# Patient Record
Sex: MALE | Race: WHITE | Employment: FULL TIME | URBAN - METROPOLITAN AREA
[De-identification: names, ages, dates, MRNs, and addresses within clinical notes are randomized per-mention and may not be internally consistent; named-entity substitution may affect disease eponyms.]

---

## 2019-08-21 ENCOUNTER — TRANSFERRED RECORDS (OUTPATIENT)
Dept: HEALTH INFORMATION MANAGEMENT | Facility: CLINIC | Age: 19
End: 2019-08-21

## 2019-08-27 ENCOUNTER — TRANSFERRED RECORDS (OUTPATIENT)
Dept: HEALTH INFORMATION MANAGEMENT | Facility: CLINIC | Age: 19
End: 2019-08-27

## 2019-08-30 ENCOUNTER — APPOINTMENT (OUTPATIENT)
Dept: CARDIOLOGY | Facility: CLINIC | Age: 19
DRG: 847 | End: 2019-08-30
Attending: PEDIATRICS
Payer: COMMERCIAL

## 2019-08-30 ENCOUNTER — HOSPITAL ENCOUNTER (INPATIENT)
Facility: CLINIC | Age: 19
LOS: 10 days | Discharge: HOME HEALTH | DRG: 847 | End: 2019-09-09
Admitting: PEDIATRICS
Payer: COMMERCIAL

## 2019-08-30 ENCOUNTER — APPOINTMENT (OUTPATIENT)
Dept: GENERAL RADIOLOGY | Facility: CLINIC | Age: 19
DRG: 847 | End: 2019-08-30
Payer: COMMERCIAL

## 2019-08-30 ENCOUNTER — ANESTHESIA EVENT (OUTPATIENT)
Dept: SURGERY | Facility: CLINIC | Age: 19
DRG: 847 | End: 2019-08-30
Payer: COMMERCIAL

## 2019-08-30 DIAGNOSIS — C83.50 T LYMPHOBLASTIC LYMPHOMA (H): ICD-10-CM

## 2019-08-30 DIAGNOSIS — C95.00 ACUTE LEUKEMIA NOT HAVING ACHIEVED REMISSION (H): Primary | ICD-10-CM

## 2019-08-30 DIAGNOSIS — C85.90 LYMPHOMA, UNSPECIFIED BODY REGION, UNSPECIFIED LYMPHOMA TYPE (H): ICD-10-CM

## 2019-08-30 LAB
ABO + RH BLD: NORMAL
ABO + RH BLD: NORMAL
ALBUMIN SERPL-MCNC: 3.1 G/DL (ref 3.4–5)
ALP SERPL-CCNC: 92 U/L (ref 40–150)
ALT SERPL W P-5'-P-CCNC: 18 U/L (ref 0–70)
ANION GAP SERPL CALCULATED.3IONS-SCNC: 8 MMOL/L (ref 3–14)
APTT PPP: 33 SEC (ref 22–37)
AST SERPL W P-5'-P-CCNC: 21 U/L (ref 0–45)
BASOPHILS # BLD AUTO: 0 10E9/L (ref 0–0.2)
BASOPHILS NFR BLD AUTO: 0.6 %
BILIRUB SERPL-MCNC: 0.5 MG/DL (ref 0.2–1.3)
BLD GP AB SCN SERPL QL: NORMAL
BLOOD BANK CMNT PATIENT-IMP: NORMAL
BUN SERPL-MCNC: 6 MG/DL (ref 7–30)
CALCIUM SERPL-MCNC: 8.4 MG/DL (ref 8.5–10.1)
CHLORIDE SERPL-SCNC: 105 MMOL/L (ref 94–109)
CO2 SERPL-SCNC: 27 MMOL/L (ref 20–32)
CREAT SERPL-MCNC: 0.6 MG/DL (ref 0.66–1.25)
D DIMER PPP FEU-MCNC: 2.2 UG/ML FEU (ref 0–0.5)
DIFFERENTIAL METHOD BLD: NORMAL
EOSINOPHIL # BLD AUTO: 0.1 10E9/L (ref 0–0.7)
EOSINOPHIL NFR BLD AUTO: 1.5 %
ERYTHROCYTE [DISTWIDTH] IN BLOOD BY AUTOMATED COUNT: 12.1 % (ref 10–15)
FIBRINOGEN PPP-MCNC: 458 MG/DL (ref 200–420)
GFR SERPL CREATININE-BSD FRML MDRD: >90 ML/MIN/{1.73_M2}
GLUCOSE SERPL-MCNC: 95 MG/DL (ref 70–99)
HCT VFR BLD AUTO: 40.5 % (ref 40–53)
HGB BLD-MCNC: 13.4 G/DL (ref 13.3–17.7)
IMM GRANULOCYTES # BLD: 0 10E9/L (ref 0–0.4)
IMM GRANULOCYTES NFR BLD: 0.2 %
INR PPP: 1.17 (ref 0.86–1.14)
LDH SERPL L TO P-CCNC: 347 U/L (ref 85–227)
LYMPHOCYTES # BLD AUTO: 0.9 10E9/L (ref 0.8–5.3)
LYMPHOCYTES NFR BLD AUTO: 17.9 %
MAGNESIUM SERPL-MCNC: 2.3 MG/DL (ref 1.6–2.3)
MCH RBC QN AUTO: 27 PG (ref 26.5–33)
MCHC RBC AUTO-ENTMCNC: 33.1 G/DL (ref 31.5–36.5)
MCV RBC AUTO: 82 FL (ref 78–100)
MONOCYTES # BLD AUTO: 0.6 10E9/L (ref 0–1.3)
MONOCYTES NFR BLD AUTO: 11.1 %
NEUTROPHILS # BLD AUTO: 3.6 10E9/L (ref 1.6–8.3)
NEUTROPHILS NFR BLD AUTO: 68.7 %
NRBC # BLD AUTO: 0 10*3/UL
NRBC BLD AUTO-RTO: 0 /100
PHOSPHATE SERPL-MCNC: 3.8 MG/DL (ref 2.5–4.5)
PLATELET # BLD AUTO: 430 10E9/L (ref 150–450)
POTASSIUM SERPL-SCNC: 4 MMOL/L (ref 3.4–5.3)
PROT SERPL-MCNC: 6.4 G/DL (ref 6.8–8.8)
RBC # BLD AUTO: 4.96 10E12/L (ref 4.4–5.9)
RETICS # AUTO: 60.5 10E9/L (ref 25–95)
RETICS/RBC NFR AUTO: 1.2 % (ref 0.5–2)
SODIUM SERPL-SCNC: 140 MMOL/L (ref 133–144)
SPECIMEN EXP DATE BLD: NORMAL
URATE SERPL-MCNC: 4.6 MG/DL (ref 3.5–7.2)
WBC # BLD AUTO: 5.2 10E9/L (ref 4–11)

## 2019-08-30 PROCEDURE — 25800030 ZZH RX IP 258 OP 636

## 2019-08-30 PROCEDURE — 85610 PROTHROMBIN TIME: CPT

## 2019-08-30 PROCEDURE — 85384 FIBRINOGEN ACTIVITY: CPT

## 2019-08-30 PROCEDURE — 99285 EMERGENCY DEPT VISIT HI MDM: CPT | Mod: Z6

## 2019-08-30 PROCEDURE — 80053 COMPREHEN METABOLIC PANEL: CPT

## 2019-08-30 PROCEDURE — 86850 RBC ANTIBODY SCREEN: CPT

## 2019-08-30 PROCEDURE — 83615 LACTATE (LD) (LDH) ENZYME: CPT

## 2019-08-30 PROCEDURE — 82955 ASSAY OF G6PD ENZYME: CPT

## 2019-08-30 PROCEDURE — 99285 EMERGENCY DEPT VISIT HI MDM: CPT | Mod: 25

## 2019-08-30 PROCEDURE — 86901 BLOOD TYPING SEROLOGIC RH(D): CPT

## 2019-08-30 PROCEDURE — 86900 BLOOD TYPING SEROLOGIC ABO: CPT

## 2019-08-30 PROCEDURE — 71046 X-RAY EXAM CHEST 2 VIEWS: CPT

## 2019-08-30 PROCEDURE — 12000014 ZZH R&B PEDS UMMC

## 2019-08-30 PROCEDURE — 83735 ASSAY OF MAGNESIUM: CPT

## 2019-08-30 PROCEDURE — 85025 COMPLETE CBC W/AUTO DIFF WBC: CPT

## 2019-08-30 PROCEDURE — 93306 TTE W/DOPPLER COMPLETE: CPT

## 2019-08-30 PROCEDURE — 25800030 ZZH RX IP 258 OP 636: Performed by: STUDENT IN AN ORGANIZED HEALTH CARE EDUCATION/TRAINING PROGRAM

## 2019-08-30 PROCEDURE — 25000132 ZZH RX MED GY IP 250 OP 250 PS 637: Performed by: STUDENT IN AN ORGANIZED HEALTH CARE EDUCATION/TRAINING PROGRAM

## 2019-08-30 PROCEDURE — 85379 FIBRIN DEGRADATION QUANT: CPT

## 2019-08-30 PROCEDURE — 84550 ASSAY OF BLOOD/URIC ACID: CPT

## 2019-08-30 PROCEDURE — 85730 THROMBOPLASTIN TIME PARTIAL: CPT

## 2019-08-30 PROCEDURE — 84100 ASSAY OF PHOSPHORUS: CPT

## 2019-08-30 PROCEDURE — 85045 AUTOMATED RETICULOCYTE COUNT: CPT

## 2019-08-30 RX ADMIN — DEXTROSE AND SODIUM CHLORIDE 200 ML/HR: 5; 900 INJECTION, SOLUTION INTRAVENOUS at 15:56

## 2019-08-30 RX ADMIN — Medication 150 MG: at 19:25

## 2019-08-30 RX ADMIN — DEXTROSE AND SODIUM CHLORIDE: 5; 900 INJECTION, SOLUTION INTRAVENOUS at 21:24

## 2019-08-30 ASSESSMENT — MIFFLIN-ST. JEOR: SCORE: 1792.62

## 2019-08-30 NOTE — ED NOTES
ED PEDS HANDOFF      PATIENT NAME: Lazaro Lund   MRN: [id number]   YOB: 2000   AGE: 19 year old       S (Situation)     ED Chief Complaint: Abnormal Labs     ED Final Diagnosis: Final diagnoses:   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)      Isolation Precautions: None   Suspected Infection: Not Applicable     Needed?: No     B (Background)    Pertinent Past Medical History: History reviewed. No pertinent past medical history.   Allergies: Allergies   Allergen Reactions     No Known Drug Allergies         A (Assessment)    Vital Signs: Vitals:    08/30/19 1523   BP: 107/76   Resp: 12   Temp: 99  F (37.2  C)   TempSrc: Oral   SpO2: 99%   Weight: 75.5 kg (166 lb 7.2 oz)       Current Pain Level: 0-10 Pain Scale: 0   Medication Administration: ED Medication Administration from 08/30/2019 1520 to 08/30/2019 1651     Date/Time Order Dose Route Action Action by    08/30/2019 1556 dextrose 5% and 0.9% NaCl infusion 200 mL/hr Intravenous New Liliana Alarcon RN         Interventions:        PIV:  20 LT AC       Drains:         Oxygen Needs:              Respiratory Settings: O2 Device: None (Room air)   Falls risk: No   Skin Integrity: Intact   Tasks Pending: Signed and Held Orders     None               R (Recommendations)    Family Present:  Yes   Other Considerations:      Questions Please Call: Treatment Team: Attending Provider: Hiren Mitchell MD; MD: Peds [facility], [facility]   Ready for Conference Call:   No

## 2019-08-30 NOTE — ED PROVIDER NOTES
History     Chief Complaint   Patient presents with     Abnormal Labs     HPI    History obtained from patient    Lazaro is a 19 year old young man who presents at 1535 with a recent history of chronic cough, that led to an evaluation for leukemia or lymphoma in an outside healthcare system over the last 3 weeks. He developed a chronic cough 3 weeks ago, was seen in an outpatient clinic, and diagnosed and treated for bronchitis. He did not improve. About 2.5 weeks ago, he returned and had a CXR that showed a mass and pleural fluid, leading to a pleural tap. The results of that fluid led to a chest CT scan, and CT-guided biopsy of his mass last week. 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    Over the last few weeks, he has noticed gradual enlargement of his lymph nodes with some soreness, his ongoing cough, and occasional difficulty swallowing. He has been sleeping poorly due to cough. He has no recent fever, vomiting, diarrhea, sore throat, runny nose, or other illness or injury concerns.      PMHx:  No past medical history on file.  No past surgical history on file.  These were reviewed with the patient/family.    MEDICATIONS were reviewed and are as follows:   No current facility-administered medications for this encounter.      Current Outpatient Medications   Medication     NO ACTIVE MEDICATIONS       ALLERGIES:  No known drug allergies    IMMUNIZATIONS:  UTD by report.    SOCIAL HISTORY: Lazaro lives with his father, his mother lives separately and just found out this history today.  He does not attend school.      I have reviewed the Medications, Allergies, Past Medical and Surgical History, and Social History in the Epic system.    Review of Systems  Please see HPI for pertinent positives and negatives.  All other systems reviewed and found to be negative.        Physical  Exam   BP: 107/76  Heart Rate: 99  Temp: 99  F (37.2  C)  Resp: 12  Weight: 75.5 kg (166 lb 7.2 oz)  SpO2: 99 %      Physical Exam  Appearance: Alert and appropriate, well developed, nontoxic, with moist mucous membranes.  HEENT: Head: Normocephalic and atraumatic. Eyes: PERRL, EOM grossly intact, conjunctivae and sclerae clear. Ears: Tympanic membranes clear bilaterally, without inflammation or effusion. Nose: Nares clear with no active discharge.  Mouth/Throat: No oral lesions, pharynx clear with no erythema or exudate.  Neck: Supple, left side mildly tender masses, with significant cervical lymphadenopathy.  Pulmonary: No grunting, flaring, retractions or stridor. Good air entry, clear to auscultation bilaterally, with no rales, rhonchi, or wheezing.  Cardiovascular: Regular rate and rhythm, normal S1 and S2, with no murmurs.  Normal symmetric peripheral pulses and brisk cap refill.  Abdominal: Normal bowel sounds, soft, nontender, nondistended, with no masses and no hepatosplenomegaly.  Neurologic: Alert and oriented, cranial nerves II-XII grossly intact, moving all extremities equally with grossly normal coordination and normal gait.  Extremities/Back: No deformity, no CVA tenderness.  Skin: No significant rashes, ecchymoses, or lacerations.  Genitourinary: Deferred  Rectal:  Deferred    ED Course      Procedures    Results for orders placed or performed during the hospital encounter of 08/30/19 (from the past 24 hour(s))   CBC with platelets differential   Result Value Ref Range    WBC 5.2 4.0 - 11.0 10e9/L    RBC Count 4.96 4.4 - 5.9 10e12/L    Hemoglobin 13.4 13.3 - 17.7 g/dL    Hematocrit 40.5 40.0 - 53.0 %    MCV 82 78 - 100 fl    MCH 27.0 26.5 - 33.0 pg    MCHC 33.1 31.5 - 36.5 g/dL    RDW 12.1 10.0 - 15.0 %    Platelet Count 430 150 - 450 10e9/L    Diff Method Automated Method     % Neutrophils 68.7 %    % Lymphocytes 17.9 %    % Monocytes 11.1 %    % Eosinophils 1.5 %    % Basophils 0.6 %    % Immature  Granulocytes 0.2 %    Nucleated RBCs 0 0 /100    Absolute Neutrophil 3.6 1.6 - 8.3 10e9/L    Absolute Lymphocytes 0.9 0.8 - 5.3 10e9/L    Absolute Monocytes 0.6 0.0 - 1.3 10e9/L    Absolute Eosinophils 0.1 0.0 - 0.7 10e9/L    Absolute Basophils 0.0 0.0 - 0.2 10e9/L    Abs Immature Granulocytes 0.0 0 - 0.4 10e9/L    Absolute Nucleated RBC 0.0    Comprehensive metabolic panel   Result Value Ref Range    Sodium 140 133 - 144 mmol/L    Potassium 4.0 3.4 - 5.3 mmol/L    Chloride 105 94 - 109 mmol/L    Carbon Dioxide 27 20 - 32 mmol/L    Anion Gap 8 3 - 14 mmol/L    Glucose 95 70 - 99 mg/dL    Urea Nitrogen 6 (L) 7 - 30 mg/dL    Creatinine 0.60 (L) 0.66 - 1.25 mg/dL    GFR Estimate >90 >60 mL/min/[1.73_m2]    GFR Estimate If Black >90 >60 mL/min/[1.73_m2]    Calcium 8.4 (L) 8.5 - 10.1 mg/dL    Bilirubin Total 0.5 0.2 - 1.3 mg/dL    Albumin 3.1 (L) 3.4 - 5.0 g/dL    Protein Total 6.4 (L) 6.8 - 8.8 g/dL    Alkaline Phosphatase 92 40 - 150 U/L    ALT 18 0 - 70 U/L    AST 21 0 - 45 U/L   Lactate Dehydrogenase   Result Value Ref Range    Lactate Dehydrogenase 347 (H) 85 - 227 U/L   Uric acid   Result Value Ref Range    Uric Acid 4.6 3.5 - 7.2 mg/dL   PTT   Result Value Ref Range    PTT 33 22 - 37 sec   INR   Result Value Ref Range    INR 1.17 (H) 0.86 - 1.14   Reticulocyte Count   Result Value Ref Range    % Retic 1.2 0.5 - 2.0 %    Absolute Retic 60.5 25 - 95 10e9/L   D dimer quantitative   Result Value Ref Range    D Dimer 2.2 (H) 0.0 - 0.50 ug/ml FEU   Fibrinogen activity   Result Value Ref Range    Fibrinogen 458 (H) 200 - 420 mg/dL   Magnesium   Result Value Ref Range    Magnesium 2.3 1.6 - 2.3 mg/dL   Phosphorus   Result Value Ref Range    Phosphorus 3.8 2.5 - 4.5 mg/dL   ABO/Rh type and screen   Result Value Ref Range    ABO PENDING     Antibody Screen PENDING     Test Valid Only At          [facility]    Specimen Expires 09/02/2019    XR Chest 2 Views    Narrative    XR  CHEST 2 VW  8/30/2019 4:23 PM      HISTORY: New diagnosis of ALL    COMPARISON: None    FINDINGS:   PA and lateral views of the chest. The cardiac silhouette size is  normal. There is a superior mediastinal mass. Question a few small  calcifications within the mass. There is a small left pleural  effusion. There are adjacent hazy pulmonary opacities in the left  lower lobe. The lungs are otherwise clear. The visualized upper  abdomen and bones are normal.      Impression    IMPRESSION:   1. Superior mediastinal mass.  2. Small left pleural effusion with adjacent left basilar opacities,  favoring atelectasis. Infection would be difficult to exclude.    [Result: Mediastinal mass]    Finding was identified on 8/30/2019 4:25 PM.     Dr. Mitchell was contacted by Dr. Pink at 8/30/2019 4:28 PM and  verbalized understanding of the finding.     FATOUMATA PINK MD       Medications - No data to display    Old chart from [facility] reviewed, nothing in our system.  Patient was attended to immediately upon arrival and assessed for immediate life-threatening conditions.  History obtained from family.   utilized    3:58 PM  Discussed with Pediatric Hematology/Oncology.    4:33 PM  Pediatric Heme/Onc in the ED evaluating the patient, planning PICU admission.  Discussed CXR with Pediatric Radiologist, demonstrates large mediastinal mass, left pleural effusion.    Assessments & Plan (with Medical Decision Making)   Lazaro presents to the [facility] ED for evaluation prior to hospitalization for newly diagnosed leukemia, after an extensive work-up in the [facility]. In the ED today, he is clinically stable, with evidence of a L pleural effusion on physical examination and CXR, and evidence of significant cervical lymphadenopathy on exam. He also had a history of dysphagia, likely secondary to his mediastinal mass and associated lymphadenopathy.    His laboratory evaluation is relatively normal, with a normal  CBC, moderately elevated LDH, normal uric acid, with additional studies pending.    Plan hospital admission to the Pediatric Hematology/Oncology service for further evaluation, and the initiation of his therapy for newly diagnosed, but as yet relatively undefined ALL.    I have reviewed the nursing notes.    I have reviewed the findings, diagnosis, plan and need for follow up with the patient.  New Prescriptions    No medications on file       Final diagnoses:   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)       8/30/2019   [facility] EMERGENCY DEPARTMENT     Hiren Mitchell MD  08/30/19 9413

## 2019-08-30 NOTE — ED TRIAGE NOTES
Pt referred to ED for abnormal labs and new cancer dx. Currently experiencing cough and swollen lymph nodes, no other complaints.

## 2019-08-30 NOTE — H&P
[facility], [facility]    History and Physical - Blue team, Heme/Onc Service        Date of Admission:  8/30/2019    Assessment & Plan   Lazaro Lund is a 19 year old male admitted on 8/30/2019. He presents with 3 weeks of cough with evaluation eventually leading to diagnosis of likely lymphoma with mediastinal mass. He requires hospitalization for continued work up and treatment plan development of his likely lymphoma. He is currently vitally stable, although requires monitoring for concerns of mass effect in his chest including cardiac tamponade and respiratory distress.     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   - Do NOT lay flat, head of bed > 60 degrees at all times  - pending bone marrow biopsy  - pending PICC placement  - pending left pleural effusion drainage  - pending LP    Cardiac tamponade physiology -  On screening echo found to have cardiac tamponade physiology with small to moderate pericardial effusion. Pleural effusion likely also contributing to tamponade physiology.  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  Entered: Cheo Blanton MD 08/30/2019, 6:42 PM       This patient was seen and discussed with attending physician, Dr. John Blanton MD, PhD  Pediatric  Resident  [facility]  Pager [phone]    August 30, 2019 6:43 PM    _____________________________________________________________________    Chief Complaint   Chronic cough    History is obtained from the patient, electronic health record, emergency department physician, patient's father and patient's mother    History of Present Illness   Lazaro Lund is a 19 year old male who presents after work-up for his 3 weeks of cough with diagnosis of likely lymphoma based on initial outside studies.  About 3 weeks ago, he went in to clinic for evaluation of his cough that have been bothering him for 2 to 3 months.  He was diagnosed with bronchitis and given a course of antibiotics.  He then returned to care (8/20/2019) without improvement and got a chest x-ray that showed a mediastinal mass and pleural fluid.  He then had the pleural fluid removed and was sent for analysis (8/21/2019).  That led to a chest CT scan and a CT-guided biopsy of the mass last week (8/27/2019).  Today he was coded with results and instructed to come to the [facility] for further treatment.  He says when he had the fluid removed he did feel short of breath prior to that and they removed about 1.2 L of fluid 5 days ago.  Since then he states he has noticed some return of the previous symptoms but not yet as severe as when he needed the fluid removed.  Over the past few weeks, he has also noticed enlargement of his lymph nodes especially on his left submandibular and left axillary areas.  He denies any fever, nausea, vomiting, diarrhea or other concerns.    ED Course:   In the ED, he received another chest x-ray which did indicate the left pleural effusion and mediastinal mass.  He got a laboratory evaluation with a CBC, LDH, uric acid, and other pending studies.  He was vitally stable and deemed appropriate for admission to the floor.     Review of Systems    The 10 point Review of Systems is negative other than  noted in the HPI or here.    Past Medical History    I have reviewed this patient's medical history and updated it with pertinent information if needed.   Past Medical History:   Diagnosis Date     Migraine 2006    have resolved        Past Surgical History   I have reviewed this patient's surgical history and updated it with pertinent information if needed.  History reviewed. No pertinent surgical history.     Social History   I have reviewed this patient's social history and updated it with pertinent information if needed. Lazaro Lund  reports that he has never smoked. He has never used smokeless tobacco. He reports that he does not drink alcohol or use drugs.   He currently lives at home with dad and step-mom. 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     Family History   I have reviewed this patient's family history and updated it with pertinent information if needed.   Family History   Problem Relation Age of Onset     No Known Problems Mother      No Known Problems Father      Asthma Brother      Thyroid Cancer Paternal Grandmother      Melanoma Paternal Aunt        Prior to Admission Medications   Prior to Admission Medications   Prescriptions Last Dose Informant Patient Reported? Taking?   NO ACTIVE MEDICATIONS   Yes No   Sig: .      Facility-Administered Medications: None     Allergies   Allergies   Allergen Reactions     No Known Drug Allergies        Physical Exam   Vital Signs: Temp: 99.1  F (37.3  C) Temp src: Oral BP: 106/64   Heart Rate: 102 Resp: 18 SpO2: 97 % O2 Device: None (Room air)    Weight: 165 lbs 12.57 oz    Appearance: Alert and appropriate, well developed, nontoxic, with moist mucous membranes.  HEENT: Head: Normocephalic and atraumatic. Eyes: PERRL, EOM grossly intact, conjunctivae and sclerae clear. Nose: Nares clear with no active discharge.  Mouth/Throat: No oral lesions, pharynx clear with no erythema or exudate.  Neck: Supple, left side mildly tender masses, with  significant cervical lymphadenopathy  Pulmonary: No grunting, flaring, retractions or stridor. Good air entry, clear to auscultation on right, no wheezing, rales in left base with inspiration and expiration.   Cardiovascular: Regular rate and rhythm, normal S1 and S2, with no murmurs.  Normal symmetric peripheral pulses and brisk cap refill.  Abdominal: Normal bowel sounds, soft, nontender, nondistended, with no masses and no hepatosplenomegaly.  Neurologic: Alert and oriented, cranial nerves II-XII grossly intact, moving all extremities equally with grossly normal coordination and normal gait.  Extremities/Back: No deformity, no CVA tenderness.  Skin: No significant rashes, ecchymoses, or lacerations.    Data   Data reviewed today: I reviewed all medications, new labs and imaging results over the last 24 hours.    Recent Labs   Lab 08/30/19  1557   WBC 5.2   HGB 13.4   MCV 82      INR 1.17*      POTASSIUM 4.0   CHLORIDE 105   CO2 27   BUN 6*   CR 0.60*   ANIONGAP 8   MICHAEL 8.4*   GLC 95   ALBUMIN 3.1*   PROTTOTAL 6.4*   BILITOTAL 0.5   ALKPHOS 92   ALT 18   AST 21     Recent Results (from the past 24 hour(s))   XR Chest 2 Views    Narrative    XR CHEST 2 VW  8/30/2019 4:23 PM      HISTORY: New diagnosis of ALL    COMPARISON: None    FINDINGS:   PA and lateral views of the chest. The cardiac silhouette size is  normal. There is a superior mediastinal mass. Question a few small  calcifications within the mass. There is a small left pleural  effusion. There are adjacent hazy pulmonary opacities in the left  lower lobe. The lungs are otherwise clear. The visualized upper  abdomen and bones are normal.      Impression    IMPRESSION:   1. Superior mediastinal mass.  2. Small left pleural effusion with adjacent left basilar opacities,  favoring atelectasis. Infection would be difficult to exclude.    [Result: Mediastinal mass]    Finding was identified on 8/30/2019 4:25 PM.     Dr. Mitchell was contacted by   Sarita at 8/30/2019 4:28 PM and  verbalized understanding of the finding.     FATOUMATA PINK MD        [facility]  08/21: left pleural fluid sample  Final Diagnosis:cytology - T-cell lymphoblastic lymphoma  Microscopic Description: Slides show a cellular specimen consisting of medium to large sized irregular cells with moderately dispersed chromatin, variably prominent nucleoli, nuclear irregularities and a scant amount of basophilic cytoplasm.  In the background, there are small mature lymphocytes, macrophages and a few benign mesothelial cells.  Ancillary Studies: Flow cytometric immunophenotyping studies are performed at [facility] and interpreted by Dr. Parson.  There is an abnormal T-cell population comprising approximately 55% of the total viable nucleated events is defined by light scatter criteria with the following flow symmetric characteristics:    Brightly positive markers:  Moderately positive markers: CD1a/CD2/CD7/CD10/TCRgd/CD3 (cyto)/TdT (n)  Dimly positive markers: CD5  Partially/variably positive markers: CD3/CD4/CD34  Negative markers: CD8/CD16/CD56/CD57/CD19/TCRab/CD25/HLA-DR/CD79b/CD79a (cyto)/MPO (cyto)  Number of markers performed: 21.    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  Where intact, viable cells have intermediate-sized nuclei with dispersed chromatin and variably prominent nucleoli.  Mitotic activity is difficult to discern.  To confirm T8-cell lymphoblastic lymphoma, the following immunoperoxidase studies are performed with appropriate positive and negative controls.  Neoplastic cells are positive for the T-cell marker CD3 and  TdT.  Focal B-cell aggregates (CD 20) are present adjacent to the tumor.  A cytokeratin cocktail stain is negative for epithelial cells, excluding thymoma.    I personally saw and examined the patient with the resident as above.  I personally reviewed the laboratory and imaging results as above. I agree with the assessment and plan as above.  Heather Lopez, MSc., MD

## 2019-08-30 NOTE — LETTER
Treatment Overview    Patient Name: Lazaro Lund    YOB: 2000    Diagnosis: T Cell Lymphoma    Stage:      Treatment will likely include:     Yes No TBD          [x]   []    []    Chemotherapy (medicines to kill cancer cells)                       []   [x]    []    Immunotherapy (medicines to boost the body s ability to fight cancer)                       []   [x]    []    Radiation therapy (high energy x-rays to kill cancer cells)                   []   [x]    []    Surgery (operation to remove tumors)         []   [x]    []    Other:     Treatment schedule:      Treatment will start with: Chemotherapy  Treatment will last: ~ 2 1/1 to 3 years    Treatment location:             [x]   Treatments will happen in the hospital (spend the night)       [x]   Treatments will happen in the clinic (come during the day and go home same day)     My Health Care Team:      Doctor:  Reynaldo Campuzano MD    Nurse Practitioner: Luana Van NP     Fellow: Nicho Fischer     Nurse Coordinator: Karen Joiner RN (inpatient) [phone]    :  Zena Pizano  [phone]    How to contact the health team:     There is a Pediatric Oncologist on call 24 hours a day. If you need to reach someone after hours or urgently call the page  at [phone] and ask to speak to the Pediatric Oncology Fellow On Call.     Hospital Toll Free Number:                                [phone]  [facility] (9th floor):                                    [phone]  Clinic Nurse Triage Line:                                    [phone]  [address] [place] [address]:         [phone]

## 2019-08-31 ENCOUNTER — APPOINTMENT (OUTPATIENT)
Dept: INTERVENTIONAL RADIOLOGY/VASCULAR | Facility: CLINIC | Age: 19
DRG: 847 | End: 2019-08-31
Attending: RADIOLOGY
Payer: COMMERCIAL

## 2019-08-31 ENCOUNTER — APPOINTMENT (OUTPATIENT)
Dept: GENERAL RADIOLOGY | Facility: CLINIC | Age: 19
DRG: 847 | End: 2019-08-31
Attending: PEDIATRICS
Payer: COMMERCIAL

## 2019-08-31 ENCOUNTER — ANESTHESIA (OUTPATIENT)
Dept: SURGERY | Facility: CLINIC | Age: 19
DRG: 847 | End: 2019-08-31
Payer: COMMERCIAL

## 2019-08-31 LAB
ANION GAP SERPL CALCULATED.3IONS-SCNC: 9 MMOL/L (ref 3–14)
BUN SERPL-MCNC: 3 MG/DL (ref 7–30)
CALCIUM SERPL-MCNC: 7.7 MG/DL (ref 8.5–10.1)
CHLORIDE SERPL-SCNC: 107 MMOL/L (ref 94–109)
CO2 SERPL-SCNC: 25 MMOL/L (ref 20–32)
CREAT SERPL-MCNC: 0.58 MG/DL (ref 0.66–1.25)
GFR SERPL CREATININE-BSD FRML MDRD: >90 ML/MIN/{1.73_M2}
GLUCOSE BLDC GLUCOMTR-MCNC: 80 MG/DL (ref 70–99)
GLUCOSE CSF-MCNC: 60 MG/DL (ref 40–70)
GLUCOSE SERPL-MCNC: 111 MG/DL (ref 70–99)
MAGNESIUM SERPL-MCNC: 1.8 MG/DL (ref 1.6–2.3)
PHOSPHATE SERPL-MCNC: 3.8 MG/DL (ref 2.5–4.5)
POTASSIUM SERPL-SCNC: 3.3 MMOL/L (ref 3.4–5.3)
PROT CSF-MCNC: 20 MG/DL (ref 15–60)
SODIUM SERPL-SCNC: 141 MMOL/L (ref 133–144)
URATE SERPL-MCNC: 3 MG/DL (ref 3.5–7.2)

## 2019-08-31 PROCEDURE — 25800030 ZZH RX IP 258 OP 636: Performed by: STUDENT IN AN ORGANIZED HEALTH CARE EDUCATION/TRAINING PROGRAM

## 2019-08-31 PROCEDURE — 87641 MR-STAPH DNA AMP PROBE: CPT | Performed by: PEDIATRICS

## 2019-08-31 PROCEDURE — 88185 FLOWCYTOMETRY/TC ADD-ON: CPT | Performed by: PEDIATRICS

## 2019-08-31 PROCEDURE — 25000128 H RX IP 250 OP 636: Performed by: PEDIATRICS

## 2019-08-31 PROCEDURE — 0W9B30Z DRAINAGE OF LEFT PLEURAL CAVITY WITH DRAINAGE DEVICE, PERCUTANEOUS APPROACH: ICD-10-PCS | Performed by: RADIOLOGY

## 2019-08-31 PROCEDURE — 25000125 ZZHC RX 250: Performed by: RADIOLOGY

## 2019-08-31 PROCEDURE — 27211024 ZZHC OR SUPPLY OTHER OPNP: Performed by: PEDIATRICS

## 2019-08-31 PROCEDURE — 25000132 ZZH RX MED GY IP 250 OP 250 PS 637: Performed by: STUDENT IN AN ORGANIZED HEALTH CARE EDUCATION/TRAINING PROGRAM

## 2019-08-31 PROCEDURE — 88184 FLOWCYTOMETRY/ TC 1 MARKER: CPT | Performed by: PEDIATRICS

## 2019-08-31 PROCEDURE — 27210794 ZZH OR GENERAL SUPPLY STERILE: Performed by: PEDIATRICS

## 2019-08-31 PROCEDURE — 37000009 ZZH ANESTHESIA TECHNICAL FEE, EACH ADDTL 15 MIN: Performed by: PEDIATRICS

## 2019-08-31 PROCEDURE — 88280 CHROMOSOME KARYOTYPE STUDY: CPT | Performed by: STUDENT IN AN ORGANIZED HEALTH CARE EDUCATION/TRAINING PROGRAM

## 2019-08-31 PROCEDURE — 20300000 ZZH R&B PICU UMMC

## 2019-08-31 PROCEDURE — 83735 ASSAY OF MAGNESIUM: CPT | Performed by: STUDENT IN AN ORGANIZED HEALTH CARE EDUCATION/TRAINING PROGRAM

## 2019-08-31 PROCEDURE — 40000986 XR CHEST PORT 1 VW

## 2019-08-31 PROCEDURE — 87640 STAPH A DNA AMP PROBE: CPT | Performed by: PEDIATRICS

## 2019-08-31 PROCEDURE — C1769 GUIDE WIRE: HCPCS | Performed by: PEDIATRICS

## 2019-08-31 PROCEDURE — 80048 BASIC METABOLIC PNL TOTAL CA: CPT | Performed by: STUDENT IN AN ORGANIZED HEALTH CARE EDUCATION/TRAINING PROGRAM

## 2019-08-31 PROCEDURE — C1894 INTRO/SHEATH, NON-LASER: HCPCS | Performed by: PEDIATRICS

## 2019-08-31 PROCEDURE — 88108 CYTOPATH CONCENTRATE TECH: CPT | Mod: 26 | Performed by: PEDIATRICS

## 2019-08-31 PROCEDURE — 25000128 H RX IP 250 OP 636: Performed by: NURSE ANESTHETIST, CERTIFIED REGISTERED

## 2019-08-31 PROCEDURE — C1751 CATH, INF, PER/CENT/MIDLINE: HCPCS | Performed by: PEDIATRICS

## 2019-08-31 PROCEDURE — 88108 CYTOPATH CONCENTRATE TECH: CPT | Performed by: PEDIATRICS

## 2019-08-31 PROCEDURE — 40000003 IR CHEST TUBE PLACEMENT NON-TUNNELLED LEFT

## 2019-08-31 PROCEDURE — 009U3ZX DRAINAGE OF SPINAL CANAL, PERCUTANEOUS APPROACH, DIAGNOSTIC: ICD-10-PCS | Performed by: PEDIATRICS

## 2019-08-31 PROCEDURE — 02HV33Z INSERTION OF INFUSION DEVICE INTO SUPERIOR VENA CAVA, PERCUTANEOUS APPROACH: ICD-10-PCS | Performed by: RADIOLOGY

## 2019-08-31 PROCEDURE — 82945 GLUCOSE OTHER FLUID: CPT | Performed by: PEDIATRICS

## 2019-08-31 PROCEDURE — 40001005 ZZHCL STATISTIC FLOW >15 ABY TC 88189: Performed by: PEDIATRICS

## 2019-08-31 PROCEDURE — 88237 TISSUE CULTURE BONE MARROW: CPT | Performed by: STUDENT IN AN ORGANIZED HEALTH CARE EDUCATION/TRAINING PROGRAM

## 2019-08-31 PROCEDURE — 89051 BODY FLUID CELL COUNT: CPT | Performed by: PEDIATRICS

## 2019-08-31 PROCEDURE — 84157 ASSAY OF PROTEIN OTHER: CPT | Performed by: PEDIATRICS

## 2019-08-31 PROCEDURE — 25000125 ZZHC RX 250: Performed by: PEDIATRICS

## 2019-08-31 PROCEDURE — 36000059 ZZH SURGERY LEVEL 3 EA 15 ADDTL MIN UMMC: Performed by: PEDIATRICS

## 2019-08-31 PROCEDURE — 25000128 H RX IP 250 OP 636: Performed by: RADIOLOGY

## 2019-08-31 PROCEDURE — 25000128 H RX IP 250 OP 636: Performed by: STUDENT IN AN ORGANIZED HEALTH CARE EDUCATION/TRAINING PROGRAM

## 2019-08-31 PROCEDURE — 3E0R305 INTRODUCTION OF OTHER ANTINEOPLASTIC INTO SPINAL CANAL, PERCUTANEOUS APPROACH: ICD-10-PCS | Performed by: PEDIATRICS

## 2019-08-31 PROCEDURE — 37000008 ZZH ANESTHESIA TECHNICAL FEE, 1ST 30 MIN: Performed by: PEDIATRICS

## 2019-08-31 PROCEDURE — 25000125 ZZHC RX 250: Performed by: NURSE ANESTHETIST, CERTIFIED REGISTERED

## 2019-08-31 PROCEDURE — 40000170 ZZH STATISTIC PRE-PROCEDURE ASSESSMENT II: Performed by: PEDIATRICS

## 2019-08-31 PROCEDURE — 84550 ASSAY OF BLOOD/URIC ACID: CPT | Performed by: STUDENT IN AN ORGANIZED HEALTH CARE EDUCATION/TRAINING PROGRAM

## 2019-08-31 PROCEDURE — 00000146 ZZHCL STATISTIC GLUCOSE BY METER IP

## 2019-08-31 PROCEDURE — 36000061 ZZH SURGERY LEVEL 3 W FLUORO 1ST 30 MIN - UMMC: Performed by: PEDIATRICS

## 2019-08-31 PROCEDURE — 89050 BODY FLUID CELL COUNT: CPT | Performed by: PEDIATRICS

## 2019-08-31 PROCEDURE — 00000102 ZZHCL STATISTIC CYTO WRIGHT STAIN TC: Performed by: PEDIATRICS

## 2019-08-31 PROCEDURE — 84100 ASSAY OF PHOSPHORUS: CPT | Performed by: STUDENT IN AN ORGANIZED HEALTH CARE EDUCATION/TRAINING PROGRAM

## 2019-08-31 PROCEDURE — 40000277 XR SURGERY CARM FLUORO LESS THAN 5 MIN W STILLS

## 2019-08-31 PROCEDURE — 40001003 ZZHCL STATISTIC FLOW INT 2-8 ABY TC 88187: Performed by: PEDIATRICS

## 2019-08-31 PROCEDURE — 88264 CHROMOSOME ANALYSIS 20-25: CPT | Performed by: STUDENT IN AN ORGANIZED HEALTH CARE EDUCATION/TRAINING PROGRAM

## 2019-08-31 DEVICE — CATH VA PICC 4FRX60CM DL PEDS VASCU-PICC SET MR81014201: Type: IMPLANTABLE DEVICE | Site: ARM | Status: FUNCTIONAL

## 2019-08-31 RX ORDER — LIDOCAINE 40 MG/G
CREAM TOPICAL
Status: COMPLETED | OUTPATIENT
Start: 2019-08-31 | End: 2019-08-31

## 2019-08-31 RX ORDER — DIPHENHYDRAMINE HCL 25 MG
25 CAPSULE ORAL EVERY 6 HOURS PRN
Status: DISCONTINUED | OUTPATIENT
Start: 2019-08-31 | End: 2019-08-31

## 2019-08-31 RX ORDER — DIPHENHYDRAMINE HYDROCHLORIDE 50 MG/ML
25 INJECTION INTRAMUSCULAR; INTRAVENOUS EVERY 6 HOURS PRN
Status: DISCONTINUED | OUTPATIENT
Start: 2019-08-31 | End: 2019-09-01

## 2019-08-31 RX ORDER — DEXAMETHASONE SODIUM PHOSPHATE 4 MG/ML
10 INJECTION, SOLUTION INTRA-ARTICULAR; INTRALESIONAL; INTRAMUSCULAR; INTRAVENOUS; SOFT TISSUE EVERY 12 HOURS
Status: DISCONTINUED | OUTPATIENT
Start: 2019-08-31 | End: 2019-08-31

## 2019-08-31 RX ORDER — SODIUM CHLORIDE 9 MG/ML
INJECTION, SOLUTION INTRAVENOUS
Status: DISCONTINUED
Start: 2019-08-31 | End: 2019-09-01 | Stop reason: HOSPADM

## 2019-08-31 RX ORDER — DEXAMETHASONE SODIUM PHOSPHATE 4 MG/ML
5 INJECTION, SOLUTION INTRA-ARTICULAR; INTRALESIONAL; INTRAMUSCULAR; INTRAVENOUS; SOFT TISSUE EVERY 12 HOURS
Status: DISCONTINUED | OUTPATIENT
Start: 2019-08-31 | End: 2019-09-01

## 2019-08-31 RX ORDER — ACETAMINOPHEN 325 MG/1
650 TABLET ORAL EVERY 6 HOURS
Status: DISCONTINUED | OUTPATIENT
Start: 2019-08-31 | End: 2019-09-09 | Stop reason: HOSPADM

## 2019-08-31 RX ORDER — HEPARIN SODIUM,PORCINE 10 UNIT/ML
2-4 VIAL (ML) INTRAVENOUS
Status: DISCONTINUED | OUTPATIENT
Start: 2019-08-31 | End: 2019-09-09 | Stop reason: HOSPADM

## 2019-08-31 RX ORDER — HEPARIN SODIUM,PORCINE 10 UNIT/ML
2-4 VIAL (ML) INTRAVENOUS EVERY 24 HOURS
Status: DISCONTINUED | OUTPATIENT
Start: 2019-08-31 | End: 2019-09-09 | Stop reason: HOSPADM

## 2019-08-31 RX ORDER — HEPARIN SODIUM,PORCINE 10 UNIT/ML
VIAL (ML) INTRAVENOUS PRN
Status: DISCONTINUED | OUTPATIENT
Start: 2019-08-31 | End: 2019-08-31 | Stop reason: HOSPADM

## 2019-08-31 RX ORDER — LIDOCAINE 40 MG/G
CREAM TOPICAL
Status: DISCONTINUED | OUTPATIENT
Start: 2019-08-31 | End: 2019-09-09 | Stop reason: HOSPADM

## 2019-08-31 RX ORDER — KETAMINE HYDROCHLORIDE 10 MG/ML
INJECTION, SOLUTION INTRAMUSCULAR; INTRAVENOUS PRN
Status: DISCONTINUED | OUTPATIENT
Start: 2019-08-31 | End: 2019-08-31

## 2019-08-31 RX ADMIN — DIPHENHYDRAMINE HYDROCHLORIDE 25 MG: 50 INJECTION, SOLUTION INTRAMUSCULAR; INTRAVENOUS at 22:36

## 2019-08-31 RX ADMIN — MIDAZOLAM 0.5 MG: 1 INJECTION INTRAMUSCULAR; INTRAVENOUS at 17:11

## 2019-08-31 RX ADMIN — FAMOTIDINE 20 MG: 20 INJECTION, SOLUTION INTRAVENOUS at 20:33

## 2019-08-31 RX ADMIN — Medication 150 MG: at 20:11

## 2019-08-31 RX ADMIN — DEXTROSE AND SODIUM CHLORIDE: 5; 900 INJECTION, SOLUTION INTRAVENOUS at 14:53

## 2019-08-31 RX ADMIN — Medication 10 MG: at 16:56

## 2019-08-31 RX ADMIN — DEXTROSE AND SODIUM CHLORIDE: 5; 900 INJECTION, SOLUTION INTRAVENOUS at 05:55

## 2019-08-31 RX ADMIN — DEXAMETHASONE SODIUM PHOSPHATE 9.76 MG: 4 INJECTION, SOLUTION INTRAMUSCULAR; INTRAVENOUS at 20:15

## 2019-08-31 RX ADMIN — Medication 150 MG: at 08:41

## 2019-08-31 RX ADMIN — Medication 10 MG: at 17:49

## 2019-08-31 RX ADMIN — DEXTROSE AND SODIUM CHLORIDE: 5; 900 INJECTION, SOLUTION INTRAVENOUS at 20:58

## 2019-08-31 RX ADMIN — ACETAMINOPHEN 650 MG: 325 TABLET, FILM COATED ORAL at 20:10

## 2019-08-31 RX ADMIN — Medication 3 MG: at 20:14

## 2019-08-31 RX ADMIN — Medication 150 MG: at 14:01

## 2019-08-31 RX ADMIN — SODIUM CHLORIDE 6 MG: 9 INJECTION, SOLUTION INTRAVENOUS at 21:43

## 2019-08-31 RX ADMIN — MIDAZOLAM 0.5 MG: 1 INJECTION INTRAMUSCULAR; INTRAVENOUS at 16:56

## 2019-08-31 RX ADMIN — LIDOCAINE: 40 CREAM TOPICAL at 15:45

## 2019-08-31 NOTE — PLAN OF CARE
Pt arrived on floor around 1800. VSS. Pt denies pain. Good appetite, voiding. Pre-op shower completed. Pt asking appropriate questions. Allopurinol started. Continue to monitor.

## 2019-08-31 NOTE — ANESTHESIA PREPROCEDURE EVALUATION
Anesthesia Pre-Procedure Evaluation    Patient: Lazaro Lund   MRN:     [id number] Gender:   male   Age:    19 year old :      2000        Preoperative Diagnosis: Lymphoma   Procedure(s):  BIOPSY, BONE MARROW  LUMBAR PUNCTURE, WITH INTRATHECAL CHEMOTHERAPY ADMINISTRATION  INSERTION, PICC  Thoracentesis     Past Medical History:   Diagnosis Date     Migraine 2006    have resolved      History reviewed. No pertinent surgical history.       Anesthesia Evaluation     . Pt has not had prior anesthetic            ROS/MED HX    ENT/Pulmonary: Comment: SOB. Worst with the recumbent position      Neurologic:       Cardiovascular:         METS/Exercise Tolerance:     Hematologic:         Musculoskeletal:         GI/Hepatic:         Renal/Genitourinary:         Endo:         Psychiatric:         Infectious Disease:         Malignancy:   (+) Malignancy History of Lymphoma/Leukemia  Upper anterior mediastinal mass. Cardiac tamponade physiology released by pleural effusion and pericardiocentesis procedure        Other:                         PHYSICAL EXAM:   Mental Status/Neuro: A/A/O   Airway: Facies: Feasible  Mallampati: I  Mouth/Opening: Full  TM distance: > 6 cm  Neck ROM: Full   Respiratory: Auscultation: CTAB     Resp. Rate: Normal     Resp. Effort: Normal      CV: Rhythm: Regular  Rate: Age appropriate  Heart: Normal Sounds  Edema: None   Comments:      Dental: Normal Dentition                LABS:  CBC:   Lab Results   Component Value Date    WBC 5.2 2019    HGB 13.4 2019    HCT 40.5 2019     2019     BMP:   Lab Results   Component Value Date     2019    POTASSIUM 4.0 2019    CHLORIDE 105 2019    CO2 27 2019    BUN 6 (L) 2019    CR 0.60 (L) 2019    GLC 95 2019     COAGS:   Lab Results   Component Value Date    PTT 33 2019    INR 1.17 (H) 2019    FIBR 458 (H) 2019     POC: No results found for: BGM, HCG,  "HCGS  OTHER:   Lab Results   Component Value Date    MICHAEL 8.4 (L) 08/30/2019    PHOS 3.8 08/30/2019    MAG 2.3 08/30/2019    ALBUMIN 3.1 (L) 08/30/2019    PROTTOTAL 6.4 (L) 08/30/2019    ALT 18 08/30/2019    AST 21 08/30/2019    ALKPHOS 92 08/30/2019    BILITOTAL 0.5 08/30/2019        Preop Vitals    BP Readings from Last 3 Encounters:   08/31/19 118/78   08/22/13 100/66 (9 %/ 47 %)*   08/19/10 (!) 88/56 (3 %/ 29 %)*     *BP percentiles are based on the August 2017 AAP Clinical Practice Guideline for boys    Pulse Readings from Last 3 Encounters:   08/31/19 87   08/22/13 93   08/19/10 56      Resp Readings from Last 3 Encounters:   08/31/19 23   08/22/13 20   08/19/10 24    SpO2 Readings from Last 3 Encounters:   08/31/19 94%   08/22/13 98%      Temp Readings from Last 1 Encounters:   08/31/19 37.2  C (98.9  F) (Oral)    Ht Readings from Last 1 Encounters:   08/30/19 1.825 m (5' 11.85\")      Wt Readings from Last 1 Encounters:   08/30/19 75.2 kg (165 lb 12.6 oz)    Estimated body mass index is 22.58 kg/m  as calculated from the following:    Height as of this encounter: 1.825 m (5' 11.85\").    Weight as of this encounter: 75.2 kg (165 lb 12.6 oz).     LDA:  Peripheral IV 08/30/19 Left Lower forearm (Active)   Site Assessment WDL 8/31/2019  6:24 AM   Line Status Infusing;Checked every 1-2 hour 8/31/2019  6:24 AM   Phlebitis Scale 0-->no symptoms 8/31/2019  6:24 AM   Infiltration Scale 0 8/31/2019  6:24 AM   Extravasation? No 8/31/2019  6:24 AM   Number of days: 1        Assessment:   ASA SCORE: 3    H&P: History and physical reviewed and following examination; no interval change.         Plan:   Anes. Type:  MAC   Pre-Medication: None   Induction:  N/a   Airway: Native Airway   Access/Monitoring: PIV   Maintenance: N/a     Postop Plan:   Postop Pain: None  Postop Sedation/Airway: Not planned     CONSENT: Direct conversation   Plan and risks discussed with: Patient   Blood Products: Consent Deferred (Minimal Blood " Loss)                   Bernabe Chung MD

## 2019-08-31 NOTE — PROGRESS NOTES
[facility], [place]    Pediatric Intensive Care Transfer Accept Note  Date of Service (when I saw the patient): 08/31/2019     Assessment & Plan   Lazaro Lund is a 19 year old male with acute onset cough recently found to have anterior mediastinal mass and malignant left-sided pleural effusion concerning for lymphoma versus leukemia. 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 He was admitted to [facility] oncology service yesterday, and underwent LP with intrathecal chemotherapy, PICC placement, and chest tube placement this evening 8/31. He is transferring to the ICU for close monitoring of respiratory status and tumor lysis labs.     HEME/ONC  1. 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 Pericardial effusion  2. Tamponade  3. Orthopnea  -Cardiology consulted  -Repeat echo on 9/1; currently effusion too small for pericardiocentesis  -Do NOT lay flat, head of bed > 60 degrees at all times  -Adult telemetry    FEN  1. At risk for tumor lysis syndrome  -BMP, mag, phos, uric acid q6h  -Daily albumnin  -Allopurinol 150 mg TID  -Rasburicase 6 mg once tonight   -D5 NS @ 170/hr (1.5 MIVF); do not add K+  -NPO    RESP  1. Left pleural effusion, s/p chest tube placement POD 0  -Oxygen NC wean as tolerated  -Chest tube to suction -20    GI  - IV famotidine 20 mg BID for gut protection while on steroids    ENT  1. Dysphagia  -NPO, except some meds    NEURO  -Benadryl q6h IV PRN  -Melatonin 3 mg at bedtime PRN  -Scheduled Tylenol 650 mg PO q6h scheduled     Access: PICC  Dispo: >7 days given work up and stabilization of respiratory status.    Patient seen and  discussed with pediatric attending, Dr. Lutz.    Lew Patterson MD  N Pediatrics PGY-2    Interval History   Tolerated his LP and chemotherapy, chest tube placement, and R arm PICC placement well with minimal sedation. Had +100 mL fluid in addition to what is currently in chest tube reservoir. Had 15 minutes of increased work of breathing after chest tube placed, but has stabilized and been more comfortable since. There was tumor in left upper extremity vessels, so line placement there was avoided. He continues to have a cough, and prefers being upright.     Physical Exam   Temp: 98.4  F (36.9  C) Temp src: Axillary BP: (!) 111/97 Pulse: 87 Heart Rate: 67 Resp: 18 SpO2: 95 % O2 Device: None (Room air)    Vitals:    08/30/19 1523 08/30/19 1811   Weight: 75.5 kg (166 lb 7.2 oz) 75.2 kg (165 lb 12.6 oz)     Vital Signs with Ranges  Temp:  [98.1  F (36.7  C)-99.5  F (37.5  C)] 98.4  F (36.9  C)  Pulse:  [85-87] 87  Heart Rate:  [] 67  Resp:  [18-23] 18  BP: (106-129)/(60-97) 111/97  SpO2:  [91 %-97 %] 95 %  I/O last 3 completed shifts:  In: 3492.08 [P.O.:480; I.V.:3012.08]  Out: 1100 [Urine:1100]    PE:  Appearance: Alert and appropriate, well developed, nontoxic.  HEENT: Head: Atraumatic.   Eyes: PERRL, EOM grossly intact, conjunctivae and sclerae clear. Right eye opens wider than left.    Ears: Tympanic membranes clear bilaterally, without inflammation or effusion.   Nose: Nares clear with no active discharge.    Mouth/Throat: Moist mucous membranes. No oral lesions, pharynx clear with no erythema or exudate. Right side mouth droop.   Neck: Supple, no masses. Left sided cervical and supraclavicular LAD.  Respiratory: Mild increased work of breathing. Moderate air movement bilaterally. Expiratory stridor bilaterally, intermittent inspiratory stridor. No rales, rhonchi, or wheezing.  Cardiovascular: Regular rate and rhythm, normal S1 and S2, with no murmurs. Heart sounds distant. Normal symmetric peripheral  pulses and brisk cap refill.   Abdominal:  Soft, nontender, non-distended. No masses, but in sitting position, so hard to assess.  Neurologic: Alert and oriented, right side of mouth is drooping, left normal. Right eye wider than left.   Extremities/Back: No deformity.  Skin: No significant rashes, ecchymoses, or lacerations.     Medications     dextrose 5% and 0.9% NaCl 125 mL/hr at 19 1453       [Auto Hold] allopurinol  150 mg Oral TID     [Auto Hold] INTRATHECAL chemo - Cytarabine and/or methotrexate and/or Hydrocortisone   Intrathecal Once     [Auto Hold] sodium chloride (PF)  3 mL Intracatheter Q8H       Data   Results for orders placed or performed during the hospital encounter of 19 (from the past 24 hour(s))   Echo Pediatric (TTE) Complete    Narrative    [id number]  [id number]  [id number]  [id number]^ZIA^KAYLIN                                                                   Study ID: [id number]                                                 [facility] [address] [address]                                                Phone: [phone]                                Pediatric Echocardiogram  _____________________________________________________________________________  __     Name: LATRICEALBERTO RAMIREZERIKA PLUNKETT  Study Date: 2019 06:56 PM               Patient Location: [facility]  MRN: [id number]                               Age: 19 yrs  : 2000                               BP: 106/64 mmHg  Gender: Male                                  HR: 98  Patient Class: Emergency                      Height: 72 in  Ordering Provider: ZIA EASTON             Weight: 166 lb                                                BSA: 2.0 m2  Performed By: Sirisha Bennett  Report approved by: Savanah AYALA  MD Luke  Reason For Study: Other, Please Specify in Comments  _____________________________________________________________________________  __     ------CONCLUSIONS------  Normal intracardiac connections. There is a small to moderate circumferential  pericardial effusion. Echocardiographic findings suggestive of cardiac  tamponade. The inferior vena cava is dilated. The left and right ventricles  have normal chamber size, wall thickness, and systolic function. The  calculated single plane left ventricular ejection fraction from the 4 chamber  view is 65%. There is a large left pleural effusion. There is a large anterior  mediastinal mass.     _____________________________________________________________________________  __        Technical information:  A complete two dimensional, MMODE, spectral and color Doppler transthoracic  echocardiogram is performed. The study quality is good. Images are obtained  from parasternal, apical, subcostal and suprasternal notch views. ECG tracing  shows regular rhythm.     Segmental Anatomy:  There is normal atrial arrangement, with concordant atrioventricular and  ventriculoarterial connections.     Systemic and pulmonary veins:  The systemic venous return is normal. Normal coronary sinus. The inferior vena  cava is dilated. Color flow demonstrates flow from at least one pulmonary vein  entering the left atrium.     Atria and atrial septum:  Normal right atrial size. The left atrium is normal in size. There is no  atrial level shunting.        Atrioventricular valves:  The tricuspid valve is normal in appearance and motion. Trivial tricuspid  valve insufficiency. The mitral valve is normal in appearance and motion.  There is no mitral valve insufficiency. There is significant decrease (>20%)  in the MV E-wave velocity with inspiration.     Ventricles and Ventricular Septum:  The left and right ventricles have normal chamber size, wall thickness, and  systolic function. The  calculated single plane left ventricular ejection  fraction from the 4 chamber view is 65 %. There is no ventricular level  shunting.     Outflow tracts:  Normal great artery relationship. There is unobstructed flow through the right  ventricular outflow tract. The pulmonary valve motion is normal. There is  normal flow across the pulmonary valve. Trivial pulmonary valve insufficiency.  There is unobstructed flow through the left ventricular outflow tract.  Tricuspid aortic valve with normal appearance and motion. There is normal flow  across the aortic valve.     Great arteries:  The main pulmonary artery has normal appearance. There is unobstructed flow in  the main pulmonary artery. The pulmonary artery bifurcation is normal. There  is unobstructed flow in both branch pulmonary arteries. Normal ascending  aorta. The aortic arch appears normal. There is unobstructed antegrade flow in  the ascending, transverse arch, descending thoracic and abdominal aorta. There  is a left aortic arch with normal branching pattern. There is significant  decrease (>20%) in the peak flow velocity in the abdominal aorta with  inspiration.     Arterial Shunts:  The ductal region is not imaged with this study.     Coronaries:  Normal origin of the right and left proximal coronary arteries from the  corresponding sinus of Valsalva by 2D.        Effusions, catheters, cannulas and leads:  There is a left pleural effusion. There is a large anterior mediastinaal mass.  There is a moderate circumferential pericardial effusion. There is collapse of  the right ventricular free wall in diastole. Echocardiographic findings  suggestive of cardiac tamponade.     MMode/2D Measurements & Calculations  4 Chamber EF: 65.0 %               LVMI(BSA): 113.3 grams/m2  LVMI(Height): 43.3                 RWT(MM): 0.41        Doppler Measurements & Calculations  PA V2 max: 85.9 cm/sec  PA max PG: 3.0 mmHg     [place] 2D Z-SCORE VALUES  Measurement NameValue  Z-ScorePredictedNormal Range  LVLd apical(4ch)7.1 cm  LVLs apical(4ch)5.7 cm     [place] Z-Scores (Measurements & Calculations)  Measurement NameValue      Z-ScorePredictedNormal Range  IVSd(MM)        0.97 cm    -0.28  1.0      0.70 - 1.31  IVSs(MM)        1.4 cm     0.31   1.4      1.00 - 1.75  LVIDd(MM)       5.5 cm     0.74   5.2      4.4 - 5.9  LVIDs(MM)       3.0 cm     -1.0   3.4      2.6 - 4.1  LVPWd(MM)       1.1 cm     1.3    0.95     0.69 - 1.20  LVPWs(MM)       1.5 cm     -0.08  1.6      1.2 - 1.9  LV mass(C)d(MM) 221.4 grams0.86   186.6    126.1 - 276.2  FS(MM)          45.8 %     2.6    34.9     28.5 - 42.6           Report approved by: Jocy Flower 08/30/2019 10:04 PM      PEDS Cardiology IP Consult: Patient to be seen: Routine within 24 hrs; Call back #: [phone]; echocardiogram with pericardial effusion; Consultant may enter orders: Yes; Requesting provider? Attending physician    Rodriguez Stafford MD     8/31/2019  2:40 PM  [facility]   Heart Center Consult Note    Pediatric cardiology was asked to consult on this patient for   mediastinal mass and cardiac tamponade.        Assessment and Plan:   Lazaro is a 19 year old male, who was admitted 8/30 with newly   diagnosed lymphoma and a mediastinal mass. Clinically he is in no   acute distress, with stable vital signs, but reports orthopnea.   We are being consulted for cardiac evaluation with yesterday's   echo showing cardiac tamponade like physiology. The tamponade is   multifactorial and likely to the mediastinal mass, as well as the   pericardial and pleural effusions. The imaging was reviewed with   out interventional cardiologists and we agree that the   pericardial fluid is not abundant enough for pericardiocentesis   at this time.      Echo (8/30/19):   Normal intracardiac connections. There is a small to moderate   circumferential  pericardial effusion. Echocardiographic  findings suggestive of   cardiac  tamponade. The inferior vena cava is dilated. The left and right   ventricles  have normal chamber size, wall thickness, and systolic function.   The  calculated single plane left ventricular ejection fraction from   the 4 chamber  view is 65%. There is a large left pleural effusion. There is a   large anterior  mediastinal mass.    Recommendations:  - Encourage pleural effusion drainage, which we are hopeful will   help to reduce the tamponade physiology  - Repeat echo tomorrow 9/1, to assess for changes in tamponade   and pericardial effusion  - Pericardial effusion is not large enough at this time for   pericardiocentesis, will follow on repeat echo tomorrow  - Monitor vitals closely     Rodriguez Mensah MD  Pediatric Cardiology Fellow  [facility]  Page: [phone]        Attending Attestation:       HPI:   Lazaro Lund is a 19 year old male who presents after work-up   for his 3 weeks of cough with diagnosis of likely lymphoma based   on initial outside studies. About 3 weeks ago, he went in to   clinic for evaluation of his cough that have been bothering him   for 2 to 3 months.  He was diagnosed with bronchitis and given a   course of antibiotics.  He then returned to care (8/20/2019)   without improvement and got a chest x-ray that showed a   mediastinal mass and pleural fluid.  He then had the pleural   fluid removed and was sent for analysis (8/21/2019).  That led to   a chest CT scan and a CT-guided biopsy of the mass last week   (8/27/2019). He has noticed enlargement of his submandibular and   left axillary lymph nodes. He complains of orthopnea and prefers   to sit up. He denies any fever, nausea, vomiting, diarrhea or   other concerns.    ROS:10 point ROS neg other than the symptoms noted above in the   HPI.     PMH:     No previous cardiac history.  Past Medical History:   Diagnosis Date     Migraine 2006    have resolved      Family History:     No  known family history of cardiac defects.   Family History   Problem Relation Age of Onset     No Known Problems Mother      No Known Problems Father      Asthma Brother      Thyroid Cancer Paternal Grandmother      Melanoma Paternal Aunt          Social History:     Social History     Socioeconomic History     Marital status: Single     Spouse name: Not on file     Number of children: Not on file     Years of education: Not on file     Highest education level: Not on file   Occupational History     Not on file   Social Needs     Financial resource strain: Not on file     Food insecurity:     Worry: Not on file     Inability: Not on file     Transportation needs:     Medical: Not on file     Non-medical: Not on file   Tobacco Use     Smoking status: Never Smoker     Smokeless tobacco: Never Used   Substance and Sexual Activity     Alcohol use: No     Drug use: No     Sexual activity: Never   Lifestyle     Physical activity:     Days per week: Not on file     Minutes per session: Not on file     Stress: Not on file   Relationships     Social connections:     Talks on phone: Not on file     Gets together: Not on file     Attends [employer] service: Not on file     Active member of club or organization: Not on file     Attends meetings of clubs or organizations: Not on file     Relationship status: Not on file     Intimate partner violence:     Fear of current or ex partner: Not on file     Emotionally abused: Not on file     Physically abused: Not on file     Forced sexual activity: Not on file   Other Topics Concern     Not on file   Social History Narrative    Lives at home with Dad and Step-Mom. Has 3 step-siblings that   are in the house every other week.     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 Has completed high school.             Review of Systems:   Pertinent positive Review of Systems in the  history           Medications:        dextrose 5% and 0.9% NaCl 125 mL/hr at 08/31/19 1212       allopurinol  150 mg Oral TID     INTRATHECAL chemo - Cytarabine and/or methotrexate and/or   Hydrocortisone   Intrathecal Once     sodium chloride (PF)  3 mL Intracatheter Q8H   dexamethasone, sodium chloride (PF)        Physical Exam:     Vital Ranges Hemodynamics   Temp:  [98.1  F (36.7  C)-99.5  F (37.5  C)] 98.8  F (37.1  C)  Pulse:  [85-87] 87  Heart Rate:  [] 67  Resp:  [12-23] 20  BP: (106-129)/(60-78) 129/75  SpO2:  [91 %-99 %] 95 % BP - Mean:  [80] 80     Vitals:    08/30/19 1523 08/30/19 1811   Weight: 75.5 kg (166 lb 7.2 oz) 75.2 kg (165 lb 12.6 oz)   Weight change:   I/O last 3 completed shifts:  In: 2621.25 [P.O.:480; I.V.:2141.25]  Out: 300 [Urine:300]    General -  Sitting upright, interactive and comfortable,   Well-appearing in NAD   HEENT -  NCAT, MMM   Cardiac -  RRR, distant heart sounds, normal S1/S2, No murmur, No   rubs/gallops   Respiratory -  CTAB, unlabored, decreased air movement on left   Abdominal -  Soft, NT, ND, no HSM   Ext / Skin -  WWP, 2+ pulses   Neuro -  Appropriate for age       Labs/Imaging   Recent studies and labs were reviewed in EMR.  Pertinent studies   are as follows:     CXR 8/30/19:                                                               1. Superior mediastinal mass.  2. Small left pleural effusion with adjacent left basilar   opacities,  favoring atelectasis. Infection would be difficult to exclude.     Interventional Radiology Adult/Peds IP Consult: Patient to be seen: Routine within 24 hours; Call back #: [phone]; PICC line placement and thoracentesis in new mediastinal mass patient; to be coordinated with LP and bone marrow biopsy on 8/31...    Narrative    Michell Keith MD     8/30/2019  9:19 PM    INTERVENTIONAL RADIOLOGY CONSULT NOTE    Reason for referral:   PICC placement and left thoracentesis    History:   19-year-old patient with recently  diagnosed leukemia after being   found to have large mediastinal mass.  Patient is undergoing bone   marrow biopsy and lumbar puncture in the OR tomorrow, and PICC   placement for commencement of chemotherapy and diagnostic left   thoracentesis have been requested at the same time, with   anesthesia support.    Labs:  Lab Results   Component Value Date    HGB 13.4 08/30/2019     Lab Results   Component Value Date     08/30/2019     Lab Results   Component Value Date    WBC 5.2 08/30/2019       Lab Results   Component Value Date    INR 1.17 08/30/2019       Imaging:   Outside CT chest shows large superior mediastinal mass.  The   right subclavian, innominate vein and SVC are patent, but there   is inferior SVC stenosis.  The left innominate vein is not seen,   and likely compressed by the mass.  There is a moderate sized   left pleural effusion.    Assessment/Plan:   Patient will be placed on IR schedule for PICC placement and left   thoracentesis tomorrow.  IR will contact the primary team   tomorrow regarding possible times we will be available for the   above procedures in the OR.     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 All physician orders and treatments were placed at my direction.  Formulated plan with the house staff team or resident(s) and agree with the findings and plan in this note.  I have evaluated all laboratory values and imaging studies from the past 24 hours.  Consults ongoing and ordered are Oncology, cardiology  I personally managed the respiratory and hemodynamic support, metabolic abnormalities, nutritional status, antimicrobial  therapy, and pain/sedation management.   Key decisions made today included starting dexamethasone and rasburicase now, TLS labs q6h, NPO until improvement in symptoms, ECHO in AM to assess pericardial effusion.  Monitor chest tube drainage and replace albumin as needed. BM bx and abdomina US tomorrow as he cannot be flat for a CT  Procedures that will happen in the ICU today are: none  The above plans and care have been discussed with parents and all questions and concerns were addressed.  I spent a total of 60 minutes providing critical care services at the bedside, and on the critical care unit, evaluating the patient, directing care and reviewing laboratory values and radiologic reports for Lazaro Lund.    Saida Lutz MD

## 2019-08-31 NOTE — CONSULTS
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  Patient is undergoing bone marrow biopsy and lumbar puncture in the OR tomorrow, and PICC placement for commencement of chemotherapy and diagnostic left thoracentesis have been requested at the same time, with anesthesia support.    Labs:  Lab Results   Component Value Date    HGB 13.4 08/30/2019     Lab Results   Component Value Date     08/30/2019     Lab Results   Component Value Date    WBC 5.2 08/30/2019       Lab Results   Component Value Date    INR 1.17 08/30/2019       Imaging:   Outside CT chest shows large superior mediastinal mass.  The right subclavian, innominate vein and SVC are patent, but there is inferior SVC stenosis.  The left innominate vein is not seen, and likely compressed by the mass.  There is a moderate sized left pleural effusion.    Assessment/Plan:   Patient will be placed on IR schedule for PICC placement and left thoracentesis tomorrow.  IR will contact the primary team tomorrow regarding possible times we will be available for the above procedures in the OR.

## 2019-08-31 NOTE — PLAN OF CARE
Afebrile. Satting 92-95% on room air while sleeping; some dips to 90-91% with relatively quick recovery to 92%, MD notified. Some wheezing audible in YANA and LLL while sleeping, MD notified. Frequent dry cough. HOB remains at 60 degrees. OVSS. Heart sounds are muffled. NPO as of midnight. Plan to have PICC placement, LP , bone marrow biopsy, and left thoracentesis. PIV infusing MIVF. Mom is at the bedside and is attentive to pt's needs. Hourly rounding completed.

## 2019-08-31 NOTE — CONSULTS
[facility], [place]    Pediatric Hematology / Oncology Consultation     Date of Admission:  8/30/2019    Assessment & Plan   Lazaro Lund is a 19 year old male admitted on 8/30/2019 with mediastinal mass, adenopathy, and pleural effusion (sampled at OSH by flow and found to have neoplastic T cells) and subsequently identified pericardial effusion with evidence of tamponade physiology who is now status post left side thoracentesis and chest tube placement and diagnostic LP. Due to his symptomatic superior mediastinal syndrome, we have been unable to obtain bone marrow biopsy or detailed staging imaging. He is being transferred to the PICU post-op for close respiratory monitoring given his airway compromise and to start therapy with high risk for tumour lysis syndrome. In addition, he is at increased risk for clotting given hypercoagulable state and compression of vessels from mass.    Recommendations  -Obtained complete abdominal US (for staging) and repeat ECHO on 9/1  -status post bone marrow biopsy (for staging) on 9/1  -Daily chest x-rays  -TLS monitoring: Q6H BMP with Mg, Phos, and uric acid  -TLS prophylaxis: 1.5x mIVF (ensure no potassium containing fluids), 150mg allopurinol TID, and now s/p one time dose of rasburicase  -Daily albumin due to brisk malignant effusion output  -s/p LP with IT cytarabine   -Plan to start induction therapy per [id number]. Note: Dexamethasone started on 8/31 pending logistical ability to perform bone marrow biopsy    Signed,  Nicho Fischer   Pediatric Hematology/Oncology Fellow      Reason for Consult   Reason for consult: I was asked by the pediatric intensive care unit to evaluate this patient for management of T lymphoblastic neoplasm.    Primary Care Physician   Rodriguez Montoya    Chief Complaint   T cell neoplasm    History is obtained from the patient and the patient's parent(s)    History of Present Illness   Lazaro Lund is a 19 year old  male who presents with mediastinal mass. His symptoms started 3 months ago with cough that persisted. 1 month ago, he was seen by his PCP and prescribed antibiotics. When he did not have improvement of symptoms, he was seen on 8/20/19 again and an XR revealed pleural effusion and mediastinal msas. He had a thoracentesis on 8/21 followed by a chest CT and repeat thoracentesis on 8/27. 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 His symptoms that started with cough progressed over the past few weeks to having shortness of breath with lying down, cough also worse with lying down, decreased appetite, and fatigue from poor sleep quality. He did not have recurrent fevers, night sweats, or weight loss. He has noticed tender swollen glands on his left neck and left axilla. No headaches, back pain, abdominal pain, abdominal distention, testicular swelling    Past Medical History    Born term  No chronic illnesses    Past Surgical History   Past surgical history reviewed with no previous surgeries identified.    Immunization History   Immunization Status:  Delayed per [facility]    Prior to Admission Medications   Prior to Admission Medications   Prescriptions Last Dose Informant Patient Reported? Taking?   NO ACTIVE MEDICATIONS   Yes No   Sig: .      Facility-Administered Medications: None     Allergies   Allergies   Allergen Reactions     No Known Drug Allergies        Social History   I have updated and reviewed the following Social History Narrative:   Pediatric History   Patient Guardian Status     Mother:  SHARONKADEEMJHONATHAN     Father:  AVNI RODRIGUEZ     Other Topics Concern     Not on file   Social History Narrative    Lives at home in [place] with Dad and Step-Mom. Biological mother is involved in his life and accompanied him during this hospitalization. Has 4 step-siblings and 1 biological sibling. 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 Has completed high school.       Family History   I have reviewed this patient's family history and updated it with pertinent information if needed.   Family History   Problem Relation Age of Onset     No Known Problems Mother      No Known Problems Father      Asthma Brother      Thyroid Cancer Paternal Grandmother      Melanoma Paternal Aunt        Review of Systems   The 10 point Review of Systems is negative other than noted in the HPI or here.     Physical Exam   Temp: 98.4  F (36.9  C) Temp src: Axillary BP: (!) 111/97 Pulse: 87 Heart Rate: 67 Resp: 18 SpO2: 95 % O2 Device: None (Room air)    Vital Signs with Ranges  Temp:  [98.4  F (36.9  C)-99.5  F (37.5  C)] 98.4  F (36.9  C)  Pulse:  [85-87] 87  Heart Rate:  [67-78] 67  Resp:  [18-23] 18  BP: (111-129)/(60-97) 111/97  SpO2:  [91 %-95 %] 95 %  165 lbs 12.57 oz     Appearance: Alert and appropriate, well developed, nontoxic, with moist mucous membranes.  HEENT: Head: Normocephalic and atraumatic. Eyes: PERRL, EOM grossly intact, conjunctivae and sclerae clear. Nose: Nares clear with no active discharge.  Mouth/Throat: No oral lesions, pharynx clear with no erythema or exudate.  Neck: Supple, left side mildly tender masses, with significant cervical lymphadenopathy  Pulmonary: No grunting, flaring, retractions or stridor. Good air entry, clear to auscultation on right, no wheezing, rales in left base with inspiration and expiration.   Cardiovascular: Regular rate and rhythm, normal S1 and S2, with no murmurs.  Normal symmetric peripheral pulses and brisk cap refill.  Abdominal: Normal bowel sounds, soft, nontender, nondistended, with no masses and no hepatosplenomegaly.  Neurologic: Alert and oriented, cranial nerves II-XII grossly intact, moving all extremities equally with grossly normal coordination and normal  gait.  Extremities/Back: No deformity, no CVA tenderness.  Skin: No significant rashes, ecchymoses, or lacerations.    Data   Results for orders placed or performed during the hospital encounter of 08/30/19 (from the past 24 hour(s))   Glucose by meter   Result Value Ref Range    Glucose 80 70 - 99 mg/dL   Cell count with differential CSF:   Result Value Ref Range    WBC CSF 0 0 - 5 /uL    RBC CSF 0 0 - 2 /uL    Tube Number 2 #    Color CSF Colorless CLRL^Colorless    Appearance CSF Clear CLER^Clear   Glucose CSF:   Result Value Ref Range    Glucose CSF 60 40 - 70 mg/dL   Protein total CSF:   Result Value Ref Range    Protein Total CSF 20 15 - 60 mg/dL   Cell count with differential fluid   Result Value Ref Range    Body Fluid Analysis Source Pleural fluid     % Neutrophils Fluid 1 %    % Lymphocytes Fluid 73 %    % Mono/Macro Fluid 25 %    % Eosinophils Fluid 1 %    Color Fluid Yellow     Appearance Fluid Cloudy     WBC Fluid 17219 /uL   IR PICC Placement > 5 Yrs of Age    Narrative    This exam was marked as non-reportable because it will not be read by a   radiologist or a [facility] non-radiologist provider.             XR Surgery AUTUMN L/T 5 Min Fluoro w Stills    Narrative    Procedures: Image-guided right upper extremity PICC line placement,  8/31/2019    Clinical indication: 19-year-old with recently diagnosed leukemia.  Need for central venous access for chemotherapy administration.    Comparison studies: 8/30/2019.    PROCEDURE:        Interventional radiologists: Michell Escamilla MD (IR staff)    Fellow: Opal Isbell MD    Consent: verbal and written informed consent obtained prior to  procedure.    Procedure details: Patient placed in the seated position. The right  upper extremity was prepped and draped in standard sterile fashion  after preprocedural ultrasound with tourniquet demonstrated patent  superficial veins, including the basilic vein. Timeout performed. 1%  lidocaine was used to anesthetize the  dermis and proposed needle tract  adjacent to the basilic vein. Using ultrasound guidance, a 21-gauge  micropuncture needle was advanced into the basilic vein. Image saved  in the patient's chart. A microguidewire was advanced into the basilic  vein. The dilator with peel-away sheath advanced over the wire. The  C-arm was then used to identify the needle as it was advanced into the  mid SVC. A clamp was used to measure the length of the catheter. The  wire was then removed and the sheath locked. The 4 Wolof double lumen  PICC line was then cut to length and flushed with saline. It was  advanced over the peel-away sheath, which was subsequently removed.  Fluoroscopy confirmed positioning within the mid SVC. The catheter was  secured to the skin using 3-0 Ethilon suture. A sterile dressing was  placed. No immediate complication.     Medications: Per anesthesiology team. 1% lidocaine without epinephrine  was used for local anesthesia.    Monitoring: The patient was placed on continuous monitoring. Vital  signs and sedation monitored by nursing staff under my supervision.  The patient remained stable throughout the procedure.    Sedation time: Not applicable.    Fluoroscopy time: 0.2 minutes    Complications: None.      Impression    IMPRESSION:     Placement of 4 Wolof, dual-lumen PICC line via the right basilic  vein. The tip is positioned within the mid SVC.    PLAN:    Line available for immediate use.     XR Chest Port 1 View    Narrative    XR CHEST PORT 1 VW  8/31/2019 8:17 PM      HISTORY: Follow up after drainage of pleural effusion    COMPARISON: Previous day    FINDINGS:   Portable upright view of the chest. Right arm PICC tip projects over  the high SVC. There is a new small left basilar pleural catheter  partially visualized. Decrease in left-sided pleural effusion. New  tiny left pneumothorax.    The cardiac silhouette size is not enlarged. Mediastinal mass  redemonstrated. Mild patchy left basilar  opacities are decreased.      Impression    IMPRESSION:   Decreased pleural fluid with new tiny left pneumothorax following  pleural catheter placement.    FATOUMATA PINK MD   Basic metabolic panel   Result Value Ref Range    Sodium 141 133 - 144 mmol/L    Potassium 3.3 (L) 3.4 - 5.3 mmol/L    Chloride 107 94 - 109 mmol/L    Carbon Dioxide 25 20 - 32 mmol/L    Anion Gap 9 3 - 14 mmol/L    Glucose 111 (H) 70 - 99 mg/dL    Urea Nitrogen 3 (L) 7 - 30 mg/dL    Creatinine 0.58 (L) 0.66 - 1.25 mg/dL    GFR Estimate >90 >60 mL/min/[1.73_m2]    GFR Estimate If Black >90 >60 mL/min/[1.73_m2]    Calcium 7.7 (L) 8.5 - 10.1 mg/dL   Phosphorus   Result Value Ref Range    Phosphorus 3.8 2.5 - 4.5 mg/dL   Magnesium   Result Value Ref Range    Magnesium 1.8 1.6 - 2.3 mg/dL   Uric acid   Result Value Ref Range    Uric Acid 3.0 (L) 3.5 - 7.2 mg/dL   Uric acid   Result Value Ref Range    Uric Acid <0.2 (L) 3.5 - 7.2 mg/dL   Albumin level   Result Value Ref Range    Albumin 2.7 (L) 3.4 - 5.0 g/dL   Magnesium   Result Value Ref Range    Magnesium 2.2 1.6 - 2.3 mg/dL   Phosphorus   Result Value Ref Range    Phosphorus 4.1 2.5 - 4.5 mg/dL   Basic metabolic panel   Result Value Ref Range    Sodium 140 133 - 144 mmol/L    Potassium 4.0 3.4 - 5.3 mmol/L    Chloride 109 94 - 109 mmol/L    Carbon Dioxide 25 20 - 32 mmol/L    Anion Gap 6 3 - 14 mmol/L    Glucose 181 (H) 70 - 99 mg/dL    Urea Nitrogen 6 (L) 7 - 30 mg/dL    Creatinine 0.59 (L) 0.66 - 1.25 mg/dL    GFR Estimate >90 >60 mL/min/[1.73_m2]    GFR Estimate If Black >90 >60 mL/min/[1.73_m2]    Calcium 8.0 (L) 8.5 - 10.1 mg/dL   US Abdomen Complete Portable    Narrative    EXAMINATION: US ABDOMEN COMPLETE PORTABLE  9/1/2019 7:53 AM      CLINICAL HISTORY: evaluate for hepatosplenomegaly and lymphadenopathy  in newly diagnosed lymphoma patient    COMPARISON: None available.        FINDINGS:  The liver is normal in contour and echogenicity. The liver measures  16.3 cm in craniocaudal  dimension. There is no intrahepatic or  extrahepatic biliary ductal dilatation. The common bile duct measures  3 mm. The gallbladder is normal, without gallstones, wall thickening,  or pericholecystic fluid.    The spleen measures maximally 11.6 cm and is normal in appearance. The  visualized portions of the pancreas are normal in echogenicity.    The visualized upper abdominal aorta and inferior vena cava are  normal.      The kidneys are normal in position and demonstrate borderline  increased echogenicity. The right kidney measures 14.2 cm and the left  kidney measures 12.8 cm. There is no significant urinary tract  dilation. The urinary bladder is moderately distended with significant  echogenic debris. The bladder wall is normal.    There is a small right pleural effusion.      Impression    IMPRESSION:   1. Liver size at the upper limits of normal. No splenomegaly.  2. Borderline echogenic renal parenchyma, which can be seen with  medical renal disease. Asymmetric nephromegaly on the right.  3. Significant amount of debris within the bladder. Recommend  correlation with urinalysis.  4. Small right pleural effusion.    I have personally reviewed the examination and initial interpretation  and I agree with the findings.    FATOUMATA PINK MD     I personally saw and examined the patient with the fellow, Dr. Fischer as above.  I personally reviewed the laboratory and imaging results as above. I agree with the assessment and plan as above.  Heather Lopez MSc., MD

## 2019-08-31 NOTE — PROGRESS NOTES
08/31/19 1453   Child Life   Location Med/Surg   Intervention Supportive Check In   Family Support Comment Father, mother and step mother present.  Pt easily engaged with staff and stated the new plan was to get a PICC line.  We discussed the differences on PICU  and pt said he'd be there overnight.  Pt was using his laptop for distraction and  denied any further questions.  Family appropriate supportive.   Anxiety Low Anxiety   Major Change/Loss/Stressor/Fears other (see comments)   Techniques to [place] with Loss/Stress/Change diversional activity;family presence   Outcomes/Follow Up Continue to Follow/Support

## 2019-08-31 NOTE — PLAN OF CARE
Afebrile, vitals stable. Denies pain/discomfort. Increased work of breathing noted, unchanged from previous and unchanged this shift. Remains NPO this shift due to procedures delayed. Adequate urine output. Continue plan of care and to monitor.

## 2019-08-31 NOTE — PROCEDURES
[facility] Brief Procedure Note    Pre-operative diagnosis: Recent diagnosis leukemia   Post-operative diagnosis Same   Procedure: 1. Left chest tube placement  2. Right arm PICC placement   Surgeon: Michell Escamilla MD   Assistants(s): Opal Isbell MD   Estimated blood loss: Minimal        Findings: 1. 8 Fijian non-locking chest tube placed into left pleural effusion. 1800 ml drained. Sample sent to lab.  2. 4 Fijian dual lumen PICC placed via right basilic vein. Length 38 cm. Tip in high SVC due to low SVC stenosis from mediastinal mass.   Complications: None.

## 2019-08-31 NOTE — ED NOTES
08/30/19 2003   Child Life   Location ED   Intervention Initial Assessment;Family Support;Supportive Check In   Family Support Comment Intro to CFL services and self. Pts mother present and supportive. This will be his [facility] admission to [facility]. Mom appeared appropriately stressed as she received a call about pts labs and came straight from work. This writer did not get detailed information on other family members who may benefit from support in the household (siblings, etc)   Impact on Inpatient Care This writer provided two admit kits for pt and mom as they came to the hospital without overnight hygiene supplies. Pt also given a blanket for comfort during admission.    Anxiety Moderate Anxiety  (Pt appeared anxious but calm)   Major Change/Loss/Stressor/Fears medical condition, self  (New oncology diagnosis)   Techniques to [place] with Loss/Stress/Change family presence   Able to Shift Focus From Anxiety   (Pt did not use distraction for IV placement )   Outcomes/Follow Up Provided Materials;Continue to Follow/Support;Referral  (Referral made to for CFLS to follow up post ED visit )

## 2019-08-31 NOTE — PROGRESS NOTES
[facility], [place]    Progress Note - Heme/Onc Service        Date of Admission:  8/30/2019    Assessment & Plan   Lazaro Lund is a 19 year old male admitted on 8/30/2019 with chronic cough, mediastinal mass, and pleural effusion with neoplastic T cells found to have pericardial effusion with tamponade physiology. He was admitted for further evaluation and management of T cell lymphoma v. Leukemia and close clinical monitoring. Currently stable on RA without hypotension or tachycardia.    T cell lymphoma v. Leukemia  - Plan for BM biopsy, PICC placement, pleurocentesis, and LP with IT chemo 8/31  - Will transfer to PICU post-procedures for close observation    Orthopnea 2/2 mediastinal mass  - HOB elevated  - Will consider O2 supplementation for sats <90%    Cardiac tamponade physiology  - Cardiology consult, appreciate recs  - Plan for repeat echo 9/1    Risk of tumor lysis syndrome  - Allopurinol 150 mg TID       Diet: NPO per Anesthesia Guidelines for Procedure/Surgery Except for: Meds    Fluids: D5NS @ 125 mL/hr  Lara Catheter: not present  Code Status: Full    Disposition Plan   Expected discharge: > 7 days, pending further characterization and treatment of T cell lymphoma v. leukemia  Entered: Leni Little MD 08/31/2019, 8:31 AM       The patient's care was discussed with the Attending Physician, Dr. Lopez.    Leni Little MD, DPhil  Pediatric Resident, PGY-1  [facility]    ______________________________________________________________________    Interval History   NAEO. Afebrile. O2 sats low 90s on RA. NPO at MN on mIVF.     Data reviewed today: I reviewed all medications, new labs and imaging results over the last 24 hours. I personally reviewed no images or EKG's today.    Physical Exam   Vital Signs: Temp: 98.5  F (36.9  C) Temp src: Oral BP: 123/76 Pulse: 87 Heart Rate: 78 Resp: 21 SpO2: 91 % O2 Device: None (Room air)    Weight: 165 lbs 12.57  oz  General Appearance: Sleeping comfortably, HOB elevated, no acute distress  Head: Normocephalic, atraumatic  Eyes: Closed, no periorbital edema  ENT: No nasal discharge, MMM  Lymph: Large, firm lymph nodes left neck and supraclavicular area, left axilla large firm tender nodes, no inguinal nodes palpable  Respiratory: Breathing comfortably on room air, decreased air entry right base, no wheezes/rales/rhonchi  Abdomen: BS +, soft, non-tender, no hepatosplenomegally  Skin: No rashes or lesions on visible skin      Data   Recent Labs   Lab 08/31/19 2120 08/30/19  1557   WBC  --  5.2   HGB  --  13.4   MCV  --  82   PLT  --  430   INR  --  1.17*    140   POTASSIUM 3.3* 4.0   CHLORIDE 107 105   CO2 25 27   BUN 3* 6*   CR 0.58* 0.60*   ANIONGAP 9 8   MICHAEL 7.7* 8.4*   * 95   ALBUMIN  --  3.1*   PROTTOTAL  --  6.4*   BILITOTAL  --  0.5   ALKPHOS  --  92   ALT  --  18   AST  --  21     I personally saw and examined the patient with the resident as above.  I personally reviewed the laboratory and imaging results as above. I agree with the assessment and plan as above.  Heather Lopez, MSc., MD

## 2019-08-31 NOTE — CONSULTS
[facility]   Heart Center Consult Note    Pediatric cardiology was asked to consult on this patient for mediastinal mass and cardiac tamponade.        Assessment and Plan:   Lazaro is a 19 year old male, who was admitted 8/30 with newly diagnosed lymphoma and a mediastinal mass. Clinically he is in no acute distress, with stable vital signs, but reports orthopnea. We are being consulted for cardiac evaluation with yesterday's echo showing cardiac tamponade physiology. The tamponade is multifactorial and likely to the mediastinal mass, as well as the pericardial and pleural effusions. The imaging was reviewed with interventional cardiologist and we agree that the pericardial fluid is not abundant enough for pericardiocentesis at this time.      Echo (8/30/19):   Normal intracardiac connections. There is a small to moderate circumferential  pericardial effusion. Echocardiographic findings suggestive of cardiac  tamponade. The inferior vena cava is dilated. The left and right ventricles  have normal chamber size, wall thickness, and systolic function. The  calculated single plane left ventricular ejection fraction from the 4 chamber  view is 65%. There is a large left pleural effusion. There is a large anterior  mediastinal mass.    Recommendations:  - Encourage pleural effusion drainage, which we are hopeful will help to reduce the tamponade physiology  - Repeat echo tomorrow 9/1  - Pericardial effusion is not large enough at this time for pericardiocentesis to be safely performed, will follow on repeat echo tomorrow  - Monitor vitals closely   - Contact cardiology with changes in clinical status    Rodriguez Mensah MD  Pediatric Cardiology Fellow  [facility]  Page: [phone]        Attending Attestation:   Physician Attestation   I, Savanah Butler MD, saw this patient with the resident and agree with the resident/fellow's findings and plan of care as documented in  the note.      I personally reviewed vital signs, medications, labs and imaging.    Savanah Butler MD  Date of Service (when I saw the patient): 8/31/19        HPI:   Lazaro Lund is a 19 year old male who presents after work-up for his 3 weeks of cough with diagnosis of likely lymphoma based on initial outside studies. About 3 weeks ago, he went in to clinic for evaluation of his cough that have been bothering him for 2 to 3 months.  He was diagnosed with bronchitis and given a course of antibiotics.  He then returned to care (8/20/2019) without improvement and got a chest x-ray that showed a mediastinal mass and pleural fluid.  He then had the pleural fluid removed and was sent for analysis (8/21/2019).  That led to a chest CT scan and a CT-guided biopsy of the mass last week (8/27/2019). He has noticed enlargement of his submandibular and left axillary lymph nodes. He complains of orthopnea and prefers to sit up. He denies any fever, nausea, vomiting, diarrhea or other concerns.    ROS:10 point ROS neg other than the symptoms noted above in the HPI.     PMH:     No previous cardiac history.  Past Medical History:   Diagnosis Date     Migraine 2006    have resolved      Family History:     No known family history of cardiac defects.   Family History   Problem Relation Age of Onset     No Known Problems Mother      No Known Problems Father      Asthma Brother      Thyroid Cancer Paternal Grandmother      Melanoma Paternal Aunt          Social History:     Social History     Socioeconomic History     Marital status: Single     Spouse name: Not on file     Number of children: Not on file     Years of education: Not on file     Highest education level: Not on file   Occupational History     Not on file   Social Needs     Financial resource strain: Not on file     Food insecurity:     Worry: Not on file     Inability: Not on file     Transportation needs:     Medical: Not on file     Non-medical: Not on file    Tobacco Use     Smoking status: Never Smoker     Smokeless tobacco: Never Used   Substance and Sexual Activity     Alcohol use: No     Drug use: No     Sexual activity: Never   Lifestyle     Physical activity:     Days per week: Not on file     Minutes per session: Not on file     Stress: Not on file   Relationships     Social connections:     Talks on phone: Not on file     Gets together: Not on file     Attends Restoration service: Not on file     Active member of club or organization: Not on file     Attends meetings of clubs or organizations: Not on file     Relationship status: Not on file     Intimate partner violence:     Fear of current or ex partner: Not on file     Emotionally abused: Not on file     Physically abused: Not on file     Forced sexual activity: Not on file   Other Topics Concern     Not on file   Social History Narrative    Lives at home with Dad and Step-Mom. Has 3 step-siblings that are in the house every other week.     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 Has completed high school.             Review of Systems:   Pertinent positive Review of Systems in the history           Medications:        dextrose 5% and 0.9% NaCl 125 mL/hr at 08/31/19 1212       allopurinol  150 mg Oral TID     INTRATHECAL chemo - Cytarabine and/or methotrexate and/or Hydrocortisone   Intrathecal Once     sodium chloride (PF)  3 mL Intracatheter Q8H   dexamethasone, sodium chloride (PF)        Physical Exam:     Vital Ranges Hemodynamics   Temp:  [98.1  F (36.7  C)-99.5  F (37.5  C)] 98.8  F (37.1  C)  Pulse:  [85-87] 87  Heart Rate:  [] 67  Resp:  [12-23] 20  BP: (106-129)/(60-78) 129/75  SpO2:  [91 %-99 %] 95 % BP - Mean:  [80] 80     Vitals:    08/30/19 1523 08/30/19 1811   Weight: 75.5 kg (166 lb 7.2 oz) 75.2 kg (165 lb 12.6 oz)   Weight change:   I/O last 3 completed shifts:  In: 2714.25  [P.O.:480; I.V.:2141.25]  Out: 300 [Urine:300]    General -  Sitting upright, interactive and comfortable, Well-appearing in NAD   HEENT -  NCAT, MMM   Cardiac -  RRR, distant heart sounds, normal S1/S2, No murmur, No rubs/gallops   Respiratory -  CTAB, unlabored, decreased air movement on left   Abdominal -  Soft, NT, ND, no HSM   Ext / Skin -  WWP, 2+ pulses   Neuro -  Appropriate for age       Labs/Imaging   Recent studies and labs were reviewed in EMR.  Pertinent studies are as follows:     CXR 8/30/19:                                                             1. Superior mediastinal mass.  2. Small left pleural effusion with adjacent left basilar opacities,  favoring atelectasis. Infection would be difficult to exclude.

## 2019-09-01 ENCOUNTER — ANESTHESIA EVENT (OUTPATIENT)
Dept: SURGERY | Facility: CLINIC | Age: 19
DRG: 847 | End: 2019-09-01
Payer: COMMERCIAL

## 2019-09-01 ENCOUNTER — ANESTHESIA (OUTPATIENT)
Dept: SURGERY | Facility: CLINIC | Age: 19
DRG: 847 | End: 2019-09-01
Payer: COMMERCIAL

## 2019-09-01 ENCOUNTER — APPOINTMENT (OUTPATIENT)
Dept: CARDIOLOGY | Facility: CLINIC | Age: 19
DRG: 847 | End: 2019-09-01
Attending: PEDIATRICS
Payer: COMMERCIAL

## 2019-09-01 ENCOUNTER — APPOINTMENT (OUTPATIENT)
Dept: ULTRASOUND IMAGING | Facility: CLINIC | Age: 19
DRG: 847 | End: 2019-09-01
Attending: PEDIATRICS
Payer: COMMERCIAL

## 2019-09-01 ENCOUNTER — APPOINTMENT (OUTPATIENT)
Dept: GENERAL RADIOLOGY | Facility: CLINIC | Age: 19
DRG: 847 | End: 2019-09-01
Attending: STUDENT IN AN ORGANIZED HEALTH CARE EDUCATION/TRAINING PROGRAM
Payer: COMMERCIAL

## 2019-09-01 DIAGNOSIS — Z31.84 ENCOUNTER FOR FERTILITY PRESERVATION PROCEDURE: Primary | ICD-10-CM

## 2019-09-01 DIAGNOSIS — C95.00 ACUTE LEUKEMIA (H): ICD-10-CM

## 2019-09-01 LAB
ABSTINENCE DAYS: 5 DAYS (ref 2–7)
AGGLUTINATION: NO YES/NO
ALBUMIN SERPL-MCNC: 2.7 G/DL (ref 3.4–5)
ALBUMIN SERPL-MCNC: 2.7 G/DL (ref 3.4–5)
ALBUMIN SERPL-MCNC: NORMAL G/DL (ref 3.4–5)
ALP SERPL-CCNC: 74 U/L (ref 40–150)
ALP SERPL-CCNC: NORMAL U/L (ref 40–150)
ALT SERPL W P-5'-P-CCNC: 23 U/L (ref 0–70)
ALT SERPL W P-5'-P-CCNC: NORMAL U/L (ref 0–70)
ANALYSIS TEMP - CENTIGRADE: 23 CENTIGRADE
ANION GAP SERPL CALCULATED.3IONS-SCNC: 6 MMOL/L (ref 3–14)
ANION GAP SERPL CALCULATED.3IONS-SCNC: 6 MMOL/L (ref 3–14)
ANION GAP SERPL CALCULATED.3IONS-SCNC: 7 MMOL/L (ref 3–14)
ANION GAP SERPL CALCULATED.3IONS-SCNC: 8 MMOL/L (ref 3–14)
ANION GAP SERPL CALCULATED.3IONS-SCNC: NORMAL MMOL/L (ref 6–17)
AST SERPL W P-5'-P-CCNC: 8 U/L (ref 0–45)
AST SERPL W P-5'-P-CCNC: NORMAL U/L (ref 0–45)
BASOPHILS # BLD AUTO: 0 10E9/L (ref 0–0.2)
BASOPHILS NFR BLD AUTO: 0.1 %
BILIRUB SERPL-MCNC: 0.3 MG/DL (ref 0.2–1.3)
BILIRUB SERPL-MCNC: NORMAL MG/DL (ref 0.2–1.3)
BUN SERPL-MCNC: 11 MG/DL (ref 7–30)
BUN SERPL-MCNC: 12 MG/DL (ref 7–30)
BUN SERPL-MCNC: 6 MG/DL (ref 7–30)
BUN SERPL-MCNC: 9 MG/DL (ref 7–30)
BUN SERPL-MCNC: NORMAL MG/DL (ref 7–30)
CA-I BLD-MCNC: 4 MG/DL (ref 4.4–5.2)
CA-I BLD-MCNC: 4.5 MG/DL (ref 4.4–5.2)
CA-I BLD-MCNC: 4.7 MG/DL (ref 4.4–5.2)
CALCIUM SERPL-MCNC: 7.5 MG/DL (ref 8.5–10.1)
CALCIUM SERPL-MCNC: 8 MG/DL (ref 8.5–10.1)
CALCIUM SERPL-MCNC: NORMAL MG/DL (ref 8.5–10.1)
CELL FRAGMENTS: NORMAL %
CHLORIDE SERPL-SCNC: 109 MMOL/L (ref 94–109)
CHLORIDE SERPL-SCNC: 112 MMOL/L (ref 94–109)
CHLORIDE SERPL-SCNC: 113 MMOL/L (ref 94–109)
CHLORIDE SERPL-SCNC: 114 MMOL/L (ref 94–109)
CHLORIDE SERPL-SCNC: NORMAL MMOL/L (ref 94–109)
CO2 SERPL-SCNC: 23 MMOL/L (ref 20–32)
CO2 SERPL-SCNC: 23 MMOL/L (ref 20–32)
CO2 SERPL-SCNC: 24 MMOL/L (ref 20–32)
CO2 SERPL-SCNC: 25 MMOL/L (ref 20–32)
CO2 SERPL-SCNC: NORMAL MMOL/L (ref 20–32)
COLLECTION METHOD: NORMAL
COLLECTION SITE: NORMAL
CONSENT TO RELEASE TO PARTNER: YES
COPATH REPORT: NORMAL
CREAT SERPL-MCNC: 0.59 MG/DL (ref 0.66–1.25)
CREAT SERPL-MCNC: 0.71 MG/DL (ref 0.66–1.25)
CREAT SERPL-MCNC: 0.81 MG/DL (ref 0.66–1.25)
CREAT SERPL-MCNC: 0.83 MG/DL (ref 0.66–1.25)
CREAT SERPL-MCNC: NORMAL MG/DL (ref 0.66–1.25)
DIFFERENTIAL METHOD BLD: ABNORMAL
EOSINOPHIL # BLD AUTO: 0 10E9/L (ref 0–0.7)
EOSINOPHIL NFR BLD AUTO: 0 %
ERYTHROCYTE [DISTWIDTH] IN BLOOD BY AUTOMATED COUNT: 11.8 % (ref 10–15)
GFR SERPL CREATININE-BSD FRML MDRD: >90 ML/MIN/{1.73_M2}
GFR SERPL CREATININE-BSD FRML MDRD: NORMAL ML/MIN/{1.73_M2}
GLUCOSE BLD-MCNC: NORMAL MG/DL (ref 70–99)
GLUCOSE SERPL-MCNC: 133 MG/DL (ref 70–99)
GLUCOSE SERPL-MCNC: 141 MG/DL (ref 70–99)
GLUCOSE SERPL-MCNC: 149 MG/DL (ref 70–99)
GLUCOSE SERPL-MCNC: 181 MG/DL (ref 70–99)
GLUCOSE SERPL-MCNC: NORMAL MG/DL (ref 70–99)
HCT VFR BLD AUTO: 37 % (ref 40–53)
HGB BLD-MCNC: 12 G/DL (ref 13.3–17.7)
IMM GRANULOCYTES # BLD: 0.1 10E9/L (ref 0–0.4)
IMM GRANULOCYTES NFR BLD: 0.6 %
IMMATURE SPERM: NORMAL %
IMMOTILE: 49 %
LAB RECEIPT TIME: NORMAL
LIQUEFIED: YES YES/NO
LYMPHOCYTES # BLD AUTO: 0.6 10E9/L (ref 0.8–5.3)
LYMPHOCYTES NFR BLD AUTO: 5.9 %
MAGNESIUM SERPL-MCNC: 2.2 MG/DL (ref 1.6–2.3)
MAGNESIUM SERPL-MCNC: 2.3 MG/DL (ref 1.6–2.3)
MAGNESIUM SERPL-MCNC: NORMAL MG/DL (ref 1.6–2.3)
MCH RBC QN AUTO: 26.7 PG (ref 26.5–33)
MCHC RBC AUTO-ENTMCNC: 32.4 G/DL (ref 31.5–36.5)
MCV RBC AUTO: 82 FL (ref 78–100)
MONOCYTES # BLD AUTO: 0.4 10E9/L (ref 0–1.3)
MONOCYTES NFR BLD AUTO: 4.4 %
MRSA DNA SPEC QL NAA+PROBE: NEGATIVE
NEUTROPHILS # BLD AUTO: 8.5 10E9/L (ref 1.6–8.3)
NEUTROPHILS NFR BLD AUTO: 89 %
NON-PROGRESSIVE MOTILITY: 6 %
NRBC # BLD AUTO: 0 10*3/UL
NRBC BLD AUTO-RTO: 0 /100
PHOSPHATE SERPL-MCNC: 3.3 MG/DL (ref 2.5–4.5)
PHOSPHATE SERPL-MCNC: 4.1 MG/DL (ref 2.5–4.5)
PHOSPHATE SERPL-MCNC: 4.6 MG/DL (ref 2.5–4.5)
PHOSPHATE SERPL-MCNC: 4.7 MG/DL (ref 2.5–4.5)
PHOSPHATE SERPL-MCNC: NORMAL MG/DL (ref 2.5–4.5)
PLATELET # BLD AUTO: 398 10E9/L (ref 150–450)
POTASSIUM SERPL-SCNC: 3.7 MMOL/L (ref 3.4–5.3)
POTASSIUM SERPL-SCNC: 3.8 MMOL/L (ref 3.4–5.3)
POTASSIUM SERPL-SCNC: 3.9 MMOL/L (ref 3.4–5.3)
POTASSIUM SERPL-SCNC: 4 MMOL/L (ref 3.4–5.3)
POTASSIUM SERPL-SCNC: NORMAL MMOL/L (ref 3.4–5.3)
PROGRESSIVE MOTILITY: 45 % (ref 32–?)
PROT SERPL-MCNC: 6.2 G/DL (ref 6.8–8.8)
PROT SERPL-MCNC: NORMAL G/DL (ref 6.8–8.8)
RBC # BLD AUTO: 4.5 10E12/L (ref 4.4–5.9)
ROUND CELLS: 1.2 MILLION/ML (ref ?–2)
SODIUM SERPL-SCNC: 140 MMOL/L (ref 133–144)
SODIUM SERPL-SCNC: 142 MMOL/L (ref 133–144)
SODIUM SERPL-SCNC: 144 MMOL/L (ref 133–144)
SODIUM SERPL-SCNC: 144 MMOL/L (ref 133–144)
SODIUM SERPL-SCNC: NORMAL MMOL/L (ref 133–144)
SPECIMEN CONCENTRATION: 137 MILLION/ML (ref 15–?)
SPECIMEN PH: 8 PH (ref 7.2–?)
SPECIMEN SOURCE: NORMAL
SPECIMEN TYPE: NORMAL
SPECIMEN VOL UR: 1.7 ML (ref 1.5–?)
TIME OF ANALYSIS: NORMAL
TOTAL NUMBER: 233 MILLION (ref 39–?)
TOTAL PROGRESSIVE MOTILE: 105 MILLION (ref 15.6–?)
URATE SERPL-MCNC: 0.2 MG/DL (ref 3.5–7.2)
URATE SERPL-MCNC: 0.3 MG/DL (ref 3.5–7.2)
URATE SERPL-MCNC: 0.5 MG/DL (ref 3.5–7.2)
URATE SERPL-MCNC: <0.2 MG/DL (ref 3.5–7.2)
URATE SERPL-MCNC: NORMAL MG/DL (ref 3.5–7.2)
VISCOUS: YES YES/NO
VITALITY: NORMAL % (ref 58–?)
WBC # BLD AUTO: 9.5 10E9/L (ref 4–11)
WBC SPECIMEN: NORMAL %

## 2019-09-01 PROCEDURE — 25000128 H RX IP 250 OP 636: Performed by: NURSE ANESTHETIST, CERTIFIED REGISTERED

## 2019-09-01 PROCEDURE — 25000128 H RX IP 250 OP 636: Performed by: PEDIATRICS

## 2019-09-01 PROCEDURE — 88341 IMHCHEM/IMCYTCHM EA ADD ANTB: CPT | Performed by: STUDENT IN AN ORGANIZED HEALTH CARE EDUCATION/TRAINING PROGRAM

## 2019-09-01 PROCEDURE — 88313 SPECIAL STAINS GROUP 2: CPT | Performed by: STUDENT IN AN ORGANIZED HEALTH CARE EDUCATION/TRAINING PROGRAM

## 2019-09-01 PROCEDURE — 88305 TISSUE EXAM BY PATHOLOGIST: CPT | Performed by: STUDENT IN AN ORGANIZED HEALTH CARE EDUCATION/TRAINING PROGRAM

## 2019-09-01 PROCEDURE — 25000132 ZZH RX MED GY IP 250 OP 250 PS 637: Performed by: STUDENT IN AN ORGANIZED HEALTH CARE EDUCATION/TRAINING PROGRAM

## 2019-09-01 PROCEDURE — 84550 ASSAY OF BLOOD/URIC ACID: CPT | Performed by: PEDIATRICS

## 2019-09-01 PROCEDURE — 88237 TISSUE CULTURE BONE MARROW: CPT | Performed by: STUDENT IN AN ORGANIZED HEALTH CARE EDUCATION/TRAINING PROGRAM

## 2019-09-01 PROCEDURE — 25800030 ZZH RX IP 258 OP 636: Performed by: NURSE ANESTHETIST, CERTIFIED REGISTERED

## 2019-09-01 PROCEDURE — 86706 HEP B SURFACE ANTIBODY: CPT | Performed by: PEDIATRICS

## 2019-09-01 PROCEDURE — 82330 ASSAY OF CALCIUM: CPT | Performed by: STUDENT IN AN ORGANIZED HEALTH CARE EDUCATION/TRAINING PROGRAM

## 2019-09-01 PROCEDURE — 93306 TTE W/DOPPLER COMPLETE: CPT

## 2019-09-01 PROCEDURE — 31000247 ZZHCL STATISTICAL NEEDLE GAUGE EA SUPPLY: Performed by: STUDENT IN AN ORGANIZED HEALTH CARE EDUCATION/TRAINING PROGRAM

## 2019-09-01 PROCEDURE — 76700 US EXAM ABDOM COMPLETE: CPT

## 2019-09-01 PROCEDURE — 82330 ASSAY OF CALCIUM: CPT

## 2019-09-01 PROCEDURE — 40001005 ZZHCL STATISTIC FLOW >15 ABY TC 88189: Performed by: STUDENT IN AN ORGANIZED HEALTH CARE EDUCATION/TRAINING PROGRAM

## 2019-09-01 PROCEDURE — 84550 ASSAY OF BLOOD/URIC ACID: CPT

## 2019-09-01 PROCEDURE — 20300000 ZZH R&B PICU UMMC

## 2019-09-01 PROCEDURE — 36000053 ZZH SURGERY LEVEL 2 EA 15 ADDTL MIN - UMMC: Performed by: PEDIATRICS

## 2019-09-01 PROCEDURE — 25800030 ZZH RX IP 258 OP 636: Performed by: PEDIATRICS

## 2019-09-01 PROCEDURE — 83735 ASSAY OF MAGNESIUM: CPT

## 2019-09-01 PROCEDURE — 71045 X-RAY EXAM CHEST 1 VIEW: CPT

## 2019-09-01 PROCEDURE — 87340 HEPATITIS B SURFACE AG IA: CPT | Performed by: PEDIATRICS

## 2019-09-01 PROCEDURE — 40000611 ZZHCL STATISTIC MORPHOLOGY W/INTERP HEMEPATH TC 85060: Performed by: STUDENT IN AN ORGANIZED HEALTH CARE EDUCATION/TRAINING PROGRAM

## 2019-09-01 PROCEDURE — 25800030 ZZH RX IP 258 OP 636: Performed by: STUDENT IN AN ORGANIZED HEALTH CARE EDUCATION/TRAINING PROGRAM

## 2019-09-01 PROCEDURE — 88184 FLOWCYTOMETRY/ TC 1 MARKER: CPT | Performed by: STUDENT IN AN ORGANIZED HEALTH CARE EDUCATION/TRAINING PROGRAM

## 2019-09-01 PROCEDURE — 88271 CYTOGENETICS DNA PROBE: CPT | Performed by: STUDENT IN AN ORGANIZED HEALTH CARE EDUCATION/TRAINING PROGRAM

## 2019-09-01 PROCEDURE — 40000564 ZZHCL STATISTIC BONE MARROW CORE PERF TC ACL/CSC 38221: Performed by: STUDENT IN AN ORGANIZED HEALTH CARE EDUCATION/TRAINING PROGRAM

## 2019-09-01 PROCEDURE — 25000125 ZZHC RX 250: Performed by: PEDIATRICS

## 2019-09-01 PROCEDURE — 25000128 H RX IP 250 OP 636: Performed by: STUDENT IN AN ORGANIZED HEALTH CARE EDUCATION/TRAINING PROGRAM

## 2019-09-01 PROCEDURE — 88311 DECALCIFY TISSUE: CPT | Performed by: STUDENT IN AN ORGANIZED HEALTH CARE EDUCATION/TRAINING PROGRAM

## 2019-09-01 PROCEDURE — 27210794 ZZH OR GENERAL SUPPLY STERILE: Performed by: PEDIATRICS

## 2019-09-01 PROCEDURE — 85025 COMPLETE CBC W/AUTO DIFF WBC: CPT | Performed by: STUDENT IN AN ORGANIZED HEALTH CARE EDUCATION/TRAINING PROGRAM

## 2019-09-01 PROCEDURE — 88161 CYTOPATH SMEAR OTHER SOURCE: CPT | Performed by: STUDENT IN AN ORGANIZED HEALTH CARE EDUCATION/TRAINING PROGRAM

## 2019-09-01 PROCEDURE — 84100 ASSAY OF PHOSPHORUS: CPT | Performed by: PEDIATRICS

## 2019-09-01 PROCEDURE — 80069 RENAL FUNCTION PANEL: CPT | Performed by: PEDIATRICS

## 2019-09-01 PROCEDURE — 86803 HEPATITIS C AB TEST: CPT | Performed by: PEDIATRICS

## 2019-09-01 PROCEDURE — 88280 CHROMOSOME KARYOTYPE STUDY: CPT | Performed by: STUDENT IN AN ORGANIZED HEALTH CARE EDUCATION/TRAINING PROGRAM

## 2019-09-01 PROCEDURE — 88264 CHROMOSOME ANALYSIS 20-25: CPT | Performed by: STUDENT IN AN ORGANIZED HEALTH CARE EDUCATION/TRAINING PROGRAM

## 2019-09-01 PROCEDURE — 87389 HIV-1 AG W/HIV-1&-2 AB AG IA: CPT | Performed by: STUDENT IN AN ORGANIZED HEALTH CARE EDUCATION/TRAINING PROGRAM

## 2019-09-01 PROCEDURE — 25000125 ZZHC RX 250: Performed by: NURSE ANESTHETIST, CERTIFIED REGISTERED

## 2019-09-01 PROCEDURE — 88275 CYTOGENETICS 100-300: CPT | Performed by: STUDENT IN AN ORGANIZED HEALTH CARE EDUCATION/TRAINING PROGRAM

## 2019-09-01 PROCEDURE — 36000051 ZZH SURGERY LEVEL 2 1ST 30 MIN - UMMC: Performed by: PEDIATRICS

## 2019-09-01 PROCEDURE — 40000951 ZZHCL STATISTIC BONE MARROW INTERP TC 85097: Performed by: STUDENT IN AN ORGANIZED HEALTH CARE EDUCATION/TRAINING PROGRAM

## 2019-09-01 PROCEDURE — 86787 VARICELLA-ZOSTER ANTIBODY: CPT | Performed by: STUDENT IN AN ORGANIZED HEALTH CARE EDUCATION/TRAINING PROGRAM

## 2019-09-01 PROCEDURE — 88185 FLOWCYTOMETRY/TC ADD-ON: CPT | Performed by: STUDENT IN AN ORGANIZED HEALTH CARE EDUCATION/TRAINING PROGRAM

## 2019-09-01 PROCEDURE — 80048 BASIC METABOLIC PNL TOTAL CA: CPT | Performed by: PEDIATRICS

## 2019-09-01 PROCEDURE — 40000567 ZZHCL STATISTIC BONE MARROW ASP PERF TC ACL/CSC 38220: Performed by: STUDENT IN AN ORGANIZED HEALTH CARE EDUCATION/TRAINING PROGRAM

## 2019-09-01 PROCEDURE — 40000795 ZZHCL STATISTIC DNA PROCESS AND HOLD: Performed by: STUDENT IN AN ORGANIZED HEALTH CARE EDUCATION/TRAINING PROGRAM

## 2019-09-01 PROCEDURE — 00000161 ZZHCL STATISTIC H-SPHEME PROCESS B/S: Performed by: STUDENT IN AN ORGANIZED HEALTH CARE EDUCATION/TRAINING PROGRAM

## 2019-09-01 PROCEDURE — 80053 COMPREHEN METABOLIC PANEL: CPT

## 2019-09-01 PROCEDURE — 83735 ASSAY OF MAGNESIUM: CPT | Performed by: PEDIATRICS

## 2019-09-01 PROCEDURE — 37000008 ZZH ANESTHESIA TECHNICAL FEE, 1ST 30 MIN: Performed by: PEDIATRICS

## 2019-09-01 PROCEDURE — 37000009 ZZH ANESTHESIA TECHNICAL FEE, EACH ADDTL 15 MIN: Performed by: PEDIATRICS

## 2019-09-01 PROCEDURE — 88342 IMHCHEM/IMCYTCHM 1ST ANTB: CPT | Performed by: STUDENT IN AN ORGANIZED HEALTH CARE EDUCATION/TRAINING PROGRAM

## 2019-09-01 PROCEDURE — 84100 ASSAY OF PHOSPHORUS: CPT

## 2019-09-01 PROCEDURE — 07DR3ZX EXTRACTION OF ILIAC BONE MARROW, PERCUTANEOUS APPROACH, DIAGNOSTIC: ICD-10-PCS | Performed by: PEDIATRICS

## 2019-09-01 RX ORDER — EPINEPHRINE 1 MG/ML
0.3 INJECTION, SOLUTION, CONCENTRATE INTRAVENOUS EVERY 5 MIN PRN
Status: DISCONTINUED | OUTPATIENT
Start: 2019-09-01 | End: 2019-09-09 | Stop reason: HOSPADM

## 2019-09-01 RX ORDER — ALBUTEROL SULFATE 0.83 MG/ML
2.5 SOLUTION RESPIRATORY (INHALATION)
Status: DISCONTINUED | OUTPATIENT
Start: 2019-09-01 | End: 2019-09-09 | Stop reason: HOSPADM

## 2019-09-01 RX ORDER — KETAMINE HYDROCHLORIDE 10 MG/ML
INJECTION, SOLUTION INTRAMUSCULAR; INTRAVENOUS PRN
Status: DISCONTINUED | OUTPATIENT
Start: 2019-09-01 | End: 2019-09-01

## 2019-09-01 RX ORDER — SODIUM CHLORIDE 9 MG/ML
200 INJECTION, SOLUTION INTRAVENOUS CONTINUOUS PRN
Status: DISCONTINUED | OUTPATIENT
Start: 2019-09-01 | End: 2019-09-09 | Stop reason: HOSPADM

## 2019-09-01 RX ORDER — ALBUTEROL SULFATE 90 UG/1
1-2 AEROSOL, METERED RESPIRATORY (INHALATION)
Status: DISCONTINUED | OUTPATIENT
Start: 2019-09-01 | End: 2019-09-09 | Stop reason: HOSPADM

## 2019-09-01 RX ORDER — ONDANSETRON 2 MG/ML
8 INJECTION INTRAMUSCULAR; INTRAVENOUS EVERY 6 HOURS
Status: COMPLETED | OUTPATIENT
Start: 2019-09-01 | End: 2019-09-02

## 2019-09-01 RX ORDER — METHYLPREDNISOLONE SODIUM SUCCINATE 125 MG/2ML
125 INJECTION, POWDER, LYOPHILIZED, FOR SOLUTION INTRAMUSCULAR; INTRAVENOUS
Status: DISCONTINUED | OUTPATIENT
Start: 2019-09-01 | End: 2019-09-09 | Stop reason: HOSPADM

## 2019-09-01 RX ORDER — NALOXONE HYDROCHLORIDE 0.4 MG/ML
.1-.4 INJECTION, SOLUTION INTRAMUSCULAR; INTRAVENOUS; SUBCUTANEOUS
Status: ACTIVE | OUTPATIENT
Start: 2019-09-01 | End: 2019-09-02

## 2019-09-01 RX ORDER — DIPHENHYDRAMINE HYDROCHLORIDE 50 MG/ML
50 INJECTION INTRAMUSCULAR; INTRAVENOUS
Status: DISCONTINUED | OUTPATIENT
Start: 2019-09-01 | End: 2019-09-09 | Stop reason: HOSPADM

## 2019-09-01 RX ORDER — DIPHENHYDRAMINE HYDROCHLORIDE 50 MG/ML
25-50 INJECTION INTRAMUSCULAR; INTRAVENOUS EVERY 6 HOURS PRN
Status: DISCONTINUED | OUTPATIENT
Start: 2019-09-01 | End: 2019-09-09 | Stop reason: HOSPADM

## 2019-09-01 RX ORDER — FENTANYL CITRATE 50 UG/ML
25-50 INJECTION, SOLUTION INTRAMUSCULAR; INTRAVENOUS
Status: DISCONTINUED | OUTPATIENT
Start: 2019-09-01 | End: 2019-09-01

## 2019-09-01 RX ORDER — ONDANSETRON 2 MG/ML
8 INJECTION INTRAMUSCULAR; INTRAVENOUS EVERY 6 HOURS
Status: DISCONTINUED | OUTPATIENT
Start: 2019-09-08 | End: 2019-09-09 | Stop reason: HOSPADM

## 2019-09-01 RX ORDER — HEPARIN SODIUM,PORCINE 10 UNIT/ML
2-4 VIAL (ML) INTRAVENOUS EVERY 24 HOURS
Status: DISCONTINUED | OUTPATIENT
Start: 2019-09-01 | End: 2019-09-09 | Stop reason: HOSPADM

## 2019-09-01 RX ORDER — ONDANSETRON 4 MG/1
4 TABLET, ORALLY DISINTEGRATING ORAL EVERY 30 MIN PRN
Status: DISCONTINUED | OUTPATIENT
Start: 2019-09-01 | End: 2019-09-01

## 2019-09-01 RX ORDER — LORAZEPAM 0.5 MG/1
.5-2 TABLET ORAL EVERY 6 HOURS PRN
Status: DISCONTINUED | OUTPATIENT
Start: 2019-09-01 | End: 2019-09-09

## 2019-09-01 RX ORDER — FAMOTIDINE 20 MG/1
0.25 TABLET, FILM COATED ORAL 2 TIMES DAILY
Status: DISCONTINUED | OUTPATIENT
Start: 2019-09-01 | End: 2019-09-01

## 2019-09-01 RX ORDER — DEXAMETHASONE SODIUM PHOSPHATE 4 MG/ML
5 INJECTION, SOLUTION INTRA-ARTICULAR; INTRALESIONAL; INTRAMUSCULAR; INTRAVENOUS; SOFT TISSUE EVERY 12 HOURS
Status: DISCONTINUED | OUTPATIENT
Start: 2019-09-01 | End: 2019-09-05

## 2019-09-01 RX ORDER — LORAZEPAM 2 MG/ML
.5-2 INJECTION INTRAMUSCULAR EVERY 6 HOURS PRN
Status: DISCONTINUED | OUTPATIENT
Start: 2019-09-01 | End: 2019-09-09

## 2019-09-01 RX ORDER — ONDANSETRON 2 MG/ML
4 INJECTION INTRAMUSCULAR; INTRAVENOUS EVERY 30 MIN PRN
Status: DISCONTINUED | OUTPATIENT
Start: 2019-09-01 | End: 2019-09-01

## 2019-09-01 RX ORDER — SODIUM CHLORIDE, SODIUM LACTATE, POTASSIUM CHLORIDE, CALCIUM CHLORIDE 600; 310; 30; 20 MG/100ML; MG/100ML; MG/100ML; MG/100ML
INJECTION, SOLUTION INTRAVENOUS CONTINUOUS
Status: DISCONTINUED | OUTPATIENT
Start: 2019-09-01 | End: 2019-09-01

## 2019-09-01 RX ORDER — DIPHENHYDRAMINE HCL 25 MG
25-50 CAPSULE ORAL EVERY 6 HOURS PRN
Status: DISCONTINUED | OUTPATIENT
Start: 2019-09-01 | End: 2019-09-09

## 2019-09-01 RX ORDER — HEPARIN SODIUM,PORCINE 10 UNIT/ML
2-4 VIAL (ML) INTRAVENOUS
Status: DISCONTINUED | OUTPATIENT
Start: 2019-09-01 | End: 2019-09-09 | Stop reason: HOSPADM

## 2019-09-01 RX ADMIN — Medication 150 MG: at 08:00

## 2019-09-01 RX ADMIN — ACETAMINOPHEN 650 MG: 325 TABLET, FILM COATED ORAL at 14:39

## 2019-09-01 RX ADMIN — DEXAMETHASONE SODIUM PHOSPHATE 9.76 MG: 4 INJECTION, SOLUTION INTRAMUSCULAR; INTRAVENOUS at 20:02

## 2019-09-01 RX ADMIN — Medication 4 ML: at 00:50

## 2019-09-01 RX ADMIN — DEXMEDETOMIDINE HYDROCHLORIDE 8 MCG: 100 INJECTION, SOLUTION INTRAVENOUS at 13:04

## 2019-09-01 RX ADMIN — ACETAMINOPHEN 650 MG: 325 TABLET, FILM COATED ORAL at 08:00

## 2019-09-01 RX ADMIN — Medication 35 MG: at 13:01

## 2019-09-01 RX ADMIN — DEXMEDETOMIDINE HYDROCHLORIDE 4 MCG: 100 INJECTION, SOLUTION INTRAVENOUS at 13:06

## 2019-09-01 RX ADMIN — MIDAZOLAM 2 MG: 1 INJECTION INTRAMUSCULAR; INTRAVENOUS at 13:27

## 2019-09-01 RX ADMIN — DEXTROSE AND SODIUM CHLORIDE: 5; 900 INJECTION, SOLUTION INTRAVENOUS at 03:12

## 2019-09-01 RX ADMIN — DEXAMETHASONE SODIUM PHOSPHATE 9.76 MG: 4 INJECTION, SOLUTION INTRAMUSCULAR; INTRAVENOUS at 08:00

## 2019-09-01 RX ADMIN — ACETAMINOPHEN 650 MG: 325 TABLET, FILM COATED ORAL at 19:58

## 2019-09-01 RX ADMIN — Medication 15 MG: at 13:06

## 2019-09-01 RX ADMIN — DEXMEDETOMIDINE HYDROCHLORIDE 6 MCG: 100 INJECTION, SOLUTION INTRAVENOUS at 13:25

## 2019-09-01 RX ADMIN — Medication 3 MG: at 23:29

## 2019-09-01 RX ADMIN — Medication 150 MG: at 14:39

## 2019-09-01 RX ADMIN — ONDANSETRON 8 MG: 2 INJECTION INTRAMUSCULAR; INTRAVENOUS at 18:29

## 2019-09-01 RX ADMIN — MIDAZOLAM 1 MG: 1 INJECTION INTRAMUSCULAR; INTRAVENOUS at 13:01

## 2019-09-01 RX ADMIN — DEXMEDETOMIDINE HYDROCHLORIDE 8 MCG: 100 INJECTION, SOLUTION INTRAVENOUS at 13:02

## 2019-09-01 RX ADMIN — Medication 150 MG: at 19:58

## 2019-09-01 RX ADMIN — VINCRISTINE SULFATE 2 MG: 1 INJECTION, SOLUTION INTRAVENOUS at 19:04

## 2019-09-01 RX ADMIN — FAMOTIDINE 20 MG: 20 INJECTION, SOLUTION INTRAVENOUS at 08:10

## 2019-09-01 RX ADMIN — ACETAMINOPHEN 650 MG: 325 TABLET, FILM COATED ORAL at 01:57

## 2019-09-01 RX ADMIN — DEXTROSE AND SODIUM CHLORIDE: 5; 900 INJECTION, SOLUTION INTRAVENOUS at 10:53

## 2019-09-01 RX ADMIN — FAMOTIDINE 20 MG: 20 INJECTION, SOLUTION INTRAVENOUS at 20:43

## 2019-09-01 RX ADMIN — MIDAZOLAM 1 MG: 1 INJECTION INTRAMUSCULAR; INTRAVENOUS at 12:56

## 2019-09-01 RX ADMIN — DAUNORUBICIN HYDROCHLORIDE 49 MG: 5 INJECTION, SOLUTION INTRAVENOUS at 19:17

## 2019-09-01 NOTE — ANESTHESIA CARE TRANSFER NOTE
Patient: Lazaro Lund    Procedure(s):  LUMBAR PUNCTURE, WITH INTRATHECAL CHEMOTHERAPY ADMINISTRATION  INSERTION, PICC  Thoracentesis    Diagnosis: Lymphoma  Diagnosis Additional Information: No value filed.    Anesthesia Type:   MAC     Note:  Airway :Room Air  Patient transferred to:ICU  ICU Handoff: Call for PAUSE to initiate/utilize ICU HANDOFF, Identified Patient, Identified Responsible Provider, Reviewed the Pertinent Medical History, Discussed Surgical Course, Reviewed Intra-OP Anesthesia Management and Issues during Anesthesia, Set Expectations for Post Procedure Period and Allowed Opportunity for Questions and Acknowledgement of Understanding      Vitals: (Last set prior to Anesthesia Care Transfer)    CRNA VITALS  8/31/2019 1850 - 8/31/2019 1950      8/31/2019             Resp Rate (observed):  1  (Abnormal)                 Electronically Signed By: YOHAN Teran CRNA  August 31, 2019  9:24 PM

## 2019-09-01 NOTE — PLAN OF CARE
Lazaro had his Bone marrow biopsy today, has had no issues since. No reports of pain. His Hrs are 80s-100s, Bps /60-70. SATs are  on room air. He did need 2-4L when he first came [place]. He will start first round of chemo at 1900.

## 2019-09-01 NOTE — ANESTHESIA PROCEDURE NOTES
Spinal/LP Procedure Note    Spinal Block  Staff:     Anesthesiologist:  Joao Connor MD  Location: OR  Procedure Start/Stop Times:      8/31/2019 7:07 PM    patient identified, IV checked, site marked, risks and benefits discussed, informed consent, monitors and equipment checked, pre-op evaluation, at physician/surgeon's request and post-op pain management      Correct Patient: Yes      Correct Position: Yes      Correct Site: Yes      Correct Procedure: Yes      Correct Laterality:  Yes    Site Marked:  Yes  Procedure:     Procedure:  Intrathecal    ASA:  3    Diagnosis:  Malignancy    Position:  Left lateral decubitus    Sterile Prep: chloraprep      Insertion site:  L2-3    Approach:  Left paramedian    Needle Type:  Slava    Needle gauge (G):  25    Local Skin Infiltration:  1% lidocaine    amount (ml):  2    Needle Length (in):  3.5    Introducer used: Yes      Introducer gauge:  20 G    Attempts:  1    Redirects:  0    CSF:  Clear    Paresthesias:  No  Assessment/Narrative:      Spinal used for the quemotherapy injection

## 2019-09-01 NOTE — PROGRESS NOTES
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 Pt with new diagnosis of cancer and currently NPO. Per discussion with team, pt not appropriate for RD visit at this time, however will need RD intervention. Per discussion with medical team, plan for RD to visit with patient 9/3.     Karly Mendiola RD, LD  Weekend/On-call Pager: [phone]      "

## 2019-09-01 NOTE — ANESTHESIA POSTPROCEDURE EVALUATION
Anesthesia POST Procedure Evaluation    Patient: Lazaro Lund   MRN:     [id number] Gender:   male   Age:    19 year old :      2000        Preoperative Diagnosis: lymphoma   Procedure(s):  BIOPSY, BONE MARROW   Postop Comments: No value filed.       Anesthesia Type:  Not documented  MAC    Reportable Event: NO     PAIN: Uncomplicated   Sign Out status: Comfortable, Well controlled pain     PONV: No PONV   Sign Out status:  No Nausea or Vomiting     Neuro/Psych: Uneventful perioperative course   Sign Out Status: Preoperative baseline; Age appropriate mentation     Airway/Resp.: Uneventful perioperative course   Sign Out Status: Non labored breathing, age appropriate RR; Resp. Status within EXPECTED Parameters     CV: Uneventful perioperative course   Sign Out status: Appropriate BP and perfusion indices; Appropriate HR/Rhythm     Disposition:   Sign Out in:  ICU  Disposition:  ICU  Recovery Course: Recovery in ICU  Follow-Up: Not required           Last Anesthesia Record Vitals:  CRNA VITALS  2019 1306 - 2019 1406      2019             SpO2:  100 %    Resp Rate (observed):  20    EKG:  Sinus rhythm          Last PACU Vitals:  Vitals Value Taken Time   /77 2019  5:00 PM   Temp     Pulse 96 2019  5:00 PM   Resp     SpO2 100 % 2019  5:06 PM   Temp src     NIBP     Pulse     SpO2     Resp     Temp     Ht Rate     Temp 2     Vitals shown include unvalidated device data.      Electronically Signed By: Henok Kaba MD, 2019, 5:07 PM

## 2019-09-01 NOTE — ANESTHESIA POSTPROCEDURE EVALUATION
Anesthesia POST Procedure Evaluation    Patient: Lazaro Lund   MRN:     [id number] Gender:   male   Age:    19 year old :      2000        Preoperative Diagnosis: Lymphoma   Procedure(s):  LUMBAR PUNCTURE, WITH INTRATHECAL CHEMOTHERAPY ADMINISTRATION  INSERTION, PICC  Thoracentesis   Postop Comments: No value filed.       Anesthesia Type:  Not documented  MAC    Reportable Event: NO     PAIN: Uncomplicated   Sign Out status: Comfortable, Well controlled pain     PONV: No PONV   Sign Out status:  No Nausea or Vomiting     Neuro/Psych: Uneventful perioperative course   Sign Out Status: Preoperative baseline; Age appropriate mentation     Airway/Resp.: Uneventful perioperative course   Sign Out Status: Non labored breathing, age appropriate RR; Resp. Status within EXPECTED Parameters     CV: Uneventful perioperative course   Sign Out status: Appropriate BP and perfusion indices; Appropriate HR/Rhythm     Disposition:   Sign Out in:  PACU  Disposition:  Phase II; Home  Recovery Course: Uneventful  Follow-Up: Not required           Last Anesthesia Record Vitals:  CRNA VITALS  2019 1927 - 2019 1957      2019             Resp Rate (observed):  2  (Abnormal)           Last PACU Vitals:  Vitals Value Taken Time   BP     Temp     Pulse     Resp 33 2019  7:57 PM   SpO2 97 % 2019  7:57 PM   Temp src     NIBP     Pulse     SpO2     Resp     Temp     Ht Rate     Temp 2     Vitals shown include unvalidated device data.      Electronically Signed By: Bernabe Chung MD, 2019, 7:57 PM

## 2019-09-01 NOTE — PROGRESS NOTES
[facility], [place]    Pediatric Intensive Care Transfer Accept Note  Date of Service (when I saw the patient): 09/01/2019     Assessment & Plan   Lazaro Lund is a 19 year old male with acute onset cough recently found to have anterior mediastinal mass and malignant left-sided pleural effusion concerning for lymphoma versus leukemia. 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 He was admitted to [facility] oncology service yesterday, and underwent LP with intrathecal chemotherapy, PICC placement, and chest tube placement this evening 8/31. He is transferring to the ICU for close monitoring of respiratory status and tumor lysis labs.     HEME/ONC  1. 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 Pericardial effusion  2. Tamponade  3. Orthopnea  -Cardiology consulted  -Repeat echo on 9/1; some shift in compression from anterior mediastinal mass now that pleural effusion is drained, but overall normal EF and function  -Do NOT lay flat, head of bed > 60 degrees at all times  -Adult telemetry    FEN  1. At risk for tumor lysis syndrome  -BMP, mag, phos, uric acid q6h  -Daily albumnin  -Allopurinol 150 mg TID  -s/p Rasburicase 6 mg 8/31  -D5 NS @ 170/hr (1.5 MIVF); do not add K+  -NPO    RESP  1. Left pleural effusion, s/p chest tube placement POD 0  -Oxygen NC wean as tolerated  -Chest tube to suction -20    GI  - IV famotidine 20 mg BID for gut protection while on steroids    ENT  1. Dysphagia  -NPO, except some meds    NEURO  -Benadryl q6h IV PRN  -Melatonin 3 mg at bedtime PRN  -Scheduled Tylenol 650 mg PO q6h scheduled      Access: PICC  Dispo: >7 days given work up and stabilization of respiratory status.    Patient seen and discussed with pediatric attending, Dr. Trevizo.    Lew Patterson MD  N Pediatrics PGY-2    Pediatric Critical Care Attestation:     Patient is admitted to the ICU and is receiving hospital level cares for mediastinal mass with t-cell lymphoma and orthopnea when he lays flat. Also has some cardiac impingement of tumor. Hemodynamically and respiratory stable but is at high risk for acute event while tumor is impinging on airway and cardiac structures.   I personally examined and evaluated the patient today, and have discussed plans with the resident/NP/fellow and nurse. All physician orders and treatments were placed at my direction and agree with the findings and plan of care as documented in the note  Patient's weight today: 165 lbs 12.57 oz    Treatment plan today is: continue steroids, got LP chemo yesterday, getting bone marrow biopsy in OR today, will continue tumor lysis labs.     The above plans and care have been discussed with Lazaro and heme onc.  I spent a total of  40  minutes providing hospital care at the bedside and on the unit, evaluating the patient, directing care and reviewing laboratory values and radiologic reports for this patient.    Amador Trevizo MD   PICU Attending      Interval History   Overnight required a few hours of 4 L via nasal cannula, but on room air later this morning. Continued stridor on exam overnight. All vitals stable. Pain is well controlled on scheduled Tylenol this morning.     Physical Exam   Temp: 97.7  F (36.5  C) Temp src: Axillary BP: 96/64 Pulse: 78 Heart Rate: 85 Resp: 18 SpO2: 96 % O2 Device: None (Room air) Oxygen Delivery: 4 LPM  Vitals:    08/30/19 1523 08/30/19 1811   Weight: 75.5 kg (166 lb 7.2 oz) 75.2 kg (165 lb 12.6 oz)     Vital Signs with Ranges  Temp:  [96.8  F (36  C)-98.6  F (37  C)] 97.7  F (36.5  C)  Pulse:  [] 78  Heart Rate:   [] 85  Resp:  [17-26] 18  BP: ()/(63-97) 96/64  FiO2 (%):  [21 %] 21 %  SpO2:  [92 %-99 %] 96 %  I/O last 3 completed shifts:  In: 3556.35 [P.O.:75; I.V.:3481.35]  Out: 3903 [Urine:1200; Chest Tube:2703]    PE:  Appearance: Alert and appropriate, well developed, nontoxic.  HEENT: Head: Atraumatic.   Eyes: PERRL, EOM grossly intact, conjunctivae and sclerae clear. Right eye opens wider than left.    Ears: Tympanic membranes clear bilaterally, without inflammation or effusion.   Nose: Nares clear with no active discharge.    Mouth/Throat: Moist mucous membranes. No oral lesions, pharynx clear with no erythema or exudate. Right side mouth droop.   Respiratory: Mild increased work of breathing. Moderate air movement bilaterally. Prominent expiratory stridor bilaterally, intermittent inspiratory stridor. No rales, rhonchi, or wheezing.  Cardiovascular: Regular rate and rhythm, normal S1 and S2, with no murmurs. Heart sounds distant. Normal symmetric peripheral pulses and brisk cap refill.   Abdominal:  Soft, nontender, non-distended. No masses, but in sitting position, so hard to assess.  Neurologic: Alert and oriented, right side of mouth is drooping, left normal. Right eye wider than left.   Extremities/Back: No deformity.  Skin: No significant rashes, ecchymoses, or lacerations.     Medications     dextrose 5% and 0.9% NaCl 170 mL/hr at 09/01/19 1053       acetaminophen  650 mg Oral Q6H     allopurinol  150 mg Oral TID     INTRATHECAL chemo - Cytarabine and/or methotrexate and/or Hydrocortisone   Intrathecal Once     dexamethasone  5 mg/m2 Intravenous Q12H     famotidine  20 mg Intravenous Q12H     heparin lock flush  2-4 mL Intracatheter Q24H     heparin lock flush  2-4 mL Intracatheter Q24H     sodium chloride (PF)  3 mL Intracatheter Q8H       Data   Results for orders placed or performed during the hospital encounter of 08/30/19 (from the past 24 hour(s))   Glucose by meter   Result Value Ref Range     Glucose 80 70 - 99 mg/dL   Cell count with differential CSF:   Result Value Ref Range    WBC CSF 0 0 - 5 /uL    RBC CSF 0 0 - 2 /uL    Tube Number 2 #    Color CSF Colorless CLRL^Colorless    Appearance CSF Clear CLER^Clear   Glucose CSF:   Result Value Ref Range    Glucose CSF 60 40 - 70 mg/dL   Protein total CSF:   Result Value Ref Range    Protein Total CSF 20 15 - 60 mg/dL   IR Chest Tube Place Non Tunneled Left    Narrative    Procedures: Ultrasound-guided left chest tube placement, 8/31/2019.    Clinical indication: Recently diagnosed leukemia. Symptomatic left  pleural effusion.    Comparison studies: 8/30/2019.    PROCEDURE:        Interventional radiologists: Michell Escamilla MD (IR staff)    Fellow: Opal Isbell MD    Consent: verbal and written informed consent obtained prior to  procedure.    Procedure details: Patient placed in the seated position. The left  chest was prepped and draped in standard sterile fashion.  Preprocedural ultrasound documented anechoic left pleural effusion  with adequate percutaneous window for drainage. Timeout performed. 1%  lidocaine was used to anesthetize the dermis and proposed needle  tract. Using ultrasound guidance, a Qvanteq catheter was advanced into  the pleural space. Image saved in the patient's permanent medical  record. The catheter was advanced off of the stylette on the stylette  removed. A [employer] wire was advanced through the catheter into the  pleural space. The catheter was removed over the wire. The tract was  dilated, followed by placement of 8 Norwegian Flexima catheter. The inner  dilator and wire were removed. The tube was attached to chest tube  chamber apparatus. Approximately 1.8 L of serous fluid were drained in  the next several minutes. The catheter was secured to the skin using a  2-0 Ethilon suture. No immediate complication.     Medications: 1% lidocaine.     Monitoring: The patient was placed on continuous monitoring. Vital  signs and sedation  monitored by anesthesiology staff. The patient  remained stable throughout the procedure.    Sedation time: Not applicable    Complications: None.      Impression    IMPRESSION:     Placement of 8 Libyan Flexima catheter into the left pleural space.  1.8 L of serous fluid drained.    PLAN:    Chest tube to -20 cm water suction.     Cell count with differential fluid   Result Value Ref Range    Body Fluid Analysis Source Pleural fluid     % Neutrophils Fluid 1 %    % Lymphocytes Fluid 73 %    % Mono/Macro Fluid 25 %    % Eosinophils Fluid 1 %    Color Fluid Yellow     Appearance Fluid Cloudy     WBC Fluid 37783 /uL   IR PICC Placement > 5 Yrs of Age    Narrative    This exam was marked as non-reportable because it will not be read by a   radiologist or a [place] non-radiologist provider.             XR Surgery AUTUMN L/T 5 Min Fluoro w Stills    Narrative    Procedures: Image-guided right upper extremity PICC line placement,  8/31/2019    Clinical indication: 19-year-old with recently diagnosed leukemia.  Need for central venous access for chemotherapy administration.    Comparison studies: 8/30/2019.    PROCEDURE:        Interventional radiologists: Michell Escamilla MD (IR staff)    Fellow: Opal Isbell MD    Consent: verbal and written informed consent obtained prior to  procedure.    Procedure details: Patient placed in the seated position. The right  upper extremity was prepped and draped in standard sterile fashion  after preprocedural ultrasound with tourniquet demonstrated patent  superficial veins, including the basilic vein. Timeout performed. 1%  lidocaine was used to anesthetize the dermis and proposed needle tract  adjacent to the basilic vein. Using ultrasound guidance, a 21-gauge  micropuncture needle was advanced into the basilic vein. Image saved  in the patient's chart. A microguidewire was advanced into the basilic  vein. The dilator with peel-away sheath advanced over the wire. The  C-arm was then  used to identify the needle as it was advanced into the  mid SVC. A clamp was used to measure the length of the catheter. The  wire was then removed and the sheath locked. The 4 Sammarinese double lumen  PICC line was then cut to length and flushed with saline. It was  advanced over the peel-away sheath, which was subsequently removed.  Fluoroscopy confirmed positioning within the mid SVC. The catheter was  secured to the skin using 3-0 Ethilon suture. A sterile dressing was  placed. No immediate complication.     Medications: Per anesthesiology team. 1% lidocaine without epinephrine  was used for local anesthesia.    Monitoring: The patient was placed on continuous monitoring. Vital  signs and sedation monitored by nursing staff under my supervision.  The patient remained stable throughout the procedure.    Sedation time: Not applicable.    Fluoroscopy time: 0.2 minutes    Complications: None.      Impression    IMPRESSION:     Placement of 4 Sammarinese, dual-lumen PICC line via the right basilic  vein. The tip is positioned within the mid SVC.    PLAN:    Line available for immediate use.     Methicillin Resistant Staph Aureus PCR   Result Value Ref Range    Specimen Description Nares     Methicillin Resist/Sens S. aureus PCR Negative NEG^Negative   XR Chest Port 1 View    Narrative    XR CHEST PORT 1 VW  8/31/2019 8:17 PM      HISTORY: Follow up after drainage of pleural effusion    COMPARISON: Previous day    FINDINGS:   Portable upright view of the chest. Right arm PICC tip projects over  the high SVC. There is a new small left basilar pleural catheter  partially visualized. Decrease in left-sided pleural effusion. New  tiny left pneumothorax.    The cardiac silhouette size is not enlarged. Mediastinal mass  redemonstrated. Mild patchy left basilar opacities are decreased.      Impression    IMPRESSION:   Decreased pleural fluid with new tiny left pneumothorax following  pleural catheter placement.    FATOUMATA PINK MD    Basic metabolic panel   Result Value Ref Range    Sodium 141 133 - 144 mmol/L    Potassium 3.3 (L) 3.4 - 5.3 mmol/L    Chloride 107 94 - 109 mmol/L    Carbon Dioxide 25 20 - 32 mmol/L    Anion Gap 9 3 - 14 mmol/L    Glucose 111 (H) 70 - 99 mg/dL    Urea Nitrogen 3 (L) 7 - 30 mg/dL    Creatinine 0.58 (L) 0.66 - 1.25 mg/dL    GFR Estimate >90 >60 mL/min/[1.73_m2]    GFR Estimate If Black >90 >60 mL/min/[1.73_m2]    Calcium 7.7 (L) 8.5 - 10.1 mg/dL   Phosphorus   Result Value Ref Range    Phosphorus 3.8 2.5 - 4.5 mg/dL   Magnesium   Result Value Ref Range    Magnesium 1.8 1.6 - 2.3 mg/dL   Uric acid   Result Value Ref Range    Uric Acid 3.0 (L) 3.5 - 7.2 mg/dL   Uric acid   Result Value Ref Range    Uric Acid <0.2 (L) 3.5 - 7.2 mg/dL   Albumin level   Result Value Ref Range    Albumin 2.7 (L) 3.4 - 5.0 g/dL   Magnesium   Result Value Ref Range    Magnesium 2.2 1.6 - 2.3 mg/dL   Phosphorus   Result Value Ref Range    Phosphorus 4.1 2.5 - 4.5 mg/dL   Basic metabolic panel   Result Value Ref Range    Sodium 140 133 - 144 mmol/L    Potassium 4.0 3.4 - 5.3 mmol/L    Chloride 109 94 - 109 mmol/L    Carbon Dioxide 25 20 - 32 mmol/L    Anion Gap 6 3 - 14 mmol/L    Glucose 181 (H) 70 - 99 mg/dL    Urea Nitrogen 6 (L) 7 - 30 mg/dL    Creatinine 0.59 (L) 0.66 - 1.25 mg/dL    GFR Estimate >90 >60 mL/min/[1.73_m2]    GFR Estimate If Black >90 >60 mL/min/[1.73_m2]    Calcium 8.0 (L) 8.5 - 10.1 mg/dL   XR Chest Port 1 View    Narrative    XR CHEST PORT 1 VW  9/1/2019 6:58 AM      HISTORY: Monitor mediastinal mass, pleural effusion with chest tube in  place    COMPARISON: Previous day    FINDINGS:   Portable upright view of the chest. Right arm PICC tip projects over  the high SVC. Left basilar chest tube is stable in position. No  significant pneumothorax. Trace bilateral pleural effusions. The  cardiac silhouette size is stable. Mediastinal mass is grossly  unchanged from yesterday's examination.      Impression    IMPRESSION:    Trace amount of pleural fluid bilaterally, left basilar chest tube  stable in position.    FATOUMATA PINK MD   US Abdomen Complete Portable    Narrative    EXAMINATION: US ABDOMEN COMPLETE PORTABLE  9/1/2019 7:53 AM      CLINICAL HISTORY: evaluate for hepatosplenomegaly and lymphadenopathy  in newly diagnosed lymphoma patient    COMPARISON: None available.        FINDINGS:  The liver is normal in contour and echogenicity. The liver measures  16.3 cm in craniocaudal dimension. There is no intrahepatic or  extrahepatic biliary ductal dilatation. The common bile duct measures  3 mm. The gallbladder is normal, without gallstones, wall thickening,  or pericholecystic fluid.    The spleen measures maximally 11.6 cm and is normal in appearance. The  visualized portions of the pancreas are normal in echogenicity.    The visualized upper abdominal aorta and inferior vena cava are  normal.      The kidneys are normal in position and demonstrate borderline  increased echogenicity. The right kidney measures 14.2 cm and the left  kidney measures 12.8 cm. There is no significant urinary tract  dilation. The urinary bladder is moderately distended with significant  echogenic debris. The bladder wall is normal.    There is a small right pleural effusion.      Impression    IMPRESSION:   1. Liver size at the upper limits of normal. No splenomegaly.  2. Borderline echogenic renal parenchyma, which can be seen with  medical renal disease. Asymmetric nephromegaly on the right.  3. Significant amount of debris within the bladder. Recommend  correlation with urinalysis.  4. Small right pleural effusion.    I have personally reviewed the examination and initial interpretation  and I agree with the findings.    FATOUMATA PINK MD   Uric acid   Result Value Ref Range    Uric Acid 0.2 (L) 3.5 - 7.2 mg/dL   Magnesium   Result Value Ref Range    Magnesium 2.2 1.6 - 2.3 mg/dL   Phosphorus   Result Value Ref Range    Phosphorus 4.6 (H) 2.5 - 4.5  mg/dL   Basic metabolic panel   Result Value Ref Range    Sodium 142 133 - 144 mmol/L    Potassium 3.9 3.4 - 5.3 mmol/L    Chloride 112 (H) 94 - 109 mmol/L    Carbon Dioxide 23 20 - 32 mmol/L    Anion Gap 7 3 - 14 mmol/L    Glucose 141 (H) 70 - 99 mg/dL    Urea Nitrogen 9 7 - 30 mg/dL    Creatinine 0.71 0.66 - 1.25 mg/dL    GFR Estimate >90 >60 mL/min/[1.73_m2]    GFR Estimate If Black >90 >60 mL/min/[1.73_m2]    Calcium 8.0 (L) 8.5 - 10.1 mg/dL

## 2019-09-01 NOTE — INTERIM SUMMARY
Name:Lazaro Lund  MRN: [id number]  : 2000  Room: [address] [place] [address]    [place]iner: 19 year old male with acute onset cough recently found to have anterior mediastinal mass and malignant left-sided pleural effusion concerning for lymphoma versus leukemia. 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 He was admitted to peds oncology service yesterday, and underwent LP with intrathecal chemotherapy, PICC placement, and chest tube placement . He is transferring to the ICU for close monitoring of respiratory status and tumor lysis labs.    Consults: Heme/onc Interval Events:  - CXR stable  - echo showed IMPROVING gradients  - soft diet  - decadron only   - started bactrim ppx /  - SLP bedside eval  - IVFs to maintenance   - Chest tube to water seal    To Do:   [ ]q6h 1630, 2230, 0430 lytes, mag, phos, uric acid, iCal  [ ]watch for tumor lysis    Situational:   -Large anterior mediastinal mass, also with mild tamponade physiology, vital signs changes should be taken seriously      FEN:  Last 24: Intake  Output  Post MN: Intake  Output  Lines/Tubes:   Wt:      Yest Wt:      Calc Wt: Total in:  IVF:  TPN/IL:  PO:  NG/GT:  pRBC:  PLT:    TFI ml/kg/day:   __________  __________  __________  __________  __________  __________  __________    __________ Total out:  Urine:  NG/emesis:  Stool:  Drain:  Blood:  Mix:    UOP ml/kg/hr:  NET: __________  __________  __________  __________  __________  __________  __________    __________  __________  Total in:  IVF:  TPN/IL:  PO:  NG/GT:  pRBC:  PLT:   __________  __________  __________  __________  __________  __________  __________   Total out:  Urine:  NG/emesis:  Stool:  Drain:  Blood:  Mix:    UOP ml/kg/hr:  NET: __________  __________  __________  __________  __________  __________  __________    __________  __________         VITALS/LABS/RESULTS MEDICATIONS/TREATMENTS ASSESSMENT/PLAN   FEN/  RENAL continued                                                      _______________/                                               \                    Ca:  Mg:  Phos:                                                   Alb:       T protein:     iCa:                 At risk for tumor lysis syndrome     -Allopurinol 150 mg TID  -Rasburicase 6 mg once tonight   -D5 NS @ 170/hr (1.5 MIVF); do not add K+  -Soft diet    [  ] SLP bedside eval 9/3      -BMP, mag, phos, uric acid, iCal q6h  -Daily albumnin  -Discuss IgG in future     Goal TLS labs:      PO4<6.5     Ca>7     iCal>3.2     K<6   RESP: RR:__________   SaO2:__________ on _______%O2   Left pleural effusion, s/p chest tube placement POD 1  -Oxygen NC wean as tolerated  -Chest tube to suction WATER SEAL (9/3) CXR every day, PRN after 9/4     CV: HR:                           SBP:  CVP:                         DBP:                                SVO2:                       MAP:  Lactate:       9/1 9/2 9/3   RPA Mean 6, peak 14 mean 8, peak  23 Mean 5, peak 14   LPA Peak 13 peak 12 Peak 8   LA 3 4-6 2   SVC Mean 5, peak of 10 7 with moderate turbulence 1-2      Pericardial effusion, tamponade, orthopnea  -Cardiology consulted  -Repeat echo on 9/1; currently effusion too small for pericardiocentesis  -Do NOT lay flat, head of bed > 60 degrees at all times  -Adult telemetry Echo every day  Fluid goal +500 to 1L to maintain adequate CVP    [  ] touch base with cards regarding goal RV output needed to sustain him    HEME/  ONC:           \____/                               /        \                          INR:______    PTT:______                                Xa:_______  Fibr:______ T-cell lymphoma w/ anterior mediastinal mass  -Dexamethasone 9.75 mg IV q12h   -Heme/onc following -BM biopsy 9/1    -Abdominal us in AM 9/1 (have not assessed tumor burden below diaphragm)  - Daunorubicin and vincristine tonight (cards gave the OK per echo w/ normal function today)  - Peg 9/4:  anticipate additional tumor lysis    ID:    Tmax:      ____ Culture Date Results                         Treatment Start Stop To Cover                               CRP:  Procal:         GI  & ENT T Bili:             D Bili:  ALT:             AST:            AP: -IV famotidine 20 mg BID  -Miralax every day  -Colace BID  -Soft Diet   -SLP bedside eval     ENDO:          Neuro:          -Benadryl q6h IV PRN  -Melatonin 3 mg at bedtime PRN  -Scheduled Tylenol 650 mg PO q6h scheduled     ***

## 2019-09-01 NOTE — PLAN OF CARE
Patient arrived on unit at 1930 from the OR after having a chest tube and PICC placed as well as intrathecal chemo through LP. Patient was on RA with stable VS. Required NC up to 4L due to sats 88-92%. Back to RA at 0000. After falling asleep patient exhibited an increase in WOB including deep labored breath and occasional stridor. Resolved spontaneously and with patient repositioning self. Chest tube put out 350 mls this shift of serous fluid with some clots of blood. Patient voided after returning from OR but has declined use of urinal since. Slept well overnight and maintained stable VS and labs. Mother roomed-in with patient, both are updated on current POC and have been compliant with all interventions.

## 2019-09-01 NOTE — ANESTHESIA PREPROCEDURE EVALUATION
Anesthesia Pre-Procedure Evaluation    Patient: Lazaro Lund   MRN:     [id number] Gender:   male   Age:    19 year old :      2000        Preoperative Diagnosis: lymphoma   Procedure(s):  BIOPSY, BONE MARROW     Past Medical History:   Diagnosis Date     Migraine 2006    have resolved      Past Surgical History:   Procedure Laterality Date     IR PICC PLACEMENT > 5 YRS OF AGE  2019          Anesthesia Evaluation     . Pt has not had prior anesthetic            ROS/MED HX    ENT/Pulmonary: Comment: SOB. Worst with the recumbent position, had Chest tube placed yesterday with removal of fluid and improvement in breathing.  On my exam the patient was asleep in bed on his right side satting 99% on RA.  States his breathing feels pretty much normal when on his right side      Neurologic:  - neg neurologic ROS     Cardiovascular: Comment: Anterior mediastinal mass encasing pulmonary arteries bilaterally but not obstruction to flow on TTE.  Does have moderate pericardial effusion but not large enough to drain per report        METS/Exercise Tolerance:     Hematologic:  - neg hematologic  ROS       Musculoskeletal:  - neg musculoskeletal ROS       GI/Hepatic:  - neg GI/hepatic ROS       Renal/Genitourinary:  - ROS Renal section negative       Endo:  - neg endo ROS       Psychiatric:         Infectious Disease:         Malignancy:   (+) Malignancy History of Lymphoma/Leukemia  Upper anterior mediastinal mass. Cardiac tamponade physiology released by pleural effusion and pericardiocentesis procedure        Other:                         PHYSICAL EXAM:   Mental Status/Neuro: A/A/O   Airway: Facies: Feasible  Mallampati: I  Mouth/Opening: Full  TM distance: > 6 cm  Neck ROM: Full   Respiratory: Auscultation: CTAB     Resp. Rate: Normal     Resp. Effort: Normal      CV: Rhythm: Regular  Rate: Age appropriate  Heart: Normal Sounds  Edema: None   Comments:      Dental: Normal Dentition                LABS:  CBC:  "  Lab Results   Component Value Date    WBC 5.2 08/30/2019    HGB 13.4 08/30/2019    HCT 40.5 08/30/2019     08/30/2019     BMP:   Lab Results   Component Value Date     09/01/2019     08/31/2019    POTASSIUM 4.0 09/01/2019    POTASSIUM 3.3 (L) 08/31/2019    CHLORIDE 109 09/01/2019    CHLORIDE 107 08/31/2019    CO2 25 09/01/2019    CO2 25 08/31/2019    BUN 6 (L) 09/01/2019    BUN 3 (L) 08/31/2019    CR 0.59 (L) 09/01/2019    CR 0.58 (L) 08/31/2019     (H) 09/01/2019     (H) 08/31/2019     COAGS:   Lab Results   Component Value Date    PTT 33 08/30/2019    INR 1.17 (H) 08/30/2019    FIBR 458 (H) 08/30/2019     POC:   Lab Results   Component Value Date    BGM 80 08/31/2019     OTHER:   Lab Results   Component Value Date    MICHAEL 8.0 (L) 09/01/2019    PHOS 4.1 09/01/2019    MAG 2.2 09/01/2019    ALBUMIN 2.7 (L) 09/01/2019    PROTTOTAL 6.4 (L) 08/30/2019    ALT 18 08/30/2019    AST 21 08/30/2019    ALKPHOS 92 08/30/2019    BILITOTAL 0.5 08/30/2019        Preop Vitals    BP Readings from Last 3 Encounters:   09/01/19 105/67   08/22/13 100/66 (9 %/ 47 %)*   08/19/10 (!) 88/56 (3 %/ 29 %)*     *BP percentiles are based on the August 2017 AAP Clinical Practice Guideline for boys    Pulse Readings from Last 3 Encounters:   09/01/19 88   08/22/13 93   08/19/10 56      Resp Readings from Last 3 Encounters:   09/01/19 26   08/22/13 20   08/19/10 24    SpO2 Readings from Last 3 Encounters:   09/01/19 95%   08/22/13 98%      Temp Readings from Last 1 Encounters:   09/01/19 36.3  C (97.4  F) (Axillary)    Ht Readings from Last 1 Encounters:   08/30/19 1.825 m (5' 11.85\")      Wt Readings from Last 1 Encounters:   08/30/19 75.2 kg (165 lb 12.6 oz)    Estimated body mass index is 22.58 kg/m  as calculated from the following:    Height as of this encounter: 1.825 m (5' 11.85\").    Weight as of this encounter: 75.2 kg (165 lb 12.6 oz).     LDA:  Peripheral IV 08/30/19 Left Lower forearm (Active)   Site " Assessment WD 9/1/2019  8:00 AM   Line Status Saline locked 9/1/2019  8:00 AM   Phlebitis Scale 0-->no symptoms 9/1/2019  8:00 AM   Infiltration Scale 0 9/1/2019  8:00 AM   Extravasation? No 9/1/2019  8:00 AM   Number of days: 2       PICC Double Lumen 08/31/19 Right Basilic (Active)   Site Assessment [facility] 9/1/2019  8:00 AM   External Cath Length (cm) 0 cm 8/30/2019 12:00 AM   Extremity Circumference (cm) 27 cm 8/30/2019 12:00 AM   Dressing Intervention Chlorhexidine patch 9/1/2019  8:00 AM   Dressing Change Due 09/07/19 8/31/2019  8:00 PM   Lumen A - Color RED 9/1/2019  8:00 AM   Lumen A - Status blood return noted 9/1/2019  8:00 AM   Lumen A - Cap Change Due 09/04/19 9/1/2019  8:00 AM   Lumen B - Color WHITE 9/1/2019  8:00 AM   Lumen B - Status infusing 9/1/2019  8:00 AM   Lumen B - Cap Change Due 09/04/19 9/1/2019  8:00 AM   Number of days: 1       Chest Tube 1 Left  8 Divehi (Active)   Site Assessment WDL 9/1/2019 10:00 AM   Suction -20 cm H2O 9/1/2019 10:00 AM   Chest Tube Airleak No 9/1/2019 10:00 AM   Drainage Description Serous 9/1/2019 10:00 AM   Dressing Status Drainage - Minimal 9/1/2019 10:00 AM   Dressing Intervention Gauze 9/1/2019 10:00 AM   Patency Intervention Tip/Tilt 9/1/2019 10:00 AM   Chest Tube Clamps at Bedside present 9/1/2019 10:00 AM   Container Amount 550 9/1/2019 10:00 AM   Output (ml) 50 ml 9/1/2019 10:00 AM   Number of days: 1        Assessment:   ASA SCORE: 4    H&P: History and physical reviewed and following examination; no interval change.    NPO Status: NPO Appropriate     Plan:   Anes. Type:  MAC      Induction:  N/a   Airway: Native Airway   Access/Monitoring: PIV   Maintenance: N/a (Plan for ketamine and versed for sedation, procedural team states they would like to minimize sedation and use local anesthetic)     Postop Plan:   Postop Pain: None  Postop Sedation/Airway: Not planned  Disposition: ICU     CONSENT: Direct conversation   Plan and risks discussed with: Patient;  Mother   Blood Products: Consented (ALL Blood Products)                   Henok Kaba MD

## 2019-09-01 NOTE — DISCHARGE SUMMARY
[facility], [place]    Discharge Summary  Pediatric Hematology Oncology Admission    Date of Admission:  8/30/2019  Date of Discharge:  9/9/2019  Discharging Provider: Dr. Hayes  Date of Service (when I saw the patient): 09/09/19    Discharge Diagnoses   Patient Active Problem List   Diagnosis     Lymphoma (H)       History of Present Illness   Lazaro Lund is a 19 year old male admitted on 8/30/2019. He presents with 3 months of cough followed by adenopathy, found to have mediastinal mass and pleural effusion with neoplastic T cells. He was subsequently identified as having tamponade physiology and was admitted for further evaluation and management.    Hospital Course   Lazaro Lund was admitted on 8/30/2019.  The following problems were addressed during his hospitalization:    ONCOLOGY  On day 2 of admission (8/31), Lazaro had a left sided chest tube placed to drain his pleural effusion. He also had a PICC placed, and received a lumbar puncture with intrathecal cytarabine. He was started on high dose dexamethasone 9.75 mg twice daily. 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 He completed 8 days of chemotherapy with daunorubicin, vincristine (day 1 and 8), methotrexate (day 8), and pegasparginase (day 4).    CARDIOLOGY  Cardiology was consulted for tamponade physiology. Head of bed was elevated >60 degrees. He was monitored with serial echos. He remained asymptomatic without tachycardia or hypotension throughout his hospital stay.    RESPIRATORY  He required intermittent oxygen via nasal cannula overnight, and had persistent inspiratory and expiratory stridor with no increase in work of breathing. He was weaned to room air with resolution of cough and shortness of breath for the duration of his hospital stay.    FEN  He received rasburicase on 8/31 and was monitored closely for tumor lysis syndrome. He was treated with  allopurinol, which was discontinued prior to discharge.    GASTROENTEROLOGY  He was started on IV famotidine for GI protection while on high dose steroids. Nausea secondary to chemotherapy was well controlled with scheduled zofran and PRN benadryl and ativan. He received a bowel regimen of miralax and senna. He initially had dysphagia that resolved. He was cleared by speech to resume a regular diet, which he tolerated at time of discharge.    ID  He received bactrim prophylaxis.    NEURO  He had intermittent headaches that were controlled with tylenol and PRN oxycodone. His headaches resolved prior to discharge. He received melatonin at bedtime.    GENITOURINARY  6 vials of sperm were collected on 8/31 for fertility preservation.     Significant Results and Procedures   Lumbar puncture 8/31: Cytology and CSF studies were negative for malignancy  Bone marrow biopsy 9/1: Revealed no evidence of involvement by T lymphoblastic lymphoma    Immunization History   Immunization Status:  up to date and documented     Pending Results   These results will be followed up by primary heme/onc team.  Unresulted Labs Ordered in the Past 30 Days of this Admission     Date and Time Order Name Status Description    9/9/2019 1306 Cytology non gyn In process     9/1/2019 1609 FISH In process     8/31/2019 2043 CHROMOSOME ANALYSIS LEUKEMIA With Professional Interpretation In process     8/31/2019 1831 Cytology non gyn In process     8/30/2019 2040 CHROMOSOME BONE MARROW With Professional Interpretation In process           Primary Care Physician   Rodriguez Montoya  Home clinic: [address] [address]    Physical Exam   Vital Signs with Ranges  Temp:  [97.9  F (36.6  C)-98.5  F (36.9  C)] 98  F (36.7  C)  Pulse:  [45-78] 49  Heart Rate:  [43-70] 64  Resp:  [14-18] 16  BP: (104-124)/(53-62) 107/57  SpO2:  [97 %-100 %] 99 %  I/O last 3 completed shifts:  In: 3150 [P.O.:1450; I.V.:1700]  Out: 7517  [Urine:3875]      Appearance: Alert and appropriate, well developed, nontoxic.  HEENT: Head: Normocephalic and atraumatic. Eyes: PERRL, EOM grossly intact, conjunctivae clear. Nose: Nares clear with no active discharge.  Mouth/Throat: Moist mucous membranes.  Neck: Supple.  Respiratory: Normal work of breathing on room air, no respiratory distress.  Cardiovascular: Regular rate and rhythm, normal S1 and S2, with no murmurs.  Normal symmetric peripheral pulses and brisk cap refill.  Abdominal: Soft, nontender, nondistended..  Neurologic: Alert and oriented, cranial nerves II-XII grossly intact, moving all extremities equally with grossly normal coordination and normal gait.  Skin: No rashes, ecchymoses, or lacerations.    Discharge Disposition   Discharged to home  Condition at discharge: Stable    Consultations This Hospital Stay   ANESTHESIOLOGY IP CONSULT  INTERVENTIONAL RADIOLOGY ADULT/PEDS IP CONSULT  PEDS CARDIOLOGY IP CONSULT  INTERVENTIONAL RADIOLOGY ADULT/PEDS IP CONSULT  SOCIAL WORK IP CONSULT  NUTRITION SERVICES PEDS IP CONSULT  PEDS HEM/ONC IP CONSULT  PHYSICAL THERAPY PEDS IP CONSULT  SPEECH LANGUAGE PATH PEDS IP CONSULT    Discharge Orders      Home infusion referral      Reason for your hospital stay    Chemotherapy admission     Follow Up and recommended labs and tests    With previously scheduled appointment with N peds heme/onc     Activity    Your activity upon discharge: activity as tolerated     Full Code     Diet    Follow this diet upon discharge: Orders Placed This Encounter      NPO for Medical/Clinical Reasons Except for: Meds     Discharge Medications   Discharge Medication List as of 9/9/2019  5:58 PM      START taking these medications    Details   dexamethasone (DECADRON) 6 MG tablet Take 1 tablet (6 mg) by mouth every 12 hours for 19 days, Disp-38 tablet, R-0, E-Prescribe      diphenhydrAMINE (BENADRYL) 25 MG capsule Take 1-2 capsules (25-50 mg) by mouth every 6 hours as needed  (Breakthrough Nausea and Vomiting ), Disp-30 capsule, R-1, E-Prescribe      melatonin 3 MG tablet Take 1 tablet (3 mg) by mouth At Bedtime, Disp-30 tablet, R-1, E-Prescribe      ondansetron (ZOFRAN) 8 MG tablet Take 1 tablet (8 mg) by mouth every 6 hours as needed for nausea, Disp-30 tablet, R-1, E-Prescribe      pantoprazole (PROTONIX) 40 MG EC tablet Take 1 tablet (40 mg) by mouth daily for 20 days . This acid blocker helps prevent stomach pain while on your decadron chemotherapy., Disp-20 tablet, R-0, E-Prescribe      polyethylene glycol (MIRALAX/GLYCOLAX) packet Take 17 g by mouth daily, Disp-30 packet, R-0, E-Prescribe      sennosides (SENOKOT) 8.6 MG tablet Take 2 tablets by mouth 2 times daily as needed for constipation, Disp-60 each, R-1, E-Prescribe      sulfamethoxazole-trimethoprim (BACTRIM DS/SEPTRA DS) 800-160 MG tablet Take 1 tablet by mouth Every Mon, Tues two times daily, Disp-16 tablet, R-0, E-Prescribe         CONTINUE these medications which have CHANGED    Details   oxyCODONE (ROXICODONE) 5 MG tablet Take 1 tablet (5 mg) by mouth every 6 hours as needed for moderate to severe pain, Disp-10 tablet, R-0, Local Print         CONTINUE these medications which have NOT CHANGED    Details   NO ACTIVE MEDICATIONS ., Historical         STOP taking these medications       ondansetron (ZOFRAN) 2 MG/ML SOLN injection Comments:   Reason for Stopping:             Allergies   Allergies   Allergen Reactions     No Known Drug Allergies      Data   Most Recent 3 CBC's:  Recent Labs   Lab Test 09/08/19  0800 09/06/19  0532 09/04/19  0420   WBC 8.1 8.2 9.2   HGB 12.9* 12.5* 12.0*   MCV 82 83 82    269 304      Most Recent 3 BMP's:  Recent Labs   Lab Test 09/09/19  0637 09/08/19  0800 09/07/19  0647    137 138   POTASSIUM 4.2 4.6 4.1   CHLORIDE 105 105 106   CO2 27 28 28   BUN 22 18 19   CR 0.51* 0.55* 0.55*   ANIONGAP 4 4 4   MICHAEL 7.5* 7.7* 7.6*   * 88 125*     Most Recent 2 LFT's:  Recent Labs    Lab Test 09/01/19  1740 09/01/19  1705   AST 8 Canceled, Test credited   ALT 23 Canceled, Test credited   ALKPHOS 74 Canceled, Test credited   BILITOTAL 0.3 Canceled, Test credited     Most Recent INR's and Anticoagulation Dosing History:  Anticoagulation Dose History     Recent Dosing and Labs Latest Ref Rng & Units 8/30/2019    INR 0.86 - 1.14 1.17(H)        Results for orders placed or performed during the hospital encounter of 08/30/19   XR Chest 2 Views    Narrative    XR CHEST 2 VW  8/30/2019 4:23 PM      HISTORY: New diagnosis of ALL    COMPARISON: None    FINDINGS:   PA and lateral views of the chest. The cardiac silhouette size is  normal. There is a superior mediastinal mass. Question a few small  calcifications within the mass. There is a small left pleural  effusion. There are adjacent hazy pulmonary opacities in the left  lower lobe. The lungs are otherwise clear. The visualized upper  abdomen and bones are normal.      Impression    IMPRESSION:   1. Superior mediastinal mass.  2. Small left pleural effusion with adjacent left basilar opacities,  favoring atelectasis. Infection would be difficult to exclude.    [Result: Mediastinal mass]    Finding was identified on 8/30/2019 4:25 PM.     Dr. Mitchell was contacted by Dr. Pink at 8/30/2019 4:28 PM and  verbalized understanding of the finding.     FATOUMATA PINK MD   XR Surgery AUTUMN L/T 5 Min Fluoro w Stills    Narrative    Procedures: Image-guided right upper extremity PICC line placement,  8/31/2019    Clinical indication: 19-year-old with recently diagnosed leukemia.  Need for central venous access for chemotherapy administration.    Comparison studies: 8/30/2019.    PROCEDURE:        Interventional radiologists: Michell Escamilla MD (IR staff)    Fellow: Opal Isbell MD    Consent: verbal and written informed consent obtained prior to  procedure.    Procedure details: Patient placed in the seated position. The right  upper extremity was prepped and  draped in standard sterile fashion  after preprocedural ultrasound with tourniquet demonstrated patent  superficial veins, including the basilic vein. Timeout performed. 1%  lidocaine was used to anesthetize the dermis and proposed needle tract  adjacent to the basilic vein. Using ultrasound guidance, a 21-gauge  micropuncture needle was advanced into the basilic vein. Image saved  in the patient's chart. A microguidewire was advanced into the basilic  vein. The dilator with peel-away sheath advanced over the wire. The  C-arm was then used to identify the needle as it was advanced into the  mid SVC. A clamp was used to measure the length of the catheter. The  wire was then removed and the sheath locked. The 4 Chadian double lumen  PICC line was then cut to length and flushed with saline. It was  advanced over the peel-away sheath, which was subsequently removed.  Fluoroscopy confirmed positioning within the mid SVC. The catheter was  secured to the skin using 3-0 Ethilon suture. A sterile dressing was  placed. No immediate complication.     Medications: Per anesthesiology team. 1% lidocaine without epinephrine  was used for local anesthesia.    Monitoring: The patient was placed on continuous monitoring. Vital  signs and sedation monitored by nursing staff under my supervision.  The patient remained stable throughout the procedure.    Sedation time: Not applicable.    Fluoroscopy time: 0.2 minutes    Complications: None.      Impression    IMPRESSION:     Placement of 4 Chadian, dual-lumen PICC line via the right basilic  vein. The tip is positioned within the mid SVC.    PLAN:    Line available for immediate use.     IR Chest Tube Place Non Tunneled Left    Narrative    Procedures: Ultrasound-guided left chest tube placement, 8/31/2019.    Clinical indication: Recently diagnosed leukemia. Symptomatic left  pleural effusion.    Comparison studies: 8/30/2019.    PROCEDURE:        Interventional radiologists: Michell  Francine LALA (IR staff)    Fellow: Opal Isbell MD    Consent: verbal and written informed consent obtained prior to  procedure.    Procedure details: Patient placed in the seated position. The left  chest was prepped and draped in standard sterile fashion.  Preprocedural ultrasound documented anechoic left pleural effusion  with adequate percutaneous window for drainage. Timeout performed. 1%  lidocaine was used to anesthetize the dermis and proposed needle  tract. Using ultrasound guidance, a Mitraligneh catheter was advanced into  the pleural space. Image saved in the patient's permanent medical  record. The catheter was advanced off of the stylette on the stylette  removed. A Workboard wire was advanced through the catheter into the  pleural space. The catheter was removed over the wire. The tract was  dilated, followed by placement of 8 Montserratian Flexima catheter. The inner  dilator and wire were removed. The tube was attached to chest tube  chamber apparatus. Approximately 1.8 L of serous fluid were drained in  the next several minutes. The catheter was secured to the skin using a  2-0 Ethilon suture. No immediate complication.     Medications: 1% lidocaine.     Monitoring: The patient was placed on continuous monitoring. Vital  signs and sedation monitored by anesthesiology staff. The patient  remained stable throughout the procedure.    Sedation time: Not applicable    Complications: None.      Impression    IMPRESSION:     Placement of 8 Montserratian Flexima catheter into the left pleural space.  1.8 L of serous fluid drained.    PLAN:    Chest tube to -20 cm water suction.     IR PICC Placement > 5 Yrs of Age    Narrative    This exam was marked as non-reportable because it will not be read by a   radiologist or a [facility] non-radiologist provider.             XR Chest Port 1 View    Narrative    XR CHEST PORT 1 VW  8/31/2019 8:17 PM      HISTORY: Follow up after drainage of pleural effusion    COMPARISON: Previous  day    FINDINGS:   Portable upright view of the chest. Right arm PICC tip projects over  the high SVC. There is a new small left basilar pleural catheter  partially visualized. Decrease in left-sided pleural effusion. New  tiny left pneumothorax.    The cardiac silhouette size is not enlarged. Mediastinal mass  redemonstrated. Mild patchy left basilar opacities are decreased.      Impression    IMPRESSION:   Decreased pleural fluid with new tiny left pneumothorax following  pleural catheter placement.    FATOUMATA PINK MD   XR Chest Port 1 View    Narrative    XR CHEST PORT 1 VW  9/1/2019 6:58 AM      HISTORY: Monitor mediastinal mass, pleural effusion with chest tube in  place    COMPARISON: Previous day    FINDINGS:   Portable upright view of the chest. Right arm PICC tip projects over  the high SVC. Left basilar chest tube is stable in position. No  significant pneumothorax. Trace bilateral pleural effusions. The  cardiac silhouette size is stable. Mediastinal mass is grossly  unchanged from yesterday's examination.      Impression    IMPRESSION:   Trace amount of pleural fluid bilaterally, left basilar chest tube  stable in position.    FATOUMATA PINK MD   US Abdomen Complete Portable    Narrative    EXAMINATION: US ABDOMEN COMPLETE PORTABLE  9/1/2019 7:53 AM      CLINICAL HISTORY: evaluate for hepatosplenomegaly and lymphadenopathy  in newly diagnosed lymphoma patient    COMPARISON: None available.        FINDINGS:  The liver is normal in contour and echogenicity. The liver measures  16.3 cm in craniocaudal dimension. There is no intrahepatic or  extrahepatic biliary ductal dilatation. The common bile duct measures  3 mm. The gallbladder is normal, without gallstones, wall thickening,  or pericholecystic fluid.    The spleen measures maximally 11.6 cm and is normal in appearance. The  visualized portions of the pancreas are normal in echogenicity.    The visualized upper abdominal aorta and inferior vena cava  are  normal.      The kidneys are normal in position and demonstrate borderline  increased echogenicity. The right kidney measures 14.2 cm and the left  kidney measures 12.8 cm. There is no significant urinary tract  dilation. The urinary bladder is moderately distended with significant  echogenic debris. The bladder wall is normal.    There is a small right pleural effusion.      Impression    IMPRESSION:   1. Liver size at the upper limits of normal. No splenomegaly.  2. Borderline echogenic renal parenchyma, which can be seen with  medical renal disease. Asymmetric nephromegaly on the right.  3. Significant amount of debris within the bladder. Recommend  correlation with urinalysis.  4. Small right pleural effusion.    I have personally reviewed the examination and initial interpretation  and I agree with the findings.    FATOUMATA PINK MD     Physician Attestation   I, Alexi Hayes, saw and evaluated this patient prior to discharge.  I discussed the patient with the resident/fellow and agree with plan of care as documented in the note.      I personally reviewed vital signs, medications and labs.    I personally spent 35 minutes on discharge activities.    Alexi Hayes MD  Date of Service (when I saw the patient): 9/9/19

## 2019-09-02 ENCOUNTER — APPOINTMENT (OUTPATIENT)
Dept: CARDIOLOGY | Facility: CLINIC | Age: 19
DRG: 847 | End: 2019-09-02
Attending: PEDIATRICS
Payer: COMMERCIAL

## 2019-09-02 ENCOUNTER — APPOINTMENT (OUTPATIENT)
Dept: GENERAL RADIOLOGY | Facility: CLINIC | Age: 19
DRG: 847 | End: 2019-09-02
Attending: PEDIATRICS
Payer: COMMERCIAL

## 2019-09-02 DIAGNOSIS — C95.00 ACUTE LEUKEMIA (H): ICD-10-CM

## 2019-09-02 DIAGNOSIS — Z31.84 ENCOUNTER FOR FERTILITY PRESERVATION PROCEDURE: ICD-10-CM

## 2019-09-02 LAB
ALBUMIN SERPL-MCNC: 2.4 G/DL (ref 3.4–5)
ANION GAP SERPL CALCULATED.3IONS-SCNC: 6 MMOL/L (ref 3–14)
ANION GAP SERPL CALCULATED.3IONS-SCNC: 7 MMOL/L (ref 3–14)
BUN SERPL-MCNC: 12 MG/DL (ref 7–30)
BUN SERPL-MCNC: 13 MG/DL (ref 7–30)
CA-I BLD-MCNC: 4.4 MG/DL (ref 4.4–5.2)
CA-I BLD-MCNC: 4.5 MG/DL (ref 4.4–5.2)
CA-I BLD-MCNC: 4.6 MG/DL (ref 4.4–5.2)
CA-I BLD-MCNC: 4.6 MG/DL (ref 4.4–5.2)
CALCIUM SERPL-MCNC: 7.2 MG/DL (ref 8.5–10.1)
CALCIUM SERPL-MCNC: 7.3 MG/DL (ref 8.5–10.1)
CALCIUM SERPL-MCNC: 7.4 MG/DL (ref 8.5–10.1)
CALCIUM SERPL-MCNC: 7.5 MG/DL (ref 8.5–10.1)
CHLORIDE SERPL-SCNC: 113 MMOL/L (ref 94–109)
CHLORIDE SERPL-SCNC: 114 MMOL/L (ref 94–109)
CHLORIDE SERPL-SCNC: 114 MMOL/L (ref 94–109)
CHLORIDE SERPL-SCNC: 115 MMOL/L (ref 94–109)
CO2 SERPL-SCNC: 22 MMOL/L (ref 20–32)
CO2 SERPL-SCNC: 23 MMOL/L (ref 20–32)
CREAT SERPL-MCNC: 0.77 MG/DL (ref 0.66–1.25)
CREAT SERPL-MCNC: 0.78 MG/DL (ref 0.66–1.25)
CREAT SERPL-MCNC: 0.79 MG/DL (ref 0.66–1.25)
CREAT SERPL-MCNC: 0.82 MG/DL (ref 0.66–1.25)
GFR SERPL CREATININE-BSD FRML MDRD: >90 ML/MIN/{1.73_M2}
GLUCOSE SERPL-MCNC: 130 MG/DL (ref 70–99)
GLUCOSE SERPL-MCNC: 130 MG/DL (ref 70–99)
GLUCOSE SERPL-MCNC: 152 MG/DL (ref 70–99)
GLUCOSE SERPL-MCNC: 159 MG/DL (ref 70–99)
MAGNESIUM SERPL-MCNC: 2.4 MG/DL (ref 1.6–2.3)
MAGNESIUM SERPL-MCNC: 2.4 MG/DL (ref 1.6–2.3)
MAGNESIUM SERPL-MCNC: 2.5 MG/DL (ref 1.6–2.3)
MAGNESIUM SERPL-MCNC: 2.5 MG/DL (ref 1.6–2.3)
PHOSPHATE SERPL-MCNC: 2.9 MG/DL (ref 2.5–4.5)
PHOSPHATE SERPL-MCNC: 3.3 MG/DL (ref 2.5–4.5)
PHOSPHATE SERPL-MCNC: 3.8 MG/DL (ref 2.5–4.5)
PHOSPHATE SERPL-MCNC: 4 MG/DL (ref 2.5–4.5)
POTASSIUM SERPL-SCNC: 3.9 MMOL/L (ref 3.4–5.3)
POTASSIUM SERPL-SCNC: 4 MMOL/L (ref 3.4–5.3)
SODIUM SERPL-SCNC: 142 MMOL/L (ref 133–144)
SODIUM SERPL-SCNC: 143 MMOL/L (ref 133–144)
SODIUM SERPL-SCNC: 143 MMOL/L (ref 133–144)
SODIUM SERPL-SCNC: 144 MMOL/L (ref 133–144)
URATE SERPL-MCNC: 0.5 MG/DL (ref 3.5–7.2)

## 2019-09-02 PROCEDURE — 84550 ASSAY OF BLOOD/URIC ACID: CPT | Performed by: PEDIATRICS

## 2019-09-02 PROCEDURE — 89320 SEMEN ANAL VOL/COUNT/MOT: CPT

## 2019-09-02 PROCEDURE — 20300000 ZZH R&B PICU UMMC

## 2019-09-02 PROCEDURE — 88275 CYTOGENETICS 100-300: CPT | Performed by: STUDENT IN AN ORGANIZED HEALTH CARE EDUCATION/TRAINING PROGRAM

## 2019-09-02 PROCEDURE — 83735 ASSAY OF MAGNESIUM: CPT | Performed by: STUDENT IN AN ORGANIZED HEALTH CARE EDUCATION/TRAINING PROGRAM

## 2019-09-02 PROCEDURE — 84100 ASSAY OF PHOSPHORUS: CPT | Performed by: STUDENT IN AN ORGANIZED HEALTH CARE EDUCATION/TRAINING PROGRAM

## 2019-09-02 PROCEDURE — 00000102 ZZHCL STATISTIC CYTO WRIGHT STAIN TC: Performed by: STUDENT IN AN ORGANIZED HEALTH CARE EDUCATION/TRAINING PROGRAM

## 2019-09-02 PROCEDURE — 82330 ASSAY OF CALCIUM: CPT | Performed by: STUDENT IN AN ORGANIZED HEALTH CARE EDUCATION/TRAINING PROGRAM

## 2019-09-02 PROCEDURE — 88341 IMHCHEM/IMCYTCHM EA ADD ANTB: CPT | Performed by: STUDENT IN AN ORGANIZED HEALTH CARE EDUCATION/TRAINING PROGRAM

## 2019-09-02 PROCEDURE — 25000128 H RX IP 250 OP 636: Performed by: PEDIATRICS

## 2019-09-02 PROCEDURE — 25000128 H RX IP 250 OP 636: Performed by: STUDENT IN AN ORGANIZED HEALTH CARE EDUCATION/TRAINING PROGRAM

## 2019-09-02 PROCEDURE — 25000132 ZZH RX MED GY IP 250 OP 250 PS 637: Performed by: PEDIATRICS

## 2019-09-02 PROCEDURE — 71045 X-RAY EXAM CHEST 1 VIEW: CPT

## 2019-09-02 PROCEDURE — 88271 CYTOGENETICS DNA PROBE: CPT | Performed by: STUDENT IN AN ORGANIZED HEALTH CARE EDUCATION/TRAINING PROGRAM

## 2019-09-02 PROCEDURE — 83735 ASSAY OF MAGNESIUM: CPT | Performed by: PEDIATRICS

## 2019-09-02 PROCEDURE — 80048 BASIC METABOLIC PNL TOTAL CA: CPT | Performed by: STUDENT IN AN ORGANIZED HEALTH CARE EDUCATION/TRAINING PROGRAM

## 2019-09-02 PROCEDURE — 80069 RENAL FUNCTION PANEL: CPT | Performed by: PEDIATRICS

## 2019-09-02 PROCEDURE — 25000132 ZZH RX MED GY IP 250 OP 250 PS 637: Performed by: STUDENT IN AN ORGANIZED HEALTH CARE EDUCATION/TRAINING PROGRAM

## 2019-09-02 PROCEDURE — 25800030 ZZH RX IP 258 OP 636: Performed by: STUDENT IN AN ORGANIZED HEALTH CARE EDUCATION/TRAINING PROGRAM

## 2019-09-02 PROCEDURE — 88305 TISSUE EXAM BY PATHOLOGIST: CPT | Performed by: STUDENT IN AN ORGANIZED HEALTH CARE EDUCATION/TRAINING PROGRAM

## 2019-09-02 PROCEDURE — 93306 TTE W/DOPPLER COMPLETE: CPT

## 2019-09-02 PROCEDURE — 88342 IMHCHEM/IMCYTCHM 1ST ANTB: CPT | Performed by: STUDENT IN AN ORGANIZED HEALTH CARE EDUCATION/TRAINING PROGRAM

## 2019-09-02 PROCEDURE — 00000155 ZZHCL STATISTIC H-CELL BLOCK W/STAIN: Performed by: STUDENT IN AN ORGANIZED HEALTH CARE EDUCATION/TRAINING PROGRAM

## 2019-09-02 PROCEDURE — 88112 CYTOPATH CELL ENHANCE TECH: CPT | Performed by: STUDENT IN AN ORGANIZED HEALTH CARE EDUCATION/TRAINING PROGRAM

## 2019-09-02 RX ORDER — SULFAMETHOXAZOLE/TRIMETHOPRIM 800-160 MG
1 TABLET ORAL
Status: DISCONTINUED | OUTPATIENT
Start: 2019-09-02 | End: 2019-09-09 | Stop reason: HOSPADM

## 2019-09-02 RX ADMIN — ACETAMINOPHEN 650 MG: 325 TABLET, FILM COATED ORAL at 19:55

## 2019-09-02 RX ADMIN — SULFAMETHOXAZOLE AND TRIMETHOPRIM 1 TABLET: 800; 160 TABLET ORAL at 19:56

## 2019-09-02 RX ADMIN — Medication 3 MG: at 23:42

## 2019-09-02 RX ADMIN — SULFAMETHOXAZOLE AND TRIMETHOPRIM 1 TABLET: 800; 160 TABLET ORAL at 10:57

## 2019-09-02 RX ADMIN — DEXTROSE AND SODIUM CHLORIDE: 5; 900 INJECTION, SOLUTION INTRAVENOUS at 07:24

## 2019-09-02 RX ADMIN — DEXAMETHASONE SODIUM PHOSPHATE 9.76 MG: 4 INJECTION, SOLUTION INTRAMUSCULAR; INTRAVENOUS at 07:41

## 2019-09-02 RX ADMIN — DEXAMETHASONE SODIUM PHOSPHATE 9.76 MG: 4 INJECTION, SOLUTION INTRAMUSCULAR; INTRAVENOUS at 19:57

## 2019-09-02 RX ADMIN — DIPHENHYDRAMINE HYDROCHLORIDE 25 MG: 50 INJECTION, SOLUTION INTRAMUSCULAR; INTRAVENOUS at 00:10

## 2019-09-02 RX ADMIN — FAMOTIDINE 20 MG: 20 INJECTION, SOLUTION INTRAVENOUS at 19:56

## 2019-09-02 RX ADMIN — ONDANSETRON 8 MG: 2 INJECTION INTRAMUSCULAR; INTRAVENOUS at 18:42

## 2019-09-02 RX ADMIN — ONDANSETRON 8 MG: 2 INJECTION INTRAMUSCULAR; INTRAVENOUS at 12:20

## 2019-09-02 RX ADMIN — Medication 150 MG: at 07:43

## 2019-09-02 RX ADMIN — DEXTROSE AND SODIUM CHLORIDE: 5; 900 INJECTION, SOLUTION INTRAVENOUS at 00:56

## 2019-09-02 RX ADMIN — ONDANSETRON 8 MG: 2 INJECTION INTRAMUSCULAR; INTRAVENOUS at 06:30

## 2019-09-02 RX ADMIN — ACETAMINOPHEN 650 MG: 325 TABLET, FILM COATED ORAL at 13:31

## 2019-09-02 RX ADMIN — Medication 150 MG: at 13:31

## 2019-09-02 RX ADMIN — Medication 4 ML: at 05:29

## 2019-09-02 RX ADMIN — DIPHENHYDRAMINE HYDROCHLORIDE 50 MG: 25 CAPSULE ORAL at 23:42

## 2019-09-02 RX ADMIN — Medication 150 MG: at 19:55

## 2019-09-02 RX ADMIN — ACETAMINOPHEN 650 MG: 325 TABLET, FILM COATED ORAL at 01:57

## 2019-09-02 RX ADMIN — ACETAMINOPHEN 650 MG: 325 TABLET, FILM COATED ORAL at 07:43

## 2019-09-02 RX ADMIN — FAMOTIDINE 20 MG: 20 INJECTION, SOLUTION INTRAVENOUS at 08:58

## 2019-09-02 RX ADMIN — ONDANSETRON 8 MG: 2 INJECTION INTRAMUSCULAR; INTRAVENOUS at 00:05

## 2019-09-02 RX ADMIN — DEXTROSE AND SODIUM CHLORIDE: 5; 900 INJECTION, SOLUTION INTRAVENOUS at 13:31

## 2019-09-02 NOTE — CONSULTS
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 He is being transferred to the PICU post-op for close respiratory monitoring given his airway compromise and to start therapy with high risk for tumour lysis syndrome. In addition, he is at increased risk for clotting given hypercoagulable state and compression of vessels from mass.    Recommendations  -Staging: OSH chest CT in PAX; LP; BMB; AUS. We will follow pending studies. Unable to lie flat for C/A/P CT scans at this time.  -Plan to start induction therapy per [id number]. 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    Primary Care Physician   Rodriguez Montoya    Chief Complaint   T cell neoplasm    History is obtained from the patient and the patient's parent(s)    History of Present Illness   Lazaro Lund is a 19 year old male who presents with mediastinal mass. His symptoms started 3 months  ago with cough that persisted. 1 month ago, he was seen by his PCP and prescribed antibiotics. When he did not have improvement of symptoms, he was seen on 8/20/19 again and an XR revealed pleural effusion and mediastinal msas. He had a thoracentesis on 8/21 followed by a chest CT and repeat thoracentesis on 8/27. 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 His symptoms that started with cough progressed over the past few weeks to having shortness of breath with lying down, cough also worse with lying down, decreased appetite, and fatigue from poor sleep quality. He did not have recurrent fevers, night sweats, or weight loss. He has noticed tender swollen glands on his left neck and left axilla. No headaches, back pain, abdominal pain, abdominal distention, testicular swelling    Past Medical History    Born term  No chronic illnesses    Past Surgical History   Past surgical history reviewed with no previous surgeries identified.    Immunization History   Immunization Status:  Delayed per [facility]    Prior to Admission Medications   Prior to Admission Medications   Prescriptions Last Dose Informant Patient Reported? Taking?   NO ACTIVE MEDICATIONS   Yes No   Sig: .      Facility-Administered Medications: None     Allergies   Allergies   Allergen Reactions     No Known Drug Allergies        Social History   I have updated and reviewed the following Social History Narrative:   Pediatric History   Patient Guardian Status     Mother:  JHONATHAN RODRIGUEZ     Father:  JENNIFERAVNI     Other Topics Concern     Not on file   Social History Narrative    Lives at home in [place] with Dad and Step-Mom. Biological mother is involved in his life and accompanied him during this hospitalization. Has 4 step-siblings and 1 biological sibling. 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 Has completed high school.       Family History   I have reviewed this patient's family history and updated it with pertinent information if needed.   Family History   Problem Relation Age of Onset     No Known Problems Mother      No Known Problems Father      Asthma Brother      Thyroid Cancer Paternal Grandmother      Melanoma Paternal Aunt        Review of Systems   The 10 point Review of Systems is negative other than noted in the HPI or here.     Physical Exam   Temp: 98  F (36.7  C) Temp src: Axillary BP: 113/67 Pulse: 103 Heart Rate: 96 Resp: 22 SpO2: 100 % O2 Device: None (Room air) Oxygen Delivery: 2 LPM  Vital Signs with Ranges  Temp:  [96.8  F (36  C)-98.6  F (37  C)] 98  F (36.7  C)  Pulse:  [] 103  Heart Rate:  [] 96  Resp:  [17-28] 22  BP: ()/(58-91) 113/67  SpO2:  [92 %-100 %] 100 %  165 lbs 12.57 oz     Appearance: Alert and appropriate, well developed, nontoxic, with moist mucous membranes.  HEENT: Head: Normocephalic and atraumatic. Eyes: PERRL, EOM grossly intact, conjunctivae and sclerae clear. Nose: Nares clear with no active discharge.  Mouth/Throat: No oral lesions, pharynx clear with no erythema or exudate.  Neck: Supple, left side mildly tender masses, with significant cervical lymphadenopathy  Pulmonary: No grunting, flaring, retractions or stridor. Good air entry, clear to auscultation on right, no wheezing, rales in left base with inspiration and expiration.   Cardiovascular: Regular rate and rhythm, normal S1 and S2, with no murmurs.  Normal symmetric peripheral pulses and brisk cap refill.  Abdominal: Normal bowel sounds, soft, nontender, nondistended, with no masses and no hepatosplenomegaly.  Neurologic: Alert and oriented, cranial nerves II-XII grossly intact, moving all extremities equally with grossly normal coordination and normal gait.  Extremities/Back: No deformity, no CVA tenderness.  Skin: No significant  rashes, ecchymoses, or lacerations.    Data   Results for orders placed or performed during the hospital encounter of 08/30/19 (from the past 24 hour(s))   Basic metabolic panel   Result Value Ref Range    Sodium 141 133 - 144 mmol/L    Potassium 3.3 (L) 3.4 - 5.3 mmol/L    Chloride 107 94 - 109 mmol/L    Carbon Dioxide 25 20 - 32 mmol/L    Anion Gap 9 3 - 14 mmol/L    Glucose 111 (H) 70 - 99 mg/dL    Urea Nitrogen 3 (L) 7 - 30 mg/dL    Creatinine 0.58 (L) 0.66 - 1.25 mg/dL    GFR Estimate >90 >60 mL/min/[1.73_m2]    GFR Estimate If Black >90 >60 mL/min/[1.73_m2]    Calcium 7.7 (L) 8.5 - 10.1 mg/dL   Phosphorus   Result Value Ref Range    Phosphorus 3.8 2.5 - 4.5 mg/dL   Magnesium   Result Value Ref Range    Magnesium 1.8 1.6 - 2.3 mg/dL   Uric acid   Result Value Ref Range    Uric Acid 3.0 (L) 3.5 - 7.2 mg/dL   Uric acid   Result Value Ref Range    Uric Acid <0.2 (L) 3.5 - 7.2 mg/dL   Albumin level   Result Value Ref Range    Albumin 2.7 (L) 3.4 - 5.0 g/dL   Magnesium   Result Value Ref Range    Magnesium 2.2 1.6 - 2.3 mg/dL   Phosphorus   Result Value Ref Range    Phosphorus 4.1 2.5 - 4.5 mg/dL   Basic metabolic panel   Result Value Ref Range    Sodium 140 133 - 144 mmol/L    Potassium 4.0 3.4 - 5.3 mmol/L    Chloride 109 94 - 109 mmol/L    Carbon Dioxide 25 20 - 32 mmol/L    Anion Gap 6 3 - 14 mmol/L    Glucose 181 (H) 70 - 99 mg/dL    Urea Nitrogen 6 (L) 7 - 30 mg/dL    Creatinine 0.59 (L) 0.66 - 1.25 mg/dL    GFR Estimate >90 >60 mL/min/[1.73_m2]    GFR Estimate If Black >90 >60 mL/min/[1.73_m2]    Calcium 8.0 (L) 8.5 - 10.1 mg/dL   XR Chest Port 1 View    Narrative    XR CHEST PORT 1 VW  9/1/2019 6:58 AM      HISTORY: Monitor mediastinal mass, pleural effusion with chest tube in  place    COMPARISON: Previous day    FINDINGS:   Portable upright view of the chest. Right arm PICC tip projects over  the high SVC. Left basilar chest tube is stable in position. No  significant pneumothorax. Trace bilateral pleural  effusions. The  cardiac silhouette size is stable. Mediastinal mass is grossly  unchanged from yesterday's examination.      Impression    IMPRESSION:   Trace amount of pleural fluid bilaterally, left basilar chest tube  stable in position.    FATOUMATA PINK MD   US Abdomen Complete Portable    Narrative    EXAMINATION: US ABDOMEN COMPLETE PORTABLE  9/1/2019 7:53 AM      CLINICAL HISTORY: evaluate for hepatosplenomegaly and lymphadenopathy  in newly diagnosed lymphoma patient    COMPARISON: None available.        FINDINGS:  The liver is normal in contour and echogenicity. The liver measures  16.3 cm in craniocaudal dimension. There is no intrahepatic or  extrahepatic biliary ductal dilatation. The common bile duct measures  3 mm. The gallbladder is normal, without gallstones, wall thickening,  or pericholecystic fluid.    The spleen measures maximally 11.6 cm and is normal in appearance. The  visualized portions of the pancreas are normal in echogenicity.    The visualized upper abdominal aorta and inferior vena cava are  normal.      The kidneys are normal in position and demonstrate borderline  increased echogenicity. The right kidney measures 14.2 cm and the left  kidney measures 12.8 cm. There is no significant urinary tract  dilation. The urinary bladder is moderately distended with significant  echogenic debris. The bladder wall is normal.    There is a small right pleural effusion.      Impression    IMPRESSION:   1. Liver size at the upper limits of normal. No splenomegaly.  2. Borderline echogenic renal parenchyma, which can be seen with  medical renal disease. Asymmetric nephromegaly on the right.  3. Significant amount of debris within the bladder. Recommend  correlation with urinalysis.  4. Small right pleural effusion.    I have personally reviewed the examination and initial interpretation  and I agree with the findings.    FATOUMATA PINK MD   Uric acid   Result Value Ref Range    Uric Acid 0.2 (L) 3.5 -  7.2 mg/dL   Magnesium   Result Value Ref Range    Magnesium 2.2 1.6 - 2.3 mg/dL   Phosphorus   Result Value Ref Range    Phosphorus 4.6 (H) 2.5 - 4.5 mg/dL   Basic metabolic panel   Result Value Ref Range    Sodium 142 133 - 144 mmol/L    Potassium 3.9 3.4 - 5.3 mmol/L    Chloride 112 (H) 94 - 109 mmol/L    Carbon Dioxide 23 20 - 32 mmol/L    Anion Gap 7 3 - 14 mmol/L    Glucose 141 (H) 70 - 99 mg/dL    Urea Nitrogen 9 7 - 30 mg/dL    Creatinine 0.71 0.66 - 1.25 mg/dL    GFR Estimate >90 >60 mL/min/[1.73_m2]    GFR Estimate If Black >90 >60 mL/min/[1.73_m2]    Calcium 8.0 (L) 8.5 - 10.1 mg/dL   Echo Pediatric (TTE) Complete    Narrative    [id number]  [id number]  [id number]  [id number]^LE^CARMENCITA^KAHLIL                                                                   Study ID: [id number]                                                 [facility] [place] [address]                                                Phone: [phone]                                Pediatric Echocardiogram  _____________________________________________________________________________  __     Name: PLACIDO RODRIGUEZ  Study Date: 2019 09:18 AM             Patient Location: URU3  MRN: [id number]                             Age: 19 yrs  : 2000                             BP: 109/73 mmHg  Gender: Male  Patient Class: Inpatient                    Height: 183 cm  Ordering Provider: CARMENCITA LUJAN             Weight: 75 kg                                              BSA: 2.0 m2  Performed By: Sirisha Bennett  Report approved by: Savanah Butler MD  Reason For Study: Other, Please Specify in Comments  _____________________________________________________________________________  __     ------CONCLUSIONS------  Normal intracardiac  connections. There is a small to moderate circumferential  pericardial effusion. The left and right ventricles have normal chamber size,  wall thickness, and systolic function. The calculated single plane left  ventricular ejection fraction from the 4 chamber view is 67%. There is a large  anterior mediastinal mass causing compression of the left atrium, right and  left pulmonary arteries, and SVC. The RPA has continous flow with a mean  gradient is 6 mmHg with a peak gradient of 14 mmHg. The LPA has a peak  gradient of 13 mmHg. There is moderate compression and flow acceleration  through the mid level of the LA from the tumor. The mean gradient through the  area of compression is 3 mmHg. Proximal portion of the SVC is difficult to  visulize but the mean gradient is 5 mmHg with a peak gradient of 10 mmHg.  _____________________________________________________________________________  __        Technical information:  A complete two dimensional, MMODE, spectral and color Doppler transthoracic  echocardiogram is performed. Images are obtained from parasternal, apical,  subcostal and suprasternal notch views. The study quality is adequate.  Technically difficult study due to poor acoustic windows. ECG tracing shows  regular rhythm.     Segmental Anatomy:  There is normal atrial arrangement, with concordant atrioventricular and  ventriculoarterial connections.     Systemic and pulmonary veins:  The systemic venous return is normal. Normal coronary sinus. The inferior vena  cava is of normal caliber. Proximal portion of the SVC is difficult to  visulize but the mean gradient is 5 mmHg with a peak gradient of 10 mmHg. The  pulmonary veins are not well visualized.     Atria and atrial septum:  Normal right atrial size. There is moderate compression and flow acceleration  through the mid level of the LA from the tumor. The mean gradient through the  area of compression is 3 mmHg. There is no atrial level shunting.         Atrioventricular valves:  The tricuspid valve is normal in appearance and motion. Trivial tricuspid  valve insufficiency. The mitral valve is normal in appearance and motion.  There is no mitral valve insufficiency. There is significant decrease (>20%)  in the MV E-wave velocity with inspiration.     Ventricles and Ventricular Septum:  The left and right ventricles have normal chamber size, wall thickness, and  systolic function. The calculated single plane left ventricular ejection  fraction from the 4 chamber view is 67 %. There is no ventricular level  shunting.     Outflow tracts:  Normal great artery relationship. There is unobstructed flow through the right  ventricular outflow tract. The pulmonary valve motion is normal. There is  normal flow across the pulmonary valve. Trivial pulmonary valve insufficiency.  There is unobstructed flow through the left ventricular outflow tract.  Tricuspid aortic valve with normal appearance and motion. There is normal flow  across the aortic valve.     Great arteries:  The main pulmonary artery has normal appearance. There is unobstructed flow in  the main pulmonary artery. The pulmonary artery bifurcation is normal. There  is signficiant compression of both the RPA and the LPA from the tumor. The RPA  has continous flow with a mean gradient is 6 mmHg with a peak gradient of 14  mmHg. The LPA has a peak gradient of 13 mmHg. Normal ascending aorta. The  aortic arch appears normal. There is unobstructed antegrade flow in the  ascending, transverse arch, descending thoracic and abdominal aorta. There is  a left aortic arch with normal branching pattern. There is significant  decrease (>20%) in the peak flow velocity in the abdominal aorta with  inspiration.     Arterial Shunts:  The ductal region is not imaged with this study.     Coronaries:  The coronary arteries are not evaluated.        Effusions, catheters, cannulas and leads:  There is a large anterior mediastinaal  mass. There is a moderate  circumferential pericardial effusion.     MMode/2D Measurements & Calculations  4 Chamber EF: 67.0 %                LVMI(BSA): 93.9 grams/m2  LVMI(Height): 35.9                  RWT(MM): 0.57        Doppler Measurements & Calculations  PA V2 max: 75.9 cm/sec               LPA max marialuisa: 153.1 cm/sec  PA max P.3 mmHg                  LPA max P.7 mmHg     desc Ao max marialuisa: 121.2 cm/sec  desc Ao max P.9 mmHg     [place] 2D Z-SCORE VALUES  Measurement NameValue  Z-ScorePredictedNormal Range  LPA diam(2D)    0.38 cm  LVLd apical(4ch)7.8 cm  LVLs apical(4ch)6.8 cm     [place] Z-Scores (Measurements & Calculations)  Measurement NameValue      Z-ScorePredictedNormal Range  IVSd(MM)        1.1 cm     0.37   1.0      0.70 - 1.31  IVSs(MM)        1.5 cm     0.69   1.4      1.00 - 1.75  LVIDd(MM)       4.4 cm     -2.0   5.2      4.4 - 5.9  LVIDs(MM)       2.8 cm     -1.6   3.4      2.6 - 4.1  LVPWd(MM)       1.3 cm     2.4    0.95     0.69 - 1.20  LVPWs(MM)       1.6 cm     0.25   1.6      1.2 - 1.9  LV mass(C)d(MM) 183.4 grams-0.09  186.6    126.1 - 276.2  FS(MM)          37.4 %     0.67   34.9     28.5 - 42.6           Report approved by: Jocy Flower 2019 03:05 PM      Leukemia Lymphoma Evaluation Non CSF   Result Value Ref Range    Copath Report       Patient Name: PLACIDO RODRIGUEZ  MR#: [id number]  Specimen #: [id number]  Collected: 2019 13:29  Received: 2019 15:35  Reported: 2019 18:55  Ordering Phy(s): MARC MADRID    For improved result formatting, select 'View Enhanced Report Format' under   Linked Documents section.  _________________________________________    SPECIMEN(S):  Bone marrow, left    INTERPRETATION:  Bone marrow, left:       No definitive involvement by T-ALL, very rare T cell precursors   (0.01%)       No increase in myeloid blasts and no abnormal myeloid blast   population       See comment    COMMENT:  There is no definitive evidence for  T-ALL within the limits of sensitivity   of this assay. There are very rare  events with immunophenotype similar to the abnormal T lymphoblasts seen in   concurrent pleural fluid specimen  (to be reported separately as [id number]), however they do not form   discrete population and are very few, below  the limit of  sensitivity of this assay. At such a low frequenc y they are   difficult to distinguish from rare  normal T/NK  precursors. These findings are no definitive for involvement   by T-ALL, correlation with  morphologic and cytogenetic findings is required for final interpretation.    RESULTS:  Percentages reported below are based on the total number of CD45 positive   viable leukocytes. If applicable,  percentage of plasma cells is from total viable nucleated cells.    0.01% (only 17 events)  immature T/NK precursors with the following   immunophenotype:  Positive for CD2, CD4 (partial), CD7, CD34 (partial), CD38, and HLA-DR  Negative for CD8, CD13, CD14, CD19, CD33, CD56, CD64  Resident/Fellow Review by:  Dr. Floresita Goodwin    A resident/fellow in an ACGME accredited training program was involved in   the selection of testing, review of  flow scattergrams, and/or interpretation of this case. I, as the senior   physician, attest that I: (i)  confirmed appropriate testing, (ii) examined the relevant flow   scattergrams for the specimen(s); an d (ii)  rendered or confirmed the interpretation(s).    ANTIBODIES:  Four, eight and ten color analyses are performed for the following   markers: CD1a, CD2, surface and cytoplasmic  CD3, CD4, CD5, CD7, CD8, CD10, CD11b, CD13, CD14, CD15, CD16, CD19, CD20,   CD22, CD33, CD34, CD36, CD38, CD45,  CD56, CD58, CD61, CD64, , glycophorin A, HLA-DR, cytoplasmic CD79a,   cytoplasmic myeloperoxidase, and  nuclear terminal deoxynucleotidyl transferase (TdT). Cells are gated to   isolate populations (CD45 versus side  scatter and forward scatter versus side scatter), to exclude  debris   (forward scatter versus side scatter) and  to exclude cell doublets (forward scatter height versus forward scatter   width and side scatter height versus  side scatter width). Forward scatter varies with cell size. Side scatter   varies with the amount of cytoplasmic  granules. Intensity for CD45 usually increases as hematolymphoid cells   mature. The analytic sensitivity of  this assay to detect abnormal B-l ymphoblasts as rare flow events is 0.01%.    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    Electronically signed out by:    Prince Reynoso M.D.,[facility]    This test was developed and its performance characteristics determined by   [facility]. It has not been cleared or   approved by the US Food and Drug  Administration.  FDA does not require this test to go through premarket   FDA review. This test is used for  clinical purposes and should not be regarded as investigational or for   research.  This laboratory is certified  under the Clinical Laboratory Improvement Amendments (CLIA) as qualified   to perform high complexity clinical  laboratory testing.    CPT Codes:  A: 89088-48-UBLH52(29), 19205-RJ, 78574-JIEW>15, 55379-11-KRDZ80    TESTING LAB  LOCATION:  [address] [address] [phone]    COLLECTION SITE:  Client:  [facility]  Location:  URU3 (B)     Uric acid   Result Value Ref Range    Uric Acid Canceled, Test credited 3.5 - 7.2 mg/dL   Magnesium   Result Value Ref Range    Magnesium Canceled, Test credited 1.6 - 2.3 mg/dL   Phosphorus   Result Value Ref Range    Phosphorus Canceled, Test credited 2.5 - 4.5 mg/dL   Calcium  ionized whole blood (Q6H)   Result Value Ref Range    Calcium Ionized Whole Blood 4.0 (L) 4.4 - 5.2 mg/dL   CBC with platelets differential   Result Value Ref Range    WBC 9.5 4.0 - 11.0 10e9/L    RBC Count 4.50 4.4 - 5.9 10e12/L    Hemoglobin 12.0 (L) 13.3 - 17.7 g/dL    Hematocrit 37.0 (L) 40.0 - 53.0 %    MCV 82 78 - 100 fl    MCH 26.7 26.5 - 33.0 pg    MCHC 32.4 31.5 - 36.5 g/dL    RDW 11.8 10.0 - 15.0 %    Platelet Count 398 150 - 450 10e9/L    Diff Method Automated Method     % Neutrophils 89.0 %    % Lymphocytes 5.9 %    % Monocytes 4.4 %    % Eosinophils 0.0 %    % Basophils 0.1 %    % Immature Granulocytes 0.6 %    Nucleated RBCs 0 0 /100    Absolute Neutrophil 8.5 (H) 1.6 - 8.3 10e9/L    Absolute Lymphocytes 0.6 (L) 0.8 - 5.3 10e9/L    Absolute Monocytes 0.4 0.0 - 1.3 10e9/L    Absolute Eosinophils 0.0 0.0 - 0.7 10e9/L    Absolute Basophils 0.0 0.0 - 0.2 10e9/L    Abs Immature Granulocytes 0.1 0 - 0.4 10e9/L    Absolute Nucleated RBC 0.0    Comprehensive metabolic panel   Result Value Ref Range    Sodium Canceled, Test credited 133 - 144 mmol/L    Potassium Canceled, Test credited 3.4 - 5.3 mmol/L    Chloride Canceled, Test credited 94 - 109 mmol/L    Carbon Dioxide Canceled, Test credited 20 - 32 mmol/L    Anion Gap Canceled, Test credited 6 - 17 mmol/L    Glucose Canceled, Test credited 70 - 99 mg/dL    Urea Nitrogen Canceled, Test credited 7 - 30 mg/dL    Creatinine Canceled, Test credited 0.66 - 1.25 mg/dL    GFR Estimate Canceled, Test credited >60 mL/min/[1.73_m2]    GFR Estimate If Black Canceled, Test credited >60 mL/min/[1.73_m2]    Calcium Canceled, Test credited 8.5 - 10.1 mg/dL    Bilirubin Total Canceled, Test credited 0.2 - 1.3 mg/dL    Albumin Canceled, Test credited 3.4 - 5.0 g/dL    Protein Total Canceled, Test credited 6.8 - 8.8 g/dL    Alkaline Phosphatase Canceled, Test credited 40 - 150 U/L    ALT Canceled, Test credited 0 - 70 U/L    AST Canceled, Test credited 0 - 45 U/L   Glucose  whole blood   Result Value Ref Range    Glucose Canceled, Test credited 70 - 99 mg/dL   Comprehensive metabolic panel   Result Value Ref Range    Sodium 144 133 - 144 mmol/L    Potassium 3.8 3.4 - 5.3 mmol/L    Chloride 113 (H) 94 - 109 mmol/L    Carbon Dioxide 23 20 - 32 mmol/L    Anion Gap 8 3 - 14 mmol/L    Glucose 149 (H) 70 - 99 mg/dL    Urea Nitrogen 11 7 - 30 mg/dL    Creatinine 0.81 0.66 - 1.25 mg/dL    GFR Estimate >90 >60 mL/min/[1.73_m2]    GFR Estimate If Black >90 >60 mL/min/[1.73_m2]    Calcium 8.0 (L) 8.5 - 10.1 mg/dL    Bilirubin Total 0.3 0.2 - 1.3 mg/dL    Albumin 2.7 (L) 3.4 - 5.0 g/dL    Protein Total 6.2 (L) 6.8 - 8.8 g/dL    Alkaline Phosphatase 74 40 - 150 U/L    ALT 23 0 - 70 U/L    AST 8 0 - 45 U/L   Magnesium   Result Value Ref Range    Magnesium 2.3 1.6 - 2.3 mg/dL   Phosphorus   Result Value Ref Range    Phosphorus 4.7 (H) 2.5 - 4.5 mg/dL   Calcium ionized whole blood   Result Value Ref Range    Calcium Ionized Whole Blood 4.7 4.4 - 5.2 mg/dL   Uric acid   Result Value Ref Range    Uric Acid 0.5 (L) 3.5 - 7.2 mg/dL     I personally saw and examined the patient with the fellow, Dr. Fischer as above.  I personally reviewed the laboratory and imaging results as above. I agree with the assessment and plan as above. Approximately 90 minutes were spent in face to face discussion with Lazaro, his father and step-mom discussing the results so far as above and the plan to start therapy with induction according to [id number].  We discussed general side effects of pancytopenia, alopecia, nausea/vomiting, fertility, infection risks, secondary malignancies and specific toxicities of each drug.  Final staging not possible at this time given pending bone marrow.  Lazaro agreed to proceed and we will continue dexamethasone and start day 1 therapy with vincristine and daunorubicin today.  Discussed significant risk of tumor lysis syndrome and will monitor closely.  Heather Lopez, MSc, MD

## 2019-09-02 NOTE — PLAN OF CARE
Afebrile, AVSS, no prns or replacements given. Lungs clear with no increased WOB. Cx tube output increased with movement. No stool today and urine output was low, MDs are monitoring I/O.Pt was up in chair and ambulated to the bathroom a couple times. Multiple family members here to visit and pt was very social with friends on his online video game. Pt and family updated on POC.

## 2019-09-02 NOTE — PROCEDURES
Lumbar Spinal Puncture with Chemotherapy  Performed by: Trav Rose, MD  Assisted by: Joao Chung MD    Under the same anesthesia and procedure as the thoracentesis and PICC placement and following these procedures, the patient was placed in the left lateral position with head of the bed raised approximately 45'.  Dr. Chung prepped and draped the patient in a sterile manner and administered lidocaine and a dual needle LP system to minimize the need for anesthesia. Approximately 3 ml of clear, colorless CSF fluid was obtained by myself and sent to for cell count analysis as well as protein and glucose. A luer lock syringe was attached and 70 mg of cytarabine was administered without complication. The administration was completely at 1912 hrs.   A simple bandage was applied post procedure.    Trav Rose., MD  Pediatric Oncology

## 2019-09-02 NOTE — PROGRESS NOTES
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 He was started on induction therapy per [id number] on 9/1 (note: Dexamethasone started on 8/31 pending logistical ability to perform bone marrow biopsy). He is being monitored in the PICU due to high risk of TLS and due to cardiac compression by his mass. He is tolerating treatment well so far with continued improvement of symptoms.      Recommendations  -Staging: OSH chest CT in PAX; LP; BMB; AUS. We will follow pending studies. Unable to lie flat for C/A/P CT scans at this time.  -Continue induction therapy: Decadron today, day 2 of induction  -Daily chest x-rays; chest tube to suction  -Agree with daily ECHOs to re-assess cardiac compression   -TLS monitoring: Q6H BMP with Mg, Phos, iCa and uric acid  -TLS prophylaxis (s/p rasburicase 8/31): 1.5x mIVF (ensure no potassium containing fluids), 150mg allopurinol TID  -Agree with liquid diet  -Other labs: daily albumin, CBC/Coags on Wednesday and then every other day CBC  -Continue famotidine and prophylactic bactrim     Jacobo,  Nicho Fischer   Pediatric Hematology/Oncology Fellow    Interval History   No acute overnight events. Lazaro continues to state many of his symptoms are improving, including improvement of cough/shortness of breath, improvement of lymphadenopathy, and improved tolerance of swallowing liquids. His tumor lysis labs have been largely within normal limits. His chest tube drainage has slowed down but varies based on his positioning.     Physical Exam   Temp: 97  F (36.1  C) Temp src:  Oral BP: 111/66 Pulse: 79 Heart Rate: 86 Resp: 20 SpO2: 100 % O2 Device: None (Room air) Oxygen Delivery: 4 LPM  Vitals:    08/30/19 1523 08/30/19 1811   Weight: 75.5 kg (166 lb 7.2 oz) 75.2 kg (165 lb 12.6 oz)     Vital Signs with Ranges  Temp:  [97  F (36.1  C)-98.7  F (37.1  C)] 97  F (36.1  C)  Pulse:  [] 79  Heart Rate:  [] 86  Resp:  [11-32] 20  BP: ()/(43-90) 111/66  FiO2 (%):  [21 %] 21 %  SpO2:  [95 %-100 %] 100 %  I/O last 3 completed shifts:  In: 5384.27 [P.O.:1335; I.V.:4049.27]  Out: 3010 [Urine:2280; Chest Tube:730]     Appearance: Alert and appropriate, well developed, nontoxic, with moist mucous membranes.  HEENT: Head: Normocephalic and atraumatic. Eyes: PERRL, EOM grossly intact, conjunctivae and sclerae clear. Nose: Nares clear with no active discharge.  Mouth/Throat: No oral lesions, pharynx clear with no erythema or exudate.  Neck: Supple, resolving left cervical lymphadenopathy, improved left axillary lymphadenopathy  Pulmonary: No grunting, flaring, retractions or stridor. Good air entry, clear to auscultation bilaterally.   Cardiovascular: Regular rate and rhythm, normal S1 and S2, with no murmurs.  Normal symmetric peripheral pulses and brisk cap refill.  Abdominal: Normal bowel sounds, soft, nontender, nondistended, with no masses and no hepatosplenomegaly.  Neurologic: Alert and oriented, cranial nerves II-XII grossly intact, moving all extremities equally with grossly normal coordination.  Skin: No significant rashes, ecchymoses, or lacerations.  Extremities: Right antecubital double lumen PICC line, no swelling or tenderness of arm.  Left antecubital peripheral IV, site not tender or swollen.    Medications     dextrose 5% and 0.9% NaCl 3 mL/hr at 09/02/19 1444     - MEDICATION INSTRUCTIONS -       sodium chloride         acetaminophen  650 mg Oral Q6H     allopurinol  150 mg Oral TID     INTRATHECAL chemo - Cytarabine and/or methotrexate and/or Hydrocortisone    Intrathecal Once     DAUNOrubicin (CERUBIDINE) chemo infusion  25 mg/m2 (Treatment Plan Recorded) Intravenous Q7 Days     dexamethasone  5 mg/m2 (Treatment Plan Recorded) Intravenous Q12H     famotidine  20 mg Intravenous Q12H     heparin lock flush  2-4 mL Intracatheter Q24H     heparin lock flush  2-4 mL Intracatheter Q24H     [START ON 9/8/2019] INTRATHECAL chemo - Cytarabine and/or methotrexate and/or Hydrocortisone   Intrathecal Once     ondansetron  8 mg Intravenous Q6H     [START ON 9/8/2019] ondansetron  8 mg Intravenous Q6H     [START ON 9/4/2019] pegasparginase (ONCASPAR) infusion  2,500 Units/m2 (Treatment Plan Recorded) Intravenous Once     sodium chloride (PF)  3 mL Intracatheter Q8H     sulfamethoxazole-trimethoprim  1 tablet Oral Q Mon Tues BID     vinCRIStine (ONCOVIN) chemo infusion  2 mg Intravenous Q7 Days       Data   Results for orders placed or performed during the hospital encounter of 08/30/19 (from the past 24 hour(s))   Uric acid   Result Value Ref Range    Uric Acid Canceled, Test credited 3.5 - 7.2 mg/dL   Magnesium   Result Value Ref Range    Magnesium Canceled, Test credited 1.6 - 2.3 mg/dL   Phosphorus   Result Value Ref Range    Phosphorus Canceled, Test credited 2.5 - 4.5 mg/dL   Calcium ionized whole blood (Q6H)   Result Value Ref Range    Calcium Ionized Whole Blood 4.0 (L) 4.4 - 5.2 mg/dL   CBC with platelets differential   Result Value Ref Range    WBC 9.5 4.0 - 11.0 10e9/L    RBC Count 4.50 4.4 - 5.9 10e12/L    Hemoglobin 12.0 (L) 13.3 - 17.7 g/dL    Hematocrit 37.0 (L) 40.0 - 53.0 %    MCV 82 78 - 100 fl    MCH 26.7 26.5 - 33.0 pg    MCHC 32.4 31.5 - 36.5 g/dL    RDW 11.8 10.0 - 15.0 %    Platelet Count 398 150 - 450 10e9/L    Diff Method Automated Method     % Neutrophils 89.0 %    % Lymphocytes 5.9 %    % Monocytes 4.4 %    % Eosinophils 0.0 %    % Basophils 0.1 %    % Immature Granulocytes 0.6 %    Nucleated RBCs 0 0 /100    Absolute Neutrophil 8.5 (H) 1.6 - 8.3 10e9/L     Absolute Lymphocytes 0.6 (L) 0.8 - 5.3 10e9/L    Absolute Monocytes 0.4 0.0 - 1.3 10e9/L    Absolute Eosinophils 0.0 0.0 - 0.7 10e9/L    Absolute Basophils 0.0 0.0 - 0.2 10e9/L    Abs Immature Granulocytes 0.1 0 - 0.4 10e9/L    Absolute Nucleated RBC 0.0    Comprehensive metabolic panel   Result Value Ref Range    Sodium Canceled, Test credited 133 - 144 mmol/L    Potassium Canceled, Test credited 3.4 - 5.3 mmol/L    Chloride Canceled, Test credited 94 - 109 mmol/L    Carbon Dioxide Canceled, Test credited 20 - 32 mmol/L    Anion Gap Canceled, Test credited 6 - 17 mmol/L    Glucose Canceled, Test credited 70 - 99 mg/dL    Urea Nitrogen Canceled, Test credited 7 - 30 mg/dL    Creatinine Canceled, Test credited 0.66 - 1.25 mg/dL    GFR Estimate Canceled, Test credited >60 mL/min/[1.73_m2]    GFR Estimate If Black Canceled, Test credited >60 mL/min/[1.73_m2]    Calcium Canceled, Test credited 8.5 - 10.1 mg/dL    Bilirubin Total Canceled, Test credited 0.2 - 1.3 mg/dL    Albumin Canceled, Test credited 3.4 - 5.0 g/dL    Protein Total Canceled, Test credited 6.8 - 8.8 g/dL    Alkaline Phosphatase Canceled, Test credited 40 - 150 U/L    ALT Canceled, Test credited 0 - 70 U/L    AST Canceled, Test credited 0 - 45 U/L   Glucose whole blood   Result Value Ref Range    Glucose Canceled, Test credited 70 - 99 mg/dL   Comprehensive metabolic panel   Result Value Ref Range    Sodium 144 133 - 144 mmol/L    Potassium 3.8 3.4 - 5.3 mmol/L    Chloride 113 (H) 94 - 109 mmol/L    Carbon Dioxide 23 20 - 32 mmol/L    Anion Gap 8 3 - 14 mmol/L    Glucose 149 (H) 70 - 99 mg/dL    Urea Nitrogen 11 7 - 30 mg/dL    Creatinine 0.81 0.66 - 1.25 mg/dL    GFR Estimate >90 >60 mL/min/[1.73_m2]    GFR Estimate If Black >90 >60 mL/min/[1.73_m2]    Calcium 8.0 (L) 8.5 - 10.1 mg/dL    Bilirubin Total 0.3 0.2 - 1.3 mg/dL    Albumin 2.7 (L) 3.4 - 5.0 g/dL    Protein Total 6.2 (L) 6.8 - 8.8 g/dL    Alkaline Phosphatase 74 40 - 150 U/L    ALT 23 0 - 70  U/L    AST 8 0 - 45 U/L   Magnesium   Result Value Ref Range    Magnesium 2.3 1.6 - 2.3 mg/dL   Phosphorus   Result Value Ref Range    Phosphorus 4.7 (H) 2.5 - 4.5 mg/dL   Calcium ionized whole blood   Result Value Ref Range    Calcium Ionized Whole Blood 4.7 4.4 - 5.2 mg/dL   Uric acid   Result Value Ref Range    Uric Acid 0.5 (L) 3.5 - 7.2 mg/dL   PEDS Hem/Onc IP Consult: Patient to be seen: Routine within 24 hrs; Call back #: [id number]; initiating chemotherapy; Consultant may enter orders: No; Requesting provider? Attending physician    Heather Jacobson MD     9/1/2019  8:37 PM  [facility], [place]    Pediatric Hematology / Oncology Consultation     Date of Admission:  8/30/2019    Assessment & Plan   Lazaro Lund is a 19 year old male admitted on 8/30/2019 with   mediastinal mass, adenopathy, and pleural effusion (sampled at   OSH by flow and found to have neoplastic T cells) and   subsequently identified pericardial effusion with evidence of   tamponade physiology who is now status post left side   thoracentesis with chest tube placement (8/31), diagnostic LP   (8/31), and diagnostic bone marrow biopsy (9/1). He is being   transferred to the PICU post-op for close respiratory monitoring   given his airway compromise and to start therapy with high risk   for tumour lysis syndrome. In addition, he is at increased risk   for clotting given hypercoagulable state and compression of   vessels from mass.    Recommendations  -Staging: OSH chest CT in PAX; LP; BMB; AUS. We will follow   pending studies. Unable to lie flat for C/A/P CT scans at this   time.  -Plan to start induction therapy per [id number]. 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    Primary Care Physician   Rodriguez Montoya    Chief Complaint   T cell neoplasm    History is obtained from the patient and the patient's parent(s)    History of Present Illness   Lazaro Lund is a 19 year old male who presents with   mediastinal mass. His symptoms started 3 months ago with cough   that persisted. 1 month ago, he was seen by his PCP and   prescribed antibiotics. When he did not have improvement of   symptoms, he was seen on 8/20/19 again and an XR revealed pleural   effusion and mediastinal msas. He had a thoracentesis on 8/21   followed by a chest CT and repeat thoracentesis on 8/27. 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 His symptoms that started with cough progressed over   the past few weeks to having shortness of breath with lying down,   cough also worse with lying down, decreased appetite, and fatigue   from poor sleep quality. He did not have recurrent fevers, night   sweats, or weight loss. He has noticed tender swollen glands on   his left neck and left axilla. No headaches, back pain, abdominal   pain, abdominal distention, testicular swelling    Past Medical History    Born term  No chronic illnesses    Past Surgical History   Past surgical history reviewed with no previous surgeries   identified.    Immunization History   Immunization Status:  Delayed per [facility]    Prior to Admission Medications   Prior to Admission Medications   Prescriptions Last Dose Informant Patient Reported? Taking?   NO ACTIVE MEDICATIONS   Yes No    Sig: .      Facility-Administered Medications: None     Allergies   Allergies   Allergen Reactions     No Known Drug Allergies        Social History   I have updated and reviewed the following Social History   Narrative:   Pediatric History   Patient Guardian Status     Mother:  JHONATHAN RODRIGUEZ     Father:  AVNI RODRIGUEZ     Other Topics Concern     Not on file   Social History Narrative    Lives at home in [place] with Dad and Step-Mom. Biological   mother is involved in his life and accompanied him during this   hospitalization. Has 4 step-siblings and 1 biological sibling.   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 Has completed high school.       Family History   I have reviewed this patient's family history and updated it with   pertinent information if needed.   Family History   Problem Relation Age of Onset     No Known Problems Mother      No Known Problems Father      Asthma Brother      Thyroid Cancer Paternal Grandmother      Melanoma Paternal Aunt        Review of Systems   The 10 point Review of Systems is negative other than noted in   the HPI or here.     Physical Exam   Temp: 98  F (36.7  C) Temp src: Axillary BP: 113/67 Pulse: 103   Heart Rate: 96 Resp: 22 SpO2: 100 % O2 Device: None (Room air)   Oxygen Delivery: 2 LPM  Vital Signs with Ranges  Temp:  [96.8  F (36  C)-98.6  F (37  C)] 98  F (36.7  C)  Pulse:  [] 103  Heart Rate:  [] 96  Resp:  [17-28] 22  BP: ()/(58-91) 113/67  SpO2:  [92 %-100 %] 100 %  165 lbs 12.57 oz     Appearance: Alert and appropriate, well developed, nontoxic, with   moist mucous membranes.  HEENT: Head: Normocephalic and atraumatic. Eyes: PERRL, EOM   grossly intact, conjunctivae and sclerae clear. Nose: Nares clear   with no active discharge.  Mouth/Throat: No oral lesions, pharynx   clear with no erythema or exudate.  Neck: Supple, left side  mildly tender masses, with significant   cervical lymphadenopathy  Pulmonary: No grunting, flaring, retractions or stridor. Good air   entry, clear to auscultation on right, no wheezing, rales in left   base with inspiration and expiration.   Cardiovascular: Regular rate and rhythm, normal S1 and S2, with   no murmurs.  Normal symmetric peripheral pulses and brisk cap   refill.  Abdominal: Normal bowel sounds, soft, nontender, nondistended,   with no masses and no hepatosplenomegaly.  Neurologic: Alert and oriented, cranial nerves II-XII grossly   intact, moving all extremities equally with grossly normal   coordination and normal gait.  Extremities/Back: No deformity, no CVA tenderness.  Skin: No significant rashes, ecchymoses, or lacerations.    Data   Results for orders placed or performed during the hospital   encounter of 08/30/19 (from the past 24 hour(s))   Basic metabolic panel   Result Value Ref Range    Sodium 141 133 - 144 mmol/L    Potassium 3.3 (L) 3.4 - 5.3 mmol/L    Chloride 107 94 - 109 mmol/L    Carbon Dioxide 25 20 - 32 mmol/L    Anion Gap 9 3 - 14 mmol/L    Glucose 111 (H) 70 - 99 mg/dL    Urea Nitrogen 3 (L) 7 - 30 mg/dL    Creatinine 0.58 (L) 0.66 - 1.25 mg/dL    GFR Estimate >90 >60 mL/min/[1.73_m2]    GFR Estimate If Black >90 >60 mL/min/[1.73_m2]    Calcium 7.7 (L) 8.5 - 10.1 mg/dL   Phosphorus   Result Value Ref Range    Phosphorus 3.8 2.5 - 4.5 mg/dL   Magnesium   Result Value Ref Range    Magnesium 1.8 1.6 - 2.3 mg/dL   Uric acid   Result Value Ref Range    Uric Acid 3.0 (L) 3.5 - 7.2 mg/dL   Uric acid   Result Value Ref Range    Uric Acid <0.2 (L) 3.5 - 7.2 mg/dL   Albumin level   Result Value Ref Range    Albumin 2.7 (L) 3.4 - 5.0 g/dL   Magnesium   Result Value Ref Range    Magnesium 2.2 1.6 - 2.3 mg/dL   Phosphorus   Result Value Ref Range    Phosphorus 4.1 2.5 - 4.5 mg/dL   Basic metabolic panel   Result Value Ref Range    Sodium 140 133 - 144 mmol/L    Potassium 4.0 3.4 - 5.3 mmol/L     Chloride 109 94 - 109 mmol/L    Carbon Dioxide 25 20 - 32 mmol/L    Anion Gap 6 3 - 14 mmol/L    Glucose 181 (H) 70 - 99 mg/dL    Urea Nitrogen 6 (L) 7 - 30 mg/dL    Creatinine 0.59 (L) 0.66 - 1.25 mg/dL    GFR Estimate >90 >60 mL/min/[1.73_m2]    GFR Estimate If Black >90 >60 mL/min/[1.73_m2]    Calcium 8.0 (L) 8.5 - 10.1 mg/dL   XR Chest Port 1 View    Narrative    XR CHEST PORT 1 VW  9/1/2019 6:58 AM      HISTORY: Monitor mediastinal mass, pleural effusion with chest   tube in  place    COMPARISON: Previous day    FINDINGS:   Portable upright view of the chest. Right arm PICC tip projects   over  the high SVC. Left basilar chest tube is stable in position. No  significant pneumothorax. Trace bilateral pleural effusions. The  cardiac silhouette size is stable. Mediastinal mass is grossly  unchanged from yesterday's examination.      Impression    IMPRESSION:   Trace amount of pleural fluid bilaterally, left basilar chest   tube  stable in position.    FATOUMATA PINK MD   US Abdomen Complete Portable    Narrative    EXAMINATION: US ABDOMEN COMPLETE PORTABLE  9/1/2019 7:53 AM      CLINICAL HISTORY: evaluate for hepatosplenomegaly and   lymphadenopathy  in newly diagnosed lymphoma patient    COMPARISON: None available.        FINDINGS:  The liver is normal in contour and echogenicity. The liver   measures  16.3 cm in craniocaudal dimension. There is no intrahepatic or  extrahepatic biliary ductal dilatation. The common bile duct   measures  3 mm. The gallbladder is normal, without gallstones, wall   thickening,  or pericholecystic fluid.    The spleen measures maximally 11.6 cm and is normal in   appearance. The  visualized portions of the pancreas are normal in echogenicity.    The visualized upper abdominal aorta and inferior vena cava are  normal.      The kidneys are normal in position and demonstrate borderline  increased echogenicity. The right kidney measures 14.2 cm and the   left  kidney measures 12.8 cm.  There is no significant urinary tract  dilation. The urinary bladder is moderately distended with   significant  echogenic debris. The bladder wall is normal.    There is a small right pleural effusion.      Impression    IMPRESSION:   1. Liver size at the upper limits of normal. No splenomegaly.  2. Borderline echogenic renal parenchyma, which can be seen with  medical renal disease. Asymmetric nephromegaly on the right.  3. Significant amount of debris within the bladder. Recommend  correlation with urinalysis.  4. Small right pleural effusion.    I have personally reviewed the examination and initial   interpretation  and I agree with the findings.    FATOUMATA PINK MD   Uric acid   Result Value Ref Range    Uric Acid 0.2 (L) 3.5 - 7.2 mg/dL   Magnesium   Result Value Ref Range    Magnesium 2.2 1.6 - 2.3 mg/dL   Phosphorus   Result Value Ref Range    Phosphorus 4.6 (H) 2.5 - 4.5 mg/dL   Basic metabolic panel   Result Value Ref Range    Sodium 142 133 - 144 mmol/L    Potassium 3.9 3.4 - 5.3 mmol/L    Chloride 112 (H) 94 - 109 mmol/L    Carbon Dioxide 23 20 - 32 mmol/L    Anion Gap 7 3 - 14 mmol/L    Glucose 141 (H) 70 - 99 mg/dL    Urea Nitrogen 9 7 - 30 mg/dL    Creatinine 0.71 0.66 - 1.25 mg/dL    GFR Estimate >90 >60 mL/min/[1.73_m2]    GFR Estimate If Black >90 >60 mL/min/[1.73_m2]    Calcium 8.0 (L) 8.5 - 10.1 mg/dL   Echo Pediatric (TTE) Complete    Narrative    [id number]  [id number]  [id number]  [id number]^LE^CARMENCITA^KAHLIL                                                                     Study ID: [id number]                                                 [facility]   [address]                                                Phone: [phone]                                Pediatric  Echocardiogram  __________________________________________________________________  ___________  __     Name: PLACIDO RODRIGUEZ  Study Date: 2019 09:18 AM             Patient Location:   [facility]  MRN: [id number]                             Age: 19 yrs  : 2000                             BP: 109/73 mmHg  Gender: Male  Patient Class: Inpatient                    Height: 183 cm  Ordering Provider: CARMENCITA LUJAN             Weight: 75 kg                                              BSA: 2.0 m2  Performed By: Sirisha Bennett  Report approved by: Savanah Butler MD  Reason For Study: Other, Please Specify in Comments  __________________________________________________________________  ___________  __     ------CONCLUSIONS------  Normal intracardiac connections. There is a small to moderate   circumferential  pericardial effusion. The left and right ventricles have normal   chamber size,  wall thickness, and systolic function. The calculated single   plane left  ventricular ejection fraction from the 4 chamber view is 67%.   There is a large  anterior mediastinal mass causing compression of the left atrium,   right and  left pulmonary arteries, and SVC. The RPA has continous flow with   a mean  gradient is 6 mmHg with a peak gradient of 14 mmHg. The LPA has a   peak  gradient of 13 mmHg. There is moderate compression and flow   acceleration  through the mid level of the LA from the tumor. The mean gradient   through the  area of compression is 3 mmHg. Proximal portion of the SVC is   difficult to  visulize but the mean gradient is 5 mmHg with a peak gradient of   10 mmHg.  __________________________________________________________________  ___________  __        Technical information:  A complete two dimensional, MMODE, spectral and color Doppler   transthoracic  echocardiogram is performed. Images are obtained from   parasternal, apical,  subcostal and suprasternal notch views. The study quality is    adequate.  Technically difficult study due to poor acoustic windows. ECG   tracing shows  regular rhythm.     Segmental Anatomy:  There is normal atrial arrangement, with concordant   atrioventricular and  ventriculoarterial connections.     Systemic and pulmonary veins:  The systemic venous return is normal. Normal coronary sinus. The   inferior vena  cava is of normal caliber. Proximal portion of the SVC is   difficult to  visulize but the mean gradient is 5 mmHg with a peak gradient of   10 mmHg. The  pulmonary veins are not well visualized.     Atria and atrial septum:  Normal right atrial size. There is moderate compression and flow   acceleration  through the mid level of the LA from the tumor. The mean gradient   through the  area of compression is 3 mmHg. There is no atrial level shunting.        Atrioventricular valves:  The tricuspid valve is normal in appearance and motion. Trivial   tricuspid  valve insufficiency. The mitral valve is normal in appearance and   motion.  There is no mitral valve insufficiency. There is significant   decrease (>20%)  in the MV E-wave velocity with inspiration.     Ventricles and Ventricular Septum:  The left and right ventricles have normal chamber size, wall   thickness, and  systolic function. The calculated single plane left ventricular   ejection  fraction from the 4 chamber view is 67 %. There is no ventricular   level  shunting.     Outflow tracts:  Normal great artery relationship. There is unobstructed flow   through the right  ventricular outflow tract. The pulmonary valve motion is normal.   There is  normal flow across the pulmonary valve. Trivial pulmonary valve   insufficiency.  There is unobstructed flow through the left ventricular outflow   tract.  Tricuspid aortic valve with normal appearance and motion. There   is normal flow  across the aortic valve.     Great arteries:  The main pulmonary artery has normal appearance. There is   unobstructed flow  in  the main pulmonary artery. The pulmonary artery bifurcation is   normal. There  is signficiant compression of both the RPA and the LPA from the   tumor. The RPA  has continous flow with a mean gradient is 6 mmHg with a peak   gradient of 14  mmHg. The LPA has a peak gradient of 13 mmHg. Normal ascending   aorta. The  aortic arch appears normal. There is unobstructed antegrade flow   in the  ascending, transverse arch, descending thoracic and abdominal   aorta. There is  a left aortic arch with normal branching pattern. There is   significant  decrease (>20%) in the peak flow velocity in the abdominal aorta   with  inspiration.     Arterial Shunts:  The ductal region is not imaged with this study.     Coronaries:  The coronary arteries are not evaluated.        Effusions, catheters, cannulas and leads:  There is a large anterior mediastinaal mass. There is a moderate  circumferential pericardial effusion.     MMode/2D Measurements & Calculations  4 Chamber EF: 67.0 %                LVMI(BSA): 93.9 grams/m2  LVMI(Height): 35.9                  RWT(MM): 0.57        Doppler Measurements & Calculations  PA V2 max: 75.9 cm/sec               LPA max marialuisa: 153.1 cm/sec  PA max P.3 mmHg                  LPA max P.7 mmHg     desc Ao max marialuisa: 121.2 cm/sec  desc Ao max P.9 mmHg     [place] 2D Z-SCORE VALUES  Measurement NameValue  Z-ScorePredictedNormal Range  LPA diam(2D)    0.38 cm  LVLd apical(4ch)7.8 cm  LVLs apical(4ch)6.8 cm     [place] Z-Scores (Measurements & Calculations)  Measurement NameValue      Z-ScorePredictedNormal Range  IVSd(MM)        1.1 cm     0.37   1.0      0.70 - 1.31  IVSs(MM)        1.5 cm     0.69   1.4      1.00 - 1.75  LVIDd(MM)       4.4 cm     -2.0   5.2      4.4 - 5.9  LVIDs(MM)       2.8 cm     -1.6   3.4      2.6 - 4.1  LVPWd(MM)       1.3 cm     2.4    0.95     0.69 - 1.20  LVPWs(MM)       1.6 cm     0.25   1.6      1.2 - 1.9  LV mass(C)d(MM) 183.4 grams-0.09  186.6    126.1 -  276.2  FS(MM)          37.4 %     0.67   34.9     28.5 - 42.6           Report approved by: Jocy Flower 09/01/2019 03:05 PM      Leukemia Lymphoma Evaluation Non CSF   Result Value Ref Range    Copath Report       Patient Name: PLACIDO RODRIGUEZ  MR#: [id number]  Specimen #: [id number]  Collected: 9/1/2019 13:29  Received: 9/1/2019 15:35  Reported: 9/1/2019 18:55  Ordering Phy(s): MARC MADRID    For improved result formatting, select 'View Enhanced Report   Format' under   Linked Documents section.  _________________________________________    SPECIMEN(S):  Bone marrow, left    INTERPRETATION:  Bone marrow, left:       No definitive involvement by T-ALL, very rare T cell   precursors   (0.01%)       No increase in myeloid blasts and no abnormal myeloid blast   population       See comment    COMMENT:  There is no definitive evidence for T-ALL within the limits of   sensitivity   of this assay. There are very rare  events with immunophenotype similar to the abnormal T   lymphoblasts seen in   concurrent pleural fluid specimen  (to be reported separately as [id number]), however they do not   form   discrete population and are very few, below  the limit of  sensitivity of this assay. At such a low frequenc y   they are   difficult to distinguish from rare  normal T/NK  precursors. These findings are no definitive for   involvement   by T-ALL, correlation with  morphologic and cytogenetic findings is required for final   interpretation.    RESULTS:  Percentages reported below are based on the total number of CD45   positive   viable leukocytes. If applicable,  percentage of plasma cells is from total viable nucleated cells.    0.01% (only 17 events)  immature T/NK precursors with the   following   immunophenotype:  Positive for CD2, CD4 (partial), CD7, CD34 (partial), CD38, and   HLA-DR  Negative for CD8, CD13, CD14, CD19, CD33, CD56, CD64  Resident/Fellow Review by:  Dr. Floresita Goodwin    A resident/fellow in  an [employer]CGME accredited training program was   involved in   the selection of testing, review of  flow scattergrams, and/or interpretation of this case. I, as the   senior   physician, attest that I: (i)  confirmed appropriate testing, (ii) examined the relevant flow   scattergrams for the specimen(s); an d (ii)  rendered or confirmed the interpretation(s).    ANTIBODIES:  Four, eight and ten color analyses are performed for the   following   markers: CD1a, CD2, surface and cytoplasmic  CD3, CD4, CD5, CD7, CD8, CD10, CD11b, CD13, CD14, CD15, CD16,   CD19, CD20,   CD22, CD33, CD34, CD36, CD38, CD45,  CD56, CD58, CD61, CD64, , glycophorin A, HLA-DR, cytoplasmic   CD79a,   cytoplasmic myeloperoxidase, and  nuclear terminal deoxynucleotidyl transferase (TdT). Cells are   gated to   isolate populations (CD45 versus side  scatter and forward scatter versus side scatter), to exclude   debris   (forward scatter versus side scatter) and  to exclude cell doublets (forward scatter height versus forward   scatter   width and side scatter height versus  side scatter width). Forward scatter varies with cell size. Side   scatter   varies with the amount of cytoplasmic  granules. Intensity for CD45 usually increases as hematolymphoid   cells   mature. The analytic sensitivity of  this assay to detect abnormal B-l ymphoblasts as rare flow events   is 0.01%.    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    Electronically signed out by:    Prince Reynoso M.D.,Physicians    This test was developed and its performance characteristics   determined by   [facility]. It has not been cleared   or   approved by the US Food and Drug  Administration.  FDA does not require this test to go through    premarket   FDA review. This test is used for  clinical purposes and should not be regarded as investigational   or for   research.  This laboratory is certified  under the Clinical Laboratory Improvement Amendments (CLIA) as   qualified   to perform high complexity clinical  laboratory testing.    CPT Codes:  A: 43118-01-DNZC72(29), 98526-BO, 37734-EHER>15, 76731-83-BZVD61    TESTING LAB  LOCATION:  [address] [address] [phone]    COLLECTION SITE:  Client:  [facility]  Location:  UR3 (B)     Uric acid   Result Value Ref Range    Uric Acid Canceled, Test credited 3.5 - 7.2 mg/dL   Magnesium   Result Value Ref Range    Magnesium Canceled, Test credited 1.6 - 2.3 mg/dL   Phosphorus   Result Value Ref Range    Phosphorus Canceled, Test credited 2.5 - 4.5 mg/dL   Calcium ionized whole blood (Q6H)   Result Value Ref Range    Calcium Ionized Whole Blood 4.0 (L) 4.4 - 5.2 mg/dL   CBC with platelets differential   Result Value Ref Range    WBC 9.5 4.0 - 11.0 10e9/L    RBC Count 4.50 4.4 - 5.9 10e12/L    Hemoglobin 12.0 (L) 13.3 - 17.7 g/dL    Hematocrit 37.0 (L) 40.0 - 53.0 %    MCV 82 78 - 100 fl    MCH 26.7 26.5 - 33.0 pg    MCHC 32.4 31.5 - 36.5 g/dL    RDW 11.8 10.0 - 15.0 %    Platelet Count 398 150 - 450 10e9/L    Diff Method Automated Method     % Neutrophils 89.0 %    % Lymphocytes 5.9 %    % Monocytes 4.4 %    % Eosinophils 0.0 %    % Basophils 0.1 %    % Immature Granulocytes 0.6 %    Nucleated RBCs 0 0 /100    Absolute Neutrophil 8.5 (H) 1.6 - 8.3 10e9/L    Absolute Lymphocytes 0.6 (L) 0.8 - 5.3 10e9/L    Absolute Monocytes 0.4 0.0 - 1.3 10e9/L    Absolute Eosinophils 0.0 0.0 - 0.7 10e9/L    Absolute Basophils 0.0 0.0 - 0.2 10e9/L    Abs Immature Granulocytes 0.1 0 - 0.4 10e9/L    Absolute Nucleated RBC 0.0    Comprehensive metabolic panel   Result Value Ref Range    Sodium  Canceled, Test credited 133 - 144 mmol/L    Potassium Canceled, Test credited 3.4 - 5.3 mmol/L    Chloride Canceled, Test credited 94 - 109 mmol/L    Carbon Dioxide Canceled, Test credited 20 - 32 mmol/L    Anion Gap Canceled, Test credited 6 - 17 mmol/L    Glucose Canceled, Test credited 70 - 99 mg/dL    Urea Nitrogen Canceled, Test credited 7 - 30 mg/dL    Creatinine Canceled, Test credited 0.66 - 1.25 mg/dL    GFR Estimate Canceled, Test credited >60 mL/min/[1.73_m2]    GFR Estimate If Black Canceled, Test credited >60   mL/min/[1.73_m2]    Calcium Canceled, Test credited 8.5 - 10.1 mg/dL    Bilirubin Total Canceled, Test credited 0.2 - 1.3 mg/dL    Albumin Canceled, Test credited 3.4 - 5.0 g/dL    Protein Total Canceled, Test credited 6.8 - 8.8 g/dL    Alkaline Phosphatase Canceled, Test credited 40 - 150 U/L    ALT Canceled, Test credited 0 - 70 U/L    AST Canceled, Test credited 0 - 45 U/L   Glucose whole blood   Result Value Ref Range    Glucose Canceled, Test credited 70 - 99 mg/dL   Comprehensive metabolic panel   Result Value Ref Range    Sodium 144 133 - 144 mmol/L    Potassium 3.8 3.4 - 5.3 mmol/L    Chloride 113 (H) 94 - 109 mmol/L    Carbon Dioxide 23 20 - 32 mmol/L   [Narrative was truncated due to length]   Uric acid   Result Value Ref Range    Uric Acid 0.3 (L) 3.5 - 7.2 mg/dL   Magnesium   Result Value Ref Range    Magnesium 2.2 1.6 - 2.3 mg/dL   Phosphorus   Result Value Ref Range    Phosphorus 3.3 2.5 - 4.5 mg/dL   Basic metabolic panel   Result Value Ref Range    Sodium 144 133 - 144 mmol/L    Potassium 3.7 3.4 - 5.3 mmol/L    Chloride 114 (H) 94 - 109 mmol/L    Carbon Dioxide 24 20 - 32 mmol/L    Anion Gap 6 3 - 14 mmol/L    Glucose 133 (H) 70 - 99 mg/dL    Urea Nitrogen 12 7 - 30 mg/dL    Creatinine 0.83 0.66 - 1.25 mg/dL    GFR Estimate >90 >60 mL/min/[1.73_m2]    GFR Estimate If Black >90 >60 mL/min/[1.73_m2]    Calcium 7.5 (L) 8.5 - 10.1 mg/dL   Calcium ionized whole blood (Q6H)   Result  Value Ref Range    Calcium Ionized Whole Blood 4.5 4.4 - 5.2 mg/dL   Uric acid   Result Value Ref Range    Uric Acid 0.5 (L) 3.5 - 7.2 mg/dL   Magnesium   Result Value Ref Range    Magnesium 2.4 (H) 1.6 - 2.3 mg/dL   Phosphorus   Result Value Ref Range    Phosphorus 4.0 2.5 - 4.5 mg/dL   Basic metabolic panel   Result Value Ref Range    Sodium 144 133 - 144 mmol/L    Potassium 4.0 3.4 - 5.3 mmol/L    Chloride 115 (H) 94 - 109 mmol/L    Carbon Dioxide 22 20 - 32 mmol/L    Anion Gap 7 3 - 14 mmol/L    Glucose 159 (H) 70 - 99 mg/dL    Urea Nitrogen 13 7 - 30 mg/dL    Creatinine 0.82 0.66 - 1.25 mg/dL    GFR Estimate >90 >60 mL/min/[1.73_m2]    GFR Estimate If Black >90 >60 mL/min/[1.73_m2]    Calcium 7.2 (L) 8.5 - 10.1 mg/dL   Calcium ionized whole blood (Q6H)   Result Value Ref Range    Calcium Ionized Whole Blood 4.4 4.4 - 5.2 mg/dL   Albumin level   Result Value Ref Range    Albumin 2.4 (L) 3.4 - 5.0 g/dL   XR Chest Port 1 View    Narrative    XR CHEST PORT 1 VW  9/2/2019 8:28 AM      HISTORY: mediastinal mass    COMPARISON: Previous day    FINDINGS:   2 portable upright views of the chest obtained. Right arm PICC stable  in position projecting over the high SVC. Left chest tube in has  migrated laterally. There is a tiny left apical pneumothorax. There is  a trace amount of residual pleural fluid.    The cardiac silhouette size is normal. The mediastinal mass is similar  in size from comparison examination. No new focal pulmonary opacity.      Impression    IMPRESSION:   Chest tube has migrated somewhat laterally. Tiny left apical  pneumothorax. Trace residual pleural fluid.    FATOUMATA PINK MD   Echo Pediatric (TTE) Complete    Narrative    [id number]  [id number]  [id number]  [id number]^SULY                                                                   Study ID: [id number]                                                 [facility]                                                   [address]                                                [place] [address]                                                Phone: [phone]                                Pediatric Echocardiogram  _____________________________________________________________________________  __     Name: PLACIDO RODRIGUEZ  Study Date: 2019 09:36 AM               Patient Location: URU3  MRN: [id number]                               Age: 19 yrs  : 2000                               BP: 115/70 mmHg  Gender: Male  Patient Class: Inpatient                      Height: 72 in  Ordering Provider: SULY PLUNKETT             Weight: 166 lb                                                BSA: 2.0 m2  Performed By: Sirisha Bennett  Report approved by: Savanah Butler MD  Reason For Study: Other Cardiac Device In Situ  _____________________________________________________________________________  __     ------CONCLUSIONS------  Normal intracardiac connections. There is a small circumferential pericardial  effusion. There is collapse of the right ventricular free wall in  diastole.There is still a decrease in peak flow velocity in the abdominal  aorta with inspiration but is less prominent compared to previous echoes.The  left and right ventricles have normal chamber size, wall thickness, and  systolic function. The calculated single plane left ventricular ejection  fraction from the 4 chamber view is 68%. There is a large anterior mediastinal  mass causing compression of the left atrium, right and left pulmonary  arteries, and SVC. The RPA has continous flow with a mean gradient is 8 mmHg  with a peak gradient of 23 mmHg. The LPA has a peak gradient of 12 mmHg. There  is moderate compression and flow acceleration through the mid level of the LA  causing a gradient with a mean of 4-6 mmHg. Proximal portion of the SVC is  difficult to visulize but there is  moderate turbulance with a mean gradient is  7 mmHg.     Compared to the previous echo, the pericardial effusion is drecreased in size.              _____________________________________________________________________________  __        Technical information:  A complete two dimensional, MMODE, spectral and color Doppler transthoracic  echocardiogram is performed. Images are obtained from parasternal, apical,  subcostal and suprasternal notch views. The study quality is adequate.  Technically difficult study due to poor acoustic windows. ECG tracing shows  regular rhythm.     Segmental Anatomy:  There is normal atrial arrangement, with concordant atrioventricular and  ventriculoarterial connections.     Systemic and pulmonary veins:  The systemic venous return is normal. Normal coronary sinus. The inferior vena  cava is of normal caliber. Proximal portion of the SVC is difficult to  visulize but the mean gradient is 7 mmHg . Color flow demonstrates flow from  two pulmonary veins entering the left atrium.     Atria and atrial septum:  Normal right atrial size. There is moderate compression and flow acceleration  through the mid level of the LA from the tumor. The mean gradient through the  area of compression is 4 mmHg. The atrial septum is not well visualized.        Atrioventricular valves:  The tricuspid valve is normal in appearance and motion. Trivial tricuspid  valve insufficiency. Estimated right ventricular systolic pressure is at least  45 mmHg plus right atrial pressure. The mitral valve is normal in appearance  and motion. There is no mitral valve insufficiency.     Ventricles and Ventricular Septum:  The left and right ventricles have normal chamber size, wall thickness, and  systolic function. The calculated single plane left ventricular ejection  fraction from the 4 chamber view is 68 %. There is no ventricular level  shunting.     Outflow tracts:  Normal great artery relationship. There is  unobstructed flow through the right  ventricular outflow tract. The pulmonary valve motion is normal. There is  normal flow across the pulmonary valve. Trivial pulmonary valve insufficiency.  There is unobstructed flow through the left ventricular outflow tract.  Tricuspid aortic valve with normal appearance and motion. There is normal flow  across the aortic valve.     Great arteries:  The main pulmonary artery has normal appearance. There is unobstructed flow in  the main pulmonary artery. The pulmonary artery bifurcation is normal. There  is signficiant compression of both the RPA and the LPA from the tumor. The RPA  has continous flow with a mean gradient is 8 mmHg with a peak gradient of 23  mmHg. The LPA has a peak gradient of 14 mmHg. Normal ascending aorta. The  aortic arch appears normal. There is unobstructed antegrade flow in the  ascending, transverse arch, descending thoracic and abdominal aorta. There is  still a variation in flow in the peak flow velocity in the abdominal aorta  with inspiration- but compared to previous echos it is getting better.     Arterial Shunts:  The ductal region is not imaged with this study.     Coronaries:  The coronary arteries are not evaluated.        Effusions, catheters, cannulas and leads:  There is a large anterior mediastinaal mass. There is a small circumferential  pericardial effusion. There is collapse of the right ventricular free wall in  diastole.     MMode/2D Measurements & Calculations  4 Chamber EF: 68.0 %                LVMI(BSA): 90.1 grams/m2  LVMI(Height): 34.5                  RWT(MM): 0.40        Doppler Measurements & Calculations  MV mean P.8 mmHg                  PA V2 max: 82.1 cm/sec                                        PA max P.7 mmHg  TR max marialuisa: 331.8 cm/sec              LPA max marialuisa: 175.2 cm/sec  TR max P.0 mmHg                  LPA max P.3 mmHg                                        RPA max marialuisa: 241.1 cm/sec                                         RPA max P.2 mmHg                                        RPA mean P.9 mmHg     [place] 2D Z-SCORE VALUES  Measurement NameValue Z-ScorePredictedNormal Range  LVLd apical(4ch)8.4 cm  LVLs apical(4ch)6.3 cm     [place] Z-Scores (Measurements & Calculations)  Measurement NameValue      Z-ScorePredictedNormal Range  IVSd(MM)        0.99 cm    -0.09  1.0      0.70 - 1.31  IVSs(MM)        1.3 cm     -0.16  1.4      1.00 - 1.75  LVIDd(MM)       4.9 cm     -0.66  5.2      4.4 - 5.9  LVIDs(MM)       3.1 cm     -0.77  3.4      2.6 - 4.1  LVPWd(MM)       0.99 cm    0.37   0.95     0.69 - 1.20  LVPWs(MM)       1.1 cm     -2.3   1.6      1.2 - 1.9  LV mass(C)d(MM) 176.1 grams-0.29  186.6    126.1 - 276.2  FS(MM)          37.7 %     0.77   34.9     28.5 - 42.6           Report approved by: Jocy Flower 2019 10:50 AM      Magnesium   Result Value Ref Range    Magnesium 2.4 (H) 1.6 - 2.3 mg/dL   Phosphorus   Result Value Ref Range    Phosphorus 3.3 2.5 - 4.5 mg/dL   Basic metabolic panel   Result Value Ref Range    Sodium 143 133 - 144 mmol/L    Potassium 3.9 3.4 - 5.3 mmol/L    Chloride 114 (H) 94 - 109 mmol/L    Carbon Dioxide 23 20 - 32 mmol/L    Anion Gap 6 3 - 14 mmol/L    Glucose 130 (H) 70 - 99 mg/dL    Urea Nitrogen 13 7 - 30 mg/dL    Creatinine 0.77 0.66 - 1.25 mg/dL    GFR Estimate >90 >60 mL/min/[1.73_m2]    GFR Estimate If Black >90 >60 mL/min/[1.73_m2]    Calcium 7.4 (L) 8.5 - 10.1 mg/dL   Calcium ionized whole blood (Q6H)   Result Value Ref Range    Calcium Ionized Whole Blood 4.6 4.4 - 5.2 mg/dL     I personally saw and examined the patient with the fellow, Dr. Fischer as above.  I personally reviewed the laboratory and imaging results as above. I agree with the assessment and plan as above.  Heather Lopez, MSc., MD

## 2019-09-02 NOTE — PLAN OF CARE
Patient has not exhibited any adverse effects from first chemo dose this evening. Chest tube output has been minimal when patient is still in bed, about 260 mls this shift. VSS on RA all shift with no periodic increases in WOB. Stable labs. Patient has expressed hope and uses humor and positive family interactions to cope. Parents at bedside and aware of POC.

## 2019-09-02 NOTE — PROCEDURES
Procedure: Bone marrrow aspirate and biopsy  Performed by: Trav Rose., MD    In the operating room under conscious sedation the patient was identified by name recognition and placed in the right lateral position with head of the bed at approximately 30' for the entire procedure.  The left posterior ileac crest was prepped and draped in a sterile manner.  The area over the most prominent left posterior iliac crest was infiltrated with approximately 3 ml of 1% lidocaine without epinephrine.  Following this a bone marrow biopsy needle was inserted and due to a mal-functioning biopsy needle the first 2 attempts failed to yield a biopsy specimen.  A new needle was obtained and a biopsy was easily obtained and sent for evaluation. The needle was changed and an aspirate needle was inserted and samples obtained for morphology, flow cytometry, cytogenetics and molecular studies.  A pressure dressing was applied.  A total of 15 ml blood loss. Patient tolerated the procedure well and was returned to the PICU post procedure.    Trav Rose, MD  Pediatric Oncology

## 2019-09-02 NOTE — PROGRESS NOTES
[facility] [place]    Pediatric Intensive Care Transfer Accept Note  Date of Service (when I saw the patient): 09/02/2019     Assessment & Plan   Lazaro Lund is a 19 year old male with acute onset cough recently found to have anterior mediastinal mass and malignant left-sided pleural effusion concerning for lymphoma versus leukemia. 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 He was admitted to [facility] oncology service yesterday, and underwent LP with intrathecal chemotherapy, PICC placement, and chest tube placement this evening 8/31. He requires continued close monitoring of respiratory status and tumor lysis labs.     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 Echo 9/2: large anterior mediastinal mass causing compression of the left atrium, right and left pulmonary arteries, and SVC.  #[id number] Protocol: 9/1 Lazaro had intrathecal LP with CSF studies, chest tube placement, bone marrow biopsy, and PICC line placed. Abdominal US (9/1) showed no HSM or LAD, but will need better imaging once can lie flat    -Heme/onc consult  -Dexamethasone 9.75 mg IV q12h (start 9/1, Day 1-14)  -Daunorubicin and Vincristine (9/1, Day 1 and 8)  -Methotrexate IT (9/8)  -Pegasparginase (9/4)  -CBC every other day (9/4)  -Coags (PTT,INR,Fibrinogen) 9/4      CV  #Pericardial effusion: shown on Echo 9/2.  #Orthopnea  #Tamponade Physiology: Echo 9/2: collapse of RV wall during diastole. decrease in peak flow velocity in the abdominal aorta with inspiration but improved from previous echo.    Gradients created by mass:    9/1 9/2   RPA Mean 6, peak 14 mean 8, peak  23   LPA Peak 13 peak 12   LA 3 4-6   SVC Mean 5, peak of 10 7 with moderate turbulence       -Echo Daily to follow gradients created by compression   -Cardiology consulted  -Do NOT lay flat, head of bed > 60  degrees at all times  -Adult telemetry    #SVC Syndrome Risk   - clinically monitor for face and neck swelling, chest pain, shortness of breath, headache, blurry vision     FEN  #At risk for tumor lysis syndrome  -BMP, mag, phos, uric acid q6h  -Daily albumnin  -Allopurinol 150 mg TID  -s/p Rasburicase 6 mg 8/31  -D5 NS @ 170/hr (1.5 MIVF), do not add K+  -Liquid only diet  - will hold off on solid foods until improvement in gradients on Echo     ID  -Bactrim ppx M/Tu    RESP  #Left pleural effusion, s/p chest tube placement (9/1)  -Oxygen NC wean as tolerated  -Chest tube to suction -20  -Monitor output     GI  -IV famotidine 20 mg BID for gut protection while on steroids    ENT  #Dysphagia  -Liquid only diet  -NO SOLIDS until gradients on Echo improve  -Consider swallow study after tumor has decreased in size    NEURO  -Benadryl q6h IV PRN  -Melatonin 3 mg at bedtime PRN  -Scheduled Tylenol 650 mg PO q6h scheduled     Access: PICC  Dispo: >7 days given work up and stabilization of respiratory status.    Patient seen and discussed with pediatric attending, Dr. Trevizo.    Ellen Garcia MD  Pediatric Resident-PGY2  Pager #: [phone]    Pediatric Critical Care Attestation:     Patient is admitted to the ICU and is receiving hospital level cares for t-cell lymphoma and mediastinal mass with cardiac and airway/esophageal impingement. In the ICU for careful monitoring due to risk of cardiovascular collapse from impingement. His echo is stable. He is generally feeling better.  I personally examined and evaluated the patient today, and have discussed plans with the resident/NP/fellow and nurse. All physician orders and treatments were placed at my direction and agree with the findings and plan of care as documented in the note  Patient's weight today: 165 lbs 12.57 oz    Treatment plan today is continue liquid diet today, getting chemo/steroids this week.     The above plans and care have been discussed with heme onc,  Lazaro, parents.  I spent a total of  35  minutes providing hospital care at the bedside and on the unit, evaluating the patient, directing care and reviewing laboratory values and radiologic reports for this patient.    Amador Trevizo MD   PICU Attending      Interval History   Overnight Lazaro was stable. He remained stable on room air. Pain was well controlled on scheduled Tylenol this morning. He had a few sips of coke and a smoothie last night and didn't endorse any difficulty swallowing. This morning, Lazaro was engaged on rounds and asked good questions. Mom and dad were updated on the POC.      Physical Exam   Temp: 97  F (36.1  C) Temp src: Oral BP: 111/66 Pulse: 79 Heart Rate: 86 Resp: 20 SpO2: 100 % O2 Device: None (Room air) Oxygen Delivery: 4 LPM  Vitals:    08/30/19 1523 08/30/19 1811   Weight: 75.5 kg (166 lb 7.2 oz) 75.2 kg (165 lb 12.6 oz)     Vital Signs with Ranges  Temp:  [97  F (36.1  C)-98.7  F (37.1  C)] 97  F (36.1  C)  Pulse:  [] 79  Heart Rate:  [] 86  Resp:  [11-32] 20  BP: ()/(43-90) 111/66  FiO2 (%):  [21 %] 21 %  SpO2:  [95 %-100 %] 100 %  I/O last 3 completed shifts:  In: 5384.27 [P.O.:1335; I.V.:4049.27]  Out: 3010 [Urine:2280; Chest Tube:730]    Appearance: Alert and appropriate, well developed, nontoxic.  HEENT: Head: Atraumatic.   Eyes: PERRL, EOM grossly intact, conjunctivae and sclerae clear. Nose: Nares clear with no active discharge. Mouth/Throat: Moist mucous membranes. No oral lesions, pharynx clear with no erythema or exudate.    Lymph: shoddy left cervical LAD, palpable left axillary lymph nodes   Respiratory: Mild increased work of breathing. Moderate air movement bilaterally. No rales, rhonchi, or wheezing. No stridor on exam.   Cardiovascular: Regular rate and rhythm, normal S1 and S2, with no murmurs. Heart sounds distant. Holosystolic murmur heard best in the right sternal border. Normal symmetric peripheral pulses and brisk cap refill.   Abdominal:   Soft, nontender, non-distended. No masses, but in sitting position, so hard to assess.  Neurologic: Alert and oriented, right side of mouth is drooping. Right eye wider than left. Eyebrow lift symmetric bilaterally. Smile symmetric bilaterally. Tongue protrudes symmetrically and able to move bilaterally.   Extremities/Back: No deformity.  Skin: No significant rashes, ecchymoses, or lacerations.     Medications     dextrose 5% and 0.9% NaCl 3 mL/hr at 09/02/19 1444     - MEDICATION INSTRUCTIONS -       sodium chloride         acetaminophen  650 mg Oral Q6H     allopurinol  150 mg Oral TID     INTRATHECAL chemo - Cytarabine and/or methotrexate and/or Hydrocortisone   Intrathecal Once     DAUNOrubicin (CERUBIDINE) chemo infusion  25 mg/m2 (Treatment Plan Recorded) Intravenous Q7 Days     dexamethasone  5 mg/m2 (Treatment Plan Recorded) Intravenous Q12H     famotidine  20 mg Intravenous Q12H     heparin lock flush  2-4 mL Intracatheter Q24H     heparin lock flush  2-4 mL Intracatheter Q24H     [START ON 9/8/2019] INTRATHECAL chemo - Cytarabine and/or methotrexate and/or Hydrocortisone   Intrathecal Once     ondansetron  8 mg Intravenous Q6H     [START ON 9/8/2019] ondansetron  8 mg Intravenous Q6H     [START ON 9/4/2019] pegasparginase (ONCASPAR) infusion  2,500 Units/m2 (Treatment Plan Recorded) Intravenous Once     sodium chloride (PF)  3 mL Intracatheter Q8H     sulfamethoxazole-trimethoprim  1 tablet Oral Q Mon Tues BID     vinCRIStine (ONCOVIN) chemo infusion  2 mg Intravenous Q7 Days       Data   Results for orders placed or performed during the hospital encounter of 08/30/19 (from the past 24 hour(s))   Uric acid   Result Value Ref Range    Uric Acid Canceled, Test credited 3.5 - 7.2 mg/dL   Magnesium   Result Value Ref Range    Magnesium Canceled, Test credited 1.6 - 2.3 mg/dL   Phosphorus   Result Value Ref Range    Phosphorus Canceled, Test credited 2.5 - 4.5 mg/dL   Calcium ionized whole blood (Q6H)   Result  Value Ref Range    Calcium Ionized Whole Blood 4.0 (L) 4.4 - 5.2 mg/dL   CBC with platelets differential   Result Value Ref Range    WBC 9.5 4.0 - 11.0 10e9/L    RBC Count 4.50 4.4 - 5.9 10e12/L    Hemoglobin 12.0 (L) 13.3 - 17.7 g/dL    Hematocrit 37.0 (L) 40.0 - 53.0 %    MCV 82 78 - 100 fl    MCH 26.7 26.5 - 33.0 pg    MCHC 32.4 31.5 - 36.5 g/dL    RDW 11.8 10.0 - 15.0 %    Platelet Count 398 150 - 450 10e9/L    Diff Method Automated Method     % Neutrophils 89.0 %    % Lymphocytes 5.9 %    % Monocytes 4.4 %    % Eosinophils 0.0 %    % Basophils 0.1 %    % Immature Granulocytes 0.6 %    Nucleated RBCs 0 0 /100    Absolute Neutrophil 8.5 (H) 1.6 - 8.3 10e9/L    Absolute Lymphocytes 0.6 (L) 0.8 - 5.3 10e9/L    Absolute Monocytes 0.4 0.0 - 1.3 10e9/L    Absolute Eosinophils 0.0 0.0 - 0.7 10e9/L    Absolute Basophils 0.0 0.0 - 0.2 10e9/L    Abs Immature Granulocytes 0.1 0 - 0.4 10e9/L    Absolute Nucleated RBC 0.0    Comprehensive metabolic panel   Result Value Ref Range    Sodium Canceled, Test credited 133 - 144 mmol/L    Potassium Canceled, Test credited 3.4 - 5.3 mmol/L    Chloride Canceled, Test credited 94 - 109 mmol/L    Carbon Dioxide Canceled, Test credited 20 - 32 mmol/L    Anion Gap Canceled, Test credited 6 - 17 mmol/L    Glucose Canceled, Test credited 70 - 99 mg/dL    Urea Nitrogen Canceled, Test credited 7 - 30 mg/dL    Creatinine Canceled, Test credited 0.66 - 1.25 mg/dL    GFR Estimate Canceled, Test credited >60 mL/min/[1.73_m2]    GFR Estimate If Black Canceled, Test credited >60 mL/min/[1.73_m2]    Calcium Canceled, Test credited 8.5 - 10.1 mg/dL    Bilirubin Total Canceled, Test credited 0.2 - 1.3 mg/dL    Albumin Canceled, Test credited 3.4 - 5.0 g/dL    Protein Total Canceled, Test credited 6.8 - 8.8 g/dL    Alkaline Phosphatase Canceled, Test credited 40 - 150 U/L    ALT Canceled, Test credited 0 - 70 U/L    AST Canceled, Test credited 0 - 45 U/L   Glucose whole blood   Result Value Ref Range     Glucose Canceled, Test credited 70 - 99 mg/dL   Comprehensive metabolic panel   Result Value Ref Range    Sodium 144 133 - 144 mmol/L    Potassium 3.8 3.4 - 5.3 mmol/L    Chloride 113 (H) 94 - 109 mmol/L    Carbon Dioxide 23 20 - 32 mmol/L    Anion Gap 8 3 - 14 mmol/L    Glucose 149 (H) 70 - 99 mg/dL    Urea Nitrogen 11 7 - 30 mg/dL    Creatinine 0.81 0.66 - 1.25 mg/dL    GFR Estimate >90 >60 mL/min/[1.73_m2]    GFR Estimate If Black >90 >60 mL/min/[1.73_m2]    Calcium 8.0 (L) 8.5 - 10.1 mg/dL    Bilirubin Total 0.3 0.2 - 1.3 mg/dL    Albumin 2.7 (L) 3.4 - 5.0 g/dL    Protein Total 6.2 (L) 6.8 - 8.8 g/dL    Alkaline Phosphatase 74 40 - 150 U/L    ALT 23 0 - 70 U/L    AST 8 0 - 45 U/L   Magnesium   Result Value Ref Range    Magnesium 2.3 1.6 - 2.3 mg/dL   Phosphorus   Result Value Ref Range    Phosphorus 4.7 (H) 2.5 - 4.5 mg/dL   Calcium ionized whole blood   Result Value Ref Range    Calcium Ionized Whole Blood 4.7 4.4 - 5.2 mg/dL   Uric acid   Result Value Ref Range    Uric Acid 0.5 (L) 3.5 - 7.2 mg/dL   PEDS Hem/Onc IP Consult: Patient to be seen: Routine within 24 hrs; Call back #: [phone]; initiating chemotherapy; Consultant may enter orders: No; Requesting provider? Attending physician    Heather Jacobson MD     9/1/2019  8:37 PM  [facility]    Pediatric Hematology / Oncology Consultation     Date of Admission:  8/30/2019    Assessment & Plan   Lazaro Lund is a 19 year old male admitted on 8/30/2019 with   mediastinal mass, adenopathy, and pleural effusion (sampled at   OSH by flow and found to have neoplastic T cells) and   subsequently identified pericardial effusion with evidence of   tamponade physiology who is now status post left side   thoracentesis with chest tube placement (8/31), diagnostic LP   (8/31), and diagnostic bone marrow biopsy (9/1). He is being   transferred to the PICU post-op for close respiratory monitoring   given his airway  compromise and to start therapy with high risk   for tumour lysis syndrome. In addition, he is at increased risk   for clotting given hypercoagulable state and compression of   vessels from mass.    Recommendations  -Staging: OSH chest CT in PAX; LP; BMB; AUS. We will follow   pending studies. Unable to lie flat for C/A/P CT scans at this   time.  -Plan to start induction therapy per [id number]. Note:   Dexamethasone started on 8/31 pending logistical ability to   perform bone marrow biopsy  -Daily chest x-rays; chest tube to suction  -TLS monitoring: Q6H BMP with Mg, Phos,iCa and uric acid  -TLS prophylaxis: 1.5x mIVF (ensure no potassium containing   fluids), 150mg allopurinol TID, and now s/p one time dose of   rasburicase (8/31)  -Daily albumin due to brisk malignant effusion output    Signed,  Nicho Fischer   Pediatric Hematology/Oncology Fellow      Reason for Consult   Reason for consult: I was asked by the pediatric intensive care   unit to evaluate this patient for management of T lymphoblastic   neoplasm.    Primary Care Physician   Rodriguez Montoya    Chief Complaint   T cell neoplasm    History is obtained from the patient and the patient's parent(s)    History of Present Illness   Lazaro Lund is a 19 year old male who presents with   mediastinal mass. His symptoms started 3 months ago with cough   that persisted. 1 month ago, he was seen by his PCP and   prescribed antibiotics. When he did not have improvement of   symptoms, he was seen on 8/20/19 again and an XR revealed pleural   effusion and mediastinal msas. He had a thoracentesis on 8/21   followed by a chest CT and repeat thoracentesis on 8/27. 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 His symptoms that started with cough progressed over   the past few weeks to having shortness of breath with lying down,   cough also worse with lying  down, decreased appetite, and fatigue   from poor sleep quality. He did not have recurrent fevers, night   sweats, or weight loss. He has noticed tender swollen glands on   his left neck and left axilla. No headaches, back pain, abdominal   pain, abdominal distention, testicular swelling    Past Medical History    Born term  No chronic illnesses    Past Surgical History   Past surgical history reviewed with no previous surgeries   identified.    Immunization History   Immunization Status:  Delayed per [facility]    Prior to Admission Medications   Prior to Admission Medications   Prescriptions Last Dose Informant Patient Reported? Taking?   NO ACTIVE MEDICATIONS   Yes No   Sig: .      Facility-Administered Medications: None     Allergies   Allergies   Allergen Reactions     No Known Drug Allergies        Social History   I have updated and reviewed the following Social History   Narrative:   Pediatric History   Patient Guardian Status     Mother:  JHONATHAN RODRIGUEZ     Father:  JENNIFERAVNI     Other Topics Concern     Not on file   Social History Narrative    Lives at home in [place] with Dad and Step-Mom. Biological   mother is involved in his life and accompanied him during this   hospitalization. Has 4 step-siblings and 1 biological sibling.   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 Has completed high school.       Family History   I have reviewed this patient's family history and updated it with   pertinent information if needed.   Family History   Problem Relation Age of Onset     No Known Problems Mother      No Known Problems Father      Asthma Brother      Thyroid Cancer Paternal Grandmother      Melanoma Paternal Aunt        Review of Systems   The 10 point Review of Systems is negative other than noted in   the HPI or here.     Physical Exam   Temp: 98  F (36.7  C) Temp src: Axillary BP: 113/67 Pulse:  103   Heart Rate: 96 Resp: 22 SpO2: 100 % O2 Device: None (Room air)   Oxygen Delivery: 2 LPM  Vital Signs with Ranges  Temp:  [96.8  F (36  C)-98.6  F (37  C)] 98  F (36.7  C)  Pulse:  [] 103  Heart Rate:  [] 96  Resp:  [17-28] 22  BP: ()/(58-91) 113/67  SpO2:  [92 %-100 %] 100 %  165 lbs 12.57 oz     Appearance: Alert and appropriate, well developed, nontoxic, with   moist mucous membranes.  HEENT: Head: Normocephalic and atraumatic. Eyes: PERRL, EOM   grossly intact, conjunctivae and sclerae clear. Nose: Nares clear   with no active discharge.  Mouth/Throat: No oral lesions, pharynx   clear with no erythema or exudate.  Neck: Supple, left side mildly tender masses, with significant   cervical lymphadenopathy  Pulmonary: No grunting, flaring, retractions or stridor. Good air   entry, clear to auscultation on right, no wheezing, rales in left   base with inspiration and expiration.   Cardiovascular: Regular rate and rhythm, normal S1 and S2, with   no murmurs.  Normal symmetric peripheral pulses and brisk cap   refill.  Abdominal: Normal bowel sounds, soft, nontender, nondistended,   with no masses and no hepatosplenomegaly.  Neurologic: Alert and oriented, cranial nerves II-XII grossly   intact, moving all extremities equally with grossly normal   coordination and normal gait.  Extremities/Back: No deformity, no CVA tenderness.  Skin: No significant rashes, ecchymoses, or lacerations.    Data   Results for orders placed or performed during the hospital   encounter of 08/30/19 (from the past 24 hour(s))   Basic metabolic panel   Result Value Ref Range    Sodium 141 133 - 144 mmol/L    Potassium 3.3 (L) 3.4 - 5.3 mmol/L    Chloride 107 94 - 109 mmol/L    Carbon Dioxide 25 20 - 32 mmol/L    Anion Gap 9 3 - 14 mmol/L    Glucose 111 (H) 70 - 99 mg/dL    Urea Nitrogen 3 (L) 7 - 30 mg/dL    Creatinine 0.58 (L) 0.66 - 1.25 mg/dL    GFR Estimate >90 >60 mL/min/[1.73_m2]    GFR Estimate If Black >90 >60  mL/min/[1.73_m2]    Calcium 7.7 (L) 8.5 - 10.1 mg/dL   Phosphorus   Result Value Ref Range    Phosphorus 3.8 2.5 - 4.5 mg/dL   Magnesium   Result Value Ref Range    Magnesium 1.8 1.6 - 2.3 mg/dL   Uric acid   Result Value Ref Range    Uric Acid 3.0 (L) 3.5 - 7.2 mg/dL   Uric acid   Result Value Ref Range    Uric Acid <0.2 (L) 3.5 - 7.2 mg/dL   Albumin level   Result Value Ref Range    Albumin 2.7 (L) 3.4 - 5.0 g/dL   Magnesium   Result Value Ref Range    Magnesium 2.2 1.6 - 2.3 mg/dL   Phosphorus   Result Value Ref Range    Phosphorus 4.1 2.5 - 4.5 mg/dL   Basic metabolic panel   Result Value Ref Range    Sodium 140 133 - 144 mmol/L    Potassium 4.0 3.4 - 5.3 mmol/L    Chloride 109 94 - 109 mmol/L    Carbon Dioxide 25 20 - 32 mmol/L    Anion Gap 6 3 - 14 mmol/L    Glucose 181 (H) 70 - 99 mg/dL    Urea Nitrogen 6 (L) 7 - 30 mg/dL    Creatinine 0.59 (L) 0.66 - 1.25 mg/dL    GFR Estimate >90 >60 mL/min/[1.73_m2]    GFR Estimate If Black >90 >60 mL/min/[1.73_m2]    Calcium 8.0 (L) 8.5 - 10.1 mg/dL   XR Chest Port 1 View    Narrative    XR CHEST PORT 1 VW  9/1/2019 6:58 AM      HISTORY: Monitor mediastinal mass, pleural effusion with chest   tube in  place    COMPARISON: Previous day    FINDINGS:   Portable upright view of the chest. Right arm PICC tip projects   over  the high SVC. Left basilar chest tube is stable in position. No  significant pneumothorax. Trace bilateral pleural effusions. The  cardiac silhouette size is stable. Mediastinal mass is grossly  unchanged from yesterday's examination.      Impression    IMPRESSION:   Trace amount of pleural fluid bilaterally, left basilar chest   tube  stable in position.    FATOUMATA PINK MD   US Abdomen Complete Portable    Narrative    EXAMINATION: US ABDOMEN COMPLETE PORTABLE  9/1/2019 7:53 AM      CLINICAL HISTORY: evaluate for hepatosplenomegaly and   lymphadenopathy  in newly diagnosed lymphoma patient    COMPARISON: None available.        FINDINGS:  The liver is  normal in contour and echogenicity. The liver   measures  16.3 cm in craniocaudal dimension. There is no intrahepatic or  extrahepatic biliary ductal dilatation. The common bile duct   measures  3 mm. The gallbladder is normal, without gallstones, wall   thickening,  or pericholecystic fluid.    The spleen measures maximally 11.6 cm and is normal in   appearance. The  visualized portions of the pancreas are normal in echogenicity.    The visualized upper abdominal aorta and inferior vena cava are  normal.      The kidneys are normal in position and demonstrate borderline  increased echogenicity. The right kidney measures 14.2 cm and the   left  kidney measures 12.8 cm. There is no significant urinary tract  dilation. The urinary bladder is moderately distended with   significant  echogenic debris. The bladder wall is normal.    There is a small right pleural effusion.      Impression    IMPRESSION:   1. Liver size at the upper limits of normal. No splenomegaly.  2. Borderline echogenic renal parenchyma, which can be seen with  medical renal disease. Asymmetric nephromegaly on the right.  3. Significant amount of debris within the bladder. Recommend  correlation with urinalysis.  4. Small right pleural effusion.    I have personally reviewed the examination and initial   interpretation  and I agree with the findings.    FATOUMATA PINK MD   Uric acid   Result Value Ref Range    Uric Acid 0.2 (L) 3.5 - 7.2 mg/dL   Magnesium   Result Value Ref Range    Magnesium 2.2 1.6 - 2.3 mg/dL   Phosphorus   Result Value Ref Range    Phosphorus 4.6 (H) 2.5 - 4.5 mg/dL   Basic metabolic panel   Result Value Ref Range    Sodium 142 133 - 144 mmol/L    Potassium 3.9 3.4 - 5.3 mmol/L    Chloride 112 (H) 94 - 109 mmol/L    Carbon Dioxide 23 20 - 32 mmol/L    Anion Gap 7 3 - 14 mmol/L    Glucose 141 (H) 70 - 99 mg/dL    Urea Nitrogen 9 7 - 30 mg/dL    Creatinine 0.71 0.66 - 1.25 mg/dL    GFR Estimate >90 >60 mL/min/[1.73_m2]    GFR  Estimate If Black >90 >60 mL/min/[1.73_m2]    Calcium 8.0 (L) 8.5 - 10.1 mg/dL   Echo Pediatric (TTE) Complete    Narrative    [id number]  [id number]  [id number]  [id number]^LE^CARMENCITA^KAHLIL                                                                     Study ID: [id number]                                                 [facility]   [address]                                                [place]   [address]                                                Phone: [phone]                                Pediatric Echocardiogram  __________________________________________________________________  ___________  __     Name: PLACIDO RODRIGUEZ  Study Date: 2019 09:18 AM             Patient Location:   URU  MRN: [id number]                             Age: 19 yrs  : 2000                             BP: 109/73 mmHg  Gender: Male  Patient Class: Inpatient                    Height: 183 cm  Ordering Provider: CARMENCITA LUJAN             Weight: 75 kg                                              BSA: 2.0 m2  Performed By: Sirisha Bennett  Report approved by: Savanah Butler MD  Reason For Study: Other, Please Specify in Comments  __________________________________________________________________  ___________  __     ------CONCLUSIONS------  Normal intracardiac connections. There is a small to moderate   circumferential  pericardial effusion. The left and right ventricles have normal   chamber size,  wall thickness, and systolic function. The calculated single   plane left  ventricular ejection fraction from the 4 chamber view is 67%.   There is a large  anterior mediastinal mass causing compression of the left atrium,   right and  left pulmonary arteries, and SVC. The RPA has continous flow with   a mean  gradient is 6 mmHg with a peak gradient of 14 mmHg.  The LPA has a   peak  gradient of 13 mmHg. There is moderate compression and flow   acceleration  through the mid level of the LA from the tumor. The mean gradient   through the  area of compression is 3 mmHg. Proximal portion of the SVC is   difficult to  visulize but the mean gradient is 5 mmHg with a peak gradient of   10 mmHg.  __________________________________________________________________  ___________  __        Technical information:  A complete two dimensional, MMODE, spectral and color Doppler   transthoracic  echocardiogram is performed. Images are obtained from   parasternal, apical,  subcostal and suprasternal notch views. The study quality is   adequate.  Technically difficult study due to poor acoustic windows. ECG   tracing shows  regular rhythm.     Segmental Anatomy:  There is normal atrial arrangement, with concordant   atrioventricular and  ventriculoarterial connections.     Systemic and pulmonary veins:  The systemic venous return is normal. Normal coronary sinus. The   inferior vena  cava is of normal caliber. Proximal portion of the SVC is   difficult to  visulize but the mean gradient is 5 mmHg with a peak gradient of   10 mmHg. The  pulmonary veins are not well visualized.     Atria and atrial septum:  Normal right atrial size. There is moderate compression and flow   acceleration  through the mid level of the LA from the tumor. The mean gradient   through the  area of compression is 3 mmHg. There is no atrial level shunting.        Atrioventricular valves:  The tricuspid valve is normal in appearance and motion. Trivial   tricuspid  valve insufficiency. The mitral valve is normal in appearance and   motion.  There is no mitral valve insufficiency. There is significant   decrease (>20%)  in the MV E-wave velocity with inspiration.     Ventricles and Ventricular Septum:  The left and right ventricles have normal chamber size, wall   thickness, and  systolic function. The calculated single  plane left ventricular   ejection  fraction from the 4 chamber view is 67 %. There is no ventricular   level  shunting.     Outflow tracts:  Normal great artery relationship. There is unobstructed flow   through the right  ventricular outflow tract. The pulmonary valve motion is normal.   There is  normal flow across the pulmonary valve. Trivial pulmonary valve   insufficiency.  There is unobstructed flow through the left ventricular outflow   tract.  Tricuspid aortic valve with normal appearance and motion. There   is normal flow  across the aortic valve.     Great arteries:  The main pulmonary artery has normal appearance. There is   unobstructed flow in  the main pulmonary artery. The pulmonary artery bifurcation is   normal. There  is signficiant compression of both the RPA and the LPA from the   tumor. The RPA  has continous flow with a mean gradient is 6 mmHg with a peak   gradient of 14  mmHg. The LPA has a peak gradient of 13 mmHg. Normal ascending   aorta. The  aortic arch appears normal. There is unobstructed antegrade flow   in the  ascending, transverse arch, descending thoracic and abdominal   aorta. There is  a left aortic arch with normal branching pattern. There is   significant  decrease (>20%) in the peak flow velocity in the abdominal aorta   with  inspiration.     Arterial Shunts:  The ductal region is not imaged with this study.     Coronaries:  The coronary arteries are not evaluated.        Effusions, catheters, cannulas and leads:  There is a large anterior mediastinaal mass. There is a moderate  circumferential pericardial effusion.     MMode/2D Measurements & Calculations  4 Chamber EF: 67.0 %                LVMI(BSA): 93.9 grams/m2  LVMI(Height): 35.9                  RWT(MM): 0.57        Doppler Measurements & Calculations  PA V2 max: 75.9 cm/sec               LPA max marialuisa: 153.1 cm/sec  PA max P.3 mmHg                  LPA max P.7 mmHg     desc Ao max marialuisa: 121.2 cm/sec  desc Ao  max P.9 mmHg     [place] 2D Z-SCORE VALUES  Measurement NameValue  Z-ScorePredictedNormal Range  LPA diam(2D)    0.38 cm  LVLd apical(4ch)7.8 cm  LVLs apical(4ch)6.8 cm     [place] Z-Scores (Measurements & Calculations)  Measurement NameValue      Z-ScorePredictedNormal Range  IVSd(MM)        1.1 cm     0.37   1.0      0.70 - 1.31  IVSs(MM)        1.5 cm     0.69   1.4      1.00 - 1.75  LVIDd(MM)       4.4 cm     -2.0   5.2      4.4 - 5.9  LVIDs(MM)       2.8 cm     -1.6   3.4      2.6 - 4.1  LVPWd(MM)       1.3 cm     2.4    0.95     0.69 - 1.20  LVPWs(MM)       1.6 cm     0.25   1.6      1.2 - 1.9  LV mass(C)d(MM) 183.4 grams-0.09  186.6    126.1 - 276.2  FS(MM)          37.4 %     0.67   34.9     28.5 - 42.6           Report approved by: Jocy Flower 2019 03:05 PM      Leukemia Lymphoma Evaluation Non CSF   Result Value Ref Range    Copath Report       Patient Name: PLACIDO RODRIGUEZ  MR#: [id number]  Specimen #: [id number]  Collected: 2019 13:29  Received: 2019 15:35  Reported: 2019 18:55  Ordering Phy(s): MARC MADRID    For improved result formatting, select 'View Enhanced Report   Format' under   Linked Documents section.  _________________________________________    SPECIMEN(S):  Bone marrow, left    INTERPRETATION:  Bone marrow, left:       No definitive involvement by T-ALL, very rare T cell   precursors   (0.01%)       No increase in myeloid blasts and no abnormal myeloid blast   population       See comment    COMMENT:  There is no definitive evidence for T-ALL within the limits of   sensitivity   of this assay. There are very rare  events with immunophenotype similar to the abnormal T   lymphoblasts seen in   concurrent pleural fluid specimen  (to be reported separately as [id number]), however they do not   form   discrete population and are very few, below  the limit of  sensitivity of this assay. At such a low frequenc y   they are   difficult to distinguish from  rare  normal T/NK  precursors. These findings are no definitive for   involvement   by T-ALL, correlation with  morphologic and cytogenetic findings is required for final   interpretation.    RESULTS:  Percentages reported below are based on the total number of CD45   positive   viable leukocytes. If applicable,  percentage of plasma cells is from total viable nucleated cells.    0.01% (only 17 events)  immature T/NK precursors with the   following   immunophenotype:  Positive for CD2, CD4 (partial), CD7, CD34 (partial), CD38, and   HLA-DR  Negative for CD8, CD13, CD14, CD19, CD33, CD56, CD64  Resident/Fellow Review by:  Dr. Floresita Goodwin    A resident/fellow in an [employer]CGME accredited training program was   involved in   the selection of testing, review of  flow scattergrams, and/or interpretation of this case. I, as the   senior   physician, attest that I: (i)  confirmed appropriate testing, (ii) examined the relevant flow   scattergrams for the specimen(s); an d (ii)  rendered or confirmed the interpretation(s).    ANTIBODIES:  Four, eight and ten color analyses are performed for the   following   markers: CD1a, CD2, surface and cytoplasmic  CD3, CD4, CD5, CD7, CD8, CD10, CD11b, CD13, CD14, CD15, CD16,   CD19, CD20,   CD22, CD33, CD34, CD36, CD38, CD45,  CD56, CD58, CD61, CD64, , glycophorin A, HLA-DR, cytoplasmic   CD79a,   cytoplasmic myeloperoxidase, and  nuclear terminal deoxynucleotidyl transferase (TdT). Cells are   gated to   isolate populations (CD45 versus side  scatter and forward scatter versus side scatter), to exclude   debris   (forward scatter versus side scatter) and  to exclude cell doublets (forward scatter height versus forward   scatter   width and side scatter height versus  side scatter width). Forward scatter varies with cell size. Side   scatter   varies with the amount of cytoplasmic  granules. Intensity for CD45 usually increases as hematolymphoid   cells   mature. The analytic  sensitivity of  this assay to detect abnormal B-l ymphoblasts as rare flow events   is 0.01%.    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    Electronically signed out by:    Prince Reynoso M.D.,[facility]    This test was developed and its performance characteristics   determined by   [facility]. It has not been cleared   or   approved by the US Food and Drug  Administration.  FDA does not require this test to go through   premarket   FDA review. This test is used for  clinical purposes and should not be regarded as investigational   or for   research.  This laboratory is certified  under the Clinical Laboratory Improvement Amendments (CLIA) as   qualified   to perform high complexity clinical  laboratory testing.    CPT Codes:  A: 41471-94-SNFE15(29), 83264-YU, 35683-ZDXK>15, 83309-36-YLES56    TESTING LAB  LOCATION:  [address] [id number] [phone]    COLLECTION SITE:  Client:  [facility]  Location:  URU3 (B)     Uric acid   Result Value Ref Range    Uric Acid Canceled, Test credited 3.5 - 7.2 mg/dL   Magnesium   Result Value Ref Range    Magnesium Canceled, Test credited 1.6 - 2.3 mg/dL   Phosphorus   Result Value Ref Range    Phosphorus Canceled, Test credited 2.5 - 4.5 mg/dL   Calcium ionized whole blood (Q6H)   Result Value Ref Range    Calcium Ionized Whole Blood 4.0 (L) 4.4 - 5.2 mg/dL   CBC with platelets differential   Result Value Ref Range    WBC 9.5 4.0 - 11.0 10e9/L    RBC Count 4.50 4.4 - 5.9 10e12/L    Hemoglobin 12.0 (L) 13.3 - 17.7 g/dL    Hematocrit 37.0 (L) 40.0 - 53.0 %    MCV 82 78 - 100 fl    MCH 26.7 26.5 - 33.0 pg    MCHC  32.4 31.5 - 36.5 g/dL    RDW 11.8 10.0 - 15.0 %    Platelet Count 398 150 - 450 10e9/L    Diff Method Automated Method     % Neutrophils 89.0 %    % Lymphocytes 5.9 %    % Monocytes 4.4 %    % Eosinophils 0.0 %    % Basophils 0.1 %    % Immature Granulocytes 0.6 %    Nucleated RBCs 0 0 /100    Absolute Neutrophil 8.5 (H) 1.6 - 8.3 10e9/L    Absolute Lymphocytes 0.6 (L) 0.8 - 5.3 10e9/L    Absolute Monocytes 0.4 0.0 - 1.3 10e9/L    Absolute Eosinophils 0.0 0.0 - 0.7 10e9/L    Absolute Basophils 0.0 0.0 - 0.2 10e9/L    Abs Immature Granulocytes 0.1 0 - 0.4 10e9/L    Absolute Nucleated RBC 0.0    Comprehensive metabolic panel   Result Value Ref Range    Sodium Canceled, Test credited 133 - 144 mmol/L    Potassium Canceled, Test credited 3.4 - 5.3 mmol/L    Chloride Canceled, Test credited 94 - 109 mmol/L    Carbon Dioxide Canceled, Test credited 20 - 32 mmol/L    Anion Gap Canceled, Test credited 6 - 17 mmol/L    Glucose Canceled, Test credited 70 - 99 mg/dL    Urea Nitrogen Canceled, Test credited 7 - 30 mg/dL    Creatinine Canceled, Test credited 0.66 - 1.25 mg/dL    GFR Estimate Canceled, Test credited >60 mL/min/[1.73_m2]    GFR Estimate If Black Canceled, Test credited >60   mL/min/[1.73_m2]    Calcium Canceled, Test credited 8.5 - 10.1 mg/dL    Bilirubin Total Canceled, Test credited 0.2 - 1.3 mg/dL    Albumin Canceled, Test credited 3.4 - 5.0 g/dL    Protein Total Canceled, Test credited 6.8 - 8.8 g/dL    Alkaline Phosphatase Canceled, Test credited 40 - 150 U/L    ALT Canceled, Test credited 0 - 70 U/L    AST Canceled, Test credited 0 - 45 U/L   Glucose whole blood   Result Value Ref Range    Glucose Canceled, Test credited 70 - 99 mg/dL   Comprehensive metabolic panel   Result Value Ref Range    Sodium 144 133 - 144 mmol/L    Potassium 3.8 3.4 - 5.3 mmol/L    Chloride 113 (H) 94 - 109 mmol/L    Carbon Dioxide 23 20 - 32 mmol/L   [Narrative was truncated due to length]   Uric acid   Result Value Ref Range     Uric Acid 0.3 (L) 3.5 - 7.2 mg/dL   Magnesium   Result Value Ref Range    Magnesium 2.2 1.6 - 2.3 mg/dL   Phosphorus   Result Value Ref Range    Phosphorus 3.3 2.5 - 4.5 mg/dL   Basic metabolic panel   Result Value Ref Range    Sodium 144 133 - 144 mmol/L    Potassium 3.7 3.4 - 5.3 mmol/L    Chloride 114 (H) 94 - 109 mmol/L    Carbon Dioxide 24 20 - 32 mmol/L    Anion Gap 6 3 - 14 mmol/L    Glucose 133 (H) 70 - 99 mg/dL    Urea Nitrogen 12 7 - 30 mg/dL    Creatinine 0.83 0.66 - 1.25 mg/dL    GFR Estimate >90 >60 mL/min/[1.73_m2]    GFR Estimate If Black >90 >60 mL/min/[1.73_m2]    Calcium 7.5 (L) 8.5 - 10.1 mg/dL   Calcium ionized whole blood (Q6H)   Result Value Ref Range    Calcium Ionized Whole Blood 4.5 4.4 - 5.2 mg/dL   Uric acid   Result Value Ref Range    Uric Acid 0.5 (L) 3.5 - 7.2 mg/dL   Magnesium   Result Value Ref Range    Magnesium 2.4 (H) 1.6 - 2.3 mg/dL   Phosphorus   Result Value Ref Range    Phosphorus 4.0 2.5 - 4.5 mg/dL   Basic metabolic panel   Result Value Ref Range    Sodium 144 133 - 144 mmol/L    Potassium 4.0 3.4 - 5.3 mmol/L    Chloride 115 (H) 94 - 109 mmol/L    Carbon Dioxide 22 20 - 32 mmol/L    Anion Gap 7 3 - 14 mmol/L    Glucose 159 (H) 70 - 99 mg/dL    Urea Nitrogen 13 7 - 30 mg/dL    Creatinine 0.82 0.66 - 1.25 mg/dL    GFR Estimate >90 >60 mL/min/[1.73_m2]    GFR Estimate If Black >90 >60 mL/min/[1.73_m2]    Calcium 7.2 (L) 8.5 - 10.1 mg/dL   Calcium ionized whole blood (Q6H)   Result Value Ref Range    Calcium Ionized Whole Blood 4.4 4.4 - 5.2 mg/dL   Albumin level   Result Value Ref Range    Albumin 2.4 (L) 3.4 - 5.0 g/dL   XR Chest Port 1 View    Narrative    XR CHEST PORT 1 VW  9/2/2019 8:28 AM      HISTORY: mediastinal mass    COMPARISON: Previous day    FINDINGS:   2 portable upright views of the chest obtained. Right arm PICC stable  in position projecting over the high SVC. Left chest tube in has  migrated laterally. There is a tiny left apical pneumothorax. There is  a  trace amount of residual pleural fluid.    The cardiac silhouette size is normal. The mediastinal mass is similar  in size from comparison examination. No new focal pulmonary opacity.      Impression    IMPRESSION:   Chest tube has migrated somewhat laterally. Tiny left apical  pneumothorax. Trace residual pleural fluid.    FATOUMATA PINK MD   Echo Pediatric (TTE) Complete    Narrative    [id number]  [id number]  [id number]  [id number]^DIMITRIOS^SULY                                                                   Study ID: [id number]                                                 [facility] [address]                                                Phone: [phone]                                Pediatric Echocardiogram  _____________________________________________________________________________  __     Name: PLACIDO RODRIGUEZ  Study Date: 2019 09:36 AM               Patient Location: URU3  MRN: [id number]                               Age: 19 yrs  : 2000                               BP: 115/70 mmHg  Gender: Male  Patient Class: Inpatient                      Height: 72 in  Ordering Provider: SULY PLUNKETT             Weight: 166 lb                                                BSA: 2.0 m2  Performed By: Sirisha Bennett  Report approved by: Savanah Butler MD  Reason For Study: Other Cardiac Device In Situ  _____________________________________________________________________________  __     ------CONCLUSIONS------  Normal intracardiac connections. There is a small circumferential pericardial  effusion. There is collapse of the right ventricular free wall in  diastole.There is still a decrease in peak flow velocity in the abdominal  aorta with inspiration but is less prominent compared to  previous echoes.The  left and right ventricles have normal chamber size, wall thickness, and  systolic function. The calculated single plane left ventricular ejection  fraction from the 4 chamber view is 68%. There is a large anterior mediastinal  mass causing compression of the left atrium, right and left pulmonary  arteries, and SVC. The RPA has continous flow with a mean gradient is 8 mmHg  with a peak gradient of 23 mmHg. The LPA has a peak gradient of 12 mmHg. There  is moderate compression and flow acceleration through the mid level of the LA  causing a gradient with a mean of 4-6 mmHg. Proximal portion of the SVC is  difficult to visulize but there is moderate turbulance with a mean gradient is  7 mmHg.     Compared to the previous echo, the pericardial effusion is drecreased in size.              _____________________________________________________________________________  __        Technical information:  A complete two dimensional, MMODE, spectral and color Doppler transthoracic  echocardiogram is performed. Images are obtained from parasternal, apical,  subcostal and suprasternal notch views. The study quality is adequate.  Technically difficult study due to poor acoustic windows. ECG tracing shows  regular rhythm.     Segmental Anatomy:  There is normal atrial arrangement, with concordant atrioventricular and  ventriculoarterial connections.     Systemic and pulmonary veins:  The systemic venous return is normal. Normal coronary sinus. The inferior vena  cava is of normal caliber. Proximal portion of the SVC is difficult to  visulize but the mean gradient is 7 mmHg . Color flow demonstrates flow from  two pulmonary veins entering the left atrium.     Atria and atrial septum:  Normal right atrial size. There is moderate compression and flow acceleration  through the mid level of the LA from the tumor. The mean gradient through the  area of compression is 4 mmHg. The atrial septum is not well  visualized.        Atrioventricular valves:  The tricuspid valve is normal in appearance and motion. Trivial tricuspid  valve insufficiency. Estimated right ventricular systolic pressure is at least  45 mmHg plus right atrial pressure. The mitral valve is normal in appearance  and motion. There is no mitral valve insufficiency.     Ventricles and Ventricular Septum:  The left and right ventricles have normal chamber size, wall thickness, and  systolic function. The calculated single plane left ventricular ejection  fraction from the 4 chamber view is 68 %. There is no ventricular level  shunting.     Outflow tracts:  Normal great artery relationship. There is unobstructed flow through the right  ventricular outflow tract. The pulmonary valve motion is normal. There is  normal flow across the pulmonary valve. Trivial pulmonary valve insufficiency.  There is unobstructed flow through the left ventricular outflow tract.  Tricuspid aortic valve with normal appearance and motion. There is normal flow  across the aortic valve.     Great arteries:  The main pulmonary artery has normal appearance. There is unobstructed flow in  the main pulmonary artery. The pulmonary artery bifurcation is normal. There  is signficiant compression of both the RPA and the LPA from the tumor. The RPA  has continous flow with a mean gradient is 8 mmHg with a peak gradient of 23  mmHg. The LPA has a peak gradient of 14 mmHg. Normal ascending aorta. The  aortic arch appears normal. There is unobstructed antegrade flow in the  ascending, transverse arch, descending thoracic and abdominal aorta. There is  still a variation in flow in the peak flow velocity in the abdominal aorta  with inspiration- but compared to previous echos it is getting better.     Arterial Shunts:  The ductal region is not imaged with this study.     Coronaries:  The coronary arteries are not evaluated.        Effusions, catheters, cannulas and leads:  There is a large  anterior mediastinaal mass. There is a small circumferential  pericardial effusion. There is collapse of the right ventricular free wall in  diastole.     MMode/2D Measurements & Calculations  4 Chamber EF: 68.0 %                LVMI(BSA): 90.1 grams/m2  LVMI(Height): 34.5                  RWT(MM): 0.40        Doppler Measurements & Calculations  MV mean P.8 mmHg                  PA V2 max: 82.1 cm/sec                                        PA max P.7 mmHg  TR max marialuisa: 331.8 cm/sec              LPA max marialuisa: 175.2 cm/sec  TR max P.0 mmHg                  LPA max P.3 mmHg                                        RPA max marialuisa: 241.1 cm/sec                                        RPA max P.2 mmHg                                        RPA mean P.9 mmHg     [place] 2D Z-SCORE VALUES  Measurement NameValue Z-ScorePredictedNormal Range  LVLd apical(4ch)8.4 cm  LVLs apical(4ch)6.3 cm     [place] Z-Scores (Measurements & Calculations)  Measurement NameValue      Z-ScorePredictedNormal Range  IVSd(MM)        0.99 cm    -0.09  1.0      0.70 - 1.31  IVSs(MM)        1.3 cm     -0.16  1.4      1.00 - 1.75  LVIDd(MM)       4.9 cm     -0.66  5.2      4.4 - 5.9  LVIDs(MM)       3.1 cm     -0.77  3.4      2.6 - 4.1  LVPWd(MM)       0.99 cm    0.37   0.95     0.69 - 1.20  LVPWs(MM)       1.1 cm     -2.3   1.6      1.2 - 1.9  LV mass(C)d(MM) 176.1 grams-0.29  186.6    126.1 - 276.2  FS(MM)          37.7 %     0.77   34.9     28.5 - 42.6           Report approved by: Jocy Flower 2019 10:50 AM      Magnesium   Result Value Ref Range    Magnesium 2.4 (H) 1.6 - 2.3 mg/dL   Phosphorus   Result Value Ref Range    Phosphorus 3.3 2.5 - 4.5 mg/dL   Basic metabolic panel   Result Value Ref Range    Sodium 143 133 - 144 mmol/L    Potassium 3.9 3.4 - 5.3 mmol/L    Chloride 114 (H) 94 - 109 mmol/L    Carbon Dioxide 23 20 - 32 mmol/L    Anion Gap 6 3 - 14 mmol/L    Glucose 130 (H) 70 - 99 mg/dL    Urea Nitrogen  13 7 - 30 mg/dL    Creatinine 0.77 0.66 - 1.25 mg/dL    GFR Estimate >90 >60 mL/min/[1.73_m2]    GFR Estimate If Black >90 >60 mL/min/[1.73_m2]    Calcium 7.4 (L) 8.5 - 10.1 mg/dL   Calcium ionized whole blood (Q6H)   Result Value Ref Range    Calcium Ionized Whole Blood 4.6 4.4 - 5.2 mg/dL

## 2019-09-03 ENCOUNTER — APPOINTMENT (OUTPATIENT)
Dept: GENERAL RADIOLOGY | Facility: CLINIC | Age: 19
DRG: 847 | End: 2019-09-03
Attending: STUDENT IN AN ORGANIZED HEALTH CARE EDUCATION/TRAINING PROGRAM
Payer: COMMERCIAL

## 2019-09-03 ENCOUNTER — APPOINTMENT (OUTPATIENT)
Dept: CARDIOLOGY | Facility: CLINIC | Age: 19
DRG: 847 | End: 2019-09-03
Attending: STUDENT IN AN ORGANIZED HEALTH CARE EDUCATION/TRAINING PROGRAM
Payer: COMMERCIAL

## 2019-09-03 DIAGNOSIS — C83.50 T LYMPHOBLASTIC LYMPHOMA (H): Primary | ICD-10-CM

## 2019-09-03 LAB
ALBUMIN SERPL-MCNC: 2.3 G/DL (ref 3.4–5)
ANION GAP SERPL CALCULATED.3IONS-SCNC: 5 MMOL/L (ref 3–14)
ANION GAP SERPL CALCULATED.3IONS-SCNC: 5 MMOL/L (ref 3–14)
ANION GAP SERPL CALCULATED.3IONS-SCNC: 7 MMOL/L (ref 3–14)
ANION GAP SERPL CALCULATED.3IONS-SCNC: 7 MMOL/L (ref 3–14)
BUN SERPL-MCNC: 12 MG/DL (ref 7–30)
BUN SERPL-MCNC: 13 MG/DL (ref 7–30)
BUN SERPL-MCNC: 13 MG/DL (ref 7–30)
BUN SERPL-MCNC: 18 MG/DL (ref 7–30)
CA-I BLD-MCNC: 4.6 MG/DL (ref 4.4–5.2)
CALCIUM SERPL-MCNC: 7.2 MG/DL (ref 8.5–10.1)
CALCIUM SERPL-MCNC: 7.6 MG/DL (ref 8.5–10.1)
CALCIUM SERPL-MCNC: 7.7 MG/DL (ref 8.5–10.1)
CALCIUM SERPL-MCNC: 7.8 MG/DL (ref 8.5–10.1)
CHLORIDE SERPL-SCNC: 109 MMOL/L (ref 94–109)
CHLORIDE SERPL-SCNC: 111 MMOL/L (ref 94–109)
CHLORIDE SERPL-SCNC: 111 MMOL/L (ref 94–109)
CHLORIDE SERPL-SCNC: 113 MMOL/L (ref 94–109)
CO2 SERPL-SCNC: 23 MMOL/L (ref 20–32)
CO2 SERPL-SCNC: 24 MMOL/L (ref 20–32)
CO2 SERPL-SCNC: 24 MMOL/L (ref 20–32)
CO2 SERPL-SCNC: 27 MMOL/L (ref 20–32)
COPATH REPORT: NORMAL
CREAT SERPL-MCNC: 0.68 MG/DL (ref 0.66–1.25)
CREAT SERPL-MCNC: 0.72 MG/DL (ref 0.66–1.25)
CREAT SERPL-MCNC: 0.75 MG/DL (ref 0.66–1.25)
CREAT SERPL-MCNC: 0.79 MG/DL (ref 0.66–1.25)
GFR SERPL CREATININE-BSD FRML MDRD: >90 ML/MIN/{1.73_M2}
GLUCOSE SERPL-MCNC: 121 MG/DL (ref 70–99)
GLUCOSE SERPL-MCNC: 126 MG/DL (ref 70–99)
GLUCOSE SERPL-MCNC: 144 MG/DL (ref 70–99)
GLUCOSE SERPL-MCNC: 150 MG/DL (ref 70–99)
HBV SURFACE AB SERPL IA-ACNC: 1.57 M[IU]/ML
HBV SURFACE AG SERPL QL IA: NONREACTIVE
HCV AB SERPL QL IA: NONREACTIVE
HIV 1+2 AB+HIV1 P24 AG SERPL QL IA: NONREACTIVE
MAGNESIUM SERPL-MCNC: 2.4 MG/DL (ref 1.6–2.3)
MAGNESIUM SERPL-MCNC: 2.5 MG/DL (ref 1.6–2.3)
MAGNESIUM SERPL-MCNC: 2.5 MG/DL (ref 1.6–2.3)
MAGNESIUM SERPL-MCNC: 2.6 MG/DL (ref 1.6–2.3)
PHOSPHATE SERPL-MCNC: 3.1 MG/DL (ref 2.5–4.5)
PHOSPHATE SERPL-MCNC: 3.4 MG/DL (ref 2.5–4.5)
PHOSPHATE SERPL-MCNC: 3.6 MG/DL (ref 2.5–4.5)
PHOSPHATE SERPL-MCNC: 3.7 MG/DL (ref 2.5–4.5)
POTASSIUM SERPL-SCNC: 3.9 MMOL/L (ref 3.4–5.3)
POTASSIUM SERPL-SCNC: 3.9 MMOL/L (ref 3.4–5.3)
POTASSIUM SERPL-SCNC: 4.1 MMOL/L (ref 3.4–5.3)
POTASSIUM SERPL-SCNC: 4.2 MMOL/L (ref 3.4–5.3)
SODIUM SERPL-SCNC: 141 MMOL/L (ref 133–144)
SODIUM SERPL-SCNC: 141 MMOL/L (ref 133–144)
SODIUM SERPL-SCNC: 142 MMOL/L (ref 133–144)
SODIUM SERPL-SCNC: 142 MMOL/L (ref 133–144)
URATE SERPL-MCNC: 0.7 MG/DL (ref 3.5–7.2)
URATE SERPL-MCNC: 0.9 MG/DL (ref 3.5–7.2)
URATE SERPL-MCNC: 1.1 MG/DL (ref 3.5–7.2)
VZV IGG SER QL IA: 0.6 AI (ref 0–0.8)

## 2019-09-03 PROCEDURE — 80069 RENAL FUNCTION PANEL: CPT | Performed by: STUDENT IN AN ORGANIZED HEALTH CARE EDUCATION/TRAINING PROGRAM

## 2019-09-03 PROCEDURE — 25000132 ZZH RX MED GY IP 250 OP 250 PS 637

## 2019-09-03 PROCEDURE — 84550 ASSAY OF BLOOD/URIC ACID: CPT | Performed by: STUDENT IN AN ORGANIZED HEALTH CARE EDUCATION/TRAINING PROGRAM

## 2019-09-03 PROCEDURE — 20300000 ZZH R&B PICU UMMC

## 2019-09-03 PROCEDURE — 25800030 ZZH RX IP 258 OP 636: Performed by: PEDIATRICS

## 2019-09-03 PROCEDURE — 25000128 H RX IP 250 OP 636: Performed by: PEDIATRICS

## 2019-09-03 PROCEDURE — 93306 TTE W/DOPPLER COMPLETE: CPT

## 2019-09-03 PROCEDURE — 83735 ASSAY OF MAGNESIUM: CPT | Performed by: STUDENT IN AN ORGANIZED HEALTH CARE EDUCATION/TRAINING PROGRAM

## 2019-09-03 PROCEDURE — 25000132 ZZH RX MED GY IP 250 OP 250 PS 637: Performed by: STUDENT IN AN ORGANIZED HEALTH CARE EDUCATION/TRAINING PROGRAM

## 2019-09-03 PROCEDURE — 82330 ASSAY OF CALCIUM: CPT | Performed by: STUDENT IN AN ORGANIZED HEALTH CARE EDUCATION/TRAINING PROGRAM

## 2019-09-03 PROCEDURE — 71045 X-RAY EXAM CHEST 1 VIEW: CPT

## 2019-09-03 PROCEDURE — 25000128 H RX IP 250 OP 636: Performed by: STUDENT IN AN ORGANIZED HEALTH CARE EDUCATION/TRAINING PROGRAM

## 2019-09-03 PROCEDURE — 25800030 ZZH RX IP 258 OP 636

## 2019-09-03 PROCEDURE — 84100 ASSAY OF PHOSPHORUS: CPT | Performed by: STUDENT IN AN ORGANIZED HEALTH CARE EDUCATION/TRAINING PROGRAM

## 2019-09-03 PROCEDURE — 80048 BASIC METABOLIC PNL TOTAL CA: CPT | Performed by: STUDENT IN AN ORGANIZED HEALTH CARE EDUCATION/TRAINING PROGRAM

## 2019-09-03 PROCEDURE — 25000132 ZZH RX MED GY IP 250 OP 250 PS 637: Performed by: PEDIATRICS

## 2019-09-03 RX ORDER — POLYETHYLENE GLYCOL 3350 17 G/17G
17 POWDER, FOR SOLUTION ORAL DAILY
Status: DISCONTINUED | OUTPATIENT
Start: 2019-09-03 | End: 2019-09-09 | Stop reason: HOSPADM

## 2019-09-03 RX ORDER — SENNOSIDES 8.6 MG
8.6 TABLET ORAL 2 TIMES DAILY PRN
Status: DISCONTINUED | OUTPATIENT
Start: 2019-09-03 | End: 2019-09-09 | Stop reason: HOSPADM

## 2019-09-03 RX ADMIN — POLYETHYLENE GLYCOL 3350 17 G: 17 POWDER, FOR SOLUTION ORAL at 07:48

## 2019-09-03 RX ADMIN — ACETAMINOPHEN 650 MG: 325 TABLET, FILM COATED ORAL at 07:48

## 2019-09-03 RX ADMIN — DEXAMETHASONE SODIUM PHOSPHATE 9.76 MG: 4 INJECTION, SOLUTION INTRAMUSCULAR; INTRAVENOUS at 07:48

## 2019-09-03 RX ADMIN — Medication 150 MG: at 14:29

## 2019-09-03 RX ADMIN — Medication 150 MG: at 07:48

## 2019-09-03 RX ADMIN — SULFAMETHOXAZOLE AND TRIMETHOPRIM 1 TABLET: 800; 160 TABLET ORAL at 19:41

## 2019-09-03 RX ADMIN — DIPHENHYDRAMINE HYDROCHLORIDE 50 MG: 25 CAPSULE ORAL at 22:09

## 2019-09-03 RX ADMIN — Medication 2 ML: at 05:02

## 2019-09-03 RX ADMIN — HEPARIN, PORCINE (PF) 10 UNIT/ML INTRAVENOUS SYRINGE 2 ML: at 16:38

## 2019-09-03 RX ADMIN — ACETAMINOPHEN 650 MG: 325 TABLET, FILM COATED ORAL at 19:41

## 2019-09-03 RX ADMIN — SULFAMETHOXAZOLE AND TRIMETHOPRIM 1 TABLET: 800; 160 TABLET ORAL at 07:50

## 2019-09-03 RX ADMIN — FAMOTIDINE 20 MG: 20 INJECTION, SOLUTION INTRAVENOUS at 07:51

## 2019-09-03 RX ADMIN — DEXTROSE AND SODIUM CHLORIDE 1000 ML: 5; 900 INJECTION, SOLUTION INTRAVENOUS at 23:39

## 2019-09-03 RX ADMIN — ACETAMINOPHEN 650 MG: 325 TABLET, FILM COATED ORAL at 02:15

## 2019-09-03 RX ADMIN — FAMOTIDINE 20 MG: 20 INJECTION, SOLUTION INTRAVENOUS at 19:53

## 2019-09-03 RX ADMIN — Medication 3 MG: at 22:09

## 2019-09-03 RX ADMIN — DEXAMETHASONE SODIUM PHOSPHATE 9.76 MG: 4 INJECTION, SOLUTION INTRAMUSCULAR; INTRAVENOUS at 19:47

## 2019-09-03 RX ADMIN — Medication 150 MG: at 19:41

## 2019-09-03 RX ADMIN — ACETAMINOPHEN 650 MG: 325 TABLET, FILM COATED ORAL at 14:29

## 2019-09-03 RX ADMIN — DEXTROSE AND SODIUM CHLORIDE 1000 ML: 5; 900 INJECTION, SOLUTION INTRAVENOUS at 04:16

## 2019-09-03 ASSESSMENT — MIFFLIN-ST. JEOR: SCORE: 1823.62

## 2019-09-03 NOTE — PLAN OF CARE
Afebrile. VSS. Denies pain. No increased WOB noted, lungs clear. Continues with scheduled tylenol. Chest tube output of 140 ml serous drainage. Large void X1. No stool. MIVF increased overnight per PO/IV titrate order. Continue to monitor labs q 6hr. Mother at bedside, attentive to patient, asking appropriate questions.

## 2019-09-03 NOTE — PLAN OF CARE
[id number]: AVSS. No complaints of pain, continues on scheduled tylenol. LS clear, infrequent cough. 90mL serous output from left Chest tube. Infrequent cough. Good urine output. No stool. Continuing to monitor labs Q6hr. Patient up and walked around hallway x1. Mother at bedside and updated with POC.

## 2019-09-03 NOTE — PROGRESS NOTES
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 He was started on induction therapy per [id number] on 9/1 (note: Dexamethasone started on 8/31 pending logistical ability to perform bone marrow biopsy). He is being monitored in the PICU due to high risk of TLS and due to cardiac compression by his mass. He is tolerating treatment well so far with continued improvement of symptoms.      Recommendations  -Staging: OSH chest CT in PAX; LP; BMB; AUS. We will follow pending studies. Unable to lie flat for C/A/P CT scans at this time.  -Continue induction therapy: Decadron today, day 3 of induction  -CT to water seal today, AM chest XR  -Agree with daily ECHOs to re-assess cardiac compression   -TLS monitoring: Q6H BMP with Mg, Phos, iCa and uric acid  -TLS prophylaxis (s/p rasburicase 8/31): mIVF (ensure no potassium containing fluids), 150mg allopurinol TID  -Swallow study today and advance diet as tolerated  -Other labs: daily albumin, CBC/Coags on Wednesday and then every other day CBC  -Continue famotidine and prophylactic bactrim     Jacobo,  Nicho Fischer   Pediatric Hematology/Oncology Fellow    Interval History   No acute overnight events. Lazaro continues to state many of his symptoms are improving, including improvement of cough/shortness of breath, improvement of lymphadenopathy, and improved tolerance of swallowing liquids. His tumor lysis labs have been largely within normal limits. His chest tube drainage has slowed. Yesterday, he was walking in the halls without shortness of breath or syncopal  symptoms.     Physical Exam   Temp: 97.6  F (36.4  C) Temp src: Axillary BP: 116/50 Pulse: 66 Heart Rate: 67 Resp: 22 SpO2: 100 % O2 Device: None (Room air)    Vitals:    08/30/19 1523 08/30/19 1811   Weight: 75.5 kg (166 lb 7.2 oz) 75.2 kg (165 lb 12.6 oz)     Vital Signs with Ranges  Temp:  [97.6  F (36.4  C)-98.3  F (36.8  C)] 97.6  F (36.4  C)  Pulse:  [62-93] 66  Heart Rate:  [60-84] 67  Resp:  [12-23] 22  BP: ()/(49-67) 116/50  FiO2 (%):  [21 %] 21 %  SpO2:  [92 %-100 %] 100 %  I/O last 3 completed shifts:  In: 4177.99 [P.O.:1855; I.V.:2322.99]  Out: 2627 [Urine:2275; Blood:2; Chest Tube:350]     Appearance: Alert and appropriate, well developed, nontoxic, with moist mucous membranes.  HEENT: Head: Normocephalic and atraumatic. Eyes: PERRL, EOM grossly intact, conjunctivae and sclerae clear. Nose: Nares clear with no active discharge.  Mouth/Throat: No oral lesions, pharynx clear with no erythema or exudate.  Neck: Supple, resolving left cervical lymphadenopathy, improved left axillary lymphadenopathy  Pulmonary: No grunting, flaring, retractions or stridor. Good air entry, clear to auscultation bilaterally.   Cardiovascular: Regular rate and rhythm, normal S1 and S2, with no murmurs.  Normal symmetric peripheral pulses and brisk cap refill.  Abdominal: Normal bowel sounds, soft, nontender, nondistended, with no masses and no hepatosplenomegaly.  Neurologic: Alert and oriented, cranial nerves II-XII grossly intact, moving all extremities equally with grossly normal coordination.  Skin: No significant rashes, ecchymoses, or lacerations.  Extremities: Right antecubital double lumen PICC line, no swelling or tenderness of arm.  Left antecubital peripheral IV, site not tender or swollen.    Medications     dextrose 5% and 0.9% NaCl 85 mL/hr at 09/03/19 1213     - MEDICATION INSTRUCTIONS -       sodium chloride         acetaminophen  650 mg Oral Q6H     allopurinol  150 mg Oral TID     INTRATHECAL chemo -  Cytarabine and/or methotrexate and/or Hydrocortisone   Intrathecal Once     DAUNOrubicin (CERUBIDINE) chemo infusion  25 mg/m2 (Treatment Plan Recorded) Intravenous Q7 Days     dexamethasone  5 mg/m2 (Treatment Plan Recorded) Intravenous Q12H     famotidine  20 mg Intravenous Q12H     heparin lock flush  2-4 mL Intracatheter Q24H     heparin lock flush  2-4 mL Intracatheter Q24H     [START ON 9/8/2019] INTRATHECAL chemo - Cytarabine and/or methotrexate and/or Hydrocortisone   Intrathecal Once     [START ON 9/8/2019] ondansetron  8 mg Intravenous Q6H     [START ON 9/4/2019] pegasparginase (ONCASPAR) infusion  2,500 Units/m2 (Treatment Plan Recorded) Intravenous Once     polyethylene glycol  17 g Oral Daily     sodium chloride (PF)  3 mL Intracatheter Q8H     sulfamethoxazole-trimethoprim  1 tablet Oral Q Mon Tues BID     vinCRIStine (ONCOVIN) chemo infusion  2 mg Intravenous Q7 Days       Data   Results for orders placed or performed during the hospital encounter of 08/30/19 (from the past 24 hour(s))   Magnesium   Result Value Ref Range    Magnesium 2.5 (H) 1.6 - 2.3 mg/dL   Phosphorus   Result Value Ref Range    Phosphorus 3.8 2.5 - 4.5 mg/dL   Basic metabolic panel   Result Value Ref Range    Sodium 143 133 - 144 mmol/L    Potassium 4.0 3.4 - 5.3 mmol/L    Chloride 114 (H) 94 - 109 mmol/L    Carbon Dioxide 23 20 - 32 mmol/L    Anion Gap 6 3 - 14 mmol/L    Glucose 130 (H) 70 - 99 mg/dL    Urea Nitrogen 12 7 - 30 mg/dL    Creatinine 0.79 0.66 - 1.25 mg/dL    GFR Estimate >90 >60 mL/min/[1.73_m2]    GFR Estimate If Black >90 >60 mL/min/[1.73_m2]    Calcium 7.3 (L) 8.5 - 10.1 mg/dL   Calcium ionized whole blood   Result Value Ref Range    Calcium Ionized Whole Blood 4.6 4.4 - 5.2 mg/dL   Magnesium   Result Value Ref Range    Magnesium 2.5 (H) 1.6 - 2.3 mg/dL   Phosphorus   Result Value Ref Range    Phosphorus 2.9 2.5 - 4.5 mg/dL   Basic metabolic panel   Result Value Ref Range    Sodium 142 133 - 144 mmol/L     Potassium 4.0 3.4 - 5.3 mmol/L    Chloride 113 (H) 94 - 109 mmol/L    Carbon Dioxide 23 20 - 32 mmol/L    Anion Gap 6 3 - 14 mmol/L    Glucose 152 (H) 70 - 99 mg/dL    Urea Nitrogen 13 7 - 30 mg/dL    Creatinine 0.78 0.66 - 1.25 mg/dL    GFR Estimate >90 >60 mL/min/[1.73_m2]    GFR Estimate If Black >90 >60 mL/min/[1.73_m2]    Calcium 7.5 (L) 8.5 - 10.1 mg/dL   Calcium ionized whole blood   Result Value Ref Range    Calcium Ionized Whole Blood 4.5 4.4 - 5.2 mg/dL   Magnesium   Result Value Ref Range    Magnesium 2.5 (H) 1.6 - 2.3 mg/dL   Phosphorus   Result Value Ref Range    Phosphorus 3.7 2.5 - 4.5 mg/dL   Basic metabolic panel   Result Value Ref Range    Sodium 142 133 - 144 mmol/L    Potassium 4.1 3.4 - 5.3 mmol/L    Chloride 113 (H) 94 - 109 mmol/L    Carbon Dioxide 24 20 - 32 mmol/L    Anion Gap 5 3 - 14 mmol/L    Glucose 144 (H) 70 - 99 mg/dL    Urea Nitrogen 13 7 - 30 mg/dL    Creatinine 0.79 0.66 - 1.25 mg/dL    GFR Estimate >90 >60 mL/min/[1.73_m2]    GFR Estimate If Black >90 >60 mL/min/[1.73_m2]    Calcium 7.6 (L) 8.5 - 10.1 mg/dL   Albumin level   Result Value Ref Range    Albumin 2.3 (L) 3.4 - 5.0 g/dL   Calcium ionized whole blood   Result Value Ref Range    Calcium Ionized Whole Blood 4.6 4.4 - 5.2 mg/dL   Uric acid   Result Value Ref Range    Uric Acid 0.7 (L) 3.5 - 7.2 mg/dL   XR Chest Port 1 View    Narrative    HISTORY: Monitor mediastinal mass, pleural effusion, chest tube.    COMPARISON: 9/2/2019    FINDINGS: Portable upright chest at 6:51 AM. Tiny left apical  pneumothorax has nearly resolved. Right arm PICC tip in the high SVC.  Left pigtail chest tube is present and appears to be lower in position  than prior. No pleural fluid is noted. No focal pulmonary opacity.  Unchanged appearance of the wide upper mediastinum. Normal heart size.      Impression    IMPRESSION: Nearly resolved left apical pneumothorax. Left chest tube  appears lower in position than prior, it is difficult to tell  how  close the sideholes are to the chest wall. Attention on follow-up.    RYAN ANTONIO MD   Magnesium   Result Value Ref Range    Magnesium 2.6 (H) 1.6 - 2.3 mg/dL   Phosphorus   Result Value Ref Range    Phosphorus 3.4 2.5 - 4.5 mg/dL   Basic metabolic panel   Result Value Ref Range    Sodium 142 133 - 144 mmol/L    Potassium 3.9 3.4 - 5.3 mmol/L    Chloride 111 (H) 94 - 109 mmol/L    Carbon Dioxide 24 20 - 32 mmol/L    Anion Gap 7 3 - 14 mmol/L    Glucose 126 (H) 70 - 99 mg/dL    Urea Nitrogen 12 7 - 30 mg/dL    Creatinine 0.72 0.66 - 1.25 mg/dL    GFR Estimate >90 >60 mL/min/[1.73_m2]    GFR Estimate If Black >90 >60 mL/min/[1.73_m2]    Calcium 7.7 (L) 8.5 - 10.1 mg/dL   Calcium ionized whole blood   Result Value Ref Range    Calcium Ionized Whole Blood 4.6 4.4 - 5.2 mg/dL   Uric acid   Result Value Ref Range    Uric Acid 0.9 (L) 3.5 - 7.2 mg/dL   Echo Pediatric (TTE) Complete    Narrative    [id number]  [id number]  [id number]  [id number]^DIMITRIOS^SULY                                                                   Study ID: [id number]                                                 [facility] [place] [address]                                                Phone: [phone]                                Pediatric Echocardiogram  _____________________________________________________________________________  __     Name: PLACIDO RODRIGUEZ  Study Date: 2019 09:33 AM               Patient Location: URU3  MRN: [id number]                               Age: 19 yrs  : 2000                               BP: 96/49 mmHg  Gender: Male  Patient Class: Inpatient                      Height: 183 cm  Ordering Provider: SULY PLUNKETT             Weight: 75 kg                                                BSA: 2.0  m2  Performed By: Sirisha Bennett  Report approved by: Ansley Dominique MD  Reason For Study: Other Cardiac Device In Situ  _____________________________________________________________________________  __     ------CONCLUSIONS------  Normal intracardiac connections. There is a tiny circumferential pericardial  effusion. There is still a decrease in peak flow velocity in the abdominal  aorta with inspiration but is less prominent compared to previous echoes. The  left and right ventricles have normal chamber size, wall thickness, and  systolic function. The calculated single plane left ventricular ejection  fraction from the 4 chamber view is 58%. There is a large anterior mediastinal  mass causing compression of the left atrium, right and left pulmonary  arteries, and SVC. The RPA has continous flow with a mean gradient is 5 mmHg  with a peak gradient of 14 mmHg. The LPA has a peak gradient of 8 mmHg. There  is mild compression and flow acceleration through the mid level of the LA  causing a gradient with a mean of 2 mmHg. Proximal portion of the SVC is  difficult to visulize but there is mild turbulance with a mean gradient is 1-2  mmHg.     Compared to the previous echo, the pericardial effusion is drecreased in size.  There is mild compression of the RPA, LPA, mifd SVC, less than on previous  echo.  _____________________________________________________________________________  __        Technical information:  A complete two dimensional, MMODE, spectral and color Doppler transthoracic  echocardiogram is performed. Images are obtained from parasternal, apical,  subcostal and suprasternal notch views. The study quality is adequate.  Technically difficult study due to poor acoustic windows. ECG tracing shows  regular rhythm.     Segmental Anatomy:  There is normal atrial arrangement, with concordant atrioventricular and  ventriculoarterial connections.     Systemic and pulmonary veins:  The systemic  venous return is normal. Normal coronary sinus. The inferior vena  cava is of normal caliber. Proximal portion of the SVC is difficult to  visulize but the mean gradient is 1-2 mmHg . Color flow demonstrates flow from  three pulmonary veins entering the left atrium.     Atria and atrial septum:  Normal right atrial size. There is mild compression and flow acceleration  through the mid level of the LA. The mean gradient through the area of  compression is 1 mmHg. The atrial septum is not well visualized.        Atrioventricular valves:  The tricuspid valve is normal in appearance and motion. Trivial tricuspid  valve insufficiency. Estimated right ventricular systolic pressure is at least  45 mmHg plus right atrial pressure. The mitral valve is normal in appearance  and motion. There is no mitral valve insufficiency.     Ventricles and Ventricular Septum:  The left and right ventricles have normal chamber size, wall thickness, and  systolic function. The calculated single plane left ventricular ejection  fraction from the 4 chamber view is 58 %. There is no ventricular level  shunting.     Outflow tracts:  Normal great artery relationship. There is unobstructed flow through the right  ventricular outflow tract. The pulmonary valve motion is normal. There is  normal flow across the pulmonary valve. Trivial pulmonary valve insufficiency.  There is unobstructed flow through the left ventricular outflow tract.  Tricuspid aortic valve with normal appearance and motion. There is normal flow  across the aortic valve.     Great arteries:  The main pulmonary artery has normal appearance. There is unobstructed flow in  the main pulmonary artery. The pulmonary artery bifurcation is normal. There  is signficiant compression of both the RPA and the LPA from the tumor. The RPA  has continous flow with a mean gradient is 5 mmHg with a peak gradient of 14  mmHg. The LPA has a peak gradient of 8 mmHg. Normal ascending aorta.  The  aortic arch appears normal. There is unobstructed antegrade flow in the  ascending, transverse arch, descending thoracic and abdominal aorta. There is  still a variation in flow in the peak flow velocity in the abdominal aorta  with inspiration- but compared to previous echos it is getting better.     Arterial Shunts:  The ductal region is not imaged with this study.     Coronaries:  The coronary arteries are not evaluated.        Effusions, catheters, cannulas and leads:  There is a tiny circumferential pericardial effusion. There is collapse of the  right ventricular free wall in diastole.     MMode/2D Measurements & Calculations  4 Chamber EF: 58.0 %               LVMI(BSA): 116.8 grams/m2  LVMI(Height): 44.7                 RWT(MM): 0.42        Doppler Measurements & Calculations  Ao V2 max: 83.6 cm/sec                  PA V2 max: 97.2 cm/sec  Ao max P.8 mmHg                     PA max PG: 3.8 mmHg  PI end-d marialuisa: 75.2 cm/sec               TR max marialuisa: 248.7 cm/sec  PI end-d P.3 mmHg                   TR max P.7 mmHg  LPA max marialuisa: 141.2 cm/sec  LPA max P.0 mmHg  RPA max marialuisa: 175.8 cm/sec  RPA max P.4 mmHg     [place] 2D Z-SCORE VALUES  Measurement NameValue Z-ScorePredictedNormal Range  LVLd apical(4ch)8.5 cm  LVLs apical(4ch)6.6 cm     [place] Z-Scores (Measurements & Calculations)  Measurement NameValue      Z-ScorePredictedNormal Range  IVSd(MM)        0.86 cm    -0.97  1.0      0.70 - 1.31  IVSs(MM)        1.3 cm     -0.63  1.4      1.00 - 1.75  LVIDd(MM)       5.6 cm     1.2    5.2      4.4 - 5.9  LVIDs(MM)       2.9 cm     -1.3   3.4      2.6 - 4.1  LVPWd(MM)       1.2 cm     1.8    0.95     0.69 - 1.20  LVPWs(MM)       1.7 cm     0.86   1.6      1.2 - 1.9  LV mass(C)d(MM) 228.3 grams1.0    186.8    126.2 - 276.5  FS(MM)          49.4 %     3.4    34.9     28.5 - 42.6           Report approved by: Jocy Ho 2019 12:03 PM        I personally saw and examined the  patient with the fellow, Dr. Fischer as above.  I personally reviewed the laboratory and imaging results as above. I agree with the assessment and plan as above.  Heather Lopez, MSc., MD

## 2019-09-03 NOTE — PROGRESS NOTES
[facility], [place]    Pediatric Intensive Care Transfer Accept Note  Date of Service (when I saw the patient): 09/03/2019     Assessment & Plan   Lazaro Lund is a 19 year old male with acute onset cough recently found to have anterior mediastinal mass and malignant left-sided pleural effusion concerning for lymphoma versus leukemia. 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 He was admitted to [facility] oncology service yesterday, and underwent LP with intrathecal chemotherapy, PICC placement, and chest tube placement this evening 8/31. He requires continued close monitoring of respiratory status and tumor lysis labs.     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 Echo 9/2: large anterior mediastinal mass causing compression of the left atrium, right and left pulmonary arteries, and SVC.  #[id number] Protocol: 9/1 Lazaro had intrathecal LP with CSF studies, chest tube placement, bone marrow biopsy, and PICC line placed. Abdominal US (9/1) showed no HSM or LAD, but will need better imaging once can lie flat    -Heme/onc consult  -Dexamethasone 9.75 mg IV q12h (start 9/1, Day 1-14)  -Daunorubicin and Vincristine (9/1, Day 1 and 8)  -Methotrexate IT (9/8)  -Pegasparginase (9/4)  -CBC every other day (9/4)  -Coags (PTT,INR,Fibrinogen) 9/4      CV  #Pericardial effusion: shown on Echo 9/2. Improved on Echo 9/3.   #Orthopnea  #Tamponade Physiology: Echo 9/2: collapse of RV wall during diastole. decrease in peak flow velocity in the abdominal aorta with inspiration but improved from previous echo. Echo 9/3 showed improved peak flow with inspiration in abdominal aorta compared to day prior.    Gradients created by mass:    9/1 9/2 9/3   RPA Mean 6, peak 14 mean 8, peak  23 Mean 5, peak 14   LPA Peak 13 peak 12 Peak 8   LA 3 4-6 2   SVC Mean 5, peak of 10 7  with moderate turbulence 1-2       -Echo Daily to follow gradients created by compression   -Cardiology consulted  -Do NOT lay flat, head of bed > 60 degrees at all times  -Adult telemetry  - fluid goal +500 to 1L to maintain adequate CVP     #SVC Syndrome Risk   - clinically monitor for face and neck swelling, chest pain, shortness of breath, headache, blurry vision     FEN  #At risk for tumor lysis syndrome  -BMP, mag, phos, uric acid q6h  -Daily albumnin  -Allopurinol 150 mg TID.   -s/p Rasburicase 6 mg 8/31  -D5 NS @ 170/hr (1.5 MIVF), do not add K+  -Soft diet  - SLP bedside swallow eval 9/3     ID  -Bactrim ppx M/Tu    RESP  #Left pleural effusion, s/p chest tube placement (9/1)  -Oxygen NC wean as tolerated  -Chest tube to suction water seal (9/3)  -Monitor output     GI  -IV famotidine 20 mg BID for gut protection while on steroids  -Miralax 1 cap daily  -Senna BID    ENT  #Dysphagia  -Liquid only diet  -NO SOLIDS until gradients on Echo improve  -Consider swallow study after tumor has decreased in size    NEURO  -Benadryl q6h IV PRN  -Melatonin 3 mg at bedtime PRN  -Scheduled Tylenol 650 mg PO q6h scheduled     Access: PICC  Dispo: >7 days given work up and stabilization of respiratory status.    Patient seen and discussed with pediatric attending, Dr. Lutz.    Ellen Garcia MD  Pediatric Resident-PGY2  Pager #: [phone]        Interval History   Lazaro was stable overnight. He remained stable on room air. Pain was well controlled on scheduled Tylenol. He continued to tolerate a liquid diet and didn't endorse any difficulty swallowing. This morning, Lazaro was engaged on rounds and asked good questions. Mom was updated on the POC.      Physical Exam   Temp: 97.6  F (36.4  C) Temp src: Axillary BP: 110/60 Pulse: 80 Heart Rate: 82 Resp: 16 SpO2: 100 % O2 Device: None (Room air)    Vitals:    08/30/19 1523 08/30/19 1811   Weight: 75.5 kg (166 lb 7.2 oz) 75.2 kg (165 lb 12.6 oz)     Vital Signs with  Ranges  Temp:  [97.6  F (36.4  C)-98.2  F (36.8  C)] 97.6  F (36.4  C)  Pulse:  [62-93] 80  Heart Rate:  [60-84] 82  Resp:  [12-23] 16  BP: ()/(49-67) 110/60  FiO2 (%):  [21 %] 21 %  SpO2:  [92 %-100 %] 100 %  I/O last 3 completed shifts:  In: 4177.99 [P.O.:1855; I.V.:2322.99]  Out: 2627 [Urine:2275; Blood:2; Chest Tube:350]    Appearance: Alert and appropriate, well developed, nontoxic.  HEENT: Head: Atraumatic.   Eyes: PERRL, EOM grossly intact, conjunctivae and sclerae clear. Nose: Nares clear with no active discharge. Mouth/Throat: Moist mucous membranes. No oral lesions, pharynx clear with no erythema or exudate.    Lymph: decreased shoddy left cervical LAD, palpable left axillary lymph nodes   Respiratory: Mild increased work of breathing. Moderate air movement bilaterally. No rales, rhonchi, or wheezing. No stridor on exam.   Cardiovascular: Regular rate and rhythm, normal S1 and S2, with no murmurs. Heart sounds distant. Normal symmetric peripheral pulses and brisk cap refill.   Abdominal:  Soft, nontender, non-distended. No masses, but in sitting position, so hard to assess.  Neurologic: Alert and oriented, right side of mouth is drooping. Right eye wider than left. Eyebrow lift symmetric bilaterally. Smile symmetric bilaterally. Tongue protrudes symmetrically and able to move bilaterally.   Extremities/Back: No deformity.  Skin: No significant rashes, ecchymoses, or lacerations.     Medications     dextrose 5% and 0.9% NaCl 85 mL/hr at 09/03/19 1213     - MEDICATION INSTRUCTIONS -       sodium chloride         acetaminophen  650 mg Oral Q6H     allopurinol  150 mg Oral TID     INTRATHECAL chemo - Cytarabine and/or methotrexate and/or Hydrocortisone   Intrathecal Once     DAUNOrubicin (CERUBIDINE) chemo infusion  25 mg/m2 (Treatment Plan Recorded) Intravenous Q7 Days     dexamethasone  5 mg/m2 (Treatment Plan Recorded) Intravenous Q12H     famotidine  20 mg Intravenous Q12H     heparin lock flush  2-4  mL Intracatheter Q24H     heparin lock flush  2-4 mL Intracatheter Q24H     [START ON 9/8/2019] INTRATHECAL chemo - Cytarabine and/or methotrexate and/or Hydrocortisone   Intrathecal Once     [START ON 9/8/2019] ondansetron  8 mg Intravenous Q6H     [START ON 9/4/2019] pegasparginase (ONCASPAR) infusion  2,500 Units/m2 (Treatment Plan Recorded) Intravenous Once     polyethylene glycol  17 g Oral Daily     sodium chloride (PF)  3 mL Intracatheter Q8H     sulfamethoxazole-trimethoprim  1 tablet Oral Q Mon Tues BID     vinCRIStine (ONCOVIN) chemo infusion  2 mg Intravenous Q7 Days       Data   Results for orders placed or performed during the hospital encounter of 08/30/19 (from the past 24 hour(s))   Magnesium   Result Value Ref Range    Magnesium 2.5 (H) 1.6 - 2.3 mg/dL   Phosphorus   Result Value Ref Range    Phosphorus 2.9 2.5 - 4.5 mg/dL   Basic metabolic panel   Result Value Ref Range    Sodium 142 133 - 144 mmol/L    Potassium 4.0 3.4 - 5.3 mmol/L    Chloride 113 (H) 94 - 109 mmol/L    Carbon Dioxide 23 20 - 32 mmol/L    Anion Gap 6 3 - 14 mmol/L    Glucose 152 (H) 70 - 99 mg/dL    Urea Nitrogen 13 7 - 30 mg/dL    Creatinine 0.78 0.66 - 1.25 mg/dL    GFR Estimate >90 >60 mL/min/[1.73_m2]    GFR Estimate If Black >90 >60 mL/min/[1.73_m2]    Calcium 7.5 (L) 8.5 - 10.1 mg/dL   Calcium ionized whole blood   Result Value Ref Range    Calcium Ionized Whole Blood 4.5 4.4 - 5.2 mg/dL   Magnesium   Result Value Ref Range    Magnesium 2.5 (H) 1.6 - 2.3 mg/dL   Phosphorus   Result Value Ref Range    Phosphorus 3.7 2.5 - 4.5 mg/dL   Basic metabolic panel   Result Value Ref Range    Sodium 142 133 - 144 mmol/L    Potassium 4.1 3.4 - 5.3 mmol/L    Chloride 113 (H) 94 - 109 mmol/L    Carbon Dioxide 24 20 - 32 mmol/L    Anion Gap 5 3 - 14 mmol/L    Glucose 144 (H) 70 - 99 mg/dL    Urea Nitrogen 13 7 - 30 mg/dL    Creatinine 0.79 0.66 - 1.25 mg/dL    GFR Estimate >90 >60 mL/min/[1.73_m2]    GFR Estimate If Black >90 >60  mL/min/[1.73_m2]    Calcium 7.6 (L) 8.5 - 10.1 mg/dL   Albumin level   Result Value Ref Range    Albumin 2.3 (L) 3.4 - 5.0 g/dL   Calcium ionized whole blood   Result Value Ref Range    Calcium Ionized Whole Blood 4.6 4.4 - 5.2 mg/dL   Uric acid   Result Value Ref Range    Uric Acid 0.7 (L) 3.5 - 7.2 mg/dL   XR Chest Port 1 View    Narrative    HISTORY: Monitor mediastinal mass, pleural effusion, chest tube.    COMPARISON: 9/2/2019    FINDINGS: Portable upright chest at 6:51 AM. Tiny left apical  pneumothorax has nearly resolved. Right arm PICC tip in the high SVC.  Left pigtail chest tube is present and appears to be lower in position  than prior. No pleural fluid is noted. No focal pulmonary opacity.  Unchanged appearance of the wide upper mediastinum. Normal heart size.      Impression    IMPRESSION: Nearly resolved left apical pneumothorax. Left chest tube  appears lower in position than prior, it is difficult to tell how  close the sideholes are to the chest wall. Attention on follow-up.    RYAN ANTONIO MD   Magnesium   Result Value Ref Range    Magnesium 2.6 (H) 1.6 - 2.3 mg/dL   Phosphorus   Result Value Ref Range    Phosphorus 3.4 2.5 - 4.5 mg/dL   Basic metabolic panel   Result Value Ref Range    Sodium 142 133 - 144 mmol/L    Potassium 3.9 3.4 - 5.3 mmol/L    Chloride 111 (H) 94 - 109 mmol/L    Carbon Dioxide 24 20 - 32 mmol/L    Anion Gap 7 3 - 14 mmol/L    Glucose 126 (H) 70 - 99 mg/dL    Urea Nitrogen 12 7 - 30 mg/dL    Creatinine 0.72 0.66 - 1.25 mg/dL    GFR Estimate >90 >60 mL/min/[1.73_m2]    GFR Estimate If Black >90 >60 mL/min/[1.73_m2]    Calcium 7.7 (L) 8.5 - 10.1 mg/dL   Calcium ionized whole blood   Result Value Ref Range    Calcium Ionized Whole Blood 4.6 4.4 - 5.2 mg/dL   Uric acid   Result Value Ref Range    Uric Acid 0.9 (L) 3.5 - 7.2 mg/dL   Echo Pediatric (TTE) Complete    Narrative    [id number]  [id number]  [id number]  [id number]^SULY                                                                    Study ID: [id number]                                                 [facility]                                                  [address]                                                [place] [address]                                                Phone: [phone]                                Pediatric Echocardiogram  _____________________________________________________________________________  __     Name: PLACIDO RODRIGUEZ  Study Date: 2019 09:33 AM               Patient Location: URU3  MRN: [id number]                               Age: 19 yrs  : 2000                               BP: 96/49 mmHg  Gender: Male  Patient Class: Inpatient                      Height: 183 cm  Ordering Provider: SULY PLUNKETT             Weight: 75 kg                                                BSA: 2.0 m2  Performed By: Sirisha Bennett  Report approved by: Ansley Dominique MD  Reason For Study: Other Cardiac Device In Situ  _____________________________________________________________________________  __     ------CONCLUSIONS------  Normal intracardiac connections. There is a tiny circumferential pericardial  effusion. There is still a decrease in peak flow velocity in the abdominal  aorta with inspiration but is less prominent compared to previous echoes. The  left and right ventricles have normal chamber size, wall thickness, and  systolic function. The calculated single plane left ventricular ejection  fraction from the 4 chamber view is 58%. There is a large anterior mediastinal  mass causing compression of the left atrium, right and left pulmonary  arteries, and SVC. The RPA has continous flow with a mean gradient is 5 mmHg  with a peak gradient of 14 mmHg. The LPA has a peak gradient of 8 mmHg. There  is mild compression and flow acceleration through the mid level of the LA  causing a  gradient with a mean of 2 mmHg. Proximal portion of the SVC is  difficult to visulize but there is mild turbulance with a mean gradient is 1-2  mmHg.     Compared to the previous echo, the pericardial effusion is drecreased in size.  There is mild compression of the RPA, LPA, mifd SVC, less than on previous  echo.  _____________________________________________________________________________  __        Technical information:  A complete two dimensional, MMODE, spectral and color Doppler transthoracic  echocardiogram is performed. Images are obtained from parasternal, apical,  subcostal and suprasternal notch views. The study quality is adequate.  Technically difficult study due to poor acoustic windows. ECG tracing shows  regular rhythm.     Segmental Anatomy:  There is normal atrial arrangement, with concordant atrioventricular and  ventriculoarterial connections.     Systemic and pulmonary veins:  The systemic venous return is normal. Normal coronary sinus. The inferior vena  cava is of normal caliber. Proximal portion of the SVC is difficult to  visulize but the mean gradient is 1-2 mmHg . Color flow demonstrates flow from  three pulmonary veins entering the left atrium.     Atria and atrial septum:  Normal right atrial size. There is mild compression and flow acceleration  through the mid level of the LA. The mean gradient through the area of  compression is 1 mmHg. The atrial septum is not well visualized.        Atrioventricular valves:  The tricuspid valve is normal in appearance and motion. Trivial tricuspid  valve insufficiency. Estimated right ventricular systolic pressure is at least  45 mmHg plus right atrial pressure. The mitral valve is normal in appearance  and motion. There is no mitral valve insufficiency.     Ventricles and Ventricular Septum:  The left and right ventricles have normal chamber size, wall thickness, and  systolic function. The calculated single plane left ventricular  ejection  fraction from the 4 chamber view is 58 %. There is no ventricular level  shunting.     Outflow tracts:  Normal great artery relationship. There is unobstructed flow through the right  ventricular outflow tract. The pulmonary valve motion is normal. There is  normal flow across the pulmonary valve. Trivial pulmonary valve insufficiency.  There is unobstructed flow through the left ventricular outflow tract.  Tricuspid aortic valve with normal appearance and motion. There is normal flow  across the aortic valve.     Great arteries:  The main pulmonary artery has normal appearance. There is unobstructed flow in  the main pulmonary artery. The pulmonary artery bifurcation is normal. There  is signficiant compression of both the RPA and the LPA from the tumor. The RPA  has continous flow with a mean gradient is 5 mmHg with a peak gradient of 14  mmHg. The LPA has a peak gradient of 8 mmHg. Normal ascending aorta. The  aortic arch appears normal. There is unobstructed antegrade flow in the  ascending, transverse arch, descending thoracic and abdominal aorta. There is  still a variation in flow in the peak flow velocity in the abdominal aorta  with inspiration- but compared to previous echos it is getting better.     Arterial Shunts:  The ductal region is not imaged with this study.     Coronaries:  The coronary arteries are not evaluated.        Effusions, catheters, cannulas and leads:  There is a tiny circumferential pericardial effusion. There is collapse of the  right ventricular free wall in diastole.     MMode/2D Measurements & Calculations  4 Chamber EF: 58.0 %               LVMI(BSA): 116.8 grams/m2  LVMI(Height): 44.7                 RWT(MM): 0.42        Doppler Measurements & Calculations  Ao V2 max: 83.6 cm/sec                  PA V2 max: 97.2 cm/sec  Ao max P.8 mmHg                     PA max PG: 3.8 mmHg  PI end-d marialuisa: 75.2 cm/sec               TR max marialuisa: 248.7 cm/sec  PI end-d P.3 mmHg                    TR max P.7 mmHg  LPA max marialuisa: 141.2 cm/sec  LPA max P.0 mmHg  RPA max marialuisa: 175.8 cm/sec  RPA max P.4 mmHg     [place] 2D Z-SCORE VALUES  Measurement NameValue Z-ScorePredictedNormal Range  LVLd apical(4ch)8.5 cm  LVLs apical(4ch)6.6 cm     [place] Z-Scores (Measurements & Calculations)  Measurement NameValue      Z-ScorePredictedNormal Range  IVSd(MM)        0.86 cm    -0.97  1.0      0.70 - 1.31  IVSs(MM)        1.3 cm     -0.63  1.4      1.00 - 1.75  LVIDd(MM)       5.6 cm     1.2    5.2      4.4 - 5.9  LVIDs(MM)       2.9 cm     -1.3   3.4      2.6 - 4.1  LVPWd(MM)       1.2 cm     1.8    0.95     0.69 - 1.20  LVPWs(MM)       1.7 cm     0.86   1.6      1.2 - 1.9  LV mass(C)d(MM) 228.3 grams1.0    186.8    126.2 - 276.5  FS(MM)          49.4 %     3.4    34.9     28.5 - 42.6           Report approved by: Jocy Ho 2019 12:03 PM      Magnesium   Result Value Ref Range    Magnesium 2.5 (H) 1.6 - 2.3 mg/dL   Phosphorus   Result Value Ref Range    Phosphorus 3.6 2.5 - 4.5 mg/dL   Basic metabolic panel   Result Value Ref Range    Sodium 141 133 - 144 mmol/L    Potassium 3.9 3.4 - 5.3 mmol/L    Chloride 109 94 - 109 mmol/L    Carbon Dioxide 27 20 - 32 mmol/L    Anion Gap 5 3 - 14 mmol/L    Glucose 121 (H) 70 - 99 mg/dL    Urea Nitrogen 13 7 - 30 mg/dL    Creatinine 0.68 0.66 - 1.25 mg/dL    GFR Estimate >90 >60 mL/min/[1.73_m2]    GFR Estimate If Black >90 >60 mL/min/[1.73_m2]    Calcium 7.8 (L) 8.5 - 10.1 mg/dL   Calcium ionized whole blood   Result Value Ref Range    Calcium Ionized Whole Blood 4.6 4.4 - 5.2 mg/dL   Uric acid   Result Value Ref Range    Uric Acid 1.1 (L) 3.5 - 7.2 mg/dL     Pediatric Critical Care Progress Note:    Lazaro Lund 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 All  physician orders and treatments were placed at my direction.  Formulated plan with the house staff team or resident(s) and agree with the findings and plan in this note.  I have evaluated all laboratory values and imaging studies from the past 24 hours.  Consults ongoing and ordered are Oncology  I personally managed the respiratory and hemodynamic support, metabolic abnormalities, nutritional status, antimicrobial therapy, and pain/sedation management.   Key decisions made today included place CT to water seal and monitor respiratory status and pleural effusion with plan to remove tomorrow if stable. Continue dexamethasone and chemotherapeutics per oncology. Speech eval to advance diet if safe to swallow.  Pain well controlled.  Procedures that will happen in the ICU today are: none  The above plans and care have been discussed with patient and mother and all questions and concerns were addressed.  I spent a total of 40 minutes providing critical care services at the bedside, and on the critical care unit, evaluating the patient, directing care and reviewing laboratory values and radiologic reports for Lazaro Lund.    Saida Lutz MD

## 2019-09-03 NOTE — PROGRESS NOTES
CLINICAL NUTRITION SERVICES - PEDIATRIC ASSESSMENT NOTE    REASON FOR ASSESSMENT  Lazaro Lund is a 19 year old male seen by the dietitian for positive risk screen for reduced oral intake and weight loss + consult r/t new dx of cancer.     ANTHROPOMETRICS  Height (8/30): 182.5 cm    Weight (8/30): 75.2 kg  BMI: 22.5 kg/m^2; within normal range  Dosing Weight: 75.2 kg (admit weight)    NUTRITION HISTORY  Lazaro eats a regular diet at home. He has no known allergies. Typical intake:  Breakfast: usually eats egg burrito or croissant sandwich at work  Lunch: salad or soup  Dinner: pasta, pizza, or burgers  Beverages: soda (not a lot), a lot of water, 1-2 beers/week, milk only rarely with cereal  Information obtained from patient  Factors affecting nutrition intake include:medical course; limited to liquid diet currently due to tumor and r/t dysphagia    Lazaro reports weight loss of 8 lb in 2 weeks (~5%). He did not notice a significant decrease in appetite but says the weight loss could be due to stress and eating a bit less.     CURRENT NUTRITION ORDERS  Diet: full liquid diet    CURRENT NUTRITION SUPPORT   Pt not currently on nutrition support.     PHYSICAL FINDINGS  Observed  Patient seated in bed; appears fairly proportional   Obtained from Chart/Interdisciplinary Team  Goal for 500-1000 mL positive fluid balance daily per PICU team, no stools since admit    LABS  Labs reviewed    MEDICATIONS  Medications reviewed  D5 + 0.9 NS @ 170 mL/hr = 204 g DEX (GIR=1.8 mg/kg/min; 694 kcal)    ASSESSED NUTRITION NEEDS:  BMR (MSJ): 1800 kcal x 1.2-1.3 = 5766-6352 kcal  Estimated Energy Needs: 30-35 kcal/kg EN/PO; 25-30 kcal/kg PN  Estimated Protein Needs: 1-1.1 g/kg  Estimated Fluid Needs: per MD  Micronutrient Needs: RDA    MALNUTRITION  % Intake: Decreased intake does not meet criteria  % Weight Loss: > 2% in 1 week (severe); loss of 5% in 2 weeks  Malnutrition Diagnosis: Patient does not meet two of the above criteria  necessary for diagnosing malnutrition but is at risk for malnutrition due to current diet limitations and medical course.    NUTRITION DIAGNOSIS:  Predicted suboptimal energy intake related to limited diet and decreased appetite as evidenced by on full liquid diet.     INTERVENTIONS  Nutrition Prescription  Will receive assessed nutrition needs to support weight stabilization throughout hospitalization.  Nutrition Education:   Met with Lazaro and discussed role of RD and availability of nutritional supplements as necessary to support PO intake. Also discussed that SLP will evaluate ability to advance diet.     Implementation:   Education as above.  Collaboration and Referral of Nutrition Care: Rounded with team. See recommendations regarding nutritional plan of care below.    Goals  1. No further weight loss and will maintain weight during hospitalization.     2. Will receive 100% estimated calorie and protein needs during hospitalization.     FOLLOW UP/MONITORING   Food and Beverage intake   Enteral and parenteral nutrition intake (need for)  Anthropometric measurements (weight)    RECOMMENDATIONS   1. Suggest nutritional supplement such as Boost Breeze, Ensure, or Pediasure if unable to consume adequate calories from full liquid diet or needs additional support once diet advanced.  2. If enteral nutrition desired, suggest IsoSource 1.5 at initial rate of 60 mL/hr x 24 hr = 1440 mL, 2160 kcal (29 kcal/kg), 98 g pro (1.3 g/kg) to meet 100% estimated baseline needs or 60 mL/hr x 12 hrs to provide 50% estimated baseline needs.  -with this regimen, will need likely require provision of additional fluids to meet requirements per team  -calorie provision of feeds to be adjusted by RD pending PO intake, weight trends, medical course  3. Daily weights.     Ermelinda Avilez, PhD, RD, LD   Coverage for Marcella Ibarra, RD, CSP, LD  Pager: [phone]

## 2019-09-04 ENCOUNTER — APPOINTMENT (OUTPATIENT)
Dept: GENERAL RADIOLOGY | Facility: CLINIC | Age: 19
DRG: 847 | End: 2019-09-04
Attending: STUDENT IN AN ORGANIZED HEALTH CARE EDUCATION/TRAINING PROGRAM
Payer: COMMERCIAL

## 2019-09-04 ENCOUNTER — APPOINTMENT (OUTPATIENT)
Dept: CARDIOLOGY | Facility: CLINIC | Age: 19
DRG: 847 | End: 2019-09-04
Attending: STUDENT IN AN ORGANIZED HEALTH CARE EDUCATION/TRAINING PROGRAM
Payer: COMMERCIAL

## 2019-09-04 LAB
ALBUMIN SERPL-MCNC: 2.3 G/DL (ref 3.4–5)
ANION GAP SERPL CALCULATED.3IONS-SCNC: 10 MMOL/L (ref 3–14)
ANION GAP SERPL CALCULATED.3IONS-SCNC: 8 MMOL/L (ref 3–14)
ANION GAP SERPL CALCULATED.3IONS-SCNC: 8 MMOL/L (ref 3–14)
APPEARANCE CSF: CLEAR
APPEARANCE FLD: NORMAL
APTT PPP: 29 SEC (ref 22–37)
BASOPHILS # BLD AUTO: 0 10E9/L (ref 0–0.2)
BASOPHILS NFR BLD AUTO: 0.1 %
BUN SERPL-MCNC: 14 MG/DL (ref 7–30)
BUN SERPL-MCNC: 16 MG/DL (ref 7–30)
BUN SERPL-MCNC: 19 MG/DL (ref 7–30)
CA-I BLD-MCNC: 4.6 MG/DL (ref 4.4–5.2)
CA-I BLD-MCNC: 4.8 MG/DL (ref 4.4–5.2)
CA-I BLD-MCNC: 4.8 MG/DL (ref 4.4–5.2)
CALCIUM SERPL-MCNC: 7.5 MG/DL (ref 8.5–10.1)
CALCIUM SERPL-MCNC: 7.5 MG/DL (ref 8.5–10.1)
CALCIUM SERPL-MCNC: 7.8 MG/DL (ref 8.5–10.1)
CHLORIDE SERPL-SCNC: 107 MMOL/L (ref 94–109)
CHLORIDE SERPL-SCNC: 110 MMOL/L (ref 94–109)
CHLORIDE SERPL-SCNC: 111 MMOL/L (ref 94–109)
CO2 SERPL-SCNC: 22 MMOL/L (ref 20–32)
CO2 SERPL-SCNC: 23 MMOL/L (ref 20–32)
CO2 SERPL-SCNC: 23 MMOL/L (ref 20–32)
COLOR CSF: COLORLESS
COLOR FLD: YELLOW
COPATH REPORT: NORMAL
CREAT SERPL-MCNC: 0.62 MG/DL (ref 0.66–1.25)
CREAT SERPL-MCNC: 0.65 MG/DL (ref 0.66–1.25)
CREAT SERPL-MCNC: 0.67 MG/DL (ref 0.66–1.25)
DIFFERENTIAL METHOD BLD: ABNORMAL
EOSINOPHIL # BLD AUTO: 0 10E9/L (ref 0–0.7)
EOSINOPHIL NFR BLD AUTO: 0 %
EOSINOPHIL NFR FLD MANUAL: 1 %
ERYTHROCYTE [DISTWIDTH] IN BLOOD BY AUTOMATED COUNT: 11.8 % (ref 10–15)
FIBRINOGEN PPP-MCNC: 292 MG/DL (ref 200–420)
GFR SERPL CREATININE-BSD FRML MDRD: >90 ML/MIN/{1.73_M2}
GLUCOSE SERPL-MCNC: 106 MG/DL (ref 70–99)
GLUCOSE SERPL-MCNC: 146 MG/DL (ref 70–99)
GLUCOSE SERPL-MCNC: 96 MG/DL (ref 70–99)
HCT VFR BLD AUTO: 36.6 % (ref 40–53)
HGB BLD-MCNC: 12 G/DL (ref 13.3–17.7)
IMM GRANULOCYTES # BLD: 0 10E9/L (ref 0–0.4)
IMM GRANULOCYTES NFR BLD: 0.4 %
INR PPP: 1.2 (ref 0.86–1.14)
LYMPHOCYTES # BLD AUTO: 0.3 10E9/L (ref 0.8–5.3)
LYMPHOCYTES NFR BLD AUTO: 3.6 %
LYMPHOCYTES NFR FLD MANUAL: 73 %
MAGNESIUM SERPL-MCNC: 2.1 MG/DL (ref 1.6–2.3)
MAGNESIUM SERPL-MCNC: 2.5 MG/DL (ref 1.6–2.3)
MCH RBC QN AUTO: 26.7 PG (ref 26.5–33)
MCHC RBC AUTO-ENTMCNC: 32.8 G/DL (ref 31.5–36.5)
MCV RBC AUTO: 82 FL (ref 78–100)
MONOCYTES # BLD AUTO: 0.3 10E9/L (ref 0–1.3)
MONOCYTES NFR BLD AUTO: 3.5 %
MONOS+MACROS NFR FLD MANUAL: 25 %
NEUTROPHILS # BLD AUTO: 8.5 10E9/L (ref 1.6–8.3)
NEUTROPHILS NFR BLD AUTO: 92.4 %
NEUTS BAND NFR FLD MANUAL: 1 %
NRBC # BLD AUTO: 0 10*3/UL
NRBC BLD AUTO-RTO: 0 /100
PHOSPHATE SERPL-MCNC: 3.2 MG/DL (ref 2.5–4.5)
PHOSPHATE SERPL-MCNC: 3.8 MG/DL (ref 2.5–4.5)
PHOSPHATE SERPL-MCNC: 3.8 MG/DL (ref 2.5–4.5)
PLATELET # BLD AUTO: 304 10E9/L (ref 150–450)
POTASSIUM SERPL-SCNC: 3.9 MMOL/L (ref 3.4–5.3)
POTASSIUM SERPL-SCNC: 4.1 MMOL/L (ref 3.4–5.3)
POTASSIUM SERPL-SCNC: 4.2 MMOL/L (ref 3.4–5.3)
RBC # BLD AUTO: 4.49 10E12/L (ref 4.4–5.9)
RBC # CSF MANUAL: 0 /UL (ref 0–2)
SODIUM SERPL-SCNC: 140 MMOL/L (ref 133–144)
SODIUM SERPL-SCNC: 141 MMOL/L (ref 133–144)
SODIUM SERPL-SCNC: 141 MMOL/L (ref 133–144)
SPECIMEN SOURCE FLD: NORMAL
TUBE # CSF: 2 #
URATE SERPL-MCNC: 0.7 MG/DL (ref 3.5–7.2)
URATE SERPL-MCNC: 0.9 MG/DL (ref 3.5–7.2)
URATE SERPL-MCNC: 0.9 MG/DL (ref 3.5–7.2)
URATE SERPL-MCNC: 1.2 MG/DL (ref 3.5–7.2)
WBC # BLD AUTO: 9.2 10E9/L (ref 4–11)
WBC # CSF MANUAL: 0 /UL (ref 0–5)
WBC # FLD AUTO: NORMAL /UL

## 2019-09-04 PROCEDURE — 85730 THROMBOPLASTIN TIME PARTIAL: CPT | Performed by: STUDENT IN AN ORGANIZED HEALTH CARE EDUCATION/TRAINING PROGRAM

## 2019-09-04 PROCEDURE — 93306 TTE W/DOPPLER COMPLETE: CPT

## 2019-09-04 PROCEDURE — 71045 X-RAY EXAM CHEST 1 VIEW: CPT | Mod: 77

## 2019-09-04 PROCEDURE — 82330 ASSAY OF CALCIUM: CPT | Performed by: STUDENT IN AN ORGANIZED HEALTH CARE EDUCATION/TRAINING PROGRAM

## 2019-09-04 PROCEDURE — 85384 FIBRINOGEN ACTIVITY: CPT | Performed by: STUDENT IN AN ORGANIZED HEALTH CARE EDUCATION/TRAINING PROGRAM

## 2019-09-04 PROCEDURE — 25000128 H RX IP 250 OP 636: Performed by: RADIOLOGY

## 2019-09-04 PROCEDURE — 84100 ASSAY OF PHOSPHORUS: CPT | Performed by: STUDENT IN AN ORGANIZED HEALTH CARE EDUCATION/TRAINING PROGRAM

## 2019-09-04 PROCEDURE — 25800030 ZZH RX IP 258 OP 636: Performed by: PEDIATRICS

## 2019-09-04 PROCEDURE — 71045 X-RAY EXAM CHEST 1 VIEW: CPT

## 2019-09-04 PROCEDURE — 25000132 ZZH RX MED GY IP 250 OP 250 PS 637: Performed by: STUDENT IN AN ORGANIZED HEALTH CARE EDUCATION/TRAINING PROGRAM

## 2019-09-04 PROCEDURE — 80069 RENAL FUNCTION PANEL: CPT | Performed by: STUDENT IN AN ORGANIZED HEALTH CARE EDUCATION/TRAINING PROGRAM

## 2019-09-04 PROCEDURE — 84550 ASSAY OF BLOOD/URIC ACID: CPT | Performed by: STUDENT IN AN ORGANIZED HEALTH CARE EDUCATION/TRAINING PROGRAM

## 2019-09-04 PROCEDURE — 25800030 ZZH RX IP 258 OP 636

## 2019-09-04 PROCEDURE — 80048 BASIC METABOLIC PNL TOTAL CA: CPT | Performed by: STUDENT IN AN ORGANIZED HEALTH CARE EDUCATION/TRAINING PROGRAM

## 2019-09-04 PROCEDURE — 36415 COLL VENOUS BLD VENIPUNCTURE: CPT | Performed by: STUDENT IN AN ORGANIZED HEALTH CARE EDUCATION/TRAINING PROGRAM

## 2019-09-04 PROCEDURE — 25000132 ZZH RX MED GY IP 250 OP 250 PS 637

## 2019-09-04 PROCEDURE — 83735 ASSAY OF MAGNESIUM: CPT | Performed by: STUDENT IN AN ORGANIZED HEALTH CARE EDUCATION/TRAINING PROGRAM

## 2019-09-04 PROCEDURE — 12000014 ZZH R&B PEDS UMMC

## 2019-09-04 PROCEDURE — 85025 COMPLETE CBC W/AUTO DIFF WBC: CPT | Performed by: STUDENT IN AN ORGANIZED HEALTH CARE EDUCATION/TRAINING PROGRAM

## 2019-09-04 PROCEDURE — 25000128 H RX IP 250 OP 636: Performed by: STUDENT IN AN ORGANIZED HEALTH CARE EDUCATION/TRAINING PROGRAM

## 2019-09-04 PROCEDURE — 85610 PROTHROMBIN TIME: CPT | Performed by: STUDENT IN AN ORGANIZED HEALTH CARE EDUCATION/TRAINING PROGRAM

## 2019-09-04 PROCEDURE — 25000128 H RX IP 250 OP 636: Performed by: PEDIATRICS

## 2019-09-04 PROCEDURE — 00000346 ZZHCL STATISTIC REVIEW OUTSIDE SLIDES TC 88321: Performed by: STUDENT IN AN ORGANIZED HEALTH CARE EDUCATION/TRAINING PROGRAM

## 2019-09-04 RX ORDER — OXYCODONE HYDROCHLORIDE 5 MG/1
5 TABLET ORAL EVERY 6 HOURS PRN
Status: DISCONTINUED | OUTPATIENT
Start: 2019-09-04 | End: 2019-09-09 | Stop reason: HOSPADM

## 2019-09-04 RX ORDER — NALOXONE HYDROCHLORIDE 0.4 MG/ML
.1-.4 INJECTION, SOLUTION INTRAMUSCULAR; INTRAVENOUS; SUBCUTANEOUS
Status: DISCONTINUED | OUTPATIENT
Start: 2019-09-04 | End: 2019-09-09 | Stop reason: HOSPADM

## 2019-09-04 RX ADMIN — FAMOTIDINE 20 MG: 20 INJECTION, SOLUTION INTRAVENOUS at 07:54

## 2019-09-04 RX ADMIN — Medication 150 MG: at 20:03

## 2019-09-04 RX ADMIN — ACETAMINOPHEN 650 MG: 325 TABLET, FILM COATED ORAL at 14:53

## 2019-09-04 RX ADMIN — FAMOTIDINE 20 MG: 20 INJECTION, SOLUTION INTRAVENOUS at 20:03

## 2019-09-04 RX ADMIN — ACETAMINOPHEN 650 MG: 325 TABLET, FILM COATED ORAL at 07:55

## 2019-09-04 RX ADMIN — SENNOSIDES 8.6 MG: 8.6 TABLET, FILM COATED ORAL at 10:28

## 2019-09-04 RX ADMIN — ACETAMINOPHEN 650 MG: 325 TABLET, FILM COATED ORAL at 01:41

## 2019-09-04 RX ADMIN — DEXTROSE AND SODIUM CHLORIDE 1000 ML: 5; 900 INJECTION, SOLUTION INTRAVENOUS at 09:21

## 2019-09-04 RX ADMIN — OXYCODONE HYDROCHLORIDE 5 MG: 5 TABLET ORAL at 17:44

## 2019-09-04 RX ADMIN — DEXAMETHASONE SODIUM PHOSPHATE 9.76 MG: 4 INJECTION, SOLUTION INTRAMUSCULAR; INTRAVENOUS at 20:03

## 2019-09-04 RX ADMIN — Medication 150 MG: at 17:24

## 2019-09-04 RX ADMIN — PEGASPARGASE 4875 UNITS: 750 INJECTION, SOLUTION INTRAMUSCULAR; INTRAVENOUS at 09:08

## 2019-09-04 RX ADMIN — POLYETHYLENE GLYCOL 3350 17 G: 17 POWDER, FOR SOLUTION ORAL at 07:55

## 2019-09-04 RX ADMIN — ALTEPLASE 2 MG: 2.2 INJECTION, POWDER, LYOPHILIZED, FOR SOLUTION INTRAVENOUS at 19:19

## 2019-09-04 RX ADMIN — DEXTROSE AND SODIUM CHLORIDE 1000 ML: 5; 900 INJECTION, SOLUTION INTRAVENOUS at 17:25

## 2019-09-04 RX ADMIN — DEXAMETHASONE SODIUM PHOSPHATE 9.76 MG: 4 INJECTION, SOLUTION INTRAMUSCULAR; INTRAVENOUS at 07:55

## 2019-09-04 RX ADMIN — ACETAMINOPHEN 650 MG: 325 TABLET, FILM COATED ORAL at 20:03

## 2019-09-04 RX ADMIN — Medication 150 MG: at 07:55

## 2019-09-04 RX ADMIN — Medication 2 ML: at 20:59

## 2019-09-04 RX ADMIN — ALTEPLASE 2 MG: 2.2 INJECTION, POWDER, LYOPHILIZED, FOR SOLUTION INTRAVENOUS at 20:52

## 2019-09-04 RX ADMIN — Medication 2 ML: at 22:26

## 2019-09-04 ASSESSMENT — MIFFLIN-ST. JEOR: SCORE: 1823.62

## 2019-09-04 NOTE — PLAN OF CARE
Afebrile. Stable on RA. Chest tubes water sealed, putting out 60 mL of serous fluid during shift. Bradycardic into the 40s overnight, resident notified, hemodynamically stable. Voiding, no stool this shift. Parents at bedside overnight. Will continue to monitor and intervene if needed.

## 2019-09-04 NOTE — PROGRESS NOTES
[facility], [place]    Pediatric Intensive Care Transfer Accept Note  Date of Service (when I saw the patient): 09/04/2019     Assessment & Plan   Lazaro Lund is a 19 year old male with acute onset cough recently found to have anterior mediastinal mass and malignant left-sided pleural effusion, for which the results (8/30) of a CT -guided mass biopsy obtained 1 week prior to admission at [facility] were concerning for T-cell leukemia vs lymphoma. He was later diagnosed with T-Cell lymphoblastic lymphoma. He was admitted to [facility] oncology service 8/30, and underwent LP with intrathecal chemotherapy, PICC placement, and chest tube placement 8/31. Respiratory status, hemodynamics, and tumor lysis labs have all been stable, and the has been improvement in the RPA, LPA, LA, and SVC gradients on echo. Chest tube was pulled 9/4 and follow up chest xray was non-concerning. Lazaro is safe for transfer out of the PICU and to the floor.     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 Echo 9/2: large anterior mediastinal mass causing compression of the left atrium, right and left pulmonary arteries, and SVC.  #[id number] Protocol: 9/1 Lazaro had intrathecal LP with CSF studies, chest tube placement, bone marrow biopsy, and PICC line placed. Abdominal US (9/1) showed no HSM or LAD, but will need better imaging once can lie flat    -Heme/onc consult  -Dexamethasone 9.75 mg IV q12h (start 9/1, Day 1-14)  -Daunorubicin and Vincristine (9/1, Day 1 and 8)  -Methotrexate IT (9/8)  -Pegasparginase (9/4)  -CBC every other day (9/4)  -Coags (PTT,INR,Fibrinogen) 9/4      CV  #Pericardial effusion: shown on Echo 9/2. Improved on Echo 9/3, and 9/4.   #Orthopnea  #Tamponade Physiology: Echo 9/2: collapse of RV wall during diastole. decrease in peak flow velocity in the abdominal aorta with inspiration but improved from previous echo. Echo 9/3 showed improved  peak flow with inspiration in abdominal aorta compared to day prior. 9/4: There is still a variation in flow in the peak flow velocity in the abdominal aorta with inspiration- but compared to previous echos it is getting better.   Gradients created by mass:    9/1 9/2 9/3 9/4   RPA Mean 6, peak 14 mean 8, peak  23 Mean 5, peak 14 Mean 5, peak 14   LPA Peak 13 peak 12 Peak 8 Peak 8   LA 3 4-6 2 1   SVC Mean 5, peak of 10 7 with moderate turbulence 1-2 1-2       -Echo Daily to follow gradients created by compression   -Cardiology consulted  -Do NOT lay flat, head of bed > 60 degrees at all times  -Adult telemetry  - fluid goal +500 to 1L to maintain adequate CVP     #SVC Syndrome Risk   - clinically monitor for face and neck swelling, chest pain, shortness of breath, headache, blurry vision     FEN  #At risk for tumor lysis syndrome  -BMP, mag, phos, uric acid q6h  -Daily albumnin  -Allopurinol 150 mg TID.   -s/p Rasburicase 6 mg 8/31  -D5 NS @ 170/hr (1x Maintenance), do not add K+  -Soft diet  -SLP bedside swallow eval 9/4     ID  -Bactrim ppx M/Tu    RESP  #Left pleural effusion, s/p chest tube placement (9/1)  -Stable on room air   -Chest tube to suction water seal (9/3) and pulled 9/4. CXR 1hr after chest tube pulled non-concerning  -Monitor output, decreasing since chest tube placement     GI  -IV famotidine 20 mg BID for gut protection while on steroids  -Miralax 1 cap daily  -Senna BID    ENT  #Dysphagia  -Soft diet  -NO SOLIDS until gradients on Echo improve  -Consider swallow study after tumor has decreased in size    NEURO  -Benadryl q6h IV PRN  -Melatonin 3 mg at bedtime PRN  -Scheduled Tylenol 650 mg PO q6h scheduled     Access: PICC  Dispo: >7 days given work up and stabilization of respiratory status.    Patient seen and discussed with pediatric attending, Dr. Lutz.    Ellen Garcia MD  Pediatric Resident-PGY2  Pager #: [phone]        Interval History   Lazaro was stable overnight. He remained  stable on room air. Pain was well controlled on scheduled Tylenol. He continued to tolerate a soft diet and didn't endorse any difficulty swallowing. This morning, Lazaro was engaged on rounds and asked good questions. Mom was updated on the POC. Chest tube was pulled today and he was transferred to the floor.      Physical Exam   Temp: 98  F (36.7  C) Temp src: Axillary BP: 126/62 Pulse: 74 Heart Rate: 79 Resp: 8 SpO2: 100 % O2 Device: None (Room air)    Vitals:    08/30/19 1523 08/30/19 1811 09/03/19 1800   Weight: 75.5 kg (166 lb 7.2 oz) 75.2 kg (165 lb 12.6 oz) 78.3 kg (172 lb 9.9 oz)     Vital Signs with Ranges  Temp:  [97.3  F (36.3  C)-98.8  F (37.1  C)] 98  F (36.7  C)  Pulse:  [52-80] 74  Heart Rate:  [54-87] 79  Resp:  [8-25] 8  BP: (106-126)/(48-63) 126/62  SpO2:  [96 %-100 %] 100 %  I/O last 3 completed shifts:  In: 3573.69 [P.O.:950; I.V.:2623.69]  Out: 2925 [Urine:2755; Chest Tube:170]    Appearance: Alert and appropriate, well developed, nontoxic.  HEENT: Head: Atraumatic.   Eyes: PERRL, EOM grossly intact, conjunctivae and sclerae clear. Nose: Nares clear with no active discharge. Mouth/Throat: Moist mucous membranes. No oral lesions, pharynx clear with no erythema or exudate.    Respiratory: no increased work of breathing.   Cardiovascular: warm and well perfused  Abdominal:  Soft, non-distended.   Neurologic: Alert and oriented, right side of mouth is drooping. Right eye wider than left. Eyebrow lift symmetric bilaterally. Smile symmetric bilaterally.  Extremities/Back: No deformity.  Skin: No significant rashes, ecchymoses, or lacerations.     Medications     dextrose 5% and 0.9% NaCl 1,000 mL (09/04/19 0921)     - MEDICATION INSTRUCTIONS -       sodium chloride         acetaminophen  650 mg Oral Q6H     allopurinol  150 mg Oral TID     INTRATHECAL chemo - Cytarabine and/or methotrexate and/or Hydrocortisone   Intrathecal Once     DAUNOrubicin (CERUBIDINE) chemo infusion  25 mg/m2 (Treatment Plan  Recorded) Intravenous Q7 Days     dexamethasone  5 mg/m2 (Treatment Plan Recorded) Intravenous Q12H     famotidine  20 mg Intravenous Q12H     heparin lock flush  2-4 mL Intracatheter Q24H     heparin lock flush  2-4 mL Intracatheter Q24H     [START ON 9/8/2019] INTRATHECAL chemo - Cytarabine and/or methotrexate and/or Hydrocortisone   Intrathecal Once     [START ON 9/8/2019] ondansetron  8 mg Intravenous Q6H     polyethylene glycol  17 g Oral Daily     sodium chloride (PF)  3 mL Intracatheter Q8H     sulfamethoxazole-trimethoprim  1 tablet Oral Q Mon Tues BID     vinCRIStine (ONCOVIN) chemo infusion  2 mg Intravenous Q7 Days       Data   Results for orders placed or performed during the hospital encounter of 08/30/19 (from the past 24 hour(s))   Magnesium   Result Value Ref Range    Magnesium 2.5 (H) 1.6 - 2.3 mg/dL   Phosphorus   Result Value Ref Range    Phosphorus 3.6 2.5 - 4.5 mg/dL   Basic metabolic panel   Result Value Ref Range    Sodium 141 133 - 144 mmol/L    Potassium 3.9 3.4 - 5.3 mmol/L    Chloride 109 94 - 109 mmol/L    Carbon Dioxide 27 20 - 32 mmol/L    Anion Gap 5 3 - 14 mmol/L    Glucose 121 (H) 70 - 99 mg/dL    Urea Nitrogen 13 7 - 30 mg/dL    Creatinine 0.68 0.66 - 1.25 mg/dL    GFR Estimate >90 >60 mL/min/[1.73_m2]    GFR Estimate If Black >90 >60 mL/min/[1.73_m2]    Calcium 7.8 (L) 8.5 - 10.1 mg/dL   Calcium ionized whole blood   Result Value Ref Range    Calcium Ionized Whole Blood 4.6 4.4 - 5.2 mg/dL   Uric acid   Result Value Ref Range    Uric Acid 1.1 (L) 3.5 - 7.2 mg/dL   Magnesium   Result Value Ref Range    Magnesium 2.4 (H) 1.6 - 2.3 mg/dL   Phosphorus   Result Value Ref Range    Phosphorus 3.1 2.5 - 4.5 mg/dL   Basic metabolic panel   Result Value Ref Range    Sodium 141 133 - 144 mmol/L    Potassium 4.2 3.4 - 5.3 mmol/L    Chloride 111 (H) 94 - 109 mmol/L    Carbon Dioxide 23 20 - 32 mmol/L    Anion Gap 7 3 - 14 mmol/L    Glucose 150 (H) 70 - 99 mg/dL    Urea Nitrogen 18 7 - 30 mg/dL     Creatinine 0.75 0.66 - 1.25 mg/dL    GFR Estimate >90 >60 mL/min/[1.73_m2]    GFR Estimate If Black >90 >60 mL/min/[1.73_m2]    Calcium 7.2 (L) 8.5 - 10.1 mg/dL   Calcium ionized whole blood   Result Value Ref Range    Calcium Ionized Whole Blood 4.6 4.4 - 5.2 mg/dL   Uric acid   Result Value Ref Range    Uric Acid 0.7 (L) 3.5 - 7.2 mg/dL   Phosphorus   Result Value Ref Range    Phosphorus 3.8 2.5 - 4.5 mg/dL   Basic metabolic panel   Result Value Ref Range    Sodium 141 133 - 144 mmol/L    Potassium 4.2 3.4 - 5.3 mmol/L    Chloride 111 (H) 94 - 109 mmol/L    Carbon Dioxide 22 20 - 32 mmol/L    Anion Gap 8 3 - 14 mmol/L    Glucose 146 (H) 70 - 99 mg/dL    Urea Nitrogen 16 7 - 30 mg/dL    Creatinine 0.67 0.66 - 1.25 mg/dL    GFR Estimate >90 >60 mL/min/[1.73_m2]    GFR Estimate If Black >90 >60 mL/min/[1.73_m2]    Calcium 7.5 (L) 8.5 - 10.1 mg/dL   Albumin level   Result Value Ref Range    Albumin 2.3 (L) 3.4 - 5.0 g/dL   CBC with platelets differential   Result Value Ref Range    WBC 9.2 4.0 - 11.0 10e9/L    RBC Count 4.49 4.4 - 5.9 10e12/L    Hemoglobin 12.0 (L) 13.3 - 17.7 g/dL    Hematocrit 36.6 (L) 40.0 - 53.0 %    MCV 82 78 - 100 fl    MCH 26.7 26.5 - 33.0 pg    MCHC 32.8 31.5 - 36.5 g/dL    RDW 11.8 10.0 - 15.0 %    Platelet Count 304 150 - 450 10e9/L    Diff Method Automated Method     % Neutrophils 92.4 %    % Lymphocytes 3.6 %    % Monocytes 3.5 %    % Eosinophils 0.0 %    % Basophils 0.1 %    % Immature Granulocytes 0.4 %    Nucleated RBCs 0 0 /100    Absolute Neutrophil 8.5 (H) 1.6 - 8.3 10e9/L    Absolute Lymphocytes 0.3 (L) 0.8 - 5.3 10e9/L    Absolute Monocytes 0.3 0.0 - 1.3 10e9/L    Absolute Eosinophils 0.0 0.0 - 0.7 10e9/L    Absolute Basophils 0.0 0.0 - 0.2 10e9/L    Abs Immature Granulocytes 0.0 0 - 0.4 10e9/L    Absolute Nucleated RBC 0.0    INR   Result Value Ref Range    INR 1.20 (H) 0.86 - 1.14   Partial thromboplastin time   Result Value Ref Range    PTT 29 22 - 37 sec   Fibrinogen  activity   Result Value Ref Range    Fibrinogen 292 200 - 420 mg/dL   Calcium ionized whole blood   Result Value Ref Range    Calcium Ionized Whole Blood 4.8 4.4 - 5.2 mg/dL   Uric acid   Result Value Ref Range    Uric Acid 0.9 (L) 3.5 - 7.2 mg/dL   XR Chest Port 1 View    Narrative    XR CHEST PORT 1 VW  9/4/2019 6:40 AM      HISTORY: Monitor mediastinal mass, pleural effusion with chest tube in  place    COMPARISON: Previous day    FINDINGS:   2 portable upright views of the chest. Right arm PICC tip projects  near the brachiocephalic confluence. Left basilar chest tube is stable  in position. Slight increase in small amount of pleural fluid. Tiny  left apical pneumothorax is unchanged.    The mediastinal mass has slightly decreased in size from presentation  on 8/30. The cardiac silhouette size is stable. From yesterday, there  has been an increase in left basilar opacities.      Impression    IMPRESSION:   1. From yesterday, slight increase in small left pleural effusion and  adjacent left basilar opacities, likely representing atelectasis.  2. Stable tiny left apical pneumothorax.  3. From presentation on 8/30, suspect slight decrease in size of a  mediastinal mass.    FATOUMATA PINK MD   Echo Pediatric (TTE) Complete    Narrative    [id number]  [id number]  [id number]  [id number]^SOLOMON^SABINE^CONNIE                                                                   Study ID: [id number]                                                 [facility]'[address] [address]                                                Phone: [phone]                                Pediatric Echocardiogram  _____________________________________________________________________________  __     Name: PLACIDO RODRIGUEZ  Study Date: 09/04/2019 07:13 AM                     Patient Location: URU3  MRN: [id number]                                    Age: 19 yrs  : 2000                                    BP: 112/48 mmHg  Gender: Male  Patient Class: Inpatient                           Height: 183 cm  Ordering Provider: SABINE CARBAJAL                    Weight: 78 kg                                                     BSA: 2.0 m2  Performed By: Jones Peacock RDCS  Report approved by: Gumaro Arzola MD  Reason For Study: Neoplasm of heart or pericardium, Other, Please Specify in  Comme  _____________________________________________________________________________  __     ------CONCLUSIONS------  Normal intracardiac connections. The left and right ventricles have normal  chamber size, wall thickness, and systolic function. The calculated single  plane left ventricular ejection fraction from the 4 chamber view is 64%. There  is a large anterior mediastinal mass causing compression of the left atrium,  right and left pulmonary arteries, and SVC. The LPA has a peak gradient of 8  mmHg. There is mild compression and flow acceleration through the mid LA.  Proximal portion of the SVC is difficult to visulize.     Compared to the previous echo, the pericardial effusion is drecreased in size.  There is mild compression of the RPA, LPA, and SVC,  _____________________________________________________________________________  __        Technical information:  A complete two dimensional, MMODE, spectral and color Doppler transthoracic  echocardiogram is performed. Images are obtained from parasternal, apical,  subcostal and suprasternal notch views. The study quality is adequate.  Technically difficult study due to poor acoustic windows. ECG tracing shows  regular rhythm.     Segmental Anatomy:  There is normal atrial arrangement, with concordant atrioventricular and  ventriculoarterial connections.     Systemic and pulmonary veins:  The systemic venous return is normal. Normal coronary  sinus. The inferior vena  cava is of normal caliber. Proximal portion of the SVC is difficult to  visulize but the mean gradient is 1-2 mmHg . Color flow demonstrates flow from  three pulmonary veins entering the left atrium.     Atria and atrial septum:  Normal right atrial size. There is mild compression and flow acceleration  through the mid level of the LA. The mean gradient through the area of  compression is 1 mmHg. The atrial septum is not well visualized.        Atrioventricular valves:  The tricuspid valve is normal in appearance and motion. Trivial tricuspid  valve insufficiency. Estimated right ventricular systolic pressure is at least  45 mmHg plus right atrial pressure. The mitral valve is normal in appearance  and motion. There is no mitral valve insufficiency.     Ventricles and Ventricular Septum:  The left and right ventricles have normal chamber size, wall thickness, and  systolic function. The calculated single plane left ventricular ejection  fraction from the 4 chamber view is 58 %. There is no ventricular level  shunting.     Outflow tracts:  Normal great artery relationship. There is unobstructed flow through the right  ventricular outflow tract. The pulmonary valve motion is normal. There is  normal flow across the pulmonary valve. Trivial pulmonary valve insufficiency.  There is unobstructed flow through the left ventricular outflow tract.  Tricuspid aortic valve with normal appearance and motion. There is normal flow  across the aortic valve.     Great arteries:  The main pulmonary artery has normal appearance. There is unobstructed flow in  the main pulmonary artery. The pulmonary artery bifurcation is normal. There  is signficiant compression of both the RPA and the LPA from the tumor. The RPA  has continous flow with a mean gradient is 5 mmHg with a peak gradient of 14  mmHg. The LPA has a peak gradient of 8 mmHg. Normal ascending aorta. The  aortic arch appears normal. There is  unobstructed antegrade flow in the  ascending, transverse arch, descending thoracic and abdominal aorta. There is  still a variation in flow in the peak flow velocity in the abdominal aorta  with inspiration- but compared to previous echos it is getting better.     Arterial Shunts:  The ductal region is not imaged with this study.     Coronaries:  The coronary arteries are not evaluated.        Effusions, catheters, cannulas and leads:  There is a tiny circumferential pericardial effusion.     MMode/2D Measurements & Calculations  LA dimension: 2.9 cm                       Ao root diam: 3.4 cm  LA/Ao: 0.85                                4 Chamber EF: 64.0 %  LVMI(BSA): 127.5 grams/m2                  LVMI(Height): 49.8  RWT(MM): 0.48        Doppler Measurements & Calculations  PA V2 max: 74.1 cm/sec                 TR max marialuisa: 229.3 cm/sec  PA max P.2 mmHg                    TR max P.0 mmHg  LPA max marialuisa: 96.3 cm/sec  LPA max PG: 3.7 mmHg     desc Ao max marialuisa: 127.7 cm/sec  desc Ao max P.5 mmHg     [place] 2D Z-SCORE VALUES  Measurement NameValue Z-ScorePredictedNormal Range  LVLd apical(4ch)9.1 cm  LVLs apical(4ch)6.8 cm     [place] Z-Scores (Measurements & Calculations)  Measurement NameValue      Z-ScorePredictedNormal Range  IVSd(MM)        1.2 cm     1.0    1.0      0.71 - 1.33  IVSs(MM)        1.6 cm     1.1    1.4      1.0 - 1.8  LVIDd(MM)       5.2 cm     -0.04  5.2      4.5 - 6.0  LVIDs(MM)       2.7 cm     -1.8   3.4      2.6 - 4.1  LVPWd(MM)       1.2 cm     2.2    0.96     0.70 - 1.21  LVPWs(MM)       1.8 cm     1.3    1.6      1.2 - 1.9  LV mass(C)d(MM) 254.6 grams1.4    192.9    130.2 - 285.8  FS(MM)          48.3 %     3.2    34.9     28.5 - 42.6           Report approved by: Jocy Ewing 2019 09:18 AM      Magnesium   Result Value Ref Range    Magnesium 2.5 (H) 1.6 - 2.3 mg/dL   Phosphorus   Result Value Ref Range    Phosphorus 3.2 2.5 - 4.5 mg/dL   Basic metabolic panel   Result  Value Ref Range    Sodium 141 133 - 144 mmol/L    Potassium 3.9 3.4 - 5.3 mmol/L    Chloride 110 (H) 94 - 109 mmol/L    Carbon Dioxide 23 20 - 32 mmol/L    Anion Gap 8 3 - 14 mmol/L    Glucose 96 70 - 99 mg/dL    Urea Nitrogen 14 7 - 30 mg/dL    Creatinine 0.65 (L) 0.66 - 1.25 mg/dL    GFR Estimate >90 >60 mL/min/[1.73_m2]    GFR Estimate If Black >90 >60 mL/min/[1.73_m2]    Calcium 7.8 (L) 8.5 - 10.1 mg/dL   Calcium ionized whole blood   Result Value Ref Range    Calcium Ionized Whole Blood 4.8 4.4 - 5.2 mg/dL   Uric acid   Result Value Ref Range    Uric Acid 0.9 (L) 3.5 - 7.2 mg/dL   XR Chest Port 1 View    Narrative    HISTORY: Mediastinal mass. Chest tube removal.    COMPARISON: 9/4/2019    FINDINGS: Upright portable chest at 12:02 PM. Right PICC tip projects  over the upper SVC. Left pigtail drain has been removed. Trace  bilateral pleural fluid is present. New lucency is present below the  left hemidiaphragm, this is thought to represent stomach. Continued  widening of the superior mediastinum. Slightly decreased left basilar  opacification with continued mild perihilar opacities.      Impression    IMPRESSION: No pneumothorax post left chest tube removal. Trace  bilateral pleural effusions. Decreased left basilar atelectasis.  Unchanged mediastinal mass contours.    RYAN ANTONIO MD   Pediatric Critical Care Progress Note:    Lazaro Lund remains in the critical care unit recovering from severe respiratory distress secondary to anterior mediastinal mass which has been diagnosed as T cell lymphoma.  Mass is reducing in size as evidenced by clinical exam, ECHO, and chest tube output.    I personally examined and evaluated the patient today. All physician orders and treatments were placed at my direction.   I personally managed the antibiotic therapy, pain management, metabolic abnormalities, and nutritional status.   Key decisions made today included pull CT today given clear CXR and minimal output on water  seal.  Continue current chemotherapeutics per oncology.  Serial ECHOs and as needed for symptoms.    I spent a total of 40 minutes providing medical care services at the bedside, on the critical care unit, reviewing laboratory values and radiologic reports for Lazaro Lund.  Over 50% of my time on the unit was spent coordinating necessary care for the patient.      This patient is no longer critically ill, but requires cardiac/respiratory monitoring, vital sign monitoring, temperature maintenance, enteral feeding adjustments, lab and/or oxygen monitoring by the health care team under direct physician supervision.   The above plans and care have been discussed with self.  Saida Lutz MD, MD

## 2019-09-04 NOTE — PROGRESS NOTES
[facility]  PEDIATRIC HEMATOLOGY/ONCOLOGY   SOCIAL WORK PROGRESS NOTE      DATA:     Lazaro Lund is a 19 year old male with acute onset cough recently found to have anterior mediastinal mass and malignant left-sided pleural effusion concerning for lymphoma.    SW met with Lazaro. Mom was listening, though working in the back of the room and would answer questions periodically. Lazaro currently works full time at [employer] in [facility]). He pays privately for insurance. Lazaro lives with his dad and step mom in [place], [place]. Every other week, his step siblings (ages 3 and 4) live with them as well. He graduated from [employer] and started working immediately. He is weighing his options, but thinks he will continue working (vs going to school). Lazaro anticipates finances being a stressor if he is unable to work.      INTERVENTION:     Introduction of medical SW role. Discussed financial resources available. Will consult with medical team re: his ability to work through his treatment. Provided information re: supplemental security income and medical assistance. Provided monthly parking pass.     ASSESSMENT:     Lazaro was easily engaged and pleasant to meet with. He has no experience with social workers and was uncertain of the SW role. Lazaro asks good questions. Lazaro and mom may have confusion re: Lazaro's diagnosis and proposed treatment plan. Repetition will be helpful.     Lazaro identified that he is considering moving in with his mom in [place], [place] to avoid being around young children(step siblings) during treatment.     PLAN:     Social work will continue to assess needs and provide ongoing psychosocial support and access to resources.             CHEY Davila, [facility]  Pediatric Hem/Onc   Phone: [phone]  Pager: [phone]

## 2019-09-04 NOTE — PLAN OF CARE
PT: Cx- pt eating and then transferring units when PT session attempted. Will reschedule evaluation.

## 2019-09-04 NOTE — PROGRESS NOTES
Patient transferred up here around 13:30 from PICU.  Afebrile vital signs stable.  Patient denied of pain.  Chest tube site dressing leaked and dressing reinforced and so far it not much drainage noted.   Patient has some edema on PIV site and blue team aware.  Per patient reported the edema improved from yesterday.  OK to removed PIV.   Patient denies of pain or numbness.  Continue to monitor and notify MD of any changes or concerns.

## 2019-09-04 NOTE — PLAN OF CARE
VSS. Afebrile. C/O headache, originally rating a 7/10 pain, neuro intact. Patient went to sleep woke up and pain was 3/10. Oxycodone given. Patient reports relief. Patient voiding well, no stool on shift. White line cap change performed, no blood return noted and TPA order and dwelling. Will continue to monitor and notify MD with changes.

## 2019-09-04 NOTE — PROGRESS NOTES
[facility], [place]    Transfer Note - Heme/Onc Service        Date of Admission:  8/30/2019    Assessment & Plan   Lazaro Lund is a 19 year old male admitted on 8/30/2019 with mediastinal mass, left axillary and cervical adenopathy, and pleural effusion (sampled at OSH by flow and found to have neoplastic T cells) and subsequently identified pericardial effusion with evidence of tamponade physiology. 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 He was started on induction therapy per [id number] on 9/1 (note: Dexamethasone started on 8/31 pending logistical ability to perform bone marrow biopsy).    He was transferred to the floor in stable condition and requires ongoing admission for chemotherapy and close clinical and laboratory monitoring given ongoing tamponade physiology and risk of tumor lysis syndrome.    Had headache, nausea, photophobia, and dizziness without vision or blood pressure changes on arrival. Continues to have nonfocal neuro exam. Endorses history of migraines and current symptoms seem to be most consistent with migraine. Will add PRN oxycodone 5 mg q6h. No current indication for head imaging. At risk for cerebrovascular accident on PEG, will continue to monitor closely.          The patient's care was discussed with the Attending Physician, Dr. Lopez and fellow Dr. Fischer.    Leni Little MD, DPhil  Pediatric Resident, PGY-1  [facility]      ______________________________________________________________________    Interval History   Cough, SOB, and dysphagia have improved. Endorses HA 7/10, dizziness associated with nausea and photophobia. Has a history of migraine and thinks this feels like a migraine. Denies vision changes. Tolerating soft diet without emesis.    Data reviewed today: I reviewed all medications, new labs and imaging results over the last 24 hours. I personally reviewed no images or EKG's today.    Physical Exam   Vital  Signs: Temp: 98.8  F (37.1  C) Temp src: Axillary BP: 112/53 Pulse: 77 Heart Rate: 79 Resp: 8 SpO2: 99 % O2 Device: None (Room air)    Weight: 172 lbs 9.92 oz  Appearance: Alert and appropriate, well developed, nontoxic, playing WOW on laptop  HEENT: Normocephalic and atraumatic. PERRL, EOM grossly intact, conjunctivae clear. Nares clear with no active discharge.  No oral lesions, pharynx clear with no erythema or exudate.  Neck: Supple. No significant cervical lymphadenopathy.  Chest: Dressing at CT site clean and dry.  Pulmonary:  Good air entry, clear to auscultation bilaterally, with no rales, rhonchi, or wheezing.  Cardiovascular: Regular rate and rhythm, normal S1 and S2, with no murmurs.  Normal symmetric peripheral pulses and brisk cap refill.  Abdominal: Soft, nontender, nondistended.  Neurologic: Alert, cranial nerves II-XII  intact, strength 5/5 and symmetric bilaterally in upper and lower extremities. Proprioception intact in UE, normal rapid alternating movements and finger to nose, negative Romberg, sensation to fine touch intact and symmetric at distal lower extremity.  Extremities: No deformity, no peripheral edema  Skin: No significant rashes, ecchymoses, or lacerations on visible skin.      Data   Recent Labs   Lab 09/04/19  1040 09/04/19  0420 09/03/19  2332  09/03/19  0507  09/01/19  1740 09/01/19  1705  08/30/19  1557   WBC  --  9.2  --   --   --   --   --  9.5  --  5.2   HGB  --  12.0*  --   --   --   --   --  12.0*  --  13.4   MCV  --  82  --   --   --   --   --  82  --  82   PLT  --  304  --   --   --   --   --  398  --  430   INR  --  1.20*  --   --   --   --   --   --   --  1.17*    141 141   < > 142   < > 144 Canceled, Test credited   < > 140   POTASSIUM 3.9 4.2 4.2   < > 4.1   < > 3.8 Canceled, Test credited   < > 4.0   CHLORIDE 110* 111* 111*   < > 113*   < > 113* Canceled, Test credited   < > 105   CO2 23 22 23   < > 24   < > 23 Canceled, Test credited   < > 27   BUN 14 16 18   <  > 13   < > 11 Canceled, Test credited   < > 6*   CR 0.65* 0.67 0.75   < > 0.79   < > 0.81 Canceled, Test credited   < > 0.60*   ANIONGAP 8 8 7   < > 5   < > 8 Canceled, Test credited   < > 8   MICHAEL 7.8* 7.5* 7.2*   < > 7.6*   < > 8.0* Canceled, Test credited   < > 8.4*   GLC 96 146* 150*   < > 144*   < > 149* Canceled, Test credited  Canceled, Test credited   < > 95   ALBUMIN  --  2.3*  --   --  2.3*   < > 2.7* Canceled, Test credited   < > 3.1*   PROTTOTAL  --   --   --   --   --   --  6.2* Canceled, Test credited  --  6.4*   BILITOTAL  --   --   --   --   --   --  0.3 Canceled, Test credited  --  0.5   ALKPHOS  --   --   --   --   --   --  74 Canceled, Test credited  --  92   ALT  --   --   --   --   --   --  23 Canceled, Test credited  --  18   AST  --   --   --   --   --   --  8 Canceled, Test credited  --  21    < > = values in this interval not displayed.

## 2019-09-04 NOTE — PROGRESS NOTES
[facility], [place]    Pediatric Hematology / Oncology Progress Note    Date of Service (when I saw the patient): 09/04/2019     Assessment & Plan   Lazaro Lund is a 19 year old male admitted on 8/30/2019 with mediastinal mass, left axillary and cervical adenopathy, and pleural effusion (sampled at OSH by flow and found to have neoplastic T cells) and subsequently identified pericardial effusion with evidence of tamponade physiology. 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 Bone marrow confirmed today to be negative for malignant cells.  This places his as a Rubi stage 3.  He was started on induction therapy per [id number] on 9/1 (note: Dexamethasone started on 8/31 pending logistical ability to perform bone marrow biopsy). TLS monitoring has been stable, and his cardiac compression is improving and he is medically appropriate for transfer to the floor.    Recommendations  -Staging: OSH chest CT in PAX; LP; BMB; AUS. We will follow pending cytogenetics and molecular. Unable to lie flat for C/A/P CT scans at this time.  -Continue induction therapy (day 4): Decadron and PEG asparaginase today  -Chest tube removed today; monitor leaking around CT site  -daily ECHOs to re-assess cardiac compression ordered  -Continues to be at risk for TLS through PEG: monitoring labs and supporting with IVF and allopurinol.  -Swallow study today and advance diet as tolerated  -Other labs: daily albumin, CBC/Coags on Wednesday and then every other day CBC  -Continue famotidine and prophylactic bactrim     Signed,  Nicho Fischer   Pediatric Hematology/Oncology Fellow    Interval History   No acute overnight events. He tolerated a soft mechanical diet yesterday, and ECHO continues to demonstrate improvement of compression in the upright position. Chest tube was removed today, and this was tolerated well. No hypersensitivity symptoms with PEG administration. No fevers.    Physical Exam   Temp: 98.8   F (37.1  C) Temp src: Axillary BP: 112/53 Pulse: 77 Heart Rate: 79 Resp: 8 SpO2: 99 % O2 Device: None (Room air)    Vitals:    08/30/19 1523 08/30/19 1811 09/03/19 1800   Weight: 75.5 kg (166 lb 7.2 oz) 75.2 kg (165 lb 12.6 oz) 78.3 kg (172 lb 9.9 oz)     Vital Signs with Ranges  Temp:  [97.3  F (36.3  C)-98.8  F (37.1  C)] 98.8  F (37.1  C)  Pulse:  [52-80] 77  Heart Rate:  [54-87] 79  Resp:  [8-25] 8  BP: (106-126)/(48-63) 112/53  SpO2:  [96 %-100 %] 99 %  I/O last 3 completed shifts:  In: 3505.69 [P.O.:1220; I.V.:2168.69; IV Piggyback:117]  Out: 3405 [Urine:3105; Chest Tube:300]     Appearance: Alert and appropriate, well developed, nontoxic, with moist mucous membranes.  HEENT: Head: Normocephalic and atraumatic. Eyes: PERRL, EOM grossly intact, conjunctivae and sclerae clear. Nose: Nares clear with no active discharge.  Mouth/Throat: No oral lesions, pharynx clear with no erythema or exudate.  Neck: Supple, resolving left cervical lymphadenopathy, improved left axillary lymphadenopathy  Pulmonary: No grunting, flaring, retractions or stridor. Good air entry, clear to auscultation bilaterally.   Cardiovascular: Regular rate and rhythm, normal S1 and S2, with no murmurs.  Normal symmetric peripheral pulses and brisk cap refill.  Abdominal: Normal bowel sounds, soft, nontender, nondistended, with no masses and no hepatosplenomegaly.  Neurologic: Alert and oriented, cranial nerves II-XII grossly intact, moving all extremities equally with grossly normal coordination.  Skin: No significant rashes, ecchymoses, or lacerations.  Extremities: Right antecubital double lumen PICC line, no swelling or tenderness of arm.  Left antecubital peripheral IV, site not tender or swollen.    Medications     dextrose 5% and 0.9% NaCl 1,000 mL (09/04/19 0921)     - MEDICATION INSTRUCTIONS -       sodium chloride         acetaminophen  650 mg Oral Q6H     allopurinol  150 mg Oral TID     INTRATHECAL chemo - Cytarabine and/or  methotrexate and/or Hydrocortisone   Intrathecal Once     DAUNOrubicin (CERUBIDINE) chemo infusion  25 mg/m2 (Treatment Plan Recorded) Intravenous Q7 Days     dexamethasone  5 mg/m2 (Treatment Plan Recorded) Intravenous Q12H     famotidine  20 mg Intravenous Q12H     heparin lock flush  2-4 mL Intracatheter Q24H     heparin lock flush  2-4 mL Intracatheter Q24H     [START ON 9/8/2019] INTRATHECAL chemo - Cytarabine and/or methotrexate and/or Hydrocortisone   Intrathecal Once     [START ON 9/8/2019] ondansetron  8 mg Intravenous Q6H     polyethylene glycol  17 g Oral Daily     sodium chloride (PF)  3 mL Intracatheter Q8H     sulfamethoxazole-trimethoprim  1 tablet Oral Q Mon Tues BID     vinCRIStine (ONCOVIN) chemo infusion  2 mg Intravenous Q7 Days       Data   Results for orders placed or performed during the hospital encounter of 08/30/19 (from the past 24 hour(s))   Magnesium   Result Value Ref Range    Magnesium 2.5 (H) 1.6 - 2.3 mg/dL   Phosphorus   Result Value Ref Range    Phosphorus 3.6 2.5 - 4.5 mg/dL   Basic metabolic panel   Result Value Ref Range    Sodium 141 133 - 144 mmol/L    Potassium 3.9 3.4 - 5.3 mmol/L    Chloride 109 94 - 109 mmol/L    Carbon Dioxide 27 20 - 32 mmol/L    Anion Gap 5 3 - 14 mmol/L    Glucose 121 (H) 70 - 99 mg/dL    Urea Nitrogen 13 7 - 30 mg/dL    Creatinine 0.68 0.66 - 1.25 mg/dL    GFR Estimate >90 >60 mL/min/[1.73_m2]    GFR Estimate If Black >90 >60 mL/min/[1.73_m2]    Calcium 7.8 (L) 8.5 - 10.1 mg/dL   Calcium ionized whole blood   Result Value Ref Range    Calcium Ionized Whole Blood 4.6 4.4 - 5.2 mg/dL   Uric acid   Result Value Ref Range    Uric Acid 1.1 (L) 3.5 - 7.2 mg/dL   Magnesium   Result Value Ref Range    Magnesium 2.4 (H) 1.6 - 2.3 mg/dL   Phosphorus   Result Value Ref Range    Phosphorus 3.1 2.5 - 4.5 mg/dL   Basic metabolic panel   Result Value Ref Range    Sodium 141 133 - 144 mmol/L    Potassium 4.2 3.4 - 5.3 mmol/L    Chloride 111 (H) 94 - 109 mmol/L     Carbon Dioxide 23 20 - 32 mmol/L    Anion Gap 7 3 - 14 mmol/L    Glucose 150 (H) 70 - 99 mg/dL    Urea Nitrogen 18 7 - 30 mg/dL    Creatinine 0.75 0.66 - 1.25 mg/dL    GFR Estimate >90 >60 mL/min/[1.73_m2]    GFR Estimate If Black >90 >60 mL/min/[1.73_m2]    Calcium 7.2 (L) 8.5 - 10.1 mg/dL   Calcium ionized whole blood   Result Value Ref Range    Calcium Ionized Whole Blood 4.6 4.4 - 5.2 mg/dL   Uric acid   Result Value Ref Range    Uric Acid 0.7 (L) 3.5 - 7.2 mg/dL   Phosphorus   Result Value Ref Range    Phosphorus 3.8 2.5 - 4.5 mg/dL   Basic metabolic panel   Result Value Ref Range    Sodium 141 133 - 144 mmol/L    Potassium 4.2 3.4 - 5.3 mmol/L    Chloride 111 (H) 94 - 109 mmol/L    Carbon Dioxide 22 20 - 32 mmol/L    Anion Gap 8 3 - 14 mmol/L    Glucose 146 (H) 70 - 99 mg/dL    Urea Nitrogen 16 7 - 30 mg/dL    Creatinine 0.67 0.66 - 1.25 mg/dL    GFR Estimate >90 >60 mL/min/[1.73_m2]    GFR Estimate If Black >90 >60 mL/min/[1.73_m2]    Calcium 7.5 (L) 8.5 - 10.1 mg/dL   Albumin level   Result Value Ref Range    Albumin 2.3 (L) 3.4 - 5.0 g/dL   CBC with platelets differential   Result Value Ref Range    WBC 9.2 4.0 - 11.0 10e9/L    RBC Count 4.49 4.4 - 5.9 10e12/L    Hemoglobin 12.0 (L) 13.3 - 17.7 g/dL    Hematocrit 36.6 (L) 40.0 - 53.0 %    MCV 82 78 - 100 fl    MCH 26.7 26.5 - 33.0 pg    MCHC 32.8 31.5 - 36.5 g/dL    RDW 11.8 10.0 - 15.0 %    Platelet Count 304 150 - 450 10e9/L    Diff Method Automated Method     % Neutrophils 92.4 %    % Lymphocytes 3.6 %    % Monocytes 3.5 %    % Eosinophils 0.0 %    % Basophils 0.1 %    % Immature Granulocytes 0.4 %    Nucleated RBCs 0 0 /100    Absolute Neutrophil 8.5 (H) 1.6 - 8.3 10e9/L    Absolute Lymphocytes 0.3 (L) 0.8 - 5.3 10e9/L    Absolute Monocytes 0.3 0.0 - 1.3 10e9/L    Absolute Eosinophils 0.0 0.0 - 0.7 10e9/L    Absolute Basophils 0.0 0.0 - 0.2 10e9/L    Abs Immature Granulocytes 0.0 0 - 0.4 10e9/L    Absolute Nucleated RBC 0.0    INR   Result Value Ref  Range    INR 1.20 (H) 0.86 - 1.14   Partial thromboplastin time   Result Value Ref Range    PTT 29 22 - 37 sec   Fibrinogen activity   Result Value Ref Range    Fibrinogen 292 200 - 420 mg/dL   Calcium ionized whole blood   Result Value Ref Range    Calcium Ionized Whole Blood 4.8 4.4 - 5.2 mg/dL   Uric acid   Result Value Ref Range    Uric Acid 0.9 (L) 3.5 - 7.2 mg/dL   XR Chest Port 1 View    Narrative    XR CHEST PORT 1 VW  9/4/2019 6:40 AM      HISTORY: Monitor mediastinal mass, pleural effusion with chest tube in  place    COMPARISON: Previous day    FINDINGS:   2 portable upright views of the chest. Right arm PICC tip projects  near the brachiocephalic confluence. Left basilar chest tube is stable  in position. Slight increase in small amount of pleural fluid. Tiny  left apical pneumothorax is unchanged.    The mediastinal mass has slightly decreased in size from presentation  on 8/30. The cardiac silhouette size is stable. From yesterday, there  has been an increase in left basilar opacities.      Impression    IMPRESSION:   1. From yesterday, slight increase in small left pleural effusion and  adjacent left basilar opacities, likely representing atelectasis.  2. Stable tiny left apical pneumothorax.  3. From presentation on 8/30, suspect slight decrease in size of a  mediastinal mass.    FATOUMATA PINK MD   Echo Pediatric (TTE) Complete    Narrative    [id number]  [id number]  [id number]  [id number]^SOLOMON^SABINE^CONNIE                                                                   Study ID: [id number]                                                 [facility]'[address] [address]                                                Phone: [phone]                                Pediatric  Echocardiogram  _____________________________________________________________________________  __     Name: PLACIDO RODRIGUEZ  Study Date: 2019 07:13 AM                    Patient Location: [facility]  MRN: [id number]                                    Age: 19 yrs  : 2000                                    BP: 112/48 mmHg  Gender: Male  Patient Class: Inpatient                           Height: 183 cm  Ordering Provider: SABINE CARBAJAL                    Weight: 78 kg                                                     BSA: 2.0 m2  Performed By: Jones Peacock RDCS  Report approved by: Gumaro Arzola MD  Reason For Study: Neoplasm of heart or pericardium, Other, Please Specify in  Comme  _____________________________________________________________________________  __     ------CONCLUSIONS------  Normal intracardiac connections. The left and right ventricles have normal  chamber size, wall thickness, and systolic function. The calculated single  plane left ventricular ejection fraction from the 4 chamber view is 64%. There  is a large anterior mediastinal mass causing compression of the left atrium,  right and left pulmonary arteries, and SVC. The LPA has a peak gradient of 8  mmHg. There is mild compression and flow acceleration through the mid LA.  Proximal portion of the SVC is difficult to visulize.     Compared to the previous echo, the pericardial effusion is drecreased in size.  There is mild compression of the RPA, LPA, and SVC,  _____________________________________________________________________________  __        Technical information:  A complete two dimensional, MMODE, spectral and color Doppler transthoracic  echocardiogram is performed. Images are obtained from parasternal, apical,  subcostal and suprasternal notch views. The study quality is adequate.  Technically difficult study due to poor acoustic windows. ECG tracing shows  regular rhythm.     Segmental Anatomy:  There is normal atrial  arrangement, with concordant atrioventricular and  ventriculoarterial connections.     Systemic and pulmonary veins:  The systemic venous return is normal. Normal coronary sinus. The inferior vena  cava is of normal caliber. Proximal portion of the SVC is difficult to  visulize but the mean gradient is 1-2 mmHg . Color flow demonstrates flow from  three pulmonary veins entering the left atrium.     Atria and atrial septum:  Normal right atrial size. There is mild compression and flow acceleration  through the mid level of the LA. The mean gradient through the area of  compression is 1 mmHg. The atrial septum is not well visualized.        Atrioventricular valves:  The tricuspid valve is normal in appearance and motion. Trivial tricuspid  valve insufficiency. Estimated right ventricular systolic pressure is at least  45 mmHg plus right atrial pressure. The mitral valve is normal in appearance  and motion. There is no mitral valve insufficiency.     Ventricles and Ventricular Septum:  The left and right ventricles have normal chamber size, wall thickness, and  systolic function. The calculated single plane left ventricular ejection  fraction from the 4 chamber view is 58 %. There is no ventricular level  shunting.     Outflow tracts:  Normal great artery relationship. There is unobstructed flow through the right  ventricular outflow tract. The pulmonary valve motion is normal. There is  normal flow across the pulmonary valve. Trivial pulmonary valve insufficiency.  There is unobstructed flow through the left ventricular outflow tract.  Tricuspid aortic valve with normal appearance and motion. There is normal flow  across the aortic valve.     Great arteries:  The main pulmonary artery has normal appearance. There is unobstructed flow in  the main pulmonary artery. The pulmonary artery bifurcation is normal. There  is signficiant compression of both the RPA and the LPA from the tumor. The RPA  has continous flow with a  mean gradient is 5 mmHg with a peak gradient of 14  mmHg. The LPA has a peak gradient of 8 mmHg. Normal ascending aorta. The  aortic arch appears normal. There is unobstructed antegrade flow in the  ascending, transverse arch, descending thoracic and abdominal aorta. There is  still a variation in flow in the peak flow velocity in the abdominal aorta  with inspiration- but compared to previous echos it is getting better.     Arterial Shunts:  The ductal region is not imaged with this study.     Coronaries:  The coronary arteries are not evaluated.        Effusions, catheters, cannulas and leads:  There is a tiny circumferential pericardial effusion.     MMode/2D Measurements & Calculations  LA dimension: 2.9 cm                       Ao root diam: 3.4 cm  LA/Ao: 0.85                                4 Chamber EF: 64.0 %  LVMI(BSA): 127.5 grams/m2                  LVMI(Height): 49.8  RWT(MM): 0.48        Doppler Measurements & Calculations  PA V2 max: 74.1 cm/sec                 TR max marialuisa: 229.3 cm/sec  PA max P.2 mmHg                    TR max P.0 mmHg  LPA max marialuisa: 96.3 cm/sec  LPA max PG: 3.7 mmHg     desc Ao max marialuisa: 127.7 cm/sec  desc Ao max P.5 mmHg     [place] 2D Z-SCORE VALUES  Measurement NameValue Z-ScorePredictedNormal Range  LVLd apical(4ch)9.1 cm  LVLs apical(4ch)6.8 cm     [place] Z-Scores (Measurements & Calculations)  Measurement NameValue      Z-ScorePredictedNormal Range  IVSd(MM)        1.2 cm     1.0    1.0      0.71 - 1.33  IVSs(MM)        1.6 cm     1.1    1.4      1.0 - 1.8  LVIDd(MM)       5.2 cm     -0.04  5.2      4.5 - 6.0  LVIDs(MM)       2.7 cm     -1.8   3.4      2.6 - 4.1  LVPWd(MM)       1.2 cm     2.2    0.96     0.70 - 1.21  LVPWs(MM)       1.8 cm     1.3    1.6      1.2 - 1.9  LV mass(C)d(MM) 254.6 grams1.4    192.9    130.2 - 285.8  FS(MM)          48.3 %     3.2    34.9     28.5 - 42.6           Report approved by: Jocy Ewign 2019 09:18 AM      Magnesium    Result Value Ref Range    Magnesium 2.5 (H) 1.6 - 2.3 mg/dL   Phosphorus   Result Value Ref Range    Phosphorus 3.2 2.5 - 4.5 mg/dL   Basic metabolic panel   Result Value Ref Range    Sodium 141 133 - 144 mmol/L    Potassium 3.9 3.4 - 5.3 mmol/L    Chloride 110 (H) 94 - 109 mmol/L    Carbon Dioxide 23 20 - 32 mmol/L    Anion Gap 8 3 - 14 mmol/L    Glucose 96 70 - 99 mg/dL    Urea Nitrogen 14 7 - 30 mg/dL    Creatinine 0.65 (L) 0.66 - 1.25 mg/dL    GFR Estimate >90 >60 mL/min/[1.73_m2]    GFR Estimate If Black >90 >60 mL/min/[1.73_m2]    Calcium 7.8 (L) 8.5 - 10.1 mg/dL   Calcium ionized whole blood   Result Value Ref Range    Calcium Ionized Whole Blood 4.8 4.4 - 5.2 mg/dL   Uric acid   Result Value Ref Range    Uric Acid 0.9 (L) 3.5 - 7.2 mg/dL   XR Chest Port 1 View    Narrative    HISTORY: Mediastinal mass. Chest tube removal.    COMPARISON: 9/4/2019    FINDINGS: Upright portable chest at 12:02 PM. Right PICC tip projects  over the upper SVC. Left pigtail drain has been removed. Trace  bilateral pleural fluid is present. New lucency is present below the  left hemidiaphragm, this is thought to represent stomach. Continued  widening of the superior mediastinum. Slightly decreased left basilar  opacification with continued mild perihilar opacities.      Impression    IMPRESSION: No pneumothorax post left chest tube removal. Trace  bilateral pleural effusions. Decreased left basilar atelectasis.  Unchanged mediastinal mass contours.    RYAN ANTONIO MD     I personally saw and examined the patient with the fellow, Dr. Fischer as above.  I personally reviewed the laboratory and imaging results as above. I agree with the assessment and plan as above.  Heather Lopez MSc, MD

## 2019-09-05 ENCOUNTER — APPOINTMENT (OUTPATIENT)
Dept: SPEECH THERAPY | Facility: CLINIC | Age: 19
DRG: 847 | End: 2019-09-05
Attending: STUDENT IN AN ORGANIZED HEALTH CARE EDUCATION/TRAINING PROGRAM
Payer: COMMERCIAL

## 2019-09-05 ENCOUNTER — APPOINTMENT (OUTPATIENT)
Dept: PHYSICAL THERAPY | Facility: CLINIC | Age: 19
DRG: 847 | End: 2019-09-05
Attending: STUDENT IN AN ORGANIZED HEALTH CARE EDUCATION/TRAINING PROGRAM
Payer: COMMERCIAL

## 2019-09-05 ENCOUNTER — APPOINTMENT (OUTPATIENT)
Dept: CARDIOLOGY | Facility: CLINIC | Age: 19
DRG: 847 | End: 2019-09-05
Attending: STUDENT IN AN ORGANIZED HEALTH CARE EDUCATION/TRAINING PROGRAM
Payer: COMMERCIAL

## 2019-09-05 ENCOUNTER — APPOINTMENT (OUTPATIENT)
Dept: GENERAL RADIOLOGY | Facility: CLINIC | Age: 19
DRG: 847 | End: 2019-09-05
Attending: STUDENT IN AN ORGANIZED HEALTH CARE EDUCATION/TRAINING PROGRAM
Payer: COMMERCIAL

## 2019-09-05 PROBLEM — C83.50 T LYMPHOBLASTIC LYMPHOMA (H): Status: ACTIVE | Noted: 2019-09-05

## 2019-09-05 LAB
ALBUMIN SERPL-MCNC: 2.3 G/DL (ref 3.4–5)
ANION GAP SERPL CALCULATED.3IONS-SCNC: 10 MMOL/L (ref 3–14)
ANION GAP SERPL CALCULATED.3IONS-SCNC: 8 MMOL/L (ref 3–14)
ANION GAP SERPL CALCULATED.3IONS-SCNC: 9 MMOL/L (ref 3–14)
BUN SERPL-MCNC: 21 MG/DL (ref 7–30)
BUN SERPL-MCNC: 21 MG/DL (ref 7–30)
BUN SERPL-MCNC: 23 MG/DL (ref 7–30)
CA-I BLD-MCNC: 4.3 MG/DL (ref 4.4–5.2)
CA-I BLD-MCNC: 4.8 MG/DL (ref 4.4–5.2)
CA-I BLD-MCNC: 4.8 MG/DL (ref 4.4–5.2)
CALCIUM SERPL-MCNC: 7.6 MG/DL (ref 8.5–10.1)
CALCIUM SERPL-MCNC: 7.7 MG/DL (ref 8.5–10.1)
CALCIUM SERPL-MCNC: 8 MG/DL (ref 8.5–10.1)
CHLORIDE SERPL-SCNC: 107 MMOL/L (ref 94–109)
CHLORIDE SERPL-SCNC: 108 MMOL/L (ref 94–109)
CHLORIDE SERPL-SCNC: 110 MMOL/L (ref 94–109)
CO2 SERPL-SCNC: 19 MMOL/L (ref 20–32)
CO2 SERPL-SCNC: 25 MMOL/L (ref 20–32)
CO2 SERPL-SCNC: 25 MMOL/L (ref 20–32)
COPATH REPORT: NORMAL
CREAT SERPL-MCNC: 0.59 MG/DL (ref 0.66–1.25)
CREAT SERPL-MCNC: 0.6 MG/DL (ref 0.66–1.25)
CREAT SERPL-MCNC: 0.61 MG/DL (ref 0.66–1.25)
GFR SERPL CREATININE-BSD FRML MDRD: >90 ML/MIN/{1.73_M2}
GLUCOSE SERPL-MCNC: 145 MG/DL (ref 70–99)
GLUCOSE SERPL-MCNC: 89 MG/DL (ref 70–99)
GLUCOSE SERPL-MCNC: 92 MG/DL (ref 70–99)
MAGNESIUM SERPL-MCNC: 2.3 MG/DL (ref 1.6–2.3)
PHOSPHATE SERPL-MCNC: 3.4 MG/DL (ref 2.5–4.5)
PHOSPHATE SERPL-MCNC: 3.9 MG/DL (ref 2.5–4.5)
PHOSPHATE SERPL-MCNC: 3.9 MG/DL (ref 2.5–4.5)
POTASSIUM SERPL-SCNC: 3.8 MMOL/L (ref 3.4–5.3)
POTASSIUM SERPL-SCNC: 3.9 MMOL/L (ref 3.4–5.3)
POTASSIUM SERPL-SCNC: 4.2 MMOL/L (ref 3.4–5.3)
SODIUM SERPL-SCNC: 139 MMOL/L (ref 133–144)
SODIUM SERPL-SCNC: 141 MMOL/L (ref 133–144)
SODIUM SERPL-SCNC: 141 MMOL/L (ref 133–144)
URATE SERPL-MCNC: 1.1 MG/DL (ref 3.5–7.2)
URATE SERPL-MCNC: 1.6 MG/DL (ref 3.5–7.2)
URATE SERPL-MCNC: 1.6 MG/DL (ref 3.5–7.2)

## 2019-09-05 PROCEDURE — 83735 ASSAY OF MAGNESIUM: CPT | Performed by: STUDENT IN AN ORGANIZED HEALTH CARE EDUCATION/TRAINING PROGRAM

## 2019-09-05 PROCEDURE — 25000128 H RX IP 250 OP 636: Performed by: STUDENT IN AN ORGANIZED HEALTH CARE EDUCATION/TRAINING PROGRAM

## 2019-09-05 PROCEDURE — 25800030 ZZH RX IP 258 OP 636

## 2019-09-05 PROCEDURE — 84100 ASSAY OF PHOSPHORUS: CPT | Performed by: STUDENT IN AN ORGANIZED HEALTH CARE EDUCATION/TRAINING PROGRAM

## 2019-09-05 PROCEDURE — 92610 EVALUATE SWALLOWING FUNCTION: CPT | Mod: GN

## 2019-09-05 PROCEDURE — 80069 RENAL FUNCTION PANEL: CPT | Performed by: STUDENT IN AN ORGANIZED HEALTH CARE EDUCATION/TRAINING PROGRAM

## 2019-09-05 PROCEDURE — 25000132 ZZH RX MED GY IP 250 OP 250 PS 637: Performed by: STUDENT IN AN ORGANIZED HEALTH CARE EDUCATION/TRAINING PROGRAM

## 2019-09-05 PROCEDURE — 97162 PT EVAL MOD COMPLEX 30 MIN: CPT | Mod: GP

## 2019-09-05 PROCEDURE — 12000014 ZZH R&B PEDS UMMC

## 2019-09-05 PROCEDURE — 71045 X-RAY EXAM CHEST 1 VIEW: CPT

## 2019-09-05 PROCEDURE — 97110 THERAPEUTIC EXERCISES: CPT | Mod: GP

## 2019-09-05 PROCEDURE — 82330 ASSAY OF CALCIUM: CPT | Performed by: STUDENT IN AN ORGANIZED HEALTH CARE EDUCATION/TRAINING PROGRAM

## 2019-09-05 PROCEDURE — 25000132 ZZH RX MED GY IP 250 OP 250 PS 637

## 2019-09-05 PROCEDURE — 25000128 H RX IP 250 OP 636: Performed by: PEDIATRICS

## 2019-09-05 PROCEDURE — 93306 TTE W/DOPPLER COMPLETE: CPT

## 2019-09-05 PROCEDURE — 80048 BASIC METABOLIC PNL TOTAL CA: CPT | Performed by: STUDENT IN AN ORGANIZED HEALTH CARE EDUCATION/TRAINING PROGRAM

## 2019-09-05 PROCEDURE — 36415 COLL VENOUS BLD VENIPUNCTURE: CPT | Performed by: STUDENT IN AN ORGANIZED HEALTH CARE EDUCATION/TRAINING PROGRAM

## 2019-09-05 PROCEDURE — 92526 ORAL FUNCTION THERAPY: CPT | Mod: GN

## 2019-09-05 PROCEDURE — 84550 ASSAY OF BLOOD/URIC ACID: CPT | Performed by: STUDENT IN AN ORGANIZED HEALTH CARE EDUCATION/TRAINING PROGRAM

## 2019-09-05 PROCEDURE — 36592 COLLECT BLOOD FROM PICC: CPT | Performed by: STUDENT IN AN ORGANIZED HEALTH CARE EDUCATION/TRAINING PROGRAM

## 2019-09-05 PROCEDURE — 25000128 H RX IP 250 OP 636: Performed by: RADIOLOGY

## 2019-09-05 RX ADMIN — ACETAMINOPHEN 650 MG: 325 TABLET, FILM COATED ORAL at 01:54

## 2019-09-05 RX ADMIN — DIPHENHYDRAMINE HYDROCHLORIDE 25 MG: 50 INJECTION, SOLUTION INTRAMUSCULAR; INTRAVENOUS at 17:56

## 2019-09-05 RX ADMIN — ACETAMINOPHEN 650 MG: 325 TABLET, FILM COATED ORAL at 08:55

## 2019-09-05 RX ADMIN — Medication 3 MG: at 00:33

## 2019-09-05 RX ADMIN — ACETAMINOPHEN 650 MG: 325 TABLET, FILM COATED ORAL at 19:53

## 2019-09-05 RX ADMIN — Medication 150 MG: at 19:53

## 2019-09-05 RX ADMIN — DEXTROSE AND SODIUM CHLORIDE 1000 ML: 5; 900 INJECTION, SOLUTION INTRAVENOUS at 04:18

## 2019-09-05 RX ADMIN — DEXAMETHASONE SODIUM PHOSPHATE 9.76 MG: 4 INJECTION, SOLUTION INTRAMUSCULAR; INTRAVENOUS at 08:56

## 2019-09-05 RX ADMIN — SENNOSIDES 8.6 MG: 8.6 TABLET, FILM COATED ORAL at 09:05

## 2019-09-05 RX ADMIN — POLYETHYLENE GLYCOL 3350 17 G: 17 POWDER, FOR SOLUTION ORAL at 09:01

## 2019-09-05 RX ADMIN — SENNOSIDES 8.6 MG: 8.6 TABLET, FILM COATED ORAL at 19:53

## 2019-09-05 RX ADMIN — OXYCODONE HYDROCHLORIDE 5 MG: 5 TABLET ORAL at 17:51

## 2019-09-05 RX ADMIN — ACETAMINOPHEN 650 MG: 325 TABLET, FILM COATED ORAL at 13:40

## 2019-09-05 RX ADMIN — Medication 150 MG: at 08:56

## 2019-09-05 RX ADMIN — SODIUM CHLORIDE, PRESERVATIVE FREE 2 ML: 5 INJECTION INTRAVENOUS at 09:08

## 2019-09-05 RX ADMIN — FAMOTIDINE 20 MG: 20 INJECTION, SOLUTION INTRAVENOUS at 20:35

## 2019-09-05 RX ADMIN — FAMOTIDINE 20 MG: 20 INJECTION, SOLUTION INTRAVENOUS at 09:17

## 2019-09-05 ASSESSMENT — MIFFLIN-ST. JEOR: SCORE: 1817.62

## 2019-09-05 NOTE — PLAN OF CARE
Discharge Planner SLP   Patient plan for discharge: TBD  Current status: Lazaro presents with mild oral-pharyngeal dysphagia secondary to mediastinal mass, left axillary and cervical adenopathy, and pleural effusion. Lazaro presented with dysphagia upon admission characterized by coughing on all consistencies and feeling of globus sensation when eating. Medical team placed on mechanical/soft diet with reported 'NO SOLIDS until gradients on Echo improve'. Discussed with team that speech can advance diet as tolerated. Lazaro consumed thin liquids, purees, and solid textures with no overt s/sx of aspiration, globus sensation, or pain when swallowing. Lazaro denied his initial dysphagia symptoms for about three days.  Provided extensive education to patient regarding evaluation, aspiration, and swallowing strategies to ease back in to regular diet.     Recommend regular diet with thin liquids. Swallow precautions: moisten foods for the first couple of meals, alternate consistencies, small bites/sip, and sitting upright in the chair if able. SLP team to follow for 1-2x as medical treatment is provided to ensure tolerance of diet and management of dysphagia as tumor is being treated.     Barriers to return to prior living situation: Medical status  Recommendations for discharge: Will likely meet swallowing goals inpatient, OP swallow tx follow-up if necessary  Rationale for recommendations: Mild oral-pharyngeal dysphagia        Entered by: Verito Lau 09/05/2019 10:47 AM

## 2019-09-05 NOTE — PLAN OF CARE
Pt alert and cooperative with cares. VSS/afebrile. Speech changed diet to regular diet. Pt has had no difficulty swallowing with meds/PO intake. Has good Fluid intake and Good U/O. No BM since 09/01, admin. Sched. Miralax and prn Senna given. Abdomen is soft and nontender, BS active in all four quadrants. Dressing over old chest tube site is clean, dry and intact. CXr and Echo completed today at bedside. Will continue to monitor pt status and update team with changes.

## 2019-09-05 NOTE — PLAN OF CARE
PT Unit 5: Lazaro was seen by PT for initial evaluation and treatment was initiated. Provided pt with strengthening HEP for progression of gross strength. Educated on admission and ways to stay strong during hospitalizations. Provided pt with ergometer for LE strengthening. Will follow pt 2x/week while IP to progress HEP and review exercises as tolerated.    Tessy Oliver, PT, [phone]

## 2019-09-05 NOTE — PROGRESS NOTES
"Bedside Swallow Evaluation     09/05/19 1000   General Information   Onset Date 08/30/19   Start of Care Date 09/05/19   Referring Physician Ellen Garcia MD   Patient Profile Review/OT: Additional Occupational Profile Info See Profile for full history and prior level of function   Patient/Family Goals Statement Lazaro reports that he is looking forward to eating regular diet, soft foods are \"getting old\"   Swallowing Evaluation Bedside swallow evaluation   Behaviorial Observations WFL (within functional limits)   Mode of current nutrition Oral diet   Type of oral diet Dysphagia diet level 2;Thin liquid   Respiratory Status Room air   Comments Per MD note: Lazaro Lund is a 19 year old male admitted on 8/30/2019 with mediastinal mass, left axillary and cervical adenopathy, and pleural effusion (sampled at OSH by flow and found to have neoplastic T cells) and subsequently identified pericardial effusion with evidence of tamponade physiology. 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 Bone marrow confirmed today to be negative for malignant cells.  This places his as a Rubi stage 3.  He was started on induction therapy per [id number] on 9/1 (note: Dexamethasone started on 8/31 pending logistical ability to perform bone marrow biopsy). TLS monitoring has been stable, and his cardiac compression is improving and he is medically appropriate for transfer to the floor.     Swallowing: Due to location of tumor and dysphagia symptoms, medical team placed on mechanical soft diet. Lazaro reported that when he came in, he was coughing with solid foods and liquids. Foods were painful to swallow and were getting stuck somewhere in his throat. Per medical team upon initiation of diet \"Consider swallow study after tumor has decreased in size\". Medical team reported to therapist that he is OK to trial regular diet textures.    Clinical Swallow Evaluation   Oral Musculature generally intact   Structural Abnormalities " "none present   Dentition present and adequate   Mucosal Quality good   Mandibular Strength and Mobility intact   Oral Labial Strength and Mobility WFL   Lingual Strength and Mobility WFL   Velar Elevation intact   Buccal Strength and Mobility intact   Laryngeal Function Cough;Swallow;Voicing initiated;Dry swallow palpated   Additional Documentation Yes   Additional evaluation(s) completed today No   Clinical Swallow Eval: Thin Liquid Texture Trial   Mode of Presentation, Thin Liquids straw;self-fed   Volume of Liquid or Food Presented 3 oz   Oral Phase of Swallow WFL   Pharyngeal Phase of Swallow intact   Diagnostic Statement Single and sequential sips from the cup, no overt s/sx of aspiration   Clinical Swallow Eval: Puree Solid Texture Trial   Mode of Presentation, Puree spoon;self-fed   Volume of Puree Presented >3.5 oz   Oral Phase, Puree WFL   Pharyngeal Phase, Puree intact   Diagnostic Statement Quick rate of eating, no concerns, denies globus sensation or pain when swallowing   Clinical Swallow Eval: Solid Food Texture Trial   Mode of Presentation, Solid self-fed   Volume of Solid Food Presented 2 crackers   Oral Phase, Solid WFL   Pharyngeal Phase, Solid feeling of something stuck in throat   Diagnostic Statement Reported that cracker was dry and felt like it was slow to move down, but is \"normal\" for that consistency. Used liquid rinse to clear, denied pain or further globus sensation once swallow was initiated   Swallow Compensations   Swallow Compensations Pacing;Reduce amounts;Alternate viscosity of consistencies   Results Oral difficulties only   Esophageal Phase of Swallow   Patient reports or presents with symptoms of esophageal dysphagia No   General Therapy Interventions   Planned Therapy Interventions Dysphagia Treatment   Dysphagia treatment Instruction of safe swallow strategies   Swallow Eval: Clinical Impressions   Skilled Criteria for Therapy Intervention Skilled criteria met.  Treatment " "indicated.   Functional Assessment Scale (FAS) 6   Treatment Diagnosis Mild oral-pharyngeal dysphagia    Diet texture recommendations Regular diet;Thin liquids   Recommended Feeding/Eating Techniques small sips/bites;maintain upright posture during/after eating for 30 mins;alternate between small bites and sips of food/liquid   Therapy Frequency 3x/week   Predicted Duration of Therapy Intervention (days/wks) 1 week   Anticipated Discharge Disposition home w/ assist   Risks and Benefits of Treatment have been explained. Yes   Patient, family and/or staff in agreement with Plan of Care Yes   Clinical Impression Comments Lazaro presents with mild oral-pharyngeal dysphagia secondary to mediastinal mass, left axillary and cervical adenopathy, and pleural effusion. Lazaro presented with dysphagia characterized by coughing on all consistencies and feeling of globus sensation when eating. Medical team placed on mechanical/soft diet with reported \"NO SOLIDS until gradients on Echo improve\". Discussed with team that speech can advance diet as tolerated. Lazaro reported that soft diet is going well and for the past 3 days, no reports of coughing or food getting stuck. Lazaro tolerated single and sequential sips of thin liquid from the straw with no overt s/sx of aspiration. Lazaro tolerated puree textures with adequate oral and suspect adequate pharyngeal phase. Lazaro consumed 2 crackers reporting that they were dry and the bolus was cohesive, but denied pain, globus sensation, or difficulties. Provided extensive education to patient regarding evaluation, aspiration, and swallowing strategies to ease back in to regular diet.     Recommend regular diet with thin liquids. Swallow precautions: moisten foods for the first couple of meals, alternate consistencies, small bites/sip, and sitting upright in the chair if able. SLP team to follow for 1-2x as medical treatment is provided to ensure tolerance of diet and management of " dysphagia as tumor is being treated.    Total Evaluation Time   Total Evaluation Time (Minutes) 30       Thank you for this referral!  Verito Lau MA, CCC-SLP    Pager: [phone]

## 2019-09-05 NOTE — PLAN OF CARE
[id number]: AVSS. Denies pain. CT dressing site appears C/D/I. Eating and drinking ok. Good UOP. White PICC lumen TPA'd with good results. Up to shower this andreea. Pt's family at bedside. Hourly rounding completed.

## 2019-09-05 NOTE — PLAN OF CARE
Afebrile, VSS. No issues overnight. Good UOP. Mother at bedside overnight. No pain or nausea overnight.

## 2019-09-05 NOTE — PROGRESS NOTES
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 He was started on induction therapy per [id number] on 9/1 with dexamethasone substitution, now cycle 1 day 5. Currently stable without evidence of tumor lysis syndrome, hypotension, or tachycardia. Requires continued admission for close clinical monitoring and chemotherapy.    Today:  - spaced TLS labs to q12h  - decreased frequency of allopurinol to BID  - next ECHO 9/7 (no longer daily)  - fluids TKO    HEME/ONC  #T lymphoblastic lymphoma  -Dexamethasone 9.75 mg IV q12h (start 9/1, Day 1-14)  -Daunorubicin and Vincristine (9/1, Day 1 and 8)  -Methotrexate IT (9/8)  -s/p Pegasparginase 9/4  -CBC every other day, next 9/6  -Follow up pending cytogenetics and molecular  -Will likely need C/A/P CT scans when able to lie flat    #Chemotherapy induced nausea  -Zofran q6h  -Benadryl q6h PRN  -Ativan q6h PRN    #Risk of Tumor Lysis Syndrome  -BMP, mag, phos, uric acid q12h  -Decrease Allopurinol 150 mg to  BID   -s/p Rasburicase 6 mg 8/31    CV  #Pericardial effusion: improving  #Orthopnea: improving   #Tamponade Physiology  -Trend pressures on echo, next 9/7   -Cardiology consulted, appreciate recs  -HOB > 60 degrees at all times     #SVC Syndrome Risk   -Clinically monitor for face and neck swelling, chest pain, shortness of breath, headache, blurry vision     ID  -Bactrim ppx M/Tu     RESP  #Left pleural effusion, s/p chest tube placement (9/1)  -Stable on room air   -Chest tube to suction water seal (9/3) and pulled 9/4. CXR 1hr after chest tube pulled  non-concerning     GI  #Risk of gastritis  -IV famotidine 20 mg BID for gut protection while on steroids    #Bowel regimen  -Miralax 1 cap daily  -Senna BID     #Dysphagia (resolved)  -Regular diet     NEURO  -Melatonin 3 mg at bedtime PRN  -Scheduled Tylenol 650 mg PO q6h  -PRN oxycodone 5 mg q6h       Diet: Peds Diet Age 9-18 yrs    Fluids: D5/NS TKO  Lines: PICC  DVT Prophylaxis: Low Risk/Ambulatory with no VTE prophylaxis indicated  Lara Catheter: not present  Code Status: Full    Disposition Plan   Expected discharge: pending completion of therapy, resolution of tamponade physiology  Entered: Leni Little MD 09/05/2019, 12:28 PM       The patient's care was discussed with the Attending Physician, Dr. Hayes.    Leni Little MD, DPhil  Pediatric Resident, PGY-1  [facility]    Physician Attestation   I, Alexi Hayes MD, saw this patient with the resident and agree with the resident/fellow's findings and plan of care as documented in the note.      I personally reviewed vital signs, medications and labs.    Key findings:  I also examined the patient and agree with the assessment as noted.    Alexi Hayes MD  Date of Service (when I saw the patient): 9/5/19          ______________________________________________________________________    Interval History   NAEO. Had good pain relief for HA with oxycodone yesterday. No HA since. No PRNs for nausea. Tolerating diet, excited to move to solid foods today. No stool x3 days.    Data reviewed today: I reviewed all medications, new labs and imaging results over the last 24 hours. I personally reviewed no images or EKG's today.    Physical Exam   Vital Signs: Temp: 97.9  F (36.6  C) Temp src: Oral BP: 95/68 Pulse: 75 Heart Rate: 67 Resp: 18 SpO2: 99 % O2 Device: None (Room air)    Weight: 172 lbs 9.92 oz  Appearance: Alert and appropriate, well developed, nontoxic, no acute distress  HEENT: Normocephalic and atraumatic.   Conjunctivae clear. Nares clear with no active discharge.  MMM.  Neck: Supple.  Chest: Dressing at CT site clean and dry.  Pulmonary:  Good air entry, clear to auscultation bilaterally, with no rales, rhonchi, or wheezing.  Cardiovascular: Regular rate and rhythm, normal S1 and S2, with no murmurs.  Normal symmetric peripheral pulses and brisk cap refill.  Abdominal: Soft, nontender, nondistended.  Neurologic: Alert, responds appropriately to questions.  Extremities: No deformity, no peripheral edema  Skin: No significant rashes, ecchymoses, or lacerations on visible skin.    Data   Recent Labs   Lab 09/05/19  0629 09/05/19  0100 09/04/19  1925  09/04/19  0420  09/01/19  1740 09/01/19  1705  08/30/19  1557   WBC  --   --   --   --  9.2  --   --  9.5  --  5.2   HGB  --   --   --   --  12.0*  --   --  12.0*  --  13.4   MCV  --   --   --   --  82  --   --  82  --  82   PLT  --   --   --   --  304  --   --  398  --  430   INR  --   --   --   --  1.20*  --   --   --   --  1.17*    141 140   < > 141   < > 144 Canceled, Test credited   < > 140   POTASSIUM 4.2 3.9 4.1   < > 4.2   < > 3.8 Canceled, Test credited   < > 4.0   CHLORIDE 110* 107 107   < > 111*   < > 113* Canceled, Test credited   < > 105   CO2 19* 25 23   < > 22   < > 23 Canceled, Test credited   < > 27   BUN 23 21 19   < > 16   < > 11 Canceled, Test credited   < > 6*   CR 0.61* 0.59* 0.62*   < > 0.67   < > 0.81 Canceled, Test credited   < > 0.60*   ANIONGAP 10 9 10   < > 8   < > 8 Canceled, Test credited   < > 8   MICHAEL 7.6* 7.7* 7.5*   < > 7.5*   < > 8.0* Canceled, Test credited   < > 8.4*   GLC 89 145* 106*   < > 146*   < > 149* Canceled, Test credited  Canceled, Test credited   < > 95   ALBUMIN 2.3*  --   --   --  2.3*   < > 2.7* Canceled, Test credited   < > 3.1*   PROTTOTAL  --   --   --   --   --   --  6.2* Canceled, Test credited  --  6.4*   BILITOTAL  --   --   --   --   --   --  0.3 Canceled, Test credited  --  0.5   ALKPHOS  --   --   --   --    --   --  74 Canceled, Test credited  --  92   ALT  --   --   --   --   --   --  23 Canceled, Test credited  --  18   AST  --   --   --   --   --   --  8 Canceled, Test credited  --  21    < > = values in this interval not displayed.     Recent Results (from the past 24 hour(s))   XR Chest Port 1 View    Narrative    XR CHEST PORT 1 VW  9/5/2019 9:51 AM      HISTORY: Monitor mediastinal mass, pleural effusion with chest tube in  place    COMPARISON: Previous day    FINDINGS:   Portable upright view of the chest. Right arm PICC is stable in  position projecting over the high SVC. The cardiac silhouette size is  normal. Small bilateral pleural effusions are similar to the  comparison examination. There may be a tiny left apical pneumothorax.  Redemonstration of the mediastinal mass.      Impression    IMPRESSION:   Possible tiny left apical pneumothorax. Stable small pleural  effusions.    FATOUMATA PINK MD

## 2019-09-06 ENCOUNTER — APPOINTMENT (OUTPATIENT)
Dept: GENERAL RADIOLOGY | Facility: CLINIC | Age: 19
DRG: 847 | End: 2019-09-06
Attending: STUDENT IN AN ORGANIZED HEALTH CARE EDUCATION/TRAINING PROGRAM
Payer: COMMERCIAL

## 2019-09-06 LAB
ANION GAP SERPL CALCULATED.3IONS-SCNC: 6 MMOL/L (ref 3–14)
BASOPHILS # BLD AUTO: 0 10E9/L (ref 0–0.2)
BASOPHILS NFR BLD AUTO: 0.1 %
BUN SERPL-MCNC: 26 MG/DL (ref 7–30)
CALCIUM SERPL-MCNC: 7.4 MG/DL (ref 8.5–10.1)
CHLORIDE SERPL-SCNC: 109 MMOL/L (ref 94–109)
CO2 SERPL-SCNC: 25 MMOL/L (ref 20–32)
CREAT SERPL-MCNC: 0.63 MG/DL (ref 0.66–1.25)
DIFFERENTIAL METHOD BLD: ABNORMAL
EOSINOPHIL # BLD AUTO: 0 10E9/L (ref 0–0.7)
EOSINOPHIL NFR BLD AUTO: 0.1 %
ERYTHROCYTE [DISTWIDTH] IN BLOOD BY AUTOMATED COUNT: 11.8 % (ref 10–15)
GFR SERPL CREATININE-BSD FRML MDRD: >90 ML/MIN/{1.73_M2}
GLUCOSE SERPL-MCNC: 141 MG/DL (ref 70–99)
HCT VFR BLD AUTO: 39.1 % (ref 40–53)
HGB BLD-MCNC: 12.5 G/DL (ref 13.3–17.7)
IMM GRANULOCYTES # BLD: 0.1 10E9/L (ref 0–0.4)
IMM GRANULOCYTES NFR BLD: 0.7 %
LYMPHOCYTES # BLD AUTO: 1.2 10E9/L (ref 0.8–5.3)
LYMPHOCYTES NFR BLD AUTO: 14.2 %
MAGNESIUM SERPL-MCNC: 2.3 MG/DL (ref 1.6–2.3)
MCH RBC QN AUTO: 26.5 PG (ref 26.5–33)
MCHC RBC AUTO-ENTMCNC: 32 G/DL (ref 31.5–36.5)
MCV RBC AUTO: 83 FL (ref 78–100)
MONOCYTES # BLD AUTO: 0.2 10E9/L (ref 0–1.3)
MONOCYTES NFR BLD AUTO: 2.6 %
NEUTROPHILS # BLD AUTO: 6.7 10E9/L (ref 1.6–8.3)
NEUTROPHILS NFR BLD AUTO: 82.3 %
NRBC # BLD AUTO: 0 10*3/UL
NRBC BLD AUTO-RTO: 0 /100
PHOSPHATE SERPL-MCNC: 3.8 MG/DL (ref 2.5–4.5)
PLATELET # BLD AUTO: 269 10E9/L (ref 150–450)
POTASSIUM SERPL-SCNC: 3.8 MMOL/L (ref 3.4–5.3)
RBC # BLD AUTO: 4.71 10E12/L (ref 4.4–5.9)
SODIUM SERPL-SCNC: 140 MMOL/L (ref 133–144)
URATE SERPL-MCNC: 2.1 MG/DL (ref 3.5–7.2)
WBC # BLD AUTO: 8.2 10E9/L (ref 4–11)

## 2019-09-06 PROCEDURE — 25000128 H RX IP 250 OP 636: Performed by: STUDENT IN AN ORGANIZED HEALTH CARE EDUCATION/TRAINING PROGRAM

## 2019-09-06 PROCEDURE — 25000128 H RX IP 250 OP 636: Performed by: RADIOLOGY

## 2019-09-06 PROCEDURE — 85025 COMPLETE CBC W/AUTO DIFF WBC: CPT | Performed by: STUDENT IN AN ORGANIZED HEALTH CARE EDUCATION/TRAINING PROGRAM

## 2019-09-06 PROCEDURE — 84550 ASSAY OF BLOOD/URIC ACID: CPT | Performed by: STUDENT IN AN ORGANIZED HEALTH CARE EDUCATION/TRAINING PROGRAM

## 2019-09-06 PROCEDURE — 12000014 ZZH R&B PEDS UMMC

## 2019-09-06 PROCEDURE — 25000132 ZZH RX MED GY IP 250 OP 250 PS 637

## 2019-09-06 PROCEDURE — 83735 ASSAY OF MAGNESIUM: CPT | Performed by: STUDENT IN AN ORGANIZED HEALTH CARE EDUCATION/TRAINING PROGRAM

## 2019-09-06 PROCEDURE — 36415 COLL VENOUS BLD VENIPUNCTURE: CPT | Performed by: STUDENT IN AN ORGANIZED HEALTH CARE EDUCATION/TRAINING PROGRAM

## 2019-09-06 PROCEDURE — 84100 ASSAY OF PHOSPHORUS: CPT | Performed by: STUDENT IN AN ORGANIZED HEALTH CARE EDUCATION/TRAINING PROGRAM

## 2019-09-06 PROCEDURE — 25000132 ZZH RX MED GY IP 250 OP 250 PS 637: Performed by: STUDENT IN AN ORGANIZED HEALTH CARE EDUCATION/TRAINING PROGRAM

## 2019-09-06 PROCEDURE — 25800030 ZZH RX IP 258 OP 636: Performed by: STUDENT IN AN ORGANIZED HEALTH CARE EDUCATION/TRAINING PROGRAM

## 2019-09-06 PROCEDURE — 71045 X-RAY EXAM CHEST 1 VIEW: CPT

## 2019-09-06 PROCEDURE — 25000131 ZZH RX MED GY IP 250 OP 636 PS 637: Performed by: STUDENT IN AN ORGANIZED HEALTH CARE EDUCATION/TRAINING PROGRAM

## 2019-09-06 PROCEDURE — 80048 BASIC METABOLIC PNL TOTAL CA: CPT | Performed by: STUDENT IN AN ORGANIZED HEALTH CARE EDUCATION/TRAINING PROGRAM

## 2019-09-06 RX ORDER — EPINEPHRINE 1 MG/ML
0.3 INJECTION, SOLUTION, CONCENTRATE INTRAVENOUS EVERY 5 MIN PRN
Status: CANCELLED | OUTPATIENT
Start: 2019-09-24

## 2019-09-06 RX ORDER — ONDANSETRON 4 MG/1
8 TABLET, FILM COATED ORAL EVERY 6 HOURS
Status: CANCELLED
Start: 2019-09-22

## 2019-09-06 RX ORDER — ONDANSETRON 2 MG/ML
8 INJECTION INTRAMUSCULAR; INTRAVENOUS EVERY 6 HOURS
Status: CANCELLED
Start: 2019-09-24

## 2019-09-06 RX ORDER — EPINEPHRINE 1 MG/ML
0.3 INJECTION, SOLUTION, CONCENTRATE INTRAVENOUS EVERY 5 MIN PRN
Status: CANCELLED | OUTPATIENT
Start: 2019-10-01

## 2019-09-06 RX ORDER — EPINEPHRINE 1 MG/ML
0.3 INJECTION, SOLUTION, CONCENTRATE INTRAVENOUS EVERY 5 MIN PRN
Status: CANCELLED | OUTPATIENT
Start: 2019-09-17

## 2019-09-06 RX ORDER — DIPHENHYDRAMINE HYDROCHLORIDE 50 MG/ML
25-50 INJECTION INTRAMUSCULAR; INTRAVENOUS EVERY 6 HOURS PRN
Status: CANCELLED
Start: 2019-09-17

## 2019-09-06 RX ORDER — DIPHENHYDRAMINE HCL 25 MG
25-50 CAPSULE ORAL EVERY 6 HOURS PRN
Status: CANCELLED
Start: 2019-09-15

## 2019-09-06 RX ORDER — ALBUTEROL SULFATE 90 UG/1
1-2 AEROSOL, METERED RESPIRATORY (INHALATION)
Status: CANCELLED
Start: 2019-10-01

## 2019-09-06 RX ORDER — SODIUM CHLORIDE 9 MG/ML
200 INJECTION, SOLUTION INTRAVENOUS CONTINUOUS PRN
Status: CANCELLED | OUTPATIENT
Start: 2019-09-17

## 2019-09-06 RX ORDER — SODIUM CHLORIDE 9 MG/ML
200 INJECTION, SOLUTION INTRAVENOUS CONTINUOUS PRN
Status: CANCELLED | OUTPATIENT
Start: 2019-09-24

## 2019-09-06 RX ORDER — ALBUTEROL SULFATE 0.83 MG/ML
2.5 SOLUTION RESPIRATORY (INHALATION)
Status: CANCELLED | OUTPATIENT
Start: 2019-09-24

## 2019-09-06 RX ORDER — LORAZEPAM 0.5 MG/1
.5-2 TABLET ORAL EVERY 6 HOURS PRN
Status: CANCELLED
Start: 2019-09-15

## 2019-09-06 RX ORDER — METHYLPREDNISOLONE SODIUM SUCCINATE 125 MG/2ML
125 INJECTION, POWDER, LYOPHILIZED, FOR SOLUTION INTRAMUSCULAR; INTRAVENOUS
Status: CANCELLED | OUTPATIENT
Start: 2019-09-24

## 2019-09-06 RX ORDER — ALBUTEROL SULFATE 0.83 MG/ML
2.5 SOLUTION RESPIRATORY (INHALATION)
Status: CANCELLED | OUTPATIENT
Start: 2019-10-01

## 2019-09-06 RX ORDER — ONDANSETRON HYDROCHLORIDE 4 MG/5ML
8 SOLUTION ORAL EVERY 6 HOURS
Status: CANCELLED
Start: 2019-09-22

## 2019-09-06 RX ORDER — ONDANSETRON 4 MG/1
8 TABLET, FILM COATED ORAL EVERY 6 HOURS
Status: CANCELLED
Start: 2019-09-15

## 2019-09-06 RX ORDER — ALBUTEROL SULFATE 0.83 MG/ML
2.5 SOLUTION RESPIRATORY (INHALATION)
Status: CANCELLED | OUTPATIENT
Start: 2019-09-17

## 2019-09-06 RX ORDER — LORAZEPAM 2 MG/ML
.5-2 INJECTION INTRAMUSCULAR EVERY 6 HOURS PRN
Status: CANCELLED
Start: 2019-09-22

## 2019-09-06 RX ORDER — METHYLPREDNISOLONE SODIUM SUCCINATE 125 MG/2ML
125 INJECTION, POWDER, LYOPHILIZED, FOR SOLUTION INTRAMUSCULAR; INTRAVENOUS
Status: CANCELLED | OUTPATIENT
Start: 2019-09-17

## 2019-09-06 RX ORDER — LORAZEPAM 2 MG/ML
.5-2 INJECTION INTRAMUSCULAR EVERY 6 HOURS PRN
Status: CANCELLED
Start: 2019-09-15

## 2019-09-06 RX ORDER — DIPHENHYDRAMINE HYDROCHLORIDE 50 MG/ML
50 INJECTION INTRAMUSCULAR; INTRAVENOUS
Status: CANCELLED
Start: 2019-09-17

## 2019-09-06 RX ORDER — ALBUTEROL SULFATE 90 UG/1
1-2 AEROSOL, METERED RESPIRATORY (INHALATION)
Status: CANCELLED
Start: 2019-09-17

## 2019-09-06 RX ORDER — ONDANSETRON HYDROCHLORIDE 4 MG/5ML
8 SOLUTION ORAL EVERY 6 HOURS
Status: CANCELLED
Start: 2019-09-15

## 2019-09-06 RX ORDER — DEXAMETHASONE 2 MG/1
6 TABLET ORAL EVERY 12 HOURS SCHEDULED
Status: COMPLETED | OUTPATIENT
Start: 2019-09-06 | End: 2019-09-07

## 2019-09-06 RX ORDER — METHYLPREDNISOLONE SODIUM SUCCINATE 125 MG/2ML
125 INJECTION, POWDER, LYOPHILIZED, FOR SOLUTION INTRAMUSCULAR; INTRAVENOUS
Status: CANCELLED | OUTPATIENT
Start: 2019-10-01

## 2019-09-06 RX ORDER — LORAZEPAM 0.5 MG/1
.5-2 TABLET ORAL EVERY 6 HOURS PRN
Status: CANCELLED
Start: 2019-09-22

## 2019-09-06 RX ORDER — DIPHENHYDRAMINE HYDROCHLORIDE 50 MG/ML
50 INJECTION INTRAMUSCULAR; INTRAVENOUS
Status: CANCELLED
Start: 2019-09-24

## 2019-09-06 RX ORDER — DIPHENHYDRAMINE HCL 25 MG
25-50 CAPSULE ORAL EVERY 6 HOURS PRN
Status: CANCELLED
Start: 2019-09-22

## 2019-09-06 RX ORDER — ONDANSETRON 2 MG/ML
8 INJECTION INTRAMUSCULAR; INTRAVENOUS EVERY 6 HOURS
Status: CANCELLED
Start: 2019-09-17

## 2019-09-06 RX ORDER — SODIUM CHLORIDE 9 MG/ML
200 INJECTION, SOLUTION INTRAVENOUS CONTINUOUS PRN
Status: CANCELLED | OUTPATIENT
Start: 2019-10-01

## 2019-09-06 RX ORDER — ALBUTEROL SULFATE 90 UG/1
1-2 AEROSOL, METERED RESPIRATORY (INHALATION)
Status: CANCELLED
Start: 2019-09-24

## 2019-09-06 RX ORDER — DIPHENHYDRAMINE HYDROCHLORIDE 50 MG/ML
50 INJECTION INTRAMUSCULAR; INTRAVENOUS
Status: CANCELLED
Start: 2019-10-01

## 2019-09-06 RX ORDER — DIPHENHYDRAMINE HYDROCHLORIDE 50 MG/ML
25-50 INJECTION INTRAMUSCULAR; INTRAVENOUS EVERY 6 HOURS PRN
Status: CANCELLED
Start: 2019-09-24

## 2019-09-06 RX ORDER — DEXAMETHASONE 2 MG/1
6 TABLET ORAL EVERY 12 HOURS SCHEDULED
Status: DISCONTINUED | OUTPATIENT
Start: 2019-09-06 | End: 2019-09-06

## 2019-09-06 RX ADMIN — FAMOTIDINE 20 MG: 20 INJECTION, SOLUTION INTRAVENOUS at 20:36

## 2019-09-06 RX ADMIN — ACETAMINOPHEN 650 MG: 325 TABLET, FILM COATED ORAL at 14:36

## 2019-09-06 RX ADMIN — SODIUM CHLORIDE, PRESERVATIVE FREE 2 ML: 5 INJECTION INTRAVENOUS at 11:25

## 2019-09-06 RX ADMIN — Medication 150 MG: at 20:36

## 2019-09-06 RX ADMIN — Medication 3 MG: at 21:59

## 2019-09-06 RX ADMIN — DEXAMETHASONE 6 MG: 2 TABLET ORAL at 20:36

## 2019-09-06 RX ADMIN — DEXTROSE AND SODIUM CHLORIDE 1000 ML: 5; 900 INJECTION, SOLUTION INTRAVENOUS at 02:19

## 2019-09-06 RX ADMIN — ACETAMINOPHEN 650 MG: 325 TABLET, FILM COATED ORAL at 08:20

## 2019-09-06 RX ADMIN — Medication 150 MG: at 08:21

## 2019-09-06 RX ADMIN — DEXAMETHASONE 6 MG: 2 TABLET ORAL at 11:25

## 2019-09-06 RX ADMIN — DEXTROSE AND SODIUM CHLORIDE 1000 ML: 5; 900 INJECTION, SOLUTION INTRAVENOUS at 11:25

## 2019-09-06 RX ADMIN — ACETAMINOPHEN 650 MG: 325 TABLET, FILM COATED ORAL at 19:07

## 2019-09-06 RX ADMIN — POLYETHYLENE GLYCOL 3350 17 G: 17 POWDER, FOR SOLUTION ORAL at 08:21

## 2019-09-06 RX ADMIN — FAMOTIDINE 20 MG: 20 INJECTION, SOLUTION INTRAVENOUS at 08:13

## 2019-09-06 RX ADMIN — ACETAMINOPHEN 650 MG: 325 TABLET, FILM COATED ORAL at 02:19

## 2019-09-06 ASSESSMENT — MIFFLIN-ST. JEOR: SCORE: 1808.62

## 2019-09-06 NOTE — PROGRESS NOTES
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    SW met with Lazaro and his mom, Candelaria today. Lazaro requested assistance with his [employer] application.     INTERVENTION:     SW assisted Lazaro in creating an account and applying for [employer]. Some difficulty with [employer] website, likely due to poor wifi connection. Lazaro educated on how to complete application and will plan to complete when he returns home early next week. Lazaro also requested assistance with supplemental security income. Provided him phone # to call and initiate application.     ASSESSMENT:     Lazaro easily engaged and pleasant to meet with. Appears mature and independent. Appreciative of SW support. Interested in financial resources.     PLAN:     Social work will continue to assess needs and provide ongoing psychosocial support and access to resources.             CHEY Davila, [facility]  Pediatric Hem/Onc   Phone: [phone]  Pager: [phone]

## 2019-09-06 NOTE — PLAN OF CARE
Afebrile, VSS.  No reports of pain or discomfort.  HOB elevated more than 60 degrees.  Turned up MIVF overnight while pt. Not taking in PO.  Continue to monitor.  Notify team of any changes or concerns.

## 2019-09-06 NOTE — PLAN OF CARE
Pt VSS and afebrile. Lung sounds clear on RA. Complaints of pain 5-6/10, PRN Oxy given x1 with relief. Complaints of mild nausea, PRN Benadryl given x1 with relief. Good UOP, and stool x2 this shift. Eating and drinking well. Mom, dad, siblings and friends at bedside throughout shift. Hourly rounding complete, will continue with plan of care.

## 2019-09-06 NOTE — PROGRESS NOTES
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 He was started on induction therapy per [id number] on 9/1 with dexamethasone substitution, now cycle 1 day 6. Currently stable without evidence of tumor lysis syndrome, hypotension, or tachycardia. Requires continued admission for close clinical monitoring and chemotherapy.    Today:   1) Dexamethasone 6 mg PO BID ( From 9.75mg IV BID)  2) ECHO on 9/7   3) CBC on 9/8  4) Methotrexate IT, Vincristine, Daunorubicin IV on 9/9  5) Spaced TLS labs to every day  6) Melatonin to scheduled      HEME/ONC  #T lymphoblastic lymphoma  -Dexamethasone 6mg PO BID (start 9/1, Day 1-14)  -Daunorubicin and Vincristine (9/1, Due on 9/9, Day 1 and 8)  -Methotrexate IT (Due on 9/9)  -s/p Pegasparginase 9/4  -CBC every other day, next 9/8  -Follow up pending cytogenetics and molecular  -Will likely need C/A/P CT scans when able to lie flat     #Chemotherapy induced nausea  -Zofran q6h  -Benadryl q6h PRN  -Ativan q6h PRN     #Risk of Tumor Lysis Syndrome  -BMP, mag, phos, uric acid qD  -Allopurinol 150 mg to BID   -s/p Rasburicase 6 mg 8/31     CV  #Pericardial effusion: improving  #Orthopnea: improving   #Tamponade Physiology  -Trend pressures on echo, next 9/7   -Cardiology consulted, appreciate recs  -HOB > 60 degrees at all times     #SVC Syndrome Risk   -Clinically monitor for face and neck swelling, chest pain, shortness of breath, headache, blurry vision     ID  -Bactrim ppx M/Tu     RESP  #Left pleural effusion, s/p chest tube placement (9/1)  -Stable on room air   -Chest  tube to suction water seal (9/3) and pulled 9/4. CXR 1hr after chest tube pulled non-concerning     GI  #Risk of gastritis  -IV famotidine 20 mg BID for gut protection while on steroids     #Bowel regimen  -Miralax 1 cap daily  -Senna BID     #Dysphagia (resolved)  -Regular diet     NEURO  -Melatonin 3 mg at bedtime   -Scheduled Tylenol 650 mg PO q6h  -PRN oxycodone 5 mg q6h     Diet: Peds Diet Age 9-18 yrs    Fluids: D5/NS IV/PO titrate  Lines: PICC  DVT Prophylaxis: Low risk/Ambulatory with no VTE prophylaxis  Lara Catheter: not present  Code Status: Full    Disposition Plan   Expected discharge: Monday , recommended to prior living arrangement once respiratory/cardio status stable and inpatient chemo complete.  Entered: Violet Naik 09/06/2019, 9:30 AM       The patient's care was discussed with the Attending Physician, Dr. Hayes.     Violet Naik  Medical Student    Resident/Fellow Attestation   I, Leni Little, was present with the medical student who participated in the service and in the documentation of the note.  I have verified the history and personally performed the physical exam and medical decision making.  I agree with the assessment and plan of care as documented in the note.        Leni Little MD  PGY1  Date of Service (when I saw the patient): 09/06/19    Physician Attestation   I, Alexi Hayes MD, saw this patient with the resident and agree with the resident/fellow's findings and plan of care as documented in the note.      I personally reviewed vital signs, medications and labs.    Key findings:  I also examined the patient and agree with the assessment as noted.    Alexi Hayes MD  Date of Service (when I saw the patient): 9/6/19  '  ______________________________________________________________________    Interval History   There were no acute events overnight. He has been having dry cough, which has reduced from before ,not associated with SOB/chest pain.  Tolerating solid food well with no discomfort while swallowing. Headache, 3/10 intensity, reduced from before with no nausea/vomiting. He has passed stools today.     Data reviewed today: I reviewed all medications, new labs and imaging results over the last 24 hours. I personally reviewed the chest x-ray image(s) showing decreased size of mediastinal mass and pleural effusion since admission.    Physical Exam   Vital Signs: Temp: 97.9  F (36.6  C) Temp src: Oral BP: 114/63 Pulse: 64 Heart Rate: 87 Resp: 18 SpO2: 96 % O2 Device: None (Room air)    Weight: 171 lbs 4.76 oz   Appearance: Alert and appropriate, well developed, nontoxic, no acute distress. Appears comfortable with 60 degree head end elevation.   HEENT: Normocephalic and atraumatic.  Conjunctivae clear. Nares clear with no active discharge.  MMM.  Neck: Supple.  Chest: Dressing at CT site clean and dry.  Pulmonary:  Good air entry, clear to auscultation bilaterally, with no rales, rhonchi, or wheezing.  Cardiovascular: Regular rate and rhythm, normal S1 and S2, with no murmurs.  Normal symmetric peripheral pulses and brisk cap refill.  Abdominal: Soft, nontender, nondistended.  Neurologic: Alert, responds appropriately to questions.  Extremities: No deformity, no peripheral edema  Skin: No significant rashes, ecchymoses, or lacerations on visible skin.     Data   Recent Labs   Lab 09/06/19  0532 09/05/19  1306 09/05/19  0629  09/04/19  0420  09/01/19  1740 09/01/19  1705   WBC 8.2  --   --   --  9.2  --   --  9.5   HGB 12.5*  --   --   --  12.0*  --   --  12.0*   MCV 83  --   --   --  82  --   --  82     --   --   --  304  --   --  398   INR  --   --   --   --  1.20*  --   --   --     141 139   < > 141   < > 144 Canceled, Test credited   POTASSIUM 3.8 3.8 4.2   < > 4.2   < > 3.8 Canceled, Test credited   CHLORIDE 109 108 110*   < > 111*   < > 113* Canceled, Test credited   CO2 25 25 19*   < > 22   < > 23 Canceled, Test credited   BUN 26 21 23    < > 16   < > 11 Canceled, Test credited   CR 0.63* 0.60* 0.61*   < > 0.67   < > 0.81 Canceled, Test credited   ANIONGAP 6 8 10   < > 8   < > 8 Canceled, Test credited   MICHAEL 7.4* 8.0* 7.6*   < > 7.5*   < > 8.0* Canceled, Test credited   * 92 89   < > 146*   < > 149* Canceled, Test credited  Canceled, Test credited   ALBUMIN  --   --  2.3*  --  2.3*   < > 2.7* Canceled, Test credited   PROTTOTAL  --   --   --   --   --   --  6.2* Canceled, Test credited   BILITOTAL  --   --   --   --   --   --  0.3 Canceled, Test credited   ALKPHOS  --   --   --   --   --   --  74 Canceled, Test credited   ALT  --   --   --   --   --   --  23 Canceled, Test credited   AST  --   --   --   --   --   --  8 Canceled, Test credited    < > = values in this interval not displayed.

## 2019-09-06 NOTE — PROGRESS NOTES
09/05/19 1600   Living Environment   Lives With parent(s);sibling(s)   Functional Level Prior   Usual Activity Tolerance good   Current Activity Tolerance moderate   Activity/Exercise/Self-Care Comment Pt is independent with all mobility and exercise at baseline   Age appropriate Yes   Developmentally delayed No   Ambulation 0-->independent   Transferring 0-->independent   Toileting 0-->independent   Bathing 0-->independent   Cognition 0 - no cognition issues reported   Fall history within last six months no   General Information   Onset of Illness/Injury or Date of Surgery - Date 08/30/19   Referring Physician Ellen Garcia MD   Patient/Family Goals  return to prior level of function   Pertinent History of Current Problem (include personal factors and/or comorbidities that impact the POC) Lazaro Lund is a 19 year old male admitted on 8/30/2019 with mediastinal mass, left axillary and cervical adenopathy, and pleural effusion with neoplastic T cells sampled at OSH and subsequently identified pericardial effusion with evidence of tamponade physiology, 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 He was started on induction therapy per [id number] on 9/1 with dexamethasone substitution. Currently stable without evidence of tumor lysis syndrome, hypotension, or tachycardia. Requires continued admission for close clinical monitoring and chemotherapy.   Parent/Caregiver Involvement Attentive to pt needs   Precautions/Limitations immunosuppressed   General Observations Very pleasant and motivated to stay active and strong.    Pain Assessment   Patient Currently in Pain No   Cognitive Status Examination   Orientation orientation to person, place and time   Level of Consciousness alert   Follows Commands and Answers Questions 100% of the time   Personal Safety and Judgment intact   Memory intact   Behavior   Behavior cooperative   Range of Motion (ROM)   Range of Motion Range of Motion is functional   Cervical Range of  Motion  WFL   Trunk Range of Motion  Limited secondary to chest tube site   Upper Extremity Range of Motion  WFL   Lower Extremity Range of Motion  WFL   Strength   Manual Muscle Testing Results Strength is functional   Cervical Strength  WFL   Trunk Strength  WFL   Upper Extremity Strength  WFL   Lower Extremity Strength  WFL   Muscle Tone Assessment   Muscle Tone  Tone is within normal limits   Transfer Skills and Mobility   Bed Mobility Comments Independent   Functional Motor Performance Gross Motor Skills   Coordination Gross Motor Coordination appropriate   Functional Motor Performance-Higher Level Motor Skills   Single :Leg Stance Intact   Gait   Gait Comments Independent    Balance   Balance Comments Good   General Therapy Interventions   Planned Therapy Interventions Therapeutic Procedures   Clinical Impression   Criteria for Skilled Interventions Met (PT) yes;meets criteria;skilled treatment is necessary   PT Diagnosis (PT) At risk for deconditioning   Functional limitations due to impairments impaired mobility   Clinical Presentation Evolving/Changing   Clinical Presentation Rationale Complex medical condition, undergoing chemo   Clinical Decision Making (Complexity) Moderate complexity   Therapy Frequency 2x/week   Predicted Duration of Therapy Intervention (PT) 1 week   Anticipated Discharge Disposition home   Risk & Benefits of therapy have been explained Yes   Patient, Family & other staff in agreement with plan of care Yes   Clinical Impression Comments Lazaro Lund is a 9yo undergoing chemo who will benefit from skilled PT to progress strength and balance as pt is at risk for deconditioning with chronic hospitalizations.   Total Evaluation Time   Total Evaluation Time (Minutes) 10

## 2019-09-07 ENCOUNTER — APPOINTMENT (OUTPATIENT)
Dept: CARDIOLOGY | Facility: CLINIC | Age: 19
DRG: 847 | End: 2019-09-07
Attending: STUDENT IN AN ORGANIZED HEALTH CARE EDUCATION/TRAINING PROGRAM
Payer: COMMERCIAL

## 2019-09-07 LAB
ANION GAP SERPL CALCULATED.3IONS-SCNC: 4 MMOL/L (ref 3–14)
BUN SERPL-MCNC: 19 MG/DL (ref 7–30)
CALCIUM SERPL-MCNC: 7.6 MG/DL (ref 8.5–10.1)
CHLORIDE SERPL-SCNC: 106 MMOL/L (ref 94–109)
CO2 SERPL-SCNC: 28 MMOL/L (ref 20–32)
CREAT SERPL-MCNC: 0.55 MG/DL (ref 0.66–1.25)
GFR SERPL CREATININE-BSD FRML MDRD: >90 ML/MIN/{1.73_M2}
GLUCOSE SERPL-MCNC: 125 MG/DL (ref 70–99)
MAGNESIUM SERPL-MCNC: 2.1 MG/DL (ref 1.6–2.3)
PHOSPHATE SERPL-MCNC: 4.1 MG/DL (ref 2.5–4.5)
POTASSIUM SERPL-SCNC: 4.1 MMOL/L (ref 3.4–5.3)
SODIUM SERPL-SCNC: 138 MMOL/L (ref 133–144)
URATE SERPL-MCNC: 2 MG/DL (ref 3.5–7.2)

## 2019-09-07 PROCEDURE — 25000128 H RX IP 250 OP 636: Performed by: RADIOLOGY

## 2019-09-07 PROCEDURE — 84550 ASSAY OF BLOOD/URIC ACID: CPT | Performed by: STUDENT IN AN ORGANIZED HEALTH CARE EDUCATION/TRAINING PROGRAM

## 2019-09-07 PROCEDURE — 25000132 ZZH RX MED GY IP 250 OP 250 PS 637: Performed by: STUDENT IN AN ORGANIZED HEALTH CARE EDUCATION/TRAINING PROGRAM

## 2019-09-07 PROCEDURE — 36592 COLLECT BLOOD FROM PICC: CPT | Performed by: STUDENT IN AN ORGANIZED HEALTH CARE EDUCATION/TRAINING PROGRAM

## 2019-09-07 PROCEDURE — 25000131 ZZH RX MED GY IP 250 OP 636 PS 637: Performed by: STUDENT IN AN ORGANIZED HEALTH CARE EDUCATION/TRAINING PROGRAM

## 2019-09-07 PROCEDURE — 80048 BASIC METABOLIC PNL TOTAL CA: CPT | Performed by: STUDENT IN AN ORGANIZED HEALTH CARE EDUCATION/TRAINING PROGRAM

## 2019-09-07 PROCEDURE — 25000132 ZZH RX MED GY IP 250 OP 250 PS 637

## 2019-09-07 PROCEDURE — 25000128 H RX IP 250 OP 636: Performed by: STUDENT IN AN ORGANIZED HEALTH CARE EDUCATION/TRAINING PROGRAM

## 2019-09-07 PROCEDURE — 83735 ASSAY OF MAGNESIUM: CPT | Performed by: STUDENT IN AN ORGANIZED HEALTH CARE EDUCATION/TRAINING PROGRAM

## 2019-09-07 PROCEDURE — 84100 ASSAY OF PHOSPHORUS: CPT | Performed by: STUDENT IN AN ORGANIZED HEALTH CARE EDUCATION/TRAINING PROGRAM

## 2019-09-07 PROCEDURE — 93306 TTE W/DOPPLER COMPLETE: CPT

## 2019-09-07 PROCEDURE — 25800030 ZZH RX IP 258 OP 636: Performed by: STUDENT IN AN ORGANIZED HEALTH CARE EDUCATION/TRAINING PROGRAM

## 2019-09-07 PROCEDURE — 12000014 ZZH R&B PEDS UMMC

## 2019-09-07 RX ORDER — FAMOTIDINE 20 MG/1
20 TABLET, FILM COATED ORAL DAILY
Status: DISCONTINUED | OUTPATIENT
Start: 2019-09-07 | End: 2019-09-09 | Stop reason: HOSPADM

## 2019-09-07 RX ADMIN — DEXAMETHASONE 6 MG: 2 TABLET ORAL at 20:12

## 2019-09-07 RX ADMIN — ACETAMINOPHEN 650 MG: 325 TABLET, FILM COATED ORAL at 08:16

## 2019-09-07 RX ADMIN — POLYETHYLENE GLYCOL 3350 17 G: 17 POWDER, FOR SOLUTION ORAL at 08:16

## 2019-09-07 RX ADMIN — ACETAMINOPHEN 650 MG: 325 TABLET, FILM COATED ORAL at 13:14

## 2019-09-07 RX ADMIN — FAMOTIDINE 20 MG: 20 TABLET, FILM COATED ORAL at 13:14

## 2019-09-07 RX ADMIN — ACETAMINOPHEN 650 MG: 325 TABLET, FILM COATED ORAL at 20:12

## 2019-09-07 RX ADMIN — ACETAMINOPHEN 650 MG: 325 TABLET, FILM COATED ORAL at 02:12

## 2019-09-07 RX ADMIN — Medication 3 MG: at 22:09

## 2019-09-07 RX ADMIN — Medication 150 MG: at 08:16

## 2019-09-07 RX ADMIN — Medication 3 ML: at 08:16

## 2019-09-07 RX ADMIN — DEXTROSE AND SODIUM CHLORIDE 1000 ML: 5; 900 INJECTION, SOLUTION INTRAVENOUS at 05:16

## 2019-09-07 RX ADMIN — DEXAMETHASONE 6 MG: 2 TABLET ORAL at 08:16

## 2019-09-07 RX ADMIN — SODIUM CHLORIDE, PRESERVATIVE FREE 3 ML: 5 INJECTION INTRAVENOUS at 08:00

## 2019-09-07 RX ADMIN — Medication 150 MG: at 20:12

## 2019-09-07 ASSESSMENT — MIFFLIN-ST. JEOR: SCORE: 1796.62

## 2019-09-07 NOTE — PLAN OF CARE
Afebrile.  Lungs clear, sating good on RA.  Continues to have dry cough, although pt stated less frequent.  VSS.  Pt complained of headache 2/10 earlier in the day, and 4/10 this evening, managed with scheduled tylenol.  No s/sx of nausea.  Good intake of food and fluids PO, PICC infusing at TKO.  Good UO.  Formed stool x3 during shift.  Dressing on L side of chest CDI.  Encouraged to walk around a little more.  Hourly rounding completed.  Continue with POC.

## 2019-09-07 NOTE — PLAN OF CARE
RN from [id number]. Afebrile. VSS. Headache complaint at 1900, tylenol given with relief, no other pain complaints. Dry cough continues per pt unchanged, lungs clear. PICC dressing C/D/I, MIVF running without any issues.  Chest tube dressing site C/D/I. Good UOP and stool x2. Pt refused a shower in evenings but asked to shower in the morning. Mom at bedside, attentive to pt and update on POC. Hourly rounding complete. Continue to monitor pt and update MD with any changes.

## 2019-09-07 NOTE — PROGRESS NOTES
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 He was started on induction therapy per [id number] on 9/1 with dexamethasone substitution, now cycle 1 day 7. Currently stable without evidence of tumor lysis syndrome, hypotension, or tachycardia. Requires continued admission for close clinical monitoring and chemotherapy.    Today:   - Echo today   - switched Pepcid from IV to oral     HEME/ONC  #T lymphoblastic lymphoma  -Dexamethasone 6mg PO BID (start 9/1, Day 1-14)  -Daunorubicin and Vincristine (9/1, Due on 9/9, Day 1 and 8)  -Methotrexate IT (Due on 9/9)  -s/p Pegasparginase 9/4  -CBC every other day, next 9/8  -Follow up pending cytogenetics and molecular  -Will likely need C/A/P CT scans when able to lie flat     #Chemotherapy induced nausea  -Zofran q6h  -Benadryl q6h PRN  -Ativan q6h PRN     #Risk of Tumor Lysis Syndrome  -BMP, mag, phos, uric acid qD  -Allopurinol 150 mg to BID   -s/p Rasburicase 6 mg 8/31     CV  #Pericardial effusion: improving  #Orthopnea: improving   #Tamponade Physiology  -Trend pressures on echo, next 9/7   -Cardiology consulted, appreciate recs  -HOB > 60 degrees at all times     #SVC Syndrome Risk   -Clinically monitor for face and neck swelling, chest pain, shortness of breath, headache, blurry vision     ID  -Bactrim ppx M/Tu     RESP  #Left pleural effusion, s/p chest tube placement (9/1)  -Stable on room air   -Chest tube to suction water seal (9/3) and pulled 9/4. CXR 1hr after chest tube pulled non-concerning     GI  #Risk of gastritis  -  PO famotidine, 20mg daily      #Bowel regimen  -Miralax 1 cap daily  -Senna BID     #Dysphagia (resolved)  -Regular diet     NEURO  -Melatonin 3 mg at bedtime   -Scheduled Tylenol 650 mg PO q6h  -PRN oxycodone 5 mg q6h     Diet: Peds Diet Age 9-18 yrs    Fluids: D5/NS IV/PO titrate  Lines: PICC  DVT Prophylaxis: Low risk/Ambulatory with no VTE prophylaxis  Lara Catheter: not present  Code Status: Full    Disposition Plan   Expected discharge: Monday , recommended to prior living arrangement once respiratory/cardio status stable and inpatient chemo complete.  Entered: Serenity Worley MD 09/07/2019, 12:22 PM      Serenity Valencia MD  [facility] [address]  p: [phone]    Physician Attestation   I, Alexi Hayes MD, saw this patient with the resident and agree with the resident/fellow's findings and plan of care as documented in the note.      I personally reviewed vital signs, medications and labs.    Key findings:  I also examined the patient and agree with the assessment as noted.    Alexi Hayes MD  Date of Service (when I saw the patient): 9/7/19    ________________________________________________________________    Interval History     Lazaro did well overnight. No acute events. He continues to be comfortable on current regimen. Echo today. Will be inpatient until Monday when he gets IT chemo. Family updated at the bedside.     Data reviewed today: I reviewed all medications, new labs and imaging results over the last 24 hours.    Physical Exam   Vital Signs: Temp: 98.6  F (37  C) Temp src: Axillary BP: 107/78 Pulse: 75 Heart Rate: 80 Resp: 20 SpO2: 98 % O2 Device: None (Room air)    Weight: 169 lbs 5.01 oz   Appearance: Alert and appropriate, well developed in no distress.    HEENT: Normocephalic and atraumatic.  Conjunctivae clear. Nares clear with no active discharge. MMM.  Neck: Supple.  Chest: Dressing at CT site clean and dry.  Pulmonary:  Good air entry, clear to auscultation  bilaterally, with no rales, rhonchi, or wheezing.  Cardiovascular: Regular rate and rhythm, normal S1 and S2, with no murmurs.  Normal symmetric peripheral pulses and brisk cap refill.  Abdominal: Soft, nontender, nondistended.  Neurologic: Alert, responds appropriately to questions.  Extremities: No deformity, no peripheral edema  Skin: No significant rashes, ecchymoses, or lacerations on visible skin.     Data     Results for orders placed or performed during the hospital encounter of 08/30/19 (from the past 24 hour(s))   Basic metabolic panel   Result Value Ref Range    Sodium 138 133 - 144 mmol/L    Potassium 4.1 3.4 - 5.3 mmol/L    Chloride 106 94 - 109 mmol/L    Carbon Dioxide 28 20 - 32 mmol/L    Anion Gap 4 3 - 14 mmol/L    Glucose 125 (H) 70 - 99 mg/dL    Urea Nitrogen 19 7 - 30 mg/dL    Creatinine 0.55 (L) 0.66 - 1.25 mg/dL    GFR Estimate >90 >60 mL/min/[1.73_m2]    GFR Estimate If Black >90 >60 mL/min/[1.73_m2]    Calcium 7.6 (L) 8.5 - 10.1 mg/dL   Magnesium   Result Value Ref Range    Magnesium 2.1 1.6 - 2.3 mg/dL   Phosphorus   Result Value Ref Range    Phosphorus 4.1 2.5 - 4.5 mg/dL   Uric acid   Result Value Ref Range    Uric Acid 2.0 (L) 3.5 - 7.2 mg/dL

## 2019-09-07 NOTE — PLAN OF CARE
VSS. Afebrile. Pt c/o of headache tylenol given  with help. Otherwise  tolerating po well. Good UOP.  IVF has been stopped this  morning pt  has been doing well with po  intake.  Mom at bedside attentive and assist with care. Continue  plan of care and  monitor

## 2019-09-08 ENCOUNTER — ANESTHESIA EVENT (OUTPATIENT)
Dept: SURGERY | Facility: CLINIC | Age: 19
DRG: 847 | End: 2019-09-08
Payer: COMMERCIAL

## 2019-09-08 ENCOUNTER — APPOINTMENT (OUTPATIENT)
Dept: PHYSICAL THERAPY | Facility: CLINIC | Age: 19
DRG: 847 | End: 2019-09-08
Attending: STUDENT IN AN ORGANIZED HEALTH CARE EDUCATION/TRAINING PROGRAM
Payer: COMMERCIAL

## 2019-09-08 LAB
ANION GAP SERPL CALCULATED.3IONS-SCNC: 4 MMOL/L (ref 3–14)
BASOPHILS # BLD AUTO: 0 10E9/L (ref 0–0.2)
BASOPHILS NFR BLD AUTO: 0 %
BUN SERPL-MCNC: 18 MG/DL (ref 7–30)
CALCIUM SERPL-MCNC: 7.7 MG/DL (ref 8.5–10.1)
CHLORIDE SERPL-SCNC: 105 MMOL/L (ref 94–109)
CO2 SERPL-SCNC: 28 MMOL/L (ref 20–32)
CREAT SERPL-MCNC: 0.55 MG/DL (ref 0.66–1.25)
DIFFERENTIAL METHOD BLD: ABNORMAL
EOSINOPHIL # BLD AUTO: 0 10E9/L (ref 0–0.7)
EOSINOPHIL NFR BLD AUTO: 0 %
ERYTHROCYTE [DISTWIDTH] IN BLOOD BY AUTOMATED COUNT: 11.8 % (ref 10–15)
GFR SERPL CREATININE-BSD FRML MDRD: >90 ML/MIN/{1.73_M2}
GLUCOSE SERPL-MCNC: 88 MG/DL (ref 70–99)
HCT VFR BLD AUTO: 39.9 % (ref 40–53)
HGB BLD-MCNC: 12.9 G/DL (ref 13.3–17.7)
IMM GRANULOCYTES # BLD: 0 10E9/L (ref 0–0.4)
IMM GRANULOCYTES NFR BLD: 0.5 %
LYMPHOCYTES # BLD AUTO: 0.8 10E9/L (ref 0.8–5.3)
LYMPHOCYTES NFR BLD AUTO: 10.3 %
MAGNESIUM SERPL-MCNC: 2.3 MG/DL (ref 1.6–2.3)
MCH RBC QN AUTO: 26.4 PG (ref 26.5–33)
MCHC RBC AUTO-ENTMCNC: 32.3 G/DL (ref 31.5–36.5)
MCV RBC AUTO: 82 FL (ref 78–100)
MONOCYTES # BLD AUTO: 0.1 10E9/L (ref 0–1.3)
MONOCYTES NFR BLD AUTO: 1.7 %
NEUTROPHILS # BLD AUTO: 7.1 10E9/L (ref 1.6–8.3)
NEUTROPHILS NFR BLD AUTO: 87.5 %
NRBC # BLD AUTO: 0 10*3/UL
NRBC BLD AUTO-RTO: 0 /100
PHOSPHATE SERPL-MCNC: 4 MG/DL (ref 2.5–4.5)
PLATELET # BLD AUTO: 287 10E9/L (ref 150–450)
POTASSIUM SERPL-SCNC: 4.6 MMOL/L (ref 3.4–5.3)
RBC # BLD AUTO: 4.89 10E12/L (ref 4.4–5.9)
SODIUM SERPL-SCNC: 137 MMOL/L (ref 133–144)
URATE SERPL-MCNC: 2.4 MG/DL (ref 3.5–7.2)
WBC # BLD AUTO: 8.1 10E9/L (ref 4–11)

## 2019-09-08 PROCEDURE — 25000132 ZZH RX MED GY IP 250 OP 250 PS 637: Performed by: STUDENT IN AN ORGANIZED HEALTH CARE EDUCATION/TRAINING PROGRAM

## 2019-09-08 PROCEDURE — 84550 ASSAY OF BLOOD/URIC ACID: CPT | Performed by: STUDENT IN AN ORGANIZED HEALTH CARE EDUCATION/TRAINING PROGRAM

## 2019-09-08 PROCEDURE — 25000128 H RX IP 250 OP 636: Performed by: RADIOLOGY

## 2019-09-08 PROCEDURE — 85025 COMPLETE CBC W/AUTO DIFF WBC: CPT | Performed by: STUDENT IN AN ORGANIZED HEALTH CARE EDUCATION/TRAINING PROGRAM

## 2019-09-08 PROCEDURE — 12000014 ZZH R&B PEDS UMMC

## 2019-09-08 PROCEDURE — 84100 ASSAY OF PHOSPHORUS: CPT | Performed by: STUDENT IN AN ORGANIZED HEALTH CARE EDUCATION/TRAINING PROGRAM

## 2019-09-08 PROCEDURE — 25000131 ZZH RX MED GY IP 250 OP 636 PS 637: Performed by: PEDIATRICS

## 2019-09-08 PROCEDURE — 83735 ASSAY OF MAGNESIUM: CPT | Performed by: STUDENT IN AN ORGANIZED HEALTH CARE EDUCATION/TRAINING PROGRAM

## 2019-09-08 PROCEDURE — 80048 BASIC METABOLIC PNL TOTAL CA: CPT | Performed by: STUDENT IN AN ORGANIZED HEALTH CARE EDUCATION/TRAINING PROGRAM

## 2019-09-08 PROCEDURE — 97530 THERAPEUTIC ACTIVITIES: CPT | Mod: GP

## 2019-09-08 PROCEDURE — 25000128 H RX IP 250 OP 636: Performed by: STUDENT IN AN ORGANIZED HEALTH CARE EDUCATION/TRAINING PROGRAM

## 2019-09-08 RX ORDER — DEXAMETHASONE 2 MG/1
6 TABLET ORAL EVERY 12 HOURS SCHEDULED
Status: COMPLETED | OUTPATIENT
Start: 2019-09-08 | End: 2019-09-08

## 2019-09-08 RX ORDER — DEXAMETHASONE 2 MG/1
6 TABLET ORAL EVERY 12 HOURS SCHEDULED
Status: DISCONTINUED | OUTPATIENT
Start: 2019-09-08 | End: 2019-09-08

## 2019-09-08 RX ORDER — DIPHENHYDRAMINE HCL 25 MG
25 CAPSULE ORAL
Status: DISCONTINUED | OUTPATIENT
Start: 2019-09-08 | End: 2019-09-09 | Stop reason: HOSPADM

## 2019-09-08 RX ADMIN — Medication 3 MG: at 22:11

## 2019-09-08 RX ADMIN — SODIUM CHLORIDE, PRESERVATIVE FREE 3 ML: 5 INJECTION INTRAVENOUS at 09:05

## 2019-09-08 RX ADMIN — ACETAMINOPHEN 650 MG: 325 TABLET, FILM COATED ORAL at 07:55

## 2019-09-08 RX ADMIN — Medication 150 MG: at 07:55

## 2019-09-08 RX ADMIN — ACETAMINOPHEN 650 MG: 325 TABLET, FILM COATED ORAL at 20:34

## 2019-09-08 RX ADMIN — DEXAMETHASONE 6 MG: 2 TABLET ORAL at 20:34

## 2019-09-08 RX ADMIN — FAMOTIDINE 20 MG: 20 TABLET, FILM COATED ORAL at 07:55

## 2019-09-08 RX ADMIN — DEXAMETHASONE 6 MG: 2 TABLET ORAL at 12:50

## 2019-09-08 RX ADMIN — ACETAMINOPHEN 650 MG: 325 TABLET, FILM COATED ORAL at 14:11

## 2019-09-08 RX ADMIN — Medication 150 MG: at 20:34

## 2019-09-08 RX ADMIN — ACETAMINOPHEN 650 MG: 325 TABLET, FILM COATED ORAL at 01:49

## 2019-09-08 RX ADMIN — HEPARIN, PORCINE (PF) 10 UNIT/ML INTRAVENOUS SYRINGE 2 ML: at 05:19

## 2019-09-08 ASSESSMENT — MIFFLIN-ST. JEOR: SCORE: 1794.62

## 2019-09-08 NOTE — PROGRESS NOTES
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 He was started on induction therapy per [id number] on 9/1 with dexamethasone substitution, now cycle 1 day 8. Currently stable without evidence of tumor lysis syndrome, hypotension, or tachycardia. Requires continued admission for close clinical monitoring and chemotherapy.    Today:   No changes    HEME/ONC  #T lymphoblastic lymphoma  -Dexamethasone 6mg PO BID (start 9/1, Day 1-14)  -Daunorubicin and Vincristine (9/1, Due on 9/9, Day 1 and 8)  -Methotrexate IT (Due on 9/9)  -s/p Pegasparginase 9/4  -CBC every other day, next 9/8  -Follow up pending cytogenetics and molecular  -Will likely need C/A/P CT scans when able to lie flat     #Chemotherapy induced nausea  -Zofran q6h  -Benadryl q6h PRN  -Ativan q6h PRN     #Risk of Tumor Lysis Syndrome  -BMP, mag, phos, uric acid qD  -Allopurinol 150 mg to BID   -s/p Rasburicase 6 mg 8/31     CV  #Pericardial effusion: improving  #Orthopnea: improving   #Tamponade Physiology  -Trend pressures on echo, next 9/7   -Cardiology consulted, appreciate recs  -HOB > 60 degrees at all times     #SVC Syndrome Risk   -Clinically monitor for face and neck swelling, chest pain, shortness of breath, headache, blurry vision     ID  -Bactrim ppx M/Tu     RESP  #Left pleural effusion, s/p chest tube placement (9/1)  -Stable on room air   -Chest tube to suction water seal (9/3) and pulled 9/4. CXR 1hr after chest tube pulled non-concerning     GI  #Risk of gastritis  - PO famotidine, 20mg daily      #Bowel regimen  -Miralax 1 cap  daily  -Senna BID     #Dysphagia (resolved)  -Regular diet     NEURO  -Melatonin 3 mg at bedtime   -Scheduled Tylenol 650 mg PO q6h  -PRN oxycodone 5 mg q6h     Diet: Peds Diet Age 9-18 yrs    Fluids: D5/NS IV/PO titrate  Lines: PICC  DVT Prophylaxis: Low risk/Ambulatory with no VTE prophylaxis  Lara Catheter: not present  Code Status: Full    Disposition Plan   Expected discharge: Monday , recommended to prior living arrangement once respiratory/cardio status stable and inpatient chemo complete.  Entered: Leni Little MD 09/08/2019, 7:14 AM      Leni Little MD, DPhil  Pediatric Resident, PGY-1  [facility]    Physician Attestation   I, Alexi Hayes MD, personally examined and evaluated this patient.  I discussed the patient with the resident/fellow and care team, and agree with the assessment and plan of care as documented in the note of 8/30/19.      I personally reviewed vital signs, medications and labs.    Key findings:  I also examined the patient and agree with the assessment as noted.  Alexi Hayes MD  Date of Service (when I saw the patient): 9/8/19  ________________________________________________________________    Interval History     NAEO. Afebrile. Tolerating regular diet without emesis. Stooling. No PRN oxycodone for pain.    Data reviewed today: I reviewed all medications, new labs and imaging results over the last 24 hours.    Physical Exam   Vital Signs: Temp: 96.9  F (36.1  C) Temp src: Axillary BP: 103/71 Pulse: 64 Heart Rate: 71 Resp: 16 SpO2: 96 % O2 Device: None (Room air)    Weight: 166 lbs 10.68 oz   Appearance: Alert and appropriate, well developed in no distress.    HEENT: Normocephalic and atraumatic.  Conjunctivae clear. Nares clear with no active discharge. MMM.  Neck: Supple.  Chest: Dressing at CT site clean and dry.  Pulmonary:  Good air entry, clear to auscultation bilaterally, with no rales, rhonchi, or  wheezing.  Cardiovascular: Regular rate and rhythm, normal S1 and S2, with no murmurs.  Normal symmetric peripheral pulses and brisk cap refill.  Abdominal: Soft, nontender, nondistended.  Neurologic: Alert, responds appropriately to questions.  Extremities: No deformity, no peripheral edema  Skin: No significant rashes, ecchymoses, or lacerations on visible skin.     Data     Results for orders placed or performed during the hospital encounter of 19 (from the past 24 hour(s))   Echo Pediatric (TTE) Complete    Narrative    [id number]  [id number]  [id number]  [id number]^ELISABETH^CINDY^KURT                                                                   Study ID: [id number]                                                 [facility] [place] [address]                                                Phone: [phone]                                Pediatric Echocardiogram  _____________________________________________________________________________  __     Name: PLACIDO RODRIGUEZ  Study Date: 2019 11:36 AM             Patient Location: URU5  MRN: [id number]                             Age: 19 yrs  : 2000  Gender: Male  Patient Class: Inpatient                    Height: 72 in  Ordering Provider: CINDY BARBER             Weight: 169 lb                                              BSA: 2.0 m2  Performed By: Jones Peacock RDCS  Report approved by: Savanah Butler MD  Reason For Study: Other, Please Specify in Comments  _____________________________________________________________________________  __     ------CONCLUSIONS------  Normal intracardiac connections. The left and right ventricles have normal  chamber size, wall thickness, and systolic function. There is a large anterior  mediastinal mass, previously  causing compression of the left atrium, right and  left pulmonary arteries, and SVC. There is mild compression and flow  acceleration through the mid LA. Unobstructed flow in the branch pulmonary  arteries and SVC. No pericardial effusion.  _____________________________________________________________________________  __        Technical information:  A complete two dimensional, MMODE, spectral and color Doppler transthoracic  echocardiogram is performed. Images are obtained from parasternal, apical,  subcostal and suprasternal notch views. The study quality is adequate.  Technically difficult study due to poor acoustic windows. The subcostal views  were difficult to obtain and are suboptimal in quality. No ECG tracing  available.     Segmental Anatomy:  There is normal atrial arrangement, with concordant atrioventricular and  ventriculoarterial connections.     Systemic and pulmonary veins:  The systemic venous return is normal. The inferior vena cava is of normal  caliber. Color flow demonstrates flow from three pulmonary veins entering the  left atrium.     Atria and atrial septum:  Normal right atrial size. There is mild compression and flow acceleration  through the mid level of the LA. The atrial septum is not well visualized.        Atrioventricular valves:  The tricuspid valve is normal in appearance and motion. Trivial tricuspid  valve insufficiency. Insufficient jet to estimate right ventricular systolic  pressure. The mitral valve is normal in appearance and motion. There is no  mitral valve insufficiency.     Ventricles and Ventricular Septum:  The left and right ventricles have normal chamber size, wall thickness, and  systolic function. The calculated biplane left ventricular ejection fraction  is 55 %. There is no ventricular level shunting.     Outflow tracts:  Normal great artery relationship. There is unobstructed flow through the right  ventricular outflow tract. The pulmonary valve motion is  normal. There is  normal flow across the pulmonary valve. Trivial pulmonary valve insufficiency.  There is unobstructed flow through the left ventricular outflow tract.  Tricuspid aortic valve with normal appearance and motion. There is normal flow  across the aortic valve.     Great arteries:  The main pulmonary artery has normal appearance. There is unobstructed flow in  the main pulmonary artery. The pulmonary artery bifurcation is normal. There  is unobstructed flow in both branch pulmonary arteries. Normal ascending  aorta. The aortic arch appears normal. There is unobstructed antegrade flow in  the ascending, transverse arch, descending thoracic and abdominal aorta.     Arterial Shunts:  The ductal region is not imaged with this study.     Coronaries:  The coronary arteries are not evaluated.        Effusions, catheters, cannulas and leads:  No pericardial effusion.     MMode/2D Measurements & Calculations  LA dimension: 2.7 cm                       Ao root diam: 3.1 cm  LA/Ao: 0.87                                2 Chamber EF: 57.0 %  4 Chamber EF: 52.0 %                       EF Biplane: 54.0 %  LVMI(BSA): 89.0 grams/m2                   LVMI(Height): 34.4     RWT(MM): 0.44     Doppler Measurements & Calculations  PI end-d marialuisa: 88.2 cm/sec  PI end-d PG: 3.1 mmHg     [place] 2D Z-SCORE VALUES  Measurement NameValue Z-ScorePredictedNormal Range  LVLd apical(4ch)8.4 cm  LVLs apical(4ch)7.2 cm     [employer] Z-Scores (Measurements & Calculations)  Measurement NameValue      Z-ScorePredictedNormal Range  IVSd(MM)        0.92 cm    -0.59  1.0      0.71 - 1.32  IVSs(MM)        1.5 cm     0.87   1.4      1.00 - 1.76  LVIDd(MM)       4.9 cm     -0.76  5.2      4.4 - 6.0  LVIDs(MM)       2.6 cm     -2.1   3.4      2.6 - 4.1  LVPWd(MM)       1.1 cm     0.92   0.95     0.70 - 1.21  LVPWs(MM)       1.7 cm     0.71   1.6      1.2 - 1.9  LV mass(C)d(MM) 175.9 grams-0.38  189.9    128.2 - 281.2  FS(MM)          47.7 %     3.1     34.9     28.5 - 42.6           Report approved by: Jocy Flower 09/07/2019 01:51 PM

## 2019-09-08 NOTE — PLAN OF CARE
VSS. Afebrile. Minimal headache  controlled with scheduled tylenol. Otherwise eating and drinking well.  Good UOP and stool  Chest tube site dressing off  The site looked  healed .   Pt is NPO  at midnight for IT chemo tomorrow.  Lots of visitor and pt in good sprit.   Continue plan of care and monitor

## 2019-09-08 NOTE — PLAN OF CARE
PT Unit 5: Lazaro was seen by PT to review HEP, answer questions regarding HEP and activity in hospital. Pt likely to discharge tomorrow, denied questions. Educated pt on follow up PT in future admissions should pt develop a need. Pt verbalized understanding with all education.  Discharge Planner PT   Patient plan for discharge: Home tomorrow 9/9  Current status: Independent with all mobility, independent with HEP including standing exercises and ambulation  Barriers to return to prior living situation: Medical POC  Recommendations for discharge: Home with HEP, no OP PT or IP PT required  Rationale for recommendations: No weakness noted, follows through with HEP, knows chemo risks for foot drop, no ongoing needs at this time. May request ergometer for future admissions.       Entered by: Tessy Oliver 09/08/2019 3:26 PM     Tessy Oliver, PT, [phone]

## 2019-09-08 NOTE — PLAN OF CARE
AVSS. Patient not reporting any pain. Good PO intake and good urine/stool output. Up ad chase in his room. PICC heparin locked.

## 2019-09-09 ENCOUNTER — ANESTHESIA (OUTPATIENT)
Dept: SURGERY | Facility: CLINIC | Age: 19
DRG: 847 | End: 2019-09-09
Payer: COMMERCIAL

## 2019-09-09 ENCOUNTER — HOME INFUSION (PRE-WILLOW HOME INFUSION) (OUTPATIENT)
Dept: PHARMACY | Facility: CLINIC | Age: 19
End: 2019-09-09

## 2019-09-09 VITALS
OXYGEN SATURATION: 99 % | SYSTOLIC BLOOD PRESSURE: 107 MMHG | RESPIRATION RATE: 16 BRPM | WEIGHT: 166.23 LBS | HEART RATE: 49 BPM | TEMPERATURE: 98 F | DIASTOLIC BLOOD PRESSURE: 57 MMHG | HEIGHT: 72 IN | BODY MASS INDEX: 22.51 KG/M2

## 2019-09-09 LAB
ANION GAP SERPL CALCULATED.3IONS-SCNC: 4 MMOL/L (ref 3–14)
BUN SERPL-MCNC: 22 MG/DL (ref 7–30)
CALCIUM SERPL-MCNC: 7.5 MG/DL (ref 8.5–10.1)
CHLORIDE SERPL-SCNC: 105 MMOL/L (ref 94–109)
CO2 SERPL-SCNC: 27 MMOL/L (ref 20–32)
COPATH REPORT: NORMAL
CREAT SERPL-MCNC: 0.51 MG/DL (ref 0.66–1.25)
GFR SERPL CREATININE-BSD FRML MDRD: >90 ML/MIN/{1.73_M2}
GLUCOSE SERPL-MCNC: 109 MG/DL (ref 70–99)
MAGNESIUM SERPL-MCNC: 2 MG/DL (ref 1.6–2.3)
PHOSPHATE SERPL-MCNC: 3.4 MG/DL (ref 2.5–4.5)
POTASSIUM SERPL-SCNC: 4.2 MMOL/L (ref 3.4–5.3)
SODIUM SERPL-SCNC: 136 MMOL/L (ref 133–144)
URATE SERPL-MCNC: 1.9 MG/DL (ref 3.5–7.2)

## 2019-09-09 PROCEDURE — 25000132 ZZH RX MED GY IP 250 OP 250 PS 637: Performed by: STUDENT IN AN ORGANIZED HEALTH CARE EDUCATION/TRAINING PROGRAM

## 2019-09-09 PROCEDURE — 71000015 ZZH RECOVERY PHASE 1 LEVEL 2 EA ADDTL HR: Performed by: PEDIATRICS

## 2019-09-09 PROCEDURE — 25000128 H RX IP 250 OP 636: Performed by: STUDENT IN AN ORGANIZED HEALTH CARE EDUCATION/TRAINING PROGRAM

## 2019-09-09 PROCEDURE — 3E0R305 INTRODUCTION OF OTHER ANTINEOPLASTIC INTO SPINAL CANAL, PERCUTANEOUS APPROACH: ICD-10-PCS | Performed by: PEDIATRICS

## 2019-09-09 PROCEDURE — 89050 BODY FLUID CELL COUNT: CPT | Performed by: PEDIATRICS

## 2019-09-09 PROCEDURE — 25800030 ZZH RX IP 258 OP 636: Performed by: STUDENT IN AN ORGANIZED HEALTH CARE EDUCATION/TRAINING PROGRAM

## 2019-09-09 PROCEDURE — 36000045 ZZH SURGERY LEVEL 1 1ST 30 MIN - UMMC: Performed by: PEDIATRICS

## 2019-09-09 PROCEDURE — 84550 ASSAY OF BLOOD/URIC ACID: CPT | Performed by: STUDENT IN AN ORGANIZED HEALTH CARE EDUCATION/TRAINING PROGRAM

## 2019-09-09 PROCEDURE — 83735 ASSAY OF MAGNESIUM: CPT | Performed by: STUDENT IN AN ORGANIZED HEALTH CARE EDUCATION/TRAINING PROGRAM

## 2019-09-09 PROCEDURE — 37000008 ZZH ANESTHESIA TECHNICAL FEE, 1ST 30 MIN: Performed by: PEDIATRICS

## 2019-09-09 PROCEDURE — 80048 BASIC METABOLIC PNL TOTAL CA: CPT | Performed by: STUDENT IN AN ORGANIZED HEALTH CARE EDUCATION/TRAINING PROGRAM

## 2019-09-09 PROCEDURE — 88108 CYTOPATH CONCENTRATE TECH: CPT | Mod: 26 | Performed by: PEDIATRICS

## 2019-09-09 PROCEDURE — 40000170 ZZH STATISTIC PRE-PROCEDURE ASSESSMENT II: Performed by: PEDIATRICS

## 2019-09-09 PROCEDURE — 00000102 ZZHCL STATISTIC CYTO WRIGHT STAIN TC: Performed by: PEDIATRICS

## 2019-09-09 PROCEDURE — 25000131 ZZH RX MED GY IP 250 OP 636 PS 637: Performed by: STUDENT IN AN ORGANIZED HEALTH CARE EDUCATION/TRAINING PROGRAM

## 2019-09-09 PROCEDURE — 84100 ASSAY OF PHOSPHORUS: CPT | Performed by: STUDENT IN AN ORGANIZED HEALTH CARE EDUCATION/TRAINING PROGRAM

## 2019-09-09 PROCEDURE — 009U3ZX DRAINAGE OF SPINAL CANAL, PERCUTANEOUS APPROACH, DIAGNOSTIC: ICD-10-PCS | Performed by: PEDIATRICS

## 2019-09-09 PROCEDURE — 36592 COLLECT BLOOD FROM PICC: CPT | Performed by: STUDENT IN AN ORGANIZED HEALTH CARE EDUCATION/TRAINING PROGRAM

## 2019-09-09 PROCEDURE — 37000009 ZZH ANESTHESIA TECHNICAL FEE, EACH ADDTL 15 MIN: Performed by: PEDIATRICS

## 2019-09-09 PROCEDURE — 71000014 ZZH RECOVERY PHASE 1 LEVEL 2 FIRST HR: Performed by: PEDIATRICS

## 2019-09-09 PROCEDURE — 25800030 ZZH RX IP 258 OP 636: Performed by: NURSE ANESTHETIST, CERTIFIED REGISTERED

## 2019-09-09 PROCEDURE — 25000128 H RX IP 250 OP 636: Performed by: NURSE ANESTHETIST, CERTIFIED REGISTERED

## 2019-09-09 PROCEDURE — 25000128 H RX IP 250 OP 636: Performed by: RADIOLOGY

## 2019-09-09 PROCEDURE — 88108 CYTOPATH CONCENTRATE TECH: CPT | Performed by: PEDIATRICS

## 2019-09-09 PROCEDURE — 25000125 ZZHC RX 250: Performed by: NURSE ANESTHETIST, CERTIFIED REGISTERED

## 2019-09-09 RX ORDER — LORAZEPAM 2 MG/ML
.5-2 INJECTION INTRAMUSCULAR EVERY 6 HOURS PRN
Status: DISCONTINUED | OUTPATIENT
Start: 2019-09-09 | End: 2019-09-09 | Stop reason: HOSPADM

## 2019-09-09 RX ORDER — LORAZEPAM 0.5 MG/1
.5-2 TABLET ORAL EVERY 6 HOURS PRN
Status: DISCONTINUED | OUTPATIENT
Start: 2019-09-09 | End: 2019-09-09 | Stop reason: HOSPADM

## 2019-09-09 RX ORDER — ONDANSETRON 2 MG/ML
INJECTION INTRAMUSCULAR; INTRAVENOUS PRN
Status: DISCONTINUED | OUTPATIENT
Start: 2019-09-09 | End: 2019-09-09

## 2019-09-09 RX ORDER — SULFAMETHOXAZOLE/TRIMETHOPRIM 800-160 MG
1 TABLET ORAL
Qty: 16 TABLET | Refills: 0 | Status: ON HOLD | OUTPATIENT
Start: 2019-09-09 | End: 2019-10-01

## 2019-09-09 RX ORDER — SODIUM CHLORIDE 9 MG/ML
200 INJECTION, SOLUTION INTRAVENOUS CONTINUOUS PRN
Status: DISCONTINUED | OUTPATIENT
Start: 2019-09-09 | End: 2019-09-09 | Stop reason: HOSPADM

## 2019-09-09 RX ORDER — ONDANSETRON 2 MG/ML
8 INJECTION INTRAMUSCULAR; INTRAVENOUS EVERY 6 HOURS
Qty: 480 ML | Refills: 0 | Status: SHIPPED | OUTPATIENT
Start: 2019-09-09 | End: 2019-09-09

## 2019-09-09 RX ORDER — DIPHENHYDRAMINE HCL 25 MG
25-50 CAPSULE ORAL EVERY 6 HOURS PRN
Status: DISCONTINUED | OUTPATIENT
Start: 2019-09-09 | End: 2019-09-09

## 2019-09-09 RX ORDER — ONDANSETRON 2 MG/ML
8 INJECTION INTRAMUSCULAR; INTRAVENOUS EVERY 6 HOURS
Status: DISCONTINUED | OUTPATIENT
Start: 2019-09-09 | End: 2019-09-09

## 2019-09-09 RX ORDER — OXYCODONE HYDROCHLORIDE 5 MG/1
5 TABLET ORAL EVERY 6 HOURS PRN
Qty: 10 TABLET | Refills: 0 | Status: SHIPPED | OUTPATIENT
Start: 2019-09-09 | End: 2019-09-17

## 2019-09-09 RX ORDER — SENNOSIDES 8.6 MG
2 TABLET ORAL 2 TIMES DAILY PRN
Qty: 60 EACH | Refills: 1 | Status: ON HOLD | OUTPATIENT
Start: 2019-09-09 | End: 2019-11-08

## 2019-09-09 RX ORDER — DEXAMETHASONE 6 MG/1
6 TABLET ORAL EVERY 12 HOURS
Qty: 38 TABLET | Refills: 0 | Status: ON HOLD | OUTPATIENT
Start: 2019-09-09 | End: 2019-09-29

## 2019-09-09 RX ORDER — DEXAMETHASONE 2 MG/1
6 TABLET ORAL EVERY 12 HOURS SCHEDULED
Status: DISCONTINUED | OUTPATIENT
Start: 2019-09-09 | End: 2019-09-09 | Stop reason: HOSPADM

## 2019-09-09 RX ORDER — FENTANYL CITRATE 50 UG/ML
25 INJECTION, SOLUTION INTRAMUSCULAR; INTRAVENOUS EVERY 10 MIN PRN
Status: DISCONTINUED | OUTPATIENT
Start: 2019-09-09 | End: 2019-09-09 | Stop reason: HOSPADM

## 2019-09-09 RX ORDER — PANTOPRAZOLE SODIUM 40 MG/1
40 TABLET, DELAYED RELEASE ORAL DAILY
Qty: 20 TABLET | Refills: 0 | Status: ON HOLD | OUTPATIENT
Start: 2019-09-09 | End: 2019-10-01

## 2019-09-09 RX ORDER — ONDANSETRON 2 MG/ML
4 INJECTION INTRAMUSCULAR; INTRAVENOUS EVERY 30 MIN PRN
Status: DISCONTINUED | OUTPATIENT
Start: 2019-09-09 | End: 2019-09-09 | Stop reason: HOSPADM

## 2019-09-09 RX ORDER — ALBUTEROL SULFATE 0.83 MG/ML
2.5 SOLUTION RESPIRATORY (INHALATION)
Status: DISCONTINUED | OUTPATIENT
Start: 2019-09-09 | End: 2019-09-09 | Stop reason: HOSPADM

## 2019-09-09 RX ORDER — DIPHENHYDRAMINE HCL 25 MG
25-50 CAPSULE ORAL EVERY 6 HOURS PRN
Qty: 30 CAPSULE | Refills: 1 | Status: SHIPPED | OUTPATIENT
Start: 2019-09-09 | End: 2019-10-03

## 2019-09-09 RX ORDER — ONDANSETRON 8 MG/1
8 TABLET, FILM COATED ORAL EVERY 6 HOURS PRN
Qty: 30 TABLET | Refills: 1 | Status: SHIPPED | OUTPATIENT
Start: 2019-09-09 | End: 2019-10-03

## 2019-09-09 RX ORDER — METHYLPREDNISOLONE SODIUM SUCCINATE 125 MG/2ML
125 INJECTION, POWDER, LYOPHILIZED, FOR SOLUTION INTRAMUSCULAR; INTRAVENOUS
Status: DISCONTINUED | OUTPATIENT
Start: 2019-09-09 | End: 2019-09-09 | Stop reason: HOSPADM

## 2019-09-09 RX ORDER — DIPHENHYDRAMINE HYDROCHLORIDE 50 MG/ML
25-50 INJECTION INTRAMUSCULAR; INTRAVENOUS EVERY 6 HOURS PRN
Status: DISCONTINUED | OUTPATIENT
Start: 2019-09-09 | End: 2019-09-09 | Stop reason: HOSPADM

## 2019-09-09 RX ORDER — DIPHENHYDRAMINE HYDROCHLORIDE 50 MG/ML
50 INJECTION INTRAMUSCULAR; INTRAVENOUS
Status: DISCONTINUED | OUTPATIENT
Start: 2019-09-09 | End: 2019-09-09 | Stop reason: HOSPADM

## 2019-09-09 RX ORDER — ONDANSETRON 4 MG/1
8 TABLET, FILM COATED ORAL EVERY 6 HOURS
Status: DISCONTINUED | OUTPATIENT
Start: 2019-09-09 | End: 2019-09-09 | Stop reason: HOSPADM

## 2019-09-09 RX ORDER — OXYCODONE HYDROCHLORIDE 5 MG/1
5 TABLET ORAL EVERY 6 HOURS PRN
Qty: 10 TABLET | Refills: 0 | Status: SHIPPED | OUTPATIENT
Start: 2019-09-09 | End: 2019-09-09

## 2019-09-09 RX ORDER — LANOLIN ALCOHOL/MO/W.PET/CERES
3 CREAM (GRAM) TOPICAL AT BEDTIME
Qty: 30 TABLET | Refills: 1 | Status: ON HOLD | OUTPATIENT
Start: 2019-09-09 | End: 2019-10-01

## 2019-09-09 RX ORDER — ONDANSETRON HYDROCHLORIDE 4 MG/5ML
8 SOLUTION ORAL EVERY 6 HOURS
Status: DISCONTINUED | OUTPATIENT
Start: 2019-09-09 | End: 2019-09-09

## 2019-09-09 RX ORDER — POLYETHYLENE GLYCOL 3350 17 G/17G
17 POWDER, FOR SOLUTION ORAL DAILY
Qty: 30 PACKET | Refills: 0 | Status: ON HOLD | OUTPATIENT
Start: 2019-09-09 | End: 2019-11-08

## 2019-09-09 RX ORDER — EPINEPHRINE 1 MG/ML
0.3 INJECTION, SOLUTION, CONCENTRATE INTRAVENOUS EVERY 5 MIN PRN
Status: DISCONTINUED | OUTPATIENT
Start: 2019-09-09 | End: 2019-09-09 | Stop reason: HOSPADM

## 2019-09-09 RX ORDER — ALBUTEROL SULFATE 90 UG/1
1-2 AEROSOL, METERED RESPIRATORY (INHALATION)
Status: DISCONTINUED | OUTPATIENT
Start: 2019-09-09 | End: 2019-09-09 | Stop reason: HOSPADM

## 2019-09-09 RX ADMIN — MIDAZOLAM 1 MG: 1 INJECTION INTRAMUSCULAR; INTRAVENOUS at 12:56

## 2019-09-09 RX ADMIN — MIDAZOLAM 1 MG: 1 INJECTION INTRAMUSCULAR; INTRAVENOUS at 12:47

## 2019-09-09 RX ADMIN — DEXMEDETOMIDINE HYDROCHLORIDE 12 MCG: 100 INJECTION, SOLUTION INTRAVENOUS at 12:50

## 2019-09-09 RX ADMIN — METHOTREXATE: 25 INJECTION, SOLUTION INTRA-ARTERIAL; INTRAMUSCULAR; INTRATHECAL; INTRAVENOUS at 13:07

## 2019-09-09 RX ADMIN — DEXMEDETOMIDINE HYDROCHLORIDE 8 MCG: 100 INJECTION, SOLUTION INTRAVENOUS at 12:55

## 2019-09-09 RX ADMIN — DEXTROSE AND SODIUM CHLORIDE 1000 ML: 5; 900 INJECTION, SOLUTION INTRAVENOUS at 00:15

## 2019-09-09 RX ADMIN — SODIUM CHLORIDE, PRESERVATIVE FREE 2 ML: 5 INJECTION INTRAVENOUS at 10:18

## 2019-09-09 RX ADMIN — VINCRISTINE SULFATE 2 MG: 1 INJECTION, SOLUTION INTRAVENOUS at 16:49

## 2019-09-09 RX ADMIN — Medication 3 ML: at 17:47

## 2019-09-09 RX ADMIN — DIPHENHYDRAMINE HYDROCHLORIDE 25 MG: 25 CAPSULE ORAL at 00:05

## 2019-09-09 RX ADMIN — DEXTROSE AND SODIUM CHLORIDE 1000 ML: 5; 900 INJECTION, SOLUTION INTRAVENOUS at 07:04

## 2019-09-09 RX ADMIN — Medication 2 ML: at 05:50

## 2019-09-09 RX ADMIN — ACETAMINOPHEN 650 MG: 325 TABLET, FILM COATED ORAL at 08:12

## 2019-09-09 RX ADMIN — Medication 3 ML: at 17:48

## 2019-09-09 RX ADMIN — DEXAMETHASONE 6 MG: 2 TABLET ORAL at 15:58

## 2019-09-09 RX ADMIN — ONDANSETRON 4 MG: 2 INJECTION INTRAMUSCULAR; INTRAVENOUS at 13:07

## 2019-09-09 RX ADMIN — ACETAMINOPHEN 650 MG: 325 TABLET, FILM COATED ORAL at 15:58

## 2019-09-09 RX ADMIN — Medication 2 ML: at 10:18

## 2019-09-09 RX ADMIN — SULFAMETHOXAZOLE AND TRIMETHOPRIM 1 TABLET: 800; 160 TABLET ORAL at 08:13

## 2019-09-09 RX ADMIN — FAMOTIDINE 20 MG: 20 TABLET, FILM COATED ORAL at 08:12

## 2019-09-09 RX ADMIN — ONDANSETRON HYDROCHLORIDE 8 MG: 4 TABLET, FILM COATED ORAL at 15:58

## 2019-09-09 RX ADMIN — Medication 150 MG: at 08:12

## 2019-09-09 RX ADMIN — DAUNORUBICIN HYDROCHLORIDE 49 MG: 5 INJECTION, SOLUTION INTRAVENOUS at 17:20

## 2019-09-09 NOTE — PROCEDURES
A Lumbar Puncture was performed in the Operating Room. Informed consent was obtained prior to the procedure. Lazaro Lund was identified by facial recognition and ID arm band. A time-out was performed, including verification of methotrexate dose, name, and expiration date. Lazaro Lund was then placed in the left lateral decubitus position and the lumbosacral area was sterily prepped using Chloraprep followed by drape placement. Anatomic landmarks were identified by palpation. Then, a 22 gauge, 3.5 inch spinal needle was easily inserted into the L4-L5 interspace. On the first attempt approximately 4 mL of clear and colorless cerebrospinal fluid was obtained to be sent to the lab for cell count analysis and cytospin. Following that, 15mg of intrathecal methotrexate in 6 mL of preservative-free normal saline was infused without resistance. The needle was removed and a Band-Aid applied. Lazaro Lund tolerated this procedure very well.      Signed,  Nicho Fischer   Pediatric Hematology/Oncology Fellow    Physician Attestation   I spent a total of 25 minutes with the patient, personally observing as the resident/fellow performed lumbar puncture and No results found for: T4. Chemotherapy..     Key findings: No complications. Adequate CSF sample obtained.    Alexi Hayes  Date of Service (when I saw the patient): 09/09/19

## 2019-09-09 NOTE — PROGRESS NOTES
Today I met with Lazaro's Mom & we reviewed the chemotherapy treatment plan for [id number] and the [facility]. We discussed the chemotherapy medications and their side effects including lowering blood counts, risk for infection, nausea, vomiting, constipation, fatigue, hair loss. We also discussed the types of blood cells including white blood cells, red blood cells, platelets, their function, & transfusion parameters. We discussed signs of infection including fever of >100.5, redness, tenderness, & drainage at port site, diarrhea. We reviewed the phone numbers to call & who to talk to for fever, increased nausea & vomiting, dehydration, constipation, diarrhea, any change in mental status or overall change in status. We discussed when he has his Port that he can swim in chlorinated pools but no fresh water (lakes, rivers) or hot tubs but not with his PICC line. We also discussed infection prevention with good handwashing, avoiding crowds when ANC <500, avoiding construction sites, good oral care but to avoid dental procedures during chemotherapy treatment, if possible. We also discussed use of sunscreen, no immunizations except the flu shot and avoiding use of Ibuprofen/Advil or Aspirin products. He can take Tylenol but to check his temperature first as it can mask a fever. We also discussed the discharge medications and outpatient follow up. His parents verbalized understanding of the information & their questions were answered. We will continue to review the information.

## 2019-09-09 NOTE — OR NURSING
PACU to Inpatient Nursing Handoff    Patient Lazaro Lund is a 19 year old male who speaks English.   Procedure Procedure(s):  Lumbar Puncture With Intrathecal Chemo   Surgeon(s) Primary: Alexi Hayes MD     Allergies   Allergen Reactions     No Known Drug Allergies        Isolation  [unfilled]     Past Medical History   has a past medical history of Migraine (2006).    Anesthesia General   Dermatome Level     Preop Meds Not applicable   Nerve block Not applicable   Intraop Meds dexmedetomidine (Precedex): 20 mcg total  ondansetron (Zofran): last given at 4  versed 2 mg   Local Meds No   Antibiotics Not applicable     Pain Patient Currently in Pain: sleeping: patient not able to self report   PACU meds  Not applicable   PCA / epidural No   Capnography     Telemetry ECG Rhythm: Sinus bradycardia   Inpatient Telemetry Monitor Ordered? No        Labs Glucose Lab Results   Component Value Date     09/09/2019       Hgb Lab Results   Component Value Date    HGB 12.9 09/08/2019       INR Lab Results   Component Value Date    INR 1.20 09/04/2019      PACU Imaging Not applicable     Wound/Incision Wound Left;Lower Surgical Surgical Wound from chest tube removal (Active)   Site Assessment [facility] 9/8/2019  8:00 PM   Nani-wound Assessment [facility] 9/8/2019  8:00 PM   Drainage Amount [facility] 9/8/2019  8:00 PM   Drainage Color/Charcteristics [facility] 9/8/2019  8:00 PM   Dressing Transparent film (Opsite, Tegaderm) 9/8/2019  9:02 AM   Dressing Status Clean, dry, intact 9/8/2019  8:00 PM   Dressing Change Due 09/06/19 9/7/2019  8:00 AM   Number of days:        Incision/Surgical Site 08/31/19 Left Chest (Active)   Incision Assessment WDL 9/9/2019  1:29 PM   Closure CONRAD 9/8/2019  9:02 AM   Incision Drainage Amount [facility] 9/8/2019  9:02 AM   Drainage Description [facility] 9/8/2019  9:02 AM   Incision Care Wound cleanser 9/3/2019  4:00 PM   Dressing Intervention Clean, dry, intact 9/8/2019  8:00 PM   Number of days: 9        Incision/Surgical Site 09/01/19 Left Back (Active)   Incision Assessment [facility] 9/9/2019  1:29 PM   Incision Drainage Amount None 9/9/2019  9:00 AM   Dressing Intervention Open to air / No Dressing 9/9/2019  9:00 AM   Number of days: 8       Incision/Surgical Site 09/09/19 Back (Active)   Incision Assessment [facility] 9/9/2019  1:29 PM   Incision Drainage Amount None 9/9/2019  1:29 PM   Dressing Intervention Clean, dry, intact 9/9/2019  1:29 PM   Number of days: 0      CMS        Equipment Not applicable   Other LDA       IV Access PICC Double Lumen 08/31/19 Right Basilic (Active)   Site Assessment [facility] 9/9/2019  1:29 PM   External Cath Length (cm) 0 cm 8/30/2019 12:00 AM   Extremity Circumference (cm) 27 cm 8/30/2019 12:00 AM   Dressing Intervention New dressing;Transparent;Chlorhexidine patch 9/6/2019 12:00 PM   Dressing Change Due 09/13/19 9/9/2019  5:50 AM   Lumen A - Color RED 9/9/2019  1:29 PM   Lumen A - Status heparin locked 9/9/2019  1:29 PM   Lumen A - Cap Change Due 09/10/19 9/9/2019  5:50 AM   Lumen B - Color WHITE 9/9/2019  1:29 PM   Lumen B - Status infusing 9/9/2019  1:29 PM   Lumen B - Cap Change Due 09/10/19 9/9/2019  5:50 AM   Extravasation? No 9/9/2019 10:00 AM   Line Necessity Yes, meets criteria 9/9/2019 10:00 AM   Number of days: 9      Blood Products Not applicable EBL 0   mL   Intake/Output Date 09/09/19 0700 - 09/10/19 0659   Shift 5263-5354 6819-0841 8612-9743 24 Hour Total   INTAKE   I.V. 452.5   452.5   Shift Total(mL/kg) 452.5(6)   452.5(6)   OUTPUT   Shift Total(mL/kg)       Weight (kg) 75.4 75.4 75.4 75.4      Drains / Lara     Time of void PreOp Void Prior to Procedure: 1500 (08/31/19 1610)    PostOp Voided (mL): 425 mL (09/09/19 0604)  Urine Occurrence: 1 (08/30/19 6669)    Diapered? No   Bladder Scan      mL (09/09/19 0200)  none     Vitals    B/P: 104/57  T: 97.9  F (36.6  C)    Temp src: Axillary  P:  Pulse: 50 (09/09/19 1415)    Heart Rate: (!) 48 (09/09/19 1415)     R: 16  O2:   SpO2: 99 %    O2 Device: Nasal cannula (09/09/19 1415)    Oxygen Delivery: 2 LPM (09/09/19 1415)    FiO2 (%): 21 % (09/03/19 0800)    Family/support present none   Patient belongings     Patient transported on cart   DC meds/scripts (obs/outpt) Not applicable   Inpatient Pain Meds Released? Yes       Special needs/considerations None   Tasks needing completion None       Fay Bean RN  ASCOM [id number]

## 2019-09-09 NOTE — ANESTHESIA POSTPROCEDURE EVALUATION
Anesthesia POST Procedure Evaluation    Patient: Lazaro Lund   MRN:     [id number] Gender:   male   Age:    19 year old :      2000        Preoperative Diagnosis: Lymphoma   Procedure(s):  Lumbar Puncture With Intrathecal Chemo   Postop Comments: No value filed.       Anesthesia Type:  Not documented  MAC    Reportable Event: NO     PAIN: Uncomplicated   Sign Out status: Comfortable, Well controlled pain     PONV: No PONV   Sign Out status:  No Nausea or Vomiting     Neuro/Psych: Uneventful perioperative course   Sign Out Status: Preoperative baseline; Age appropriate mentation     Airway/Resp.: Uneventful perioperative course   Sign Out Status: Non labored breathing, age appropriate RR; Resp. Status within EXPECTED Parameters     CV: Uneventful perioperative course   Sign Out status: Appropriate BP and perfusion indices; Appropriate HR/Rhythm     Disposition:   Sign Out in:  PACU  Disposition:  Floor  Recovery Course: Uneventful  Follow-Up: Not required     Comments/Narrative:  Patient comfortable. Reports he doesn't remember the procedure, and he doesn't have any pain or confusion now. Denies questions regarding anesthesia.            Last Anesthesia Record Vitals:  CRNA VITALS  2019 1247 - 2019 1347      2019             NIBP:  106/58    Pulse:  50    NIBP Mean:  75    Temp:  36.7  C (98.1  F)    SpO2:  99 %    Resp Rate (observed):  18          Last PACU Vitals:  Vitals Value Taken Time   /61 2019  2:31 PM   Temp     Pulse 49 2019  2:31 PM   Resp 29 2019  2:27 PM   SpO2 99 % 2019  2:41 PM   Temp src     NIBP     Pulse     SpO2     Resp     Temp     Ht Rate     Temp 2     Vitals shown include unvalidated device data.      Electronically Signed By: Stella Montemayor MD, 2019, 2:57 PM   [id number]

## 2019-09-09 NOTE — ANESTHESIA PREPROCEDURE EVALUATION
Anesthesia Pre-Procedure Evaluation    Patient: Lazaro Lund   MRN:     [id number] Gender:   male   Age:    19 year old :      2000        Preoperative Diagnosis: Lymphoma   Procedure(s):  Lumbar Puncture With Itrathecal Chemo     Past Medical History:   Diagnosis Date     Migraine 2006    have resolved      Past Surgical History:   Procedure Laterality Date     BONE MARROW BIOPSY, BONE SPECIMEN, NEEDLE/TROCAR Left 2019    Procedure: BIOPSY, BONE MARROW;  Surgeon: Heather Lopez MD;  Location: [facility] OR     INSERT PICC LINE N/A 2019    Procedure: INSERTION, PICC;  Surgeon: Michell Keith MD;  Location: [facility] OR     IR PICC PLACEMENT > 5 YRS OF AGE  2019     SPINAL PUNCTURE,LUMBAR, INTRATHECAL CHEMO DELIVERY N/A 2019    Procedure: LUMBAR PUNCTURE, WITH INTRATHECAL CHEMOTHERAPY ADMINISTRATION;  Surgeon: Heather Lopez MD;  Location: [facility] OR     THORACENTESIS N/A 2019    Procedure: Thoracentesis;  Surgeon: Michell Keith MD;  Location: [facility] OR          Anesthesia Evaluation     . Pt has had prior anesthetic. Type: MAC    No history of anesthetic complications          ROS/MED HX    ENT/Pulmonary: Comment: SOB. Worst with the recumbent position, had chest tube placed with removal of fluid and improvement in breathing. States his breathing has improved, laid flat yesterday for several minutes and it felt normal. Possibly R side better than L      Neurologic:  - neg neurologic ROS     Cardiovascular: Comment: Anterior mediastinal mass encasing pulmonary arteries bilaterally but not obstruction to flow on TTE.    19 echo shows less obstruction than prior:  Normal intracardiac connections. The left and right ventricles have normal  chamber size, wall thickness, and systolic function. There is a large anterior  mediastinal mass, previously causing compression of the left atrium, right and  left pulmonary arteries, and SVC. There is mild compression and flow  acceleration through  the mid LA. Unobstructed flow in the branch pulmonary  arteries and SVC. No pericardial effusion.        METS/Exercise Tolerance:     Hematologic:  - neg hematologic  ROS       Musculoskeletal:  - neg musculoskeletal ROS       GI/Hepatic:  - neg GI/hepatic ROS       Renal/Genitourinary:  - ROS Renal section negative       Endo:  - neg endo ROS       Psychiatric:         Infectious Disease:         Malignancy:   (+) Malignancy History of Lymphoma/Leukemia  Lymph CA Active status post Chemo, Upper anterior mediastinal mass. Cardiac tamponade physiology released by pleural effusion and pericardiocentesis procedure. Mass shrinking from chemo.         Other:                         PHYSICAL EXAM:   Mental Status/Neuro: A/A/O   Airway: Facies: Feasible  Mallampati: II  Mouth/Opening: Full  TM distance: > 6 cm  Neck ROM: Full   Respiratory: Auscultation: CTAB     Resp. Rate: Normal     Resp. Effort: Normal      CV: Rhythm: Regular  Rate: Age appropriate  Heart: Normal Sounds  Edema: None   Comments:      Dental: Details                  LABS:  CBC:   Lab Results   Component Value Date    WBC 8.1 09/08/2019    WBC 8.2 09/06/2019    HGB 12.9 (L) 09/08/2019    HGB 12.5 (L) 09/06/2019    HCT 39.9 (L) 09/08/2019    HCT 39.1 (L) 09/06/2019     09/08/2019     09/06/2019     BMP:   Lab Results   Component Value Date     09/09/2019     09/08/2019    POTASSIUM 4.2 09/09/2019    POTASSIUM 4.6 09/08/2019    CHLORIDE 105 09/09/2019    CHLORIDE 105 09/08/2019    CO2 27 09/09/2019    CO2 28 09/08/2019    BUN 22 09/09/2019    BUN 18 09/08/2019    CR 0.51 (L) 09/09/2019    CR 0.55 (L) 09/08/2019     (H) 09/09/2019    GLC 88 09/08/2019     COAGS:   Lab Results   Component Value Date    PTT 29 09/04/2019    INR 1.20 (H) 09/04/2019    FIBR 292 09/04/2019     POC:   Lab Results   Component Value Date    BGM 80 08/31/2019     OTHER:   Lab Results   Component Value Date    MICHAEL 7.5 (L) 09/09/2019    PHOS 3.4  "09/09/2019    MAG 2.0 09/09/2019    ALBUMIN 2.3 (L) 09/05/2019    PROTTOTAL 6.2 (L) 09/01/2019    ALT 23 09/01/2019    AST 8 09/01/2019    ALKPHOS 74 09/01/2019    BILITOTAL 0.3 09/01/2019        Preop Vitals    BP Readings from Last 3 Encounters:   09/09/19 104/55   08/22/13 100/66 (9 %/ 47 %)*   08/19/10 (!) 88/56 (3 %/ 29 %)*     *BP percentiles are based on the August 2017 AAP Clinical Practice Guideline for boys    Pulse Readings from Last 3 Encounters:   09/09/19 55   08/22/13 93   08/19/10 56      Resp Readings from Last 3 Encounters:   09/09/19 16   08/22/13 20   08/19/10 24    SpO2 Readings from Last 3 Encounters:   09/09/19 99%   08/22/13 98%      Temp Readings from Last 1 Encounters:   09/09/19 36.9  C (98.5  F) (Oral)    Ht Readings from Last 1 Encounters:   08/30/19 1.825 m (5' 11.85\")      Wt Readings from Last 1 Encounters:   09/08/19 75.4 kg (166 lb 3.6 oz)    Estimated body mass index is 22.64 kg/m  as calculated from the following:    Height as of this encounter: 1.825 m (5' 11.85\").    Weight as of this encounter: 75.4 kg (166 lb 3.6 oz).     LDA:  PICC Double Lumen 08/31/19 Right Basilic (Active)   Site Assessment WDL 9/9/2019  5:50 AM   External Cath Length (cm) 0 cm 8/30/2019 12:00 AM   Extremity Circumference (cm) 27 cm 8/30/2019 12:00 AM   Dressing Intervention New dressing;Transparent;Chlorhexidine patch 9/6/2019 12:00 PM   Dressing Change Due 09/13/19 9/9/2019  5:50 AM   Lumen A - Color RED 9/9/2019  5:50 AM   Lumen A - Status blood return noted;heparin locked 9/9/2019  5:50 AM   Lumen A - Cap Change Due 09/10/19 9/9/2019  5:50 AM   Lumen B - Color WHITE 9/9/2019  5:50 AM   Lumen B - Status infusing 9/9/2019  5:50 AM   Lumen B - Cap Change Due 09/10/19 9/9/2019  5:50 AM   Extravasation? No 9/9/2019  5:50 AM   Line Necessity Yes, meets criteria 9/9/2019  5:50 AM   Number of days: 9        Assessment:   ASA SCORE: 3    H&P: History and physical reviewed and following examination; no interval " change.   Smoking Status:  Non-Smoker/Unknown   NPO Status: NPO Appropriate     Plan:   Anes. Type:  MAC   Pre-Medication: Midazolam; Dexmedetomidine   Induction:  IV (Standard)   Airway: Native Airway   Access/Monitoring: PIV   Maintenance: N/a (ketamine, precedex)     Postop Plan:   Postop Pain: None  Postop Sedation/Airway: Not planned  Disposition: Inpatient/Admit     PONV Management:   Adult Risk Factors:, Non-Smoker   Prevention: Ondansetron     CONSENT: Direct conversation   Plan and risks discussed with: Patient   Blood Products: Consent Deferred (Minimal Blood Loss)       Comments for Plan/Consent:  Discussed risks of anesthesia including nausea, vomiting, sore throat, dental damage, cardiopulmonary complications, neurologic complications, and serious complications.                   Stella Montemayor MD

## 2019-09-09 NOTE — DISCHARGE INSTRUCTIONS
For temperature >100.5, increased nausea, vomiting, pain or any other concerns, please call [phone] & ask to talk to the Pediatric Oncology Fellow On Call.    Thursday, September 12 @ 10 AM - [facility] for labs, exam & possible transfusion.  Tuesday, September 17 @ 11:30 AM - [facility] for labs, exam & chemo.  Thursday, September 19 @ 11:30 AM - [facility] for labs & exam.  Tuesday, September 24 @ 11:30 AM - [facility] for labs, exam & chemo.  Thursday, September 26 @ 12 Noon - [facility] for labs & exam.    Tuesday, October 1  -  You will be called with pre-procedure instructions.  -  Arrive in Peds Sedation @ 11 AM, LP/Marrow/Port placement scheduled for 12 noon.

## 2019-09-09 NOTE — PROVIDER NOTIFICATION
Paged MD to discuss pt's continuous fluids for overnight. MD stated to start the fluids back up at 150mL/hr to run throughout the night shift. Will continue to monitor.

## 2019-09-09 NOTE — PLAN OF CARE
Patient showered this morning and went down for LP/IT chemo. Awaiting return. Will get chemo this evening and discharge after. Continue to monitor.

## 2019-09-09 NOTE — PROGRESS NOTES
Care Coordinator Progress Note    Admission Date/Time:  8/30/2019  Attending MD:  Heather Lopez MD    Data  Chart reviewed, discussed with interdisciplinary team.   Patient was admitted for:    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)  Lymphoma, unspecified body region, unspecified lymphoma type (H)  T lymphoblastic lymphoma (H).    Concerns with insurance coverage for discharge needs: insurance coverage is inadequate.  Current Living Situation: Patient lives with family.  Support System: Supportive and Involved  Transportation at Discharge: Family or friend will provide  Transportation to Medical Appointments:   - Name of caregiver: Mother    Coordination of Care and Referrals: Provided patient/family with options for Home Infusion.        Assessment  Lazaro agreeable to referral to [facility]. [facility] benefit check returned that patient's primary insurance does not have any benefit for PICC line flushing supplies. Spoke with [employer] Medicaid application pending. Explained lack of coverage and using Medicaid as secondary.  He expressed understanding.     Updated FHI of above. Ready for discharge.      Plan  Anticipated Discharge Date:  09/09/19    Anticipated Discharge Plan:  Home with PICC flushing supplies through [facility].     Amy Winston RN

## 2019-09-09 NOTE — PROGRESS NOTES
Therapy:[facility]  Insurance:[employer]    Home Infusion is not a covered benefit on this insurance regardless of therapy. Patient would have to Self Pay.    Please contact [facility] for an estimate of any pocket costs.     Please contact Intake with any questions, [phone] or In Basket pool, [facility] ([id number]).  [facility]-In reference to admission on 08/30/2019 to check on [facility] coverage

## 2019-09-09 NOTE — PLAN OF CARE
[id number] VSS. Denies pain. No s/s nausea or vomiting. Slept throughout the night. Requested Benadryl, contacted MD and pt was given PRN Benadryl for sleep. Declined to be awoken for scheduled Tylenol at 02:00. PICC infusing continuous fluids ran throughout the night (white lumen). Red lumen heparin locked. Pt has had great UOP. Small BM on the andreea shift. Lung sounds clear. Intermittent cough at times. No coughing in the night. The site where the left chest tube was placed is healed. NPO starting at 00:00 for IT chemo scheduled. Hourly rounding completed. Will continue to monitor and notify MD of any changes.

## 2019-09-09 NOTE — ANESTHESIA CARE TRANSFER NOTE
Patient: Lazaro Lund    Procedure(s):  Lumbar Puncture With Intrathecal Chemo    Diagnosis: Lymphoma  Diagnosis Additional Information: No value filed.    Anesthesia Type:   MAC     Note:  Airway :Nasal Cannula  Patient transferred to:Phase II  Comments: Patient remains calm, left side lying, arousable to name. VSS, normothermic. PICC infusing patent. Report to RN.Handoff Report: Identifed the Patient, Identified the Reponsible Provider, Reviewed the pertinent medical history, Discussed the surgical course, Reviewed Intra-OP anesthesia mangement and issues during anesthesia, Set expectations for post-procedure period and Allowed opportunity for questions and acknowledgement of understanding      Vitals: (Last set prior to Anesthesia Care Transfer)    CRNA VITALS  9/9/2019 1247 - 9/9/2019 1331      9/9/2019             NIBP:  106/58    Pulse:  50    NIBP Mean:  75    Temp:  36.7  C (98.1  F)    SpO2:  99 %    Resp Rate (observed):  18                Electronically Signed By: YOHAN Melendrez CRNA  September 9, 2019  1:31 PM

## 2019-09-09 NOTE — PLAN OF CARE
AVSS. LS clear on RA. No c/o pain. No n/v. No PO intake. Good UOP; no stool. Pt received vincristine and donurubicin at the 1600. Pt tolerated the infusions with no issues. PICC heparin locked post chemo; blood return noted. Discharge orders placed. AVS printed. One copy given to patient to take home along with discharge meds. AVS and take home meds discussed with pt and family at bedside. Pt verbalized readiness to go home x2. Pt left for home around 1836. Hourly rounding completed.

## 2019-09-10 ENCOUNTER — HOME INFUSION (PRE-WILLOW HOME INFUSION) (OUTPATIENT)
Dept: PHARMACY | Facility: CLINIC | Age: 19
End: 2019-09-10

## 2019-09-10 LAB
APPEARANCE CSF: CLEAR
COLOR CSF: COLORLESS
COPATH REPORT: NORMAL
COPATH REPORT: NORMAL
RBC # CSF MANUAL: 0 /UL (ref 0–2)
TUBE # CSF: NORMAL #
WBC # CSF MANUAL: 1 /UL (ref 0–5)

## 2019-09-10 NOTE — PLAN OF CARE
Physical Therapy Discharge Summary    Reason for therapy discharge:    Discharged to home.    Progress towards therapy goal(s). See goals on Care Plan in [facility] electronic health record for goal details.  Goals met    Therapy recommendation(s):    Continue home exercise program.

## 2019-09-11 LAB — COPATH REPORT: NORMAL

## 2019-09-11 NOTE — PROGRESS NOTES
This is a recent snapshot of the patient's [facility] medical record.  For current drug dose and complete information and questions, call [phone] or In Basket pool, [facility] ([id number])  CSN Number:  [id number]

## 2019-09-12 ENCOUNTER — OFFICE VISIT (OUTPATIENT)
Dept: PEDIATRIC HEMATOLOGY/ONCOLOGY | Facility: CLINIC | Age: 19
End: 2019-09-12

## 2019-09-12 ENCOUNTER — HOSPITAL ENCOUNTER (OUTPATIENT)
Dept: CT IMAGING | Facility: CLINIC | Age: 19
Discharge: HOME | End: 2019-09-12
Attending: NURSE PRACTITIONER | Admitting: NURSE PRACTITIONER
Payer: COMMERCIAL

## 2019-09-12 ENCOUNTER — INFUSION THERAPY VISIT (OUTPATIENT)
Dept: INFUSION THERAPY | Facility: CLINIC | Age: 19
End: 2019-09-12
Attending: NURSE PRACTITIONER
Payer: COMMERCIAL

## 2019-09-12 ENCOUNTER — OFFICE VISIT (OUTPATIENT)
Dept: PEDIATRIC HEMATOLOGY/ONCOLOGY | Facility: CLINIC | Age: 19
End: 2019-09-12
Attending: NURSE PRACTITIONER
Payer: COMMERCIAL

## 2019-09-12 VITALS
OXYGEN SATURATION: 100 % | RESPIRATION RATE: 18 BRPM | TEMPERATURE: 98.4 F | DIASTOLIC BLOOD PRESSURE: 71 MMHG | SYSTOLIC BLOOD PRESSURE: 117 MMHG | HEART RATE: 62 BPM

## 2019-09-12 DIAGNOSIS — Z71.9 ENCOUNTER FOR COUNSELING: Primary | ICD-10-CM

## 2019-09-12 DIAGNOSIS — C83.50 T LYMPHOBLASTIC LYMPHOMA (H): Primary | ICD-10-CM

## 2019-09-12 DIAGNOSIS — C83.50 T LYMPHOBLASTIC LYMPHOMA (H): ICD-10-CM

## 2019-09-12 LAB
ABO + RH BLD: NORMAL
ABO + RH BLD: NORMAL
BASOPHILS # BLD AUTO: 0 10E9/L (ref 0–0.2)
BASOPHILS NFR BLD AUTO: 0 %
BLD GP AB SCN SERPL QL: NORMAL
BLOOD BANK CMNT PATIENT-IMP: NORMAL
DIFFERENTIAL METHOD BLD: ABNORMAL
EOSINOPHIL # BLD AUTO: 0 10E9/L (ref 0–0.7)
EOSINOPHIL NFR BLD AUTO: 0 %
ERYTHROCYTE [DISTWIDTH] IN BLOOD BY AUTOMATED COUNT: 12.1 % (ref 10–15)
HCT VFR BLD AUTO: 45.4 % (ref 40–53)
HGB BLD-MCNC: 14.7 G/DL (ref 13.3–17.7)
IMM GRANULOCYTES # BLD: 0 10E9/L (ref 0–0.4)
IMM GRANULOCYTES NFR BLD: 0.4 %
INTERPRETATION ECG - MUSE: NORMAL
LYMPHOCYTES # BLD AUTO: 0.6 10E9/L (ref 0.8–5.3)
LYMPHOCYTES NFR BLD AUTO: 13.2 %
MCH RBC QN AUTO: 26.3 PG (ref 26.5–33)
MCHC RBC AUTO-ENTMCNC: 32.4 G/DL (ref 31.5–36.5)
MCV RBC AUTO: 81 FL (ref 78–100)
MONOCYTES # BLD AUTO: 0.1 10E9/L (ref 0–1.3)
MONOCYTES NFR BLD AUTO: 1.5 %
NEUTROPHILS # BLD AUTO: 3.9 10E9/L (ref 1.6–8.3)
NEUTROPHILS NFR BLD AUTO: 84.9 %
NRBC # BLD AUTO: 0 10*3/UL
NRBC BLD AUTO-RTO: 0 /100
PLATELET # BLD AUTO: 163 10E9/L (ref 150–450)
RBC # BLD AUTO: 5.58 10E12/L (ref 4.4–5.9)
SPECIMEN EXP DATE BLD: NORMAL
WBC # BLD AUTO: 4.6 10E9/L (ref 4–11)

## 2019-09-12 PROCEDURE — G0463 HOSPITAL OUTPT CLINIC VISIT: HCPCS | Mod: 25

## 2019-09-12 PROCEDURE — 25800030 ZZH RX IP 258 OP 636: Mod: ZF | Performed by: NURSE PRACTITIONER

## 2019-09-12 PROCEDURE — 96365 THER/PROPH/DIAG IV INF INIT: CPT | Mod: 59

## 2019-09-12 PROCEDURE — 86901 BLOOD TYPING SEROLOGIC RH(D): CPT | Performed by: NURSE PRACTITIONER

## 2019-09-12 PROCEDURE — 70450 CT HEAD/BRAIN W/O DYE: CPT

## 2019-09-12 PROCEDURE — 86850 RBC ANTIBODY SCREEN: CPT | Performed by: NURSE PRACTITIONER

## 2019-09-12 PROCEDURE — 25000132 ZZH RX MED GY IP 250 OP 250 PS 637: Mod: ZF

## 2019-09-12 PROCEDURE — 25000128 H RX IP 250 OP 636: Mod: ZF

## 2019-09-12 PROCEDURE — 36415 COLL VENOUS BLD VENIPUNCTURE: CPT | Performed by: NURSE PRACTITIONER

## 2019-09-12 PROCEDURE — 25800030 ZZH RX IP 258 OP 636: Mod: ZF

## 2019-09-12 PROCEDURE — 86900 BLOOD TYPING SEROLOGIC ABO: CPT | Performed by: NURSE PRACTITIONER

## 2019-09-12 PROCEDURE — 96375 TX/PRO/DX INJ NEW DRUG ADDON: CPT

## 2019-09-12 PROCEDURE — 85025 COMPLETE CBC W/AUTO DIFF WBC: CPT | Performed by: NURSE PRACTITIONER

## 2019-09-12 PROCEDURE — 25000125 ZZHC RX 250: Mod: ZF | Performed by: NURSE PRACTITIONER

## 2019-09-12 RX ORDER — HEPARIN SODIUM,PORCINE 10 UNIT/ML
5-10 VIAL (ML) INTRAVENOUS EVERY 24 HOURS
Status: DISCONTINUED | OUTPATIENT
Start: 2019-09-12 | End: 2019-09-12 | Stop reason: HOSPADM

## 2019-09-12 RX ORDER — HEPARIN SODIUM,PORCINE 10 UNIT/ML
VIAL (ML) INTRAVENOUS
Status: COMPLETED
Start: 2019-09-12 | End: 2019-09-12

## 2019-09-12 RX ORDER — DIPHENHYDRAMINE HYDROCHLORIDE 50 MG/ML
INJECTION INTRAMUSCULAR; INTRAVENOUS
Status: COMPLETED
Start: 2019-09-12 | End: 2019-09-12

## 2019-09-12 RX ORDER — SODIUM CHLORIDE 9 MG/ML
INJECTION, SOLUTION INTRAVENOUS
Status: COMPLETED
Start: 2019-09-12 | End: 2019-09-12

## 2019-09-12 RX ORDER — DIPHENHYDRAMINE HYDROCHLORIDE 50 MG/ML
25-50 INJECTION INTRAMUSCULAR; INTRAVENOUS ONCE
Status: COMPLETED | OUTPATIENT
Start: 2019-09-12 | End: 2019-09-12

## 2019-09-12 RX ORDER — HEPARIN SODIUM,PORCINE 10 UNIT/ML
VIAL (ML) INTRAVENOUS
Status: DISCONTINUED
Start: 2019-09-12 | End: 2019-09-12 | Stop reason: HOSPADM

## 2019-09-12 RX ORDER — ONDANSETRON 2 MG/ML
INJECTION INTRAMUSCULAR; INTRAVENOUS
Status: DISCONTINUED
Start: 2019-09-12 | End: 2019-09-12 | Stop reason: WASHOUT

## 2019-09-12 RX ORDER — ONDANSETRON 2 MG/ML
8 INJECTION INTRAMUSCULAR; INTRAVENOUS ONCE
Status: DISCONTINUED | OUTPATIENT
Start: 2019-09-12 | End: 2019-09-12 | Stop reason: HOSPADM

## 2019-09-12 RX ORDER — ACETAMINOPHEN 325 MG/1
650 TABLET ORAL ONCE
Status: COMPLETED | OUTPATIENT
Start: 2019-09-12 | End: 2019-09-12

## 2019-09-12 RX ORDER — ACETAMINOPHEN 325 MG/1
TABLET ORAL
Status: COMPLETED
Start: 2019-09-12 | End: 2019-09-12

## 2019-09-12 RX ADMIN — ACETAMINOPHEN 650 MG: 325 TABLET ORAL at 12:21

## 2019-09-12 RX ADMIN — DIPHENHYDRAMINE HYDROCHLORIDE 25 MG: 50 INJECTION INTRAMUSCULAR; INTRAVENOUS at 12:05

## 2019-09-12 RX ADMIN — SODIUM CHLORIDE 500 ML: 9 INJECTION, SOLUTION INTRAVENOUS at 10:55

## 2019-09-12 RX ADMIN — CAFFEINE AND SODIUM BENZOATE 500 MG: 125 INJECTION, SOLUTION INTRAMUSCULAR; INTRAVENOUS at 12:27

## 2019-09-12 RX ADMIN — Medication 500 ML: at 10:55

## 2019-09-12 RX ADMIN — Medication 50 UNITS: at 13:46

## 2019-09-12 RX ADMIN — DIPHENHYDRAMINE HYDROCHLORIDE 25 MG: 50 INJECTION, SOLUTION INTRAMUSCULAR; INTRAVENOUS at 12:05

## 2019-09-12 NOTE — LETTER
9/12/2019      RE: Lazaro Lund  [address] [facility] [address]       [facility]  PEDIATRIC HEMATOLOGY/ONCOLOGY   SOCIAL WORK PROGRESS NOTE      DATA:     Lazaro is a 19 year old young man with newly diagnosed T-cell lymphoblastic lymphoma. Lazaro presented with acute onset cough and was found to have an anterior mediastinal mass and malignant left-sided pleural effusion. He presents to clinic infusion today, unaccompanied, for day 11 of induction therapy per [id number]. SW met briefly with Lazaro to introduce self, role of H/O SW, provide contact information, offer support and build rapport. Our visit was brief as Lazaro was not feeling well today.     Lazaro is currently residing with his Dad, Jean-Pierre and step-siblings in [place]. His parents are . Mom, Carmen lives in [place]. He has a good relationship with both parents. He is thinking about going to live with his mother as he goes through treatment as his father has young kids at home. He has not yet made a final decision surrounding this. He is planning on quitting his job at a [employer] in [place] and has started an application for Medical Assistance (MA) on the [employer] website. He applied for Social Security and has an in person interview in early October. He has a monthly parking pass. We will discuss mileage and parking reimbursement once he is approved for MA and is feeling a bit better. SW will plan on introducing Health Care Directive and providing education at upcoming visits. Full psychosocial assessment to follow.     INTERVENTION:     1. Introduction of Hem/Onc SW, role, and contact information provided.   2. Supportive check-in.   3. Establishment of rapport and trust.     ASSESSMENT:     Lazaro shared having a pretty awful headache. He has started feeling nauseous. He will be meeting with Luana AU to discuss management of these side effects. These are new for him and are hitting him  quite hard. He appears well supported by both parents. He appeared open to ongoing SW support. He appears to be coping with his new diagnosis appropriately at present. SW will continue to assess coping, offer support, connection with resources, etc.     PLAN:     Social work will continue to assess needs and provide ongoing psychosocial support and access to resources.      CHEY Davis, LICSW, OSW-C  Clinical    Pediatric Hematology Oncology   [facility]   Monday-Thursday   Phone: [phone]  Pager: [phone]    NO LETTER                CHEY Alcantara

## 2019-09-12 NOTE — PROGRESS NOTES
Lazaro came to clinic today to possibly receive a blood product transfusion due to lymphoma. Patient denies any fevers and/or infections but noted nausea and a severe headache. Labs drawn as ordered per lab. Per ordered parameters no transfusion needed. IV fluids, tylenol, IV benadryl and IV caffeine administered per orders. IV benadryl benadryl administered over 15 minutes. Post administration of medications patient noted relief of nausea and his headache was rated 1/10. PICC dressing change completed along with cap change. Both lumens heparin locked. Patient seen by provider Luana Van while in clinic. Red lumen was sluggish upon completion of cares, Luana notified and ok to assess at next appointment. Patient left with mother in stable condition at approximately 1400.

## 2019-09-12 NOTE — LETTER
Date:September 13, 2019      Provider requested that no letter be sent. Do not send.       [facility] Health Information

## 2019-09-12 NOTE — LETTER
9/12/2019      RE: Lazaro Lund  [id number] [facility] [id number]       Lazaro Lund is a 19 year old young man with newly diagnosed T-cell lymphoblastic lymphoma. Lazaro presented with acute onset cough and was found to have an anterior mediastinal mass and malignant left-sided pleural effusion.  A CT guided biopsy was obtained at [facility] and pathology was consistent with T-cell leukemia vs lymphoma.  He was admitted to [facility] oncology service 8/30  and started on treatment per [id number].  He had a pleural effusion that required a chest tube and a pericardial effusion that was not drained. His Induction was complicated by cardiac compression secondary to his mass leading to tamponade, this improved through out his hospital stay.  He also had dysphagia that improved with treatment of his mass, swallow study was normal.    Lazaro comes to clinic today for his first outpatient visit, he drove himself and is initially alone for the appt.  He is Day 11 of Induction therapy.  Lazaro notes he's had a headache since Monday, this started shortly after his LP.  It is positional in nature and gets much worse when he sits up.  He notes the headache seems to be getting worse and upon driving here today he became very dizzy and felt out of it.  After lying down the dizziness is improving.  He notes pain in the occipital areas, denies changes in vision.  Denies any weakness.    Lazaro had been eating and drinking well until he developed nausea and vomiting today.  He is passing soft stool.  He has not had any bleeding.  Energy level is low.  He has been taking his medications without difficulty.  No current constipation.  Denies paresthesias. No further dysphagia.  Feels his lymph nodes/masses have completely resolved.  He has not had fever. He is having a hard time falling asleep.    Review of systems:  Remainder of ROS is complete an negative    PMH:   Past Medical History:   Diagnosis Date     Migraine  "2006    have resolved       [facility]:   Family History   Problem Relation Age of Onset     No Known Problems Mother      No Known Problems Father      Asthma Brother      Thyroid Cancer Paternal Grandmother      Melanoma Paternal Aunt        Social History: Lazaro lives at home with his dad and step mom.  He was working full time at [employer] in [place] prior to his diagnosis.    Current Medications:  Current Outpatient Medications   Medication Sig Dispense Refill     dexamethasone (DECADRON) 6 MG tablet Take 1 tablet (6 mg) by mouth every 12 hours for 19 days 38 tablet 0     diphenhydrAMINE (BENADRYL) 25 MG capsule Take 1-2 capsules (25-50 mg) by mouth every 6 hours as needed (Breakthrough Nausea and Vomiting ) 30 capsule 1     melatonin 3 MG tablet Take 1 tablet (3 mg) by mouth At Bedtime 30 tablet 1     NO ACTIVE MEDICATIONS .       ondansetron (ZOFRAN) 8 MG tablet Take 1 tablet (8 mg) by mouth every 6 hours as needed for nausea 30 tablet 1     oxyCODONE (ROXICODONE) 5 MG tablet Take 1 tablet (5 mg) by mouth every 6 hours as needed for moderate to severe pain 10 tablet 0     pantoprazole (PROTONIX) 40 MG EC tablet Take 1 tablet (40 mg) by mouth daily for 20 days . This acid blocker helps prevent stomach pain while on your decadron chemotherapy. 20 tablet 0     polyethylene glycol (MIRALAX/GLYCOLAX) packet Take 17 g by mouth daily 30 packet 0     sennosides (SENOKOT) 8.6 MG tablet Take 2 tablets by mouth 2 times daily as needed for constipation 60 each 1     sulfamethoxazole-trimethoprim (BACTRIM DS/SEPTRA DS) 800-160 MG tablet Take 1 tablet by mouth Every Mon, Tues two times daily 16 tablet 0   Above medications reviewed, no missed doses of dexamethasone.  However Lazaro notes his initial discharge paperwork noted \"6mg dex tabs, take 1 BID\", after his first day home he noted his rx was actually for 2mg tabs so started taking 3 tabs BID at that time.    Physical Exam:   Temp:  [97.4  F (36.3  C)-97.6  F (36.4  C)] 97.4 "  F (36.3  C)  Pulse:  [51-52] 51  Resp:  [18-20] 20  BP: (110-122)/(74-83) 110/74  SpO2:  [99 %-100 %] 100 %  Weight 75.4kg  Weight at diagnosis was 75.2kg, Lazaro considers his baseline weight to be 173-175lbs    General: Lazaro Lund is initially vomiting and in pain upon arrival. After that he is interactive and asks good questions.   HEENT: Skull is atrauamatic and normocephalic. PERRLA, sclera are non icteric and not injected, EOM are intact. Slight disconjugate gaze.  Nares are patent without drainage.  Oropharynx is clear without exudate, erythema or lesions.  Tympanic membranes are opaque bilaterally with light reflex and landmarks present.  Lymph:  Neck is supple without lymphadenopathy.  There is no supraclavicular, axillary or inguinal lymphadenopathy palpated.  Cardiovascular:  HR is bradycardic, S1, S2 no murmur.  Capillary refill is < 2 seconds.  There is no edema.  Respiratory: Respirations are easy.  Lungs are clear to auscultation through out.  No crackles or wheezes.  Gastrointestinal:  BS present in all quadrants.  Abdomen is soft and non-tender.  No hepatosplenomegaly or masses are palpated.  Skin: No rashes, bruises or other skin lesions are noted. PICC site is C/D/I.  Neurological:  CN 2-12 tested and intact with exception of mildly disconjugate gaze. Gait is normal.  No issues with balance. Sensation intact in hands and feet.  Meningeal signs are negative.  Musculoskeletal:  Good strength and ROM in all extremities.  Strong dorsiflexion at ankles and great toes (5/5) bilaterally without any pain at the Achilles.    Labs:   Results for orders placed or performed in visit on 09/12/19   CBC with platelets differential   Result Value Ref Range    WBC 4.6 4.0 - 11.0 10e9/L    RBC Count 5.58 4.4 - 5.9 10e12/L    Hemoglobin 14.7 13.3 - 17.7 g/dL    Hematocrit 45.4 40.0 - 53.0 %    MCV 81 78 - 100 fl    MCH 26.3 (L) 26.5 - 33.0 pg    MCHC 32.4 31.5 - 36.5 g/dL    RDW 12.1 10.0 - 15.0 %    Platelet  Count 163 150 - 450 10e9/L    Diff Method Automated Method     % Neutrophils 84.9 %    % Lymphocytes 13.2 %    % Monocytes 1.5 %    % Eosinophils 0.0 %    % Basophils 0.0 %    % Immature Granulocytes 0.4 %    Nucleated RBCs 0 0 /100    Absolute Neutrophil 3.9 1.6 - 8.3 10e9/L    Absolute Lymphocytes 0.6 (L) 0.8 - 5.3 10e9/L    Absolute Monocytes 0.1 0.0 - 1.3 10e9/L    Absolute Eosinophils 0.0 0.0 - 0.7 10e9/L    Absolute Basophils 0.0 0.0 - 0.2 10e9/L    Abs Immature Granulocytes 0.0 0 - 0.4 10e9/L    Absolute Nucleated RBC 0.0    ABO/Rh type and screen   Result Value Ref Range    ABO PENDING     Antibody Screen PENDING     Test Valid Only At          [facility]    Specimen Expires 09/15/2019          Assessment:  Lazaro Lund is a 19 year old young man with newly diagnosed T Cell lymphoblastic lymphoma (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  He is being treated per [facility]G protocol [id number] and comes to clinic today for hospital follow up, labs and exam.  He is Day 11 of Induction therapy.  His presentation was complicated by a pleural and pericardial effusion with mass effect leading to cardiac tamponade.  Fortunately as his mass has responded to treatment these have improved.  His therapy is being modified to include dexamethasone (instead of prednisone) which is now considered standard of care.  He presents today with a severe headache and initially dizziness after driving here.  Symptoms are most consistent with spinal headache however given the severity and progressive symptoms (and recent dose of asparaginase) I obtained a head CT to rule out central venous thrombus. Initial read on his head CT is clear.  He is bradycardic, his EKG shows sinus gretchen with sinus arrhythmia and right bundle branch block.        Plan:   1. Discussed headache with Lazaro and his mom.  Symptoms are most consistent with spinal headache however his progression of symptoms when driving and associated  dizziness was concerning.  Head CT is reassuring.  Discussed s/sx to monitor for and reasons to call.  2. Tylenol and IV caffeine for spinal headache.  Will plan for IV caffeine following LPs in the future.   3. Discussed EKG findings with cardiology fellow Dr Mensah, interestingly Lazaro has not had a previous EKG that we could find results of. Dr Mensah does not feel further work up is needed at this time but does recommend follow up with cardiology in about a month.  4. Reviewed Induction therapy in detail, reviewing rationale for dexamethasone during Induction. Also discussed upcoming months of therpay in broad terms, will provide calendar for Consolidation next week.  5. Continue dexamethasone until 9/28 AM.  Lazaro missed a small amount of his doses since he was taking 2mg BID (instead of 6mg BID) for two doses upon discharge.  He is taking appropriate dose now and will still plan to finish on 9/28.  6. Provided Lazaro with my card and discussed fever criteria and need to seek immediate care with fever.  Also discussed other s/sx to monitor for and reasons to call.  7. Anticipatory guidance regarding steroid side effects as the month goes on including effects on mood, mobility and eating habits. Also reiterated vincristine side effects and combined potential effect on mobility.  8. Continue bactrim for PCP ppx through out therapy.  9. Will order TPMT testing on Day 15.  10. Plan to check Vitamin D level at the end of Induction.  11. Given Lazaro's marrow was negative at diagnosis he will not require a repeat marrow in the future.  12. Plan to repeat imaging with neck, chest, abdomen, pelvis CT at the end of Induction.  13. RTC on Tuesday for Day 15 therapy, sooner with concerns.     Addendum:  Following caffeine, tylenol and rest Lazaro reports he is feeling much better.  Pain is a 1/10.  His PICC line was not drawing,Lazaro didn't want to wait for TPA.  Discussed that it is possible his line won't work next  week.  If that is the case will consider removing PICC and using PIVs until port can be placed.       YOHAN Dunn CNP

## 2019-09-12 NOTE — PLAN OF CARE
Speech Language Therapy Discharge Summary    Reason for therapy discharge:    All goals and outcomes met, no further needs identified.    Progress towards therapy goal(s). See goals on Care Plan in [facility] electronic health record for goal details.  Goals met    Therapy recommendation(s):    No further therapy is recommended.      Lazaro participated in an evaluation on 9/5/19, which revealed mild oral-pharyngeal dysphagia secondary to mediastinal mass, left axillary and cervical adenopathy, and pleural effusion. Lazaro presented with dysphagia upon admission characterized by coughing on all consistencies and feeling of globus sensation when eating.     At that time, SLP team recommend regular diet with thin liquids. Swallow precautions: moisten foods for the first couple of meals, alternate consistencies, small bites/sip, and sitting upright in the chair if able. SLP team to follow for 1-2x as medical treatment is provided to ensure tolerance of diet and management of dysphagia as tumor is being treated.      Thank you for this referral.   Chantell Grigsby MS, CCC-SLP    Pager: [phone]

## 2019-09-12 NOTE — PROGRESS NOTES
Lazaro Lund is a 19 year old young man with newly diagnosed T-cell lymphoblastic lymphoma. Lazaro presented with acute onset cough and was found to have an anterior mediastinal mass and malignant left-sided pleural effusion.  A CT guided biopsy was obtained at [facility] and pathology was consistent with T-cell leukemia vs lymphoma.  He was admitted to [facility] oncology service 8/30 and started on treatment per [id number].  He had a pleural effusion that required a chest tube and a pericardial effusion that was not drained. His Induction was complicated by cardiac compression secondary to his mass leading to tamponade, this improved through out his hospital stay.  He also had dysphagia that improved with treatment of his mass, swallow study was normal.    Lazaro comes to clinic today for his first outpatient visit, he drove himself and is initially alone for the appt.  He is Day 11 of Induction therapy.  Lazaro notes he's had a headache since Monday, this started shortly after his LP.  It is positional in nature and gets much worse when he sits up.  He notes the headache seems to be getting worse and upon driving here today he became very dizzy and felt out of it.  After lying down the dizziness is improving.  He notes pain in the occipital areas, denies changes in vision.  Denies any weakness.    Lazaro had been eating and drinking well until he developed nausea and vomiting today.  He is passing soft stool.  He has not had any bleeding.  Energy level is low.  He has been taking his medications without difficulty.  No current constipation.  Denies paresthesias. No further dysphagia.  Feels his lymph nodes/masses have completely resolved.  He has not had fever. He is having a hard time falling asleep.    Review of systems:  Remainder of ROS is complete an negative    PMH:   Past Medical History:   Diagnosis Date     Migraine 2006    have resolved       [facility]:   Family History   Problem Relation Age of Onset     No  "Known Problems Mother      No Known Problems Father      Asthma Brother      Thyroid Cancer Paternal Grandmother      Melanoma Paternal Aunt        Social History: Lazaro lives at home with his dad and step mom.  He was working full time at [employer] in [place] prior to his diagnosis.    Current Medications:  Current Outpatient Medications   Medication Sig Dispense Refill     dexamethasone (DECADRON) 6 MG tablet Take 1 tablet (6 mg) by mouth every 12 hours for 19 days 38 tablet 0     diphenhydrAMINE (BENADRYL) 25 MG capsule Take 1-2 capsules (25-50 mg) by mouth every 6 hours as needed (Breakthrough Nausea and Vomiting ) 30 capsule 1     melatonin 3 MG tablet Take 1 tablet (3 mg) by mouth At Bedtime 30 tablet 1     NO ACTIVE MEDICATIONS .       ondansetron (ZOFRAN) 8 MG tablet Take 1 tablet (8 mg) by mouth every 6 hours as needed for nausea 30 tablet 1     oxyCODONE (ROXICODONE) 5 MG tablet Take 1 tablet (5 mg) by mouth every 6 hours as needed for moderate to severe pain 10 tablet 0     pantoprazole (PROTONIX) 40 MG EC tablet Take 1 tablet (40 mg) by mouth daily for 20 days . This acid blocker helps prevent stomach pain while on your decadron chemotherapy. 20 tablet 0     polyethylene glycol (MIRALAX/GLYCOLAX) packet Take 17 g by mouth daily 30 packet 0     sennosides (SENOKOT) 8.6 MG tablet Take 2 tablets by mouth 2 times daily as needed for constipation 60 each 1     sulfamethoxazole-trimethoprim (BACTRIM DS/SEPTRA DS) 800-160 MG tablet Take 1 tablet by mouth Every Mon, Tues two times daily 16 tablet 0   Above medications reviewed, no missed doses of dexamethasone.  However Lazaro notes his initial discharge paperwork noted \"6mg dex tabs, take 1 BID\", after his first day home he noted his rx was actually for 2mg tabs so started taking 3 tabs BID at that time.    Physical Exam:   Temp:  [97.4  F (36.3  C)-97.6  F (36.4  C)] 97.4  F (36.3  C)  Pulse:  [51-52] 51  Resp:  [18-20] 20  BP: (110-122)/(74-83) 110/74  SpO2:  " [99 %-100 %] 100 %  Weight 75.4kg  Weight at diagnosis was 75.2kg, Lazaro considers his baseline weight to be 173-175lbs    General: Lazaro Lund is initially vomiting and in pain upon arrival. After that he is interactive and asks good questions.   HEENT: Skull is atrauamatic and normocephalic. PERRLA, sclera are non icteric and not injected, EOM are intact. Slight disconjugate gaze.  Nares are patent without drainage.  Oropharynx is clear without exudate, erythema or lesions.  Tympanic membranes are opaque bilaterally with light reflex and landmarks present.  Lymph:  Neck is supple without lymphadenopathy.  There is no supraclavicular, axillary or inguinal lymphadenopathy palpated.  Cardiovascular:  HR is bradycardic, S1, S2 no murmur.  Capillary refill is < 2 seconds.  There is no edema.  Respiratory: Respirations are easy.  Lungs are clear to auscultation through out.  No crackles or wheezes.  Gastrointestinal:  BS present in all quadrants.  Abdomen is soft and non-tender.  No hepatosplenomegaly or masses are palpated.  Skin: No rashes, bruises or other skin lesions are noted. PICC site is C/D/I.  Neurological:  CN 2-12 tested and intact with exception of mildly disconjugate gaze. Gait is normal.  No issues with balance. Sensation intact in hands and feet.  Meningeal signs are negative.  Musculoskeletal:  Good strength and ROM in all extremities.  Strong dorsiflexion at ankles and great toes (5/5) bilaterally without any pain at the Achilles.    Labs:   Results for orders placed or performed in visit on 09/12/19   CBC with platelets differential   Result Value Ref Range    WBC 4.6 4.0 - 11.0 10e9/L    RBC Count 5.58 4.4 - 5.9 10e12/L    Hemoglobin 14.7 13.3 - 17.7 g/dL    Hematocrit 45.4 40.0 - 53.0 %    MCV 81 78 - 100 fl    MCH 26.3 (L) 26.5 - 33.0 pg    MCHC 32.4 31.5 - 36.5 g/dL    RDW 12.1 10.0 - 15.0 %    Platelet Count 163 150 - 450 10e9/L    Diff Method Automated Method     % Neutrophils 84.9 %    %  Lymphocytes 13.2 %    % Monocytes 1.5 %    % Eosinophils 0.0 %    % Basophils 0.0 %    % Immature Granulocytes 0.4 %    Nucleated RBCs 0 0 /100    Absolute Neutrophil 3.9 1.6 - 8.3 10e9/L    Absolute Lymphocytes 0.6 (L) 0.8 - 5.3 10e9/L    Absolute Monocytes 0.1 0.0 - 1.3 10e9/L    Absolute Eosinophils 0.0 0.0 - 0.7 10e9/L    Absolute Basophils 0.0 0.0 - 0.2 10e9/L    Abs Immature Granulocytes 0.0 0 - 0.4 10e9/L    Absolute Nucleated RBC 0.0    ABO/Rh type and screen   Result Value Ref Range    ABO PENDING     Antibody Screen PENDING     Test Valid Only At          [facility]    Specimen Expires 09/15/2019          Assessment:  Lazaro Lund is a 19 year old young man with newly diagnosed T Cell lymphoblastic lymphoma (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  He is being treated per [facility] protocol [id number] and comes to clinic today for hospital follow up, labs and exam.  He is Day 11 of Induction therapy.  His presentation was complicated by a pleural and pericardial effusion with mass effect leading to cardiac tamponade.  Fortunately as his mass has responded to treatment these have improved.  His therapy is being modified to include dexamethasone (instead of prednisone) which is now considered standard of care.  He presents today with a severe headache and initially dizziness after driving here.  Symptoms are most consistent with spinal headache however given the severity and progressive symptoms (and recent dose of asparaginase) I obtained a head CT to rule out central venous thrombus. Initial read on his head CT is clear.  He is bradycardic, his EKG shows sinus gretchen with sinus arrhythmia and right bundle branch block.        Plan:   1. Discussed headache with Lazaro and his mom.  Symptoms are most consistent with spinal headache however his progression of symptoms when driving and associated dizziness was concerning.  Head CT is reassuring.  Discussed s/sx to monitor for and  reasons to call.  2. Tylenol and IV caffeine for spinal headache.  Will plan for IV caffeine following LPs in the future.   3. Discussed EKG findings with cardiology fellow Dr Mensah, interestingly Lazaro has not had a previous EKG that we could find results of. Dr Mensah does not feel further work up is needed at this time but does recommend follow up with cardiology in about a month.  4. Reviewed Induction therapy in detail, reviewing rationale for dexamethasone during Induction. Also discussed upcoming months of therpay in broad terms, will provide calendar for Consolidation next week.  5. Continue dexamethasone until 9/28 AM.  Lazaro missed a small amount of his doses since he was taking 2mg BID (instead of 6mg BID) for two doses upon discharge.  He is taking appropriate dose now and will still plan to finish on 9/28.  6. Provided Lazaro with my card and discussed fever criteria and need to seek immediate care with fever.  Also discussed other s/sx to monitor for and reasons to call.  7. Anticipatory guidance regarding steroid side effects as the month goes on including effects on mood, mobility and eating habits. Also reiterated vincristine side effects and combined potential effect on mobility.  8. Continue bactrim for PCP ppx through out therapy.  9. Will order TPMT testing on Day 15.  10. Plan to check Vitamin D level at the end of Induction.  11. Given Lazaro's marrow was negative at diagnosis he will not require a repeat marrow in the future.  12. Plan to repeat imaging with neck, chest, abdomen, pelvis CT at the end of Induction.  13. RTC on Tuesday for Day 15 therapy, sooner with concerns.     Addendum:  Following caffeine, tylenol and rest Lazaro reports he is feeling much better.  Pain is a 1/10.  His PICC line was not drawing,Lazaro didn't want to wait for TPA.  Discussed that it is possible his line won't work next week.  If that is the case will consider removing PICC and using PIVs until port can  be placed.     Addendum: Lazaro's visits are considered urgent/emergent and cannot be postponed based on insurance issues.

## 2019-09-12 NOTE — PROGRESS NOTES
[facility]  PEDIATRIC HEMATOLOGY/ONCOLOGY   SOCIAL WORK PROGRESS NOTE      DATA:     Lazaro is a 19 year old young man with newly diagnosed T-cell lymphoblastic lymphoma. Lazaro presented with acute onset cough and was found to have an anterior mediastinal mass and malignant left-sided pleural effusion. He presents to clinic infusion today, unaccompanied, for day 11 of induction therapy per [id number]. SW met briefly with Lazaro to introduce self, role of H/O SW, provide contact information, offer support and build rapport. Our visit was brief as Lazaro was not feeling well today.     Lazaro is currently residing with his Dad, Jean-Pierre and step-siblings in [place]. His parents are . Mom, Carmen lives in [place]. He has a good relationship with both parents. He is thinking about going to live with his mother as he goes through treatment as his father has young kids at home. He has not yet made a final decision surrounding this. He is planning on quitting his job at a [employer] in [place] and has started an application for Medical Assistance (MA) on the [employer] website. He applied for Social Security and has an in person interview in early October. He has a monthly parking pass. We will discuss mileage and parking reimbursement once he is approved for MA and is feeling a bit better. SW will plan on introducing Health Care Directive and providing education at upcoming visits. Full psychosocial assessment to follow.     INTERVENTION:     1. Introduction of Hem/Onc SW, role, and contact information provided.   2. Supportive check-in.   3. Establishment of rapport and trust.     ASSESSMENT:     Lazaro shared having a pretty awful headache. He has started feeling nauseous. He will be meeting with Luana AU to discuss management of these side effects. These are new for him and are hitting him quite hard. He appears well supported by both parents. He appeared open to ongoing SW  support. He appears to be coping with his new diagnosis appropriately at present. SW will continue to assess coping, offer support, connection with resources, etc.     PLAN:     Social work will continue to assess needs and provide ongoing psychosocial support and access to resources.      CHEY Davis, LICHONEY, OSW-C  Clinical    Pediatric Hematology Oncology   [facility]   Monday-Thursday   Phone: [phone]  Pager: [phone]    NO LETTER

## 2019-09-16 ENCOUNTER — INFUSION THERAPY VISIT (OUTPATIENT)
Dept: INFUSION THERAPY | Facility: CLINIC | Age: 19
End: 2019-09-16
Attending: INTERNAL MEDICINE
Payer: COMMERCIAL

## 2019-09-16 ENCOUNTER — OFFICE VISIT (OUTPATIENT)
Dept: PEDIATRIC HEMATOLOGY/ONCOLOGY | Facility: CLINIC | Age: 19
End: 2019-09-16
Attending: NURSE PRACTITIONER
Payer: COMMERCIAL

## 2019-09-16 VITALS
HEART RATE: 74 BPM | OXYGEN SATURATION: 100 % | WEIGHT: 164.9 LBS | BODY MASS INDEX: 22.46 KG/M2 | RESPIRATION RATE: 20 BRPM | SYSTOLIC BLOOD PRESSURE: 115 MMHG | TEMPERATURE: 98.4 F | DIASTOLIC BLOOD PRESSURE: 72 MMHG

## 2019-09-16 DIAGNOSIS — D69.2 PURPURA (H): ICD-10-CM

## 2019-09-16 DIAGNOSIS — C83.50 T LYMPHOBLASTIC LYMPHOMA (H): Primary | ICD-10-CM

## 2019-09-16 DIAGNOSIS — D68.8 HYPOFIBRINOGENEMIA (H): ICD-10-CM

## 2019-09-16 LAB
ALBUMIN SERPL-MCNC: 2.7 G/DL (ref 3.4–5)
ALP SERPL-CCNC: 144 U/L (ref 40–150)
ALT SERPL W P-5'-P-CCNC: 55 U/L (ref 0–70)
ANION GAP SERPL CALCULATED.3IONS-SCNC: 4 MMOL/L (ref 3–14)
APTT PPP: 32 SEC (ref 22–37)
AST SERPL W P-5'-P-CCNC: 17 U/L (ref 0–45)
BASOPHILS # BLD AUTO: 0 10E9/L (ref 0–0.2)
BASOPHILS NFR BLD AUTO: 0.2 %
BILIRUB SERPL-MCNC: 0.5 MG/DL (ref 0.2–1.3)
BUN SERPL-MCNC: 29 MG/DL (ref 7–30)
CALCIUM SERPL-MCNC: 7.5 MG/DL (ref 8.5–10.1)
CHLORIDE SERPL-SCNC: 103 MMOL/L (ref 94–109)
CO2 SERPL-SCNC: 28 MMOL/L (ref 20–32)
CREAT SERPL-MCNC: 0.56 MG/DL (ref 0.66–1.25)
D DIMER PPP FEU-MCNC: 1.6 UG/ML FEU (ref 0–0.5)
DIFFERENTIAL METHOD BLD: ABNORMAL
EOSINOPHIL # BLD AUTO: 0 10E9/L (ref 0–0.7)
EOSINOPHIL NFR BLD AUTO: 0 %
ERYTHROCYTE [DISTWIDTH] IN BLOOD BY AUTOMATED COUNT: 13 % (ref 10–15)
FIBRINOGEN PPP-MCNC: 75 MG/DL (ref 200–420)
GFR SERPL CREATININE-BSD FRML MDRD: >90 ML/MIN/{1.73_M2}
GLUCOSE SERPL-MCNC: 109 MG/DL (ref 70–99)
HCT VFR BLD AUTO: 41.5 % (ref 40–53)
HGB BLD-MCNC: 13.2 G/DL (ref 13.3–17.7)
IMM GRANULOCYTES # BLD: 0.1 10E9/L (ref 0–0.4)
IMM GRANULOCYTES NFR BLD: 1.9 %
INR PPP: 1.26 (ref 0.86–1.14)
LYMPHOCYTES # BLD AUTO: 1 10E9/L (ref 0.8–5.3)
LYMPHOCYTES NFR BLD AUTO: 17.9 %
MCH RBC QN AUTO: 26.4 PG (ref 26.5–33)
MCHC RBC AUTO-ENTMCNC: 31.8 G/DL (ref 31.5–36.5)
MCV RBC AUTO: 83 FL (ref 78–100)
MONOCYTES # BLD AUTO: 0.3 10E9/L (ref 0–1.3)
MONOCYTES NFR BLD AUTO: 4.7 %
NEUTROPHILS # BLD AUTO: 4 10E9/L (ref 1.6–8.3)
NEUTROPHILS NFR BLD AUTO: 75.3 %
NRBC # BLD AUTO: 0 10*3/UL
NRBC BLD AUTO-RTO: 0 /100
PLATELET # BLD AUTO: 195 10E9/L (ref 150–450)
POTASSIUM SERPL-SCNC: 4.4 MMOL/L (ref 3.4–5.3)
PROT SERPL-MCNC: 5.2 G/DL (ref 6.8–8.8)
RBC # BLD AUTO: 5 10E12/L (ref 4.4–5.9)
SODIUM SERPL-SCNC: 135 MMOL/L (ref 133–144)
WBC # BLD AUTO: 5.4 10E9/L (ref 4–11)

## 2019-09-16 PROCEDURE — 80053 COMPREHEN METABOLIC PANEL: CPT | Performed by: NURSE PRACTITIONER

## 2019-09-16 PROCEDURE — 36593 DECLOT VASCULAR DEVICE: CPT

## 2019-09-16 PROCEDURE — 85730 THROMBOPLASTIN TIME PARTIAL: CPT | Performed by: NURSE PRACTITIONER

## 2019-09-16 PROCEDURE — 85025 COMPLETE CBC W/AUTO DIFF WBC: CPT | Performed by: NURSE PRACTITIONER

## 2019-09-16 PROCEDURE — 85384 FIBRINOGEN ACTIVITY: CPT | Performed by: NURSE PRACTITIONER

## 2019-09-16 PROCEDURE — 85379 FIBRIN DEGRADATION QUANT: CPT | Performed by: NURSE PRACTITIONER

## 2019-09-16 PROCEDURE — 36415 COLL VENOUS BLD VENIPUNCTURE: CPT | Performed by: NURSE PRACTITIONER

## 2019-09-16 PROCEDURE — 85610 PROTHROMBIN TIME: CPT | Performed by: NURSE PRACTITIONER

## 2019-09-16 PROCEDURE — 25000128 H RX IP 250 OP 636: Mod: ZF | Performed by: NURSE PRACTITIONER

## 2019-09-16 RX ADMIN — ALTEPLASE 2 MG: 2.2 INJECTION, POWDER, LYOPHILIZED, FOR SOLUTION INTRAVENOUS at 15:06

## 2019-09-16 NOTE — PROGRESS NOTES
Lazaro Lund is a 19 year old young man with newly diagnosed T-cell lymphoblastic lymphoma. Lazaro presented with acute onset cough and was found to have an anterior mediastinal mass and malignant left-sided pleural effusion.  A CT guided biopsy was obtained at [facility] and pathology was consistent with T-cell leukemia vs lymphoma.  He was admitted to [facility] oncology service 8/30 and started on treatment per [id number].  He had a pleural effusion that required a chest tube and a pericardial effusion that was not drained. His Induction was complicated by cardiac compression secondary to his mass leading to tamponade, this improved through out his hospital stay.  He also had dysphagia that improved with treatment of his mass, swallow study was normal.    HPI:  Lazaro comes to clinic today with some oozing from his PICC site.  He says that he noticed it 2 days ago after waking up from sleeping.  He also had some new rash on his chest.  No nosebleeds.  Had a slight amount of gum bleeding with tooth brushing one day but no other bleeding.  No hematemesis or hematochezia, hematuria.  Headaches is almost completely resolved.  No issues with dizziness or weakness.  No cough or dyspnea.  No palpitations.  No N/V/D/C.  He is having a hard time falling asleep.    Review of systems:  Remainder of ROS is complete an negative    PMH:   Past Medical History:   Diagnosis Date     Migraine 2006    have resolved       PFMH:   Family History   Problem Relation Age of Onset     No Known Problems Mother      No Known Problems Father      Asthma Brother      Thyroid Cancer Paternal Grandmother      Melanoma Paternal Aunt        Social History: Lazaro lives at home with his dad and step mom.  He was working full time at [employer] in [place] prior to his diagnosis.    Current Medications:  Current Outpatient Medications   Medication Sig Dispense Refill     dexamethasone (DECADRON) 6 MG tablet Take 1 tablet (6 mg) by mouth every 12 hours  for 19 days 38 tablet 0     diphenhydrAMINE (BENADRYL) 25 MG capsule Take 1-2 capsules (25-50 mg) by mouth every 6 hours as needed (Breakthrough Nausea and Vomiting ) 30 capsule 1     melatonin 3 MG tablet Take 1 tablet (3 mg) by mouth At Bedtime 30 tablet 1     NO ACTIVE MEDICATIONS .       ondansetron (ZOFRAN) 8 MG tablet Take 1 tablet (8 mg) by mouth every 6 hours as needed for nausea 30 tablet 1     oxyCODONE (ROXICODONE) 5 MG tablet Take 1 tablet (5 mg) by mouth every 6 hours as needed for moderate to severe pain 10 tablet 0     pantoprazole (PROTONIX) 40 MG EC tablet Take 1 tablet (40 mg) by mouth daily for 20 days . This acid blocker helps prevent stomach pain while on your decadron chemotherapy. 20 tablet 0     polyethylene glycol (MIRALAX/GLYCOLAX) packet Take 17 g by mouth daily 30 packet 0     sennosides (SENOKOT) 8.6 MG tablet Take 2 tablets by mouth 2 times daily as needed for constipation 60 each 1     sulfamethoxazole-trimethoprim (BACTRIM DS/SEPTRA DS) 800-160 MG tablet Take 1 tablet by mouth Every Mon, Tues two times daily 16 tablet 0   Above medications reviewed, no missed doses of dexamethasone.      Physical Exam:   Temp:  [98.4  F (36.9  C)] 98.4  F (36.9  C)  Pulse:  [74] 74  Resp:  [20] 20  BP: (115)/(72) 115/72  SpO2:  [100 %] 100 %    Wt Readings from Last 4 Encounters:   09/16/19 74.8 kg (164 lb 14.5 oz)   09/08/19 75.4 kg (166 lb 3.6 oz)   08/22/13 66.4 kg (146 lb 6.4 oz) (77 %)*   08/19/10 47.4 kg (104 lb 8 oz) (72 %)*     * Growth percentiles are based on CDC (Boys, 2-20 Years) data.     Weight at diagnosis was 75.2kg, Lazaro considers his baseline weight to be 173-175lbs    General: Lazaro Lund is age appropriate and no significant distress  HEENT: Skull is atrauamatic and normocephalic. PERRLA, sclera are non icteric and not injected, EOM are intact.   Nares are patent without drainage.  Oropharynx is clear without exudate, erythema or lesions.  Tympanic membranes are opaque  bilaterally with light reflex and landmarks present.  Lymph:  Neck is supple without lymphadenopathy.  There is no supraclavicular or inguinal lymphadenopathy palpated.  Cardiovascular:  RRR, S1, S2 no murmur.  Capillary refill is < 2 seconds.  There is no edema.  2+ peripheral pulses.  Respiratory: Respirations are easy.  Lungs are clear to auscultation through out.  No crackles or wheezes.  Gastrointestinal:  BS present in all quadrants.  Abdomen is soft and non-tender.  No hepatosplenomegaly or masses are palpated.  Skin: Acneiform rash to chest and back with small amount of petechiae interspersed.  PICC site with purpura noted under the dressing and some dried blood.  Site itself without drainage.  Neurological:  Gait is normal.  No issues with balance. Sensation intact in hands and feet.   Musculoskeletal:  Good strength and ROM in all extremities.          Labs:   Results for orders placed or performed in visit on 09/16/19   CBC with platelets differential   Result Value Ref Range    WBC 5.4 4.0 - 11.0 10e9/L    RBC Count 5.00 4.4 - 5.9 10e12/L    Hemoglobin 13.2 (L) 13.3 - 17.7 g/dL    Hematocrit 41.5 40.0 - 53.0 %    MCV 83 78 - 100 fl    MCH 26.4 (L) 26.5 - 33.0 pg    MCHC 31.8 31.5 - 36.5 g/dL    RDW 13.0 10.0 - 15.0 %    Platelet Count 195 150 - 450 10e9/L    Diff Method Automated Method     % Neutrophils 75.3 %    % Lymphocytes 17.9 %    % Monocytes 4.7 %    % Eosinophils 0.0 %    % Basophils 0.2 %    % Immature Granulocytes 1.9 %    Nucleated RBCs 0 0 /100    Absolute Neutrophil 4.0 1.6 - 8.3 10e9/L    Absolute Lymphocytes 1.0 0.8 - 5.3 10e9/L    Absolute Monocytes 0.3 0.0 - 1.3 10e9/L    Absolute Eosinophils 0.0 0.0 - 0.7 10e9/L    Absolute Basophils 0.0 0.0 - 0.2 10e9/L    Abs Immature Granulocytes 0.1 0 - 0.4 10e9/L    Absolute Nucleated RBC 0.0    Fibrinogen activity   Result Value Ref Range    Fibrinogen 75 (LL) 200 - 420 mg/dL   Partial thromboplastin time   Result Value Ref Range    PTT 32 22 -  37 sec   INR   Result Value Ref Range    INR 1.26 (H) 0.86 - 1.14   D dimer quantitative   Result Value Ref Range    D Dimer 1.6 (H) 0.0 - 0.50 ug/ml FEU   Comprehensive metabolic panel   Result Value Ref Range    Sodium 135 133 - 144 mmol/L    Potassium 4.4 3.4 - 5.3 mmol/L    Chloride 103 94 - 109 mmol/L    Carbon Dioxide 28 20 - 32 mmol/L    Anion Gap 4 3 - 14 mmol/L    Glucose 109 (H) 70 - 99 mg/dL    Urea Nitrogen 29 7 - 30 mg/dL    Creatinine 0.56 (L) 0.66 - 1.25 mg/dL    GFR Estimate >90 >60 mL/min/[1.73_m2]    GFR Estimate If Black >90 >60 mL/min/[1.73_m2]    Calcium 7.5 (L) 8.5 - 10.1 mg/dL    Bilirubin Total 0.5 0.2 - 1.3 mg/dL    Albumin 2.7 (L) 3.4 - 5.0 g/dL    Protein Total 5.2 (L) 6.8 - 8.8 g/dL    Alkaline Phosphatase 144 40 - 150 U/L    ALT 55 0 - 70 U/L    AST 17 0 - 45 U/L         Assessment:  Lazaro Lund is a 19 year old young man with newly diagnosed T Cell lymphoblastic lymphoma (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  He is being treated per COG protocol [id number] and comes to clinic today for hospital follow up, labs and exam.  He is Day 14 of Induction therapy.  His presentation was complicated by a pleural and pericardial effusion with mass effect leading to cardiac tamponade.  Fortunately as his mass has responded to treatment these have improved.  His therapy is being modified to include dexamethasone (instead of prednisone) which is now considered standard of care.  He presents today with oozing from his PICC site with some purpura noted in that area as well as some petechiae to his trunk.  Headache is resolved. CMP with normal liver function but low fibrinogen and slightly elevated D-dimer most likely from recent PEG-asparaginase administration.  Given mild bleeding/purpura to PICC area will defer cryoprecipitate administration.  PICC line isn't drawing well so RNs will TPA.  Platelets normal.    Plan:   1. Bleeding by PICC site.  Platelets WNL.  Coags with low fibrinogen and mildly elevated  D-dimer.  Discussed with primary NP.  Will defer cryo given no other significant bleeding.  Asked pt to call right away with significant change in symptoms, new bleeding etc.  2. Tylenol and IV caffeine for spinal headache last week.  Will plan for IV caffeine following LPs in the future.   3. Cardiology follow up with Dr. Butler 10/14/19; h/o cardiac tamponade and pericardial effusion.  4.  Continue dexamethasone until 9/28 AM.  Lazaro missed a small amount of his doses since he was taking 2mg BID (instead of 6mg BID) for two doses upon discharge.  He is taking appropriate dose now and will still plan to finish on 9/28.  5.  Continue bactrim for PCP ppx through out therapy.  6. Will order TPMT testing on Day 15.  7. Plan to check Vitamin D level at the end of Induction.  8. Given Lazaro's marrow was negative at diagnosis he will not require a repeat marrow in the future.  9. Plan to repeat imaging with neck, chest, abdomen, pelvis CT at the end of Induction.  10. RTC on Tuesday for Day 15 therapy, sooner with concerns.   11.  Lazaro's visits are considered urgent given his need for cancer treatment.  12.  TPA to PICC:  Tried one dose today with no blood return.  Pt would prefer to try second dose tomorrow.  If line continues to not work, then would pull PICC and use PIV, peripheral stick for chemotherapy until port can be placed on 10/1/19.    Mila Montague MSN, APRN, CPNP-AC, CPON  Department of Pediatrics  Division of Hematology/Oncology

## 2019-09-16 NOTE — LETTER
9/16/2019      RE: Lazaro Lund  [address] [facility] [id number]       Lazaro Lund is a 19 year old young man with newly diagnosed T-cell lymphoblastic lymphoma. Lazaro presented with acute onset cough and was found to have an anterior mediastinal mass and malignant left-sided pleural effusion.  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  He was admitted to [facility] oncology service 8/30 and started on treatment per [id number].  He had a pleural effusion that required a chest tube and a pericardial effusion that was not drained. His Induction was complicated by cardiac compression secondary to his mass leading to tamponade, this improved through out his hospital stay.  He also had dysphagia that improved with treatment of his mass, swallow study was normal.    HPI:  Lazaro comes to clinic today with some oozing from his PICC site.  He says that he noticed it 2 days ago after waking up from sleeping.  He also had some new rash on his chest.  No nosebleeds.  Had a slight amount of gum bleeding with tooth brushing one day but no other bleeding.  No hematemesis or hematochezia, hematuria.  Headaches is almost completely resolved.  No issues with dizziness or weakness.  No cough or dyspnea.  No palpitations.  No N/V/D/C.  He is having a hard time falling asleep.    Review of systems:  Remainder of ROS is complete an negative    PMH:   Past Medical History:   Diagnosis Date     Migraine 2006    have resolved       PFMH:   Family History   Problem Relation Age of Onset     No Known Problems Mother      No Known Problems Father      Asthma Brother      Thyroid Cancer Paternal Grandmother      Melanoma Paternal Aunt        Social History: Lazaro lives at home with his dad and step mom.  He was working full time at [employer] in [place] prior to his diagnosis.    Current Medications:  Current Outpatient Medications   Medication Sig Dispense  Refill     dexamethasone (DECADRON) 6 MG tablet Take 1 tablet (6 mg) by mouth every 12 hours for 19 days 38 tablet 0     diphenhydrAMINE (BENADRYL) 25 MG capsule Take 1-2 capsules (25-50 mg) by mouth every 6 hours as needed (Breakthrough Nausea and Vomiting ) 30 capsule 1     melatonin 3 MG tablet Take 1 tablet (3 mg) by mouth At Bedtime 30 tablet 1     NO ACTIVE MEDICATIONS .       ondansetron (ZOFRAN) 8 MG tablet Take 1 tablet (8 mg) by mouth every 6 hours as needed for nausea 30 tablet 1     oxyCODONE (ROXICODONE) 5 MG tablet Take 1 tablet (5 mg) by mouth every 6 hours as needed for moderate to severe pain 10 tablet 0     pantoprazole (PROTONIX) 40 MG EC tablet Take 1 tablet (40 mg) by mouth daily for 20 days . This acid blocker helps prevent stomach pain while on your decadron chemotherapy. 20 tablet 0     polyethylene glycol (MIRALAX/GLYCOLAX) packet Take 17 g by mouth daily 30 packet 0     sennosides (SENOKOT) 8.6 MG tablet Take 2 tablets by mouth 2 times daily as needed for constipation 60 each 1     sulfamethoxazole-trimethoprim (BACTRIM DS/SEPTRA DS) 800-160 MG tablet Take 1 tablet by mouth Every Mon, Tues two times daily 16 tablet 0   Above medications reviewed, no missed doses of dexamethasone.      Physical Exam:   Temp:  [98.4  F (36.9  C)] 98.4  F (36.9  C)  Pulse:  [74] 74  Resp:  [20] 20  BP: (115)/(72) 115/72  SpO2:  [100 %] 100 %    Wt Readings from Last 4 Encounters:   09/16/19 74.8 kg (164 lb 14.5 oz)   09/08/19 75.4 kg (166 lb 3.6 oz)   08/22/13 66.4 kg (146 lb 6.4 oz) (77 %)*   08/19/10 47.4 kg (104 lb 8 oz) (72 %)*     * Growth percentiles are based on CDC (Boys, 2-20 Years) data.     Weight at diagnosis was 75.2kg, Lazaro considers his baseline weight to be 173-175lbs    General: Lazaro Lund is age appropriate and no significant distress  HEENT: Skull is atrauamatic and normocephalic. PERRLA, sclera are non icteric and not injected, EOM are intact.   Nares are patent without drainage.   Oropharynx is clear without exudate, erythema or lesions.  Tympanic membranes are opaque bilaterally with light reflex and landmarks present.  Lymph:  Neck is supple without lymphadenopathy.  There is no supraclavicular or inguinal lymphadenopathy palpated.  Cardiovascular:  RRR, S1, S2 no murmur.  Capillary refill is < 2 seconds.  There is no edema.  2+ peripheral pulses.  Respiratory: Respirations are easy.  Lungs are clear to auscultation through out.  No crackles or wheezes.  Gastrointestinal:  BS present in all quadrants.  Abdomen is soft and non-tender.  No hepatosplenomegaly or masses are palpated.  Skin: Acneiform rash to chest and back with small amount of petechiae interspersed.  PICC site with purpura noted under the dressing and some dried blood.  Site itself without drainage.  Neurological:  Gait is normal.  No issues with balance. Sensation intact in hands and feet.   Musculoskeletal:  Good strength and ROM in all extremities.          Labs:   Results for orders placed or performed in visit on 09/16/19   CBC with platelets differential   Result Value Ref Range    WBC 5.4 4.0 - 11.0 10e9/L    RBC Count 5.00 4.4 - 5.9 10e12/L    Hemoglobin 13.2 (L) 13.3 - 17.7 g/dL    Hematocrit 41.5 40.0 - 53.0 %    MCV 83 78 - 100 fl    MCH 26.4 (L) 26.5 - 33.0 pg    MCHC 31.8 31.5 - 36.5 g/dL    RDW 13.0 10.0 - 15.0 %    Platelet Count 195 150 - 450 10e9/L    Diff Method Automated Method     % Neutrophils 75.3 %    % Lymphocytes 17.9 %    % Monocytes 4.7 %    % Eosinophils 0.0 %    % Basophils 0.2 %    % Immature Granulocytes 1.9 %    Nucleated RBCs 0 0 /100    Absolute Neutrophil 4.0 1.6 - 8.3 10e9/L    Absolute Lymphocytes 1.0 0.8 - 5.3 10e9/L    Absolute Monocytes 0.3 0.0 - 1.3 10e9/L    Absolute Eosinophils 0.0 0.0 - 0.7 10e9/L    Absolute Basophils 0.0 0.0 - 0.2 10e9/L    Abs Immature Granulocytes 0.1 0 - 0.4 10e9/L    Absolute Nucleated RBC 0.0    Fibrinogen activity   Result Value Ref Range    Fibrinogen 75  (LL) 200 - 420 mg/dL   Partial thromboplastin time   Result Value Ref Range    PTT 32 22 - 37 sec   INR   Result Value Ref Range    INR 1.26 (H) 0.86 - 1.14   D dimer quantitative   Result Value Ref Range    D Dimer 1.6 (H) 0.0 - 0.50 ug/ml FEU   Comprehensive metabolic panel   Result Value Ref Range    Sodium 135 133 - 144 mmol/L    Potassium 4.4 3.4 - 5.3 mmol/L    Chloride 103 94 - 109 mmol/L    Carbon Dioxide 28 20 - 32 mmol/L    Anion Gap 4 3 - 14 mmol/L    Glucose 109 (H) 70 - 99 mg/dL    Urea Nitrogen 29 7 - 30 mg/dL    Creatinine 0.56 (L) 0.66 - 1.25 mg/dL    GFR Estimate >90 >60 mL/min/[1.73_m2]    GFR Estimate If Black >90 >60 mL/min/[1.73_m2]    Calcium 7.5 (L) 8.5 - 10.1 mg/dL    Bilirubin Total 0.5 0.2 - 1.3 mg/dL    Albumin 2.7 (L) 3.4 - 5.0 g/dL    Protein Total 5.2 (L) 6.8 - 8.8 g/dL    Alkaline Phosphatase 144 40 - 150 U/L    ALT 55 0 - 70 U/L    AST 17 0 - 45 U/L         Assessment:  Lazaro Lund is a 19 year old young man with newly diagnosed T Cell lymphoblastic lymphoma (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  He is being treated per COG protocol [id number] and comes to clinic today for hospital follow up, labs and exam.  He is Day 14 of Induction therapy.  His presentation was complicated by a pleural and pericardial effusion with mass effect leading to cardiac tamponade.  Fortunately as his mass has responded to treatment these have improved.  His therapy is being modified to include dexamethasone (instead of prednisone) which is now considered standard of care.  He presents today with oozing from his PICC site with some purpura noted in that area as well as some petechiae to his trunk.  Headache is resolved. CMP with normal liver function but low fibrinogen and slightly elevated D-dimer most likely from recent PEG-asparaginase administration.  Given mild bleeding/purpura to PICC area will defer cryoprecipitate administration.  PICC line isn't drawing well so RNs will TPA.  Platelets normal.    Plan:   1.  Bleeding by PICC site.  Platelets WNL.  Coags with low fibrinogen and mildly elevated D-dimer.  Discussed with primary NP.  Will defer cryo given no other significant bleeding.  Asked pt to call right away with significant change in symptoms, new bleeding etc.  2. Tylenol and IV caffeine for spinal headache last week.  Will plan for IV caffeine following LPs in the future.   3. Cardiology follow up with Dr. Butler 10/14/19; h/o cardiac tamponade and pericardial effusion.  4.  Continue dexamethasone until 9/28 AM.  Lazaro missed a small amount of his doses since he was taking 2mg BID (instead of 6mg BID) for two doses upon discharge.  He is taking appropriate dose now and will still plan to finish on 9/28.  5.  Continue bactrim for PCP ppx through out therapy.  6. Will order TPMT testing on Day 15.  7. Plan to check Vitamin D level at the end of Induction.  8. Given Lazaro's marrow was negative at diagnosis he will not require a repeat marrow in the future.  9. Plan to repeat imaging with neck, chest, abdomen, pelvis CT at the end of Induction.  10. RTC on Tuesday for Day 15 therapy, sooner with concerns.   11.  Lazaro's visits are considered urgent given his need for cancer treatment.  12.  TPA to PICC:  Tried one dose today with no blood return.  Pt would prefer to try second dose tomorrow.  If line continues to not work, then would pull PICC and use PIV, peripheral stick for chemotherapy until port can be placed on 10/1/19.    Mila Montague MSN, APRN, CPNP-AC, CPON  Department of Pediatrics  Division of Hematology/Oncology

## 2019-09-16 NOTE — PROGRESS NOTES
Lazaro was seen in clinic for labs. No blood return noted on PICC line. TPA indwelling over an hour. Stil no blood return noted. Patient opted to go home without recommended second dose of TPA. Team notified and Mila romeo with this plan since patient will return to clinic tomorrow. Therefore line Heparin locked and patient left in stable condition.

## 2019-09-17 ENCOUNTER — OFFICE VISIT (OUTPATIENT)
Dept: PEDIATRIC HEMATOLOGY/ONCOLOGY | Facility: CLINIC | Age: 19
End: 2019-09-17

## 2019-09-17 ENCOUNTER — OFFICE VISIT (OUTPATIENT)
Dept: PEDIATRIC HEMATOLOGY/ONCOLOGY | Facility: CLINIC | Age: 19
End: 2019-09-17
Attending: NURSE PRACTITIONER
Payer: COMMERCIAL

## 2019-09-17 ENCOUNTER — INFUSION THERAPY VISIT (OUTPATIENT)
Dept: INFUSION THERAPY | Facility: CLINIC | Age: 19
End: 2019-09-17
Attending: NURSE PRACTITIONER
Payer: COMMERCIAL

## 2019-09-17 VITALS
HEART RATE: 65 BPM | BODY MASS INDEX: 21.42 KG/M2 | OXYGEN SATURATION: 99 % | DIASTOLIC BLOOD PRESSURE: 65 MMHG | RESPIRATION RATE: 18 BRPM | TEMPERATURE: 98.2 F | HEIGHT: 73 IN | WEIGHT: 161.6 LBS | SYSTOLIC BLOOD PRESSURE: 122 MMHG

## 2019-09-17 DIAGNOSIS — C83.50 T LYMPHOBLASTIC LYMPHOMA (H): Primary | ICD-10-CM

## 2019-09-17 DIAGNOSIS — C95.00 ACUTE LEUKEMIA NOT HAVING ACHIEVED REMISSION (H): ICD-10-CM

## 2019-09-17 DIAGNOSIS — C85.90 LYMPHOMA, UNSPECIFIED BODY REGION, UNSPECIFIED LYMPHOMA TYPE (H): ICD-10-CM

## 2019-09-17 DIAGNOSIS — Z71.9 ENCOUNTER FOR COUNSELING: Primary | ICD-10-CM

## 2019-09-17 LAB
ABO + RH BLD: NORMAL
ABO + RH BLD: NORMAL
ANION GAP SERPL CALCULATED.3IONS-SCNC: 8 MMOL/L (ref 3–14)
BLD GP AB SCN SERPL QL: NORMAL
BLD PROD TYP BPU: NORMAL
BLOOD BANK CMNT PATIENT-IMP: NORMAL
BUN SERPL-MCNC: 30 MG/DL (ref 7–30)
CALCIUM SERPL-MCNC: 7.3 MG/DL (ref 8.5–10.1)
CHLORIDE SERPL-SCNC: 103 MMOL/L (ref 94–109)
CO2 SERPL-SCNC: 25 MMOL/L (ref 20–32)
CREAT SERPL-MCNC: 0.65 MG/DL (ref 0.66–1.25)
DIFFERENTIAL METHOD BLD: ABNORMAL
ERYTHROCYTE [DISTWIDTH] IN BLOOD BY AUTOMATED COUNT: 13.2 % (ref 10–15)
GFR SERPL CREATININE-BSD FRML MDRD: >90 ML/MIN/{1.73_M2}
GLUCOSE SERPL-MCNC: 99 MG/DL (ref 70–99)
HCT VFR BLD AUTO: 41 % (ref 40–53)
HGB BLD-MCNC: 13.2 G/DL (ref 13.3–17.7)
LYMPHOCYTES # BLD AUTO: 0.9 10E9/L (ref 0.8–5.3)
LYMPHOCYTES NFR BLD AUTO: 18 %
MCH RBC QN AUTO: 26.6 PG (ref 26.5–33)
MCHC RBC AUTO-ENTMCNC: 32.2 G/DL (ref 31.5–36.5)
MCV RBC AUTO: 83 FL (ref 78–100)
METAMYELOCYTES # BLD: 0.2 10E9/L
METAMYELOCYTES NFR BLD MANUAL: 3 %
MONOCYTES # BLD AUTO: 0.2 10E9/L (ref 0–1.3)
MONOCYTES NFR BLD AUTO: 4 %
NEUTROPHILS # BLD AUTO: 3.8 10E9/L (ref 1.6–8.3)
NEUTROPHILS NFR BLD AUTO: 75 %
NUM BPU REQUESTED: 2
PLATELET # BLD AUTO: 161 10E9/L (ref 150–450)
POTASSIUM SERPL-SCNC: 4 MMOL/L (ref 3.4–5.3)
RBC # BLD AUTO: 4.97 10E12/L (ref 4.4–5.9)
SODIUM SERPL-SCNC: 136 MMOL/L (ref 133–144)
SPECIMEN EXP DATE BLD: NORMAL
WBC # BLD AUTO: 5.1 10E9/L (ref 4–11)

## 2019-09-17 PROCEDURE — 25000128 H RX IP 250 OP 636: Mod: ZF

## 2019-09-17 PROCEDURE — 96413 CHEMO IV INFUSION 1 HR: CPT

## 2019-09-17 PROCEDURE — 86900 BLOOD TYPING SEROLOGIC ABO: CPT | Performed by: NURSE PRACTITIONER

## 2019-09-17 PROCEDURE — 36592 COLLECT BLOOD FROM PICC: CPT | Performed by: STUDENT IN AN ORGANIZED HEALTH CARE EDUCATION/TRAINING PROGRAM

## 2019-09-17 PROCEDURE — 80048 BASIC METABOLIC PNL TOTAL CA: CPT | Performed by: STUDENT IN AN ORGANIZED HEALTH CARE EDUCATION/TRAINING PROGRAM

## 2019-09-17 PROCEDURE — 85025 COMPLETE CBC W/AUTO DIFF WBC: CPT | Performed by: STUDENT IN AN ORGANIZED HEALTH CARE EDUCATION/TRAINING PROGRAM

## 2019-09-17 PROCEDURE — 25000128 H RX IP 250 OP 636: Mod: ZF | Performed by: STUDENT IN AN ORGANIZED HEALTH CARE EDUCATION/TRAINING PROGRAM

## 2019-09-17 PROCEDURE — 86901 BLOOD TYPING SEROLOGIC RH(D): CPT | Performed by: NURSE PRACTITIONER

## 2019-09-17 PROCEDURE — 25000128 H RX IP 250 OP 636: Mod: ZF | Performed by: NURSE PRACTITIONER

## 2019-09-17 PROCEDURE — 86923 COMPATIBILITY TEST ELECTRIC: CPT | Performed by: NURSE PRACTITIONER

## 2019-09-17 PROCEDURE — 25800030 ZZH RX IP 258 OP 636: Mod: ZF | Performed by: STUDENT IN AN ORGANIZED HEALTH CARE EDUCATION/TRAINING PROGRAM

## 2019-09-17 PROCEDURE — 86850 RBC ANTIBODY SCREEN: CPT | Performed by: NURSE PRACTITIONER

## 2019-09-17 PROCEDURE — 82657 ENZYME CELL ACTIVITY: CPT | Performed by: STUDENT IN AN ORGANIZED HEALTH CARE EDUCATION/TRAINING PROGRAM

## 2019-09-17 PROCEDURE — 96375 TX/PRO/DX INJ NEW DRUG ADDON: CPT

## 2019-09-17 PROCEDURE — 96417 CHEMO IV INFUS EACH ADDL SEQ: CPT

## 2019-09-17 RX ORDER — HEPARIN SODIUM,PORCINE 10 UNIT/ML
2-4 VIAL (ML) INTRAVENOUS EVERY 24 HOURS
Status: DISCONTINUED | OUTPATIENT
Start: 2019-09-17 | End: 2019-09-17 | Stop reason: HOSPADM

## 2019-09-17 RX ORDER — HEPARIN SODIUM,PORCINE 10 UNIT/ML
VIAL (ML) INTRAVENOUS
Status: COMPLETED
Start: 2019-09-17 | End: 2019-09-17

## 2019-09-17 RX ORDER — ONDANSETRON 2 MG/ML
INJECTION INTRAMUSCULAR; INTRAVENOUS
Status: COMPLETED
Start: 2019-09-17 | End: 2019-09-17

## 2019-09-17 RX ORDER — ONDANSETRON 2 MG/ML
8 INJECTION INTRAMUSCULAR; INTRAVENOUS EVERY 6 HOURS
Status: DISCONTINUED | OUTPATIENT
Start: 2019-09-17 | End: 2019-09-17 | Stop reason: HOSPADM

## 2019-09-17 RX ORDER — OXYCODONE HYDROCHLORIDE 5 MG/1
5 TABLET ORAL EVERY 6 HOURS PRN
Qty: 10 TABLET | Refills: 0 | Status: SHIPPED | OUTPATIENT
Start: 2019-09-17 | End: 2019-09-18

## 2019-09-17 RX ADMIN — ONDANSETRON 8 MG: 2 INJECTION INTRAMUSCULAR; INTRAVENOUS at 12:39

## 2019-09-17 RX ADMIN — DAUNORUBICIN HYDROCHLORIDE 50 MG: 5 INJECTION, SOLUTION INTRAVENOUS at 14:16

## 2019-09-17 RX ADMIN — Medication 4 ML: at 14:38

## 2019-09-17 RX ADMIN — Medication 4 ML: at 12:16

## 2019-09-17 RX ADMIN — SODIUM CHLORIDE 100 ML: 9 INJECTION, SOLUTION INTRAVENOUS at 13:59

## 2019-09-17 RX ADMIN — SODIUM CHLORIDE, PRESERVATIVE FREE 4 ML: 5 INJECTION INTRAVENOUS at 14:38

## 2019-09-17 RX ADMIN — VINCRISTINE SULFATE 2 MG: 1 INJECTION, SOLUTION INTRAVENOUS at 14:00

## 2019-09-17 ASSESSMENT — MIFFLIN-ST. JEOR: SCORE: 1787.38

## 2019-09-17 NOTE — LETTER
9/17/2019      RE: Lazaro Lund  [address] [address]       [facility]  PEDIATRIC HEMATOLOGY/ONCOLOGY   SOCIAL WORK PROGRESS NOTE      DATA:     Lazaro is a 19 year old young man with newly diagnosed T-cell lymphoblastic lymphoma. He is currently Day 15 of Induction therapy per [id number]. SW met supportively with Lazaro to check-in. Lazaro noted he is feeling much better this week than he did last week. He explained that he moved in with his Mom, Carmen in [place] over the weekend. He feels this will be a better fit for him as he goes through treatment than Dad's place with three children in the home. He completed his [facility] application over the weekend. He signed and WANDY so financial counseling can check the status of the application as needed. We talked a bit about how Lazaro is coping with his diagnosis and treatment thus far. He denied any h/o diagnosed depression or anxiety. He endorsed having some social anxiety in the past however noted this has not impacted his functioning. Lazaro feels like he is coping well thus far and feels well supported by his parent, grandparents and friends. He denied any immediate needs/concerns at time of our visit.     INTERVENTION:     1. Supportive counseling. Check-in.  2. Release of Information signed for [facility] and [facility].     ASSESSMENT:     Lazaro was engaged in conversation and appears to be coping within normal limits. He notes feeling well supported. Grandparent is picking him up from appointment today. He is open to and appreciative of ongoing therapeutic support, advocacy, and connection with resources.     PLAN:     Full psychosocial assessment to follow. Social work will continue to assess needs and provide ongoing psychosocial support and access to resources.      Ermelinda Aquino, MSW, LICSW, OSW-C  Clinical    Pediatric Hematology Oncology   [facility]  [facility]   Monday-Thursday   Phone: [phone]  Pager: [phone]    NO LETTER                CHEY Alcantara

## 2019-09-17 NOTE — LETTER
9/17/2019      RE: Lazaro Lund  [id number] [facility] [id number]       Lazaro Lund is a 19 year old young man with newly diagnosed T-cell lymphoblastic lymphoma. Lazaro presented with acute onset cough and was found to have an anterior mediastinal mass and malignant left-sided pleural effusion.  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  He was admitted to [facility] oncology service 8/30 and started on treatment per [id number].  He had a pleural effusion that required a chest tube and a pericardial effusion that was not drained. His Induction was complicated by cardiac compression secondary to his mass leading to tamponade, this improved through out his hospital stay.  He also had dysphagia that improved with treatment of his mass, swallow study was normal.    Lazaro comes to clinic today for Day 15 of Induction therapy. Lazaro was seen in clinic yesterday for new onset purpura at his PICC site.  Lazaro reports that the site has been stable since yesterday.  He does not have any bleeding.  No petechiae or purpura at other sites.      Lazaro's headache has nearly resolved.  He notes some tightness between his shoulder blades that can trigger a headache at times.  But, overall it has improved significantly. He is not having any dizziness.  No double vision but notes his distance vision has become slightly blurry.    Lazaro's energy is OK.  He has been enjoying cooking and hopes to make eggplant lasagna tonight.  His appetite has been very strong, he is resisting the urge to get up and eat at night. Stools are soft, taking senna daily but not needing miralax.  No fevers.  Denies paresthesias, getting around without difficulty. No dysphagia.  Feels his lymph nodes/masses have completely resolved.  He is having a hard time falling asleep, taking benadryl and melatonin at bedtime which has been helpful.    Review of systems:  Remainder of  ROS is complete an negative    PMH:   Past Medical History:   Diagnosis Date     Migraine 2006    have resolved       PFMH:   Family History   Problem Relation Age of Onset     No Known Problems Mother      No Known Problems Father      Asthma Brother      Thyroid Cancer Paternal Grandmother      Melanoma Paternal Aunt        Social History: Lazaro previously lived at home with his dad and step mom in [place] but is now staying with his mom in [place].  He was working full time at [employer] in [place] prior to his diagnosis.    Current Medications:  Current Outpatient Medications   Medication Sig Dispense Refill     dexamethasone (DECADRON) 6 MG tablet Take 1 tablet (6 mg) by mouth every 12 hours for 19 days 38 tablet 0     diphenhydrAMINE (BENADRYL) 25 MG capsule Take 1-2 capsules (25-50 mg) by mouth every 6 hours as needed (Breakthrough Nausea and Vomiting ) 30 capsule 1     melatonin 3 MG tablet Take 1 tablet (3 mg) by mouth At Bedtime 30 tablet 1     NO ACTIVE MEDICATIONS .       ondansetron (ZOFRAN) 8 MG tablet Take 1 tablet (8 mg) by mouth every 6 hours as needed for nausea 30 tablet 1     oxyCODONE (ROXICODONE) 5 MG tablet Take 1 tablet (5 mg) by mouth every 6 hours as needed for moderate to severe pain 10 tablet 0     pantoprazole (PROTONIX) 40 MG EC tablet Take 1 tablet (40 mg) by mouth daily for 20 days . This acid blocker helps prevent stomach pain while on your decadron chemotherapy. 20 tablet 0     polyethylene glycol (MIRALAX/GLYCOLAX) packet Take 17 g by mouth daily 30 packet 0     sennosides (SENOKOT) 8.6 MG tablet Take 2 tablets by mouth 2 times daily as needed for constipation 60 each 1     sulfamethoxazole-trimethoprim (BACTRIM DS/SEPTRA DS) 800-160 MG tablet Take 1 tablet by mouth Every Mon, Tues two times daily 16 tablet 0   Above medications reviewed, no missed doses of dexamethasone.     Physical Exam:   Temp:  [98  F (36.7  C)-98.4  F (36.9  C)] 98  F (36.7  C)  Pulse:  [72-74] 72  Resp:   [20] 20  BP: (115-131)/(68-72) 131/68  SpO2:  [98 %-100 %] 98 %  Wt Readings from Last 4 Encounters:   09/17/19 73.3 kg (161 lb 9.6 oz)   09/16/19 74.8 kg (164 lb 14.5 oz)   09/08/19 75.4 kg (166 lb 3.6 oz)   08/22/13 66.4 kg (146 lb 6.4 oz) (77 %)*     * Growth percentiles are based on [facility] (Boys, 2-20 Years) data.     Weight at diagnosis was 75.2kg, Lazaro considers his baseline weight to be 173-175lbs before he initially got sick    General: Lazaro appears well, he is in good spirits and asks good questions.   HEENT: Skull is atrauamatic and normocephalic. PERRLA, sclera are non icteric and not injected, EOM are intact. Slight disconjugate gaze.  Nares are patent without drainage.  Oropharynx is clear without exudate, erythema or lesions.  Tympanic membranes are opaque bilaterally with light reflex and landmarks present.  Lymph:  Neck is supple without lymphadenopathy.  There is no supraclavicular, axillary or inguinal lymphadenopathy palpated.  Cardiovascular:  HR is regular, S1, S2 no murmur.  Capillary refill is < 2 seconds.  There is no edema.  Right arm is not swollen, no collaterals noted.  Respiratory: Respirations are easy.  Lungs are clear to auscultation through out.  No crackles or wheezes.  Gastrointestinal:  BS present in all quadrants.  Abdomen is soft and non-tender.  No hepatosplenomegaly or masses are palpated.  Skin: Purpura underneath PICC dressing on right arm.  Scattered maculopapular lesions on chest and back, no petechiae noted.   Neurological:  CN 2-12 tested and intact with exception of mildly disconjugate gaze. Gait is normal.  No issues with balance. Sensation intact in hands and feet.    Musculoskeletal:  Good strength and ROM in all extremities.  Strong dorsiflexion at ankles and great toes (5/5) bilaterally without any pain at the Achilles.    Labs:   Results for orders placed or performed in visit on 09/17/19 (from the past 24 hour(s))   CBC with platelets differential   Result Value  Ref Range    WBC 5.1 4.0 - 11.0 10e9/L    RBC Count 4.97 4.4 - 5.9 10e12/L    Hemoglobin 13.2 (L) 13.3 - 17.7 g/dL    Hematocrit 41.0 40.0 - 53.0 %    MCV 83 78 - 100 fl    MCH 26.6 26.5 - 33.0 pg    MCHC 32.2 31.5 - 36.5 g/dL    RDW 13.2 10.0 - 15.0 %    Platelet Count 161 150 - 450 10e9/L    Diff Method Automated Method     % Neutrophils 75.0 %    % Lymphocytes 18.0 %    % Monocytes 4.0 %    % Metamyelocytes 3.0 %    Absolute Neutrophil 3.8 1.6 - 8.3 10e9/L    Absolute Lymphocytes 0.9 0.8 - 5.3 10e9/L    Absolute Monocytes 0.2 0.0 - 1.3 10e9/L    Absolute Metamyelocytes 0.2 (H) 0 10e9/L   Basic metabolic panel   Result Value Ref Range    Sodium 136 133 - 144 mmol/L    Potassium 4.0 3.4 - 5.3 mmol/L    Chloride 103 94 - 109 mmol/L    Carbon Dioxide 25 20 - 32 mmol/L    Anion Gap 8 3 - 14 mmol/L    Glucose 99 70 - 99 mg/dL    Urea Nitrogen 30 7 - 30 mg/dL    Creatinine 0.65 (L) 0.66 - 1.25 mg/dL    GFR Estimate >90 >60 mL/min/[1.73_m2]    GFR Estimate If Black >90 >60 mL/min/[1.73_m2]    Calcium 7.3 (L) 8.5 - 10.1 mg/dL   ABO/Rh type and screen   Result Value Ref Range    ABO PENDING     Antibody Screen PENDING     Test Valid Only At          [facility]    Specimen Expires 09/20/2019        Assessment:  Lazaro Lund is a 19 year old young man with newly diagnosed T Cell lymphoblastic lymphoma (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  He is being treated per COG protocol [id number] and comes to clinic today for Day 15 of Induction therapy.  His presentation was complicated by a pleural and pericardial effusion with mass effect leading to cardiac tamponade.  Fortunately as his mass has responded to treatment these have improved.  His therapy is being modified to include dexamethasone (instead of prednisone) which is now considered standard of care.  He had a spinal headache which is now mostly resolved.  Mild blurry distance vision.  He has new purpura under his PICC dressing, no other  purpura, petechiae or bleeding.  His coags are abnormal which is expected given his recent treatment with PEG asparaginase.  Calcium is also low likely secondary to low albumin from PEG. His blood counts look good.  Blood pressure and blood sugar also look good.  Mild folliculitis.    Plan:   1. Reviewed labs with Lazaro and his mom.  Discussed that laboratory coagulopathy is common with PEG Asparaginase but does not require treatment unless it leads to bleeding or thrombosis.  His purpura has not progressed and he is not having any bleeding.  Will closely monitor and he will call if symptoms develop.  Would consider giving cryoprecipitate if that is the case.  2. Given significant spinal headache will plan for IV caffeine following LPs in the future.   3. Cariology follow up next month given presenting cardiac tamponade and pericardial effusion along with recent abnormal EKG.  4. Lazaro asked many good questions including the rationale for ongoing CNS directed therapy and what the next phases of treatment look like.  Provided a calendar for Consolidation and discussed in detail including medication hand outs, will continue to reiterate at future visits.  5. Continue dexamethasone until 9/28 AM.  Lazaro missed a small amount of his doses since he was taking 2mg BID (instead of 6mg BID) for two doses upon discharge.  He is taking appropriate dose now and will still plan to finish on 9/28.  6. Lazaro has my card, reviewed fever criteria and need to seek immediate care with fever.  Also discussed other s/sx to monitor for and reasons to call.  7. Anticipatory guidance regarding steroid side effects as the month goes on including effects on mood, mobility and eating habits. Also reiterated vincristine side effects and combined potential effect on mobility.  8. Continue bactrim for PCP ppx through out therapy.  9. TPMT testing pending from today.  10. Plan to check Vitamin D level at the end of Induction.  11. Given  Lazaro's marrow was negative at diagnosis he will not require a repeat marrow in the future.  12. Plan to repeat imaging with neck, chest, abdomen, pelvis CT at the end of Induction.  He will also have his PICC removed and port placed at that time.  13. Mild blurry vision likely secondary to steroids.  Lazaro will call if he develops double vision or any other visual changes.  14. Lazaro requested a refill of oxycodone as this is working best for his headaches.  Encouraged him to try non-opiod measures initially.  If needed try 1/2 tab of oxycodone along with tylenol.  He was very receptive to this.  Also discussed safe storage of his opiods, will provide 10 tabs.  15. RTC on Thursday for labs and exam, sooner with concerns.       YOHAN Dunn CNP

## 2019-09-17 NOTE — PROGRESS NOTES
Pt. Came to [facility] for D15C1 Vincristine/Daunorubicin and possible transfusions.  Labs attempted to be drawn by [facility] lab, but unable to get full amount d/t continued PICC sluggishness.  After repositioning and flushing, able to get good blood return from both lumens and rest of labs drawn as ordered.  Pt. Seen and assessed by Luana Van, ANGELY and brooke for treatment today.  Premedication of IV Zofran given.  Vincristine given by gravity with blood return pre/mid/post.  Daunorubicin given by gravity with good blood return pre/mid/post.  VSS.  Pt. Left in stable condition with grandma after chemotherapies complete. Pt. Had no further questions or concerns.

## 2019-09-18 RX ORDER — OXYCODONE HYDROCHLORIDE 5 MG/1
5 TABLET ORAL EVERY 6 HOURS PRN
Qty: 10 TABLET | Refills: 0 | Status: ON HOLD | OUTPATIENT
Start: 2019-09-18 | End: 2019-10-17

## 2019-09-19 ENCOUNTER — OFFICE VISIT (OUTPATIENT)
Dept: PEDIATRIC HEMATOLOGY/ONCOLOGY | Facility: CLINIC | Age: 19
End: 2019-09-19
Attending: PEDIATRICS
Payer: COMMERCIAL

## 2019-09-19 ENCOUNTER — INFUSION THERAPY VISIT (OUTPATIENT)
Dept: INFUSION THERAPY | Facility: CLINIC | Age: 19
End: 2019-09-19
Attending: PEDIATRICS
Payer: COMMERCIAL

## 2019-09-19 VITALS
TEMPERATURE: 97.5 F | HEART RATE: 67 BPM | SYSTOLIC BLOOD PRESSURE: 122 MMHG | HEIGHT: 72 IN | OXYGEN SATURATION: 100 % | RESPIRATION RATE: 16 BRPM | DIASTOLIC BLOOD PRESSURE: 67 MMHG | BODY MASS INDEX: 21.59 KG/M2 | WEIGHT: 159.39 LBS

## 2019-09-19 DIAGNOSIS — C83.50 T LYMPHOBLASTIC LYMPHOMA (H): Primary | ICD-10-CM

## 2019-09-19 DIAGNOSIS — C83.50 T LYMPHOBLASTIC LYMPHOMA (H): ICD-10-CM

## 2019-09-19 LAB
ABO + RH BLD: NORMAL
ABO + RH BLD: NORMAL
BASOPHILS # BLD AUTO: 0 10E9/L (ref 0–0.2)
BASOPHILS NFR BLD AUTO: 0.1 %
BLD GP AB SCN SERPL QL: NORMAL
BLOOD BANK CMNT PATIENT-IMP: NORMAL
DIFFERENTIAL METHOD BLD: NORMAL
EOSINOPHIL # BLD AUTO: 0 10E9/L (ref 0–0.7)
EOSINOPHIL NFR BLD AUTO: 0.1 %
ERYTHROCYTE [DISTWIDTH] IN BLOOD BY AUTOMATED COUNT: 13.1 % (ref 10–15)
HCT VFR BLD AUTO: 41.8 % (ref 40–53)
HGB BLD-MCNC: 13.6 G/DL (ref 13.3–17.7)
IMM GRANULOCYTES # BLD: 0.1 10E9/L (ref 0–0.4)
IMM GRANULOCYTES NFR BLD: 1.8 %
LYMPHOCYTES # BLD AUTO: 1 10E9/L (ref 0.8–5.3)
LYMPHOCYTES NFR BLD AUTO: 13.7 %
MCH RBC QN AUTO: 26.5 PG (ref 26.5–33)
MCHC RBC AUTO-ENTMCNC: 32.5 G/DL (ref 31.5–36.5)
MCV RBC AUTO: 81 FL (ref 78–100)
MONOCYTES # BLD AUTO: 0.5 10E9/L (ref 0–1.3)
MONOCYTES NFR BLD AUTO: 7.6 %
NEUTROPHILS # BLD AUTO: 5.4 10E9/L (ref 1.6–8.3)
NEUTROPHILS NFR BLD AUTO: 76.7 %
NRBC # BLD AUTO: 0 10*3/UL
NRBC BLD AUTO-RTO: 0 /100
PLATELET # BLD AUTO: 184 10E9/L (ref 150–450)
RBC # BLD AUTO: 5.14 10E12/L (ref 4.4–5.9)
SPECIMEN EXP DATE BLD: NORMAL
WBC # BLD AUTO: 7.1 10E9/L (ref 4–11)

## 2019-09-19 PROCEDURE — 86900 BLOOD TYPING SEROLOGIC ABO: CPT | Performed by: NURSE PRACTITIONER

## 2019-09-19 PROCEDURE — 86850 RBC ANTIBODY SCREEN: CPT | Performed by: NURSE PRACTITIONER

## 2019-09-19 PROCEDURE — 86901 BLOOD TYPING SEROLOGIC RH(D): CPT | Performed by: NURSE PRACTITIONER

## 2019-09-19 PROCEDURE — 36415 COLL VENOUS BLD VENIPUNCTURE: CPT | Performed by: NURSE PRACTITIONER

## 2019-09-19 PROCEDURE — 85025 COMPLETE CBC W/AUTO DIFF WBC: CPT | Performed by: NURSE PRACTITIONER

## 2019-09-19 ASSESSMENT — MIFFLIN-ST. JEOR: SCORE: 1773

## 2019-09-19 ASSESSMENT — PAIN SCALES - GENERAL: PAINLEVEL: MODERATE PAIN (5)

## 2019-09-19 NOTE — PATIENT INSTRUCTIONS
Side effects of current chemotherapy:  Steroid side effects as the month goes on include effects on mood, mobility, and eating habits. Try to snack on healthy foods when feeling hungry, such as fruits and vegetables.  Vincristine: Alert your provider if you notice problems with your mobility, problems with constipation, or problems with tingling in your hands or feet    Treatment plan  1) Continue dexamethasone 6mg twice daily through 9/28 morning.  2) Next infusion with vincristine and daunorubicin is on 9/24/2019  3) Continue bactrim for pneumonia prevention

## 2019-09-19 NOTE — PROGRESS NOTES
Lazaro Lund is a 19 year old young man T-cell lymphoblastic lymphoma, pratt stage III. Lazaro presented with chronic cough with progressive orthopnea and was found to have an anterior mediastinal mass and malignant left-sided pleural effusion.  A CT guided biopsy was obtained at [facility] and pathology was consistent with T-cell neoplasm.  He was admitted to [facility] oncology service 8/30 and started on treatment per [id number].  He had a pleural effusion that required a chest tube and a pericardial effusion that was not drained. His Induction was complicated by cardiac compression secondary to his mass leading to tamponade, this improved through out his hospital stay.  He also had dysphagia that improved with treatment of his mass, swallow study was normal.    Lazaro comes to clinic today for Day 17 of Induction therapy. He tolerated day 15 vinc/daun very well with no nausea yesterday. He is noticing with his dexamethasone that his face is more puffy. He has a strong appetite and we discussed healthy eating habits while on steroids. He tries each day to perform leg exercises such as squats and also goes on walks. He continues to have neck pain in the upper thoracic region when sitting for prolonged periods, but not as much as before. This can sometimes lead to a headache, but not as bad as the lumbar puncture he was experiencing before. He continues to have symptoms of blurry vision, but this is mild.     Review of systems:  Comprehensive review of system is otherwise negative except per interim history and HPI.     PMH:   Past Medical History:   Diagnosis Date     Migraine 2006    have resolved     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       PF:   Family History   Problem Relation Age of Onset     No Known Problems Mother      No Known Problems Father      Asthma Brother      Thyroid Cancer Paternal Grandmother      Melanoma Paternal Aunt        Social History: Lazaro previously lived at home with his dad  and step mom in [place] but is now staying with his mom in [place].  He was working full time at [employer] in [place] prior to his diagnosis. 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 He has finished high school.     Current Medications:  Current Outpatient Medications   Medication Sig Dispense Refill     dexamethasone (DECADRON) 6 MG tablet Take 1 tablet (6 mg) by mouth every 12 hours for 19 days 38 tablet 0     diphenhydrAMINE (BENADRYL) 25 MG capsule Take 1-2 capsules (25-50 mg) by mouth every 6 hours as needed (Breakthrough Nausea and Vomiting ) 30 capsule 1     melatonin 3 MG tablet Take 1 tablet (3 mg) by mouth At Bedtime 30 tablet 1     NO ACTIVE MEDICATIONS .       ondansetron (ZOFRAN) 8 MG tablet Take 1 tablet (8 mg) by mouth every 6 hours as needed for nausea 30 tablet 1     oxyCODONE (ROXICODONE) 5 MG tablet Take 1 tablet (5 mg) by mouth every 6 hours as needed for moderate to severe pain 10 tablet 0     pantoprazole (PROTONIX) 40 MG EC tablet Take 1 tablet (40 mg) by mouth daily for 20 days . This acid blocker helps prevent stomach pain while on your decadron chemotherapy. 20 tablet 0     polyethylene glycol (MIRALAX/GLYCOLAX) packet Take 17 g by mouth daily 30 packet 0     sennosides (SENOKOT) 8.6 MG tablet Take 2 tablets by mouth 2 times daily as needed for constipation 60 each 1     sulfamethoxazole-trimethoprim (BACTRIM DS/SEPTRA DS) 800-160 MG tablet Take 1 tablet by mouth Every Mon, Tues two times daily 16 tablet 0   Above medications reviewed, no missed doses of dexamethasone.     Physical Exam:   Temp:  [97.5  F (36.4  C)] 97.5  F (36.4  C)  Pulse:  [67] 67  Resp:  [16] 16  BP: (122)/(67) 122/67  SpO2:  [100 %] 100 %  Wt Readings from Last 4 Encounters:   09/19/19 72.3 kg (159 lb 6.3 oz)   09/17/19 73.3 kg (161 lb 9.6 oz)   09/16/19 74.8 kg (164 lb 14.5 oz)   09/08/19 75.4 kg (166 lb 3.6 oz)     Weight at diagnosis was 75.2kg, Lazaro considers his baseline weight to be  173-175lbs before he initially got sick    General: Lazaro appears well, he is in good spirits and asks good questions.   HEENT: Skull is atrauamatic and normocephalic. PERRLA, sclera are non icteric and not injected, EOM are intact. Slight disconjugate gaze.  Nares are patent without drainage.  Oropharynx is clear without exudate, erythema or lesions.  Tympanic membranes are opaque bilaterally with light reflex and landmarks present.  Lymph:  Neck is supple without lymphadenopathy.  There is no supraclavicular, axillary or inguinal lymphadenopathy palpated.  Cardiovascular:  HR is regular, S1, S2 no murmur.  Capillary refill is < 2 seconds.  There is no edema.  Right arm is not swollen, no collaterals noted.  Respiratory: Respirations are easy.  Lungs are clear to auscultation through out.  No crackles or wheezes.  Gastrointestinal:  BS present in all quadrants.  Abdomen is soft and non-tender.  No hepatosplenomegaly or masses are palpated.  Skin: Purpura underneath PICC dressing on right arm.    Neurological:  CN 2-12 tested and intact with exception of mildly disconjugate gaze. Gait is normal.  No issues with balance. Sensation intact in hands and feet.    Musculoskeletal:  Good strength and ROM in all extremities.  Strong dorsiflexion at ankles and great toes (5/5) bilaterally without any pain at the Achilles.    Labs:   Results for orders placed or performed in visit on 09/19/19 (from the past 24 hour(s))   CBC with platelets differential   Result Value Ref Range    WBC 7.1 4.0 - 11.0 10e9/L    RBC Count 5.14 4.4 - 5.9 10e12/L    Hemoglobin 13.6 13.3 - 17.7 g/dL    Hematocrit 41.8 40.0 - 53.0 %    MCV 81 78 - 100 fl    MCH 26.5 26.5 - 33.0 pg    MCHC 32.5 31.5 - 36.5 g/dL    RDW 13.1 10.0 - 15.0 %    Platelet Count 184 150 - 450 10e9/L    Diff Method Automated Method     % Neutrophils 76.7 %    % Lymphocytes 13.7 %    % Monocytes 7.6 %    % Eosinophils 0.1 %    % Basophils 0.1 %    % Immature Granulocytes 1.8 %     Nucleated RBCs 0 0 /100    Absolute Neutrophil 5.4 1.6 - 8.3 10e9/L    Absolute Lymphocytes 1.0 0.8 - 5.3 10e9/L    Absolute Monocytes 0.5 0.0 - 1.3 10e9/L    Absolute Eosinophils 0.0 0.0 - 0.7 10e9/L    Absolute Basophils 0.0 0.0 - 0.2 10e9/L    Abs Immature Granulocytes 0.1 0 - 0.4 10e9/L    Absolute Nucleated RBC 0.0    ABO/Rh type and screen   Result Value Ref Range    ABO PENDING     Antibody Screen PENDING     Test Valid Only At          [facility]    Specimen Expires 09/22/2019        Assessment:  Lazaro Lund is a 19 year old young man with newly diagnosed T Cell lymphoblastic lymphoma (marrow and CNS negative), stage III.      He is being treated per COG protocol [id number] and comes to clinic today for Day 17 of Induction therapy.  His therapy is being modified to include dexamethasone (instead of prednisone) which is now considered standard of care.      Following his induction IT treatments, he developed spinal headache. He also developed purpura under his PICC dressing, no other purpura, petechiae or bleeding.  His coags are abnormal which is expected given his recent treatment with PEG asparaginase. His blurry vision is attributable to steroids.    Plan:   1. Given significant spinal headache will plan for IV caffeine following LPs in the future.   2. Cardiology follow up next month given presenting cardiac tamponade and pericardial effusion along with recent abnormal EKG.  3. Continue dexamethasone until 9/28 AM.    4. Continue bactrim for PCP ppx through out therapy.  5. TPMT testing showed intermediate TPMT activity which may require dose adjustment to avoid bone marrow toxicity  6. Plan to check Vitamin D level at the end of Induction.  7. Plan to repeat imaging with neck, chest, abdomen, pelvis CT at the end of Induction.  He will also have his PICC removed and port placed at that time.  8. Mild blurry vision likely secondary to steroids.  Lazaro  will call if he develops double vision or any other visual changes.  9. RTC on 9/24 for labs and exam and chemo, sooner with concerns.     Physician Attestation    I saw and evaluated the patient. I discussed with the fellow and agree with the findings and plan as documented in the fellow's note.     Reynaldo Campuzano MD  Pediatric Hematology/Oncology  [facility]    Patient Education:  Patient Instructions   Side effects of current chemotherapy:  Steroid side effects as the month goes on include effects on mood, mobility, and eating habits. Try to snack on healthy foods when feeling hungry, such as fruits and vegetables.  Vincristine: Alert your provider if you notice problems with your mobility, problems with constipation, or problems with tingling in your hands or feet    Treatment plan  1) Continue dexamethasone 6mg twice daily through 9/28 morning.  2) Next infusion with vincristine and daunorubicin is on 9/24/2019  3) Continue bactrim for pneumonia prevention

## 2019-09-19 NOTE — LETTER
9/19/2019      RE: Lazaro Lund  [id number] [facility] [id number]       Lazaro Lund is a 19 year old young man T-cell lymphoblastic lymphoma, pratt stage III. Lazaro presented with chronic cough with progressive orthopnea and was found to have an anterior mediastinal mass and malignant left-sided pleural effusion.  A CT guided biopsy was obtained at [facility] and pathology was consistent with T-cell neoplasm.  He was admitted to [facility] oncology service 8/30 and started on treatment per [id number].  He had a pleural effusion that required a chest tube and a pericardial effusion that was not drained. His Induction was complicated by cardiac compression secondary to his mass leading to tamponade, this improved through out his hospital stay.  He also had dysphagia that improved with treatment of his mass, swallow study was normal.    Lazaro comes to clinic today for Day 17 of Induction therapy. He tolerated day 15 vinc/daun very well with no nausea yesterday. He is noticing with his dexamethasone that his face is more puffy. He has a strong appetite and we discussed healthy eating habits while on steroids. He tries each day to perform leg exercises such as squats and also goes on walks. He continues to have neck pain in the upper thoracic region when sitting for prolonged periods, but not as much as before. This can sometimes lead to a headache, but not as bad as the lumbar puncture he was experiencing before. He continues to have symptoms of blurry vision, but this is mild.     Review of systems:  Comprehensive review of system is otherwise negative except per interim history and HPI.     PMH:   Past Medical History:   Diagnosis Date     Migraine 2006    have resolved     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       PF:   Family History   Problem Relation Age of Onset     No Known Problems Mother      No Known Problems Father      Asthma Brother      Thyroid Cancer Paternal Grandmother       Melanoma Paternal Aunt        Social History: Lazaro previously lived at home with his dad and step mom in [place] but is now staying with his mom in [place].  He was working full time at [employer] in [place] prior to his diagnosis. 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 He has finished high school.     Current Medications:  Current Outpatient Medications   Medication Sig Dispense Refill     dexamethasone (DECADRON) 6 MG tablet Take 1 tablet (6 mg) by mouth every 12 hours for 19 days 38 tablet 0     diphenhydrAMINE (BENADRYL) 25 MG capsule Take 1-2 capsules (25-50 mg) by mouth every 6 hours as needed (Breakthrough Nausea and Vomiting ) 30 capsule 1     melatonin 3 MG tablet Take 1 tablet (3 mg) by mouth At Bedtime 30 tablet 1     NO ACTIVE MEDICATIONS .       ondansetron (ZOFRAN) 8 MG tablet Take 1 tablet (8 mg) by mouth every 6 hours as needed for nausea 30 tablet 1     oxyCODONE (ROXICODONE) 5 MG tablet Take 1 tablet (5 mg) by mouth every 6 hours as needed for moderate to severe pain 10 tablet 0     pantoprazole (PROTONIX) 40 MG EC tablet Take 1 tablet (40 mg) by mouth daily for 20 days . This acid blocker helps prevent stomach pain while on your decadron chemotherapy. 20 tablet 0     polyethylene glycol (MIRALAX/GLYCOLAX) packet Take 17 g by mouth daily 30 packet 0     sennosides (SENOKOT) 8.6 MG tablet Take 2 tablets by mouth 2 times daily as needed for constipation 60 each 1     sulfamethoxazole-trimethoprim (BACTRIM DS/SEPTRA DS) 800-160 MG tablet Take 1 tablet by mouth Every Mon, Tues two times daily 16 tablet 0   Above medications reviewed, no missed doses of dexamethasone.     Physical Exam:   Temp:  [97.5  F (36.4  C)] 97.5  F (36.4  C)  Pulse:  [67] 67  Resp:  [16] 16  BP: (122)/(67) 122/67  SpO2:  [100 %] 100 %  Wt Readings from Last 4 Encounters:   09/19/19 72.3 kg (159 lb 6.3 oz)   09/17/19 73.3 kg (161 lb 9.6 oz)   09/16/19 74.8 kg (164 lb 14.5 oz)   09/08/19 75.4 kg (166 lb  3.6 oz)     Weight at diagnosis was 75.2kg, Lazaro considers his baseline weight to be 173-175lbs before he initially got sick    General: Lazaro appears well, he is in good spirits and asks good questions.   HEENT: Skull is atrauamatic and normocephalic. PERRLA, sclera are non icteric and not injected, EOM are intact. Slight disconjugate gaze.  Nares are patent without drainage.  Oropharynx is clear without exudate, erythema or lesions.  Tympanic membranes are opaque bilaterally with light reflex and landmarks present.  Lymph:  Neck is supple without lymphadenopathy.  There is no supraclavicular, axillary or inguinal lymphadenopathy palpated.  Cardiovascular:  HR is regular, S1, S2 no murmur.  Capillary refill is < 2 seconds.  There is no edema.  Right arm is not swollen, no collaterals noted.  Respiratory: Respirations are easy.  Lungs are clear to auscultation through out.  No crackles or wheezes.  Gastrointestinal:  BS present in all quadrants.  Abdomen is soft and non-tender.  No hepatosplenomegaly or masses are palpated.  Skin: Purpura underneath PICC dressing on right arm.    Neurological:  CN 2-12 tested and intact with exception of mildly disconjugate gaze. Gait is normal.  No issues with balance. Sensation intact in hands and feet.    Musculoskeletal:  Good strength and ROM in all extremities.  Strong dorsiflexion at ankles and great toes (5/5) bilaterally without any pain at the Achilles.    Labs:   Results for orders placed or performed in visit on 09/19/19 (from the past 24 hour(s))   CBC with platelets differential   Result Value Ref Range    WBC 7.1 4.0 - 11.0 10e9/L    RBC Count 5.14 4.4 - 5.9 10e12/L    Hemoglobin 13.6 13.3 - 17.7 g/dL    Hematocrit 41.8 40.0 - 53.0 %    MCV 81 78 - 100 fl    MCH 26.5 26.5 - 33.0 pg    MCHC 32.5 31.5 - 36.5 g/dL    RDW 13.1 10.0 - 15.0 %    Platelet Count 184 150 - 450 10e9/L    Diff Method Automated Method     % Neutrophils 76.7 %    % Lymphocytes 13.7 %    %  Monocytes 7.6 %    % Eosinophils 0.1 %    % Basophils 0.1 %    % Immature Granulocytes 1.8 %    Nucleated RBCs 0 0 /100    Absolute Neutrophil 5.4 1.6 - 8.3 10e9/L    Absolute Lymphocytes 1.0 0.8 - 5.3 10e9/L    Absolute Monocytes 0.5 0.0 - 1.3 10e9/L    Absolute Eosinophils 0.0 0.0 - 0.7 10e9/L    Absolute Basophils 0.0 0.0 - 0.2 10e9/L    Abs Immature Granulocytes 0.1 0 - 0.4 10e9/L    Absolute Nucleated RBC 0.0    ABO/Rh type and screen   Result Value Ref Range    ABO PENDING     Antibody Screen PENDING     Test Valid Only At          [facility]    Specimen Expires 09/22/2019        Assessment:  Lazaro Lund is a 19 year old young man with newly diagnosed T Cell lymphoblastic lymphoma (marrow and CNS negative), stage III.      He is being treated per [facility] [place] [id number] and comes to clinic today for Day 17 of Induction therapy.  His therapy is being modified to include dexamethasone (instead of prednisone) which is now considered standard of care.      Following his induction IT treatments, he developed spinal headache. He also developed purpura under his PICC dressing, no other purpura, petechiae or bleeding.  His coags are abnormal which is expected given his recent treatment with PEG asparaginase. His blurry vision is attributable to steroids.    Plan:   1. Given significant spinal headache will plan for IV caffeine following LPs in the future.   2. Cariology follow up next month given presenting cardiac tamponade and pericardial effusion along with recent abnormal EKG.  3. Continue dexamethasone until 9/28 AM.    4. Continue bactrim for PCP ppx through out therapy.  5. TPMT pending  6. Plan to check Vitamin D level at the end of Induction.  7. Plan to repeat imaging with neck, chest, abdomen, pelvis CT at the end of Induction.  He will also have his PICC removed and port placed at that time.  8. Mild blurry vision likely secondary to steroids.  Lazaro will  call if he develops double vision or any other visual changes.  9. RTC on 9/24 for labs and exam and chemo, sooner with concerns.     Patient Education:  Patient Instructions   Side effects of current chemotherapy:  Steroid side effects as the month goes on include effects on mood, mobility, and eating habits. Try to snack on healthy foods when feeling hungry, such as fruits and vegetables.  Vincristine: Alert your provider if you notice problems with your mobility, problems with constipation, or problems with tingling in your hands or feet    Treatment plan  1) Continue dexamethasone 6mg twice daily through 9/28 morning.  2) Next infusion with vincristine and daunorubicin is on 9/24/2019  3) Continue bactrim for pneumonia prevention        Nicho Fischer MD

## 2019-09-19 NOTE — PROGRESS NOTES
[facility]  PEDIATRIC HEMATOLOGY/ONCOLOGY   SOCIAL WORK PROGRESS NOTE      DATA:     Lazaro is a 19 year old young man with newly diagnosed T-cell lymphoblastic lymphoma. He is currently Day 15 of Induction therapy per [id number]. SW met supportively with Lazaro to check-in. Lazaro noted he is feeling much better this week than he did last week. He explained that he moved in with his Mom, Carmen in [place] over the weekend. He feels this will be a better fit for him as he goes through treatment than Dad's place with three children in the home. He completed his [employer] application over the weekend. He signed and WANDY so financial counseling can check the status of the application as needed. We talked a bit about how Lazaro is coping with his diagnosis and treatment thus far. He denied any h/o diagnosed depression or anxiety. He endorsed having some social anxiety in the past however noted this has not impacted his functioning. Lazaro feels like he is coping well thus far and feels well supported by his parent, grandparents and friends. He denied any immediate needs/concerns at time of our visit.     INTERVENTION:     1. Supportive counseling. Check-in.  2. Release of Information signed for [facility] and [facility].     ASSESSMENT:     Lazaro was engaged in conversation and appears to be coping within normal limits. He notes feeling well supported. Grandparent is picking him up from appointment today. He is open to and appreciative of ongoing therapeutic support, advocacy, and connection with resources.     PLAN:     Full psychosocial assessment to follow. Social work will continue to assess needs and provide ongoing psychosocial support and access to resources.      Ermelinda Aquino, CHEY, LICSW, OSW-C  Clinical    Pediatric Hematology Oncology   [facility]   Monday-Thursday   Phone: [phone]  Pager:  [phone]    NO LETTER

## 2019-09-19 NOTE — PROGRESS NOTES
Lazaro came to clinic today for possible trasnfusion and dressing changes. Labs drawn by [facility]. Hgb 13.6. Parameters not met for transfusion today. PICC dressing changed by AGNES Carbajal. Patient seen and assessed by Dr. Fischer while in clinic.

## 2019-09-20 LAB — TPMT BLD-CCNC: 23.5 U/ML (ref 24–44)

## 2019-09-21 LAB
BLD PROD TYP BPU: NORMAL
BLD PROD TYP BPU: NORMAL
BLD UNIT ID BPU: 0
BLD UNIT ID BPU: 0
BLOOD PRODUCT CODE: NORMAL
BLOOD PRODUCT CODE: NORMAL
BPU ID: NORMAL
BPU ID: NORMAL
TRANSFUSION STATUS PATIENT QL: NORMAL

## 2019-09-23 LAB — COPATH REPORT: NORMAL

## 2019-09-24 ENCOUNTER — INFUSION THERAPY VISIT (OUTPATIENT)
Dept: INFUSION THERAPY | Facility: CLINIC | Age: 19
End: 2019-09-24
Attending: NURSE PRACTITIONER
Payer: COMMERCIAL

## 2019-09-24 ENCOUNTER — OFFICE VISIT (OUTPATIENT)
Dept: PEDIATRIC HEMATOLOGY/ONCOLOGY | Facility: CLINIC | Age: 19
End: 2019-09-24
Attending: NURSE PRACTITIONER
Payer: COMMERCIAL

## 2019-09-24 VITALS
TEMPERATURE: 98.1 F | DIASTOLIC BLOOD PRESSURE: 71 MMHG | HEIGHT: 72 IN | BODY MASS INDEX: 23.59 KG/M2 | OXYGEN SATURATION: 98 % | WEIGHT: 174.16 LBS | HEART RATE: 88 BPM | RESPIRATION RATE: 16 BRPM | SYSTOLIC BLOOD PRESSURE: 120 MMHG

## 2019-09-24 DIAGNOSIS — C83.50 T LYMPHOBLASTIC LYMPHOMA (H): Primary | ICD-10-CM

## 2019-09-24 DIAGNOSIS — C85.90 LYMPHOMA, UNSPECIFIED BODY REGION, UNSPECIFIED LYMPHOMA TYPE (H): ICD-10-CM

## 2019-09-24 LAB
ANION GAP SERPL CALCULATED.3IONS-SCNC: 8 MMOL/L (ref 3–14)
BASOPHILS # BLD AUTO: 0 10E9/L (ref 0–0.2)
BASOPHILS NFR BLD AUTO: 0.3 %
BUN SERPL-MCNC: 33 MG/DL (ref 7–30)
CALCIUM SERPL-MCNC: 7 MG/DL (ref 8.5–10.1)
CHLORIDE SERPL-SCNC: 107 MMOL/L (ref 94–109)
CO2 SERPL-SCNC: 23 MMOL/L (ref 20–32)
CREAT SERPL-MCNC: 0.56 MG/DL (ref 0.66–1.25)
DIFFERENTIAL METHOD BLD: ABNORMAL
EOSINOPHIL # BLD AUTO: 0 10E9/L (ref 0–0.7)
EOSINOPHIL NFR BLD AUTO: 0.1 %
ERYTHROCYTE [DISTWIDTH] IN BLOOD BY AUTOMATED COUNT: 14 % (ref 10–15)
GFR SERPL CREATININE-BSD FRML MDRD: >90 ML/MIN/{1.73_M2}
GLUCOSE SERPL-MCNC: 102 MG/DL (ref 70–99)
HCT VFR BLD AUTO: 39.2 % (ref 40–53)
HGB BLD-MCNC: 12.4 G/DL (ref 13.3–17.7)
IMM GRANULOCYTES # BLD: 0.4 10E9/L (ref 0–0.4)
IMM GRANULOCYTES NFR BLD: 3.8 %
LYMPHOCYTES # BLD AUTO: 1.1 10E9/L (ref 0.8–5.3)
LYMPHOCYTES NFR BLD AUTO: 11.1 %
MCH RBC QN AUTO: 26.6 PG (ref 26.5–33)
MCHC RBC AUTO-ENTMCNC: 31.6 G/DL (ref 31.5–36.5)
MCV RBC AUTO: 84 FL (ref 78–100)
MONOCYTES # BLD AUTO: 0.3 10E9/L (ref 0–1.3)
MONOCYTES NFR BLD AUTO: 2.7 %
NEUTROPHILS # BLD AUTO: 8 10E9/L (ref 1.6–8.3)
NEUTROPHILS NFR BLD AUTO: 82 %
NRBC # BLD AUTO: 0 10*3/UL
NRBC BLD AUTO-RTO: 0 /100
PLATELET # BLD AUTO: 136 10E9/L (ref 150–450)
POTASSIUM SERPL-SCNC: 4.4 MMOL/L (ref 3.4–5.3)
RBC # BLD AUTO: 4.67 10E12/L (ref 4.4–5.9)
SODIUM SERPL-SCNC: 138 MMOL/L (ref 133–144)
WBC # BLD AUTO: 9.7 10E9/L (ref 4–11)

## 2019-09-24 PROCEDURE — 25800030 ZZH RX IP 258 OP 636: Mod: ZF | Performed by: STUDENT IN AN ORGANIZED HEALTH CARE EDUCATION/TRAINING PROGRAM

## 2019-09-24 PROCEDURE — 80048 BASIC METABOLIC PNL TOTAL CA: CPT | Performed by: STUDENT IN AN ORGANIZED HEALTH CARE EDUCATION/TRAINING PROGRAM

## 2019-09-24 PROCEDURE — 96375 TX/PRO/DX INJ NEW DRUG ADDON: CPT

## 2019-09-24 PROCEDURE — 96411 CHEMO IV PUSH ADDL DRUG: CPT

## 2019-09-24 PROCEDURE — 85025 COMPLETE CBC W/AUTO DIFF WBC: CPT | Performed by: STUDENT IN AN ORGANIZED HEALTH CARE EDUCATION/TRAINING PROGRAM

## 2019-09-24 PROCEDURE — 25000128 H RX IP 250 OP 636: Mod: ZF | Performed by: STUDENT IN AN ORGANIZED HEALTH CARE EDUCATION/TRAINING PROGRAM

## 2019-09-24 PROCEDURE — 96409 CHEMO IV PUSH SNGL DRUG: CPT

## 2019-09-24 PROCEDURE — 25000125 ZZHC RX 250: Mod: ZF

## 2019-09-24 RX ORDER — ONDANSETRON 2 MG/ML
8 INJECTION INTRAMUSCULAR; INTRAVENOUS EVERY 6 HOURS
Status: DISCONTINUED | OUTPATIENT
Start: 2019-09-24 | End: 2019-09-24 | Stop reason: HOSPADM

## 2019-09-24 RX ORDER — LIDOCAINE/PRILOCAINE 2.5 %-2.5%
CREAM (GRAM) TOPICAL PRN
Qty: 30 G | Refills: 11 | Status: ON HOLD | OUTPATIENT
Start: 2019-09-24 | End: 2019-09-29

## 2019-09-24 RX ORDER — ONDANSETRON 2 MG/ML
INJECTION INTRAMUSCULAR; INTRAVENOUS
Status: DISCONTINUED
Start: 2019-09-24 | End: 2019-09-24 | Stop reason: HOSPADM

## 2019-09-24 RX ADMIN — VINCRISTINE SULFATE 2 MG: 1 INJECTION, SOLUTION INTRAVENOUS at 13:22

## 2019-09-24 RX ADMIN — ONDANSETRON 8 MG: 2 INJECTION INTRAMUSCULAR; INTRAVENOUS at 13:14

## 2019-09-24 RX ADMIN — DAUNORUBICIN HYDROCHLORIDE 50 MG: 5 INJECTION, SOLUTION INTRAVENOUS at 13:27

## 2019-09-24 RX ADMIN — LIDOCAINE HYDROCHLORIDE: 10 INJECTION, SOLUTION EPIDURAL; INFILTRATION; INTRACAUDAL; PERINEURAL at 13:36

## 2019-09-24 ASSESSMENT — PAIN SCALES - GENERAL: PAINLEVEL: NO PAIN (0)

## 2019-09-24 ASSESSMENT — MIFFLIN-ST. JEOR: SCORE: 1838.75

## 2019-09-24 NOTE — PROGRESS NOTES
Pt. Came to [facility] for D22C1 with Vincristine/Daunorubicin. Labs attempted to be drawn from PICC line, but blood return not noted. Repositioning attempted and caps changed, without success. Notified Félix Van NP- who instructed to pull the PICC since it wasn't working well and he is getting a port placed next week. PIV placed in left hand for labs and chemo administration today. Labs drawn as ordered. PICC line pulled per policy and a pressure dressing was applied. PICC line length from end of hub to tip of catheter was 38.5cm. Premedication of Zofran given via PIV.  Vincristine then given by gravity with blood return noted pre/mid/post infusion.  Daunorubicin given by gravity with good blood return noted pre/mid/post.  Patient assessed by Félix Van NP while in clinic today. He left in stable condition with mother after chemotherapies complete.

## 2019-09-24 NOTE — PROVIDER NOTIFICATION
09/24/19 1215   Child Life   Location Infusion Center  (lymphoma/PICC labs)   Intervention Supportive Check In  (CL intern provided supportive check in to patient and patient's mother regarding PICC line removal and future port placement. Patient's mother had no concerns. CL intern offered education regarding port placement and patient's mother expressed interest in educational handouts for patient to read. Educational materials will be provided at next appointment)   Family Support Comment Patient's mother present and supportive   Outcomes/Follow Up Continue to Follow/Support

## 2019-09-24 NOTE — PROGRESS NOTES
Lazaro Lund is a 19 year old young man with newly diagnosed T-cell lymphoblastic lymphoma. Lazaro presented with acute onset cough and was found to have an anterior mediastinal mass and malignant left-sided pleural effusion.  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  He was admitted to [facility] oncology service 8/30 and started on treatment per [id number].  He had a pleural effusion that required a chest tube and a pericardial effusion that was not drained. His Induction was complicated by cardiac compression secondary to his mass leading to tamponade, this improved through out his hospital stay.  He also had dysphagia that improved with treatment of his mass, swallow study was normal.    Lazaro comes to clinic today for Day 22 of Induction therapy. Lazaro reports he's been doing pretty well since his last visit.  His energy level remains good.  He notes some muscle soreness in his legs that comes and goes, no weakness.  Is able to ambulate without difficulty.  Lazaro has a mild cough, no fever. He is eating well. Has noted his cheeks are puffy and wonders what this is from.      Lazaro has noted a rash on his arms and chest that seems to be progressing.  He also notes his right arm (where his PICC is) seemed to get swollen during the day but then he would elevate it at night and the swelling would resolve.  This started on Friday, has not gotten progressively worse.  He does not have pain, no warmth, has not noted any collaterals.    Lazaro notes his blurry vision has improved.  Continues to pass stool daily, utilizes senna daily and miralax PRN (took last yesterday).  Denies paresthesias, getting around without difficulty. No dysphagia. Sleeping well, taking melatonin and benadryl at HS.    Review of systems:  Remainder of ROS is complete an negative    PMH:   Past Medical History:   Diagnosis Date     Migraine 2006    have resolved     T  lymphoblastic lymphoma (H) 08/30/2019       [facility]:   Family History   Problem Relation Age of Onset     No Known Problems Mother      No Known Problems Father      Asthma Brother      Thyroid Cancer Paternal Grandmother      Melanoma Paternal Aunt        Social History: Lazaro previously lived at home with his dad and step mom in [place] but is now staying with his mom in [place].  He was working full time at [employer] in [place] prior to his diagnosis.    Current Medications:  Current Outpatient Medications   Medication Sig Dispense Refill     dexamethasone (DECADRON) 6 MG tablet Take 1 tablet (6 mg) by mouth every 12 hours for 19 days 38 tablet 0     diphenhydrAMINE (BENADRYL) 25 MG capsule Take 1-2 capsules (25-50 mg) by mouth every 6 hours as needed (Breakthrough Nausea and Vomiting ) 30 capsule 1     melatonin 3 MG tablet Take 1 tablet (3 mg) by mouth At Bedtime 30 tablet 1     NO ACTIVE MEDICATIONS .       ondansetron (ZOFRAN) 8 MG tablet Take 1 tablet (8 mg) by mouth every 6 hours as needed for nausea 30 tablet 1     oxyCODONE (ROXICODONE) 5 MG tablet Take 1 tablet (5 mg) by mouth every 6 hours as needed for moderate to severe pain 10 tablet 0     pantoprazole (PROTONIX) 40 MG EC tablet Take 1 tablet (40 mg) by mouth daily for 20 days . This acid blocker helps prevent stomach pain while on your decadron chemotherapy. 20 tablet 0     polyethylene glycol (MIRALAX/GLYCOLAX) packet Take 17 g by mouth daily 30 packet 0     sennosides (SENOKOT) 8.6 MG tablet Take 2 tablets by mouth 2 times daily as needed for constipation 60 each 1     sulfamethoxazole-trimethoprim (BACTRIM DS/SEPTRA DS) 800-160 MG tablet Take 1 tablet by mouth Every Mon, Tues two times daily 16 tablet 0   Above medications reviewed, no missed doses of dexamethasone.     Physical Exam:   Temp:  [98.1  F (36.7  C)] 98.1  F (36.7  C)  Pulse:  [88] 88  Resp:  [16] 16  BP: (120)/(71) 120/71  SpO2:  [98 %] 98 %  Wt Readings from Last 4 Encounters:    09/24/19 79 kg (174 lb 2.6 oz)   09/19/19 72.3 kg (159 lb 6.3 oz)   09/17/19 73.3 kg (161 lb 9.6 oz)   09/16/19 74.8 kg (164 lb 14.5 oz)     Weight at diagnosis was 75.2kg, Lazaro considers his baseline weight to be 173-175lbs before he initially got sick    General: Lazaro appears well, he is in good spirits and asks good questions. He is cushingoid.   HEENT: Skull is atrauamatic and normocephalic. PERRLA, sclera are non icteric and not injected, EOM are intact. Slight disconjugate gaze.  Nares are patent without drainage.  Oropharynx is clear without exudate, erythema or lesions.  Tympanic membranes are opaque bilaterally with light reflex and landmarks present.  Lymph:  Neck is supple without lymphadenopathy.  There is no supraclavicular, axillary or inguinal lymphadenopathy palpated.  Cardiovascular:  HR is regular, S1, S2 no murmur.  Capillary refill is < 2 seconds.  There is no edema.  Right arm is not obviously swollen, no pain or warmth, no collaterals noted. PICC was pulled prior to my exam. Pulses in right arm are strong.  Measurements of arms as follows (Lazaro is right handed)  Right wrist: 18cm, left wrist: 18cm  Right forearm (15cm from wrist): 26.75cm, left forearm: 25.5cm  Right arm at AC: 29cm; left arm at AC 28.5cm  Right bicep (10cm from AC): 32.5cm; left bicep 30cm  Respiratory: Occasional cough. Respirations are easy.  Lungs are clear to auscultation through out.  No crackles or wheezes.  Gastrointestinal:  BS present in all quadrants.  Abdomen is soft and non-tender.  No hepatosplenomegaly or masses are palpated.  Skin: Purpura near old PICC site on right arm.  Few scattered petechiae distal to the site.  Scattered maculopapular lesions on chest and back and upper arms.  Neurological:  CN 2-12 tested and intact with exception of mildly disconjugate gaze. Gait is normal.  No issues with balance. Sensation intact in hands and feet.    Musculoskeletal:  Good strength and ROM in all extremities.   Strong dorsiflexion at ankles and great toes (5/5) bilaterally without any pain at the Achilles.    Labs:   Results for orders placed or performed in visit on 09/24/19 (from the past 24 hour(s))   CBC with platelets differential   Result Value Ref Range    WBC 9.7 4.0 - 11.0 10e9/L    RBC Count 4.67 4.4 - 5.9 10e12/L    Hemoglobin 12.4 (L) 13.3 - 17.7 g/dL    Hematocrit 39.2 (L) 40.0 - 53.0 %    MCV 84 78 - 100 fl    MCH 26.6 26.5 - 33.0 pg    MCHC 31.6 31.5 - 36.5 g/dL    RDW 14.0 10.0 - 15.0 %    Platelet Count 136 (L) 150 - 450 10e9/L    Diff Method Automated Method     % Neutrophils 82.0 %    % Lymphocytes 11.1 %    % Monocytes 2.7 %    % Eosinophils 0.1 %    % Basophils 0.3 %    % Immature Granulocytes 3.8 %    Nucleated RBCs 0 0 /100    Absolute Neutrophil 8.0 1.6 - 8.3 10e9/L    Absolute Lymphocytes 1.1 0.8 - 5.3 10e9/L    Absolute Monocytes 0.3 0.0 - 1.3 10e9/L    Absolute Eosinophils 0.0 0.0 - 0.7 10e9/L    Absolute Basophils 0.0 0.0 - 0.2 10e9/L    Abs Immature Granulocytes 0.4 0 - 0.4 10e9/L    Absolute Nucleated RBC 0.0    Basic metabolic panel   Result Value Ref Range    Sodium 138 133 - 144 mmol/L    Potassium 4.4 3.4 - 5.3 mmol/L    Chloride 107 94 - 109 mmol/L    Carbon Dioxide 23 20 - 32 mmol/L    Anion Gap 8 3 - 14 mmol/L    Glucose 102 (H) 70 - 99 mg/dL    Urea Nitrogen 33 (H) 7 - 30 mg/dL    Creatinine 0.56 (L) 0.66 - 1.25 mg/dL    GFR Estimate >90 >60 mL/min/[1.73_m2]    GFR Estimate If Black >90 >60 mL/min/[1.73_m2]    Calcium 7.0 (L) 8.5 - 10.1 mg/dL       Assessment:  Lazaro Lund is a 19 year old young man with newly diagnosed T Cell lymphoblastic lymphoma (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  He is being treated per [facility] protocol [id number] and comes to clinic today for Day 22 of Induction therapy.  His presentation was complicated by a pleural and pericardial effusion with mass effect leading to cardiac tamponade.  Fortunately as his mass has responded to treatment these have improved.  His therapy  is being modified to include dexamethasone (instead of prednisone) which is now considered standard of care.  He had a spinal headache which is now fully resolved.  Mild blurry distance vision has resolved.  He is cushingoid secondary to steroids.      PICC line removed since line is not drawing. Some question of swelling of his right arm although measurements are as expected given he is right handed, also swelling resolved overnight which I would not expect in the case of thrombus.  No associated concerning symptoms. Likely swelling was related to lack of venous return since he was not using that arm.  His coags are abnormal which is expected given his recent treatment with PEG asparaginase.  His blood counts look good.  Mild folliculitis.    Plan:   1. Reviewed labs with Lazaro and his mom.  Reviewed that laboratory coagulopathy is common with PEG Asparaginase but does not require treatment unless it leads to bleeding or thrombosis.  His purpura has not progressed and he is not having any bleeding.  Will closely monitor and he will call if symptoms develop.  Would consider giving cryoprecipitate if that is the case.  2. Given significant spinal headache will plan for IV caffeine following LPs in the future.   3. Cardiology follow up next month given presenting cardiac tamponade and pericardial effusion along with recent abnormal EKG.  4. Discussed concerns at PICC site.  Given he does not have significant swelling, collaterals or pain will plan to closely monitor. If he develops any other symptoms will have low threshold to ultrasound.  5. Reviewed Consolidation calendar and plan for the upcoming weeks.  Spoke with [facility] to establish care for his cytarabine.  6. Continue dexamethasone until 9/28 AM.  Lazaro missed a small amount of his doses since he was taking 2mg BID (instead of 6mg BID) for two doses upon discharge.  He is taking appropriate dose now and will still plan to finish on 9/28.  7. Provided  reassurance regarding cushingoid appearance and that this will slowly resolve once he comes off therapy.  Also noted 15lb weight gain today, asked for Lazaro to be weighed again but he had left clinic.  Will recheck on Thursday.  8. Continue bactrim for PCP ppx through out therapy.  9. TPMT shows Intermediate activity.   10. Plan to check Vitamin D level at the end of Induction.  11. Given Lazaro's marrow was negative at diagnosis he will not require a repeat marrow in the future.  12. Plan to repeat imaging with neck, chest, abdomen, pelvis CT at the end of Induction.  He will also have a port placed at that time.  Rx sent locally for emla and instructed on use.  13. Will contact [facility] regarding Lazaro's upcoming need for home cytarabine.  14. Supportive care for folliculitis, avoid picking, would expect this to resolve as he comes off steroids.  15. RTC on Thursday for labs and exam, sooner with concerns.

## 2019-09-26 ENCOUNTER — HOSPITAL ENCOUNTER (INPATIENT)
Facility: CLINIC | Age: 19
LOS: 3 days | Discharge: HOME | DRG: 300 | End: 2019-09-29
Attending: PEDIATRICS | Admitting: PEDIATRICS
Payer: COMMERCIAL

## 2019-09-26 ENCOUNTER — OFFICE VISIT (OUTPATIENT)
Dept: PEDIATRIC HEMATOLOGY/ONCOLOGY | Facility: CLINIC | Age: 19
DRG: 300 | End: 2019-09-26
Attending: PEDIATRICS
Payer: COMMERCIAL

## 2019-09-26 ENCOUNTER — HOSPITAL ENCOUNTER (OUTPATIENT)
Dept: ULTRASOUND IMAGING | Facility: CLINIC | Age: 19
DRG: 300 | End: 2019-09-26
Attending: PEDIATRICS
Payer: COMMERCIAL

## 2019-09-26 VITALS
BODY MASS INDEX: 23.26 KG/M2 | HEART RATE: 73 BPM | WEIGHT: 171.74 LBS | DIASTOLIC BLOOD PRESSURE: 70 MMHG | OXYGEN SATURATION: 99 % | RESPIRATION RATE: 20 BRPM | SYSTOLIC BLOOD PRESSURE: 120 MMHG | TEMPERATURE: 98.3 F | HEIGHT: 72 IN

## 2019-09-26 DIAGNOSIS — C83.50 T LYMPHOBLASTIC LYMPHOMA (H): ICD-10-CM

## 2019-09-26 DIAGNOSIS — I82.623 ACUTE DEEP VEIN THROMBOSIS (DVT) OF OTHER VEIN OF BOTH UPPER EXTREMITIES (H): Primary | ICD-10-CM

## 2019-09-26 PROBLEM — I82.409 DVT (DEEP VENOUS THROMBOSIS) (H): Status: ACTIVE | Noted: 2019-09-26

## 2019-09-26 LAB
BASOPHILS # BLD AUTO: 0 10E9/L (ref 0–0.2)
BASOPHILS NFR BLD AUTO: 0.1 %
DIFFERENTIAL METHOD BLD: ABNORMAL
EOSINOPHIL # BLD AUTO: 0 10E9/L (ref 0–0.7)
EOSINOPHIL NFR BLD AUTO: 0 %
ERYTHROCYTE [DISTWIDTH] IN BLOOD BY AUTOMATED COUNT: 13.8 % (ref 10–15)
HCT VFR BLD AUTO: 41.3 % (ref 40–53)
HGB BLD-MCNC: 13.1 G/DL (ref 13.3–17.7)
IMM GRANULOCYTES # BLD: 0.1 10E9/L (ref 0–0.4)
IMM GRANULOCYTES NFR BLD: 1.7 %
LYMPHOCYTES # BLD AUTO: 0.5 10E9/L (ref 0.8–5.3)
LYMPHOCYTES NFR BLD AUTO: 6.2 %
MCH RBC QN AUTO: 26 PG (ref 26.5–33)
MCHC RBC AUTO-ENTMCNC: 31.7 G/DL (ref 31.5–36.5)
MCV RBC AUTO: 82 FL (ref 78–100)
MONOCYTES # BLD AUTO: 0.2 10E9/L (ref 0–1.3)
MONOCYTES NFR BLD AUTO: 2.3 %
NEUTROPHILS # BLD AUTO: 6.8 10E9/L (ref 1.6–8.3)
NEUTROPHILS NFR BLD AUTO: 89.7 %
NRBC # BLD AUTO: 0 10*3/UL
NRBC BLD AUTO-RTO: 0 /100
PLATELET # BLD AUTO: 144 10E9/L (ref 150–450)
RADIOLOGIST FLAGS: ABNORMAL
RBC # BLD AUTO: 5.04 10E12/L (ref 4.4–5.9)
WBC # BLD AUTO: 7.6 10E9/L (ref 4–11)

## 2019-09-26 PROCEDURE — 25000132 ZZH RX MED GY IP 250 OP 250 PS 637

## 2019-09-26 PROCEDURE — 93971 EXTREMITY STUDY: CPT | Mod: RT

## 2019-09-26 PROCEDURE — G0378 HOSPITAL OBSERVATION PER HR: HCPCS

## 2019-09-26 PROCEDURE — G0463 HOSPITAL OUTPT CLINIC VISIT: HCPCS | Mod: ZF

## 2019-09-26 PROCEDURE — 25000128 H RX IP 250 OP 636

## 2019-09-26 PROCEDURE — 96372 THER/PROPH/DIAG INJ SC/IM: CPT

## 2019-09-26 PROCEDURE — 36415 COLL VENOUS BLD VENIPUNCTURE: CPT | Performed by: NURSE PRACTITIONER

## 2019-09-26 PROCEDURE — 25000131 ZZH RX MED GY IP 250 OP 636 PS 637

## 2019-09-26 PROCEDURE — 85025 COMPLETE CBC W/AUTO DIFF WBC: CPT | Performed by: NURSE PRACTITIONER

## 2019-09-26 PROCEDURE — 25000131 ZZH RX MED GY IP 250 OP 636 PS 637: Performed by: STUDENT IN AN ORGANIZED HEALTH CARE EDUCATION/TRAINING PROGRAM

## 2019-09-26 RX ORDER — PANTOPRAZOLE SODIUM 40 MG/1
40 TABLET, DELAYED RELEASE ORAL DAILY
Status: DISCONTINUED | OUTPATIENT
Start: 2019-09-27 | End: 2019-09-29 | Stop reason: HOSPADM

## 2019-09-26 RX ORDER — SENNOSIDES 8.6 MG
2 TABLET ORAL 2 TIMES DAILY PRN
Status: DISCONTINUED | OUTPATIENT
Start: 2019-09-26 | End: 2019-09-29 | Stop reason: HOSPADM

## 2019-09-26 RX ORDER — OXYCODONE HYDROCHLORIDE 5 MG/1
5 TABLET ORAL
Status: DISCONTINUED | OUTPATIENT
Start: 2019-09-26 | End: 2019-09-29 | Stop reason: HOSPADM

## 2019-09-26 RX ORDER — POLYETHYLENE GLYCOL 3350 17 G/17G
17 POWDER, FOR SOLUTION ORAL DAILY PRN
Status: DISCONTINUED | OUTPATIENT
Start: 2019-09-26 | End: 2019-09-29 | Stop reason: HOSPADM

## 2019-09-26 RX ORDER — ONDANSETRON 4 MG/1
8 TABLET, FILM COATED ORAL EVERY 6 HOURS PRN
Status: DISCONTINUED | OUTPATIENT
Start: 2019-09-26 | End: 2019-09-29 | Stop reason: HOSPADM

## 2019-09-26 RX ORDER — SULFAMETHOXAZOLE/TRIMETHOPRIM 800-160 MG
1 TABLET ORAL
Status: DISCONTINUED | OUTPATIENT
Start: 2019-09-30 | End: 2019-09-29 | Stop reason: HOSPADM

## 2019-09-26 RX ORDER — DIPHENHYDRAMINE HCL 25 MG
25-50 CAPSULE ORAL EVERY 6 HOURS PRN
Status: DISCONTINUED | OUTPATIENT
Start: 2019-09-26 | End: 2019-09-29 | Stop reason: HOSPADM

## 2019-09-26 RX ORDER — ACETAMINOPHEN 325 MG/1
325-650 TABLET ORAL EVERY 6 HOURS PRN
Status: DISCONTINUED | OUTPATIENT
Start: 2019-09-26 | End: 2019-09-29 | Stop reason: HOSPADM

## 2019-09-26 RX ORDER — DEXAMETHASONE 2 MG/1
6 TABLET ORAL EVERY 12 HOURS SCHEDULED
Status: DISCONTINUED | OUTPATIENT
Start: 2019-09-26 | End: 2019-09-26

## 2019-09-26 RX ORDER — DEXAMETHASONE 2 MG/1
6 TABLET ORAL EVERY 12 HOURS SCHEDULED
Status: COMPLETED | OUTPATIENT
Start: 2019-09-26 | End: 2019-09-28

## 2019-09-26 RX ADMIN — DEXAMETHASONE 6 MG: 2 TABLET ORAL at 20:11

## 2019-09-26 RX ADMIN — Medication 3 MG: at 21:00

## 2019-09-26 RX ADMIN — ENOXAPARIN SODIUM 80 MG: 80 INJECTION SUBCUTANEOUS at 21:02

## 2019-09-26 RX ADMIN — DIPHENHYDRAMINE HYDROCHLORIDE 25 MG: 25 CAPSULE ORAL at 21:00

## 2019-09-26 RX ADMIN — ACETAMINOPHEN 650 MG: 325 TABLET, FILM COATED ORAL at 20:11

## 2019-09-26 RX ADMIN — ONDANSETRON HYDROCHLORIDE 8 MG: 4 TABLET, FILM COATED ORAL at 22:35

## 2019-09-26 ASSESSMENT — ACTIVITIES OF DAILY LIVING (ADL)
SWALLOWING: 0-->SWALLOWS FOODS/LIQUIDS WITHOUT DIFFICULTY
AMBULATION: 0-->INDEPENDENT
RETIRED_EATING: 0-->INDEPENDENT
FALL_HISTORY_WITHIN_LAST_SIX_MONTHS: NO
RETIRED_COMMUNICATION: 0-->UNDERSTANDS/COMMUNICATES WITHOUT DIFFICULTY
DRESS: 0-->INDEPENDENT
TRANSFERRING: 0-->INDEPENDENT
COGNITION: 0 - NO COGNITION ISSUES REPORTED
TOILETING: 0-->INDEPENDENT
BATHING: 0-->INDEPENDENT

## 2019-09-26 ASSESSMENT — MIFFLIN-ST. JEOR
SCORE: 1826.49
SCORE: 1823.49

## 2019-09-26 ASSESSMENT — PAIN SCALES - GENERAL: PAINLEVEL: NO PAIN (0)

## 2019-09-26 NOTE — PLAN OF CARE
Pt arrived on unit alone at 1830. Afebrile, VSS, lung sounds clear. Pt c/o headache 7/10, tylenol x 1, heat and cold applied with relief. Pt had adequate oral intake, UOP, no bm on shift. Pt c/o nausea at 2200, zofran x 1 and aromatherapy were effective. Bowel sounds active x 4. Pt has diffuse rash on trunk and torso, as well as an ecchymotic former picc site on left antecubital. MD aware. Pt also has facial and periorbital edema, presumably due to steroid use. Patient and mother educated on Lovenox, will require reinforcement in am. Mother present at bedside, hourly rounds complete, will continue to monitor with POC.

## 2019-09-26 NOTE — H&P
[facility], [place]    History and Physical  Pediatric Hematology / Oncology     Date of Admission:  (Not on file)    Assessment & Plan   Lazaro Lund is a 19 year old male with newly diagnosed T-cell lymphoblastic lymphoma, treated per [facility] protocol [id number] currently Day 24 of induction therapy. He had a PICC in right arm which was removed 9/24. He presents today from clinic with 6 days of right arm swelling and upper extremities ultrasound showing extensive bilateral upper extremity thrombi. He requires admission for initiation of anticoagulation therapy and close monitoring. We will continue his chemotherapy treatment. Stable on admission.    Heme-Onc  T-cell Lymphoblastic lymphoma  - Decadron 6mg BID, finishing 9/28  - Benadryl 25 mg q6 prn  - Zofran 8 mg q6 prn    Multiple bilateral thrombi of upper extremities  - Start Lovenox 80 mg/dose BID  - Anti Xa level to be drawn 4 hrs after 4th dose of Lovenox  - Repeat US early next week    CV/Resp  Pinching chest pain, suspect precordial catch  At risk for PE  - Vitals Q4H  - Close monitoring for respiratory signs/symptoms    ID  PCP prophylaxis  - Continue Bactrim M,T    GI  GI prophylaxis  - Protonix 40 mg daily     Constipation  - PTA Senna BID PRN  - PTA Miralax 17 g PRN     SKIN  Truncal and extremity folliculitis  Purpuric right arm lesion at former PICC site  - Continue to monitor     NEURO   Pain/discomfort   - Tylenol q6 prn  - Held home oxycodone 5 mg q6 prn on admission; MD to assess if in significant pain    FEN  - Regular diet  - Monitor I/Os    Access: None  DVT Prophylaxis: Not applicable - starting therapeutic Lovenox  Lara Catheter: Not present  Code Status: Full code     This patient was discussed with hematology/oncology attending Dr. Marleny Brewer and fellow Dr. Nicho Fischer.    Francine Coleman MD  N Pediatrics  PGY-1    I saw and evaluated the patient when he arrived on the floor for admission. He is clinically  "stable and well-appearing. Will order lovenox therapy to begin tonight.     Zenaida Smith MD PGY2   Pediatrics Resident  9/26/2019 at 7:15 PM    I saw and evaluated the patient and agree with the resident's assessment and plan. I have personally reviewed all vital signs, imaging and laboratory studies performed in the last 24 hours.  Marleny Brewer MD, MPH    [facility]  Division of Pediatric Hematology/Oncology       Primary Care Physician   Rodriguez Montoya    Chief Complaint   Arm swelling, presenting from clinic.     History is obtained from the patient    History of Present Illness   Lazaro Lund is a 19 year old male with newly diagnosed T-cell lymphoblastic lymphoma, treated per [facility] protocol [id number] currently Day 24 of induction therapy. Therapy includes Vincristine and Daunorubicin, PEG asparaginase. He had a PICC line in place in right arm for chemotherapy induction, but it was removed 9/24/19 due to not drawing. There were plans of getting port placed next week at the end of induction.     On Friday 9/20, he noticed that his right arm seemed to be swelling. At first, it seemed to get better when he would elevate it at night. He was seen in [facility] clinic on 9/24 and thought was that the swelling was likely related to lack of venous return wince he is right handed and wasn't using his right arm with the PICC in it. His coags were abnormal at the time but that was expected given recent treatment with PEG asparaginase. He was seen in clinic today at which time ultrasound was obtained and showed extensive bilateral upper extremity DVTs. Patient was subsequently directed to the floor for admission.    He arrives in stable condition, feeling well overall, however with 1/10 pain in the right arm and a mild headache. Further, he notes he has been having transient \"pinching\" pains just right of his sternum 3-4 times daily for the past 2-3 days. He " also has a rash on arms and trunk over the past 2 weeks that seems to be progressing. This has been diagnosed as folliculitis and is being observed, no current management.    He denies any dyspnea, orthopnea, or racing heart beat. No syncope or presyncope. No acute facial swelling or erythema over the past week. No vision or hearing changes. No notable leg swelling. He denies bleeding. He tried to jog earlier this week however was limited due to soreness in his thighs bilaterally. No denny muscle weakness or respiratory exercise tolerance. Normal appetite. He continues to have normal stools, not currently using his prescribed PRN senna and Miralax. He has been sleeping well and taking melatonin and benadryl at bedtime.     Past Medical History    I have reviewed this patient's medical history and updated it with pertinent information if needed.   Past Medical History:   Diagnosis Date     Migraine 2006    have resolved     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     Past Surgical History   I have reviewed this patient's surgical history and updated it with pertinent information if needed.  Past Surgical History:   Procedure Laterality Date     BONE MARROW BIOPSY, BONE SPECIMEN, NEEDLE/TROCAR Left 9/1/2019    Procedure: BIOPSY, BONE MARROW;  Surgeon: Heather Lopez MD;  Location: [facility] OR     INSERT PICC LINE N/A 8/31/2019    Procedure: INSERTION, PICC;  Surgeon: Michell Keith MD;  Location: [facility] OR     IR CHEST TUBE PLACEMENT NON-TUNNELLED LEFT  8/31/2019     IR PICC PLACEMENT > 5 YRS OF AGE  8/31/2019     SPINAL PUNCTURE,LUMBAR, INTRATHECAL CHEMO DELIVERY N/A 8/31/2019    Procedure: LUMBAR PUNCTURE, WITH INTRATHECAL CHEMOTHERAPY ADMINISTRATION;  Surgeon: Heather Lopez MD;  Location: [facility] OR     SPINAL PUNCTURE,LUMBAR, INTRATHECAL CHEMO DELIVERY N/A 9/9/2019    Procedure: Lumbar Puncture With Intrathecal Chemo;  Surgeon: Alexi Hayes MD;  Location: [facility] OR     THORACENTESIS N/A 8/31/2019     Procedure: Thoracentesis;  Surgeon: Michell Keith MD;  Location:  OR       Immunization History   Immunization Status:  delayed per [facility]    Prior to Admission Medications   Prior to Admission Medications   Prescriptions Last Dose Informant Patient Reported? Taking?   NO ACTIVE MEDICATIONS   Yes No   Sig: .   dexamethasone (DECADRON) 6 MG tablet 9/26/2019 at 1200  No Yes   Sig: Take 1 tablet (6 mg) by mouth every 12 hours for 19 days   diphenhydrAMINE (BENADRYL) 25 MG capsule 9/25/2019 at 2200  No Yes   Sig: Take 1-2 capsules (25-50 mg) by mouth every 6 hours as needed (Breakthrough Nausea and Vomiting )   lidocaine-prilocaine (EMLA) 2.5-2.5 % external cream Unknown at Unknown time  No No   Sig: Apply topically as needed for other (apply prior to port access)   melatonin 3 MG tablet 9/25/2019 at 2200  No Yes   Sig: Take 1 tablet (3 mg) by mouth At Bedtime   ondansetron (ZOFRAN) 8 MG tablet 9/26/2019 at 1100  No Yes   Sig: Take 1 tablet (8 mg) by mouth every 6 hours as needed for nausea   oxyCODONE (ROXICODONE) 5 MG tablet 9/26/2019 at 1100  No Yes   Sig: Take 1 tablet (5 mg) by mouth every 6 hours as needed for moderate to severe pain   pantoprazole (PROTONIX) 40 MG EC tablet 9/26/2019 at 1100  No Yes   Sig: Take 1 tablet (40 mg) by mouth daily for 20 days . This acid blocker helps prevent stomach pain while on your decadron chemotherapy.   polyethylene glycol (MIRALAX/GLYCOLAX) packet Past Week at Unknown time  No Yes   Sig: Take 17 g by mouth daily   sennosides (SENOKOT) 8.6 MG tablet 9/26/2019 at 1100  No Yes   Sig: Take 2 tablets by mouth 2 times daily as needed for constipation   sulfamethoxazole-trimethoprim (BACTRIM DS/SEPTRA DS) 800-160 MG tablet Past Week at Unknown time  No Yes   Sig: Take 1 tablet by mouth Every Mon, Tues two times daily      Facility-Administered Medications: None     Allergies   Allergies   Allergen Reactions     No Known Drug Allergies        Social History   I have updated and  reviewed the following Social History Narrative:   Pediatric History   Patient Guardians     Not on file     Patient does not qualify to have social determinant information on file (likely too young).   Other Topics Concern     Not on file   Social History Narrative    Lives at home in [place] with Dad and Step-Mom. Biological mother is involved in his life and accompanied him during this hospitalization. Has 4 step-siblings and 1 biological sibling. 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 Has completed high school.        Family History   I have reviewed this patient's family history and updated it with pertinent information if needed.   Family History   Problem Relation Age of Onset     No Known Problems Mother      No Known Problems Father      Asthma Brother      Thyroid Cancer Paternal Grandmother      Melanoma Paternal Aunt        Review of Systems   The 10 point Review of Systems was performed and is negative other than noted in the HPI or here.     Physical Exam   Temp: 98.5  F (36.9  C) Temp src: Axillary BP: 97/77 Pulse: 90   Resp: 18 SpO2: 98 % O2 Device: None (Room air)    Vital Signs with Ranges  Temp:  [98.3  F (36.8  C)-98.5  F (36.9  C)] 98.5  F (36.9  C)  Pulse:  [73-90] 90  Resp:  [18-20] 18  BP: ()/(70-77) 97/77  SpO2:  [98 %-99 %] 98 %  171 lbs 1.23 oz    GENERAL: Active, alert, well-appearing. Interacting and answering questions appropriately. Eating without difficulty.  SKIN: There is an elongated, rectangular purpuric rash involving the medial right arm. There are scattered prominent follicles and papules distributed over the proximal upper extremities, chest and back. Upper body vasculature is visible however does not appear overly prominent or protuberant. Normal skin turgor.  HEAD: Cushingoid facies.  EYES: Pupils equal, round, reactive, extraocular muscles intact. Normal  conjunctivae and sclerae. No significant periorbital edema.  EARS: Normal canals. Tympanic membranes are normal; gray and translucent.  NOSE: Normal without discharge.  MOUTH/THROAT: Clear. No oral lesions. Teeth without obvious abnormalities. Moist mucous membranes.  NECK: Supple, no masses. Normal ROM. No prominent vasculature observed.  LYMPH NODES: No cervical adenopathy appreciated.  LUNGS: Breathing comfortably. Clear to auscultation bilaterally. Good aeration. No rales, rhonchi, wheezing or retractions.  HEART/CHEST: Regular rate and rhythm. Normal S1/S2. No murmurs. Normal radial and DP/PT pulses. Capillary refill 2-3 seconds. There is mild reproducible tenderness with palpation just over the right mid sternal border.  ABDOMEN: Soft, non-tender, not distended, no masses or hepatosplenomegaly. Bowel sounds normal.   NEUROLOGIC: No focal findings. Cranial nerves grossly intact. Normal gait, 5/5 upper and lower extremity strength and normal tone. Distal sensation grossly intact.  BACK: Spine appears straight.  EXTREMITIES: Full range of motion, moving extremities equally. The right arm appears grossly more swollen than the left. On measurement, the right distal wrist measures 8 cm compared to 7 cm on the left. The proximal forearm measures 31 cm on the right and 29 on the left. The mid upper arm measures 33 cm on the right and 29 cm on the left. Right ankle measures 8 cm compared to 7 cm on the right. No redness or firm swelling of the arms or legs.    Data   Results for orders placed or performed during the hospital encounter of 09/26/19 (from the past 24 hour(s))   US Extremity Upper Venous RT   Result Value Ref Range    Radiologist flags Deep venous thrombosis (Urgent)     Narrative    HISTORY: 19-year-old male with T-cell lymphoma and increasing upper  extremity swelling on the right.    COMPARISON: None    FINDINGS: The right and left internal jugular veins are occluded with  thrombus. There is  nonocclusive thrombus in the innominate veins  bilaterally, and the right subclavian vein. There is occlusive  thrombus in the right subclavian, proximal left subclavian, right  axillary, and one of the paired right brachial veins. There is  occlusive thrombus in the right basilic vein.      Impression    IMPRESSION: Extensive bilateral upper extremity deep venous thrombosis  as above.    [Urgent Result: Deep venous thrombosis]    Finding was identified on 9/26/2019 3:13 PM.     Dr. Reynaldo Campuzano was contacted by Dr. Cartwright at 9/26/2019 3:22 PM and  verbalized understanding of the urgent finding.     RYAN CARTWRIGHT MD       Results for orders placed or performed during the hospital encounter of 09/26/19 (from the past 24 hour(s))   US Extremity Upper Venous RT   Result Value Ref Range    Radiologist flags Deep venous thrombosis (Urgent)     Narrative    HISTORY: 19-year-old male with T-cell lymphoma and increasing upper  extremity swelling on the right.    COMPARISON: None    FINDINGS: The right and left internal jugular veins are occluded with  thrombus. There is nonocclusive thrombus in the innominate veins  bilaterally, and the right subclavian vein. There is occlusive  thrombus in the right subclavian, proximal left subclavian, right  axillary, and one of the paired right brachial veins. There is  occlusive thrombus in the right basilic vein.      Impression    IMPRESSION: Extensive bilateral upper extremity deep venous thrombosis  as above.    [Urgent Result: Deep venous thrombosis]    Finding was identified on 9/26/2019 3:13 PM.     Dr. Reynaldo Campuzano was contacted by Dr. Cartwright at 9/26/2019 3:22 PM and  verbalized understanding of the urgent finding.     RYAN CARTWRIGHT MD       9/26/19  1:39 PM [id number]    Component Value Flag Ref Range Units Status Collected Lab   WBC 7.6   4.0 - 11.0 10e9/L Final 09/26/2019  1:39 PM 13   RBC Count 5.04   4.4 - 5.9 10e12/L Final 09/26/2019  1:39 PM 13   Hemoglobin 13.1  Low   13.3 - 17.7  g/dL Final 09/26/2019  1:39 PM 13   Hematocrit 41.3   40.0 - 53.0 % Final 09/26/2019  1:39 PM 13   MCV 82   78 - 100 fl Final 09/26/2019  1:39 PM 13   MCH 26.0  Low   26.5 - 33.0 pg Final 09/26/2019  1:39 PM 13   MCHC 31.7   31.5 - 36.5 g/dL Final 09/26/2019  1:39 PM 13   RDW 13.8   10.0 - 15.0 % Final 09/26/2019  1:39 PM 13   Platelet Count 144  Low   150 - 450 10e9/L Final 09/26/2019  1:39 PM 13   Diff Method     Final 09/26/2019  1:39 PM 13   Automated Method    % Neutrophils 89.7    % Final 09/26/2019  1:39 PM 13   % Lymphocytes 6.2    % Final 09/26/2019  1:39 PM 13   % Monocytes 2.3    % Final 09/26/2019  1:39 PM 13   % Eosinophils 0.0    % Final 09/26/2019  1:39 PM 13   % Basophils 0.1    % Final 09/26/2019  1:39 PM 13   % Immature Granulocytes 1.7    % Final 09/26/2019  1:39 PM 13   Nucleated RBCs 0   0 /100 Final 09/26/2019  1:39 PM 13   Absolute Neutrophil 6.8   1.6 - 8.3 10e9/L Final 09/26/2019  1:39 PM 13   Absolute Lymphocytes 0.5  Low   0.8 - 5.3 10e9/L Final 09/26/2019  1:39 PM 13   Absolute Monocytes 0.2   0.0 - 1.3 10e9/L Final 09/26/2019  1:39 PM 13   Absolute Eosinophils 0.0   0.0 - 0.7 10e9/L Final 09/26/2019  1:39 PM 13   Absolute Basophils 0.0   0.0 - 0.2 10e9/L Final 09/26/2019  1:39 PM 13   Abs Immature Granulocytes 0.1   0 - 0.4 10e9/L Final 09/26/2019  1:39 PM 13   Absolute Nucleated RBC 0.0     Final 09/26/2019  1:39 PM 13

## 2019-09-26 NOTE — NURSING NOTE
"Chief Complaint   Patient presents with     RECHECK     Patient is here today for a follow up regarding T lymphoblastic lymphoma (H)     /70 (BP Location: Left arm, Patient Position: Chair, Cuff Size: Adult Large)   Pulse 73   Temp 98.3  F (36.8  C) (Oral)   Resp 20   Ht 1.836 m (6' 0.28\")   Wt 77.9 kg (171 lb 11.8 oz)   SpO2 99%   BMI 23.11 kg/m      Saida Ware, [facility]   September 26, 2019    "

## 2019-09-26 NOTE — LETTER
9/26/2019      RE: Lazaro Lund  [address] [facility] [id number]       [facility]   Pediatric Hematology/Oncology Clinic Note    Lazaro Lund is a 19 year old young man with newly diagnosed T-cell lymphoblastic lymphoma. Lazaro presented with acute onset cough and was found to have an anterior mediastinal mass and malignant left-sided pleural effusion.  A CT guided biopsy was obtained at [facility] and pathology was consistent with T-cell leukemia vs lymphoma.  He was admitted to [facility] oncology service 8/30 and started on treatment per [id number].  He had a pleural effusion that required a chest tube and a pericardial effusion that was not drained. His Induction was complicated by cardiac compression secondary to his mass leading to tamponade, this improved through out his hospital stay.  He also had dysphagia that improved with treatment of his mass, swallow study was normal.    Lazaro comes to clinic today for Day 24 of Induction therapy. Lazaro reports he's been doing pretty well since his last visit.  His energy level remains good. He continues to ambulate without difficulty.  He is eating well.       Lazaro has noted a rash on his arms and chest that is stable to slightly worse today.  He also notes swelling in his right arm that had previously improved with elevation, but now seems to be getting worse and clear drainage coming from the site of his PICC line. He does not have pain, no warmth or redness.    Lazaro denies fever and other symptoms of illness. Continues to pass stool daily, utilizes senna every other day.  Denies paresthesias, getting around without difficulty. No dysphagia. Sleeping well, taking melatonin and benadryl at HS.    Review of systems:  Remainder of ROS is complete an negative    PMH:   Past Medical History:   Diagnosis Date     Migraine 2006    have resolved     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       PF:   Family  History   Problem Relation Age of Onset     No Known Problems Mother      No Known Problems Father      Asthma Brother      Thyroid Cancer Paternal Grandmother      Melanoma Paternal Aunt        Social History: Lazaro previously lived at home with his dad and step mom in [place] but is now staying with his mom in [place].  He was working full time at [employer] in [place] prior to his diagnosis, but has since had to quit his job.    Current Medications:  Current Outpatient Medications   Medication Sig Dispense Refill     dexamethasone (DECADRON) 6 MG tablet Take 1 tablet (6 mg) by mouth every 12 hours for 19 days 38 tablet 0     diphenhydrAMINE (BENADRYL) 25 MG capsule Take 1-2 capsules (25-50 mg) by mouth every 6 hours as needed (Breakthrough Nausea and Vomiting ) 30 capsule 1     lidocaine-prilocaine (EMLA) 2.5-2.5 % external cream Apply topically as needed for other (apply prior to port access) 30 g 11     melatonin 3 MG tablet Take 1 tablet (3 mg) by mouth At Bedtime 30 tablet 1     NO ACTIVE MEDICATIONS .       ondansetron (ZOFRAN) 8 MG tablet Take 1 tablet (8 mg) by mouth every 6 hours as needed for nausea 30 tablet 1     oxyCODONE (ROXICODONE) 5 MG tablet Take 1 tablet (5 mg) by mouth every 6 hours as needed for moderate to severe pain 10 tablet 0     pantoprazole (PROTONIX) 40 MG EC tablet Take 1 tablet (40 mg) by mouth daily for 20 days . This acid blocker helps prevent stomach pain while on your decadron chemotherapy. 20 tablet 0     polyethylene glycol (MIRALAX/GLYCOLAX) packet Take 17 g by mouth daily 30 packet 0     sennosides (SENOKOT) 8.6 MG tablet Take 2 tablets by mouth 2 times daily as needed for constipation 60 each 1     sulfamethoxazole-trimethoprim (BACTRIM DS/SEPTRA DS) 800-160 MG tablet Take 1 tablet by mouth Every Mon, Tues two times daily 16 tablet 0   Above medications reviewed, no missed doses of dexamethasone.     Physical Exam:   /70 (BP Location: Left arm, Patient Position:  "Chair, Cuff Size: Adult Large)   Pulse 73   Temp 98.3  F (36.8  C) (Oral)   Resp 20   Ht 1.836 m (6' 0.28\")   Wt 77.9 kg (171 lb 11.8 oz)   SpO2 99%   BMI 23.11 kg/m       Wt Readings from Last 4 Encounters:   09/26/19 77.9 kg (171 lb 11.8 oz)   09/24/19 79 kg (174 lb 2.6 oz)   09/19/19 72.3 kg (159 lb 6.3 oz)   09/17/19 73.3 kg (161 lb 9.6 oz)     Weight at diagnosis was 75.2kg, Lazaro considers his baseline weight to be 173-175lbs before he initially got sick    General: Lazaro appears well, he is in good spirits and asks good questions. He is cushingoid.   HEENT: Skull is atrauamatic and normocephalic. PERRLA, sclera are non icteric and not injected, EOM are intact. Slight disconjugate gaze.  Nares are patent without drainage.  Oropharynx is clear without exudate, erythema or lesions.  Tympanic membranes are opaque bilaterally with light reflex and landmarks present.  Lymph:  Neck is supple without lymphadenopathy.  There is no supraclavicular, axillary or inguinal lymphadenopathy palpated.  Cardiovascular:  HR is regular, S1, S2 no murmur.  Capillary refill is < 2 seconds.  There is no edema.  Right arm is not obviously swollen, no pain or warmth, no collaterals noted. PICC was pulled prior to my exam. Pulses in right arm are strong.  Measurements of arms as follows (Lazaro is right handed)  Right arm at AC: 32 cm (up from 29cm); left arm at AC 29cm  Right bicep (10cm from AC): 34 cm (up from 32.5 cm); left bicep 30cm  Respiratory: Respirations are easy.  Lungs are clear to auscultation through out.  No crackles or wheezes.  Gastrointestinal:  BS present in all quadrants.  Abdomen is soft and non-tender.  No hepatosplenomegaly or masses are palpated.  Skin: PICC site on right arm without erythema or observable drainage.  Few scattered petechiae distal to the site.  Scattered maculopapular lesions on chest and back and upper arms.  Neurological:  CN 2-12 tested and intact with exception of mildly " disconjugate gaze. Gait is normal.  No issues with balance. Sensation intact in hands and feet.    Musculoskeletal:  Good strength and ROM in all extremities.  Strong dorsiflexion at ankles and great toes (5/5) bilaterally without any pain at the Achilles.    Labs:   Results for orders placed or performed in visit on 09/26/19 (from the past 24 hour(s))   CBC with platelets differential   Result Value Ref Range    WBC 7.6 4.0 - 11.0 10e9/L    RBC Count 5.04 4.4 - 5.9 10e12/L    Hemoglobin 13.1 (L) 13.3 - 17.7 g/dL    Hematocrit 41.3 40.0 - 53.0 %    MCV 82 78 - 100 fl    MCH 26.0 (L) 26.5 - 33.0 pg    MCHC 31.7 31.5 - 36.5 g/dL    RDW 13.8 10.0 - 15.0 %    Platelet Count 144 (L) 150 - 450 10e9/L    Diff Method Automated Method     % Neutrophils 89.7 %    % Lymphocytes 6.2 %    % Monocytes 2.3 %    % Eosinophils 0.0 %    % Basophils 0.1 %    % Immature Granulocytes 1.7 %    Nucleated RBCs 0 0 /100    Absolute Neutrophil 6.8 1.6 - 8.3 10e9/L    Absolute Lymphocytes 0.5 (L) 0.8 - 5.3 10e9/L    Absolute Monocytes 0.2 0.0 - 1.3 10e9/L    Absolute Eosinophils 0.0 0.0 - 0.7 10e9/L    Absolute Basophils 0.0 0.0 - 0.2 10e9/L    Abs Immature Granulocytes 0.1 0 - 0.4 10e9/L    Absolute Nucleated RBC 0.0      Recent Results (from the past 744 hour(s))   XR Chest 2 Views    Narrative    XR CHEST 2 VW  8/30/2019 4:23 PM      HISTORY: New diagnosis of ALL    COMPARISON: None    FINDINGS:   PA and lateral views of the chest. The cardiac silhouette size is  normal. There is a superior mediastinal mass. Question a few small  calcifications within the mass. There is a small left pleural  effusion. There are adjacent hazy pulmonary opacities in the left  lower lobe. The lungs are otherwise clear. The visualized upper  abdomen and bones are normal.      Impression    IMPRESSION:   1. Superior mediastinal mass.  2. Small left pleural effusion with adjacent left basilar opacities,  favoring atelectasis. Infection would be difficult to  exclude.    [Result: Mediastinal mass]    Finding was identified on 8/30/2019 4:25 PM.     Dr. Mitchell was contacted by Dr. Pink at 8/30/2019 4:28 PM and  verbalized understanding of the finding.     FATOUMATA PINK MD   IR Chest Tube Place Non Tunneled Left    Narrative    Procedures: Ultrasound-guided left chest tube placement, 8/31/2019.    Clinical indication: Recently diagnosed leukemia with large  mediastinal mass. Symptomatic large left pleural effusion.    Comparison studies: 8/30/2019.    PROCEDURE:        Interventional radiologists: Michell Escamilla MD (IR staff)    Fellow: Opal Isbell MD    Consent: verbal and written informed consent obtained prior to  procedure.    Procedure details: Patient placed in the seated position in the  operating room, monitored by the anesthesiology team but not given any  sedation due to concern for respiratory deterioration due to the large  mediastinal mass. The left chest was prepped and draped in standard  sterile fashion. Preprocedural ultrasound documented anechoic left  pleural effusion with adequate percutaneous window for drainage.  Timeout performed. 1% lidocaine was used to anesthetize the dermis and  proposed needle tract. Using ultrasound guidance, a [employer] catheter was  advanced into the pleural space. Image saved in the patient's  permanent medical record. The catheter was advanced off of the  stylette on the stylette removed. A Vubiquity wire was advanced through  the catheter into the pleural space. The catheter was removed over the  wire. The tract was dilated, followed by placement of 8 Mosotho Flexima  catheter. The inner dilator and wire were removed. The tube was  attached to chest tube chamber apparatus. Approximately 1.8 L of  serous fluid were drained. The catheter was secured to the skin using  a 2-0 Ethilon suture. No immediate complication.     Medications: 1% lidocaine.     Monitoring: The patient was placed on continuous monitoring. Vital  signs  and sedation monitored by anesthesiology staff. The patient  remained stable throughout the procedure.    Sedation time: None    Complications: None.      Impression    IMPRESSION:     Placement of 8 Spanish Flexima catheter into the left pleural space.  1.8 L of serous fluid drained.    PLAN:    Chest tube to -20 cm water suction.    I, MICHELL GALINDO MD, attest that I was present in the procedure room  for the entire procedure.    I have personally reviewed the examination and initial interpretation  and I agree with the findings.    MICHELL GALINDO MD   IR PICC Placement > 5 Yrs of Age    Narrative    This exam was marked as non-reportable because it will not be read by a   radiologist or a [place] non-radiologist provider.             XR Surgery AUTUMN L/T 5 Min Fluoro w Stills    Narrative    Procedures: Image-guided right upper extremity PICC line placement,  8/31/2019    Clinical indication: 19-year-old with recently diagnosed leukemia.  Need for central venous access for chemotherapy administration, but  patient has large mediastinal mass obstructing the central veins;  specifically the left innominate vein is completely compressed and the  right subclavian and innominate veins are patent but there is  extrinsic compression causing significant narrowing of the inferior  SVC.    Comparison studies: Outside CT chest.    PROCEDURE:        Interventional radiologists: Michell Escamilla MD (IR staff)    Fellow: Opal Isbell MD    Consent: verbal and written informed consent obtained prior to  procedure.    Procedure details: Patient placed in the seated position due to large  mediastinal mass. The right upper extremity was prepped and draped in  standard sterile fashion after preprocedural ultrasound with  tourniquet demonstrated patent superficial veins, including the  basilic vein. Timeout performed. 1% lidocaine was used to anesthetize  the dermis and proposed needle tract adjacent to the basilic vein.  Using  ultrasound guidance, a 21-gauge micropuncture needle was  advanced into the basilic vein. Image saved in the patient's chart. A  microguidewire was advanced into the basilic vein. The dilator with  peel-away sheath advanced over the wire. The C-arm was then used to  identify the needle as it was advanced into the upper SVC. A clamp was  used to measure the length of the catheter. The wire was then removed  and the sheath locked. The 4 Sri Lankan double lumen PICC line was then  cut to length (38 cm) and flushed with saline. It was advanced over  the peel-away sheath, which was subsequently removed. Fluoroscopy  confirmed positioning within the upper SVC, above the level of the SVC  stenosis due to extrinsic compression by the mass. The catheter was  secured to the skin using 3-0 Ethilon suture. A sterile dressing was  placed. No immediate complication.     Medications: Per anesthesiology team. 1% lidocaine without epinephrine  was used for local anesthesia.    Monitoring: The patient was placed on continuous monitoring. Vital  signs and sedation monitored by nursing staff under my supervision.  The patient remained stable throughout the procedure.    Sedation time: Not applicable.    Fluoroscopy time: 0.2 minutes    Complications: None.      Impression    IMPRESSION:     Placement of 4 Sri Lankan, dual-lumen PICC line via the right basilic  vein. The tip is positioned within the upper SVC, above the level of  the SVC stenosis caused by extrinsic compression by the mediastinal  mass.    PLAN:    Line available for immediate use.      I, STEVE GALINDO MD, attest that I was present in the procedure room  for the entire procedure.    I have personally reviewed the examination and initial interpretation  and I agree with the findings.    STEVE GALINDO MD   XR Chest Port 1 View    Narrative    XR CHEST PORT 1 VW  8/31/2019 8:17 PM      HISTORY: Follow up after drainage of pleural effusion    COMPARISON: Previous day    FINDINGS:    Portable upright view of the chest. Right arm PICC tip projects over  the high SVC. There is a new small left basilar pleural catheter  partially visualized. Decrease in left-sided pleural effusion. New  tiny left pneumothorax.    The cardiac silhouette size is not enlarged. Mediastinal mass  redemonstrated. Mild patchy left basilar opacities are decreased.      Impression    IMPRESSION:   Decreased pleural fluid with new tiny left pneumothorax following  pleural catheter placement.    FATOUMATA PINK MD   XR Chest Port 1 View    Narrative    XR CHEST PORT 1 VW  9/1/2019 6:58 AM      HISTORY: Monitor mediastinal mass, pleural effusion with chest tube in  place    COMPARISON: Previous day    FINDINGS:   Portable upright view of the chest. Right arm PICC tip projects over  the high SVC. Left basilar chest tube is stable in position. No  significant pneumothorax. Trace bilateral pleural effusions. The  cardiac silhouette size is stable. Mediastinal mass is grossly  unchanged from yesterday's examination.      Impression    IMPRESSION:   Trace amount of pleural fluid bilaterally, left basilar chest tube  stable in position.    FATOUMATA PINK MD   US Abdomen Complete Portable    Narrative    EXAMINATION: US ABDOMEN COMPLETE PORTABLE  9/1/2019 7:53 AM      CLINICAL HISTORY: evaluate for hepatosplenomegaly and lymphadenopathy  in newly diagnosed lymphoma patient    COMPARISON: None available.        FINDINGS:  The liver is normal in contour and echogenicity. The liver measures  16.3 cm in craniocaudal dimension. There is no intrahepatic or  extrahepatic biliary ductal dilatation. The common bile duct measures  3 mm. The gallbladder is normal, without gallstones, wall thickening,  or pericholecystic fluid.    The spleen measures maximally 11.6 cm and is normal in appearance. The  visualized portions of the pancreas are normal in echogenicity.    The visualized upper abdominal aorta and inferior vena cava are  normal.      The  kidneys are normal in position and demonstrate borderline  increased echogenicity. The right kidney measures 14.2 cm and the left  kidney measures 12.8 cm. There is no significant urinary tract  dilation. The urinary bladder is moderately distended with significant  echogenic debris. The bladder wall is normal.    There is a small right pleural effusion.      Impression    IMPRESSION:   1. Liver size at the upper limits of normal. No splenomegaly.  2. Borderline echogenic renal parenchyma, which can be seen with  medical renal disease. Asymmetric nephromegaly on the right.  3. Significant amount of debris within the bladder. Recommend  correlation with urinalysis.  4. Small right pleural effusion.    I have personally reviewed the examination and initial interpretation  and I agree with the findings.    FATOUMATA PINK MD   XR Chest Port 1 View    Narrative    XR CHEST PORT 1 VW  9/2/2019 8:28 AM      HISTORY: mediastinal mass    COMPARISON: Previous day    FINDINGS:   2 portable upright views of the chest obtained. Right arm PICC stable  in position projecting over the high SVC. Left chest tube in has  migrated laterally. There is a tiny left apical pneumothorax. There is  a trace amount of residual pleural fluid.    The cardiac silhouette size is normal. The mediastinal mass is similar  in size from comparison examination. No new focal pulmonary opacity.      Impression    IMPRESSION:   Chest tube has migrated somewhat laterally. Tiny left apical  pneumothorax. Trace residual pleural fluid.    FATOUMATA PINK MD   XR Chest Port 1 View    Narrative    HISTORY: Monitor mediastinal mass, pleural effusion, chest tube.    COMPARISON: 9/2/2019    FINDINGS: Portable upright chest at 6:51 AM. Tiny left apical  pneumothorax has nearly resolved. Right arm PICC tip in the high SVC.  Left pigtail chest tube is present and appears to be lower in position  than prior. No pleural fluid is noted. No focal pulmonary opacity.  Unchanged  appearance of the wide upper mediastinum. Normal heart size.      Impression    IMPRESSION: Nearly resolved left apical pneumothorax. Left chest tube  appears lower in position than prior, it is difficult to tell how  close the sideholes are to the chest wall. Attention on follow-up.    RYAN ANTONIO MD   XR Chest Port 1 View    Narrative    XR CHEST PORT 1 VW  9/4/2019 6:40 AM      HISTORY: Monitor mediastinal mass, pleural effusion with chest tube in  place    COMPARISON: Previous day    FINDINGS:   2 portable upright views of the chest. Right arm PICC tip projects  near the brachiocephalic confluence. Left basilar chest tube is stable  in position. Slight increase in small amount of pleural fluid. Tiny  left apical pneumothorax is unchanged.    The mediastinal mass has slightly decreased in size from presentation  on 8/30. The cardiac silhouette size is stable. From yesterday, there  has been an increase in left basilar opacities.      Impression    IMPRESSION:   1. From yesterday, slight increase in small left pleural effusion and  adjacent left basilar opacities, likely representing atelectasis.  2. Stable tiny left apical pneumothorax.  3. From presentation on 8/30, suspect slight decrease in size of a  mediastinal mass.    FATOUMATA PINK MD   XR Chest Port 1 View    Narrative    HISTORY: Mediastinal mass. Chest tube removal.    COMPARISON: 9/4/2019    FINDINGS: Upright portable chest at 12:02 PM. Right PICC tip projects  over the upper SVC. Left pigtail drain has been removed. Trace  bilateral pleural fluid is present. New lucency is present below the  left hemidiaphragm, this is thought to represent stomach. Continued  widening of the superior mediastinum. Slightly decreased left basilar  opacification with continued mild perihilar opacities.      Impression    IMPRESSION: No pneumothorax post left chest tube removal. Trace  bilateral pleural effusions. Decreased left basilar atelectasis.  Unchanged mediastinal  mass contours.    RYAN ANTONIO MD   XR Chest Port 1 View    Narrative    XR CHEST PORT 1 VW  9/5/2019 9:51 AM      HISTORY: Monitor mediastinal mass, pleural effusion with chest tube in  place    COMPARISON: Previous day    FINDINGS:   Portable upright view of the chest. Right arm PICC is stable in  position projecting over the high SVC. The cardiac silhouette size is  normal. Small bilateral pleural effusions are similar to the  comparison examination. There may be a tiny left apical pneumothorax.  Redemonstration of the mediastinal mass.      Impression    IMPRESSION:   Possible tiny left apical pneumothorax. Stable small pleural  effusions.    FATOUMATA PINK MD   XR Chest Port 1 View    Narrative    XR CHEST PORT 1 VW 9/6/2019 8:41 AM    CLINICAL HISTORY: Monitor mediastinal mass, pleural effusion with  chest tube in place    COMPARISON: 9/5/2019    FINDINGS:   Right PICC tip in the high SVC. Widened mediastinum is  unchanged. No chest tube identified. Lungs are clear. There are small  bilateral pleural effusions. The cardiac silhouette and pulmonary  vascularity are normal.      Impression    IMPRESSION: Clear lungs. Small pleural effusions.    BHAVYA CASTRO MD   CT Head w/o contrast*    Narrative    CT HEAD W/O CONTRAST 9/12/2019 11:38 AM    Provided History: new diagnosis t cell lymphoma, severe headache and  vomiting.  Eval for thrombus; T lymphoblastic lymphoma (H)  ICD-10: T lymphoblastic lymphoma (H)    Comparison: 1/25/2007.    Technique: Using multidetector thin collimation helical acquisition  technique, axial, coronal and sagittal CT images from the skull base  to the vertex were obtained without intravenous contrast.     Findings:      No intracranial hemorrhage, , mass effect, , midline shift,, or  extraaxial fluid collection.. The gray to white matter differentiation  of the cerebral hemispheres is preserved. Mild asymmetry of the  lateral ventricles, right greater than left. No sulcal effacement..     The basal cisterns are patent.    The visualized paranasal sinuses are clear. The mastoid air cells are  clear.            Impression    Impression: Mild asymmetry of the lateral ventricles without acute  intracranial pathology.    I have personally reviewed the examination and initial interpretation  and I agree with the findings.    CHIQUIS BARRON MD   US Extremity Upper Venous RT   Result Value    Radiologist flags Deep venous thrombosis (Urgent)    Narrative    HISTORY: 19-year-old male with T-cell lymphoma and increasing upper  extremity swelling on the right.    COMPARISON: None    FINDINGS: The right and left internal jugular veins are occluded with  thrombus. There is nonocclusive thrombus in the innominate veins  bilaterally, and the right subclavian vein. There is occlusive  thrombus in the right subclavian, proximal left subclavian, right  axillary, and one of the paired right brachial veins. There is  occlusive thrombus in the right basilic vein.      Impression    IMPRESSION: Extensive bilateral upper extremity deep venous thrombosis  as above.    [Urgent Result: Deep venous thrombosis]    Finding was identified on 9/26/2019 3:13 PM.     Dr. Reynaldo Campuzano was contacted by Dr. Cartwright at 9/26/2019 3:22 PM and  verbalized understanding of the urgent finding.     RYAN CARTWRIGHT MD       Assessment:  Lazaro Lund is a 19 year old young man with newly diagnosed T Cell lymphoblastic lymphoma (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  He is being treated per [facility] protocol [id number] and comes to clinic today for Day 24 of Induction therapy.  His presentation was complicated by a pleural and pericardial effusion with mass effect leading to cardiac tamponade.  Fortunately as his mass has responded to treatment these have improved.  His therapy is being modified to include dexamethasone (instead of prednisone) which is now considered standard of care.  He had a significant spinal headache following his last LP which is now  fully resolved. He is cushingoid secondary to steroids.      Since PICC line removal, he reports worsening of his right arm swelling which was verified with increased in his upper extremity measurements. Due to concern for upper extremity DVT, US was obtained and showed extensive bilateral upper extremity DVTs. His blood counts look good.     Plan:   1. Reviewed labs and US results with Lazaro. Given the extent of his clot burden, recommended that he be admitted for initiation and teaching for anticoagulation with lovenox.  2. Given significant spinal headache will plan for IV caffeine following LPs in the future.   3. Cardiology follow up next month given presenting cardiac tamponade and pericardial effusion along with recent abnormal EKG.  4. Continue dexamethasone until 9/28 AM.  Lazaro missed a small amount of his doses since he was taking 2mg BID (instead of 6mg BID) for two doses upon discharge.  He is taking appropriate dose now and will still plan to finish on 9/28.  5. Continue bactrim for PCP ppx through out therapy.  6. TPMT shows Intermediate activity.   7. Plan to check Vitamin D level at the end of Induction.  8. Given Lazaro's marrow was negative at diagnosis he will not require a repeat marrow in the future.  9. Plan to repeat imaging with neck, chest, abdomen, pelvis CT at the end of Induction.  He will also have a port placed at that time.  Rx sent locally for emla and instructed on use.  10. Will contact [facility] regarding Lazaro's upcoming need for home cytarabine.  11. Supportive care for folliculitis, avoid picking, would expect this to resolve as he comes off steroids.  12. Admit to hospital for initiation and teaching for anticoagulation with lovenox. Next clinic visit scheduled for 10/1/19 to include labs, exam, and restaging scans      Reynaldo Campuzano MD  Pediatric Hematology/Oncology  [facility]  Pager [phone]        Reynaldo Campuzano MD

## 2019-09-26 NOTE — PROGRESS NOTES
[facility], formerly [facility]   Pediatric Hematology/Oncology Clinic Note    Lazaro Lund is a 19 year old young man with newly diagnosed T-cell lymphoblastic lymphoma. Lazaro presented with acute onset cough and was found to have an anterior mediastinal mass and malignant left-sided pleural effusion.  A CT guided biopsy was obtained at [facility] and pathology was consistent with T-cell leukemia vs lymphoma.  He was admitted to [facility] oncology service 8/30 and started on treatment per [id number].  He had a pleural effusion that required a chest tube and a pericardial effusion that was not drained. His Induction was complicated by cardiac compression secondary to his mass leading to tamponade, this improved through out his hospital stay.  He also had dysphagia that improved with treatment of his mass, swallow study was normal.    Lazaro comes to clinic today for Day 24 of Induction therapy. Lazaro reports he's been doing pretty well since his last visit.  His energy level remains good. He continues to ambulate without difficulty.  He is eating well.       Lazaro has noted a rash on his arms and chest that is stable to slightly worse today.  He also notes swelling in his right arm that had previously improved with elevation, but now seems to be getting worse and clear drainage coming from the site of his PICC line. He does not have pain, no warmth or redness.    Lazaro denies fever and other symptoms of illness. Continues to pass stool daily, utilizes senna every other day.  Denies paresthesias, getting around without difficulty. No dysphagia. Sleeping well, taking melatonin and benadryl at HS.    Review of systems:  Remainder of ROS is complete an negative    PMH:   Past Medical History:   Diagnosis Date     Migraine 2006    have resolved     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       [facility]:   Family History   Problem Relation Age of Onset     No Known Problems Mother      No Known Problems  Father      Asthma Brother      Thyroid Cancer Paternal Grandmother      Melanoma Paternal Aunt        Social History: Lazaro previously lived at home with his dad and step mom in [place] but is now staying with his mom in [place].  He was working full time at [employer] in [place] prior to his diagnosis, but has since had to quit his job.    Current Medications:  Current Outpatient Medications   Medication Sig Dispense Refill     dexamethasone (DECADRON) 6 MG tablet Take 1 tablet (6 mg) by mouth every 12 hours for 19 days 38 tablet 0     diphenhydrAMINE (BENADRYL) 25 MG capsule Take 1-2 capsules (25-50 mg) by mouth every 6 hours as needed (Breakthrough Nausea and Vomiting ) 30 capsule 1     lidocaine-prilocaine (EMLA) 2.5-2.5 % external cream Apply topically as needed for other (apply prior to port access) 30 g 11     melatonin 3 MG tablet Take 1 tablet (3 mg) by mouth At Bedtime 30 tablet 1     NO ACTIVE MEDICATIONS .       ondansetron (ZOFRAN) 8 MG tablet Take 1 tablet (8 mg) by mouth every 6 hours as needed for nausea 30 tablet 1     oxyCODONE (ROXICODONE) 5 MG tablet Take 1 tablet (5 mg) by mouth every 6 hours as needed for moderate to severe pain 10 tablet 0     pantoprazole (PROTONIX) 40 MG EC tablet Take 1 tablet (40 mg) by mouth daily for 20 days . This acid blocker helps prevent stomach pain while on your decadron chemotherapy. 20 tablet 0     polyethylene glycol (MIRALAX/GLYCOLAX) packet Take 17 g by mouth daily 30 packet 0     sennosides (SENOKOT) 8.6 MG tablet Take 2 tablets by mouth 2 times daily as needed for constipation 60 each 1     sulfamethoxazole-trimethoprim (BACTRIM DS/SEPTRA DS) 800-160 MG tablet Take 1 tablet by mouth Every Mon, Tues two times daily 16 tablet 0   Above medications reviewed, no missed doses of dexamethasone.     Physical Exam:   /70 (BP Location: Left arm, Patient Position: Chair, Cuff Size: Adult Large)   Pulse 73   Temp 98.3  F (36.8  C) (Oral)   Resp 20   Ht  "1.836 m (6' 0.28\")   Wt 77.9 kg (171 lb 11.8 oz)   SpO2 99%   BMI 23.11 kg/m      Wt Readings from Last 4 Encounters:   09/26/19 77.9 kg (171 lb 11.8 oz)   09/24/19 79 kg (174 lb 2.6 oz)   09/19/19 72.3 kg (159 lb 6.3 oz)   09/17/19 73.3 kg (161 lb 9.6 oz)     Weight at diagnosis was 75.2kg, Lazaro considers his baseline weight to be 173-175lbs before he initially got sick    General: Lazaro appears well, he is in good spirits and asks good questions. He is cushingoid.   HEENT: Skull is atrauamatic and normocephalic. PERRLA, sclera are non icteric and not injected, EOM are intact. Slight disconjugate gaze.  Nares are patent without drainage.  Oropharynx is clear without exudate, erythema or lesions.  Tympanic membranes are opaque bilaterally with light reflex and landmarks present.  Lymph:  Neck is supple without lymphadenopathy.  There is no supraclavicular, axillary or inguinal lymphadenopathy palpated.  Cardiovascular:  HR is regular, S1, S2 no murmur.  Capillary refill is < 2 seconds.  There is no edema.  Right arm is not obviously swollen, no pain or warmth, no collaterals noted. PICC was pulled prior to my exam. Pulses in right arm are strong.  Measurements of arms as follows (Lazaro is right handed)  Right arm at AC: 32 cm (up from 29cm); left arm at AC 29cm  Right bicep (10cm from AC): 34 cm (up from 32.5 cm); left bicep 30cm  Respiratory: Respirations are easy.  Lungs are clear to auscultation through out.  No crackles or wheezes.  Gastrointestinal:  BS present in all quadrants.  Abdomen is soft and non-tender.  No hepatosplenomegaly or masses are palpated.  Skin: PICC site on right arm without erythema or observable drainage.  Few scattered petechiae distal to the site.  Scattered maculopapular lesions on chest and back and upper arms.  Neurological:  CN 2-12 tested and intact with exception of mildly disconjugate gaze. Gait is normal.  No issues with balance. Sensation intact in hands and feet.  "   Musculoskeletal:  Good strength and ROM in all extremities.  Strong dorsiflexion at ankles and great toes (5/5) bilaterally without any pain at the Achilles.    Labs:   Results for orders placed or performed in visit on 09/26/19 (from the past 24 hour(s))   CBC with platelets differential   Result Value Ref Range    WBC 7.6 4.0 - 11.0 10e9/L    RBC Count 5.04 4.4 - 5.9 10e12/L    Hemoglobin 13.1 (L) 13.3 - 17.7 g/dL    Hematocrit 41.3 40.0 - 53.0 %    MCV 82 78 - 100 fl    MCH 26.0 (L) 26.5 - 33.0 pg    MCHC 31.7 31.5 - 36.5 g/dL    RDW 13.8 10.0 - 15.0 %    Platelet Count 144 (L) 150 - 450 10e9/L    Diff Method Automated Method     % Neutrophils 89.7 %    % Lymphocytes 6.2 %    % Monocytes 2.3 %    % Eosinophils 0.0 %    % Basophils 0.1 %    % Immature Granulocytes 1.7 %    Nucleated RBCs 0 0 /100    Absolute Neutrophil 6.8 1.6 - 8.3 10e9/L    Absolute Lymphocytes 0.5 (L) 0.8 - 5.3 10e9/L    Absolute Monocytes 0.2 0.0 - 1.3 10e9/L    Absolute Eosinophils 0.0 0.0 - 0.7 10e9/L    Absolute Basophils 0.0 0.0 - 0.2 10e9/L    Abs Immature Granulocytes 0.1 0 - 0.4 10e9/L    Absolute Nucleated RBC 0.0      Recent Results (from the past 744 hour(s))   XR Chest 2 Views    Narrative    XR CHEST 2 VW  8/30/2019 4:23 PM      HISTORY: New diagnosis of ALL    COMPARISON: None    FINDINGS:   PA and lateral views of the chest. The cardiac silhouette size is  normal. There is a superior mediastinal mass. Question a few small  calcifications within the mass. There is a small left pleural  effusion. There are adjacent hazy pulmonary opacities in the left  lower lobe. The lungs are otherwise clear. The visualized upper  abdomen and bones are normal.      Impression    IMPRESSION:   1. Superior mediastinal mass.  2. Small left pleural effusion with adjacent left basilar opacities,  favoring atelectasis. Infection would be difficult to exclude.    [Result: Mediastinal mass]    Finding was identified on 8/30/2019 4:25 PM.       [facility] Pink at 8/30/2019 4:28 PM and  verbalized understanding of the finding.     FATOUMATA PINK MD   IR Chest Tube Place Non Tunneled Left    Narrative    Procedures: Ultrasound-guided left chest tube placement, 8/31/2019.    Clinical indication: Recently diagnosed leukemia with large  mediastinal mass. Symptomatic large left pleural effusion.    Comparison studies: 8/30/2019.    PROCEDURE:        Interventional radiologists: Michell Escamilla MD (IR staff)    Fellow: Opal Isbell MD    Consent: verbal and written informed consent obtained prior to  procedure.    Procedure details: Patient placed in the seated position in the  operating room, monitored by the anesthesiology team but not given any  sedation due to concern for respiratory deterioration due to the large  mediastinal mass. The left chest was prepped and draped in standard  sterile fashion. Preprocedural ultrasound documented anechoic left  pleural effusion with adequate percutaneous window for drainage.  Timeout performed. 1% lidocaine was used to anesthetize the dermis and  proposed needle tract. Using ultrasound guidance, a [employer] catheter was  advanced into the pleural space. Image saved in the patient's  permanent medical record. The catheter was advanced off of the  stylette on the stylette removed. A [employer] wire was advanced through  the catheter into the pleural space. The catheter was removed over the  wire. The tract was dilated, followed by placement of 8 Sinhala Flexima  catheter. The inner dilator and wire were removed. The tube was  attached to chest tube chamber apparatus. Approximately 1.8 L of  serous fluid were drained. The catheter was secured to the skin using  a 2-0 Ethilon suture. No immediate complication.     Medications: 1% lidocaine.     Monitoring: The patient was placed on continuous monitoring. Vital  signs and sedation monitored by anesthesiology staff. The patient  remained stable throughout the  procedure.    Sedation time: None    Complications: None.      Impression    IMPRESSION:     Placement of 8 Armenian Flexima catheter into the left pleural space.  1.8 L of serous fluid drained.    PLAN:    Chest tube to -20 cm water suction.    I, MICHELL GALINDO MD, attest that I was present in the procedure room  for the entire procedure.    I have personally reviewed the examination and initial interpretation  and I agree with the findings.    MICHELL GALINDO MD   IR PICC Placement > 5 Yrs of Age    Narrative    This exam was marked as non-reportable because it will not be read by a   radiologist or a [place] non-radiologist provider.             XR Surgery AUTUMN L/T 5 Min Fluoro w Stills    Narrative    Procedures: Image-guided right upper extremity PICC line placement,  8/31/2019    Clinical indication: 19-year-old with recently diagnosed leukemia.  Need for central venous access for chemotherapy administration, but  patient has large mediastinal mass obstructing the central veins;  specifically the left innominate vein is completely compressed and the  right subclavian and innominate veins are patent but there is  extrinsic compression causing significant narrowing of the inferior  SVC.    Comparison studies: Outside CT chest.    PROCEDURE:        Interventional radiologists: Michell Escamilla MD (IR staff)    Fellow: Opal Isbell MD    Consent: verbal and written informed consent obtained prior to  procedure.    Procedure details: Patient placed in the seated position due to large  mediastinal mass. The right upper extremity was prepped and draped in  standard sterile fashion after preprocedural ultrasound with  tourniquet demonstrated patent superficial veins, including the  basilic vein. Timeout performed. 1% lidocaine was used to anesthetize  the dermis and proposed needle tract adjacent to the basilic vein.  Using ultrasound guidance, a 21-gauge micropuncture needle was  advanced into the basilic vein. Image  saved in the patient's chart. A  microguidewire was advanced into the basilic vein. The dilator with  peel-away sheath advanced over the wire. The C-arm was then used to  identify the needle as it was advanced into the upper SVC. A clamp was  used to measure the length of the catheter. The wire was then removed  and the sheath locked. The 4 Japanese double lumen PICC line was then  cut to length (38 cm) and flushed with saline. It was advanced over  the peel-away sheath, which was subsequently removed. Fluoroscopy  confirmed positioning within the upper SVC, above the level of the SVC  stenosis due to extrinsic compression by the mass. The catheter was  secured to the skin using 3-0 Ethilon suture. A sterile dressing was  placed. No immediate complication.     Medications: Per anesthesiology team. 1% lidocaine without epinephrine  was used for local anesthesia.    Monitoring: The patient was placed on continuous monitoring. Vital  signs and sedation monitored by nursing staff under my supervision.  The patient remained stable throughout the procedure.    Sedation time: Not applicable.    Fluoroscopy time: 0.2 minutes    Complications: None.      Impression    IMPRESSION:     Placement of 4 Japanese, dual-lumen PICC line via the right basilic  vein. The tip is positioned within the upper SVC, above the level of  the SVC stenosis caused by extrinsic compression by the mediastinal  mass.    PLAN:    Line available for immediate use.      I, STEVE GALINDO MD, attest that I was present in the procedure room  for the entire procedure.    I have personally reviewed the examination and initial interpretation  and I agree with the findings.    STEVE GALINDO MD   XR Chest Port 1 View    Narrative    XR CHEST PORT 1 VW  8/31/2019 8:17 PM      HISTORY: Follow up after drainage of pleural effusion    COMPARISON: Previous day    FINDINGS:   Portable upright view of the chest. Right arm PICC tip projects over  the high SVC. There is a  new small left basilar pleural catheter  partially visualized. Decrease in left-sided pleural effusion. New  tiny left pneumothorax.    The cardiac silhouette size is not enlarged. Mediastinal mass  redemonstrated. Mild patchy left basilar opacities are decreased.      Impression    IMPRESSION:   Decreased pleural fluid with new tiny left pneumothorax following  pleural catheter placement.    FATOUMATA PINK MD   XR Chest Port 1 View    Narrative    XR CHEST PORT 1 VW  9/1/2019 6:58 AM      HISTORY: Monitor mediastinal mass, pleural effusion with chest tube in  place    COMPARISON: Previous day    FINDINGS:   Portable upright view of the chest. Right arm PICC tip projects over  the high SVC. Left basilar chest tube is stable in position. No  significant pneumothorax. Trace bilateral pleural effusions. The  cardiac silhouette size is stable. Mediastinal mass is grossly  unchanged from yesterday's examination.      Impression    IMPRESSION:   Trace amount of pleural fluid bilaterally, left basilar chest tube  stable in position.    FATOUMATA PINK MD   US Abdomen Complete Portable    Narrative    EXAMINATION: US ABDOMEN COMPLETE PORTABLE  9/1/2019 7:53 AM      CLINICAL HISTORY: evaluate for hepatosplenomegaly and lymphadenopathy  in newly diagnosed lymphoma patient    COMPARISON: None available.        FINDINGS:  The liver is normal in contour and echogenicity. The liver measures  16.3 cm in craniocaudal dimension. There is no intrahepatic or  extrahepatic biliary ductal dilatation. The common bile duct measures  3 mm. The gallbladder is normal, without gallstones, wall thickening,  or pericholecystic fluid.    The spleen measures maximally 11.6 cm and is normal in appearance. The  visualized portions of the pancreas are normal in echogenicity.    The visualized upper abdominal aorta and inferior vena cava are  normal.      The kidneys are normal in position and demonstrate borderline  increased echogenicity. The right  kidney measures 14.2 cm and the left  kidney measures 12.8 cm. There is no significant urinary tract  dilation. The urinary bladder is moderately distended with significant  echogenic debris. The bladder wall is normal.    There is a small right pleural effusion.      Impression    IMPRESSION:   1. Liver size at the upper limits of normal. No splenomegaly.  2. Borderline echogenic renal parenchyma, which can be seen with  medical renal disease. Asymmetric nephromegaly on the right.  3. Significant amount of debris within the bladder. Recommend  correlation with urinalysis.  4. Small right pleural effusion.    I have personally reviewed the examination and initial interpretation  and I agree with the findings.    FATOUMATA PINK MD   XR Chest Port 1 View    Narrative    XR CHEST PORT 1 VW  9/2/2019 8:28 AM      HISTORY: mediastinal mass    COMPARISON: Previous day    FINDINGS:   2 portable upright views of the chest obtained. Right arm PICC stable  in position projecting over the high SVC. Left chest tube in has  migrated laterally. There is a tiny left apical pneumothorax. There is  a trace amount of residual pleural fluid.    The cardiac silhouette size is normal. The mediastinal mass is similar  in size from comparison examination. No new focal pulmonary opacity.      Impression    IMPRESSION:   Chest tube has migrated somewhat laterally. Tiny left apical  pneumothorax. Trace residual pleural fluid.    FATOUMATA PINK MD   XR Chest Port 1 View    Narrative    HISTORY: Monitor mediastinal mass, pleural effusion, chest tube.    COMPARISON: 9/2/2019    FINDINGS: Portable upright chest at 6:51 AM. Tiny left apical  pneumothorax has nearly resolved. Right arm PICC tip in the high SVC.  Left pigtail chest tube is present and appears to be lower in position  than prior. No pleural fluid is noted. No focal pulmonary opacity.  Unchanged appearance of the wide upper mediastinum. Normal heart size.      Impression    IMPRESSION:  Nearly resolved left apical pneumothorax. Left chest tube  appears lower in position than prior, it is difficult to tell how  close the sideholes are to the chest wall. Attention on follow-up.    YRAN ANTONIO MD   XR Chest Port 1 View    Narrative    XR CHEST PORT 1 VW  9/4/2019 6:40 AM      HISTORY: Monitor mediastinal mass, pleural effusion with chest tube in  place    COMPARISON: Previous day    FINDINGS:   2 portable upright views of the chest. Right arm PICC tip projects  near the brachiocephalic confluence. Left basilar chest tube is stable  in position. Slight increase in small amount of pleural fluid. Tiny  left apical pneumothorax is unchanged.    The mediastinal mass has slightly decreased in size from presentation  on 8/30. The cardiac silhouette size is stable. From yesterday, there  has been an increase in left basilar opacities.      Impression    IMPRESSION:   1. From yesterday, slight increase in small left pleural effusion and  adjacent left basilar opacities, likely representing atelectasis.  2. Stable tiny left apical pneumothorax.  3. From presentation on 8/30, suspect slight decrease in size of a  mediastinal mass.    FATOUMATA PINK MD   XR Chest Port 1 View    Narrative    HISTORY: Mediastinal mass. Chest tube removal.    COMPARISON: 9/4/2019    FINDINGS: Upright portable chest at 12:02 PM. Right PICC tip projects  over the upper SVC. Left pigtail drain has been removed. Trace  bilateral pleural fluid is present. New lucency is present below the  left hemidiaphragm, this is thought to represent stomach. Continued  widening of the superior mediastinum. Slightly decreased left basilar  opacification with continued mild perihilar opacities.      Impression    IMPRESSION: No pneumothorax post left chest tube removal. Trace  bilateral pleural effusions. Decreased left basilar atelectasis.  Unchanged mediastinal mass contours.    RYAN ANTONIO MD   XR Chest Port 1 View    Narrative    XR CHEST PORT 1 VW   9/5/2019 9:51 AM      HISTORY: Monitor mediastinal mass, pleural effusion with chest tube in  place    COMPARISON: Previous day    FINDINGS:   Portable upright view of the chest. Right arm PICC is stable in  position projecting over the high SVC. The cardiac silhouette size is  normal. Small bilateral pleural effusions are similar to the  comparison examination. There may be a tiny left apical pneumothorax.  Redemonstration of the mediastinal mass.      Impression    IMPRESSION:   Possible tiny left apical pneumothorax. Stable small pleural  effusions.    FATOUMATA PINK MD   XR Chest Port 1 View    Narrative    XR CHEST PORT 1 VW 9/6/2019 8:41 AM    CLINICAL HISTORY: Monitor mediastinal mass, pleural effusion with  chest tube in place    COMPARISON: 9/5/2019    FINDINGS:   Right PICC tip in the high SVC. Widened mediastinum is  unchanged. No chest tube identified. Lungs are clear. There are small  bilateral pleural effusions. The cardiac silhouette and pulmonary  vascularity are normal.      Impression    IMPRESSION: Clear lungs. Small pleural effusions.    BHAVYA CASTRO MD   CT Head w/o contrast*    Narrative    CT HEAD W/O CONTRAST 9/12/2019 11:38 AM    Provided History: new diagnosis t cell lymphoma, severe headache and  vomiting.  Eval for thrombus; T lymphoblastic lymphoma (H)  ICD-10: T lymphoblastic lymphoma (H)    Comparison: 1/25/2007.    Technique: Using multidetector thin collimation helical acquisition  technique, axial, coronal and sagittal CT images from the skull base  to the vertex were obtained without intravenous contrast.     Findings:      No intracranial hemorrhage, , mass effect, , midline shift,, or  extraaxial fluid collection.. The gray to white matter differentiation  of the cerebral hemispheres is preserved. Mild asymmetry of the  lateral ventricles, right greater than left. No sulcal effacement..    The basal cisterns are patent.    The visualized paranasal sinuses are clear. The mastoid  air cells are  clear.            Impression    Impression: Mild asymmetry of the lateral ventricles without acute  intracranial pathology.    I have personally reviewed the examination and initial interpretation  and I agree with the findings.    CHIQUIS BARRON MD   US Extremity Upper Venous RT   Result Value    Radiologist flags Deep venous thrombosis (Urgent)    Narrative    HISTORY: 19-year-old male with T-cell lymphoma and increasing upper  extremity swelling on the right.    COMPARISON: None    FINDINGS: The right and left internal jugular veins are occluded with  thrombus. There is nonocclusive thrombus in the innominate veins  bilaterally, and the right subclavian vein. There is occlusive  thrombus in the right subclavian, proximal left subclavian, right  axillary, and one of the paired right brachial veins. There is  occlusive thrombus in the right basilic vein.      Impression    IMPRESSION: Extensive bilateral upper extremity deep venous thrombosis  as above.    [Urgent Result: Deep venous thrombosis]    Finding was identified on 9/26/2019 3:13 PM.     Dr. Reynaldo Campuzano was contacted by Dr. Cartwright at 9/26/2019 3:22 PM and  verbalized understanding of the urgent finding.     RYAN CARTWRIGHT MD       Assessment:  Lazaro Lund is a 19 year old young man with newly diagnosed T Cell lymphoblastic lymphoma (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  He is being treated per [facility] protocol [id number] and comes to clinic today for Day 24 of Induction therapy.  His presentation was complicated by a pleural and pericardial effusion with mass effect leading to cardiac tamponade.  Fortunately as his mass has responded to treatment these have improved.  His therapy is being modified to include dexamethasone (instead of prednisone) which is now considered standard of care.  He had a significant spinal headache following his last LP which is now fully resolved. He is cushingoid secondary to steroids.      Since PICC line removal, he reports  worsening of his right arm swelling which was verified with increased in his upper extremity measurements. Due to concern for upper extremity DVT, US was obtained and showed extensive bilateral upper extremity DVTs. His blood counts look good.     Plan:   1. Reviewed labs and US results with Lazaro. Given the extent of his clot burden, recommended that he be admitted for initiation and teaching for anticoagulation with lovenox.  2. Given significant spinal headache will plan for IV caffeine following LPs in the future.   3. Cardiology follow up next month given presenting cardiac tamponade and pericardial effusion along with recent abnormal EKG.  4. Continue dexamethasone until 9/28 AM.  Lazaro missed a small amount of his doses since he was taking 2mg BID (instead of 6mg BID) for two doses upon discharge.  He is taking appropriate dose now and will still plan to finish on 9/28.  5. Continue bactrim for PCP ppx through out therapy.  6. TPMT shows Intermediate activity.   7. Plan to check Vitamin D level at the end of Induction.  8. Given Lazaro's marrow was negative at diagnosis he will not require a repeat marrow in the future.  9. Plan to repeat imaging with neck, chest, abdomen, pelvis CT at the end of Induction.  He will also have a port placed at that time.  Rx sent locally for emla and instructed on use.  10. Will contact [facility] regarding Lazaro's upcoming need for home cytarabine.  11. Supportive care for folliculitis, avoid picking, would expect this to resolve as he comes off steroids.  12. Admit to hospital for initiation and teaching for anticoagulation with lovenox. Next clinic visit scheduled for 10/1/19 to include labs, exam, and restaging scans      Reynaldo Campuzano MD  Pediatric Hematology/Oncology  [facility]  Pager [phone]

## 2019-09-27 ENCOUNTER — APPOINTMENT (OUTPATIENT)
Dept: CT IMAGING | Facility: CLINIC | Age: 19
DRG: 300 | End: 2019-09-27
Attending: PEDIATRICS
Payer: COMMERCIAL

## 2019-09-27 DIAGNOSIS — C83.50 T LYMPHOBLASTIC LYMPHOMA (H): Primary | ICD-10-CM

## 2019-09-27 LAB
ERYTHROCYTE [DISTWIDTH] IN BLOOD BY AUTOMATED COUNT: 13.8 % (ref 10–15)
HCT VFR BLD AUTO: 39.1 % (ref 40–53)
HGB BLD-MCNC: 12.3 G/DL (ref 13.3–17.7)
LMWH PPP CHRO-ACNC: 1.08 IU/ML
LMWH PPP CHRO-ACNC: 1.26 IU/ML
MCH RBC QN AUTO: 25.8 PG (ref 26.5–33)
MCHC RBC AUTO-ENTMCNC: 31.5 G/DL (ref 31.5–36.5)
MCV RBC AUTO: 82 FL (ref 78–100)
MRSA DNA SPEC QL NAA+PROBE: NEGATIVE
PLATELET # BLD AUTO: 136 10E9/L (ref 150–450)
RBC # BLD AUTO: 4.77 10E12/L (ref 4.4–5.9)
SPECIMEN SOURCE: NORMAL
WBC # BLD AUTO: 7.3 10E9/L (ref 4–11)

## 2019-09-27 PROCEDURE — 25000131 ZZH RX MED GY IP 250 OP 636 PS 637: Performed by: STUDENT IN AN ORGANIZED HEALTH CARE EDUCATION/TRAINING PROGRAM

## 2019-09-27 PROCEDURE — 85520 HEPARIN ASSAY: CPT | Performed by: PEDIATRICS

## 2019-09-27 PROCEDURE — 25000128 H RX IP 250 OP 636: Performed by: PEDIATRICS

## 2019-09-27 PROCEDURE — 40000257 ZZH STATISTIC CONSULT NO CHARGE VASC ACCESS

## 2019-09-27 PROCEDURE — 20300000 ZZH R&B PICU UMMC

## 2019-09-27 PROCEDURE — 25000132 ZZH RX MED GY IP 250 OP 250 PS 637

## 2019-09-27 PROCEDURE — 36415 COLL VENOUS BLD VENIPUNCTURE: CPT | Performed by: STUDENT IN AN ORGANIZED HEALTH CARE EDUCATION/TRAINING PROGRAM

## 2019-09-27 PROCEDURE — 25000128 H RX IP 250 OP 636

## 2019-09-27 PROCEDURE — 25000125 ZZHC RX 250: Performed by: PEDIATRICS

## 2019-09-27 PROCEDURE — 85520 HEPARIN ASSAY: CPT | Performed by: STUDENT IN AN ORGANIZED HEALTH CARE EDUCATION/TRAINING PROGRAM

## 2019-09-27 PROCEDURE — 25000125 ZZHC RX 250

## 2019-09-27 PROCEDURE — 87641 MR-STAPH DNA AMP PROBE: CPT | Performed by: PEDIATRICS

## 2019-09-27 PROCEDURE — G0378 HOSPITAL OBSERVATION PER HR: HCPCS

## 2019-09-27 PROCEDURE — 96372 THER/PROPH/DIAG INJ SC/IM: CPT

## 2019-09-27 PROCEDURE — 87640 STAPH A DNA AMP PROBE: CPT | Performed by: PEDIATRICS

## 2019-09-27 PROCEDURE — 71275 CT ANGIOGRAPHY CHEST: CPT

## 2019-09-27 PROCEDURE — 96365 THER/PROPH/DIAG IV INF INIT: CPT

## 2019-09-27 PROCEDURE — 85027 COMPLETE CBC AUTOMATED: CPT | Performed by: PEDIATRICS

## 2019-09-27 PROCEDURE — 36415 COLL VENOUS BLD VENIPUNCTURE: CPT | Performed by: PEDIATRICS

## 2019-09-27 RX ORDER — HEPARIN SODIUM 10000 [USP'U]/100ML
0-3500 INJECTION, SOLUTION INTRAVENOUS CONTINUOUS
Status: DISCONTINUED | OUTPATIENT
Start: 2019-09-27 | End: 2019-09-28

## 2019-09-27 RX ORDER — IOPAMIDOL 755 MG/ML
100 INJECTION, SOLUTION INTRAVASCULAR ONCE
Status: COMPLETED | OUTPATIENT
Start: 2019-09-27 | End: 2019-09-27

## 2019-09-27 RX ORDER — LIDOCAINE 40 MG/G
CREAM TOPICAL
Status: COMPLETED
Start: 2019-09-27 | End: 2019-09-27

## 2019-09-27 RX ADMIN — LIDOCAINE: 40 CREAM TOPICAL at 20:37

## 2019-09-27 RX ADMIN — Medication 3 MG: at 21:07

## 2019-09-27 RX ADMIN — SODIUM CHLORIDE 84 ML: 9 INJECTION, SOLUTION INTRAVENOUS at 09:30

## 2019-09-27 RX ADMIN — IOPAMIDOL 64 ML: 755 INJECTION, SOLUTION INTRAVENOUS at 09:29

## 2019-09-27 RX ADMIN — HEPARIN SODIUM 1400 UNITS/HR: 10000 INJECTION, SOLUTION INTRAVENOUS at 13:50

## 2019-09-27 RX ADMIN — ACETAMINOPHEN 650 MG: 325 TABLET, FILM COATED ORAL at 12:32

## 2019-09-27 RX ADMIN — POLYETHYLENE GLYCOL 3350 17 G: 17 POWDER, FOR SOLUTION ORAL at 08:33

## 2019-09-27 RX ADMIN — ENOXAPARIN SODIUM 80 MG: 80 INJECTION SUBCUTANEOUS at 08:22

## 2019-09-27 RX ADMIN — DEXAMETHASONE 6 MG: 2 TABLET ORAL at 20:35

## 2019-09-27 RX ADMIN — DEXAMETHASONE 6 MG: 2 TABLET ORAL at 08:22

## 2019-09-27 RX ADMIN — ACETAMINOPHEN 650 MG: 325 TABLET, FILM COATED ORAL at 20:33

## 2019-09-27 RX ADMIN — DIPHENHYDRAMINE HYDROCHLORIDE 25 MG: 25 CAPSULE ORAL at 21:07

## 2019-09-27 RX ADMIN — PANTOPRAZOLE SODIUM 40 MG: 40 TABLET, DELAYED RELEASE ORAL at 08:22

## 2019-09-27 NOTE — PLAN OF CARE
Transfer from 5th floor, arrived to unit at 1220.  A&O, appropriate, independent.  VSS, afebrile, RA.  Tylenol x1 for headache.   Face swollen w/ right side worse than left, right arm swollen & tender.  Heparin gtt initiated at 1400.

## 2019-09-27 NOTE — PROGRESS NOTES
[facility], [place]    Progress Note - PICU Service        Date of Admission:  9/26/2019    Assessment & Plan   Lazaro Lund is a 19 year old male with newly diagnosed T-cell lymphoblastic lymphoma, treated per COG protocol [id number] currently Day 24 of induction therapy. He was admitted on 9/26 with 6 days of right arm swelling following a PICC removal on 9/24 found to have extensive bilateral upper extremity thrombi. Given concern for progression he was transferred to the PICU for heparin initiation.      Heme-Onc  T-cell Lymphoblastic lymphoma  - Decadron 6mg BID, finishing 9/28  - Benadryl 25 mg q6 prn  - Zofran 8 mg q6 prn     Multiple bilateral thrombi of upper extremities  - Stop Lovenox (Previosuly 80 mg/dose BID).   - Start heparin per heme recs. Will await plan.  - Repeat US early next week     CV/Resp  Pinching chest pain, suspect precordial catch  At risk for PE  - Vitals Q4H  - Close monitoring for respiratory signs/symptoms     ID  PCP prophylaxis  - Continue Bactrim M,T     GI  GI prophylaxis  - Protonix 40 mg daily     Constipation  - PTA Senna BID PRN  - PTA Miralax 17 g PRN     SKIN  Truncal and extremity folliculitis  Purpuric right arm lesion at former PICC site  - Continue to monitor     NEURO   Pain/discomfort   - Tylenol q6 prn  - Held home oxycodone 5 mg q6 prn on admission; MD to assess if in significant pain     FEN  - Regular diet  - Monitor I/Os     Diet: Peds Diet Age 9-18 yrs    Fluids: None  Lines: PIV  DVT Prophylaxis: Heparin therapy as above  Lara Catheter: not present  Code Status: FULL CODE    Disposition Plan   Pending stable heparin dosing before transfer back to the floor    This patient was discussed with Dr. Gregory, pediatric intensivist. All aspects of the exam & documentation were approved by the attending physician.     Juancarlos Gómez MD  Pediatrics-PGY2  (p): [phone]    Pediatric Critical Care Fellow Progress Note:    Lazaro PLUNKETT  Ashe[place] remains critically ill with acute symptomatic DVTs requiring anticoagulation.    I personally examined and evaluated the patient today. All physician orders and treatments were placed at my direction.  Formulated plan with the house staff team or resident(s) and agree with the findings and plan in this note.  I have evaluated all laboratory values and imaging studies from the past 24 hours.  Consults ongoing and ordered are Hematology  I personally managed the respiratory and hemodynamic support, metabolic abnormalities, nutritional status, antimicrobial therapy, and pain/sedation management.   Key decisions made today included transfer to the PICU for further anticoagulation workup and treatment.  Coordinating with hematology for heparin drip.  Procedures that will happen in the ICU today are: none  The above plans and care have been discussed with patient and all questions and concerns were addressed.  Imtiaz Boyle  PICU Fellow PGY-6  Pager [phone]     Pediatric Critical Care Progress Note:    Lazaro Lund remains critically ill with extensive symptomatic DVT in the setting of acute T cell lymphoblastic lymphoma, currently undergoing induction chemotherapy.   I personally examined and evaluated the patient today. All physician orders and treatments were placed at my direction.  Formulated plan with the house staff team or resident(s) and agree with the findings and plan in this note.  I have evaluated all laboratory values and imaging studies from the past 24 hours.  Consults ongoing and ordered are Hematology/Oncology  I personally managed the respiratory and hemodynamic support, metabolic abnormalities, nutritional status, antimicrobial therapy, and pain/sedation management.   Key decisions made today included: heparin initiation with goal anti-Xa 0.3-0.7, monitor for symptoms of worsening clot or embolization, continue dexamethasone, regular diet  Procedures that will happen in the ICU today  are: none  The above plans and care have been discussed with Lazaro and all questions and concerns were addressed.  I spent a total of 40 minutes providing critical care services at the bedside, and on the critical care unit, evaluating the patient, directing care and reviewing laboratory values and radiologic reports for Lazaro PLUNKETT [place].  Gaye Gregory MD  [phone]    ___________________________________________    Interval History   Admitted yesterday with extensive venous clotting (R > L) visualized on US. Swelling worsening into today plus chest pain prompted Chest CT without evidence of PE. Started on Lovenox (s/p 2 doses) but determined to need heparin therapy which prompted transfer to the PICU for initiation. Chest pain is improved. He thinks that swelling is similar to admission.  Walked to floor without issue.     Physical Exam   Vital Signs: Temp: 98.3  F (36.8  C) Temp src: Axillary BP: 114/87 Pulse: 83 Heart Rate: 65 Resp: 20 SpO2: 97 % O2 Device: None (Room air)    Weight: 171 lbs 1.23 oz  GENERAL: Active, alert, in no acute distress. Pleasant on exam.   SKIN: Purpuric & papular rash on R arm as previously described.   HEAD: Normocephalic. Face visibly swollen (R > L).   EYES:  Extraocular muscles intact. Normal conjunctivae. Nani-orbital edema present.   NOSE: Normal without discharge.  MOUTH/THROAT: Clear. No oral lesions.   NECK: Supple, no masses.   LYMPH NODES: No adenopathy  LUNGS: Clear. No rales, rhonchi, wheezing or retractions  HEART: Regular rhythm. Normal S1/S2. No murmurs. Normal pulses.  ABDOMEN: Soft, non-tender, not distended. Bowel sounds normal.   NEUROLOGIC: No focal findings. Cranial nerves grossly intact: Normal gait, strength, sensation & tone  EXTREMITIES: Full range of motion, no deformities. Visibly swollen R upper extremity without pain or tingling in digits.     Data   Results for orders placed or performed during the hospital encounter of 09/26/19 (from the past 24  hour(s))   CT Chest Pulmonary Embolism w Contrast    Narrative    EXAMINATION: CTA pulmonary angiogram, 9/27/2019 9:40 AM     COMPARISON: Ultrasound 9/26/2019, radiograph 9/6/2019    HISTORY: PE suspected, high pretest probability    TECHNIQUE: Volumetric helical acquisition of CT images of the chest  from the lung apices to the kidneys were acquired after the  administration of 80 mL of Isovue-370 IV contrast. Three-dimensional  (3D) post-processed angiographic images were reconstructed, archived  to PACS and used in interpretation of this study.     FINDINGS:  Contrast bolus is: adequate.  Exam is negative for acute  pulmonary embolism.       Lungs: Moderate right pleural effusion. No pneumothorax. No findings  suspicious for infection. No suspicious pulmonary nodule.  Airways: Central tracheobronchial tree is clear.  Vessels: Main pulmonary artery and aorta are normal in caliber. Normal  three-vessel arch  Heart: Heart size is normal without pericardial effusion  Lymph nodes: No suspicious mediastinal or hilar lymphadenopathy.  Thyroid: Within normal limits.  Esophagus: Within normal limits    Vessels: Significant clot burden in the right brachiocephalic with  occlusion of the right subclavian vein. Additional clot in the left  brachiocephalic vein partially obscured by streak artifact.  Significant filling of collateral veins in the neck and spine.    Upper abdomen: Within normal limits.    Bones and soft tissues: No suspicious axillary lymphadenopathy or soft  tissue mass. No suspicious osseous lesion.      Impression    IMPRESSION:   1. Exam is negative for acute pulmonary embolism.  2. 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 Question  stenosis of the left subclavian and left innominate veins. Acute  thrombus in the left subclavian/axillary vein. Acute/subacute thrombus  in the left internal jugular and subclavian veins.  3. Small to moderate right pleural effusion.  4. There is tissue in the  anterior mediastinum which has a morphology  of thymus.    I have personally reviewed the examination and initial interpretation  and I agree with the findings.    BHAVYA CASTRO MD

## 2019-09-27 NOTE — PLAN OF CARE
AVSS. LS clear on RA. No sxs of pain. No c/o n/v. Body rash remains unchanged on trunk. Generalized facial swelling noted this morning. STAT CT ordered. CT done. Plan to transfer pt to PICU for heparin gtt per Blue team. Pt at bedside and updated on POC for afternoon. Hourly rounding completed. Will continue to monitor and reassess. Pt transferred down to PICU at 1200. Report given to Luna CLAY RN.

## 2019-09-27 NOTE — INTERIM SUMMARY
Name:Lazaro Lund  MRN: [id number]  : 2000  Room: [facility]/5142-    One Liner:      Lazaro Lund is a 19 year old male with newly diagnosed T-cell lymphoblastic lymphoma, treated per COG protocol [id number] currently Day 24 of induction therapy. He was admitted on  with 6 days of right arm swelling following a PICC removal on  found to have extensive bilateral upper extremity thrombi. Given concern for progression he was transferred to the PICU for heparin initiation.       Consults: Heme/onc Interval Events:        To Do:  ?   ?   ?       Situational:      FEN:  Last 24: Intake  Output  Post MN: Intake  Output  Lines/Tubes:   Wt:      Yest Wt:      Calc Wt: Total in:  IVF:  TPN/IL:  PO:  NG/GT:  pRBC:  PLT:    TFI ml/kg/day:   __________  __________  __________  __________  __________  __________  __________    __________ Total out:  Urine:  NG/emesis:  Stool:  Drain:  Blood:  Mix:    UOP ml/kg/hr:  NET: __________  __________  __________  __________  __________  __________  __________    __________  __________  Total in:  IVF:  TPN/IL:  PO:  NG/GT:  pRBC:  PLT:   __________  __________  __________  __________  __________  __________  __________   Total out:  Urine:  NG/emesis:  Stool:  Drain:  Blood:  Mix:    UOP ml/kg/hr:  NET: __________  __________  __________  __________  __________  __________  __________    __________  __________         VITALS/LABS/RESULTS MEDICATIONS/TREATMENTS ASSESSMENT/PLAN   FEN/  RENAL continued                                                  Ca:   _______________/               Mg:                                 \            Phos:                                                        iCa:  Alb:       T protein:                    - Regular diet    RESP: RR:__________   SaO2:__________ on _______%O2    VENT:  RR:                  TV:             PEEP:              PIP:  PS:     CV: HR:                           SBP:  CVP:                         DBP:                                          SVO2:                       MAP:  Lactate:     HEME/  ONC:           \____/                      INR:______          /        \                      PTT:______                                          Xa:_______                                          Fibr:______ S/p lovenox  - Heparin  - Repeat US week of 9/30  - Decadron 6mg BID, finishing 9/28      ID:    Tmax:      ____ Culture Date Results                        - Continue Bactrim M,T Treatment Start Stop To Cover   Bactrim                            CRP:  Procal:         GI: T Bili:             D Bili:  ALT:             AST:            AP: - Protonix 40 mg daily  - Benadryl 25 mg q6 prn  - Zofran 8 mg q6 prn  - PTA Senna BID PRN  - PTA Miralax 17 g PRN    ENDO:          Neuro:          - Tylenol q6 prn      ***

## 2019-09-27 NOTE — PROGRESS NOTES
[facility], [place]    Progress Note - Blue Service        Date of Admission:  9/26/2019    Assessment & Plan   Lazaro Lund is a 19 year old male with newly diagnosed T-cell lymphoblastic lymphoma, treated per COG protocol [id number] currently Day 25 of induction therapy. He had a PICC in right arm which was removed 9/24. He presents today from clinic with right arm swelling and upper extremities ultrasound showing extensive bilateral upper extremity thrombi. CTPE did not show PE, though with new facial swelling, there is concern of SVC syndrome. He requires transfer to the PICU for initiation of a heparin drip. He will remain admitted for titrating anticoagulation to therapeutic levels.     Heme-Onc  T-cell Lymphoblastic lymphoma  - Decadron 6mg BID, finishing 9/28 per chemotherapy protocol.   - Benadryl 25 mg q6 prn  - Zofran 8 mg q6 prn     Multiple bilateral thrombi of upper extremities--concerned by increased swelling of face and arm over last 12 hours  Hypercoagulability likely combination of known tumor, PEG asparaginase, and PICC line.  9/27 CT- no PE, extensive clot burden of upper extremities bilaterally, right occlusive thrombus of internal jugular vein worse than left.    - s/p Lovenox 80 mg x2.   - Obtain Xa level today- pending  - plan to start heparin drip, transfer to PICU  -maintain platelets >30 while on heparin/lovenox, monitor creatinine     CV/Resp  Intermittent chest pain, stable respiratory status on room air.  Currently no PE visualized on CTPE  - Vitals Q4H  - Close monitoring for respiratory signs/symptoms     ID  PCP prophylaxis  - Continue Bactrim M,T     GI  GI prophylaxis  - Protonix 40 mg daily     Constipation  - PTA Senna BID PRN  - PTA Miralax 17 g PRN     SKIN  Truncal and extremity folliculitis  Purpuric right arm lesion at former PICC site  - Continue to monitor     NEURO   Pain/discomfort   - Tylenol q6 prn  - Held home oxycodone 5 mg q6 prn  on admission; MD to assess if in significant pain     FEN  - Regular diet  - Monitor I/Os     Access: None  DVT Prophylaxis: Not applicable - starting therapeutic anticoagulation  Lara Catheter: Not present  Code Status: Full code      This patient was discussed with hematology/oncology attending Dr. Marleny Brewer and fellow Dr. Nicho Fischer.     Francine Coleman MD  N Pediatrics  PGY-1       I saw and evaluated the patient and agree with the resident's assessment and plan. I have personally reviewed all vital signs and laboratory studies performed in the last 24 hours.  Marleny Brewer MD, MPH    [facility]  Division of Pediatric Hematology/Oncology     ______________________________________________________________________    Interval History   Lazaro had no acute events overnight. He was able to sleep ok. He woke up this morning with increased swelling on right side of his face. It wasn't warm or painful. He had been lying flat on his back overnight.     A comprehensive review of systems was performed and was negative unless noted in the HPI.    Data reviewed today: I reviewed all medications, new labs and imaging results over the last 24 hours.    Physical Exam   Vital Signs: Temp: 98.3  F (36.8  C) Temp src: Axillary BP: 114/87 Pulse: 83 Heart Rate: 65 Resp: 20 SpO2: 97 % O2 Device: None (Room air)    Weight: 171 lbs 1.23 oz  GENERAL: Active, alert, well-appearing. Interacting and answering questions appropriately. Eating without difficulty.  SKIN: scattered prominent follicles and papules distributed over the proximal upper extremities, chest and back. Upper body vasculature is visible but no prominent collateral vasculature noted. Normal skin turgor.  HEAD: Cushingoid facies. Right side is swollen but not plethoric or warm or painful.  EYES: Pupils equal, round, reactive, extraocular muscles intact. Normal conjunctivae and sclerae. No significant  periorbital edema.  MOUTH/THROAT: Clear. No oral lesions. Teeth without obvious abnormalities. Moist mucous membranes.  NECK: Supple, no masses. Normal ROM. No prominent vasculature observed, no neck swelling.  LYMPH NODES: No cervical adenopathy appreciated.  LUNGS: Breathing comfortably. Clear to auscultation bilaterally. Good aeration. No rales, rhonchi, wheezing or retractions.  HEART/CHEST: Regular rate and rhythm. Normal S1/S2. No murmurs. Normal radial and DP/PT pulses. Capillary refill 2-3 seconds. There is mild reproducible tenderness with palpation just over the right mid sternal border.  ABDOMEN: Soft, non-tender, not distended, no masses or hepatosplenomegaly. Bowel sounds normal.   NEUROLOGIC: No focal findings. Cranial nerves grossly intact. Normal gait, 5/5 upper and lower extremity strength and normal tone. Distal sensation grossly intact.  EXTREMITIES: Full range of motion, moving extremities equally. The right arm appears grossly more swollen than the left, but not more red or painful or warm. No redness or firm swelling of the legs.    Data   Recent Results (from the past 24 hour(s))   US Extremity Upper Venous RT   Result Value    Radiologist flags Deep venous thrombosis (Urgent)    Narrative    HISTORY: 19-year-old male with T-cell lymphoma and increasing upper  extremity swelling on the right.    COMPARISON: None    FINDINGS: The right and left internal jugular veins are occluded with  thrombus. There is nonocclusive thrombus in the innominate veins  bilaterally, and the right subclavian vein. There is occlusive  thrombus in the right subclavian, proximal left subclavian, right  axillary, and one of the paired right brachial veins. There is  occlusive thrombus in the right basilic vein.      Impression    IMPRESSION: Extensive bilateral upper extremity deep venous thrombosis  as above.    [Urgent Result: Deep venous thrombosis]    Finding was identified on 9/26/2019 3:13 PM.     Dr. Jacobs  Justen was contacted by Dr. Cartwright at 9/26/2019 3:22 PM and  verbalized understanding of the urgent finding.     RYAN CARTWRIGHT MD   CT Chest Pulmonary Embolism w Contrast    Narrative    EXAMINATION: CTA pulmonary angiogram, 9/27/2019 9:40 AM     COMPARISON: Ultrasound 9/26/2019, radiograph 9/6/2019    HISTORY: PE suspected, high pretest probability    TECHNIQUE: Volumetric helical acquisition of CT images of the chest  from the lung apices to the kidneys were acquired after the  administration of 80 mL of Isovue-370 IV contrast. Three-dimensional  (3D) post-processed angiographic images were reconstructed, archived  to PACS and used in interpretation of this study.     FINDINGS:  Contrast bolus is: adequate.  Exam is negative for acute  pulmonary embolism.       Lungs: Moderate right pleural effusion. No pneumothorax. No findings  suspicious for infection. No suspicious pulmonary nodule.  Airways: Central tracheobronchial tree is clear.  Vessels: Main pulmonary artery and aorta are normal in caliber. Normal  three-vessel arch  Heart: Heart size is normal without pericardial effusion  Lymph nodes: No suspicious mediastinal or hilar lymphadenopathy.  Thyroid: Within normal limits.  Esophagus: Within normal limits    Vessels: Significant clot burden in the right brachiocephalic with  occlusion of the right subclavian vein. Additional clot in the left  brachiocephalic vein partially obscured by streak artifact.  Significant filling of collateral veins in the neck and spine.    Upper abdomen: Within normal limits.    Bones and soft tissues: No suspicious axillary lymphadenopathy or soft  tissue mass. No suspicious osseous lesion.      Impression    IMPRESSION:   1. Exam is negative for acute pulmonary embolism.  2. 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 Question  stenosis of the left subclavian and left innominate veins. Acute  thrombus in the left subclavian/axillary vein. Acute/subacute thrombus  in the  left internal jugular and subclavian veins.  3. Small to moderate right pleural effusion.  4. There is tissue in the anterior mediastinum which has a morphology  of thymus.    I have personally reviewed the examination and initial interpretation  and I agree with the findings.    BHAVYA CASTRO MD

## 2019-09-27 NOTE — PLAN OF CARE
Afebrile and vital signs stable. Rash remains unchanged over trunk. LS clear. No signs of pain or nausea. Will get Lovenox this am; followed up with MD on blue team about ordering a hep 10a level. Will discharge once he is at a therapeutic level. Will continue to monitor and notify team with changes in the plan of care.

## 2019-09-28 LAB
LMWH PPP CHRO-ACNC: 0.89 IU/ML
LMWH PPP CHRO-ACNC: 1.25 IU/ML

## 2019-09-28 PROCEDURE — 36415 COLL VENOUS BLD VENIPUNCTURE: CPT | Performed by: PEDIATRICS

## 2019-09-28 PROCEDURE — 12000014 ZZH R&B PEDS UMMC

## 2019-09-28 PROCEDURE — 25000125 ZZHC RX 250

## 2019-09-28 PROCEDURE — 85520 HEPARIN ASSAY: CPT | Performed by: PEDIATRICS

## 2019-09-28 PROCEDURE — 25000128 H RX IP 250 OP 636: Performed by: STUDENT IN AN ORGANIZED HEALTH CARE EDUCATION/TRAINING PROGRAM

## 2019-09-28 PROCEDURE — 25000132 ZZH RX MED GY IP 250 OP 250 PS 637

## 2019-09-28 PROCEDURE — 25000132 ZZH RX MED GY IP 250 OP 250 PS 637: Performed by: STUDENT IN AN ORGANIZED HEALTH CARE EDUCATION/TRAINING PROGRAM

## 2019-09-28 PROCEDURE — 25000131 ZZH RX MED GY IP 250 OP 636 PS 637: Performed by: STUDENT IN AN ORGANIZED HEALTH CARE EDUCATION/TRAINING PROGRAM

## 2019-09-28 RX ORDER — LIDOCAINE 40 MG/G
CREAM TOPICAL
Status: COMPLETED
Start: 2019-09-28 | End: 2019-09-28

## 2019-09-28 RX ADMIN — DEXAMETHASONE 6 MG: 2 TABLET ORAL at 09:38

## 2019-09-28 RX ADMIN — LIDOCAINE: 40 CREAM TOPICAL at 17:30

## 2019-09-28 RX ADMIN — Medication 3 MG: at 20:00

## 2019-09-28 RX ADMIN — ENOXAPARIN SODIUM 60 MG: 60 INJECTION SUBCUTANEOUS at 18:18

## 2019-09-28 RX ADMIN — DIPHENHYDRAMINE HYDROCHLORIDE 50 MG: 25 CAPSULE ORAL at 20:01

## 2019-09-28 RX ADMIN — DEXAMETHASONE 6 MG: 2 TABLET ORAL at 19:58

## 2019-09-28 RX ADMIN — PANTOPRAZOLE SODIUM 40 MG: 40 TABLET, DELAYED RELEASE ORAL at 09:38

## 2019-09-28 NOTE — PLAN OF CARE
Vitals per flowsheet. Afebrile. Tylenol x1 for headache. PRN benadryl for sleep per Lazaro's request. Heparin infusion titrated per order due to untherapeutic Xa

## 2019-09-28 NOTE — PROGRESS NOTES
[facility], [place]    Progress Note - PICU Service        Date of Admission:  9/26/2019    Assessment & Plan   Lazaro Lund is a 19 year old male with newly diagnosed T-cell lymphoblastic lymphoma, treated per COG protocol [id number] currently Day 28 of induction therapy. He was admitted on 9/26 with 6 days of right arm swelling following a PICC removal on 9/24 found to have extensive bilateral upper extremity thrombi. Given concern for progression he was transferred to the PICU yesterday for heparin initiation with plan to resume lovenox and return to the floor today.     Heme-Onc  T-cell Lymphoblastic lymphoma  - Decadron 6mg BID, finishing 9/28  - Benadryl 25 mg q6 prn  - Zofran 8 mg q6 prn     Multiple bilateral thrombi of upper extremities  - Discontinue heparin and re-start lovenox per heme  - Repeat US early next week     CV/Resp  Pinching chest pain, suspect precordial catch  At risk for PE:  CTA on 9/27 w/o PE.  - Vitals Q4H  - Close monitoring for respiratory signs/symptoms     ID  PCP prophylaxis  - Continue Bactrim M,T     GI  GI prophylaxis  - Protonix 40 mg daily     Constipation  - PTA Senna BID PRN  - PTA Miralax 17 g PRN     SKIN  Truncal and extremity folliculitis  Purpuric right arm lesion at former PICC site  - Continue to monitor     NEURO   Pain/discomfort   - Tylenol q6 prn  - Held home oxycodone 5 mg q6 prn on admission; MD to assess if in significant pain     FEN  - Regular diet  - Monitor I/Os     Diet: Peds Diet Age 9-18 yrs    Fluids: None  Lines: PIV  DVT Prophylaxis: On heparin, transitioning back to lovenox  Lara Catheter: not present  Code Status: Full    The patient's care was discussed with the Attending Physician, Dr. Gregory.    Kamala Buchanan MD  [facility] Pediatric Resident, PL3    Pediatric Critical Care Progress Note:    Lazaro Lund remains in the critical care unit recovering from acute symptomatic DVTs requiring anticoagulation  initiation, with improvement in swelling of right arm and right face today, in the setting of acute lymphoblastic lymphoma undergoing induction therapy.   I personally examined and evaluated the patient today. All physician orders and treatments were placed at my direction.   I personally managed the antibiotic therapy, pain management, metabolic abnormalities, and nutritional status. I discussed the patient with the resident and I agree with the plan as outlined above.  Key decisions made today included: holding heparin due to supratherapeutic anti-Xa level, plan to transition back to lovenox this afternoon, continue dexamethasone, regular diet, stable for transfer back to the oncology service  I spent a total of 35 minutes providing medical care services at the bedside, on the critical care unit, reviewing laboratory values and radiologic reports for Lazaro Lund.    This patient is no longer critically ill, but requires cardiac/respiratory monitoring, vital sign monitoring, temperature maintenance, enteral feeding adjustments, lab and/or oxygen monitoring by the health care team under direct physician supervision.   The above plans and care have been discussed with Lazaro.  Gaye Gregory MD  [phone]      ______________________________________________________________________    Interval History   Yesterday Lazaro was started on heparin drip and remained supra therapeutic. At 0630 this morning the heparin drip was discontinue and Xa two hours later was 0.89. Hematology would like to discontinue the heparin today and transition back to lovenox. Lazaro will transfer back to the floor today.    Data reviewed today: I reviewed all medications, new labs and imaging results over the last 24 hours. I personally reviewed no images or EKG's today.    Physical Exam   Vital Signs: Temp: 98  F (36.7  C) Temp src: (P) Oral BP: 121/76 Pulse: 61 Heart Rate: 59 Resp: 12 SpO2: 100 % O2 Device: None (Room air)     Weight: 171 lbs 1.23 oz  GENERAL: Active, alert, in no acute distress. Sitting up and interactive.  SKIN: Purpuric & papular rash on R arm as previously described.   HEAD: Normocephalic. Face visibly swollen (R > L) but improved.  EYES:  Extraocular muscles intact. Normal conjunctivae. Nani-orbital edema present.   NOSE: Normal without discharge.  MOUTH/THROAT: Clear. No oral lesions.   NECK: Supple, no masses.   LYMPH NODES: No adenopathy  LUNGS: Clear. No rales, rhonchi, wheezing or retractions  HEART: Regular rhythm. Normal S1/S2. No murmurs. Normal pulses.  ABDOMEN: Soft, non-tender, not distended. Bowel sounds normal.   NEUROLOGIC: No focal findings. Cranial nerves grossly intact: Normal strength and tone.  EXTREMITIES: Full range of motion, no deformities. Visibly swollen R upper extremity without pain or tingling in digits, improved significantly from yesterday.    Data   Recent Labs   Lab 09/27/19  1405 09/26/19  1339 09/24/19  1241   WBC 7.3 7.6 9.7   HGB 12.3* 13.1* 12.4*   MCV 82 82 84   * 144* 136*   NA  --   --  138   POTASSIUM  --   --  4.4   CHLORIDE  --   --  107   CO2  --   --  23   BUN  --   --  33*   CR  --   --  0.56*   ANIONGAP  --   --  8   MICHAEL  --   --  7.0*   GLC  --   --  102*     No results found for this or any previous visit (from the past 24 hour(s)).

## 2019-09-28 NOTE — PROGRESS NOTES
Family education completed:YES    Report given to: Jones    Time of transfer: 1300    Transferred to: Unit 5    Belongings sent: YES    Family updated:YES    Reviewed pertinent information from EPIC (EMAR/Clinical Summary/Flowsheets):YES    Head-to-toe assessment with receiving RN:YES    Recommendations   Pt afebrile and VSS. Denied pain. Stable on room air. Good UOP and BMx1. Mother at bedside and updated on POC.

## 2019-09-28 NOTE — PROGRESS NOTES
[facility], [place]    Pediatric Hematology / Oncology Progress Note    Date of Service (when I saw the patient): 09/28/2019     Assessment & Plan   Lazaro Lund is a 19 year old male with TLLy at end of induction who presented with significant clot burden and symptoms of SVC syndrome. Symptoms have improved on heparin gtt but levels have been supratherapeutic.     Plan  -okay to transfer back to unit 5/Blue team  -plan to start lovenox at 60mg tonight and check Xa level tomorrow at noon  -maintain platelets >30 while on lovenox, follow creatinine  -Complete Induction dexamethasone today, continue GI ppx    Signed,  Nicho Fischer   Pediatric Hematology/Oncology Fellow    I saw and evaluated the patient and agree with the fellow's assessment and plan. I have personally reviewed all vital signs and laboratory studies performed in the last 24 hours.  Marleny Brewer MD, MPH    [facility]  Division of Pediatric Hematology/Oncology         Interval History   No acute overnight events. Hep gtt had to be paused due to supratherapeutic levels. Swelling in right arm has improved a lot. He still feels fullness in his face but is more symmetric. Eating/drinking adequately. He knows to stay hydrated    A comprehensive review of systems was performed and is negative unless noted in the Interval History.    Physical Exam   Temp: 98  F (36.7  C) Temp src: (P) Oral BP: 121/76 Pulse: 61 Heart Rate: 59 Resp: 12 SpO2: 100 % O2 Device: None (Room air)    Vitals:    09/26/19 1825   Weight: 77.6 kg (171 lb 1.2 oz)     Vital Signs with Ranges  Temp:  [97.9  F (36.6  C)-98.5  F (36.9  C)] 98  F (36.7  C)  Pulse:  [61-89] 61  Heart Rate:  [] 59  Resp:  [10-20] 12  BP: (103-146)/(60-86) 121/76  SpO2:  [97 %-100 %] 100 %  I/O last 3 completed shifts:  In: 1464.54 [P.O.:1260; I.V.:204.54]  Out: 2240 [Urine:2240]    Const: alert, no  distress  HEENT: facial fullness without erythema/warmth/distended veins, remains slightly asymmetric but improved, moist mucus membranes  Neck: no LN  Chest: lungs CTA, no wheeze or increased WOB  CV: RRR, no murmur  Abdomen: soft nontender, no masses, +BS  MSK: improved swelling of right upper extremity, moving all extremities without difficulty  Skin: stable ecchymoses of right arm    Medications     HEParin Stopped (09/28/19 0633)       dexamethasone  6 mg Oral Q12H MARLEN     enoxaparin  60 mg Subcutaneous Q12H     melatonin  3 mg Oral At Bedtime     pantoprazole  40 mg Oral Daily     [START ON 9/30/2019] sulfamethoxazole-trimethoprim  1 tablet Oral Q Mon Tues BID       Data   Results for orders placed or performed during the hospital encounter of 09/26/19 (from the past 24 hour(s))   Heparin 10a Level   Result Value Ref Range    Heparin 10A Level 1.08 (HH) IU/mL   Methicillin Resistant Staph Aureus PCR   Result Value Ref Range    Specimen Description Nares     Methicillin Resist/Sens S. aureus PCR Negative NEG^Negative   CBC with platelets   Result Value Ref Range    WBC 7.3 4.0 - 11.0 10e9/L    RBC Count 4.77 4.4 - 5.9 10e12/L    Hemoglobin 12.3 (L) 13.3 - 17.7 g/dL    Hematocrit 39.1 (L) 40.0 - 53.0 %    MCV 82 78 - 100 fl    MCH 25.8 (L) 26.5 - 33.0 pg    MCHC 31.5 31.5 - 36.5 g/dL    RDW 13.8 10.0 - 15.0 %    Platelet Count 136 (L) 150 - 450 10e9/L   Heparin Xa (10a) Level   Result Value Ref Range    Heparin 10A Level 1.26 (HH) IU/mL   Heparin Xa (10a) Level   Result Value Ref Range    Heparin 10A Level 1.25 (HH) IU/mL   Heparin Xa (10a) Level   Result Value Ref Range    Heparin 10A Level 0.89 IU/mL

## 2019-09-28 NOTE — PROGRESS NOTES
"Transfer acceptance note    S: 18 yo male with T-cell lymphoblastic lymphoma with mediastinal mass, being treated per [id number]  pn day 28 of induction therapy. Admitted 9/26 with right arm swelling and new findings extensive bilateral UE thrombi. Started on lovenox 80 bid. Worsening facial and R arm swelling on 9/27 led to CTA for PE, which was negative, and transfer to PICU for heparin drip. Unfortunately, he was supratherapeutic on Xa level on arrival to PICU before heparin was initiated and remained supratherapeutic overnight (1.08-1.25) despite dosing adjustments and holding heparin. Swelling improved. Decision made to transfer back to the floor for lovenox at a lower dose. Feels well, no new complaints.    O: /77   Pulse 77   Temp 98.2  F (36.8  C) (Oral)   Resp 16   Ht 1.836 m (6' 0.28\")   Wt 77.6 kg (171 lb 1.2 oz)   SpO2 98%   BMI 23.02 kg/m      I/O last 3 completed shifts:  In: 1701.54 [P.O.:1500; I.V.:201.54]  Out: 1240 [Urine:1240]     Const: alert, no distress  HEENT: facial fullness without erythema/warmth/distended veins, remains slightly asymmetric but improved, moist mucus membranes  Neck: no LN  Chest: lungs CTA, no wheeze or increased WOB  CV: RRR, no murmur  Abdomen: soft nontender, no masses, +BS  MSK: improved swelling of right upper extremity, moving all extremities without difficulty  Skin: stable ecchymoses of right arm    A/P: 18 yo with T-cell lymphoblastic leukemia found to have extensive bilateral thrombi likely 2/2 cancer, recent chemotherapy, and recent (now removed) RUE PICC. Transferring back to the floor today due to being supratherapeutic on heparin drip.Continue current orders with the exception of:  - Discontinue heparin gtt  - Transition to lovenox 60 mg BID per pharmacy (~20% dose decrease)  - Xa level 4 hours after 2nd dose (1000 on 9/29)    Patient discussed with Dr. Brewer and fellow Dr. Fischer.    Luna Puente MD  Pediatric PGY-3      "

## 2019-09-28 NOTE — PLAN OF CARE
Pt.transferred from PICU to U at 1300. Up ad chase,took shower,and is tolerating a regular diet. Mom present and supportive. Will receive Lovenox this evening. Will continue transfer process and notify Blue team if changes in status noted.

## 2019-09-29 VITALS
BODY MASS INDEX: 23.17 KG/M2 | WEIGHT: 171.08 LBS | HEIGHT: 72 IN | HEART RATE: 77 BPM | DIASTOLIC BLOOD PRESSURE: 79 MMHG | RESPIRATION RATE: 16 BRPM | OXYGEN SATURATION: 98 % | TEMPERATURE: 98.3 F | SYSTOLIC BLOOD PRESSURE: 133 MMHG

## 2019-09-29 DIAGNOSIS — I82.623 ACUTE DEEP VEIN THROMBOSIS (DVT) OF OTHER VEIN OF BOTH UPPER EXTREMITIES (H): Primary | ICD-10-CM

## 2019-09-29 LAB
CREAT SERPL-MCNC: 0.5 MG/DL (ref 0.66–1.25)
ERYTHROCYTE [DISTWIDTH] IN BLOOD BY AUTOMATED COUNT: 13.9 % (ref 10–15)
GFR SERPL CREATININE-BSD FRML MDRD: >90 ML/MIN/{1.73_M2}
HCT VFR BLD AUTO: 41.5 % (ref 40–53)
HGB BLD-MCNC: 13.4 G/DL (ref 13.3–17.7)
LMWH PPP CHRO-ACNC: 0.74 IU/ML
MCH RBC QN AUTO: 26.3 PG (ref 26.5–33)
MCHC RBC AUTO-ENTMCNC: 32.3 G/DL (ref 31.5–36.5)
MCV RBC AUTO: 82 FL (ref 78–100)
PLATELET # BLD AUTO: 173 10E9/L (ref 150–450)
RBC # BLD AUTO: 5.09 10E12/L (ref 4.4–5.9)
WBC # BLD AUTO: 7.4 10E9/L (ref 4–11)

## 2019-09-29 PROCEDURE — 85027 COMPLETE CBC AUTOMATED: CPT | Performed by: STUDENT IN AN ORGANIZED HEALTH CARE EDUCATION/TRAINING PROGRAM

## 2019-09-29 PROCEDURE — 25000132 ZZH RX MED GY IP 250 OP 250 PS 637: Performed by: STUDENT IN AN ORGANIZED HEALTH CARE EDUCATION/TRAINING PROGRAM

## 2019-09-29 PROCEDURE — 25000128 H RX IP 250 OP 636: Performed by: STUDENT IN AN ORGANIZED HEALTH CARE EDUCATION/TRAINING PROGRAM

## 2019-09-29 PROCEDURE — 82565 ASSAY OF CREATININE: CPT | Performed by: STUDENT IN AN ORGANIZED HEALTH CARE EDUCATION/TRAINING PROGRAM

## 2019-09-29 PROCEDURE — 85520 HEPARIN ASSAY: CPT | Performed by: STUDENT IN AN ORGANIZED HEALTH CARE EDUCATION/TRAINING PROGRAM

## 2019-09-29 PROCEDURE — 36415 COLL VENOUS BLD VENIPUNCTURE: CPT | Performed by: STUDENT IN AN ORGANIZED HEALTH CARE EDUCATION/TRAINING PROGRAM

## 2019-09-29 RX ORDER — LIDOCAINE 40 MG/G
CREAM TOPICAL
Status: DISCONTINUED
Start: 2019-09-29 | End: 2019-09-29 | Stop reason: HOSPADM

## 2019-09-29 RX ADMIN — ENOXAPARIN SODIUM 60 MG: 60 INJECTION SUBCUTANEOUS at 05:37

## 2019-09-29 RX ADMIN — PANTOPRAZOLE SODIUM 40 MG: 40 TABLET, DELAYED RELEASE ORAL at 08:06

## 2019-09-29 NOTE — DISCHARGE SUMMARY
[facility], [place]  Discharge Summary - Medicine & Pediatrics       Date of Admission:  9/26/2019  Date of Discharge:  9/29/2019  Discharging Provider: Dr. Marleny Brewer  Discharge Service: Blue Heme Onc    Discharge Diagnoses   DVT    Follow-ups Needed After Discharge   Follow-up Appointments     Follow Up and recommended labs and tests      Follow up at [facility] on Tuesday 10/1 for your scheduled   appointment.             Unresulted Labs Ordered in the Past 30 Days of this Admission     Date and Time Order Name Status Description    9/18/2019 1105 Platelets prepare order unit In process     9/16/2019 1253 Platelets prepare order unit In process     8/31/2019 1831 Cytology non gyn In process           Discharge Disposition   Discharged to home  Condition at discharge: Stable    Hospital Course   Lazaro Lund is a 19 year old male with newly diagnosed T-cell lymphoblastic lymphoma, treated per COG protocol [id number] currently on induction therapy. He was admitted on 9/26/2019 for bilateral upper extremity thrombi. Prior to admission, he had a right PICC line which was removed on 9/24. He had 1 week of right arm swelling. His multiple DVTs are likely due to a combination of his neoplastic process, the PICC line, and PEG Asparaginase treatment. The following problems were addressed during his hospitalization:    Heme-Onc  T-cell Lymphoblastic lymphoma  - Decadron 6mg BID, finished 9/28 per protocol  - Benadryl 25 mg q6 prn  - Zofran 8 mg q6 prn     Multiple bilateral thrombi of upper extremities. CTA did not show PE. He did have right facial swelling concerning for SVC syndrome. He was started on Lovenox before switching to heparin drip due to concern for high clot burden but was found to be supratherapeutic and was switched back to Lovenox. He was discharged home at therapeutic levels.   - Lovenox 60 mg/dose BID  - Require follow up at [facility] for LP on 10/1 and repeat  10a levels on 10/3.     CV/Resp  Hx pinching intermittent chest pain. CTA negative for PE.      ID  PCP prophylaxis  - Continue Bactrim M,T     GI  - Protonix 40 mg daily  - PTA Senna BID PRN  - PTA Miralax 17 g PRN     SKIN  Truncal and extremity folliculitis  Purpuric right arm lesion at former PICC site- improved  - Continue to monitor     NEURO   Pain/discomfort   - Tylenol q6 prn  - Held home oxycodone 5 mg q6 prn on admission    Consultations This Hospital Stay   SOCIAL WORK IP CONSULT  PEDS HEM/ONC IP CONSULT    Code Status   Full Code       This patient was seen and discussed with Dr. Marleny Brewer.    Francine Coleman MD  [facility]  PGY-1    __________________________________________________________    Physical Exam   Vital Signs: Temp: 98.3  F (36.8  C) Temp src: Oral BP: 133/79   Heart Rate: 76 Resp: 16 SpO2: 98 % O2 Device: None (Room air)    Weight: 171 lbs 1.23 oz     GENERAL: Active, alert, well-appearing. Interacting and answering questions appropriately.   SKIN: scattered prominent follicles and papules distributed over the proximal upper extremities, chest and back.   HEAD: Cushingoid facies. Face appears more symmetric today but right side still slightly more swollen.   EYES: extraocular muscles intact. Normal conjunctivae and sclerae. No significant periorbital edema.  MOUTH/THROAT: Clear. No oral lesions. Teeth without obvious abnormalities. Moist mucous membranes.  NECK: Supple, no masses. Normal ROM. No prominent vasculature observed, no neck swelling.  LUNGS: Breathing comfortably. Clear to auscultation bilaterally. Good aeration. No rales, rhonchi, wheezing or retractions.  HEART/CHEST: Regular rate and rhythm. Normal S1/S2. No murmurs. Capillary refill 2-3 seconds.  ABDOMEN: Soft, non-tender, not distended, no masses. Bowel sounds normal.   NEUROLOGIC: No focal findings. Cranial nerves grossly intact. Normal gait, normal tone.   EXTREMITIES: Full range of motion, moving extremities equally.  The right arm appears slightly more swollen than the left, but not more red or painful or warm. No redness or firm swelling of the legs.      Primary Care Physician   Rodriguez Montoya    Discharge Orders      Reason for your hospital stay    Bilateral acute upper extremity DVTs. CTA showing no PE.     Activity    Your activity upon discharge: activity as tolerated     When to contact your care team    Call us if you have any of the following:  increased shortness of breath, increased drainage or increased swelling.     Follow Up and recommended labs and tests    Follow up at [facility] on Tuesday 10/1 for your scheduled appointment.     Discharge Instructions    Please do not take the Lovenox on Monday (9/30) night and Tuesday (10/1) morning in anticipation for your lumbar puncture.   Please follow up in [facility] on Tuesday 10/1 at your scheduled time.  On Thursday 10/3, you will need to return to clinic for a lab check: 10a level.     Full Code     Diet    Follow this diet upon discharge: Regular       Significant Results and Procedures   Most Recent 3 CBC's:  Recent Labs   Lab Test 09/29/19  1019 09/27/19  1405 09/26/19  1339   WBC 7.4 7.3 7.6   HGB 13.4 12.3* 13.1*   MCV 82 82 82    136* 144*     Most Recent 3 BMP's:  Recent Labs   Lab Test 09/29/19  1019 09/24/19  1241 09/17/19  1209 09/16/19  1513   NA  --  138 136 135   POTASSIUM  --  4.4 4.0 4.4   CHLORIDE  --  107 103 103   CO2  --  23 25 28   BUN  --  33* 30 29   CR 0.50* 0.56* 0.65* 0.56*   ANIONGAP  --  8 8 4   MICHAEL  --  7.0* 7.3* 7.5*   GLC  --  102* 99 109*     Most Recent 3 INR's:  Recent Labs   Lab Test 09/16/19  1513 09/04/19  0420 08/30/19  1557   INR 1.26* 1.20* 1.17*     Most Recent INR's and Anticoagulation Dosing History:  Anticoagulation Dose History     Recent Dosing and Labs Latest Ref Rng & Units 8/30/2019 9/4/2019 9/16/2019    INR 0.86 - 1.14 1.17(H) 1.20(H) 1.26(H)      ,   Results for orders placed or performed during the  hospital encounter of 09/26/19   CT Chest Pulmonary Embolism w Contrast    Narrative    EXAMINATION: CTA pulmonary angiogram, 9/27/2019 9:40 AM     COMPARISON: Ultrasound 9/26/2019, radiograph 9/6/2019    HISTORY: PE suspected, high pretest probability    TECHNIQUE: Volumetric helical acquisition of CT images of the chest  from the lung apices to the kidneys were acquired after the  administration of 80 mL of Isovue-370 IV contrast. Three-dimensional  (3D) post-processed angiographic images were reconstructed, archived  to PACS and used in interpretation of this study.     FINDINGS:  Contrast bolus is: adequate.  Exam is negative for acute  pulmonary embolism.       Lungs: Moderate right pleural effusion. No pneumothorax. No findings  suspicious for infection. No suspicious pulmonary nodule.  Airways: Central tracheobronchial tree is clear.  Vessels: Main pulmonary artery and aorta are normal in caliber. Normal  three-vessel arch  Heart: Heart size is normal without pericardial effusion  Lymph nodes: No suspicious mediastinal or hilar lymphadenopathy.  Thyroid: Within normal limits.  Esophagus: Within normal limits    Vessels: Significant clot burden in the right brachiocephalic with  occlusion of the right subclavian vein. Additional clot in the left  brachiocephalic vein partially obscured by streak artifact.  Significant filling of collateral veins in the neck and spine.    Upper abdomen: Within normal limits.    Bones and soft tissues: No suspicious axillary lymphadenopathy or soft  tissue mass. No suspicious osseous lesion.      Impression    IMPRESSION:   1. Exam is negative for acute pulmonary embolism.  2. 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 Question  stenosis of the left subclavian and left innominate veins. Acute  thrombus in the left subclavian/axillary vein. Acute/subacute thrombus  in the left internal jugular and subclavian veins.  3. Small to moderate right pleural effusion.  4. There  is tissue in the anterior mediastinum which has a morphology  of thymus.    I have personally reviewed the examination and initial interpretation  and I agree with the findings.    BHAVYA CASTRO MD       Discharge Medications   Current Discharge Medication List      START taking these medications    Details   enoxaparin (LOVENOX) 60 MG/0.6ML syringe Inject 0.6 mLs (60 mg) Subcutaneous every 12 hours for 30 doses  Qty: 18 mL, Refills: 1    Associated Diagnoses: Acute deep vein thrombosis (DVT) of other vein of both upper extremities (H)         CONTINUE these medications which have NOT CHANGED    Details   diphenhydrAMINE (BENADRYL) 25 MG capsule Take 1-2 capsules (25-50 mg) by mouth every 6 hours as needed (Breakthrough Nausea and Vomiting )  Qty: 30 capsule, Refills: 1    Associated Diagnoses: Lymphoma, unspecified body region, unspecified lymphoma type (H)      melatonin 3 MG tablet Take 1 tablet (3 mg) by mouth At Bedtime  Qty: 30 tablet, Refills: 1    Associated Diagnoses: 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)      ondansetron (ZOFRAN) 8 MG tablet Take 1 tablet (8 mg) by mouth every 6 hours as needed for nausea  Qty: 30 tablet, Refills: 1    Associated Diagnoses: 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)      oxyCODONE (ROXICODONE) 5 MG tablet Take 1 tablet (5 mg) by mouth every 6 hours as needed for moderate to severe pain  Qty: 10 tablet, Refills: 0    Associated Diagnoses: 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)      pantoprazole (PROTONIX) 40 MG EC tablet Take 1 tablet (40 mg) by mouth daily for 20 days . This acid blocker helps prevent stomach pain while on your decadron chemotherapy.  Qty: 20 tablet, Refills: 0    Associated Diagnoses: T lymphoblastic lymphoma (H)      polyethylene glycol (MIRALAX/GLYCOLAX) packet Take 17 g by mouth daily  Qty: 30 packet, Refills: 0    Associated Diagnoses: 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)      sennosides (SENOKOT) 8.6 MG tablet  Take 2 tablets by mouth 2 times daily as needed for constipation  Qty: 60 each, Refills: 1    Associated Diagnoses: 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)      sulfamethoxazole-trimethoprim (BACTRIM DS/SEPTRA DS) 800-160 MG tablet Take 1 tablet by mouth Every Mon, Tues two times daily  Qty: 16 tablet, Refills: 0    Associated Diagnoses: 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)      NO ACTIVE MEDICATIONS .         STOP taking these medications       dexamethasone (DECADRON) 6 MG tablet Comments:   Reason for Stopping:         lidocaine-prilocaine (EMLA) 2.5-2.5 % external cream Comments:   Reason for Stopping:             Allergies   Allergies   Allergen Reactions     No Known Drug Allergies      I saw and evaluated this patient and agree with the resident's assessment and plan. Greater than 30 minutes were spent arranging the discharge and discussing the discharge plan and follow-up with the patient/family. Specifically instructed Lazaro to HOLD lovenox on Monday PM and Tuesday AM prior to scheduled LP. He voiced understanding.  Marleny Brewer MD, MPH    [facility]  Division of Pediatric Hematology/Oncology

## 2019-09-29 NOTE — PLAN OF CARE
Discharge orders written and summarized with pt. And AVS signed by him. PIV removed. Lovenox sent home with pt. As discharge medication. Pt. Discharged home with mom as ordered and will follow up with providers as directed.

## 2019-09-29 NOTE — PLAN OF CARE
Afebrile. VSS. No s/s of pain or N/V. Slept well through the night. PIV saline locked. Ex-PICC site leaking serous drainage, became saturated, new dressing applied around 0545. Good UOP, pt did not save though. Lovenox given around 0540, will need Hep 10A level drawn at around 0940. No family present at bedside. Hourly rounding completed. Will continue to monitor.

## 2019-09-29 NOTE — PROGRESS NOTES
Discharge orders written and summarized with patient. AVS signed by him. PIV removed. Lovenox sent with pt.as discharge medication. Pt.discharged home with mom and will follow up with providers as directed.

## 2019-09-29 NOTE — PLAN OF CARE
[id number]  Afebrile, VSS. No complaints of pain or nausea. Eating and drinking well. Pt old PICC site leaking clear fluid, MD aware. Dressing changed. No other issues will update as needed.

## 2019-10-01 ENCOUNTER — HOSPITAL ENCOUNTER (OUTPATIENT)
Facility: CLINIC | Age: 19
Discharge: HOME | End: 2019-10-01
Attending: NURSE PRACTITIONER | Admitting: NURSE PRACTITIONER
Payer: MEDICAID

## 2019-10-01 ENCOUNTER — HOSPITAL ENCOUNTER (OUTPATIENT)
Dept: CT IMAGING | Facility: CLINIC | Age: 19
End: 2019-10-01
Attending: NURSE PRACTITIONER | Admitting: NURSE PRACTITIONER
Payer: MEDICAID

## 2019-10-01 ENCOUNTER — HOSPITAL ENCOUNTER (OUTPATIENT)
Dept: CT IMAGING | Facility: CLINIC | Age: 19
End: 2019-10-01
Attending: NURSE PRACTITIONER
Payer: MEDICAID

## 2019-10-01 ENCOUNTER — HOSPITAL ENCOUNTER (OUTPATIENT)
Dept: ULTRASOUND IMAGING | Facility: CLINIC | Age: 19
End: 2019-10-01
Attending: NURSE PRACTITIONER | Admitting: NURSE PRACTITIONER
Payer: MEDICAID

## 2019-10-01 ENCOUNTER — ANESTHESIA (OUTPATIENT)
Dept: PEDIATRICS | Facility: CLINIC | Age: 19
End: 2019-10-01
Payer: MEDICAID

## 2019-10-01 ENCOUNTER — OFFICE VISIT (OUTPATIENT)
Dept: PEDIATRIC HEMATOLOGY/ONCOLOGY | Facility: CLINIC | Age: 19
End: 2019-10-01
Attending: NURSE PRACTITIONER
Payer: MEDICAID

## 2019-10-01 ENCOUNTER — ANESTHESIA EVENT (OUTPATIENT)
Dept: PEDIATRICS | Facility: CLINIC | Age: 19
End: 2019-10-01
Payer: MEDICAID

## 2019-10-01 VITALS
RESPIRATION RATE: 16 BRPM | DIASTOLIC BLOOD PRESSURE: 68 MMHG | TEMPERATURE: 98.5 F | BODY MASS INDEX: 22.32 KG/M2 | HEIGHT: 73 IN | SYSTOLIC BLOOD PRESSURE: 119 MMHG | OXYGEN SATURATION: 98 % | WEIGHT: 168.43 LBS

## 2019-10-01 DIAGNOSIS — C95.00 ACUTE LEUKEMIA NOT HAVING ACHIEVED REMISSION (H): ICD-10-CM

## 2019-10-01 DIAGNOSIS — C83.50 T LYMPHOBLASTIC LYMPHOMA (H): Primary | ICD-10-CM

## 2019-10-01 DIAGNOSIS — C83.50 T LYMPHOBLASTIC LYMPHOMA (H): ICD-10-CM

## 2019-10-01 LAB
ANION GAP SERPL CALCULATED.3IONS-SCNC: 4 MMOL/L (ref 3–14)
BASOPHILS # BLD AUTO: 0 10E9/L (ref 0–0.2)
BASOPHILS NFR BLD AUTO: 0.4 %
BUN SERPL-MCNC: 16 MG/DL (ref 7–30)
CALCIUM SERPL-MCNC: 7.5 MG/DL (ref 8.5–10.1)
CHLORIDE SERPL-SCNC: 102 MMOL/L (ref 94–109)
CO2 SERPL-SCNC: 31 MMOL/L (ref 20–32)
CREAT SERPL-MCNC: 0.44 MG/DL (ref 0.66–1.25)
DEPRECATED CALCIDIOL+CALCIFEROL SERPL-MC: 11 UG/L (ref 20–75)
DIFFERENTIAL METHOD BLD: ABNORMAL
EOSINOPHIL # BLD AUTO: 0 10E9/L (ref 0–0.7)
EOSINOPHIL NFR BLD AUTO: 0.1 %
ERYTHROCYTE [DISTWIDTH] IN BLOOD BY AUTOMATED COUNT: 14.2 % (ref 10–15)
GFR SERPL CREATININE-BSD FRML MDRD: >90 ML/MIN/{1.73_M2}
GLUCOSE SERPL-MCNC: 74 MG/DL (ref 70–99)
HCT VFR BLD AUTO: 38 % (ref 40–53)
HGB BLD-MCNC: 12.1 G/DL (ref 13.3–17.7)
IMM GRANULOCYTES # BLD: 0.3 10E9/L (ref 0–0.4)
IMM GRANULOCYTES NFR BLD: 4.5 %
LYMPHOCYTES # BLD AUTO: 1.6 10E9/L (ref 0.8–5.3)
LYMPHOCYTES NFR BLD AUTO: 21.6 %
MCH RBC QN AUTO: 26.2 PG (ref 26.5–33)
MCHC RBC AUTO-ENTMCNC: 31.8 G/DL (ref 31.5–36.5)
MCV RBC AUTO: 82 FL (ref 78–100)
MONOCYTES # BLD AUTO: 0.4 10E9/L (ref 0–1.3)
MONOCYTES NFR BLD AUTO: 5.2 %
NEUTROPHILS # BLD AUTO: 5 10E9/L (ref 1.6–8.3)
NEUTROPHILS NFR BLD AUTO: 68.2 %
NRBC # BLD AUTO: 0 10*3/UL
NRBC BLD AUTO-RTO: 0 /100
PLATELET # BLD AUTO: 161 10E9/L (ref 150–450)
POTASSIUM SERPL-SCNC: 3.8 MMOL/L (ref 3.4–5.3)
RBC # BLD AUTO: 4.61 10E12/L (ref 4.4–5.9)
SODIUM SERPL-SCNC: 137 MMOL/L (ref 133–144)
WBC # BLD AUTO: 7.4 10E9/L (ref 4–11)

## 2019-10-01 PROCEDURE — 93971 EXTREMITY STUDY: CPT | Mod: RT

## 2019-10-01 PROCEDURE — 37000008 ZZH ANESTHESIA TECHNICAL FEE, 1ST 30 MIN: Performed by: NURSE PRACTITIONER

## 2019-10-01 PROCEDURE — 74177 CT ABD & PELVIS W/CONTRAST: CPT

## 2019-10-01 PROCEDURE — 25000125 ZZHC RX 250: Performed by: ANESTHESIOLOGY

## 2019-10-01 PROCEDURE — 82306 VITAMIN D 25 HYDROXY: CPT | Performed by: PEDIATRICS

## 2019-10-01 PROCEDURE — 36592 COLLECT BLOOD FROM PICC: CPT | Performed by: NURSE PRACTITIONER

## 2019-10-01 PROCEDURE — 80048 BASIC METABOLIC PNL TOTAL CA: CPT | Performed by: STUDENT IN AN ORGANIZED HEALTH CARE EDUCATION/TRAINING PROGRAM

## 2019-10-01 PROCEDURE — 25000125 ZZHC RX 250: Performed by: NURSE PRACTITIONER

## 2019-10-01 PROCEDURE — 40001011 ZZH STATISTIC PRE-PROCEDURE NURSING ASSESSMENT: Performed by: NURSE PRACTITIONER

## 2019-10-01 PROCEDURE — 25800030 ZZH RX IP 258 OP 636: Performed by: NURSE ANESTHETIST, CERTIFIED REGISTERED

## 2019-10-01 PROCEDURE — 25500064 ZZH RX 255 OP 636: Performed by: NURSE PRACTITIONER

## 2019-10-01 PROCEDURE — 85025 COMPLETE CBC W/AUTO DIFF WBC: CPT | Performed by: STUDENT IN AN ORGANIZED HEALTH CARE EDUCATION/TRAINING PROGRAM

## 2019-10-01 PROCEDURE — 70491 CT SOFT TISSUE NECK W/DYE: CPT

## 2019-10-01 RX ORDER — IOPAMIDOL 612 MG/ML
100 INJECTION, SOLUTION INTRAVASCULAR ONCE
Status: COMPLETED | OUTPATIENT
Start: 2019-10-01 | End: 2019-10-01

## 2019-10-01 RX ORDER — HEPARIN SODIUM,PORCINE 10 UNIT/ML
5 VIAL (ML) INTRAVENOUS ONCE
Status: CANCELLED | OUTPATIENT
Start: 2019-10-01 | End: 2019-10-01

## 2019-10-01 RX ORDER — PANTOPRAZOLE SODIUM 40 MG/1
40 TABLET, DELAYED RELEASE ORAL DAILY
Qty: 30 TABLET | Refills: 0 | Status: ON HOLD | OUTPATIENT
Start: 2019-10-01 | End: 2019-10-17

## 2019-10-01 RX ORDER — LANOLIN ALCOHOL/MO/W.PET/CERES
3 CREAM (GRAM) TOPICAL AT BEDTIME
Qty: 30 TABLET | Refills: 1 | Status: ON HOLD | OUTPATIENT
Start: 2019-10-01 | End: 2020-01-23

## 2019-10-01 RX ORDER — LIDOCAINE 40 MG/G
2.5 CREAM TOPICAL
Status: COMPLETED | OUTPATIENT
Start: 2019-10-01 | End: 2019-10-01

## 2019-10-01 RX ORDER — SODIUM CHLORIDE, SODIUM LACTATE, POTASSIUM CHLORIDE, CALCIUM CHLORIDE 600; 310; 30; 20 MG/100ML; MG/100ML; MG/100ML; MG/100ML
INJECTION, SOLUTION INTRAVENOUS CONTINUOUS PRN
Status: DISCONTINUED | OUTPATIENT
Start: 2019-10-01 | End: 2019-10-01

## 2019-10-01 RX ORDER — CEPHALEXIN 500 MG/1
500 CAPSULE ORAL 2 TIMES DAILY
Qty: 14 CAPSULE | Refills: 0 | Status: ON HOLD | OUTPATIENT
Start: 2019-10-01 | End: 2019-10-17

## 2019-10-01 RX ORDER — LIDOCAINE 40 MG/G
CREAM TOPICAL
Status: DISCONTINUED
Start: 2019-10-01 | End: 2019-10-01 | Stop reason: HOSPADM

## 2019-10-01 RX ORDER — SULFAMETHOXAZOLE/TRIMETHOPRIM 800-160 MG
1 TABLET ORAL
Qty: 16 TABLET | Refills: 0 | Status: ON HOLD | OUTPATIENT
Start: 2019-10-01 | End: 2019-10-31

## 2019-10-01 RX ORDER — LIDOCAINE 40 MG/G
2.5 CREAM TOPICAL
Status: DISCONTINUED | OUTPATIENT
Start: 2019-10-01 | End: 2019-10-01 | Stop reason: HOSPADM

## 2019-10-01 RX ORDER — FLUCONAZOLE 200 MG/1
400 TABLET ORAL DAILY
Qty: 28 TABLET | Refills: 0 | Status: ON HOLD | OUTPATIENT
Start: 2019-10-01 | End: 2019-10-17

## 2019-10-01 RX ADMIN — SODIUM CHLORIDE 65 ML: 9 INJECTION, SOLUTION INTRAVENOUS at 10:24

## 2019-10-01 RX ADMIN — SODIUM CHLORIDE, POTASSIUM CHLORIDE, SODIUM LACTATE AND CALCIUM CHLORIDE: 600; 310; 30; 20 INJECTION, SOLUTION INTRAVENOUS at 13:40

## 2019-10-01 RX ADMIN — IOPAMIDOL 98 ML: 612 INJECTION, SOLUTION INTRAVENOUS at 10:23

## 2019-10-01 ASSESSMENT — MIFFLIN-ST. JEOR: SCORE: 1816.49

## 2019-10-01 NOTE — PROGRESS NOTES
Pediatric Hematology/Oncology Clinic Note    Lazaro Lund is a 19 year old young man with newly diagnosed T-cell lymphoblastic lymphoma. Lazaro presented with acute onset cough and was found to have an anterior mediastinal mass and malignant left-sided pleural effusion.  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  He was admitted to [facility] oncology service 8/30 and started on treatment per [id number].  He had a pleural effusion that required a chest tube and a pericardial effusion that was not drained. His Induction was complicated by cardiac compression secondary to his mass leading to tamponade, this improved through out his hospital stay.  He also had dysphagia that improved with treatment of his mass, swallow study was normal. His course was recently complicated by extensive bilateral UE DVTs for which anticoagulation was started during hospitalization last week. He presents to [facility] for Day 29 sedated LP with IT chemo following end of induction imaging. He is initially unaccompanied, but is joined by mom toward end of visit.    HPI:   Since discharge, Lazaro has done OK. He feels his right arm is maybe a little more swollen and is now tender. He notices that the skin was irritated, but he also has a little discomfort in his forearm where there wasn't a bandage. He is still having clear drainage from the prior PICC insertion site. No fevers or shaking chills. His lovenox has been going well, he held his doses last night and this morning in preparation for his procedures. No bleeding symptoms. The bruising at the PICC site has gotten a little better. His rash on his torso has gotten a little worse. It is non-pruritic and non-painful. No vesicular lesions. Leg soreness is better. No vision concerns. He is not noticing blurred vision. No further pinching sensation in his chest. Mild cough really unchanged. No SOB, dyspnea or respiratory  concerns.     Review of systems:  General: Stable energy level.   HEENT: Denies concerns with vision or hearing.   Respiratory: See HPI.  Cardiovascular: No chest pain or palpitations.   Endocrine: No hot/cold intolerance. No increase thirst or urination.   GI: No n/v/d/c or abdominal pain. Stools regular with 2 senna once daily.  : No difficulty with urination.   Skin: See HPI.  Neuro: No weakness or numbness.   MSK: No change in ROM or function. No tripping or falling.   Heme: No epistaxis, oozing gums or easy bruising.       PMH:   Past Medical History:   Diagnosis Date     DVT of upper extremity (deep vein thrombosis) (H) 09/26/2019    Bilateral      Migraine 2006    have resolved     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   Spinal HA following LP  TPMT shows Intermediate activity    PFMH:   Family History   Problem Relation Age of Onset     No Known Problems Mother      No Known Problems Father      Asthma Brother      Thyroid Cancer Paternal Grandmother      Melanoma Paternal Aunt        Social History: Lazaro previously lived at home with his dad and step mom in [place] but is now staying with his mom in [place].  He was working full time at [employer] in [place] prior to his diagnosis.    Current Medications:  Current Outpatient Medications   Medication Sig Dispense Refill     cephALEXin (KEFLEX) 500 MG capsule Take 1 capsule (500 mg) by mouth 2 times daily for 7 days 14 capsule 0     fluconazole (DIFLUCAN) 200 MG tablet Take 2 tablets (400 mg) by mouth daily for 14 days 28 tablet 0     melatonin 3 MG tablet Take 1 tablet (3 mg) by mouth At Bedtime 30 tablet 1     pantoprazole (PROTONIX) 40 MG EC tablet Take 1 tablet (40 mg) by mouth daily . This acid blocker helps prevent stomach pain while on your decadron chemotherapy. 30 tablet 0     sulfamethoxazole-trimethoprim (BACTRIM DS/SEPTRA DS) 800-160 MG tablet Take 1 tablet by mouth Every Mon, Tues two times daily 16 tablet 0     diphenhydrAMINE (BENADRYL)  25 MG capsule Take 1-2 capsules (25-50 mg) by mouth every 6 hours as needed (Breakthrough Nausea and Vomiting ) 30 capsule 1     enoxaparin (LOVENOX) 60 MG/0.6ML syringe Inject 0.6 mLs (60 mg) Subcutaneous every 12 hours for 30 doses 18 mL 1     ondansetron (ZOFRAN) 8 MG tablet Take 1 tablet (8 mg) by mouth every 6 hours as needed for nausea 30 tablet 1     oxyCODONE (ROXICODONE) 5 MG tablet Take 1 tablet (5 mg) by mouth every 6 hours as needed for moderate to severe pain 10 tablet 0     polyethylene glycol (MIRALAX/GLYCOLAX) packet Take 17 g by mouth daily 30 packet 0     sennosides (SENOKOT) 8.6 MG tablet Take 2 tablets by mouth 2 times daily as needed for constipation 60 each 1   Above medications reviewed. Keflex and fluconazole new Rx effective today.     Physical Exam:   Temp:  [98.5  F (36.9  C)] 98.5  F (36.9  C)  Heart Rate:  [86] 86  Resp:  [16] 16  BP: (119)/(68) 119/68  SpO2:  [98 %] 98 %  Wt Readings from Last 4 Encounters:   10/01/19 76.4 kg (168 lb 6.9 oz)   09/26/19 77.6 kg (171 lb 1.2 oz)   09/26/19 77.9 kg (171 lb 11.8 oz)   09/24/19 79 kg (174 lb 2.6 oz)   Weight at diagnosis was 75.2kg, Lazaro considers his baseline weight to be 173-175lbs before he initially got sick  General: Lazaro is alert, interactive and appropriate. NAD. He is cushingoid.   HEENT: Skull is atrauamatic and normocephalic. Full head of hair. PERRLA, sclera are non icteric and not injected, EOM are intact. Nares are patent without drainage. Oropharynx with ulcerations lined with white coating. Buccal mucosa and tongue spared. MMM. Tympanic membranes are opaque bilaterally with light reflex and landmarks present.  Lymph:  Neck is supple without lymphadenopathy.  There is no supraclavicular, axillary or inguinal lymphadenopathy palpated.  Cardiovascular:  HR is regular, S1, S2 no murmur.  Capillary refill is < 2 seconds. Right arm with edema dorsally at elbow and bicep. Some firmness and redness near prior PICC insertion site.  Slightly warm to touch. Serosanguinous drainage noted from site by nurse. Cap refill < 2 sec. Peripheral pulses 2+/=.   Measurements of arms as follows:   Right arm at AC: 33cm (up from 32cm)  Right bicep (10cm from AC): stable at 32.5cm  Respiratory: No cough noted. Respirations are easy.  Lungs are clear to auscultation through out.  No crackles or wheezes.  Gastrointestinal:  BS present in all quadrants.  Abdomen is soft and non-tender.  No hepatosplenomegaly or masses are palpated.  Skin: Unwrapped coban dressing of RUE, small amount of clear drainage noted on dressing. Purpura near old PICC site on right arm. Diffuse truncal and upper extremity papular to pustule non-blanching erythematous rash. Extends to just past nipple line anteriorly with 1-2 mm pustules noted. Rash on back concentrated with papules extending down to low back with some lesions in lumbar back region. No vesicular lesions. One 3-4mm crusted lesions over thoracic spine.   Neurological:  CN 2-12 grossly intact. Gait is normal.  No issues with balance. Sensation intact in hands and feet.     Musculoskeletal:  Good strength and ROM in all extremities.  Strong dorsiflexion at ankles and great toes (5/5) bilaterally without any pain at the Achilles.    Labs:   Results for orders placed or performed during the hospital encounter of 10/01/19 (from the past 24 hour(s))   Vitamin D deficiency screening   Result Value Ref Range    Vitamin D Deficiency screening 11 (L) 20 - 75 ug/L   CBC with platelets differential   Result Value Ref Range    WBC 7.4 4.0 - 11.0 10e9/L    RBC Count 4.61 4.4 - 5.9 10e12/L    Hemoglobin 12.1 (L) 13.3 - 17.7 g/dL    Hematocrit 38.0 (L) 40.0 - 53.0 %    MCV 82 78 - 100 fl    MCH 26.2 (L) 26.5 - 33.0 pg    MCHC 31.8 31.5 - 36.5 g/dL    RDW 14.2 10.0 - 15.0 %    Platelet Count 161 150 - 450 10e9/L    Diff Method Automated Method     % Neutrophils 68.2 %    % Lymphocytes 21.6 %    % Monocytes 5.2 %    % Eosinophils 0.1 %    %  Basophils 0.4 %    % Immature Granulocytes 4.5 %    Nucleated RBCs 0 0 /100    Absolute Neutrophil 5.0 1.6 - 8.3 10e9/L    Absolute Lymphocytes 1.6 0.8 - 5.3 10e9/L    Absolute Monocytes 0.4 0.0 - 1.3 10e9/L    Absolute Eosinophils 0.0 0.0 - 0.7 10e9/L    Absolute Basophils 0.0 0.0 - 0.2 10e9/L    Abs Immature Granulocytes 0.3 0 - 0.4 10e9/L    Absolute Nucleated RBC 0.0    Basic metabolic panel   Result Value Ref Range    Sodium 137 133 - 144 mmol/L    Potassium 3.8 3.4 - 5.3 mmol/L    Chloride 102 94 - 109 mmol/L    Carbon Dioxide 31 20 - 32 mmol/L    Anion Gap 4 3 - 14 mmol/L    Glucose 74 70 - 99 mg/dL    Urea Nitrogen 16 7 - 30 mg/dL    Creatinine 0.44 (L) 0.66 - 1.25 mg/dL    GFR Estimate >90 >60 mL/min/[1.73_m2]    GFR Estimate If Black >90 >60 mL/min/[1.73_m2]    Calcium 7.5 (L) 8.5 - 10.1 mg/dL     Radiology:  Recent Results (from the past 24 hour(s))   CT Abdomen pelvis w contrast*    Narrative    CT of the Abdomen and Pelvis with contrast, 10/1/2019 10:38 AM.    Comparison: Ultrasound 9/1/2019.    History: lymphoblastic lymphoma; T lymphoblastic lymphoma (H).     Technique: Axial images of the abdomen and pelvis were obtained with  contrast. 98 mL of Isovue 300 was administered.    Findings:  Chest: Esophagus appears unremarkable. No suspicious lung nodules. No  evidence of lung infection. Heart size within normal limits.. Moderate  left pleural effusion with linear basilar atelectasis.       Abdomen and Pelvis: There are no suspicious hepatic lesions.  Hepatomegaly measuring 15.3 cm. No intrahepatic or extrahepatic  biliary dilatation. No opaque gallbladder calculi. Pancreas  unremarkable. Spleen size within normal limits. No suspicious adrenal  mass lesions. Symmetric nephrographic renal phase. No evidence of  hydronephrosis. Visualized ureters and urinary bladder is  unremarkable. Prostate is unremarkable. No diverticulitis. No evidence  of bowel obstruction. No free fluid. Appendix unremarkable.  Abdominal  vasculature unremarkable. Mildly prominent scattered mesenteric lymph  nodes.     Bones and Soft Tissues: No suspicious osseous lesions. No suspicious  mass.      Impression    Impression:   1. No suspicious adenopathy within the abdomen and pelvis.  2. Moderate left pleural effusion with associated atelectasis.  3. Borderline hepatomegaly.    I have personally reviewed the examination and initial interpretation  and I agree with the findings.    SHENA ORTIZ MD   CT Soft Tissue Neck w Contrast    Narrative    CT SOFT TISSUE NECK W CONTRAST 10/1/2019 10:41 AM    History:  t cell lymphoma, restaging; T lymphoblastic lymphoma (H)  ICD-10: T lymphoblastic lymphoma (H)      Comparison:  CT chest 9/27/2019     Technique: Following intravenous administration of nonionic iodinated  contrast medium, thin section helical CT images were obtained from the  skull base down to the level of the aortic arch.  Axial, coronal and  sagittal reformations were performed with 2-3 mm slice thickness  reconstruction. Images were reviewed in soft tissue, lung and bone  windows.    Contrast: 98ml's Isovue 300    Findings:   Evaluation of the mucosal space demonstrates no evident abnormality in  the nasopharynx, oropharynx, hypopharynx or the glottis. The tongue  base appears normal. The major salivary glands appear mildly enlarged  and symmetric, but are otherwise unremarkable. The thyroid gland  appears normal.    There is no evident cervical lymphadenopathy. The fascial spaces in  the neck are intact bilaterally. There is occlusion of the left and  right internal jugular veins, with reconstitution of flow distally, as  demonstrated on the comparison ultrasound on 9/26/2019.     Evaluation of the osseous structures demonstrate no worrisome lytic or  sclerotic lesion. No overt spinal canal or neuroforaminal stenosis.  The visualized paranasal sinuses are clear. The mastoid air cells are  clear.     Moderate left-sided pleural  effusion. Soft tissue attenuation in the  anterior mediastinum, similar to the previous exam of 9/27/2019,  likely represents thymic tissue.      Impression    Impression:  1. No evident mass or lymphadenopathy in the neck.  2. Occlusion of both the right and left internal jugular veins, as  demonstrated on the comparison ultrasound on 9/26/2019.  3. Moderate left pleural effusion, increased since 9/27/2019.    I have personally reviewed the examination and initial interpretation  and I agree with the findings.    LEYDA BAER MD   US Upper Extremity Venous Duplex Right    Narrative    EXAMINATION: US UPPER EXTREMITY VENOUS DUPLEX RIGHT  10/1/2019 2:39 PM       CLINICAL HISTORY: T lymphoma, end of induction, h/o bilateral UE DVT,  fluid fluctuance with slight erythema noted; T lymphoblastic lymphoma  (H)    COMPARISON: 9/26/2019        PROCEDURE COMMENTS: Ultrasound was performed of the deep venous system  of the right upper extremity using grayscale, color, and spectral  Doppler.    FINDINGS:  Unchanged occlusion of the right internal jugular, subclavian, basilic  and one of the paired brachial veins.  Nonocclusive thrombus of the  innominate and axillary veins.    Extensive soft tissue edema without fluid collection of the  antecubital fossa.      Impression    IMPRESSION:  1. Extensive soft tissue edema without fluid collection of the  antecubital fossa.  2. Extensive bilateral upper extremity deep venous thrombosis as  detailed above, slightly improved in the axillary vein.    Whit Yu was notified of the above findings by Dr. John at 2:50  10/1/2019.    I have personally reviewed the examination and initial interpretation  and I agree with the findings.    SHENA ORTIZ MD       Assessment:  Lazaro Lund is a 19 year old young man with newly diagnosed T Cell lymphoblastic lymphoma (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  He is being treated per [facility] protocol [id number] and is Day 29 today with his course recently  complicated by extensive bilateral DVT, on lovenox for anticoagulation. His presentation was complicated by a pleural and pericardial effusion with mass effect leading to cardiac tamponade.  Fortunately as his mass has responded to treatment these have improved.  His therapy is being modified to include dexamethasone (instead of prednisone) which is now considered standard of care. Ultrasound obtained given concerns for edema and new discomfort of RUE, no fluid collection noted which is less worrisome for infection. Suspect edema is dependent. Folliculitis which is progressing and involves lumbar spine skin surface. Pharyngeal candidiasis. Blood counts look good as do electrolytes.     Plan:   1) Obtained U/S to rule out infection as source of edema at prior PICC site. Encouraged Lazaro to elevate arm and use it. OK to leave open to air. Instructed to call for worsening swelling or pain, redness, warmth, fevers or rigors.  2) Resume lovenox dosing tonight. This will be held the night prior to and morning of sedated LPs. Will get anti-Xa level this Thursday, taking dose around 8am with labs around noon. Will maintain platelet count > 30K while on anticoagulation.  3) Deferred Day 29 LP w/ IT chemo due to concern for puncturing skin through possibly infectious lesions risking potential seeding of CSF. He was CNS negative at dx and much consideration was given in weighing risks/benefits of delaying procedure today. Primary oncology team will make arrangements to make up missed dose.   4) Given significant spinal headache will plan for IV caffeine following LPs in the future.   5) Cardiology follow up next month given presenting cardiac tamponade and pericardial effusion along with recent abnormal EKG.  6) Primary team to f/u with family re: vitamin D deficiency  7) Primary team to f/u with family re: scan results/plan  8) Start keflex 500mg PO BID x 7 days for folliculitis  9) Start fluconazole 400mg PO daily x 14 days  for candidiasis. Could consider stopping early if resolution prior to 2 week course.   10) RTC on Thursday for labs, exam and CXR. Could offer flu shot to him at that visit. It appears he hasn't received this in the past.

## 2019-10-01 NOTE — OR NURSING
Per Whit Yu, NP, pt can eat, discontinue PIV and go home as soon as prescriptions have been received from pharmacy (they will be delivered to [facility]). Will continue to monitor.

## 2019-10-01 NOTE — LETTER
10/1/2019      RE: Lazaro Lund  [id number] [facility] [id number]       Pediatric Hematology/Oncology Clinic Note    Lazaro Lund is a 19 year old young man with newly diagnosed T-cell lymphoblastic lymphoma. Lazaro presented with acute onset cough and was found to have an anterior mediastinal mass and malignant left-sided pleural effusion.  A CT guided biopsy was obtained at [facility] and pathology was consistent with T-cell leukemia vs lymphoma.  He was admitted to [facility] oncology service 8/30 and started on treatment per [id number].  He had a pleural effusion that required a chest tube and a pericardial effusion that was not drained. His Induction was complicated by cardiac compression secondary to his mass leading to tamponade, this improved through out his hospital stay.  He also had dysphagia that improved with treatment of his mass, swallow study was normal. His course was recently complicated by extensive bilateral UE DVTs for which anticoagulation was started during hospitalization last week. He presents to [facility] for Day 29 sedated LP with IT chemo following end of induction imaging. He is initially unaccompanied, but is joined by mom toward end of visit.    HPI:   Since discharge, Lazaro has done OK. He feels his right arm is maybe a little more swollen and is now tender. He notices that the skin was irritated, but he also has a little discomfort in his forearm where there wasn't a bandage. He is still having clear drainage from the prior PICC insertion site. No fevers or shaking chills. His lovenox has been going well, he held his doses last night and this morning in preparation for his procedures. No bleeding symptoms. The bruising at the PICC site has gotten a little better. His rash on his torso has gotten a little worse. It is non-pruritic and non-painful. No vesicular lesions. Leg soreness is better. No vision concerns. He is not noticing blurred vision. No further  pinching sensation in his chest. Mild cough really unchanged. No SOB, dyspnea or respiratory concerns.     Review of systems:  General: Stable energy level.   HEENT: Denies concerns with vision or hearing.   Respiratory: See HPI.  Cardiovascular: No chest pain or palpitations.   Endocrine: No hot/cold intolerance. No increase thirst or urination.   GI: No n/v/d/c or abdominal pain. Stools regular with 2 senna once daily.  : No difficulty with urination.   Skin: See HPI.  Neuro: No weakness or numbness.   MSK: No change in ROM or function. No tripping or falling.   Heme: No epistaxis, oozing gums or easy bruising.       PMH:   Past Medical History:   Diagnosis Date     DVT of upper extremity (deep vein thrombosis) (H) 09/26/2019    Bilateral      Migraine 2006    have resolved     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   Spinal HA following LP  TPMT shows Intermediate activity    PFMH:   Family History   Problem Relation Age of Onset     No Known Problems Mother      No Known Problems Father      Asthma Brother      Thyroid Cancer Paternal Grandmother      Melanoma Paternal Aunt        Social History: Lazaro previously lived at home with his dad and step mom in [place] but is now staying with his mom in [place].  He was working full time at [employer] in [place] prior to his diagnosis.    Current Medications:  Current Outpatient Medications   Medication Sig Dispense Refill     cephALEXin (KEFLEX) 500 MG capsule Take 1 capsule (500 mg) by mouth 2 times daily for 7 days 14 capsule 0     fluconazole (DIFLUCAN) 200 MG tablet Take 2 tablets (400 mg) by mouth daily for 14 days 28 tablet 0     melatonin 3 MG tablet Take 1 tablet (3 mg) by mouth At Bedtime 30 tablet 1     pantoprazole (PROTONIX) 40 MG EC tablet Take 1 tablet (40 mg) by mouth daily . This acid blocker helps prevent stomach pain while on your decadron chemotherapy. 30 tablet 0     sulfamethoxazole-trimethoprim (BACTRIM DS/SEPTRA DS) 800-160 MG tablet Take  1 tablet by mouth Every Mon, Tues two times daily 16 tablet 0     diphenhydrAMINE (BENADRYL) 25 MG capsule Take 1-2 capsules (25-50 mg) by mouth every 6 hours as needed (Breakthrough Nausea and Vomiting ) 30 capsule 1     enoxaparin (LOVENOX) 60 MG/0.6ML syringe Inject 0.6 mLs (60 mg) Subcutaneous every 12 hours for 30 doses 18 mL 1     ondansetron (ZOFRAN) 8 MG tablet Take 1 tablet (8 mg) by mouth every 6 hours as needed for nausea 30 tablet 1     oxyCODONE (ROXICODONE) 5 MG tablet Take 1 tablet (5 mg) by mouth every 6 hours as needed for moderate to severe pain 10 tablet 0     polyethylene glycol (MIRALAX/GLYCOLAX) packet Take 17 g by mouth daily 30 packet 0     sennosides (SENOKOT) 8.6 MG tablet Take 2 tablets by mouth 2 times daily as needed for constipation 60 each 1   Above medications reviewed. Keflex and fluconazole new Rx effective today.     Physical Exam:   Temp:  [98.5  F (36.9  C)] 98.5  F (36.9  C)  Heart Rate:  [86] 86  Resp:  [16] 16  BP: (119)/(68) 119/68  SpO2:  [98 %] 98 %  Wt Readings from Last 4 Encounters:   10/01/19 76.4 kg (168 lb 6.9 oz)   09/26/19 77.6 kg (171 lb 1.2 oz)   09/26/19 77.9 kg (171 lb 11.8 oz)   09/24/19 79 kg (174 lb 2.6 oz)   Weight at diagnosis was 75.2kg, Lazaro considers his baseline weight to be 173-175lbs before he initially got sick  General: Lazaro is alert, interactive and appropriate. NAD. He is cushingoid.   HEENT: Skull is atrauamatic and normocephalic. Full head of hair. PERRLA, sclera are non icteric and not injected, EOM are intact. Nares are patent without drainage. Oropharynx with ulcerations lined with white coating. Buccal mucosa and tongue spared. MMM. Tympanic membranes are opaque bilaterally with light reflex and landmarks present.  Lymph:  Neck is supple without lymphadenopathy.  There is no supraclavicular, axillary or inguinal lymphadenopathy palpated.  Cardiovascular:  HR is regular, S1, S2 no murmur.  Capillary refill is < 2 seconds. Right arm with  edema dorsally at elbow and bicep. Some firmness and redness near prior PICC insertion site. Slightly warm to touch. Serosanguinous drainage noted from site by nurse. Cap refill < 2 sec. Peripheral pulses 2+/=.   Measurements of arms as follows:   Right arm at AC: 33cm (up from 32cm)  Right bicep (10cm from AC): stable at 32.5cm  Respiratory: No cough noted. Respirations are easy.  Lungs are clear to auscultation through out.  No crackles or wheezes.  Gastrointestinal:  BS present in all quadrants.  Abdomen is soft and non-tender.  No hepatosplenomegaly or masses are palpated.  Skin: Unwrapped coban dressing of RUE, small amount of clear drainage noted on dressing. Purpura near old PICC site on right arm. Diffuse truncal and upper extremity papular to pustule non-blanching erythematous rash. Extends to just past nipple line anteriorly with 1-2 mm pustules noted. Rash on back concentrated with papules extending down to low back with some lesions in lumbar back region. No vesicular lesions. One 3-4mm crusted lesions over thoracic spine.   Neurological:  CN 2-12 grossly intact. Gait is normal.  No issues with balance. Sensation intact in hands and feet.     Musculoskeletal:  Good strength and ROM in all extremities.  Strong dorsiflexion at ankles and great toes (5/5) bilaterally without any pain at the Achilles.    Labs:   Results for orders placed or performed during the hospital encounter of 10/01/19 (from the past 24 hour(s))   Vitamin D deficiency screening   Result Value Ref Range    Vitamin D Deficiency screening 11 (L) 20 - 75 ug/L   CBC with platelets differential   Result Value Ref Range    WBC 7.4 4.0 - 11.0 10e9/L    RBC Count 4.61 4.4 - 5.9 10e12/L    Hemoglobin 12.1 (L) 13.3 - 17.7 g/dL    Hematocrit 38.0 (L) 40.0 - 53.0 %    MCV 82 78 - 100 fl    MCH 26.2 (L) 26.5 - 33.0 pg    MCHC 31.8 31.5 - 36.5 g/dL    RDW 14.2 10.0 - 15.0 %    Platelet Count 161 150 - 450 10e9/L    Diff Method Automated Method     %  Neutrophils 68.2 %    % Lymphocytes 21.6 %    % Monocytes 5.2 %    % Eosinophils 0.1 %    % Basophils 0.4 %    % Immature Granulocytes 4.5 %    Nucleated RBCs 0 0 /100    Absolute Neutrophil 5.0 1.6 - 8.3 10e9/L    Absolute Lymphocytes 1.6 0.8 - 5.3 10e9/L    Absolute Monocytes 0.4 0.0 - 1.3 10e9/L    Absolute Eosinophils 0.0 0.0 - 0.7 10e9/L    Absolute Basophils 0.0 0.0 - 0.2 10e9/L    Abs Immature Granulocytes 0.3 0 - 0.4 10e9/L    Absolute Nucleated RBC 0.0    Basic metabolic panel   Result Value Ref Range    Sodium 137 133 - 144 mmol/L    Potassium 3.8 3.4 - 5.3 mmol/L    Chloride 102 94 - 109 mmol/L    Carbon Dioxide 31 20 - 32 mmol/L    Anion Gap 4 3 - 14 mmol/L    Glucose 74 70 - 99 mg/dL    Urea Nitrogen 16 7 - 30 mg/dL    Creatinine 0.44 (L) 0.66 - 1.25 mg/dL    GFR Estimate >90 >60 mL/min/[1.73_m2]    GFR Estimate If Black >90 >60 mL/min/[1.73_m2]    Calcium 7.5 (L) 8.5 - 10.1 mg/dL     Radiology:  Recent Results (from the past 24 hour(s))   CT Abdomen pelvis w contrast*    Narrative    CT of the Abdomen and Pelvis with contrast, 10/1/2019 10:38 AM.    Comparison: Ultrasound 9/1/2019.    History: lymphoblastic lymphoma; T lymphoblastic lymphoma (H).     Technique: Axial images of the abdomen and pelvis were obtained with  contrast. 98 mL of Isovue 300 was administered.    Findings:  Chest: Esophagus appears unremarkable. No suspicious lung nodules. No  evidence of lung infection. Heart size within normal limits.. Moderate  left pleural effusion with linear basilar atelectasis.       Abdomen and Pelvis: There are no suspicious hepatic lesions.  Hepatomegaly measuring 15.3 cm. No intrahepatic or extrahepatic  biliary dilatation. No opaque gallbladder calculi. Pancreas  unremarkable. Spleen size within normal limits. No suspicious adrenal  mass lesions. Symmetric nephrographic renal phase. No evidence of  hydronephrosis. Visualized ureters and urinary bladder is  unremarkable. Prostate is unremarkable. No  diverticulitis. No evidence  of bowel obstruction. No free fluid. Appendix unremarkable. Abdominal  vasculature unremarkable. Mildly prominent scattered mesenteric lymph  nodes.     Bones and Soft Tissues: No suspicious osseous lesions. No suspicious  mass.      Impression    Impression:   1. No suspicious adenopathy within the abdomen and pelvis.  2. Moderate left pleural effusion with associated atelectasis.  3. Borderline hepatomegaly.    I have personally reviewed the examination and initial interpretation  and I agree with the findings.    SHENA ORTIZ MD   CT Soft Tissue Neck w Contrast    Narrative    CT SOFT TISSUE NECK W CONTRAST 10/1/2019 10:41 AM    History:  t cell lymphoma, restaging; T lymphoblastic lymphoma (H)  ICD-10: T lymphoblastic lymphoma (H)      Comparison:  CT chest 9/27/2019     Technique: Following intravenous administration of nonionic iodinated  contrast medium, thin section helical CT images were obtained from the  skull base down to the level of the aortic arch.  Axial, coronal and  sagittal reformations were performed with 2-3 mm slice thickness  reconstruction. Images were reviewed in soft tissue, lung and bone  windows.    Contrast: 98ml's Isovue 300    Findings:   Evaluation of the mucosal space demonstrates no evident abnormality in  the nasopharynx, oropharynx, hypopharynx or the glottis. The tongue  base appears normal. The major salivary glands appear mildly enlarged  and symmetric, but are otherwise unremarkable. The thyroid gland  appears normal.    There is no evident cervical lymphadenopathy. The fascial spaces in  the neck are intact bilaterally. There is occlusion of the left and  right internal jugular veins, with reconstitution of flow distally, as  demonstrated on the comparison ultrasound on 9/26/2019.     Evaluation of the osseous structures demonstrate no worrisome lytic or  sclerotic lesion. No overt spinal canal or neuroforaminal stenosis.  The visualized  paranasal sinuses are clear. The mastoid air cells are  clear.     Moderate left-sided pleural effusion. Soft tissue attenuation in the  anterior mediastinum, similar to the previous exam of 9/27/2019,  likely represents thymic tissue.      Impression    Impression:  1. No evident mass or lymphadenopathy in the neck.  2. Occlusion of both the right and left internal jugular veins, as  demonstrated on the comparison ultrasound on 9/26/2019.  3. Moderate left pleural effusion, increased since 9/27/2019.    I have personally reviewed the examination and initial interpretation  and I agree with the findings.    LEYDA BAER MD   US Upper Extremity Venous Duplex Right    Narrative    EXAMINATION: US UPPER EXTREMITY VENOUS DUPLEX RIGHT  10/1/2019 2:39 PM       CLINICAL HISTORY: T lymphoma, end of induction, h/o bilateral UE DVT,  fluid fluctuance with slight erythema noted; T lymphoblastic lymphoma  (H)    COMPARISON: 9/26/2019        PROCEDURE COMMENTS: Ultrasound was performed of the deep venous system  of the right upper extremity using grayscale, color, and spectral  Doppler.    FINDINGS:  Unchanged occlusion of the right internal jugular, subclavian, basilic  and one of the paired brachial veins.  Nonocclusive thrombus of the  innominate and axillary veins.    Extensive soft tissue edema without fluid collection of the  antecubital fossa.      Impression    IMPRESSION:  1. Extensive soft tissue edema without fluid collection of the  antecubital fossa.  2. Extensive bilateral upper extremity deep venous thrombosis as  detailed above, slightly improved in the axillary vein.    Whit Yu was notified of the above findings by Dr. John at 2:50  10/1/2019.    I have personally reviewed the examination and initial interpretation  and I agree with the findings.    SHENA ORTIZ MD       Assessment:  Lazaro Lund is a 19 year old young man with newly diagnosed T Cell lymphoblastic lymphoma (marrow and CNS  negative).  He is being treated per [facility] protocol [id number] and is Day 29 today with his course recently complicated by extensive bilateral DVT, on lovenox for anticoagulation. His presentation was complicated by a pleural and pericardial effusion with mass effect leading to cardiac tamponade.  Fortunately as his mass has responded to treatment these have improved.  His therapy is being modified to include dexamethasone (instead of prednisone) which is now considered standard of care. Ultrasound obtained given concerns for edema and new discomfort of RUE, no fluid collection noted which is less worrisome for infection. Suspect edema is dependent. Folliculitis which is progressing and involves lumbar spine skin surface. Pharyngeal candidiasis. Blood counts look good as do electrolytes.     Plan:   1) Obtained U/S to rule out infection as source of edema at prior PICC site. Encouraged Lazaro to elevate arm and use it. OK to leave open to air. Instructed to call for worsening swelling or pain, redness, warmth, fevers or rigors.  2) Resume lovenox dosing tonight. This will be held the night prior to and morning of sedated LPs. Will get anti-Xa level this Thursday, taking dose around 8am with labs around noon. Will maintain platelet count > 30K while on anticoagulation.  3) Deferred Day 29 LP w/ IT chemo due to concern for puncturing skin through possibly infectious lesions risking potential seeding of CSF. He was CNS negative at dx and much consideration was given in weighing risks/benefits of delaying procedure today. Primary oncology team will make arrangements to make up missed dose.   4) Given significant spinal headache will plan for IV caffeine following LPs in the future.   5) Cardiology follow up next month given presenting cardiac tamponade and pericardial effusion along with recent abnormal EKG.  6) Primary team to f/u with family re: vitamin D deficiency  7) Primary team to f/u with family re: scan  results/plan  8) Start keflex 500mg PO BID x 7 days for folliculitis  9) Start fluconazole 400mg PO daily x 14 days for candidiasis. Could consider stopping early if resolution prior to 2 week course.   10) RTC on Thursday for labs, exam and CXR. Could offer flu shot to him at that visit. It appears he hasn't received this in the past.      YOHAN Cazares CNP

## 2019-10-01 NOTE — OR NURSING
Pt received meds from pharmacy but is waiting for his grandma to pick him up. No needs at this time.

## 2019-10-01 NOTE — ANESTHESIA CARE TRANSFER NOTE
Patient: Lazaro Lund    Procedure(s):  Lumbar puncture with IT Chemo    Diagnosis: T lymphoblastic lymphoma  Diagnosis Additional Information: No value filed.    Anesthesia Type:   General     Note:  Airway :Room Air  Patient transferred to: Recovery  Handoff Report: Identifed the Patient, Identified the Reponsible Provider, Reviewed the pertinent medical history, Discussed the surgical course, Reviewed Intra-OP anesthesia mangement and issues during anesthesia, Set expectations for post-procedure period and Allowed opportunity for questions and acknowledgement of understanding      Vitals: (Last set prior to Anesthesia Care Transfer)    CRNA VITALS  10/1/2019 1333 - 10/1/2019 1408      10/1/2019             Pulse:  81    Ht Rate:  81    SpO2:  97 %                Electronically Signed By: YOHAN Ashraf CRNA  October 1, 2019  2:08 PM

## 2019-10-02 RX ORDER — METHYLPREDNISOLONE SODIUM SUCCINATE 125 MG/2ML
125 INJECTION, POWDER, LYOPHILIZED, FOR SOLUTION INTRAMUSCULAR; INTRAVENOUS
Status: CANCELLED | OUTPATIENT
Start: 2019-10-31

## 2019-10-02 RX ORDER — SODIUM CHLORIDE 9 MG/ML
200 INJECTION, SOLUTION INTRAVENOUS CONTINUOUS PRN
Status: CANCELLED | OUTPATIENT
Start: 2019-10-17

## 2019-10-02 RX ORDER — CYTARABINE 100 MG/ML
75 INJECTION, SOLUTION INTRATHECAL; INTRAVENOUS; SUBCUTANEOUS ONCE
Status: CANCELLED
Start: 2019-10-10

## 2019-10-02 RX ORDER — EPINEPHRINE 1 MG/ML
0.3 INJECTION, SOLUTION, CONCENTRATE INTRAVENOUS EVERY 5 MIN PRN
Status: CANCELLED | OUTPATIENT
Start: 2019-10-10

## 2019-10-02 RX ORDER — ALBUTEROL SULFATE 90 UG/1
1-2 AEROSOL, METERED RESPIRATORY (INHALATION)
Status: CANCELLED
Start: 2019-10-24

## 2019-10-02 RX ORDER — SODIUM CHLORIDE 9 MG/ML
200 INJECTION, SOLUTION INTRAVENOUS CONTINUOUS PRN
Status: CANCELLED | OUTPATIENT
Start: 2019-10-31

## 2019-10-02 RX ORDER — SODIUM CHLORIDE 9 MG/ML
200 INJECTION, SOLUTION INTRAVENOUS CONTINUOUS PRN
Status: CANCELLED | OUTPATIENT
Start: 2019-10-10

## 2019-10-02 RX ORDER — DIPHENHYDRAMINE HYDROCHLORIDE 50 MG/ML
50 INJECTION INTRAMUSCULAR; INTRAVENOUS
Status: CANCELLED
Start: 2019-10-24

## 2019-10-02 RX ORDER — SODIUM CHLORIDE 9 MG/ML
INJECTION, SOLUTION INTRAVENOUS CONTINUOUS
Status: CANCELLED
Start: 2019-10-10

## 2019-10-02 RX ORDER — ALBUTEROL SULFATE 90 UG/1
1-2 AEROSOL, METERED RESPIRATORY (INHALATION)
Status: CANCELLED
Start: 2019-10-10

## 2019-10-02 RX ORDER — CYTARABINE 100 MG/ML
75 INJECTION, SOLUTION INTRATHECAL; INTRAVENOUS; SUBCUTANEOUS ONCE
Status: CANCELLED
Start: 2019-10-17

## 2019-10-02 RX ORDER — EPINEPHRINE 1 MG/ML
0.3 INJECTION, SOLUTION, CONCENTRATE INTRAVENOUS EVERY 5 MIN PRN
Status: CANCELLED | OUTPATIENT
Start: 2019-10-17

## 2019-10-02 RX ORDER — METHYLPREDNISOLONE SODIUM SUCCINATE 125 MG/2ML
125 INJECTION, POWDER, LYOPHILIZED, FOR SOLUTION INTRAMUSCULAR; INTRAVENOUS
Status: CANCELLED | OUTPATIENT
Start: 2019-10-24

## 2019-10-02 RX ORDER — EPINEPHRINE 1 MG/ML
0.3 INJECTION, SOLUTION, CONCENTRATE INTRAVENOUS EVERY 5 MIN PRN
Status: CANCELLED | OUTPATIENT
Start: 2019-10-31

## 2019-10-02 RX ORDER — ONDANSETRON 2 MG/ML
8 INJECTION INTRAMUSCULAR; INTRAVENOUS ONCE
Status: CANCELLED
Start: 2019-10-17

## 2019-10-02 RX ORDER — ONDANSETRON 2 MG/ML
8 INJECTION INTRAMUSCULAR; INTRAVENOUS ONCE
Status: CANCELLED
Start: 2019-10-24

## 2019-10-02 RX ORDER — ALBUTEROL SULFATE 90 UG/1
1-2 AEROSOL, METERED RESPIRATORY (INHALATION)
Status: CANCELLED
Start: 2019-10-31

## 2019-10-02 RX ORDER — ONDANSETRON 2 MG/ML
8 INJECTION INTRAMUSCULAR; INTRAVENOUS ONCE
Status: CANCELLED
Start: 2019-10-10

## 2019-10-02 RX ORDER — ALBUTEROL SULFATE 0.83 MG/ML
2.5 SOLUTION RESPIRATORY (INHALATION)
Status: CANCELLED | OUTPATIENT
Start: 2019-10-10

## 2019-10-02 RX ORDER — ALBUTEROL SULFATE 0.83 MG/ML
2.5 SOLUTION RESPIRATORY (INHALATION)
Status: CANCELLED | OUTPATIENT
Start: 2019-10-24

## 2019-10-02 RX ORDER — DIPHENHYDRAMINE HYDROCHLORIDE 50 MG/ML
50 INJECTION INTRAMUSCULAR; INTRAVENOUS
Status: CANCELLED
Start: 2019-10-17

## 2019-10-02 RX ORDER — METHYLPREDNISOLONE SODIUM SUCCINATE 125 MG/2ML
125 INJECTION, POWDER, LYOPHILIZED, FOR SOLUTION INTRAMUSCULAR; INTRAVENOUS
Status: CANCELLED | OUTPATIENT
Start: 2019-10-17

## 2019-10-02 RX ORDER — ALBUTEROL SULFATE 0.83 MG/ML
2.5 SOLUTION RESPIRATORY (INHALATION)
Status: CANCELLED | OUTPATIENT
Start: 2019-10-31

## 2019-10-02 RX ORDER — ALBUTEROL SULFATE 90 UG/1
1-2 AEROSOL, METERED RESPIRATORY (INHALATION)
Status: CANCELLED
Start: 2019-10-17

## 2019-10-02 RX ORDER — EPINEPHRINE 1 MG/ML
0.3 INJECTION, SOLUTION, CONCENTRATE INTRAVENOUS EVERY 5 MIN PRN
Status: CANCELLED | OUTPATIENT
Start: 2019-10-24

## 2019-10-02 RX ORDER — SODIUM CHLORIDE 9 MG/ML
200 INJECTION, SOLUTION INTRAVENOUS CONTINUOUS PRN
Status: CANCELLED | OUTPATIENT
Start: 2019-10-24

## 2019-10-02 RX ORDER — ONDANSETRON 2 MG/ML
8 INJECTION INTRAMUSCULAR; INTRAVENOUS ONCE
Status: CANCELLED
Start: 2019-10-31

## 2019-10-02 RX ORDER — ALBUTEROL SULFATE 0.83 MG/ML
2.5 SOLUTION RESPIRATORY (INHALATION)
Status: CANCELLED | OUTPATIENT
Start: 2019-10-17

## 2019-10-02 RX ORDER — DIPHENHYDRAMINE HYDROCHLORIDE 50 MG/ML
50 INJECTION INTRAMUSCULAR; INTRAVENOUS
Status: CANCELLED
Start: 2019-10-10

## 2019-10-02 RX ORDER — METHYLPREDNISOLONE SODIUM SUCCINATE 125 MG/2ML
125 INJECTION, POWDER, LYOPHILIZED, FOR SOLUTION INTRAMUSCULAR; INTRAVENOUS
Status: CANCELLED | OUTPATIENT
Start: 2019-10-10

## 2019-10-02 RX ORDER — DIPHENHYDRAMINE HYDROCHLORIDE 50 MG/ML
50 INJECTION INTRAMUSCULAR; INTRAVENOUS
Status: CANCELLED
Start: 2019-10-31

## 2019-10-03 ENCOUNTER — OFFICE VISIT (OUTPATIENT)
Dept: PEDIATRIC HEMATOLOGY/ONCOLOGY | Facility: CLINIC | Age: 19
End: 2019-10-03

## 2019-10-03 ENCOUNTER — OFFICE VISIT (OUTPATIENT)
Dept: PEDIATRIC HEMATOLOGY/ONCOLOGY | Facility: CLINIC | Age: 19
End: 2019-10-03
Attending: NURSE PRACTITIONER
Payer: MEDICAID

## 2019-10-03 ENCOUNTER — HOSPITAL ENCOUNTER (OUTPATIENT)
Dept: GENERAL RADIOLOGY | Facility: CLINIC | Age: 19
Discharge: HOME | End: 2019-10-03
Attending: NURSE PRACTITIONER | Admitting: NURSE PRACTITIONER
Payer: MEDICAID

## 2019-10-03 VITALS
RESPIRATION RATE: 18 BRPM | DIASTOLIC BLOOD PRESSURE: 67 MMHG | TEMPERATURE: 99.3 F | SYSTOLIC BLOOD PRESSURE: 112 MMHG | HEIGHT: 72 IN | WEIGHT: 168.43 LBS | BODY MASS INDEX: 22.81 KG/M2 | OXYGEN SATURATION: 100 % | HEART RATE: 99 BPM

## 2019-10-03 DIAGNOSIS — C85.90 LYMPHOMA, UNSPECIFIED BODY REGION, UNSPECIFIED LYMPHOMA TYPE (H): ICD-10-CM

## 2019-10-03 DIAGNOSIS — C83.50 T LYMPHOBLASTIC LYMPHOMA (H): ICD-10-CM

## 2019-10-03 DIAGNOSIS — I82.623 ACUTE DEEP VEIN THROMBOSIS (DVT) OF OTHER VEIN OF BOTH UPPER EXTREMITIES (H): ICD-10-CM

## 2019-10-03 DIAGNOSIS — C95.00 ACUTE LEUKEMIA NOT HAVING ACHIEVED REMISSION (H): ICD-10-CM

## 2019-10-03 DIAGNOSIS — E55.9 VITAMIN D DEFICIENCY: ICD-10-CM

## 2019-10-03 DIAGNOSIS — C83.50 T LYMPHOBLASTIC LYMPHOMA (H): Primary | ICD-10-CM

## 2019-10-03 DIAGNOSIS — Z71.9 ENCOUNTER FOR COUNSELING: Primary | ICD-10-CM

## 2019-10-03 LAB
BASOPHILS # BLD AUTO: 0 10E9/L (ref 0–0.2)
BASOPHILS NFR BLD AUTO: 0.2 %
DIFFERENTIAL METHOD BLD: ABNORMAL
EOSINOPHIL # BLD AUTO: 0 10E9/L (ref 0–0.7)
EOSINOPHIL NFR BLD AUTO: 0 %
ERYTHROCYTE [DISTWIDTH] IN BLOOD BY AUTOMATED COUNT: 14.8 % (ref 10–15)
HCT VFR BLD AUTO: 38.9 % (ref 40–53)
HGB BLD-MCNC: 11.8 G/DL (ref 13.3–17.7)
IMM GRANULOCYTES # BLD: 0.2 10E9/L (ref 0–0.4)
IMM GRANULOCYTES NFR BLD: 4.6 %
LMWH PPP CHRO-ACNC: 0.67 IU/ML
LYMPHOCYTES # BLD AUTO: 1.1 10E9/L (ref 0.8–5.3)
LYMPHOCYTES NFR BLD AUTO: 26.2 %
MCH RBC QN AUTO: 25.8 PG (ref 26.5–33)
MCHC RBC AUTO-ENTMCNC: 30.3 G/DL (ref 31.5–36.5)
MCV RBC AUTO: 85 FL (ref 78–100)
MONOCYTES # BLD AUTO: 0.3 10E9/L (ref 0–1.3)
MONOCYTES NFR BLD AUTO: 6.3 %
NEUTROPHILS # BLD AUTO: 2.7 10E9/L (ref 1.6–8.3)
NEUTROPHILS NFR BLD AUTO: 62.7 %
NRBC # BLD AUTO: 0 10*3/UL
NRBC BLD AUTO-RTO: 0 /100
PLATELET # BLD AUTO: 192 10E9/L (ref 150–450)
RBC # BLD AUTO: 4.58 10E12/L (ref 4.4–5.9)
WBC # BLD AUTO: 4.3 10E9/L (ref 4–11)

## 2019-10-03 PROCEDURE — 85520 HEPARIN ASSAY: CPT | Performed by: STUDENT IN AN ORGANIZED HEALTH CARE EDUCATION/TRAINING PROGRAM

## 2019-10-03 PROCEDURE — G0008 ADMIN INFLUENZA VIRUS VAC: HCPCS | Mod: ZF

## 2019-10-03 PROCEDURE — G0463 HOSPITAL OUTPT CLINIC VISIT: HCPCS | Mod: ZF,25

## 2019-10-03 PROCEDURE — 71046 X-RAY EXAM CHEST 2 VIEWS: CPT

## 2019-10-03 PROCEDURE — 25000128 H RX IP 250 OP 636: Mod: ZF | Performed by: NURSE PRACTITIONER

## 2019-10-03 PROCEDURE — 36415 COLL VENOUS BLD VENIPUNCTURE: CPT | Performed by: STUDENT IN AN ORGANIZED HEALTH CARE EDUCATION/TRAINING PROGRAM

## 2019-10-03 PROCEDURE — 90686 IIV4 VACC NO PRSV 0.5 ML IM: CPT | Mod: ZF | Performed by: NURSE PRACTITIONER

## 2019-10-03 PROCEDURE — 85025 COMPLETE CBC W/AUTO DIFF WBC: CPT | Performed by: STUDENT IN AN ORGANIZED HEALTH CARE EDUCATION/TRAINING PROGRAM

## 2019-10-03 RX ORDER — MULTIVIT-MIN/IRON/FOLIC ACID/K 18-600-40
2000 CAPSULE ORAL DAILY
Qty: 60 TABLET | Refills: 3 | Status: SHIPPED | OUTPATIENT
Start: 2019-10-03 | End: 2019-12-12

## 2019-10-03 RX ORDER — DIPHENHYDRAMINE HCL 25 MG
25-50 CAPSULE ORAL EVERY 6 HOURS PRN
Qty: 30 CAPSULE | Refills: 1 | Status: SHIPPED | OUTPATIENT
Start: 2019-10-03 | End: 2020-12-29

## 2019-10-03 RX ORDER — ONDANSETRON 8 MG/1
8 TABLET, FILM COATED ORAL EVERY 6 HOURS PRN
Qty: 30 TABLET | Refills: 1 | Status: ON HOLD | OUTPATIENT
Start: 2019-10-03 | End: 2019-11-15

## 2019-10-03 RX ADMIN — INFLUENZA A VIRUS A/BRISBANE/02/2018 IVR-190 (H1N1) ANTIGEN (FORMALDEHYDE INACTIVATED), INFLUENZA A VIRUS A/KANSAS/14/2017 X-327 (H3N2) ANTIGEN (FORMALDEHYDE INACTIVATED), INFLUENZA B VIRUS B/PHUKET/3073/2013 ANTIGEN (FORMALDEHYDE INACTIVATED), AND INFLUENZA B VIRUS B/MARYLAND/15/2016 BX-69A ANTIGEN (FORMALDEHYDE INACTIVATED) 0.5 ML: 15; 15; 15; 15 INJECTION, SUSPENSION INTRAMUSCULAR at 13:52

## 2019-10-03 ASSESSMENT — MIFFLIN-ST. JEOR: SCORE: 1812.75

## 2019-10-03 ASSESSMENT — PAIN SCALES - GENERAL: PAINLEVEL: NO PAIN (0)

## 2019-10-03 NOTE — PROGRESS NOTES
Pediatric Hematology/Oncology Clinic Note    Lazaro Lund is a 19 year old young man with newly diagnosed T-cell lymphoblastic lymphoma. Lazaro presented with acute onset cough and was found to have an anterior mediastinal mass and malignant left-sided pleural effusion.  A CT guided biopsy was obtained at [facility] and pathology was consistent with T-cell leukemia vs lymphoma.  He was admitted to [facility] oncology service 8/30 and started on treatment per [id number].  He had a pleural effusion that required a chest tube and a pericardial effusion that was not drained. His Induction was complicated by cardiac compression secondary to his mass leading to tamponade, this improved through out his hospital stay.  He also had dysphagia that improved with treatment of his mass, swallow study was normal. His course was recently complicated by extensive bilateral UE DVTs for which anticoagulation was started during hospitalization last week. His Day 29 LP was cancelled due to folliculitis overlying his lumbar spine.  Lazaro comes to clinic today for labs, xray and a visit.    HPI:   Since Lazaro's last visit he has been stable.  He notes his facial swelling is slowly improving.  His arm seems overall unchanged to him.   He notes some irritation at the insertion site but no pain.  It is not draining much anymore.  His lovenox injections are going well. Denies SOB. No new skin issues, he feels his folliculitis is unchanged.    Lazaro denies blurry vision.  His appetite is back to baseline.  He is ambulating without difficulty but had a hard time trying to jog recently.  No paresthesias.      Lazaro denies constipation, using senna PRN.  No fever.  He is sleeping well at night, trying to elevate his arm when he sleeps.    Review of systems:  Remainder of ROS is complete and negative.    PMH:   Past Medical History:   Diagnosis Date     DVT of upper extremity (deep vein thrombosis) (H) 09/26/2019    Bilateral      Migraine 2006     have resolved     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   Spinal HA following LP  TPMT shows Intermediate activity    PFMH:   Family History   Problem Relation Age of Onset     No Known Problems Mother      No Known Problems Father      Asthma Brother      Thyroid Cancer Paternal Grandmother      Melanoma Paternal Aunt        Social History: Lazaro previously lived at home with his dad and step mom in [place] but is now staying with his mom in [place].  He was working full time at [employer] in [place] prior to his diagnosis.    Current Medications:  Current Outpatient Medications   Medication Sig Dispense Refill     cephALEXin (KEFLEX) 500 MG capsule Take 1 capsule (500 mg) by mouth 2 times daily for 7 days 14 capsule 0     diphenhydrAMINE (BENADRYL) 25 MG capsule Take 1-2 capsules (25-50 mg) by mouth every 6 hours as needed (Breakthrough Nausea and Vomiting ) 30 capsule 1     enoxaparin (LOVENOX) 60 MG/0.6ML syringe Inject 0.6 mLs (60 mg) Subcutaneous every 12 hours for 30 doses 18 mL 1     fluconazole (DIFLUCAN) 200 MG tablet Take 2 tablets (400 mg) by mouth daily for 14 days 28 tablet 0     melatonin 3 MG tablet Take 1 tablet (3 mg) by mouth At Bedtime 30 tablet 1     ondansetron (ZOFRAN) 8 MG tablet Take 1 tablet (8 mg) by mouth every 6 hours as needed for nausea 30 tablet 1     oxyCODONE (ROXICODONE) 5 MG tablet Take 1 tablet (5 mg) by mouth every 6 hours as needed for moderate to severe pain 10 tablet 0     pantoprazole (PROTONIX) 40 MG EC tablet Take 1 tablet (40 mg) by mouth daily . This acid blocker helps prevent stomach pain while on your decadron chemotherapy. 30 tablet 0     polyethylene glycol (MIRALAX/GLYCOLAX) packet Take 17 g by mouth daily 30 packet 0     sennosides (SENOKOT) 8.6 MG tablet Take 2 tablets by mouth 2 times daily as needed for constipation 60 each 1     sulfamethoxazole-trimethoprim (BACTRIM DS/SEPTRA DS) 800-160 MG tablet Take 1 tablet by mouth Every Mon, Tues two times daily  16 tablet 0   Above medications reviewed. Taking senna PRN.    Physical Exam:   Temp:  [99.3  F (37.4  C)] 99.3  F (37.4  C)  Pulse:  [99] 99  Resp:  [18] 18  BP: (112)/(67) 112/67  SpO2:  [100 %] 100 %  Wt Readings from Last 4 Encounters:   10/03/19 76.4 kg (168 lb 6.9 oz)   10/01/19 76.4 kg (168 lb 6.9 oz)   09/26/19 77.6 kg (171 lb 1.2 oz)   09/26/19 77.9 kg (171 lb 11.8 oz)   Weight at diagnosis was 75.2kg, Lzaaro considers his baseline weight to be 173-175lbs before he initially got sick  General: Lazaro is alert, interactive and appropriate. NAD. He is cushingoid.   HEENT: Skull is atrauamatic and normocephalic.  The right side of his face is noticeably full, no venous distention.  Full head of hair. PERRLA, sclera are non icteric and not injected, EOM are intact. Nares are patent without drainage. Oropharynx clear, no plaques noted. Buccal mucosa and tongue clear. MMM. Tympanic membranes are opaque bilaterally with light reflex and landmarks present.  Lymph:  Neck is supple without lymphadenopathy.  There is no supraclavicular, axillary or inguinal lymphadenopathy palpated.  Cardiovascular:  HR is regular, S1, S2 no murmur.  Capillary refill is < 2 seconds. Right arm with edema most concentrated at the elbow but present throughout, is 1+ pitting near the elbow . PICC insertion site without warmth, erythema or drainage.  Cap refill < 2 sec. Peripheral pulses 2+/=.   Measurements of arms as follows:   Right arm at AC: 31cm (down from 31cm)  Right bicep (10cm from AC): decreased at  at 30.5cm  Right wrist 18.5cm  Respiratory: Loose cough noted. Respirations are easy.  Lungs are clear to auscultation through out.  No crackles or wheezes.  Gastrointestinal:  BS present in all quadrants.  Abdomen is soft and non-tender.  No hepatosplenomegaly or masses are palpated.  Skin: Scattered petechiae on right arm. Diffuse truncal and upper extremity papular non-blanching erythematous rash, few pustules noted.  No vesicular  lesions.   Neurological:  CN 2-12 grossly intact. Gait is normal.  No issues with balance. Sensation intact in hands and feet.     Musculoskeletal:  Good strength and ROM in all extremities.  Strong dorsiflexion at ankles and great toes (5/5) bilaterally without any pain at the Achilles.    Labs:   Results for orders placed or performed in visit on 10/03/19 (from the past 24 hour(s))   Heparin 10a Level   Result Value Ref Range    Heparin 10A Level 0.67 IU/mL   CBC with platelets differential   Result Value Ref Range    WBC 4.3 4.0 - 11.0 10e9/L    RBC Count 4.58 4.4 - 5.9 10e12/L    Hemoglobin 11.8 (L) 13.3 - 17.7 g/dL    Hematocrit 38.9 (L) 40.0 - 53.0 %    MCV 85 78 - 100 fl    MCH 25.8 (L) 26.5 - 33.0 pg    MCHC 30.3 (L) 31.5 - 36.5 g/dL    RDW 14.8 10.0 - 15.0 %    Platelet Count 192 150 - 450 10e9/L    Diff Method Automated Method     % Neutrophils 62.7 %    % Lymphocytes 26.2 %    % Monocytes 6.3 %    % Eosinophils 0.0 %    % Basophils 0.2 %    % Immature Granulocytes 4.6 %    Nucleated RBCs 0 0 /100    Absolute Neutrophil 2.7 1.6 - 8.3 10e9/L    Absolute Lymphocytes 1.1 0.8 - 5.3 10e9/L    Absolute Monocytes 0.3 0.0 - 1.3 10e9/L    Absolute Eosinophils 0.0 0.0 - 0.7 10e9/L    Absolute Basophils 0.0 0.0 - 0.2 10e9/L    Abs Immature Granulocytes 0.2 0 - 0.4 10e9/L    Absolute Nucleated RBC 0.0      Radiology:  Recent Results (from the past 24 hour(s))   X-ray Chest 2 vws*    Narrative    Exam: XR CHEST 2 VW  10/3/2019 12:29 PM      History: evaluate pleural effusion; T lymphoblastic lymphoma (H)    Comparison: CT from 9/27/2019. Chest radiographs from 9/6/2019 and  8/30/2019    Findings: There is a small left pleural effusion which is slightly  larger in size compared to radiograph from 9/6/2018. There is adjacent  ill-defined attenuation, likely atelectasis. Cardiac silhouette is  normal size. Mediastinal enlargement is less pronounced compared to  the prior exams with continued mild prominence in the  right  paratracheal region. Some fullness in the mediastinum correlates with  the thymus. No acute osseous abnormality.      Impression    Impression:   1. Small left pleural effusion with associated atelectasis. Pleural  fluid has increased from 9/6/2019, correlating with recent CT, but the  pleural effusion is smaller than on the 8/30/219 exam.  2. Decrease in mediastinal enlargement compared to the prior  radiographs.     SHENA ORTIZ MD       Assessment:  Lazaro Lund is a 19 year old young man with newly diagnosed T Cell lymphoblastic lymphoma (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  He is being treated per COG protocol [id number] and just completed Induction therapy.  He's had a CRu, resolution of lymphadenopathy however a small pleural effusion persists.  His Day 29 LP was delayed.  His course has been complicated by extensive bilateral DVT, on lovenox for anticoagulation. He has persistent right facial and upper extremity edema. His therapy is being modified to include dexamethasone (instead of prednisone) which is now considered standard of care. Folliculitis improving on keflex.  Pharyngeal candidiasis resolved.  Anti-Xa level looks good.  His Vit D level shows deficiency.     Plan:   1) Continue lovenox at current dose, level in goal range of 0.6-1.  Reminded Lazaro that lovenox will need to be held the night prior to and morning of sedated LPs.  Will maintain platelet count > 30K while on anticoagulation. Plan to repeat U/S in 2 weeks.  Will also obtain Factor V leiden and prothrombin studies next week.  I am concerned about risk for post-phlebitic syndrome for him given his persistent edema.  Will look into options of PT or compression to see if either could potentially provide benefit.  2) Start Vit D 3 2000IU daily, recheck level in 2 months.  3) Day 29 LP deferred, will make up on Day 29 of Consolidation.   4) Given significant spinal headache will plan for IV caffeine following LPs in the future.   5)  Cardiology follow up next month given presenting cardiac tamponade and pericardial effusion along with recent abnormal EKG.  6) Plan to repeat CT at the end of Consolidation, if pleural effusion persists at that time may need to consider tapping it to see if malignant fluid persists.  7) Continue keflex and fluconazole, may discharge fluconazole next week.  8) influenza vaccine today (given in leg to avoid further trauma to arms).  9) May need to consider femoral port, will await U/S in 2 weeks and futher formulate plan.  10) RTC in 1 week to start Consolidation therapy.  Reviewed s/sx to monitor for and asked him to call if he notes increased swelling in his face or right arm.    Addendum:  [facility] will evaluate and treat (PT/OT) patients with edema post blood clot, will refer Lazaro for evaluation.

## 2019-10-03 NOTE — LETTER
10/3/2019      RE: Lazaro Lund  [address] [address]       [facility]   PEDIATRIC HEMATOLOGY ONCOLOGY SOCIAL WORK  INITIAL PSYCHOSOCIAL ASSESSMENT    Assessment completed of living situation, support system, financial status, functional status, coping, stressors, need for resources and social work intervention provided as needed.    Date of Assessment: September/October 2019    Present at assessment: Patient, H/O SW    Diagnosis: T-Cell Lymphoblastic Lymphoma     Date of Diagnosis: August 2019    Physician: Dr. Reynaldo Campuzano    Nurse Practitioner: Luana Van NP    Fellow: Dr. Nicho Fischer    Permanent Address: [address] [place] [id number]. Lazaro noted that he moved in with his mother, Carmen and maternal grandmother, Angelica in [place] shortly after his first discharge from the hospital. He finds this to be more quiet and peaceful than at his Dad's where there are young children.     Phone/Email: Lazaro- [phone]; Carmen (Mom)- [phone]; Jean-Pierre (Dad) - [phone]    Presenting Information: Per H&P and Provider Progress Note: Lazaro Lund is a 19 year old young man with newly diagnosed T-cell lymphoblastic lymphoma. Lazaro presented with acute onset cough and was found to have an anterior mediastinal mass and malignant left-sided pleural effusion.  A CT guided biopsy was obtained at [facility] and pathology was consistent with T-cell leukemia vs lymphoma.  He was admitted to [facility] oncology service 8/30 and started on treatment per [id number].      Decision Making: Self- Lazaro is his own decision maker.     Health Care Directive: Provided education and a copy of [employer] Cho[employer] Health Care Directive on 10/3/19. Lazaro will plan on completing this and bringing it to his next visit.     Family/Support System: Parents, Friends, Grandparents. Support system is adequate.  Dad: Jean-Pierre (lives in [place])  Mom: Carmen  (lives in [place])   Maternal Grandmother: Angelica Richardson ([place], [place])     Caregiver: No caregiver concerns identified. Mom and Maternal Grandma are available to provide care for Lazaro through his therapy as needed.     Transportation: Private Car. No transportation concerns. Provided parking pass (monthly). Education re: parking passes available. Discussion re: mileage and parking reimbursement that may be available through his Medical Assistance, once approved.     Insurance: Underinsured/limits on insurance. Lazaro had a plan through the [employer] however coverage is limited. He applied for Medical Assistance through the online [employer] portal however completed the wrong application. Financial counseling met with him at a previous appointment and helped him complete a paper application which was submitted. His Medical Assistance is currently pending. Requested that coverage be backdated 90 days.     Sources of Income/Employment: No source of income. He previously worked at a [employer] in [place] however has quit this due to his diagnosis and the intensity of his therapy. He applied for Social Security Income and had a phone interview on Monday, September 23rd. He is awaiting their determination. He will likely qualify based on his diagnosis and income.     Financial Concerns: Lazaro's biggest financial stressors at present are parking and gas. He is living with his mother and is not paying rent. SW discussed that there are oncology financial grants available to help offset some of the financial hardship associated with an oncology diagnosis. He is aware we can utilize these as needed in the future.     Education/School: Lazaro graduated from [employer]. He started working right after he graduated high school and has not yet pursued post secondary education. He is thinking about this as an option in the future.     Mental Health: Lazaro denied any h/o mental health concern and has no formal  mental health diagnosis. He endorsed having some social anxiety when in school. He feels this is very situational though.     Chemical Use: No issues identified.    Legal Concerns/Involvement: None.     /Social Service Providers: None.     Child Protection Services Involvement/History: None.     Trauma/Loss/Abuse History: No identified issues.     Current Coping Strategies: Lazaro is approachable, responsive and interactive. He appears to be coping well with his treatment thus far and has an appropriate understanding of his treatment.     Assessment and Recommendations for Team: Lazaro is a pleasant 19 year old young man with newly diagnosed lymphoma. He is currently living with his mother and grandmother in [place]. He has applied for Medical Assistance and Social Security. He has encountered some complications related to his PICC line but notes otherwise having minimal chemotherapy side effects. He is a bit quiet when initially meeting someone new however becomes more talkative and engaged as he gets to know people. He shared he feels well supported by his parents. He appears quite responsible and engaged in his care/treatment. He verbalized understanding of SW role and how we can support his mental health, emotional health and help connect him with resources. He is open to and appreciative of ongoing therapeutic support, advocacy, and connection with resources.     Interventions:   1. Introduction of H/O SW, role and contact information provided.   2. Psychosocial Assessment.   3. Assessment of coping/adjustment to new diagnosis.   4. Education re: oncology allyson organizations available.   5. Discussion re: Medical Assistance application.   6. Parking pass and gas cards.   7. Education regarding Health Care Directive. HCD provided.   8. Release of Information (Authorization to discuss PHI with parents), signed. Will fax to HIM for scanning.     Follow-Up Planned: Initiate financial resources.  Psychosocial support. Social work will continue to assess needs and provide ongoing psychosocial support and access to resources.        CHEY Davis, DOROTA, OSW-C  Clinical    Pediatric Hematology Oncology   [facility]   Monday-Thursday   Phone: [phone]  Pager: [phone]    NO LETTER              CHEY Alcantara

## 2019-10-03 NOTE — PROGRESS NOTES
[facility]   PEDIATRIC HEMATOLOGY ONCOLOGY SOCIAL WORK  INITIAL PSYCHOSOCIAL ASSESSMENT    Assessment completed of living situation, support system, financial status, functional status, coping, stressors, need for resources and social work intervention provided as needed.    Date of Assessment: September/October 2019    Present at assessment: Patient, H/O SW    Diagnosis: T-Cell Lymphoblastic Lymphoma     Date of Diagnosis: August 2019    Physician: Dr. Reynaldo Campuzano    Nurse Practitioner: Luana Van NP    Fellow: Dr. Nicho Fischer    Permanent Address: [address] [place] [address]. Lazaro noted that he moved in with his mother, Carmen and maternal grandmother, Angelica in [place] shortly after his first discharge from the hospital. He finds this to be more quiet and peaceful than at his Dad's where there are young children.     Phone/Email: Lazaro- [phone]; Carmen (Mom)- [phone]; Jean-Pierre (Dad) - [phone]    Presenting Information: Per H&P and Provider Progress Note: Lazaro Lund is a 19 year old young man with newly diagnosed T-cell lymphoblastic lymphoma. Lazaro presented with acute onset cough and was found to have an anterior mediastinal mass and malignant left-sided pleural effusion.  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  He was admitted to [facility] oncology service 8/30 and started on treatment per [id number].      Decision Making: Self- Lazaro is his own decision maker.     Health Care Directive: Provided education and a copy of Honoring Choices Health Care Directive on 10/3/19. Lazaro will plan on completing this and bringing it to his next visit.     Family/Support System: Parents, Friends, Grandparents. Support system is adequate.  Dad: Jean-Pierre (lives in [place])  Mom: Carmen (lives in [place])   Maternal Grandmother: Angelica Richardson ([place], [place])     Caregiver: No  caregiver concerns identified. Mom and Maternal Grandma are available to provide care for Lazaro through his therapy as needed.     Transportation: Private Car. No transportation concerns. Provided parking pass (monthly). Education re: parking passes available. Discussion re: mileage and parking reimbursement that may be available through his Medical Assistance, once approved.     Insurance: Underinsured/limits on insurance. Lazaro had a plan through the [employer] however coverage is limited. He applied for Medical Assistance through the online [employer] portal however completed the wrong application. Financial counseling met with him at a previous appointment and helped him complete a paper application which was submitted. His Medical Assistance is currently pending. Requested that coverage be backdated 90 days.     Sources of Income/Employment: No source of income. He previously worked at a [employer] in [place] however has quit this due to his diagnosis and the intensity of his therapy. He applied for Social Security Income and had a phone interview on Monday, September 23rd. He is awaiting their determination. He will likely qualify based on his diagnosis and income.     Financial Concerns: Lazaro's biggest financial stressors at present are parking and gas. He is living with his mother and is not paying rent. SW discussed that there are oncology financial grants available to help offset some of the financial hardship associated with an oncology diagnosis. He is aware we can utilize these as needed in the future.     Education/School: Lazaro graduated from [employer]. He started working right after he graduated high school and has not yet pursued post secondary education. He is thinking about this as an option in the future.     Mental Health: Lazaro denied any h/o mental health concern and has no formal mental health diagnosis. He endorsed having some social anxiety when in school. He feels this is  very situational though.     Chemical Use: No issues identified.    Legal Concerns/Involvement: None.     /Social Service Providers: None.     Child Protection Services Involvement/History: None.     Trauma/Loss/Abuse History: No identified issues.     Current Coping Strategies: Lazaro is approachable, responsive and interactive. He appears to be coping well with his treatment thus far and has an appropriate understanding of his treatment.     Assessment and Recommendations for Team: Lazaro is a pleasant 19 year old young man with newly diagnosed lymphoma. He is currently living with his mother and grandmother in [place]. He has applied for Medical Assistance and Social Security. He has encountered some complications related to his PICC line but notes otherwise having minimal chemotherapy side effects. He is a bit quiet when initially meeting someone new however becomes more talkative and engaged as he gets to know people. He shared he feels well supported by his parents. He appears quite responsible and engaged in his care/treatment. He verbalized understanding of SW role and how we can support his mental health, emotional health and help connect him with resources. He is open to and appreciative of ongoing therapeutic support, advocacy, and connection with resources.     Interventions:   1. Introduction of H/O SW, role and contact information provided.   2. Psychosocial Assessment.   3. Assessment of coping/adjustment to new diagnosis.   4. Education re: oncology allyson organizations available.   5. Discussion re: Medical Assistance application.   6. Parking pass and gas cards.   7. Education regarding Health Care Directive. HCD provided.   8. Release of Information (Authorization to discuss PHI with parents), signed. Will fax to HIM for scanning.     Follow-Up Planned: Initiate financial resources. Psychosocial support. Social work will continue to assess needs and provide ongoing psychosocial support  and access to resources.        CHEY Davis, DOROTA, OSW-C  Clinical    Pediatric Hematology Oncology   [facility]   Monday-Thursday   Phone: [phone]  Pager: [phone]    NO LETTER

## 2019-10-03 NOTE — LETTER
10/3/2019      RE: Lazaro Lund  [address] [facility] [id number]       Pediatric Hematology/Oncology Clinic Note    Lazaro Lund is a 19 year old young man with newly diagnosed T-cell lymphoblastic lymphoma. Lazaro presented with acute onset cough and was found to have an anterior mediastinal mass and malignant left-sided pleural effusion.  A CT guided biopsy was obtained at [facility] and pathology was consistent with T-cell leukemia vs lymphoma.  He was admitted to [facility] oncology service 8/30 and started on treatment per [id number].  He had a pleural effusion that required a chest tube and a pericardial effusion that was not drained. His Induction was complicated by cardiac compression secondary to his mass leading to tamponade, this improved through out his hospital stay.  He also had dysphagia that improved with treatment of his mass, swallow study was normal. His course was recently complicated by extensive bilateral UE DVTs for which anticoagulation was started during hospitalization last week. His Day 29 LP was cancelled due to folliculitis overlying his lumbar spine.  Lazaro comes to clinic today for labs, xray and a visit.    HPI:   Since Lazaro's last visit he has been stable.  He notes his facial swelling is slowly improving.  His arm seems overall unchanged to him.   He notes some irritation at the insertion site but no pain.  It is not draining much anymore.  His lovenox injections are going well. Denies SOB. No new skin issues, he feels his folliculitis is unchanged.    Lazaro denies blurry vision.  His appetite is back to baseline.  He is ambulating without difficulty but had a hard time trying to jog recently.  No paresthesias.      Lazaro denies constipation, using senna PRN.  No fever.  He is sleeping well at night, trying to elevate his arm when he sleeps.    Review of systems:  Remainder of ROS is complete and negative.    PMH:   Past Medical History:   Diagnosis Date     DVT  of upper extremity (deep vein thrombosis) (H) 09/26/2019    Bilateral      Migraine 2006    have resolved     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   Spinal HA following LP  TPMT shows Intermediate activity    PFMH:   Family History   Problem Relation Age of Onset     No Known Problems Mother      No Known Problems Father      Asthma Brother      Thyroid Cancer Paternal Grandmother      Melanoma Paternal Aunt        Social History: Lazaro previously lived at home with his dad and step mom in [place] but is now staying with his mom in [place].  He was working full time at [employer] in [place] prior to his diagnosis.    Current Medications:  Current Outpatient Medications   Medication Sig Dispense Refill     cephALEXin (KEFLEX) 500 MG capsule Take 1 capsule (500 mg) by mouth 2 times daily for 7 days 14 capsule 0     diphenhydrAMINE (BENADRYL) 25 MG capsule Take 1-2 capsules (25-50 mg) by mouth every 6 hours as needed (Breakthrough Nausea and Vomiting ) 30 capsule 1     enoxaparin (LOVENOX) 60 MG/0.6ML syringe Inject 0.6 mLs (60 mg) Subcutaneous every 12 hours for 30 doses 18 mL 1     fluconazole (DIFLUCAN) 200 MG tablet Take 2 tablets (400 mg) by mouth daily for 14 days 28 tablet 0     melatonin 3 MG tablet Take 1 tablet (3 mg) by mouth At Bedtime 30 tablet 1     ondansetron (ZOFRAN) 8 MG tablet Take 1 tablet (8 mg) by mouth every 6 hours as needed for nausea 30 tablet 1     oxyCODONE (ROXICODONE) 5 MG tablet Take 1 tablet (5 mg) by mouth every 6 hours as needed for moderate to severe pain 10 tablet 0     pantoprazole (PROTONIX) 40 MG EC tablet Take 1 tablet (40 mg) by mouth daily . This acid blocker helps prevent stomach pain while on your decadron chemotherapy. 30 tablet 0     polyethylene glycol (MIRALAX/GLYCOLAX) packet Take 17 g by mouth daily 30 packet 0     sennosides (SENOKOT) 8.6 MG tablet Take 2 tablets by mouth 2 times daily as needed for constipation 60 each 1     sulfamethoxazole-trimethoprim  (BACTRIM DS/SEPTRA DS) 800-160 MG tablet Take 1 tablet by mouth Every Mon, Tues two times daily 16 tablet 0   Above medications reviewed. Taking senna PRN.    Physical Exam:   Temp:  [99.3  F (37.4  C)] 99.3  F (37.4  C)  Pulse:  [99] 99  Resp:  [18] 18  BP: (112)/(67) 112/67  SpO2:  [100 %] 100 %  Wt Readings from Last 4 Encounters:   10/03/19 76.4 kg (168 lb 6.9 oz)   10/01/19 76.4 kg (168 lb 6.9 oz)   09/26/19 77.6 kg (171 lb 1.2 oz)   09/26/19 77.9 kg (171 lb 11.8 oz)   Weight at diagnosis was 75.2kg, Lazaro considers his baseline weight to be 173-175lbs before he initially got sick  General: Lazaro is alert, interactive and appropriate. NAD. He is cushingoid.   HEENT: Skull is atrauamatic and normocephalic.  The right side of his face is noticeably full, no venous distention.  Full head of hair. PERRLA, sclera are non icteric and not injected, EOM are intact. Nares are patent without drainage. Oropharynx clear, no plaques noted. Buccal mucosa and tongue clear. MMM. Tympanic membranes are opaque bilaterally with light reflex and landmarks present.  Lymph:  Neck is supple without lymphadenopathy.  There is no supraclavicular, axillary or inguinal lymphadenopathy palpated.  Cardiovascular:  HR is regular, S1, S2 no murmur.  Capillary refill is < 2 seconds. Right arm with edema most concentrated at the elbow but present throughout, is 1+ pitting near the elbow . PICC insertion site without warmth, erythema or drainage.  Cap refill < 2 sec. Peripheral pulses 2+/=.   Measurements of arms as follows:   Right arm at AC: 31cm (down from 31cm)  Right bicep (10cm from AC): decreased at  at 30.5cm  Right wrist 18.5cm  Respiratory: Loose cough noted. Respirations are easy.  Lungs are clear to auscultation through out.  No crackles or wheezes.  Gastrointestinal:  BS present in all quadrants.  Abdomen is soft and non-tender.  No hepatosplenomegaly or masses are palpated.  Skin: Scattered petechiae on right arm. Diffuse  truncal and upper extremity papular non-blanching erythematous rash, few pustules noted.  No vesicular lesions.   Neurological:  CN 2-12 grossly intact. Gait is normal.  No issues with balance. Sensation intact in hands and feet.     Musculoskeletal:  Good strength and ROM in all extremities.  Strong dorsiflexion at ankles and great toes (5/5) bilaterally without any pain at the Achilles.    Labs:   Results for orders placed or performed in visit on 10/03/19 (from the past 24 hour(s))   Heparin 10a Level   Result Value Ref Range    Heparin 10A Level 0.67 IU/mL   CBC with platelets differential   Result Value Ref Range    WBC 4.3 4.0 - 11.0 10e9/L    RBC Count 4.58 4.4 - 5.9 10e12/L    Hemoglobin 11.8 (L) 13.3 - 17.7 g/dL    Hematocrit 38.9 (L) 40.0 - 53.0 %    MCV 85 78 - 100 fl    MCH 25.8 (L) 26.5 - 33.0 pg    MCHC 30.3 (L) 31.5 - 36.5 g/dL    RDW 14.8 10.0 - 15.0 %    Platelet Count 192 150 - 450 10e9/L    Diff Method Automated Method     % Neutrophils 62.7 %    % Lymphocytes 26.2 %    % Monocytes 6.3 %    % Eosinophils 0.0 %    % Basophils 0.2 %    % Immature Granulocytes 4.6 %    Nucleated RBCs 0 0 /100    Absolute Neutrophil 2.7 1.6 - 8.3 10e9/L    Absolute Lymphocytes 1.1 0.8 - 5.3 10e9/L    Absolute Monocytes 0.3 0.0 - 1.3 10e9/L    Absolute Eosinophils 0.0 0.0 - 0.7 10e9/L    Absolute Basophils 0.0 0.0 - 0.2 10e9/L    Abs Immature Granulocytes 0.2 0 - 0.4 10e9/L    Absolute Nucleated RBC 0.0      Radiology:  Recent Results (from the past 24 hour(s))   X-ray Chest 2 vws*    Narrative    Exam: XR CHEST 2 VW  10/3/2019 12:29 PM      History: evaluate pleural effusion; T lymphoblastic lymphoma (H)    Comparison: CT from 9/27/2019. Chest radiographs from 9/6/2019 and  8/30/2019    Findings: There is a small left pleural effusion which is slightly  larger in size compared to radiograph from 9/6/2018. There is adjacent  ill-defined attenuation, likely atelectasis. Cardiac silhouette is  normal size. Mediastinal  enlargement is less pronounced compared to  the prior exams with continued mild prominence in the right  paratracheal region. Some fullness in the mediastinum correlates with  the thymus. No acute osseous abnormality.      Impression    Impression:   1. Small left pleural effusion with associated atelectasis. Pleural  fluid has increased from 9/6/2019, correlating with recent CT, but the  pleural effusion is smaller than on the 8/30/219 exam.  2. Decrease in mediastinal enlargement compared to the prior  radiographs.     SHENA ORTIZ MD       Assessment:  Lazaro Lund is a 19 year old young man with newly diagnosed T Cell lymphoblastic lymphoma (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  He is being treated per COG protocol [id number] and just completed Induction therapy.  He's had a CRu, resolution of lymphadenopathy however a small pleural effusion persists.  His Day 29 LP was delayed.  His course has been complicated by extensive bilateral DVT, on lovenox for anticoagulation. He has persistent right facial and upper extremity edema. His therapy is being modified to include dexamethasone (instead of prednisone) which is now considered standard of care. Folliculitis improving on keflex.  Pharyngeal candidiasis resolved.  Anti-Xa level looks good.  His Vit D level shows deficiency.     Plan:   1) Continue lovenox at current dose, level in goal range of 0.6-1.  Reminded Lazaro that lovenox will need to be held the night prior to and morning of sedated LPs.  Will maintain platelet count > 30K while on anticoagulation. Plan to repeat U/S in 2 weeks.  Will also obtain Factor V leiden and prothrombin studies next week.  I am concerned about risk for post-phlebitic syndrome for him given his persistent edema.  Will look into options of PT or compression to see if either could potentially provide benefit.  2) Start Vit D 3 2000IU daily, recheck level in 2 months.  3) Day 29 LP deferred, will make up on Day 29 of Consolidation.   4)  Given significant spinal headache will plan for IV caffeine following LPs in the future.   5) Cardiology follow up next month given presenting cardiac tamponade and pericardial effusion along with recent abnormal EKG.  6) Plan to repeat CT at the end of Consolidation, if pleural effusion persists at that time may need to consider tapping it to see if malignant fluid persists.  7) Continue keflex and fluconazole, may discharge fluconazole next week.  8) influenza vaccine today (given in leg to avoid further trauma to arms).  9) May need to consider femoral port, will await U/S in 2 weeks and futher formulate plan.  10) RTC in 1 week to start Consolidation therapy.  Reviewed s/sx to monitor for and asked him to call if he notes increased swelling in his face or right arm.      Félix Van, YOHAN CNP

## 2019-10-09 ENCOUNTER — ANESTHESIA EVENT (OUTPATIENT)
Dept: PEDIATRICS | Facility: CLINIC | Age: 19
End: 2019-10-09
Payer: COMMERCIAL

## 2019-10-10 ENCOUNTER — OFFICE VISIT (OUTPATIENT)
Dept: PEDIATRIC HEMATOLOGY/ONCOLOGY | Facility: CLINIC | Age: 19
End: 2019-10-10
Attending: NURSE PRACTITIONER
Payer: COMMERCIAL

## 2019-10-10 ENCOUNTER — ANESTHESIA (OUTPATIENT)
Dept: PEDIATRICS | Facility: CLINIC | Age: 19
End: 2019-10-10
Payer: COMMERCIAL

## 2019-10-10 ENCOUNTER — OFFICE VISIT (OUTPATIENT)
Dept: PEDIATRIC HEMATOLOGY/ONCOLOGY | Facility: CLINIC | Age: 19
End: 2019-10-10

## 2019-10-10 ENCOUNTER — HOME INFUSION (PRE-WILLOW HOME INFUSION) (OUTPATIENT)
Dept: PHARMACY | Facility: CLINIC | Age: 19
End: 2019-10-10

## 2019-10-10 ENCOUNTER — INFUSION THERAPY VISIT (OUTPATIENT)
Dept: INFUSION THERAPY | Facility: CLINIC | Age: 19
End: 2019-10-10
Attending: NURSE PRACTITIONER
Payer: COMMERCIAL

## 2019-10-10 ENCOUNTER — HOSPITAL ENCOUNTER (OUTPATIENT)
Facility: CLINIC | Age: 19
Discharge: HOME | End: 2019-10-10
Attending: PEDIATRICS | Admitting: PEDIATRICS
Payer: COMMERCIAL

## 2019-10-10 VITALS
HEART RATE: 66 BPM | DIASTOLIC BLOOD PRESSURE: 73 MMHG | RESPIRATION RATE: 30 BRPM | HEIGHT: 73 IN | SYSTOLIC BLOOD PRESSURE: 112 MMHG | WEIGHT: 164.9 LBS | BODY MASS INDEX: 21.86 KG/M2 | TEMPERATURE: 97.9 F | OXYGEN SATURATION: 98 %

## 2019-10-10 VITALS
RESPIRATION RATE: 20 BRPM | TEMPERATURE: 98.1 F | OXYGEN SATURATION: 98 % | DIASTOLIC BLOOD PRESSURE: 49 MMHG | SYSTOLIC BLOOD PRESSURE: 107 MMHG

## 2019-10-10 DIAGNOSIS — I31.39 PERICARDIAL EFFUSION: Primary | ICD-10-CM

## 2019-10-10 DIAGNOSIS — Z71.9 ENCOUNTER FOR COUNSELING: Primary | ICD-10-CM

## 2019-10-10 DIAGNOSIS — C83.50 T LYMPHOBLASTIC LYMPHOMA (H): Primary | ICD-10-CM

## 2019-10-10 LAB
ALBUMIN SERPL-MCNC: 2.9 G/DL (ref 3.4–5)
ALP SERPL-CCNC: 87 U/L (ref 40–150)
ALT SERPL W P-5'-P-CCNC: 89 U/L (ref 0–70)
ANION GAP SERPL CALCULATED.3IONS-SCNC: 4 MMOL/L (ref 3–14)
AST SERPL W P-5'-P-CCNC: 29 U/L (ref 0–45)
BASOPHILS # BLD AUTO: 0 10E9/L (ref 0–0.2)
BASOPHILS NFR BLD AUTO: 0.6 %
BILIRUB SERPL-MCNC: 0.2 MG/DL (ref 0.2–1.3)
BUN SERPL-MCNC: 15 MG/DL (ref 7–30)
CALCIUM SERPL-MCNC: 8.5 MG/DL (ref 8.5–10.1)
CHLORIDE SERPL-SCNC: 108 MMOL/L (ref 94–109)
CO2 SERPL-SCNC: 30 MMOL/L (ref 20–32)
CREAT SERPL-MCNC: 0.54 MG/DL (ref 0.66–1.25)
DIFFERENTIAL METHOD BLD: ABNORMAL
EOSINOPHIL # BLD AUTO: 0 10E9/L (ref 0–0.7)
EOSINOPHIL NFR BLD AUTO: 0.6 %
ERYTHROCYTE [DISTWIDTH] IN BLOOD BY AUTOMATED COUNT: 15.7 % (ref 10–15)
GFR SERPL CREATININE-BSD FRML MDRD: >90 ML/MIN/{1.73_M2}
GLUCOSE SERPL-MCNC: 87 MG/DL (ref 70–99)
HCT VFR BLD AUTO: 34.2 % (ref 40–53)
HGB BLD-MCNC: 10.6 G/DL (ref 13.3–17.7)
IMM GRANULOCYTES # BLD: 0.1 10E9/L (ref 0–0.4)
IMM GRANULOCYTES NFR BLD: 2.5 %
LYMPHOCYTES # BLD AUTO: 1.4 10E9/L (ref 0.8–5.3)
LYMPHOCYTES NFR BLD AUTO: 39.3 %
MCH RBC QN AUTO: 26.2 PG (ref 26.5–33)
MCHC RBC AUTO-ENTMCNC: 31 G/DL (ref 31.5–36.5)
MCV RBC AUTO: 84 FL (ref 78–100)
MONOCYTES # BLD AUTO: 0.5 10E9/L (ref 0–1.3)
MONOCYTES NFR BLD AUTO: 12.5 %
NEUTROPHILS # BLD AUTO: 1.6 10E9/L (ref 1.6–8.3)
NEUTROPHILS NFR BLD AUTO: 44.5 %
NRBC # BLD AUTO: 0 10*3/UL
NRBC BLD AUTO-RTO: 0 /100
PLATELET # BLD AUTO: 333 10E9/L (ref 150–450)
POTASSIUM SERPL-SCNC: 4.1 MMOL/L (ref 3.4–5.3)
PROT SERPL-MCNC: 6 G/DL (ref 6.8–8.8)
RBC # BLD AUTO: 4.05 10E12/L (ref 4.4–5.9)
SODIUM SERPL-SCNC: 142 MMOL/L (ref 133–144)
WBC # BLD AUTO: 3.6 10E9/L (ref 4–11)

## 2019-10-10 PROCEDURE — 96365 THER/PROPH/DIAG IV INF INIT: CPT | Mod: 59 | Performed by: PEDIATRICS

## 2019-10-10 PROCEDURE — 96413 CHEMO IV INFUSION 1 HR: CPT

## 2019-10-10 PROCEDURE — 25800030 ZZH RX IP 258 OP 636: Mod: ZF

## 2019-10-10 PROCEDURE — 25800030 ZZH RX IP 258 OP 636: Mod: ZF | Performed by: PEDIATRICS

## 2019-10-10 PROCEDURE — 89050 BODY FLUID CELL COUNT: CPT | Performed by: PEDIATRICS

## 2019-10-10 PROCEDURE — 25800030 ZZH RX IP 258 OP 636: Performed by: NURSE ANESTHETIST, CERTIFIED REGISTERED

## 2019-10-10 PROCEDURE — 25000125 ZZHC RX 250: Performed by: PEDIATRICS

## 2019-10-10 PROCEDURE — 25000125 ZZHC RX 250

## 2019-10-10 PROCEDURE — 85025 COMPLETE CBC W/AUTO DIFF WBC: CPT | Performed by: PEDIATRICS

## 2019-10-10 PROCEDURE — 25000128 H RX IP 250 OP 636: Mod: ZF | Performed by: PEDIATRICS

## 2019-10-10 PROCEDURE — 25000128 H RX IP 250 OP 636: Mod: JW | Performed by: PEDIATRICS

## 2019-10-10 PROCEDURE — 81240 F2 GENE: CPT | Performed by: NURSE PRACTITIONER

## 2019-10-10 PROCEDURE — 96375 TX/PRO/DX INJ NEW DRUG ADDON: CPT | Mod: 59

## 2019-10-10 PROCEDURE — 80053 COMPREHEN METABOLIC PANEL: CPT | Performed by: PEDIATRICS

## 2019-10-10 PROCEDURE — 40001011 ZZH STATISTIC PRE-PROCEDURE NURSING ASSESSMENT: Performed by: PEDIATRICS

## 2019-10-10 PROCEDURE — 37000008 ZZH ANESTHESIA TECHNICAL FEE, 1ST 30 MIN: Performed by: PEDIATRICS

## 2019-10-10 PROCEDURE — 25000128 H RX IP 250 OP 636: Mod: ZF

## 2019-10-10 PROCEDURE — 96411 CHEMO IV PUSH ADDL DRUG: CPT

## 2019-10-10 PROCEDURE — 25800030 ZZH RX IP 258 OP 636: Performed by: PEDIATRICS

## 2019-10-10 PROCEDURE — 40000165 ZZH STATISTIC POST-PROCEDURE RECOVERY CARE: Performed by: PEDIATRICS

## 2019-10-10 PROCEDURE — 25000128 H RX IP 250 OP 636: Performed by: NURSE ANESTHETIST, CERTIFIED REGISTERED

## 2019-10-10 PROCEDURE — 96450 CHEMOTHERAPY INTO CNS: CPT | Performed by: PEDIATRICS

## 2019-10-10 PROCEDURE — 36592 COLLECT BLOOD FROM PICC: CPT | Performed by: PEDIATRICS

## 2019-10-10 PROCEDURE — 81241 F5 GENE: CPT | Performed by: NURSE PRACTITIONER

## 2019-10-10 RX ORDER — SCOLOPAMINE TRANSDERMAL SYSTEM 1 MG/1
1 PATCH, EXTENDED RELEASE TRANSDERMAL
Qty: 10 PATCH | Refills: 0 | Status: ON HOLD | OUTPATIENT
Start: 2019-10-10 | End: 2019-10-31

## 2019-10-10 RX ORDER — MERCAPTOPURINE 50 MG/1
TABLET ORAL
Qty: 33 TABLET | Refills: 0 | Status: ON HOLD | OUTPATIENT
Start: 2019-10-10 | End: 2019-10-31

## 2019-10-10 RX ORDER — CYTARABINE 20 MG/ML
150 INJECTION, SOLUTION INTRATHECAL; INTRAVENOUS; SUBCUTANEOUS ONCE
Status: COMPLETED | OUTPATIENT
Start: 2019-10-10 | End: 2019-10-10

## 2019-10-10 RX ORDER — ONDANSETRON 2 MG/ML
INJECTION INTRAMUSCULAR; INTRAVENOUS PRN
Status: DISCONTINUED | OUTPATIENT
Start: 2019-10-10 | End: 2019-10-10

## 2019-10-10 RX ORDER — ONDANSETRON 2 MG/ML
8 INJECTION INTRAMUSCULAR; INTRAVENOUS ONCE
Status: COMPLETED | OUTPATIENT
Start: 2019-10-10 | End: 2019-10-10

## 2019-10-10 RX ORDER — PROPOFOL 10 MG/ML
INJECTION, EMULSION INTRAVENOUS CONTINUOUS PRN
Status: DISCONTINUED | OUTPATIENT
Start: 2019-10-10 | End: 2019-10-10

## 2019-10-10 RX ORDER — ONDANSETRON 2 MG/ML
INJECTION INTRAMUSCULAR; INTRAVENOUS
Status: COMPLETED
Start: 2019-10-10 | End: 2019-10-10

## 2019-10-10 RX ORDER — CYTARABINE 100 MG/ML
75 INJECTION, SOLUTION INTRATHECAL; INTRAVENOUS; SUBCUTANEOUS DAILY
Qty: 4.5 ML | Refills: 0 | Status: ON HOLD | OUTPATIENT
Start: 2019-10-11 | End: 2019-10-31

## 2019-10-10 RX ORDER — SODIUM CHLORIDE 9 MG/ML
INJECTION, SOLUTION INTRAVENOUS CONTINUOUS
Status: ACTIVE | OUTPATIENT
Start: 2019-10-10 | End: 2019-10-10

## 2019-10-10 RX ORDER — LIDOCAINE 40 MG/G
CREAM TOPICAL
Status: COMPLETED
Start: 2019-10-10 | End: 2019-10-10

## 2019-10-10 RX ORDER — HEPARIN SODIUM,PORCINE 10 UNIT/ML
5 VIAL (ML) INTRAVENOUS ONCE
Status: CANCELLED | OUTPATIENT
Start: 2019-10-10 | End: 2019-10-10

## 2019-10-10 RX ORDER — ONDANSETRON 4 MG/1
8 TABLET, FILM COATED ORAL EVERY 6 HOURS PRN
Qty: 30 TABLET | Refills: 1 | Status: ON HOLD | OUTPATIENT
Start: 2019-10-10 | End: 2019-11-08

## 2019-10-10 RX ORDER — PROPOFOL 10 MG/ML
INJECTION, EMULSION INTRAVENOUS PRN
Status: DISCONTINUED | OUTPATIENT
Start: 2019-10-10 | End: 2019-10-10

## 2019-10-10 RX ORDER — SODIUM CHLORIDE 9 MG/ML
INJECTION, SOLUTION INTRAVENOUS
Status: COMPLETED
Start: 2019-10-10 | End: 2019-10-10

## 2019-10-10 RX ORDER — SCOLOPAMINE TRANSDERMAL SYSTEM 1 MG/1
1 PATCH, EXTENDED RELEASE TRANSDERMAL ONCE
Status: COMPLETED | OUTPATIENT
Start: 2019-10-10 | End: 2019-10-10

## 2019-10-10 RX ORDER — SCOLOPAMINE TRANSDERMAL SYSTEM 1 MG/1
PATCH, EXTENDED RELEASE TRANSDERMAL
Status: COMPLETED
Start: 2019-10-10 | End: 2019-10-10

## 2019-10-10 RX ORDER — SODIUM CHLORIDE, SODIUM LACTATE, POTASSIUM CHLORIDE, CALCIUM CHLORIDE 600; 310; 30; 20 MG/100ML; MG/100ML; MG/100ML; MG/100ML
INJECTION, SOLUTION INTRAVENOUS CONTINUOUS PRN
Status: DISCONTINUED | OUTPATIENT
Start: 2019-10-10 | End: 2019-10-10

## 2019-10-10 RX ORDER — LIDOCAINE 40 MG/G
CREAM TOPICAL
Status: COMPLETED | OUTPATIENT
Start: 2019-10-10 | End: 2019-10-10

## 2019-10-10 RX ADMIN — SODIUM CHLORIDE: 9 INJECTION, SOLUTION INTRAVENOUS at 10:26

## 2019-10-10 RX ADMIN — PROPOFOL 70 MG: 10 INJECTION, EMULSION INTRAVENOUS at 08:50

## 2019-10-10 RX ADMIN — SCOLOPAMINE TRANSDERMAL SYSTEM 1 PATCH: 1 PATCH, EXTENDED RELEASE TRANSDERMAL at 08:22

## 2019-10-10 RX ADMIN — SODIUM CHLORIDE, SODIUM LACTATE, POTASSIUM CHLORIDE, CALCIUM CHLORIDE: 600; 310; 30; 20 INJECTION, SOLUTION INTRAVENOUS at 08:50

## 2019-10-10 RX ADMIN — CYCLOPHOSPHAMIDE 2000 MG: 2 INJECTION, POWDER, FOR SOLUTION INTRAVENOUS; ORAL at 12:36

## 2019-10-10 RX ADMIN — ONDANSETRON 4 MG: 2 INJECTION INTRAMUSCULAR; INTRAVENOUS at 09:02

## 2019-10-10 RX ADMIN — PROPOFOL 30 MG: 10 INJECTION, EMULSION INTRAVENOUS at 08:52

## 2019-10-10 RX ADMIN — LIDOCAINE HYDROCHLORIDE 0.2 ML: 10 INJECTION, SOLUTION EPIDURAL; INFILTRATION; INTRACAUDAL; PERINEURAL at 07:50

## 2019-10-10 RX ADMIN — ONDANSETRON 4 MG: 2 INJECTION INTRAMUSCULAR; INTRAVENOUS at 12:29

## 2019-10-10 RX ADMIN — CYTARABINE 150 MG: 20 INJECTION, SOLUTION INTRATHECAL; INTRAVENOUS; SUBCUTANEOUS at 12:34

## 2019-10-10 RX ADMIN — CAFFEINE AND SODIUM BENZOATE 500 MG: 125 INJECTION, SOLUTION INTRAMUSCULAR; INTRAVENOUS at 08:29

## 2019-10-10 RX ADMIN — SCOPALAMINE 1 PATCH: 1 PATCH, EXTENDED RELEASE TRANSDERMAL at 08:22

## 2019-10-10 RX ADMIN — PROPOFOL 40 MG: 10 INJECTION, EMULSION INTRAVENOUS at 08:54

## 2019-10-10 RX ADMIN — PROPOFOL 40 MG: 10 INJECTION, EMULSION INTRAVENOUS at 08:59

## 2019-10-10 RX ADMIN — LIDOCAINE 1 EACH: 40 CREAM TOPICAL at 08:05

## 2019-10-10 RX ADMIN — PROPOFOL 300 MCG/KG/MIN: 10 INJECTION, EMULSION INTRAVENOUS at 08:50

## 2019-10-10 RX ADMIN — METHOTREXATE: 25 INJECTION INTRA-ARTERIAL; INTRAMUSCULAR; INTRATHECAL; INTRAVENOUS at 09:04

## 2019-10-10 RX ADMIN — SODIUM CHLORIDE: 9 INJECTION, SOLUTION INTRAVENOUS at 13:45

## 2019-10-10 ASSESSMENT — MIFFLIN-ST. JEOR: SCORE: 1801.13

## 2019-10-10 NOTE — LETTER
10/10/2019      RE: Lazaro Lund  [address] [facility] [address]       Pediatric Hematology/Oncology Clinic Note    Lazaro Lund is a 19 year old young man with newly diagnosed T-cell lymphoblastic lymphoma. Lazaro presented with acute onset cough and was found to have an anterior mediastinal mass and malignant left-sided pleural effusion.  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  He was admitted to [facility] oncology service 8/30 and started on treatment per [id number].  He had a pleural effusion that required a chest tube and a pericardial effusion that was not drained. His Induction was complicated by cardiac compression secondary to his mass leading to tamponade, this improved through out his hospital stay.  He also had dysphagia that improved with treatment of his mass, swallow study was normal. His course was recently complicated by extensive bilateral UE DVTs for which anticoagulation was started. His Day 29 LP was cancelled due to folliculitis overlying his lumbar spine.  Lazaro comes to clinic today for day 1 of consolidation.     HPI:   Since Lazaro's last visit he has been feeling well. No new symptoms or concerns. Facial and right arm swelling are stable. His lovenox injections are going well and his last injection was yesterday morning. Denies SOB. No new skin issues, he feels his folliculitis is unchanged.     Lazaro denies blurry vision.  His appetite is back to baseline. No paresthesias. Lazaro denies constipation, using senna PRN.  No fever.  He is sleeping well at night, trying to elevate his arm when he sleeps.    Review of systems:  Remainder of ROS is complete and negative.    PMH:   Past Medical History:   Diagnosis Date     DVT of upper extremity (deep vein thrombosis) (H) 09/26/2019    Bilateral      Migraine 2006    have resolved     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   Spinal HA following LP  TPMT shows  Intermediate activity    PF:   Family History   Problem Relation Age of Onset     No Known Problems Mother      No Known Problems Father      Asthma Brother      Thyroid Cancer Paternal Grandmother      Melanoma Paternal Aunt        Social History: Lazaro previously lived at home with his dad and step mom in [place] but is now staying with his mom in [place].  He was working full time at [employer] in [place] prior to his diagnosis, but has since had to quit his job.    Current Medications:  Current Outpatient Medications   Medication Sig Dispense Refill     scopolamine (TRANSDERM) 1 MG/3DAYS 72 hr patch Place 1 patch onto the skin every 72 hours 10 patch 0     [START ON 10/11/2019] cytarabine, PF, (CYTOSAR) 100 MG/ML injection CHEMO Inject 1.5 mLs (150 mg) Subcutaneous daily for 3 days Start day after clinic dose. 4.5 mL 0     mercaptopurine (PURINETHOL) 50 MG tablet CHEMO Take 2.5 tablets (125mg) five days/week and 2 tablets (100mg) two days/week. Take on Days 1-14. 33 tablet 0     ondansetron (ZOFRAN) 4 MG tablet Take 2 tablets (8 mg) by mouth every 6 hours as needed (nausea / vomiting) 30 tablet 1   Above medications reviewed. Taking senna PRN.  Influenza vaccine 2019/2020: 10/3/19    Physical Exam:   Temp:  [97.4  F (36.3  C)-97.9  F (36.6  C)] 97.9  F (36.6  C)  Pulse:  [66-78] 66  Heart Rate:  [67-74] 71  Resp:  [16-30] 30  BP: ()/(51-74) 112/73  SpO2:  [98 %-99 %] 98 %     Wt Readings from Last 4 Encounters:   10/10/19 74.8 kg (164 lb 14.5 oz)   10/03/19 76.4 kg (168 lb 6.9 oz)   10/01/19 76.4 kg (168 lb 6.9 oz)   09/26/19 77.6 kg (171 lb 1.2 oz)   Weight at diagnosis was 75.2kg, Lazaro considers his baseline weight to be 173-175lbs before he initially got sick  General: alert, interactive and appropriate. NAD.    HEENT: Skull is atrauamatic and normocephalic.  The right side of his face is noticeably full, no venous distention.  Full head of hair. PERRLA, sclera are non icteric and not injected,  EOM are intact. Nares are patent without drainage. Oropharynx clear, no plaques noted. Buccal mucosa and tongue clear. MMM.   Lymph:  Neck is supple without lymphadenopathy.  There is no supraclavicular or axillary ymphadenopathy palpated.  Cardiovascular:  HR is regular, S1, S2 no murmur.  Capillary refill is < 2 seconds. Right arm with edema most concentrated at the elbow but present throughout, is 1+ pitting near the elbow . PICC insertion site without warmth, erythema or drainage.  Cap refill < 2 sec. Peripheral pulses 2+/=.   Respiratory: Loose cough noted. Respirations are easy.  Lungs are clear to auscultation through out.  No crackles or wheezes.  Gastrointestinal:  BS present in all quadrants.  Abdomen is soft and non-tender.  No hepatosplenomegaly or masses are palpated.  Skin: Diffuse truncal and upper extremity papular non-blanching erythematous rash, no pustules noted.  No vesicular lesions.   Neurological:  CN 2-12 grossly intact. Gait is normal.  No issues with balance. Sensation intact in hands and feet.     Musculoskeletal:  Good strength and ROM in all extremities.  Strong dorsiflexion at ankles and great toes (5/5) bilaterally without any pain at the Achilles.    Labs:   Results for orders placed or performed during the hospital encounter of 10/10/19 (from the past 24 hour(s))   CBC with platelets differential   Result Value Ref Range    WBC 3.6 (L) 4.0 - 11.0 10e9/L    RBC Count 4.05 (L) 4.4 - 5.9 10e12/L    Hemoglobin 10.6 (L) 13.3 - 17.7 g/dL    Hematocrit 34.2 (L) 40.0 - 53.0 %    MCV 84 78 - 100 fl    MCH 26.2 (L) 26.5 - 33.0 pg    MCHC 31.0 (L) 31.5 - 36.5 g/dL    RDW 15.7 (H) 10.0 - 15.0 %    Platelet Count 333 150 - 450 10e9/L    Diff Method Automated Method     % Neutrophils 44.5 %    % Lymphocytes 39.3 %    % Monocytes 12.5 %    % Eosinophils 0.6 %    % Basophils 0.6 %    % Immature Granulocytes 2.5 %    Nucleated RBCs 0 0 /100    Absolute Neutrophil 1.6 1.6 - 8.3 10e9/L    Absolute  Lymphocytes 1.4 0.8 - 5.3 10e9/L    Absolute Monocytes 0.5 0.0 - 1.3 10e9/L    Absolute Eosinophils 0.0 0.0 - 0.7 10e9/L    Absolute Basophils 0.0 0.0 - 0.2 10e9/L    Abs Immature Granulocytes 0.1 0 - 0.4 10e9/L    Absolute Nucleated RBC 0.0    Comprehensive metabolic panel   Result Value Ref Range    Sodium 142 133 - 144 mmol/L    Potassium 4.1 3.4 - 5.3 mmol/L    Chloride 108 94 - 109 mmol/L    Carbon Dioxide 30 20 - 32 mmol/L    Anion Gap 4 3 - 14 mmol/L    Glucose 87 70 - 99 mg/dL    Urea Nitrogen 15 7 - 30 mg/dL    Creatinine 0.54 (L) 0.66 - 1.25 mg/dL    GFR Estimate >90 >60 mL/min/[1.73_m2]    GFR Estimate If Black >90 >60 mL/min/[1.73_m2]    Calcium 8.5 8.5 - 10.1 mg/dL    Bilirubin Total 0.2 0.2 - 1.3 mg/dL    Albumin 2.9 (L) 3.4 - 5.0 g/dL    Protein Total 6.0 (L) 6.8 - 8.8 g/dL    Alkaline Phosphatase 87 40 - 150 U/L    ALT 89 (H) 0 - 70 U/L    AST 29 0 - 45 U/L     Procedure:  Lumbar puncture to obtain CSF and administration of intrathecal chemotherapy. Procedure, benefits, risks, and alternatives explained to the patient who voiced understanding of the information and agreed to proceed with lumbar puncture.  Risks include bleeding and or infection. CBC with diff obtained prior to the procedure to ensure platelet count was appropriate to proceed. Area prepped and draped in a sterile fashion. 22 gauge, length: 3.5 cm needle placed between third and fourth vertebrate and 3 mL spinal fluid collected. Afterward syringe with methotrexate 15 mg was applied to the needle and administered intrathecally. Specimen appears clear and colorless. Specimen sent for cell count, differential and cytology. No complications including bleeding, very stable. Patient will remain on back for 1 hour post procedure.    Assessment:  Lazaro Lund is a 19 year old young man with newly diagnosed T Cell lymphoblastic lymphoma (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  He is being treated per COG protocol [id number] and just completed Induction  therapy.  He's had a CRu, resolution of lymphadenopathy however a small pleural effusion persists.  His Day 29 LP was delayed due to follicular lesions near L3-4 and will be made up at on day 29 of consolidation.  His course has been complicated by extensive bilateral DVT, on lovenox for anticoagulation. He has persistent, but stable right facial and upper extremity edema. His therapy is being modified to include dexamethasone (instead of prednisone) which is now considered standard of care. Folliculitis improving on keflex.  Pharyngeal candidiasis resolved.  Anti-Xa level looked good on last check.  He is deficient in vitamin for which he has started on supplementation.     Plan:   1) Proceed with day 1 of consolidation including LP with IT methotrexate, cyclophosphamide, cytarabine, and 6MP; prescriptions for 6MP, zofran, and scope patches sent to [facility] during this visit and will be delivered to [place] before he leaves clinic today; [facility] contacted and confirmed that they will deliver 3 doses of cytarabine to him today   2) Continue lovenox at current dose, level in goal range of 0.6-1.  Confirmed that he held last night and this morning's doses before LP. He was instructed to resume his dosing tomorrow morning (~24 hours after LP).  Will maintain platelet count > 30K while on anticoagulation. Plan to repeat U/S next week prior to his LP. Factor V leiden and prothrombin studies pending from today and he is seeing PT for post-phlebitic syndrome tomorrow.   3) Continue Vit D 3 2000IU daily, recheck level in early December  4) Day 29 LP deferred, will make up on Day 29 of Consolidation.   5) Given history of significant spinal headache, IV caffeine given today following LP and with all subsequent LPs.   6) Cardiology follow up next week given presenting cardiac tamponade and pericardial effusion along with recent abnormal EKG.  7) Plan to repeat CT at the end of Consolidation, if significant pleural  effusion persists at that time we will have it tapped to see if malignant fluid persists.  8) Fluconazole treatment complete, continue keflex as previously prescribed  9) May need to consider femoral port, will await U/S next week to make a final assessment and plan  10) RTC in 1 week for day 8 of consolidation including an UE US. Reviewed s/sx to monitor for and asked him to call if he notes increased swelling in his face or right arm.    Reynaldo Campuzano MD  Pediatric Hematology/Oncology  [facility]  Pager [phone]

## 2019-10-10 NOTE — PROGRESS NOTES
Pediatric Hematology/Oncology Clinic Note    Lazaro Lund is a 19 year old young man with newly diagnosed T-cell lymphoblastic lymphoma. Lazaro presented with acute onset cough and was found to have an anterior mediastinal mass and malignant left-sided pleural effusion.  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  He was admitted to [facility] oncology service 8/30 and started on treatment per [id number].  He had a pleural effusion that required a chest tube and a pericardial effusion that was not drained. His Induction was complicated by cardiac compression secondary to his mass leading to tamponade, this improved through out his hospital stay.  He also had dysphagia that improved with treatment of his mass, swallow study was normal. His course was recently complicated by extensive bilateral UE DVTs for which anticoagulation was started. His Day 29 LP was cancelled due to folliculitis overlying his lumbar spine.  Lazaro comes to clinic today for day 1 of consolidation.     HPI:   Since Lazaro's last visit he has been feeling well. No new symptoms or concerns. Facial and right arm swelling are stable. His lovenox injections are going well and his last injection was yesterday morning. Denies SOB. No new skin issues, he feels his folliculitis is unchanged.     Lazaro denies blurry vision.  His appetite is back to baseline. No paresthesias. Lazaro denies constipation, using senna PRN.  No fever.  He is sleeping well at night, trying to elevate his arm when he sleeps.    Review of systems:  Remainder of ROS is complete and negative.    PMH:   Past Medical History:   Diagnosis Date     DVT of upper extremity (deep vein thrombosis) (H) 09/26/2019    Bilateral      Migraine 2006    have resolved     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   Spinal HA following LP  TPMT shows Intermediate activity    PFMH:   Family History   Problem Relation Age of Onset     No  Known Problems Mother      No Known Problems Father      Asthma Brother      Thyroid Cancer Paternal Grandmother      Melanoma Paternal Aunt        Social History: Lazaro previously lived at home with his dad and step mom in [place] but is now staying with his mom in [place].  He was working full time at [employer] in [place] prior to his diagnosis, but has since had to quit his job.    Current Medications:  Current Outpatient Medications   Medication Sig Dispense Refill     scopolamine (TRANSDERM) 1 MG/3DAYS 72 hr patch Place 1 patch onto the skin every 72 hours 10 patch 0     [START ON 10/11/2019] cytarabine, PF, (CYTOSAR) 100 MG/ML injection CHEMO Inject 1.5 mLs (150 mg) Subcutaneous daily for 3 days Start day after clinic dose. 4.5 mL 0     mercaptopurine (PURINETHOL) 50 MG tablet CHEMO Take 2.5 tablets (125mg) five days/week and 2 tablets (100mg) two days/week. Take on Days 1-14. 33 tablet 0     ondansetron (ZOFRAN) 4 MG tablet Take 2 tablets (8 mg) by mouth every 6 hours as needed (nausea / vomiting) 30 tablet 1   Above medications reviewed. Taking senna PRN.  Influenza vaccine 2019/2020: 10/3/19    Physical Exam:   Temp:  [97.4  F (36.3  C)-97.9  F (36.6  C)] 97.9  F (36.6  C)  Pulse:  [66-78] 66  Heart Rate:  [67-74] 71  Resp:  [16-30] 30  BP: ()/(51-74) 112/73  SpO2:  [98 %-99 %] 98 %     Wt Readings from Last 4 Encounters:   10/10/19 74.8 kg (164 lb 14.5 oz)   10/03/19 76.4 kg (168 lb 6.9 oz)   10/01/19 76.4 kg (168 lb 6.9 oz)   09/26/19 77.6 kg (171 lb 1.2 oz)   Weight at diagnosis was 75.2kg, Lazaro considers his baseline weight to be 173-175lbs before he initially got sick  General: alert, interactive and appropriate. NAD.    HEENT: Skull is atrauamatic and normocephalic.  The right side of his face is noticeably full, no venous distention.  Full head of hair. PERRLA, sclera are non icteric and not injected, EOM are intact. Nares are patent without drainage. Oropharynx clear, no plaques noted.  Buccal mucosa and tongue clear. MMM.   Lymph:  Neck is supple without lymphadenopathy.  There is no supraclavicular or axillary ymphadenopathy palpated.  Cardiovascular:  HR is regular, S1, S2 no murmur.  Capillary refill is < 2 seconds. Right arm with edema most concentrated at the elbow but present throughout, is 1+ pitting near the elbow . PICC insertion site without warmth, erythema or drainage.  Cap refill < 2 sec. Peripheral pulses 2+/=.   Respiratory: Loose cough noted. Respirations are easy.  Lungs are clear to auscultation through out.  No crackles or wheezes.  Gastrointestinal:  BS present in all quadrants.  Abdomen is soft and non-tender.  No hepatosplenomegaly or masses are palpated.  Skin: Diffuse truncal and upper extremity papular non-blanching erythematous rash, no pustules noted.  No vesicular lesions.   Neurological:  CN 2-12 grossly intact. Gait is normal.  No issues with balance. Sensation intact in hands and feet.     Musculoskeletal:  Good strength and ROM in all extremities.  Strong dorsiflexion at ankles and great toes (5/5) bilaterally without any pain at the Achilles.    Labs:   Results for orders placed or performed during the hospital encounter of 10/10/19 (from the past 24 hour(s))   CBC with platelets differential   Result Value Ref Range    WBC 3.6 (L) 4.0 - 11.0 10e9/L    RBC Count 4.05 (L) 4.4 - 5.9 10e12/L    Hemoglobin 10.6 (L) 13.3 - 17.7 g/dL    Hematocrit 34.2 (L) 40.0 - 53.0 %    MCV 84 78 - 100 fl    MCH 26.2 (L) 26.5 - 33.0 pg    MCHC 31.0 (L) 31.5 - 36.5 g/dL    RDW 15.7 (H) 10.0 - 15.0 %    Platelet Count 333 150 - 450 10e9/L    Diff Method Automated Method     % Neutrophils 44.5 %    % Lymphocytes 39.3 %    % Monocytes 12.5 %    % Eosinophils 0.6 %    % Basophils 0.6 %    % Immature Granulocytes 2.5 %    Nucleated RBCs 0 0 /100    Absolute Neutrophil 1.6 1.6 - 8.3 10e9/L    Absolute Lymphocytes 1.4 0.8 - 5.3 10e9/L    Absolute Monocytes 0.5 0.0 - 1.3 10e9/L    Absolute  Eosinophils 0.0 0.0 - 0.7 10e9/L    Absolute Basophils 0.0 0.0 - 0.2 10e9/L    Abs Immature Granulocytes 0.1 0 - 0.4 10e9/L    Absolute Nucleated RBC 0.0    Comprehensive metabolic panel   Result Value Ref Range    Sodium 142 133 - 144 mmol/L    Potassium 4.1 3.4 - 5.3 mmol/L    Chloride 108 94 - 109 mmol/L    Carbon Dioxide 30 20 - 32 mmol/L    Anion Gap 4 3 - 14 mmol/L    Glucose 87 70 - 99 mg/dL    Urea Nitrogen 15 7 - 30 mg/dL    Creatinine 0.54 (L) 0.66 - 1.25 mg/dL    GFR Estimate >90 >60 mL/min/[1.73_m2]    GFR Estimate If Black >90 >60 mL/min/[1.73_m2]    Calcium 8.5 8.5 - 10.1 mg/dL    Bilirubin Total 0.2 0.2 - 1.3 mg/dL    Albumin 2.9 (L) 3.4 - 5.0 g/dL    Protein Total 6.0 (L) 6.8 - 8.8 g/dL    Alkaline Phosphatase 87 40 - 150 U/L    ALT 89 (H) 0 - 70 U/L    AST 29 0 - 45 U/L     Procedure:  Lumbar puncture to obtain CSF and administration of intrathecal chemotherapy. Procedure, benefits, risks, and alternatives explained to the patient who voiced understanding of the information and agreed to proceed with lumbar puncture.  Risks include bleeding and or infection. CBC with diff obtained prior to the procedure to ensure platelet count was appropriate to proceed. Area prepped and draped in a sterile fashion. 22 gauge, length: 3.5 cm needle placed between third and fourth vertebrate and 3 mL spinal fluid collected. Afterward syringe with methotrexate 15 mg was applied to the needle and administered intrathecally. Specimen appears clear and colorless. Specimen sent for cell count, differential and cytology. No complications including bleeding, very stable. Patient will remain on back for 1 hour post procedure.    Assessment:  Lazaro Lund is a 19 year old young man with newly diagnosed T Cell lymphoblastic lymphoma (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  He is being treated per [facility] protocol [id number] and just completed Induction therapy.  He's had a CRu, resolution of lymphadenopathy however a small pleural effusion  persists.  His Day 29 LP was delayed due to follicular lesions near L3-4 and will be made up at on day 29 of consolidation.  His course has been complicated by extensive bilateral DVT, on lovenox for anticoagulation. He has persistent, but stable right facial and upper extremity edema. His therapy is being modified to include dexamethasone (instead of prednisone) which is now considered standard of care. Folliculitis improving on keflex.  Pharyngeal candidiasis resolved.  Anti-Xa level looked good on last check.  He is deficient in vitamin for which he has started on supplementation.     Plan:   1) Proceed with day 1 of consolidation including LP with IT methotrexate, cyclophosphamide, cytarabine, and 6MP; prescriptions for 6MP, zofran, and scope patches sent to [facility] pharmacy during this visit and will be delivered to [facility] before he leaves clinic today; [facility] contacted and confirmed that they will deliver 3 doses of cytarabine to him today   2) Continue lovenox at current dose, level in goal range of 0.6-1.  Confirmed that he held last night and this morning's doses before LP. He was instructed to resume his dosing tomorrow morning (~24 hours after LP).  Will maintain platelet count > 30K while on anticoagulation. Plan to repeat U/S next week prior to his LP. Factor V leiden and prothrombin studies pending from today and he is seeing PT for post-phlebitic syndrome tomorrow.   3) Continue Vit D 3 2000IU daily, recheck level in early December  4) Day 29 LP deferred, will make up on Day 29 of Consolidation.   5) Given history of significant spinal headache, IV caffeine given today following LP and with all subsequent LPs.   6) Cardiology follow up next week given presenting cardiac tamponade and pericardial effusion along with recent abnormal EKG.  7) Plan to repeat CT at the end of Consolidation, if significant pleural effusion persists at that time we will have it tapped to see if malignant fluid  persists.  8) Fluconazole treatment complete, continue keflex as previously prescribed  9) May need to consider femoral port, will await U/S next week to make a final assessment and plan  10) RTC in 1 week for day 8 of consolidation including an UE US. Reviewed s/sx to monitor for and asked him to call if he notes increased swelling in his face or right arm.    Reynaldo Campuzano MD  Pediatric Hematology/Oncology  [facility]  Pager [phone]

## 2019-10-10 NOTE — ANESTHESIA CARE TRANSFER NOTE
Patient: Lazaro Lund    Procedure(s):  Lumbar puncture with IT Chemo (CD)    Diagnosis: lymphoma  Diagnosis Additional Information: No value filed.    Anesthesia Type:   General     Note:  Airway :Nasal Cannula  Patient transferred to: Recovery  Handoff Report: Identifed the Patient, Identified the Reponsible Provider, Reviewed the pertinent medical history, Discussed the surgical course, Reviewed Intra-OP anesthesia mangement and issues during anesthesia, Set expectations for post-procedure period and Allowed opportunity for questions and acknowledgement of understanding      Vitals: (Last set prior to Anesthesia Care Transfer)    CRNA VITALS  10/10/2019 0839 - 10/10/2019 0912      10/10/2019             NIBP:  105/60    Pulse:  80    Temp:  36.4  C (97.5  F)    SpO2:  98 %    Resp Rate (observed):  18                Electronically Signed By: YOHAN Suarez CRNA  October 10, 2019  9:12 AM

## 2019-10-10 NOTE — LETTER
10/10/2019      RE: Lazaro Lund  [address] [facility] [address]       [facility] [place] [facility]\""  PEDIATRIC HEMATOLOGY/ONCOLOGY   SOCIAL WORK PROGRESS NOTE      DATA:     Lazaro is a 18 year old male with newly diagnosed T-Cell Lymphoblastic Lymphoma. He presents to clinic infusion on this date to begin Day 1 of consolidation per protocol [id number]. SW met supportively with Lazaro during his infusion. Lazaro was recently approved for Medical Assistance and has been receiving. He explained that it looks like it only backdated to 9/1/19. He did note of the application he needed it to backdate 90 days. He is hoping that this can be adjusted. SW e-mailed financial counseling to inquire about this. Awaiting response at present time. He asked how to have his bills re-submitted to medical assistance. HONEY explained he should call the billing office number of the statements from  and [facility] and provide his new MA number and request they re-bill all outstanding bills. He will call them on Friday. Lazaro shared that he is feeling well for the most part. He is enjoying being at his Moms as it is more quiet and he feels well supported by Mom and Grandmother (who also lives with Mom). He is hoping to hear from social security later this month regarding approval or denial. He is still working on his health care directive as previously provided. HONEY will follow-up with him next visit to check-in. He has SW contact info should immediate needs/concerns arise between appointments.     INTERVENTION:     1. Supportive counseling. Check-in.  2. Discussion re: Medical Assistance and getting medical bills re-submitted to MA for coverage.   3. Follow-up re: Health Care Directive.     ASSESSMENT:     Lazaro appears to be doing well. He is coping with his treatment within normal limits. He feels his mood is stable. He has adequate familial and social support. He is open to and appreciative of ongoing  therapeutic support, advocacy, and connection with resources.     PLAN:     E-mailed financial counseling re: backdating of Medical Assistance. Social work will continue to assess needs and provide ongoing psychosocial support and access to resources.      CHEY Davis, DOROTA, OSW-C  Clinical    Pediatric Hematology Oncology   [facility]   Monday-Thursday   Phone: [phone]  Pager: [phone]    NO LETTER                CHEY Alcantara

## 2019-10-10 NOTE — PROGRESS NOTES
Infusion Nursing Note    Lazaro Lund Presents to [facility] infusion [place] today for:Chemotherapy - Cycle 1 Day 1 Cytoxan and Cytarabine    Due to : T lymphoblastic lymphoma (H)    Patient seen by Provider : Yes: Dr.Robin Campuzano - LP in Peds Sedation prior to apt in [facility]    Note: Pt arrived to [facility] after LP in Peds Sedation. Pt denies any recent fever/infections. PIV was placed in Peds Sedation; site WDL. Pre-flush NS 250ml/hr x2 hours given as ordered. Cytarabine given IV Push; positive blood return noted pre/post. Cytoxan infused over one hour as ordered; positive blood return noted pre/post infusion. Post NS flush given at 250ml/hr x2 hours as ordered. PIV removed without difficulty. VSS. Stable pt left clinic at completion of cares.     Intravenous Access: Yes: PIV placed in Peds Sedation    Peripheral IV - in right upper AC     Treatment conditions: Platelet and ANC    Parameters Met for treatment    Pre-Meds:Yes: Zofran given in Peds Sedation; additional Zofran given prior to Cytarabine as ordered    Education provided: Yes: Plan of Care    Post Infusion Assessment: Patient tolerated infusion, Blood return noted pre and post infusion, Vital signs remained stable throughout and PIV removed without issue    Discharge Plan:   Prescription refills given for  requested meds  Patient verbalized understanding of discharge instructions, all questions answered. Patient left clinic accompanied by Self; mom picking pt up in lobby per pt.

## 2019-10-10 NOTE — DISCHARGE INSTRUCTIONS
[facility]   [phone]    Care post Lumbar Puncture     Do not remove bandage/dressing for 24 hours -- after this time they can be removed    No bath, shower or soaking of the dressing for 24 hours    Activity as tolerated by the patient    Diet as able to tolerated    May use Tylenol as needed for pain control -- DO NOT use Ibuprofen    Can apply icepack to the site for discomfort -- no more than 10 minutes at a time    If bleeding presents apply pressure for 5 minutes    Call [phone] ask for Peds BMT/Hem/Onc fellow on call if complications arise including:    persistent bleeding    fever greater than 100.5    Pain    Lumbar punctures can cause headache. If the pain is not controlled with Tylenol (acetaminophen) please call the Peds BMT/Hem/Onc fellow on call    Home Instructions for You after Sedation  Today you received (medicine):  Propofol, Zofran and Caffeine  Please keep this form with your health records  You may be more sleepy today than normal. Also, an adult should stay with you for the rest of the day. The medicine may make you dizzy. Avoid activities that require balance (bike riding, skating, climbing stairs, walking).  Remember:    When you want to eat again, start with liquids (juice, soda pop, Popsicles). If you feel well enough, you may try a regular diet. It is best to offer light meals for the first 24 hours.    If you have nausea (feels sick to the stomach) or vomiting (throws up), take small amounts of clear liquids (7-Up, Sprite, apple juice or broth). Fluids are more important than food until you are feeling better.    Wait 24 hours before giving medicine that contains alcohol. This includes liquid cold, cough and allergy medicines (Robitussin, Vicks Formula 44 for children, Benadryl, Chlor-Trimeton).  Call your doctor if:    You have questions about the test results.    You vomit (throws up) more than two times.    If your child has trouble breathing, call [phone].  If you have any  questions or concerns, please call:  Pediatric Sedation Unit [phone]  Pediatric clinic  [phone]  [facility]  [phone]  Emergency department [phone]  [facility] toll-free number [phone] (Monday--Friday, 8 a.m. to 4:30 p.m.)  I understand these instructions. I have all of my personal belongings.

## 2019-10-10 NOTE — LETTER
Date:October 17, 2019      Provider requested that no letter be sent. Do not send.       [facility] Health Information

## 2019-10-10 NOTE — ANESTHESIA PREPROCEDURE EVALUATION
Anesthesia Pre-Procedure Evaluation    Patient: Lazaro Lund   MRN:     [id number] Gender:   male   Age:    19 year old :      2000        Preoperative Diagnosis: lymphoma   Procedure(s):  Lumbar puncture with IT Chemo (CD)     Past Medical History:   Diagnosis Date     DVT of upper extremity (deep vein thrombosis) (H) 2019    Bilateral      Migraine 2006    have resolved     T lymphoblastic lymphoma (H) 2019      Past Surgical History:   Procedure Laterality Date     BONE MARROW BIOPSY, BONE SPECIMEN, NEEDLE/TROCAR Left 2019    Procedure: BIOPSY, BONE MARROW;  Surgeon: Heather Lopez MD;  Location: [facility] OR     INSERT PICC LINE N/A 2019    Procedure: INSERTION, PICC;  Surgeon: Michell Keith MD;  Location: [facility] OR     IR CHEST TUBE PLACEMENT NON-TUNNELLED LEFT  2019     IR PICC PLACEMENT > 5 YRS OF AGE  2019     SPINAL PUNCTURE,LUMBAR, INTRATHECAL CHEMO DELIVERY N/A 2019    Procedure: LUMBAR PUNCTURE, WITH INTRATHECAL CHEMOTHERAPY ADMINISTRATION;  Surgeon: Heather Lopez MD;  [facility] OR     SPINAL PUNCTURE,LUMBAR, INTRATHECAL CHEMO DELIVERY N/A 2019    Procedure: Lumbar Puncture With Intrathecal Chemo;  Surgeon: Alexi Hayes MD;  Location: [facility] OR     THORACENTESIS N/A 2019    Procedure: Thoracentesis;  Surgeon: Michell Keith MD;  Location: [facility] OR          Anesthesia Evaluation    ROS/Med Hx    History of anesthetic complications (PONV)  (-) malignant hyperthermia and tuberculosis    Cardiovascular Findings - negative ROS  (-) congenital heart disease    Neuro Findings - negative ROS    Pulmonary Findings - negative ROS    HENT Findings - negative HENT ROS    Skin Findings - negative skin ROS      GI/Hepatic/Renal Findings - negative ROS    Endocrine/Metabolic Findings - negative ROS      Genetic/Syndrome Findings - negative genetics/syndromes ROS    Hematology/Oncology Findings   (+) cancer (T lymphoblastic lymphoma) and clotting  disorder (DVT on Enoxaparin, last dose > 24 hours)            PHYSICAL EXAM:   Mental Status/Neuro: A/A/O   Airway: Facies: Feasible  Mallampati: II  Mouth/Opening: Full  TM distance: > 6 cm  Neck ROM: Full   Respiratory: Auscultation: CTAB     Resp. Rate: Normal     Resp. Effort: Normal      CV: Rhythm: Regular  Rate: Age appropriate  Heart: Normal Sounds  Edema: None   Comments:      Dental: Normal Dentition                  LABS:  CBC:   Lab Results   Component Value Date    WBC 4.3 10/03/2019    WBC 7.4 10/01/2019    HGB 11.8 (L) 10/03/2019    HGB 12.1 (L) 10/01/2019    HCT 38.9 (L) 10/03/2019    HCT 38.0 (L) 10/01/2019     10/03/2019     10/01/2019     BMP:   Lab Results   Component Value Date     10/01/2019     09/24/2019    POTASSIUM 3.8 10/01/2019    POTASSIUM 4.4 09/24/2019    CHLORIDE 102 10/01/2019    CHLORIDE 107 09/24/2019    CO2 31 10/01/2019    CO2 23 09/24/2019    BUN 16 10/01/2019    BUN 33 (H) 09/24/2019    CR 0.44 (L) 10/01/2019    CR 0.50 (L) 09/29/2019    GLC 74 10/01/2019     (H) 09/24/2019     COAGS:   Lab Results   Component Value Date    PTT 32 09/16/2019    INR 1.26 (H) 09/16/2019    FIBR 75 (LL) 09/16/2019     POC:   Lab Results   Component Value Date    BGM 80 08/31/2019     OTHER:   Lab Results   Component Value Date    MICHAEL 7.5 (L) 10/01/2019    PHOS 3.4 09/09/2019    MAG 2.0 09/09/2019    ALBUMIN 2.7 (L) 09/16/2019    PROTTOTAL 5.2 (L) 09/16/2019    ALT 55 09/16/2019    AST 17 09/16/2019    ALKPHOS 144 09/16/2019    BILITOTAL 0.5 09/16/2019        Preop Vitals    BP Readings from Last 3 Encounters:   10/10/19 114/74   10/03/19 112/67   10/01/19 119/68    Pulse Readings from Last 3 Encounters:   10/10/19 78   10/03/19 99   09/28/19 77      Resp Readings from Last 3 Encounters:   10/10/19 16   10/03/19 18   10/01/19 16    SpO2 Readings from Last 3 Encounters:   10/10/19 98%   10/03/19 100%   10/01/19 98%      Temp Readings from Last 1 Encounters:  "  10/10/19 36.6  C (97.8  F) (Oral)    Ht Readings from Last 1 Encounters:   10/10/19 1.845 m (6' 0.64\")      Wt Readings from Last 1 Encounters:   10/10/19 74.8 kg (164 lb 14.5 oz)    Estimated body mass index is 21.97 kg/m  as calculated from the following:    Height as of this encounter: 1.845 m (6' 0.64\").    Weight as of this encounter: 74.8 kg (164 lb 14.5 oz).     LDA:  Peripheral IV 10/10/19 Left Upper forearm (Active)   Site Assessment WDL 10/10/2019  7:47 AM   Line Status Saline locked 10/10/2019  7:47 AM   Number of days: 0        Assessment:   ASA SCORE: 3    H&P: History and physical reviewed and following examination; no interval change.     Smoking Status:  Active Smoker       - patient did not smoke on day of surgery       - instructed to abstain from smoking on day of procedure   NPO Status: NPO Appropriate     Plan:   Anes. Type:  General   Pre-Medication: None   Induction:  IV (Standard)   Airway: Native Airway   Access/Monitoring: PIV   Maintenance: Propofol Sedation     Postop Plan:   Postop Pain: None  Postop Sedation/Airway: Not planned  Disposition: Outpatient     PONV Management:    Prevention: Ondansetron, Scopolamine, Propofol     CONSENT: Direct conversation   Plan and risks discussed with: Patient   Blood Products: Consent Deferred (Minimal Blood Loss)       Comments for Plan/Consent:  Discussed common and potentially harmful risks for General Anesthesia, Native Airway.   These risks include, but were not limited to: Conversion to secured airway, Sore throat, Airway injury, Dental injury, Aspiration, Respiratory issues (Bronchospasm, Laryngospasm, Desaturation), Hemodynamic issues (Arrhythmia, Hypotension, Ischemia), Potential long term consequences of respiratory and hemodynamic issues, PONV, Emergence delirium  Risks of invasive procedures were not discussed: N/A    All questions were answered.         Kyle Palomino MD  "

## 2019-10-10 NOTE — OR NURSING
Pt alert, VSS,  No c/o pain or h/a. Caffiene completed. Pt eating and drinking well. Report called to [facility] clinic. Discharge instructions reviewed with pt. Pt discharged to [facility] clinic via w/c with NST accompanying pt.

## 2019-10-10 NOTE — ANESTHESIA POSTPROCEDURE EVALUATION
Anesthesia POST Procedure Evaluation    Patient: Lazaro Lund   MRN:     [id number] Gender:   male   Age:    19 year old :      2000        Preoperative Diagnosis: lymphoma   Procedure(s):  Lumbar puncture with IT Chemo (CD)   Postop Comments: No value filed.       Anesthesia Type:  Not documented  General    Reportable Event: NO     PAIN: Uncomplicated   Sign Out status: Comfortable, Well controlled pain     PONV: No PONV   Sign Out status:  No Nausea or Vomiting     Neuro/Psych: Uneventful perioperative course   Sign Out Status: Preoperative baseline; Age appropriate mentation     Airway/Resp.: Uneventful perioperative course   Sign Out Status: Non labored breathing, age appropriate RR; Resp. Status within EXPECTED Parameters     CV: Uneventful perioperative course   Sign Out status: Appropriate BP and perfusion indices; Appropriate HR/Rhythm     Disposition:   Sign Out in:  PACU  Disposition:  Phase II; Home  Recovery Course: Uneventful  Follow-Up: Not required     Comments/Narrative:  - Uneventful, ready for discharge           Last Anesthesia Record Vitals:  CRNA VITALS  10/10/2019 0839 - 10/10/2019 0939      10/10/2019             NIBP:  105/60    Pulse:  80    Temp:  36.4  C (97.5  F)    SpO2:  98 %    Resp Rate (observed):  18    EKG:  NSR          Last PACU Vitals:  Vitals Value Taken Time   /57 10/10/2019  9:30 AM   Temp 36.4  C (97.6  F) 10/10/2019  9:30 AM   Pulse 76 10/10/2019  9:30 AM   Resp 15 10/10/2019  9:33 AM   SpO2 98 % 10/10/2019  9:33 AM   Temp src     NIBP 105/60 10/10/2019  9:10 AM   Pulse 80 10/10/2019  9:10 AM   SpO2 98 % 10/10/2019  9:10 AM   Resp     Temp 36.4  C (97.5  F) 10/10/2019  9:10 AM   Ht Rate     Temp 2     Vitals shown include unvalidated device data.      Electronically Signed By: Kyle Palomino MD, October 10, 2019, 10:06 AM

## 2019-10-11 ENCOUNTER — HOSPITAL ENCOUNTER (OUTPATIENT)
Dept: PHYSICAL THERAPY | Facility: CLINIC | Age: 19
Setting detail: THERAPIES SERIES
End: 2019-10-11
Attending: NURSE PRACTITIONER
Payer: MEDICAID

## 2019-10-11 ENCOUNTER — HOME INFUSION (PRE-WILLOW HOME INFUSION) (OUTPATIENT)
Dept: PHARMACY | Facility: CLINIC | Age: 19
End: 2019-10-11

## 2019-10-11 DIAGNOSIS — I82.623 ACUTE DEEP VEIN THROMBOSIS (DVT) OF OTHER VEIN OF BOTH UPPER EXTREMITIES (H): ICD-10-CM

## 2019-10-11 DIAGNOSIS — C83.50 T LYMPHOBLASTIC LYMPHOMA (H): ICD-10-CM

## 2019-10-11 PROCEDURE — 97140 MANUAL THERAPY 1/> REGIONS: CPT | Mod: GP

## 2019-10-11 PROCEDURE — 97163 PT EVAL HIGH COMPLEX 45 MIN: CPT | Mod: GP

## 2019-10-11 NOTE — PROGRESS NOTES
This is a recent snapshot of the patient's [facility] medical record.  For current drug dose and complete information and questions, call [phone] or In Basket pool, fv home infusion ([id number])  CSN Number:  [id number]

## 2019-10-11 NOTE — PROGRESS NOTES
10/11/19 0859   Quick Adds   Quick Adds Certification   Rehab Discipline   Discipline PT   Type of Visit   Type of visit Initial Edema Evaluation       present No   General Information   Start of care 10/11/19   Referring physician YOHAN Dunn CNP   Orders Evaluate and treat as indicated   Order date 10/04/19   Medical diagnosis T Lymphoblastic Lymphoma and Acute DVT of other vein of both upper extremities   Onset of illness / date of surgery 09/15/19   Edema onset 09/15/19   Affected body parts RUE   Edema etiology Chemo;DVT   Chemotherapy comments Lumbar Puncture and Pill   Edema etiology comments Bilateral DVT right arm affected more than left.   Pertinent history of current problem (PT: include personal factors and/or comorbidities that impact the POC; OT: include additional occupational profile info) Patient is a 19 yeard old male with right UE edema following a PICC line infection and right UE blood clot of the right internal jugular, right subclavian, R basilic and R brachial veins. Patient has a recent history of active cancer with induction and infusion therapy, acute infection, pericardiac edema, pulmonary edema that required chest tube for management. All history has occured in the past month and patient recieved T Lymphoma diagnoses 2 months ago.   Surgical / medical history reviewed Yes   Edema special tests Ultrasound   Prior level of functional mobility Independent   Prior treatment Elevation   Patient role / employment history Unemployed   Psychosocial concerns Impaired body image;Other   Living environment House / townhome   Living environment comments With family   Assistive device comments None   General observations Swelling/Edema of Right UE and right cheek/face.   Fall Risk Screen   Fall screen completed by PT   Have you fallen 2 or more times in the past year? No   Have you fallen and had an injury in the past year? No   Is patient a fall risk? No   System  "Outcome Measures   Outcome Measures Lymphedema   Subjective Report   Patient report of symptoms Patient reports having a PICC line in his right arm that got infected and was leaking a clear fluid. His arm gradually began swelling more which was suppposedly attributed to DVT and infection. Patient was diagnosed with T-Lymphoma and diagnosed with that about 2 months ago. He had symptoms of bronchitis that did not heal with Anti-Biotics. So they xrayed him and found fluid in his chest cavity and a potential mass. Following a CT scan they found he had cancer.  They drained his lungs which had 2L of fluid followed by 4L, 2L and 1L. Patient also reports that he had fluid in the pericardial sac which he believes is \"closely gone\". Patient had induction therapy which went well for the first month and yesterday had a lumbar puncture for chemo.    Precautions   Precautions Cardiac Edema/CHF;Acute DVT;Acute Infection;Acute Thrombophlebitis;Active Cancer   Precautions comments Multiple precautions/Contraindications   Patient / Family Goals   Patient / family goals statement To reduce the edema in his right arm   Pain   Patient currently in pain No   Pain comments Some discomfort on palpation in cubital fossa but otherwise no complaints.   Cognitive Status   Orientation Orientation to person, place and time   Level of consciousness Alert   Follows commands and answers questions 100% of the time   Personal safety and judgement Intact   Memory Intact   Edema Exam / Assessment   Skin condition Non-pitting   Skin condition comments Non-pitting edema in right UE slightly larger than left UE   Scar No   Radial pulse Symmetrical   Ulceration No   Girth Measurements   Girth Measurements Refer to separate girth measurement flowsheet   Volume UE   Right UE (mL) 1845.93   Left UE (mL) 1678.96   Range of Motion   ROM No deficits were identified   Strength   Strength No deficits were identified   Posture   Posture Normal   Palpation "   Palpation Unremarkable   Activities of Daily Living   Activities of Daily Living Ind   Bed Mobility   Bed mobility Ind   Transfers   Transfers ind   Gait / Locomotion   Gait / Locomotion Ind   Sensory   Sensory perception comments Unremarkable   Coordination   Coordination Gross motor coordination appropriate   Muscle Tone   Muscle tone No deficits were identified   Planned Edema Interventions   Planned edema interventions Manual lymph drainage   Planned edema intervention comments Defer formal complete decongestive therapy at this time due to multiple contraindications. Patient was provided with Self-MLD for management on his own if edema does not reside in 1-2 weeks and his DVT, Infection and Cardiac/Pulmonary Edema resolve. Patient was given the therapists contact information and advised to contact the therapist if the edema does not resolve on its own.   Clinical Impression   Criteria for skilled therapeutic intervention met Yes   Therapy diagnosis Right UE edema associated with DVT   Influenced by the following impairments / conditions Edema   Functional limitations due to impairments / conditions Arm was more difficult to use with day to day tasks due to increased edema.   Clinical Presentation Unstable/Unpredictable   Clinical Presentation Rationale Based on systems involved (Cardic/Pulmonary, Venous/Vascular, Lymphatic, Oncological), past medical history, evaluation, observation, other treatments currently being performed, and clinical reasoning   Clinical Decision Making (Complexity) High complexity   Treatment Frequency 1x/week   Treatment duration One Eval and One Treatment plus potentially future visits if edema does not subside on its own.   Patient / family and/or staff in agreement with plan of care Yes   Risks and benefits of therapy have been explained Yes   Clinical impression comments Patient presents with right UE edema and right facial edema associated with DVT in the right sided neck, axilla  and arm. Patient has multiple contraindications for CDT and therefor has been deferred from CDT at this time. Patient reports yesterday and some of today his edema has been reduced so as the blood clot is treated it is expected his edema will naturally resolve. If it does not in 1-2 weeks the patient was advised to perform light MLD if needed given that his cardiac/pulmonary edema resolves and he has no evidence of infection or DVT. Patient can also call and connect with the therapist if his symptoms do not resolve on their own for a follow-up.   Goals   Edema Eval Goals 1   Goal 1   Goal identifier Independent   Goal description Patient will understand his edema cause and needs for treatment. Patient will understand and be independent with self-MLD which is to be used in the future once his DVT resolves, Cardiac/Pulmonary Edema resolves and patient has no infection.   Target date 10/11/19   Date met 10/11/19   Total Evaluation Time   PT Eval, High Complexity Minutes (13730) 24   Certification   Certification date from 10/11/19   Certification date to 01/08/20   Medical Diagnosis T Lymphoblastic lymphoma and Acute DVT of other vein of both upper extremities   Certification I certify the need for these services furnished under this plan of treatment and while under my care.  (Physician co-signature of this document indicates review and certification of the therapy plan).

## 2019-10-11 NOTE — PROGRESS NOTES
[facility]        OUTPATIENT PHYSICAL THERAPY EDEMA EVALUATION  PLAN OF TREATMENT FOR OUTPATIENT REHABILITATION  (COMPLETE FOR INITIAL CLAIMS ONLY)  Patient's Last Name, First Name, M.I.  KalerobbinLazaro PLUNKETT                           Provider s Name:   [facility] Medical Record No.  [id number]     Start of Care Date:  10/11/19   Onset Date:  09/15/19   Type:  PT   Medical Diagnosis:  T Lymphoblastic lymphoma and Acute DVT of other vein of both upper extremities   Therapy Diagnosis:  Right UE edema associated with DVT Visits from SOC:  1                                     __________________________________________________________________________________   Plan of Treatment/Functional Goals:    Manual lymph drainage  Defer formal complete decongestive therapy at this time due to multiple contraindications. Patient was provided with Self-MLD for management on his own if edema does not reside in 1-2 weeks and his DVT, Infection and Cardiac/Pulmonary Edema resolve. Patient was given the therapists contact information and advised to contact the therapist if the edema does not resolve on its own.     GOALS  1. Goal description: Patient will understand his edema cause and needs for treatment. Patient will understand and be independent with self-MLD which is to be used in the future once his DVT resolves, Cardiac/Pulmonary Edema resolves and patient has no infection.       Target date: 10/11/19              Treatment Frequency: 1x/week   Treatment duration: One Eval and One Treatment plus potentially future visits if edema does not subside on its own.    Moose Alfaro, PT                                    I CERTIFY THE NEED FOR THESE SERVICES FURNISHED UNDER        THIS PLAN OF TREATMENT AND WHILE UNDER MY CARE     (Physician co-signature of this document indicates review and  certification of the therapy plan).                   Certification date from: 10/11/19       Certification date to: 01/08/20           Referring physician: YOHAN Dunn CNP   Initial Assessment  See Epic Evaluation- Start of care: 10/11/19

## 2019-10-12 LAB
APPEARANCE CSF: CLEAR
COLOR CSF: COLORLESS
RBC # CSF MANUAL: 89 /UL (ref 0–2)
TUBE # CSF: 1 #
WBC # CSF MANUAL: 0 /UL (ref 0–5)

## 2019-10-14 ENCOUNTER — OFFICE VISIT (OUTPATIENT)
Dept: PEDIATRIC CARDIOLOGY | Facility: CLINIC | Age: 19
End: 2019-10-14
Attending: PEDIATRICS
Payer: COMMERCIAL

## 2019-10-14 ENCOUNTER — HOSPITAL ENCOUNTER (OUTPATIENT)
Dept: CARDIOLOGY | Facility: CLINIC | Age: 19
Discharge: HOME | End: 2019-10-14
Attending: PEDIATRICS | Admitting: PEDIATRICS
Payer: COMMERCIAL

## 2019-10-14 VITALS
RESPIRATION RATE: 20 BRPM | DIASTOLIC BLOOD PRESSURE: 64 MMHG | BODY MASS INDEX: 21.94 KG/M2 | HEIGHT: 73 IN | OXYGEN SATURATION: 97 % | WEIGHT: 165.57 LBS | HEART RATE: 107 BPM | SYSTOLIC BLOOD PRESSURE: 105 MMHG

## 2019-10-14 DIAGNOSIS — I31.39 PERICARDIAL EFFUSION: ICD-10-CM

## 2019-10-14 DIAGNOSIS — I31.39 PERICARDIAL EFFUSION: Primary | ICD-10-CM

## 2019-10-14 LAB — COPATH REPORT: NORMAL

## 2019-10-14 PROCEDURE — 93306 TTE W/DOPPLER COMPLETE: CPT

## 2019-10-14 PROCEDURE — G0463 HOSPITAL OUTPT CLINIC VISIT: HCPCS | Mod: 25,ZF

## 2019-10-14 ASSESSMENT — MIFFLIN-ST. JEOR: SCORE: 1804.13

## 2019-10-14 NOTE — PROGRESS NOTES
This is a recent snapshot of the patient's [facility] medical record.  For current drug dose and complete information and questions, call [phone] or In Basket pool, fv home infusion ([id number])  CSN Number:  [id number]

## 2019-10-14 NOTE — PROVIDER NOTIFICATION
09/26/19 1300   Child Life   Location Hem/Onc Clinic  (f/u for Lymphoma)   Intervention Follow Up;Preparation  (CCLS followed up with patient regarding request for port education materials. Patient had requested written materials instead of port prep using teaching doll. CCLS provided port prep handout to patient. Patient declined any other questions about the port. CCLS reviewed ways CFL can help support patient.)   Major Change/Loss/Stressor/Fears medical condition, self  (newly diagnosed lymphoma)   Outcomes/Follow Up Continue to Follow/Support

## 2019-10-14 NOTE — NURSING NOTE
"Chief Complaint   Patient presents with     Consult     Cardiac tamponade; pericardial effusion      Vitals:    10/14/19 1319   BP: 105/64   BP Location: Left arm   Patient Position: Chair   Cuff Size: Adult Regular   Pulse: 107   Resp: 20   SpO2: 97%   Weight: 165 lb 9.1 oz (75.1 kg)   Height: 6' 0.64\" (184.5 cm)     Fay Pimentel LPN  October 14, 2019  "

## 2019-10-14 NOTE — PROGRESS NOTES
2019    Rodriguez Montoya  [address] [address]    RE: Lazaro Lund  MRN: [id number]  : 2000    Dear Dr. Montoya,      I had the pleasure of evaluating your patient, Lazaro Lund, on 10/14/2019 at the Pediatric Cardiology Clinic at the [facility].  As you know, Lazaro is a 19 year old male who is seen today for cardiology follow-up for pericardial effusion. Lazaro was recently diagnosed with T-cell lymphoblastic lymphoma after being evaluated for cough and found to have an anterior mediastinal mass and malignant left-sided pleural effusion.  He was admitted to [facility] peds oncology service on 19 to start treatment per [id number].  An echocardiogram performed at that time demonstrated a small to moderate pericardial effusion with tamponade physiology and compression of cardiac structures secondary to the mediastinal mass.  Lazaro also had a large pleural effusion which required chest tube placement.  He did not require drainage of the pericardial effusion.  The effusion and cardiac compression both improved with pleural effusion drainage as well as initiation of treatment. Lazaro has no cardiac history.  He denies symptoms of chest pain, palpitations, shortness of breath, or syncope.  Prior to his recent lymphoma diagnosis, Lazaro was a previously healthy young man with no significant past medical history.     Past medical history is as above.     Family history was reviewed and is non-contributory.  There is no known history of sudden unexplained death, arrhythmias, or congenital heart disease.    He is currently living with Mom and maternal grandmother in [place], [place].  He is not currently working while undergoing treatment.    Complete review of systems was performed and is non-contributory.    Current medications include:   Current Outpatient Medications   Medication Sig Dispense Refill     cytarabine, PF,  "(CYTOSAR) 100 MG/ML injection CHEMO Inject 1.5 mLs (150 mg) Subcutaneous daily for 3 days Start day after clinic dose. 4.5 mL 0     diphenhydrAMINE (BENADRYL) 25 MG capsule Take 1-2 capsules (25-50 mg) by mouth every 6 hours as needed (Breakthrough Nausea and Vomiting ) 30 capsule 1     melatonin 3 MG tablet Take 1 tablet (3 mg) by mouth At Bedtime 30 tablet 1     mercaptopurine (PURINETHOL) 50 MG tablet CHEMO Take 2.5 tablets (125mg) five days/week and 2 tablets (100mg) two days/week. Take on Days 1-14. 33 tablet 0     ondansetron (ZOFRAN) 4 MG tablet Take 2 tablets (8 mg) by mouth every 6 hours as needed (nausea / vomiting) 30 tablet 1     ondansetron (ZOFRAN) 8 MG tablet Take 1 tablet (8 mg) by mouth every 6 hours as needed for nausea 30 tablet 1     oxyCODONE (ROXICODONE) 5 MG tablet Take 1 tablet (5 mg) by mouth every 6 hours as needed for moderate to severe pain 10 tablet 0     scopolamine (TRANSDERM) 1 MG/3DAYS 72 hr patch Place 1 patch onto the skin every 72 hours 10 patch 0     sulfamethoxazole-trimethoprim (BACTRIM DS/SEPTRA DS) 800-160 MG tablet Take 1 tablet by mouth Every Mon, Tues two times daily 16 tablet 0     Vitamin D, Cholecalciferol, 1000 units TABS Take 2 tablets (2,000 Units) by mouth daily 60 tablet 3     pantoprazole (PROTONIX) 40 MG EC tablet Take 1 tablet (40 mg) by mouth daily . This acid blocker helps prevent stomach pain while on your decadron chemotherapy. (Patient not taking: Reported on 10/14/2019) 30 tablet 0       On physical examination today, /64 (BP Location: Left arm, Patient Position: Chair, Cuff Size: Adult Regular)   Pulse 107   Resp 20   Ht 1.845 m (6' 0.64\")   Wt 75.1 kg (165 lb 9.1 oz)   SpO2 97%   BMI 22.06 kg/m    HEENT exam is unremarkable with no dysmorphic features.  There is mild edema of face.  Moist mucous membranes. Conjunctiva are clear.  Lungs are clear to auscultation with equal aeration throughout. There are no wheezes, crackles or retractions.  " Cardiac exam with normal S1 and physiologic splitting of S2, no rubs, click or gallop. There is no murmur present.  Abdomen is soft, non-tender and non-distended.  Liver is palpable at the [facility].  Extremities are warm and well perfused with symmetric upper and lower extremity pulses.  Cap refill is 2 seconds.  Skin is without rash.     EKG from 9/12/2019 which I have reviewed demonstrated sinus bradycardia with right bundle branch block.     Echocardiogram today which I have reviewed demonstrated:  Patient undergoing chemotherapy. Large anterior mediastinal mass, previously causing compression of cardiac structures. No compression of the left atrium, unobstructed flow across the right pulmonarry artery. There is mild flow  acceleration in the left pulmonary artery. The superior vena cava was not well seen on this study. Normal right and left ventricular size and systolic function. Physiologic amount of pericardial fluid is seen.    In summary, Lazaro is a 19 year old male with recent diagnosis of T-cell lymphoblastic lymphoma.  At the time of his diagnosis his echocardiogram demonstrated a small to moderate pericardial effusion with tamponade physiology and compression of cardiac structures secondary to the mediastinal mass.  The pericardial effusion as well as compression of the cardiac structures improved following initiation of treatment and drainage of a large left pleural effusion.  Echocardiogram today demonstrated no compression of the cardiac structures and no pericardial effusion.  The pericardial effusion with tamponade physiology was due to the presence of a large anterior mediastinal mass.  It is possible for there to be recurrence of a pericardial effusion while undergoing treatment for lymphoma.  I recommend routine echocardiogram monitoring per his lymphoma treatment protocol or for clinical concerns.  The abnormal EKG may also be due to altered heart position and compression secondary to the mass at  the time of the study.  He has normal cardiac anatomy by echocardiogram.  He does not need ongoing cardiology follow-up but I would be happy to see him in the future should any concerns arise.     Thank you for allowing me to participate in Lazaro's care.  Please do not hesitate to contact me with any questions or concerns.      LIST OF DIAGNOSES:  1. T-cell lymphoblastic lymphoma  2. History of pericardial effusion - small to moderate with tamponade physiology   - resolved with treatment of lymphoma      Most Sincerely,     Savanah Butler MD  Pediatric Cardiologist

## 2019-10-14 NOTE — PATIENT INSTRUCTIONS
PEDS CARDIOLOGY  [facility] [address] [place] [address]  [address] [facility] [id number] [phone]      Cardiology Clinic  [phone]  RN Care Coordinator, Renee Ritchie (Bre) or Heather March  [phone]  Pediatric Call Center/Scheduling  [phone]    After Hours and Emergency Contact Number  [phone]  * Ask for the pediatric cardiologist on call         Prescription Renewals  The pharmacy must fax requests to [phone]  * Please allow 3-4 days for prescriptions to be authorized

## 2019-10-14 NOTE — LETTER
10/14/2019      RE: Lazaro Lund  [address] [facility] [address]       2019    Rodriguez Montoya  [address] [address]    RE: Lazaro Lund  MRN: [id number]  : 2000    Dear Dr. Montoya,      I had the pleasure of evaluating your patient, Lazaro Lund, on 10/14/2019 at the Pediatric Cardiology Clinic at the [facility].  As you know, Lazaro is a 19 year old male who is seen today for cardiology follow-up for pericardial effusion. Lazaro was recently diagnosed with T-cell lymphoblastic lymphoma after being evaluated for cough and found to have an anterior mediastinal mass and malignant left-sided pleural effusion.  He was admitted to [facility] peds oncology service on 19 to start treatment per [id number].  An echocardiogram performed at that time demonstrated a small to moderate pericardial effusion with tamponade physiology and compression of cardiac structures secondary to the mediastinal mass.  Lazaro also had a large pleural effusion which required chest tube placement.  He did not require drainage of the pericardial effusion.  The effusion and cardiac compression both improved with pleural effusion drainage as well as initiation of treatment. Lazaro has no cardiac history.  He denies symptoms of chest pain, palpitations, shortness of breath, or syncope.  Prior to his recent lymphoma diagnosis, Lazaro was a previously healthy young man with no significant past medical history.     Past medical history is as above.     Family history was reviewed and is non-contributory.  There is no known history of sudden unexplained death, arrhythmias, or congenital heart disease.    He is currently living with Mom and maternal grandmother in [place], [place].  He is not currently working while undergoing treatment.    Complete review of systems was performed and is non-contributory.    Current medications include:  "  Current Outpatient Medications   Medication Sig Dispense Refill     cytarabine, PF, (CYTOSAR) 100 MG/ML injection CHEMO Inject 1.5 mLs (150 mg) Subcutaneous daily for 3 days Start day after clinic dose. 4.5 mL 0     diphenhydrAMINE (BENADRYL) 25 MG capsule Take 1-2 capsules (25-50 mg) by mouth every 6 hours as needed (Breakthrough Nausea and Vomiting ) 30 capsule 1     melatonin 3 MG tablet Take 1 tablet (3 mg) by mouth At Bedtime 30 tablet 1     mercaptopurine (PURINETHOL) 50 MG tablet CHEMO Take 2.5 tablets (125mg) five days/week and 2 tablets (100mg) two days/week. Take on Days 1-14. 33 tablet 0     ondansetron (ZOFRAN) 4 MG tablet Take 2 tablets (8 mg) by mouth every 6 hours as needed (nausea / vomiting) 30 tablet 1     ondansetron (ZOFRAN) 8 MG tablet Take 1 tablet (8 mg) by mouth every 6 hours as needed for nausea 30 tablet 1     oxyCODONE (ROXICODONE) 5 MG tablet Take 1 tablet (5 mg) by mouth every 6 hours as needed for moderate to severe pain 10 tablet 0     scopolamine (TRANSDERM) 1 MG/3DAYS 72 hr patch Place 1 patch onto the skin every 72 hours 10 patch 0     sulfamethoxazole-trimethoprim (BACTRIM DS/SEPTRA DS) 800-160 MG tablet Take 1 tablet by mouth Every Mon, Tues two times daily 16 tablet 0     Vitamin D, Cholecalciferol, 1000 units TABS Take 2 tablets (2,000 Units) by mouth daily 60 tablet 3     pantoprazole (PROTONIX) 40 MG EC tablet Take 1 tablet (40 mg) by mouth daily . This acid blocker helps prevent stomach pain while on your decadron chemotherapy. (Patient not taking: Reported on 10/14/2019) 30 tablet 0       On physical examination today, /64 (BP Location: Left arm, Patient Position: Chair, Cuff Size: Adult Regular)   Pulse 107   Resp 20   Ht 1.845 m (6' 0.64\")   Wt 75.1 kg (165 lb 9.1 oz)   SpO2 97%   BMI 22.06 kg/m     HEENT exam is unremarkable with no dysmorphic features.  There is mild edema of face.  Moist mucous membranes. Conjunctiva are clear.  Lungs are clear to " auscultation with equal aeration throughout. There are no wheezes, crackles or retractions.  Cardiac exam with normal S1 and physiologic splitting of S2, no rubs, click or gallop. There is no murmur present.  Abdomen is soft, non-tender and non-distended.  Liver is palpable at the [facility].  Extremities are warm and well perfused with symmetric upper and lower extremity pulses.  Cap refill is 2 seconds.  Skin is without rash.     EKG from 9/12/2019 which I have reviewed demonstrated sinus bradycardia with right bundle branch block.     Echocardiogram today which I have reviewed demonstrated:  Patient undergoing chemotherapy. Large anterior mediastinal mass, previously causing compression of cardiac structures. No compression of the left atrium, unobstructed flow across the right pulmonarry artery. There is mild flow  acceleration in the left pulmonary artery. The superior vena cava was not well seen on this study. Normal right and left ventricular size and systolic function. Physiologic amount of pericardial fluid is seen.    In summary, Lazaro is a 19 year old male with recent diagnosis of T-cell lymphoblastic lymphoma.  At the time of his diagnosis his echocardiogram demonstrated a small to moderate pericardial effusion with tamponade physiology and compression of cardiac structures secondary to the mediastinal mass.  The pericardial effusion as well as compression of the cardiac structures improved following initiation of treatment and drainage of a large left pleural effusion.  Echocardiogram today demonstrated no compression of the cardiac structures and no pericardial effusion.  The pericardial effusion with tamponade physiology was due to the presence of a large anterior mediastinal mass.  It is possible for there to be recurrence of a pericardial effusion while undergoing treatment for lymphoma.  I recommend routine echocardiogram monitoring per his lymphoma treatment protocol or for clinical concerns.  The  abnormal EKG may also be due to altered heart position and compression secondary to the mass at the time of the study.  He has normal cardiac anatomy by echocardiogram.  He does not need ongoing cardiology follow-up but I would be happy to see him in the future should any concerns arise.     Thank you for allowing me to participate in Lazaro's care.  Please do not hesitate to contact me with any questions or concerns.      LIST OF DIAGNOSES:  1. T-cell lymphoblastic lymphoma  2. History of pericardial effusion - small to moderate with tamponade physiology   - resolved with treatment of lymphoma      Most Sincerely,     Savanah Butler MD  Pediatric Cardiologist

## 2019-10-16 NOTE — PROGRESS NOTES
[facility]  PEDIATRIC HEMATOLOGY/ONCOLOGY   SOCIAL WORK PROGRESS NOTE      DATA:     Lazaro is a 18 year old male with newly diagnosed T-Cell Lymphoblastic Lymphoma. He presents to clinic infusion on this date to begin Day 1 of consolidation per protocol [id number]. HONEY met supportively with Lazaro during his infusion. Lazaro was recently approved for Medical Assistance and has been receiving. He explained that it looks like it only backdated to 9/1/19. He did note of the application he needed it to backdate 90 days. He is hoping that this can be adjusted. HONEY e-mailed financial counseling to inquire about this. Awaiting response at present time. He asked how to have his bills re-submitted to medical assistance. HONEY explained he should call the billing office number of the statements from  and [facility] and provide his new [place] number and request they re-bill all outstanding bills. He will call them on Friday. Lazaro shared that he is feeling well for the most part. He is enjoying being at his Moms as it is more quiet and he feels well supported by Mom and Grandmother (who also lives with Mom). He is hoping to hear from social security later this month regarding approval or denial. He is still working on his health care directive as previously provided. SW will follow-up with him next visit to check-in. He has SW contact info should immediate needs/concerns arise between appointments.     INTERVENTION:     1. Supportive counseling. Check-in.  2. Discussion re: Medical Assistance and getting medical bills re-submitted to [place] for coverage.   3. Follow-up re: Health Care Directive.     ASSESSMENT:     Lazaro appears to be doing well. He is coping with his treatment within normal limits. He feels his mood is stable. He has adequate familial and social support. He is open to and appreciative of ongoing therapeutic support, advocacy, and connection with resources.     PLAN:     E-mailed  financial counseling re: backdating of Medical Assistance. Social work will continue to assess needs and provide ongoing psychosocial support and access to resources.      CHEY Davis, LICHONEY, OSW-C  Clinical    Pediatric Hematology Oncology   [facility]   Monday-Thursday   Phone: [phone]  Pager: [phone]    NO LETTER

## 2019-10-17 ENCOUNTER — OFFICE VISIT (OUTPATIENT)
Dept: PEDIATRIC HEMATOLOGY/ONCOLOGY | Facility: CLINIC | Age: 19
End: 2019-10-17
Attending: PEDIATRICS
Payer: COMMERCIAL

## 2019-10-17 ENCOUNTER — HOSPITAL ENCOUNTER (OUTPATIENT)
Facility: CLINIC | Age: 19
Discharge: HOME | End: 2019-10-17
Attending: PEDIATRICS | Admitting: PEDIATRICS
Payer: COMMERCIAL

## 2019-10-17 ENCOUNTER — HOSPITAL ENCOUNTER (OUTPATIENT)
Dept: ULTRASOUND IMAGING | Facility: CLINIC | Age: 19
End: 2019-10-17
Attending: NURSE PRACTITIONER | Admitting: PEDIATRICS
Payer: COMMERCIAL

## 2019-10-17 ENCOUNTER — ANESTHESIA EVENT (OUTPATIENT)
Dept: PEDIATRICS | Facility: CLINIC | Age: 19
End: 2019-10-17
Payer: COMMERCIAL

## 2019-10-17 ENCOUNTER — INFUSION THERAPY VISIT (OUTPATIENT)
Dept: INFUSION THERAPY | Facility: CLINIC | Age: 19
End: 2019-10-17
Attending: PEDIATRICS
Payer: COMMERCIAL

## 2019-10-17 ENCOUNTER — ANESTHESIA (OUTPATIENT)
Dept: PEDIATRICS | Facility: CLINIC | Age: 19
End: 2019-10-17
Payer: COMMERCIAL

## 2019-10-17 VITALS
SYSTOLIC BLOOD PRESSURE: 110 MMHG | DIASTOLIC BLOOD PRESSURE: 54 MMHG | RESPIRATION RATE: 16 BRPM | HEART RATE: 73 BPM | OXYGEN SATURATION: 98 % | TEMPERATURE: 97.8 F

## 2019-10-17 VITALS
RESPIRATION RATE: 16 BRPM | HEART RATE: 75 BPM | BODY MASS INDEX: 22.47 KG/M2 | HEIGHT: 73 IN | DIASTOLIC BLOOD PRESSURE: 83 MMHG | OXYGEN SATURATION: 97 % | WEIGHT: 169.53 LBS | SYSTOLIC BLOOD PRESSURE: 124 MMHG | TEMPERATURE: 97.5 F

## 2019-10-17 DIAGNOSIS — C83.50 T LYMPHOBLASTIC LYMPHOMA (H): ICD-10-CM

## 2019-10-17 DIAGNOSIS — I82.623 ACUTE DEEP VEIN THROMBOSIS (DVT) OF OTHER VEIN OF BOTH UPPER EXTREMITIES (H): ICD-10-CM

## 2019-10-17 DIAGNOSIS — L73.9 FOLLICULITIS: Primary | ICD-10-CM

## 2019-10-17 DIAGNOSIS — C83.50 T LYMPHOBLASTIC LYMPHOMA (H): Primary | ICD-10-CM

## 2019-10-17 DIAGNOSIS — E55.9 VITAMIN D DEFICIENCY: ICD-10-CM

## 2019-10-17 DIAGNOSIS — E24.2 CUSHINGOID SIDE EFFECT OF STEROIDS (H): ICD-10-CM

## 2019-10-17 DIAGNOSIS — R60.0 EDEMA OF UPPER EXTREMITY: ICD-10-CM

## 2019-10-17 DIAGNOSIS — Z79.01 ANTICOAGULATED: ICD-10-CM

## 2019-10-17 LAB
BASOPHILS # BLD AUTO: 0 10E9/L (ref 0–0.2)
BASOPHILS NFR BLD AUTO: 1.2 %
DIFFERENTIAL METHOD BLD: ABNORMAL
EOSINOPHIL # BLD AUTO: 0 10E9/L (ref 0–0.7)
EOSINOPHIL NFR BLD AUTO: 1.8 %
ERYTHROCYTE [DISTWIDTH] IN BLOOD BY AUTOMATED COUNT: 15.2 % (ref 10–15)
HCT VFR BLD AUTO: 29.5 % (ref 40–53)
HGB BLD-MCNC: 9.4 G/DL (ref 13.3–17.7)
IMM GRANULOCYTES # BLD: 0 10E9/L (ref 0–0.4)
IMM GRANULOCYTES NFR BLD: 0.6 %
LYMPHOCYTES # BLD AUTO: 0.5 10E9/L (ref 0.8–5.3)
LYMPHOCYTES NFR BLD AUTO: 28.4 %
MCH RBC QN AUTO: 26.6 PG (ref 26.5–33)
MCHC RBC AUTO-ENTMCNC: 31.9 G/DL (ref 31.5–36.5)
MCV RBC AUTO: 84 FL (ref 78–100)
MONOCYTES # BLD AUTO: 0.1 10E9/L (ref 0–1.3)
MONOCYTES NFR BLD AUTO: 7.7 %
NEUTROPHILS # BLD AUTO: 1 10E9/L (ref 1.6–8.3)
NEUTROPHILS NFR BLD AUTO: 60.3 %
NRBC # BLD AUTO: 0 10*3/UL
NRBC BLD AUTO-RTO: 0 /100
PLATELET # BLD AUTO: 361 10E9/L (ref 150–450)
RBC # BLD AUTO: 3.53 10E12/L (ref 4.4–5.9)
WBC # BLD AUTO: 1.7 10E9/L (ref 4–11)

## 2019-10-17 PROCEDURE — 25000125 ZZHC RX 250: Performed by: PEDIATRICS

## 2019-10-17 PROCEDURE — 85025 COMPLETE CBC W/AUTO DIFF WBC: CPT | Performed by: PEDIATRICS

## 2019-10-17 PROCEDURE — 25000132 ZZH RX MED GY IP 250 OP 250 PS 637: Performed by: ANESTHESIOLOGY

## 2019-10-17 PROCEDURE — 96375 TX/PRO/DX INJ NEW DRUG ADDON: CPT

## 2019-10-17 PROCEDURE — 96450 CHEMOTHERAPY INTO CNS: CPT | Performed by: PEDIATRICS

## 2019-10-17 PROCEDURE — 40000165 ZZH STATISTIC POST-PROCEDURE RECOVERY CARE: Performed by: PEDIATRICS

## 2019-10-17 PROCEDURE — 25000128 H RX IP 250 OP 636: Mod: ZF | Performed by: PEDIATRICS

## 2019-10-17 PROCEDURE — 25000128 H RX IP 250 OP 636: Performed by: NURSE ANESTHETIST, CERTIFIED REGISTERED

## 2019-10-17 PROCEDURE — 25000128 H RX IP 250 OP 636: Mod: JW | Performed by: PEDIATRICS

## 2019-10-17 PROCEDURE — 25800030 ZZH RX IP 258 OP 636: Performed by: PEDIATRICS

## 2019-10-17 PROCEDURE — 96366 THER/PROPH/DIAG IV INF ADDON: CPT | Performed by: PEDIATRICS

## 2019-10-17 PROCEDURE — 96365 THER/PROPH/DIAG IV INF INIT: CPT | Mod: 59 | Performed by: PEDIATRICS

## 2019-10-17 PROCEDURE — 25000125 ZZHC RX 250: Performed by: NURSE ANESTHETIST, CERTIFIED REGISTERED

## 2019-10-17 PROCEDURE — 25000128 H RX IP 250 OP 636: Mod: ZF

## 2019-10-17 PROCEDURE — 96409 CHEMO IV PUSH SNGL DRUG: CPT

## 2019-10-17 PROCEDURE — 89050 BODY FLUID CELL COUNT: CPT | Performed by: PEDIATRICS

## 2019-10-17 PROCEDURE — 40001011 ZZH STATISTIC PRE-PROCEDURE NURSING ASSESSMENT: Performed by: PEDIATRICS

## 2019-10-17 PROCEDURE — 37000008 ZZH ANESTHESIA TECHNICAL FEE, 1ST 30 MIN: Performed by: PEDIATRICS

## 2019-10-17 PROCEDURE — 36592 COLLECT BLOOD FROM PICC: CPT | Performed by: PEDIATRICS

## 2019-10-17 PROCEDURE — 25800030 ZZH RX IP 258 OP 636: Performed by: NURSE ANESTHETIST, CERTIFIED REGISTERED

## 2019-10-17 PROCEDURE — 93970 EXTREMITY STUDY: CPT

## 2019-10-17 RX ORDER — PROPOFOL 10 MG/ML
INJECTION, EMULSION INTRAVENOUS PRN
Status: DISCONTINUED | OUTPATIENT
Start: 2019-10-17 | End: 2019-10-17

## 2019-10-17 RX ORDER — CYTARABINE 100 MG/ML
75 INJECTION, SOLUTION INTRATHECAL; INTRAVENOUS; SUBCUTANEOUS DAILY
Qty: 4.5 ML | Refills: 0 | Status: ON HOLD | OUTPATIENT
Start: 2019-10-18 | End: 2019-10-31

## 2019-10-17 RX ORDER — LIDOCAINE 40 MG/G
2.5 CREAM TOPICAL
Status: DISCONTINUED | OUTPATIENT
Start: 2019-10-17 | End: 2019-10-17 | Stop reason: HOSPADM

## 2019-10-17 RX ORDER — ACETAMINOPHEN 325 MG/1
650 TABLET ORAL EVERY 4 HOURS PRN
Status: DISCONTINUED | OUTPATIENT
Start: 2019-10-17 | End: 2019-10-17 | Stop reason: HOSPADM

## 2019-10-17 RX ORDER — ONDANSETRON 2 MG/ML
INJECTION INTRAMUSCULAR; INTRAVENOUS PRN
Status: DISCONTINUED | OUTPATIENT
Start: 2019-10-17 | End: 2019-10-17

## 2019-10-17 RX ORDER — LIDOCAINE HYDROCHLORIDE 20 MG/ML
INJECTION, SOLUTION INFILTRATION; PERINEURAL PRN
Status: DISCONTINUED | OUTPATIENT
Start: 2019-10-17 | End: 2019-10-17

## 2019-10-17 RX ORDER — ACETAMINOPHEN 325 MG/1
TABLET ORAL
Status: DISCONTINUED
Start: 2019-10-17 | End: 2019-10-17 | Stop reason: HOSPADM

## 2019-10-17 RX ORDER — ONDANSETRON 2 MG/ML
8 INJECTION INTRAMUSCULAR; INTRAVENOUS ONCE
Status: COMPLETED | OUTPATIENT
Start: 2019-10-17 | End: 2019-10-17

## 2019-10-17 RX ORDER — PROPOFOL 10 MG/ML
INJECTION, EMULSION INTRAVENOUS CONTINUOUS PRN
Status: DISCONTINUED | OUTPATIENT
Start: 2019-10-17 | End: 2019-10-17

## 2019-10-17 RX ORDER — LIDOCAINE 40 MG/G
CREAM TOPICAL
Status: DISCONTINUED
Start: 2019-10-17 | End: 2019-10-17 | Stop reason: HOSPADM

## 2019-10-17 RX ORDER — ONDANSETRON 2 MG/ML
INJECTION INTRAMUSCULAR; INTRAVENOUS
Status: COMPLETED
Start: 2019-10-17 | End: 2019-10-17

## 2019-10-17 RX ORDER — SODIUM CHLORIDE, SODIUM LACTATE, POTASSIUM CHLORIDE, CALCIUM CHLORIDE 600; 310; 30; 20 MG/100ML; MG/100ML; MG/100ML; MG/100ML
INJECTION, SOLUTION INTRAVENOUS CONTINUOUS PRN
Status: DISCONTINUED | OUTPATIENT
Start: 2019-10-17 | End: 2019-10-17

## 2019-10-17 RX ORDER — HEPARIN SODIUM,PORCINE 10 UNIT/ML
5 VIAL (ML) INTRAVENOUS ONCE
Status: DISCONTINUED | OUTPATIENT
Start: 2019-10-17 | End: 2019-10-17 | Stop reason: HOSPADM

## 2019-10-17 RX ORDER — CYTARABINE 20 MG/ML
75 INJECTION, SOLUTION INTRATHECAL; INTRAVENOUS; SUBCUTANEOUS ONCE
Status: COMPLETED | OUTPATIENT
Start: 2019-10-17 | End: 2019-10-17

## 2019-10-17 RX ADMIN — ONDANSETRON 8 MG: 2 INJECTION INTRAMUSCULAR; INTRAVENOUS at 14:00

## 2019-10-17 RX ADMIN — ONDANSETRON 4 MG: 2 INJECTION INTRAMUSCULAR; INTRAVENOUS at 12:00

## 2019-10-17 RX ADMIN — LIDOCAINE HYDROCHLORIDE 60 MG: 20 INJECTION, SOLUTION INFILTRATION; PERINEURAL at 11:54

## 2019-10-17 RX ADMIN — PROPOFOL 100 MG: 10 INJECTION, EMULSION INTRAVENOUS at 11:54

## 2019-10-17 RX ADMIN — PROPOFOL 75 MG: 10 INJECTION, EMULSION INTRAVENOUS at 11:56

## 2019-10-17 RX ADMIN — CAFFEINE AND SODIUM BENZOATE 500 MG: 125 INJECTION, SOLUTION INTRAMUSCULAR; INTRAVENOUS at 12:06

## 2019-10-17 RX ADMIN — PROPOFOL 300 MCG/KG/MIN: 10 INJECTION, EMULSION INTRAVENOUS at 11:54

## 2019-10-17 RX ADMIN — SODIUM CHLORIDE, POTASSIUM CHLORIDE, SODIUM LACTATE AND CALCIUM CHLORIDE: 600; 310; 30; 20 INJECTION, SOLUTION INTRAVENOUS at 11:51

## 2019-10-17 RX ADMIN — PROPOFOL 75 MG: 10 INJECTION, EMULSION INTRAVENOUS at 11:55

## 2019-10-17 RX ADMIN — CYTARABINE 149 MG: 20 INJECTION, SOLUTION INTRATHECAL; INTRAVENOUS; SUBCUTANEOUS at 14:16

## 2019-10-17 RX ADMIN — ACETAMINOPHEN 650 MG: 325 TABLET, FILM COATED ORAL at 13:38

## 2019-10-17 RX ADMIN — PROPOFOL 50 MG: 10 INJECTION, EMULSION INTRAVENOUS at 11:57

## 2019-10-17 ASSESSMENT — MIFFLIN-ST. JEOR: SCORE: 1821.49

## 2019-10-17 NOTE — PROGRESS NOTES
Infusion Nursing Note    Lazaro Lund Presents to [facility] infusion center today for:Chemotherapy - Cycle 1 Day 8 Cytarabine.    Due to : T lymphoblastic lymphoma (H)    Patient seen by Provider : Yes: Dr. Fischer - LP in Peds Sedation prior to apt in [facility].    Note: Pt arrived to [facility] after LP/IT chemo in Peds Sedation. Pt denies any recent fever/infections. PIV was placed in Peds Sedation; site WDL. Cytarabine given IV Push; positive blood return noted pre/post. PIV removed without difficulty. VSS. Stable pt left clinic at completion of cares.     Intravenous Access: Yes: PIV placed in Peds Sedation    Peripheral IV - in left wrist    Treatment conditions:     Parameters Met for treatment    Pre-Meds:Yes: Zofran given IV push once arrival to clinic, prior to chemotherapy    Education provided: Yes: Plan of Care    Post Infusion Assessment: Patient tolerated infusion, Blood return noted pre and post infusion, Vital signs remained stable throughout and PIV removed without issue    Discharge Plan:   Prescription refills given for  requested meds  Patient verbalized understanding of discharge instructions, all questions answered. Patient left clinic accompanied by Self; mom picking pt up in lobby per pt.

## 2019-10-17 NOTE — OR NURSING
Report called to [facility], SUJATHA Mcknight. Pt states his mom will be picking him up from [facility] after his appt. SUJATHA Mcknight states she understands that pt cannot leave the hospital without a ride, as he has been sedated today.

## 2019-10-17 NOTE — LETTER
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 He is currently day 8 of consolidation. With induction, he had a CRu, resolution of lymphadenopathy, however a small pleural effusion persists. Induction was complicated by development of extensive upper DVT complicated by edema and folliculitis preventing day 29 LP, which will be made up on day 29 of consolidation.     Patient Active Problem List   Diagnosis     T lymphoblastic lymphoma (H)     DVT (deep venous thrombosis) (H)     Folliculitis     Cushingoid side effect of steroids (H)     Edema of upper extremity     Anticoagulated     Vitamin D deficiency     Plan:   1. LP with IT methotrexate today  2. Continue lovenox at current dose, level in goal range of 0.6-1.  Confirmed that he held last night and this morning's doses before LP. He was instructed to resume his dosing tomorrow morning (~24 hours after LP).  Will maintain platelet count > 30K while on anticoagulation.   3. Follow up on U/S today. Factor V leiden and prothrombin mutations absent. He has seen PT for post-phlebitic syndrome.   1. Lazaro is interested in a femoral port option, but on review of latest imaging with IR, it looks like he may still be able to have a chest port placed. This will be done with his next LP on 10/24  4. Continue Vit D 3 2000IU daily, recheck level in early December  5. Given history of significant spinal headache, IV caffeine given with all subsequent LPs.  6. Induction Day 29 LP deferred, will make up on Day 29 of Consolidation.    7. Plan to repeat CT at the end of Consolidation, if significant pleural effusion persists at that time we will have it tapped to see if malignant fluid persists.  8. Fluconazole and  keflex treatments complete for oral candidiasis and folliculitis  9. RTC in 1 week for day 15 of consolidation including LP.   10. Reviewed supportive management of nausea, constipation, swelling, or fever    Signed,  Nicho Fischer   Pediatric Hematology/Oncology Fellow    Physician Attestation    I saw and evaluated the patient. I discussed with the fellow and agree with the findings and plan as documented in the fellow's note.     Reynaldo Campuzano MD  Pediatric Hematology/Oncology  [facility]      Primary Care Physician   Rodriguez Montoya    Chief Complaint   Consolidation therapy follow up  History is obtained from the patient    History of Present Illness   Lazaro Lund is a 19 year old young man T-cell lymphoblastic lymphoma, pratt stage III. Lazaro presented with chronic cough with progressive orthopnea and was found to have an anterior mediastinal mass and malignant left-sided pleural effusion.  A CT guided biopsy was obtained at [facility] and pathology was consistent with T-cell neoplasm.  He was admitted to [facility] oncology service 8/30 and started on treatment per [id number].  He had a pleural effusion that required a chest tube and a pericardial effusion that was not drained. His Induction was complicated by cardiac compression secondary to his mass leading to tamponade, this improved through out his hospital stay.  He also had dysphagia that improved with treatment of his mass, swallow study was normal. His induction course was complicated by extensive bilateral UE DVTs (likely aggravated by his PICC line as he was not a candidate for CVC at presentation and also PEG induced coagulopathy) for which anticoagulation was started. His Day 29 LP was cancelled due to folliculitis overlying his lumbar spine.    Interim History  Lazaro underwent his day 1 LP and chemotherapy last week to start consolidation. He tolerated this well. He has had minimal nausea and no  headache over the past week. His right arm swelling has improved significantly. His folliculitis has not resolved but is less inflamed. He denies falls or weakness, sensory changes, constipation, decreased appetite, or shortness of breath. He would like to have a port placed if this is an option for him, as he requires repeated attempts at peripheral access.     Past Medical History    I have reviewed this patient's medical history and updated it with pertinent information if needed.   Past Medical History:   Diagnosis Date     DVT of upper extremity (deep vein thrombosis) (H) 09/26/2019    Bilateral      Migraine 2006    have resolved     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       Past Surgical History   I have reviewed this patient's surgical history and updated it with pertinent information if needed.  Past Surgical History:   Procedure Laterality Date     BONE MARROW BIOPSY, BONE SPECIMEN, NEEDLE/TROCAR Left 9/1/2019    Procedure: BIOPSY, BONE MARROW;  Surgeon: Heather Lopez MD;  [facility]     INSERT PICC LINE N/A 8/31/2019    Procedure: INSERTION, PICC;  Surgeon: Michell Keith MD;  [facility]     IR CHEST TUBE PLACEMENT NON-TUNNELLED LEFT  8/31/2019     IR PICC PLACEMENT > 5 YRS OF AGE  8/31/2019     SPINAL PUNCTURE,LUMBAR, INTRATHECAL CHEMO DELIVERY N/A 8/31/2019    Procedure: LUMBAR PUNCTURE, WITH INTRATHECAL CHEMOTHERAPY ADMINISTRATION;  Surgeon: Heather Lopez MD;  [facility]     SPINAL PUNCTURE,LUMBAR, INTRATHECAL CHEMO DELIVERY N/A 9/9/2019    Procedure: Lumbar Puncture With Intrathecal Chemo;  Surgeon: Alexi Hayes MD;  [facility]     SPINAL PUNCTURE,LUMBAR, INTRATHECAL CHEMO DELIVERY N/A 10/10/2019    Procedure: Lumbar puncture with IT Chemo (CD);  Surgeon: Reynaldo Campuzano MD;  [facility] PEDS SEDATION      THORACENTESIS N/A 8/31/2019    Procedure: Thoracentesis;  Surgeon: Michell Keith MD;  [facility] OR       Immunization History    Immunization Status:  up to date and documented    Prior to Admission Medications    Current Outpatient Medications on File Prior to Visit   Medication Sig Dispense Refill     cytarabine, PF, (CYTOSAR) 100 MG/ML injection CHEMO Inject 1.5 mLs (150 mg) Subcutaneous daily for 3 days Start day after clinic dose. 4.5 mL 0     diphenhydrAMINE (BENADRYL) 25 MG capsule Take 1-2 capsules (25-50 mg) by mouth every 6 hours as needed (Breakthrough Nausea and Vomiting ) 30 capsule 1     melatonin 3 MG tablet Take 1 tablet (3 mg) by mouth At Bedtime 30 tablet 1     mercaptopurine (PURINETHOL) 50 MG tablet CHEMO Take 2.5 tablets (125mg) five days/week and 2 tablets (100mg) two days/week. Take on Days 1-14. 33 tablet 0     ondansetron (ZOFRAN) 4 MG tablet Take 2 tablets (8 mg) by mouth every 6 hours as needed (nausea / vomiting) 30 tablet 1     ondansetron (ZOFRAN) 8 MG tablet Take 1 tablet (8 mg) by mouth every 6 hours as needed for nausea 30 tablet 1     [] polyethylene glycol (MIRALAX/GLYCOLAX) packet Take 17 g by mouth daily (Patient taking differently: Take 17 g by mouth daily as needed ) 30 packet 0     scopolamine (TRANSDERM) 1 MG/3DAYS 72 hr patch Place 1 patch onto the skin every 72 hours 10 patch 0     [] sennosides (SENOKOT) 8.6 MG tablet Take 2 tablets by mouth 2 times daily as needed for constipation 60 each 1     sulfamethoxazole-trimethoprim (BACTRIM DS/SEPTRA DS) 800-160 MG tablet Take 1 tablet by mouth Every Mon, Tues two times daily 16 tablet 0     Vitamin D, Cholecalciferol, 1000 units TABS Take 2 tablets (2,000 Units) by mouth daily 60 tablet 3       Allergies   Allergies   Allergen Reactions     No Known Drug Allergies        Social History   I have updated and reviewed the following Social History Narrative:   Pediatric History   Patient Guardians     Not on file     Patient does not qualify to have social determinant information on file (likely too young).   Other Topics Concern     Not on  file   Social History Narrative    Lives at home in [place] with Dad and Step-Mom. Biological mother is involved in his life and accompanied him during this hospitalization. Has 4 step-siblings and 1 biological sibling. 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 Has completed high school.         Family History   I have reviewed this patient's family history and updated it with pertinent information if needed.   Family History   Problem Relation Age of Onset     No Known Problems Mother      No Known Problems Father      Asthma Brother      Thyroid Cancer Paternal Grandmother      Melanoma Paternal Aunt        Review of Systems   The 10 point Review of Systems is negative other than noted in the HPI or here.     Physical Exam                      Vital Signs with Ranges  Temp:  [97.6  F (36.4  C)-98.2  F (36.8  C)] 98.2  F (36.8  C)  Pulse:  [67-84] 68  Heart Rate:  [68] 68  Resp:  [15-16] 15  BP: ()/(45-68) 95/45  SpO2:  [98 %-99 %] 99 %    Wt Readings from Last 2 Encounters:   10/17/19 76.9 kg (169 lb 8.5 oz)   10/14/19 75.1 kg (165 lb 9.1 oz)       General: alert, interactive and appropriate. NAD.    HEENT: Skull is atrauamatic and normocephalic.  The right side of his face is noticeably full, no venous distention.  Full head of hair. PERRLA, sclera are non icteric and not injected, EOM are intact. Nares are patent without drainage. Oropharynx clear, no plaques noted. Buccal mucosa and tongue clear. MMM.   Lymph:  Neck is supple without lymphadenopathy.  There is no supraclavicular or axillary ymphadenopathy palpated.  Cardiovascular:  HR is regular, S1, S2 no murmur.  Capillary refill is < 2 seconds. Right arm edema resolving. PICC insertion site without warmth, erythema or drainage.  Cap refill < 2 sec. Peripheral pulses 2+/=.   Respiratory: Respirations are easy.  Lungs are clear to auscultation  through out.  No crackles or wheezes.  Gastrointestinal:  BS present in all quadrants.  Abdomen is soft and non-tender.  No hepatosplenomegaly or masses are palpated.  Skin:  Resolving diffuse truncal and upper extremity papular non-blanching erythematous rash, no pustules noted.  No vesicular lesions.   Neurological:  CN 2-12 grossly intact. Gait is normal.  No issues with balance. Sensation intact in hands and feet.     Musculoskeletal:  Good strength and ROM in all extremities.  Strong dorsiflexion at ankles and great toes (5/5) bilaterally without any pain at the Achilles.    Data   Results for orders placed or performed during the hospital encounter of 10/17/19 (from the past 24 hour(s))   CBC with platelets differential   Result Value Ref Range    WBC 1.7 (L) 4.0 - 11.0 10e9/L    RBC Count 3.53 (L) 4.4 - 5.9 10e12/L    Hemoglobin 9.4 (L) 13.3 - 17.7 g/dL    Hematocrit 29.5 (L) 40.0 - 53.0 %    MCV 84 78 - 100 fl    MCH 26.6 26.5 - 33.0 pg    MCHC 31.9 31.5 - 36.5 g/dL    RDW 15.2 (H) 10.0 - 15.0 %    Platelet Count 361 150 - 450 10e9/L    Diff Method Automated Method     % Neutrophils 60.3 %    % Lymphocytes 28.4 %    % Monocytes 7.7 %    % Eosinophils 1.8 %    % Basophils 1.2 %    % Immature Granulocytes 0.6 %    Nucleated RBCs 0 0 /100    Absolute Neutrophil 1.0 (L) 1.6 - 8.3 10e9/L    Absolute Lymphocytes 0.5 (L) 0.8 - 5.3 10e9/L    Absolute Monocytes 0.1 0.0 - 1.3 10e9/L    Absolute Eosinophils 0.0 0.0 - 0.7 10e9/L    Absolute Basophils 0.0 0.0 - 0.2 10e9/L    Abs Immature Granulocytes 0.0 0 - 0.4 10e9/L    Absolute Nucleated RBC 0.0      US 10/17/19  FINDINGS:  Right: There is occlusive thrombus in the right proximal right  internal jugular vein, subclavian vein, axillary vein, medial brachial  vein, which do not compress. There is nonocclusive clot in the right  innominate vein, nearly occlusive in the proximal right basilic vein  and right median cubital vein.      Left:  There is nearly occlusive  thrombus of the left distal internal jugular  vein, innominate vein, proximal subclavian, and occlusive in the left  medial cubital vein.       Soft tissue edema of both antecubital fossae.                                                                      IMPRESSION:  1. Right axillary vein nearly occlusive thrombus is now occlusive,  likely not significantly changed from the comparison. Otherwise stable  extensive right-sided clot burden.  2. Nearly occlusive thrombus in the left distal internal jugular,  innominate, proximal subclavian, and medial cubital veins.  3. Redemonstration of soft tissue edema in the antecubital fossae.      Procedures:  A Lumbar Puncture was performed in the Pediatric Sedation Suite. Informed consent was obtained prior to the procedure. Lazaro Lund was identified by facial recognition and ID arm band. A time-out was performed. Lazaro Lund was then placed in the left lateral decubitus position and the lumbosacral area was sterily prepped using Chloraprep followed by drape placement. Anatomic landmarks were identified by palpation. Then, a 22 gauge, 3.5 inch spinal needle was easily inserted into the L4-L5 interspace. On the first attempt approximately 3 mL of clear and colorless cerebrospinal fluid was obtained to be sent to the lab for cell count analysis and cytospin. Following that, 15 mg of intrathecal methotrexate in 6 mL of preservative-free normal saline was infused without resistance. The needle was removed and a Band-Aid applied. Lazaro Lund tolerated this procedure very well.    Physician Attestation   I was present and observed as the fellow preformed the LP with IT chemo administration.     Reynaldo Campuzano  Date of Service (when I saw the patient): 10/17/19        10/17/2019 Procedure Note    A Lumbar Puncture was performed in the Pediatric Sedation Suite. Informed consent was obtained prior to the procedure. Lazaro Lund was identified by facial  recognition and ID arm band. A time-out was performed. Lazaro Lund was then placed in the left lateral decubitus position and the lumbosacral area was sterily prepped using Chloraprep followed by drape placement. Anatomic landmarks were identified by palpation. Then, a 22 gauge, 3.5 inch spinal needle was easily inserted into the L4-L5 interspace. On the first attempt approximately 3 mL of clear and colorless cerebrospinal fluid was obtained to be sent to the lab for cell count analysis and cytospin. Following that, 15 mg of intrathecal methotrexate in 6 mL of preservative-free normal saline was infused without resistance. The needle was removed and a Band-Aid applied. Lazaro Lund tolerated this procedure very well.    Signed,  Nicho Fischer   Pediatric Hematology/Oncology Fellow    Physician Attestation    I saw and evaluated the patient and was present for the entire procedure.    Reynaldo Campuzano MD  Pediatric Hematology/Oncology  [facility]        Nicho Fischer MD

## 2019-10-17 NOTE — ANESTHESIA PREPROCEDURE EVALUATION
Anesthesia Pre-Procedure Evaluation    Patient: Lazaro Lund   MRN:     [id number] Gender:   male   Age:    19 year old :      2000        Preoperative Diagnosis: lymphoma   Procedure(s):  Lumbar puncture with IT Chemo (not CD)     Past Medical History:   Diagnosis Date     DVT of upper extremity (deep vein thrombosis) (H) 2019    Bilateral      Migraine 2006    have resolved     T lymphoblastic lymphoma (H) 2019      Past Surgical History:   Procedure Laterality Date     BONE MARROW BIOPSY, BONE SPECIMEN, NEEDLE/TROCAR Left 2019    Procedure: BIOPSY, BONE MARROW;  Surgeon: Heather Lopez MD;  [facility]     INSERT PICC LINE N/A 2019    Procedure: INSERTION, PICC;  Surgeon: Michell Keith MD;  [facility]     IR CHEST TUBE PLACEMENT NON-TUNNELLED LEFT  2019     IR PICC PLACEMENT > 5 YRS OF AGE  2019     SPINAL PUNCTURE,LUMBAR, INTRATHECAL CHEMO DELIVERY N/A 2019    Procedure: LUMBAR PUNCTURE, WITH INTRATHECAL CHEMOTHERAPY ADMINISTRATION;  Surgeon: Heather Lopez MD;  [facility]     SPINAL PUNCTURE,LUMBAR, INTRATHECAL CHEMO DELIVERY N/A 2019    Procedure: Lumbar Puncture With Intrathecal Chemo;  Surgeon: Alexi Hayes MD;  [facility]     SPINAL PUNCTURE,LUMBAR, INTRATHECAL CHEMO DELIVERY N/A 10/10/2019    Procedure: Lumbar puncture with IT Chemo (CD);  Surgeon: Reynaldo Campuzano MD;  [facility] P[facility]      THORACENTESIS N/A 2019    Procedure: Thoracentesis;  Surgeon: Michell Keith MD;  [facility]          Anesthesia Evaluation    ROS/Med Hx    History of anesthetic complications (PONV)  (-) malignant hyperthermia and tuberculosis    Cardiovascular Findings - negative ROS  (-) congenital heart disease    Neuro Findings - negative ROS    Pulmonary Findings - negative ROS    HENT Findings - negative HENT ROS    Skin Findings - negative skin ROS      GI/Hepatic/Renal Findings - negative  ROS    Endocrine/Metabolic Findings - negative ROS      Genetic/Syndrome Findings - negative genetics/syndromes ROS    Hematology/Oncology Findings   (+) cancer (T lymphoblastic lymphoma) and clotting disorder (DVT on Enoxaparin, last dose > 24 hours)            PHYSICAL EXAM:   Mental Status/Neuro: A/A/O   Airway: Facies: Feasible  Mallampati: I  Mouth/Opening: Full  TM distance: > 6 cm  Neck ROM: Full   Respiratory: Auscultation: CTAB     Resp. Rate: Normal     Resp. Effort: Normal      CV: Rhythm: Regular  Rate: Age appropriate  Heart: Normal Sounds  Edema: None   Comments:      Dental: Normal Dentition                  LABS:  CBC:   Lab Results   Component Value Date    WBC 3.6 (L) 10/10/2019    WBC 4.3 10/03/2019    HGB 10.6 (L) 10/10/2019    HGB 11.8 (L) 10/03/2019    HCT 34.2 (L) 10/10/2019    HCT 38.9 (L) 10/03/2019     10/10/2019     10/03/2019     BMP:   Lab Results   Component Value Date     10/10/2019     10/01/2019    POTASSIUM 4.1 10/10/2019    POTASSIUM 3.8 10/01/2019    CHLORIDE 108 10/10/2019    CHLORIDE 102 10/01/2019    CO2 30 10/10/2019    CO2 31 10/01/2019    BUN 15 10/10/2019    BUN 16 10/01/2019    CR 0.54 (L) 10/10/2019    CR 0.44 (L) 10/01/2019    GLC 87 10/10/2019    GLC 74 10/01/2019     COAGS:   Lab Results   Component Value Date    PTT 32 09/16/2019    INR 1.26 (H) 09/16/2019    FIBR 75 (LL) 09/16/2019     POC:   Lab Results   Component Value Date    BGM 80 08/31/2019     OTHER:   Lab Results   Component Value Date    MICHAEL 8.5 10/10/2019    PHOS 3.4 09/09/2019    MAG 2.0 09/09/2019    ALBUMIN 2.9 (L) 10/10/2019    PROTTOTAL 6.0 (L) 10/10/2019    ALT 89 (H) 10/10/2019    AST 29 10/10/2019    ALKPHOS 87 10/10/2019    BILITOTAL 0.2 10/10/2019        Preop Vitals    BP Readings from Last 3 Encounters:   10/14/19 105/64   10/10/19 107/49   10/10/19 112/73    Pulse Readings from Last 3 Encounters:   10/14/19 107   10/10/19 66   10/03/19 99      Resp Readings from Last  "3 Encounters:   10/14/19 20   10/10/19 20   10/10/19 30    SpO2 Readings from Last 3 Encounters:   10/14/19 97%   10/10/19 98%   10/10/19 98%      Temp Readings from Last 1 Encounters:   10/10/19 36.7  C (98.1  F) (Oral)    Ht Readings from Last 1 Encounters:   10/14/19 1.845 m (6' 0.64\")      Wt Readings from Last 1 Encounters:   10/14/19 75.1 kg (165 lb 9.1 oz)    Estimated body mass index is 22.06 kg/m  as calculated from the following:    Height as of 10/14/19: 1.845 m (6' 0.64\").    Weight as of 10/14/19: 75.1 kg (165 lb 9.1 oz).     LDA:        Assessment:   ASA SCORE: 2    H&P: History and physical reviewed and following examination; no interval change.   Smoking Status:  Non-Smoker/Unknown   NPO Status: NPO Appropriate     Plan:   Anes. Type:  MAC   Pre-Medication: None   Induction:  IV (Standard)   Airway: Native Airway   Access/Monitoring: PIV   Maintenance: Propofol Sedation     Postop Plan:   Postop Pain: None  Postop Sedation/Airway: Not planned     PONV Management:   Adult Risk Factors:, Non-Smoker   Prevention:, Propofol     CONSENT: Direct conversation   Plan and risks discussed with: Patient   Blood Products: Consent Deferred (Minimal Blood Loss)           Mario Angeles DO  "

## 2019-10-17 NOTE — PROGRESS NOTES
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 He is currently day 8 of consolidation. With induction, he had a CRu, resolution of lymphadenopathy, however a small pleural effusion persists. Induction was complicated by development of extensive upper DVT complicated by edema and folliculitis preventing day 29 LP, which will be made up on day 29 of consolidation.     Patient Active Problem List   Diagnosis     T lymphoblastic lymphoma (H)     DVT (deep venous thrombosis) (H)     Folliculitis     Cushingoid side effect of steroids (H)     Edema of upper extremity     Anticoagulated     Vitamin D deficiency     Plan:   1. LP with IT methotrexate today  2. Continue lovenox at current dose, level in goal range of 0.6-1.  Confirmed that he held last night and this morning's doses before LP. He was instructed to resume his dosing tomorrow morning (~24 hours after LP).  Will maintain platelet count > 30K while on anticoagulation.   3. Follow up on U/S today. Factor V leiden and prothrombin mutations absent. He has seen PT for post-phlebitic syndrome.   1. Lazaro is interested in a femoral port option, but on review of latest imaging with IR, it looks like he may still be able to have a chest port placed. This will be done with his next LP on 10/24  4. Continue Vit D 3 2000IU daily, recheck level in early December  5. Given history of significant spinal headache, IV caffeine given with all subsequent LPs.  6. Induction Day 29 LP deferred, will make up on Day 29 of Consolidation.    7. Plan to repeat CT at the end of Consolidation, if significant pleural effusion persists at that time we will have it tapped to see if malignant fluid persists.  8. Fluconazole and keflex treatments complete for oral candidiasis and folliculitis  9. RTC in 1 week  for day 15 of consolidation including LP.   10. Reviewed supportive management of nausea, constipation, swelling, or fever    Signed,  Nicho Fischer   Pediatric Hematology/Oncology Fellow    Physician Attestation    I saw and evaluated the patient. I discussed with the fellow and agree with the findings and plan as documented in the fellow's note.     Reynaldo Campuzano MD  Pediatric Hematology/Oncology  [facility]      Primary Care Physician   Rodriguez Montoya    Chief Complaint   Consolidation therapy follow up  History is obtained from the patient    History of Present Illness   Lazaro Lund is a 19 year old young man T-cell lymphoblastic lymphoma, pratt stage III. Lazaro presented with chronic cough with progressive orthopnea and was found to have an anterior mediastinal mass and malignant left-sided pleural effusion.  A CT guided biopsy was obtained at [facility] and pathology was consistent with T-cell neoplasm.  He was admitted to [facility] oncology service 8/30 and started on treatment per [id number].  He had a pleural effusion that required a chest tube and a pericardial effusion that was not drained. His Induction was complicated by cardiac compression secondary to his mass leading to tamponade, this improved through out his hospital stay.  He also had dysphagia that improved with treatment of his mass, swallow study was normal. His induction course was complicated by extensive bilateral UE DVTs (likely aggravated by his PICC line as he was not a candidate for CVC at presentation and also PEG induced coagulopathy) for which anticoagulation was started. His Day 29 LP was cancelled due to folliculitis overlying his lumbar spine.    Interim History  Lazaro underwent his day 1 LP and chemotherapy last week to start consolidation. He tolerated this well. He has had minimal nausea and no headache over the past week. His right arm swelling has improved significantly. His  folliculitis has not resolved but is less inflamed. He denies falls or weakness, sensory changes, constipation, decreased appetite, or shortness of breath. He would like to have a port placed if this is an option for him, as he requires repeated attempts at peripheral access.     Past Medical History    I have reviewed this patient's medical history and updated it with pertinent information if needed.   Past Medical History:   Diagnosis Date     DVT of upper extremity (deep vein thrombosis) (H) 09/26/2019    Bilateral      Migraine 2006    have resolved     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       Past Surgical History   I have reviewed this patient's surgical history and updated it with pertinent information if needed.  Past Surgical History:   Procedure Laterality Date     BONE MARROW BIOPSY, BONE SPECIMEN, NEEDLE/TROCAR Left 9/1/2019    Procedure: BIOPSY, BONE MARROW;  Surgeon: Heather Lopez MD;  Location: [facility] OR     INSERT PICC LINE N/A 8/31/2019    Procedure: INSERTION, PICC;  Surgeon: Michell Keith MD;  Location: [facility] OR     IR CHEST TUBE PLACEMENT NON-TUNNELLED LEFT  8/31/2019     IR PICC PLACEMENT > 5 YRS OF AGE  8/31/2019     SPINAL PUNCTURE,LUMBAR, INTRATHECAL CHEMO DELIVERY N/A 8/31/2019    Procedure: LUMBAR PUNCTURE, WITH INTRATHECAL CHEMOTHERAPY ADMINISTRATION;  Surgeon: Heather Lopez MD;  [facility] OR     SPINAL PUNCTURE,LUMBAR, INTRATHECAL CHEMO DELIVERY N/A 9/9/2019    Procedure: Lumbar Puncture With Intrathecal Chemo;  Surgeon: Alexi Hayes MD;  [facility]: [facility] OR     SPINAL PUNCTURE,LUMBAR, INTRATHECAL CHEMO DELIVERY N/A 10/10/2019    Procedure: Lumbar puncture with IT Chemo (CD);  Surgeon: Reynaldo Campuzano MD;  Location: [facility] PEDS SEDATION      THORACENTESIS N/A 8/31/2019    Procedure: Thoracentesis;  Surgeon: Michell Keith MD;  Location: [facility] OR       Immunization History   Immunization Status:  up to date and documented    Prior to Admission Medications     Current Outpatient Medications on File Prior to Visit   Medication Sig Dispense Refill     cytarabine, PF, (CYTOSAR) 100 MG/ML injection CHEMO Inject 1.5 mLs (150 mg) Subcutaneous daily for 3 days Start day after clinic dose. 4.5 mL 0     diphenhydrAMINE (BENADRYL) 25 MG capsule Take 1-2 capsules (25-50 mg) by mouth every 6 hours as needed (Breakthrough Nausea and Vomiting ) 30 capsule 1     melatonin 3 MG tablet Take 1 tablet (3 mg) by mouth At Bedtime 30 tablet 1     mercaptopurine (PURINETHOL) 50 MG tablet CHEMO Take 2.5 tablets (125mg) five days/week and 2 tablets (100mg) two days/week. Take on Days 1-14. 33 tablet 0     ondansetron (ZOFRAN) 4 MG tablet Take 2 tablets (8 mg) by mouth every 6 hours as needed (nausea / vomiting) 30 tablet 1     ondansetron (ZOFRAN) 8 MG tablet Take 1 tablet (8 mg) by mouth every 6 hours as needed for nausea 30 tablet 1     [] polyethylene glycol (MIRALAX/GLYCOLAX) packet Take 17 g by mouth daily (Patient taking differently: Take 17 g by mouth daily as needed ) 30 packet 0     scopolamine (TRANSDERM) 1 MG/3DAYS 72 hr patch Place 1 patch onto the skin every 72 hours 10 patch 0     [] sennosides (SENOKOT) 8.6 MG tablet Take 2 tablets by mouth 2 times daily as needed for constipation 60 each 1     sulfamethoxazole-trimethoprim (BACTRIM DS/SEPTRA DS) 800-160 MG tablet Take 1 tablet by mouth Every Mon, Tu two times daily 16 tablet 0     Vitamin D, Cholecalciferol, 1000 units TABS Take 2 tablets (2,000 Units) by mouth daily 60 tablet 3       Allergies   Allergies   Allergen Reactions     No Known Drug Allergies        Social History   I have updated and reviewed the following Social History Narrative:   Pediatric History   Patient Guardians     Not on file     Patient does not qualify to have social determinant information on file (likely too young).   Other Topics Concern     Not on file   Social History Narrative    Lives at home in [place] with Dad and Step-Mom.  Biological mother is involved in his life and accompanied him during this hospitalization. Has 4 step-siblings and 1 biological sibling. 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 Has completed high school.         Family History   I have reviewed this patient's family history and updated it with pertinent information if needed.   Family History   Problem Relation Age of Onset     No Known Problems Mother      No Known Problems Father      Asthma Brother      Thyroid Cancer Paternal Grandmother      Melanoma Paternal Aunt        Review of Systems   The 10 point Review of Systems is negative other than noted in the HPI or here.     Physical Exam                      Vital Signs with Ranges  Temp:  [97.6  F (36.4  C)-98.2  F (36.8  C)] 98.2  F (36.8  C)  Pulse:  [67-84] 68  Heart Rate:  [68] 68  Resp:  [15-16] 15  BP: ()/(45-68) 95/45  SpO2:  [98 %-99 %] 99 %    Wt Readings from Last 2 Encounters:   10/17/19 76.9 kg (169 lb 8.5 oz)   10/14/19 75.1 kg (165 lb 9.1 oz)       General: alert, interactive and appropriate. NAD.    HEENT: Skull is atrauamatic and normocephalic.  The right side of his face is noticeably full, no venous distention.  Full head of hair. PERRLA, sclera are non icteric and not injected, EOM are intact. Nares are patent without drainage. Oropharynx clear, no plaques noted. Buccal mucosa and tongue clear. MMM.   Lymph:  Neck is supple without lymphadenopathy.  There is no supraclavicular or axillary ymphadenopathy palpated.  Cardiovascular:  HR is regular, S1, S2 no murmur.  Capillary refill is < 2 seconds. Right arm edema resolving. PICC insertion site without warmth, erythema or drainage.  Cap refill < 2 sec. Peripheral pulses 2+/=.   Respiratory: Respirations are easy.  Lungs are clear to auscultation through out.  No crackles or wheezes.  Gastrointestinal:  BS present in all quadrants.   Abdomen is soft and non-tender.  No hepatosplenomegaly or masses are palpated.  Skin: Resolving diffuse truncal and upper extremity papular non-blanching erythematous rash, no pustules noted.  No vesicular lesions.   Neurological:  CN 2-12 grossly intact. Gait is normal.  No issues with balance. Sensation intact in hands and feet.     Musculoskeletal:  Good strength and ROM in all extremities.  Strong dorsiflexion at ankles and great toes (5/5) bilaterally without any pain at the Achilles.    Data   Results for orders placed or performed during the hospital encounter of 10/17/19 (from the past 24 hour(s))   CBC with platelets differential   Result Value Ref Range    WBC 1.7 (L) 4.0 - 11.0 10e9/L    RBC Count 3.53 (L) 4.4 - 5.9 10e12/L    Hemoglobin 9.4 (L) 13.3 - 17.7 g/dL    Hematocrit 29.5 (L) 40.0 - 53.0 %    MCV 84 78 - 100 fl    MCH 26.6 26.5 - 33.0 pg    MCHC 31.9 31.5 - 36.5 g/dL    RDW 15.2 (H) 10.0 - 15.0 %    Platelet Count 361 150 - 450 10e9/L    Diff Method Automated Method     % Neutrophils 60.3 %    % Lymphocytes 28.4 %    % Monocytes 7.7 %    % Eosinophils 1.8 %    % Basophils 1.2 %    % Immature Granulocytes 0.6 %    Nucleated RBCs 0 0 /100    Absolute Neutrophil 1.0 (L) 1.6 - 8.3 10e9/L    Absolute Lymphocytes 0.5 (L) 0.8 - 5.3 10e9/L    Absolute Monocytes 0.1 0.0 - 1.3 10e9/L    Absolute Eosinophils 0.0 0.0 - 0.7 10e9/L    Absolute Basophils 0.0 0.0 - 0.2 10e9/L    Abs Immature Granulocytes 0.0 0 - 0.4 10e9/L    Absolute Nucleated RBC 0.0      US 10/17/19  FINDINGS:  Right: There is occlusive thrombus in the right proximal right  internal jugular vein, subclavian vein, axillary vein, medial brachial  vein, which do not compress. There is nonocclusive clot in the right  innominate vein, nearly occlusive in the proximal right basilic vein  and right median cubital vein.      Left:  There is nearly occlusive thrombus of the left distal internal jugular  vein, innominate vein, proximal subclavian, and  occlusive in the left  medial cubital vein.       Soft tissue edema of both antecubital fossae.                                                                      IMPRESSION:  1. Right axillary vein nearly occlusive thrombus is now occlusive,  likely not significantly changed from the comparison. Otherwise stable  extensive right-sided clot burden.  2. Nearly occlusive thrombus in the left distal internal jugular,  innominate, proximal subclavian, and medial cubital veins.  3. Redemonstration of soft tissue edema in the antecubital fossae.      Procedures:  A Lumbar Puncture was performed in the Pediatric Sedation Suite. Informed consent was obtained prior to the procedure. Lazaro Lund was identified by facial recognition and ID arm band. A time-out was performed. Lazaro Lund was then placed in the left lateral decubitus position and the lumbosacral area was sterily prepped using Chloraprep followed by drape placement. Anatomic landmarks were identified by palpation. Then, a 22 gauge, 3.5 inch spinal needle was easily inserted into the L4-L5 interspace. On the first attempt approximately 3 mL of clear and colorless cerebrospinal fluid was obtained to be sent to the lab for cell count analysis and cytospin. Following that, 15 mg of intrathecal methotrexate in 6 mL of preservative-free normal saline was infused without resistance. The needle was removed and a Band-Aid applied. Lazaro Lund tolerated this procedure very well.    Physician Attestation   I was present and observed as the fellow preformed the LP with IT chemo administration.     Reynaldo Campuzano  Date of Service (when I saw the patient): 10/17/19

## 2019-10-17 NOTE — OR NURSING
This writer called the outpatient pharmacy to clarify where pt's Lovenox will be delivered. They plan to deliver it to [facility]. Pt notified. Will continue to monitor.

## 2019-10-17 NOTE — ANESTHESIA CARE TRANSFER NOTE
Patient: Lazaro Lund    Procedure(s):  Lumbar puncture with IT Chemo (not CD)    Diagnosis: lymphoma  Diagnosis Additional Information: No value filed.    Anesthesia Type:   MAC     Note:  Airway :Nasal Cannula  Patient transferred to: Recovery  Comments: Transfer to patient room for recovery.  Monitors placed.  VSS noted.  Report to RN.  Handoff Report: Identifed the Patient, Identified the Reponsible Provider, Reviewed the pertinent medical history, Discussed the surgical course, Reviewed Intra-OP anesthesia mangement and issues during anesthesia, Set expectations for post-procedure period and Allowed opportunity for questions and acknowledgement of understanding      Vitals: (Last set prior to Anesthesia Care Transfer)    CRNA VITALS  10/17/2019 1137 - 10/17/2019 1208      10/17/2019             NIBP:  90/50    Pulse:  84    NIBP Mean:  62    Ht Rate:  82    Temp:  36.8  C (98.2  F)    SpO2:  99 %    Resp Rate (observed):  22    EKG:  Sinus rhythm                Electronically Signed By: YOHAN JUAREZ CRNA  October 17, 2019  12:09 PM

## 2019-10-17 NOTE — ANESTHESIA POSTPROCEDURE EVALUATION
Anesthesia POST Procedure Evaluation    Patient: Lazaro Lund   MRN:     [id number] Gender:   male   Age:    19 year old :      2000        Preoperative Diagnosis: lymphoma   Procedure(s):  Lumbar puncture with IT Chemo (not CD)   Postop Comments: No value filed.       Anesthesia Type:  Not documented  MAC    Reportable Event: NO     PAIN: Uncomplicated   Sign Out status: Comfortable, Well controlled pain     PONV: No PONV   Sign Out status:  No Nausea or Vomiting     Neuro/Psych: Uneventful perioperative course   Sign Out Status: Preoperative baseline; Age appropriate mentation     Airway/Resp.: Uneventful perioperative course   Sign Out Status: Non labored breathing, age appropriate RR; Resp. Status within EXPECTED Parameters     CV: Uneventful perioperative course   Sign Out status: Appropriate BP and perfusion indices; Appropriate HR/Rhythm     Disposition:   Sign Out in:  Peds sedation  Disposition:  Home  Recovery Course: Uneventful  Follow-Up: Not required           Last Anesthesia Record Vitals:  CRNA VITALS  10/17/2019 1137 - 10/17/2019 1237      10/17/2019             NIBP:  90/50    Pulse:  84    NIBP Mean:  62    Ht Rate:  82    Temp:  36.8  C (98.2  F)    SpO2:  99 %    Resp Rate (observed):  22    EKG:  Sinus rhythm          Last PACU Vitals:  Vitals Value Taken Time   /57 10/17/2019 12:35 PM   Temp 36.4  C (97.5  F) 10/17/2019 12:35 PM   Pulse 68 10/17/2019 12:35 PM   Resp 16 10/17/2019 12:35 PM   SpO2 97 % 10/17/2019 12:35 PM   Temp src     NIBP     Pulse     SpO2     Resp     Temp     Ht Rate     Temp 2           Electronically Signed By: Mario Angeles DO, 2019, 3:10 PM

## 2019-10-17 NOTE — PROGRESS NOTES
10/17/2019 Procedure Note    A Lumbar Puncture was performed in the Pediatric Sedation Suite. Informed consent was obtained prior to the procedure. Lazaro Lund was identified by facial recognition and ID arm band. A time-out was performed. Lazaro Lund was then placed in the left lateral decubitus position and the lumbosacral area was sterily prepped using Chloraprep followed by drape placement. Anatomic landmarks were identified by palpation. Then, a 22 gauge, 3.5 inch spinal needle was easily inserted into the L4-L5 interspace. On the first attempt approximately 3 mL of clear and colorless cerebrospinal fluid was obtained to be sent to the lab for cell count analysis and cytospin. Following that, 15 mg of intrathecal methotrexate in 6 mL of preservative-free normal saline was infused without resistance. The needle was removed and a Band-Aid applied. Lazaro Lund tolerated this procedure very well.    Signed,  Nicho Fischer   Pediatric Hematology/Oncology Fellow    Physician Attestation    I saw and evaluated the patient and was present for the entire procedure.    Reynaldo Campuzano MD  Pediatric Hematology/Oncology  [facility]

## 2019-10-17 NOTE — DISCHARGE INSTRUCTIONS
Care post Lumbar Puncture     Do not remove bandage/dressing for 24 hours -- after this time they can be removed    No bath, shower or soaking of the dressing for 24 hours    Activity as tolerated by the patient    Diet as able to tolerated    May use Tylenol as needed for pain control -- DO NOT use Ibuprofen    Can apply icepack to the site for discomfort -- no more than 10 minutes at a time    If bleeding presents apply pressure for 5 minutes    Call [phone] ask for Peds BMT/Hem/Onc fellow on call if complications arise including:    persistent bleeding    fever greater than 100.5    Pain    Lumbar punctures can cause headache. If the pain is not controlled with Tylenol (acetaminophen) please call the Peds BMT/Hem/Onc fellow on call    Home Instructions for Your Child after Sedation  Today your child received (medicine):  Propofol, Zofran and Caffeine  Please keep this form with your health records  Your child may be more sleepy and irritable today than normal. An adult should stay with your child for the rest of the day. The medicine may make the child dizzy. Avoid activities that require balance (bike riding, skating, climbing stairs, walking).  Remember:    When your child wants to eat again, start with liquids (juice, soda pop, Popsicles). If your child feels well enough, you may try a regular diet. It is best to offer light meals for the first 24 hours.    If your child has nausea (feels sick to the stomach) or vomiting (throws up), give small amounts of clear liquids (7-Up, Sprite, apple juice or broth). Fluids are more important than food until your child is feeling better.    Wait 24 hours before giving medicine that contains alcohol. This includes liquid cold, cough and allergy medicines (Robitussin, Vicks Formula 44 for children, Benadryl, Chlor-Trimeton).    If you will leave your child with a , give the sitter a copy of these instructions.  Call your doctor if:    You have questions  about the test results.    Your child vomits (throws up) more than two times.    Your child is very fussy or irritable.    You have trouble waking your child.     If your child has trouble breathing, call [phone].  If you have any questions or concerns, please call:  Pediatric Sedation Unit [phone]  Pediatric clinic  [phone]  [facility]  [phone] (ask for the Pediatric Anesthesia doctor on call)  Emergency department [phone]  [facility] toll-free number [phone] (Monday--Friday, 8 a.m. to 4:30 p.m.)  I understand these instructions. I have all of my personal belongings.

## 2019-10-19 LAB
APPEARANCE CSF: CLEAR
COLOR CSF: COLORLESS
RBC # CSF MANUAL: 0 /UL (ref 0–2)
TUBE # CSF: 1 #
WBC # CSF MANUAL: 1 /UL (ref 0–5)

## 2019-10-24 ENCOUNTER — OFFICE VISIT (OUTPATIENT)
Dept: PEDIATRIC HEMATOLOGY/ONCOLOGY | Facility: CLINIC | Age: 19
End: 2019-10-24
Attending: NURSE PRACTITIONER
Payer: COMMERCIAL

## 2019-10-24 ENCOUNTER — HOSPITAL ENCOUNTER (OUTPATIENT)
Facility: CLINIC | Age: 19
Discharge: HOME | End: 2019-10-24
Attending: RADIOLOGY | Admitting: RADIOLOGY
Payer: COMMERCIAL

## 2019-10-24 ENCOUNTER — INFUSION THERAPY VISIT (OUTPATIENT)
Dept: INFUSION THERAPY | Facility: CLINIC | Age: 19
End: 2019-10-24
Attending: NURSE PRACTITIONER
Payer: COMMERCIAL

## 2019-10-24 ENCOUNTER — ANESTHESIA EVENT (OUTPATIENT)
Dept: PEDIATRICS | Facility: CLINIC | Age: 19
End: 2019-10-24
Payer: COMMERCIAL

## 2019-10-24 ENCOUNTER — HOSPITAL ENCOUNTER (OUTPATIENT)
Dept: INTERVENTIONAL RADIOLOGY/VASCULAR | Facility: CLINIC | Age: 19
End: 2019-10-24
Attending: PHYSICIAN ASSISTANT | Admitting: RADIOLOGY
Payer: COMMERCIAL

## 2019-10-24 ENCOUNTER — HOSPITAL ENCOUNTER (OUTPATIENT)
Facility: CLINIC | Age: 19
End: 2019-10-24
Attending: RADIOLOGY | Admitting: RADIOLOGY
Payer: MEDICAID

## 2019-10-24 ENCOUNTER — OFFICE VISIT (OUTPATIENT)
Dept: PEDIATRIC HEMATOLOGY/ONCOLOGY | Facility: CLINIC | Age: 19
End: 2019-10-24

## 2019-10-24 ENCOUNTER — ANESTHESIA (OUTPATIENT)
Dept: PEDIATRICS | Facility: CLINIC | Age: 19
End: 2019-10-24
Payer: COMMERCIAL

## 2019-10-24 VITALS
RESPIRATION RATE: 20 BRPM | HEART RATE: 76 BPM | BODY MASS INDEX: 23.35 KG/M2 | SYSTOLIC BLOOD PRESSURE: 90 MMHG | HEIGHT: 73 IN | OXYGEN SATURATION: 100 % | WEIGHT: 176.15 LBS | TEMPERATURE: 97.6 F | DIASTOLIC BLOOD PRESSURE: 51 MMHG

## 2019-10-24 VITALS
RESPIRATION RATE: 18 BRPM | OXYGEN SATURATION: 97 % | TEMPERATURE: 98.4 F | SYSTOLIC BLOOD PRESSURE: 106 MMHG | DIASTOLIC BLOOD PRESSURE: 71 MMHG | HEART RATE: 72 BPM

## 2019-10-24 DIAGNOSIS — C83.50 T LYMPHOBLASTIC LYMPHOMA (H): ICD-10-CM

## 2019-10-24 DIAGNOSIS — C83.50 T LYMPHOBLASTIC LYMPHOMA (H): Primary | ICD-10-CM

## 2019-10-24 DIAGNOSIS — Z71.9 ENCOUNTER FOR COUNSELING: Primary | ICD-10-CM

## 2019-10-24 LAB
ABO + RH BLD: NORMAL
ABO + RH BLD: NORMAL
APTT PPP: 31 SEC (ref 22–37)
BASOPHILS # BLD AUTO: 0 10E9/L (ref 0–0.2)
BASOPHILS NFR BLD AUTO: 0 %
BLD GP AB SCN SERPL QL: NORMAL
BLD PROD TYP BPU: NORMAL
BLD UNIT ID BPU: 0
BLD UNIT ID BPU: 0
BLOOD BANK CMNT PATIENT-IMP: NORMAL
BLOOD PRODUCT CODE: NORMAL
BLOOD PRODUCT CODE: NORMAL
BPU ID: NORMAL
BPU ID: NORMAL
DIFFERENTIAL METHOD BLD: ABNORMAL
EOSINOPHIL # BLD AUTO: 0 10E9/L (ref 0–0.7)
EOSINOPHIL NFR BLD AUTO: 2.1 %
ERYTHROCYTE [DISTWIDTH] IN BLOOD BY AUTOMATED COUNT: 14.5 % (ref 10–15)
HCT VFR BLD AUTO: 22.5 % (ref 40–53)
HGB BLD-MCNC: 7.3 G/DL (ref 13.3–17.7)
IMM GRANULOCYTES # BLD: 0 10E9/L (ref 0–0.4)
IMM GRANULOCYTES NFR BLD: 1 %
INR PPP: 0.98 (ref 0.86–1.14)
LYMPHOCYTES # BLD AUTO: 0.6 10E9/L (ref 0.8–5.3)
LYMPHOCYTES NFR BLD AUTO: 29.7 %
MCH RBC QN AUTO: 26.8 PG (ref 26.5–33)
MCHC RBC AUTO-ENTMCNC: 32.4 G/DL (ref 31.5–36.5)
MCV RBC AUTO: 83 FL (ref 78–100)
MONOCYTES # BLD AUTO: 0.1 10E9/L (ref 0–1.3)
MONOCYTES NFR BLD AUTO: 3.1 %
NEUTROPHILS # BLD AUTO: 1.2 10E9/L (ref 1.6–8.3)
NEUTROPHILS NFR BLD AUTO: 64.1 %
NRBC # BLD AUTO: 0 10*3/UL
NRBC BLD AUTO-RTO: 0 /100
NUM BPU REQUESTED: 2
PLATELET # BLD AUTO: 92 10E9/L (ref 150–450)
RBC # BLD AUTO: 2.72 10E12/L (ref 4.4–5.9)
SPECIMEN EXP DATE BLD: NORMAL
TRANSFUSION STATUS PATIENT QL: NORMAL
WBC # BLD AUTO: 1.9 10E9/L (ref 4–11)

## 2019-10-24 PROCEDURE — 96450 CHEMOTHERAPY INTO CNS: CPT | Performed by: RADIOLOGY

## 2019-10-24 PROCEDURE — 25800030 ZZH RX IP 258 OP 636: Mod: ZF | Performed by: NURSE PRACTITIONER

## 2019-10-24 PROCEDURE — 25800030 ZZH RX IP 258 OP 636: Performed by: NURSE ANESTHETIST, CERTIFIED REGISTERED

## 2019-10-24 PROCEDURE — 25000128 H RX IP 250 OP 636: Performed by: NURSE ANESTHETIST, CERTIFIED REGISTERED

## 2019-10-24 PROCEDURE — 36561 INSERT TUNNELED CV CATH: CPT | Mod: LT

## 2019-10-24 PROCEDURE — 86901 BLOOD TYPING SEROLOGIC RH(D): CPT | Performed by: NURSE PRACTITIONER

## 2019-10-24 PROCEDURE — 25000125 ZZHC RX 250: Performed by: RADIOLOGY

## 2019-10-24 PROCEDURE — 36430 TRANSFUSION BLD/BLD COMPNT: CPT | Mod: 59

## 2019-10-24 PROCEDURE — 37000009 ZZH ANESTHESIA TECHNICAL FEE, EACH ADDTL 15 MIN: Performed by: RADIOLOGY

## 2019-10-24 PROCEDURE — P9040 RBC LEUKOREDUCED IRRADIATED: HCPCS | Performed by: NURSE PRACTITIONER

## 2019-10-24 PROCEDURE — 85610 PROTHROMBIN TIME: CPT | Performed by: PEDIATRICS

## 2019-10-24 PROCEDURE — 25800030 ZZH RX IP 258 OP 636: Mod: ZF | Performed by: PEDIATRICS

## 2019-10-24 PROCEDURE — 96411 CHEMO IV PUSH ADDL DRUG: CPT

## 2019-10-24 PROCEDURE — 86900 BLOOD TYPING SEROLOGIC ABO: CPT | Performed by: NURSE PRACTITIONER

## 2019-10-24 PROCEDURE — 40000165 ZZH STATISTIC POST-PROCEDURE RECOVERY CARE: Performed by: RADIOLOGY

## 2019-10-24 PROCEDURE — 86850 RBC ANTIBODY SCREEN: CPT | Performed by: NURSE PRACTITIONER

## 2019-10-24 PROCEDURE — 96365 THER/PROPH/DIAG IV INF INIT: CPT | Mod: 59 | Performed by: RADIOLOGY

## 2019-10-24 PROCEDURE — 86923 COMPATIBILITY TEST ELECTRIC: CPT | Performed by: NURSE PRACTITIONER

## 2019-10-24 PROCEDURE — 25000125 ZZHC RX 250: Performed by: NURSE ANESTHETIST, CERTIFIED REGISTERED

## 2019-10-24 PROCEDURE — 96413 CHEMO IV INFUSION 1 HR: CPT

## 2019-10-24 PROCEDURE — 27210902 ZZH KIT CR4

## 2019-10-24 PROCEDURE — 25000128 H RX IP 250 OP 636: Performed by: RADIOLOGY

## 2019-10-24 PROCEDURE — 89050 BODY FLUID CELL COUNT: CPT | Performed by: PEDIATRICS

## 2019-10-24 PROCEDURE — 96375 TX/PRO/DX INJ NEW DRUG ADDON: CPT | Mod: 59

## 2019-10-24 PROCEDURE — 37000008 ZZH ANESTHESIA TECHNICAL FEE, 1ST 30 MIN: Performed by: RADIOLOGY

## 2019-10-24 PROCEDURE — 85730 THROMBOPLASTIN TIME PARTIAL: CPT | Performed by: PEDIATRICS

## 2019-10-24 PROCEDURE — C1788 PORT, INDWELLING, IMP: HCPCS

## 2019-10-24 PROCEDURE — 25000128 H RX IP 250 OP 636

## 2019-10-24 PROCEDURE — 85025 COMPLETE CBC W/AUTO DIFF WBC: CPT | Performed by: PEDIATRICS

## 2019-10-24 PROCEDURE — C1769 GUIDE WIRE: HCPCS

## 2019-10-24 PROCEDURE — 25000128 H RX IP 250 OP 636: Mod: ZF | Performed by: NURSE PRACTITIONER

## 2019-10-24 PROCEDURE — 25000128 H RX IP 250 OP 636: Mod: ZF | Performed by: PEDIATRICS

## 2019-10-24 PROCEDURE — 40001011 ZZH STATISTIC PRE-PROCEDURE NURSING ASSESSMENT: Performed by: RADIOLOGY

## 2019-10-24 PROCEDURE — 25000128 H RX IP 250 OP 636: Performed by: PHYSICIAN ASSISTANT

## 2019-10-24 PROCEDURE — 25800030 ZZH RX IP 258 OP 636: Performed by: PEDIATRICS

## 2019-10-24 PROCEDURE — 25000566 ZZH SEVOFLURANE, EA 15 MIN: Performed by: RADIOLOGY

## 2019-10-24 PROCEDURE — 25000128 H RX IP 250 OP 636: Mod: JW | Performed by: PEDIATRICS

## 2019-10-24 PROCEDURE — 25000128 H RX IP 250 OP 636: Mod: ZF

## 2019-10-24 RX ORDER — CEFAZOLIN SODIUM 2 G/100ML
25 INJECTION, SOLUTION INTRAVENOUS ONCE
Status: COMPLETED | OUTPATIENT
Start: 2019-10-24 | End: 2019-10-24

## 2019-10-24 RX ORDER — HEPARIN SODIUM,PORCINE 10 UNIT/ML
5 VIAL (ML) INTRAVENOUS EVERY 24 HOURS
Status: CANCELLED | OUTPATIENT
Start: 2019-10-24

## 2019-10-24 RX ORDER — FENTANYL CITRATE 50 UG/ML
INJECTION, SOLUTION INTRAMUSCULAR; INTRAVENOUS PRN
Status: DISCONTINUED | OUTPATIENT
Start: 2019-10-24 | End: 2019-10-24

## 2019-10-24 RX ORDER — ONDANSETRON 2 MG/ML
INJECTION INTRAMUSCULAR; INTRAVENOUS
Status: COMPLETED
Start: 2019-10-24 | End: 2019-10-24

## 2019-10-24 RX ORDER — PROPOFOL 10 MG/ML
INJECTION, EMULSION INTRAVENOUS CONTINUOUS PRN
Status: DISCONTINUED | OUTPATIENT
Start: 2019-10-24 | End: 2019-10-24

## 2019-10-24 RX ORDER — LIDOCAINE 40 MG/G
CREAM TOPICAL
Status: DISCONTINUED | OUTPATIENT
Start: 2019-10-24 | End: 2019-10-24 | Stop reason: HOSPADM

## 2019-10-24 RX ORDER — HEPARIN SODIUM,PORCINE 10 UNIT/ML
1-5 VIAL (ML) INTRAVENOUS ONCE
Status: COMPLETED | OUTPATIENT
Start: 2019-10-24 | End: 2019-10-24

## 2019-10-24 RX ORDER — NICOTINE POLACRILEX 4 MG
15-30 LOZENGE BUCCAL
Status: CANCELLED | OUTPATIENT
Start: 2019-10-24

## 2019-10-24 RX ORDER — DEXTROSE MONOHYDRATE 25 G/50ML
25-50 INJECTION, SOLUTION INTRAVENOUS
Status: CANCELLED | OUTPATIENT
Start: 2019-10-24

## 2019-10-24 RX ORDER — SODIUM CHLORIDE, SODIUM LACTATE, POTASSIUM CHLORIDE, CALCIUM CHLORIDE 600; 310; 30; 20 MG/100ML; MG/100ML; MG/100ML; MG/100ML
INJECTION, SOLUTION INTRAVENOUS CONTINUOUS PRN
Status: DISCONTINUED | OUTPATIENT
Start: 2019-10-24 | End: 2019-10-24

## 2019-10-24 RX ORDER — GLYCOPYRROLATE 0.2 MG/ML
INJECTION, SOLUTION INTRAMUSCULAR; INTRAVENOUS PRN
Status: DISCONTINUED | OUTPATIENT
Start: 2019-10-24 | End: 2019-10-24

## 2019-10-24 RX ORDER — HEPARIN SODIUM (PORCINE) LOCK FLUSH IV SOLN 100 UNIT/ML 100 UNIT/ML
5 SOLUTION INTRAVENOUS
Status: DISCONTINUED | OUTPATIENT
Start: 2019-10-24 | End: 2019-10-24 | Stop reason: HOSPADM

## 2019-10-24 RX ORDER — HEPARIN SODIUM (PORCINE) LOCK FLUSH IV SOLN 100 UNIT/ML 100 UNIT/ML
5 SOLUTION INTRAVENOUS
Status: CANCELLED | OUTPATIENT
Start: 2019-10-24

## 2019-10-24 RX ORDER — LIDOCAINE HYDROCHLORIDE 20 MG/ML
INJECTION, SOLUTION INFILTRATION; PERINEURAL PRN
Status: DISCONTINUED | OUTPATIENT
Start: 2019-10-24 | End: 2019-10-24

## 2019-10-24 RX ORDER — ONDANSETRON 2 MG/ML
INJECTION INTRAMUSCULAR; INTRAVENOUS PRN
Status: DISCONTINUED | OUTPATIENT
Start: 2019-10-24 | End: 2019-10-24

## 2019-10-24 RX ORDER — GRANISETRON HYDROCHLORIDE 1 MG/ML
INJECTION INTRAVENOUS
Status: COMPLETED
Start: 2019-10-24 | End: 2019-10-24

## 2019-10-24 RX ORDER — PROPOFOL 10 MG/ML
INJECTION, EMULSION INTRAVENOUS PRN
Status: DISCONTINUED | OUTPATIENT
Start: 2019-10-24 | End: 2019-10-24

## 2019-10-24 RX ORDER — ONDANSETRON 2 MG/ML
8 INJECTION INTRAMUSCULAR; INTRAVENOUS ONCE
Status: COMPLETED | OUTPATIENT
Start: 2019-10-24 | End: 2019-10-24

## 2019-10-24 RX ORDER — GRANISETRON HYDROCHLORIDE 1 MG/ML
1 INJECTION INTRAVENOUS ONCE
Status: COMPLETED | OUTPATIENT
Start: 2019-10-24 | End: 2019-10-24

## 2019-10-24 RX ORDER — HEPARIN SODIUM (PORCINE) LOCK FLUSH IV SOLN 100 UNIT/ML 100 UNIT/ML
SOLUTION INTRAVENOUS
Status: COMPLETED
Start: 2019-10-24 | End: 2019-10-24

## 2019-10-24 RX ADMIN — PROPOFOL 150 MCG/KG/MIN: 10 INJECTION, EMULSION INTRAVENOUS at 09:53

## 2019-10-24 RX ADMIN — SODIUM CHLORIDE, POTASSIUM CHLORIDE, SODIUM LACTATE AND CALCIUM CHLORIDE: 600; 310; 30; 20 INJECTION, SOLUTION INTRAVENOUS at 08:30

## 2019-10-24 RX ADMIN — GLYCOPYRROLATE 0.1 MG: 0.2 INJECTION, SOLUTION INTRAMUSCULAR; INTRAVENOUS at 08:39

## 2019-10-24 RX ADMIN — PEGASPARGASE 4950 UNITS: 750 INJECTION, SOLUTION INTRAMUSCULAR; INTRAVENOUS at 11:52

## 2019-10-24 RX ADMIN — GRANISETRON HYDROCHLORIDE 1 MG: 1 INJECTION INTRAVENOUS at 10:59

## 2019-10-24 RX ADMIN — CEFAZOLIN SODIUM 2 G: 2 INJECTION, SOLUTION INTRAVENOUS at 08:53

## 2019-10-24 RX ADMIN — PROPOFOL 300 MCG/KG/MIN: 10 INJECTION, EMULSION INTRAVENOUS at 09:39

## 2019-10-24 RX ADMIN — PROPOFOL 150 MG: 10 INJECTION, EMULSION INTRAVENOUS at 08:39

## 2019-10-24 RX ADMIN — FENTANYL CITRATE 50 MCG: 50 INJECTION, SOLUTION INTRAMUSCULAR; INTRAVENOUS at 08:43

## 2019-10-24 RX ADMIN — ONDANSETRON 4 MG: 2 INJECTION INTRAMUSCULAR; INTRAVENOUS at 11:31

## 2019-10-24 RX ADMIN — LIDOCAINE HYDROCHLORIDE 3 ML: 20 INJECTION, SOLUTION INFILTRATION; PERINEURAL at 08:39

## 2019-10-24 RX ADMIN — HEPARIN 5 ML: 100 SYRINGE at 17:59

## 2019-10-24 RX ADMIN — Medication 5 ML: at 09:31

## 2019-10-24 RX ADMIN — LIDOCAINE HYDROCHLORIDE 10 ML: 10 INJECTION, SOLUTION EPIDURAL; INFILTRATION; INTRACAUDAL; PERINEURAL at 09:12

## 2019-10-24 RX ADMIN — VINCRISTINE SULFATE 2 MG: 1 INJECTION, SOLUTION INTRAVENOUS at 11:39

## 2019-10-24 RX ADMIN — HEPARIN SODIUM (PORCINE) LOCK FLUSH IV SOLN 100 UNIT/ML 5 ML: 100 SOLUTION at 17:59

## 2019-10-24 RX ADMIN — SODIUM CHLORIDE 50 ML: 9 INJECTION, SOLUTION INTRAVENOUS at 12:54

## 2019-10-24 RX ADMIN — ONDANSETRON 4 MG: 2 INJECTION INTRAMUSCULAR; INTRAVENOUS at 09:58

## 2019-10-24 ASSESSMENT — ENCOUNTER SYMPTOMS: APNEA: 0

## 2019-10-24 ASSESSMENT — MIFFLIN-ST. JEOR: SCORE: 1852.75

## 2019-10-24 NOTE — DISCHARGE INSTRUCTIONS
[facility]  Pediatric Interventional Radiology  Discharge Instructions for Implanted Port Placement    Date of Procedure: 10/24/2019     Today you had an Implanted Port Placed by Silviano Martins MD      Activity    No strenuous activity for 1 week    No heavy lifting (greater than 10 pounds) for 3 days    No contact sports for 2 weeks    No tub bath, hot tub, or swimming until Dermabond (skin glue) is no longer there (about 7-10 days)    Diet    Resume your regular diet    Discomfort    Pain medications as directed    Site Care    Your site(s) has been closed with {site closed with:[id number]}      If there is any oozing or bleeding from the site, apply direct pressure for 5-10 minutes with a gauze pad.  If bleeding continues after 10 minutes, call Pediatric Interventional Radiology.  If bleeding cannot be controlled with direct pressure, call [phone].      It is OK to shower and get the incision wet, but immediately pat it dry       ***     If sedation was given:    DO NOT drive or operate heavy machinery for 24 hours    DO NOT drink alcoholic beverages for 24 hours    DO NOT make important legal decisions for 24 hours     You must have a responsible adult to drive you home and stay with you for 24 hours    Call your Doctor if:    Bleeding    Swelling in your neck or arm    Fever greater than 100.5 degrees F (oral)    Other signs of infection such as redness, tenderness, or drainage from the wound    If you have any questions or concerns about this procedure:  Pediatric Interventional Radiology [phone] Mon-Fri, 7am to 5pm    [phone] After-hours, weekends, holidays  Ask for the Pediatric Interventional Radiologist on-call       [facility]   [phone]    Care post Lumbar Puncture     Do not remove bandage/dressing for 24 hours -- after this time they can be removed    No bath, shower or soaking of the dressing for 24 hours    Activity as tolerated by the  patient    Diet as able to tolerated    May use Tylenol as needed for pain control -- DO NOT use Ibuprofen    Can apply icepack to the site for discomfort -- no more than 10 minutes at a time    If bleeding presents apply pressure for 5 minutes    Call [phone] ask for Peds BMT/Hem/Onc fellow on call if complications arise including:    persistent bleeding    fever greater than 100.5    Pain    Lumbar punctures can cause headache. If the pain is not controlled with Tylenol (acetaminophen) please call the Peds BMT/Hem/Onc fellow on call

## 2019-10-24 NOTE — ANESTHESIA POSTPROCEDURE EVALUATION
Anesthesia POST Procedure Evaluation    Patient: Lazaro Lund   MRN:     [id number] Gender:   male   Age:    19 year old :      2000        Preoperative Diagnosis: lymphoma   Procedure(s):  INSERTION, VASCULAR ACCESS PORT  LUMBAR PUNCTURE, WITH INTRATHECAL CHEMOTHERAPY ADMINISTRATION   Postop Comments: No value filed.       Anesthesia Type:  Not documented  General    Reportable Event: NO     PAIN: Uncomplicated   Sign Out status: Comfortable, Well controlled pain     PONV: PONV Occurence     Symptoms:  Nausea only (mild)   Sign Out status:  No Nausea or Vomiting     Neuro/Psych: Uneventful perioperative course   Sign Out Status: Preoperative baseline     Airway/Resp.: Uneventful perioperative course   Sign Out Status: Non labored breathing, age appropriate RR     CV: Uneventful perioperative course   Sign Out status: Appropriate BP and perfusion indices     Disposition:   Sign Out in:  Peds sedation  Disposition: to his regular medical clinic per plan.  Recovery Course: Uneventful  Follow-Up: Not required     Comments/Narrative:  Awakening satisfactorily; strong; breathing well; had mild nausea treated with scopolamine patch and also Kytril. Did well; no other complaints or complications; comfortable on the way up to [facility].            Last Anesthesia Record Vitals:  CRNA VITALS  10/24/2019 0909 - 10/24/2019 1009      10/24/2019             NIBP:  (!) 92/36    Pulse:  74    Temp:  36.5  C (97.7  F)    SpO2:  100 %    Resp Rate (observed):  16      CRNA VITALS  10/24/2019 0933 - 10/24/2019 1033      10/24/2019             NIBP:  (!) 92/36    Pulse:  74    Temp:  36.5  C (97.7  F)    SpO2:  100 %    Resp Rate (observed):  16          Last PACU Vitals:  Vitals Value Taken Time   BP 92/42 10/24/2019 10:30 AM   Temp     Pulse 68 10/24/2019 10:26 AM   Resp 28 10/24/2019 10:30 AM   SpO2 99 % 10/24/2019 10:30 AM   Temp src     NIBP 92/36 10/24/2019 10:08 AM   Pulse 74 10/24/2019 10:08 AM   SpO2 100 %  10/24/2019 10:08 AM   Resp     Temp 36.5  C (97.7  F) 10/24/2019 10:08 AM   Ht Rate     Temp 2     Vitals shown include unvalidated device data.      Electronically Signed By: Miles Liu MD, October 24, 2019, 11:16 AM

## 2019-10-24 NOTE — LETTER
Date:October 31, 2019      Provider requested that no letter be sent. Do not send.       [facility] Information

## 2019-10-24 NOTE — PROGRESS NOTES
Pediatric Hematology/Oncology Clinic Note    Lazaro Lund is a 19 year old young man with newly diagnosed T-cell lymphoblastic lymphoma. Lazaro presented with acute onset cough and was found to have an anterior mediastinal mass and malignant left-sided pleural effusion.  A CT guided biopsy was obtained at [facility] and pathology was consistent with T-cell leukemia vs lymphoma.  He was admitted to [facility] oncology service 8/30 and started on treatment per [id number].  He had a pleural effusion that required a chest tube and a pericardial effusion that was not drained. His Induction was complicated by cardiac compression secondary to his mass leading to tamponade, this improved through out his hospital stay.  He also had dysphagia that improved with treatment of his mass, swallow study was normal. His course was recently complicated by extensive bilateral UE DVTs for which anticoagulation was started during hospitalization last week. His Day 29 LP was cancelled due to folliculitis overlying his lumbar spine.  Lazaro comes to clinic today for Day 15 of Consolidation.    HPI:   Since Lazaro's last visit he has been doing pretty well.  He had significant nausea with his cytarabine but was not using zofran prior.  Once he starting using the zofran the nausea improved.  He is currently not nauseated and his eating and drinking well.  His energy level is OK, he isn't doing much for activity and does get tired easily.  His arm is much improved, he feels the swelling has completely resolved.  He is not having pain.  He's noted some mildly distended veins on his chest and wonders about that.  No new skin concerns.  He denies any pain.  Denies any motor or sensory changes in his hands/feet.  He has not had fever.  No constipation.     Review of systems:  Remainder of ROS is complete and negative.    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  He was working full time at [employer] in [place] prior to his diagnosis.    Current Medications:  Current Outpatient Medications on File Prior to Visit   Medication Sig Dispense Refill     cytarabine, PF, (CYTOSAR) 100 MG/ML injection CHEMO Inject 1.5 mLs (150 mg) Subcutaneous daily for 3 days Start day after clinic dose. 4.5 mL 0     cytarabine, PF, (CYTOSAR) 100 MG/ML injection CHEMO Inject 1.5 mLs (150 mg) Subcutaneous daily for 3 days Start day after clinic dose. 4.5 mL 0     diphenhydrAMINE (BENADRYL) 25 MG capsule Take 1-2 capsules (25-50 mg) by mouth every 6 hours as needed (Breakthrough Nausea and Vomiting ) 30 capsule 1     enoxaparin (LOVENOX) 60 MG/0.6ML syringe Inject 0.6 mLs (60 mg) Subcutaneous every 12 hours 18 mL 1     melatonin 3 MG tablet Take 1 tablet (3 mg) by mouth At Bedtime 30 tablet 1     mercaptopurine (PURINETHOL) 50 MG tablet CHEMO Take 2.5 tablets (125mg) five days/week and 2 tablets (100mg) two days/week. Take on Days 1-14. 33 tablet 0     ondansetron (ZOFRAN) 4 MG tablet Take 2 tablets (8 mg) by mouth every 6 hours as needed (nausea / vomiting) 30 tablet 1     ondansetron (ZOFRAN) 8 MG tablet Take 1 tablet (8 mg) by mouth every 6 hours as needed for nausea 30 tablet 1     [] polyethylene glycol (MIRALAX/GLYCOLAX) packet Take 17 g by mouth daily (Patient taking differently: Take 17 g by mouth daily as needed ) 30 packet 0     scopolamine (TRANSDERM) 1 MG/3DAYS 72 hr  patch Place 1 patch onto the skin every 72 hours 10 patch 0     [] sennosides (SENOKOT) 8.6 MG tablet Take 2 tablets by mouth 2 times daily as needed for constipation 60 each 1     sulfamethoxazole-trimethoprim (BACTRIM DS/SEPTRA DS) 800-160 MG tablet Take 1 tablet by mouth Every Mon, Tues two times daily 16 tablet 0     Vitamin D, Cholecalciferol, 1000 units TABS Take 2 tablets (2,000 Units) by mouth daily 60 tablet 3   Received influenza vaccine for the 6715-2557 season.    Physical Exam:   Temp:  [98.1  F (36.7  C)] 98.1  F (36.7  C)  Pulse:  [79] 79  BP: (126)/(72) 126/72  SpO2:  [98 %] 98 %  Wt Readings from Last 4 Encounters:   10/24/19 79.9 kg (176 lb 2.4 oz)   10/17/19 76.9 kg (169 lb 8.5 oz)   10/14/19 75.1 kg (165 lb 9.1 oz)   10/10/19 74.8 kg (164 lb 14.5 oz)   Weight at diagnosis was 75.2kg, Lazaro considers his baseline weight to be 173-175lbs before he initially got sick  General: Lazaro is alert, interactive and appropriate. NAD. He is cushingoid.   HEENT: Skull is atrauamatic and normocephalic.  The right side of his face is noticeably full, no venous distention.  Full head of hair. PERRLA, sclera are non icteric and not injected, EOM are intact. Nares are patent without drainage. Oropharynx clear, no plaques noted. Buccal mucosa and tongue clear. MMM. Tympanic membranes are opaque bilaterally with light reflex and landmarks present.  Lymph:  Neck is supple without lymphadenopathy.  There is no supraclavicular, axillary or inguinal lymphadenopathy palpated.  Cardiovascular:  HR is regular, S1, S2 no murmur.  Capillary refill is < 2 seconds. Right arm without edema or erythema.  No pitting edema.  Cap refill < 2 sec. Peripheral pulses 2+/=. He has mildly distended veins noted on the left upper chest, not extending up to the neck, overlying the pectoralis.   Respiratory: No cough noted. Respirations are easy.  Lungs are clear to auscultation through out.  No crackles or  wheezes.  Gastrointestinal:  BS present in all quadrants.  Abdomen is soft and non-tender.  No hepatosplenomegaly or masses are palpated.  Skin: Truncal and upper extremity papular non-blanching erythematous rash, few pustules noted.  No vesicular lesions.   Neurological:  CN 2-12 grossly intact. Gait is normal.  No issues with balance. Sensation intact in hands and feet.     Musculoskeletal:  Good strength and ROM in all extremities.  Strong dorsiflexion at ankles and great toes (5/5) bilaterally without any pain at the Achilles.    Labs:   Results for orders placed or performed during the hospital encounter of 10/24/19 (from the past 24 hour(s))   CBC with platelets differential   Result Value Ref Range    WBC 1.9 (L) 4.0 - 11.0 10e9/L    RBC Count 2.72 (L) 4.4 - 5.9 10e12/L    Hemoglobin 7.3 (L) 13.3 - 17.7 g/dL    Hematocrit 22.5 (L) 40.0 - 53.0 %    MCV 83 78 - 100 fl    MCH 26.8 26.5 - 33.0 pg    MCHC 32.4 31.5 - 36.5 g/dL    RDW 14.5 10.0 - 15.0 %    Platelet Count 92 (L) 150 - 450 10e9/L    Diff Method Automated Method     % Neutrophils 64.1 %    % Lymphocytes 29.7 %    % Monocytes 3.1 %    % Eosinophils 2.1 %    % Basophils 0.0 %    % Immature Granulocytes 1.0 %    Nucleated RBCs 0 0 /100    Absolute Neutrophil 1.2 (L) 1.6 - 8.3 10e9/L    Absolute Lymphocytes 0.6 (L) 0.8 - 5.3 10e9/L    Absolute Monocytes 0.1 0.0 - 1.3 10e9/L    Absolute Eosinophils 0.0 0.0 - 0.7 10e9/L    Absolute Basophils 0.0 0.0 - 0.2 10e9/L    Abs Immature Granulocytes 0.0 0 - 0.4 10e9/L    Absolute Nucleated RBC 0.0    INR   Result Value Ref Range    INR 0.98 0.86 - 1.14   Partial thromboplastin time   Result Value Ref Range    PTT 31 22 - 37 sec     A Lumbar Puncture was performed in the Pediatric Sedation Suite. Informed consent was obtained prior to the procedure. Lazaro Lund was identified by facial recognition and ID arm band. A time-out was performed. Lazaro Lund was then placed in the left lateral decubitus position  and the lumbosacral area was sterily prepped using Chloraprep followed by drape placement. Anatomic landmarks were identified by palpation. Then, a 22 gauge, 3.5 inch spinal needle was easily inserted into the L3/L4 interspace, about 1.5 inches from the hub. On the first attempt approximately 3 mL of clear and colorless cerebrospinal fluid was obtained to be sent to the lab for cell count analysis and cytospin. Following that, 15mg of intrathecal methotrexate in 6 mL of preservative-free normal saline was infused without resistance. The needle was removed and a Band-Aid applied. Lazaro Lund tolerated this procedure very well.    Assessment:  Lazaro Lund is a 19 year old young man with newly diagnosed T cell lymphoblastic lymphoma (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  He is being treated per [facility] protocol [id number] and comes to clinic today for Day 15 of Consolidation therapy.  He's had a CRu, resolution of lymphadenopathy however a small pleural effusion persists.  His Day 29 LP was delayed due to folliculitis.  His course has been complicated by extensive bilateral DVT, on lovenox for anticoagulation. 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  He has minor venous distention overlying his left chest.  Recent Anti-Xa was therapeutic.  His counts are dropping.  He is having a port placed today. He is on Vit D for deficiency.  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     Plan:   1) Continue lovenox at current dose, recent level in goal range of 0.6-1.  Will recheck level once he is not having weekly LPs.  Lazaro held lovenox yesterday PM and this AM.  He will also hold this PM and restart tomorrow. Will maintain platelet count > 30K while on anticoagulation.  U/S stable last week, minor venous distention not surprising given the extent of his known thrombus, no other symptoms to suggest new acute thrombus. Plan to repeat U/S in 1-2 months.  2) Proceed with Day 15 therapy,  reviewed potential side effects of medications including potential for clot with PEG Asparaginase.  Reviewed s/sx to monitor for and reasons to call.   3) Continue Vit D 3 2000IU daily, recheck level in 2 months.  4) Day 29 LP deferred, will make up on Day 29 of Consolidation.   5) Given significant spinal headache will plan for IV caffeine following LPs in the future.   6) Port placement today.  7) Plan to repeat CT at the end of Consolidation, if pleural effusion persists at that time may need to consider tapping it to see if malignant fluid persists.  8) Transfuse 2 units of PRBCs today, discussed s/sx of anemia, if theses develop prior to his appt next week he will call to be seen.  9) He recently had cardiology follow up and they recommended echocardiograms per our routine with no further cardiology follow up needed unless he develops new symptoms.  10) Plan to recheck TPMT with genotype once that testing becomes available again.  11) RTC in 1 week for Day 22 of Consolidation therapy.

## 2019-10-24 NOTE — PROGRESS NOTES
Lazaro came to clinic today for C1 D15 VCR/PEG due to lymphoma. Patient arrived to [facility] from peds sedation after having a port placed and an LP with Chemo. Pt arrived with Caffeine fluids infusing. Patient denies any fevers and/or infections but noted nausea. 4 mg of Zofran given (4mg were given in peds sed, for a total of ordered 8mg). VCR given to gravity into port with + blood return noted pre/mid/post infusion. PEG given over 1 hour as ordered without issue. VSS. 1 Hour observation post PEG completed without issue. 2 units of PRBCs given each over 2 hours for HGB of 7.3 today.   Patient seen by provider Luana Van while in clinic. Port heparin locked at the end of the appointment and pt left in stable condition with mother at approximately 1815.

## 2019-10-24 NOTE — ANESTHESIA PREPROCEDURE EVALUATION
Anesthesia Pre-Procedure Evaluation    Patient: Lazaro Lund   MRN:     [id number] Gender:   male   Age:    19 year old :      2000        Preoperative Diagnosis: lymphoma   Procedure(s):  INSERTION, VASCULAR ACCESS PORT  LUMBAR PUNCTURE, DIAGNOSTIC     Past Medical History:   Diagnosis Date     DVT of upper extremity (deep vein thrombosis) (H) 2019    Bilateral      Migraine 2006    have resolved     T lymphoblastic lymphoma (H) 2019      Past Surgical History:   Procedure Laterality Date     BONE MARROW BIOPSY, BONE SPECIMEN, NEEDLE/TROCAR Left 2019    Procedure: BIOPSY, BONE MARROW;  Surgeon: Heather Lopez MD;  Location: [facility] OR     INSERT PICC LINE N/A 2019    Procedure: INSERTION, PICC;  Surgeon: Michell Keith MD;  Location: [facility] OR     IR CHEST TUBE PLACEMENT NON-TUNNELLED LEFT  2019     IR PICC PLACEMENT > 5 YRS OF AGE  2019     SPINAL PUNCTURE,LUMBAR, INTRATHECAL CHEMO DELIVERY N/A 2019    Procedure: LUMBAR PUNCTURE, WITH INTRATHECAL CHEMOTHERAPY ADMINISTRATION;  Surgeon: Heather Lopez MD;  [facility]: [facility] OR     SPINAL PUNCTURE,LUMBAR, INTRATHECAL CHEMO DELIVERY N/A 2019    Procedure: Lumbar Puncture With Intrathecal Chemo;  Surgeon: Alexi Hayes MD;  Location: [facility] OR     SPINAL PUNCTURE,LUMBAR, INTRATHECAL CHEMO DELIVERY N/A 10/10/2019    Procedure: Lumbar puncture with IT Chemo (CD);  Surgeon: Reynaldo Campuzano MD;  Location: [facility] PEDS SEDATION      SPINAL PUNCTURE,LUMBAR, INTRATHECAL CHEMO DELIVERY N/A 10/17/2019    Procedure: Lumbar puncture with IT Chemo (not CD);  Surgeon: Reynaldo Campuzano MD;  Location: [facility] PEDS SEDATION      THORACENTESIS N/A 2019    Procedure: Thoracentesis;  Surgeon: Michell Keith MD;  Location: [facility] OR          Anesthesia Evaluation    ROS/Med Hx    No history of anesthetic complications  (-) malignant hyperthermia and tuberculosis  Comments: Met with Lazaro who is NPO and has T cell  lymphoblastic leukemia and is now being cared for with an LP and insertion of a vascular access port.  Lazaro has done well with prior anesthesia cares.   He smokes 0.5 PPD for the last 2 years.   Public service information given to him.     Cardiovascular Findings - negative ROS    Neuro Findings - negative ROS    Pulmonary Findings   (-) asthma and apnea (indicates he has been told that he snores)    HENT Findings - negative HENT ROS    Skin Findings - negative skin ROS      GI/Hepatic/Renal Findings   (-) GERD    Endocrine/Metabolic Findings - negative ROS      Genetic/Syndrome Findings - negative genetics/syndromes ROS    Hematology/Oncology Findings   (+) cancer    Additional Notes  Allergies:   -- No Known Drug Allergies     Current Facility-Administered Medications:  caffeine-sodium benzoate 500 mg in sodium chloride 0.9 % 500 mL intermittent infusion  methotrexate (PF) 15 mg in sodium chloride 0.9 % 6 mL intrathecal inj (PF)    Medications Prior to Admission:  cytarabine, PF, (CYTOSAR) 100 MG/ML injection CHEMO, Inject 1.5 mLs (150 mg) Subcutaneous daily for 3 days Start day after clinic dose., Disp: 4.5 mL, Rfl: 0  cytarabine, PF, (CYTOSAR) 100 MG/ML injection CHEMO, Inject 1.5 mLs (150 mg) Subcutaneous daily for 3 days Start day after clinic dose., Disp: 4.5 mL, Rfl: 0, Past Week at Unknown time  diphenhydrAMINE (BENADRYL) 25 MG capsule, Take 1-2 capsules (25-50 mg) by mouth every 6 hours as needed (Breakthrough Nausea and Vomiting ), Disp: 30 capsule, Rfl: 1, Taking  enoxaparin (LOVENOX) 60 MG/0.6ML syringe, Inject 0.6 mLs (60 mg) Subcutaneous every 12 hours, Disp: 18 mL, Rfl: 1  melatonin 3 MG tablet, Take 1 tablet (3 mg) by mouth At Bedtime, Disp: 30 tablet, Rfl: 1, Past Week at Unknown time  mercaptopurine (PURINETHOL) 50 MG tablet CHEMO, Take 2.5 tablets (125mg) five days/week and 2 tablets (100mg) two days/week. Take on Days 1-14., Disp: 33 tablet, Rfl: 0, 10/17/2019 at 0700  ondansetron (ZOFRAN) 4  MG tablet, Take 2 tablets (8 mg) by mouth every 6 hours as needed (nausea / vomiting), Disp: 30 tablet, Rfl: 1, Past Week at Unknown time  ondansetron (ZOFRAN) 8 MG tablet, Take 1 tablet (8 mg) by mouth every 6 hours as needed for nausea, Disp: 30 tablet, Rfl: 1, Past Week at Unknown time  () polyethylene glycol (MIRALAX/GLYCOLAX) packet, Take 17 g by mouth daily (Patient taking differently: Take 17 g by mouth daily as needed ), Disp: 30 packet, Rfl: 0, Past Week at Unknown time  scopolamine (TRANSDERM) 1 MG/3DAYS 72 hr patch, Place 1 patch onto the skin every 72 hours, Disp: 10 patch, Rfl: 0, 10/17/2019 at Unknown time  () sennosides (SENOKOT) 8.6 MG tablet, Take 2 tablets by mouth 2 times daily as needed for constipation, Disp: 60 each, Rfl: 1, 2019 at 1100  sulfamethoxazole-trimethoprim (BACTRIM DS/SEPTRA DS) 800-160 MG tablet, Take 1 tablet by mouth Every Mon, Tu two times daily, Disp: 16 tablet, Rfl: 0, Past Week at Unknown time  Vitamin D, Cholecalciferol, 1000 units TABS, Take 2 tablets (2,000 Units) by mouth daily, Disp: 60 tablet, Rfl: 3, 10/17/2019 at 0700            PHYSICAL EXAM:   Mental Status/Neuro: A/A/O   Airway: Facies: Feasible  Mallampati: II  Mouth/Opening: Full  TM distance: > 6 cm  Neck ROM: Full   Respiratory: Auscultation: CTAB     Resp. Rate: Normal     Resp. Effort: Normal      CV: Rhythm: Regular  Rate: Age appropriate  Heart: Normal Sounds  Edema: None   Comments:      Dental: Details                    LABS:  CBC:   Lab Results   Component Value Date    WBC 1.7 (L) 10/17/2019    WBC 3.6 (L) 10/10/2019    HGB 9.4 (L) 10/17/2019    HGB 10.6 (L) 10/10/2019    HCT 29.5 (L) 10/17/2019    HCT 34.2 (L) 10/10/2019     10/17/2019     10/10/2019     BMP:   Lab Results   Component Value Date     10/10/2019     10/01/2019    POTASSIUM 4.1 10/10/2019    POTASSIUM 3.8 10/01/2019    CHLORIDE 108 10/10/2019    CHLORIDE 102 10/01/2019    CO2 30 10/10/2019  "   CO2 31 10/01/2019    BUN 15 10/10/2019    BUN 16 10/01/2019    CR 0.54 (L) 10/10/2019    CR 0.44 (L) 10/01/2019    GLC 87 10/10/2019    GLC 74 10/01/2019     COAGS:   Lab Results   Component Value Date    PTT 32 09/16/2019    INR 1.26 (H) 09/16/2019    FIBR 75 (LL) 09/16/2019     POC:   Lab Results   Component Value Date    BGM 80 08/31/2019     OTHER:   Lab Results   Component Value Date    MICHAEL 8.5 10/10/2019    PHOS 3.4 09/09/2019    MAG 2.0 09/09/2019    ALBUMIN 2.9 (L) 10/10/2019    PROTTOTAL 6.0 (L) 10/10/2019    ALT 89 (H) 10/10/2019    AST 29 10/10/2019    ALKPHOS 87 10/10/2019    BILITOTAL 0.2 10/10/2019        Preop Vitals    BP Readings from Last 3 Encounters:   10/17/19 110/54   10/17/19 124/83   10/14/19 105/64    Pulse Readings from Last 3 Encounters:   10/17/19 73   10/17/19 75   10/14/19 107      Resp Readings from Last 3 Encounters:   10/17/19 16   10/17/19 16   10/14/19 20    SpO2 Readings from Last 3 Encounters:   10/17/19 98%   10/17/19 97%   10/14/19 97%      Temp Readings from Last 1 Encounters:   10/17/19 36.6  C (97.8  F) (Oral)    Ht Readings from Last 1 Encounters:   10/17/19 1.844 m (6' 0.6\")      Wt Readings from Last 1 Encounters:   10/17/19 76.9 kg (169 lb 8.5 oz)    Estimated body mass index is 22.62 kg/m  as calculated from the following:    Height as of 10/17/19: 1.844 m (6' 0.6\").    Weight as of 10/17/19: 76.9 kg (169 lb 8.5 oz).     LDA:        Assessment:   ASA SCORE: 2    H&P: History and physical reviewed and following examination; no interval change.     Smoking Status:  Active Smoker       - patient did not smoke on day of surgery       - instructed to abstain from smoking on day of procedure   NPO Status: NPO Appropriate     Plan:   Anes. Type:  General   Pre-Medication: None   Induction:  IV (Standard)   Airway: LMA   Access/Monitoring: PIV   Maintenance: Balanced     Postop Plan:   Postop Pain: Opioids  Postop Sedation/Airway: Not planned     PONV Management:   Adult " Risk Factors:, Postop Opioids   Prevention: Ondansetron     CONSENT: Direct conversation   Plan and risks discussed with: Patient   Blood Products: Consent Deferred (Minimal Blood Loss)       Comments for Plan/Consent:  He requests GA. Procedures and risks explained. He understood and consented. Qs answered.          Miles Liu MD

## 2019-10-24 NOTE — ANESTHESIA CARE TRANSFER NOTE
Patient: Lazaro Lund    Procedure(s):  INSERTION, VASCULAR ACCESS PORT  LUMBAR PUNCTURE, WITH INTRATHECAL CHEMOTHERAPY ADMINISTRATION    Diagnosis: lymphoma  Diagnosis Additional Information: No value filed.    Anesthesia Type:   General     Note:  Airway :Face Mask  Patient transferred to:PS Recovery  Comments: To PS Recovery with 02, Spont RR. Monitors applied, VSS, IV/airway Patent, Report to RN all questions/concerns answered.  Handoff Report: Identifed the Patient, Identified the Reponsible Provider, Reviewed the pertinent medical history, Discussed the surgical course, Reviewed Intra-OP anesthesia mangement and issues during anesthesia, Set expectations for post-procedure period and Allowed opportunity for questions and acknowledgement of understanding      Vitals: (Last set prior to Anesthesia Care Transfer)    CRNA VITALS  10/24/2019 0909 - 10/24/2019 1009      10/24/2019             NIBP:  (!) 92/36    Pulse:  74    Temp:  36.5  C (97.7  F)    SpO2:  100 %    Resp Rate (observed):  16      CRNA VITALS  10/24/2019 0933 - 10/24/2019 1017      10/24/2019             NIBP:  (!) 92/36    Pulse:  74    Temp:  36.5  C (97.7  F)    SpO2:  100 %    Resp Rate (observed):  16                Electronically Signed By: YOHAN Suarez CRNA  October 24, 2019  10:17 AM

## 2019-10-24 NOTE — PROGRESS NOTES
Child-Family Life Assessment  Child Life    Location Sedation(Vascular Access Port Insertion )   Intervention Supportive Check In(CFL Introduced self and services. Offered comfort/ fidget items for patient to utilize for induction. Patient declined needed CFL services at this time. )   Anxiety Low Anxiety   Outcomes/Follow Up Continue to Follow/Support

## 2019-10-24 NOTE — LETTER
10/24/2019      RE: Lazaro Lund  [address] [facility] [address]       Pediatric Hematology/Oncology Clinic Note    Lazaro Lund is a 19 year old young man with newly diagnosed T-cell lymphoblastic lymphoma. Lazaro presented with acute onset cough and was found to have an anterior mediastinal mass and malignant left-sided pleural effusion.  A CT guided biopsy was obtained at [facility] and pathology was consistent with T-cell leukemia vs lymphoma.  He was admitted to [facility] oncology service 8/30 and started on treatment per [id number].  He had a pleural effusion that required a chest tube and a pericardial effusion that was not drained. His Induction was complicated by cardiac compression secondary to his mass leading to tamponade, this improved through out his hospital stay.  He also had dysphagia that improved with treatment of his mass, swallow study was normal. His course was recently complicated by extensive bilateral UE DVTs for which anticoagulation was started during hospitalization last week. His Day 29 LP was cancelled due to folliculitis overlying his lumbar spine.  Lazaro comes to clinic today for Day 15 of Consolidation.    HPI:   Since Lazaro's last visit he has been doing pretty well.  He had significant nausea with his cytarabine but was not using zofran prior.  Once he starting using the zofran the nausea improved.  He is currently not nauseated and his eating and drinking well.  His energy level is OK, he isn't doing much for activity and does get tired easily.  His arm is much improved, he feels the swelling has completely resolved.  He is not having pain.  He's noted some mildly distended veins on his chest and wonders about that.  No new skin concerns.  He denies any pain.  Denies any motor or sensory changes in his hands/feet.  He has not had fever.  No constipation.     Review of systems:  Remainder of ROS is complete and negative.    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  He was working full time at [employer] in [place] prior to his diagnosis.    Current Medications:  Current Outpatient Medications on File Prior to Visit   Medication Sig Dispense Refill     cytarabine, PF, (CYTOSAR) 100 MG/ML injection CHEMO Inject 1.5 mLs (150 mg) Subcutaneous daily for 3 days Start day after clinic dose. 4.5 mL 0     cytarabine, PF, (CYTOSAR) 100 MG/ML injection CHEMO Inject 1.5 mLs (150 mg) Subcutaneous daily for 3 days Start day after clinic dose. 4.5 mL 0     diphenhydrAMINE (BENADRYL) 25 MG capsule Take 1-2 capsules (25-50 mg) by mouth every 6 hours as needed (Breakthrough Nausea and Vomiting ) 30 capsule 1     enoxaparin (LOVENOX) 60 MG/0.6ML syringe Inject 0.6 mLs (60 mg) Subcutaneous every 12 hours 18 mL 1     melatonin 3 MG tablet Take 1 tablet (3 mg) by mouth At Bedtime 30 tablet 1     mercaptopurine (PURINETHOL) 50 MG tablet CHEMO Take 2.5 tablets (125mg) five days/week and 2 tablets (100mg) two days/week. Take on Days 1-14. 33 tablet 0     ondansetron (ZOFRAN) 4 MG tablet Take 2 tablets (8 mg) by mouth every 6 hours as needed (nausea / vomiting) 30 tablet 1     ondansetron (ZOFRAN) 8 MG tablet Take 1 tablet (8 mg) by mouth every 6 hours as needed for nausea 30 tablet 1     [] polyethylene glycol (MIRALAX/GLYCOLAX) packet Take 17 g by mouth daily (Patient taking differently: Take 17  g by mouth daily as needed ) 30 packet 0     scopolamine (TRANSDERM) 1 MG/3DAYS 72 hr patch Place 1 patch onto the skin every 72 hours 10 patch 0     [] sennosides (SENOKOT) 8.6 MG tablet Take 2 tablets by mouth 2 times daily as needed for constipation 60 each 1     sulfamethoxazole-trimethoprim (BACTRIM DS/SEPTRA DS) 800-160 MG tablet Take 1 tablet by mouth Every Mon, Tu two times daily 16 tablet 0     Vitamin D, Cholecalciferol, 1000 units TABS Take 2 tablets (2,000 Units) by mouth daily 60 tablet 3   Received influenza vaccine for the 3007-9921 season.    Physical Exam:   Temp:  [98.1  F (36.7  C)] 98.1  F (36.7  C)  Pulse:  [79] 79  BP: (126)/(72) 126/72  SpO2:  [98 %] 98 %  Wt Readings from Last 4 Encounters:   10/24/19 79.9 kg (176 lb 2.4 oz)   10/17/19 76.9 kg (169 lb 8.5 oz)   10/14/19 75.1 kg (165 lb 9.1 oz)   10/10/19 74.8 kg (164 lb 14.5 oz)   Weight at diagnosis was 75.2kg, Lazaro considers his baseline weight to be 173-175lbs before he initially got sick  General: Lazaro is alert, interactive and appropriate. NAD. He is cushingoid.   HEENT: Skull is atrauamatic and normocephalic.  The right side of his face is noticeably full, no venous distention.  Full head of hair. PERRLA, sclera are non icteric and not injected, EOM are intact. Nares are patent without drainage. Oropharynx clear, no plaques noted. Buccal mucosa and tongue clear. MMM. Tympanic membranes are opaque bilaterally with light reflex and landmarks present.  Lymph:  Neck is supple without lymphadenopathy.  There is no supraclavicular, axillary or inguinal lymphadenopathy palpated.  Cardiovascular:  HR is regular, S1, S2 no murmur.  Capillary refill is < 2 seconds. Right arm without edema or erythema.  No pitting edema.  Cap refill < 2 sec. Peripheral pulses 2+/=. He has mildly distended veins noted on the left upper chest, not extending up to the neck, overlying the pectoralis.   Respiratory: No cough noted. Respirations are easy.   Lungs are clear to auscultation through out.  No crackles or wheezes.  Gastrointestinal:  BS present in all quadrants.  Abdomen is soft and non-tender.  No hepatosplenomegaly or masses are palpated.  Skin: Truncal and upper extremity papular non-blanching erythematous rash, few pustules noted.  No vesicular lesions.   Neurological:  CN 2-12 grossly intact. Gait is normal.  No issues with balance. Sensation intact in hands and feet.     Musculoskeletal:  Good strength and ROM in all extremities.  Strong dorsiflexion at ankles and great toes (5/5) bilaterally without any pain at the Achilles.    Labs:   Results for orders placed or performed during the hospital encounter of 10/24/19 (from the past 24 hour(s))   CBC with platelets differential   Result Value Ref Range    WBC 1.9 (L) 4.0 - 11.0 10e9/L    RBC Count 2.72 (L) 4.4 - 5.9 10e12/L    Hemoglobin 7.3 (L) 13.3 - 17.7 g/dL    Hematocrit 22.5 (L) 40.0 - 53.0 %    MCV 83 78 - 100 fl    MCH 26.8 26.5 - 33.0 pg    MCHC 32.4 31.5 - 36.5 g/dL    RDW 14.5 10.0 - 15.0 %    Platelet Count 92 (L) 150 - 450 10e9/L    Diff Method Automated Method     % Neutrophils 64.1 %    % Lymphocytes 29.7 %    % Monocytes 3.1 %    % Eosinophils 2.1 %    % Basophils 0.0 %    % Immature Granulocytes 1.0 %    Nucleated RBCs 0 0 /100    Absolute Neutrophil 1.2 (L) 1.6 - 8.3 10e9/L    Absolute Lymphocytes 0.6 (L) 0.8 - 5.3 10e9/L    Absolute Monocytes 0.1 0.0 - 1.3 10e9/L    Absolute Eosinophils 0.0 0.0 - 0.7 10e9/L    Absolute Basophils 0.0 0.0 - 0.2 10e9/L    Abs Immature Granulocytes 0.0 0 - 0.4 10e9/L    Absolute Nucleated RBC 0.0    INR   Result Value Ref Range    INR 0.98 0.86 - 1.14   Partial thromboplastin time   Result Value Ref Range    PTT 31 22 - 37 sec     A Lumbar Puncture was performed in the Pediatric Sedation Suite. Informed consent was obtained prior to the procedure. Lazaro DIMITRIOS Lund was identified by facial recognition and ID arm band. A time-out was performed. Lazaro PLUNKETT  Ashely was then placed in the left lateral decubitus position and the lumbosacral area was sterily prepped using Chloraprep followed by drape placement. Anatomic landmarks were identified by palpation. Then, a 22 gauge, 3.5 inch spinal needle was easily inserted into the L3/L4 interspace, about 1.5 inches from the hub. On the first attempt approximately 3 mL of clear and colorless cerebrospinal fluid was obtained to be sent to the lab for cell count analysis and cytospin. Following that, 15mg of intrathecal methotrexate in 6 mL of preservative-free normal saline was infused without resistance. The needle was removed and a Band-Aid applied. Lazaro Lund tolerated this procedure very well.    Assessment:  Lazaro Lund is a 19 year old young man with newly diagnosed T cell lymphoblastic lymphoma (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  He is being treated per [facility] protocol [id number] and comes to clinic today for Day 15 of Consolidation therapy.  He's had a CRu, resolution of lymphadenopathy however a small pleural effusion persists.  His Day 29 LP was delayed due to folliculitis.  His course has been complicated by extensive bilateral DVT, on lovenox for anticoagulation. 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  He has minor venous distention overlying his left chest.  Recent Anti-Xa was therapeutic.  His counts are dropping.  He is having a port placed today. He is on Vit D for deficiency.  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     Plan:   1) Continue lovenox at current dose, recent level in goal range of 0.6-1.  Will recheck level once he is not having weekly LPs.  Lazaro held lovenox yesterday PM and this AM.  He will also hold this PM and restart tomorrow. Will maintain platelet count > 30K while on anticoagulation.  U/S stable last week, minor venous distention not surprising given the extent of his known thrombus, no other symptoms to suggest new acute thrombus. Plan to  repeat U/S in 1-2 months.  2) Proceed with Day 15 therapy, reviewed potential side effects of medications including potential for clot with PEG Asparaginase.  Reviewed s/sx to monitor for and reasons to call.   3) Continue Vit D 3 2000IU daily, recheck level in 2 months.  4) Day 29 LP deferred, will make up on Day 29 of Consolidation.   5) Given significant spinal headache will plan for IV caffeine following LPs in the future.   6) Port placement today.  7) Plan to repeat CT at the end of Consolidation, if pleural effusion persists at that time may need to consider tapping it to see if malignant fluid persists.  8) Transfuse 2 units of PRBCs today, discussed s/sx of anemia, if theses develop prior to his appt next week he will call to be seen.  9) He recently had cardiology follow up and they recommended echocardiograms per our routine with no further cardiology follow up needed unless he develops new symptoms.  10) Plan to recheck TPMT with genotype once that testing becomes available again.  11) RTC in 1 week for Day 22 of Consolidation therapy.            YOHAN Dunn CNP

## 2019-10-24 NOTE — PROCEDURES
[facility], [place]    Procedure: RIJV Port placement  Date/Time: 10/24/2019 9:35 AM  Performed by: Silviano Martins MD  Authorized by: Silviano Martins MD     UNIVERSAL PROTOCOL   Site Marked: NA  Prior Images Obtained and Reviewed:  Yes  Required items: Required blood products, implants, devices and special equipment available    Patient identity confirmed:  Verbally with patient  Patient was reevaluated immediately before administering moderate or deep sedation or anesthesia  Confirmation Checklist:  Patient's identity using two indicators, relevant allergies, procedure was appropriate and matched the consent or emergent situation and correct equipment/implants were available  Time out: Immediately prior to the procedure a time out was called    Preparation: Patient was prepped and draped in usual sterile fashion    ESBL (mL):  5     ANESTHESIA    Local Anesthetic: Lidocaine 1% without epinephrine  Anesthetic Total (mL):  10      SEDATION    Patient Sedated: Yes    Sedation Type:  Deep  Sedation:  Propofol  Vital signs: Vital signs monitored during sedation    PROCEDURE   Patient Tolerance:  Patient tolerated the procedure well with no immediate complications  Describe Procedure: Occlusive RIJ clot. Access at the RIJV and subclavian confluence.  8F RIJV port placement with tip overlying low SVC.  Time of Sedation in Minutes by Physician:  30

## 2019-10-24 NOTE — LETTER
10/24/2019      RE: Lazaro Lund  [address] [facility] [address]       [facility]  PEDIATRIC HEMATOLOGY/ONCOLOGY   SOCIAL WORK PROGRESS NOTE      DATA:     Lazaro is a 19 year old male with T-Cell Lymphoblastic Lymphoma who presents to clinic on this date for day 15 of consolidation therapy per [id number] met supportively with Lazaro in infusion during his chemo to check-in, follow-up regarding insurance and offer support.     Lazaro reports that overall he is doing well. He is living with his Mom in [place]. He explained he really likes this as she works during the day and he is able to rest, play computer games and enjoy quiet. He feels like his side effects are well managed and he is adjusting appropriately to his treatment schedule and therapy. His MA, while approved did not backdate to August 1st as requested when  FC helped him apply. [facility] Eliza has been in contact with [facility]  to request that this be fixed on his case. Presently MA only backdates to 9/1/19. It needs to go back to 8/1/19. SW contacted [employer] Billing ([phone], Inga) and [facility] Billing ([phone], Anant) to request a note be added to his accounts that FC is working with [facility] to request corrected effective date. SW or patient will notify them once this is corrected and request August bills be re-submitted to MA for payment. Lazaro denied any questions surrounding this .    Lazaro is awaiting response as to whether or not he is approved for Social Security. He completed [employer] application for assistance with gas cards. We discussed [facility] application for travel assistance. SW will apply for this online. Lazaro explained that other than gas and parking, financially he is stable.     INTERVENTION:     1. Supportive counseling. Check-in.  2. Contact with [employer] Billing re: hold on account for [facility] to fix MA effective date.   3. Matt  applications.     ASSESSMENT:     Lazaro appears to be tolerating his therapy well. His mood is stable, affect within normal limits. He denies any depressive or anxious symptoms. He feels well supported by his family. He enjoys spending time tami on his computer and finds this relaxing. He is open to and appreciative of ongoing therapeutic support, advocacy, and connection with resources.     PLAN:     Social work will continue to assess needs and provide ongoing psychosocial support and access to resources.      CHEY Davis, LincolnHealthSW, OSW-C  Clinical    Pediatric Hematology Oncology   [facility]   Monday-Thursday   Phone: [phone]  Pager: [phone]    NO LETTER                CHEY Alcantara

## 2019-10-25 LAB
APPEARANCE CSF: CLEAR
COLOR CSF: COLORLESS
RBC # CSF MANUAL: 39 /UL (ref 0–2)
TUBE # CSF: 1 #
WBC # CSF MANUAL: 1 /UL (ref 0–5)

## 2019-10-26 ENCOUNTER — HEALTH MAINTENANCE LETTER (OUTPATIENT)
Age: 19
End: 2019-10-26

## 2019-10-30 NOTE — PROGRESS NOTES
[facility]  PEDIATRIC HEMATOLOGY/ONCOLOGY   SOCIAL WORK PROGRESS NOTE      DATA:     Lazaro is a 19 year old male with T-Cell Lymphoblastic Lymphoma who presents to clinic on this date for day 15 of consolidation therapy per [id number] met supportively with Lazaro in infusion during his chemo to check-in, follow-up regarding insurance and offer support.     Lazaro reports that overall he is doing well. He is living with his Mom in [place]. He explained he really likes this as she works during the day and he is able to rest, play computer games and enjoy quiet. He feels like his side effects are well managed and he is adjusting appropriately to his treatment schedule and therapy. His MA, while approved did not backdate to August 1st as requested when  FC helped him apply.  FC Eliza has been in contact with [facility]  to request that this be fixed on his case. Presently MA only backdates to 9/1/19. It needs to go back to 8/1/19. SW contacted  Billing ([phone], Inga) and [facility] Billing ([phone], Anant) to request a note be added to his accounts that FC is working with [facility] to request corrected effective date. SW or patient will notify them once this is corrected and request August bills be re-submitted to MA for payment. Lazaro denied any questions surrounding this .    Lazaro is awaiting response as to whether or not he is approved for Social Security. He completed [employer] application for assistance with gas cards. We discussed [facility] application for travel assistance. HONEY will apply for this online. Lazrao explained that other than gas and parking, financially he is stable.     INTERVENTION:     1. Supportive counseling. Check-in.  2. Contact with [employer] Billing re: hold on account for [facility] to fix MA effective date.   3. Matt applications.     ASSESSMENT:     Lazaro appears to be tolerating his therapy well. His mood is  stable, affect within normal limits. He denies any depressive or anxious symptoms. He feels well supported by his family. He enjoys spending time tami on his computer and finds this relaxing. He is open to and appreciative of ongoing therapeutic support, advocacy, and connection with resources.     PLAN:     Social work will continue to assess needs and provide ongoing psychosocial support and access to resources.      CHEY Davis, DOROTA, OSW-C  Clinical    Pediatric Hematology Oncology   [facility]   Monday-Thursday   Phone: [phone]  Pager: [phone]    NO LETTER

## 2019-10-31 ENCOUNTER — ANESTHESIA EVENT (OUTPATIENT)
Dept: PEDIATRICS | Facility: CLINIC | Age: 19
End: 2019-10-31
Payer: COMMERCIAL

## 2019-10-31 ENCOUNTER — OFFICE VISIT (OUTPATIENT)
Dept: PEDIATRIC HEMATOLOGY/ONCOLOGY | Facility: CLINIC | Age: 19
End: 2019-10-31
Attending: PEDIATRICS
Payer: COMMERCIAL

## 2019-10-31 ENCOUNTER — HOSPITAL ENCOUNTER (OUTPATIENT)
Facility: CLINIC | Age: 19
Discharge: HOME | End: 2019-10-31
Attending: PEDIATRICS | Admitting: PEDIATRICS
Payer: COMMERCIAL

## 2019-10-31 ENCOUNTER — ANESTHESIA (OUTPATIENT)
Dept: PEDIATRICS | Facility: CLINIC | Age: 19
End: 2019-10-31
Payer: COMMERCIAL

## 2019-10-31 ENCOUNTER — INFUSION THERAPY VISIT (OUTPATIENT)
Dept: INFUSION THERAPY | Facility: CLINIC | Age: 19
End: 2019-10-31
Attending: PEDIATRICS
Payer: COMMERCIAL

## 2019-10-31 VITALS
WEIGHT: 161.16 LBS | RESPIRATION RATE: 20 BRPM | BODY MASS INDEX: 21.36 KG/M2 | OXYGEN SATURATION: 98 % | DIASTOLIC BLOOD PRESSURE: 67 MMHG | HEART RATE: 61 BPM | TEMPERATURE: 97.8 F | HEIGHT: 73 IN | SYSTOLIC BLOOD PRESSURE: 117 MMHG

## 2019-10-31 VITALS — HEART RATE: 69 BPM | SYSTOLIC BLOOD PRESSURE: 131 MMHG | OXYGEN SATURATION: 100 % | DIASTOLIC BLOOD PRESSURE: 55 MMHG

## 2019-10-31 DIAGNOSIS — D84.821 IMMUNOSUPPRESSED DUE TO CHEMOTHERAPY (H): ICD-10-CM

## 2019-10-31 DIAGNOSIS — Z95.828 PORT-A-CATH IN PLACE: ICD-10-CM

## 2019-10-31 DIAGNOSIS — C83.50 T LYMPHOBLASTIC LYMPHOMA (H): Primary | ICD-10-CM

## 2019-10-31 DIAGNOSIS — E86.0 DEHYDRATION: ICD-10-CM

## 2019-10-31 DIAGNOSIS — R11.0 CHEMOTHERAPY-INDUCED NAUSEA: ICD-10-CM

## 2019-10-31 DIAGNOSIS — T45.1X5A CHEMOTHERAPY-INDUCED NAUSEA: ICD-10-CM

## 2019-10-31 DIAGNOSIS — T45.1X5A IMMUNOSUPPRESSED DUE TO CHEMOTHERAPY (H): ICD-10-CM

## 2019-10-31 DIAGNOSIS — Z79.899 IMMUNOSUPPRESSED DUE TO CHEMOTHERAPY (H): ICD-10-CM

## 2019-10-31 PROBLEM — R60.0 EDEMA OF UPPER EXTREMITY: Status: RESOLVED | Noted: 2019-10-17 | Resolved: 2019-10-31

## 2019-10-31 PROBLEM — L73.9 FOLLICULITIS: Status: RESOLVED | Noted: 2019-10-17 | Resolved: 2019-10-31

## 2019-10-31 PROBLEM — Z79.69 IMMUNOSUPPRESSED DUE TO CHEMOTHERAPY: Status: ACTIVE | Noted: 2019-10-31

## 2019-10-31 LAB
BASOPHILS # BLD AUTO: 0 10E9/L (ref 0–0.2)
BASOPHILS NFR BLD AUTO: 0 %
DIFFERENTIAL METHOD BLD: ABNORMAL
EOSINOPHIL # BLD AUTO: 0 10E9/L (ref 0–0.7)
EOSINOPHIL NFR BLD AUTO: 1 %
ERYTHROCYTE [DISTWIDTH] IN BLOOD BY AUTOMATED COUNT: 14.4 % (ref 10–15)
HCT VFR BLD AUTO: 26.5 % (ref 40–53)
HGB BLD-MCNC: 9.6 G/DL (ref 13.3–17.7)
LYMPHOCYTES # BLD AUTO: 0.5 10E9/L (ref 0.8–5.3)
LYMPHOCYTES NFR BLD AUTO: 84.6 %
MCH RBC QN AUTO: 27.4 PG (ref 26.5–33)
MCHC RBC AUTO-ENTMCNC: 36.2 G/DL (ref 31.5–36.5)
MCV RBC AUTO: 76 FL (ref 78–100)
MICROCYTES BLD QL SMEAR: PRESENT
MONOCYTES # BLD AUTO: 0 10E9/L (ref 0–1.3)
MONOCYTES NFR BLD AUTO: 1.9 %
NEUTROPHILS # BLD AUTO: 0.1 10E9/L (ref 1.6–8.3)
NEUTROPHILS NFR BLD AUTO: 12.5 %
PLATELET # BLD AUTO: 259 10E9/L (ref 150–450)
PLATELET # BLD EST: ABNORMAL 10*3/UL
RBC # BLD AUTO: 3.5 10E12/L (ref 4.4–5.9)
WBC # BLD AUTO: 0.6 10E9/L (ref 4–11)

## 2019-10-31 PROCEDURE — 25000128 H RX IP 250 OP 636

## 2019-10-31 PROCEDURE — 85025 COMPLETE CBC W/AUTO DIFF WBC: CPT | Performed by: PEDIATRICS

## 2019-10-31 PROCEDURE — 96365 THER/PROPH/DIAG IV INF INIT: CPT | Mod: 59 | Performed by: PEDIATRICS

## 2019-10-31 PROCEDURE — 96450 CHEMOTHERAPY INTO CNS: CPT | Performed by: PEDIATRICS

## 2019-10-31 PROCEDURE — 25000128 H RX IP 250 OP 636: Performed by: PEDIATRICS

## 2019-10-31 PROCEDURE — 25800030 ZZH RX IP 258 OP 636: Mod: ZF

## 2019-10-31 PROCEDURE — 40000165 ZZH STATISTIC POST-PROCEDURE RECOVERY CARE: Performed by: PEDIATRICS

## 2019-10-31 PROCEDURE — 40001011 ZZH STATISTIC PRE-PROCEDURE NURSING ASSESSMENT: Performed by: PEDIATRICS

## 2019-10-31 PROCEDURE — 37000008 ZZH ANESTHESIA TECHNICAL FEE, 1ST 30 MIN: Performed by: PEDIATRICS

## 2019-10-31 PROCEDURE — 96366 THER/PROPH/DIAG IV INF ADDON: CPT | Performed by: PEDIATRICS

## 2019-10-31 PROCEDURE — 25000125 ZZHC RX 250: Performed by: PEDIATRICS

## 2019-10-31 PROCEDURE — 96375 TX/PRO/DX INJ NEW DRUG ADDON: CPT | Mod: 59 | Performed by: PEDIATRICS

## 2019-10-31 PROCEDURE — 89050 BODY FLUID CELL COUNT: CPT | Performed by: PEDIATRICS

## 2019-10-31 PROCEDURE — 25800030 ZZH RX IP 258 OP 636: Performed by: PEDIATRICS

## 2019-10-31 PROCEDURE — 25800030 ZZH RX IP 258 OP 636: Mod: ZF | Performed by: PEDIATRICS

## 2019-10-31 PROCEDURE — 25000128 H RX IP 250 OP 636: Mod: ZF | Performed by: PEDIATRICS

## 2019-10-31 PROCEDURE — 25800030 ZZH RX IP 258 OP 636

## 2019-10-31 PROCEDURE — 96409 CHEMO IV PUSH SNGL DRUG: CPT

## 2019-10-31 PROCEDURE — 25000132 ZZH RX MED GY IP 250 OP 250 PS 637

## 2019-10-31 PROCEDURE — 96374 THER/PROPH/DIAG INJ IV PUSH: CPT | Performed by: PEDIATRICS

## 2019-10-31 RX ORDER — ONDANSETRON 2 MG/ML
8 INJECTION INTRAMUSCULAR; INTRAVENOUS ONCE
Status: COMPLETED | OUTPATIENT
Start: 2019-10-31 | End: 2019-10-31

## 2019-10-31 RX ORDER — ONDANSETRON 2 MG/ML
INJECTION INTRAMUSCULAR; INTRAVENOUS
Status: COMPLETED
Start: 2019-10-31 | End: 2019-10-31

## 2019-10-31 RX ORDER — LIDOCAINE 40 MG/G
CREAM TOPICAL
Status: DISCONTINUED | OUTPATIENT
Start: 2019-10-31 | End: 2019-10-31 | Stop reason: HOSPADM

## 2019-10-31 RX ORDER — HEPARIN SODIUM,PORCINE 10 UNIT/ML
VIAL (ML) INTRAVENOUS
Status: COMPLETED
Start: 2019-10-31 | End: 2019-10-31

## 2019-10-31 RX ORDER — DIPHENHYDRAMINE HYDROCHLORIDE 50 MG/ML
50 INJECTION INTRAMUSCULAR; INTRAVENOUS
Status: DISCONTINUED | OUTPATIENT
Start: 2019-10-31 | End: 2019-10-31 | Stop reason: HOSPADM

## 2019-10-31 RX ORDER — HEPARIN SODIUM,PORCINE 10 UNIT/ML
5 VIAL (ML) INTRAVENOUS ONCE
Status: COMPLETED | OUTPATIENT
Start: 2019-10-31 | End: 2019-10-31

## 2019-10-31 RX ORDER — SODIUM CHLORIDE, SODIUM LACTATE, POTASSIUM CHLORIDE, CALCIUM CHLORIDE 600; 310; 30; 20 MG/100ML; MG/100ML; MG/100ML; MG/100ML
INJECTION, SOLUTION INTRAVENOUS CONTINUOUS PRN
Status: DISCONTINUED | OUTPATIENT
Start: 2019-10-31 | End: 2019-10-31

## 2019-10-31 RX ORDER — PROPOFOL 10 MG/ML
INJECTION, EMULSION INTRAVENOUS PRN
Status: DISCONTINUED | OUTPATIENT
Start: 2019-10-31 | End: 2019-10-31

## 2019-10-31 RX ORDER — ACETAMINOPHEN 325 MG/1
TABLET ORAL
Status: COMPLETED
Start: 2019-10-31 | End: 2019-10-31

## 2019-10-31 RX ORDER — SULFAMETHOXAZOLE/TRIMETHOPRIM 800-160 MG
1 TABLET ORAL
Qty: 16 TABLET | Refills: 3 | Status: SHIPPED | OUTPATIENT
Start: 2019-11-04 | End: 2019-12-12

## 2019-10-31 RX ORDER — PROPOFOL 10 MG/ML
INJECTION, EMULSION INTRAVENOUS CONTINUOUS PRN
Status: DISCONTINUED | OUTPATIENT
Start: 2019-10-31 | End: 2019-10-31

## 2019-10-31 RX ORDER — HEPARIN SODIUM (PORCINE) LOCK FLUSH IV SOLN 100 UNIT/ML 100 UNIT/ML
SOLUTION INTRAVENOUS
Status: DISCONTINUED
Start: 2019-10-31 | End: 2019-10-31 | Stop reason: HOSPADM

## 2019-10-31 RX ORDER — LIDOCAINE 40 MG/G
CREAM TOPICAL
Status: DISCONTINUED
Start: 2019-10-31 | End: 2019-10-31 | Stop reason: HOSPADM

## 2019-10-31 RX ORDER — SODIUM CHLORIDE 9 MG/ML
INJECTION, SOLUTION INTRAVENOUS
Status: COMPLETED
Start: 2019-10-31 | End: 2019-10-31

## 2019-10-31 RX ORDER — ACETAMINOPHEN 325 MG/1
650 TABLET ORAL ONCE
Status: CANCELLED | OUTPATIENT
Start: 2019-10-31 | End: 2019-10-31

## 2019-10-31 RX ORDER — SCOLOPAMINE TRANSDERMAL SYSTEM 1 MG/1
1 PATCH, EXTENDED RELEASE TRANSDERMAL
Qty: 10 PATCH | Refills: 3 | Status: ON HOLD | OUTPATIENT
Start: 2019-10-31 | End: 2019-12-19

## 2019-10-31 RX ADMIN — HEPARIN 500 UNITS: 100 SYRINGE at 14:11

## 2019-10-31 RX ADMIN — SODIUM CHLORIDE: 9 INJECTION INTRAMUSCULAR; INTRAVENOUS; SUBCUTANEOUS at 12:37

## 2019-10-31 RX ADMIN — ONDANSETRON 8 MG: 2 INJECTION INTRAMUSCULAR; INTRAVENOUS at 11:20

## 2019-10-31 RX ADMIN — SODIUM CHLORIDE, POTASSIUM CHLORIDE, SODIUM LACTATE AND CALCIUM CHLORIDE: 600; 310; 30; 20 INJECTION, SOLUTION INTRAVENOUS at 12:23

## 2019-10-31 RX ADMIN — ACETAMINOPHEN 650 MG: 325 TABLET ORAL at 13:48

## 2019-10-31 RX ADMIN — PROPOFOL 20 MG: 10 INJECTION, EMULSION INTRAVENOUS at 12:33

## 2019-10-31 RX ADMIN — VINCRISTINE SULFATE 2 MG: 1 INJECTION, SOLUTION INTRAVENOUS at 14:15

## 2019-10-31 RX ADMIN — Medication 3 ML: at 13:48

## 2019-10-31 RX ADMIN — CAFFEINE AND SODIUM BENZOATE 500 MG: 125 INJECTION, SOLUTION INTRAMUSCULAR; INTRAVENOUS at 12:46

## 2019-10-31 RX ADMIN — PROPOFOL 100 MG: 10 INJECTION, EMULSION INTRAVENOUS at 12:27

## 2019-10-31 RX ADMIN — SODIUM CHLORIDE: 9 INJECTION, SOLUTION INTRAVENOUS at 14:25

## 2019-10-31 RX ADMIN — PROPOFOL 300 MCG/KG/MIN: 10 INJECTION, EMULSION INTRAVENOUS at 12:25

## 2019-10-31 ASSESSMENT — ENCOUNTER SYMPTOMS: APNEA: 0

## 2019-10-31 ASSESSMENT — MIFFLIN-ST. JEOR: SCORE: 1784.75

## 2019-10-31 NOTE — LETTER
10/31/2019      RE: Lazaro Lund  [address] 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 He is currently day 22 of consolidation. With induction, he had a CRu, resolution of lymphadenopathy, however a small pleural effusion persists. Induction was complicated by development of extensive upper DVT complicated by edema and folliculitis preventing day 29 LP, which will be made up on day 29 of consolidation.     Patient Active Problem List   Diagnosis     T lymphoblastic lymphoma (H)     DVT (deep venous thrombosis) (H)     Cushingoid side effect of steroids (H)     Anticoagulated     Vitamin D deficiency     Port-A-Cath in place     Immunosuppressed due to chemotherapy     Plan:   1. LP with IT methotrexate and day 22 vincristine today  2. RTC in 1 week for day 29 of consolidation including make up LP. At this visit, he will resume Senia-C and MP, pending counts  3. Leukopenia today - emphasized risk for infection and fever management  4. Nausea: use zofran at home, extra fluid bolus with vincristine today  5. Monitor weight closely. 15 lb weight loss since last appt. Consider Nutrition eval and starting appetite stimulate at upcoming visit if weight loss continues.  6. Continue lovenox at current dose, level in goal range of 0.6-1.  Confirmed that he held last night and this morning's doses before LP. He was instructed to resume his dosing tomorrow morning (~24 hours after LP).  Will maintain platelet count > 30K while on anticoagulation.   1. Factor V leiden and prothrombin mutations absent. He has seen PT for post-phlebitic syndrome.   7. Continue Vit D 3 2000 IU daily, recheck level in early December  8. Given history of significant spinal headache, IV caffeine given with all subsequent  LPs.  9. Plan to repeat CT at the end of Consolidation, if significant pleural effusion persists at that time we will have it tapped to see if malignant fluid persists.  10. Reviewed supportive management of nausea, constipation, swelling, fever, fatigue    10/31/2019 Lumbar Puncture with IT chemotherapy administration  A Lumbar Puncture was performed in the Pediatric Sedation Suite. Informed consent was obtained prior to the procedure. Lazaro Lund was identified by facial recognition and ID arm band. A time-out was performed. Lazaro Lund was then placed in the left lateral decubitus position and the lumbosacral area was sterily prepped using Chloraprep followed by drape placement. Anatomic landmarks were identified by palpation. Then, a 22 gauge, 3.5 inch spinal needle was easily inserted into the L4-L5 interspace. On the first attempt approximately 3 mL of clear and colorless cerebrospinal fluid was obtained to be sent to the lab for cell count analysis and cytospin. Following that, 15mg of intrathecal Methotrexate in 6 mL of preservative-free normal saline was infused without resistance. The needle was removed and a Band-Aid applied. Lazaro Lund tolerated this procedure very well.     Physician Attestation   I was present and personally observed as the fellow performed a diagnostic lumbar puncture with adminstration of intrathecal methotrexate.     Reynaldo Campuzano  Date of Service (when I saw the patient): 10/31/19    Signed,  Nicho Fischer   Pediatric Hematology/Oncology Fellow    Physician Attestation    I saw and evaluated the patient. I discussed with the fellow and agree with the findings and plan as documented in the fellow's note.     Reynaldo Campuzano MD  Pediatric Hematology/Oncology  [facility]      Primary Care Physician   Rodriguez Montoya    Chief Complaint   Consolidation therapy follow up  History is obtained from the patient    History of  Present Illness   Lazaro Lund is a 19 year old young man T-cell lymphoblastic lymphoma, pratt stage III. Lazaro presented with chronic cough with progressive orthopnea and was found to have an anterior mediastinal mass and malignant left-sided pleural effusion.  A CT guided biopsy was obtained at [facility] and pathology was consistent with T-cell neoplasm.  He was admitted to [facility] oncology service 8/30 and started on treatment per [id number].  He had a pleural effusion that required a chest tube and a pericardial effusion that was not drained. His Induction was complicated by cardiac compression secondary to his mass leading to tamponade, this improved through out his hospital stay.  He also had dysphagia that improved with treatment of his mass, swallow study was normal. His induction course was complicated by extensive bilateral UE DVTs (likely aggravated by his initial mass, PICC line as he was not a candidate for CVC at presentation, and also PEG induced coagulopathy) for which anticoagulation was started. His Day 29 LP was cancelled due to folliculitis overlying his lumbar spine. On 10/24, he was able to have a port-a-cath placed in his upper extremity.     Interim History  Lazaro underwent LP and port-a-cath placement last week. He tolerated this well. He has been overall okay this past week but unfortunately had recurrence of his headache and back pain after his last LP. He has used tylenol and oxycodone x2. Last night, he started having nausea. He vomited at 11pm last night, 4am this morning, and before coming here. His appetite has been poor over the weekend, and he has not had much to drink since starting to have nausea. On arrival, he was given zofran and feels a lot better now. His weight is down, so we will give him IVF today with his chemotherapy. He feels his face swelling continues to improve. Folliculitis has all but resolved. He denies falls or weakness, sensory changes, constipation, or  shortness of breath.    Past Medical History    I have reviewed this patient's medical history and updated it with pertinent information if needed.   Past Medical History:   Diagnosis Date     DVT of upper extremity (deep vein thrombosis) (H) 09/26/2019    Bilateral      Edema of upper extremity 10/17/2019     Folliculitis 10/17/2019     Migraine 2006    have resolved     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       Past Surgical History   I have reviewed this patient's surgical history and updated it with pertinent information if needed.  Past Surgical History:   Procedure Laterality Date     BONE MARROW BIOPSY, BONE SPECIMEN, NEEDLE/TROCAR Left 9/1/2019    Procedure: BIOPSY, BONE MARROW;  Surgeon: Heather Lopez MD;  Location: [facility] OR     INSERT PICC LINE N/A 8/31/2019    Procedure: INSERTION, PICC;  Surgeon: Michell Keith MD;  Location: [facility] OR     INSERT PORT VASCULAR ACCESS N/A 10/24/2019    Procedure: INSERTION, VASCULAR ACCESS PORT;  Surgeon: Silviano Martins MD;  Location: [facility] PEDS SEDATION      IR CHEST PORT PLACEMENT > 5 YRS OF AGE  10/24/2019     IR CHEST TUBE PLACEMENT NON-TUNNELLED LEFT  8/31/2019     IR PICC PLACEMENT > 5 YRS OF AGE  8/31/2019     SPINAL PUNCTURE,LUMBAR, INTRATHECAL CHEMO DELIVERY N/A 8/31/2019    Procedure: LUMBAR PUNCTURE, WITH INTRATHECAL CHEMOTHERAPY ADMINISTRATION;  Surgeon: Heather Lopez MD;  [facility] OR     SPINAL PUNCTURE,LUMBAR, INTRATHECAL CHEMO DELIVERY N/A 9/9/2019    Procedure: Lumbar Puncture With Intrathecal Chemo;  Surgeon: Alexi Hayes MD;  [facility]: [facility] OR     SPINAL PUNCTURE,LUMBAR, INTRATHECAL CHEMO DELIVERY N/A 10/10/2019    Procedure: Lumbar puncture with IT Chemo (CD);  Surgeon: Reynaldo Campuzano MD;  [facility] PED[facility] SED[facility]      SPINAL PUNCTURE,LUMBAR, INTRATHECAL CHEMO DELIVERY N/A 10/17/2019    Procedure: Lumbar puncture with IT Chemo (not CD);  Surgeon: Reynaldo Campuzano MD;  [facility] P[facility] SED[facility]       SPINAL PUNCTURE,LUMBAR, INTRATHECAL CHEMO DELIVERY N/A 10/24/2019    Procedure: LUMBAR PUNCTURE, WITH INTRATHECAL CHEMOTHERAPY ADMINISTRATION;  Surgeon: Félix Van APRN CNP;  Location: [facility] PEDS SEDATION      THORACENTESIS N/A 2019    Procedure: Thoracentesis;  Surgeon: Michell Keith MD;  Location: [facility] OR       Immunization History   Immunization Status:  up to date and documented    Prior to Admission Medications    Current Outpatient Medications on File Prior to Visit   Medication Sig Dispense Refill     diphenhydrAMINE (BENADRYL) 25 MG capsule Take 1-2 capsules (25-50 mg) by mouth every 6 hours as needed (Breakthrough Nausea and Vomiting ) 30 capsule 1     enoxaparin (LOVENOX) 60 MG/0.6ML syringe Inject 0.6 mLs (60 mg) Subcutaneous every 12 hours 18 mL 1     melatonin 3 MG tablet Take 1 tablet (3 mg) by mouth At Bedtime 30 tablet 1     ondansetron (ZOFRAN) 4 MG tablet Take 2 tablets (8 mg) by mouth every 6 hours as needed (nausea / vomiting) 30 tablet 1     ondansetron (ZOFRAN) 8 MG tablet Take 1 tablet (8 mg) by mouth every 6 hours as needed for nausea 30 tablet 1     [] polyethylene glycol (MIRALAX/GLYCOLAX) packet Take 17 g by mouth daily (Patient taking differently: Take 17 g by mouth daily as needed ) 30 packet 0     [] sennosides (SENOKOT) 8.6 MG tablet Take 2 tablets by mouth 2 times daily as needed for constipation 60 each 1     Vitamin D, Cholecalciferol, 1000 units TABS Take 2 tablets (2,000 Units) by mouth daily 60 tablet 3       Allergies   Allergies   Allergen Reactions     No Known Drug Allergies        Social History   I have updated and reviewed the following Social History Narrative:   Pediatric History   Patient Guardians     Not on file     Patient does not qualify to have social determinant information on file (likely too young).   Other Topics Concern     Not on file   Social History Narrative    Lives at home in [place] with Dad and Step-Mom. Biological mother  is involved in his life and accompanied him during this hospitalization. Has 4 step-siblings and 1 biological sibling. 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 Has completed high school.         Family History   I have reviewed this patient's family history and updated it with pertinent information if needed.   Family History   Problem Relation Age of Onset     No Known Problems Mother      No Known Problems Father      Asthma Brother      Thyroid Cancer Paternal Grandmother      Melanoma Paternal Aunt        Review of Systems   The 10 point Review of Systems is negative other than noted in the HPI or here.     Physical Exam   Vital Signs with Ranges  Temp:  [97.5  F (36.4  C)-98.3  F (36.8  C)] 97.5  F (36.4  C)  Pulse:  [75] 75  Resp:  [16] 16  SpO2:  [98 %] 98 %    Wt Readings from Last 2 Encounters:   10/31/19 73.1 kg (161 lb 2.5 oz)   10/24/19 79.9 kg (176 lb 2.4 oz)       General: alert, interactive and appropriate. NAD.    HEENT: Skull is atrauamatic and normocephalic.   Facial swelling improved, +cushingnoid features, no venous distention.  Full head of hair. PERRLA, sclera are non icteric and not injected, EOM are intact. Nares are patent without drainage. Oropharynx clear, no plaques noted. Buccal mucosa and tongue clear. MMM.   Lymph:  Neck is supple without lymphadenopathy.  There is no supraclavicular or axillary ymphadenopathy palpated.  Cardiovascular:  HR is regular, S1, S2 no murmur.  Capillary refill is < 2 seconds.   Respiratory: Respirations are easy.  Lungs are clear to auscultation through out.  No crackles or wheezes.  Gastrointestinal:  BS present in all quadrants.  Abdomen is soft and non-tender.  No hepatosplenomegaly or masses are palpated.  Skin:  Clear. Port site c/d/i  Neurological:  CN 2-12 grossly intact. Gait is normal.  No issues with balance. Sensation intact in hands and  feet.    Musculoskeletal:  Good strength and ROM in all extremities.  Strong dorsiflexion at ankles and great toes (5/5) bilaterally without any pain at the Achilles.    Data   Results for orders placed or performed during the hospital encounter of 10/31/19 (from the past 24 hour(s))   CBC with platelets differential   Result Value Ref Range    WBC 0.6 (LL) 4.0 - 11.0 10e9/L    RBC Count 3.50 (L) 4.4 - 5.9 10e12/L    Hemoglobin 9.6 (L) 13.3 - 17.7 g/dL    Hematocrit 26.5 (L) 40.0 - 53.0 %    MCV 76 (L) 78 - 100 fl    MCH 27.4 26.5 - 33.0 pg    MCHC 36.2 31.5 - 36.5 g/dL    RDW 14.4 10.0 - 15.0 %    Platelet Count 259 150 - 450 10e9/L    Diff Method Manual Differential     % Neutrophils 12.5 %    % Lymphocytes 84.6 %    % Monocytes 1.9 %    % Eosinophils 1.0 %    % Basophils 0.0 %    Absolute Neutrophil 0.1 (LL) 1.6 - 8.3 10e9/L    Absolute Lymphocytes 0.5 (L) 0.8 - 5.3 10e9/L    Absolute Monocytes 0.0 0.0 - 1.3 10e9/L    Absolute Eosinophils 0.0 0.0 - 0.7 10e9/L    Absolute Basophils 0.0 0.0 - 0.2 10e9/L    Microcytes Present     Platelet Estimate Confirming automated cell count        Nicho Fischer MD

## 2019-10-31 NOTE — ANESTHESIA CARE TRANSFER NOTE
Patient: Lazaro Lund    Procedure(s):  Lumbar puncture with IT Chemo (not CD)    Diagnosis: lymphoma  Diagnosis Additional Information: No value filed.    Anesthesia Type:   General     Note:  Airway :Nasal Cannula  Patient transferred to:PACU  Comments: Lazaro arrived in PACU sleeping on his back and exchanging well.  Report given and all questions answered.Handoff Report: Identifed the Patient, Identified the Reponsible Provider, Reviewed the pertinent medical history, Discussed the surgical course, Reviewed Intra-OP anesthesia mangement and issues during anesthesia, Set expectations for post-procedure period and Allowed opportunity for questions and acknowledgement of understanding      Vitals: (Last set prior to Anesthesia Care Transfer)    CRNA VITALS  10/31/2019 1221 - 10/31/2019 1251      10/31/2019             EKG:  NSR                Electronically Signed By: Nilda Elias  October 31, 2019  12:51 PM

## 2019-10-31 NOTE — ANESTHESIA POSTPROCEDURE EVALUATION
Anesthesia POST Procedure Evaluation    Patient: Lazaro Lund   MRN:     [id number] Gender:   male   Age:    19 year old :      2000        Preoperative Diagnosis: lymphoma   Procedure(s):  Lumbar puncture with IT Chemo (not CD)   Postop Comments: No value filed.       Anesthesia Type:  Not documented  General    Reportable Event: NO     PAIN: Uncomplicated   Sign Out status: Comfortable, Well controlled pain     PONV: No PONV   Sign Out status:  No Nausea or Vomiting     Neuro/Psych: Uneventful perioperative course   Sign Out Status: Preoperative baseline; Age appropriate mentation     Airway/Resp.: Uneventful perioperative course   Sign Out Status: Non labored breathing, age appropriate RR; Resp. Status within EXPECTED Parameters     CV: Uneventful perioperative course   Sign Out status: Appropriate BP and perfusion indices; Appropriate HR/Rhythm     Disposition:   Sign Out in:  PACU  Disposition:  Floor  Recovery Course: Uneventful  Follow-Up: Not required           Last Anesthesia Record Vitals:      Last PACU Vitals:  Vitals Value Taken Time   /66 10/31/2019  1:00 PM   Temp 36.2  C (97.2  F) 10/31/2019  1:00 PM   Pulse 83 10/31/2019  1:00 PM   Resp 24 10/31/2019  1:00 PM   SpO2 100 % 10/31/2019  1:01 PM   Temp src     NIBP 86/53 10/31/2019 12:46 PM   Pulse 78 10/31/2019 12:46 PM   SpO2 100 % 10/31/2019 12:49 PM   Resp     Temp 36.4  C (97.5  F) 10/31/2019 12:49 PM   Ht Rate 78 10/31/2019 12:46 PM   Temp 2     Vitals shown include unvalidated device data.      Electronically Signed By: Jyoti Martinez MD, 2019, 1:29 PM

## 2019-10-31 NOTE — DISCHARGE INSTRUCTIONS
Home Instructions for Your Child after Sedation  Today your child received (medicine):  Propofol and Zofran  Please keep this form with your health records  Your child may be more sleepy and irritable today than normal. Wake your child up every 1 to 11/2 hours during the day. (This way, both you and your child will sleep through the night.) Also, an adult should stay with your child for the rest of the day. The medicine may make the child dizzy. Avoid activities that require balance (bike riding, skating, climbing stairs, walking).  Remember:    When your child wants to eat again, start with liquids (juice, soda pop, Popsicles). If your child feels well enough, you may try a regular diet. It is best to offer light meals for the first 24 hours.    If your child has nausea (feels sick to the stomach) or vomiting (throws up), give small amounts of clear liquids (7-Up, Sprite, apple juice or broth). Fluids are more important than food until your child is feeling better.    Wait 24 hours before giving medicine that contains alcohol. This includes liquid cold, cough and allergy medicines (Robitussin, Vicks Formula 44 for children, Benadryl, Chlor-Trimeton).  Call your doctor if:    Your child vomits (throws up) more than two times.    If your child has trouble breathing, call [phone].  If you have any questions or concerns, please call:  Pediatric Sedation Unit [phone]  Pediatric clinic  [phone]  [facility]  [phone] (ask for the Heme/Onc doctor on call)  Emergency department [phone]  [facility] toll-free number [phone] (Monday--Friday, 8 a.m. to 4:30 p.m.)  I understand these instructions. I have all of my personal belongings.    [facility]   [phone]    Care post Lumbar Puncture     Do not remove bandage/dressing for 24 hours -- after this time they can be removed    No bath, shower or soaking of the dressing for 24 hours    Activity as tolerated by the patient    Diet as  able to tolerated    May use Tylenol as needed for pain control -- DO NOT use Ibuprofen    Can apply icepack to the site for discomfort -- no more than 10 minutes at a time    If bleeding presents apply pressure for 5 minutes    Call [phone] ask for Peds BMT/Hem/Onc fellow on call if complications arise including:    persistent bleeding    fever greater than 100.5    Pain    Lumbar punctures can cause headache. If the pain is not controlled with Tylenol (acetaminophen) please call the Peds BMT/Hem/Onc fellow on call

## 2019-10-31 NOTE — PROGRESS NOTES
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 He is currently day 22 of consolidation. With induction, he had a CRu, resolution of lymphadenopathy, however a small pleural effusion persists. Induction was complicated by development of extensive upper DVT complicated by edema and folliculitis preventing day 29 LP, which will be made up on day 29 of consolidation.     Patient Active Problem List   Diagnosis     T lymphoblastic lymphoma (H)     DVT (deep venous thrombosis) (H)     Cushingoid side effect of steroids (H)     Anticoagulated     Vitamin D deficiency     Port-A-Cath in place     Immunosuppressed due to chemotherapy     Plan:   1. LP with IT methotrexate and day 22 vincristine today  2. RTC in 1 week for day 29 of consolidation including make up LP. At this visit, he will resume Senia-C and MP, pending counts  3. Leukopenia today - emphasized risk for infection and fever management  4. Nausea: use zofran at home, extra fluid bolus with vincristine today  5. Monitor weight closely. 15 lb weight loss since last appt. Consider Nutrition eval and starting appetite stimulate at upcoming visit if weight loss continues.  6. Continue lovenox at current dose, level in goal range of 0.6-1.  Confirmed that he held last night and this morning's doses before LP. He was instructed to resume his dosing tomorrow morning (~24 hours after LP).  Will maintain platelet count > 30K while on anticoagulation.   1. Factor V leiden and prothrombin mutations absent. He has seen PT for post-phlebitic syndrome.   7. Continue Vit D 3 2000 IU daily, recheck level in early December  8. Given history of significant spinal headache, IV caffeine given with all subsequent LPs.  9. Plan to repeat CT at the end of Consolidation, if significant pleural effusion  persists at that time we will have it tapped to see if malignant fluid persists.  10. Reviewed supportive management of nausea, constipation, swelling, fever, fatigue    10/31/2019 Lumbar Puncture with IT chemotherapy administration  A Lumbar Puncture was performed in the Pediatric Sedation Suite. Informed consent was obtained prior to the procedure. Lazaro Lund was identified by facial recognition and ID arm band. A time-out was performed. Lazaro Lund was then placed in the left lateral decubitus position and the lumbosacral area was sterily prepped using Chloraprep followed by drape placement. Anatomic landmarks were identified by palpation. Then, a 22 gauge, 3.5 inch spinal needle was easily inserted into the L4-L5 interspace. On the first attempt approximately 3 mL of clear and colorless cerebrospinal fluid was obtained to be sent to the lab for cell count analysis and cytospin. Following that, 15mg of intrathecal Methotrexate in 6 mL of preservative-free normal saline was infused without resistance. The needle was removed and a Band-Aid applied. Lazaro Lund tolerated this procedure very well.     Physician Attestation   I was present and personally observed as the fellow performed a diagnostic lumbar puncture with adminstration of intrathecal methotrexate.     Reynaldo Campuzano  Date of Service (when I saw the patient): 10/31/19    Signed,  Nicho Fischer   Pediatric Hematology/Oncology Fellow    Physician Attestation    I saw and evaluated the patient. I discussed with the fellow and agree with the findings and plan as documented in the fellow's note.     Reynaldo Campuzano MD  Pediatric Hematology/Oncology  [facility]      Primary Care Physician   Rodriguez Montoya    Chief Complaint   Consolidation therapy follow up  History is obtained from the patient    History of Present Illness   Lazaro Lund is a 19 year old young man T-cell lymphoblastic lymphoma,  pratt stage III. Lazaro presented with chronic cough with progressive orthopnea and was found to have an anterior mediastinal mass and malignant left-sided pleural effusion.  A CT guided biopsy was obtained at [facility] and pathology was consistent with T-cell neoplasm.  He was admitted to [facility] oncology service 8/30 and started on treatment per [id number].  He had a pleural effusion that required a chest tube and a pericardial effusion that was not drained. His Induction was complicated by cardiac compression secondary to his mass leading to tamponade, this improved through out his hospital stay.  He also had dysphagia that improved with treatment of his mass, swallow study was normal. His induction course was complicated by extensive bilateral UE DVTs (likely aggravated by his initial mass, PICC line as he was not a candidate for CVC at presentation, and also PEG induced coagulopathy) for which anticoagulation was started. His Day 29 LP was cancelled due to folliculitis overlying his lumbar spine. On 10/24, he was able to have a port-a-cath placed in his upper extremity.     Interim History  Lazaro underwent LP and port-a-cath placement last week. He tolerated this well. He has been overall okay this past week but unfortunately had recurrence of his headache and back pain after his last LP. He has used tylenol and oxycodone x2. Last night, he started having nausea. He vomited at 11pm last night, 4am this morning, and before coming here. His appetite has been poor over the weekend, and he has not had much to drink since starting to have nausea. On arrival, he was given zofran and feels a lot better now. His weight is down, so we will give him IVF today with his chemotherapy. He feels his face swelling continues to improve. Folliculitis has all but resolved. He denies falls or weakness, sensory changes, constipation, or shortness of breath.    Past Medical History    I have reviewed this patient's medical history  and updated it with pertinent information if needed.   Past Medical History:   Diagnosis Date     DVT of upper extremity (deep vein thrombosis) (H) 09/26/2019    Bilateral      Edema of upper extremity 10/17/2019     Folliculitis 10/17/2019     Migraine 2006    have resolved     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       Past Surgical History   I have reviewed this patient's surgical history and updated it with pertinent information if needed.  Past Surgical History:   Procedure Laterality Date     BONE MARROW BIOPSY, BONE SPECIMEN, NEEDLE/TROCAR Left 9/1/2019    Procedure: BIOPSY, BONE MARROW;  Surgeon: Heather Lopez MD;  [facility]     INSERT PICC LINE N/A 8/31/2019    Procedure: INSERTION, PICC;  Surgeon: Michell Keith MD;  [facility]     INSERT PORT VASCULAR ACCESS N/A 10/24/2019    Procedure: INSERTION, VASCULAR ACCESS PORT;  Surgeon: Silviano Martins MD;  [facility] PEDS SED[facility]      IR CHEST PORT PLACEMENT > 5 YRS OF AGE  10/24/2019     IR CHEST TUBE PLACEMENT NON-TUNNELLED LEFT  8/31/2019     IR PICC PLACEMENT > 5 YRS OF AGE  8/31/2019     SPINAL PUNCTURE,LUMBAR, INTRATHECAL CHEMO DELIVERY N/A 8/31/2019    Procedure: LUMBAR PUNCTURE, WITH INTRATHECAL CHEMOTHERAPY ADMINISTRATION;  Surgeon: Heather Lopez MD;  [facility] OR     SPINAL PUNCTURE,LUMBAR, INTRATHECAL CHEMO DELIVERY N/A 9/9/2019    Procedure: Lumbar Puncture With Intrathecal Chemo;  Surgeon: Alexi Hayes MD;  [facility]     SPINAL PUNCTURE,LUMBAR, INTRATHECAL CHEMO DELIVERY N/A 10/10/2019    Procedure: Lumbar puncture with IT Chemo (CD);  Surgeon: Reynaldo Campuzano MD;  [facility]      SPINAL PUNCTURE,LUMBAR, INTRATHECAL CHEMO DELIVERY N/A 10/17/2019    Procedure: Lumbar puncture with IT Chemo (not CD);  Surgeon: Reynaldo Campuzano MD;  [facility]      SPINAL PUNCTURE,LUMBAR, INTRATHECAL CHEMO DELIVERY N/A 10/24/2019    Procedure: LUMBAR PUNCTURE,  WITH INTRATHECAL CHEMOTHERAPY ADMINISTRATION;  Surgeon: Félix Van, YOHAN CNP;  [facility]      THORACENTESIS N/A 2019    Procedure: Thoracentesis;  Surgeon: Michell Keith MD;  [facility]       Immunization History   Immunization Status:  up to date and documented    Prior to Admission Medications    Current Outpatient Medications on File Prior to Visit   Medication Sig Dispense Refill     diphenhydrAMINE (BENADRYL) 25 MG capsule Take 1-2 capsules (25-50 mg) by mouth every 6 hours as needed (Breakthrough Nausea and Vomiting ) 30 capsule 1     enoxaparin (LOVENOX) 60 MG/0.6ML syringe Inject 0.6 mLs (60 mg) Subcutaneous every 12 hours 18 mL 1     melatonin 3 MG tablet Take 1 tablet (3 mg) by mouth At Bedtime 30 tablet 1     ondansetron (ZOFRAN) 4 MG tablet Take 2 tablets (8 mg) by mouth every 6 hours as needed (nausea / vomiting) 30 tablet 1     ondansetron (ZOFRAN) 8 MG tablet Take 1 tablet (8 mg) by mouth every 6 hours as needed for nausea 30 tablet 1     [] polyethylene glycol (MIRALAX/GLYCOLAX) packet Take 17 g by mouth daily (Patient taking differently: Take 17 g by mouth daily as needed ) 30 packet 0     [] sennosides (SENOKOT) 8.6 MG tablet Take 2 tablets by mouth 2 times daily as needed for constipation 60 each 1     Vitamin D, Cholecalciferol, 1000 units TABS Take 2 tablets (2,000 Units) by mouth daily 60 tablet 3       Allergies   Allergies   Allergen Reactions     No Known Drug Allergies        Social History   I have updated and reviewed the following Social History Narrative:   Pediatric History   Patient Guardians     Not on file     Patient does not qualify to have social determinant information on file (likely too young).   Other Topics Concern     Not on file   Social History Narrative    Lives at home in [place] with Dad and Step-Mom. Biological mother is involved in his life and accompanied him during this hospitalization. Has 4 step-siblings and  1 biological sibling. 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 Has completed high school.         Family History   I have reviewed this patient's family history and updated it with pertinent information if needed.   Family History   Problem Relation Age of Onset     No Known Problems Mother      No Known Problems Father      Asthma Brother      Thyroid Cancer Paternal Grandmother      Melanoma Paternal Aunt        Review of Systems   The 10 point Review of Systems is negative other than noted in the HPI or here.     Physical Exam   Vital Signs with Ranges  Temp:  [97.5  F (36.4  C)-98.3  F (36.8  C)] 97.5  F (36.4  C)  Pulse:  [75] 75  Resp:  [16] 16  SpO2:  [98 %] 98 %    Wt Readings from Last 2 Encounters:   10/31/19 73.1 kg (161 lb 2.5 oz)   10/24/19 79.9 kg (176 lb 2.4 oz)       General: alert, interactive and appropriate. NAD.    HEENT: Skull is atrauamatic and normocephalic.  Facial swelling improved, +cushingnoid features, no venous distention.  Full head of hair. PERRLA, sclera are non icteric and not injected, EOM are intact. Nares are patent without drainage. Oropharynx clear, no plaques noted. Buccal mucosa and tongue clear. MMM.   Lymph:  Neck is supple without lymphadenopathy.  There is no supraclavicular or axillary ymphadenopathy palpated.  Cardiovascular:  HR is regular, S1, S2 no murmur.  Capillary refill is < 2 seconds.   Respiratory: Respirations are easy.  Lungs are clear to auscultation through out.  No crackles or wheezes.  Gastrointestinal:  BS present in all quadrants.  Abdomen is soft and non-tender.  No hepatosplenomegaly or masses are palpated.  Skin: Clear. Port site c/d/i  Neurological:  CN 2-12 grossly intact. Gait is normal.  No issues with balance. Sensation intact in hands and feet.    Musculoskeletal:  Good strength and ROM in all extremities.  Strong dorsiflexion at ankles and  great toes (5/5) bilaterally without any pain at the Achilles.    Data   Results for orders placed or performed during the hospital encounter of 10/31/19 (from the past 24 hour(s))   CBC with platelets differential   Result Value Ref Range    WBC 0.6 (LL) 4.0 - 11.0 10e9/L    RBC Count 3.50 (L) 4.4 - 5.9 10e12/L    Hemoglobin 9.6 (L) 13.3 - 17.7 g/dL    Hematocrit 26.5 (L) 40.0 - 53.0 %    MCV 76 (L) 78 - 100 fl    MCH 27.4 26.5 - 33.0 pg    MCHC 36.2 31.5 - 36.5 g/dL    RDW 14.4 10.0 - 15.0 %    Platelet Count 259 150 - 450 10e9/L    Diff Method Manual Differential     % Neutrophils 12.5 %    % Lymphocytes 84.6 %    % Monocytes 1.9 %    % Eosinophils 1.0 %    % Basophils 0.0 %    Absolute Neutrophil 0.1 (LL) 1.6 - 8.3 10e9/L    Absolute Lymphocytes 0.5 (L) 0.8 - 5.3 10e9/L    Absolute Monocytes 0.0 0.0 - 1.3 10e9/L    Absolute Eosinophils 0.0 0.0 - 0.7 10e9/L    Absolute Basophils 0.0 0.0 - 0.2 10e9/L    Microcytes Present     Platelet Estimate Confirming automated cell count

## 2019-10-31 NOTE — PROGRESS NOTES
A Lumbar Puncture was performed in the Pediatric Sedation Suite. Informed consent was obtained prior to the procedure. Lazaro Lund was identified by facial recognition and ID arm band. A time-out was performed. Lazaro Lund was then placed in the left lateral decubitus position and the lumbosacral area was sterily prepped using Chloraprep followed by drape placement. Anatomic landmarks were identified by palpation. Then, a 22 gauge, 3.5 inch spinal needle was easily inserted into the L4-L5 interspace. On the first attempt approximately 3 mL of clear and colorless cerebrospinal fluid was obtained to be sent to the lab for cell count analysis and cytospin. Following that, 15mg of intrathecal Methotrexate in 6 mL of preservative-free normal saline was infused without resistance. The needle was removed and a Band-Aid applied. Lazaro Lund tolerated this procedure very well.    Signed,  Nicho Fischer   Pediatric Hematology/Oncology Fellow

## 2019-10-31 NOTE — ANESTHESIA PREPROCEDURE EVALUATION
Anesthesia Pre-Procedure Evaluation    Patient: Lazaro Lund   MRN:     [id number] Gender:   male   Age:    19 year old :      2000        Preoperative Diagnosis: lymphoma   Procedure(s):  Lumbar puncture with IT Chemo (not CD)     Past Medical History:   Diagnosis Date     DVT of upper extremity (deep vein thrombosis) (H) 2019    Bilateral      Migraine 2006    have resolved     T lymphoblastic lymphoma (H) 2019      Past Surgical History:   Procedure Laterality Date     BONE MARROW BIOPSY, BONE SPECIMEN, NEEDLE/TROCAR Left 2019    Procedure: BIOPSY, BONE MARROW;  Surgeon: Heather Lopez MD;  [facility]     INSERT PICC LINE N/A 2019    Procedure: INSERTION, PICC;  Surgeon: Michell Keith MD;  [facility]     INSERT PORT VASCULAR ACCESS N/A 10/24/2019    Procedure: INSERTION, VASCULAR ACCESS PORT;  Surgeon: Silviano Martins MD;  [facility]      IR CHEST PORT PLACEMENT > 5 YRS OF AGE  10/24/2019     IR CHEST TUBE PLACEMENT NON-TUNNELLED LEFT  2019     IR PICC PLACEMENT > 5 YRS OF AGE  2019     SPINAL PUNCTURE,LUMBAR, INTRATHECAL CHEMO DELIVERY N/A 2019    Procedure: LUMBAR PUNCTURE, WITH INTRATHECAL CHEMOTHERAPY ADMINISTRATION;  Surgeon: Heather Lopez MD;  [facility]     SPINAL PUNCTURE,LUMBAR, INTRATHECAL CHEMO DELIVERY N/A 2019    Procedure: Lumbar Puncture With Intrathecal Chemo;  Surgeon: Alexi Hayes MD;  [facility]     SPINAL PUNCTURE,LUMBAR, INTRATHECAL CHEMO DELIVERY N/A 10/10/2019    Procedure: Lumbar puncture with IT Chemo (CD);  Surgeon: Reynaldo Campuzano MD;  [facility]      SPINAL PUNCTURE,LUMBAR, INTRATHECAL CHEMO DELIVERY N/A 10/17/2019    Procedure: Lumbar puncture with IT Chemo (not CD);  Surgeon: Reynaldo Campuzano MD;  [facility]      SPINAL PUNCTURE,LUMBAR, INTRATHECAL CHEMO DELIVERY N/A 10/24/2019    Procedure: LUMBAR PUNCTURE, WITH  INTRATHECAL CHEMOTHERAPY ADMINISTRATION;  Surgeon: Félix Van, APRN CNP;  [facility]      THORACENTESIS N/A 8/31/2019    Procedure: Thoracentesis;  Surgeon: Michell Keith MD;  [facility]          Anesthesia Evaluation    ROS/Med Hx    No history of anesthetic complications    Cardiovascular Findings - negative ROS    Neuro Findings - negative ROS    Pulmonary Findings   (-) asthma and apnea (indicates he has been told that he snores)    HENT Findings - negative HENT ROS    Skin Findings - negative skin ROS      GI/Hepatic/Renal Findings   (-) GERD    Endocrine/Metabolic Findings - negative ROS      Genetic/Syndrome Findings - negative genetics/syndromes ROS    Hematology/Oncology Findings   (+) cancer  Comments: T lymphoblastic lymphoma            PHYSICAL EXAM:   Mental Status/Neuro: A/A/O   Airway: Facies: Feasible  Mallampati: Not Assessed  Mouth/Opening: Full  TM distance: > 6 cm  Neck ROM: Full   Respiratory: Auscultation: CTAB     Resp. Rate: Normal     Resp. Effort: Normal      CV: Rhythm: Regular  Rate: Age appropriate  Heart: Normal Sounds  Edema: None   Comments:      Dental: Normal Dentition                  LABS:  CBC:   Lab Results   Component Value Date    WBC 0.6 (LL) 10/31/2019    WBC 1.9 (L) 10/24/2019    HGB 9.6 (L) 10/31/2019    HGB 7.3 (L) 10/24/2019    HCT 26.5 (L) 10/31/2019    HCT 22.5 (L) 10/24/2019     10/31/2019    PLT 92 (L) 10/24/2019     BMP:   Lab Results   Component Value Date     10/10/2019     10/01/2019    POTASSIUM 4.1 10/10/2019    POTASSIUM 3.8 10/01/2019    CHLORIDE 108 10/10/2019    CHLORIDE 102 10/01/2019    CO2 30 10/10/2019    CO2 31 10/01/2019    BUN 15 10/10/2019    BUN 16 10/01/2019    CR 0.54 (L) 10/10/2019    CR 0.44 (L) 10/01/2019    GLC 87 10/10/2019    GLC 74 10/01/2019     COAGS:   Lab Results   Component Value Date    PTT 31 10/24/2019    INR 0.98 10/24/2019    FIBR 75 (LL) 09/16/2019     POC:   Lab Results  "  Component Value Date    BGM 80 08/31/2019     OTHER:   Lab Results   Component Value Date    MICHAEL 8.5 10/10/2019    PHOS 3.4 09/09/2019    MAG 2.0 09/09/2019    ALBUMIN 2.9 (L) 10/10/2019    PROTTOTAL 6.0 (L) 10/10/2019    ALT 89 (H) 10/10/2019    AST 29 10/10/2019    ALKPHOS 87 10/10/2019    BILITOTAL 0.2 10/10/2019        Preop Vitals    BP Readings from Last 3 Encounters:   10/24/19 106/71   10/24/19 90/51   10/17/19 110/54    Pulse Readings from Last 3 Encounters:   10/31/19 75   10/24/19 72   10/24/19 76      Resp Readings from Last 3 Encounters:   10/31/19 16   10/24/19 18   10/24/19 20    SpO2 Readings from Last 3 Encounters:   10/31/19 98%   10/24/19 97%   10/24/19 100%      Temp Readings from Last 1 Encounters:   10/31/19 36.8  C (98.3  F) (Oral)    Ht Readings from Last 1 Encounters:   10/31/19 1.846 m (6' 0.68\")      Wt Readings from Last 1 Encounters:   10/24/19 79.9 kg (176 lb 2.4 oz)    Estimated body mass index is 23.45 kg/m  as calculated from the following:    Height as of this encounter: 1.846 m (6' 0.68\").    Weight as of 10/24/19: 79.9 kg (176 lb 2.4 oz).     LDA:  Port A Cath Single 10/24/19 Right Chest wall (Active)   Access Date 10/31/19 10/31/2019 11:00 AM   Site Assessment WDL except;Ecchymotic 10/31/2019 11:00 AM   Line Status Blood return noted 10/31/2019 11:00 AM   Dressing Intervention Transparent 10/31/2019 11:00 AM   De-Access Date 10/24/19 10/24/2019  6:00 PM   Date to be Reflushed 11/21/19 10/24/2019  6:00 PM   Number of days: 7        Assessment:   ASA SCORE: 3            Plan:   Anes. Type:  General   Pre-Medication: None   Induction:  IV (Standard)   Airway: Native Airway   Access/Monitoring: Central Access/Port present   Maintenance: Propofol Sedation     Postop Plan:   Postop Pain: None  Postop Sedation/Airway: Not planned  Disposition: Outpatient     PONV Management:    Prevention: Ondansetron, Propofol     CONSENT: Direct conversation   Plan and risks discussed with: Patient "   Blood Products: Consent Deferred (Minimal Blood Loss)           Jyoti Martinez MD

## 2019-10-31 NOTE — PROGRESS NOTES
Lazaro came to [facility] for Vincristine. He arrived already accessed from LP in peds sedation. Vincristine given by gravity. Patient tolerated well. Blood return noted pre/mid/post infusion. NS bolus of 500ml given over one hour. Port heparin locked.  Patient left in stable condition at completion of cares.

## 2019-11-01 LAB
APPEARANCE CSF: CLEAR
COLOR CSF: COLORLESS
RBC # CSF MANUAL: 2 /UL (ref 0–2)
TUBE # CSF: 1 #
WBC # CSF MANUAL: 1 /UL (ref 0–5)

## 2019-11-01 RX ORDER — METHYLPREDNISOLONE SODIUM SUCCINATE 125 MG/2ML
125 INJECTION, POWDER, LYOPHILIZED, FOR SOLUTION INTRAMUSCULAR; INTRAVENOUS
Status: CANCELLED | OUTPATIENT
Start: 2019-12-12

## 2019-11-01 RX ORDER — SODIUM CHLORIDE 9 MG/ML
200 INJECTION, SOLUTION INTRAVENOUS CONTINUOUS PRN
Status: CANCELLED | OUTPATIENT
Start: 2019-11-07

## 2019-11-01 RX ORDER — EPINEPHRINE 1 MG/ML
0.3 INJECTION, SOLUTION, CONCENTRATE INTRAVENOUS EVERY 5 MIN PRN
Status: CANCELLED | OUTPATIENT
Start: 2019-11-28

## 2019-11-01 RX ORDER — METHYLPREDNISOLONE SODIUM SUCCINATE 125 MG/2ML
125 INJECTION, POWDER, LYOPHILIZED, FOR SOLUTION INTRAMUSCULAR; INTRAVENOUS
Status: CANCELLED | OUTPATIENT
Start: 2019-12-05

## 2019-11-01 RX ORDER — ALBUTEROL SULFATE 0.83 MG/ML
2.5 SOLUTION RESPIRATORY (INHALATION)
Status: CANCELLED | OUTPATIENT
Start: 2019-11-07

## 2019-11-01 RX ORDER — CYTARABINE 100 MG/ML
75 INJECTION, SOLUTION INTRATHECAL; INTRAVENOUS; SUBCUTANEOUS ONCE
Status: CANCELLED
Start: 2019-11-07

## 2019-11-01 RX ORDER — ONDANSETRON 2 MG/ML
8 INJECTION INTRAMUSCULAR; INTRAVENOUS ONCE
Status: CANCELLED
Start: 2019-11-28

## 2019-11-01 RX ORDER — DIPHENHYDRAMINE HYDROCHLORIDE 50 MG/ML
50 INJECTION INTRAMUSCULAR; INTRAVENOUS
Status: CANCELLED
Start: 2019-11-28

## 2019-11-01 RX ORDER — DIPHENHYDRAMINE HYDROCHLORIDE 50 MG/ML
25-50 INJECTION INTRAMUSCULAR; INTRAVENOUS EVERY 6 HOURS PRN
Status: CANCELLED
Start: 2019-11-07

## 2019-11-01 RX ORDER — METHYLPREDNISOLONE SODIUM SUCCINATE 125 MG/2ML
125 INJECTION, POWDER, LYOPHILIZED, FOR SOLUTION INTRAMUSCULAR; INTRAVENOUS
Status: CANCELLED | OUTPATIENT
Start: 2019-11-28

## 2019-11-01 RX ORDER — CYTARABINE 100 MG/ML
75 INJECTION, SOLUTION INTRATHECAL; INTRAVENOUS; SUBCUTANEOUS ONCE
Status: CANCELLED
Start: 2019-11-28

## 2019-11-01 RX ORDER — ALBUTEROL SULFATE 90 UG/1
1-2 AEROSOL, METERED RESPIRATORY (INHALATION)
Status: CANCELLED
Start: 2019-12-12

## 2019-11-01 RX ORDER — SODIUM CHLORIDE 9 MG/ML
INJECTION, SOLUTION INTRAVENOUS CONTINUOUS
Status: CANCELLED
Start: 2019-11-07

## 2019-11-01 RX ORDER — ALBUTEROL SULFATE 0.83 MG/ML
2.5 SOLUTION RESPIRATORY (INHALATION)
Status: CANCELLED | OUTPATIENT
Start: 2019-12-12

## 2019-11-01 RX ORDER — DIPHENHYDRAMINE HYDROCHLORIDE 50 MG/ML
50 INJECTION INTRAMUSCULAR; INTRAVENOUS
Status: CANCELLED
Start: 2019-12-05

## 2019-11-01 RX ORDER — ALBUTEROL SULFATE 90 UG/1
1-2 AEROSOL, METERED RESPIRATORY (INHALATION)
Status: CANCELLED
Start: 2019-11-28

## 2019-11-01 RX ORDER — DIPHENHYDRAMINE HYDROCHLORIDE 50 MG/ML
50 INJECTION INTRAMUSCULAR; INTRAVENOUS
Status: CANCELLED
Start: 2019-11-07

## 2019-11-01 RX ORDER — EPINEPHRINE 1 MG/ML
0.3 INJECTION, SOLUTION, CONCENTRATE INTRAVENOUS EVERY 5 MIN PRN
Status: CANCELLED | OUTPATIENT
Start: 2019-12-12

## 2019-11-01 RX ORDER — ALBUTEROL SULFATE 0.83 MG/ML
2.5 SOLUTION RESPIRATORY (INHALATION)
Status: CANCELLED | OUTPATIENT
Start: 2019-11-28

## 2019-11-01 RX ORDER — ALBUTEROL SULFATE 90 UG/1
1-2 AEROSOL, METERED RESPIRATORY (INHALATION)
Status: CANCELLED
Start: 2019-12-05

## 2019-11-01 RX ORDER — SODIUM CHLORIDE 9 MG/ML
200 INJECTION, SOLUTION INTRAVENOUS CONTINUOUS PRN
Status: CANCELLED | OUTPATIENT
Start: 2019-11-28

## 2019-11-01 RX ORDER — ONDANSETRON 2 MG/ML
8 INJECTION INTRAMUSCULAR; INTRAVENOUS ONCE
Status: CANCELLED
Start: 2019-12-05

## 2019-11-01 RX ORDER — METHYLPREDNISOLONE SODIUM SUCCINATE 125 MG/2ML
125 INJECTION, POWDER, LYOPHILIZED, FOR SOLUTION INTRAMUSCULAR; INTRAVENOUS
Status: CANCELLED | OUTPATIENT
Start: 2019-11-07

## 2019-11-01 RX ORDER — DIPHENHYDRAMINE HYDROCHLORIDE 50 MG/ML
50 INJECTION INTRAMUSCULAR; INTRAVENOUS
Status: CANCELLED
Start: 2019-12-12

## 2019-11-01 RX ORDER — EPINEPHRINE 1 MG/ML
0.3 INJECTION, SOLUTION, CONCENTRATE INTRAVENOUS EVERY 5 MIN PRN
Status: CANCELLED | OUTPATIENT
Start: 2019-11-07

## 2019-11-01 RX ORDER — ALBUTEROL SULFATE 90 UG/1
1-2 AEROSOL, METERED RESPIRATORY (INHALATION)
Status: CANCELLED
Start: 2019-11-07

## 2019-11-01 RX ORDER — ONDANSETRON 2 MG/ML
8 INJECTION INTRAMUSCULAR; INTRAVENOUS ONCE
Status: CANCELLED
Start: 2019-11-07

## 2019-11-01 RX ORDER — ALBUTEROL SULFATE 0.83 MG/ML
2.5 SOLUTION RESPIRATORY (INHALATION)
Status: CANCELLED | OUTPATIENT
Start: 2019-12-05

## 2019-11-01 RX ORDER — EPINEPHRINE 1 MG/ML
0.3 INJECTION, SOLUTION, CONCENTRATE INTRAVENOUS EVERY 5 MIN PRN
Status: CANCELLED | OUTPATIENT
Start: 2019-12-05

## 2019-11-01 RX ORDER — SODIUM CHLORIDE 9 MG/ML
200 INJECTION, SOLUTION INTRAVENOUS CONTINUOUS PRN
Status: CANCELLED | OUTPATIENT
Start: 2019-12-12

## 2019-11-01 RX ORDER — SODIUM CHLORIDE 9 MG/ML
200 INJECTION, SOLUTION INTRAVENOUS CONTINUOUS PRN
Status: CANCELLED | OUTPATIENT
Start: 2019-12-05

## 2019-11-07 ENCOUNTER — HOSPITAL ENCOUNTER (OUTPATIENT)
Dept: ULTRASOUND IMAGING | Facility: CLINIC | Age: 19
End: 2019-11-07
Attending: NURSE PRACTITIONER | Admitting: RADIOLOGY
Payer: MEDICAID

## 2019-11-07 ENCOUNTER — HOSPITAL ENCOUNTER (INPATIENT)
Facility: CLINIC | Age: 19
LOS: 10 days | Discharge: HOME HEALTH | End: 2019-11-17
Attending: PEDIATRICS | Admitting: PEDIATRICS
Payer: MEDICAID

## 2019-11-07 ENCOUNTER — OFFICE VISIT (OUTPATIENT)
Dept: PEDIATRIC HEMATOLOGY/ONCOLOGY | Facility: CLINIC | Age: 19
End: 2019-11-07
Attending: NURSE PRACTITIONER
Payer: MEDICAID

## 2019-11-07 ENCOUNTER — INFUSION THERAPY VISIT (OUTPATIENT)
Dept: INFUSION THERAPY | Facility: CLINIC | Age: 19
End: 2019-11-07
Attending: PEDIATRICS
Payer: MEDICAID

## 2019-11-07 ENCOUNTER — HOSPITAL ENCOUNTER (OUTPATIENT)
Facility: CLINIC | Age: 19
End: 2019-11-07
Attending: PEDIATRICS
Payer: MEDICAID

## 2019-11-07 VITALS
BODY MASS INDEX: 21.62 KG/M2 | TEMPERATURE: 98.4 F | HEART RATE: 67 BPM | DIASTOLIC BLOOD PRESSURE: 58 MMHG | SYSTOLIC BLOOD PRESSURE: 127 MMHG | RESPIRATION RATE: 24 BRPM | WEIGHT: 159.61 LBS | HEIGHT: 72 IN | OXYGEN SATURATION: 99 %

## 2019-11-07 DIAGNOSIS — C83.50 T LYMPHOBLASTIC LYMPHOMA (H): ICD-10-CM

## 2019-11-07 DIAGNOSIS — K85.31 DRUG-INDUCED ACUTE PANCREATITIS WITH UNINFECTED NECROSIS: ICD-10-CM

## 2019-11-07 DIAGNOSIS — C95.00 ACUTE LEUKEMIA NOT HAVING ACHIEVED REMISSION (H): ICD-10-CM

## 2019-11-07 DIAGNOSIS — I82.623 ACUTE DEEP VEIN THROMBOSIS (DVT) OF OTHER VEIN OF BOTH UPPER EXTREMITIES (H): ICD-10-CM

## 2019-11-07 DIAGNOSIS — C83.50 T LYMPHOBLASTIC LYMPHOMA (H): Primary | ICD-10-CM

## 2019-11-07 DIAGNOSIS — E86.0 DEHYDRATION: Primary | ICD-10-CM

## 2019-11-07 LAB
ALBUMIN SERPL-MCNC: 3.3 G/DL (ref 3.4–5)
ALP SERPL-CCNC: 85 U/L (ref 40–150)
ALT SERPL W P-5'-P-CCNC: 54 U/L (ref 0–70)
AMYLASE SERPL-CCNC: 53 U/L (ref 30–110)
ANION GAP SERPL CALCULATED.3IONS-SCNC: 8 MMOL/L (ref 3–14)
ANISOCYTOSIS BLD QL SMEAR: ABNORMAL
AST SERPL W P-5'-P-CCNC: 27 U/L (ref 0–45)
BASOPHILS # BLD AUTO: 0 10E9/L (ref 0–0.2)
BASOPHILS NFR BLD AUTO: 0 %
BILIRUB SERPL-MCNC: 0.5 MG/DL (ref 0.2–1.3)
BUN SERPL-MCNC: 17 MG/DL (ref 7–30)
CALCIUM SERPL-MCNC: 8.1 MG/DL (ref 8.5–10.1)
CHLORIDE SERPL-SCNC: 107 MMOL/L (ref 94–109)
CO2 SERPL-SCNC: 25 MMOL/L (ref 20–32)
CREAT SERPL-MCNC: 0.69 MG/DL (ref 0.66–1.25)
DIFFERENTIAL METHOD BLD: ABNORMAL
EOSINOPHIL # BLD AUTO: 0 10E9/L (ref 0–0.7)
EOSINOPHIL NFR BLD AUTO: 0 %
ERYTHROCYTE [DISTWIDTH] IN BLOOD BY AUTOMATED COUNT: 14.7 % (ref 10–15)
GFR SERPL CREATININE-BSD FRML MDRD: >90 ML/MIN/{1.73_M2}
GLUCOSE SERPL-MCNC: 114 MG/DL (ref 70–99)
HCT VFR BLD AUTO: 26.7 % (ref 40–53)
HGB BLD-MCNC: 9.4 G/DL (ref 13.3–17.7)
LIPASE SERPL-CCNC: 372 U/L (ref 73–393)
LYMPHOCYTES # BLD AUTO: 0.9 10E9/L (ref 0.8–5.3)
LYMPHOCYTES NFR BLD AUTO: 28.7 %
MCH RBC QN AUTO: 27.5 PG (ref 26.5–33)
MCHC RBC AUTO-ENTMCNC: 35.2 G/DL (ref 31.5–36.5)
MCV RBC AUTO: 78 FL (ref 78–100)
MICROCYTES BLD QL SMEAR: PRESENT
MONOCYTES # BLD AUTO: 0.1 10E9/L (ref 0–1.3)
MONOCYTES NFR BLD AUTO: 3.5 %
NEUTROPHILS # BLD AUTO: 2.2 10E9/L (ref 1.6–8.3)
NEUTROPHILS NFR BLD AUTO: 67.8 %
NRBC # BLD AUTO: 0.1 10*3/UL
NRBC BLD AUTO-RTO: 3 /100
PLATELET # BLD AUTO: 399 10E9/L (ref 150–450)
PLATELET # BLD EST: ABNORMAL 10*3/UL
POIKILOCYTOSIS BLD QL SMEAR: SLIGHT
POTASSIUM SERPL-SCNC: 3.6 MMOL/L (ref 3.4–5.3)
PROT SERPL-MCNC: 5.4 G/DL (ref 6.8–8.8)
RBC # BLD AUTO: 3.42 10E12/L (ref 4.4–5.9)
RBC INCLUSIONS BLD: SLIGHT
SODIUM SERPL-SCNC: 140 MMOL/L (ref 133–144)
WBC # BLD AUTO: 3.2 10E9/L (ref 4–11)

## 2019-11-07 PROCEDURE — C9113 INJ PANTOPRAZOLE SODIUM, VIA: HCPCS | Mod: ZF | Performed by: NURSE PRACTITIONER

## 2019-11-07 PROCEDURE — 76700 US EXAM ABDOM COMPLETE: CPT

## 2019-11-07 PROCEDURE — 83690 ASSAY OF LIPASE: CPT | Performed by: PEDIATRICS

## 2019-11-07 PROCEDURE — 12000014 ZZH R&B PEDS UMMC

## 2019-11-07 PROCEDURE — 25800030 ZZH RX IP 258 OP 636: Mod: ZF

## 2019-11-07 PROCEDURE — 85025 COMPLETE CBC W/AUTO DIFF WBC: CPT | Performed by: PEDIATRICS

## 2019-11-07 PROCEDURE — 96375 TX/PRO/DX INJ NEW DRUG ADDON: CPT

## 2019-11-07 PROCEDURE — 25000132 ZZH RX MED GY IP 250 OP 250 PS 637: Performed by: STUDENT IN AN ORGANIZED HEALTH CARE EDUCATION/TRAINING PROGRAM

## 2019-11-07 PROCEDURE — 80053 COMPREHEN METABOLIC PANEL: CPT | Performed by: PEDIATRICS

## 2019-11-07 PROCEDURE — 25000128 H RX IP 250 OP 636: Mod: ZF | Performed by: NURSE PRACTITIONER

## 2019-11-07 PROCEDURE — 96361 HYDRATE IV INFUSION ADD-ON: CPT

## 2019-11-07 PROCEDURE — 96374 THER/PROPH/DIAG INJ IV PUSH: CPT

## 2019-11-07 PROCEDURE — 25800030 ZZH RX IP 258 OP 636: Mod: ZF | Performed by: NURSE PRACTITIONER

## 2019-11-07 PROCEDURE — 25000128 H RX IP 250 OP 636: Mod: ZF | Performed by: PEDIATRICS

## 2019-11-07 PROCEDURE — 25800030 ZZH RX IP 258 OP 636: Performed by: STUDENT IN AN ORGANIZED HEALTH CARE EDUCATION/TRAINING PROGRAM

## 2019-11-07 PROCEDURE — 82150 ASSAY OF AMYLASE: CPT | Performed by: PEDIATRICS

## 2019-11-07 PROCEDURE — 25000128 H RX IP 250 OP 636: Performed by: STUDENT IN AN ORGANIZED HEALTH CARE EDUCATION/TRAINING PROGRAM

## 2019-11-07 PROCEDURE — 25000128 H RX IP 250 OP 636: Mod: ZF

## 2019-11-07 PROCEDURE — 96376 TX/PRO/DX INJ SAME DRUG ADON: CPT

## 2019-11-07 RX ORDER — DIPHENHYDRAMINE HYDROCHLORIDE 50 MG/ML
INJECTION INTRAMUSCULAR; INTRAVENOUS
Status: DISCONTINUED
Start: 2019-11-07 | End: 2019-11-07 | Stop reason: HOSPADM

## 2019-11-07 RX ORDER — DIPHENHYDRAMINE HYDROCHLORIDE 50 MG/ML
50 INJECTION INTRAMUSCULAR; INTRAVENOUS
Status: CANCELLED
Start: 2019-11-21

## 2019-11-07 RX ORDER — DIPHENHYDRAMINE HCL 25 MG
25-50 CAPSULE ORAL EVERY 6 HOURS PRN
Status: DISCONTINUED | OUTPATIENT
Start: 2019-11-07 | End: 2019-11-07

## 2019-11-07 RX ORDER — SODIUM CHLORIDE 9 MG/ML
INJECTION, SOLUTION INTRAVENOUS
Status: DISCONTINUED
Start: 2019-11-07 | End: 2019-11-07 | Stop reason: HOSPADM

## 2019-11-07 RX ORDER — ONDANSETRON 4 MG/1
8 TABLET, FILM COATED ORAL EVERY 6 HOURS PRN
Status: DISCONTINUED | OUTPATIENT
Start: 2019-11-07 | End: 2019-11-07

## 2019-11-07 RX ORDER — DIPHENHYDRAMINE HYDROCHLORIDE 50 MG/ML
25-50 INJECTION INTRAMUSCULAR; INTRAVENOUS EVERY 6 HOURS PRN
Status: DISCONTINUED | OUTPATIENT
Start: 2019-11-07 | End: 2019-11-17 | Stop reason: HOSPADM

## 2019-11-07 RX ORDER — PANTOPRAZOLE SODIUM 40 MG/1
40 TABLET, DELAYED RELEASE ORAL DAILY
Qty: 30 TABLET | Refills: 0 | Status: ON HOLD | OUTPATIENT
Start: 2019-11-07 | End: 2019-11-15

## 2019-11-07 RX ORDER — HEPARIN SODIUM (PORCINE) LOCK FLUSH IV SOLN 100 UNIT/ML 100 UNIT/ML
SOLUTION INTRAVENOUS
Status: DISCONTINUED
Start: 2019-11-07 | End: 2019-11-07 | Stop reason: HOSPADM

## 2019-11-07 RX ORDER — SENNOSIDES 8.6 MG
2 TABLET ORAL 2 TIMES DAILY PRN
Status: DISCONTINUED | OUTPATIENT
Start: 2019-11-07 | End: 2019-11-17 | Stop reason: HOSPADM

## 2019-11-07 RX ORDER — EPINEPHRINE 1 MG/ML
0.3 INJECTION, SOLUTION, CONCENTRATE INTRAVENOUS EVERY 5 MIN PRN
Status: CANCELLED | OUTPATIENT
Start: 2019-11-21

## 2019-11-07 RX ORDER — POLYETHYLENE GLYCOL 3350 17 G/17G
17 POWDER, FOR SOLUTION ORAL DAILY PRN
Status: DISCONTINUED | OUTPATIENT
Start: 2019-11-07 | End: 2019-11-12

## 2019-11-07 RX ORDER — ONDANSETRON 2 MG/ML
8 INJECTION INTRAMUSCULAR; INTRAVENOUS ONCE
Status: COMPLETED | OUTPATIENT
Start: 2019-11-07 | End: 2019-11-07

## 2019-11-07 RX ORDER — SODIUM CHLORIDE 9 MG/ML
200 INJECTION, SOLUTION INTRAVENOUS CONTINUOUS PRN
Status: CANCELLED | OUTPATIENT
Start: 2019-11-21

## 2019-11-07 RX ORDER — LIDOCAINE 40 MG/G
CREAM TOPICAL
Status: CANCELLED | OUTPATIENT
Start: 2019-11-07

## 2019-11-07 RX ORDER — ALBUTEROL SULFATE 90 UG/1
1-2 AEROSOL, METERED RESPIRATORY (INHALATION)
Status: CANCELLED
Start: 2019-11-21

## 2019-11-07 RX ORDER — DIPHENHYDRAMINE HYDROCHLORIDE 50 MG/ML
25-50 INJECTION INTRAMUSCULAR; INTRAVENOUS EVERY 6 HOURS PRN
Status: DISCONTINUED | OUTPATIENT
Start: 2019-11-07 | End: 2019-11-07 | Stop reason: HOSPADM

## 2019-11-07 RX ORDER — ONDANSETRON 2 MG/ML
INJECTION INTRAMUSCULAR; INTRAVENOUS
Status: COMPLETED
Start: 2019-11-07 | End: 2019-11-07

## 2019-11-07 RX ORDER — DIPHENHYDRAMINE HCL 25 MG
25-50 CAPSULE ORAL EVERY 6 HOURS PRN
Status: DISCONTINUED | OUTPATIENT
Start: 2019-11-07 | End: 2019-11-17 | Stop reason: HOSPADM

## 2019-11-07 RX ORDER — LORAZEPAM 2 MG/ML
2 INJECTION INTRAMUSCULAR EVERY 6 HOURS PRN
Status: DISCONTINUED | OUTPATIENT
Start: 2019-11-07 | End: 2019-11-08

## 2019-11-07 RX ORDER — VITAMIN B COMPLEX
2000 TABLET ORAL DAILY
Status: DISCONTINUED | OUTPATIENT
Start: 2019-11-08 | End: 2019-11-17 | Stop reason: HOSPADM

## 2019-11-07 RX ORDER — ONDANSETRON 2 MG/ML
8 INJECTION INTRAMUSCULAR; INTRAVENOUS ONCE
Status: CANCELLED | OUTPATIENT
Start: 2019-11-21

## 2019-11-07 RX ORDER — HEPARIN SODIUM (PORCINE) LOCK FLUSH IV SOLN 100 UNIT/ML 100 UNIT/ML
5 SOLUTION INTRAVENOUS
Status: DISCONTINUED | OUTPATIENT
Start: 2019-11-07 | End: 2019-11-07 | Stop reason: HOSPADM

## 2019-11-07 RX ORDER — CYTARABINE 100 MG/ML
75 INJECTION, SOLUTION INTRATHECAL; INTRAVENOUS; SUBCUTANEOUS ONCE
Status: CANCELLED
Start: 2019-11-21

## 2019-11-07 RX ORDER — ONDANSETRON 2 MG/ML
8 INJECTION INTRAMUSCULAR; INTRAVENOUS EVERY 6 HOURS PRN
Status: DISCONTINUED | OUTPATIENT
Start: 2019-11-07 | End: 2019-11-08

## 2019-11-07 RX ORDER — DIPHENHYDRAMINE HYDROCHLORIDE 50 MG/ML
25-50 INJECTION INTRAMUSCULAR; INTRAVENOUS EVERY 6 HOURS PRN
Status: CANCELLED | OUTPATIENT
Start: 2019-11-21

## 2019-11-07 RX ORDER — SODIUM CHLORIDE 9 MG/ML
INJECTION, SOLUTION INTRAVENOUS CONTINUOUS
Status: CANCELLED
Start: 2019-11-21

## 2019-11-07 RX ORDER — SODIUM CHLORIDE 9 MG/ML
INJECTION, SOLUTION INTRAVENOUS CONTINUOUS
Status: CANCELLED | OUTPATIENT
Start: 2019-11-21

## 2019-11-07 RX ORDER — HEPARIN SODIUM,PORCINE 10 UNIT/ML
5 VIAL (ML) INTRAVENOUS ONCE
Status: CANCELLED | OUTPATIENT
Start: 2019-11-07 | End: 2019-11-07

## 2019-11-07 RX ORDER — ONDANSETRON 4 MG/1
8 TABLET, ORALLY DISINTEGRATING ORAL EVERY 6 HOURS PRN
Status: DISCONTINUED | OUTPATIENT
Start: 2019-11-07 | End: 2019-11-08

## 2019-11-07 RX ORDER — ALBUTEROL SULFATE 0.83 MG/ML
2.5 SOLUTION RESPIRATORY (INHALATION)
Status: CANCELLED | OUTPATIENT
Start: 2019-11-21

## 2019-11-07 RX ORDER — METHYLPREDNISOLONE SODIUM SUCCINATE 125 MG/2ML
125 INJECTION, POWDER, LYOPHILIZED, FOR SOLUTION INTRAMUSCULAR; INTRAVENOUS
Status: CANCELLED | OUTPATIENT
Start: 2019-11-21

## 2019-11-07 RX ORDER — SULFAMETHOXAZOLE/TRIMETHOPRIM 800-160 MG
1 TABLET ORAL
Status: DISCONTINUED | OUTPATIENT
Start: 2019-11-11 | End: 2019-11-17 | Stop reason: HOSPADM

## 2019-11-07 RX ORDER — ONDANSETRON 2 MG/ML
4 INJECTION INTRAMUSCULAR; INTRAVENOUS EVERY 4 HOURS PRN
Status: DISCONTINUED | OUTPATIENT
Start: 2019-11-07 | End: 2019-11-07

## 2019-11-07 RX ORDER — SCOLOPAMINE TRANSDERMAL SYSTEM 1 MG/1
1 PATCH, EXTENDED RELEASE TRANSDERMAL
Status: DISCONTINUED | OUTPATIENT
Start: 2019-11-07 | End: 2019-11-17 | Stop reason: HOSPADM

## 2019-11-07 RX ORDER — PANTOPRAZOLE SODIUM 40 MG/1
40 TABLET, DELAYED RELEASE ORAL DAILY
Status: DISCONTINUED | OUTPATIENT
Start: 2019-11-08 | End: 2019-11-07

## 2019-11-07 RX ORDER — SODIUM CHLORIDE 9 MG/ML
INJECTION, SOLUTION INTRAVENOUS CONTINUOUS
Status: ACTIVE | OUTPATIENT
Start: 2019-11-07 | End: 2019-11-07

## 2019-11-07 RX ORDER — ACETAMINOPHEN 325 MG/1
650 TABLET ORAL EVERY 4 HOURS PRN
Status: DISCONTINUED | OUTPATIENT
Start: 2019-11-07 | End: 2019-11-09

## 2019-11-07 RX ORDER — SODIUM CHLORIDE 9 MG/ML
INJECTION, SOLUTION INTRAVENOUS
Status: COMPLETED
Start: 2019-11-07 | End: 2019-11-07

## 2019-11-07 RX ADMIN — SODIUM CHLORIDE: 9 INJECTION, SOLUTION INTRAVENOUS at 07:57

## 2019-11-07 RX ADMIN — ONDANSETRON 8 MG: 2 INJECTION INTRAMUSCULAR; INTRAVENOUS at 13:56

## 2019-11-07 RX ADMIN — SODIUM CHLORIDE 1000 ML: 9 INJECTION, SOLUTION INTRAVENOUS at 10:39

## 2019-11-07 RX ADMIN — DEXTROSE AND SODIUM CHLORIDE: 5; 900 INJECTION, SOLUTION INTRAVENOUS at 16:15

## 2019-11-07 RX ADMIN — PANTOPRAZOLE SODIUM 40 MG: 40 INJECTION, POWDER, FOR SOLUTION INTRAVENOUS at 10:59

## 2019-11-07 RX ADMIN — ONDANSETRON 8 MG: 2 INJECTION INTRAMUSCULAR; INTRAVENOUS at 08:07

## 2019-11-07 RX ADMIN — DIPHENHYDRAMINE HYDROCHLORIDE 50 MG: 50 INJECTION, SOLUTION INTRAMUSCULAR; INTRAVENOUS at 12:15

## 2019-11-07 RX ADMIN — MELATONIN TAB 3 MG 3 MG: 3 TAB at 22:06

## 2019-11-07 RX ADMIN — ENOXAPARIN SODIUM 60 MG: 60 INJECTION SUBCUTANEOUS at 19:54

## 2019-11-07 ASSESSMENT — ACTIVITIES OF DAILY LIVING (ADL)
DRESS: 0-->INDEPENDENT
TOILETING: 0-->INDEPENDENT
AMBULATION: 0-->INDEPENDENT
TRANSFERRING: 0-->INDEPENDENT
RETIRED_COMMUNICATION: 0-->UNDERSTANDS/COMMUNICATES WITHOUT DIFFICULTY
FALL_HISTORY_WITHIN_LAST_SIX_MONTHS: NO
BATHING: 0-->INDEPENDENT
SWALLOWING: 0-->SWALLOWS FOODS/LIQUIDS WITHOUT DIFFICULTY
COGNITION: 0 - NO COGNITION ISSUES REPORTED
RETIRED_EATING: 0-->INDEPENDENT

## 2019-11-07 ASSESSMENT — MIFFLIN-ST. JEOR
SCORE: 1769.62
SCORE: 1772.51

## 2019-11-07 NOTE — LETTER
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 Lazaro presented with acute onset cough and was found to have an anterior mediastinal mass and malignant left-sided pleural effusion.  A CT guided biopsy was obtained at 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  He was admitted to [facility] [place] oncology service 8/30 and started on treatment per [id number].  He had a pleural effusion that required a chest tube and a pericardial effusion that was not drained. His Induction was complicated by cardiac compression secondary to his mass leading to tamponade, this improved through out his hospital stay.  He also had dysphagia that improved with treatment of his mass, swallow study was normal. His course was recently complicated by extensive bilateral UE DVTs for which anticoagulation was started during hospitalization last week. His Day 29 LP was cancelled due to folliculitis overlying his lumbar spine.  Lazaro comes to clinic today for Day 29 of Consolidation.    HPI:   Since Lazaro's last visit he has not been doing well.  He reports nausea and vomiting that has been unrelenting.  He doesn't think he was able to keep anything down yesterday.  He is also having abdominal discomfort that he localized to the LUQ.  He continues to have back discomfort as well that doesn't seem to be his typical post LP discomfort.  It is more generalized and includes the mid back.  He has not had fever or chills.  He is passing stool without difficulty, no diarrhea.  No skin concerns. His energy level is very low and he is spending most of his time on the couch or in bed. Denies any motor or sensory changes in his hands/feet.      Review of systems:  Remainder of ROS is complete and negative.    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  He was working full time at [employer] in [place] prior to his diagnosis.    Current Medications:  Current Outpatient Medications on File Prior to Visit   Medication Sig Dispense Refill     diphenhydrAMINE (BENADRYL) 25 MG capsule Take 1-2 capsules (25-50 mg) by mouth every 6 hours as needed (Breakthrough Nausea and Vomiting ) 30 capsule 1     enoxaparin (LOVENOX) 60 MG/0.6ML syringe Inject 0.6 mLs (60 mg) Subcutaneous every 12 hours 18 mL 1     melatonin 3 MG tablet Take 1 tablet (3 mg) by mouth At Bedtime 30 tablet 1     ondansetron (ZOFRAN) 4 MG tablet Take 2 tablets (8 mg) by mouth every 6 hours as needed (nausea / vomiting) 30 tablet 1     ondansetron (ZOFRAN) 8 MG tablet Take 1 tablet (8 mg) by mouth every 6 hours as needed for nausea 30 tablet 1     [] polyethylene glycol (MIRALAX/GLYCOLAX) packet Take 17 g by mouth daily (Patient taking differently: Take 17 g by mouth daily as needed ) 30 packet 0     scopolamine (TRANSDERM) 1 MG/3DAYS 72 hr patch Place 1 patch onto the skin every 72 hours 10 patch 3     [] sennosides (SENOKOT) 8.6 MG tablet Take 2 tablets by mouth 2 times daily as needed for constipation 60 each 1     sulfamethoxazole-trimethoprim (BACTRIM DS/SEPTRA DS) 800-160 MG tablet Take 1 tablet by mouth Every Mon, Tues two times daily 16 tablet  3     Vitamin D, Cholecalciferol, 1000 units TABS Take 2 tablets (2,000 Units) by mouth daily 60 tablet 3   Received influenza vaccine for the 5021-1813 season.  Currently taking lovenox and bactrim regularly.    Physical Exam:   Temp:  [98.4  F (36.9  C)] 98.4  F (36.9  C)  Pulse:  [57-60] 57  Resp:  [24] 24  BP: (136-142)/(51-62) 136/62  SpO2:  [100 %] 100 %  Wt Readings from Last 4 Encounters:   11/07/19 72.4 kg (159 lb 9.8 oz)   10/31/19 73.1 kg (161 lb 2.5 oz)   10/24/19 79.9 kg (176 lb 2.4 oz)   10/17/19 76.9 kg (169 lb 8.5 oz)   Weight at diagnosis was 75.2kg, Lazaro considers his baseline weight to be 173-175lbs before he initially got sick  General: Lazaro is alert, interactive and appropriate. He appears tired and is heaving when I first walk into the room.  HEENT: Skull is atrauamatic and normocephalic.  The fullness in the right side of his face has improved, no venous distention.  Full head of hair. PERRLA, sclera are non icteric and not injected, EOM are intact. Nares are patent without drainage. Oropharynx clear, no plaques noted. Buccal mucosa and tongue clear. MMM. Tympanic membranes are opaque bilaterally with light reflex and landmarks present.  Voice sounds hoarse.  Lymph:  Neck is supple without lymphadenopathy.  There is no supraclavicular, axillary or inguinal lymphadenopathy palpated.  Cardiovascular:  HR is regular, S1, S2 no murmur.  Capillary refill is < 2 seconds. Right arm without edema or erythema.  No pitting edema.  Cap refill < 2 sec. Peripheral pulses 2+/=. He has mildly distended veins noted on the left upper chest, not extending up to the neck, overlying the pectoralis.   Respiratory: No cough noted. Respirations are easy.  Lungs are clear to auscultation through out.  No crackles or wheezes.  Gastrointestinal:  BS present in all quadrants.  Abdomen is soft and flat, tenderness localized to the mid abdomen and LUQ.  No hepatosplenomegaly or masses are palpated.  Skin: Truncal and  upper extremity papular rash has improved.  No vesicular lesions.   Neurological:  CN 2-12 grossly intact. Gait is normal.  No issues with balance. Sensation intact in hands and feet.     Musculoskeletal:  Good strength and ROM in all extremities.  Strong dorsiflexion at ankles and great toes (5/5) bilaterally without any pain at the Achilles.    Labs:   Results for orders placed or performed in visit on 11/07/19   CBC with platelets differential     Status: Abnormal   Result Value Ref Range    WBC 3.2 (L) 4.0 - 11.0 10e9/L    RBC Count 3.42 (L) 4.4 - 5.9 10e12/L    Hemoglobin 9.4 (L) 13.3 - 17.7 g/dL    Hematocrit 26.7 (L) 40.0 - 53.0 %    MCV 78 78 - 100 fl    MCH 27.5 26.5 - 33.0 pg    MCHC 35.2 31.5 - 36.5 g/dL    RDW 14.7 10.0 - 15.0 %    Platelet Count 399 150 - 450 10e9/L    Diff Method Manual Differential     % Neutrophils 67.8 %    % Lymphocytes 28.7 %    % Monocytes 3.5 %    % Eosinophils 0.0 %    % Basophils 0.0 %    Nucleated RBCs 3 (H) 0 /100    Absolute Neutrophil 2.2 1.6 - 8.3 10e9/L    Absolute Lymphocytes 0.9 0.8 - 5.3 10e9/L    Absolute Monocytes 0.1 0.0 - 1.3 10e9/L    Absolute Eosinophils 0.0 0.0 - 0.7 10e9/L    Absolute Basophils 0.0 0.0 - 0.2 10e9/L    Absolute Nucleated RBC 0.1     Anisocytosis Moderate     Poikilocytosis Slight     RBC Fragments Slight     Microcytes Present     Platelet Estimate Confirming automated cell count    Comprehensive metabolic panel     Status: Abnormal   Result Value Ref Range    Sodium 140 133 - 144 mmol/L    Potassium 3.6 3.4 - 5.3 mmol/L    Chloride 107 94 - 109 mmol/L    Carbon Dioxide 25 20 - 32 mmol/L    Anion Gap 8 3 - 14 mmol/L    Glucose 114 (H) 70 - 99 mg/dL    Urea Nitrogen 17 7 - 30 mg/dL    Creatinine 0.69 0.66 - 1.25 mg/dL    GFR Estimate >90 >60 mL/min/[1.73_m2]    GFR Estimate If Black >90 >60 mL/min/[1.73_m2]    Calcium 8.1 (L) 8.5 - 10.1 mg/dL    Bilirubin Total 0.5 0.2 - 1.3 mg/dL    Albumin 3.3 (L) 3.4 - 5.0 g/dL    Protein Total 5.4 (L) 6.8 -  8.8 g/dL    Alkaline Phosphatase 85 40 - 150 U/L    ALT 54 0 - 70 U/L    AST 27 0 - 45 U/L   Amylase     Status: None   Result Value Ref Range    Amylase 53 30 - 110 U/L   Lipase     Status: None   Result Value Ref Range    Lipase 372 73 - 393 U/L     Assessment:  Lazaro Lund is a 19 year old young man with newly diagnosed T cell lymphoblastic lymphoma (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  He is being treated per [facility] protocol [id number] and comes to clinic today for Day 29 of Consolidation therapy.  He's had a CRu, resolution of lymphadenopathy however a small pleural effusion persists.  His Day 29 Induction LP was delayed due to folliculitis.  His course has been complicated by extensive bilateral DVT, on lovenox for anticoagulation. 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 Recent Anti-Xa was therapeutic. He has significant nausea and vomiting and has not been able to keep anything down,he has lost 17lbs in the past 2 weeks.  LUQ pain concerning for possible pancreatitis. He is on Vit D for deficiency.  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     Plan:   1) Will delay D29 therapy as Lazaro is not well today.  Plan to give antiemetics and 1L NS bolus.  Pancreatic enzymes are normal, will get abdominal ultrasound to further evaluate.  2) Blood counts are showing signs of recovery, no transfusion needed.  3) Continue lovenox at current dose, recent level in goal range of 0.6-1.  Will recheck level once he is not having weekly LPs (ordered for D36). He is aware of the need to hold PM prior and AM of his LP. Will maintain platelet count > 30K while on anticoagulation.  U/S stable last week, minor venous distention not surprising given the extent of his known thrombus, no other symptoms to suggest new acute thrombus. Plan to repeat U/S in 1-2 months.  4) Continue Vit D 3 2000IU daily, recheck level in 2 months.  5) Day 29 LP deferred, will make up on Day 29 of Consolidation.   6)  Given significant spinal headache will plan for IV caffeine following LPs in the future.   7) Plan to repeat CT at the end of Consolidation, if pleural effusion persists at that time may need to consider tapping it to see if malignant fluid persists.  8) Plan to recheck TPMT with genotype once that testing becomes available again.      Addendum:  Abdominal U/S is negative.  Following 1L of NS Lazaro had not urinated.  He has not vomited since his arrival.  Reassessed, VS are stable but interestingly he remains borderline bradycardic despite his dehydration.  This has been his baseline and previous EKGs have showed sinus bradycardia.  His lungs remain clear and HRR.  Will proceed with an additional liter of NS over 2 hours and reassess.  Will also plan for IV PPI while in clinic and restart protonix orally at home tomorrow.     Addendum:  Following 2 liters of NS Lazaro urinated 350ml of concentrated appearing urine.  His abdominal pain had resolved.  He was able to drink 8oz of fluid and eat some applesauce and keep it down.  However he was feeling nauseated again about 4 hours after zofran.  He received benadryl IV with good relief.  Additional dose of IV zofran given before he left clinic.  Given the severity of his N/V and how dehydrated he was I would like him to RTC tomorrow for re-evaluation and possibly additional IVF.  Spoke with his mom over the phone as well and discussed reasons to call the on call or seek care in the interim.    Addendum: Lazaro was ready for discharge from clinic and he developed recurrent nausea.  Lazaro did not feel comfortable going home with how he was feeling.  Will admit for additional fluids and antiemetics.     Recent Results (from the past 24 hour(s))   US abdomen complete    Narrative    US ABDOMEN COMPLETE   11/7/2019 9:29 AM      HISTORY: T cell lymphoma, vomiting, LUQ and back pain; T lymphoblastic  lymphoma (H)    COMPARISON: None available    FINDINGS: The liver has a  normal echotexture with increased  echogenicity diffusely. No focal abnormality. Liver span is 17.2 cm.  Visualized portions of the pancreas are normal. The spleen is normal  in size at 10.1 cm. The gallbladder is normal. Sonographic Rubi's  sign is negative. There are no gallstones. Common duct measures 3 mm.  The aorta and IVC are normal. The right kidney measures 11.8 cm. The  left kidney measures 11.8 cm. There is no hydronephrosis. The urinary  bladder demonstrates low-level internal echoes.      Impression    IMPRESSION:  1. Nonspecific bladder debris.  2. Hepatic steatosis.  3. No cholelithiasis, choledocholithiasis or biliary obstruction.     I have personally reviewed the examination and initial interpretation  and I agree with the findings.    MD Félix HANSEN APRN CNP

## 2019-11-07 NOTE — H&P
[facility], [place]    History and Physical - Pediatric Hematology Oncology Service        Date of Admission: 11/07/19    Assessment & Plan   Lazaro Lund is a 19 year old male admitted on 11/07/19 for intractable nausea, abdominal pain, and severe dehydration requiring aggressive fluid resuscitation. 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 He was due to start Cycle 1 day 29 but this will be delayed due to his presenting symptoms.     Lazaro's presenting symptoms and recent history of treatment with PEGasparaginase were consistent with acute pancreatitis, but abdominal ultrasound and labs were not indicative of this diagnosis. There was no indication of gall stones or other acute abdominal pathology seen on US. He is stooling daily making constipation less likely. The location of his pain and frequent vomiting could certainly also be consistent with gastritis and his pain was improved with IV protonix in clinic..    Given his degree of dehydration and inability to tolerate adequate oral intake to remain hydrated, ongoing nausea, and his risk for asparaginase associated pancreatitis due to his recent chemotherapy treatment, Lazaro will require admission for IV hydration and close monitoring.    HEME/ONC  T cell lymphoblastic lymphoma - [facility] - treated per protocol [id number], D29 of consolidation [id number] Consolidation. Not neutropenic on admission   - Cycle 1 D29 delayed until 11/14    DVT of the bilateral upper extremities  -Continue home lovenox 60mg Q12    PCP ppx  -Bactrim QM/T    Nausea  -Zofran PRN  -Benadryl PRN  -Scopolamine patch in place  -Ativan PRN    At risk for chemotherapy induced cytopenias  - maintain hgb > 7  - maintain plt count > 30 d/t lovenox therapy    GI  Abdominal pain - most consistent with gastritis vs early pancreatitis  - Abdominal US and labs reassuring against pancreatitis or  cholelithiasis   - IV protonix 40mg daily  - CBC, CMP, amylase, lipase in the AM if persistent symptoms    FEN:  Dehydration due to poor oral intake  -s/p NS bolus x2 in clinic  -D5NS @100ml/hr  -Stict I/O  -Normal diet as tolerated  -PTA vitamin D    H/O constipation  -Miralax daily PRN  -Senokot daily PRN    NEURO:  Abdominal pain  -Tylenol PRN  -Oxy PRN  -PTA Melatonin     Diet: Normal diet  Fluids: D5 NS @100ml/hr  DVT Prophylaxis: Low Risk/Ambulatory with no additional VTE prophylaxis indicated given Enoxaparin (Lovenox) subcutaneous treatment  Lara Catheter: not present  Code Status: Full    Disposition Plan   Expected discharge: 2-3 day pending tolerating oral intake and pain managed  Entered: Marely Godinez MD 11/07/2019, 4:40 PM     The patient's care was discussed with the Dr. Reynaldo Godinez MD  PL3 - Pediatrics  [facility]  pager [phone]    Physician Attestation     I, Reynaldo Campuzano MD, saw this patient with the resident and agree with the resident s findings and plan of care as documented in the resident s note.       I personally reviewed vital signs, medications, labs and imaging (as applicable).     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 He presents for abdominal pain, vomiting and dehydration.     Lazaro was well until Tuesday when he began having baseline nausea with intermittent vomiting. He was able to manage it at home until last evening when he was unable to keep anything down including water. His  vomit is non-bloody non-bilious and looks like partially digested food. He has also had central upper abdominal pain, which does not radiate to his back. He was not previously on an acid blocker at home. He has been using his bowel regimen PRN and held his morning dose of lovenox this AM as instructed prior to his planned LP.     He denies fevers, constipation, diarrhea, headaches, URI symptoms, or cough. He has been feeling dizzy today when he stands up. He has no known sick contacts.    In clinic maia was noted to be tachycardic but not febrile. He received an NS bolus x2, IV protonix, 8mg of zofran and IV benadryl 50mg with improvement in his symptoms.    Review of Systems    The 10 point Review of Systems is negative other than noted in the HPI or here.     Past Medical History    I have reviewed this patient's medical history and updated it with pertinent information if needed.   Past Medical History:   Diagnosis Date     DVT of upper extremity (deep vein thrombosis) (H) 09/26/2019    Bilateral      Edema of upper extremity 10/17/2019     Folliculitis 10/17/2019     Migraine 2006    have resolved     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       Past Surgical History   I have reviewed this patient's surgical history and updated it with pertinent information if needed.  Past Surgical History:   Procedure Laterality Date     BONE MARROW BIOPSY, BONE SPECIMEN, NEEDLE/TROCAR Left 9/1/2019    Procedure: BIOPSY, BONE MARROW;  Surgeon: Heather Lopez MD;  Location: [facility] OR     INSERT PICC LINE N/A 8/31/2019    Procedure: INSERTION, PICC;  Surgeon: Michell Keith MD;  Location: [facility] OR     INSERT PORT VASCULAR ACCESS N/A 10/24/2019    Procedure: INSERTION, VASCULAR ACCESS PORT;  Surgeon: Silviano Martins MD;  Location: [facility] P[facility]      IR CHEST PORT PLACEMENT > 5 YRS OF AGE  10/24/2019     IR CHEST TUBE PLACEMENT NON-TUNNELLED LEFT  8/31/2019     IR PICC PLACEMENT > 5 YRS OF AGE  8/31/2019     SPINAL  PUNCTURE,LUMBAR, INTRATHECAL CHEMO DELIVERY N/A 8/31/2019    Procedure: LUMBAR PUNCTURE, WITH INTRATHECAL CHEMOTHERAPY ADMINISTRATION;  Surgeon: Heather Lopez MD;  [facility]: [facility] OR     SPINAL PUNCTURE,LUMBAR, INTRATHECAL CHEMO DELIVERY N/A 9/9/2019    Procedure: Lumbar Puncture With Intrathecal Chemo;  Surgeon: Alexi Hayes MD;  [facility]: [facility]     SPINAL PUNCTURE,LUMBAR, INTRATHECAL CHEMO DELIVERY N/A 10/10/2019    Procedure: Lumbar puncture with IT Chemo (CD);  Surgeon: Reynaldo Campuzano MD;  [facility]      SPINAL PUNCTURE,LUMBAR, INTRATHECAL CHEMO DELIVERY N/A 10/17/2019    Procedure: Lumbar puncture with IT Chemo (not CD);  Surgeon: Reynaldo Campuzano MD;  [facility]      SPINAL PUNCTURE,LUMBAR, INTRATHECAL CHEMO DELIVERY N/A 10/24/2019    Procedure: LUMBAR PUNCTURE, WITH INTRATHECAL CHEMOTHERAPY ADMINISTRATION;  Surgeon: Félix Van, APRN CNP;  [facility]      SPINAL PUNCTURE,LUMBAR, INTRATHECAL CHEMO DELIVERY N/A 10/31/2019    Procedure: Lumbar puncture with IT Chemo (not CD);  Surgeon: Reynaldo Campuzano MD;  [facility]      THORACENTESIS N/A 8/31/2019    Procedure: Thoracentesis;  Surgeon: Michell Keith MD;  Location: [facility] OR        Social History   I have reviewed this patient's social history and updated it with pertinent information if needed. Lazaro PLUNKETT [place]  reports that he has been smoking cigarettes. He has been smoking about 0.00 packs per day. He has never used smokeless tobacco. He reports that he does not drink alcohol or use drugs.    Family History   I have reviewed this patient's family history and updated it with pertinent information if needed.   Family History   Problem Relation Age of Onset     No Known Problems Mother      No Known Problems Father      Asthma Brother      Thyroid Cancer Paternal Grandmother      Melanoma Paternal Aunt        Prior to Admission Medications   Prior  to Admission Medications   Prescriptions Last Dose Informant Patient Reported? Taking?   Vitamin D, Cholecalciferol, 1000 units TABS   No No   Sig: Take 2 tablets (2,000 Units) by mouth daily   diphenhydrAMINE (BENADRYL) 25 MG capsule   No No   Sig: Take 1-2 capsules (25-50 mg) by mouth every 6 hours as needed (Breakthrough Nausea and Vomiting )   enoxaparin (LOVENOX) 60 MG/0.6ML syringe   No No   Sig: Inject 0.6 mLs (60 mg) Subcutaneous every 12 hours   melatonin 3 MG tablet   No No   Sig: Take 1 tablet (3 mg) by mouth At Bedtime   ondansetron (ZOFRAN) 4 MG tablet   No No   Sig: Take 2 tablets (8 mg) by mouth every 6 hours as needed (nausea / vomiting)   ondansetron (ZOFRAN) 8 MG tablet   No No   Sig: Take 1 tablet (8 mg) by mouth every 6 hours as needed for nausea   pantoprazole (PROTONIX) 40 MG EC tablet   No No   Sig: Take 1 tablet (40 mg) by mouth daily   polyethylene glycol (MIRALAX/GLYCOLAX) packet   No No   Sig: Take 17 g by mouth daily   Patient taking differently: Take 17 g by mouth daily as needed    scopolamine (TRANSDERM) 1 MG/3DAYS 72 hr patch   No No   Sig: Place 1 patch onto the skin every 72 hours   sennosides (SENOKOT) 8.6 MG tablet   No No   Sig: Take 2 tablets by mouth 2 times daily as needed for constipation   sulfamethoxazole-trimethoprim (BACTRIM DS/SEPTRA DS) 800-160 MG tablet   No No   Sig: Take 1 tablet by mouth Every Mon, Tues two times daily      Facility-Administered Medications Last Administration Doses Remaining   0.9% sodium chloride BOLUS None recorded 1        Allergies   Allergies   Allergen Reactions     No Known Drug Allergies        Physical Exam   Vital Signs: Temp: 98.8  F (37.1  C) Temp src: Oral BP: 122/84 Pulse: 56   Resp: 18 SpO2: 98 % O2 Device: None (Room air)    Weight: 162 lbs .61 oz (Baseline 175 lbs)    EXAM:  GENERAL: alert, awake, sitting up in bed, appears comfortable  SKIN: Clear. No significant rash, abnormal pigmentation or lesions  HEAD: Normocephalic  EYES:  Pupils equal, round, reactive, Extraocular muscles intact. Normal conjunctivae.  EARS: Normal canals. Tympanic membranes are normal; gray and translucent.  NOSE: Normal without discharge.  MOUTH/THROAT: Clear. No oral lesions. Teeth without obvious abnormalities.  NECK: Supple, no masses.  LYMPH NODES: No adenopathy  LUNGS: Clear. No rales, rhonchi, wheezing or retractions  HEART: Regular rhythm. Normal S1/S2. No murmurs. Normal pulses.  ABDOMEN: Soft, flat, non-distended. Bowel sounds normal. Tender to palpation in the epigastric region. No peritoneal signs. No RUQ or RLQ pain with palpation.  NEUROLOGIC: No focal findings. Cranial nerves grossly intact: DTR's normal. Normal gait, strength and tone  BACK: Spine is straight, no scoliosis.  EXTREMITIES: Full range of motion, no deformities    Data   Data reviewed today: I reviewed all medications, new labs and imaging results over the last 24 hours.    Recent Labs   Lab 11/07/19  0802   WBC 3.2*   HGB 9.4*   MCV 78         POTASSIUM 3.6   CHLORIDE 107   CO2 25   BUN 17   CR 0.69   ANIONGAP 8   MICHAEL 8.1*   *   ALBUMIN 3.3*   PROTTOTAL 5.4*   BILITOTAL 0.5   ALKPHOS 85   ALT 54   AST 27   LIPASE 372     Recent Results (from the past 24 hour(s))   US abdomen complete    Narrative    US ABDOMEN COMPLETE   11/7/2019 9:29 AM      HISTORY: T cell lymphoma, vomiting, LUQ and back pain; T lymphoblastic  lymphoma (H)    COMPARISON: None available    FINDINGS: The liver has a normal echotexture with increased  echogenicity diffusely. No focal abnormality. Liver span is 17.2 cm.  Visualized portions of the pancreas are normal. The spleen is normal  in size at 10.1 cm. The gallbladder is normal. Sonographic Rubi's  sign is negative. There are no gallstones. Common duct measures 3 mm.  The aorta and IVC are normal. The right kidney measures 11.8 cm. The  left kidney measures 11.8 cm. There is no hydronephrosis. The urinary  bladder demonstrates low-level  internal echoes.      Impression    IMPRESSION:  1. Nonspecific bladder debris.  2. Hepatic steatosis.  3. No cholelithiasis, choledocholithiasis or biliary obstruction.     I have personally reviewed the examination and initial interpretation  and I agree with the findings.    BHAVYA CASTRO MD

## 2019-11-07 NOTE — PROGRESS NOTES
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 Lazaro presented with acute onset cough and was found to have an anterior mediastinal mass and malignant left-sided pleural effusion.  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  He was admitted to [facility] oncology service 8/30 and started on treatment per [id number].  He had a pleural effusion that required a chest tube and a pericardial effusion that was not drained. His Induction was complicated by cardiac compression secondary to his mass leading to tamponade, this improved through out his hospital stay.  He also had dysphagia that improved with treatment of his mass, swallow study was normal. His course was recently complicated by extensive bilateral UE DVTs for which anticoagulation was started during hospitalization last week. His Day 29 LP was cancelled due to folliculitis overlying his lumbar spine.  Lazaro comes to clinic today for Day 29 of Consolidation.    HPI:   Since Lazaro's last visit he has not been doing well.  He reports nausea and vomiting that has been unrelenting.  He doesn't think he was able to keep anything down yesterday.  He is also having abdominal discomfort that he localized to the LUQ.  He continues to have back discomfort as well that doesn't seem to be his typical post LP discomfort.  It is more generalized and includes the mid back.  He has not had fever or chills.  He is passing stool without difficulty, no diarrhea.  No skin concerns. His energy level is very low and he is spending most of his time on the couch or in bed. Denies any motor or sensory changes in his hands/feet.      Review of systems:  Remainder of ROS is complete and negative.    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  He was working full time at [employer] in [place] prior to his diagnosis.    Current Medications:  Current Outpatient Medications on File Prior to Visit   Medication Sig Dispense Refill     diphenhydrAMINE (BENADRYL) 25 MG capsule Take 1-2 capsules (25-50 mg) by mouth every 6 hours as needed (Breakthrough Nausea and Vomiting ) 30 capsule 1     enoxaparin (LOVENOX) 60 MG/0.6ML syringe Inject 0.6 mLs (60 mg) Subcutaneous every 12 hours 18 mL 1     melatonin 3 MG tablet Take 1 tablet (3 mg) by mouth At Bedtime 30 tablet 1     ondansetron (ZOFRAN) 4 MG tablet Take 2 tablets (8 mg) by mouth every 6 hours as needed (nausea / vomiting) 30 tablet 1     ondansetron (ZOFRAN) 8 MG tablet Take 1 tablet (8 mg) by mouth every 6 hours as needed for nausea 30 tablet 1     [] polyethylene glycol (MIRALAX/GLYCOLAX) packet Take 17 g by mouth daily (Patient taking differently: Take 17 g by mouth daily as needed ) 30 packet 0     scopolamine (TRANSDERM) 1 MG/3DAYS 72 hr patch Place 1 patch onto the skin every 72 hours 10 patch 3     [] sennosides (SENOKOT) 8.6 MG tablet Take 2 tablets by mouth 2 times daily as needed for constipation 60 each 1     sulfamethoxazole-trimethoprim (BACTRIM DS/SEPTRA DS) 800-160 MG tablet Take 1 tablet by mouth Every Mon, Tues two times daily 16 tablet 3     Vitamin D, Cholecalciferol, 1000 units TABS Take 2 tablets (2,000 Units)  by mouth daily 60 tablet 3   Received influenza vaccine for the 8540-4914 season.  Currently taking lovenox and bactrim regularly.    Physical Exam:   Temp:  [98.4  F (36.9  C)] 98.4  F (36.9  C)  Pulse:  [57-60] 57  Resp:  [24] 24  BP: (136-142)/(51-62) 136/62  SpO2:  [100 %] 100 %  Wt Readings from Last 4 Encounters:   11/07/19 72.4 kg (159 lb 9.8 oz)   10/31/19 73.1 kg (161 lb 2.5 oz)   10/24/19 79.9 kg (176 lb 2.4 oz)   10/17/19 76.9 kg (169 lb 8.5 oz)   Weight at diagnosis was 75.2kg, Lazaro considers his baseline weight to be 173-175lbs before he initially got sick  General: Lazaro is alert, interactive and appropriate. He appears tired and is heaving when I first walk into the room.  HEENT: Skull is atrauamatic and normocephalic.  The fullness in the right side of his face has improved, no venous distention.  Full head of hair. PERRLA, sclera are non icteric and not injected, EOM are intact. Nares are patent without drainage. Oropharynx clear, no plaques noted. Buccal mucosa and tongue clear. MMM. Tympanic membranes are opaque bilaterally with light reflex and landmarks present.  Voice sounds hoarse.  Lymph:  Neck is supple without lymphadenopathy.  There is no supraclavicular, axillary or inguinal lymphadenopathy palpated.  Cardiovascular:  HR is regular, S1, S2 no murmur.  Capillary refill is < 2 seconds. Right arm without edema or erythema.  No pitting edema.  Cap refill < 2 sec. Peripheral pulses 2+/=. He has mildly distended veins noted on the left upper chest, not extending up to the neck, overlying the pectoralis.   Respiratory: No cough noted. Respirations are easy.  Lungs are clear to auscultation through out.  No crackles or wheezes.  Gastrointestinal:  BS present in all quadrants.  Abdomen is soft and flat, tenderness localized to the mid abdomen and LUQ.  No hepatosplenomegaly or masses are palpated.  Skin: Truncal and upper extremity papular rash has improved.  No vesicular lesions.    Neurological:  CN 2-12 grossly intact. Gait is normal.  No issues with balance. Sensation intact in hands and feet.     Musculoskeletal:  Good strength and ROM in all extremities.  Strong dorsiflexion at ankles and great toes (5/5) bilaterally without any pain at the Achilles.    Labs:   Results for orders placed or performed in visit on 11/07/19   CBC with platelets differential     Status: Abnormal   Result Value Ref Range    WBC 3.2 (L) 4.0 - 11.0 10e9/L    RBC Count 3.42 (L) 4.4 - 5.9 10e12/L    Hemoglobin 9.4 (L) 13.3 - 17.7 g/dL    Hematocrit 26.7 (L) 40.0 - 53.0 %    MCV 78 78 - 100 fl    MCH 27.5 26.5 - 33.0 pg    MCHC 35.2 31.5 - 36.5 g/dL    RDW 14.7 10.0 - 15.0 %    Platelet Count 399 150 - 450 10e9/L    Diff Method Manual Differential     % Neutrophils 67.8 %    % Lymphocytes 28.7 %    % Monocytes 3.5 %    % Eosinophils 0.0 %    % Basophils 0.0 %    Nucleated RBCs 3 (H) 0 /100    Absolute Neutrophil 2.2 1.6 - 8.3 10e9/L    Absolute Lymphocytes 0.9 0.8 - 5.3 10e9/L    Absolute Monocytes 0.1 0.0 - 1.3 10e9/L    Absolute Eosinophils 0.0 0.0 - 0.7 10e9/L    Absolute Basophils 0.0 0.0 - 0.2 10e9/L    Absolute Nucleated RBC 0.1     Anisocytosis Moderate     Poikilocytosis Slight     RBC Fragments Slight     Microcytes Present     Platelet Estimate Confirming automated cell count    Comprehensive metabolic panel     Status: Abnormal   Result Value Ref Range    Sodium 140 133 - 144 mmol/L    Potassium 3.6 3.4 - 5.3 mmol/L    Chloride 107 94 - 109 mmol/L    Carbon Dioxide 25 20 - 32 mmol/L    Anion Gap 8 3 - 14 mmol/L    Glucose 114 (H) 70 - 99 mg/dL    Urea Nitrogen 17 7 - 30 mg/dL    Creatinine 0.69 0.66 - 1.25 mg/dL    GFR Estimate >90 >60 mL/min/[1.73_m2]    GFR Estimate If Black >90 >60 mL/min/[1.73_m2]    Calcium 8.1 (L) 8.5 - 10.1 mg/dL    Bilirubin Total 0.5 0.2 - 1.3 mg/dL    Albumin 3.3 (L) 3.4 - 5.0 g/dL    Protein Total 5.4 (L) 6.8 - 8.8 g/dL    Alkaline Phosphatase 85 40 - 150 U/L    ALT 54 0 - 70  U/L    AST 27 0 - 45 U/L   Amylase     Status: None   Result Value Ref Range    Amylase 53 30 - 110 U/L   Lipase     Status: None   Result Value Ref Range    Lipase 372 73 - 393 U/L     Assessment:  Lazaro Lund is a 19 year old young man with newly diagnosed T cell lymphoblastic lymphoma (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  He is being treated per [facility] protocol [id number] and comes to clinic today for Day 29 of Consolidation therapy.  He's had a CRu, resolution of lymphadenopathy however a small pleural effusion persists.  His Day 29 Induction LP was delayed due to folliculitis.  His course has been complicated by extensive bilateral DVT, on lovenox for anticoagulation. 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 Recent Anti-Xa was therapeutic. He has significant nausea and vomiting and has not been able to keep anything down,he has lost 17lbs in the past 2 weeks.  LUQ pain concerning for possible pancreatitis. He is on Vit D for deficiency.  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     Plan:   1) Will delay D29 therapy as Lazaro is not well today.  Plan to give antiemetics and 1L NS bolus.  Pancreatic enzymes are normal, will get abdominal ultrasound to further evaluate.  2) Blood counts are showing signs of recovery, no transfusion needed.  3) Continue lovenox at current dose, recent level in goal range of 0.6-1.  Will recheck level once he is not having weekly LPs (ordered for D36). He is aware of the need to hold PM prior and AM of his LP. Will maintain platelet count > 30K while on anticoagulation.  U/S stable last week, minor venous distention not surprising given the extent of his known thrombus, no other symptoms to suggest new acute thrombus. Plan to repeat U/S in 1-2 months.  4) Continue Vit D 3 2000IU daily, recheck level in 2 months.  5) Day 29 LP deferred, will make up on Day 29 of Consolidation.   6) Given significant spinal headache will plan for IV caffeine following  LPs in the future.   7) Plan to repeat CT at the end of Consolidation, if pleural effusion persists at that time may need to consider tapping it to see if malignant fluid persists.  8) Plan to recheck TPMT with genotype once that testing becomes available again.      Addendum:  Abdominal U/S is negative.  Following 1L of NS Lazaro had not urinated.  He has not vomited since his arrival.  Reassessed, VS are stable but interestingly he remains borderline bradycardic despite his dehydration.  This has been his baseline and previous EKGs have showed sinus bradycardia.  His lungs remain clear and HRR.  Will proceed with an additional liter of NS over 2 hours and reassess.  Will also plan for IV PPI while in clinic and restart protonix orally at home tomorrow.     Addendum:  Following 2 liters of NS Lazaro urinated 350ml of concentrated appearing urine.  His abdominal pain had resolved.  He was able to drink 8oz of fluid and eat some applesauce and keep it down.  However he was feeling nauseated again about 4 hours after zofran.  He received benadryl IV with good relief.  Additional dose of IV zofran given before he left clinic.  Given the severity of his N/V and how dehydrated he was I would like him to RTC tomorrow for re-evaluation and possibly additional IVF.  Spoke with his mom over the phone as well and discussed reasons to call the on call or seek care in the interim.    Addendum: Lazaro was ready for discharge from clinic and he developed recurrent nausea.  Lazaro did not feel comfortable going home with how he was feeling.  Will admit for additional fluids and antiemetics.     Recent Results (from the past 24 hour(s))   US abdomen complete    Narrative    US ABDOMEN COMPLETE   11/7/2019 9:29 AM      HISTORY: T cell lymphoma, vomiting, LUQ and back pain; T lymphoblastic  lymphoma (H)    COMPARISON: None available    FINDINGS: The liver has a normal echotexture with increased  echogenicity diffusely. No focal  abnormality. Liver span is 17.2 cm.  Visualized portions of the pancreas are normal. The spleen is normal  in size at 10.1 cm. The gallbladder is normal. Sonographic Rubi's  sign is negative. There are no gallstones. Common duct measures 3 mm.  The aorta and IVC are normal. The right kidney measures 11.8 cm. The  left kidney measures 11.8 cm. There is no hydronephrosis. The urinary  bladder demonstrates low-level internal echoes.      Impression    IMPRESSION:  1. Nonspecific bladder debris.  2. Hepatic steatosis.  3. No cholelithiasis, choledocholithiasis or biliary obstruction.     I have personally reviewed the examination and initial interpretation  and I agree with the findings.    BHAVYA CASTRO MD

## 2019-11-07 NOTE — PROGRESS NOTES
Lazaro came to clinic today for C1 D29 Cyclophosphamide/Cytoxan. Cream on upon arrival to clinic. Port accessed using sterile technique. + blood return noted and labs drawn. Pt c/o nausea upon arrival to clinic, also c/o abd pain rating 4/10. Pre fluids started and IV Zofran given. 1 Liter bolus given over 2 hours. Not given chemo today related to symptoms per Luana Van NP.  Abdominal  ultrasound done today due to complaints of abd pain. IV benadryl given for continued nausea. 2nd liter NS bolus given over 2 hours as ordered. Re-dosed IV Zofran at 1400. IVF continued to infuse at [facility]KO. Pt transferred to unit 5, report given to Sirisha QUEVEDO RN. Pt Transferred to room 5143 at 1545.

## 2019-11-08 ENCOUNTER — APPOINTMENT (OUTPATIENT)
Dept: GENERAL RADIOLOGY | Facility: CLINIC | Age: 19
End: 2019-11-08
Attending: STUDENT IN AN ORGANIZED HEALTH CARE EDUCATION/TRAINING PROGRAM
Payer: MEDICAID

## 2019-11-08 LAB
ACANTHOCYTES BLD QL SMEAR: SLIGHT
ALBUMIN SERPL-MCNC: 3.2 G/DL (ref 3.4–5)
ALBUMIN UR-MCNC: NEGATIVE MG/DL
ALP SERPL-CCNC: 81 U/L (ref 40–150)
ALT SERPL W P-5'-P-CCNC: 61 U/L (ref 0–70)
AMYLASE SERPL-CCNC: 148 U/L (ref 30–110)
ANION GAP SERPL CALCULATED.3IONS-SCNC: 8 MMOL/L (ref 3–14)
ANISOCYTOSIS BLD QL SMEAR: SLIGHT
APPEARANCE UR: CLEAR
AST SERPL W P-5'-P-CCNC: 32 U/L (ref 0–45)
BASOPHILS # BLD AUTO: 0 10E9/L (ref 0–0.2)
BASOPHILS NFR BLD AUTO: 0 %
BILIRUB SERPL-MCNC: 0.6 MG/DL (ref 0.2–1.3)
BILIRUB UR QL STRIP: NEGATIVE
BUN SERPL-MCNC: 11 MG/DL (ref 7–30)
CALCIUM SERPL-MCNC: 7.8 MG/DL (ref 8.5–10.1)
CHLORIDE SERPL-SCNC: 109 MMOL/L (ref 94–109)
CHOLEST SERPL-MCNC: 81 MG/DL
CO2 SERPL-SCNC: 23 MMOL/L (ref 20–32)
COLOR UR AUTO: ABNORMAL
CREAT SERPL-MCNC: 0.75 MG/DL (ref 0.66–1.25)
DIFFERENTIAL METHOD BLD: ABNORMAL
EOSINOPHIL # BLD AUTO: 0 10E9/L (ref 0–0.7)
EOSINOPHIL NFR BLD AUTO: 0 %
ERYTHROCYTE [DISTWIDTH] IN BLOOD BY AUTOMATED COUNT: 16.6 % (ref 10–15)
GFR SERPL CREATININE-BSD FRML MDRD: >90 ML/MIN/{1.73_M2}
GLUCOSE SERPL-MCNC: 115 MG/DL (ref 70–99)
GLUCOSE UR STRIP-MCNC: NEGATIVE MG/DL
HCT VFR BLD AUTO: 27.3 % (ref 40–53)
HDLC SERPL-MCNC: 30 MG/DL
HGB BLD-MCNC: 9.4 G/DL (ref 13.3–17.7)
HGB UR QL STRIP: NEGATIVE
KETONES UR STRIP-MCNC: NEGATIVE MG/DL
LDLC SERPL CALC-MCNC: 37 MG/DL
LEUKOCYTE ESTERASE UR QL STRIP: NEGATIVE
LIPASE SERPL-CCNC: 2560 U/L (ref 73–393)
LYMPHOCYTES # BLD AUTO: 0.7 10E9/L (ref 0.8–5.3)
LYMPHOCYTES NFR BLD AUTO: 19.1 %
MCH RBC QN AUTO: 27.5 PG (ref 26.5–33)
MCHC RBC AUTO-ENTMCNC: 34.4 G/DL (ref 31.5–36.5)
MCV RBC AUTO: 80 FL (ref 78–100)
MICROCYTES BLD QL SMEAR: PRESENT
MONOCYTES # BLD AUTO: 0.5 10E9/L (ref 0–1.3)
MONOCYTES NFR BLD AUTO: 12.7 %
MUCOUS THREADS #/AREA URNS LPF: PRESENT /LPF
NEUTROPHILS # BLD AUTO: 2.5 10E9/L (ref 1.6–8.3)
NEUTROPHILS NFR BLD AUTO: 68.2 %
NITRATE UR QL: NEGATIVE
NONHDLC SERPL-MCNC: 51 MG/DL
NRBC # BLD AUTO: 0.1 10*3/UL
NRBC BLD AUTO-RTO: 4 /100
PH UR STRIP: 6.5 PH (ref 5–7)
PLATELET # BLD AUTO: 391 10E9/L (ref 150–450)
PLATELET # BLD EST: ABNORMAL 10*3/UL
POIKILOCYTOSIS BLD QL SMEAR: SLIGHT
POLYCHROMASIA BLD QL SMEAR: SLIGHT
POTASSIUM SERPL-SCNC: 3.2 MMOL/L (ref 3.4–5.3)
PROT SERPL-MCNC: 5.1 G/DL (ref 6.8–8.8)
RBC # BLD AUTO: 3.42 10E12/L (ref 4.4–5.9)
RBC #/AREA URNS AUTO: <1 /HPF (ref 0–2)
SODIUM SERPL-SCNC: 140 MMOL/L (ref 133–144)
SOURCE: ABNORMAL
SP GR UR STRIP: 1.01 (ref 1–1.03)
TRIGL SERPL-MCNC: 72 MG/DL
UROBILINOGEN UR STRIP-MCNC: NORMAL MG/DL (ref 0–2)
WBC # BLD AUTO: 3.6 10E9/L (ref 4–11)
WBC #/AREA URNS AUTO: 1 /HPF (ref 0–5)

## 2019-11-08 PROCEDURE — 82150 ASSAY OF AMYLASE: CPT | Performed by: STUDENT IN AN ORGANIZED HEALTH CARE EDUCATION/TRAINING PROGRAM

## 2019-11-08 PROCEDURE — 93005 ELECTROCARDIOGRAM TRACING: CPT

## 2019-11-08 PROCEDURE — 85025 COMPLETE CBC W/AUTO DIFF WBC: CPT | Performed by: STUDENT IN AN ORGANIZED HEALTH CARE EDUCATION/TRAINING PROGRAM

## 2019-11-08 PROCEDURE — 80053 COMPREHEN METABOLIC PANEL: CPT | Performed by: STUDENT IN AN ORGANIZED HEALTH CARE EDUCATION/TRAINING PROGRAM

## 2019-11-08 PROCEDURE — 25000132 ZZH RX MED GY IP 250 OP 250 PS 637: Performed by: STUDENT IN AN ORGANIZED HEALTH CARE EDUCATION/TRAINING PROGRAM

## 2019-11-08 PROCEDURE — 25800030 ZZH RX IP 258 OP 636: Performed by: STUDENT IN AN ORGANIZED HEALTH CARE EDUCATION/TRAINING PROGRAM

## 2019-11-08 PROCEDURE — 80061 LIPID PANEL: CPT | Performed by: STUDENT IN AN ORGANIZED HEALTH CARE EDUCATION/TRAINING PROGRAM

## 2019-11-08 PROCEDURE — 87086 URINE CULTURE/COLONY COUNT: CPT | Performed by: STUDENT IN AN ORGANIZED HEALTH CARE EDUCATION/TRAINING PROGRAM

## 2019-11-08 PROCEDURE — 83690 ASSAY OF LIPASE: CPT | Performed by: STUDENT IN AN ORGANIZED HEALTH CARE EDUCATION/TRAINING PROGRAM

## 2019-11-08 PROCEDURE — 25000128 H RX IP 250 OP 636: Performed by: STUDENT IN AN ORGANIZED HEALTH CARE EDUCATION/TRAINING PROGRAM

## 2019-11-08 PROCEDURE — 81001 URINALYSIS AUTO W/SCOPE: CPT | Performed by: STUDENT IN AN ORGANIZED HEALTH CARE EDUCATION/TRAINING PROGRAM

## 2019-11-08 PROCEDURE — 74019 RADEX ABDOMEN 2 VIEWS: CPT

## 2019-11-08 PROCEDURE — 36592 COLLECT BLOOD FROM PICC: CPT | Performed by: STUDENT IN AN ORGANIZED HEALTH CARE EDUCATION/TRAINING PROGRAM

## 2019-11-08 PROCEDURE — C9113 INJ PANTOPRAZOLE SODIUM, VIA: HCPCS | Performed by: STUDENT IN AN ORGANIZED HEALTH CARE EDUCATION/TRAINING PROGRAM

## 2019-11-08 PROCEDURE — 12000014 ZZH R&B PEDS UMMC

## 2019-11-08 RX ORDER — SENNOSIDES 8.6 MG
2 TABLET ORAL 2 TIMES DAILY PRN
Qty: 60 EACH | Refills: 1
Start: 2019-11-08 | End: 2022-01-07

## 2019-11-08 RX ORDER — POLYETHYLENE GLYCOL 3350 17 G/17G
17 POWDER, FOR SOLUTION ORAL DAILY
Qty: 30 PACKET | Refills: 0
Start: 2019-11-08 | End: 2022-01-07

## 2019-11-08 RX ORDER — MORPHINE SULFATE 2 MG/ML
2 INJECTION, SOLUTION INTRAMUSCULAR; INTRAVENOUS EVERY 4 HOURS
Status: DISCONTINUED | OUTPATIENT
Start: 2019-11-08 | End: 2019-11-08

## 2019-11-08 RX ORDER — MORPHINE SULFATE 2 MG/ML
1 INJECTION, SOLUTION INTRAMUSCULAR; INTRAVENOUS EVERY 4 HOURS PRN
Status: DISCONTINUED | OUTPATIENT
Start: 2019-11-08 | End: 2019-11-08

## 2019-11-08 RX ORDER — ONDANSETRON 2 MG/ML
8 INJECTION INTRAMUSCULAR; INTRAVENOUS EVERY 6 HOURS
Status: DISCONTINUED | OUTPATIENT
Start: 2019-11-08 | End: 2019-11-10

## 2019-11-08 RX ORDER — MORPHINE SULFATE 2 MG/ML
2 INJECTION, SOLUTION INTRAMUSCULAR; INTRAVENOUS ONCE
Status: COMPLETED | OUTPATIENT
Start: 2019-11-08 | End: 2019-11-08

## 2019-11-08 RX ORDER — NALOXONE HYDROCHLORIDE 0.4 MG/ML
.1-.4 INJECTION, SOLUTION INTRAMUSCULAR; INTRAVENOUS; SUBCUTANEOUS
Status: DISCONTINUED | OUTPATIENT
Start: 2019-11-08 | End: 2019-11-17 | Stop reason: HOSPADM

## 2019-11-08 RX ORDER — SODIUM CHLORIDE, SODIUM LACTATE, POTASSIUM CHLORIDE, CALCIUM CHLORIDE 600; 310; 30; 20 MG/100ML; MG/100ML; MG/100ML; MG/100ML
INJECTION, SOLUTION INTRAVENOUS CONTINUOUS
Status: DISCONTINUED | OUTPATIENT
Start: 2019-11-08 | End: 2019-11-09

## 2019-11-08 RX ORDER — ONDANSETRON 4 MG/1
8 TABLET, ORALLY DISINTEGRATING ORAL EVERY 6 HOURS
Status: DISCONTINUED | OUTPATIENT
Start: 2019-11-08 | End: 2019-11-10

## 2019-11-08 RX ORDER — NALOXONE HYDROCHLORIDE 0.4 MG/ML
.1-.4 INJECTION, SOLUTION INTRAMUSCULAR; INTRAVENOUS; SUBCUTANEOUS
Status: DISCONTINUED | OUTPATIENT
Start: 2019-11-08 | End: 2019-11-10

## 2019-11-08 RX ORDER — MORPHINE SULFATE 2 MG/ML
2 INJECTION, SOLUTION INTRAMUSCULAR; INTRAVENOUS EVERY 4 HOURS PRN
Status: DISCONTINUED | OUTPATIENT
Start: 2019-11-08 | End: 2019-11-08

## 2019-11-08 RX ADMIN — ENOXAPARIN SODIUM 60 MG: 60 INJECTION SUBCUTANEOUS at 19:52

## 2019-11-08 RX ADMIN — LORAZEPAM 2 MG: 2 INJECTION, SOLUTION INTRAMUSCULAR; INTRAVENOUS at 18:13

## 2019-11-08 RX ADMIN — PANTOPRAZOLE SODIUM 40 MG: 40 INJECTION, POWDER, FOR SOLUTION INTRAVENOUS at 19:52

## 2019-11-08 RX ADMIN — PROCHLORPERAZINE EDISYLATE 10 MG: 5 INJECTION INTRAMUSCULAR; INTRAVENOUS at 11:16

## 2019-11-08 RX ADMIN — ENOXAPARIN SODIUM 60 MG: 60 INJECTION SUBCUTANEOUS at 09:21

## 2019-11-08 RX ADMIN — ONDANSETRON 8 MG: 2 INJECTION INTRAMUSCULAR; INTRAVENOUS at 20:49

## 2019-11-08 RX ADMIN — MORPHINE SULFATE 2 MG: 2 INJECTION, SOLUTION INTRAMUSCULAR; INTRAVENOUS at 09:51

## 2019-11-08 RX ADMIN — DIPHENHYDRAMINE HYDROCHLORIDE 50 MG: 50 INJECTION, SOLUTION INTRAMUSCULAR; INTRAVENOUS at 08:26

## 2019-11-08 RX ADMIN — ONDANSETRON 8 MG: 2 INJECTION INTRAMUSCULAR; INTRAVENOUS at 14:52

## 2019-11-08 RX ADMIN — SODIUM CHLORIDE, POTASSIUM CHLORIDE, SODIUM LACTATE AND CALCIUM CHLORIDE: 600; 310; 30; 20 INJECTION, SOLUTION INTRAVENOUS at 15:53

## 2019-11-08 RX ADMIN — Medication: at 12:02

## 2019-11-08 RX ADMIN — DEXTROSE AND SODIUM CHLORIDE: 5; 900 INJECTION, SOLUTION INTRAVENOUS at 02:03

## 2019-11-08 RX ADMIN — LORAZEPAM 2 MG: 2 INJECTION, SOLUTION INTRAMUSCULAR; INTRAVENOUS at 09:11

## 2019-11-08 RX ADMIN — ONDANSETRON 8 MG: 2 INJECTION INTRAMUSCULAR; INTRAVENOUS at 08:40

## 2019-11-08 RX ADMIN — PANTOPRAZOLE SODIUM 40 MG: 40 INJECTION, POWDER, FOR SOLUTION INTRAVENOUS at 08:35

## 2019-11-08 RX ADMIN — MELATONIN 2000 UNITS: at 09:24

## 2019-11-08 NOTE — PROGRESS NOTES
SW covering for primary SWer, Ermelinda Aquino, [facility]. SW received phone call from pt's mother, Carmen inquiring about parking as Lazaro was admitted with pancreatitis and will likely be her several more days. SW provided donated weekly parking pass. Primary SW to follow up.    CHEY Jones, [facility]  Pediatric Hem/Onc   Phone: [phone]  Pager: [phone]

## 2019-11-08 NOTE — DISCHARGE SUMMARY
[facility], [place]  Discharge Summary - Medicine & Pediatrics       Date of Admission:  11/7/2019  Date of Discharge:  {DISCHARGE DATE:082693}  Discharging Provider: Bethanie Wong  Discharge Service: Pediatric Hematology Oncology    Discharge Diagnoses    -Asparaginase associated pancreatitis  -T lymphoblastic lymphoma  -DVT  -Immunosuppressed due to chemotherapy  -Dehydration     Follow-ups Needed After Discharge     Unresulted Labs Ordered in the Past 30 Days of this Admission     Date and Time Order Name Status Description    11/10/2019 1604 Blood culture Preliminary     11/10/2019 0311 Blood culture Preliminary       These results will be followed up by hematology oncology team    Discharge Disposition   Discharged to home  Condition at discharge: Stable    Hospital Course   Lazaro Lund is a 19 year old male admitted on 11/07/19 for abdominal pain and dehydration. 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 He was due to start Cycle 1 day 29 but this will be delayed due to his presenting symptoms.     GI  Asparaginase associated pancreatitis   On admission Lazaro's laboratory work was reassuring against pancreatitis with a normal amylase and lipase near the upper limit of normal. However on hospital day 2 his pain and vomiting acutely worsened and repeat lab work demonstrated lipase >3x ULN at 2560. Danielas pain related to pancreatitis was managed with a morphine PCA and he was hydrated with IV fluids at 1.25x maintenance with Lactated Ringers. His nausea was managed with multiple anti-emetics. The gastroenterology team was consulted and followed closely during admission. Due to SIRS with hypotension requiring pressor support, he was transferred to the PICU. He initially required norepinephrine on transfer, which was subsequently weaned with no further blood pressure concerns. Given his elevated risk for  pancreatic pseudocyst formation and necrosis, repeat imaging was obtained on 11/10 which demonstrated a 3cm focus of necrosis. No loculated necrotic collection, internal hemorrhage, venous thrombosis, or pseudoaneurysm. Repeat imaging planned for 11/24, 2 weeks after his last CT. After his blood pressure and respiratory status stabilized, he was transferred from the PICU back to the floor.     ID  Concern for infection in setting of pancreatic necrosis  Lazaro was treated with cefepime and metronidazole for 3 days while in the PICU. Blood and urine cultures were obtained and showed now growth. These antibiotics were discontinued after he was afebrile for 48 hours.     RESP  Pulmonary edema and pleural effusion  Lazaro developed pulmonary edema and pleural effusions due to capillary leak and fluid overload in setting of acute pancreatitis with SIRS. He required CPAP for several days while in the PICU and was subsequently weaned to room air. He remained stable on room air through the remainder of his admission. ***     HEME/ONC  T cell lymphoblastic lymphoma - VHR - treated per protocol [id number], D29 of consolidation [id number] Consolidation.  Lazaro was due for Cycle 6 D29 delayed of therapy on 11/7, which was delayed due to his admission or pancreatitis. Resumption of his chemotherapy will be determined by his outpatient hematology oncology team.     DVT of the right upper extremities  Lazaro was continued on his home lovenox 60mg every 12 hours during his admission. He required increased doses during his acute pancreatitis, up to 100 mg every 12 hours***, due to subtherapeutic heparin 10a levels. At the time of discharge, his lovenox dose was ***. His DVTs were monitored with ultrasound during his admission. Most recent US showed (stable thrombi on 11/13). He was noted to have low ATIII during his admission (thought to be secondary to peg-asparaginase), given stability of his clots on US and therapeutic Xa levels a  repeat ATC was not checked during admission.     PCP ppx  Lazaro was continued on his home Bactrim every Monday and Tuesday during admission      FEN:    PICU course: He was started on TPN due to anticipation of prolonged NPO course. Enteral feeds were subsequently started through NJ, and TPN was discontinued after he advanced to full enteral feeds. As he improved, he was allowed to eat orally ad chase, but had minimal PO intake. Enteral feeds were discontinued on 11/14 and NJ was removed.    Lazaro required significant diuresis with lasix during his PICU course due to fluid overload. Electrolytes were monitored closely and replaced as needed. He diuresed successfully, and was ***near dry weight by the time of discharge.     During admission Breanna constipation was managed with him miralax and senna     NEURO:  Abdominal pain due to pancreatitis  Breanna pain was managed during admission with a morphine PCA, which was then transitioned to oral oxycodone on 11/15 along with scheduled tylenol. At time of discharge he is taking *** for pain management    Lazaro had significant nausea during admission with was managed with zofran, benadryl, compazine and ativan. Marinol was added on 11/15 to work as both an anti-emetic and an appetite stimulant. At time of discharge his nausea is being managed with ***.     Consultations This Hospital Stay   PEDS GASTROENTEROLOGY IP CONSULT  PEDS NEPHROLOGY IP CONSULT  PEDS HEM/ONC IP CONSULT  PHARMACY/NUTRITION TO START AND MANAGE TPN  PHYSICAL THERAPY PEDS IP CONSULT  INTERVENTIONAL RADIOLOGY ADULT/PEDS IP CONSULT  PEDS PACCT (PAIN AND ADVANCED/COMPLEX CARE TEAM) IP CONSULT  PEDS INTEGRATIVE HEALTH IP CONSULT    Code Status   Prior     The patient was discussed with  ***    Marely Godinez MD  {Team:[id number]} Service  [facility], [place]  Pager: [phone]  ______________________________________________________________________    Physical Exam   Vital Signs: Temp:  99  F (37.2  C) Temp src: Oral BP: 127/78 Pulse: 92 Heart Rate: 92 Resp: 18 SpO2: 95 % O2 Device: None (Room air)    Weight: 166 lbs 10.68 oz  {OPTIONAL -- recommend using personal SmartPhrase or Notewriter for exam    :[id number]}  [id number]      Primary Care Physician   Rodriguez Montoya    Discharge Orders      Home infusion referral      Activity    Your activity upon discharge: activity as tolerated     IV access    **Ordering Provider MUST call/page Care Coordinator/ to discuss arranging this service**    You are going home with the following vascular access device: Port-a-Cath.     When to contact your care team    If concerns or new fevers call [phone] and ask to speak with the Hematology Oncology Fellow     Follow Up and recommended labs and tests    Follow up on 11/21 in [facility] onc clinic     Discharge Instructions    1. Continue to take a daily acid blocker - Pantoprazole to help with abdominal pain, this should be taken in the morning prior to breakfast    2. Continue to take oxycodone as needed for abdominal pain, while you are taking this medicine it is important to take your miralax as it can cause constipation    3. Nausea medications. Use zofran first for nausea, if this is not effective try benadryl next, if both of these are not effective try Ativan third    4. Your dose of lovenox has been increased, please take the new dose twice daily as prescribed.     Reason for your hospital stay    Lazaro was hospitalized for significant dehydration and vomiting and found to have pancreatitis related to his recent chemotherapy with Pegaspariginase     Diet    Follow this diet upon discharge: Normal Diet       Significant Results and Procedures   {Data for Discharge Summary:822124}    Discharge Medications   Current Discharge Medication List      START taking these medications    Details   acetaminophen (TYLENOL) 325 MG tablet Take 2 tablets (650 mg) by mouth every 6 hours as needed for mild  pain  Qty: 60 tablet, Refills: 0    Associated Diagnoses: Drug-induced acute pancreatitis with uninfected necrosis      dronabinol (MARINOL) 2.5 MG capsule Take 1 capsule (2.5 mg) by mouth 2 times daily  Qty: 60 capsule, Refills: 0    Associated Diagnoses: 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 pantoprazole (PROTONIX) 40 MG EC tablet Take 1 tablet (40 mg) by mouth every morning (before breakfast)  Qty: 30 tablet, Refills: 0    Associated Diagnoses: Drug-induced acute pancreatitis with uninfected necrosis       !! - Potential duplicate medications found. Please discuss with provider.      CONTINUE these medications which have CHANGED    Details   !! 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 - Potential duplicate medications found. Please discuss with provider.      CONTINUE these medications which have NOT CHANGED    Details   diphenhydrAMINE (BENADRYL) 25 MG capsule Take 1-2 capsules (25-50 mg) by mouth every 6 hours as needed (Breakthrough Nausea and Vomiting )  Qty: 30 capsule, Refills: 1    Associated Diagnoses: Lymphoma, unspecified body region, unspecified lymphoma type (H)      !! 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 pantoprazole (PROTONIX) 40 MG EC tablet Take 1 tablet (40 mg) by mouth daily  Qty: 30 tablet, Refills: 0    Associated Diagnoses: T lymphoblastic lymphoma (H)      scopolamine (TRANSDERM) 1 MG/3DAYS 72 hr patch Place 1 patch onto the skin every 72 hours  Qty: 10 patch, Refills: 3    Associated Diagnoses: T lymphoblastic lymphoma (H)      sulfamethoxazole-trimethoprim (BACTRIM DS/SEPTRA DS) 800-160 MG tablet Take 1 tablet by mouth Every Mon, Tues two times daily  Qty: 16 tablet, Refills: 3      Vitamin D, Cholecalciferol, 1000 units TABS Take 2 tablets (2,000 Units) by mouth daily  Qty: 60 tablet, Refills: 3    Associated Diagnoses: Vitamin D deficiency       !! - Potential duplicate medications found. Please discuss with provider.        Allergies   Allergies   Allergen Reactions     No Known Drug Allergies

## 2019-11-08 NOTE — PROGRESS NOTES
[facility], [place]    Progress Note - Pediatric Hematology Oncology Service        Date of Admission:  11/7/2019    Assessment & Plan   Lazaro Lund is a 19 year old male admitted on 11/07/19 for intractable nausea, abdominal pain and severe dehydration. 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 He was due to start Cycle 1 day 29 but this will be delayed due to his presenting symptoms.      On admission, Lazaro's presenting symptoms and recent history of treatment with PEGasparaginase were consistent with acute pancreatitis, but abdominal ultrasound and labs were not indicative of this diagnosis. However, today his pain and vomiting acutely worsened and repeat lab work demonstrated lipase >3xULN. His picture is consistent with symptomatic asparaginase associate acute pancreatitis. He will require continue admission for IV pain management, IV hydration and expectant management. He is at risk for pancreatic pseudocyst formation or pancreatic necrosis and will be monitored closely for these complications     GI  Asparaginase associated pancreatitis - Lipase >3xULN this morning, significant upper abdominal pain and intractable vomiting.  - IV protonix 40mg daily ( BID today)  - clear liquid diet - advance as tolerated, stop if pain worsens  - GI consulting appreciate recs  - Morphine PCA for pain management  - IVF @1.25M LR    HEME/ONC  T cell lymphoblastic lymphoma - VHR - treated per protocol [id number], D29 of consolidation [id number] Consolidation. Not neutropenic on admission   - Cycle 1 D29 delayed until at least 11/14     DVT of the bilateral upper extremities  -Continue home lovenox 60mg Q12     PCP ppx  -Bactrim QM/T (may need to hold next week depending on persistent of nausea and vomiting)     Nausea  -Zofran Q6  -Benadryl PRN  -Scopolamine patch in place  -Ativan PRN  -Compazine PRN     At risk for  chemotherapy induced cytopenias  - maintain hgb > 7  - maintain plt count > 30 d/t lovenox therapy     FEN:  Dehydration due to poor oral intake  -LR @125ml/hr  -Stict I/O  -clear liquid diet - advance as tolerated  -PTA vitamin D     H/O constipation  -Miralax daily PRN  -Senokot daily PRN     NEURO:  Abdominal pain 2/2 pancreatitis  -Morphine PCA  -Tylenol PRN   -PTA Melatonin     DVT Prophylaxis: Low Risk/Ambulatory with no additional VTE prophylaxis indicated given Enoxaparin (Lovenox) subcutaneous treatment  Lara Catheter: not present  Code Status: Full    Disposition Plan   Expected discharge: > 7 days, recommended to prior living arrangement once adequate pain management/ tolerating PO medications.  Entered: Marely Godinez MD 11/08/2019, 11:42 AM     The patient's care was discussed with the Attending Physician, Dr. Reynaldo Campuzano.    Marely Godinez MD  PL3 - Pediatrics  [facility]  pager [phone]    Physician Attestation     I, Reynadlo Campuzano MD, saw this patient with the resident and agree with the resident s findings and plan of care as documented in the resident s note.       I personally reviewed vital signs, medications, labs and imaging (as applicable).     Reynaldo Campuzano MD  Pediatric Hematology/Oncology  [facility]  Date of Service (when I saw the patient): 11/8/2019  ______________________________________________________________________    Interval History      NAEO, tolerated some oral intake last evening after anti-emetics and did well overnight, however upon waking this morning pain had acutely worsened especially in the epigastric region. Little relief experienced with IV anti-emetics. Labs obtained and concerning for pancreatitis. Pain management plan put in place, GI consulted. Lazaro remains afebrile.    Data reviewed today: I reviewed all medications, new labs and imaging results over the last 24 hours.     Physical Exam   Vital  Signs: Temp: 98.1  F (36.7  C) Temp src: Oral BP: 113/73 Pulse: 62   Resp: 16 SpO2: 100 % O2 Device: None (Room air)    Weight: 162 lbs .61 oz    EXAM:  GENERAL: lying in bed, in significant pain, distressed  SKIN: Clear. No significant rash, abnormal pigmentation or lesions  HEAD: Normocephalic  EYES: EOMI, no injection  EARS: Normal external canals.  NOSE: Normal without discharge.  LUNGS: Clear. No rales, rhonchi, wheezing or retractions  HEART: Regular rhythm. Normal S1/S2. No murmurs. Normal pulses.  ABDOMEN: Soft, flat, non-distended. Bowel sounds hypoactive. Exquisite pain in epigastric region with placement of stethoscope, unable to palpate due to pain.  NEUROLOGIC: No focal findings.    Data   Recent Labs   Lab 11/08/19  0951 11/07/19  0802   WBC 3.6* 3.2*   HGB 9.4* 9.4*   MCV 80 78    399    140   POTASSIUM 3.2* 3.6   CHLORIDE 109 107   CO2 23 25   BUN 11 17   CR 0.75 0.69   ANIONGAP 8 8   MICHAEL 7.8* 8.1*   * 114*   ALBUMIN 3.2* 3.3*   PROTTOTAL 5.1* 5.4*   BILITOTAL 0.6 0.5   ALKPHOS 81 85   ALT 61 54   AST 32 27   LIPASE 2,560* 372     Recent Results (from the past 24 hour(s))   XR Abdomen Port 2 Views    Narrative    EXAMINATION: XR ABDOMEN PORT 2 VW, 11/8/2019 10:12 AM     COMPARISON: 10/1/2019 abdominal CT     HISTORY: Significant increase in abdominal pain    FINDINGS:  Supine and left lateral decubitus views of the abdomen obtained  portably. Nonspecific mild bowel gas distention. No free air. Small to  moderate amount of colonic stool.      Impression    IMPRESSION:  Nonspecific bowel gas pattern appears overall nonobstructive. No free  air.    I have personally reviewed the examination and initial interpretation  and I agree with the findings.    FATOUMATA PINK MD     Medications     dextrose 5% and 0.9% NaCl 125 mL/hr at 11/08/19 1106     morphine         enoxaparin ANTICOAGULANT  60 mg Subcutaneous Q12H     melatonin  3 mg Oral At Bedtime     ondansetron  8 mg Intravenous Q6H     Or     ondansetron  8 mg Oral Q6H     pantoprazole (PROTONIX) IV  40 mg Intravenous Once     pantoprazole (PROTONIX) IV  40 mg Intravenous Daily with breakfast     scopolamine  1 patch Transdermal Q72H     scopolamine   Transdermal Q8H     [START ON 11/10/2019] scopolamine   Transdermal Q72H     [START ON 11/11/2019] sulfamethoxazole-trimethoprim  1 tablet Oral Q Mon Tues BID     Vitamin D3  2,000 Units Oral Daily

## 2019-11-08 NOTE — PLAN OF CARE
Pt. Experiencing increased nausea, vomiting and  abdominal pain on day shift. After several attempts to provide relief  (benadryl,ativan,zofran and compazine and one time dose of IV morphine) order was written for PCA morphine to initiate. He was able to get OOB for shower but has had no appreciable intake of solids or fluids. Pain issues ,N/V seem improved at this time as he was able to take longer naps. Mom present all shift. Will continue pain management, IV fluids and notify Blue team accordingly if pain worsens or N/V persist.

## 2019-11-08 NOTE — PLAN OF CARE
VSS. Pt C/O headache, tylenol x1 with relief. Abdominal pain and nausea improving. Tolerating small amounts of PO intake. No emesis reported. Void x2, urine remains concentrated. Patient sleeping on and off throughout the night. Mom present at bedside. Hourly rounding completed. Continue plan of care.

## 2019-11-08 NOTE — PLAN OF CARE
Afebrile, VSS.  Pt. Denies pain.  No nausea reported overnight.  Pt. Sleeping though the night.  Continue to monitor.  Notify team of any changes or concerns.

## 2019-11-08 NOTE — CONSULTS
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    Amylase and lipase on admission were non-revealing (53 and 372 respectively), but have risen this morning (148 and 2560 respectively).  While portions of the pancreas appeared normal on US on 11/7, given symptoms and lipase >3xULN, I would recommend to manage symptomatically for pancreatitis.  No evidence of contributing biliary disease on US and normal t bili and ALT.    Asparaginase associated pancreatitis (AAP) can occur in 5-10% of treated individuals and seems a dose-dependent response occurring within 10wks of starting therapy and usually after 5-7 doses.  Lazaro last received on 10/24 and had received 2 doses total.  It can be associated with complications including pseudocyst (25%) or pancreatic necrosis.  Long-term, it can be associated with chronic pancreatitis and diabetes.    Recommendations--  -While AAP is usually dose / duration dependent, it can occur at any time.  -IV fluids at 125mL/hr and monitor for brisk UOP.  Consider use of fluids with LR as these can reduce SIRS response.  Increase and bolus as needed to ensure adequate fluid resuscitation.  -Add on TGs to (preferably fasting) labs as this can also be a complication with PEG-asparaginase therapy and can interfere with testing assays (amylase) for pancreatitis.  -Early enteral intake has been shown to improve outcomes.  Allow oral intake as tolerated, but if it increases pain, would limit to clears or make NPO.    -If unable to tolerate intake over the next few days, consider placement of NG / NJ to allow  supplemental nutrition.  -Pain management with PCA if appropriate.  -Monitor for complications including pseudocyst and necrosis.    -Consider repeating imaging (US first; advanced cross-sectional imaging if needed) in a few days.  -Monitor blood glucose, WBC, Ca, albumin as markers of severity.    -If considering re-introduction of PEG-asparaginase, would plan for close monitoring after dose for symptoms, rise in amylase and/or lipase, and any radiographic findings.  If AAP recurs, he would be unable to utilize this therapy ongoing.    Recommendations discussed with primary team resident, Dr Godinez, and attending, Dr Campuzano.  We will continue to follow along with you.  Please do not hesitate to contact us with any additional questions or concerns.    Shannon Aguilar MD MPH    Pediatric Gastroenterology, Hepatology, and Nutrition  [facility]    Reason for Consult   Reason for consult: I was asked by Dr Campuzano to evaluate this patient for asparaginase induced pancreatitis.    Primary Care Physician   Rodriguez Montoya    Chief Complaint   Pancreatitis    History is obtained from the patient and the patient's parent(s)    History of Present Illness   Lazaro Lund is a 19 year old male with very high risk T cell lymphoblastic lymphoma being treated per protocol [id number] in the consolidation phase, which includes PEG-asparaginase.  He has received two doses so far. He presents for abdominal pain, vomiting and dehydration.      Lazaro was well until 11/5 when he began having nausea with intermittent vomiting. He was able to manage it at home until the evening of 11/6 when he was unable to keep anything down including water. His vomit is non-bloody non-bilious and looks like partially digested food. He has also had central upper abdominal pain and reports back discomfort.  He was not previously on an acid blocker at home. He has been using his bowel regimen  PRN and held his morning dose of lovenox the AM of 11/7 as instructed prior to his planned LP.      He denies fevers, constipation, diarrhea, headaches, URI symptoms, or cough. He has been feeling dizzy when he stands up. He has no known sick contacts.     In clinic on 11/7, Lazaro was noted to be tachycardic but not febrile. He received a 1L NS bolus x2, IV protonix, 8mg of zofran and IV benadryl 50mg with improvement in his symptoms.  Treatment with cyclophosphamide and cytoxan were deferred given symptoms, and he was admitted to the hospital in the afternoon.  He did try to eat applesauce and drink in clinic, but this led to more nausea.  US completed in the morning on 11/7 with:  1. Nonspecific bladder debris.  2. Hepatic steatosis.  3. No cholelithiasis, choledocholithiasis or biliary obstruction.    He has had complaints of headache since admission, but abdominal pain and nausea had seemed somewhat improved at least overnight.  Increasing nausea, vomiting, and abdominal pain on day shift today with antiemetics (benadryl, zofran, ativan) and pain relief (IV morphine) provided.  Repeat labs with elevated lipase at 2560 (nl<393, 6.5xULN) and amylase at 148 (nl<110).    Sleeping comfortably currently.    Past Medical History    I have reviewed this patient's medical history and updated it with pertinent information if needed.   Past Medical History:   Diagnosis Date     DVT of upper extremity (deep vein thrombosis) (H) 09/26/2019    Bilateral      Edema of upper extremity 10/17/2019     Folliculitis 10/17/2019     Migraine 2006    have resolved     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       Past Surgical History   I have reviewed this patient's surgical history and updated it with pertinent information if needed.  Past Surgical History:   Procedure Laterality Date     BONE MARROW BIOPSY, BONE SPECIMEN, NEEDLE/TROCAR Left 9/1/2019    Procedure: BIOPSY, BONE MARROW;  Surgeon: Heather Lopez MD;  Location:  [facility] OR     INSERT PICC LINE N/A 8/31/2019    Procedure: INSERTION, PICC;  Surgeon: Michell Keith MD;  Location: [facility] OR     INSERT PORT VASCULAR ACCESS N/A 10/24/2019    Procedure: INSERTION, VASCULAR ACCESS PORT;  Surgeon: Silviano Martins MD;  Location: [facility] PEDS SEDATION      IR CHEST PORT PLACEMENT > 5 YRS OF AGE  10/24/2019     IR CHEST TUBE PLACEMENT NON-TUNNELLED LEFT  8/31/2019     IR PICC PLACEMENT > 5 YRS OF AGE  8/31/2019     SPINAL PUNCTURE,LUMBAR, INTRATHECAL CHEMO DELIVERY N/A 8/31/2019    Procedure: LUMBAR PUNCTURE, WITH INTRATHECAL CHEMOTHERAPY ADMINISTRATION;  Surgeon: Heather Lopez MD;  [facility]: [facility] OR     SPINAL PUNCTURE,LUMBAR, INTRATHECAL CHEMO DELIVERY N/A 9/9/2019    Procedure: Lumbar Puncture With Intrathecal Chemo;  Surgeon: Alexi Hayes MD;  [facility]: [facility] OR     SPINAL PUNCTURE,LUMBAR, INTRATHECAL CHEMO DELIVERY N/A 10/10/2019    Procedure: Lumbar puncture with IT Chemo (CD);  Surgeon: Reynaldo Campuzano MD;  [facility]      SPINAL PUNCTURE,LUMBAR, INTRATHECAL CHEMO DELIVERY N/A 10/17/2019    Procedure: Lumbar puncture with IT Chemo (not CD);  Surgeon: Reynaldo Campuzano MD;  [facility]      SPINAL PUNCTURE,LUMBAR, INTRATHECAL CHEMO DELIVERY N/A 10/24/2019    Procedure: LUMBAR PUNCTURE, WITH INTRATHECAL CHEMOTHERAPY ADMINISTRATION;  Surgeon: Félix Van, APRN CNP;  [facility]      SPINAL PUNCTURE,LUMBAR, INTRATHECAL CHEMO DELIVERY N/A 10/31/2019    Procedure: Lumbar puncture with IT Chemo (not CD);  Surgeon: Reynaldo Campuzano MD;  [facility]      THORACENTESIS N/A 8/31/2019    Procedure: Thoracentesis;  Surgeon: Michell Keith MD;  [facility]       Immunization History   Immunization Status:  Immunization History   Administered Date(s) Administered     DTAP (<7y) 1998, 04/27/1999, 02/10/2000, 11/05/2002, 08/25/2003     HEPA 08/19/2010, 04/11/2011     HepB 1998,  1998, 04/27/1999     Hib (PRP-T) 1998, 04/27/1999, 02/10/2000     Influenza Vaccine IM > 6 months Valent IIV4 10/03/2019     MMR 02/10/2000, 08/25/2003     Meningococcal (Menactra ) 08/19/2010     OPV, trivalent, live 04/27/1999     Poliovirus, inactivated (IPV) 1998, 02/10/2000, 08/25/2003     TDAP Vaccine (Adacel) 04/11/2011     Varicella 08/19/2010, 04/11/2011       Prior to Admission Medications   Prior to Admission Medications   Prescriptions Last Dose Informant Patient Reported? Taking?   Vitamin D, Cholecalciferol, 1000 units TABS   No No   Sig: Take 2 tablets (2,000 Units) by mouth daily   diphenhydrAMINE (BENADRYL) 25 MG capsule   No No   Sig: Take 1-2 capsules (25-50 mg) by mouth every 6 hours as needed (Breakthrough Nausea and Vomiting )   enoxaparin (LOVENOX) 60 MG/0.6ML syringe   No No   Sig: Inject 0.6 mLs (60 mg) Subcutaneous every 12 hours   melatonin 3 MG tablet   No No   Sig: Take 1 tablet (3 mg) by mouth At Bedtime   ondansetron (ZOFRAN) 4 MG tablet   No No   Sig: Take 2 tablets (8 mg) by mouth every 6 hours as needed (nausea / vomiting)   ondansetron (ZOFRAN) 8 MG tablet   No No   Sig: Take 1 tablet (8 mg) by mouth every 6 hours as needed for nausea   pantoprazole (PROTONIX) 40 MG EC tablet   No No   Sig: Take 1 tablet (40 mg) by mouth daily   polyethylene glycol (MIRALAX/GLYCOLAX) packet   No No   Sig: Take 17 g by mouth daily   Patient taking differently: Take 17 g by mouth daily as needed    scopolamine (TRANSDERM) 1 MG/3DAYS 72 hr patch   No No   Sig: Place 1 patch onto the skin every 72 hours   sennosides (SENOKOT) 8.6 MG tablet   No No   Sig: Take 2 tablets by mouth 2 times daily as needed for constipation   sulfamethoxazole-trimethoprim (BACTRIM DS/SEPTRA DS) 800-160 MG tablet   No No   Sig: Take 1 tablet by mouth Every Mon, Tues two times daily      Facility-Administered Medications Last Administration Doses Remaining   0.9% sodium chloride BOLUS None recorded 1         Allergies   Allergies   Allergen Reactions     No Known Drug Allergies        Social History   I have reviewed this patient's social history and updated it with pertinent information if needed.  -Lazaro previously lived at home with his dad and step mom in [place] but is now staying with his mom in [place].  He was working full time at [employer] in [place] prior to his diagnosis.    Family History   I have reviewed this patient's family history and updated it with pertinent information if needed.   Family History   Problem Relation Age of Onset     No Known Problems Mother      No Known Problems Father      Asthma Brother      Thyroid Cancer Paternal Grandmother      Melanoma Paternal Aunt        Review of Systems   The 10 point Review of Systems is negative other than noted in the HPI or here.    Physical Exam   Temp: 98.1  F (36.7  C) Temp src: Oral BP: 113/73 Pulse: 62   Resp: 16 SpO2: 100 % O2 Device: None (Room air)    Vital Signs with Ranges  Temp:  [98.1  F (36.7  C)-98.8  F (37.1  C)] 98.1  F (36.7  C)  Pulse:  [56-86] 62  Resp:  [16-28] 16  BP: (103-130)/(58-84) 113/73  SpO2:  [98 %-100 %] 100 %  162 lbs .61 oz    General: sleeping comfortably, stirs briefly with exam  HEENT: normocephalic, atraumatic; nares clear without congestion or rhinorrhea; moist mucous membranes  CV: regular rate and rhythm, no murmurs, brisk cap refill  Resp: lungs clear to auscultation bilaterally, normal respiratory effort on room air  Abd: soft, non-distended, flinches with exam in epigastrium, normoactive bowel sounds  Neuro: sleeping comfortably  Skin: no significant rashes or lesions on limited skin exam    Data   Results for orders placed or performed during the hospital encounter of 11/07/19 (from the past 24 hour(s))   CBC with platelets differential   Result Value Ref Range    WBC 3.6 (L) 4.0 - 11.0 10e9/L    RBC Count 3.42 (L) 4.4 - 5.9 10e12/L    Hemoglobin 9.4 (L) 13.3 - 17.7 g/dL    Hematocrit 27.3 (L) 40.0 -  53.0 %    MCV 80 78 - 100 fl    MCH 27.5 26.5 - 33.0 pg    MCHC 34.4 31.5 - 36.5 g/dL    RDW 16.6 (H) 10.0 - 15.0 %    Platelet Count 391 150 - 450 10e9/L    Diff Method Manual Differential     % Neutrophils 68.2 %    % Lymphocytes 19.1 %    % Monocytes 12.7 %    % Eosinophils 0.0 %    % Basophils 0.0 %    Nucleated RBCs 4 (H) 0 /100    Absolute Neutrophil 2.5 1.6 - 8.3 10e9/L    Absolute Lymphocytes 0.7 (L) 0.8 - 5.3 10e9/L    Absolute Monocytes 0.5 0.0 - 1.3 10e9/L    Absolute Eosinophils 0.0 0.0 - 0.7 10e9/L    Absolute Basophils 0.0 0.0 - 0.2 10e9/L    Absolute Nucleated RBC 0.1     Anisocytosis Slight     Poikilocytosis Slight     Polychromasia Slight     Acanthocytes Slight     Microcytes Present     Platelet Estimate Confirming automated cell count    Amylase   Result Value Ref Range    Amylase 148 (H) 30 - 110 U/L   Comprehensive metabolic panel   Result Value Ref Range    Sodium 140 133 - 144 mmol/L    Potassium 3.2 (L) 3.4 - 5.3 mmol/L    Chloride 109 94 - 109 mmol/L    Carbon Dioxide 23 20 - 32 mmol/L    Anion Gap 8 3 - 14 mmol/L    Glucose 115 (H) 70 - 99 mg/dL    Urea Nitrogen 11 7 - 30 mg/dL    Creatinine 0.75 0.66 - 1.25 mg/dL    GFR Estimate >90 >60 mL/min/[1.73_m2]    GFR Estimate If Black >90 >60 mL/min/[1.73_m2]    Calcium 7.8 (L) 8.5 - 10.1 mg/dL    Bilirubin Total 0.6 0.2 - 1.3 mg/dL    Albumin 3.2 (L) 3.4 - 5.0 g/dL    Protein Total 5.1 (L) 6.8 - 8.8 g/dL    Alkaline Phosphatase 81 40 - 150 U/L    ALT 61 0 - 70 U/L    AST 32 0 - 45 U/L   Lipase   Result Value Ref Range    Lipase 2,560 (H) 73 - 393 U/L   XR Abdomen Port 2 Views    Narrative    EXAMINATION: XR ABDOMEN PORT 2 VW, 11/8/2019 10:12 AM     COMPARISON: 10/1/2019 abdominal CT     HISTORY: Significant increase in abdominal pain    FINDINGS:  Supine and left lateral decubitus views of the abdomen obtained  portably. Nonspecific mild bowel gas distention. No free air. Small to  moderate amount of colonic stool.      Impression     IMPRESSION:  Nonspecific bowel gas pattern appears overall nonobstructive. No free  air.    I have personally reviewed the examination and initial interpretation  and I agree with the findings.    FATOUMATA PINK MD

## 2019-11-09 LAB
ALBUMIN SERPL-MCNC: 2.8 G/DL (ref 3.4–5)
AMYLASE SERPL-CCNC: 406 U/L (ref 30–110)
ANION GAP SERPL CALCULATED.3IONS-SCNC: 7 MMOL/L (ref 3–14)
ANION GAP SERPL CALCULATED.3IONS-SCNC: 9 MMOL/L (ref 3–14)
BACTERIA SPEC CULT: NO GROWTH
BASOPHILS # BLD AUTO: 0 10E9/L (ref 0–0.2)
BASOPHILS NFR BLD AUTO: 0.1 %
BUN SERPL-MCNC: 17 MG/DL (ref 7–30)
BUN SERPL-MCNC: 21 MG/DL (ref 7–30)
CALCIUM SERPL-MCNC: 6.2 MG/DL (ref 8.5–10.1)
CALCIUM SERPL-MCNC: 7.5 MG/DL (ref 8.5–10.1)
CHLORIDE SERPL-SCNC: 106 MMOL/L (ref 94–109)
CHLORIDE SERPL-SCNC: 108 MMOL/L (ref 94–109)
CO2 SERPL-SCNC: 20 MMOL/L (ref 20–32)
CO2 SERPL-SCNC: 25 MMOL/L (ref 20–32)
CREAT SERPL-MCNC: 0.7 MG/DL (ref 0.66–1.25)
CREAT SERPL-MCNC: 0.82 MG/DL (ref 0.66–1.25)
DIFFERENTIAL METHOD BLD: ABNORMAL
EOSINOPHIL # BLD AUTO: 0 10E9/L (ref 0–0.7)
EOSINOPHIL NFR BLD AUTO: 0 %
ERYTHROCYTE [DISTWIDTH] IN BLOOD BY AUTOMATED COUNT: 18.7 % (ref 10–15)
GFR SERPL CREATININE-BSD FRML MDRD: >90 ML/MIN/{1.73_M2}
GFR SERPL CREATININE-BSD FRML MDRD: >90 ML/MIN/{1.73_M2}
GLUCOSE SERPL-MCNC: 114 MG/DL (ref 70–99)
GLUCOSE SERPL-MCNC: 140 MG/DL (ref 70–99)
HCT VFR BLD AUTO: 36.8 % (ref 40–53)
HGB BLD-MCNC: 12.6 G/DL (ref 13.3–17.7)
IMM GRANULOCYTES # BLD: 0.1 10E9/L (ref 0–0.4)
IMM GRANULOCYTES NFR BLD: 1.4 %
LIPASE SERPL-CCNC: 4112 U/L (ref 73–393)
LYMPHOCYTES # BLD AUTO: 0.6 10E9/L (ref 0.8–5.3)
LYMPHOCYTES NFR BLD AUTO: 6.7 %
Lab: NORMAL
MCH RBC QN AUTO: 27.8 PG (ref 26.5–33)
MCHC RBC AUTO-ENTMCNC: 34.2 G/DL (ref 31.5–36.5)
MCV RBC AUTO: 81 FL (ref 78–100)
MONOCYTES # BLD AUTO: 1.4 10E9/L (ref 0–1.3)
MONOCYTES NFR BLD AUTO: 15.1 %
NEUTROPHILS # BLD AUTO: 7.3 10E9/L (ref 1.6–8.3)
NEUTROPHILS NFR BLD AUTO: 76.7 %
NRBC # BLD AUTO: 0.1 10*3/UL
NRBC BLD AUTO-RTO: 2 /100
PLATELET # BLD AUTO: 383 10E9/L (ref 150–450)
POTASSIUM SERPL-SCNC: 3.9 MMOL/L (ref 3.4–5.3)
POTASSIUM SERPL-SCNC: 4.2 MMOL/L (ref 3.4–5.3)
RBC # BLD AUTO: 4.54 10E12/L (ref 4.4–5.9)
SODIUM SERPL-SCNC: 137 MMOL/L (ref 133–144)
SODIUM SERPL-SCNC: 138 MMOL/L (ref 133–144)
SPECIMEN SOURCE: NORMAL
WBC # BLD AUTO: 9.5 10E9/L (ref 4–11)

## 2019-11-09 PROCEDURE — 80048 BASIC METABOLIC PNL TOTAL CA: CPT | Performed by: STUDENT IN AN ORGANIZED HEALTH CARE EDUCATION/TRAINING PROGRAM

## 2019-11-09 PROCEDURE — 25000132 ZZH RX MED GY IP 250 OP 250 PS 637: Performed by: STUDENT IN AN ORGANIZED HEALTH CARE EDUCATION/TRAINING PROGRAM

## 2019-11-09 PROCEDURE — C9113 INJ PANTOPRAZOLE SODIUM, VIA: HCPCS | Performed by: STUDENT IN AN ORGANIZED HEALTH CARE EDUCATION/TRAINING PROGRAM

## 2019-11-09 PROCEDURE — 25000125 ZZHC RX 250: Performed by: STUDENT IN AN ORGANIZED HEALTH CARE EDUCATION/TRAINING PROGRAM

## 2019-11-09 PROCEDURE — 25000128 H RX IP 250 OP 636: Performed by: STUDENT IN AN ORGANIZED HEALTH CARE EDUCATION/TRAINING PROGRAM

## 2019-11-09 PROCEDURE — 85025 COMPLETE CBC W/AUTO DIFF WBC: CPT | Performed by: STUDENT IN AN ORGANIZED HEALTH CARE EDUCATION/TRAINING PROGRAM

## 2019-11-09 PROCEDURE — 25800030 ZZH RX IP 258 OP 636: Performed by: STUDENT IN AN ORGANIZED HEALTH CARE EDUCATION/TRAINING PROGRAM

## 2019-11-09 PROCEDURE — 36592 COLLECT BLOOD FROM PICC: CPT | Performed by: STUDENT IN AN ORGANIZED HEALTH CARE EDUCATION/TRAINING PROGRAM

## 2019-11-09 PROCEDURE — 00000146 ZZHCL STATISTIC GLUCOSE BY METER IP

## 2019-11-09 PROCEDURE — 82040 ASSAY OF SERUM ALBUMIN: CPT | Performed by: STUDENT IN AN ORGANIZED HEALTH CARE EDUCATION/TRAINING PROGRAM

## 2019-11-09 PROCEDURE — 82150 ASSAY OF AMYLASE: CPT | Performed by: STUDENT IN AN ORGANIZED HEALTH CARE EDUCATION/TRAINING PROGRAM

## 2019-11-09 PROCEDURE — 83690 ASSAY OF LIPASE: CPT | Performed by: STUDENT IN AN ORGANIZED HEALTH CARE EDUCATION/TRAINING PROGRAM

## 2019-11-09 PROCEDURE — 12000014 ZZH R&B PEDS UMMC

## 2019-11-09 RX ORDER — SODIUM CHLORIDE 9 MG/ML
INJECTION, SOLUTION INTRAVENOUS
Status: DISCONTINUED
Start: 2019-11-09 | End: 2019-11-10 | Stop reason: HOSPADM

## 2019-11-09 RX ORDER — ACETAMINOPHEN 325 MG/1
650 TABLET ORAL EVERY 6 HOURS
Status: DISCONTINUED | OUTPATIENT
Start: 2019-11-09 | End: 2019-11-17 | Stop reason: HOSPADM

## 2019-11-09 RX ORDER — FUROSEMIDE 10 MG/ML
20 INJECTION INTRAMUSCULAR; INTRAVENOUS ONCE
Status: COMPLETED | OUTPATIENT
Start: 2019-11-09 | End: 2019-11-09

## 2019-11-09 RX ORDER — SODIUM CHLORIDE, SODIUM LACTATE, POTASSIUM CHLORIDE, CALCIUM CHLORIDE 600; 310; 30; 20 MG/100ML; MG/100ML; MG/100ML; MG/100ML
INJECTION, SOLUTION INTRAVENOUS CONTINUOUS
Status: ACTIVE | OUTPATIENT
Start: 2019-11-09 | End: 2019-11-11

## 2019-11-09 RX ADMIN — ACETAMINOPHEN 650 MG: 325 TABLET, FILM COATED ORAL at 20:00

## 2019-11-09 RX ADMIN — ENOXAPARIN SODIUM 60 MG: 60 INJECTION SUBCUTANEOUS at 09:26

## 2019-11-09 RX ADMIN — SODIUM CHLORIDE, POTASSIUM CHLORIDE, SODIUM LACTATE AND CALCIUM CHLORIDE 1000 ML: 600; 310; 30; 20 INJECTION, SOLUTION INTRAVENOUS at 23:23

## 2019-11-09 RX ADMIN — FUROSEMIDE 20 MG: 10 INJECTION, SOLUTION INTRAMUSCULAR; INTRAVENOUS at 21:56

## 2019-11-09 RX ADMIN — SCOPALAMINE 1 PATCH: 1 PATCH, EXTENDED RELEASE TRANSDERMAL at 01:37

## 2019-11-09 RX ADMIN — ONDANSETRON 8 MG: 2 INJECTION INTRAMUSCULAR; INTRAVENOUS at 15:27

## 2019-11-09 RX ADMIN — DIPHENHYDRAMINE HYDROCHLORIDE 50 MG: 50 INJECTION, SOLUTION INTRAMUSCULAR; INTRAVENOUS at 01:31

## 2019-11-09 RX ADMIN — SODIUM CHLORIDE, POTASSIUM CHLORIDE, SODIUM LACTATE AND CALCIUM CHLORIDE: 600; 310; 30; 20 INJECTION, SOLUTION INTRAVENOUS at 00:07

## 2019-11-09 RX ADMIN — MELATONIN 2000 UNITS: at 09:13

## 2019-11-09 RX ADMIN — ACETAMINOPHEN 650 MG: 325 TABLET, FILM COATED ORAL at 11:02

## 2019-11-09 RX ADMIN — PROCHLORPERAZINE EDISYLATE 10 MG: 5 INJECTION INTRAMUSCULAR; INTRAVENOUS at 16:15

## 2019-11-09 RX ADMIN — ONDANSETRON 8 MG: 2 INJECTION INTRAMUSCULAR; INTRAVENOUS at 09:12

## 2019-11-09 RX ADMIN — SODIUM CHLORIDE, POTASSIUM CHLORIDE, SODIUM LACTATE AND CALCIUM CHLORIDE: 600; 310; 30; 20 INJECTION, SOLUTION INTRAVENOUS at 23:07

## 2019-11-09 RX ADMIN — ENOXAPARIN SODIUM 60 MG: 60 INJECTION SUBCUTANEOUS at 20:16

## 2019-11-09 RX ADMIN — PANTOPRAZOLE SODIUM 40 MG: 40 INJECTION, POWDER, FOR SOLUTION INTRAVENOUS at 09:11

## 2019-11-09 RX ADMIN — ONDANSETRON 8 MG: 2 INJECTION INTRAMUSCULAR; INTRAVENOUS at 21:05

## 2019-11-09 RX ADMIN — PROCHLORPERAZINE EDISYLATE 10 MG: 5 INJECTION INTRAMUSCULAR; INTRAVENOUS at 21:59

## 2019-11-09 RX ADMIN — SODIUM CHLORIDE 1000 ML: 9 INJECTION, SOLUTION INTRAVENOUS at 13:51

## 2019-11-09 RX ADMIN — SODIUM CHLORIDE, POTASSIUM CHLORIDE, SODIUM LACTATE AND CALCIUM CHLORIDE: 600; 310; 30; 20 INJECTION, SOLUTION INTRAVENOUS at 07:52

## 2019-11-09 RX ADMIN — ONDANSETRON 8 MG: 2 INJECTION INTRAMUSCULAR; INTRAVENOUS at 02:43

## 2019-11-09 RX ADMIN — SODIUM CHLORIDE 1000 ML: 9 INJECTION, SOLUTION INTRAVENOUS at 09:09

## 2019-11-09 RX ADMIN — PROCHLORPERAZINE EDISYLATE 10 MG: 5 INJECTION INTRAMUSCULAR; INTRAVENOUS at 11:02

## 2019-11-09 RX ADMIN — SODIUM CHLORIDE, SODIUM LACTATE, POTASSIUM CHLORIDE, CALCIUM CHLORIDE AND DEXTROSE MONOHYDRATE: 5; 600; 310; 30; 20 INJECTION, SOLUTION INTRAVENOUS at 18:27

## 2019-11-09 ASSESSMENT — MIFFLIN-ST. JEOR: SCORE: 1838.51

## 2019-11-09 NOTE — PROGRESS NOTES
[facility] [place]    Progress Note - Pediatric Hematology Oncology Service        Date of Admission:  11/7/2019    Assessment & Plan   Lazaro Lund is a 19 year old male admitted on 11/07/19 for intractable nausea, abdominal pain and severe dehydration. He is currently being treated for very high risk T cell lymphoblastic lymphoma per protocol [id number] Consolidation. He was due to start Cycle 1 day 29 but this will be delayed due to his presenting symptoms. Lazaro remains admitted for symptomatic asparaginase associated pancreatitis with elevated lipase, amylase and significant abdominal pain and nausea. He will require continue admission for IV pain management, IV hydration and expectant management. He is at risk for pancreatic pseudocyst formation or pancreatic necrosis and will be monitored closely for these complications.     Today:  - NS bolus x2, bladder scan demonstrated only 126ml after NS bolus  - Compazine scheduled due to ongoing nausea  - Tylenol scheduled  - Fluids to 150ml/hr    GI  Asparaginase associated pancreatitis - Lipase >3xULN, significant upper abdominal pain and intractable vomiting.  - IV protonix 40mg daily  - Clear liquid diet - advance as tolerated, stop if pain worsens  - GI consulting appreciate recs  - Morphine PCA for pain management  - IVF @1.5M D5 LR  - Abd US monday 11/11  - Repeat labs Monday - albumin, lipase, amylase, BMP    HEME/ONC  T cell lymphoblastic lymphoma - VHR - treated per protocol [id number], D29 of consolidation [id number] Consolidation. Not neutropenic on admission   - Cycle 1 D29 delayed until at least 11/14     DVT of the bilateral upper extremities  -Continue home lovenox 60mg Q12     PCP ppx  -Bactrim QM/T (may need to hold next week depending on persistent of nausea and vomiting)     Nausea  -Zofran Q6 scheduled  -Compazine Q6 scheduled  -Benadryl PRN  -Scopolamine patch in place  -Ativan - discontinued due to sedation     At  risk for chemotherapy induced cytopenias  - maintain hgb > 7  - maintain plt count > 30 d/t lovenox therapy     FEN:  Dehydration due to poor oral intake  -NS bolus x2 today  -D5 LR @150ml/hr  -Stict I/O  -clear liquid diet - advance as tolerated  -PTA vitamin D     H/O constipation - consider scheduling tomorrow if no stool - at risk of opioid induced constipation  -Miralax daily PRN  -Senokot daily PRN     NEURO:  Abdominal pain 2/2 pancreatitis  -Morphine PCA  -Tylenol Q6  -PTA Melatonin    CV:  EKG obtained 11/8 for elevated HR, noted to have prolonged QTC of 478  -Repeat EKG on 11/10     DVT Prophylaxis: Low Risk/Ambulatory with no additional VTE prophylaxis indicated given Enoxaparin (Lovenox) subcutaneous treatment  Lara Catheter: not present  Code Status: Full    Disposition Plan   Expected discharge: > 7 days, recommended to prior living arrangement once adequate pain management/ tolerating PO medications.  Entered: Marely Godinez MD 11/09/2019, 8:25 AM     The patient's care was discussed with the Attending Physician, Dr. Reynaldo Campuzano.    Marely Godinez MD  PL3 - Pediatrics  [facility]  pager [phone]    Physician Attestation     I, Reynaldo Campuzano MD, saw this patient with the resident and agree with the resident s findings and plan of care as documented in the resident s note.       I personally reviewed vital signs, medications, labs and imaging (as applicable).     Reynaldo Campuzano MD  Pediatric Hematology/Oncology  [facility]  Date of Service (when I saw the patient): 11/9/2019    ______________________________________________________________________    Interval History    Continued to have significant pain overnight. Received ativan + morphine drip and nursing was concerned about somnolence. Tachycardic --> EKG obtained and reassuring. Low UOP overnight, NS bolus x2 today.    Data reviewed today: I reviewed all medications, new labs and  imaging results over the last 24 hours.     Physical Exam   Vital Signs: Temp: 99.5  F (37.5  C) Temp src: Axillary BP: 99/67 Pulse: 141   Resp: 24 SpO2: 95 % O2 Device: None (Room air) Oxygen Delivery: 1 LPM  Weight: 162 lbs .61 oz    EXAM:  GENERAL: lying in bed, seems comfortable but stoic   SKIN: Clear. No significant rash, abnormal pigmentation or lesions  HEAD: Normocephalic  EYES: eyes closed  EARS: Normal external canals.  NOSE: Normal without discharge.  LUNGS: Clear. No rales, rhonchi, wheezing or retractions. Good air movement throughout  HEART: Regular rhythm. Normal S1/S2. No murmurs. Normal pulses.  ABDOMEN: Soft, flat, non-distended. Bowel sounds hypoactive. Generalized abdominal pain improved, continued epigastric pain with light palpation but improved overall.  NEUROLOGIC: No focal findings. Appropriately alert.    Data   Recent Labs   Lab 11/09/19  0630 11/08/19  0951 11/07/19  0802   WBC 9.5 3.6* 3.2*   HGB 12.6* 9.4* 9.4*   MCV 81 80 78    391 399    140 140   POTASSIUM 4.2 3.2* 3.6   CHLORIDE 106 109 107   CO2 25 23 25   BUN 17 11 17   CR 0.70 0.75 0.69   ANIONGAP 7 8 8   MICHAEL 7.5* 7.8* 8.1*   * 115* 114*   ALBUMIN 2.8* 3.2* 3.3*   PROTTOTAL  --  5.1* 5.4*   BILITOTAL  --  0.6 0.5   ALKPHOS  --  81 85   ALT  --  61 54   AST  --  32 27   LIPASE 4,112* 2,560* 372     Recent Results (from the past 24 hour(s))   XR Abdomen Port 2 Views    Narrative    EXAMINATION: XR ABDOMEN PORT 2 VW, 11/8/2019 10:12 AM     COMPARISON: 10/1/2019 abdominal CT     HISTORY: Significant increase in abdominal pain    FINDINGS:  Supine and left lateral decubitus views of the abdomen obtained  portably. Nonspecific mild bowel gas distention. No free air. Small to  moderate amount of colonic stool.      Impression    IMPRESSION:  Nonspecific bowel gas pattern appears overall nonobstructive. No free  air.    I have personally reviewed the examination and initial interpretation  and I agree with the  findings.    FATOUMATA PINK MD     Medications     lactated ringers 125 mL/hr at 11/09/19 0752     morphine         enoxaparin ANTICOAGULANT  60 mg Subcutaneous Q12H     melatonin  3 mg Oral At Bedtime     ondansetron  8 mg Intravenous Q6H    Or     ondansetron  8 mg Oral Q6H     pantoprazole (PROTONIX) IV  40 mg Intravenous Daily with breakfast     scopolamine  1 patch Transdermal Q72H     scopolamine   Transdermal Q8H     [START ON 11/10/2019] scopolamine   Transdermal Q72H     [START ON 11/11/2019] sulfamethoxazole-trimethoprim  1 tablet Oral Q Mon Tues BID     Vitamin D3  2,000 Units Oral Daily

## 2019-11-09 NOTE — PLAN OF CARE
No issues with pain on day shift,no changes made with morphine delivery. No emesis or c/o nausea. He did receive 2 NS boluses but his HR remains in 120's -130/min and he has not voided ( Blue team notified). Will continue to encourage him to do so and keep team informed.

## 2019-11-09 NOTE — PROVIDER NOTIFICATION
11/08/19 2230   Oxygen Therapy   SpO2 (!) 86 %   O2 Device None (Room air)     Notified provider of persistent desaturations as low as 86% on RA. Oxymask applied at 1LPM with good response. Provider at bedside to assess. Will continue to monitor closely.

## 2019-11-09 NOTE — PROVIDER NOTIFICATION
11/08/19 1930   Cognitive   Cognitive/Neuro/Behavioral WDL ex;arousability;level of consciousness   Level Of Consciousness sedated;somnolent   Arousal Level arouses to vigorous stimulation   Orientation person;place   Follows Commands inconsistent   Speech slow;slurred     Provider notified of increased sedation and disorientation. Pupils round and reactive 3mm. Face symmetrical and moving all four extremities with repeated stimulation. Falls asleep while responding to writer. Intermittent apneic episodes of 2-3secs noted with eventual recovery. Tachycardic HR 120s. MD at bedside to assess. PRN ativan discontinued. Will continue to monitor closely and initiate falls precautions.

## 2019-11-09 NOTE — PLAN OF CARE
[phone]   Upon arrival, patient lethargic and somnolent. Within first half-hour, patient appeared increasingly somnolent, falling asleep mid-sentence and requiring repeated stimulation to arouse. Tachycardic with -150s at rest and denying pain on morphine PCA. Tachypneic with RR 25-28. BP stable. Sats % on RA except for one incidence of persistent desaturation as low as 86% that did not improve with repositioning or encouragement to take deep breath. Oxymask applied with eventual recovery and was able to wean promptly to RA. MD at bedside, see provider notifications.    Pulses weak in all four extremities. Extremities cool. Cap refill <2 seconds. Preliminary EKG showed right axis deviation and possible right ventricular hypertrophy. Plan to continue with Q2H vitals through rest of night.     Port infusing fluids well; reinforced for peeling edges. Pain well controlled on morphine PCA with occasional bumps. Neuro status gradually improved with increased awareness but continues to be confused. Fair PO fluid intake. No PO food intake. Emesis x1 and many instances of dry retching; PRN Benadryl x1 with some relief. Fair urine output. No stool output. Mom at bedside and very attentive and appropriate. Will continue to monitor closely and monitor MD of any changes.

## 2019-11-10 ENCOUNTER — APPOINTMENT (OUTPATIENT)
Dept: CT IMAGING | Facility: CLINIC | Age: 19
End: 2019-11-10
Attending: STUDENT IN AN ORGANIZED HEALTH CARE EDUCATION/TRAINING PROGRAM
Payer: MEDICAID

## 2019-11-10 ENCOUNTER — APPOINTMENT (OUTPATIENT)
Dept: GENERAL RADIOLOGY | Facility: CLINIC | Age: 19
End: 2019-11-10
Attending: STUDENT IN AN ORGANIZED HEALTH CARE EDUCATION/TRAINING PROGRAM
Payer: MEDICAID

## 2019-11-10 ENCOUNTER — APPOINTMENT (OUTPATIENT)
Dept: CARDIOLOGY | Facility: CLINIC | Age: 19
End: 2019-11-10
Attending: STUDENT IN AN ORGANIZED HEALTH CARE EDUCATION/TRAINING PROGRAM
Payer: MEDICAID

## 2019-11-10 ENCOUNTER — APPOINTMENT (OUTPATIENT)
Dept: ULTRASOUND IMAGING | Facility: CLINIC | Age: 19
End: 2019-11-10
Attending: STUDENT IN AN ORGANIZED HEALTH CARE EDUCATION/TRAINING PROGRAM
Payer: MEDICAID

## 2019-11-10 ENCOUNTER — APPOINTMENT (OUTPATIENT)
Dept: GENERAL RADIOLOGY | Facility: CLINIC | Age: 19
End: 2019-11-10
Attending: PEDIATRICS
Payer: MEDICAID

## 2019-11-10 LAB
ALBUMIN SERPL-MCNC: 2 G/DL (ref 3.4–5)
ALBUMIN SERPL-MCNC: 2.3 G/DL (ref 3.4–5)
ALBUMIN SERPL-MCNC: 2.5 G/DL (ref 3.4–5)
ALP SERPL-CCNC: 72 U/L (ref 40–150)
ALT SERPL W P-5'-P-CCNC: 58 U/L (ref 0–70)
ANION GAP SERPL CALCULATED.3IONS-SCNC: 6 MMOL/L (ref 3–14)
ANION GAP SERPL CALCULATED.3IONS-SCNC: 8 MMOL/L (ref 3–14)
APTT PPP: 50 SEC (ref 22–37)
APTT PPP: 60 SEC (ref 22–37)
AST SERPL W P-5'-P-CCNC: 40 U/L (ref 0–45)
BASE DEFICIT BLDV-SCNC: 2.1 MMOL/L
BASE EXCESS BLDV CALC-SCNC: 4.6 MMOL/L
BASOPHILS # BLD AUTO: 0 10E9/L (ref 0–0.2)
BASOPHILS # BLD AUTO: 0 10E9/L (ref 0–0.2)
BASOPHILS NFR BLD AUTO: 0.1 %
BASOPHILS NFR BLD AUTO: 0.1 %
BILIRUB SERPL-MCNC: 0.5 MG/DL (ref 0.2–1.3)
BUN SERPL-MCNC: 22 MG/DL (ref 7–30)
BUN SERPL-MCNC: 22 MG/DL (ref 7–30)
CA-I BLD-MCNC: 4.1 MG/DL (ref 4.4–5.2)
CALCIUM SERPL-MCNC: 6.5 MG/DL (ref 8.5–10.1)
CALCIUM SERPL-MCNC: 6.7 MG/DL (ref 8.5–10.1)
CHLORIDE SERPL-SCNC: 103 MMOL/L (ref 94–109)
CHLORIDE SERPL-SCNC: 104 MMOL/L (ref 94–109)
CO2 SERPL-SCNC: 23 MMOL/L (ref 20–32)
CO2 SERPL-SCNC: 26 MMOL/L (ref 20–32)
CREAT SERPL-MCNC: 0.79 MG/DL (ref 0.66–1.25)
CREAT SERPL-MCNC: 0.88 MG/DL (ref 0.66–1.25)
CRP SERPL-MCNC: 151 MG/L (ref 0–8)
CRP SERPL-MCNC: 98 MG/L (ref 0–8)
D DIMER PPP FEU-MCNC: 4.5 UG/ML FEU (ref 0–0.5)
DIFFERENTIAL METHOD BLD: ABNORMAL
DIFFERENTIAL METHOD BLD: ABNORMAL
EOSINOPHIL # BLD AUTO: 0 10E9/L (ref 0–0.7)
EOSINOPHIL # BLD AUTO: 0 10E9/L (ref 0–0.7)
EOSINOPHIL NFR BLD AUTO: 0 %
EOSINOPHIL NFR BLD AUTO: 0 %
ERYTHROCYTE [DISTWIDTH] IN BLOOD BY AUTOMATED COUNT: 19.8 % (ref 10–15)
ERYTHROCYTE [DISTWIDTH] IN BLOOD BY AUTOMATED COUNT: 20.4 % (ref 10–15)
FIBRINOGEN PPP-MCNC: 71 MG/DL (ref 200–420)
FIBRINOGEN PPP-MCNC: 88 MG/DL (ref 200–420)
GFR SERPL CREATININE-BSD FRML MDRD: >90 ML/MIN/{1.73_M2}
GFR SERPL CREATININE-BSD FRML MDRD: >90 ML/MIN/{1.73_M2}
GLUCOSE SERPL-MCNC: 104 MG/DL (ref 70–99)
GLUCOSE SERPL-MCNC: 93 MG/DL (ref 70–99)
HCO3 BLDV-SCNC: 23 MMOL/L (ref 21–28)
HCO3 BLDV-SCNC: 30 MMOL/L (ref 21–28)
HCT VFR BLD AUTO: 25.3 % (ref 40–53)
HCT VFR BLD AUTO: 29.9 % (ref 40–53)
HGB BLD-MCNC: 8.7 G/DL (ref 13.3–17.7)
HGB BLD-MCNC: 9.8 G/DL (ref 13.3–17.7)
IMM GRANULOCYTES # BLD: 0 10E9/L (ref 0–0.4)
IMM GRANULOCYTES # BLD: 0.1 10E9/L (ref 0–0.4)
IMM GRANULOCYTES NFR BLD: 0.4 %
IMM GRANULOCYTES NFR BLD: 0.5 %
INR PPP: 1.61 (ref 0.86–1.14)
INR PPP: 1.82 (ref 0.86–1.14)
LACTATE BLD-SCNC: 1.7 MMOL/L (ref 0.7–2)
LACTATE BLD-SCNC: 2.8 MMOL/L (ref 0.7–2)
LMWH PPP CHRO-ACNC: 0.18 IU/ML
LYMPHOCYTES # BLD AUTO: 0.4 10E9/L (ref 0.8–5.3)
LYMPHOCYTES # BLD AUTO: 0.4 10E9/L (ref 0.8–5.3)
LYMPHOCYTES NFR BLD AUTO: 3.9 %
LYMPHOCYTES NFR BLD AUTO: 4.3 %
MCH RBC QN AUTO: 27.4 PG (ref 26.5–33)
MCH RBC QN AUTO: 28.3 PG (ref 26.5–33)
MCHC RBC AUTO-ENTMCNC: 32.8 G/DL (ref 31.5–36.5)
MCHC RBC AUTO-ENTMCNC: 34.4 G/DL (ref 31.5–36.5)
MCV RBC AUTO: 82 FL (ref 78–100)
MCV RBC AUTO: 84 FL (ref 78–100)
MONOCYTES # BLD AUTO: 1.1 10E9/L (ref 0–1.3)
MONOCYTES # BLD AUTO: 1.4 10E9/L (ref 0–1.3)
MONOCYTES NFR BLD AUTO: 12.5 %
MONOCYTES NFR BLD AUTO: 12.6 %
NEUTROPHILS # BLD AUTO: 7.1 10E9/L (ref 1.6–8.3)
NEUTROPHILS # BLD AUTO: 9.1 10E9/L (ref 1.6–8.3)
NEUTROPHILS NFR BLD AUTO: 82.6 %
NEUTROPHILS NFR BLD AUTO: 83 %
NRBC # BLD AUTO: 0 10*3/UL
NRBC # BLD AUTO: 0 10*3/UL
NRBC BLD AUTO-RTO: 0 /100
NRBC BLD AUTO-RTO: 0 /100
O2/TOTAL GAS SETTING VFR VENT: 25 %
O2/TOTAL GAS SETTING VFR VENT: NORMAL %
PCO2 BLDV: 42 MM HG (ref 40–50)
PCO2 BLDV: 45 MM HG (ref 40–50)
PH BLDV: 7.35 PH (ref 7.32–7.43)
PH BLDV: 7.43 PH (ref 7.32–7.43)
PLATELET # BLD AUTO: 254 10E9/L (ref 150–450)
PLATELET # BLD AUTO: 296 10E9/L (ref 150–450)
PO2 BLDV: 41 MM HG (ref 25–47)
PO2 BLDV: 42 MM HG (ref 25–47)
POTASSIUM SERPL-SCNC: 3.9 MMOL/L (ref 3.4–5.3)
POTASSIUM SERPL-SCNC: 4 MMOL/L (ref 3.4–5.3)
PROCALCITONIN SERPL-MCNC: 1.9 NG/ML
PROT SERPL-MCNC: 3.8 G/DL (ref 6.8–8.8)
RBC # BLD AUTO: 3.07 10E12/L (ref 4.4–5.9)
RBC # BLD AUTO: 3.58 10E12/L (ref 4.4–5.9)
SODIUM SERPL-SCNC: 135 MMOL/L (ref 133–144)
SODIUM SERPL-SCNC: 135 MMOL/L (ref 133–144)
WBC # BLD AUTO: 10.9 10E9/L (ref 4–11)
WBC # BLD AUTO: 8.6 10E9/L (ref 4–11)

## 2019-11-10 PROCEDURE — 40000141 ZZH STATISTIC PERIPHERAL IV START W/O US GUIDANCE

## 2019-11-10 PROCEDURE — 85730 THROMBOPLASTIN TIME PARTIAL: CPT | Performed by: STUDENT IN AN ORGANIZED HEALTH CARE EDUCATION/TRAINING PROGRAM

## 2019-11-10 PROCEDURE — 87086 URINE CULTURE/COLONY COUNT: CPT | Performed by: STUDENT IN AN ORGANIZED HEALTH CARE EDUCATION/TRAINING PROGRAM

## 2019-11-10 PROCEDURE — P9047 ALBUMIN (HUMAN), 25%, 50ML: HCPCS | Performed by: STUDENT IN AN ORGANIZED HEALTH CARE EDUCATION/TRAINING PROGRAM

## 2019-11-10 PROCEDURE — 80048 BASIC METABOLIC PNL TOTAL CA: CPT | Performed by: STUDENT IN AN ORGANIZED HEALTH CARE EDUCATION/TRAINING PROGRAM

## 2019-11-10 PROCEDURE — 25800030 ZZH RX IP 258 OP 636: Performed by: STUDENT IN AN ORGANIZED HEALTH CARE EDUCATION/TRAINING PROGRAM

## 2019-11-10 PROCEDURE — 71045 X-RAY EXAM CHEST 1 VIEW: CPT | Mod: 77

## 2019-11-10 PROCEDURE — 20300000 ZZH R&B PICU UMMC

## 2019-11-10 PROCEDURE — 36592 COLLECT BLOOD FROM PICC: CPT | Performed by: STUDENT IN AN ORGANIZED HEALTH CARE EDUCATION/TRAINING PROGRAM

## 2019-11-10 PROCEDURE — 82803 BLOOD GASES ANY COMBINATION: CPT | Performed by: STUDENT IN AN ORGANIZED HEALTH CARE EDUCATION/TRAINING PROGRAM

## 2019-11-10 PROCEDURE — 85384 FIBRINOGEN ACTIVITY: CPT | Performed by: STUDENT IN AN ORGANIZED HEALTH CARE EDUCATION/TRAINING PROGRAM

## 2019-11-10 PROCEDURE — 25000132 ZZH RX MED GY IP 250 OP 250 PS 637: Performed by: STUDENT IN AN ORGANIZED HEALTH CARE EDUCATION/TRAINING PROGRAM

## 2019-11-10 PROCEDURE — 87040 BLOOD CULTURE FOR BACTERIA: CPT | Performed by: STUDENT IN AN ORGANIZED HEALTH CARE EDUCATION/TRAINING PROGRAM

## 2019-11-10 PROCEDURE — 25000128 H RX IP 250 OP 636: Performed by: STUDENT IN AN ORGANIZED HEALTH CARE EDUCATION/TRAINING PROGRAM

## 2019-11-10 PROCEDURE — 85025 COMPLETE CBC W/AUTO DIFF WBC: CPT | Performed by: STUDENT IN AN ORGANIZED HEALTH CARE EDUCATION/TRAINING PROGRAM

## 2019-11-10 PROCEDURE — 25000125 ZZHC RX 250: Performed by: PEDIATRICS

## 2019-11-10 PROCEDURE — 84145 PROCALCITONIN (PCT): CPT | Performed by: STUDENT IN AN ORGANIZED HEALTH CARE EDUCATION/TRAINING PROGRAM

## 2019-11-10 PROCEDURE — 85379 FIBRIN DEGRADATION QUANT: CPT | Performed by: STUDENT IN AN ORGANIZED HEALTH CARE EDUCATION/TRAINING PROGRAM

## 2019-11-10 PROCEDURE — P9041 ALBUMIN (HUMAN),5%, 50ML: HCPCS

## 2019-11-10 PROCEDURE — 40001072 ZZH STATISTICAL VASC ACCESS NURSE TIME, 16-31 MINUTES

## 2019-11-10 PROCEDURE — 86140 C-REACTIVE PROTEIN: CPT | Performed by: STUDENT IN AN ORGANIZED HEALTH CARE EDUCATION/TRAINING PROGRAM

## 2019-11-10 PROCEDURE — 93005 ELECTROCARDIOGRAM TRACING: CPT

## 2019-11-10 PROCEDURE — 25000128 H RX IP 250 OP 636

## 2019-11-10 PROCEDURE — 74170 CT ABD WO CNTRST FLWD CNTRST: CPT

## 2019-11-10 PROCEDURE — 76700 US EXAM ABDOM COMPLETE: CPT

## 2019-11-10 PROCEDURE — C9113 INJ PANTOPRAZOLE SODIUM, VIA: HCPCS | Performed by: STUDENT IN AN ORGANIZED HEALTH CARE EDUCATION/TRAINING PROGRAM

## 2019-11-10 PROCEDURE — 82040 ASSAY OF SERUM ALBUMIN: CPT | Performed by: STUDENT IN AN ORGANIZED HEALTH CARE EDUCATION/TRAINING PROGRAM

## 2019-11-10 PROCEDURE — 83605 ASSAY OF LACTIC ACID: CPT | Performed by: STUDENT IN AN ORGANIZED HEALTH CARE EDUCATION/TRAINING PROGRAM

## 2019-11-10 PROCEDURE — 40000275 ZZH STATISTIC RCP TIME EA 10 MIN

## 2019-11-10 PROCEDURE — 85610 PROTHROMBIN TIME: CPT | Performed by: STUDENT IN AN ORGANIZED HEALTH CARE EDUCATION/TRAINING PROGRAM

## 2019-11-10 PROCEDURE — 25500064 ZZH RX 255 OP 636: Performed by: PEDIATRICS

## 2019-11-10 PROCEDURE — 93306 TTE W/DOPPLER COMPLETE: CPT

## 2019-11-10 PROCEDURE — 80053 COMPREHEN METABOLIC PANEL: CPT | Performed by: STUDENT IN AN ORGANIZED HEALTH CARE EDUCATION/TRAINING PROGRAM

## 2019-11-10 PROCEDURE — 71045 X-RAY EXAM CHEST 1 VIEW: CPT

## 2019-11-10 PROCEDURE — 40000281 ZZH STATISTIC TRANSPORT TIME EA 15 MIN

## 2019-11-10 PROCEDURE — P9041 ALBUMIN (HUMAN),5%, 50ML: HCPCS | Performed by: STUDENT IN AN ORGANIZED HEALTH CARE EDUCATION/TRAINING PROGRAM

## 2019-11-10 PROCEDURE — 82330 ASSAY OF CALCIUM: CPT | Performed by: STUDENT IN AN ORGANIZED HEALTH CARE EDUCATION/TRAINING PROGRAM

## 2019-11-10 PROCEDURE — 25000125 ZZHC RX 250: Performed by: STUDENT IN AN ORGANIZED HEALTH CARE EDUCATION/TRAINING PROGRAM

## 2019-11-10 PROCEDURE — 85520 HEPARIN ASSAY: CPT | Performed by: STUDENT IN AN ORGANIZED HEALTH CARE EDUCATION/TRAINING PROGRAM

## 2019-11-10 RX ORDER — ALBUMIN (HUMAN) 12.5 G/50ML
50 SOLUTION INTRAVENOUS ONCE
Status: DISCONTINUED | OUTPATIENT
Start: 2019-11-10 | End: 2019-11-10

## 2019-11-10 RX ORDER — ALBUMIN (HUMAN) 12.5 G/50ML
12.5 SOLUTION INTRAVENOUS ONCE
Status: COMPLETED | OUTPATIENT
Start: 2019-11-10 | End: 2019-11-10

## 2019-11-10 RX ORDER — ALBUMIN, HUMAN INJ 5% 5 %
1000 SOLUTION INTRAVENOUS ONCE
Status: DISCONTINUED | OUTPATIENT
Start: 2019-11-10 | End: 2019-11-10

## 2019-11-10 RX ORDER — LORAZEPAM 2 MG/ML
1-2 INJECTION INTRAMUSCULAR EVERY 6 HOURS PRN
Status: DISCONTINUED | OUTPATIENT
Start: 2019-11-10 | End: 2019-11-15

## 2019-11-10 RX ORDER — ALBUMIN, HUMAN INJ 5% 5 %
250 SOLUTION INTRAVENOUS ONCE
Status: COMPLETED | OUTPATIENT
Start: 2019-11-10 | End: 2019-11-10

## 2019-11-10 RX ORDER — IOPAMIDOL 612 MG/ML
100 INJECTION, SOLUTION INTRAVASCULAR ONCE
Status: COMPLETED | OUTPATIENT
Start: 2019-11-10 | End: 2019-11-10

## 2019-11-10 RX ORDER — ALBUMIN (HUMAN) 12.5 G/50ML
SOLUTION INTRAVENOUS
Status: DISCONTINUED
Start: 2019-11-10 | End: 2019-11-10 | Stop reason: HOSPADM

## 2019-11-10 RX ORDER — SODIUM CHLORIDE 9 MG/ML
INJECTION, SOLUTION INTRAVENOUS
Status: DISCONTINUED
Start: 2019-11-10 | End: 2019-11-11 | Stop reason: HOSPADM

## 2019-11-10 RX ORDER — ALBUMIN, HUMAN INJ 5% 5 %
SOLUTION INTRAVENOUS
Status: DISCONTINUED
Start: 2019-11-10 | End: 2019-11-10 | Stop reason: HOSPADM

## 2019-11-10 RX ORDER — ALBUMIN, HUMAN INJ 5% 5 %
SOLUTION INTRAVENOUS
Status: COMPLETED
Start: 2019-11-10 | End: 2019-11-10

## 2019-11-10 RX ORDER — NOREPINEPHRINE BITARTRATE 0.06 MG/ML
0.05 INJECTION, SOLUTION INTRAVENOUS CONTINUOUS
Status: DISCONTINUED | OUTPATIENT
Start: 2019-11-10 | End: 2019-11-10

## 2019-11-10 RX ADMIN — LORAZEPAM 1 MG: 2 INJECTION INTRAMUSCULAR; INTRAVENOUS at 09:10

## 2019-11-10 RX ADMIN — ACETAMINOPHEN 650 MG: 325 TABLET, FILM COATED ORAL at 20:47

## 2019-11-10 RX ADMIN — ACETAMINOPHEN 650 MG: 325 TABLET, FILM COATED ORAL at 13:51

## 2019-11-10 RX ADMIN — PROCHLORPERAZINE EDISYLATE 10 MG: 5 INJECTION INTRAMUSCULAR; INTRAVENOUS at 16:35

## 2019-11-10 RX ADMIN — ENOXAPARIN SODIUM 70 MG: 80 INJECTION SUBCUTANEOUS at 22:06

## 2019-11-10 RX ADMIN — Medication: at 03:13

## 2019-11-10 RX ADMIN — MELATONIN TAB 3 MG 3 MG: 3 TAB at 22:11

## 2019-11-10 RX ADMIN — ALBUMIN HUMAN: 50 SOLUTION INTRAVENOUS at 07:01

## 2019-11-10 RX ADMIN — ALBUMIN HUMAN: 0.05 INJECTION, SOLUTION INTRAVENOUS at 07:01

## 2019-11-10 RX ADMIN — PANTOPRAZOLE SODIUM 40 MG: 40 INJECTION, POWDER, FOR SOLUTION INTRAVENOUS at 09:15

## 2019-11-10 RX ADMIN — NOREPINEPHRINE BITARTRATE 0.05 MCG/KG/MIN: 1 INJECTION INTRAVENOUS at 10:00

## 2019-11-10 RX ADMIN — ENOXAPARIN SODIUM 60 MG: 60 INJECTION SUBCUTANEOUS at 09:13

## 2019-11-10 RX ADMIN — SODIUM CHLORIDE, POTASSIUM CHLORIDE, SODIUM LACTATE AND CALCIUM CHLORIDE: 600; 310; 30; 20 INJECTION, SOLUTION INTRAVENOUS at 18:09

## 2019-11-10 RX ADMIN — PROCHLORPERAZINE EDISYLATE 10 MG: 5 INJECTION INTRAMUSCULAR; INTRAVENOUS at 04:19

## 2019-11-10 RX ADMIN — CEFEPIME HYDROCHLORIDE 2 G: 2 INJECTION, POWDER, FOR SOLUTION INTRAVENOUS at 17:22

## 2019-11-10 RX ADMIN — SODIUM CHLORIDE 65 ML: 9 INJECTION, SOLUTION INTRAVENOUS at 19:14

## 2019-11-10 RX ADMIN — ALBUMIN HUMAN 12.5 G: 0.25 SOLUTION INTRAVENOUS at 02:40

## 2019-11-10 RX ADMIN — PROCHLORPERAZINE EDISYLATE 10 MG: 5 INJECTION INTRAMUSCULAR; INTRAVENOUS at 11:17

## 2019-11-10 RX ADMIN — IOPAMIDOL 98 ML: 612 INJECTION, SOLUTION INTRAVENOUS at 18:55

## 2019-11-10 RX ADMIN — PROCHLORPERAZINE EDISYLATE 10 MG: 5 INJECTION INTRAMUSCULAR; INTRAVENOUS at 22:16

## 2019-11-10 RX ADMIN — SODIUM CHLORIDE, POTASSIUM CHLORIDE, SODIUM LACTATE AND CALCIUM CHLORIDE: 600; 310; 30; 20 INJECTION, SOLUTION INTRAVENOUS at 06:33

## 2019-11-10 RX ADMIN — ONDANSETRON 8 MG: 4 TABLET, ORALLY DISINTEGRATING ORAL at 03:10

## 2019-11-10 RX ADMIN — METRONIDAZOLE 500 MG: 500 INJECTION, SOLUTION INTRAVENOUS at 18:07

## 2019-11-10 RX ADMIN — ALBUMIN HUMAN 250 ML: 0.05 INJECTION, SOLUTION INTRAVENOUS at 02:30

## 2019-11-10 RX ADMIN — ACETAMINOPHEN 650 MG: 325 TABLET, FILM COATED ORAL at 02:03

## 2019-11-10 ASSESSMENT — MIFFLIN-ST. JEOR: SCORE: 1867.51

## 2019-11-10 NOTE — CONSULTS
[facility], [place]    CONSULT Note - Pediatric Hematology Oncology Service        Date of Admission:  11/10/2019    Assessment & Plan   Lazaro Lund is a 19 year old male admitted on 11/07/19 for intractable nausea, abdominal pain and severe dehydration. 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 He was due to start Cycle 1 day 29 but this was delayed due to his presenting symptoms. Lazaro remains admitted for symptomatic asparaginase associated pancreatitis with elevated lipase, amylase and significant abdominal pain and nausea.  He was transferred to the PICU on the morning of 11/10 due to SIRs secondary to pancreatitis, and resulting in capillary leak with pleural effusions, ascites, hypotension, decreased UOP, and persistent tachycardia. He will require continue admission for IV pain management, IV hydration and expectant management. He is at risk for hypovolemic or cardiogenic shock, pancreatic pseudocyst formation or pancreatic necrosis, and will be monitored closely for these complications.     Today:  -Transferred to PICU  -Started on Norepi 0.05mcg/kg/min for hypotension  -Echo and EKG done. Echo reassuring, EF is 62%. EKG shows increased prolonged QTc thereofre, QTc prolonging meds (scheduled Zofran) were discontinued. Ativan added for antiemetic.   - Renal consult for KUNAL and concern for ATN related to severe pancreatitis  -Close monitoring of I/Os. Net + 4.5L yesterday, and weight gain of 10kg since admission.   -Pleural effusion R>L    GI  Asparaginase associated pancreatitis - Lipase >3xULN, significant upper abdominal pain and intractable vomiting.  - IV protonix 40mg daily  - Clear liquid diet - advance as tolerated, stop if pain worsens  - GI consulting appreciate recs  - Morphine PCA for pain management  - IVF LR @1.5M (150ml/hr)  - Repeat labs Monday - albumin, lipase, amylase, BMP  -  possible CT abdomen per GI     HEME/ONC  T cell lymphoblastic lymphoma - VHR - treated per protocol [id number], due for D29 of consolidation [id number] Consolidation. Not neutropenic on admission   - Cycle 1 D29 delayed until at least 11/14     DVT of the bilateral upper extremities  -Subtherapeutic level - Increase Lovenox from 60mg q12h to 70mg q12h   -Check hep Xa level 4 hours after the next dose (11/11 @ 0000).      PCP ppx  -Bactrim QM/T (may need to hold next week depending on persistent of nausea and vomiting)     Nausea  -Discontinue Zofran   -Compazine Q6 scheduled  -Ativan 1-2 mg 6h PRN   -Benadryl PRN  -Scopolamine patch in place     At risk for chemotherapy induced cytopenias  - maintain hgb > 7  - maintain plt count > 30 d/t lovenox therapy     FEN:  Dehydration due to poor oral intake  Capillary Leak   -Norepi 0.05mcg/kg/min  -LR @150ml/hr  -Stict I/O  -clear liquid diet - advance as tolerated  -PTA vitamin D     H/O constipation - consider scheduling tomorrow if no stool - at risk of opioid induced constipation  -Miralax daily PRN  -Senokot daily PRN     NEURO:  Abdominal pain 2/2 pancreatitis  -Morphine PCA  -Tylenol Q6  -PTA Melatonin    CV:  EKG obtained 11/8 for elevated HR, noted to have prolonged QTC of 478 that increased to 528 on 11/10  -avoid QTc prolonging medications  -Repeat EKG on 11/11    RESP:   Pleural efffusions 2/2 volume overload and capillary leak  - O2 as needed     DVT Prophylaxis: Low Risk/Ambulatory with no additional VTE prophylaxis indicated given Enoxaparin (Lovenox) subcutaneous treatment  Lara Catheter: not present  Code Status: Full    Patient was discussed with attending, Dr. Reynaldo Campuzano.     Brianne Hope DO  Pediatric Heme/Onc & BMT Fellow  Pager: [phone]    Physician Attestation    I saw and evaluated the patient. I discussed with the fellow and agree with the findings and plan as documented in the fellow's note.     Reynaldo Campuzano MD  Pediatric  Hematology/Oncology  [facility]  Date of Service (when I saw the patient): 11/10/2019    ______________________________________________________________________    Interval History    Ovenight, Lazaro had persistent hypotension with BPs down to 70/40s despite 3 IVF boluses (2 NS, 1 LR) in the last 24 hours in addition to LR running at 150ml/hr.  He was given one dose of Lasix 20mg IV which resulted in a small eyal void of 175ml. He has had no urine output since 9 or 10 PM. He was hypoalbuminemic and given 250 mL of 25% albumin x 2 and 250ml of 5% albumin x 1 with temporary improvements in blood pressure. He continued to feel nauseas throughout the night. He reports abdominal pain is well managed and continues on the Morphine PCA. Due to ongoing hypotension with concerns for capillary leak and fluid overload, he was transferred to PICU this morning. His blood pressures have improved since starting on a norepinephrine drip, bu the has not voided .    Data reviewed today: I reviewed all medications, new labs and imaging results over the last 24 hours.     Physical Exam   Vital Signs: Temp: 100.2  F (37.9  C) Temp src: Axillary BP: 109/57 Pulse: 116 Heart Rate: 112 Resp: 23 SpO2: 96 % O2 Device: None (Room air) Oxygen Delivery: 5 LPM  Weight: 182 lbs 15.71 oz    EXAM:  GENERAL: lying in bed, seems comfortable but stoic   SKIN: Clear. No significant rash, abnormal pigmentation or lesions  HEAD: Normocephalic  EYES: eyes closed  EARS: Normal external canals.  NOSE: Normal without discharge.  LUNGS: CTAB, non labored breaths, SpO2 >96% on RA.   HEART: Regular rhythm. Normal S1/S2. No murmurs. Normal pulses.  ABDOMEN: Soft, flat, non-distended.  NEUROLOGIC: sleeping, shifted a few times     Data   Recent Labs   Lab 11/10/19  0548 11/10/19  0222 11/09/19  2143 11/09/19  0630 11/08/19  0951   WBC  --  10.9  --  9.5 3.6*   HGB  --  9.8*  --  12.6* 9.4*   MCV  --  84  --  81 80   PLT  --   254  --  383 391   INR  --  1.82*  --   --   --     135 137 138 140   POTASSIUM 3.9 4.0 3.9 4.2 3.2*   CHLORIDE 103 104 108 106 109   CO2 26 23 20 25 23   BUN 22 22 21 17 11   CR 0.79 0.88 0.82 0.70 0.75   ANIONGAP 6 8 9 7 8   MICHAEL 6.7* 6.5* 6.2* 7.5* 7.8*   GLC 93 104* 140* 114* 115*   ALBUMIN 2.5* 2.3* 2.0* 2.8* 3.2*   PROTTOTAL  --  3.8*  --   --  5.1*   BILITOTAL  --  0.5  --   --  0.6   ALKPHOS  --  72  --   --  81   ALT  --  58  --   --  61   AST  --  40  --   --  32   LIPASE  --   --   --  4,112* 2,560*     Recent Results (from the past 24 hour(s))   XR Chest Port 1 View    Narrative    EXAMINATION:  XR CHEST PORT 1 VW 11/10/2019 2:49 AM.    COMPARISON: 10/3/2019.    HISTORY:  new onset chest pain, hypotension, pancreatitis    FINDINGS: Portable view of the chest. Right IJ Port-A-Cath tip  projects over the high right atrium. The trachea is midline. The  cardiomediastinal silhouette is stable. Low lung volumes. Pulmonary  vascular congestion and small right pleural effusion. No pneumothorax.  The upper abdomen is unremarkable.      Impression    IMPRESSION: Low lung volumes with small right effusion and hazy  atelectasis/edema.    I have personally reviewed the examination and initial interpretation  and I agree with the findings.    SHENA ORTIZ MD   XR Chest Port 1 View    Narrative    Exam: XR CHEST PORT 1 VW  11/10/2019 9:07 AM      History: follow up pulmonary edema    Comparison: Same-day. 10/1/2019.    Findings: Right port is over the high right atrium. Lung volumes are  low and decreased from the prior. Increased pleural effusions, right  greater left, with increased hazy opacities, most pronounced in the  lung bases. Cardiac silhouette and mediastinal silhouettes are similar  in size. Upper abdomen is unchanged. Tubular area of attenuation in  the base of the neck.      Impression    Impression:  1. Low volumes with increased asymmetric effusions and bibasilar  atelectasis/edema.  2. Tubular  area of high attenuation in the base of neck may represent  calcified internal jugular vein given thrombosis on prior CT.    SHENA ORTIZ MD   US Abdomen Complete    Narrative    Exam: Complete abdominal ultrasound.     History: aspariginase associated pancreatitis, please assess for fluid  collection, cyst, necrosis. Please assess kidneys as well    Comparison: 11/7/2019    Findings: The liver remains echogenic and measures 17.7 cm.  No  intrahepatic mass or biliary dilatation. Gallbladder is well distended  and normal in appearance. No gallstones. Common bile duct measures 2  mm.    There is poor visualization of the pancreas with increased attenuation  in the left upper quadrant. No fluid collection within the region of  the pancreas is appreciated. Visualized portions of the aorta and IVC  are within normal limits. New pleural effusions and small amount of  ascites.    The spleen is normal in size at 11.2 cm. The kidneys are normal in  echogenicity and echotexture. No hydronephrosis. The right kidney  measures 12.2 cm and the left kidney measures 11.9 cm.       Impression    Impression:    1. Poor definition of the pancreas and left upper quadrant, likely  related to known pancreatitis. No identified fluid collection.  2. New pleural effusions and small amount of ascites.  3. Hepatic steatosis.    SHENA ORTIZ MD   Echo Pediatric (TTE) Complete    Narrative    [id number]  [id number]  [id number]  [id number]^JULIUS^MARC^KAHLIL                                                                   Study ID: [id number]                                                 [facility] [address]                                                Phone: [phone]                                Pediatric  Echocardiogram  _____________________________________________________________________________  __     Name: PLACIDO RODRIGUEZ  Study Date: 11/10/2019 09:49 AM             Patient Location: [facility]  MRN: [id number]                             Age: 19 yrs  : 2000                             BP: 102/62 mmHg  Gender: Male  Patient Class: Inpatient                    Height: 182 cm  Ordering Provider: MARC MADRID             Weight: 83 kg  Referring Provider: FAUSTO OROPEZA     BSA: 2.0 m2  Performed By: Jones Peacock RDCS  Report approved by: Gumaro Arzola MD  Reason For Study: Other, Please Specify in Comments  _____________________________________________________________________________  __     ------CONCLUSIONS------  Normal echocardiogram. There is normal appearance and motion of the tricuspid,  mitral, pulmonary and aortic valves. No atrial, ventricular or arterial level  shunting. The left and right ventricles have normal chamber size, wall  thickness, and systolic function. The calculated single plane left ventricular  ejection fraction from the 4 chamber view is 62 %.  _____________________________________________________________________________  __        Technical information:  A complete two dimensional, MMODE, spectral and color Doppler transthoracic  echocardiogram is performed. The study quality is adequate. Images are  obtained from parasternal, apical, subcostal and suprasternal notch views.  Images were obtained from the parasternal, apical and suprasternal notch  views. ECG tracing shows regular rhythm.     Segmental Anatomy:  There is normal atrial arrangement, with concordant atrioventricular and  ventriculoarterial connections.     Systemic and pulmonary veins:  Normal coronary sinus. Color flow demonstrates flow from two pulmonary veins  entering the left atrium.     Atria and atrial septum:  Normal right atrial size. The left atrium is normal in size. There is no  atrial level shunting.         Atrioventricular valves:  The tricuspid valve is normal in appearance and motion. Trivial tricuspid  valve insufficiency. The mitral valve is normal in appearance and motion.  There is no mitral valve insufficiency.     Ventricles and Ventricular Septum:  The left and right ventricles have normal chamber size, wall thickness, and  systolic function. The calculated single plane left ventricular ejection  fraction from the 4 chamber view is 62 %. There is no ventricular level  shunting.     Outflow tracts:  Normal great artery relationship. There is unobstructed flow through the right  ventricular outflow tract. The pulmonary valve motion is normal. There is  normal flow across the pulmonary valve. Trivial pulmonary valve insufficiency.  There is unobstructed flow through the left ventricular outflow tract.  Tricuspid aortic valve with normal appearance and motion. There is normal flow  across the aortic valve.     Great arteries:  The main pulmonary artery has normal appearance. There is unobstructed flow in  the main pulmonary artery. The pulmonary artery bifurcation is normal. There  is unobstructed flow in both branch pulmonary arteries. Normal ascending  aorta. The aortic arch appears normal. There is unobstructed antegrade flow in  the ascending, transverse arch, descending thoracic and abdominal aorta.     Arterial Shunts:  There is no arterial level shunting.     Coronaries:  Normal origin of the right and left proximal coronary arteries from the  corresponding sinus of Valsalva by 2D. There is normal flow pattern in the  left and right coronaries by color Doppler.        Effusions, catheters, cannulas and leads:  No pericardial effusion.     MMode/2D Measurements & Calculations  LA dimension: 3.3 cm                       Ao root diam: 3.4 cm  LA/Ao: 0.97                                4 Chamber EF: 62.2 %  LVMI(BSA): 89.3 grams/m2                   LVMI(Height): 36.5  RWT(MM): 0.51        Doppler  Measurements & Calculations  PA V2 max: 136.1 cm/sec               TR max marialuisa: 278.0 cm/sec  PA max P.4 mmHg                   TR max P.9 mmHg     desc Ao max marialuisa: 154.7 cm/sec  desc Ao max P.6 mmHg     [place] 2D Z-SCORE VALUES  Measurement NameValue Z-ScorePredictedNormal Range  LVLd apical(4ch)8.6 cm  LVLs apical(4ch)7.4 cm     [place] Z-Scores (Measurements & Calculations)  Measurement NameValue      Z-ScorePredictedNormal Range  IVSd(MM)        1.2 cm     1.2    1.0      0.72 - 1.35  IVSs(MM)        1.5 cm     0.68   1.4      1.0 - 1.8  LVIDd(MM)       4.4 cm     -2.3   5.3      4.5 - 6.1  LVIDs(MM)       2.2 cm     -3.1   3.4      2.7 - 4.2  LVPWd(MM)       1.1 cm     1.0    0.97     0.71 - 1.24  LVPWs(MM)       2.2 cm     2.9    1.6      1.2 - 2.0  LV mass(C)d(MM) 183.6 grams-0.47  201.8    136.1 - 299.3  FS(MM)          49.4 %     3.4    34.9     28.5 - 42.7           Report approved by: Jocy Ewing 11/10/2019 01:14 PM        Medications     lactated ringers 150 mL/hr at 11/10/19 0633     morphine       norepinephrine 0.05 mcg/kg/min (11/10/19 1000)       acetaminophen  650 mg Oral Q6H     enoxaparin ANTICOAGULANT  60 mg Subcutaneous Q12H     melatonin  3 mg Oral At Bedtime     pantoprazole (PROTONIX) IV  40 mg Intravenous Daily with breakfast     prochlorperazine  10 mg Intravenous Q6H     scopolamine  1 patch Transdermal Q72H     scopolamine   Transdermal Q8H     [START ON 2019] scopolamine   Transdermal Q72H     sodium chloride (PF)  3 mL Intracatheter Q8H     [START ON 2019] sulfamethoxazole-trimethoprim  1 tablet Oral Q Mon Tu BID     Vitamin D3  2,000 Units Oral Daily

## 2019-11-10 NOTE — PROGRESS NOTES
"Pediatric Hematology Oncology Cross Cover Note    19:43: patient weighed and noted to be 80.1kg, an increase of 6.6kg from admission weight. Given low urine output over the course of the day, after discussion with H/O fellow he was given 20mg IV lasix with result of 175ml eyal colored urine. Otherwise well at this time, pain managed, BP adequate    23:15 Pt noted to have softer blood pressures upper 80s/50s, patient was intact and alert with good pulses but tachycardic, 1L LR bolus given with improvement in pressures to 90s-100/50-60s with sustained tachycardia. Lungs clear, normal WOB, no crackles appreciated.    01:40 BP noted to be slowly down trending to upper 70-80s/40-50s, patient intermittently desating to the upper 80s with mild increase in WOB. Tachycardic, pulses intact but softer in LE. Able to be awakened but tired. Patient complaining of bilateral chest pain with deep inspiration - states it has been present x1 week   -PCA continuous rate decreased to 0.5mg/hr   -Oxygen applied with increase in saturations to 100%   -250ml 5% albumin bolus given - blood pressures increased to low 100/50s   -Rapid response called, PICU fellow at bedside   -50ml (12.5g) 25% albumin given to increase oncotic pressure   -Labs collected, significant for elevated lactate to 2.8, creatinine increase to 0.88, elevated INR and PTT in the setting of lovenox  therapy, D-dimer elevated to 4.5 in the setting of known DVTs. HG  stable at 9.8 - decrease likely dilutional. VBG reassuring. Albumin low at 2.3   -CXR - demonstrated increased pulmonary congestion suggestive of mild fluid overload    /62   Pulse 117   Temp 99.1  F (37.3  C) (Oral)   Resp 18   Ht 1.82 m (5' 11.65\")   Wt 80.1 kg (176 lb 9.4 oz)   SpO2 97%   BMI 24.18 kg/m      Assessment: Fluid responsive hypotension with compensatory tachycardia in the setting of pancreatitis. Picture most consistent with systemic inflammatory response 2/2 pancreatitis, no " indication of bacterial infection at this time, will monitor closely. No indication of PE at this time although this is a potential side effect of Pegaspargiginase and should be considered if symptoms progress.    PLAN:   -Stable to remain on floor at this time   -Monitor blood pressures Q15 minutes    -Plan for 1L LR bolus + 10mg IV lasix if pressures trend down again goal SBP >90   -Monitor respiratory status closely, keep O2 sats >90%   -No indication for pressors at this time although will monitor fluid status closely to balance BP with fluid overload   -No indication of PE at this time, will monitor closely   -No clear indication of Sepsis at this time, will monitor closely, no antibiotics at this time    Plan discussed with Dr. Cruz PICU fellow, Dr. Mclain PICU attending, Dr. Herminia Bloom/Onc fellow, Charge nurse    Marely Godinez MD  PL3 - Pediatrics  [facility]  pager [phone]

## 2019-11-10 NOTE — PROVIDER NOTIFICATION
11/09/19 2310 11/09/19 2320   Vitals   BP (!) 81/44 (!) 79/47     Provider notified of decreased blood pressure below parameters. Pulses strong and cap refill <2secs, but pressures persistently low with multiple rechecks. Plan to give 1L LR bolus and continue to monitor BPs [id number]. Will notify team with any changes.

## 2019-11-10 NOTE — PROVIDER NOTIFICATION
11/10/19 0207 11/10/19 0210 11/10/19 0220   Vitals   BP (!) 81/43 (!) 78/48 (!) 75/52     Persistently hypotensive approximately 2 hours after 1L fluid bolus. MD notified and at bedside to assess. At time of assessment, patient desaturated into high 80s. Oxymask with O2 applied at 5.5LPM to maintain 100% oxygenation. RRT called due to persistent hypotension, fluid status, tachycardia, and tachypnea. 25% and 5% Albumen given at max rate with fair result- BPs 100-110/60s. Plan to continue to get vitals [id number] and escalate if patient decompensates further.

## 2019-11-10 NOTE — SIGNIFICANT EVENT
Pediatric Rapid Response Note    SITUATION  November 10, 2019  Estimated time of call: ~2:00 am  Arrival time at bedside: ~2:02 am  Location of call: Unit 5  Team called by: Physician    Primary Reason for Call  Reported hypotension    BACKGROUND  Admitting Diagnosis: pancreatitis 2/2 PEG-asparaginase  Patient history prior to RRT being called:    Patient in ED 24 hours prior to RRT being called? no    Patient previously transferred from PICU to floor? No (not this admission)    Patient transferred from PACU? no    Patient received procedural sedation or general anesthesia within 24 hours of RRT being called? no    Interventions this admission: IVF, PCA morphine, antiemetics, lasix  Pertinent past medical history: T-cell lymphoblastic Lymphoma    Current Facility-Administered Medications   Medication     acetaminophen (TYLENOL) tablet 650 mg     albumin human 25 % injection 12.5 g     albumin human 25 % injection     albumin human 5 % injection 250 mL     albumin human 5 % injection     diphenhydrAMINE (BENADRYL) injection 25-50 mg    Or     diphenhydrAMINE (BENADRYL) capsule 25-50 mg     enoxaparin ANTICOAGULANT (LOVENOX) injection 60 mg     lactated ringers infusion     melatonin tablet 3 mg     morphine  PCA 1 mg/mL OPIOID TOLERANT     naloxone (NARCAN) injection 0.1-0.4 mg     naloxone (NARCAN) injection 0.1-0.4 mg     ondansetron (ZOFRAN) injection 8 mg    Or     ondansetron (ZOFRAN-ODT) ODT tab 8 mg     pantoprazole (PROTONIX) 40 mg IV push injection     polyethylene glycol (MIRALAX/GLYCOLAX) Packet 17 g     prochlorperazine (COMPAZINE) injection 10 mg     scopolamine (TRANSDERM) 72 hr patch 1 patch     scopolamine (TRANSDERM-SCOP) Patch in Place     scopolamine (TRANSDERM-SCOP) patch REMOVAL     sennosides (SENOKOT) tablet 2 tablet     sodium chloride (PF) 0.9% PF flush 3 mL     sodium chloride 0.9 % infusion     [START ON 11/11/2019] sulfamethoxazole-trimethoprim (BACTRIM DS/SEPTRA DS) 800-160 MG per tablet  1 tablet     Vitamin D3 (CHOLECALCIFEROL) 25 mcg (1000 units) tablet 2,000 Units       ASSESSMENT  Pulse  Av.8  Min: 117  Max: 141  BP - Mean:  [54-83] 74  Systolic (24hrs), Av , Min:75 , Max:117     Diastolic (24hrs), Av, Min:39, Max:77    Resp  Av.6  Min: 13  Max: 25  SpO2  Av.7 %  Min: 94 %  Max: 100 %    176 lbs 9.42 oz    I/O:  I/O last 3 completed shifts:  In: 4729.17 [I.V.:2729.17; IV Piggyback:]  Out: 700 [Urine:700]  I/O this shift:  In: 1740 [I.V.:1740]  Out: 175 [Urine:175]    Key physical exam findings: AOx4; no obvious pitting edema; tachycardic (120's), regular rhythm, faint systolic murmur at LSB; mild diminished breath sounds RLL compared to L, no obvious crackles/rhonchi/wheezes; abdomen soft, no hepatosplenomegaly; cap refill ~2 sec, distal pulses 2+ b/l; normal color, warm/well perfused    SUMMARY IMPRESSION: 18 yo w/ pancreatitis 2/2 recent PEG-asparaginase now w/ related hypotension which does not on exam appear to have obvious end organ involvement.  Previously has shown intermittent response to boluses (x3 over last 24 hrs) and increased maintenance rate (150 ml/hr).  Concern for capillary leak (also hypoalbuminemic).  No fevers to suggest infectious source at this time.  Mild desaturation at this event (SpO2 80's) requiring low oxymask (5L), may be 2/2 developing pulmonary edema.    RECOMMENDATIONS    Respiratory interventions:     CXR, VBG, wean Oxymask PRN for SpO2 > 90    Consider diuretics to lalit IVF (portable CXR appears to have some developing edema R>L)    Cardio-hemodynamic interventions:    Check lactate and SvO2    5% albumin bolus (250 mL) now --- improvement in hypotension to 110's systolic, sustained    25% albumin once -- attempt to improve oncotic pressure    Isotonic IVF boluses PRN for SBP goal > 90 --- if developing worsening respiratory status will have low threshold for inotropic support    Continue q15min BP's for now    Neuro  interventions:    No interventions    Other systems: check CBC, inflammatory markers, coags, LFT's for other markers of end organ damage; regarding ROS chest pain, will defer threshold for embolism scan to oncology team, as well as further pancreatic imaging if needed at that time    Medications ordered: above albumin  Labs ordered: above CBC, CMP, coags, CRP, procalcitonin, lactate, VBG w/ SvO2    COMMUNICATION:  Primary team update with plan? yes  ICU team updated with plan? yes  Family updated with plan? yes    DISPOSITION  Stabilized and continue to follow on the floor    Time RRT completed: ~2:45 am    RRT discussed w/ attending physician Dr. Mclain.    Sj Cruz MD  PICU Fellow PGY5

## 2019-11-10 NOTE — PROGRESS NOTES
Responded to Rapid Response call. RRT was called due to brief desaturation episode and pt decline. Patient was on 5.5L oxymask, 32-40 min, 100. He had decreased lung sounds on the right lung upper and lower lobes. Pt also complained of center chest pain earlier this evening. He responded adequately to questions and was not short of breath. O2 was titrated to 3L sats remained unchanged.  Respiratory will continue to follow.      El Black, RT, RT  11/10/2019 2:46 AM

## 2019-11-10 NOTE — SIGNIFICANT EVENT
Pediatric Rapid Response Note    SITUATION  November 10, 2019  Estimated time of call: 0700  Arrival time at bedside: 0702  Location of call: Unit 5  Team called by: Physician    Primary Reason for Call  Reported hypotension    BACKGROUND  Admitting Diagnosis: Pancreatitis  Patient history prior to RRT being called:    Patient in ED 24 hours prior to RRT being called? no    Patient previously transferred from PICU to floor? no    Patient transferred from PACU? no    Patient received procedural sedation or general anesthesia within 24 hours of RRT being called? 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  DVTs of the bilateral UE/SVC syndrome.    Current Facility-Administered Medications   Medication     acetaminophen (TYLENOL) tablet 650 mg     albumin human 25 % injection     albumin human 5 % injection 250 mL     albumin human 5 % injection     diphenhydrAMINE (BENADRYL) injection 25-50 mg    Or     diphenhydrAMINE (BENADRYL) capsule 25-50 mg     enoxaparin ANTICOAGULANT (LOVENOX) injection 60 mg     lactated ringers infusion     melatonin tablet 3 mg     morphine  PCA 1 mg/mL OPIOID TOLERANT     naloxone (NARCAN) injection 0.1-0.4 mg     naloxone (NARCAN) injection 0.1-0.4 mg     ondansetron (ZOFRAN) injection 8 mg    Or     ondansetron (ZOFRAN-ODT) ODT tab 8 mg     pantoprazole (PROTONIX) 40 mg IV push injection     polyethylene glycol (MIRALAX/GLYCOLAX) Packet 17 g     prochlorperazine (COMPAZINE) injection 10 mg     scopolamine (TRANSDERM) 72 hr patch 1 patch     scopolamine (TRANSDERM-SCOP) Patch in Place     scopolamine (TRANSDERM-SCOP) patch REMOVAL     sennosides (SENOKOT) tablet 2 tablet     sodium chloride (PF) 0.9% PF flush 3 mL     sodium chloride 0.9 % infusion     [START ON 11/11/2019] sulfamethoxazole-trimethoprim (BACTRIM DS/SEPTRA DS) 800-160 MG per tablet 1 tablet     Vitamin D3 (CHOLECALCIFEROL) 25 mcg (1000 units) tablet 2,000 Units        ASSESSMENT  Pulse  Av.2  Min: 117  Max: 141  BP - Mean:  [54-78] 69  Systolic (24hrs), Av , Min:75 , Max:117     Diastolic (24hrs), Av, Min:39, Max:68    Resp  Av.5  Min: 13  Max: 24  SpO2  Av.8 %  Min: 82 %  Max: 100 %    176 lbs 9.42 oz    I/O:  I/O last 3 completed shifts:  In: 5856.67 [I.V.:3856.67; IV Piggyback:]  Out: 525 [Urine:525]  I/O this shift:  In: 250   Out: -     Key physical exam findings: Patient in no acute distress, able to converse comfortably in full sentences.  Lung sounds diminished at the right base, no adventitious sounds.  Tachycardic, normal S1/S2, no gallop or rub.  Mild abdominal tenderness particularly in the RLQ, no HSM.  Brisk peripheral pulses, extremities WWP.  No appreciable extremity edema.    SUMMARY IMPRESSION: 20yo male with hx of T-cell lymphoma, most recently admitted on  for pancreatitis secondary to PEG-Asparginase.  Has had persistent hypotension which has been volume response, though has required substantial fluid resuscitation in the last 24 hours.  RRT called for recurrent hypotension and concern for need vasopressors.  Exam as above, blood pressures improved while receiving bolus of 5% albumin.  On review of patient information, has been progressively olgiuric over last ~10 hours, CXR from ~0230 showing progressive pulmonary edema/effusion.  Given concern for potential respiratory decompensation, along with oliguria and approximately 8kg increase from admission, plan to bring patient down for initiation of vasopressors and closer monitoring.  Discussed with primary team    RECOMMENDATIONS    Respiratory interventions:     Stable on RA, potential for CPAP    Cardio-hemodynamic interventions:    Start norepinephrine gtt    Neuro interventions:    No interventions    Other systems:     Medications ordered: None  Labs ordered: None    COMMUNICATION:  Primary team update with plan? yes  ICU team updated with plan? yes  Family updated  with plan? yes    DISPOSITION  Transfer to PICU    Time RRT completed: 1372  Beck Niño MD

## 2019-11-10 NOTE — PLAN OF CARE
[id number]. Hypotensive, tachycardic (120-140s), and tachypneic (RR 20-28). Progressive hypotension led to 1L LR bolus with good response for approximately 2 hours. @ 0200, BP found to be critically low 70-80s/40s, see provider notification. RRT called and albumin administered at max rate. Chest X-ray showed pleural effusions and venous congestion. STAT labs showed elevated lactic acid, CRP, and D-Dimer. Plan to continue to monitor closely with [id number] vitals until certain patient is vitally stable.  Denies nausea or vomiting. Drinking fluids well. Lasix x1 with poor response- only 175mL of eyal urine out this entire 12-hour shift. Dad and step-mom at bedside and attentive. Mom called with updates on status. Hourly rounding completed.    As of 0652 this morning, pressures downtrending gradually with BP 92/48, MD aware. 5% 250 mL Albumin started and RRT called. After assessment of fluid status and lack of sustained response to albumen or fluids, decision to transfer to PICU for higher level of care.

## 2019-11-10 NOTE — PROGRESS NOTES
[facility]    Progress Note - PICU       Date of Admission:  11/7/2019  Date of Service: 11/10/19    Assessment & Plan   Lazaro Lund is a 19 year old male admitted on 11/07/19 for intractable nausea, abdominal pain, severe dehydration, and elevated lipase and amylase consistent with PEG asparaginase-associated pancreatitis. Overnight he was hypotensive with poor urine output, while continuing to be significantly volume overloaded. He was transferred to the PICU for concern for distributive shock and need for fluid resuscitation, pressors, and close monitoring.     Today's changes:  -Echo- showed appropriate R and L chamber function  -CPAP trial  -Stop zofran due to prolonged QTc  -Start ativan PRN for nausea  -CT of abdomen to assess for pancreatic necrosis  -Lovenox increased per heme  -Start cefepime and metronidazole due to fever    FEN  Dehydration  -LR @ 1.5 MIVF, may decrease later depending on echo results  -NPO  -Stict I/Os  -PTA vitamin D  -Consider drip feed ins AM 11/11 per GI    RESP  -May need CPAP overnight, can start at PEEP 8    CV  -Did not get CVPs today  -Will continue to monitor BPs  -Echo today showed normal function, no sign of PE     GI  Asparaginase associated pancreatitis - Lipase >3x ULN, significant upper abdominal pain and intractable vomiting.  - IV protonix 40mg daily   - NPO   - GI consulting appreciate recs  - Morphine PCA for pain management  - CT abdomen with IV contrast today 11/10  - Repeat CMP, lipase, amylase tomorrow 11/11 per heme    H/O constipation  -Miralax daily PRN  -Senokot daily PRN    HEME/ONC  T cell lymphoblastic lymphoma   - Cycle delayed until at least 11/14 per heme notes   - Heme onc consulted   - CBC daily     DVT of the bilateral upper extremities   - Increase lovenox 60 mg --> 70 mg Q12 per heme   - Lovenox level tonight at 0000 on 11/11     PCP ppx   - Bactrim QM/T (may need to hold next week depending on persistent of  nausea and vomiting)     Nausea  -Discontinued zofran due to prolonged QTc  -Compazine q6h scheduled  -Start ativan 2 mg PRN  -Benadryl PRN  -Scopolamine patch in place    At risk for chemotherapy induced cytopenias  - Maintain hgb > 7  - Maintain plt count > 30 d/t lovenox therapy    ID:  Fever  Concern for necrotizing pancreatitis  -Start cefepime 2 g q8h on 11/10  -Start metronidazole 500 mg q8h on 11/10     NEURO:  Abdominal pain 2/2 pancreatitis  -Morphine PCA  -Tylenol PRN   -PTA Melatonin     DVT Prophylaxis: Enoxaparin (Lovenox)  Code Status: Full    Disposition Plan    > 7 days given fluid resuscitation, pain control, appropriate urine output  The patient's care was discussed with the attending Dr. Lay.     Lew Patterson MD  [facility] Pediatrics PGY-2    Pediatric Critical Care Progress Note:    Lazaro Lund remains critically ill with chemotherapy induced pancreatitis with associated distributive shock - now resolving.  Due to volume resuscitation, hypoxic respiratory has evolved requiring positive pressure support.    I personally examined and evaluated the patient today. All physician orders and treatments were placed at my direction.  Formulated plan with the house staff team or resident(s) and agree with the findings and plan in this note.  I have evaluated all laboratory values and imaging studies from the past 24 hours.  Consults ongoing and ordered are Gastroenterology and Oncology  I personally managed the respiratory and hemodynamic support, metabolic abnormalities, nutritional status, antimicrobial therapy, and pain/sedation management.   Key decisions made today included obtain an Abd CT scan to detail the pancreatic injury, positive pressure respiratory support and monitor fluid status incase diuresis is needed.  Procedures that will happen in the ICU today are: none  The above plans and care have been discussed with mother and all questions and concerns were addressed.  I spent a total of 45  minutes providing critical care services at the bedside, and on the critical care unit, evaluating the patient, directing care and reviewing laboratory values and radiologic reports for Lazaro Lund.    Sherrie Lay MD      Interval History      Required LR bolus at 2323 yesterday evening due to lower BPs of /50s. Also given a dose of Lasix and albumin. Blood pressures continued to be low with oliguria. Lazaro was transferred to the ICU around 0730. He was started on norepinephrine with appropriate increase in blood pressures. Urine output has increased slightly since starting norepi. This afternoon he developed a fever around 1600, had cultures drawn, and was started on antibiotics. He will also be getting a CT abdomen.    Data reviewed today: I reviewed all medications, new labs and imaging results over the last 24 hours.     Physical Exam   Vital Signs: Temp: 98.6  F (37  C) Temp src: Oral BP: 108/63 Pulse: 118 Heart Rate: 122 Resp: 19 SpO2: 97 % O2 Device: None (Room air) Oxygen Delivery: 5 LPM  Weight: 182 lbs 15.71 oz    EXAM:  GENERAL: lying in bed, in significant pain, distressed  SKIN: Clear. No significant rash, abnormal pigmentation or lesions  HEAD: Normocephalic  EYES: EOMI, no injection  NOSE: Normal without discharge.  LUNGS: Right side with diminished breath sounds over lower to mid lung field. Left side with better air movement but diminished at the base. No increased work of breathing.   HEART: Regular rhythm. Normal S1/S2. No murmurs. Pulses are hyperdynamic.  ABDOMEN: Soft, flat, non-distended. Pain in epigastric region with very mild palpation.  NEUROLOGIC: No focal findings.    Data   Recent Labs   Lab 11/10/19  0548 11/10/19  0222 11/09/19  2143 11/09/19  0630 11/08/19  0951   WBC  --  10.9  --  9.5 3.6*   HGB  --  9.8*  --  12.6* 9.4*   MCV  --  84  --  81 80   PLT  --  254  --  383 391   INR  --  1.82*  --   --   --     135 137 138 140   POTASSIUM 3.9 4.0 3.9 4.2 3.2*    CHLORIDE 103 104 108 106 109   CO2 26 23 20 25 23   BUN 22 22 21 17 11   CR 0.79 0.88 0.82 0.70 0.75   ANIONGAP 6 8 9 7 8   MICHAEL 6.7* 6.5* 6.2* 7.5* 7.8*   GLC 93 104* 140* 114* 115*   ALBUMIN 2.5* 2.3* 2.0* 2.8* 3.2*   PROTTOTAL  --  3.8*  --   --  5.1*   BILITOTAL  --  0.5  --   --  0.6   ALKPHOS  --  72  --   --  81   ALT  --  58  --   --  61   AST  --  40  --   --  32   LIPASE  --   --   --  4,112* 2,560*     Recent Results (from the past 24 hour(s))   XR Chest Port 1 View    Narrative    EXAMINATION:  XR CHEST PORT 1 VW 11/10/2019 2:49 AM.    COMPARISON: 10/3/2019.    HISTORY:  new onset chest pain, hypotension, pancreatitis    FINDINGS: Portable view of the chest. Right IJ Port-A-Cath tip  projects over the high right atrium. The trachea is midline. The  cardiomediastinal silhouette is stable. Low lung volumes. Pulmonary  vascular congestion and small right pleural effusion. No pneumothorax.  The upper abdomen is unremarkable.      Impression    IMPRESSION: Low lung volumes with small right effusion and hazy  atelectasis/edema.    I have personally reviewed the examination and initial interpretation  and I agree with the findings.    SHENA ORTIZ MD   XR Chest Port 1 View    Narrative    Exam: XR CHEST PORT 1 VW  11/10/2019 9:07 AM      History: follow up pulmonary edema    Comparison: Same-day. 10/1/2019.    Findings: Right port is over the high right atrium. Lung volumes are  low and decreased from the prior. Increased pleural effusions, right  greater left, with increased hazy opacities, most pronounced in the  lung bases. Cardiac silhouette and mediastinal silhouettes are similar  in size. Upper abdomen is unchanged. Tubular area of attenuation in  the base of the neck.      Impression    Impression:  1. Low volumes with increased asymmetric effusions and bibasilar  atelectasis/edema.  2. Tubular area of high attenuation in the base of neck may represent  calcified internal jugular vein given  thrombosis on prior CT.    SHENA ORTIZ MD     Medications     lactated ringers 150 mL/hr at 11/10/19 0633     morphine       norepinephrine         acetaminophen  650 mg Oral Q6H     enoxaparin ANTICOAGULANT  60 mg Subcutaneous Q12H     melatonin  3 mg Oral At Bedtime     pantoprazole (PROTONIX) IV  40 mg Intravenous Daily with breakfast     prochlorperazine  10 mg Intravenous Q6H     scopolamine  1 patch Transdermal Q72H     scopolamine   Transdermal Q8H     scopolamine   Transdermal Q72H     sodium chloride (PF)  3 mL Intracatheter Q8H     [START ON 11/11/2019] sulfamethoxazole-trimethoprim  1 tablet Oral Q Mon Tues BID     Vitamin D3  2,000 Units Oral Daily

## 2019-11-10 NOTE — PROVIDER NOTIFICATION
This note also relates to the following rows which could not be included:  Pulse - Cannot attach notes to unvalidated device data       11/10/19 0200   Vitals   Temp 99.1  F (37.3  C)   Temp src Oral   Heart Rate 128   BP (!) 81/39  (MD notified)   BP - Mean 54   Patient Position Lying   Site Arm, upper left   Mode Electronic   Cuff Size Adult   Resp 18   Activity During Vital Signs Calm     MD immediately notified of persistent hypotension and at bedside to assess. Per MD, PICU fellow to come assess and see if additional interventions needed.

## 2019-11-10 NOTE — PROGRESS NOTES
[facility]  Pediatric Gastroenterology Follow-up Consultation Note    Date of Service (when I saw the patient): 11/10/2019     Assessment & Plan   Lazaro Lund is a 19 year old male who was admitted on 11/7/2019 for intractable nausea, abdominal pain and severe dehydration. 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  He has developed asparaginase-associated pancreatitis (AAP) with significant abdominal pain and nausea.  He has continued on a PCA for pain management but has been limited in oral intake due to nausea.  UOP has decreased necessitating aggressive fluid resuscitation; however given hypoalbuminemia, he has been third spacing.  Noted hypotension overnight and mild desats requiring supplemental O2 concerning for pulmonary edema.  Labs notable for elevated lactate, increased creatinine, hypoalbuminemia.    Recommendations--  -Persistent (>48 h) SIRS is associated with multi-organ failure and mortality in acute pancreatitis.  -Continue fluid resuscitation with LR to decrease SIRS response and to aim for at least 0.5-1 mL/kg/hr in UOP.  -Low threshold to obtain advanced cross-sectional imaging; pancreatic/peripancreatic necrosis may only become apparent after 72hrs of symptoms.  -Continue pain management.  -If poor oral intake or status does not allow oral intake, consider placement of NG/NJ and start enteral feeds as meta-analyses demonstrate that early enteral feeding (as opposed to PN) will decrease infections, multi-organ failure, need for surgical intervention, and mortality.  -No current indication for antibiotics; can reconsider if fevers, other change in clinical status, or concern for infected pancreatic necrosis.    We will continue to follow along with you.  Please do not hesitate to contact us with any additional questions or concerns.    Shannon PENALOZA  MD Jeff MPH    Pediatric Gastroenterology, Hepatology, and Nutrition  [facility]      Interval History   Low UOP over course of day, increase of 6.6kg from admission weight in evening of 11/9 (although admission weight down from previous). Given 20mg IV lasix, with output 175mL eyal urine.  Softer BPs noted late evening; given 1L LR.  Softer BPs and mild increased WOB overnight.  Bilateral chest pain with deep inspiration.  O2 sats 80s%.  Needed supplemental O2, given 250mL 5% albumin and 50mL 25% albumin. RRT called.  Labs obtained. CXR with pulmonary congestion.  Given another 250mL 5% albumin.  Transferred to PICU early this AM.  Norechrista at bedside.  Continues on morphine PCA.    Grandma currently at bedside.    Physical Exam   Temp: 100.2  F (37.9  C) Temp src: Axillary BP: 104/66 Pulse: 117 Heart Rate: 118 Resp: 21 SpO2: 97 % O2 Device: None (Room air) Oxygen Delivery: 5 LPM  Vitals:    11/07/19 1600 11/09/19 1943 11/10/19 0800   Weight: 73.5 kg (162 lb 0.6 oz) 80.1 kg (176 lb 9.4 oz) 83 kg (182 lb 15.7 oz)     Vital Signs with Ranges  Temp:  [98.2  F (36.8  C)-100.2  F (37.9  C)] 100.2  F (37.9  C)  Pulse:  [113-136] 117  Heart Rate:  [112-135] 118  Resp:  [13-29] 21  BP: ()/(39-68) 104/66  SpO2:  [82 %-100 %] 97 %  I/O last 3 completed shifts:  In: 2156.67 [I.V.:9876.67; IV Piggyback:2000]  Out: 525 [Urine:525]    General: sleeping comfortably and opens eyes briefly with exam  HEENT: normocephalic, atraumatic; nares clear; moist mucous membranes  CV: tachycardic, regular rhythm, no murmurs  Resp: lungs clear to auscultation bilaterally, normal respiratory effort on room air, maintaining O2 sats at 98% without supplemental O2 currently  Abd: soft, mildly tender to light palpation, non-distended  Neuro: sleeping comfortably, mild flinching with abdominal exam    Medications     lactated ringers 150 mL/hr at 11/10/19 0633     morphine        norepinephrine 0.05 mcg/kg/min (11/10/19 1000)       acetaminophen  650 mg Oral Q6H     enoxaparin ANTICOAGULANT  60 mg Subcutaneous Q12H     melatonin  3 mg Oral At Bedtime     pantoprazole (PROTONIX) IV  40 mg Intravenous Daily with breakfast     prochlorperazine  10 mg Intravenous Q6H     scopolamine  1 patch Transdermal Q72H     scopolamine   Transdermal Q8H     [START ON 11/12/2019] scopolamine   Transdermal Q72H     sodium chloride (PF)  3 mL Intracatheter Q8H     [START ON 11/11/2019] sulfamethoxazole-trimethoprim  1 tablet Oral Q Mon Tues BID     Vitamin D3  2,000 Units Oral Daily       Data   Results for orders placed or performed during the hospital encounter of 11/07/19 (from the past 24 hour(s))   Basic metabolic panel   Result Value Ref Range    Sodium 137 133 - 144 mmol/L    Potassium 3.9 3.4 - 5.3 mmol/L    Chloride 108 94 - 109 mmol/L    Carbon Dioxide 20 20 - 32 mmol/L    Anion Gap 9 3 - 14 mmol/L    Glucose 140 (H) 70 - 99 mg/dL    Urea Nitrogen 21 7 - 30 mg/dL    Creatinine 0.82 0.66 - 1.25 mg/dL    GFR Estimate >90 >60 mL/min/[1.73_m2]    GFR Estimate If Black >90 >60 mL/min/[1.73_m2]    Calcium 6.2 (L) 8.5 - 10.1 mg/dL   Albumin level   Result Value Ref Range    Albumin 2.0 (L) 3.4 - 5.0 g/dL   CBC with platelets differential   Result Value Ref Range    WBC 10.9 4.0 - 11.0 10e9/L    RBC Count 3.58 (L) 4.4 - 5.9 10e12/L    Hemoglobin 9.8 (L) 13.3 - 17.7 g/dL    Hematocrit 29.9 (L) 40.0 - 53.0 %    MCV 84 78 - 100 fl    MCH 27.4 26.5 - 33.0 pg    MCHC 32.8 31.5 - 36.5 g/dL    RDW 19.8 (H) 10.0 - 15.0 %    Platelet Count 254 150 - 450 10e9/L    Diff Method Automated Method     % Neutrophils 83.0 %    % Lymphocytes 3.9 %    % Monocytes 12.5 %    % Eosinophils 0.0 %    % Basophils 0.1 %    % Immature Granulocytes 0.5 %    Nucleated RBCs 0 0 /100    Absolute Neutrophil 9.1 (H) 1.6 - 8.3 10e9/L    Absolute Lymphocytes 0.4 (L) 0.8 - 5.3 10e9/L    Absolute Monocytes 1.4 (H) 0.0 - 1.3 10e9/L    Absolute  Eosinophils 0.0 0.0 - 0.7 10e9/L    Absolute Basophils 0.0 0.0 - 0.2 10e9/L    Abs Immature Granulocytes 0.1 0 - 0.4 10e9/L    Absolute Nucleated RBC 0.0    Partial thromboplastin time   Result Value Ref Range    PTT 60 (H) 22 - 37 sec   INR   Result Value Ref Range    INR 1.82 (H) 0.86 - 1.14   Fibrinogen activity   Result Value Ref Range    Fibrinogen 71 (LL) 200 - 420 mg/dL   Comprehensive metabolic panel   Result Value Ref Range    Sodium 135 133 - 144 mmol/L    Potassium 4.0 3.4 - 5.3 mmol/L    Chloride 104 94 - 109 mmol/L    Carbon Dioxide 23 20 - 32 mmol/L    Anion Gap 8 3 - 14 mmol/L    Glucose 104 (H) 70 - 99 mg/dL    Urea Nitrogen 22 7 - 30 mg/dL    Creatinine 0.88 0.66 - 1.25 mg/dL    GFR Estimate >90 >60 mL/min/[1.73_m2]    GFR Estimate If Black >90 >60 mL/min/[1.73_m2]    Calcium 6.5 (L) 8.5 - 10.1 mg/dL    Bilirubin Total 0.5 0.2 - 1.3 mg/dL    Albumin 2.3 (L) 3.4 - 5.0 g/dL    Protein Total 3.8 (L) 6.8 - 8.8 g/dL    Alkaline Phosphatase 72 40 - 150 U/L    ALT 58 0 - 70 U/L    AST 40 0 - 45 U/L   D dimer quantitative   Result Value Ref Range    D Dimer 4.5 (H) 0.0 - 0.50 ug/ml FEU   Blood gas venous   Result Value Ref Range    Ph Venous 7.35 7.32 - 7.43 pH    PCO2 Venous 42 40 - 50 mm Hg    PO2 Venous 42 25 - 47 mm Hg    Bicarbonate Venous 23 21 - 28 mmol/L    Base Deficit Venous 2.1 mmol/L    FIO2 5.5LPM    Lactic acid whole blood   Result Value Ref Range    Lactic Acid 2.8 (H) 0.7 - 2.0 mmol/L   XR Chest Port 1 View    Narrative    EXAMINATION:  XR CHEST PORT 1 VW 11/10/2019 2:49 AM.    COMPARISON: 10/3/2019.    HISTORY:  new onset chest pain, hypotension, pancreatitis    FINDINGS: Portable view of the chest. Right IJ Port-A-Cath tip  projects over the high right atrium. The trachea is midline. The  cardiomediastinal silhouette is stable. Low lung volumes. Pulmonary  vascular congestion and small right pleural effusion. No pneumothorax.  The upper abdomen is unremarkable.      Impression     IMPRESSION: Low lung volumes with small right effusion and hazy  atelectasis/edema.    I have personally reviewed the examination and initial interpretation  and I agree with the findings.    SHENA ORTIZ MD   Blood culture   Result Value Ref Range    Specimen Description Blood White port     Special Requests Received in aerobic bottle only     Culture Micro No growth after 3 hours    CRP inflammation   Result Value Ref Range    CRP Inflammation 98.0 (H) 0.0 - 8.0 mg/L   Lactic acid whole blood   Result Value Ref Range    Lactic Acid 1.7 0.7 - 2.0 mmol/L   Basic metabolic panel   Result Value Ref Range    Sodium 135 133 - 144 mmol/L    Potassium 3.9 3.4 - 5.3 mmol/L    Chloride 103 94 - 109 mmol/L    Carbon Dioxide 26 20 - 32 mmol/L    Anion Gap 6 3 - 14 mmol/L    Glucose 93 70 - 99 mg/dL    Urea Nitrogen 22 7 - 30 mg/dL    Creatinine 0.79 0.66 - 1.25 mg/dL    GFR Estimate >90 >60 mL/min/[1.73_m2]    GFR Estimate If Black >90 >60 mL/min/[1.73_m2]    Calcium 6.7 (L) 8.5 - 10.1 mg/dL   Albumin level   Result Value Ref Range    Albumin 2.5 (L) 3.4 - 5.0 g/dL   EKG 12 lead - pediatric   Result Value Ref Range    Interpretation ECG Click View Image link to view waveform and result    XR Chest Port 1 View    Narrative    Exam: XR CHEST PORT 1 VW  11/10/2019 9:07 AM      History: follow up pulmonary edema    Comparison: Same-day. 10/1/2019.    Findings: Right port is over the high right atrium. Lung volumes are  low and decreased from the prior. Increased pleural effusions, right  greater left, with increased hazy opacities, most pronounced in the  lung bases. Cardiac silhouette and mediastinal silhouettes are similar  in size. Upper abdomen is unchanged. Tubular area of attenuation in  the base of the neck.      Impression    Impression:  1. Low volumes with increased asymmetric effusions and bibasilar  atelectasis/edema.  2. Tubular area of high attenuation in the base of neck may represent  calcified internal jugular  vein given thrombosis on prior CT.    SHENA ORTIZ MD   US Abdomen Complete    Narrative    Exam: Complete abdominal ultrasound.     History: aspariginase associated pancreatitis, please assess for fluid  collection, cyst, necrosis. Please assess kidneys as well    Comparison: 11/7/2019    Findings: The liver remains echogenic and measures 17.7 cm.  No  intrahepatic mass or biliary dilatation. Gallbladder is well distended  and normal in appearance. No gallstones. Common bile duct measures 2  mm.    There is poor visualization of the pancreas with increased attenuation  in the left upper quadrant. No fluid collection within the region of  the pancreas is appreciated. Visualized portions of the aorta and IVC  are within normal limits. New pleural effusions and small amount of  ascites.    The spleen is normal in size at 11.2 cm. The kidneys are normal in  echogenicity and echotexture. No hydronephrosis. The right kidney  measures 12.2 cm and the left kidney measures 11.9 cm.       Impression    Impression:    1. Poor definition of the pancreas and left upper quadrant, likely  related to known pancreatitis. No identified fluid collection.  2. New pleural effusions and small amount of ascites.  3. Hepatic steatosis.    SHENA ORTIZ MD   Echo Pediatric (TTE) Complete    Narrative    [id number]  [id number]  [id number]  [id number]^JULIUS^MARC^KAHLIL                                                                   Study ID: [id number]                                                 [facility] [address] [address]                                                Phone: [phone]                                Pediatric Echocardiogram  _____________________________________________________________________________  __     Name: JENNIFER  PLACIDO DIMITRIOS  Study Date: 11/10/2019 09:49 AM             Patient Location: [id number]  MRN: [id number]                             Age: 19 yrs  : 2000                             BP: 102/62 mmHg  Gender: Male  Patient Class: Inpatient                    Height: 182 cm  Ordering Provider: MARC MADRID             Weight: 83 kg  Referring Provider: FAUSTO OROPEZA     BSA: 2.0 m2  Performed By: Jones Peacock RDCS  Report approved by: Gumaro Arzola MD  Reason For Study: Other, Please Specify in Comments  _____________________________________________________________________________  __     ------CONCLUSIONS------  Normal echocardiogram. There is normal appearance and motion of the tricuspid,  mitral, pulmonary and aortic valves. No atrial, ventricular or arterial level  shunting. The left and right ventricles have normal chamber size, wall  thickness, and systolic function. The calculated single plane left ventricular  ejection fraction from the 4 chamber view is 62 %.  _____________________________________________________________________________  __        Technical information:  A complete two dimensional, MMODE, spectral and color Doppler transthoracic  echocardiogram is performed. The study quality is adequate. Images are  obtained from parasternal, apical, subcostal and suprasternal notch views.  Images were obtained from the parasternal, apical and suprasternal notch  views. ECG tracing shows regular rhythm.     Segmental Anatomy:  There is normal atrial arrangement, with concordant atrioventricular and  ventriculoarterial connections.     Systemic and pulmonary veins:  Normal coronary sinus. Color flow demonstrates flow from two pulmonary veins  entering the left atrium.     Atria and atrial septum:  Normal right atrial size. The left atrium is normal in size. There is no  atrial level shunting.        Atrioventricular valves:  The tricuspid valve is normal in appearance and motion. Trivial tricuspid  valve  insufficiency. The mitral valve is normal in appearance and motion.  There is no mitral valve insufficiency.     Ventricles and Ventricular Septum:  The left and right ventricles have normal chamber size, wall thickness, and  systolic function. The calculated single plane left ventricular ejection  fraction from the 4 chamber view is 62 %. There is no ventricular level  shunting.     Outflow tracts:  Normal great artery relationship. There is unobstructed flow through the right  ventricular outflow tract. The pulmonary valve motion is normal. There is  normal flow across the pulmonary valve. Trivial pulmonary valve insufficiency.  There is unobstructed flow through the left ventricular outflow tract.  Tricuspid aortic valve with normal appearance and motion. There is normal flow  across the aortic valve.     Great arteries:  The main pulmonary artery has normal appearance. There is unobstructed flow in  the main pulmonary artery. The pulmonary artery bifurcation is normal. There  is unobstructed flow in both branch pulmonary arteries. Normal ascending  aorta. The aortic arch appears normal. There is unobstructed antegrade flow in  the ascending, transverse arch, descending thoracic and abdominal aorta.     Arterial Shunts:  There is no arterial level shunting.     Coronaries:  Normal origin of the right and left proximal coronary arteries from the  corresponding sinus of Valsalva by 2D. There is normal flow pattern in the  left and right coronaries by color Doppler.        Effusions, catheters, cannulas and leads:  No pericardial effusion.     MMode/2D Measurements & Calculations  LA dimension: 3.3 cm                       Ao root diam: 3.4 cm  LA/Ao: 0.97                                4 Chamber EF: 62.2 %  LVMI(BSA): 89.3 grams/m2                   LVMI(Height): 36.5  RWT(MM): 0.51        Doppler Measurements & Calculations  PA V2 max: 136.1 cm/sec               TR max marialuisa: 278.0 cm/sec  PA max P.4 mmHg                    TR max P.9 mmHg     desc Ao max marialuisa: 154.7 cm/sec  desc Ao max P.6 mmHg     [place] 2D Z-SCORE VALUES  Measurement NameValue Z-ScorePredictedNormal Range  LVLd apical(4ch)8.6 cm  LVLs apical(4ch)7.4 cm     Neligh Z-Scores (Measurements & Calculations)  Measurement NameValue      Z-ScorePredictedNormal Range  IVSd(MM)        1.2 cm     1.2    1.0      0.72 - 1.35  IVSs(MM)        1.5 cm     0.68   1.4      1.0 - 1.8  LVIDd(MM)       4.4 cm     -2.3   5.3      4.5 - 6.1  LVIDs(MM)       2.2 cm     -3.1   3.4      2.7 - 4.2  LVPWd(MM)       1.1 cm     1.0    0.97     0.71 - 1.24  LVPWs(MM)       2.2 cm     2.9    1.6      1.2 - 2.0  LV mass(C)d(MM) 183.6 grams-0.47  201.8    136.1 - 299.3  FS(MM)          49.4 %     3.4    34.9     28.5 - 42.7           Report approved by: Jocy Ewing 11/10/2019 01:14 PM      Heparin 10a Level   Result Value Ref Range    Heparin 10A Level 0.18 IU/mL

## 2019-11-11 ENCOUNTER — APPOINTMENT (OUTPATIENT)
Dept: GENERAL RADIOLOGY | Facility: CLINIC | Age: 19
End: 2019-11-11
Attending: STUDENT IN AN ORGANIZED HEALTH CARE EDUCATION/TRAINING PROGRAM
Payer: MEDICAID

## 2019-11-11 ENCOUNTER — APPOINTMENT (OUTPATIENT)
Dept: GENERAL RADIOLOGY | Facility: CLINIC | Age: 19
End: 2019-11-11
Attending: PEDIATRICS
Payer: MEDICAID

## 2019-11-11 LAB
ALBUMIN SERPL-MCNC: 2.1 G/DL (ref 3.4–5)
ALP SERPL-CCNC: 95 U/L (ref 40–150)
ALT SERPL W P-5'-P-CCNC: 58 U/L (ref 0–70)
AMYLASE SERPL-CCNC: 246 U/L (ref 30–110)
ANION GAP SERPL CALCULATED.3IONS-SCNC: 4 MMOL/L (ref 3–14)
ANION GAP SERPL CALCULATED.3IONS-SCNC: 4 MMOL/L (ref 3–14)
APTT PPP: 54 SEC (ref 22–37)
AST SERPL W P-5'-P-CCNC: 51 U/L (ref 0–45)
BACTERIA SPEC CULT: NO GROWTH
BASE EXCESS BLDV CALC-SCNC: 10.5 MMOL/L
BASOPHILS # BLD AUTO: 0 10E9/L (ref 0–0.2)
BASOPHILS NFR BLD AUTO: 0.1 %
BILIRUB SERPL-MCNC: 1.3 MG/DL (ref 0.2–1.3)
BUN SERPL-MCNC: 11 MG/DL (ref 7–30)
BUN SERPL-MCNC: 11 MG/DL (ref 7–30)
CA-I BLD-MCNC: 3.9 MG/DL (ref 4.4–5.2)
CA-I BLD-MCNC: 4 MG/DL (ref 4.4–5.2)
CALCIUM SERPL-MCNC: 6.2 MG/DL (ref 8.5–10.1)
CALCIUM SERPL-MCNC: 6.3 MG/DL (ref 8.5–10.1)
CHLORIDE SERPL-SCNC: 100 MMOL/L (ref 94–109)
CHLORIDE SERPL-SCNC: 105 MMOL/L (ref 94–109)
CO2 SERPL-SCNC: 29 MMOL/L (ref 20–32)
CO2 SERPL-SCNC: 34 MMOL/L (ref 20–32)
CREAT SERPL-MCNC: 0.51 MG/DL (ref 0.66–1.25)
CREAT SERPL-MCNC: 0.52 MG/DL (ref 0.66–1.25)
CRP SERPL-MCNC: 178 MG/L (ref 0–8)
DIFFERENTIAL METHOD BLD: ABNORMAL
EOSINOPHIL # BLD AUTO: 0 10E9/L (ref 0–0.7)
EOSINOPHIL NFR BLD AUTO: 0 %
ERYTHROCYTE [DISTWIDTH] IN BLOOD BY AUTOMATED COUNT: 21.2 % (ref 10–15)
FIBRINOGEN PPP-MCNC: 114 MG/DL (ref 200–420)
GFR SERPL CREATININE-BSD FRML MDRD: >90 ML/MIN/{1.73_M2}
GFR SERPL CREATININE-BSD FRML MDRD: >90 ML/MIN/{1.73_M2}
GLUCOSE BLDC GLUCOMTR-MCNC: 87 MG/DL (ref 70–99)
GLUCOSE SERPL-MCNC: 101 MG/DL (ref 70–99)
GLUCOSE SERPL-MCNC: 82 MG/DL (ref 70–99)
HCO3 BLDV-SCNC: 36 MMOL/L (ref 21–28)
HCT VFR BLD AUTO: 24.1 % (ref 40–53)
HGB BLD-MCNC: 8.1 G/DL (ref 13.3–17.7)
IMM GRANULOCYTES # BLD: 0 10E9/L (ref 0–0.4)
IMM GRANULOCYTES NFR BLD: 0.5 %
INR PPP: 1.57 (ref 0.86–1.14)
INTERPRETATION ECG - MUSE: NORMAL
INTERPRETATION ECG - MUSE: NORMAL
LACTATE BLD-SCNC: 0.4 MMOL/L (ref 0.7–2)
LIPASE SERPL-CCNC: 1239 U/L (ref 73–393)
LMWH PPP CHRO-ACNC: 0.14 IU/ML
LMWH PPP CHRO-ACNC: 0.22 IU/ML
LYMPHOCYTES # BLD AUTO: 0.3 10E9/L (ref 0.8–5.3)
LYMPHOCYTES NFR BLD AUTO: 4.1 %
Lab: NORMAL
MAGNESIUM SERPL-MCNC: 1.8 MG/DL (ref 1.6–2.3)
MAGNESIUM SERPL-MCNC: 1.9 MG/DL (ref 1.6–2.3)
MCH RBC QN AUTO: 27.6 PG (ref 26.5–33)
MCHC RBC AUTO-ENTMCNC: 33.6 G/DL (ref 31.5–36.5)
MCV RBC AUTO: 82 FL (ref 78–100)
MONOCYTES # BLD AUTO: 1.2 10E9/L (ref 0–1.3)
MONOCYTES NFR BLD AUTO: 15.3 %
NEUTROPHILS # BLD AUTO: 6 10E9/L (ref 1.6–8.3)
NEUTROPHILS NFR BLD AUTO: 80 %
NRBC # BLD AUTO: 0 10*3/UL
NRBC BLD AUTO-RTO: 0 /100
O2/TOTAL GAS SETTING VFR VENT: 25 %
OXYHGB MFR BLDV: 69 %
PCO2 BLDV: 51 MM HG (ref 40–50)
PH BLDV: 7.46 PH (ref 7.32–7.43)
PHOSPHATE SERPL-MCNC: 1.6 MG/DL (ref 2.5–4.5)
PHOSPHATE SERPL-MCNC: 2.6 MG/DL (ref 2.5–4.5)
PLATELET # BLD AUTO: 223 10E9/L (ref 150–450)
PO2 BLDV: 36 MM HG (ref 25–47)
POTASSIUM BLD-SCNC: 3 MMOL/L (ref 3.4–5.3)
POTASSIUM SERPL-SCNC: 3.3 MMOL/L (ref 3.4–5.3)
POTASSIUM SERPL-SCNC: 3.3 MMOL/L (ref 3.4–5.3)
PROCALCITONIN SERPL-MCNC: 1.8 NG/ML
PROT SERPL-MCNC: 3.6 G/DL (ref 6.8–8.8)
RBC # BLD AUTO: 2.94 10E12/L (ref 4.4–5.9)
SODIUM SERPL-SCNC: 138 MMOL/L (ref 133–144)
SODIUM SERPL-SCNC: 138 MMOL/L (ref 133–144)
SPECIMEN SOURCE: NORMAL
WBC # BLD AUTO: 7.6 10E9/L (ref 4–11)

## 2019-11-11 PROCEDURE — 85520 HEPARIN ASSAY: CPT | Performed by: PEDIATRICS

## 2019-11-11 PROCEDURE — 25000125 ZZHC RX 250: Performed by: STUDENT IN AN ORGANIZED HEALTH CARE EDUCATION/TRAINING PROGRAM

## 2019-11-11 PROCEDURE — 25800030 ZZH RX IP 258 OP 636: Performed by: STUDENT IN AN ORGANIZED HEALTH CARE EDUCATION/TRAINING PROGRAM

## 2019-11-11 PROCEDURE — 82330 ASSAY OF CALCIUM: CPT | Performed by: STUDENT IN AN ORGANIZED HEALTH CARE EDUCATION/TRAINING PROGRAM

## 2019-11-11 PROCEDURE — 71045 X-RAY EXAM CHEST 1 VIEW: CPT

## 2019-11-11 PROCEDURE — 85730 THROMBOPLASTIN TIME PARTIAL: CPT | Performed by: STUDENT IN AN ORGANIZED HEALTH CARE EDUCATION/TRAINING PROGRAM

## 2019-11-11 PROCEDURE — 83690 ASSAY OF LIPASE: CPT | Performed by: STUDENT IN AN ORGANIZED HEALTH CARE EDUCATION/TRAINING PROGRAM

## 2019-11-11 PROCEDURE — 85520 HEPARIN ASSAY: CPT | Performed by: STUDENT IN AN ORGANIZED HEALTH CARE EDUCATION/TRAINING PROGRAM

## 2019-11-11 PROCEDURE — 84132 ASSAY OF SERUM POTASSIUM: CPT | Performed by: STUDENT IN AN ORGANIZED HEALTH CARE EDUCATION/TRAINING PROGRAM

## 2019-11-11 PROCEDURE — 20300000 ZZH R&B PICU UMMC

## 2019-11-11 PROCEDURE — 40000986 XR ABDOMEN PORT 1 VW

## 2019-11-11 PROCEDURE — C9113 INJ PANTOPRAZOLE SODIUM, VIA: HCPCS | Performed by: STUDENT IN AN ORGANIZED HEALTH CARE EDUCATION/TRAINING PROGRAM

## 2019-11-11 PROCEDURE — 86140 C-REACTIVE PROTEIN: CPT | Performed by: STUDENT IN AN ORGANIZED HEALTH CARE EDUCATION/TRAINING PROGRAM

## 2019-11-11 PROCEDURE — 40000275 ZZH STATISTIC RCP TIME EA 10 MIN

## 2019-11-11 PROCEDURE — 25000132 ZZH RX MED GY IP 250 OP 250 PS 637: Performed by: STUDENT IN AN ORGANIZED HEALTH CARE EDUCATION/TRAINING PROGRAM

## 2019-11-11 PROCEDURE — 25000128 H RX IP 250 OP 636: Performed by: STUDENT IN AN ORGANIZED HEALTH CARE EDUCATION/TRAINING PROGRAM

## 2019-11-11 PROCEDURE — 74018 RADEX ABDOMEN 1 VIEW: CPT

## 2019-11-11 PROCEDURE — 80053 COMPREHEN METABOLIC PANEL: CPT | Performed by: STUDENT IN AN ORGANIZED HEALTH CARE EDUCATION/TRAINING PROGRAM

## 2019-11-11 PROCEDURE — 82805 BLOOD GASES W/O2 SATURATION: CPT | Performed by: STUDENT IN AN ORGANIZED HEALTH CARE EDUCATION/TRAINING PROGRAM

## 2019-11-11 PROCEDURE — 25000128 H RX IP 250 OP 636: Performed by: PEDIATRICS

## 2019-11-11 PROCEDURE — 82150 ASSAY OF AMYLASE: CPT | Performed by: STUDENT IN AN ORGANIZED HEALTH CARE EDUCATION/TRAINING PROGRAM

## 2019-11-11 PROCEDURE — 80048 BASIC METABOLIC PNL TOTAL CA: CPT | Performed by: STUDENT IN AN ORGANIZED HEALTH CARE EDUCATION/TRAINING PROGRAM

## 2019-11-11 PROCEDURE — 85610 PROTHROMBIN TIME: CPT | Performed by: STUDENT IN AN ORGANIZED HEALTH CARE EDUCATION/TRAINING PROGRAM

## 2019-11-11 PROCEDURE — 00000146 ZZHCL STATISTIC GLUCOSE BY METER IP

## 2019-11-11 PROCEDURE — 85384 FIBRINOGEN ACTIVITY: CPT | Performed by: STUDENT IN AN ORGANIZED HEALTH CARE EDUCATION/TRAINING PROGRAM

## 2019-11-11 PROCEDURE — 84145 PROCALCITONIN (PCT): CPT | Performed by: STUDENT IN AN ORGANIZED HEALTH CARE EDUCATION/TRAINING PROGRAM

## 2019-11-11 PROCEDURE — 85025 COMPLETE CBC W/AUTO DIFF WBC: CPT | Performed by: STUDENT IN AN ORGANIZED HEALTH CARE EDUCATION/TRAINING PROGRAM

## 2019-11-11 PROCEDURE — 94660 CPAP INITIATION&MGMT: CPT

## 2019-11-11 PROCEDURE — 83735 ASSAY OF MAGNESIUM: CPT | Performed by: STUDENT IN AN ORGANIZED HEALTH CARE EDUCATION/TRAINING PROGRAM

## 2019-11-11 PROCEDURE — 84100 ASSAY OF PHOSPHORUS: CPT | Performed by: STUDENT IN AN ORGANIZED HEALTH CARE EDUCATION/TRAINING PROGRAM

## 2019-11-11 PROCEDURE — 83605 ASSAY OF LACTIC ACID: CPT | Performed by: STUDENT IN AN ORGANIZED HEALTH CARE EDUCATION/TRAINING PROGRAM

## 2019-11-11 PROCEDURE — 25000128 H RX IP 250 OP 636

## 2019-11-11 RX ORDER — POTASSIUM CHLORIDE 29.8 MG/ML
20 INJECTION INTRAVENOUS
Status: DISCONTINUED | OUTPATIENT
Start: 2019-11-11 | End: 2019-11-14

## 2019-11-11 RX ORDER — ONDANSETRON 2 MG/ML
8 INJECTION INTRAMUSCULAR; INTRAVENOUS EVERY 4 HOURS PRN
Status: DISCONTINUED | OUTPATIENT
Start: 2019-11-11 | End: 2019-11-15

## 2019-11-11 RX ORDER — POTASSIUM CHLORIDE 1500 MG/1
20-40 TABLET, EXTENDED RELEASE ORAL
Status: DISCONTINUED | OUTPATIENT
Start: 2019-11-11 | End: 2019-11-11

## 2019-11-11 RX ORDER — POTASSIUM CHLORIDE 7.45 MG/ML
10 INJECTION INTRAVENOUS
Status: DISCONTINUED | OUTPATIENT
Start: 2019-11-11 | End: 2019-11-11

## 2019-11-11 RX ORDER — POTASSIUM CHLORIDE 29.8 MG/ML
20 INJECTION INTRAVENOUS
Status: DISCONTINUED | OUTPATIENT
Start: 2019-11-11 | End: 2019-11-11

## 2019-11-11 RX ORDER — NOREPINEPHRINE BITARTRATE 0.06 MG/ML
0-.03 INJECTION, SOLUTION INTRAVENOUS CONTINUOUS
Status: DISCONTINUED | OUTPATIENT
Start: 2019-11-11 | End: 2019-11-13

## 2019-11-11 RX ORDER — POTASSIUM CHLORIDE 1.5 G/1.58G
20-40 POWDER, FOR SOLUTION ORAL
Status: DISCONTINUED | OUTPATIENT
Start: 2019-11-11 | End: 2019-11-11

## 2019-11-11 RX ORDER — POTASSIUM CL/LIDO/0.9 % NACL 10MEQ/0.1L
10 INTRAVENOUS SOLUTION, PIGGYBACK (ML) INTRAVENOUS
Status: DISCONTINUED | OUTPATIENT
Start: 2019-11-11 | End: 2019-11-11

## 2019-11-11 RX ORDER — CALCIUM CHLORIDE 100 MG/ML
780 INJECTION INTRAVENOUS; INTRAVENTRICULAR
Status: DISCONTINUED | OUTPATIENT
Start: 2019-11-11 | End: 2019-11-15

## 2019-11-11 RX ADMIN — METRONIDAZOLE 500 MG: 500 INJECTION, SOLUTION INTRAVENOUS at 18:13

## 2019-11-11 RX ADMIN — ENOXAPARIN SODIUM 80 MG: 80 INJECTION SUBCUTANEOUS at 21:31

## 2019-11-11 RX ADMIN — ALTEPLASE 2 MG: 2.2 INJECTION, POWDER, LYOPHILIZED, FOR SOLUTION INTRAVENOUS at 23:04

## 2019-11-11 RX ADMIN — CEFEPIME HYDROCHLORIDE 2 G: 2 INJECTION, POWDER, FOR SOLUTION INTRAVENOUS at 10:07

## 2019-11-11 RX ADMIN — SODIUM CHLORIDE, POTASSIUM CHLORIDE, SODIUM LACTATE AND CALCIUM CHLORIDE: 600; 310; 30; 20 INJECTION, SOLUTION INTRAVENOUS at 07:35

## 2019-11-11 RX ADMIN — SULFAMETHOXAZOLE AND TRIMETHOPRIM 1 TABLET: 800; 160 TABLET ORAL at 09:21

## 2019-11-11 RX ADMIN — ONDANSETRON 8 MG: 2 INJECTION INTRAMUSCULAR; INTRAVENOUS at 13:50

## 2019-11-11 RX ADMIN — SODIUM PHOSPHATE, MONOBASIC, MONOHYDRATE AND SODIUM PHOSPHATE, DIBASIC, ANHYDROUS 20 MMOL: 276; 142 INJECTION, SOLUTION INTRAVENOUS at 13:03

## 2019-11-11 RX ADMIN — PROCHLORPERAZINE EDISYLATE 10 MG: 5 INJECTION INTRAMUSCULAR; INTRAVENOUS at 09:22

## 2019-11-11 RX ADMIN — PROCHLORPERAZINE EDISYLATE 10 MG: 5 INJECTION INTRAMUSCULAR; INTRAVENOUS at 04:03

## 2019-11-11 RX ADMIN — PROCHLORPERAZINE EDISYLATE 10 MG: 5 INJECTION INTRAMUSCULAR; INTRAVENOUS at 16:52

## 2019-11-11 RX ADMIN — ACETAMINOPHEN 650 MG: 325 TABLET, FILM COATED ORAL at 09:21

## 2019-11-11 RX ADMIN — PANTOPRAZOLE SODIUM 40 MG: 40 INJECTION, POWDER, FOR SOLUTION INTRAVENOUS at 09:23

## 2019-11-11 RX ADMIN — POTASSIUM CHLORIDE 20 MEQ: 29.8 INJECTION, SOLUTION INTRAVENOUS at 15:08

## 2019-11-11 RX ADMIN — POTASSIUM CHLORIDE 20 MEQ: 29.8 INJECTION, SOLUTION INTRAVENOUS at 11:09

## 2019-11-11 RX ADMIN — MELATONIN TAB 3 MG 3 MG: 3 TAB at 22:51

## 2019-11-11 RX ADMIN — SULFAMETHOXAZOLE AND TRIMETHOPRIM 1 TABLET: 800; 160 TABLET ORAL at 19:46

## 2019-11-11 RX ADMIN — METRONIDAZOLE 500 MG: 500 INJECTION, SOLUTION INTRAVENOUS at 11:11

## 2019-11-11 RX ADMIN — CALCIUM CHLORIDE 780 MG: 100 INJECTION INTRAVENOUS; INTRAVENTRICULAR at 20:27

## 2019-11-11 RX ADMIN — CEFEPIME HYDROCHLORIDE 2 G: 2 INJECTION, POWDER, FOR SOLUTION INTRAVENOUS at 00:31

## 2019-11-11 RX ADMIN — POTASSIUM CHLORIDE 20 MEQ: 29.8 INJECTION, SOLUTION INTRAVENOUS at 18:50

## 2019-11-11 RX ADMIN — Medication: at 01:11

## 2019-11-11 RX ADMIN — PHYTONADIONE: 1 INJECTION, EMULSION INTRAMUSCULAR; INTRAVENOUS; SUBCUTANEOUS at 19:46

## 2019-11-11 RX ADMIN — ACETAMINOPHEN 650 MG: 325 TABLET, FILM COATED ORAL at 01:20

## 2019-11-11 RX ADMIN — ACETAMINOPHEN 650 MG: 325 TABLET, FILM COATED ORAL at 14:58

## 2019-11-11 RX ADMIN — ENOXAPARIN SODIUM 70 MG: 80 INJECTION SUBCUTANEOUS at 09:24

## 2019-11-11 RX ADMIN — ACETAMINOPHEN 650 MG: 325 TABLET, FILM COATED ORAL at 19:50

## 2019-11-11 RX ADMIN — METRONIDAZOLE 500 MG: 500 INJECTION, SOLUTION INTRAVENOUS at 01:22

## 2019-11-11 RX ADMIN — MELATONIN 2000 UNITS: at 10:10

## 2019-11-11 RX ADMIN — LORAZEPAM 1 MG: 2 INJECTION INTRAMUSCULAR; INTRAVENOUS at 14:20

## 2019-11-11 RX ADMIN — BUMETANIDE 0.5 MG/HR: 0.25 INJECTION INTRAMUSCULAR; INTRAVENOUS at 04:46

## 2019-11-11 RX ADMIN — LORAZEPAM 1 MG: 2 INJECTION INTRAMUSCULAR; INTRAVENOUS at 13:57

## 2019-11-11 RX ADMIN — I.V. FAT EMULSION 120 ML: 20 EMULSION INTRAVENOUS at 19:47

## 2019-11-11 RX ADMIN — SODIUM CHLORIDE, POTASSIUM CHLORIDE, SODIUM LACTATE AND CALCIUM CHLORIDE: 600; 310; 30; 20 INJECTION, SOLUTION INTRAVENOUS at 00:29

## 2019-11-11 RX ADMIN — CEFEPIME HYDROCHLORIDE 2 G: 2 INJECTION, POWDER, FOR SOLUTION INTRAVENOUS at 17:13

## 2019-11-11 RX ADMIN — ONDANSETRON 8 MG: 2 INJECTION INTRAMUSCULAR; INTRAVENOUS at 19:46

## 2019-11-11 NOTE — PLAN OF CARE
Pt appears to be comfortable, sleeping majority of shift. Pt would occasionally wake up and rate pain 7/10 however would need to be reminded about PCA and pressing button for a PRN dose. TMax 101, MD Patterson notified. Orders received for blood culture, urine culture and antibiotics. Tachypneic in the 20's but appears comfortable when in bed. Pt with increased WOB and shortness of breath with any activity including sitting up in bed. Started on CPAP with PEEP of 6 and fiO2 25%. Pt tolerating intermittently. Much encouragement given to patient to wear CPAP mask as much as possible to help improve lung status. O2 sats mid to high 90s. HR tachy 110s-120s. BPs stable on norepinephrine gtt. NPO. Complaints of intermittent nausea. PRN ativan given x1 with good relief. Scope patch in place. No BM. Adequate amounts of eyal, tea colored urine output. Pt had chest X ray, ECHO, ultra sound and CT of abdomen completed this shift. Family at bedside,  supportive of patient. Continue to monitor closely.

## 2019-11-11 NOTE — PROGRESS NOTES
[facility]    Progress Note - PICU       Date of Admission:  11/7/2019  Date of Service: 11/10/19    Assessment & Plan   Lazaro Lund is a 19 year old male admitted on 11/07/19 for intractable nausea, abdominal pain, severe dehydration, and elevated lipase and amylase consistent with PEG asparaginase-associated pancreatitis. He was transferred to the PICU for concern for distributive shock and need for fluid resuscitation, pressors, and close monitoring.     Today's changes:  - lovenox increased 70 mg --> 80 mg BID  - electrolyte replacements given (see below for thresholds to give)  - continue gentle diuresis  - TPN at 55 mL/hr overnight  - Drip feeds via NG at 10 mL/hr advancing by 10 mL q4 hr to 65 mL/hr  - Restarted zofran PRN as QTc read as normal yesterday  - Bumex stopped late this morning  - Off norepi this afternoon    FEN  Dehydration  -LR @ 50/hr, will stop when TPN starts tonight  -Start Peptamen 1.5 @ 10 mL/hr to advance by 10 mL/hr every 4 hours; goal 65/hr  -Electrolyte replacements PRN for K+, Phos, calcium  -Replace calcium for iCal <4  -Replace K+ for <3.5  -Replace phos for iCal <2  -Bumex started overnight and stopped this AM  -Stict I/Os  -PTA vitamin D    RESP  -CPAP PEEP 6    CV  -Will continue to monitor BPs; goal >100/60  -Echo yesterday showed normal function     GI  Asparaginase associated pancreatitis - Lipase >3x ULN, significant upper abdominal pain and intractable vomiting.  - IV protonix 40mg daily   - NPO   - GI consulting appreciate recs  - Morphine PCA for pain management  - CT abdomen with IV contrast 11/10; small area necrosis    H/O constipation  -Miralax daily PRN  -Senokot daily PRN    HEME/ONC  T cell lymphoblastic lymphoma   - Cycle delayed until at least 11/14 per heme notes   - Heme onc consulted   - CBC daily     DVT of the bilateral upper extremities   - Increase lovenox 70 mg --> 80 mg Q12 per heme   - Lovenox level tonight at 0100 on  11/12     PCP ppx   - Bactrim QM/T (may need to hold next week depending on persistent of nausea and vomiting)     Nausea  -Restart zofran as QTc yesterday read as normal  -Compazine q6h scheduled  -Ativan 2 mg PRN  -Benadryl PRN  -Scopolamine patch in place    At risk for chemotherapy induced cytopenias  - Maintain hgb > 7  - Maintain plt count > 30 d/t lovenox therapy    ID:  Fever  Concern for infectious pancreatitis  -Cefepime 2 g q8h (11/10-  -Metronidazole 500 mg q8h (11/10-     NEURO:  Abdominal pain 2/2 pancreatitis  -Morphine PCA  -Tylenol PRN   -PTA Melatonin    Hypotension  -Norepi 0.05 - stopped today 11/11     DVT Prophylaxis: Enoxaparin (Lovenox)  Code Status: Full    Disposition Plan    > 7 days given fluid resuscitation, pain control, appropriate urine output  The patient's care was discussed with the acting attending Dr. Lee and attending Dr. Hume.    Lew Patterson MD  [facility] Pediatrics PGY-2    Pediatric Critical Care Progress Note:     Lazaro Lund remains critically ill with hypoxic respiratory failure and hypercarbic respiratory failure in the setting of necrotizing pancreatitis. Pancreatitis secondary to chemotherapeutic therapy. Noted improvement in hemodynamics and urine output in last 24 hours.      I personally examined and evaluated the patient today. All physician orders and treatments were placed at my direction.  Formulated plan with the house staff team or resident(s) and agree with the findings and plan in this note.    I have evaluated all laboratory values and imaging studies from the past 24 hours.  Consults ongoing and ordered are Gastroenterology and Oncology    I personally managed the respiratory and hemodynamic support, metabolic abnormalities, nutritional status, antimicrobial therapy, and pain/sedation management  .   Key decisions made today included  continued gentle diuresis as hemodynamics allow. Continue with non-invasive CPAP given significant pulmonary edema and  effusions (although improved). Hemodynamics improved and goal to transition off of norepinephrine. Continue with lovenox therapy given extensive clot history although low threshold to hold if bleeding/oozing develops. With fever yesterday cefepime and flagyl were started and plan to continue those for empiric course unless repeat fever or more concern for infection. Plan to place NJ and start feeding. GI team will discuss with surgery team if consultation is needed given necrotizing pancreatitis. Pain and nausea currently adequate with current regimen.     Procedures that will happen in the ICU today are continuing NIV CPAP, weaning off norepinephrine if BPs and perfusion remain stable, continuing gentle diuresis, starting NG feeds due to poor appetite and weight loss, starting TPN for increased nutrition while increasing feeds, and continuing Lovenox for DVTs.    The above plans and care have been discussed with mother and all questions and concerns were addressed.     Pennie Lee MD    Pediatric Critical Care Progress Note:    Lazaro Lund remains critically ill with acute hypoxic and hypercarbic respiratory failure and hypotension due to acute necrotizing pancreatitis (secondary to PEG-asparaginase for T-cell lymphoblastic lymphoma).    I personally examined and evaluated the patient today. All physician orders and treatments were placed at my direction.  Formulated plan with the house staff team or resident(s) and agree with the findings and plan in this note.  I have evaluated all laboratory values and imaging studies from the past 24 hours.  Consults ongoing and ordered are Gastroenterology and Oncology  I personally managed the respiratory and hemodynamic support, metabolic abnormalities, nutritional status, antimicrobial therapy, and pain/sedation management.   Key decisions made today included continuing PEEP  Procedures that will happen in the ICU today are: non-invasive positive pressure  ventilation  The above plans and care have been discussed with mother and patient and all questions and concerns were addressed.  I spent a total of 35 minutes providing critical care services at the bedside, and on the critical care unit, evaluating the patient, directing care and reviewing laboratory values and radiologic reports for Lazaro Lund.    Janet Rae Hume, MD        Interval History    Lazaro tolerated CPAP overnight. CXR this morning showed better lung volumes, but overall stable pulmonary edema and pleural effusions. INR is slowly improving. Continues to be NPO. Pain adequately controlled on morphine PCA. Bumex started overnight with robust urine output. Stopped both bumex and norepi today.    Data reviewed today: I reviewed all medications, new labs and imaging results over the last 24 hours.     Physical Exam   Vital Signs: Temp: 99.4  F (37.4  C) Temp src: Axillary BP: 95/59 Pulse: 116 Heart Rate: 122 Resp: 29 SpO2: 97 % O2 Device: BiPAP/CPAP    Weight: 182 lbs 15.71 oz    EXAM:  GENERAL: lying in bed, in significant pain, distressed  SKIN: Clear. No significant rash, abnormal pigmentation or lesions  HEAD: Normocephalic  EYES: EOMI, no injection  NOSE: Normal without discharge.  LUNGS: Right side with diminished breath sounds over lower to mid lung field. Left side with better air movement but diminished at the base. No increased work of breathing.   HEART: Regular rhythm. Normal S1/S2. No murmurs. Pulses are hyperdynamic.  ABDOMEN: Soft, flat, non-distended. Pain in epigastric region with very mild palpation.  NEUROLOGIC: No focal findings.    Data   Recent Labs   Lab 11/11/19  0411 11/10/19  2043 11/10/19  1708 11/10/19  0548 11/10/19  0222  11/09/19  0630   WBC 7.6  --  8.6  --  10.9  --  9.5   HGB 8.1*  --  8.7*  --  9.8*  --  12.6*   MCV 82  --  82  --  84  --  81     --  296  --  254  --  383   INR 1.57* 1.61*  --   --  1.82*  --   --      --   --  135 135   < > 138    POTASSIUM 3.3*  --   --  3.9 4.0   < > 4.2   CHLORIDE 105  --   --  103 104   < > 106   CO2 29  --   --  26 23   < > 25   BUN 11  --   --  22 22   < > 17   CR 0.52*  --   --  0.79 0.88   < > 0.70   ANIONGAP 4  --   --  6 8   < > 7   MICHAEL 6.3*  --   --  6.7* 6.5*   < > 7.5*   GLC 82  --   --  93 104*   < > 114*   ALBUMIN 2.1*  --   --  2.5* 2.3*   < > 2.8*   PROTTOTAL 3.6*  --   --   --  3.8*  --   --    BILITOTAL 1.3  --   --   --  0.5  --   --    ALKPHOS 95  --   --   --  72  --   --    ALT 58  --   --   --  58  --   --    AST 51*  --   --   --  40  --   --    LIPASE 1,239*  --   --   --   --   --  4,112*    < > = values in this interval not displayed.     Recent Results (from the past 24 hour(s))   XR Chest Port 1 View    Narrative    Exam: XR CHEST PORT 1 VW  11/10/2019 9:07 AM      History: follow up pulmonary edema    Comparison: Same-day. 10/1/2019.    Findings: Right port is over the high right atrium. Lung volumes are  low and decreased from the prior. Increased pleural effusions, right  greater left, with increased hazy opacities, most pronounced in the  lung bases. Cardiac silhouette and mediastinal silhouettes are similar  in size. Upper abdomen is unchanged. Tubular area of attenuation in  the base of the neck.      Impression    Impression:  1. Low volumes with increased asymmetric effusions and bibasilar  atelectasis/edema.  2. Tubular area of high attenuation in the base of neck may represent  calcified internal jugular vein given thrombosis on prior CT.    SHENA ORTIZ MD   US Abdomen Complete    Narrative    Exam: Complete abdominal ultrasound.     History: aspariginase associated pancreatitis, please assess for fluid  collection, cyst, necrosis. Please assess kidneys as well    Comparison: 11/7/2019    Findings: The liver remains echogenic and measures 17.7 cm.  No  intrahepatic mass or biliary dilatation. Gallbladder is well distended  and normal in appearance. No gallstones. Common bile duct  measures 2  mm.    There is poor visualization of the pancreas with increased attenuation  in the left upper quadrant. No fluid collection within the region of  the pancreas is appreciated. Visualized portions of the aorta and IVC  are within normal limits. New pleural effusions and small amount of  ascites.    The spleen is normal in size at 11.2 cm. The kidneys are normal in  echogenicity and echotexture. No hydronephrosis. The right kidney  measures 12.2 cm and the left kidney measures 11.9 cm.       Impression    Impression:    1. Poor definition of the pancreas and left upper quadrant, likely  related to known pancreatitis. No identified fluid collection.  2. New pleural effusions and small amount of ascites.  3. Hepatic steatosis.    SHENA ORTIZ MD   Echo Pediatric (TTE) Complete    Narrative    [id number]  [id number]  [id number]  [id number]^JULIUS^MARC^KAHLIL                                                                   Study ID: [id number]                                                 [facility] [place] [place] [address]                                                Phone: [phone]                                Pediatric Echocardiogram  _____________________________________________________________________________  __     Name: PLACIDO RODRIGUEZ  Study Date: 11/10/2019 09:49 AM             Patient Location: [facility]  MRN: [id number]                             Age: 19 yrs  : 2000                             BP: 102/62 mmHg  Gender: Male  Patient Class: Inpatient                    Height: 182 cm  Ordering Provider: MARC MADRID             Weight: 83 kg  Referring Provider: FAUSTO OROPEZA     BSA: 2.0 m2  Performed By: Jones Peacock RDCS  Report approved by: Gumaro Arzola MD  Reason For Study: Other, Please  Specify in Comments  _____________________________________________________________________________  __     ------CONCLUSIONS------  Normal echocardiogram. There is normal appearance and motion of the tricuspid,  mitral, pulmonary and aortic valves. No atrial, ventricular or arterial level  shunting. The left and right ventricles have normal chamber size, wall  thickness, and systolic function. The calculated single plane left ventricular  ejection fraction from the 4 chamber view is 62 %.  _____________________________________________________________________________  __        Technical information:  A complete two dimensional, MMODE, spectral and color Doppler transthoracic  echocardiogram is performed. The study quality is adequate. Images are  obtained from parasternal, apical, subcostal and suprasternal notch views.  Images were obtained from the parasternal, apical and suprasternal notch  views. ECG tracing shows regular rhythm.     Segmental Anatomy:  There is normal atrial arrangement, with concordant atrioventricular and  ventriculoarterial connections.     Systemic and pulmonary veins:  Normal coronary sinus. Color flow demonstrates flow from two pulmonary veins  entering the left atrium.     Atria and atrial septum:  Normal right atrial size. The left atrium is normal in size. There is no  atrial level shunting.        Atrioventricular valves:  The tricuspid valve is normal in appearance and motion. Trivial tricuspid  valve insufficiency. The mitral valve is normal in appearance and motion.  There is no mitral valve insufficiency.     Ventricles and Ventricular Septum:  The left and right ventricles have normal chamber size, wall thickness, and  systolic function. The calculated single plane left ventricular ejection  fraction from the 4 chamber view is 62 %. There is no ventricular level  shunting.     Outflow tracts:  Normal great artery relationship. There is unobstructed flow through the  right  ventricular outflow tract. The pulmonary valve motion is normal. There is  normal flow across the pulmonary valve. Trivial pulmonary valve insufficiency.  There is unobstructed flow through the left ventricular outflow tract.  Tricuspid aortic valve with normal appearance and motion. There is normal flow  across the aortic valve.     Great arteries:  The main pulmonary artery has normal appearance. There is unobstructed flow in  the main pulmonary artery. The pulmonary artery bifurcation is normal. There  is unobstructed flow in both branch pulmonary arteries. Normal ascending  aorta. The aortic arch appears normal. There is unobstructed antegrade flow in  the ascending, transverse arch, descending thoracic and abdominal aorta.     Arterial Shunts:  There is no arterial level shunting.     Coronaries:  Normal origin of the right and left proximal coronary arteries from the  corresponding sinus of Valsalva by 2D. There is normal flow pattern in the  left and right coronaries by color Doppler.        Effusions, catheters, cannulas and leads:  No pericardial effusion.     MMode/2D Measurements & Calculations  LA dimension: 3.3 cm                       Ao root diam: 3.4 cm  LA/Ao: 0.97                                4 Chamber EF: 62.2 %  LVMI(BSA): 89.3 grams/m2                   LVMI(Height): 36.5  RWT(MM): 0.51        Doppler Measurements & Calculations  PA V2 max: 136.1 cm/sec               TR max marialuisa: 278.0 cm/sec  PA max P.4 mmHg                   TR max P.9 mmHg     desc Ao max marialuisa: 154.7 cm/sec  desc Ao max P.6 mmHg     [place] 2D Z-SCORE VALUES  Measurement NameValue Z-ScorePredictedNormal Range  LVLd apical(4ch)8.6 cm  LVLs apical(4ch)7.4 cm     [place] Z-Scores (Measurements & Calculations)  Measurement NameValue      Z-ScorePredictedNormal Range  IVSd(MM)        1.2 cm     1.2    1.0      0.72 - 1.35  IVSs(MM)        1.5 cm     0.68   1.4      1.0 - 1.8  LVIDd(MM)       4.4 cm     -2.3   5.3       4.5 - 6.1  LVIDs(MM)       2.2 cm     -3.1   3.4      2.7 - 4.2  LVPWd(MM)       1.1 cm     1.0    0.97     0.71 - 1.24  LVPWs(MM)       2.2 cm     2.9    1.6      1.2 - 2.0  LV mass(C)d(MM) 183.6 grams-0.47  201.8    136.1 - 299.3  FS(MM)          49.4 %     3.4    34.9     28.5 - 42.7           Report approved by: Jocy Ewing 11/10/2019 01:14 PM      CT Abdomen w/o & w Contrast    Narrative    Exam: CT ABDOMEN W/O & W CONTRAST, 11/10/2019 7:14 PM    Indication: Acute pancreatitis. Evaluate for necrosis    Comparison: Ultrasound from same-day. CT from 10/1/2019    Technique: CT of the abdomen and pelvis was obtained with and without  the use of intravenous contrast.  Contrast dose: 98 mL of Isovue-300    Findings:   [place]ax: Moderate bilateral pleural effusions with associated  compressive attenuation, likely atelectasis. No pneumothorax or  substantial pericardial effusion.    Abdomen/pelvis: Liver is uniformly decrease in attenuation, compatible  with steatosis. Gallbladder is distended without substantial  pericholecystic fluid. Adrenal glands are normal in appearance, as  well as the spleen. Mild perinephric stranding with uniform  attenuation. Kidneys are uniform in attenuation with extrarenal pelvis  in the right. Bowel is nonobstructive with scattered inflammatory  stranding in the omentum, mesentery, and paracolic gutters.  Vasculature is patent.    The pancreas is heterogenous with large amount of surrounding soft  tissue stranding. In the mid body there is an ill-defined area of  hypoattenuation, compatible with necrosis. The area of hypoattenuation  measures approximately 3.2 x 2.8 x 2.8 cm, but there is no internal  gas formation or evidence of hemorrhage. Additional punctate areas of  necrosis are suspected in the head an through the tail. Although there  is peripancreatic fluid and small amount of multifocal ascites, no  well-defined post inflammatory collection is appreciated.  Inflammation  abuts the stomach, duodenum and colon. The splenic vein is patent and  there is no evidence of pseudoaneurysm formation.    Bones: No suspicious bony lesion.      Impression    Impression:   1. Acute pancreatitis with 3 cm focus of necrosis in the mid body.  Additional punctate foci of necrosis are suspected, but there is no  internal hemorrhage or evidence of infection. No loculated acute  necrotic collection, venous thrombosis, or evidence of pseudoaneurysm.  2. Inflammatory change within the abdomen and pelvis with small amount  of free fluid.  3. Hepatic steatosis. Of note the 10/1/2019 exam demonstrated normal  hepatic parenchymal attenuation.  4. Moderate bilateral pleural effusions with associated atelectasis.  5. No identified gallstone or biliary ductal dilatation.    SHENA ORTIZ MD   XR Chest Port 1 View    Narrative    XR CHEST PORT 1 VW  11/11/2019 6:45 AM      HISTORY: f/u pulmonary edema and pleural effusions    COMPARISON: Previous day    FINDINGS:   Portable supine view of the chest. Port-A-Cath tip projects near the  superior atrial caval junction. The cardiac silhouette size is normal.  There are small-to-moderate bilateral pleural effusions. Mild decrease  in perihilar and basilar opacities.      Impression    IMPRESSION:   Increase in lung volumes from yesterday. Moderate pleural effusions,  and perihilar edema and basilar atelectasis, are unchanged.    FATOUMATA PINK MD     Medications     bumetanide 0.5 mg/hr (11/11/19 0735)     lactated ringers 150 mL/hr at 11/11/19 0735     morphine       norepinephrine 0.05 mcg/kg/min (11/11/19 0735)       acetaminophen  650 mg Oral Q6H     ceFEPIme (MAXIPIME) IV  2 g Intravenous Q8H     enoxaparin ANTICOAGULANT  70 mg Subcutaneous Q12H     melatonin  3 mg Oral At Bedtime     metroNIDAZOLE  500 mg Intravenous Q8H     pantoprazole (PROTONIX) IV  40 mg Intravenous Daily with breakfast     prochlorperazine  10 mg Intravenous Q6H     scopolamine   1 patch Transdermal Q72H     scopolamine   Transdermal Q8H     [START ON 11/12/2019] scopolamine   Transdermal Q72H     sodium chloride (PF)  3 mL Intracatheter Q8H     sodium chloride         sulfamethoxazole-trimethoprim  1 tablet Oral Q Mon Tues BID     Vitamin D3  2,000 Units Oral Daily

## 2019-11-11 NOTE — PROGRESS NOTES
[facility]  PEDIATRIC HEMATOLOGY/ONCOLOGY   SOCIAL WORK PROGRESS NOTE      DATA:     Lazaro is a 19 year old male with VHR T-Cell Lymphoblastic Lymphoma admitted on 11/7/19 for intractable nausea, abdominal pain and severe dehydration. He was transferred to PICU on 11/10 d/t SIRs secondary to pancreatitis (associated with PEG), resulting in capillary leak with pleural effusions, ascites, hypotension, decreased UOP, and persistent tachycardia. HONEY met supportively with Lazaro's family, Mom, Carmen and maternal grandmother, Angelica Richardson, in the PICU family room while he had an NJ tube placed. Mom and Grandmother spoke about how well he has tolerated treatment thus far. He has a very easy going personality and has strong familial and social support. He has applied for Social Security Income and was approved for Medical Assistance backdating to 9/1/19. HONEY [facility] to get the approval backdated to 8/1/19 as his diagnosis was in August and a majority of his major medical bills are from those August appointments/admissions. Social Security has yet to make a determination and they will notify him in the next month or two. HONEY has helped complete/submit [employer]uk[employer] Patient Travel Assistance and [employer] applications. [employer] was approved for up to $500 toward gas or grocery cards. They will reach out to Lazaro to discuss his preference. S application is being processed. Mom and Grandma discussed medical bills and getting them re-billed to MA (as well as what to do about August bills). HONEY has been in contact with  and [facility]. Encouraged them to call [facility] Billing and note we are working to get MA backdated to August 1st. Updated parking pass given to Mom as parking pass given was not active. SW requested Mom bring the one that was not active so it can be activated. No immediate needs noted at the end  of our visit. SW attempted to follow-up with Lazaro, who was asleep post NJ placement. Will attempt to follow-up on Tuesday.     INTERVENTION:     1. Supportive counseling. Check-in.   2. Insurance assistance.   3. Matt updates.   4. Updated parking pass.     ASSESSMENT:     Following NJ placement Lazaro fell asleep. He appeared comfortable. Mom and Grandmother are hopeful he will improve enough in the next day or so to move back up to [address]. Family is supportive and attentive. Patient is open to and appreciative of ongoing therapeutic support, advocacy, and connection with resources.     PLAN:     Social work will continue to assess needs and provide ongoing psychosocial support and access to resources.      CHEY Davis, LICSW, OSW-C  Clinical    Pediatric Hematology Oncology   [facility]   Monday-Thursday   Phone: [phone]  Pager: [phone]    NO LETTER

## 2019-11-11 NOTE — PROGRESS NOTES
11/11/19 1554   Child Life   Location PICU   Intervention Procedure Support;Preparation   Preparation Comment CCLS met with Lazaro to discuss NJ placement, CCLS described sensations and RN was present to answer additional questions.  Pt was agreeable, calm and able to communicate concerns and need for a break after second attempt.  RN also decided to offer additonal support with medication which pt was receptive to.     Family Support Comment Mother chose to leave the room for NJ placement   Anxiety Low Anxiety  (able to voice concerns/discomfort)   Major Change/Loss/Stressor/Fears medical condition, self  (oncology pt)   Techniques to [place] with Loss/Stress/Change family presence;other (see comments)  (information at age level)   Special Interests video tami   Outcomes/Follow Up Continue to Follow/Support

## 2019-11-11 NOTE — PLAN OF CARE
Tmax overnight was 99.6. Pt remains on Morphine PCA for abdominal pain. Has been drowsy, but arousable throughout the shift. On CPAP of 6 via nasal mask, pt is very short of breath with any exertion. Remains on Levo at a fixed rate of 0.5 mcg/kg/min. Pt is NPO, experiencing intermittent nausea. Bumex drip started for decreased urine output. Mom at bedside throughout shift, updated on POC.

## 2019-11-11 NOTE — PROGRESS NOTES
[facility], [place]    CONSULT Note - Pediatric Hematology Oncology Service        Date of Admission:  11/10/2019    Assessment & Plan   Lazaro Lund is a 19 year old male admitted on 11/07/19 for intractable nausea, abdominal pain and severe dehydration. 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 He was due to start Cycle 1 day 29 but this was delayed due to his presenting symptoms. Lazaro remains admitted for severe asparaginase associated pancreatitis with elevated lipase, amylase and significant abdominal pain and nausea and now with imaging consistent with pancreatic necrosis and SIRS requiring transfer to the PICU.       Today:  - Remains in PICU on Norepi for hypotension  - Recheck Hep10a level at 1300 to decide if further dose increase is merited at this time.  - Close monitoring of I/Os. Net + 2L yesterday, and weight gain of 10kg since admission as of 11/20.  - Pleural effusion R>L    GI  Asparaginase associated pancreatitis - Lipase >3xULN, significant upper abdominal pain and intractable vomiting.  - IV protonix 40mg daily  - Clear liquid diet - advance as tolerated, stop if pain worsens  - GI consulting appreciate recs  - Morphine PCA for pain management  - IVF LR @ 50ml/hr  - Repeat labs Monday - albumin, lipase, amylase, BMP  - CT abdomen per GI on 11/10 with 3 cm area of pancreatic necrosis  - Cefepime q8h started on 11/10 due to fever and high concern for systemic infection in the presence of pancreatic necrosis    HEME/ONC  T cell lymphoblastic lymphoma - VHR - treated per protocol [id number], due for D29 of consolidation [id number] Consolidation. Not neutropenic on admission   - Cycle 1 D29 delayed until at least 11/14     DVT of the bilateral upper extremities  -Subtherapeutic level - Increased Lovenox from 60mg q12h to 70mg q12h   -Retime hep Xa level 4 hours after the next dose (11/11 @ 1300).       PCP ppx  -Bactrim QM/T (may need to hold next week depending on persistent of nausea and vomiting)     Nausea  -Discontinued Zofran due to prolonged QTc  -Compazine Q6 scheduled  -Ativan 1-2 mg 6h PRN   -Benadryl PRN  -Scopolamine patch in place     At risk for chemotherapy-induced cytopenias  - maintain hgb > 7  - maintain plt count > 30 d/t lovenox therapy     FEN:  Dehydration due to poor oral intake  Capillary Leak   -Norepi 0.05mcg/kg/min  -LR @ 50ml/hr  -Stict I/O  -clear liquid diet - advance as tolerated  -PTA vitamin D     H/O constipation - consider scheduling tomorrow if no stool - at risk of opioid induced constipation  -Miralax daily PRN  -Senokot daily PRN     NEURO:  Abdominal pain 2/2 pancreatitis  -Morphine PCA  -Tylenol Q6  -PTA Melatonin    CV:  EKG obtained 11/8 for elevated HR, noted to have prolonged QTC of 478 that increased to 528 on 11/10  -avoid QTc prolonging medications  -Repeat EKG on 11/11    RESP:   Pleural efffusions 2/2 volume overload and capillary leak  - CPAP and O2 as needed     DVT Prophylaxis: Low Risk/Ambulatory with no additional VTE prophylaxis indicated given Enoxaparin (Lovenox) subcutaneous treatment  Lara Catheter: not present  Code Status: Full    Patient was discussed with attending, Dr. Reynaldo Campuzano.     Donna Barillas MD MPH  Pediatric Heme/Onc & BMT Fellow  Pager: [phone]    Physician Attestation    I saw and evaluated the patient. I discussed with the fellow and agree with the findings and plan as documented in the fellow's note.     Reynaldo Campuzano MD  Pediatric Hematology/Oncology  [facility]  Date of Service (when I saw the patient): 11/11/2019      Interval History    Ovenight, Lazaro remained stable on low dose norepi drip and IVF with Bumex gtt. He reports abdominal pain is well managed and continues on the Morphine PCA. He remained stable on CPAP overnight.    Data reviewed today: I reviewed all  medications, new labs and imaging results over the last 24 hours.     Physical Exam   Vital Signs: Temp: 99.4  F (37.4  C) Temp src: Axillary BP: 95/59 Pulse: 116 Heart Rate: 122 Resp: 29 SpO2: 97 % O2 Device: BiPAP/CPAP    Weight: 182 lbs 15.71 oz    EXAM:  GENERAL: lying in bed, sleeping comfortably  SKIN: Clear. No significant rash, abnormal pigmentation or lesions  HEAD: Normocephalic  EYES: eyes closed  EARS: Normal external canals.  NOSE: Normal without discharge.  LUNGS: CTAB, non labored breaths, BiPAP mask in place  HEART: Regular rhythm. Normal S1/S2. No murmurs. Normal pulses.  ABDOMEN: Soft, flat, non-distended.  NEUROLOGIC: sleeping, shifted a few times     Data   Recent Labs   Lab 11/11/19  0411 11/10/19  2043 11/10/19  1708 11/10/19  0548 11/10/19  0222  11/09/19  0630   WBC 7.6  --  8.6  --  10.9  --  9.5   HGB 8.1*  --  8.7*  --  9.8*  --  12.6*   MCV 82  --  82  --  84  --  81     --  296  --  254  --  383   INR 1.57* 1.61*  --   --  1.82*  --   --      --   --  135 135   < > 138   POTASSIUM 3.3*  --   --  3.9 4.0   < > 4.2   CHLORIDE 105  --   --  103 104   < > 106   CO2 29  --   --  26 23   < > 25   BUN 11  --   --  22 22   < > 17   CR 0.52*  --   --  0.79 0.88   < > 0.70   ANIONGAP 4  --   --  6 8   < > 7   MICHAEL 6.3*  --   --  6.7* 6.5*   < > 7.5*   GLC 82  --   --  93 104*   < > 114*   ALBUMIN 2.1*  --   --  2.5* 2.3*   < > 2.8*   PROTTOTAL 3.6*  --   --   --  3.8*  --   --    BILITOTAL 1.3  --   --   --  0.5  --   --    ALKPHOS 95  --   --   --  72  --   --    ALT 58  --   --   --  58  --   --    AST 51*  --   --   --  40  --   --    LIPASE 1,239*  --   --   --   --   --  4,112*    < > = values in this interval not displayed.     Recent Results (from the past 24 hour(s))   XR Chest Port 1 View    Narrative    Exam: XR CHEST PORT 1 VW  11/10/2019 9:07 AM      History: follow up pulmonary edema    Comparison: Same-day. 10/1/2019.    Findings: Right port is over the high right atrium.  Lung volumes are  low and decreased from the prior. Increased pleural effusions, right  greater left, with increased hazy opacities, most pronounced in the  lung bases. Cardiac silhouette and mediastinal silhouettes are similar  in size. Upper abdomen is unchanged. Tubular area of attenuation in  the base of the neck.      Impression    Impression:  1. Low volumes with increased asymmetric effusions and bibasilar  atelectasis/edema.  2. Tubular area of high attenuation in the base of neck may represent  calcified internal jugular vein given thrombosis on prior CT.    SHENA ORTIZ MD   US Abdomen Complete    Narrative    Exam: Complete abdominal ultrasound.     History: aspariginase associated pancreatitis, please assess for fluid  collection, cyst, necrosis. Please assess kidneys as well    Comparison: 11/7/2019    Findings: The liver remains echogenic and measures 17.7 cm.  No  intrahepatic mass or biliary dilatation. Gallbladder is well distended  and normal in appearance. No gallstones. Common bile duct measures 2  mm.    There is poor visualization of the pancreas with increased attenuation  in the left upper quadrant. No fluid collection within the region of  the pancreas is appreciated. Visualized portions of the aorta and IVC  are within normal limits. New pleural effusions and small amount of  ascites.    The spleen is normal in size at 11.2 cm. The kidneys are normal in  echogenicity and echotexture. No hydronephrosis. The right kidney  measures 12.2 cm and the left kidney measures 11.9 cm.       Impression    Impression:    1. Poor definition of the pancreas and left upper quadrant, likely  related to known pancreatitis. No identified fluid collection.  2. New pleural effusions and small amount of ascites.  3. Hepatic steatosis.    SHENA ORTIZ MD   Echo Pediatric (TTE) Complete    Narrative    [id number]  [id number]  [id number]  [id number]^[id number]^KAHLIL                                                                    Study ID: [id number]                                                 [facility]                                                  [address]                                                [place] [address]                                                Phone: [phone]                                Pediatric Echocardiogram  _____________________________________________________________________________  __     Name: PLACIDO RODRIGUEZ  Study Date: 11/10/2019 09:49 AM             Patient Location: [facility]  MRN: [id number]                             Age: 19 yrs  : 2000                             BP: 102/62 mmHg  Gender: Male  Patient Class: Inpatient                    Height: 182 cm  Ordering Provider: MARC MADRID             Weight: 83 kg  Referring Provider: FAUSTO OROPEZA     BSA: 2.0 m2  Performed By: Jones Peacock RDCS  Report approved by: Gumaro Arzola MD  Reason For Study: Other, Please Specify in Comments  _____________________________________________________________________________  __     ------CONCLUSIONS------  Normal echocardiogram. There is normal appearance and motion of the tricuspid,  mitral, pulmonary and aortic valves. No atrial, ventricular or arterial level  shunting. The left and right ventricles have normal chamber size, wall  thickness, and systolic function. The calculated single plane left ventricular  ejection fraction from the 4 chamber view is 62 %.  _____________________________________________________________________________  __        Technical information:  A complete two dimensional, MMODE, spectral and color Doppler transthoracic  echocardiogram is performed. The study quality is adequate. Images are  obtained from parasternal, apical, subcostal and suprasternal notch views.  Images were obtained from the parasternal, apical and suprasternal notch  views. ECG tracing  shows regular rhythm.     Segmental Anatomy:  There is normal atrial arrangement, with concordant atrioventricular and  ventriculoarterial connections.     Systemic and pulmonary veins:  Normal coronary sinus. Color flow demonstrates flow from two pulmonary veins  entering the left atrium.     Atria and atrial septum:  Normal right atrial size. The left atrium is normal in size. There is no  atrial level shunting.        Atrioventricular valves:  The tricuspid valve is normal in appearance and motion. Trivial tricuspid  valve insufficiency. The mitral valve is normal in appearance and motion.  There is no mitral valve insufficiency.     Ventricles and Ventricular Septum:  The left and right ventricles have normal chamber size, wall thickness, and  systolic function. The calculated single plane left ventricular ejection  fraction from the 4 chamber view is 62 %. There is no ventricular level  shunting.     Outflow tracts:  Normal great artery relationship. There is unobstructed flow through the right  ventricular outflow tract. The pulmonary valve motion is normal. There is  normal flow across the pulmonary valve. Trivial pulmonary valve insufficiency.  There is unobstructed flow through the left ventricular outflow tract.  Tricuspid aortic valve with normal appearance and motion. There is normal flow  across the aortic valve.     Great arteries:  The main pulmonary artery has normal appearance. There is unobstructed flow in  the main pulmonary artery. The pulmonary artery bifurcation is normal. There  is unobstructed flow in both branch pulmonary arteries. Normal ascending  aorta. The aortic arch appears normal. There is unobstructed antegrade flow in  the ascending, transverse arch, descending thoracic and abdominal aorta.     Arterial Shunts:  There is no arterial level shunting.     Coronaries:  Normal origin of the right and left proximal coronary arteries from the  corresponding sinus of Valsalva by 2D. There  is normal flow pattern in the  left and right coronaries by color Doppler.        Effusions, catheters, cannulas and leads:  No pericardial effusion.     MMode/2D Measurements & Calculations  LA dimension: 3.3 cm                       Ao root diam: 3.4 cm  LA/Ao: 0.97                                4 Chamber EF: 62.2 %  LVMI(BSA): 89.3 grams/m2                   LVMI(Height): 36.5  RWT(MM): 0.51        Doppler Measurements & Calculations  PA V2 max: 136.1 cm/sec               TR max marialuisa: 278.0 cm/sec  PA max P.4 mmHg                   TR max P.9 mmHg     desc Ao max marialuisa: 154.7 cm/sec  desc Ao max P.6 mmHg     [place] 2D Z-SCORE VALUES  Measurement NameValue Z-ScorePredictedNormal Range  LVLd apical(4ch)8.6 cm  LVLs apical(4ch)7.4 cm     [place] Z-Scores (Measurements & Calculations)  Measurement NameValue      Z-ScorePredictedNormal Range  IVSd(MM)        1.2 cm     1.2    1.0      0.72 - 1.35  IVSs(MM)        1.5 cm     0.68   1.4      1.0 - 1.8  LVIDd(MM)       4.4 cm     -2.3   5.3      4.5 - 6.1  LVIDs(MM)       2.2 cm     -3.1   3.4      2.7 - 4.2  LVPWd(MM)       1.1 cm     1.0    0.97     0.71 - 1.24  LVPWs(MM)       2.2 cm     2.9    1.6      1.2 - 2.0  LV mass(C)d(MM) 183.6 grams-0.47  201.8    136.1 - 299.3  FS(MM)          49.4 %     3.4    34.9     28.5 - 42.7           Report approved by: Jocy Ewing 11/10/2019 01:14 PM      CT Abdomen w/o & w Contrast    Narrative    Exam: CT ABDOMEN W/O & W CONTRAST, 11/10/2019 7:14 PM    Indication: Acute pancreatitis. Evaluate for necrosis    Comparison: Ultrasound from same-day. CT from 10/1/2019    Technique: CT of the abdomen and pelvis was obtained with and without  the use of intravenous contrast.  Contrast dose: 98 mL of Isovue-300    Findings:   [place]: Moderate bilateral pleural effusions with associated  compressive attenuation, likely atelectasis. No pneumothorax or  substantial pericardial effusion.    Abdomen/pelvis: Liver is uniformly  decrease in attenuation, compatible  with steatosis. Gallbladder is distended without substantial  pericholecystic fluid. Adrenal glands are normal in appearance, as  well as the spleen. Mild perinephric stranding with uniform  attenuation. Kidneys are uniform in attenuation with extrarenal pelvis  in the right. Bowel is nonobstructive with scattered inflammatory  stranding in the omentum, mesentery, and paracolic gutters.  Vasculature is patent.    The pancreas is heterogenous with large amount of surrounding soft  tissue stranding. In the mid body there is an ill-defined area of  hypoattenuation, compatible with necrosis. The area of hypoattenuation  measures approximately 3.2 x 2.8 x 2.8 cm, but there is no internal  gas formation or evidence of hemorrhage. Additional punctate areas of  necrosis are suspected in the head an through the tail. Although there  is peripancreatic fluid and small amount of multifocal ascites, no  well-defined post inflammatory collection is appreciated. Inflammation  abuts the stomach, duodenum and colon. The splenic vein is patent and  there is no evidence of pseudoaneurysm formation.    Bones: No suspicious bony lesion.      Impression    Impression:   1. Acute pancreatitis with 3 cm focus of necrosis in the mid body.  Additional punctate foci of necrosis are suspected, but there is no  internal hemorrhage or evidence of infection. No loculated acute  necrotic collection, venous thrombosis, or evidence of pseudoaneurysm.  2. Inflammatory change within the abdomen and pelvis with small amount  of free fluid.  3. Hepatic steatosis. Of note the 10/1/2019 exam demonstrated normal  hepatic parenchymal attenuation.  4. Moderate bilateral pleural effusions with associated atelectasis.  5. No identified gallstone or biliary ductal dilatation.    SHENA ORTIZ MD   XR Chest Port 1 View    Narrative    XR CHEST PORT 1 VW  11/11/2019 6:45 AM      HISTORY: f/u pulmonary edema and pleural  effusions    COMPARISON: Previous day    FINDINGS:   Portable supine view of the chest. Port-A-Cath tip projects near the  superior atrial caval junction. The cardiac silhouette size is normal.  There are small-to-moderate bilateral pleural effusions. Mild decrease  in perihilar and basilar opacities.      Impression    IMPRESSION:   Increase in lung volumes from yesterday. Moderate pleural effusions,  and perihilar edema and basilar atelectasis, are unchanged.    FATOUMATA PINK MD     Medications     bumetanide 0.5 mg/hr (11/11/19 0735)     lactated ringers 150 mL/hr at 11/11/19 0735     morphine       norepinephrine 0.05 mcg/kg/min (11/11/19 0735)       acetaminophen  650 mg Oral Q6H     ceFEPIme (MAXIPIME) IV  2 g Intravenous Q8H     enoxaparin ANTICOAGULANT  70 mg Subcutaneous Q12H     melatonin  3 mg Oral At Bedtime     metroNIDAZOLE  500 mg Intravenous Q8H     pantoprazole (PROTONIX) IV  40 mg Intravenous Daily with breakfast     prochlorperazine  10 mg Intravenous Q6H     scopolamine  1 patch Transdermal Q72H     scopolamine   Transdermal Q8H     [START ON 11/12/2019] scopolamine   Transdermal Q72H     sodium chloride (PF)  3 mL Intracatheter Q8H     sodium chloride         sulfamethoxazole-trimethoprim  1 tablet Oral Q Mon Tues BID     Vitamin D3  2,000 Units Oral Daily

## 2019-11-11 NOTE — PROGRESS NOTES
"CLINICAL NUTRITION SERVICES - PEDIATRIC ASSESSMENT NOTE    REASON FOR ASSESSMENT  Lazaro Lund is a 19 year old male seen by the dietitian for Consult - Pharmacy/Nutrition to start and manage PN.     ANTHROPOMETRICS  Height: 182 cm (5' 11.65\")  Current Weight: 83 kg   Admit Weight: 73.5 kg   IBW: ~80 kg   BMI: 22.1 kg/m^2, normal (based on current height and admit wt)   Dosing Weight: 73.5 kg   Comments: Per review of weight trends below, appears Lazaro has lost 6.4 kg (8.1%) x 2 weeks, ~2.9 kg (3.8%) x 1 month and 1.9 kg (2.6%) x 2 months, though difficult to accurately assess true weight loss with fluid trends and per discussion with team likely came in fluid-up. Weight significantly up since admission due to fluid overload.    Wt Readings from Last 20 Encounters:   11/10/19 83 kg (182 lb 15.7 oz)   11/07/19 72.4 kg (159 lb 9.8 oz)   10/31/19 73.1 kg (161 lb 2.5 oz)   10/24/19 79.9 kg (176 lb 2.4 oz)   10/17/19 76.9 kg (169 lb 8.5 oz)   10/14/19 75.1 kg (165 lb 9.1 oz)   10/10/19 74.8 kg (164 lb 14.5 oz)   10/03/19 76.4 kg (168 lb 6.9 oz)   10/01/19 76.4 kg (168 lb 6.9 oz)   09/26/19 77.6 kg (171 lb 1.2 oz)   09/26/19 77.9 kg (171 lb 11.8 oz)   09/24/19 79 kg (174 lb 2.6 oz)   09/19/19 72.3 kg (159 lb 6.3 oz)   09/17/19 73.3 kg (161 lb 9.6 oz)   09/16/19 74.8 kg (164 lb 14.5 oz)   09/08/19 75.4 kg (166 lb 3.6 oz)   08/22/13 66.4 kg (146 lb 6.4 oz) (77 %)*   08/19/10 47.4 kg (104 lb 8 oz) (72 %)*   01/22/07 35.8 kg (79 lb) (91 %)*   09/27/06 34.9 kg (77 lb) (92 %)*     * Growth percentiles are based on CDC (Boys, 2-20 Years) data.     NUTRITION HISTORY  Unable to obtain full nutrition hx at this time. Per previous RD notes, pt followed a regular diet at home with no known food allergies. Per chart review, was feeling well up until 6 days ago (11/5). At this time, Lazaro started experiencing intractable nausea, abdominal pain, and was experiencing vomiting with poor oral intake/appetite. Per H&P, he has been " unable to keep any PO down since 11/6 before admission.  Intake since admission has been minimal as patient has been NPO or on clear liquids majority of admission.     Information obtained from Chart  Factors affecting nutrition intake include:abdominal pain, decreased appetite, nausea and vomiting    CURRENT NUTRITION ORDERS  Diet: NPO    CURRENT NUTRITION SUPPORT   Enteral Nutrition: None currently, plan for NJ placement and feeds as able/pending epi wean.   Parenteral Nutrition: None currently, plan to initiate today    PHYSICAL FINDINGS  Observed  Unable to assess at this time.   Obtained from Chart/Interdisciplinary Team  -Pt admitted on 11/7/2019 for intractable nausea, abdominal pain and severe dehydration. 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  -Transferred to PICU 11/10 with concern for disributive shock and need for fluid resuscitation, pressors, and close montioring.     LABS  Labs reviewed  K+ 3.3 (L)  Add-on Mg++ 1.9 (WNL), Phos 1.6 (L)  Amylase 246 (H), Lipase 1239 (H)  Vitamin D deficiency screen 11 (L) as of 10/1/19    MEDICATIONS  Medications reviewed  Vit D 2000 international unit(s) daily    Weaning norepinephrine     ASSESSED NUTRITION NEEDS:  BMR (1800 kcal) x 1.2-1.4 = 8857-9785  Estimated Energy Needs: 30-35 kcal/kg PO/EN, 25-30 kcal/kg PN   Estimated Protein Needs: 1.2-1.5 g/kg  Estimated Fluid Needs: 2570 mLs baseline or Per Team   Micronutrient Needs: RDA/age     MALNUTRITION  % Intake: </= 50% for >/= 5 days (severe)  % Weight Loss: Up to 5% in 1 month (non-severe)  Subcutaneous Fat Loss: Unable to assess at this time   Muscle Loss: Unable to assess at this time   Fluid Accumulation/Edema: noted to be fluid-up   Malnutrition Diagnosis: At least non-severe malnutrition in the context of acute illness.     NUTRITION DIAGNOSIS:  Inadequate protein-energy intake related to current  nutrition orders as evidenced by NPO with poor oral intake over the past week with plans to initiate nutrition support.     INTERVENTIONS  Nutrition Prescription  Meet assessed nutrition needs via nutrition support until able to take adequate PO.     Nutrition Education:   Updated on nutrition POC per team in rounds.     Implementation:  Enteral Nutrition - See recommendations below if/when able to initiate feeds s/p feeding tube placement.   Parenteral Nutrition - See recommendations below. Initiating PN today 11/11.   Collaboration and Referral of Nutrition care - Plan to initiate PN today and possibly place NJ-tube for enteral feeds pending clinical course/weaning pressors.     Goals   1. Meet 100% assessed nutrition needs via nutrition support at this time.    2. Minimum weight maintenance surrounding hospitalization/no further true weight loss.     FOLLOW UP/MONITORING  Energy Intake   Enteral and parenteral nutrition intake   Anthropometric measurements   Electrolyte and renal profile     RECOMMENDATIONS  1. Given hx of weight loss/poor oral intake, would monitor risk for refeeding syndrome with introduction of nutrition support. Check baseline electrolytes (K+ currently low, Mg++, Phos currently low) and replace as indicated per labs and continue to monitor daily with advancement in nutrition support.     2. Recommend intiate PN at a GIR 1.5 mg/kg/min (~159 gm dextrose), 1.5 g/kg AA (~110 gm pro), 240 mL IL (0.65 gm/kg) daily. Advance GIR daily to goal of 2.74 mg/kg/min (290 gm dextrose) pending acceptable lytes while maintaining 1.5 g/kg AA (~110 gm pro), 240 mL IL (0.65 gm/kg) daily per nutrition dosing weight of 73.5 kg.   -Goal PN to provide GIR 2.74 mg/kg/min (290 gm dextrose), 1.5 g/kg AA (~110 gm pro), 240 mL IL (0.65 gm/kg or 25% kcals from fat) to provide 1906 kcal/day (26 kcal/kg).     3. If able to place feeding tube and enteral nutrition becomes nutrition POC, would recommend use of Peptamen 1.5.  Would recommend initate feeds at 10 mL/hr and titrate up to goal as tolerated (recommend slow advancement with risk for refeeding). Goal feeds to meet 100% assessed nutrition needs would be Peptamen 1.5 @ 65 mL/hr x 24 hours/day for 1560 ml/day (21 mL/kg), 2340 kcals (32 kcal/kg/day), 106 g PRO (1.4 g/kg/day).   -Would need an additional 1010 mL free water to meet baseline hydration needs (or fluid goals per team with current fluid overload status)  -Wean PN as tolerated with advancement in oral intake and/or enteral feeds.     4. Advance diet once medically appropriate. RD to provide recommendations on weaning/adjusting nutrition support pending adequacy of oral intake.     Karly Mendiola RD, LD  Unit Pager: [phone]

## 2019-11-12 ENCOUNTER — APPOINTMENT (OUTPATIENT)
Dept: INTERVENTIONAL RADIOLOGY/VASCULAR | Facility: CLINIC | Age: 19
End: 2019-11-12
Attending: PHYSICIAN ASSISTANT
Payer: MEDICAID

## 2019-11-12 ENCOUNTER — APPOINTMENT (OUTPATIENT)
Dept: GENERAL RADIOLOGY | Facility: CLINIC | Age: 19
End: 2019-11-12
Attending: PEDIATRICS
Payer: MEDICAID

## 2019-11-12 LAB
ALBUMIN SERPL-MCNC: 1.8 G/DL (ref 3.4–5)
ALP SERPL-CCNC: 115 U/L (ref 40–150)
ALT SERPL W P-5'-P-CCNC: 42 U/L (ref 0–70)
ANION GAP SERPL CALCULATED.3IONS-SCNC: 2 MMOL/L (ref 3–14)
ANION GAP SERPL CALCULATED.3IONS-SCNC: 4 MMOL/L (ref 3–14)
AST SERPL W P-5'-P-CCNC: 27 U/L (ref 0–45)
BASOPHILS # BLD AUTO: 0 10E9/L (ref 0–0.2)
BASOPHILS NFR BLD AUTO: 0.1 %
BILIRUB DIRECT SERPL-MCNC: 0.5 MG/DL (ref 0–0.2)
BILIRUB SERPL-MCNC: 1 MG/DL (ref 0.2–1.3)
BUN SERPL-MCNC: 13 MG/DL (ref 7–30)
CA-I BLD-MCNC: 3.9 MG/DL (ref 4.4–5.2)
CA-I BLD-MCNC: 4.1 MG/DL (ref 4.4–5.2)
CALCIUM SERPL-MCNC: 5.9 MG/DL (ref 8.5–10.1)
CHLORIDE SERPL-SCNC: 103 MMOL/L (ref 94–109)
CHLORIDE SERPL-SCNC: 106 MMOL/L (ref 94–109)
CO2 SERPL-SCNC: 32 MMOL/L (ref 20–32)
CO2 SERPL-SCNC: 32 MMOL/L (ref 20–32)
CREAT SERPL-MCNC: 0.5 MG/DL (ref 0.66–1.25)
DIFFERENTIAL METHOD BLD: ABNORMAL
EOSINOPHIL # BLD AUTO: 0 10E9/L (ref 0–0.7)
EOSINOPHIL NFR BLD AUTO: 0 %
ERYTHROCYTE [DISTWIDTH] IN BLOOD BY AUTOMATED COUNT: 22.3 % (ref 10–15)
GFR SERPL CREATININE-BSD FRML MDRD: >90 ML/MIN/{1.73_M2}
GLUCOSE SERPL-MCNC: 130 MG/DL (ref 70–99)
HCT VFR BLD AUTO: 22.2 % (ref 40–53)
HGB BLD-MCNC: 7.6 G/DL (ref 13.3–17.7)
IMM GRANULOCYTES # BLD: 0.1 10E9/L (ref 0–0.4)
IMM GRANULOCYTES NFR BLD: 0.7 %
INR PPP: 1.48 (ref 0.86–1.14)
LMWH PPP CHRO-ACNC: <0.1 IU/ML
LYMPHOCYTES # BLD AUTO: 0.4 10E9/L (ref 0.8–5.3)
LYMPHOCYTES NFR BLD AUTO: 4 %
MAGNESIUM SERPL-MCNC: 1.8 MG/DL (ref 1.6–2.3)
MAGNESIUM SERPL-MCNC: 1.9 MG/DL (ref 1.6–2.3)
MCH RBC QN AUTO: 29 PG (ref 26.5–33)
MCHC RBC AUTO-ENTMCNC: 34.2 G/DL (ref 31.5–36.5)
MCV RBC AUTO: 85 FL (ref 78–100)
MONOCYTES # BLD AUTO: 1.2 10E9/L (ref 0–1.3)
MONOCYTES NFR BLD AUTO: 12.7 %
NEUTROPHILS # BLD AUTO: 7.7 10E9/L (ref 1.6–8.3)
NEUTROPHILS NFR BLD AUTO: 82.5 %
NRBC # BLD AUTO: 0 10*3/UL
NRBC BLD AUTO-RTO: 0 /100
PHOSPHATE SERPL-MCNC: 0.7 MG/DL (ref 2.5–4.5)
PHOSPHATE SERPL-MCNC: 1.6 MG/DL (ref 2.5–4.5)
PLATELET # BLD AUTO: 222 10E9/L (ref 150–450)
POTASSIUM SERPL-SCNC: 2.9 MMOL/L (ref 3.4–5.3)
POTASSIUM SERPL-SCNC: 3.4 MMOL/L (ref 3.4–5.3)
PREALB SERPL IA-MCNC: <3 MG/DL (ref 15–45)
PROT SERPL-MCNC: 3.3 G/DL (ref 6.8–8.8)
RBC # BLD AUTO: 2.62 10E12/L (ref 4.4–5.9)
SODIUM SERPL-SCNC: 139 MMOL/L (ref 133–144)
SODIUM SERPL-SCNC: 140 MMOL/L (ref 133–144)
WBC # BLD AUTO: 9.4 10E9/L (ref 4–11)

## 2019-11-12 PROCEDURE — 40000802 ZZH SITE CHECK

## 2019-11-12 PROCEDURE — 85610 PROTHROMBIN TIME: CPT | Performed by: STUDENT IN AN ORGANIZED HEALTH CARE EDUCATION/TRAINING PROGRAM

## 2019-11-12 PROCEDURE — 25000128 H RX IP 250 OP 636: Performed by: STUDENT IN AN ORGANIZED HEALTH CARE EDUCATION/TRAINING PROGRAM

## 2019-11-12 PROCEDURE — 25800030 ZZH RX IP 258 OP 636

## 2019-11-12 PROCEDURE — 80051 ELECTROLYTE PANEL: CPT | Performed by: STUDENT IN AN ORGANIZED HEALTH CARE EDUCATION/TRAINING PROGRAM

## 2019-11-12 PROCEDURE — 71045 X-RAY EXAM CHEST 1 VIEW: CPT

## 2019-11-12 PROCEDURE — 82248 BILIRUBIN DIRECT: CPT | Performed by: STUDENT IN AN ORGANIZED HEALTH CARE EDUCATION/TRAINING PROGRAM

## 2019-11-12 PROCEDURE — 27210436 ZZH NUTRITION PRODUCT SEMIELEM INTERMED CAN

## 2019-11-12 PROCEDURE — 25000125 ZZHC RX 250: Performed by: STUDENT IN AN ORGANIZED HEALTH CARE EDUCATION/TRAINING PROGRAM

## 2019-11-12 PROCEDURE — 25000132 ZZH RX MED GY IP 250 OP 250 PS 637: Performed by: STUDENT IN AN ORGANIZED HEALTH CARE EDUCATION/TRAINING PROGRAM

## 2019-11-12 PROCEDURE — 40000281 ZZH STATISTIC TRANSPORT TIME EA 15 MIN

## 2019-11-12 PROCEDURE — 25000128 H RX IP 250 OP 636: Performed by: RADIOLOGY

## 2019-11-12 PROCEDURE — 40000275 ZZH STATISTIC RCP TIME EA 10 MIN

## 2019-11-12 PROCEDURE — 82330 ASSAY OF CALCIUM: CPT

## 2019-11-12 PROCEDURE — 85025 COMPLETE CBC W/AUTO DIFF WBC: CPT | Performed by: STUDENT IN AN ORGANIZED HEALTH CARE EDUCATION/TRAINING PROGRAM

## 2019-11-12 PROCEDURE — 84100 ASSAY OF PHOSPHORUS: CPT | Performed by: STUDENT IN AN ORGANIZED HEALTH CARE EDUCATION/TRAINING PROGRAM

## 2019-11-12 PROCEDURE — 83735 ASSAY OF MAGNESIUM: CPT | Performed by: STUDENT IN AN ORGANIZED HEALTH CARE EDUCATION/TRAINING PROGRAM

## 2019-11-12 PROCEDURE — 25800030 ZZH RX IP 258 OP 636: Performed by: STUDENT IN AN ORGANIZED HEALTH CARE EDUCATION/TRAINING PROGRAM

## 2019-11-12 PROCEDURE — 20300000 ZZH R&B PICU UMMC

## 2019-11-12 PROCEDURE — C9113 INJ PANTOPRAZOLE SODIUM, VIA: HCPCS | Performed by: STUDENT IN AN ORGANIZED HEALTH CARE EDUCATION/TRAINING PROGRAM

## 2019-11-12 PROCEDURE — 40000257 ZZH STATISTIC CONSULT NO CHARGE VASC ACCESS

## 2019-11-12 PROCEDURE — 40000556 ZZH STATISTIC PERIPHERAL IV START W US GUIDANCE

## 2019-11-12 PROCEDURE — 76000 FLUOROSCOPY <1 HR PHYS/QHP: CPT | Mod: RT

## 2019-11-12 PROCEDURE — 25000125 ZZHC RX 250

## 2019-11-12 PROCEDURE — 80053 COMPREHEN METABOLIC PANEL: CPT | Performed by: STUDENT IN AN ORGANIZED HEALTH CARE EDUCATION/TRAINING PROGRAM

## 2019-11-12 PROCEDURE — 85520 HEPARIN ASSAY: CPT | Performed by: STUDENT IN AN ORGANIZED HEALTH CARE EDUCATION/TRAINING PROGRAM

## 2019-11-12 PROCEDURE — 94660 CPAP INITIATION&MGMT: CPT

## 2019-11-12 PROCEDURE — 84134 ASSAY OF PREALBUMIN: CPT | Performed by: STUDENT IN AN ORGANIZED HEALTH CARE EDUCATION/TRAINING PROGRAM

## 2019-11-12 PROCEDURE — 82330 ASSAY OF CALCIUM: CPT | Performed by: STUDENT IN AN ORGANIZED HEALTH CARE EDUCATION/TRAINING PROGRAM

## 2019-11-12 RX ORDER — SODIUM CHLORIDE 9 MG/ML
INJECTION, SOLUTION INTRAVENOUS
Status: DISCONTINUED
Start: 2019-11-12 | End: 2019-11-12 | Stop reason: HOSPADM

## 2019-11-12 RX ORDER — HEPARIN SODIUM (PORCINE) LOCK FLUSH IV SOLN 100 UNIT/ML 100 UNIT/ML
5 SOLUTION INTRAVENOUS
Status: DISCONTINUED | OUTPATIENT
Start: 2019-11-12 | End: 2019-11-17 | Stop reason: HOSPADM

## 2019-11-12 RX ORDER — POLYETHYLENE GLYCOL 3350 17 G/17G
17 POWDER, FOR SOLUTION ORAL DAILY
Status: DISCONTINUED | OUTPATIENT
Start: 2019-11-12 | End: 2019-11-17 | Stop reason: HOSPADM

## 2019-11-12 RX ORDER — FUROSEMIDE 10 MG/ML
10 INJECTION INTRAMUSCULAR; INTRAVENOUS ONCE
Status: COMPLETED | OUTPATIENT
Start: 2019-11-12 | End: 2019-11-12

## 2019-11-12 RX ORDER — FUROSEMIDE 10 MG/ML
20 INJECTION INTRAMUSCULAR; INTRAVENOUS ONCE
Status: DISCONTINUED | OUTPATIENT
Start: 2019-11-12 | End: 2019-11-12

## 2019-11-12 RX ORDER — LIDOCAINE 40 MG/G
CREAM TOPICAL
Status: DISCONTINUED | OUTPATIENT
Start: 2019-11-12 | End: 2019-11-13

## 2019-11-12 RX ORDER — FUROSEMIDE 10 MG/ML
20 INJECTION INTRAMUSCULAR; INTRAVENOUS ONCE
Status: COMPLETED | OUTPATIENT
Start: 2019-11-12 | End: 2019-11-12

## 2019-11-12 RX ADMIN — POTASSIUM & SODIUM PHOSPHATES POWDER PACK 280-160-250 MG 1 PACKET: 280-160-250 PACK at 13:27

## 2019-11-12 RX ADMIN — ACETAMINOPHEN 650 MG: 325 TABLET, FILM COATED ORAL at 20:09

## 2019-11-12 RX ADMIN — METRONIDAZOLE 500 MG: 500 INJECTION, SOLUTION INTRAVENOUS at 11:56

## 2019-11-12 RX ADMIN — MELATONIN 2000 UNITS: at 08:54

## 2019-11-12 RX ADMIN — LORAZEPAM 2 MG: 2 INJECTION INTRAMUSCULAR; INTRAVENOUS at 09:21

## 2019-11-12 RX ADMIN — POTASSIUM & SODIUM PHOSPHATES POWDER PACK 280-160-250 MG 1 PACKET: 280-160-250 PACK at 20:09

## 2019-11-12 RX ADMIN — Medication 5 ML: at 16:14

## 2019-11-12 RX ADMIN — ENOXAPARIN SODIUM 80 MG: 80 INJECTION SUBCUTANEOUS at 08:55

## 2019-11-12 RX ADMIN — METRONIDAZOLE 500 MG: 500 INJECTION, SOLUTION INTRAVENOUS at 18:25

## 2019-11-12 RX ADMIN — ACETAMINOPHEN 650 MG: 325 TABLET, FILM COATED ORAL at 08:54

## 2019-11-12 RX ADMIN — PANTOPRAZOLE SODIUM 40 MG: 40 INJECTION, POWDER, FOR SOLUTION INTRAVENOUS at 10:06

## 2019-11-12 RX ADMIN — PROCHLORPERAZINE EDISYLATE 10 MG: 5 INJECTION INTRAMUSCULAR; INTRAVENOUS at 05:21

## 2019-11-12 RX ADMIN — POTASSIUM CHLORIDE 20 MEQ: 7.46 INJECTION, SOLUTION INTRAVENOUS at 15:02

## 2019-11-12 RX ADMIN — Medication: at 09:12

## 2019-11-12 RX ADMIN — POTASSIUM PHOSPHATE, MONOBASIC AND POTASSIUM PHOSPHATE, DIBASIC 25 MMOL: 224; 236 INJECTION, SOLUTION INTRAVENOUS at 19:34

## 2019-11-12 RX ADMIN — PROCHLORPERAZINE EDISYLATE 10 MG: 5 INJECTION INTRAMUSCULAR; INTRAVENOUS at 10:07

## 2019-11-12 RX ADMIN — CEFEPIME HYDROCHLORIDE 2 G: 2 INJECTION, POWDER, FOR SOLUTION INTRAVENOUS at 17:31

## 2019-11-12 RX ADMIN — PROCHLORPERAZINE EDISYLATE 10 MG: 5 INJECTION INTRAMUSCULAR; INTRAVENOUS at 21:44

## 2019-11-12 RX ADMIN — PROCHLORPERAZINE EDISYLATE 10 MG: 5 INJECTION INTRAMUSCULAR; INTRAVENOUS at 16:51

## 2019-11-12 RX ADMIN — CALCIUM CHLORIDE 780 MG: 100 INJECTION INTRAVENOUS; INTRAVENTRICULAR at 06:39

## 2019-11-12 RX ADMIN — CEFEPIME HYDROCHLORIDE 2 G: 2 INJECTION, POWDER, FOR SOLUTION INTRAVENOUS at 10:19

## 2019-11-12 RX ADMIN — SULFAMETHOXAZOLE AND TRIMETHOPRIM 1 TABLET: 800; 160 TABLET ORAL at 20:09

## 2019-11-12 RX ADMIN — SULFAMETHOXAZOLE AND TRIMETHOPRIM 1 TABLET: 800; 160 TABLET ORAL at 08:54

## 2019-11-12 RX ADMIN — METRONIDAZOLE 500 MG: 500 INJECTION, SOLUTION INTRAVENOUS at 03:37

## 2019-11-12 RX ADMIN — CEFEPIME HYDROCHLORIDE 2 G: 2 INJECTION, POWDER, FOR SOLUTION INTRAVENOUS at 02:44

## 2019-11-12 RX ADMIN — ALTEPLASE 2 MG: 2.2 INJECTION, POWDER, LYOPHILIZED, FOR SOLUTION INTRAVENOUS at 09:13

## 2019-11-12 RX ADMIN — POTASSIUM CHLORIDE 20 MEQ: 7.46 INJECTION, SOLUTION INTRAVENOUS at 13:41

## 2019-11-12 RX ADMIN — POTASSIUM PHOSPHATE, MONOBASIC AND POTASSIUM PHOSPHATE, DIBASIC 20 MMOL: 224; 236 INJECTION, SOLUTION INTRAVENOUS at 07:49

## 2019-11-12 RX ADMIN — FUROSEMIDE 10 MG: 10 INJECTION, SOLUTION INTRAMUSCULAR; INTRAVENOUS at 20:09

## 2019-11-12 RX ADMIN — Medication 0.2 ML: at 09:30

## 2019-11-12 RX ADMIN — POLYETHYLENE GLYCOL 3350 17 G: 17 POWDER, FOR SOLUTION ORAL at 10:19

## 2019-11-12 RX ADMIN — ACETAMINOPHEN 650 MG: 325 TABLET, FILM COATED ORAL at 02:05

## 2019-11-12 RX ADMIN — ENOXAPARIN SODIUM 80 MG: 80 INJECTION SUBCUTANEOUS at 21:44

## 2019-11-12 RX ADMIN — Medication 0.2 ML: at 09:36

## 2019-11-12 RX ADMIN — FUROSEMIDE 20 MG: 10 INJECTION, SOLUTION INTRAMUSCULAR; INTRAVENOUS at 13:17

## 2019-11-12 RX ADMIN — ACETAMINOPHEN 650 MG: 325 TABLET, FILM COATED ORAL at 14:51

## 2019-11-12 RX ADMIN — MELATONIN TAB 3 MG 3 MG: 3 TAB at 21:44

## 2019-11-12 ASSESSMENT — MIFFLIN-ST. JEOR: SCORE: 1853.51

## 2019-11-12 NOTE — PLAN OF CARE
VSS, tmax 100.1 axillary. Pt given zofran X1 for nausea, pt reported pain controlled with PCA morphine pump, pt sleeping most of shift appropriate when asked questions cooperative with cares. Pt weaned of Levophed this shift, blood pressures remained WDL, KCl replaced X3 this shift. NJ placed currently in stomach, MD verified tube in stomach not intestine, plan to begin feeds this evening in stomach. Bumex drip stopped, pt voiding appropriately. Mother at bedside, updated on POC.

## 2019-11-12 NOTE — PLAN OF CARE
Afebrile. VSS. Increased PCA pump dose to 0.6 to better control pain overnight. Intermittent pain throughout night. Tolerating NG feeds well with no nausea/vomiting or increased pain. CaCl, KCl, and phos replaced. Port de-accessed and accessed again due to port not drawing with TPA. Mom at bedside all shift and updated on POC. Will continue to monitor.

## 2019-11-12 NOTE — PROGRESS NOTES
Pediatric Vascular Access  RE: Occluded Port-a-cath    Called to the bedside to asssist with trouble shooting a Port-a-cath occlusion. Bedside RN reports sudden occlusion during TNP infusing overnight per night RN. Unclear if able to instill tPA, but able to re-establish blood return with de-access and re-access at that time. RN reports infusing pump alarming 'increased pressure' with noted loss of blood return again this am.    Unable to flush Port with pt repositioning, arm elevation or other typical trouble shooting techniques. Possible sediment noted in line. Incompatible medication causing sediment unlikely- verified per PICU pharmacist.    Alteplase instilled X1 using 3 way stopcock technique and allowed to dwell for 1 hour. Unable to remove alteplase after dwell time. MD notified. Recommend IR evaluation moving forward.    Patient tolerated procedure well. Mother, grandmother and bedside RN present throughout. All questions answered. VAS will follow up as requested.

## 2019-11-12 NOTE — PROGRESS NOTES
[facility], [place]    CONSULT Note - Pediatric Hematology Oncology Service        Date of Admission:  11/10/2019    Assessment & Plan   Lazaro Lund is a 19 year old male admitted on 11/07/19 for intractable nausea, abdominal pain and severe dehydration. 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 He was due to start Cycle 1 day 29 but this was delayed due to his presenting symptoms. Lazaro remains admitted for severe asparaginase associated pancreatitis with elevated lipase, amylase and significant abdominal pain and nausea and now with imaging consistent with pancreatic necrosis and SIRS requiring transfer to the PICU. Currently on cefepime, metronidazole since 11/10 for empiric coverage due to intermittent fevers.     Today:  - Remains in PICU on CPAP, has weaned off of Bumex gtt and Norepi  - Recheck Hep10a level at 1300 on 11/12  - Close monitoring of I/Os. Net -2.7L yesterday   - Pleural effusion R>L    GI  Asparaginase associated pancreatitis - Lipase >3xULN, significant upper abdominal pain and intractable vomiting.  - IV protonix 40mg daily  - TPN and tube feeds started 11/11 - titrating up to meet goal  - GI consulting appreciate recs  - Morphine PCA for pain management  - Monitor serial CMP  - CT abdomen per GI on 11/10 with 3 cm area of pancreatic necrosis, GI recommending repeat scan in 2 weeks  - Cefepime, metronidazole started on 11/10 due to fever     HEME/ONC  T cell lymphoblastic lymphoma - VHR - treated per protocol [id number], due for D29 of consolidation [id number] Consolidation. Not neutropenic on admission   - Cycle 1 D29 delayed until at least 11/14     DVT of the bilateral upper extremities  -Subtherapeutic level - Increased Lovenox from 60mg q12h to 70mg q12h on 11/11  -Retime hep Xa level 4 hours after the next dose (11/12 @ 1300).      PCP ppx  -Bactrim QM/T (may need to hold next week  depending on persistent of nausea and vomiting)     Nausea  -Zofran available PRN as QTc was not actually significantly prolonged when EKG reviewed by cardiologist   -Compazine Q6 scheduled  -Ativan 1-2 mg 6h PRN   -Benadryl PRN  -Scopolamine patch in place     At risk for chemotherapy-induced cytopenias  - maintain hgb > 7  - maintain plt count > 30 d/t lovenox therapy     FEN:  Severe malnutrition as evidenced by < 50% of necessary intake for > 5 days, moderate fluid accumulation, and > 2% weight loss in < 1 week  Dehydration due to poor oral intake  Capillary Leak   -s/p Norepi gtt  -Stict I/O  -NG tube feeds started 11/11 - tolerating, working up to goal  -PTA vitamin D     H/O constipation - at risk of opioid induced constipation  -Miralax daily PRN  -Senokot daily PRN     NEURO:  Abdominal pain 2/2 pancreatitis  -Morphine PCA  -Tylenol Q6  -PTA Melatonin    CV:  EKG obtained 11/8 for elevated HR, noted to have prolonged QTC of 478 that increased to 528 on 11/10 initially, but then on overread was noted to be 437 so not truly prolonged    RESP:   Pleural efffusions 2/2 volume overload and capillary leak  - CPAP and O2 as needed     DVT Prophylaxis: Low Risk/Ambulatory with no additional VTE prophylaxis indicated given Enoxaparin (Lovenox) subcutaneous treatment  Lara Catheter: not present  Code Status: Full    Patient was discussed with attending, Dr. Reynaldo Campuzano.     Donna Barillas MD MPH  Pediatric Heme/Onc & BMT Fellow  Pager: [phone]    Physician Attestation    I saw and evaluated the patient. I discussed with the fellow and agree with the findings and plan as documented in the fellow's note.     Reynaldo Campuzano MD  Pediatric Hematology/Oncology  [facility]  Date of Service (when I saw the patient): 11/12/2019      Interval History    Ovenight, Lazaro remained stable off of his norepi drip and IVF with Bumex gtt. His abdominal pain is well managed and  continues on the Morphine PCA. He remained stable on CPAP overnight. He was started on NG feeds on 11/11 which he has been tolerating thus far.    Data reviewed today: I reviewed all medications, new labs and imaging results over the last 24 hours.     Physical Exam   Vital Signs: Temp: 98.4  F (36.9  C) Temp src: Axillary BP: 112/57 Pulse: 87 Heart Rate: 84 Resp: 28 SpO2: 100 % O2 Device: BiPAP/CPAP    Weight: 182 lbs 15.71 oz    EXAM:  GENERAL: lying in bed, sleeping comfortably  SKIN: Clear. No significant rash, abnormal pigmentation or lesions  HEAD: Normocephalic  EYES: eyes closed  EARS: Normal external canals.  NOSE: Normal without discharge.  LUNGS: CTAB, non labored breaths, BiPAP mask in place  HEART: Regular rhythm. Normal S1/S2. No murmurs. Normal pulses.  ABDOMEN: Soft, flat, non-distended.  NEUROLOGIC: sleeping, shifted a few times     Data   Recent Labs   Lab 11/12/19  0454 11/11/19  1657 11/11/19  1319 11/11/19  0411 11/10/19  2043 11/10/19  1708  11/10/19  0222  11/09/19  0630   WBC  --   --   --  7.6  --  8.6  --  10.9  --  9.5   HGB  --   --   --  8.1*  --  8.7*  --  9.8*  --  12.6*   MCV  --   --   --  82  --  82  --  84  --  81   PLT  --   --   --  223  --  296  --  254  --  383   INR 1.48*  --   --  1.57* 1.61*  --   --  1.82*   < >  --     138  --  138  --   --    < > 135   < > 138   POTASSIUM 2.9* 3.3* 3.0* 3.3*  --   --    < > 4.0   < > 4.2   CHLORIDE 106 100  --  105  --   --    < > 104   < > 106   CO2 32 34*  --  29  --   --    < > 23   < > 25   BUN 13 11  --  11  --   --    < > 22   < > 17   CR 0.50* 0.51*  --  0.52*  --   --    < > 0.88   < > 0.70   ANIONGAP 2* 4  --  4  --   --    < > 8   < > 7   MICHAEL 5.9* 6.2*  --  6.3*  --   --    < > 6.5*   < > 7.5*   * 101*  --  82  --   --    < > 104*   < > 114*   ALBUMIN 1.8*  --   --  2.1*  --   --    < > 2.3*   < > 2.8*   PROTTOTAL 3.3*  --   --  3.6*  --   --   --  3.8*  --   --    BILITOTAL 1.0  --   --  1.3  --   --   --  0.5  --    --    ALKPHOS 115  --   --  95  --   --   --  72  --   --    ALT 42  --   --  58  --   --   --  58  --   --    AST 27  --   --  51*  --   --   --  40  --   --    LIPASE  --   --   --  1,239*  --   --   --   --   --  4,112*    < > = values in this interval not displayed.     Recent Results (from the past 24 hour(s))   XR Abdomen Port 1 View    Narrative    EXAM: XR ABDOMEN PORT 1 VW  11/11/2019 3:22 PM     HISTORY:  NJ placement       COMPARISON:  CT 11/10/2019    FINDINGS:   Portable supine view of the abdomen. Feeding tube is looped with the  tip projecting over the expected location of the stomach. Gas-filled  loops of nondilated bowel.       Impression    IMPRESSION:   Feeding tube tip projects over the stomach. Wire remains within the  tube.    Findings discussed with Dr. Patterson at 1530 hours.     I have personally reviewed the examination and initial interpretation  and I agree with the findings.    FATOUMATA PINK MD   XR Abdomen Port 1 View    Narrative    Examination:  XR ABDOMEN PORT 1 VW 11/11/2019 9:49 PM     Comparison: X-ray 11/11/2019    History: NG palcement    Findings: Supine radiograph of the abdomen. Partially visualized  central venous catheter with the tip projecting over the cavoatrial  junction. The feeding tube tip projects over the stomach with the wire  now removed.  Gas-filled loops of nondilated bowel. No pneumatosis or  portal venous gas is seen in the visualized abdomen. Asymmetric hazy  and confluent bibasilar opacities.      Impression    Impression:   1. Nonobstructive bowel gas pattern with feeding tube over the  stomach.  2. Asymmetric bibasilar atelectasis.    I have personally reviewed the examination and initial interpretation  and I agree with the findings.    SHENA ORTIZ MD     Medications     IV fluid REPLACEMENT ONLY       morphine       norepinephrine Stopped (11/11/19 1352)     parenteral nutrition - ADULT compounded formula 45 mL/hr at 11/11/19 1946     - MEDICATION  INSTRUCTIONS -         acetaminophen  650 mg Oral Q6H     alteplase  2 mg Intravenous Q2H     ceFEPIme (MAXIPIME) IV  2 g Intravenous Q8H     enoxaparin ANTICOAGULANT  80 mg Subcutaneous Q12H     lipids  120 mL Intravenous Q12H     melatonin  3 mg Oral At Bedtime     metroNIDAZOLE  500 mg Intravenous Q8H     pantoprazole (PROTONIX) IV  40 mg Intravenous Daily with breakfast     potassium phosphate  20 mmol Intravenous Once     prochlorperazine  10 mg Intravenous Q6H     scopolamine  1 patch Transdermal Q72H     scopolamine   Transdermal Q8H     scopolamine   Transdermal Q72H     sodium chloride (PF)  3 mL Intracatheter Q8H     sulfamethoxazole-trimethoprim  1 tablet Oral Q Mon Tues BID     Vitamin D3  2,000 Units Oral Daily

## 2019-11-12 NOTE — PROGRESS NOTES
[facility]  Pediatric Gastroenterology Follow-up Consultation Note    Date of Service (when I saw the patient): 11/12/2019     Assessment & Plan   Lazaro Lund is a 19 year old male who was admitted on 11/7/2019 for intractable nausea, abdominal pain and severe dehydration. 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  He has developed asparaginase-associated pancreatitis (AAP) with significant abdominal pain and nausea.  This has been complicated by development of a 3cm foci of mid-body pancreatic necrosis as seen on CT from 11/10 (with concern for additional areas of punctate necrosis); he has also developed pleural effusions and required norepi due to persistent hypotension.  Norepi has currently been weaned off with stable BPs.  Enteral feeds started with NG on 11/11 and tolerating thus far.  PN to meet remainder of needs currently while feeds advance.  Off Bumex and continues to void.  Monitoring closely.  Given fevers on 11/10, had been started on cefepime and flagyl.    Recommendations--  -Continue fluids / feeds to aim for at least 0.5-1 mL/kg/hr in UOP.  -Continue pain management with PCA.  -Continue anti-emetics.  -Continue NG feeds with advancement as tolerated to goal (appreciate RD recs) and wean off PN.  -Okay to continue antibiotics given fevers and clinical instability, but I do not believe he currently needs these for his necrotizing pancreatitis; we do not have evidence currently of infected necrosis and antibiotic prophylaxis does not have proven benefits.  -Continue GI ppx with IV protonix 40mg daily.  -Case discussed with both pediatric surgery (Dr Luevano) as well as the adult panc/bili team (Dr Coronel) in the event that Lazaro would need intervention (if necrosis is enlarging and causing symptoms or appears infected).    -Recommendation to  repeat advanced cross sectional imaging in 2wks.  He should follow up with Dr Coronel in 4wks.  He will need chronic monitoring for endocrine / exocrine insufficiency.    We will continue to follow along with you.  Please do not hesitate to contact us with any additional questions or concerns.    Shannon Aguilar MD MPH    Pediatric Gastroenterology, Hepatology, and Nutrition  [facility]      Interval History   Tolerating CPAP.  NG feeds started.  Continues on vanc and cefepime.    On PCA.      Physical Exam   Temp: 98.3  F (36.8  C) Temp src: Axillary BP: 104/59 Pulse: 88 Heart Rate: 90 Resp: 18 SpO2: 100 % O2 Device: BiPAP/CPAP    Vitals:    11/07/19 1600 11/09/19 1943 11/10/19 0800   Weight: 73.5 kg (162 lb 0.6 oz) 80.1 kg (176 lb 9.4 oz) 83 kg (182 lb 15.7 oz)     Vital Signs with Ranges  Temp:  [98  F (36.7  C)-101  F (38.3  C)] 98.3  F (36.8  C)  Pulse:  [] 88  Heart Rate:  [] 90  Resp:  [10-36] 18  BP: ()/(51-72) 104/59  FiO2 (%):  [25 %] 25 %  SpO2:  [93 %-100 %] 100 %  I/O last 3 completed shifts:  In: 2481.98 [P.O.:50; I.V.:1964.31]  Out: 5522 [Urine:5520; Blood:2]    General: sleeping comfortably and opens eyes briefly with exam  HEENT: normocephalic, atraumatic; nares clear; moist mucous membranes  CV: tachycardic, regular rhythm, no murmurs  Resp: lungs clear to auscultation bilaterally, normal respiratory effort on room air, maintaining O2 sats at 98% without supplemental O2 currently  Abd: soft, mildly tender to light palpation, non-distended  Neuro: sleeping comfortably, mild flinching with abdominal exam    Medications     IV fluid REPLACEMENT ONLY       morphine       norepinephrine Stopped (11/11/19 8848)     parenteral nutrition - ADULT compounded formula       parenteral nutrition - ADULT compounded formula Stopped (11/12/19 9493)     - MEDICATION INSTRUCTIONS -         sodium chloride         acetaminophen  650 mg  Oral Q6H     alteplase  2 mg Intravenous Q2H     ceFEPIme (MAXIPIME) IV  2 g Intravenous Q8H     enoxaparin ANTICOAGULANT  80 mg Subcutaneous Q12H     lipids  120 mL Intravenous Q12H     melatonin  3 mg Oral At Bedtime     metroNIDAZOLE  500 mg Intravenous Q8H     pantoprazole (PROTONIX) IV  40 mg Intravenous Daily with breakfast     polyethylene glycol  17 g Oral Daily     potassium phosphate  20 mmol Intravenous Once     prochlorperazine  10 mg Intravenous Q6H     scopolamine  1 patch Transdermal Q72H     scopolamine   Transdermal Q8H     scopolamine   Transdermal Q72H     sodium chloride (PF)  3 mL Intracatheter Q8H     sulfamethoxazole-trimethoprim  1 tablet Oral Q Mon Tues BID     Vitamin D3  2,000 Units Oral Daily       Data   Results for orders placed or performed during the hospital encounter of 11/07/19 (from the past 24 hour(s))   Glucose by meter   Result Value Ref Range    Glucose 87 70 - 99 mg/dL   Heparin 10a Level   Result Value Ref Range    Heparin 10A Level 0.22 IU/mL   Blood gas venous with oxyhemoglobin   Result Value Ref Range    Ph Venous 7.46 (H) 7.32 - 7.43 pH    PCO2 Venous 51 (H) 40 - 50 mm Hg    PO2 Venous 36 25 - 47 mm Hg    Bicarbonate Venous 36 (H) 21 - 28 mmol/L    FIO2 25     Oxyhemoglobin Venous 69 %    Base Excess Venous 10.5 mmol/L   Potassium whole blood   Result Value Ref Range    Potassium 3.0 (L) 3.4 - 5.3 mmol/L   Lactic acid whole blood   Result Value Ref Range    Lactic Acid 0.4 (L) 0.7 - 2.0 mmol/L   XR Abdomen Port 1 View    Narrative    EXAM: XR ABDOMEN PORT 1 VW  11/11/2019 3:22 PM     HISTORY:  NJ placement       COMPARISON:  CT 11/10/2019    FINDINGS:   Portable supine view of the abdomen. Feeding tube is looped with the  tip projecting over the expected location of the stomach. Gas-filled  loops of nondilated bowel.       Impression    IMPRESSION:   Feeding tube tip projects over the stomach. Wire remains within the  tube.    Findings discussed with Dr. Patterson at 9731  hours.     I have personally reviewed the examination and initial interpretation  and I agree with the findings.    FATOUMATA PINK MD   Calcium ionized whole blood   Result Value Ref Range    Calcium Ionized Whole Blood 3.9 (L) 4.4 - 5.2 mg/dL   Phosphorus   Result Value Ref Range    Phosphorus 2.6 2.5 - 4.5 mg/dL   Magnesium   Result Value Ref Range    Magnesium 1.8 1.6 - 2.3 mg/dL   Basic metabolic panel   Result Value Ref Range    Sodium 138 133 - 144 mmol/L    Potassium 3.3 (L) 3.4 - 5.3 mmol/L    Chloride 100 94 - 109 mmol/L    Carbon Dioxide 34 (H) 20 - 32 mmol/L    Anion Gap 4 3 - 14 mmol/L    Glucose 101 (H) 70 - 99 mg/dL    Urea Nitrogen 11 7 - 30 mg/dL    Creatinine 0.51 (L) 0.66 - 1.25 mg/dL    GFR Estimate >90 >60 mL/min/[1.73_m2]    GFR Estimate If Black >90 >60 mL/min/[1.73_m2]    Calcium 6.2 (L) 8.5 - 10.1 mg/dL   XR Abdomen Port 1 View    Narrative    Examination:  XR ABDOMEN PORT 1 VW 11/11/2019 9:49 PM     Comparison: X-ray 11/11/2019    History: NG palcement    Findings: Supine radiograph of the abdomen. Partially visualized  central venous catheter with the tip projecting over the cavoatrial  junction. The feeding tube tip projects over the stomach with the wire  now removed.  Gas-filled loops of nondilated bowel. No pneumatosis or  portal venous gas is seen in the visualized abdomen. Asymmetric hazy  and confluent bibasilar opacities.      Impression    Impression:   1. Nonobstructive bowel gas pattern with feeding tube over the  stomach.  2. Asymmetric bibasilar atelectasis.    I have personally reviewed the examination and initial interpretation  and I agree with the findings.    SHENA ORTIZ MD   Heparin 10a Level   Result Value Ref Range    Heparin 10A Level <0.10 IU/mL   Comprehensive metabolic panel   Result Value Ref Range    Sodium 140 133 - 144 mmol/L    Potassium 2.9 (L) 3.4 - 5.3 mmol/L    Chloride 106 94 - 109 mmol/L    Carbon Dioxide 32 20 - 32 mmol/L    Anion Gap 2 (L) 3 - 14  mmol/L    Glucose 130 (H) 70 - 99 mg/dL    Urea Nitrogen 13 7 - 30 mg/dL    Creatinine 0.50 (L) 0.66 - 1.25 mg/dL    GFR Estimate >90 >60 mL/min/[1.73_m2]    GFR Estimate If Black >90 >60 mL/min/[1.73_m2]    Calcium 5.9 (LL) 8.5 - 10.1 mg/dL    Bilirubin Total 1.0 0.2 - 1.3 mg/dL    Albumin 1.8 (L) 3.4 - 5.0 g/dL    Protein Total 3.3 (L) 6.8 - 8.8 g/dL    Alkaline Phosphatase 115 40 - 150 U/L    ALT 42 0 - 70 U/L    AST 27 0 - 45 U/L   Magnesium   Result Value Ref Range    Magnesium 1.9 1.6 - 2.3 mg/dL   Phosphorus   Result Value Ref Range    Phosphorus 1.6 (L) 2.5 - 4.5 mg/dL   INR   Result Value Ref Range    INR 1.48 (H) 0.86 - 1.14   Prealbumin   Result Value Ref Range    Prealbumin <3 (L) 15 - 45 mg/dL   Bilirubin direct   Result Value Ref Range    Bilirubin Direct 0.5 (H) 0.0 - 0.2 mg/dL   Calcium ionized whole blood   Result Value Ref Range    Calcium Ionized Whole Blood 3.9 (L) 4.4 - 5.2 mg/dL   XR Chest Port 1 View    Addendum: 11/12/2019    A new radiograph has been added. Portable supine view of the chest at  0822 hours. Feeding tube projects over the stomach. Port-A-Cath tip  projects over the superior atrial caval junction. The cardiac  silhouette size is normal. There are moderate bilateral pleural  effusions. Perihilar and basilar opacities are similar to yesterday's  examination.    FATOUMATA PINK MD      Narrative    EXAM: XR CHEST PORT 1 VW  11/12/2019 5:59 AM     HISTORY:  Follow up pulmonary edema and pleural effusions       COMPARISON:  11/11/2019    FINDINGS:   2 portable supine views of the chest. Enteric tube courses along the  midline inferiorly outside the field-of-view. Right Port-A-Cath tip  projects over the mid/low SVC.    Cardiomediastinal silhouette is within normal limits. Stable bilateral  pleural effusions. Evaluation limited secondary to the costophrenic  angles correlated from the field-of-view.    Stable perihilar and bibasilar atelectasis.       Impression    IMPRESSION:    Continued moderate pleural effusions, perihilar edema, and basilar  atelectasis.    I have personally reviewed the examination and initial interpretation  and I agree with the findings.    FATOUMATA PINK MD

## 2019-11-12 NOTE — PROCEDURES
Patient with T-cell lymphoma status post single lumen chest port placement 10/24/2019 now with difficulty aspirating and flushing so IR was consulted for port check.    Patient presents to IR with port currently accessed.  Fluoroscopy shows the port is adequately accessed, however, the port does not aspirate or flush easily.  The needle was removed and exchanged for a new 20-gauge 1 inch Johns access needle.  There was brisk blood return on aspiration and the port flushed easily. Port ready for immediate use. Return to IR as needed.    Performed by Dr. Escamilla    Sedation per PICU (Ativan)    Fluoroscopy times less than 1 minute    Nitesh Gray PA-C  Interventional Radiology  [phone]

## 2019-11-12 NOTE — CONSULTS
INTERVENTIONAL RADIOLOGY CONSULT    Lazaro Lund is a 19 year old male with T-cell lymphoma s/p single lumen chest port placement 10/24/19 now with malfunction of the port despite multiple accesses and tPA lock. Vascular access has assessed and was unable to get the port to function. IR has been consulted for port check (dye study).    Patient is on IR schedule today for a right chest port check.    Discussed with Lew Patterson MD.    Nitesh Gray PA-C  Interventional Radiology  [phone] (daytime IR pager)

## 2019-11-12 NOTE — PLAN OF CARE
PT Cancel: PT orders received and acknowledged. Contacting RN in PM after orders received but patient is going to OR for port check under xray. Will reschedule evaluation for tomorrow. Thank you for this referral!    Traci Mendez, PT, DPT

## 2019-11-13 ENCOUNTER — APPOINTMENT (OUTPATIENT)
Dept: PHYSICAL THERAPY | Facility: CLINIC | Age: 19
End: 2019-11-13
Attending: STUDENT IN AN ORGANIZED HEALTH CARE EDUCATION/TRAINING PROGRAM
Payer: MEDICAID

## 2019-11-13 ENCOUNTER — APPOINTMENT (OUTPATIENT)
Dept: ULTRASOUND IMAGING | Facility: CLINIC | Age: 19
End: 2019-11-13
Attending: PEDIATRICS
Payer: MEDICAID

## 2019-11-13 LAB
ANION GAP SERPL CALCULATED.3IONS-SCNC: 3 MMOL/L (ref 3–14)
ANION GAP SERPL CALCULATED.3IONS-SCNC: 3 MMOL/L (ref 3–14)
AT III ACT/NOR PPP CHRO: 30 % (ref 85–135)
BASE EXCESS BLDV CALC-SCNC: 7 MMOL/L
BASOPHILS # BLD AUTO: 0 10E9/L (ref 0–0.2)
BASOPHILS NFR BLD AUTO: 0.1 %
BUN SERPL-MCNC: 12 MG/DL (ref 7–30)
CA-I BLD-MCNC: 4 MG/DL (ref 4.4–5.2)
CA-I BLD-MCNC: 4.5 MG/DL (ref 4.4–5.2)
CALCIUM SERPL-MCNC: 6.4 MG/DL (ref 8.5–10.1)
CHLORIDE SERPL-SCNC: 103 MMOL/L (ref 94–109)
CHLORIDE SERPL-SCNC: 104 MMOL/L (ref 94–109)
CO2 SERPL-SCNC: 31 MMOL/L (ref 20–32)
CO2 SERPL-SCNC: 32 MMOL/L (ref 20–32)
CREAT SERPL-MCNC: 0.51 MG/DL (ref 0.66–1.25)
DIFFERENTIAL METHOD BLD: ABNORMAL
EOSINOPHIL # BLD AUTO: 0 10E9/L (ref 0–0.7)
EOSINOPHIL NFR BLD AUTO: 0 %
ERYTHROCYTE [DISTWIDTH] IN BLOOD BY AUTOMATED COUNT: 23 % (ref 10–15)
GFR SERPL CREATININE-BSD FRML MDRD: >90 ML/MIN/{1.73_M2}
GLUCOSE SERPL-MCNC: 102 MG/DL (ref 70–99)
HCO3 BLDV-SCNC: 31 MMOL/L (ref 21–28)
HCT VFR BLD AUTO: 23.3 % (ref 40–53)
HGB BLD-MCNC: 7.8 G/DL (ref 13.3–17.7)
IMM GRANULOCYTES # BLD: 0.1 10E9/L (ref 0–0.4)
IMM GRANULOCYTES NFR BLD: 0.9 %
LMWH PPP CHRO-ACNC: 0.29 IU/ML
LYMPHOCYTES # BLD AUTO: 0.5 10E9/L (ref 0.8–5.3)
LYMPHOCYTES NFR BLD AUTO: 5.3 %
MAGNESIUM SERPL-MCNC: 1.8 MG/DL (ref 1.6–2.3)
MAGNESIUM SERPL-MCNC: 1.8 MG/DL (ref 1.6–2.3)
MCH RBC QN AUTO: 28.6 PG (ref 26.5–33)
MCHC RBC AUTO-ENTMCNC: 33.5 G/DL (ref 31.5–36.5)
MCV RBC AUTO: 85 FL (ref 78–100)
MONOCYTES # BLD AUTO: 1.5 10E9/L (ref 0–1.3)
MONOCYTES NFR BLD AUTO: 15.2 %
NEUTROPHILS # BLD AUTO: 7.5 10E9/L (ref 1.6–8.3)
NEUTROPHILS NFR BLD AUTO: 78.5 %
NRBC # BLD AUTO: 0 10*3/UL
NRBC BLD AUTO-RTO: 0 /100
O2/TOTAL GAS SETTING VFR VENT: 21 %
PCO2 BLDV: 43 MM HG (ref 40–50)
PH BLDV: 7.47 PH (ref 7.32–7.43)
PHOSPHATE SERPL-MCNC: 1 MG/DL (ref 2.5–4.5)
PHOSPHATE SERPL-MCNC: 1.5 MG/DL (ref 2.5–4.5)
PLATELET # BLD AUTO: 234 10E9/L (ref 150–450)
PO2 BLDV: 36 MM HG (ref 25–47)
POTASSIUM SERPL-SCNC: 3.4 MMOL/L (ref 3.4–5.3)
POTASSIUM SERPL-SCNC: 3.9 MMOL/L (ref 3.4–5.3)
POTASSIUM SERPL-SCNC: 4.1 MMOL/L (ref 3.4–5.3)
RBC # BLD AUTO: 2.73 10E12/L (ref 4.4–5.9)
SODIUM SERPL-SCNC: 137 MMOL/L (ref 133–144)
SODIUM SERPL-SCNC: 139 MMOL/L (ref 133–144)
WBC # BLD AUTO: 9.6 10E9/L (ref 4–11)

## 2019-11-13 PROCEDURE — 25000132 ZZH RX MED GY IP 250 OP 250 PS 637: Performed by: STUDENT IN AN ORGANIZED HEALTH CARE EDUCATION/TRAINING PROGRAM

## 2019-11-13 PROCEDURE — 25800030 ZZH RX IP 258 OP 636: Performed by: STUDENT IN AN ORGANIZED HEALTH CARE EDUCATION/TRAINING PROGRAM

## 2019-11-13 PROCEDURE — 84132 ASSAY OF SERUM POTASSIUM: CPT | Performed by: PEDIATRICS

## 2019-11-13 PROCEDURE — 84100 ASSAY OF PHOSPHORUS: CPT | Performed by: STUDENT IN AN ORGANIZED HEALTH CARE EDUCATION/TRAINING PROGRAM

## 2019-11-13 PROCEDURE — 80048 BASIC METABOLIC PNL TOTAL CA: CPT | Performed by: STUDENT IN AN ORGANIZED HEALTH CARE EDUCATION/TRAINING PROGRAM

## 2019-11-13 PROCEDURE — 82330 ASSAY OF CALCIUM: CPT | Performed by: PEDIATRICS

## 2019-11-13 PROCEDURE — 25000125 ZZHC RX 250: Performed by: STUDENT IN AN ORGANIZED HEALTH CARE EDUCATION/TRAINING PROGRAM

## 2019-11-13 PROCEDURE — 25000128 H RX IP 250 OP 636: Performed by: STUDENT IN AN ORGANIZED HEALTH CARE EDUCATION/TRAINING PROGRAM

## 2019-11-13 PROCEDURE — 20300000 ZZH R&B PICU UMMC

## 2019-11-13 PROCEDURE — 97110 THERAPEUTIC EXERCISES: CPT | Mod: GP | Performed by: PHYSICAL THERAPIST

## 2019-11-13 PROCEDURE — 85520 HEPARIN ASSAY: CPT | Performed by: STUDENT IN AN ORGANIZED HEALTH CARE EDUCATION/TRAINING PROGRAM

## 2019-11-13 PROCEDURE — 25000128 H RX IP 250 OP 636: Performed by: PEDIATRICS

## 2019-11-13 PROCEDURE — 85025 COMPLETE CBC W/AUTO DIFF WBC: CPT | Performed by: STUDENT IN AN ORGANIZED HEALTH CARE EDUCATION/TRAINING PROGRAM

## 2019-11-13 PROCEDURE — 40000275 ZZH STATISTIC RCP TIME EA 10 MIN

## 2019-11-13 PROCEDURE — 82803 BLOOD GASES ANY COMBINATION: CPT | Performed by: PEDIATRICS

## 2019-11-13 PROCEDURE — 97162 PT EVAL MOD COMPLEX 30 MIN: CPT | Mod: GP | Performed by: PHYSICAL THERAPIST

## 2019-11-13 PROCEDURE — 82330 ASSAY OF CALCIUM: CPT | Performed by: STUDENT IN AN ORGANIZED HEALTH CARE EDUCATION/TRAINING PROGRAM

## 2019-11-13 PROCEDURE — C9113 INJ PANTOPRAZOLE SODIUM, VIA: HCPCS | Performed by: STUDENT IN AN ORGANIZED HEALTH CARE EDUCATION/TRAINING PROGRAM

## 2019-11-13 PROCEDURE — 83735 ASSAY OF MAGNESIUM: CPT | Performed by: STUDENT IN AN ORGANIZED HEALTH CARE EDUCATION/TRAINING PROGRAM

## 2019-11-13 PROCEDURE — 94660 CPAP INITIATION&MGMT: CPT

## 2019-11-13 PROCEDURE — 85300 ANTITHROMBIN III ACTIVITY: CPT | Performed by: STUDENT IN AN ORGANIZED HEALTH CARE EDUCATION/TRAINING PROGRAM

## 2019-11-13 PROCEDURE — 25000125 ZZHC RX 250

## 2019-11-13 PROCEDURE — 25000128 H RX IP 250 OP 636

## 2019-11-13 PROCEDURE — 80051 ELECTROLYTE PANEL: CPT | Performed by: STUDENT IN AN ORGANIZED HEALTH CARE EDUCATION/TRAINING PROGRAM

## 2019-11-13 PROCEDURE — 27210436 ZZH NUTRITION PRODUCT SEMIELEM INTERMED CAN

## 2019-11-13 PROCEDURE — 93971 EXTREMITY STUDY: CPT | Mod: RT

## 2019-11-13 RX ORDER — HEPARIN SODIUM,PORCINE 10 UNIT/ML
3-6 VIAL (ML) INTRAVENOUS EVERY 24 HOURS
Status: DISCONTINUED | OUTPATIENT
Start: 2019-11-13 | End: 2019-11-17 | Stop reason: HOSPADM

## 2019-11-13 RX ORDER — FUROSEMIDE 10 MG/ML
10 INJECTION INTRAMUSCULAR; INTRAVENOUS ONCE
Status: COMPLETED | OUTPATIENT
Start: 2019-11-13 | End: 2019-11-13

## 2019-11-13 RX ORDER — SODIUM CHLORIDE 9 MG/ML
INJECTION, SOLUTION INTRAVENOUS
Status: DISCONTINUED
Start: 2019-11-13 | End: 2019-11-13 | Stop reason: HOSPADM

## 2019-11-13 RX ORDER — HEPARIN SODIUM,PORCINE 10 UNIT/ML
3-6 VIAL (ML) INTRAVENOUS
Status: DISCONTINUED | OUTPATIENT
Start: 2019-11-13 | End: 2019-11-17 | Stop reason: HOSPADM

## 2019-11-13 RX ORDER — HEPARIN SODIUM (PORCINE) LOCK FLUSH IV SOLN 100 UNIT/ML 100 UNIT/ML
5 SOLUTION INTRAVENOUS
Status: DISCONTINUED | OUTPATIENT
Start: 2019-11-13 | End: 2019-11-17 | Stop reason: HOSPADM

## 2019-11-13 RX ORDER — FUROSEMIDE 10 MG/ML
10 INJECTION INTRAMUSCULAR; INTRAVENOUS EVERY 6 HOURS
Status: COMPLETED | OUTPATIENT
Start: 2019-11-13 | End: 2019-11-13

## 2019-11-13 RX ORDER — LIDOCAINE 40 MG/G
CREAM TOPICAL
Status: COMPLETED | OUTPATIENT
Start: 2019-11-13 | End: 2019-11-13

## 2019-11-13 RX ADMIN — POTASSIUM & SODIUM PHOSPHATES POWDER PACK 280-160-250 MG 1 PACKET: 280-160-250 PACK at 08:20

## 2019-11-13 RX ADMIN — ACETAMINOPHEN 650 MG: 325 TABLET, FILM COATED ORAL at 09:06

## 2019-11-13 RX ADMIN — I.V. FAT EMULSION 120 ML: 20 EMULSION INTRAVENOUS at 09:10

## 2019-11-13 RX ADMIN — PANTOPRAZOLE SODIUM 40 MG: 40 INJECTION, POWDER, FOR SOLUTION INTRAVENOUS at 08:19

## 2019-11-13 RX ADMIN — ACETAMINOPHEN 650 MG: 325 TABLET, FILM COATED ORAL at 19:58

## 2019-11-13 RX ADMIN — POTASSIUM CHLORIDE 20 MEQ: 7.46 INJECTION, SOLUTION INTRAVENOUS at 09:32

## 2019-11-13 RX ADMIN — CEFEPIME HYDROCHLORIDE 2 G: 2 INJECTION, POWDER, FOR SOLUTION INTRAVENOUS at 01:51

## 2019-11-13 RX ADMIN — CALCIUM CHLORIDE 780 MG: 100 INJECTION INTRAVENOUS; INTRAVENTRICULAR at 12:12

## 2019-11-13 RX ADMIN — METRONIDAZOLE 500 MG: 500 INJECTION, SOLUTION INTRAVENOUS at 02:27

## 2019-11-13 RX ADMIN — CEFEPIME HYDROCHLORIDE 2 G: 2 INJECTION, POWDER, FOR SOLUTION INTRAVENOUS at 09:25

## 2019-11-13 RX ADMIN — MELATONIN 2000 UNITS: at 08:15

## 2019-11-13 RX ADMIN — ENOXAPARIN SODIUM 100 MG: 100 INJECTION SUBCUTANEOUS at 21:01

## 2019-11-13 RX ADMIN — METRONIDAZOLE 500 MG: 500 INJECTION, SOLUTION INTRAVENOUS at 21:32

## 2019-11-13 RX ADMIN — HEPARIN 5 ML: 100 SYRINGE at 18:16

## 2019-11-13 RX ADMIN — ACETAMINOPHEN 650 MG: 325 TABLET, FILM COATED ORAL at 15:00

## 2019-11-13 RX ADMIN — CEFEPIME HYDROCHLORIDE 2 G: 2 INJECTION, POWDER, FOR SOLUTION INTRAVENOUS at 19:57

## 2019-11-13 RX ADMIN — FUROSEMIDE 10 MG: 10 INJECTION, SOLUTION INTRAMUSCULAR; INTRAVENOUS at 19:58

## 2019-11-13 RX ADMIN — Medication: at 05:41

## 2019-11-13 RX ADMIN — PHYTONADIONE: 1 INJECTION, EMULSION INTRAMUSCULAR; INTRAVENOUS; SUBCUTANEOUS at 05:24

## 2019-11-13 RX ADMIN — FUROSEMIDE 10 MG: 10 INJECTION, SOLUTION INTRAMUSCULAR; INTRAVENOUS at 08:11

## 2019-11-13 RX ADMIN — METRONIDAZOLE 500 MG: 500 INJECTION, SOLUTION INTRAVENOUS at 11:03

## 2019-11-13 RX ADMIN — POLYETHYLENE GLYCOL 3350 17 G: 17 POWDER, FOR SOLUTION ORAL at 09:07

## 2019-11-13 RX ADMIN — POTASSIUM & SODIUM PHOSPHATES POWDER PACK 280-160-250 MG 1 PACKET: 280-160-250 PACK at 19:58

## 2019-11-13 RX ADMIN — LIDOCAINE: 40 CREAM TOPICAL at 18:27

## 2019-11-13 RX ADMIN — FUROSEMIDE 10 MG: 10 INJECTION, SOLUTION INTRAMUSCULAR; INTRAVENOUS at 14:59

## 2019-11-13 RX ADMIN — POTASSIUM PHOSPHATE, MONOBASIC AND POTASSIUM PHOSPHATE, DIBASIC 20 MMOL: 224; 236 INJECTION, SOLUTION INTRAVENOUS at 22:56

## 2019-11-13 RX ADMIN — ACETAMINOPHEN 650 MG: 325 TABLET, FILM COATED ORAL at 02:03

## 2019-11-13 RX ADMIN — ENOXAPARIN SODIUM 100 MG: 100 INJECTION SUBCUTANEOUS at 09:50

## 2019-11-13 RX ADMIN — PROCHLORPERAZINE EDISYLATE 10 MG: 5 INJECTION INTRAMUSCULAR; INTRAVENOUS at 04:38

## 2019-11-13 RX ADMIN — MELATONIN TAB 3 MG 3 MG: 3 TAB at 21:00

## 2019-11-13 ASSESSMENT — MIFFLIN-ST. JEOR: SCORE: 1840.51

## 2019-11-13 NOTE — PLAN OF CARE
VSS, pt afebrile. PRN ativan given X1 for PIV placement, pt reported pain controlled with morphine PCA pump, pt lethargic this shift but cooperative with cares. No changes made to CPAP of 6, lung sounds diminished, pt reported SOB with exertion. Critical phosphorus lab value, provider notified, order for replacement received, plan to give replacement potassium phosphate when medication is available. Pt tolerating increase in feeds, no stool this shift passing flatus, voiding appropriately with intermittent diuretics. Pt traveled to IR this afternoon for port reacessing, port no infusing properly with blood return noted. Mother updated via telephone of POC today.

## 2019-11-13 NOTE — PROGRESS NOTES
11/13/19 1600   Quick Adds   Type of Visit Initial PT Evaluation   Living Environment   Lives With parent(s)   Transportation Anticipated family or friend will provide   Self-Care   Usual Activity Tolerance good   Current Activity Tolerance moderate   Functional Level Prior   Ambulation 0-->independent   Transferring 0-->independent   Toileting 0-->independent   Bathing 0-->independent   Communication 0-->understands/communicates without difficulty   Swallowing 0-->swallows foods/liquids without difficulty   Cognition 0 - no cognition issues reported   Fall history within last six months no   Which of the above functional risks had a recent onset or change?   (Decreased tolerance to activity)   General Information   Onset of Illness/Injury or Date of Surgery - Date 11/07/19   Patient/Family Goals Statement Get better and leave hospital   Pertinent History of Current Problem (include personal factors and/or comorbidities that impact the POC) Lazaro Lund is a 19 year old male admitted on 11/07/19 for intractable nausea, abdominal pain, severe dehydration, and elevated lipase and amylase consistent with PEG asparaginase-associated pancreatitis. He was transferred to the PICU for concern for distributive shock and need for fluid resuscitation, pressors, and close monitoring. He is now off of pressors and tolerating weans of respiratory support.    Precautions/Limitations   (continuous cardiac and O2 monitoring)   General Info Comments NG tube, central line   Cognitive Status Examination   Orientation orientation to person, place and time   Level of Consciousness alert   Follows Commands and Answers Questions 100% of the time;able to follow multistep instructions   Personal Safety and Judgment intact   Pain Assessment   Patient Currently in Pain Yes, see Vital Sign flowsheet   Posture    Posture Forward head position;Protracted shoulders   Range of Motion (ROM)   ROM Quick Adds No deficits were identified    Strength   Strength Comments Generally deconditioned, but strength WFLs for daily activities   Bed Mobility   Bed Mobility Comments IND   Transfer Skills   Transfer Comments IND   Gait   Gait Comments IND, but slow pace. Pt fatigued after only walking 500ft and requesting seated rest break.   General Therapy Interventions   Planned Therapy Interventions home program guidelines;progressive activity/exercise;strengthening  (aerobic endurance)   Clinical Impression   Criteria for Skilled Therapeutic Intervention yes, treatment indicated   PT Diagnosis Decreased tolerance to functional activity   Influenced by the following impairments Impaired tolerance to activity.   Functional limitations due to impairments Impaired ability to participate in community mobility/activity   Clinical Presentation Evolving/Changing   Clinical Presentation Rationale PMH and current PICU admission   Clinical Decision Making (Complexity) Moderate complexity   Therapy Frequency 4x/week   Predicted Duration of Therapy Intervention (days/wks) 1 week   Anticipated Discharge Disposition Home with Assist   Risk & Benefits of therapy have been explained Yes   Patient, Family & other staff in agreement with plan of care Yes   Clinical Impression Comments Lazaro will benefit from IP PT to progress activity tolerance and update HEP to prepare for safe discharge to home and improve activity tolerance for safe access to community environment.   Total Evaluation Time   Total Evaluation Time (Minutes) 8

## 2019-11-13 NOTE — PROGRESS NOTES
[facility]    Progress Note - PICU       Date of Admission:  11/7/2019  Date of Service: 11/10/19    Assessment & Plan   Lazaro Lund is a 19 year old male admitted on 11/07/19 for intractable nausea, abdominal pain, severe dehydration, and elevated lipase and amylase consistent with PEG asparaginase-associated pancreatitis. He was transferred to the PICU for concern for distributive shock and need for fluid resuscitation, pressors, and close monitoring. He is now off of pressors and tolerating weans of respiratory support.     Today's changes:  - PO ad chase (on top of full enteral feeds)  - Continue current bag of TPN, will run out tonight and then discontinue  - Trial off CPAP (RA, LFNC PRN)  - compazine from scheduled to PRN (not needing PRN nausea meds in past 24 hr)  - Lasix x3 today, goal net neg -- will reassess later today for additional doses  - Increase lovenox to 100 mg BID  - Replace K and phos  - Coags tomorrow  - RUE US to assess for clot in setting of new pain/swelling at right arm  - Discontinue cefepime and metronidazole at 8 pm if remains afebrile and cultures neg    FEN  Dehydration  -Peptamen 1.5 @ 65mL/hr  -TPN -- will run current bag through 8pm, then discontinue  -Electrolyte replacements PRN for K+, Phos, calcium  -Replace calcium for iCal <4  -Replace K+ for <3.5  -Replace phos for phos <2  -Stict I/Os  -PTA vitamin D    RESP  - trial off CPAP today  - RA, LFNC PRN    CV  -Will continue to monitor BPs; goal >100/50  -Echo 11/10 showed normal function     GI  Asparaginase associated pancreatitis - Lipase >3x ULN, significant upper abdominal pain and intractable vomiting. Now concern for necrotizing pancreatitis.  - IV protonix 40mg daily   - GI consulting appreciate recs  - Morphine PCA for pain management  - CT abdomen with IV contrast 11/10; small area necrosis    H/O constipation  -Miralax daily   -Senokot daily PRN    HEME/ONC  T cell lymphoblastic  lymphoma   - Cycle delayed until at least 11/14 per heme notes   - Heme onc consulted   - CBC daily     DVT of the bilateral upper extremities   - Lovenox 100 mg Q12 per heme   - hep 10a 11/14 13:00     PCP ppx   - Bactrim QM/T     Nausea  -Compazine q6h PRN  -Zofran 8 mg PRN  -Ativan 2 mg PRN  -Benadryl PRN  -Scopolamine patch in place    At risk for chemotherapy induced cytopenias  - Maintain hgb > 7  - Maintain plt count > 30 d/t lovenox therapy    ID:  Fever  Concern for infectious pancreatitis  -Cefepime 2 g q8h (11/10-11/13)  -Metronidazole 500 mg q8h (11/10-11/13)  -Discontinue cefepime and metronidazole 11/13 8 pm (if remains afebrile x48 hr and cxs neg)     NEURO:  Abdominal pain 2/2 pancreatitis  -Morphine PCA  -Tylenol PRN   -PTA Melatonin       DVT Prophylaxis: Enoxaparin (Lovenox)  Code Status: Full    Disposition Plan   Pending adequate fluid resuscitation, pain control, appropriate urine output    The patient's care was discussed with the acting attending Dr. Lee and attending Dr. Hume.    Mercedes Monsalve MD  Pediatric Resident, PGY2  [facility]  Pager: [phone]    Pediatric Critical Care Progress Note:    Lazaro Lund remains in the ICU recovering from hypoxic respiratory failure in the setting of SIRS response from necrotic pancreatitis.     I personally examined and evaluated the patient today. All physician orders and treatments were placed at my direction.  Formulated plan with the house staff team or resident(s) and agree with the findings and plan in this note.  I have evaluated all laboratory values and imaging studies from the past 24 hours.  Consults ongoing and ordered are Gastroenterology and Oncology  I personally managed the respiratory and hemodynamic support, metabolic abnormalities, nutritional status, antimicrobial therapy, and pain/sedation management.   Key decisions made today included continue full enteral feeds and allow PO on top of NG feeds. Lasix  therapy for goal net negative fluid balance. Discontinue CPAP therapy as tolerated; follow up evening CBG. Wean morphine PCA. Consider administration of thrombate given low ATIII level if clot burden has increased.   Procedures that will happen in the ICU today are: none  The above plans and care have been discussed with Lazaro and all questions and concerns were addressed.    Pennie Lee MD    Pediatric Critical Care Progress Note:    Lazaro Lund remains in the critical care unit recovering from acute hypoxic respiratory failure, hypotension, and fluid overload due to acute pancreatitis from PEG-asparaginase therapy.    I personally examined and evaluated the patient today. All physician orders and treatments were placed at my direction.   I personally managed the antibiotic therapy, pain management, metabolic abnormalities, and nutritional status. I discussed the patient with the resident and I agree with the plan as outlined above.  Key decisions made today included stopping CPAP and providing NC if needed for hypoxia, continuing TID Lasix today for continued diuresis, weaning morphine PCA, and allow PO intake in addition to NG feeds. He will be able to transition to floor on Heme/Onc service if a bed is available.  I spent a total of 35 minutes providing medical care services at the bedside, on the critical care unit, reviewing laboratory values and radiologic reports for Lazaro Lund.      This patient is no longer critically ill, but requires cardiac/respiratory monitoring, vital sign monitoring, temperature maintenance, enteral feeding adjustments, lab and/or oxygen monitoring by the health care team under direct physician supervision.   The above plans and care have been discussed with mother and patient.  Janet Rae Hume, MD              Interval History    NAEO. Afebrile since 8 pm 11/11. CPAP weaned from 6 to 5 overnight and tolerated well. 10a level obtained overnight and remains  subtherapeutic. Voiding appropriately. No BMs yesterday. New right arm pain/swelling today near PIV. No other new concerns today.     Data reviewed today: I reviewed all medications, new labs and imaging results over the last 24 hours.     Physical Exam   Vital Signs: Temp: 98.8  F (37.1  C) Temp src: Oral BP: 110/61 Pulse: 92 Heart Rate: 91 Resp: 22 SpO2: 99 % O2 Device: BiPAP/CPAP    Weight: 179 lbs 14.33 oz    EXAM:  GENERAL: Lying in bed, sleeping but woke easily on exam. NAD.   SKIN: Clear. No significant rash, abnormal pigmentation or lesions  HEAD: Normocephalic  EYES: EOMI, no injection  NOSE: CPAP in place. No active discharge.   LUNGS: Diminished breath sounds at bilateral lung bases. Lungs otherwise CTAB with no wheezing, crackles, or rhonchi. No increased work of breathing.   HEART: Regular rhythm. Normal S1/S2. No murmurs. Brisk cap refill.   ABDOMEN: Soft, flat, non-distended. No pain with light palpation.   NEUROLOGIC: No focal findings.    Data   Recent Labs   Lab 11/13/19  0453 11/12/19  1720 11/12/19  1712 11/12/19  0454 11/11/19  1657  11/11/19  0411 11/10/19  2043  11/09/19  0630   WBC 9.6 9.4  --   --   --   --  7.6  --    < > 9.5   HGB 7.8* 7.6*  --   --   --   --  8.1*  --    < > 12.6*   MCV 85 85  --   --   --   --  82  --    < > 81    222  --   --   --   --  223  --    < > 383   INR  --   --   --  1.48*  --   --  1.57* 1.61*   < >  --      --  139 140 138  --  138  --    < > 138   POTASSIUM 3.4  --  3.4 2.9* 3.3*   < > 3.3*  --    < > 4.2   CHLORIDE 104  --  103 106 100  --  105  --    < > 106   CO2 32  --  32 32 34*  --  29  --    < > 25   BUN 12  --   --  13 11  --  11  --    < > 17   CR 0.51*  --   --  0.50* 0.51*  --  0.52*  --    < > 0.70   ANIONGAP 3  --  4 2* 4  --  4  --    < > 7   MICHAEL 6.4*  --   --  5.9* 6.2*  --  6.3*  --    < > 7.5*   *  --   --  130* 101*  --  82  --    < > 114*   ALBUMIN  --   --   --  1.8*  --   --  2.1*  --    < > 2.8*   PROTTOTAL  --   --    --  3.3*  --   --  3.6*  --    < >  --    BILITOTAL  --   --   --  1.0  --   --  1.3  --    < >  --    ALKPHOS  --   --   --  115  --   --  95  --    < >  --    ALT  --   --   --  42  --   --  58  --    < >  --    AST  --   --   --  27  --   --  51*  --    < >  --    LIPASE  --   --   --   --   --   --  1,239*  --   --  4,112*    < > = values in this interval not displayed.     No results found for this or any previous visit (from the past 24 hour(s)).  Medications     IV fluid REPLACEMENT ONLY       morphine       norepinephrine Stopped (11/11/19 1562)     parenteral nutrition - ADULT compounded formula 37.5 mL/hr at 11/13/19 0542     - MEDICATION INSTRUCTIONS -         acetaminophen  650 mg Oral Q6H     ceFEPIme (MAXIPIME) IV  2 g Intravenous Q8H     enoxaparin ANTICOAGULANT  100 mg Subcutaneous Q12H     furosemide  10 mg Intravenous Q6H     heparin  5 mL Intracatheter Q28 Days     lipids  120 mL Intravenous Q12H     melatonin  3 mg Oral At Bedtime     metroNIDAZOLE  500 mg Intravenous Q8H     pantoprazole (PROTONIX) IV  40 mg Intravenous Daily with breakfast     polyethylene glycol  17 g Oral Daily     potassium & sodium phosphates  1 packet Oral BID     scopolamine  1 patch Transdermal Q72H     scopolamine   Transdermal Q8H     scopolamine   Transdermal Q72H     sodium chloride (PF)  3 mL Intracatheter Q8H     sodium chloride         sulfamethoxazole-trimethoprim  1 tablet Oral Q Mon Tues BID     Vitamin D3  2,000 Units Oral Daily

## 2019-11-13 NOTE — PLAN OF CARE
VSS, pt afebrile. Pt reported pain controlled with morphine PCA pump. CPAP from 5 to 6, lung sounds diminished. Pt tolerating feeds, no stool this shift passing flatus, voiding appropriately. Mother at bedside throughout night.

## 2019-11-13 NOTE — PROGRESS NOTES
[facility], [place]    CONSULT Note - Pediatric Hematology Oncology Service        Date of Admission:  11/10/2019    Assessment & Plan   Lazaro Lund is a 19 year old male admitted on 11/07/19 for intractable nausea, abdominal pain and severe dehydration. 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 He was due to start Cycle 1 day 29 but this was delayed due to his presenting symptoms. Lazaro remains admitted for severe asparaginase associated pancreatitis with elevated lipase, amylase and significant abdominal pain and nausea and now with imaging consistent with pancreatic necrosis and SIRS requiring transfer to the PICU. Currently on cefepime, metronidazole since 11/10 for empiric coverage due to intermittent fevers.     Today:  - Remains in PICU on CPAP  - Recheck Hep10a level remains subtherapeutic, will increase dose as below and obtain an upper extremity ultrasound to assess for any clot propagation from previous given right arm swelling/pain today.  - Close monitoring of I/Os. He was ~net even yesterday     GI  Asparaginase associated pancreatitis - Lipase >3xULN, significant upper abdominal pain and intractable vomiting.  - IV protonix 40mg daily  - TPN and tube feeds started 11/11 - feeds currently at goal  - GI consulting appreciate recs  - Morphine PCA for pain management  - Monitor serial electrolytes  - CT abdomen per GI on 11/10 with 3 cm area of pancreatic necrosis, GI recommending repeat scan in 2 weeks  - Cefepime, metronidazole started on 11/10 due to fever - blood cultures with no growth to date    HEME/ONC  T cell lymphoblastic lymphoma - VHR - treated per protocol [id number], due for D29 of consolidation [id number] Consolidation. Not neutropenic on admission   - Cycle 1 D29 delayed until complete control of current illness     DVT of the bilateral upper extremities  -Subtherapeutic level -  Increased Lovenox from 80mg q12h to 100mg q12h on 11/13  -Next Hep10a level on 11/14 at 1300  - Agree with ATIII replacement if upper extremity clots worsening     PCP ppx  -Bactrim QM/T      Nausea  -Zofran available PRN as QTc was not actually significantly prolonged when EKG reviewed by cardiologist   -Ativan 1-2 mg 6h PRN   -Benadryl PRN  -Scopolamine patch in place     At risk for chemotherapy-induced cytopenias  - maintain hgb > 7  - maintain plt count > 30 d/t lovenox therapy     FEN:  Severe malnutrition as evidenced by < 50% of necessary intake for > 5 days, moderate fluid accumulation, and > 2% weight loss in < 1 week  Dehydration due to poor oral intake  Capillary Leak   -s/p Norepi gtt  -Stict I/O  -NG tube feeds started 11/11 - tolerating, working up to goal  -PTA vitamin D     H/O constipation - at risk of opioid induced constipation  -Miralax daily PRN  -Senokot daily PRN     NEURO:  Abdominal pain 2/2 pancreatitis  -Morphine PCA  -Tylenol Q6  -PTA Melatonin    CV:  EKG obtained 11/8 for elevated HR, noted to have prolonged QTC of 478 that increased to 528 on 11/10 initially, but then on overread was noted to be 437 so not truly prolonged    RESP:   Pleural efffusions 2/2 volume overload and capillary leak  - CPAP and O2 as needed     DVT Prophylaxis: Low Risk/Ambulatory with no additional VTE prophylaxis indicated given Enoxaparin (Lovenox) subcutaneous treatment  Lara Catheter: not present  Code Status: Full    Patient was discussed with attending, Dr. Reynaldo Campuzano.     Donna Barillas MD MPH  Pediatric Heme/Onc & BMT Fellow  Pager: [phone]    Physician Attestation    I saw and evaluated the patient. I discussed with the fellow and agree with the findings and plan as documented in the fellow's note.     Reynaldo Campuzano MD  Pediatric Hematology/Oncology  [facility]  Date of Service (when I saw the patient): 11/13/2019      Interval History     OvenLazaro king continued to have stable blood pressures and good urinary output. His abdominal pain is well managed and continues on the Morphine PCA. He remained stable on CPAP overnight. He is tolerating goal NG feeds and reports his nausea has been well controlled.     Data reviewed today: I reviewed all medications, new labs and imaging results over the last 24 hours.     Physical Exam   Vital Signs: Temp: 97.9  F (36.6  C) Temp src: Oral BP: 99/74 Pulse: 125 Heart Rate: 108 Resp: 25 SpO2: 96 % O2 Device: BiPAP/CPAP    Weight: 179 lbs 14.33 oz    EXAM:  GENERAL: lying in bed, awake and comfortable on exam  SKIN: Clear. No significant rash, abnormal pigmentation or lesions  HEAD: Normocephalic  EYES: anicteric  EARS: Normal external canals.  NOSE: Normal without discharge.  LUNGS: CTAB, non labored breaths, CPAP mask sitting on bed beside patient.  HEART: Regular rhythm. Normal S1/S2. No murmurs. Normal pulses.  ABDOMEN: Soft, flat, non-distended.  EXT: mild edema of right forearm (site of peripheral IVs) with mild tenderness to palpation  NEUROLOGIC: no focal deficits    Data   Recent Labs   Lab 11/13/19  1224 11/13/19  0453 11/12/19  1720 11/12/19  1712 11/12/19  0454 11/11/19  1657  11/11/19  0411 11/10/19  2043  11/09/19  0630   WBC  --  9.6 9.4  --   --   --   --  7.6  --    < > 9.5   HGB  --  7.8* 7.6*  --   --   --   --  8.1*  --    < > 12.6*   MCV  --  85 85  --   --   --   --  82  --    < > 81   PLT  --  234 222  --   --   --   --  223  --    < > 383   INR  --   --   --   --  1.48*  --   --  1.57* 1.61*   < >  --    NA  --  139  --  139 140 138  --  138  --    < > 138   POTASSIUM 4.1 3.4  --  3.4 2.9* 3.3*   < > 3.3*  --    < > 4.2   CHLORIDE  --  104  --  103 106 100  --  105  --    < > 106   CO2  --  32  --  32 32 34*  --  29  --    < > 25   BUN  --  12  --   --  13 11  --  11  --    < > 17   CR  --  0.51*  --   --  0.50* 0.51*  --  0.52*  --    < > 0.70   ANIONGAP  --  3  --  4 2* 4  --  4  --     < > 7   MICHAEL  --  6.4*  --   --  5.9* 6.2*  --  6.3*  --    < > 7.5*   GLC  --  102*  --   --  130* 101*  --  82  --    < > 114*   ALBUMIN  --   --   --   --  1.8*  --   --  2.1*  --    < > 2.8*   PROTTOTAL  --   --   --   --  3.3*  --   --  3.6*  --    < >  --    BILITOTAL  --   --   --   --  1.0  --   --  1.3  --    < >  --    ALKPHOS  --   --   --   --  115  --   --  95  --    < >  --    ALT  --   --   --   --  42  --   --  58  --    < >  --    AST  --   --   --   --  27  --   --  51*  --    < >  --    LIPASE  --   --   --   --   --   --   --  1,239*  --   --  4,112*    < > = values in this interval not displayed.     Recent Results (from the past 24 hour(s))   US Upper Extremity Venous Duplex Right Portable    Narrative    EXAMINATION: US UPPER EXTREMITY VENOUS DUPLEX RIGHT PORTABLE   11/13/2019 11:24 AM      CLINICAL HISTORY: arm pain, swelling. Hx of clots. acute hypoxic  respiratory failure secondary to necrotizing pancreatitis.    COMPARISON: 10/24/2019 upper extremity ultrasound        PROCEDURE COMMENTS: Ultrasound was performed of the deep venous system  of the right and left upper extremity using grayscale, color, and  spectral Doppler.    FINDINGS:    There is occlusive thrombus in the right internal jugular,  medial/lateral subclavian, axillary, basilic vein and left internal  jugular vein. Nonocclusive thrombus is present in the bilateral  innominate veins and left medial subclavian.      Impression    IMPRESSION:  Occlusive and nonocclusive thrombus in the bilateral upper extremities  as detailed above and similar to prior.    I have personally reviewed the examination and initial interpretation  and I agree with the findings.    RYAN ANTONIO MD     Medications     IV fluid REPLACEMENT ONLY       morphine       parenteral nutrition - ADULT compounded formula 37.5 mL/hr at 11/13/19 0524     - MEDICATION INSTRUCTIONS -         acetaminophen  650 mg Oral Q6H     ceFEPIme (MAXIPIME) IV  2 g Intravenous Q8H      enoxaparin ANTICOAGULANT  100 mg Subcutaneous Q12H     furosemide  10 mg Intravenous Q6H     heparin  5 mL Intracatheter Q28 Days     heparin  5 mL Intracatheter Q28 Days     heparin lock flush  3-6 mL Intracatheter Q24H     lipids  120 mL Intravenous Q12H     melatonin  3 mg Oral At Bedtime     metroNIDAZOLE  500 mg Intravenous Q8H     pantoprazole (PROTONIX) IV  40 mg Intravenous Daily with breakfast     polyethylene glycol  17 g Oral Daily     potassium & sodium phosphates  1 packet Oral BID     scopolamine  1 patch Transdermal Q72H     scopolamine   Transdermal Q8H     scopolamine   Transdermal Q72H     sodium chloride (PF)  10 mL Intracatheter Q28 Days     sodium chloride (PF)  3 mL Intracatheter Q8H     sulfamethoxazole-trimethoprim  1 tablet Oral Q Mon Tues BID     Vitamin D3  2,000 Units Oral Daily

## 2019-11-13 NOTE — PROGRESS NOTES
[facility]  PEDIATRIC HEMATOLOGY/ONCOLOGY   SOCIAL WORK PROGRESS NOTE      DATA:     Lazaro is a 19 year old male with VHR T-Cell Lymphoblastic Lymphoma admitted on 11/7/19 for intractable nausea, abdominal pain and severe dehydration. He was transferred to PICU on 11/10 d/t SIRs secondary to pancreatitis (associated with PEG), resulting in capillary leak with pleural effusions, ascites, hypotension, decreased UOP, and persistent tachycardia. He is now off pressors and tolerating weans of respiratory support. HONEY met supportively with Lazaro and his mother, Carmen mid morning to check-in. Lazaro shared he is doing okay, as well as can be expected. He is very tired. He is not liking the NJ tube though he explained he didn't notice when they administered his feeds through it. His brother, Jake and their friends, Kwame and Itz visited Monday night. He had family visit last night. He hasn't felt much like visiting and has slept on/off through the visits. MomCarmen has been staying overnights. She plans on going home to sleep tonight and Lazaro's Dad, Jean-Pierre will spend the night. SW notified MomCarmen that Lazaro was able to receive SMRT certification (approved November 5th). This should allow his MA to backdate to August 1st, 2019. Message left for [facility] Financial Team requesting effective date be adjusted to 8/1. Once return call is received SW will reach out to /[facility] billing to request August bills be resubmitted for MA payment. Johnnie denied any immediate needs at time of our visit.     INTERVENTION:     1. Supportive counseling. Check-in.  2. [facility]   3. Message to [facility] requesting adjusted effective date for MA.     ASSESSMENT:     Lazaro is doing as well as can be expected. He is very tired. Mom has been present and attentive. Dad will be present this evening. Patient and family are open to and  appreciative of ongoing therapeutic support, advocacy, and connection with resources.     PLAN:     Social work will continue to assess needs and provide ongoing psychosocial support and access to resources.      CHEY Davis, LICHONEY, OSW-C  Clinical    Pediatric Hematology Oncology   [facility]   Monday-Thursday   Phone: [phone]  Pager: [phone]    NO LETTER

## 2019-11-14 LAB
ALBUMIN SERPL-MCNC: 2.4 G/DL (ref 3.4–5)
ALP SERPL-CCNC: 444 U/L (ref 40–150)
ALT SERPL W P-5'-P-CCNC: 43 U/L (ref 0–70)
ANION GAP SERPL CALCULATED.3IONS-SCNC: 4 MMOL/L (ref 3–14)
ANION GAP SERPL CALCULATED.3IONS-SCNC: 6 MMOL/L (ref 3–14)
ANISOCYTOSIS BLD QL SMEAR: SLIGHT
APTT PPP: 45 SEC (ref 22–37)
AST SERPL W P-5'-P-CCNC: 27 U/L (ref 0–45)
BASOPHILS # BLD AUTO: 0 10E9/L (ref 0–0.2)
BASOPHILS NFR BLD AUTO: 0 %
BILIRUB SERPL-MCNC: 0.7 MG/DL (ref 0.2–1.3)
BUN SERPL-MCNC: 7 MG/DL (ref 7–30)
CA-I BLD-MCNC: 4.4 MG/DL (ref 4.4–5.2)
CALCIUM SERPL-MCNC: 7.2 MG/DL (ref 8.5–10.1)
CHLORIDE SERPL-SCNC: 103 MMOL/L (ref 94–109)
CHLORIDE SERPL-SCNC: 103 MMOL/L (ref 94–109)
CO2 SERPL-SCNC: 28 MMOL/L (ref 20–32)
CO2 SERPL-SCNC: 28 MMOL/L (ref 20–32)
CREAT SERPL-MCNC: 0.41 MG/DL (ref 0.66–1.25)
DIFFERENTIAL METHOD BLD: ABNORMAL
EOSINOPHIL # BLD AUTO: 0 10E9/L (ref 0–0.7)
EOSINOPHIL NFR BLD AUTO: 0 %
ERYTHROCYTE [DISTWIDTH] IN BLOOD BY AUTOMATED COUNT: 23.9 % (ref 10–15)
FIBRINOGEN PPP-MCNC: 293 MG/DL (ref 200–420)
GFR SERPL CREATININE-BSD FRML MDRD: >90 ML/MIN/{1.73_M2}
GLUCOSE SERPL-MCNC: 144 MG/DL (ref 70–99)
HCT VFR BLD AUTO: 25.2 % (ref 40–53)
HGB BLD-MCNC: 8.1 G/DL (ref 13.3–17.7)
INR PPP: 1.09 (ref 0.86–1.14)
LMWH PPP CHRO-ACNC: 0.47 IU/ML
LYMPHOCYTES # BLD AUTO: 0.2 10E9/L (ref 0.8–5.3)
LYMPHOCYTES NFR BLD AUTO: 1.7 %
MAGNESIUM SERPL-MCNC: 2 MG/DL (ref 1.6–2.3)
MAGNESIUM SERPL-MCNC: 2 MG/DL (ref 1.6–2.3)
MCH RBC QN AUTO: 28.2 PG (ref 26.5–33)
MCHC RBC AUTO-ENTMCNC: 32.1 G/DL (ref 31.5–36.5)
MCV RBC AUTO: 88 FL (ref 78–100)
METAMYELOCYTES # BLD: 0.2 10E9/L
METAMYELOCYTES NFR BLD MANUAL: 1.7 %
MICROCYTES BLD QL SMEAR: PRESENT
MONOCYTES # BLD AUTO: 0.8 10E9/L (ref 0–1.3)
MONOCYTES NFR BLD AUTO: 6.9 %
NEUTROPHILS # BLD AUTO: 10.5 10E9/L (ref 1.6–8.3)
NEUTROPHILS NFR BLD AUTO: 89.7 %
PHOSPHATE SERPL-MCNC: 2.8 MG/DL (ref 2.5–4.5)
PHOSPHATE SERPL-MCNC: 3.5 MG/DL (ref 2.5–4.5)
PLATELET # BLD AUTO: 281 10E9/L (ref 150–450)
PLATELET # BLD EST: NORMAL 10*3/UL
POTASSIUM SERPL-SCNC: 3.9 MMOL/L (ref 3.4–5.3)
POTASSIUM SERPL-SCNC: 4.6 MMOL/L (ref 3.4–5.3)
PROT SERPL-MCNC: 4.9 G/DL (ref 6.8–8.8)
RBC # BLD AUTO: 2.87 10E12/L (ref 4.4–5.9)
SODIUM SERPL-SCNC: 135 MMOL/L (ref 133–144)
SODIUM SERPL-SCNC: 137 MMOL/L (ref 133–144)
WBC # BLD AUTO: 11.7 10E9/L (ref 4–11)

## 2019-11-14 PROCEDURE — 25000132 ZZH RX MED GY IP 250 OP 250 PS 637: Performed by: STUDENT IN AN ORGANIZED HEALTH CARE EDUCATION/TRAINING PROGRAM

## 2019-11-14 PROCEDURE — 25000128 H RX IP 250 OP 636: Performed by: STUDENT IN AN ORGANIZED HEALTH CARE EDUCATION/TRAINING PROGRAM

## 2019-11-14 PROCEDURE — 85384 FIBRINOGEN ACTIVITY: CPT | Performed by: STUDENT IN AN ORGANIZED HEALTH CARE EDUCATION/TRAINING PROGRAM

## 2019-11-14 PROCEDURE — 84100 ASSAY OF PHOSPHORUS: CPT | Performed by: STUDENT IN AN ORGANIZED HEALTH CARE EDUCATION/TRAINING PROGRAM

## 2019-11-14 PROCEDURE — 80051 ELECTROLYTE PANEL: CPT | Performed by: STUDENT IN AN ORGANIZED HEALTH CARE EDUCATION/TRAINING PROGRAM

## 2019-11-14 PROCEDURE — 25800030 ZZH RX IP 258 OP 636

## 2019-11-14 PROCEDURE — 83735 ASSAY OF MAGNESIUM: CPT | Performed by: STUDENT IN AN ORGANIZED HEALTH CARE EDUCATION/TRAINING PROGRAM

## 2019-11-14 PROCEDURE — 85025 COMPLETE CBC W/AUTO DIFF WBC: CPT | Performed by: STUDENT IN AN ORGANIZED HEALTH CARE EDUCATION/TRAINING PROGRAM

## 2019-11-14 PROCEDURE — 80053 COMPREHEN METABOLIC PANEL: CPT | Performed by: STUDENT IN AN ORGANIZED HEALTH CARE EDUCATION/TRAINING PROGRAM

## 2019-11-14 PROCEDURE — 85610 PROTHROMBIN TIME: CPT | Performed by: STUDENT IN AN ORGANIZED HEALTH CARE EDUCATION/TRAINING PROGRAM

## 2019-11-14 PROCEDURE — 82330 ASSAY OF CALCIUM: CPT | Performed by: STUDENT IN AN ORGANIZED HEALTH CARE EDUCATION/TRAINING PROGRAM

## 2019-11-14 PROCEDURE — C9113 INJ PANTOPRAZOLE SODIUM, VIA: HCPCS | Performed by: STUDENT IN AN ORGANIZED HEALTH CARE EDUCATION/TRAINING PROGRAM

## 2019-11-14 PROCEDURE — 25000128 H RX IP 250 OP 636: Performed by: PEDIATRICS

## 2019-11-14 PROCEDURE — 85520 HEPARIN ASSAY: CPT | Performed by: STUDENT IN AN ORGANIZED HEALTH CARE EDUCATION/TRAINING PROGRAM

## 2019-11-14 PROCEDURE — 25800030 ZZH RX IP 258 OP 636: Performed by: STUDENT IN AN ORGANIZED HEALTH CARE EDUCATION/TRAINING PROGRAM

## 2019-11-14 PROCEDURE — 12000014 ZZH R&B PEDS UMMC

## 2019-11-14 PROCEDURE — 25000125 ZZHC RX 250: Performed by: STUDENT IN AN ORGANIZED HEALTH CARE EDUCATION/TRAINING PROGRAM

## 2019-11-14 PROCEDURE — 85730 THROMBOPLASTIN TIME PARTIAL: CPT | Performed by: STUDENT IN AN ORGANIZED HEALTH CARE EDUCATION/TRAINING PROGRAM

## 2019-11-14 RX ORDER — SODIUM CHLORIDE 9 MG/ML
INJECTION, SOLUTION INTRAVENOUS CONTINUOUS
Status: DISCONTINUED | OUTPATIENT
Start: 2019-11-14 | End: 2019-11-17 | Stop reason: HOSPADM

## 2019-11-14 RX ORDER — SODIUM CHLORIDE 9 MG/ML
INJECTION, SOLUTION INTRAVENOUS
Status: COMPLETED
Start: 2019-11-14 | End: 2019-11-14

## 2019-11-14 RX ORDER — FUROSEMIDE 10 MG/ML
10 INJECTION INTRAMUSCULAR; INTRAVENOUS 2 TIMES DAILY
Status: DISCONTINUED | OUTPATIENT
Start: 2019-11-15 | End: 2019-11-15

## 2019-11-14 RX ORDER — FUROSEMIDE 10 MG/ML
10 INJECTION INTRAMUSCULAR; INTRAVENOUS EVERY 6 HOURS
Status: DISCONTINUED | OUTPATIENT
Start: 2019-11-14 | End: 2019-11-14

## 2019-11-14 RX ORDER — FUROSEMIDE 10 MG/ML
10 INJECTION INTRAMUSCULAR; INTRAVENOUS ONCE
Status: COMPLETED | OUTPATIENT
Start: 2019-11-14 | End: 2019-11-14

## 2019-11-14 RX ADMIN — SODIUM CHLORIDE 2000 ML: 9 INJECTION, SOLUTION INTRAVENOUS at 11:23

## 2019-11-14 RX ADMIN — HEPARIN, PORCINE (PF) 10 UNIT/ML INTRAVENOUS SYRINGE 5 ML: at 21:31

## 2019-11-14 RX ADMIN — LORAZEPAM 2 MG: 2 INJECTION INTRAMUSCULAR; INTRAVENOUS at 15:29

## 2019-11-14 RX ADMIN — FUROSEMIDE 10 MG: 10 INJECTION, SOLUTION INTRAMUSCULAR; INTRAVENOUS at 08:35

## 2019-11-14 RX ADMIN — POTASSIUM & SODIUM PHOSPHATES POWDER PACK 280-160-250 MG 1 PACKET: 280-160-250 PACK at 08:35

## 2019-11-14 RX ADMIN — SODIUM CHLORIDE 1000 ML: 9 INJECTION, SOLUTION INTRAVENOUS at 12:30

## 2019-11-14 RX ADMIN — FUROSEMIDE 10 MG: 10 INJECTION, SOLUTION INTRAMUSCULAR; INTRAVENOUS at 20:11

## 2019-11-14 RX ADMIN — HEPARIN, PORCINE (PF) 10 UNIT/ML INTRAVENOUS SYRINGE 5 ML: at 19:38

## 2019-11-14 RX ADMIN — ACETAMINOPHEN 650 MG: 325 TABLET, FILM COATED ORAL at 08:34

## 2019-11-14 RX ADMIN — PANTOPRAZOLE SODIUM 40 MG: 40 INJECTION, POWDER, FOR SOLUTION INTRAVENOUS at 08:45

## 2019-11-14 RX ADMIN — MELATONIN 2000 UNITS: at 08:34

## 2019-11-14 RX ADMIN — ENOXAPARIN SODIUM 100 MG: 100 INJECTION SUBCUTANEOUS at 21:27

## 2019-11-14 RX ADMIN — ACETAMINOPHEN 650 MG: 325 TABLET, FILM COATED ORAL at 19:34

## 2019-11-14 RX ADMIN — LORAZEPAM 2 MG: 2 INJECTION INTRAMUSCULAR; INTRAVENOUS at 21:55

## 2019-11-14 RX ADMIN — HEPARIN, PORCINE (PF) 10 UNIT/ML INTRAVENOUS SYRINGE 5 ML: at 16:39

## 2019-11-14 RX ADMIN — ACETAMINOPHEN 650 MG: 325 TABLET, FILM COATED ORAL at 13:40

## 2019-11-14 RX ADMIN — MELATONIN TAB 3 MG 3 MG: 3 TAB at 21:54

## 2019-11-14 RX ADMIN — DIPHENHYDRAMINE HYDROCHLORIDE 25 MG: 25 CAPSULE ORAL at 04:10

## 2019-11-14 RX ADMIN — ENOXAPARIN SODIUM 100 MG: 100 INJECTION SUBCUTANEOUS at 08:42

## 2019-11-14 RX ADMIN — ONDANSETRON 8 MG: 2 INJECTION INTRAMUSCULAR; INTRAVENOUS at 05:40

## 2019-11-14 RX ADMIN — ACETAMINOPHEN 650 MG: 325 TABLET, FILM COATED ORAL at 02:09

## 2019-11-14 RX ADMIN — POTASSIUM & SODIUM PHOSPHATES POWDER PACK 280-160-250 MG 1 PACKET: 280-160-250 PACK at 20:19

## 2019-11-14 RX ADMIN — DIPHENHYDRAMINE HYDROCHLORIDE 50 MG: 25 CAPSULE ORAL at 21:55

## 2019-11-14 RX ADMIN — SCOPALAMINE 1 PATCH: 1 PATCH, EXTENDED RELEASE TRANSDERMAL at 08:41

## 2019-11-14 RX ADMIN — SODIUM CHLORIDE, PRESERVATIVE FREE 3 ML: 5 INJECTION INTRAVENOUS at 17:25

## 2019-11-14 ASSESSMENT — MIFFLIN-ST. JEOR: SCORE: 1797.51

## 2019-11-14 NOTE — PLAN OF CARE
PT Unit 5: Cancel PT, checked on pt, pt sleeping, unable to check back. Will reschedule for tomorrow 11/15.    Tessy Oliver, PT, [phone]

## 2019-11-14 NOTE — PLAN OF CARE
Tmax 99.2. All other VSS. Lung sounds diminished at the bases. X2 loose stools. Good urine OP, x1 scheduled lasix given. PRN Benadryl and Zofran given for nausea. Pt using PCA and stated that it is taking care of his pain. Pt. Restless overnight and stated that he did not sleep well. Pt anxious periodically and stated that he really doesn't like having all of the monitor on. Father at bedside most of night.

## 2019-11-14 NOTE — PLAN OF CARE
PT Unit 3: PT evaluation complete and treatment initiated. Pt able to walk one lap of unit, VSS. Pt with impaired activity tolerance. PT will initiate at a frequency of 4x/week.  Lauren Parra, PT, [phone]

## 2019-11-14 NOTE — PROGRESS NOTES
[facility]    Progress Note - PICU       Date of Admission:  11/7/2019  Date of Service: 11/10/19    Assessment & Plan   Lazaro Lund is a 19 year old male admitted on 11/07/19 for PEG asparaginase-associated pancreatitis. Small area of pancreatic necrosis identified on CT. He was transferred to the PICU for concern for distributive shock and need for fluid resuscitation, pressors, and close monitoring. He is now off of pressors, on room air, and appropriate for transfer to the floor.      Today's changes:  - Hold enteral feeds to encourage PO  - Plan to discuss nutrition recs with dietician today  - Nutraphos to PO (prev giving in enteral feeds)  - S/p lasix x1 this morning, plan to reassess fluid status in afternoon and determine if additional doses warranted.   - no further ical checks. Continue lytes, mg, and phos q12hr while diuresing   - Incentive spirometry q2hr while awake  - space CBC to every other day  - hep10a this afternoon  - transfer to floor    FEN  -Previously on Peptamen 1.5 @ 65mL/hr -- will hold today to encourage PO  -PO ad chase -- plan to discuss nutrition recs with dietician today  -Lytes (BMP qAM, electrolyte panel qPM), Mg, Phos q12 hr  -Electrolyte replacements PRN   -Replace K+ for <3.5  -Replace phos for phos <2  -Stict I/Os  -PTA vitamin D  -lasix PRN for diuresis -- readdressing daily   - lasix x1 this morning, reassess fluid status later today to determine if further doses warranted    RESP  - RA  - incentive spirometry q2 hr while awake    CV  -Will continue to monitor BPs; goal >100/50  -Echo 11/10 showed normal function     GI  Asparaginase associated pancreatitis - small area of necrosis on CT.  - IV protonix 40mg daily   - GI consulting, appreciate recs  - Morphine PCA for pain management, wean as tolerated  - CT abdomen with IV contrast 11/10; small area necrosis  - Will need follow up CT or MR in 2 weeks (~11/24)  - Will need to follow up with  Dr. Herb Coronel with adult gastroenterology in 4 weeks as outpatient     H/O constipation  -Miralax daily   -Senokot daily PRN    HEME/ONC  T cell lymphoblastic lymphoma   - Cycle delayed until at least 11/14 per heme notes   - Heme onc consulted    Anemia--hgb previously downtrending, now stabilized x3 days   - CBC QOD     DVT of the bilateral upper extremities   - Lovenox 100 mg Q12 per heme   - hep 10a 11/14 13:00     PCP ppx   - Bactrim QM/T     Nausea  -Compazine q6h PRN  -Zofran 8 mg PRN  -Ativan 2 mg PRN  -Benadryl PRN  -Scopolamine patch in place    At risk for chemotherapy induced cytopenias  - Maintain hgb > 7  - Maintain plt count > 30 d/t lovenox therapy    ID:  Fever, concern for infectious pancreatitis - resolved  - s/p Cefepime 2 g q8h (11/10-11/13)  - s/p Metronidazole 500 mg q8h (11/10-11/13)     NEURO:  Abdominal pain 2/2 pancreatitis  -Morphine PCA  -Tylenol PRN   -PTA Melatonin       DVT Prophylaxis: Enoxaparin (Lovenox)  Code Status: Full    Disposition Plan   Pending adequate fluid resuscitation, pain control, appropriate urine output    The patient's care was discussed with the acting attending Dr. Lee and attending Dr. Hume.    Mercedes Monsalve MD  Pediatric Resident, PGY2  [facility]  Pager: [phone]    Pediatric Critical Care Progress Note:    Lazaro Lund remains in the critical care unit recovering from hypoxic respiratory failure    I personally examined and evaluated the patient today. All physician orders and treatments were placed at my direction.   I personally managed the antibiotic therapy, pain management, metabolic abnormalities, and nutritional status.   Key decisions made today included continue on room air but encourage incentive spirometry. Hold NG feeds to encourage PO intake; consult with dietary regarding caloric needs. Continue enoxaparin. Continue with morphine PCA at same dose given increased PRN usage yesterday. Transfer to hematology  team  This patient is no longer critically ill, but requires cardiac/respiratory monitoring, vital sign monitoring, temperature maintenance, enteral feeding adjustments, lab and/or oxygen monitoring by the health care team under direct physician supervision.   The above plans and care have been discussed with Lazaro Lee MD     Pediatric Critical Care Progress Note:    Lazaro Lund remains in the critical care unit recovering from acute hypoxic respiratory failure due to acute PEG-asparaginase related pancreatitis and fluid overload.    I personally examined and evaluated the patient today. All physician orders and treatments were placed at my direction.   I personally managed the antibiotic therapy, pain management, metabolic abnormalities, and nutritional status. I discussed the patient with the resident and I agree with the plan as outlined above.  Key decisions made today included repeating Lasix x1 today and monitoring fluid balance, removing NG tube and monitoring PO intake closely, and transferring to floor on heme/onc service when bed is available.  I spent a total of 35 minutes providing medical care services at the bedside, on the critical care unit, reviewing laboratory values and radiologic reports for Lazaro Lund.      This patient is no longer critically ill, but requires cardiac/respiratory monitoring, vital sign monitoring, temperature maintenance, enteral feeding adjustments, lab and/or oxygen monitoring by the health care team under direct physician supervision.   The above plans and care have been discussed with mother and patient.  Janet Rae Hume, MD      Interval History    NAEO. Lazaro reports his pain is well controlled overall, although he does still have some epigastric pain. He used increased morphine PCA bumps compared to previous after morphine wean yesterday. He states his nausea is well controlled. His right arm pain/swelling noted yesterday has improved  significantly. He weaned from LFNC to room air yesterday afternoon and remained on RA overnight without issue. No dyspnea this morning. He is eager to have his enteral feeding tube removed. No other concerns this morning. Voiding and stooling appropriately (has had BM in past 24 hrs, although not charted).     Data reviewed today: I reviewed all medications, new labs and imaging results over the last 24 hours.     Physical Exam   Vital Signs: Temp: 99.2  F (37.3  C) Temp src: Axillary BP: 123/74 Pulse: 93 Heart Rate: 101 Resp: 19 SpO2: 98 % O2 Device: None (Room air)    Weight: 177 lbs .47 oz    EXAM:  GENERAL: Lying in bed, sleeping but woke easily on exam. NAD.   SKIN: Clear. No significant rash, abnormal pigmentation or lesions  HEAD: Normocephalic  EYES: EOMI, no injection  NOSE: Enteral tube in place. No active discharge.   LUNGS: Diminished breath sounds at bilateral lung bases. Lungs otherwise CTAB with no wheezing, crackles, or rhonchi. No increased work of breathing.   HEART: Regular rhythm. Normal S1/S2. No murmurs. Brisk cap refill.   ABDOMEN: Soft, flat, non-distended. Pain with moderate palpation of epigastric area. No pain with palpation elsewhere.   NEUROLOGIC: No focal findings. Alert and interactive. Appropriate responses to questioning. CN grossly intact. Moving all extremities equally.     Data   Recent Labs   Lab 11/14/19  0521 11/13/19  1922 11/13/19  1224 11/13/19  0453 11/12/19  1720  11/12/19  0454  11/11/19  0411  11/09/19  0630   WBC 11.7*  --   --  9.6 9.4  --   --   --  7.6   < > 9.5   HGB 8.1*  --   --  7.8* 7.6*  --   --   --  8.1*   < > 12.6*   MCV 88  --   --  85 85  --   --   --  82   < > 81     --   --  234 222  --   --   --  223   < > 383   INR 1.09  --   --   --   --   --  1.48*  --  1.57*   < >  --     137  --  139  --    < > 140   < > 138   < > 138   POTASSIUM 4.6 3.9 4.1 3.4  --    < > 2.9*   < > 3.3*   < > 4.2   CHLORIDE 103 103  --  104  --    < > 106   < > 105    < > 106   CO2 28 31  --  32  --    < > 32   < > 29   < > 25   BUN 7  --   --  12  --   --  13   < > 11   < > 17   CR 0.41*  --   --  0.51*  --   --  0.50*   < > 0.52*   < > 0.70   ANIONGAP 4 3  --  3  --    < > 2*   < > 4   < > 7   MICHAEL 7.2*  --   --  6.4*  --   --  5.9*   < > 6.3*   < > 7.5*   *  --   --  102*  --   --  130*   < > 82   < > 114*   ALBUMIN  --   --   --   --   --   --  1.8*  --  2.1*   < > 2.8*   PROTTOTAL  --   --   --   --   --   --  3.3*  --  3.6*   < >  --    BILITOTAL  --   --   --   --   --   --  1.0  --  1.3   < >  --    ALKPHOS  --   --   --   --   --   --  115  --  95   < >  --    ALT  --   --   --   --   --   --  42  --  58   < >  --    AST  --   --   --   --   --   --  27  --  51*   < >  --    LIPASE  --   --   --   --   --   --   --   --  1,239*  --  4,112*    < > = values in this interval not displayed.     Recent Results (from the past 24 hour(s))   US Upper Extremity Venous Duplex Right Portable    Narrative    EXAMINATION: US UPPER EXTREMITY VENOUS DUPLEX RIGHT PORTABLE   11/13/2019 11:24 AM      CLINICAL HISTORY: arm pain, swelling. Hx of clots. acute hypoxic  respiratory failure secondary to necrotizing pancreatitis.    COMPARISON: 10/24/2019 upper extremity ultrasound        PROCEDURE COMMENTS: Ultrasound was performed of the deep venous system  of the right and left upper extremity using grayscale, color, and  spectral Doppler.    FINDINGS:    There is occlusive thrombus in the right internal jugular,  medial/lateral subclavian, axillary, basilic vein and left internal  jugular vein. Nonocclusive thrombus is present in the bilateral  innominate veins and left medial subclavian.      Impression    IMPRESSION:  Occlusive and nonocclusive thrombus in the bilateral upper extremities  as detailed above and similar to prior.    I have personally reviewed the examination and initial interpretation  and I agree with the findings.    RYAN ANTONIO MD     Medications     IV fluid  REPLACEMENT ONLY       morphine       - MEDICATION INSTRUCTIONS -         acetaminophen  650 mg Oral Q6H     enoxaparin ANTICOAGULANT  100 mg Subcutaneous Q12H     furosemide  10 mg Intravenous Q6H     heparin  5 mL Intracatheter Q28 Days     heparin  5 mL Intracatheter Q28 Days     heparin lock flush  3-6 mL Intracatheter Q24H     melatonin  3 mg Oral At Bedtime     pantoprazole (PROTONIX) IV  40 mg Intravenous Daily with breakfast     polyethylene glycol  17 g Oral Daily     potassium & sodium phosphates  1 packet Oral BID     scopolamine  1 patch Transdermal Q72H     scopolamine   Transdermal Q8H     scopolamine   Transdermal Q72H     sodium chloride (PF)  10 mL Intracatheter Q28 Days     sodium chloride (PF)  3 mL Intracatheter Q8H     sulfamethoxazole-trimethoprim  1 tablet Oral Q Mon Tues BID     Vitamin D3  2,000 Units Oral Daily

## 2019-11-14 NOTE — PROGRESS NOTES
Afebrile. PCA weaned - tolerating. No PRNs given. Weaned off CPAP to PRN NC @ 1 L. 25%. VSS. Voiding and stooling well. New dressing on port-a-cath. See prev note. Reaccessed @ 4999. Plan to go up to the floor tomorrow. Mom and dad at bedside throughout shift - updated/involed in POC.

## 2019-11-14 NOTE — SIGNIFICANT EVENT
At 1630 pt disconnected his tubing from his port-a-cath, exposing the port to open air. When notified RN cleaned the area and put a cap on the line. Consulted vascular. The line was then heparinized and deaccessed per protocol. The line was reaccessed and blood return was noted. Patient was educated on why this is an infection risk and claims it will not happen again. MD notified.

## 2019-11-14 NOTE — PROGRESS NOTES
[facility], [place]    Progress Note - Pediatric Hematology Oncology Service        Date of Admission:  11/7/2019    Assessment & Plan   Lazaro Lund is a 19 year old male admitted on 11/07/19 for intractable nausea, abdominal pain and severe dehydration. He is currently being treated for very high risk T cell lymphoblastic lymphoma per protocol [id number] Consolidation. He was due to start Cycle 1 day 29 but this was delayed due to his presenting symptoms. Lazaro remains admitted for severe asparaginase associated pancreatitis with elevated lipase, amylase and significant abdominal pain and nausea and now with imaging consistent with pancreatic necrosis and SIRS requiring transfer to the PICU on 11/10. He has greatly improved and is stable for transfer to the floor today, to continue working on pain management, oral intake, and fluid overload.     Today:  - Transfer from PICU to floor  - Low fat diet, NJ out today  - PACCT consult for transition away from PCA to orals  - Off all antibiotics  - BID lasix for fluid overload    GI  Asparaginase associated pancreatitis - Lipase >3xULN, significant upper abdominal pain and intractable vomiting. CT abdomen on 11/10 with 3 cm area of pancreatic necrosis, GI recommending repeat scan in 2 weeks  - Repeat CT or MR 2 weeks post last one, ~11/24  - IV protonix 40mg daily - consider transition to oral tomorrow  - Monitor serial electrolytes   -Daily BMP, mag, phos  - Morphine PCA for pain management  - GI consulting appreciate recs  - Encourage enteral intake - improves outcomes  -Will need to follow up with Dr. Herb Coronel with adult gastroenterology in 4 weeks as outpatient     Nausea  -Compazine q6h PRN  -Zofran 8 mg PRN  -Ativan 2 mg PRN  -Benadryl PRN  -Scopolamine patch in place    H/O constipation - at risk of opioid induced constipation  -Miralax daily  -Senokot daily PRN     HEME/ONC  T cell lymphoblastic lymphoma - VHR - treated  per protocol [id number], due for D29 of consolidation [id number] Consolidation. Not neutropenic on admission   - Cycle 1 D29 delayed until complete control of current illness     Anemia: stabilizing  Q48H CBC    DVT of the bilateral upper extremities  -Subtherapeutic level - Increased Lovenox from 80mg q12h to 100mg q12h on 11/13  -Next Hep10a level on 11/14 at 1300  -Coags downtrending today  -INR Qmonday  -Concern for new clot on 11/13, US demonstrated stable from previous imaging, pain improved today     PCP ppx  -Bactrim QM/T      At risk for chemotherapy-induced cytopenias  - maintain hgb > 7  - maintain plt count > 30 d/t lovenox therapy     FEN:  Severe malnutrition as evidenced by < 50% of necessary intake for > 5 days, moderate fluid accumulation, and > 2% weight loss in < 1 week  Dehydration due to poor oral intake   -Stict I/O  -NJ tube out today - feed oral as able, will reassess if further   -Low fat diet + boost breeze (avoid ensure/pediasure duet to high fat content)  -PTA vitamin D    Fluid overload, Capillary Leak - previously on bumex drip, now managing with Lasix. Required lasix x3 yesterday with good output. Dry weight estimated at 75kg (last wt 11/13 80.3kg). continued electrolyte derrangements  -BID lasix while diuresing    Electrolyte derangement likely 2/2 fluid status  -Daily BMP  -Q12 mag, phos, lytes  -Nutraphos PO BID  -Replace K+ for <3.5  -Replace phos for phos <2     NEURO:  Abdominal pain 2/2 pancreatitis  -Morphine PCA - currently at 0.4mg/hr continuous + 0.5 bumps Q15 PRN  -Tylenol Q6  -PTA Melatonin   -PACCT consult for oral conversion and wean   -Plan to stop basal morphine, start Q6 oxycodone + 0.4 bumps tomorrow  -Integrative medicine consult tomorrow  -PT consult     CV:  EKG obtained 11/8 for elevated HR, noted to have prolonged QTC of 478 that increased to 528 on 11/10 initially, but then on overread was noted to be 437 so not truly prolonged. ECHO reassuring  -Echo on 11/10  WNL    RESP:   Pleural efffusions 2/2 volume overload and capillary leak  -Stable on room air  -Incentive spirometry QID    ID:   Concern for infected pancreatitis: Cefepime, metronidazole started on 11/10 due to fever - blood cultures with no growth to date afebrile x48h - now off all antibiotics  - s/p Cefepime 2 g q8h (11/10-11/13)  - s/p Metronidazole 500 mg q8h (11/10-11/13)     DVT Prophylaxis: Indicated given Enoxaparin (Lovenox) subcutaneous treatment  Lara Catheter: not present  Code Status: Full     Patient was discussed with attending Bethanie Godinez MD  PL3 - Pediatrics  [facility]  pager [phone]    I saw and evaluated the patient and agree with the resident's assessment and plan.  Bethanie Hussein MD, MPH, [facility]  Division of Pediatric Hematology/Oncology       ______________________________________________________________________    Interval History   Transferred from PICU today. Pulled NJ out this morning. Very interested in going home. Will continue to work on pain management and oral intak    Data reviewed today: I reviewed all medications, new labs and imaging results over the last 24 hours.     Physical Exam   Vital Signs: Temp: 98.8  F (37.1  C) Temp src: Oral BP: 112/78 Pulse: 110 Heart Rate: 103 Resp: 14 SpO2: 98 % O2 Device: None (Room air)    Weight: 177 lbs .47 oz    Data   Recent Labs   Lab 11/14/19  0521 11/13/19  1922 11/13/19  1224 11/13/19  0453 11/12/19  1720  11/12/19  0454  11/11/19  0411  11/09/19  0630   WBC 11.7*  --   --  9.6 9.4  --   --   --  7.6   < > 9.5   HGB 8.1*  --   --  7.8* 7.6*  --   --   --  8.1*   < > 12.6*   MCV 88  --   --  85 85  --   --   --  82   < > 81     --   --  234 222  --   --   --  223   < > 383   INR 1.09  --   --   --   --   --  1.48*  --  1.57*   < >  --     137  --  139  --    < > 140   < > 138   < > 138   POTASSIUM 4.6 3.9 4.1 3.4  --    < > 2.9*   < >  3.3*   < > 4.2   CHLORIDE 103 103  --  104  --    < > 106   < > 105   < > 106   CO2 28 31  --  32  --    < > 32   < > 29   < > 25   BUN 7  --   --  12  --   --  13   < > 11   < > 17   CR 0.41*  --   --  0.51*  --   --  0.50*   < > 0.52*   < > 0.70   ANIONGAP 4 3  --  3  --    < > 2*   < > 4   < > 7   MICHAEL 7.2*  --   --  6.4*  --   --  5.9*   < > 6.3*   < > 7.5*   *  --   --  102*  --   --  130*   < > 82   < > 114*   ALBUMIN  --   --   --   --   --   --  1.8*  --  2.1*   < > 2.8*   PROTTOTAL  --   --   --   --   --   --  3.3*  --  3.6*   < >  --    BILITOTAL  --   --   --   --   --   --  1.0  --  1.3   < >  --    ALKPHOS  --   --   --   --   --   --  115  --  95   < >  --    ALT  --   --   --   --   --   --  42  --  58   < >  --    AST  --   --   --   --   --   --  27  --  51*   < >  --    LIPASE  --   --   --   --   --   --   --   --  1,239*  --  4,112*    < > = values in this interval not displayed.

## 2019-11-14 NOTE — PLAN OF CARE
Pt transferred to the unit around 1300 today. Avss, No c/o pain, nausea or vomiting noted. Pt having good pain control with PCA. IVF infusing through IVAD. Lungs clear. Oxygen sats >94 % on room air. PCA weaned. Pt tolerating w/o issues. Continue with plan. Notify MD of change in status.

## 2019-11-15 ENCOUNTER — TELEPHONE (OUTPATIENT)
Dept: PHARMACY | Facility: CLINIC | Age: 19
End: 2019-11-15

## 2019-11-15 ENCOUNTER — HOSPITAL ENCOUNTER (OUTPATIENT)
Facility: CLINIC | Age: 19
End: 2019-11-15
Attending: NURSE PRACTITIONER
Payer: MEDICAID

## 2019-11-15 PROBLEM — K85.90 ACUTE PANCREATITIS: Status: ACTIVE | Noted: 2019-11-15

## 2019-11-15 PROBLEM — E86.0 DEHYDRATION: Status: RESOLVED | Noted: 2019-11-07 | Resolved: 2019-11-15

## 2019-11-15 LAB
ANION GAP SERPL CALCULATED.3IONS-SCNC: 4 MMOL/L (ref 3–14)
BUN SERPL-MCNC: 6 MG/DL (ref 7–30)
CALCIUM SERPL-MCNC: 7.3 MG/DL (ref 8.5–10.1)
CHLORIDE SERPL-SCNC: 105 MMOL/L (ref 94–109)
CO2 SERPL-SCNC: 29 MMOL/L (ref 20–32)
CREAT SERPL-MCNC: 0.51 MG/DL (ref 0.66–1.25)
GFR SERPL CREATININE-BSD FRML MDRD: >90 ML/MIN/{1.73_M2}
GLUCOSE SERPL-MCNC: 84 MG/DL (ref 70–99)
LMWH PPP CHRO-ACNC: 0.54 IU/ML
MAGNESIUM SERPL-MCNC: 1.9 MG/DL (ref 1.6–2.3)
PHOSPHATE SERPL-MCNC: 3.5 MG/DL (ref 2.5–4.5)
POTASSIUM SERPL-SCNC: 4 MMOL/L (ref 3.4–5.3)
SODIUM SERPL-SCNC: 138 MMOL/L (ref 133–144)

## 2019-11-15 PROCEDURE — 25000132 ZZH RX MED GY IP 250 OP 250 PS 637: Performed by: STUDENT IN AN ORGANIZED HEALTH CARE EDUCATION/TRAINING PROGRAM

## 2019-11-15 PROCEDURE — 85520 HEPARIN ASSAY: CPT | Performed by: STUDENT IN AN ORGANIZED HEALTH CARE EDUCATION/TRAINING PROGRAM

## 2019-11-15 PROCEDURE — 80048 BASIC METABOLIC PNL TOTAL CA: CPT | Performed by: STUDENT IN AN ORGANIZED HEALTH CARE EDUCATION/TRAINING PROGRAM

## 2019-11-15 PROCEDURE — 25000125 ZZHC RX 250: Performed by: STUDENT IN AN ORGANIZED HEALTH CARE EDUCATION/TRAINING PROGRAM

## 2019-11-15 PROCEDURE — 84100 ASSAY OF PHOSPHORUS: CPT | Performed by: STUDENT IN AN ORGANIZED HEALTH CARE EDUCATION/TRAINING PROGRAM

## 2019-11-15 PROCEDURE — 83735 ASSAY OF MAGNESIUM: CPT | Performed by: STUDENT IN AN ORGANIZED HEALTH CARE EDUCATION/TRAINING PROGRAM

## 2019-11-15 PROCEDURE — 25000128 H RX IP 250 OP 636: Performed by: STUDENT IN AN ORGANIZED HEALTH CARE EDUCATION/TRAINING PROGRAM

## 2019-11-15 PROCEDURE — 36592 COLLECT BLOOD FROM PICC: CPT | Performed by: STUDENT IN AN ORGANIZED HEALTH CARE EDUCATION/TRAINING PROGRAM

## 2019-11-15 PROCEDURE — 25000132 ZZH RX MED GY IP 250 OP 250 PS 637: Performed by: PEDIATRICS

## 2019-11-15 PROCEDURE — 36415 COLL VENOUS BLD VENIPUNCTURE: CPT | Performed by: STUDENT IN AN ORGANIZED HEALTH CARE EDUCATION/TRAINING PROGRAM

## 2019-11-15 PROCEDURE — 12000014 ZZH R&B PEDS UMMC

## 2019-11-15 RX ORDER — ACETAMINOPHEN 325 MG/1
650 TABLET ORAL EVERY 6 HOURS PRN
Qty: 60 TABLET | Refills: 0 | Status: SHIPPED | OUTPATIENT
Start: 2019-11-15 | End: 2020-01-02

## 2019-11-15 RX ORDER — ONDANSETRON 4 MG/1
8 TABLET, ORALLY DISINTEGRATING ORAL EVERY 6 HOURS PRN
Qty: 30 TABLET | Refills: 0 | Status: SHIPPED | OUTPATIENT
Start: 2019-11-15 | End: 2019-11-15

## 2019-11-15 RX ORDER — ONDANSETRON 8 MG/1
8 TABLET, ORALLY DISINTEGRATING ORAL EVERY 6 HOURS PRN
Qty: 20 TABLET | Refills: 0 | Status: SHIPPED | OUTPATIENT
Start: 2019-11-15 | End: 2019-12-12

## 2019-11-15 RX ORDER — PANTOPRAZOLE SODIUM 20 MG/1
40 TABLET, DELAYED RELEASE ORAL
Status: DISCONTINUED | OUTPATIENT
Start: 2019-11-15 | End: 2019-11-17 | Stop reason: HOSPADM

## 2019-11-15 RX ORDER — OXYCODONE HYDROCHLORIDE 5 MG/1
5 TABLET ORAL EVERY 6 HOURS
Status: DISCONTINUED | OUTPATIENT
Start: 2019-11-15 | End: 2019-11-15

## 2019-11-15 RX ORDER — ONDANSETRON 4 MG/1
4 TABLET, ORALLY DISINTEGRATING ORAL EVERY 6 HOURS PRN
Status: DISCONTINUED | OUTPATIENT
Start: 2019-11-15 | End: 2019-11-17 | Stop reason: HOSPADM

## 2019-11-15 RX ORDER — LORAZEPAM 1 MG/1
1 TABLET ORAL EVERY 4 HOURS PRN
Status: DISCONTINUED | OUTPATIENT
Start: 2019-11-15 | End: 2019-11-17 | Stop reason: HOSPADM

## 2019-11-15 RX ORDER — DRONABINOL 2.5 MG/1
2.5 CAPSULE ORAL 2 TIMES DAILY
Status: DISCONTINUED | OUTPATIENT
Start: 2019-11-15 | End: 2019-11-17 | Stop reason: HOSPADM

## 2019-11-15 RX ORDER — OXYCODONE HYDROCHLORIDE 5 MG/1
5 TABLET ORAL EVERY 4 HOURS
Status: DISCONTINUED | OUTPATIENT
Start: 2019-11-15 | End: 2019-11-15

## 2019-11-15 RX ORDER — FUROSEMIDE 20 MG/1
10 TABLET ORAL ONCE
Status: COMPLETED | OUTPATIENT
Start: 2019-11-15 | End: 2019-11-15

## 2019-11-15 RX ORDER — DRONABINOL 2.5 MG/1
2.5 CAPSULE ORAL 2 TIMES DAILY
Qty: 60 CAPSULE | Refills: 0 | Status: SHIPPED | OUTPATIENT
Start: 2019-11-15 | End: 2019-11-19

## 2019-11-15 RX ORDER — PANTOPRAZOLE SODIUM 40 MG/1
40 TABLET, DELAYED RELEASE ORAL
Qty: 30 TABLET | Refills: 0 | Status: SHIPPED | OUTPATIENT
Start: 2019-11-15 | End: 2020-02-04

## 2019-11-15 RX ADMIN — Medication 2.5 MG: at 14:17

## 2019-11-15 RX ADMIN — Medication 2.5 MG: at 19:00

## 2019-11-15 RX ADMIN — ACETAMINOPHEN 650 MG: 325 TABLET, FILM COATED ORAL at 01:46

## 2019-11-15 RX ADMIN — SODIUM CHLORIDE, PRESERVATIVE FREE 3 ML: 5 INJECTION INTRAVENOUS at 09:33

## 2019-11-15 RX ADMIN — SCOPALAMINE 1 PATCH: 1 PATCH, EXTENDED RELEASE TRANSDERMAL at 22:33

## 2019-11-15 RX ADMIN — DRONABINOL 2.5 MG: 2.5 CAPSULE ORAL at 11:23

## 2019-11-15 RX ADMIN — MELATONIN TAB 3 MG 3 MG: 3 TAB at 22:33

## 2019-11-15 RX ADMIN — ACETAMINOPHEN 650 MG: 325 TABLET, FILM COATED ORAL at 20:34

## 2019-11-15 RX ADMIN — Medication 7.5 MG: at 20:34

## 2019-11-15 RX ADMIN — Medication 10 MG: at 11:00

## 2019-11-15 RX ADMIN — Medication 7.5 MG: at 16:06

## 2019-11-15 RX ADMIN — OXYCODONE HYDROCHLORIDE 5 MG: 5 TABLET ORAL at 12:32

## 2019-11-15 RX ADMIN — ACETAMINOPHEN 650 MG: 325 TABLET, FILM COATED ORAL at 14:05

## 2019-11-15 RX ADMIN — POTASSIUM & SODIUM PHOSPHATES POWDER PACK 280-160-250 MG 1 PACKET: 280-160-250 PACK at 08:46

## 2019-11-15 RX ADMIN — DIPHENHYDRAMINE HYDROCHLORIDE 25 MG: 25 CAPSULE ORAL at 04:07

## 2019-11-15 RX ADMIN — ACETAMINOPHEN 650 MG: 325 TABLET, FILM COATED ORAL at 08:45

## 2019-11-15 RX ADMIN — Medication 2.5 MG: at 11:00

## 2019-11-15 RX ADMIN — ENOXAPARIN SODIUM 100 MG: 100 INJECTION SUBCUTANEOUS at 20:35

## 2019-11-15 RX ADMIN — ENOXAPARIN SODIUM 100 MG: 100 INJECTION SUBCUTANEOUS at 08:45

## 2019-11-15 RX ADMIN — HEPARIN, PORCINE (PF) 10 UNIT/ML INTRAVENOUS SYRINGE 5 ML: at 13:09

## 2019-11-15 RX ADMIN — Medication 2.5 MG: at 22:58

## 2019-11-15 RX ADMIN — DRONABINOL 2.5 MG: 2.5 CAPSULE ORAL at 20:34

## 2019-11-15 RX ADMIN — PANTOPRAZOLE SODIUM 40 MG: 20 TABLET, DELAYED RELEASE ORAL at 08:45

## 2019-11-15 RX ADMIN — OXYCODONE HYDROCHLORIDE 5 MG: 5 TABLET ORAL at 08:45

## 2019-11-15 RX ADMIN — MELATONIN 2000 UNITS: at 08:45

## 2019-11-15 ASSESSMENT — MIFFLIN-ST. JEOR
SCORE: 1793.51
SCORE: 1793.51

## 2019-11-15 NOTE — DISCHARGE INSTRUCTIONS
For temperature >100.5, increased nausea, vomiting, pain or any other concerns, please call [phone] & ask to talk to the Pediatric Oncology Fellow On Call.    Tuesday, November 19 @ 1 PM - [facility] for labs & exam.    Thursday, November 21  -  You will be called with pre-procedure instructions.  -  Arrive in Peds Sedation @ 11 AM, LP scheduled for 12 noon.  -  [facility] @ 2 PM for chemo.

## 2019-11-15 NOTE — PLAN OF CARE
Afebrile, slightly tachycardic. OVSS. No c/o pain or nausea. PCA infusing with no issues. PRN benadryl x1 to aid with sleep. Good UOP, no stool. MIVF infusing with no issue. Mom at the bedside. Hourly rounding complete. Will continue to monitor.

## 2019-11-15 NOTE — PROGRESS NOTES
[facility] [place]    Progress Note - Pediatric Hematology Oncology Service        Date of Admission:  11/7/2019    Assessment & Plan   Lazaro Lund is a 19 year old male admitted on 11/07/19 for intractable nausea, abdominal pain and severe dehydration. 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 He was due to start Cycle 1 day 29 but this was delayed due to his presenting symptoms. Lazaro remains admitted for severe asparaginase associated pancreatitis with elevated lipase, amylase and significant abdominal pain and nausea and now with imaging consistent with pancreatic necrosis and SIRS requiring transfer to the PICU on 11/10 for 4 days. He has greatly improved we will continue to work on transition or oral medications, pain management, oral intake, and fluid overload.     Today:  Marinol for nausea 2.5mg BID  All anti-nausea meds to PO today  Lasix 10mg oral, then PRN  Labs to Qday  Nuetrophos packets to daily  protonix to PO  PCA off, transition to all orals per patient request - Oxycodone 5mg Q6 + 2.5mg Q4 PRN  GI  Asparaginase associated pancreatitis - Lipase >3xULN, significant upper abdominal pain and intractable vomiting. CT abdomen on 11/10 with 3 cm area of pancreatic necrosis, GI recommending repeat scan in 2 weeks  - Repeat CT or MR 2 weeks post last one, ~11/24  - PO protonix 40mg daily  - Monitor serial electrolytes   -Daily BMP, mag, phos  - Oxycodone for pain management  - GI consulting appreciate recs  - Encourage enteral intake - improves outcomes  - Will need to follow up with Dr. Herb Coronel with adult gastroenterology in 4 weeks as outpatient     Nausea  -Compazine q6h PRN  -Zofran 8 mg PRN  -Ativan 2 mg PRN  -Benadryl PRN  -Scopolamine patch in place  -Marinol 2.5mg BID, can increase if not effective and not experiencing side effects    H/O constipation - at risk of opioid induced  constipation  -Miralax daily  -Senokot daily PRN     HEME/ONC  T cell lymphoblastic lymphoma - VHR - treated per protocol [id number], due for D29 of consolidation [id number] Consolidation. Not neutropenic on admission   - Cycle 1 D29 delayed until complete control of current illness     Anemia: stabilizing  Q48H CBC    DVT of the bilateral upper extremities  -Subtherapeutic level - Increased Lovenox from 80mg q12h to 100mg q12h on 11/13 Xa improving  -Next Hep10a level on 11/15 at 1300  -INR Qmonday  -Concern for new clot on 11/13, US demonstrated stable from previous imaging, pain improved today     PCP ppx  -Bactrim QM/T      At risk for chemotherapy-induced cytopenias  - maintain hgb > 7  - maintain plt count > 30 d/t lovenox therapy     FEN:  Severe malnutrition as evidenced by < 50% of necessary intake for > 5 days, moderate fluid accumulation, and > 2% weight loss in < 1 week - Improving  Dehydration due to poor oral intake   -Stict I/O  -Oral feeds, encourage PO intake  -Low fat diet + boost breeze (avoid ensure/pediasure due to high fat content)  -PTA vitamin D    Fluid overload, Capillary Leak - previously on bumex drip, now managing with Lasix. Required lasix x3 yesterday with good output. Dry weight estimated at 75kg (last wt 11/13 80.3kg). - Improving  -10mg PO lasix today, then PRN    Electrolyte derangement likely 2/2 fluid status  -Daily BMP, mag, phos  -Nutraphos PO daily  -Replace K+ for <3.5  -Replace phos for phos <2     NEURO:  Abdominal pain 2/2 pancreatitis  -PCA discontinue today per patient preference - transition to oral oxycodone 5mg Q4 + 2.5mg Q4PRN  -Tylenol Q6 scheduled  -PTA Melatonin   -PACCT consulting  -Integrative medicine consult  -PT consult     CV:  EKG obtained 11/8 for elevated HR, noted to have prolonged QTC of 478 that increased to 528 on 11/10 initially, but then on overread was noted to be 437 so not truly prolonged. ECHO reassuring  -Echo on 11/10 WNL    RESP:   Pleural  efffusions 2/2 volume overload and capillary leak  -Stable on room air  -Incentive spirometry QID    ID:   Concern for infected pancreatitis: Cefepime, metronidazole started on 11/10 due to fever - blood cultures with no growth to date afebrile x48h - now off all antibiotics  - s/p Cefepime 2 g q8h (11/10-11/13)  - s/p Metronidazole 500 mg q8h (11/10-11/13)     DVT Prophylaxis: Indicated given Enoxaparin (Lovenox) subcutaneous treatment  Lara Catheter: not present  Code Status: Full     Patient was discussed with attending Bethanie Godinez MD  PL3 - Pediatrics  [facility]  pager [phone]    I saw and evaluated the patient and agree with the resident's assessment and plan.  Bethanie Hussein MD, MPH, [facility]  Division of Pediatric Hematology/Oncology       ______________________________________________________________________    Interval History   NAEO, would really like to be unhooked from fluids and PCA. Pain well managed. Feeling good today, minimal pain, hungry but still has lower intake, encouraged supplements and low fat diet    Data reviewed today: I reviewed all medications, new labs and imaging results over the last 24 hours.     Physical Exam   Vital Signs: Temp: 99  F (37.2  C) Temp src: Oral BP: 127/78 Pulse: 92 Heart Rate: 92 Resp: 18 SpO2: 95 % O2 Device: None (Room air)    Weight: 166 lbs 10.68 oz    EXAM:  GENERAL: sitting on edge of bed, awake, alert, in a good mood today  SKIN: Clear. No significant rash, abnormal pigmentation or lesions  HEAD: Normocephalic  EYES: anicteric  EARS: Normal external canals.  NOSE: Normal without discharge.  LUNGS: lungs clear without crackles. Normal WOB  HEART: Regular rhythm. Normal S1/S2. No murmurs. Normal pulses.  ABDOMEN: Soft, flat, non-distended. Slight pain with palpation of the epigastric region.  EXT: moving all extremities well, no edema or deformities  NEUROLOGIC: no focal  deficits, alert and interactive. Thought processes clear.       Data   Recent Labs   Lab 11/15/19  0536 11/14/19  1400 11/14/19  0521  11/13/19  0453 11/12/19  1720  11/12/19  0454  11/11/19  0411  11/09/19  0630   WBC  --   --  11.7*  --  9.6 9.4  --   --   --  7.6   < > 9.5   HGB  --   --  8.1*  --  7.8* 7.6*  --   --   --  8.1*   < > 12.6*   MCV  --   --  88  --  85 85  --   --   --  82   < > 81   PLT  --   --  281  --  234 222  --   --   --  223   < > 383   INR  --   --  1.09  --   --   --   --  1.48*  --  1.57*   < >  --     137 135   < > 139  --    < > 140   < > 138   < > 138   POTASSIUM 4.0 3.9 4.6   < > 3.4  --    < > 2.9*   < > 3.3*   < > 4.2   CHLORIDE 105 103 103   < > 104  --    < > 106   < > 105   < > 106   CO2 29 28 28   < > 32  --    < > 32   < > 29   < > 25   BUN 6*  --  7  --  12  --   --  13   < > 11   < > 17   CR 0.51*  --  0.41*  --  0.51*  --   --  0.50*   < > 0.52*   < > 0.70   ANIONGAP 4 6 4   < > 3  --    < > 2*   < > 4   < > 7   MICHAEL 7.3*  --  7.2*  --  6.4*  --   --  5.9*   < > 6.3*   < > 7.5*   GLC 84  --  144*  --  102*  --   --  130*   < > 82   < > 114*   ALBUMIN  --   --  2.4*  --   --   --   --  1.8*  --  2.1*   < > 2.8*   PROTTOTAL  --   --  4.9*  --   --   --   --  3.3*  --  3.6*   < >  --    BILITOTAL  --   --  0.7  --   --   --   --  1.0  --  1.3   < >  --    ALKPHOS  --   --  444*  --   --   --   --  115  --  95   < >  --    ALT  --   --  43  --   --   --   --  42  --  58   < >  --    AST  --   --  27  --   --   --   --  27  --  51*   < >  --    LIPASE  --   --   --   --   --   --   --   --   --  1,239*  --  4,112*    < > = values in this interval not displayed.

## 2019-11-15 NOTE — PLAN OF CARE
Afebrile, VSS. PCA discontinued today. Abdominal pain 5-6/10.Started on oral oxycodone, dose now increased and received PRN x2.Good fluid intake and taking small amounts of food in. Good urine output. Oral lasix given x1.No stool. Denies nausea. Patient and Mom updated on plan of care.Will continue to monitor.

## 2019-11-15 NOTE — TELEPHONE ENCOUNTER
PA Initiation    Medication: Dronabinol 2.5mg  Insurance Company: [place] Medicaid ([facility]) - Phone [phone] Fax [phone]  Pharmacy Filling the Rx: [place], [place] - [address]  Filling Pharmacy Phone: [phone]  Filling Pharmacy Fax: [phone]  Start Date: 11/15/2019      Karishma Glass  Pediatric Discharge Pharmacy  [facility]  Phone [phone]  Pager [phone]

## 2019-11-15 NOTE — PLAN OF CARE
Afebrile. Slightly tachycardic. OVSS. LS clear on RA. No c/o pain; PCA infusing with no issues. Pt asked to be unhooked from PCA x3 this evening to walk in hallways; educated on not getting PCA continuous and bumps; pt ackowledged education and verified he wanted to be unhooked; still no c/o pain throughout evening with intermittent disconnection from PCA; MD Zhong aware. Intermittent nausea; PRN ativan x2 and benadryl x1 for nausea with relief. Good UOP; no stool. Pt up and ambulating in hallways with mom. Port infusing with no issues. Mom at bedside and updated on POC for evening. Hourly rounding completed. Will continue to monitor and reassess.

## 2019-11-15 NOTE — PROGRESS NOTES
Nutrition Brief- Asked by provider at to provided education on low fat diet.  Provided written and verbal instruction on low fat diet.  Discussed low fat options and provided a copy of the low fat menu.  Pt is also drinking the boost breeze which doesn't contain fat.  Will continue to follow.    Yas Montejo, RD, LD, [facility]  [phone]

## 2019-11-16 LAB
ANION GAP SERPL CALCULATED.3IONS-SCNC: 4 MMOL/L (ref 3–14)
ANISOCYTOSIS BLD QL SMEAR: SLIGHT
BACTERIA SPEC CULT: NO GROWTH
BACTERIA SPEC CULT: NO GROWTH
BASOPHILS # BLD AUTO: 0 10E9/L (ref 0–0.2)
BASOPHILS NFR BLD AUTO: 0 %
BUN SERPL-MCNC: 6 MG/DL (ref 7–30)
CALCIUM SERPL-MCNC: 7.6 MG/DL (ref 8.5–10.1)
CHLORIDE SERPL-SCNC: 102 MMOL/L (ref 94–109)
CO2 SERPL-SCNC: 29 MMOL/L (ref 20–32)
CREAT SERPL-MCNC: 0.48 MG/DL (ref 0.66–1.25)
DIFFERENTIAL METHOD BLD: ABNORMAL
EOSINOPHIL # BLD AUTO: 0 10E9/L (ref 0–0.7)
EOSINOPHIL NFR BLD AUTO: 0 %
ERYTHROCYTE [DISTWIDTH] IN BLOOD BY AUTOMATED COUNT: 23.7 % (ref 10–15)
GFR SERPL CREATININE-BSD FRML MDRD: >90 ML/MIN/{1.73_M2}
GLUCOSE SERPL-MCNC: 88 MG/DL (ref 70–99)
HCT VFR BLD AUTO: 25.3 % (ref 40–53)
HGB BLD-MCNC: 7.8 G/DL (ref 13.3–17.7)
LMWH PPP CHRO-ACNC: 0.68 IU/ML
LYMPHOCYTES # BLD AUTO: 0.6 10E9/L (ref 0.8–5.3)
LYMPHOCYTES NFR BLD AUTO: 4.4 %
Lab: NORMAL
Lab: NORMAL
MACROCYTES BLD QL SMEAR: PRESENT
MAGNESIUM SERPL-MCNC: 2.1 MG/DL (ref 1.6–2.3)
MCH RBC QN AUTO: 28.6 PG (ref 26.5–33)
MCHC RBC AUTO-ENTMCNC: 30.8 G/DL (ref 31.5–36.5)
MCV RBC AUTO: 93 FL (ref 78–100)
METAMYELOCYTES # BLD: 0.2 10E9/L
METAMYELOCYTES NFR BLD MANUAL: 1.8 %
MICROCYTES BLD QL SMEAR: PRESENT
MONOCYTES # BLD AUTO: 1.8 10E9/L (ref 0–1.3)
MONOCYTES NFR BLD AUTO: 14 %
NEUTROPHILS # BLD AUTO: 10.5 10E9/L (ref 1.6–8.3)
NEUTROPHILS NFR BLD AUTO: 79.8 %
NRBC # BLD AUTO: 0.1 10*3/UL
NRBC BLD AUTO-RTO: 1 /100
PHOSPHATE SERPL-MCNC: 3.7 MG/DL (ref 2.5–4.5)
PLATELET # BLD AUTO: 390 10E9/L (ref 150–450)
PLATELET # BLD EST: ABNORMAL 10*3/UL
POLYCHROMASIA BLD QL SMEAR: SLIGHT
POTASSIUM SERPL-SCNC: 4 MMOL/L (ref 3.4–5.3)
RBC # BLD AUTO: 2.73 10E12/L (ref 4.4–5.9)
SODIUM SERPL-SCNC: 135 MMOL/L (ref 133–144)
SPECIMEN SOURCE: NORMAL
SPECIMEN SOURCE: NORMAL
WBC # BLD AUTO: 13.2 10E9/L (ref 4–11)

## 2019-11-16 PROCEDURE — 99232 SBSQ HOSP IP/OBS MODERATE 35: CPT | Performed by: NURSE PRACTITIONER

## 2019-11-16 PROCEDURE — 85025 COMPLETE CBC W/AUTO DIFF WBC: CPT | Performed by: STUDENT IN AN ORGANIZED HEALTH CARE EDUCATION/TRAINING PROGRAM

## 2019-11-16 PROCEDURE — 36415 COLL VENOUS BLD VENIPUNCTURE: CPT | Performed by: STUDENT IN AN ORGANIZED HEALTH CARE EDUCATION/TRAINING PROGRAM

## 2019-11-16 PROCEDURE — 25000128 H RX IP 250 OP 636: Performed by: STUDENT IN AN ORGANIZED HEALTH CARE EDUCATION/TRAINING PROGRAM

## 2019-11-16 PROCEDURE — 25000132 ZZH RX MED GY IP 250 OP 250 PS 637: Performed by: STUDENT IN AN ORGANIZED HEALTH CARE EDUCATION/TRAINING PROGRAM

## 2019-11-16 PROCEDURE — 25000132 ZZH RX MED GY IP 250 OP 250 PS 637: Performed by: PEDIATRICS

## 2019-11-16 PROCEDURE — 83735 ASSAY OF MAGNESIUM: CPT | Performed by: STUDENT IN AN ORGANIZED HEALTH CARE EDUCATION/TRAINING PROGRAM

## 2019-11-16 PROCEDURE — 80048 BASIC METABOLIC PNL TOTAL CA: CPT | Performed by: STUDENT IN AN ORGANIZED HEALTH CARE EDUCATION/TRAINING PROGRAM

## 2019-11-16 PROCEDURE — 84100 ASSAY OF PHOSPHORUS: CPT | Performed by: STUDENT IN AN ORGANIZED HEALTH CARE EDUCATION/TRAINING PROGRAM

## 2019-11-16 PROCEDURE — 12000014 ZZH R&B PEDS UMMC

## 2019-11-16 PROCEDURE — 85520 HEPARIN ASSAY: CPT | Performed by: PEDIATRICS

## 2019-11-16 PROCEDURE — 36592 COLLECT BLOOD FROM PICC: CPT | Performed by: PEDIATRICS

## 2019-11-16 RX ORDER — OXYCODONE HYDROCHLORIDE 10 MG/1
10 TABLET ORAL EVERY 4 HOURS PRN
Qty: 42 TABLET | Refills: 0 | Status: SHIPPED | OUTPATIENT
Start: 2019-11-16 | End: 2020-01-02

## 2019-11-16 RX ORDER — OXYCODONE HYDROCHLORIDE 5 MG/1
10 TABLET ORAL EVERY 4 HOURS
Status: DISCONTINUED | OUTPATIENT
Start: 2019-11-16 | End: 2019-11-17 | Stop reason: HOSPADM

## 2019-11-16 RX ADMIN — PANTOPRAZOLE SODIUM 40 MG: 20 TABLET, DELAYED RELEASE ORAL at 06:57

## 2019-11-16 RX ADMIN — Medication 2.5 MG: at 07:00

## 2019-11-16 RX ADMIN — Medication 7.5 MG: at 08:36

## 2019-11-16 RX ADMIN — HEPARIN, PORCINE (PF) 10 UNIT/ML INTRAVENOUS SYRINGE 5 ML: at 05:22

## 2019-11-16 RX ADMIN — ENOXAPARIN SODIUM 100 MG: 100 INJECTION SUBCUTANEOUS at 20:09

## 2019-11-16 RX ADMIN — DRONABINOL 2.5 MG: 2.5 CAPSULE ORAL at 08:36

## 2019-11-16 RX ADMIN — ACETAMINOPHEN 650 MG: 325 TABLET, FILM COATED ORAL at 15:39

## 2019-11-16 RX ADMIN — ENOXAPARIN SODIUM 100 MG: 100 INJECTION SUBCUTANEOUS at 08:43

## 2019-11-16 RX ADMIN — OXYCODONE HYDROCHLORIDE 10 MG: 5 TABLET ORAL at 20:08

## 2019-11-16 RX ADMIN — HEPARIN, PORCINE (PF) 10 UNIT/ML INTRAVENOUS SYRINGE 5 ML: at 13:01

## 2019-11-16 RX ADMIN — ACETAMINOPHEN 650 MG: 325 TABLET, FILM COATED ORAL at 04:14

## 2019-11-16 RX ADMIN — ACETAMINOPHEN 650 MG: 325 TABLET, FILM COATED ORAL at 09:58

## 2019-11-16 RX ADMIN — Medication 7.5 MG: at 04:14

## 2019-11-16 RX ADMIN — POTASSIUM & SODIUM PHOSPHATES POWDER PACK 280-160-250 MG 1 PACKET: 280-160-250 PACK at 08:36

## 2019-11-16 RX ADMIN — MELATONIN TAB 3 MG 3 MG: 3 TAB at 20:08

## 2019-11-16 RX ADMIN — OXYCODONE HYDROCHLORIDE 10 MG: 5 TABLET ORAL at 12:01

## 2019-11-16 RX ADMIN — MELATONIN 2000 UNITS: at 08:36

## 2019-11-16 RX ADMIN — DRONABINOL 2.5 MG: 2.5 CAPSULE ORAL at 20:09

## 2019-11-16 RX ADMIN — Medication 7.5 MG: at 00:43

## 2019-11-16 RX ADMIN — OXYCODONE HYDROCHLORIDE 10 MG: 5 TABLET ORAL at 16:05

## 2019-11-16 ASSESSMENT — MIFFLIN-ST. JEOR: SCORE: 1775.51

## 2019-11-16 NOTE — CONSULTS
Pediatric Integrative Medicine Initial Consultation    Primary Care provider: Rodriguez Montoya  Consulting Provider: Marely Godinez MD and Bethanie Hussein MD    Reason for consultation: I was asked to see this patient for non-pharmacologic recommendations that may be of assistance for pain management associated with pancreatitis.     Assessment:  Lazaro is a 19 year old male patient with a history of T-cell lymphoblastic lymphoma and severe asparaginase associated pancreatitis who currently complains of nausea and abdominal pain.     Plan:  1. Introduced the Pediatric Integrative Health and Wellbeing Program and the different services we can provide.     2. Nausea and abdominal pain  -Provided Lazaro's mother with sea-bands and education on correct application for acupressure point P6 indicated for nausea.  -Provided Lazaro's mother with aromahalers of sweet orange, citrus, and annalisa-ease all indicated for nausea.    3. Additional recommendations  We would love to see Lazaro in the Pediatric Integrative Health and Wellbeing outpatient clinic for continued suppor. Please have fatmily call [facility] at [phone] to arrange for this appointment if family and Lzaaro agreeable to recommendation.     Follow-up:  We will continue to support during this admission as is clinically possible.     Thank you for this consultation. Please do not hesitate to contact me with any questions or concerns.     History of Present Illness: Lazaro Lund is a 19 year old male who was admitted on 11/07/19 for intractable nausea, abdominal pain and severe dehydration. 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 He remains admitted for recovery following severe asparaginase associated pancreatitis. He complains today of intermittent nausea and abdominal pain.     Lazaro was asleep during this visit. The interaction was held with Lazaro's mother to allow him to sleep, as she had  reported that he was in dire need of a nap today. Patient's mother reports that Lazaro is very interested in Integrative modalities and would really like to come back to see a provider on our team in [facility].     Review of systems: The Comprehensive ROS was performed and is negative except as noted below and in the HPI.    ALLERGIES:  Allergies   Allergen Reactions     No Known Drug Allergies        IMMUNIZATIONS:  Immunization History   Administered Date(s) Administered     DTAP (<7y) 1998, 04/27/1999, 02/10/2000, 11/05/2002, 08/25/2003     HEPA 08/19/2010, 04/11/2011     HepB 1998, 1998, 04/27/1999     Hib (PRP-T) 1998, 04/27/1999, 02/10/2000     Influenza Vaccine IM > 6 months Valent IIV4 10/03/2019     MMR 02/10/2000, 08/25/2003     Meningococcal (Menactra ) 08/19/2010     OPV, trivalent, live 04/27/1999     Poliovirus, inactivated (IPV) 1998, 02/10/2000, 08/25/2003     TDAP Vaccine (Adacel) 04/11/2011     Varicella 08/19/2010, 04/11/2011       CURRENT MEDICATIONS:  Current Facility-Administered Medications   Medication     acetaminophen (TYLENOL) tablet 650 mg     diphenhydrAMINE (BENADRYL) injection 25-50 mg    Or     diphenhydrAMINE (BENADRYL) capsule 25-50 mg     dronabinol (MARINOL) capsule 2.5 mg     enoxaparin ANTICOAGULANT (LOVENOX) injection 100 mg     heparin 100 UNIT/ML injection 5 mL     heparin 100 UNIT/ML injection 5 mL     heparin lock flush 10 UNIT/ML injection 3-6 mL     heparin lock flush 10 UNIT/ML injection 3-6 mL     LORazepam (ATIVAN) tablet 1 mg     melatonin tablet 3 mg     naloxone (NARCAN) injection 0.1-0.4 mg     ondansetron (ZOFRAN-ODT) ODT tab 4 mg     oxyCODONE (ROXICODONE) tablet 10 mg     pantoprazole (PROTONIX) EC tablet 40 mg     polyethylene glycol (MIRALAX/GLYCOLAX) Packet 17 g     potassium & sodium phosphates (NEUTRA-PHOS) Packet 1 packet     scopolamine (TRANSDERM) 72 hr patch 1 patch     scopolamine (TRANSDERM-SCOP) Patch in Place      scopolamine (TRANSDERM-SCOP) patch REMOVAL     sennosides (SENOKOT) tablet 2 tablet     sodium chloride (PF) 0.9% PF flush 0.2-10 mL     sodium chloride (PF) 0.9% PF flush 10 mL     sodium chloride (PF) 0.9% PF flush 3 mL     sodium chloride (PF) 0.9% PF flush 3 mL     sodium chloride 0.9% infusion     sulfamethoxazole-trimethoprim (BACTRIM DS/SEPTRA DS) 800-160 MG per tablet 1 tablet     Vitamin D3 (CHOLECALCIFEROL) 25 mcg (1000 units) tablet 2,000 Units       PAST MEDICAL HISTORY:  Active Ambulatory Problems     Diagnosis Date Noted     T lymphoblastic lymphoma (H) 09/05/2019     DVT (deep venous thrombosis) (H) 09/26/2019     Cushingoid side effect of steroids (H) 10/17/2019     Anticoagulated 10/17/2019     Vitamin D deficiency 10/17/2019     Port-A-Cath in place 10/31/2019     Immunosuppressed due to chemotherapy 10/31/2019     Resolved Ambulatory Problems     Diagnosis Date Noted     Acute conjunctivitis 11/28/2003     Migraine 01/01/2006     Migraine 01/01/2006     Folliculitis 10/17/2019     Edema of upper extremity 10/17/2019     Past Medical History:   Diagnosis Date     DVT of upper extremity (deep vein thrombosis) (H) 09/26/2019       PAST SURGICAL HISTORY:  Past Surgical History:   Procedure Laterality Date     BONE MARROW BIOPSY, BONE SPECIMEN, NEEDLE/TROCAR Left 9/1/2019    Procedure: BIOPSY, BONE MARROW;  Surgeon: Heather Lopez MD;  Location: [facility] OR     INSERT PICC LINE N/A 8/31/2019    Procedure: INSERTION, PICC;  Surgeon: Michell Keith MD;  [facility]: [facility] OR     INSERT PORT VASCULAR ACCESS N/A 10/24/2019    Procedure: INSERTION, VASCULAR ACCESS PORT;  Surgeon: Silviano Martins MD;  [facility]: [facility] PEDS SED[facility]      IR CHEST PORT PLACEMENT > 5 YRS OF AGE  10/24/2019     IR CHEST TUBE PLACEMENT NON-TUNNELLED LEFT  8/31/2019     IR PICC PLACEMENT > 5 YRS OF AGE  8/31/2019     SPINAL PUNCTURE,LUMBAR, INTRATHECAL CHEMO DELIVERY N/A 8/31/2019    Procedure: LUMBAR PUNCTURE, WITH INTRATHECAL  CHEMOTHERAPY ADMINISTRATION;  Surgeon: Heather Lopez MD;  [facility] OR     SPINAL PUNCTURE,LUMBAR, INTRATHECAL CHEMO DELIVERY N/A 9/9/2019    Procedure: Lumbar Puncture With Intrathecal Chemo;  Surgeon: Alexi Hayes MD;  [facility]     SPINAL PUNCTURE,LUMBAR, INTRATHECAL CHEMO DELIVERY N/A 10/10/2019    Procedure: Lumbar puncture with IT Chemo (CD);  Surgeon: Reynaldo Campuzano MD;  [facility]      SPINAL PUNCTURE,LUMBAR, INTRATHECAL CHEMO DELIVERY N/A 10/17/2019    Procedure: Lumbar puncture with IT Chemo (not CD);  Surgeon: Reynaldo Campuzano MD;  [facility]      SPINAL PUNCTURE,LUMBAR, INTRATHECAL CHEMO DELIVERY N/A 10/24/2019    Procedure: LUMBAR PUNCTURE, WITH INTRATHECAL CHEMOTHERAPY ADMINISTRATION;  Surgeon: Félix Van, YOHAN CNP;  [facility]      SPINAL PUNCTURE,LUMBAR, INTRATHECAL CHEMO DELIVERY N/A 10/31/2019    Procedure: Lumbar puncture with IT Chemo (not CD);  Surgeon: Reynaldo Campuzano MD;  [facility]      THORACENTESIS N/A 8/31/2019    Procedure: Thoracentesis;  Surgeon: Michell Keith MD;  [facility]       FAMILY HISTORY:  Family History   Problem Relation Age of Onset     No Known Problems Mother      No Known Problems Father      Asthma Brother      Thyroid Cancer Paternal Grandmother      Melanoma Paternal Aunt        SOCIAL HISTORY:  Social History     Social History Narrative    Lives at home in [place] with Dad and Step-Mom. Biological mother is involved in his life and accompanied him during this hospitalization. Has 4 step-siblings and 1 biological sibling. Currently works at a [employer] in 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 Has completed high school.      Physical Exam:   Temp:  [98.7  F (37.1  C)-100.5  F (38.1  C)] 99.9  F (37.7  C)  Pulse:  [] 120  Heart Rate:  [] 111  Resp:  " [20-24] 24  BP: (113-132)/(67-89) 132/89  SpO2:  [94 %-99 %] 99 %  /89   Pulse 120   Temp 99.9  F (37.7  C) (Oral)   Resp 24   Ht 1.82 m (5' 11.65\")   Wt 75.6 kg (166 lb 10.7 oz)   SpO2 99%   BMI 22.82 kg/m    Vitals:    11/14/19 1656 11/15/19 0900 11/15/19 1500   Weight: 76 kg (167 lb 8.8 oz) 75.6 kg (166 lb 10.7 oz) 75.6 kg (166 lb 10.7 oz)        @  Wt Readings from Last 3 Encounters:   11/15/19 75.6 kg (166 lb 10.7 oz)   11/07/19 72.4 kg (159 lb 9.8 oz)   10/31/19 73.1 kg (161 lb 2.5 oz)     Ht Readings from Last 2 Encounters:   11/07/19 1.82 m (5' 11.65\")   11/07/19 1.833 m (6' 0.17\")     Normalized BMI data available only for age 0 to 20 years.         GENERAL: Alert, no acute distress. Sleeping on right side in hospital bed. Appears content and relaxed.   HEAD: Normocephalic.   LUNGS: Unlabored respirations on room air    Labs and Tests:  Results for orders placed or performed during the hospital encounter of 11/07/19 (from the past 24 hour(s))   CBC with platelets differential   Result Value Ref Range    WBC 13.2 (H) 4.0 - 11.0 10e9/L    RBC Count 2.73 (L) 4.4 - 5.9 10e12/L    Hemoglobin 7.8 (L) 13.3 - 17.7 g/dL    Hematocrit 25.3 (L) 40.0 - 53.0 %    MCV 93 78 - 100 fl    MCH 28.6 26.5 - 33.0 pg    MCHC 30.8 (L) 31.5 - 36.5 g/dL    RDW 23.7 (H) 10.0 - 15.0 %    Platelet Count 390 150 - 450 10e9/L    Diff Method Manual Differential     % Neutrophils 79.8 %    % Lymphocytes 4.4 %    % Monocytes 14.0 %    % Eosinophils 0.0 %    % Basophils 0.0 %    % Metamyelocytes 1.8 %    Nucleated RBCs 1 (H) 0 /100    Absolute Neutrophil 10.5 (H) 1.6 - 8.3 10e9/L    Absolute Lymphocytes 0.6 (L) 0.8 - 5.3 10e9/L    Absolute Monocytes 1.8 (H) 0.0 - 1.3 10e9/L    Absolute Eosinophils 0.0 0.0 - 0.7 10e9/L    Absolute Basophils 0.0 0.0 - 0.2 10e9/L    Absolute Metamyelocytes 0.2 (H) 0 10e9/L    Absolute Nucleated RBC 0.1     Anisocytosis Slight     Polychromasia Slight     Microcytes Present     Macrocytes Present  "    Platelet Estimate Confirming automated cell count    Basic metabolic panel   Result Value Ref Range    Sodium 135 133 - 144 mmol/L    Potassium 4.0 3.4 - 5.3 mmol/L    Chloride 102 94 - 109 mmol/L    Carbon Dioxide 29 20 - 32 mmol/L    Anion Gap 4 3 - 14 mmol/L    Glucose 88 70 - 99 mg/dL    Urea Nitrogen 6 (L) 7 - 30 mg/dL    Creatinine 0.48 (L) 0.66 - 1.25 mg/dL    GFR Estimate >90 >60 mL/min/[1.73_m2]    GFR Estimate If Black >90 >60 mL/min/[1.73_m2]    Calcium 7.6 (L) 8.5 - 10.1 mg/dL   Magnesium   Result Value Ref Range    Magnesium 2.1 1.6 - 2.3 mg/dL   Phosphorus   Result Value Ref Range    Phosphorus 3.7 2.5 - 4.5 mg/dL   Heparin 10a Level   Result Value Ref Range    Heparin 10A Level 0.68 IU/mL        Time Spent on this Encounter   I, Marie Posey, spent a total of 20 minutes face-to-face bedside and on the inpatient unit today managing the care of Lazarojustin Lund. Over 50% of my time on the unit was spent counseling the patient-family on non-pharmacologic strategies for nausea and abdominal pain and coordinating care with bedside RN and primary team. See note for details.    YOHAN Hernandes, CPANGELY, HNB-BC  Pediatric Nurse Practitioner  Pediatric Integrative Health and Wellbeing, [facility]    CC  Patient Care Team:  Rodriguez Montoya as PCP - General (Family Practice)  FAUSTO OROPEZA

## 2019-11-16 NOTE — PROGRESS NOTES
On-call pain physician note  Called by the primary service for recommendation regarding discharge medications.  Patient has been  taking oxycodone 7.5 mg scheduled every 4 hours and oxycodone 2.5 mg every 4 hours as needed.  The total daily dose is approximately 50 mg of oxycodone.  Recommend to discharge the patient with oxycodone 10 mg p.o. every 4 hours as needed with advice to taper gradually by taking off 1 tablet every other day if possible.  Patient will  need 7 days supply of the oxycodone and will follow-up in 7 days after discharge at an outpatient clinic either with the PCP or pain provider.    Shai Alvarado MD, PhD    [facility]GICAL UNIT [address]  [address] [place] [id number]  [phone]  Dept: [phone]

## 2019-11-16 NOTE — PROGRESS NOTES
[facility], [place]    Progress Note - Pediatric Hematology Oncology Service        Date of Admission:  11/7/2019    Assessment & Plan   Lazaro Lund is a 19 year old male admitted on 11/07/19 for intractable nausea, abdominal pain and severe dehydration. 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 He was due to start Cycle 1 day 29 but this was delayed due to his presenting symptoms. Lazaro remains admitted for severe asparaginase associated pancreatitis with elevated lipase, amylase and significant abdominal pain and nausea and now with imaging consistent with pancreatic necrosis and SIRS requiring transfer to the PICU on 11/10 for 4 days. He has greatly improved we will continue to work on pain management, oral intake, and fluid overload.     Today:  - Trial oxycodone 10mg Q4 scheduled, discontinue prn. Will assess pain control this evening and adjust as needed  - Tmax was 100.5 last night with slight increase in WBC today. Clinically well appearing with no fevers today, continue to monitor  - Hep 10A level today was 0.68, no changes made    GI  Asparaginase associated pancreatitis - Lipase >3xULN, significant upper abdominal pain and intractable vomiting. CT abdomen on 11/10 with 3 cm area of pancreatic necrosis, GI recommending repeat scan in 2 weeks  - Repeat CT or MR 2 weeks post last one, ~11/24  - PO protonix 40mg daily  - Monitor serial electrolytes   -Daily BMP, mag, phos  - Oxycodone for pain management  - GI consulting appreciate recs  - Encourage enteral intake - improves outcomes  - Will need to follow up with Dr. Herb Coronel with adult gastroenterology in 4 weeks as outpatient     Nausea  -Compazine q6h PRN  -Zofran 8 mg PRN  -Ativan 2 mg PRN  -Benadryl PRN  -Scopolamine patch in place  -Marinol 2.5mg BID, can increase if not effective and not experiencing side effects    H/O constipation -  at risk of opioid induced constipation  -Miralax daily  -Senokot daily PRN     HEME/ONC  T cell lymphoblastic lymphoma - VHR - treated per protocol [id number], due for D29 of consolidation [id number] Consolidation. Not neutropenic on admission   - Cycle 1 D29 delayed until complete control of current illness     Anemia: stabilizing  Q48H CBC    DVT of the bilateral upper extremities  -Subtherapeutic level - Increased Lovenox from 80mg q12h to 100mg q12h on 11/13, Xa improving  -Hep10a level today 11/17 at 1300, no changes today. Recheck tomorrow 11/18  -INR Qmonday  -Concern for new clot on 11/13, US demonstrated stable from previous imaging, pain improved today     PCP ppx  -Bactrim QM/T      At risk for chemotherapy-induced cytopenias  - maintain hgb > 7  - maintain plt count > 30 d/t lovenox therapy     FEN:  Severe malnutrition as evidenced by < 50% of necessary intake for > 5 days, moderate fluid accumulation, and > 2% weight loss in < 1 week - Improving  Dehydration due to poor oral intake   -Stict I/O  -Oral feeds, encourage PO intake  -Low fat diet + boost breeze (avoid ensure/pediasure due to high fat content)  -PTA vitamin D    Fluid overload, Capillary Leak - previously on bumex drip, now managing with Lasix. Required lasix x3 yesterday with good output. Dry weight estimated at 75kg (last wt 11/13 80.3kg). - Improving  -10mg PO lasix PRN    Electrolyte derangement likely 2/2 fluid status  -Daily BMP, mag, phos  -Nutraphos PO daily  -Replace K+ for <3.5  -Replace phos for phos <2     NEURO:  Abdominal pain 2/2 pancreatitis  - Oxycodone 10mg Q4 hours scheduled, discontinue prns  -Tylenol Q6 scheduled  -PTA Melatonin   -PACCT consulting  -Integrative medicine consult  -PT consult     CV:  EKG obtained 11/8 for elevated HR, noted to have prolonged QTC of 478 that increased to 528 on 11/10 initially, but then on overread was noted to be 437 so not truly prolonged. ECHO reassuring  -Echo on 11/10 WNL    RESP:    Pleural efffusions 2/2 volume overload and capillary leak  -Stable on room air  -Incentive spirometry QID    ID:   Concern for infected pancreatitis: Cefepime, metronidazole started on 11/10 due to fever - blood cultures with no growth to date afebrile x48h - now off all antibiotics  - s/p Cefepime 2 g q8h (11/10-11/13)  - s/p Metronidazole 500 mg q8h (11/10-11/13)     DVT Prophylaxis: Indicated given Enoxaparin (Lovenox) subcutaneous treatment  Lara Catheter: not present  Code Status: Full     Patient was discussed with attending Bethanie Hussein and the Fellow Dr. Barillas.    Nilda Cooper M.D., PGY-1  Pediatrics Resident  [facility]    I saw and evaluated the patient and agree with the resident's assessment and plan.  Bethanie Hussein MD, MPH, [facility]  Division of Pediatric Hematology/Oncology    ______________________________________________________________________    Interval History   NAEO, Tmax 100.5 overnight, MD not notified and no actions taken. No fevers since. UOP 1.0/kg/day yesterday, good liquid PO intake yesterday. Has been trying to eat more solids and as such had increased abdominal pain yesterday with 50mg oxycodone used total, which is a higher morphine equivalent than the previous day on a PCA. Prn 2.5 oxycodone does not help much. Tried to eat a burger and he notes this was quite fatty and he did not finish it, he ate a salad which did not cause abdominal pain. Left shoulder pain from yesterday is improved, present now only with deep inspiration, was likely due to diaphragmatic irritation. Pain on left abdomen is 6/10 constantly.     Data reviewed today: I reviewed all medications, new labs and imaging results over the last 24 hours.     Physical Exam   Vital Signs: Temp: 98.7  F (37.1  C) Temp src: Oral BP: 115/69 Pulse: 101 Heart Rate: 101 Resp: 20 SpO2: 95 % O2 Device: None (Room air)    Weight: 166 lbs 10.68  oz    EXAM:  GENERAL: sitting in bed, awake, alert, in a good mood today  SKIN: Clear. No significant rash, abnormal pigmentation or lesions  HEAD: Normocephalic  EYES: anicteric  EARS: Normal external canals.  NOSE: Normal without discharge.  LUNGS: lungs clear without crackles. Normal WOB  HEART: Regular rhythm. Normal S1/S2. No murmurs. Normal pulses.  ABDOMEN: Soft, flat, non-distended. Slight pain with palpation of the epigastric region and left abdomen.  EXT: moving all extremities well, no edema or deformities  NEUROLOGIC: no focal deficits, alert and interactive. Thought processes clear.       Data   Recent Labs   Lab 11/16/19  0529 11/15/19  0536 11/14/19  1400 11/14/19  0521  11/13/19  0453  11/12/19  0454  11/11/19  0411   WBC 13.2*  --   --  11.7*  --  9.6   < >  --   --  7.6   HGB 7.8*  --   --  8.1*  --  7.8*   < >  --   --  8.1*   MCV 93  --   --  88  --  85   < >  --   --  82     --   --  281  --  234   < >  --   --  223   INR  --   --   --  1.09  --   --   --  1.48*  --  1.57*    138 137 135   < > 139   < > 140   < > 138   POTASSIUM 4.0 4.0 3.9 4.6   < > 3.4   < > 2.9*   < > 3.3*   CHLORIDE 102 105 103 103   < > 104   < > 106   < > 105   CO2 29 29 28 28   < > 32   < > 32   < > 29   BUN 6* 6*  --  7  --  12  --  13   < > 11   CR 0.48* 0.51*  --  0.41*  --  0.51*  --  0.50*   < > 0.52*   ANIONGAP 4 4 6 4   < > 3   < > 2*   < > 4   MICHAEL 7.6* 7.3*  --  7.2*  --  6.4*  --  5.9*   < > 6.3*   GLC 88 84  --  144*  --  102*  --  130*   < > 82   ALBUMIN  --   --   --  2.4*  --   --   --  1.8*  --  2.1*   PROTTOTAL  --   --   --  4.9*  --   --   --  3.3*  --  3.6*   BILITOTAL  --   --   --  0.7  --   --   --  1.0  --  1.3   ALKPHOS  --   --   --  444*  --   --   --  115  --  95   ALT  --   --   --  43  --   --   --  42  --  58   AST  --   --   --  27  --   --   --  27  --  51*   LIPASE  --   --   --   --   --   --   --   --   --  1,239*    < > = values in this interval not displayed.

## 2019-11-16 NOTE — PLAN OF CARE
AVSS except intermittent tachycardia. Pain 4-7 over night with scheduled and PRN Oxy used. Slept well over night otherwise. Used hot packs as well for pain. No longer reporting chest pain, only abdomen. No reports of N/V. Stooling and voiding adequately. Continuing to increase PO intake. Hourly rounding.

## 2019-11-16 NOTE — PROVIDER NOTIFICATION
Donna Barillas MD notified Pt c/o of intermittent pain in his chest, 7/10. MD came to bedside to assess.VSS and o2 sats stable on RA. Scheduled pain med given at this time per MD, no new orders. Will continue to monitor and notify MD as needed.

## 2019-11-16 NOTE — PLAN OF CARE
Tmax 99.9, other VSS.  C/o pain 4-7, oxycodone dose changed to 10 mg Q4. Pt appears more comfort on new dose.  Drinking well, but poor food intake. Denies nausea.Good urine output and no stool. Will continue to monitor and notify MD as needed.

## 2019-11-17 VITALS
SYSTOLIC BLOOD PRESSURE: 126 MMHG | RESPIRATION RATE: 20 BRPM | BODY MASS INDEX: 22.04 KG/M2 | HEART RATE: 124 BPM | WEIGHT: 162.7 LBS | HEIGHT: 72 IN | TEMPERATURE: 98.2 F | OXYGEN SATURATION: 98 % | DIASTOLIC BLOOD PRESSURE: 84 MMHG

## 2019-11-17 LAB
ANION GAP SERPL CALCULATED.3IONS-SCNC: 4 MMOL/L (ref 3–14)
BUN SERPL-MCNC: 6 MG/DL (ref 7–30)
CALCIUM SERPL-MCNC: 7.6 MG/DL (ref 8.5–10.1)
CHLORIDE SERPL-SCNC: 103 MMOL/L (ref 94–109)
CO2 SERPL-SCNC: 29 MMOL/L (ref 20–32)
CREAT SERPL-MCNC: 0.45 MG/DL (ref 0.66–1.25)
GFR SERPL CREATININE-BSD FRML MDRD: >90 ML/MIN/{1.73_M2}
GLUCOSE SERPL-MCNC: 97 MG/DL (ref 70–99)
MAGNESIUM SERPL-MCNC: 2.1 MG/DL (ref 1.6–2.3)
PHOSPHATE SERPL-MCNC: 4.2 MG/DL (ref 2.5–4.5)
POTASSIUM SERPL-SCNC: 3.8 MMOL/L (ref 3.4–5.3)
SODIUM SERPL-SCNC: 136 MMOL/L (ref 133–144)

## 2019-11-17 PROCEDURE — 25000132 ZZH RX MED GY IP 250 OP 250 PS 637: Performed by: STUDENT IN AN ORGANIZED HEALTH CARE EDUCATION/TRAINING PROGRAM

## 2019-11-17 PROCEDURE — 25000128 H RX IP 250 OP 636: Performed by: STUDENT IN AN ORGANIZED HEALTH CARE EDUCATION/TRAINING PROGRAM

## 2019-11-17 PROCEDURE — 84100 ASSAY OF PHOSPHORUS: CPT | Performed by: STUDENT IN AN ORGANIZED HEALTH CARE EDUCATION/TRAINING PROGRAM

## 2019-11-17 PROCEDURE — 83735 ASSAY OF MAGNESIUM: CPT | Performed by: STUDENT IN AN ORGANIZED HEALTH CARE EDUCATION/TRAINING PROGRAM

## 2019-11-17 PROCEDURE — 36592 COLLECT BLOOD FROM PICC: CPT | Performed by: STUDENT IN AN ORGANIZED HEALTH CARE EDUCATION/TRAINING PROGRAM

## 2019-11-17 PROCEDURE — 80048 BASIC METABOLIC PNL TOTAL CA: CPT | Performed by: STUDENT IN AN ORGANIZED HEALTH CARE EDUCATION/TRAINING PROGRAM

## 2019-11-17 RX ADMIN — ENOXAPARIN SODIUM 100 MG: 100 INJECTION SUBCUTANEOUS at 09:05

## 2019-11-17 RX ADMIN — OXYCODONE HYDROCHLORIDE 10 MG: 5 TABLET ORAL at 00:47

## 2019-11-17 RX ADMIN — POLYETHYLENE GLYCOL 3350 17 G: 17 POWDER, FOR SOLUTION ORAL at 08:02

## 2019-11-17 RX ADMIN — MELATONIN 2000 UNITS: at 08:02

## 2019-11-17 RX ADMIN — OXYCODONE HYDROCHLORIDE 10 MG: 5 TABLET ORAL at 08:02

## 2019-11-17 RX ADMIN — HEPARIN 5 ML: 100 SYRINGE at 09:47

## 2019-11-17 RX ADMIN — ACETAMINOPHEN 650 MG: 325 TABLET, FILM COATED ORAL at 09:46

## 2019-11-17 RX ADMIN — PANTOPRAZOLE SODIUM 40 MG: 20 TABLET, DELAYED RELEASE ORAL at 08:05

## 2019-11-17 RX ADMIN — DRONABINOL 2.5 MG: 2.5 CAPSULE ORAL at 08:02

## 2019-11-17 RX ADMIN — ACETAMINOPHEN 650 MG: 325 TABLET, FILM COATED ORAL at 04:44

## 2019-11-17 RX ADMIN — OXYCODONE HYDROCHLORIDE 10 MG: 5 TABLET ORAL at 04:44

## 2019-11-17 RX ADMIN — HEPARIN, PORCINE (PF) 10 UNIT/ML INTRAVENOUS SYRINGE 5 ML: at 05:37

## 2019-11-17 NOTE — DISCHARGE SUMMARY
[facility], [place]  Discharge Summary - Medicine & Pediatrics       Date of Admission:  11/7/2019  Date of Discharge:  11/17/2019  Discharging Provider: Bethanie Wong  Discharge Service: Pediatric Hematology Oncology    Discharge Diagnoses    -Asparaginase associated pancreatitis  -T lymphoblastic lymphoma  -DVT  -Immunosuppressed due to chemotherapy  -Dehydration     Follow-ups Needed After Discharge   - Will need to follow up with Dr. Herb Coronel with adult gastroenterology in 4 weeks as outpatient   - Follow up with PACCT team within 1 week for pain management  - Follow up in hematology oncology clinic on Tuesday 11/19    Unresulted Labs Ordered in the Past 30 Days of this Admission     No orders found from 10/8/2019 to 11/8/2019.      These results will be followed up by hematology oncology team    Discharge Disposition   Discharged to home  Condition at discharge: Stable    Hospital Course   Lazaro Lund is a 19 year old male admitted on 11/07/19 for abdominal pain and dehydration. 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 He was due to start Cycle 1 day 29 but this will be delayed due to his presenting symptoms.     GI  Asparaginase associated pancreatitis   On admission Danielas laboratory work was reassuring against pancreatitis with a normal amylase and lipase near the upper limit of normal. However on hospital day 2 his pain and vomiting acutely worsened and repeat lab work demonstrated lipase >3x ULN. Debra pain related to pancreatitis was managed with a morphine PCA and he was hydrated with IV fluids at 1.25-1.5x maintenance with Lactated Ringers. His nausea was managed with multiple anti-emetics. The gastroenterology team was consulted and followed closely during admission. Due to SIRS with hypotension requiring pressor support, he was transferred to the PICU on 11/10. He initially required  norepinephrine on transfer, which was subsequently weaned with no further blood pressure concerns. Given his elevated risk for pancreatic pseudocyst formation and necrosis, repeat imaging with CT was obtained on 11/10 which demonstrated a 3cm focus of necrosis without loculated necrotic collection, internal hemorrhage, venous thrombosis, or pseudoaneurysm.  After his blood pressure and respiratory status stabilized, he was transferred from the PICU back to the floor. Lazaro's nausea and pain continued to improve and he was tolerating stable oral intake at time of discharge. Repeat imaging of his pancrease is planned for 11/24, 2 weeks after his last CT.    ID  Concern for infection in setting of pancreatic necrosis  Lazaro was treated with cefepime and metronidazole for 3 days while in the PICU. Blood and urine cultures were obtained and showed now growth. These antibiotics were discontinued after he was afebrile for 48 hours.     RESP  Pulmonary edema and pleural effusion  Lazaro developed pulmonary edema and pleural effusions due to capillary leak and fluid overload in setting of acute pancreatitis with SIRS. He required CPAP for several days while in the PICU and was subsequently weaned to room air. He remained stable on room air through the remainder of his admission.     HEME/ONC  T cell lymphoblastic lymphoma - VHR - treated per protocol [id number], D29 of consolidation [id number] Consolidation.  Lazaro was due for Cycle 6 D29 delayed of therapy on 11/7, which was delayed due to his admission or pancreatitis. Resumption of his chemotherapy will be determined by his outpatient hematology oncology team.     DVT of the right upper extremities  Lazaro was continued on his home lovenox 60mg every 12 hours during his admission. He required increased doses during his acute pancreatitis, up to 100 mg every 12 hours, due to subtherapeutic heparin 10a levels. At the time of discharge, his lovenox dose was 100mg BID. His  DVTs were monitored with ultrasound during his admission. Most recent US showed (stable thrombi on 11/13). He was noted to have low ATIII during his admission (thought to be secondary to peg-asparaginase), given stability of his clots on US and therapeutic Xa levels a repeat ATC was not checked during admission.     PCP ppx  Lazaro was continued on his home Bactrim every Monday and Tuesday during admission      FEN:  During admission due to poor oral intake and tenuous fluid status Lazaro ws started on TPN but he was able to quickly transition to full enteral [place] feeds. As he improved, he was allowed to eat orally ad chase and his oral intake improved over the course of hospitalization. He was discharged on a low fat diet.    Lazaro required significant diuresis with lasix during his PICU course due to fluid overload. Electrolytes were monitored closely and replaced as needed. He diuresed successfully, and was at dry weight of 73.8kg by the time of discharge.    During admission Won[place]ick constipation was managed with miralax and senna     NEURO:  Abdominal pain due to pancreatitis  Trevors pain was managed during admission with a morphine PCA, which was then transitioned to oral oxycodone on 11/15 along with scheduled tylenol. At time of discharge he is taking 10mg oxycodone scheduled every 4 hours for pain management. He will follow up with Dr. Campos and the PACCT team within 1 week of discharge. He will also follow up with the integrative medicine team outpatient.     Lazaro had significant nausea during admission with was managed with zofran, benadryl, compazine and ativan. Marinol was added on 11/15 to work as both an anti-emetic and an appetite stimulant. At time of discharge his nausea is being managed with Marinol, zofran, and bendaryl.    Consultations This Hospital Stay   PEDS GASTROENTEROLOGY IP CONSULT  PEDS NEPHROLOGY IP CONSULT  PEDS HEM/ONC IP CONSULT  PHARMACY/NUTRITION TO START AND MANAGE  TPN  PHYSICAL THERAPY PEDS IP CONSULT  INTERVENTIONAL RADIOLOGY ADULT/PEDS IP CONSULT  PEDS PACCT (PAIN AND ADVANCED/COMPLEX CARE TEAM) IP CONSULT  PEDS INTEGRATIVE HEALTH IP CONSULT    Code Status   Prior     The patient was discussed with Dr. Bethanie Godinez MD  PL3 - Pediatrics  [facility]  pager [phone]    I saw and evaluated the patient and agree with the resident's assessment and plan.  Bethanie Hussein MD, MPH, [facility]  Division of Pediatric Hematology/Oncology  ___________________________________________________________________    Physical Exam   Vital Signs: Temp: 98.2  F (36.8  C) Temp src: Oral BP: 126/84 Pulse: 124 Heart Rate: 124 Resp: 20 SpO2: 98 % O2 Device: None (Room air)    Weight: 162 lbs 11.19 oz     EXAM:  GENERAL: walking around the room, appears comfortable. Sits on bed without issue  SKIN: Clear. No significant rash, abnormal pigmentation or lesions  HEAD: Normocephalic  EYES: anicteric, EOMI  EARS: Normal external canals.  NOSE: Normal without discharge.  LUNGS: lungs clear without crackles. Normal WOB  HEART: Regular rhythm. Normal S1/S2. No murmurs. Normal pulses.  ABDOMEN: Soft, flat, non-distended. mild pain with palpation of the epigastric region.  EXT: moving all extremities well, no edema or deformities  NEUROLOGIC: no focal deficits, alert and interactive. Thought processes clear.         Primary Care Physician   Rodriguez Montoya    Discharge Orders      Home infusion referral      Activity    Your activity upon discharge: activity as tolerated     IV access    **Ordering Provider MUST call/page Care Coordinator/ to discuss arranging this service**    You are going home with the following vascular access device: Port-a-Cath.     When to contact your care team    If concerns or new fevers call [phone] and ask to speak with the Hematology Oncology Fellow     Discharge Instructions    1.  Continue to take a daily acid blocker - Pantoprazole to help with abdominal pain, this should be taken in the morning prior to breakfast    2. Continue to take oxycodone as needed for abdominal pain, while you are taking this medicine it is important to take your miralax as it can cause constipation    3. Nausea medications. Use zofran first for nausea, if this is not effective try benadryl next, if both of these are not effective try Ativan third    4. Your dose of lovenox has been increased, please take the new dose twice daily as prescribed.     Reason for your hospital stay    Lazaro was hospitalized for significant dehydration and vomiting and found to have pancreatitis related to his recent chemotherapy with Pegaspariginase     Follow Up and recommended labs and tests    Follow up on 11/21 in heme onc clinic  Follow up for pain control with the PACCT team at the end of the week (3-5 days after discharge)  Will need to follow up with Dr. Herb Coronel with adult gastroenterology in 4 weeks as outpatient (not yet scheduled)     Diet    Follow this diet upon discharge: Normal Diet       Significant Results and Procedures   Most Recent 3 CBC's:  Recent Labs   Lab Test 11/16/19  0529 11/14/19  0521 11/13/19  0453   WBC 13.2* 11.7* 9.6   HGB 7.8* 8.1* 7.8*   MCV 93 88 85    281 234     Most Recent 3 BMP's:  Recent Labs   Lab Test 11/17/19  0543 11/16/19  0529 11/15/19  0536    135 138   POTASSIUM 3.8 4.0 4.0   CHLORIDE 103 102 105   CO2 29 29 29   BUN 6* 6* 6*   CR 0.45* 0.48* 0.51*   ANIONGAP 4 4 4   MICHAEL 7.6* 7.6* 7.3*   GLC 97 88 84     Most Recent 2 LFT's:  Recent Labs   Lab Test 11/14/19  0521 11/12/19  0454   AST 27 27   ALT 43 42   ALKPHOS 444* 115   BILITOTAL 0.7 1.0     Most Recent 3 INR's:  Recent Labs   Lab Test 11/14/19  0521 11/12/19  0454 11/11/19  0411   INR 1.09 1.48* 1.57*       Discharge Medications   Current Discharge Medication List      START taking these medications    Details    acetaminophen (TYLENOL) 325 MG tablet Take 2 tablets (650 mg) by mouth every 6 hours as needed for mild pain  Qty: 60 tablet, Refills: 0    Associated Diagnoses: Drug-induced acute pancreatitis with uninfected necrosis      dronabinol (MARINOL) 2.5 MG capsule Take 1 capsule (2.5 mg) by mouth 2 times daily  Qty: 60 capsule, Refills: 0    Associated Diagnoses: 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)      ondansetron (ZOFRAN-ODT) 8 MG ODT tab Take 1 tablet (8 mg) by mouth every 6 hours as needed for nausea or vomiting  Qty: 20 tablet, Refills: 0    Associated Diagnoses: 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)      oxyCODONE (ROXICODONE) 10 MG tablet Take 1 tablet (10 mg) by mouth every 4 hours as needed for severe pain  Qty: 42 tablet, Refills: 0    Associated Diagnoses: Drug-induced acute pancreatitis with uninfected necrosis         CONTINUE these medications which have CHANGED    Details   enoxaparin ANTICOAGULANT (LOVENOX) 100 MG/ML syringe Inject 1 mL (100 mg) Subcutaneous every 12 hours  Qty: 180 mL, Refills: 0    Associated Diagnoses: Acute deep vein thrombosis (DVT) of other vein of both upper extremities (H)      pantoprazole (PROTONIX) 40 MG EC tablet Take 1 tablet (40 mg) by mouth every morning (before breakfast)  Qty: 30 tablet, Refills: 0    Associated Diagnoses: Drug-induced acute pancreatitis with uninfected necrosis      polyethylene glycol (MIRALAX/GLYCOLAX) packet Take 17 g by mouth daily  Qty: 30 packet, Refills: 0    Associated Diagnoses: 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)      sennosides (SENOKOT) 8.6 MG tablet Take 2 tablets by mouth 2 times daily as needed for constipation  Qty: 60 each, Refills: 1    Associated Diagnoses: 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)         CONTINUE these medications which have NOT CHANGED    Details   scopolamine (TRANSDERM) 1 MG/3DAYS 72 hr patch Place 1 patch onto the skin every 72 hours  Qty: 10 patch, Refills: 3     Associated Diagnoses: T lymphoblastic lymphoma (H)      sulfamethoxazole-trimethoprim (BACTRIM DS/SEPTRA DS) 800-160 MG tablet Take 1 tablet by mouth Every Mon, Tues two times daily  Qty: 16 tablet, Refills: 3      Vitamin D, Cholecalciferol, 1000 units TABS Take 2 tablets (2,000 Units) by mouth daily  Qty: 60 tablet, Refills: 3    Associated Diagnoses: Vitamin D deficiency      diphenhydrAMINE (BENADRYL) 25 MG capsule Take 1-2 capsules (25-50 mg) by mouth every 6 hours as needed (Breakthrough Nausea and Vomiting )  Qty: 30 capsule, Refills: 1    Associated Diagnoses: Lymphoma, unspecified body region, unspecified lymphoma type (H)      melatonin 3 MG tablet Take 1 tablet (3 mg) by mouth At Bedtime  Qty: 30 tablet, Refills: 1    Comments: Deliver to peds sedation  Associated Diagnoses: 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

## 2019-11-17 NOTE — PLAN OF CARE
Afebrile, VSS. Pain controlled on scheduled oxycodone. Taking fair PO. Pt discharged to home. Discharge instructions reviewed with him and he verbalized understanding. Discharge medications given to him. No other issues.

## 2019-11-17 NOTE — PLAN OF CARE
AVSS. Pain 4-6 over night with scheduled 10mg Oxy. Slept well over night.  No reports of N/V. Stooling and voiding adequately. Continuing to increase PO intake. Hourly safety rounding.

## 2019-11-17 NOTE — PLAN OF CARE
Physical Therapy Discharge Summary    Reason for therapy discharge:    Discharged to home.    Progress towards therapy goal(s). See goals on Care Plan in [facility] electronic health record for goal details.  Goals partially met.  Barriers to achieving goals:   discharge from facility.    Therapy recommendation(s):    Continued therapy is recommended.  Rationale/Recommendations:  Patient with HEP to continue at home, to reconsult IP PT if further needs arise when admitted in future.    Traci Mendez, PT, DPT

## 2019-11-18 ENCOUNTER — PATIENT OUTREACH (OUTPATIENT)
Dept: GASTROENTEROLOGY | Facility: CLINIC | Age: 19
End: 2019-11-18

## 2019-11-18 NOTE — PROGRESS NOTES
Received call from clinic requesting clinic w/ Dr. Coronel related to recent discharge.    Inquiring with Dr. Coronel regarding plan. Bibi at clinic would like call back at [phone] to coordinate appt around chemo if possible.      Meena Samaniego, RN Care Coordinator

## 2019-11-19 ENCOUNTER — HOME INFUSION (PRE-WILLOW HOME INFUSION) (OUTPATIENT)
Dept: PHARMACY | Facility: CLINIC | Age: 19
End: 2019-11-19

## 2019-11-19 ENCOUNTER — OFFICE VISIT (OUTPATIENT)
Dept: PEDIATRIC HEMATOLOGY/ONCOLOGY | Facility: CLINIC | Age: 19
End: 2019-11-19
Attending: NURSE PRACTITIONER
Payer: MEDICAID

## 2019-11-19 VITALS
TEMPERATURE: 98.3 F | WEIGHT: 162.04 LBS | HEIGHT: 73 IN | RESPIRATION RATE: 18 BRPM | OXYGEN SATURATION: 100 % | DIASTOLIC BLOOD PRESSURE: 67 MMHG | HEART RATE: 122 BPM | SYSTOLIC BLOOD PRESSURE: 115 MMHG | BODY MASS INDEX: 21.48 KG/M2

## 2019-11-19 DIAGNOSIS — C83.50 T LYMPHOBLASTIC LYMPHOMA (H): ICD-10-CM

## 2019-11-19 LAB
ALBUMIN SERPL-MCNC: 2.5 G/DL (ref 3.4–5)
ALP SERPL-CCNC: 446 U/L (ref 40–150)
ALT SERPL W P-5'-P-CCNC: 31 U/L (ref 0–70)
ANION GAP SERPL CALCULATED.3IONS-SCNC: 4 MMOL/L (ref 3–14)
ANISOCYTOSIS BLD QL SMEAR: ABNORMAL
AST SERPL W P-5'-P-CCNC: 17 U/L (ref 0–45)
BASOPHILS # BLD AUTO: 0 10E9/L (ref 0–0.2)
BASOPHILS NFR BLD AUTO: 0 %
BILIRUB SERPL-MCNC: 0.4 MG/DL (ref 0.2–1.3)
BUN SERPL-MCNC: 5 MG/DL (ref 7–30)
CALCIUM SERPL-MCNC: 8 MG/DL (ref 8.5–10.1)
CHLORIDE SERPL-SCNC: 106 MMOL/L (ref 94–109)
CO2 SERPL-SCNC: 28 MMOL/L (ref 20–32)
CREAT SERPL-MCNC: 0.51 MG/DL (ref 0.66–1.25)
DIFFERENTIAL METHOD BLD: ABNORMAL
EOSINOPHIL # BLD AUTO: 0 10E9/L (ref 0–0.7)
EOSINOPHIL NFR BLD AUTO: 0 %
ERYTHROCYTE [DISTWIDTH] IN BLOOD BY AUTOMATED COUNT: 22.7 % (ref 10–15)
GFR SERPL CREATININE-BSD FRML MDRD: >90 ML/MIN/{1.73_M2}
GLUCOSE SERPL-MCNC: 135 MG/DL (ref 70–99)
HCT VFR BLD AUTO: 28.7 % (ref 40–53)
HGB BLD-MCNC: 8.8 G/DL (ref 13.3–17.7)
LMWH PPP CHRO-ACNC: 0.7 IU/ML
LYMPHOCYTES # BLD AUTO: 0.7 10E9/L (ref 0.8–5.3)
LYMPHOCYTES NFR BLD AUTO: 3.5 %
MCH RBC QN AUTO: 29 PG (ref 26.5–33)
MCHC RBC AUTO-ENTMCNC: 30.7 G/DL (ref 31.5–36.5)
MCV RBC AUTO: 95 FL (ref 78–100)
METAMYELOCYTES # BLD: 0.5 10E9/L
METAMYELOCYTES NFR BLD MANUAL: 2.6 %
MICROCYTES BLD QL SMEAR: PRESENT
MONOCYTES # BLD AUTO: 0.9 10E9/L (ref 0–1.3)
MONOCYTES NFR BLD AUTO: 4.4 %
MYELOCYTES # BLD: 0.7 10E9/L
MYELOCYTES NFR BLD MANUAL: 3.5 %
NEUTROPHILS # BLD AUTO: 17.4 10E9/L (ref 1.6–8.3)
NEUTROPHILS NFR BLD AUTO: 85.1 %
NRBC # BLD AUTO: 0.2 10*3/UL
NRBC BLD AUTO-RTO: 1 /100
PLATELET # BLD AUTO: 568 10E9/L (ref 150–450)
PLATELET # BLD EST: ABNORMAL 10*3/UL
POIKILOCYTOSIS BLD QL SMEAR: SLIGHT
POTASSIUM SERPL-SCNC: 4 MMOL/L (ref 3.4–5.3)
PROMYELOCYTES # BLD MANUAL: 0.2 10E9/L
PROMYELOCYTES NFR BLD MANUAL: 0.9 %
PROT SERPL-MCNC: 6.4 G/DL (ref 6.8–8.8)
RBC # BLD AUTO: 3.03 10E12/L (ref 4.4–5.9)
SODIUM SERPL-SCNC: 138 MMOL/L (ref 133–144)
WBC # BLD AUTO: 20.4 10E9/L (ref 4–11)

## 2019-11-19 PROCEDURE — 85520 HEPARIN ASSAY: CPT | Performed by: NURSE PRACTITIONER

## 2019-11-19 PROCEDURE — 80053 COMPREHEN METABOLIC PANEL: CPT | Performed by: NURSE PRACTITIONER

## 2019-11-19 PROCEDURE — 36415 COLL VENOUS BLD VENIPUNCTURE: CPT | Performed by: NURSE PRACTITIONER

## 2019-11-19 PROCEDURE — 85025 COMPLETE CBC W/AUTO DIFF WBC: CPT | Performed by: NURSE PRACTITIONER

## 2019-11-19 PROCEDURE — G0463 HOSPITAL OUTPT CLINIC VISIT: HCPCS | Mod: ZF

## 2019-11-19 ASSESSMENT — PAIN SCALES - GENERAL: PAINLEVEL: NO PAIN (0)

## 2019-11-19 ASSESSMENT — MIFFLIN-ST. JEOR: SCORE: 1786.87

## 2019-11-19 NOTE — PROGRESS NOTES
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 Lazaro presented with acute onset cough and was found to have an anterior mediastinal mass and malignant left-sided pleural effusion.  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  He was admitted to [facility] oncology service 8/30 and started on treatment per [id number].  He had a pleural effusion that required a chest tube and a pericardial effusion that was not drained. His Induction was complicated by cardiac compression secondary to his mass leading to tamponade, this improved through out his hospital stay.  He also had dysphagia that improved with treatment of his mass, swallow study was normal. His course was recently complicated by extensive bilateral UE DVTs, he remains on anticoagulation.  Most recently his course was complicated by the development of severe asparaginase induced pancreatitis. He became quite ill with SIRS and associated hypotension, he required pressor support in the ICU.  Lazaro also had severe pain that was initially managed with a PCA and transitioned to oral prior to discharge.  He was recently discharge from the hospital and comes to clinic today for follow up. His Day 29 of Consolidation has been delayed secondary to pancreatitis.    HPI:   Lazaro comes to clinic today alone.  He states he's done well since hospital discharge.  His first night home he woke up with pain in the night but since they he's been sleeping through the night.  He is taking oxycodone 10mg about every 4 hours.  He does not get up at night to take it, estimates he takes 3-4 doses per day.  His pain is well controlled on this regimen.  He has not been taking tylenol.  Lazaro has not had fever.    Lazaro has been eating well.  He didn't get marinol at the time of discharge so has not been taking it.  He ate mac and cheese and pizza  yesterday and tolerated this well.  He did not have any more pain after eating.  He has a constant mild baseline pain that is tolerable to him. Lazaro is taking miralax regularly and passing 1-2 soft stools daily.  He took one dose of zofran today and being in a car sometimes causes him nausea.  He otherwise has not had any nausea.   He is ambulating without difficulty.  No skin concerns.  Denies any motor or sensory changes in his hands/feet.      Review of systems:  Remainder of ROS is complete and negative.    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  He was working full time at [employer] in [place] prior to his diagnosis. He is planning to visit a friend's new baby today.     Current Medications:  Current Outpatient Medications on File Prior to Visit   Medication Sig Dispense Refill     acetaminophen (TYLENOL) 325 MG tablet Take 2 tablets (650 mg) by mouth every 6 hours as needed for mild pain 60 tablet 0     diphenhydrAMINE (BENADRYL) 25 MG capsule Take 1-2 capsules (25-50 mg) by mouth every 6 hours as needed (Breakthrough Nausea and Vomiting ) 30 capsule 1     dronabinol (MARINOL) 2.5 MG capsule Take 1 capsule (2.5 mg) by mouth 2 times daily 60 capsule 0     enoxaparin ANTICOAGULANT (LOVENOX) 100 MG/ML  "syringe Inject 1 mL (100 mg) Subcutaneous every 12 hours 180 mL 0     melatonin 3 MG tablet Take 1 tablet (3 mg) by mouth At Bedtime 30 tablet 1     ondansetron (ZOFRAN-ODT) 8 MG ODT tab Take 1 tablet (8 mg) by mouth every 6 hours as needed for nausea or vomiting 20 tablet 0     oxyCODONE (ROXICODONE) 10 MG tablet Take 1 tablet (10 mg) by mouth every 4 hours as needed for severe pain 42 tablet 0     pantoprazole (PROTONIX) 40 MG EC tablet Take 1 tablet (40 mg) by mouth every morning (before breakfast) 30 tablet 0     polyethylene glycol (MIRALAX/GLYCOLAX) packet Take 17 g by mouth daily 30 packet 0     scopolamine (TRANSDERM) 1 MG/3DAYS 72 hr patch Place 1 patch onto the skin every 72 hours 10 patch 3     sennosides (SENOKOT) 8.6 MG tablet Take 2 tablets by mouth 2 times daily as needed for constipation 60 each 1     sulfamethoxazole-trimethoprim (BACTRIM DS/SEPTRA DS) 800-160 MG tablet Take 1 tablet by mouth Every Mon, Tues two times daily 16 tablet 3     Vitamin D, Cholecalciferol, 1000 units TABS Take 2 tablets (2,000 Units) by mouth daily 60 tablet 3   Received influenza vaccine for the 5087-2055 season.  Not taking marinol (was never dispensed) or senna.    Physical Exam:   Temp:  [98.3  F (36.8  C)] 98.3  F (36.8  C)  Pulse:  [122] 122  Resp:  [18] 18  BP: (115)/(67) 115/67  SpO2:  [100 %] 100 %  Wt Readings from Last 4 Encounters:   11/19/19 73.5 kg (162 lb 0.6 oz)   11/16/19 73.8 kg (162 lb 11.2 oz)   11/07/19 72.4 kg (159 lb 9.8 oz)   10/31/19 73.1 kg (161 lb 2.5 oz)     Ht Readings from Last 2 Encounters:   11/19/19 1.843 m (6' 0.56\")   11/07/19 1.82 m (5' 11.65\")     Weight at diagnosis was 75.2kg, Lazaro considers his baseline weight to be 173-175lbs before he initially got sick  General: Lazaro is alert, interactive and appropriate. He appears well, is upbeat and in no obvious pain.  HEENT: Skull is atrauamatic and normocephalic.  The fullness in the right side of his face has improved, no venous " distention.  Full head of hair. PERRLA, sclera are non icteric and not injected, EOM are intact, gaze slightly disconjugate which is his baseline. Nares are patent without drainage. Oropharynx clear, no plaques noted. Buccal mucosa and tongue clear. MMM. Tympanic membranes are opaque bilaterally with light reflex and landmarks present.  Voice sounds hoarse but improved from previous.  Lymph:  Neck is supple without lymphadenopathy.  There is no supraclavicular, axillary or inguinal lymphadenopathy palpated.  Cardiovascular:  HR is tachycardic, S1, S2 no murmur.  Capillary refill is < 2 seconds. Right arm without edema or erythema.  No pitting edema.  Cap refill < 2 sec. Peripheral pulses 2+/=. He has mildly distended veins noted on the left upper chest, not extending up to the neck, overlying the pectoralis.   Respiratory: No cough noted. Respirations are easy.  Lungs are clear to auscultation through out.  No crackles or wheezes.  Gastrointestinal:  BS present in all quadrants.  Abdomen is soft and flat.  Lazaro reports mild LUQ discomfort but this does not increase with palpation. No hepatosplenomegaly or masses are palpated.  Skin: Truncal and upper extremity papular rash has improved.  No vesicular lesions.   Neurological:  CN 2-12 grossly intact. Gait is normal.  No issues with balance. Sensation intact in hands and feet.     Musculoskeletal:  Good strength and ROM in all extremities.  Strong dorsiflexion at ankles and great toes (5/5) bilaterally without any pain at the Achilles.    Labs:   Results for orders placed or performed in visit on 11/19/19   CBC with platelets differential     Status: Abnormal   Result Value Ref Range    WBC 20.4 (H) 4.0 - 11.0 10e9/L    RBC Count 3.03 (L) 4.4 - 5.9 10e12/L    Hemoglobin 8.8 (L) 13.3 - 17.7 g/dL    Hematocrit 28.7 (L) 40.0 - 53.0 %    MCV 95 78 - 100 fl    MCH 29.0 26.5 - 33.0 pg    MCHC 30.7 (L) 31.5 - 36.5 g/dL    RDW 22.7 (H) 10.0 - 15.0 %    Platelet Count 568  (H) 150 - 450 10e9/L    Diff Method Manual Differential     % Neutrophils 85.1 %    % Lymphocytes 3.5 %    % Monocytes 4.4 %    % Eosinophils 0.0 %    % Basophils 0.0 %    % Metamyelocytes 2.6 %    % Myelocytes 3.5 %    % Promyelocytes 0.9 %    Nucleated RBCs 1 (H) 0 /100    Absolute Neutrophil 17.4 (H) 1.6 - 8.3 10e9/L    Absolute Lymphocytes 0.7 (L) 0.8 - 5.3 10e9/L    Absolute Monocytes 0.9 0.0 - 1.3 10e9/L    Absolute Eosinophils 0.0 0.0 - 0.7 10e9/L    Absolute Basophils 0.0 0.0 - 0.2 10e9/L    Absolute Metamyelocytes 0.5 (H) 0 10e9/L    Absolute Myelocytes 0.7 (H) 0 10e9/L    Absolute Promyeloctyes 0.2 (H) 0 10e9/L    Absolute Nucleated RBC 0.2     Anisocytosis Moderate     Poikilocytosis Slight     Microcytes Present     Platelet Estimate Confirming automated cell count    Heparin 10a Level     Status: None   Result Value Ref Range    Heparin 10A Level 0.70 IU/mL   Comprehensive metabolic panel     Status: Abnormal   Result Value Ref Range    Sodium 138 133 - 144 mmol/L    Potassium 4.0 3.4 - 5.3 mmol/L    Chloride 106 94 - 109 mmol/L    Carbon Dioxide 28 20 - 32 mmol/L    Anion Gap 4 3 - 14 mmol/L    Glucose 135 (H) 70 - 99 mg/dL    Urea Nitrogen 5 (L) 7 - 30 mg/dL    Creatinine 0.51 (L) 0.66 - 1.25 mg/dL    GFR Estimate >90 >60 mL/min/[1.73_m2]    GFR Estimate If Black >90 >60 mL/min/[1.73_m2]    Calcium 8.0 (L) 8.5 - 10.1 mg/dL    Bilirubin Total 0.4 0.2 - 1.3 mg/dL    Albumin 2.5 (L) 3.4 - 5.0 g/dL    Protein Total 6.4 (L) 6.8 - 8.8 g/dL    Alkaline Phosphatase 446 (H) 40 - 150 U/L    ALT 31 0 - 70 U/L    AST 17 0 - 45 U/L       Assessment:  Lazaro Lund is a 20 year old young man with T cell lymphoblastic lymphoma (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  He is being treated per [facility] protocol [id number].   He's had a CRu, resolution of lymphadenopathy however a small pleural effusion persists. His course has been complicated by extensive bilateral DVT and most recently severe pancreatitis.  He is tolerating an oral  diet and his pancreatitis related pain is well controlled on oral regimen.  His WBC/ANC are elevated today however pain is improving and he does not have fever.  He developed an area of pancreatic necrosis and will be following up with adult GI.Asparaginase will be permanently discontinued from his treatment regimen.  He is on lovenox for anticoagulation. 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 Recent Anti-Xa was therapeutic but he required an increased dose of lovenox while inpatient, will recheck today. He is on Vit D for deficiency.  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  His Day 29 of Consolidation has been delayed.      Plan:   1) Discussed elevated WBC with Dr Aguilar (GI) to see if she felt this warranted earlier imaging.  Given Lazaro is doing well clinically will hold off (unless he develops fever or increased pain) and plan to repeat imaging next week with a CT as previously planned.  2) Reviewed at length with Lazaro the s/sx to monitor for and reasons to call.  He is tolerating a fairly regular diet at this point, would still recommend he try to avoid high fat foods.  3) Continue lovenox at current dose, level today in goal range of 0.6-1. He is aware of the need to hold PM prior and AM of his LP. Will maintain platelet count > 30K while on anticoagulation.  Recent U/S stable, minor venous distention not surprising given the extent of his known thrombus, no other symptoms to suggest new acute thrombus. Plan to repeat U/S in 1-2 months.  4) Continue Vit D 3 2000IU daily, recheck level in 2 months.  5) Day 29 Induction LP deferred, original plan was to make up on Day 29 of Consolidation however given his recent complications I will defer this to Maintenance therapy.  6) Given recent pancreatitis and intermediate TPMT phenotype I would like to dose reduce his 6MP for the 2nd half of Consolidation by 50%.  Will obtain TPMT genotype once that testing becomes  available again.   7) Given significant spinal headache history will plan for IV caffeine following LPs in the future.   8) Plan to repeat CT at the end of Consolidation to evaluate small residual pleural effusion.   9) Will need to follow up with Dr. Herb Coronel with adult gastroenterology in 4 weeks as outpatient. He will also need to follow with GI for chronic monitoring for endocrine/exocrine insufficiency.   10) Follow up with PACCT team on Thursday for ongoing pain management  11) Continue protonix daily.  12) RTC on Thursday for Day 29 of Consolidation.

## 2019-11-19 NOTE — NURSING NOTE
"Chief Complaint   Patient presents with     RECHECK     Patient is here today for Lymphoma follow up     /67 (BP Location: Right arm, Patient Position: Fowlers, Cuff Size: Adult Regular)   Pulse 122   Temp 98.3  F (36.8  C) (Oral)   Resp 18   Ht 1.843 m (6' 0.56\")   Wt 73.5 kg (162 lb 0.6 oz)   SpO2 100%   BMI 21.64 kg/m      Josie Hernández LPN  November 19, 2019    "

## 2019-11-19 NOTE — LETTER
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 Lazaro presented with acute onset cough and was found to have an anterior mediastinal mass and malignant left-sided pleural effusion.  A CT guided biopsy was obtained at [facility] and pathology was consistent with T-cell leukemia vs lymphoma.  He was admitted to [facility] oncology service 8/30 and started on treatment per [id number].  He had a pleural effusion that required a chest tube and a pericardial effusion that was not drained. His Induction was complicated by cardiac compression secondary to his mass leading to tamponade, this improved through out his hospital stay.  He also had dysphagia that improved with treatment of his mass, swallow study was normal. His course was recently complicated by extensive bilateral UE DVTs, he remains on anticoagulation.  Most recently his course was complicated by the development of severe asparaginase induced pancreatitis. He became quite ill with SIRS and associated hypotension, he required pressor support in the ICU.  Lazaro also had severe pain that was initially managed with a PCA and transitioned to oral prior to discharge.  He was recently discharge from the hospital and comes to clinic today for follow up. His Day 29 of Consolidation has been delayed secondary to pancreatitis.    HPI:   Lazaro comes to clinic today alone.  He states he's done well since hospital discharge.  His first night home he woke up with pain in the night but since they he's been sleeping through the night.  He is taking oxycodone 10mg about every 4 hours.  He does not get up at night to take it, estimates he takes 3-4 doses per day.  His pain is well controlled on this regimen.  He has not been taking tylenol.  Lazaro has not had fever.    Lazaro has been eating well.  He didn't get marinol at  the time of discharge so has not been taking it.  He ate mac and cheese and pizza yesterday and tolerated this well.  He did not have any more pain after eating.  He has a constant mild baseline pain that is tolerable to him. Lazaro is taking miralax regularly and passing 1-2 soft stools daily.  He took one dose of zofran today and being in a car sometimes causes him nausea.  He otherwise has not had any nausea.   He is ambulating without difficulty.  No skin concerns.  Denies any motor or sensory changes in his hands/feet.      Review of systems:  Remainder of ROS is complete and negative.    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  He was working full time at [employer] in [place] prior to his diagnosis. He is planning to visit a friend's new baby today.     Current Medications:  Current Outpatient Medications on File Prior to Visit   Medication Sig Dispense Refill     acetaminophen (TYLENOL) 325 MG tablet Take 2 tablets (650 mg) by mouth every 6 hours as needed for mild pain 60 tablet 0     diphenhydrAMINE (BENADRYL) 25 MG capsule Take 1-2 capsules (25-50 mg) by mouth every 6 hours as needed (Breakthrough Nausea and Vomiting ) 30 capsule 1     dronabinol (MARINOL) 2.5 MG capsule Take 1 capsule (2.5 mg) by  "mouth 2 times daily 60 capsule 0     enoxaparin ANTICOAGULANT (LOVENOX) 100 MG/ML syringe Inject 1 mL (100 mg) Subcutaneous every 12 hours 180 mL 0     melatonin 3 MG tablet Take 1 tablet (3 mg) by mouth At Bedtime 30 tablet 1     ondansetron (ZOFRAN-ODT) 8 MG ODT tab Take 1 tablet (8 mg) by mouth every 6 hours as needed for nausea or vomiting 20 tablet 0     oxyCODONE (ROXICODONE) 10 MG tablet Take 1 tablet (10 mg) by mouth every 4 hours as needed for severe pain 42 tablet 0     pantoprazole (PROTONIX) 40 MG EC tablet Take 1 tablet (40 mg) by mouth every morning (before breakfast) 30 tablet 0     polyethylene glycol (MIRALAX/GLYCOLAX) packet Take 17 g by mouth daily 30 packet 0     scopolamine (TRANSDERM) 1 MG/3DAYS 72 hr patch Place 1 patch onto the skin every 72 hours 10 patch 3     sennosides (SENOKOT) 8.6 MG tablet Take 2 tablets by mouth 2 times daily as needed for constipation 60 each 1     sulfamethoxazole-trimethoprim (BACTRIM DS/SEPTRA DS) 800-160 MG tablet Take 1 tablet by mouth Every Mon, Tues two times daily 16 tablet 3     Vitamin D, Cholecalciferol, 1000 units TABS Take 2 tablets (2,000 Units) by mouth daily 60 tablet 3   Received influenza vaccine for the 3828-9662 season.  Not taking marinol (was never dispensed) or senna.    Physical Exam:   Temp:  [98.3  F (36.8  C)] 98.3  F (36.8  C)  Pulse:  [122] 122  Resp:  [18] 18  BP: (115)/(67) 115/67  SpO2:  [100 %] 100 %  Wt Readings from Last 4 Encounters:   11/19/19 73.5 kg (162 lb 0.6 oz)   11/16/19 73.8 kg (162 lb 11.2 oz)   11/07/19 72.4 kg (159 lb 9.8 oz)   10/31/19 73.1 kg (161 lb 2.5 oz)     Ht Readings from Last 2 Encounters:   11/19/19 1.843 m (6' 0.56\")   11/07/19 1.82 m (5' 11.65\")     Weight at diagnosis was 75.2kg, Lazaro considers his baseline weight to be 173-175lbs before he initially got sick  General: Lazaro is alert, interactive and appropriate. He appears well, is upbeat and in no obvious pain.  HEENT: Skull is atrauamatic and " normocephalic.  The fullness in the right side of his face has improved, no venous distention.  Full head of hair. PERRLA, sclera are non icteric and not injected, EOM are intact, gaze slightly disconjugate which is his baseline. Nares are patent without drainage. Oropharynx clear, no plaques noted. Buccal mucosa and tongue clear. MMM. Tympanic membranes are opaque bilaterally with light reflex and landmarks present.  Voice sounds hoarse but improved from previous.  Lymph:  Neck is supple without lymphadenopathy.  There is no supraclavicular, axillary or inguinal lymphadenopathy palpated.  Cardiovascular:  HR is tachycardic, S1, S2 no murmur.  Capillary refill is < 2 seconds. Right arm without edema or erythema.  No pitting edema.  Cap refill < 2 sec. Peripheral pulses 2+/=. He has mildly distended veins noted on the left upper chest, not extending up to the neck, overlying the pectoralis.   Respiratory: No cough noted. Respirations are easy.  Lungs are clear to auscultation through out.  No crackles or wheezes.  Gastrointestinal:  BS present in all quadrants.  Abdomen is soft and flat.  Lazaro reports mild LUQ discomfort but this does not increase with palpation. No hepatosplenomegaly or masses are palpated.  Skin: Truncal and upper extremity papular rash has improved.  No vesicular lesions.   Neurological:  CN 2-12 grossly intact. Gait is normal.  No issues with balance. Sensation intact in hands and feet.     Musculoskeletal:  Good strength and ROM in all extremities.  Strong dorsiflexion at ankles and great toes (5/5) bilaterally without any pain at the Achilles.    Labs:   Results for orders placed or performed in visit on 11/19/19   CBC with platelets differential     Status: Abnormal   Result Value Ref Range    WBC 20.4 (H) 4.0 - 11.0 10e9/L    RBC Count 3.03 (L) 4.4 - 5.9 10e12/L    Hemoglobin 8.8 (L) 13.3 - 17.7 g/dL    Hematocrit 28.7 (L) 40.0 - 53.0 %    MCV 95 78 - 100 fl    MCH 29.0 26.5 - 33.0 pg     MCHC 30.7 (L) 31.5 - 36.5 g/dL    RDW 22.7 (H) 10.0 - 15.0 %    Platelet Count 568 (H) 150 - 450 10e9/L    Diff Method Manual Differential     % Neutrophils 85.1 %    % Lymphocytes 3.5 %    % Monocytes 4.4 %    % Eosinophils 0.0 %    % Basophils 0.0 %    % Metamyelocytes 2.6 %    % Myelocytes 3.5 %    % Promyelocytes 0.9 %    Nucleated RBCs 1 (H) 0 /100    Absolute Neutrophil 17.4 (H) 1.6 - 8.3 10e9/L    Absolute Lymphocytes 0.7 (L) 0.8 - 5.3 10e9/L    Absolute Monocytes 0.9 0.0 - 1.3 10e9/L    Absolute Eosinophils 0.0 0.0 - 0.7 10e9/L    Absolute Basophils 0.0 0.0 - 0.2 10e9/L    Absolute Metamyelocytes 0.5 (H) 0 10e9/L    Absolute Myelocytes 0.7 (H) 0 10e9/L    Absolute Promyeloctyes 0.2 (H) 0 10e9/L    Absolute Nucleated RBC 0.2     Anisocytosis Moderate     Poikilocytosis Slight     Microcytes Present     Platelet Estimate Confirming automated cell count    Heparin 10a Level     Status: None   Result Value Ref Range    Heparin 10A Level 0.70 IU/mL   Comprehensive metabolic panel     Status: Abnormal   Result Value Ref Range    Sodium 138 133 - 144 mmol/L    Potassium 4.0 3.4 - 5.3 mmol/L    Chloride 106 94 - 109 mmol/L    Carbon Dioxide 28 20 - 32 mmol/L    Anion Gap 4 3 - 14 mmol/L    Glucose 135 (H) 70 - 99 mg/dL    Urea Nitrogen 5 (L) 7 - 30 mg/dL    Creatinine 0.51 (L) 0.66 - 1.25 mg/dL    GFR Estimate >90 >60 mL/min/[1.73_m2]    GFR Estimate If Black >90 >60 mL/min/[1.73_m2]    Calcium 8.0 (L) 8.5 - 10.1 mg/dL    Bilirubin Total 0.4 0.2 - 1.3 mg/dL    Albumin 2.5 (L) 3.4 - 5.0 g/dL    Protein Total 6.4 (L) 6.8 - 8.8 g/dL    Alkaline Phosphatase 446 (H) 40 - 150 U/L    ALT 31 0 - 70 U/L    AST 17 0 - 45 U/L       Assessment:  Lazaro Lund is a 20 year old young man with T cell lymphoblastic lymphoma (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  He is being treated per [facility] protocol [id number].   He's had a CRu, resolution of lymphadenopathy however a small pleural effusion persists. His course has been complicated by extensive  bilateral DVT and most recently severe pancreatitis.  He is tolerating an oral diet and his pancreatitis related pain is well controlled on oral regimen.  His WBC/ANC are elevated today however pain is improving and he does not have fever.  He developed an area of pancreatic necrosis and will be following up with adult GI.Asparaginase will be permanently discontinued from his treatment regimen.  He is on lovenox for anticoagulation. 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 Recent Anti-Xa was therapeutic but he required an increased dose of lovenox while inpatient, will recheck today. He is on Vit D for deficiency.  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  His Day 29 of Consolidation has been delayed.      Plan:   1) Discussed elevated WBC with Dr Aguilar (GI) to see if she felt this warranted earlier imaging.  Given Lazaro is doing well clinically will hold off (unless he develops fever or increased pain) and plan to repeat imaging next week with a CT as previously planned.  2) Reviewed at length with Lazaro the s/sx to monitor for and reasons to call.  He is tolerating a fairly regular diet at this point, would still recommend he try to avoid high fat foods.  3) Continue lovenox at current dose, level today in goal range of 0.6-1. He is aware of the need to hold PM prior and AM of his LP. Will maintain platelet count > 30K while on anticoagulation.  Recent U/S stable, minor venous distention not surprising given the extent of his known thrombus, no other symptoms to suggest new acute thrombus. Plan to repeat U/S in 1-2 months.  4) Continue Vit D 3 2000IU daily, recheck level in 2 months.  5) Day 29 Induction LP deferred, original plan was to make up on Day 29 of Consolidation however given his recent complications I will defer this to Maintenance therapy.  6) Given recent pancreatitis and intermediate TPMT phenotype I would like to dose reduce his 6MP for the 2nd half of  Consolidation by 50%.  Will obtain TPMT genotype once that testing becomes available again.   7) Given significant spinal headache history will plan for IV caffeine following LPs in the future.   8) Plan to repeat CT at the end of Consolidation to evaluate small residual pleural effusion.   9) Will need to follow up with Dr. Herb Coronel with adult gastroenterology in 4 weeks as outpatient. He will also need to follow with GI for chronic monitoring for endocrine/exocrine insufficiency.   10) Follow up with PACCT team on Thursday for ongoing pain management  11) Continue protonix daily.  12) RTC on Thursday for Day 29 of Consolidation.       YOHAN Dunn CNP

## 2019-11-20 ENCOUNTER — PATIENT OUTREACH (OUTPATIENT)
Dept: GASTROENTEROLOGY | Facility: CLINIC | Age: 19
End: 2019-11-20

## 2019-11-20 DIAGNOSIS — K85.31 DRUG-INDUCED ACUTE PANCREATITIS WITH UNINFECTED NECROSIS: Primary | ICD-10-CM

## 2019-11-20 NOTE — PROGRESS NOTES
Recent discharge documented note follow up in clinic with Dr. Coronel    Clinic December - January. Would also want an MRI/MRCP before the visit     Talked to [facility] clinics who help manage patients chemo. Advised I would work with patient directly regarding scheduling. They advised CT was ordered in 2 weeks.    Orders placed for MRI/MRCP. Next clinic open 1/9/20.     Left message on voice mail.      Meena Samaniego RN Care Coordinator

## 2019-11-21 ENCOUNTER — OFFICE VISIT (OUTPATIENT)
Dept: PEDIATRICS | Facility: CLINIC | Age: 19
End: 2019-11-21
Attending: PEDIATRICS
Payer: MEDICAID

## 2019-11-21 ENCOUNTER — HOME INFUSION (PRE-WILLOW HOME INFUSION) (OUTPATIENT)
Dept: PHARMACY | Facility: CLINIC | Age: 19
End: 2019-11-21

## 2019-11-21 ENCOUNTER — INFUSION THERAPY VISIT (OUTPATIENT)
Dept: INFUSION THERAPY | Facility: CLINIC | Age: 19
End: 2019-11-21
Attending: NURSE PRACTITIONER
Payer: MEDICAID

## 2019-11-21 ENCOUNTER — OFFICE VISIT (OUTPATIENT)
Dept: PEDIATRIC HEMATOLOGY/ONCOLOGY | Facility: CLINIC | Age: 19
End: 2019-11-21
Attending: NURSE PRACTITIONER
Payer: MEDICAID

## 2019-11-21 VITALS
DIASTOLIC BLOOD PRESSURE: 74 MMHG | BODY MASS INDEX: 21.83 KG/M2 | SYSTOLIC BLOOD PRESSURE: 131 MMHG | TEMPERATURE: 98.5 F | WEIGHT: 161.16 LBS | OXYGEN SATURATION: 100 % | RESPIRATION RATE: 18 BRPM | HEIGHT: 72 IN | HEART RATE: 102 BPM

## 2019-11-21 DIAGNOSIS — M79.2 NEUROPATHIC PAIN: Primary | ICD-10-CM

## 2019-11-21 DIAGNOSIS — K85.31 DRUG-INDUCED ACUTE PANCREATITIS WITH UNINFECTED NECROSIS: ICD-10-CM

## 2019-11-21 DIAGNOSIS — C83.50 T LYMPHOBLASTIC LYMPHOMA (H): Primary | ICD-10-CM

## 2019-11-21 DIAGNOSIS — G47.01 INSOMNIA DUE TO MEDICAL CONDITION: ICD-10-CM

## 2019-11-21 LAB
ABO + RH BLD: NORMAL
ABO + RH BLD: NORMAL
ALBUMIN SERPL-MCNC: 2.5 G/DL (ref 3.4–5)
ALP SERPL-CCNC: 292 U/L (ref 40–150)
ALT SERPL W P-5'-P-CCNC: 31 U/L (ref 0–70)
ANION GAP SERPL CALCULATED.3IONS-SCNC: 4 MMOL/L (ref 3–14)
ANISOCYTOSIS BLD QL SMEAR: ABNORMAL
AST SERPL W P-5'-P-CCNC: 17 U/L (ref 0–45)
BASOPHILS # BLD AUTO: 0 10E9/L (ref 0–0.2)
BASOPHILS NFR BLD AUTO: 0 %
BILIRUB SERPL-MCNC: 0.3 MG/DL (ref 0.2–1.3)
BLD GP AB SCN SERPL QL: NORMAL
BLD PROD TYP BPU: NORMAL
BLOOD BANK CMNT PATIENT-IMP: NORMAL
BUN SERPL-MCNC: 7 MG/DL (ref 7–30)
CALCIUM SERPL-MCNC: 8 MG/DL (ref 8.5–10.1)
CHLORIDE SERPL-SCNC: 107 MMOL/L (ref 94–109)
CO2 SERPL-SCNC: 28 MMOL/L (ref 20–32)
CREAT SERPL-MCNC: 0.39 MG/DL (ref 0.66–1.25)
DIFFERENTIAL METHOD BLD: ABNORMAL
EOSINOPHIL # BLD AUTO: 0 10E9/L (ref 0–0.7)
EOSINOPHIL NFR BLD AUTO: 0 %
ERYTHROCYTE [DISTWIDTH] IN BLOOD BY AUTOMATED COUNT: 22.5 % (ref 10–15)
GFR SERPL CREATININE-BSD FRML MDRD: >90 ML/MIN/{1.73_M2}
GLUCOSE SERPL-MCNC: 129 MG/DL (ref 70–99)
HCT VFR BLD AUTO: 26.7 % (ref 40–53)
HGB BLD-MCNC: 8.2 G/DL (ref 13.3–17.7)
LYMPHOCYTES # BLD AUTO: 0.6 10E9/L (ref 0.8–5.3)
LYMPHOCYTES NFR BLD AUTO: 3.6 %
MACROCYTES BLD QL SMEAR: PRESENT
MCH RBC QN AUTO: 29 PG (ref 26.5–33)
MCHC RBC AUTO-ENTMCNC: 30.7 G/DL (ref 31.5–36.5)
MCV RBC AUTO: 94 FL (ref 78–100)
METAMYELOCYTES # BLD: 0.1 10E9/L
METAMYELOCYTES NFR BLD MANUAL: 0.9 %
MICROCYTES BLD QL SMEAR: PRESENT
MONOCYTES # BLD AUTO: 1.8 10E9/L (ref 0–1.3)
MONOCYTES NFR BLD AUTO: 11.6 %
MYELOCYTES # BLD: 0.1 10E9/L
MYELOCYTES NFR BLD MANUAL: 0.9 %
NEUTROPHILS # BLD AUTO: 13.2 10E9/L (ref 1.6–8.3)
NEUTROPHILS NFR BLD AUTO: 83 %
NUM BPU REQUESTED: 2
PLATELET # BLD AUTO: 558 10E9/L (ref 150–450)
PLATELET # BLD EST: ABNORMAL 10*3/UL
POLYCHROMASIA BLD QL SMEAR: SLIGHT
POTASSIUM SERPL-SCNC: 4 MMOL/L (ref 3.4–5.3)
PROT SERPL-MCNC: 6.3 G/DL (ref 6.8–8.8)
RBC # BLD AUTO: 2.83 10E12/L (ref 4.4–5.9)
SODIUM SERPL-SCNC: 139 MMOL/L (ref 133–144)
SPECIMEN EXP DATE BLD: NORMAL
WBC # BLD AUTO: 15.9 10E9/L (ref 4–11)

## 2019-11-21 PROCEDURE — 86901 BLOOD TYPING SEROLOGIC RH(D): CPT | Performed by: NURSE PRACTITIONER

## 2019-11-21 PROCEDURE — 25800030 ZZH RX IP 258 OP 636: Mod: ZF,KP

## 2019-11-21 PROCEDURE — 25000128 H RX IP 250 OP 636: Mod: ZF | Performed by: NURSE PRACTITIONER

## 2019-11-21 PROCEDURE — 25000128 H RX IP 250 OP 636: Mod: ZF

## 2019-11-21 PROCEDURE — 96413 CHEMO IV INFUSION 1 HR: CPT

## 2019-11-21 PROCEDURE — 96361 HYDRATE IV INFUSION ADD-ON: CPT

## 2019-11-21 PROCEDURE — 25800030 ZZH RX IP 258 OP 636: Mod: ZF | Performed by: NURSE PRACTITIONER

## 2019-11-21 PROCEDURE — 86900 BLOOD TYPING SEROLOGIC ABO: CPT | Performed by: NURSE PRACTITIONER

## 2019-11-21 PROCEDURE — 96411 CHEMO IV PUSH ADDL DRUG: CPT

## 2019-11-21 PROCEDURE — 96375 TX/PRO/DX INJ NEW DRUG ADDON: CPT

## 2019-11-21 PROCEDURE — 85025 COMPLETE CBC W/AUTO DIFF WBC: CPT | Performed by: NURSE PRACTITIONER

## 2019-11-21 PROCEDURE — 86850 RBC ANTIBODY SCREEN: CPT | Performed by: NURSE PRACTITIONER

## 2019-11-21 PROCEDURE — 86923 COMPATIBILITY TEST ELECTRIC: CPT | Performed by: NURSE PRACTITIONER

## 2019-11-21 PROCEDURE — 80053 COMPREHEN METABOLIC PANEL: CPT | Performed by: NURSE PRACTITIONER

## 2019-11-21 RX ORDER — SODIUM CHLORIDE 9 MG/ML
INJECTION, SOLUTION INTRAVENOUS
Status: COMPLETED
Start: 2019-11-21 | End: 2019-11-21

## 2019-11-21 RX ORDER — ONDANSETRON 2 MG/ML
8 INJECTION INTRAMUSCULAR; INTRAVENOUS ONCE
Status: COMPLETED | OUTPATIENT
Start: 2019-11-21 | End: 2019-11-21

## 2019-11-21 RX ORDER — AMITRIPTYLINE HYDROCHLORIDE 10 MG/1
10 TABLET ORAL AT BEDTIME
Qty: 30 TABLET | Refills: 1 | Status: SHIPPED | OUTPATIENT
Start: 2019-11-21 | End: 2019-12-12

## 2019-11-21 RX ORDER — HEPARIN SODIUM (PORCINE) LOCK FLUSH IV SOLN 100 UNIT/ML 100 UNIT/ML
5 SOLUTION INTRAVENOUS
Status: DISCONTINUED | OUTPATIENT
Start: 2019-11-21 | End: 2019-11-21 | Stop reason: HOSPADM

## 2019-11-21 RX ORDER — ONDANSETRON 2 MG/ML
INJECTION INTRAMUSCULAR; INTRAVENOUS
Status: COMPLETED
Start: 2019-11-21 | End: 2019-11-21

## 2019-11-21 RX ORDER — SODIUM CHLORIDE 9 MG/ML
INJECTION, SOLUTION INTRAVENOUS CONTINUOUS
Status: ACTIVE | OUTPATIENT
Start: 2019-11-21 | End: 2019-11-21

## 2019-11-21 RX ORDER — CYTARABINE 20 MG/ML
150 INJECTION, SOLUTION INTRATHECAL; INTRAVENOUS; SUBCUTANEOUS ONCE
Status: COMPLETED | OUTPATIENT
Start: 2019-11-21 | End: 2019-11-21

## 2019-11-21 RX ORDER — MERCAPTOPURINE 50 MG/1
TABLET ORAL
Qty: 16 TABLET | Refills: 0 | Status: SHIPPED | OUTPATIENT
Start: 2019-11-21 | End: 2019-12-12

## 2019-11-21 RX ORDER — HEPARIN SODIUM (PORCINE) LOCK FLUSH IV SOLN 100 UNIT/ML 100 UNIT/ML
SOLUTION INTRAVENOUS
Status: COMPLETED
Start: 2019-11-21 | End: 2019-11-21

## 2019-11-21 RX ORDER — CYTARABINE 100 MG/ML
75 INJECTION, SOLUTION INTRATHECAL; INTRAVENOUS; SUBCUTANEOUS DAILY
Qty: 4.5 ML | Refills: 0 | Status: SHIPPED | OUTPATIENT
Start: 2019-11-22 | End: 2019-12-12

## 2019-11-21 RX ADMIN — SODIUM CHLORIDE: 9 INJECTION, SOLUTION INTRAVENOUS at 11:09

## 2019-11-21 RX ADMIN — HEPARIN 5 ML: 100 SYRINGE at 16:50

## 2019-11-21 RX ADMIN — ONDANSETRON 8 MG: 2 INJECTION INTRAMUSCULAR; INTRAVENOUS at 12:44

## 2019-11-21 RX ADMIN — CYTARABINE 150 MG: 20 INJECTION, SOLUTION INTRATHECAL; INTRAVENOUS; SUBCUTANEOUS at 13:43

## 2019-11-21 RX ADMIN — CYCLOPHOSPHAMIDE 2000 MG: 2 INJECTION, POWDER, FOR SOLUTION INTRAVENOUS; ORAL at 13:43

## 2019-11-21 RX ADMIN — SODIUM CHLORIDE: 9 INJECTION, SOLUTION INTRAVENOUS at 14:49

## 2019-11-21 RX ADMIN — HEPARIN SODIUM (PORCINE) LOCK FLUSH IV SOLN 100 UNIT/ML 5 ML: 100 SOLUTION at 16:50

## 2019-11-21 ASSESSMENT — MIFFLIN-ST. JEOR
SCORE: 1781
SCORE: 1781

## 2019-11-21 NOTE — LETTER
"  11/21/2019      RE: Lazaro Lund  [address] [facility] [address]           Pain and Advanced/Complex Care Team (PACCT)   Outpatient Pain Management Clinic, Follow-up Visit    Lazaro Lund MRN#: [id number]   Age: 19 year old YOB: 2000   Date: Nov 21, 2019 Primary care provider: Rodriguez Montoya     Reason and/or Goals of Clinic Visit: symptom assessment, medication management, and treatment adjustment.    Lazaro Lund was accompanied by his grandmother, and I spent a total of 25 minutes face-to-face with them during today s office visit. Over 50% of this time was spent counseling Lazaro and/or coordinating care regarding pain & analgesic management.  The following is a summary of our conversation; additional information was obtained from a review of relevant medical records.  This is his first PACCT clinic visit since hospital discharge on 11/17/19.    SUBJECTIVE ASSESSMENT  History of the Present Illness  Lazaro Lund is a 19-year-old male who was admitted from 11/07 to 11/17 for asparaginase-associated pancreatitis secondary to treatment for T-cell lymphoblastic lymphoma.  PACCT was consulted during this admission for assistance with opioid rotation and tapering.  This consult took place on 11/14/19.  At the time of consult, he was on a hydromorphone continuous infusion + PCA, and I recommended rotation to oral oxycodone and to stop just his continuous infusion, leaving PCA boluses in place.  The PCA boluses were discontinued one day later.  He was discharged on 10 mg oxycodone q4h scheduled, with plans to follow-up with me today for further analgesic management.    Interim History    When asked what things are good and/or better since our last visit, Lazaro states that things are going very well.  He states that his pain is down \"a lot\", and he is no longer nauseated nor is he throwing up.      When asked what things are bad and/or worse since our last visit, Lazaro states " "that he is having significant problems with sleep.    Pain Assessment(s)  First Complaint: Abdominal pain  Onset/History: Abdominal pain (midline upper abdomen) started around 17 with accompanying nausea and intermittent vomiting  Provocation/Palliation:  \"Nothing\" seems to make the pain better.  Lying down and eating a lot make the pain worse.  Quality: Currently, it feels \"like my pancreatitis pain, but less intense\"  Region/Radiation: Left lower quadrant  Severity (since hospital discharge; assessed using the 0-10 Numeric Pain Rating Scale, with 10 being the worst pain imaginable):   Today   Current: 0   Best: 0   Worst: 7   Usual: 5   Timing/frequency/duration: Pain persists \"until I sit up\"    Note that Lazaro states that he is able to function with little to no limitation when his pain is 8 out of 10 or less.    NEW pain complaint(s):    Lazaro does not report any new complaints of pain.      Emergency department (ED) visits since last visit: 0    Hospitalizations since last visit: 0    Assessment of Normal Life Functions (since hospital discharge on )  School Attendance: n/a (not attending school)  Sports Participation: NOT ASSESSED  Social: NOT ASSESSED  Sleep: WORSE   - Time in bed (weekdays): 11 pm   - Time to fall asleep: ~30 minutes   - Out of bed in the mornin am, due to pain. Does not feel rested in the morning.   - Wakes during the night (due to pain): YES, usually every night   - Concerning sleep habits: napping during the day   - Physical indicators of an underlying sleep disorder: None   - Medications used to help with sleep: melatonin    Participation in Rehabilitation Pain Program  Physical Therapy: n/a  Psychology: n/a  Integrative Medicine: n/a    Past Medical/Social & Family History  Reviewed in the EMR; no significant changes were made.    Review of Systems    A comprehensive review of systems was performed, and was negative other than what was described in the interim " history.      OBJECTIVE ASSESSMENT  Medications  The current home analgesic medication regimen for Lazaro Lund consists of the following:  SIMPLE ANALGESIA   - acetaminophen, 500 mg PRN  - Typical use: daily  - Helps with pain: NO    OPIOID THERAPY   - oxycodone, 10 mg q4h PRN  - Typical use: 3 times/day  - Helps with pain: NO    CO-ANALGESIA/NEUROMODULATORS   None    ADJUVANT ANALGESIA   None    Other Pertinent Medications (including OTCs/herbal medications)   - scopolamine patches and ondansetron for nausea/vomiting   - melatonin for insomnia    The [place] Prescription Monitoring Program Database has not been reviewed.    Physical Examination  Vitals: There were no vitals taken for this visit.    Physical exam deferred per patient request.      OVERALL ASSESSMENT  Lazaro Lund is a 19 year old male with  (1) PEG-asparaginase associated pancreatitis  (2) Neuropathic abdominal pain secondary to the above  (3) Insomnia, secondary to abdominal pain  (4) Functional impairments due to chronic pain, including:  (a) Problem with school attendance  (b) Physical deconditioning  (c) Circadian rhythm sleep disturbance    Impact of pain on quality of life: MODERATE. Pain limits some, but not all, areas of function (e.g. school & sleep).      RECOMMENDATIONS/PLAN, COUNSELING & COORDINATION  NEUROPATHIC ABDOMINAL PAIN  INSOMNIA SECONDARY TO ABDOMINAL PAIN   - Start amitriptyline, 10 mg PO qHS  This should help with both his neuropathic abdominal pain, and is sedating, so it should help with sleep.  10 mg once at night is a starting dose.  Therefore, it can be increased up to 25 mg qHS if only somewhat effective.  Doses higher than 25 mg are typically reserved for patients with major depressive disorder.    Follow-Up: With me as needed    Ruddy Campos MD, MAEd   of Pediatrics  Medical Director, Pain and Advanced/Complex Care Team (PACCT)  [facility]  [facility]  PACCT Pager: [phone]      Ruddy Campos MD

## 2019-11-21 NOTE — LETTER
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 Lazaro presented with acute onset cough and was found to have an anterior mediastinal mass and malignant left-sided pleural effusion.  A CT guided biopsy was obtained at [facility] and pathology was consistent with T-cell leukemia vs lymphoma.  He was admitted to [facility] oncology service 8/30 and started on treatment per [id number].  He had a pleural effusion that required a chest tube and a pericardial effusion that was not drained. His Induction was complicated by cardiac compression secondary to his mass leading to tamponade, this improved through out his hospital stay.  He also had dysphagia that improved with treatment of his mass, swallow study was normal. His course was recently complicated by extensive bilateral UE DVTs, he remains on anticoagulation.  Most recently his course was complicated by the development of severe asparaginase induced pancreatitis. He became quite ill with SIRS and associated hypotension, he required pressor support in the ICU.  Lazaro also had severe pain that was initially managed with a PCA and transitioned to oral prior to discharge.  He comes to clinic today for Day 29 of Consolidation, this has been delayed secondary to pancreatitis.    HPI:   Lazaro comes to clinic today with his grandma.  He continues to do well this week.  His appetite is good and he is eating what he is hungry for.  Has tolerated lasagna, pizza and pasta.  His abdominal pain seems to be slowly improving, he states he, at times, forgets to take his oxycodone during the day. His pain is the most bothersome to him at bedtime, it is interfering with his ability to fall asleep.  He is taking oxycodone at bedtime.  He is hoping to talk to the PACCT team about this today.    Lazaro is passing soft stool a few times per  day, denies diarrhea, is not taking laxatives. No skin concerns.  He has not had fever.  No nausea. Denies any motor or sensory changes in his hands/feet.      Review of systems:  Remainder of ROS is complete and negative.    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  He was working full time at [employer] in [place] prior to his diagnosis. He went to the [employer] yesterday and enjoyed some time out.     Current Medications:  Current Outpatient Medications on File Prior to Visit   Medication Sig Dispense Refill     acetaminophen (TYLENOL) 325 MG tablet Take 2 tablets (650 mg) by mouth every 6 hours as needed for mild pain 60 tablet 0     diphenhydrAMINE (BENADRYL) 25 MG capsule Take 1-2 capsules (25-50 mg) by mouth every 6 hours as needed (Breakthrough Nausea and Vomiting ) 30 capsule 1     enoxaparin ANTICOAGULANT (LOVENOX) 100 MG/ML syringe Inject 1 mL (100 mg) Subcutaneous every 12 hours 180 mL 0     melatonin 3 MG tablet Take 1 tablet (3 mg) by mouth At Bedtime 30 tablet 1     ondansetron (ZOFRAN-ODT) 8 MG ODT tab Take 1 tablet (8 mg) by mouth every 6 hours as needed for nausea or vomiting 20 tablet 0     oxyCODONE (ROXICODONE) 10 MG tablet Take 1 tablet (10 mg) by mouth every 4 hours as needed for severe pain 42  "tablet 0     pantoprazole (PROTONIX) 40 MG EC tablet Take 1 tablet (40 mg) by mouth every morning (before breakfast) 30 tablet 0     polyethylene glycol (MIRALAX/GLYCOLAX) packet Take 17 g by mouth daily 30 packet 0     scopolamine (TRANSDERM) 1 MG/3DAYS 72 hr patch Place 1 patch onto the skin every 72 hours 10 patch 3     sennosides (SENOKOT) 8.6 MG tablet Take 2 tablets by mouth 2 times daily as needed for constipation 60 each 1     sulfamethoxazole-trimethoprim (BACTRIM DS/SEPTRA DS) 800-160 MG tablet Take 1 tablet by mouth Every Mon, Tues two times daily 16 tablet 3     Vitamin D, Cholecalciferol, 1000 units TABS Take 2 tablets (2,000 Units) by mouth daily 60 tablet 3   Received influenza vaccine for the 1255-0594 season.      Physical Exam:   Temp:  [98.2  F (36.8  C)] 98.2  F (36.8  C)  Pulse:  [112] 112  BP: (126-131)/(70-85) 126/70  SpO2:  [100 %] 100 %      Wt Readings from Last 4 Encounters:   11/21/19 73.1 kg (161 lb 2.5 oz)   11/19/19 73.5 kg (162 lb 0.6 oz)   11/16/19 73.8 kg (162 lb 11.2 oz)   11/07/19 72.4 kg (159 lb 9.8 oz)     Ht Readings from Last 2 Encounters:   11/21/19 1.84 m (6' 0.44\")   11/19/19 1.843 m (6' 0.56\")     Weight at diagnosis was 75.2kg, Lazaro considers his baseline weight to be 173-175lbs before he initially got sick  General: Lazaro is alert, interactive and appropriate. He appears well, is upbeat and in no obvious pain.  HEENT: Skull is atrauamatic and normocephalic.  The fullness in the right side of his face has improved, no venous distention.  Full head of hair. PERRLA, sclera are non icteric and not injected, EOM are intact, gaze slightly disconjugate which is his baseline. Nares are patent without drainage. Oropharynx clear, no plaques noted. Buccal mucosa and tongue clear. MMM. Tympanic membranes are opaque bilaterally with light reflex and landmarks present.  Voice sounds hoarse but improved from previous.  Lymph:  Neck is supple without lymphadenopathy.  There is no " supraclavicular, axillary or inguinal lymphadenopathy palpated.  Cardiovascular:  HR is tachycardic, S1, S2 no murmur.  Capillary refill is < 2 seconds. Right arm without edema or erythema.  No pitting edema.  Cap refill < 2 sec. Peripheral pulses 2+/=. He has mildly distended veins noted on the left upper chest, not extending up to the neck, overlying the pectoralis.   Respiratory: No cough noted. Respirations are easy.  Lungs are clear to auscultation through out.  No crackles or wheezes.  Gastrointestinal:  BS present in all quadrants.  Abdomen is soft and flat.  Lazaro reports mild LUQ discomfort but this does not increase with palpation. No hepatosplenomegaly or masses are palpated.  Skin: No rashes, bruises or other skin lesions noted.  Neurological:  CN 2-12 grossly intact. Gait is normal.  No issues with balance. Sensation intact in hands and feet.     Musculoskeletal:  Good strength and ROM in all extremities.  Strong dorsiflexion at ankles and great toes (5/5) bilaterally without any pain at the Achilles.    Labs:   Results for orders placed or performed in visit on 11/21/19   CBC with platelets differential     Status: Abnormal   Result Value Ref Range    WBC 15.9 (H) 4.0 - 11.0 10e9/L    RBC Count 2.83 (L) 4.4 - 5.9 10e12/L    Hemoglobin 8.2 (L) 13.3 - 17.7 g/dL    Hematocrit 26.7 (L) 40.0 - 53.0 %    MCV 94 78 - 100 fl    MCH 29.0 26.5 - 33.0 pg    MCHC 30.7 (L) 31.5 - 36.5 g/dL    RDW 22.5 (H) 10.0 - 15.0 %    Platelet Count 558 (H) 150 - 450 10e9/L    Diff Method Manual Differential     % Neutrophils 83.0 %    % Lymphocytes 3.6 %    % Monocytes 11.6 %    % Eosinophils 0.0 %    % Basophils 0.0 %    % Metamyelocytes 0.9 %    % Myelocytes 0.9 %    Absolute Neutrophil 13.2 (H) 1.6 - 8.3 10e9/L    Absolute Lymphocytes 0.6 (L) 0.8 - 5.3 10e9/L    Absolute Monocytes 1.8 (H) 0.0 - 1.3 10e9/L    Absolute Eosinophils 0.0 0.0 - 0.7 10e9/L    Absolute Basophils 0.0 0.0 - 0.2 10e9/L    Absolute Metamyelocytes 0.1  (H) 0 10e9/L    Absolute Myelocytes 0.1 (H) 0 10e9/L    Anisocytosis Moderate     Polychromasia Slight     Microcytes Present     Macrocytes Present     Platelet Estimate Increased    Comprehensive metabolic panel     Status: Abnormal   Result Value Ref Range    Sodium 139 133 - 144 mmol/L    Potassium 4.0 3.4 - 5.3 mmol/L    Chloride 107 94 - 109 mmol/L    Carbon Dioxide 28 20 - 32 mmol/L    Anion Gap 4 3 - 14 mmol/L    Glucose 129 (H) 70 - 99 mg/dL    Urea Nitrogen 7 7 - 30 mg/dL    Creatinine 0.39 (L) 0.66 - 1.25 mg/dL    GFR Estimate >90 >60 mL/min/[1.73_m2]    GFR Estimate If Black >90 >60 mL/min/[1.73_m2]    Calcium 8.0 (L) 8.5 - 10.1 mg/dL    Bilirubin Total 0.3 0.2 - 1.3 mg/dL    Albumin 2.5 (L) 3.4 - 5.0 g/dL    Protein Total 6.3 (L) 6.8 - 8.8 g/dL    Alkaline Phosphatase 292 (H) 40 - 150 U/L    ALT 31 0 - 70 U/L    AST 17 0 - 45 U/L   ABO/Rh type and screen     Status: None   Result Value Ref Range    Units Ordered 2     ABO O     RH(D) Pos     Antibody Screen Neg     Test Valid Only At          [facility],[employer]    Specimen Expires 11/24/2019     Crossmatch Red Blood Cells    Blood component     Status: None   Result Value Ref Range    Unit Number [id number]     Blood Component Type Red Blood Cells LeukoRed Irrad (Part 2)     Division Number 00     Status of Unit Ready for patient 11/21/2019 1231     Blood Product Code [id number]     Unit Status FRANCOISE    Blood component     Status: None   Result Value Ref Range    Unit Number [id number]     Blood Component Type Red Blood Cells LeukoReduced Irradiated     Division Number 00     Status of Unit Ready for patient 11/21/2019 1231     Blood Product Code [id number]     Unit Status FRANCOISE      Assessment:  Lazaro Lund is a 20 year old young man with T cell lymphoblastic lymphoma (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  He is being treated per [facility] protocol [id number].   He's had a CRu, resolution of lymphadenopathy however a small pleural  effusion persists. His course has been complicated by extensive bilateral DVT and most recently severe pancreatitis.  He is tolerating an oral diet and his pancreatitis related pain is well controlled on oral regimen.  His WBC/ANC are trending back to normal.  Abdominal pain is improving, he remains afebrile. He developed an area of pancreatic necrosis and will be following up with adult GI. Asparaginase will be permanently discontinued from his treatment regimen.  He is on lovenox for anticoagulation. 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 Recent Anti-Xa was therapeutic. He is on Vit D for deficiency.  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  Will proceed with Day 29 of Consolidation today.     Plan:   1) Previously discussed elevated WBC with Dr Aguilar (GI) to see if she felt this warranted earlier imaging.  Given Lazaro is doing well clinically will hold off (unless he develops fever or increased pain) and plan to repeat imaging next week with a CT as previously planned.  2) Proceed with D29 therapy, discussed need to schedule zofran for the next 24 hours and utilize PRN benadryl if he has breakthrough nausea.  3) Spoke with [facility] and they will deliver cytarabine, Lazaro will take at home Friday-Sunday.  4) Reviewed at length with Lazaro the s/sx to monitor for and reasons to call.  If he is having any vomiting I've asked him to call.  5) Continue lovenox at current dose, level this week in goal range of 0.6-1.  Will check monthly.  He is aware of the need to hold PM prior and AM of his LPs. Will maintain platelet count > 30K while on anticoagulation.  Recent U/S stable, minor venous distention not surprising given the extent of his known thrombus, no other symptoms to suggest new acute thrombus. Plan to repeat U/S in 1-2 months.  5) Continue Vit D 3 2000IU daily, recheck level in 2 months.  6) Day 29 Induction LP deferred, original plan was to make up on Day 29 of  Consolidation however given his recent complications I will defer this to Maintenance therapy.  7) Given recent pancreatitis and intermediate TPMT phenotype I will dose reduce his 6MP for the 2nd half of Consolidation by 50%.  Reviewed dose with Lazaro.  Will obtain TPMT genotype once that testing becomes available again.   8) Given significant spinal headache history will plan for IV caffeine following LPs in the future.   9) Plan to repeat CT at the end of Consolidation to evaluate small residual pleural effusion.   10) Will need to follow up with Dr. Herb Coronel with adult gastroenterology in 4 weeks as outpatient. He will also need to follow with GI for chronic monitoring for endocrine/exocrine insufficiency.   11) Follow up with PACCT team today for ongoing pain management  12) Continue protonix daily.  13) RTC on Tuesday for labs, exam and possible transfusion.       YOHAN Dunn CNP

## 2019-11-21 NOTE — PROGRESS NOTES
Lazaro came to clinic today for C1 D29 Cyclophosphamide/Cytoxan. Cream on upon arrival to clinic. Port accessed using sterile technique. + blood return noted and labs drawn. Pre fluids infused over 2 hours as ordered. Cytarabine given IVP as ordered with + blood return noted pre/post. Cytoxan infused over 1 hour as ordered with + blood return noted pre/post infusion. Post fluids infused over 2 hours. Port heparin locked and de accessed at the end of the appointment. Pt left in stable condition with grandmother.

## 2019-11-21 NOTE — PROGRESS NOTES
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 Lazaro presented with acute onset cough and was found to have an anterior mediastinal mass and malignant left-sided pleural effusion.  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  He was admitted to [facility] oncology service 8/30 and started on treatment per [id number].  He had a pleural effusion that required a chest tube and a pericardial effusion that was not drained. His Induction was complicated by cardiac compression secondary to his mass leading to tamponade, this improved through out his hospital stay.  He also had dysphagia that improved with treatment of his mass, swallow study was normal. His course was recently complicated by extensive bilateral UE DVTs, he remains on anticoagulation.  Most recently his course was complicated by the development of severe asparaginase induced pancreatitis. He became quite ill with SIRS and associated hypotension, he required pressor support in the ICU.  Lazaro also had severe pain that was initially managed with a PCA and transitioned to oral prior to discharge.  He comes to clinic today for Day 29 of Consolidation, this has been delayed secondary to pancreatitis.    HPI:   Lazaro comes to clinic today with his grandma.  He continues to do well this week.  His appetite is good and he is eating what he is hungry for.  Has tolerated lasagna, pizza and pasta.  His abdominal pain seems to be slowly improving, he states he, at times, forgets to take his oxycodone during the day. His pain is the most bothersome to him at bedtime, it is interfering with his ability to fall asleep.  He is taking oxycodone at bedtime.  He is hoping to talk to the PACCT team about this today.    Lazaro is passing soft stool a few times per day, denies diarrhea, is not taking laxatives. No skin concerns.  He has not  had fever.  No nausea. Denies any motor or sensory changes in his hands/feet.      Review of systems:  Remainder of ROS is complete and negative.    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  He was working full time at [employer] in [place] prior to his diagnosis. He went to the [employer] yesterday and enjoyed some time out.     Current Medications:  Current Outpatient Medications on File Prior to Visit   Medication Sig Dispense Refill     acetaminophen (TYLENOL) 325 MG tablet Take 2 tablets (650 mg) by mouth every 6 hours as needed for mild pain 60 tablet 0     diphenhydrAMINE (BENADRYL) 25 MG capsule Take 1-2 capsules (25-50 mg) by mouth every 6 hours as needed (Breakthrough Nausea and Vomiting ) 30 capsule 1     enoxaparin ANTICOAGULANT (LOVENOX) 100 MG/ML syringe Inject 1 mL (100 mg) Subcutaneous every 12 hours 180 mL 0     melatonin 3 MG tablet Take 1 tablet (3 mg) by mouth At Bedtime 30 tablet 1     ondansetron (ZOFRAN-ODT) 8 MG ODT tab Take 1 tablet (8 mg) by mouth every 6 hours as needed for nausea or vomiting 20 tablet 0     oxyCODONE (ROXICODONE) 10 MG tablet Take 1 tablet (10 mg) by mouth every 4 hours as needed for severe pain 42 tablet 0     pantoprazole (PROTONIX) 40 MG EC tablet Take 1 tablet (40 mg)  "by mouth every morning (before breakfast) 30 tablet 0     polyethylene glycol (MIRALAX/GLYCOLAX) packet Take 17 g by mouth daily 30 packet 0     scopolamine (TRANSDERM) 1 MG/3DAYS 72 hr patch Place 1 patch onto the skin every 72 hours 10 patch 3     sennosides (SENOKOT) 8.6 MG tablet Take 2 tablets by mouth 2 times daily as needed for constipation 60 each 1     sulfamethoxazole-trimethoprim (BACTRIM DS/SEPTRA DS) 800-160 MG tablet Take 1 tablet by mouth Every Mon, Tues two times daily 16 tablet 3     Vitamin D, Cholecalciferol, 1000 units TABS Take 2 tablets (2,000 Units) by mouth daily 60 tablet 3   Received influenza vaccine for the 8894-7459 season.      Physical Exam:   Temp:  [98.2  F (36.8  C)] 98.2  F (36.8  C)  Pulse:  [112] 112  BP: (126-131)/(70-85) 126/70  SpO2:  [100 %] 100 %      Wt Readings from Last 4 Encounters:   11/21/19 73.1 kg (161 lb 2.5 oz)   11/19/19 73.5 kg (162 lb 0.6 oz)   11/16/19 73.8 kg (162 lb 11.2 oz)   11/07/19 72.4 kg (159 lb 9.8 oz)     Ht Readings from Last 2 Encounters:   11/21/19 1.84 m (6' 0.44\")   11/19/19 1.843 m (6' 0.56\")     Weight at diagnosis was 75.2kg, Lazaro considers his baseline weight to be 173-175lbs before he initially got sick  General: Lazaro is alert, interactive and appropriate. He appears well, is upbeat and in no obvious pain.  HEENT: Skull is atrauamatic and normocephalic.  The fullness in the right side of his face has improved, no venous distention.  Full head of hair. PERRLA, sclera are non icteric and not injected, EOM are intact, gaze slightly disconjugate which is his baseline. Nares are patent without drainage. Oropharynx clear, no plaques noted. Buccal mucosa and tongue clear. MMM. Tympanic membranes are opaque bilaterally with light reflex and landmarks present.  Voice sounds hoarse but improved from previous.  Lymph:  Neck is supple without lymphadenopathy.  There is no supraclavicular, axillary or inguinal lymphadenopathy " palpated.  Cardiovascular:  HR is tachycardic, S1, S2 no murmur.  Capillary refill is < 2 seconds. Right arm without edema or erythema.  No pitting edema.  Cap refill < 2 sec. Peripheral pulses 2+/=. He has mildly distended veins noted on the left upper chest, not extending up to the neck, overlying the pectoralis.   Respiratory: No cough noted. Respirations are easy.  Lungs are clear to auscultation through out.  No crackles or wheezes.  Gastrointestinal:  BS present in all quadrants.  Abdomen is soft and flat.  Lazaro reports mild LUQ discomfort but this does not increase with palpation. No hepatosplenomegaly or masses are palpated.  Skin: No rashes, bruises or other skin lesions noted.  Neurological:  CN 2-12 grossly intact. Gait is normal.  No issues with balance. Sensation intact in hands and feet.     Musculoskeletal:  Good strength and ROM in all extremities.  Strong dorsiflexion at ankles and great toes (5/5) bilaterally without any pain at the Achilles.    Labs:   Results for orders placed or performed in visit on 11/21/19   CBC with platelets differential     Status: Abnormal   Result Value Ref Range    WBC 15.9 (H) 4.0 - 11.0 10e9/L    RBC Count 2.83 (L) 4.4 - 5.9 10e12/L    Hemoglobin 8.2 (L) 13.3 - 17.7 g/dL    Hematocrit 26.7 (L) 40.0 - 53.0 %    MCV 94 78 - 100 fl    MCH 29.0 26.5 - 33.0 pg    MCHC 30.7 (L) 31.5 - 36.5 g/dL    RDW 22.5 (H) 10.0 - 15.0 %    Platelet Count 558 (H) 150 - 450 10e9/L    Diff Method Manual Differential     % Neutrophils 83.0 %    % Lymphocytes 3.6 %    % Monocytes 11.6 %    % Eosinophils 0.0 %    % Basophils 0.0 %    % Metamyelocytes 0.9 %    % Myelocytes 0.9 %    Absolute Neutrophil 13.2 (H) 1.6 - 8.3 10e9/L    Absolute Lymphocytes 0.6 (L) 0.8 - 5.3 10e9/L    Absolute Monocytes 1.8 (H) 0.0 - 1.3 10e9/L    Absolute Eosinophils 0.0 0.0 - 0.7 10e9/L    Absolute Basophils 0.0 0.0 - 0.2 10e9/L    Absolute Metamyelocytes 0.1 (H) 0 10e9/L    Absolute Myelocytes 0.1 (H) 0 10e9/L     Anisocytosis Moderate     Polychromasia Slight     Microcytes Present     Macrocytes Present     Platelet Estimate Increased    Comprehensive metabolic panel     Status: Abnormal   Result Value Ref Range    Sodium 139 133 - 144 mmol/L    Potassium 4.0 3.4 - 5.3 mmol/L    Chloride 107 94 - 109 mmol/L    Carbon Dioxide 28 20 - 32 mmol/L    Anion Gap 4 3 - 14 mmol/L    Glucose 129 (H) 70 - 99 mg/dL    Urea Nitrogen 7 7 - 30 mg/dL    Creatinine 0.39 (L) 0.66 - 1.25 mg/dL    GFR Estimate >90 >60 mL/min/[1.73_m2]    GFR Estimate If Black >90 >60 mL/min/[1.73_m2]    Calcium 8.0 (L) 8.5 - 10.1 mg/dL    Bilirubin Total 0.3 0.2 - 1.3 mg/dL    Albumin 2.5 (L) 3.4 - 5.0 g/dL    Protein Total 6.3 (L) 6.8 - 8.8 g/dL    Alkaline Phosphatase 292 (H) 40 - 150 U/L    ALT 31 0 - 70 U/L    AST 17 0 - 45 U/L   ABO/Rh type and screen     Status: None   Result Value Ref Range    Units Ordered 2     ABO O     RH(D) Pos     Antibody Screen Neg     Test Valid Only At          [facility]    Specimen Expires 11/24/2019     Crossmatch Red Blood Cells    Blood component     Status: None   Result Value Ref Range    Unit Number [id number]     Blood Component Type Red Blood Cells LeukoRed Irrad (Part 2)     Division Number 00     Status of Unit Ready for patient 11/21/2019 1231     Blood Product Code [id number]     Unit Status FRANCOISE    Blood component     Status: None   Result Value Ref Range    Unit Number [id number]     Blood Component Type Red Blood Cells LeukoReduced Irradiated     Division Number 00     Status of Unit Ready for patient 11/21/2019 1231     Blood Product Code [id number]     Unit Status FRANCOISE      Assessment:  Lazaro Lund is a 20 year old young man with T cell lymphoblastic lymphoma (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  He is being treated per [facility] protocol [id number].   He's had a CRu, resolution of lymphadenopathy however a small pleural effusion persists. His course has been complicated by  extensive bilateral DVT and most recently severe pancreatitis.  He is tolerating an oral diet and his pancreatitis related pain is well controlled on oral regimen.  His WBC/ANC are trending back to normal.  Abdominal pain is improving, he remains afebrile. He developed an area of pancreatic necrosis and will be following up with adult GI. Asparaginase will be permanently discontinued from his treatment regimen.  He is on lovenox for anticoagulation. 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 Recent Anti-Xa was therapeutic. He is on Vit D for deficiency.  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  Will proceed with Day 29 of Consolidation today.     Plan:   1) Previously discussed elevated WBC with Dr Aguilar (GI) to see if she felt this warranted earlier imaging.  Given Lazaro is doing well clinically will hold off (unless he develops fever or increased pain) and plan to repeat imaging next week with a CT as previously planned.  2) Proceed with D29 therapy, discussed need to schedule zofran for the next 24 hours and utilize PRN benadryl if he has breakthrough nausea.  3) Spoke with [facility] and they will deliver cytarabine, Lazaro will take at home Friday-Sunday.  4) Reviewed at length with Lazaro the s/sx to monitor for and reasons to call.  If he is having any vomiting I've asked him to call.  5) Continue lovenox at current dose, level this week in goal range of 0.6-1.  Will check monthly.  He is aware of the need to hold PM prior and AM of his LPs. Will maintain platelet count > 30K while on anticoagulation.  Recent U/S stable, minor venous distention not surprising given the extent of his known thrombus, no other symptoms to suggest new acute thrombus. Plan to repeat U/S in 1-2 months.  5) Continue Vit D 3 2000IU daily, recheck level in 2 months.  6) Day 29 Induction LP deferred, original plan was to make up on Day 29 of Consolidation however given his recent complications I  will defer this to Maintenance therapy.  7) Given recent pancreatitis and intermediate TPMT phenotype I will dose reduce his 6MP for the 2nd half of Consolidation by 50%.  Reviewed dose with Lazaro.  Will obtain TPMT genotype once that testing becomes available again.   8) Given significant spinal headache history will plan for IV caffeine following LPs in the future.   9) Plan to repeat CT at the end of Consolidation to evaluate small residual pleural effusion.   10) Will need to follow up with Dr. Herb Coronel with adult gastroenterology in 4 weeks as outpatient. He will also need to follow with GI for chronic monitoring for endocrine/exocrine insufficiency.   11) Follow up with PACCT team today for ongoing pain management  12) Continue protonix daily.  13) RTC on Tuesday for labs, exam and possible transfusion.

## 2019-11-22 NOTE — PROGRESS NOTES
This is a recent snapshot of the patient's [facility] medical record.  For current drug dose and complete information and questions, call [phone] or In Basket pool, [facility] home infusion ([id number])  CSN Number:  [id number]

## 2019-11-25 ENCOUNTER — HOSPITAL ENCOUNTER (OUTPATIENT)
Dept: MRI IMAGING | Facility: CLINIC | Age: 19
Discharge: HOME | End: 2019-11-25
Attending: INTERNAL MEDICINE | Admitting: INTERNAL MEDICINE
Payer: MEDICAID

## 2019-11-25 DIAGNOSIS — K85.31 DRUG-INDUCED ACUTE PANCREATITIS WITH UNINFECTED NECROSIS: ICD-10-CM

## 2019-11-25 LAB
BLD PROD TYP BPU: NORMAL
BLD UNIT ID BPU: 0
BLOOD PRODUCT CODE: NORMAL
BPU ID: NORMAL
RADIOLOGIST FLAGS: ABNORMAL
TRANSFUSION STATUS PATIENT QL: NORMAL
TRANSFUSION STATUS PATIENT QL: NORMAL

## 2019-11-25 PROCEDURE — 25800030 ZZH RX IP 258 OP 636: Performed by: INTERNAL MEDICINE

## 2019-11-25 PROCEDURE — 74183 MRI ABD W/O CNTR FLWD CNTR: CPT

## 2019-11-25 PROCEDURE — 25500064 ZZH RX 255 OP 636: Performed by: INTERNAL MEDICINE

## 2019-11-25 PROCEDURE — 25000128 H RX IP 250 OP 636: Performed by: INTERNAL MEDICINE

## 2019-11-25 PROCEDURE — A9585 GADOBUTROL INJECTION: HCPCS | Performed by: INTERNAL MEDICINE

## 2019-11-25 RX ORDER — HEPARIN SODIUM (PORCINE) LOCK FLUSH IV SOLN 100 UNIT/ML 100 UNIT/ML
5 SOLUTION INTRAVENOUS ONCE
Status: COMPLETED | OUTPATIENT
Start: 2019-11-25 | End: 2019-11-25

## 2019-11-25 RX ORDER — GADOBUTROL 604.72 MG/ML
10 INJECTION INTRAVENOUS ONCE
Status: COMPLETED | OUTPATIENT
Start: 2019-11-25 | End: 2019-11-25

## 2019-11-25 RX ADMIN — SODIUM CHLORIDE 80 ML: 9 INJECTION, SOLUTION INTRAVENOUS at 14:15

## 2019-11-25 RX ADMIN — GADOBUTROL 7.3 ML: 604.72 INJECTION INTRAVENOUS at 14:15

## 2019-11-25 RX ADMIN — HEPARIN 5 ML: 100 SYRINGE at 15:00

## 2019-11-25 NOTE — PROGRESS NOTES
Called again regarding f/up with Dr. Coronel in January 2020.     Left message.    Meena Samaniego RN Care Coordinator

## 2019-11-26 ENCOUNTER — OFFICE VISIT (OUTPATIENT)
Dept: PEDIATRIC HEMATOLOGY/ONCOLOGY | Facility: CLINIC | Age: 19
End: 2019-11-26
Payer: MEDICAID

## 2019-11-26 ENCOUNTER — PATIENT OUTREACH (OUTPATIENT)
Dept: GASTROENTEROLOGY | Facility: CLINIC | Age: 19
End: 2019-11-26

## 2019-11-26 ENCOUNTER — INFUSION THERAPY VISIT (OUTPATIENT)
Dept: INFUSION THERAPY | Facility: CLINIC | Age: 19
End: 2019-11-26
Attending: NURSE PRACTITIONER
Payer: MEDICAID

## 2019-11-26 VITALS
HEART RATE: 121 BPM | RESPIRATION RATE: 16 BRPM | DIASTOLIC BLOOD PRESSURE: 76 MMHG | SYSTOLIC BLOOD PRESSURE: 118 MMHG | BODY MASS INDEX: 21.11 KG/M2 | OXYGEN SATURATION: 100 % | TEMPERATURE: 98.6 F | WEIGHT: 155.87 LBS | HEIGHT: 72 IN

## 2019-11-26 DIAGNOSIS — C83.50 T LYMPHOBLASTIC LYMPHOMA (H): ICD-10-CM

## 2019-11-26 DIAGNOSIS — C83.50 T LYMPHOBLASTIC LYMPHOMA (H): Primary | ICD-10-CM

## 2019-11-26 LAB
BASOPHILS # BLD AUTO: 0 10E9/L (ref 0–0.2)
BASOPHILS NFR BLD AUTO: 0.6 %
BLD PROD TYP BPU: NORMAL
BLD UNIT ID BPU: 0
BLOOD PRODUCT CODE: NORMAL
BPU ID: NORMAL
DIFFERENTIAL METHOD BLD: ABNORMAL
EOSINOPHIL # BLD AUTO: 0 10E9/L (ref 0–0.7)
EOSINOPHIL NFR BLD AUTO: 0.4 %
ERYTHROCYTE [DISTWIDTH] IN BLOOD BY AUTOMATED COUNT: 21.2 % (ref 10–15)
HCT VFR BLD AUTO: 25.9 % (ref 40–53)
HGB BLD-MCNC: 8.1 G/DL (ref 13.3–17.7)
IMM GRANULOCYTES # BLD: 0 10E9/L (ref 0–0.4)
IMM GRANULOCYTES NFR BLD: 0.4 %
LYMPHOCYTES # BLD AUTO: 0.6 10E9/L (ref 0.8–5.3)
LYMPHOCYTES NFR BLD AUTO: 11.4 %
MCH RBC QN AUTO: 29.2 PG (ref 26.5–33)
MCHC RBC AUTO-ENTMCNC: 31.3 G/DL (ref 31.5–36.5)
MCV RBC AUTO: 94 FL (ref 78–100)
MONOCYTES # BLD AUTO: 0.3 10E9/L (ref 0–1.3)
MONOCYTES NFR BLD AUTO: 5.3 %
NEUTROPHILS # BLD AUTO: 4 10E9/L (ref 1.6–8.3)
NEUTROPHILS NFR BLD AUTO: 81.9 %
NRBC # BLD AUTO: 0 10*3/UL
NRBC BLD AUTO-RTO: 0 /100
PLATELET # BLD AUTO: 317 10E9/L (ref 150–450)
RBC # BLD AUTO: 2.77 10E12/L (ref 4.4–5.9)
TRANSFUSION STATUS PATIENT QL: NORMAL
TRANSFUSION STATUS PATIENT QL: NORMAL
WBC # BLD AUTO: 4.9 10E9/L (ref 4–11)

## 2019-11-26 PROCEDURE — 36415 COLL VENOUS BLD VENIPUNCTURE: CPT | Performed by: NURSE PRACTITIONER

## 2019-11-26 PROCEDURE — 86901 BLOOD TYPING SEROLOGIC RH(D): CPT | Performed by: NURSE PRACTITIONER

## 2019-11-26 PROCEDURE — 86850 RBC ANTIBODY SCREEN: CPT | Performed by: NURSE PRACTITIONER

## 2019-11-26 PROCEDURE — 85025 COMPLETE CBC W/AUTO DIFF WBC: CPT | Performed by: NURSE PRACTITIONER

## 2019-11-26 PROCEDURE — G0463 HOSPITAL OUTPT CLINIC VISIT: HCPCS | Mod: ZF

## 2019-11-26 PROCEDURE — 86900 BLOOD TYPING SEROLOGIC ABO: CPT | Performed by: NURSE PRACTITIONER

## 2019-11-26 PROCEDURE — 86923 COMPATIBILITY TEST ELECTRIC: CPT | Performed by: NURSE PRACTITIONER

## 2019-11-26 ASSESSMENT — MIFFLIN-ST. JEOR: SCORE: 1755.75

## 2019-11-26 NOTE — NURSING NOTE
"Chief Complaint   Patient presents with     RECHECK     Patient is here today for Lymphoma follow up     /76 (BP Location: Right arm, Patient Position: Fowlers, Cuff Size: Adult Regular)   Pulse 121   Temp 98.6  F (37  C) (Oral)   Resp 16   Ht 1.838 m (6' 0.36\")   Wt 70.7 kg (155 lb 13.8 oz)   SpO2 100%   BMI 20.93 kg/m      Josie Hernández LPN  November 26, 2019  "

## 2019-11-26 NOTE — PROGRESS NOTES
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 Lazaro presented with acute onset cough and was found to have an anterior mediastinal mass and malignant left-sided pleural effusion.  A CT guided biopsy was obtained at [facility] and pathology was consistent with T-cell leukemia vs lymphoma.  He was admitted to [facility] oncology service 8/30 and started on treatment per [id number].  He had a pleural effusion that required a chest tube and a pericardial effusion that was not drained. His Induction was complicated by cardiac compression secondary to his mass leading to tamponade, this improved through out his hospital stay.  He also had dysphagia that improved with treatment of his mass, swallow study was normal. His course was recently complicated by extensive bilateral UE DVTs, he remains on anticoagulation.  Most recently his course was complicated by the development of severe asparaginase induced pancreatitis. He became quite ill with SIRS and associated hypotension, he required pressor support in the ICU.  Lazaro also had severe pain that was initially managed with a PCA and transitioned to oral prior to discharge.  He comes to clinic today for labs and exam, he is Day 24 of Consolidation.    HPI:   Lazaro comes to clinic today alone.  He reports mild nausea after chemo last week but he has continued to eat and drink well.  He is surprised by his weight loss.  He denies GI upset, no pain after eating.  Is not taking pain medications on a routine basis.  Energy level is improving.  Passing soft stool, no diarrhea or constipation.  He started amitriptyline for insomnia, he doesn't think it is helping yet.      Lazaro reports a dry, sore area on his left arm.  This has been present since his hospitalization but he forgot to mention it.  He is not using anything topically.  He reports the dryness is uncomfortable.  He has not had fever.  Denies  any motor or sensory changes in his hands/feet.      Review of systems:  Remainder of ROS is complete and negative.    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  He was working full time at [employer] in [place] prior to his diagnosis. He is looking forward to going to his dad's for Thanksgiving and eating his grandma's special chicken wild rice.     Current Medications:  Current Outpatient Medications on File Prior to Visit   Medication Sig Dispense Refill     acetaminophen (TYLENOL) 325 MG tablet Take 2 tablets (650 mg) by mouth every 6 hours as needed for mild pain 60 tablet 0     amitriptyline (ELAVIL) 10 MG tablet Take 1 tablet (10 mg) by mouth At Bedtime 30 tablet 1     cytarabine, PF, (CYTOSAR) 100 MG/ML injection Inject 1.5 mLs (150 mg) Subcutaneous daily for 3 days Start day after clinic dose. 4.5 mL 0     diphenhydrAMINE (BENADRYL) 25 MG capsule Take 1-2 capsules (25-50 mg) by mouth every 6 hours as needed (Breakthrough Nausea and Vomiting ) 30 capsule 1     enoxaparin ANTICOAGULANT (LOVENOX) 100 MG/ML syringe Inject 1 mL (100 mg) Subcutaneous every 12 hours 180 mL 0     melatonin 3 MG tablet Take 1 tablet (3 mg) by mouth At Bedtime 30 tablet 1     mercaptopurine (PURINETHOL) 50 MG tablet Take 1 tablet  "(50mg) five days/week and 1.5 tablets (75mg) two days/week. Take on Days 29-42. 16 tablet 0     ondansetron (ZOFRAN-ODT) 8 MG ODT tab Take 1 tablet (8 mg) by mouth every 6 hours as needed for nausea or vomiting 20 tablet 0     oxyCODONE (ROXICODONE) 10 MG tablet Take 1 tablet (10 mg) by mouth every 4 hours as needed for severe pain 42 tablet 0     pantoprazole (PROTONIX) 40 MG EC tablet Take 1 tablet (40 mg) by mouth every morning (before breakfast) 30 tablet 0     polyethylene glycol (MIRALAX/GLYCOLAX) packet Take 17 g by mouth daily 30 packet 0     scopolamine (TRANSDERM) 1 MG/3DAYS 72 hr patch Place 1 patch onto the skin every 72 hours 10 patch 3     sennosides (SENOKOT) 8.6 MG tablet Take 2 tablets by mouth 2 times daily as needed for constipation 60 each 1     sulfamethoxazole-trimethoprim (BACTRIM DS/SEPTRA DS) 800-160 MG tablet Take 1 tablet by mouth Every Mon, Tues two times daily 16 tablet 3     Vitamin D, Cholecalciferol, 1000 units TABS Take 2 tablets (2,000 Units) by mouth daily 60 tablet 3   Lazaro confirms he took cytarabine at home Fri-Sun.  Also confirmed 6MP dose, no missed doses.    Received influenza vaccine for the 8551-7798 season.      Physical Exam:   Temp:  [98.6  F (37  C)] 98.6  F (37  C)  Pulse:  [121] 121  Resp:  [16] 16  BP: (118)/(76) 118/76  SpO2:  [100 %] 100 %      Wt Readings from Last 4 Encounters:   11/26/19 70.7 kg (155 lb 13.8 oz)   11/21/19 73.1 kg (161 lb 2.5 oz)   11/19/19 73.5 kg (162 lb 0.6 oz)   11/16/19 73.8 kg (162 lb 11.2 oz)     Ht Readings from Last 2 Encounters:   11/26/19 1.838 m (6' 0.36\")   11/21/19 1.84 m (6' 0.44\")     Weight at diagnosis was 75.2kg,   General: Lazaro is alert, interactive and appropriate. He appears well, is upbeat and in no obvious pain.  HEENT: Skull is atrauamatic and normocephalic.  The fullness in the right side of his face has improved, no venous distention.  Full head of hair. PERRLA, sclera are non icteric and not injected, EOM are " intact, gaze slightly disconjugate which is his baseline. Nares are patent without drainage. Oropharynx clear, no plaques noted. Buccal mucosa and tongue clear. MMM. Tympanic membranes are opaque bilaterally with light reflex and landmarks present.  Voice no longer hoarse.  Lymph:  Neck is supple without lymphadenopathy.  There is no supraclavicular, axillary or inguinal lymphadenopathy palpated.  Cardiovascular:  HR is tachycardic, S1, S2 no murmur.  Capillary refill is < 2 seconds. Right arm without edema or erythema.  No pitting edema.  Cap refill < 2 sec. Peripheral pulses 2+/=. He has mildly distended veins noted on the left upper chest, not extending up to the neck, overlying the pectoralis.   Respiratory: No cough noted. Respirations are easy.  Lungs are clear to auscultation through out.  No crackles or wheezes.  Gastrointestinal:  BS present in all quadrants.  Abdomen is soft and flat.  Lazaro denies LUQ discomfort, no pain with palpation. No hepatosplenomegaly or masses are palpated.  Skin: He has dry red skin on his left arm around the circumference of his bicep. No rashes, bruises or other skin lesions noted.  Neurological:  CN 2-12 grossly intact. Gait is normal.  No issues with balance. Sensation intact in hands and feet.     Musculoskeletal:  Good strength and ROM in all extremities.  Strong dorsiflexion at ankles and great toes (5/5) bilaterally without any pain at the Achilles.    Labs:   Results for orders placed or performed in visit on 11/26/19   CBC with platelets differential     Status: Abnormal   Result Value Ref Range    WBC 4.9 4.0 - 11.0 10e9/L    RBC Count 2.77 (L) 4.4 - 5.9 10e12/L    Hemoglobin 8.1 (L) 13.3 - 17.7 g/dL    Hematocrit 25.9 (L) 40.0 - 53.0 %    MCV 94 78 - 100 fl    MCH 29.2 26.5 - 33.0 pg    MCHC 31.3 (L) 31.5 - 36.5 g/dL    RDW 21.2 (H) 10.0 - 15.0 %    Platelet Count 317 150 - 450 10e9/L    Diff Method Automated Method     % Neutrophils 81.9 %    % Lymphocytes 11.4 %     % Monocytes 5.3 %    % Eosinophils 0.4 %    % Basophils 0.6 %    % Immature Granulocytes 0.4 %    Nucleated RBCs 0 0 /100    Absolute Neutrophil 4.0 1.6 - 8.3 10e9/L    Absolute Lymphocytes 0.6 (L) 0.8 - 5.3 10e9/L    Absolute Monocytes 0.3 0.0 - 1.3 10e9/L    Absolute Eosinophils 0.0 0.0 - 0.7 10e9/L    Absolute Basophils 0.0 0.0 - 0.2 10e9/L    Abs Immature Granulocytes 0.0 0 - 0.4 10e9/L    Absolute Nucleated RBC 0.0    ABO/Rh type and screen     Status: None   Result Value Ref Range    ABO O     RH(D) Pos     Antibody Screen Neg     Test Valid Only At          [facility]    Specimen Expires 11/29/2019      MRCP from yesterday:                                                                   IMPRESSION: In this patient with history of recent asparaginase  associated pancreatitis:  1a. Evolving necrotizing pancreatitis with improved peripancreatic  inflammatory changes, mesenteric edema, small volume of ascites. Fluid  collections adjacent to the stomach, spleen and anteriorly to the  pancreatic body/tail are more conspicuous from the prior study.   1b. Focal area of pancreatic necrosis in the mid pancreatic body  appears to communicate with the main pancreatic duct with upstream  dilatation of the pancreatic duct, tortuous and irregular.  Constellation of imaging findings is concerning for discontinuity of  pancreatic duct most likely with associated pancreatic duct leak.   1c. Attenuation of the splenic vein at the pancreatic body and tail  however with preserved patency. No pseudoaneurysm of the splenic  artery.  2- small left pleural effusion with overlying atelectasis. No right  pleural effusion. Small pericardial effusion.  3. Hepatic steatosis. No focal liver lesions.    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  He is being treated per [facility] protocol [id number].   He's had a CRu, resolution  of lymphadenopathy however a small pleural effusion persists. His course has been complicated by extensive bilateral DVT and most recently severe pancreatitis.  He is tolerating an oral diet and his pancreatitis related pain has improved.  He had a MRCP yesterday which showed necrotizing pancreatitis and possible discontinuity of the pancreatic duct.  Asparaginase will be permanently discontinued from his treatment regimen.  He is on lovenox for anticoagulation. 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 Recent Anti-Xa was therapeutic. He is on Vit D for deficiency.  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  He is tolerating chemotherapy well this past week.  Insomnia remains an issue despite amitriptyline. Is Day 34 of Consolidation today.  Contact dermatitis on left arm.    Plan:   1) Discussed MRCP findings with Dr Coronel.  He is comfortable proceeding with close observation at this time unless Lazaro would develop fever, abdominal pain or pain after eating.  Lazaro will see Dr Coronel for follow up mid-December, he will have a CT to monitor fluid collection at that time.  If intervention is needed Dr Coronel plans to do an endoscopic approach.  Discussed this information with Lazaro today.  Stressed the importance of reporting any change in his abdominal symptoms.  2) Reviewed labs with Lazaro, no transfusion needed today.   3) Reviewed at length with Lazaro the s/sx to monitor for and reasons to call.  If he is having any vomiting I've asked him to call.  4) Continue lovenox at current dose, recent level in goal range of 0.6-1.  Will check monthly.  He is aware of the need to hold PM prior and AM of his LPs. Will maintain platelet count > 30K while on anticoagulation.  Recent U/S stable, minor venous distention not surprising given the extent of his known thrombus, no other symptoms to suggest new acute thrombus. Plan to repeat U/S in 1-2 months.  5) Continue Vit D 3  2000IU daily, recheck level in 2 months.  6) Day 29 Induction LP deferred, original plan was to make up on Day 29 of Consolidation however given his recent complications I will defer this to Maintenance therapy.  7) Given recent pancreatitis and intermediate TPMT phenotype I reduced his 6MP for the 2nd half of Consolidation by 50%.  Reviewed dose with Lazaro.  Will obtain TPMT genotype once that testing becomes available again.   8) Given significant spinal headache history will plan for IV caffeine following LPs in the future.   9) Plan to repeat CT at the end of Consolidation to evaluate small residual pleural effusion.   10) Follow up with PACCT team if insomnia persists.  11) Continue protonix daily.  12) Recommend emollient (aquaphor or eucerin) mixed with 1% hydrocortisone BID to dermatitis on arm, will continue to follow.  13) RTC on Friday for Day 36 of therapy with cytarabine (one day late due to holiday).  Day 37-39 doses to be delivered by [facility], will need to call Friday and confirm plan for delivery.

## 2019-11-26 NOTE — PROGRESS NOTES
Lazaro came to clinic for a possible PRBC transfusion. Labs drawn by the [facility]. Hgb 8.1; pt did not meet parameters for transfusion. Pt seen and assessed by Luana Van NP. Pt left clinic in stable condition at end of cares.

## 2019-11-26 NOTE — PROGRESS NOTES
MRI done yestrday- radiology called with urgent finding. Alerting Dr. Berna Samaniego, RN Care Coordinator]

## 2019-11-27 LAB
ABO + RH BLD: NORMAL
ABO + RH BLD: NORMAL
BLD GP AB SCN SERPL QL: NORMAL
BLD PROD TYP BPU: NORMAL
BLOOD BANK CMNT PATIENT-IMP: NORMAL
NUM BPU REQUESTED: 2
SPECIMEN EXP DATE BLD: NORMAL

## 2019-11-29 ENCOUNTER — INFUSION THERAPY VISIT (OUTPATIENT)
Dept: INFUSION THERAPY | Facility: CLINIC | Age: 19
End: 2019-11-29
Attending: NURSE PRACTITIONER
Payer: MEDICAID

## 2019-11-29 ENCOUNTER — OFFICE VISIT (OUTPATIENT)
Dept: PEDIATRIC HEMATOLOGY/ONCOLOGY | Facility: CLINIC | Age: 19
End: 2019-11-29
Attending: NURSE PRACTITIONER
Payer: MEDICAID

## 2019-11-29 ENCOUNTER — HOME INFUSION (PRE-WILLOW HOME INFUSION) (OUTPATIENT)
Dept: PHARMACY | Facility: CLINIC | Age: 19
End: 2019-11-29

## 2019-11-29 VITALS
TEMPERATURE: 97.8 F | BODY MASS INDEX: 22.04 KG/M2 | OXYGEN SATURATION: 100 % | HEART RATE: 98 BPM | SYSTOLIC BLOOD PRESSURE: 99 MMHG | DIASTOLIC BLOOD PRESSURE: 53 MMHG | HEIGHT: 72 IN | WEIGHT: 162.7 LBS | RESPIRATION RATE: 18 BRPM

## 2019-11-29 DIAGNOSIS — C83.50 T LYMPHOBLASTIC LYMPHOMA (H): Primary | ICD-10-CM

## 2019-11-29 LAB
BASOPHILS # BLD AUTO: 0 10E9/L (ref 0–0.2)
BASOPHILS NFR BLD AUTO: 1.6 %
BLD PROD TYP BPU: NORMAL
BLD PROD TYP BPU: NORMAL
BLD UNIT ID BPU: 0
BLD UNIT ID BPU: 0
BLOOD PRODUCT CODE: NORMAL
BLOOD PRODUCT CODE: NORMAL
BPU ID: NORMAL
BPU ID: NORMAL
DIFFERENTIAL METHOD BLD: ABNORMAL
EOSINOPHIL # BLD AUTO: 0 10E9/L (ref 0–0.7)
EOSINOPHIL NFR BLD AUTO: 0.4 %
ERYTHROCYTE [DISTWIDTH] IN BLOOD BY AUTOMATED COUNT: 20.4 % (ref 10–15)
HCT VFR BLD AUTO: 19.8 % (ref 40–53)
HGB BLD-MCNC: 6.2 G/DL (ref 13.3–17.7)
IMM GRANULOCYTES # BLD: 0 10E9/L (ref 0–0.4)
IMM GRANULOCYTES NFR BLD: 0.8 %
LMWH PPP CHRO-ACNC: 0.77 IU/ML
LYMPHOCYTES # BLD AUTO: 0.7 10E9/L (ref 0.8–5.3)
LYMPHOCYTES NFR BLD AUTO: 26.2 %
MCH RBC QN AUTO: 29.7 PG (ref 26.5–33)
MCHC RBC AUTO-ENTMCNC: 31.3 G/DL (ref 31.5–36.5)
MCV RBC AUTO: 95 FL (ref 78–100)
MONOCYTES # BLD AUTO: 0.2 10E9/L (ref 0–1.3)
MONOCYTES NFR BLD AUTO: 8.2 %
NEUTROPHILS # BLD AUTO: 1.6 10E9/L (ref 1.6–8.3)
NEUTROPHILS NFR BLD AUTO: 62.8 %
NRBC # BLD AUTO: 0 10*3/UL
NRBC BLD AUTO-RTO: 1 /100
PLATELET # BLD AUTO: 141 10E9/L (ref 150–450)
RBC # BLD AUTO: 2.09 10E12/L (ref 4.4–5.9)
TRANSFUSION STATUS PATIENT QL: NORMAL
WBC # BLD AUTO: 2.6 10E9/L (ref 4–11)

## 2019-11-29 PROCEDURE — 25800030 ZZH RX IP 258 OP 636: Mod: ZF | Performed by: NURSE PRACTITIONER

## 2019-11-29 PROCEDURE — 25000128 H RX IP 250 OP 636: Mod: ZF

## 2019-11-29 PROCEDURE — 25000128 H RX IP 250 OP 636: Mod: ZF | Performed by: PEDIATRICS

## 2019-11-29 PROCEDURE — 85025 COMPLETE CBC W/AUTO DIFF WBC: CPT | Performed by: PEDIATRICS

## 2019-11-29 PROCEDURE — 96375 TX/PRO/DX INJ NEW DRUG ADDON: CPT

## 2019-11-29 PROCEDURE — P9040 RBC LEUKOREDUCED IRRADIATED: HCPCS | Performed by: NURSE PRACTITIONER

## 2019-11-29 PROCEDURE — 96409 CHEMO IV PUSH SNGL DRUG: CPT

## 2019-11-29 PROCEDURE — 85520 HEPARIN ASSAY: CPT | Performed by: PEDIATRICS

## 2019-11-29 PROCEDURE — G0463 HOSPITAL OUTPT CLINIC VISIT: HCPCS | Mod: ZF

## 2019-11-29 PROCEDURE — 36430 TRANSFUSION BLD/BLD COMPNT: CPT

## 2019-11-29 RX ORDER — ONDANSETRON 2 MG/ML
8 INJECTION INTRAMUSCULAR; INTRAVENOUS ONCE
Status: COMPLETED | OUTPATIENT
Start: 2019-11-29 | End: 2019-11-29

## 2019-11-29 RX ORDER — CYTARABINE 100 MG/ML
75 INJECTION, SOLUTION INTRATHECAL; INTRAVENOUS; SUBCUTANEOUS DAILY
Qty: 4.5 ML | Refills: 0 | Status: SHIPPED | OUTPATIENT
Start: 2019-11-30 | End: 2019-12-12

## 2019-11-29 RX ORDER — CYTARABINE 20 MG/ML
150 INJECTION, SOLUTION INTRATHECAL; INTRAVENOUS; SUBCUTANEOUS ONCE
Status: COMPLETED | OUTPATIENT
Start: 2019-11-29 | End: 2019-11-29

## 2019-11-29 RX ORDER — HEPARIN SODIUM (PORCINE) LOCK FLUSH IV SOLN 100 UNIT/ML 100 UNIT/ML
5 SOLUTION INTRAVENOUS
Status: DISCONTINUED | OUTPATIENT
Start: 2019-11-29 | End: 2019-11-29 | Stop reason: HOSPADM

## 2019-11-29 RX ORDER — HEPARIN SODIUM (PORCINE) LOCK FLUSH IV SOLN 100 UNIT/ML 100 UNIT/ML
SOLUTION INTRAVENOUS
Status: COMPLETED
Start: 2019-11-29 | End: 2019-11-29

## 2019-11-29 RX ORDER — ONDANSETRON 2 MG/ML
INJECTION INTRAMUSCULAR; INTRAVENOUS
Status: COMPLETED
Start: 2019-11-29 | End: 2019-11-29

## 2019-11-29 RX ADMIN — ONDANSETRON 8 MG: 2 INJECTION INTRAMUSCULAR; INTRAVENOUS at 09:11

## 2019-11-29 RX ADMIN — SODIUM CHLORIDE 100 ML: 9 INJECTION, SOLUTION INTRAVENOUS at 09:54

## 2019-11-29 RX ADMIN — HEPARIN 5 ML: 100 SYRINGE at 13:37

## 2019-11-29 RX ADMIN — HEPARIN SODIUM (PORCINE) LOCK FLUSH IV SOLN 100 UNIT/ML 5 ML: 100 SOLUTION at 13:37

## 2019-11-29 RX ADMIN — CYTARABINE 150 MG: 20 INJECTION, SOLUTION INTRATHECAL; INTRAVENOUS; SUBCUTANEOUS at 13:32

## 2019-11-29 ASSESSMENT — MIFFLIN-ST. JEOR: SCORE: 1788

## 2019-11-29 NOTE — PROGRESS NOTES
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 Lazaro presented with acute onset cough and was found to have an anterior mediastinal mass and malignant left-sided pleural effusion.  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  He was admitted to [facility] oncology service 8/30 and started on treatment per [id number].  He had a pleural effusion that required a chest tube and a pericardial effusion that was not drained. His Induction was complicated by cardiac compression secondary to his mass leading to tamponade, this improved through out his hospital stay.  He also had dysphagia that improved with treatment of his mass, swallow study was normal. His course was recently complicated by extensive bilateral UE DVTs, he remains on anticoagulation.  Most recently his course was complicated by the development of severe asparaginase induced pancreatitis. He became quite ill with SIRS and associated hypotension, he required pressor support in the ICU.  Lazaro also had severe pain that was initially managed with a PCA and transitioned to oral prior to discharge.  He comes to clinic today for labs and exam, he is Day 36 of Consolidation (of note one day late due to holiday).    HPI:   Lazaro comes to clinic today with his grandmother. No issues with nausea.  He does have intermittent stomach discomfort in the LUQ area but he feels like it is manageable.  He doesn't feel like the oxycodone is helpful so he hasn't been using it.   He has continued to eat and drink well.    Energy level is improving but he is tired some.  Passing soft stool, no diarrhea or constipation.  He started amitriptyline for insomnia, he doesn't think it is helping yet.  He hasn't been taking melatonin but interested in retrying that.    Lazaro reports a dry, sore area on his left arm.  He hasn't been using the creams  yet and it is the same to improved.  He has not had fever.  Denies any motor or sensory changes in his hands/feet.  Forgot lovenox last night but took it this AM.    Review of systems:  Remainder of ROS is complete and negative.    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  He was working full time at [employer] in [place] prior to his diagnosis. He is looking forward to going to his dad's for Thanksgiving and eating his grandma's special chicken wild rice.     Current Medications:  Current Outpatient Medications on File Prior to Visit   Medication Sig Dispense Refill     acetaminophen (TYLENOL) 325 MG tablet Take 2 tablets (650 mg) by mouth every 6 hours as needed for mild pain 60 tablet 0     amitriptyline (ELAVIL) 10 MG tablet Take 1 tablet (10 mg) by mouth At Bedtime 30 tablet 1     [START ON 11/30/2019] cytarabine, PF, (CYTOSAR) 100 MG/ML injection Inject 1.5 mLs (150 mg) Subcutaneous daily for 3 days Start day after clinic dose. 4.5 mL 0     cytarabine, PF, (CYTOSAR) 100 MG/ML injection Inject 1.5 mLs (150 mg) Subcutaneous daily for 3 days Start day after clinic dose. 4.5 mL 0     diphenhydrAMINE (BENADRYL) 25 MG capsule Take 1-2 capsules (25-50 mg) by mouth every 6 hours as needed (Breakthrough Nausea and  "Vomiting ) 30 capsule 1     enoxaparin ANTICOAGULANT (LOVENOX) 100 MG/ML syringe Inject 1 mL (100 mg) Subcutaneous every 12 hours 180 mL 0     melatonin 3 MG tablet Take 1 tablet (3 mg) by mouth At Bedtime 30 tablet 1     mercaptopurine (PURINETHOL) 50 MG tablet Take 1 tablet (50mg) five days/week and 1.5 tablets (75mg) two days/week. Take on Days 29-42. 16 tablet 0     ondansetron (ZOFRAN-ODT) 8 MG ODT tab Take 1 tablet (8 mg) by mouth every 6 hours as needed for nausea or vomiting 20 tablet 0     oxyCODONE (ROXICODONE) 10 MG tablet Take 1 tablet (10 mg) by mouth every 4 hours as needed for severe pain 42 tablet 0     pantoprazole (PROTONIX) 40 MG EC tablet Take 1 tablet (40 mg) by mouth every morning (before breakfast) 30 tablet 0     polyethylene glycol (MIRALAX/GLYCOLAX) packet Take 17 g by mouth daily 30 packet 0     scopolamine (TRANSDERM) 1 MG/3DAYS 72 hr patch Place 1 patch onto the skin every 72 hours 10 patch 3     sennosides (SENOKOT) 8.6 MG tablet Take 2 tablets by mouth 2 times daily as needed for constipation 60 each 1     sulfamethoxazole-trimethoprim (BACTRIM DS/SEPTRA DS) 800-160 MG tablet Take 1 tablet by mouth Every Mon, Tues two times daily 16 tablet 3     Vitamin D, Cholecalciferol, 1000 units TABS Take 2 tablets (2,000 Units) by mouth daily 60 tablet 3   Lazaro confirms he took cytarabine at home Fri-Sun.  Also confirmed 6MP dose, no missed doses.    Received influenza vaccine for the [id number] season.      Physical Exam:   Temp:  [98  F (36.7  C)-98.4  F (36.9  C)] 98.4  F (36.9  C)  Pulse:  [] 86  Resp:  [18-20] 18  BP: (116-120)/(70-71) 120/70  SpO2:  [98 %-100 %] 100 %      Wt Readings from Last 4 Encounters:   11/29/19 73.8 kg (162 lb 11.2 oz)   11/26/19 70.7 kg (155 lb 13.8 oz)   11/21/19 73.1 kg (161 lb 2.5 oz)   11/19/19 73.5 kg (162 lb 0.6 oz)     Ht Readings from Last 2 Encounters:   11/29/19 1.84 m (6' 0.44\")   11/26/19 1.838 m (6' 0.36\")     Weight at diagnosis was 75.2kg, "   General: Lazaro is alert, interactive and appropriate. He appears well, is upbeat and in no obvious pain.  HEENT: Skull is atrauamatic and normocephalic.  The fullness in the right side of his face has improved, no venous distention.  Full head of hair. PERRLA, sclera are non icteric and not injected, EOM are intact, gaze slightly disconjugate which is his baseline. Nares are patent without drainage. Oropharynx clear, no plaques noted. Buccal mucosa and tongue clear. MMM. Tympanic membranes are opaque bilaterally with light reflex and landmarks present.  Voice no longer hoarse.  Lymph:  Neck is supple without lymphadenopathy.  There is no supraclavicular, axillary or inguinal lymphadenopathy palpated.  Cardiovascular:  HR is tachycardic, S1, S2 no murmur.  Capillary refill is < 2 seconds. Right arm without edema or erythema.  No pitting edema.  Cap refill < 2 sec. Peripheral pulses 2+/=. He has mildly distended veins noted on the left upper chest, not extending up to the neck, overlying the pectoralis.   Respiratory: No cough noted. Respirations are easy.  Lungs are clear to auscultation through out.  No crackles or wheezes.  Gastrointestinal:  BS present in all quadrants.  Abdomen is soft and flat.  Lazaro denies LUQ discomfort, no pain with palpation. No hepatosplenomegaly or masses are palpated.  Skin: He has dry  skin on his left arm (no redness) around the circumference of his bicep. No rashes, bruises or other skin lesions noted.  Neurological:  CN 2-12 grossly intact. Gait is normal.  No issues with balance. Sensation intact in hands and feet.     Musculoskeletal:  Good strength and ROM in all extremities.  Strong dorsiflexion at ankles and great toes (5/5) bilaterally without any pain at the Achilles.    Labs:   Results for orders placed or performed in visit on 11/29/19   CBC with platelets differential     Status: Abnormal   Result Value Ref Range    WBC 2.6 (L) 4.0 - 11.0 10e9/L    RBC Count 2.09 (L)  4.4 - 5.9 10e12/L    Hemoglobin 6.2 (LL) 13.3 - 17.7 g/dL    Hematocrit 19.8 (L) 40.0 - 53.0 %    MCV 95 78 - 100 fl    MCH 29.7 26.5 - 33.0 pg    MCHC 31.3 (L) 31.5 - 36.5 g/dL    RDW 20.4 (H) 10.0 - 15.0 %    Platelet Count 141 (L) 150 - 450 10e9/L    Diff Method Automated Method     % Neutrophils 62.8 %    % Lymphocytes 26.2 %    % Monocytes 8.2 %    % Eosinophils 0.4 %    % Basophils 1.6 %    % Immature Granulocytes 0.8 %    Nucleated RBCs 1 (H) 0 /100    Absolute Neutrophil 1.6 1.6 - 8.3 10e9/L    Absolute Lymphocytes 0.7 (L) 0.8 - 5.3 10e9/L    Absolute Monocytes 0.2 0.0 - 1.3 10e9/L    Absolute Eosinophils 0.0 0.0 - 0.7 10e9/L    Absolute Basophils 0.0 0.0 - 0.2 10e9/L    Abs Immature Granulocytes 0.0 0 - 0.4 10e9/L    Absolute Nucleated RBC 0.0    Heparin 10a Level     Status: None   Result Value Ref Range    Heparin 10A Level 0.77 IU/mL       Assessment:  Lazaro Lund is a 20 year old young man with T cell lymphoblastic lymphoma (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  He is being treated per [facility] protocol [id number].   He's had a CRu, resolution of lymphadenopathy however a small pleural effusion persists. His course has been complicated by extensive bilateral DVT and most recently severe pancreatitis.  He is tolerating an oral diet and his pancreatitis related pain has improved.  He had a MRCP yesterday which showed necrotizing pancreatitis and possible discontinuity of the pancreatic duct.  Asparaginase will be permanently discontinued from his treatment regimen.  He is on lovenox for anticoagulation. 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 Recent Anti-Xa was therapeutic. He is on Vit D for deficiency.  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  He is tolerating chemotherapy well this past week.  Insomnia remains an issue despite amitriptyline. Is Day 36 of Consolidation today.  Contact dermatitis on left arm.    Plan:   1) Day 36 cytarabine today.  RN called [facility] to  confirm delivery for Doses 37-39.  2) Reviewed labs with Lazaro, no transfusion needed today.   3) Reviewed at length with Lazaro the s/sx to monitor for and reasons to call.  If he is having any vomiting I've asked him to call.  4) Continue lovenox at current dose, recent level in goal range of 0.6-1.  Will check monthly.  Today 0.77.  He is aware of the need to hold PM prior and AM of his LPs. Will maintain platelet count > 30K while on anticoagulation.  Recent U/S stable, minor venous distention not surprising given the extent of his known thrombus, no other symptoms to suggest new acute thrombus. Plan to repeat U/S in 1-2 months.  5) Continue Vit D 3 2000IU daily, recheck level in 2 months.  6) Day 29 Induction LP deferred, original plan was to make up on Day 29 of Consolidation however given his recent complications I will defer this to Maintenance therapy.  7) Given recent pancreatitis and intermediate TPMT phenotype I reduced his 6MP for the 2nd half of Consolidation by 50%.  Reviewed dose with Lazaro.  Will obtain TPMT genotype once that testing becomes available again.   8) Given significant spinal headache history will plan for IV caffeine following LPs in the future.   9) Plan to repeat CT at the end of Consolidation to evaluate small residual pleural effusion.   10) Follow up with PACCT team if insomnia persists.  11) Continue protonix daily.  12) Recommend emollient (aquaphor or eucerin or Vanicream) mixed with 1% hydrocortisone BID to dermatitis on arm, will continue to follow.  13) RTC on Thursday 12/5 for vincristine and possible transfusion.      Mila Montague MSN, APRN, CPNP-AC, CPON  Department of Pediatrics  Division of Hematology/Oncology

## 2019-11-29 NOTE — PROGRESS NOTES
Infusion Nursing Note    Lazaro Lund Presents to [facility] today for:Chemotherapy - Cycle 1 Day 36 Cytarabine and possible transfusions.    Due to : T lymphoblastic lymphoma (H)    Patient seen by Provider : Yes: Mila Montague NP    Note: Pt arrived to [facility] for C1D36 Cytarabine. Pt denies any recent fever/infections. Port accessed by sterile technique and labs drawn as ordered. Hbg 6.2.  Consent obtained by NP and 2 units of PRBCs infused over about 2 hours each unit without issue.  Cytarabine given IV Push; positive blood return noted pre/post. Port heplocked and deaccessed. VSS throughout. Stable pt left clinic at completion of cares with grandmother.    Intravenous Access: Yes: Port    Treatment conditions:     Parameters Met for treatment    Pre-Meds:Yes: Zofran given IV push prior to chemotherapy    Education provided: Yes: Plan of Care    Post Infusion Assessment: Pt. Tolerated Cytarabine infusion without issue.    Discharge Plan:     Patient verbalized understanding of discharge instructions, all questions answered. Patient left clinic accompanied by grandmother. Confirmed home care orders with [facility].

## 2019-11-29 NOTE — LETTER
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 Lazaro presented with acute onset cough and was found to have an anterior mediastinal mass and malignant left-sided pleural effusion.  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  He was admitted to [facility] oncology service 8/30 and started on treatment per [id number].  He had a pleural effusion that required a chest tube and a pericardial effusion that was not drained. His Induction was complicated by cardiac compression secondary to his mass leading to tamponade, this improved through out his hospital stay.  He also had dysphagia that improved with treatment of his mass, swallow study was normal. His course was recently complicated by extensive bilateral UE DVTs, he remains on anticoagulation.  Most recently his course was complicated by the development of severe asparaginase induced pancreatitis. He became quite ill with SIRS and associated hypotension, he required pressor support in the ICU.  Lazaro also had severe pain that was initially managed with a PCA and transitioned to oral prior to discharge.  He comes to clinic today for labs and exam, he is Day 36 of Consolidation (of note one day late due to holiday).    HPI:   Laazro comes to clinic today with his grandmother. No issues with nausea.  He does have intermittent stomach discomfort in the LUQ area but he feels like it is manageable.  He doesn't feel like the oxycodone is helpful so he hasn't been using it.   He has continued to eat and drink well.    Energy level is improving but he is tired some.  Passing soft stool, no diarrhea or constipation.  He started amitriptyline for insomnia, he doesn't think it is helping yet.  He hasn't been taking melatonin but interested in retrying  that.    Lazaro reports a dry, sore area on his left arm.  He hasn't been using the creams yet and it is the same to improved.  He has not had fever.  Denies any motor or sensory changes in his hands/feet.  Forgot lovenox last night but took it this AM.    Review of systems:  Remainder of ROS is complete and negative.    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  He was working full time at [employer] in [place] prior to his diagnosis. He is looking forward to going to his dad's for Thanksgiving and eating his grandma's special chicken wild rice.     Current Medications:  Current Outpatient Medications on File Prior to Visit   Medication Sig Dispense Refill     acetaminophen (TYLENOL) 325 MG tablet Take 2 tablets (650 mg) by mouth every 6 hours as needed for mild pain 60 tablet 0     amitriptyline (ELAVIL) 10 MG tablet Take 1 tablet (10 mg) by mouth At Bedtime 30 tablet 1     [START ON 11/30/2019] cytarabine, PF, (CYTOSAR) 100 MG/ML injection Inject 1.5 mLs (150 mg) Subcutaneous daily for 3 days Start day after clinic dose. 4.5 mL 0     cytarabine, PF, (CYTOSAR) 100 MG/ML injection Inject 1.5 mLs (150 mg) Subcutaneous daily for 3 days Start day after clinic dose. 4.5 mL 0     diphenhydrAMINE (BENADRYL) 25 MG  capsule Take 1-2 capsules (25-50 mg) by mouth every 6 hours as needed (Breakthrough Nausea and Vomiting ) 30 capsule 1     enoxaparin ANTICOAGULANT (LOVENOX) 100 MG/ML syringe Inject 1 mL (100 mg) Subcutaneous every 12 hours 180 mL 0     melatonin 3 MG tablet Take 1 tablet (3 mg) by mouth At Bedtime 30 tablet 1     mercaptopurine (PURINETHOL) 50 MG tablet Take 1 tablet (50mg) five days/week and 1.5 tablets (75mg) two days/week. Take on Days 29-42. 16 tablet 0     ondansetron (ZOFRAN-ODT) 8 MG ODT tab Take 1 tablet (8 mg) by mouth every 6 hours as needed for nausea or vomiting 20 tablet 0     oxyCODONE (ROXICODONE) 10 MG tablet Take 1 tablet (10 mg) by mouth every 4 hours as needed for severe pain 42 tablet 0     pantoprazole (PROTONIX) 40 MG EC tablet Take 1 tablet (40 mg) by mouth every morning (before breakfast) 30 tablet 0     polyethylene glycol (MIRALAX/GLYCOLAX) packet Take 17 g by mouth daily 30 packet 0     scopolamine (TRANSDERM) 1 MG/3DAYS 72 hr patch Place 1 patch onto the skin every 72 hours 10 patch 3     sennosides (SENOKOT) 8.6 MG tablet Take 2 tablets by mouth 2 times daily as needed for constipation 60 each 1     sulfamethoxazole-trimethoprim (BACTRIM DS/SEPTRA DS) 800-160 MG tablet Take 1 tablet by mouth Every Mon, Tues two times daily 16 tablet 3     Vitamin D, Cholecalciferol, 1000 units TABS Take 2 tablets (2,000 Units) by mouth daily 60 tablet 3   Lazaro confirms he took cytarabine at home Fri-Sun.  Also confirmed 6MP dose, no missed doses.    Received influenza vaccine for the 8336-0311 season.      Physical Exam:   Temp:  [98  F (36.7  C)-98.4  F (36.9  C)] 98.4  F (36.9  C)  Pulse:  [] 86  Resp:  [18-20] 18  BP: (116-120)/(70-71) 120/70  SpO2:  [98 %-100 %] 100 %      Wt Readings from Last 4 Encounters:   11/29/19 73.8 kg (162 lb 11.2 oz)   11/26/19 70.7 kg (155 lb 13.8 oz)   11/21/19 73.1 kg (161 lb 2.5 oz)   11/19/19 73.5 kg (162 lb 0.6 oz)     Ht Readings from Last 2 Encounters:  "  11/29/19 1.84 m (6' 0.44\")   11/26/19 1.838 m (6' 0.36\")     Weight at diagnosis was 75.2kg,   General: Lazaro is alert, interactive and appropriate. He appears well, is upbeat and in no obvious pain.  HEENT: Skull is atrauamatic and normocephalic.  The fullness in the right side of his face has improved, no venous distention.  Full head of hair. PERRLA, sclera are non icteric and not injected, EOM are intact, gaze slightly disconjugate which is his baseline. Nares are patent without drainage. Oropharynx clear, no plaques noted. Buccal mucosa and tongue clear. MMM. Tympanic membranes are opaque bilaterally with light reflex and landmarks present.  Voice no longer hoarse.  Lymph:  Neck is supple without lymphadenopathy.  There is no supraclavicular, axillary or inguinal lymphadenopathy palpated.  Cardiovascular:  HR is tachycardic, S1, S2 no murmur.  Capillary refill is < 2 seconds. Right arm without edema or erythema.  No pitting edema.  Cap refill < 2 sec. Peripheral pulses 2+/=. He has mildly distended veins noted on the left upper chest, not extending up to the neck, overlying the pectoralis.   Respiratory: No cough noted. Respirations are easy.  Lungs are clear to auscultation through out.  No crackles or wheezes.  Gastrointestinal:  BS present in all quadrants.  Abdomen is soft and flat.  Lazaro denies LUQ discomfort, no pain with palpation. No hepatosplenomegaly or masses are palpated.  Skin: He has dry  skin on his left arm (no redness) around the circumference of his bicep. No rashes, bruises or other skin lesions noted.  Neurological:  CN 2-12 grossly intact. Gait is normal.  No issues with balance. Sensation intact in hands and feet.     Musculoskeletal:  Good strength and ROM in all extremities.  Strong dorsiflexion at ankles and great toes (5/5) bilaterally without any pain at the Achilles.    Labs:   Results for orders placed or performed in visit on 11/29/19   CBC with platelets differential     " Status: Abnormal   Result Value Ref Range    WBC 2.6 (L) 4.0 - 11.0 10e9/L    RBC Count 2.09 (L) 4.4 - 5.9 10e12/L    Hemoglobin 6.2 (LL) 13.3 - 17.7 g/dL    Hematocrit 19.8 (L) 40.0 - 53.0 %    MCV 95 78 - 100 fl    MCH 29.7 26.5 - 33.0 pg    MCHC 31.3 (L) 31.5 - 36.5 g/dL    RDW 20.4 (H) 10.0 - 15.0 %    Platelet Count 141 (L) 150 - 450 10e9/L    Diff Method Automated Method     % Neutrophils 62.8 %    % Lymphocytes 26.2 %    % Monocytes 8.2 %    % Eosinophils 0.4 %    % Basophils 1.6 %    % Immature Granulocytes 0.8 %    Nucleated RBCs 1 (H) 0 /100    Absolute Neutrophil 1.6 1.6 - 8.3 10e9/L    Absolute Lymphocytes 0.7 (L) 0.8 - 5.3 10e9/L    Absolute Monocytes 0.2 0.0 - 1.3 10e9/L    Absolute Eosinophils 0.0 0.0 - 0.7 10e9/L    Absolute Basophils 0.0 0.0 - 0.2 10e9/L    Abs Immature Granulocytes 0.0 0 - 0.4 10e9/L    Absolute Nucleated RBC 0.0    Heparin 10a Level     Status: None   Result Value Ref Range    Heparin 10A Level 0.77 IU/mL       Assessment:  Lazaro Lund is a 20 year old young man with T cell lymphoblastic lymphoma (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  He is being treated per [facility] protocol [id number].   He's had a CRu, resolution of lymphadenopathy however a small pleural effusion persists. His course has been complicated by extensive bilateral DVT and most recently severe pancreatitis.  He is tolerating an oral diet and his pancreatitis related pain has improved.  He had a MRCP yesterday which showed necrotizing pancreatitis and possible discontinuity of the pancreatic duct.  Asparaginase will be permanently discontinued from his treatment regimen.  He is on lovenox for anticoagulation. 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 Recent Anti-Xa was therapeutic. He is on Vit D for deficiency.  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  He is tolerating chemotherapy well this past week.  Insomnia remains an issue despite amitriptyline. Is Day 36 of Consolidation today.   Contact dermatitis on left arm.    Plan:   1) Day 36 cytarabine today.  RN called [facility] to confirm delivery for Doses 37-39.  2) Reviewed labs with Lazaro, no transfusion needed today.   3) Reviewed at length with Lazaro the s/sx to monitor for and reasons to call.  If he is having any vomiting I've asked him to call.  4) Continue lovenox at current dose, recent level in goal range of 0.6-1.  Will check monthly.  Today 0.77.  He is aware of the need to hold PM prior and AM of his LPs. Will maintain platelet count > 30K while on anticoagulation.  Recent U/S stable, minor venous distention not surprising given the extent of his known thrombus, no other symptoms to suggest new acute thrombus. Plan to repeat U/S in 1-2 months.  5) Continue Vit D 3 2000IU daily, recheck level in 2 months.  6) Day 29 Induction LP deferred, original plan was to make up on Day 29 of Consolidation however given his recent complications I will defer this to Maintenance therapy.  7) Given recent pancreatitis and intermediate TPMT phenotype I reduced his 6MP for the 2nd half of Consolidation by 50%.  Reviewed dose with Lazaro.  Will obtain TPMT genotype once that testing becomes available again.   8) Given significant spinal headache history will plan for IV caffeine following LPs in the future.   9) Plan to repeat CT at the end of Consolidation to evaluate small residual pleural effusion.   10) Follow up with PACCT team if insomnia persists.  11) Continue protonix daily.  12) Recommend emollient (aquaphor or eucerin or Vanicream) mixed with 1% hydrocortisone BID to dermatitis on arm, will continue to follow.  13) RTC on Thursday 12/5 for vincristine and possible transfusion.      Mila Montague MSN, APRN, CPNP-AC, CPON  Department of Pediatrics  Division of Hematology/Oncology

## 2019-12-03 ENCOUNTER — PATIENT OUTREACH (OUTPATIENT)
Dept: GASTROENTEROLOGY | Facility: CLINIC | Age: 19
End: 2019-12-03

## 2019-12-03 DIAGNOSIS — K85.31 DRUG-INDUCED ACUTE PANCREATITIS WITH UNINFECTED NECROSIS: Primary | ICD-10-CM

## 2019-12-03 NOTE — PROGRESS NOTES
This is a recent snapshot of the patient's [facility] medical record.  For current drug dose and complete information and questions, call [phone] or In Basket pool, [facility] ([id number])  CSN Number:  [id number]

## 2019-12-03 NOTE — LETTER
December 3, 2019    Lazaro Lund                              [id number] [facility] [address]    Dear Lazaro,    This letter details follow up recommended by YOHAN Dunn CNP with Dr. Herb Coronel to monitor your pancreas.     We noted you are having a procedure on 12/19/19. Prior to your lumbar puncture, please report to imaging to have a CT completed. This imaging will be important to review in your clinic visit the following day.     Imaging- CT Abdomen  12/1/19 at 9:30, check in at 9:15 am  Location: [address] [place] [address] check in to [facility] on the first floor in the [facility] ([facility] Please do not eat or drink anything for 2 hours prior to this scan.     Clinic Visit  Clinic arranged on 12/2019, 8:00 am with Dr Herb Coronel. Clinic is located at the [facility], [address] [place] [address] ([facility]). Our clinic is on the 4th floor in [facility].     Please give us call with any questions or concerns.       Sincerely,    Meena Samaniego, SUJATHA Care Coordinator

## 2019-12-03 NOTE — TELEPHONE ENCOUNTER
Per Dr. Coronel      If we can start with a clinic visit that would be great; mid December is fine and what would be most helpful is an updated contrast CT of the abdomen just before; right now relatively small and would like the target bigger unless he is infected or very symptomatic     Patient having LP w/ sedation on 12/19.     Orders placed for imaging  12/19 at 9:30, 9:15 am  [address] [place] [address] check in to [facility] on the first floor in the [facility].     Clinic arranged on 12/20, 8:00 am with Dr Herb Coronel. Clinic is located at the [facility], [address] [place] [address]    Per patient request, sent follow up in letter and MyChart message.    Meena Samaniego RN Care Coordinator

## 2019-12-05 ENCOUNTER — INFUSION THERAPY VISIT (OUTPATIENT)
Dept: INFUSION THERAPY | Facility: CLINIC | Age: 19
End: 2019-12-05
Attending: NURSE PRACTITIONER
Payer: MEDICAID

## 2019-12-05 ENCOUNTER — OFFICE VISIT (OUTPATIENT)
Dept: PEDIATRIC HEMATOLOGY/ONCOLOGY | Facility: CLINIC | Age: 19
End: 2019-12-05

## 2019-12-05 ENCOUNTER — OFFICE VISIT (OUTPATIENT)
Dept: PEDIATRIC HEMATOLOGY/ONCOLOGY | Facility: CLINIC | Age: 19
End: 2019-12-05
Attending: NURSE PRACTITIONER
Payer: MEDICAID

## 2019-12-05 VITALS
SYSTOLIC BLOOD PRESSURE: 117 MMHG | HEIGHT: 72 IN | BODY MASS INDEX: 22.78 KG/M2 | DIASTOLIC BLOOD PRESSURE: 70 MMHG | WEIGHT: 168.21 LBS | HEART RATE: 105 BPM | OXYGEN SATURATION: 99 % | RESPIRATION RATE: 16 BRPM | TEMPERATURE: 98.1 F

## 2019-12-05 DIAGNOSIS — Z71.9 ENCOUNTER FOR COUNSELING: Primary | ICD-10-CM

## 2019-12-05 DIAGNOSIS — C83.50 T LYMPHOBLASTIC LYMPHOMA (H): Primary | ICD-10-CM

## 2019-12-05 LAB
ABO + RH BLD: NORMAL
ABO + RH BLD: NORMAL
BASOPHILS # BLD AUTO: 0 10E9/L (ref 0–0.2)
BASOPHILS NFR BLD AUTO: 0 %
BLD GP AB SCN SERPL QL: NORMAL
BLOOD BANK CMNT PATIENT-IMP: NORMAL
DIFFERENTIAL METHOD BLD: ABNORMAL
EOSINOPHIL # BLD AUTO: 0.1 10E9/L (ref 0–0.7)
EOSINOPHIL NFR BLD AUTO: 2.4 %
ERYTHROCYTE [DISTWIDTH] IN BLOOD BY AUTOMATED COUNT: 17.2 % (ref 10–15)
HCT VFR BLD AUTO: 22.6 % (ref 40–53)
HGB BLD-MCNC: 7.4 G/DL (ref 13.3–17.7)
IMM GRANULOCYTES # BLD: 0 10E9/L (ref 0–0.4)
IMM GRANULOCYTES NFR BLD: 0.4 %
LYMPHOCYTES # BLD AUTO: 0.7 10E9/L (ref 0.8–5.3)
LYMPHOCYTES NFR BLD AUTO: 26.8 %
MCH RBC QN AUTO: 30.3 PG (ref 26.5–33)
MCHC RBC AUTO-ENTMCNC: 32.7 G/DL (ref 31.5–36.5)
MCV RBC AUTO: 93 FL (ref 78–100)
MONOCYTES # BLD AUTO: 0.1 10E9/L (ref 0–1.3)
MONOCYTES NFR BLD AUTO: 3.2 %
NEUTROPHILS # BLD AUTO: 1.7 10E9/L (ref 1.6–8.3)
NEUTROPHILS NFR BLD AUTO: 67.2 %
NRBC # BLD AUTO: 0 10*3/UL
NRBC BLD AUTO-RTO: 0 /100
PLATELET # BLD AUTO: 72 10E9/L (ref 150–450)
RBC # BLD AUTO: 2.44 10E12/L (ref 4.4–5.9)
SPECIMEN EXP DATE BLD: NORMAL
WBC # BLD AUTO: 2.5 10E9/L (ref 4–11)

## 2019-12-05 PROCEDURE — 86850 RBC ANTIBODY SCREEN: CPT | Performed by: NURSE PRACTITIONER

## 2019-12-05 PROCEDURE — 86901 BLOOD TYPING SEROLOGIC RH(D): CPT | Performed by: NURSE PRACTITIONER

## 2019-12-05 PROCEDURE — 86900 BLOOD TYPING SEROLOGIC ABO: CPT | Performed by: NURSE PRACTITIONER

## 2019-12-05 PROCEDURE — 96409 CHEMO IV PUSH SNGL DRUG: CPT

## 2019-12-05 PROCEDURE — 25000128 H RX IP 250 OP 636: Mod: ZF | Performed by: PEDIATRICS

## 2019-12-05 PROCEDURE — 85025 COMPLETE CBC W/AUTO DIFF WBC: CPT | Performed by: PEDIATRICS

## 2019-12-05 PROCEDURE — 25000128 H RX IP 250 OP 636: Mod: ZF

## 2019-12-05 PROCEDURE — 25800030 ZZH RX IP 258 OP 636: Mod: ZF | Performed by: PEDIATRICS

## 2019-12-05 PROCEDURE — 96375 TX/PRO/DX INJ NEW DRUG ADDON: CPT

## 2019-12-05 RX ORDER — ONDANSETRON 2 MG/ML
INJECTION INTRAMUSCULAR; INTRAVENOUS
Status: COMPLETED
Start: 2019-12-05 | End: 2019-12-05

## 2019-12-05 RX ORDER — HEPARIN SODIUM (PORCINE) LOCK FLUSH IV SOLN 100 UNIT/ML 100 UNIT/ML
SOLUTION INTRAVENOUS
Status: COMPLETED
Start: 2019-12-05 | End: 2019-12-05

## 2019-12-05 RX ORDER — ONDANSETRON 2 MG/ML
8 INJECTION INTRAMUSCULAR; INTRAVENOUS ONCE
Status: COMPLETED | OUTPATIENT
Start: 2019-12-05 | End: 2019-12-05

## 2019-12-05 RX ADMIN — ONDANSETRON 8 MG: 2 INJECTION INTRAMUSCULAR; INTRAVENOUS at 13:45

## 2019-12-05 RX ADMIN — HEPARIN: 100 SYRINGE at 15:05

## 2019-12-05 RX ADMIN — VINCRISTINE SULFATE 2 MG: 1 INJECTION, SOLUTION INTRAVENOUS at 14:49

## 2019-12-05 ASSESSMENT — MIFFLIN-ST. JEOR: SCORE: 1809.25

## 2019-12-05 NOTE — LETTER
12/5/2019      RE: Lazaro Lund  [address] [facility] [address]       [facility]  PEDIATRIC HEMATOLOGY/ONCOLOGY   SOCIAL WORK PROGRESS NOTE      DATA:     Lazaro is a 19 year old male with T-Cell Lymphoblastic Lymphoma receiving treatment per [id number]. He presents to clinic on this date for Day 43 of consolidation. SW met supportively with Lazaro and his paternal grandmother during his infusion. Lazaro reports that he is doing well. He continues to have issues with getting his MA to backdate and has not submitted the correct proofs as of yet. He did e-mail writer bank statements which writer forwarded to Eliza HARRY.  and [facility] still need his pay stubs from June to present. He has outstanding medical bills that are pending billing to MA. SW asked him to directly contact Eliza HARRY and gave the correct address. He is still waiting to hear from social security regarding a determination. No new concerns today.     INTERVENTION:     1. Supportive counseling. Check-in.  2. Follow-up re: MA proofs needed for MA to backdate.     ASSESSMENT:     Lazaro reports feeling well overall. He is spending time with friends as he feels well. He is excited about his new iPhone and is enjoying learning the new features. Grandmother is supportive. He has a strong familial and social support system. He is open to and appreciative of ongoing therapeutic support, advocacy, and connection with resources.     PLAN:     Social work will continue to assess needs and provide ongoing psychosocial support and access to resources.      CHEY Davis, LICSW, OSW-C  Clinical    Pediatric Hematology Oncology   [facility]   Monday-Thursday   Phone: [phone]  Pager: [phone]    NO LETTER                CHEY Alcantara

## 2019-12-05 NOTE — PROGRESS NOTES
Lazaro came to [facility] for Vincristine. Port accessed by SUJATHA Montiel. Labs drawn. IV zofran given by SUJATHA Clarke . Vincristine given by gravity. Patient tolerated well. Blood return noted pre/mid/post infusion.  Port heparin locked.  Patient left in stable condition at completion of cares.

## 2019-12-05 NOTE — PROGRESS NOTES
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 Lazaro presented with acute onset cough and was found to have an anterior mediastinal mass and malignant left-sided pleural effusion.  A CT guided biopsy was obtained at [facility] and pathology was consistent with T-cell leukemia vs lymphoma.  He was admitted to [facility] oncology service 8/30 and started on treatment per [id number].  He had a pleural effusion that required a chest tube and a pericardial effusion that was not drained. His Induction was complicated by cardiac compression secondary to his mass leading to tamponade, this improved through out his hospital stay.  He also had dysphagia that improved with treatment of his mass, swallow study was normal. His course was recently complicated by extensive bilateral UE DVTs, he remains on anticoagulation.  Most recently his course was complicated by the development of severe asparaginase induced pancreatitis. He became quite ill with SIRS and associated hypotension, he required pressor support in the ICU.  Lazaro also had severe pain that was initially managed with a PCA and transitioned to oral prior to discharge.  He comes to clinic today for labs and exam, he is Day 43 of Consolidation.    HPI:   Lazaro comes to clinic today with his grandma.  He states he's been feeling pretty well this past week.  He had one episode of vomiting that came out of nowhere earlier today.  He was able to eat breakfast after that and no issues since.  He otherwise has not had nausea or GI upset.  Lazaro reports the pain secondary to pancreatitis is stable.  He is not taking any pain medications.  He is sleeping better at night.  Energy level stable.  Dermatitis on his arm has improved, no new skin concerns.  He has not had fever. He denies constipation, has not needed laxatives. Denies any motor or sensory changes in his hands/feet.      Review of  systems:  Remainder of ROS is complete and negative.    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  He was working full time at [employer] in [place] prior to his diagnosis.     Current Medications:  Current Outpatient Medications on File Prior to Visit   Medication Sig Dispense Refill     acetaminophen (TYLENOL) 325 MG tablet Take 2 tablets (650 mg) by mouth every 6 hours as needed for mild pain 60 tablet 0     amitriptyline (ELAVIL) 10 MG tablet Take 1 tablet (10 mg) by mouth At Bedtime 30 tablet 1     cytarabine, PF, (CYTOSAR) 100 MG/ML injection Inject 1.5 mLs (150 mg) Subcutaneous daily for 3 days Start day after clinic dose. 4.5 mL 0     cytarabine, PF, (CYTOSAR) 100 MG/ML injection Inject 1.5 mLs (150 mg) Subcutaneous daily for 3 days Start day after clinic dose. 4.5 mL 0     diphenhydrAMINE (BENADRYL) 25 MG capsule Take 1-2 capsules (25-50 mg) by mouth every 6 hours as needed (Breakthrough Nausea and Vomiting ) 30 capsule 1     enoxaparin ANTICOAGULANT (LOVENOX) 100 MG/ML syringe Inject 1 mL (100 mg) Subcutaneous every 12 hours 180 mL 0     melatonin 3 MG tablet Take 1 tablet (3 mg) by mouth At Bedtime 30 tablet 1     mercaptopurine (PURINETHOL) 50 MG tablet Take 1 tablet (50mg) five days/week and 1.5  "tablets (75mg) two days/week. Take on Days 29-42. 16 tablet 0     ondansetron (ZOFRAN-ODT) 8 MG ODT tab Take 1 tablet (8 mg) by mouth every 6 hours as needed for nausea or vomiting 20 tablet 0     oxyCODONE (ROXICODONE) 10 MG tablet Take 1 tablet (10 mg) by mouth every 4 hours as needed for severe pain 42 tablet 0     pantoprazole (PROTONIX) 40 MG EC tablet Take 1 tablet (40 mg) by mouth every morning (before breakfast) 30 tablet 0     polyethylene glycol (MIRALAX/GLYCOLAX) packet Take 17 g by mouth daily 30 packet 0     scopolamine (TRANSDERM) 1 MG/3DAYS 72 hr patch Place 1 patch onto the skin every 72 hours 10 patch 3     sennosides (SENOKOT) 8.6 MG tablet Take 2 tablets by mouth 2 times daily as needed for constipation 60 each 1     sulfamethoxazole-trimethoprim (BACTRIM DS/SEPTRA DS) 800-160 MG tablet Take 1 tablet by mouth Every Mon, Tues two times daily 16 tablet 3     Vitamin D, Cholecalciferol, 1000 units TABS Take 2 tablets (2,000 Units) by mouth daily 60 tablet 3   Lazaro confirms he took cytarabine at home Sat-Mon.  Also confirmed 6MP dose, no missed doses, has completed all of his doses.    Received influenza vaccine for the 2160-1433 season.      Physical Exam:   Temp:  [98.1  F (36.7  C)] 98.1  F (36.7  C)  Pulse:  [105] 105  Resp:  [16] 16  BP: (117)/(70) 117/70  SpO2:  [99 %] 99 %      Wt Readings from Last 4 Encounters:   12/05/19 76.3 kg (168 lb 3.4 oz)   11/29/19 73.8 kg (162 lb 11.2 oz)   11/26/19 70.7 kg (155 lb 13.8 oz)   11/21/19 73.1 kg (161 lb 2.5 oz)     Ht Readings from Last 2 Encounters:   12/05/19 1.834 m (6' 0.21\")   11/29/19 1.84 m (6' 0.44\")     Weight at diagnosis was 75.2kg,   General: Lazaro is alert, interactive and appropriate. He appears well, is upbeat and in no obvious pain.  HEENT: Skull is atrauamatic and normocephalic.  Full head of hair. PERRLA, sclera are non icteric and not injected, EOM are intact, gaze slightly disconjugate which is his baseline. Nares are patent " without drainage. Oropharynx clear, no plaques noted. Buccal mucosa and tongue clear. MMM. Tympanic membranes are opaque bilaterally with light reflex and landmarks present.  Voice no longer hoarse.  Lymph:  Neck is supple without lymphadenopathy.  There is no supraclavicular, axillary or inguinal lymphadenopathy palpated.  Cardiovascular:  HR is mildly tachycardic, S1, S2 no murmur.  Capillary refill is < 2 seconds. Right arm without edema or erythema.  No pitting edema.  Cap refill < 2 sec. Peripheral pulses 2+/=. He has mildly distended veins noted on the left upper chest, not extending up to the neck, overlying the pectoralis.   Respiratory: No cough noted. Respirations are easy.  Lungs are clear to auscultation through out.  No crackles or wheezes.  Gastrointestinal:  BS present in all quadrants.  Abdomen is soft and flat.  Lazaro denies LUQ discomfort, no pain with palpation. No hepatosplenomegaly or masses are palpated.  Skin: Faint dry skin remains around the circumference of his bicep. No rashes, bruises or other skin lesions noted.  Neurological:  CN 2-12 grossly intact. Gait is normal.  No issues with balance. Sensation intact in hands and feet.     Musculoskeletal:  Good strength and ROM in all extremities.  Strong dorsiflexion at ankles and great toes (5/5) bilaterally without any pain at the Achilles.    Labs:   Results for orders placed or performed in visit on 12/05/19   CBC with platelets differential     Status: Abnormal   Result Value Ref Range    WBC 2.5 (L) 4.0 - 11.0 10e9/L    RBC Count 2.44 (L) 4.4 - 5.9 10e12/L    Hemoglobin 7.4 (L) 13.3 - 17.7 g/dL    Hematocrit 22.6 (L) 40.0 - 53.0 %    MCV 93 78 - 100 fl    MCH 30.3 26.5 - 33.0 pg    MCHC 32.7 31.5 - 36.5 g/dL    RDW 17.2 (H) 10.0 - 15.0 %    Platelet Count 72 (L) 150 - 450 10e9/L    Diff Method Automated Method     % Neutrophils 67.2 %    % Lymphocytes 26.8 %    % Monocytes 3.2 %    % Eosinophils 2.4 %    % Basophils 0.0 %    % Immature  Granulocytes 0.4 %    Nucleated RBCs 0 0 /100    Absolute Neutrophil 1.7 1.6 - 8.3 10e9/L    Absolute Lymphocytes 0.7 (L) 0.8 - 5.3 10e9/L    Absolute Monocytes 0.1 0.0 - 1.3 10e9/L    Absolute Eosinophils 0.1 0.0 - 0.7 10e9/L    Absolute Basophils 0.0 0.0 - 0.2 10e9/L    Abs Immature Granulocytes 0.0 0 - 0.4 10e9/L    Absolute Nucleated RBC 0.0    ABO/Rh type and screen     Status: None   Result Value Ref Range    ABO O     RH(D) Pos     Antibody Screen Neg     Test Valid Only At          [facility]    Specimen Expires 12/08/2019      Assessment:  Lazaro Lund is a 19 year old young man with T cell lymphoblastic lymphoma (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  He is being treated per [facility] protocol [id number].   He's had a CRu, resolution of lymphadenopathy however a small pleural effusion persists. His course has been complicated by extensive bilateral DVT and most recently severe pancreatitis.  He is tolerating an oral diet and his pancreatitis related pain is stable, he is not requiring any pain medications. Recent MRCP showed necrotizing pancreatitis and possible discontinuity of the pancreatic duct.  Asparaginase will be permanently discontinued from his treatment regimen.  He is on lovenox for anticoagulation. 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 Recent Anti-Xa was therapeutic. He is on Vit D for deficiency.  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  He is tolerating chemotherapy well. Is Day 43 of Consolidation today.  He is anemic but not symptomatic.    Plan:   1) Previously discussed MRCP findings with Dr Coronel.  He is comfortable proceeding with close observation at this time unless Lazaro would develop fever, abdominal pain or pain after eating.  Lazaro will see Dr Coronel for follow up mid-December, he will have a CT to monitor fluid collection at that time.  If intervention is needed Dr Coronel plans to do an endoscopic  approach.  Discussed this information with Lazaro today.  Stressed the importance of reporting any change in his abdominal symptoms.  2) Reviewed labs with Lazaro, given he is asymptomatic will hold off on transfusion at this time.  Lazaro will call if he develops any symptoms.   3) Reviewed at length with Lazaro the s/sx to monitor for and reasons to call.  If he is having any vomiting I've asked him to call.  4) Continue lovenox at current dose, recent level in goal range of 0.6-1.  Will check monthly (due early January).  He is aware of the need to hold PM prior and AM of his LPs. Will maintain platelet count > 30K while on anticoagulation.  Reviewed s/sx of thrombocytopenia with him today.  Recent U/S stable, minor venous distention not surprising given the extent of his known thrombus, no other symptoms to suggest new acute thrombus. Plan to repeat U/S in 1-2 months.  5) Continue Vit D 3 2000IU daily, recheck level in 2 months.  6) Day 29 Induction LP deferred, original plan was to make up on Day 29 of Consolidation however given his recent complications I will defer this to Maintenance therapy.  7) Given recent pancreatitis and intermediate TPMT phenotype his 6MP dose was reduced for the 2nd half of Consolidation by 50%.  Will obtain TPMT genotype once that testing becomes available again.   8) Given significant spinal headache history will plan for IV caffeine following LPs in the future.   9) Plan to repeat CT at the end of Consolidation to evaluate small residual pleural effusion.   10) Continue protonix daily.  11) RTC in 1 week for Day 50 therapy, sooner with concerns.

## 2019-12-05 NOTE — LETTER
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 Lazaro presented with acute onset cough and was found to have an anterior mediastinal mass and malignant left-sided pleural effusion.  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  He was admitted to [facility] oncology service 8/30 and started on treatment per [id number].  He had a pleural effusion that required a chest tube and a pericardial effusion that was not drained. His Induction was complicated by cardiac compression secondary to his mass leading to tamponade, this improved through out his hospital stay.  He also had dysphagia that improved with treatment of his mass, swallow study was normal. His course was recently complicated by extensive bilateral UE DVTs, he remains on anticoagulation.  Most recently his course was complicated by the development of severe asparaginase induced pancreatitis. He became quite ill with SIRS and associated hypotension, he required pressor support in the ICU.  Lazaro also had severe pain that was initially managed with a PCA and transitioned to oral prior to discharge.  He comes to clinic today for labs and exam, he is Day 43 of Consolidation.    HPI:   Lazaro comes to clinic today with his grandma.  He states he's been feeling pretty well this past week.  He had one episode of vomiting that came out of nowhere earlier today.  He was able to eat breakfast after that and no issues since.  He otherwise has not had nausea or GI upset.  Lazaro reports the pain secondary to pancreatitis is stable.  He is not taking any pain medications.  He is sleeping better at night.  Energy level stable.  Dermatitis on his arm has improved, no new skin concerns.  He has not had fever. He denies constipation, has not needed laxatives.  Denies any motor or sensory changes in his hands/feet.      Review of systems:  Remainder of ROS is complete and negative.    PMH:   Past Medical History:   Diagnosis Date     DVT of upper extremity (deep vein thrombosis) (H) 09/26/2019    Bilateral      Edema of upper extremity 10/17/2019     Folliculitis 10/17/2019     Migraine 2006    have resolved     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   Spinal HA following LP  TPMT shows Intermediate activity  Factor V leiden and prothrombin negative  Pancreatitis secondary to asparaginase    PFMH:   Family History   Problem Relation Age of Onset     No Known Problems Mother      No Known Problems Father      Asthma Brother      Thyroid Cancer Paternal Grandmother      Melanoma Paternal Aunt        Social History: Lazaro previously lived at home with his dad and step mom in [place] but is now staying with his mom in [place].  He was working full time at [employer] in [place] prior to his diagnosis.     Current Medications:  Current Outpatient Medications on File Prior to Visit   Medication Sig Dispense Refill     acetaminophen (TYLENOL) 325 MG tablet Take 2 tablets (650 mg) by mouth every 6 hours as needed for mild pain 60 tablet 0     amitriptyline (ELAVIL) 10 MG tablet Take 1 tablet (10 mg) by mouth At Bedtime 30 tablet 1     cytarabine, PF, (CYTOSAR) 100 MG/ML injection Inject 1.5 mLs (150 mg) Subcutaneous daily for 3 days Start day after clinic dose. 4.5 mL 0     cytarabine, PF, (CYTOSAR) 100 MG/ML injection Inject 1.5 mLs (150 mg) Subcutaneous daily for 3 days Start day after clinic dose. 4.5 mL 0     diphenhydrAMINE (BENADRYL) 25 MG capsule Take 1-2 capsules (25-50 mg) by mouth every 6 hours as needed (Breakthrough Nausea and Vomiting ) 30 capsule 1     enoxaparin ANTICOAGULANT (LOVENOX) 100 MG/ML syringe Inject 1 mL (100 mg) Subcutaneous every 12 hours 180 mL 0     melatonin 3 MG tablet Take 1 tablet (3 mg) by mouth At Bedtime 30 tablet 1     mercaptopurine  "(PURINETHOL) 50 MG tablet Take 1 tablet (50mg) five days/week and 1.5 tablets (75mg) two days/week. Take on Days 29-42. 16 tablet 0     ondansetron (ZOFRAN-ODT) 8 MG ODT tab Take 1 tablet (8 mg) by mouth every 6 hours as needed for nausea or vomiting 20 tablet 0     oxyCODONE (ROXICODONE) 10 MG tablet Take 1 tablet (10 mg) by mouth every 4 hours as needed for severe pain 42 tablet 0     pantoprazole (PROTONIX) 40 MG EC tablet Take 1 tablet (40 mg) by mouth every morning (before breakfast) 30 tablet 0     polyethylene glycol (MIRALAX/GLYCOLAX) packet Take 17 g by mouth daily 30 packet 0     scopolamine (TRANSDERM) 1 MG/3DAYS 72 hr patch Place 1 patch onto the skin every 72 hours 10 patch 3     sennosides (SENOKOT) 8.6 MG tablet Take 2 tablets by mouth 2 times daily as needed for constipation 60 each 1     sulfamethoxazole-trimethoprim (BACTRIM DS/SEPTRA DS) 800-160 MG tablet Take 1 tablet by mouth Every Mon, Tues two times daily 16 tablet 3     Vitamin D, Cholecalciferol, 1000 units TABS Take 2 tablets (2,000 Units) by mouth daily 60 tablet 3   Lazaro confirms he took cytarabine at home Sat-Mon.  Also confirmed 6MP dose, no missed doses, has completed all of his doses.    Received influenza vaccine for the 0708-1814 season.      Physical Exam:   Temp:  [98.1  F (36.7  C)] 98.1  F (36.7  C)  Pulse:  [105] 105  Resp:  [16] 16  BP: (117)/(70) 117/70  SpO2:  [99 %] 99 %      Wt Readings from Last 4 Encounters:   12/05/19 76.3 kg (168 lb 3.4 oz)   11/29/19 73.8 kg (162 lb 11.2 oz)   11/26/19 70.7 kg (155 lb 13.8 oz)   11/21/19 73.1 kg (161 lb 2.5 oz)     Ht Readings from Last 2 Encounters:   12/05/19 1.834 m (6' 0.21\")   11/29/19 1.84 m (6' 0.44\")     Weight at diagnosis was 75.2kg,   General: Lazaro is alert, interactive and appropriate. He appears well, is upbeat and in no obvious pain.  HEENT: Skull is atrauamatic and normocephalic.  Full head of hair. PERRLA, sclera are non icteric and not injected, EOM are intact, " gaze slightly disconjugate which is his baseline. Nares are patent without drainage. Oropharynx clear, no plaques noted. Buccal mucosa and tongue clear. MMM. Tympanic membranes are opaque bilaterally with light reflex and landmarks present.  Voice no longer hoarse.  Lymph:  Neck is supple without lymphadenopathy.  There is no supraclavicular, axillary or inguinal lymphadenopathy palpated.  Cardiovascular:  HR is mildly tachycardic, S1, S2 no murmur.  Capillary refill is < 2 seconds. Right arm without edema or erythema.  No pitting edema.  Cap refill < 2 sec. Peripheral pulses 2+/=. He has mildly distended veins noted on the left upper chest, not extending up to the neck, overlying the pectoralis.   Respiratory: No cough noted. Respirations are easy.  Lungs are clear to auscultation through out.  No crackles or wheezes.  Gastrointestinal:  BS present in all quadrants.  Abdomen is soft and flat.  Lazaro denies LUQ discomfort, no pain with palpation. No hepatosplenomegaly or masses are palpated.  Skin: Faint dry skin remains around the circumference of his bicep. No rashes, bruises or other skin lesions noted.  Neurological:  CN 2-12 grossly intact. Gait is normal.  No issues with balance. Sensation intact in hands and feet.     Musculoskeletal:  Good strength and ROM in all extremities.  Strong dorsiflexion at ankles and great toes (5/5) bilaterally without any pain at the Achilles.    Labs:   Results for orders placed or performed in visit on 12/05/19   CBC with platelets differential     Status: Abnormal   Result Value Ref Range    WBC 2.5 (L) 4.0 - 11.0 10e9/L    RBC Count 2.44 (L) 4.4 - 5.9 10e12/L    Hemoglobin 7.4 (L) 13.3 - 17.7 g/dL    Hematocrit 22.6 (L) 40.0 - 53.0 %    MCV 93 78 - 100 fl    MCH 30.3 26.5 - 33.0 pg    MCHC 32.7 31.5 - 36.5 g/dL    RDW 17.2 (H) 10.0 - 15.0 %    Platelet Count 72 (L) 150 - 450 10e9/L    Diff Method Automated Method     % Neutrophils 67.2 %    % Lymphocytes 26.8 %    %  Monocytes 3.2 %    % Eosinophils 2.4 %    % Basophils 0.0 %    % Immature Granulocytes 0.4 %    Nucleated RBCs 0 0 /100    Absolute Neutrophil 1.7 1.6 - 8.3 10e9/L    Absolute Lymphocytes 0.7 (L) 0.8 - 5.3 10e9/L    Absolute Monocytes 0.1 0.0 - 1.3 10e9/L    Absolute Eosinophils 0.1 0.0 - 0.7 10e9/L    Absolute Basophils 0.0 0.0 - 0.2 10e9/L    Abs Immature Granulocytes 0.0 0 - 0.4 10e9/L    Absolute Nucleated RBC 0.0    ABO/Rh type and screen     Status: None   Result Value Ref Range    ABO O     RH(D) Pos     Antibody Screen Neg     Test Valid Only At          [facility]    Specimen Expires 12/08/2019      Assessment:  Lazaro Lund is a 19 year old young man with T cell lymphoblastic lymphoma (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  He is being treated per [facility] protocol [id number].   He's had a CRu, resolution of lymphadenopathy however a small pleural effusion persists. His course has been complicated by extensive bilateral DVT and most recently severe pancreatitis.  He is tolerating an oral diet and his pancreatitis related pain is stable, he is not requiring any pain medications. Recent MRCP showed necrotizing pancreatitis and possible discontinuity of the pancreatic duct.  Asparaginase will be permanently discontinued from his treatment regimen.  He is on lovenox for anticoagulation. 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 Recent Anti-Xa was therapeutic. He is on Vit D for deficiency.  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  He is tolerating chemotherapy well. Is Day 43 of Consolidation today.  He is anemic but not symptomatic.    Plan:   1) Previously discussed MRCP findings with Dr Coronel.  He is comfortable proceeding with close observation at this time unless Lazaro would develop fever, abdominal pain or pain after eating.  Lazaro will see Dr Coronel for follow up mid-December, he will have a CT to monitor fluid collection at  that time.  If intervention is needed Dr Coronel plans to do an endoscopic approach.  Discussed this information with Lazaro today.  Stressed the importance of reporting any change in his abdominal symptoms.  2) Reviewed labs with Lazaro, given he is asymptomatic will hold off on transfusion at this time.  Lazaro will call if he develops any symptoms.   3) Reviewed at length with Lazaro the s/sx to monitor for and reasons to call.  If he is having any vomiting I've asked him to call.  4) Continue lovenox at current dose, recent level in goal range of 0.6-1.  Will check monthly (due early January).  He is aware of the need to hold PM prior and AM of his LPs. Will maintain platelet count > 30K while on anticoagulation.  Reviewed s/sx of thrombocytopenia with him today.  Recent U/S stable, minor venous distention not surprising given the extent of his known thrombus, no other symptoms to suggest new acute thrombus. Plan to repeat U/S in 1-2 months.  5) Continue Vit D 3 2000IU daily, recheck level in 2 months.  6) Day 29 Induction LP deferred, original plan was to make up on Day 29 of Consolidation however given his recent complications I will defer this to Maintenance therapy.  7) Given recent pancreatitis and intermediate TPMT phenotype his 6MP dose was reduced for the 2nd half of Consolidation by 50%.  Will obtain TPMT genotype once that testing becomes available again.   8) Given significant spinal headache history will plan for IV caffeine following LPs in the future.   9) Plan to repeat CT at the end of Consolidation to evaluate small residual pleural effusion.   10) Continue protonix daily.  11) RTC in 1 week for Day 50 therapy, sooner with concerns.       Félix Van, YOHAN CNP

## 2019-12-10 NOTE — PROGRESS NOTES
[facility]  PEDIATRIC HEMATOLOGY/ONCOLOGY   SOCIAL WORK PROGRESS NOTE      DATA:     Lazaro is a 19 year old male with T-Cell Lymphoblastic Lymphoma receiving treatment per [id number]. He presents to clinic on this date for Day 43 of consolidation. HONEY met supportively with Lazaro and his paternal grandmother during his infusion. Lazaro reports that he is doing well. He continues to have issues with getting his MA to backdate and has not submitted the correct proofs as of yet. He did e-mail writer bank statements which writer forwarded to Eliza HARRY.  and [facility] still need his pay stubs from June to present. He has outstanding medical bills that are pending billing to MA. SW asked him to directly contact Eliza HARRY and gave the correct address. He is still waiting to hear from social security regarding a determination. No new concerns today.     INTERVENTION:     1. Supportive counseling. Check-in.  2. Follow-up re: MA proofs needed for MA to backdate.     ASSESSMENT:     Lazaro reports feeling well overall. He is spending time with friends as he feels well. He is excited about his new iPhone and is enjoying learning the new features. Grandmother is supportive. He has a strong familial and social support system. He is open to and appreciative of ongoing therapeutic support, advocacy, and connection with resources.     PLAN:     Social work will continue to assess needs and provide ongoing psychosocial support and access to resources.      CHEY Davis, LICSW, OSW-C  Clinical    Pediatric Hematology Oncology   [facility]   Monday-Thursday   Phone: [phone]  Pager: [phone]    NO LETTER

## 2019-12-10 NOTE — PROGRESS NOTES
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 He is currently day 50 of consolidation. With induction, he had a CRu, resolution of lymphadenopathy, but persistence of small pleural effusion. Induction was complicated by development of extensive upper DVT complicated by edema and folliculitis preventing day 29 LP, and consolidation was complicated by severe PEG-asparaginase induced necrotizing pancreatitis, being monitored by Dr. Montgomery. As a result, PEG-asparaginase has been discontinued from his treatment regimen. 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     Patient Active Problem List   Diagnosis     T lymphoblastic lymphoma (H)     DVT (deep venous thrombosis) (H)     Cushingoid side effect of steroids (H)     Anticoagulated     Vitamin D deficiency     Port-A-Cath in place     Immunosuppressed due to chemotherapy     Acute pancreatitis due to PEGaspariginase therapy      Neuropathic pain     Insomnia due to medical condition     Plan  1. Follow up abdominal CT on 12/19/19 with ongoing follow-up with Dr. Coronel. Will also repeat chest CT at this time to evaluate residual pleural effusion.   2. Continue lovenox at current dose. Check Xa level monthly (due early January). He is aware of the need to hold PM prior and AM of his LPs. Will maintain platelet count > 30K while on anticoagulation. Plan to repeat US in January.  3. Continue vitamin D, level repeated today  4. Day 29 induction LP that was deferred will be made up during maintenance therapy  5. Given recent pancreatitis and intermediate TPMT phenotype his 6MP dose was reduced for the 2nd half of Consolidation by 50%.  Will obtain TPMT genotype once that testing becomes available again.   6. Given significant spinal headache history will plan for IV caffeine following LPs in the  future.   7. Consolidation Day 50 vincristine along with PRBC transfusion for chemotherapy associated anemia (hgb 6.2) today  8. RTC on 12/19/19 for scans and to start interim maintenance with IT methotrexate, VCR, and capizzi IV methotrexate (PEGasp omitted as described) pending counts.   9. Will plan for repeat ECHO towards end of IM in preparation for DI which contains additional anthracycline      Signed,  Nicho Fischer   Pediatric Hematology/Oncology Fellow    Physician Attestation    I saw and evaluated the patient. I discussed with the fellow and agree with the findings and plan as documented in the fellow's note.     Reynaldo Campuzano MD  Pediatric Hematology/Oncology  [facility]    Primary Care Physician   Rodriguez Montoya      Oncology History  Lazaro Lund is a 19 year old young man with T-cell lymphoblastic lymphoma, pratt stage III. Lazaro presented with chronic cough with progressive orthopnea and was found to have an anterior mediastinal mass and malignant left-sided pleural effusion.  A CT guided biopsy was obtained at [facility] and pathology was consistent with T-cell neoplasm.  He was admitted to [facility] oncology service 8/30 and started on treatment per [id number].  He had a pleural effusion that required a chest tube and a pericardial effusion that was not drained. His Induction was complicated by cardiac compression secondary to his mass leading to tamponade, this improved through out his hospital stay.  He also had dysphagia that improved with treatment of his mass, swallow study was normal. His induction course was complicated by extensive bilateral UE DVTs for which anticoagulation was started. On 10/24, he was able to have a port-a-cath placed in his upper extremity. At end of induction, he had a CRu response (persistent effusion on imaging). Consolidation was complicated by the development of severe asparaginase induced pancreatitis. He became quite  ill with SIRS and associated hypotension, he required pressor support in the ICU.  Lazaro also had severe pain that was initially managed with a PCA and transitioned to oral prior to discharge. Due to this, asparaginase was omitted from his treatment plan.      Interim History  Lazaro has had a good week. His abdominal pain finally fully resolved as of two days ago. He continues to have no issues with eating and is drinking adequately. No fevers at home, no vomiting, no headaches. He does have lower energy and became winded walking from the parking lot. He is sure he needs a blood transfusion today. He has no issues taking his lovenox - no bleeding side effects aside from injection site bruising.     Past Medical History    I have reviewed this patient's medical history and updated it with pertinent information if needed.   Past Medical History:   Diagnosis Date     Acute necrotizing pancreatitis 11/07/2019    attributed to asparaginase     DVT of upper extremity (deep vein thrombosis) (H) 09/26/2019    Bilateral      Edema of upper extremity 10/17/2019     Folliculitis 10/17/2019     Migraine 2006    have resolved     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       Past Surgical History   I have reviewed this patient's surgical history and updated it with pertinent information if needed.  Past Surgical History:   Procedure Laterality Date     BONE MARROW BIOPSY, BONE SPECIMEN, NEEDLE/TROCAR Left 9/1/2019    Procedure: BIOPSY, BONE MARROW;  Surgeon: Heather Lopez MD;  Location: [facility] OR     INSERT PICC LINE N/A 8/31/2019    Procedure: INSERTION, PICC;  Surgeon: Michell Keith MD;  Location: [facility] OR     INSERT PORT VASCULAR ACCESS N/A 10/24/2019    Procedure: INSERTION, VASCULAR ACCESS PORT;  Surgeon: Silviano Martins MD;  Location: [facility] PEDS SEDATION      IR CHEST PORT PLACEMENT > 5 YRS OF AGE  10/24/2019     IR CHEST TUBE PLACEMENT NON-TUNNELLED LEFT  8/31/2019     IR PICC PLACEMENT > 5 YRS OF AGE  8/31/2019      IR PORT CHECK RIGHT  11/12/2019     SPINAL PUNCTURE,LUMBAR, INTRATHECAL CHEMO DELIVERY N/A 8/31/2019    Procedure: LUMBAR PUNCTURE, WITH INTRATHECAL CHEMOTHERAPY ADMINISTRATION;  Surgeon: Heather Lopez MD;  [facility]: [facility] OR     SPINAL PUNCTURE,LUMBAR, INTRATHECAL CHEMO DELIVERY N/A 9/9/2019    Procedure: Lumbar Puncture With Intrathecal Chemo;  Surgeon: Alexi Hayes MD;  [facility]     SPINAL PUNCTURE,LUMBAR, INTRATHECAL CHEMO DELIVERY N/A 10/10/2019    Procedure: Lumbar puncture with IT Chemo (CD);  Surgeon: Reynaldo Campuzano MD;  [facility]      SPINAL PUNCTURE,LUMBAR, INTRATHECAL CHEMO DELIVERY N/A 10/17/2019    Procedure: Lumbar puncture with IT Chemo (not CD);  Surgeon: Reynaldo Campuzano MD;  [facility]      SPINAL PUNCTURE,LUMBAR, INTRATHECAL CHEMO DELIVERY N/A 10/24/2019    Procedure: LUMBAR PUNCTURE, WITH INTRATHECAL CHEMOTHERAPY ADMINISTRATION;  Surgeon: Félix Van, YOHAN CNP;  [facility]      SPINAL PUNCTURE,LUMBAR, INTRATHECAL CHEMO DELIVERY N/A 10/31/2019    Procedure: Lumbar puncture with IT Chemo (not CD);  Surgeon: Reynaldo Campuzano MD;  [facility]      THORACENTESIS N/A 8/31/2019    Procedure: Thoracentesis;  Surgeon: Michell Keith MD;  [facility]: [facility] OR       Medications   Current Outpatient Medications on File Prior to Visit   Medication Sig Dispense Refill     acetaminophen (TYLENOL) 325 MG tablet Take 2 tablets (650 mg) by mouth every 6 hours as needed for mild pain 60 tablet 0     diphenhydrAMINE (BENADRYL) 25 MG capsule Take 1-2 capsules (25-50 mg) by mouth every 6 hours as needed (Breakthrough Nausea and Vomiting ) 30 capsule 1     enoxaparin ANTICOAGULANT (LOVENOX) 100 MG/ML syringe Inject 1 mL (100 mg) Subcutaneous every 12 hours 180 mL 0     melatonin 3 MG tablet Take 1 tablet (3 mg) by mouth At Bedtime 30 tablet 1     oxyCODONE (ROXICODONE) 10 MG tablet Take 1 tablet (10 mg)  by mouth every 4 hours as needed for severe pain 42 tablet 0     pantoprazole (PROTONIX) 40 MG EC tablet Take 1 tablet (40 mg) by mouth every morning (before breakfast) 30 tablet 0     polyethylene glycol (MIRALAX/GLYCOLAX) packet Take 17 g by mouth daily 30 packet 0     scopolamine (TRANSDERM) 1 MG/3DAYS 72 hr patch Place 1 patch onto the skin every 72 hours 10 patch 3     sennosides (SENOKOT) 8.6 MG tablet Take 2 tablets by mouth 2 times daily as needed for constipation 60 each 1     Allergies   Allergies   Allergen Reactions     Asparaginase Derivatives Other (See Comments)     Severe pancreatitis     No Known Drug Allergies        Social History   I have updated and reviewed the following Social History Narrative:   Pediatric History   Patient Parents     SHARONKADEEMJHONATHAN (Mother)     JENNIFERAVNI (Father)     Other Topics Concern     Not on file   Social History Narrative    Lives at home in [place] with Dad and Step-Mom. Biological mother is involved in his life and accompanied him during this hospitalization. Has 4 step-siblings and 1 biological sibling. 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 Has completed high school.       Family History   I have reviewed this patient's family history and updated it with pertinent information if needed.   Family History   Problem Relation Age of Onset     No Known Problems Mother      No Known Problems Father      Asthma Brother      Thyroid Cancer Paternal Grandmother      Melanoma Paternal Aunt        Review of Systems   The 10 point Review of Systems is negative other than noted in the HPI or here.     Physical Exam   Vital Signs with Ranges  Temp:  [98.2  F (36.8  C)-98.4  F (36.9  C)] 98.4  F (36.9  C)  Pulse:  [] 82  Resp:  [16] 16  BP: ()/(42-50) 106/49  SpO2:  [97 %-98 %] 97 %    General: Lazaro is alert, interactive and appropriate. He appears  well, is upbeat and in no obvious pain.  HEENT: Skull is atrauamatic and normocephalic.  Full head of hair. PERRLA, sclera are non icteric and not injected, EOM are intact, gaze slightly disconjugate which is his baseline. Nares are patent without drainage. Oropharynx clear, no plaques noted. Buccal mucosa and tongue clear. MMM. Tympanic membranes are opaque bilaterally with light reflex and landmarks present.  Voice no longer hoarse.  Lymph:  Neck is supple without lymphadenopathy.  There is no supraclavicular, axillary or inguinal lymphadenopathy palpated.  Cardiovascular:  HR is mildly tachycardic, S1, S2 no murmur.  Capillary refill is < 2 seconds. Right arm without edema or erythema.  No pitting edema.  Cap refill < 2 sec. Peripheral pulses 2+/=. He has mildly distended veins noted on the left upper chest, not extending up to the neck, overlying the pectoralis.   Respiratory: No cough noted. Respirations are easy.  Lungs are clear to auscultation through out.  No crackles or wheezes.  Gastrointestinal:  BS present in all quadrants.  Abdomen is soft and flat.  Lazaro denies LUQ discomfort, no pain with palpation. No hepatosplenomegaly or masses are palpated.  Skin: Faint dry skin remains around the circumference of his bicep. No rashes, bruises or other skin lesions noted.  Neurological:  CN 2-12 grossly intact. Gait is normal.  No issues with balance. Sensation intact in hands and feet.     Musculoskeletal:  Good strength and ROM in all extremities.  Strong dorsiflexion at ankles and great toes (5/5) bilaterally without any pain at the Achilles.    Data   Results for orders placed or performed in visit on 12/12/19 (from the past 24 hour(s))   CBC with platelets differential   Result Value Ref Range    WBC 1.3 (L) 4.0 - 11.0 10e9/L    RBC Count 1.95 (L) 4.4 - 5.9 10e12/L    Hemoglobin 6.2 (LL) 13.3 - 17.7 g/dL    Hematocrit 18.1 (L) 40.0 - 53.0 %    MCV 93 78 - 100 fl    MCH 31.8 26.5 - 33.0 pg    MCHC 34.3 31.5  - 36.5 g/dL    RDW 15.9 (H) 10.0 - 15.0 %    Platelet Count 183 150 - 450 10e9/L    Diff Method Manual Differential     % Neutrophils 27.8 %    % Lymphocytes 47.0 %    % Monocytes 7.8 %    % Eosinophils 17.4 %    % Basophils 0.0 %    Nucleated RBCs 1 (H) 0 /100    Absolute Neutrophil 0.4 (LL) 1.6 - 8.3 10e9/L    Absolute Lymphocytes 0.6 (L) 0.8 - 5.3 10e9/L    Absolute Monocytes 0.1 0.0 - 1.3 10e9/L    Absolute Eosinophils 0.2 0.0 - 0.7 10e9/L    Absolute Basophils 0.0 0.0 - 0.2 10e9/L    Absolute Nucleated RBC 0.0     Anisocytosis Slight     Poikilocytosis Slight     Microcytes Present     Platelet Estimate Confirming automated cell count    ABO/Rh type and screen   Result Value Ref Range    Units Ordered 2     ABO O     RH(D) Pos     Antibody Screen Neg     Test Valid Only At          [facility]    Specimen Expires 12/15/2019     Crossmatch Red Blood Cells    Blood component   Result Value Ref Range    Unit Number [id number]     Blood Component Type Red Blood Cells LeukoRed Irrad (Part 2)     Division Number 00     Status of Unit Released to care unit 12/12/2019 1216     Blood Product Code [id number]     Unit Status ISS    Blood component   Result Value Ref Range    Unit Number [id number]     Blood Component Type Red Blood Cells LeukoReduced Irradiated     Division Number 00     Status of Unit Ready for patient 12/12/2019 1214     Blood Product Code [id number]     Unit Status FRANCOISE

## 2019-12-12 ENCOUNTER — INFUSION THERAPY VISIT (OUTPATIENT)
Dept: INFUSION THERAPY | Facility: CLINIC | Age: 19
End: 2019-12-12
Attending: NURSE PRACTITIONER
Payer: MEDICAID

## 2019-12-12 ENCOUNTER — OFFICE VISIT (OUTPATIENT)
Dept: PEDIATRIC HEMATOLOGY/ONCOLOGY | Facility: CLINIC | Age: 19
End: 2019-12-12
Attending: NURSE PRACTITIONER
Payer: MEDICAID

## 2019-12-12 VITALS
HEART RATE: 82 BPM | WEIGHT: 172.84 LBS | HEIGHT: 72 IN | SYSTOLIC BLOOD PRESSURE: 100 MMHG | RESPIRATION RATE: 16 BRPM | DIASTOLIC BLOOD PRESSURE: 50 MMHG | BODY MASS INDEX: 23.41 KG/M2 | TEMPERATURE: 98.4 F | OXYGEN SATURATION: 99 %

## 2019-12-12 DIAGNOSIS — E55.9 VITAMIN D DEFICIENCY: ICD-10-CM

## 2019-12-12 DIAGNOSIS — I82.623 ACUTE DEEP VEIN THROMBOSIS (DVT) OF OTHER VEIN OF BOTH UPPER EXTREMITIES (H): ICD-10-CM

## 2019-12-12 DIAGNOSIS — C95.00 ACUTE LEUKEMIA NOT HAVING ACHIEVED REMISSION (H): ICD-10-CM

## 2019-12-12 DIAGNOSIS — C83.52 LYMPHOBLASTIC LYMPHOMA OF CHEST (H): Primary | ICD-10-CM

## 2019-12-12 DIAGNOSIS — C83.50 T LYMPHOBLASTIC LYMPHOMA (H): Primary | ICD-10-CM

## 2019-12-12 LAB
ABO + RH BLD: NORMAL
ABO + RH BLD: NORMAL
ANISOCYTOSIS BLD QL SMEAR: SLIGHT
BASOPHILS # BLD AUTO: 0 10E9/L (ref 0–0.2)
BASOPHILS NFR BLD AUTO: 0 %
BLD GP AB SCN SERPL QL: NORMAL
BLD PROD TYP BPU: NORMAL
BLD UNIT ID BPU: 0
BLD UNIT ID BPU: 0
BLOOD BANK CMNT PATIENT-IMP: NORMAL
BLOOD PRODUCT CODE: NORMAL
BLOOD PRODUCT CODE: NORMAL
BPU ID: NORMAL
BPU ID: NORMAL
DEPRECATED CALCIDIOL+CALCIFEROL SERPL-MC: 26 UG/L (ref 20–75)
DIFFERENTIAL METHOD BLD: ABNORMAL
EOSINOPHIL # BLD AUTO: 0.2 10E9/L (ref 0–0.7)
EOSINOPHIL NFR BLD AUTO: 17.4 %
ERYTHROCYTE [DISTWIDTH] IN BLOOD BY AUTOMATED COUNT: 15.9 % (ref 10–15)
HCT VFR BLD AUTO: 18.1 % (ref 40–53)
HGB BLD-MCNC: 6.2 G/DL (ref 13.3–17.7)
LYMPHOCYTES # BLD AUTO: 0.6 10E9/L (ref 0.8–5.3)
LYMPHOCYTES NFR BLD AUTO: 47 %
MCH RBC QN AUTO: 31.8 PG (ref 26.5–33)
MCHC RBC AUTO-ENTMCNC: 34.3 G/DL (ref 31.5–36.5)
MCV RBC AUTO: 93 FL (ref 78–100)
MICROCYTES BLD QL SMEAR: PRESENT
MONOCYTES # BLD AUTO: 0.1 10E9/L (ref 0–1.3)
MONOCYTES NFR BLD AUTO: 7.8 %
NEUTROPHILS # BLD AUTO: 0.4 10E9/L (ref 1.6–8.3)
NEUTROPHILS NFR BLD AUTO: 27.8 %
NRBC # BLD AUTO: 0 10*3/UL
NRBC BLD AUTO-RTO: 1 /100
NUM BPU REQUESTED: 2
PLATELET # BLD AUTO: 183 10E9/L (ref 150–450)
PLATELET # BLD EST: ABNORMAL 10*3/UL
POIKILOCYTOSIS BLD QL SMEAR: SLIGHT
RBC # BLD AUTO: 1.95 10E12/L (ref 4.4–5.9)
SPECIMEN EXP DATE BLD: NORMAL
TRANSFUSION STATUS PATIENT QL: NORMAL
WBC # BLD AUTO: 1.3 10E9/L (ref 4–11)

## 2019-12-12 PROCEDURE — 86923 COMPATIBILITY TEST ELECTRIC: CPT | Performed by: NURSE PRACTITIONER

## 2019-12-12 PROCEDURE — 86850 RBC ANTIBODY SCREEN: CPT | Performed by: NURSE PRACTITIONER

## 2019-12-12 PROCEDURE — 85025 COMPLETE CBC W/AUTO DIFF WBC: CPT | Performed by: STUDENT IN AN ORGANIZED HEALTH CARE EDUCATION/TRAINING PROGRAM

## 2019-12-12 PROCEDURE — 86901 BLOOD TYPING SEROLOGIC RH(D): CPT | Performed by: NURSE PRACTITIONER

## 2019-12-12 PROCEDURE — 96409 CHEMO IV PUSH SNGL DRUG: CPT

## 2019-12-12 PROCEDURE — 25000128 H RX IP 250 OP 636: Mod: ZF

## 2019-12-12 PROCEDURE — 86900 BLOOD TYPING SEROLOGIC ABO: CPT | Performed by: NURSE PRACTITIONER

## 2019-12-12 PROCEDURE — P9040 RBC LEUKOREDUCED IRRADIATED: HCPCS | Performed by: NURSE PRACTITIONER

## 2019-12-12 PROCEDURE — 36430 TRANSFUSION BLD/BLD COMPNT: CPT

## 2019-12-12 PROCEDURE — 25000128 H RX IP 250 OP 636: Mod: ZF | Performed by: PEDIATRICS

## 2019-12-12 PROCEDURE — 82306 VITAMIN D 25 HYDROXY: CPT | Performed by: STUDENT IN AN ORGANIZED HEALTH CARE EDUCATION/TRAINING PROGRAM

## 2019-12-12 PROCEDURE — 25800030 ZZH RX IP 258 OP 636: Mod: ZF | Performed by: PEDIATRICS

## 2019-12-12 RX ORDER — ONDANSETRON 8 MG/1
8 TABLET, ORALLY DISINTEGRATING ORAL EVERY 6 HOURS PRN
Qty: 20 TABLET | Refills: 0 | Status: SHIPPED | OUTPATIENT
Start: 2019-12-12 | End: 2020-02-04

## 2019-12-12 RX ORDER — MULTIVIT-MIN/IRON/FOLIC ACID/K 18-600-40
2000 CAPSULE ORAL DAILY
Qty: 60 TABLET | Refills: 3 | Status: ON HOLD | OUTPATIENT
Start: 2019-12-12 | End: 2020-10-06

## 2019-12-12 RX ORDER — HEPARIN SODIUM (PORCINE) LOCK FLUSH IV SOLN 100 UNIT/ML 100 UNIT/ML
5 SOLUTION INTRAVENOUS
Status: DISCONTINUED | OUTPATIENT
Start: 2019-12-12 | End: 2019-12-12 | Stop reason: HOSPADM

## 2019-12-12 RX ORDER — HEPARIN SODIUM (PORCINE) LOCK FLUSH IV SOLN 100 UNIT/ML 100 UNIT/ML
SOLUTION INTRAVENOUS
Status: COMPLETED
Start: 2019-12-12 | End: 2019-12-12

## 2019-12-12 RX ORDER — SULFAMETHOXAZOLE/TRIMETHOPRIM 800-160 MG
1 TABLET ORAL
Qty: 16 TABLET | Refills: 3 | Status: SHIPPED | OUTPATIENT
Start: 2019-12-16 | End: 2020-01-20

## 2019-12-12 RX ADMIN — VINCRISTINE SULFATE 2 MG: 1 INJECTION, SOLUTION INTRAVENOUS at 11:50

## 2019-12-12 RX ADMIN — HEPARIN: 100 SYRINGE at 16:45

## 2019-12-12 RX ADMIN — HEPARIN SODIUM (PORCINE) LOCK FLUSH IV SOLN 100 UNIT/ML: 100 SOLUTION at 16:45

## 2019-12-12 ASSESSMENT — MIFFLIN-ST. JEOR: SCORE: 1830.25

## 2019-12-12 NOTE — PROGRESS NOTES
Infusion Nursing Note    Lazaro Lund Presents to [facility] today for:Chemotherapy and PRBC's     Due to :    T lymphoblastic lymphoma (H)  Vitamin D deficiency    Patient seen by Provider : Yes: Dr Fischer and Justen    Note: Patient hypotensive and tachycardic on arrival - Dr. Fischer notified. Vinc given via gravity without issue. Blood return noted pre/mid/post. 2 units of PRBC's transfused over 2 hours each.    Intravenous Access:   Port Accessed using sterile technique. Site cleaned with chloraprep and dressed with Tegaderm    Treatment conditions: Hemoglobin and Blood Transfusion consent signed : HgB 6.2 today. Dr. Fischer complete new consent for PRBC's today. Patient denies constipation, new numbness/tingling, trouble ambulating and jaw pain.    Parameters Met for treatment    Education provided: Yes: about blood transfusions    Post Infusion Assessment: Patient tolerated infusion, Vital signs remained stable throughout and Port flushed, heparin locked and de-accessed without issue    Discharge Plan:   Prescription refills given.  Patient verbalized understanding of discharge instructions, all questions answered. Patient left clinic accompanied by Self

## 2019-12-12 NOTE — LETTER
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 He is currently day  50 of consolidation. With induction, he had a CRu, resolution of lymphadenopathy, but persistence of small pleural effusion. Induction was complicated by development of extensive upper DVT complicated by edema and folliculitis preventing day 29 LP, and consolidation was complicated by severe PEG-asparaginase induced necrotizing pancreatitis, being monitored by Dr. Montgomery. As a result, PEG-asparaginase has been discontinued from his treatment regimen. 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     Patient Active Problem List   Diagnosis     T lymphoblastic lymphoma (H)     DVT (deep venous thrombosis) (H)     Cushingoid side effect of steroids (H)     Anticoagulated     Vitamin D deficiency     Port-A-Cath in place     Immunosuppressed due to chemotherapy     Acute pancreatitis due to PEGaspariginase therapy      Neuropathic pain     Insomnia due to medical condition     Plan  1. Follow up abdominal CT on 12/19/19 with ongoing follow-up with Dr. Coronel. Will also repeat chest CT at this time to evaluate residual pleural effusion.   2. Continue lovenox at current dose. Check Xa level monthly (due early January). He is aware of the need to hold PM prior and AM of his LPs. Will maintain platelet count > 30K while on anticoagulation. Plan to repeat US in January.  3. Continue vitamin D, level repeated today  4. Day 29 induction LP that was deferred will be made up during maintenance therapy  5. Given recent pancreatitis and intermediate TPMT phenotype his 6MP dose was reduced for the 2nd half of Consolidation by 50%.  Will obtain TPMT genotype once that testing becomes available again.   6. Given  significant spinal headache history will plan for IV caffeine following LPs in the future.   7. Consolidation Day 50 vincristine along with PRBC transfusion for chemotherapy associated anemia (hgb 6.2) today  8. RTC on 12/19/19 for scans and to start interim maintenance with IT methotrexate, VCR, and capizzi IV methotrexate (PEGasp omitted as described) pending counts.   9. Will plan for repeat ECHO towards end of IM in preparation for DI which contains additional anthracycline      Signed,  Nicho Fischer   Pediatric Hematology/Oncology Fellow    Physician Attestation    I saw and evaluated the patient. I discussed with the fellow and agree with the findings and plan as documented in the fellow's note.     Reynaldo Campuzano MD  Pediatric Hematology/Oncology  [facility]    Primary Care Physician   Rodriguez Montoya      Oncology History  Lazaro Lund is a 19 year old young man with T-cell lymphoblastic lymphoma, pratt stage III. Lazaro presented with chronic cough with progressive orthopnea and was found to have an anterior mediastinal mass and malignant left-sided pleural effusion.  A CT guided biopsy was obtained at [facility] and pathology was consistent with T-cell neoplasm.  He was admitted to peds oncology service 8/30 and started on treatment per [id number].  He had a pleural effusion that required a chest tube and a pericardial effusion that was not drained. His Induction was complicated by cardiac compression secondary to his mass leading to tamponade, this improved through out his hospital stay.  He also had dysphagia that improved with treatment of his mass, swallow study was normal. His induction course was complicated by extensive bilateral UE DVTs for which anticoagulation was started. On 10/24, he was able to have a port-a-cath placed in his upper extremity.  At end of induction, he had a CRu response (persistent effusion on imaging). Consolidation was  complicated by the development of severe asparaginase induced pancreatitis. He became quite ill with SIRS and associated hypotension, he required pressor support in the ICU.  Lazaro also had severe pain that was initially managed with a PCA and transitioned to oral prior to discharge. Due to this, asparaginase was omitted from his treatment plan.      Interim History  Lazaro has had a good week. His abdominal pain finally fully resolved as of two days ago. He continues to have no issues with eating and is drinking adequately. No fevers at home, no vomiting, no headaches. He does have lower energy and became winded walking from the parking lot. He is sure he needs a blood transfusion today. He has no issues taking his lovenox - no bleeding side effects aside from injection site bruising.     Past Medical History    I have reviewed this patient's medical history and updated it with pertinent information if needed.   Past Medical History:   Diagnosis Date     Acute necrotizing pancreatitis 11/07/2019    attributed to asparaginase     DVT of upper extremity (deep vein thrombosis) (H) 09/26/2019    Bilateral      Edema of upper extremity 10/17/2019     Folliculitis 10/17/2019     Migraine 2006    have resolved     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       Past Surgical History   I have reviewed this patient's surgical history and updated it with pertinent information if needed.  Past Surgical History:   Procedure Laterality Date     BONE MARROW BIOPSY, BONE SPECIMEN, NEEDLE/TROCAR Left 9/1/2019    Procedure: BIOPSY, BONE MARROW;  Surgeon: Heather Lopez MD;  Location: [facility] OR     INSERT PICC LINE N/A 8/31/2019    Procedure: INSERTION, PICC;  Surgeon: Michell Keith MD;  Location: [facility] OR     INSERT PORT VASCULAR ACCESS N/A 10/24/2019    Procedure: INSERTION, VASCULAR ACCESS PORT;  Surgeon: Silviano Martins MD;  Location: [facility] PEDS SEDATION      IR CHEST PORT PLACEMENT > 5 YRS OF AGE  10/24/2019     IR CHEST TUBE  PLACEMENT NON-TUNNELLED LEFT  8/31/2019     IR PICC PLACEMENT > 5 YRS OF AGE  8/31/2019     IR PORT CHECK RIGHT  11/12/2019     SPINAL PUNCTURE,LUMBAR, INTRATHECAL CHEMO DELIVERY N/A 8/31/2019    Procedure: LUMBAR PUNCTURE, WITH INTRATHECAL CHEMOTHERAPY ADMINISTRATION;  Surgeon: Heather Lopez MD;  [facility] OR     SPINAL PUNCTURE,LUMBAR, INTRATHECAL CHEMO DELIVERY N/A 9/9/2019    Procedure: Lumbar Puncture With Intrathecal Chemo;  Surgeon: Alexi Hayes MD;  [facility]     SPINAL PUNCTURE,LUMBAR, INTRATHECAL CHEMO DELIVERY N/A 10/10/2019    Procedure: Lumbar puncture with IT Chemo (CD);  Surgeon: Reynaldo Campuzano MD;  [facility]      SPINAL PUNCTURE,LUMBAR, INTRATHECAL CHEMO DELIVERY N/A 10/17/2019    Procedure: Lumbar puncture with IT Chemo (not CD);  Surgeon: Reynaldo Campuzano MD;  [facility]      SPINAL PUNCTURE,LUMBAR, INTRATHECAL CHEMO DELIVERY N/A 10/24/2019    Procedure: LUMBAR PUNCTURE, WITH INTRATHECAL CHEMOTHERAPY ADMINISTRATION;  Surgeon: Félix Van, APRN CNP;  [facility]      SPINAL PUNCTURE,LUMBAR, INTRATHECAL CHEMO DELIVERY N/A 10/31/2019    Procedure: Lumbar puncture with IT Chemo (not CD);  Surgeon: Reynaldo Campuzano MD;  [facility]      THORACENTESIS N/A 8/31/2019    Procedure: Thoracentesis;  Surgeon: Michell Keith MD;  [facility]       Medications   Current Outpatient Medications on File Prior to Visit   Medication Sig Dispense Refill     acetaminophen (TYLENOL) 325 MG tablet Take 2 tablets (650 mg) by mouth every 6 hours as needed for mild pain 60 tablet 0     diphenhydrAMINE (BENADRYL) 25 MG capsule Take 1-2 capsules (25-50 mg) by mouth every 6 hours as needed (Breakthrough Nausea and Vomiting ) 30 capsule 1     enoxaparin ANTICOAGULANT (LOVENOX) 100 MG/ML syringe Inject 1 mL (100 mg) Subcutaneous every 12 hours 180 mL 0     melatonin 3 MG tablet Take 1 tablet (3 mg)  by mouth At Bedtime 30 tablet 1     oxyCODONE (ROXICODONE) 10 MG tablet Take 1 tablet (10 mg) by mouth every 4 hours as needed for severe pain 42 tablet 0     pantoprazole (PROTONIX) 40 MG EC tablet Take 1 tablet (40 mg) by mouth every morning (before breakfast) 30 tablet 0     polyethylene glycol (MIRALAX/GLYCOLAX) packet Take 17 g by mouth daily 30 packet 0     scopolamine (TRANSDERM) 1 MG/3DAYS 72 hr patch Place 1 patch onto the skin every 72 hours 10 patch 3     sennosides (SENOKOT) 8.6 MG tablet Take 2 tablets by mouth 2 times daily as needed for constipation 60 each 1     Allergies   Allergies   Allergen Reactions     Asparaginase Derivatives Other (See Comments)     Severe pancreatitis     No Known Drug Allergies        Social History   I have updated and reviewed the following Social History Narrative:   Pediatric History   Patient Parents     SHARONKADEEMJHONATHAN (Mother)     JENNIFERAVNI (Father)     Other Topics Concern     Not on file   Social History Narrative    Lives at home in [place] with Dad and Step-Mom. Biological mother is involved in his life and accompanied him during this hospitalization. Has 4 step-siblings and 1 biological sibling. 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 Has completed high school.       Family History   I have reviewed this patient's family history and updated it with pertinent information if needed.   Family History   Problem Relation Age of Onset     No Known Problems Mother      No Known Problems Father      Asthma Brother      Thyroid Cancer Paternal Grandmother      Melanoma Paternal Aunt        Review of Systems   The 10 point Review of Systems is negative other than noted in the HPI or here.     Physical Exam   Vital Signs with Ranges  Temp:  [98.2  F (36.8  C)-98.4  F (36.9  C)] 98.4  F (36.9  C)  Pulse:  [] 82  Resp:  [16] 16  BP: ()/(42-50)  106/49  SpO2:  [97 %-98 %] 97 %    General: Lazaro is alert, interactive and appropriate. He appears well, is upbeat and in no obvious pain.  HEENT: Skull is atrauamatic and normocephalic.  Full head of hair. PERRLA, sclera are non icteric and not injected, EOM are intact, gaze slightly disconjugate which is his baseline. Nares are patent without drainage. Oropharynx clear, no plaques noted. Buccal mucosa and tongue clear. MMM. Tympanic membranes are opaque bilaterally with light reflex and landmarks present.  Voice no longer hoarse.  Lymph:  Neck is supple without lymphadenopathy.  There is no supraclavicular, axillary or inguinal lymphadenopathy palpated.  Cardiovascular:  HR is mildly tachycardic, S1, S2 no murmur.  Capillary refill is < 2 seconds. Right arm without edema or erythema.  No pitting edema.  Cap refill < 2 sec. Peripheral pulses 2+/=. He has mildly distended veins noted on the left upper chest, not extending up to the neck, overlying the pectoralis.   Respiratory: No cough noted. Respirations are easy.  Lungs are clear to auscultation through out.  No crackles or wheezes.  Gastrointestinal:  BS present in all quadrants.  Abdomen is soft and flat.  Lazaro denies LUQ discomfort, no pain with palpation. No hepatosplenomegaly or masses are palpated.  Skin: Faint dry skin remains around the circumference of his bicep. No rashes, bruises or other skin lesions noted.  Neurological:  CN 2-12 grossly intact. Gait is normal.  No issues with balance. Sensation intact in hands and feet.     Musculoskeletal:  Good strength and ROM in all extremities.  Strong dorsiflexion at ankles and great toes (5/5) bilaterally without any pain at the Achilles.    Data   Results for orders placed or performed in visit on 12/12/19 (from the past 24 hour(s))   CBC with platelets differential   Result Value Ref Range    WBC 1.3 (L) 4.0 - 11.0 10e9/L    RBC Count 1.95 (L) 4.4 - 5.9 10e12/L    Hemoglobin 6.2 (LL) 13.3 - 17.7 g/dL     Hematocrit 18.1 (L) 40.0 - 53.0 %    MCV 93 78 - 100 fl    MCH 31.8 26.5 - 33.0 pg    MCHC 34.3 31.5 - 36.5 g/dL    RDW 15.9 (H) 10.0 - 15.0 %    Platelet Count 183 150 - 450 10e9/L    Diff Method Manual Differential     % Neutrophils 27.8 %    % Lymphocytes 47.0 %    % Monocytes 7.8 %    % Eosinophils 17.4 %    % Basophils 0.0 %    Nucleated RBCs 1 (H) 0 /100    Absolute Neutrophil 0.4 (LL) 1.6 - 8.3 10e9/L    Absolute Lymphocytes 0.6 (L) 0.8 - 5.3 10e9/L    Absolute Monocytes 0.1 0.0 - 1.3 10e9/L    Absolute Eosinophils 0.2 0.0 - 0.7 10e9/L    Absolute Basophils 0.0 0.0 - 0.2 10e9/L    Absolute Nucleated RBC 0.0     Anisocytosis Slight     Poikilocytosis Slight     Microcytes Present     Platelet Estimate Confirming automated cell count    ABO/Rh type and screen   Result Value Ref Range    Units Ordered 2     ABO O     RH(D) Pos     Antibody Screen Neg     Test Valid Only At          [facility]    Specimen Expires 12/15/2019     Crossmatch Red Blood Cells    Blood component   Result Value Ref Range    Unit Number [id number]     Blood Component Type Red Blood Cells LeukoRed Irrad (Part 2)     Division Number 00     Status of Unit Released to care unit 12/12/2019 1216     Blood Product Code [id number]     Unit Status ISS    Blood component   Result Value Ref Range    Unit Number [id number]     Blood Component Type Red Blood Cells LeukoReduced Irradiated     Division Number 00     Status of Unit Ready for patient 12/12/2019 1214     Blood Product Code [id number]     Unit Status FRANCOISE            Nicho Fischer MD

## 2019-12-17 NOTE — PROGRESS NOTES
Outpatient Physical Therapy Discharge Note     Patient: Lazaro Lund  : 2000    Beginning/End Dates of Reporting Period:  10/11/2019 to 2019    Referring Provider: T lymphoblastic lymphoma and acute DVT of other vein of both upper extremities    Therapy Diagnosis: Right UE edema associated with DVT     Client Self Report: See eval          Goals:  Goal Identifier Independent   Goal Description Patient will understand his edema cause and needs for treatment. Patient will understand and be independent with self-MLD which is to be used in the future once his DVT resolves, Cardiac/Pulmonary Edema resolves and patient has no infection.   Target Date 10/11/19   Date Met  10/11/19   Progress:       Progress Toward Goals:   Progress this reporting period: Due to the patient's multiple system complications including active infection, active DVT, and active cancer, the patient's treatment consisted of providing the patient with ways to manage his own symptoms. The patient understood and stated he would perform at home. His chart was left open for two months and the patient was able to schedule appointments if his edema had worsened.      Plan:  Discharge from therapy.    Discharge:    Reason for Discharge: Change in medical status.    Equipment Issued: None    Discharge Plan: Patient to continue home program.

## 2019-12-17 NOTE — PROGRESS NOTES
"    Pain and Advanced/Complex Care Team (PACCT)   Outpatient Pain Management Clinic, Follow-up Visit    Lazaro Lund MRN#: [id number]   Age: 19 year old YOB: 2000   Date: Nov 21, 2019 Primary care provider: Rodriguez Montoya     Reason and/or Goals of Clinic Visit: symptom assessment, medication management, and treatment adjustment.    Lazaro Lund was accompanied by his grandmother, and I spent a total of 25 minutes face-to-face with them during today s office visit. Over 50% of this time was spent counseling Lazaro and/or coordinating care regarding pain & analgesic management.  The following is a summary of our conversation; additional information was obtained from a review of relevant medical records.  This is his first PACCT clinic visit since hospital discharge on 11/17/19.    SUBJECTIVE ASSESSMENT  History of the Present Illness  Lazaro Lund is a 19-year-old male who was admitted from 11/07 to 11/17 for asparaginase-associated pancreatitis secondary to treatment for T-cell lymphoblastic lymphoma.  PACCT was consulted during this admission for assistance with opioid rotation and tapering.  This consult took place on 11/14/19.  At the time of consult, he was on a hydromorphone continuous infusion + PCA, and I recommended rotation to oral oxycodone and to stop just his continuous infusion, leaving PCA boluses in place.  The PCA boluses were discontinued one day later.  He was discharged on 10 mg oxycodone q4h scheduled, with plans to follow-up with me today for further analgesic management.    Interim History    When asked what things are good and/or better since our last visit, Lazaro states that things are going very well.  He states that his pain is down \"a lot\", and he is no longer nauseated nor is he throwing up.      When asked what things are bad and/or worse since our last visit, Lazaro states that he is having significant problems with sleep.    Pain Assessment(s)  First " "Complaint: Abdominal pain  Onset/History: Abdominal pain (midline upper abdomen) started around 19 with accompanying nausea and intermittent vomiting  Provocation/Palliation:  \"Nothing\" seems to make the pain better.  Lying down and eating a lot make the pain worse.  Quality: Currently, it feels \"like my pancreatitis pain, but less intense\"  Region/Radiation: Left lower quadrant  Severity (since hospital discharge; assessed using the 0-10 Numeric Pain Rating Scale, with 10 being the worst pain imaginable):   Today   Current: 0   Best: 0   Worst: 7   Usual: 5   Timing/frequency/duration: Pain persists \"until I sit up\"    Note that Lazaro states that he is able to function with little to no limitation when his pain is 8 out of 10 or less.    NEW pain complaint(s):    Lazaro does not report any new complaints of pain.      Emergency department (ED) visits since last visit: 0    Hospitalizations since last visit: 0    Assessment of Normal Life Functions (since hospital discharge on )  School Attendance: n/a (not attending school)  Sports Participation: NOT ASSESSED  Social: NOT ASSESSED  Sleep: WORSE   - Time in bed (weekdays): 11 pm   - Time to fall asleep: ~30 minutes   - Out of bed in the mornin am, due to pain. Does not feel rested in the morning.   - Wakes during the night (due to pain): YES, usually every night   - Concerning sleep habits: napping during the day   - Physical indicators of an underlying sleep disorder: None   - Medications used to help with sleep: melatonin    Participation in Rehabilitation Pain Program  Physical Therapy: n/a  Psychology: n/a  Integrative Medicine: n/a    Past Medical/Social & Family History  Reviewed in the EMR; no significant changes were made.    Review of Systems    A comprehensive review of systems was performed, and was negative other than what was described in the interim history.      OBJECTIVE ASSESSMENT  Medications  The current home analgesic medication " regimen for Lazaro Lund consists of the following:  SIMPLE ANALGESIA   - acetaminophen, 500 mg PRN  - Typical use: daily  - Helps with pain: NO    OPIOID THERAPY   - oxycodone, 10 mg q4h PRN  - Typical use: 3 times/day  - Helps with pain: NO    CO-ANALGESIA/NEUROMODULATORS   None    ADJUVANT ANALGESIA   None    Other Pertinent Medications (including OTCs/herbal medications)   - scopolamine patches and ondansetron for nausea/vomiting   - melatonin for insomnia    The [place] Prescription Monitoring Program Database has not been reviewed.    Physical Examination  Vitals: There were no vitals taken for this visit.    Physical exam deferred per patient request.      OVERALL ASSESSMENT  Lazaro Lund is a 19 year old male with  (1) PEG-asparaginase associated pancreatitis  (2) Neuropathic abdominal pain secondary to the above  (3) Insomnia, secondary to abdominal pain  (4) Functional impairments due to chronic pain, including:  (a) Problem with school attendance  (b) Physical deconditioning  (c) Circadian rhythm sleep disturbance    Impact of pain on quality of life: MODERATE. Pain limits some, but not all, areas of function (e.g. school & sleep).      RECOMMENDATIONS/PLAN, COUNSELING & COORDINATION  NEUROPATHIC ABDOMINAL PAIN  INSOMNIA SECONDARY TO ABDOMINAL PAIN   - Start amitriptyline, 10 mg PO qHS  This should help with both his neuropathic abdominal pain, and is sedating, so it should help with sleep.  10 mg once at night is a starting dose.  Therefore, it can be increased up to 25 mg qHS if only somewhat effective.  Doses higher than 25 mg are typically reserved for patients with major depressive disorder.    Follow-Up: With me as needed    Ruddy Campos MD, MAEd   of Pediatrics  Medical Director, Pain and Advanced/Complex Care Team (PACCT)  [facility]  PACCT Pager: [phone]

## 2019-12-19 ENCOUNTER — ANESTHESIA (OUTPATIENT)
Dept: PEDIATRICS | Facility: CLINIC | Age: 19
End: 2019-12-19

## 2019-12-19 ENCOUNTER — ANESTHESIA EVENT (OUTPATIENT)
Dept: PEDIATRICS | Facility: CLINIC | Age: 19
End: 2019-12-19

## 2019-12-19 ENCOUNTER — HOSPITAL ENCOUNTER (OUTPATIENT)
Facility: CLINIC | Age: 19
Discharge: HOME | End: 2019-12-19
Attending: RADIOLOGY | Admitting: RADIOLOGY
Payer: MEDICAID

## 2019-12-19 ENCOUNTER — HOSPITAL ENCOUNTER (OUTPATIENT)
Dept: CT IMAGING | Facility: CLINIC | Age: 19
End: 2019-12-19
Attending: INTERNAL MEDICINE
Payer: MEDICAID

## 2019-12-19 VITALS
DIASTOLIC BLOOD PRESSURE: 71 MMHG | RESPIRATION RATE: 16 BRPM | HEIGHT: 73 IN | HEART RATE: 83 BPM | BODY MASS INDEX: 23.81 KG/M2 | TEMPERATURE: 97.6 F | SYSTOLIC BLOOD PRESSURE: 118 MMHG | OXYGEN SATURATION: 98 % | WEIGHT: 179.68 LBS

## 2019-12-19 DIAGNOSIS — K85.31 DRUG-INDUCED ACUTE PANCREATITIS WITH UNINFECTED NECROSIS: ICD-10-CM

## 2019-12-19 DIAGNOSIS — C83.50 T LYMPHOBLASTIC LYMPHOMA (H): ICD-10-CM

## 2019-12-19 DIAGNOSIS — C83.50 T LYMPHOBLASTIC LYMPHOMA (H): Primary | ICD-10-CM

## 2019-12-19 LAB
ALBUMIN SERPL-MCNC: 3.5 G/DL (ref 3.4–5)
ALP SERPL-CCNC: 84 U/L (ref 40–150)
ALT SERPL W P-5'-P-CCNC: 55 U/L (ref 0–70)
ANION GAP SERPL CALCULATED.3IONS-SCNC: 4 MMOL/L (ref 3–14)
ANISOCYTOSIS BLD QL SMEAR: SLIGHT
AST SERPL W P-5'-P-CCNC: 20 U/L (ref 0–45)
BASOPHILS # BLD AUTO: 0 10E9/L (ref 0–0.2)
BASOPHILS NFR BLD AUTO: 0.9 %
BILIRUB SERPL-MCNC: 0.2 MG/DL (ref 0.2–1.3)
BUN SERPL-MCNC: 10 MG/DL (ref 7–30)
CALCIUM SERPL-MCNC: 8.5 MG/DL (ref 8.5–10.1)
CHLORIDE SERPL-SCNC: 109 MMOL/L (ref 94–109)
CO2 SERPL-SCNC: 28 MMOL/L (ref 20–32)
CREAT SERPL-MCNC: 0.56 MG/DL (ref 0.66–1.25)
DIFFERENTIAL METHOD BLD: ABNORMAL
EOSINOPHIL # BLD AUTO: 0 10E9/L (ref 0–0.7)
EOSINOPHIL NFR BLD AUTO: 1.8 %
ERYTHROCYTE [DISTWIDTH] IN BLOOD BY AUTOMATED COUNT: 17.7 % (ref 10–15)
GFR SERPL CREATININE-BSD FRML MDRD: >90 ML/MIN/{1.73_M2}
GLUCOSE SERPL-MCNC: 100 MG/DL (ref 70–99)
HCT VFR BLD AUTO: 24.9 % (ref 40–53)
HGB BLD-MCNC: 8.2 G/DL (ref 13.3–17.7)
LYMPHOCYTES # BLD AUTO: 0.8 10E9/L (ref 0.8–5.3)
LYMPHOCYTES NFR BLD AUTO: 36.3 %
MACROCYTES BLD QL SMEAR: PRESENT
MCH RBC QN AUTO: 31.5 PG (ref 26.5–33)
MCHC RBC AUTO-ENTMCNC: 32.9 G/DL (ref 31.5–36.5)
MCV RBC AUTO: 96 FL (ref 78–100)
METAMYELOCYTES # BLD: 0.1 10E9/L
METAMYELOCYTES NFR BLD MANUAL: 6.2 %
MICROCYTES BLD QL SMEAR: PRESENT
MONOCYTES # BLD AUTO: 0.4 10E9/L (ref 0–1.3)
MONOCYTES NFR BLD AUTO: 18.6 %
MYELOCYTES # BLD: 0.1 10E9/L
MYELOCYTES NFR BLD MANUAL: 3.5 %
NEUTROPHILS # BLD AUTO: 0.7 10E9/L (ref 1.6–8.3)
NEUTROPHILS NFR BLD AUTO: 32.7 %
NRBC # BLD AUTO: 0 10*3/UL
NRBC BLD AUTO-RTO: 1 /100
PLATELET # BLD AUTO: 403 10E9/L (ref 150–450)
PLATELET # BLD EST: ABNORMAL 10*3/UL
POIKILOCYTOSIS BLD QL SMEAR: SLIGHT
POLYCHROMASIA BLD QL SMEAR: SLIGHT
POTASSIUM SERPL-SCNC: 4 MMOL/L (ref 3.4–5.3)
PROT SERPL-MCNC: 6.4 G/DL (ref 6.8–8.8)
RBC # BLD AUTO: 2.6 10E12/L (ref 4.4–5.9)
RBC INCLUSIONS BLD: SLIGHT
SODIUM SERPL-SCNC: 141 MMOL/L (ref 133–144)
WBC # BLD AUTO: 2.2 10E9/L (ref 4–11)

## 2019-12-19 PROCEDURE — 25500064 ZZH RX 255 OP 636: Performed by: INTERNAL MEDICINE

## 2019-12-19 PROCEDURE — 40001011 ZZH STATISTIC PRE-PROCEDURE NURSING ASSESSMENT: Performed by: NURSE PRACTITIONER

## 2019-12-19 PROCEDURE — 25000128 H RX IP 250 OP 636

## 2019-12-19 PROCEDURE — 25000125 ZZHC RX 250: Performed by: INTERNAL MEDICINE

## 2019-12-19 PROCEDURE — 40000557 ZZH STATISTIC PORT-A-CATH ACCESS/FLUSHING

## 2019-12-19 PROCEDURE — 80053 COMPREHEN METABOLIC PANEL: CPT | Performed by: PEDIATRICS

## 2019-12-19 PROCEDURE — 71260 CT THORAX DX C+: CPT

## 2019-12-19 PROCEDURE — 40001071 ZZH STATISTICAL VASC ACCESS NURSE TIME, 1-15 MINUTES

## 2019-12-19 PROCEDURE — 36591 DRAW BLOOD OFF VENOUS DEVICE: CPT | Performed by: NURSE PRACTITIONER

## 2019-12-19 PROCEDURE — 74177 CT ABD & PELVIS W/CONTRAST: CPT

## 2019-12-19 PROCEDURE — 85025 COMPLETE CBC W/AUTO DIFF WBC: CPT | Performed by: PEDIATRICS

## 2019-12-19 PROCEDURE — 25000128 H RX IP 250 OP 636: Performed by: INTERNAL MEDICINE

## 2019-12-19 RX ORDER — HEPARIN SODIUM,PORCINE 10 UNIT/ML
5 VIAL (ML) INTRAVENOUS ONCE
Status: CANCELLED | OUTPATIENT
Start: 2019-12-19 | End: 2019-12-19

## 2019-12-19 RX ORDER — HEPARIN SODIUM (PORCINE) LOCK FLUSH IV SOLN 100 UNIT/ML 100 UNIT/ML
SOLUTION INTRAVENOUS
Status: COMPLETED
Start: 2019-12-19 | End: 2019-12-19

## 2019-12-19 RX ORDER — IOPAMIDOL 612 MG/ML
100 INJECTION, SOLUTION INTRAVASCULAR ONCE
Status: COMPLETED | OUTPATIENT
Start: 2019-12-19 | End: 2019-12-19

## 2019-12-19 RX ORDER — LIDOCAINE 40 MG/G
CREAM TOPICAL
Status: DISCONTINUED | OUTPATIENT
Start: 2019-12-19 | End: 2019-12-19 | Stop reason: HOSPADM

## 2019-12-19 RX ORDER — HEPARIN SODIUM,PORCINE 10 UNIT/ML
3 VIAL (ML) INTRAVENOUS ONCE
Status: COMPLETED | OUTPATIENT
Start: 2019-12-19 | End: 2019-12-19

## 2019-12-19 RX ADMIN — SODIUM CHLORIDE 50 ML: 9 INJECTION, SOLUTION INTRAVENOUS at 09:30

## 2019-12-19 RX ADMIN — IOPAMIDOL 98 ML: 612 INJECTION, SOLUTION INTRAVENOUS at 09:30

## 2019-12-19 RX ADMIN — HEPARIN 500 UNITS: 100 SYRINGE at 11:18

## 2019-12-19 RX ADMIN — SODIUM CHLORIDE, PRESERVATIVE FREE 5 ML: 5 INJECTION INTRAVENOUS at 09:37

## 2019-12-19 ASSESSMENT — MIFFLIN-ST. JEOR: SCORE: 1870.62

## 2019-12-19 NOTE — PROGRESS NOTES
Pt did not make counts.  Port hep locked and pt sent home.  Will return Monday for sedation apt again.

## 2019-12-20 ENCOUNTER — OFFICE VISIT (OUTPATIENT)
Dept: GASTROENTEROLOGY | Facility: CLINIC | Age: 19
End: 2019-12-20
Payer: MEDICAID

## 2019-12-20 VITALS
SYSTOLIC BLOOD PRESSURE: 121 MMHG | HEART RATE: 85 BPM | TEMPERATURE: 98.4 F | HEIGHT: 73 IN | RESPIRATION RATE: 18 BRPM | OXYGEN SATURATION: 99 % | BODY MASS INDEX: 23.72 KG/M2 | WEIGHT: 179 LBS | DIASTOLIC BLOOD PRESSURE: 69 MMHG

## 2019-12-20 DIAGNOSIS — K85.91 NECROTIZING PANCREATITIS: Primary | ICD-10-CM

## 2019-12-20 ASSESSMENT — MIFFLIN-ST. JEOR: SCORE: 1870.82

## 2019-12-20 ASSESSMENT — PAIN SCALES - GENERAL: PAINLEVEL: NO PAIN (0)

## 2019-12-20 NOTE — LETTER
12/20/2019       RE: Lazaro Lund  [address] [place] [address]     Dear Colleague,    Thank you for referring your patient, Lazaro Lund, to the [facility] at [facility]. Please see a copy of my visit note below.    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 His interval imaging suggests ongoing resolution of what are now small necrotic collections and no evidence of acute ongoing pancreatitis with preservation of the most if not all of the pancreatic parenchyma.  We will continue to follow these collections until complete resolution, however, we will do so utilizing the scans obtained to follow his lymphoma. We will ask that they add on the abdomen to their chest CTs. We have discussed the importance of avoiding tobacco and alcohol. Moreover, he will attempt to follow a healthy low fat low grease diet.    RTC as clinical course dictates    Thank you for this consultation.  It was a pleasure to participate in the care of this patient; please contact us with any further questions.  A total of 40 minutes was spent in face to face evaluation with this patient, >50% of which was counseling regarding the above delineated issues.    Herb Coronel MD PhD LOY DAVID  Director of Endoscopy  Associate Professor of Medicine, Surgery and Pediatrics  Interventional and Therapeutic Endoscopy    [facility]  Division of Gastroenterology and Hepatology  [address] [address]    New Consultations  [phone]  Procedure  Scheduling [phone]  Clinical Nurse Coordinator [phone]  Clinical Fax   [phone]  Administrative   [phone]  Administrative Fax  [phone]      -------------------------------------------------------------------------------------------------------------------  History of Presentation:    Mr Lund is a 19 year old gentleman with T cell lymphoblastic lymphoma who was found to have acute pancreatitis thought secondary to asparaginase and complicated by necrotic collections. His course was notable for abdominal pain, SIRS and hypotension requiring pressors, however he rapidly stabilized and imaging demonstrated a 3cm area of necrosis. He soon had resolution of pain and was able to tolerate an oral diet and was discharged.    In the interval he has been without fevers or much abdominal pain. He does note mild discomfort when near full. He denies change in bowel habit. His weight is stable.    He has no family history of pancreatic disease. He had no previous issues with pancreatic disease. He has smoked tobacco and does not drink.    Interval CT 12/19  Decreased size of walled off necrotic collections in the body and tail with resolution of acute findings including pleural effusions and pancreatic inflammation/free fluid    Review of systems:  A twelve point review was performed and was otherwise negative beyond what is mentioned in the history above.    Pertinent past medical history:  Past Medical History:   Diagnosis Date     Acute necrotizing pancreatitis 11/07/2019    attributed to asparaginase     DVT of upper extremity (deep vein thrombosis) (H) 09/26/2019    Bilateral      Edema of upper extremity 10/17/2019     Folliculitis 10/17/2019     Migraine 2006    have resolved     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       Previous surgeries:  Past Surgical History:   Procedure Laterality Date     BONE MARROW BIOPSY, BONE SPECIMEN, NEEDLE/TROCAR Left 9/1/2019    Procedure: BIOPSY, BONE MARROW;   Surgeon: Heather Lopez MD;  [facility]     INSERT PICC LINE N/A 8/31/2019    Procedure: INSERTION, PICC;  Surgeon: Michell Keith MD;  [facility]     INSERT PORT VASCULAR ACCESS N/A 10/24/2019    Procedure: INSERTION, VASCULAR ACCESS PORT;  Surgeon: Silviano Martins MD;  [facility] P[facility]      IR CHEST PORT PLACEMENT > 5 YRS OF AGE  10/24/2019     IR CHEST TUBE PLACEMENT NON-TUNNELLED LEFT  8/31/2019     IR PICC PLACEMENT > 5 YRS OF AGE  8/31/2019     IR PORT CHECK RIGHT  11/12/2019     SPINAL PUNCTURE,LUMBAR, INTRATHECAL CHEMO DELIVERY N/A 8/31/2019    Procedure: LUMBAR PUNCTURE, WITH INTRATHECAL CHEMOTHERAPY ADMINISTRATION;  Surgeon: Heather Lopez MD;  [facility]     SPINAL PUNCTURE,LUMBAR, INTRATHECAL CHEMO DELIVERY N/A 9/9/2019    Procedure: Lumbar Puncture With Intrathecal Chemo;  Surgeon: Alexi Hayes MD;  [facility]     SPINAL PUNCTURE,LUMBAR, INTRATHECAL CHEMO DELIVERY N/A 10/10/2019    Procedure: Lumbar puncture with IT Chemo (CD);  Surgeon: Reynaldo Campuzano MD;  [facility]      SPINAL PUNCTURE,LUMBAR, INTRATHECAL CHEMO DELIVERY N/A 10/17/2019    Procedure: Lumbar puncture with IT Chemo (not CD);  Surgeon: Reynaldo Campuzano MD;  [facility]      SPINAL PUNCTURE,LUMBAR, INTRATHECAL CHEMO DELIVERY N/A 10/24/2019    Procedure: LUMBAR PUNCTURE, WITH INTRATHECAL CHEMOTHERAPY ADMINISTRATION;  Surgeon: Félix Van, APRN CNP;  [facility]      SPINAL PUNCTURE,LUMBAR, INTRATHECAL CHEMO DELIVERY N/A 10/31/2019    Procedure: Lumbar puncture with IT Chemo (not CD);  Surgeon: Reynaldo Campuzano MD;  [facility]      THORACENTESIS N/A 8/31/2019    Procedure: Thoracentesis;  Surgeon: Michell Keith MD;  [facility]     Current mediations:  Current Outpatient Medications   Medication     acetaminophen (TYLENOL) 325 MG tablet     diphenhydrAMINE (BENADRYL) 25 MG capsule      enoxaparin ANTICOAGULANT (LOVENOX) 100 MG/ML syringe     melatonin 3 MG tablet     ondansetron (ZOFRAN-ODT) 8 MG ODT tab     oxyCODONE (ROXICODONE) 10 MG tablet     pantoprazole (PROTONIX) 40 MG EC tablet     polyethylene glycol (MIRALAX/GLYCOLAX) packet     scopolamine (TRANSDERM) 1 MG/3DAYS 72 hr patch     sennosides (SENOKOT) 8.6 MG tablet     sulfamethoxazole-trimethoprim (BACTRIM DS/SEPTRA DS) 800-160 MG tablet     Vitamin D, Cholecalciferol, 25 MCG (1000 UT) TABS     No current facility-administered medications for this visit.      Medications reviewed with patient today, see Medication List/Assessment for details.  No other NSAID/anticoagulation reported by patient.  No other OTC/herbal/supplements reported by patient.    Social history:  Social History     Socioeconomic History     Marital status: Single     Spouse name: Not on file     Number of children: Not on file     Years of education: Not on file     Highest education level: Not on file   Occupational History     Not on file   Social Needs     Financial resource strain: Not on file     Food insecurity:     Worry: Not on file     Inability: Not on file     Transportation needs:     Medical: Not on file     Non-medical: Not on file   Tobacco Use     Smoking status: Light Tobacco Smoker     Packs/day: 0.00     Types: Cigarettes     Smokeless tobacco: Never Used   Substance and Sexual Activity     Alcohol use: No     Drug use: No     Sexual activity: Never   Lifestyle     Physical activity:     Days per week: Not on file     Minutes per session: Not on file     Stress: Not on file   Relationships     Social connections:     Talks on phone: Not on file     Gets together: Not on file     Attends Roman [employer] service: Not on file     Active member of club or organization: Not on file     Attends meetings of clubs or organizations: Not on file     Relationship status: Not on file     Intimate partner violence:     Fear of current or ex partner: Not on file      Emotionally abused: Not on file     Physically abused: Not on file     Forced sexual activity: Not on file   Other Topics Concern     Not on file   Social History Narrative    Lives at home in [place] with Dad and Step-Mom. Biological mother is involved in his life and accompanied him during this hospitalization. Has 4 step-siblings and 1 biological sibling. 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 Has completed high school.        Family history:  Family History   Problem Relation Age of Onset     No Known Problems Mother      No Known Problems Father      Asthma Brother      Thyroid Cancer Paternal Grandmother      Melanoma Paternal Aunt      Family history reviewed and updated in EPIC  No colon/panc/other GI CA, no other HNPCC-related Jyoti.  No IBD/celiac, no AI/liver disease.    PHYSICAL EXAMINATION:  Vitals reviewed, AFVSS   Wt   Wt Readings from Last 2 Encounters:   12/19/19 81.5 kg (179 lb 10.8 oz)   12/12/19 78.4 kg (172 lb 13.5 oz)      Gen: nontoxic, aaox3, cooperative, pleasant, not dyspneic/diaphoretic  HEENT: neck supple, normal op w/o ulcer/exudate, anicteric  Resp/CV unremarkable without significant finding  Abd: benign, soft, nondistended, intact bs, no hepatosplenomegaly, nontender, no peritoneal signs  Ext: no c/c/e  Skin: warm, perfused, no jaundice  Neuro: grossly intact    Again, thank you for allowing me to participate in the care of your patient.      Sincerely,    Herb Coronel MD

## 2019-12-20 NOTE — PROGRESS NOTES
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 His interval imaging suggests ongoing resolution of what are now small necrotic collections and no evidence of acute ongoing pancreatitis with preservation of the most if not all of the pancreatic parenchyma.  We will continue to follow these collections until complete resolution, however, we will do so utilizing the scans obtained to follow his lymphoma. We will ask that they add on the abdomen to their chest CTs. We have discussed the importance of avoiding tobacco and alcohol. Moreover, he will attempt to follow a healthy low fat low grease diet.    RTC as clinical course dictates    Thank you for this consultation.  It was a pleasure to participate in the care of this patient; please contact us with any further questions.  A total of 40 minutes was spent in face to face evaluation with this patient, >50% of which was counseling regarding the above delineated issues.    Herb Coronel MD PhD FACG FRANKIE  Director of Endoscopy  Associate Professor of Medicine, Surgery and Pediatrics  Interventional and Therapeutic Endoscopy    [facility]  Division of Gastroenterology and Hepatology  [facility] [address]  [address] [id number]    New Consultations  [phone]  Procedure Scheduling [phone]  Clinical Nurse Coordinator [phone]  Clinical Fax   [phone]  Administrative   [phone]  Administrative Fax  [phone]      -------------------------------------------------------------------------------------------------------------------  History of Presentation:      Ashely is a 19 year old gentleman with T cell lymphoblastic lymphoma who was found to have acute pancreatitis thought secondary to asparaginase and complicated by necrotic collections. His course was notable for abdominal pain, SIRS and hypotension requiring pressors, however he rapidly stabilized and imaging demonstrated a 3cm area of necrosis. He soon had resolution of pain and was able to tolerate an oral diet and was discharged.    In the interval he has been without fevers or much abdominal pain. He does note mild discomfort when near full. He denies change in bowel habit. His weight is stable.    He has no family history of pancreatic disease. He had no previous issues with pancreatic disease. He has smoked tobacco and does not drink.    Interval CT 12/19  Decreased size of walled off necrotic collections in the body and tail with resolution of acute findings including pleural effusions and pancreatic inflammation/free fluid    Review of systems:  A twelve point review was performed and was otherwise negative beyond what is mentioned in the history above.    Pertinent past medical history:  Past Medical History:   Diagnosis Date     Acute necrotizing pancreatitis 11/07/2019    attributed to asparaginase     DVT of upper extremity (deep vein thrombosis) (H) 09/26/2019    Bilateral      Edema of upper extremity 10/17/2019     Folliculitis 10/17/2019     Migraine 2006    have resolved     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       Previous surgeries:  Past Surgical History:   Procedure Laterality Date     BONE MARROW BIOPSY, BONE SPECIMEN, NEEDLE/TROCAR Left 9/1/2019    Procedure: BIOPSY, BONE MARROW;  Surgeon: Heather Lopez MD;  [facility]     INSERT PICC LINE N/A 8/31/2019    Procedure: INSERTION, PICC;  Surgeon: Michell Keith MD;  [facility]     INSERT PORT VASCULAR ACCESS N/A 10/24/2019    Procedure: INSERTION, VASCULAR ACCESS PORT;  Surgeon: Silviano Martins MD;  [facility] PEDS  SEDATION      IR CHEST PORT PLACEMENT > 5 YRS OF AGE  10/24/2019     IR CHEST TUBE PLACEMENT NON-TUNNELLED LEFT  8/31/2019     IR PICC PLACEMENT > 5 YRS OF AGE  8/31/2019     IR PORT CHECK RIGHT  11/12/2019     SPINAL PUNCTURE,LUMBAR, INTRATHECAL CHEMO DELIVERY N/A 8/31/2019    Procedure: LUMBAR PUNCTURE, WITH INTRATHECAL CHEMOTHERAPY ADMINISTRATION;  Surgeon: Heather Lopez MD;  [facility]     SPINAL PUNCTURE,LUMBAR, INTRATHECAL CHEMO DELIVERY N/A 9/9/2019    Procedure: Lumbar Puncture With Intrathecal Chemo;  Surgeon: Alexi Hayes MD;  [facility]     SPINAL PUNCTURE,LUMBAR, INTRATHECAL CHEMO DELIVERY N/A 10/10/2019    Procedure: Lumbar puncture with IT Chemo (CD);  Surgeon: Reynaldo Campuzano MD;  [facility]      SPINAL PUNCTURE,LUMBAR, INTRATHECAL CHEMO DELIVERY N/A 10/17/2019    Procedure: Lumbar puncture with IT Chemo (not CD);  Surgeon: Reynaldo Campuzano MD;  [facility]      SPINAL PUNCTURE,LUMBAR, INTRATHECAL CHEMO DELIVERY N/A 10/24/2019    Procedure: LUMBAR PUNCTURE, WITH INTRATHECAL CHEMOTHERAPY ADMINISTRATION;  Surgeon: Félix Van, APRN CNP;  [facility]      SPINAL PUNCTURE,LUMBAR, INTRATHECAL CHEMO DELIVERY N/A 10/31/2019    Procedure: Lumbar puncture with IT Chemo (not CD);  Surgeon: Reynaldo Campuzano MD;  [facility]      THORACENTESIS N/A 8/31/2019    Procedure: Thoracentesis;  Surgeon: Michell Keith MD;  [facility]     Current mediations:  Current Outpatient Medications   Medication     acetaminophen (TYLENOL) 325 MG tablet     diphenhydrAMINE (BENADRYL) 25 MG capsule     enoxaparin ANTICOAGULANT (LOVENOX) 100 MG/ML syringe     melatonin 3 MG tablet     ondansetron (ZOFRAN-ODT) 8 MG ODT tab     oxyCODONE (ROXICODONE) 10 MG tablet     pantoprazole (PROTONIX) 40 MG EC tablet     polyethylene glycol (MIRALAX/GLYCOLAX) packet     scopolamine (TRANSDERM) 1 MG/3DAYS 72 hr patch      sennosides (SENOKOT) 8.6 MG tablet     sulfamethoxazole-trimethoprim (BACTRIM DS/SEPTRA DS) 800-160 MG tablet     Vitamin D, Cholecalciferol, 25 MCG (1000 UT) TABS     No current facility-administered medications for this visit.      Medications reviewed with patient today, see Medication List/Assessment for details.  No other NSAID/anticoagulation reported by patient.  No other OTC/herbal/supplements reported by patient.    Social history:  Social History     Socioeconomic History     Marital status: Single     Spouse name: Not on file     Number of children: Not on file     Years of education: Not on file     Highest education level: Not on file   Occupational History     Not on file   Social Needs     Financial resource strain: Not on file     Food insecurity:     Worry: Not on file     Inability: Not on file     Transportation needs:     Medical: Not on file     Non-medical: Not on file   Tobacco Use     Smoking status: Light Tobacco Smoker     Packs/day: 0.00     Types: Cigarettes     Smokeless tobacco: Never Used   Substance and Sexual Activity     Alcohol use: No     Drug use: No     Sexual activity: Never   Lifestyle     Physical activity:     Days per week: Not on file     Minutes per session: Not on file     Stress: Not on file   Relationships     Social connections:     Talks on phone: Not on file     Gets together: Not on file     Attends [employer] service: Not on file     Active member of club or organization: Not on file     Attends meetings of clubs or organizations: Not on file     Relationship status: Not on file     Intimate partner violence:     Fear of current or ex partner: Not on file     Emotionally abused: Not on file     Physically abused: Not on file     Forced sexual activity: Not on file   Other Topics Concern     Not on file   Social History Narrative    Lives at home in [place] with Dad and Step-Mom. Biological mother is involved in his life and accompanied him during this  hospitalization. Has 4 step-siblings and 1 biological sibling. 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 Has completed high school.        Family history:  Family History   Problem Relation Age of Onset     No Known Problems Mother      No Known Problems Father      Asthma Brother      Thyroid Cancer Paternal Grandmother      Melanoma Paternal Aunt      Family history reviewed and updated in EPIC  No colon/panc/other GI CA, no other HNPCC-related Jyoti.  No IBD/celiac, no AI/liver disease.    PHYSICAL EXAMINATION:  Vitals reviewed, AFVSS   Wt   Wt Readings from Last 2 Encounters:   12/19/19 81.5 kg (179 lb 10.8 oz)   12/12/19 78.4 kg (172 lb 13.5 oz)      Gen: nontoxic, aaox3, cooperative, pleasant, not dyspneic/diaphoretic  HEENT: neck supple, normal op w/o ulcer/exudate, anicteric  Resp/CV unremarkable without significant finding  Abd: benign, soft, nondistended, intact bs, no hepatosplenomegaly, nontender, no peritoneal signs  Ext: no c/c/e  Skin: warm, perfused, no jaundice  Neuro: grossly intact    Pertinent outside studies:      Answers for HPI/ROS submitted by the patient on 12/20/2019   General Symptoms: No  Skin Symptoms: No  HENT Symptoms: No  EYE SYMPTOMS: No  HEART SYMPTOMS: No  LUNG SYMPTOMS: No  INTESTINAL SYMPTOMS: No  URINARY SYMPTOMS: No  REPRODUCTIVE SYMPTOMS: No  SKELETAL SYMPTOMS: No  BLOOD SYMPTOMS: No  NERVOUS SYSTEM SYMPTOMS: No  MENTAL HEALTH SYMPTOMS: No

## 2019-12-20 NOTE — NURSING NOTE
"Chief Complaint   Patient presents with     Consult     Pt here for consult for pancreatitis       Vitals:    12/20/19 0803   BP: 121/69   BP Location: Left arm   Patient Position: Sitting   Cuff Size: Adult Regular   Pulse: 85   Resp: 18   Temp: 98.4  F (36.9  C)   TempSrc: Oral   SpO2: 99%   Weight: 81.2 kg (179 lb)   Height: 1.854 m (6' 1\")       Body mass index is 23.62 kg/m .      GLORIA BalesT                      "

## 2019-12-23 ENCOUNTER — MEDICAL CORRESPONDENCE (OUTPATIENT)
Dept: HEALTH INFORMATION MANAGEMENT | Facility: CLINIC | Age: 19
End: 2019-12-23

## 2019-12-23 ENCOUNTER — INFUSION THERAPY VISIT (OUTPATIENT)
Dept: INFUSION THERAPY | Facility: CLINIC | Age: 19
End: 2019-12-23
Attending: NURSE PRACTITIONER
Payer: MEDICAID

## 2019-12-23 ENCOUNTER — HOSPITAL ENCOUNTER (OUTPATIENT)
Facility: CLINIC | Age: 19
Discharge: HOME | End: 2019-12-23
Attending: RADIOLOGY | Admitting: PEDIATRICS
Payer: MEDICAID

## 2019-12-23 ENCOUNTER — ANESTHESIA EVENT (OUTPATIENT)
Dept: PEDIATRICS | Facility: CLINIC | Age: 19
End: 2019-12-23
Payer: MEDICAID

## 2019-12-23 ENCOUNTER — ANESTHESIA (OUTPATIENT)
Dept: PEDIATRICS | Facility: CLINIC | Age: 19
End: 2019-12-23
Payer: MEDICAID

## 2019-12-23 ENCOUNTER — OFFICE VISIT (OUTPATIENT)
Dept: PEDIATRIC HEMATOLOGY/ONCOLOGY | Facility: CLINIC | Age: 19
End: 2019-12-23
Attending: PEDIATRICS
Payer: MEDICAID

## 2019-12-23 VITALS
SYSTOLIC BLOOD PRESSURE: 119 MMHG | HEART RATE: 68 BPM | OXYGEN SATURATION: 99 % | WEIGHT: 181 LBS | RESPIRATION RATE: 18 BRPM | DIASTOLIC BLOOD PRESSURE: 66 MMHG | TEMPERATURE: 98.2 F | BODY MASS INDEX: 23.99 KG/M2 | HEIGHT: 73 IN

## 2019-12-23 VITALS
OXYGEN SATURATION: 97 % | RESPIRATION RATE: 16 BRPM | DIASTOLIC BLOOD PRESSURE: 78 MMHG | TEMPERATURE: 98.6 F | HEART RATE: 61 BPM | SYSTOLIC BLOOD PRESSURE: 100 MMHG

## 2019-12-23 DIAGNOSIS — C83.50 T LYMPHOBLASTIC LYMPHOMA (H): Primary | ICD-10-CM

## 2019-12-23 DIAGNOSIS — I82.623 ACUTE DEEP VEIN THROMBOSIS (DVT) OF OTHER VEIN OF BOTH UPPER EXTREMITIES (H): ICD-10-CM

## 2019-12-23 LAB
ALBUMIN SERPL-MCNC: 3.6 G/DL (ref 3.4–5)
ALP SERPL-CCNC: 73 U/L (ref 40–150)
ALT SERPL W P-5'-P-CCNC: 41 U/L (ref 0–70)
ANION GAP SERPL CALCULATED.3IONS-SCNC: 5 MMOL/L (ref 3–14)
AST SERPL W P-5'-P-CCNC: 17 U/L (ref 0–45)
BASOPHILS # BLD AUTO: 0 10E9/L (ref 0–0.2)
BASOPHILS NFR BLD AUTO: 0.8 %
BILIRUB SERPL-MCNC: 0.2 MG/DL (ref 0.2–1.3)
BUN SERPL-MCNC: 11 MG/DL (ref 7–30)
CALCIUM SERPL-MCNC: 8.2 MG/DL (ref 8.5–10.1)
CHLORIDE SERPL-SCNC: 110 MMOL/L (ref 94–109)
CO2 SERPL-SCNC: 27 MMOL/L (ref 20–32)
CREAT SERPL-MCNC: 0.5 MG/DL (ref 0.66–1.25)
DIFFERENTIAL METHOD BLD: ABNORMAL
EOSINOPHIL # BLD AUTO: 0 10E9/L (ref 0–0.7)
EOSINOPHIL NFR BLD AUTO: 0.4 %
ERYTHROCYTE [DISTWIDTH] IN BLOOD BY AUTOMATED COUNT: 19.6 % (ref 10–15)
GFR SERPL CREATININE-BSD FRML MDRD: >90 ML/MIN/{1.73_M2}
GLUCOSE SERPL-MCNC: 93 MG/DL (ref 70–99)
HCT VFR BLD AUTO: 27.9 % (ref 40–53)
HGB BLD-MCNC: 8.9 G/DL (ref 13.3–17.7)
IMM GRANULOCYTES # BLD: 0.1 10E9/L (ref 0–0.4)
IMM GRANULOCYTES NFR BLD: 1.9 %
LYMPHOCYTES # BLD AUTO: 0.7 10E9/L (ref 0.8–5.3)
LYMPHOCYTES NFR BLD AUTO: 24.9 %
MCH RBC QN AUTO: 31.6 PG (ref 26.5–33)
MCHC RBC AUTO-ENTMCNC: 31.9 G/DL (ref 31.5–36.5)
MCV RBC AUTO: 99 FL (ref 78–100)
MONOCYTES # BLD AUTO: 0.5 10E9/L (ref 0–1.3)
MONOCYTES NFR BLD AUTO: 20 %
NEUTROPHILS # BLD AUTO: 1.4 10E9/L (ref 1.6–8.3)
NEUTROPHILS NFR BLD AUTO: 52 %
NRBC # BLD AUTO: 0 10*3/UL
NRBC BLD AUTO-RTO: 0 /100
PLATELET # BLD AUTO: 423 10E9/L (ref 150–450)
POTASSIUM SERPL-SCNC: 3.8 MMOL/L (ref 3.4–5.3)
PROT SERPL-MCNC: 6.4 G/DL (ref 6.8–8.8)
RBC # BLD AUTO: 2.82 10E12/L (ref 4.4–5.9)
SODIUM SERPL-SCNC: 142 MMOL/L (ref 133–144)
WBC # BLD AUTO: 2.7 10E9/L (ref 4–11)

## 2019-12-23 PROCEDURE — 25800030 ZZH RX IP 258 OP 636: Mod: KQ,76 | Performed by: PEDIATRICS

## 2019-12-23 PROCEDURE — 25000128 H RX IP 250 OP 636: Mod: KQ,ZF | Performed by: NURSE PRACTITIONER

## 2019-12-23 PROCEDURE — 80053 COMPREHEN METABOLIC PANEL: CPT | Performed by: NURSE PRACTITIONER

## 2019-12-23 PROCEDURE — 25800030 ZZH RX IP 258 OP 636: Mod: KP,ZF | Performed by: PEDIATRICS

## 2019-12-23 PROCEDURE — 25000128 H RX IP 250 OP 636: Mod: ZF | Performed by: PEDIATRICS

## 2019-12-23 PROCEDURE — 89050 BODY FLUID CELL COUNT: CPT | Performed by: PEDIATRICS

## 2019-12-23 PROCEDURE — 25000125 ZZHC RX 250

## 2019-12-23 PROCEDURE — 40001011 ZZH STATISTIC PRE-PROCEDURE NURSING ASSESSMENT: Performed by: PEDIATRICS

## 2019-12-23 PROCEDURE — 25000125 ZZHC RX 250: Performed by: PEDIATRICS

## 2019-12-23 PROCEDURE — 36591 DRAW BLOOD OFF VENOUS DEVICE: CPT | Performed by: PEDIATRICS

## 2019-12-23 PROCEDURE — 25000128 H RX IP 250 OP 636: Mod: KP,ZF

## 2019-12-23 PROCEDURE — 25000125 ZZHC RX 250: Performed by: NURSE ANESTHETIST, CERTIFIED REGISTERED

## 2019-12-23 PROCEDURE — 25000128 H RX IP 250 OP 636: Mod: KQ

## 2019-12-23 PROCEDURE — 96366 THER/PROPH/DIAG IV INF ADDON: CPT | Performed by: PEDIATRICS

## 2019-12-23 PROCEDURE — 25000128 H RX IP 250 OP 636: Performed by: NURSE ANESTHETIST, CERTIFIED REGISTERED

## 2019-12-23 PROCEDURE — 96365 THER/PROPH/DIAG IV INF INIT: CPT | Performed by: PEDIATRICS

## 2019-12-23 PROCEDURE — 96411 CHEMO IV PUSH ADDL DRUG: CPT

## 2019-12-23 PROCEDURE — 25000128 H RX IP 250 OP 636: Mod: JW | Performed by: PEDIATRICS

## 2019-12-23 PROCEDURE — 37000008 ZZH ANESTHESIA TECHNICAL FEE, 1ST 30 MIN: Performed by: PEDIATRICS

## 2019-12-23 PROCEDURE — 25800030 ZZH RX IP 258 OP 636: Performed by: NURSE ANESTHETIST, CERTIFIED REGISTERED

## 2019-12-23 PROCEDURE — 96450 CHEMOTHERAPY INTO CNS: CPT | Performed by: NURSE PRACTITIONER

## 2019-12-23 PROCEDURE — 25800030 ZZH RX IP 258 OP 636: Mod: KQ,ZF | Performed by: NURSE PRACTITIONER

## 2019-12-23 PROCEDURE — 96413 CHEMO IV INFUSION 1 HR: CPT

## 2019-12-23 PROCEDURE — 40000165 ZZH STATISTIC POST-PROCEDURE RECOVERY CARE: Performed by: PEDIATRICS

## 2019-12-23 PROCEDURE — 25800030 ZZH RX IP 258 OP 636: Performed by: PEDIATRICS

## 2019-12-23 PROCEDURE — 85025 COMPLETE CBC W/AUTO DIFF WBC: CPT | Performed by: NURSE PRACTITIONER

## 2019-12-23 RX ORDER — LIDOCAINE 40 MG/G
2.5 CREAM TOPICAL
Status: DISCONTINUED | OUTPATIENT
Start: 2019-12-23 | End: 2019-12-23 | Stop reason: HOSPADM

## 2019-12-23 RX ORDER — LIDOCAINE HYDROCHLORIDE 20 MG/ML
INJECTION, SOLUTION INFILTRATION; PERINEURAL PRN
Status: DISCONTINUED | OUTPATIENT
Start: 2019-12-23 | End: 2019-12-23

## 2019-12-23 RX ORDER — ONDANSETRON 2 MG/ML
INJECTION INTRAMUSCULAR; INTRAVENOUS PRN
Status: DISCONTINUED | OUTPATIENT
Start: 2019-12-23 | End: 2019-12-23

## 2019-12-23 RX ORDER — HEPARIN SODIUM (PORCINE) LOCK FLUSH IV SOLN 100 UNIT/ML 100 UNIT/ML
5 SOLUTION INTRAVENOUS
Status: DISCONTINUED | OUTPATIENT
Start: 2019-12-23 | End: 2019-12-23 | Stop reason: HOSPADM

## 2019-12-23 RX ORDER — LIDOCAINE 40 MG/G
2.5 CREAM TOPICAL
Status: COMPLETED | OUTPATIENT
Start: 2019-12-23 | End: 2019-12-23

## 2019-12-23 RX ORDER — SODIUM CHLORIDE, SODIUM LACTATE, POTASSIUM CHLORIDE, CALCIUM CHLORIDE 600; 310; 30; 20 MG/100ML; MG/100ML; MG/100ML; MG/100ML
INJECTION, SOLUTION INTRAVENOUS CONTINUOUS PRN
Status: DISCONTINUED | OUTPATIENT
Start: 2019-12-23 | End: 2019-12-23

## 2019-12-23 RX ORDER — HEPARIN SODIUM,PORCINE 10 UNIT/ML
5 VIAL (ML) INTRAVENOUS ONCE
Status: COMPLETED | OUTPATIENT
Start: 2019-12-23 | End: 2019-12-23

## 2019-12-23 RX ORDER — HEPARIN SODIUM,PORCINE 10 UNIT/ML
VIAL (ML) INTRAVENOUS
Status: COMPLETED
Start: 2019-12-23 | End: 2019-12-23

## 2019-12-23 RX ORDER — PROPOFOL 10 MG/ML
INJECTION, EMULSION INTRAVENOUS CONTINUOUS PRN
Status: DISCONTINUED | OUTPATIENT
Start: 2019-12-23 | End: 2019-12-23

## 2019-12-23 RX ORDER — PROPOFOL 10 MG/ML
INJECTION, EMULSION INTRAVENOUS PRN
Status: DISCONTINUED | OUTPATIENT
Start: 2019-12-23 | End: 2019-12-23

## 2019-12-23 RX ORDER — LIDOCAINE 40 MG/G
CREAM TOPICAL
Status: COMPLETED
Start: 2019-12-23 | End: 2019-12-23

## 2019-12-23 RX ORDER — CAFFEINE AND SODIUM BENZOATE 125 MG/ML
500 INJECTION, SOLUTION INTRAMUSCULAR; INTRAVENOUS ONCE
Status: DISCONTINUED | OUTPATIENT
Start: 2019-12-23 | End: 2019-12-23

## 2019-12-23 RX ORDER — HEPARIN SODIUM (PORCINE) LOCK FLUSH IV SOLN 100 UNIT/ML 100 UNIT/ML
SOLUTION INTRAVENOUS
Status: COMPLETED
Start: 2019-12-23 | End: 2019-12-23

## 2019-12-23 RX ADMIN — METHOTREXATE 200 MG: 25 INJECTION, SOLUTION INTRA-ARTERIAL; INTRAMUSCULAR; INTRATHECAL; INTRAVENOUS at 14:44

## 2019-12-23 RX ADMIN — Medication 5 ML: at 13:10

## 2019-12-23 RX ADMIN — HEPARIN, PORCINE (PF) 10 UNIT/ML INTRAVENOUS SYRINGE 5 ML: at 13:10

## 2019-12-23 RX ADMIN — LIDOCAINE HYDROCHLORIDE 40 MG: 20 INJECTION, SOLUTION INFILTRATION; PERINEURAL at 11:55

## 2019-12-23 RX ADMIN — HEPARIN SODIUM (PORCINE) LOCK FLUSH IV SOLN 100 UNIT/ML 5 ML: 100 SOLUTION at 15:07

## 2019-12-23 RX ADMIN — SODIUM CHLORIDE 50 ML: 9 INJECTION, SOLUTION INTRAVENOUS at 14:30

## 2019-12-23 RX ADMIN — LIDOCAINE 2.5 G: 40 CREAM TOPICAL at 11:20

## 2019-12-23 RX ADMIN — SODIUM CHLORIDE, PRESERVATIVE FREE 5 ML: 5 INJECTION INTRAVENOUS at 15:07

## 2019-12-23 RX ADMIN — SODIUM CHLORIDE, POTASSIUM CHLORIDE, SODIUM LACTATE AND CALCIUM CHLORIDE: 600; 310; 30; 20 INJECTION, SOLUTION INTRAVENOUS at 11:55

## 2019-12-23 RX ADMIN — PROPOFOL 30 MG: 10 INJECTION, EMULSION INTRAVENOUS at 12:00

## 2019-12-23 RX ADMIN — VINCRISTINE SULFATE 2 MG: 1 INJECTION, SOLUTION INTRAVENOUS at 14:30

## 2019-12-23 RX ADMIN — PROPOFOL 20 MG: 10 INJECTION, EMULSION INTRAVENOUS at 11:57

## 2019-12-23 RX ADMIN — CAFFEINE AND SODIUM BENZOATE: 125 INJECTION, SOLUTION INTRAMUSCULAR; INTRAVENOUS at 12:00

## 2019-12-23 RX ADMIN — PROPOFOL 300 MCG/KG/MIN: 10 INJECTION, EMULSION INTRAVENOUS at 11:55

## 2019-12-23 RX ADMIN — SODIUM CHLORIDE: 9 INJECTION INTRAMUSCULAR; INTRAVENOUS; SUBCUTANEOUS at 12:04

## 2019-12-23 RX ADMIN — DEXTROSE MONOHYDRATE 50 ML: 50 INJECTION, SOLUTION INTRAVENOUS at 14:30

## 2019-12-23 RX ADMIN — ONDANSETRON 4 MG: 2 INJECTION INTRAMUSCULAR; INTRAVENOUS at 11:55

## 2019-12-23 RX ADMIN — PROPOFOL 50 MG: 10 INJECTION, EMULSION INTRAVENOUS at 11:55

## 2019-12-23 ASSESSMENT — MIFFLIN-ST. JEOR: SCORE: 1872.26

## 2019-12-23 ASSESSMENT — ENCOUNTER SYMPTOMS: APNEA: 0

## 2019-12-23 NOTE — ANESTHESIA CARE TRANSFER NOTE
Patient: Lazaro Lund    Procedure(s):  Lumbar puncture with IT Chem (CD)    Diagnosis: lymphoma  Diagnosis Additional Information: No value filed.    Anesthesia Type:   General     Note:  Airway :Nasal Cannula  Patient transferred to: Recovery  Comments: Strong SV, VSS. Report to RN.  Handoff Report: Identifed the Patient, Identified the Reponsible Provider, Reviewed the pertinent medical history, Discussed the surgical course, Reviewed Intra-OP anesthesia mangement and issues during anesthesia, Set expectations for post-procedure period and Allowed opportunity for questions and acknowledgement of understanding      Vitals: (Last set prior to Anesthesia Care Transfer)    CRNA VITALS  12/23/2019 1141 - 12/23/2019 1212      12/23/2019             Temp:  36.4  C (97.5  F)                Electronically Signed By: YOHAN Wadsworth CRNA  December 23, 2019  12:12 PM

## 2019-12-23 NOTE — PROGRESS NOTES
Infusion Nursing Note    Lazaro PLUNKETT Kalerobbin Presents to [facility] infusion [place] today for: C1D1 VCR/MTX    Due to : T lymphoblastic lymphoma (H)    Patient seen by Provider : Yes: Dr. Lopez and Dr. Rowan     present during visit today: Not Applicable    Note: Pt arrived to the [facility] from Peds Sedation after an LP.     Intravenous Access: Yes: Port, previously accessed in Peds Sedation. Blood return noted.    Treatment conditions: Platelet and ANC    Parameters Met for treatment    Pre-Meds:No    Education provided: Yes: about VCR/MTX    Post Infusion Assessment: Vincristine given to gravity; blood return noted pre/mid/post infusion. Methotrexate infusion completed without complication; blood return noted pre/post infusion. VSS. Port heparin locked and de-accessed.     Discharge Plan:   Refills given for Scolpolamine patches.   Patient verbalized understanding of discharge instructions, all questions answered. Patient left clinic accompanied by other: Grandmother

## 2019-12-23 NOTE — DISCHARGE INSTRUCTIONS
Home Instructions for Your Child after Sedation  Today your child received (medicine):  Propofol and Zofran  Please keep this form with your health records  Your child may be more sleepy and irritable today than normal. An adult should stay with your child for the rest of the day. The medicine may make the child dizzy. Avoid activities that require balance (bike riding, skating, climbing stairs, walking).  Remember:    No Driving for 24 hours    When your child wants to eat again, start with liquids (juice, soda pop, Popsicles). If your child feels well enough, you may try a regular diet. It is best to offer light meals for the first 24 hours.    If your child has nausea (feels sick to the stomach) or vomiting (throws up), give small amounts of clear liquids (7-Up, Sprite, apple juice or broth). Fluids are more important than food until your child is feeling better.    Wait 24 hours before giving medicine that contains alcohol. This includes liquid cold, cough and allergy medicines (Robitussin, Vicks Formula 44 for children, Benadryl, Chlor-Trimeton).  Call your doctor if:    You have questions about the test results.    Your child vomits (throws up) more than two times.    Your child is very fussy or irritable.    You have trouble waking your child.     If your child has trouble breathing, call 911.  If you have any questions or concerns, please call:  Pediatric Sedation Unit [phone]  Pediatric clinic  [phone]  [facility]  [phone] (ask for the peds heme / oncology doctor on call)  Emergency department [phone]  [facility] toll-free number [phone] (Monday--Friday, 8 a.m. to 4:30 p.m.)  I understand these instructions. I have all of my personal belongings.        [facility]   [phone]    Care post Lumbar Puncture     Do not remove bandage/dressing for 24 hours -- after this time they can be removed    No bath, shower or soaking of the dressing for 24 hours    Activity  as tolerated by the patient    Diet as able to tolerated    May use Tylenol as needed for pain control -- DO NOT use Ibuprofen    Can apply icepack to the site for discomfort -- no more than 10 minutes at a time    If bleeding presents apply pressure for 5 minutes    Call [phone] ask for Peds BMT/Hem/Onc fellow on call if complications arise including:    persistent bleeding    fever greater than 100.5    Pain    Lumbar punctures can cause headache. If the pain is not controlled with Tylenol (acetaminophen) please call the Peds BMT/Hem/Onc fellow on call

## 2019-12-23 NOTE — LETTER
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 Lazaro presented with acute onset cough and was found to have an anterior mediastinal mass and malignant left-sided pleural effusion.  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  He was admitted to [facility] oncology service 8/30 and started on treatment per [id number].  He had a pleural effusion that required a chest tube and a pericardial effusion that was not drained. His Induction was complicated by cardiac compression secondary to his mass leading to tamponade, this improved through out his hospital stay.  He also had dysphagia that improved with treatment of his mass, swallow study was normal. His course was recently complicated by extensive bilateral UE DVTs, he remains on anticoagulation.  Most recently his course was complicated by the development of severe asparaginase induced pancreatitis. He became quite ill with SIRS and associated hypotension, he required pressor support in the ICU.  Lazaro also had severe pain that was initially managed with a PCA and transitioned to oral prior to discharge.  He comes to clinic today with his Grandma to start Interim Maintenance #1.    HPI:   Lazaro states he had a nice weekend. He was a little tired but was able to relax. He went to his other grandma's house for early jose eduardo celebrations and it was fun. He denies any pain today and notes that his appetite is good. He denies any nausea or vomiting. He has good stools and denies any numbness or sharp shooting pains in his toes or fingers. Denies rash. No fevers or illnesses. He is injecting the lovenox himself and alternates thighs. He states that its not too bad and he is used to it. He did hold his lovenox dose for 24 hours in preparation for today's LP  and states his last lovenox was yesterday morning. He had an appointment with GI recently and was advised to stick to a low fat and low grease diet which he is doing and things he can stick to.     Review of systems:  Remainder of ROS is complete and negative.    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  He is looking forward to [place].     Current Medications:  Current Outpatient Medications on File Prior to Visit   Medication Sig Dispense Refill     acetaminophen (TYLENOL) 325 MG tablet Take 2 tablets (650 mg) by mouth every 6 hours as needed for mild pain 60 tablet 0     diphenhydrAMINE (BENADRYL) 25 MG capsule Take 1-2 capsules (25-50 mg) by mouth every 6 hours as needed (Breakthrough Nausea and Vomiting ) 30 capsule 1     enoxaparin ANTICOAGULANT (LOVENOX) 100 MG/ML syringe Inject 1 mL (100 mg) Subcutaneous every 12 hours 180 mL 0     melatonin 3 MG tablet Take 1 tablet (3 mg) by mouth At Bedtime 30 tablet 1     ondansetron (ZOFRAN-ODT) 8 MG ODT tab Take 1 tablet (8 mg) by mouth every 6 hours as needed for nausea 20 tablet 0     oxyCODONE (ROXICODONE) 10 MG tablet Take 1 tablet (10 mg) by mouth every 4 hours as needed for severe  "pain 42 tablet 0     pantoprazole (PROTONIX) 40 MG EC tablet Take 1 tablet (40 mg) by mouth every morning (before breakfast) 30 tablet 0     polyethylene glycol (MIRALAX/GLYCOLAX) packet Take 17 g by mouth daily 30 packet 0     scopolamine (TRANSDERM) 1 MG/3DAYS 72 hr patch Place 1 patch onto the skin every 72 hours 10 patch 3     sennosides (SENOKOT) 8.6 MG tablet Take 2 tablets by mouth 2 times daily as needed for constipation 60 each 1     sulfamethoxazole-trimethoprim (BACTRIM DS/SEPTRA DS) 800-160 MG tablet Take 1 tablet by mouth Every Mon, Tues two times daily 16 tablet 3     Vitamin D, Cholecalciferol, 25 MCG (1000 UT) TABS Take 2 tablets (2,000 Units) by mouth daily 60 tablet 3   Lazaro reports he is taking bactrim and lovenox regularly although lovenox has been on hold for procedure today.    Received influenza vaccine for the 2057-1729 season.      Physical Exam:   Temp:  [97.4  F (36.3  C)-98.6  F (37  C)] 98.6  F (37  C)  Pulse:  [58-81] 61  Heart Rate:  [58-68] 68  Resp:  [13-18] 16  BP: ()/(34-78) 100/78  SpO2:  [97 %-100 %] 97 %  Wt Readings from Last 4 Encounters:   12/23/19 82.1 kg (181 lb)   12/20/19 81.2 kg (179 lb)   12/19/19 81.5 kg (179 lb 10.8 oz)   12/12/19 78.4 kg (172 lb 13.5 oz)     Ht Readings from Last 2 Encounters:   12/23/19 1.842 m (6' 0.52\")   12/20/19 1.854 m (6' 1\")     Weight at diagnosis was 75.2kg,   General: Lazaro is alert, interactive and appropriate. He appears well, is upbeat and in no obvious pain. Cushingoid facies.  HEENT: Skull is atrauamatic and normocephalic.  Full head of hair. PERRLA, sclera are non icteric and not injected, EOM are intact, gaze slightly disconjugate which is his baseline. Nares are patent without drainage. Oropharynx clear, no plaques noted. Buccal mucosa and tongue clear. MMM. Tympanic membranes are opaque bilaterally with light reflex and landmarks present.  Voice no longer hoarse.  Lymph:  Neck is supple without lymphadenopathy.  There is " no supraclavicular, axillary or inguinal lymphadenopathy palpated.  Cardiovascular:  HR is regular, S1, S2 no murmur.  Capillary refill is < 2 seconds. Right arm without edema or erythema.  No pitting edema.  Cap refill < 2 sec. Peripheral pulses 2+.   Respiratory: No cough noted. Respirations are easy.  Lungs are clear to auscultation through out.  No crackles or wheezes.  Gastrointestinal:  BS present in all quadrants.  Abdomen is soft and flat.  Lazaro denies LUQ discomfort, no pain with palpation. No hepatosplenomegaly or masses are palpated.  Skin: Scattered papules on his forehead. No rashes, bruises or other skin lesions noted.  Neurological:  CN 2-12 grossly intact. Gait is normal.  No issues with balance. Sensation intact in hands and feet.     Musculoskeletal:  Good strength and ROM in all extremities.  Strong dorsiflexion at ankles and great toes (5/5) bilaterally without any pain at the Achilles.    Labs:   Results for orders placed or performed during the hospital encounter of 12/23/19   CBC with platelets differential     Status: Abnormal   Result Value Ref Range    WBC 2.7 (L) 4.0 - 11.0 10e9/L    RBC Count 2.82 (L) 4.4 - 5.9 10e12/L    Hemoglobin 8.9 (L) 13.3 - 17.7 g/dL    Hematocrit 27.9 (L) 40.0 - 53.0 %    MCV 99 78 - 100 fl    MCH 31.6 26.5 - 33.0 pg    MCHC 31.9 31.5 - 36.5 g/dL    RDW 19.6 (H) 10.0 - 15.0 %    Platelet Count 423 150 - 450 10e9/L    Diff Method Automated Method     % Neutrophils 52.0 %    % Lymphocytes 24.9 %    % Monocytes 20.0 %    % Eosinophils 0.4 %    % Basophils 0.8 %    % Immature Granulocytes 1.9 %    Nucleated RBCs 0 0 /100    Absolute Neutrophil 1.4 (L) 1.6 - 8.3 10e9/L    Absolute Lymphocytes 0.7 (L) 0.8 - 5.3 10e9/L    Absolute Monocytes 0.5 0.0 - 1.3 10e9/L    Absolute Eosinophils 0.0 0.0 - 0.7 10e9/L    Absolute Basophils 0.0 0.0 - 0.2 10e9/L    Abs Immature Granulocytes 0.1 0 - 0.4 10e9/L    Absolute Nucleated RBC 0.0    Comprehensive metabolic panel     Status:  Abnormal   Result Value Ref Range    Sodium 142 133 - 144 mmol/L    Potassium 3.8 3.4 - 5.3 mmol/L    Chloride 110 (H) 94 - 109 mmol/L    Carbon Dioxide 27 20 - 32 mmol/L    Anion Gap 5 3 - 14 mmol/L    Glucose 93 70 - 99 mg/dL    Urea Nitrogen 11 7 - 30 mg/dL    Creatinine 0.50 (L) 0.66 - 1.25 mg/dL    GFR Estimate >90 >60 mL/min/[1.73_m2]    GFR Estimate If Black >90 >60 mL/min/[1.73_m2]    Calcium 8.2 (L) 8.5 - 10.1 mg/dL    Bilirubin Total 0.2 0.2 - 1.3 mg/dL    Albumin 3.6 3.4 - 5.0 g/dL    Protein Total 6.4 (L) 6.8 - 8.8 g/dL    Alkaline Phosphatase 73 40 - 150 U/L    ALT 41 0 - 70 U/L    AST 17 0 - 45 U/L   Cell count with differential CSF:     Status: None   Result Value Ref Range    WBC CSF 1 0 - 5 /uL    RBC CSF 0 0 - 2 /uL    Tube Number 2 #    Color CSF Colorless CLRL^Colorless    Appearance CSF Clear CLER^Clear     Recent Results (from the past 24 hour(s))   CT Chest/Abdomen/Pelvis w Contrast    Narrative    EXAMINATION: CT CHEST/ABDOMEN/PELVIS W CONTRAST, 12/19/2019 9:38 AM    TECHNIQUE:  Helical CT images from the thoracic inlet through the  symphysis pubis were obtained  with contrast. Contrast dose: 98ml's  isovue 300    COMPARISON: MRI 11/25/2019. CT 11/10/2019, 10/1/2019, 9/27/2019.    HISTORY: necrotizing pancreatitis, lymphoma, eval pleural effusion;  Drug-induced acute pancreatitis with uninfected necrosis; T  lymphoblastic lymphoma (H)    FINDINGS:  Chest: Right chest Port-A-Cath with tip terminating at the cavoatrial  junction. 1.1 cm left retropectoral lymph node, previous measured 1.2  cm.  No axillary lymphadenopathy. The visualized thyroid is grossly  unremarkable. Heart is normal in size. Soft tissue density anterior to  the mediastinum, consistent with thymic tissue. There is a small  pericardial effusion. Normal configuration and diameter of the great  vessels. The ascending aorta and the main pulmonary artery are  nonaneurysmal. Prominent lymph node in the right hilum measuring 10  mm  (series 2, image 25), previously measured 8 mm. Scattered mediastinal  lymph nodes without suspicious. Thoracic esophagus is unremarkable.  The central tracheal bronchial tree is patent.    There is no pleural effusion. No pneumothorax. Trace biapical  scarring. No focal airspace consolidation.    Abdomen and pelvis: Liver, spleen, adrenal glands, and gallbladder are  unremarkable. The renal parenchyma is unremarkable. Extrarenal pelvis  bilaterally. There are fluid collections in the tail and body of the  pancreas which are overall decreased compared to prior MRCP. Walled  off necrosis within the mid body of the pancreas measures 1.3 x 1.6 cm  (series 2, image 67) and 1.3 x 1.5 cm within the tail of the pancreas  (series 2, image 61). No splenic vein thrombosis or pseudoaneurysms  noted. Retroaortic left renal vein.    Urinary bladder is partially distended and grossly unremarkable.  Symmetric seminal vesicles. Small prostatic calcification, present is  otherwise unremarkable. No free fluid or free air within the abdomen  or pelvis. Small umbilical hernia. No dilated small or large bowel. No  lymphadenopathy within the abdomen and pelvis.    Bones and soft tissues: Asymmetric gynecomastia, right greater than  left. Small bone islands noted. No suspicious osseous abdomen.       Impression    IMPRESSION:   1. Sequela of necrotizing pancreatitis with walled off necrotic  collections in the body and tail, decreased from the prior MRCP. No  additional sequela of pancreatitis appreciated and there is no  evidence of acute inflammation.  2. Resolution of previously noted pleural effusions. No focal  pulmonary disease.  3. Scattered lymph nodes within the chest, likely reactive. No  suspicious adenopathy.    I have personally reviewed the examination and initial interpretation  and I agree with the findings.    SHENA ORTIZ MD       Procedure:  A Lumbar Puncture was performed in the Pediatric Sedation Suite.  Informed consent was obtained prior to the procedure. Lazaro Lund was identified by facial recognition and ID arm band. A time-out was performed. Lazaro Lund was then placed in the left lateral decubitus position and the lumbosacral area was sterily prepped using Chloraprep followed by drape placement. Anatomic landmarks were identified by palpation. Then, a 22 gauge, 3.5 inch spinal needle was easily inserted into the L4/L5 interspace. On the first attempt approximately 3 mL of clear and colorless cerebrospinal fluid was obtained to be sent to the lab for cell count analysis and cytospin. Following that, 15 mg of intrathecal Methotrexate in 6 mL of preservative-free normal saline was infused without resistance. The needle was removed and a Band-Aid applied. Lazaro Lund tolerated this procedure very well.     Assessment:  Lazaro Lund is a 19 year old young man with T cell lymphoblastic lymphoma (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  He is being treated per [facility] protocol [id number].   He's had a CRu, resolution of lymphadenopathy however a small pleural effusion persists. His course has been complicated by extensive bilateral DVT and most recently severe pancreatitis.  He is tolerating a low fat low grease oral diet and his pancreatitis related pain has improved, he is not requiring any pain medications. Recent MRCP showed necrotizing pancreatitis and possible discontinuity of the pancreatic duct.  Asparaginase will be permanently discontinued from his treatment regimen.  He is on lovenox for anticoagulation. 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 Recent Anti-Xa was therapeutic. He is on Vit D for deficiency.  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  He comes to clinic today to start Interim Maintenance #1.    Plan:   1) CBCd with plts>75k and ANC>750 therefore meets criteria to begin IM#1. No need for transfusions today  2) Lazaro received IV caffeine after his LP  for known spinal headaches   3) Refill Lovenox as requested  4) Continue lovenox at current dose, recent level in goal range of 0.6-1.  Will check monthly (due early January).  He is aware of the need to hold PM prior and AM of his LPs. Will maintain platelet count > 30K while on anticoagulation.  Recent U/S stable, minor venous distention not surprising given the extent of his known thrombus, no other symptoms to suggest new acute thrombus. Plan to repeat U/S in late January.  5) Continue Vit D 3 2000IU daily, recheck level in 2 months.  6) Day 29 Induction LP deferred, original plan was to make up on Day 29 of Consolidation however given his recent complications I will defer this to Maintenance therapy.  7) Given recent pancreatitis and intermediate TPMT phenotype his 6MP dose was reduced for the 2nd half of Consolidation by 50%.  Will obtain TPMT genotype once that testing becomes available again.   8)  RTC Monday to proceed with IM#1 pending counts.    Patient seen and examined with Pediatric Hematology/Oncology Attending Dr. John Rowan MD  Pediatric Hematology/Oncology & BMT Fellow    I personally saw and examined the patient with the Fellow, Dr. Rowan as above.  I personally reviewed the laboratory and imaging results as above. I agree with the assessment and plan as above.  I personally was present and supervised the entire spinal tap procedure and chemotherapy administration as documented above.    Heather Lopez, MSc, MD  Pediatric Oncology

## 2019-12-23 NOTE — ANESTHESIA POSTPROCEDURE EVALUATION
Anesthesia POST Procedure Evaluation    Patient: Lazaro Ludn   MRN:     [id number] Gender:   male   Age:    19 year old :      2000        Preoperative Diagnosis: lymphoma   Procedure(s):  Lumbar puncture with IT Chem (CD)   Postop Comments: No value filed.       Anesthesia Type:  Not documented  General    Reportable Event: NO     PAIN: Uncomplicated   Sign Out status: Comfortable, Well controlled pain     PONV: No PONV   Sign Out status:  No Nausea or Vomiting     Neuro/Psych: Uneventful perioperative course   Sign Out Status: Preoperative baseline; Age appropriate mentation     Airway/Resp.: Uneventful perioperative course   Sign Out Status: Non labored breathing, age appropriate RR; Resp. Status within EXPECTED Parameters     CV: Uneventful perioperative course   Sign Out status: Appropriate BP and perfusion indices; Appropriate HR/Rhythm     Disposition:   Sign Out in:  PACU  Disposition:  Phase II; Home  Recovery Course: Uneventful  Follow-Up: Not required           Last Anesthesia Record Vitals:  CRNA VITALS  2019 1141 - 2019 1241      2019             Temp:  36.4  C (97.5  F)          Last PACU Vitals:  Vitals Value Taken Time   BP 96/51 2019 12:30 PM   Temp 36.3  C (97.4  F) 2019 12:30 PM   Pulse 61 2019 12:30 PM   Resp 15 2019 12:30 PM   SpO2 98 % 2019 12:30 PM   Temp src     NIBP     Pulse 72 2019 12:08 PM   SpO2 99 % 2019 12:08 PM   Resp     Temp 36.4  C (97.5  F) 2019 12:11 PM   Ht Rate     Temp 2           Electronically Signed By: Jyoti Martinez MD, 2019, 1:04 PM

## 2019-12-23 NOTE — ANESTHESIA PREPROCEDURE EVALUATION
Anesthesia Pre-Procedure Evaluation    Patient: Lazaro Lund   MRN:     [id number] Gender:   male   Age:    19 year old :      2000        Preoperative Diagnosis: lymphoma   Procedure(s):  Lumbar puncture with IT Chemo (not CD)     Past Medical History:   Diagnosis Date     Acute necrotizing pancreatitis 2019    attributed to asparaginase     DVT of upper extremity (deep vein thrombosis) (H) 2019    Bilateral      Edema of upper extremity 10/17/2019     Folliculitis 10/17/2019     Migraine 2006    have resolved     T lymphoblastic lymphoma (H) 2019      Past Surgical History:   Procedure Laterality Date     BONE MARROW BIOPSY, BONE SPECIMEN, NEEDLE/TROCAR Left 2019    Procedure: BIOPSY, BONE MARROW;  Surgeon: Heather Lopez MD;  Location: [facility] OR     INSERT PICC LINE N/A 2019    Procedure: INSERTION, PICC;  Surgeon: Michell Keith MD;  [facility]: [facility] OR     INSERT PORT VASCULAR ACCESS N/A 10/24/2019    Procedure: INSERTION, VASCULAR ACCESS PORT;  Surgeon: Silviano Martins MD;  [facility]: [facility] P[facility]S [facility]      IR CHEST PORT PLACEMENT > 5 YRS OF AGE  10/24/2019     IR CHEST TUBE PLACEMENT NON-TUNNELLED LEFT  2019     IR PICC PLACEMENT > 5 YRS OF AGE  2019     IR PORT CHECK RIGHT  2019     SPINAL PUNCTURE,LUMBAR, INTRATHECAL CHEMO DELIVERY N/A 2019    Procedure: LUMBAR PUNCTURE, WITH INTRATHECAL CHEMOTHERAPY ADMINISTRATION;  Surgeon: Heather Lopez MD;  [facility] OR     SPINAL PUNCTURE,LUMBAR, INTRATHECAL CHEMO DELIVERY N/A 2019    Procedure: Lumbar Puncture With Intrathecal Chemo;  Surgeon: Alexi Hayes MD;  [facility]     SPINAL PUNCTURE,LUMBAR, INTRATHECAL CHEMO DELIVERY N/A 10/10/2019    Procedure: Lumbar puncture with IT Chemo (CD);  Surgeon: Reynaldo Campuzano MD;  [facility]      SPINAL PUNCTURE,LUMBAR, INTRATHECAL CHEMO DELIVERY N/A 10/17/2019    Procedure: Lumbar puncture with IT Chemo (not  CD);  Surgeon: Reynaldo Campuzano MD;  [facility]      SPINAL PUNCTURE,LUMBAR, INTRATHECAL CHEMO DELIVERY N/A 10/24/2019    Procedure: LUMBAR PUNCTURE, WITH INTRATHECAL CHEMOTHERAPY ADMINISTRATION;  Surgeon: Félix Van APRN CNP;  [facility]      SPINAL PUNCTURE,LUMBAR, INTRATHECAL CHEMO DELIVERY N/A 10/31/2019    Procedure: Lumbar puncture with IT Chemo (not CD);  Surgeon: Reynaldo Campuzano MD;  [facility]      SPINAL PUNCTURE,LUMBAR, INTRATHECAL CHEMO DELIVERY N/A 12/19/2019    Procedure: Lumbar puncture with IT Chem (CD);  Surgeon: Félix Van APRN CNP;  [facility]      THORACENTESIS N/A 8/31/2019    Procedure: Thoracentesis;  Surgeon: Michell Keith MD;  [facility]: [facility] OR          Anesthesia Evaluation    ROS/Med Hx    No history of anesthetic complications    Cardiovascular Findings - negative ROS    Neuro Findings - negative ROS    Pulmonary Findings   (-) asthma and apnea (indicates he has been told that he snores)    HENT Findings - negative HENT ROS    Skin Findings - negative skin ROS      GI/Hepatic/Renal Findings   (-) GERD    Endocrine/Metabolic Findings - negative ROS      Genetic/Syndrome Findings - negative genetics/syndromes ROS    Hematology/Oncology Findings   (+) cancer  Comments: T lymphoblastic lymphoma            PHYSICAL EXAM:   Mental Status/Neuro: A/A/O   Airway: Facies: Feasible  Mallampati: Not Assessed  Mouth/Opening: Full  TM distance: > 6 cm  Neck ROM: Full   Respiratory: Auscultation: CTAB     Resp. Rate: Normal     Resp. Effort: Normal      CV: Rhythm: Regular  Rate: Age appropriate  Heart: Normal Sounds  Edema: None   Comments:      Dental: Normal Dentition                  LABS:  CBC:   Lab Results   Component Value Date    WBC 2.7 (L) 12/23/2019    WBC 2.2 (L) 12/19/2019    HGB 8.9 (L) 12/23/2019    HGB 8.2 (L) 12/19/2019    HCT 27.9 (L) 12/23/2019    HCT 24.9 (L) 12/19/2019      "12/23/2019     12/19/2019     BMP:   Lab Results   Component Value Date     12/23/2019     12/19/2019    POTASSIUM 3.8 12/23/2019    POTASSIUM 4.0 12/19/2019    CHLORIDE 110 (H) 12/23/2019    CHLORIDE 109 12/19/2019    CO2 27 12/23/2019    CO2 28 12/19/2019    BUN 11 12/23/2019    BUN 10 12/19/2019    CR 0.50 (L) 12/23/2019    CR 0.56 (L) 12/19/2019    GLC 93 12/23/2019     (H) 12/19/2019     COAGS:   Lab Results   Component Value Date    PTT 45 (H) 11/14/2019    INR 1.09 11/14/2019    FIBR 293 11/14/2019     POC:   Lab Results   Component Value Date    BGM 87 11/11/2019     OTHER:   Lab Results   Component Value Date    LACT 0.4 (L) 11/11/2019    MICHAEL 8.2 (L) 12/23/2019    PHOS 4.2 11/17/2019    MAG 2.1 11/17/2019    ALBUMIN 3.6 12/23/2019    PROTTOTAL 6.4 (L) 12/23/2019    ALT 41 12/23/2019    AST 17 12/23/2019    ALKPHOS 73 12/23/2019    BILITOTAL 0.2 12/23/2019    LIPASE 1,239 (H) 11/11/2019    AMYLASE 246 (H) 11/11/2019    .0 (H) 11/11/2019        Preop Vitals    BP Readings from Last 3 Encounters:   12/23/19 123/55   12/20/19 121/69   12/19/19 118/71    Pulse Readings from Last 3 Encounters:   12/23/19 81   12/20/19 85   12/19/19 83      Resp Readings from Last 3 Encounters:   12/23/19 16   12/20/19 18   12/19/19 16    SpO2 Readings from Last 3 Encounters:   12/23/19 99%   12/20/19 99%   12/19/19 98%      Temp Readings from Last 1 Encounters:   12/23/19 36.8  C (98.2  F) (Oral)    Ht Readings from Last 1 Encounters:   12/23/19 1.842 m (6' 0.52\")      Wt Readings from Last 1 Encounters:   12/23/19 82.1 kg (181 lb)    Estimated body mass index is 24.2 kg/m  as calculated from the following:    Height as of an earlier encounter on 12/23/19: 1.842 m (6' 0.52\").    Weight as of an earlier encounter on 12/23/19: 82.1 kg (181 lb).     LDA:  Port A Cath Single 10/24/19 Right Chest wall (Active)   Site Assessment WDL 12/23/2019 11:12 AM   Line Status Saline locked 12/23/2019 11:12 AM "   Extravasation? No 12/23/2019 11:12 AM   Dressing Intervention Transparent 12/23/2019 11:12 AM   Number of days: 60        Assessment:   ASA SCORE: 3            Plan:   Anes. Type:  General   Pre-Medication: None   Induction:  IV (Standard)   Airway: Native Airway   Access/Monitoring: Central Access/Port present   Maintenance: Propofol Sedation     Postop Plan:   Postop Pain: None  Postop Sedation/Airway: Not planned  Disposition: Outpatient     PONV Management:    Prevention: Ondansetron, Propofol     CONSENT: Direct conversation   Plan and risks discussed with: Patient   Blood Products: Consent Deferred (Minimal Blood Loss)           Jyoti Martinez MD

## 2019-12-24 NOTE — PROGRESS NOTES
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 Lazaro presented with acute onset cough and was found to have an anterior mediastinal mass and malignant left-sided pleural effusion.  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  He was admitted to [facility] oncology service 8/30 and started on treatment per [id number].  He had a pleural effusion that required a chest tube and a pericardial effusion that was not drained. His Induction was complicated by cardiac compression secondary to his mass leading to tamponade, this improved through out his hospital stay.  He also had dysphagia that improved with treatment of his mass, swallow study was normal. His course was recently complicated by extensive bilateral UE DVTs, he remains on anticoagulation.  Most recently his course was complicated by the development of severe asparaginase induced pancreatitis. He became quite ill with SIRS and associated hypotension, he required pressor support in the ICU.  Lazaro also had severe pain that was initially managed with a PCA and transitioned to oral prior to discharge.  He comes to clinic today with his Grandma to start Interim Maintenance #1.    HPI:   Lazaro states he had a nice weekend. He was a little tired but was able to relax. He went to his other grandma's house for early jose eduardo celebrations and it was fun. He denies any pain today and notes that his appetite is good. He denies any nausea or vomiting. He has good stools and denies any numbness or sharp shooting pains in his toes or fingers. Denies rash. No fevers or illnesses. He is injecting the lovenox himself and alternates thighs. He states that its not too bad and he is used to it. He did hold his lovenox dose for 24 hours in preparation for today's LP and states his last lovenox was yesterday morning. He had an appointment with  GI recently and was advised to stick to a low fat and low grease diet which he is doing and things he can stick to.     Review of systems:  Remainder of ROS is complete and negative.    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  He is looking forward to [place].     Current Medications:  Current Outpatient Medications on File Prior to Visit   Medication Sig Dispense Refill     acetaminophen (TYLENOL) 325 MG tablet Take 2 tablets (650 mg) by mouth every 6 hours as needed for mild pain 60 tablet 0     diphenhydrAMINE (BENADRYL) 25 MG capsule Take 1-2 capsules (25-50 mg) by mouth every 6 hours as needed (Breakthrough Nausea and Vomiting ) 30 capsule 1     enoxaparin ANTICOAGULANT (LOVENOX) 100 MG/ML syringe Inject 1 mL (100 mg) Subcutaneous every 12 hours 180 mL 0     melatonin 3 MG tablet Take 1 tablet (3 mg) by mouth At Bedtime 30 tablet 1     ondansetron (ZOFRAN-ODT) 8 MG ODT tab Take 1 tablet (8 mg) by mouth every 6 hours as needed for nausea 20 tablet 0     oxyCODONE (ROXICODONE) 10 MG tablet Take 1 tablet (10 mg) by mouth every 4 hours as needed for severe pain 42 tablet 0     pantoprazole (PROTONIX) 40 MG EC tablet Take 1 tablet (40  "mg) by mouth every morning (before breakfast) 30 tablet 0     polyethylene glycol (MIRALAX/GLYCOLAX) packet Take 17 g by mouth daily 30 packet 0     scopolamine (TRANSDERM) 1 MG/3DAYS 72 hr patch Place 1 patch onto the skin every 72 hours 10 patch 3     sennosides (SENOKOT) 8.6 MG tablet Take 2 tablets by mouth 2 times daily as needed for constipation 60 each 1     sulfamethoxazole-trimethoprim (BACTRIM DS/SEPTRA DS) 800-160 MG tablet Take 1 tablet by mouth Every Mon, Tues two times daily 16 tablet 3     Vitamin D, Cholecalciferol, 25 MCG (1000 UT) TABS Take 2 tablets (2,000 Units) by mouth daily 60 tablet 3   Lazaro reports he is taking bactrim and lovenox regularly although lovenox has been on hold for procedure today.    Received influenza vaccine for the 4235-1737 season.      Physical Exam:   Temp:  [97.4  F (36.3  C)-98.6  F (37  C)] 98.6  F (37  C)  Pulse:  [58-81] 61  Heart Rate:  [58-68] 68  Resp:  [13-18] 16  BP: ()/(34-78) 100/78  SpO2:  [97 %-100 %] 97 %  Wt Readings from Last 4 Encounters:   12/23/19 82.1 kg (181 lb)   12/20/19 81.2 kg (179 lb)   12/19/19 81.5 kg (179 lb 10.8 oz)   12/12/19 78.4 kg (172 lb 13.5 oz)     Ht Readings from Last 2 Encounters:   12/23/19 1.842 m (6' 0.52\")   12/20/19 1.854 m (6' 1\")     Weight at diagnosis was 75.2kg,   General: Lazaro is alert, interactive and appropriate. He appears well, is upbeat and in no obvious pain. Cushingoid facies.  HEENT: Skull is atrauamatic and normocephalic.  Full head of hair. PERRLA, sclera are non icteric and not injected, EOM are intact, gaze slightly disconjugate which is his baseline. Nares are patent without drainage. Oropharynx clear, no plaques noted. Buccal mucosa and tongue clear. MMM. Tympanic membranes are opaque bilaterally with light reflex and landmarks present.  Voice no longer hoarse.  Lymph:  Neck is supple without lymphadenopathy.  There is no supraclavicular, axillary or inguinal lymphadenopathy " palpated.  Cardiovascular:  HR is regular, S1, S2 no murmur.  Capillary refill is < 2 seconds. Right arm without edema or erythema.  No pitting edema.  Cap refill < 2 sec. Peripheral pulses 2+.   Respiratory: No cough noted. Respirations are easy.  Lungs are clear to auscultation through out.  No crackles or wheezes.  Gastrointestinal:  BS present in all quadrants.  Abdomen is soft and flat.  Lazaro denies LUQ discomfort, no pain with palpation. No hepatosplenomegaly or masses are palpated.  Skin: Scattered papules on his forehead. No rashes, bruises or other skin lesions noted.  Neurological:  CN 2-12 grossly intact. Gait is normal.  No issues with balance. Sensation intact in hands and feet.     Musculoskeletal:  Good strength and ROM in all extremities.  Strong dorsiflexion at ankles and great toes (5/5) bilaterally without any pain at the Achilles.    Labs:   Results for orders placed or performed during the hospital encounter of 12/23/19   CBC with platelets differential     Status: Abnormal   Result Value Ref Range    WBC 2.7 (L) 4.0 - 11.0 10e9/L    RBC Count 2.82 (L) 4.4 - 5.9 10e12/L    Hemoglobin 8.9 (L) 13.3 - 17.7 g/dL    Hematocrit 27.9 (L) 40.0 - 53.0 %    MCV 99 78 - 100 fl    MCH 31.6 26.5 - 33.0 pg    MCHC 31.9 31.5 - 36.5 g/dL    RDW 19.6 (H) 10.0 - 15.0 %    Platelet Count 423 150 - 450 10e9/L    Diff Method Automated Method     % Neutrophils 52.0 %    % Lymphocytes 24.9 %    % Monocytes 20.0 %    % Eosinophils 0.4 %    % Basophils 0.8 %    % Immature Granulocytes 1.9 %    Nucleated RBCs 0 0 /100    Absolute Neutrophil 1.4 (L) 1.6 - 8.3 10e9/L    Absolute Lymphocytes 0.7 (L) 0.8 - 5.3 10e9/L    Absolute Monocytes 0.5 0.0 - 1.3 10e9/L    Absolute Eosinophils 0.0 0.0 - 0.7 10e9/L    Absolute Basophils 0.0 0.0 - 0.2 10e9/L    Abs Immature Granulocytes 0.1 0 - 0.4 10e9/L    Absolute Nucleated RBC 0.0    Comprehensive metabolic panel     Status: Abnormal   Result Value Ref Range    Sodium 142 133 - 144  mmol/L    Potassium 3.8 3.4 - 5.3 mmol/L    Chloride 110 (H) 94 - 109 mmol/L    Carbon Dioxide 27 20 - 32 mmol/L    Anion Gap 5 3 - 14 mmol/L    Glucose 93 70 - 99 mg/dL    Urea Nitrogen 11 7 - 30 mg/dL    Creatinine 0.50 (L) 0.66 - 1.25 mg/dL    GFR Estimate >90 >60 mL/min/[1.73_m2]    GFR Estimate If Black >90 >60 mL/min/[1.73_m2]    Calcium 8.2 (L) 8.5 - 10.1 mg/dL    Bilirubin Total 0.2 0.2 - 1.3 mg/dL    Albumin 3.6 3.4 - 5.0 g/dL    Protein Total 6.4 (L) 6.8 - 8.8 g/dL    Alkaline Phosphatase 73 40 - 150 U/L    ALT 41 0 - 70 U/L    AST 17 0 - 45 U/L   Cell count with differential CSF:     Status: None   Result Value Ref Range    WBC CSF 1 0 - 5 /uL    RBC CSF 0 0 - 2 /uL    Tube Number 2 #    Color CSF Colorless CLRL^Colorless    Appearance CSF Clear CLER^Clear     Recent Results (from the past 24 hour(s))   CT Chest/Abdomen/Pelvis w Contrast    Narrative    EXAMINATION: CT CHEST/ABDOMEN/PELVIS W CONTRAST, 12/19/2019 9:38 AM    TECHNIQUE:  Helical CT images from the thoracic inlet through the  symphysis pubis were obtained  with contrast. Contrast dose: 98ml's  isovue 300    COMPARISON: MRI 11/25/2019. CT 11/10/2019, 10/1/2019, 9/27/2019.    HISTORY: necrotizing pancreatitis, lymphoma, eval pleural effusion;  Drug-induced acute pancreatitis with uninfected necrosis; T  lymphoblastic lymphoma (H)    FINDINGS:  Chest: Right chest Port-A-Cath with tip terminating at the cavoatrial  junction. 1.1 cm left retropectoral lymph node, previous measured 1.2  cm.  No axillary lymphadenopathy. The visualized thyroid is grossly  unremarkable. Heart is normal in size. Soft tissue density anterior to  the mediastinum, consistent with thymic tissue. There is a small  pericardial effusion. Normal configuration and diameter of the great  vessels. The ascending aorta and the main pulmonary artery are  nonaneurysmal. Prominent lymph node in the right hilum measuring 10 mm  (series 2, image 25), previously measured 8 mm. Scattered  mediastinal  lymph nodes without suspicious. Thoracic esophagus is unremarkable.  The central tracheal bronchial tree is patent.    There is no pleural effusion. No pneumothorax. Trace biapical  scarring. No focal airspace consolidation.    Abdomen and pelvis: Liver, spleen, adrenal glands, and gallbladder are  unremarkable. The renal parenchyma is unremarkable. Extrarenal pelvis  bilaterally. There are fluid collections in the tail and body of the  pancreas which are overall decreased compared to prior MRCP. Walled  off necrosis within the mid body of the pancreas measures 1.3 x 1.6 cm  (series 2, image 67) and 1.3 x 1.5 cm within the tail of the pancreas  (series 2, image 61). No splenic vein thrombosis or pseudoaneurysms  noted. Retroaortic left renal vein.    Urinary bladder is partially distended and grossly unremarkable.  Symmetric seminal vesicles. Small prostatic calcification, present is  otherwise unremarkable. No free fluid or free air within the abdomen  or pelvis. Small umbilical hernia. No dilated small or large bowel. No  lymphadenopathy within the abdomen and pelvis.    Bones and soft tissues: Asymmetric gynecomastia, right greater than  left. Small bone islands noted. No suspicious osseous abdomen.       Impression    IMPRESSION:   1. Sequela of necrotizing pancreatitis with walled off necrotic  collections in the body and tail, decreased from the prior MRCP. No  additional sequela of pancreatitis appreciated and there is no  evidence of acute inflammation.  2. Resolution of previously noted pleural effusions. No focal  pulmonary disease.  3. Scattered lymph nodes within the chest, likely reactive. No  suspicious adenopathy.    I have personally reviewed the examination and initial interpretation  and I agree with the findings.    SHENA ORTIZ MD       Procedure:  A Lumbar Puncture was performed in the Pediatric Sedation Suite. Informed consent was obtained prior to the procedure. Lazaro PLUNKETT  Ashely was identified by facial recognition and ID arm band. A time-out was performed. Lazaro Lund was then placed in the left lateral decubitus position and the lumbosacral area was sterily prepped using Chloraprep followed by drape placement. Anatomic landmarks were identified by palpation. Then, a 22 gauge, 3.5 inch spinal needle was easily inserted into the L4/L5 interspace. On the first attempt approximately 3 mL of clear and colorless cerebrospinal fluid was obtained to be sent to the lab for cell count analysis and cytospin. Following that, 15 mg of intrathecal Methotrexate in 6 mL of preservative-free normal saline was infused without resistance. The needle was removed and a Band-Aid applied. Lazaro Lund tolerated this procedure very well.     Assessment:  Lazaro Lund is a 19 year old young man with T cell lymphoblastic lymphoma (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  He is being treated per [facility] protocol [id number].   He's had a CRu, resolution of lymphadenopathy however a small pleural effusion persists. His course has been complicated by extensive bilateral DVT and most recently severe pancreatitis.  He is tolerating a low fat low grease oral diet and his pancreatitis related pain has improved, he is not requiring any pain medications. Recent MRCP showed necrotizing pancreatitis and possible discontinuity of the pancreatic duct.  Asparaginase will be permanently discontinued from his treatment regimen.  He is on lovenox for anticoagulation. 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 Recent Anti-Xa was therapeutic. He is on Vit D for deficiency.  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  He comes to clinic today to start Interim Maintenance #1.    Plan:   1) CBCd with plts>75k and ANC>750 therefore meets criteria to begin IM#1. No need for transfusions today  2) Lazaro received IV caffeine after his LP for known spinal headaches   3) Refill Lovenox as requested  4)  Continue lovenox at current dose, recent level in goal range of 0.6-1.  Will check monthly (due early January).  He is aware of the need to hold PM prior and AM of his LPs. Will maintain platelet count > 30K while on anticoagulation.  Recent U/S stable, minor venous distention not surprising given the extent of his known thrombus, no other symptoms to suggest new acute thrombus. Plan to repeat U/S in late January.  5) Continue Vit D 3 2000IU daily, recheck level in 2 months.  6) Day 29 Induction LP deferred, original plan was to make up on Day 29 of Consolidation however given his recent complications I will defer this to Maintenance therapy.  7) Given recent pancreatitis and intermediate TPMT phenotype his 6MP dose was reduced for the 2nd half of Consolidation by 50%.  Will obtain TPMT genotype once that testing becomes available again.   8)  RTC Monday to proceed with IM#1 pending counts.    Patient seen and examined with Pediatric Hematology/Oncology Attending Dr. John oRwan MD  Pediatric Hematology/Oncology & BMT Fellow    I personally saw and examined the patient with the Fellow, Dr. Rowan as above.  I personally reviewed the laboratory and imaging results as above. I agree with the assessment and plan as above.  I personally was present and supervised the entire spinal tap procedure and chemotherapy administration as documented above.  Heather Lopez, MSc, MD  Pediatric Oncology

## 2019-12-26 LAB
APPEARANCE CSF: CLEAR
COLOR CSF: COLORLESS
RBC # CSF MANUAL: 0 /UL (ref 0–2)
TUBE # CSF: 2 #
WBC # CSF MANUAL: 1 /UL (ref 0–5)

## 2019-12-26 NOTE — TELEPHONE ENCOUNTER
PRIOR AUTHORIZATION DENIED    Medication: Dronabinol 2.5mg    Karishma Glass  Pediatric Discharge Pharmacy  Liaison  [facility]  Phone [phone]  Pager [phone]

## 2019-12-30 ENCOUNTER — HOME INFUSION (PRE-WILLOW HOME INFUSION) (OUTPATIENT)
Dept: PHARMACY | Facility: CLINIC | Age: 19
End: 2019-12-30

## 2020-01-02 ENCOUNTER — INFUSION THERAPY VISIT (OUTPATIENT)
Dept: INFUSION THERAPY | Facility: CLINIC | Age: 20
End: 2020-01-02
Attending: NURSE PRACTITIONER
Payer: MEDICAID

## 2020-01-02 ENCOUNTER — OFFICE VISIT (OUTPATIENT)
Dept: PEDIATRIC HEMATOLOGY/ONCOLOGY | Facility: CLINIC | Age: 20
End: 2020-01-02
Attending: NURSE PRACTITIONER
Payer: MEDICAID

## 2020-01-02 ENCOUNTER — PATIENT OUTREACH (OUTPATIENT)
Dept: GASTROENTEROLOGY | Facility: CLINIC | Age: 20
End: 2020-01-02

## 2020-01-02 VITALS
OXYGEN SATURATION: 99 % | HEIGHT: 73 IN | HEART RATE: 62 BPM | RESPIRATION RATE: 16 BRPM | SYSTOLIC BLOOD PRESSURE: 116 MMHG | BODY MASS INDEX: 23.23 KG/M2 | DIASTOLIC BLOOD PRESSURE: 67 MMHG | TEMPERATURE: 98.4 F | WEIGHT: 175.27 LBS

## 2020-01-02 DIAGNOSIS — C83.50 T LYMPHOBLASTIC LYMPHOMA (H): Primary | ICD-10-CM

## 2020-01-02 DIAGNOSIS — K85.31 DRUG-INDUCED ACUTE PANCREATITIS WITH UNINFECTED NECROSIS: Primary | ICD-10-CM

## 2020-01-02 LAB
ALBUMIN SERPL-MCNC: 3.4 G/DL (ref 3.4–5)
ALP SERPL-CCNC: 63 U/L (ref 40–150)
ALT SERPL W P-5'-P-CCNC: 31 U/L (ref 0–70)
ANION GAP SERPL CALCULATED.3IONS-SCNC: 4 MMOL/L (ref 3–14)
ANISOCYTOSIS BLD QL SMEAR: SLIGHT
AST SERPL W P-5'-P-CCNC: 13 U/L (ref 0–45)
BASOPHILS # BLD AUTO: 0 10E9/L (ref 0–0.2)
BASOPHILS NFR BLD AUTO: 0 %
BILIRUB SERPL-MCNC: 0.2 MG/DL (ref 0.2–1.3)
BUN SERPL-MCNC: 10 MG/DL (ref 7–30)
CALCIUM SERPL-MCNC: 8.2 MG/DL (ref 8.5–10.1)
CHLORIDE SERPL-SCNC: 112 MMOL/L (ref 94–109)
CO2 SERPL-SCNC: 27 MMOL/L (ref 20–32)
CREAT SERPL-MCNC: 0.46 MG/DL (ref 0.66–1.25)
DACRYOCYTES BLD QL SMEAR: SLIGHT
DIFFERENTIAL METHOD BLD: ABNORMAL
ELLIPTOCYTES BLD QL SMEAR: SLIGHT
EOSINOPHIL # BLD AUTO: 0 10E9/L (ref 0–0.7)
EOSINOPHIL NFR BLD AUTO: 0.5 %
ERYTHROCYTE [DISTWIDTH] IN BLOOD BY AUTOMATED COUNT: 17 % (ref 10–15)
GFR SERPL CREATININE-BSD FRML MDRD: >90 ML/MIN/{1.73_M2}
GLUCOSE SERPL-MCNC: 100 MG/DL (ref 70–99)
HCT VFR BLD AUTO: 30.8 % (ref 40–53)
HGB BLD-MCNC: 10.3 G/DL (ref 13.3–17.7)
IMM GRANULOCYTES # BLD: 0 10E9/L (ref 0–0.4)
IMM GRANULOCYTES NFR BLD: 0.5 %
LYMPHOCYTES # BLD AUTO: 0.7 10E9/L (ref 0.8–5.3)
LYMPHOCYTES NFR BLD AUTO: 36.9 %
MCH RBC QN AUTO: 32.4 PG (ref 26.5–33)
MCHC RBC AUTO-ENTMCNC: 33.4 G/DL (ref 31.5–36.5)
MCV RBC AUTO: 97 FL (ref 78–100)
MICROCYTES BLD QL SMEAR: PRESENT
MONOCYTES # BLD AUTO: 0.2 10E9/L (ref 0–1.3)
MONOCYTES NFR BLD AUTO: 9.6 %
NEUTROPHILS # BLD AUTO: 1 10E9/L (ref 1.6–8.3)
NEUTROPHILS NFR BLD AUTO: 52.5 %
NRBC # BLD AUTO: 0 10*3/UL
NRBC BLD AUTO-RTO: 0 /100
PLATELET # BLD AUTO: 276 10E9/L (ref 150–450)
PLATELET # BLD EST: ABNORMAL 10*3/UL
POIKILOCYTOSIS BLD QL SMEAR: SLIGHT
POLYCHROMASIA BLD QL SMEAR: SLIGHT
POTASSIUM SERPL-SCNC: 3.5 MMOL/L (ref 3.4–5.3)
PROT SERPL-MCNC: 6.4 G/DL (ref 6.8–8.8)
RBC # BLD AUTO: 3.18 10E12/L (ref 4.4–5.9)
RBC INCLUSIONS BLD: SLIGHT
SODIUM SERPL-SCNC: 143 MMOL/L (ref 133–144)
WBC # BLD AUTO: 1.9 10E9/L (ref 4–11)

## 2020-01-02 PROCEDURE — 25800030 ZZH RX IP 258 OP 636: Mod: ZF | Performed by: NURSE PRACTITIONER

## 2020-01-02 PROCEDURE — 80053 COMPREHEN METABOLIC PANEL: CPT | Performed by: PEDIATRICS

## 2020-01-02 PROCEDURE — 96413 CHEMO IV INFUSION 1 HR: CPT

## 2020-01-02 PROCEDURE — 25000128 H RX IP 250 OP 636: Mod: ZF | Performed by: PEDIATRICS

## 2020-01-02 PROCEDURE — 25000128 H RX IP 250 OP 636: Mod: ZF | Performed by: NURSE PRACTITIONER

## 2020-01-02 PROCEDURE — 96411 CHEMO IV PUSH ADDL DRUG: CPT

## 2020-01-02 PROCEDURE — 25800030 ZZH RX IP 258 OP 636: Mod: ZF | Performed by: PEDIATRICS

## 2020-01-02 PROCEDURE — 25000128 H RX IP 250 OP 636: Mod: ZF

## 2020-01-02 PROCEDURE — 85025 COMPLETE CBC W/AUTO DIFF WBC: CPT | Performed by: PEDIATRICS

## 2020-01-02 RX ORDER — ONDANSETRON 2 MG/ML
8 INJECTION INTRAMUSCULAR; INTRAVENOUS ONCE
Status: COMPLETED | OUTPATIENT
Start: 2020-01-02 | End: 2020-01-02

## 2020-01-02 RX ORDER — ONDANSETRON 2 MG/ML
INJECTION INTRAMUSCULAR; INTRAVENOUS
Status: COMPLETED
Start: 2020-01-02 | End: 2020-01-02

## 2020-01-02 RX ORDER — HEPARIN SODIUM (PORCINE) LOCK FLUSH IV SOLN 100 UNIT/ML 100 UNIT/ML
SOLUTION INTRAVENOUS
Status: COMPLETED
Start: 2020-01-02 | End: 2020-01-02

## 2020-01-02 RX ORDER — HEPARIN SODIUM (PORCINE) LOCK FLUSH IV SOLN 100 UNIT/ML 100 UNIT/ML
5 SOLUTION INTRAVENOUS
Status: DISCONTINUED | OUTPATIENT
Start: 2020-01-02 | End: 2020-01-02 | Stop reason: HOSPADM

## 2020-01-02 RX ADMIN — VINCRISTINE SULFATE 2 MG: 1 INJECTION, SOLUTION INTRAVENOUS at 15:12

## 2020-01-02 RX ADMIN — DEXTROSE MONOHYDRATE 50 ML: 50 INJECTION, SOLUTION INTRAVENOUS at 15:13

## 2020-01-02 RX ADMIN — SODIUM CHLORIDE 50 ML: 9 INJECTION, SOLUTION INTRAVENOUS at 15:13

## 2020-01-02 RX ADMIN — HEPARIN 5 ML: 100 SYRINGE at 15:49

## 2020-01-02 RX ADMIN — ONDANSETRON 8 MG: 2 INJECTION INTRAMUSCULAR; INTRAVENOUS at 14:26

## 2020-01-02 RX ADMIN — METHOTREXATE 300 MG: 25 INJECTION INTRA-ARTERIAL; INTRAMUSCULAR; INTRATHECAL; INTRAVENOUS at 15:26

## 2020-01-02 RX ADMIN — HEPARIN SODIUM (PORCINE) LOCK FLUSH IV SOLN 100 UNIT/ML 5 ML: 100 SOLUTION at 15:49

## 2020-01-02 ASSESSMENT — MIFFLIN-ST. JEOR: SCORE: 1847.49

## 2020-01-02 NOTE — PROGRESS NOTES
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 Lazaro presented with acute onset cough and was found to have an anterior mediastinal mass and malignant left-sided pleural effusion.  A CT guided biopsy was obtained at [facility] and pathology was consistent with T-cell leukemia vs lymphoma.  He was admitted to [facility] oncology service 8/30 and started on treatment per [id number].  He had a pleural effusion that required a chest tube and a pericardial effusion that was not drained. His Induction was complicated by cardiac compression secondary to his mass leading to tamponade, this improved through out his hospital stay.  He also had dysphagia that improved with treatment of his mass, swallow study was normal. His course was recently complicated by extensive bilateral UE DVTs, he remains on anticoagulation.  Most recently his course was complicated by the development of severe asparaginase induced pancreatitis. He became quite ill with SIRS and associated hypotension, he required pressor support in the ICU.  Lazaro also had severe pain that was initially managed with a PCA and transitioned to oral prior to discharge.  He comes to clinic today for Day 11 of Interim Maintenance #1.    HPI:   Lazaro reports he's been doing well since his last visit.  He had one small sore on the inside of his right cheek that has since resolved.  His appetite has been very good, no GI discomfort.  No pain.  He denies diarrhea or constipation.  One large bruise on his left leg, no other bruising, no bleeding.  Energy level has been pretty good although has decreased slightly in the past few days.  Sleeping well at night. No fever.   No paresthesias.     Review of systems:  Remainder of ROS is complete and negative.    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  He is looking forward to [place].     Current Medications:  Current Outpatient Medications on File Prior to Visit   Medication Sig Dispense Refill     acetaminophen (TYLENOL) 325 MG tablet Take 2 tablets (650 mg) by mouth every 6 hours as needed for mild pain 60 tablet 0     diphenhydrAMINE (BENADRYL) 25 MG capsule Take 1-2 capsules (25-50 mg) by mouth every 6 hours as needed (Breakthrough Nausea and Vomiting ) 30 capsule 1     enoxaparin ANTICOAGULANT (LOVENOX) 100 MG/ML syringe Inject 1 mL (100 mg) Subcutaneous every 12 hours 180 mL 0     melatonin 3 MG tablet Take 1 tablet (3 mg) by mouth At Bedtime 30 tablet 1     ondansetron (ZOFRAN-ODT) 8 MG ODT tab Take 1 tablet (8 mg) by mouth every 6 hours as needed for nausea 20 tablet 0     oxyCODONE (ROXICODONE) 10 MG tablet Take 1 tablet (10 mg) by mouth every 4 hours as needed for severe pain 42 tablet 0     pantoprazole (PROTONIX) 40 MG EC tablet Take 1 tablet (40 mg) by mouth every morning (before breakfast) 30 tablet 0     polyethylene glycol (MIRALAX/GLYCOLAX) packet Take 17 g by mouth daily 30 packet 0     scopolamine (TRANSDERM) 1 MG/3DAYS 72 hr patch Place 1 patch onto the skin every 72 hours 10 patch 3     sennosides (SENOKOT) 8.6 MG tablet Take 2 tablets by mouth 2 times daily as  "needed for constipation 60 each 1     sulfamethoxazole-trimethoprim (BACTRIM DS/SEPTRA DS) 800-160 MG tablet Take 1 tablet by mouth Every Mon, Tues two times daily 16 tablet 3     Vitamin D, Cholecalciferol, 25 MCG (1000 UT) TABS Take 2 tablets (2,000 Units) by mouth daily 60 tablet 3   Lazaro reports he is taking bactrim and lovenox regularly.    Received influenza vaccine for the 3133-8015 season.      Physical Exam:   Temp:  [98.5  F (36.9  C)] 98.5  F (36.9  C)  Heart Rate:  [72] 72  Resp:  [18] 18  BP: (117)/(56) 117/56  SpO2:  [99 %] 99 %  Wt Readings from Last 4 Encounters:   01/02/20 79.5 kg (175 lb 4.3 oz)   12/23/19 82.1 kg (181 lb)   12/20/19 81.2 kg (179 lb)   12/19/19 81.5 kg (179 lb 10.8 oz)     Ht Readings from Last 2 Encounters:   01/02/20 1.844 m (6' 0.6\")   12/23/19 1.842 m (6' 0.52\")     Weight at diagnosis was 75.2kg,   General: Lazaro is alert, interactive and appropriate. He appears well, is upbeat and in no obvious pain.  HEENT: Skull is atrauamatic and normocephalic.  Full head of hair. PERRLA, sclera are non icteric and not injected, EOM are intact, gaze slightly disconjugate which is his baseline. Nares are patent without drainage. Oropharynx clear, no plaques noted, no mucositis. Buccal mucosa and tongue clear. MMM. Tympanic membranes are opaque bilaterally with light reflex and landmarks present.  Voice no longer hoarse.  Lymph:  Neck is supple without lymphadenopathy.  There is no supraclavicular, axillary or inguinal lymphadenopathy palpated.  Cardiovascular:  HR is regular, S1, S2 no murmur.  Capillary refill is < 2 seconds. Right arm without edema or erythema.  No pitting edema.  Cap refill < 2 sec. Peripheral pulses 2+/=. He has mildly distended veins noted on the left upper chest, not extending up to the neck, overlying the pectoralis.   Respiratory: No cough noted. Respirations are easy.  Lungs are clear to auscultation through out.  No crackles or wheezes.  Gastrointestinal:  BS " present in all quadrants.  Abdomen is soft and flat.  Lazaro denies LUQ discomfort, no pain with palpation. No hepatosplenomegaly or masses are palpated.  Skin: Scattered papules on his forehead. No rashes, bruises or other skin lesions noted.  Neurological:  CN 2-12 grossly intact. Gait is normal.  No issues with balance. Sensation intact in hands and feet.     Musculoskeletal:  Good strength and ROM in all extremities.  Strong dorsiflexion at ankles and great toes (5/5) bilaterally without any pain at the Achilles.    Labs:   Results for orders placed or performed in visit on 01/02/20   CBC with platelets differential     Status: Abnormal (In process)   Result Value Ref Range    WBC 1.9 (L) 4.0 - 11.0 10e9/L    RBC Count 3.18 (L) 4.4 - 5.9 10e12/L    Hemoglobin 10.3 (L) 13.3 - 17.7 g/dL    Hematocrit 30.8 (L) 40.0 - 53.0 %    MCV 97 78 - 100 fl    MCH 32.4 26.5 - 33.0 pg    MCHC 33.4 31.5 - 36.5 g/dL    RDW 17.0 (H) 10.0 - 15.0 %    Platelet Count 276 150 - 450 10e9/L    Diff Method PENDING    Comprehensive metabolic panel     Status: Abnormal   Result Value Ref Range    Sodium 143 133 - 144 mmol/L    Potassium 3.5 3.4 - 5.3 mmol/L    Chloride 112 (H) 94 - 109 mmol/L    Carbon Dioxide 27 20 - 32 mmol/L    Anion Gap 4 3 - 14 mmol/L    Glucose 100 (H) 70 - 99 mg/dL    Urea Nitrogen 10 7 - 30 mg/dL    Creatinine 0.46 (L) 0.66 - 1.25 mg/dL    GFR Estimate >90 >60 mL/min/[1.73_m2]    GFR Estimate If Black >90 >60 mL/min/[1.73_m2]    Calcium 8.2 (L) 8.5 - 10.1 mg/dL    Bilirubin Total 0.2 0.2 - 1.3 mg/dL    Albumin 3.4 3.4 - 5.0 g/dL    Protein Total 6.4 (L) 6.8 - 8.8 g/dL    Alkaline Phosphatase 63 40 - 150 U/L    ALT 31 0 - 70 U/L    AST 13 0 - 45 U/L     Assessment:  Lazaro Budreau is a 19 year old young man with T cell lymphoblastic lymphoma (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  He is being treated per [facility] protocol [id number].   He's had a CR at the end of Consolidation. His course has been complicated by extensive bilateral  DVT and most recently severe pancreatitis. His pancreatitis was managed conservatively and his recent imaging shows ongoing improvement, he is being followed by GI.  Asparaginase will be permanently discontinued from his treatment regimen.  He is on lovenox for anticoagulation. 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 Recent Anti-Xa was therapeutic. He is on Vit D for deficiency.  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  He comes to clinic today for Day 11 of Interim Maintenance #1.     Plan:   1) Reviewed labs with Lazaro, counts are good, will escalate methotrexate per protocol.  2) Lazaro to follow up with abdominal CT in the next 4 weeks per Dr Coronel.  3) From a lymphoma perspective will plan to repeat CT at the end of therapy unless concerns arise in the interim.  4) Reviewed at length with Lazaro the s/sx to monitor for and reasons to call.  If he is having any vomiting I've asked him to call.  5) Continue lovenox at current dose, recent level in goal range of 0.6-1.  Will check monthly (due early January).  He is aware of the need to hold PM prior and AM of his LPs. Will maintain platelet count > 30K while on anticoagulation.  Recent U/S stable, minor venous distention not surprising given the extent of his known thrombus, no other symptoms to suggest new acute thrombus. Plan to repeat U/S in late January.  6) Continue Vit D 3 2000IU daily, recheck level in 2 months.  7) Day 29 Induction LP deferred, original plan was to make up on Day 29 of Consolidation however given his recent complications I will defer this to Maintenance therapy.  8) Given recent pancreatitis and intermediate TPMT phenotype his 6MP dose was reduced for the 2nd half of Consolidation by 50%.  Will obtain TPMT genotype once that testing becomes available again.   9) Given significant spinal headache history will plan for IV caffeine following LPs in the future.   10) Will obtain pre-DI echo on  2/4.  11) RTC in 10 days for Day 21 therapy, sooner with concerns.

## 2020-01-02 NOTE — LETTER
January 2, 2020    Lazaro Lund                              [id number] [facility] [address]    Dear Dr. Berna Willis would like you to have follow up imaging. A follow up CT has been scheduled for you on 1/23/20 at 10:30 am, 10:15 check in at Peds Radiology at [facility].    You will be NPO for your procedure that day. It is also necessary for your imaging.     Please let us know if you have questions or need additional information.       Sincerely,      Meena Samaniego, RN Care Coordinator  Dr. Quiroz, Dr. Coronel & Dr. Jones  [facility]  [phone]

## 2020-01-02 NOTE — PROGRESS NOTES
Per Dr. Coronel on 12/27  CT with contrast of the abdomen in another 4w     CT ordered, patient would like scheduled when here for other procedures. Has f/up scheduled on 1/23 on [facility], CT scheduled prior to LP. See Letter for details.    Meena Samaniego, RN Care Coordinator

## 2020-01-02 NOTE — PROGRESS NOTES
Infusion Nursing Note    Lazaro Lund Presents to [facility] center today for: C1D1 VCR/MTX    Due to : T lymphoblastic lymphoma (H)    Patient seen by Provider : Yes: Luana Van NP     present during visit today: Not Applicable    Note: Pt denies any fevers and/or infections.     Intravenous Access: Yes: Port accessed using sterile technique. Blood return noted; labs drawn as ordered.    Treatment conditions: Platelet and ANC    Parameters Met for treatment per Luana Van NP    Pre-Meds:Yes, 8mg IV Zofran    Education provided: Yes: about VCR/MTX    Post Infusion Assessment: Vincristine given to gravity; blood return noted pre/mid/post infusion. Methotrexate infusion completed without complication; blood return noted pre/post infusion. VSS. Port heparin locked and de-accessed.     Discharge Plan:   Patient left clinic in stable condition at end of cares.

## 2020-01-02 NOTE — LETTER
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 Lazaro presented with acute onset cough and was found to have an anterior mediastinal mass and malignant left-sided pleural effusion.  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  He was admitted to [facility] oncology service 8/30 and started on treatment per [id number].  He had a pleural effusion that required a chest tube and a pericardial effusion that was not drained. His Induction was complicated by cardiac compression secondary to his mass leading to tamponade, this improved through out his hospital stay.  He also had dysphagia that improved with treatment of his mass, swallow study was normal. His course was recently complicated by extensive bilateral UE DVTs, he remains on anticoagulation.  Most recently his course was complicated by the development of severe asparaginase induced pancreatitis. He became quite ill with SIRS and associated hypotension, he required pressor support in the ICU.  Lazaro also had severe pain that was initially managed with a PCA and transitioned to oral prior to discharge.  He comes to clinic today for Day 11 of Interim Maintenance #1.    HPI:   Lazaro reports he's been doing well since his last visit.  He had one small sore on the inside of his right cheek that has since resolved.  His appetite has been very good, no GI discomfort.  No pain.  He denies diarrhea or constipation.  One large bruise on his left leg, no other bruising, no bleeding.  Energy level has been pretty good although has decreased slightly in the past few days.  Sleeping well at night. No fever.   No paresthesias.     Review of systems:  Remainder of ROS is complete and negative.    PMH:   Past Medical History:   Diagnosis Date     Acute necrotizing  pancreatitis 11/07/2019    attributed to asparaginase     DVT of upper extremity (deep vein thrombosis) (H) 09/26/2019    Bilateral      Edema of upper extremity 10/17/2019     Folliculitis 10/17/2019     Migraine 2006    have resolved     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   Spinal HA following LP  TPMT shows Intermediate activity  Factor V leiden and prothrombin negative  Pancreatitis secondary to asparaginase    PFMH:   Family History   Problem Relation Age of Onset     No Known Problems Mother      No Known Problems Father      Asthma Brother      Thyroid Cancer Paternal Grandmother      Melanoma Paternal Aunt        Social History: Lazaro previously lived at home with his dad and step mom in [place] but is now staying with his mom in [place].  He is looking forward to [place].     Current Medications:  Current Outpatient Medications on File Prior to Visit   Medication Sig Dispense Refill     acetaminophen (TYLENOL) 325 MG tablet Take 2 tablets (650 mg) by mouth every 6 hours as needed for mild pain 60 tablet 0     diphenhydrAMINE (BENADRYL) 25 MG capsule Take 1-2 capsules (25-50 mg) by mouth every 6 hours as needed (Breakthrough Nausea and Vomiting ) 30 capsule 1     enoxaparin ANTICOAGULANT (LOVENOX) 100 MG/ML syringe Inject 1 mL (100 mg) Subcutaneous every 12 hours 180 mL 0     melatonin 3 MG tablet Take 1 tablet (3 mg) by mouth At Bedtime 30 tablet 1     ondansetron (ZOFRAN-ODT) 8 MG ODT tab Take 1 tablet (8 mg) by mouth every 6 hours as needed for nausea 20 tablet 0     oxyCODONE (ROXICODONE) 10 MG tablet Take 1 tablet (10 mg) by mouth every 4 hours as needed for severe pain 42 tablet 0     pantoprazole (PROTONIX) 40 MG EC tablet Take 1 tablet (40 mg) by mouth every morning (before breakfast) 30 tablet 0     polyethylene glycol (MIRALAX/GLYCOLAX) packet Take 17 g by mouth daily 30 packet 0     scopolamine (TRANSDERM) 1 MG/3DAYS 72 hr patch Place 1 patch onto the skin every 72 hours 10 patch 3      "sennosides (SENOKOT) 8.6 MG tablet Take 2 tablets by mouth 2 times daily as needed for constipation 60 each 1     sulfamethoxazole-trimethoprim (BACTRIM DS/SEPTRA DS) 800-160 MG tablet Take 1 tablet by mouth Every Mon, Tues two times daily 16 tablet 3     Vitamin D, Cholecalciferol, 25 MCG (1000 UT) TABS Take 2 tablets (2,000 Units) by mouth daily 60 tablet 3   Lazaro reports he is taking bactrim and lovenox regularly.    Received influenza vaccine for the 9933-8117 season.      Physical Exam:   Temp:  [98.5  F (36.9  C)] 98.5  F (36.9  C)  Heart Rate:  [72] 72  Resp:  [18] 18  BP: (117)/(56) 117/56  SpO2:  [99 %] 99 %  Wt Readings from Last 4 Encounters:   01/02/20 79.5 kg (175 lb 4.3 oz)   12/23/19 82.1 kg (181 lb)   12/20/19 81.2 kg (179 lb)   12/19/19 81.5 kg (179 lb 10.8 oz)     Ht Readings from Last 2 Encounters:   01/02/20 1.844 m (6' 0.6\")   12/23/19 1.842 m (6' 0.52\")     Weight at diagnosis was 75.2kg,   General: Lazaro is alert, interactive and appropriate. He appears well, is upbeat and in no obvious pain.  HEENT: Skull is atrauamatic and normocephalic.  Full head of hair. PERRLA, sclera are non icteric and not injected, EOM are intact, gaze slightly disconjugate which is his baseline. Nares are patent without drainage. Oropharynx clear, no plaques noted, no mucositis. Buccal mucosa and tongue clear. MMM. Tympanic membranes are opaque bilaterally with light reflex and landmarks present.  Voice no longer hoarse.  Lymph:  Neck is supple without lymphadenopathy.  There is no supraclavicular, axillary or inguinal lymphadenopathy palpated.  Cardiovascular:  HR is regular, S1, S2 no murmur.  Capillary refill is < 2 seconds. Right arm without edema or erythema.  No pitting edema.  Cap refill < 2 sec. Peripheral pulses 2+/=. He has mildly distended veins noted on the left upper chest, not extending up to the neck, overlying the pectoralis.   Respiratory: No cough noted. Respirations are easy.  Lungs are clear to " auscultation through out.  No crackles or wheezes.  Gastrointestinal:  BS present in all quadrants.  Abdomen is soft and flat.  Lazaro denies LUQ discomfort, no pain with palpation. No hepatosplenomegaly or masses are palpated.  Skin: Scattered papules on his forehead. No rashes, bruises or other skin lesions noted.  Neurological:  CN 2-12 grossly intact. Gait is normal.  No issues with balance. Sensation intact in hands and feet.     Musculoskeletal:  Good strength and ROM in all extremities.  Strong dorsiflexion at ankles and great toes (5/5) bilaterally without any pain at the Achilles.    Labs:   Results for orders placed or performed in visit on 01/02/20   CBC with platelets differential     Status: Abnormal (In process)   Result Value Ref Range    WBC 1.9 (L) 4.0 - 11.0 10e9/L    RBC Count 3.18 (L) 4.4 - 5.9 10e12/L    Hemoglobin 10.3 (L) 13.3 - 17.7 g/dL    Hematocrit 30.8 (L) 40.0 - 53.0 %    MCV 97 78 - 100 fl    MCH 32.4 26.5 - 33.0 pg    MCHC 33.4 31.5 - 36.5 g/dL    RDW 17.0 (H) 10.0 - 15.0 %    Platelet Count 276 150 - 450 10e9/L    Diff Method PENDING    Comprehensive metabolic panel     Status: Abnormal   Result Value Ref Range    Sodium 143 133 - 144 mmol/L    Potassium 3.5 3.4 - 5.3 mmol/L    Chloride 112 (H) 94 - 109 mmol/L    Carbon Dioxide 27 20 - 32 mmol/L    Anion Gap 4 3 - 14 mmol/L    Glucose 100 (H) 70 - 99 mg/dL    Urea Nitrogen 10 7 - 30 mg/dL    Creatinine 0.46 (L) 0.66 - 1.25 mg/dL    GFR Estimate >90 >60 mL/min/[1.73_m2]    GFR Estimate If Black >90 >60 mL/min/[1.73_m2]    Calcium 8.2 (L) 8.5 - 10.1 mg/dL    Bilirubin Total 0.2 0.2 - 1.3 mg/dL    Albumin 3.4 3.4 - 5.0 g/dL    Protein Total 6.4 (L) 6.8 - 8.8 g/dL    Alkaline Phosphatase 63 40 - 150 U/L    ALT 31 0 - 70 U/L    AST 13 0 - 45 U/L     Assessment:  Lazaro Lund is a 19 year old young man with T cell lymphoblastic lymphoma (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  He is being treated per [facility] protocol [id number].   He's had a CR at the end  of Consolidation. His course has been complicated by extensive bilateral DVT and most recently severe pancreatitis. His pancreatitis was managed conservatively and his recent imaging shows ongoing improvement, he is being followed by GI.  Asparaginase will be permanently discontinued from his treatment regimen.  He is on lovenox for anticoagulation. 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 Recent Anti-Xa was therapeutic. He is on Vit D for deficiency.  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  He comes to clinic today for Day 11 of Interim Maintenance #1.     Plan:   1) Reviewed labs with Lazaro, counts are good, will escalate methotrexate per protocol.  2) Lazaro to follow up with abdominal CT in the next 4 weeks per Dr Coronel.  3) From a lymphoma perspective will plan to repeat CT at the end of therapy unless concerns arise in the interim.  4) Reviewed at length with Lazaro the s/sx to monitor for and reasons to call.  If he is having any vomiting I've asked him to call.  5) Continue lovenox at current dose, recent level in goal range of 0.6-1.  Will check monthly (due early January).  He is aware of the need to hold PM prior and AM of his LPs. Will maintain platelet count > 30K while on anticoagulation.  Recent U/S stable, minor venous distention not surprising given the extent of his known thrombus, no other symptoms to suggest new acute thrombus. Plan to repeat U/S in late January.  6) Continue Vit D 3 2000IU daily, recheck level in 2 months.  7) Day 29 Induction LP deferred, original plan was to make up on Day 29 of Consolidation however given his recent complications I will defer this to Maintenance therapy.  8) Given recent pancreatitis and intermediate TPMT phenotype his 6MP dose was reduced for the 2nd half of Consolidation by 50%.  Will obtain TPMT genotype once that testing becomes available again.   9) Given significant spinal headache history will plan for  IV caffeine following LPs in the future.   10) Will obtain pre-DI echo on 2/4.  11) RTC in 10 days for Day 21 therapy, sooner with concerns.         YOHAN Dunn CNP

## 2020-01-03 NOTE — PROGRESS NOTES
This is a recent snapshot of the patient's [facility] medical record.  For current drug dose and complete information and questions, call [phone] or In Basket pool, [facility] home infusion ([id number])  CSN Number:  [id number]

## 2020-01-13 ENCOUNTER — OFFICE VISIT (OUTPATIENT)
Dept: PEDIATRIC HEMATOLOGY/ONCOLOGY | Facility: CLINIC | Age: 20
End: 2020-01-13
Attending: PEDIATRICS
Payer: MEDICAID

## 2020-01-13 ENCOUNTER — INFUSION THERAPY VISIT (OUTPATIENT)
Dept: INFUSION THERAPY | Facility: CLINIC | Age: 20
End: 2020-01-13
Attending: PEDIATRICS
Payer: MEDICAID

## 2020-01-13 VITALS
BODY MASS INDEX: 24.22 KG/M2 | TEMPERATURE: 98.3 F | HEART RATE: 74 BPM | HEIGHT: 73 IN | SYSTOLIC BLOOD PRESSURE: 98 MMHG | WEIGHT: 182.76 LBS | DIASTOLIC BLOOD PRESSURE: 71 MMHG | RESPIRATION RATE: 20 BRPM | OXYGEN SATURATION: 100 %

## 2020-01-13 DIAGNOSIS — C83.50 T LYMPHOBLASTIC LYMPHOMA (H): Primary | ICD-10-CM

## 2020-01-13 DIAGNOSIS — I82.623 ACUTE DEEP VEIN THROMBOSIS (DVT) OF OTHER VEIN OF BOTH UPPER EXTREMITIES (H): ICD-10-CM

## 2020-01-13 PROBLEM — K85.90 ACUTE PANCREATITIS: Status: RESOLVED | Noted: 2019-11-15 | Resolved: 2020-01-13

## 2020-01-13 LAB
ALBUMIN SERPL-MCNC: 3.6 G/DL (ref 3.4–5)
ALP SERPL-CCNC: 69 U/L (ref 40–150)
ALT SERPL W P-5'-P-CCNC: 67 U/L (ref 0–70)
ANION GAP SERPL CALCULATED.3IONS-SCNC: 3 MMOL/L (ref 3–14)
AST SERPL W P-5'-P-CCNC: 17 U/L (ref 0–45)
BASOPHILS # BLD AUTO: 0 10E9/L (ref 0–0.2)
BASOPHILS NFR BLD AUTO: 0.3 %
BILIRUB SERPL-MCNC: 0.2 MG/DL (ref 0.2–1.3)
BUN SERPL-MCNC: 13 MG/DL (ref 7–30)
CALCIUM SERPL-MCNC: 8.6 MG/DL (ref 8.5–10.1)
CHLORIDE SERPL-SCNC: 112 MMOL/L (ref 94–109)
CO2 SERPL-SCNC: 29 MMOL/L (ref 20–32)
CREAT SERPL-MCNC: 0.59 MG/DL (ref 0.66–1.25)
DIFFERENTIAL METHOD BLD: ABNORMAL
EOSINOPHIL # BLD AUTO: 0.1 10E9/L (ref 0–0.7)
EOSINOPHIL NFR BLD AUTO: 3.9 %
ERYTHROCYTE [DISTWIDTH] IN BLOOD BY AUTOMATED COUNT: 15.9 % (ref 10–15)
GFR SERPL CREATININE-BSD FRML MDRD: >90 ML/MIN/{1.73_M2}
GLUCOSE SERPL-MCNC: 121 MG/DL (ref 70–99)
HCT VFR BLD AUTO: 32 % (ref 40–53)
HGB BLD-MCNC: 10.4 G/DL (ref 13.3–17.7)
IMM GRANULOCYTES # BLD: 0 10E9/L (ref 0–0.4)
IMM GRANULOCYTES NFR BLD: 0.3 %
LMWH PPP CHRO-ACNC: 0.71 IU/ML
LYMPHOCYTES # BLD AUTO: 0.7 10E9/L (ref 0.8–5.3)
LYMPHOCYTES NFR BLD AUTO: 21.1 %
MCH RBC QN AUTO: 31.9 PG (ref 26.5–33)
MCHC RBC AUTO-ENTMCNC: 32.5 G/DL (ref 31.5–36.5)
MCV RBC AUTO: 98 FL (ref 78–100)
MONOCYTES # BLD AUTO: 0.4 10E9/L (ref 0–1.3)
MONOCYTES NFR BLD AUTO: 10.4 %
NEUTROPHILS # BLD AUTO: 2.2 10E9/L (ref 1.6–8.3)
NEUTROPHILS NFR BLD AUTO: 64 %
NRBC # BLD AUTO: 0 10*3/UL
NRBC BLD AUTO-RTO: 0 /100
PLATELET # BLD AUTO: 178 10E9/L (ref 150–450)
POTASSIUM SERPL-SCNC: 3.4 MMOL/L (ref 3.4–5.3)
PROT SERPL-MCNC: 6.4 G/DL (ref 6.8–8.8)
RBC # BLD AUTO: 3.26 10E12/L (ref 4.4–5.9)
SODIUM SERPL-SCNC: 144 MMOL/L (ref 133–144)
WBC # BLD AUTO: 3.4 10E9/L (ref 4–11)

## 2020-01-13 PROCEDURE — 25800030 ZZH RX IP 258 OP 636: Mod: KQ,ZF | Performed by: PEDIATRICS

## 2020-01-13 PROCEDURE — 25000128 H RX IP 250 OP 636: Mod: ZF | Performed by: PEDIATRICS

## 2020-01-13 PROCEDURE — 25000128 H RX IP 250 OP 636: Mod: ZF

## 2020-01-13 PROCEDURE — 85520 HEPARIN ASSAY: CPT | Performed by: PEDIATRICS

## 2020-01-13 PROCEDURE — 25800030 ZZH RX IP 258 OP 636: Mod: ZF | Performed by: PEDIATRICS

## 2020-01-13 PROCEDURE — 85025 COMPLETE CBC W/AUTO DIFF WBC: CPT | Performed by: PEDIATRICS

## 2020-01-13 PROCEDURE — 96375 TX/PRO/DX INJ NEW DRUG ADDON: CPT

## 2020-01-13 PROCEDURE — 80053 COMPREHEN METABOLIC PANEL: CPT | Performed by: PEDIATRICS

## 2020-01-13 PROCEDURE — 84999 UNLISTED CHEMISTRY PROCEDURE: CPT | Performed by: NURSE PRACTITIONER

## 2020-01-13 PROCEDURE — 96413 CHEMO IV INFUSION 1 HR: CPT

## 2020-01-13 PROCEDURE — 81306 NUDT15 GENE COMMON VARIANTS: CPT | Performed by: NURSE PRACTITIONER

## 2020-01-13 PROCEDURE — 96411 CHEMO IV PUSH ADDL DRUG: CPT

## 2020-01-13 PROCEDURE — 81335 TPMT GENE COM VARIANTS: CPT | Performed by: NURSE PRACTITIONER

## 2020-01-13 RX ORDER — ONDANSETRON 2 MG/ML
INJECTION INTRAMUSCULAR; INTRAVENOUS
Status: COMPLETED
Start: 2020-01-13 | End: 2020-01-13

## 2020-01-13 RX ORDER — HEPARIN SODIUM (PORCINE) LOCK FLUSH IV SOLN 100 UNIT/ML 100 UNIT/ML
SOLUTION INTRAVENOUS
Status: COMPLETED
Start: 2020-01-13 | End: 2020-01-13

## 2020-01-13 RX ORDER — HEPARIN SODIUM (PORCINE) LOCK FLUSH IV SOLN 100 UNIT/ML 100 UNIT/ML
5 SOLUTION INTRAVENOUS
Status: DISCONTINUED | OUTPATIENT
Start: 2020-01-13 | End: 2020-01-13 | Stop reason: HOSPADM

## 2020-01-13 RX ORDER — ONDANSETRON 2 MG/ML
8 INJECTION INTRAMUSCULAR; INTRAVENOUS ONCE
Status: COMPLETED | OUTPATIENT
Start: 2020-01-13 | End: 2020-01-13

## 2020-01-13 RX ADMIN — VINCRISTINE SULFATE 2 MG: 1 INJECTION, SOLUTION INTRAVENOUS at 12:16

## 2020-01-13 RX ADMIN — ONDANSETRON 8 MG: 2 INJECTION INTRAMUSCULAR; INTRAVENOUS at 11:47

## 2020-01-13 RX ADMIN — SODIUM CHLORIDE 100 ML: 9 INJECTION, SOLUTION INTRAVENOUS at 12:15

## 2020-01-13 RX ADMIN — DEXTROSE MONOHYDRATE 50 ML: 50 INJECTION, SOLUTION INTRAVENOUS at 12:15

## 2020-01-13 RX ADMIN — METHOTREXATE 400 MG: 25 INJECTION INTRA-ARTERIAL; INTRAMUSCULAR; INTRATHECAL; INTRAVENOUS at 12:25

## 2020-01-13 RX ADMIN — HEPARIN SODIUM (PORCINE) LOCK FLUSH IV SOLN 100 UNIT/ML 5 ML: 100 SOLUTION at 12:55

## 2020-01-13 RX ADMIN — SODIUM CHLORIDE, PRESERVATIVE FREE 5 ML: 5 INJECTION INTRAVENOUS at 12:55

## 2020-01-13 ASSESSMENT — MIFFLIN-ST. JEOR: SCORE: 1882.13

## 2020-01-13 NOTE — PROGRESS NOTES
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 He is currently day 21 of Interim Maintenance.    With induction, he had a CRu, resolution of lymphadenopathy, but persistence of small pleural effusion (resolved after consolidation). Induction was complicated by development of extensive upper DVT complicated by edema and folliculitis preventing day 29 LP, and consolidation was complicated by severe PEG-asparaginase induced necrotizing pancreatitis, being monitored by Dr. Montgomery. As a result, PEG-asparaginase has been discontinued from his treatment regimen. 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      Patient Active Problem List   Diagnosis     T lymphoblastic lymphoma (H)     DVT (deep venous thrombosis) (H)     Cushingoid side effect of steroids (H)     Anticoagulated     Vitamin D deficiency     Port-A-Cath in place     Immunosuppressed due to chemotherapy     Neuropathic pain     Insomnia due to medical condition     Plan  1. Labs today are good to continue capizzi methotrexate dose escalation. Will also receive vincristine  1. Reviewed anticipatory nausea management, constipation management, and importance of hydration  2. Follow up abdominal CT on 1/23/20 to be reviewed by Dr. Coronel's team. Will plan to repeat his DVT assessment this same day  3. Continue lovenox at current dose. Check Xa level monthly (due mid February). He is aware of the need to hold PM prior and AM of his LPs. Will maintain platelet count > 30K while on anticoagulation.  4. Continue vitamin D, repeat level in early March  5. Day 29 induction LP that was deferred will be made up during maintenance therapy  1. Given significant spinal headache history will plan for IV caffeine following LPs in the future.   6. Given recent pancreatitis and intermediate TPMT  phenotype his 6MP dose was reduced for the 2nd half of Consolidation by 50%. TPMT and [id number] genotype pending.  7. RTC on 1/14/19 for neuropsych, dermatology, PT evaluation as part of comprehensive lymphoma clinic. Next chemotherapy due 1/23/20.  8. Will plan for repeat ECHO in conjunction with cardiology appointment scheduled on 2/4/20 (prior to starting DI phase).     Signed,  Nicho Fischer   Pediatric Hematology/Oncology Fellow    Physician Attestation   I, Ermelinda Sommers MD, MD, saw this patient with the resident and agree with the resident/fellow's findings and plan of care as documented in the note.      I personally reviewed vital signs, medications and labs.    Ermelinda Sommers MD, MD  Date of Service (when I saw the patient): 1/13/20      Primary Care Physician   Rodriguez Montoya    Oncology History  Lazaro Lund is a 19 year old young man with T-cell lymphoblastic lymphoma, pratt stage III. Lazaro presented with chronic cough with progressive orthopnea and was found to have an anterior mediastinal mass and malignant left-sided pleural effusion.  A CT guided biopsy was obtained at [facility] and pathology was consistent with T-cell neoplasm.  He was admitted to peds oncology service 8/30 and started on treatment per [id number].  He had a pleural effusion that required a chest tube and a pericardial effusion that was not drained. His Induction was complicated by cardiac compression secondary to his mass leading to tamponade, this improved through out his hospital stay. His induction course was complicated by extensive bilateral UE DVTs for which anticoagulation was started. At end of induction, he had a CRu response (persistent effusion on imaging). Consolidation was complicated by the development of severe asparaginase induced pancreatitis. Due to this, asparaginase was omitted from his treatment plan. His end of consolidation scan showed resolved pleural effusion and no evidence of  disease.      Interim History  Lazaro presents today for day 21 of interim maintenance     After his last chemotherapy, Lazaro experienced 2-3 days of nausea. Once he started taking zofran and benadryl, this improved a lot. He denied experiencing abdominal pain at this time. He has not had issues with constipation either. His energy is overall good, and he has a good appetite. He is taking his lovenox regularly but missed one day due to bruising at his normal injection sites. He sometimes forgets to take his vitamin D. He is sleeping well and has not had fever.     Past Medical History    I have reviewed this patient's medical history and updated it with pertinent information if needed.   Past Medical History:   Diagnosis Date     Acute necrotizing pancreatitis 11/07/2019    attributed to asparaginase     Acute pancreatitis due to PEGaspariginase therapy  11/15/2019     DVT of upper extremity (deep vein thrombosis) (H) 09/26/2019    Bilateral      Edema of upper extremity 10/17/2019     Folliculitis 10/17/2019     Migraine 2006    have resolved     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       Past Surgical History   I have reviewed this patient's surgical history and updated it with pertinent information if needed.  Past Surgical History:   Procedure Laterality Date     BONE MARROW BIOPSY, BONE SPECIMEN, NEEDLE/TROCAR Left 9/1/2019    Procedure: BIOPSY, BONE MARROW;  Surgeon: Heather Lopez MD;  Location: [facility]     INSERT PICC LINE N/A 8/31/2019    Procedure: INSERTION, PICC;  Surgeon: Michell Keith MD;  [facility]: [facility]     INSERT PORT VASCULAR ACCESS N/A 10/24/2019    Procedure: INSERTION, VASCULAR ACCESS PORT;  Surgeon: Silviano Martins MD;  Location: [facility]      IR CHEST PORT PLACEMENT > 5 YRS OF AGE  10/24/2019     IR CHEST TUBE PLACEMENT NON-TUNNELLED LEFT  8/31/2019     IR PICC PLACEMENT > 5 YRS OF AGE  8/31/2019     IR PORT CHECK RIGHT  11/12/2019     SPINAL PUNCTURE,LUMBAR, INTRATHECAL  CHEMO DELIVERY N/A 8/31/2019    Procedure: LUMBAR PUNCTURE, WITH INTRATHECAL CHEMOTHERAPY ADMINISTRATION;  Surgeon: Heather Lopez MD;  [facility]     SPINAL PUNCTURE,LUMBAR, INTRATHECAL CHEMO DELIVERY N/A 9/9/2019    Procedure: Lumbar Puncture With Intrathecal Chemo;  Surgeon: Alexi Hayes MD;  [facility]     SPINAL PUNCTURE,LUMBAR, INTRATHECAL CHEMO DELIVERY N/A 10/10/2019    Procedure: Lumbar puncture with IT Chemo (CD);  Surgeon: Reynaldo Campuzano MD;  [facility]      SPINAL PUNCTURE,LUMBAR, INTRATHECAL CHEMO DELIVERY N/A 10/17/2019    Procedure: Lumbar puncture with IT Chemo (not CD);  Surgeon: Reynaldo Campuzano MD;  [facility]      SPINAL PUNCTURE,LUMBAR, INTRATHECAL CHEMO DELIVERY N/A 10/24/2019    Procedure: LUMBAR PUNCTURE, WITH INTRATHECAL CHEMOTHERAPY ADMINISTRATION;  Surgeon: Félix Van APRN CNP;  [facility]      SPINAL PUNCTURE,LUMBAR, INTRATHECAL CHEMO DELIVERY N/A 10/31/2019    Procedure: Lumbar puncture with IT Chemo (not CD);  Surgeon: Reynaldo Campuzano MD;  [facility]      SPINAL PUNCTURE,LUMBAR, INTRATHECAL CHEMO DELIVERY N/A 12/19/2019    Procedure: Lumbar puncture with IT Chem (CD);  Surgeon: Félix Van APRN CNP;  [facility]      SPINAL PUNCTURE,LUMBAR, INTRATHECAL CHEMO DELIVERY N/A 12/23/2019    Procedure: Lumbar puncture with IT Chem (CD);  Surgeon: Heather Lopez MD;  [facility]      THORACENTESIS N/A 8/31/2019    Procedure: Thoracentesis;  Surgeon: Michell Keith MD;  [facility]       Medications   Current Outpatient Medications on File Prior to Visit   Medication Sig Dispense Refill     diphenhydrAMINE (BENADRYL) 25 MG capsule Take 1-2 capsules (25-50 mg) by mouth every 6 hours as needed (Breakthrough Nausea and Vomiting ) 30 capsule 1     enoxaparin ANTICOAGULANT (LOVENOX) 100 MG/ML syringe Inject 1 mL (100 mg)  Subcutaneous every 12 hours 180 mL 0     melatonin 3 MG tablet Take 1 tablet (3 mg) by mouth At Bedtime 30 tablet 1     ondansetron (ZOFRAN-ODT) 8 MG ODT tab Take 1 tablet (8 mg) by mouth every 6 hours as needed for nausea 20 tablet 0     pantoprazole (PROTONIX) 40 MG EC tablet Take 1 tablet (40 mg) by mouth every morning (before breakfast) 30 tablet 0     polyethylene glycol (MIRALAX/GLYCOLAX) packet Take 17 g by mouth daily 30 packet 0     scopolamine (TRANSDERM) 1 MG/3DAYS 72 hr patch Place 1 patch onto the skin every 72 hours 10 patch 3     sennosides (SENOKOT) 8.6 MG tablet Take 2 tablets by mouth 2 times daily as needed for constipation 60 each 1     sulfamethoxazole-trimethoprim (BACTRIM DS/SEPTRA DS) 800-160 MG tablet Take 1 tablet by mouth Every Mon, Tues two times daily 16 tablet 3     Vitamin D, Cholecalciferol, 25 MCG (1000 UT) TABS Take 2 tablets (2,000 Units) by mouth daily 60 tablet 3     Allergies   Allergies   Allergen Reactions     Asparaginase Derivatives Other (See Comments)     Severe pancreatitis     No Known Drug Allergies        Social History   I have updated and reviewed the following Social History Narrative:   Pediatric History   Patient Parents     JHONATHAN RODRIGUEZ (Mother)     AVNI RODRIGUEZ (Father)     Other Topics Concern     Not on file   Social History Narrative    Lives at home in [place] with Dad and Step-Mom. Biological mother is involved in his life and accompanied him during this hospitalization. Has 4 step-siblings and 1 biological sibling. 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 Has completed high school.       Family History   I have reviewed this patient's family history and updated it with pertinent information if needed.   Family History   Problem Relation Age of Onset     No Known Problems Mother      No Known Problems Father      Asthma Brother      Thyroid Cancer  Paternal Grandmother      Melanoma Paternal Aunt        Review of Systems   The 10 point Review of Systems is negative other than noted in the HPI or here.     Physical Exam   Vital Signs with Ranges  Temp:  [98  F (36.7  C)] 98  F (36.7  C)  Pulse:  [94] 94  Resp:  [20] 20  BP: (108)/(72) 108/72  SpO2:  [100 %] 100 %    General: Lazaro is alert, interactive and appropriate. He appears well, is upbeat and in no obvious pain.  HEENT: Skull is atrauamatic and normocephalic.  Full head of hair. PERRLA, sclera are non icteric and not injected, EOM are intact. Nares are patent without drainage. Oropharynx clear, no plaques noted. Buccal mucosa and tongue clear. MMM.   Lymph:  Neck is supple without lymphadenopathy.  There is no supraclavicular, axillary or inguinal lymphadenopathy palpated.  Cardiovascular:  HR is normal with regular rhythm, no murmur.  Capillary refill is < 2 seconds. Right arm without edema or erythema.  No pitting edema.   Respiratory: No cough noted. Respirations are easy.  Lungs are clear to auscultation through out.  No crackles or wheezes.  Gastrointestinal:  BS present in all quadrants.  Abdomen is soft and flat. No tenderness with palpation. No hepatosplenomegaly or mass.  Skin: No rashes, bruises or other skin lesions noted.  Neurological:  No focal deficits  Musculoskeletal:  Good strength and ROM in all extremities.  Strong dorsiflexion at ankles and great toes (5/5) bilaterally without any pain at the Achilles.    Data   Results for orders placed or performed in visit on 01/13/20 (from the past 24 hour(s))   CBC with platelets differential   Result Value Ref Range    WBC 3.4 (L) 4.0 - 11.0 10e9/L    RBC Count 3.26 (L) 4.4 - 5.9 10e12/L    Hemoglobin 10.4 (L) 13.3 - 17.7 g/dL    Hematocrit 32.0 (L) 40.0 - 53.0 %    MCV 98 78 - 100 fl    MCH 31.9 26.5 - 33.0 pg    MCHC 32.5 31.5 - 36.5 g/dL    RDW 15.9 (H) 10.0 - 15.0 %    Platelet Count 178 150 - 450 10e9/L    Diff Method Automated Method      % Neutrophils 64.0 %    % Lymphocytes 21.1 %    % Monocytes 10.4 %    % Eosinophils 3.9 %    % Basophils 0.3 %    % Immature Granulocytes 0.3 %    Nucleated RBCs 0 0 /100    Absolute Neutrophil 2.2 1.6 - 8.3 10e9/L    Absolute Lymphocytes 0.7 (L) 0.8 - 5.3 10e9/L    Absolute Monocytes 0.4 0.0 - 1.3 10e9/L    Absolute Eosinophils 0.1 0.0 - 0.7 10e9/L    Absolute Basophils 0.0 0.0 - 0.2 10e9/L    Abs Immature Granulocytes 0.0 0 - 0.4 10e9/L    Absolute Nucleated RBC 0.0    Comprehensive metabolic panel   Result Value Ref Range    Sodium 144 133 - 144 mmol/L    Potassium 3.4 3.4 - 5.3 mmol/L    Chloride 112 (H) 94 - 109 mmol/L    Carbon Dioxide 29 20 - 32 mmol/L    Anion Gap 3 3 - 14 mmol/L    Glucose 121 (H) 70 - 99 mg/dL    Urea Nitrogen 13 7 - 30 mg/dL    Creatinine 0.59 (L) 0.66 - 1.25 mg/dL    GFR Estimate >90 >60 mL/min/[1.73_m2]    GFR Estimate If Black >90 >60 mL/min/[1.73_m2]    Calcium 8.6 8.5 - 10.1 mg/dL    Bilirubin Total 0.2 0.2 - 1.3 mg/dL    Albumin 3.6 3.4 - 5.0 g/dL    Protein Total 6.4 (L) 6.8 - 8.8 g/dL    Alkaline Phosphatase 69 40 - 150 U/L    ALT 67 0 - 70 U/L    AST 17 0 - 45 U/L   Heparin 10a Level   Result Value Ref Range    Heparin 10A Level 0.71 IU/mL

## 2020-01-13 NOTE — LETTER
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 He is currently day  21 of Interim Maintenance.    With induction, he had a CRu, resolution of lymphadenopathy, but persistence of small pleural effusion (resolved after consolidation). Induction was complicated by development of extensive upper DVT complicated by edema and folliculitis preventing day 29 LP, and consolidation was complicated by severe PEG-asparaginase induced necrotizing pancreatitis, being monitored by Dr. Montgomery. As a result, PEG-asparaginase has been discontinued from his treatment regimen. 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      Patient Active Problem List   Diagnosis     T lymphoblastic lymphoma (H)     DVT (deep venous thrombosis) (H)     Cushingoid side effect of steroids (H)     Anticoagulated     Vitamin D deficiency     Port-A-Cath in place     Immunosuppressed due to chemotherapy     Neuropathic pain     Insomnia due to medical condition     Plan  1. Labs today are good to continue capizzi methotrexate dose escalation. Will also receive vincristine  1. Reviewed anticipatory nausea management, constipation management, and importance of hydration  2. Follow up abdominal CT on 1/23/20 to be reviewed by Dr. Coronel's team. Will plan to repeat his DVT assessment this same day  3. Continue lovenox at current dose. Check Xa level monthly (due mid February). He is aware of the need to hold PM prior and AM of his LPs. Will maintain platelet count > 30K while on anticoagulation.  4. Continue vitamin D, repeat level in early March  5. Day 29 induction LP that was deferred will be made up during maintenance therapy  1. Given significant spinal headache history will plan for IV caffeine  following LPs in the future.   6. Given recent pancreatitis and intermediate TPMT phenotype his 6MP dose was reduced for the 2nd half of Consolidation by 50%. TPMT and NUDT15 genotype pending.  7. RTC on 1/14/19 for neuropsych, dermatology, PT evaluation as part of comprehensive lymphoma clinic. Next chemotherapy due 1/23/20.  8. Will plan for repeat ECHO in conjunction with cardiology appointment scheduled on 2/4/20 (prior to starting DI phase).     Signed,  Nicho Fischer   Pediatric Hematology/Oncology Fellow    Physician Attestation   I, Ermelinda Sommers MD, MD, saw this patient with the resident and agree with the resident/fellow's findings and plan of care as documented in the note.      I personally reviewed vital signs, medications and labs.    Ermelinda Sommers MD, MD  Date of Service (when I saw the patient): 1/13/20      Primary Care Physician   Rodriguez BERMUDEZ Jan    Oncology History  Lazaro Lund is a 19 year old young man with T-cell lymphoblastic lymphoma, pratt stage III. Lazaro presented with chronic cough with progressive orthopnea and was found to have an anterior mediastinal mass and malignant left-sided pleural effusion.  A CT guided biopsy was obtained at [facility] and pathology was consistent with T-cell neoplasm.  He was admitted to [facility] oncology service 8/30 and started on treatment per [id number].  He had a pleural effusion that required a chest tube and a pericardial effusion that was not drained. His Induction was complicated by cardiac compression secondary to his mass leading to tamponade, this improved through out his hospital stay. His induction course was complicated by extensive bilateral UE DVTs for which anticoagulation was started. At end of induction, he had a CRu response (persistent effusion on imaging). Consolidation was complicated by the development of severe asparaginase induced pancreatitis. Due to this, asparaginase was omitted from his treatment plan. His  end of consolidation scan showed resolved pleural effusion and no evidence of disease.      Interim History  Lazaro presents today for day 21 of interim maintenance     After his last chemotherapy, Lazaro experienced 2-3 days of nausea. Once he started taking zofran and benadryl, this improved a lot. He denied experiencing abdominal pain at this time. He has not had issues with constipation either. His energy is overall good, and he has a good appetite. He is taking his lovenox regularly but missed one day due to bruising at his normal injection sites. He sometimes forgets to take his vitamin D. He is sleeping well and has not had fever.     Past Medical History    I have reviewed this patient's medical history and updated it with pertinent information if needed.   Past Medical History:   Diagnosis Date     Acute necrotizing pancreatitis 11/07/2019    attributed to asparaginase     Acute pancreatitis due to PEGaspariginase therapy  11/15/2019     DVT of upper extremity (deep vein thrombosis) (H) 09/26/2019    Bilateral      Edema of upper extremity 10/17/2019     Folliculitis 10/17/2019     Migraine 2006    have resolved     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       Past Surgical History   I have reviewed this patient's surgical history and updated it with pertinent information if needed.  Past Surgical History:   Procedure Laterality Date     BONE MARROW BIOPSY, BONE SPECIMEN, NEEDLE/TROCAR Left 9/1/2019    Procedure: BIOPSY, BONE MARROW;  Surgeon: Heather Lopez MD;  [facility]     INSERT PICC LINE N/A 8/31/2019    Procedure: INSERTION, PICC;  Surgeon: Michell Keith MD;  [facility]     INSERT PORT VASCULAR ACCESS N/A 10/24/2019    Procedure: INSERTION, VASCULAR ACCESS PORT;  Surgeon: Silviano Martins MD;  [facility]      IR CHEST PORT PLACEMENT > 5 YRS OF AGE  10/24/2019     IR CHEST TUBE PLACEMENT NON-TUNNELLED LEFT  8/31/2019     IR PICC PLACEMENT > 5 YRS OF AGE   8/31/2019     IR PORT CHECK RIGHT  11/12/2019     SPINAL PUNCTURE,LUMBAR, INTRATHECAL CHEMO DELIVERY N/A 8/31/2019    Procedure: LUMBAR PUNCTURE, WITH INTRATHECAL CHEMOTHERAPY ADMINISTRATION;  Surgeon: Heather Lopez MD;  [facility]     SPINAL PUNCTURE,LUMBAR, INTRATHECAL CHEMO DELIVERY N/A 9/9/2019    Procedure: Lumbar Puncture With Intrathecal Chemo;  Surgeon: Alexi Hayes MD;  [facility]     SPINAL PUNCTURE,LUMBAR, INTRATHECAL CHEMO DELIVERY N/A 10/10/2019    Procedure: Lumbar puncture with IT Chemo (CD);  Surgeon: Reynaldo Campuzano MD;  [facility]      SPINAL PUNCTURE,LUMBAR, INTRATHECAL CHEMO DELIVERY N/A 10/17/2019    Procedure: Lumbar puncture with IT Chemo (not CD);  Surgeon: Reynaldo Campuzano MD;  [facility]      SPINAL PUNCTURE,LUMBAR, INTRATHECAL CHEMO DELIVERY N/A 10/24/2019    Procedure: LUMBAR PUNCTURE, WITH INTRATHECAL CHEMOTHERAPY ADMINISTRATION;  Surgeon: Félix Van APRN CNP;  [facility]      SPINAL PUNCTURE,LUMBAR, INTRATHECAL CHEMO DELIVERY N/A 10/31/2019    Procedure: Lumbar puncture with IT Chemo (not CD);  Surgeon: Reynaldo Campuzano MD;  [facility]      SPINAL PUNCTURE,LUMBAR, INTRATHECAL CHEMO DELIVERY N/A 12/19/2019    Procedure: Lumbar puncture with IT Chem (CD);  Surgeon: Félix Van APRN CNP;  [facility]      SPINAL PUNCTURE,LUMBAR, INTRATHECAL CHEMO DELIVERY N/A 12/23/2019    Procedure: Lumbar puncture with IT Chem (CD);  Surgeon: Heather Lopez MD;  [facility]      THORACENTESIS N/A 8/31/2019    Procedure: Thoracentesis;  Surgeon: Michell Keith MD;  [facility]       Medications   Current Outpatient Medications on File Prior to Visit   Medication Sig Dispense Refill     diphenhydrAMINE (BENADRYL) 25 MG capsule Take 1-2 capsules (25-50 mg) by mouth every 6 hours as needed (Breakthrough Nausea and Vomiting ) 30  capsule 1     enoxaparin ANTICOAGULANT (LOVENOX) 100 MG/ML syringe Inject 1 mL (100 mg) Subcutaneous every 12 hours 180 mL 0     melatonin 3 MG tablet Take 1 tablet (3 mg) by mouth At Bedtime 30 tablet 1     ondansetron (ZOFRAN-ODT) 8 MG ODT tab Take 1 tablet (8 mg) by mouth every 6 hours as needed for nausea 20 tablet 0     pantoprazole (PROTONIX) 40 MG EC tablet Take 1 tablet (40 mg) by mouth every morning (before breakfast) 30 tablet 0     polyethylene glycol (MIRALAX/GLYCOLAX) packet Take 17 g by mouth daily 30 packet 0     scopolamine (TRANSDERM) 1 MG/3DAYS 72 hr patch Place 1 patch onto the skin every 72 hours 10 patch 3     sennosides (SENOKOT) 8.6 MG tablet Take 2 tablets by mouth 2 times daily as needed for constipation 60 each 1     sulfamethoxazole-trimethoprim (BACTRIM DS/SEPTRA DS) 800-160 MG tablet Take 1 tablet by mouth Every Mon, Tues two times daily 16 tablet 3     Vitamin D, Cholecalciferol, 25 MCG (1000 UT) TABS Take 2 tablets (2,000 Units) by mouth daily 60 tablet 3     Allergies   Allergies   Allergen Reactions     Asparaginase Derivatives Other (See Comments)     Severe pancreatitis     No Known Drug Allergies        Social History   I have updated and reviewed the following Social History Narrative:   Pediatric History   Patient Parents     SHARONKADEEMJHONATHAN (Mother)     JENNIFERAVNI (Father)     Other Topics Concern     Not on file   Social History Narrative    Lives at home in [place] with Dad and Step-Mom. Biological mother is involved in his life and accompanied him during this hospitalization. Has 4 step-siblings and 1 biological sibling. 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 Has completed high school.       Family History   I have reviewed this patient's family history and updated it with pertinent information if needed.   Family History   Problem Relation Age of Onset     No  Known Problems Mother      No Known Problems Father      Asthma Brother      Thyroid Cancer Paternal Grandmother      Melanoma Paternal Aunt        Review of Systems   The 10 point Review of Systems is negative other than noted in the HPI or here.     Physical Exam   Vital Signs with Ranges  Temp:  [98  F (36.7  C)] 98  F (36.7  C)  Pulse:  [94] 94  Resp:  [20] 20  BP: (108)/(72) 108/72  SpO2:  [100 %] 100 %    General: Lazaro is alert, interactive and appropriate. He appears well, is upbeat and in no obvious pain.  HEENT: Skull is atrauamatic and normocephalic.  Full head of hair. PERRLA, sclera are non icteric and not injected, EOM are intact. Nares are patent without drainage. Oropharynx clear, no plaques noted. Buccal mucosa and tongue clear. MMM.   Lymph:  Neck is supple without lymphadenopathy.  There is no supraclavicular, axillary or inguinal lymphadenopathy palpated.  Cardiovascular:  HR is normal with regular rhythm, no murmur.  Capillary refill is < 2 seconds. Right arm without edema or erythema.  No pitting edema.   Respiratory: No cough noted. Respirations are easy.  Lungs are clear to auscultation through out.  No crackles or wheezes.  Gastrointestinal:  BS present in all quadrants.  Abdomen is soft and flat. No tenderness with palpation. No hepatosplenomegaly or mass.  Skin: No rashes, bruises or other skin lesions noted.  Neurological:   No focal deficits  Musculoskeletal:  Good strength and ROM in all extremities.  Strong dorsiflexion at ankles and great toes (5/5) bilaterally without any pain at the Achilles.    Data   Results for orders placed or performed in visit on 01/13/20 (from the past 24 hour(s))   CBC with platelets differential   Result Value Ref Range    WBC 3.4 (L) 4.0 - 11.0 10e9/L    RBC Count 3.26 (L) 4.4 - 5.9 10e12/L    Hemoglobin 10.4 (L) 13.3 - 17.7 g/dL    Hematocrit 32.0 (L) 40.0 - 53.0 %    MCV 98 78 - 100 fl    MCH 31.9 26.5 - 33.0 pg    MCHC 32.5 31.5 - 36.5 g/dL    RDW 15.9  (H) 10.0 - 15.0 %    Platelet Count 178 150 - 450 10e9/L    Diff Method Automated Method     % Neutrophils 64.0 %    % Lymphocytes 21.1 %    % Monocytes 10.4 %    % Eosinophils 3.9 %    % Basophils 0.3 %    % Immature Granulocytes 0.3 %    Nucleated RBCs 0 0 /100    Absolute Neutrophil 2.2 1.6 - 8.3 10e9/L    Absolute Lymphocytes 0.7 (L) 0.8 - 5.3 10e9/L    Absolute Monocytes 0.4 0.0 - 1.3 10e9/L    Absolute Eosinophils 0.1 0.0 - 0.7 10e9/L    Absolute Basophils 0.0 0.0 - 0.2 10e9/L    Abs Immature Granulocytes 0.0 0 - 0.4 10e9/L    Absolute Nucleated RBC 0.0    Comprehensive metabolic panel   Result Value Ref Range    Sodium 144 133 - 144 mmol/L    Potassium 3.4 3.4 - 5.3 mmol/L    Chloride 112 (H) 94 - 109 mmol/L    Carbon Dioxide 29 20 - 32 mmol/L    Anion Gap 3 3 - 14 mmol/L    Glucose 121 (H) 70 - 99 mg/dL    Urea Nitrogen 13 7 - 30 mg/dL    Creatinine 0.59 (L) 0.66 - 1.25 mg/dL    GFR Estimate >90 >60 mL/min/[1.73_m2]    GFR Estimate If Black >90 >60 mL/min/[1.73_m2]    Calcium 8.6 8.5 - 10.1 mg/dL    Bilirubin Total 0.2 0.2 - 1.3 mg/dL    Albumin 3.6 3.4 - 5.0 g/dL    Protein Total 6.4 (L) 6.8 - 8.8 g/dL    Alkaline Phosphatase 69 40 - 150 U/L    ALT 67 0 - 70 U/L    AST 17 0 - 45 U/L   Heparin 10a Level   Result Value Ref Range    Heparin 10A Level 0.71 IU/mL           Nicho Fischer MD

## 2020-01-13 NOTE — PROGRESS NOTES
Infusion Nursing Note    Lazaro Lund Presents to [facility] center today for: C1D21 VCR/MTX    Due to : T lymphoblastic lymphoma (H)    Patient seen by Provider: Yes: Dr. Sommers and Dr. Fischer     present during visit today: Not Applicable    Note: Pt denies any fevers and/or infections.     Intravenous Access: Yes: Port accessed using sterile technique. Blood return noted; labs drawn as ordered.    Treatment conditions: Platelet and ANC    Parameters Met for treatment per Dr. Fischer    Pre-Meds:Yes, 8mg IV Zofran    Education provided: Yes: about VCR/MTX    Post Infusion Assessment: Vincristine given to gravity; blood return noted pre/mid/post infusion. Methotrexate infusion completed without complication; blood return noted pre/post infusion. VSS. Port heparin locked and de-accessed.     Discharge Plan:   Patient left clinic in stable condition at end of cares.

## 2020-01-14 LAB — MISCELLANEOUS TEST: NORMAL

## 2020-01-20 RX ORDER — SULFAMETHOXAZOLE/TRIMETHOPRIM 800-160 MG
1 TABLET ORAL
Qty: 16 TABLET | Refills: 3 | Status: SHIPPED | OUTPATIENT
Start: 2020-01-20 | End: 2020-02-18

## 2020-01-21 LAB
RESULT: NORMAL
SEND OUTS MISC TEST CODE: NORMAL
SEND OUTS MISC TEST SPECIMEN: NORMAL
TEST NAME: NORMAL

## 2020-01-22 ENCOUNTER — ANESTHESIA EVENT (OUTPATIENT)
Dept: PEDIATRICS | Facility: CLINIC | Age: 20
End: 2020-01-22
Payer: MEDICAID

## 2020-01-22 ASSESSMENT — ENCOUNTER SYMPTOMS: APNEA: 0

## 2020-01-23 ENCOUNTER — HOSPITAL ENCOUNTER (OUTPATIENT)
Dept: CT IMAGING | Facility: CLINIC | Age: 20
End: 2020-01-23
Attending: INTERNAL MEDICINE | Admitting: PEDIATRICS
Payer: MEDICAID

## 2020-01-23 ENCOUNTER — OFFICE VISIT (OUTPATIENT)
Dept: PEDIATRIC HEMATOLOGY/ONCOLOGY | Facility: CLINIC | Age: 20
End: 2020-01-23
Attending: PEDIATRICS
Payer: MEDICAID

## 2020-01-23 ENCOUNTER — HOSPITAL ENCOUNTER (OUTPATIENT)
Dept: ULTRASOUND IMAGING | Facility: CLINIC | Age: 20
End: 2020-01-23
Attending: PEDIATRICS | Admitting: PEDIATRICS
Payer: MEDICAID

## 2020-01-23 ENCOUNTER — INFUSION THERAPY VISIT (OUTPATIENT)
Dept: INFUSION THERAPY | Facility: CLINIC | Age: 20
End: 2020-01-23
Attending: PEDIATRICS
Payer: MEDICAID

## 2020-01-23 ENCOUNTER — ANESTHESIA (OUTPATIENT)
Dept: PEDIATRICS | Facility: CLINIC | Age: 20
End: 2020-01-23
Payer: MEDICAID

## 2020-01-23 ENCOUNTER — HOSPITAL ENCOUNTER (OUTPATIENT)
Facility: CLINIC | Age: 20
Discharge: HOME | End: 2020-01-23
Attending: PEDIATRICS | Admitting: PEDIATRICS
Payer: MEDICAID

## 2020-01-23 VITALS
OXYGEN SATURATION: 98 % | HEART RATE: 83 BPM | RESPIRATION RATE: 12 BRPM | DIASTOLIC BLOOD PRESSURE: 57 MMHG | SYSTOLIC BLOOD PRESSURE: 97 MMHG | HEIGHT: 73 IN | BODY MASS INDEX: 24.16 KG/M2 | TEMPERATURE: 97.5 F | WEIGHT: 182.32 LBS

## 2020-01-23 VITALS
OXYGEN SATURATION: 98 % | SYSTOLIC BLOOD PRESSURE: 130 MMHG | RESPIRATION RATE: 18 BRPM | TEMPERATURE: 98 F | HEART RATE: 78 BPM | DIASTOLIC BLOOD PRESSURE: 87 MMHG

## 2020-01-23 DIAGNOSIS — C83.50 T LYMPHOBLASTIC LYMPHOMA (H): Primary | ICD-10-CM

## 2020-01-23 DIAGNOSIS — I82.623 ACUTE DEEP VEIN THROMBOSIS (DVT) OF OTHER VEIN OF BOTH UPPER EXTREMITIES (H): ICD-10-CM

## 2020-01-23 DIAGNOSIS — K85.31 DRUG-INDUCED ACUTE PANCREATITIS WITH UNINFECTED NECROSIS: ICD-10-CM

## 2020-01-23 LAB
ALBUMIN SERPL-MCNC: 4.2 G/DL (ref 3.4–5)
ALP SERPL-CCNC: 74 U/L (ref 40–150)
ALT SERPL W P-5'-P-CCNC: 51 U/L (ref 0–70)
ANION GAP SERPL CALCULATED.3IONS-SCNC: 3 MMOL/L (ref 3–14)
AST SERPL W P-5'-P-CCNC: 19 U/L (ref 0–45)
BASOPHILS # BLD AUTO: 0 10E9/L (ref 0–0.2)
BASOPHILS NFR BLD AUTO: 0.7 %
BILIRUB SERPL-MCNC: 0.2 MG/DL (ref 0.2–1.3)
BUN SERPL-MCNC: 8 MG/DL (ref 7–30)
CALCIUM SERPL-MCNC: 9.1 MG/DL (ref 8.5–10.1)
CHLORIDE SERPL-SCNC: 108 MMOL/L (ref 94–109)
CO2 SERPL-SCNC: 29 MMOL/L (ref 20–32)
CREAT SERPL-MCNC: 0.73 MG/DL (ref 0.66–1.25)
DIFFERENTIAL METHOD BLD: ABNORMAL
EOSINOPHIL # BLD AUTO: 0.3 10E9/L (ref 0–0.7)
EOSINOPHIL NFR BLD AUTO: 7.7 %
ERYTHROCYTE [DISTWIDTH] IN BLOOD BY AUTOMATED COUNT: 15 % (ref 10–15)
GFR SERPL CREATININE-BSD FRML MDRD: >90 ML/MIN/{1.73_M2}
GLUCOSE SERPL-MCNC: 103 MG/DL (ref 70–99)
HCT VFR BLD AUTO: 36.1 % (ref 40–53)
HGB BLD-MCNC: 12 G/DL (ref 13.3–17.7)
IMM GRANULOCYTES # BLD: 0 10E9/L (ref 0–0.4)
IMM GRANULOCYTES NFR BLD: 1 %
LYMPHOCYTES # BLD AUTO: 1.1 10E9/L (ref 0.8–5.3)
LYMPHOCYTES NFR BLD AUTO: 25.7 %
MCH RBC QN AUTO: 31.9 PG (ref 26.5–33)
MCHC RBC AUTO-ENTMCNC: 33.2 G/DL (ref 31.5–36.5)
MCV RBC AUTO: 96 FL (ref 78–100)
MONOCYTES # BLD AUTO: 0.5 10E9/L (ref 0–1.3)
MONOCYTES NFR BLD AUTO: 12 %
NEUTROPHILS # BLD AUTO: 2.2 10E9/L (ref 1.6–8.3)
NEUTROPHILS NFR BLD AUTO: 52.9 %
NRBC # BLD AUTO: 0 10*3/UL
NRBC BLD AUTO-RTO: 0 /100
PLATELET # BLD AUTO: 310 10E9/L (ref 150–450)
POTASSIUM SERPL-SCNC: 4.1 MMOL/L (ref 3.4–5.3)
PROT SERPL-MCNC: 7.2 G/DL (ref 6.8–8.8)
RBC # BLD AUTO: 3.76 10E12/L (ref 4.4–5.9)
SODIUM SERPL-SCNC: 140 MMOL/L (ref 133–144)
WBC # BLD AUTO: 4.2 10E9/L (ref 4–11)

## 2020-01-23 PROCEDURE — 25800030 ZZH RX IP 258 OP 636: Mod: ZF | Performed by: PEDIATRICS

## 2020-01-23 PROCEDURE — 96411 CHEMO IV PUSH ADDL DRUG: CPT

## 2020-01-23 PROCEDURE — 25000128 H RX IP 250 OP 636: Performed by: PEDIATRICS

## 2020-01-23 PROCEDURE — 37000008 ZZH ANESTHESIA TECHNICAL FEE, 1ST 30 MIN: Performed by: PEDIATRICS

## 2020-01-23 PROCEDURE — 25800030 ZZH RX IP 258 OP 636: Performed by: NURSE ANESTHETIST, CERTIFIED REGISTERED

## 2020-01-23 PROCEDURE — 96365 THER/PROPH/DIAG IV INF INIT: CPT | Performed by: PEDIATRICS

## 2020-01-23 PROCEDURE — 89050 BODY FLUID CELL COUNT: CPT | Performed by: PEDIATRICS

## 2020-01-23 PROCEDURE — 40001011 ZZH STATISTIC PRE-PROCEDURE NURSING ASSESSMENT: Performed by: PEDIATRICS

## 2020-01-23 PROCEDURE — 96375 TX/PRO/DX INJ NEW DRUG ADDON: CPT | Mod: 59

## 2020-01-23 PROCEDURE — 74177 CT ABD & PELVIS W/CONTRAST: CPT

## 2020-01-23 PROCEDURE — 40000557 ZZH STATISTIC PORT-A-CATH ACCESS/FLUSHING

## 2020-01-23 PROCEDURE — 25800030 ZZH RX IP 258 OP 636: Performed by: PEDIATRICS

## 2020-01-23 PROCEDURE — 96413 CHEMO IV INFUSION 1 HR: CPT

## 2020-01-23 PROCEDURE — 96450 CHEMOTHERAPY INTO CNS: CPT | Performed by: PEDIATRICS

## 2020-01-23 PROCEDURE — 96366 THER/PROPH/DIAG IV INF ADDON: CPT | Mod: 59 | Performed by: PEDIATRICS

## 2020-01-23 PROCEDURE — 25000128 H RX IP 250 OP 636: Performed by: NURSE ANESTHETIST, CERTIFIED REGISTERED

## 2020-01-23 PROCEDURE — 85025 COMPLETE CBC W/AUTO DIFF WBC: CPT | Performed by: PEDIATRICS

## 2020-01-23 PROCEDURE — 25000125 ZZHC RX 250

## 2020-01-23 PROCEDURE — 25000128 H RX IP 250 OP 636

## 2020-01-23 PROCEDURE — 25000125 ZZHC RX 250: Performed by: INTERNAL MEDICINE

## 2020-01-23 PROCEDURE — 36591 DRAW BLOOD OFF VENOUS DEVICE: CPT | Performed by: PEDIATRICS

## 2020-01-23 PROCEDURE — 80053 COMPREHEN METABOLIC PANEL: CPT | Performed by: PEDIATRICS

## 2020-01-23 PROCEDURE — 25500064 ZZH RX 255 OP 636: Performed by: INTERNAL MEDICINE

## 2020-01-23 PROCEDURE — 40000165 ZZH STATISTIC POST-PROCEDURE RECOVERY CARE: Performed by: PEDIATRICS

## 2020-01-23 PROCEDURE — 25000128 H RX IP 250 OP 636: Mod: ZF | Performed by: PEDIATRICS

## 2020-01-23 PROCEDURE — 25000128 H RX IP 250 OP 636: Mod: ZF

## 2020-01-23 PROCEDURE — 25000125 ZZHC RX 250: Performed by: PEDIATRICS

## 2020-01-23 RX ORDER — ACETAMINOPHEN 325 MG/1
650 TABLET ORAL ONCE
Status: DISCONTINUED | OUTPATIENT
Start: 2020-01-23 | End: 2020-01-23 | Stop reason: HOSPADM

## 2020-01-23 RX ORDER — HEPARIN SODIUM,PORCINE 10 UNIT/ML
VIAL (ML) INTRAVENOUS
Status: COMPLETED
Start: 2020-01-23 | End: 2020-01-23

## 2020-01-23 RX ORDER — ONDANSETRON 2 MG/ML
8 INJECTION INTRAMUSCULAR; INTRAVENOUS ONCE
Status: COMPLETED | OUTPATIENT
Start: 2020-01-23 | End: 2020-01-23

## 2020-01-23 RX ORDER — SODIUM CHLORIDE, SODIUM LACTATE, POTASSIUM CHLORIDE, CALCIUM CHLORIDE 600; 310; 30; 20 MG/100ML; MG/100ML; MG/100ML; MG/100ML
INJECTION, SOLUTION INTRAVENOUS CONTINUOUS PRN
Status: DISCONTINUED | OUTPATIENT
Start: 2020-01-23 | End: 2020-01-23

## 2020-01-23 RX ORDER — HEPARIN SODIUM,PORCINE 10 UNIT/ML
5 VIAL (ML) INTRAVENOUS ONCE
Status: DISCONTINUED | OUTPATIENT
Start: 2020-01-23 | End: 2020-01-23 | Stop reason: HOSPADM

## 2020-01-23 RX ORDER — HEPARIN SODIUM (PORCINE) LOCK FLUSH IV SOLN 100 UNIT/ML 100 UNIT/ML
SOLUTION INTRAVENOUS
Status: COMPLETED
Start: 2020-01-23 | End: 2020-01-23

## 2020-01-23 RX ORDER — IOPAMIDOL 612 MG/ML
100 INJECTION, SOLUTION INTRAVASCULAR ONCE
Status: COMPLETED | OUTPATIENT
Start: 2020-01-23 | End: 2020-01-23

## 2020-01-23 RX ORDER — HEPARIN SODIUM (PORCINE) LOCK FLUSH IV SOLN 100 UNIT/ML 100 UNIT/ML
5 SOLUTION INTRAVENOUS
Status: DISCONTINUED | OUTPATIENT
Start: 2020-01-23 | End: 2020-01-23 | Stop reason: HOSPADM

## 2020-01-23 RX ORDER — PROPOFOL 10 MG/ML
INJECTION, EMULSION INTRAVENOUS PRN
Status: DISCONTINUED | OUTPATIENT
Start: 2020-01-23 | End: 2020-01-23

## 2020-01-23 RX ORDER — LIDOCAINE 40 MG/G
2.5 CREAM TOPICAL
Status: DISCONTINUED | OUTPATIENT
Start: 2020-01-23 | End: 2020-01-23 | Stop reason: HOSPADM

## 2020-01-23 RX ORDER — ONDANSETRON 2 MG/ML
INJECTION INTRAMUSCULAR; INTRAVENOUS
Status: COMPLETED
Start: 2020-01-23 | End: 2020-01-23

## 2020-01-23 RX ORDER — CAFFEINE AND SODIUM BENZOATE 125 MG/ML
500 INJECTION, SOLUTION INTRAMUSCULAR; INTRAVENOUS ONCE
Status: DISCONTINUED | OUTPATIENT
Start: 2020-01-23 | End: 2020-01-23

## 2020-01-23 RX ORDER — PROPOFOL 10 MG/ML
INJECTION, EMULSION INTRAVENOUS CONTINUOUS PRN
Status: DISCONTINUED | OUTPATIENT
Start: 2020-01-23 | End: 2020-01-23

## 2020-01-23 RX ORDER — LIDOCAINE 40 MG/G
CREAM TOPICAL
Status: COMPLETED
Start: 2020-01-23 | End: 2020-01-23

## 2020-01-23 RX ADMIN — PROPOFOL 300 MCG/KG/MIN: 10 INJECTION, EMULSION INTRAVENOUS at 12:17

## 2020-01-23 RX ADMIN — PROPOFOL 100 MG: 10 INJECTION, EMULSION INTRAVENOUS at 12:17

## 2020-01-23 RX ADMIN — METHOTREXATE 500 MG: 25 INJECTION, SOLUTION INTRA-ARTERIAL; INTRAMUSCULAR; INTRATHECAL; INTRAVENOUS at 15:13

## 2020-01-23 RX ADMIN — VINCRISTINE SULFATE 2 MG: 1 INJECTION, SOLUTION INTRAVENOUS at 15:05

## 2020-01-23 RX ADMIN — IOPAMIDOL 90 ML: 612 INJECTION, SOLUTION INTRAVENOUS at 11:06

## 2020-01-23 RX ADMIN — Medication 50 UNITS: at 13:46

## 2020-01-23 RX ADMIN — ONDANSETRON 8 MG: 2 INJECTION INTRAMUSCULAR; INTRAVENOUS at 14:59

## 2020-01-23 RX ADMIN — HEPARIN SODIUM (PORCINE) LOCK FLUSH IV SOLN 100 UNIT/ML 500 UNITS: 100 SOLUTION at 15:16

## 2020-01-23 RX ADMIN — METHOTREXATE: 25 INJECTION INTRA-ARTERIAL; INTRAMUSCULAR; INTRATHECAL; INTRAVENOUS at 12:29

## 2020-01-23 RX ADMIN — CAFFEINE AND SODIUM BENZOATE 500 MG: 125 INJECTION, SOLUTION INTRAMUSCULAR; INTRAVENOUS at 12:40

## 2020-01-23 RX ADMIN — SODIUM CHLORIDE 62 ML: 9 INJECTION, SOLUTION INTRAVENOUS at 11:06

## 2020-01-23 RX ADMIN — HEPARIN 500 UNITS: 100 SYRINGE at 15:16

## 2020-01-23 RX ADMIN — SODIUM CHLORIDE, POTASSIUM CHLORIDE, SODIUM LACTATE AND CALCIUM CHLORIDE: 600; 310; 30; 20 INJECTION, SOLUTION INTRAVENOUS at 12:17

## 2020-01-23 ASSESSMENT — MIFFLIN-ST. JEOR: SCORE: 1882

## 2020-01-23 NOTE — ANESTHESIA POSTPROCEDURE EVALUATION
Anesthesia POST Procedure Evaluation    Patient: Lazaro Lund   MRN:     [id number] Gender:   male   Age:    19 year old :      2000        Preoperative Diagnosis: lymphoma   Procedure(s):  Lumbar puncture with IT Chem (CD)   Postop Comments: No value filed.       Anesthesia Type:  Not documented  General    Reportable Event: NO     PAIN: Uncomplicated   Sign Out status: Comfortable, Well controlled pain     PONV: No PONV   Sign Out status:  No Nausea or Vomiting     Neuro/Psych: Uneventful perioperative course   Sign Out Status: Preoperative baseline; Age appropriate mentation     Airway/Resp.: Uneventful perioperative course   Sign Out Status: Non labored breathing, age appropriate RR; Resp. Status within EXPECTED Parameters     CV: Uneventful perioperative course   Sign Out status: Appropriate BP and perfusion indices; Appropriate HR/Rhythm     Disposition:   Sign Out in:  Peds sedation  Disposition:  Home  Recovery Course: Uneventful  Follow-Up: Not required           Last Anesthesia Record Vitals:  CRNA VITALS  2020 1200 - 2020 1300      2020             Temp:  36.4  C (97.5  F)          Last PACU Vitals:  Vitals Value Taken Time   BP 98/55 2020 12:48 PM   Temp 36.3  C (97.3  F) 2020 12:32 PM   Pulse 77 2020 12:48 PM   Resp 17 2020 12:49 PM   SpO2 97 % 2020 12:49 PM   Temp src     NIBP     Pulse     SpO2     Resp     Temp 36.4  C (97.5  F) 2020 12:32 PM   Ht Rate     Temp 2     Vitals shown include unvalidated device data.      Electronically Signed By: Toña Alicea MD, 2020, 1:17 PM

## 2020-01-23 NOTE — ANESTHESIA PREPROCEDURE EVALUATION
Anesthesia Pre-Procedure Evaluation    Patient: Lazaro Lund   MRN:     [id number] Gender:   male   Age:    19 year old :      2000        Preoperative Diagnosis: lymphoma   Procedure(s):  Lumbar puncture with IT Chemo (not CD)     Past Medical History:   Diagnosis Date     Acute necrotizing pancreatitis 2019    attributed to asparaginase     Acute pancreatitis due to PEGaspariginase therapy  11/15/2019     DVT of upper extremity (deep vein thrombosis) (H) 2019    Bilateral      Edema of upper extremity 10/17/2019     Folliculitis 10/17/2019     Migraine 2006    have resolved     T lymphoblastic lymphoma (H) 2019      Past Surgical History:   Procedure Laterality Date     BONE MARROW BIOPSY, BONE SPECIMEN, NEEDLE/TROCAR Left 2019    Procedure: BIOPSY, BONE MARROW;  Surgeon: Heather Lopez MD;  [facility]     INSERT PICC LINE N/A 2019    Procedure: INSERTION, PICC;  Surgeon: Michell Keith MD;  [facility]     INSERT PORT VASCULAR ACCESS N/A 10/24/2019    Procedure: INSERTION, VASCULAR ACCESS PORT;  Surgeon: Silviano Martins MD;  [facility]      IR CHEST PORT PLACEMENT > 5 YRS OF AGE  10/24/2019     IR CHEST TUBE PLACEMENT NON-TUNNELLED LEFT  2019     IR PICC PLACEMENT > 5 YRS OF AGE  2019     IR PORT CHECK RIGHT  2019     SPINAL PUNCTURE,LUMBAR, INTRATHECAL CHEMO DELIVERY N/A 2019    Procedure: LUMBAR PUNCTURE, WITH INTRATHECAL CHEMOTHERAPY ADMINISTRATION;  Surgeon: Heather Lopez MD;  [facility]     SPINAL PUNCTURE,LUMBAR, INTRATHECAL CHEMO DELIVERY N/A 2019    Procedure: Lumbar Puncture With Intrathecal Chemo;  Surgeon: Alexi Hayes MD;  [facility]     SPINAL PUNCTURE,LUMBAR, INTRATHECAL CHEMO DELIVERY N/A 10/10/2019    Procedure: Lumbar puncture with IT Chemo (CD);  Surgeon: Reynaldo Campuzano MD;  [facility]      SPINAL PUNCTURE,LUMBAR, INTRATHECAL CHEMO DELIVERY  N/A 10/17/2019    Procedure: Lumbar puncture with IT Chemo (not CD);  Surgeon: Reynaldo Campuzano MD;  [facility]      SPINAL PUNCTURE,LUMBAR, INTRATHECAL CHEMO DELIVERY N/A 10/24/2019    Procedure: LUMBAR PUNCTURE, WITH INTRATHECAL CHEMOTHERAPY ADMINISTRATION;  Surgeon: Félix Van APRN CNP;  [facility]      SPINAL PUNCTURE,LUMBAR, INTRATHECAL CHEMO DELIVERY N/A 10/31/2019    Procedure: Lumbar puncture with IT Chemo (not CD);  Surgeon: Reynaldo Campuzano MD;  [facility]      SPINAL PUNCTURE,LUMBAR, INTRATHECAL CHEMO DELIVERY N/A 12/19/2019    Procedure: Lumbar puncture with IT Chem (CD);  Surgeon: Félix Van APRN CNP;  [facility]      SPINAL PUNCTURE,LUMBAR, INTRATHECAL CHEMO DELIVERY N/A 12/23/2019    Procedure: Lumbar puncture with IT Chem (CD);  Surgeon: Heather Lopez MD;  [facility]      THORACENTESIS N/A 8/31/2019    Procedure: Thoracentesis;  Surgeon: Michell Keith MD;  [facility]          Anesthesia Evaluation    ROS/Med Hx    No history of anesthetic complications    Cardiovascular Findings - negative ROS    Neuro Findings - negative ROS    Pulmonary Findings   (-) asthma and apnea (indicates he has been told that he snores)    HENT Findings - negative HENT ROS    Skin Findings - negative skin ROS      GI/Hepatic/Renal Findings   (-) GERD    Endocrine/Metabolic Findings - negative ROS      Genetic/Syndrome Findings - negative genetics/syndromes ROS    Hematology/Oncology Findings   (+) cancer  Comments: T lymphoblastic lymphoma            PHYSICAL EXAM:   Mental Status/Neuro: A/A/O   Airway: Facies: Feasible  Mallampati: Not Assessed  Mouth/Opening: Full  TM distance: > 6 cm  Neck ROM: Full   Respiratory: Auscultation: CTAB     Resp. Rate: Normal     Resp. Effort: Normal      CV: Rhythm: Regular  Rate: Age appropriate  Heart: Normal Sounds  Edema: None   Comments:      Dental: Normal  "Dentition                  LABS:  CBC:   Lab Results   Component Value Date    WBC 3.4 (L) 01/13/2020    WBC 1.9 (L) 01/02/2020    HGB 10.4 (L) 01/13/2020    HGB 10.3 (L) 01/02/2020    HCT 32.0 (L) 01/13/2020    HCT 30.8 (L) 01/02/2020     01/13/2020     01/02/2020     BMP:   Lab Results   Component Value Date     01/13/2020     01/02/2020    POTASSIUM 3.4 01/13/2020    POTASSIUM 3.5 01/02/2020    CHLORIDE 112 (H) 01/13/2020    CHLORIDE 112 (H) 01/02/2020    CO2 29 01/13/2020    CO2 27 01/02/2020    BUN 13 01/13/2020    BUN 10 01/02/2020    CR 0.59 (L) 01/13/2020    CR 0.46 (L) 01/02/2020     (H) 01/13/2020     (H) 01/02/2020     COAGS:   Lab Results   Component Value Date    PTT 45 (H) 11/14/2019    INR 1.09 11/14/2019    FIBR 293 11/14/2019     POC:   Lab Results   Component Value Date    BGM 87 11/11/2019     OTHER:   Lab Results   Component Value Date    LACT 0.4 (L) 11/11/2019    MICHAEL 8.6 01/13/2020    PHOS 4.2 11/17/2019    MAG 2.1 11/17/2019    ALBUMIN 3.6 01/13/2020    PROTTOTAL 6.4 (L) 01/13/2020    ALT 67 01/13/2020    AST 17 01/13/2020    ALKPHOS 69 01/13/2020    BILITOTAL 0.2 01/13/2020    LIPASE 1,239 (H) 11/11/2019    AMYLASE 246 (H) 11/11/2019    .0 (H) 11/11/2019        Preop Vitals    BP Readings from Last 3 Encounters:   01/13/20 98/71   01/02/20 116/67   12/23/19 100/78    Pulse Readings from Last 3 Encounters:   01/13/20 74   01/02/20 62   12/23/19 61      Resp Readings from Last 3 Encounters:   01/13/20 20   01/02/20 16   12/23/19 16    SpO2 Readings from Last 3 Encounters:   01/13/20 100%   01/02/20 99%   12/23/19 97%      Temp Readings from Last 1 Encounters:   01/13/20 36.8  C (98.3  F) (Oral)    Ht Readings from Last 1 Encounters:   01/13/20 1.845 m (6' 0.64\")      Wt Readings from Last 1 Encounters:   01/13/20 82.9 kg (182 lb 12.2 oz)    Estimated body mass index is 24.35 kg/m  as calculated from the following:    Height as of 1/13/20: 1.845 m " "(6' 0.64\").    Weight as of 1/13/20: 82.9 kg (182 lb 12.2 oz).     LDA:  Port A Cath Single 10/24/19 Right Chest wall (Active)   Number of days: 90        Assessment:   ASA SCORE: 3            Plan:   Anes. Type:  General   Pre-Medication: None   Induction:  IV (Standard)   Airway: Native Airway   Access/Monitoring: Central Access/Port present   Maintenance: Propofol Sedation     Postop Plan:   Postop Pain: None  Postop Sedation/Airway: Not planned  Disposition: Outpatient     PONV Management:    Prevention: Ondansetron, Propofol     CONSENT: Direct conversation   Plan and risks discussed with: Patient   Blood Products: Consent Deferred (Minimal Blood Loss)           Toña Alicea MD  "

## 2020-01-23 NOTE — DISCHARGE INSTRUCTIONS
[facility]   [phone]    Care post Lumbar Puncture     Do not remove bandage/dressing for 24 hours -- after this time they can be removed    No bath, shower or soaking of the dressing for 24 hours    Activity as tolerated by the patient    Diet as able to tolerated    May use Tylenol as needed for pain control -- DO NOT use Ibuprofen    Can apply icepack to the site for discomfort -- no more than 10 minutes at a time    If bleeding presents apply pressure for 5 minutes    Call [phone] ask for Peds BMT/Hem/Onc fellow on call if complications arise including:    persistent bleeding    fever greater than 100.5    Pain    Lumbar punctures can cause headache. If the pain is not controlled with Tylenol (acetaminophen) please call the Peds BMT/Hem/Onc fellow on call    .u  * Recovery After Conscious Sedation (Adult)  We gave you medicine by vein to make you sleepy or relaxed during your procedure. This may have included both a pain medicine and sleeping medicine. Most of the effects have worn off. But you may still feel sleepy for the next 6 to 8 hours.  Home care  Follow these guidelines when you get home:    You may feel sleepy and clumsy and have poor balance for the next few hours.    A responsible adult should stay with you for the next 8 hours. This person should make sure your condition doesn t get worse.    Don't drink any alcohol for the next 24 hours.    Don't drive, operate dangerous machinery, make important business or personal decisions or sign legal documents during the next 24 hours.    You may vomit (throw up) if you eat too soon after the procedure. If this happens, drink small amounts of water, juice or clear broth. Wait to try solid food until you no longer have nausea (upset stomach).  Note: Your care team may tell you not to take any medicine by mouth for pain or sleep in the next 4 hours. These medicines may react with the medicines you had in the hospital. This could cause a much  stronger response than usual.  Follow-up care  Follow up with your care team if you are not alert and back to your usual level of activity within 12 hours.  When to seek medical advice  Call your care team right away if any of these occur:    You still feel sleepy or clumsy after 12 hours, or your sleepiness gets worse    Weakness or dizziness gets worse    Repeated vomiting    If you can't be woken up and someone is staying with you, they should call 911.  Date Last Reviewed: 10/18/2016    5969-2205 [employer]. [address] [place], [place] [address]. All rights reserved. This information is not intended as a substitute for professional medical care. Always follow your healthcare professional's instructions.  This information has been modified by your health care provider with permission from the publisher.

## 2020-01-23 NOTE — OR NURSING
Pt alert, VSS,  No c/o discomfort when asked.  Drsg to LP site is dry and intact. Port hep locked. Discharge instructions reviewed with pt. Pt eating and drinking well. Pt discharged with grandmother to [facility] .

## 2020-01-23 NOTE — PROCEDURES
A Lumbar Puncture was performed in the Pediatric Sedation Suite. Informed consent was obtained prior to the procedure. Lazaro Lund was identified by facial recognition and ID arm band. A time-out was performed. Lazaro Lund was then placed in the left lateral decubitus position and the lumbosacral area was sterily prepped using Chloraprep followed by drape placement. Anatomic landmarks were identified by palpation. Then, a 22 gauge, 2.5 inch spinal needle was easily inserted into the L4-L5 interspace. On the first attempt approximately 3 mL of clear and colorless cerebrospinal fluid was obtained to be sent to the lab for cell count analysis and cytospin. Following that, 15mg of intrathecal Methotrexate in 6 mL of preservative-free normal saline was infused without resistance. The needle was removed and a Band-Aid applied. Lazaro Lund tolerated this procedure very well.    Signed,  Nicho Fischer   Pediatric Hematology/Oncology Fellow

## 2020-01-23 NOTE — LETTER
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 He is currently day  31 of Interim Maintenance.    With induction, he had a CRu, resolution of lymphadenopathy, but persistence of small pleural effusion (resolved after consolidation). Induction was complicated by development of extensive upper DVT complicated by edema and folliculitis preventing day 29 LP, and consolidation was complicated by severe PEG-asparaginase induced necrotizing pancreatitis, being monitored by Dr. Montgomery. As a result, PEG-asparaginase has been discontinued from his treatment regimen.    Plan  1. Labs today are good to continue capizzi methotrexate dose escalation. Will also receive vincristine and IT chemo  1. Reviewed anticipatory nausea management, constipation management, and importance of hydration  2. Abdomen CT today - will follow up GI recommendations  3. Continue lovenox at current dose. Check Xa level monthly (due mid February). He is aware of the need to hold PM prior and AM of his LPs. Will maintain platelet count > 30K while on anticoagulation.   1. Will obtain follow up upper extremity US at end of February   4. Continue vitamin D, repeat level in early March  5. Day 29 induction LP that was deferred will be made up during maintenance therapy  1. Given significant spinal headache history will plan for IV caffeine following LPs in the future.   6. Given recent pancreatitis and intermediate TPMT phenotype his 6MP dose was reduced for the 2nd half of Consolidation by 50%. TPMT and NUDT15 genotype suggestive of no reduced function.   7. RTC on 2/4/20 for day 41 chemotherapy and cardiology appointment. Will get ECHO on this day. DI to start day 57 or after count  recovery    Signed,  Nicho Fischer   Pediatric Hematology/Oncology Fellow    Physician Attestation    I saw and evaluated the patient. I discussed with the fellow and agree with the findings and plan as documented in the fellow's note.     Reynaldo Campuzano MD  Pediatric Hematology/Oncology  [facility]      Primary Care Physician   Rodriguez Montoya    Interim History  Lazaro presents today for day 31 of interim maintenance     After his last chemotherapy, Lazaro experienced 2-3 days of nausea but similar to the prior time, and it was well managed with zofran. He has been able to drink a normal amount. He denied experiencing abdominal pain. He has not had issues with constipation either. His energy is overall good, and he has a good appetite. He is taking his lovenox regularly and omitted last night's dose in anticipation of his LP. He is sleeping well and has not had fever. He does not need any refills today.        Oncology History  Lazaro Lund is a 19 year old young man with T-cell lymphoblastic lymphoma, pratt stage III. Lazaro presented with chronic cough with progressive orthopnea and was found to have an anterior mediastinal mass and malignant left-sided pleural effusion.  A CT guided biopsy was obtained at [facility] and pathology was consistent with T-cell neoplasm.  He was admitted to [facility] oncology service 8/30 and started on treatment per [id number].  He had a pleural effusion that required a chest tube and a pericardial effusion that was not drained. His Induction was complicated by cardiac compression secondary to his mass leading to tamponade, this improved through out his hospital stay. His induction course was complicated by extensive bilateral UE DVTs for which anticoagulation was started. At end of induction, he had a CRu response (persistent effusion on imaging). Consolidation was complicated by the development of severe asparaginase induced pancreatitis.  Due to this, asparaginase was omitted from his treatment plan. His end of consolidation scan showed resolved pleural effusion and no evidence of disease.     Past Medical History    I have reviewed this patient's medical history and updated it with pertinent information if needed.   Past Medical History:   Diagnosis Date     Acute necrotizing pancreatitis 11/07/2019    attributed to asparaginase     Acute pancreatitis due to PEGaspariginase therapy  11/15/2019     DVT of upper extremity (deep vein thrombosis) (H) 09/26/2019    Bilateral      Edema of upper extremity 10/17/2019     Folliculitis 10/17/2019     Migraine 2006    have resolved     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       Past Surgical History   I have reviewed this patient's surgical history and updated it with pertinent information if needed.  Past Surgical History:   Procedure Laterality Date     BONE MARROW BIOPSY, BONE SPECIMEN, NEEDLE/TROCAR Left 9/1/2019    Procedure: BIOPSY, BONE MARROW;  Surgeon: Heather Lopez MD;  Location: [facility] OR     INSERT PICC LINE N/A 8/31/2019    Procedure: INSERTION, PICC;  Surgeon: Michell Keith MD;  [facility] OR     INSERT PORT VASCULAR ACCESS N/A 10/24/2019    Procedure: INSERTION, VASCULAR ACCESS PORT;  Surgeon: Silviano Martins MD;  Location: [facility] PEDS SEDATION      IR CHEST PORT PLACEMENT > 5 YRS OF AGE  10/24/2019     IR CHEST TUBE PLACEMENT NON-TUNNELLED LEFT  8/31/2019     IR PICC PLACEMENT > 5 YRS OF AGE  8/31/2019     IR PORT CHECK RIGHT  11/12/2019     SPINAL PUNCTURE,LUMBAR, INTRATHECAL CHEMO DELIVERY N/A 8/31/2019    Procedure: LUMBAR PUNCTURE, WITH INTRATHECAL CHEMOTHERAPY ADMINISTRATION;  Surgeon: Heather Lopez MD;  [facility] OR     SPINAL PUNCTURE,LUMBAR, INTRATHECAL CHEMO DELIVERY N/A 9/9/2019    Procedure: Lumbar Puncture With Intrathecal Chemo;  Surgeon: Alexi Hayes MD;  [facility]     SPINAL PUNCTURE,LUMBAR, INTRATHECAL CHEMO DELIVERY N/A 10/10/2019     Procedure: Lumbar puncture with IT Chemo (CD);  Surgeon: Reynaldo Campuzano MD;  [facility] PED[facility] SED[facility]      SPINAL PUNCTURE,LUMBAR, INTRATHECAL CHEMO DELIVERY N/A 10/17/2019    Procedure: Lumbar puncture with IT Chemo (not CD);  Surgeon: Reynaldo Campuzano MD;  [facility]      SPINAL PUNCTURE,LUMBAR, INTRATHECAL CHEMO DELIVERY N/A 10/24/2019    Procedure: LUMBAR PUNCTURE, WITH INTRATHECAL CHEMOTHERAPY ADMINISTRATION;  Surgeon: Félix Van APRN CNP;  [facility]      SPINAL PUNCTURE,LUMBAR, INTRATHECAL CHEMO DELIVERY N/A 10/31/2019    Procedure: Lumbar puncture with IT Chemo (not CD);  Surgeon: Reynaldo Campuzano MD;  [facility]      SPINAL PUNCTURE,LUMBAR, INTRATHECAL CHEMO DELIVERY N/A 12/19/2019    Procedure: Lumbar puncture with IT Chem (CD);  Surgeon: Félix Van APRN CNP;  [facility]      SPINAL PUNCTURE,LUMBAR, INTRATHECAL CHEMO DELIVERY N/A 12/23/2019    Procedure: Lumbar puncture with IT Chem (CD);  Surgeon: Heather Lopez MD;  [facility] P[facility]      THORACENTESIS N/A 8/31/2019    Procedure: Thoracentesis;  Surgeon: Michell Keith MD;  [facility] OR       Medications   Current Outpatient Medications on File Prior to Visit   Medication Sig Dispense Refill     diphenhydrAMINE (BENADRYL) 25 MG capsule Take 1-2 capsules (25-50 mg) by mouth every 6 hours as needed (Breakthrough Nausea and Vomiting ) 30 capsule 1     enoxaparin ANTICOAGULANT (LOVENOX) 100 MG/ML syringe Inject 1 mL (100 mg) Subcutaneous every 12 hours 180 mL 0     ondansetron (ZOFRAN-ODT) 8 MG ODT tab Take 1 tablet (8 mg) by mouth every 6 hours as needed for nausea 20 tablet 0     pantoprazole (PROTONIX) 40 MG EC tablet Take 1 tablet (40 mg) by mouth every morning (before breakfast) 30 tablet 0     polyethylene glycol (MIRALAX/GLYCOLAX) packet Take 17 g by mouth daily 30 packet 0     scopolamine (TRANSDERM) 1 MG/3DAYS 72 hr patch  Place 1 patch onto the skin every 72 hours 10 patch 3     sennosides (SENOKOT) 8.6 MG tablet Take 2 tablets by mouth 2 times daily as needed for constipation 60 each 1     Vitamin D, Cholecalciferol, 25 MCG (1000 UT) TABS Take 2 tablets (2,000 Units) by mouth daily 60 tablet 3     Allergies   Allergies   Allergen Reactions     Asparaginase Derivatives Other (See Comments)     Severe pancreatitis     No Known Drug Allergies        Social History   I have updated and reviewed the following Social History Narrative:   Pediatric History   Patient Parents     JHONATHAN RODRIGUEZ (Mother)     AVNI RODRIGUEZ (Father)     Other Topics Concern     Not on file   Social History Narrative    Lives at home in [place] with Dad and Step-Mom. Biological mother is involved in his life and accompanied him during this hospitalization. Has 4 step-siblings and 1 biological sibling. 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 Has completed high school.       Family History   I have reviewed this patient's family history and updated it with pertinent information if needed.   Family History   Problem Relation Age of Onset     No Known Problems Mother      No Known Problems Father      Asthma Brother      Thyroid Cancer Paternal Grandmother      Melanoma Paternal Aunt        Review of Systems   The 10 point Review of Systems is negative other than noted in the HPI or here.     Physical Exam   Vital Signs with Ranges  Temp:  [97.3  F (36.3  C)-98  F (36.7  C)] 98  F (36.7  C)  Pulse:  [67-84] 78  Heart Rate:  [68-91] 71  Resp:  [12-21] 18  BP: ()/(44-87) 130/87  SpO2:  [97 %-100 %] 98 %    General: Lazaro is alert, interactive and appropriate. He appears well, is upbeat and in no obvious pain.  HEENT: Skull is atrauamatic and normocephalic.  Full head of hair. PERRLA, sclera are non icteric and not injected, EOM are intact. Nares are  patent without drainage. Oropharynx clear, no plaques noted. Buccal mucosa and tongue clear. MMM.   Lymph:  Neck is supple without lymphadenopathy.  There is no supraclavicular, axillary or inguinal lymphadenopathy palpated.  Cardiovascular:  HR is normal with regular rhythm, no murmur.  Capillary refill is < 2 seconds. Right arm without edema or erythema.  No pitting edema.   Respiratory: No cough noted. Respirations are easy.  Lungs are clear to auscultation through out.  No crackles or wheezes.  Gastrointestinal:  BS present in all quadrants.  Abdomen is soft and flat. No tenderness with palpation. No hepatosplenomegaly or mass.  Skin: No rashes, bruises or other skin lesions noted.  Neurological:  No focal deficits  Musculoskeletal:  Good strength and ROM in all extremities.  Strong dorsiflexion at ankles and great toes (5/5) bilaterally without any pain at the Achilles.    Data   Results for orders placed or performed during the hospital encounter of 01/23/20 (from the past 24 hour(s))   CBC with platelets differential   Result Value Ref Range    WBC 4.2 4.0 - 11.0 10e9/L    RBC Count 3.76 (L) 4.4 - 5.9 10e12/L    Hemoglobin 12.0 (L) 13.3 - 17.7 g/dL    Hematocrit 36.1 (L) 40.0 - 53.0 %    MCV 96 78 - 100 fl    MCH 31.9 26.5 - 33.0 pg    MCHC 33.2 31.5 - 36.5 g/dL    RDW 15.0 10.0 - 15.0 %    Platelet Count 310 150 - 450 10e9/L    Diff Method Automated Method     % Neutrophils 52.9 %    % Lymphocytes 25.7 %    % Monocytes 12.0 %    % Eosinophils 7.7 %    % Basophils 0.7 %    % Immature Granulocytes 1.0 %    Nucleated RBCs 0 0 /100    Absolute Neutrophil 2.2 1.6 - 8.3 10e9/L    Absolute Lymphocytes 1.1 0.8 - 5.3 10e9/L    Absolute Monocytes 0.5 0.0 - 1.3 10e9/L    Absolute Eosinophils 0.3 0.0 - 0.7 10e9/L    Absolute Basophils 0.0 0.0 - 0.2 10e9/L    Abs Immature Granulocytes 0.0 0 - 0.4 10e9/L    Absolute Nucleated RBC 0.0    Comprehensive metabolic panel   Result Value Ref Range    Sodium 140 133 - 144 mmol/L     Potassium 4.1 3.4 - 5.3 mmol/L    Chloride 108 94 - 109 mmol/L    Carbon Dioxide 29 20 - 32 mmol/L    Anion Gap 3 3 - 14 mmol/L    Glucose 103 (H) 70 - 99 mg/dL    Urea Nitrogen 8 7 - 30 mg/dL    Creatinine 0.73 0.66 - 1.25 mg/dL    GFR Estimate >90 >60 mL/min/[1.73_m2]    GFR Estimate If Black >90 >60 mL/min/[1.73_m2]    Calcium 9.1 8.5 - 10.1 mg/dL    Bilirubin Total 0.2 0.2 - 1.3 mg/dL    Albumin 4.2 3.4 - 5.0 g/dL    Protein Total 7.2 6.8 - 8.8 g/dL    Alkaline Phosphatase 74 40 - 150 U/L    ALT 51 0 - 70 U/L    AST 19 0 - 45 U/L   Cell count with differential CSF:   Result Value Ref Range    WBC CSF 0 0 - 5 /uL    RBC CSF 1 0 - 2 /uL    Tube Number 1 #    Color CSF Colorless CLRL^Colorless    Appearance CSF Clear CLER^Clear     Procedures:  Pediatric Hem/Onc Lumbar Puncture Note  Lumbar puncture to obtain CSF and administration of intrathecal chemotherapy  Procedure, benefits, risks, and alternatives explained to the patient who voiced understanding of the information and agreed to proceed with lumbar puncture.  Risks include bleeding and or infection. Area prepped and draped in a sterile fashion. 22 gauge, length: 3.5 cm needle placed between third and fourth vertebrate and 3 mL spinal fluid collected. Afterward syringe with 15 mg methotrexate was applied to the needle and administered intrathecally. Specimen appears clear and colorless. Specimen sent for cell count, differential and cytology. No complications including bleeding. He tolerated the procedure well. Patient will remain on back for 1 hour post procedure.    Physician Attestation   I personally observed as the fellow performed the lumbar puncture procedure and administered the appropriate dose of intrathecal methotrexate.    Reynaldo Campuzano  Date of Service (when I saw the patient): 01/23/20        Nicho Fischer MD

## 2020-01-23 NOTE — PROGRESS NOTES
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 He is currently day 31 of Interim Maintenance.    With induction, he had a CRu, resolution of lymphadenopathy, but persistence of small pleural effusion (resolved after consolidation). Induction was complicated by development of extensive upper DVT complicated by edema and folliculitis preventing day 29 LP, and consolidation was complicated by severe PEG-asparaginase induced necrotizing pancreatitis, being monitored by Dr. Montgomery. As a result, PEG-asparaginase has been discontinued from his treatment regimen.    Plan  1. Labs today are good to continue capizzi methotrexate dose escalation. Will also receive vincristine and IT chemo  1. Reviewed anticipatory nausea management, constipation management, and importance of hydration  2. Abdomen CT today - will follow up GI recommendations  3. Continue lovenox at current dose. Check Xa level monthly (due mid February). He is aware of the need to hold PM prior and AM of his LPs. Will maintain platelet count > 30K while on anticoagulation.   1. Will obtain follow up upper extremity US at end of February   4. Continue vitamin D, repeat level in early March  5. Day 29 induction LP that was deferred will be made up during maintenance therapy  1. Given significant spinal headache history will plan for IV caffeine following LPs in the future.   6. Given recent pancreatitis and intermediate TPMT phenotype his 6MP dose was reduced for the 2nd half of Consolidation by 50%. TPMT and NUDT15 genotype suggestive of no reduced function.   7. RTC on 2/4/20 for day 41 chemotherapy and cardiology appointment. Will get ECHO on this day. DI to start day 57 or after count recovery    Signed,  Nicho Fischer   Pediatric Hematology/Oncology Fellow    Physician  Attestation    I saw and evaluated the patient. I discussed with the fellow and agree with the findings and plan as documented in the fellow's note.     Reynaldo Campuzano MD  Pediatric Hematology/Oncology  [facility]      Primary Care Physician   Rodriguez Montoya    Interim History  Lazaro presents today for day 31 of interim maintenance     After his last chemotherapy, Lazaro experienced 2-3 days of nausea but similar to the prior time, and it was well managed with zofran. He has been able to drink a normal amount. He denied experiencing abdominal pain. He has not had issues with constipation either. His energy is overall good, and he has a good appetite. He is taking his lovenox regularly and omitted last night's dose in anticipation of his LP. He is sleeping well and has not had fever. He does not need any refills today.        Oncology History  Lazaro Lund is a 19 year old young man with T-cell lymphoblastic lymphoma, pratt stage III. Lazaro presented with chronic cough with progressive orthopnea and was found to have an anterior mediastinal mass and malignant left-sided pleural effusion.  A CT guided biopsy was obtained at [facility] and pathology was consistent with T-cell neoplasm.  He was admitted to [facility] oncology service 8/30 and started on treatment per [id number].  He had a pleural effusion that required a chest tube and a pericardial effusion that was not drained. His Induction was complicated by cardiac compression secondary to his mass leading to tamponade, this improved through out his hospital stay. His induction course was complicated by extensive bilateral UE DVTs for which anticoagulation was started. At end of induction, he had a CRu response (persistent effusion on imaging). Consolidation was complicated by the development of severe asparaginase induced pancreatitis. Due to this, asparaginase was omitted from his treatment plan. His end of consolidation  scan showed resolved pleural effusion and no evidence of disease.     Past Medical History    I have reviewed this patient's medical history and updated it with pertinent information if needed.   Past Medical History:   Diagnosis Date     Acute necrotizing pancreatitis 11/07/2019    attributed to asparaginase     Acute pancreatitis due to PEGaspariginase therapy  11/15/2019     DVT of upper extremity (deep vein thrombosis) (H) 09/26/2019    Bilateral      Edema of upper extremity 10/17/2019     Folliculitis 10/17/2019     Migraine 2006    have resolved     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       Past Surgical History   I have reviewed this patient's surgical history and updated it with pertinent information if needed.  Past Surgical History:   Procedure Laterality Date     BONE MARROW BIOPSY, BONE SPECIMEN, NEEDLE/TROCAR Left 9/1/2019    Procedure: BIOPSY, BONE MARROW;  Surgeon: Heather Lopez MD;  Location: [facility] OR     INSERT PICC LINE N/A 8/31/2019    Procedure: INSERTION, PICC;  Surgeon: Michell Keith MD;  [facility]: [facility] OR     INSERT PORT VASCULAR ACCESS N/A 10/24/2019    Procedure: INSERTION, VASCULAR ACCESS PORT;  Surgeon: Silviano Martins MD;  [facility]: [facility] PEDS SEDATION      IR CHEST PORT PLACEMENT > 5 YRS OF AGE  10/24/2019     IR CHEST TUBE PLACEMENT NON-TUNNELLED LEFT  8/31/2019     IR PICC PLACEMENT > 5 YRS OF AGE  8/31/2019     IR PORT CHECK RIGHT  11/12/2019     SPINAL PUNCTURE,LUMBAR, INTRATHECAL CHEMO DELIVERY N/A 8/31/2019    Procedure: LUMBAR PUNCTURE, WITH INTRATHECAL CHEMOTHERAPY ADMINISTRATION;  Surgeon: Heather Lopez MD;  [facility] OR     SPINAL PUNCTURE,LUMBAR, INTRATHECAL CHEMO DELIVERY N/A 9/9/2019    Procedure: Lumbar Puncture With Intrathecal Chemo;  Surgeon: Alexi Hayes MD;  [facility]     SPINAL PUNCTURE,LUMBAR, INTRATHECAL CHEMO DELIVERY N/A 10/10/2019    Procedure: Lumbar puncture with IT Chemo (CD);  Surgeon: Reynaldo Campuzano MD;   [facility]      SPINAL PUNCTURE,LUMBAR, INTRATHECAL CHEMO DELIVERY N/A 10/17/2019    Procedure: Lumbar puncture with IT Chemo (not CD);  Surgeon: Reynaldo Campuzano MD;  [facility]      SPINAL PUNCTURE,LUMBAR, INTRATHECAL CHEMO DELIVERY N/A 10/24/2019    Procedure: LUMBAR PUNCTURE, WITH INTRATHECAL CHEMOTHERAPY ADMINISTRATION;  Surgeon: Félix Van APRN CNP;  [facility]      SPINAL PUNCTURE,LUMBAR, INTRATHECAL CHEMO DELIVERY N/A 10/31/2019    Procedure: Lumbar puncture with IT Chemo (not CD);  Surgeon: Reynaldo Campuzano MD;  [facility]      SPINAL PUNCTURE,LUMBAR, INTRATHECAL CHEMO DELIVERY N/A 12/19/2019    Procedure: Lumbar puncture with IT Chem (CD);  Surgeon: Félix Van APRN CNP;  [facility]      SPINAL PUNCTURE,LUMBAR, INTRATHECAL CHEMO DELIVERY N/A 12/23/2019    Procedure: Lumbar puncture with IT Chem (CD);  Surgeon: Heather Lopez MD;  [facility]      THORACENTESIS N/A 8/31/2019    Procedure: Thoracentesis;  Surgeon: Michell Keith MD;  [facility] OR       Medications   Current Outpatient Medications on File Prior to Visit   Medication Sig Dispense Refill     diphenhydrAMINE (BENADRYL) 25 MG capsule Take 1-2 capsules (25-50 mg) by mouth every 6 hours as needed (Breakthrough Nausea and Vomiting ) 30 capsule 1     enoxaparin ANTICOAGULANT (LOVENOX) 100 MG/ML syringe Inject 1 mL (100 mg) Subcutaneous every 12 hours 180 mL 0     ondansetron (ZOFRAN-ODT) 8 MG ODT tab Take 1 tablet (8 mg) by mouth every 6 hours as needed for nausea 20 tablet 0     pantoprazole (PROTONIX) 40 MG EC tablet Take 1 tablet (40 mg) by mouth every morning (before breakfast) 30 tablet 0     polyethylene glycol (MIRALAX/GLYCOLAX) packet Take 17 g by mouth daily 30 packet 0     scopolamine (TRANSDERM) 1 MG/3DAYS 72 hr patch Place 1 patch onto the skin every 72 hours 10 patch 3     sennosides (SENOKOT) 8.6 MG  tablet Take 2 tablets by mouth 2 times daily as needed for constipation 60 each 1     Vitamin D, Cholecalciferol, 25 MCG (1000 UT) TABS Take 2 tablets (2,000 Units) by mouth daily 60 tablet 3     Allergies   Allergies   Allergen Reactions     Asparaginase Derivatives Other (See Comments)     Severe pancreatitis     No Known Drug Allergies        Social History   I have updated and reviewed the following Social History Narrative:   Pediatric History   Patient Parents     JHONATHAN RODRIGUEZ (Mother)     AVNI RODRIGUEZ (Father)     Other Topics Concern     Not on file   Social History Narrative    Lives at home in [place] with Dad and Step-Mom. Biological mother is involved in his life and accompanied him during this hospitalization. Has 4 step-siblings and 1 biological sibling. 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 Has completed high school.       Family History   I have reviewed this patient's family history and updated it with pertinent information if needed.   Family History   Problem Relation Age of Onset     No Known Problems Mother      No Known Problems Father      Asthma Brother      Thyroid Cancer Paternal Grandmother      Melanoma Paternal Aunt        Review of Systems   The 10 point Review of Systems is negative other than noted in the HPI or here.     Physical Exam   Vital Signs with Ranges  Temp:  [97.3  F (36.3  C)-98  F (36.7  C)] 98  F (36.7  C)  Pulse:  [67-84] 78  Heart Rate:  [68-91] 71  Resp:  [12-21] 18  BP: ()/(44-87) 130/87  SpO2:  [97 %-100 %] 98 %    General: Lazaro is alert, interactive and appropriate. He appears well, is upbeat and in no obvious pain.  HEENT: Skull is atrauamatic and normocephalic.  Full head of hair. PERRLA, sclera are non icteric and not injected, EOM are intact. Nares are patent without drainage. Oropharynx clear, no plaques noted. Buccal mucosa and tongue  clear. MMM.   Lymph:  Neck is supple without lymphadenopathy.  There is no supraclavicular, axillary or inguinal lymphadenopathy palpated.  Cardiovascular:  HR is normal with regular rhythm, no murmur.  Capillary refill is < 2 seconds. Right arm without edema or erythema.  No pitting edema.   Respiratory: No cough noted. Respirations are easy.  Lungs are clear to auscultation through out.  No crackles or wheezes.  Gastrointestinal:  BS present in all quadrants.  Abdomen is soft and flat. No tenderness with palpation. No hepatosplenomegaly or mass.  Skin: No rashes, bruises or other skin lesions noted.  Neurological:  No focal deficits  Musculoskeletal:  Good strength and ROM in all extremities.  Strong dorsiflexion at ankles and great toes (5/5) bilaterally without any pain at the Achilles.    Data   Results for orders placed or performed during the hospital encounter of 01/23/20 (from the past 24 hour(s))   CBC with platelets differential   Result Value Ref Range    WBC 4.2 4.0 - 11.0 10e9/L    RBC Count 3.76 (L) 4.4 - 5.9 10e12/L    Hemoglobin 12.0 (L) 13.3 - 17.7 g/dL    Hematocrit 36.1 (L) 40.0 - 53.0 %    MCV 96 78 - 100 fl    MCH 31.9 26.5 - 33.0 pg    MCHC 33.2 31.5 - 36.5 g/dL    RDW 15.0 10.0 - 15.0 %    Platelet Count 310 150 - 450 10e9/L    Diff Method Automated Method     % Neutrophils 52.9 %    % Lymphocytes 25.7 %    % Monocytes 12.0 %    % Eosinophils 7.7 %    % Basophils 0.7 %    % Immature Granulocytes 1.0 %    Nucleated RBCs 0 0 /100    Absolute Neutrophil 2.2 1.6 - 8.3 10e9/L    Absolute Lymphocytes 1.1 0.8 - 5.3 10e9/L    Absolute Monocytes 0.5 0.0 - 1.3 10e9/L    Absolute Eosinophils 0.3 0.0 - 0.7 10e9/L    Absolute Basophils 0.0 0.0 - 0.2 10e9/L    Abs Immature Granulocytes 0.0 0 - 0.4 10e9/L    Absolute Nucleated RBC 0.0    Comprehensive metabolic panel   Result Value Ref Range    Sodium 140 133 - 144 mmol/L    Potassium 4.1 3.4 - 5.3 mmol/L    Chloride 108 94 - 109 mmol/L    Carbon Dioxide 29  20 - 32 mmol/L    Anion Gap 3 3 - 14 mmol/L    Glucose 103 (H) 70 - 99 mg/dL    Urea Nitrogen 8 7 - 30 mg/dL    Creatinine 0.73 0.66 - 1.25 mg/dL    GFR Estimate >90 >60 mL/min/[1.73_m2]    GFR Estimate If Black >90 >60 mL/min/[1.73_m2]    Calcium 9.1 8.5 - 10.1 mg/dL    Bilirubin Total 0.2 0.2 - 1.3 mg/dL    Albumin 4.2 3.4 - 5.0 g/dL    Protein Total 7.2 6.8 - 8.8 g/dL    Alkaline Phosphatase 74 40 - 150 U/L    ALT 51 0 - 70 U/L    AST 19 0 - 45 U/L   Cell count with differential CSF:   Result Value Ref Range    WBC CSF 0 0 - 5 /uL    RBC CSF 1 0 - 2 /uL    Tube Number 1 #    Color CSF Colorless CLRL^Colorless    Appearance CSF Clear CLER^Clear     Procedures:  Pediatric Hem/Onc Lumbar Puncture Note  Lumbar puncture to obtain CSF and administration of intrathecal chemotherapy  Procedure, benefits, risks, and alternatives explained to the patient who voiced understanding of the information and agreed to proceed with lumbar puncture.  Risks include bleeding and or infection. Area prepped and draped in a sterile fashion. 22 gauge, length: 3.5 cm needle placed between third and fourth vertebrate and 3 mL spinal fluid collected. Afterward syringe with 15 mg methotrexate was applied to the needle and administered intrathecally. Specimen appears clear and colorless. Specimen sent for cell count, differential and cytology. No complications including bleeding. He tolerated the procedure well. Patient will remain on back for 1 hour post procedure.    Physician Attestation   I personally observed as the fellow performed the lumbar puncture procedure and administered the appropriate dose of intrathecal methotrexate.    Reynaldo Campuzano  Date of Service (when I saw the patient): 01/23/20

## 2020-01-23 NOTE — ANESTHESIA CARE TRANSFER NOTE
Patient: Lazaro Lund    Procedure(s):  Lumbar puncture with IT Chem (CD)    Diagnosis: lymphoma  Diagnosis Additional Information: No value filed.    Anesthesia Type:   General     Note:  Airway :Nasal Cannula  Patient transferred to: Recovery  Handoff Report: Identifed the Patient, Identified the Reponsible Provider, Reviewed the pertinent medical history, Discussed the surgical course, Reviewed Intra-OP anesthesia mangement and issues during anesthesia, Set expectations for post-procedure period and Allowed opportunity for questions and acknowledgement of understanding      Vitals: (Last set prior to Anesthesia Care Transfer)    CRNA VITALS  1/23/2020 1200 - 1/23/2020 1236      1/23/2020             Temp:  36.4  C (97.5  F)                Electronically Signed By: YOHAN Ramos CRNA  January 23, 2020  12:36 PM

## 2020-01-25 LAB
APPEARANCE CSF: CLEAR
COLOR CSF: COLORLESS
RBC # CSF MANUAL: 1 /UL (ref 0–2)
TUBE # CSF: 1 #
WBC # CSF MANUAL: 0 /UL (ref 0–5)

## 2020-01-31 ENCOUNTER — TELEPHONE (OUTPATIENT)
Dept: ONCOLOGY | Facility: CLINIC | Age: 20
End: 2020-01-31

## 2020-01-31 NOTE — TELEPHONE ENCOUNTER
Tobacco Treatment Program at the [facility] attempted to reach Mr. Lund on 1/31/2020 regarding the tobacco cessation program to help Mr. Lund to quit smoking. We will attempt to reach Mr. Lund another time.     [facility]  Tobacco Treatment Specialist  PH: [phone]

## 2020-02-04 ENCOUNTER — HOSPITAL ENCOUNTER (OUTPATIENT)
Dept: CARDIOLOGY | Facility: CLINIC | Age: 20
Discharge: HOME | End: 2020-02-04
Attending: NURSE PRACTITIONER | Admitting: NURSE PRACTITIONER
Payer: MEDICAID

## 2020-02-04 ENCOUNTER — OFFICE VISIT (OUTPATIENT)
Dept: PEDIATRIC HEMATOLOGY/ONCOLOGY | Facility: CLINIC | Age: 20
End: 2020-02-04
Attending: NURSE PRACTITIONER
Payer: MEDICAID

## 2020-02-04 ENCOUNTER — INFUSION THERAPY VISIT (OUTPATIENT)
Dept: INFUSION THERAPY | Facility: CLINIC | Age: 20
End: 2020-02-04
Attending: NURSE PRACTITIONER
Payer: MEDICAID

## 2020-02-04 VITALS
RESPIRATION RATE: 16 BRPM | HEART RATE: 91 BPM | OXYGEN SATURATION: 98 % | SYSTOLIC BLOOD PRESSURE: 126 MMHG | BODY MASS INDEX: 24.98 KG/M2 | DIASTOLIC BLOOD PRESSURE: 77 MMHG | TEMPERATURE: 98 F | WEIGHT: 188.49 LBS | HEIGHT: 73 IN

## 2020-02-04 DIAGNOSIS — K85.31 DRUG-INDUCED ACUTE PANCREATITIS WITH UNINFECTED NECROSIS: ICD-10-CM

## 2020-02-04 DIAGNOSIS — C83.50 T LYMPHOBLASTIC LYMPHOMA (H): ICD-10-CM

## 2020-02-04 DIAGNOSIS — C95.00 ACUTE LEUKEMIA NOT HAVING ACHIEVED REMISSION (H): ICD-10-CM

## 2020-02-04 DIAGNOSIS — C83.50 T LYMPHOBLASTIC LYMPHOMA (H): Primary | ICD-10-CM

## 2020-02-04 LAB
ALBUMIN SERPL-MCNC: 3.8 G/DL (ref 3.4–5)
ALP SERPL-CCNC: 73 U/L (ref 40–150)
ALT SERPL W P-5'-P-CCNC: 136 U/L (ref 0–70)
ANION GAP SERPL CALCULATED.3IONS-SCNC: 7 MMOL/L (ref 3–14)
AST SERPL W P-5'-P-CCNC: 23 U/L (ref 0–45)
BASOPHILS # BLD AUTO: 0 10E9/L (ref 0–0.2)
BASOPHILS NFR BLD AUTO: 0.3 %
BILIRUB SERPL-MCNC: 0.2 MG/DL (ref 0.2–1.3)
BUN SERPL-MCNC: 12 MG/DL (ref 7–30)
CALCIUM SERPL-MCNC: 9 MG/DL (ref 8.5–10.1)
CHLORIDE SERPL-SCNC: 109 MMOL/L (ref 94–109)
CO2 SERPL-SCNC: 26 MMOL/L (ref 20–32)
CREAT SERPL-MCNC: 0.67 MG/DL (ref 0.66–1.25)
DIFFERENTIAL METHOD BLD: ABNORMAL
EOSINOPHIL # BLD AUTO: 0.1 10E9/L (ref 0–0.7)
EOSINOPHIL NFR BLD AUTO: 4.1 %
ERYTHROCYTE [DISTWIDTH] IN BLOOD BY AUTOMATED COUNT: 13.8 % (ref 10–15)
GFR SERPL CREATININE-BSD FRML MDRD: >90 ML/MIN/{1.73_M2}
GLUCOSE SERPL-MCNC: 97 MG/DL (ref 70–99)
HCT VFR BLD AUTO: 35.2 % (ref 40–53)
HGB BLD-MCNC: 11.7 G/DL (ref 13.3–17.7)
IMM GRANULOCYTES # BLD: 0 10E9/L (ref 0–0.4)
IMM GRANULOCYTES NFR BLD: 0.6 %
LYMPHOCYTES # BLD AUTO: 0.9 10E9/L (ref 0.8–5.3)
LYMPHOCYTES NFR BLD AUTO: 25.2 %
MCH RBC QN AUTO: 31.7 PG (ref 26.5–33)
MCHC RBC AUTO-ENTMCNC: 33.2 G/DL (ref 31.5–36.5)
MCV RBC AUTO: 95 FL (ref 78–100)
MONOCYTES # BLD AUTO: 0.5 10E9/L (ref 0–1.3)
MONOCYTES NFR BLD AUTO: 13.6 %
NEUTROPHILS # BLD AUTO: 1.9 10E9/L (ref 1.6–8.3)
NEUTROPHILS NFR BLD AUTO: 56.2 %
NRBC # BLD AUTO: 0 10*3/UL
NRBC BLD AUTO-RTO: 0 /100
PLATELET # BLD AUTO: 247 10E9/L (ref 150–450)
POTASSIUM SERPL-SCNC: 3.8 MMOL/L (ref 3.4–5.3)
PROT SERPL-MCNC: 6.6 G/DL (ref 6.8–8.8)
RBC # BLD AUTO: 3.69 10E12/L (ref 4.4–5.9)
SODIUM SERPL-SCNC: 142 MMOL/L (ref 133–144)
WBC # BLD AUTO: 3.5 10E9/L (ref 4–11)

## 2020-02-04 PROCEDURE — 85025 COMPLETE CBC W/AUTO DIFF WBC: CPT | Performed by: PEDIATRICS

## 2020-02-04 PROCEDURE — 96411 CHEMO IV PUSH ADDL DRUG: CPT

## 2020-02-04 PROCEDURE — 25800030 ZZH RX IP 258 OP 636: Mod: ZF | Performed by: NURSE PRACTITIONER

## 2020-02-04 PROCEDURE — 96409 CHEMO IV PUSH SNGL DRUG: CPT

## 2020-02-04 PROCEDURE — 25000128 H RX IP 250 OP 636: Mod: ZF | Performed by: PEDIATRICS

## 2020-02-04 PROCEDURE — 93306 TTE W/DOPPLER COMPLETE: CPT

## 2020-02-04 PROCEDURE — 40000257 ZZH STATISTIC CONSULT NO CHARGE VASC ACCESS: Mod: ZF

## 2020-02-04 PROCEDURE — 25000128 H RX IP 250 OP 636: Mod: ZF

## 2020-02-04 PROCEDURE — 40000557 ZZH STATISTIC PORT-A-CATH ACCESS/FLUSHING: Mod: ZF

## 2020-02-04 PROCEDURE — 25800030 ZZH RX IP 258 OP 636: Mod: KP,ZF | Performed by: PEDIATRICS

## 2020-02-04 PROCEDURE — 80053 COMPREHEN METABOLIC PANEL: CPT | Performed by: PEDIATRICS

## 2020-02-04 PROCEDURE — 96375 TX/PRO/DX INJ NEW DRUG ADDON: CPT | Mod: 59

## 2020-02-04 RX ORDER — PANTOPRAZOLE SODIUM 40 MG/1
40 TABLET, DELAYED RELEASE ORAL
Qty: 30 TABLET | Refills: 6 | Status: ON HOLD | OUTPATIENT
Start: 2020-02-04 | End: 2020-02-20

## 2020-02-04 RX ORDER — ONDANSETRON 2 MG/ML
INJECTION INTRAMUSCULAR; INTRAVENOUS
Status: COMPLETED
Start: 2020-02-04 | End: 2020-02-04

## 2020-02-04 RX ORDER — HEPARIN SODIUM (PORCINE) LOCK FLUSH IV SOLN 100 UNIT/ML 100 UNIT/ML
5 SOLUTION INTRAVENOUS
Status: DISCONTINUED | OUTPATIENT
Start: 2020-02-04 | End: 2020-02-04 | Stop reason: HOSPADM

## 2020-02-04 RX ORDER — ONDANSETRON 8 MG/1
8 TABLET, ORALLY DISINTEGRATING ORAL EVERY 6 HOURS PRN
Qty: 20 TABLET | Refills: 6 | Status: SHIPPED | OUTPATIENT
Start: 2020-02-04 | End: 2020-02-21

## 2020-02-04 RX ORDER — ONDANSETRON 2 MG/ML
8 INJECTION INTRAMUSCULAR; INTRAVENOUS ONCE
Status: COMPLETED | OUTPATIENT
Start: 2020-02-04 | End: 2020-02-04

## 2020-02-04 RX ORDER — HEPARIN SODIUM (PORCINE) LOCK FLUSH IV SOLN 100 UNIT/ML 100 UNIT/ML
SOLUTION INTRAVENOUS
Status: COMPLETED
Start: 2020-02-04 | End: 2020-02-04

## 2020-02-04 RX ADMIN — HEPARIN SODIUM (PORCINE) LOCK FLUSH IV SOLN 100 UNIT/ML 5 ML: 100 SOLUTION at 15:34

## 2020-02-04 RX ADMIN — SODIUM CHLORIDE 20 ML: 9 INJECTION, SOLUTION INTRAVENOUS at 15:34

## 2020-02-04 RX ADMIN — HEPARIN 5 ML: 100 SYRINGE at 15:34

## 2020-02-04 RX ADMIN — METHOTREXATE 600 MG: 25 INJECTION INTRA-ARTERIAL; INTRAMUSCULAR; INTRATHECAL; INTRAVENOUS at 15:31

## 2020-02-04 RX ADMIN — ONDANSETRON 8 MG: 2 INJECTION INTRAMUSCULAR; INTRAVENOUS at 14:20

## 2020-02-04 RX ADMIN — VINCRISTINE SULFATE 2 MG: 1 INJECTION, SOLUTION INTRAVENOUS at 15:19

## 2020-02-04 ASSESSMENT — MIFFLIN-ST. JEOR: SCORE: 1909.38

## 2020-02-04 NOTE — PROGRESS NOTES
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 Lazaro presented with acute onset cough and was found to have an anterior mediastinal mass and malignant left-sided pleural effusion.  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  He was admitted to [facility] oncology service 8/30 and started on treatment per [id number].  He had a pleural effusion that required a chest tube and a pericardial effusion that was not drained. His Induction was complicated by cardiac compression secondary to his mass leading to tamponade, this improved through out his hospital stay.  He also had dysphagia that improved with treatment of his mass, swallow study was normal. His course was recently complicated by extensive bilateral UE DVTs, he remains on anticoagulation.  Most recently his course was complicated by the development of severe asparaginase induced pancreatitis. He became quite ill with SIRS and associated hypotension, he required pressor support in the ICU.  Fortunately Lazaro recovered well and his subsequent imaging has continued to show improvement.  He comes to clinic today for Day 41 of Interim Maintenance #1.    HPI:   Lazaro comes to clinic today by himself.  He reports that he has been doing pretty well since his last visit here.  He's had 2-3 days of nausea after each dose of chemo during this course.  He puts on a scopolamine patch and takes zofran with decent relief.  Over the past week he has been feeling very well.  He decided to quit smoking cold turkey and it has been >1 week since he last had a cigarette, he does not vape.  Lazaro reports occasional use of marijuana as well.    Lazaro's appetite has been good.  He denies GI upset, remains on protonix.  Denies any abdominal pain. No constipation or diarrhea.  He has not had fever. He notes a skin irritation behind his left ear,  he wears headphones frequently when he is tami. Sleeping well but his schedule is goofed up and he generally sleeps from 6am-2pm.  Energy level is stable. No paresthesias. Bruising from lovenox, no bleeding.    Review of systems:  Remainder of ROS is complete and negative.    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  He is looking forward to an [employer] anime movie that is coming out in a few weeks.    Current Medications:  Current Outpatient Medications on File Prior to Visit   Medication Sig Dispense Refill     diphenhydrAMINE (BENADRYL) 25 MG capsule Take 1-2 capsules (25-50 mg) by mouth every 6 hours as needed (Breakthrough Nausea and Vomiting ) 30 capsule 1     enoxaparin ANTICOAGULANT (LOVENOX) 100 MG/ML syringe Inject 1 mL (100 mg) Subcutaneous every 12 hours 180 mL 0     ondansetron (ZOFRAN-ODT) 8 MG ODT tab Take 1 tablet (8 mg) by mouth every 6 hours as needed for nausea 20 tablet 0     pantoprazole (PROTONIX) 40 MG EC tablet Take 1 tablet (40 mg) by mouth every morning (before breakfast) 30 tablet 0     polyethylene glycol (MIRALAX/GLYCOLAX) packet Take 17 g by  "mouth daily 30 packet 0     scopolamine (TRANSDERM) 1 MG/3DAYS 72 hr patch Place 1 patch onto the skin every 72 hours 10 patch 3     sennosides (SENOKOT) 8.6 MG tablet Take 2 tablets by mouth 2 times daily as needed for constipation 60 each 1     sulfamethoxazole-trimethoprim (BACTRIM DS/SEPTRA DS) 800-160 MG tablet Take 1 tablet by mouth Every Mon, Tues two times daily 16 tablet 3     Vitamin D, Cholecalciferol, 25 MCG (1000 UT) TABS Take 2 tablets (2,000 Units) by mouth daily 60 tablet 3   Lazaro reports he is taking bactrim, protonix and lovenox regularly     Received influenza vaccine for the 0230-3650 season.      Physical Exam:   Temp:  [98  F (36.7  C)] 98  F (36.7  C)  Pulse:  [103] 103  Resp:  [16] 16  BP: (135)/(51) 135/51  SpO2:  [99 %] 99 %    Wt Readings from Last 4 Encounters:   02/04/20 85.5 kg (188 lb 7.9 oz)   01/23/20 82.7 kg (182 lb 5.1 oz)   01/13/20 82.9 kg (182 lb 12.2 oz)   01/02/20 79.5 kg (175 lb 4.3 oz)     Ht Readings from Last 2 Encounters:   02/04/20 1.847 m (6' 0.72\")   01/23/20 1.848 m (6' 0.76\")     Lazaro feels his baseline weight was 180 lbs.  General: Lazaro is alert, interactive and appropriate. He appears well and is talkative.  HEENT: Skull is atrauamatic and normocephalic.  Full head of hair. PERRLA, sclera are non icteric and not injected, EOM are intact, gaze slightly disconjugate which is his baseline. Nares are patent without drainage. Oropharynx clear, no plaques noted. Buccal mucosa and tongue clear. MMM. Tympanic membranes are opaque bilaterally with light reflex and landmarks present.  Voice no longer hoarse.  Lymph:  Neck is supple without lymphadenopathy.  There is no supraclavicular, axillary or inguinal lymphadenopathy palpated.  Cardiovascular:  HR is regular, S1, S2 no murmur.  Capillary refill is < 2 seconds. Right arm without edema or erythema.  No pitting edema.  Cap refill < 2 sec. Peripheral pulses 2+/=. He has mildly distended veins noted on the left upper " chest, not extending up to the neck, overlying the pectoralis.   Respiratory: No cough noted. Respirations are easy.  Lungs are clear to auscultation through out.  No crackles or wheezes.  Gastrointestinal:  BS present in all quadrants.  Abdomen is soft and flat.  Lazaro denies LUQ discomfort, no pain with palpation. No hepatosplenomegaly or masses are palpated.  Skin: Raised soft lesion in the left post auricular area.  No fluctuance or drainage.  Erythema does not extend beyond lesion itself. No pain.  Bruises on thighs. No other skin lesions noted.  Neurological:  CN 2-12 grossly intact. Gait is normal.  No issues with balance. Sensation intact in hands and feet.     Musculoskeletal:  Good strength and ROM in all extremities.  Strong dorsiflexion at ankles and great toes (5/5) bilaterally without any pain at the Achilles.    Labs:   Results for orders placed or performed in visit on 02/04/20   CBC with platelets differential     Status: Abnormal   Result Value Ref Range    WBC 3.5 (L) 4.0 - 11.0 10e9/L    RBC Count 3.69 (L) 4.4 - 5.9 10e12/L    Hemoglobin 11.7 (L) 13.3 - 17.7 g/dL    Hematocrit 35.2 (L) 40.0 - 53.0 %    MCV 95 78 - 100 fl    MCH 31.7 26.5 - 33.0 pg    MCHC 33.2 31.5 - 36.5 g/dL    RDW 13.8 10.0 - 15.0 %    Platelet Count 247 150 - 450 10e9/L    Diff Method Automated Method     % Neutrophils 56.2 %    % Lymphocytes 25.2 %    % Monocytes 13.6 %    % Eosinophils 4.1 %    % Basophils 0.3 %    % Immature Granulocytes 0.6 %    Nucleated RBCs 0 0 /100    Absolute Neutrophil 1.9 1.6 - 8.3 10e9/L    Absolute Lymphocytes 0.9 0.8 - 5.3 10e9/L    Absolute Monocytes 0.5 0.0 - 1.3 10e9/L    Absolute Eosinophils 0.1 0.0 - 0.7 10e9/L    Absolute Basophils 0.0 0.0 - 0.2 10e9/L    Abs Immature Granulocytes 0.0 0 - 0.4 10e9/L    Absolute Nucleated RBC 0.0    Comprehensive metabolic panel     Status: Abnormal   Result Value Ref Range    Sodium 142 133 - 144 mmol/L    Potassium 3.8 3.4 - 5.3 mmol/L    Chloride 109 94  - 109 mmol/L    Carbon Dioxide 26 20 - 32 mmol/L    Anion Gap 7 3 - 14 mmol/L    Glucose 97 70 - 99 mg/dL    Urea Nitrogen 12 7 - 30 mg/dL    Creatinine 0.67 0.66 - 1.25 mg/dL    GFR Estimate >90 >60 mL/min/[1.73_m2]    GFR Estimate If Black >90 >60 mL/min/[1.73_m2]    Calcium 9.0 8.5 - 10.1 mg/dL    Bilirubin Total 0.2 0.2 - 1.3 mg/dL    Albumin 3.8 3.4 - 5.0 g/dL    Protein Total 6.6 (L) 6.8 - 8.8 g/dL    Alkaline Phosphatase 73 40 - 150 U/L     (H) 0 - 70 U/L    AST 23 0 - 45 U/L   Results for orders placed or performed during the hospital encounter of 20   Echo Pediatric (TTE) Complete     Status: None    Narrative    [id number]  [facility]  [id number]  [id number]^SANDIP^FAUSTO^PINEDA                                                                   Study ID: [id number]                                                 [facility] [place] [address]                                                Phone: [phone]                                Pediatric Echocardiogram  _____________________________________________________________________________  __     Name: PLACIDO RODRIGUEZ  Study Date: 2020 12:25 PM               Patient Location: [facility]  MRN: [id number]                               Age: 19 yrs  : 2000                               BP: 130/87 mmHg  Gender: Male                                  HR: 77  Patient Class: Outpatient                     Height: 185 cm  Ordering Provider: FAUSTO OROPEZA             Weight: 83 kg  Referring Provider: FAUSTO OROPEZA       BSA: 2.1 m2  Performed By: Edura Pearson  Report approved by: Nadine Ziegler MD  Reason For Study: T lymphoblastic lymphoma (H)  _____________________________________________________________________________  __      ------CONCLUSIONS------  Normal echocardiogram. There is normal appearance and motion of the tricuspid,  mitral, pulmonary and aortic valves. No atrial, ventricular or arterial level  shunting. The left and right ventricles have normal chamber size, wall  thickness, and systolic function. The calculated biplane left ventricular  ejection fraction is 67 %.  _____________________________________________________________________________  __        Technical information:  A complete two dimensional, MMODE, spectral and color Doppler transthoracic  echocardiogram is performed. The study quality is adequate. Images are  obtained from parasternal, apical, subcostal and suprasternal notch views.  Prior echocardiogram available for comparison. ECG tracing shows regular  rhythm.     Segmental Anatomy:  There is normal atrial arrangement, with concordant atrioventricular and  ventriculoarterial connections.     Systemic and pulmonary veins:  Normal coronary sinus. Color flow demonstrates flow from two pulmonary veins  entering the left atrium.     Atria and atrial septum:  Normal right atrial size. The left atrium is normal in size. There is no  atrial level shunting.        Atrioventricular valves:  The tricuspid valve is normal in appearance and motion. Trivial tricuspid  valve insufficiency. Insufficient jet to estimate right ventricular systolic  pressure. The mitral valve is normal in appearance and motion. There is no  mitral valve insufficiency.     Ventricles and Ventricular Septum:  The left and right ventricles have normal chamber size, wall thickness, and  systolic function. The calculated biplane left ventricular ejection fraction  is 67 %. There is no ventricular level shunting.     Outflow tracts:  Normal great artery relationship. There is unobstructed flow through the right  ventricular outflow tract. The pulmonary valve motion is normal. There is  normal flow across the pulmonary valve. Trivial pulmonary  valve insufficiency.  There is unobstructed flow through the left ventricular outflow tract.  Tricuspid aortic valve with normal appearance and motion. There is normal flow  across the aortic valve.     Great arteries:  The main pulmonary artery has normal appearance. There is unobstructed flow in  the main pulmonary artery. The pulmonary artery bifurcation is normal. There  is unobstructed flow in both branch pulmonary arteries. Normal ascending  aorta. The aortic arch appears normal. There is unobstructed antegrade flow in  the ascending, transverse arch, descending thoracic and abdominal aorta.     Arterial Shunts:  There is no arterial level shunting.     Coronaries:  The coronary arteries are not evaluated.        Effusions, catheters, cannulas and leads:  No pericardial effusion.     MMode/2D Measurements & Calculations  LA dimension: 2.6 cm                       Ao root diam: 3.2 cm  LA/Ao: 0.82                                             LVLd %diff: 0.89 %                                             LVLs %diff: -1.8 %                                             EF(MOD-bp): 66.5 %  LVMI(BSA): 82.8 grams/m2                   LVMI(Height): 32.5  RWT(MM): 0.31        Doppler Measurements & Calculations  Ao V2 max: 106.6 cm/sec                 LV V1 max: 99.2 cm/sec  Ao max P.5 mmHg                     LV V1 max PG: 3.9 mmHg     desc Ao max marialuisa: 92.8 cm/sec  desc Ao max PG: 3.4 mmHg     [place] Z-Scores (Measurements & Calculations)  Measurement NameValue      Z-ScorePredictedNormal Range  IVSd(MM)        0.79 cm    -1.5   1.0      0.72 - 1.36  LVIDd(MM)       5.6 cm     0.72   5.3      4.5 - 6.1  LVIDs(MM)       3.7 cm     0.56   3.4      2.7 - 4.2  LVPWd(MM)       0.86 cm    -0.85  0.98     0.71 - 1.24  LV mass(C)d(MM) 171.0 grams-0.86  203.2    137.0 - 301.5  FS(MM)          34.6 %     -0.07  34.8     28.5 - 42.7           Report approved by: Jocy Hebert 2020 12:51 PM         Assessment:  Lazaro Lund is a 19 year old young man with T cell lymphoblastic lymphoma (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  He is being treated per [facility] protocol [id number].   He's had a CRu following Induction with a CR at the end of Consolidation. His course has been complicated by extensive bilateral DVT and severe pancreatitis.  He continues on lovenox, most recent U/S is stable.  Recent abdominal CT shows continued improvement in regards to the sequelae from his pancreatitis.  Asparaginase will be permanently discontinued from his treatment regimen. He is on Vit D for deficiency.  TPMT/NUDT15 genotype is normal.  He comes to clinic today for Day 41 of Interim Maintenance #1.  He has a small irritation in his post auricular area that I think is from a combination of his scopolamine patch and headphones.  Blood counts look good today.  He recently quit smoking.      Plan:   1) Reviewed labs with Lazaro will escalate methotrexate per protocol.  Refilled antiemetics.  He will call if nausea is not well controlled.  2) Messaged Dr Coronel to see what follow up he would like for Lazaro in regards to his pancreatitis.  His last note indicates he would like to follow him with imaging until he has complete resolution.  We do not plan on any routine imaging in regards to his lymphoma.   3) Continue lovenox at current dose, recent level in goal range of 0.6-1.  Will check monthly (due later this month).  He is aware of the need to hold PM prior and AM of his LPs. Will maintain platelet count > 30K while on anticoagulation.  Recent U/S stable, will discuss as a team if Lazaro needs to continue anticoagulation now that he has been on lovenox for >3 months and will not receive any further Asparaginase therapy.  If he needs to remain on anticoagulation consider transitioning to oral.  4) Continue Vit D 3 2000IU daily, recheck level in late February.  5) Baseline echo done today and looks excellent.  6) Day 29 Induction LP  deferred, original plan was to make up on Day 29 of Consolidation however given his recent complications we decided to defer this to Maintenance therapy.  7) TPMT and NUDT15 genotype is normal, plan for regular dose thiopurines.  8) Given significant spinal headache history will plan for IV caffeine following LPs in the future.   9) Discussed next course, Delayed Intensification, at length with Lazaro including medications, dosing and potential side effects.  Provided calendar as well as chemotherapy information sheets.  10) Praised Lazaro for his effort at smoking cessation.  He declined additional resources.  Discussed marijuana use and that I would strongly recommend he avoid using marijuana, especially during Delayed Intensification due to risk for fungal infection.  11)  Avoid picking or further irritation of skin lesion.  Recommend he put scopolamine behind his other ear.  Asked him to call if area increases in size, becomes painful or is draining.    12)  RTC on 2/20 to start DI pending counts.  Sooner with concerns or new symptoms.     Addendum: [place] back from Dr Coronel, he is happy with Lazaro's progress. His team will arrange a repeat CT at the end of April.

## 2020-02-04 NOTE — LETTER
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 Lazaro presented with acute onset cough and was found to have an anterior mediastinal mass and malignant left-sided pleural effusion.  A CT guided biopsy was obtained at 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  He was admitted to [facility] oncology service 8/30 and started on treatment per [id number].  He had a pleural effusion that required a chest tube and a pericardial effusion that was not drained. His Induction was complicated by cardiac compression secondary to his mass leading to tamponade, this improved through out his hospital stay.  He also had dysphagia that improved with treatment of his mass, swallow study was normal. His course was recently complicated by extensive bilateral UE DVTs, he remains on anticoagulation.  Most recently his course was complicated by the development of severe asparaginase induced pancreatitis. He became quite ill with SIRS and associated hypotension, he required pressor support in the ICU.  Fortunately Lazaro recovered well and his subsequent imaging has continued to show improvement.  He comes to clinic today for Day 41 of Interim Maintenance #1.    HPI:   Lazaro comes to clinic today by himself.  He reports that he has been doing pretty well since his last visit here.  He's had 2-3 days of nausea after each dose of chemo during this course.  He puts on a scopolamine patch and takes zofran with decent relief.  Over the past week he has been feeling very well.  He decided to quit smoking cold turkey and it has been >1 week since he last had a cigarette, he does not vape.  Lazaro reports occasional use of marijuana as well.    Lazaro's appetite has been good.  He denies GI upset, remains on protonix.  Denies any abdominal pain. No constipation or  diarrhea.  He has not had fever. He notes a skin irritation behind his left ear, he wears headphones frequently when he is tami. Sleeping well but his schedule is goofed up and he generally sleeps from 6am-2pm.  Energy level is stable. No paresthesias. Bruising from lovenox, no bleeding.    Review of systems:  Remainder of ROS is complete and negative.    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  He is looking forward to an Spanish anime movie that is coming out in a few weeks.    Current Medications:  Current Outpatient Medications on File Prior to Visit   Medication Sig Dispense Refill     diphenhydrAMINE (BENADRYL) 25 MG capsule Take 1-2 capsules (25-50 mg) by mouth every 6 hours as needed (Breakthrough Nausea and Vomiting ) 30 capsule 1     enoxaparin ANTICOAGULANT (LOVENOX) 100 MG/ML syringe Inject 1 mL (100 mg) Subcutaneous every 12 hours 180 mL 0     ondansetron (ZOFRAN-ODT) 8 MG ODT tab Take 1 tablet (8 mg) by mouth every 6 hours as needed for nausea 20 tablet 0     pantoprazole (PROTONIX) 40 MG EC tablet Take 1 tablet (40 mg) by mouth every morning (before  "breakfast) 30 tablet 0     polyethylene glycol (MIRALAX/GLYCOLAX) packet Take 17 g by mouth daily 30 packet 0     scopolamine (TRANSDERM) 1 MG/3DAYS 72 hr patch Place 1 patch onto the skin every 72 hours 10 patch 3     sennosides (SENOKOT) 8.6 MG tablet Take 2 tablets by mouth 2 times daily as needed for constipation 60 each 1     sulfamethoxazole-trimethoprim (BACTRIM DS/SEPTRA DS) 800-160 MG tablet Take 1 tablet by mouth Every Mon, Tues two times daily 16 tablet 3     Vitamin D, Cholecalciferol, 25 MCG (1000 UT) TABS Take 2 tablets (2,000 Units) by mouth daily 60 tablet 3   Lazaro reports he is taking bactrim, protonix and lovenox regularly     Received influenza vaccine for the 3100-2694 season.      Physical Exam:   Temp:  [98  F (36.7  C)] 98  F (36.7  C)  Pulse:  [103] 103  Resp:  [16] 16  BP: (135)/(51) 135/51  SpO2:  [99 %] 99 %    Wt Readings from Last 4 Encounters:   02/04/20 85.5 kg (188 lb 7.9 oz)   01/23/20 82.7 kg (182 lb 5.1 oz)   01/13/20 82.9 kg (182 lb 12.2 oz)   01/02/20 79.5 kg (175 lb 4.3 oz)     Ht Readings from Last 2 Encounters:   02/04/20 1.847 m (6' 0.72\")   01/23/20 1.848 m (6' 0.76\")     Lazaro feels his baseline weight was 180 lbs.  General: Lazaro is alert, interactive and appropriate. He appears well and is talkative.  HEENT: Skull is atrauamatic and normocephalic.  Full head of hair. PERRLA, sclera are non icteric and not injected, EOM are intact, gaze slightly disconjugate which is his baseline. Nares are patent without drainage. Oropharynx clear, no plaques noted. Buccal mucosa and tongue clear. MMM. Tympanic membranes are opaque bilaterally with light reflex and landmarks present.  Voice no longer hoarse.  Lymph:  Neck is supple without lymphadenopathy.  There is no supraclavicular, axillary or inguinal lymphadenopathy palpated.  Cardiovascular:  HR is regular, S1, S2 no murmur.  Capillary refill is < 2 seconds. Right arm without edema or erythema.  No pitting edema.  Cap refill < " 2 sec. Peripheral pulses 2+/=. He has mildly distended veins noted on the left upper chest, not extending up to the neck, overlying the pectoralis.   Respiratory: No cough noted. Respirations are easy.  Lungs are clear to auscultation through out.  No crackles or wheezes.  Gastrointestinal:  BS present in all quadrants.  Abdomen is soft and flat.  Lazaro denies LUQ discomfort, no pain with palpation. No hepatosplenomegaly or masses are palpated.  Skin: Raised soft lesion in the left post auricular area.  No fluctuance or drainage.  Erythema does not extend beyond lesion itself. No pain.  Bruises on thighs. No other skin lesions noted.  Neurological:  CN 2-12 grossly intact. Gait is normal.  No issues with balance. Sensation intact in hands and feet.     Musculoskeletal:  Good strength and ROM in all extremities.  Strong dorsiflexion at ankles and great toes (5/5) bilaterally without any pain at the Achilles.    Labs:   Results for orders placed or performed in visit on 02/04/20   CBC with platelets differential     Status: Abnormal   Result Value Ref Range    WBC 3.5 (L) 4.0 - 11.0 10e9/L    RBC Count 3.69 (L) 4.4 - 5.9 10e12/L    Hemoglobin 11.7 (L) 13.3 - 17.7 g/dL    Hematocrit 35.2 (L) 40.0 - 53.0 %    MCV 95 78 - 100 fl    MCH 31.7 26.5 - 33.0 pg    MCHC 33.2 31.5 - 36.5 g/dL    RDW 13.8 10.0 - 15.0 %    Platelet Count 247 150 - 450 10e9/L    Diff Method Automated Method     % Neutrophils 56.2 %    % Lymphocytes 25.2 %    % Monocytes 13.6 %    % Eosinophils 4.1 %    % Basophils 0.3 %    % Immature Granulocytes 0.6 %    Nucleated RBCs 0 0 /100    Absolute Neutrophil 1.9 1.6 - 8.3 10e9/L    Absolute Lymphocytes 0.9 0.8 - 5.3 10e9/L    Absolute Monocytes 0.5 0.0 - 1.3 10e9/L    Absolute Eosinophils 0.1 0.0 - 0.7 10e9/L    Absolute Basophils 0.0 0.0 - 0.2 10e9/L    Abs Immature Granulocytes 0.0 0 - 0.4 10e9/L    Absolute Nucleated RBC 0.0    Comprehensive metabolic panel     Status: Abnormal   Result Value Ref  Range    Sodium 142 133 - 144 mmol/L    Potassium 3.8 3.4 - 5.3 mmol/L    Chloride 109 94 - 109 mmol/L    Carbon Dioxide 26 20 - 32 mmol/L    Anion Gap 7 3 - 14 mmol/L    Glucose 97 70 - 99 mg/dL    Urea Nitrogen 12 7 - 30 mg/dL    Creatinine 0.67 0.66 - 1.25 mg/dL    GFR Estimate >90 >60 mL/min/[1.73_m2]    GFR Estimate If Black >90 >60 mL/min/[1.73_m2]    Calcium 9.0 8.5 - 10.1 mg/dL    Bilirubin Total 0.2 0.2 - 1.3 mg/dL    Albumin 3.8 3.4 - 5.0 g/dL    Protein Total 6.6 (L) 6.8 - 8.8 g/dL    Alkaline Phosphatase 73 40 - 150 U/L     (H) 0 - 70 U/L    AST 23 0 - 45 U/L   Results for orders placed or performed during the hospital encounter of 20   Echo Pediatric (TTE) Complete     Status: None    [facility]    [id number]  [id number]  [id number]^SANDIP^FAUSTO^PINEDA                                                                   Study ID: [id number]                                                 [facility] [place] [id number]                                                Phone: [phone]                                Pediatric Echocardiogram  _____________________________________________________________________________  __     Name: PLACIDO RODRIGUEZ  Study Date: 2020 12:25 PM               Patient Location: [facility]  MRN: [id number]                               Age: 19 yrs  : 2000                               BP: 130/87 mmHg  Gender: Male                                  HR: 77  Patient Class: Outpatient                     Height: 185 cm  Ordering Provider: FAUSTO OROPEZA             Weight: 83 kg  Referring Provider: FAUSTO OROPEZA       BSA: 2.1 m2  Performed By: Eduar Pearson  Report approved by: Nadine Ziegler MD  Reason For Study: T lymphoblastic lymphoma  (H)  _____________________________________________________________________________  __     ------CONCLUSIONS------  Normal echocardiogram. There is normal appearance and motion of the tricuspid,  mitral, pulmonary and aortic valves. No atrial, ventricular or arterial level  shunting. The left and right ventricles have normal chamber size, wall  thickness, and systolic function. The calculated biplane left ventricular  ejection fraction is 67 %.  _____________________________________________________________________________  __        Technical information:  A complete two dimensional, MMODE, spectral and color Doppler transthoracic  echocardiogram is performed. The study quality is adequate. Images are  obtained from parasternal, apical, subcostal and suprasternal notch views.  Prior echocardiogram available for comparison. ECG tracing shows regular  rhythm.     Segmental Anatomy:  There is normal atrial arrangement, with concordant atrioventricular and  ventriculoarterial connections.     Systemic and pulmonary veins:  Normal coronary sinus. Color flow demonstrates flow from two pulmonary veins  entering the left atrium.     Atria and atrial septum:  Normal right atrial size. The left atrium is normal in size. There is no  atrial level shunting.        Atrioventricular valves:  The tricuspid valve is normal in appearance and motion. Trivial tricuspid  valve insufficiency. Insufficient jet to estimate right ventricular systolic  pressure. The mitral valve is normal in appearance and motion. There is no  mitral valve insufficiency.     Ventricles and Ventricular Septum:  The left and right ventricles have normal chamber size, wall thickness, and  systolic function. The calculated biplane left ventricular ejection fraction  is 67 %. There is no ventricular level shunting.     Outflow tracts:  Normal great artery relationship. There is unobstructed flow through the right  ventricular outflow tract. The pulmonary valve  motion is normal. There is  normal flow across the pulmonary valve. Trivial pulmonary valve insufficiency.  There is unobstructed flow through the left ventricular outflow tract.  Tricuspid aortic valve with normal appearance and motion. There is normal flow  across the aortic valve.     Great arteries:  The main pulmonary artery has normal appearance. There is unobstructed flow in  the main pulmonary artery. The pulmonary artery bifurcation is normal. There  is unobstructed flow in both branch pulmonary arteries. Normal ascending  aorta. The aortic arch appears normal. There is unobstructed antegrade flow in  the ascending, transverse arch, descending thoracic and abdominal aorta.     Arterial Shunts:  There is no arterial level shunting.     Coronaries:  The coronary arteries are not evaluated.        Effusions, catheters, cannulas and leads:  No pericardial effusion.     MMode/2D Measurements & Calculations  LA dimension: 2.6 cm                       Ao root diam: 3.2 cm  LA/Ao: 0.82                                             LVLd %diff: 0.89 %                                             LVLs %diff: -1.8 %                                             EF(MOD-bp): 66.5 %  LVMI(BSA): 82.8 grams/m2                   LVMI(Height): 32.5  RWT(MM): 0.31        Doppler Measurements & Calculations  Ao V2 max: 106.6 cm/sec                 LV V1 max: 99.2 cm/sec  Ao max P.5 mmHg                     LV V1 max PG: 3.9 mmHg     desc Ao max mairaluisa: 92.8 cm/sec  desc Ao max PG: 3.4 mmHg     Revelo Z-Scores (Measurements & Calculations)  Measurement NameValue      Z-ScorePredictedNormal Range  IVSd(MM)        0.79 cm    -1.5   1.0      0.72 - 1.36  LVIDd(MM)       5.6 cm     0.72   5.3      4.5 - 6.1  LVIDs(MM)       3.7 cm     0.56   3.4      2.7 - 4.2  LVPWd(MM)       0.86 cm    -0.85  0.98     0.71 - 1.24  LV mass(C)d(MM) 171.0 grams-0.86  203.2    137.0 - 301.5  FS(MM)          34.6 %     -0.07  34.8     28.5 - 42.7      Report approved by: Jocy Hebert 02/04/2020 12:51 PM        Assessment:  Lazaro Lund is a 19 year old young man with T cell lymphoblastic lymphoma (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  He is being treated per COG protocol [id number].   He's had a CRu following Induction with a CR at the end of Consolidation. His course has been complicated by extensive bilateral DVT and severe pancreatitis.  He continues on lovenox, most recent U/S is stable.  Recent abdominal CT shows continued improvement in regards to the sequelae from his pancreatitis.  Asparaginase will be permanently discontinued from his treatment regimen. He is on Vit D for deficiency.  TPMT/NUDT15 genotype is normal.  He comes to clinic today for Day 41 of Interim Maintenance #1.  He has a small irritation in his post auricular area that I think is from a combination of his scopolamine patch and headphones.  Blood counts look good today.  He recently quit smoking.      Plan:   1) Reviewed labs with Lazaro will escalate methotrexate per protocol.  Refilled antiemetics.  He will call if nausea is not well controlled.  2) Messaged Dr Coronel to see what follow up he would like for Lazaro in regards to his pancreatitis.  His last note indicates he would like to follow him with imaging until he has complete resolution.  We do not plan on any routine imaging in regards to his lymphoma.   3) Continue lovenox at current dose, recent level in goal range of 0.6-1.  Will check monthly (due later this month).  He is aware of the need to hold PM prior and AM of his LPs. Will maintain platelet count > 30K while on anticoagulation.  Recent U/S stable, will discuss as a team if Lazaro needs to continue anticoagulation now that he has been on lovenox for >3 months and will not receive any further Asparaginase therapy.  If he needs to remain on anticoagulation consider transitioning to oral.  4) Continue Vit D 3 2000IU daily, recheck level in late February.  5) Baseline  echo done today and looks excellent.  6) Day 29 Induction LP deferred, original plan was to make up on Day 29 of Consolidation however given his recent complications we decided to defer this to Maintenance therapy.  7) TPMT and NUDT15 genotype is normal, plan for regular dose thiopurines.  8) Given significant spinal headache history will plan for IV caffeine following LPs in the future.   9) Discussed next course, Delayed Intensification, at length with Lazaro including medications, dosing and potential side effects.  Provided calendar as well as chemotherapy information sheets.  10) Praised Lazaro for his effort at smoking cessation.  He declined additional resources.  Discussed marijuana use and that I would strongly recommend he avoid using marijuana, especially during Delayed Intensification due to risk for fungal infection.  11)  Avoid picking or further irritation of skin lesion.  Recommend he put scopolamine behind his other ear.  Asked him to call if area increases in size, becomes painful or is draining.    12)  RTC on 2/20 to start DI pending counts.  Sooner with concerns or new symptoms.     Addendum: Edgar back from Dr Coronel, he is happy with Lazaro's progress. His team will arrange a repeat CT at the end of April.    YOHAN Dunn CNP

## 2020-02-04 NOTE — PROGRESS NOTES
Infusion Nursing Note    Lazaro Lund Presents to [facility] today for: C1D41 Vincristine/Methotrexate    Due to : T lymphoblastic lymphoma (H)    Intravenous Access/Labs: Port accessed by vascular access team. Blood return noted and labs drawn as ordered.    Infusion Note: Patient seen and assessed by Luana Van NP while in clinic today. All parameters met for chemo. Patient pre-medicated with IV Zofran. Vincristine given into port by gravity with blood return noted pre/mid/post infusion. Methotrexate given into port over 10 minutes with blood return noted pre/post infusion. Vital signs remained stable throughout. Port flushed, heparin locked, and de-accessed following chemos.     Discharge Plan:   Patient verbalized understanding of discharge instructions. Pt left [facility] in stable condition.

## 2020-02-06 ENCOUNTER — PATIENT OUTREACH (OUTPATIENT)
Dept: GASTROENTEROLOGY | Facility: CLINIC | Age: 20
End: 2020-02-06

## 2020-02-06 DIAGNOSIS — K85.91 NECROTIZING PANCREATITIS: Primary | ICD-10-CM

## 2020-02-06 RX ORDER — ALBUTEROL SULFATE 0.83 MG/ML
2.5 SOLUTION RESPIRATORY (INHALATION)
Status: CANCELLED | OUTPATIENT
Start: 2020-04-09

## 2020-02-06 RX ORDER — DIPHENHYDRAMINE HYDROCHLORIDE 50 MG/ML
50 INJECTION INTRAMUSCULAR; INTRAVENOUS
Status: CANCELLED
Start: 2020-03-19

## 2020-02-06 RX ORDER — ALBUTEROL SULFATE 90 UG/1
1-2 AEROSOL, METERED RESPIRATORY (INHALATION)
Status: CANCELLED
Start: 2020-02-20

## 2020-02-06 RX ORDER — EPINEPHRINE 1 MG/ML
0.3 INJECTION, SOLUTION, CONCENTRATE INTRAVENOUS EVERY 5 MIN PRN
Status: CANCELLED | OUTPATIENT
Start: 2020-03-19

## 2020-02-06 RX ORDER — METHYLPREDNISOLONE SODIUM SUCCINATE 125 MG/2ML
125 INJECTION, POWDER, LYOPHILIZED, FOR SOLUTION INTRAMUSCULAR; INTRAVENOUS
Status: CANCELLED | OUTPATIENT
Start: 2020-03-19

## 2020-02-06 RX ORDER — EPINEPHRINE 1 MG/ML
0.3 INJECTION, SOLUTION, CONCENTRATE INTRAVENOUS EVERY 5 MIN PRN
Status: CANCELLED | OUTPATIENT
Start: 2020-04-02

## 2020-02-06 RX ORDER — ONDANSETRON 2 MG/ML
8 INJECTION INTRAMUSCULAR; INTRAVENOUS ONCE
Status: CANCELLED
Start: 2020-03-26

## 2020-02-06 RX ORDER — SODIUM CHLORIDE 9 MG/ML
200 INJECTION, SOLUTION INTRAVENOUS CONTINUOUS PRN
Status: CANCELLED | OUTPATIENT
Start: 2020-04-09

## 2020-02-06 RX ORDER — METHYLPREDNISOLONE SODIUM SUCCINATE 125 MG/2ML
125 INJECTION, POWDER, LYOPHILIZED, FOR SOLUTION INTRAMUSCULAR; INTRAVENOUS
Status: CANCELLED | OUTPATIENT
Start: 2020-03-05

## 2020-02-06 RX ORDER — DIPHENHYDRAMINE HYDROCHLORIDE 50 MG/ML
25-50 INJECTION INTRAMUSCULAR; INTRAVENOUS EVERY 6 HOURS PRN
Status: CANCELLED
Start: 2020-03-19

## 2020-02-06 RX ORDER — DIPHENHYDRAMINE HCL 25 MG
25-50 CAPSULE ORAL EVERY 6 HOURS PRN
Status: CANCELLED
Start: 2020-02-27

## 2020-02-06 RX ORDER — METHYLPREDNISOLONE SODIUM SUCCINATE 125 MG/2ML
125 INJECTION, POWDER, LYOPHILIZED, FOR SOLUTION INTRAMUSCULAR; INTRAVENOUS
Status: CANCELLED | OUTPATIENT
Start: 2020-03-26

## 2020-02-06 RX ORDER — ALBUTEROL SULFATE 90 UG/1
1-2 AEROSOL, METERED RESPIRATORY (INHALATION)
Status: CANCELLED
Start: 2020-04-02

## 2020-02-06 RX ORDER — SODIUM CHLORIDE 9 MG/ML
200 INJECTION, SOLUTION INTRAVENOUS CONTINUOUS PRN
Status: CANCELLED | OUTPATIENT
Start: 2020-03-19

## 2020-02-06 RX ORDER — DIPHENHYDRAMINE HYDROCHLORIDE 50 MG/ML
50 INJECTION INTRAMUSCULAR; INTRAVENOUS
Status: CANCELLED
Start: 2020-03-05

## 2020-02-06 RX ORDER — EPINEPHRINE 1 MG/ML
0.3 INJECTION, SOLUTION, CONCENTRATE INTRAVENOUS EVERY 5 MIN PRN
Status: CANCELLED | OUTPATIENT
Start: 2020-03-05

## 2020-02-06 RX ORDER — SODIUM CHLORIDE 9 MG/ML
200 INJECTION, SOLUTION INTRAVENOUS CONTINUOUS PRN
Status: CANCELLED | OUTPATIENT
Start: 2020-03-26

## 2020-02-06 RX ORDER — ONDANSETRON 2 MG/ML
8 INJECTION INTRAMUSCULAR; INTRAVENOUS ONCE
Status: CANCELLED
Start: 2020-03-19

## 2020-02-06 RX ORDER — SODIUM CHLORIDE 9 MG/ML
200 INJECTION, SOLUTION INTRAVENOUS CONTINUOUS PRN
Status: CANCELLED | OUTPATIENT
Start: 2020-04-02

## 2020-02-06 RX ORDER — SODIUM CHLORIDE 9 MG/ML
200 INJECTION, SOLUTION INTRAVENOUS CONTINUOUS PRN
Status: CANCELLED | OUTPATIENT
Start: 2020-02-27

## 2020-02-06 RX ORDER — EPINEPHRINE 1 MG/ML
0.3 INJECTION, SOLUTION, CONCENTRATE INTRAVENOUS EVERY 5 MIN PRN
Status: CANCELLED | OUTPATIENT
Start: 2020-02-20

## 2020-02-06 RX ORDER — ONDANSETRON 2 MG/ML
8 INJECTION INTRAMUSCULAR; INTRAVENOUS ONCE
Status: CANCELLED
Start: 2020-03-05

## 2020-02-06 RX ORDER — EPINEPHRINE 1 MG/ML
0.3 INJECTION, SOLUTION, CONCENTRATE INTRAVENOUS EVERY 5 MIN PRN
Status: CANCELLED | OUTPATIENT
Start: 2020-02-27

## 2020-02-06 RX ORDER — DIPHENHYDRAMINE HYDROCHLORIDE 50 MG/ML
25-50 INJECTION INTRAMUSCULAR; INTRAVENOUS EVERY 6 HOURS PRN
Status: CANCELLED
Start: 2020-03-05

## 2020-02-06 RX ORDER — DIPHENHYDRAMINE HYDROCHLORIDE 50 MG/ML
50 INJECTION INTRAMUSCULAR; INTRAVENOUS
Status: CANCELLED
Start: 2020-02-27

## 2020-02-06 RX ORDER — ALBUTEROL SULFATE 90 UG/1
1-2 AEROSOL, METERED RESPIRATORY (INHALATION)
Status: CANCELLED
Start: 2020-03-19

## 2020-02-06 RX ORDER — SODIUM CHLORIDE 9 MG/ML
200 INJECTION, SOLUTION INTRAVENOUS CONTINUOUS PRN
Status: CANCELLED | OUTPATIENT
Start: 2020-03-05

## 2020-02-06 RX ORDER — ALBUTEROL SULFATE 0.83 MG/ML
2.5 SOLUTION RESPIRATORY (INHALATION)
Status: CANCELLED | OUTPATIENT
Start: 2020-02-20

## 2020-02-06 RX ORDER — ALBUTEROL SULFATE 90 UG/1
1-2 AEROSOL, METERED RESPIRATORY (INHALATION)
Status: CANCELLED
Start: 2020-03-26

## 2020-02-06 RX ORDER — ALBUTEROL SULFATE 0.83 MG/ML
2.5 SOLUTION RESPIRATORY (INHALATION)
Status: CANCELLED | OUTPATIENT
Start: 2020-03-26

## 2020-02-06 RX ORDER — DIPHENHYDRAMINE HYDROCHLORIDE 50 MG/ML
50 INJECTION INTRAMUSCULAR; INTRAVENOUS
Status: CANCELLED
Start: 2020-04-02

## 2020-02-06 RX ORDER — DIPHENHYDRAMINE HYDROCHLORIDE 50 MG/ML
50 INJECTION INTRAMUSCULAR; INTRAVENOUS
Status: CANCELLED
Start: 2020-02-20

## 2020-02-06 RX ORDER — DIPHENHYDRAMINE HYDROCHLORIDE 50 MG/ML
50 INJECTION INTRAMUSCULAR; INTRAVENOUS
Status: CANCELLED
Start: 2020-03-26

## 2020-02-06 RX ORDER — ALBUTEROL SULFATE 0.83 MG/ML
2.5 SOLUTION RESPIRATORY (INHALATION)
Status: CANCELLED | OUTPATIENT
Start: 2020-03-05

## 2020-02-06 RX ORDER — EPINEPHRINE 1 MG/ML
0.3 INJECTION, SOLUTION, CONCENTRATE INTRAVENOUS EVERY 5 MIN PRN
Status: CANCELLED | OUTPATIENT
Start: 2020-04-09

## 2020-02-06 RX ORDER — ALBUTEROL SULFATE 90 UG/1
1-2 AEROSOL, METERED RESPIRATORY (INHALATION)
Status: CANCELLED
Start: 2020-02-27

## 2020-02-06 RX ORDER — ALBUTEROL SULFATE 90 UG/1
1-2 AEROSOL, METERED RESPIRATORY (INHALATION)
Status: CANCELLED
Start: 2020-03-05

## 2020-02-06 RX ORDER — DIPHENHYDRAMINE HCL 25 MG
25-50 CAPSULE ORAL EVERY 6 HOURS PRN
Status: CANCELLED
Start: 2020-03-05

## 2020-02-06 RX ORDER — CYTARABINE 100 MG/ML
75 INJECTION, SOLUTION INTRATHECAL; INTRAVENOUS; SUBCUTANEOUS ONCE
Status: CANCELLED
Start: 2020-03-26

## 2020-02-06 RX ORDER — ALBUTEROL SULFATE 0.83 MG/ML
2.5 SOLUTION RESPIRATORY (INHALATION)
Status: CANCELLED | OUTPATIENT
Start: 2020-04-02

## 2020-02-06 RX ORDER — METHYLPREDNISOLONE SODIUM SUCCINATE 125 MG/2ML
125 INJECTION, POWDER, LYOPHILIZED, FOR SOLUTION INTRAMUSCULAR; INTRAVENOUS
Status: CANCELLED | OUTPATIENT
Start: 2020-02-20

## 2020-02-06 RX ORDER — ONDANSETRON 2 MG/ML
8 INJECTION INTRAMUSCULAR; INTRAVENOUS ONCE
Status: CANCELLED
Start: 2020-02-27

## 2020-02-06 RX ORDER — METHYLPREDNISOLONE SODIUM SUCCINATE 125 MG/2ML
125 INJECTION, POWDER, LYOPHILIZED, FOR SOLUTION INTRAMUSCULAR; INTRAVENOUS
Status: CANCELLED | OUTPATIENT
Start: 2020-04-09

## 2020-02-06 RX ORDER — DIPHENHYDRAMINE HYDROCHLORIDE 50 MG/ML
25-50 INJECTION INTRAMUSCULAR; INTRAVENOUS EVERY 6 HOURS PRN
Status: CANCELLED
Start: 2020-02-20

## 2020-02-06 RX ORDER — METHYLPREDNISOLONE SODIUM SUCCINATE 125 MG/2ML
125 INJECTION, POWDER, LYOPHILIZED, FOR SOLUTION INTRAMUSCULAR; INTRAVENOUS
Status: CANCELLED | OUTPATIENT
Start: 2020-02-27

## 2020-02-06 RX ORDER — DIPHENHYDRAMINE HCL 25 MG
25-50 CAPSULE ORAL EVERY 6 HOURS PRN
Status: CANCELLED
Start: 2020-03-19

## 2020-02-06 RX ORDER — SODIUM CHLORIDE 9 MG/ML
INJECTION, SOLUTION INTRAVENOUS CONTINUOUS
Status: CANCELLED
Start: 2020-03-19

## 2020-02-06 RX ORDER — DIPHENHYDRAMINE HYDROCHLORIDE 50 MG/ML
25-50 INJECTION INTRAMUSCULAR; INTRAVENOUS EVERY 6 HOURS PRN
Status: CANCELLED
Start: 2020-02-27

## 2020-02-06 RX ORDER — EPINEPHRINE 1 MG/ML
0.3 INJECTION, SOLUTION, CONCENTRATE INTRAVENOUS EVERY 5 MIN PRN
Status: CANCELLED | OUTPATIENT
Start: 2020-03-26

## 2020-02-06 RX ORDER — ONDANSETRON 2 MG/ML
8 INJECTION INTRAMUSCULAR; INTRAVENOUS ONCE
Status: CANCELLED
Start: 2020-02-20

## 2020-02-06 RX ORDER — ALBUTEROL SULFATE 0.83 MG/ML
2.5 SOLUTION RESPIRATORY (INHALATION)
Status: CANCELLED | OUTPATIENT
Start: 2020-02-27

## 2020-02-06 RX ORDER — CYTARABINE 100 MG/ML
75 INJECTION, SOLUTION INTRATHECAL; INTRAVENOUS; SUBCUTANEOUS ONCE
Status: CANCELLED
Start: 2020-03-19

## 2020-02-06 RX ORDER — SODIUM CHLORIDE 9 MG/ML
200 INJECTION, SOLUTION INTRAVENOUS CONTINUOUS PRN
Status: CANCELLED | OUTPATIENT
Start: 2020-02-20

## 2020-02-06 RX ORDER — DIPHENHYDRAMINE HCL 25 MG
25-50 CAPSULE ORAL EVERY 6 HOURS PRN
Status: CANCELLED
Start: 2020-02-20

## 2020-02-06 RX ORDER — DIPHENHYDRAMINE HYDROCHLORIDE 50 MG/ML
50 INJECTION INTRAMUSCULAR; INTRAVENOUS
Status: CANCELLED
Start: 2020-04-09

## 2020-02-06 RX ORDER — ALBUTEROL SULFATE 90 UG/1
1-2 AEROSOL, METERED RESPIRATORY (INHALATION)
Status: CANCELLED
Start: 2020-04-09

## 2020-02-06 RX ORDER — ALBUTEROL SULFATE 0.83 MG/ML
2.5 SOLUTION RESPIRATORY (INHALATION)
Status: CANCELLED | OUTPATIENT
Start: 2020-03-19

## 2020-02-06 RX ORDER — METHYLPREDNISOLONE SODIUM SUCCINATE 125 MG/2ML
125 INJECTION, POWDER, LYOPHILIZED, FOR SOLUTION INTRAMUSCULAR; INTRAVENOUS
Status: CANCELLED | OUTPATIENT
Start: 2020-04-02

## 2020-02-06 NOTE — LETTER
February 6, 2020    Lazaro Lund                              [address] [facility] [address]    Dear Dr. Berna Willis is recommending follow up imaging to reassess your pancreas at the end of April, 2020.    Your scan has been scheduled for:    4/27/2020   9:00 am, arrive at 8:30 am    Location:    [facility]  [facility]  [address] [place] [address] [phone]      Additional instructions: (imaging, labs tests, etc.)   Do not eat or drink for 2 hours prior to your scan.       If you need to reschedule this appointment please call image scheduling at [phone]. Call our office with any additional concerns.    Meena Samaniego RN Care Coordinator

## 2020-02-10 NOTE — TELEPHONE ENCOUNTER
Tobacco Treatment Program at the [facility] attempted to reach Mr. Lund on 2/10/2020 regarding the tobacco cessation program to help Mr. Lund to quit smoking. We will attempt to reach Mr. Lund another time.     Floresita Monreal [facility]  Tobacco Treatment Specialist  PH: [phone]

## 2020-02-13 NOTE — TELEPHONE ENCOUNTER
Tobacco Treatment Program at the [facility] attempted to reach Mr. Lund on 2/13/2020 regarding the tobacco cessation program to help Mr. Lund to quit smoking. We will attempt to reach Mr. Lund another time.     Floresita Monreal [facility]  Tobacco Treatment Specialist  PH: [phone]

## 2020-02-18 RX ORDER — SULFAMETHOXAZOLE/TRIMETHOPRIM 800-160 MG
1 TABLET ORAL
Qty: 16 TABLET | Refills: 11 | Status: ON HOLD | OUTPATIENT
Start: 2020-02-18 | End: 2020-04-23

## 2020-02-19 ENCOUNTER — ANESTHESIA EVENT (OUTPATIENT)
Dept: PEDIATRICS | Facility: CLINIC | Age: 20
End: 2020-02-19
Payer: MEDICAID

## 2020-02-19 ASSESSMENT — ENCOUNTER SYMPTOMS
APNEA: 0
SEIZURES: 0

## 2020-02-20 ENCOUNTER — OFFICE VISIT (OUTPATIENT)
Dept: PEDIATRIC HEMATOLOGY/ONCOLOGY | Facility: CLINIC | Age: 20
End: 2020-02-20
Attending: PEDIATRICS
Payer: MEDICAID

## 2020-02-20 ENCOUNTER — ANESTHESIA (OUTPATIENT)
Dept: PEDIATRICS | Facility: CLINIC | Age: 20
End: 2020-02-20
Payer: MEDICAID

## 2020-02-20 ENCOUNTER — INFUSION THERAPY VISIT (OUTPATIENT)
Dept: INFUSION THERAPY | Facility: CLINIC | Age: 20
End: 2020-02-20
Attending: PEDIATRICS
Payer: MEDICAID

## 2020-02-20 ENCOUNTER — HOSPITAL ENCOUNTER (OUTPATIENT)
Facility: CLINIC | Age: 20
Discharge: HOME | End: 2020-02-20
Attending: PEDIATRICS | Admitting: PEDIATRICS
Payer: MEDICAID

## 2020-02-20 ENCOUNTER — OFFICE VISIT (OUTPATIENT)
Dept: PEDIATRIC HEMATOLOGY/ONCOLOGY | Facility: CLINIC | Age: 20
End: 2020-02-20

## 2020-02-20 VITALS
HEIGHT: 73 IN | BODY MASS INDEX: 24.95 KG/M2 | WEIGHT: 188.27 LBS | RESPIRATION RATE: 16 BRPM | HEART RATE: 89 BPM | OXYGEN SATURATION: 99 % | DIASTOLIC BLOOD PRESSURE: 91 MMHG | TEMPERATURE: 97.8 F | SYSTOLIC BLOOD PRESSURE: 107 MMHG

## 2020-02-20 VITALS
OXYGEN SATURATION: 99 % | DIASTOLIC BLOOD PRESSURE: 82 MMHG | RESPIRATION RATE: 16 BRPM | SYSTOLIC BLOOD PRESSURE: 135 MMHG | TEMPERATURE: 98.3 F | HEART RATE: 76 BPM

## 2020-02-20 DIAGNOSIS — Z71.6 ENCOUNTER FOR SMOKING CESSATION COUNSELING: ICD-10-CM

## 2020-02-20 DIAGNOSIS — K85.31 DRUG-INDUCED ACUTE PANCREATITIS WITH UNINFECTED NECROSIS: ICD-10-CM

## 2020-02-20 DIAGNOSIS — C83.50 T LYMPHOBLASTIC LYMPHOMA (H): Primary | ICD-10-CM

## 2020-02-20 DIAGNOSIS — Z71.9 ENCOUNTER FOR COUNSELING: Primary | ICD-10-CM

## 2020-02-20 LAB
ALBUMIN SERPL-MCNC: 4 G/DL (ref 3.4–5)
ALP SERPL-CCNC: 77 U/L (ref 40–150)
ALT SERPL W P-5'-P-CCNC: 36 U/L (ref 0–70)
ANION GAP SERPL CALCULATED.3IONS-SCNC: 4 MMOL/L (ref 3–14)
AST SERPL W P-5'-P-CCNC: 11 U/L (ref 0–45)
BASOPHILS # BLD AUTO: 0 10E9/L (ref 0–0.2)
BASOPHILS NFR BLD AUTO: 0.4 %
BILIRUB SERPL-MCNC: 0.3 MG/DL (ref 0.2–1.3)
BUN SERPL-MCNC: 11 MG/DL (ref 7–30)
CALCIUM SERPL-MCNC: 8.7 MG/DL (ref 8.5–10.1)
CHLORIDE SERPL-SCNC: 107 MMOL/L (ref 94–109)
CO2 SERPL-SCNC: 29 MMOL/L (ref 20–32)
CREAT SERPL-MCNC: 0.6 MG/DL (ref 0.66–1.25)
DEPRECATED CALCIDIOL+CALCIFEROL SERPL-MC: 19 UG/L (ref 20–75)
DIFFERENTIAL METHOD BLD: ABNORMAL
EOSINOPHIL # BLD AUTO: 0.1 10E9/L (ref 0–0.7)
EOSINOPHIL NFR BLD AUTO: 2.1 %
ERYTHROCYTE [DISTWIDTH] IN BLOOD BY AUTOMATED COUNT: 12.6 % (ref 10–15)
GFR SERPL CREATININE-BSD FRML MDRD: >90 ML/MIN/{1.73_M2}
GLUCOSE SERPL-MCNC: 103 MG/DL (ref 70–99)
HCT VFR BLD AUTO: 38.5 % (ref 40–53)
HGB BLD-MCNC: 13 G/DL (ref 13.3–17.7)
IMM GRANULOCYTES # BLD: 0 10E9/L (ref 0–0.4)
IMM GRANULOCYTES NFR BLD: 0 %
LYMPHOCYTES # BLD AUTO: 1.4 10E9/L (ref 0.8–5.3)
LYMPHOCYTES NFR BLD AUTO: 28.3 %
MCH RBC QN AUTO: 31.2 PG (ref 26.5–33)
MCHC RBC AUTO-ENTMCNC: 33.8 G/DL (ref 31.5–36.5)
MCV RBC AUTO: 92 FL (ref 78–100)
MONOCYTES # BLD AUTO: 0.6 10E9/L (ref 0–1.3)
MONOCYTES NFR BLD AUTO: 12.6 %
NEUTROPHILS # BLD AUTO: 2.7 10E9/L (ref 1.6–8.3)
NEUTROPHILS NFR BLD AUTO: 56.6 %
NRBC # BLD AUTO: 0 10*3/UL
NRBC BLD AUTO-RTO: 0 /100
PLATELET # BLD AUTO: 292 10E9/L (ref 150–450)
POTASSIUM SERPL-SCNC: 3.7 MMOL/L (ref 3.4–5.3)
PROT SERPL-MCNC: 6.9 G/DL (ref 6.8–8.8)
RBC # BLD AUTO: 4.17 10E12/L (ref 4.4–5.9)
SODIUM SERPL-SCNC: 140 MMOL/L (ref 133–144)
WBC # BLD AUTO: 4.8 10E9/L (ref 4–11)

## 2020-02-20 PROCEDURE — 96411 CHEMO IV PUSH ADDL DRUG: CPT

## 2020-02-20 PROCEDURE — 25000128 H RX IP 250 OP 636: Mod: ZF | Performed by: PEDIATRICS

## 2020-02-20 PROCEDURE — 25000125 ZZHC RX 250: Performed by: PEDIATRICS

## 2020-02-20 PROCEDURE — 82306 VITAMIN D 25 HYDROXY: CPT | Performed by: PEDIATRICS

## 2020-02-20 PROCEDURE — 25000125 ZZHC RX 250

## 2020-02-20 PROCEDURE — 96450 CHEMOTHERAPY INTO CNS: CPT | Performed by: PEDIATRICS

## 2020-02-20 PROCEDURE — 37000008 ZZH ANESTHESIA TECHNICAL FEE, 1ST 30 MIN: Performed by: PEDIATRICS

## 2020-02-20 PROCEDURE — 80053 COMPREHEN METABOLIC PANEL: CPT | Performed by: PEDIATRICS

## 2020-02-20 PROCEDURE — 40001011 ZZH STATISTIC PRE-PROCEDURE NURSING ASSESSMENT: Performed by: PEDIATRICS

## 2020-02-20 PROCEDURE — 25000128 H RX IP 250 OP 636: Mod: KP | Performed by: NURSE ANESTHETIST, CERTIFIED REGISTERED

## 2020-02-20 PROCEDURE — 89050 BODY FLUID CELL COUNT: CPT | Performed by: PEDIATRICS

## 2020-02-20 PROCEDURE — 25800030 ZZH RX IP 258 OP 636: Mod: ZF | Performed by: PEDIATRICS

## 2020-02-20 PROCEDURE — 36591 DRAW BLOOD OFF VENOUS DEVICE: CPT | Performed by: PEDIATRICS

## 2020-02-20 PROCEDURE — 40000165 ZZH STATISTIC POST-PROCEDURE RECOVERY CARE: Performed by: PEDIATRICS

## 2020-02-20 PROCEDURE — 25800030 ZZH RX IP 258 OP 636: Performed by: NURSE ANESTHETIST, CERTIFIED REGISTERED

## 2020-02-20 PROCEDURE — 85025 COMPLETE CBC W/AUTO DIFF WBC: CPT | Performed by: PEDIATRICS

## 2020-02-20 PROCEDURE — 96409 CHEMO IV PUSH SNGL DRUG: CPT

## 2020-02-20 PROCEDURE — 25000128 H RX IP 250 OP 636: Mod: ZF

## 2020-02-20 PROCEDURE — 25000128 H RX IP 250 OP 636

## 2020-02-20 PROCEDURE — 25800030 ZZH RX IP 258 OP 636: Performed by: PEDIATRICS

## 2020-02-20 PROCEDURE — 96375 TX/PRO/DX INJ NEW DRUG ADDON: CPT | Mod: 59

## 2020-02-20 PROCEDURE — 25000128 H RX IP 250 OP 636: Mod: JW,KQ | Performed by: PEDIATRICS

## 2020-02-20 PROCEDURE — 96367 TX/PROPH/DG ADDL SEQ IV INF: CPT | Mod: 59 | Performed by: PEDIATRICS

## 2020-02-20 RX ORDER — ONDANSETRON 2 MG/ML
8 INJECTION INTRAMUSCULAR; INTRAVENOUS ONCE
Status: COMPLETED | OUTPATIENT
Start: 2020-02-20 | End: 2020-02-20

## 2020-02-20 RX ORDER — PANTOPRAZOLE SODIUM 40 MG/1
40 TABLET, DELAYED RELEASE ORAL
Qty: 30 TABLET | Refills: 2 | Status: ON HOLD | OUTPATIENT
Start: 2020-02-20 | End: 2020-04-23

## 2020-02-20 RX ORDER — LIDOCAINE 40 MG/G
CREAM TOPICAL
Status: COMPLETED
Start: 2020-02-20 | End: 2020-02-20

## 2020-02-20 RX ORDER — ONDANSETRON 2 MG/ML
INJECTION INTRAMUSCULAR; INTRAVENOUS
Status: COMPLETED
Start: 2020-02-20 | End: 2020-02-20

## 2020-02-20 RX ORDER — LIDOCAINE 40 MG/G
2.5 CREAM TOPICAL
Status: DISCONTINUED | OUTPATIENT
Start: 2020-02-20 | End: 2020-02-21 | Stop reason: HOSPADM

## 2020-02-20 RX ORDER — PROPOFOL 10 MG/ML
INJECTION, EMULSION INTRAVENOUS PRN
Status: DISCONTINUED | OUTPATIENT
Start: 2020-02-20 | End: 2020-02-20

## 2020-02-20 RX ORDER — FENTANYL CITRATE 50 UG/ML
INJECTION, SOLUTION INTRAMUSCULAR; INTRAVENOUS PRN
Status: DISCONTINUED | OUTPATIENT
Start: 2020-02-20 | End: 2020-02-20

## 2020-02-20 RX ORDER — DEXAMETHASONE 2 MG/1
TABLET ORAL
Qty: 147 TABLET | Refills: 0 | Status: ON HOLD | OUTPATIENT
Start: 2020-02-20 | End: 2020-03-19

## 2020-02-20 RX ORDER — LIDOCAINE 40 MG/G
2.5 CREAM TOPICAL
Status: COMPLETED | OUTPATIENT
Start: 2020-02-20 | End: 2020-02-20

## 2020-02-20 RX ORDER — SODIUM CHLORIDE, SODIUM LACTATE, POTASSIUM CHLORIDE, CALCIUM CHLORIDE 600; 310; 30; 20 MG/100ML; MG/100ML; MG/100ML; MG/100ML
INJECTION, SOLUTION INTRAVENOUS CONTINUOUS PRN
Status: DISCONTINUED | OUTPATIENT
Start: 2020-02-20 | End: 2020-02-20

## 2020-02-20 RX ORDER — HEPARIN SODIUM,PORCINE 10 UNIT/ML
5 VIAL (ML) INTRAVENOUS ONCE
Status: COMPLETED | OUTPATIENT
Start: 2020-02-20 | End: 2020-02-20

## 2020-02-20 RX ORDER — ALBUTEROL SULFATE 0.83 MG/ML
2.5 SOLUTION RESPIRATORY (INHALATION)
Status: DISCONTINUED | OUTPATIENT
Start: 2020-02-20 | End: 2020-02-21 | Stop reason: HOSPADM

## 2020-02-20 RX ORDER — HEPARIN SODIUM (PORCINE) LOCK FLUSH IV SOLN 100 UNIT/ML 100 UNIT/ML
SOLUTION INTRAVENOUS
Status: COMPLETED
Start: 2020-02-20 | End: 2020-02-20

## 2020-02-20 RX ORDER — ONDANSETRON 2 MG/ML
4 INJECTION INTRAMUSCULAR; INTRAVENOUS ONCE
Status: COMPLETED | OUTPATIENT
Start: 2020-02-20 | End: 2020-02-20

## 2020-02-20 RX ORDER — HEPARIN SODIUM,PORCINE 10 UNIT/ML
VIAL (ML) INTRAVENOUS
Status: COMPLETED
Start: 2020-02-20 | End: 2020-02-20

## 2020-02-20 RX ORDER — HEPARIN SODIUM (PORCINE) LOCK FLUSH IV SOLN 100 UNIT/ML 100 UNIT/ML
5 SOLUTION INTRAVENOUS
Status: DISCONTINUED | OUTPATIENT
Start: 2020-02-20 | End: 2020-02-20 | Stop reason: HOSPADM

## 2020-02-20 RX ORDER — PROPOFOL 10 MG/ML
INJECTION, EMULSION INTRAVENOUS CONTINUOUS PRN
Status: DISCONTINUED | OUTPATIENT
Start: 2020-02-20 | End: 2020-02-20

## 2020-02-20 RX ORDER — NICOTINE 21 MG/24HR
1 PATCH, TRANSDERMAL 24 HOURS TRANSDERMAL EVERY 24 HOURS
Qty: 42 PATCH | Refills: 0 | Status: SHIPPED | OUTPATIENT
Start: 2020-02-20 | End: 2020-04-09

## 2020-02-20 RX ADMIN — SODIUM CHLORIDE, POTASSIUM CHLORIDE, SODIUM LACTATE AND CALCIUM CHLORIDE: 600; 310; 30; 20 INJECTION, SOLUTION INTRAVENOUS at 10:49

## 2020-02-20 RX ADMIN — Medication 5 ML: at 12:10

## 2020-02-20 RX ADMIN — PROPOFOL 250 MCG/KG/MIN: 10 INJECTION, EMULSION INTRAVENOUS at 10:52

## 2020-02-20 RX ADMIN — FENTANYL CITRATE 25 MCG: 50 INJECTION, SOLUTION INTRAMUSCULAR; INTRAVENOUS at 10:55

## 2020-02-20 RX ADMIN — METHOTREXATE: 25 INJECTION INTRA-ARTERIAL; INTRAMUSCULAR; INTRATHECAL; INTRAVENOUS at 11:03

## 2020-02-20 RX ADMIN — HEPARIN 5 ML: 100 SYRINGE at 13:40

## 2020-02-20 RX ADMIN — ONDANSETRON 4 MG: 2 INJECTION INTRAMUSCULAR; INTRAVENOUS at 13:06

## 2020-02-20 RX ADMIN — PROPOFOL 40 MG: 10 INJECTION, EMULSION INTRAVENOUS at 10:59

## 2020-02-20 RX ADMIN — LIDOCAINE 2.5 G: 40 CREAM TOPICAL at 09:38

## 2020-02-20 RX ADMIN — SODIUM CHLORIDE 50 MG: 9 INJECTION, SOLUTION INTRAVENOUS at 13:27

## 2020-02-20 RX ADMIN — VINCRISTINE SULFATE 2 MG: 1 INJECTION, SOLUTION INTRAVENOUS at 13:20

## 2020-02-20 RX ADMIN — CAFFEINE AND SODIUM BENZOATE 500 MG: 125 INJECTION, SOLUTION INTRAMUSCULAR; INTRAVENOUS at 11:15

## 2020-02-20 RX ADMIN — SODIUM CHLORIDE 100 ML: 9 INJECTION, SOLUTION INTRAVENOUS at 13:13

## 2020-02-20 RX ADMIN — ONDANSETRON 4 MG: 2 INJECTION INTRAMUSCULAR; INTRAVENOUS at 09:38

## 2020-02-20 RX ADMIN — HEPARIN, PORCINE (PF) 10 UNIT/ML INTRAVENOUS SYRINGE 5 ML: at 12:10

## 2020-02-20 RX ADMIN — HEPARIN SODIUM (PORCINE) LOCK FLUSH IV SOLN 100 UNIT/ML 5 ML: 100 SOLUTION at 13:40

## 2020-02-20 RX ADMIN — PROPOFOL 100 MG: 10 INJECTION, EMULSION INTRAVENOUS at 10:52

## 2020-02-20 ASSESSMENT — MIFFLIN-ST. JEOR: SCORE: 1909

## 2020-02-20 NOTE — DISCHARGE INSTRUCTIONS
Care post Lumbar Puncture     Do not remove bandage/dressing for 24 hours -- after this time they can be removed    No bath, shower or soaking of the dressing for 24 hours    Activity as tolerated by the patient    Diet as able to tolerated    May use Tylenol as needed for pain control -- DO NOT use Ibuprofen    Can apply icepack to the site for discomfort -- no more than 10 minutes at a time    If bleeding presents apply pressure for 5 minutes    Call [phone] ask for Peds BMT/Hem/Onc fellow on call if complications arise including:    persistent bleeding    fever greater than 100.5    Pain    Lumbar punctures can cause headache. If the pain is not controlled with Tylenol (acetaminophen) please call the Peds BMT/Hem/Onc fellow on call    Home Instructions for Your Child after Sedation  Today your child received (medicine):  Propofol, Fentanyl and Zofran  Please keep this form with your health records  Your child may be more sleepy and irritable today than normal. An adult should stay with your child for the rest of the day. The medicine may make the child dizzy. Avoid activities that require balance (bike riding, skating, climbing stairs, walking).  Remember:    When your child wants to eat again, start with liquids (juice, soda pop, Popsicles). If your child feels well enough, you may try a regular diet. It is best to offer light meals for the first 24 hours.    If your child has nausea (feels sick to the stomach) or vomiting (throws up), give small amounts of clear liquids (7-Up, Sprite, apple juice or broth). Fluids are more important than food until your child is feeling better.    Wait 24 hours before giving medicine that contains alcohol. This includes liquid cold, cough and allergy medicines (Robitussin, Vicks Formula 44 for children, Benadryl, Chlor-Trimeton).    If you will leave your child with a , give the sitter a copy of these instructions.  Call your doctor if:    You have questions  about the test results.    Your child vomits (throws up) more than two times.    Your child is very fussy or irritable.    You have trouble waking your child.     If your child has trouble breathing, call [phone].  If you have any questions or concerns, please call:  Pediatric Sedation Unit [phone]  Pediatric clinic  [phone]  [facility]  [phone] (ask for the Pediatric Anesthesiologist on call)  Emergency department [phone]  [facility] toll-free number [phone] (Monday--Friday, 8 a.m. to 4:30 p.m.)  I understand these instructions. I have all of my personal belongings.

## 2020-02-20 NOTE — ANESTHESIA CARE TRANSFER NOTE
Patient: Lazaro Lund    Procedure(s):  Lumbar puncture with intrathecal Chemotherapy (CD)    Diagnosis: Lymphoma (H) [C85.90]  Diagnosis Additional Information: No value filed.    Anesthesia Type:   General     Note:  Airway :Nasal Cannula  Patient transferred to: Recovery  Handoff Report: Identifed the Patient, Identified the Reponsible Provider, Reviewed the pertinent medical history, Discussed the surgical course, Reviewed Intra-OP anesthesia mangement and issues during anesthesia, Set expectations for post-procedure period and Allowed opportunity for questions and acknowledgement of understanding      Vitals: (Last set prior to Anesthesia Care Transfer)    CRNA VITALS  2/20/2020 1037 - 2/20/2020 1110      2/20/2020             Pulse:  79    SpO2:  99 %    Resp Rate (observed): 16                Electronically Signed By: YOHAN Abarca CRNA  February 20, 2020  11:10 AM

## 2020-02-20 NOTE — LETTER
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 He is currently day 1 of Delayed Intensification.    With induction, he had a CRu, resolution of lymphadenopathy, but persistence of small pleural effusion (resolved after consolidation). Induction was complicated by development of extensive upper DVT complicated by edema and folliculitis preventing day 29 LP, and consolidation was complicated by severe PEG-asparaginase induced necrotizing pancreatitis, being monitored by Dr. Montgomery. As a result, PEG-asparaginase has been discontinued from his treatment regimen.    Plan  1. Labs today are good to start delayed intensification: IT chemo (see procedure note below), Vinc, Daunorubicin, and start 7 day course of decadron  1. Reviewed anticipatory nausea management, constipation management, and importance of hydration  2. Following with Dr. Coronel for his pancreatitis: follow up CT in April  3. Continue efforts at smoking cessation; Lazaro would like to try a nicotine patch - will start at step 2 for 6 weeks and then 6 weeks at step 3  4. Continue lovenox at current dose. He is aware of the need to hold PM prior and AM of his LPs. Will maintain platelet count > 30K while on anticoagulation.   1. Will obtain follow up upper extremity US at end of delayed intensification with goal to stop lovenox at this point  5. Continue vitamin D, repeat level in early March  6. Day 29 induction LP that was deferred will be made up during maintenance therapy  1. Given significant spinal headache history will plan for IV caffeine following LPs in the future.   7. Given pancreatitis and intermediate TPMT phenotype his 6MP dose was reduced for the 2nd half of Consolidation by  50%. TPMT and NUDT15 genotype suggestive of no reduced function.   8. RTC on 2/27/20 for day 8 of DI chemotherapy    Signed,  Nicho Fischer   Pediatric Hematology/Oncology Fellow    Physician Attestation    I saw and evaluated the patient. I discussed with the fellow and agree with the findings and plan as documented in the fellow's note.     Reynaldo Campuzano MD  Pediatric Hematology/Oncology  [facility]    2/20/2020 Therapeutic Lumbar Puncture Procedure  A Lumbar Puncture was performed in the Pediatric Sedation Suite. Informed consent was obtained prior to the procedure. Lazaro Lund was identified by facial recognition and ID arm band. A time-out was performed. Lazaro Lund was then placed in the left lateral decubitus position and the lumbosacral area was sterily prepped using Chloraprep followed by drape placement. Anatomic landmarks were identified by palpation. Then, a 22 gauge, 3.5 inch spinal needle was easily inserted into the L3-L4 interspace. On the first attempt approximately 3 mL of clear and colorless cerebrospinal fluid was obtained to be sent to the lab for cell count analysis and cytospin. Following that, 15mg of intrathecal Methotrexate in 6 mL of preservative-free normal saline was infused without resistance. The needle was removed and a Band-Aid applied. Lazaro Lund tolerated this procedure very well.  Signed,  Nicho Fischer   Pediatric Hematology/Oncology Fellow    Physician Attestation   I personally observed as the fellow performed the lumbar puncture procedure with intrathecal chemotherapy administration.     Reynaldo Campuzano      Primary Care Physician   Rodriguez Montoya    Interim History  Lazaro presents today for day 1 of delayed intensification    Lazaro is overall doing well at home. His biggest issue right now is that he feels his sleep schedule has been thrown off course. This is because he was nauseous a lot during IM chemotherapy,  sleeping during the day and then awake at night. His appetite is good and he has not had fever, chest pain, shortness of breath, headache, abdominal pain, constipation, weakness. Lazaro continues to make efforts to stop smoking, but this continues to partly be a struggle as his mom is still smoking around him. He states he is at 3 cigarettes a day at this point.     Oncology History  Lazaro Lund is a 19 year old young man with T-cell lymphoblastic lymphoma, pratt stage III. Lazaro presented with chronic cough with progressive orthopnea and was found to have an anterior mediastinal mass and malignant left-sided pleural effusion.  A CT guided biopsy was obtained at [facility] and pathology was consistent with T-cell neoplasm.  He was admitted to [facility] oncology service 8/30 and started on treatment per [id number].  He had a pleural effusion that required a chest tube and a pericardial effusion that was not drained. His Induction was complicated by cardiac compression secondary to his mass leading to tamponade, this improved through out his hospital stay. His induction course was complicated by extensive bilateral UE DVTs for which anticoagulation was started. At end of induction, he had a CRu response (persistent effusion on imaging). Consolidation was complicated by the development of severe asparaginase induced pancreatitis. Due to this, asparaginase was omitted from his treatment plan. His end of consolidation scan showed resolved pleural effusion and no evidence of disease. He tolerated interim maintenance well, and is now in delayed intensification.    Past Medical History    I have reviewed this patient's medical history and updated it with pertinent information if needed.   Past Medical History:   Diagnosis Date     Acute necrotizing pancreatitis 11/07/2019    attributed to asparaginase     Acute pancreatitis due to PEGaspariginase therapy  11/15/2019     DVT of upper extremity (deep vein thrombosis) (H)  09/26/2019    Bilateral      Edema of upper extremity 10/17/2019     Folliculitis 10/17/2019     Migraine 2006    have resolved     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       Past Surgical History   I have reviewed this patient's surgical history and updated it with pertinent information if needed.  Past Surgical History:   Procedure Laterality Date     BONE MARROW BIOPSY, BONE SPECIMEN, NEEDLE/TROCAR Left 9/1/2019    Procedure: BIOPSY, BONE MARROW;  Surgeon: Heather Lopez MD;  [facility]     INSERT PICC LINE N/A 8/31/2019    Procedure: INSERTION, PICC;  Surgeon: Michell Keith MD;  [facility]     INSERT PORT VASCULAR ACCESS N/A 10/24/2019    Procedure: INSERTION, VASCULAR ACCESS PORT;  Surgeon: Silviano Martins MD;  [facility] P[facility]      IR CHEST PORT PLACEMENT > 5 YRS OF AGE  10/24/2019     IR CHEST TUBE PLACEMENT NON-TUNNELLED LEFT  8/31/2019     IR PICC PLACEMENT > 5 YRS OF AGE  8/31/2019     IR PORT CHECK RIGHT  11/12/2019     SPINAL PUNCTURE,LUMBAR, INTRATHECAL CHEMO DELIVERY N/A 8/31/2019    Procedure: LUMBAR PUNCTURE, WITH INTRATHECAL CHEMOTHERAPY ADMINISTRATION;  Surgeon: Heather Lopez MD;  [facility]     SPINAL PUNCTURE,LUMBAR, INTRATHECAL CHEMO DELIVERY N/A 9/9/2019    Procedure: Lumbar Puncture With Intrathecal Chemo;  Surgeon: Alexi Hayes MD;  [facility]     SPINAL PUNCTURE,LUMBAR, INTRATHECAL CHEMO DELIVERY N/A 10/10/2019    Procedure: Lumbar puncture with IT Chemo (CD);  Surgeon: Reynaldo Campuzano MD;  [facility]      SPINAL PUNCTURE,LUMBAR, INTRATHECAL CHEMO DELIVERY N/A 10/17/2019    Procedure: Lumbar puncture with IT Chemo (not CD);  Surgeon: Reynaldo Campuzano MD;  [facility]      SPINAL PUNCTURE,LUMBAR, INTRATHECAL CHEMO DELIVERY N/A 10/24/2019    Procedure: LUMBAR PUNCTURE, WITH INTRATHECAL CHEMOTHERAPY ADMINISTRATION;  Surgeon: Félix Van, APRN CNP;  [facility]       SPINAL PUNCTURE,LUMBAR, INTRATHECAL CHEMO DELIVERY N/A 10/31/2019    Procedure: Lumbar puncture with IT Chemo (not CD);  Surgeon: Reynaldo Campuzano MD;  [facility]      SPINAL PUNCTURE,LUMBAR, INTRATHECAL CHEMO DELIVERY N/A 12/19/2019    Procedure: Lumbar puncture with IT Chem (CD);  Surgeon: Félix Van APRN CNP;  [facility]      SPINAL PUNCTURE,LUMBAR, INTRATHECAL CHEMO DELIVERY N/A 12/23/2019    Procedure: Lumbar puncture with IT Chem (CD);  Surgeon: Heather Lopez MD;  [facility]      SPINAL PUNCTURE,LUMBAR, INTRATHECAL CHEMO DELIVERY N/A 1/23/2020    Procedure: Lumbar puncture with IT Chem (CD);  Surgeon: Reynaldo Campuzano MD;  [facility]      THORACENTESIS N/A 8/31/2019    Procedure: Thoracentesis;  Surgeon: Michell Keith MD;  [facility]       Medications   Current Outpatient Medications on File Prior to Visit   Medication Sig Dispense Refill     diphenhydrAMINE (BENADRYL) 25 MG capsule Take 1-2 capsules (25-50 mg) by mouth every 6 hours as needed (Breakthrough Nausea and Vomiting ) 30 capsule 1     enoxaparin ANTICOAGULANT (LOVENOX) 100 MG/ML syringe Inject 1 mL (100 mg) Subcutaneous every 12 hours 180 mL 0     ondansetron (ZOFRAN-ODT) 8 MG ODT tab Take 1 tablet (8 mg) by mouth every 6 hours as needed for nausea 20 tablet 6     polyethylene glycol (MIRALAX/GLYCOLAX) packet Take 17 g by mouth daily 30 packet 0     scopolamine (TRANSDERM) 1 MG/3DAYS 72 hr patch Place 1 patch onto the skin every 72 hours 10 patch 3     sennosides (SENOKOT) 8.6 MG tablet Take 2 tablets by mouth 2 times daily as needed for constipation 60 each 1     Vitamin D, Cholecalciferol, 25 MCG (1000 UT) TABS Take 2 tablets (2,000 Units) by mouth daily 60 tablet 3     Allergies   Allergies   Allergen Reactions     Asparaginase Derivatives Other (See Comments)     Severe pancreatitis     No Known Drug Allergies        Social History   I  have updated and reviewed the following Social History Narrative:   Pediatric History   Patient Parents     JHONATHAN RODRIGUEZ (Mother)     AVNI RODRIGUEZ (Father)     Other Topics Concern     Not on file   Social History Narrative    Lives at home in [place] [employer] [place] with Dad and Step-Mom. Biological mother is involved in his life and accompanied him during this hospitalization. Has 4 step-siblings and 1 biological sibling. 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 Has completed high school.       Family History   I have reviewed this patient's family history and updated it with pertinent information if needed.   Family History   Problem Relation Age of Onset     No Known Problems Mother      No Known Problems Father      Asthma Brother      Thyroid Cancer Paternal Grandmother      Melanoma Paternal Aunt        Review of Systems   The 10 point Review of Systems is negative other than noted in the HPI or here.     Physical Exam   Vital Signs with Ranges  Temp:  [97.7  F (36.5  C)-98.5  F (36.9  C)] 97.7  F (36.5  C)  Pulse:  [69-86] 75  Heart Rate:  [72-80] 72  Resp:  [16-21] 16  BP: ()/(50-72) 97/50  SpO2:  [95 %-99 %] 95 %    General: Lazaro is alert, no distress  HEENT: Skull is atrauamatic and normocephalic.  Full head of hair, thinning. PERRLA, sclera are non icteric and not injected, EOM are intact. Nares are patent without drainage. Oropharynx clear, no plaques noted. Buccal mucosa and tongue clear. MMM.   Lymph:  Neck is supple without lymphadenopathy.  There is no supraclavicular, axillary or inguinal lymphadenopathy palpated.  Cardiovascular:  HR is normal with regular rhythm, no murmur.  Capillary refill is < 2 seconds. Right arm without edema or erythema.  No pitting edema.   Respiratory: No cough noted. Respirations are easy.  Lungs are clear to auscultation through out.  No crackles or  wheezes.  Gastrointestinal:  BS present in all quadrants.  Abdomen is soft and flat. No tenderness with palpation. No hepatosplenomegaly or mass.  Skin: No rashes, bruises or other skin lesions noted.  Neurological:  No focal deficits  Musculoskeletal:  Good strength and ROM in all extremities.  Strong dorsiflexion at ankles and great toes (5/5) bilaterally without any pain at the Achilles.    Data   Results for orders placed or performed during the hospital encounter of 02/20/20 (from the past 24 hour(s))   CBC with platelets differential   Result Value Ref Range    WBC 4.8 4.0 - 11.0 10e9/L    RBC Count 4.17 (L) 4.4 - 5.9 10e12/L    Hemoglobin 13.0 (L) 13.3 - 17.7 g/dL    Hematocrit 38.5 (L) 40.0 - 53.0 %    MCV 92 78 - 100 fl    MCH 31.2 26.5 - 33.0 pg    MCHC 33.8 31.5 - 36.5 g/dL    RDW 12.6 10.0 - 15.0 %    Platelet Count 292 150 - 450 10e9/L    Diff Method Automated Method     % Neutrophils 56.6 %    % Lymphocytes 28.3 %    % Monocytes 12.6 %    % Eosinophils 2.1 %    % Basophils 0.4 %    % Immature Granulocytes 0.0 %    Nucleated RBCs 0 0 /100    Absolute Neutrophil 2.7 1.6 - 8.3 10e9/L    Absolute Lymphocytes 1.4 0.8 - 5.3 10e9/L    Absolute Monocytes 0.6 0.0 - 1.3 10e9/L    Absolute Eosinophils 0.1 0.0 - 0.7 10e9/L    Absolute Basophils 0.0 0.0 - 0.2 10e9/L    Abs Immature Granulocytes 0.0 0 - 0.4 10e9/L    Absolute Nucleated RBC 0.0    Comprehensive metabolic panel   Result Value Ref Range    Sodium 140 133 - 144 mmol/L    Potassium 3.7 3.4 - 5.3 mmol/L    Chloride 107 94 - 109 mmol/L    Carbon Dioxide 29 20 - 32 mmol/L    Anion Gap 4 3 - 14 mmol/L    Glucose 103 (H) 70 - 99 mg/dL    Urea Nitrogen 11 7 - 30 mg/dL    Creatinine 0.60 (L) 0.66 - 1.25 mg/dL    GFR Estimate >90 >60 mL/min/[1.73_m2]    GFR Estimate If Black >90 >60 mL/min/[1.73_m2]    Calcium 8.7 8.5 - 10.1 mg/dL    Bilirubin Total 0.3 0.2 - 1.3 mg/dL    Albumin 4.0 3.4 - 5.0 g/dL    Protein Total 6.9 6.8 - 8.8 g/dL    Alkaline Phosphatase 77  40 - 150 U/L    ALT 36 0 - 70 U/L    AST 11 0 - 45 U/L       Nicho Fischer MD

## 2020-02-20 NOTE — ANESTHESIA PREPROCEDURE EVALUATION
Anesthesia Pre-Procedure Evaluation    Patient: Lazaro Lund   MRN:     [id number] Gender:   male   Age:    19 year old :      2000        Preoperative Diagnosis: Lymphoma (H) [C85.90]   Procedure(s):  Lumbar puncture with intrathecal Chemotherapy (CD)     LABS:  CBC:   Lab Results   Component Value Date    WBC 4.8 2020    WBC 3.5 (L) 2020    HGB 13.0 (L) 2020    HGB 11.7 (L) 2020    HCT 38.5 (L) 2020    HCT 35.2 (L) 2020     2020     2020     BMP:   Lab Results   Component Value Date     2020     2020    POTASSIUM 3.7 2020    POTASSIUM 3.8 2020    CHLORIDE 107 2020    CHLORIDE 109 2020    CO2 29 2020    CO2 26 2020    BUN 11 2020    BUN 12 2020    CR 0.60 (L) 2020    CR 0.67 2020     (H) 2020    GLC 97 2020     COAGS:   Lab Results   Component Value Date    PTT 45 (H) 2019    INR 1.09 2019    FIBR 293 2019     POC:   Lab Results   Component Value Date    BGM 87 2019     OTHER:   Lab Results   Component Value Date    LACT 0.4 (L) 2019    MICHAEL 8.7 2020    PHOS 4.2 2019    MAG 2.1 2019    ALBUMIN 4.0 2020    PROTTOTAL 6.9 2020    ALT 36 2020    AST 11 2020    ALKPHOS 77 2020    BILITOTAL 0.3 2020    LIPASE 1,239 (H) 2019    AMYLASE 246 (H) 2019    .0 (H) 2019        Preop Vitals    BP Readings from Last 3 Encounters:   20 124/72   20 126/77   20 130/87    Pulse Readings from Last 3 Encounters:   20 86   20 91   20 78      Resp Readings from Last 3 Encounters:   20 16   20 16   20 18    SpO2 Readings from Last 3 Encounters:   20 98%   20 98%   20 98%      Temp Readings from Last 1 Encounters:   20 36.9  C (98.5  F) (Oral)    Ht Readings from Last 1 Encounters:  "  02/20/20 1.848 m (6' 0.76\")      Wt Readings from Last 1 Encounters:   02/20/20 85.4 kg (188 lb 4.4 oz)    Estimated body mass index is 25.01 kg/m  as calculated from the following:    Height as of this encounter: 1.848 m (6' 0.76\").    Weight as of this encounter: 85.4 kg (188 lb 4.4 oz).     LDA:  Port A Cath Single 10/24/19 Right Chest wall (Active)   Access Date 02/20/20 2/20/2020  9:00 AM   Access Attempts 1 2/20/2020  9:00 AM   Gauge Power noncoring 90 degree bend;20 gauge;3/4 inch 2/20/2020  9:00 AM   Site Assessment WDL 2/20/2020  9:00 AM   Line Status Blood return noted;Saline locked 2/20/2020  9:00 AM   Extravasation? No 2/20/2020  9:00 AM   Dressing Intervention Transparent 2/20/2020  9:00 AM   Dressing change due 02/27/20 2/20/2020  9:00 AM   Needle Change Due 02/27/20 2/20/2020  9:00 AM   Line Necessity Yes, meets criteria 2/20/2020  9:00 AM   De-Access Date 01/23/20 1/23/2020  3:31 PM   Date to be Reflushed 02/20/20 1/23/2020  3:31 PM   Number of days: 119        Past Medical History:   Diagnosis Date     Acute necrotizing pancreatitis 11/07/2019    attributed to asparaginase     Acute pancreatitis due to PEGaspariginase therapy  11/15/2019     DVT of upper extremity (deep vein thrombosis) (H) 09/26/2019    Bilateral      Edema of upper extremity 10/17/2019     Folliculitis 10/17/2019     Migraine 2006    have resolved     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      Past Surgical History:   Procedure Laterality Date     BONE MARROW BIOPSY, BONE SPECIMEN, NEEDLE/TROCAR Left 9/1/2019    Procedure: BIOPSY, BONE MARROW;  Surgeon: Heather Lopez MD;  [facility]     INSERT PICC LINE N/A 8/31/2019    Procedure: INSERTION, PICC;  Surgeon: Michell Keith MD;  [facility]     INSERT PORT VASCULAR ACCESS N/A 10/24/2019    Procedure: INSERTION, VASCULAR ACCESS PORT;  Surgeon: Silviano Martins MD;  [facility]      IR CHEST PORT PLACEMENT > 5 YRS OF AGE  10/24/2019     IR CHEST " TUBE PLACEMENT NON-TUNNELLED LEFT  8/31/2019     IR PICC PLACEMENT > 5 YRS OF AGE  8/31/2019     IR PORT CHECK RIGHT  11/12/2019     SPINAL PUNCTURE,LUMBAR, INTRATHECAL CHEMO DELIVERY N/A 8/31/2019    Procedure: LUMBAR PUNCTURE, WITH INTRATHECAL CHEMOTHERAPY ADMINISTRATION;  Surgeon: Heather Lopez MD;  [facility]     SPINAL PUNCTURE,LUMBAR, INTRATHECAL CHEMO DELIVERY N/A 9/9/2019    Procedure: Lumbar Puncture With Intrathecal Chemo;  Surgeon: Alexi Hayes MD;  [facility]     SPINAL PUNCTURE,LUMBAR, INTRATHECAL CHEMO DELIVERY N/A 10/10/2019    Procedure: Lumbar puncture with IT Chemo (CD);  Surgeon: Reynaldo Campuzano MD;  [facility]      SPINAL PUNCTURE,LUMBAR, INTRATHECAL CHEMO DELIVERY N/A 10/17/2019    Procedure: Lumbar puncture with IT Chemo (not CD);  Surgeon: Reynaldo Campuzano MD;  [facility]      SPINAL PUNCTURE,LUMBAR, INTRATHECAL CHEMO DELIVERY N/A 10/24/2019    Procedure: LUMBAR PUNCTURE, WITH INTRATHECAL CHEMOTHERAPY ADMINISTRATION;  Surgeon: Félix Van APRN CNP;  [facility]      SPINAL PUNCTURE,LUMBAR, INTRATHECAL CHEMO DELIVERY N/A 10/31/2019    Procedure: Lumbar puncture with IT Chemo (not CD);  Surgeon: Reynaldo Campuzano MD;  [facility]      SPINAL PUNCTURE,LUMBAR, INTRATHECAL CHEMO DELIVERY N/A 12/19/2019    Procedure: Lumbar puncture with IT Chem (CD);  Surgeon: Félix Van APRN CNP;  [facility]      SPINAL PUNCTURE,LUMBAR, INTRATHECAL CHEMO DELIVERY N/A 12/23/2019    Procedure: Lumbar puncture with IT Chem (CD);  Surgeon: Heather Lopez MD;  [facility]      SPINAL PUNCTURE,LUMBAR, INTRATHECAL CHEMO DELIVERY N/A 1/23/2020    Procedure: Lumbar puncture with IT Chem (CD);  Surgeon: Reynaldo Campuzano MD;  [facility]      THORACENTESIS N/A 8/31/2019    Procedure: Thoracentesis;  Surgeon: Michell Keith MD;   [facility]      Allergies   Allergen Reactions     Asparaginase Derivatives Other (See Comments)     Severe pancreatitis     No Known Drug Allergies         Anesthesia Evaluation    ROS/Med Hx    No history of anesthetic complications    Cardiovascular Findings - negative ROS  Comments:   TTE 02/04/2020: Normal echocardiogram. Normal appearance and motion of the tricuspid, mitral, pulmonary and aortic valves. No atrial, ventricular or arterial level shunting. LV and RV have normal chamber size, wall thickness, and systolic function. LVEF 67 %.    Neuro Findings - negative ROS  (-) seizures      Pulmonary Findings   (-) asthma and apnea (indicates he has been told that he snores)    HENT Findings - negative HENT ROS    Skin Findings - negative skin ROS      GI/Hepatic/Renal Findings   (-) GERD    Endocrine/Metabolic Findings - negative ROS      Genetic/Syndrome Findings - negative genetics/syndromes ROS    Hematology/Oncology Findings   (+) cancer (T lymphoblastic lymphoma)            PHYSICAL EXAM:   Mental Status/Neuro: A/A/O   Airway: Facies: Feasible  Mallampati: II  Mouth/Opening: Full  TM distance: > 6 cm  Neck ROM: Full   Respiratory: Auscultation: CTAB     Resp. Rate: Normal     Resp. Effort: Normal      CV: Rhythm: Regular  Rate: Age appropriate  Heart: Normal Sounds  Edema: None   Comments:      Dental: Normal Dentition                Assessment:   ASA SCORE: 3    H&P: History and physical reviewed and following examination; no interval change.     Smoking Status:  Active Smoker       - patient did not smoke on day of surgery       - instructed to abstain from smoking on day of procedure   NPO Status: NPO Appropriate     Plan:   Anes. Type:  General   Pre-Medication: None   Induction:  IV (Standard)   Airway: Native Airway   Access/Monitoring: PIV; Central Access/Port present   Maintenance: Propofol Sedation     Postop Plan:   Postop Pain: None  Postop Sedation/Airway: Not planned  Disposition:  Outpatient     PONV Management:    Prevention: Ondansetron, Propofol     CONSENT: Direct conversation   Plan and risks discussed with: Patient   Blood Products: Consent Deferred (Minimal Blood Loss)       Comments for Plan/Consent:  Discussed common and potentially harmful risks for General Anesthesia, Native Airway.   These risks include, but were not limited to: Conversion to secured airway, Sore throat, Airway injury, Dental injury, Aspiration, Respiratory issues (Bronchospasm, Laryngospasm, Desaturation), Hemodynamic issues (Arrhythmia, Hypotension, Ischemia), Potential long term consequences of respiratory and hemodynamic issues, PONV, Emergence delirium  Risks of invasive procedures were not discussed: N/A    All questions were answered.           Kyle Palomino MD

## 2020-02-20 NOTE — ANESTHESIA POSTPROCEDURE EVALUATION
Anesthesia POST Procedure Evaluation    Patient: Lazaro Lund   MRN:     [id number] Gender:   male   Age:    19 year old :      2000        Preoperative Diagnosis: Lymphoma (H) [C85.90]   Procedure(s):  Lumbar puncture with intrathecal Chemotherapy (CD)   Postop Comments: No value filed.     Anesthesia Type: General       Disposition: Outpatient   Postop Pain Control: Uneventful            Sign Out: Well controlled pain   PONV: No   Neuro/Psych: Uneventful            Sign Out: Acceptable/Baseline neuro status   Airway/Respiratory: Uneventful            Sign Out: Acceptable/Baseline resp. status   CV/Hemodynamics: Uneventful            Sign Out: Acceptable CV status   Other NRE: NONE   DID A NON-ROUTINE EVENT OCCUR? No         Last Anesthesia Record Vitals:  CRNA VITALS  2020 1037 - 2020 1137      2020             NIBP:  104/50    Pulse:  79    NIBP Mean:  69    Temp:  36.7  C (98.1  F)    SpO2:  99 %    Resp Rate (observed):  16    EKG:  NSR          Last PACU Vitals:  Vitals Value Taken Time   /51 2020 11:12 AM   Temp 36.5  C (97.7  F) 2020 11:12 AM   Pulse 69 2020 11:12 AM   Resp 19 2020 11:12 AM   SpO2 99 % 2020 11:12 AM   Temp src     NIBP 104/50 2020 11:09 AM   Pulse 79 2020 11:09 AM   SpO2 99 % 2020 11:09 AM   Resp     Temp 36.7  C (98.1  F) 2020 11:09 AM   Ht Rate     Temp 2           Electronically Signed By: Kyle Palomino MD, 2020, 11:59 AM

## 2020-02-20 NOTE — ANESTHESIA POSTPROCEDURE EVALUATION
Anesthesia POST Procedure Evaluation    Patient: Lazaro Lund   MRN:     [id number] Gender:   male   Age:    19 year old :      2000        Preoperative Diagnosis: Lymphoma (H) [C85.90]   Procedure(s):  Lumbar puncture with intrathecal Chemotherapy (CD)   Postop Comments: No value filed.     Anesthesia Type: Standby          Postop Pain Control: Uneventful            Sign Out: Well controlled pain   PONV: No   Neuro/Psych: Uneventful            Sign Out: Acceptable/Baseline neuro status   Airway/Respiratory: Uneventful            Sign Out: Acceptable/Baseline resp. status   CV/Hemodynamics: Uneventful            Sign Out: Acceptable CV status   Other NRE: NONE   DID A NON-ROUTINE EVENT OCCUR? No    Event details/Postop Comments:  - Patient had multiple Spinraza injections in the past at [place] with gas and no IV, however this was prior to the spinal fusion. They came here with hopes to continue with the same regimen. However, given the location of the procedure (small CT room) and the patient underlying disease including difficult IV access and BIPAP dependency at night time, it seemed more pruent to move away from a general anetshetic  - Patient received Midazolam per G-Tube (total 25 mg -> 15 mg + 10 mg) and tolerated procedure well with minimal sedation with home BIPAP in place, slightly oversedated per parents at the end. Monitoring was done by CT staff.  - Discussed with parents to use 20 mg Midazolam per G-tube with 30 minutes time kick in, then do procedure with minimal sedation with parent in room in small CT room I the future. This plan was also communicated with Dr. Diehl from IR and peds sedation.         Last Anesthesia Record Vitals:  CRNA VITALS  2020 1037 - 2020 1122      2020             Pulse:  79    SpO2:  99 %    Resp Rate (observed):  (!) 1          Last PACU Vitals:  Vitals Value Taken Time   /51 2020 11:12 AM   Temp 36.5  C (97.7  F) 2020 11:12 AM    Pulse 69 2/20/2020 11:12 AM   Resp 19 2/20/2020 11:12 AM   SpO2 99 % 2/20/2020 11:12 AM   Temp src     NIBP     Pulse     SpO2     Resp     Temp     Ht Rate     Temp 2           Electronically Signed By: Kyle Palomino MD, February 20, 2020, 11:22 AM

## 2020-02-20 NOTE — PROGRESS NOTES
Infusion Nursing Note    Lazaro Lund Presents to [facility] today for: C1D1 VCR/Doxo    Due to : T lymphoblastic lymphoma (H)    Intravenous Access/Labs: Port    Coping:   Child Family Life declined    Infusion Note: Pt seen in Peds Sedation prior to [facility] Appt. Port accessed/Labs drawn in Peds Sedation; parameters met for treatment per Dr. Fischer. Upon arrival to [facility]; pt reported nausea/vomiting. Administered 4mg Zofran via slow push over 5 min. VCR and Doxo given to gravity without complication; blood return noted pre/mid/post infusions. Port heparin locked and de-accessed. VSS.    Discharge Plan:   Pt received take home medications. Pt left [facility] in stable condition at end of cares.

## 2020-02-20 NOTE — LETTER
2/20/2020      RE: Lazaro Lund  [address] [facility] [address]       [facility]  PEDIATRIC HEMATOLOGY/ONCOLOGY   SOCIAL WORK PROGRESS NOTE      DATA:     Lazaro is a 19 year old male with T-Cell Lymphoblastic Lymphoma being treated per [facility] protocol [id number]. He presents to peds sedation on this date for Day 1 of Delayed Intensification. HONEY met supportively with Lazaro in Peds Sedation to check-in. Lazaro reports that overall he is doing well. He is still living with his Mom in [place]. He receives Social Security and is enrolled in Medical Assistance. He spends a lot of his days tami with friends on the computer. He enjoys spending time with friends, when they're not busy with work or school. He denied any concerns related to his mood. He is thinking of trying to get a part time job once he is finished with his delayed intensification phase of therapy. He is concerned that he might lose his Medical Assistance or Social Security. HONEY has sent him income guidelines for both programs and noted he will need to be in direct contact with them to ask further questions. 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 He was thinking 8-14 hours a week. He is still very much thinking about this presently. He denied any immediate needs/concerns at time of our visit. Grandma will be picking him up post procedure, later this afternoon.     INTERVENTION:     1. Supportive counseling. Check-in.   2. Contact information for Count includes the [facility] and social security should he have questions about working and income guidelines.   3. Updated [place] pass.     ASSESSMENT:     Lazaro appears to be doing well. His mood is stable, affect within normal limits. No concerns surrounding mental health. He acknowledged being bored at home from time to time but finds it easy to distract himself with tami or socializing with friends. He feels well supported by family and friends. He is open  to and appreciative of ongoing therapeutic support, advocacy, and connection with resources.     PLAN:     Social work will continue to assess needs and provide ongoing psychosocial support and access to resources.      CHEY Davis, LICHONEY, OSW-C  Clinical    Pediatric Hematology Oncology   [facility]   Monday-Thursday   Phone: [phone]  Pager: [phone]    NO LETTER                CHEY Alcantara

## 2020-02-20 NOTE — PROGRESS NOTES
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 He is currently day 1 of Delayed Intensification.    With induction, he had a CRu, resolution of lymphadenopathy, but persistence of small pleural effusion (resolved after consolidation). Induction was complicated by development of extensive upper DVT complicated by edema and folliculitis preventing day 29 LP, and consolidation was complicated by severe PEG-asparaginase induced necrotizing pancreatitis, being monitored by Dr. Montgomery. As a result, PEG-asparaginase has been discontinued from his treatment regimen.    Plan  1. Labs today are good to start delayed intensification: IT chemo (see procedure note below), Vinc, Daunorubicin, and start 7 day course of decadron  1. Reviewed anticipatory nausea management, constipation management, and importance of hydration  2. Following with Dr. Coronel for his pancreatitis: follow up CT in April  3. Continue efforts at smoking cessation; Lazaro would like to try a nicotine patch - will start at step 2 for 6 weeks and then 6 weeks at step 3  4. Continue lovenox at current dose. He is aware of the need to hold PM prior and AM of his LPs. Will maintain platelet count > 30K while on anticoagulation.   1. Will obtain follow up upper extremity US at end of delayed intensification with goal to stop lovenox at this point  5. Continue vitamin D, repeat level in early March  6. Day 29 induction LP that was deferred will be made up during maintenance therapy  1. Given significant spinal headache history will plan for IV caffeine following LPs in the future.   7. Given pancreatitis and intermediate TPMT phenotype his 6MP dose was reduced for the 2nd half of Consolidation by 50%. TPMT and NUDT15 genotype suggestive of no reduced function.   8. RTC on 2/27/20  for day 8 of DI chemotherapy    Signed,  Nicho Fischer   Pediatric Hematology/Oncology Fellow    Physician Attestation    I saw and evaluated the patient. I discussed with the fellow and agree with the findings and plan as documented in the fellow's note.     Reynaldo Campuzano MD  Pediatric Hematology/Oncology  [facility]    2/20/2020 Therapeutic Lumbar Puncture Procedure  A Lumbar Puncture was performed in the Pediatric Sedation Suite. Informed consent was obtained prior to the procedure. Lazaro Lund was identified by facial recognition and ID arm band. A time-out was performed. Lazaro Lund was then placed in the left lateral decubitus position and the lumbosacral area was sterily prepped using Chloraprep followed by drape placement. Anatomic landmarks were identified by palpation. Then, a 22 gauge, 3.5 inch spinal needle was easily inserted into the L3-L4 interspace. On the first attempt approximately 3 mL of clear and colorless cerebrospinal fluid was obtained to be sent to the lab for cell count analysis and cytospin. Following that, 15mg of intrathecal Methotrexate in 6 mL of preservative-free normal saline was infused without resistance. The needle was removed and a Band-Aid applied. Lazaro Lund tolerated this procedure very well.  Signed,  Nicho Fischer   Pediatric Hematology/Oncology Fellow    Physician Attestation   I personally observed as the fellow performed the lumbar puncture procedure with intrathecal chemotherapy administration.     Reynaldo Campuzano      Primary Care Physician   [facility]    Interim History  Lazaro presents today for day 1 of delayed intensification    Lazaro is overall doing well at home. His biggest issue right now is that he feels his sleep schedule has been thrown off course. This is because he was nauseous a lot during IM chemotherapy, sleeping during the day and then awake at night. His appetite is good and he has not  had fever, chest pain, shortness of breath, headache, abdominal pain, constipation, weakness. Lazaro continues to make efforts to stop smoking, but this continues to partly be a struggle as his mom is still smoking around him. He states he is at 3 cigarettes a day at this point.     Oncology History  Lazaro Lund is a 19 year old young man with T-cell lymphoblastic lymphoma, pratt stage III. Lazaro presented with chronic cough with progressive orthopnea and was found to have an anterior mediastinal mass and malignant left-sided pleural effusion.  A CT guided biopsy was obtained at [facility] and pathology was consistent with T-cell neoplasm.  He was admitted to [facility] oncology service 8/30 and started on treatment per [id number].  He had a pleural effusion that required a chest tube and a pericardial effusion that was not drained. His Induction was complicated by cardiac compression secondary to his mass leading to tamponade, this improved through out his hospital stay. His induction course was complicated by extensive bilateral UE DVTs for which anticoagulation was started. At end of induction, he had a CRu response (persistent effusion on imaging). Consolidation was complicated by the development of severe asparaginase induced pancreatitis. Due to this, asparaginase was omitted from his treatment plan. His end of consolidation scan showed resolved pleural effusion and no evidence of disease. He tolerated interim maintenance well, and is now in delayed intensification.    Past Medical History    I have reviewed this patient's medical history and updated it with pertinent information if needed.   Past Medical History:   Diagnosis Date     Acute necrotizing pancreatitis 11/07/2019    attributed to asparaginase     Acute pancreatitis due to PEGaspariginase therapy  11/15/2019     DVT of upper extremity (deep vein thrombosis) (H) 09/26/2019    Bilateral      Edema of upper extremity 10/17/2019     Folliculitis  10/17/2019     Migraine 2006    have resolved     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       Past Surgical History   I have reviewed this patient's surgical history and updated it with pertinent information if needed.  Past Surgical History:   Procedure Laterality Date     BONE MARROW BIOPSY, BONE SPECIMEN, NEEDLE/TROCAR Left 9/1/2019    Procedure: BIOPSY, BONE MARROW;  Surgeon: Heather Lopez MD;  Location: [facility] OR     INSERT PICC LINE N/A 8/31/2019    Procedure: INSERTION, PICC;  Surgeon: Michell Keith MD;  [facility] OR     INSERT PORT VASCULAR ACCESS N/A 10/24/2019    Procedure: INSERTION, VASCULAR ACCESS PORT;  Surgeon: Silviano Martins MD;  Location: [facility] PEDS SEDATION      IR CHEST PORT PLACEMENT > 5 YRS OF AGE  10/24/2019     IR CHEST TUBE PLACEMENT NON-TUNNELLED LEFT  8/31/2019     IR PICC PLACEMENT > 5 YRS OF AGE  8/31/2019     IR PORT CHECK RIGHT  11/12/2019     SPINAL PUNCTURE,LUMBAR, INTRATHECAL CHEMO DELIVERY N/A 8/31/2019    Procedure: LUMBAR PUNCTURE, WITH INTRATHECAL CHEMOTHERAPY ADMINISTRATION;  Surgeon: Heather Lopez MD;  [facility] OR     SPINAL PUNCTURE,LUMBAR, INTRATHECAL CHEMO DELIVERY N/A 9/9/2019    Procedure: Lumbar Puncture With Intrathecal Chemo;  Surgeon: Alexi Hayes MD;  [facility]     SPINAL PUNCTURE,LUMBAR, INTRATHECAL CHEMO DELIVERY N/A 10/10/2019    Procedure: Lumbar puncture with IT Chemo (CD);  Surgeon: Reynaldo Campuzano MD;  [facility] P[facility] SED[facility]      SPINAL PUNCTURE,LUMBAR, INTRATHECAL CHEMO DELIVERY N/A 10/17/2019    Procedure: Lumbar puncture with IT Chemo (not CD);  Surgeon: Reynaldo Campuzano MD;  [facility]      SPINAL PUNCTURE,LUMBAR, INTRATHECAL CHEMO DELIVERY N/A 10/24/2019    Procedure: LUMBAR PUNCTURE, WITH INTRATHECAL CHEMOTHERAPY ADMINISTRATION;  Surgeon: Félix Van, APRN CNP;  [facility]      SPINAL PUNCTURE,LUMBAR, INTRATHECAL CHEMO DELIVERY N/A 10/31/2019     Procedure: Lumbar puncture with IT Chemo (not CD);  Surgeon: Reynaldo Campuzano MD;  [facility]      SPINAL PUNCTURE,LUMBAR, INTRATHECAL CHEMO DELIVERY N/A 12/19/2019    Procedure: Lumbar puncture with IT Chem (CD);  Surgeon: Félix Van, YOHAN CNP;  [facility]      SPINAL PUNCTURE,LUMBAR, INTRATHECAL CHEMO DELIVERY N/A 12/23/2019    Procedure: Lumbar puncture with IT Chem (CD);  Surgeon: Heather Lopez MD;  [facility]      SPINAL PUNCTURE,LUMBAR, INTRATHECAL CHEMO DELIVERY N/A 1/23/2020    Procedure: Lumbar puncture with IT Chem (CD);  Surgeon: Reynaldo Campuzano MD;  [facility] P[facility] SED[facility]      THORACENTESIS N/A 8/31/2019    Procedure: Thoracentesis;  Surgeon: Michell Keith MD;  [facility]       Medications   Current Outpatient Medications on File Prior to Visit   Medication Sig Dispense Refill     diphenhydrAMINE (BENADRYL) 25 MG capsule Take 1-2 capsules (25-50 mg) by mouth every 6 hours as needed (Breakthrough Nausea and Vomiting ) 30 capsule 1     enoxaparin ANTICOAGULANT (LOVENOX) 100 MG/ML syringe Inject 1 mL (100 mg) Subcutaneous every 12 hours 180 mL 0     ondansetron (ZOFRAN-ODT) 8 MG ODT tab Take 1 tablet (8 mg) by mouth every 6 hours as needed for nausea 20 tablet 6     polyethylene glycol (MIRALAX/GLYCOLAX) packet Take 17 g by mouth daily 30 packet 0     scopolamine (TRANSDERM) 1 MG/3DAYS 72 hr patch Place 1 patch onto the skin every 72 hours 10 patch 3     sennosides (SENOKOT) 8.6 MG tablet Take 2 tablets by mouth 2 times daily as needed for constipation 60 each 1     Vitamin D, Cholecalciferol, 25 MCG (1000 UT) TABS Take 2 tablets (2,000 Units) by mouth daily 60 tablet 3     Allergies   Allergies   Allergen Reactions     Asparaginase Derivatives Other (See Comments)     Severe pancreatitis     No Known Drug Allergies        Social History   I have updated and reviewed the following Social History Narrative:   Pediatric  History   Patient Parents     JHONATHAN RODRIGUEZ (Mother)     AVNI RODRIGUEZ (Father)     Other Topics Concern     Not on file   Social History Narrative    Lives at home in [place] with Dad and Step-Mom. Biological mother is involved in his life and accompanied him during this hospitalization. Has 4 step-siblings and 1 biological sibling. 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 Has completed high school.       Family History   I have reviewed this patient's family history and updated it with pertinent information if needed.   Family History   Problem Relation Age of Onset     No Known Problems Mother      No Known Problems Father      Asthma Brother      Thyroid Cancer Paternal Grandmother      Melanoma Paternal Aunt        Review of Systems   The 10 point Review of Systems is negative other than noted in the HPI or here.     Physical Exam   Vital Signs with Ranges  Temp:  [97.7  F (36.5  C)-98.5  F (36.9  C)] 97.7  F (36.5  C)  Pulse:  [69-86] 75  Heart Rate:  [72-80] 72  Resp:  [16-21] 16  BP: ()/(50-72) 97/50  SpO2:  [95 %-99 %] 95 %    General: Lazaro is alert, no distress  HEENT: Skull is atrauamatic and normocephalic.  Full head of hair, thinning. PERRLA, sclera are non icteric and not injected, EOM are intact. Nares are patent without drainage. Oropharynx clear, no plaques noted. Buccal mucosa and tongue clear. MMM.   Lymph:  Neck is supple without lymphadenopathy.  There is no supraclavicular, axillary or inguinal lymphadenopathy palpated.  Cardiovascular:  HR is normal with regular rhythm, no murmur.  Capillary refill is < 2 seconds. Right arm without edema or erythema.  No pitting edema.   Respiratory: No cough noted. Respirations are easy.  Lungs are clear to auscultation through out.  No crackles or wheezes.  Gastrointestinal:  BS present in all quadrants.  Abdomen is soft and flat. No  tenderness with palpation. No hepatosplenomegaly or mass.  Skin: No rashes, bruises or other skin lesions noted.  Neurological:  No focal deficits  Musculoskeletal:  Good strength and ROM in all extremities.  Strong dorsiflexion at ankles and great toes (5/5) bilaterally without any pain at the Achilles.    Data   Results for orders placed or performed during the hospital encounter of 02/20/20 (from the past 24 hour(s))   CBC with platelets differential   Result Value Ref Range    WBC 4.8 4.0 - 11.0 10e9/L    RBC Count 4.17 (L) 4.4 - 5.9 10e12/L    Hemoglobin 13.0 (L) 13.3 - 17.7 g/dL    Hematocrit 38.5 (L) 40.0 - 53.0 %    MCV 92 78 - 100 fl    MCH 31.2 26.5 - 33.0 pg    MCHC 33.8 31.5 - 36.5 g/dL    RDW 12.6 10.0 - 15.0 %    Platelet Count 292 150 - 450 10e9/L    Diff Method Automated Method     % Neutrophils 56.6 %    % Lymphocytes 28.3 %    % Monocytes 12.6 %    % Eosinophils 2.1 %    % Basophils 0.4 %    % Immature Granulocytes 0.0 %    Nucleated RBCs 0 0 /100    Absolute Neutrophil 2.7 1.6 - 8.3 10e9/L    Absolute Lymphocytes 1.4 0.8 - 5.3 10e9/L    Absolute Monocytes 0.6 0.0 - 1.3 10e9/L    Absolute Eosinophils 0.1 0.0 - 0.7 10e9/L    Absolute Basophils 0.0 0.0 - 0.2 10e9/L    Abs Immature Granulocytes 0.0 0 - 0.4 10e9/L    Absolute Nucleated RBC 0.0    Comprehensive metabolic panel   Result Value Ref Range    Sodium 140 133 - 144 mmol/L    Potassium 3.7 3.4 - 5.3 mmol/L    Chloride 107 94 - 109 mmol/L    Carbon Dioxide 29 20 - 32 mmol/L    Anion Gap 4 3 - 14 mmol/L    Glucose 103 (H) 70 - 99 mg/dL    Urea Nitrogen 11 7 - 30 mg/dL    Creatinine 0.60 (L) 0.66 - 1.25 mg/dL    GFR Estimate >90 >60 mL/min/[1.73_m2]    GFR Estimate If Black >90 >60 mL/min/[1.73_m2]    Calcium 8.7 8.5 - 10.1 mg/dL    Bilirubin Total 0.3 0.2 - 1.3 mg/dL    Albumin 4.0 3.4 - 5.0 g/dL    Protein Total 6.9 6.8 - 8.8 g/dL    Alkaline Phosphatase 77 40 - 150 U/L    ALT 36 0 - 70 U/L    AST 11 0 - 45 U/L

## 2020-02-21 ENCOUNTER — HOSPITAL ENCOUNTER (EMERGENCY)
Facility: CLINIC | Age: 20
Discharge: HOME | End: 2020-02-21
Attending: PEDIATRICS | Admitting: PEDIATRICS
Payer: MEDICAID

## 2020-02-21 ENCOUNTER — TELEPHONE (OUTPATIENT)
Dept: INFUSION THERAPY | Facility: CLINIC | Age: 20
End: 2020-02-21

## 2020-02-21 VITALS
WEIGHT: 184.3 LBS | HEART RATE: 70 BPM | DIASTOLIC BLOOD PRESSURE: 80 MMHG | RESPIRATION RATE: 16 BRPM | SYSTOLIC BLOOD PRESSURE: 140 MMHG | BODY MASS INDEX: 24.48 KG/M2 | OXYGEN SATURATION: 100 % | TEMPERATURE: 98.5 F

## 2020-02-21 DIAGNOSIS — C95.00 ACUTE LEUKEMIA NOT HAVING ACHIEVED REMISSION (H): ICD-10-CM

## 2020-02-21 DIAGNOSIS — E86.0 DEHYDRATION: ICD-10-CM

## 2020-02-21 DIAGNOSIS — C83.50 T LYMPHOBLASTIC LYMPHOMA (H): ICD-10-CM

## 2020-02-21 DIAGNOSIS — R11.2 INTRACTABLE VOMITING WITH NAUSEA, UNSPECIFIED VOMITING TYPE: ICD-10-CM

## 2020-02-21 DIAGNOSIS — R11.2 NAUSEA AND VOMITING IN PEDIATRIC PATIENT: ICD-10-CM

## 2020-02-21 LAB
ALBUMIN SERPL-MCNC: 3.4 G/DL (ref 3.4–5)
ALP SERPL-CCNC: 52 U/L (ref 40–150)
ALT SERPL W P-5'-P-CCNC: 30 U/L (ref 0–70)
ANION GAP SERPL CALCULATED.3IONS-SCNC: 10 MMOL/L (ref 3–14)
ANION GAP SERPL CALCULATED.3IONS-SCNC: 9 MMOL/L (ref 3–14)
AST SERPL W P-5'-P-CCNC: 8 U/L (ref 0–45)
BASOPHILS # BLD AUTO: 0 10E9/L (ref 0–0.2)
BASOPHILS NFR BLD AUTO: 0 %
BILIRUB SERPL-MCNC: 0.4 MG/DL (ref 0.2–1.3)
BUN SERPL-MCNC: 14 MG/DL (ref 7–30)
BUN SERPL-MCNC: 15 MG/DL (ref 7–30)
CALCIUM SERPL-MCNC: 7.1 MG/DL (ref 8.5–10.1)
CALCIUM SERPL-MCNC: 8.7 MG/DL (ref 8.5–10.1)
CHLORIDE SERPL-SCNC: 116 MMOL/L (ref 94–109)
CHLORIDE SERPL-SCNC: 116 MMOL/L (ref 94–109)
CO2 SERPL-SCNC: 19 MMOL/L (ref 20–32)
CO2 SERPL-SCNC: 27 MMOL/L (ref 20–32)
CREAT SERPL-MCNC: 0.43 MG/DL (ref 0.66–1.25)
CREAT SERPL-MCNC: 0.57 MG/DL (ref 0.66–1.25)
DIFFERENTIAL METHOD BLD: ABNORMAL
EOSINOPHIL # BLD AUTO: 0 10E9/L (ref 0–0.7)
EOSINOPHIL NFR BLD AUTO: 0 %
ERYTHROCYTE [DISTWIDTH] IN BLOOD BY AUTOMATED COUNT: 12.6 % (ref 10–15)
GFR SERPL CREATININE-BSD FRML MDRD: >90 ML/MIN/{1.73_M2}
GFR SERPL CREATININE-BSD FRML MDRD: >90 ML/MIN/{1.73_M2}
GLUCOSE SERPL-MCNC: 131 MG/DL (ref 70–99)
GLUCOSE SERPL-MCNC: 94 MG/DL (ref 70–99)
HCT VFR BLD AUTO: 37.9 % (ref 40–53)
HGB BLD-MCNC: 13.2 G/DL (ref 13.3–17.7)
IMM GRANULOCYTES # BLD: 0 10E9/L (ref 0–0.4)
IMM GRANULOCYTES NFR BLD: 0 %
LIPASE SERPL-CCNC: 12 U/L (ref 73–393)
LYMPHOCYTES # BLD AUTO: 0.5 10E9/L (ref 0.8–5.3)
LYMPHOCYTES NFR BLD AUTO: 7 %
MCH RBC QN AUTO: 31.4 PG (ref 26.5–33)
MCHC RBC AUTO-ENTMCNC: 34.8 G/DL (ref 31.5–36.5)
MCV RBC AUTO: 90 FL (ref 78–100)
MONOCYTES # BLD AUTO: 0.1 10E9/L (ref 0–1.3)
MONOCYTES NFR BLD AUTO: 1.5 %
NEUTROPHILS # BLD AUTO: 6.3 10E9/L (ref 1.6–8.3)
NEUTROPHILS NFR BLD AUTO: 91.5 %
NRBC # BLD AUTO: 0 10*3/UL
NRBC BLD AUTO-RTO: 0 /100
PLATELET # BLD AUTO: 275 10E9/L (ref 150–450)
POTASSIUM SERPL-SCNC: 2.6 MMOL/L (ref 3.4–5.3)
POTASSIUM SERPL-SCNC: 3.9 MMOL/L (ref 3.4–5.3)
PROT SERPL-MCNC: 5.7 G/DL (ref 6.8–8.8)
RBC # BLD AUTO: 4.21 10E12/L (ref 4.4–5.9)
SODIUM SERPL-SCNC: 145 MMOL/L (ref 133–144)
SODIUM SERPL-SCNC: 152 MMOL/L (ref 133–144)
WBC # BLD AUTO: 6.9 10E9/L (ref 4–11)

## 2020-02-21 PROCEDURE — 96374 THER/PROPH/DIAG INJ IV PUSH: CPT | Performed by: PEDIATRICS

## 2020-02-21 PROCEDURE — 99285 EMERGENCY DEPT VISIT HI MDM: CPT | Mod: Z6 | Performed by: PEDIATRICS

## 2020-02-21 PROCEDURE — 25000128 H RX IP 250 OP 636

## 2020-02-21 PROCEDURE — 80053 COMPREHEN METABOLIC PANEL: CPT | Performed by: PEDIATRICS

## 2020-02-21 PROCEDURE — 96375 TX/PRO/DX INJ NEW DRUG ADDON: CPT | Performed by: PEDIATRICS

## 2020-02-21 PROCEDURE — 85025 COMPLETE CBC W/AUTO DIFF WBC: CPT | Performed by: PEDIATRICS

## 2020-02-21 PROCEDURE — 96361 HYDRATE IV INFUSION ADD-ON: CPT | Performed by: PEDIATRICS

## 2020-02-21 PROCEDURE — 99284 EMERGENCY DEPT VISIT MOD MDM: CPT | Mod: 25 | Performed by: PEDIATRICS

## 2020-02-21 PROCEDURE — 25800030 ZZH RX IP 258 OP 636: Performed by: PEDIATRICS

## 2020-02-21 PROCEDURE — 25000128 H RX IP 250 OP 636: Performed by: PEDIATRICS

## 2020-02-21 PROCEDURE — 83690 ASSAY OF LIPASE: CPT | Performed by: PEDIATRICS

## 2020-02-21 PROCEDURE — 80048 BASIC METABOLIC PNL TOTAL CA: CPT | Performed by: PEDIATRICS

## 2020-02-21 PROCEDURE — 25800030 ZZH RX IP 258 OP 636

## 2020-02-21 RX ORDER — DEXTROSE MONOHYDRATE, SODIUM CHLORIDE, AND POTASSIUM CHLORIDE 50; 1.49; 4.5 G/1000ML; G/1000ML; G/1000ML
INJECTION, SOLUTION INTRAVENOUS ONCE
Status: COMPLETED | OUTPATIENT
Start: 2020-02-21 | End: 2020-02-21

## 2020-02-21 RX ORDER — HEPARIN SODIUM (PORCINE) LOCK FLUSH IV SOLN 100 UNIT/ML 100 UNIT/ML
5 SOLUTION INTRAVENOUS ONCE
Status: COMPLETED | OUTPATIENT
Start: 2020-02-21 | End: 2020-02-21

## 2020-02-21 RX ORDER — DEXTROSE MONOHYDRATE, SODIUM CHLORIDE, AND POTASSIUM CHLORIDE 50; .745; 4.5 G/1000ML; G/1000ML; G/1000ML
INJECTION, SOLUTION INTRAVENOUS CONTINUOUS
Status: DISCONTINUED | OUTPATIENT
Start: 2020-02-21 | End: 2020-02-21

## 2020-02-21 RX ORDER — DEXTROSE MONOHYDRATE, SODIUM CHLORIDE, AND POTASSIUM CHLORIDE 50; 1.49; 4.5 G/1000ML; G/1000ML; G/1000ML
INJECTION, SOLUTION INTRAVENOUS
Status: COMPLETED
Start: 2020-02-21 | End: 2020-02-21

## 2020-02-21 RX ORDER — ONDANSETRON 4 MG/1
4 TABLET, ORALLY DISINTEGRATING ORAL ONCE
Status: COMPLETED | OUTPATIENT
Start: 2020-02-21 | End: 2020-02-21

## 2020-02-21 RX ORDER — DIPHENHYDRAMINE HYDROCHLORIDE 50 MG/ML
25 INJECTION INTRAMUSCULAR; INTRAVENOUS ONCE
Status: COMPLETED | OUTPATIENT
Start: 2020-02-21 | End: 2020-02-21

## 2020-02-21 RX ORDER — ONDANSETRON 8 MG/1
8 TABLET, ORALLY DISINTEGRATING ORAL EVERY 8 HOURS PRN
Qty: 20 TABLET | Refills: 6 | Status: SHIPPED | OUTPATIENT
Start: 2020-02-21 | End: 2022-01-07

## 2020-02-21 RX ADMIN — SODIUM CHLORIDE 1000 ML: 9 INJECTION, SOLUTION INTRAVENOUS at 16:27

## 2020-02-21 RX ADMIN — HEPARIN 5 ML: 100 SYRINGE at 20:04

## 2020-02-21 RX ADMIN — ONDANSETRON 4 MG: 4 TABLET, ORALLY DISINTEGRATING ORAL at 16:19

## 2020-02-21 RX ADMIN — DIPHENHYDRAMINE HYDROCHLORIDE 25 MG: 50 INJECTION, SOLUTION INTRAMUSCULAR; INTRAVENOUS at 17:32

## 2020-02-21 RX ADMIN — POTASSIUM CHLORIDE, DEXTROSE MONOHYDRATE AND SODIUM CHLORIDE 1000 ML: 150; 5; 450 INJECTION, SOLUTION INTRAVENOUS at 17:34

## 2020-02-21 RX ADMIN — PROCHLORPERAZINE EDISYLATE 5 MG: 5 INJECTION INTRAMUSCULAR; INTRAVENOUS at 17:30

## 2020-02-21 RX ADMIN — DEXTROSE MONOHYDRATE, SODIUM CHLORIDE, AND POTASSIUM CHLORIDE 1000 ML: 50; 1.49; 4.5 INJECTION, SOLUTION INTRAVENOUS at 17:34

## 2020-02-21 NOTE — ED PROVIDER NOTES
History     Chief Complaint   Patient presents with     Vomiting     HPI    History obtained from family and patient    Lazaro is a 19 year old male who presents at  3:50 PM with vomiting for 1 day.  He has a history of T-cell lymphoblastic lymphoma and 1 day ago received chemotherapy.  Today he developed vomiting which has not been improved by oral Zofran tablets.  He also took 2 pills of Benadryl which did not stay down.  He has not had any bilious emesis and has had no diarrhea.  He does not complain of a headache, rash, fever, URI symptoms.  He does have some pain in his epigastric region.  He has a history of pancreatitis due to his prior chemotherapy regimen.    PMHx:  Past Medical History:   Diagnosis Date     Acute necrotizing pancreatitis 11/07/2019    attributed to asparaginase     Acute pancreatitis due to PEGaspariginase therapy  11/15/2019     DVT of upper extremity (deep vein thrombosis) (H) 09/26/2019    Bilateral      Edema of upper extremity 10/17/2019     Folliculitis 10/17/2019     Migraine 2006    have resolved     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     Past Surgical History:   Procedure Laterality Date     BONE MARROW BIOPSY, BONE SPECIMEN, NEEDLE/TROCAR Left 9/1/2019    Procedure: BIOPSY, BONE MARROW;  Surgeon: Heather Lopez MD;  [facility]     INSERT PICC LINE N/A 8/31/2019    Procedure: INSERTION, PICC;  Surgeon: Michell Keith MD;  [facility]     INSERT PORT VASCULAR ACCESS N/A 10/24/2019    Procedure: INSERTION, VASCULAR ACCESS PORT;  Surgeon: Silviano Martins MD;  [facility]      IR CHEST PORT PLACEMENT > 5 YRS OF AGE  10/24/2019     IR CHEST TUBE PLACEMENT NON-TUNNELLED LEFT  8/31/2019     IR PICC PLACEMENT > 5 YRS OF AGE  8/31/2019     IR PORT CHECK RIGHT  11/12/2019     SPINAL PUNCTURE,LUMBAR, INTRATHECAL CHEMO DELIVERY N/A 8/31/2019    Procedure: LUMBAR PUNCTURE, WITH INTRATHECAL CHEMOTHERAPY ADMINISTRATION;  Surgeon: Heather Lopez,  MD;  [facility]     SPINAL PUNCTURE,LUMBAR, INTRATHECAL CHEMO DELIVERY N/A 9/9/2019    Procedure: Lumbar Puncture With Intrathecal Chemo;  Surgeon: Alexi Hayes MD;  [facility]     SPINAL PUNCTURE,LUMBAR, INTRATHECAL CHEMO DELIVERY N/A 10/10/2019    Procedure: Lumbar puncture with IT Chemo (CD);  Surgeon: Reynaldo Campuzano MD;  [facility]      SPINAL PUNCTURE,LUMBAR, INTRATHECAL CHEMO DELIVERY N/A 10/17/2019    Procedure: Lumbar puncture with IT Chemo (not CD);  Surgeon: Reynaldo Campuzano MD;  [facility]      SPINAL PUNCTURE,LUMBAR, INTRATHECAL CHEMO DELIVERY N/A 10/24/2019    Procedure: LUMBAR PUNCTURE, WITH INTRATHECAL CHEMOTHERAPY ADMINISTRATION;  Surgeon: Félix Van APRN CNP;  [facility]      SPINAL PUNCTURE,LUMBAR, INTRATHECAL CHEMO DELIVERY N/A 10/31/2019    Procedure: Lumbar puncture with IT Chemo (not CD);  Surgeon: Reynaldo Campuzano MD;  [facility]      SPINAL PUNCTURE,LUMBAR, INTRATHECAL CHEMO DELIVERY N/A 12/19/2019    Procedure: Lumbar puncture with IT Chem (CD);  Surgeon: Félix Van APRN CNP;  [facility]      SPINAL PUNCTURE,LUMBAR, INTRATHECAL CHEMO DELIVERY N/A 12/23/2019    Procedure: Lumbar puncture with IT Chem (CD);  Surgeon: Heather Lopez MD;  [facility]      SPINAL PUNCTURE,LUMBAR, INTRATHECAL CHEMO DELIVERY N/A 1/23/2020    Procedure: Lumbar puncture with IT Chem (CD);  Surgeon: Reynaldo Campuzano MD;  [facility]      SPINAL PUNCTURE,LUMBAR, INTRATHECAL CHEMO DELIVERY N/A 2/20/2020    Procedure: Lumbar puncture with intrathecal Chemotherapy (CD);  Surgeon: Reynaldo Campuzano MD;  [facility]      THORACENTESIS N/A 8/31/2019    Procedure: Thoracentesis;  Surgeon: Mihcell Keith MD;  [facility]     These were reviewed with the patient/family.    MEDICATIONS were reviewed and are as  follows:   Current Facility-Administered Medications   Medication     0.9% sodium chloride BOLUS     Current Outpatient Medications   Medication     dexamethasone 2 MG PO tablet     diphenhydrAMINE (BENADRYL) 25 MG capsule     enoxaparin ANTICOAGULANT (LOVENOX) 100 MG/ML syringe     nicotine 14 MG/24HR TD 24 hr patch     ondansetron (ZOFRAN-ODT) 8 MG ODT tab     pantoprazole 40 MG PO EC tablet     polyethylene glycol (MIRALAX/GLYCOLAX) packet     scopolamine (TRANSDERM) 1 MG/3DAYS 72 hr patch     sennosides (SENOKOT) 8.6 MG tablet     sulfamethoxazole-trimethoprim (BACTRIM DS) 800-160 MG tablet     Vitamin D, Cholecalciferol, 25 MCG (1000 UT) TABS       ALLERGIES:  Asparaginase derivatives and No known drug allergies    IMMUNIZATIONS:  unknown by report.    SOCIAL HISTORY: Lazaro lives at home.      I have reviewed the Medications, Allergies, Past Medical and Surgical History, and Social History in the Epic system.    Review of Systems  Please see HPI for pertinent positives and negatives.  All other systems reviewed and found to be negative.        Physical Exam   BP: 133/86  Pulse: 75  Temp: 97.9  F (36.6  C)  Resp: 18  Weight: 83.6 kg (184 lb 4.9 oz)  SpO2: 99 %      Physical Exam   Appearance: Alert and appropriate, well developed, nontoxic, with moist mucous membranes.  HEENT: Head: Normocephalic and atraumatic. Eyes: PERRL, EOM grossly intact, conjunctivae and sclerae clear. Ears: Tympanic membranes clear bilaterally, without inflammation or effusion. Nose: Nares clear with no active discharge.  Mouth/Throat: No oral lesions, pharynx clear with no erythema or exudate.  Neck: Supple, no masses, no meningismus. No significant cervical lymphadenopathy.  Pulmonary: No grunting, flaring, retractions or stridor. Good air entry, clear to auscultation bilaterally, with no rales, rhonchi, or wheezing.  Cardiovascular: Regular rate and rhythm, normal S1 and S2, with no murmurs.  Normal symmetric peripheral pulses and  brisk cap refill.  Abdominal: Normal bowel sounds, soft, epigastric tenderness, nondistended, with no masses and no hepatosplenomegaly.  Neurologic: Alert and oriented, cranial nerves II-XII grossly intact, moving all extremities equally with grossly normal coordination and normal gait.  Extremities/Back: No deformity, no CVA tenderness.  Skin: No significant rashes, ecchymoses, or lacerations.  Genitourinary: Deferred  Rectal: Deferred      ED Course      Procedures    Results for orders placed or performed during the hospital encounter of 02/21/20 (from the past 24 hour(s))   CBC with platelets differential   Result Value Ref Range    WBC 6.9 4.0 - 11.0 10e9/L    RBC Count 4.21 (L) 4.4 - 5.9 10e12/L    Hemoglobin 13.2 (L) 13.3 - 17.7 g/dL    Hematocrit 37.9 (L) 40.0 - 53.0 %    MCV 90 78 - 100 fl    MCH 31.4 26.5 - 33.0 pg    MCHC 34.8 31.5 - 36.5 g/dL    RDW 12.6 10.0 - 15.0 %    Platelet Count 275 150 - 450 10e9/L    Diff Method Automated Method     % Neutrophils 91.5 %    % Lymphocytes 7.0 %    % Monocytes 1.5 %    % Eosinophils 0.0 %    % Basophils 0.0 %    % Immature Granulocytes 0.0 %    Nucleated RBCs 0 0 /100    Absolute Neutrophil 6.3 1.6 - 8.3 10e9/L    Absolute Lymphocytes 0.5 (L) 0.8 - 5.3 10e9/L    Absolute Monocytes 0.1 0.0 - 1.3 10e9/L    Absolute Eosinophils 0.0 0.0 - 0.7 10e9/L    Absolute Basophils 0.0 0.0 - 0.2 10e9/L    Abs Immature Granulocytes 0.0 0 - 0.4 10e9/L    Absolute Nucleated RBC 0.0    Comprehensive metabolic panel   Result Value Ref Range    Sodium 145 (H) 133 - 144 mmol/L    Potassium 2.6 (LL) 3.4 - 5.3 mmol/L    Chloride 116 (H) 94 - 109 mmol/L    Carbon Dioxide 19 (L) 20 - 32 mmol/L    Anion Gap 10 3 - 14 mmol/L    Glucose 94 70 - 99 mg/dL    Urea Nitrogen 14 7 - 30 mg/dL    Creatinine 0.43 (L) 0.66 - 1.25 mg/dL    GFR Estimate >90 >60 mL/min/[1.73_m2]    GFR Estimate If Black >90 >60 mL/min/[1.73_m2]    Calcium 7.1 (L) 8.5 - 10.1 mg/dL    Bilirubin Total 0.4 0.2 - 1.3 mg/dL     Albumin 3.4 3.4 - 5.0 g/dL    Protein Total 5.7 (L) 6.8 - 8.8 g/dL    Alkaline Phosphatase 52 40 - 150 U/L    ALT 30 0 - 70 U/L    AST 8 0 - 45 U/L   Lipase   Result Value Ref Range    Lipase 12 (L) 73 - 393 U/L       Medications   0.9% sodium chloride BOLUS (1,000 mLs Intravenous New Bag 2/21/20 1627)   ondansetron (ZOFRAN-ODT) ODT tab 4 mg (4 mg Oral Given 2/21/20 1619)       Old chart from [facility] reviewed, supported history as above.  Labs reviewed and normal.  Patient was attended to immediately upon arrival and assessed for immediate life-threatening conditions.  A consult was requested and obtained from pediatric oncology, who agreed with the assessment and plan as documented.    Critical care time:  none      Assessments & Plan (with Medical Decision Making)     I have reviewed the nursing notes.    I have reviewed the findings, diagnosis, plan and need for follow up with the patient.  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  He recently received chemotherapy so this is the presumed cause of his symptoms.  He had benign abdominal exam and appeared mildly dehydrated on clinical exam.  He received a 1 L normal saline bolus of fluid as well as had electrolytes checked which showed a mildly elevated sodium and decreased potassium.  He received oral Zofran disintegrating tablets and tolerated this well.  Afterwards he was feeling mildly improved and was given some Gatorade to see if he can tolerate this for home use.  Patient was signed over to Dr. Diggs at change of shift pending brief observation and trial of oral fluid hydration.  New Prescriptions    No medications on file       Final diagnoses:   Intractable vomiting with nausea, unspecified vomiting type   T lymphoblastic lymphoma (H)   Dehydration       2/21/2020   [facility] EMERGENCY DEPARTMENT     Tera Orosco MD  02/21/20 5482

## 2020-02-21 NOTE — ED AVS SNAPSHOT
[facility] Emergency Department  [address] [address]  Phone:  [phone]                                    Lazaro Lund   MRN: [id number]    Department:  [facility] Emergency Department   Date of Visit:  2/21/2020           After Visit Summary Signature Page    I have received my discharge instructions, and my questions have been answered. I have discussed any challenges I see with this plan with the nurse or doctor.    ..........................................................................................................................................  Patient/Patient Representative Signature      ..........................................................................................................................................  Patient Representative Print Name and Relationship to Patient    ..................................................               ................................................  Date                                   Time    ..........................................................................................................................................  Reviewed by Signature/Title    ...................................................              ..............................................  Date                                               Time          22EPIC Rev 08/18

## 2020-02-21 NOTE — TELEPHONE ENCOUNTER
Received a call on the RN triage line from Lazaro's grandmother, Angelica. Angelica had questions about the medications Lazaro should be taking. RN reviewed medications with grandmother. Grandmother also mentioned that Lazaro has been vomiting x24 hours. He attempts to take his PO anti-nausea medications but vomits shortly after taking them. Grandmother speaking with Lazaro during our conversation and per Lazaro, emesis is non bloody but the frequency of the vomiting has not changed in the past 24 hours. Dr. Fischer paged regarding frequent emesis. Received a call back from Dr. Hussein. Per Dr. Hussein, patient should come to ED to be evaluated and receive fluids if he is unable to keep medications and fluids down or if emesis is bloody. RN conveyed this information to Angelica and Lazaro. Plan to come to ED this afternoon.

## 2020-02-22 NOTE — DISCHARGE INSTRUCTIONS
Emergency Department Discharge Information for Lazaro Willis was seen in the [facility] Emergency Department today for vomiting  by Dr Orosco  and Dr Diggs .    We recommend that you take zofran scheduled for the next 24 hours .  Also take benadryl scheduled for the next 24 hours    For fever or pain, Lazaro can have:  Acetaminophen (Tylenol) every 4 to 6 hours as needed (up to 5 doses in 24 hours). His dose is: 2 regular strength tabs (650 mg)                                     (43.2+ kg/96+ lb)   Or  Ibuprofen (Advil, Motrin) every 6 hours as needed. His dose is:   1 tab of the 800 mg prescription tabs                                                                  (80+ kg/176+ lb)    If necessary, it is safe to give both Tylenol and ibuprofen, as long as you are careful not to give Tylenol more than every 4 hours or ibuprofen more than every 6 hours.    Note: If your Tylenol came with a dropper marked with 0.4 and 0.8 ml, call us ([phone]) or check with your doctor about the correct dose.     These doses are based on your child s weight. If you have a prescription for these medicines, the dose may be a little different. Either dose is safe. If you have questions, ask a doctor or pharmacist.     Please return to the ED or contact his primary physician if he becomes much more ill, if he has trouble breathing, he won't drink, he can't keep down liquids, he cries without tears, he gets a fever over 100.5F , he has severe pain, he is much more irritable or sleepier than usual, he gets a stiff neck, or if you have any other concerns.      Please make an appointment to follow up with Pediatric Heme/Onc  ([phone] - this number works for most pediatric specialties) in 1 days.        Medication side effect information:  All medicines may cause side effects. However, most people have no side effects or only have minor side effects.     People can be allergic to any medicine.  "Signs of an allergic reaction include rash, difficulty breathing or swallowing, wheezing, or unexplained swelling. If he has difficulty breathing or swallowing, call 911 or go right to the Emergency Department. For rash or other concerns, call his doctor.     If you have questions about side effects, please ask our staff. If you have questions about side effects or allergic reactions after you go home, ask your doctor or a pharmacist.     Some possible side effects of the medicines we are recommending for Lazaro are:     Acetaminophen (Tylenol, for fever or pain)  - Upset stomach or vomiting  - Talk to your doctor if you have liver disease        Ibuprofen  (Motrin, Advil. For fever or pain.)  - Upset stomach or vomiting  - Long term use may cause bleeding in the stomach or intestines. See his doctor if he has black or bloody vomit or stool (poop).        Ondansetron  (Zofran, for vomiting)  - Headache  - Diarrhea or constipation  - DO NOT take this medicine if you have the heart condition \"Long QT syndrome.\" Ask your doctor if you are not sure.       "

## 2020-02-24 NOTE — ED NOTES
Patient was signed out to me by Dr. Orosco at 5 PM.  Pediatric hematology oncology had come down to assess patient.  They had advised trying Compazine and Benadryl for nausea vomiting.  Patient received these anxiety doses and had improved nausea and vomiting.  He had 1-2 more episodes of dry heaving but had no emesis.  He was able to tolerate Gatorade in the emergency room.  Due to low potassium he was supplemented with a little bit of potassium in his IV fluids.   Second lab assessment was obtained but had elevated sodium and chloride as well as glucose.noticed this was thought to be possible spurious error from needing to waste more blood prior to sending sample for evaluation.  Patient is overall feeling better and after discussion with Angela hematology oncology was decided to that patient go home on scheduled Zofran and Benadryl for nausea/vomiting.  If he were to have continued symptoms he would have to return the emergency room tomorrow for admission for IV fluids .  Patient verbalized understanding of this.       Deneen Diggs MD  02/24/20 0023

## 2020-02-24 NOTE — PROGRESS NOTES
[facility]  PEDIATRIC HEMATOLOGY/ONCOLOGY   SOCIAL WORK PROGRESS NOTE      DATA:     Lazaro is a 19 year old male with T-Cell Lymphoblastic Lymphoma being treated per [facility] protocol [id number]. He presents to peds sedation on this date for Day 1 of Delayed Intensification. HONEY met supportively with Lazaro in Peds Sedation to check-in. Lazaro reports that overall he is doing well. He is still living with his Mom in [place]. He receives Social Security and is enrolled in Medical Assistance. He spends a lot of his days tami with friends on the computer. He enjoys spending time with friends, when they're not busy with work or school. He denied any concerns related to his mood. He is thinking of trying to get a part time job once he is finished with his delayed intensification phase of therapy. He is concerned that he might lose his Medical Assistance or Social Security. HONEY has sent him income guidelines for both programs and noted he will need to be in direct contact with them to ask further questions. 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 He was thinking 8-14 hours a week. He is still very much thinking about this presently. He denied any immediate needs/concerns at time of our visit. Grandma will be picking him up post procedure, later this afternoon.     INTERVENTION:     1. Supportive counseling. Check-in.   2. Contact information for [facility] and social security should he have questions about working and income guidelines.   3. Updated [place] pass.     ASSESSMENT:     Lazaro appears to be doing well. His mood is stable, affect within normal limits. No concerns surrounding mental health. He acknowledged being bored at home from time to time but finds it easy to distract himself with tami or socializing with friends. He feels well supported by family and friends. He is open to and appreciative of ongoing therapeutic support, advocacy, and connection with  resources.     PLAN:     Social work will continue to assess needs and provide ongoing psychosocial support and access to resources.      CHEY Davis, LICHONEY, OSW-C  Clinical    Pediatric Hematology Oncology   [facility]   Monday-Thursday   Phone: [phone]  Pager: [phone]    NO LETTER

## 2020-02-27 ENCOUNTER — OFFICE VISIT (OUTPATIENT)
Dept: PEDIATRIC HEMATOLOGY/ONCOLOGY | Facility: CLINIC | Age: 20
End: 2020-02-27
Attending: NURSE PRACTITIONER
Payer: MEDICAID

## 2020-02-27 ENCOUNTER — INFUSION THERAPY VISIT (OUTPATIENT)
Dept: INFUSION THERAPY | Facility: CLINIC | Age: 20
End: 2020-02-27
Attending: NURSE PRACTITIONER
Payer: MEDICAID

## 2020-02-27 VITALS
DIASTOLIC BLOOD PRESSURE: 63 MMHG | SYSTOLIC BLOOD PRESSURE: 131 MMHG | WEIGHT: 193.56 LBS | OXYGEN SATURATION: 100 % | HEIGHT: 73 IN | BODY MASS INDEX: 25.65 KG/M2 | TEMPERATURE: 98.3 F | HEART RATE: 77 BPM | RESPIRATION RATE: 20 BRPM

## 2020-02-27 DIAGNOSIS — Z79.01 ANTICOAGULATED: ICD-10-CM

## 2020-02-27 DIAGNOSIS — C83.50 T LYMPHOBLASTIC LYMPHOMA (H): Primary | ICD-10-CM

## 2020-02-27 LAB
BASOPHILS # BLD AUTO: 0 10E9/L (ref 0–0.2)
BASOPHILS NFR BLD AUTO: 0.1 %
DIFFERENTIAL METHOD BLD: ABNORMAL
EOSINOPHIL # BLD AUTO: 0 10E9/L (ref 0–0.7)
EOSINOPHIL NFR BLD AUTO: 0 %
ERYTHROCYTE [DISTWIDTH] IN BLOOD BY AUTOMATED COUNT: 12.1 % (ref 10–15)
HCT VFR BLD AUTO: 34.3 % (ref 40–53)
HGB BLD-MCNC: 11.8 G/DL (ref 13.3–17.7)
IMM GRANULOCYTES # BLD: 0.2 10E9/L (ref 0–0.4)
IMM GRANULOCYTES NFR BLD: 1.9 %
LMWH PPP CHRO-ACNC: 0.43 IU/ML
LYMPHOCYTES # BLD AUTO: 0.9 10E9/L (ref 0.8–5.3)
LYMPHOCYTES NFR BLD AUTO: 9.6 %
MCH RBC QN AUTO: 31.5 PG (ref 26.5–33)
MCHC RBC AUTO-ENTMCNC: 34.4 G/DL (ref 31.5–36.5)
MCV RBC AUTO: 92 FL (ref 78–100)
MONOCYTES # BLD AUTO: 0.6 10E9/L (ref 0–1.3)
MONOCYTES NFR BLD AUTO: 6.8 %
NEUTROPHILS # BLD AUTO: 7.3 10E9/L (ref 1.6–8.3)
NEUTROPHILS NFR BLD AUTO: 81.6 %
NRBC # BLD AUTO: 0 10*3/UL
NRBC BLD AUTO-RTO: 0 /100
PLATELET # BLD AUTO: 275 10E9/L (ref 150–450)
RBC # BLD AUTO: 3.75 10E12/L (ref 4.4–5.9)
WBC # BLD AUTO: 9 10E9/L (ref 4–11)

## 2020-02-27 PROCEDURE — 25000128 H RX IP 250 OP 636: Mod: ZF | Performed by: PEDIATRICS

## 2020-02-27 PROCEDURE — 25000125 ZZHC RX 250: Mod: ZF

## 2020-02-27 PROCEDURE — 25000128 H RX IP 250 OP 636: Mod: ZF

## 2020-02-27 PROCEDURE — 96409 CHEMO IV PUSH SNGL DRUG: CPT

## 2020-02-27 PROCEDURE — 96375 TX/PRO/DX INJ NEW DRUG ADDON: CPT

## 2020-02-27 PROCEDURE — 85025 COMPLETE CBC W/AUTO DIFF WBC: CPT | Performed by: PEDIATRICS

## 2020-02-27 PROCEDURE — 25800030 ZZH RX IP 258 OP 636: Mod: KQ | Performed by: PEDIATRICS

## 2020-02-27 PROCEDURE — 85520 HEPARIN ASSAY: CPT | Performed by: PEDIATRICS

## 2020-02-27 PROCEDURE — 96411 CHEMO IV PUSH ADDL DRUG: CPT

## 2020-02-27 RX ORDER — ONDANSETRON 2 MG/ML
INJECTION INTRAMUSCULAR; INTRAVENOUS
Status: COMPLETED
Start: 2020-02-27 | End: 2020-02-27

## 2020-02-27 RX ORDER — ONDANSETRON 2 MG/ML
8 INJECTION INTRAMUSCULAR; INTRAVENOUS ONCE
Status: COMPLETED | OUTPATIENT
Start: 2020-02-27 | End: 2020-02-27

## 2020-02-27 RX ORDER — HEPARIN SODIUM (PORCINE) LOCK FLUSH IV SOLN 100 UNIT/ML 100 UNIT/ML
SOLUTION INTRAVENOUS
Status: COMPLETED
Start: 2020-02-27 | End: 2020-02-27

## 2020-02-27 RX ORDER — HEPARIN SODIUM (PORCINE) LOCK FLUSH IV SOLN 100 UNIT/ML 100 UNIT/ML
5 SOLUTION INTRAVENOUS
Status: DISCONTINUED | OUTPATIENT
Start: 2020-02-27 | End: 2020-02-27 | Stop reason: HOSPADM

## 2020-02-27 RX ADMIN — LIDOCAINE HYDROCHLORIDE 1 ML: 3 GEL TOPICAL at 14:03

## 2020-02-27 RX ADMIN — HEPARIN 5 ML: 100 SYRINGE at 14:45

## 2020-02-27 RX ADMIN — ONDANSETRON 8 MG: 2 INJECTION INTRAMUSCULAR; INTRAVENOUS at 13:56

## 2020-02-27 RX ADMIN — HEPARIN SODIUM (PORCINE) LOCK FLUSH IV SOLN 100 UNIT/ML 5 ML: 100 SOLUTION at 14:45

## 2020-02-27 RX ADMIN — SODIUM CHLORIDE 50 MG: 9 INJECTION, SOLUTION INTRAVENOUS at 14:26

## 2020-02-27 RX ADMIN — VINCRISTINE SULFATE 2 MG: 1 INJECTION, SOLUTION INTRAVENOUS at 14:39

## 2020-02-27 ASSESSMENT — MIFFLIN-ST. JEOR: SCORE: 1930.49

## 2020-02-27 NOTE — PROGRESS NOTES
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 Lazaor presented with acute onset cough and was found to have an anterior mediastinal mass and malignant left-sided pleural effusion.  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  He was admitted to [facility] oncology service 8/30 and started on treatment per [id number].  He had a pleural effusion that required a chest tube and a pericardial effusion that was not drained. His Induction was complicated by cardiac compression secondary to his mass leading to tamponade, this improved through out his hospital stay.  He also had dysphagia that improved with treatment of his mass, swallow study was normal. His course was recently complicated by extensive bilateral UE DVTs, he remains on anticoagulation.  Most recently his course was complicated by the development of severe asparaginase induced pancreatitis. He became quite ill with SIRS and associated hypotension, he required pressor support in the ICU.  Fortunately Lazaro recovered well and his subsequent imaging has continued to show improvement.  He comes to clinic today for Day 8 of Delayed Intensification.    HPI:   Lazaro comes to clinic today by himself. He had significnat nausea following his Day 1 therapy that required an ED visit for fluids. He reports that following that visit he felt much better.  Interestingly his nausea started prior to his chemo that day and was triggered when his port was cleaned.  He denies mucositis.  No constipation.  Appetite has been good.  No new skin concerns although he does notice nail discoloration. The skin lesion due to his scop patch and headphones has resolved. He denies any pain.    Lazaro reports the nicotine patches are working well for him, much better than when he tried to quit cold turkey.  He has not had fever.  Sleeping OK at night. No  paresthesias. Bruising from lovenox, no bleeding.  Distance vision slightly blurry, has gotten a little worse while on steroids.    Review of systems:  Remainder of ROS is complete and negative.    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  He saw the [employer] movie he was looking forward to yesterday.    Current Medications:  Current Outpatient Medications on File Prior to Visit   Medication Sig Dispense Refill     dexamethasone 2 MG PO tablet Take 11mg (5.5 tablets) in the AM and 10mg (5 tabs) in the PM on Days 1-7 and 15-21. 147 tablet 0     diphenhydrAMINE (BENADRYL) 25 MG capsule Take 1-2 capsules (25-50 mg) by mouth every 6 hours as needed (Breakthrough Nausea and Vomiting ) 30 capsule 1     enoxaparin ANTICOAGULANT (LOVENOX) 100 MG/ML syringe Inject 1 mL (100 mg) Subcutaneous every 12 hours 180 mL 0     nicotine 14 MG/24HR TD 24 hr patch Place 1 patch onto the skin every 24 hours 42 patch 0     ondansetron (ZOFRAN-ODT) 8 MG ODT tab Take 1 tablet (8 mg) by mouth every 8 hours as needed for nausea 20 tablet 6     pantoprazole 40 MG PO EC tablet Take 1 tablet (40  "mg) by mouth every morning (before breakfast) 30 tablet 2     polyethylene glycol (MIRALAX/GLYCOLAX) packet Take 17 g by mouth daily 30 packet 0     scopolamine (TRANSDERM) 1 MG/3DAYS 72 hr patch Place 1 patch onto the skin every 72 hours 10 patch 3     sennosides (SENOKOT) 8.6 MG tablet Take 2 tablets by mouth 2 times daily as needed for constipation 60 each 1     sulfamethoxazole-trimethoprim (BACTRIM DS) 800-160 MG tablet Take 1 tablet by mouth Every Mon, Tues two times daily 16 tablet 11     Vitamin D, Cholecalciferol, 25 MCG (1000 UT) TABS Take 2 tablets (2,000 Units) by mouth daily 60 tablet 3   Lazaro reports he is taking bactrim, protonix and lovenox regularly.  No missed doses of dex.  Only took one dose on 2/20 but forgot to take his last dose this AM.    Received influenza vaccine for the 6795-8536 season.      Physical Exam:   Temp:  [98.3  F (36.8  C)] 98.3  F (36.8  C)  Pulse:  [77] 77  Resp:  [20] 20  BP: (131)/(63) 131/63  SpO2:  [100 %] 100 %  Wt Readings from Last 4 Encounters:   02/27/20 87.8 kg (193 lb 9 oz)   02/21/20 83.6 kg (184 lb 4.9 oz)   02/20/20 85.4 kg (188 lb 4.4 oz)   02/04/20 85.5 kg (188 lb 7.9 oz)     Ht Readings from Last 2 Encounters:   02/27/20 1.844 m (6' 0.6\")   02/20/20 1.848 m (6' 0.76\")     Lazaro feels his baseline weight was 180 lbs.  General: Lazaro is alert, interactive and appropriate. He appears well and is talkative.  HEENT: Skull is atrauamatic and normocephalic.  Full head of hair. PERRLA, sclera are non icteric and not injected, EOM are intact, gaze slightly disconjugate which is his baseline. Nares are patent without drainage. Oropharynx clear, no plaques noted. Buccal mucosa and tongue clear. MMM. Tympanic membranes are opaque bilaterally with light reflex and landmarks present.  Voice no longer hoarse.  Lymph:  Neck is supple without lymphadenopathy.  There is no supraclavicular, axillary or inguinal lymphadenopathy palpated.  Cardiovascular:  HR is regular, S1, " S2 no murmur.  Capillary refill is < 2 seconds. Right arm without edema or erythema.  No pitting edema.  Cap refill < 2 sec. Peripheral pulses 2+/=. He has mildly distended veins noted on the left upper chest, not extending up to the neck, overlying the pectoralis.   Respiratory: No cough noted. Respirations are easy.  Lungs are clear to auscultation through out.  No crackles or wheezes.  Gastrointestinal:  BS present in all quadrants.  Abdomen is soft and flat.  Lazaro denies LUQ discomfort, no pain with palpation. No hepatosplenomegaly or masses are palpated.  Skin: Lesion resolved in the left post auricular area.  No pain.  Bruises on thighs. No other skin lesions noted.  Neurological:  CN 2-12 grossly intact. Gait is normal.  No issues with balance. Sensation intact in hands and feet.     Musculoskeletal:  Good strength and ROM in all extremities.  Strong dorsiflexion at ankles and great toes (5/5) bilaterally without any pain at the Achilles.    Labs:   Results for orders placed or performed in visit on 02/27/20   CBC with platelets differential     Status: Abnormal   Result Value Ref Range    WBC 9.0 4.0 - 11.0 10e9/L    RBC Count 3.75 (L) 4.4 - 5.9 10e12/L    Hemoglobin 11.8 (L) 13.3 - 17.7 g/dL    Hematocrit 34.3 (L) 40.0 - 53.0 %    MCV 92 78 - 100 fl    MCH 31.5 26.5 - 33.0 pg    MCHC 34.4 31.5 - 36.5 g/dL    RDW 12.1 10.0 - 15.0 %    Platelet Count 275 150 - 450 10e9/L    Diff Method Automated Method     % Neutrophils 81.6 %    % Lymphocytes 9.6 %    % Monocytes 6.8 %    % Eosinophils 0.0 %    % Basophils 0.1 %    % Immature Granulocytes 1.9 %    Nucleated RBCs 0 0 /100    Absolute Neutrophil 7.3 1.6 - 8.3 10e9/L    Absolute Lymphocytes 0.9 0.8 - 5.3 10e9/L    Absolute Monocytes 0.6 0.0 - 1.3 10e9/L    Absolute Eosinophils 0.0 0.0 - 0.7 10e9/L    Absolute Basophils 0.0 0.0 - 0.2 10e9/L    Abs Immature Granulocytes 0.2 0 - 0.4 10e9/L    Absolute Nucleated RBC 0.0    Heparin 10a Level     Status: None    Result Value Ref Range    Heparin 10A Level 0.43 IU/mL       Assessment:  Lazaro Lund is a 19 year old young man with T cell lymphoblastic lymphoma (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  He is being treated per [facility] protocol [id number].   He's had a CRu following Induction with a CR at the end of Consolidation. His course has been complicated by extensive bilateral DVT and severe pancreatitis.  He continues on lovenox, most recent U/S is stable.  Recent abdominal CT shows continued improvement in regards to the sequelae from his pancreatitis.  Asparaginase will be permanently discontinued from his treatment regimen. He is on Vit D for deficiency.  TPMT/NUDT15 genotype is normal.  He comes to clinic today for Day 8 of Delayed Intensification.  He had significant nausea that started prior to his LP last week.  Fortunately this improved significant after getting fluids.  He is currently doing well.  Blood counts look good. He recently quit smoking.  Mild blurry vision.    Plan:   1) Reviewed labs with Lazaro, no transfusions needed.  Proceed with Day 8 of DI.   2) Discussed Lazaro's anticoagulation at our hematology meeting.  Given he has been anticoagulated for >3 months, has not had further propagation of thrombus and will not get any further asparaginase therapy we will discontinue his lovenox.  Low threshold to repeat U/S if any signs of thrombus.   3) Lazaro will have a repeat CT at the end of April per Dr Coronel. His last note indicates he would like to follow him with imaging until he has complete resolution.    4) Continue Vit D 3 2000IU daily, recheck at next visit.  5) Day 29 Induction LP deferred, original plan was to make up on Day 29 of Consolidation however given his recent complications we decided to defer this to Maintenance therapy.  6) TPMT and NUDT15 genotype is normal, plan for regular dose thiopurines.  7) Given significant spinal headache history will plan for IV caffeine following LPs in the future.   8)  Encouraged Lazaro to take zofran scheduled this evening and tomorrow AM, use benadryl for breakthrough nausea.  9) Take last steroid dose today when he gets home, then start next 7 day burst next Thursday.  10) Recommend Lazaro have his eyes checked, however he should wait until he's been off steroids for at least a few weeks.  11) Lazaro is doing well with smoking cessation, he has adequate nicotine patches, continue to follow.  Discussed marijuana use and that I would strongly recommend he avoid using marijuana, especially during Delayed Intensification due to risk for fungal infection.  12) RTC in one week for Day 15, he will call in the interim with any concerns.  Reiterated the importance of immediate care with fever given this high risk course.

## 2020-02-27 NOTE — PROGRESS NOTES
Infusion Nursing Note    Lazaro Lund Presents to [facility] today for: chemotherapy    Due to :    T lymphoblastic lymphoma (H)  Anticoagulated    Intravenous Access/Labs:  Port accessed using sterile technique without issue.     Coping:   [facility] declined for port access.  Pt is adult.     Infusion Note: Following port access, baseline labs were ordered.  Pt was seen and assessed by Luana Van NP. Premedication of zofran given. Vincristine and Doxorubicin were both given by gravity.  Positive blood returns noted pre, mid, and post infusions.  VS remained stable throughout.  Following chemotherapy, port was flushed, heparin locked, and de-accessed.      Discharge Plan:   Patient verbalized understanding of discharge instructions. Pt left [facility] in stable condition at conclusion of appt.

## 2020-02-27 NOTE — LETTER
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 Lazaro presented with acute onset cough and was found to have an anterior mediastinal mass and malignant left-sided pleural effusion.  A CT guided biopsy was obtained at [facility] and pathology was consistent with T-cell leukemia vs lymphoma.  He was admitted to [facility] oncology service 8/30 and started on treatment per [id number].  He had a pleural effusion that required a chest tube and a pericardial effusion that was not drained. His Induction was complicated by cardiac compression secondary to his mass leading to tamponade, this improved through out his hospital stay.  He also had dysphagia that improved with treatment of his mass, swallow study was normal. His course was recently complicated by extensive bilateral UE DVTs, he remains on anticoagulation.  Most recently his course was complicated by the development of severe asparaginase induced pancreatitis. He became quite ill with SIRS and associated hypotension, he required pressor support in the ICU.  Fortunately Lazaro recovered well and his subsequent imaging has continued to show improvement.  He comes to clinic today for Day 8 of Delayed Intensification.    HPI:   Lazaro comes to clinic today by himself. He had significnat nausea following his Day 1 therapy that required an ED visit for fluids. He reports that following that visit he felt much better.  Interestingly his nausea started prior to his chemo that day and was triggered when his port was cleaned.  He denies mucositis.  No constipation.  Appetite has been good.  No new skin concerns although he does notice nail discoloration. The skin lesion due to his scop patch and headphones has resolved. He denies any pain.    Lazaro reports the nicotine patches are working well for him, much better than  when he tried to quit cold turkey.  He has not had fever.  Sleeping OK at night. No paresthesias. Bruising from lovenox, no bleeding.  Distance vision slightly blurry, has gotten a little worse while on steroids.    Review of systems:  Remainder of ROS is complete and negative.    PMH:   Past Medical History:   Diagnosis Date     Acute necrotizing pancreatitis 11/07/2019    attributed to asparaginase     Acute pancreatitis due to PEGaspariginase therapy  11/15/2019     DVT of upper extremity (deep vein thrombosis) (H) 09/26/2019    Bilateral      Edema of upper extremity 10/17/2019     Folliculitis 10/17/2019     Migraine 2006    have resolved     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   Spinal HA following LP  TPMT shows Intermediate activity  Factor V leiden and prothrombin negative  Pancreatitis secondary to asparaginase    PFMH:   Family History   Problem Relation Age of Onset     No Known Problems Mother      No Known Problems Father      Asthma Brother      Thyroid Cancer Paternal Grandmother      Melanoma Paternal Aunt        Social History: Lazaro previously lived at home with his dad and step mom in [place] but is now staying with his mom in [place].  He saw the [employer] movie he was looking forward to yesterday.    Current Medications:  Current Outpatient Medications on File Prior to Visit   Medication Sig Dispense Refill     dexamethasone 2 MG PO tablet Take 11mg (5.5 tablets) in the AM and 10mg (5 tabs) in the PM on Days 1-7 and 15-21. 147 tablet 0     diphenhydrAMINE (BENADRYL) 25 MG capsule Take 1-2 capsules (25-50 mg) by mouth every 6 hours as needed (Breakthrough Nausea and Vomiting ) 30 capsule 1     enoxaparin ANTICOAGULANT (LOVENOX) 100 MG/ML syringe Inject 1 mL (100 mg) Subcutaneous every 12 hours 180 mL 0     nicotine 14 MG/24HR TD 24 hr patch Place 1 patch onto the skin every 24 hours 42 patch 0     ondansetron (ZOFRAN-ODT) 8 MG ODT tab Take 1 tablet (8 mg) by mouth every 8 hours as  "needed for nausea 20 tablet 6     pantoprazole 40 MG PO EC tablet Take 1 tablet (40 mg) by mouth every morning (before breakfast) 30 tablet 2     polyethylene glycol (MIRALAX/GLYCOLAX) packet Take 17 g by mouth daily 30 packet 0     scopolamine (TRANSDERM) 1 MG/3DAYS 72 hr patch Place 1 patch onto the skin every 72 hours 10 patch 3     sennosides (SENOKOT) 8.6 MG tablet Take 2 tablets by mouth 2 times daily as needed for constipation 60 each 1     sulfamethoxazole-trimethoprim (BACTRIM DS) 800-160 MG tablet Take 1 tablet by mouth Every Mon, Tues two times daily 16 tablet 11     Vitamin D, Cholecalciferol, 25 MCG (1000 UT) TABS Take 2 tablets (2,000 Units) by mouth daily 60 tablet 3   Lazaro reports he is taking bactrim, protonix and lovenox regularly.  No missed doses of dex.  Only took one dose on 2/20 but forgot to take his last dose this AM.    Received influenza vaccine for the 2605-2444 season.      Physical Exam:   Temp:  [98.3  F (36.8  C)] 98.3  F (36.8  C)  Pulse:  [77] 77  Resp:  [20] 20  BP: (131)/(63) 131/63  SpO2:  [100 %] 100 %  Wt Readings from Last 4 Encounters:   02/27/20 87.8 kg (193 lb 9 oz)   02/21/20 83.6 kg (184 lb 4.9 oz)   02/20/20 85.4 kg (188 lb 4.4 oz)   02/04/20 85.5 kg (188 lb 7.9 oz)     Ht Readings from Last 2 Encounters:   02/27/20 1.844 m (6' 0.6\")   02/20/20 1.848 m (6' 0.76\")     Lazaro feels his baseline weight was 180 lbs.  General: Lazaro is alert, interactive and appropriate. He appears well and is talkative.  HEENT: Skull is atrauamatic and normocephalic.  Full head of hair. PERRLA, sclera are non icteric and not injected, EOM are intact, gaze slightly disconjugate which is his baseline. Nares are patent without drainage. Oropharynx clear, no plaques noted. Buccal mucosa and tongue clear. MMM. Tympanic membranes are opaque bilaterally with light reflex and landmarks present.  Voice no longer hoarse.  Lymph:  Neck is supple without lymphadenopathy.  There is no supraclavicular, " axillary or inguinal lymphadenopathy palpated.  Cardiovascular:  HR is regular, S1, S2 no murmur.  Capillary refill is < 2 seconds. Right arm without edema or erythema.  No pitting edema.  Cap refill < 2 sec. Peripheral pulses 2+/=. He has mildly distended veins noted on the left upper chest, not extending up to the neck, overlying the pectoralis.   Respiratory: No cough noted. Respirations are easy.  Lungs are clear to auscultation through out.  No crackles or wheezes.  Gastrointestinal:  BS present in all quadrants.  Abdomen is soft and flat.  Lazaro denies LUQ discomfort, no pain with palpation. No hepatosplenomegaly or masses are palpated.  Skin: Lesion resolved in the left post auricular area.  No pain.  Bruises on thighs. No other skin lesions noted.  Neurological:  CN 2-12 grossly intact. Gait is normal.  No issues with balance. Sensation intact in hands and feet.     Musculoskeletal:  Good strength and ROM in all extremities.  Strong dorsiflexion at ankles and great toes (5/5) bilaterally without any pain at the Achilles.    Labs:   Results for orders placed or performed in visit on 02/27/20   CBC with platelets differential     Status: Abnormal   Result Value Ref Range    WBC 9.0 4.0 - 11.0 10e9/L    RBC Count 3.75 (L) 4.4 - 5.9 10e12/L    Hemoglobin 11.8 (L) 13.3 - 17.7 g/dL    Hematocrit 34.3 (L) 40.0 - 53.0 %    MCV 92 78 - 100 fl    MCH 31.5 26.5 - 33.0 pg    MCHC 34.4 31.5 - 36.5 g/dL    RDW 12.1 10.0 - 15.0 %    Platelet Count 275 150 - 450 10e9/L    Diff Method Automated Method     % Neutrophils 81.6 %    % Lymphocytes 9.6 %    % Monocytes 6.8 %    % Eosinophils 0.0 %    % Basophils 0.1 %    % Immature Granulocytes 1.9 %    Nucleated RBCs 0 0 /100    Absolute Neutrophil 7.3 1.6 - 8.3 10e9/L    Absolute Lymphocytes 0.9 0.8 - 5.3 10e9/L    Absolute Monocytes 0.6 0.0 - 1.3 10e9/L    Absolute Eosinophils 0.0 0.0 - 0.7 10e9/L    Absolute Basophils 0.0 0.0 - 0.2 10e9/L    Abs Immature Granulocytes 0.2 0 -  0.4 10e9/L    Absolute Nucleated RBC 0.0    Heparin 10a Level     Status: None   Result Value Ref Range    Heparin 10A Level 0.43 IU/mL       Assessment:  Lazaro Lund is a 19 year old young man with T cell lymphoblastic lymphoma (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  He is being treated per [facility] protocol [id number].   He's had a CRu following Induction with a CR at the end of Consolidation. His course has been complicated by extensive bilateral DVT and severe pancreatitis.  He continues on lovenox, most recent U/S is stable.  Recent abdominal CT shows continued improvement in regards to the sequelae from his pancreatitis.  Asparaginase will be permanently discontinued from his treatment regimen. He is on Vit D for deficiency.  TPMT/NUDT15 genotype is normal.  He comes to clinic today for Day 8 of Delayed Intensification.  He had significant nausea that started prior to his LP last week.  Fortunately this improved significant after getting fluids.  He is currently doing well.  Blood counts look good. He recently quit smoking.  Mild blurry vision.    Plan:   1) Reviewed labs with Lazaro, no transfusions needed.  Proceed with Day 8 of DI.   2) Discussed Lazaro's anticoagulation at our hematology meeting.  Given he has been anticoagulated for >3 months, has not had further propagation of thrombus and will not get any further asparaginase therapy we will discontinue his lovenox.  Low threshold to repeat U/S if any signs of thrombus.   3) Lazaro will have a repeat CT at the end of April per Dr Coronel. His last note indicates he would like to follow him with imaging until he has complete resolution.    4) Continue Vit D 3 2000IU daily, recheck at next visit.  5) Day 29 Induction LP deferred, original plan was to make up on Day 29 of Consolidation however given his recent complications we decided to defer this to Maintenance therapy.  6) TPMT and NUDT15 genotype is normal, plan for regular dose thiopurines.  7) Given significant  spinal headache history will plan for IV caffeine following LPs in the future.   8) Encouraged Lazaro to take zofran scheduled this evening and tomorrow AM, use benadryl for breakthrough nausea.  9) Take last steroid dose today when he gets home, then start next 7 day burst next Thursday.  10) Recommend Lazaro have his eyes checked, however he should wait until he's been off steroids for at least a few weeks.  11) Lazaro is doing well with smoking cessation, he has adequate nicotine patches, continue to follow.  Discussed marijuana use and that I would strongly recommend he avoid using marijuana, especially during Delayed Intensification due to risk for fungal infection.  12) RTC in one week for Day 15, he will call in the interim with any concerns.  Reiterated the importance of immediate care with fever given this high risk course.    YOHAN Dunn CNP

## 2020-03-03 NOTE — PROGRESS NOTES
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 He is currently day 15 of Delayed Intensification.    With induction, he had a CRu, resolution of lymphadenopathy, but persistence of small pleural effusion (resolved after consolidation). Induction was complicated by development of extensive upper DVT complicated by edema and folliculitis preventing day 29 LP, and consolidation was complicated by severe PEG-asparaginase induced necrotizing pancreatitis, being monitored by Dr. Montgomery. As a result, PEG-asparaginase has been discontinued from his treatment regimen. He is otherwise well and tolerating his current cycle of chemo.    Plan  1. Labs today reviewed: Proceed with vincristine and doxorubicin and start second 7 day course of decadron   1. Reviewed anticipatory nausea management, constipation management, and importance of hydration  2. Following with Dr. Coronel for his pancreatitis: follow up CT in April  3. Continue efforts at smoking cessation; Continue nicotine patch - step 2 for 6 weeks (until 4/2/20) and then 6 weeks at step 3. Discussed marijuana use and that I would strongly recommend he avoid smoking marijuana, especially during Delayed Intensification due to risk for fungal infection. We will make a referral for enrollment with medical cannabis - perhaps non-inhalation forms will help him break his habit of smoking  4. Now off of anticoagulation: low threshold to repeat US if any signs of thrombus  5. Continue vitamin D: repeat level pending today  6. Day 29 induction LP that was deferred will be made up during maintenance therapy  1. Given significant spinal headache history will plan for IV caffeine following LPs in the future.   7. Given pancreatitis and intermediate TPMT phenotype his 6MP dose was reduced for the 2nd  half of Consolidation by 50%. TPMT and NUDT15 genotype is normal, plan for regular dose thiopurines moving forward   8. Recommend Lazaro have his eyes checked, however he should wait until he's been off steroids for at least a few weeks.  9. RTC on 3/12/20 for labs, d29 of DI to start on 3/19 pending counts    Signed,  Nicho Fischer   Pediatric Hematology/Oncology Fellow    Physician Attestation    I saw and evaluated the patient. I discussed with the fellow and agree with the findings and plan as documented in the fellow's note.     Reynaldo Campuzano MD  Pediatric Hematology/Oncology  [facility]    Primary Care Physician   Rodriguez Montoya    Interim History  Lazaro presents today for day 15 of delayed intensification    Lazaro is overall doing well at home. His appetite is good and he has not had fever, chest pain, shortness of breath, headache, abdominal pain, constipation, weakness. With regards to his vision, Lazaro has not noticed any changes. Lazaro continues to make efforts to stop smoking, and is using his nicotine patch. He has not smoked a cigarette since starting the nicotine patch, but he has replaced the habit of smoking cigarettes with the habit of smoking marijuana. He does not use marijuana for symptoms management - instead it is a behavioral motivation.     Lazaro is hoping to be able to work again, and he has verified with his insurance that this will not interfere with his benefits. 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     Oncology History  Lazaro Lund is a 19 year old young man with T-cell lymphoblastic lymphoma, pratt stage III. Lazaro presented with chronic cough with progressive orthopnea and was found to have an anterior mediastinal mass and malignant left-sided pleural effusion.  A CT guided biopsy was obtained at [facility] and pathology was  consistent with T-cell neoplasm.  He was admitted to [facility] oncology service 8/30 and started on treatment per [id number].  He had a pleural effusion that required a chest tube and a pericardial effusion that was not drained. His Induction was complicated by cardiac compression secondary to his mass leading to tamponade, this improved through out his hospital stay. His induction course was complicated by extensive bilateral UE DVTs for which anticoagulation was started. At end of induction, he had a CRu response (persistent effusion on imaging). Consolidation was complicated by the development of severe asparaginase induced pancreatitis. Due to this, asparaginase was omitted from his treatment plan. His end of consolidation scan showed resolved pleural effusion and no evidence of disease. He tolerated interim maintenance well, and during delayed intensification, his lovenox was discontinued following three months of therapy. He is now in delayed intensification.     Past Medical History    I have reviewed this patient's medical history and updated it with pertinent information if needed.   Past Medical History:   Diagnosis Date     Acute necrotizing pancreatitis 11/07/2019    attributed to asparaginase     Acute pancreatitis due to PEGaspariginase therapy  11/15/2019     DVT of upper extremity (deep vein thrombosis) (H) 09/26/2019    Bilateral      Edema of upper extremity 10/17/2019     Folliculitis 10/17/2019     Migraine 2006    have resolved     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       Past Surgical History   I have reviewed this patient's surgical history and updated it with pertinent information if needed.  Past Surgical History:   Procedure Laterality Date     BONE MARROW BIOPSY, BONE SPECIMEN, NEEDLE/TROCAR Left 9/1/2019    Procedure: BIOPSY, BONE MARROW;  Surgeon: Heather Lopez MD;  Location: [facility] OR     INSERT PICC LINE N/A 8/31/2019    Procedure: INSERTION, PICC;  Surgeon: Michell Keith MD;   [facility] OR     INSERT PORT VASCULAR ACCESS N/A 10/24/2019    Procedure: INSERTION, VASCULAR ACCESS PORT;  Surgeon: Silviano Martins MD;  [facility] PEDS SED[facility]      IR CHEST PORT PLACEMENT > 5 YRS OF AGE  10/24/2019     IR CHEST TUBE PLACEMENT NON-TUNNELLED LEFT  8/31/2019     IR PICC PLACEMENT > 5 YRS OF AGE  8/31/2019     IR PORT CHECK RIGHT  11/12/2019     SPINAL PUNCTURE,LUMBAR, INTRATHECAL CHEMO DELIVERY N/A 8/31/2019    Procedure: LUMBAR PUNCTURE, WITH INTRATHECAL CHEMOTHERAPY ADMINISTRATION;  Surgeon: Heather Lopez MD;  [facility]     SPINAL PUNCTURE,LUMBAR, INTRATHECAL CHEMO DELIVERY N/A 9/9/2019    Procedure: Lumbar Puncture With Intrathecal Chemo;  Surgeon: Alexi Hayes MD;  [facility]     SPINAL PUNCTURE,LUMBAR, INTRATHECAL CHEMO DELIVERY N/A 10/10/2019    Procedure: Lumbar puncture with IT Chemo (CD);  Surgeon: Reynaldo Campuzano MD;  [facility]      SPINAL PUNCTURE,LUMBAR, INTRATHECAL CHEMO DELIVERY N/A 10/17/2019    Procedure: Lumbar puncture with IT Chemo (not CD);  Surgeon: Reynaldo Campuzano MD;  [facility]      SPINAL PUNCTURE,LUMBAR, INTRATHECAL CHEMO DELIVERY N/A 10/24/2019    Procedure: LUMBAR PUNCTURE, WITH INTRATHECAL CHEMOTHERAPY ADMINISTRATION;  Surgeon: Félix Van APRN CNP;  [facility]      SPINAL PUNCTURE,LUMBAR, INTRATHECAL CHEMO DELIVERY N/A 10/31/2019    Procedure: Lumbar puncture with IT Chemo (not CD);  Surgeon: Reynaldo Campuzano MD;  [facility]      SPINAL PUNCTURE,LUMBAR, INTRATHECAL CHEMO DELIVERY N/A 12/19/2019    Procedure: Lumbar puncture with IT Chem (CD);  Surgeon: Félix Van APRN CNP;  [facility]      SPINAL PUNCTURE,LUMBAR, INTRATHECAL CHEMO DELIVERY N/A 12/23/2019    Procedure: Lumbar puncture with IT Chem (CD);  Surgeon: Heather Lopez MD;  [facility]      SPINAL PUNCTURE,LUMBAR, INTRATHECAL  CHEMO DELIVERY N/A 1/23/2020    Procedure: Lumbar puncture with IT Chem (CD);  Surgeon: Reynaldo Campuzano MD;  [facility]      SPINAL PUNCTURE,LUMBAR, INTRATHECAL CHEMO DELIVERY N/A 2/20/2020    Procedure: Lumbar puncture with intrathecal Chemotherapy (CD);  Surgeon: Reynaldo Campuzano MD;  [facility]      THORACENTESIS N/A 8/31/2019    Procedure: Thoracentesis;  Surgeon: Michell Keith MD;  [facility]       Medications   Current Outpatient Medications on File Prior to Visit   Medication Sig Dispense Refill     dexamethasone 2 MG PO tablet Take 11mg (5.5 tablets) in the AM and 10mg (5 tabs) in the PM on Days 1-7 and 15-21. 147 tablet 0     diphenhydrAMINE (BENADRYL) 25 MG capsule Take 1-2 capsules (25-50 mg) by mouth every 6 hours as needed (Breakthrough Nausea and Vomiting ) 30 capsule 1     nicotine 14 MG/24HR TD 24 hr patch Place 1 patch onto the skin every 24 hours 42 patch 0     ondansetron (ZOFRAN-ODT) 8 MG ODT tab Take 1 tablet (8 mg) by mouth every 8 hours as needed for nausea 20 tablet 6     pantoprazole 40 MG PO EC tablet Take 1 tablet (40 mg) by mouth every morning (before breakfast) 30 tablet 2     polyethylene glycol (MIRALAX/GLYCOLAX) packet Take 17 g by mouth daily 30 packet 0     scopolamine (TRANSDERM) 1 MG/3DAYS 72 hr patch Place 1 patch onto the skin every 72 hours 10 patch 3     sennosides (SENOKOT) 8.6 MG tablet Take 2 tablets by mouth 2 times daily as needed for constipation 60 each 1     sulfamethoxazole-trimethoprim (BACTRIM DS) 800-160 MG tablet Take 1 tablet by mouth Every Mon, Tues two times daily 16 tablet 11     Vitamin D, Cholecalciferol, 25 MCG (1000 UT) TABS Take 2 tablets (2,000 Units) by mouth daily 60 tablet 3     Allergies   Allergies   Allergen Reactions     Asparaginase Derivatives Other (See Comments)     Severe pancreatitis     No Known Drug Allergies        Social History   I have updated and reviewed the following Social  History Narrative:   Pediatric History   Patient Parents     JHONATHAN RODRIGUEZ (Mother)     AVNI RODRIGUEZ (Father)     Other Topics Concern     Not on file   Social History Narrative    Lives at home in [place] with Dad and Step-Mom. Biological mother is involved in his life. Has 4 step-siblings and 1 biological sibling. 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 Has completed high school. Currently, he wants to return to work.      Family History   I have reviewed this patient's family history and updated it with pertinent information if needed.   Family History   Problem Relation Age of Onset     No Known Problems Mother      No Known Problems Father      Asthma Brother      Thyroid Cancer Paternal Grandmother      Melanoma Paternal Aunt        Review of Systems   The 10 point Review of Systems is negative other than noted in the HPI or here.     Physical Exam   Vital Signs with Ranges  Temp:  [98.1  F (36.7  C)] 98.1  F (36.7  C)  Pulse:  [90] 90  Resp:  [18] 18  BP: (131)/(79) 131/79  SpO2:  [98 %] 98 %    General: Lazaro is alert, no distress  HEENT: Skull is atrauamatic and normocephalic.  Full head of hair, thinning. PERRLA, sclera are non icteric and not injected, EOM are intact. Nares are patent without drainage. Oropharynx clear, no plaques noted. Buccal mucosa and tongue clear. MMM.   Lymph:  Neck is supple without lymphadenopathy.  There is no supraclavicular, axillary or inguinal lymphadenopathy palpated.  Cardiovascular:  HR is normal with regular rhythm, no murmur.  Capillary refill is < 2 seconds. Right arm without edema or erythema.  No pitting edema.   Respiratory: No cough noted. Respirations are easy.  Lungs are clear to auscultation through out.  No crackles or wheezes.  Gastrointestinal:  BS present in all quadrants.  Abdomen is soft and flat. No tenderness with palpation. No  hepatosplenomegaly or mass.  Skin: No rashes, bruises or other skin lesions noted.  Neurological:  No focal deficits  Musculoskeletal:  Good strength and ROM in all extremities.  Strong dorsiflexion at ankles and great toes (5/5) bilaterally without any pain at the Achilles.    Data   Results for orders placed or performed in visit on 03/05/20 (from the past 24 hour(s))   CBC with platelets differential   Result Value Ref Range    WBC 4.7 4.0 - 11.0 10e9/L    RBC Count 4.24 (L) 4.4 - 5.9 10e12/L    Hemoglobin 12.8 (L) 13.3 - 17.7 g/dL    Hematocrit 39.5 (L) 40.0 - 53.0 %    MCV 93 78 - 100 fl    MCH 30.2 26.5 - 33.0 pg    MCHC 32.4 31.5 - 36.5 g/dL    RDW 12.4 10.0 - 15.0 %    Platelet Count 237 150 - 450 10e9/L    Diff Method Automated Method     % Neutrophils 73.3 %    % Lymphocytes 17.2 %    % Monocytes 7.0 %    % Eosinophils 1.5 %    % Basophils 0.6 %    % Immature Granulocytes 0.4 %    Nucleated RBCs 0 0 /100    Absolute Neutrophil 3.5 1.6 - 8.3 10e9/L    Absolute Lymphocytes 0.8 0.8 - 5.3 10e9/L    Absolute Monocytes 0.3 0.0 - 1.3 10e9/L    Absolute Eosinophils 0.1 0.0 - 0.7 10e9/L    Absolute Basophils 0.0 0.0 - 0.2 10e9/L    Abs Immature Granulocytes 0.0 0 - 0.4 10e9/L    Absolute Nucleated RBC 0.0

## 2020-03-05 ENCOUNTER — INFUSION THERAPY VISIT (OUTPATIENT)
Dept: INFUSION THERAPY | Facility: CLINIC | Age: 20
End: 2020-03-05
Attending: PEDIATRICS
Payer: COMMERCIAL

## 2020-03-05 ENCOUNTER — OFFICE VISIT (OUTPATIENT)
Dept: PEDIATRIC HEMATOLOGY/ONCOLOGY | Facility: CLINIC | Age: 20
End: 2020-03-05
Attending: PEDIATRICS
Payer: COMMERCIAL

## 2020-03-05 VITALS
RESPIRATION RATE: 18 BRPM | HEART RATE: 90 BPM | DIASTOLIC BLOOD PRESSURE: 79 MMHG | TEMPERATURE: 98.1 F | WEIGHT: 190.04 LBS | BODY MASS INDEX: 25.74 KG/M2 | HEIGHT: 72 IN | SYSTOLIC BLOOD PRESSURE: 131 MMHG | OXYGEN SATURATION: 98 %

## 2020-03-05 DIAGNOSIS — C83.50 T LYMPHOBLASTIC LYMPHOMA (H): Primary | ICD-10-CM

## 2020-03-05 DIAGNOSIS — Z71.6 ENCOUNTER FOR SMOKING CESSATION COUNSELING: ICD-10-CM

## 2020-03-05 LAB
BASOPHILS # BLD AUTO: 0 10E9/L (ref 0–0.2)
BASOPHILS NFR BLD AUTO: 0.6 %
DEPRECATED CALCIDIOL+CALCIFEROL SERPL-MC: 19 UG/L (ref 20–75)
DIFFERENTIAL METHOD BLD: ABNORMAL
EOSINOPHIL # BLD AUTO: 0.1 10E9/L (ref 0–0.7)
EOSINOPHIL NFR BLD AUTO: 1.5 %
ERYTHROCYTE [DISTWIDTH] IN BLOOD BY AUTOMATED COUNT: 12.4 % (ref 10–15)
HCT VFR BLD AUTO: 39.5 % (ref 40–53)
HGB BLD-MCNC: 12.8 G/DL (ref 13.3–17.7)
IMM GRANULOCYTES # BLD: 0 10E9/L (ref 0–0.4)
IMM GRANULOCYTES NFR BLD: 0.4 %
LYMPHOCYTES # BLD AUTO: 0.8 10E9/L (ref 0.8–5.3)
LYMPHOCYTES NFR BLD AUTO: 17.2 %
MCH RBC QN AUTO: 30.2 PG (ref 26.5–33)
MCHC RBC AUTO-ENTMCNC: 32.4 G/DL (ref 31.5–36.5)
MCV RBC AUTO: 93 FL (ref 78–100)
MONOCYTES # BLD AUTO: 0.3 10E9/L (ref 0–1.3)
MONOCYTES NFR BLD AUTO: 7 %
NEUTROPHILS # BLD AUTO: 3.5 10E9/L (ref 1.6–8.3)
NEUTROPHILS NFR BLD AUTO: 73.3 %
NRBC # BLD AUTO: 0 10*3/UL
NRBC BLD AUTO-RTO: 0 /100
PLATELET # BLD AUTO: 237 10E9/L (ref 150–450)
RBC # BLD AUTO: 4.24 10E12/L (ref 4.4–5.9)
WBC # BLD AUTO: 4.7 10E9/L (ref 4–11)

## 2020-03-05 PROCEDURE — 82306 VITAMIN D 25 HYDROXY: CPT | Performed by: NURSE PRACTITIONER

## 2020-03-05 PROCEDURE — 96375 TX/PRO/DX INJ NEW DRUG ADDON: CPT

## 2020-03-05 PROCEDURE — 25800030 ZZH RX IP 258 OP 636: Mod: ZF | Performed by: PEDIATRICS

## 2020-03-05 PROCEDURE — 25000128 H RX IP 250 OP 636: Mod: ZF

## 2020-03-05 PROCEDURE — 25000128 H RX IP 250 OP 636: Mod: ZF | Performed by: PEDIATRICS

## 2020-03-05 PROCEDURE — 96411 CHEMO IV PUSH ADDL DRUG: CPT

## 2020-03-05 PROCEDURE — 85025 COMPLETE CBC W/AUTO DIFF WBC: CPT | Performed by: NURSE PRACTITIONER

## 2020-03-05 PROCEDURE — 96409 CHEMO IV PUSH SNGL DRUG: CPT

## 2020-03-05 RX ORDER — HEPARIN SODIUM (PORCINE) LOCK FLUSH IV SOLN 100 UNIT/ML 100 UNIT/ML
5 SOLUTION INTRAVENOUS
Status: DISCONTINUED | OUTPATIENT
Start: 2020-03-05 | End: 2020-03-05 | Stop reason: HOSPADM

## 2020-03-05 RX ORDER — HEPARIN SODIUM (PORCINE) LOCK FLUSH IV SOLN 100 UNIT/ML 100 UNIT/ML
SOLUTION INTRAVENOUS
Status: COMPLETED
Start: 2020-03-05 | End: 2020-03-05

## 2020-03-05 RX ORDER — ONDANSETRON 2 MG/ML
INJECTION INTRAMUSCULAR; INTRAVENOUS
Status: COMPLETED
Start: 2020-03-05 | End: 2020-03-05

## 2020-03-05 RX ORDER — ONDANSETRON 2 MG/ML
8 INJECTION INTRAMUSCULAR; INTRAVENOUS ONCE
Status: COMPLETED | OUTPATIENT
Start: 2020-03-05 | End: 2020-03-05

## 2020-03-05 RX ADMIN — ONDANSETRON 8 MG: 2 INJECTION INTRAMUSCULAR; INTRAVENOUS at 14:18

## 2020-03-05 RX ADMIN — VINCRISTINE SULFATE 2 MG: 1 INJECTION, SOLUTION INTRAVENOUS at 14:36

## 2020-03-05 RX ADMIN — SODIUM CHLORIDE, PRESERVATIVE FREE 500 UNITS: 5 INJECTION INTRAVENOUS at 14:43

## 2020-03-05 RX ADMIN — SODIUM CHLORIDE 50 MG: 9 INJECTION, SOLUTION INTRAVENOUS at 14:25

## 2020-03-05 RX ADMIN — HEPARIN SODIUM (PORCINE) LOCK FLUSH IV SOLN 100 UNIT/ML 500 UNITS: 100 SOLUTION at 14:43

## 2020-03-05 ASSESSMENT — MIFFLIN-ST. JEOR: SCORE: 1912

## 2020-03-05 NOTE — PROGRESS NOTES
Infusion Nursing Note    Lazaro Lund Presents to [facility] today for: chemotherapy- C1 D15 VCR/ Doxo    Due to : T lymphoblastic lymphoma (H)    Intravenous Access/Labs:  Port accessed using sterile technique without issue.     Coping:   [facility] declined for port access.  Pt is adult.     Infusion Note: Following port access, baseline labs were ordered.  Pt was seen and assessed by Dr Fischer. Premedication of zofran given. Vincristine and Doxorubicin were both given by gravity.  Positive blood returns noted pre, mid, and post infusions.  VS remained stable throughout.  Following chemotherapy, port was flushed, heparin locked, and de-accessed.      Discharge Plan:   Patient verbalized understanding of discharge instructions. Pt left [facility] in stable condition at conclusion of appt.

## 2020-03-05 NOTE — LETTER
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 He is currently day 15 of Delayed Intensification.    With induction, he had a CRu, resolution of lymphadenopathy, but persistence of small pleural effusion (resolved after consolidation). Induction was complicated by development of extensive upper DVT complicated by edema and folliculitis preventing day 29 LP, and consolidation was complicated by severe PEG-asparaginase induced necrotizing pancreatitis, being monitored by Dr. Montgomery. As a result, PEG-asparaginase has been discontinued from his treatment regimen. He is otherwise well and tolerating his current cycle of chemo.    Plan  1. Labs today reviewed: Proceed with vincristine and doxorubicin and start second 7 day course of decadron   1. Reviewed anticipatory nausea management, constipation management, and importance of hydration  2. Following with Dr. Coronel for his pancreatitis: follow up CT in April  3. Continue efforts at smoking cessation; Continue nicotine patch - step 2 for 6 weeks (until 4/2/20) and then 6 weeks at step 3. Discussed marijuana use and that I would strongly recommend he avoid smoking marijuana, especially during Delayed Intensification due to risk for fungal infection. We will make a referral for enrollment with medical cannabis - perhaps non-inhalation forms will help him break his habit of smoking  4. Now off of anticoagulation: low threshold to repeat US if any signs of thrombus  5. Continue vitamin D: repeat level pending today  6. Day 29 induction LP that was deferred will be made up during maintenance therapy  1. Given significant spinal headache history will plan for IV caffeine following LPs in the future.   7. Given  pancreatitis and intermediate TPMT phenotype his 6MP dose was reduced for the 2nd half of Consolidation by 50%. TPMT and NUDT15 genotype is normal, plan for regular dose thiopurines moving forward   8. Recommend Lazaro have his eyes checked, however he should wait until he's been off steroids for at least a few weeks.  9. RTC on 3/12/20 for labs, d29 of DI to start on 3/19 pending counts    Signed,  Nicho Fischer   Pediatric Hematology/Oncology Fellow    Physician Attestation    I saw and evaluated the patient. I discussed with the fellow and agree with the findings and plan as documented in the fellow's note.     Reynaldo Campuzano MD  Pediatric Hematology/Oncology  [facility]    Primary Care Physician   Rodriguez Montoya    Interim History  Lazaro presents today for day 15 of delayed intensification    Lazaro is overall doing well at home. His appetite is good and he has not had fever, chest pain, shortness of breath, headache, abdominal pain, constipation, weakness. With regards to his vision, Lazaro has not noticed any changes. Lazaro continues to make efforts to stop smoking, and is using his nicotine patch. He has not smoked a cigarette since starting the nicotine patch, but he has replaced the habit of smoking cigarettes with the habit of smoking marijuana. He does not use marijuana for symptoms management - instead it is a behavioral motivation.     Lazaro is hoping to be able to work again, and he has verified with his insurance that this will not interfere with his benefits. 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     Oncology History  Lazaro Lund is a 19 year old young man with T-cell lymphoblastic lymphoma, pratt stage III. Lazaro presented with chronic cough with progressive orthopnea and was found to have an anterior mediastinal mass and malignant left-sided pleural  effusion.  A CT guided biopsy was obtained at [facility] and pathology was consistent with T-cell neoplasm.  He was admitted to [facility] oncology service 8/30 and started on treatment per [id number].  He had a pleural effusion that required a chest tube and a pericardial effusion that was not drained. His Induction was complicated by cardiac compression secondary to his mass leading to tamponade, this improved through out his hospital stay. His induction course was complicated by extensive bilateral UE DVTs for which anticoagulation was started. At end of induction, he had a CRu response (persistent effusion on imaging). Consolidation was complicated by the development of severe asparaginase induced pancreatitis. Due to this, asparaginase was omitted from his treatment plan. His end of consolidation scan showed resolved pleural effusion and no evidence of disease. He tolerated interim maintenance well, and during delayed intensification, his lovenox was discontinued following three months of therapy. He is now in delayed intensification.     Past Medical History    I have reviewed this patient's medical history and updated it with pertinent information if needed.   Past Medical History:   Diagnosis Date     Acute necrotizing pancreatitis 11/07/2019    attributed to asparaginase     Acute pancreatitis due to PEGaspariginase therapy  11/15/2019     DVT of upper extremity (deep vein thrombosis) (H) 09/26/2019    Bilateral      Edema of upper extremity 10/17/2019     Folliculitis 10/17/2019     Migraine 2006    have resolved     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       Past Surgical History   I have reviewed this patient's surgical history and updated it with pertinent information if needed.  Past Surgical History:   Procedure Laterality Date     BONE MARROW BIOPSY, BONE SPECIMEN, NEEDLE/TROCAR Left 9/1/2019    Procedure: BIOPSY, BONE MARROW;  Surgeon: Heather Lopez MD;  Location: [facility] OR     INSERT PICC LINE  N/A 8/31/2019    Procedure: INSERTION, PICC;  Surgeon: Michell Keith MD;  [facility]: [facility] OR     INSERT PORT VASCULAR ACCESS N/A 10/24/2019    Procedure: INSERTION, VASCULAR ACCESS PORT;  Surgeon: Silviano Martins MD;  [facility]: [facility] PEDS SEDATION      IR CHEST PORT PLACEMENT > 5 YRS OF AGE  10/24/2019     IR CHEST TUBE PLACEMENT NON-TUNNELLED LEFT  8/31/2019     IR PICC PLACEMENT > 5 YRS OF AGE  8/31/2019     IR PORT CHECK RIGHT  11/12/2019     SPINAL PUNCTURE,LUMBAR, INTRATHECAL CHEMO DELIVERY N/A 8/31/2019    Procedure: LUMBAR PUNCTURE, WITH INTRATHECAL CHEMOTHERAPY ADMINISTRATION;  Surgeon: Heather Lopez MD;  [facility] OR     SPINAL PUNCTURE,LUMBAR, INTRATHECAL CHEMO DELIVERY N/A 9/9/2019    Procedure: Lumbar Puncture With Intrathecal Chemo;  Surgeon: Alexi Hayes MD;  [facility] OR     SPINAL PUNCTURE,LUMBAR, INTRATHECAL CHEMO DELIVERY N/A 10/10/2019    Procedure: Lumbar puncture with IT Chemo (CD);  Surgeon: Reynaldo Campuzano MD;  [facility] P[facility]      SPINAL PUNCTURE,LUMBAR, INTRATHECAL CHEMO DELIVERY N/A 10/17/2019    Procedure: Lumbar puncture with IT Chemo (not CD);  Surgeon: Reynaldo Campuzano MD;  [facility]      SPINAL PUNCTURE,LUMBAR, INTRATHECAL CHEMO DELIVERY N/A 10/24/2019    Procedure: LUMBAR PUNCTURE, WITH INTRATHECAL CHEMOTHERAPY ADMINISTRATION;  Surgeon: Félix Van APRN CNP;  [facility]      SPINAL PUNCTURE,LUMBAR, INTRATHECAL CHEMO DELIVERY N/A 10/31/2019    Procedure: Lumbar puncture with IT Chemo (not CD);  Surgeon: Reynaldo Campuzano MD;  [facility]      SPINAL PUNCTURE,LUMBAR, INTRATHECAL CHEMO DELIVERY N/A 12/19/2019    Procedure: Lumbar puncture with IT Chem (CD);  Surgeon: Félix Van APRN CNP;  [facility]      SPINAL PUNCTURE,LUMBAR, INTRATHECAL CHEMO DELIVERY N/A 12/23/2019    Procedure: Lumbar puncture with IT Chem (CD);  Surgeon: Heather Lopez  MD Jamilah;  [facility]      SPINAL PUNCTURE,LUMBAR, INTRATHECAL CHEMO DELIVERY N/A 1/23/2020    Procedure: Lumbar puncture with IT Chem (CD);  Surgeon: Reynaldo Campuzano MD;  [facility]      SPINAL PUNCTURE,LUMBAR, INTRATHECAL CHEMO DELIVERY N/A 2/20/2020    Procedure: Lumbar puncture with intrathecal Chemotherapy (CD);  Surgeon: Reynaldo Campuzano MD;  [facility]      THORACENTESIS N/A 8/31/2019    Procedure: Thoracentesis;  Surgeon: Michell Keith MD;  [facility] OR       Medications   Current Outpatient Medications on File Prior to Visit   Medication Sig Dispense Refill     dexamethasone 2 MG PO tablet Take 11mg (5.5 tablets) in the AM and 10mg (5 tabs) in the PM on Days 1-7 and 15-21. 147 tablet 0     diphenhydrAMINE (BENADRYL) 25 MG capsule Take 1-2 capsules (25-50 mg) by mouth every 6 hours as needed (Breakthrough Nausea and Vomiting ) 30 capsule 1     nicotine 14 MG/24HR TD 24 hr patch Place 1 patch onto the skin every 24 hours 42 patch 0     ondansetron (ZOFRAN-ODT) 8 MG ODT tab Take 1 tablet (8 mg) by mouth every 8 hours as needed for nausea 20 tablet 6     pantoprazole 40 MG PO EC tablet Take 1 tablet (40 mg) by mouth every morning (before breakfast) 30 tablet 2     polyethylene glycol (MIRALAX/GLYCOLAX) packet Take 17 g by mouth daily 30 packet 0     scopolamine (TRANSDERM) 1 MG/3DAYS 72 hr patch Place 1 patch onto the skin every 72 hours 10 patch 3     sennosides (SENOKOT) 8.6 MG tablet Take 2 tablets by mouth 2 times daily as needed for constipation 60 each 1     sulfamethoxazole-trimethoprim (BACTRIM DS) 800-160 MG tablet Take 1 tablet by mouth Every Mon, Tues two times daily 16 tablet 11     Vitamin D, Cholecalciferol, 25 MCG (1000 UT) TABS Take 2 tablets (2,000 Units) by mouth daily 60 tablet 3     Allergies   Allergies   Allergen Reactions     Asparaginase Derivatives Other (See Comments)     Severe pancreatitis     No Known Drug  Allergies        Social History   I have updated and reviewed the following Social History Narrative:   Pediatric History   Patient Parents     JHONATHAN RODRIGUEZ (Mother)     AVNI RODRIGUEZ (Father)     Other Topics Concern     Not on file   Social History Narrative    Lives at home in [place] with Dad and Step-Mom. Biological mother is involved in his life. Has 4 step-siblings and 1 biological sibling. 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 Has completed high school. Currently, he wants to return to work.      Family History   I have reviewed this patient's family history and updated it with pertinent information if needed.   Family History   Problem Relation Age of Onset     No Known Problems Mother      No Known Problems Father      Asthma Brother      Thyroid Cancer Paternal Grandmother      Melanoma Paternal Aunt        Review of Systems   The 10 point Review of Systems is negative other than noted in the HPI or here.     Physical Exam   Vital Signs with Ranges  Temp:  [98.1  F (36.7  C)] 98.1  F (36.7  C)  Pulse:  [90] 90  Resp:  [18] 18  BP: (131)/(79) 131/79  SpO2:  [98 %] 98 %    General: Lazaro is alert, no distress  HEENT: Skull is atrauamatic and normocephalic.  Full head of hair, thinning. PERRLA, sclera are non icteric and not injected, EOM are intact. Nares are patent without drainage. Oropharynx clear, no plaques noted. Buccal mucosa and tongue clear. MMM.   Lymph:  Neck is supple without lymphadenopathy.  There is no supraclavicular, axillary or inguinal lymphadenopathy palpated.  Cardiovascular:  HR is normal with regular rhythm, no murmur.  Capillary refill is < 2 seconds. Right arm without edema or erythema.  No pitting edema.   Respiratory: No cough noted. Respirations are easy.  Lungs are clear to auscultation through out.  No crackles or wheezes.  Gastrointestinal:  BS  present in all quadrants.  Abdomen is soft and flat. No tenderness with palpation. No hepatosplenomegaly or mass.  Skin: No rashes, bruises or other skin lesions noted.  Neurological:  No focal deficits  Musculoskeletal:  Good strength and ROM in all extremities.  Strong dorsiflexion at ankles and great toes (5/5) bilaterally without any pain at the Achilles.    Data   Results for orders placed or performed in visit on 03/05/20 (from the past 24 hour(s))   CBC with platelets differential   Result Value Ref Range    WBC 4.7 4.0 - 11.0 10e9/L    RBC Count 4.24 (L) 4.4 - 5.9 10e12/L    Hemoglobin 12.8 (L) 13.3 - 17.7 g/dL    Hematocrit 39.5 (L) 40.0 - 53.0 %    MCV 93 78 - 100 fl    MCH 30.2 26.5 - 33.0 pg    MCHC 32.4 31.5 - 36.5 g/dL    RDW 12.4 10.0 - 15.0 %    Platelet Count 237 150 - 450 10e9/L    Diff Method Automated Method     % Neutrophils 73.3 %    % Lymphocytes 17.2 %    % Monocytes 7.0 %    % Eosinophils 1.5 %    % Basophils 0.6 %    % Immature Granulocytes 0.4 %    Nucleated RBCs 0 0 /100    Absolute Neutrophil 3.5 1.6 - 8.3 10e9/L    Absolute Lymphocytes 0.8 0.8 - 5.3 10e9/L    Absolute Monocytes 0.3 0.0 - 1.3 10e9/L    Absolute Eosinophils 0.1 0.0 - 0.7 10e9/L    Absolute Basophils 0.0 0.0 - 0.2 10e9/L    Abs Immature Granulocytes 0.0 0 - 0.4 10e9/L    Absolute Nucleated RBC 0.0        Nicho Fischer MD

## 2020-03-12 ENCOUNTER — INFUSION THERAPY VISIT (OUTPATIENT)
Dept: INFUSION THERAPY | Facility: CLINIC | Age: 20
End: 2020-03-12
Attending: NURSE PRACTITIONER
Payer: COMMERCIAL

## 2020-03-12 ENCOUNTER — OFFICE VISIT (OUTPATIENT)
Dept: PEDIATRIC HEMATOLOGY/ONCOLOGY | Facility: CLINIC | Age: 20
End: 2020-03-12
Attending: NURSE PRACTITIONER
Payer: COMMERCIAL

## 2020-03-12 VITALS
BODY MASS INDEX: 26.03 KG/M2 | OXYGEN SATURATION: 99 % | RESPIRATION RATE: 16 BRPM | HEART RATE: 90 BPM | WEIGHT: 196.43 LBS | HEIGHT: 73 IN | DIASTOLIC BLOOD PRESSURE: 73 MMHG | TEMPERATURE: 98.2 F | SYSTOLIC BLOOD PRESSURE: 126 MMHG

## 2020-03-12 DIAGNOSIS — C83.50 T LYMPHOBLASTIC LYMPHOMA (H): Primary | ICD-10-CM

## 2020-03-12 DIAGNOSIS — C83.50 T LYMPHOBLASTIC LYMPHOMA (H): ICD-10-CM

## 2020-03-12 LAB
ABO + RH BLD: NORMAL
ABO + RH BLD: NORMAL
BASOPHILS # BLD AUTO: 0 10E9/L (ref 0–0.2)
BASOPHILS NFR BLD AUTO: 0.1 %
BLD GP AB SCN SERPL QL: NORMAL
BLOOD BANK CMNT PATIENT-IMP: NORMAL
DIFFERENTIAL METHOD BLD: ABNORMAL
EOSINOPHIL # BLD AUTO: 0 10E9/L (ref 0–0.7)
EOSINOPHIL NFR BLD AUTO: 0.3 %
ERYTHROCYTE [DISTWIDTH] IN BLOOD BY AUTOMATED COUNT: 12.4 % (ref 10–15)
HCT VFR BLD AUTO: 35.8 % (ref 40–53)
HGB BLD-MCNC: 11.9 G/DL (ref 13.3–17.7)
IMM GRANULOCYTES # BLD: 0.2 10E9/L (ref 0–0.4)
IMM GRANULOCYTES NFR BLD: 2.2 %
LYMPHOCYTES # BLD AUTO: 0.5 10E9/L (ref 0.8–5.3)
LYMPHOCYTES NFR BLD AUTO: 7.3 %
MCH RBC QN AUTO: 31.2 PG (ref 26.5–33)
MCHC RBC AUTO-ENTMCNC: 33.2 G/DL (ref 31.5–36.5)
MCV RBC AUTO: 94 FL (ref 78–100)
MONOCYTES # BLD AUTO: 0.2 10E9/L (ref 0–1.3)
MONOCYTES NFR BLD AUTO: 2.7 %
NEUTROPHILS # BLD AUTO: 5.9 10E9/L (ref 1.6–8.3)
NEUTROPHILS NFR BLD AUTO: 87.4 %
NRBC # BLD AUTO: 0 10*3/UL
NRBC BLD AUTO-RTO: 0 /100
PLATELET # BLD AUTO: 252 10E9/L (ref 150–450)
RBC # BLD AUTO: 3.81 10E12/L (ref 4.4–5.9)
SPECIMEN EXP DATE BLD: NORMAL
WBC # BLD AUTO: 6.7 10E9/L (ref 4–11)

## 2020-03-12 PROCEDURE — 86850 RBC ANTIBODY SCREEN: CPT | Performed by: NURSE PRACTITIONER

## 2020-03-12 PROCEDURE — 86901 BLOOD TYPING SEROLOGIC RH(D): CPT | Performed by: NURSE PRACTITIONER

## 2020-03-12 PROCEDURE — 86900 BLOOD TYPING SEROLOGIC ABO: CPT | Performed by: NURSE PRACTITIONER

## 2020-03-12 PROCEDURE — 36591 DRAW BLOOD OFF VENOUS DEVICE: CPT

## 2020-03-12 PROCEDURE — 25000128 H RX IP 250 OP 636: Mod: ZF

## 2020-03-12 PROCEDURE — 85025 COMPLETE CBC W/AUTO DIFF WBC: CPT | Performed by: NURSE PRACTITIONER

## 2020-03-12 RX ORDER — HEPARIN SODIUM (PORCINE) LOCK FLUSH IV SOLN 100 UNIT/ML 100 UNIT/ML
SOLUTION INTRAVENOUS
Status: COMPLETED
Start: 2020-03-12 | End: 2020-03-12

## 2020-03-12 RX ORDER — HEPARIN SODIUM (PORCINE) LOCK FLUSH IV SOLN 100 UNIT/ML 100 UNIT/ML
5 SOLUTION INTRAVENOUS
Status: DISCONTINUED | OUTPATIENT
Start: 2020-03-12 | End: 2020-03-12 | Stop reason: HOSPADM

## 2020-03-12 RX ADMIN — HEPARIN 500 UNITS: 100 SYRINGE at 11:33

## 2020-03-12 RX ADMIN — HEPARIN SODIUM (PORCINE) LOCK FLUSH IV SOLN 100 UNIT/ML 500 UNITS: 100 SOLUTION at 11:33

## 2020-03-12 ASSESSMENT — PAIN SCALES - GENERAL: PAINLEVEL: NO PAIN (0)

## 2020-03-12 ASSESSMENT — MIFFLIN-ST. JEOR: SCORE: 1946

## 2020-03-12 NOTE — PROGRESS NOTES
Infusion Nursing Note    Lazaro Lund Presents to [facility] today for: possible transfusion    Due to : T lymphoblastic lymphoma (H)    Intravenous Access/Labs:  Port accessed by Clarisa Cai RN.     Coping:   Child Family Life declined    Infusion Note: Port accessed, labs drawn. , . Pt did not meet parameters for transfusion today. Port heparin locked and de accessed. Pt was seen by Luana Van NP while in clinic.     Discharge Plan:   Self verbalized understanding of discharge instructions.    Pt left [facility] in stable condition.

## 2020-03-12 NOTE — PROGRESS NOTES
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 Lazaro presented with acute onset cough and was found to have an anterior mediastinal mass and malignant left-sided pleural effusion.  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  He was admitted to [facility] oncology service 8/30 and started on treatment per [id number].  He had a pleural effusion that required a chest tube and a pericardial effusion that was not drained. His Induction was complicated by cardiac compression secondary to his mass leading to tamponade, this improved through out his hospital stay.  He also had dysphagia that improved with treatment of his mass, swallow study was normal. His course was recently complicated by extensive bilateral UE DVTs, he remains on anticoagulation.  Most recently his course was complicated by the development of severe asparaginase induced pancreatitis. He became quite ill with SIRS and associated hypotension, he required pressor support in the ICU.  Fortunately Lazaro recovered well and his subsequent imaging has continued to show improvement.  He comes to clinic today for Day 22 of Delayed Intensification.    HPI:   Lazaro comes to clinic today by himself. He reports he has been doing really well since his last visit.  Appetite has been strong on steroids.  No GI upset. He denies mucositis.  No constipation however he had a streak of blood in his stool a few days ago that has not recurred. No pain. No skin concerns.    Lazaro reports the nicotine patches are working well for him, he has not had a cigarette in 3 weeks.  He has not had fever.  Sleeping well at night. No paresthesias. Distance vision slightly blurry, has gotten a little worse while on steroids.    Review of systems:  Remainder of ROS is complete and negative.    PMH:   Past Medical History:   Diagnosis Date     Acute  necrotizing pancreatitis 11/07/2019    attributed to asparaginase     Acute pancreatitis due to PEGaspariginase therapy  11/15/2019     DVT of upper extremity (deep vein thrombosis) (H) 09/26/2019    Bilateral      Edema of upper extremity 10/17/2019     Folliculitis 10/17/2019     Migraine 2006    have resolved     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   Spinal HA following LP  TPMT shows Intermediate activity  Factor V leiden and prothrombin negative  Pancreatitis secondary to asparaginase  TPMT/NUDT15 genotype is normal.    PFMH:   Family History   Problem Relation Age of Onset     No Known Problems Mother      No Known Problems Father      Asthma Brother      Thyroid Cancer Paternal Grandmother      Melanoma Paternal Aunt        Social History: Lazaro previously lived at home with his dad and step mom in [place] but is now staying with his mom in [place].  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    Current Medications:  Current Outpatient Medications on File Prior to Visit   Medication Sig Dispense Refill     dexamethasone 2 MG PO tablet Take 11mg (5.5 tablets) in the AM and 10mg (5 tabs) in the PM on Days 1-7 and 15-21. 147 tablet 0     diphenhydrAMINE (BENADRYL) 25 MG capsule Take 1-2 capsules (25-50 mg) by mouth every 6 hours as needed (Breakthrough Nausea and Vomiting ) 30 capsule 1     nicotine 14 MG/24HR TD 24 hr patch Place 1 patch onto the skin every 24 hours 42 patch 0     ondansetron (ZOFRAN-ODT) 8 MG ODT tab Take 1 tablet (8 mg) by mouth every 8 hours as needed for nausea 20 tablet 6     pantoprazole 40 MG PO EC tablet Take 1 tablet (40 mg) by mouth every morning (before breakfast) 30 tablet 2     polyethylene glycol (MIRALAX/GLYCOLAX) packet Take 17 g by mouth daily 30 packet 0     scopolamine (TRANSDERM) 1 MG/3DAYS 72 hr patch Place 1 patch onto the skin every 72 hours 10 patch 3     sennosides (SENOKOT) 8.6 MG tablet Take 2 tablets by mouth 2 times daily as needed for  "constipation 60 each 1     sulfamethoxazole-trimethoprim (BACTRIM DS) 800-160 MG tablet Take 1 tablet by mouth Every Mon, Tues two times daily 16 tablet 11     Vitamin D, Cholecalciferol, 25 MCG (1000 UT) TABS Take 2 tablets (2,000 Units) by mouth daily 60 tablet 3   Lazaro reports he is taking bactrim, protonix and lovenox regularly.  No missed doses of dex, finished last evening.      Received influenza vaccine for the [id number] season.      Physical Exam:   Temp:  [98.2  F (36.8  C)] 98.2  F (36.8  C)  Pulse:  [90] 90  Resp:  [16] 16  BP: (126)/(73) 126/73  SpO2:  [99 %] 99 %  Wt Readings from Last 4 Encounters:   03/12/20 89.1 kg (196 lb 6.9 oz)   03/05/20 86.2 kg (190 lb 0.6 oz)   02/27/20 87.8 kg (193 lb 9 oz)   02/21/20 83.6 kg (184 lb 4.9 oz)     Ht Readings from Last 2 Encounters:   03/12/20 1.848 m (6' 0.76\")   03/05/20 1.84 m (6' 0.44\")     Lazaro feels his baseline weight was 180 lbs.  General: Lazaro is alert, interactive and appropriate. He appears well and is talkative.  HEENT: Skull is atrauamatic and normocephalic.  Full head of hair. PERRLA, sclera are non icteric and not injected, EOM are intact, gaze slightly disconjugate which is his baseline. Nares are patent without drainage. Oropharynx clear, no plaques noted. Buccal mucosa and tongue clear. MMM. Tympanic membranes are opaque bilaterally with light reflex and landmarks present.  Voice no longer hoarse.  Lymph:  Neck is supple without lymphadenopathy.  There is no supraclavicular, axillary or inguinal lymphadenopathy palpated.  Cardiovascular:  HR is regular, S1, S2 no murmur.  Capillary refill is < 2 seconds. Right arm without edema or erythema.  No pitting edema.  Cap refill < 2 sec. Peripheral pulses 2+/=. He has mildly distended veins noted on the left upper chest, not extending up to the neck, overlying the pectoralis.   Respiratory: No cough noted. Respirations are easy.  Lungs are clear to auscultation through out.  No crackles or " wheezes.  Gastrointestinal:  BS present in all quadrants.  Abdomen is soft and flat.  Lazaro denies LUQ discomfort, no pain with palpation. No hepatosplenomegaly or masses are palpated.  Skin: Lesion resolved in the left post auricular area.  No other skin lesions noted.  Neurological:  CN 2-12 grossly intact. Gait is normal.  No issues with balance. Sensation intact in hands and feet.     Musculoskeletal:  Good strength and ROM in all extremities.  Strong dorsiflexion at ankles and great toes (5/5) bilaterally without any pain at the Achilles.    Labs:   Results for orders placed or performed in visit on 03/12/20   CBC with platelets differential     Status: Abnormal   Result Value Ref Range    WBC 6.7 4.0 - 11.0 10e9/L    RBC Count 3.81 (L) 4.4 - 5.9 10e12/L    Hemoglobin 11.9 (L) 13.3 - 17.7 g/dL    Hematocrit 35.8 (L) 40.0 - 53.0 %    MCV 94 78 - 100 fl    MCH 31.2 26.5 - 33.0 pg    MCHC 33.2 31.5 - 36.5 g/dL    RDW 12.4 10.0 - 15.0 %    Platelet Count 252 150 - 450 10e9/L    Diff Method Automated Method     % Neutrophils 87.4 %    % Lymphocytes 7.3 %    % Monocytes 2.7 %    % Eosinophils 0.3 %    % Basophils 0.1 %    % Immature Granulocytes 2.2 %    Nucleated RBCs 0 0 /100    Absolute Neutrophil 5.9 1.6 - 8.3 10e9/L    Absolute Lymphocytes 0.5 (L) 0.8 - 5.3 10e9/L    Absolute Monocytes 0.2 0.0 - 1.3 10e9/L    Absolute Eosinophils 0.0 0.0 - 0.7 10e9/L    Absolute Basophils 0.0 0.0 - 0.2 10e9/L    Abs Immature Granulocytes 0.2 0 - 0.4 10e9/L    Absolute Nucleated RBC 0.0    Results for orders placed or performed in visit on 03/12/20   ABO/Rh type and screen     Status: None (In process)   Result Value Ref Range    ABO PENDING     Antibody Screen PENDING     Test Valid Only At          [facility]    Specimen Expires 03/15/2020        Assessment:  Lazaro Lund is a 19 year old young man with T cell lymphoblastic lymphoma (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  He is being treated  per [facility] protocol [id number].   He's had a CRu following Induction with a CR at the end of Consolidation. His course has been complicated by extensive bilateral DVT and severe pancreatitis.  He completed treatment with lovenox. Recent abdominal CT shows continued improvement in regards to the sequelae from his pancreatitis.  Asparaginase will be permanently discontinued from his treatment regimen. He is on Vit D for deficiency.  He comes to clinic today for Day 22 of Delayed Intensification.  Blood counts look good. He recently quit smoking.  Mild blurry vision.    Plan:   1) Reviewed labs with Lazaro, no transfusions needed.   2) Continue off lovenox. Low threshold to repeat U/S if any signs of thrombus.   3) Lazaro will have a repeat CT at the end of April per Dr Coronel. His last note indicates he would like to follow him with imaging until he has complete resolution.    4) Continue Vit D 3 2000IU daily, repeat level at 19, continue current dose.  Recheck level in 6 months.  5) Day 29 Induction LP deferred, original plan was to make up on Day 29 of Consolidation however given his recent complications we decided to defer this to Maintenance therapy.  6) TPMT and NUDT15 genotype is normal, plan for regular dose thiopurines.  7) Given significant spinal headache history will plan for IV caffeine following LPs in the future.   8) Recommend Lazaro have his eyes checked, however he should wait until he's been off steroids for at least a few weeks.  9) Lazaro is doing well with smoking cessation, he has adequate nicotine patches, continue to follow.  Discussed marijuana use and that I would strongly recommend he avoid using marijuana, especially during Delayed Intensification due to risk for fungal infection.  10) RTC in one week for Day 29 therapy.  Contacted [facility] today, Lazaro is reopened to care.  Will contact them again next week if/when he makes counts.  Have confirmed with pharmacy that 6TG is covered and can be  filled here.

## 2020-03-12 NOTE — LETTER
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 Lazaro presented with acute onset cough and was found to have an anterior mediastinal mass and malignant left-sided pleural effusion.  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  He was admitted to [facility] oncology service 8/30 and started on treatment per [id number].  He had a pleural effusion that required a chest tube and a pericardial effusion that was not drained. His Induction was complicated by cardiac compression secondary to his mass leading to tamponade, this improved through out his hospital stay.  He also had dysphagia that improved with treatment of his mass, swallow study was normal. His course was recently complicated by extensive bilateral UE DVTs, he remains on anticoagulation.  Most recently his course was complicated by the development of severe asparaginase induced pancreatitis. He became quite ill with SIRS and associated hypotension, he required pressor support in the ICU.  Fortunately Lazaro recovered well and his subsequent imaging has continued to show improvement.  He comes to clinic today for Day 22 of Delayed Intensification.    HPI:   Lazaro comes to clinic today by himself. He reports he has been doing really well since his last visit.  Appetite has been strong on steroids.  No GI upset. He denies mucositis.  No constipation however he had a streak of blood in his stool a few days ago that has not recurred. No pain. No skin concerns.    Lazaro reports the nicotine patches are working well for him, he has not had a cigarette in 3 weeks.  He has not had fever.  Sleeping well at night. No paresthesias. Distance vision slightly blurry, has gotten a little worse while on steroids.    Review of systems:  Remainder of ROS is  complete and negative.    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    PFMH:   Family History   Problem Relation Age of Onset     No Known Problems Mother      No Known Problems Father      Asthma Brother      Thyroid Cancer Paternal Grandmother      Melanoma Paternal Aunt        Social History: Laazro previously lived at home with his dad and step mom in [place] but is now staying with his mom in [place].  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    Current Medications:  Current Outpatient Medications on File Prior to Visit   Medication Sig Dispense Refill     dexamethasone 2 MG PO tablet Take 11mg (5.5 tablets) in the AM and 10mg (5 tabs) in the PM on Days 1-7 and 15-21. 147 tablet 0     diphenhydrAMINE (BENADRYL) 25 MG capsule Take 1-2 capsules (25-50 mg) by mouth every 6 hours as needed (Breakthrough Nausea and Vomiting ) 30 capsule 1     nicotine 14 MG/24HR TD 24 hr patch Place 1 patch onto the skin every 24 hours 42 patch 0     ondansetron (ZOFRAN-ODT) 8 MG ODT tab Take 1 tablet (8 mg) by mouth every 8 hours as needed for nausea 20 tablet 6     pantoprazole 40 MG PO EC tablet Take 1 tablet (40 mg) by mouth every morning (before breakfast) 30 tablet 2     polyethylene glycol (MIRALAX/GLYCOLAX) packet Take 17 g by mouth daily 30 packet 0     scopolamine (TRANSDERM) 1 MG/3DAYS 72 hr patch Place 1 patch onto the skin every 72 hours 10 patch 3      "sennosides (SENOKOT) 8.6 MG tablet Take 2 tablets by mouth 2 times daily as needed for constipation 60 each 1     sulfamethoxazole-trimethoprim (BACTRIM DS) 800-160 MG tablet Take 1 tablet by mouth Every Mon, Tues two times daily 16 tablet 11     Vitamin D, Cholecalciferol, 25 MCG (1000 UT) TABS Take 2 tablets (2,000 Units) by mouth daily 60 tablet 3   Lazaro reports he is taking bactrim, protonix and lovenox regularly.  No missed doses of dex, finished last evening.      Received influenza vaccine for the 5453-2620 season.      Physical Exam:   Temp:  [98.2  F (36.8  C)] 98.2  F (36.8  C)  Pulse:  [90] 90  Resp:  [16] 16  BP: (126)/(73) 126/73  SpO2:  [99 %] 99 %  Wt Readings from Last 4 Encounters:   03/12/20 89.1 kg (196 lb 6.9 oz)   03/05/20 86.2 kg (190 lb 0.6 oz)   02/27/20 87.8 kg (193 lb 9 oz)   02/21/20 83.6 kg (184 lb 4.9 oz)     Ht Readings from Last 2 Encounters:   03/12/20 1.848 m (6' 0.76\")   03/05/20 1.84 m (6' 0.44\")     Lazaro feels his baseline weight was 180 lbs.  General: Lazaro is alert, interactive and appropriate. He appears well and is talkative.  HEENT: Skull is atrauamatic and normocephalic.  Full head of hair. PERRLA, sclera are non icteric and not injected, EOM are intact, gaze slightly disconjugate which is his baseline. Nares are patent without drainage. Oropharynx clear, no plaques noted. Buccal mucosa and tongue clear. MMM. Tympanic membranes are opaque bilaterally with light reflex and landmarks present.  Voice no longer hoarse.  Lymph:  Neck is supple without lymphadenopathy.  There is no supraclavicular, axillary or inguinal lymphadenopathy palpated.  Cardiovascular:  HR is regular, S1, S2 no murmur.  Capillary refill is < 2 seconds. Right arm without edema or erythema.  No pitting edema.  Cap refill < 2 sec. Peripheral pulses 2+/=. He has mildly distended veins noted on the left upper chest, not extending up to the neck, overlying the pectoralis.   Respiratory: No cough noted. " Respirations are easy.  Lungs are clear to auscultation through out.  No crackles or wheezes.  Gastrointestinal:  BS present in all quadrants.  Abdomen is soft and flat.  Lazaro denies LUQ discomfort, no pain with palpation. No hepatosplenomegaly or masses are palpated.  Skin: Lesion resolved in the left post auricular area.  No other skin lesions noted.  Neurological:  CN 2-12 grossly intact. Gait is normal.  No issues with balance. Sensation intact in hands and feet.     Musculoskeletal:  Good strength and ROM in all extremities.  Strong dorsiflexion at ankles and great toes (5/5) bilaterally without any pain at the Achilles.    Labs:   Results for orders placed or performed in visit on 03/12/20   CBC with platelets differential     Status: Abnormal   Result Value Ref Range    WBC 6.7 4.0 - 11.0 10e9/L    RBC Count 3.81 (L) 4.4 - 5.9 10e12/L    Hemoglobin 11.9 (L) 13.3 - 17.7 g/dL    Hematocrit 35.8 (L) 40.0 - 53.0 %    MCV 94 78 - 100 fl    MCH 31.2 26.5 - 33.0 pg    MCHC 33.2 31.5 - 36.5 g/dL    RDW 12.4 10.0 - 15.0 %    Platelet Count 252 150 - 450 10e9/L    Diff Method Automated Method     % Neutrophils 87.4 %    % Lymphocytes 7.3 %    % Monocytes 2.7 %    % Eosinophils 0.3 %    % Basophils 0.1 %    % Immature Granulocytes 2.2 %    Nucleated RBCs 0 0 /100    Absolute Neutrophil 5.9 1.6 - 8.3 10e9/L    Absolute Lymphocytes 0.5 (L) 0.8 - 5.3 10e9/L    Absolute Monocytes 0.2 0.0 - 1.3 10e9/L    Absolute Eosinophils 0.0 0.0 - 0.7 10e9/L    Absolute Basophils 0.0 0.0 - 0.2 10e9/L    Abs Immature Granulocytes 0.2 0 - 0.4 10e9/L    Absolute Nucleated RBC 0.0    Results for orders placed or performed in visit on 03/12/20   ABO/Rh type and screen     Status: None (In process)   Result Value Ref Range    ABO PENDING     Antibody Screen PENDING     Test Valid Only At          [facility]    Specimen Expires 03/15/2020        Assessment:  Lazaro Lund is a 19 year old young  man with T cell lymphoblastic lymphoma (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  He is being treated per [facility] protocol [id number].   He's had a CRu following Induction with a CR at the end of Consolidation. His course has been complicated by extensive bilateral DVT and severe pancreatitis.  He completed treatment with lovenox. Recent abdominal CT shows continued improvement in regards to the sequelae from his pancreatitis.  Asparaginase will be permanently discontinued from his treatment regimen. He is on Vit D for deficiency.  He comes to clinic today for Day 22 of Delayed Intensification.  Blood counts look good. He recently quit smoking.  Mild blurry vision.    Plan:   1) Reviewed labs with Lazaro, no transfusions needed.   2) Continue off lovenox. Low threshold to repeat U/S if any signs of thrombus.   3) Lazaro will have a repeat CT at the end of April per Dr Coronel. His last note indicates he would like to follow him with imaging until he has complete resolution.    4) Continue Vit D 3 2000IU daily, repeat level at 19, continue current dose.  Recheck level in 6 months.  5) Day 29 Induction LP deferred, original plan was to make up on Day 29 of Consolidation however given his recent complications we decided to defer this to Maintenance therapy.  6) TPMT and NUDT15 genotype is normal, plan for regular dose thiopurines.  7) Given significant spinal headache history will plan for IV caffeine following LPs in the future.   8) Recommend Lazaro have his eyes checked, however he should wait until he's been off steroids for at least a few weeks.  9) Lazaro is doing well with smoking cessation, he has adequate nicotine patches, continue to follow.  Discussed marijuana use and that I would strongly recommend he avoid using marijuana, especially during Delayed Intensification due to risk for fungal infection.  10) RTC in one week for Day 29 therapy.  Contacted [facility] today, Lazaro is reopened to care.  Will contact them again next week  if/when he makes counts.  Have confirmed with pharmacy that 6TG is covered and can be filled here.       YOHAN Dunn CNP

## 2020-03-18 ENCOUNTER — TELEPHONE (OUTPATIENT)
Dept: PEDIATRIC HEMATOLOGY/ONCOLOGY | Facility: CLINIC | Age: 20
End: 2020-03-18

## 2020-03-18 NOTE — TELEPHONE ENCOUNTER
Called patient and left a voicemail to complete wellness screening. Please give wellness screening if patient calls back.  Xenia Joe, CMA

## 2020-03-19 ENCOUNTER — INFUSION THERAPY VISIT (OUTPATIENT)
Dept: INFUSION THERAPY | Facility: CLINIC | Age: 20
End: 2020-03-19
Attending: PEDIATRICS
Payer: COMMERCIAL

## 2020-03-19 ENCOUNTER — OFFICE VISIT (OUTPATIENT)
Dept: PEDIATRIC HEMATOLOGY/ONCOLOGY | Facility: CLINIC | Age: 20
End: 2020-03-19
Attending: PEDIATRICS
Payer: COMMERCIAL

## 2020-03-19 ENCOUNTER — HOSPITAL ENCOUNTER (OUTPATIENT)
Facility: CLINIC | Age: 20
Discharge: HOME | End: 2020-03-19
Attending: PEDIATRICS | Admitting: PEDIATRICS
Payer: COMMERCIAL

## 2020-03-19 ENCOUNTER — ANESTHESIA (OUTPATIENT)
Dept: PEDIATRICS | Facility: CLINIC | Age: 20
End: 2020-03-19
Payer: COMMERCIAL

## 2020-03-19 ENCOUNTER — HOME INFUSION (PRE-WILLOW HOME INFUSION) (OUTPATIENT)
Dept: PHARMACY | Facility: CLINIC | Age: 20
End: 2020-03-19

## 2020-03-19 ENCOUNTER — ANESTHESIA EVENT (OUTPATIENT)
Dept: PEDIATRICS | Facility: CLINIC | Age: 20
End: 2020-03-19
Payer: COMMERCIAL

## 2020-03-19 VITALS
HEART RATE: 66 BPM | DIASTOLIC BLOOD PRESSURE: 65 MMHG | TEMPERATURE: 97.6 F | RESPIRATION RATE: 18 BRPM | SYSTOLIC BLOOD PRESSURE: 115 MMHG | OXYGEN SATURATION: 99 %

## 2020-03-19 VITALS
TEMPERATURE: 98.1 F | BODY MASS INDEX: 25.77 KG/M2 | WEIGHT: 194.45 LBS | HEART RATE: 72 BPM | DIASTOLIC BLOOD PRESSURE: 71 MMHG | SYSTOLIC BLOOD PRESSURE: 113 MMHG | HEIGHT: 73 IN | OXYGEN SATURATION: 100 %

## 2020-03-19 DIAGNOSIS — C83.50 T LYMPHOBLASTIC LYMPHOMA (H): Primary | ICD-10-CM

## 2020-03-19 DIAGNOSIS — C83.50 T LYMPHOBLASTIC LYMPHOMA (H): ICD-10-CM

## 2020-03-19 LAB
ALBUMIN SERPL-MCNC: 3.3 G/DL (ref 3.4–5)
ALP SERPL-CCNC: 57 U/L (ref 40–150)
ALT SERPL W P-5'-P-CCNC: 34 U/L (ref 0–70)
ANION GAP SERPL CALCULATED.3IONS-SCNC: 4 MMOL/L (ref 3–14)
AST SERPL W P-5'-P-CCNC: 16 U/L (ref 0–45)
BASOPHILS # BLD AUTO: 0 10E9/L (ref 0–0.2)
BASOPHILS NFR BLD AUTO: 0.3 %
BILIRUB SERPL-MCNC: 0.2 MG/DL (ref 0.2–1.3)
BUN SERPL-MCNC: 21 MG/DL (ref 7–30)
CALCIUM SERPL-MCNC: 8.3 MG/DL (ref 8.5–10.1)
CHLORIDE SERPL-SCNC: 109 MMOL/L (ref 94–109)
CO2 SERPL-SCNC: 28 MMOL/L (ref 20–32)
CREAT SERPL-MCNC: 0.82 MG/DL (ref 0.66–1.25)
DIFFERENTIAL METHOD BLD: ABNORMAL
EOSINOPHIL # BLD AUTO: 0 10E9/L (ref 0–0.7)
EOSINOPHIL NFR BLD AUTO: 0.9 %
ERYTHROCYTE [DISTWIDTH] IN BLOOD BY AUTOMATED COUNT: 13 % (ref 10–15)
GFR SERPL CREATININE-BSD FRML MDRD: >90 ML/MIN/{1.73_M2}
GLUCOSE SERPL-MCNC: 92 MG/DL (ref 70–99)
HCT VFR BLD AUTO: 36.5 % (ref 40–53)
HGB BLD-MCNC: 12.1 G/DL (ref 13.3–17.7)
IMM GRANULOCYTES # BLD: 0 10E9/L (ref 0–0.4)
IMM GRANULOCYTES NFR BLD: 0.6 %
LYMPHOCYTES # BLD AUTO: 1 10E9/L (ref 0.8–5.3)
LYMPHOCYTES NFR BLD AUTO: 28.6 %
MCH RBC QN AUTO: 31.1 PG (ref 26.5–33)
MCHC RBC AUTO-ENTMCNC: 33.2 G/DL (ref 31.5–36.5)
MCV RBC AUTO: 94 FL (ref 78–100)
MONOCYTES # BLD AUTO: 0.5 10E9/L (ref 0–1.3)
MONOCYTES NFR BLD AUTO: 14.9 %
NEUTROPHILS # BLD AUTO: 1.9 10E9/L (ref 1.6–8.3)
NEUTROPHILS NFR BLD AUTO: 54.7 %
NRBC # BLD AUTO: 0 10*3/UL
NRBC BLD AUTO-RTO: 0 /100
PLATELET # BLD AUTO: 189 10E9/L (ref 150–450)
POTASSIUM SERPL-SCNC: 4.2 MMOL/L (ref 3.4–5.3)
PROT SERPL-MCNC: 5.9 G/DL (ref 6.8–8.8)
RBC # BLD AUTO: 3.89 10E12/L (ref 4.4–5.9)
SODIUM SERPL-SCNC: 141 MMOL/L (ref 133–144)
WBC # BLD AUTO: 3.5 10E9/L (ref 4–11)

## 2020-03-19 PROCEDURE — 25800030 ZZH RX IP 258 OP 636: Mod: ZF

## 2020-03-19 PROCEDURE — 25000125 ZZHC RX 250

## 2020-03-19 PROCEDURE — 25000128 H RX IP 250 OP 636: Mod: ZF

## 2020-03-19 PROCEDURE — 25000128 H RX IP 250 OP 636

## 2020-03-19 PROCEDURE — 37000008 ZZH ANESTHESIA TECHNICAL FEE, 1ST 30 MIN: Performed by: PEDIATRICS

## 2020-03-19 PROCEDURE — 96361 HYDRATE IV INFUSION ADD-ON: CPT

## 2020-03-19 PROCEDURE — 85025 COMPLETE CBC W/AUTO DIFF WBC: CPT | Performed by: PEDIATRICS

## 2020-03-19 PROCEDURE — 25000128 H RX IP 250 OP 636: Mod: JW | Performed by: PEDIATRICS

## 2020-03-19 PROCEDURE — 25800030 ZZH RX IP 258 OP 636: Performed by: PEDIATRICS

## 2020-03-19 PROCEDURE — 36415 COLL VENOUS BLD VENIPUNCTURE: CPT | Performed by: PEDIATRICS

## 2020-03-19 PROCEDURE — 96375 TX/PRO/DX INJ NEW DRUG ADDON: CPT

## 2020-03-19 PROCEDURE — 40000165 ZZH STATISTIC POST-PROCEDURE RECOVERY CARE: Performed by: PEDIATRICS

## 2020-03-19 PROCEDURE — 89050 BODY FLUID CELL COUNT: CPT | Performed by: PEDIATRICS

## 2020-03-19 PROCEDURE — 25000125 ZZHC RX 250: Performed by: PEDIATRICS

## 2020-03-19 PROCEDURE — 96413 CHEMO IV INFUSION 1 HR: CPT

## 2020-03-19 PROCEDURE — 40001011 ZZH STATISTIC PRE-PROCEDURE NURSING ASSESSMENT: Performed by: PEDIATRICS

## 2020-03-19 PROCEDURE — 96450 CHEMOTHERAPY INTO CNS: CPT | Performed by: PEDIATRICS

## 2020-03-19 PROCEDURE — 25000128 H RX IP 250 OP 636: Mod: ZF | Performed by: PEDIATRICS

## 2020-03-19 PROCEDURE — 80053 COMPREHEN METABOLIC PANEL: CPT | Performed by: PEDIATRICS

## 2020-03-19 PROCEDURE — 25000128 H RX IP 250 OP 636: Performed by: NURSE ANESTHETIST, CERTIFIED REGISTERED

## 2020-03-19 PROCEDURE — 25800030 ZZH RX IP 258 OP 636: Performed by: NURSE ANESTHETIST, CERTIFIED REGISTERED

## 2020-03-19 PROCEDURE — 96365 THER/PROPH/DIAG IV INF INIT: CPT | Performed by: PEDIATRICS

## 2020-03-19 PROCEDURE — 25800030 ZZH RX IP 258 OP 636: Mod: ZF | Performed by: PEDIATRICS

## 2020-03-19 PROCEDURE — 96411 CHEMO IV PUSH ADDL DRUG: CPT

## 2020-03-19 RX ORDER — LIDOCAINE 40 MG/G
2.5 CREAM TOPICAL
Status: COMPLETED | OUTPATIENT
Start: 2020-03-19 | End: 2020-03-19

## 2020-03-19 RX ORDER — FENTANYL CITRATE 50 UG/ML
INJECTION, SOLUTION INTRAMUSCULAR; INTRAVENOUS PRN
Status: DISCONTINUED | OUTPATIENT
Start: 2020-03-19 | End: 2020-03-19

## 2020-03-19 RX ORDER — LIDOCAINE 40 MG/G
CREAM TOPICAL
Status: COMPLETED
Start: 2020-03-19 | End: 2020-03-19

## 2020-03-19 RX ORDER — CYTARABINE 100 MG/ML
75 INJECTION, SOLUTION INTRATHECAL; INTRAVENOUS; SUBCUTANEOUS DAILY
Qty: 4.5 ML | Refills: 0 | Status: SHIPPED | OUTPATIENT
Start: 2020-03-20 | End: 2020-04-16

## 2020-03-19 RX ORDER — SODIUM CHLORIDE, SODIUM LACTATE, POTASSIUM CHLORIDE, CALCIUM CHLORIDE 600; 310; 30; 20 MG/100ML; MG/100ML; MG/100ML; MG/100ML
INJECTION, SOLUTION INTRAVENOUS CONTINUOUS PRN
Status: DISCONTINUED | OUTPATIENT
Start: 2020-03-19 | End: 2020-03-19

## 2020-03-19 RX ORDER — ONDANSETRON 2 MG/ML
8 INJECTION INTRAMUSCULAR; INTRAVENOUS ONCE
Status: COMPLETED | OUTPATIENT
Start: 2020-03-19 | End: 2020-03-19

## 2020-03-19 RX ORDER — HEPARIN SODIUM (PORCINE) LOCK FLUSH IV SOLN 100 UNIT/ML 100 UNIT/ML
SOLUTION INTRAVENOUS
Status: COMPLETED
Start: 2020-03-19 | End: 2020-03-19

## 2020-03-19 RX ORDER — PROPOFOL 10 MG/ML
INJECTION, EMULSION INTRAVENOUS PRN
Status: DISCONTINUED | OUTPATIENT
Start: 2020-03-19 | End: 2020-03-19

## 2020-03-19 RX ORDER — PROPOFOL 10 MG/ML
INJECTION, EMULSION INTRAVENOUS CONTINUOUS PRN
Status: DISCONTINUED | OUTPATIENT
Start: 2020-03-19 | End: 2020-03-19

## 2020-03-19 RX ORDER — SODIUM CHLORIDE 9 MG/ML
INJECTION, SOLUTION INTRAVENOUS CONTINUOUS
Status: ACTIVE | OUTPATIENT
Start: 2020-03-19 | End: 2020-03-19

## 2020-03-19 RX ORDER — HEPARIN SODIUM,PORCINE 10 UNIT/ML
5 VIAL (ML) INTRAVENOUS ONCE
Status: DISCONTINUED | OUTPATIENT
Start: 2020-03-19 | End: 2020-03-19 | Stop reason: HOSPADM

## 2020-03-19 RX ORDER — SODIUM CHLORIDE 9 MG/ML
INJECTION, SOLUTION INTRAVENOUS
Status: COMPLETED
Start: 2020-03-19 | End: 2020-03-19

## 2020-03-19 RX ORDER — ONDANSETRON 2 MG/ML
INJECTION INTRAMUSCULAR; INTRAVENOUS
Status: COMPLETED
Start: 2020-03-19 | End: 2020-03-19

## 2020-03-19 RX ORDER — ONDANSETRON 2 MG/ML
INJECTION INTRAMUSCULAR; INTRAVENOUS PRN
Status: DISCONTINUED | OUTPATIENT
Start: 2020-03-19 | End: 2020-03-19

## 2020-03-19 RX ORDER — CYTARABINE 20 MG/ML
156 INJECTION, SOLUTION INTRATHECAL; INTRAVENOUS; SUBCUTANEOUS ONCE
Status: COMPLETED | OUTPATIENT
Start: 2020-03-19 | End: 2020-03-19

## 2020-03-19 RX ADMIN — ONDANSETRON 8 MG: 2 INJECTION INTRAMUSCULAR; INTRAVENOUS at 10:13

## 2020-03-19 RX ADMIN — SODIUM CHLORIDE: 9 INJECTION, SOLUTION INTRAVENOUS at 08:05

## 2020-03-19 RX ADMIN — PROPOFOL 70 MG: 10 INJECTION, EMULSION INTRAVENOUS at 14:38

## 2020-03-19 RX ADMIN — PROPOFOL 150 MCG/KG/MIN: 10 INJECTION, EMULSION INTRAVENOUS at 14:36

## 2020-03-19 RX ADMIN — LIDOCAINE 2.5 G: 40 CREAM TOPICAL at 12:25

## 2020-03-19 RX ADMIN — CYCLOPHOSPHAMIDE 2000 MG: 2 INJECTION, POWDER, FOR SOLUTION INTRAVENOUS; ORAL at 10:39

## 2020-03-19 RX ADMIN — HEPARIN 500 UNITS: 100 SYRINGE at 15:20

## 2020-03-19 RX ADMIN — ONDANSETRON 4 MG: 2 INJECTION INTRAMUSCULAR; INTRAVENOUS at 14:33

## 2020-03-19 RX ADMIN — PROPOFOL 100 MG: 10 INJECTION, EMULSION INTRAVENOUS at 14:36

## 2020-03-19 RX ADMIN — METHOTREXATE: 25 INJECTION INTRA-ARTERIAL; INTRAMUSCULAR; INTRATHECAL; INTRAVENOUS at 14:49

## 2020-03-19 RX ADMIN — SODIUM CHLORIDE, POTASSIUM CHLORIDE, SODIUM LACTATE AND CALCIUM CHLORIDE: 600; 310; 30; 20 INJECTION, SOLUTION INTRAVENOUS at 14:33

## 2020-03-19 RX ADMIN — CAFFEINE AND SODIUM BENZOATE 500 MG: 125 INJECTION, SOLUTION INTRAMUSCULAR; INTRAVENOUS at 13:40

## 2020-03-19 RX ADMIN — FENTANYL CITRATE 25 MCG: 50 INJECTION, SOLUTION INTRAMUSCULAR; INTRAVENOUS at 14:39

## 2020-03-19 RX ADMIN — CYTARABINE 156 MG: 20 INJECTION, SOLUTION INTRATHECAL; INTRAVENOUS; SUBCUTANEOUS at 10:29

## 2020-03-19 ASSESSMENT — ENCOUNTER SYMPTOMS
SEIZURES: 0
APNEA: 0

## 2020-03-19 ASSESSMENT — MIFFLIN-ST. JEOR: SCORE: 1933.26

## 2020-03-19 NOTE — ANESTHESIA CARE TRANSFER NOTE
Patient: Lazaro Lund    Procedure(s):  Lumbar puncture with intrathecal Chemotherapy (CD)    Diagnosis: Lymphoma (H) [C85.90]  Diagnosis Additional Information: No value filed.    Anesthesia Type:   No value filed.     Note:  Airway :Nasal Cannula  Patient transferred to:PS Recovery  Comments: Patient transferred back to peds sedation recovery on nasal cannula at 2 liters per minute. VSS upon arrival, respirations WNL. Report to RN and questions answered.    YOHAN Toribio CRNA on 3/19/2020 at 2:57 PM    Handoff Report: Identifed the Patient, Identified the Reponsible Provider, Reviewed the pertinent medical history, Discussed the surgical course, Reviewed Intra-OP anesthesia mangement and issues during anesthesia, Set expectations for post-procedure period and Allowed opportunity for questions and acknowledgement of understanding      Vitals: (Last set prior to Anesthesia Care Transfer)    CRNA VITALS  3/19/2020 1422 - 3/19/2020 1457      3/19/2020             NIBP:  101/42    Pulse:  66    NIBP Mean:  70    Temp:  36.7  C (98.1  F)    SpO2:  99 %    Resp Rate (observed):  15                Electronically Signed By: YOHAN Toribio CRNA  March 19, 2020  2:57 PM

## 2020-03-19 NOTE — PROGRESS NOTES
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 He is currently day 29 of Delayed Intensification. With induction, he had a CRu, resolution of lymphadenopathy, but persistence of small pleural effusion (resolved after consolidation). Induction was complicated by development of extensive upper DVT complicated by edema (FVL and PTII negative) and folliculitis preventing day 29 LP, and consolidation was complicated by severe PEG-asparaginase induced necrotizing pancreatitis, being monitored by Dr. Montgomery. As a result, PEG-asparaginase has been discontinued from his treatment regimen. Today, he is feeling well and based on exam and labs, is ready to proceed with therapy for this cycle.    Plan  1. Labs today reviewed: Proceed with LP, cyclophosphamide, cytarabine. Start 14 day course of Tabloid. Called [facility] to verify home cytarabine administration.  1. Reviewed anticipatory nausea management, constipation management, fever management, and importance of hydration. Continue bactrim for PJP ppx.  2. Given significant spinal headache history will plan for IV caffeine following LPs   2. Following with Dr. Coronel for his pancreatitis: follow up CT in April (4/27)  3. Continue efforts at smoking cessation; Continue nicotine patch - step 2 for 6 weeks (until 4/2/20) and then 6 weeks at step 3. Discussed marijuana use and that I would strongly recommend he avoid smoking marijuana, especially during Delayed Intensification due to risk for fungal infection.  4. Now off of anticoagulation: low threshold to repeat US if any signs of thrombus  5. Continue vitamin D: repeat level in September  6. After completion of DI, will start maintenance chemotherapy  1. Will make up day 29 induction LP during cycle 5 of maintenance  2. TPMT and NUDT15 genotype  is normal, plan for regular dose thiopurines  7. Recommend Lazaro have his eyes checked, however he should wait until he's been off steroids for at least a few weeks.  8. RTC on 3/26/20 for labs, d36 of DI with LP, cytarabine, continuing tabloid until day 42    Signed,  Nicho Fischer   Pediatric Hematology/Oncology Fellow    Physician Attestation    I saw and evaluated the patient. I discussed with the fellow and agree with the findings and plan as documented in the fellow's note.     Reynaldo Campuzano MD  Pediatric Hematology/Oncology  [facility]    Primary Care Physician   Rodriguez Montoya    Interim History  Lazaro presents today for day 29 of delayed intensification    Lazaro is overall doing well at home. His appetite is good and he has not had fever, chest pain, shortness of breath, headache, abdominal pain, constipation, weakness. With regards to his vision, Lazaro denies blurriness now that he is off steroids. Lazaro continues to make efforts to stop smoking, and is using his nicotine patch. He has not smoked a cigarette since starting the nicotine patch, but he replaced the habit of smoking cigarettes with smoking marijuana instead, out of habit but not for specific symptom management. Over the last two weeks, Lazaro has been smoking prior to bed but states he is trying to wean himself off. He is still interested in medical cannabis for non-inhalation alternatives. Lazaro is sleeping well and denies paresthesias.     Oncology History  Lazaro Lund is a 19 year old young man with T-cell lymphoblastic lymphoma, pratt stage III. Lazaro presented with chronic cough with progressive orthopnea and was found to have an anterior mediastinal mass and malignant left-sided pleural effusion.  A CT guided biopsy was obtained at [facility] and pathology was consistent with T-cell neoplasm.  He was admitted to [facility] oncology service 8/30 and started on treatment per [id number].  He  had a pleural effusion that required a chest tube and a pericardial effusion that was not drained. His Induction was complicated by cardiac compression secondary to his mass leading to tamponade, this improved through out his hospital stay. His induction course was complicated by extensive bilateral UE DVTs for which anticoagulation was started. At end of induction, he had a CRu response (persistent effusion on imaging). Consolidation was complicated by the development of severe asparaginase induced pancreatitis requiring pressor support in the ICU. Due to this, asparaginase was omitted from his treatment plan. Given pancreatitis and intermediate TPMT phenotype his 6MP dose was reduced for the 2nd half of Consolidation by 50%. Subsequently, TPMT and NUDT15 genotype were found to be normal. His end of consolidation scan showed resolved pleural effusion and no evidence of disease. He tolerated interim maintenance well, and during delayed intensification, his lovenox was discontinued following three months of therapy. He is now in delayed intensification.     Past Medical History    I have reviewed this patient's medical history and updated it with pertinent information if needed.   Past Medical History:   Diagnosis Date     Acute necrotizing pancreatitis 11/07/2019    attributed to asparaginase     Acute pancreatitis due to PEGaspariginase therapy  11/15/2019     DVT of upper extremity (deep vein thrombosis) (H) 09/26/2019    Bilateral      Edema of upper extremity 10/17/2019     Folliculitis 10/17/2019     Migraine 2006    have resolved     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       Past Surgical History   I have reviewed this patient's surgical history and updated it with pertinent information if needed.  Past Surgical History:   Procedure Laterality Date     BONE MARROW BIOPSY, BONE SPECIMEN, NEEDLE/TROCAR Left 9/1/2019    Procedure: BIOPSY, BONE MARROW;  Surgeon: Heather Lopez MD;  [facility]      INSERT PICC LINE N/A 8/31/2019    Procedure: INSERTION, PICC;  Surgeon: Michell Keith MD;  [facility] OR     INSERT PORT VASCULAR ACCESS N/A 10/24/2019    Procedure: INSERTION, VASCULAR ACCESS PORT;  Surgeon: Silviano Martins MD;  [facility]: [facility] PEDS SEDATION      IR CHEST PORT PLACEMENT > 5 YRS OF AGE  10/24/2019     IR CHEST TUBE PLACEMENT NON-TUNNELLED LEFT  8/31/2019     IR PICC PLACEMENT > 5 YRS OF AGE  8/31/2019     IR PORT CHECK RIGHT  11/12/2019     SPINAL PUNCTURE,LUMBAR, INTRATHECAL CHEMO DELIVERY N/A 8/31/2019    Procedure: LUMBAR PUNCTURE, WITH INTRATHECAL CHEMOTHERAPY ADMINISTRATION;  Surgeon: Heather Lopez MD;  [facility]     SPINAL PUNCTURE,LUMBAR, INTRATHECAL CHEMO DELIVERY N/A 9/9/2019    Procedure: Lumbar Puncture With Intrathecal Chemo;  Surgeon: Alexi Hayes MD;  [facility]     SPINAL PUNCTURE,LUMBAR, INTRATHECAL CHEMO DELIVERY N/A 10/10/2019    Procedure: Lumbar puncture with IT Chemo (CD);  Surgeon: Reynaldo Campuzano MD;  [facility]      SPINAL PUNCTURE,LUMBAR, INTRATHECAL CHEMO DELIVERY N/A 10/17/2019    Procedure: Lumbar puncture with IT Chemo (not CD);  Surgeon: Reynaldo Campuzano MD;  [facility]      SPINAL PUNCTURE,LUMBAR, INTRATHECAL CHEMO DELIVERY N/A 10/24/2019    Procedure: LUMBAR PUNCTURE, WITH INTRATHECAL CHEMOTHERAPY ADMINISTRATION;  Surgeon: Félix Van APRN CNP;  [facility]      SPINAL PUNCTURE,LUMBAR, INTRATHECAL CHEMO DELIVERY N/A 10/31/2019    Procedure: Lumbar puncture with IT Chemo (not CD);  Surgeon: Reynaldo Campuzano MD;  [facility]      SPINAL PUNCTURE,LUMBAR, INTRATHECAL CHEMO DELIVERY N/A 12/19/2019    Procedure: Lumbar puncture with IT Chem (CD);  Surgeon: Félix Van APRN CNP;  [facility]      SPINAL PUNCTURE,LUMBAR, INTRATHECAL CHEMO DELIVERY N/A 12/23/2019    Procedure: Lumbar puncture with IT Chem (CD);  Surgeon:  Heather Lopez MD;  [facility]      SPINAL PUNCTURE,LUMBAR, INTRATHECAL CHEMO DELIVERY N/A 1/23/2020    Procedure: Lumbar puncture with IT Chem (CD);  Surgeon: Reynaldo Campuzano MD;  [facility]      SPINAL PUNCTURE,LUMBAR, INTRATHECAL CHEMO DELIVERY N/A 2/20/2020    Procedure: Lumbar puncture with intrathecal Chemotherapy (CD);  Surgeon: Reynaldo Campuzano MD;  [facility]      THORACENTESIS N/A 8/31/2019    Procedure: Thoracentesis;  Surgeon: Michell Keith MD;  [facility]       Medications   Current Outpatient Medications on File Prior to Visit   Medication Sig Dispense Refill     diphenhydrAMINE (BENADRYL) 25 MG capsule Take 1-2 capsules (25-50 mg) by mouth every 6 hours as needed (Breakthrough Nausea and Vomiting ) 30 capsule 1     nicotine 14 MG/24HR TD 24 hr patch Place 1 patch onto the skin every 24 hours 42 patch 0     ondansetron (ZOFRAN-ODT) 8 MG ODT tab Take 1 tablet (8 mg) by mouth every 8 hours as needed for nausea 20 tablet 6     pantoprazole 40 MG PO EC tablet Take 1 tablet (40 mg) by mouth every morning (before breakfast) 30 tablet 2     polyethylene glycol (MIRALAX/GLYCOLAX) packet Take 17 g by mouth daily 30 packet 0     scopolamine (TRANSDERM) 1 MG/3DAYS 72 hr patch Place 1 patch onto the skin every 72 hours 10 patch 3     sennosides (SENOKOT) 8.6 MG tablet Take 2 tablets by mouth 2 times daily as needed for constipation 60 each 1     sulfamethoxazole-trimethoprim (BACTRIM DS) 800-160 MG tablet Take 1 tablet by mouth Every Mon, Tues two times daily 16 tablet 11     Vitamin D, Cholecalciferol, 25 MCG (1000 UT) TABS Take 2 tablets (2,000 Units) by mouth daily 60 tablet 3     Allergies   Allergies   Allergen Reactions     Asparaginase Derivatives Other (See Comments)     Severe pancreatitis     No Known Drug Allergies        Social History   I have updated and reviewed the following Social History Narrative:   Pediatric History    Patient Parents     JHONATHAN RODRIGUEZ (Mother)     AVNI RODRIGUEZ (Father)     Other Topics Concern     Not on file   Social History Narrative    Lives at home in [place] with Dad and Step-Mom. Biological mother is involved in his life. Has 4 step-siblings and 1 biological sibling. 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 Has completed high school. Currently, he wants to return to work and is working a few hours intermittently at manufacturing job.      Family History   I have reviewed this patient's family history and updated it with pertinent information if needed.   Family History   Problem Relation Age of Onset     No Known Problems Mother      No Known Problems Father      Asthma Brother      Thyroid Cancer Paternal Grandmother      Melanoma Paternal Aunt        Review of Systems   The 10 point Review of Systems is negative other than noted in the HPI or here.     Physical Exam   Vital Signs with Ranges  Temp:  [97.9  F (36.6  C)-98.1  F (36.7  C)] 98.1  F (36.7  C)  Pulse:  [72-77] 72  BP: (112-113)/(62-71) 113/71  SpO2:  [96 %-100 %] 100 %    General: Lazaro is alert, no distress  HEENT: Skull is atrauamatic and normocephalic.  Full head of hair, thinning. PERRLA, sclera are non icteric and not injected, EOM are intact. Nares are patent without drainage. Oropharynx clear, no plaques noted. Buccal mucosa and tongue clear. MMM.   Lymph:  Neck is supple without lymphadenopathy.  There is no supraclavicular, axillary or inguinal lymphadenopathy palpated.  Cardiovascular:  HR is normal with regular rhythm, no murmur.  Capillary refill is < 2 seconds. Right arm without edema or erythema.  No pitting edema.   Respiratory: No cough noted. Respirations are easy.  Lungs are clear to auscultation through out.  No crackles or wheezes.  Gastrointestinal:  BS present in all quadrants.  Abdomen is soft  and flat. No tenderness with palpation. No hepatosplenomegaly or mass.  Skin: No rashes, bruises or other skin lesions noted.  Neurological:  No focal deficits  Musculoskeletal:  Good strength and ROM in all extremities.  Strong dorsiflexion at ankles and great toes (5/5) bilaterally without any pain at the Achilles.    Data   Results for orders placed or performed in visit on 03/19/20 (from the past 24 hour(s))   CBC with platelets differential   Result Value Ref Range    WBC 3.5 (L) 4.0 - 11.0 10e9/L    RBC Count 3.89 (L) 4.4 - 5.9 10e12/L    Hemoglobin 12.1 (L) 13.3 - 17.7 g/dL    Hematocrit 36.5 (L) 40.0 - 53.0 %    MCV 94 78 - 100 fl    MCH 31.1 26.5 - 33.0 pg    MCHC 33.2 31.5 - 36.5 g/dL    RDW 13.0 10.0 - 15.0 %    Platelet Count 189 150 - 450 10e9/L    Diff Method Automated Method     % Neutrophils 54.7 %    % Lymphocytes 28.6 %    % Monocytes 14.9 %    % Eosinophils 0.9 %    % Basophils 0.3 %    % Immature Granulocytes 0.6 %    Nucleated RBCs 0 0 /100    Absolute Neutrophil 1.9 1.6 - 8.3 10e9/L    Absolute Lymphocytes 1.0 0.8 - 5.3 10e9/L    Absolute Monocytes 0.5 0.0 - 1.3 10e9/L    Absolute Eosinophils 0.0 0.0 - 0.7 10e9/L    Absolute Basophils 0.0 0.0 - 0.2 10e9/L    Abs Immature Granulocytes 0.0 0 - 0.4 10e9/L    Absolute Nucleated RBC 0.0    Comprehensive metabolic panel   Result Value Ref Range    Sodium 141 133 - 144 mmol/L    Potassium 4.2 3.4 - 5.3 mmol/L    Chloride 109 94 - 109 mmol/L    Carbon Dioxide 28 20 - 32 mmol/L    Anion Gap 4 3 - 14 mmol/L    Glucose 92 70 - 99 mg/dL    Urea Nitrogen 21 7 - 30 mg/dL    Creatinine 0.82 0.66 - 1.25 mg/dL    GFR Estimate >90 >60 mL/min/[1.73_m2]    GFR Estimate If Black >90 >60 mL/min/[1.73_m2]    Calcium 8.3 (L) 8.5 - 10.1 mg/dL    Bilirubin Total 0.2 0.2 - 1.3 mg/dL    Albumin 3.3 (L) 3.4 - 5.0 g/dL    Protein Total 5.9 (L) 6.8 - 8.8 g/dL    Alkaline Phosphatase 57 40 - 150 U/L    ALT 34 0 - 70 U/L    AST 16 0 - 45 U/L       Procedure:  3/19/2020  Therapeutic LP  A Lumbar Puncture was performed in the Pediatric Sedation Suite. Informed consent was obtained prior to the procedure. Lazaro Lund was identified by facial recognition and ID arm band. A time-out was performed. Lazaro Lund was then placed in the left lateral decubitus position and the lumbosacral area was sterily prepped using Chloraprep followed by drape placement. Anatomic landmarks were identified by palpation. Then, a 22 gauge, 3.5 inch spinal needle was easily inserted into the L4-L5 interspace. On the first attempt approximately 2 mL of clear and colorless cerebrospinal fluid was obtained to be sent to the lab for cell count analysis and cytospin. Following that, 15mg of intrathecal Methotrexate in 6 mL of preservative-free normal saline was infused without resistance. The needle was removed and a Band-Aid applied. Lazaro Lund tolerated this procedure very well.  Signed,  Nicho Fischer   Pediatric Hematology/Oncology Fellow    Physician Attestation   I personally observed as the fellow performed the lumbar puncture with intrathecal chemotherapy administration.    Reynaldo Campuzano

## 2020-03-19 NOTE — DISCHARGE INSTRUCTIONS
Home Instructions for Your Child after Sedation  Today your child received (medicine):  Propofol, Fentanyl and Zofran  Please keep this form with your health records  Your child may be more sleepy and irritable today than normal. Also, an adult should stay with your child for the rest of the day. The medicine may make the child dizzy. Avoid activities that require balance (bike riding, skating, climbing stairs, walking).  Remember:    When your child wants to eat again, start with liquids (juice, soda pop, Popsicles). If your child feels well enough, you may try a regular diet. It is best to offer light meals for the first 24 hours.    If your child has nausea (feels sick to the stomach) or vomiting (throws up), give small amounts of clear liquids (7-Up, Sprite, apple juice or broth). Fluids are more important than food until your child is feeling better.    Wait 24 hours before giving medicine that contains alcohol. This includes liquid cold, cough and allergy medicines (Robitussin, Vicks Formula 44 for children, Benadryl, Chlor-Trimeton).    If you will leave your child with a , give the sitter a copy of these instructions.  Call your doctor if:    You have questions about the test results.    Your child vomits (throws up) more than two times.    Your child is very fussy or irritable.    You have trouble waking your child.     If your child has trouble breathing, call 911.  If you have any questions or concerns, please call:  Pediatric Sedation Unit [phone]  Pediatric clinic  [phone]  [facility]  [phone] (ask for the Pediatric Anesthesiologist doctor on call)  Emergency department [phone]  [facility] toll-free number [phone] (Monday--Friday, 8 a.m. to 4:30 p.m.)  I understand these instructions. I have all of my personal belongings.      [facility]   [phone]    Care post Lumbar Puncture     Do not remove bandage/dressing for 24 hours -- after this time  they can be removed    No bath, shower or soaking of the dressing for 24 hours    Activity as tolerated by the patient    Diet as able to tolerated    May use Tylenol as needed for pain control -- DO NOT use Ibuprofen    Can apply icepack to the site for discomfort -- no more than 10 minutes at a time    If bleeding presents apply pressure for 5 minutes    Call [phone] ask for Peds BMT/Hem/Onc fellow on call if complications arise including:    persistent bleeding    fever greater than 100.5    Pain    Lumbar punctures can cause headache. If the pain is not controlled with Tylenol (acetaminophen) please call the Peds BMT/Hem/Onc fellow on call

## 2020-03-19 NOTE — LETTER
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 He is currently day 29 of Delayed Intensification. With induction, he had a CRu, resolution of lymphadenopathy, but persistence of small pleural effusion (resolved after consolidation). Induction was complicated by development of extensive upper DVT complicated by edema (FVL and PTII negative) and folliculitis preventing day 29 LP, and consolidation was complicated by severe PEG-asparaginase induced necrotizing pancreatitis, being monitored by Dr. Montgomery. As a result, PEG-asparaginase has been discontinued from his treatment regimen. Today, he is feeling well and based on exam and labs, is ready to proceed with therapy for this cycle.    Plan  1. Labs today reviewed: Proceed with LP, cyclophosphamide, cytarabine. Start 14 day course of Tabloid. Called [facility] to verify home cytarabine administration.  1. Reviewed anticipatory nausea management, constipation management, fever management, and importance of hydration. Continue bactrim for PJP ppx.  2. Given significant spinal headache history will plan for IV caffeine following LPs   2. Following with Dr. Coronel for his pancreatitis: follow up CT in April (4/27)  3. Continue efforts at smoking cessation; Continue nicotine patch - step 2 for 6 weeks (until 4/2/20) and then 6 weeks at step 3. Discussed marijuana use and that I would strongly recommend he avoid smoking marijuana, especially during Delayed Intensification due to risk for fungal infection.  4. Now off of anticoagulation: low threshold to repeat US if any signs of thrombus  5. Continue vitamin D: repeat level in September  6. After completion of DI, will start maintenance chemotherapy  1. Will make up  day 29 induction LP during cycle 5 of maintenance  2. TPMT and NUDT15 genotype is normal, plan for regular dose thiopurines  7. Recommend Lazaro have his eyes checked, however he should wait until he's been off steroids for at least a few weeks.  8. RTC on 3/26/20 for labs, d36 of DI with LP, cytarabine, continuing tabloid until day 42    Signed,  Nicho Fischer   Pediatric Hematology/Oncology Fellow    Physician Attestation    I saw and evaluated the patient. I discussed with the fellow and agree with the findings and plan as documented in the fellow's note.     Reynaldo Campuzano MD  Pediatric Hematology/Oncology  [facility]    Primary Care Physician   Rodriguez Montoya    Interim History  Lazaro presents today for day 29 of delayed intensification    Lazaro is overall doing well at home. His appetite is good and he has not had fever, chest pain, shortness of breath, headache, abdominal pain, constipation, weakness. With regards to his vision, Lazaro denies blurriness now that he is off steroids. Lazaro continues to make efforts to stop smoking, and is using his nicotine patch. He has not smoked a cigarette since starting the nicotine patch, but he replaced the habit of smoking cigarettes with smoking marijuana instead, out of habit but not for specific symptom management. Over the last two weeks, Lazaro has been smoking prior to bed but states he is trying to wean himself off. He is still interested in medical cannabis for non-inhalation alternatives. Lazaro is sleeping well and denies paresthesias.     Oncology History  Lazaro Lund is a 19 year old young man with T-cell lymphoblastic lymphoma, pratt stage III. Lazaro presented with chronic cough with progressive orthopnea and was found to have an anterior mediastinal mass and malignant left-sided pleural effusion.  A CT guided biopsy was obtained at [facility] and pathology was consistent with T-cell neoplasm.  He was  admitted to [facility] oncology service 8/30 and started on treatment per [id number].  He had a pleural effusion that required a chest tube and a pericardial effusion that was not drained. His Induction was complicated by cardiac compression secondary to his mass leading to tamponade, this improved through out his hospital stay. His induction course was complicated by extensive bilateral UE DVTs for which anticoagulation was started. At end of induction, he had a CRu response (persistent effusion on imaging). Consolidation was complicated by the development of severe asparaginase induced pancreatitis requiring pressor support in the ICU. Due to this, asparaginase was omitted from his treatment plan. Given pancreatitis and intermediate TPMT phenotype his 6MP dose was reduced for the 2nd half of Consolidation by 50%. Subsequently, TPMT and NUDT15 genotype were found to be normal. His end of consolidation scan showed resolved pleural effusion and no evidence of disease. He tolerated interim maintenance well, and during delayed intensification, his lovenox was discontinued following three months of therapy. He is now in delayed intensification.     Past Medical History    I have reviewed this patient's medical history and updated it with pertinent information if needed.   Past Medical History:   Diagnosis Date     Acute necrotizing pancreatitis 11/07/2019    attributed to asparaginase     Acute pancreatitis due to PEGaspariginase therapy  11/15/2019     DVT of upper extremity (deep vein thrombosis) (H) 09/26/2019    Bilateral      Edema of upper extremity 10/17/2019     Folliculitis 10/17/2019     Migraine 2006    have resolved     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       Past Surgical History   I have reviewed this patient's surgical history and updated it with pertinent information if needed.  Past Surgical History:   Procedure Laterality Date     BONE MARROW BIOPSY, BONE SPECIMEN, NEEDLE/TROCAR Left 9/1/2019     Procedure: BIOPSY, BONE MARROW;  Surgeon: Heather Lopez MD;  Location: [facility] OR     INSERT PICC LINE N/A 8/31/2019    Procedure: INSERTION, PICC;  Surgeon: Michell Keith MD;  [facility] OR     INSERT PORT VASCULAR ACCESS N/A 10/24/2019    Procedure: INSERTION, VASCULAR ACCESS PORT;  Surgeon: Silviano Martins MD;  [facility]: [facility] PEDS SEDATION      IR CHEST PORT PLACEMENT > 5 YRS OF AGE  10/24/2019     IR CHEST TUBE PLACEMENT NON-TUNNELLED LEFT  8/31/2019     IR PICC PLACEMENT > 5 YRS OF AGE  8/31/2019     IR PORT CHECK RIGHT  11/12/2019     SPINAL PUNCTURE,LUMBAR, INTRATHECAL CHEMO DELIVERY N/A 8/31/2019    Procedure: LUMBAR PUNCTURE, WITH INTRATHECAL CHEMOTHERAPY ADMINISTRATION;  Surgeon: Heather Lopez MD;  [facility]     SPINAL PUNCTURE,LUMBAR, INTRATHECAL CHEMO DELIVERY N/A 9/9/2019    Procedure: Lumbar Puncture With Intrathecal Chemo;  Surgeon: Alexi Hayes MD;  [facility]     SPINAL PUNCTURE,LUMBAR, INTRATHECAL CHEMO DELIVERY N/A 10/10/2019    Procedure: Lumbar puncture with IT Chemo (CD);  Surgeon: Reynaldo Campuazno MD;  [facility]      SPINAL PUNCTURE,LUMBAR, INTRATHECAL CHEMO DELIVERY N/A 10/17/2019    Procedure: Lumbar puncture with IT Chemo (not CD);  Surgeon: Reynaldo Campuzano MD;  [facility]      SPINAL PUNCTURE,LUMBAR, INTRATHECAL CHEMO DELIVERY N/A 10/24/2019    Procedure: LUMBAR PUNCTURE, WITH INTRATHECAL CHEMOTHERAPY ADMINISTRATION;  Surgeon: Félix Van APRN CNP;  [facility]      SPINAL PUNCTURE,LUMBAR, INTRATHECAL CHEMO DELIVERY N/A 10/31/2019    Procedure: Lumbar puncture with IT Chemo (not CD);  Surgeon: Reynaldo Campuzano MD;  [facility]      SPINAL PUNCTURE,LUMBAR, INTRATHECAL CHEMO DELIVERY N/A 12/19/2019    Procedure: Lumbar puncture with IT Chem (CD);  Surgeon: Félix Van APRN CNP;  [facility]      SPINAL PUNCTURE,LUMBAR, INTRATHECAL  CHEMO DELIVERY N/A 12/23/2019    Procedure: Lumbar puncture with IT Chem (CD);  Surgeon: Heather Lopez MD;  [facility]      SPINAL PUNCTURE,LUMBAR, INTRATHECAL CHEMO DELIVERY N/A 1/23/2020    Procedure: Lumbar puncture with IT Chem (CD);  Surgeon: Reynaldo Campuzano MD;  [facility]      SPINAL PUNCTURE,LUMBAR, INTRATHECAL CHEMO DELIVERY N/A 2/20/2020    Procedure: Lumbar puncture with intrathecal Chemotherapy (CD);  Surgeon: Reynaldo Campuzano MD;  [facility]      THORACENTESIS N/A 8/31/2019    Procedure: Thoracentesis;  Surgeon: Michell Keith MD;  [facility]       Medications   Current Outpatient Medications on File Prior to Visit   Medication Sig Dispense Refill     diphenhydrAMINE (BENADRYL) 25 MG capsule Take 1-2 capsules (25-50 mg) by mouth every 6 hours as needed (Breakthrough Nausea and Vomiting ) 30 capsule 1     nicotine 14 MG/24HR TD 24 hr patch Place 1 patch onto the skin every 24 hours 42 patch 0     ondansetron (ZOFRAN-ODT) 8 MG ODT tab Take 1 tablet (8 mg) by mouth every 8 hours as needed for nausea 20 tablet 6     pantoprazole 40 MG PO EC tablet Take 1 tablet (40 mg) by mouth every morning (before breakfast) 30 tablet 2     polyethylene glycol (MIRALAX/GLYCOLAX) packet Take 17 g by mouth daily 30 packet 0     scopolamine (TRANSDERM) 1 MG/3DAYS 72 hr patch Place 1 patch onto the skin every 72 hours 10 patch 3     sennosides (SENOKOT) 8.6 MG tablet Take 2 tablets by mouth 2 times daily as needed for constipation 60 each 1     sulfamethoxazole-trimethoprim (BACTRIM DS) 800-160 MG tablet Take 1 tablet by mouth Every Mon, Tues two times daily 16 tablet 11     Vitamin D, Cholecalciferol, 25 MCG (1000 UT) TABS Take 2 tablets (2,000 Units) by mouth daily 60 tablet 3     Allergies   Allergies   Allergen Reactions     Asparaginase Derivatives Other (See Comments)     Severe pancreatitis     No Known Drug Allergies        Social History   I  have updated and reviewed the following Social History Narrative:   Pediatric History   Patient Parents     JHONATHAN RODRIGUEZ (Mother)     AVNI RODRIGUEZ (Father)     Other Topics Concern     Not on file   Social History Narrative    Lives at home in [place] with Dad and Step-Mom. Biological mother is involved in his life. Has 4 step-siblings and 1 biological sibling. 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 Has completed high school. Currently, he wants to return to work and is working a few hours intermittently at manufacturing job.      Family History   I have reviewed this patient's family history and updated it with pertinent information if needed.   Family History   Problem Relation Age of Onset     No Known Problems Mother      No Known Problems Father      Asthma Brother      Thyroid Cancer Paternal Grandmother      Melanoma Paternal Aunt        Review of Systems   The 10 point Review of Systems is negative other than noted in the HPI or here.     Physical Exam   Vital Signs with Ranges  Temp:  [97.9  F (36.6  C)-98.1  F (36.7  C)] 98.1  F (36.7  C)  Pulse:  [72-77] 72  BP: (112-113)/(62-71) 113/71  SpO2:  [96 %-100 %] 100 %    General: Lazaro is alert, no distress  HEENT: Skull is atrauamatic and normocephalic.  Full head of hair, thinning. PERRLA, sclera are non icteric and not injected, EOM are intact. Nares are patent without drainage. Oropharynx clear, no plaques noted. Buccal mucosa and tongue clear. MMM.   Lymph:  Neck is supple without lymphadenopathy.  There is no supraclavicular, axillary or inguinal lymphadenopathy palpated.  Cardiovascular:  HR is normal with regular rhythm, no murmur.  Capillary refill is < 2 seconds. Right arm without edema or erythema.  No pitting edema.   Respiratory: No cough noted. Respirations are easy.  Lungs are clear to auscultation through out.  No  crackles or wheezes.  Gastrointestinal:  BS present in all quadrants.  Abdomen is soft and flat. No tenderness with palpation. No hepatosplenomegaly or mass.  Skin: No rashes, bruises or other skin lesions noted.  Neurological:  No focal deficits  Musculoskeletal:  Good strength and ROM in all extremities.  Strong dorsiflexion at ankles and great toes (5/5) bilaterally without any pain at the Achilles.    Data   Results for orders placed or performed in visit on 03/19/20 (from the past 24 hour(s))   CBC with platelets differential   Result Value Ref Range    WBC 3.5 (L) 4.0 - 11.0 10e9/L    RBC Count 3.89 (L) 4.4 - 5.9 10e12/L    Hemoglobin 12.1 (L) 13.3 - 17.7 g/dL    Hematocrit 36.5 (L) 40.0 - 53.0 %    MCV 94 78 - 100 fl    MCH 31.1 26.5 - 33.0 pg    MCHC 33.2 31.5 - 36.5 g/dL    RDW 13.0 10.0 - 15.0 %    Platelet Count 189 150 - 450 10e9/L    Diff Method Automated Method     % Neutrophils 54.7 %    % Lymphocytes 28.6 %    % Monocytes 14.9 %    % Eosinophils 0.9 %    % Basophils 0.3 %    % Immature Granulocytes 0.6 %    Nucleated RBCs 0 0 /100    Absolute Neutrophil 1.9 1.6 - 8.3 10e9/L    Absolute Lymphocytes 1.0 0.8 - 5.3 10e9/L    Absolute Monocytes 0.5 0.0 - 1.3 10e9/L    Absolute Eosinophils 0.0 0.0 - 0.7 10e9/L    Absolute Basophils 0.0 0.0 - 0.2 10e9/L    Abs Immature Granulocytes 0.0 0 - 0.4 10e9/L    Absolute Nucleated RBC 0.0    Comprehensive metabolic panel   Result Value Ref Range    Sodium 141 133 - 144 mmol/L    Potassium 4.2 3.4 - 5.3 mmol/L    Chloride 109 94 - 109 mmol/L    Carbon Dioxide 28 20 - 32 mmol/L    Anion Gap 4 3 - 14 mmol/L    Glucose 92 70 - 99 mg/dL    Urea Nitrogen 21 7 - 30 mg/dL    Creatinine 0.82 0.66 - 1.25 mg/dL    GFR Estimate >90 >60 mL/min/[1.73_m2]    GFR Estimate If Black >90 >60 mL/min/[1.73_m2]    Calcium 8.3 (L) 8.5 - 10.1 mg/dL    Bilirubin Total 0.2 0.2 - 1.3 mg/dL    Albumin 3.3 (L) 3.4 - 5.0 g/dL    Protein Total 5.9 (L) 6.8 - 8.8 g/dL    Alkaline Phosphatase 57 40  - 150 U/L    ALT 34 0 - 70 U/L    AST 16 0 - 45 U/L       Procedure:  3/19/2020 Therapeutic LP  A Lumbar Puncture was performed in the Pediatric Sedation Suite. Informed consent was obtained prior to the procedure. Lazaro Lund was identified by facial recognition and ID arm band. A time-out was performed. Lazaro Lund was then placed in the left lateral decubitus position and the lumbosacral area was sterily prepped using Chloraprep followed by drape placement. Anatomic landmarks were identified by palpation. Then, a 22 gauge, 3.5 inch spinal needle was easily inserted into the L4-L5 interspace. On the first attempt approximately 2 mL of clear and colorless cerebrospinal fluid was obtained to be sent to the lab for cell count analysis and cytospin. Following that, 15mg of intrathecal Methotrexate in 6 mL of preservative-free normal saline was infused without resistance. The needle was removed and a Band-Aid applied. Lazaro Lund tolerated this procedure very well.  Signed,  Nicho Fischer   Pediatric Hematology/Oncology Fellow    Physician Attestation   I personally observed as the fellow performed the lumbar puncture with intrathecal chemotherapy administration.    Reynaldo Fischer MD

## 2020-03-19 NOTE — PROGRESS NOTES
Infusion Nursing Note    Lazaro Lund Presents to [facility] today for: Day 29 Delayed Intensification chemo- Cytarabine and Cytoxan.     Due to : T lymphoblastic lymphoma (H)    Intravenous Access/Labs: Port accessed without difficulty. Labs drawn as ordered from port.     Infusion Note: Pre-flush of NS at 250 ml/hr for two hours prior to Cytoxan. Cytoxan infused over one hour. Cytarabine given IV push. Post-flush of NS at 250 ml/hr started.    Post Infusion Assessment: Patient tolerated infusion, Blood return noted pre and post infusion and Vital signs remained stable throughout    Discharge Plan:   Discharged to peds sedation for planned LP. Escorted to Peds Sedation at 1215

## 2020-03-19 NOTE — ANESTHESIA POSTPROCEDURE EVALUATION
Anesthesia POST Procedure Evaluation    Patient: Lazaro Lund   MRN:     [id number] Gender:   male   Age:    19 year old :      2000        Preoperative Diagnosis: Lymphoma (H) [C85.90]   Procedure(s):  Lumbar puncture with intrathecal Chemotherapy (CD)   Postop Comments: No value filed.     Anesthesia Type: No value filed.       Disposition: Outpatient   Postop Pain Control: Uneventful            Sign Out: Well controlled pain   PONV: No   Neuro/Psych: Uneventful            Sign Out: Acceptable/Baseline neuro status   Airway/Respiratory: Uneventful            Sign Out: Acceptable/Baseline resp. status   CV/Hemodynamics: Uneventful            Sign Out: Acceptable CV status   Other NRE: NONE   DID A NON-ROUTINE EVENT OCCUR? No    Event details/Postop Comments:  The patient had significant obstruction while on his side and supine that required jaw thrusts.  Discussed with mom.  She endorsed that he is a loud snorer.  His neck circumference is 46 cm.  Mom endorsed understanding regarding monitoring his sx and to consider discussing with PCP a sleep study to evaluate for possible JOSE RAUL.           Last Anesthesia Record Vitals:  CRNA VITALS  3/19/2020 1422 - 3/19/2020 1522      3/19/2020             NIBP:  101/42    Pulse:  66    NIBP Mean:  70    Temp:  36.7  C (98.1  F)    SpO2:  99 %    Resp Rate (observed):  15          Last PACU Vitals:  Vitals Value Taken Time   /42 3/19/2020  2:55 PM   Temp 36.7  C (98.1  F) 3/19/2020  2:55 PM   Pulse 68 3/19/2020  2:55 PM   Resp 16 3/19/2020  2:55 PM   SpO2 99 % 3/19/2020  2:55 PM   Temp src     NIBP     Pulse     SpO2     Resp     Temp     Ht Rate     Temp 2           Electronically Signed By: Jia Angeles MD, 2020, 3:54 PM

## 2020-03-19 NOTE — ANESTHESIA PREPROCEDURE EVALUATION
Anesthesia Pre-Procedure Evaluation    Patient: Lazaro Lund   MRN:     [id number] Gender:   male   Age:    19 year old :      2000        Preoperative Diagnosis: Lymphoma (H) [C85.90]   Procedure(s):  Lumbar puncture with intrathecal Chemotherapy (CD)     LABS:  CBC:   Lab Results   Component Value Date    WBC 3.5 (L) 2020    WBC 6.7 2020    HGB 12.1 (L) 2020    HGB 11.9 (L) 2020    HCT 36.5 (L) 2020    HCT 35.8 (L) 2020     2020     2020     BMP:   Lab Results   Component Value Date     2020     (H) 2020    POTASSIUM 4.2 2020    POTASSIUM 3.9 2020    CHLORIDE 109 2020    CHLORIDE 116 (H) 2020    CO2 28 2020    CO2 27 2020    BUN 21 2020    BUN 15 2020    CR 0.82 2020    CR 0.57 (L) 2020    GLC 92 2020     (H) 2020     COAGS:   Lab Results   Component Value Date    PTT 45 (H) 2019    INR 1.09 2019    FIBR 293 2019     POC:   Lab Results   Component Value Date    BGM 87 2019     OTHER:   Lab Results   Component Value Date    LACT 0.4 (L) 2019    MICHAEL 8.3 (L) 2020    PHOS 4.2 2019    MAG 2.1 2019    ALBUMIN 3.3 (L) 2020    PROTTOTAL 5.9 (L) 2020    ALT 34 2020    AST 16 2020    ALKPHOS 57 2020    BILITOTAL 0.2 2020    LIPASE 12 (L) 2020    AMYLASE 246 (H) 2019    .0 (H) 2019        Preop Vitals    BP Readings from Last 3 Encounters:   20 113/71   20 126/73   20 131/79    Pulse Readings from Last 3 Encounters:   20 72   20 90   20 90      Resp Readings from Last 3 Encounters:   20 16   20 18   20 20    SpO2 Readings from Last 3 Encounters:   20 100%   20 99%   20 98%      Temp Readings from Last 1 Encounters:   20 36.7  C (98.1  F) (Oral)    Ht Readings from Last  "1 Encounters:   03/19/20 1.842 m (6' 0.52\")      Wt Readings from Last 1 Encounters:   03/19/20 88.2 kg (194 lb 7.1 oz)    Estimated body mass index is 25.99 kg/m  as calculated from the following:    Height as of an earlier encounter on 3/19/20: 1.842 m (6' 0.52\").    Weight as of an earlier encounter on 3/19/20: 88.2 kg (194 lb 7.1 oz).     LDA:  Port A Cath Single 10/24/19 Right Chest wall (Active)   Access Date 03/19/20 03/19/20 0750   Access Attempts 1 03/19/20 0750   Gauge Power noncoring 90 degree bend;20 gauge;3/4 inch 03/19/20 0750   Site Assessment WDL 03/19/20 0750   Line Status Blood return noted;Infusing 03/19/20 0750   Extravasation? No 03/19/20 0750   Dressing Intervention Transparent 03/19/20 0750   Dressing change due 02/27/20 02/20/20 0900   Needle Change Due 02/27/20 02/20/20 0900   Line Necessity Yes, meets criteria 02/20/20 0900   De-Access Date 02/27/20 02/27/20 1500   Date to be Reflushed 03/26/20 02/27/20 1500   Number of days: 147        Past Medical History:   Diagnosis Date     Acute necrotizing pancreatitis 11/07/2019    attributed to asparaginase     Acute pancreatitis due to PEGaspariginase therapy  11/15/2019     DVT of upper extremity (deep vein thrombosis) (H) 09/26/2019    Bilateral      Edema of upper extremity 10/17/2019     Folliculitis 10/17/2019     Migraine 2006    have resolved     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      Past Surgical History:   Procedure Laterality Date     BONE MARROW BIOPSY, BONE SPECIMEN, NEEDLE/TROCAR Left 9/1/2019    Procedure: BIOPSY, BONE MARROW;  Surgeon: Heather Lopez MD;  [facility]     INSERT PICC LINE N/A 8/31/2019    Procedure: INSERTION, PICC;  Surgeon: Michell Keith MD;  [facility]     INSERT PORT VASCULAR ACCESS N/A 10/24/2019    Procedure: INSERTION, VASCULAR ACCESS PORT;  Surgeon: Silviano Martins MD;  [facility]      IR CHEST PORT PLACEMENT > 5 YRS OF AGE  10/24/2019     IR CHEST TUBE PLACEMENT " NON-TUNNELLED LEFT  8/31/2019     IR PICC PLACEMENT > 5 YRS OF AGE  8/31/2019     IR PORT CHECK RIGHT  11/12/2019     SPINAL PUNCTURE,LUMBAR, INTRATHECAL CHEMO DELIVERY N/A 8/31/2019    Procedure: LUMBAR PUNCTURE, WITH INTRATHECAL CHEMOTHERAPY ADMINISTRATION;  Surgeon: Heather Lopez MD;  [facility]     SPINAL PUNCTURE,LUMBAR, INTRATHECAL CHEMO DELIVERY N/A 9/9/2019    Procedure: Lumbar Puncture With Intrathecal Chemo;  Surgeon: Alexi Hayes MD;  [facility]     SPINAL PUNCTURE,LUMBAR, INTRATHECAL CHEMO DELIVERY N/A 10/10/2019    Procedure: Lumbar puncture with IT Chemo (CD);  Surgeon: Reynaldo Campuzano MD;  [facility]      SPINAL PUNCTURE,LUMBAR, INTRATHECAL CHEMO DELIVERY N/A 10/17/2019    Procedure: Lumbar puncture with IT Chemo (not CD);  Surgeon: Reynaldo Campuzano MD;  [facility]      SPINAL PUNCTURE,LUMBAR, INTRATHECAL CHEMO DELIVERY N/A 10/24/2019    Procedure: LUMBAR PUNCTURE, WITH INTRATHECAL CHEMOTHERAPY ADMINISTRATION;  Surgeon: Félix Van APRN CNP;  [facility]      SPINAL PUNCTURE,LUMBAR, INTRATHECAL CHEMO DELIVERY N/A 10/31/2019    Procedure: Lumbar puncture with IT Chemo (not CD);  Surgeon: Reynaldo Campuzano MD;  [facility]      SPINAL PUNCTURE,LUMBAR, INTRATHECAL CHEMO DELIVERY N/A 12/19/2019    Procedure: Lumbar puncture with IT Chem (CD);  Surgeon: Félix Van APRN CNP;  [facility]      SPINAL PUNCTURE,LUMBAR, INTRATHECAL CHEMO DELIVERY N/A 12/23/2019    Procedure: Lumbar puncture with IT Chem (CD);  Surgeon: Heather Lopez MD;  [facility]      SPINAL PUNCTURE,LUMBAR, INTRATHECAL CHEMO DELIVERY N/A 1/23/2020    Procedure: Lumbar puncture with IT Chem (CD);  Surgeon: Reynaldo Campuzano MD;  [facility]      SPINAL PUNCTURE,LUMBAR, INTRATHECAL CHEMO DELIVERY N/A 2/20/2020    Procedure: Lumbar puncture with  intrathecal Chemotherapy (CD);  Surgeon: Reynaldo Campuzano MD;  [facility]      THORACENTESIS N/A 8/31/2019    Procedure: Thoracentesis;  Surgeon: Michell Keith MD;  [facility]      Allergies   Allergen Reactions     Asparaginase Derivatives Other (See Comments)     Severe pancreatitis     No Known Drug Allergies         Anesthesia Evaluation    ROS/Med Hx    No history of anesthetic complications  Comments: Has tolerated anesthetics well in the past.  He gets caffeine to mitigate lumbar puncture headaches.    He is not aware of any FH of problems with anesthesia or bleeding problems.      Cardiovascular Findings - negative ROS  Comments:   TTE 02/04/2020: Normal echocardiogram. Normal appearance and motion of the tricuspid, mitral, pulmonary and aortic valves. No atrial, ventricular or arterial level shunting. LV and RV have normal chamber size, wall thickness, and systolic function. LVEF 67 %.    Neuro Findings - negative ROS  (-) seizures      Pulmonary Findings   (-) asthma and apnea (indicates he has been told that he snores)    HENT Findings - negative HENT ROS    Skin Findings - negative skin ROS      GI/Hepatic/Renal Findings   (-) GERD  Comments: Hx of NV from chemo; has scop patch on.      Treatment course complicated by pancreatitis -now resolved.     Endocrine/Metabolic Findings - negative ROS      Genetic/Syndrome Findings - negative genetics/syndromes ROS    Hematology/Oncology Findings   (+) cancer (T lymphoblastic lymphoma)  Comments: Hx of DVTs -no longer on Lovenox             PHYSICAL EXAM:   Mental Status/Neuro: A/A/O   Airway: Facies: Feasible  Mallampati: II  Mouth/Opening: Full  TM distance: > 6 cm  Neck ROM: Full   Respiratory: Auscultation: CTAB     Resp. Rate: Normal     Resp. Effort: Normal      CV: Rhythm: Regular  Rate: Age appropriate  Heart: Normal Sounds  Edema: None   Comments: bearrd     Dental: Normal Dentition                Assessment:   ASA SCORE: 3     H&P: History and physical reviewed and following examination; no interval change.    NPO Status: NPO Appropriate     Plan:   Anes. Type:  General   Pre-Medication: None   Induction:  IV (Standard)   Airway: Native Airway   Access/Monitoring: PIV   Maintenance: Propofol Sedation     Postop Plan:   Postop Pain: None  Postop Sedation/Airway: Not planned     PONV Management:    Prevention: Ondansetron, Propofol     CONSENT: Direct conversation   Plan and risks discussed with: Patient   Blood Products: Consent Deferred (Minimal Blood Loss)       Comments for Plan/Consent:  Discussed common and potentially harmful risks for General Anesthesia, Native Airway.   These risks include, but were not limited to: Conversion to secured airway, Sore throat, Airway injury, Dental injury, Aspiration, Respiratory issues (Bronchospasm, Laryngospasm, Desaturation), Hemodynamic issues (Arrhythmia, Hypotension, Ischemia), Potential long term consequences of respiratory and hemodynamic issues, PONV, Emergence delirium  Risks of invasive procedures were not discussed: N/A    All questions were answered.           Jia Angeles MD

## 2020-03-20 NOTE — PROGRESS NOTES
This is a recent snapshot of the patient's [facility] medical record.  For current drug dose and complete information and questions, call [phone] or In Basket pool, [facility] home infusion ([id number])  CSN Number:  [id number]

## 2020-03-25 ENCOUNTER — ANESTHESIA EVENT (OUTPATIENT)
Dept: PEDIATRICS | Facility: CLINIC | Age: 20
End: 2020-03-25
Payer: COMMERCIAL

## 2020-03-25 ASSESSMENT — ENCOUNTER SYMPTOMS: SEIZURES: 0

## 2020-03-25 ASSESSMENT — LIFESTYLE VARIABLES: TOBACCO_USE: 1

## 2020-03-26 ENCOUNTER — HOSPITAL ENCOUNTER (OUTPATIENT)
Facility: CLINIC | Age: 20
Discharge: HOME | End: 2020-03-26
Attending: PEDIATRICS | Admitting: PEDIATRICS
Payer: COMMERCIAL

## 2020-03-26 ENCOUNTER — HOME INFUSION (PRE-WILLOW HOME INFUSION) (OUTPATIENT)
Dept: PHARMACY | Facility: CLINIC | Age: 20
End: 2020-03-26

## 2020-03-26 ENCOUNTER — ANESTHESIA (OUTPATIENT)
Dept: PEDIATRICS | Facility: CLINIC | Age: 20
End: 2020-03-26
Payer: COMMERCIAL

## 2020-03-26 ENCOUNTER — OFFICE VISIT (OUTPATIENT)
Dept: PEDIATRIC HEMATOLOGY/ONCOLOGY | Facility: CLINIC | Age: 20
End: 2020-03-26
Attending: NURSE PRACTITIONER
Payer: COMMERCIAL

## 2020-03-26 ENCOUNTER — INFUSION THERAPY VISIT (OUTPATIENT)
Dept: INFUSION THERAPY | Facility: CLINIC | Age: 20
End: 2020-03-26
Attending: NURSE PRACTITIONER
Payer: COMMERCIAL

## 2020-03-26 VITALS
OXYGEN SATURATION: 98 % | WEIGHT: 188.05 LBS | DIASTOLIC BLOOD PRESSURE: 85 MMHG | BODY MASS INDEX: 24.92 KG/M2 | HEIGHT: 73 IN | HEART RATE: 72 BPM | TEMPERATURE: 97.9 F | RESPIRATION RATE: 16 BRPM | SYSTOLIC BLOOD PRESSURE: 137 MMHG

## 2020-03-26 VITALS
HEIGHT: 73 IN | OXYGEN SATURATION: 99 % | DIASTOLIC BLOOD PRESSURE: 65 MMHG | HEART RATE: 67 BPM | WEIGHT: 188.05 LBS | RESPIRATION RATE: 18 BRPM | BODY MASS INDEX: 24.92 KG/M2 | SYSTOLIC BLOOD PRESSURE: 117 MMHG | TEMPERATURE: 97.9 F

## 2020-03-26 DIAGNOSIS — C83.50 T LYMPHOBLASTIC LYMPHOMA (H): Primary | ICD-10-CM

## 2020-03-26 DIAGNOSIS — Z51.11 ENCOUNTER FOR ANTINEOPLASTIC CHEMOTHERAPY: ICD-10-CM

## 2020-03-26 LAB
BASOPHILS # BLD AUTO: 0 10E9/L (ref 0–0.2)
BASOPHILS NFR BLD AUTO: 0.6 %
DIFFERENTIAL METHOD BLD: ABNORMAL
EOSINOPHIL # BLD AUTO: 0 10E9/L (ref 0–0.7)
EOSINOPHIL NFR BLD AUTO: 0.9 %
ERYTHROCYTE [DISTWIDTH] IN BLOOD BY AUTOMATED COUNT: 12.1 % (ref 10–15)
HCT VFR BLD AUTO: 36.1 % (ref 40–53)
HGB BLD-MCNC: 12.6 G/DL (ref 13.3–17.7)
IMM GRANULOCYTES # BLD: 0 10E9/L (ref 0–0.4)
IMM GRANULOCYTES NFR BLD: 0.3 %
LYMPHOCYTES # BLD AUTO: 0.8 10E9/L (ref 0.8–5.3)
LYMPHOCYTES NFR BLD AUTO: 23.5 %
MCH RBC QN AUTO: 30.8 PG (ref 26.5–33)
MCHC RBC AUTO-ENTMCNC: 34.9 G/DL (ref 31.5–36.5)
MCV RBC AUTO: 88 FL (ref 78–100)
MONOCYTES # BLD AUTO: 0.2 10E9/L (ref 0–1.3)
MONOCYTES NFR BLD AUTO: 7.4 %
NEUTROPHILS # BLD AUTO: 2.2 10E9/L (ref 1.6–8.3)
NEUTROPHILS NFR BLD AUTO: 67.3 %
NRBC # BLD AUTO: 0 10*3/UL
NRBC BLD AUTO-RTO: 0 /100
PLATELET # BLD AUTO: 216 10E9/L (ref 150–450)
RBC # BLD AUTO: 4.09 10E12/L (ref 4.4–5.9)
WBC # BLD AUTO: 3.2 10E9/L (ref 4–11)

## 2020-03-26 PROCEDURE — 25000128 H RX IP 250 OP 636: Mod: ZF | Performed by: PEDIATRICS

## 2020-03-26 PROCEDURE — 96365 THER/PROPH/DIAG IV INF INIT: CPT | Performed by: PEDIATRICS

## 2020-03-26 PROCEDURE — 96409 CHEMO IV PUSH SNGL DRUG: CPT

## 2020-03-26 PROCEDURE — 96361 HYDRATE IV INFUSION ADD-ON: CPT

## 2020-03-26 PROCEDURE — 25000128 H RX IP 250 OP 636: Mod: ZF

## 2020-03-26 PROCEDURE — 25000128 H RX IP 250 OP 636: Performed by: NURSE ANESTHETIST, CERTIFIED REGISTERED

## 2020-03-26 PROCEDURE — 85025 COMPLETE CBC W/AUTO DIFF WBC: CPT | Performed by: PEDIATRICS

## 2020-03-26 PROCEDURE — 96450 CHEMOTHERAPY INTO CNS: CPT | Performed by: PEDIATRICS

## 2020-03-26 PROCEDURE — 40001011 ZZH STATISTIC PRE-PROCEDURE NURSING ASSESSMENT: Performed by: PEDIATRICS

## 2020-03-26 PROCEDURE — 25800030 ZZH RX IP 258 OP 636: Mod: ZF

## 2020-03-26 PROCEDURE — 37000008 ZZH ANESTHESIA TECHNICAL FEE, 1ST 30 MIN: Performed by: PEDIATRICS

## 2020-03-26 PROCEDURE — 25000128 H RX IP 250 OP 636: Performed by: STUDENT IN AN ORGANIZED HEALTH CARE EDUCATION/TRAINING PROGRAM

## 2020-03-26 PROCEDURE — 96375 TX/PRO/DX INJ NEW DRUG ADDON: CPT

## 2020-03-26 PROCEDURE — 40000165 ZZH STATISTIC POST-PROCEDURE RECOVERY CARE: Performed by: PEDIATRICS

## 2020-03-26 PROCEDURE — 25800030 ZZH RX IP 258 OP 636: Performed by: PEDIATRICS

## 2020-03-26 PROCEDURE — 25000125 ZZHC RX 250: Performed by: PEDIATRICS

## 2020-03-26 PROCEDURE — 25800030 ZZH RX IP 258 OP 636: Performed by: NURSE ANESTHETIST, CERTIFIED REGISTERED

## 2020-03-26 PROCEDURE — 89050 BODY FLUID CELL COUNT: CPT | Performed by: PEDIATRICS

## 2020-03-26 PROCEDURE — 25000128 H RX IP 250 OP 636: Performed by: PEDIATRICS

## 2020-03-26 RX ORDER — ONDANSETRON 2 MG/ML
4 INJECTION INTRAMUSCULAR; INTRAVENOUS EVERY 30 MIN PRN
Status: DISCONTINUED | OUTPATIENT
Start: 2020-03-26 | End: 2020-03-26 | Stop reason: HOSPADM

## 2020-03-26 RX ORDER — HEPARIN SODIUM (PORCINE) LOCK FLUSH IV SOLN 100 UNIT/ML 100 UNIT/ML
5 SOLUTION INTRAVENOUS
Status: DISCONTINUED | OUTPATIENT
Start: 2020-03-26 | End: 2020-03-26 | Stop reason: HOSPADM

## 2020-03-26 RX ORDER — CYTARABINE 100 MG/ML
75 INJECTION, SOLUTION INTRATHECAL; INTRAVENOUS; SUBCUTANEOUS DAILY
Qty: 4.5 ML | Refills: 0 | Status: SHIPPED | OUTPATIENT
Start: 2020-03-27 | End: 2020-04-16

## 2020-03-26 RX ORDER — HEPARIN SODIUM,PORCINE 10 UNIT/ML
5 VIAL (ML) INTRAVENOUS ONCE
Status: COMPLETED | OUTPATIENT
Start: 2020-03-26 | End: 2020-03-26

## 2020-03-26 RX ORDER — ALBUTEROL SULFATE 0.83 MG/ML
2.5 SOLUTION RESPIRATORY (INHALATION)
Status: DISCONTINUED | OUTPATIENT
Start: 2020-03-26 | End: 2020-03-26 | Stop reason: HOSPADM

## 2020-03-26 RX ORDER — PROPOFOL 10 MG/ML
INJECTION, EMULSION INTRAVENOUS CONTINUOUS PRN
Status: DISCONTINUED | OUTPATIENT
Start: 2020-03-26 | End: 2020-03-26

## 2020-03-26 RX ORDER — SODIUM CHLORIDE 9 MG/ML
INJECTION, SOLUTION INTRAVENOUS
Status: COMPLETED
Start: 2020-03-26 | End: 2020-03-26

## 2020-03-26 RX ORDER — CYTARABINE 20 MG/ML
75 INJECTION, SOLUTION INTRATHECAL; INTRAVENOUS; SUBCUTANEOUS ONCE
Status: COMPLETED | OUTPATIENT
Start: 2020-03-26 | End: 2020-03-26

## 2020-03-26 RX ORDER — PROPOFOL 10 MG/ML
INJECTION, EMULSION INTRAVENOUS PRN
Status: DISCONTINUED | OUTPATIENT
Start: 2020-03-26 | End: 2020-03-26

## 2020-03-26 RX ORDER — ONDANSETRON 2 MG/ML
INJECTION INTRAMUSCULAR; INTRAVENOUS PRN
Status: DISCONTINUED | OUTPATIENT
Start: 2020-03-26 | End: 2020-03-26

## 2020-03-26 RX ORDER — HEPARIN SODIUM (PORCINE) LOCK FLUSH IV SOLN 100 UNIT/ML 100 UNIT/ML
SOLUTION INTRAVENOUS
Status: COMPLETED
Start: 2020-03-26 | End: 2020-03-26

## 2020-03-26 RX ORDER — SODIUM CHLORIDE, SODIUM LACTATE, POTASSIUM CHLORIDE, CALCIUM CHLORIDE 600; 310; 30; 20 MG/100ML; MG/100ML; MG/100ML; MG/100ML
INJECTION, SOLUTION INTRAVENOUS CONTINUOUS PRN
Status: DISCONTINUED | OUTPATIENT
Start: 2020-03-26 | End: 2020-03-26

## 2020-03-26 RX ORDER — DIPHENHYDRAMINE HYDROCHLORIDE 50 MG/ML
INJECTION INTRAMUSCULAR; INTRAVENOUS
Status: COMPLETED
Start: 2020-03-26 | End: 2020-03-26

## 2020-03-26 RX ORDER — ONDANSETRON 2 MG/ML
INJECTION INTRAMUSCULAR; INTRAVENOUS
Status: COMPLETED
Start: 2020-03-26 | End: 2020-03-26

## 2020-03-26 RX ORDER — ONDANSETRON 2 MG/ML
8 INJECTION INTRAMUSCULAR; INTRAVENOUS ONCE
Status: COMPLETED | OUTPATIENT
Start: 2020-03-26 | End: 2020-03-26

## 2020-03-26 RX ORDER — DIPHENHYDRAMINE HYDROCHLORIDE 50 MG/ML
50 INJECTION INTRAMUSCULAR; INTRAVENOUS
Status: COMPLETED | OUTPATIENT
Start: 2020-03-26 | End: 2020-03-26

## 2020-03-26 RX ADMIN — DIPHENHYDRAMINE HYDROCHLORIDE 50 MG: 50 INJECTION, SOLUTION INTRAMUSCULAR; INTRAVENOUS at 11:56

## 2020-03-26 RX ADMIN — CAFFEINE AND SODIUM BENZOATE 500 MG: 125 INJECTION, SOLUTION INTRAMUSCULAR; INTRAVENOUS at 09:47

## 2020-03-26 RX ADMIN — SODIUM CHLORIDE 500 ML: 9 INJECTION, SOLUTION INTRAVENOUS at 11:56

## 2020-03-26 RX ADMIN — PROPOFOL 300 MCG/KG/MIN: 10 INJECTION, EMULSION INTRAVENOUS at 09:28

## 2020-03-26 RX ADMIN — DIPHENHYDRAMINE HYDROCHLORIDE 50 MG: 50 INJECTION INTRAMUSCULAR; INTRAVENOUS at 11:56

## 2020-03-26 RX ADMIN — HEPARIN, PORCINE (PF) 10 UNIT/ML INTRAVENOUS SYRINGE 5 ML: at 10:57

## 2020-03-26 RX ADMIN — PROPOFOL 20 MG: 10 INJECTION, EMULSION INTRAVENOUS at 09:29

## 2020-03-26 RX ADMIN — ONDANSETRON 4 MG: 2 INJECTION INTRAMUSCULAR; INTRAVENOUS at 11:29

## 2020-03-26 RX ADMIN — PROPOFOL 20 MG: 10 INJECTION, EMULSION INTRAVENOUS at 09:34

## 2020-03-26 RX ADMIN — HEPARIN SODIUM (PORCINE) LOCK FLUSH IV SOLN 100 UNIT/ML 500 UNITS: 100 SOLUTION at 11:31

## 2020-03-26 RX ADMIN — SODIUM CHLORIDE, POTASSIUM CHLORIDE, SODIUM LACTATE AND CALCIUM CHLORIDE: 600; 310; 30; 20 INJECTION, SOLUTION INTRAVENOUS at 09:28

## 2020-03-26 RX ADMIN — CYTARABINE 155 MG: 20 INJECTION, SOLUTION INTRATHECAL; INTRAVENOUS; SUBCUTANEOUS at 11:35

## 2020-03-26 RX ADMIN — ONDANSETRON 4 MG: 2 INJECTION INTRAMUSCULAR; INTRAVENOUS at 09:31

## 2020-03-26 RX ADMIN — PROPOFOL 80 MG: 10 INJECTION, EMULSION INTRAVENOUS at 09:28

## 2020-03-26 RX ADMIN — HEPARIN 500 UNITS: 100 SYRINGE at 13:01

## 2020-03-26 RX ADMIN — HEPARIN 500 UNITS: 100 SYRINGE at 11:31

## 2020-03-26 RX ADMIN — METHOTREXATE: 25 INJECTION INTRA-ARTERIAL; INTRAMUSCULAR; INTRATHECAL; INTRAVENOUS at 09:37

## 2020-03-26 RX ADMIN — HEPARIN SODIUM (PORCINE) LOCK FLUSH IV SOLN 100 UNIT/ML 500 UNITS: 100 SOLUTION at 13:01

## 2020-03-26 RX ADMIN — Medication 500 ML: at 11:56

## 2020-03-26 ASSESSMENT — MIFFLIN-ST. JEOR
SCORE: 1904.26
SCORE: 1904.26

## 2020-03-26 ASSESSMENT — PAIN SCALES - GENERAL: PAINLEVEL: NO PAIN (0)

## 2020-03-26 NOTE — ANESTHESIA POSTPROCEDURE EVALUATION
Anesthesia POST Procedure Evaluation    Patient: Lazaro Lund   MRN:     [id number] Gender:   male   Age:    19 year old :      2000        Preoperative Diagnosis: Lymphoma (H) [C85.90]   Procedure(s):  Lumbar puncture with intrathecal Chemotherapy (not CD)   Postop Comments: No value filed.     Anesthesia Type: General       Disposition: Outpatient   Postop Pain Control: Uneventful            Sign Out: Well controlled pain   PONV: No   Neuro/Psych: Uneventful            Sign Out: Acceptable/Baseline neuro status   Airway/Respiratory: Uneventful            Sign Out: Acceptable/Baseline resp. status   CV/Hemodynamics: Uneventful            Sign Out: Acceptable CV status   Other NRE: NONE   DID A NON-ROUTINE EVENT OCCUR? No         Last Anesthesia Record Vitals:  CRNA VITALS  3/26/2020 0908 - 3/26/2020 1008      3/26/2020             Pulse:  94    SpO2:  98 %          Last PACU Vitals:  Vitals Value Taken Time   /56 3/26/2020  9:47 AM   Temp 36.5  C (97.7  F) 3/26/2020  9:47 AM   Pulse 83 3/26/2020  9:47 AM   Resp 16 3/26/2020  9:47 AM   SpO2 97 % 3/26/2020  9:47 AM   Temp src     NIBP     Pulse     SpO2     Resp     Temp     Ht Rate     Temp 2           Electronically Signed By: Stella Montemayor MD, 2020, 12:25 PM

## 2020-03-26 NOTE — ANESTHESIA CARE TRANSFER NOTE
Patient: Lazaro Lund    Procedure(s):  Lumbar puncture with intrathecal Chemotherapy (not CD)    Diagnosis: Lymphoma (H) [C85.90]  Diagnosis Additional Information: No value filed.    Anesthesia Type:   General     Note:  Airway :Nasal Cannula  Patient transferred to: Recovery  Handoff Report: Identifed the Patient, Identified the Reponsible Provider, Reviewed the pertinent medical history, Discussed the surgical course, Reviewed Intra-OP anesthesia mangement and issues during anesthesia, Set expectations for post-procedure period and Allowed opportunity for questions and acknowledgement of understanding      Vitals: (Last set prior to Anesthesia Care Transfer)    CRNA VITALS  3/26/2020 0908 - 3/26/2020 0944      3/26/2020             Pulse:  94    SpO2:  98 %                Electronically Signed By: YOHAN Ashraf CRNA  March 26, 2020  9:44 AM

## 2020-03-26 NOTE — PROGRESS NOTES
Procedure Note:  A Lumbar Puncture was performed in the Pediatric Sedation Suite. Informed consent was obtained prior to the procedure. Lazaro Lund was identified by facial recognition and ID arm band. A time-out was performed. Lazaro Lund was then placed in the left lateral decubitus position and the lumbosacral area was sterily prepped using Chloraprep followed by drape placement. Anatomic landmarks were identified by palpation. Then, a 22 gauge, 3.5 inch spinal needle was easily inserted into the L4/L5 interspace. On the first attempt approximately 3 mL of clear and colorless cerebrospinal fluid was obtained to be sent to the lab for cell count analysis and cytospin. Following that, 15mg of intrathecal Methotrexate in 6 mL of preservative-free normal saline was infused without resistance. The needle was removed and a Band-Aid applied. Lazaro Lund tolerated this procedure very well.     Pediatric Hematology/Oncology Attending Dr. Todd Mercado was present for the entire procedure.    Guillermo Rowan MD  Pediatric Hematology/Oncology & BMT Fellow    I was present for the procedure.  Todd Mercado MD/PhD  Pediatric Oncology

## 2020-03-26 NOTE — ANESTHESIA PREPROCEDURE EVALUATION
Anesthesia Pre-Procedure Evaluation    Patient: Lazaro Lund   MRN:     [id number] Gender:   male   Age:    19 year old :      2000        Preoperative Diagnosis: Lymphoma (H) [C85.90]   Procedure(s):  Lumbar puncture with intrathecal Chemotherapy (not CD)     LABS:  CBC:   Lab Results   Component Value Date    WBC 3.5 (L) 2020    WBC 6.7 2020    HGB 12.1 (L) 2020    HGB 11.9 (L) 2020    HCT 36.5 (L) 2020    HCT 35.8 (L) 2020     2020     2020     BMP:   Lab Results   Component Value Date     2020     (H) 2020    POTASSIUM 4.2 2020    POTASSIUM 3.9 2020    CHLORIDE 109 2020    CHLORIDE 116 (H) 2020    CO2 28 2020    CO2 27 2020    BUN 21 2020    BUN 15 2020    CR 0.82 2020    CR 0.57 (L) 2020    GLC 92 2020     (H) 2020     COAGS:   Lab Results   Component Value Date    PTT 45 (H) 2019    INR 1.09 2019    FIBR 293 2019     POC:   Lab Results   Component Value Date    BGM 87 2019     OTHER:   Lab Results   Component Value Date    LACT 0.4 (L) 2019    MICHAEL 8.3 (L) 2020    PHOS 4.2 2019    MAG 2.1 2019    ALBUMIN 3.3 (L) 2020    PROTTOTAL 5.9 (L) 2020    ALT 34 2020    AST 16 2020    ALKPHOS 57 2020    BILITOTAL 0.2 2020    LIPASE 12 (L) 2020    AMYLASE 246 (H) 2019    .0 (H) 2019        Preop Vitals    BP Readings from Last 3 Encounters:   20 115/65   20 113/71   20 126/73    Pulse Readings from Last 3 Encounters:   20 66   20 72   20 90      Resp Readings from Last 3 Encounters:   20 18   20 16   20 18    SpO2 Readings from Last 3 Encounters:   20 99%   20 100%   20 99%      Temp Readings from Last 1 Encounters:   20 36.4  C (97.6  F) (Axillary)    Ht Readings  "from Last 1 Encounters:   03/19/20 1.842 m (6' 0.52\")      Wt Readings from Last 1 Encounters:   03/19/20 88.2 kg (194 lb 7.1 oz)    Estimated body mass index is 25.99 kg/m  as calculated from the following:    Height as of 3/19/20: 1.842 m (6' 0.52\").    Weight as of 3/19/20: 88.2 kg (194 lb 7.1 oz).     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      Past Surgical History:   Procedure Laterality Date     BONE MARROW BIOPSY, BONE SPECIMEN, NEEDLE/TROCAR Left 9/1/2019    Procedure: BIOPSY, BONE MARROW;  Surgeon: Heather Lopez MD;  [facility]     INSERT PICC LINE N/A 8/31/2019    Procedure: INSERTION, PICC;  Surgeon: Michell Keith MD;  [facility]     INSERT PORT VASCULAR ACCESS N/A 10/24/2019    Procedure: INSERTION, VASCULAR ACCESS PORT;  Surgeon: Silviano Martins MD;  [facility]      IR CHEST PORT PLACEMENT > 5 YRS OF AGE  10/24/2019     IR CHEST TUBE PLACEMENT NON-TUNNELLED LEFT  8/31/2019     IR PICC PLACEMENT > 5 YRS OF AGE  8/31/2019     IR PORT CHECK RIGHT  11/12/2019     SPINAL PUNCTURE,LUMBAR, INTRATHECAL CHEMO DELIVERY N/A 8/31/2019    Procedure: LUMBAR PUNCTURE, WITH INTRATHECAL CHEMOTHERAPY ADMINISTRATION;  Surgeon: Heather Lopez MD;  [facility]     SPINAL PUNCTURE,LUMBAR, INTRATHECAL CHEMO DELIVERY N/A 9/9/2019    Procedure: Lumbar Puncture With Intrathecal Chemo;  Surgeon: Alexi Hayes MD;  [facility]     SPINAL PUNCTURE,LUMBAR, INTRATHECAL CHEMO DELIVERY N/A 10/10/2019    Procedure: " Lumbar puncture with IT Chemo (CD);  Surgeon: Reynaldo Campuzano MD;  [facility]      SPINAL PUNCTURE,LUMBAR, INTRATHECAL CHEMO DELIVERY N/A 10/17/2019    Procedure: Lumbar puncture with IT Chemo (not CD);  Surgeon: Reynaldo Campuzano MD;  [facility]      SPINAL PUNCTURE,LUMBAR, INTRATHECAL CHEMO DELIVERY N/A 10/24/2019    Procedure: LUMBAR PUNCTURE, WITH INTRATHECAL CHEMOTHERAPY ADMINISTRATION;  Surgeon: Félix Van APRN CNP;  [facility]      SPINAL PUNCTURE,LUMBAR, INTRATHECAL CHEMO DELIVERY N/A 10/31/2019    Procedure: Lumbar puncture with IT Chemo (not CD);  Surgeon: Reynaldo Campuzano MD;  [facility]      SPINAL PUNCTURE,LUMBAR, INTRATHECAL CHEMO DELIVERY N/A 12/19/2019    Procedure: Lumbar puncture with IT Chem (CD);  Surgeon: Félix Van APRN CNP;  [facility]      SPINAL PUNCTURE,LUMBAR, INTRATHECAL CHEMO DELIVERY N/A 12/23/2019    Procedure: Lumbar puncture with IT Chem (CD);  Surgeon: Heather Lopez MD;  [facility]      SPINAL PUNCTURE,LUMBAR, INTRATHECAL CHEMO DELIVERY N/A 1/23/2020    Procedure: Lumbar puncture with IT Chem (CD);  Surgeon: Reynaldo Campuzano MD;  [facility]      SPINAL PUNCTURE,LUMBAR, INTRATHECAL CHEMO DELIVERY N/A 2/20/2020    Procedure: Lumbar puncture with intrathecal Chemotherapy (CD);  Surgeon: Reynaldo Campuzano MD;  [facility]      SPINAL PUNCTURE,LUMBAR, INTRATHECAL CHEMO DELIVERY N/A 3/19/2020    Procedure: Lumbar puncture with intrathecal Chemotherapy (CD);  Surgeon: Reynaldo Campuzano MD;  [facility]      THORACENTESIS N/A 8/31/2019    Procedure: Thoracentesis;  Surgeon: Michell Keith MD;  [facility]      Allergies   Allergen Reactions     Asparaginase Derivatives Other (See Comments)     Severe pancreatitis     No Known Drug Allergies         Anesthesia Evaluation     . Pt  has had prior anesthetic. Type: General    No history of anesthetic complications          ROS/MED HX    ENT/Pulmonary:     (+)tobacco use (now on nicotine patches), Past use , . .   (-) asthma   Neurologic:  - neg neurologic ROS    (-) seizures   Cardiovascular: Comment:   TTE 02/04/2020: Normal echocardiogram. Normal appearance and motion of the tricuspid, mitral, pulmonary and aortic valves. No atrial, ventricular or arterial level shunting. LV and RV have normal chamber size, wall thickness, and systolic function. LVEF 67 %.    At presentation/during induction: pleural effusion that required a chest tube and a pericardial effusion that was not drained. His Induction was complicated by cardiac compression secondary to his mass leading to tamponade, which improved. - neg cardiovascular ROS       METS/Exercise Tolerance:     Hematologic:     (+) History of blood clots (now off lovenox) pt is not anticoagulated, Anemia, Other Hematologic Disorder-      Musculoskeletal:  - neg musculoskeletal ROS       GI/Hepatic: Comment: H/o asparginase induced pancreatitis - neg GI/hepatic ROS      (-) GERD   Renal/Genitourinary:  - ROS Renal section negative       Endo:  - neg endo ROS       Psychiatric:         Infectious Disease:  - neg infectious disease ROS       Malignancy:   (+) Malignancy History of Lymphoma/Leukemia          Other:                         PHYSICAL EXAM:   Mental Status/Neuro: A/A/O   Airway: Facies: Feasible  Mallampati: II  Mouth/Opening: Full  TM distance: > 6 cm  Neck ROM: Full   Respiratory: Auscultation: CTAB     Resp. Rate: Normal     Resp. Effort: Normal      CV: Rhythm: Regular  Rate: Age appropriate  Heart: Normal Sounds  Edema: None   Comments:      Dental: Normal Dentition                Assessment:   ASA SCORE: 3    H&P: History and physical reviewed and following examination; no interval change.   Smoking Status:  Non-Smoker/Unknown   NPO Status: NPO Appropriate     Plan:   Anes. Type:   General   Pre-Medication: None   Induction:  IV (Standard)   Airway: Native Airway   Access/Monitoring: PIV   Maintenance: Propofol Sedation     Postop Plan:   Postop Pain: None  Postop Sedation/Airway: Not planned  Disposition: Outpatient     PONV Management:   Adult Risk Factors:, Non-Smoker   Prevention: Ondansetron, Scopolamine, Propofol (Patient has home scop patch on)     CONSENT: Direct conversation   Plan and risks discussed with: Patient   Blood Products: Consent Deferred (Minimal Blood Loss)       Comments for Plan/Consent:  Gets caffeine to mitigate spinal headache.     Discussed risks of anesthesia including nausea, vomiting, sore throat, dental damage, cardiopulmonary complications, agitation, neurologic complications, and serious complications.                   Stella Montemayor MD

## 2020-03-26 NOTE — LETTER
3/26/2020      RE: Lazaro Lund  [address] [facility] [address]       Procedure Note:  A Lumbar Puncture was performed in the Pediatric Sedation Suite. Informed consent was obtained prior to the procedure. Lazaro Lund was identified by facial recognition and ID arm band. A time-out was performed. Lazaro Lund was then placed in the left lateral decubitus position and the lumbosacral area was sterily prepped using Chloraprep followed by drape placement. Anatomic landmarks were identified by palpation. Then, a 22 gauge, 3.5 inch spinal needle was easily inserted into the L4/L5 interspace. On the first attempt approximately 3 mL of clear and colorless cerebrospinal fluid was obtained to be sent to the lab for cell count analysis and cytospin. Following that, 15mg of intrathecal Methotrexate in 6 mL of preservative-free normal saline was infused without resistance. The needle was removed and a Band-Aid applied. Lazaro Lund tolerated this procedure very well.     Pediatric Hematology/Oncology Attending Dr. Todd Mercado was present for the entire procedure.    Guillermo Rowan MD  Pediatric Hematology/Oncology & BMT Fellow    I was present for the procedure.  Todd Mercado MD/PhD  Pediatric Oncology      Todd Mercado MD

## 2020-03-26 NOTE — DISCHARGE INSTRUCTIONS
Care post Lumbar Puncture     Do not remove bandage/dressing for 24 hours -- after this time they can be removed    No bath, shower or soaking of the dressing for 24 hours    Activity as tolerated by the patient    Diet as able to tolerated    May use Tylenol as needed for pain control -- DO NOT use Ibuprofen    Can apply icepack to the site for discomfort -- no more than 10 minutes at a time    If bleeding presents apply pressure for 5 minutes    Call [phone] ask for Peds BMT/Hem/Onc fellow on call if complications arise including:    persistent bleeding    fever greater than 100.5    Pain    Lumbar punctures can cause headache. If the pain is not controlled with Tylenol (acetaminophen) please call the Peds BMT/Hem/Onc fellow on call    Home Instructions for Your Child after Sedation  Today your child received (medicine):  Propofol, Zofran and Caffeine  Please keep this form with your health records  Your child may be more sleepy and irritable today than normal. An adult should stay with your child for the rest of the day. The medicine may make the child dizzy. Avoid activities that require balance (bike riding, skating, climbing stairs, walking).  Remember:    When your child wants to eat again, start with liquids (juice, soda pop, Popsicles). If your child feels well enough, you may try a regular diet. It is best to offer light meals for the first 24 hours.    If your child has nausea (feels sick to the stomach) or vomiting (throws up), give small amounts of clear liquids (7-Up, Sprite, apple juice or broth). Fluids are more important than food until your child is feeling better.    Wait 24 hours before giving medicine that contains alcohol. This includes liquid cold, cough and allergy medicines (Robitussin, Vicks Formula 44 for children, Benadryl, Chlor-Trimeton).    If you will leave your child with a , give the sitter a copy of these instructions.  Call your doctor if:    You have questions  about the test results.    Your child vomits (throws up) more than two times.    Your child is very fussy or irritable.    You have trouble waking your child.     If your child has trouble breathing, call [phone].  If you have any questions or concerns, please call:  Pediatric Sedation Unit [phone]  Pediatric clinic  [phone]  [facility]  [phone] (ask for the Pediatric Anesthesiologist on call)  Emergency department [phone]  [facility] toll-free number [phone] (Monday--Friday, 8 a.m. to 4:30 p.m.)  I understand these instructions. I have all of my personal belongings.

## 2020-03-26 NOTE — PROGRESS NOTES
Infusion Nursing Note    Lazaro Lund Presents to [facility] Infusion [facility] today for: Cytarabine    Due to : T lymphoblastic lymphoma (H)    Intravenous Access/Labs: Pt has single lumen port which was accessed in peds sedation prior to his LP this morning. Positive blood return noted.     Coping:   Child Family Life declined as pt is an adult.     Infusion Note: Cytarabine given via IV push with positive blood returns noted pre and post infusion.  Upon arrival to clinic from sedation, pt was nauseated and had several episodes of emesis.  Pt was only medicated with 4mg of Zofran prior to LP, so remaining 4mg of regular dose was given.  Pt was still having emesis.  Prior to de-accessing pt's port, Luana Van NP ordered a fluid bolus and IV Benadryl.  Both given per provider order and pt was able to sleep and had no more nausea/vomiting. He stated he felt a lot better. Following fluid bolus, port was flushed, heparin locked, and de-accessed.  Pt's mom was waiting in front with car for him, as he was not to drive following sedation.     Discharge Plan:   Patient verbalized understanding of discharge instructions and left in stable condition at conclusion of appt.

## 2020-03-26 NOTE — PROGRESS NOTES
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 Lazaro presented with acute onset cough and was found to have an anterior mediastinal mass and malignant left-sided pleural effusion.  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  He was admitted to [facility] oncology service 8/30 and started on treatment per [id number].  He had a pleural effusion that required a chest tube and a pericardial effusion that was not drained. His Induction was complicated by cardiac compression secondary to his mass leading to tamponade, this improved through out his hospital stay.  He also had dysphagia that improved with treatment of his mass, swallow study was normal. His course was recently complicated by extensive bilateral UE DVTs, he remains on anticoagulation.  Most recently his course was complicated by the development of severe asparaginase induced pancreatitis. He became quite ill with SIRS and associated hypotension, he required pressor support in the ICU.  Fortunately Lazaro recovered well and his subsequent imaging has continued to show improvement.  He comes to clinic today for Day 36 of Delayed Intensification.    HPI:   Lazaro comes to clinic today by himself.  He reports nausea and vomiting following his cytoxan and sherry-C last week.  He vomited a few times per day but was able to keep down food and fluids in between.  He had not had any vomiting since Monday until coming to clinic today.  Following his procedure today he's had nausea and vomiting despite getting zofran during sedation.    Lazaro denies any diarrhea or constipation.  No mucositis.  No fever or respiratory symptoms.  No skin concerns.  He denies any pain.  He is sleeping well at night. No paresthesias. Lazaro reports the nicotine patches are working well for him, he has not had a cigarette in 3 weeks.     Review of  systems:  Remainder of ROS is complete and negative.    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    PFMH:   Family History   Problem Relation Age of Onset     No Known Problems Mother      No Known Problems Father      Asthma Brother      Thyroid Cancer Paternal Grandmother      Melanoma Paternal Aunt        Social History: Lazaro previously lived at home with his dad and step mom in [place] but is now staying with his mom in [place].  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    Current Medications:  Current Outpatient Medications on File Prior to Visit   Medication Sig Dispense Refill     [START ON 3/27/2020] cytarabine, PF, (CYTOSAR) 100 MG/ML injection Inject 1.5 mLs (150 mg) Subcutaneous daily for 3 days Start day after clinic dose. 4.5 mL 0     cytarabine, PF, (CYTOSAR) 100 MG/ML injection Inject 1.5 mLs (150 mg) Subcutaneous daily for 3 days Start day after clinic dose. 4.5 mL 0     diphenhydrAMINE (BENADRYL) 25 MG capsule Take 1-2 capsules (25-50 mg) by mouth every 6 hours as needed (Breakthrough Nausea and Vomiting ) 30 capsule 1     nicotine 14 MG/24HR TD 24 hr patch Place 1 patch onto the skin every 24 hours 42 patch 0     ondansetron (ZOFRAN-ODT) 8 MG ODT tab Take 1 tablet (8 mg) by mouth every 8 hours as needed for nausea 20 tablet 6     pantoprazole 40 MG PO EC tablet Take 1 tablet (40 mg) by mouth every morning (before breakfast) 30 tablet 2  "    polyethylene glycol (MIRALAX/GLYCOLAX) packet Take 17 g by mouth daily 30 packet 0     scopolamine (TRANSDERM) 1 MG/3DAYS 72 hr patch Place 1 patch onto the skin every 72 hours 10 patch 3     sennosides (SENOKOT) 8.6 MG tablet Take 2 tablets by mouth 2 times daily as needed for constipation 60 each 1     sulfamethoxazole-trimethoprim (BACTRIM DS) 800-160 MG tablet Take 1 tablet by mouth Every Mon, Tues two times daily 16 tablet 11     thioguanine (TABLOID) 40 MG tablet Take 3 tablets Mon - Sat and 3.5 tablets on Sunday. Days: 29 through 42. 43 tablet 0     Vitamin D, Cholecalciferol, 25 MCG (1000 UT) TABS Take 2 tablets (2,000 Units) by mouth daily 60 tablet 3   Lazaro reports he is taking bactrim and protonix regularly.  No missed doses of home cytarabine or thioguanine.    Received influenza vaccine for the 4933-4185 season.      Physical Exam:   Temp:  [97.6  F (36.4  C)-98.1  F (36.7  C)] 97.9  F (36.6  C)  Pulse:  [63-83] 67  Resp:  [14-18] 18  BP: (100-137)/(51-85) 117/65  SpO2:  [97 %-99 %] 99 %  Wt Readings from Last 4 Encounters:   03/26/20 85.3 kg (188 lb 0.8 oz)   03/26/20 85.3 kg (188 lb 0.8 oz)   03/19/20 88.2 kg (194 lb 7.1 oz)   03/12/20 89.1 kg (196 lb 6.9 oz)     Ht Readings from Last 2 Encounters:   03/26/20 1.842 m (6' 0.52\")   03/26/20 1.842 m (6' 0.52\")     Lazaro feels his baseline weight was 180 lbs.  General: Lazaro is alert, interactive and appropriate. He is nauseated during exam.  HEENT: Skull is atrauamatic and normocephalic.  Full head of hair. PERRLA, sclera are non icteric and not injected, EOM are intact, gaze slightly disconjugate which is his baseline. Nares are patent without drainage. Oropharynx clear, no plaques noted. Buccal mucosa and tongue clear. MMM. Tympanic membranes are opaque bilaterally with light reflex and landmarks present.  Voice no longer hoarse.  Lymph:  Neck is supple without lymphadenopathy.  There is no supraclavicular, axillary or inguinal lymphadenopathy " palpated.  Cardiovascular:  HR is regular, S1, S2 no murmur.  Capillary refill is < 2 seconds. Right arm without edema or erythema.  No pitting edema.  Cap refill < 2 sec. Peripheral pulses 2+/=. He has mildly distended veins noted on the left upper chest, not extending up to the neck, overlying the pectoralis.   Respiratory: No cough noted. Respirations are easy.  Lungs are clear to auscultation through out.  No crackles or wheezes.  Gastrointestinal:  BS present in all quadrants.  Abdomen is soft and flat.  Lazaro denies LUQ discomfort, no pain with palpation. No hepatosplenomegaly or masses are palpated.  Skin: Lesion resolved in the left post auricular area.  No other skin lesions noted.  Neurological:  CN 2-12 grossly intact. Gait is normal.  No issues with balance. Sensation intact in hands and feet.     Musculoskeletal:  Good strength and ROM in all extremities.  Strong dorsiflexion at ankles and great toes (5/5) bilaterally without any pain at the Achilles.    Labs:   Results for orders placed or performed during the hospital encounter of 03/26/20   CBC with platelets differential     Status: Abnormal   Result Value Ref Range    WBC 3.2 (L) 4.0 - 11.0 10e9/L    RBC Count 4.09 (L) 4.4 - 5.9 10e12/L    Hemoglobin 12.6 (L) 13.3 - 17.7 g/dL    Hematocrit 36.1 (L) 40.0 - 53.0 %    MCV 88 78 - 100 fl    MCH 30.8 26.5 - 33.0 pg    MCHC 34.9 31.5 - 36.5 g/dL    RDW 12.1 10.0 - 15.0 %    Platelet Count 216 150 - 450 10e9/L    Diff Method Automated Method     % Neutrophils 67.3 %    % Lymphocytes 23.5 %    % Monocytes 7.4 %    % Eosinophils 0.9 %    % Basophils 0.6 %    % Immature Granulocytes 0.3 %    Nucleated RBCs 0 0 /100    Absolute Neutrophil 2.2 1.6 - 8.3 10e9/L    Absolute Lymphocytes 0.8 0.8 - 5.3 10e9/L    Absolute Monocytes 0.2 0.0 - 1.3 10e9/L    Absolute Eosinophils 0.0 0.0 - 0.7 10e9/L    Absolute Basophils 0.0 0.0 - 0.2 10e9/L    Abs Immature Granulocytes 0.0 0 - 0.4 10e9/L    Absolute Nucleated RBC 0.0     Cell count with differential CSF:     Status: None   Result Value Ref Range    WBC CSF 0 0 - 5 /uL    RBC CSF 0 0 - 2 /uL    Tube Number 2 #    Color CSF Colorless CLRL^Colorless    Appearance CSF Clear CLER^Clear       Assessment:  Lazaro Lund is a 19 year old young man with T cell lymphoblastic lymphoma (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  He is being treated per [facility] protocol [id number].   He's had a CRu following Induction with a CR at the end of Consolidation. His course has been complicated by extensive bilateral DVT and severe pancreatitis.  He completed treatment with lovenox. Recent abdominal CT shows continued improvement in regards to the sequelae from his pancreatitis.  Asparaginase will be permanently discontinued from his treatment regimen. He is on Vit D for deficiency.  He comes to clinic today for Day 36 of Delayed Intensification.  He's had nausea and vomiting with this course. Blood counts look good. He recently quit smoking.     Plan:   1) Reviewed labs with Lazaro, no transfusions needed.   2) Additional IV zofran given with minimal relief so IV benadryl and 500ml NS bolus given with good relief.  Lazaro felt better after this and had gone for a few hours without vomiting so he elected to go home.  Asked him to call if he would have any further vomiting.   3) Day 36 cytarabine given in clinic, [facility] will supply D37-39 doses for administration at home.   4) Continue 6TG through 4/1/20.  5) Continue off lovenox. Low threshold to repeat U/S if any signs of thrombus.   6) Lazaro will have a repeat CT at the end of April per Dr Coronel. His last note indicates he would like to follow him with imaging until he has complete resolution.    7) Continue Vit D 3 2000IU daily, repeat level at 19, continue current dose.  Recheck level in August.  8) Day 29 Induction LP deferred, original plan was to make up on Day 29 of Consolidation however given his recent complications we decided to defer this to Maintenance  therapy.  9) TPMT and NUDT15 genotype is normal, plan for regular dose thiopurines.  10) Given significant spinal headache history will plan for IV caffeine following LPs in the future.   11) Recommend Lazaro have his eyes checked, however he should wait until he's been off steroids for at least a few weeks.  12) Lazaro is doing well with smoking cessation, he has adequate nicotine patches, continue to follow.  Have discussed marijuana use and that I would strongly recommend he avoid using marijuana, especially during Delayed Intensification due to risk for fungal infection.  13) RTC in one week for Day 43 therapy.

## 2020-03-26 NOTE — LETTER
3/26/2020      RE: Lazaro Lund  [address] 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 Lazaro presented with acute onset cough and was found to have an anterior mediastinal mass and malignant left-sided pleural effusion.  A CT guided biopsy was obtained at [facility] and pathology was consistent with T-cell leukemia vs lymphoma.  He was admitted to [facility] oncology service 8/30 and started on treatment per [id number].  He had a pleural effusion that required a chest tube and a pericardial effusion that was not drained. His Induction was complicated by cardiac compression secondary to his mass leading to tamponade, this improved through out his hospital stay.  He also had dysphagia that improved with treatment of his mass, swallow study was normal. His course was recently complicated by extensive bilateral UE DVTs, he remains on anticoagulation.  Most recently his course was complicated by the development of severe asparaginase induced pancreatitis. He became quite ill with SIRS and associated hypotension, he required pressor support in the ICU.  Fortunately Lazaro recovered well and his subsequent imaging has continued to show improvement.  He comes to clinic today for Day 36 of Delayed Intensification.    HPI:   Lazaro comes to clinic today by himself.  He reports nausea and vomiting following his cytoxan and sherry-C last week.  He vomited a few times per day but was able to keep down food and fluids in between.  He had not had any vomiting since Monday until coming to clinic today.  Following his procedure today he's had nausea and vomiting despite getting zofran during sedation.    Lazaro denies any diarrhea or constipation.  No mucositis.  No fever or respiratory symptoms.  No skin concerns.  He denies any pain.  He is sleeping well at night. No paresthesias. Lazaro reports the nicotine  patches are working well for him, he has not had a cigarette in 3 weeks.     Review of systems:  Remainder of ROS is complete and negative.    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    PFMH:   Family History   Problem Relation Age of Onset     No Known Problems Mother      No Known Problems Father      Asthma Brother      Thyroid Cancer Paternal Grandmother      Melanoma Paternal Aunt        Social History: Lazaro previously lived at home with his dad and step mom in [place] but is now staying with his mom in [place].  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    Current Medications:  Current Outpatient Medications on File Prior to Visit   Medication Sig Dispense Refill     [START ON 3/27/2020] cytarabine, PF, (CYTOSAR) 100 MG/ML injection Inject 1.5 mLs (150 mg) Subcutaneous daily for 3 days Start day after clinic dose. 4.5 mL 0     cytarabine, PF, (CYTOSAR) 100 MG/ML injection Inject 1.5 mLs (150 mg) Subcutaneous daily for 3 days Start day after clinic dose. 4.5 mL 0     diphenhydrAMINE (BENADRYL) 25 MG capsule Take 1-2 capsules (25-50 mg) by mouth every 6 hours as needed (Breakthrough Nausea and Vomiting ) 30 capsule 1     nicotine 14 MG/24HR TD 24 hr patch Place 1 patch onto the skin every 24 hours 42 patch 0     ondansetron (ZOFRAN-ODT) 8 MG ODT tab Take 1 tablet (8 mg) by mouth every 8 hours as needed for nausea 20 tablet 6     pantoprazole 40 MG PO  "EC tablet Take 1 tablet (40 mg) by mouth every morning (before breakfast) 30 tablet 2     polyethylene glycol (MIRALAX/GLYCOLAX) packet Take 17 g by mouth daily 30 packet 0     scopolamine (TRANSDERM) 1 MG/3DAYS 72 hr patch Place 1 patch onto the skin every 72 hours 10 patch 3     sennosides (SENOKOT) 8.6 MG tablet Take 2 tablets by mouth 2 times daily as needed for constipation 60 each 1     sulfamethoxazole-trimethoprim (BACTRIM DS) 800-160 MG tablet Take 1 tablet by mouth Every Mon, Tues two times daily 16 tablet 11     thioguanine (TABLOID) 40 MG tablet Take 3 tablets Mon - Sat and 3.5 tablets on Sunday. Days: 29 through 42. 43 tablet 0     Vitamin D, Cholecalciferol, 25 MCG (1000 UT) TABS Take 2 tablets (2,000 Units) by mouth daily 60 tablet 3   Lazaro reports he is taking bactrim and protonix regularly.  No missed doses of home cytarabine or thioguanine.    Received influenza vaccine for the 9856-1666 season.      Physical Exam:   Temp:  [97.6  F (36.4  C)-98.1  F (36.7  C)] 97.9  F (36.6  C)  Pulse:  [63-83] 67  Resp:  [14-18] 18  BP: (100-137)/(51-85) 117/65  SpO2:  [97 %-99 %] 99 %  Wt Readings from Last 4 Encounters:   03/26/20 85.3 kg (188 lb 0.8 oz)   03/26/20 85.3 kg (188 lb 0.8 oz)   03/19/20 88.2 kg (194 lb 7.1 oz)   03/12/20 89.1 kg (196 lb 6.9 oz)     Ht Readings from Last 2 Encounters:   03/26/20 1.842 m (6' 0.52\")   03/26/20 1.842 m (6' 0.52\")     Lazaro feels his baseline weight was 180 lbs.  General: Lazaro is alert, interactive and appropriate. He is nauseated during exam.  HEENT: Skull is atrauamatic and normocephalic.  Full head of hair. PERRLA, sclera are non icteric and not injected, EOM are intact, gaze slightly disconjugate which is his baseline. Nares are patent without drainage. Oropharynx clear, no plaques noted. Buccal mucosa and tongue clear. MMM. Tympanic membranes are opaque bilaterally with light reflex and landmarks present.  Voice no longer hoarse.  Lymph:  Neck is supple " without lymphadenopathy.  There is no supraclavicular, axillary or inguinal lymphadenopathy palpated.  Cardiovascular:  HR is regular, S1, S2 no murmur.  Capillary refill is < 2 seconds. Right arm without edema or erythema.  No pitting edema.  Cap refill < 2 sec. Peripheral pulses 2+/=. He has mildly distended veins noted on the left upper chest, not extending up to the neck, overlying the pectoralis.   Respiratory: No cough noted. Respirations are easy.  Lungs are clear to auscultation through out.  No crackles or wheezes.  Gastrointestinal:  BS present in all quadrants.  Abdomen is soft and flat.  Lazaro denies LUQ discomfort, no pain with palpation. No hepatosplenomegaly or masses are palpated.  Skin: Lesion resolved in the left post auricular area.  No other skin lesions noted.  Neurological:  CN 2-12 grossly intact. Gait is normal.  No issues with balance. Sensation intact in hands and feet.     Musculoskeletal:  Good strength and ROM in all extremities.  Strong dorsiflexion at ankles and great toes (5/5) bilaterally without any pain at the Achilles.    Labs:   Results for orders placed or performed during the hospital encounter of 03/26/20   CBC with platelets differential     Status: Abnormal   Result Value Ref Range    WBC 3.2 (L) 4.0 - 11.0 10e9/L    RBC Count 4.09 (L) 4.4 - 5.9 10e12/L    Hemoglobin 12.6 (L) 13.3 - 17.7 g/dL    Hematocrit 36.1 (L) 40.0 - 53.0 %    MCV 88 78 - 100 fl    MCH 30.8 26.5 - 33.0 pg    MCHC 34.9 31.5 - 36.5 g/dL    RDW 12.1 10.0 - 15.0 %    Platelet Count 216 150 - 450 10e9/L    Diff Method Automated Method     % Neutrophils 67.3 %    % Lymphocytes 23.5 %    % Monocytes 7.4 %    % Eosinophils 0.9 %    % Basophils 0.6 %    % Immature Granulocytes 0.3 %    Nucleated RBCs 0 0 /100    Absolute Neutrophil 2.2 1.6 - 8.3 10e9/L    Absolute Lymphocytes 0.8 0.8 - 5.3 10e9/L    Absolute Monocytes 0.2 0.0 - 1.3 10e9/L    Absolute Eosinophils 0.0 0.0 - 0.7 10e9/L    Absolute Basophils 0.0  0.0 - 0.2 10e9/L    Abs Immature Granulocytes 0.0 0 - 0.4 10e9/L    Absolute Nucleated RBC 0.0    Cell count with differential CSF:     Status: None   Result Value Ref Range    WBC CSF 0 0 - 5 /uL    RBC CSF 0 0 - 2 /uL    Tube Number 2 #    Color CSF Colorless CLRL^Colorless    Appearance CSF Clear CLER^Clear       Assessment:  Lazaro Lund is a 19 year old young man with T cell lymphoblastic lymphoma (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  He is being treated per [facility]G protocol [id number].   He's had a CRu following Induction with a CR at the end of Consolidation. His course has been complicated by extensive bilateral DVT and severe pancreatitis.  He completed treatment with lovenox. Recent abdominal CT shows continued improvement in regards to the sequelae from his pancreatitis.  Asparaginase will be permanently discontinued from his treatment regimen. He is on Vit D for deficiency.  He comes to clinic today for Day 36 of Delayed Intensification.  He's had nausea and vomiting with this course. Blood counts look good. He recently quit smoking.     Plan:   1) Reviewed labs with Lazaro, no transfusions needed.   2) Additional IV zofran given with minimal relief so IV benadryl and 500ml NS bolus given with good relief.  Lazaro felt better after this and had gone for a few hours without vomiting so he elected to go home.  Asked him to call if he would have any further vomiting.   3) Day 36 cytarabine given in clinic, [facility] will supply D37-39 doses for administration at home.   4) Continue 6TG through 4/1/20.  5) Continue off lovenox. Low threshold to repeat U/S if any signs of thrombus.   6) Lazaro will have a repeat CT at the end of April per Dr Coronel. His last note indicates he would like to follow him with imaging until he has complete resolution.    7) Continue Vit D 3 2000IU daily, repeat level at 19, continue current dose.  Recheck level in August.  8) Day 29 Induction LP deferred, original plan was to make up on Day 29 of  Consolidation however given his recent complications we decided to defer this to Maintenance therapy.  9) TPMT and NUDT15 genotype is normal, plan for regular dose thiopurines.  10) Given significant spinal headache history will plan for IV caffeine following LPs in the future.   11) Recommend Lazaro have his eyes checked, however he should wait until he's been off steroids for at least a few weeks.  12) Lazaro is doing well with smoking cessation, he has adequate nicotine patches, continue to follow.  Have discussed marijuana use and that I would strongly recommend he avoid using marijuana, especially during Delayed Intensification due to risk for fungal infection.  13) RTC in one week for Day 43 therapy.       YOHAN Dunn CNP

## 2020-03-27 LAB
APPEARANCE CSF: CLEAR
COLOR CSF: COLORLESS
RBC # CSF MANUAL: 0 /UL (ref 0–2)
TUBE # CSF: 2 #
WBC # CSF MANUAL: 0 /UL (ref 0–5)

## 2020-03-27 NOTE — PROGRESS NOTES
This is a recent snapshot of the patient's [facility] medical record.  For current drug dose and complete information and questions, call [phone] or In Basket pool, [facility] ([id number])  CSN Number:  [id number]

## 2020-04-01 ENCOUNTER — TELEPHONE (OUTPATIENT)
Dept: PEDIATRIC HEMATOLOGY/ONCOLOGY | Facility: CLINIC | Age: 20
End: 2020-04-01

## 2020-04-01 NOTE — TELEPHONE ENCOUNTER
Left a voicemail for Lazaro letting him know that he needs to complete a wellness check before his appointment tomorrow. I left the phone number for the  at [facility].     Josie Hernández LPN  April 1, 2020

## 2020-04-02 ENCOUNTER — OFFICE VISIT (OUTPATIENT)
Dept: PEDIATRIC HEMATOLOGY/ONCOLOGY | Facility: CLINIC | Age: 20
End: 2020-04-02
Attending: NURSE PRACTITIONER
Payer: COMMERCIAL

## 2020-04-02 ENCOUNTER — INFUSION THERAPY VISIT (OUTPATIENT)
Dept: INFUSION THERAPY | Facility: CLINIC | Age: 20
End: 2020-04-02
Attending: NURSE PRACTITIONER
Payer: COMMERCIAL

## 2020-04-02 VITALS
WEIGHT: 193.34 LBS | HEART RATE: 92 BPM | SYSTOLIC BLOOD PRESSURE: 125 MMHG | RESPIRATION RATE: 16 BRPM | DIASTOLIC BLOOD PRESSURE: 70 MMHG | TEMPERATURE: 98.5 F | HEIGHT: 72 IN | BODY MASS INDEX: 26.19 KG/M2 | OXYGEN SATURATION: 100 %

## 2020-04-02 DIAGNOSIS — C83.50 T LYMPHOBLASTIC LYMPHOMA (H): Primary | ICD-10-CM

## 2020-04-02 DIAGNOSIS — Z51.11 ENCOUNTER FOR ANTINEOPLASTIC CHEMOTHERAPY: ICD-10-CM

## 2020-04-02 LAB
ABO + RH BLD: NORMAL
ABO + RH BLD: NORMAL
BASOPHILS # BLD AUTO: 0 10E9/L (ref 0–0.2)
BASOPHILS NFR BLD AUTO: 0 %
BLD GP AB SCN SERPL QL: NORMAL
BLD PROD TYP BPU: NORMAL
BLOOD BANK CMNT PATIENT-IMP: NORMAL
DIFFERENTIAL METHOD BLD: ABNORMAL
EOSINOPHIL # BLD AUTO: 0.1 10E9/L (ref 0–0.7)
EOSINOPHIL NFR BLD AUTO: 1.4 %
ERYTHROCYTE [DISTWIDTH] IN BLOOD BY AUTOMATED COUNT: 11.9 % (ref 10–15)
HCT VFR BLD AUTO: 31.4 % (ref 40–53)
HGB BLD-MCNC: 10.7 G/DL (ref 13.3–17.7)
IMM GRANULOCYTES # BLD: 0 10E9/L (ref 0–0.4)
IMM GRANULOCYTES NFR BLD: 0.2 %
LYMPHOCYTES # BLD AUTO: 0.6 10E9/L (ref 0.8–5.3)
LYMPHOCYTES NFR BLD AUTO: 15.1 %
MCH RBC QN AUTO: 30.6 PG (ref 26.5–33)
MCHC RBC AUTO-ENTMCNC: 34.1 G/DL (ref 31.5–36.5)
MCV RBC AUTO: 90 FL (ref 78–100)
MONOCYTES # BLD AUTO: 0.1 10E9/L (ref 0–1.3)
MONOCYTES NFR BLD AUTO: 3.3 %
NEUTROPHILS # BLD AUTO: 3.4 10E9/L (ref 1.6–8.3)
NEUTROPHILS NFR BLD AUTO: 80 %
NRBC # BLD AUTO: 0 10*3/UL
NRBC BLD AUTO-RTO: 0 /100
NUM BPU REQUESTED: 2
PLATELET # BLD AUTO: 52 10E9/L (ref 150–450)
RBC # BLD AUTO: 3.5 10E12/L (ref 4.4–5.9)
SPECIMEN EXP DATE BLD: NORMAL
WBC # BLD AUTO: 4.3 10E9/L (ref 4–11)

## 2020-04-02 PROCEDURE — 86901 BLOOD TYPING SEROLOGIC RH(D): CPT | Performed by: NURSE PRACTITIONER

## 2020-04-02 PROCEDURE — 96409 CHEMO IV PUSH SNGL DRUG: CPT

## 2020-04-02 PROCEDURE — 86850 RBC ANTIBODY SCREEN: CPT | Performed by: NURSE PRACTITIONER

## 2020-04-02 PROCEDURE — 86900 BLOOD TYPING SEROLOGIC ABO: CPT | Performed by: NURSE PRACTITIONER

## 2020-04-02 PROCEDURE — 85025 COMPLETE CBC W/AUTO DIFF WBC: CPT | Performed by: PEDIATRICS

## 2020-04-02 PROCEDURE — 25800030 ZZH RX IP 258 OP 636: Mod: ZF | Performed by: PEDIATRICS

## 2020-04-02 PROCEDURE — 25000128 H RX IP 250 OP 636: Mod: ZF | Performed by: PEDIATRICS

## 2020-04-02 PROCEDURE — 25000128 H RX IP 250 OP 636: Mod: ZF

## 2020-04-02 RX ORDER — HEPARIN SODIUM (PORCINE) LOCK FLUSH IV SOLN 100 UNIT/ML 100 UNIT/ML
5 SOLUTION INTRAVENOUS
Status: DISCONTINUED | OUTPATIENT
Start: 2020-04-02 | End: 2020-04-02 | Stop reason: HOSPADM

## 2020-04-02 RX ORDER — HEPARIN SODIUM (PORCINE) LOCK FLUSH IV SOLN 100 UNIT/ML 100 UNIT/ML
SOLUTION INTRAVENOUS
Status: COMPLETED
Start: 2020-04-02 | End: 2020-04-02

## 2020-04-02 RX ADMIN — VINCRISTINE SULFATE 2 MG: 1 INJECTION, SOLUTION INTRAVENOUS at 12:00

## 2020-04-02 RX ADMIN — HEPARIN SODIUM (PORCINE) LOCK FLUSH IV SOLN 100 UNIT/ML 5 ML: 100 SOLUTION at 12:00

## 2020-04-02 RX ADMIN — HEPARIN 5 ML: 100 SYRINGE at 12:00

## 2020-04-02 ASSESSMENT — MIFFLIN-ST. JEOR: SCORE: 1927

## 2020-04-02 ASSESSMENT — PAIN SCALES - GENERAL: PAINLEVEL: NO PAIN (0)

## 2020-04-02 NOTE — PROGRESS NOTES
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 Lazaro presented with acute onset cough and was found to have an anterior mediastinal mass and malignant left-sided pleural effusion.  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  He was admitted to [facility] oncology service 8/30 and started on treatment per [id number].  He had a pleural effusion that required a chest tube and a pericardial effusion that was not drained. His Induction was complicated by cardiac compression secondary to his mass leading to tamponade, this improved through out his hospital stay.  He also had dysphagia that improved with treatment of his mass, swallow study was normal. His course was recently complicated by extensive bilateral UE DVTs, he remains on anticoagulation.  Most recently his course was complicated by the development of severe asparaginase induced pancreatitis. He became quite ill with SIRS and associated hypotension, he required pressor support in the ICU.  Fortunately Lazaro recovered well and his subsequent imaging has continued to show improvement.  He comes to clinic today for Day 43 of Delayed Intensification.    HPI:   Lazaro comes to clinic today by himself.  He has been feeling very well since his last visit. He even notes that it has been a very long time since he felt this good. He denies nausea or vomiting (although reluctantly does admit that every time he arrives at the hospital for a clinic visit, he does develop some nausea just before coming up to clinic). His appetite is good and his energy level is very high. He is looking for safe ways to be active without risking getting sick. He denies fever or other ill symptoms. He has not had any issues with voiding or stooling. He denies numbness, tingling, pain, or weakness in his extremities. No mucositis. No skin concerns. He is  sleeping very well at night (although thinks he should start going to be earlier).     Review of systems:  Pertinent positives reported in the HPI. All other systems in a complete and comprehensive review of systems were negative..    PMH:   Past Medical History:   Diagnosis Date     Acute necrotizing pancreatitis 11/07/2019    attributed to asparaginase     Acute pancreatitis due to PEGaspariginase therapy  11/15/2019     DVT of upper extremity (deep vein thrombosis) (H) 09/26/2019    Bilateral      Edema of upper extremity 10/17/2019     Folliculitis 10/17/2019     Migraine 2006    have resolved     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   -- Spinal HA following LP  -- TPMT shows Intermediate activity with normal TPMT/NUDT15 genotype  -- Factor V leiden and prothrombin gene mutation negative  -- Necrotizing pancreatitis secondary to asparaginase    PFMH:   Family History   Problem Relation Age of Onset     No Known Problems Mother      No Known Problems Father      Asthma Brother      Thyroid Cancer Paternal Grandmother      Melanoma Paternal Aunt      Social History: Lazaro previously lived at home with his dad and step mom in [place] but is now staying with his mom in [place].  He has stopped working since [employer] restriction went into place.    Current Medications:  Current Outpatient Medications on File Prior to Visit   Medication Sig Dispense Refill     cytarabine, PF, (CYTOSAR) 100 MG/ML injection Inject 1.5 mLs (150 mg) Subcutaneous daily for 3 days Start day after clinic dose. 4.5 mL 0     cytarabine, PF, (CYTOSAR) 100 MG/ML injection Inject 1.5 mLs (150 mg) Subcutaneous daily for 3 days Start day after clinic dose. 4.5 mL 0     diphenhydrAMINE (BENADRYL) 25 MG capsule Take 1-2 capsules (25-50 mg) by mouth every 6 hours as needed (Breakthrough Nausea and Vomiting ) 30 capsule 1     nicotine 14 MG/24HR TD 24 hr patch Place 1 patch onto the skin every 24 hours 42 patch 0     ondansetron (ZOFRAN-ODT) 8  "MG ODT tab Take 1 tablet (8 mg) by mouth every 8 hours as needed for nausea 20 tablet 6     pantoprazole 40 MG PO EC tablet Take 1 tablet (40 mg) by mouth every morning (before breakfast) 30 tablet 2     polyethylene glycol (MIRALAX/GLYCOLAX) packet Take 17 g by mouth daily 30 packet 0     scopolamine (TRANSDERM) 1 MG/3DAYS 72 hr patch Place 1 patch onto the skin every 72 hours 10 patch 3     sennosides (SENOKOT) 8.6 MG tablet Take 2 tablets by mouth 2 times daily as needed for constipation 60 each 1     sulfamethoxazole-trimethoprim (BACTRIM DS) 800-160 MG tablet Take 1 tablet by mouth Every Mon, Tues two times daily 16 tablet 11     thioguanine (TABLOID) 40 MG tablet Take 3 tablets Mon - Sat and 3.5 tablets on Sunday. Days: 29 through 42. 43 tablet 0     Vitamin D, Cholecalciferol, 25 MCG (1000 UT) TABS Take 2 tablets (2,000 Units) by mouth daily 60 tablet 3   Lazaro reports he is taking bactrim and protonix regularly.  No missed doses of home cytarabine or thioguanine. He stopped taking his thioguanine yesterday as instructed.     Received influenza vaccine for the 4438-3057 season.      Physical Exam:   Temp:  [98.5  F (36.9  C)] 98.5  F (36.9  C)  Pulse:  [92] 92  Resp:  [16] 16  BP: (125)/(70) 125/70  SpO2:  [100 %] 100 %     Wt Readings from Last 4 Encounters:   04/02/20 87.7 kg (193 lb 5.5 oz)   03/26/20 85.3 kg (188 lb 0.8 oz)   03/26/20 85.3 kg (188 lb 0.8 oz)   03/19/20 88.2 kg (194 lb 7.1 oz)     Ht Readings from Last 2 Encounters:   04/02/20 1.84 m (6' 0.44\")   03/26/20 1.842 m (6' 0.52\")     Lazaro is now above his baseline weight of 180 lbs.  General: Lazaro is alert, interactive and appropriate; bright affect today and very talkative  HEENT: Skull is atrauamatic and normocephalic.  Full head of hair. PERRLA, sclera are non icteric and not injected, EOM are intact, gaze slightly disconjugate which is his baseline. Nares are patent without drainage. Oropharynx clear, no plaques noted. Buccal mucosa and " tongue clear. MMM.  Neck:  Supple without lymphadenopathy.   Lymph: There is no cervical, supraclavicular, axillary, lymphadenopathy palpated.  Cardiovascular:  HR is regular, S1, S2 no murmur.  Capillary refill is < 2 seconds. Right arm without edema or erythema.  No pitting edema.  Cap refill < 2 sec. Peripheral pulses 2+/=. He has mildly distended veins noted on the left upper chest, not extending up to the neck, overlying the pectoralis.   Respiratory: No cough noted. Respirations are easy.  Lungs are clear to auscultation throughout.  No crackles or wheezes.  Gastrointestinal:  BS present in all quadrants.  Abdomen is soft and flat.  Lazaro denies LUQ discomfort, no pain with palpation. No hepatosplenomegaly or masses are palpated.  Skin: No skin lesions noted.  Neurological:  CN 2-12 grossly intact. Gait is normal.  No issues with balance. Sensation intact in hands and feet.     Musculoskeletal:  Good strength and ROM in all extremities.  Strong dorsiflexion at ankles and great toes (5/5) bilaterally without any pain at the Achilles.    Labs:   Results for orders placed or performed in visit on 04/02/20   CBC with platelets differential     Status: Abnormal   Result Value Ref Range    WBC 4.3 4.0 - 11.0 10e9/L    RBC Count 3.50 (L) 4.4 - 5.9 10e12/L    Hemoglobin 10.7 (L) 13.3 - 17.7 g/dL    Hematocrit 31.4 (L) 40.0 - 53.0 %    MCV 90 78 - 100 fl    MCH 30.6 26.5 - 33.0 pg    MCHC 34.1 31.5 - 36.5 g/dL    RDW 11.9 10.0 - 15.0 %    Platelet Count 52 (L) 150 - 450 10e9/L    Diff Method Automated Method     % Neutrophils 80.0 %    % Lymphocytes 15.1 %    % Monocytes 3.3 %    % Eosinophils 1.4 %    % Basophils 0.0 %    % Immature Granulocytes 0.2 %    Nucleated RBCs 0 0 /100    Absolute Neutrophil 3.4 1.6 - 8.3 10e9/L    Absolute Lymphocytes 0.6 (L) 0.8 - 5.3 10e9/L    Absolute Monocytes 0.1 0.0 - 1.3 10e9/L    Absolute Eosinophils 0.1 0.0 - 0.7 10e9/L    Absolute Basophils 0.0 0.0 - 0.2 10e9/L    Abs Immature  Granulocytes 0.0 0 - 0.4 10e9/L    Absolute Nucleated RBC 0.0        Assessment:  Lazaro Lund is a 19 year old young man with T cell lymphoblastic lymphoma (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  He is being treated per [facility]G protocol [id number].   He's had a CRu following Induction with a CR at the end of Consolidation. His course has been complicated by extensive bilateral DVT and severe necrotizing pancreatitis.  He completed treatment with lovenox. Recent abdominal CT shows continued improvement in regards to the sequelae from his pancreatitis.  Asparaginase will be permanently discontinued from his treatment regimen. He is on Vit D for deficiency.  He comes to clinic today for Day 43 of Delayed Intensification.  He's had nausea and vomiting with this course, but that has resolved and he feels and appears well today based on history and exam. Blood counts are generally good, although he now has evolving thrombocytopenia and anemia. He continues to work on smoke cessation.      Plan:   1) Reviewed labs with Lazaro, no transfusions needed; reviewed symptoms of thrombocytopenia including bleeding and petechial rash and advised Lazaro to call if he experiences them  2) Day 43 vincristine given in clinic  3) 6TG completed on 4/1/20.  4) Continue to monitor for new symptoms of thrombosis while off of lovenox. Low threshold to repeat U/S if any signs of thrombus.   5) Lazaro will have a repeat CT at the end of April per Dr Coronel. His last note indicates he would like to follow him with imaging until he has complete resolution.    6) Continue Vit D 3 2000 IU daily (last level was 19 on 3/5/20).  Recheck level in August.  7) Day 29 Induction LP deferred, plan to make-up dose in Maintenance therapy.  8) TPMT and NUDT15 genotype is normal, plan for regular dose thiopurines.  9) Given significant spinal headache history will plan for IV caffeine following LPs in the future.   10) Lazaro is doing well with smoking cessation, he has  adequate nicotine patches, continue to follow.  Have discussed marijuana use and continue to advice against smoking it, given risk for pulmonary fungal infection   11) RTC in one week for Day 50 therapy including vincristine.    Reynaldo Campuzano MD  Pediatric Hematology/Oncology  [facility]  Pager [phone]

## 2020-04-02 NOTE — LETTER
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 Lazaro presented with acute onset cough and was found to have an anterior mediastinal mass and malignant left-sided pleural effusion.  A CT guided biopsy was obtained at [facility] and pathology was consistent with T-cell leukemia vs lymphoma.  He was admitted to [facility] oncology service 8/30 and started on treatment per [id number].  He had a pleural effusion that required a chest tube and a pericardial effusion that was not drained. His Induction was complicated by cardiac compression secondary to his mass leading to tamponade, this improved through out his hospital stay.  He also had dysphagia that improved with treatment of his mass, swallow study was normal. His course was recently complicated by extensive bilateral UE DVTs, he remains on anticoagulation.  Most recently his course was complicated by the development of severe asparaginase induced pancreatitis. He became quite ill with SIRS and associated hypotension, he required pressor support in the ICU.  Fortunately Lazaro recovered well and his subsequent imaging has continued to show improvement.  He comes to clinic today for Day 43 of Delayed Intensification.    HPI:   Lazaro comes to clinic today by himself.  He has been feeling very well since his last visit. He even notes that it has been a very long time since he felt this good. He denies nausea or vomiting (although reluctantly does admit that every time he arrives at the hospital for a clinic visit, he does develop some nausea just before coming up to clinic). His appetite is good and his energy level is very high. He is looking for safe ways to be active without risking getting sick. He denies fever or other ill symptoms. He has not had any issues with voiding or stooling. He denies numbness,  tingling, pain, or weakness in his extremities. No mucositis. No skin concerns. He is sleeping very well at night (although thinks he should start going to be earlier).     Review of systems:  Pertinent positives reported in the HPI. All other systems in a complete and comprehensive review of systems were negative..    PMH:   Past Medical History:   Diagnosis Date     Acute necrotizing pancreatitis 11/07/2019    attributed to asparaginase     Acute pancreatitis due to PEGaspariginase therapy  11/15/2019     DVT of upper extremity (deep vein thrombosis) (H) 09/26/2019    Bilateral      Edema of upper extremity 10/17/2019     Folliculitis 10/17/2019     Migraine 2006    have resolved     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   -- Spinal HA following LP  -- TPMT shows Intermediate activity with normal TPMT/NUDT15 genotype  -- Factor V leiden and prothrombin gene mutation negative  -- Necrotizing pancreatitis secondary to asparaginase    PFMH:   Family History   Problem Relation Age of Onset     No Known Problems Mother      No Known Problems Father      Asthma Brother      Thyroid Cancer Paternal Grandmother      Melanoma Paternal Aunt      Social History: Lazaro previously lived at home with his dad and step mom in [place] but is now staying with his mom in [place].  He has stopped working since [employer] restriction went into place.    Current Medications:  Current Outpatient Medications on File Prior to Visit   Medication Sig Dispense Refill     cytarabine, PF, (CYTOSAR) 100 MG/ML injection Inject 1.5 mLs (150 mg) Subcutaneous daily for 3 days Start day after clinic dose. 4.5 mL 0     cytarabine, PF, (CYTOSAR) 100 MG/ML injection Inject 1.5 mLs (150 mg) Subcutaneous daily for 3 days Start day after clinic dose. 4.5 mL 0     diphenhydrAMINE (BENADRYL) 25 MG capsule Take 1-2 capsules (25-50 mg) by mouth every 6 hours as needed (Breakthrough Nausea and Vomiting ) 30 capsule 1     nicotine 14 MG/24HR TD 24 hr patch  "Place 1 patch onto the skin every 24 hours 42 patch 0     ondansetron (ZOFRAN-ODT) 8 MG ODT tab Take 1 tablet (8 mg) by mouth every 8 hours as needed for nausea 20 tablet 6     pantoprazole 40 MG PO EC tablet Take 1 tablet (40 mg) by mouth every morning (before breakfast) 30 tablet 2     polyethylene glycol (MIRALAX/GLYCOLAX) packet Take 17 g by mouth daily 30 packet 0     scopolamine (TRANSDERM) 1 MG/3DAYS 72 hr patch Place 1 patch onto the skin every 72 hours 10 patch 3     sennosides (SENOKOT) 8.6 MG tablet Take 2 tablets by mouth 2 times daily as needed for constipation 60 each 1     sulfamethoxazole-trimethoprim (BACTRIM DS) 800-160 MG tablet Take 1 tablet by mouth Every Mon, Tues two times daily 16 tablet 11     thioguanine (TABLOID) 40 MG tablet Take 3 tablets Mon - Sat and 3.5 tablets on Sunday. Days: 29 through 42. 43 tablet 0     Vitamin D, Cholecalciferol, 25 MCG (1000 UT) TABS Take 2 tablets (2,000 Units) by mouth daily 60 tablet 3   Lazaro reports he is taking bactrim and protonix regularly.  No missed doses of home cytarabine or thioguanine. He stopped taking his thioguanine yesterday as instructed.     Received influenza vaccine for the 3054-0340 season.      Physical Exam:   Temp:  [98.5  F (36.9  C)] 98.5  F (36.9  C)  Pulse:  [92] 92  Resp:  [16] 16  BP: (125)/(70) 125/70  SpO2:  [100 %] 100 %     Wt Readings from Last 4 Encounters:   04/02/20 87.7 kg (193 lb 5.5 oz)   03/26/20 85.3 kg (188 lb 0.8 oz)   03/26/20 85.3 kg (188 lb 0.8 oz)   03/19/20 88.2 kg (194 lb 7.1 oz)     Ht Readings from Last 2 Encounters:   04/02/20 1.84 m (6' 0.44\")   03/26/20 1.842 m (6' 0.52\")     Lazaro is now above his baseline weight of 180 lbs.  General: Lazaro is alert, interactive and appropriate; bright affect today and very talkative  HEENT: Skull is atrauamatic and normocephalic.  Full head of hair. PERRLA, sclera are non icteric and not injected, EOM are intact, gaze slightly disconjugate which is his baseline. " Nares are patent without drainage. Oropharynx clear, no plaques noted. Buccal mucosa and tongue clear. MMM.  Neck:  Supple without lymphadenopathy.   Lymph: There is no cervical, supraclavicular, axillary, lymphadenopathy palpated.  Cardiovascular:  HR is regular, S1, S2 no murmur.  Capillary refill is < 2 seconds. Right arm without edema or erythema.  No pitting edema.  Cap refill < 2 sec. Peripheral pulses 2+/=. He has mildly distended veins noted on the left upper chest, not extending up to the neck, overlying the pectoralis.   Respiratory: No cough noted. Respirations are easy.  Lungs are clear to auscultation throughout.  No crackles or wheezes.  Gastrointestinal:  BS present in all quadrants.  Abdomen is soft and flat.  Lazaro denies LUQ discomfort, no pain with palpation. No hepatosplenomegaly or masses are palpated.  Skin: No skin lesions noted.  Neurological:  CN 2-12 grossly intact. Gait is normal.  No issues with balance. Sensation intact in hands and feet.     Musculoskeletal:  Good strength and ROM in all extremities.  Strong dorsiflexion at ankles and great toes (5/5) bilaterally without any pain at the Achilles.    Labs:   Results for orders placed or performed in visit on 04/02/20   CBC with platelets differential     Status: Abnormal   Result Value Ref Range    WBC 4.3 4.0 - 11.0 10e9/L    RBC Count 3.50 (L) 4.4 - 5.9 10e12/L    Hemoglobin 10.7 (L) 13.3 - 17.7 g/dL    Hematocrit 31.4 (L) 40.0 - 53.0 %    MCV 90 78 - 100 fl    MCH 30.6 26.5 - 33.0 pg    MCHC 34.1 31.5 - 36.5 g/dL    RDW 11.9 10.0 - 15.0 %    Platelet Count 52 (L) 150 - 450 10e9/L    Diff Method Automated Method     % Neutrophils 80.0 %    % Lymphocytes 15.1 %    % Monocytes 3.3 %    % Eosinophils 1.4 %    % Basophils 0.0 %    % Immature Granulocytes 0.2 %    Nucleated RBCs 0 0 /100    Absolute Neutrophil 3.4 1.6 - 8.3 10e9/L    Absolute Lymphocytes 0.6 (L) 0.8 - 5.3 10e9/L    Absolute Monocytes 0.1 0.0 - 1.3 10e9/L    Absolute  Eosinophils 0.1 0.0 - 0.7 10e9/L    Absolute Basophils 0.0 0.0 - 0.2 10e9/L    Abs Immature Granulocytes 0.0 0 - 0.4 10e9/L    Absolute Nucleated RBC 0.0        Assessment:  Lazaro Lund is a 19 year old young man with T cell lymphoblastic lymphoma (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  He is being treated per COG protocol [id number].   He's had a CRu following Induction with a CR at the end of Consolidation. His course has been complicated by extensive bilateral DVT and severe necrotizing pancreatitis.  He completed treatment with lovenox. Recent abdominal CT shows continued improvement in regards to the sequelae from his pancreatitis.  Asparaginase will be permanently discontinued from his treatment regimen. He is on Vit D for deficiency.  He comes to clinic today for Day 43 of Delayed Intensification.  He's had nausea and vomiting with this course, but that has resolved and he feels and appears well today based on history and exam. Blood counts are generally good, although he now has evolving thrombocytopenia and anemia. He continues to work on smoke cessation.      Plan:   1) Reviewed labs with Lazaro, no transfusions needed; reviewed symptoms of thrombocytopenia including bleeding and petechial rash and advised Lazaro to call if he experiences them  2) Day 43 vincristine given in clinic  3) 6TG completed on 4/1/20.  4) Continue to monitor for new symptoms of thrombosis while off of lovenox. Low threshold to repeat U/S if any signs of thrombus.   5) Lazaro will have a repeat CT at the end of April per Dr Coronel. His last note indicates he would like to follow him with imaging until he has complete resolution.    6) Continue Vit D 3 2000 IU daily (last level was 19 on 3/5/20).  Recheck level in August.  7) Day 29 Induction LP deferred, plan to make-up dose in Maintenance therapy.  8) TPMT and NUDT15 genotype is normal, plan for regular dose thiopurines.  9) Given significant spinal headache history will plan for IV  caffeine following LPs in the future.   10) Lazaro is doing well with smoking cessation, he has adequate nicotine patches, continue to follow.  Have discussed marijuana use and continue to advice against smoking it, given risk for pulmonary fungal infection   11) RTC in one week for Day 50 therapy including vincristine.    Reynaldo Campuzano MD  Pediatric Hematology/Oncology  [facility]  Pager [phone]       Reynaldo Campuzano MD

## 2020-04-02 NOTE — PROGRESS NOTES
Infusion Nursing Note    Lazaro Lund Presents to [facility]fusion [facility] today for: Day 43 Cycle 1    Due to : T lymphoblastic lymphoma (H)    Intravenous Access/Labs: Port accessed using sterile technique; accessed by Karey QUEVEDO RN. Labs drawn.    Coping:   Child Family Life declined    Infusion Note: Pt arrived to clinic; denies any recent fever/infections. Port accessed and labs drawn. Pt seen by Dr. Campuzano in clinic. Vincristine given by gravity without issues; + blood return noted pre/mid/post. Port heparin locked and de-accessed. No need for transfusion per Dr. Campuzano. VSS.    Discharge Plan:   Patient verbalized understanding of discharge instructions.  RN reviewed that pt should return to clinic on 4/9/2020.  Pt left [facility] in stable condition.

## 2020-04-08 ENCOUNTER — TELEPHONE (OUTPATIENT)
Dept: PEDIATRIC HEMATOLOGY/ONCOLOGY | Facility: CLINIC | Age: 20
End: 2020-04-08

## 2020-04-08 LAB
BLD PROD TYP BPU: NORMAL
NUM BPU REQUESTED: 1

## 2020-04-08 NOTE — TELEPHONE ENCOUNTER
Tried to call the patient about their wellness screening for their appointment tomorrow. There was no answer, left a message for them to call back.

## 2020-04-09 ENCOUNTER — OFFICE VISIT (OUTPATIENT)
Dept: PEDIATRIC HEMATOLOGY/ONCOLOGY | Facility: CLINIC | Age: 20
End: 2020-04-09
Attending: PEDIATRICS
Payer: COMMERCIAL

## 2020-04-09 ENCOUNTER — INFUSION THERAPY VISIT (OUTPATIENT)
Dept: INFUSION THERAPY | Facility: CLINIC | Age: 20
End: 2020-04-09
Attending: PEDIATRICS
Payer: COMMERCIAL

## 2020-04-09 VITALS
HEART RATE: 95 BPM | SYSTOLIC BLOOD PRESSURE: 123 MMHG | TEMPERATURE: 98.5 F | OXYGEN SATURATION: 98 % | RESPIRATION RATE: 18 BRPM | DIASTOLIC BLOOD PRESSURE: 71 MMHG | WEIGHT: 189.82 LBS | HEIGHT: 72 IN | BODY MASS INDEX: 25.71 KG/M2

## 2020-04-09 DIAGNOSIS — C83.50 T LYMPHOBLASTIC LYMPHOMA (H): Primary | ICD-10-CM

## 2020-04-09 LAB
ABO + RH BLD: NORMAL
ABO + RH BLD: NORMAL
BASOPHILS # BLD AUTO: 0 10E9/L (ref 0–0.2)
BASOPHILS NFR BLD AUTO: 0 %
BLD GP AB SCN SERPL QL: NORMAL
BLOOD BANK CMNT PATIENT-IMP: NORMAL
DIFFERENTIAL METHOD BLD: ABNORMAL
EOSINOPHIL # BLD AUTO: 0 10E9/L (ref 0–0.7)
EOSINOPHIL NFR BLD AUTO: 2.8 %
ERYTHROCYTE [DISTWIDTH] IN BLOOD BY AUTOMATED COUNT: 11.8 % (ref 10–15)
HCT VFR BLD AUTO: 28.6 % (ref 40–53)
HGB BLD-MCNC: 10.1 G/DL (ref 13.3–17.7)
IMM GRANULOCYTES # BLD: 0 10E9/L (ref 0–0.4)
IMM GRANULOCYTES NFR BLD: 0 %
LYMPHOCYTES # BLD AUTO: 0.9 10E9/L (ref 0.8–5.3)
LYMPHOCYTES NFR BLD AUTO: 61.3 %
MCH RBC QN AUTO: 30 PG (ref 26.5–33)
MCHC RBC AUTO-ENTMCNC: 35.3 G/DL (ref 31.5–36.5)
MCV RBC AUTO: 85 FL (ref 78–100)
MICROCYTES BLD QL SMEAR: PRESENT
MONOCYTES # BLD AUTO: 0.1 10E9/L (ref 0–1.3)
MONOCYTES NFR BLD AUTO: 8.5 %
NEUTROPHILS # BLD AUTO: 0.4 10E9/L (ref 1.6–8.3)
NEUTROPHILS NFR BLD AUTO: 27.4 %
NRBC # BLD AUTO: 0 10*3/UL
NRBC BLD AUTO-RTO: 0 /100
PLATELET # BLD AUTO: 71 10E9/L (ref 150–450)
PLATELET # BLD EST: ABNORMAL 10*3/UL
POIKILOCYTOSIS BLD QL SMEAR: SLIGHT
RBC # BLD AUTO: 3.37 10E12/L (ref 4.4–5.9)
SPECIMEN EXP DATE BLD: NORMAL
TARGETS BLD QL SMEAR: ABNORMAL
WBC # BLD AUTO: 1.5 10E9/L (ref 4–11)

## 2020-04-09 PROCEDURE — 96409 CHEMO IV PUSH SNGL DRUG: CPT

## 2020-04-09 PROCEDURE — 25000128 H RX IP 250 OP 636: Mod: ZF

## 2020-04-09 PROCEDURE — 86900 BLOOD TYPING SEROLOGIC ABO: CPT | Performed by: NURSE PRACTITIONER

## 2020-04-09 PROCEDURE — 25800030 ZZH RX IP 258 OP 636: Mod: ZF | Performed by: PEDIATRICS

## 2020-04-09 PROCEDURE — 25000125 ZZHC RX 250: Mod: ZF

## 2020-04-09 PROCEDURE — 86901 BLOOD TYPING SEROLOGIC RH(D): CPT | Performed by: NURSE PRACTITIONER

## 2020-04-09 PROCEDURE — 85004 AUTOMATED DIFF WBC COUNT: CPT | Performed by: PEDIATRICS

## 2020-04-09 PROCEDURE — 85027 COMPLETE CBC AUTOMATED: CPT | Performed by: PEDIATRICS

## 2020-04-09 PROCEDURE — 25000128 H RX IP 250 OP 636: Mod: ZF | Performed by: PEDIATRICS

## 2020-04-09 PROCEDURE — 86850 RBC ANTIBODY SCREEN: CPT | Performed by: NURSE PRACTITIONER

## 2020-04-09 RX ORDER — LIDOCAINE/PRILOCAINE 2.5 %-2.5%
CREAM (GRAM) TOPICAL PRN
Qty: 30 G | Refills: 6 | Status: SHIPPED | OUTPATIENT
Start: 2020-04-09 | End: 2022-01-07

## 2020-04-09 RX ORDER — HEPARIN SODIUM (PORCINE) LOCK FLUSH IV SOLN 100 UNIT/ML 100 UNIT/ML
SOLUTION INTRAVENOUS
Status: COMPLETED
Start: 2020-04-09 | End: 2020-04-09

## 2020-04-09 RX ORDER — HEPARIN SODIUM (PORCINE) LOCK FLUSH IV SOLN 100 UNIT/ML 100 UNIT/ML
5 SOLUTION INTRAVENOUS
Status: DISCONTINUED | OUTPATIENT
Start: 2020-04-09 | End: 2020-04-09 | Stop reason: HOSPADM

## 2020-04-09 RX ADMIN — HEPARIN SODIUM (PORCINE) LOCK FLUSH IV SOLN 100 UNIT/ML 5 ML: 100 SOLUTION at 10:52

## 2020-04-09 RX ADMIN — HEPARIN 5 ML: 100 SYRINGE at 10:52

## 2020-04-09 RX ADMIN — LIDOCAINE HYDROCHLORIDE: 3 GEL TOPICAL at 10:50

## 2020-04-09 RX ADMIN — VINCRISTINE SULFATE 2 MG: 1 INJECTION, SOLUTION INTRAVENOUS at 11:32

## 2020-04-09 ASSESSMENT — MIFFLIN-ST. JEOR
SCORE: 1904.13
SCORE: 1904.13

## 2020-04-09 ASSESSMENT — PAIN SCALES - GENERAL: PAINLEVEL: NO PAIN (0)

## 2020-04-09 NOTE — LETTER
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 Lazaro presented with acute onset cough and was found to have an anterior mediastinal mass and malignant left-sided pleural effusion.  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  He was admitted to [facility] oncology service 8/30 and started on treatment per [id number].  He had a pleural effusion that required a chest tube and a pericardial effusion that was not drained. His Induction was complicated by cardiac compression secondary to his mass leading to tamponade, this improved through out his hospital stay.  He also had dysphagia that improved with treatment of his mass, swallow study was normal. His course was recently complicated by extensive bilateral UE DVTs, he remains on anticoagulation.  Most recently his course was complicated by the development of severe asparaginase induced pancreatitis. He became quite ill with SIRS and associated hypotension, he required pressor support in the ICU.  Fortunately Lazaro recovered well and his subsequent imaging has continued to show improvement.  He comes to clinic today for Day 50 of Delayed Intensification.    HPI:   Lazaro comes to clinic today by himself.  He has been doing really well since his last visit.  He does not have any complaints today.  Energy level is good, if anything he is bored.  He is eating well, no GI upset, no abdominal pain.  Passing soft stool without need for laxatives.  Denies skin concerns or pain.  No fever.  No paresthesias.  Sleeping well at night, mood is good.    Lazaro has done well with smoking cessation.  He is no longer using patches.  His mom is living elsewhere to avoid exposure to his grandma and he feels this has been helpful since his mom is a smoker and being  around it would tempt him.    Review of systems:  Pertinent positives reported in the HPI. All other systems in a complete and comprehensive review of systems were negative..    PMH:   Past Medical History:   Diagnosis Date     Acute necrotizing pancreatitis 11/07/2019    attributed to asparaginase     Acute pancreatitis due to PEGaspariginase therapy  11/15/2019     DVT of upper extremity (deep vein thrombosis) (H) 09/26/2019    Bilateral      Edema of upper extremity 10/17/2019     Folliculitis 10/17/2019     Migraine 2006    have resolved     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   -- Spinal HA following LP  -- TPMT shows Intermediate activity with normal TPMT/NUDT15 genotype  -- Factor V leiden and prothrombin gene mutation negative  -- Necrotizing pancreatitis secondary to asparaginase    PFMH:   Family History   Problem Relation Age of Onset     No Known Problems Mother      No Known Problems Father      Asthma Brother      Thyroid Cancer Paternal Grandmother      Melanoma Paternal Aunt      Social History: Lazaro previously lived at home with his dad and step mom in [place] but is now staying with his grandma in [place].  He has stopped working since [employer] restriction went into place.    Current Medications:  Current Outpatient Medications   Medication Sig Dispense Refill     cytarabine, PF, (CYTOSAR) 100 MG/ML injection Inject 1.5 mLs (150 mg) Subcutaneous daily for 3 days Start day after clinic dose. 4.5 mL 0     cytarabine, PF, (CYTOSAR) 100 MG/ML injection Inject 1.5 mLs (150 mg) Subcutaneous daily for 3 days Start day after clinic dose. 4.5 mL 0     diphenhydrAMINE (BENADRYL) 25 MG capsule Take 1-2 capsules (25-50 mg) by mouth every 6 hours as needed (Breakthrough Nausea and Vomiting ) 30 capsule 1     ondansetron (ZOFRAN-ODT) 8 MG ODT tab Take 1 tablet (8 mg) by mouth every 8 hours as needed for nausea 20 tablet 6     pantoprazole 40 MG PO EC tablet Take 1 tablet (40 mg) by mouth every morning  "(before breakfast) 30 tablet 2     polyethylene glycol (MIRALAX/GLYCOLAX) packet Take 17 g by mouth daily 30 packet 0     scopolamine (TRANSDERM) 1 MG/3DAYS 72 hr patch Place 1 patch onto the skin every 72 hours 10 patch 3     sennosides (SENOKOT) 8.6 MG tablet Take 2 tablets by mouth 2 times daily as needed for constipation (Patient not taking: Reported on 4/9/2020) 60 each 1     sulfamethoxazole-trimethoprim (BACTRIM DS) 800-160 MG tablet Take 1 tablet by mouth Every Mon, Tues two times daily 16 tablet 11     Vitamin D, Cholecalciferol, 25 MCG (1000 UT) TABS Take 2 tablets (2,000 Units) by mouth daily 60 tablet 3     Lazaro reports he is taking bactrim and protonix regularly.      Received influenza vaccine for the 4316-6635 season.      Physical Exam:   Temp:  [98.5  F (36.9  C)] 98.5  F (36.9  C)  Pulse:  [95] 95  Resp:  [18] 18  BP: (123)/(71) 123/71  SpO2:  [98 %] 98 %     Wt Readings from Last 4 Encounters:   04/09/20 86.1 kg (189 lb 13.1 oz)   04/02/20 87.7 kg (193 lb 5.5 oz)   03/26/20 85.3 kg (188 lb 0.8 oz)   03/26/20 85.3 kg (188 lb 0.8 oz)     Ht Readings from Last 2 Encounters:   04/09/20 1.829 m (6' 0.01\")   04/02/20 1.84 m (6' 0.44\")     Lazaro is now above his baseline weight of 180 lbs.  General: Lazaro is alert, interactive and appropriate; bright affect today and very talkative  HEENT: Skull is atrauamatic and normocephalic.  Full head of hair. PERRLA, sclera are non icteric and not injected, EOM are intact, gaze slightly disconjugate which is his baseline. Nares are patent without drainage. Oropharynx clear, no plaques noted. Buccal mucosa and tongue clear. MMM.  Neck:  Supple without lymphadenopathy.   Lymph: There is no cervical, supraclavicular, axillary, lymphadenopathy palpated.  Cardiovascular:  HR is regular, S1, S2 no murmur.  Capillary refill is < 2 seconds. Right arm without edema or erythema.  No pitting edema.  Cap refill < 2 sec. Peripheral pulses 2+/=. He has mildly distended veins " noted on the left upper chest, not extending up to the neck, overlying the pectoralis.   Respiratory: No cough noted. Respirations are easy.  Lungs are clear to auscultation throughout.  No crackles or wheezes.  Gastrointestinal:  BS present in all quadrants.  Abdomen is soft and flat.  Lazaro denies LUQ discomfort, no pain with palpation. No hepatosplenomegaly or masses are palpated.  Skin: No skin lesions noted.  Neurological:  CN 2-12 grossly intact. Gait is normal.  No issues with balance. Sensation intact in hands and feet.     Musculoskeletal:  Good strength and ROM in all extremities.  Strong dorsiflexion at ankles and great toes (5/5) bilaterally without any pain at the Achilles.    Labs:   Results for orders placed or performed in visit on 04/09/20   CBC with platelets     Status: Abnormal   Result Value Ref Range    WBC 1.5 (L) 4.0 - 11.0 10e9/L    RBC Count 3.37 (L) 4.4 - 5.9 10e12/L    Hemoglobin 10.1 (L) 13.3 - 17.7 g/dL    Hematocrit 28.6 (L) 40.0 - 53.0 %    MCV 85 78 - 100 fl    MCH 30.0 26.5 - 33.0 pg    MCHC 35.3 31.5 - 36.5 g/dL    RDW 11.8 10.0 - 15.0 %    Platelet Count 71 (L) 150 - 450 10e9/L   WBC Differential     Status: Abnormal   Result Value Ref Range    Diff Method Automated Method     % Neutrophils 27.4 %    % Lymphocytes 61.3 %    % Monocytes 8.5 %    % Eosinophils 2.8 %    % Basophils 0.0 %    % Immature Granulocytes 0.0 %    Nucleated RBCs 0 0 /100    Absolute Neutrophil 0.4 (LL) 1.6 - 8.3 10e9/L    Absolute Lymphocytes 0.9 0.8 - 5.3 10e9/L    Absolute Monocytes 0.1 0.0 - 1.3 10e9/L    Absolute Eosinophils 0.0 0.0 - 0.7 10e9/L    Absolute Basophils 0.0 0.0 - 0.2 10e9/L    Abs Immature Granulocytes 0.0 0 - 0.4 10e9/L    Absolute Nucleated RBC 0.0     Poikilocytosis Slight     Target Cells Moderate     Microcytes Present     Platelet Estimate Confirming automated cell count    ABO/Rh type and screen     Status: None   Result Value Ref Range    ABO O     RH(D) Pos     Antibody Screen Neg      Test Valid Only At          [facility]    Specimen Expires 04/12/2020    Platelets prepare order unit     Status: None   Result Value Ref Range    Blood Component Type PLT Pheresis     Units Ordered 1        Assessment:  Lazaro Lund is a 19 year old young man with T cell lymphoblastic lymphoma (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  He is being treated per COG protocol [id number].   He's had a CRu following Induction with a CR at the end of Consolidation. His course has been complicated by extensive bilateral DVT and severe necrotizing pancreatitis.  He completed treatment with lovenox. Recent abdominal CT shows continued improvement in regards to the sequelae from his pancreatitis.  Asparaginase will be permanently discontinued from his treatment regimen. He is on Vit D for deficiency.  He comes to clinic today for Day 50 of Delayed Intensification.  He is doing well overall.  Blood counts as expected.  He is doing well with smoking cessation.      Plan:   1) Reviewed labs with Lazaro, no transfusions needed.  2) Day 50 vincristine given in clinic  3) Continue to monitor for new symptoms of thrombosis while off of lovenox. Low threshold to repeat U/S if any signs of thrombus.   4) Lazaro will have a repeat CT at the end of April per Dr Coronel. His last note indicates he would like to follow him with imaging until he has complete resolution.    5) Continue Vit D 3 2000 IU daily (last level was 19 on 3/5/20).  Recheck level in August.  6) Day 29 Induction LP deferred, plan to make-up dose in 5th cycle of Maintenance therapy.  7) TPMT and NUDT15 genotype is normal, plan for regular dose thiopurines.  8) Given significant spinal headache history will plan for IV caffeine following LPs in the future.   9) Lazaro is doing well with smoking cessation, continue to follow.  10) Reviewed Maintenance calendar and medications with Lazaro in detail.  Also discussed our target ANC and the  importance of taking his chemotherapy medications regularly without missed doses.   11) RTC in one week to start Maintenance therapy pending counts.      YOHAN Dunn CNP

## 2020-04-09 NOTE — PROGRESS NOTES
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 Lazaro presented with acute onset cough and was found to have an anterior mediastinal mass and malignant left-sided pleural effusion.  A CT guided biopsy was obtained at [facility] and pathology was consistent with T-cell leukemia vs lymphoma.  He was admitted to [facility] oncology service 8/30 and started on treatment per [id number].  He had a pleural effusion that required a chest tube and a pericardial effusion that was not drained. His Induction was complicated by cardiac compression secondary to his mass leading to tamponade, this improved through out his hospital stay.  He also had dysphagia that improved with treatment of his mass, swallow study was normal. His course was recently complicated by extensive bilateral UE DVTs, he remains on anticoagulation.  Most recently his course was complicated by the development of severe asparaginase induced pancreatitis. He became quite ill with SIRS and associated hypotension, he required pressor support in the ICU.  Fortunately Lazaro recovered well and his subsequent imaging has continued to show improvement.  He comes to clinic today for Day 50 of Delayed Intensification.    HPI:   Lazaro comes to clinic today by himself.  He has been doing really well since his last visit.  He does not have any complaints today.  Energy level is good, if anything he is bored.  He is eating well, no GI upset, no abdominal pain.  Passing soft stool without need for laxatives.  Denies skin concerns or pain.  No fever.  No paresthesias.  Sleeping well at night, mood is good.    Lazaro has done well with smoking cessation.  He is no longer using patches.  His mom is living elsewhere to avoid exposure to his grandma and he feels this has been helpful since his mom is a smoker and being around it would tempt him.    Review of systems:  Pertinent positives reported in the  HPI. All other systems in a complete and comprehensive review of systems were negative..    PMH:   Past Medical History:   Diagnosis Date     Acute necrotizing pancreatitis 11/07/2019    attributed to asparaginase     Acute pancreatitis due to PEGaspariginase therapy  11/15/2019     DVT of upper extremity (deep vein thrombosis) (H) 09/26/2019    Bilateral      Edema of upper extremity 10/17/2019     Folliculitis 10/17/2019     Migraine 2006    have resolved     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   -- Spinal HA following LP  -- TPMT shows Intermediate activity with normal TPMT/NUDT15 genotype  -- Factor V leiden and prothrombin gene mutation negative  -- Necrotizing pancreatitis secondary to asparaginase    PFMH:   Family History   Problem Relation Age of Onset     No Known Problems Mother      No Known Problems Father      Asthma Brother      Thyroid Cancer Paternal Grandmother      Melanoma Paternal Aunt      Social History: Lazaro previously lived at home with his dad and step mom in [place] but is now staying with his grandma in [place].  He has stopped working since [employer] restriction went into place.    Current Medications:  Current Outpatient Medications   Medication Sig Dispense Refill     cytarabine, PF, (CYTOSAR) 100 MG/ML injection Inject 1.5 mLs (150 mg) Subcutaneous daily for 3 days Start day after clinic dose. 4.5 mL 0     cytarabine, PF, (CYTOSAR) 100 MG/ML injection Inject 1.5 mLs (150 mg) Subcutaneous daily for 3 days Start day after clinic dose. 4.5 mL 0     diphenhydrAMINE (BENADRYL) 25 MG capsule Take 1-2 capsules (25-50 mg) by mouth every 6 hours as needed (Breakthrough Nausea and Vomiting ) 30 capsule 1     ondansetron (ZOFRAN-ODT) 8 MG ODT tab Take 1 tablet (8 mg) by mouth every 8 hours as needed for nausea 20 tablet 6     pantoprazole 40 MG PO EC tablet Take 1 tablet (40 mg) by mouth every morning (before breakfast) 30 tablet 2     polyethylene glycol (MIRALAX/GLYCOLAX) packet Take  "17 g by mouth daily 30 packet 0     scopolamine (TRANSDERM) 1 MG/3DAYS 72 hr patch Place 1 patch onto the skin every 72 hours 10 patch 3     sennosides (SENOKOT) 8.6 MG tablet Take 2 tablets by mouth 2 times daily as needed for constipation (Patient not taking: Reported on 4/9/2020) 60 each 1     sulfamethoxazole-trimethoprim (BACTRIM DS) 800-160 MG tablet Take 1 tablet by mouth Every Mon, Tues two times daily 16 tablet 11     Vitamin D, Cholecalciferol, 25 MCG (1000 UT) TABS Take 2 tablets (2,000 Units) by mouth daily 60 tablet 3     Lazaro reports he is taking bactrim and protonix regularly.      Received influenza vaccine for the 9914-6653 season.      Physical Exam:   Temp:  [98.5  F (36.9  C)] 98.5  F (36.9  C)  Pulse:  [95] 95  Resp:  [18] 18  BP: (123)/(71) 123/71  SpO2:  [98 %] 98 %     Wt Readings from Last 4 Encounters:   04/09/20 86.1 kg (189 lb 13.1 oz)   04/02/20 87.7 kg (193 lb 5.5 oz)   03/26/20 85.3 kg (188 lb 0.8 oz)   03/26/20 85.3 kg (188 lb 0.8 oz)     Ht Readings from Last 2 Encounters:   04/09/20 1.829 m (6' 0.01\")   04/02/20 1.84 m (6' 0.44\")     Lazaro is now above his baseline weight of 180 lbs.  General: Lazaro is alert, interactive and appropriate; bright affect today and very talkative  HEENT: Skull is atrauamatic and normocephalic.  Full head of hair. PERRLA, sclera are non icteric and not injected, EOM are intact, gaze slightly disconjugate which is his baseline. Nares are patent without drainage. Oropharynx clear, no plaques noted. Buccal mucosa and tongue clear. MMM.  Neck:  Supple without lymphadenopathy.   Lymph: There is no cervical, supraclavicular, axillary, lymphadenopathy palpated.  Cardiovascular:  HR is regular, S1, S2 no murmur.  Capillary refill is < 2 seconds. Right arm without edema or erythema.  No pitting edema.  Cap refill < 2 sec. Peripheral pulses 2+/=. He has mildly distended veins noted on the left upper chest, not extending up to the neck, overlying the " pectoralis.   Respiratory: No cough noted. Respirations are easy.  Lungs are clear to auscultation throughout.  No crackles or wheezes.  Gastrointestinal:  BS present in all quadrants.  Abdomen is soft and flat.  Lazaro denies LUQ discomfort, no pain with palpation. No hepatosplenomegaly or masses are palpated.  Skin: No skin lesions noted.  Neurological:  CN 2-12 grossly intact. Gait is normal.  No issues with balance. Sensation intact in hands and feet.     Musculoskeletal:  Good strength and ROM in all extremities.  Strong dorsiflexion at ankles and great toes (5/5) bilaterally without any pain at the Achilles.    Labs:   Results for orders placed or performed in visit on 04/09/20   CBC with platelets     Status: Abnormal   Result Value Ref Range    WBC 1.5 (L) 4.0 - 11.0 10e9/L    RBC Count 3.37 (L) 4.4 - 5.9 10e12/L    Hemoglobin 10.1 (L) 13.3 - 17.7 g/dL    Hematocrit 28.6 (L) 40.0 - 53.0 %    MCV 85 78 - 100 fl    MCH 30.0 26.5 - 33.0 pg    MCHC 35.3 31.5 - 36.5 g/dL    RDW 11.8 10.0 - 15.0 %    Platelet Count 71 (L) 150 - 450 10e9/L   WBC Differential     Status: Abnormal   Result Value Ref Range    Diff Method Automated Method     % Neutrophils 27.4 %    % Lymphocytes 61.3 %    % Monocytes 8.5 %    % Eosinophils 2.8 %    % Basophils 0.0 %    % Immature Granulocytes 0.0 %    Nucleated RBCs 0 0 /100    Absolute Neutrophil 0.4 (LL) 1.6 - 8.3 10e9/L    Absolute Lymphocytes 0.9 0.8 - 5.3 10e9/L    Absolute Monocytes 0.1 0.0 - 1.3 10e9/L    Absolute Eosinophils 0.0 0.0 - 0.7 10e9/L    Absolute Basophils 0.0 0.0 - 0.2 10e9/L    Abs Immature Granulocytes 0.0 0 - 0.4 10e9/L    Absolute Nucleated RBC 0.0     Poikilocytosis Slight     Target Cells Moderate     Microcytes Present     Platelet Estimate Confirming automated cell count    ABO/Rh type and screen     Status: None   Result Value Ref Range    ABO O     RH(D) Pos     Antibody Screen Neg     Test Valid Only At          [facility]  [place]    Specimen Expires 04/12/2020    Platelets prepare order unit     Status: None   Result Value Ref Range    Blood Component Type PLT Pheresis     Units Ordered 1        Assessment:  Lazaro Lund is a 19 year old young man with T cell lymphoblastic lymphoma (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  He is being treated per COG protocol [id number].   He's had a CRu following Induction with a CR at the end of Consolidation. His course has been complicated by extensive bilateral DVT and severe necrotizing pancreatitis.  He completed treatment with lovenox. Recent abdominal CT shows continued improvement in regards to the sequelae from his pancreatitis.  Asparaginase will be permanently discontinued from his treatment regimen. He is on Vit D for deficiency.  He comes to clinic today for Day 50 of Delayed Intensification.  He is doing well overall.  Blood counts as expected.  He is doing well with smoking cessation.      Plan:   1) Reviewed labs with Lazaro, no transfusions needed.  2) Day 50 vincristine given in clinic  3) Continue to monitor for new symptoms of thrombosis while off of lovenox. Low threshold to repeat U/S if any signs of thrombus.   4) Lazaro will have a repeat CT at the end of April per Dr Coronel. His last note indicates he would like to follow him with imaging until he has complete resolution.    5) Continue Vit D 3 2000 IU daily (last level was 19 on 3/5/20).  Recheck level in August.  6) Day 29 Induction LP deferred, plan to make-up dose in 5th cycle of Maintenance therapy.  7) TPMT and NUDT15 genotype is normal, plan for regular dose thiopurines.  8) Given significant spinal headache history will plan for IV caffeine following LPs in the future.   9) Lazaro is doing well with smoking cessation, continue to follow.  10) Reviewed Maintenance calendar and medications with Lazaro in detail.  Also discussed our target ANC and the importance of taking his chemotherapy medications regularly  without missed doses.   11) RTC in one week to start Maintenance therapy pending counts.

## 2020-04-09 NOTE — PROGRESS NOTES
Infusion Nursing Note    Lazaro Lund Presents to [facility] today for: Vincristine and possible transfusion    Due to : T lymphoblastic lymphoma (H)    Intravenous Access/Labs: Single port accessed using sterile technique without issue.  Positive blood return noted.     Coping:   [facility] declined for port access as pt is adult.     Infusion Note: Following port access, labs were drawn.  Vincristine given by gravity without issue.  Positive blood return noted pre, mid, and post-infusion.  Pt did not need tranfusions today, as hgb was 10.1 and platelets were 72.  Port was flushed, heparin locked, and de-accessed.      Discharge Plan:   Patient verbalized understanding of discharge instructions.  Pt left [facility] in stable condition at conclusion of appt.

## 2020-04-09 NOTE — NURSING NOTE
"Chief Complaint   Patient presents with     Infusion     Vincristine and possible transfusion       /71 (BP Location: Left arm, Patient Position: Sitting, Cuff Size: Adult Large)   Pulse 95   Temp 98.5  F (36.9  C) (Oral)   Resp 18   Ht 1.829 m (6' 0.01\")   Wt 86.1 kg (189 lb 13.1 oz)   SpO2 98%   BMI 25.74 kg/m      Efrem Montejo LPN  April 9, 2020  "

## 2020-04-13 RX ORDER — CAFFEINE AND SODIUM BENZOATE 125 MG/ML
500 INJECTION, SOLUTION INTRAMUSCULAR; INTRAVENOUS ONCE
Status: CANCELLED
Start: 2020-05-14

## 2020-04-13 RX ORDER — EPINEPHRINE 1 MG/ML
0.3 INJECTION, SOLUTION, CONCENTRATE INTRAVENOUS EVERY 5 MIN PRN
Status: CANCELLED | OUTPATIENT
Start: 2020-05-14

## 2020-04-13 RX ORDER — METHYLPREDNISOLONE SODIUM SUCCINATE 125 MG/2ML
125 INJECTION, POWDER, LYOPHILIZED, FOR SOLUTION INTRAMUSCULAR; INTRAVENOUS
Status: CANCELLED | OUTPATIENT
Start: 2020-06-09

## 2020-04-13 RX ORDER — ALBUTEROL SULFATE 90 UG/1
1-2 AEROSOL, METERED RESPIRATORY (INHALATION)
Status: CANCELLED
Start: 2020-04-23

## 2020-04-13 RX ORDER — ALBUTEROL SULFATE 0.83 MG/ML
2.5 SOLUTION RESPIRATORY (INHALATION)
Status: CANCELLED | OUTPATIENT
Start: 2020-05-14

## 2020-04-13 RX ORDER — DIPHENHYDRAMINE HYDROCHLORIDE 50 MG/ML
50 INJECTION INTRAMUSCULAR; INTRAVENOUS
Status: CANCELLED
Start: 2020-06-09

## 2020-04-13 RX ORDER — METHYLPREDNISOLONE SODIUM SUCCINATE 125 MG/2ML
125 INJECTION, POWDER, LYOPHILIZED, FOR SOLUTION INTRAMUSCULAR; INTRAVENOUS
Status: CANCELLED | OUTPATIENT
Start: 2020-04-23

## 2020-04-13 RX ORDER — ALBUTEROL SULFATE 90 UG/1
1-2 AEROSOL, METERED RESPIRATORY (INHALATION)
Status: CANCELLED
Start: 2020-05-14

## 2020-04-13 RX ORDER — ALBUTEROL SULFATE 90 UG/1
1-2 AEROSOL, METERED RESPIRATORY (INHALATION)
Status: CANCELLED
Start: 2020-06-09

## 2020-04-13 RX ORDER — ALBUTEROL SULFATE 0.83 MG/ML
2.5 SOLUTION RESPIRATORY (INHALATION)
Status: CANCELLED | OUTPATIENT
Start: 2020-06-09

## 2020-04-13 RX ORDER — EPINEPHRINE 1 MG/ML
0.3 INJECTION, SOLUTION, CONCENTRATE INTRAVENOUS EVERY 5 MIN PRN
Status: CANCELLED | OUTPATIENT
Start: 2020-04-23

## 2020-04-13 RX ORDER — SODIUM CHLORIDE 9 MG/ML
200 INJECTION, SOLUTION INTRAVENOUS CONTINUOUS PRN
Status: CANCELLED | OUTPATIENT
Start: 2020-05-14

## 2020-04-13 RX ORDER — EPINEPHRINE 1 MG/ML
0.3 INJECTION, SOLUTION, CONCENTRATE INTRAVENOUS EVERY 5 MIN PRN
Status: CANCELLED | OUTPATIENT
Start: 2020-06-09

## 2020-04-13 RX ORDER — ALBUTEROL SULFATE 0.83 MG/ML
2.5 SOLUTION RESPIRATORY (INHALATION)
Status: CANCELLED | OUTPATIENT
Start: 2020-04-23

## 2020-04-13 RX ORDER — DIPHENHYDRAMINE HYDROCHLORIDE 50 MG/ML
50 INJECTION INTRAMUSCULAR; INTRAVENOUS
Status: CANCELLED
Start: 2020-05-14

## 2020-04-13 RX ORDER — METHYLPREDNISOLONE SODIUM SUCCINATE 125 MG/2ML
125 INJECTION, POWDER, LYOPHILIZED, FOR SOLUTION INTRAMUSCULAR; INTRAVENOUS
Status: CANCELLED | OUTPATIENT
Start: 2020-05-14

## 2020-04-13 RX ORDER — CAFFEINE AND SODIUM BENZOATE 125 MG/ML
500 INJECTION, SOLUTION INTRAMUSCULAR; INTRAVENOUS ONCE
Status: CANCELLED
Start: 2020-04-23

## 2020-04-13 RX ORDER — SODIUM CHLORIDE 9 MG/ML
200 INJECTION, SOLUTION INTRAVENOUS CONTINUOUS PRN
Status: CANCELLED | OUTPATIENT
Start: 2020-06-09

## 2020-04-13 RX ORDER — DIPHENHYDRAMINE HYDROCHLORIDE 50 MG/ML
50 INJECTION INTRAMUSCULAR; INTRAVENOUS
Status: CANCELLED
Start: 2020-04-23

## 2020-04-13 RX ORDER — SODIUM CHLORIDE 9 MG/ML
200 INJECTION, SOLUTION INTRAVENOUS CONTINUOUS PRN
Status: CANCELLED | OUTPATIENT
Start: 2020-04-23

## 2020-04-15 ENCOUNTER — TELEPHONE (OUTPATIENT)
Dept: PEDIATRIC HEMATOLOGY/ONCOLOGY | Facility: CLINIC | Age: 20
End: 2020-04-15

## 2020-04-15 ENCOUNTER — HOME INFUSION (PRE-WILLOW HOME INFUSION) (OUTPATIENT)
Dept: PHARMACY | Facility: CLINIC | Age: 20
End: 2020-04-15

## 2020-04-15 NOTE — TELEPHONE ENCOUNTER
Left a voicemail for Lazaro letting him know that he needs to complete a wellness screening before his appointment. Left the number for the  at [facility].     Josie Hernández, MARTHA  April 15, 2020

## 2020-04-16 ENCOUNTER — HOSPITAL ENCOUNTER (OUTPATIENT)
Facility: CLINIC | Age: 20
Discharge: HOME | End: 2020-04-16
Attending: PEDIATRICS | Admitting: PEDIATRICS
Payer: COMMERCIAL

## 2020-04-16 ENCOUNTER — OFFICE VISIT (OUTPATIENT)
Dept: PEDIATRIC HEMATOLOGY/ONCOLOGY | Facility: CLINIC | Age: 20
End: 2020-04-16
Attending: NURSE PRACTITIONER
Payer: COMMERCIAL

## 2020-04-16 ENCOUNTER — INFUSION THERAPY VISIT (OUTPATIENT)
Dept: INFUSION THERAPY | Facility: CLINIC | Age: 20
End: 2020-04-16
Attending: NURSE PRACTITIONER
Payer: COMMERCIAL

## 2020-04-16 VITALS
WEIGHT: 190.92 LBS | HEART RATE: 95 BPM | OXYGEN SATURATION: 100 % | DIASTOLIC BLOOD PRESSURE: 62 MMHG | TEMPERATURE: 98.4 F | BODY MASS INDEX: 25.86 KG/M2 | HEIGHT: 72 IN | SYSTOLIC BLOOD PRESSURE: 123 MMHG | RESPIRATION RATE: 16 BRPM

## 2020-04-16 DIAGNOSIS — Z53.9 ERRONEOUS ENCOUNTER--DISREGARD: Primary | ICD-10-CM

## 2020-04-16 DIAGNOSIS — C83.50 T LYMPHOBLASTIC LYMPHOMA (H): Primary | ICD-10-CM

## 2020-04-16 LAB
ALBUMIN SERPL-MCNC: 3.7 G/DL (ref 3.4–5)
ALP SERPL-CCNC: 68 U/L (ref 40–150)
ALT SERPL W P-5'-P-CCNC: 22 U/L (ref 0–70)
ANION GAP SERPL CALCULATED.3IONS-SCNC: 4 MMOL/L (ref 3–14)
AST SERPL W P-5'-P-CCNC: 11 U/L (ref 0–45)
BASOPHILS # BLD AUTO: 0 10E9/L (ref 0–0.2)
BASOPHILS NFR BLD AUTO: 0 %
BILIRUB SERPL-MCNC: 0.2 MG/DL (ref 0.2–1.3)
BUN SERPL-MCNC: 14 MG/DL (ref 7–30)
CALCIUM SERPL-MCNC: 8.2 MG/DL (ref 8.5–10.1)
CHLORIDE SERPL-SCNC: 112 MMOL/L (ref 94–109)
CO2 SERPL-SCNC: 27 MMOL/L (ref 20–32)
CREAT SERPL-MCNC: 0.64 MG/DL (ref 0.66–1.25)
DIFFERENTIAL METHOD BLD: ABNORMAL
EOSINOPHIL # BLD AUTO: 0 10E9/L (ref 0–0.7)
EOSINOPHIL NFR BLD AUTO: 0.9 %
ERYTHROCYTE [DISTWIDTH] IN BLOOD BY AUTOMATED COUNT: 12 % (ref 10–15)
GFR SERPL CREATININE-BSD FRML MDRD: >90 ML/MIN/{1.73_M2}
GLUCOSE SERPL-MCNC: 98 MG/DL (ref 70–99)
HCT VFR BLD AUTO: 26.4 % (ref 40–53)
HGB BLD-MCNC: 9.4 G/DL (ref 13.3–17.7)
LYMPHOCYTES # BLD AUTO: 0.8 10E9/L (ref 0.8–5.3)
LYMPHOCYTES NFR BLD AUTO: 47.2 %
MCH RBC QN AUTO: 30.3 PG (ref 26.5–33)
MCHC RBC AUTO-ENTMCNC: 35.6 G/DL (ref 31.5–36.5)
MCV RBC AUTO: 85 FL (ref 78–100)
MONOCYTES # BLD AUTO: 0.3 10E9/L (ref 0–1.3)
MONOCYTES NFR BLD AUTO: 17.9 %
MYELOCYTES # BLD: 0 10E9/L
MYELOCYTES NFR BLD MANUAL: 2.7 %
NEUTROPHILS # BLD AUTO: 0.5 10E9/L (ref 1.6–8.3)
NEUTROPHILS NFR BLD AUTO: 31.3 %
PLATELET # BLD AUTO: 273 10E9/L (ref 150–450)
PLATELET # BLD EST: ABNORMAL 10*3/UL
POLYCHROMASIA BLD QL SMEAR: SLIGHT
POTASSIUM SERPL-SCNC: 3.6 MMOL/L (ref 3.4–5.3)
PROT SERPL-MCNC: 6.2 G/DL (ref 6.8–8.8)
RBC # BLD AUTO: 3.1 10E12/L (ref 4.4–5.9)
SODIUM SERPL-SCNC: 144 MMOL/L (ref 133–144)
WBC # BLD AUTO: 1.6 10E9/L (ref 4–11)

## 2020-04-16 PROCEDURE — 40001011 ZZH STATISTIC PRE-PROCEDURE NURSING ASSESSMENT: Performed by: PEDIATRICS

## 2020-04-16 PROCEDURE — 40000114 ZZH STATISTIC NO CHARGE CLINIC VISIT

## 2020-04-16 PROCEDURE — 85025 COMPLETE CBC W/AUTO DIFF WBC: CPT | Performed by: PEDIATRICS

## 2020-04-16 PROCEDURE — 25000128 H RX IP 250 OP 636: Performed by: NURSE PRACTITIONER

## 2020-04-16 PROCEDURE — 80053 COMPREHEN METABOLIC PANEL: CPT | Performed by: PEDIATRICS

## 2020-04-16 PROCEDURE — 36591 DRAW BLOOD OFF VENOUS DEVICE: CPT | Performed by: PEDIATRICS

## 2020-04-16 RX ORDER — HEPARIN SODIUM,PORCINE 10 UNIT/ML
5 VIAL (ML) INTRAVENOUS ONCE
Status: DISCONTINUED | OUTPATIENT
Start: 2020-04-16 | End: 2020-04-16 | Stop reason: HOSPADM

## 2020-04-16 RX ORDER — LIDOCAINE 40 MG/G
2.5 CREAM TOPICAL
Status: DISCONTINUED | OUTPATIENT
Start: 2020-04-16 | End: 2020-04-16 | Stop reason: HOSPADM

## 2020-04-16 RX ADMIN — HEPARIN 500 UNITS: 100 SYRINGE at 08:27

## 2020-04-16 ASSESSMENT — MIFFLIN-ST. JEOR: SCORE: 1916

## 2020-04-16 NOTE — PROGRESS NOTES
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 Lazaro presented with acute onset cough and was found to have an anterior mediastinal mass and malignant left-sided pleural effusion.  A CT guided biopsy was obtained at [facility] and pathology was consistent with T-cell leukemia vs lymphoma.  He was admitted to [facility] oncology service 8/30 and started on treatment per [id number].  He had a pleural effusion that required a chest tube and a pericardial effusion that was not drained. His Induction was complicated by cardiac compression secondary to his mass leading to tamponade, this improved through out his hospital stay.  He also had dysphagia that improved with treatment of his mass, swallow study was normal. His course was recently complicated by extensive bilateral UE DVTs, he remains on anticoagulation.  Most recently his course was complicated by the development of severe asparaginase induced pancreatitis. He became quite ill with SIRS and associated hypotension, he required pressor support in the ICU.  Fortunately Lazaro recovered well and his subsequent imaging has continued to show improvement.  He comes to clinic today to start Maintenance therapy.    HPI:   Lazaro comes to clinic today by himself.  He has been doing well since his last visit. He is eating well, no GI upset or abdominal pain.  He has been trying to get out for more walks.  Energy level is good. He had some loose stool at the end of last week but this has since returned to normal.  Denies skin concerns or pain.  No fever.  No paresthesias.  Sleeping well at night, mood is good.  Lazaro reports he felt something on his left testicle while in the shower a few days ago, he is wondering if this is something to be concerned about.    Lazaro has done well with smoking cessation.  He is no longer using patches.  His mom is living elsewhere to avoid exposure to his grandma and  he feels this has been helpful since his mom is a smoker and being around it would tempt him.    Review of systems:  Pertinent positives reported in the HPI. All other systems in a complete and comprehensive review of systems were negative..    PMH:   Past Medical History:   Diagnosis Date     Acute necrotizing pancreatitis 11/07/2019    attributed to asparaginase     Acute pancreatitis due to PEGaspariginase therapy  11/15/2019     DVT of upper extremity (deep vein thrombosis) (H) 09/26/2019    Bilateral      Edema of upper extremity 10/17/2019     Folliculitis 10/17/2019     Migraine 2006    have resolved     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   -- Spinal HA following LP  -- TPMT shows Intermediate activity with normal TPMT/NUDT15 genotype  -- Factor V leiden and prothrombin gene mutation negative  -- Necrotizing pancreatitis secondary to asparaginase    PFMH:   Family History   Problem Relation Age of Onset     No Known Problems Mother      No Known Problems Father      Asthma Brother      Thyroid Cancer Paternal Grandmother      Melanoma Paternal Aunt      Social History: Lazaro previously lived at home with his dad and step mom in [place] but is now staying with his grandma in [place].  He has stopped working since [employer] restriction went into place.    Current Medications:  Current Outpatient Medications   Medication Sig Dispense Refill     diphenhydrAMINE (BENADRYL) 25 MG capsule Take 1-2 capsules (25-50 mg) by mouth every 6 hours as needed (Breakthrough Nausea and Vomiting ) 30 capsule 1     lidocaine-prilocaine (EMLA) 2.5-2.5 % external cream Apply topically as needed for other (prior to port access) 30 g 6     ondansetron (ZOFRAN-ODT) 8 MG ODT tab Take 1 tablet (8 mg) by mouth every 8 hours as needed for nausea 20 tablet 6     pantoprazole 40 MG PO EC tablet Take 1 tablet (40 mg) by mouth every morning (before breakfast) 30 tablet 2     polyethylene glycol (MIRALAX/GLYCOLAX) packet Take 17 g by  "mouth daily 30 packet 0     scopolamine (TRANSDERM) 1 MG/3DAYS 72 hr patch Place 1 patch onto the skin every 72 hours 10 patch 3     sennosides (SENOKOT) 8.6 MG tablet Take 2 tablets by mouth 2 times daily as needed for constipation (Patient not taking: Reported on 4/9/2020) 60 each 1     sulfamethoxazole-trimethoprim (BACTRIM DS) 800-160 MG tablet Take 1 tablet by mouth Every Mon, Tues two times daily 16 tablet 11     Vitamin D, Cholecalciferol, 25 MCG (1000 UT) TABS Take 2 tablets (2,000 Units) by mouth daily 60 tablet 3     Lazaro reports he is taking bactrim and protonix regularly.      Received influenza vaccine for the 0341-1677 season.      Physical Exam:   Temp:  [98.4  F (36.9  C)] 98.4  F (36.9  C)  Pulse:  [95] 95  Resp:  [16] 16  BP: (123)/(62) 123/62  SpO2:  [100 %] 100 %     Wt Readings from Last 4 Encounters:   04/16/20 86.6 kg (190 lb 14.7 oz)   04/09/20 86.1 kg (189 lb 13.1 oz)   04/02/20 87.7 kg (193 lb 5.5 oz)   03/26/20 85.3 kg (188 lb 0.8 oz)     Ht Readings from Last 2 Encounters:   04/16/20 1.84 m (6' 0.44\")   04/09/20 1.829 m (6' 0.01\")     Lazaro is now above his baseline weight of 180 lbs.  General: Lazaro is alert, interactive and appropriate; bright affect today and very talkative  HEENT: Skull is atrauamatic and normocephalic.  Full head of hair. PERRLA, sclera are non icteric and not injected, EOM are intact, gaze slightly disconjugate which is his baseline. Nares are patent without drainage. Oropharynx clear, no plaques noted. Buccal mucosa and tongue clear. MMM.  Neck:  Supple without lymphadenopathy.   Lymph: There is no cervical, supraclavicular, axillary, lymphadenopathy palpated.  Cardiovascular:  HR is regular, S1, S2 no murmur.  Capillary refill is < 2 seconds. Right arm without edema or erythema.  No pitting edema.  Cap refill < 2 sec. Peripheral pulses 2+/=. He has mildly distended veins noted on the left upper chest, not extending up to the neck, overlying the pectoralis. "   Respiratory: No cough noted. Respirations are easy.  Lungs are clear to auscultation throughout.  No crackles or wheezes.  Gastrointestinal:  BS present in all quadrants.  Abdomen is soft and flat.  Lazaro denies LUQ discomfort, no pain with palpation. No hepatosplenomegaly or masses are palpated.  : Testes descended bilaterally, I do not appreciate a mass or lump on the left testis, Lazaro is also unable to find it.  Skin: No skin lesions noted.  Neurological:  CN 2-12 grossly intact. Gait is normal.  No issues with balance. Sensation intact in hands and feet.     Musculoskeletal:  Good strength and ROM in all extremities.  Strong dorsiflexion at ankles and great toes (5/5) bilaterally without any pain at the Achilles.    Labs:   Results for orders placed or performed during the hospital encounter of 04/16/20   CBC with platelets differential     Status: Abnormal   Result Value Ref Range    WBC 1.6 (L) 4.0 - 11.0 10e9/L    RBC Count 3.10 (L) 4.4 - 5.9 10e12/L    Hemoglobin 9.4 (L) 13.3 - 17.7 g/dL    Hematocrit 26.4 (L) 40.0 - 53.0 %    MCV 85 78 - 100 fl    MCH 30.3 26.5 - 33.0 pg    MCHC 35.6 31.5 - 36.5 g/dL    RDW 12.0 10.0 - 15.0 %    Platelet Count 273 150 - 450 10e9/L    Diff Method Manual Differential     % Neutrophils 31.3 %    % Lymphocytes 47.2 %    % Monocytes 17.9 %    % Eosinophils 0.9 %    % Basophils 0.0 %    % Myelocytes 2.7 %    Absolute Neutrophil 0.5 (L) 1.6 - 8.3 10e9/L    Absolute Lymphocytes 0.8 0.8 - 5.3 10e9/L    Absolute Monocytes 0.3 0.0 - 1.3 10e9/L    Absolute Eosinophils 0.0 0.0 - 0.7 10e9/L    Absolute Basophils 0.0 0.0 - 0.2 10e9/L    Absolute Myelocytes 0.0 0 10e9/L    Polychromasia Slight     Platelet Estimate Confirming automated cell count    Comprehensive metabolic panel     Status: Abnormal   Result Value Ref Range    Sodium 144 133 - 144 mmol/L    Potassium 3.6 3.4 - 5.3 mmol/L    Chloride 112 (H) 94 - 109 mmol/L    Carbon Dioxide 27 20 - 32 mmol/L    Anion Gap 4 3 - 14  mmol/L    Glucose 98 70 - 99 mg/dL    Urea Nitrogen 14 7 - 30 mg/dL    Creatinine 0.64 (L) 0.66 - 1.25 mg/dL    GFR Estimate >90 >60 mL/min/[1.73_m2]    GFR Estimate If Black >90 >60 mL/min/[1.73_m2]    Calcium 8.2 (L) 8.5 - 10.1 mg/dL    Bilirubin Total 0.2 0.2 - 1.3 mg/dL    Albumin 3.7 3.4 - 5.0 g/dL    Protein Total 6.2 (L) 6.8 - 8.8 g/dL    Alkaline Phosphatase 68 40 - 150 U/L    ALT 22 0 - 70 U/L    AST 11 0 - 45 U/L       Assessment:  Lazaro Lund is a 19 year old young man with T cell lymphoblastic lymphoma (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  He is being treated per COG protocol [id number].   He's had a CRu following Induction with a CR at the end of Consolidation. His course has been complicated by extensive bilateral DVT and severe necrotizing pancreatitis.  He completed treatment with lovenox. Recent abdominal CT shows continued improvement in regards to the sequelae from his pancreatitis.  Asparaginase will be permanently discontinued from his treatment regimen. He is on Vit D for deficiency.  He comes to clinic today to start Maintenance therapy.  He is doing well overall.  Blood counts are not adequate to proceed.  Question of lump on testicle but cannot be palpated today. He is doing well with smoking cessation.      Plan:   1) Reviewed labs with Lazaro, will delay the start of Maintenance therapy.  2) Discussed testicular concern, he will let us know if he palpates it again.  If he does could consider a testicular U/S but no signs of mass or lump today.   3) Continue to monitor for new symptoms of thrombosis while off of lovenox. Low threshold to repeat U/S if any signs of thrombus.   4) Lazaro will have a repeat CT at the end of April per Dr Coronel. His last note indicates he would like to follow him with imaging until he has complete resolution. Will coordinate this with his next visit.  5) Continue Vit D 3 2000 IU daily (last level was 19 on 3/5/20).  Recheck level in August.  6) Day 29 Induction LP  deferred, plan to make-up dose in 5th cycle of Maintenance therapy.  7) TPMT and NUDT15 genotype is normal, plan for regular dose thiopurines.  8) Given significant spinal headache history will plan for IV caffeine following LPs in the future.   9) Lazaro is doing well with smoking cessation, continue to follow.  10) Reviewed Maintenance calendar and medications with Lazaro in detail.  Also discussed our target ANC and the importance of taking his chemotherapy medications regularly without missed doses.   11) RTC for recheck of counts to see if adequate to start Maintenance therapy.  Also discussed with Lazaro that we may modify his schedule slightly to coordinate with Dr Fischer's in the future if needed.

## 2020-04-16 NOTE — LETTER
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 Lazaro presented with acute onset cough and was found to have an anterior mediastinal mass and malignant left-sided pleural effusion.  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  He was admitted to [facility] oncology service 8/30 and started on treatment per [id number].  He had a pleural effusion that required a chest tube and a pericardial effusion that was not drained. His Induction was complicated by cardiac compression secondary to his mass leading to tamponade, this improved through out his hospital stay.  He also had dysphagia that improved with treatment of his mass, swallow study was normal. His course was recently complicated by extensive bilateral UE DVTs, he remains on anticoagulation.  Most recently his course was complicated by the development of severe asparaginase induced pancreatitis. He became quite ill with SIRS and associated hypotension, he required pressor support in the ICU.  Fortunately Lazaro recovered well and his subsequent imaging has continued to show improvement.  He comes to clinic today to start Maintenance therapy.    HPI:   Lazaor comes to clinic today by himself.  He has been doing well since his last visit. He is eating well, no GI upset or abdominal pain.  He has been trying to get out for more walks.  Energy level is good. He had some loose stool at the end of last week but this has since returned to normal.  Denies skin concerns or pain.  No fever.  No paresthesias.  Sleeping well at night, mood is good.  Lazaro reports he felt something on his left testicle while in the shower a few days ago, he is wondering if this is something to be concerned about.    Lazaro has done well with smoking cessation.  He is no  longer using patches.  His mom is living elsewhere to avoid exposure to his grandma and he feels this has been helpful since his mom is a smoker and being around it would tempt him.    Review of systems:  Pertinent positives reported in the HPI. All other systems in a complete and comprehensive review of systems were negative..    PMH:   Past Medical History:   Diagnosis Date     Acute necrotizing pancreatitis 11/07/2019    attributed to asparaginase     Acute pancreatitis due to PEGaspariginase therapy  11/15/2019     DVT of upper extremity (deep vein thrombosis) (H) 09/26/2019    Bilateral      Edema of upper extremity 10/17/2019     Folliculitis 10/17/2019     Migraine 2006    have resolved     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   -- Spinal HA following LP  -- TPMT shows Intermediate activity with normal TPMT/NUDT15 genotype  -- Factor V leiden and prothrombin gene mutation negative  -- Necrotizing pancreatitis secondary to asparaginase    [facility]:   Family History   Problem Relation Age of Onset     No Known Problems Mother      No Known Problems Father      Asthma Brother      Thyroid Cancer Paternal Grandmother      Melanoma Paternal Aunt      Social History: Lazaro previously lived at home with his dad and step mom in [place] but is now staying with his grandma in [place].  He has stopped working since [employer] restriction went into place.    Current Medications:  Current Outpatient Medications   Medication Sig Dispense Refill     diphenhydrAMINE (BENADRYL) 25 MG capsule Take 1-2 capsules (25-50 mg) by mouth every 6 hours as needed (Breakthrough Nausea and Vomiting ) 30 capsule 1     lidocaine-prilocaine (EMLA) 2.5-2.5 % external cream Apply topically as needed for other (prior to port access) 30 g 6     ondansetron (ZOFRAN-ODT) 8 MG ODT tab Take 1 tablet (8 mg) by mouth every 8 hours as needed for nausea 20 tablet 6     pantoprazole 40 MG PO EC tablet Take 1 tablet (40 mg) by mouth every morning (before  "breakfast) 30 tablet 2     polyethylene glycol (MIRALAX/GLYCOLAX) packet Take 17 g by mouth daily 30 packet 0     scopolamine (TRANSDERM) 1 MG/3DAYS 72 hr patch Place 1 patch onto the skin every 72 hours 10 patch 3     sennosides (SENOKOT) 8.6 MG tablet Take 2 tablets by mouth 2 times daily as needed for constipation (Patient not taking: Reported on 4/9/2020) 60 each 1     sulfamethoxazole-trimethoprim (BACTRIM DS) 800-160 MG tablet Take 1 tablet by mouth Every Mon, Tues two times daily 16 tablet 11     Vitamin D, Cholecalciferol, 25 MCG (1000 UT) TABS Take 2 tablets (2,000 Units) by mouth daily 60 tablet 3     Lazaro reports he is taking bactrim and protonix regularly.      Received influenza vaccine for the 6764-2964 season.      Physical Exam:   Temp:  [98.4  F (36.9  C)] 98.4  F (36.9  C)  Pulse:  [95] 95  Resp:  [16] 16  BP: (123)/(62) 123/62  SpO2:  [100 %] 100 %     Wt Readings from Last 4 Encounters:   04/16/20 86.6 kg (190 lb 14.7 oz)   04/09/20 86.1 kg (189 lb 13.1 oz)   04/02/20 87.7 kg (193 lb 5.5 oz)   03/26/20 85.3 kg (188 lb 0.8 oz)     Ht Readings from Last 2 Encounters:   04/16/20 1.84 m (6' 0.44\")   04/09/20 1.829 m (6' 0.01\")     Lazaro is now above his baseline weight of 180 lbs.  General: Lazaro is alert, interactive and appropriate; bright affect today and very talkative  HEENT: Skull is atrauamatic and normocephalic.  Full head of hair. PERRLA, sclera are non icteric and not injected, EOM are intact, gaze slightly disconjugate which is his baseline. Nares are patent without drainage. Oropharynx clear, no plaques noted. Buccal mucosa and tongue clear. MMM.  Neck:  Supple without lymphadenopathy.   Lymph: There is no cervical, supraclavicular, axillary, lymphadenopathy palpated.  Cardiovascular:  HR is regular, S1, S2 no murmur.  Capillary refill is < 2 seconds. Right arm without edema or erythema.  No pitting edema.  Cap refill < 2 sec. Peripheral pulses 2+/=. He has mildly distended veins " noted on the left upper chest, not extending up to the neck, overlying the pectoralis.   Respiratory: No cough noted. Respirations are easy.  Lungs are clear to auscultation throughout.  No crackles or wheezes.  Gastrointestinal:  BS present in all quadrants.  Abdomen is soft and flat.  Lazaro denies LUQ discomfort, no pain with palpation. No hepatosplenomegaly or masses are palpated.  : Testes descended bilaterally, I do not appreciate a mass or lump on the left testis, Lazaro is also unable to find it.  Skin: No skin lesions noted.  Neurological:  CN 2-12 grossly intact. Gait is normal.  No issues with balance. Sensation intact in hands and feet.     Musculoskeletal:  Good strength and ROM in all extremities.  Strong dorsiflexion at ankles and great toes (5/5) bilaterally without any pain at the Achilles.    Labs:   Results for orders placed or performed during the hospital encounter of 04/16/20   CBC with platelets differential     Status: Abnormal   Result Value Ref Range    WBC 1.6 (L) 4.0 - 11.0 10e9/L    RBC Count 3.10 (L) 4.4 - 5.9 10e12/L    Hemoglobin 9.4 (L) 13.3 - 17.7 g/dL    Hematocrit 26.4 (L) 40.0 - 53.0 %    MCV 85 78 - 100 fl    MCH 30.3 26.5 - 33.0 pg    MCHC 35.6 31.5 - 36.5 g/dL    RDW 12.0 10.0 - 15.0 %    Platelet Count 273 150 - 450 10e9/L    Diff Method Manual Differential     % Neutrophils 31.3 %    % Lymphocytes 47.2 %    % Monocytes 17.9 %    % Eosinophils 0.9 %    % Basophils 0.0 %    % Myelocytes 2.7 %    Absolute Neutrophil 0.5 (L) 1.6 - 8.3 10e9/L    Absolute Lymphocytes 0.8 0.8 - 5.3 10e9/L    Absolute Monocytes 0.3 0.0 - 1.3 10e9/L    Absolute Eosinophils 0.0 0.0 - 0.7 10e9/L    Absolute Basophils 0.0 0.0 - 0.2 10e9/L    Absolute Myelocytes 0.0 0 10e9/L    Polychromasia Slight     Platelet Estimate Confirming automated cell count    Comprehensive metabolic panel     Status: Abnormal   Result Value Ref Range    Sodium 144 133 - 144 mmol/L    Potassium 3.6 3.4 - 5.3 mmol/L     Chloride 112 (H) 94 - 109 mmol/L    Carbon Dioxide 27 20 - 32 mmol/L    Anion Gap 4 3 - 14 mmol/L    Glucose 98 70 - 99 mg/dL    Urea Nitrogen 14 7 - 30 mg/dL    Creatinine 0.64 (L) 0.66 - 1.25 mg/dL    GFR Estimate >90 >60 mL/min/[1.73_m2]    GFR Estimate If Black >90 >60 mL/min/[1.73_m2]    Calcium 8.2 (L) 8.5 - 10.1 mg/dL    Bilirubin Total 0.2 0.2 - 1.3 mg/dL    Albumin 3.7 3.4 - 5.0 g/dL    Protein Total 6.2 (L) 6.8 - 8.8 g/dL    Alkaline Phosphatase 68 40 - 150 U/L    ALT 22 0 - 70 U/L    AST 11 0 - 45 U/L       Assessment:  Lazaro Lund is a 19 year old young man with T cell lymphoblastic lymphoma (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  He is being treated per [facility] protocol [id number].   He's had a CRu following Induction with a CR at the end of Consolidation. His course has been complicated by extensive bilateral DVT and severe necrotizing pancreatitis.  He completed treatment with lovenox. Recent abdominal CT shows continued improvement in regards to the sequelae from his pancreatitis.  Asparaginase will be permanently discontinued from his treatment regimen. He is on Vit D for deficiency.  He comes to clinic today to start Maintenance therapy.  He is doing well overall.  Blood counts are not adequate to proceed.  Question of lump on testicle but cannot be palpated today. He is doing well with smoking cessation.      Plan:   1) Reviewed labs with Lazaro, will delay the start of Maintenance therapy.  2) Discussed testicular concern, he will let us know if he palpates it again.  If he does could consider a testicular U/S but no signs of mass or lump today.   3) Continue to monitor for new symptoms of thrombosis while off of lovenox. Low threshold to repeat U/S if any signs of thrombus.   4) Lazaro will have a repeat CT at the end of April per Dr Coronel. His last note indicates he would like to follow him with imaging until he has complete resolution. Will coordinate this with his next visit.  5) Continue Vit D 3 2000 IU  daily (last level was 19 on 3/5/20).  Recheck level in August.  6) Day 29 Induction LP deferred, plan to make-up dose in 5th cycle of Maintenance therapy.  7) TPMT and NUDT15 genotype is normal, plan for regular dose thiopurines.  8) Given significant spinal headache history will plan for IV caffeine following LPs in the future.   9) Lazaro is doing well with smoking cessation, continue to follow.  10) Reviewed Maintenance calendar and medications with Lazaro in detail.  Also discussed our target ANC and the importance of taking his chemotherapy medications regularly without missed doses.   11) RTC for recheck of counts to see if adequate to start Maintenance therapy.  Also discussed with Lazaro that we may modify his schedule slightly to coordinate with Dr Fischer's in the future if needed.      YOHAN Dunn CNP

## 2020-04-16 NOTE — PROGRESS NOTES
This is a recent snapshot of the patient's [facility] medical record.  For current drug dose and complete information and questions, call [phone] or In Basket pool, [facility] home infusion ([id number])  CSN Number:  [id number]

## 2020-04-22 ENCOUNTER — TELEPHONE (OUTPATIENT)
Dept: PEDIATRIC HEMATOLOGY/ONCOLOGY | Facility: CLINIC | Age: 20
End: 2020-04-22

## 2020-04-22 ENCOUNTER — ANESTHESIA EVENT (OUTPATIENT)
Dept: PEDIATRICS | Facility: CLINIC | Age: 20
End: 2020-04-22
Payer: COMMERCIAL

## 2020-04-22 NOTE — TELEPHONE ENCOUNTER
I tried calling the patient about completing their wellness screening for their future appointment. There was no answer, so I left a message for them to call us back.

## 2020-04-23 ENCOUNTER — HOSPITAL ENCOUNTER (OUTPATIENT)
Facility: CLINIC | Age: 20
Discharge: HOME | End: 2020-04-23
Attending: RADIOLOGY | Admitting: RADIOLOGY
Payer: COMMERCIAL

## 2020-04-23 ENCOUNTER — INFUSION THERAPY VISIT (OUTPATIENT)
Dept: INFUSION THERAPY | Facility: CLINIC | Age: 20
End: 2020-04-23
Attending: NURSE PRACTITIONER
Payer: COMMERCIAL

## 2020-04-23 ENCOUNTER — HOSPITAL ENCOUNTER (OUTPATIENT)
Dept: CT IMAGING | Facility: CLINIC | Age: 20
End: 2020-04-23
Attending: INTERNAL MEDICINE
Payer: COMMERCIAL

## 2020-04-23 ENCOUNTER — OFFICE VISIT (OUTPATIENT)
Dept: PEDIATRIC HEMATOLOGY/ONCOLOGY | Facility: CLINIC | Age: 20
End: 2020-04-23
Attending: NURSE PRACTITIONER
Payer: COMMERCIAL

## 2020-04-23 ENCOUNTER — ANESTHESIA (OUTPATIENT)
Dept: PEDIATRICS | Facility: CLINIC | Age: 20
End: 2020-04-23
Payer: COMMERCIAL

## 2020-04-23 VITALS
TEMPERATURE: 97.7 F | RESPIRATION RATE: 22 BRPM | OXYGEN SATURATION: 100 % | BODY MASS INDEX: 25.54 KG/M2 | HEIGHT: 73 IN | DIASTOLIC BLOOD PRESSURE: 67 MMHG | HEART RATE: 68 BPM | SYSTOLIC BLOOD PRESSURE: 128 MMHG | WEIGHT: 192.68 LBS

## 2020-04-23 VITALS
DIASTOLIC BLOOD PRESSURE: 57 MMHG | OXYGEN SATURATION: 100 % | BODY MASS INDEX: 25.54 KG/M2 | WEIGHT: 192.68 LBS | SYSTOLIC BLOOD PRESSURE: 123 MMHG | HEART RATE: 70 BPM | TEMPERATURE: 99.3 F | HEIGHT: 73 IN | RESPIRATION RATE: 20 BRPM

## 2020-04-23 DIAGNOSIS — K85.31 DRUG-INDUCED ACUTE PANCREATITIS WITH UNINFECTED NECROSIS: Primary | ICD-10-CM

## 2020-04-23 DIAGNOSIS — K85.91 NECROTIZING PANCREATITIS: ICD-10-CM

## 2020-04-23 DIAGNOSIS — C83.50 T LYMPHOBLASTIC LYMPHOMA (H): Primary | ICD-10-CM

## 2020-04-23 DIAGNOSIS — C83.50 T LYMPHOBLASTIC LYMPHOMA (H): ICD-10-CM

## 2020-04-23 LAB
ALBUMIN SERPL-MCNC: 4 G/DL (ref 3.4–5)
ALP SERPL-CCNC: 70 U/L (ref 40–150)
ALT SERPL W P-5'-P-CCNC: 21 U/L (ref 0–70)
ANION GAP SERPL CALCULATED.3IONS-SCNC: 4 MMOL/L (ref 3–14)
AST SERPL W P-5'-P-CCNC: 9 U/L (ref 0–45)
BASOPHILS # BLD AUTO: 0 10E9/L (ref 0–0.2)
BASOPHILS NFR BLD AUTO: 0.9 %
BILIRUB SERPL-MCNC: 0.3 MG/DL (ref 0.2–1.3)
BUN SERPL-MCNC: 14 MG/DL (ref 7–30)
CALCIUM SERPL-MCNC: 8.1 MG/DL (ref 8.5–10.1)
CHLORIDE SERPL-SCNC: 109 MMOL/L (ref 94–109)
CO2 SERPL-SCNC: 28 MMOL/L (ref 20–32)
CREAT SERPL-MCNC: 0.73 MG/DL (ref 0.66–1.25)
DIFFERENTIAL METHOD BLD: ABNORMAL
EOSINOPHIL # BLD AUTO: 0 10E9/L (ref 0–0.7)
EOSINOPHIL NFR BLD AUTO: 0 %
ERYTHROCYTE [DISTWIDTH] IN BLOOD BY AUTOMATED COUNT: 17.2 % (ref 10–15)
GFR SERPL CREATININE-BSD FRML MDRD: >90 ML/MIN/{1.73_M2}
GLUCOSE SERPL-MCNC: 97 MG/DL (ref 70–99)
HCT VFR BLD AUTO: 31.5 % (ref 40–53)
HGB BLD-MCNC: 10.5 G/DL (ref 13.3–17.7)
IMM GRANULOCYTES # BLD: 0 10E9/L (ref 0–0.4)
IMM GRANULOCYTES NFR BLD: 0.4 %
LYMPHOCYTES # BLD AUTO: 0.6 10E9/L (ref 0.8–5.3)
LYMPHOCYTES NFR BLD AUTO: 26.5 %
MCH RBC QN AUTO: 30.4 PG (ref 26.5–33)
MCHC RBC AUTO-ENTMCNC: 33.3 G/DL (ref 31.5–36.5)
MCV RBC AUTO: 91 FL (ref 78–100)
MONOCYTES # BLD AUTO: 0.4 10E9/L (ref 0–1.3)
MONOCYTES NFR BLD AUTO: 16.8 %
NEUTROPHILS # BLD AUTO: 1.3 10E9/L (ref 1.6–8.3)
NEUTROPHILS NFR BLD AUTO: 55.4 %
NRBC # BLD AUTO: 0 10*3/UL
NRBC BLD AUTO-RTO: 0 /100
PLATELET # BLD AUTO: 351 10E9/L (ref 150–450)
POTASSIUM SERPL-SCNC: 4.2 MMOL/L (ref 3.4–5.3)
PROT SERPL-MCNC: 6.6 G/DL (ref 6.8–8.8)
RBC # BLD AUTO: 3.45 10E12/L (ref 4.4–5.9)
SODIUM SERPL-SCNC: 141 MMOL/L (ref 133–144)
WBC # BLD AUTO: 2.3 10E9/L (ref 4–11)

## 2020-04-23 PROCEDURE — 85025 COMPLETE CBC W/AUTO DIFF WBC: CPT | Performed by: NURSE PRACTITIONER

## 2020-04-23 PROCEDURE — 80053 COMPREHEN METABOLIC PANEL: CPT | Performed by: NURSE PRACTITIONER

## 2020-04-23 PROCEDURE — 37000008 ZZH ANESTHESIA TECHNICAL FEE, 1ST 30 MIN: Performed by: PEDIATRICS

## 2020-04-23 PROCEDURE — 25000125 ZZHC RX 250: Performed by: RADIOLOGY

## 2020-04-23 PROCEDURE — 25800030 ZZH RX IP 258 OP 636: Performed by: RADIOLOGY

## 2020-04-23 PROCEDURE — 40000165 ZZH STATISTIC POST-PROCEDURE RECOVERY CARE: Performed by: PEDIATRICS

## 2020-04-23 PROCEDURE — 96365 THER/PROPH/DIAG IV INF INIT: CPT | Performed by: PEDIATRICS

## 2020-04-23 PROCEDURE — 40001011 ZZH STATISTIC PRE-PROCEDURE NURSING ASSESSMENT: Performed by: PEDIATRICS

## 2020-04-23 PROCEDURE — 89050 BODY FLUID CELL COUNT: CPT | Performed by: PEDIATRICS

## 2020-04-23 PROCEDURE — 25000125 ZZHC RX 250: Performed by: INTERNAL MEDICINE

## 2020-04-23 PROCEDURE — 96450 CHEMOTHERAPY INTO CNS: CPT | Performed by: NURSE PRACTITIONER

## 2020-04-23 PROCEDURE — 25000128 H RX IP 250 OP 636: Performed by: NURSE PRACTITIONER

## 2020-04-23 PROCEDURE — 36591 DRAW BLOOD OFF VENOUS DEVICE: CPT | Performed by: PEDIATRICS

## 2020-04-23 PROCEDURE — 25000128 H RX IP 250 OP 636: Mod: ZF | Performed by: PEDIATRICS

## 2020-04-23 PROCEDURE — 25000128 H RX IP 250 OP 636: Performed by: NURSE ANESTHETIST, CERTIFIED REGISTERED

## 2020-04-23 PROCEDURE — 25800030 ZZH RX IP 258 OP 636: Performed by: PEDIATRICS

## 2020-04-23 PROCEDURE — 74160 CT ABDOMEN W/CONTRAST: CPT

## 2020-04-23 PROCEDURE — 25000128 H RX IP 250 OP 636: Mod: ZF

## 2020-04-23 PROCEDURE — 25800030 ZZH RX IP 258 OP 636: Mod: ZF | Performed by: PEDIATRICS

## 2020-04-23 PROCEDURE — 25800030 ZZH RX IP 258 OP 636: Performed by: NURSE ANESTHETIST, CERTIFIED REGISTERED

## 2020-04-23 PROCEDURE — 25500064 ZZH RX 255 OP 636: Performed by: INTERNAL MEDICINE

## 2020-04-23 PROCEDURE — 25000128 H RX IP 250 OP 636: Mod: JW | Performed by: PEDIATRICS

## 2020-04-23 PROCEDURE — 96409 CHEMO IV PUSH SNGL DRUG: CPT

## 2020-04-23 RX ORDER — LIDOCAINE 40 MG/G
CREAM TOPICAL
Status: DISCONTINUED | OUTPATIENT
Start: 2020-04-23 | End: 2020-04-23 | Stop reason: HOSPADM

## 2020-04-23 RX ORDER — PREDNISONE 10 MG/1
TABLET ORAL
Qty: 43 TABLET | Refills: 2 | Status: SHIPPED | OUTPATIENT
Start: 2020-04-23 | End: 2020-04-23

## 2020-04-23 RX ORDER — PROPOFOL 10 MG/ML
INJECTION, EMULSION INTRAVENOUS CONTINUOUS PRN
Status: DISCONTINUED | OUTPATIENT
Start: 2020-04-23 | End: 2020-04-23

## 2020-04-23 RX ORDER — PANTOPRAZOLE SODIUM 40 MG/1
40 TABLET, DELAYED RELEASE ORAL
Qty: 30 TABLET | Refills: 2 | Status: SHIPPED | OUTPATIENT
Start: 2020-04-23 | End: 2020-12-29

## 2020-04-23 RX ORDER — SODIUM CHLORIDE, SODIUM LACTATE, POTASSIUM CHLORIDE, CALCIUM CHLORIDE 600; 310; 30; 20 MG/100ML; MG/100ML; MG/100ML; MG/100ML
INJECTION, SOLUTION INTRAVENOUS CONTINUOUS PRN
Status: DISCONTINUED | OUTPATIENT
Start: 2020-04-23 | End: 2020-04-23

## 2020-04-23 RX ORDER — IOPAMIDOL 612 MG/ML
100 INJECTION, SOLUTION INTRAVASCULAR ONCE
Status: COMPLETED | OUTPATIENT
Start: 2020-04-23 | End: 2020-04-23

## 2020-04-23 RX ORDER — SULFAMETHOXAZOLE/TRIMETHOPRIM 800-160 MG
1 TABLET ORAL
Qty: 16 TABLET | Refills: 11 | Status: ON HOLD | OUTPATIENT
Start: 2020-04-27 | End: 2020-08-06

## 2020-04-23 RX ORDER — ONDANSETRON 2 MG/ML
INJECTION INTRAMUSCULAR; INTRAVENOUS PRN
Status: DISCONTINUED | OUTPATIENT
Start: 2020-04-23 | End: 2020-04-23

## 2020-04-23 RX ORDER — MERCAPTOPURINE 50 MG/1
TABLET ORAL
Qty: 96 TABLET | Refills: 2 | Status: SHIPPED | OUTPATIENT
Start: 2020-04-23 | End: 2020-06-03

## 2020-04-23 RX ORDER — CAFFEINE AND SODIUM BENZOATE 125 MG/ML
500 INJECTION, SOLUTION INTRAMUSCULAR; INTRAVENOUS ONCE
Status: DISCONTINUED | OUTPATIENT
Start: 2020-04-23 | End: 2020-04-23

## 2020-04-23 RX ORDER — PROPOFOL 10 MG/ML
INJECTION, EMULSION INTRAVENOUS PRN
Status: DISCONTINUED | OUTPATIENT
Start: 2020-04-23 | End: 2020-04-23

## 2020-04-23 RX ORDER — HEPARIN SODIUM (PORCINE) LOCK FLUSH IV SOLN 100 UNIT/ML 100 UNIT/ML
5 SOLUTION INTRAVENOUS
Status: DISCONTINUED | OUTPATIENT
Start: 2020-04-23 | End: 2020-04-23 | Stop reason: HOSPADM

## 2020-04-23 RX ORDER — PREDNISONE 10 MG/1
TABLET ORAL
Qty: 43 TABLET | Refills: 2 | Status: ON HOLD | OUTPATIENT
Start: 2020-04-23 | End: 2020-10-06

## 2020-04-23 RX ORDER — HEPARIN SODIUM (PORCINE) LOCK FLUSH IV SOLN 100 UNIT/ML 100 UNIT/ML
SOLUTION INTRAVENOUS
Status: COMPLETED
Start: 2020-04-23 | End: 2020-04-23

## 2020-04-23 RX ORDER — HEPARIN SODIUM,PORCINE 10 UNIT/ML
5 VIAL (ML) INTRAVENOUS ONCE
Status: COMPLETED | OUTPATIENT
Start: 2020-04-23 | End: 2020-04-23

## 2020-04-23 RX ADMIN — PROPOFOL 300 MCG/KG/MIN: 10 INJECTION, EMULSION INTRAVENOUS at 09:15

## 2020-04-23 RX ADMIN — HEPARIN 500 UNITS: 100 SYRINGE at 11:18

## 2020-04-23 RX ADMIN — HEPARIN SODIUM (PORCINE) LOCK FLUSH IV SOLN 100 UNIT/ML 500 UNITS: 100 SOLUTION at 11:18

## 2020-04-23 RX ADMIN — SODIUM CHLORIDE 65 ML: 9 INJECTION, SOLUTION INTRAVENOUS at 08:25

## 2020-04-23 RX ADMIN — ONDANSETRON 4 MG: 2 INJECTION INTRAMUSCULAR; INTRAVENOUS at 09:15

## 2020-04-23 RX ADMIN — SODIUM CHLORIDE, POTASSIUM CHLORIDE, SODIUM LACTATE AND CALCIUM CHLORIDE: 600; 310; 30; 20 INJECTION, SOLUTION INTRAVENOUS at 09:15

## 2020-04-23 RX ADMIN — PROPOFOL 100 MG: 10 INJECTION, EMULSION INTRAVENOUS at 09:15

## 2020-04-23 RX ADMIN — VINCRISTINE SULFATE 2 MG: 1 INJECTION, SOLUTION INTRAVENOUS at 11:07

## 2020-04-23 RX ADMIN — CAFFEINE AND SODIUM BENZOATE 500 MG: 125 INJECTION, SOLUTION INTRAMUSCULAR; INTRAVENOUS at 09:36

## 2020-04-23 RX ADMIN — IOPAMIDOL 97 ML: 612 INJECTION, SOLUTION INTRAVENOUS at 08:18

## 2020-04-23 RX ADMIN — PROPOFOL 100 MG: 10 INJECTION, EMULSION INTRAVENOUS at 09:16

## 2020-04-23 RX ADMIN — HEPARIN, PORCINE (PF) 10 UNIT/ML INTRAVENOUS SYRINGE 5 ML: at 10:49

## 2020-04-23 RX ADMIN — METHOTREXATE: 25 INJECTION INTRA-ARTERIAL; INTRAMUSCULAR; INTRATHECAL; INTRAVENOUS at 09:25

## 2020-04-23 ASSESSMENT — MIFFLIN-ST. JEOR
SCORE: 1925.26
SCORE: 1925.26

## 2020-04-23 ASSESSMENT — PAIN SCALES - GENERAL: PAINLEVEL: NO PAIN (0)

## 2020-04-23 NOTE — ANESTHESIA CARE TRANSFER NOTE
Patient: Lazaro Lund    Procedure(s):  Lumbar puncture with intrathecal Chemotherapy (CD)    Diagnosis: Lymphoma (H) [C85.90]  Diagnosis Additional Information: No value filed.    Anesthesia Type:   General     Note:  Anesthesia Care Transfer Notewriter    Vitals: (Last set prior to Anesthesia Care Transfer)    CRNA VITALS  4/23/2020 0859 - 4/23/2020 0936      4/23/2020             NIBP:  100/44    Pulse:  77    Temp:  36.9  C (98.4  F)    SpO2:  100 %    Resp Rate (observed):  11    EKG:  Sinus rhythm                Electronically Signed By: YOHAN JUAREZ CRNA  April 23, 2020  9:36 AM

## 2020-04-23 NOTE — PROCEDURES
Procedure Note:     A Lumbar Puncture was performed in the Pediatric Sedation Suite. Informed consent was obtained prior to the procedure. Lazaro Lund was identified by facial recognition and ID arm band. A time-out was performed. Lazaro Lund was then placed in the left lateral decubitus position and the lumbosacral area was sterily prepped using Chloraprep followed by drape placement. Anatomic landmarks were identified by palpation. Then, a 22 gauge, 3.5 inch spinal needle was easily inserted with 1.5 inch between the skin and the hub into the L3-L4 interspace. On the first attempt approximately 3 mL of clear and colorless cerebrospinal fluid was obtained to be sent to the lab for cell count analysis and cytospin. Following that, 15 mg of intrathecal methotrexate in 6 mL of preservative-free normal saline was infused without resistance. The needle was removed and a Band-Aid applied. Lazaro Lund tolerated this procedure very well.    Procedure was supervised by Dr. Marleny Brewer.    Jus Mast MD  Pediatric Hematology/Oncology Fellow    I was present for the entirety of the procedure documented above and I directly observed Dr. Mast while she performed it. I agree with the note documented above.  Marleny Brewer MD, MPH    [facility]  Division of Pediatric Hematology/Oncology

## 2020-04-23 NOTE — PROGRESS NOTES
Infusion Nursing Note    Lazaro PLUNKETT Ashely Presents to [facility] today for: Vincristine after LP    Due to : T lymphoblastic lymphoma (H)    Intravenous Access/Labs: Port accessed prior to arrival to clinic at [facility] sedation.       Infusion Note: Vincristine given to gravity. Blood return noted pre/mid/post infusion. Port heparin locked and deaccessed.     Discharge Plan:   Patient verbalized understanding of discharge instructions.  Home meds sent with pt. Pt left [facility] in stable condition at conclusion of appt.

## 2020-04-23 NOTE — ANESTHESIA POSTPROCEDURE EVALUATION
Anesthesia POST Procedure Evaluation    Patient: Lazaro Lund   MRN:     [id number] Gender:   male   Age:    19 year old :      2000        Preoperative Diagnosis: Lymphoma (H) [C85.90]   Procedure(s):  Lumbar puncture with intrathecal Chemotherapy (CD)   Postop Comments: No value filed.     Anesthesia Type: General       Disposition: Outpatient   Postop Pain Control: Uneventful            Sign Out: Well controlled pain   PONV: No   Neuro/Psych: Uneventful            Sign Out: Acceptable/Baseline neuro status   Airway/Respiratory: Uneventful            Sign Out: Acceptable/Baseline resp. status   CV/Hemodynamics: Uneventful            Sign Out: Acceptable CV status   Other NRE: NONE   DID A NON-ROUTINE EVENT OCCUR? No         Last Anesthesia Record Vitals:  CRNA VITALS  2020 0859 - 2020 0959      2020             NIBP:  100/44    Pulse:  77    Temp:  36.9  C (98.4  F)    SpO2:  100 %    Resp Rate (observed):  11    EKG:  Sinus rhythm          Last PACU Vitals:  Vitals Value Taken Time   /46 2020  9:40 AM   Temp 36.3  C (97.4  F) 2020  9:40 AM   Pulse 66 2020  9:40 AM   Resp 20 2020  9:40 AM   SpO2 100 % 2020  9:40 AM   Temp src     NIBP     Pulse     SpO2     Resp     Temp     Ht Rate     Temp 2           Electronically Signed By: Jia Angeles MD, 2020, 10:12 AM

## 2020-04-23 NOTE — PROGRESS NOTES
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 Lazaro presented with acute onset cough and was found to have an anterior mediastinal mass and malignant left-sided pleural effusion.  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  He was admitted to [facility] oncology service 8/30 and started on treatment per [id number].  He had a pleural effusion that required a chest tube and a pericardial effusion that was not drained. His Induction was complicated by cardiac compression secondary to his mass leading to tamponade, this improved through out his hospital stay.  He also had dysphagia that improved with treatment of his mass, swallow study was normal. His course was recently complicated by extensive bilateral UE DVTs, he remains on anticoagulation.  Most recently his course was complicated by the development of severe asparaginase induced pancreatitis. He became quite ill with SIRS and associated hypotension, he required pressor support in the ICU.  Fortunately Lazaro recovered well and his subsequent imaging has continued to show improvement.  He comes to clinic today to start Maintenance therapy.    HPI:   Lazaro comes to clinic today by himself.  He has been doing well since his last visit. He is eating well, no GI upset or abdominal pain.  He has been trying to get out for more walks.  Energy level is good.  He is having a hard time getting a full night's sleep, sleeps for 6-7 hours. Denies skin concerns or pain.  No fever.  No paresthesias.  Mood is good.    Lazaro has done well with smoking cessation.  He is no longer using patches.  His mom is living elsewhere to avoid exposure to his grandma and he feels this has been helpful since his mom is a smoker and being around it would tempt him.    Review of systems:  Pertinent positives reported in the HPI. All other systems in a  complete and comprehensive review of systems were negative..    PMH:   Past Medical History:   Diagnosis Date     Acute necrotizing pancreatitis 11/07/2019    attributed to asparaginase     Acute pancreatitis due to PEGaspariginase therapy  11/15/2019     DVT of upper extremity (deep vein thrombosis) (H) 09/26/2019    Bilateral      Edema of upper extremity 10/17/2019     Folliculitis 10/17/2019     Migraine 2006    have resolved     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   -- Spinal HA following LP  -- TPMT shows Intermediate activity with normal TPMT/NUDT15 genotype  -- Factor V leiden and prothrombin gene mutation negative  -- Necrotizing pancreatitis secondary to asparaginase    [facility]:   Family History   Problem Relation Age of Onset     No Known Problems Mother      No Known Problems Father      Asthma Brother      Thyroid Cancer Paternal Grandmother      Melanoma Paternal Aunt      Social History: Lazaro previously lived at home with his dad and step mom in [place] but is now staying with his grandma in [place].  He has stopped working since [employer] restriction went into place.    Current Medications:  Current Outpatient Medications on File Prior to Visit   Medication Sig Dispense Refill     diphenhydrAMINE (BENADRYL) 25 MG capsule Take 1-2 capsules (25-50 mg) by mouth every 6 hours as needed (Breakthrough Nausea and Vomiting ) 30 capsule 1     lidocaine-prilocaine (EMLA) 2.5-2.5 % external cream Apply topically as needed for other (prior to port access) 30 g 6     ondansetron (ZOFRAN-ODT) 8 MG ODT tab Take 1 tablet (8 mg) by mouth every 8 hours as needed for nausea 20 tablet 6     polyethylene glycol (MIRALAX/GLYCOLAX) packet Take 17 g by mouth daily 30 packet 0     scopolamine (TRANSDERM) 1 MG/3DAYS 72 hr patch Place 1 patch onto the skin every 72 hours 10 patch 3     sennosides (SENOKOT) 8.6 MG tablet Take 2 tablets by mouth 2 times daily as needed for constipation (Patient not taking: Reported on  "4/9/2020) 60 each 1     Vitamin D, Cholecalciferol, 25 MCG (1000 UT) TABS Take 2 tablets (2,000 Units) by mouth daily 60 tablet 3       Lazaro reports he is taking bactrim and protonix regularly.      Received influenza vaccine for the 4198-9468 season.      Physical Exam:   Temp:  [98.3  F (36.8  C)] 98.3  F (36.8  C)  Pulse:  [83] 83  Resp:  [16] 16  BP: (113)/(63) 113/63  Cuff Mean (mmHg):  [80] 80  SpO2:  [98 %] 98 %     Wt Readings from Last 4 Encounters:   04/23/20 87.4 kg (192 lb 10.9 oz)   04/16/20 86.6 kg (190 lb 14.7 oz)   04/09/20 86.1 kg (189 lb 13.1 oz)   04/02/20 87.7 kg (193 lb 5.5 oz)     Ht Readings from Last 2 Encounters:   04/23/20 1.842 m (6' 0.52\")   04/16/20 1.84 m (6' 0.44\")     Lazaro is now above his baseline weight of 180 lbs.  General: Lazaro is alert, interactive and appropriate; bright affect today and very talkative  HEENT: Skull is atrauamatic and normocephalic.  Full head of hair. PERRLA, sclera are non icteric and not injected, EOM are intact, gaze slightly disconjugate which is his baseline. Nares are patent without drainage. Oropharynx clear, no plaques noted. Buccal mucosa and tongue clear. MMM.  Neck:  Supple without lymphadenopathy.   Lymph: There is no cervical, supraclavicular, axillary, lymphadenopathy palpated.  Cardiovascular:  HR is regular, S1, S2 no murmur.  Capillary refill is < 2 seconds. Right arm without edema or erythema.  No pitting edema.  Cap refill < 2 sec. Peripheral pulses 2+/=. He has mildly distended veins noted on the left upper chest, not extending up to the neck, overlying the pectoralis.   Respiratory: No cough noted. Respirations are easy.  Lungs are clear to auscultation throughout.  No crackles or wheezes.  Gastrointestinal:  BS present in all quadrants.  Abdomen is soft and flat.  Lazaro denies LUQ discomfort, no pain with palpation. No hepatosplenomegaly or masses are palpated.  Skin: No skin lesions noted.  Neurological:  CN 2-12 grossly intact. " Gait is normal.  No issues with balance. Sensation intact in hands and feet.     Musculoskeletal:  Good strength and ROM in all extremities.  Strong dorsiflexion at ankles and great toes (5/5) bilaterally without any pain at the Achilles.    Labs:   Results for orders placed or performed during the hospital encounter of 04/23/20   CBC with platelets differential     Status: Abnormal   Result Value Ref Range    WBC 2.3 (L) 4.0 - 11.0 10e9/L    RBC Count 3.45 (L) 4.4 - 5.9 10e12/L    Hemoglobin 10.5 (L) 13.3 - 17.7 g/dL    Hematocrit 31.5 (L) 40.0 - 53.0 %    MCV 91 78 - 100 fl    MCH 30.4 26.5 - 33.0 pg    MCHC 33.3 31.5 - 36.5 g/dL    RDW 17.2 (H) 10.0 - 15.0 %    Platelet Count 351 150 - 450 10e9/L    Diff Method Automated Method     % Neutrophils 55.4 %    % Lymphocytes 26.5 %    % Monocytes 16.8 %    % Eosinophils 0.0 %    % Basophils 0.9 %    % Immature Granulocytes 0.4 %    Nucleated RBCs 0 0 /100    Absolute Neutrophil 1.3 (L) 1.6 - 8.3 10e9/L    Absolute Lymphocytes 0.6 (L) 0.8 - 5.3 10e9/L    Absolute Monocytes 0.4 0.0 - 1.3 10e9/L    Absolute Eosinophils 0.0 0.0 - 0.7 10e9/L    Absolute Basophils 0.0 0.0 - 0.2 10e9/L    Abs Immature Granulocytes 0.0 0 - 0.4 10e9/L    Absolute Nucleated RBC 0.0    Comprehensive metabolic panel     Status: Abnormal   Result Value Ref Range    Sodium 141 133 - 144 mmol/L    Potassium 4.2 3.4 - 5.3 mmol/L    Chloride 109 94 - 109 mmol/L    Carbon Dioxide 28 20 - 32 mmol/L    Anion Gap 4 3 - 14 mmol/L    Glucose 97 70 - 99 mg/dL    Urea Nitrogen 14 7 - 30 mg/dL    Creatinine 0.73 0.66 - 1.25 mg/dL    GFR Estimate >90 >60 mL/min/[1.73_m2]    GFR Estimate If Black >90 >60 mL/min/[1.73_m2]    Calcium 8.1 (L) 8.5 - 10.1 mg/dL    Bilirubin Total 0.3 0.2 - 1.3 mg/dL    Albumin 4.0 3.4 - 5.0 g/dL    Protein Total 6.6 (L) 6.8 - 8.8 g/dL    Alkaline Phosphatase 70 40 - 150 U/L    ALT 21 0 - 70 U/L    AST 9 0 - 45 U/L       Assessment:  Lazaro Lund is a 19 year old young man with 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  He is being treated per COG protocol [id number].   He's had a CRu following Induction with a CR at the end of Consolidation. His course has been complicated by extensive bilateral DVT and severe necrotizing pancreatitis.  He completed treatment with lovenox. Recent abdominal CT shows continued improvement in regards to the sequelae from his pancreatitis.  Asparaginase will be permanently discontinued from his treatment regimen. He is on Vit D for deficiency.  He comes to clinic today to start Maintenance therapy.  He is doing well overall.  Blood counts are adequate to proceed. He is doing well with smoking cessation.      Plan:   1) Reviewed labs with Lazaro, will start Maintenance therapy.  2) Continue to monitor for new symptoms of thrombosis while off of lovenox. Low threshold to repeat U/S if any signs of thrombus.   3) Lazaro will have a repeat CT today per Dr Coronel. His last note indicates he would like to follow him with imaging until he has complete resolution. Will coordinate this with his next visit.  4) Continue Vit D 3 2000 IU daily (last level was 19 on 3/5/20).  Recheck level in August.  5) Day 29 Induction LP deferred, plan to make-up dose in 5th cycle of Maintenance therapy.  6) TPMT and NUDT15 genotype is normal, plan for regular dose thiopurines.  7) Given significant spinal headache history will plan for IV caffeine following LPs in the future.   8) Lazaro is doing well with smoking cessation, continue to follow.  9) Reviewed Maintenance calendar and medications with Lazaro in detail.  Included dosing, frequency and administration.  Also discussed our target ANC and the importance of taking his chemotherapy medications regularly without missed doses.   10)  RTC in 3 weeks for D29, this will be one week early in order to be in line with Dr Fischer's schedule.  Meds filled today for 21 day supply so he should be able to fill again at next  visit.

## 2020-04-23 NOTE — LETTER
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 Lazaro presented with acute onset cough and was found to have an anterior mediastinal mass and malignant left-sided pleural effusion.  A CT guided biopsy was obtained at [facility] and pathology was consistent with T-cell leukemia vs lymphoma.  He was admitted to [facility] oncology service 8/30 and started on treatment per [id number].  He had a pleural effusion that required a chest tube and a pericardial effusion that was not drained. His Induction was complicated by cardiac compression secondary to his mass leading to tamponade, this improved through out his hospital stay.  He also had dysphagia that improved with treatment of his mass, swallow study was normal. His course was recently complicated by extensive bilateral UE DVTs, he remains on anticoagulation.  Most recently his course was complicated by the development of severe asparaginase induced pancreatitis. He became quite ill with SIRS and associated hypotension, he required pressor support in the ICU.  Fortunately Lazaro recovered well and his subsequent imaging has continued to show improvement.  He comes to clinic today to start Maintenance therapy.    HPI:   Lazaro comes to clinic today by himself.  He has been doing well since his last visit. He is eating well, no GI upset or abdominal pain.  He has been trying to get out for more walks.  Energy level is good.  He is having a hard time getting a full night's sleep, sleeps for 6-7 hours. Denies skin concerns or pain.  No fever.  No paresthesias.  Mood is good.    Lazaro has done well with smoking cessation.  He is no longer using patches.  His mom is living elsewhere to avoid exposure to his grandma and he feels this has been helpful since his mom is a smoker and being around it would tempt  him.    Review of systems:  Pertinent positives reported in the HPI. All other systems in a complete and comprehensive review of systems were negative..    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  He has stopped working since [employer] restriction went into place.    Current Medications:  Current Outpatient Medications on File Prior to Visit   Medication Sig Dispense Refill     diphenhydrAMINE (BENADRYL) 25 MG capsule Take 1-2 capsules (25-50 mg) by mouth every 6 hours as needed (Breakthrough Nausea and Vomiting ) 30 capsule 1     lidocaine-prilocaine (EMLA) 2.5-2.5 % external cream Apply topically as needed for other (prior to port access) 30 g 6     ondansetron (ZOFRAN-ODT) 8 MG ODT tab Take 1 tablet (8 mg) by mouth every 8 hours as needed for nausea 20 tablet 6     polyethylene glycol (MIRALAX/GLYCOLAX) packet Take 17 g by mouth daily 30 packet 0     scopolamine (TRANSDERM) 1 MG/3DAYS 72 hr patch Place 1 patch onto the skin every 72 hours 10 patch 3     sennosides (SENOKOT) 8.6 MG tablet Take 2  "tablets by mouth 2 times daily as needed for constipation (Patient not taking: Reported on 4/9/2020) 60 each 1     Vitamin D, Cholecalciferol, 25 MCG (1000 UT) TABS Take 2 tablets (2,000 Units) by mouth daily 60 tablet 3       Lazaro reports he is taking bactrim and protonix regularly.      Received influenza vaccine for the 1344-5130 season.      Physical Exam:   Temp:  [98.3  F (36.8  C)] 98.3  F (36.8  C)  Pulse:  [83] 83  Resp:  [16] 16  BP: (113)/(63) 113/63  Cuff Mean (mmHg):  [80] 80  SpO2:  [98 %] 98 %     Wt Readings from Last 4 Encounters:   04/23/20 87.4 kg (192 lb 10.9 oz)   04/16/20 86.6 kg (190 lb 14.7 oz)   04/09/20 86.1 kg (189 lb 13.1 oz)   04/02/20 87.7 kg (193 lb 5.5 oz)     Ht Readings from Last 2 Encounters:   04/23/20 1.842 m (6' 0.52\")   04/16/20 1.84 m (6' 0.44\")     Lazaro is now above his baseline weight of 180 lbs.  General: Lazaro is alert, interactive and appropriate; bright affect today and very talkative  HEENT: Skull is atrauamatic and normocephalic.  Full head of hair. PERRLA, sclera are non icteric and not injected, EOM are intact, gaze slightly disconjugate which is his baseline. Nares are patent without drainage. Oropharynx clear, no plaques noted. Buccal mucosa and tongue clear. MMM.  Neck:  Supple without lymphadenopathy.   Lymph: There is no cervical, supraclavicular, axillary, lymphadenopathy palpated.  Cardiovascular:  HR is regular, S1, S2 no murmur.  Capillary refill is < 2 seconds. Right arm without edema or erythema.  No pitting edema.  Cap refill < 2 sec. Peripheral pulses 2+/=. He has mildly distended veins noted on the left upper chest, not extending up to the neck, overlying the pectoralis.   Respiratory: No cough noted. Respirations are easy.  Lungs are clear to auscultation throughout.  No crackles or wheezes.  Gastrointestinal:  BS present in all quadrants.  Abdomen is soft and flat.  Lazaro denies LUQ discomfort, no pain with palpation. No hepatosplenomegaly or " masses are palpated.  Skin: No skin lesions noted.  Neurological:  CN 2-12 grossly intact. Gait is normal.  No issues with balance. Sensation intact in hands and feet.     Musculoskeletal:  Good strength and ROM in all extremities.  Strong dorsiflexion at ankles and great toes (5/5) bilaterally without any pain at the Achilles.    Labs:   Results for orders placed or performed during the hospital encounter of 04/23/20   CBC with platelets differential     Status: Abnormal   Result Value Ref Range    WBC 2.3 (L) 4.0 - 11.0 10e9/L    RBC Count 3.45 (L) 4.4 - 5.9 10e12/L    Hemoglobin 10.5 (L) 13.3 - 17.7 g/dL    Hematocrit 31.5 (L) 40.0 - 53.0 %    MCV 91 78 - 100 fl    MCH 30.4 26.5 - 33.0 pg    MCHC 33.3 31.5 - 36.5 g/dL    RDW 17.2 (H) 10.0 - 15.0 %    Platelet Count 351 150 - 450 10e9/L    Diff Method Automated Method     % Neutrophils 55.4 %    % Lymphocytes 26.5 %    % Monocytes 16.8 %    % Eosinophils 0.0 %    % Basophils 0.9 %    % Immature Granulocytes 0.4 %    Nucleated RBCs 0 0 /100    Absolute Neutrophil 1.3 (L) 1.6 - 8.3 10e9/L    Absolute Lymphocytes 0.6 (L) 0.8 - 5.3 10e9/L    Absolute Monocytes 0.4 0.0 - 1.3 10e9/L    Absolute Eosinophils 0.0 0.0 - 0.7 10e9/L    Absolute Basophils 0.0 0.0 - 0.2 10e9/L    Abs Immature Granulocytes 0.0 0 - 0.4 10e9/L    Absolute Nucleated RBC 0.0    Comprehensive metabolic panel     Status: Abnormal   Result Value Ref Range    Sodium 141 133 - 144 mmol/L    Potassium 4.2 3.4 - 5.3 mmol/L    Chloride 109 94 - 109 mmol/L    Carbon Dioxide 28 20 - 32 mmol/L    Anion Gap 4 3 - 14 mmol/L    Glucose 97 70 - 99 mg/dL    Urea Nitrogen 14 7 - 30 mg/dL    Creatinine 0.73 0.66 - 1.25 mg/dL    GFR Estimate >90 >60 mL/min/[1.73_m2]    GFR Estimate If Black >90 >60 mL/min/[1.73_m2]    Calcium 8.1 (L) 8.5 - 10.1 mg/dL    Bilirubin Total 0.3 0.2 - 1.3 mg/dL    Albumin 4.0 3.4 - 5.0 g/dL    Protein Total 6.6 (L) 6.8 - 8.8 g/dL    Alkaline Phosphatase 70 40 - 150 U/L    ALT 21 0 - 70 U/L     AST 9 0 - 45 U/L       Assessment:  Lazaro Lund is a 19 year old young man with T cell lymphoblastic lymphoma (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  He is being treated per COG protocol [id number].   He's had a CRu following Induction with a CR at the end of Consolidation. His course has been complicated by extensive bilateral DVT and severe necrotizing pancreatitis.  He completed treatment with lovenox. Recent abdominal CT shows continued improvement in regards to the sequelae from his pancreatitis.  Asparaginase will be permanently discontinued from his treatment regimen. He is on Vit D for deficiency.  He comes to clinic today to start Maintenance therapy.  He is doing well overall.  Blood counts are adequate to proceed. He is doing well with smoking cessation.      Plan:   1) Reviewed labs with Lazaro, will start Maintenance therapy.  2) Continue to monitor for new symptoms of thrombosis while off of lovenox. Low threshold to repeat U/S if any signs of thrombus.   3) Lazaro will have a repeat CT today per Dr Coronel. His last note indicates he would like to follow him with imaging until he has complete resolution. Will coordinate this with his next visit.  4) Continue Vit D 3 2000 IU daily (last level was 19 on 3/5/20).  Recheck level in August.  5) Day 29 Induction LP deferred, plan to make-up dose in 5th cycle of Maintenance therapy.  6) TPMT and NUDT15 genotype is normal, plan for regular dose thiopurines.  7) Given significant spinal headache history will plan for IV caffeine following LPs in the future.   8) Lazaro is doing well with smoking cessation, continue to follow.  9) Reviewed Maintenance calendar and medications with Lazaro in detail.  Included dosing, frequency and administration.  Also discussed our target ANC and the importance of taking his chemotherapy medications regularly without missed doses.   10)  RTC in 3 weeks for D29, this will be one week early in order to be in line with Dr Fischer's  schedule.  Meds filled today for 21 day supply so he should be able to fill again at next visit.      YOHAN Dunn CNP

## 2020-04-23 NOTE — ANESTHESIA CARE TRANSFER NOTE
Patient: Lazaro Lund    Procedure(s):  Lumbar puncture with intrathecal Chemotherapy (CD)    Diagnosis: Lymphoma (H) [C85.90]  Diagnosis Additional Information: No value filed.    Anesthesia Type:   General     Note:  Airway :Nasal Cannula  Patient transferred to: Recovery  Comments: Transfer to patient room for recovery.  Monitors placed.  VSS noted.  Report to RN.  Handoff Report: Identifed the Patient, Identified the Reponsible Provider, Reviewed the pertinent medical history, Discussed the surgical course, Reviewed Intra-OP anesthesia mangement and issues during anesthesia, Set expectations for post-procedure period and Allowed opportunity for questions and acknowledgement of understanding      Vitals: (Last set prior to Anesthesia Care Transfer)    CRNA VITALS  4/23/2020 0859 - 4/23/2020 0936      4/23/2020             NIBP:  100/44    Pulse:  77    Temp:  36.9  C (98.4  F)    SpO2:  100 %    Resp Rate (observed):  11    EKG:  Sinus rhythm                Electronically Signed By: YOHAN JUAREZ CRNA  April 23, 2020  9:36 AM

## 2020-04-23 NOTE — NURSING NOTE
"No chief complaint on file.    /57 (BP Location: Right arm, Patient Position: Sitting, Cuff Size: Adult Large)   Pulse 70   Temp 99.3  F (37.4  C) (Oral)   Resp 20   Ht 1.842 m (6' 0.52\")   Wt 87.4 kg (192 lb 10.9 oz)   SpO2 100%   BMI 25.76 kg/m      No Pain (0)  Data Unavailable    I have reviewed the patients medications and allergies    Height/weight double check needed? No    Efrem Montejo LPN  April 23, 2020    "

## 2020-05-05 DIAGNOSIS — Z11.59 ENCOUNTER FOR SCREENING FOR OTHER VIRAL DISEASES: Primary | ICD-10-CM

## 2020-05-13 ENCOUNTER — TELEPHONE (OUTPATIENT)
Dept: PEDIATRIC HEMATOLOGY/ONCOLOGY | Facility: CLINIC | Age: 20
End: 2020-05-13

## 2020-05-13 ENCOUNTER — ANESTHESIA EVENT (OUTPATIENT)
Dept: PEDIATRICS | Facility: CLINIC | Age: 20
End: 2020-05-13
Payer: COMMERCIAL

## 2020-05-13 NOTE — TELEPHONE ENCOUNTER
I tried calling the patient about completing their wellness screening for their future appointment. There was no answer, so I left a message for them to call us back.     Efrem Montejo LPN

## 2020-05-14 ENCOUNTER — INFUSION THERAPY VISIT (OUTPATIENT)
Dept: INFUSION THERAPY | Facility: CLINIC | Age: 20
End: 2020-05-14
Attending: PEDIATRICS
Payer: COMMERCIAL

## 2020-05-14 ENCOUNTER — HOSPITAL ENCOUNTER (OUTPATIENT)
Facility: CLINIC | Age: 20
Discharge: HOME | End: 2020-05-14
Attending: PEDIATRICS | Admitting: PEDIATRICS
Payer: COMMERCIAL

## 2020-05-14 ENCOUNTER — OFFICE VISIT (OUTPATIENT)
Dept: PEDIATRIC HEMATOLOGY/ONCOLOGY | Facility: CLINIC | Age: 20
End: 2020-05-14
Attending: PEDIATRICS
Payer: COMMERCIAL

## 2020-05-14 ENCOUNTER — ANESTHESIA (OUTPATIENT)
Dept: PEDIATRICS | Facility: CLINIC | Age: 20
End: 2020-05-14
Payer: COMMERCIAL

## 2020-05-14 VITALS
OXYGEN SATURATION: 98 % | RESPIRATION RATE: 18 BRPM | SYSTOLIC BLOOD PRESSURE: 114 MMHG | HEART RATE: 82 BPM | DIASTOLIC BLOOD PRESSURE: 59 MMHG | TEMPERATURE: 98 F

## 2020-05-14 VITALS
WEIGHT: 184.97 LBS | BODY MASS INDEX: 24.51 KG/M2 | OXYGEN SATURATION: 95 % | RESPIRATION RATE: 12 BRPM | SYSTOLIC BLOOD PRESSURE: 95 MMHG | TEMPERATURE: 98.2 F | HEART RATE: 95 BPM | HEIGHT: 73 IN | DIASTOLIC BLOOD PRESSURE: 41 MMHG

## 2020-05-14 DIAGNOSIS — Z11.59 ENCOUNTER FOR SCREENING FOR OTHER VIRAL DISEASES: Primary | ICD-10-CM

## 2020-05-14 DIAGNOSIS — C83.50 T LYMPHOBLASTIC LYMPHOMA (H): Primary | ICD-10-CM

## 2020-05-14 LAB
ALBUMIN SERPL-MCNC: 4.2 G/DL (ref 3.4–5)
ALP SERPL-CCNC: 55 U/L (ref 40–150)
ALT SERPL W P-5'-P-CCNC: 70 U/L (ref 0–70)
AMYLASE SERPL-CCNC: 24 U/L (ref 30–110)
ANION GAP SERPL CALCULATED.3IONS-SCNC: 4 MMOL/L (ref 3–14)
AST SERPL W P-5'-P-CCNC: 12 U/L (ref 0–45)
BASOPHILS # BLD AUTO: 0 10E9/L (ref 0–0.2)
BASOPHILS NFR BLD AUTO: 1 %
BILIRUB SERPL-MCNC: 1.1 MG/DL (ref 0.2–1.3)
BUN SERPL-MCNC: 13 MG/DL (ref 7–30)
CALCIUM SERPL-MCNC: 9 MG/DL (ref 8.5–10.1)
CHLORIDE SERPL-SCNC: 107 MMOL/L (ref 94–109)
CO2 SERPL-SCNC: 29 MMOL/L (ref 20–32)
CREAT SERPL-MCNC: 0.67 MG/DL (ref 0.66–1.25)
DIFFERENTIAL METHOD BLD: ABNORMAL
EOSINOPHIL # BLD AUTO: 0.2 10E9/L (ref 0–0.7)
EOSINOPHIL NFR BLD AUTO: 5.1 %
ERYTHROCYTE [DISTWIDTH] IN BLOOD BY AUTOMATED COUNT: 17.6 % (ref 10–15)
GFR SERPL CREATININE-BSD FRML MDRD: >90 ML/MIN/{1.73_M2}
GLUCOSE SERPL-MCNC: 111 MG/DL (ref 70–99)
HCT VFR BLD AUTO: 33.8 % (ref 40–53)
HGB BLD-MCNC: 11.4 G/DL (ref 13.3–17.7)
IMM GRANULOCYTES # BLD: 0 10E9/L (ref 0–0.4)
IMM GRANULOCYTES NFR BLD: 0.3 %
LIPASE SERPL-CCNC: 21 U/L (ref 73–393)
LYMPHOCYTES # BLD AUTO: 0.6 10E9/L (ref 0.8–5.3)
LYMPHOCYTES NFR BLD AUTO: 20.3 %
MCH RBC QN AUTO: 32.2 PG (ref 26.5–33)
MCHC RBC AUTO-ENTMCNC: 33.7 G/DL (ref 31.5–36.5)
MCV RBC AUTO: 96 FL (ref 78–100)
MONOCYTES # BLD AUTO: 0.1 10E9/L (ref 0–1.3)
MONOCYTES NFR BLD AUTO: 4.7 %
NEUTROPHILS # BLD AUTO: 2 10E9/L (ref 1.6–8.3)
NEUTROPHILS NFR BLD AUTO: 68.6 %
NRBC # BLD AUTO: 0 10*3/UL
NRBC BLD AUTO-RTO: 0 /100
PLATELET # BLD AUTO: 245 10E9/L (ref 150–450)
POTASSIUM SERPL-SCNC: 3.8 MMOL/L (ref 3.4–5.3)
PROT SERPL-MCNC: 6.7 G/DL (ref 6.8–8.8)
RBC # BLD AUTO: 3.54 10E12/L (ref 4.4–5.9)
SODIUM SERPL-SCNC: 140 MMOL/L (ref 133–144)
WBC # BLD AUTO: 3 10E9/L (ref 4–11)

## 2020-05-14 PROCEDURE — 82150 ASSAY OF AMYLASE: CPT | Performed by: NURSE PRACTITIONER

## 2020-05-14 PROCEDURE — 37000008 ZZH ANESTHESIA TECHNICAL FEE, 1ST 30 MIN: Performed by: PEDIATRICS

## 2020-05-14 PROCEDURE — 80053 COMPREHEN METABOLIC PANEL: CPT | Performed by: NURSE PRACTITIONER

## 2020-05-14 PROCEDURE — 80299 QUANTITATIVE ASSAY DRUG: CPT | Performed by: NURSE PRACTITIONER

## 2020-05-14 PROCEDURE — 25000128 H RX IP 250 OP 636: Performed by: PEDIATRICS

## 2020-05-14 PROCEDURE — 25000128 H RX IP 250 OP 636: Performed by: NURSE ANESTHETIST, CERTIFIED REGISTERED

## 2020-05-14 PROCEDURE — 40000165 ZZH STATISTIC POST-PROCEDURE RECOVERY CARE: Performed by: PEDIATRICS

## 2020-05-14 PROCEDURE — 25000128 H RX IP 250 OP 636: Mod: ZF

## 2020-05-14 PROCEDURE — 25000125 ZZHC RX 250: Performed by: PEDIATRICS

## 2020-05-14 PROCEDURE — 83690 ASSAY OF LIPASE: CPT | Performed by: NURSE PRACTITIONER

## 2020-05-14 PROCEDURE — 25000128 H RX IP 250 OP 636

## 2020-05-14 PROCEDURE — 96367 TX/PROPH/DG ADDL SEQ IV INF: CPT | Mod: 59 | Performed by: PEDIATRICS

## 2020-05-14 PROCEDURE — 25000128 H RX IP 250 OP 636: Mod: ZF | Performed by: PEDIATRICS

## 2020-05-14 PROCEDURE — 25800030 ZZH RX IP 258 OP 636: Performed by: PEDIATRICS

## 2020-05-14 PROCEDURE — 25800030 ZZH RX IP 258 OP 636: Performed by: NURSE ANESTHETIST, CERTIFIED REGISTERED

## 2020-05-14 PROCEDURE — 96409 CHEMO IV PUSH SNGL DRUG: CPT

## 2020-05-14 PROCEDURE — 25800030 ZZH RX IP 258 OP 636: Mod: ZF | Performed by: PEDIATRICS

## 2020-05-14 PROCEDURE — 85025 COMPLETE CBC W/AUTO DIFF WBC: CPT | Performed by: PEDIATRICS

## 2020-05-14 PROCEDURE — 89050 BODY FLUID CELL COUNT: CPT | Performed by: PEDIATRICS

## 2020-05-14 PROCEDURE — 40001011 ZZH STATISTIC PRE-PROCEDURE NURSING ASSESSMENT: Performed by: PEDIATRICS

## 2020-05-14 PROCEDURE — 96450 CHEMOTHERAPY INTO CNS: CPT | Performed by: PEDIATRICS

## 2020-05-14 PROCEDURE — 96365 THER/PROPH/DIAG IV INF INIT: CPT | Performed by: PEDIATRICS

## 2020-05-14 PROCEDURE — 25000125 ZZHC RX 250: Performed by: NURSE ANESTHETIST, CERTIFIED REGISTERED

## 2020-05-14 PROCEDURE — 96375 TX/PRO/DX INJ NEW DRUG ADDON: CPT

## 2020-05-14 RX ORDER — DIPHENHYDRAMINE HYDROCHLORIDE 50 MG/ML
INJECTION INTRAMUSCULAR; INTRAVENOUS
Status: COMPLETED
Start: 2020-05-14 | End: 2020-05-14

## 2020-05-14 RX ORDER — LIDOCAINE 40 MG/G
CREAM TOPICAL
Status: DISCONTINUED
Start: 2020-05-14 | End: 2020-05-14 | Stop reason: HOSPADM

## 2020-05-14 RX ORDER — ALBUTEROL SULFATE 0.83 MG/ML
2.5 SOLUTION RESPIRATORY (INHALATION)
Status: DISCONTINUED | OUTPATIENT
Start: 2020-05-14 | End: 2020-05-14 | Stop reason: HOSPADM

## 2020-05-14 RX ORDER — ONDANSETRON 2 MG/ML
INJECTION INTRAMUSCULAR; INTRAVENOUS
Status: COMPLETED
Start: 2020-05-14 | End: 2020-05-14

## 2020-05-14 RX ORDER — KETAMINE HYDROCHLORIDE 10 MG/ML
INJECTION INTRAMUSCULAR; INTRAVENOUS PRN
Status: DISCONTINUED | OUTPATIENT
Start: 2020-05-14 | End: 2020-05-14

## 2020-05-14 RX ORDER — HEPARIN SODIUM (PORCINE) LOCK FLUSH IV SOLN 100 UNIT/ML 100 UNIT/ML
5 SOLUTION INTRAVENOUS
Status: DISCONTINUED | OUTPATIENT
Start: 2020-05-14 | End: 2020-05-14 | Stop reason: HOSPADM

## 2020-05-14 RX ORDER — SCOLOPAMINE TRANSDERMAL SYSTEM 1 MG/1
1 PATCH, EXTENDED RELEASE TRANSDERMAL
Qty: 10 PATCH | Refills: 3 | Status: ON HOLD | OUTPATIENT
Start: 2020-05-14 | End: 2020-10-06

## 2020-05-14 RX ORDER — SODIUM CHLORIDE, SODIUM LACTATE, POTASSIUM CHLORIDE, CALCIUM CHLORIDE 600; 310; 30; 20 MG/100ML; MG/100ML; MG/100ML; MG/100ML
INJECTION, SOLUTION INTRAVENOUS CONTINUOUS PRN
Status: DISCONTINUED | OUTPATIENT
Start: 2020-05-14 | End: 2020-05-14

## 2020-05-14 RX ORDER — DIPHENHYDRAMINE HYDROCHLORIDE 50 MG/ML
25-50 INJECTION INTRAMUSCULAR; INTRAVENOUS ONCE
Status: COMPLETED | OUTPATIENT
Start: 2020-05-14 | End: 2020-05-14

## 2020-05-14 RX ORDER — SODIUM CHLORIDE, SODIUM LACTATE, POTASSIUM CHLORIDE, CALCIUM CHLORIDE 600; 310; 30; 20 MG/100ML; MG/100ML; MG/100ML; MG/100ML
INJECTION, SOLUTION INTRAVENOUS CONTINUOUS
Status: DISCONTINUED | OUTPATIENT
Start: 2020-05-14 | End: 2020-05-14 | Stop reason: HOSPADM

## 2020-05-14 RX ORDER — HEPARIN SODIUM,PORCINE 10 UNIT/ML
VIAL (ML) INTRAVENOUS
Status: DISCONTINUED
Start: 2020-05-14 | End: 2020-05-14 | Stop reason: HOSPADM

## 2020-05-14 RX ORDER — FENTANYL CITRATE 50 UG/ML
INJECTION, SOLUTION INTRAMUSCULAR; INTRAVENOUS PRN
Status: DISCONTINUED | OUTPATIENT
Start: 2020-05-14 | End: 2020-05-14

## 2020-05-14 RX ORDER — CAFFEINE AND SODIUM BENZOATE 125 MG/ML
500 INJECTION, SOLUTION INTRAMUSCULAR; INTRAVENOUS ONCE
Status: DISCONTINUED | OUTPATIENT
Start: 2020-05-14 | End: 2020-05-14

## 2020-05-14 RX ORDER — LIDOCAINE 40 MG/G
2.5 CREAM TOPICAL
Status: DISCONTINUED | OUTPATIENT
Start: 2020-05-14 | End: 2020-05-14 | Stop reason: HOSPADM

## 2020-05-14 RX ORDER — HEPARIN SODIUM (PORCINE) LOCK FLUSH IV SOLN 100 UNIT/ML 100 UNIT/ML
SOLUTION INTRAVENOUS
Status: DISCONTINUED
Start: 2020-05-14 | End: 2020-05-14 | Stop reason: WASHOUT

## 2020-05-14 RX ORDER — HEPARIN SODIUM,PORCINE 10 UNIT/ML
5 VIAL (ML) INTRAVENOUS ONCE
Status: DISCONTINUED | OUTPATIENT
Start: 2020-05-14 | End: 2020-05-14 | Stop reason: HOSPADM

## 2020-05-14 RX ORDER — HEPARIN SODIUM (PORCINE) LOCK FLUSH IV SOLN 100 UNIT/ML 100 UNIT/ML
SOLUTION INTRAVENOUS
Status: COMPLETED
Start: 2020-05-14 | End: 2020-05-14

## 2020-05-14 RX ADMIN — CAFFEINE AND SODIUM BENZOATE 500 MG: 125 INJECTION, SOLUTION INTRAMUSCULAR; INTRAVENOUS at 11:02

## 2020-05-14 RX ADMIN — METHOTREXATE: 25 INJECTION INTRA-ARTERIAL; INTRAMUSCULAR; INTRATHECAL; INTRAVENOUS at 10:52

## 2020-05-14 RX ADMIN — SODIUM CHLORIDE, POTASSIUM CHLORIDE, SODIUM LACTATE AND CALCIUM CHLORIDE: 600; 310; 30; 20 INJECTION, SOLUTION INTRAVENOUS at 10:35

## 2020-05-14 RX ADMIN — DEXMEDETOMIDINE HYDROCHLORIDE 20 MCG: 100 INJECTION, SOLUTION INTRAVENOUS at 10:39

## 2020-05-14 RX ADMIN — FENTANYL CITRATE 50 MCG: 50 INJECTION, SOLUTION INTRAMUSCULAR; INTRAVENOUS at 10:39

## 2020-05-14 RX ADMIN — VINCRISTINE SULFATE 2 MG: 1 INJECTION, SOLUTION INTRAVENOUS at 12:54

## 2020-05-14 RX ADMIN — ONDANSETRON 4 MG: 2 INJECTION INTRAMUSCULAR; INTRAVENOUS at 10:03

## 2020-05-14 RX ADMIN — MIDAZOLAM 4 MG: 1 INJECTION INTRAMUSCULAR; INTRAVENOUS at 10:39

## 2020-05-14 RX ADMIN — Medication 20 MG: at 10:41

## 2020-05-14 RX ADMIN — HEPARIN 500 UNITS: 100 SYRINGE at 13:04

## 2020-05-14 RX ADMIN — DIPHENHYDRAMINE HYDROCHLORIDE 50 MG: 50 INJECTION INTRAMUSCULAR; INTRAVENOUS at 12:36

## 2020-05-14 RX ADMIN — DIPHENHYDRAMINE HYDROCHLORIDE 50 MG: 50 INJECTION, SOLUTION INTRAMUSCULAR; INTRAVENOUS at 12:36

## 2020-05-14 RX ADMIN — HEPARIN SODIUM (PORCINE) LOCK FLUSH IV SOLN 100 UNIT/ML 500 UNITS: 100 SOLUTION at 13:04

## 2020-05-14 RX ADMIN — SODIUM CHLORIDE 50 ML: 9 INJECTION, SOLUTION INTRAVENOUS at 12:54

## 2020-05-14 ASSESSMENT — MIFFLIN-ST. JEOR: SCORE: 1885.87

## 2020-05-14 NOTE — DISCHARGE INSTRUCTIONS
Home Instructions for Your Child after Sedation  Today your child received (medicine):  Fentanyl, Versed, Ketamine, Precedex, Zofran and Caffeine  Please keep this form with your health records  Your child may be more sleepy and irritable today than normal. Wake your child up every 1 to 11/2 hours during the day. (This way, both you and your child will sleep through the night.) Also, an adult should stay with your child for the rest of the day. The medicine may make the child dizzy. Avoid activities that require balance (bike riding, skating, climbing stairs, walking).  Remember:    For young infants: Do not allow the car seat or infant seat to bend the child's head forward and down. If it does, your child may not be able to breathe.    When your child wants to eat again, start with liquids (juice, soda pop, Popsicles). If your child feels well enough, you may try a regular diet. It is best to offer light meals for the first 24 hours.    If your child has nausea (feels sick to the stomach) or vomiting (throws up), give small amounts of clear liquids (7-Up, Sprite, apple juice or broth). Fluids are more important than food until your child is feeling better.    Wait 24 hours before giving medicine that contains alcohol. This includes liquid cold, cough and allergy medicines (Robitussin, Vicks Formula 44 for children, Benadryl, Chlor-Trimeton).    If you will leave your child with a , give the sitter a copy of these instructions.  Call your doctor if:    You have questions about the test results.    Your child vomits (throws up) more than two times.    Your child is very fussy or irritable.    You have trouble waking your child.     If your child has trouble breathing, call [phone].  If you have any questions or concerns, please call:  Pediatric Sedation Unit [phone]  Pediatric clinic  [phone]  [facility]  [phone] (ask for the doctor on call)  Emergency  department [phone]  [facility] toll-free number [phone] (Monday--Friday, 8 a.m. to 4:30 p.m.)  I understand these instructions. I have all of my personal belongings.      [facility]   [phone]    Care post Lumbar Puncture     Do not remove bandage/dressing for 24 hours -- after this time they can be removed    No bath, shower or soaking of the dressing for 24 hours    Activity as tolerated by the patient    Diet as able to tolerated    May use Tylenol as needed for pain control -- DO NOT use Ibuprofen    Can apply icepack to the site for discomfort -- no more than 10 minutes at a time    If bleeding presents apply pressure for 5 minutes    Call [phone] ask for Peds BMT/Hem/Onc fellow on call if complications arise including:    persistent bleeding    fever greater than 100.5    Pain    Lumbar punctures can cause headache. If the pain is not controlled with Tylenol (acetaminophen) please call the Peds BMT/Hem/Onc fellow on call

## 2020-05-14 NOTE — PROGRESS NOTES
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 Lazaro presented with acute onset cough and was found to have an anterior mediastinal mass and malignant left-sided pleural effusion.  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  He was admitted to [facility] oncology service 8/30 and started on treatment per [id number].  He had a pleural effusion that required a chest tube and a pericardial effusion that was not drained. His Induction was complicated by cardiac compression secondary to his mass leading to tamponade, this improved through out his hospital stay.  He also had dysphagia that improved with treatment of his mass, swallow study was normal. His course was recently complicated by extensive bilateral UE DVTs, he remains on anticoagulation.  Most recently his course was complicated by the development of severe asparaginase induced pancreatitis. He became quite ill with SIRS and associated hypotension, he required pressor support in the ICU.  Fortunately Lazaro recovered well and his subsequent imaging has continued to show improvement.  He comes to clinic today for Day 29 of his 1st Maintenance cycle.    HPI:   Lazaro comes to clinic today by himself.  He reports nausea for the past few weeks.  He notes his nausea is the most intense when he wakes in the morning and it is hard for him to eat.  He generally will smoke marijuana with relief and then be better after that. He has tried zofran but didn't feel it was helpful.  He has not had frequent vomiting but will occasionally vomit.  He reports his stomach feels bubbly and sometimes the nausea comes out of nowhere.  His appetite is down.  He denies any abdominal pain.  He is passing stool without difficulty, no diarrhea.  No skin concerns.  He denies pain.  He is sleeping well at night, at leas 8-9 hours now and sometimes takes  a nap as he feels more tired than usual. No fever.  No paresthesias.  Mood is good.    Lazaro has done well with cigarette smoking cessation.  He is no longer using patches.  His mom is living elsewhere to avoid exposure to his grandma and he feels this has been helpful since his mom is a smoker and being around it would tempt him.    Review of systems:  Pertinent positives reported in the HPI. All other systems in a complete and comprehensive review of systems were negative..    PMH:   Past Medical History:   Diagnosis Date     Acute necrotizing pancreatitis 11/07/2019    attributed to asparaginase     Acute pancreatitis due to PEGaspariginase therapy  11/15/2019     DVT of upper extremity (deep vein thrombosis) (H) 09/26/2019    Bilateral      Edema of upper extremity 10/17/2019     Folliculitis 10/17/2019     Migraine 2006    have resolved     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   -- Spinal HA following LP  -- TPMT shows Intermediate activity with normal TPMT/NUDT15 genotype  -- Factor V leiden and prothrombin gene mutation negative  -- Necrotizing pancreatitis secondary to asparaginase    PFMH:   Family History   Problem Relation Age of Onset     No Known Problems Mother      No Known Problems Father      Asthma Brother      Thyroid Cancer Paternal Grandmother      Melanoma Paternal Aunt      Social History: Lazaro previously lived at home with his dad and step mom in [place] but is now staying with his grandma in [place].  He has stopped working since [employer] restriction went into place.    Current Medications:  Current Outpatient Medications on File Prior to Visit   Medication Sig Dispense Refill     diphenhydrAMINE (BENADRYL) 25 MG capsule Take 1-2 capsules (25-50 mg) by mouth every 6 hours as needed (Breakthrough Nausea and Vomiting ) 30 capsule 1     lidocaine-prilocaine (EMLA) 2.5-2.5 % external cream Apply topically as needed for other (prior to port access) 30 g 6     mercaptopurine (PURINETHOL)  "50 MG tablet Take 3 tablets (150mg) four days per week and 3.5 tablets (175mg) three days/week. 96 tablet 2     methotrexate 2.5 MG tablet Take 17 tablets (42.5 mg) by mouth once a week Do not take on Days of LP. 68 tablet 2     ondansetron (ZOFRAN-ODT) 8 MG ODT tab Take 1 tablet (8 mg) by mouth every 8 hours as needed for nausea 20 tablet 6     pantoprazole (PROTONIX) 40 MG EC tablet Take 1 tablet (40 mg) by mouth every morning (before breakfast) 30 tablet 2     polyethylene glycol (MIRALAX/GLYCOLAX) packet Take 17 g by mouth daily 30 packet 0     predniSONE (DELTASONE) 10 MG tablet Take 45mg (4.5 tabs) in the AM and 40mg (4 tabs) in the PM for 5 days 43 tablet 2     scopolamine (TRANSDERM) 1 MG/3DAYS 72 hr patch Place 1 patch onto the skin every 72 hours 10 patch 3     sennosides (SENOKOT) 8.6 MG tablet Take 2 tablets by mouth 2 times daily as needed for constipation 60 each 1     sulfamethoxazole-trimethoprim (BACTRIM DS) 800-160 MG tablet Take 1 tablet by mouth Every Mon, Tues two times daily 16 tablet 11     Vitamin D, Cholecalciferol, 25 MCG (1000 UT) TABS Take 2 tablets (2,000 Units) by mouth daily 60 tablet 3       Reviewed medications with Lazaro, he has not missed any doses of chemotherapy.    Received influenza vaccine for the 3556-9307 season.      Physical Exam:   Temp:  [98.2  F (36.8  C)] 98.2  F (36.8  C)  Pulse:  [86] 86  Resp:  [20] 20  BP: (127)/(71) 127/71  SpO2:  [100 %] 100 %     Wt Readings from Last 4 Encounters:   05/14/20 83.9 kg (184 lb 15.5 oz)   04/23/20 87.4 kg (192 lb 10.9 oz)   04/23/20 87.4 kg (192 lb 10.9 oz)   04/16/20 86.6 kg (190 lb 14.7 oz)     Ht Readings from Last 2 Encounters:   05/14/20 1.843 m (6' 0.56\")   04/23/20 1.842 m (6' 0.52\")     Lazaro is now above his baseline weight of 180 lbs.  General: Lazaro is alert, interactive and appropriate. He is nauseous at first but responds well to zofran.   HEENT: Skull is atrauamatic and normocephalic.  Full head of hair. KEENAN, " sclera are non icteric and not injected, EOM are intact, gaze slightly disconjugate which is his baseline. Nares are patent without drainage. Oropharynx clear, no plaques noted. Buccal mucosa and tongue clear. MMM.  Neck:  Supple without lymphadenopathy.   Lymph: There is no cervical, supraclavicular, axillary, lymphadenopathy palpated.  Cardiovascular:  HR is regular, S1, S2 no murmur.  Capillary refill is < 2 seconds. Right arm without edema or erythema.  No pitting edema.  Cap refill < 2 sec. Peripheral pulses 2+/=. He has mildly distended veins noted on the left upper chest, not extending up to the neck, overlying the pectoralis.   Respiratory: No cough noted. Respirations are easy.  Lungs are clear to auscultation throughout.  No crackles or wheezes.  Gastrointestinal:  BS present in all quadrants.  Abdomen is soft and flat.  Lazaro denies LUQ discomfort, no pain with palpation. No hepatosplenomegaly or masses are palpated.  Skin: No skin lesions noted.  Neurological:  CN 2-12 grossly intact. Gait is normal.  No issues with balance. Sensation intact in hands and feet.     Musculoskeletal:  Good strength and ROM in all extremities.  Strong dorsiflexion at ankles and great toes (5/5) bilaterally without any pain at the Achilles.    Labs:   Results for orders placed or performed during the hospital encounter of 05/14/20   CBC with platelets differential     Status: Abnormal   Result Value Ref Range    WBC 3.0 (L) 4.0 - 11.0 10e9/L    RBC Count 3.54 (L) 4.4 - 5.9 10e12/L    Hemoglobin 11.4 (L) 13.3 - 17.7 g/dL    Hematocrit 33.8 (L) 40.0 - 53.0 %    MCV 96 78 - 100 fl    MCH 32.2 26.5 - 33.0 pg    MCHC 33.7 31.5 - 36.5 g/dL    RDW 17.6 (H) 10.0 - 15.0 %    Platelet Count 245 150 - 450 10e9/L    Diff Method Automated Method     % Neutrophils 68.6 %    % Lymphocytes 20.3 %    % Monocytes 4.7 %    % Eosinophils 5.1 %    % Basophils 1.0 %    % Immature Granulocytes 0.3 %    Nucleated RBCs 0 0 /100    Absolute  Neutrophil 2.0 1.6 - 8.3 10e9/L    Absolute Lymphocytes 0.6 (L) 0.8 - 5.3 10e9/L    Absolute Monocytes 0.1 0.0 - 1.3 10e9/L    Absolute Eosinophils 0.2 0.0 - 0.7 10e9/L    Absolute Basophils 0.0 0.0 - 0.2 10e9/L    Abs Immature Granulocytes 0.0 0 - 0.4 10e9/L    Absolute Nucleated RBC 0.0    Comprehensive metabolic panel     Status: Abnormal   Result Value Ref Range    Sodium 140 133 - 144 mmol/L    Potassium 3.8 3.4 - 5.3 mmol/L    Chloride 107 94 - 109 mmol/L    Carbon Dioxide 29 20 - 32 mmol/L    Anion Gap 4 3 - 14 mmol/L    Glucose 111 (H) 70 - 99 mg/dL    Urea Nitrogen 13 7 - 30 mg/dL    Creatinine 0.67 0.66 - 1.25 mg/dL    GFR Estimate >90 >60 mL/min/[1.73_m2]    GFR Estimate If Black >90 >60 mL/min/[1.73_m2]    Calcium 9.0 8.5 - 10.1 mg/dL    Bilirubin Total 1.1 0.2 - 1.3 mg/dL    Albumin 4.2 3.4 - 5.0 g/dL    Protein Total 6.7 (L) 6.8 - 8.8 g/dL    Alkaline Phosphatase 55 40 - 150 U/L    ALT 70 0 - 70 U/L    AST 12 0 - 45 U/L   Amylase     Status: Abnormal   Result Value Ref Range    Amylase 24 (L) 30 - 110 U/L   Lipase     Status: Abnormal   Result Value Ref Range    Lipase 21 (L) 73 - 393 U/L   Cell count with differential CSF:     Status: None   Result Value Ref Range    WBC CSF 1 0 - 5 /uL    RBC CSF 1 0 - 2 /uL    Tube Number 2 #    Color CSF Colorless CLRL^Colorless    Appearance CSF Clear CLER^Clear     Assessment:  Lazaro Lund is a 19 year old young man with T cell lymphoblastic lymphoma (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  He is being treated per [facility] protocol [id number].   He's had a CRu following Induction with a CR at the end of Consolidation. His course has been complicated by extensive bilateral DVT and severe necrotizing pancreatitis.  He completed treatment with lovenox. Recent abdominal CT shows continued improvement in regards to the sequelae from his pancreatitis.  Asparaginase was permanently discontinued from his treatment regimen. He is on Vit D for deficiency.  He comes to clinic today for Day  29 of Cycle 1 of Maintenance therapy.  He's had increased nausea since starting Maintenance therapy.  No abdominal pain.  LFTs and pancreatic enzymes look good.  His blood counts look good as well, ANC above target range.  His weight has dropped about 8lbs in the past 3 weeks.    Plan:   1) Reviewed labs with Lazaro, continue current doses of chemo and start prednisone burst.  2) Discussed nausea at length, it is reassuring that his CMP and pancreatic enzymes looks good.  I would like him to restart his protonix and take regularly (he is currently just taking a few times per week).  Also recommend taking zofran daily at bedtime and restarting scop patch.  I sent TPMT metabolites and it will be interesting to see if he has an elevated 6MMP.  I will follow up with Lazaro when that is back.  In the meantime, if symptoms don't improve or he continues to lose weight I've asked him to call.  3) Continue to monitor for new symptoms of thrombosis while off of lovenox. Low threshold to repeat U/S if any signs of thrombus.   4) Lazaro's most recent CT ordered by Dr Coronel looks good and he not longer requires additional imaging or follow up with him.  5) Continue Vit D 3 2000 IU daily (last level was 19 on 3/5/20).  Recheck level in August.  6) Day 29 Induction LP deferred, plan to make-up dose in 5th cycle of Maintenance therapy.  7) Given significant spinal headache history will plan for IV caffeine following LPs in the future.   8) Lazaro is doing well with smoking cessation, continue to follow. Have addressed marijuana use and associated risks.  9)  RTC in 1 month for D57 of Cycle 1, sooner if nausea doesn't improve.

## 2020-05-14 NOTE — ANESTHESIA CARE TRANSFER NOTE
Patient: Lazaro Lund    Procedure(s):  Lumbar puncture with intrathecal Chemotherapy (not CD)    Diagnosis: Lymphoma (H) [C85.90]  Diagnosis Additional Information: No value filed.    Anesthesia Type:   General     Note:  Airway :Nasal Cannula  Patient transferred to: Recovery  Comments: Transfer to patient room for recovery.  Monitors placed.  VSS noted.  Report to RN.  Handoff Report: Identifed the Patient, Identified the Reponsible Provider, Reviewed the pertinent medical history, Discussed the surgical course, Reviewed Intra-OP anesthesia mangement and issues during anesthesia, Set expectations for post-procedure period and Allowed opportunity for questions and acknowledgement of understanding      Vitals: (Last set prior to Anesthesia Care Transfer)    CRNA VITALS  5/14/2020 1022 - 5/14/2020 1053      5/14/2020             NIBP:  111/50    Pulse:  106    Temp:  36.4  C (97.5  F)    SpO2:  100 %    Resp Rate (observed):  18    EKG:  Sinus rhythm                Electronically Signed By: YOHAN JUAREZ CRNA  May 14, 2020  10:53 AM

## 2020-05-14 NOTE — PROGRESS NOTES
Infusion Nursing Note    Lazaro Lund Presents to [facility] today for: Vincristine after LP    Due to : T lymphoblastic lymphoma (H)    Intravenous Access/Labs: Port accessed prior to arrival to clinic in Peds sedation.     Infusion Note: Pt. Seen and assessed by Luana PENALOZA NP in Peds Sedation.  Upon arrival to clinic, pt. C/o nausea.  IV Zofran already given in Peds Sedation, IV Benadryl given here.  Vincristine given to gravity. Blood return noted pre/mid/post infusion. Port heparin locked and deaccessed.  VSS.  Pt. States feeling better prior to leaving.    Discharge Plan:   Patient verbalized understanding of discharge instructions.  Home meds sent with pt. Pt left [facility] in stable condition at conclusion of appt.

## 2020-05-14 NOTE — ANESTHESIA POSTPROCEDURE EVALUATION
Anesthesia POST Procedure Evaluation    Patient: Lazaro Lund   MRN:     [id number] Gender:   male   Age:    20 year old :      2000        Preoperative Diagnosis: Lymphoma (H) [C85.90]   Procedure(s):  Lumbar puncture with intrathecal Chemotherapy (not CD)   Postop Comments: No value filed.     Anesthesia Type: MAC       Disposition: Outpatient   Postop Pain Control: Uneventful            Sign Out: Well controlled pain   PONV: No   Neuro/Psych: Uneventful            Sign Out: Acceptable/Baseline neuro status   Airway/Respiratory: Uneventful            Sign Out: Acceptable/Baseline resp. status   CV/Hemodynamics: Uneventful            Sign Out: Acceptable CV status   Other NRE: NONE   DID A NON-ROUTINE EVENT OCCUR? No    Event details/Postop Comments:  - Uneventful, ready for discharge         Last Anesthesia Record Vitals:  CRNA VITALS  2020 1022 - 2020 1122      2020             NIBP:  103/71    Pulse:  77    NIBP Mean:  77    Temp:  36.7  C (98.1  F)    SpO2:  98 %    Resp Rate (observed):  16    EKG:  NSR          Last PACU Vitals:  Vitals Value Taken Time   BP 89/37 2020 11:15 AM   Temp 36.7  C (98.1  F) 2020 10:58 AM   Pulse 80 2020 11:15 AM   Resp 16 2020 10:58 AM   SpO2 95 % 2020 11:23 AM   Temp src     NIBP     Pulse     SpO2     Resp     Temp     Ht Rate     Temp 2     Vitals shown include unvalidated device data.      Electronically Signed By: Kyle Palomino MD, May 14, 2020, 11:24 AM

## 2020-05-14 NOTE — LETTER
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 Lazaro presented with acute onset cough and was found to have an anterior mediastinal mass and malignant left-sided pleural effusion.  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  He was admitted to [facility] oncology service 8/30 and started on treatment per [id number].  He had a pleural effusion that required a chest tube and a pericardial effusion that was not drained. His Induction was complicated by cardiac compression secondary to his mass leading to tamponade, this improved through out his hospital stay.  He also had dysphagia that improved with treatment of his mass, swallow study was normal. His course was recently complicated by extensive bilateral UE DVTs, he remains on anticoagulation.  Most recently his course was complicated by the development of severe asparaginase induced pancreatitis. He became quite ill with SIRS and associated hypotension, he required pressor support in the ICU.  Fortunately Lazaro recovered well and his subsequent imaging has continued to show improvement.  He comes to clinic today for Day 29 of his 1st Maintenance cycle.    HPI:   Lazaro comes to clinic today by himself.  He reports nausea for the past few weeks.  He notes his nausea is the most intense when he wakes in the morning and it is hard for him to eat.  He generally will smoke marijuana with relief and then be better after that. He has tried zofran but didn't feel it was helpful.  He has not had frequent vomiting but will occasionally vomit.  He reports his stomach feels bubbly and sometimes the nausea comes out of nowhere.  His appetite is down.  He denies any abdominal pain.  He is passing stool without difficulty, no diarrhea.  No skin concerns.   He denies pain.  He is sleeping well at night, at leas 8-9 hours now and sometimes takes a nap as he feels more tired than usual. No fever.  No paresthesias.  Mood is good.    Lazaro has done well with cigarette smoking cessation.  He is no longer using patches.  His mom is living elsewhere to avoid exposure to his grandma and he feels this has been helpful since his mom is a smoker and being around it would tempt him.    Review of systems:  Pertinent positives reported in the HPI. All other systems in a complete and comprehensive review of systems were negative..    PMH:   Past Medical History:   Diagnosis Date     Acute necrotizing pancreatitis 11/07/2019    attributed to asparaginase     Acute pancreatitis due to PEGaspariginase therapy  11/15/2019     DVT of upper extremity (deep vein thrombosis) (H) 09/26/2019    Bilateral      Edema of upper extremity 10/17/2019     Folliculitis 10/17/2019     Migraine 2006    have resolved     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   -- Spinal HA following LP  -- TPMT shows Intermediate activity with normal TPMT/NUDT15 genotype  -- Factor V leiden and prothrombin gene mutation negative  -- Necrotizing pancreatitis secondary to asparaginase    PFMH:   Family History   Problem Relation Age of Onset     No Known Problems Mother      No Known Problems Father      Asthma Brother      Thyroid Cancer Paternal Grandmother      Melanoma Paternal Aunt      Social History: Lazaro previously lived at home with his dad and step mom in [place] but is now staying with his grandma in [place].  He has stopped working since [employer] [place] restriction went into place.    Current Medications:  Current Outpatient Medications on File Prior to Visit   Medication Sig Dispense Refill     diphenhydrAMINE (BENADRYL) 25 MG capsule Take 1-2 capsules (25-50 mg) by mouth every 6 hours as needed (Breakthrough Nausea and Vomiting ) 30 capsule 1     lidocaine-prilocaine (EMLA) 2.5-2.5 % external cream Apply  "topically as needed for other (prior to port access) 30 g 6     mercaptopurine (PURINETHOL) 50 MG tablet Take 3 tablets (150mg) four days per week and 3.5 tablets (175mg) three days/week. 96 tablet 2     methotrexate 2.5 MG tablet Take 17 tablets (42.5 mg) by mouth once a week Do not take on Days of LP. 68 tablet 2     ondansetron (ZOFRAN-ODT) 8 MG ODT tab Take 1 tablet (8 mg) by mouth every 8 hours as needed for nausea 20 tablet 6     pantoprazole (PROTONIX) 40 MG EC tablet Take 1 tablet (40 mg) by mouth every morning (before breakfast) 30 tablet 2     polyethylene glycol (MIRALAX/GLYCOLAX) packet Take 17 g by mouth daily 30 packet 0     predniSONE (DELTASONE) 10 MG tablet Take 45mg (4.5 tabs) in the AM and 40mg (4 tabs) in the PM for 5 days 43 tablet 2     scopolamine (TRANSDERM) 1 MG/3DAYS 72 hr patch Place 1 patch onto the skin every 72 hours 10 patch 3     sennosides (SENOKOT) 8.6 MG tablet Take 2 tablets by mouth 2 times daily as needed for constipation 60 each 1     sulfamethoxazole-trimethoprim (BACTRIM DS) 800-160 MG tablet Take 1 tablet by mouth Every Mon, Tues two times daily 16 tablet 11     Vitamin D, Cholecalciferol, 25 MCG (1000 UT) TABS Take 2 tablets (2,000 Units) by mouth daily 60 tablet 3       Reviewed medications with Lazaro, he has not missed any doses of chemotherapy.    Received influenza vaccine for the 7331-0523 season.      Physical Exam:   Temp:  [98.2  F (36.8  C)] 98.2  F (36.8  C)  Pulse:  [86] 86  Resp:  [20] 20  BP: (127)/(71) 127/71  SpO2:  [100 %] 100 %     Wt Readings from Last 4 Encounters:   05/14/20 83.9 kg (184 lb 15.5 oz)   04/23/20 87.4 kg (192 lb 10.9 oz)   04/23/20 87.4 kg (192 lb 10.9 oz)   04/16/20 86.6 kg (190 lb 14.7 oz)     Ht Readings from Last 2 Encounters:   05/14/20 1.843 m (6' 0.56\")   04/23/20 1.842 m (6' 0.52\")     Lazaro is now above his baseline weight of 180 lbs.  General: Lazaro is alert, interactive and appropriate. He is nauseous at first but responds " well to zofran.   HEENT: Skull is atrauamatic and normocephalic.  Full head of hair. PERRLA, sclera are non icteric and not injected, EOM are intact, gaze slightly disconjugate which is his baseline. Nares are patent without drainage. Oropharynx clear, no plaques noted. Buccal mucosa and tongue clear. MMM.  Neck:  Supple without lymphadenopathy.   Lymph: There is no cervical, supraclavicular, axillary, lymphadenopathy palpated.  Cardiovascular:  HR is regular, S1, S2 no murmur.  Capillary refill is < 2 seconds. Right arm without edema or erythema.  No pitting edema.  Cap refill < 2 sec. Peripheral pulses 2+/=. He has mildly distended veins noted on the left upper chest, not extending up to the neck, overlying the pectoralis.   Respiratory: No cough noted. Respirations are easy.  Lungs are clear to auscultation throughout.  No crackles or wheezes.  Gastrointestinal:  BS present in all quadrants.  Abdomen is soft and flat.  Lazaro denies LUQ discomfort, no pain with palpation. No hepatosplenomegaly or masses are palpated.  Skin: No skin lesions noted.  Neurological:  CN 2-12 grossly intact. Gait is normal.  No issues with balance. Sensation intact in hands and feet.     Musculoskeletal:  Good strength and ROM in all extremities.  Strong dorsiflexion at ankles and great toes (5/5) bilaterally without any pain at the Achilles.    Labs:   Results for orders placed or performed during the hospital encounter of 05/14/20   CBC with platelets differential     Status: Abnormal   Result Value Ref Range    WBC 3.0 (L) 4.0 - 11.0 10e9/L    RBC Count 3.54 (L) 4.4 - 5.9 10e12/L    Hemoglobin 11.4 (L) 13.3 - 17.7 g/dL    Hematocrit 33.8 (L) 40.0 - 53.0 %    MCV 96 78 - 100 fl    MCH 32.2 26.5 - 33.0 pg    MCHC 33.7 31.5 - 36.5 g/dL    RDW 17.6 (H) 10.0 - 15.0 %    Platelet Count 245 150 - 450 10e9/L    Diff Method Automated Method     % Neutrophils 68.6 %    % Lymphocytes 20.3 %    % Monocytes 4.7 %    % Eosinophils 5.1 %    %  Basophils 1.0 %    % Immature Granulocytes 0.3 %    Nucleated RBCs 0 0 /100    Absolute Neutrophil 2.0 1.6 - 8.3 10e9/L    Absolute Lymphocytes 0.6 (L) 0.8 - 5.3 10e9/L    Absolute Monocytes 0.1 0.0 - 1.3 10e9/L    Absolute Eosinophils 0.2 0.0 - 0.7 10e9/L    Absolute Basophils 0.0 0.0 - 0.2 10e9/L    Abs Immature Granulocytes 0.0 0 - 0.4 10e9/L    Absolute Nucleated RBC 0.0    Comprehensive metabolic panel     Status: Abnormal   Result Value Ref Range    Sodium 140 133 - 144 mmol/L    Potassium 3.8 3.4 - 5.3 mmol/L    Chloride 107 94 - 109 mmol/L    Carbon Dioxide 29 20 - 32 mmol/L    Anion Gap 4 3 - 14 mmol/L    Glucose 111 (H) 70 - 99 mg/dL    Urea Nitrogen 13 7 - 30 mg/dL    Creatinine 0.67 0.66 - 1.25 mg/dL    GFR Estimate >90 >60 mL/min/[1.73_m2]    GFR Estimate If Black >90 >60 mL/min/[1.73_m2]    Calcium 9.0 8.5 - 10.1 mg/dL    Bilirubin Total 1.1 0.2 - 1.3 mg/dL    Albumin 4.2 3.4 - 5.0 g/dL    Protein Total 6.7 (L) 6.8 - 8.8 g/dL    Alkaline Phosphatase 55 40 - 150 U/L    ALT 70 0 - 70 U/L    AST 12 0 - 45 U/L   Amylase     Status: Abnormal   Result Value Ref Range    Amylase 24 (L) 30 - 110 U/L   Lipase     Status: Abnormal   Result Value Ref Range    Lipase 21 (L) 73 - 393 U/L   Cell count with differential CSF:     Status: None   Result Value Ref Range    WBC CSF 1 0 - 5 /uL    RBC CSF 1 0 - 2 /uL    Tube Number 2 #    Color CSF Colorless CLRL^Colorless    Appearance CSF Clear CLER^Clear     Assessment:  Lazaro Lund is a 19 year old young man with T cell lymphoblastic lymphoma (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  He is being treated per [facility] protocol [id number].   He's had a CRu following Induction with a CR at the end of Consolidation. His course has been complicated by extensive bilateral DVT and severe necrotizing pancreatitis.  He completed treatment with lovenox. Recent abdominal CT shows continued improvement in regards to the sequelae from his pancreatitis.  Asparaginase was permanently discontinued from  his treatment regimen. He is on Vit D for deficiency.  He comes to clinic today for Day 29 of Cycle 1 of Maintenance therapy.  He's had increased nausea since starting Maintenance therapy.  No abdominal pain.  LFTs and pancreatic enzymes look good.  His blood counts look good as well, ANC above target range.  His weight has dropped about 8lbs in the past 3 weeks.    Plan:   1) Reviewed labs with Lazaro, continue current doses of chemo and start prednisone burst.  2) Discussed nausea at length, it is reassuring that his CMP and pancreatic enzymes looks good.  I would like him to restart his protonix and take regularly (he is currently just taking a few times per week).  Also recommend taking zofran daily at bedtime and restarting scop patch.  I sent TPMT metabolites and it will be interesting to see if he has an elevated 6MMP.  I will follow up with Lazaro when that is back.  In the meantime, if symptoms don't improve or he continues to lose weight I've asked him to call.  3) Continue to monitor for new symptoms of thrombosis while off of lovenox. Low threshold to repeat U/S if any signs of thrombus.   4) Lazaro's most recent CT ordered by Dr Coronel looks good and he not longer requires additional imaging or follow up with him.  5) Continue Vit D 3 2000 IU daily (last level was 19 on 3/5/20).  Recheck level in August.  6) Day 29 Induction LP deferred, plan to make-up dose in 5th cycle of Maintenance therapy.  7) Given significant spinal headache history will plan for IV caffeine following LPs in the future.   8) Lazaro is doing well with smoking cessation, continue to follow. Have addressed marijuana use and associated risks.  9)  RTC in 1 month for D57 of Cycle 1, sooner if nausea doesn't improve.      Procedure Note:  A Lumbar Puncture was performed in the Pediatric Sedation Suite. Informed consent was obtained prior to the procedure. Lazaro Lund was identified by facial recognition and ID arm band. A  time-out was performed. Lazaro Lund was then placed in the left lateral decubitus position and the lumbosacral area was sterily prepped using Chloraprep followed by drape placement. Anatomic landmarks were identified by palpation. Then, a 22 gauge, 3.5 inch spinal needle was easily inserted into the L4/L5 interspace. On the first attempt approximately 3 mL of clear and colorless cerebrospinal fluid was obtained to be sent to the lab for cell count analysis and cytospin. Following that, 15 mg of intrathecal Methotrexate in 6 mL of preservative-free normal saline was infused without resistance. The needle was removed and a Band-Aid applied. Lazaro Lund tolerated this procedure very well.     Pediatric Hematology/Oncology Attending Dr. Mercado was present for the entire procedure.    Guillermo Rowan MD  Pediatric Hematology/Oncology & BMT Fellow    I was present for the procedure.  Todd Mercado MD/PhD  Pediatric Oncology        YOHAN Dunn CNP

## 2020-05-14 NOTE — ANESTHESIA PREPROCEDURE EVALUATION
Anesthesia Pre-Procedure Evaluation    Patient: Lazaro Lund   MRN:     [id number] Gender:   male   Age:    20 year old :      2000        Preoperative Diagnosis: Lymphoma (H) [C85.90]   Procedure(s):  Lumbar puncture with intrathecal Chemotherapy (not CD)     LABS:  CBC:   Lab Results   Component Value Date    WBC 3.0 (L) 2020    WBC 2.3 (L) 2020    HGB 11.4 (L) 2020    HGB 10.5 (L) 2020    HCT 33.8 (L) 2020    HCT 31.5 (L) 2020     2020     2020     BMP:   Lab Results   Component Value Date     2020     2020    POTASSIUM 4.2 2020    POTASSIUM 3.6 2020    CHLORIDE 109 2020    CHLORIDE 112 (H) 2020    CO2 28 2020    CO2 27 2020    BUN 14 2020    BUN 14 2020    CR 0.73 2020    CR 0.64 (L) 2020    GLC 97 2020    GLC 98 2020     COAGS:   Lab Results   Component Value Date    PTT 45 (H) 2019    INR 1.09 2019    FIBR 293 2019     POC:   Lab Results   Component Value Date    BGM 87 2019     OTHER:   Lab Results   Component Value Date    LACT 0.4 (L) 2019    MICHAEL 8.1 (L) 2020    PHOS 4.2 2019    MAG 2.1 2019    ALBUMIN 4.0 2020    PROTTOTAL 6.6 (L) 2020    ALT 21 2020    AST 9 2020    ALKPHOS 70 2020    BILITOTAL 0.3 2020    LIPASE 12 (L) 2020    AMYLASE 246 (H) 2019    .0 (H) 2019        Preop Vitals    BP Readings from Last 3 Encounters:   20 127/71   20 123/57   20 128/67    Pulse Readings from Last 3 Encounters:   20 86   20 70   20 68      Resp Readings from Last 3 Encounters:   20 20   20 20   20 22    SpO2 Readings from Last 3 Encounters:   20 100%   20 100%   20 100%      Temp Readings from Last 1 Encounters:   20 36.8  C (98.2  F) (Oral)    Ht Readings from Last 1  "Encounters:   05/14/20 1.843 m (6' 0.56\")      Wt Readings from Last 1 Encounters:   05/14/20 83.9 kg (184 lb 15.5 oz)    Estimated body mass index is 24.7 kg/m  as calculated from the following:    Height as of this encounter: 1.843 m (6' 0.56\").    Weight as of this encounter: 83.9 kg (184 lb 15.5 oz).     LDA:  Port A Cath Single 10/24/19 Right Chest wall (Active)   Access Date 04/23/20 04/23/20 0730   Access Attempts 1 04/23/20 0730   Gauge Power noncoring 90 degree bend;20 gauge;3/4 inch 04/23/20 0730   Site Assessment WDL 04/23/20 1139   Line Status Blood return noted;Heparin locked 04/23/20 1139   Extravasation? No 04/23/20 1139   Dressing Intervention Transparent 04/23/20 1139   Dressing change due 04/02/20 03/26/20 0845   Needle Change Due 04/02/20 03/26/20 0845   Line Necessity Yes, meets criteria 03/26/20 0845   De-Access Date 04/23/20 04/23/20 1139   Date to be Reflushed 05/21/20 04/23/20 1139   Number of days: 203        Past Medical History:   Diagnosis Date     Acute necrotizing pancreatitis 11/07/2019    attributed to asparaginase     Acute pancreatitis due to PEGaspariginase therapy  11/15/2019     DVT of upper extremity (deep vein thrombosis) (H) 09/26/2019    Bilateral      Edema of upper extremity 10/17/2019     Folliculitis 10/17/2019     Migraine 2006    have resolved     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      Past Surgical History:   Procedure Laterality Date     BONE MARROW BIOPSY, BONE SPECIMEN, NEEDLE/TROCAR Left 9/1/2019    Procedure: BIOPSY, BONE MARROW;  Surgeon: Heather Lopez MD;  [facility]: [facility]     INSERT PICC LINE N/A 8/31/2019    Procedure: INSERTION, PICC;  Surgeon: Michell Kieth MD;  [facility]: [facility] OR     INSERT PORT VASCULAR ACCESS N/A 10/24/2019    Procedure: INSERTION, VASCULAR ACCESS PORT;  Surgeon: Silviano Martins MD;  [facility]: [facility] P[facility]      IR CHEST PORT PLACEMENT > 5 YRS OF AGE  10/24/2019     IR CHEST TUBE PLACEMENT NON-TUNNELLED LEFT  8/31/2019 "     IR PICC PLACEMENT > 5 YRS OF AGE  8/31/2019     IR PORT CHECK RIGHT  11/12/2019     SPINAL PUNCTURE,LUMBAR, INTRATHECAL CHEMO DELIVERY N/A 8/31/2019    Procedure: LUMBAR PUNCTURE, WITH INTRATHECAL CHEMOTHERAPY ADMINISTRATION;  Surgeon: Heather Lopze MD;  [facility]     SPINAL PUNCTURE,LUMBAR, INTRATHECAL CHEMO DELIVERY N/A 9/9/2019    Procedure: Lumbar Puncture With Intrathecal Chemo;  Surgeon: Alexi Hayes MD;  [facility]     SPINAL PUNCTURE,LUMBAR, INTRATHECAL CHEMO DELIVERY N/A 10/10/2019    Procedure: Lumbar puncture with IT Chemo (CD);  Surgeon: Reynaldo Campuzano MD;  [facility]      SPINAL PUNCTURE,LUMBAR, INTRATHECAL CHEMO DELIVERY N/A 10/17/2019    Procedure: Lumbar puncture with IT Chemo (not CD);  Surgeon: Reynaldo Campuzano MD;  [facility]      SPINAL PUNCTURE,LUMBAR, INTRATHECAL CHEMO DELIVERY N/A 10/24/2019    Procedure: LUMBAR PUNCTURE, WITH INTRATHECAL CHEMOTHERAPY ADMINISTRATION;  Surgeon: Félix Van APRN CNP;  [facility]      SPINAL PUNCTURE,LUMBAR, INTRATHECAL CHEMO DELIVERY N/A 10/31/2019    Procedure: Lumbar puncture with IT Chemo (not CD);  Surgeon: Reynaldo Campuzano MD;  [facility]      SPINAL PUNCTURE,LUMBAR, INTRATHECAL CHEMO DELIVERY N/A 12/19/2019    Procedure: Lumbar puncture with IT Chem (CD);  Surgeon: Félix Van APRN CNP;  [facility]      SPINAL PUNCTURE,LUMBAR, INTRATHECAL CHEMO DELIVERY N/A 12/23/2019    Procedure: Lumbar puncture with IT Chem (CD);  Surgeon: Heather Lopez MD;  [facility]      SPINAL PUNCTURE,LUMBAR, INTRATHECAL CHEMO DELIVERY N/A 1/23/2020    Procedure: Lumbar puncture with IT Chem (CD);  Surgeon: Reynaldo Campuzano MD;  [facility]      SPINAL PUNCTURE,LUMBAR, INTRATHECAL CHEMO DELIVERY N/A 2/20/2020    Procedure: Lumbar puncture with intrathecal Chemotherapy (CD);   Surgeon: Reynaldo Campuzano MD;  [facility]      SPINAL PUNCTURE,LUMBAR, INTRATHECAL CHEMO DELIVERY N/A 3/19/2020    Procedure: Lumbar puncture with intrathecal Chemotherapy (CD);  Surgeon: Reynaldo Campuzano MD;  [facility]      SPINAL PUNCTURE,LUMBAR, INTRATHECAL CHEMO DELIVERY N/A 3/26/2020    Procedure: Lumbar puncture with intrathecal Chemotherapy (not CD);  Surgeon: Todd Mercado MD;  [facility]      SPINAL PUNCTURE,LUMBAR, INTRATHECAL CHEMO DELIVERY N/A 4/23/2020    Procedure: Lumbar puncture with intrathecal Chemotherapy (CD);  Surgeon: Marleny Brewer MD;  [facility]      THORACENTESIS N/A 8/31/2019    Procedure: Thoracentesis;  Surgeon: Michell Keith MD;  [facility]      Allergies   Allergen Reactions     Asparaginase Derivatives Other (See Comments)     Severe pancreatitis     No Known Drug Allergies         Anesthesia Evaluation    ROS/Med Hx    No history of anesthetic complications    Cardiovascular Findings   Comments: TTE 02/04/2020: Normal echocardiogram. Normal appearance and motion of the tricuspid, mitral, pulmonary and aortic valves. No atrial, ventricular or arterial level shunting. LV and RV have normal chamber size, wall thickness, and systolic function. LVEF 67 %.    Neuro Findings   Comments: Migraine   Neuropathic pain   Insomnia due to medical condition         Pulmonary Findings - negative ROS    HENT Findings - negative HENT ROS    Skin Findings - negative skin ROS      GI/Hepatic/Renal Findings - negative ROS    Endocrine/Metabolic Findings       Comments: Vitamin D deficiency     Genetic/Syndrome Findings - negative genetics/syndromes ROS    Hematology/Oncology Findings   (+) cancer (  Lymphoma (H) ) and blood dyscrasia    Additional Notes  Edema of upper extremity  Smokes marijuana  Nicotine patch          PHYSICAL EXAM:   Mental Status/Neuro: A/A/O   Airway: Facies: Feasible  Mallampati:  I  Mouth/Opening: Full  TM distance: > 6 cm  Neck ROM: Full   Respiratory: Auscultation: CTAB     Resp. Rate: Normal     Resp. Effort: Normal      CV: Rhythm: Regular  Rate: Age appropriate  Heart: Normal Sounds  Edema: None   Comments: Nauseated, repeatedly gags and brings up phlegm     Dental: Normal Dentition                Assessment:   ASA SCORE: 3    H&P: History and physical reviewed and following examination; no interval change.     Smoking Status:  Active Smoker       - patient did not smoke on day of surgery       - instructed to abstain from smoking on day of procedure   NPO Status: ELEVATED Aspiration Risk/Unknown     Plan:   Anes. Type:  MAC   Pre-Medication: Midazolam   Induction:  N/a   Airway: Native Airway   Access/Monitoring: PIV   Maintenance: N/a     Postop Plan:   Postop Pain: None  Postop Sedation/Airway: Not planned  Disposition: Outpatient     PONV Management:    Prevention: Ondansetron     CONSENT: Direct conversation   Plan and risks discussed with: Patient   Blood Products: Consent Deferred (Minimal Blood Loss)       Comments for Plan/Consent:  Discussed common and potentially harmful risks for MAC, Native airway due to gagging and phlegm emesis. Will not go all the way to [place] for this procedure to decrease aspiration risk.  These risks include, but were not limited to: Conversion to secured airway, Sore throat, Airway injury, Dental injury, Aspiration, Respiratory issues (Bronchospasm, Laryngospasm, Desaturation), Hemodynamic issues (Arrhythmia, Hypotension, Ischemia), Potential long term consequences of respiratory and hemodynamic issues, PONV, Emergence delirium  Risks of invasive procedures were not discussed: N/A    All questions were answered.           Kyle Palomino MD

## 2020-05-14 NOTE — PROGRESS NOTES
Procedure Note:  A Lumbar Puncture was performed in the Pediatric Sedation Suite. Informed consent was obtained prior to the procedure. Lazaro Lund was identified by facial recognition and ID arm band. A time-out was performed. Lazaro Lund was then placed in the left lateral decubitus position and the lumbosacral area was sterily prepped using Chloraprep followed by drape placement. Anatomic landmarks were identified by palpation. Then, a 22 gauge, 3.5 inch spinal needle was easily inserted into the L4/L5 interspace. On the first attempt approximately 3 mL of clear and colorless cerebrospinal fluid was obtained to be sent to the lab for cell count analysis and cytospin. Following that, 15 mg of intrathecal Methotrexate in 6 mL of preservative-free normal saline was infused without resistance. The needle was removed and a Band-Aid applied. Lazaro Lund tolerated this procedure very well.     Pediatric Hematology/Oncology Attending Dr. Mercado was present for the entire procedure.    Guillermo Rowan MD  Pediatric Hematology/Oncology & BMT Fellow    I was present for the procedure.  Todd Mercado MD/PhD  Pediatric Oncology

## 2020-05-16 LAB
APPEARANCE CSF: CLEAR
COLOR CSF: COLORLESS
RBC # CSF MANUAL: 1 /UL (ref 0–2)
TUBE # CSF: 2 #
WBC # CSF MANUAL: 1 /UL (ref 0–5)

## 2020-05-23 LAB
6-TGN ENTSUB RBC: 951 (ref 235–400)
6MMP ENTSUB RBC: ABNORMAL

## 2020-05-27 ENCOUNTER — TELEPHONE (OUTPATIENT)
Dept: PEDIATRIC HEMATOLOGY/ONCOLOGY | Facility: CLINIC | Age: 20
End: 2020-05-27

## 2020-05-27 NOTE — TELEPHONE ENCOUNTER
I tried calling the patient about completing their wellness screening for their future appointment. There was no answer, so I left a message for them to call us back at the  of the [facility] clinic. [phone]    Josie Hernández LPN  May 27, 2020

## 2020-05-28 ENCOUNTER — INFUSION THERAPY VISIT (OUTPATIENT)
Dept: INFUSION THERAPY | Facility: CLINIC | Age: 20
End: 2020-05-28
Attending: NURSE PRACTITIONER
Payer: COMMERCIAL

## 2020-05-28 ENCOUNTER — OFFICE VISIT (OUTPATIENT)
Dept: PEDIATRIC HEMATOLOGY/ONCOLOGY | Facility: CLINIC | Age: 20
End: 2020-05-28
Attending: NURSE PRACTITIONER
Payer: COMMERCIAL

## 2020-05-28 VITALS
HEIGHT: 73 IN | BODY MASS INDEX: 25.19 KG/M2 | SYSTOLIC BLOOD PRESSURE: 116 MMHG | OXYGEN SATURATION: 99 % | HEART RATE: 84 BPM | WEIGHT: 190.04 LBS | TEMPERATURE: 98.6 F | DIASTOLIC BLOOD PRESSURE: 68 MMHG | RESPIRATION RATE: 18 BRPM

## 2020-05-28 DIAGNOSIS — C83.50 T LYMPHOBLASTIC LYMPHOMA (H): Primary | ICD-10-CM

## 2020-05-28 DIAGNOSIS — C83.50 T LYMPHOBLASTIC LYMPHOMA (H): ICD-10-CM

## 2020-05-28 LAB
ALBUMIN SERPL-MCNC: 4.3 G/DL (ref 3.4–5)
ALP SERPL-CCNC: 58 U/L (ref 40–150)
ALT SERPL W P-5'-P-CCNC: 50 U/L (ref 0–70)
ANION GAP SERPL CALCULATED.3IONS-SCNC: 6 MMOL/L (ref 3–14)
AST SERPL W P-5'-P-CCNC: 16 U/L (ref 0–45)
BASOPHILS # BLD AUTO: 0 10E9/L (ref 0–0.2)
BASOPHILS NFR BLD AUTO: 0.3 %
BILIRUB SERPL-MCNC: 0.6 MG/DL (ref 0.2–1.3)
BUN SERPL-MCNC: 17 MG/DL (ref 7–30)
CALCIUM SERPL-MCNC: 8.9 MG/DL (ref 8.5–10.1)
CHLORIDE SERPL-SCNC: 108 MMOL/L (ref 94–109)
CO2 SERPL-SCNC: 27 MMOL/L (ref 20–32)
CREAT SERPL-MCNC: 0.68 MG/DL (ref 0.66–1.25)
DIFFERENTIAL METHOD BLD: ABNORMAL
EOSINOPHIL # BLD AUTO: 0.1 10E9/L (ref 0–0.7)
EOSINOPHIL NFR BLD AUTO: 2.8 %
ERYTHROCYTE [DISTWIDTH] IN BLOOD BY AUTOMATED COUNT: 18.4 % (ref 10–15)
GFR SERPL CREATININE-BSD FRML MDRD: >90 ML/MIN/{1.73_M2}
GLUCOSE SERPL-MCNC: 96 MG/DL (ref 70–99)
HCT VFR BLD AUTO: 33.6 % (ref 40–53)
HGB BLD-MCNC: 10.8 G/DL (ref 13.3–17.7)
IMM GRANULOCYTES # BLD: 0 10E9/L (ref 0–0.4)
IMM GRANULOCYTES NFR BLD: 0.3 %
LYMPHOCYTES # BLD AUTO: 0.5 10E9/L (ref 0.8–5.3)
LYMPHOCYTES NFR BLD AUTO: 15.7 %
MCH RBC QN AUTO: 33 PG (ref 26.5–33)
MCHC RBC AUTO-ENTMCNC: 32.1 G/DL (ref 31.5–36.5)
MCV RBC AUTO: 103 FL (ref 78–100)
MONOCYTES # BLD AUTO: 0.2 10E9/L (ref 0–1.3)
MONOCYTES NFR BLD AUTO: 5.9 %
NEUTROPHILS # BLD AUTO: 2.1 10E9/L (ref 1.6–8.3)
NEUTROPHILS NFR BLD AUTO: 75 %
NRBC # BLD AUTO: 0 10*3/UL
NRBC BLD AUTO-RTO: 0 /100
PLATELET # BLD AUTO: 256 10E9/L (ref 150–450)
POTASSIUM SERPL-SCNC: 4.1 MMOL/L (ref 3.4–5.3)
PROT SERPL-MCNC: 6.8 G/DL (ref 6.8–8.8)
RBC # BLD AUTO: 3.27 10E12/L (ref 4.4–5.9)
SODIUM SERPL-SCNC: 141 MMOL/L (ref 133–144)
WBC # BLD AUTO: 2.9 10E9/L (ref 4–11)

## 2020-05-28 PROCEDURE — 80299 QUANTITATIVE ASSAY DRUG: CPT | Performed by: NURSE PRACTITIONER

## 2020-05-28 PROCEDURE — 85025 COMPLETE CBC W/AUTO DIFF WBC: CPT | Performed by: NURSE PRACTITIONER

## 2020-05-28 PROCEDURE — 36415 COLL VENOUS BLD VENIPUNCTURE: CPT | Performed by: NURSE PRACTITIONER

## 2020-05-28 PROCEDURE — 80053 COMPREHEN METABOLIC PANEL: CPT | Performed by: NURSE PRACTITIONER

## 2020-05-28 PROCEDURE — G0463 HOSPITAL OUTPT CLINIC VISIT: HCPCS | Mod: ZF

## 2020-05-28 ASSESSMENT — PAIN SCALES - GENERAL: PAINLEVEL: NO PAIN (0)

## 2020-05-28 ASSESSMENT — MIFFLIN-ST. JEOR: SCORE: 1908.87

## 2020-05-28 NOTE — PROGRESS NOTES
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 Lazaro presented with acute onset cough and was found to have an anterior mediastinal mass and malignant left-sided pleural effusion.  A CT guided biopsy was obtained at [facility] and pathology was consistent with T-cell leukemia vs lymphoma.  He was admitted to [facility] oncology service 8/30 and started on treatment per [id number].  He had a pleural effusion that required a chest tube and a pericardial effusion that was not drained. His Induction was complicated by cardiac compression secondary to his mass leading to tamponade, this improved through out his hospital stay.  He also had dysphagia that improved with treatment of his mass, swallow study was normal. His course was recently complicated by extensive bilateral UE DVTs, he remains on anticoagulation.  Most recently his course was complicated by the development of severe asparaginase induced pancreatitis. He became quite ill with SIRS and associated hypotension, he required pressor support in the ICU.  Fortunately Lazaro recovered well and his subsequent imaging has continued to show improvement.  He comes to clinic today for labs and exam in his 1st Maintenance cycle.    HPI:   At Lazaro's D29 visit he had significant nausea that lead to weight loss.  He was having a hard time eating, especially in the morning.  His TGNs recently came back showing an elevated 6MMP level.    Since his last visit, Lazaro reports his morning nausea continues but has improved somewhat.  He still smoke marijuana most mornings which helps.  He tried zofran again without relief.  Once his morning nausea subsides he is usually able to eat pretty well.  He denies abdominal pain.   He is passing stool without difficulty, no diarrhea.  A few new spots on his back he'd like looked at, he thinks they are probably just acne.  He denies pain.  He is sleeping well at  night, at leas 8-9 hours now and sometimes takes a nap. No fever.  No paresthesias.  Mood is good.    Lazaro has done well with cigarette smoking cessation.  He is no longer using patches.  His mom is living elsewhere to avoid exposure to his grandma and he feels this has been helpful since his mom is a smoker and being around it would tempt him.    Review of systems:  Pertinent positives reported in the HPI. All other systems in a complete and comprehensive review of systems were negative..    PMH:   Past Medical History:   Diagnosis Date     Acute necrotizing pancreatitis 11/07/2019    attributed to asparaginase     Acute pancreatitis due to PEGaspariginase therapy  11/15/2019     DVT of upper extremity (deep vein thrombosis) (H) 09/26/2019    Bilateral      Edema of upper extremity 10/17/2019     Folliculitis 10/17/2019     Migraine 2006    have resolved     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   -- Spinal HA following LP  -- TPMT shows Intermediate activity with normal TPMT/NUDT15 genotype  -- Factor V leiden and prothrombin gene mutation negative  -- Necrotizing pancreatitis secondary to asparaginase    PFMH:   Family History   Problem Relation Age of Onset     No Known Problems Mother      No Known Problems Father      Asthma Brother      Thyroid Cancer Paternal Grandmother      Melanoma Paternal Aunt      Social History: Lazaro previously lived at home with his dad and step mom in [place] but is now staying with his grandma in [place].  He has stopped working since [employer] restriction went into place.  He has been enjoying fishing and recently caught a good sized small mouth bass.    Current Medications:  Current Outpatient Medications on File Prior to Visit   Medication Sig Dispense Refill     diphenhydrAMINE (BENADRYL) 25 MG capsule Take 1-2 capsules (25-50 mg) by mouth every 6 hours as needed (Breakthrough Nausea and Vomiting ) 30 capsule 1     lidocaine-prilocaine (EMLA) 2.5-2.5 % external cream  "Apply topically as needed for other (prior to port access) 30 g 6     mercaptopurine (PURINETHOL) 50 MG tablet Take 3 tablets (150mg) four days per week and 3.5 tablets (175mg) three days/week. 96 tablet 2     methotrexate 2.5 MG tablet Take 17 tablets (42.5 mg) by mouth once a week Do not take on Days of LP. 68 tablet 2     ondansetron (ZOFRAN-ODT) 8 MG ODT tab Take 1 tablet (8 mg) by mouth every 8 hours as needed for nausea 20 tablet 6     pantoprazole (PROTONIX) 40 MG EC tablet Take 1 tablet (40 mg) by mouth every morning (before breakfast) 30 tablet 2     polyethylene glycol (MIRALAX/GLYCOLAX) packet Take 17 g by mouth daily 30 packet 0     predniSONE (DELTASONE) 10 MG tablet Take 45mg (4.5 tabs) in the AM and 40mg (4 tabs) in the PM for 5 days 43 tablet 2     scopolamine (TRANSDERM) 1 MG/3DAYS 72 hr patch Place 1 patch onto the skin every 72 hours 10 patch 3     scopolamine (TRANSDERM) 1 MG/3DAYS 72 hr patch Place 1 patch onto the skin every 72 hours 10 patch 3     sennosides (SENOKOT) 8.6 MG tablet Take 2 tablets by mouth 2 times daily as needed for constipation 60 each 1     sulfamethoxazole-trimethoprim (BACTRIM DS) 800-160 MG tablet Take 1 tablet by mouth Every Mon, Tues two times daily 16 tablet 11     Vitamin D, Cholecalciferol, 25 MCG (1000 UT) TABS Take 2 tablets (2,000 Units) by mouth daily 60 tablet 3       Reviewed medications with Lazaro, he has not missed any doses of chemotherapy.    Received influenza vaccine for the 6220-8676 season.      Physical Exam:   Temp:  [98.6  F (37  C)] 98.6  F (37  C)  Pulse:  [84] 84  Resp:  [18] 18  BP: (116)/(68) 116/68  SpO2:  [99 %] 99 %     Wt Readings from Last 4 Encounters:   05/28/20 86.2 kg (190 lb 0.6 oz)   05/14/20 83.9 kg (184 lb 15.5 oz)   04/23/20 87.4 kg (192 lb 10.9 oz)   04/23/20 87.4 kg (192 lb 10.9 oz)     Ht Readings from Last 2 Encounters:   05/28/20 1.843 m (6' 0.56\")   05/14/20 1.843 m (6' 0.56\")     General: Lazaro is alert, interactive and " appropriate. He is nauseous and vomits during the visit.  HEENT: Skull is atrauamatic and normocephalic.  Full head of hair. PERRLA, sclera are non icteric and not injected, EOM are intact, gaze slightly disconjugate which is his baseline. Nares are patent without drainage. Oropharynx clear, no plaques noted. Buccal mucosa and tongue clear. MMM.  Neck:  Supple without lymphadenopathy.   Lymph: There is no cervical, supraclavicular, axillary, lymphadenopathy palpated.  Cardiovascular:  HR is regular, S1, S2 no murmur.  Capillary refill is < 2 seconds. Right arm without edema or erythema.  No pitting edema.  Cap refill < 2 sec. Peripheral pulses 2+/=. He has mildly distended veins noted on the left upper chest, not extending up to the neck, overlying the pectoralis.   Respiratory: No cough noted. Respirations are easy.  Lungs are clear to auscultation throughout.  No crackles or wheezes.  Gastrointestinal:  BS present in all quadrants.  Abdomen is soft and flat.  Lazaro denies LUQ discomfort, no pain with palpation. No hepatosplenomegaly or masses are palpated.  Skin: Few scattered acne lesions on his lower back.  No surrounding erythema, no drainage. No other skin lesions noted.  Neurological:  CN 2-12 grossly intact. Gait is normal.  No issues with balance. Sensation intact in hands and feet.     Musculoskeletal:  Good strength and ROM in all extremities.  Strong dorsiflexion at ankles and great toes (5/5) bilaterally without any pain at the Achilles.    Labs:   Results for orders placed or performed in visit on 05/28/20   CBC with platelets differential     Status: Abnormal   Result Value Ref Range    WBC 2.9 (L) 4.0 - 11.0 10e9/L    RBC Count 3.27 (L) 4.4 - 5.9 10e12/L    Hemoglobin 10.8 (L) 13.3 - 17.7 g/dL    Hematocrit 33.6 (L) 40.0 - 53.0 %     (H) 78 - 100 fl    MCH 33.0 26.5 - 33.0 pg    MCHC 32.1 31.5 - 36.5 g/dL    RDW 18.4 (H) 10.0 - 15.0 %    Platelet Count 256 150 - 450 10e9/L    Diff Method  Automated Method     % Neutrophils 75.0 %    % Lymphocytes 15.7 %    % Monocytes 5.9 %    % Eosinophils 2.8 %    % Basophils 0.3 %    % Immature Granulocytes 0.3 %    Nucleated RBCs 0 0 /100    Absolute Neutrophil 2.1 1.6 - 8.3 10e9/L    Absolute Lymphocytes 0.5 (L) 0.8 - 5.3 10e9/L    Absolute Monocytes 0.2 0.0 - 1.3 10e9/L    Absolute Eosinophils 0.1 0.0 - 0.7 10e9/L    Absolute Basophils 0.0 0.0 - 0.2 10e9/L    Abs Immature Granulocytes 0.0 0 - 0.4 10e9/L    Absolute Nucleated RBC 0.0    Comprehensive metabolic panel     Status: None   Result Value Ref Range    Sodium 141 133 - 144 mmol/L    Potassium 4.1 3.4 - 5.3 mmol/L    Chloride 108 94 - 109 mmol/L    Carbon Dioxide 27 20 - 32 mmol/L    Anion Gap 6 3 - 14 mmol/L    Glucose 96 70 - 99 mg/dL    Urea Nitrogen 17 7 - 30 mg/dL    Creatinine 0.68 0.66 - 1.25 mg/dL    GFR Estimate >90 >60 mL/min/[1.73_m2]    GFR Estimate If Black >90 >60 mL/min/[1.73_m2]    Calcium 8.9 8.5 - 10.1 mg/dL    Bilirubin Total 0.6 0.2 - 1.3 mg/dL    Albumin 4.3 3.4 - 5.0 g/dL    Protein Total 6.8 6.8 - 8.8 g/dL    Alkaline Phosphatase 58 40 - 150 U/L    ALT 50 0 - 70 U/L    AST 16 0 - 45 U/L     Assessment:  Lazaro Lund is a 20 year old young man with T cell lymphoblastic lymphoma (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  He is being treated per COG protocol [id number].   He's had a CRu following Induction with a CR at the end of Consolidation. His course has been complicated by extensive bilateral DVT and severe necrotizing pancreatitis.  He completed treatment with lovenox. Recent abdominal CT shows continued improvement in regards to the sequelae from his pancreatitis.  Asparaginase was permanently discontinued from his treatment regimen. He is on Vit D for deficiency.  He comes to clinic today for labs and exam in Cycle 1 of Maintenance therapy.  He had a significant increase in nausea since starting Maintenance that lead to weight loss.  His weight has rebounded and nausea has improved somewhat  although still persists. His 6MMP level is elevated at 11,803 which is likely the cause of his increased nausea. His blood counts look good, ANC above target range.  He is otherwise doing well.    Plan:   1) Reviewed labs with Lazaro, continue current doses of chemo.  Continue protonix.  2) Discussed elevated 6MMP level.  I would like to see what his labs look like from today (specifically his TGNs) to determine if he may benefit from the use of allopurinol to modify his metabolism.  If that is the case, will plan on a brief washout period before starting allopurinol along with reduced dose 6MP and methotrexate.  I will call Lazaro when results are back if they return prior to his D57 visit.   3) Continue to monitor for new symptoms of thrombosis while off of lovenox. Low threshold to repeat U/S if any signs of thrombus.   4) Lazaro's most recent CT ordered by Dr Coronel looks good and he not longer requires additional imaging or follow up with him.  5) Continue Vit D 3 2000 IU daily (last level was 19 on 3/5/20).  Recheck level in August.  6) Day 29 Induction LP deferred, plan to make-up dose in 5th cycle of Maintenance therapy.  7) Given significant spinal headache history will plan for IV caffeine following LPs in the future.   8) Lazaro is doing well with smoking cessation, continue to follow. Have addressed marijuana use and associated risks.  9)  RTC in 2 weeks for D57 of Cycle 1.

## 2020-05-28 NOTE — LETTER
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 Lazaro presented with acute onset cough and was found to have an anterior mediastinal mass and malignant left-sided pleural effusion.  A CT guided biopsy was obtained at [facility] and pathology was consistent with T-cell leukemia vs lymphoma.  He was admitted to [facility] oncology service 8/30 and started on treatment per [id number].  He had a pleural effusion that required a chest tube and a pericardial effusion that was not drained. His Induction was complicated by cardiac compression secondary to his mass leading to tamponade, this improved through out his hospital stay.  He also had dysphagia that improved with treatment of his mass, swallow study was normal. His course was recently complicated by extensive bilateral UE DVTs, he remains on anticoagulation.  Most recently his course was complicated by the development of severe asparaginase induced pancreatitis. He became quite ill with SIRS and associated hypotension, he required pressor support in the ICU.  Fortunately Lazaro recovered well and his subsequent imaging has continued to show improvement.  He comes to clinic today for labs and exam in his 1st Maintenance cycle.    HPI:   At Lazaro's D29 visit he had significant nausea that lead to weight loss.  He was having a hard time eating, especially in the morning.  His TGNs recently came back showing an elevated 6MMP level.    Since his last visit, Lazaro reports his morning nausea continues but has improved somewhat.  He still smoke marijuana most mornings which helps.  He tried zofran again without relief.  Once his morning nausea subsides he is usually able to eat pretty well.  He denies abdominal pain.   He is passing stool without difficulty, no diarrhea.  A few new spots on his back he'd like  looked at, he thinks they are probably just acne.  He denies pain.  He is sleeping well at night, at leas 8-9 hours now and sometimes takes a nap. No fever.  No paresthesias.  Mood is good.    Lazaro has done well with cigarette smoking cessation.  He is no longer using patches.  His mom is living elsewhere to avoid exposure to his grandma and he feels this has been helpful since his mom is a smoker and being around it would tempt him.    Review of systems:  Pertinent positives reported in the HPI. All other systems in a complete and comprehensive review of systems were negative..    PMH:   Past Medical History:   Diagnosis Date     Acute necrotizing pancreatitis 11/07/2019    attributed to asparaginase     Acute pancreatitis due to PEGaspariginase therapy  11/15/2019     DVT of upper extremity (deep vein thrombosis) (H) 09/26/2019    Bilateral      Edema of upper extremity 10/17/2019     Folliculitis 10/17/2019     Migraine 2006    have resolved     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   -- Spinal HA following LP  -- TPMT shows Intermediate activity with normal TPMT/NUDT15 genotype  -- Factor V leiden and prothrombin gene mutation negative  -- Necrotizing pancreatitis secondary to asparaginase    PFMH:   Family History   Problem Relation Age of Onset     No Known Problems Mother      No Known Problems Father      Asthma Brother      Thyroid Cancer Paternal Grandmother      Melanoma Paternal Aunt      Social History: Lazaro previously lived at home with his dad and step mom in [place] but is now staying with his grandma in [place].  He has stopped working since [employer] restriction went into place.  He has been enjoying fishing and recently caught a good sized small mouth bass.    Current Medications:  Current Outpatient Medications on File Prior to Visit   Medication Sig Dispense Refill     diphenhydrAMINE (BENADRYL) 25 MG capsule Take 1-2 capsules (25-50 mg) by mouth every 6 hours as needed (Breakthrough  Nausea and Vomiting ) 30 capsule 1     lidocaine-prilocaine (EMLA) 2.5-2.5 % external cream Apply topically as needed for other (prior to port access) 30 g 6     mercaptopurine (PURINETHOL) 50 MG tablet Take 3 tablets (150mg) four days per week and 3.5 tablets (175mg) three days/week. 96 tablet 2     methotrexate 2.5 MG tablet Take 17 tablets (42.5 mg) by mouth once a week Do not take on Days of LP. 68 tablet 2     ondansetron (ZOFRAN-ODT) 8 MG ODT tab Take 1 tablet (8 mg) by mouth every 8 hours as needed for nausea 20 tablet 6     pantoprazole (PROTONIX) 40 MG EC tablet Take 1 tablet (40 mg) by mouth every morning (before breakfast) 30 tablet 2     polyethylene glycol (MIRALAX/GLYCOLAX) packet Take 17 g by mouth daily 30 packet 0     predniSONE (DELTASONE) 10 MG tablet Take 45mg (4.5 tabs) in the AM and 40mg (4 tabs) in the PM for 5 days 43 tablet 2     scopolamine (TRANSDERM) 1 MG/3DAYS 72 hr patch Place 1 patch onto the skin every 72 hours 10 patch 3     scopolamine (TRANSDERM) 1 MG/3DAYS 72 hr patch Place 1 patch onto the skin every 72 hours 10 patch 3     sennosides (SENOKOT) 8.6 MG tablet Take 2 tablets by mouth 2 times daily as needed for constipation 60 each 1     sulfamethoxazole-trimethoprim (BACTRIM DS) 800-160 MG tablet Take 1 tablet by mouth Every Mon, Tues two times daily 16 tablet 11     Vitamin D, Cholecalciferol, 25 MCG (1000 UT) TABS Take 2 tablets (2,000 Units) by mouth daily 60 tablet 3       Reviewed medications with Lazaro, he has not missed any doses of chemotherapy.    Received influenza vaccine for the 4521-9394 season.      Physical Exam:   Temp:  [98.6  F (37  C)] 98.6  F (37  C)  Pulse:  [84] 84  Resp:  [18] 18  BP: (116)/(68) 116/68  SpO2:  [99 %] 99 %     Wt Readings from Last 4 Encounters:   05/28/20 86.2 kg (190 lb 0.6 oz)   05/14/20 83.9 kg (184 lb 15.5 oz)   04/23/20 87.4 kg (192 lb 10.9 oz)   04/23/20 87.4 kg (192 lb 10.9 oz)     Ht Readings from Last 2 Encounters:   05/28/20 1.843  "m (6' 0.56\")   05/14/20 1.843 m (6' 0.56\")     General: Lazaro is alert, interactive and appropriate. He is nauseous and vomits during the visit.  HEENT: Skull is atrauamatic and normocephalic.  Full head of hair. PERRLA, sclera are non icteric and not injected, EOM are intact, gaze slightly disconjugate which is his baseline. Nares are patent without drainage. Oropharynx clear, no plaques noted. Buccal mucosa and tongue clear. MMM.  Neck:  Supple without lymphadenopathy.   Lymph: There is no cervical, supraclavicular, axillary, lymphadenopathy palpated.  Cardiovascular:  HR is regular, S1, S2 no murmur.  Capillary refill is < 2 seconds. Right arm without edema or erythema.  No pitting edema.  Cap refill < 2 sec. Peripheral pulses 2+/=. He has mildly distended veins noted on the left upper chest, not extending up to the neck, overlying the pectoralis.   Respiratory: No cough noted. Respirations are easy.  Lungs are clear to auscultation throughout.  No crackles or wheezes.  Gastrointestinal:  BS present in all quadrants.  Abdomen is soft and flat.  Lazaro denies LUQ discomfort, no pain with palpation. No hepatosplenomegaly or masses are palpated.  Skin: Few scattered acne lesions on his lower back.  No surrounding erythema, no drainage. No other skin lesions noted.  Neurological:  CN 2-12 grossly intact. Gait is normal.  No issues with balance. Sensation intact in hands and feet.     Musculoskeletal:  Good strength and ROM in all extremities.  Strong dorsiflexion at ankles and great toes (5/5) bilaterally without any pain at the Achilles.    Labs:   Results for orders placed or performed in visit on 05/28/20   CBC with platelets differential     Status: Abnormal   Result Value Ref Range    WBC 2.9 (L) 4.0 - 11.0 10e9/L    RBC Count 3.27 (L) 4.4 - 5.9 10e12/L    Hemoglobin 10.8 (L) 13.3 - 17.7 g/dL    Hematocrit 33.6 (L) 40.0 - 53.0 %     (H) 78 - 100 fl    MCH 33.0 26.5 - 33.0 pg    MCHC 32.1 31.5 - 36.5 g/dL "    RDW 18.4 (H) 10.0 - 15.0 %    Platelet Count 256 150 - 450 10e9/L    Diff Method Automated Method     % Neutrophils 75.0 %    % Lymphocytes 15.7 %    % Monocytes 5.9 %    % Eosinophils 2.8 %    % Basophils 0.3 %    % Immature Granulocytes 0.3 %    Nucleated RBCs 0 0 /100    Absolute Neutrophil 2.1 1.6 - 8.3 10e9/L    Absolute Lymphocytes 0.5 (L) 0.8 - 5.3 10e9/L    Absolute Monocytes 0.2 0.0 - 1.3 10e9/L    Absolute Eosinophils 0.1 0.0 - 0.7 10e9/L    Absolute Basophils 0.0 0.0 - 0.2 10e9/L    Abs Immature Granulocytes 0.0 0 - 0.4 10e9/L    Absolute Nucleated RBC 0.0    Comprehensive metabolic panel     Status: None   Result Value Ref Range    Sodium 141 133 - 144 mmol/L    Potassium 4.1 3.4 - 5.3 mmol/L    Chloride 108 94 - 109 mmol/L    Carbon Dioxide 27 20 - 32 mmol/L    Anion Gap 6 3 - 14 mmol/L    Glucose 96 70 - 99 mg/dL    Urea Nitrogen 17 7 - 30 mg/dL    Creatinine 0.68 0.66 - 1.25 mg/dL    GFR Estimate >90 >60 mL/min/[1.73_m2]    GFR Estimate If Black >90 >60 mL/min/[1.73_m2]    Calcium 8.9 8.5 - 10.1 mg/dL    Bilirubin Total 0.6 0.2 - 1.3 mg/dL    Albumin 4.3 3.4 - 5.0 g/dL    Protein Total 6.8 6.8 - 8.8 g/dL    Alkaline Phosphatase 58 40 - 150 U/L    ALT 50 0 - 70 U/L    AST 16 0 - 45 U/L     Assessment:  Lazaro Lund is a 20 year old young man with T cell lymphoblastic lymphoma (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  He is being treated per [facility]G protocol [id number].   He's had a CRu following Induction with a CR at the end of Consolidation. His course has been complicated by extensive bilateral DVT and severe necrotizing pancreatitis.  He completed treatment with lovenox. Recent abdominal CT shows continued improvement in regards to the sequelae from his pancreatitis.  Asparaginase was permanently discontinued from his treatment regimen. He is on Vit D for deficiency.  He comes to clinic today for labs and exam in Cycle 1 of Maintenance therapy.  He had a significant increase in nausea since starting Maintenance  that lead to weight loss.  His weight has rebounded and nausea has improved somewhat although still persists. His 6MMP level is elevated at 11,803 which is likely the cause of his increased nausea. His blood counts look good, ANC above target range.  He is otherwise doing well.    Plan:   1) Reviewed labs with Lazaro, continue current doses of chemo.  Continue protonix.  2) Discussed elevated 6MMP level.  I would like to see what his labs look like from today (specifically his TGNs) to determine if he may benefit from the use of allopurinol to modify his metabolism.  If that is the case, will plan on a brief washout period before starting allopurinol along with reduced dose 6MP and methotrexate.  I will call Lazaro when results are back if they return prior to his D57 visit.   3) Continue to monitor for new symptoms of thrombosis while off of lovenox. Low threshold to repeat U/S if any signs of thrombus.   4) Lazaro's most recent CT ordered by Dr Coronel looks good and he not longer requires additional imaging or follow up with him.  5) Continue Vit D 3 2000 IU daily (last level was 19 on 3/5/20).  Recheck level in August.  6) Day 29 Induction LP deferred, plan to make-up dose in 5th cycle of Maintenance therapy.  7) Given significant spinal headache history will plan for IV caffeine following LPs in the future.   8) Lazaro is doing well with smoking cessation, continue to follow. Have addressed marijuana use and associated risks.  9)  RTC in 2 weeks for D57 of Cycle 1.      YOHAN Dunn CNP

## 2020-05-28 NOTE — NURSING NOTE
"Chief Complaint   Patient presents with     RECHECK     Lymphoblastic lymphoma follow- up exam and labs       /68 (BP Location: Right arm, Patient Position: Sitting, Cuff Size: Adult Regular)   Pulse 84   Temp 98.6  F (37  C) (Oral)   Resp 18   Ht 1.843 m (6' 0.56\")   Wt 86.2 kg (190 lb 0.6 oz)   SpO2 99%   BMI 25.38 kg/m      Efrem Montejo LPN  May 28, 2020  "

## 2020-06-03 RX ORDER — MERCAPTOPURINE 50 MG/1
TABLET ORAL
Qty: 96 TABLET | Refills: 2 | Status: SHIPPED | OUTPATIENT
Start: 2020-06-03 | End: 2020-06-09

## 2020-06-08 ENCOUNTER — TELEPHONE (OUTPATIENT)
Dept: PEDIATRIC HEMATOLOGY/ONCOLOGY | Facility: CLINIC | Age: 20
End: 2020-06-08

## 2020-06-09 ENCOUNTER — OFFICE VISIT (OUTPATIENT)
Dept: PEDIATRIC HEMATOLOGY/ONCOLOGY | Facility: CLINIC | Age: 20
End: 2020-06-09
Attending: NURSE PRACTITIONER
Payer: COMMERCIAL

## 2020-06-09 ENCOUNTER — INFUSION THERAPY VISIT (OUTPATIENT)
Dept: INFUSION THERAPY | Facility: CLINIC | Age: 20
End: 2020-06-09
Attending: NURSE PRACTITIONER
Payer: COMMERCIAL

## 2020-06-09 VITALS
WEIGHT: 187.61 LBS | BODY MASS INDEX: 24.86 KG/M2 | SYSTOLIC BLOOD PRESSURE: 126 MMHG | HEART RATE: 71 BPM | DIASTOLIC BLOOD PRESSURE: 83 MMHG | HEIGHT: 73 IN | RESPIRATION RATE: 16 BRPM | OXYGEN SATURATION: 99 % | TEMPERATURE: 97.9 F

## 2020-06-09 DIAGNOSIS — C83.50 T LYMPHOBLASTIC LYMPHOMA (H): Primary | ICD-10-CM

## 2020-06-09 DIAGNOSIS — C83.50 T LYMPHOBLASTIC LYMPHOMA (H): ICD-10-CM

## 2020-06-09 LAB
6-TGN ENTSUB RBC: 906 (ref 235–400)
6MMP ENTSUB RBC: ABNORMAL
BASOPHILS # BLD AUTO: 0 10E9/L (ref 0–0.2)
BASOPHILS NFR BLD AUTO: 0.6 %
DIFFERENTIAL METHOD BLD: ABNORMAL
EOSINOPHIL # BLD AUTO: 0.1 10E9/L (ref 0–0.7)
EOSINOPHIL NFR BLD AUTO: 3.3 %
ERYTHROCYTE [DISTWIDTH] IN BLOOD BY AUTOMATED COUNT: 17.2 % (ref 10–15)
HCT VFR BLD AUTO: 31.5 % (ref 40–53)
HGB BLD-MCNC: 10.7 G/DL (ref 13.3–17.7)
IMM GRANULOCYTES # BLD: 0 10E9/L (ref 0–0.4)
IMM GRANULOCYTES NFR BLD: 0 %
LYMPHOCYTES # BLD AUTO: 0.4 10E9/L (ref 0.8–5.3)
LYMPHOCYTES NFR BLD AUTO: 22.1 %
MCH RBC QN AUTO: 34.3 PG (ref 26.5–33)
MCHC RBC AUTO-ENTMCNC: 34 G/DL (ref 31.5–36.5)
MCV RBC AUTO: 101 FL (ref 78–100)
MONOCYTES # BLD AUTO: 0.1 10E9/L (ref 0–1.3)
MONOCYTES NFR BLD AUTO: 6.6 %
NEUTROPHILS # BLD AUTO: 1.2 10E9/L (ref 1.6–8.3)
NEUTROPHILS NFR BLD AUTO: 67.4 %
NRBC # BLD AUTO: 0 10*3/UL
NRBC BLD AUTO-RTO: 0 /100
PLATELET # BLD AUTO: 198 10E9/L (ref 150–450)
RBC # BLD AUTO: 3.12 10E12/L (ref 4.4–5.9)
WBC # BLD AUTO: 1.8 10E9/L (ref 4–11)

## 2020-06-09 PROCEDURE — 96375 TX/PRO/DX INJ NEW DRUG ADDON: CPT

## 2020-06-09 PROCEDURE — 25800030 ZZH RX IP 258 OP 636: Mod: ZF | Performed by: PEDIATRICS

## 2020-06-09 PROCEDURE — 25000128 H RX IP 250 OP 636: Mod: ZF

## 2020-06-09 PROCEDURE — 96409 CHEMO IV PUSH SNGL DRUG: CPT

## 2020-06-09 PROCEDURE — 25800030 ZZH RX IP 258 OP 636: Mod: ZF | Performed by: NURSE PRACTITIONER

## 2020-06-09 PROCEDURE — 25000128 H RX IP 250 OP 636: Mod: ZF | Performed by: PEDIATRICS

## 2020-06-09 PROCEDURE — 85025 COMPLETE CBC W/AUTO DIFF WBC: CPT | Performed by: PEDIATRICS

## 2020-06-09 RX ORDER — ALLOPURINOL 100 MG/1
100 TABLET ORAL DAILY
Qty: 30 TABLET | Refills: 11 | Status: SHIPPED | OUTPATIENT
Start: 2020-06-09 | End: 2020-07-15

## 2020-06-09 RX ORDER — HEPARIN SODIUM (PORCINE) LOCK FLUSH IV SOLN 100 UNIT/ML 100 UNIT/ML
5 SOLUTION INTRAVENOUS
Status: DISCONTINUED | OUTPATIENT
Start: 2020-06-09 | End: 2020-06-09 | Stop reason: HOSPADM

## 2020-06-09 RX ORDER — HEPARIN SODIUM (PORCINE) LOCK FLUSH IV SOLN 100 UNIT/ML 100 UNIT/ML
SOLUTION INTRAVENOUS
Status: COMPLETED
Start: 2020-06-09 | End: 2020-06-09

## 2020-06-09 RX ORDER — LORAZEPAM 2 MG/ML
1 INJECTION INTRAMUSCULAR ONCE
Status: COMPLETED | OUTPATIENT
Start: 2020-06-09 | End: 2020-06-09

## 2020-06-09 RX ORDER — MERCAPTOPURINE 50 MG/1
TABLET ORAL
Qty: 26 TABLET | Refills: 2 | Status: ON HOLD | OUTPATIENT
Start: 2020-06-09 | End: 2020-08-06

## 2020-06-09 RX ORDER — LORAZEPAM 1 MG/1
TABLET ORAL
Qty: 10 TABLET | Refills: 0 | Status: ON HOLD | OUTPATIENT
Start: 2020-06-09 | End: 2020-10-06

## 2020-06-09 RX ORDER — LORAZEPAM 2 MG/ML
INJECTION INTRAMUSCULAR
Status: COMPLETED
Start: 2020-06-09 | End: 2020-06-09

## 2020-06-09 RX ADMIN — LORAZEPAM 1 MG: 2 INJECTION INTRAMUSCULAR at 12:26

## 2020-06-09 RX ADMIN — VINCRISTINE SULFATE 2 MG: 1 INJECTION, SOLUTION INTRAVENOUS at 12:44

## 2020-06-09 RX ADMIN — LORAZEPAM 1 MG: 2 INJECTION INTRAMUSCULAR; INTRAVENOUS at 12:26

## 2020-06-09 RX ADMIN — SODIUM CHLORIDE 50 ML: 9 INJECTION, SOLUTION INTRAVENOUS at 12:39

## 2020-06-09 RX ADMIN — HEPARIN SODIUM (PORCINE) LOCK FLUSH IV SOLN 100 UNIT/ML 5 ML: 100 SOLUTION at 12:45

## 2020-06-09 RX ADMIN — HEPARIN 5 ML: 100 SYRINGE at 12:45

## 2020-06-09 ASSESSMENT — MIFFLIN-ST. JEOR: SCORE: 1900.38

## 2020-06-09 ASSESSMENT — PAIN SCALES - GENERAL: PAINLEVEL: NO PAIN (0)

## 2020-06-09 NOTE — PROGRESS NOTES
Infusion Nursing Note    Lazaro Lund Presents to [facility] today for: Vincristine    Due to : T lymphoblastic lymphoma (H)    Intravenous Access/Labs: Single port accessed using sterile technique without issue.  Positive blood return noted and labs drawn as ordered.    Infusion Note: Pt. Denies any fevers/infections.  Pt. Seen and assessed by Luana PENALOZA NP.  Pt. Denies nausea upon arrival, but as soon as chemotherapy was brought into room patient had 3 emesis episodes.  Luana notified and IV Ativan given.  Vincristine given by gravity without issue.  Positive blood return noted pre, mid, and post-infusion.  Port was flushed, heparin locked, and de-accessed.      Discharge Plan:   Patient verbalized understanding of discharge instructions.  Pt left [facility] in stable condition at conclusion of appt.  Mother is patient's ride home.

## 2020-06-09 NOTE — LETTER
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 Lazaro presented with acute onset cough and was found to have an anterior mediastinal mass and malignant left-sided pleural effusion.  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  He was admitted to [facility] oncology service 8/30 and started on treatment per [id number].  He had a pleural effusion that required a chest tube and a pericardial effusion that was not drained. His Induction was complicated by cardiac compression secondary to his mass leading to tamponade, this improved through out his hospital stay.  He also had dysphagia that improved with treatment of his mass, swallow study was normal. His course was recently complicated by extensive bilateral UE DVTs, he remains on anticoagulation.  Most recently his course was complicated by the development of severe asparaginase induced pancreatitis. He became quite ill with SIRS and associated hypotension, he required pressor support in the ICU.  Fortunately Lazaro recovered well and his subsequent imaging has continued to show improvement.  He comes to clinic today for Day 57 of 1st Maintenance cycle.    HPI:   At Lazaro's D29 visit he had significant nausea that lead to weight loss.  He was having a hard time eating, especially in the morning.  His TGNs recently came back showing an elevated 6MMP level.    Since his last visit, Lazaro reports his morning nausea continues.   He vomits at least every AM.  Usually as the day goes on he feels better, he continues to smoke marijuana. His appetite is better later in the day.  Energy level is OK.  No constipation or diarrhea.  No abdominal pain.  No skin concerns.  Lazaro denies pain.  No paresthesias.  Mood is good.    Lazaro has developed vomiting  while in clinic, prior to any medications.  He doesn't feel overly anxious but notes this is becoming quite a pattern even when he is feeling well prior to coming to clinic.    Review of systems:  Pertinent positives reported in the HPI. All other systems in a complete and comprehensive review of systems were negative..    PMH:   Past Medical History:   Diagnosis Date     Acute necrotizing pancreatitis 11/07/2019    attributed to asparaginase     Acute pancreatitis due to PEGaspariginase therapy  11/15/2019     DVT of upper extremity (deep vein thrombosis) (H) 09/26/2019    Bilateral      Edema of upper extremity 10/17/2019     Folliculitis 10/17/2019     Migraine 2006    have resolved     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   -- Spinal HA following LP  -- TPMT shows Intermediate activity with normal TPMT/NUDT15 genotype  -- Factor V leiden and prothrombin gene mutation negative  -- Necrotizing pancreatitis secondary to asparaginase  -former smoke, quit with the use of nicotine patches.    PFMH:   Family History   Problem Relation Age of Onset     No Known Problems Mother      No Known Problems Father      Asthma Brother      Thyroid Cancer Paternal Grandmother      Melanoma Paternal Aunt      Social History: Lazaro previously lived at home with his dad and step mom in [place] but is now staying with his grandma in [place].  He has stopped working since [place] restriction went into place.  He has been enjoying fishing.    Current Medications:  Current Outpatient Medications   Medication Sig Dispense Refill     allopurinol (ZYLOPRIM) 100 MG tablet Take 1 tablet (100 mg) by mouth daily 30 tablet 11     mercaptopurine (PURINETHOL) 50 MG tablet Take 1 tablet (50mg) four days per week and 1/2 tablet (25mg) three days/week. 26 tablet 2     methotrexate 2.5 MG tablet Take 17 tablets (42.5 mg) by mouth once a week Do not take on Days of LP. 68 tablet 2     diphenhydrAMINE (BENADRYL) 25 MG capsule Take 1-2 capsules  "(25-50 mg) by mouth every 6 hours as needed (Breakthrough Nausea and Vomiting ) 30 capsule 1     lidocaine-prilocaine (EMLA) 2.5-2.5 % external cream Apply topically as needed for other (prior to port access) 30 g 6     ondansetron (ZOFRAN-ODT) 8 MG ODT tab Take 1 tablet (8 mg) by mouth every 8 hours as needed for nausea 20 tablet 6     pantoprazole (PROTONIX) 40 MG EC tablet Take 1 tablet (40 mg) by mouth every morning (before breakfast) 30 tablet 2     polyethylene glycol (MIRALAX/GLYCOLAX) packet Take 17 g by mouth daily 30 packet 0     predniSONE (DELTASONE) 10 MG tablet Take 45mg (4.5 tabs) in the AM and 40mg (4 tabs) in the PM for 5 days 43 tablet 2     scopolamine (TRANSDERM) 1 MG/3DAYS 72 hr patch Place 1 patch onto the skin every 72 hours 10 patch 3     scopolamine (TRANSDERM) 1 MG/3DAYS 72 hr patch Place 1 patch onto the skin every 72 hours 10 patch 3     sennosides (SENOKOT) 8.6 MG tablet Take 2 tablets by mouth 2 times daily as needed for constipation 60 each 1     sulfamethoxazole-trimethoprim (BACTRIM DS) 800-160 MG tablet Take 1 tablet by mouth Every Mon, Tues two times daily 16 tablet 11     Vitamin D, Cholecalciferol, 25 MCG (1000 UT) TABS Take 2 tablets (2,000 Units) by mouth daily 60 tablet 3   Current dose 6MP is 150mg x 4 days, 175mg x 3 days.    Reviewed medications with Lazaro, he has not missed any doses of chemotherapy.    Received influenza vaccine for the 9469-3254 season.      Physical Exam:   Temp:  [98.3  F (36.8  C)] 98.3  F (36.8  C)  Pulse:  [76] 76  Resp:  [16] 16  BP: (143)/(75) 143/75  SpO2:  [99 %] 99 %     Wt Readings from Last 4 Encounters:   06/09/20 85.1 kg (187 lb 9.8 oz)   05/28/20 86.2 kg (190 lb 0.6 oz)   05/14/20 83.9 kg (184 lb 15.5 oz)   04/23/20 87.4 kg (192 lb 10.9 oz)     Ht Readings from Last 2 Encounters:   06/09/20 1.847 m (6' 0.72\")   05/28/20 1.843 m (6' 0.56\")     General: Lazaro is alert, interactive and appropriate. He is nauseous and vomits multiple times " during the visit.  HEENT: Skull is atrauamatic and normocephalic.  Full head of hair. PERRLA, sclera are non icteric and not injected, EOM are intact, gaze slightly disconjugate which is his baseline. Nares are patent without drainage. Oropharynx clear, no plaques noted. Buccal mucosa and tongue clear. MMM.  Neck:  Supple without lymphadenopathy.   Lymph: There is no cervical, supraclavicular, axillary, lymphadenopathy palpated.  Cardiovascular:  HR is regular, S1, S2 no murmur.  Capillary refill is < 2 seconds. Right arm without edema or erythema.  No pitting edema.  Cap refill < 2 sec. Peripheral pulses 2+/=. He has mildly distended veins noted on the left upper chest, not extending up to the neck, overlying the pectoralis.   Respiratory: No cough noted. Respirations are easy.  Lungs are clear to auscultation throughout.  No crackles or wheezes.  Gastrointestinal:  BS present in all quadrants.  Abdomen is soft and flat.  Lazaro denies LUQ discomfort, no pain with palpation. No hepatosplenomegaly or masses are palpated.  Skin: Few scattered acne lesions on his lower back.  No surrounding erythema, no drainage. No other skin lesions noted.  Neurological:  CN 2-12 grossly intact. Gait is normal.  No issues with balance. Sensation intact in hands and feet.     Musculoskeletal:  Good strength and ROM in all extremities.  Strong dorsiflexion at ankles and great toes (5/5) bilaterally without any pain at the Achilles.    Labs:   Results for orders placed or performed in visit on 06/09/20   CBC with platelets differential     Status: Abnormal   Result Value Ref Range    WBC 1.8 (L) 4.0 - 11.0 10e9/L    RBC Count 3.12 (L) 4.4 - 5.9 10e12/L    Hemoglobin 10.7 (L) 13.3 - 17.7 g/dL    Hematocrit 31.5 (L) 40.0 - 53.0 %     (H) 78 - 100 fl    MCH 34.3 (H) 26.5 - 33.0 pg    MCHC 34.0 31.5 - 36.5 g/dL    RDW 17.2 (H) 10.0 - 15.0 %    Platelet Count 198 150 - 450 10e9/L    Diff Method Automated Method     % Neutrophils 67.4  %    % Lymphocytes 22.1 %    % Monocytes 6.6 %    % Eosinophils 3.3 %    % Basophils 0.6 %    % Immature Granulocytes 0.0 %    Nucleated RBCs 0 0 /100    Absolute Neutrophil 1.2 (L) 1.6 - 8.3 10e9/L    Absolute Lymphocytes 0.4 (L) 0.8 - 5.3 10e9/L    Absolute Monocytes 0.1 0.0 - 1.3 10e9/L    Absolute Eosinophils 0.1 0.0 - 0.7 10e9/L    Absolute Basophils 0.0 0.0 - 0.2 10e9/L    Abs Immature Granulocytes 0.0 0 - 0.4 10e9/L    Absolute Nucleated RBC 0.0    TGNs from 5/28:  6MMP 17,761  6TG 906    Assessment:  Lazaro Lund is a 20 year old young man with T cell lymphoblastic lymphoma (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  He is being treated per [facility]G protocol [id number].   He's had a CRu following Induction with a CR at the end of Consolidation. His course has been complicated by extensive bilateral DVT and severe necrotizing pancreatitis.  He completed treatment with lovenox. Recent abdominal CT shows continued improvement in regards to the sequelae from his pancreatitis.  Asparaginase was permanently discontinued from his treatment regimen. He is on Vit D for deficiency.  He comes to clinic today for Day 57 Cycle 1 of Maintenance therapy.  He had a significant increase in nausea since starting Maintenance that lead to weight loss.  His 6MMP level is persistent elevated which is likely the cause of his increased nausea. His blood counts look good, ANC above target range.  He has developed anticipatory nausea and vomiting.    Plan:   1) Reviewed labs with Lazaro, given his persistently elevated 6MMP I would like to proceed with modifying his metabolism with allopurinol. Hold 6MP and methotrexate for wash out period through Sunday.  On Monday start allopurinol along with reduced dose 6MP to 25%= 50mg x 4 days, 25mg x 3 days.  Will then restart at full dose methotrexate at 17 tabs.   2) Continue protonix.  3) Continue to monitor for new symptoms of thrombosis while off of lovenox. Low threshold to repeat U/S if any signs of  thrombus.   4) Lazaro's most recent CT ordered by Dr Coronel looks good and he not longer requires additional imaging or follow up with him.  5) Continue Vit D 3 2000 IU daily (last level was 19 on 3/5/20).  Recheck level in August.  6) Day 29 Induction LP deferred, plan to make-up dose in 5th cycle of Maintenance therapy.  7) Given significant spinal headache history will plan for IV caffeine following LPs in the future and to use Slava needle when possible.  8) Lazaro is doing well with smoking cessation, continue to follow. Have addressed marijuana use and associated risks.  9) Plan to check CMP and TGNs monthly moving forward.  10) Trial of ativan 1mg prior to clinic visits for anticipatory vomiting, discussed with Lazaro that he cannot drive while taking this medication.  11) RTC in 4 weeks for D1 of Cycle 2, asked Lazaro to call in the interim if nausea does not improve.      YOHAN Dunn CNP

## 2020-06-09 NOTE — PROGRESS NOTES
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 Lazaro presented with acute onset cough and was found to have an anterior mediastinal mass and malignant left-sided pleural effusion.  A CT guided biopsy was obtained at [facility] and pathology was consistent with T-cell leukemia vs lymphoma.  He was admitted to [facility] oncology service 8/30 and started on treatment per [id number].  He had a pleural effusion that required a chest tube and a pericardial effusion that was not drained. His Induction was complicated by cardiac compression secondary to his mass leading to tamponade, this improved through out his hospital stay.  He also had dysphagia that improved with treatment of his mass, swallow study was normal. His course was recently complicated by extensive bilateral UE DVTs, he remains on anticoagulation.  Most recently his course was complicated by the development of severe asparaginase induced pancreatitis. He became quite ill with SIRS and associated hypotension, he required pressor support in the ICU.  Fortunately Lazaro recovered well and his subsequent imaging has continued to show improvement.  He comes to clinic today for Day 57 of 1st Maintenance cycle.    HPI:   At Lazaro's D29 visit he had significant nausea that lead to weight loss.  He was having a hard time eating, especially in the morning.  His TGNs recently came back showing an elevated 6MMP level.    Since his last visit, Lazaro reports his morning nausea continues.   He vomits at least every AM.  Usually as the day goes on he feels better, he continues to smoke marijuana. His appetite is better later in the day.  Energy level is OK.  No constipation or diarrhea.  No abdominal pain.  No skin concerns.  Lazaro denies pain.  No paresthesias.  Mood is good.    Lazaro has developed vomiting while in clinic, prior to any medications.  He doesn't feel overly anxious but notes this  is becoming quite a pattern even when he is feeling well prior to coming to clinic.    Review of systems:  Pertinent positives reported in the HPI. All other systems in a complete and comprehensive review of systems were negative..    PMH:   Past Medical History:   Diagnosis Date     Acute necrotizing pancreatitis 11/07/2019    attributed to asparaginase     Acute pancreatitis due to PEGaspariginase therapy  11/15/2019     DVT of upper extremity (deep vein thrombosis) (H) 09/26/2019    Bilateral      Edema of upper extremity 10/17/2019     Folliculitis 10/17/2019     Migraine 2006    have resolved     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   -- Spinal HA following LP  -- TPMT shows Intermediate activity with normal TPMT/NUDT15 genotype  -- Factor V leiden and prothrombin gene mutation negative  -- Necrotizing pancreatitis secondary to asparaginase  -former smoke, quit with the use of nicotine patches.    PFMH:   Family History   Problem Relation Age of Onset     No Known Problems Mother      No Known Problems Father      Asthma Brother      Thyroid Cancer Paternal Grandmother      Melanoma Paternal Aunt      Social History: Lazaro previously lived at home with his dad and step mom in [place] but is now staying with his grandma in [place].  He has stopped working since [place] restriction went into place.  He has been enjoying fishing.    Current Medications:  Current Outpatient Medications   Medication Sig Dispense Refill     allopurinol (ZYLOPRIM) 100 MG tablet Take 1 tablet (100 mg) by mouth daily 30 tablet 11     mercaptopurine (PURINETHOL) 50 MG tablet Take 1 tablet (50mg) four days per week and 1/2 tablet (25mg) three days/week. 26 tablet 2     methotrexate 2.5 MG tablet Take 17 tablets (42.5 mg) by mouth once a week Do not take on Days of LP. 68 tablet 2     diphenhydrAMINE (BENADRYL) 25 MG capsule Take 1-2 capsules (25-50 mg) by mouth every 6 hours as needed (Breakthrough Nausea and Vomiting ) 30 capsule  "1     lidocaine-prilocaine (EMLA) 2.5-2.5 % external cream Apply topically as needed for other (prior to port access) 30 g 6     ondansetron (ZOFRAN-ODT) 8 MG ODT tab Take 1 tablet (8 mg) by mouth every 8 hours as needed for nausea 20 tablet 6     pantoprazole (PROTONIX) 40 MG EC tablet Take 1 tablet (40 mg) by mouth every morning (before breakfast) 30 tablet 2     polyethylene glycol (MIRALAX/GLYCOLAX) packet Take 17 g by mouth daily 30 packet 0     predniSONE (DELTASONE) 10 MG tablet Take 45mg (4.5 tabs) in the AM and 40mg (4 tabs) in the PM for 5 days 43 tablet 2     scopolamine (TRANSDERM) 1 MG/3DAYS 72 hr patch Place 1 patch onto the skin every 72 hours 10 patch 3     scopolamine (TRANSDERM) 1 MG/3DAYS 72 hr patch Place 1 patch onto the skin every 72 hours 10 patch 3     sennosides (SENOKOT) 8.6 MG tablet Take 2 tablets by mouth 2 times daily as needed for constipation 60 each 1     sulfamethoxazole-trimethoprim (BACTRIM DS) 800-160 MG tablet Take 1 tablet by mouth Every Mon, Tues two times daily 16 tablet 11     Vitamin D, Cholecalciferol, 25 MCG (1000 UT) TABS Take 2 tablets (2,000 Units) by mouth daily 60 tablet 3   Current dose 6MP is 150mg x 4 days, 175mg x 3 days.    Reviewed medications with Lazaro, he has not missed any doses of chemotherapy.    Received influenza vaccine for the 2679-1288 season.      Physical Exam:   Temp:  [98.3  F (36.8  C)] 98.3  F (36.8  C)  Pulse:  [76] 76  Resp:  [16] 16  BP: (143)/(75) 143/75  SpO2:  [99 %] 99 %     Wt Readings from Last 4 Encounters:   06/09/20 85.1 kg (187 lb 9.8 oz)   05/28/20 86.2 kg (190 lb 0.6 oz)   05/14/20 83.9 kg (184 lb 15.5 oz)   04/23/20 87.4 kg (192 lb 10.9 oz)     Ht Readings from Last 2 Encounters:   06/09/20 1.847 m (6' 0.72\")   05/28/20 1.843 m (6' 0.56\")     General: Lazaro is alert, interactive and appropriate. He is nauseous and vomits multiple times during the visit.  HEENT: Skull is atrauamatic and normocephalic.  Full head of hair. " PERRLA, sclera are non icteric and not injected, EOM are intact, gaze slightly disconjugate which is his baseline. Nares are patent without drainage. Oropharynx clear, no plaques noted. Buccal mucosa and tongue clear. MMM.  Neck:  Supple without lymphadenopathy.   Lymph: There is no cervical, supraclavicular, axillary, lymphadenopathy palpated.  Cardiovascular:  HR is regular, S1, S2 no murmur.  Capillary refill is < 2 seconds. Right arm without edema or erythema.  No pitting edema.  Cap refill < 2 sec. Peripheral pulses 2+/=. He has mildly distended veins noted on the left upper chest, not extending up to the neck, overlying the pectoralis.   Respiratory: No cough noted. Respirations are easy.  Lungs are clear to auscultation throughout.  No crackles or wheezes.  Gastrointestinal:  BS present in all quadrants.  Abdomen is soft and flat.  Lazaro denies LUQ discomfort, no pain with palpation. No hepatosplenomegaly or masses are palpated.  Skin: Few scattered acne lesions on his lower back.  No surrounding erythema, no drainage. No other skin lesions noted.  Neurological:  CN 2-12 grossly intact. Gait is normal.  No issues with balance. Sensation intact in hands and feet.     Musculoskeletal:  Good strength and ROM in all extremities.  Strong dorsiflexion at ankles and great toes (5/5) bilaterally without any pain at the Achilles.    Labs:   Results for orders placed or performed in visit on 06/09/20   CBC with platelets differential     Status: Abnormal   Result Value Ref Range    WBC 1.8 (L) 4.0 - 11.0 10e9/L    RBC Count 3.12 (L) 4.4 - 5.9 10e12/L    Hemoglobin 10.7 (L) 13.3 - 17.7 g/dL    Hematocrit 31.5 (L) 40.0 - 53.0 %     (H) 78 - 100 fl    MCH 34.3 (H) 26.5 - 33.0 pg    MCHC 34.0 31.5 - 36.5 g/dL    RDW 17.2 (H) 10.0 - 15.0 %    Platelet Count 198 150 - 450 10e9/L    Diff Method Automated Method     % Neutrophils 67.4 %    % Lymphocytes 22.1 %    % Monocytes 6.6 %    % Eosinophils 3.3 %    % Basophils  0.6 %    % Immature Granulocytes 0.0 %    Nucleated RBCs 0 0 /100    Absolute Neutrophil 1.2 (L) 1.6 - 8.3 10e9/L    Absolute Lymphocytes 0.4 (L) 0.8 - 5.3 10e9/L    Absolute Monocytes 0.1 0.0 - 1.3 10e9/L    Absolute Eosinophils 0.1 0.0 - 0.7 10e9/L    Absolute Basophils 0.0 0.0 - 0.2 10e9/L    Abs Immature Granulocytes 0.0 0 - 0.4 10e9/L    Absolute Nucleated RBC 0.0    TGNs from 5/28:  6MMP 17,761  6TG 906    Assessment:  Lazaro Lund is a 20 year old young man with T cell lymphoblastic lymphoma (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  He is being treated per [facility]G protocol [id number].   He's had a CRu following Induction with a CR at the end of Consolidation. His course has been complicated by extensive bilateral DVT and severe necrotizing pancreatitis.  He completed treatment with lovenox. Recent abdominal CT shows continued improvement in regards to the sequelae from his pancreatitis.  Asparaginase was permanently discontinued from his treatment regimen. He is on Vit D for deficiency.  He comes to clinic today for Day 57 Cycle 1 of Maintenance therapy.  He had a significant increase in nausea since starting Maintenance that lead to weight loss.  His 6MMP level is persistent elevated which is likely the cause of his increased nausea. His blood counts look good, ANC above target range.  He has developed anticipatory nausea and vomiting.    Plan:   1) Reviewed labs with Lazaro, given his persistently elevated 6MMP I would like to proceed with modifying his metabolism with allopurinol. Hold 6MP and methotrexate for wash out period through Sunday.  On Monday start allopurinol along with reduced dose 6MP to 25%= 50mg x 4 days, 25mg x 3 days.  Will then restart at full dose methotrexate at 17 tabs.   2) Continue protonix.  3) Continue to monitor for new symptoms of thrombosis while off of lovenox. Low threshold to repeat U/S if any signs of thrombus.   4) Lazaro's most recent CT ordered by Dr Coronel looks good and he not longer  requires additional imaging or follow up with him.  5) Continue Vit D 3 2000 IU daily (last level was 19 on 3/5/20).  Recheck level in August.  6) Day 29 Induction LP deferred, plan to make-up dose in 5th cycle of Maintenance therapy.  7) Given significant spinal headache history will plan for IV caffeine following LPs in the future and to use Slava needle when possible.  8) Lazaro is doing well with smoking cessation, continue to follow. Have addressed marijuana use and associated risks.  9) Plan to check CMP and TGNs monthly moving forward.  10) Trial of ativan 1mg prior to clinic visits for anticipatory vomiting, discussed with Lazaro that he cannot drive while taking this medication.  11) RTC in 4 weeks for D1 of Cycle 2, asked Lazaro to call in the interim if nausea does not improve.

## 2020-06-10 DIAGNOSIS — Z11.59 ENCOUNTER FOR SCREENING FOR OTHER VIRAL DISEASES: Primary | ICD-10-CM

## 2020-06-10 RX ORDER — EPINEPHRINE 1 MG/ML
0.3 INJECTION, SOLUTION, CONCENTRATE INTRAVENOUS EVERY 5 MIN PRN
Status: CANCELLED | OUTPATIENT
Start: 2020-08-06

## 2020-06-10 RX ORDER — CAFFEINE AND SODIUM BENZOATE 125 MG/ML
500 INJECTION, SOLUTION INTRAMUSCULAR; INTRAVENOUS ONCE
Status: CANCELLED
Start: 2020-07-09

## 2020-06-10 RX ORDER — ALBUTEROL SULFATE 0.83 MG/ML
2.5 SOLUTION RESPIRATORY (INHALATION)
Status: CANCELLED | OUTPATIENT
Start: 2020-09-03

## 2020-06-10 RX ORDER — EPINEPHRINE 1 MG/ML
0.3 INJECTION, SOLUTION, CONCENTRATE INTRAVENOUS EVERY 5 MIN PRN
Status: CANCELLED | OUTPATIENT
Start: 2020-09-03

## 2020-06-10 RX ORDER — METHYLPREDNISOLONE SODIUM SUCCINATE 125 MG/2ML
125 INJECTION, POWDER, LYOPHILIZED, FOR SOLUTION INTRAMUSCULAR; INTRAVENOUS
Status: CANCELLED | OUTPATIENT
Start: 2020-09-03

## 2020-06-10 RX ORDER — EPINEPHRINE 1 MG/ML
0.3 INJECTION, SOLUTION, CONCENTRATE INTRAVENOUS EVERY 5 MIN PRN
Status: CANCELLED | OUTPATIENT
Start: 2020-07-09

## 2020-06-10 RX ORDER — SODIUM CHLORIDE 9 MG/ML
200 INJECTION, SOLUTION INTRAVENOUS CONTINUOUS PRN
Status: CANCELLED | OUTPATIENT
Start: 2020-08-06

## 2020-06-10 RX ORDER — ALBUTEROL SULFATE 90 UG/1
1-2 AEROSOL, METERED RESPIRATORY (INHALATION)
Status: CANCELLED
Start: 2020-09-03

## 2020-06-10 RX ORDER — ALBUTEROL SULFATE 0.83 MG/ML
2.5 SOLUTION RESPIRATORY (INHALATION)
Status: CANCELLED | OUTPATIENT
Start: 2020-07-09

## 2020-06-10 RX ORDER — ALBUTEROL SULFATE 90 UG/1
1-2 AEROSOL, METERED RESPIRATORY (INHALATION)
Status: CANCELLED
Start: 2020-07-09

## 2020-06-10 RX ORDER — DIPHENHYDRAMINE HYDROCHLORIDE 50 MG/ML
50 INJECTION INTRAMUSCULAR; INTRAVENOUS
Status: CANCELLED
Start: 2020-07-09

## 2020-06-10 RX ORDER — ALBUTEROL SULFATE 0.83 MG/ML
2.5 SOLUTION RESPIRATORY (INHALATION)
Status: CANCELLED | OUTPATIENT
Start: 2020-08-06

## 2020-06-10 RX ORDER — METHYLPREDNISOLONE SODIUM SUCCINATE 125 MG/2ML
125 INJECTION, POWDER, LYOPHILIZED, FOR SOLUTION INTRAMUSCULAR; INTRAVENOUS
Status: CANCELLED | OUTPATIENT
Start: 2020-07-09

## 2020-06-10 RX ORDER — DIPHENHYDRAMINE HYDROCHLORIDE 50 MG/ML
50 INJECTION INTRAMUSCULAR; INTRAVENOUS
Status: CANCELLED
Start: 2020-09-03

## 2020-06-10 RX ORDER — METHYLPREDNISOLONE SODIUM SUCCINATE 125 MG/2ML
125 INJECTION, POWDER, LYOPHILIZED, FOR SOLUTION INTRAMUSCULAR; INTRAVENOUS
Status: CANCELLED | OUTPATIENT
Start: 2020-08-06

## 2020-06-10 RX ORDER — SODIUM CHLORIDE 9 MG/ML
200 INJECTION, SOLUTION INTRAVENOUS CONTINUOUS PRN
Status: CANCELLED | OUTPATIENT
Start: 2020-07-09

## 2020-06-10 RX ORDER — SODIUM CHLORIDE 9 MG/ML
200 INJECTION, SOLUTION INTRAVENOUS CONTINUOUS PRN
Status: CANCELLED | OUTPATIENT
Start: 2020-09-03

## 2020-06-10 RX ORDER — ALBUTEROL SULFATE 90 UG/1
1-2 AEROSOL, METERED RESPIRATORY (INHALATION)
Status: CANCELLED
Start: 2020-08-06

## 2020-06-10 RX ORDER — DIPHENHYDRAMINE HYDROCHLORIDE 50 MG/ML
50 INJECTION INTRAMUSCULAR; INTRAVENOUS
Status: CANCELLED
Start: 2020-08-06

## 2020-06-10 RX ORDER — CAFFEINE AND SODIUM BENZOATE 125 MG/ML
500 INJECTION, SOLUTION INTRAMUSCULAR; INTRAVENOUS ONCE
Status: CANCELLED
Start: 2020-08-06

## 2020-06-24 ENCOUNTER — DOCUMENTATION ONLY (OUTPATIENT)
Dept: PEDIATRIC HEMATOLOGY/ONCOLOGY | Facility: CLINIC | Age: 20
End: 2020-06-24

## 2020-06-24 NOTE — PROGRESS NOTES
HONEY received e-mail from [facility] with a message from Lazaro requesting to be connected with a therapist. NP requested SW follow-up with Lazaro to provide resources for him to connect with. HONEY e-mailed Lazaro to inquire about his location preferences and instructing him to seek immediate help should he begin to have thoughts of suicide, self-harm, or should his symptoms become worse. Lazaro described having feelings of sadness for 4 days at a time and then feeling great for several weeks. He noted having anxiety and bad headaches during his school years. He was scared he had a brain tumor as one of his classmates was being treated for a brain tumor. He has never undergone a formal mental health diagnostic assessment, nor has he had any therapy. He expressed interest in trying therapy prior to considering medication. He did endorse use of marijuana to try and manage his feelings. HONEY provided Lazaro the following counseling resources. While they are in the general area around [place] there were not any that were directly located in [place]. E-mail copy/pasted below.     Marv Willis,     Thanks for your reply. It sounds like your classmates cancer was really scary and anxiety provoking for you as a young child. Now, as an adult, you re trying to manage your own cancer, maintain a sense of normalcy, come to appointments, and all in the midst of a pandemic. It can be overwhelming trying to manage everything. In looking for therapists in your area I found a couple in [facility], and [place]. I know these are not as close as you d prefer but with Covid, most therapy centers are doing virtual visits versus in-person (at least for right now). Do you still have Medical Assistance? These places accept [facility] and [facility] (which is what I believe you had before MA).     [facility]   [address], [address]  [place], [place] [address] [phone]    [facility]   [address], [address]  [place] [address]   [phone]    [facility] [place], [place]   [phone]     [facility]   [facility]  [phone]    Some places offer a free telephone consultation to discuss what it is you re hoping to process/work through in therapy. You might want to ask if they would be willing to hear a bit about your concerns to see if they can support your therapeutic goals.     Please let me know if you have any questions and let me know if you re able to connect with one.     HONEY will follow-up with Lazaro next week to check-in. Lazaro has SW contact information should any immediate needs/concerns arise. He is aware of what to do in the case of a mental health emergency. Social work will continue to assess needs and provide ongoing psychosocial support and access to resources.      CHEY Davis, LICSW, OSW-C  Clinical    Pediatric Hematology Oncology   [facility]   Monday-Thursday   Phone: [phone]  Pager: [phone]    NO LETTER

## 2020-07-09 ENCOUNTER — OFFICE VISIT (OUTPATIENT)
Dept: PEDIATRIC HEMATOLOGY/ONCOLOGY | Facility: CLINIC | Age: 20
End: 2020-07-09
Attending: PEDIATRICS
Payer: COMMERCIAL

## 2020-07-09 ENCOUNTER — INFUSION THERAPY VISIT (OUTPATIENT)
Dept: INFUSION THERAPY | Facility: CLINIC | Age: 20
End: 2020-07-09
Attending: PEDIATRICS
Payer: COMMERCIAL

## 2020-07-09 ENCOUNTER — HOSPITAL ENCOUNTER (OUTPATIENT)
Facility: CLINIC | Age: 20
Discharge: HOME | End: 2020-07-09
Attending: PEDIATRICS | Admitting: PEDIATRICS
Payer: COMMERCIAL

## 2020-07-09 ENCOUNTER — ANESTHESIA EVENT (OUTPATIENT)
Dept: PEDIATRICS | Facility: CLINIC | Age: 20
End: 2020-07-09
Payer: COMMERCIAL

## 2020-07-09 ENCOUNTER — ANESTHESIA (OUTPATIENT)
Dept: PEDIATRICS | Facility: CLINIC | Age: 20
End: 2020-07-09
Payer: COMMERCIAL

## 2020-07-09 VITALS
OXYGEN SATURATION: 99 % | TEMPERATURE: 98.1 F | HEART RATE: 78 BPM | SYSTOLIC BLOOD PRESSURE: 134 MMHG | DIASTOLIC BLOOD PRESSURE: 61 MMHG | RESPIRATION RATE: 16 BRPM

## 2020-07-09 VITALS
OXYGEN SATURATION: 98 % | BODY MASS INDEX: 24.57 KG/M2 | HEIGHT: 73 IN | TEMPERATURE: 97.7 F | RESPIRATION RATE: 16 BRPM | WEIGHT: 185.41 LBS | SYSTOLIC BLOOD PRESSURE: 93 MMHG | DIASTOLIC BLOOD PRESSURE: 70 MMHG | HEART RATE: 63 BPM

## 2020-07-09 DIAGNOSIS — C83.50 T LYMPHOBLASTIC LYMPHOMA (H): Primary | ICD-10-CM

## 2020-07-09 DIAGNOSIS — Z51.11 ENCOUNTER FOR ANTINEOPLASTIC CHEMOTHERAPY: ICD-10-CM

## 2020-07-09 LAB
ALBUMIN SERPL-MCNC: 4 G/DL (ref 3.4–5)
ALP SERPL-CCNC: 64 U/L (ref 40–150)
ALT SERPL W P-5'-P-CCNC: 25 U/L (ref 0–70)
ANION GAP SERPL CALCULATED.3IONS-SCNC: 4 MMOL/L (ref 3–14)
AST SERPL W P-5'-P-CCNC: 19 U/L (ref 0–45)
BASOPHILS # BLD AUTO: 0 10E9/L (ref 0–0.2)
BASOPHILS NFR BLD AUTO: 0.3 %
BILIRUB SERPL-MCNC: 0.4 MG/DL (ref 0.2–1.3)
BUN SERPL-MCNC: 10 MG/DL (ref 7–30)
CALCIUM SERPL-MCNC: 8.6 MG/DL (ref 8.5–10.1)
CHLORIDE SERPL-SCNC: 110 MMOL/L (ref 94–109)
CO2 SERPL-SCNC: 29 MMOL/L (ref 20–32)
CREAT SERPL-MCNC: 0.7 MG/DL (ref 0.66–1.25)
DIFFERENTIAL METHOD BLD: ABNORMAL
EOSINOPHIL # BLD AUTO: 0 10E9/L (ref 0–0.7)
EOSINOPHIL NFR BLD AUTO: 0.8 %
ERYTHROCYTE [DISTWIDTH] IN BLOOD BY AUTOMATED COUNT: 14.6 % (ref 10–15)
GFR SERPL CREATININE-BSD FRML MDRD: >90 ML/MIN/{1.73_M2}
GLUCOSE SERPL-MCNC: 103 MG/DL (ref 70–99)
HCT VFR BLD AUTO: 37.6 % (ref 40–53)
HGB BLD-MCNC: 12.9 G/DL (ref 13.3–17.7)
IMM GRANULOCYTES # BLD: 0 10E9/L (ref 0–0.4)
IMM GRANULOCYTES NFR BLD: 0 %
LYMPHOCYTES # BLD AUTO: 0.4 10E9/L (ref 0.8–5.3)
LYMPHOCYTES NFR BLD AUTO: 9.8 %
MCH RBC QN AUTO: 35.2 PG (ref 26.5–33)
MCHC RBC AUTO-ENTMCNC: 34.3 G/DL (ref 31.5–36.5)
MCV RBC AUTO: 103 FL (ref 78–100)
MONOCYTES # BLD AUTO: 0.2 10E9/L (ref 0–1.3)
MONOCYTES NFR BLD AUTO: 6.4 %
NEUTROPHILS # BLD AUTO: 3 10E9/L (ref 1.6–8.3)
NEUTROPHILS NFR BLD AUTO: 82.7 %
NRBC # BLD AUTO: 0 10*3/UL
NRBC BLD AUTO-RTO: 0 /100
PLATELET # BLD AUTO: 147 10E9/L (ref 150–450)
POTASSIUM SERPL-SCNC: 3.7 MMOL/L (ref 3.4–5.3)
PROT SERPL-MCNC: 6.4 G/DL (ref 6.8–8.8)
RBC # BLD AUTO: 3.66 10E12/L (ref 4.4–5.9)
SODIUM SERPL-SCNC: 142 MMOL/L (ref 133–144)
WBC # BLD AUTO: 3.6 10E9/L (ref 4–11)

## 2020-07-09 PROCEDURE — 25800030 ZZH RX IP 258 OP 636: Mod: ZF | Performed by: NURSE PRACTITIONER

## 2020-07-09 PROCEDURE — 25800030 ZZH RX IP 258 OP 636: Mod: ZF | Performed by: PEDIATRICS

## 2020-07-09 PROCEDURE — 89050 BODY FLUID CELL COUNT: CPT | Performed by: NURSE PRACTITIONER

## 2020-07-09 PROCEDURE — 36591 DRAW BLOOD OFF VENOUS DEVICE: CPT | Performed by: PEDIATRICS

## 2020-07-09 PROCEDURE — 25000128 H RX IP 250 OP 636: Mod: ZF | Performed by: PEDIATRICS

## 2020-07-09 PROCEDURE — 96360 HYDRATION IV INFUSION INIT: CPT | Performed by: PEDIATRICS

## 2020-07-09 PROCEDURE — 37000008 ZZH ANESTHESIA TECHNICAL FEE, 1ST 30 MIN: Performed by: PEDIATRICS

## 2020-07-09 PROCEDURE — 25000128 H RX IP 250 OP 636: Performed by: NURSE ANESTHETIST, CERTIFIED REGISTERED

## 2020-07-09 PROCEDURE — 25000128 H RX IP 250 OP 636: Performed by: NURSE PRACTITIONER

## 2020-07-09 PROCEDURE — 96450 CHEMOTHERAPY INTO CNS: CPT | Performed by: PEDIATRICS

## 2020-07-09 PROCEDURE — 25800030 ZZH RX IP 258 OP 636: Performed by: NURSE PRACTITIONER

## 2020-07-09 PROCEDURE — 96409 CHEMO IV PUSH SNGL DRUG: CPT

## 2020-07-09 PROCEDURE — 80053 COMPREHEN METABOLIC PANEL: CPT | Performed by: NURSE PRACTITIONER

## 2020-07-09 PROCEDURE — 25000125 ZZHC RX 250: Performed by: NURSE PRACTITIONER

## 2020-07-09 PROCEDURE — 80299 QUANTITATIVE ASSAY DRUG: CPT | Performed by: NURSE PRACTITIONER

## 2020-07-09 PROCEDURE — 96367 TX/PROPH/DG ADDL SEQ IV INF: CPT

## 2020-07-09 PROCEDURE — 25000128 H RX IP 250 OP 636: Mod: ZF | Performed by: NURSE PRACTITIONER

## 2020-07-09 PROCEDURE — 40001011 ZZH STATISTIC PRE-PROCEDURE NURSING ASSESSMENT: Performed by: PEDIATRICS

## 2020-07-09 PROCEDURE — 85025 COMPLETE CBC W/AUTO DIFF WBC: CPT | Performed by: NURSE PRACTITIONER

## 2020-07-09 PROCEDURE — 25800030 ZZH RX IP 258 OP 636: Performed by: NURSE ANESTHETIST, CERTIFIED REGISTERED

## 2020-07-09 PROCEDURE — 40000165 ZZH STATISTIC POST-PROCEDURE RECOVERY CARE: Performed by: PEDIATRICS

## 2020-07-09 PROCEDURE — 25000125 ZZHC RX 250

## 2020-07-09 RX ORDER — SODIUM CHLORIDE, SODIUM LACTATE, POTASSIUM CHLORIDE, CALCIUM CHLORIDE 600; 310; 30; 20 MG/100ML; MG/100ML; MG/100ML; MG/100ML
INJECTION, SOLUTION INTRAVENOUS CONTINUOUS PRN
Status: DISCONTINUED | OUTPATIENT
Start: 2020-07-09 | End: 2020-07-09

## 2020-07-09 RX ORDER — MERCAPTOPURINE 50 MG/1
TABLET ORAL
Qty: 26 TABLET | Refills: 2 | Status: ON HOLD | OUTPATIENT
Start: 2020-07-09 | End: 2020-08-06 | Stop reason: DRUGHIGH

## 2020-07-09 RX ORDER — PROPOFOL 10 MG/ML
INJECTION, EMULSION INTRAVENOUS PRN
Status: DISCONTINUED | OUTPATIENT
Start: 2020-07-09 | End: 2020-07-09

## 2020-07-09 RX ORDER — HEPARIN SODIUM,PORCINE 10 UNIT/ML
5 VIAL (ML) INTRAVENOUS ONCE
Status: CANCELLED | OUTPATIENT
Start: 2020-07-09 | End: 2020-07-09

## 2020-07-09 RX ORDER — CAFFEINE AND SODIUM BENZOATE 125 MG/ML
500 INJECTION, SOLUTION INTRAMUSCULAR; INTRAVENOUS ONCE
Status: DISCONTINUED | OUTPATIENT
Start: 2020-07-09 | End: 2020-07-09

## 2020-07-09 RX ORDER — PROPOFOL 10 MG/ML
INJECTION, EMULSION INTRAVENOUS CONTINUOUS PRN
Status: DISCONTINUED | OUTPATIENT
Start: 2020-07-09 | End: 2020-07-09

## 2020-07-09 RX ORDER — LIDOCAINE 40 MG/G
2.5 CREAM TOPICAL
Status: CANCELLED | OUTPATIENT
Start: 2020-07-09

## 2020-07-09 RX ORDER — PREDNISONE 10 MG/1
TABLET ORAL
Qty: 43 TABLET | Refills: 2 | Status: ON HOLD | OUTPATIENT
Start: 2020-07-09 | End: 2020-10-06

## 2020-07-09 RX ORDER — HEPARIN SODIUM (PORCINE) LOCK FLUSH IV SOLN 100 UNIT/ML 100 UNIT/ML
5 SOLUTION INTRAVENOUS
Status: DISCONTINUED | OUTPATIENT
Start: 2020-07-09 | End: 2020-07-09 | Stop reason: HOSPADM

## 2020-07-09 RX ORDER — ONDANSETRON 2 MG/ML
INJECTION INTRAMUSCULAR; INTRAVENOUS PRN
Status: DISCONTINUED | OUTPATIENT
Start: 2020-07-09 | End: 2020-07-09

## 2020-07-09 RX ORDER — LIDOCAINE 40 MG/G
CREAM TOPICAL
Status: COMPLETED
Start: 2020-07-09 | End: 2020-07-09

## 2020-07-09 RX ADMIN — METHOTREXATE: 25 INJECTION INTRA-ARTERIAL; INTRAMUSCULAR; INTRATHECAL; INTRAVENOUS at 13:03

## 2020-07-09 RX ADMIN — PROPOFOL 60 MG: 10 INJECTION, EMULSION INTRAVENOUS at 12:59

## 2020-07-09 RX ADMIN — HEPARIN 5 ML: 100 SYRINGE at 14:29

## 2020-07-09 RX ADMIN — SODIUM CHLORIDE, POTASSIUM CHLORIDE, SODIUM LACTATE AND CALCIUM CHLORIDE: 600; 310; 30; 20 INJECTION, SOLUTION INTRAVENOUS at 12:57

## 2020-07-09 RX ADMIN — SODIUM CHLORIDE 50 ML: 9 INJECTION, SOLUTION INTRAVENOUS at 14:19

## 2020-07-09 RX ADMIN — VINCRISTINE SULFATE 2 MG: 1 INJECTION, SOLUTION INTRAVENOUS at 14:24

## 2020-07-09 RX ADMIN — PROPOFOL 20 MG: 10 INJECTION, EMULSION INTRAVENOUS at 13:02

## 2020-07-09 RX ADMIN — ONDANSETRON 4 MG: 2 INJECTION INTRAMUSCULAR; INTRAVENOUS at 13:14

## 2020-07-09 RX ADMIN — PROPOFOL 175 MCG/KG/MIN: 10 INJECTION, EMULSION INTRAVENOUS at 13:00

## 2020-07-09 RX ADMIN — CAFFEINE AND SODIUM BENZOATE 500 MG: 125 INJECTION, SOLUTION INTRAMUSCULAR; INTRAVENOUS at 13:25

## 2020-07-09 RX ADMIN — PROPOFOL 10 MG: 10 INJECTION, EMULSION INTRAVENOUS at 13:05

## 2020-07-09 ASSESSMENT — MIFFLIN-ST. JEOR: SCORE: 1889.13

## 2020-07-09 NOTE — LETTER
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 Lazaro presented with acute onset cough and was found to have an anterior mediastinal mass and malignant left-sided pleural effusion.  A CT guided biopsy was obtained at 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  He was admitted to [facility] oncology service 8/30 and started on treatment per [id number].  He had a pleural effusion that required a chest tube and a pericardial effusion that was not drained. His Induction was complicated by cardiac compression secondary to his mass leading to tamponade, this improved through out his hospital stay.  He also had dysphagia that improved with treatment of his mass, swallow study was normal. His course was recently complicated by extensive bilateral UE DVTs, he remains on anticoagulation.  Most recently his course was complicated by the development of severe asparaginase induced pancreatitis. He became quite ill with SIRS and associated hypotension, he required pressor support in the ICU.  Fortunately Lazaro recovered well and his subsequent imaging has continued to show improvement.  He comes to clinic today for Day 1 of 2nd Maintenance cycle.     HPI: Lazaro was very happy to report that his nausea has been much better over the past 2-3 weeks. He does still have some intermittent stomach discomfort but the nausea has been minimal. He was able to start working again. He didn't need to use ativan prior to clinic today and he reports feeling comfortable. Eating better. No other complaints of pain. Sleeping well. Playing video games and working. No fevers or viral symptoms.  No bruising, bleeding, headaches, or vision changes. Mood has been stable.      Review of systems:  Pertinent positives reported in the HPI. All other systems in a  complete and comprehensive review of systems were negative..     PMH:   Past Medical History        Past Medical History:   Diagnosis Date     Acute necrotizing pancreatitis 11/07/2019     attributed to asparaginase     Acute pancreatitis due to PEGaspariginase therapy  11/15/2019     DVT of upper extremity (deep vein thrombosis) (H) 09/26/2019     Bilateral      Edema of upper extremity 10/17/2019     Folliculitis 10/17/2019     Migraine 2006     have resolved     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      -- Spinal HA following LP  -- TPMT shows Intermediate activity with normal TPMT/NUDT15 genotype  -- Factor V leiden and prothrombin gene mutation negative  -- Necrotizing pancreatitis secondary to asparaginase  -former smoke, quit with the use of nicotine patches.     PFMH:   Family History         Family History   Problem Relation Age of Onset     No Known Problems Mother       No Known Problems Father       Asthma Brother       Thyroid Cancer Paternal Grandmother       Melanoma Paternal Aunt          Social History: Lazaro previously lived at home with his dad and step mom in [place] but is now staying with his grandma in [place].  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  He has been enjoying fishing and [employer] games.     Current Medications:  Prior to Admission medications    Medication Sig Start Date End Date Taking? Authorizing Provider   allopurinol (ZYLOPRIM) 100 MG tablet Take 1 tablet (100 mg) by mouth daily 6/9/20   Félix Van APRN CNP   diphenhydrAMINE (BENADRYL) 25 MG capsule Take 1-2 capsules (25-50 mg) by mouth every 6 hours as needed (Breakthrough Nausea and Vomiting ) 10/3/19   Félix Van APRN CNP   lidocaine-prilocaine (EMLA) 2.5-2.5 % external cream Apply topically as needed for other (prior to port access) 4/9/20   Félix Van APRN CNP   LORazepam (ATIVAN) 1 MG tablet Take 1 tablet prior to clinic visits for anticipatory vomiting. 6/9/20   Rehan  YOHAN Cramer CNP   mercaptopurine (PURINETHOL) 50 MG tablet Take 1 tablet (50mg) four days per week and 1/2 tablet (25mg) three days/week. 7/9/20   Félix Van APRN CNP   mercaptopurine (PURINETHOL) 50 MG tablet Take 1 tablet (50mg) four days per week and 1/2 tablet (25mg) three days/week. 6/9/20   Félix Van APRN CNP   methotrexate 2.5 MG tablet Take 17 tablets (42.5 mg) by mouth once a week Do not take on Days of LP. 7/9/20   Félix Van APRN CNP   methotrexate 2.5 MG tablet Take 17 tablets (42.5 mg) by mouth once a week Do not take on Days of LP. 6/3/20   Félix Van APRN CNP   ondansetron (ZOFRAN-ODT) 8 MG ODT tab Take 1 tablet (8 mg) by mouth every 8 hours as needed for nausea 2/21/20   Deneen Diggs MD   pantoprazole (PROTONIX) 40 MG EC tablet Take 1 tablet (40 mg) by mouth every morning (before breakfast) 4/23/20   Félix Van APRN CNP   polyethylene glycol (MIRALAX/GLYCOLAX) packet Take 17 g by mouth daily 11/8/19   Reynaldo Campuzano MD   predniSONE (DELTASONE) 10 MG tablet Take 45mg (4.5 tabs) in the AM and 40mg (4 tabs) in the PM for 5 days every 4 weeks. 7/9/20   Félix Van APRN CNP   predniSONE (DELTASONE) 10 MG tablet Take 45mg (4.5 tabs) in the AM and 40mg (4 tabs) in the PM for 5 days 4/23/20   Félix Van APRN CNP   scopolamine (TRANSDERM) 1 MG/3DAYS 72 hr patch Place 1 patch onto the skin every 72 hours 5/14/20   Félix Van APRN CNP   scopolamine (TRANSDERM) 1 MG/3DAYS 72 hr patch Place 1 patch onto the skin every 72 hours 12/19/19   Félix Van APRN CNP   sennosides (SENOKOT) 8.6 MG tablet Take 2 tablets by mouth 2 times daily as needed for constipation 11/8/19   Reynaldo Campuzano MD   sulfamethoxazole-trimethoprim (BACTRIM DS) 800-160 MG tablet Take 1 tablet by mouth Every Mon, Tues two times daily 4/27/20   Félix Van APRN CNP   Vitamin D, Cholecalciferol, 25 MCG (1000 UT) TABS Take 2  tablets (2,000 Units) by mouth daily 12/12/19   Nicho Fischer MD      Reviewed medications with Lazaro, he has not missed any doses of chemotherapy.     Received influenza vaccine for the 8444-0285 season.       Physical Exam:   Temp: 36.7; Pulse: 75; Resp: 18; BP: 130/62; SpO2: 99%; Weight: 84.1 kg (down from 85.1 kg one month ago); Height: 184.5 cm     General: Lazaro is alert, interactive and appropriate.  HEENT: Skull is atrauamatic and normocephalic.  Full head of hair. PERRLA, sclera are non icteric and not injected, EOM are intact. Nares are patent without drainage. Oropharynx clear, no plaques noted. Buccal mucosa and tongue clear. MMM.  Neck:  Supple without lymphadenopathy.   Lymph: There is no cervical, supraclavicular, axillary, lymphadenopathy palpated.  Cardiovascular:  HR is regular, S1, S2 no murmur.  Capillary refill is < 2 seconds.   Respiratory: No cough noted. Respirations are easy.  Lungs are clear to auscultation throughout.  No crackles or wheezes.  Gastrointestinal: BS present in all quadrants.  Abdomen is soft and flat.  No pain with palpation. No hepatosplenomegaly or masses are palpated.  Skin: Few scattered acne lesions on his lower back.  No surrounding erythema, no drainage. No other skin lesions noted.  Neurological:  CN 2-12 grossly intact. Gait is normal.  No issues with balance. Sensation intact in hands and feet.     Musculoskeletal:  Good strength and ROM in all extremities.  Strong dorsiflexion at ankles and great toes (5/5) bilaterally without any pain at the Achilles.     Labs:   Results for orders placed or performed during the hospital encounter of 07/09/20 (from the past 24 hour(s))   CBC with platelets differential   Result Value Ref Range    WBC 3.6 (L) 4.0 - 11.0 10e9/L    RBC Count 3.66 (L) 4.4 - 5.9 10e12/L    Hemoglobin 12.9 (L) 13.3 - 17.7 g/dL    Hematocrit 37.6 (L) 40.0 - 53.0 %     (H) 78 - 100 fl    MCH 35.2 (H) 26.5 - 33.0 pg    MCHC 34.3 31.5 -  36.5 g/dL    RDW 14.6 10.0 - 15.0 %    Platelet Count 147 (L) 150 - 450 10e9/L    Diff Method Automated Method     % Neutrophils 82.7 %    % Lymphocytes 9.8 %    % Monocytes 6.4 %    % Eosinophils 0.8 %    % Basophils 0.3 %    % Immature Granulocytes 0.0 %    Nucleated RBCs 0 0 /100    Absolute Neutrophil 3.0 1.6 - 8.3 10e9/L    Absolute Lymphocytes 0.4 (L) 0.8 - 5.3 10e9/L    Absolute Monocytes 0.2 0.0 - 1.3 10e9/L    Absolute Eosinophils 0.0 0.0 - 0.7 10e9/L    Absolute Basophils 0.0 0.0 - 0.2 10e9/L    Abs Immature Granulocytes 0.0 0 - 0.4 10e9/L    Absolute Nucleated RBC 0.0    Comprehensive metabolic panel   Result Value Ref Range    Sodium 142 133 - 144 mmol/L    Potassium 3.7 3.4 - 5.3 mmol/L    Chloride 110 (H) 94 - 109 mmol/L    Carbon Dioxide 29 20 - 32 mmol/L    Anion Gap 4 3 - 14 mmol/L    Glucose 103 (H) 70 - 99 mg/dL    Urea Nitrogen 10 7 - 30 mg/dL    Creatinine 0.70 0.66 - 1.25 mg/dL    GFR Estimate >90 >60 mL/min/[1.73_m2]    GFR Estimate If Black >90 >60 mL/min/[1.73_m2]    Calcium 8.6 8.5 - 10.1 mg/dL    Bilirubin Total 0.4 0.2 - 1.3 mg/dL    Albumin 4.0 3.4 - 5.0 g/dL    Protein Total 6.4 (L) 6.8 - 8.8 g/dL    Alkaline Phosphatase 64 40 - 150 U/L    ALT 25 0 - 70 U/L    AST 19 0 - 45 U/L   Cell count with differential CSF:   Result Value Ref Range    WBC CSF 0 0 - 5 /uL    RBC CSF 1 0 - 2 /uL    Tube Number 1 #    Color CSF Colorless CLRL^Colorless    Appearance CSF Clear CLER^Clear     Procedures:   DESCRIPTION OF PROCEDURE: Informed consent for the procedure was obtained prior to starting. A time out was performed to identify the patient and the procedure. The patient was sedated by anesthesia. The patient then was placed in the lateral decubitus position. The skin overlying the lower spinal column was prepped and draped in a sterile fashion.  A 3.5 inch 22 gauge pencil tip ([employer]) spinal needle was inserted into the L3-L4 spinal interspace and clear CSF was obtained. Six ml was removed and  then intrathecal chemotherapy was then administered consisting of methotrexate.  The spinal needle was removed.  The patient tolerated the procedure well. CSF was sent for cell count and cytopathologic evaluation.    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  He is being treated per [facility] protocol [id number]. He's had a CRu following Induction with a CR at the end of Consolidation. His course has been complicated by extensive bilateral DVT and severe necrotizing pancreatitis. He completed treatment with lovenox. Recent abdominal CT shows continued improvement in regards to the sequelae from his pancreatitis.  Asparaginase was permanently discontinued from his treatment regimen. He is on Vit D for deficiency.      He comes to clinic today for Day 1 Cycle 2 of Maintenance therapy. Although his weight is down today, he reports his nausea is much improved and is eating better so hopefully his weight will begin to trend back up. Last month, allopurinol was added and his 6-MP dose reduced to try and decrease 6-MP hepatic toxicity and increase therapeutic efficacy. He has had no problems with this change. He has been on this dosing for about 3 weeks. Today his ANC is above the goal range but his platelets have trended down. Given the relatively recent addition of allopurinol and down-trending platelets I won't change his 6-MP dosing today but he may need a dose increase at the next visit if his ANC remains above target.     Plan:   1) Labs reviewed with Lazaro. TGNS pending.   2) LP/IT methotrexate. Vincristine in clinic. Refills provided of methotrexate, 6-MP, and steroids. Reviewed dosing.   3) Continue protonix.  4) Continue to monitor for new symptoms of thrombosis while off of lovenox. Low threshold to repeat U/S if any signs of thrombus.   5) Given significant spinal headache history I used a pencil tip Slava needle today and he received  caffeine.   6) Continue Vit D 3 2000 IU daily (last level was 19 on 3/5/20).  Recheck level in August.  7) Nausea much improved but will continue to monitor and he will call if worsens again.  8) Bactrim for PCP prophylaxis.  9) Continue to check CMP and TGNs monthly moving forward.  10) Didn't need ativan prior to clinic today for anticipatory vomiting, but it remains an option.  11) RTC in 4 weeks for D29 of Cycle 2.    Todd Mercado MD/PhD  Pediatric Oncology

## 2020-07-09 NOTE — DISCHARGE INSTRUCTIONS
Home Instructions for Your Child after Sedation  Today your child received (medicine):  Propofol and Zofran  Please keep this form with your health records  Your child may be more sleepy and irritable today than normal. Also, an adult should stay with your child for the rest of the day. The medicine may make the child dizzy. Avoid activities that require balance (bike riding, skating, climbing stairs, walking).  Remember:    When your child wants to eat again, start with liquids (juice, soda pop, Popsicles). If your child feels well enough, you may try a regular diet. It is best to offer light meals for the first 24 hours.    If your child has nausea (feels sick to the stomach) or vomiting (throws up), give small amounts of clear liquids (7-Up, Sprite, apple juice or broth). Fluids are more important than food until your child is feeling better.    Wait 24 hours before giving medicine that contains alcohol. This includes liquid cold, cough and allergy medicines (Robitussin, Vicks Formula 44 for children, Benadryl, Chlor-Trimeton).    If you will leave your child with a , give the sitter a copy of these instructions.  Call your doctor if:    You have questions about the test results.    Your child vomits (throws up) more than two times.    Your child is very fussy or irritable.    You have trouble waking your child.     If your child has trouble breathing, call 911.  If you have any questions or concerns, please call:  Pediatric Sedation Unit [phone]  Pediatric clinic  [phone]  [facility]  [phone] (ask for the anesthesiologist on call)  Emergency department [phone]  [facility] toll-free number [phone] (Monday--Friday, 8 a.m. to 4:30 p.m.)  I understand these instructions. I have all of my personal belongings.      [facility]   [phone]    Care post Lumbar Puncture     Do not remove bandage/dressing for 24 hours -- after this time they can be removed    No  bath, shower or soaking of the dressing for 24 hours    Activity as tolerated by the patient    Diet as able to tolerated    May use Tylenol as needed for pain control -- DO NOT use Ibuprofen    Can apply icepack to the site for discomfort -- no more than 10 minutes at a time    If bleeding presents apply pressure for 5 minutes    Call [phone] ask for Peds BMT/Hem/Onc fellow on call if complications arise including:    persistent bleeding    fever greater than 100.5    Pain    Lumbar punctures can cause headache. If the pain is not controlled with Tylenol (acetaminophen) please call the Peds BMT/Hem/Onc fellow on call

## 2020-07-09 NOTE — ANESTHESIA PREPROCEDURE EVALUATION
Anesthesia Pre-Procedure Evaluation    Patient: Lazaro Lund   MRN:     [id number] Gender:   male   Age:    20 year old :      2000        Preoperative Diagnosis: Lymphoma (H) [C85.90]   Procedure(s):  Lumbar puncture with intrathecal Chemotherapy (not CD)     LABS:  CBC:   Lab Results   Component Value Date    WBC 1.8 (L) 2020    WBC 2.9 (L) 2020    HGB 10.7 (L) 2020    HGB 10.8 (L) 2020    HCT 31.5 (L) 2020    HCT 33.6 (L) 2020     2020     2020     BMP:   Lab Results   Component Value Date     2020     2020    POTASSIUM 4.1 2020    POTASSIUM 3.8 2020    CHLORIDE 108 2020    CHLORIDE 107 2020    CO2 27 2020    CO2 29 2020    BUN 17 2020    BUN 13 2020    CR 0.68 2020    CR 0.67 2020    GLC 96 2020     (H) 2020     COAGS:   Lab Results   Component Value Date    PTT 45 (H) 2019    INR 1.09 2019    FIBR 293 2019     POC:   Lab Results   Component Value Date    BGM 87 2019     OTHER:   Lab Results   Component Value Date    LACT 0.4 (L) 2019    MICHAEL 8.9 2020    PHOS 4.2 2019    MAG 2.1 2019    ALBUMIN 4.3 2020    PROTTOTAL 6.8 2020    ALT 50 2020    AST 16 2020    ALKPHOS 58 2020    BILITOTAL 0.6 2020    LIPASE 21 (L) 2020    AMYLASE 24 (L) 2020    .0 (H) 2019        Preop Vitals    BP Readings from Last 3 Encounters:   20 126/83   20 116/68   20 114/59    Pulse Readings from Last 3 Encounters:   20 71   20 84   20 82      Resp Readings from Last 3 Encounters:   20 16   20 18   20 18    SpO2 Readings from Last 3 Encounters:   20 99%   20 99%   20 98%      Temp Readings from Last 1 Encounters:   20 36.6  C (97.9  F) (Oral)    Ht Readings from Last 1 Encounters:  "  06/09/20 1.847 m (6' 0.72\")      Wt Readings from Last 1 Encounters:   06/09/20 85.1 kg (187 lb 9.8 oz)    Estimated body mass index is 24.95 kg/m  as calculated from the following:    Height as of 6/9/20: 1.847 m (6' 0.72\").    Weight as of 6/9/20: 85.1 kg (187 lb 9.8 oz).     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      Past Surgical History:   Procedure Laterality Date     BONE MARROW BIOPSY, BONE SPECIMEN, NEEDLE/TROCAR Left 9/1/2019    Procedure: BIOPSY, BONE MARROW;  Surgeon: Heather Lopez MD;  [facility]     INSERT PICC LINE N/A 8/31/2019    Procedure: INSERTION, PICC;  Surgeon: Michell Keith MD;  [facility]     INSERT PORT VASCULAR ACCESS N/A 10/24/2019    Procedure: INSERTION, VASCULAR ACCESS PORT;  Surgeon: Silviano Martins MD;  [facility]      IR CHEST PORT PLACEMENT > 5 YRS OF AGE  10/24/2019     IR CHEST TUBE PLACEMENT NON-TUNNELLED LEFT  8/31/2019     IR PICC PLACEMENT > 5 YRS OF AGE  8/31/2019     IR PORT CHECK RIGHT  11/12/2019     SPINAL PUNCTURE,LUMBAR, INTRATHECAL CHEMO DELIVERY N/A 8/31/2019    Procedure: LUMBAR PUNCTURE, WITH INTRATHECAL CHEMOTHERAPY ADMINISTRATION;  Surgeon: Heather Lopez MD;  [facility]     SPINAL PUNCTURE,LUMBAR, INTRATHECAL CHEMO DELIVERY N/A 9/9/2019    Procedure: Lumbar Puncture With Intrathecal Chemo;  Surgeon: Alexi Hayes MD;  [facility]     SPINAL PUNCTURE,LUMBAR, INTRATHECAL CHEMO DELIVERY N/A 10/10/2019    Procedure: Lumbar puncture with IT Chemo " (CD);  Surgeon: Reynaldo Campuzano MD;  [facility]      SPINAL PUNCTURE,LUMBAR, INTRATHECAL CHEMO DELIVERY N/A 10/17/2019    Procedure: Lumbar puncture with IT Chemo (not CD);  Surgeon: Reynaldo Campuzano MD;  [facility]      SPINAL PUNCTURE,LUMBAR, INTRATHECAL CHEMO DELIVERY N/A 10/24/2019    Procedure: LUMBAR PUNCTURE, WITH INTRATHECAL CHEMOTHERAPY ADMINISTRATION;  Surgeon: Félix Van APRN CNP;  [facility]      SPINAL PUNCTURE,LUMBAR, INTRATHECAL CHEMO DELIVERY N/A 10/31/2019    Procedure: Lumbar puncture with IT Chemo (not CD);  Surgeon: Reynaldo Campuzano MD;  [facility]      SPINAL PUNCTURE,LUMBAR, INTRATHECAL CHEMO DELIVERY N/A 12/19/2019    Procedure: Lumbar puncture with IT Chem (CD);  Surgeon: Félix Van APRN CNP;  [facility]      SPINAL PUNCTURE,LUMBAR, INTRATHECAL CHEMO DELIVERY N/A 12/23/2019    Procedure: Lumbar puncture with IT Chem (CD);  Surgeon: Heather Lopez MD;  [facility]      SPINAL PUNCTURE,LUMBAR, INTRATHECAL CHEMO DELIVERY N/A 1/23/2020    Procedure: Lumbar puncture with IT Chem (CD);  Surgeon: Reynaldo Campuzano MD;  [facility]      SPINAL PUNCTURE,LUMBAR, INTRATHECAL CHEMO DELIVERY N/A 2/20/2020    Procedure: Lumbar puncture with intrathecal Chemotherapy (CD);  Surgeon: Reynaldo Campuzano MD;  [facility]      SPINAL PUNCTURE,LUMBAR, INTRATHECAL CHEMO DELIVERY N/A 3/19/2020    Procedure: Lumbar puncture with intrathecal Chemotherapy (CD);  Surgeon: Reynaldo Campuzano MD;  [facility]      SPINAL PUNCTURE,LUMBAR, INTRATHECAL CHEMO DELIVERY N/A 3/26/2020    Procedure: Lumbar puncture with intrathecal Chemotherapy (not CD);  Surgeon: Todd Mercado MD;  [facility]      SPINAL PUNCTURE,LUMBAR, INTRATHECAL CHEMO DELIVERY N/A 4/23/2020    Procedure: Lumbar puncture with  intrathecal Chemotherapy (CD);  Surgeon: Marleny Brewer MD;  [facility]      SPINAL PUNCTURE,LUMBAR, INTRATHECAL CHEMO DELIVERY N/A 5/14/2020    Procedure: Lumbar puncture with intrathecal Chemotherapy (not CD);  Surgeon: Todd Mercado MD;  [facility]      THORACENTESIS N/A 8/31/2019    Procedure: Thoracentesis;  Surgeon: Michell Keith MD;  [facility]      Allergies   Allergen Reactions     Asparaginase Derivatives Other (See Comments)     Severe pancreatitis     No Known Drug Allergies         Anesthesia Evaluation    ROS/Med Hx    No history of anesthetic complications    Cardiovascular Findings   Comments: TTE 02/04/2020: Normal echocardiogram. Normal appearance and motion of the tricuspid, mitral, pulmonary and aortic valves. No atrial, ventricular or arterial level shunting. LV and RV have normal chamber size, wall thickness, and systolic function. LVEF 67 %.    Neuro Findings   Comments: Migraine   Neuropathic pain   Insomnia due to medical condition         Pulmonary Findings - negative ROS    HENT Findings - negative HENT ROS    Skin Findings - negative skin ROS      GI/Hepatic/Renal Findings - negative ROS    Endocrine/Metabolic Findings       Comments: Vitamin D deficiency     Genetic/Syndrome Findings - negative genetics/syndromes ROS    Hematology/Oncology Findings   (+) cancer (  Lymphoma (H) ) and blood dyscrasia    Additional Notes  Edema of upper extremity  Smokes marijuana  Nicotine patch          PHYSICAL EXAM:   Mental Status/Neuro: A/A/O   Airway: Facies: Feasible  Mallampati: I  Mouth/Opening: Full  TM distance: > 6 cm  Neck ROM: Full   Respiratory: Auscultation: CTAB     Resp. Rate: Normal     Resp. Effort: Normal      CV: Rhythm: Regular  Rate: Age appropriate  Heart: Normal Sounds  Edema: None   Comments: Nauseated, repeatedly gags and brings up phlegm     Dental: Normal Dentition                Assessment:   ASA SCORE: 3    H&P: History and  physical reviewed and following examination; no interval change.     Smoking Status:  Active Smoker       - patient did not smoke on day of surgery       - instructed to abstain from smoking on day of procedure   NPO Status: NPO Appropriate     Plan:   Anes. Type:  MAC   Pre-Medication: Midazolam   Induction:  IV (Standard)   Airway: Native Airway   Access/Monitoring: PIV   Maintenance: N/a     Postop Plan:   Postop Pain: None  Postop Sedation/Airway: Not planned  Disposition: Outpatient     PONV Management:    Prevention: Ondansetron     CONSENT: Direct conversation   Plan and risks discussed with: Patient   Blood Products: Consent Deferred (Minimal Blood Loss)       Comments for Plan/Consent:  GA with native airway, ETT as back up  Standard ASA monitors  All pertinent results and records reviewed, risks, included but not limited to hypoventilation, hypoxemia, laryngo/bronchospasm, N/V d/w parents, patient, all questions, concerns addressed           Isaiah Horowitz MD

## 2020-07-09 NOTE — PROGRESS NOTES
Infusion Nursing Note    Lazaro Lund Presents to [facility] today for: Vincristine after LP    Due to : T lymphoblastic lymphoma (H)    Intravenous Access/Labs: Port accessed prior to arrival to clinic in Peds sedation.     Infusion Note: Pt. Seen and assessed by Dr. Mercado in Peds Sedation.  Pt. Denies any nausea.  Vincristine given to gravity. Blood return noted pre/mid/post infusion. Port heparin locked and deaccessed.  VSS.    Discharge Plan:   Patient verbalized understanding of discharge instructions.  Pt left [facility] in stable condition at conclusion of appt.

## 2020-07-09 NOTE — ANESTHESIA CARE TRANSFER NOTE
Patient: Lazaro Lund    Procedure(s):  Lumbar puncture with intrathecal Chemotherapy (CD)    Diagnosis: Lymphoma (H) [C85.90]  Diagnosis Additional Information: No value filed.    Anesthesia Type:   MAC     Note:  Airway :Nasal Cannula  Patient transferred to: Recovery  Handoff Report: Identifed the Patient, Identified the Reponsible Provider, Reviewed the pertinent medical history, Discussed the surgical course, Reviewed Intra-OP anesthesia mangement and issues during anesthesia, Set expectations for post-procedure period and Allowed opportunity for questions and acknowledgement of understanding      Vitals: (Last set prior to Anesthesia Care Transfer)    CRNA VITALS  7/9/2020 1245 - 7/9/2020 1326      7/9/2020             NIBP:  (!) 88/43    Pulse:  76    Temp:  36.5  C (97.7  F)    SpO2:  100 %    Resp Rate (observed):  16                Electronically Signed By: YOHAN Alfaro CRNA  July 9, 2020  1:26 PM

## 2020-07-09 NOTE — PROGRESS NOTES
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 Lazaro presented with acute onset cough and was found to have an anterior mediastinal mass and malignant left-sided pleural effusion.  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  He was admitted to [facility] oncology service 8/30 and started on treatment per [id number].  He had a pleural effusion that required a chest tube and a pericardial effusion that was not drained. His Induction was complicated by cardiac compression secondary to his mass leading to tamponade, this improved through out his hospital stay.  He also had dysphagia that improved with treatment of his mass, swallow study was normal. His course was recently complicated by extensive bilateral UE DVTs, he remains on anticoagulation.  Most recently his course was complicated by the development of severe asparaginase induced pancreatitis. He became quite ill with SIRS and associated hypotension, he required pressor support in the ICU.  Fortunately Lazaro recovered well and his subsequent imaging has continued to show improvement.  He comes to clinic today for Day 1 of 2nd Maintenance cycle.     HPI: Lazaro was very happy to report that his nausea has been much better over the past 2-3 weeks. He does still have some intermittent stomach discomfort but the nausea has been minimal. He was able to start working again. He didn't need to use ativan prior to clinic today and he reports feeling comfortable. Eating better. No other complaints of pain. Sleeping well. Playing video games and working. No fevers or viral symptoms.  No bruising, bleeding, headaches, or vision changes. Mood has been stable.      Review of systems:  Pertinent positives reported in the HPI. All other systems in a complete and comprehensive review of systems were negative..     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     PFMH:   Family History         Family History   Problem Relation Age of Onset     No Known Problems Mother       No Known Problems Father       Asthma Brother       Thyroid Cancer Paternal Grandmother       Melanoma Paternal Aunt          Social History: Lazaro previously lived at home with his dad and step mom in [place] but is now staying with his grandma in [place].  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  He has been enjoying fishing and video games.     Current Medications:  Prior to Admission medications    Medication Sig Start Date End Date Taking? Authorizing Provider   allopurinol (ZYLOPRIM) 100 MG tablet Take 1 tablet (100 mg) by mouth daily 6/9/20   Félix Van APRN CNP   diphenhydrAMINE (BENADRYL) 25 MG capsule Take 1-2 capsules (25-50 mg) by mouth every 6 hours as needed (Breakthrough Nausea and Vomiting ) 10/3/19   Félix Van APRN CNP   lidocaine-prilocaine (EMLA) 2.5-2.5 % external cream Apply topically as needed for other (prior to port access) 4/9/20   Félix aVn APRN CNP   LORazepam (ATIVAN) 1 MG tablet Take 1 tablet prior to clinic visits for anticipatory vomiting. 6/9/20   Félix Van APRN CNP   mercaptopurine (PURINETHOL) 50 MG tablet Take 1 tablet (50mg) four  days per week and 1/2 tablet (25mg) three days/week. 7/9/20   Félix Van APRN CNP   mercaptopurine (PURINETHOL) 50 MG tablet Take 1 tablet (50mg) four days per week and 1/2 tablet (25mg) three days/week. 6/9/20   Félix Van APRN CNP   methotrexate 2.5 MG tablet Take 17 tablets (42.5 mg) by mouth once a week Do not take on Days of LP. 7/9/20   Félix Van APRN CNP   methotrexate 2.5 MG tablet Take 17 tablets (42.5 mg) by mouth once a week Do not take on Days of LP. 6/3/20   Félix Van APRN CNP   ondansetron (ZOFRAN-ODT) 8 MG ODT tab Take 1 tablet (8 mg) by mouth every 8 hours as needed for nausea 2/21/20   Deneen Diggs MD   pantoprazole (PROTONIX) 40 MG EC tablet Take 1 tablet (40 mg) by mouth every morning (before breakfast) 4/23/20   Félix Van APRN CNP   polyethylene glycol (MIRALAX/GLYCOLAX) packet Take 17 g by mouth daily 11/8/19   Reynaldo Campuzano MD   predniSONE (DELTASONE) 10 MG tablet Take 45mg (4.5 tabs) in the AM and 40mg (4 tabs) in the PM for 5 days every 4 weeks. 7/9/20   Félix Van APRN CNP   predniSONE (DELTASONE) 10 MG tablet Take 45mg (4.5 tabs) in the AM and 40mg (4 tabs) in the PM for 5 days 4/23/20   Félix Van APRN CNP   scopolamine (TRANSDERM) 1 MG/3DAYS 72 hr patch Place 1 patch onto the skin every 72 hours 5/14/20   Félix Van APRN CNP   scopolamine (TRANSDERM) 1 MG/3DAYS 72 hr patch Place 1 patch onto the skin every 72 hours 12/19/19   Félix Van APRN CNP   sennosides (SENOKOT) 8.6 MG tablet Take 2 tablets by mouth 2 times daily as needed for constipation 11/8/19   Reynaldo Campuzano MD   sulfamethoxazole-trimethoprim (BACTRIM DS) 800-160 MG tablet Take 1 tablet by mouth Every Mon, Tues two times daily 4/27/20   Félix Van APRN CNP   Vitamin D, Cholecalciferol, 25 MCG (1000 UT) TABS Take 2 tablets (2,000 Units) by mouth daily 12/12/19   Nicho Fischer MD      Reviewed  medications with Lazaro, he has not missed any doses of chemotherapy.     Received influenza vaccine for the 0238-6704 season.       Physical Exam:   Temp: 36.7; Pulse: 75; Resp: 18; BP: 130/62; SpO2: 99%; Weight: 84.1 kg (down from 85.1 kg one month ago); Height: 184.5 cm     General: Lazaro is alert, interactive and appropriate.  HEENT: Skull is atrauamatic and normocephalic.  Full head of hair. PERRLA, sclera are non icteric and not injected, EOM are intact. Nares are patent without drainage. Oropharynx clear, no plaques noted. Buccal mucosa and tongue clear. MMM.  Neck:  Supple without lymphadenopathy.   Lymph: There is no cervical, supraclavicular, axillary, lymphadenopathy palpated.  Cardiovascular:  HR is regular, S1, S2 no murmur.  Capillary refill is < 2 seconds.   Respiratory: No cough noted. Respirations are easy.  Lungs are clear to auscultation throughout.  No crackles or wheezes.  Gastrointestinal: BS present in all quadrants.  Abdomen is soft and flat.  No pain with palpation. No hepatosplenomegaly or masses are palpated.  Skin: Few scattered acne lesions on his lower back.  No surrounding erythema, no drainage. No other skin lesions noted.  Neurological:  CN 2-12 grossly intact. Gait is normal.  No issues with balance. Sensation intact in hands and feet.     Musculoskeletal:  Good strength and ROM in all extremities.  Strong dorsiflexion at ankles and great toes (5/5) bilaterally without any pain at the Achilles.     Labs:   Results for orders placed or performed during the hospital encounter of 07/09/20 (from the past 24 hour(s))   CBC with platelets differential   Result Value Ref Range    WBC 3.6 (L) 4.0 - 11.0 10e9/L    RBC Count 3.66 (L) 4.4 - 5.9 10e12/L    Hemoglobin 12.9 (L) 13.3 - 17.7 g/dL    Hematocrit 37.6 (L) 40.0 - 53.0 %     (H) 78 - 100 fl    MCH 35.2 (H) 26.5 - 33.0 pg    MCHC 34.3 31.5 - 36.5 g/dL    RDW 14.6 10.0 - 15.0 %    Platelet Count 147 (L) 150 - 450 10e9/L    Diff  Method Automated Method     % Neutrophils 82.7 %    % Lymphocytes 9.8 %    % Monocytes 6.4 %    % Eosinophils 0.8 %    % Basophils 0.3 %    % Immature Granulocytes 0.0 %    Nucleated RBCs 0 0 /100    Absolute Neutrophil 3.0 1.6 - 8.3 10e9/L    Absolute Lymphocytes 0.4 (L) 0.8 - 5.3 10e9/L    Absolute Monocytes 0.2 0.0 - 1.3 10e9/L    Absolute Eosinophils 0.0 0.0 - 0.7 10e9/L    Absolute Basophils 0.0 0.0 - 0.2 10e9/L    Abs Immature Granulocytes 0.0 0 - 0.4 10e9/L    Absolute Nucleated RBC 0.0    Comprehensive metabolic panel   Result Value Ref Range    Sodium 142 133 - 144 mmol/L    Potassium 3.7 3.4 - 5.3 mmol/L    Chloride 110 (H) 94 - 109 mmol/L    Carbon Dioxide 29 20 - 32 mmol/L    Anion Gap 4 3 - 14 mmol/L    Glucose 103 (H) 70 - 99 mg/dL    Urea Nitrogen 10 7 - 30 mg/dL    Creatinine 0.70 0.66 - 1.25 mg/dL    GFR Estimate >90 >60 mL/min/[1.73_m2]    GFR Estimate If Black >90 >60 mL/min/[1.73_m2]    Calcium 8.6 8.5 - 10.1 mg/dL    Bilirubin Total 0.4 0.2 - 1.3 mg/dL    Albumin 4.0 3.4 - 5.0 g/dL    Protein Total 6.4 (L) 6.8 - 8.8 g/dL    Alkaline Phosphatase 64 40 - 150 U/L    ALT 25 0 - 70 U/L    AST 19 0 - 45 U/L   Cell count with differential CSF:   Result Value Ref Range    WBC CSF 0 0 - 5 /uL    RBC CSF 1 0 - 2 /uL    Tube Number 1 #    Color CSF Colorless CLRL^Colorless    Appearance CSF Clear CLER^Clear     Procedures:   DESCRIPTION OF PROCEDURE: Informed consent for the procedure was obtained prior to starting. A time out was performed to identify the patient and the procedure. The patient was sedated by anesthesia. The patient then was placed in the lateral decubitus position. The skin overlying the lower spinal column was prepped and draped in a sterile fashion.  A 3.5 inch 22 gauge pencil tip ([employer]ur[employer]) spinal needle was inserted into the L3-L4 spinal interspace and clear CSF was obtained. Six ml was removed and then intrathecal chemotherapy was then administered consisting of methotrexate.  The  spinal needle was removed.  The patient tolerated the procedure well. CSF was sent for cell count and cytopathologic evaluation.    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  He is being treated per [facility] protocol [id number]. He's had a CRu following Induction with a CR at the end of Consolidation. His course has been complicated by extensive bilateral DVT and severe necrotizing pancreatitis. He completed treatment with lovenox. Recent abdominal CT shows continued improvement in regards to the sequelae from his pancreatitis.  Asparaginase was permanently discontinued from his treatment regimen. He is on Vit D for deficiency.      He comes to clinic today for Day 1 Cycle 2 of Maintenance therapy. Although his weight is down today, he reports his nausea is much improved and is eating better so hopefully his weight will begin to trend back up. Last month, allopurinol was added and his 6-MP dose reduced to try and decrease 6-MP hepatic toxicity and increase therapeutic efficacy. He has had no problems with this change. He has been on this dosing for about 3 weeks. Today his ANC is above the goal range but his platelets have trended down. Given the relatively recent addition of allopurinol and down-trending platelets I won't change his 6-MP dosing today but he may need a dose increase at the next visit if his ANC remains above target.     Plan:   1) Labs reviewed with Lazaro. TGNS pending.   2) LP/IT methotrexate. Vincristine in clinic. Refills provided of methotrexate, 6-MP, and steroids. Reviewed dosing.   3) Continue protonix.  4) Continue to monitor for new symptoms of thrombosis while off of lovenox. Low threshold to repeat U/S if any signs of thrombus.   5) Given significant spinal headache history I used a pencil tip Slava needle today and he received caffeine.   6) Continue Vit D 3 2000 IU daily (last level was 19 on 3/5/20).  Recheck level  in August.  7) Nausea much improved but will continue to monitor and he will call if worsens again.  8) Bactrim for PCP prophylaxis.  9) Continue to check CMP and TGNs monthly moving forward.  10) Didn't need ativan prior to clinic today for anticipatory vomiting, but it remains an option.  11) RTC in 4 weeks for D29 of Cycle 2.    Todd Mercado MD/PhD  Pediatric Oncology

## 2020-07-09 NOTE — ANESTHESIA POSTPROCEDURE EVALUATION
Anesthesia POST Procedure Evaluation    Patient: Lazaro Lund   MRN:     [id number] Gender:   male   Age:    20 year old :      2000        Preoperative Diagnosis: Lymphoma (H) [C85.90]   Procedure(s):  Lumbar puncture with intrathecal Chemotherapy (CD)   Postop Comments: No value filed.     Anesthesia Type: MAC       Disposition: Outpatient   Postop Pain Control: Uneventful            Sign Out: Well controlled pain   PONV: No   Neuro/Psych: Uneventful            Sign Out: Acceptable/Baseline neuro status   Airway/Respiratory: Uneventful            Sign Out: AIRWAY IN SITU/Resp. Support   CV/Hemodynamics: Uneventful            Sign Out: Acceptable CV status   Other NRE: NONE   DID A NON-ROUTINE EVENT OCCUR? No         Last Anesthesia Record Vitals:  CRNA VITALS  2020 1245 - 2020 1345      2020             NIBP:  (!) 88/43    Pulse:  76    Temp:  36.5  C (97.7  F)    SpO2:  100 %    Resp Rate (observed):  16          Last PACU Vitals:  Vitals Value Taken Time   BP 93/43 2020  1:30 PM   Temp 36.6  C (97.8  F) 2020  1:30 PM   Pulse 74 2020  1:30 PM   Resp 18 2020  1:30 PM   SpO2 100 % 2020  1:30 PM   Temp src     NIBP 88/43 2020  1:19 PM   Pulse 76 2020  1:19 PM   SpO2 100 % 2020  1:19 PM   Resp     Temp 36.5  C (97.7  F) 2020  1:19 PM   Ht Rate 74 2020  1:17 PM   Temp 2           Electronically Signed By: Isaiah Horowitz MD, 2020, 2:37 PM

## 2020-07-13 ENCOUNTER — TELEPHONE (OUTPATIENT)
Dept: PEDIATRIC HEMATOLOGY/ONCOLOGY | Facility: CLINIC | Age: 20
End: 2020-07-13

## 2020-07-13 NOTE — TELEPHONE ENCOUNTER
HONEY placed follow-up phone call to Lazaro this afternoon to check-in and offer support following sending him therapy resources a couple of weeks ago. Lazaro shared that since we last talked he has started working again (M/W/F, 7-3 pm). He has started going to the gym a few times a week and is seeing his friends regularly. He noted quite an improvement in his mood since starting these activities. He explained that prior to this he spent a good majority of his time playing video games and watching You Tube. He talked about that quarantine and being isolated from so many things. He noted an interest in connecting with one of the therapy resources HONEY provided. We discussed how to reach out (via phone/e-mail) and asking for a consultation via phone prior to beginning any formal therapy with a provider to assess therapeutic fit and his comfort level. Lazaro appeared more engaged during our phone conversation than in earlier conversations. He was eager to talk about how much better he has been feeling (mood wise). He plans to follow-up with therapy resources. He will reach out to writer should any immediate questions/concerns arise. HONEY will plan to follow-up when Lazaro is in clinic next. Social work will continue to assess needs and provide ongoing psychosocial support and access to resources.      Ermelinda Aquino, CHEY, LICSW, OSW-C  Clinical    Pediatric Hematology Oncology   [facility]   Monday-Thursday   Phone: [phone]  Pager: [phone]    NO LETTER

## 2020-07-15 RX ORDER — ALLOPURINOL 100 MG/1
100 TABLET ORAL DAILY
Qty: 30 TABLET | Refills: 11 | Status: ON HOLD | OUTPATIENT
Start: 2020-07-15 | End: 2020-08-06

## 2020-07-22 LAB
6-TGN ENTSUB RBC: NORMAL
6MMP ENTSUB RBC: NORMAL

## 2020-08-05 ENCOUNTER — ANESTHESIA EVENT (OUTPATIENT)
Dept: PEDIATRICS | Facility: CLINIC | Age: 20
End: 2020-08-05
Payer: COMMERCIAL

## 2020-08-05 NOTE — ANESTHESIA PREPROCEDURE EVALUATION
Anesthesia Pre-Procedure Evaluation    Patient: Lazaro Lund   MRN:     [id number] Gender:   male   Age:    20 year old :      2000        Preoperative Diagnosis: Lymphoma (H) [C85.90]   Procedure(s):  Lumbar puncture with intrathecal Chemotherapy (not CD)     LABS:  CBC:   Lab Results   Component Value Date    WBC 3.6 (L) 2020    WBC 1.8 (L) 2020    HGB 12.9 (L) 2020    HGB 10.7 (L) 2020    HCT 37.6 (L) 2020    HCT 31.5 (L) 2020     (L) 2020     2020     BMP:   Lab Results   Component Value Date     2020     2020    POTASSIUM 3.7 2020    POTASSIUM 4.1 2020    CHLORIDE 110 (H) 2020    CHLORIDE 108 2020    CO2 29 2020    CO2 27 2020    BUN 10 2020    BUN 17 2020    CR 0.70 2020    CR 0.68 2020     (H) 2020    GLC 96 2020     COAGS:   Lab Results   Component Value Date    PTT 45 (H) 2019    INR 1.09 2019    FIBR 293 2019     POC:   Lab Results   Component Value Date    BGM 87 2019     OTHER:   Lab Results   Component Value Date    LACT 0.4 (L) 2019    MICHAEL 8.6 2020    PHOS 4.2 2019    MAG 2.1 2019    ALBUMIN 4.0 2020    PROTTOTAL 6.4 (L) 2020    ALT 25 2020    AST 19 2020    ALKPHOS 64 2020    BILITOTAL 0.4 2020    LIPASE 21 (L) 2020    AMYLASE 24 (L) 2020    .0 (H) 2019        Preop Vitals    BP Readings from Last 3 Encounters:   20 93/70   20 134/61   20 126/83    Pulse Readings from Last 3 Encounters:   20 63   20 78   20 71      Resp Readings from Last 3 Encounters:   20 16   20 16   20 16    SpO2 Readings from Last 3 Encounters:   20 98%   20 99%   20 99%      Temp Readings from Last 1 Encounters:   20 36.5  C (97.7  F) (Oral)    Ht Readings from Last 1  "Encounters:   07/09/20 1.845 m (6' 0.64\")      Wt Readings from Last 1 Encounters:   07/09/20 84.1 kg (185 lb 6.5 oz)    Estimated body mass index is 24.71 kg/m  as calculated from the following:    Height as of 7/9/20: 1.845 m (6' 0.64\").    Weight as of 7/9/20: 84.1 kg (185 lb 6.5 oz).     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      Past Surgical History:   Procedure Laterality Date     BONE MARROW BIOPSY, BONE SPECIMEN, NEEDLE/TROCAR Left 9/1/2019    Procedure: BIOPSY, BONE MARROW;  Surgeon: Heather Lopez MD;  [facility]     INSERT PICC LINE N/A 8/31/2019    Procedure: INSERTION, PICC;  Surgeon: Michell Keith MD;  [facility]     INSERT PORT VASCULAR ACCESS N/A 10/24/2019    Procedure: INSERTION, VASCULAR ACCESS PORT;  Surgeon: Silviano Martins MD;  [facility] P[facility]      IR CHEST PORT PLACEMENT > 5 YRS OF AGE  10/24/2019     IR CHEST TUBE PLACEMENT NON-TUNNELLED LEFT  8/31/2019     IR PICC PLACEMENT > 5 YRS OF AGE  8/31/2019     IR PORT CHECK RIGHT  11/12/2019     SPINAL PUNCTURE,LUMBAR, INTRATHECAL CHEMO DELIVERY N/A 8/31/2019    Procedure: LUMBAR PUNCTURE, WITH INTRATHECAL CHEMOTHERAPY ADMINISTRATION;  Surgeon: Heather Lopez MD;  [facility]     SPINAL PUNCTURE,LUMBAR, INTRATHECAL CHEMO DELIVERY N/A 9/9/2019    Procedure: Lumbar Puncture With Intrathecal Chemo;  Surgeon: Alexi Hayes MD;  [facility]     SPINAL PUNCTURE,LUMBAR, INTRATHECAL CHEMO " DELIVERY N/A 10/10/2019    Procedure: Lumbar puncture with IT Chemo (CD);  Surgeon: Reynaldo Campuzano MD;  [facility]      SPINAL PUNCTURE,LUMBAR, INTRATHECAL CHEMO DELIVERY N/A 10/17/2019    Procedure: Lumbar puncture with IT Chemo (not CD);  Surgeon: Reynaldo Campuzano MD;  [facility]      SPINAL PUNCTURE,LUMBAR, INTRATHECAL CHEMO DELIVERY N/A 10/24/2019    Procedure: LUMBAR PUNCTURE, WITH INTRATHECAL CHEMOTHERAPY ADMINISTRATION;  Surgeon: Félix Van APRN CNP;  [facility]      SPINAL PUNCTURE,LUMBAR, INTRATHECAL CHEMO DELIVERY N/A 10/31/2019    Procedure: Lumbar puncture with IT Chemo (not CD);  Surgeon: Reynaldo Campuzano MD;  [facility]      SPINAL PUNCTURE,LUMBAR, INTRATHECAL CHEMO DELIVERY N/A 12/19/2019    Procedure: Lumbar puncture with IT Chem (CD);  Surgeon: Félix Van APRN CNP;  [facility]      SPINAL PUNCTURE,LUMBAR, INTRATHECAL CHEMO DELIVERY N/A 12/23/2019    Procedure: Lumbar puncture with IT Chem (CD);  Surgeon: Heather Lopez MD;  [facility]      SPINAL PUNCTURE,LUMBAR, INTRATHECAL CHEMO DELIVERY N/A 1/23/2020    Procedure: Lumbar puncture with IT Chem (CD);  Surgeon: Reynaldo Campuzano MD;  [facility]      SPINAL PUNCTURE,LUMBAR, INTRATHECAL CHEMO DELIVERY N/A 2/20/2020    Procedure: Lumbar puncture with intrathecal Chemotherapy (CD);  Surgeon: Reynaldo Campuzano MD;  [facility]      SPINAL PUNCTURE,LUMBAR, INTRATHECAL CHEMO DELIVERY N/A 3/19/2020    Procedure: Lumbar puncture with intrathecal Chemotherapy (CD);  Surgeon: Reynaldo Campuzano MD;  [facility]      SPINAL PUNCTURE,LUMBAR, INTRATHECAL CHEMO DELIVERY N/A 3/26/2020    Procedure: Lumbar puncture with intrathecal Chemotherapy (not CD);  Surgeon: Todd Mercado MD;  [facility]      SPINAL PUNCTURE,LUMBAR, INTRATHECAL  CHEMO DELIVERY N/A 4/23/2020    Procedure: Lumbar puncture with intrathecal Chemotherapy (CD);  Surgeon: Marleny Brewer MD;  [facility]      SPINAL PUNCTURE,LUMBAR, INTRATHECAL CHEMO DELIVERY N/A 5/14/2020    Procedure: Lumbar puncture with intrathecal Chemotherapy (not CD);  Surgeon: Todd Mercado MD;  [facility]      SPINAL PUNCTURE,LUMBAR, INTRATHECAL CHEMO DELIVERY N/A 7/9/2020    Procedure: Lumbar puncture with intrathecal Chemotherapy (CD);  Surgeon: Todd Mercado MD;  [facility]      THORACENTESIS N/A 8/31/2019    Procedure: Thoracentesis;  Surgeon: Michell Keith MD;  [facility]      Allergies   Allergen Reactions     Asparaginase Derivatives Other (See Comments)     Severe pancreatitis     No Known Drug Allergies         Anesthesia Evaluation    ROS/Med Hx    No history of anesthetic complications  (-) malignant hyperthermia and tuberculosis  Comments: 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 He has done well with anesthesia cares. He did have DVT and was on lovenox;     Met with Lazaro; he has been NPO and denies any new issues or COVID exposure; is asymptomatic.     Cardiovascular Findings - negative ROS    Neuro Findings - negative ROS    Pulmonary Findings   (-) asthma and apnea    HENT Findings - negative HENT ROS    Skin Findings - negative skin ROS      GI/Hepatic/Renal Findings   (-) GERD    Endocrine/Metabolic Findings - negative ROS      Genetic/Syndrome Findings - negative genetics/syndromes ROS    Hematology/Oncology Findings   (+) cancer    Additional Notes  Allergies:   -- Asparaginase Derivatives -- Other (See Comments)    --  Severe pancreatitis       Current Outpatient Medications:  allopurinol (ZYLOPRIM) 100 MG tablet  diphenhydrAMINE (BENADRYL) 25 MG capsule  lidocaine-prilocaine (EMLA) 2.5-2.5 % external cream  LORazepam (ATIVAN) 1 MG  tablet  mercaptopurine (PURINETHOL) 50 MG tablet  mercaptopurine (PURINETHOL) 50 MG tablet  methotrexate 2.5 MG tablet  methotrexate 2.5 MG tablet  ondansetron (ZOFRAN-ODT) 8 MG ODT tab  pantoprazole (PROTONIX) 40 MG EC tablet  polyethylene glycol (MIRALAX/GLYCOLAX) packet  predniSONE (DELTASONE) 10 MG tablet  predniSONE (DELTASONE) 10 MG tablet  scopolamine (TRANSDERM) 1 MG/3DAYS 72 hr patch  scopolamine (TRANSDERM) 1 MG/3DAYS 72 hr patch  sennosides (SENOKOT) 8.6 MG tablet  sulfamethoxazole-trimethoprim (BACTRIM DS) 800-160 MG tablet  Vitamin D, Cholecalciferol, 25 MCG (1000 UT) TABS            PHYSICAL EXAM:   Mental Status/Neuro: A/A/O   Airway: Facies: Feasible  Mallampati: II  Mouth/Opening: Full  TM distance: > 6 cm  Neck ROM: Full   Respiratory: Auscultation: CTAB     Resp. Rate: Normal     Resp. Effort: Normal      CV: Rhythm: Regular  Rate: Age appropriate  Heart: Normal Sounds  Edema: None   Comments:      Dental: Details                  Assessment:   ASA SCORE: 2    H&P: History and physical reviewed and following examination; no interval change.    NPO Status: NPO Appropriate     Plan:   Anes. Type:  MAC   Pre-Medication: None   Induction:  IV (Standard)   Airway: Native Airway   Access/Monitoring: PIV   Maintenance: Propofol Sedation     Postop Plan:   Postop Pain: None  Postop Sedation/Airway: Not planned  Disposition: Outpatient     PONV Management:    Prevention:, Propofol     CONSENT: Direct conversation   Plan and risks discussed with: Patient   Blood Products: Consent Deferred (Minimal Blood Loss)       Comments for Plan/Consent:  He requests sedation. Procedures and risks explained. He understood and consented. Qs answered.            Miles Liu MD

## 2020-08-06 ENCOUNTER — ANESTHESIA (OUTPATIENT)
Dept: PEDIATRICS | Facility: CLINIC | Age: 20
End: 2020-08-06
Payer: COMMERCIAL

## 2020-08-06 ENCOUNTER — HOSPITAL ENCOUNTER (OUTPATIENT)
Facility: CLINIC | Age: 20
Discharge: HOME | End: 2020-08-06
Attending: PEDIATRICS | Admitting: PEDIATRICS
Payer: COMMERCIAL

## 2020-08-06 ENCOUNTER — OFFICE VISIT (OUTPATIENT)
Dept: PEDIATRIC HEMATOLOGY/ONCOLOGY | Facility: CLINIC | Age: 20
End: 2020-08-06
Attending: PEDIATRICS
Payer: COMMERCIAL

## 2020-08-06 ENCOUNTER — INFUSION THERAPY VISIT (OUTPATIENT)
Dept: INFUSION THERAPY | Facility: CLINIC | Age: 20
End: 2020-08-06
Attending: PEDIATRICS
Payer: COMMERCIAL

## 2020-08-06 VITALS
OXYGEN SATURATION: 98 % | SYSTOLIC BLOOD PRESSURE: 135 MMHG | WEIGHT: 180.12 LBS | DIASTOLIC BLOOD PRESSURE: 54 MMHG | RESPIRATION RATE: 18 BRPM | HEART RATE: 74 BPM | HEIGHT: 73 IN | TEMPERATURE: 97.8 F | BODY MASS INDEX: 23.87 KG/M2

## 2020-08-06 VITALS
BODY MASS INDEX: 23.87 KG/M2 | TEMPERATURE: 98 F | RESPIRATION RATE: 21 BRPM | OXYGEN SATURATION: 95 % | HEIGHT: 73 IN | DIASTOLIC BLOOD PRESSURE: 38 MMHG | HEART RATE: 72 BPM | SYSTOLIC BLOOD PRESSURE: 104 MMHG | WEIGHT: 180.12 LBS

## 2020-08-06 DIAGNOSIS — C83.50 T LYMPHOBLASTIC LYMPHOMA (H): Primary | ICD-10-CM

## 2020-08-06 DIAGNOSIS — C83.50 T LYMPHOBLASTIC LYMPHOMA (H): ICD-10-CM

## 2020-08-06 LAB
ALBUMIN SERPL-MCNC: 4 G/DL (ref 3.4–5)
ALP SERPL-CCNC: 63 U/L (ref 40–150)
ALT SERPL W P-5'-P-CCNC: 24 U/L (ref 0–70)
ANION GAP SERPL CALCULATED.3IONS-SCNC: 4 MMOL/L (ref 3–14)
AST SERPL W P-5'-P-CCNC: 10 U/L (ref 0–45)
BASOPHILS # BLD AUTO: 0 10E9/L (ref 0–0.2)
BASOPHILS NFR BLD AUTO: 0.5 %
BILIRUB SERPL-MCNC: 0.8 MG/DL (ref 0.2–1.3)
BUN SERPL-MCNC: 13 MG/DL (ref 7–30)
CALCIUM SERPL-MCNC: 8.6 MG/DL (ref 8.5–10.1)
CHLORIDE SERPL-SCNC: 109 MMOL/L (ref 94–109)
CO2 SERPL-SCNC: 28 MMOL/L (ref 20–32)
CREAT SERPL-MCNC: 0.68 MG/DL (ref 0.66–1.25)
DIFFERENTIAL METHOD BLD: ABNORMAL
EOSINOPHIL # BLD AUTO: 0 10E9/L (ref 0–0.7)
EOSINOPHIL NFR BLD AUTO: 0.8 %
ERYTHROCYTE [DISTWIDTH] IN BLOOD BY AUTOMATED COUNT: 13.3 % (ref 10–15)
GFR SERPL CREATININE-BSD FRML MDRD: >90 ML/MIN/{1.73_M2}
GLUCOSE SERPL-MCNC: 97 MG/DL (ref 70–99)
HCT VFR BLD AUTO: 38.7 % (ref 40–53)
HGB BLD-MCNC: 13.3 G/DL (ref 13.3–17.7)
IMM GRANULOCYTES # BLD: 0 10E9/L (ref 0–0.4)
IMM GRANULOCYTES NFR BLD: 0.3 %
LYMPHOCYTES # BLD AUTO: 0.4 10E9/L (ref 0.8–5.3)
LYMPHOCYTES NFR BLD AUTO: 11.5 %
MCH RBC QN AUTO: 34.5 PG (ref 26.5–33)
MCHC RBC AUTO-ENTMCNC: 34.4 G/DL (ref 31.5–36.5)
MCV RBC AUTO: 100 FL (ref 78–100)
MONOCYTES # BLD AUTO: 0.2 10E9/L (ref 0–1.3)
MONOCYTES NFR BLD AUTO: 6.3 %
NEUTROPHILS # BLD AUTO: 2.9 10E9/L (ref 1.6–8.3)
NEUTROPHILS NFR BLD AUTO: 80.6 %
NRBC # BLD AUTO: 0 10*3/UL
NRBC BLD AUTO-RTO: 0 /100
PLATELET # BLD AUTO: 198 10E9/L (ref 150–450)
POTASSIUM SERPL-SCNC: 3.7 MMOL/L (ref 3.4–5.3)
PROT SERPL-MCNC: 6.5 G/DL (ref 6.8–8.8)
RBC # BLD AUTO: 3.86 10E12/L (ref 4.4–5.9)
SODIUM SERPL-SCNC: 141 MMOL/L (ref 133–144)
WBC # BLD AUTO: 3.6 10E9/L (ref 4–11)

## 2020-08-06 PROCEDURE — 25800030 ZZH RX IP 258 OP 636: Mod: ZF | Performed by: NURSE PRACTITIONER

## 2020-08-06 PROCEDURE — 96450 CHEMOTHERAPY INTO CNS: CPT | Performed by: PEDIATRICS

## 2020-08-06 PROCEDURE — 25000128 H RX IP 250 OP 636: Performed by: NURSE ANESTHETIST, CERTIFIED REGISTERED

## 2020-08-06 PROCEDURE — 82306 VITAMIN D 25 HYDROXY: CPT | Performed by: NURSE PRACTITIONER

## 2020-08-06 PROCEDURE — 25000128 H RX IP 250 OP 636: Mod: ZF | Performed by: NURSE PRACTITIONER

## 2020-08-06 PROCEDURE — 25000128 H RX IP 250 OP 636: Performed by: NURSE PRACTITIONER

## 2020-08-06 PROCEDURE — 85025 COMPLETE CBC W/AUTO DIFF WBC: CPT | Performed by: NURSE PRACTITIONER

## 2020-08-06 PROCEDURE — 25800030 ZZH RX IP 258 OP 636: Performed by: NURSE ANESTHETIST, CERTIFIED REGISTERED

## 2020-08-06 PROCEDURE — 80053 COMPREHEN METABOLIC PANEL: CPT | Performed by: NURSE PRACTITIONER

## 2020-08-06 PROCEDURE — 25000128 H RX IP 250 OP 636: Mod: ZF

## 2020-08-06 PROCEDURE — 96409 CHEMO IV PUSH SNGL DRUG: CPT

## 2020-08-06 PROCEDURE — 36591 DRAW BLOOD OFF VENOUS DEVICE: CPT | Performed by: PEDIATRICS

## 2020-08-06 PROCEDURE — 80299 QUANTITATIVE ASSAY DRUG: CPT | Performed by: NURSE PRACTITIONER

## 2020-08-06 PROCEDURE — 89050 BODY FLUID CELL COUNT: CPT | Performed by: NURSE PRACTITIONER

## 2020-08-06 PROCEDURE — 40000165 ZZH STATISTIC POST-PROCEDURE RECOVERY CARE: Performed by: PEDIATRICS

## 2020-08-06 PROCEDURE — 25000125 ZZHC RX 250: Performed by: NURSE ANESTHETIST, CERTIFIED REGISTERED

## 2020-08-06 PROCEDURE — 25800030 ZZH RX IP 258 OP 636: Mod: ZF | Performed by: PEDIATRICS

## 2020-08-06 PROCEDURE — 40001011 ZZH STATISTIC PRE-PROCEDURE NURSING ASSESSMENT: Performed by: PEDIATRICS

## 2020-08-06 PROCEDURE — 37000008 ZZH ANESTHESIA TECHNICAL FEE, 1ST 30 MIN: Performed by: PEDIATRICS

## 2020-08-06 PROCEDURE — 25800030 ZZH RX IP 258 OP 636: Performed by: NURSE PRACTITIONER

## 2020-08-06 RX ORDER — PROPOFOL 10 MG/ML
INJECTION, EMULSION INTRAVENOUS CONTINUOUS PRN
Status: DISCONTINUED | OUTPATIENT
Start: 2020-08-06 | End: 2020-08-06

## 2020-08-06 RX ORDER — ONDANSETRON 2 MG/ML
INJECTION INTRAMUSCULAR; INTRAVENOUS PRN
Status: DISCONTINUED | OUTPATIENT
Start: 2020-08-06 | End: 2020-08-06

## 2020-08-06 RX ORDER — HEPARIN SODIUM,PORCINE 10 UNIT/ML
VIAL (ML) INTRAVENOUS
Status: DISCONTINUED
Start: 2020-08-06 | End: 2020-08-06 | Stop reason: HOSPADM

## 2020-08-06 RX ORDER — LIDOCAINE 40 MG/G
CREAM TOPICAL
Status: DISCONTINUED
Start: 2020-08-06 | End: 2020-08-06 | Stop reason: HOSPADM

## 2020-08-06 RX ORDER — SULFAMETHOXAZOLE/TRIMETHOPRIM 800-160 MG
1 TABLET ORAL
Qty: 16 TABLET | Refills: 11 | Status: SHIPPED | OUTPATIENT
Start: 2020-08-10 | End: 2021-08-10

## 2020-08-06 RX ORDER — HEPARIN SODIUM,PORCINE 10 UNIT/ML
5 VIAL (ML) INTRAVENOUS ONCE
Status: DISCONTINUED | OUTPATIENT
Start: 2020-08-06 | End: 2020-08-06 | Stop reason: HOSPADM

## 2020-08-06 RX ORDER — MERCAPTOPURINE 50 MG/1
50 TABLET ORAL DAILY
Qty: 26 TABLET | Refills: 2 | Status: ON HOLD | OUTPATIENT
Start: 2020-08-06 | End: 2020-10-06

## 2020-08-06 RX ORDER — SODIUM CHLORIDE, SODIUM LACTATE, POTASSIUM CHLORIDE, CALCIUM CHLORIDE 600; 310; 30; 20 MG/100ML; MG/100ML; MG/100ML; MG/100ML
INJECTION, SOLUTION INTRAVENOUS CONTINUOUS PRN
Status: DISCONTINUED | OUTPATIENT
Start: 2020-08-06 | End: 2020-08-06

## 2020-08-06 RX ORDER — CAFFEINE AND SODIUM BENZOATE 125 MG/ML
500 INJECTION, SOLUTION INTRAMUSCULAR; INTRAVENOUS ONCE
Status: DISCONTINUED | OUTPATIENT
Start: 2020-08-06 | End: 2020-08-06

## 2020-08-06 RX ORDER — LIDOCAINE 40 MG/G
2.5 CREAM TOPICAL
Status: DISCONTINUED | OUTPATIENT
Start: 2020-08-06 | End: 2020-08-06 | Stop reason: HOSPADM

## 2020-08-06 RX ORDER — PROPOFOL 10 MG/ML
INJECTION, EMULSION INTRAVENOUS PRN
Status: DISCONTINUED | OUTPATIENT
Start: 2020-08-06 | End: 2020-08-06

## 2020-08-06 RX ORDER — HEPARIN SODIUM (PORCINE) LOCK FLUSH IV SOLN 100 UNIT/ML 100 UNIT/ML
5 SOLUTION INTRAVENOUS
Status: DISCONTINUED | OUTPATIENT
Start: 2020-08-06 | End: 2020-08-06 | Stop reason: HOSPADM

## 2020-08-06 RX ORDER — HEPARIN SODIUM (PORCINE) LOCK FLUSH IV SOLN 100 UNIT/ML 100 UNIT/ML
SOLUTION INTRAVENOUS
Status: COMPLETED
Start: 2020-08-06 | End: 2020-08-06

## 2020-08-06 RX ORDER — ALLOPURINOL 100 MG/1
100 TABLET ORAL DAILY
Qty: 30 TABLET | Refills: 11 | Status: SHIPPED | OUTPATIENT
Start: 2020-08-06 | End: 2020-10-08

## 2020-08-06 RX ADMIN — SODIUM CHLORIDE: 9 INJECTION INTRAMUSCULAR; INTRAVENOUS; SUBCUTANEOUS at 12:05

## 2020-08-06 RX ADMIN — PROPOFOL 50 MG: 10 INJECTION, EMULSION INTRAVENOUS at 12:04

## 2020-08-06 RX ADMIN — DEXMEDETOMIDINE HYDROCHLORIDE 20 MCG: 100 INJECTION, SOLUTION INTRAVENOUS at 12:01

## 2020-08-06 RX ADMIN — HEPARIN 5 ML: 100 SYRINGE at 14:28

## 2020-08-06 RX ADMIN — HEPARIN SODIUM (PORCINE) LOCK FLUSH IV SOLN 100 UNIT/ML 5 ML: 100 SOLUTION at 14:28

## 2020-08-06 RX ADMIN — VINCRISTINE SULFATE 2 MG: 1 INJECTION, SOLUTION INTRAVENOUS at 14:23

## 2020-08-06 RX ADMIN — ONDANSETRON 4 MG: 2 INJECTION INTRAMUSCULAR; INTRAVENOUS at 12:12

## 2020-08-06 RX ADMIN — PROPOFOL 250 MCG/KG/MIN: 10 INJECTION, EMULSION INTRAVENOUS at 12:01

## 2020-08-06 RX ADMIN — SODIUM CHLORIDE, POTASSIUM CHLORIDE, SODIUM LACTATE AND CALCIUM CHLORIDE: 600; 310; 30; 20 INJECTION, SOLUTION INTRAVENOUS at 12:01

## 2020-08-06 RX ADMIN — SODIUM CHLORIDE 50 ML: 9 INJECTION, SOLUTION INTRAVENOUS at 14:22

## 2020-08-06 RX ADMIN — PROPOFOL 50 MG: 10 INJECTION, EMULSION INTRAVENOUS at 12:01

## 2020-08-06 RX ADMIN — PROPOFOL 40 MG: 10 INJECTION, EMULSION INTRAVENOUS at 12:05

## 2020-08-06 RX ADMIN — PROPOFOL 60 MG: 10 INJECTION, EMULSION INTRAVENOUS at 12:09

## 2020-08-06 ASSESSMENT — PAIN SCALES - GENERAL: PAINLEVEL: NO PAIN (0)

## 2020-08-06 ASSESSMENT — ENCOUNTER SYMPTOMS: APNEA: 0

## 2020-08-06 ASSESSMENT — MIFFLIN-ST. JEOR
SCORE: 1864.49
SCORE: 1864.49

## 2020-08-06 NOTE — DISCHARGE INSTRUCTIONS
[facility]   [phone]    Care post Lumbar Puncture     Do not remove bandage/dressing for 24 hours -- after this time they can be removed    No bath, shower or soaking of the dressing for 24 hours    Activity as tolerated by the patient    Diet as able to tolerated    May use Tylenol as needed for pain control -- DO NOT use Ibuprofen    Can apply icepack to the site for discomfort -- no more than 10 minutes at a time    If bleeding presents apply pressure for 5 minutes    Call [phone] ask for Peds BMT/Hem/Onc fellow on call if complications arise including:    persistent bleeding    fever greater than 100.5    Pain    Lumbar punctures can cause headache. If the pain is not controlled with Tylenol (acetaminophen) please call the Peds BMT/Hem/Onc fellow on call    Home Instructions for Your Child after Sedation  Today your child received (medicine):  Propofol, Precedex and Zofran  Please keep this form with your health records  Your child may be more sleepy and irritable today than normal. Wake your child up every 1 to 11/2 hours during the day. (This way, both you and your child will sleep through the night.) Also, an adult should stay with your child for the rest of the day. The medicine may make the child dizzy. Avoid activities that require balance (bike riding, skating, climbing stairs, walking).  Remember:    When your child wants to eat again, start with liquids (juice, soda pop, Popsicles). If your child feels well enough, you may try a regular diet. It is best to offer light meals for the first 24 hours.    If your child has nausea (feels sick to the stomach) or vomiting (throws up), give small amounts of clear liquids (7-Up, Sprite, apple juice or broth). Fluids are more important than food until your child is feeling better.    Wait 24 hours before giving medicine that contains alcohol. This includes liquid cold, cough and allergy medicines (Robitussin, Vicks Formula 44 for children,  Benadryl, Chlor-Trimeton).  Call your doctor if:    You have questions about the test results.    Your child vomits (throws up) more than two times.    If your child has trouble breathing, call [phone].  If you have any questions or concerns, please call:  Pediatric Sedation Unit [phone]  Pediatric clinic  [phone]  [facility]  [phone] (ask for the Heme/Onc doctor on call)  Emergency department [phone]  [facility] toll-free number [phone] (Monday--Friday, 8 a.m. to 4:30 p.m.)  I understand these instructions. I have all of my personal belongings.

## 2020-08-06 NOTE — PROGRESS NOTES
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 Lazaro presented with acute onset cough and was found to have an anterior mediastinal mass and malignant left-sided pleural effusion.  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  He was admitted to [facility] oncology service 8/30 and started on treatment per [id number].  He had a pleural effusion that required a chest tube and a pericardial effusion that was not drained. His Induction was complicated by cardiac compression secondary to his mass leading to tamponade, this improved through out his hospital stay.  He also had dysphagia that improved with treatment of his mass, swallow study was normal. His course was recently complicated by extensive bilateral UE DVTs, he remains on anticoagulation.  Most recently his course was complicated by the development of severe asparaginase induced pancreatitis. He became quite ill with SIRS and associated hypotension, he required pressor support in the ICU.  Fortunately Lazaro recovered well and his subsequent imaging has continued to show improvement.  He comes to clinic today for Day 29 of 2nd Maintenance cycle.     HPI:   Lazaro said that he has been doing very well. His nausea has been much better and he is able to eat. Though he lost 5 lb since last month, he said it was intentional with more exercise because he has been feeling better. No other complaints of pain. Sleeping well. No fevers or viral symptoms.  No bruising, bleeding, headaches, or vision changes. Mood has been stable.      Review of systems:  Pertinent positives reported in the HPI. All other systems in a complete and comprehensive review of systems were negative..     PMH:   Past Medical History        Past Medical History:   Diagnosis Date     Acute necrotizing pancreatitis 11/07/2019     attributed to asparaginase      Acute pancreatitis due to PEGaspariginase therapy  11/15/2019     DVT of upper extremity (deep vein thrombosis) (H) 09/26/2019     Bilateral      Edema of upper extremity 10/17/2019     Folliculitis 10/17/2019     Migraine 2006     have resolved     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      -- Spinal HA following LP  -- TPMT shows Intermediate activity with normal TPMT/NUDT15 genotype  -- Factor V leiden and prothrombin gene mutation negative  -- Necrotizing pancreatitis secondary to asparaginase  -former smoke, quit with the use of nicotine patches.     PF:   Family History         Family History   Problem Relation Age of Onset     No Known Problems Mother       No Known Problems Father       Asthma Brother       Thyroid Cancer Paternal Grandmother       Melanoma Paternal Aunt          Social History: Lazaro previously lived at home with his dad and step mom in [place] but is now staying with his grandma in [place].  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  He has been enjoying fishing and video games.     Current Medications:  Prior to Admission medications    Medication Sig Start Date End Date Taking? Authorizing Provider   allopurinol (ZYLOPRIM) 100 MG tablet Take 1 tablet (100 mg) by mouth daily 6/9/20   Félix Van APRN CNP   diphenhydrAMINE (BENADRYL) 25 MG capsule Take 1-2 capsules (25-50 mg) by mouth every 6 hours as needed (Breakthrough Nausea and Vomiting ) 10/3/19   Félix Van APRN CNP   lidocaine-prilocaine (EMLA) 2.5-2.5 % external cream Apply topically as needed for other (prior to port access) 4/9/20   Félix Van APRN CNP   LORazepam (ATIVAN) 1 MG tablet Take 1 tablet prior to clinic visits for anticipatory vomiting. 6/9/20   Félix Van APRN CNP   mercaptopurine (PURINETHOL) 50 MG tablet Take 1 tablet (50mg) four days per week and 1/2 tablet (25mg) three days/week. 7/9/20   Félix Van APRN CNP   mercaptopurine (PURINETHOL) 50 MG  tablet Take 1 tablet (50mg) four days per week and 1/2 tablet (25mg) three days/week. 6/9/20   Félix Van APRN CNP   methotrexate 2.5 MG tablet Take 17 tablets (42.5 mg) by mouth once a week Do not take on Days of LP. 7/9/20   Félix Van APRN CNP   methotrexate 2.5 MG tablet Take 17 tablets (42.5 mg) by mouth once a week Do not take on Days of LP. 6/3/20   Félix Van APRN CNP   ondansetron (ZOFRAN-ODT) 8 MG ODT tab Take 1 tablet (8 mg) by mouth every 8 hours as needed for nausea 2/21/20   Deneen Diggs MD   pantoprazole (PROTONIX) 40 MG EC tablet Take 1 tablet (40 mg) by mouth every morning (before breakfast) 4/23/20   Félix Van APRN CNP   polyethylene glycol (MIRALAX/GLYCOLAX) packet Take 17 g by mouth daily 11/8/19   Reynaldo Campuzano MD   predniSONE (DELTASONE) 10 MG tablet Take 45mg (4.5 tabs) in the AM and 40mg (4 tabs) in the PM for 5 days every 4 weeks. 7/9/20   Félix Van APRN CNP   predniSONE (DELTASONE) 10 MG tablet Take 45mg (4.5 tabs) in the AM and 40mg (4 tabs) in the PM for 5 days 4/23/20   Félix Van APRN CNP   scopolamine (TRANSDERM) 1 MG/3DAYS 72 hr patch Place 1 patch onto the skin every 72 hours 5/14/20   Félix Van APRN CNP   scopolamine (TRANSDERM) 1 MG/3DAYS 72 hr patch Place 1 patch onto the skin every 72 hours 12/19/19   Félix Van APRN CNP   sennosides (SENOKOT) 8.6 MG tablet Take 2 tablets by mouth 2 times daily as needed for constipation 11/8/19   Reynaldo Campuzano MD   sulfamethoxazole-trimethoprim (BACTRIM DS) 800-160 MG tablet Take 1 tablet by mouth Every Mon, Tues two times daily 4/27/20   Félix Van APRN CNP   Vitamin D, Cholecalciferol, 25 MCG (1000 UT) TABS Take 2 tablets (2,000 Units) by mouth daily 12/12/19   Nicho Fischer MD      Reviewed medications with Lazaro, he has not missed any doses of chemotherapy.     Received influenza vaccine for the 2426-6944 season.   "     Physical Exam:   BP Readings from Last 1 Encounters:   08/06/20 (!) 104/38      Pulse Readings from Last 1 Encounters:   08/06/20 72      Resp Readings from Last 1 Encounters:   08/06/20 21      Temp Readings from Last 1 Encounters:   08/06/20 98  F (36.7  C) (Oral)      SpO2 Readings from Last 1 Encounters:   08/06/20 95%      Wt Readings from Last 1 Encounters:   08/06/20 81.7 kg (180 lb 1.9 oz)      Ht Readings from Last 1 Encounters:   08/06/20 1.844 m (6' 0.6\")     General: Lazaro is alert, interactive and appropriate.  HEENT: Skull is atrauamatic and normocephalic.  Full head of hair. PERRLA, sclera are non icteric and not injected, EOM are intact. Nares are patent without drainage. Oropharynx clear, no plaques noted. Buccal mucosa and tongue clear. MMM.  Neck:  Supple without lymphadenopathy.   Lymph: There is no cervical, supraclavicular, axillary, lymphadenopathy palpated.  Cardiovascular:  HR is regular, S1, S2 no murmur.  Capillary refill is < 2 seconds.   Respiratory: No cough noted. Respirations are easy.  Lungs are clear to auscultation throughout.  No crackles or wheezes.  Gastrointestinal: BS present in all quadrants.  Abdomen is soft and flat.  No pain with palpation. No hepatosplenomegaly or masses are palpated.  Skin: Few scattered acne lesions on his lower back.  No surrounding erythema, no drainage. No other skin lesions noted.  Neurological:  CN 2-12 grossly intact. Gait is normal.  No issues with balance. Sensation intact in hands and feet.     Musculoskeletal:  Good strength and ROM in all extremities.  Strong dorsiflexion at ankles and great toes (5/5) bilaterally without any pain at the Achilles.     Labs:   Results for orders placed or performed during the hospital encounter of 08/06/20 (from the past 24 hour(s))   CBC with platelets differential   Result Value Ref Range    WBC 3.6 (L) 4.0 - 11.0 10e9/L    RBC Count 3.86 (L) 4.4 - 5.9 10e12/L    Hemoglobin 13.3 13.3 - 17.7 g/dL    " Hematocrit 38.7 (L) 40.0 - 53.0 %     78 - 100 fl    MCH 34.5 (H) 26.5 - 33.0 pg    MCHC 34.4 31.5 - 36.5 g/dL    RDW 13.3 10.0 - 15.0 %    Platelet Count 198 150 - 450 10e9/L    Diff Method Automated Method     % Neutrophils 80.6 %    % Lymphocytes 11.5 %    % Monocytes 6.3 %    % Eosinophils 0.8 %    % Basophils 0.5 %    % Immature Granulocytes 0.3 %    Nucleated RBCs 0 0 /100    Absolute Neutrophil 2.9 1.6 - 8.3 10e9/L    Absolute Lymphocytes 0.4 (L) 0.8 - 5.3 10e9/L    Absolute Monocytes 0.2 0.0 - 1.3 10e9/L    Absolute Eosinophils 0.0 0.0 - 0.7 10e9/L    Absolute Basophils 0.0 0.0 - 0.2 10e9/L    Abs Immature Granulocytes 0.0 0 - 0.4 10e9/L    Absolute Nucleated RBC 0.0    Comprehensive metabolic panel   Result Value Ref Range    Sodium 141 133 - 144 mmol/L    Potassium 3.7 3.4 - 5.3 mmol/L    Chloride 109 94 - 109 mmol/L    Carbon Dioxide 28 20 - 32 mmol/L    Anion Gap 4 3 - 14 mmol/L    Glucose 97 70 - 99 mg/dL    Urea Nitrogen 13 7 - 30 mg/dL    Creatinine 0.68 0.66 - 1.25 mg/dL    GFR Estimate >90 >60 mL/min/[1.73_m2]    GFR Estimate If Black >90 >60 mL/min/[1.73_m2]    Calcium 8.6 8.5 - 10.1 mg/dL    Bilirubin Total 0.8 0.2 - 1.3 mg/dL    Albumin 4.0 3.4 - 5.0 g/dL    Protein Total 6.5 (L) 6.8 - 8.8 g/dL    Alkaline Phosphatase 63 40 - 150 U/L    ALT 24 0 - 70 U/L    AST 10 0 - 45 U/L   Cell count with differential CSF:   Result Value Ref Range    WBC CSF 0 0 - 5 /uL    RBC CSF 0 0 - 2 /uL    Tube Number 1 #    Color CSF Colorless CLRL^Colorless    Appearance CSF Clear CLER^Clear     Procedures:   DESCRIPTION OF PROCEDURE: Informed consent for the procedure was obtained prior to starting. A time out was performed to identify the patient and the procedure. The patient was sedated by anesthesia. The patient then was placed in the lateral decubitus position. The skin overlying the lower spinal column was prepped and draped in a sterile fashion.  A 3.5 inch 22 gauge pencil tip (linda) spinal needle  was inserted into the L3-L4 spinal interspace and clear CSF was obtained. Six ml was removed and then intrathecal chemotherapy was then administered 15 mg of methotrexate.  The spinal needle was removed.  The patient tolerated the procedure well. CSF was sent for cell count and cytopathologic evaluation.  I was present for the procedure.  Todd Mercado MD/PhD    Assessment:  Lazaro Lund is a 20 year old young man with T cell lymphoblastic lymphoma (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  He is being treated per [facility] protocol [id number]. He's had a CRu following Induction with a CR at the end of Consolidation. His course has been complicated by extensive bilateral DVT and severe necrotizing pancreatitis. He completed treatment with lovenox. Recent abdominal CT shows continued improvement in regards to the sequelae from his pancreatitis.  Asparaginase was permanently discontinued from his treatment regimen. He is on Vit D for deficiency.      He comes to clinic today for Day 29 Cycle 2 of Maintenance therapy. Overall, he has been doing well with improved nausea. He reported his weight loss as intentional and his current weight still above his pre-illness baseline.    He has been on reduced dose 6-MP (25% of full dose) for ~7 weeks because he is on allopurinol for correction of skewed 6-MP metabolism, manifested with hepatotoxicity. As his ANC remains high today, we will increase his 6-MP dose slightly (~33% of the full dose).    Plan:   1) Labs reviewed with Lazaro.  2) LP/IT methotrexate. Vincristine in clinic. Refills provided of methotrexate, 6-MP, and steroids.   3) Increase 6-MP dose to 50 mg daily (~33% of the full dose). Reviewed dose change with Lazaro.   4) Continue protonix.  5) Continue to monitor for new symptoms of thrombosis while off of lovenox. Low threshold to repeat U/S if any signs of thrombus.   6) Continue Vit D 3 2000 IU daily (last level was 19 on 3/5/20).  Recheck level sent today.  7) Nausea much improved  but will continue to monitor and he will call if worsens again.  8) Bactrim for PCP prophylaxis.  9) Continue to check CMP and TGNs monthly moving forward.  10) Didn't need ativan prior to clinic today for anticipatory vomiting, but it remains an option.  11) RTC in 4 weeks for D57 of Cycle 2.    Patient was seen and the plans were discussed with Dr. Todd Ascencio MD  Fellow, Pediatric Hematology/Oncology    I saw and evaluated the patient with the fellow. I discussed the patient with the fellow and agree with the findings and plan as documented in the note. I personally spent a total of 45 minutes in the clinic in direct care of this patient. Total time included discussion with patient/family, physical examination, reviewing data such as laboratory and radiographic studies, and discussion with the fellow. Greater than 50% of the total time was spent counseling and/or coordinating the care of the patient. Details can be found in the fellow note.    Todd Mercado M.D./Ph.D  Pediatric Hematology/Oncology

## 2020-08-06 NOTE — ANESTHESIA POSTPROCEDURE EVALUATION
Anesthesia POST Procedure Evaluation    Patient: Lazaro Lund   MRN:     [id number] Gender:   male   Age:    20 year old :      2000        Preoperative Diagnosis: Lymphoma (H) [C85.90]   Procedure(s):  Lumbar puncture with intrathecal Chemotherapy (not CD)   Postop Comments: No value filed.     Anesthesia Type: MAC       Disposition: Outpatient   Postop Pain Control: Uneventful            Sign Out: Well controlled pain   PONV: No   Neuro/Psych: Uneventful            Sign Out: Acceptable/Baseline neuro status   Airway/Respiratory: Uneventful            Sign Out: AIRWAY IN SITU/Resp. Support   CV/Hemodynamics: Uneventful            Sign Out: Acceptable CV status   Other NRE: NONE   DID A NON-ROUTINE EVENT OCCUR? No    Event details/Postop Comments:  Awakening satisfactorily; strong; breathing well; oriented; comfortable; no complaints or complications; comfortable.          Last Anesthesia Record Vitals:  CRNA VITALS  2020 1145 - 2020 1245      2020             Temp:  36.7  C (98.1  F)    SpO2:  100 %    EKG:  Sinus rhythm          Last PACU Vitals:  Vitals Value Taken Time   BP 93/47 2020 12:45 PM   Temp 36.7  C (98  F) 2020 12:45 PM   Pulse 63 2020 12:45 PM   Resp 18 2020 12:45 PM   SpO2 99 % 2020 12:45 PM   Temp src     NIBP     Pulse     SpO2     Resp     Temp     Ht Rate     Temp 2           Electronically Signed By: Miles Liu MD, 2020, 1:16 PM

## 2020-08-06 NOTE — ANESTHESIA CARE TRANSFER NOTE
Patient: Lazaro Lund    Procedure(s):  Lumbar puncture with intrathecal Chemotherapy (not CD)    Diagnosis: Lymphoma (H) [C85.90]  Diagnosis Additional Information: No value filed.    Anesthesia Type:   MAC     Note:  Airway :Nasal Cannula  Patient transferred to: Recovery  Handoff Report: Identifed the Patient, Identified the Reponsible Provider, Reviewed the pertinent medical history, Discussed the surgical course, Reviewed Intra-OP anesthesia mangement and issues during anesthesia, Set expectations for post-procedure period and Allowed opportunity for questions and acknowledgement of understanding      Vitals: (Last set prior to Anesthesia Care Transfer)    CR[facility] VITALS  8/6/2020 1145 - 8/6/2020 1219      8/6/2020             Temp:  36.7  C (98.1  F)    SpO2:  100 %    EKG:  Sinus rhythm                Electronically Signed By: YOHAN Ramos CRNA  August 6, 2020  12:19 PM

## 2020-08-06 NOTE — LETTER
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 Lazaro presented with acute onset cough and was found to have an anterior mediastinal mass and malignant left-sided pleural effusion.  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  He was admitted to [facility] oncology service 8/30 and started on treatment per [id number].  He had a pleural effusion that required a chest tube and a pericardial effusion that was not drained. His Induction was complicated by cardiac compression secondary to his mass leading to tamponade, this improved through out his hospital stay.  He also had dysphagia that improved with treatment of his mass, swallow study was normal. His course was recently complicated by extensive bilateral UE DVTs, he remains on anticoagulation.  Most recently his course was complicated by the development of severe asparaginase induced pancreatitis. He became quite ill with SIRS and associated hypotension, he required pressor support in the ICU.  Fortunately Lazaro recovered well and his subsequent imaging has continued to show improvement.  He comes to clinic today for Day 29 of 2nd Maintenance cycle.     HPI:   Lazaro said that he has been doing very well. His nausea has been much better and he is able to eat. Though he lost 5 lb since last month, he said it was intentional with more exercise because he has been feeling better. No other complaints of pain. Sleeping well. No fevers or viral symptoms.  No bruising, bleeding, headaches, or vision changes. Mood has been stable.      Review of systems:  Pertinent positives reported in the HPI. All other systems in a complete and comprehensive review of systems were negative..     PMH:   Past Medical History        Past Medical History:   Diagnosis  Date     Acute necrotizing pancreatitis 11/07/2019     attributed to asparaginase     Acute pancreatitis due to PEGaspariginase therapy  11/15/2019     DVT of upper extremity (deep vein thrombosis) (H) 09/26/2019     Bilateral      Edema of upper extremity 10/17/2019     Folliculitis 10/17/2019     Migraine 2006     have resolved     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      -- Spinal HA following LP  -- TPMT shows Intermediate activity with normal TPMT/NUDT15 genotype  -- Factor V leiden and prothrombin gene mutation negative  -- Necrotizing pancreatitis secondary to asparaginase  -former smoke, quit with the use of nicotine patches.     PFMH:   Family History         Family History   Problem Relation Age of Onset     No Known Problems Mother       No Known Problems Father       Asthma Brother       Thyroid Cancer Paternal Grandmother       Melanoma Paternal Aunt          Social History: Lazaro previously lived at home with his dad and step mom in [place] but is now staying with his grandma in [place].  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  He has been enjoying fishing and video games.     Current Medications:  Prior to Admission medications    Medication Sig Start Date End Date Taking? Authorizing Provider   allopurinol (ZYLOPRIM) 100 MG tablet Take 1 tablet (100 mg) by mouth daily 6/9/20   Félix Van APRN CNP   diphenhydrAMINE (BENADRYL) 25 MG capsule Take 1-2 capsules (25-50 mg) by mouth every 6 hours as needed (Breakthrough Nausea and Vomiting ) 10/3/19   Félix Van APRN CNP   lidocaine-prilocaine (EMLA) 2.5-2.5 % external cream Apply topically as needed for other (prior to port access) 4/9/20   Félix Van APRN CNP   LORazepam (ATIVAN) 1 MG tablet Take 1 tablet prior to clinic visits for anticipatory vomiting. 6/9/20   Félix Van APRN CNP   mercaptopurine (PURINETHOL) 50 MG tablet Take 1 tablet (50mg) four days per week and 1/2 tablet (25mg) three  days/week. 7/9/20   Félix Van APRN CNP   mercaptopurine (PURINETHOL) 50 MG tablet Take 1 tablet (50mg) four days per week and 1/2 tablet (25mg) three days/week. 6/9/20   Félix Van APRN CNP   methotrexate 2.5 MG tablet Take 17 tablets (42.5 mg) by mouth once a week Do not take on Days of LP. 7/9/20   Félix Van APRN CNP   methotrexate 2.5 MG tablet Take 17 tablets (42.5 mg) by mouth once a week Do not take on Days of LP. 6/3/20   Félix Van APRN CNP   ondansetron (ZOFRAN-ODT) 8 MG ODT tab Take 1 tablet (8 mg) by mouth every 8 hours as needed for nausea 2/21/20   Deneen Diggs MD   pantoprazole (PROTONIX) 40 MG EC tablet Take 1 tablet (40 mg) by mouth every morning (before breakfast) 4/23/20   Félix Van APRN CNP   polyethylene glycol (MIRALAX/GLYCOLAX) packet Take 17 g by mouth daily 11/8/19   Reynaldo Campuzano MD   predniSONE (DELTASONE) 10 MG tablet Take 45mg (4.5 tabs) in the AM and 40mg (4 tabs) in the PM for 5 days every 4 weeks. 7/9/20   Félix Van APRN CNP   predniSONE (DELTASONE) 10 MG tablet Take 45mg (4.5 tabs) in the AM and 40mg (4 tabs) in the PM for 5 days 4/23/20   Félix Van APRN CNP   scopolamine (TRANSDERM) 1 MG/3DAYS 72 hr patch Place 1 patch onto the skin every 72 hours 5/14/20   Félix Van APRN CNP   scopolamine (TRANSDERM) 1 MG/3DAYS 72 hr patch Place 1 patch onto the skin every 72 hours 12/19/19   Félix Van APRN CNP   sennosides (SENOKOT) 8.6 MG tablet Take 2 tablets by mouth 2 times daily as needed for constipation 11/8/19   Reynaldo Campuzano MD   sulfamethoxazole-trimethoprim (BACTRIM DS) 800-160 MG tablet Take 1 tablet by mouth Every Mon, Tues two times daily 4/27/20   Félix Van APRN CNP   Vitamin D, Cholecalciferol, 25 MCG (1000 UT) TABS Take 2 tablets (2,000 Units) by mouth daily 12/12/19   Nicho Fischer MD      Reviewed medications with Lazaro, he has not missed any  "doses of chemotherapy.     Received influenza vaccine for the 9480-9096 season.       Physical Exam:   BP Readings from Last 1 Encounters:   08/06/20 (!) 104/38      Pulse Readings from Last 1 Encounters:   08/06/20 72      Resp Readings from Last 1 Encounters:   08/06/20 21      Temp Readings from Last 1 Encounters:   08/06/20 98  F (36.7  C) (Oral)      SpO2 Readings from Last 1 Encounters:   08/06/20 95%      Wt Readings from Last 1 Encounters:   08/06/20 81.7 kg (180 lb 1.9 oz)      Ht Readings from Last 1 Encounters:   08/06/20 1.844 m (6' 0.6\")     General: Lazaro is alert, interactive and appropriate.  HEENT: Skull is atrauamatic and normocephalic.  Full head of hair. PERRLA, sclera are non icteric and not injected, EOM are intact. Nares are patent without drainage. Oropharynx clear, no plaques noted. Buccal mucosa and tongue clear. MMM.  Neck:  Supple without lymphadenopathy.   Lymph: There is no cervical, supraclavicular, axillary, lymphadenopathy palpated.  Cardiovascular:  HR is regular, S1, S2 no murmur.  Capillary refill is < 2 seconds.   Respiratory: No cough noted. Respirations are easy.  Lungs are clear to auscultation throughout.  No crackles or wheezes.  Gastrointestinal: BS present in all quadrants.  Abdomen is soft and flat.  No pain with palpation. No hepatosplenomegaly or masses are palpated.  Skin: Few scattered acne lesions on his lower back.  No surrounding erythema, no drainage. No other skin lesions noted.  Neurological:  CN 2-12 grossly intact. Gait is normal.  No issues with balance. Sensation intact in hands and feet.     Musculoskeletal:  Good strength and ROM in all extremities.  Strong dorsiflexion at ankles and great toes (5/5) bilaterally without any pain at the Achilles.     Labs:   Results for orders placed or performed during the hospital encounter of 08/06/20 (from the past 24 hour(s))   CBC with platelets differential   Result Value Ref Range    WBC 3.6 (L) 4.0 - 11.0 10e9/L "    RBC Count 3.86 (L) 4.4 - 5.9 10e12/L    Hemoglobin 13.3 13.3 - 17.7 g/dL    Hematocrit 38.7 (L) 40.0 - 53.0 %     78 - 100 fl    MCH 34.5 (H) 26.5 - 33.0 pg    MCHC 34.4 31.5 - 36.5 g/dL    RDW 13.3 10.0 - 15.0 %    Platelet Count 198 150 - 450 10e9/L    Diff Method Automated Method     % Neutrophils 80.6 %    % Lymphocytes 11.5 %    % Monocytes 6.3 %    % Eosinophils 0.8 %    % Basophils 0.5 %    % Immature Granulocytes 0.3 %    Nucleated RBCs 0 0 /100    Absolute Neutrophil 2.9 1.6 - 8.3 10e9/L    Absolute Lymphocytes 0.4 (L) 0.8 - 5.3 10e9/L    Absolute Monocytes 0.2 0.0 - 1.3 10e9/L    Absolute Eosinophils 0.0 0.0 - 0.7 10e9/L    Absolute Basophils 0.0 0.0 - 0.2 10e9/L    Abs Immature Granulocytes 0.0 0 - 0.4 10e9/L    Absolute Nucleated RBC 0.0    Comprehensive metabolic panel   Result Value Ref Range    Sodium 141 133 - 144 mmol/L    Potassium 3.7 3.4 - 5.3 mmol/L    Chloride 109 94 - 109 mmol/L    Carbon Dioxide 28 20 - 32 mmol/L    Anion Gap 4 3 - 14 mmol/L    Glucose 97 70 - 99 mg/dL    Urea Nitrogen 13 7 - 30 mg/dL    Creatinine 0.68 0.66 - 1.25 mg/dL    GFR Estimate >90 >60 mL/min/[1.73_m2]    GFR Estimate If Black >90 >60 mL/min/[1.73_m2]    Calcium 8.6 8.5 - 10.1 mg/dL    Bilirubin Total 0.8 0.2 - 1.3 mg/dL    Albumin 4.0 3.4 - 5.0 g/dL    Protein Total 6.5 (L) 6.8 - 8.8 g/dL    Alkaline Phosphatase 63 40 - 150 U/L    ALT 24 0 - 70 U/L    AST 10 0 - 45 U/L   Cell count with differential CSF:   Result Value Ref Range    WBC CSF 0 0 - 5 /uL    RBC CSF 0 0 - 2 /uL    Tube Number 1 #    Color CSF Colorless CLRL^Colorless    Appearance CSF Clear CLER^Clear     Procedures:   DESCRIPTION OF PROCEDURE: Informed consent for the procedure was obtained prior to starting. A time out was performed to identify the patient and the procedure. The patient was sedated by anesthesia. The patient then was placed in the lateral decubitus position. The skin overlying the lower spinal column was prepped and draped  in a sterile fashion.  A 3.5 inch 22 gauge pencil tip (Stockpulse) spinal needle was inserted into the L3-L4 spinal interspace and clear CSF was obtained. Six ml was removed and then intrathecal chemotherapy was then administered 15 mg of methotrexate.  The spinal needle was removed.  The patient tolerated the procedure well. CSF was sent for cell count and cytopathologic evaluation.  I was present for the procedure.  Todd Mercado MD/PhD    Assessment:  Lazaro Lund is a 20 year old young man with T cell lymphoblastic lymphoma (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  He is being treated per [facility] protocol [id number]. He's had a CRu following Induction with a CR at the end of Consolidation. His course has been complicated by extensive bilateral DVT and severe necrotizing pancreatitis. He completed treatment with lovenox. Recent abdominal CT shows continued improvement in regards to the sequelae from his pancreatitis.  Asparaginase was permanently discontinued from his treatment regimen. He is on Vit D for deficiency.      He comes to clinic today for Day 29 Cycle 2 of Maintenance therapy. Overall, he has been doing well with improved nausea. He reported his weight loss as intentional and his current weight still above his pre-illness baseline.    He has been on reduced dose 6-MP (25% of full dose) for ~7 weeks because he is on allopurinol for correction of skewed 6-MP metabolism, manifested with hepatotoxicity. As his ANC remains high today, we will increase his 6-MP dose slightly (~33% of the full dose).    Plan:   1) Labs reviewed with Lazaro.  2) LP/IT methotrexate. Vincristine in clinic. Refills provided of methotrexate, 6-MP, and steroids.   3) Increase 6-MP dose to 50 mg daily (~33% of the full dose). Reviewed dose change with Lazaro.   4) Continue protonix.  5) Continue to monitor for new symptoms of thrombosis while off of lovenox. Low threshold to repeat U/S if any signs of thrombus.   6) Continue Vit D 3 2000 IU daily (last  level was 19 on 3/5/20).  Recheck level sent today.  7) Nausea much improved but will continue to monitor and he will call if worsens again.  8) Bactrim for PCP prophylaxis.  9) Continue to check CMP and TGNs monthly moving forward.  10) Didn't need ativan prior to clinic today for anticipatory vomiting, but it remains an option.  11) RTC in 4 weeks for D57 of Cycle 2.    Patient was seen and the plans were discussed with Dr. Todd Ascencio MD  Fellow, Pediatric Hematology/Oncology    I saw and evaluated the patient with the fellow. I discussed the patient with the fellow and agree with the findings and plan as documented in the note. I personally spent a total of 45 minutes in the clinic in direct care of this patient. Total time included discussion with patient/family, physical examination, reviewing data such as laboratory and radiographic studies, and discussion with the fellow. Greater than 50% of the total time was spent counseling and/or coordinating the care of the patient. Details can be found in the fellow note.    Todd Mercado M.D./Ph.D  Pediatric Hematology/Oncology

## 2020-08-06 NOTE — PROGRESS NOTES
Infusion Nursing Note    Lazaro Lund Presents to [facility] today for: Vincristine after LP    Due to : T lymphoblastic lymphoma (H)    Intravenous Access/Labs: Port accessed prior to arrival to clinic in Peds sedation.     Infusion Note: Pt. Seen and assessed by Dr. Mercado in Peds Sedation.  Pt. Denies any nausea.  Vincristine given to gravity. Blood return noted pre/mid/post infusion. Port heparin locked and deaccessed.  VSS.    Discharge Plan:   Patient verbalized understanding of discharge instructions.  Pt left [facility] in stable condition at conclusion of appt.

## 2020-08-07 LAB
APPEARANCE CSF: CLEAR
COLOR CSF: COLORLESS
DEPRECATED CALCIDIOL+CALCIFEROL SERPL-MC: 32 UG/L (ref 20–75)
RBC # CSF MANUAL: 0 /UL (ref 0–2)
TUBE # CSF: 1 #
WBC # CSF MANUAL: 0 /UL (ref 0–5)

## 2020-09-02 ENCOUNTER — TELEPHONE (OUTPATIENT)
Dept: PEDIATRIC HEMATOLOGY/ONCOLOGY | Facility: CLINIC | Age: 20
End: 2020-09-02

## 2020-09-02 NOTE — TELEPHONE ENCOUNTER
I tried calling the patient about completing their wellness screening for their future appointment. There was no answer, so I left a message for them to call us back at the  of the [facility]. [phone]    Xenia Joe CMA  September 2, 2020

## 2020-09-03 ENCOUNTER — INFUSION THERAPY VISIT (OUTPATIENT)
Dept: INFUSION THERAPY | Facility: CLINIC | Age: 20
End: 2020-09-03
Attending: PEDIATRICS
Payer: COMMERCIAL

## 2020-09-03 ENCOUNTER — OFFICE VISIT (OUTPATIENT)
Dept: PEDIATRIC HEMATOLOGY/ONCOLOGY | Facility: CLINIC | Age: 20
End: 2020-09-03
Attending: PEDIATRICS
Payer: COMMERCIAL

## 2020-09-03 VITALS
SYSTOLIC BLOOD PRESSURE: 129 MMHG | BODY MASS INDEX: 24.4 KG/M2 | HEART RATE: 79 BPM | HEIGHT: 72 IN | DIASTOLIC BLOOD PRESSURE: 57 MMHG | OXYGEN SATURATION: 99 % | RESPIRATION RATE: 16 BRPM | WEIGHT: 180.12 LBS | TEMPERATURE: 98.5 F

## 2020-09-03 DIAGNOSIS — C83.50 T LYMPHOBLASTIC LYMPHOMA (H): Primary | ICD-10-CM

## 2020-09-03 LAB
ALBUMIN SERPL-MCNC: 3.9 G/DL (ref 3.4–5)
ALP SERPL-CCNC: 65 U/L (ref 40–150)
ALT SERPL W P-5'-P-CCNC: 21 U/L (ref 0–70)
ANION GAP SERPL CALCULATED.3IONS-SCNC: 6 MMOL/L (ref 3–14)
AST SERPL W P-5'-P-CCNC: 7 U/L (ref 0–45)
BASOPHILS # BLD AUTO: 0 10E9/L (ref 0–0.2)
BASOPHILS NFR BLD AUTO: 1 %
BILIRUB SERPL-MCNC: 0.6 MG/DL (ref 0.2–1.3)
BUN SERPL-MCNC: 15 MG/DL (ref 7–30)
CALCIUM SERPL-MCNC: 8.5 MG/DL (ref 8.5–10.1)
CHLORIDE SERPL-SCNC: 106 MMOL/L (ref 94–109)
CO2 SERPL-SCNC: 29 MMOL/L (ref 20–32)
CREAT SERPL-MCNC: 0.63 MG/DL (ref 0.66–1.25)
DIFFERENTIAL METHOD BLD: ABNORMAL
EOSINOPHIL # BLD AUTO: 0.1 10E9/L (ref 0–0.7)
EOSINOPHIL NFR BLD AUTO: 5.1 %
ERYTHROCYTE [DISTWIDTH] IN BLOOD BY AUTOMATED COUNT: 13.6 % (ref 10–15)
GFR SERPL CREATININE-BSD FRML MDRD: >90 ML/MIN/{1.73_M2}
GLUCOSE SERPL-MCNC: 142 MG/DL (ref 70–99)
HCT VFR BLD AUTO: 35.9 % (ref 40–53)
HGB BLD-MCNC: 12.2 G/DL (ref 13.3–17.7)
IMM GRANULOCYTES # BLD: 0 10E9/L (ref 0–0.4)
IMM GRANULOCYTES NFR BLD: 0 %
LYMPHOCYTES # BLD AUTO: 0.5 10E9/L (ref 0.8–5.3)
LYMPHOCYTES NFR BLD AUTO: 24 %
MCH RBC QN AUTO: 34.4 PG (ref 26.5–33)
MCHC RBC AUTO-ENTMCNC: 34 G/DL (ref 31.5–36.5)
MCV RBC AUTO: 101 FL (ref 78–100)
MISCELLANEOUS TEST: NORMAL
MONOCYTES # BLD AUTO: 0.1 10E9/L (ref 0–1.3)
MONOCYTES NFR BLD AUTO: 7.1 %
NEUTROPHILS # BLD AUTO: 1.2 10E9/L (ref 1.6–8.3)
NEUTROPHILS NFR BLD AUTO: 62.8 %
NRBC # BLD AUTO: 0 10*3/UL
NRBC BLD AUTO-RTO: 0 /100
PLATELET # BLD AUTO: 231 10E9/L (ref 150–450)
POTASSIUM SERPL-SCNC: 3.5 MMOL/L (ref 3.4–5.3)
PROT SERPL-MCNC: 6.3 G/DL (ref 6.8–8.8)
RBC # BLD AUTO: 3.55 10E12/L (ref 4.4–5.9)
SODIUM SERPL-SCNC: 141 MMOL/L (ref 133–144)
WBC # BLD AUTO: 2 10E9/L (ref 4–11)

## 2020-09-03 PROCEDURE — 80299 QUANTITATIVE ASSAY DRUG: CPT | Performed by: NURSE PRACTITIONER

## 2020-09-03 PROCEDURE — 96409 CHEMO IV PUSH SNGL DRUG: CPT

## 2020-09-03 PROCEDURE — 85025 COMPLETE CBC W/AUTO DIFF WBC: CPT | Performed by: NURSE PRACTITIONER

## 2020-09-03 PROCEDURE — 84999 UNLISTED CHEMISTRY PROCEDURE: CPT | Performed by: NURSE PRACTITIONER

## 2020-09-03 PROCEDURE — 80053 COMPREHEN METABOLIC PANEL: CPT | Performed by: NURSE PRACTITIONER

## 2020-09-03 PROCEDURE — 25800030 ZZH RX IP 258 OP 636: Mod: ZF | Performed by: NURSE PRACTITIONER

## 2020-09-03 PROCEDURE — 25000128 H RX IP 250 OP 636: Mod: ZF

## 2020-09-03 PROCEDURE — 25000128 H RX IP 250 OP 636: Mod: ZF | Performed by: NURSE PRACTITIONER

## 2020-09-03 RX ORDER — HEPARIN SODIUM (PORCINE) LOCK FLUSH IV SOLN 100 UNIT/ML 100 UNIT/ML
SOLUTION INTRAVENOUS
Status: COMPLETED
Start: 2020-09-03 | End: 2020-09-03

## 2020-09-03 RX ORDER — HEPARIN SODIUM (PORCINE) LOCK FLUSH IV SOLN 100 UNIT/ML 100 UNIT/ML
5 SOLUTION INTRAVENOUS
Status: DISCONTINUED | OUTPATIENT
Start: 2020-09-03 | End: 2020-09-03 | Stop reason: HOSPADM

## 2020-09-03 RX ADMIN — HEPARIN SODIUM (PORCINE) LOCK FLUSH IV SOLN 100 UNIT/ML 500 UNITS: 100 SOLUTION at 14:20

## 2020-09-03 RX ADMIN — VINCRISTINE SULFATE 2 MG: 1 INJECTION, SOLUTION INTRAVENOUS at 14:15

## 2020-09-03 RX ADMIN — HEPARIN 500 UNITS: 100 SYRINGE at 14:20

## 2020-09-03 ASSESSMENT — MIFFLIN-ST. JEOR: SCORE: 1860.75

## 2020-09-03 ASSESSMENT — PAIN SCALES - GENERAL: PAINLEVEL: NO PAIN (0)

## 2020-09-03 NOTE — PROGRESS NOTES
Pediatric Hematology/Oncology Clinic Note     Laazro Lund is a 20 year old young man with T-cell lymphoblastic lymphoma. Lazaro presented with acute onset cough and was found to have an anterior mediastinal mass and malignant left-sided pleural effusion.  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  He was admitted to [facility] oncology service 8/30 and started on treatment per [id number].  He had a pleural effusion that required a chest tube and a pericardial effusion that was not drained. His Induction was complicated by cardiac compression secondary to his mass leading to tamponade, this improved through out his hospital stay.  He also had dysphagia that improved with treatment of his mass, swallow study was normal. His course was recently complicated by extensive bilateral UE DVTs, and he was successfully treated with anticoagulation. His consolidation course was complicated by the development of severe asparaginase induced pancreatitis. He became quite ill with SIRS and associated hypotension, he required pressor support in the ICU. As a result, asparaginase was eliminated from his treatment plan. He was in CR status at end of consolidation. During maintenance, he developed hepatotoxicity so allopurinol and dose reduced 6MP were started for correction of skewed 6-MP metabolism.     HPI:   At his last visit, Lazaro's 6-MP was increased from 25% full dose to 33% full dose. There have been logistical issues with measuring his purine metabolites, so a send out lab was arranged today. Since his last visit, Lazaro states that he is doing well and feels like a normal person. He tolerates his chemotherapy and states that he has only missed one dose. He is going to the gym and also working as a  now. He denies headache, chest pain, SOB, abdominal pain, constipation, weakness, balance issues, rash. He does have a nodule on his thigh which he  attributes to an ingrown hair. He states that he is not smoking cigarettes (quit a while ago) but continues to smoke marijuana daily.     Review of systems:  The 10 point Review of Systems is negative other than noted in the HPI or here.      PMH:   Past Medical History:   Diagnosis Date     Acute necrotizing pancreatitis 11/07/2019    attributed to asparaginase     Acute pancreatitis due to PEGaspariginase therapy  11/15/2019     DVT of upper extremity (deep vein thrombosis) (H) 09/26/2019    Bilateral      Edema of upper extremity 10/17/2019     Folliculitis 10/17/2019     Migraine 2006    have resolved     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     -- Spinal HA following LP  -- TPMT shows Intermediate activity with normal TPMT/NUDT15 genotype  -- Factor V leiden and prothrombin gene mutation negative  -- Necrotizing pancreatitis secondary to asparaginase  -- former smoke, quit with the use of nicotine patches. Does smoke marijuana daily, working on decreasing frequency     PFMH:   Family History   Problem Relation Age of Onset     No Known Problems Mother      No Known Problems Father      Asthma Brother      Thyroid Cancer Paternal Grandmother      Melanoma Paternal Aunt        Social History:   Social History     Social History Narrative    Lazaro previously lived at home with his dad and step mom in [place] but is now staying with his grandma in [place]. Biological mother is involved in his life. Has 4 step-siblings and 1 biological sibling. 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 Has completed high school. 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  He has been enjoying fishing and video games.        Current Medications:  Current Outpatient Medications on File Prior to Visit   Medication Sig Dispense Refill     allopurinol (ZYLOPRIM) 100 MG tablet Take 1 tablet (100 mg) by  mouth daily 30 tablet 11     diphenhydrAMINE (BENADRYL) 25 MG capsule Take 1-2 capsules (25-50 mg) by mouth every 6 hours as needed (Breakthrough Nausea and Vomiting ) 30 capsule 1     lidocaine-prilocaine (EMLA) 2.5-2.5 % external cream Apply topically as needed for other (prior to port access) 30 g 6     LORazepam (ATIVAN) 1 MG tablet Take 1 tablet prior to clinic visits for anticipatory vomiting. 10 tablet 0     mercaptopurine (PURINETHOL) 50 MG tablet Take 1 tablet (50 mg) by mouth daily Take 1 tablet (50mg) daily 26 tablet 2     methotrexate 2.5 MG tablet Take 17 tablets (42.5 mg) by mouth once a week Do not take on Days of LP. 68 tablet 2     methotrexate 2.5 MG tablet Take 17 tablets (42.5 mg) by mouth once a week Do not take on Days of LP. 68 tablet 2     ondansetron (ZOFRAN-ODT) 8 MG ODT tab Take 1 tablet (8 mg) by mouth every 8 hours as needed for nausea 20 tablet 6     pantoprazole (PROTONIX) 40 MG EC tablet Take 1 tablet (40 mg) by mouth every morning (before breakfast) 30 tablet 2     polyethylene glycol (MIRALAX/GLYCOLAX) packet Take 17 g by mouth daily 30 packet 0     predniSONE (DELTASONE) 10 MG tablet Take 45mg (4.5 tabs) in the AM and 40mg (4 tabs) in the PM for 5 days every 4 weeks. 43 tablet 2     predniSONE (DELTASONE) 10 MG tablet Take 45mg (4.5 tabs) in the AM and 40mg (4 tabs) in the PM for 5 days 43 tablet 2     scopolamine (TRANSDERM) 1 MG/3DAYS 72 hr patch Place 1 patch onto the skin every 72 hours 10 patch 3     scopolamine (TRANSDERM) 1 MG/3DAYS 72 hr patch Place 1 patch onto the skin every 72 hours 10 patch 3     sennosides (SENOKOT) 8.6 MG tablet Take 2 tablets by mouth 2 times daily as needed for constipation 60 each 1     sulfamethoxazole-trimethoprim (BACTRIM DS) 800-160 MG tablet Take 1 tablet by mouth Every Mon, Tues two times daily 16 tablet 11     Vitamin D, Cholecalciferol, 25 MCG (1000 UT) TABS Take 2 tablets (2,000 Units) by mouth daily 60 tablet 3     Reviewed medications  with Lazaro  Will receive [id number] influenza vaccine when available     Physical Exam:   Temp:  [98.5  F (36.9  C)] 98.5  F (36.9  C)  Pulse:  [79] 79  Resp:  [16] 16  BP: (129)/(57) 129/57  SpO2:  [99 %] 99 %     Wt Readings from Last 5 Encounters:   09/03/20 81.7 kg (180 lb 1.9 oz)   08/06/20 81.7 kg (180 lb 1.9 oz)   08/06/20 81.7 kg (180 lb 1.9 oz)   07/09/20 84.1 kg (185 lb 6.5 oz)   06/09/20 85.1 kg (187 lb 9.8 oz)     General: Lazaro is alert, interactive and appropriate.  HEENT: Skull is atrauamatic and normocephalic.  Full head of hair. PERRLA, sclera are non icteric and not injected, EOM are intact. Nares are patent without drainage. Oropharynx clear, no plaques noted. Buccal mucosa and tongue clear. MMM.  Neck:  Supple without lymphadenopathy.   Lymph: There is no cervical, supraclavicular, axillary, lymphadenopathy palpated.  Cardiovascular:  HR is regular, S1, S2 no murmur.  Capillary refill is < 2 seconds.   Respiratory: No cough noted. Respirations are easy.  Lungs are clear to auscultation throughout.  No crackles or wheezes.  Gastrointestinal: BS present in all quadrants.  Abdomen is soft and flat.  No pain with palpation. No hepatosplenomegaly or masses are palpated.  Skin: Indurated nodule on thigh with inflammed hair follicle   Neurological:  CN 2-12 grossly intact. Gait is normal.  No issues with balance. Sensation intact in hands and feet.     Musculoskeletal:  Good strength and ROM in all extremities.  Strong dorsiflexion at ankles and great toes (5/5) bilaterally without any pain at the Achilles.     Labs:   Results for orders placed or performed in visit on 09/03/20 (from the past 24 hour(s))   CBC with platelets differential   Result Value Ref Range    WBC 2.0 (L) 4.0 - 11.0 10e9/L    RBC Count 3.55 (L) 4.4 - 5.9 10e12/L    Hemoglobin 12.2 (L) 13.3 - 17.7 g/dL    Hematocrit 35.9 (L) 40.0 - 53.0 %     (H) 78 - 100 fl    MCH 34.4 (H) 26.5 - 33.0 pg    MCHC 34.0 31.5 - 36.5 g/dL    RDW  13.6 10.0 - 15.0 %    Platelet Count 231 150 - 450 10e9/L    Diff Method Automated Method     % Neutrophils 62.8 %    % Lymphocytes 24.0 %    % Monocytes 7.1 %    % Eosinophils 5.1 %    % Basophils 1.0 %    % Immature Granulocytes 0.0 %    Nucleated RBCs 0 0 /100    Absolute Neutrophil 1.2 (L) 1.6 - 8.3 10e9/L    Absolute Lymphocytes 0.5 (L) 0.8 - 5.3 10e9/L    Absolute Monocytes 0.1 0.0 - 1.3 10e9/L    Absolute Eosinophils 0.1 0.0 - 0.7 10e9/L    Absolute Basophils 0.0 0.0 - 0.2 10e9/L    Abs Immature Granulocytes 0.0 0 - 0.4 10e9/L    Absolute Nucleated RBC 0.0         Assessment:  Lazaro Lund is a 20 year old young man with T cell lymphoblastic lymphoma (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  He is being treated per [facility] protocol [id number]. He's had a CRu following Induction with a CR at the end of Consolidation. His course was complicated by extensive bilateral DVT and severe necrotizing pancreatitis.       He comes to clinic today for Day 57 Cycle 2 of Maintenance therapy. Overall, he has been doing well. He is currently on 33% full dose of 6MP with allopurinol for correction of skewed 6-MP metabolism, manifested with hepatotoxicity and significant GI upset. As his ANC today is 1.2, we will continue his current dose.    Plan:   1) Labs reviewed with Lazaro.   2) Vincristine in clinic. Refills provided of 6-MP, methotrexate, and prednisone.   3) Continue 6-MP at 50 mg daily (~33% of the full dose) and methotrexate 42.5 mg weekly. Reviewed doses with Lazaro. Following metabolites to assist with dosing.  4) Continue protonix.  5) Continue Vit D 3 2000 IU daily: vit D sufficient at 32 on 8/6/20  6) Bactrim for PCP prophylaxis.  7) Continue to check CMP and TGNs monthly moving forward.  8) Apply bactroban to ingrown hair follicle on thigh until resolution. Advised to call if site becomes more painful, red, or systemic symptoms occur.  9) RTC in 4 weeks for D1 of Cycle 3 with LP. Discussed moving his clinic day from Thursday to  Tuesday after his next appt.       Signed,  Nicho Fischer   Pediatric Hematology/Oncology Fellow    Physician Attestation    I saw and evaluated the patient. I discussed with the fellow and agree with the findings and plan as documented in the fellow's note.     Reynaldo Campuzano MD  Pediatric Hematology/Oncology  [facility]

## 2020-09-03 NOTE — PROGRESS NOTES
Infusion Nursing Note    Lazaro Lund Presents to [facility] today for: Vincristine    Due to : T lymphoblastic lymphoma (H)    Intravenous Access/Labs: Port accessed using sterile technique by Laureen Cruz RN. Labs drawn from port.     Coping:   Child Family Life declined    Infusion Note: Vincristine given via gravity without issues. Pre/mid/post blood return noted. After infusion, port heparin locked and de-accessed. Refill Rx's delivered.     Discharge Plan:   Patient verbalized understanding of discharge instructions.  RN reviewed that pt should return to clinic on 10/1/20.  Pt left [facility] in stable condition.

## 2020-09-03 NOTE — LETTER
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 Lazaro presented with acute onset cough and was found to have an anterior mediastinal mass and malignant left-sided pleural effusion.  A CT guided biopsy was obtained at [facility] and pathology was consistent with T-cell leukemia vs lymphoma.  He was admitted to [facility] oncology service 8/30 and started on treatment per [id number].  He had a pleural effusion that required a chest tube and a pericardial effusion that was not drained. His Induction was complicated by cardiac compression secondary to his mass leading to tamponade, this improved through out his hospital stay.  He also had dysphagia that improved with treatment of his mass, swallow study was normal. His course was recently complicated by extensive bilateral UE DVTs, and he was successfully treated with anticoagulation. His consolidation course was complicated by the development of severe asparaginase induced pancreatitis. He became quite ill with SIRS and associated hypotension, he required pressor support in the ICU. As a result, asparaginase was eliminated from his treatment plan. He was in CR status at end of consolidation. During maintenance, he developed hepatotoxicity so allopurinol and dose reduced 6MP were started for correction of skewed 6-MP metabolism.     HPI:   At his last visit, Lazaro's 6-MP was increased from 25% full dose to 33% full dose. There have been logistical issues with measuring his purine metabolites, so a send out lab was arranged today. Since his last visit, Lazaro states that he is doing well and feels like a normal person. He tolerates his chemotherapy and states that he has only missed one dose. He is going to the gym and also working as a  now. He denies headache, chest pain, SOB, abdominal pain,  constipation, weakness, balance issues, rash. He does have a nodule on his thigh which he attributes to an ingrown hair. He states that he is not smoking cigarettes (quit a while ago) but continues to smoke marijuana daily.     Review of systems:  The 10 point Review of Systems is negative other than noted in the HPI or here.      PMH:   Past Medical History:   Diagnosis Date     Acute necrotizing pancreatitis 11/07/2019    attributed to asparaginase     Acute pancreatitis due to PEGaspariginase therapy  11/15/2019     DVT of upper extremity (deep vein thrombosis) (H) 09/26/2019    Bilateral      Edema of upper extremity 10/17/2019     Folliculitis 10/17/2019     Migraine 2006    have resolved     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     -- Spinal HA following LP  -- TPMT shows Intermediate activity with normal TPMT/NUDT15 genotype  -- Factor V leiden and prothrombin gene mutation negative  -- Necrotizing pancreatitis secondary to asparaginase  -- former smoke, quit with the use of nicotine patches. Does smoke marijuana daily, working on decreasing frequency     PFMH:   Family History   Problem Relation Age of Onset     No Known Problems Mother      No Known Problems Father      Asthma Brother      Thyroid Cancer Paternal Grandmother      Melanoma Paternal Aunt        Social History:   Social History     Social History Narrative    Lazaro previously lived at home with his dad and step mom in [place] but is now staying with his grandma in [place]. Biological mother is involved in his life. Has 4 step-siblings and 1 biological sibling. 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 Has completed high school. 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  He has been enjoying fishing and video games.        Current Medications:  Current Outpatient Medications on File Prior to Visit    Medication Sig Dispense Refill     allopurinol (ZYLOPRIM) 100 MG tablet Take 1 tablet (100 mg) by mouth daily 30 tablet 11     diphenhydrAMINE (BENADRYL) 25 MG capsule Take 1-2 capsules (25-50 mg) by mouth every 6 hours as needed (Breakthrough Nausea and Vomiting ) 30 capsule 1     lidocaine-prilocaine (EMLA) 2.5-2.5 % external cream Apply topically as needed for other (prior to port access) 30 g 6     LORazepam (ATIVAN) 1 MG tablet Take 1 tablet prior to clinic visits for anticipatory vomiting. 10 tablet 0     mercaptopurine (PURINETHOL) 50 MG tablet Take 1 tablet (50 mg) by mouth daily Take 1 tablet (50mg) daily 26 tablet 2     methotrexate 2.5 MG tablet Take 17 tablets (42.5 mg) by mouth once a week Do not take on Days of LP. 68 tablet 2     methotrexate 2.5 MG tablet Take 17 tablets (42.5 mg) by mouth once a week Do not take on Days of LP. 68 tablet 2     ondansetron (ZOFRAN-ODT) 8 MG ODT tab Take 1 tablet (8 mg) by mouth every 8 hours as needed for nausea 20 tablet 6     pantoprazole (PROTONIX) 40 MG EC tablet Take 1 tablet (40 mg) by mouth every morning (before breakfast) 30 tablet 2     polyethylene glycol (MIRALAX/GLYCOLAX) packet Take 17 g by mouth daily 30 packet 0     predniSONE (DELTASONE) 10 MG tablet Take 45mg (4.5 tabs) in the AM and 40mg (4 tabs) in the PM for 5 days every 4 weeks. 43 tablet 2     predniSONE (DELTASONE) 10 MG tablet Take 45mg (4.5 tabs) in the AM and 40mg (4 tabs) in the PM for 5 days 43 tablet 2     scopolamine (TRANSDERM) 1 MG/3DAYS 72 hr patch Place 1 patch onto the skin every 72 hours 10 patch 3     scopolamine (TRANSDERM) 1 MG/3DAYS 72 hr patch Place 1 patch onto the skin every 72 hours 10 patch 3     sennosides (SENOKOT) 8.6 MG tablet Take 2 tablets by mouth 2 times daily as needed for constipation 60 each 1     sulfamethoxazole-trimethoprim (BACTRIM DS) 800-160 MG tablet Take 1 tablet by mouth Every Mon, Tues two times daily 16 tablet 11     Vitamin D, Cholecalciferol, 25  MCG (1000 UT) TABS Take 2 tablets (2,000 Units) by mouth daily 60 tablet 3     Reviewed medications with Lazaro  Will receive [id number] influenza vaccine when available     Physical Exam:   Temp:  [98.5  F (36.9  C)] 98.5  F (36.9  C)  Pulse:  [79] 79  Resp:  [16] 16  BP: (129)/(57) 129/57  SpO2:  [99 %] 99 %     Wt Readings from Last 5 Encounters:   09/03/20 81.7 kg (180 lb 1.9 oz)   08/06/20 81.7 kg (180 lb 1.9 oz)   08/06/20 81.7 kg (180 lb 1.9 oz)   07/09/20 84.1 kg (185 lb 6.5 oz)   06/09/20 85.1 kg (187 lb 9.8 oz)     General: Lazaro is alert, interactive and appropriate.  HEENT: Skull is atrauamatic and normocephalic.  Full head of hair. PERRLA, sclera are non icteric and not injected, EOM are intact. Nares are patent without drainage. Oropharynx clear, no plaques noted. Buccal mucosa and tongue clear. MMM.  Neck:  Supple without lymphadenopathy.   Lymph: There is no cervical, supraclavicular, axillary, lymphadenopathy palpated.  Cardiovascular:  HR is regular, S1, S2 no murmur.  Capillary refill is < 2 seconds.   Respiratory: No cough noted. Respirations are easy.  Lungs are clear to auscultation throughout.  No crackles or wheezes.  Gastrointestinal: BS present in all quadrants.  Abdomen is soft and flat.  No pain with palpation. No hepatosplenomegaly or masses are palpated.  Skin: Indurated nodule on thigh with inflammed hair follicle   Neurological:  CN 2-12 grossly intact. Gait is normal.  No issues with balance. Sensation intact in hands and feet.     Musculoskeletal:  Good strength and ROM in all extremities.  Strong dorsiflexion at ankles and great toes (5/5) bilaterally without any pain at the Achilles.     Labs:   Results for orders placed or performed in visit on 09/03/20 (from the past 24 hour(s))   CBC with platelets differential   Result Value Ref Range    WBC 2.0 (L) 4.0 - 11.0 10e9/L    RBC Count 3.55 (L) 4.4 - 5.9 10e12/L    Hemoglobin 12.2 (L) 13.3 - 17.7 g/dL    Hematocrit 35.9 (L) 40.0 -  53.0 %     (H) 78 - 100 fl    MCH 34.4 (H) 26.5 - 33.0 pg    MCHC 34.0 31.5 - 36.5 g/dL    RDW 13.6 10.0 - 15.0 %    Platelet Count 231 150 - 450 10e9/L    Diff Method Automated Method     % Neutrophils 62.8 %    % Lymphocytes 24.0 %    % Monocytes 7.1 %    % Eosinophils 5.1 %    % Basophils 1.0 %    % Immature Granulocytes 0.0 %    Nucleated RBCs 0 0 /100    Absolute Neutrophil 1.2 (L) 1.6 - 8.3 10e9/L    Absolute Lymphocytes 0.5 (L) 0.8 - 5.3 10e9/L    Absolute Monocytes 0.1 0.0 - 1.3 10e9/L    Absolute Eosinophils 0.1 0.0 - 0.7 10e9/L    Absolute Basophils 0.0 0.0 - 0.2 10e9/L    Abs Immature Granulocytes 0.0 0 - 0.4 10e9/L    Absolute Nucleated RBC 0.0         Assessment:  Lazaro Lund is a 20 year old young man with T cell lymphoblastic lymphoma (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  He is being treated per COG protocol [id number]. He's had a CRu following Induction with a CR at the end of Consolidation. His course was complicated by extensive bilateral DVT and severe necrotizing pancreatitis.       He comes to clinic today for Day 57 Cycle 2 of Maintenance therapy. Overall, he has been doing well. He is currently on 33% full dose of 6MP with allopurinol for correction of skewed 6-MP metabolism, manifested with hepatotoxicity and significant GI upset. As his ANC today is 1.2, we will continue his current dose.    Plan:   1) Labs reviewed with Lazaro.   2) Vincristine in clinic. Refills provided of 6-MP, methotrexate, and prednisone.   3) Continue 6-MP at 50 mg daily (~33% of the full dose) and methotrexate 42.5 mg weekly. Reviewed doses with Lazaro. Following metabolites to assist with dosing.  4) Continue protonix.  5) Continue Vit D 3 2000 IU daily: vit D sufficient at 32 on 8/6/20  6) Bactrim for PCP prophylaxis.  7) Continue to check CMP and TGNs monthly moving forward.  8) Apply bactroban to ingrown hair follicle on thigh until resolution. Advised to call if site becomes more painful, red, or systemic symptoms  occur.  9) RTC in 4 weeks for D1 of Cycle 3 with LP. Discussed moving his clinic day from Thursday to Tuesday after his next appt.       Signed,  Nicho Fischer   Pediatric Hematology/Oncology Fellow    Physician Attestation    I saw and evaluated the patient. I discussed with the fellow and agree with the findings and plan as documented in the fellow's note.     Reynaldo Campuzano MD  Pediatric Hematology/Oncology  [facility]

## 2020-09-09 LAB — LAB SCANNED RESULT: ABNORMAL

## 2020-09-10 DIAGNOSIS — Z11.59 ENCOUNTER FOR SCREENING FOR OTHER VIRAL DISEASES: Primary | ICD-10-CM

## 2020-09-10 RX ORDER — DIPHENHYDRAMINE HYDROCHLORIDE 50 MG/ML
50 INJECTION INTRAMUSCULAR; INTRAVENOUS
Status: CANCELLED
Start: 2020-10-06

## 2020-09-10 RX ORDER — METHYLPREDNISOLONE SODIUM SUCCINATE 125 MG/2ML
125 INJECTION, POWDER, LYOPHILIZED, FOR SOLUTION INTRAMUSCULAR; INTRAVENOUS
Status: CANCELLED | OUTPATIENT
Start: 2020-11-03

## 2020-09-10 RX ORDER — ALBUTEROL SULFATE 90 UG/1
1-2 AEROSOL, METERED RESPIRATORY (INHALATION)
Status: CANCELLED
Start: 2020-10-06

## 2020-09-10 RX ORDER — ALBUTEROL SULFATE 0.83 MG/ML
2.5 SOLUTION RESPIRATORY (INHALATION)
Status: CANCELLED | OUTPATIENT
Start: 2020-10-06

## 2020-09-10 RX ORDER — DIPHENHYDRAMINE HYDROCHLORIDE 50 MG/ML
50 INJECTION INTRAMUSCULAR; INTRAVENOUS
Status: CANCELLED
Start: 2020-12-01

## 2020-09-10 RX ORDER — CAFFEINE AND SODIUM BENZOATE 125 MG/ML
500 INJECTION, SOLUTION INTRAMUSCULAR; INTRAVENOUS ONCE
Status: CANCELLED
Start: 2020-10-06

## 2020-09-10 RX ORDER — ALBUTEROL SULFATE 0.83 MG/ML
2.5 SOLUTION RESPIRATORY (INHALATION)
Status: CANCELLED | OUTPATIENT
Start: 2020-11-03

## 2020-09-10 RX ORDER — EPINEPHRINE 1 MG/ML
0.3 INJECTION, SOLUTION, CONCENTRATE INTRAVENOUS EVERY 5 MIN PRN
Status: CANCELLED | OUTPATIENT
Start: 2020-10-06

## 2020-09-10 RX ORDER — EPINEPHRINE 1 MG/ML
0.3 INJECTION, SOLUTION, CONCENTRATE INTRAVENOUS EVERY 5 MIN PRN
Status: CANCELLED | OUTPATIENT
Start: 2020-12-01

## 2020-09-10 RX ORDER — METHYLPREDNISOLONE SODIUM SUCCINATE 125 MG/2ML
125 INJECTION, POWDER, LYOPHILIZED, FOR SOLUTION INTRAMUSCULAR; INTRAVENOUS
Status: CANCELLED | OUTPATIENT
Start: 2020-10-06

## 2020-09-10 RX ORDER — SODIUM CHLORIDE 9 MG/ML
200 INJECTION, SOLUTION INTRAVENOUS CONTINUOUS PRN
Status: CANCELLED | OUTPATIENT
Start: 2020-10-06

## 2020-09-10 RX ORDER — SODIUM CHLORIDE 9 MG/ML
200 INJECTION, SOLUTION INTRAVENOUS CONTINUOUS PRN
Status: CANCELLED | OUTPATIENT
Start: 2020-12-01

## 2020-09-10 RX ORDER — ALBUTEROL SULFATE 0.83 MG/ML
2.5 SOLUTION RESPIRATORY (INHALATION)
Status: CANCELLED | OUTPATIENT
Start: 2020-12-01

## 2020-09-10 RX ORDER — METHYLPREDNISOLONE SODIUM SUCCINATE 125 MG/2ML
125 INJECTION, POWDER, LYOPHILIZED, FOR SOLUTION INTRAMUSCULAR; INTRAVENOUS
Status: CANCELLED | OUTPATIENT
Start: 2020-12-01

## 2020-09-10 RX ORDER — ALBUTEROL SULFATE 90 UG/1
1-2 AEROSOL, METERED RESPIRATORY (INHALATION)
Status: CANCELLED
Start: 2020-12-01

## 2020-09-10 RX ORDER — DIPHENHYDRAMINE HYDROCHLORIDE 50 MG/ML
50 INJECTION INTRAMUSCULAR; INTRAVENOUS
Status: CANCELLED
Start: 2020-11-03

## 2020-09-10 RX ORDER — EPINEPHRINE 1 MG/ML
0.3 INJECTION, SOLUTION, CONCENTRATE INTRAVENOUS EVERY 5 MIN PRN
Status: CANCELLED | OUTPATIENT
Start: 2020-11-03

## 2020-09-10 RX ORDER — ALBUTEROL SULFATE 90 UG/1
1-2 AEROSOL, METERED RESPIRATORY (INHALATION)
Status: CANCELLED
Start: 2020-11-03

## 2020-09-10 RX ORDER — SODIUM CHLORIDE 9 MG/ML
200 INJECTION, SOLUTION INTRAVENOUS CONTINUOUS PRN
Status: CANCELLED | OUTPATIENT
Start: 2020-11-03

## 2020-09-10 RX ORDER — CAFFEINE AND SODIUM BENZOATE 125 MG/ML
500 INJECTION, SOLUTION INTRAMUSCULAR; INTRAVENOUS ONCE
Status: CANCELLED
Start: 2020-11-03

## 2020-10-05 ENCOUNTER — ANESTHESIA EVENT (OUTPATIENT)
Dept: PEDIATRICS | Facility: CLINIC | Age: 20
End: 2020-10-05
Payer: COMMERCIAL

## 2020-10-06 ENCOUNTER — ANESTHESIA (OUTPATIENT)
Dept: PEDIATRICS | Facility: CLINIC | Age: 20
End: 2020-10-06
Payer: COMMERCIAL

## 2020-10-06 ENCOUNTER — OFFICE VISIT (OUTPATIENT)
Dept: PEDIATRIC HEMATOLOGY/ONCOLOGY | Facility: CLINIC | Age: 20
End: 2020-10-06
Attending: NURSE PRACTITIONER
Payer: COMMERCIAL

## 2020-10-06 ENCOUNTER — HOSPITAL ENCOUNTER (OUTPATIENT)
Dept: GENERAL RADIOLOGY | Facility: CLINIC | Age: 20
End: 2020-10-06
Attending: NURSE PRACTITIONER | Admitting: PEDIATRICS
Payer: COMMERCIAL

## 2020-10-06 ENCOUNTER — HOSPITAL ENCOUNTER (OUTPATIENT)
Facility: CLINIC | Age: 20
Discharge: HOME | End: 2020-10-06
Attending: PEDIATRICS | Admitting: PEDIATRICS
Payer: COMMERCIAL

## 2020-10-06 ENCOUNTER — INFUSION THERAPY VISIT (OUTPATIENT)
Dept: INFUSION THERAPY | Facility: CLINIC | Age: 20
End: 2020-10-06
Attending: NURSE PRACTITIONER
Payer: COMMERCIAL

## 2020-10-06 VITALS
HEART RATE: 79 BPM | TEMPERATURE: 98.3 F | RESPIRATION RATE: 16 BRPM | OXYGEN SATURATION: 98 % | WEIGHT: 176.59 LBS | HEIGHT: 73 IN | SYSTOLIC BLOOD PRESSURE: 123 MMHG | DIASTOLIC BLOOD PRESSURE: 69 MMHG | BODY MASS INDEX: 23.4 KG/M2

## 2020-10-06 VITALS
BODY MASS INDEX: 23.4 KG/M2 | SYSTOLIC BLOOD PRESSURE: 103 MMHG | WEIGHT: 176.59 LBS | DIASTOLIC BLOOD PRESSURE: 40 MMHG | RESPIRATION RATE: 18 BRPM | HEIGHT: 73 IN | TEMPERATURE: 98 F | OXYGEN SATURATION: 100 % | HEART RATE: 76 BPM

## 2020-10-06 DIAGNOSIS — C83.50 T LYMPHOBLASTIC LYMPHOMA (H): Primary | ICD-10-CM

## 2020-10-06 DIAGNOSIS — E55.9 VITAMIN D DEFICIENCY: ICD-10-CM

## 2020-10-06 DIAGNOSIS — Z11.59 ENCOUNTER FOR SCREENING FOR OTHER VIRAL DISEASES: ICD-10-CM

## 2020-10-06 LAB
ALBUMIN SERPL-MCNC: 4.3 G/DL (ref 3.4–5)
ALP SERPL-CCNC: 65 U/L (ref 40–150)
ALT SERPL W P-5'-P-CCNC: 19 U/L (ref 0–70)
ANION GAP SERPL CALCULATED.3IONS-SCNC: 4 MMOL/L (ref 3–14)
APPEARANCE CSF: CLEAR
AST SERPL W P-5'-P-CCNC: 9 U/L (ref 0–45)
BASOPHILS # BLD AUTO: 0 10E9/L (ref 0–0.2)
BASOPHILS NFR BLD AUTO: 0.4 %
BILIRUB SERPL-MCNC: 0.5 MG/DL (ref 0.2–1.3)
BUN SERPL-MCNC: 17 MG/DL (ref 7–30)
CALCIUM SERPL-MCNC: 8.5 MG/DL (ref 8.5–10.1)
CHLORIDE SERPL-SCNC: 108 MMOL/L (ref 94–109)
CO2 SERPL-SCNC: 29 MMOL/L (ref 20–32)
COLOR CSF: COLORLESS
CREAT SERPL-MCNC: 0.82 MG/DL (ref 0.66–1.25)
DIFFERENTIAL METHOD BLD: ABNORMAL
EOSINOPHIL # BLD AUTO: 0.1 10E9/L (ref 0–0.7)
EOSINOPHIL NFR BLD AUTO: 3.4 %
ERYTHROCYTE [DISTWIDTH] IN BLOOD BY AUTOMATED COUNT: 14.8 % (ref 10–15)
GFR SERPL CREATININE-BSD FRML MDRD: >90 ML/MIN/{1.73_M2}
GLUCOSE SERPL-MCNC: 93 MG/DL (ref 70–99)
HCT VFR BLD AUTO: 36 % (ref 40–53)
HGB BLD-MCNC: 12.2 G/DL (ref 13.3–17.7)
IMM GRANULOCYTES # BLD: 0 10E9/L (ref 0–0.4)
IMM GRANULOCYTES NFR BLD: 0.4 %
LABORATORY COMMENT REPORT: NORMAL
LYMPHOCYTES # BLD AUTO: 0.5 10E9/L (ref 0.8–5.3)
LYMPHOCYTES NFR BLD AUTO: 20 %
MCH RBC QN AUTO: 34.9 PG (ref 26.5–33)
MCHC RBC AUTO-ENTMCNC: 33.9 G/DL (ref 31.5–36.5)
MCV RBC AUTO: 103 FL (ref 78–100)
MISCELLANEOUS TEST: NORMAL
MONOCYTES # BLD AUTO: 0.2 10E9/L (ref 0–1.3)
MONOCYTES NFR BLD AUTO: 7.2 %
NEUTROPHILS # BLD AUTO: 1.6 10E9/L (ref 1.6–8.3)
NEUTROPHILS NFR BLD AUTO: 68.6 %
NRBC # BLD AUTO: 0 10*3/UL
NRBC BLD AUTO-RTO: 0 /100
PLATELET # BLD AUTO: 208 10E9/L (ref 150–450)
POTASSIUM SERPL-SCNC: 3.9 MMOL/L (ref 3.4–5.3)
PROT SERPL-MCNC: 6.5 G/DL (ref 6.8–8.8)
RBC # BLD AUTO: 3.5 10E12/L (ref 4.4–5.9)
RBC # CSF MANUAL: 0 /UL (ref 0–2)
SARS-COV-2 RNA SPEC QL NAA+PROBE: NEGATIVE
SARS-COV-2 RNA SPEC QL NAA+PROBE: NORMAL
SODIUM SERPL-SCNC: 141 MMOL/L (ref 133–144)
SPECIMEN SOURCE: NORMAL
SPECIMEN SOURCE: NORMAL
TUBE # CSF: 1 #
WBC # BLD AUTO: 2.4 10E9/L (ref 4–11)
WBC # CSF MANUAL: 1 /UL (ref 0–5)

## 2020-10-06 PROCEDURE — 258N000003 HC RX IP 258 OP 636: Performed by: NURSE ANESTHETIST, CERTIFIED REGISTERED

## 2020-10-06 PROCEDURE — 250N000011 HC RX IP 250 OP 636: Performed by: NURSE PRACTITIONER

## 2020-10-06 PROCEDURE — 258N000003 HC RX IP 258 OP 636: Performed by: NURSE PRACTITIONER

## 2020-10-06 PROCEDURE — G0463 HOSPITAL OUTPT CLINIC VISIT: HCPCS | Mod: 25 | Performed by: NURSE PRACTITIONER

## 2020-10-06 PROCEDURE — 89050 BODY FLUID CELL COUNT: CPT | Performed by: NURSE PRACTITIONER

## 2020-10-06 PROCEDURE — 80299 QUANTITATIVE ASSAY DRUG: CPT | Performed by: NURSE PRACTITIONER

## 2020-10-06 PROCEDURE — 250N000011 HC RX IP 250 OP 636: Mod: JW | Performed by: NURSE PRACTITIONER

## 2020-10-06 PROCEDURE — 250N000011 HC RX IP 250 OP 636

## 2020-10-06 PROCEDURE — 96409 CHEMO IV PUSH SNGL DRUG: CPT

## 2020-10-06 PROCEDURE — 999N000141 HC STATISTIC PRE-PROCEDURE NURSING ASSESSMENT: Performed by: NURSE PRACTITIONER

## 2020-10-06 PROCEDURE — 71046 X-RAY EXAM CHEST 2 VIEWS: CPT

## 2020-10-06 PROCEDURE — 71046 X-RAY EXAM CHEST 2 VIEWS: CPT | Mod: 26 | Performed by: RADIOLOGY

## 2020-10-06 PROCEDURE — 80053 COMPREHEN METABOLIC PANEL: CPT | Performed by: NURSE PRACTITIONER

## 2020-10-06 PROCEDURE — 84999 UNLISTED CHEMISTRY PROCEDURE: CPT | Performed by: NURSE PRACTITIONER

## 2020-10-06 PROCEDURE — 96450 CHEMOTHERAPY INTO CNS: CPT | Performed by: NURSE PRACTITIONER

## 2020-10-06 PROCEDURE — 85025 COMPLETE CBC W/AUTO DIFF WBC: CPT | Performed by: NURSE PRACTITIONER

## 2020-10-06 PROCEDURE — 99215 OFFICE O/P EST HI 40 MIN: CPT | Mod: 25 | Performed by: NURSE PRACTITIONER

## 2020-10-06 PROCEDURE — 370N000001 HC ANESTHESIA TECHNICAL FEE, 1ST 30 MIN: Performed by: NURSE PRACTITIONER

## 2020-10-06 PROCEDURE — U0003 INFECTIOUS AGENT DETECTION BY NUCLEIC ACID (DNA OR RNA); SEVERE ACUTE RESPIRATORY SYNDROME CORONAVIRUS 2 (SARS-COV-2) (CORONAVIRUS DISEASE [COVID-19]), AMPLIFIED PROBE TECHNIQUE, MAKING USE OF HIGH THROUGHPUT TECHNOLOGIES AS DESCRIBED BY CMS-2020-01-R: HCPCS | Performed by: PEDIATRICS

## 2020-10-06 PROCEDURE — 250N000011 HC RX IP 250 OP 636: Performed by: NURSE ANESTHETIST, CERTIFIED REGISTERED

## 2020-10-06 PROCEDURE — 250N000009 HC RX 250

## 2020-10-06 PROCEDURE — 99207 PR NO CHARGE LOS: CPT

## 2020-10-06 PROCEDURE — 999N000131 HC STATISTIC POST-PROCEDURE RECOVERY CARE: Performed by: NURSE PRACTITIONER

## 2020-10-06 RX ORDER — LIDOCAINE 40 MG/G
CREAM TOPICAL
Status: COMPLETED
Start: 2020-10-06 | End: 2020-10-06

## 2020-10-06 RX ORDER — PROPOFOL 10 MG/ML
INJECTION, EMULSION INTRAVENOUS CONTINUOUS PRN
Status: DISCONTINUED | OUTPATIENT
Start: 2020-10-06 | End: 2020-10-06

## 2020-10-06 RX ORDER — ONDANSETRON 2 MG/ML
INJECTION INTRAMUSCULAR; INTRAVENOUS PRN
Status: DISCONTINUED | OUTPATIENT
Start: 2020-10-06 | End: 2020-10-06

## 2020-10-06 RX ORDER — PREDNISONE 10 MG/1
TABLET ORAL
Qty: 40 TABLET | Refills: 2 | Status: CANCELLED | OUTPATIENT
Start: 2020-10-06

## 2020-10-06 RX ORDER — SODIUM CHLORIDE, SODIUM LACTATE, POTASSIUM CHLORIDE, CALCIUM CHLORIDE 600; 310; 30; 20 MG/100ML; MG/100ML; MG/100ML; MG/100ML
INJECTION, SOLUTION INTRAVENOUS CONTINUOUS PRN
Status: DISCONTINUED | OUTPATIENT
Start: 2020-10-06 | End: 2020-10-06

## 2020-10-06 RX ORDER — HEPARIN SODIUM,PORCINE 10 UNIT/ML
5 VIAL (ML) INTRAVENOUS ONCE
Status: DISCONTINUED | OUTPATIENT
Start: 2020-10-06 | End: 2020-10-06 | Stop reason: HOSPADM

## 2020-10-06 RX ORDER — LIDOCAINE 40 MG/G
CREAM TOPICAL
Status: DISCONTINUED | OUTPATIENT
Start: 2020-10-06 | End: 2020-10-06 | Stop reason: HOSPADM

## 2020-10-06 RX ORDER — HEPARIN SODIUM,PORCINE 10 UNIT/ML
VIAL (ML) INTRAVENOUS
Status: COMPLETED
Start: 2020-10-06 | End: 2020-10-06

## 2020-10-06 RX ORDER — CAFFEINE AND SODIUM BENZOATE 125 MG/ML
500 INJECTION, SOLUTION INTRAMUSCULAR; INTRAVENOUS ONCE
Status: DISCONTINUED | OUTPATIENT
Start: 2020-10-06 | End: 2020-10-06

## 2020-10-06 RX ORDER — PREDNISONE 10 MG/1
TABLET ORAL
Qty: 40 TABLET | Refills: 2 | Status: SHIPPED | OUTPATIENT
Start: 2020-10-06 | End: 2020-12-29

## 2020-10-06 RX ORDER — HEPARIN SODIUM (PORCINE) LOCK FLUSH IV SOLN 100 UNIT/ML 100 UNIT/ML
SOLUTION INTRAVENOUS
Status: COMPLETED
Start: 2020-10-06 | End: 2020-10-06

## 2020-10-06 RX ORDER — MULTIVIT-MIN/IRON/FOLIC ACID/K 18-600-40
1000 CAPSULE ORAL DAILY
Qty: 30 TABLET | Refills: 3 | Status: SHIPPED | OUTPATIENT
Start: 2020-10-06 | End: 2021-11-02

## 2020-10-06 RX ORDER — MERCAPTOPURINE 50 MG/1
50 TABLET ORAL DAILY
Qty: 30 TABLET | Refills: 2 | Status: CANCELLED | OUTPATIENT
Start: 2020-10-06 | End: 2020-12-29

## 2020-10-06 RX ORDER — HEPARIN SODIUM,PORCINE 10 UNIT/ML
3-6 VIAL (ML) INTRAVENOUS
Status: DISCONTINUED | OUTPATIENT
Start: 2020-10-06 | End: 2020-10-06 | Stop reason: HOSPADM

## 2020-10-06 RX ORDER — PROPOFOL 10 MG/ML
INJECTION, EMULSION INTRAVENOUS PRN
Status: DISCONTINUED | OUTPATIENT
Start: 2020-10-06 | End: 2020-10-06

## 2020-10-06 RX ORDER — MERCAPTOPURINE 50 MG/1
50 TABLET ORAL DAILY
Qty: 30 TABLET | Refills: 2 | Status: ON HOLD | OUTPATIENT
Start: 2020-10-06 | End: 2020-11-03

## 2020-10-06 RX ADMIN — LIDOCAINE: 40 CREAM TOPICAL at 12:15

## 2020-10-06 RX ADMIN — METHOTREXATE: 25 INJECTION INTRA-ARTERIAL; INTRAMUSCULAR; INTRATHECAL; INTRAVENOUS at 12:39

## 2020-10-06 RX ADMIN — VINCRISTINE SULFATE 2 MG: 1 INJECTION, SOLUTION INTRAVENOUS at 11:50

## 2020-10-06 RX ADMIN — Medication 50 UNITS: at 12:00

## 2020-10-06 RX ADMIN — PROPOFOL 80 MG: 10 INJECTION, EMULSION INTRAVENOUS at 12:44

## 2020-10-06 RX ADMIN — HEPARIN, PORCINE (PF) 10 UNIT/ML INTRAVENOUS SYRINGE 50 UNITS: at 12:00

## 2020-10-06 RX ADMIN — PROPOFOL 250 MCG/KG/MIN: 10 INJECTION, EMULSION INTRAVENOUS at 12:44

## 2020-10-06 RX ADMIN — ONDANSETRON 4 MG: 2 INJECTION INTRAMUSCULAR; INTRAVENOUS at 12:47

## 2020-10-06 RX ADMIN — HEPARIN 500 UNITS: 100 SYRINGE at 13:36

## 2020-10-06 RX ADMIN — SODIUM CHLORIDE, POTASSIUM CHLORIDE, SODIUM LACTATE AND CALCIUM CHLORIDE: 600; 310; 30; 20 INJECTION, SOLUTION INTRAVENOUS at 12:40

## 2020-10-06 ASSESSMENT — MIFFLIN-ST. JEOR
SCORE: 1848.49
SCORE: 1848.49

## 2020-10-06 ASSESSMENT — PAIN SCALES - GENERAL: PAINLEVEL: NO PAIN (0)

## 2020-10-06 NOTE — ANESTHESIA CARE TRANSFER NOTE
Patient: Lazaro Lund    Procedure(s):  Lumbar puncture with intrathecal Chemotherapy (not CD)    Diagnosis: Lymphoma (H) [C85.90]  Diagnosis Additional Information: No value filed.    Anesthesia Type:   No value filed.     Note:  Airway :Nasal Cannula  Patient transferred to:Phase II  Handoff Report: Identifed the Patient, Identified the Reponsible Provider, Reviewed the pertinent medical history, Discussed the surgical course, Reviewed Intra-OP anesthesia mangement and issues during anesthesia, Set expectations for post-procedure period and Allowed opportunity for questions and acknowledgement of understanding      Vitals: (Last set prior to Anesthesia Care Transfer)    CRNA VITALS  10/6/2020 1224 - 10/6/2020 1254      10/6/2020             Resp Rate (observed):  21                Electronically Signed By: YOHAN Seymour CRNA  October 6, 2020  12:54 PM

## 2020-10-06 NOTE — ANESTHESIA POSTPROCEDURE EVALUATION
Anesthesia POST Procedure Evaluation    Patient: Lazaro Lund   MRN:     [id number] Gender:   male   Age:    20 year old :      2000        Preoperative Diagnosis: Lymphoma (H) [C85.90]   Procedure(s):  Lumbar puncture with intrathecal Chemotherapy (not CD)   Postop Comments: No value filed.     Anesthesia Type: No value filed.       Disposition: Outpatient   Postop Pain Control: Uneventful            Sign Out: Well controlled pain   PONV: No   Neuro/Psych: Uneventful            Sign Out: Acceptable/Baseline neuro status   Airway/Respiratory: Uneventful            Sign Out: AIRWAY IN SITU/Resp. Support   CV/Hemodynamics: Uneventful            Sign Out: Acceptable CV status   Other NRE: NONE   DID A NON-ROUTINE EVENT OCCUR? No    Event details/Postop Comments:  I personally evaluated the patient at bedside. No anesthesia-related complications noted. Patient is hemodynamically stable with adequate control of pain and nausea. Ready for discharge from PACU. All questions were answered.    Willie Krueger MD  Pediatric Staff Anesthesiologist  [facility]  Pager [phone]  Phone [phone]          Last Anesthesia Record Vitals:  CRNA VITALS  10/6/2020 1224 - 10/6/2020 1324      10/6/2020             Resp Rate (observed):  24          Last PACU Vitals:  Vitals Value Taken Time   BP 72/43 10/06/20 1305   Temp 36.7  C (98  F) 10/06/20 1305   Pulse 62 10/06/20 1305   Resp 18 10/06/20 1305   SpO2 99 % 10/06/20 1305   Temp src     NIBP     Pulse     SpO2     Resp     Temp     Ht Rate     Temp 2           Electronically Signed By: Willie Krueger MD, 2020, 2:06 PM

## 2020-10-06 NOTE — ANESTHESIA PREPROCEDURE EVALUATION
Anesthesia Pre-Procedure Evaluation    Patient: Lazaro Lund   MRN:     [id number] Gender:   male   Age:    20 year old :      2000        Preoperative Diagnosis: Lymphoma (H) [C85.90]   Procedure(s):  Lumbar puncture with intrathecal Chemotherapy (not CD)     LABS:  CBC:   Lab Results   Component Value Date    WBC 2.4 (L) 10/06/2020    WBC 2.0 (L) 2020    HGB 12.2 (L) 10/06/2020    HGB 12.2 (L) 2020    HCT 36.0 (L) 10/06/2020    HCT 35.9 (L) 2020     10/06/2020     2020     BMP:   Lab Results   Component Value Date     10/06/2020     2020    POTASSIUM 3.9 10/06/2020    POTASSIUM 3.5 2020    CHLORIDE 108 10/06/2020    CHLORIDE 106 2020    CO2 29 10/06/2020    CO2 29 2020    BUN 17 10/06/2020    BUN 15 2020    CR 0.82 10/06/2020    CR 0.63 (L) 2020    GLC 93 10/06/2020     (H) 2020     COAGS:   Lab Results   Component Value Date    PTT 45 (H) 2019    INR 1.09 2019    FIBR 293 2019     POC:   Lab Results   Component Value Date    BGM 87 2019     OTHER:   Lab Results   Component Value Date    LACT 0.4 (L) 2019    MICHAEL 8.5 10/06/2020    PHOS 4.2 2019    MAG 2.1 2019    ALBUMIN 4.3 10/06/2020    PROTTOTAL 6.5 (L) 10/06/2020    ALT 19 10/06/2020    AST 9 10/06/2020    ALKPHOS 65 10/06/2020    BILITOTAL 0.5 10/06/2020    LIPASE 21 (L) 2020    AMYLASE 24 (L) 2020    .0 (H) 2019        Preop Vitals    BP Readings from Last 3 Encounters:   10/06/20 (!) 79/34   10/06/20 123/69   20 129/57    Pulse Readings from Last 3 Encounters:   10/06/20 63   10/06/20 79   20 79      Resp Readings from Last 3 Encounters:   10/06/20 17   10/06/20 16   20 16    SpO2 Readings from Last 3 Encounters:   10/06/20 97%   10/06/20 98%   20 99%      Temp Readings from Last 1 Encounters:   10/06/20 36.7  C (98  F) (Axillary)    Ht Readings from Last 1  "Encounters:   10/06/20 1.844 m (6' 0.6\")      Wt Readings from Last 1 Encounters:   10/06/20 80.1 kg (176 lb 9.4 oz)    Estimated body mass index is 23.56 kg/m  as calculated from the following:    Height as of this encounter: 1.844 m (6' 0.6\").    Weight as of this encounter: 80.1 kg (176 lb 9.4 oz).     LDA:  Port A Cath Single 10/24/19 Right Chest wall (Active)   Access Date 09/03/20 09/03/20 1320   Access Attempts 1 09/03/20 1320   Gauge Power noncoring 90 degree bend;20 gauge;3/4 inch 09/03/20 1320   Site Assessment WDL 10/06/20 1257   Line Status Infusing 10/06/20 1257   Extravasation? No 10/06/20 1257   Dressing Intervention Transparent 10/06/20 1257   Dressing change due 04/02/20 03/26/20 0845   Needle Change Due 04/02/20 03/26/20 0845   Line Necessity Yes, meets criteria 03/26/20 0845   De-Access Date 09/03/20 09/03/20 1430   Date to be Reflushed 10/01/20 09/03/20 1430   Number of days: 348       Airway - Adult/Peds (Active)   Number of days: 89        Past Medical History:   Diagnosis Date     Acute necrotizing pancreatitis 11/07/2019    attributed to asparaginase     Acute pancreatitis due to PEGaspariginase therapy  11/15/2019     DVT of upper extremity (deep vein thrombosis) (H) 09/26/2019    Bilateral      Edema of upper extremity 10/17/2019     Folliculitis 10/17/2019     Migraine 2006    have resolved     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      Past Surgical History:   Procedure Laterality Date     BONE MARROW BIOPSY, BONE SPECIMEN, NEEDLE/TROCAR Left 9/1/2019    Procedure: BIOPSY, BONE MARROW;  Surgeon: Heather Lopez MD;  Location: [facility]     INSERT PICC LINE N/A 8/31/2019    Procedure: INSERTION, PICC;  Surgeon: Michell Keith MD;  [facility]: [facility] OR     INSERT PORT VASCULAR ACCESS N/A 10/24/2019    Procedure: INSERTION, VASCULAR ACCESS PORT;  Surgeon: Silviano Martins MD;  [facility]      IR CHEST PORT PLACEMENT > 5 YRS OF AGE  10/24/2019     IR CHEST TUBE PLACEMENT " NON-TUNNELLED LEFT  8/31/2019     IR PICC PLACEMENT > 5 YRS OF AGE  8/31/2019     IR PORT CHECK RIGHT  11/12/2019     SPINAL PUNCTURE,LUMBAR, INTRATHECAL CHEMO DELIVERY N/A 8/31/2019    Procedure: LUMBAR PUNCTURE, WITH INTRATHECAL CHEMOTHERAPY ADMINISTRATION;  Surgeon: Heather Lopez MD;  [facility]     SPINAL PUNCTURE,LUMBAR, INTRATHECAL CHEMO DELIVERY N/A 9/9/2019    Procedure: Lumbar Puncture With Intrathecal Chemo;  Surgeon: Alexi Hayes MD;  [facility]     SPINAL PUNCTURE,LUMBAR, INTRATHECAL CHEMO DELIVERY N/A 10/10/2019    Procedure: Lumbar puncture with IT Chemo (CD);  Surgeon: Reynaldo Campuzano MD;  [facility]      SPINAL PUNCTURE,LUMBAR, INTRATHECAL CHEMO DELIVERY N/A 10/17/2019    Procedure: Lumbar puncture with IT Chemo (not CD);  Surgeon: Reynaldo Campuzano MD;  [facility]      SPINAL PUNCTURE,LUMBAR, INTRATHECAL CHEMO DELIVERY N/A 10/24/2019    Procedure: LUMBAR PUNCTURE, WITH INTRATHECAL CHEMOTHERAPY ADMINISTRATION;  Surgeon: Félix Van APRN CNP;  [facility]      SPINAL PUNCTURE,LUMBAR, INTRATHECAL CHEMO DELIVERY N/A 10/31/2019    Procedure: Lumbar puncture with IT Chemo (not CD);  Surgeon: Reynaldo Campuzano MD;  [facility]      SPINAL PUNCTURE,LUMBAR, INTRATHECAL CHEMO DELIVERY N/A 12/19/2019    Procedure: Lumbar puncture with IT Chem (CD);  Surgeon: Félix Van APRN CNP;  [facility]      SPINAL PUNCTURE,LUMBAR, INTRATHECAL CHEMO DELIVERY N/A 12/23/2019    Procedure: Lumbar puncture with IT Chem (CD);  Surgeon: Heather Lopez MD;  [facility]      SPINAL PUNCTURE,LUMBAR, INTRATHECAL CHEMO DELIVERY N/A 1/23/2020    Procedure: Lumbar puncture with IT Chem (CD);  Surgeon: Reynaldo Campuzano MD;  [facility]      SPINAL PUNCTURE,LUMBAR, INTRATHECAL CHEMO DELIVERY N/A 2/20/2020    Procedure: Lumbar puncture with  intrathecal Chemotherapy (CD);  Surgeon: Reynaldo Campuzano MD;  [facility]      SPINAL PUNCTURE,LUMBAR, INTRATHECAL CHEMO DELIVERY N/A 3/19/2020    Procedure: Lumbar puncture with intrathecal Chemotherapy (CD);  Surgeon: Reynaldo Campuzano MD;  [facility]      SPINAL PUNCTURE,LUMBAR, INTRATHECAL CHEMO DELIVERY N/A 3/26/2020    Procedure: Lumbar puncture with intrathecal Chemotherapy (not CD);  Surgeon: Todd Mercado MD;  [facility]      SPINAL PUNCTURE,LUMBAR, INTRATHECAL CHEMO DELIVERY N/A 4/23/2020    Procedure: Lumbar puncture with intrathecal Chemotherapy (CD);  Surgeon: Marleny Brewer MD;  [facility]      SPINAL PUNCTURE,LUMBAR, INTRATHECAL CHEMO DELIVERY N/A 5/14/2020    Procedure: Lumbar puncture with intrathecal Chemotherapy (not CD);  Surgeon: Todd Mercado MD;  [facility]      SPINAL PUNCTURE,LUMBAR, INTRATHECAL CHEMO DELIVERY N/A 7/9/2020    Procedure: Lumbar puncture with intrathecal Chemotherapy (CD);  Surgeon: Todd Mercado MD;  [facility]      SPINAL PUNCTURE,LUMBAR, INTRATHECAL CHEMO DELIVERY N/A 8/6/2020    Procedure: Lumbar puncture with intrathecal Chemotherapy (not CD);  Surgeon: Todd Mercado MD;  [facility]      THORACENTESIS N/A 8/31/2019    Procedure: Thoracentesis;  Surgeon: Michell Keith MD;  [facility]      Allergies   Allergen Reactions     Asparaginase Derivatives Other (See Comments)     Severe pancreatitis     No Known Drug Allergies         Anesthesia Evaluation    ROS/Med Hx    No history of anesthetic complications    Cardiovascular Findings   Comments: TTE 02/04/2020: Normal echocardiogram. Normal appearance and motion of the tricuspid, mitral, pulmonary and aortic valves. No atrial, ventricular or arterial level shunting. LV and RV have normal chamber size, wall thickness, and systolic function. LVEF 67 %.    Neuro  Findings   Comments: Migraine   Neuropathic pain   Insomnia due to medical condition         Pulmonary Findings - negative ROS    HENT Findings - negative HENT ROS    Skin Findings - negative skin ROS      GI/Hepatic/Renal Findings - negative ROS    Endocrine/Metabolic Findings       Comments: Vitamin D deficiency     Genetic/Syndrome Findings - negative genetics/syndromes ROS    Hematology/Oncology Findings   (+) cancer (  Lymphoma (H) ) and blood dyscrasia    Additional Notes  Edema of upper extremity  Smokes marijuana  Nicotine patch          PHYSICAL EXAM:   Mental Status/Neuro: A/A/O   Airway: Facies: Feasible  Mallampati: I  Mouth/Opening: Full  TM distance: > 6 cm  Neck ROM: Full   Respiratory: Auscultation: CTAB     Resp. Rate: Normal     Resp. Effort: Normal      CV: Rhythm: Regular  Rate: Age appropriate  Heart: Normal Sounds  Edema: None   Comments:      Dental: Normal Dentition                Assessment:   ASA SCORE: 3    H&P: History and physical reviewed and following examination; no interval change.    NPO Status: NPO Appropriate     Plan:   Anes. Type:  General   Pre-Medication: None   Induction:  IV (Standard)   Airway: Native Airway   Access/Monitoring: Central Access/Port present   Maintenance: Propofol Sedation     Postop Plan:   Postop Pain: None  Postop Sedation/Airway: Not planned     PONV Management:    Prevention:, Propofol     CONSENT: Direct conversation   Plan and risks discussed with: Patient   Blood Products: Consent Deferred (Minimal Blood Loss)       Comments for Plan/Consent:  - GA/natural airway, LMA/GETA as back-up  - Maintenance: TIVA with propofol    Risks and benefits of anesthetic approach, including but not limited to need for conversion to LMA/ETT, sore throat, hoarseness, mucosal injury, dental injury, bronchospasm/laryngospasm, PONV, aspiration, injury to blood vessels and/ or nerves, hemodynamic and respiratory issues including potential long term consequences, bleeding,  side effects of blood transfusion and postoperative delirium were discussed with parents and all questions were answered.    Willie Krueger MD  Pediatric Staff Anesthesiologist  [facility]  Pager [phone]  Phone [phone]            Willie Krueger MD

## 2020-10-06 NOTE — PROGRESS NOTES
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 Lazaro presented with acute onset cough and was found to have an anterior mediastinal mass and malignant left-sided pleural effusion.  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  He was admitted to [facility] oncology service 8/30 and started on treatment per [id number].  He had a pleural effusion that required a chest tube and a pericardial effusion that was not drained. His Induction was complicated by cardiac compression secondary to his mass leading to tamponade, this improved through out his hospital stay.  He also had dysphagia that improved with treatment of his mass, swallow study was normal. His course was recently complicated by extensive bilateral UE DVTs, and he was successfully treated with anticoagulation. His consolidation course was complicated by the development of severe asparaginase induced pancreatitis. He became quite ill with SIRS and associated hypotension, he required pressor support in the ICU. As a result, asparaginase was eliminated from his treatment plan. He was in CR status at end of consolidation. During maintenance, he developed hepatotoxicity so allopurinol and dose reduced 6MP were started for correction of skewed 6-MP metabolism.  Lazaro comes to clinic today for Day 1 of his 3rd Maintenance cycle.      HPI:   Since his last visit Lazaro has been doing well. His only concern today is some dysphagia he's had on about 4 occasions recently.  This has reminded him of his initial presentation when he had a large mediastinal mass which is concerning to him.  This does not happen with every meal but is distressing to him.  He has not had the sensation of choking.    Lazaro's energy level has been great.  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  He enjoys this.  Appetite  is strong.  No nausea, vomiting or diarrhea.  He has not had constipation.  Denies pain, no paresthesias.  He has not had fever.  No skin concerns.     Review of systems:  The 10 point Review of Systems is negative other than noted in the HPI or here.      PMH:   Past Medical History:   Diagnosis Date     Acute necrotizing pancreatitis 11/07/2019    attributed to asparaginase     Acute pancreatitis due to PEGaspariginase therapy  11/15/2019     DVT of upper extremity (deep vein thrombosis) (H) 09/26/2019    Bilateral      Edema of upper extremity 10/17/2019     Folliculitis 10/17/2019     Migraine 2006    have resolved     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     -- Spinal HA following LP  -- TPMT shows Intermediate activity with normal TPMT/NUDT15 genotype  -- Factor V leiden and prothrombin gene mutation negative  -- Necrotizing pancreatitis secondary to asparaginase  -- former smoke, quit with the use of nicotine patches. Does smoke marijuana daily, working on decreasing frequency     PFMH:   Family History   Problem Relation Age of Onset     No Known Problems Mother      No Known Problems Father      Asthma Brother      Thyroid Cancer Paternal Grandmother      Melanoma Paternal Aunt        Social History:   Social History     Social History Narrative    Lazaro previously lived at home with his dad and step mom in [place] but is now staying with his grandma in [place]. Biological mother is involved in his life. Has 4 step-siblings and 1 biological sibling. 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 Has completed high school. 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  He has been enjoying fishing and video games.   Smoking marijuana daily     Current Medications:  Current Outpatient Medications on File Prior to Visit   Medication Sig Dispense Refill     allopurinol (ZYLOPRIM)  100 MG tablet Take 1 tablet (100 mg) by mouth daily 30 tablet 11     diphenhydrAMINE (BENADRYL) 25 MG capsule Take 1-2 capsules (25-50 mg) by mouth every 6 hours as needed (Breakthrough Nausea and Vomiting ) 30 capsule 1     lidocaine-prilocaine (EMLA) 2.5-2.5 % external cream Apply topically as needed for other (prior to port access) 30 g 6     LORazepam (ATIVAN) 1 MG tablet Take 1 tablet prior to clinic visits for anticipatory vomiting. 10 tablet 0     mercaptopurine (PURINETHOL) 50 MG tablet Take 1 tablet (50 mg) by mouth daily Take 1 tablet (50mg) daily 26 tablet 2     methotrexate 2.5 MG tablet Take 17 tablets (42.5 mg) by mouth once a week Do not take on Days of LP. 68 tablet 2     methotrexate 2.5 MG tablet Take 17 tablets (42.5 mg) by mouth once a week Do not take on Days of LP. 68 tablet 2     ondansetron (ZOFRAN-ODT) 8 MG ODT tab Take 1 tablet (8 mg) by mouth every 8 hours as needed for nausea 20 tablet 6     pantoprazole (PROTONIX) 40 MG EC tablet Take 1 tablet (40 mg) by mouth every morning (before breakfast) 30 tablet 2     polyethylene glycol (MIRALAX/GLYCOLAX) packet Take 17 g by mouth daily 30 packet 0     predniSONE (DELTASONE) 10 MG tablet Take 45mg (4.5 tabs) in the AM and 40mg (4 tabs) in the PM for 5 days every 4 weeks. 43 tablet 2     predniSONE (DELTASONE) 10 MG tablet Take 45mg (4.5 tabs) in the AM and 40mg (4 tabs) in the PM for 5 days 43 tablet 2     scopolamine (TRANSDERM) 1 MG/3DAYS 72 hr patch Place 1 patch onto the skin every 72 hours 10 patch 3     scopolamine (TRANSDERM) 1 MG/3DAYS 72 hr patch Place 1 patch onto the skin every 72 hours 10 patch 3     sennosides (SENOKOT) 8.6 MG tablet Take 2 tablets by mouth 2 times daily as needed for constipation 60 each 1     sulfamethoxazole-trimethoprim (BACTRIM DS) 800-160 MG tablet Take 1 tablet by mouth Every Mon, Tues two times daily 16 tablet 11     Vitamin D, Cholecalciferol, 25 MCG (1000 UT) TABS Take 2 tablets (2,000 Units) by mouth daily  "60 tablet 3     Reviewed medications with Lazaro.  Not taking ativan or scopolamine.  Confirmed oral chemo doses, has not missed any doses. He is not taking Vitamin D currently.  Has not yet received [id number] influenza vaccine      Physical Exam:   Temp:  [98.3  F (36.8  C)] 98.3  F (36.8  C)  Pulse:  [79] 79  Resp:  [16] 16  BP: (123)/(69) 123/69  SpO2:  [98 %] 98 %  Wt Readings from Last 4 Encounters:   10/06/20 80.1 kg (176 lb 9.4 oz)   09/03/20 81.7 kg (180 lb 1.9 oz)   08/06/20 81.7 kg (180 lb 1.9 oz)   08/06/20 81.7 kg (180 lb 1.9 oz)     Ht Readings from Last 2 Encounters:   10/06/20 1.844 m (6' 0.6\")   09/03/20 1.838 m (6' 0.36\")     General: Lazaro is alert, interactive and appropriate.  HEENT: Skull is atrauamatic and normocephalic.  Full head of hair. PERRLA, sclera are non icteric and not injected, EOM are intact. Nares are patent without drainage. Oropharynx clear, no plaques noted. Buccal mucosa and tongue clear. MMM.  Neck:  Supple without lymphadenopathy.   Lymph: There is no cervical, supraclavicular, axillary, lymphadenopathy palpated.  Cardiovascular:  HR is regular, S1, S2 no murmur.  Capillary refill is < 2 seconds.   Respiratory: No cough noted. Respirations are easy.  Lungs are clear to auscultation throughout.  No crackles or wheezes.  Gastrointestinal: BS present in all quadrants.  Abdomen is soft and flat.  No pain with palpation. No hepatosplenomegaly or masses are palpated.  Skin: Indurated nodule on thigh with inflammed hair follicle   Neurological:  CN 2-12 grossly intact. Gait is normal.  No issues with balance. Sensation intact in hands and feet.     Musculoskeletal:  Good strength and ROM in all extremities.  Strong dorsiflexion at ankles and great toes (5/5) bilaterally without any pain at the Achilles.     Labs:   Results for orders placed or performed during the hospital encounter of 10/06/20   CBC with platelets differential     Status: Abnormal   Result Value Ref Range    WBC " 2.4 (L) 4.0 - 11.0 10e9/L    RBC Count 3.50 (L) 4.4 - 5.9 10e12/L    Hemoglobin 12.2 (L) 13.3 - 17.7 g/dL    Hematocrit 36.0 (L) 40.0 - 53.0 %     (H) 78 - 100 fl    MCH 34.9 (H) 26.5 - 33.0 pg    MCHC 33.9 31.5 - 36.5 g/dL    RDW 14.8 10.0 - 15.0 %    Platelet Count 208 150 - 450 10e9/L    Diff Method Automated Method     % Neutrophils 68.6 %    % Lymphocytes 20.0 %    % Monocytes 7.2 %    % Eosinophils 3.4 %    % Basophils 0.4 %    % Immature Granulocytes 0.4 %    Nucleated RBCs 0 0 /100    Absolute Neutrophil 1.6 1.6 - 8.3 10e9/L    Absolute Lymphocytes 0.5 (L) 0.8 - 5.3 10e9/L    Absolute Monocytes 0.2 0.0 - 1.3 10e9/L    Absolute Eosinophils 0.1 0.0 - 0.7 10e9/L    Absolute Basophils 0.0 0.0 - 0.2 10e9/L    Abs Immature Granulocytes 0.0 0 - 0.4 10e9/L    Absolute Nucleated RBC 0.0    Send to [facility] for Thiopurine Metabolites (Test Code: 04431). Collect minimum 2.5 mL in EDTA tube. Ship refrigerated.: Laboratory Miscellaneous Order     Status: None   Result Value Ref Range    Miscellaneous Test         Specimen Received, Reordered and sent to Performing laboratory - Report to follow upon   completion.     Comprehensive metabolic panel     Status: Abnormal   Result Value Ref Range    Sodium 141 133 - 144 mmol/L    Potassium 3.9 3.4 - 5.3 mmol/L    Chloride 108 94 - 109 mmol/L    Carbon Dioxide 29 20 - 32 mmol/L    Anion Gap 4 3 - 14 mmol/L    Glucose 93 70 - 99 mg/dL    Urea Nitrogen 17 7 - 30 mg/dL    Creatinine 0.82 0.66 - 1.25 mg/dL    GFR Estimate >90 >60 mL/min/[1.73_m2]    GFR Estimate If Black >90 >60 mL/min/[1.73_m2]    Calcium 8.5 8.5 - 10.1 mg/dL    Bilirubin Total 0.5 0.2 - 1.3 mg/dL    Albumin 4.3 3.4 - 5.0 g/dL    Protein Total 6.5 (L) 6.8 - 8.8 g/dL    Alkaline Phosphatase 65 40 - 150 U/L    ALT 19 0 - 70 U/L    AST 9 0 - 45 U/L   Asymptomatic COVID-19 Virus (Coronavirus) by PCR     Status: None    Specimen: Nasopharyngeal   Result Value Ref Range    COVID-19 Virus PCR to [facility] -  Source Nasopharyngeal     COVID-19 Virus PCR to [facility] - Result       Test received-See reflex to [facility] test SARS CoV2 (COVID-19) Virus RT-PCR   Send outs misc test     Status: None (In process)   Result Value Ref Range    Test Name Thiopurine Metabolites     Send Outs Misc Test Code 99967     Send Outs Misc Test Specimen Whole blood, EDTA anticoagulant     Location Performed [facility]     Result PENDING      A Lumbar Puncture was performed in the Pediatric Sedation Suite. Informed consent was obtained prior to the procedure. Lazaro Lund was identified by facial recognition and ID arm band. A time-out was performed. Lazaro Lund was then placed in the left lateral decubitus position and the lumbosacral area was sterily prepped using Chloraprep followed by drape placement. Anatomic landmarks were identified by palpation. Then, a 22 gauge, 3.5 inch Slava spinal needle was easily inserted into the L3/L4 interspace. On the first attempt approximately 3 mL of clear and colorless cerebrospinal fluid was obtained to be sent to the lab for cell count analysis and cytospin. Following that, 15mg of intrathecal methotrexate in 6 mL of preservative-free normal saline was infused without resistance. The needle was removed and a Band-Aid applied. Lazaro Lund tolerated this procedure very well.    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  He is being treated per [facility] protocol [id number]. He's had a CRu following Induction with a CR at the end of Consolidation. His course was complicated by extensive bilateral DVT and severe necrotizing pancreatitis.       He comes to clinic today for Day 1 Cycle 3 of Maintenance therapy. Overall, he has been doing well. He is currently on 33% full dose of 6MP with allopurinol for correction of skewed 6-MP metabolism and 100% methotrexate. ANC is just above target range, was in target range last month.   He has new onset mild dysphagia, this is causing him some anxiety given he presented with similar symptoms.     Plan:   1) Labs reviewed with Lazaro. Had oringially planned to adjust methotrexate down due to BSA however given ANC is above target range will continue at 17 tabs.  Continue 6MP at 50mg daily (33% full dose).  If ANC >1.5 next month plan a small increase in 6MP.  2) Start prednisone burst.  3) Chest xray obtained, no signs of mediastinal mass.  If symptoms of dysphagia persist Lazaro will be in touch.   4) Plan for influenza vaccine however did not give today since Lazaro is starting steroids.  Recommend he get locally in about 3 weeks, he plans to do this at [facility].  5) Protonix during steroid bursts.  6) Most recent Vit D level was normal however Lazaro should continue on Vit D, recommend he restart at 1000 international units daily.    7) Bactrim for PCP prophylaxis.  8) Continue to check CMP and TGNs monthly moving forward.  9) Discussed with Lazaro that he will need COVID testing prior to LPs moving forward.  He prefers to come the day of, have it done in sedation and then come to clinic for chemo while he is waiting for results.   10) RTC in 4 weeks for D29 of Cycle 3 with LP. No longer wants to get caffeine after LPs.    Félix Van, CNP

## 2020-10-06 NOTE — DISCHARGE INSTRUCTIONS
Home Instructions for Your Child after Sedation  Today your child received (medicine):  Propofol and Zofran  Please keep this form with your health records  Your child may be more sleepy and irritable today than normal. Also, an adult should stay with your child for the rest of the day. The medicine may make the child dizzy. Avoid activities that require balance (bike riding, skating, climbing stairs, walking).  Remember:    When your child wants to eat again, start with liquids (juice, soda pop, Popsicles). If your child feels well enough, you may try a regular diet. It is best to offer light meals for the first 24 hours.    If your child has nausea (feels sick to the stomach) or vomiting (throws up), give small amounts of clear liquids (7-Up, Sprite, apple juice or broth). Fluids are more important than food until your child is feeling better.    Wait 24 hours before giving medicine that contains alcohol. This includes liquid cold, cough and allergy medicines (Robitussin, Vicks Formula 44 for children, Benadryl, Chlor-Trimeton).    If you will leave your child with a , give the sitter a copy of these instructions.  Call your doctor if:    You have questions about the test results.    Your child vomits (throws up) more than two times.    Your child is very fussy or irritable.    You have trouble waking your child.     If your child has trouble breathing, call 911.  If you have any questions or concerns, please call:  Pediatric Sedation Unit [phone]  Pediatric clinic  [phone]  [facility]  [phone] (ask for the Pediatric Anesthesiologist doctor on call)  Emergency department [phone]  [facility] toll-free number [phone] (Monday--Friday, 8 a.m. to 4:30 p.m.)  I understand these instructions. I have all of my personal belongings.      [facility]   [phone]    Care post Lumbar Puncture     Do not remove bandage/dressing for 24 hours -- after this time they can be  removed    No bath, shower or soaking of the dressing for 24 hours    Activity as tolerated by the patient    Diet as able to tolerated    May use Tylenol as needed for pain control -- DO NOT use Ibuprofen    Can apply icepack to the site for discomfort -- no more than 10 minutes at a time    If bleeding presents apply pressure for 5 minutes    Call [phone] ask for Peds BMT/Hem/Onc fellow on call if complications arise including:    persistent bleeding    fever greater than 100.5    Pain    Lumbar punctures can cause headache. If the pain is not controlled with Tylenol (acetaminophen) please call the Peds BMT/Hem/Onc fellow on call

## 2020-10-06 NOTE — LETTER
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 Lazaro presented with acute onset cough and was found to have an anterior mediastinal mass and malignant left-sided pleural effusion.  A CT guided biopsy was obtained at [facility] and pathology was consistent with T-cell leukemia vs lymphoma.  He was admitted to [facility] oncology service 8/30 and started on treatment per [id number].  He had a pleural effusion that required a chest tube and a pericardial effusion that was not drained. His Induction was complicated by cardiac compression secondary to his mass leading to tamponade, this improved through out his hospital stay.  He also had dysphagia that improved with treatment of his mass, swallow study was normal. His course was recently complicated by extensive bilateral UE DVTs, and he was successfully treated with anticoagulation. His consolidation course was complicated by the development of severe asparaginase induced pancreatitis. He became quite ill with SIRS and associated hypotension, he required pressor support in the ICU. As a result, asparaginase was eliminated from his treatment plan. He was in CR status at end of consolidation. During maintenance, he developed hepatotoxicity so allopurinol and dose reduced 6MP were started for correction of skewed 6-MP metabolism.  Lazaro comes to clinic today for Day 1 of his 3rd Maintenance cycle.      HPI:   Since his last visit Lazaro has been doing well. His only concern today is some dysphagia he's had on about 4 occasions recently.  This has reminded him of his initial presentation when he had a large mediastinal mass which is concerning to him.  This does not happen with every meal but is distressing to him.  He has not had the sensation of choking.    Lazaro's energy level has been great.  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  He enjoys this.  Appetite is strong.  No nausea, vomiting or diarrhea.  He has not had constipation.  Denies pain, no paresthesias.  He has not had fever.  No skin concerns.     Review of systems:  The 10 point Review of Systems is negative other than noted in the HPI or here.      PMH:   Past Medical History:   Diagnosis Date     Acute necrotizing pancreatitis 11/07/2019    attributed to asparaginase     Acute pancreatitis due to PEGaspariginase therapy  11/15/2019     DVT of upper extremity (deep vein thrombosis) (H) 09/26/2019    Bilateral      Edema of upper extremity 10/17/2019     Folliculitis 10/17/2019     Migraine 2006    have resolved     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     -- Spinal HA following LP  -- TPMT shows Intermediate activity with normal TPMT/NUDT15 genotype  -- Factor V leiden and prothrombin gene mutation negative  -- Necrotizing pancreatitis secondary to asparaginase  -- former smoke, quit with the use of nicotine patches. Does smoke marijuana daily, working on decreasing frequency     PFMH:   Family History   Problem Relation Age of Onset     No Known Problems Mother      No Known Problems Father      Asthma Brother      Thyroid Cancer Paternal Grandmother      Melanoma Paternal Aunt        Social History:   Social History     Social History Narrative    Lazaro previously lived at home with his dad and step mom in [place] but is now staying with his grandma in [place]. Biological mother is involved in his life. Has 4 step-siblings and 1 biological sibling. 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 Has completed high school. 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  He has been enjoying fishing and video games.   Smoking marijuana daily     Current Medications:  Current Outpatient  Medications on File Prior to Visit   Medication Sig Dispense Refill     allopurinol (ZYLOPRIM) 100 MG tablet Take 1 tablet (100 mg) by mouth daily 30 tablet 11     diphenhydrAMINE (BENADRYL) 25 MG capsule Take 1-2 capsules (25-50 mg) by mouth every 6 hours as needed (Breakthrough Nausea and Vomiting ) 30 capsule 1     lidocaine-prilocaine (EMLA) 2.5-2.5 % external cream Apply topically as needed for other (prior to port access) 30 g 6     LORazepam (ATIVAN) 1 MG tablet Take 1 tablet prior to clinic visits for anticipatory vomiting. 10 tablet 0     mercaptopurine (PURINETHOL) 50 MG tablet Take 1 tablet (50 mg) by mouth daily Take 1 tablet (50mg) daily 26 tablet 2     methotrexate 2.5 MG tablet Take 17 tablets (42.5 mg) by mouth once a week Do not take on Days of LP. 68 tablet 2     methotrexate 2.5 MG tablet Take 17 tablets (42.5 mg) by mouth once a week Do not take on Days of LP. 68 tablet 2     ondansetron (ZOFRAN-ODT) 8 MG ODT tab Take 1 tablet (8 mg) by mouth every 8 hours as needed for nausea 20 tablet 6     pantoprazole (PROTONIX) 40 MG EC tablet Take 1 tablet (40 mg) by mouth every morning (before breakfast) 30 tablet 2     polyethylene glycol (MIRALAX/GLYCOLAX) packet Take 17 g by mouth daily 30 packet 0     predniSONE (DELTASONE) 10 MG tablet Take 45mg (4.5 tabs) in the AM and 40mg (4 tabs) in the PM for 5 days every 4 weeks. 43 tablet 2     predniSONE (DELTASONE) 10 MG tablet Take 45mg (4.5 tabs) in the AM and 40mg (4 tabs) in the PM for 5 days 43 tablet 2     scopolamine (TRANSDERM) 1 MG/3DAYS 72 hr patch Place 1 patch onto the skin every 72 hours 10 patch 3     scopolamine (TRANSDERM) 1 MG/3DAYS 72 hr patch Place 1 patch onto the skin every 72 hours 10 patch 3     sennosides (SENOKOT) 8.6 MG tablet Take 2 tablets by mouth 2 times daily as needed for constipation 60 each 1     sulfamethoxazole-trimethoprim (BACTRIM DS) 800-160 MG tablet Take 1 tablet by mouth Every Mon, Tues two times daily 16 tablet 11  "    Vitamin D, Cholecalciferol, 25 MCG (1000 UT) TABS Take 2 tablets (2,000 Units) by mouth daily 60 tablet 3     Reviewed medications with Lazaro.  Not taking ativan or scopolamine.  Confirmed oral chemo doses, has not missed any doses. He is not taking Vitamin D currently.  Has not yet received [id number] influenza vaccine      Physical Exam:   Temp:  [98.3  F (36.8  C)] 98.3  F (36.8  C)  Pulse:  [79] 79  Resp:  [16] 16  BP: (123)/(69) 123/69  SpO2:  [98 %] 98 %  Wt Readings from Last 4 Encounters:   10/06/20 80.1 kg (176 lb 9.4 oz)   09/03/20 81.7 kg (180 lb 1.9 oz)   08/06/20 81.7 kg (180 lb 1.9 oz)   08/06/20 81.7 kg (180 lb 1.9 oz)     Ht Readings from Last 2 Encounters:   10/06/20 1.844 m (6' 0.6\")   09/03/20 1.838 m (6' 0.36\")     General: Lazaro is alert, interactive and appropriate.  HEENT: Skull is atrauamatic and normocephalic.  Full head of hair. PERRLA, sclera are non icteric and not injected, EOM are intact. Nares are patent without drainage. Oropharynx clear, no plaques noted. Buccal mucosa and tongue clear. MMM.  Neck:  Supple without lymphadenopathy.   Lymph: There is no cervical, supraclavicular, axillary, lymphadenopathy palpated.  Cardiovascular:  HR is regular, S1, S2 no murmur.  Capillary refill is < 2 seconds.   Respiratory: No cough noted. Respirations are easy.  Lungs are clear to auscultation throughout.  No crackles or wheezes.  Gastrointestinal: BS present in all quadrants.  Abdomen is soft and flat.  No pain with palpation. No hepatosplenomegaly or masses are palpated.  Skin: Indurated nodule on thigh with inflammed hair follicle   Neurological:  CN 2-12 grossly intact. Gait is normal.  No issues with balance. Sensation intact in hands and feet.     Musculoskeletal:  Good strength and ROM in all extremities.  Strong dorsiflexion at ankles and great toes (5/5) bilaterally without any pain at the Achilles.     Labs:   Results for orders placed or performed during the hospital encounter of " 10/06/20   CBC with platelets differential     Status: Abnormal   Result Value Ref Range    WBC 2.4 (L) 4.0 - 11.0 10e9/L    RBC Count 3.50 (L) 4.4 - 5.9 10e12/L    Hemoglobin 12.2 (L) 13.3 - 17.7 g/dL    Hematocrit 36.0 (L) 40.0 - 53.0 %     (H) 78 - 100 fl    MCH 34.9 (H) 26.5 - 33.0 pg    MCHC 33.9 31.5 - 36.5 g/dL    RDW 14.8 10.0 - 15.0 %    Platelet Count 208 150 - 450 10e9/L    Diff Method Automated Method     % Neutrophils 68.6 %    % Lymphocytes 20.0 %    % Monocytes 7.2 %    % Eosinophils 3.4 %    % Basophils 0.4 %    % Immature Granulocytes 0.4 %    Nucleated RBCs 0 0 /100    Absolute Neutrophil 1.6 1.6 - 8.3 10e9/L    Absolute Lymphocytes 0.5 (L) 0.8 - 5.3 10e9/L    Absolute Monocytes 0.2 0.0 - 1.3 10e9/L    Absolute Eosinophils 0.1 0.0 - 0.7 10e9/L    Absolute Basophils 0.0 0.0 - 0.2 10e9/L    Abs Immature Granulocytes 0.0 0 - 0.4 10e9/L    Absolute Nucleated RBC 0.0    Send to [facility] for Thiopurine Metabolites (Test Code: 62690). Collect minimum 2.5 mL in EDTA tube. Ship refrigerated.: Laboratory Miscellaneous Order     Status: None   Result Value Ref Range    Miscellaneous Test         Specimen Received, Reordered and sent to Performing laboratory - Report to follow upon   completion.     Comprehensive metabolic panel     Status: Abnormal   Result Value Ref Range    Sodium 141 133 - 144 mmol/L    Potassium 3.9 3.4 - 5.3 mmol/L    Chloride 108 94 - 109 mmol/L    Carbon Dioxide 29 20 - 32 mmol/L    Anion Gap 4 3 - 14 mmol/L    Glucose 93 70 - 99 mg/dL    Urea Nitrogen 17 7 - 30 mg/dL    Creatinine 0.82 0.66 - 1.25 mg/dL    GFR Estimate >90 >60 mL/min/[1.73_m2]    GFR Estimate If Black >90 >60 mL/min/[1.73_m2]    Calcium 8.5 8.5 - 10.1 mg/dL    Bilirubin Total 0.5 0.2 - 1.3 mg/dL    Albumin 4.3 3.4 - 5.0 g/dL    Protein Total 6.5 (L) 6.8 - 8.8 g/dL    Alkaline Phosphatase 65 40 - 150 U/L    ALT 19 0 - 70 U/L    AST 9 0 - 45 U/L   Asymptomatic COVID-19 Virus (Coronavirus) by PCR      Status: None    Specimen: Nasopharyngeal   Result Value Ref Range    COVID-19 Virus PCR to [facility] - Source Nasopharyngeal     COVID-19 Virus PCR to [facility] - Result       Test received-See reflex to [facility] test SARS CoV2 (COVID-19) Virus RT-PCR   Send outs misc test     Status: None (In process)   Result Value Ref Range    Test Name Thiopurine Metabolites     Send Outs Misc Test Code 08861     Send Outs Misc Test Specimen Whole blood, EDTA anticoagulant     Location Performed [facility]     Result PENDING      A Lumbar Puncture was performed in the Pediatric Sedation Suite. Informed consent was obtained prior to the procedure. Lazaro Lund was identified by facial recognition and ID arm band. A time-out was performed. Lazaro Lund was then placed in the left lateral decubitus position and the lumbosacral area was sterily prepped using Chloraprep followed by drape placement. Anatomic landmarks were identified by palpation. Then, a 22 gauge, 3.5 inch Slava spinal needle was easily inserted into the L3/L4 interspace. On the first attempt approximately 3 mL of clear and colorless cerebrospinal fluid was obtained to be sent to the lab for cell count analysis and cytospin. Following that, 15mg of intrathecal methotrexate in 6 mL of preservative-free normal saline was infused without resistance. The needle was removed and a Band-Aid applied. Lazaro Lund tolerated this procedure very well.    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  He is being treated per [facility] protocol [id number]. He's had a CRu following Induction with a CR at the end of Consolidation. His course was complicated by extensive bilateral DVT and severe necrotizing pancreatitis.       He comes to clinic today for Day 1 Cycle 3 of Maintenance therapy. Overall, he has been doing well. He is currently on 33% full dose of 6MP with allopurinol for correction of skewed  6-MP metabolism and 100% methotrexate. ANC is just above target range, was in target range last month.  He has new onset mild dysphagia, this is causing him some anxiety given he presented with similar symptoms.     Plan:   1) Labs reviewed with Lazaro. Had oringially planned to adjust methotrexate down due to BSA however given ANC is above target range will continue at 17 tabs.  Continue 6MP at 50mg daily (33% full dose).  If ANC >1.5 next month plan a small increase in 6MP.  2) Start prednisone burst.  3) Chest xray obtained, no signs of mediastinal mass.  If symptoms of dysphagia persist Lazaro will be in touch.   4) Plan for influenza vaccine however did not give today since Lazaro is starting steroids.  Recommend he get locally in about 3 weeks, he plans to do this at [facility].  5) Protonix during steroid bursts.  6) Most recent Vit D level was normal however Lazaro should continue on Vit D, recommend he restart at 1000 international units daily.    7) Bactrim for PCP prophylaxis.  8) Continue to check CMP and TGNs monthly moving forward.  9) Discussed with Lazaro that he will need COVID testing prior to LPs moving forward.  He prefers to come the day of, have it done in sedation and then come to clinic for chemo while he is waiting for results.   10) RTC in 4 weeks for D29 of Cycle 3 with LP. No longer wants to get caffeine after LPs.    Félix Van, CNP

## 2020-10-06 NOTE — PROGRESS NOTES
Infusion Nursing Note    Lazaro PLUNKETT Kalerobbin Presents to [facility]fusion [facility] today for: Vincristine    Due to : T lymphoblastic lymphoma (H)    Intravenous Access/Labs: Port accessed using sterile technique. Labs drawn from port.    Infusion Note: After port access, patient went down to radiology for CXR. Upon return, Vincristine given via gravity. Blood return note pre/mid/post infusion. After infusion complete, port heparin locked.     Discharge Plan:   Patient sent to Peds Sedation in stable condition to have LP procedure done.

## 2020-10-08 RX ORDER — ALLOPURINOL 100 MG/1
100 TABLET ORAL DAILY
Qty: 30 TABLET | Refills: 11 | Status: SHIPPED | OUTPATIENT
Start: 2020-10-08 | End: 2021-08-10

## 2020-10-13 LAB — LAB SCANNED RESULT: ABNORMAL

## 2020-11-02 ENCOUNTER — TELEPHONE (OUTPATIENT)
Dept: PEDIATRIC HEMATOLOGY/ONCOLOGY | Facility: CLINIC | Age: 20
End: 2020-11-02

## 2020-11-03 ENCOUNTER — OFFICE VISIT (OUTPATIENT)
Dept: PEDIATRIC HEMATOLOGY/ONCOLOGY | Facility: CLINIC | Age: 20
End: 2020-11-03
Attending: PEDIATRICS
Payer: COMMERCIAL

## 2020-11-03 ENCOUNTER — HOSPITAL ENCOUNTER (OUTPATIENT)
Facility: CLINIC | Age: 20
Discharge: HOME | End: 2020-11-03
Attending: PEDIATRICS | Admitting: PEDIATRICS
Payer: COMMERCIAL

## 2020-11-03 ENCOUNTER — ANESTHESIA EVENT (OUTPATIENT)
Dept: PEDIATRICS | Facility: CLINIC | Age: 20
End: 2020-11-03
Payer: COMMERCIAL

## 2020-11-03 ENCOUNTER — INFUSION THERAPY VISIT (OUTPATIENT)
Dept: INFUSION THERAPY | Facility: CLINIC | Age: 20
End: 2020-11-03
Attending: PEDIATRICS
Payer: COMMERCIAL

## 2020-11-03 ENCOUNTER — ANESTHESIA (OUTPATIENT)
Dept: PEDIATRICS | Facility: CLINIC | Age: 20
End: 2020-11-03
Payer: COMMERCIAL

## 2020-11-03 VITALS
RESPIRATION RATE: 19 BRPM | OXYGEN SATURATION: 98 % | DIASTOLIC BLOOD PRESSURE: 48 MMHG | HEART RATE: 56 BPM | TEMPERATURE: 97.8 F | SYSTOLIC BLOOD PRESSURE: 88 MMHG

## 2020-11-03 VITALS
DIASTOLIC BLOOD PRESSURE: 67 MMHG | WEIGHT: 177.91 LBS | OXYGEN SATURATION: 99 % | SYSTOLIC BLOOD PRESSURE: 120 MMHG | HEIGHT: 73 IN | HEART RATE: 63 BPM | BODY MASS INDEX: 23.58 KG/M2 | TEMPERATURE: 98.2 F | RESPIRATION RATE: 16 BRPM

## 2020-11-03 DIAGNOSIS — Z11.59 ENCOUNTER FOR SCREENING FOR OTHER VIRAL DISEASES: ICD-10-CM

## 2020-11-03 DIAGNOSIS — C83.50 T LYMPHOBLASTIC LYMPHOMA (H): Primary | ICD-10-CM

## 2020-11-03 DIAGNOSIS — C83.50 T LYMPHOBLASTIC LYMPHOMA (H): ICD-10-CM

## 2020-11-03 LAB
ALBUMIN SERPL-MCNC: 4.1 G/DL (ref 3.4–5)
ALP SERPL-CCNC: 68 U/L (ref 40–150)
ALT SERPL W P-5'-P-CCNC: 21 U/L (ref 0–70)
ANION GAP SERPL CALCULATED.3IONS-SCNC: 5 MMOL/L (ref 3–14)
AST SERPL W P-5'-P-CCNC: 7 U/L (ref 0–45)
BASOPHILS # BLD AUTO: 0 10E9/L (ref 0–0.2)
BASOPHILS NFR BLD AUTO: 0.6 %
BILIRUB SERPL-MCNC: 0.6 MG/DL (ref 0.2–1.3)
BUN SERPL-MCNC: 15 MG/DL (ref 7–30)
CALCIUM SERPL-MCNC: 8.5 MG/DL (ref 8.5–10.1)
CHLORIDE SERPL-SCNC: 110 MMOL/L (ref 94–109)
CO2 SERPL-SCNC: 28 MMOL/L (ref 20–32)
CREAT SERPL-MCNC: 0.65 MG/DL (ref 0.66–1.25)
DIFFERENTIAL METHOD BLD: ABNORMAL
EOSINOPHIL # BLD AUTO: 0.1 10E9/L (ref 0–0.7)
EOSINOPHIL NFR BLD AUTO: 1.9 %
ERYTHROCYTE [DISTWIDTH] IN BLOOD BY AUTOMATED COUNT: 14.2 % (ref 10–15)
GFR SERPL CREATININE-BSD FRML MDRD: >90 ML/MIN/{1.73_M2}
GLUCOSE SERPL-MCNC: 93 MG/DL (ref 70–99)
HCT VFR BLD AUTO: 34.2 % (ref 40–53)
HGB BLD-MCNC: 11.8 G/DL (ref 13.3–17.7)
IMM GRANULOCYTES # BLD: 0 10E9/L (ref 0–0.4)
IMM GRANULOCYTES NFR BLD: 0.3 %
LABORATORY COMMENT REPORT: NORMAL
LYMPHOCYTES # BLD AUTO: 0.5 10E9/L (ref 0.8–5.3)
LYMPHOCYTES NFR BLD AUTO: 15.6 %
MCH RBC QN AUTO: 35.2 PG (ref 26.5–33)
MCHC RBC AUTO-ENTMCNC: 34.5 G/DL (ref 31.5–36.5)
MCV RBC AUTO: 102 FL (ref 78–100)
MISCELLANEOUS TEST: NORMAL
MONOCYTES # BLD AUTO: 0.2 10E9/L (ref 0–1.3)
MONOCYTES NFR BLD AUTO: 6.3 %
NEUTROPHILS # BLD AUTO: 2.4 10E9/L (ref 1.6–8.3)
NEUTROPHILS NFR BLD AUTO: 75.3 %
NRBC # BLD AUTO: 0 10*3/UL
NRBC BLD AUTO-RTO: 0 /100
PLATELET # BLD AUTO: 181 10E9/L (ref 150–450)
POTASSIUM SERPL-SCNC: 3.8 MMOL/L (ref 3.4–5.3)
PROT SERPL-MCNC: 6.4 G/DL (ref 6.8–8.8)
RBC # BLD AUTO: 3.35 10E12/L (ref 4.4–5.9)
SARS-COV-2 RNA SPEC QL NAA+PROBE: NEGATIVE
SARS-COV-2 RNA SPEC QL NAA+PROBE: NORMAL
SODIUM SERPL-SCNC: 143 MMOL/L (ref 133–144)
SPECIMEN SOURCE: NORMAL
SPECIMEN SOURCE: NORMAL
WBC # BLD AUTO: 3.2 10E9/L (ref 4–11)

## 2020-11-03 PROCEDURE — U0003 INFECTIOUS AGENT DETECTION BY NUCLEIC ACID (DNA OR RNA); SEVERE ACUTE RESPIRATORY SYNDROME CORONAVIRUS 2 (SARS-COV-2) (CORONAVIRUS DISEASE [COVID-19]), AMPLIFIED PROBE TECHNIQUE, MAKING USE OF HIGH THROUGHPUT TECHNOLOGIES AS DESCRIBED BY CMS-2020-01-R: HCPCS | Performed by: PEDIATRICS

## 2020-11-03 PROCEDURE — 250N000011 HC RX IP 250 OP 636

## 2020-11-03 PROCEDURE — 96409 CHEMO IV PUSH SNGL DRUG: CPT

## 2020-11-03 PROCEDURE — 250N000011 HC RX IP 250 OP 636: Performed by: NURSE ANESTHETIST, CERTIFIED REGISTERED

## 2020-11-03 PROCEDURE — 99215 OFFICE O/P EST HI 40 MIN: CPT | Mod: 25 | Performed by: PEDIATRICS

## 2020-11-03 PROCEDURE — 84999 UNLISTED CHEMISTRY PROCEDURE: CPT | Performed by: NURSE PRACTITIONER

## 2020-11-03 PROCEDURE — 96450 CHEMOTHERAPY INTO CNS: CPT | Performed by: PEDIATRICS

## 2020-11-03 PROCEDURE — 250N000011 HC RX IP 250 OP 636: Performed by: NURSE PRACTITIONER

## 2020-11-03 PROCEDURE — 80299 QUANTITATIVE ASSAY DRUG: CPT | Performed by: NURSE PRACTITIONER

## 2020-11-03 PROCEDURE — 370N000001 HC ANESTHESIA TECHNICAL FEE, 1ST 30 MIN: Performed by: PEDIATRICS

## 2020-11-03 PROCEDURE — 999N000131 HC STATISTIC POST-PROCEDURE RECOVERY CARE: Performed by: PEDIATRICS

## 2020-11-03 PROCEDURE — 96450 CHEMOTHERAPY INTO CNS: CPT | Mod: GC | Performed by: PEDIATRICS

## 2020-11-03 PROCEDURE — 250N000009 HC RX 250: Performed by: NURSE ANESTHETIST, CERTIFIED REGISTERED

## 2020-11-03 PROCEDURE — 258N000003 HC RX IP 258 OP 636: Performed by: PEDIATRICS

## 2020-11-03 PROCEDURE — 258N000003 HC RX IP 258 OP 636: Performed by: NURSE PRACTITIONER

## 2020-11-03 PROCEDURE — 99207 PR NO CHARGE LOS: CPT

## 2020-11-03 PROCEDURE — 89050 BODY FLUID CELL COUNT: CPT | Performed by: NURSE PRACTITIONER

## 2020-11-03 PROCEDURE — 999N000141 HC STATISTIC PRE-PROCEDURE NURSING ASSESSMENT: Performed by: PEDIATRICS

## 2020-11-03 PROCEDURE — 250N000009 HC RX 250

## 2020-11-03 PROCEDURE — 250N000011 HC RX IP 250 OP 636: Performed by: PEDIATRICS

## 2020-11-03 PROCEDURE — 80053 COMPREHEN METABOLIC PANEL: CPT | Performed by: NURSE PRACTITIONER

## 2020-11-03 PROCEDURE — 85025 COMPLETE CBC W/AUTO DIFF WBC: CPT | Performed by: NURSE PRACTITIONER

## 2020-11-03 PROCEDURE — 258N000003 HC RX IP 258 OP 636: Performed by: NURSE ANESTHETIST, CERTIFIED REGISTERED

## 2020-11-03 RX ORDER — LIDOCAINE 40 MG/G
CREAM TOPICAL
Status: COMPLETED
Start: 2020-11-03 | End: 2020-11-03

## 2020-11-03 RX ORDER — SODIUM CHLORIDE, SODIUM LACTATE, POTASSIUM CHLORIDE, CALCIUM CHLORIDE 600; 310; 30; 20 MG/100ML; MG/100ML; MG/100ML; MG/100ML
INJECTION, SOLUTION INTRAVENOUS CONTINUOUS PRN
Status: DISCONTINUED | OUTPATIENT
Start: 2020-11-03 | End: 2020-11-03

## 2020-11-03 RX ORDER — PROPOFOL 10 MG/ML
INJECTION, EMULSION INTRAVENOUS CONTINUOUS PRN
Status: DISCONTINUED | OUTPATIENT
Start: 2020-11-03 | End: 2020-11-03

## 2020-11-03 RX ORDER — HEPARIN SODIUM,PORCINE 10 UNIT/ML
VIAL (ML) INTRAVENOUS
Status: DISCONTINUED
Start: 2020-11-03 | End: 2020-11-03 | Stop reason: HOSPADM

## 2020-11-03 RX ORDER — ONDANSETRON 2 MG/ML
INJECTION INTRAMUSCULAR; INTRAVENOUS PRN
Status: DISCONTINUED | OUTPATIENT
Start: 2020-11-03 | End: 2020-11-03

## 2020-11-03 RX ORDER — LIDOCAINE 40 MG/G
CREAM TOPICAL
Status: DISCONTINUED | OUTPATIENT
Start: 2020-11-03 | End: 2020-11-03 | Stop reason: HOSPADM

## 2020-11-03 RX ORDER — HEPARIN SODIUM,PORCINE 10 UNIT/ML
3-6 VIAL (ML) INTRAVENOUS
Status: DISCONTINUED | OUTPATIENT
Start: 2020-11-03 | End: 2020-11-03 | Stop reason: HOSPADM

## 2020-11-03 RX ORDER — PROPOFOL 10 MG/ML
INJECTION, EMULSION INTRAVENOUS PRN
Status: DISCONTINUED | OUTPATIENT
Start: 2020-11-03 | End: 2020-11-03

## 2020-11-03 RX ORDER — HEPARIN SODIUM,PORCINE 10 UNIT/ML
5 VIAL (ML) INTRAVENOUS ONCE
Status: DISCONTINUED | OUTPATIENT
Start: 2020-11-03 | End: 2020-11-03 | Stop reason: HOSPADM

## 2020-11-03 RX ORDER — MERCAPTOPURINE 50 MG/1
TABLET ORAL
Qty: 32 TABLET | Refills: 2 | Status: SHIPPED | OUTPATIENT
Start: 2020-11-03 | End: 2020-12-29

## 2020-11-03 RX ORDER — HEPARIN SODIUM (PORCINE) LOCK FLUSH IV SOLN 100 UNIT/ML 100 UNIT/ML
SOLUTION INTRAVENOUS
Status: COMPLETED
Start: 2020-11-03 | End: 2020-11-03

## 2020-11-03 RX ADMIN — METHOTREXATE: 25 INJECTION INTRA-ARTERIAL; INTRAMUSCULAR; INTRATHECAL; INTRAVENOUS at 13:39

## 2020-11-03 RX ADMIN — PROPOFOL 50 MG: 10 INJECTION, EMULSION INTRAVENOUS at 13:45

## 2020-11-03 RX ADMIN — PROPOFOL 80 MG: 10 INJECTION, EMULSION INTRAVENOUS at 13:41

## 2020-11-03 RX ADMIN — VINCRISTINE SULFATE 2 MG: 1 INJECTION, SOLUTION INTRAVENOUS at 11:36

## 2020-11-03 RX ADMIN — HEPARIN 500 UNITS: 100 SYRINGE at 14:38

## 2020-11-03 RX ADMIN — LIDOCAINE: 40 CREAM TOPICAL at 14:00

## 2020-11-03 RX ADMIN — SODIUM CHLORIDE 50 ML: 9 INJECTION, SOLUTION INTRAVENOUS at 11:32

## 2020-11-03 RX ADMIN — SODIUM CHLORIDE, POTASSIUM CHLORIDE, SODIUM LACTATE AND CALCIUM CHLORIDE: 600; 310; 30; 20 INJECTION, SOLUTION INTRAVENOUS at 13:41

## 2020-11-03 RX ADMIN — PROPOFOL 20 MG: 10 INJECTION, EMULSION INTRAVENOUS at 13:43

## 2020-11-03 RX ADMIN — PROPOFOL 300 MCG/KG/MIN: 10 INJECTION, EMULSION INTRAVENOUS at 13:41

## 2020-11-03 RX ADMIN — ONDANSETRON 4 MG: 2 INJECTION INTRAMUSCULAR; INTRAVENOUS at 13:49

## 2020-11-03 RX ADMIN — HEPARIN, PORCINE (PF) 10 UNIT/ML INTRAVENOUS SYRINGE 5 ML: at 11:32

## 2020-11-03 ASSESSMENT — MIFFLIN-ST. JEOR: SCORE: 1854.49

## 2020-11-03 ASSESSMENT — PAIN SCALES - GENERAL: PAINLEVEL: NO PAIN (0)

## 2020-11-03 NOTE — PATIENT INSTRUCTIONS
Your dose of 6-mercaptopurine (6-MP) was increased today. You should take 1 tablet (50mg) Monday through Friday and 1.5 tablets (75mg) on Saturday and Sunday

## 2020-11-03 NOTE — PROGRESS NOTES
Infusion Nursing Note    Lazaro Lund Presents to [facility] Infusion [facility] today for: D29 C3 Vincristine    Due to :    T lymphoblastic lymphoma (H)  Encounter for screening for other viral diseases    Intravenous Access/Labs: Port accessed using sterile technique. + blood return and labs drawn.     Coping:   Child Family Life declined    Infusion Note: Pt reports feeling well today; denies any recent fever/infections, no new issues/concerns. Port accessed and labs drawn. Vincristine given by gravity as ordered; + blood return noted pre/mid/post infusion. Port heparin locked at completion of infusion. Pt seen by Dr. Fischer while in clinic; to be seen by Dr. Campuzano in Peds Sedation. RN report called to Peds Sedation. Pt to go to Peds Sedation following apt in [facility]; pt left clinic stable condition.    Discharge Plan:   Patient verbalized understanding of discharge instructions.  RN reviewed that pt should return to clinic on 12/1/2020.  Pt left [facility] in stable condition.

## 2020-11-03 NOTE — PROGRESS NOTES
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 Lazaro presented with acute onset cough and was found to have an anterior mediastinal mass and malignant left-sided pleural effusion.  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  He was admitted to [facility] oncology service 8/30 and started on treatment per [id number].  He had a pleural effusion that required a chest tube and a pericardial effusion that was not drained. His Induction was complicated by cardiac compression secondary to his mass leading to tamponade, this improved through out his hospital stay.  He also had dysphagia that improved with treatment of his mass, swallow study was normal. His course was recently complicated by extensive bilateral UE DVTs, and he was successfully treated with anticoagulation. His consolidation course was complicated by the development of severe asparaginase induced pancreatitis. He became quite ill with SIRS and associated hypotension, he required pressor support in the ICU. As a result, asparaginase was eliminated from his treatment plan. He was in CR status at end of consolidation. During maintenance, he developed hepatotoxicity so allopurinol and dose reduced 6MP were started for correction of skewed 6-MP metabolism.  Lazaro comes to clinic today for Day 29 of his 3rd Maintenance cycle.      HPI:   Since his last visit Lazaro has been doing well. He has a normal appetite. He had one occasion of nausea but this was not persistent. He has not had any regular tripping but he reports an occasion of his leg feeling numb after positioning it abnormally for a long time playing video games and had a fall when he went to walk. This numbness resolved after 30 minutes. He has good energy.  No constipation nor diarrhea.  Denies pain, no paresthesias.  He has not had fever.  No skin concerns.      Review of systems:  The 10 point Review of Systems is negative other than noted in the HPI or here.      PMH:   Past Medical History:   Diagnosis Date     Acute necrotizing pancreatitis 11/07/2019    attributed to asparaginase     Acute pancreatitis due to PEGaspariginase therapy  11/15/2019     DVT of upper extremity (deep vein thrombosis) (H) 09/26/2019    Bilateral      Edema of upper extremity 10/17/2019     Folliculitis 10/17/2019     Migraine 2006    have resolved     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     -- Spinal HA following LP  -- TPMT shows Intermediate activity with normal TPMT/NUDT15 genotype  -- Factor V leiden and prothrombin gene mutation negative  -- Necrotizing pancreatitis secondary to asparaginase  -- former smoke, quit with the use of nicotine patches. Does smoke marijuana daily, working on decreasing frequency     PFMH:   Family History   Problem Relation Age of Onset     No Known Problems Mother      No Known Problems Father      Asthma Brother      Thyroid Cancer Paternal Grandmother      Melanoma Paternal Aunt        Social History:   Social History     Social History Narrative    Lazaro previously lived at home with his dad and step mom in [place] but is now staying with his grandma in [place]. Biological mother is involved in his life. Has 4 step-siblings and 1 biological sibling. 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 Has completed high school. 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  He has been enjoying fishing and video games.   Smoking marijuana daily     Current Medications:  Current Outpatient Medications on File Prior to Visit   Medication Sig Dispense Refill     allopurinol (ZYLOPRIM) 100 MG tablet Take 1 tablet (100 mg) by mouth daily 30 tablet 11     diphenhydrAMINE (BENADRYL) 25 MG capsule Take 1-2 capsules (25-50 mg) by mouth  "every 6 hours as needed (Breakthrough Nausea and Vomiting ) (Patient not taking: Reported on 10/6/2020) 30 capsule 1     lidocaine-prilocaine (EMLA) 2.5-2.5 % external cream Apply topically as needed for other (prior to port access) (Patient not taking: Reported on 10/6/2020) 30 g 6     methotrexate 2.5 MG tablet Take 17 tablets (42.5mg) weekly on Tuesdays. Do not take on Days of LP. 68 tablet 2     ondansetron (ZOFRAN-ODT) 8 MG ODT tab Take 1 tablet (8 mg) by mouth every 8 hours as needed for nausea 20 tablet 6     pantoprazole (PROTONIX) 40 MG EC tablet Take 1 tablet (40 mg) by mouth every morning (before breakfast) 30 tablet 2     polyethylene glycol (MIRALAX/GLYCOLAX) packet Take 17 g by mouth daily 30 packet 0     predniSONE (DELTASONE) 10 MG tablet Take 40mg (4 tabs) twice dialy for 5 days every 4 weeks. 40 tablet 2     sennosides (SENOKOT) 8.6 MG tablet Take 2 tablets by mouth 2 times daily as needed for constipation 60 each 1     sulfamethoxazole-trimethoprim (BACTRIM DS) 800-160 MG tablet Take 1 tablet by mouth Every Mon, Tues two times daily 16 tablet 11     Vitamin D, Cholecalciferol, 25 MCG (1000 UT) TABS Take 1 tablet (1,000 Units) by mouth daily 30 tablet 3     Reviewed medications with Lazaro.  Not taking ativan or scopolamine.  Confirmed oral chemo doses, has not missed any doses. He is not taking Vitamin D currently.  Has not yet received [id number] influenza vaccine      Physical Exam:   Temp:  [98.2  F (36.8  C)] 98.2  F (36.8  C)  Pulse:  [63] 63  Resp:  [16] 16  BP: (120)/(67) 120/67  SpO2:  [99 %] 99 %  Wt Readings from Last 4 Encounters:   11/03/20 80.7 kg (177 lb 14.6 oz)   10/06/20 80.1 kg (176 lb 9.4 oz)   10/06/20 80.1 kg (176 lb 9.4 oz)   09/03/20 81.7 kg (180 lb 1.9 oz)     Ht Readings from Last 2 Encounters:   11/03/20 1.844 m (6' 0.6\")   10/06/20 1.844 m (6' 0.6\")     General: Lazaro is alert, interactive and appropriate.  HEENT: Skull is atrauamatic and normocephalic.  Full head of " hair. PERRLA, sclera are non icteric and not injected, EOM are intact. Nares are patent without drainage. Oropharynx clear, no plaques noted. Buccal mucosa and tongue clear. MMM.  Neck:  Supple without lymphadenopathy.   Lymph: There is no cervical, supraclavicular, axillary, lymphadenopathy palpated.  Cardiovascular:  HR is regular, S1, S2 no murmur.  Capillary refill is < 2 seconds.   Respiratory: No cough noted. Respirations are easy.  Lungs are clear to auscultation throughout.  No crackles or wheezes.  Gastrointestinal: BS present in all quadrants.  Abdomen is soft and flat.  No pain with palpation. No hepatosplenomegaly or masses are palpated.  Skin: Indurated nodule on thigh with inflammed hair follicle   Neurological:  CN 2-12 grossly intact. Gait is normal.  No issues with balance. Sensation intact in hands and feet.     Musculoskeletal:  Good strength and ROM in all extremities.  Strong dorsiflexion at ankles and great toes (5/5) bilaterally without any pain at the Achilles.     Labs:   Results for orders placed or performed in visit on 11/03/20   CBC with platelets differential     Status: Abnormal   Result Value Ref Range    WBC 3.2 (L) 4.0 - 11.0 10e9/L    RBC Count 3.35 (L) 4.4 - 5.9 10e12/L    Hemoglobin 11.8 (L) 13.3 - 17.7 g/dL    Hematocrit 34.2 (L) 40.0 - 53.0 %     (H) 78 - 100 fl    MCH 35.2 (H) 26.5 - 33.0 pg    MCHC 34.5 31.5 - 36.5 g/dL    RDW 14.2 10.0 - 15.0 %    Platelet Count 181 150 - 450 10e9/L    Diff Method Automated Method     % Neutrophils 75.3 %    % Lymphocytes 15.6 %    % Monocytes 6.3 %    % Eosinophils 1.9 %    % Basophils 0.6 %    % Immature Granulocytes 0.3 %    Nucleated RBCs 0 0 /100    Absolute Neutrophil 2.4 1.6 - 8.3 10e9/L    Absolute Lymphocytes 0.5 (L) 0.8 - 5.3 10e9/L    Absolute Monocytes 0.2 0.0 - 1.3 10e9/L    Absolute Eosinophils 0.1 0.0 - 0.7 10e9/L    Absolute Basophils 0.0 0.0 - 0.2 10e9/L    Abs Immature Granulocytes 0.0 0 - 0.4 10e9/L    Absolute  Nucleated RBC 0.0    Comprehensive metabolic panel     Status: Abnormal   Result Value Ref Range    Sodium 143 133 - 144 mmol/L    Potassium 3.8 3.4 - 5.3 mmol/L    Chloride 110 (H) 94 - 109 mmol/L    Carbon Dioxide 28 20 - 32 mmol/L    Anion Gap 5 3 - 14 mmol/L    Glucose 93 70 - 99 mg/dL    Urea Nitrogen 15 7 - 30 mg/dL    Creatinine 0.65 (L) 0.66 - 1.25 mg/dL    GFR Estimate >90 >60 mL/min/[1.73_m2]    GFR Estimate If Black >90 >60 mL/min/[1.73_m2]    Calcium 8.5 8.5 - 10.1 mg/dL    Bilirubin Total 0.6 0.2 - 1.3 mg/dL    Albumin 4.1 3.4 - 5.0 g/dL    Protein Total 6.4 (L) 6.8 - 8.8 g/dL    Alkaline Phosphatase 68 40 - 150 U/L    ALT 21 0 - 70 U/L    AST 7 0 - 45 U/L       11/3/20 Therapeutic Lumbar Puncture  A Lumbar Puncture was performed in the Pediatric Sedation Suite. Informed consent was obtained prior to the procedure. Lazaro Lund was identified by facial recognition and ID arm band. A time-out was performed. Lazaro Lund was then placed in the left lateral decubitus position and the lumbosacral area was sterily prepped using Chloraprep followed by drape placement. Anatomic landmarks were identified by palpation. Then, a 22 gauge, 3.5 inch Slava spinal needle was easily inserted into the L3/L4 interspace. On the first attempt approximately 3 mL of clear and colorless cerebrospinal fluid was obtained to be sent to the lab for cell count analysis and cytospin. Following that, 15mg of intrathecal methotrexate in 6 mL of preservative-free normal saline was infused without resistance. The needle was removed and a Band-Aid applied. Lazaro Lund tolerated this procedure very well.  Signed,  Nicho Fischer   Pediatric Hematology/Oncology Fellow    I was present and observed the fellow during the entire procedure.    Reynaldo Campuzano MD  Pediatric Hematology/Oncology  [facility]  Pager [phone]      Assessment:  Lazaro Lund is a 20 year old young man with  T cell lymphoblastic lymphoma (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  He is being treated per COG protocol [id number]. He's had a CRu following Induction with a CR at the end of Consolidation. His course was complicated by extensive bilateral DVT and severe necrotizing pancreatitis requiring cessation of PEG-asparaginase from his treatment.       He comes to clinic today for Day 29 Cycle 3 of Maintenance therapy. Overall, he has been doing well. He is currently on 33% full dose of 6MP with allopurinol for correction of skewed 6-MP metabolism and 100% methotrexate. ANC is above target range on two occasions so meets criteria to increase dose of 6-MP    Plan:   1) Labs reviewed with Lazaro. Increase 6MP to 50mg (M-F) and 75mg (Sat-Sun); this represents 47% of full dose. Continue full dose PO methotrexate.  2) Start prednisone burst.  4) Plan for influenza vaccine however did not give today since Lazaro is starting steroids.  Recommend he get locally in about 3 weeks - reminded again to get this done  5) Protonix during steroid bursts.  6) Most recent Vit D level was normal however Lazaro should continue on Vit D 1000IU daily  7) Bactrim for PCP prophylaxis.  8) Continue to check CMP and TGNs monthly moving forward.  9) Discussed with Lazaro that he will need COVID testing prior to LPs moving forward.  He prefers to come the day of, have it done in sedation and then come to clinic for chemo while he is waiting for results.   10) RTC in 4 weeks for D57 of Cycle 3     Signed,  Nicho Fischer   Pediatric Hematology/Oncology Fellow    Physician Attestation    I saw and evaluated the patient. I discussed with the fellow and agree with the findings and plan as documented in the fellow's note.     Reynaldo Campuzano MD  Pediatric Hematology/Oncology  [facility]

## 2020-11-03 NOTE — DISCHARGE INSTRUCTIONS
Home Instructions for Your Child after Sedation  Today your child received (medicine):  Propofol and Zofran  Please keep this form with your health records  Your child may be more sleepy and irritable today than normal. Also, an adult should stay with your child for the rest of the day. The medicine may make the child dizzy. Avoid activities that require balance (bike riding, skating, climbing stairs, walking).  Remember:    When your child wants to eat again, start with liquids (juice, soda pop, Popsicles). If your child feels well enough, you may try a regular diet. It is best to offer light meals for the first 24 hours.    If your child has nausea (feels sick to the stomach) or vomiting (throws up), give small amounts of clear liquids (7-Up, Sprite, apple juice or broth). Fluids are more important than food until your child is feeling better.    Wait 24 hours before giving medicine that contains alcohol. This includes liquid cold, cough and allergy medicines (Robitussin, Vicks Formula 44 for children, Benadryl, Chlor-Trimeton).    If you will leave your child with a , give the sitter a copy of these instructions.  Call your doctor if:    You have questions about the test results.    Your child vomits (throws up) more than two times.    Your child is very fussy or irritable.    You have trouble waking your child.     If your child has trouble breathing, call 911.  If you have any questions or concerns, please call:  Pediatric Sedation Unit [phone]  Pediatric clinic  [phone]  [facility]  [phone] (ask for the Pediatric Anesthesiologist doctor on call)  Emergency department [phone]  [facility] toll-free number [phone] (Monday--Friday, 8 a.m. to 4:30 p.m.)  I understand these instructions. I have all of my personal belongings.        [facility]   [phone]    Care post Lumbar Puncture     Do not remove bandage/dressing for 24 hours -- after this time they can  be removed    No bath, shower or soaking of the dressing for 24 hours    Activity as tolerated by the patient    Diet as able to tolerated    May use Tylenol as needed for pain control -- DO NOT use Ibuprofen    Can apply icepack to the site for discomfort -- no more than 10 minutes at a time    If bleeding presents apply pressure for 5 minutes    Call [phone] ask for Peds BMT/Hem/Onc fellow on call if complications arise including:    persistent bleeding    fever greater than 100.5    Pain    Lumbar punctures can cause headache. If the pain is not controlled with Tylenol (acetaminophen) please call the Peds BMT/Hem/Onc fellow on call

## 2020-11-03 NOTE — ANESTHESIA CARE TRANSFER NOTE
Patient: Lazaro Lund    Procedure(s):  Lumbar puncture with intrathecal Chemotherapy (not CD)    Diagnosis: Lymphoma (H) [C85.90]  Diagnosis Additional Information: No value filed.    Anesthesia Type:   General     Note:  Airway :Nasal Cannula          Vitals: (Last set prior to Anesthesia Care Transfer)    CRNA VITALS  11/3/2020 1325 - 11/3/2020 1401      11/3/2020             NIBP:  91/44    Pulse:  61    NIBP Mean:  63    SpO2:  100 %    Resp Rate (observed):  19                Electronically Signed By: YOHAN Melendrez [facility]  November 3, 2020  2:01 PM

## 2020-11-03 NOTE — ANESTHESIA PREPROCEDURE EVALUATION
Anesthesia Pre-Procedure Evaluation    Patient: Lazaro Lund   MRN:     [id number] Gender:   male   Age:    20 year old :      2000        Preoperative Diagnosis: Lymphoma (H) [C85.90]   Procedure(s):  Lumbar puncture with intrathecal Chemotherapy (not CD)     LABS:  CBC:   Lab Results   Component Value Date    WBC 3.2 (L) 2020    WBC 2.4 (L) 10/06/2020    HGB 11.8 (L) 2020    HGB 12.2 (L) 10/06/2020    HCT 34.2 (L) 2020    HCT 36.0 (L) 10/06/2020     2020     10/06/2020     BMP:   Lab Results   Component Value Date     2020     10/06/2020    POTASSIUM 3.8 2020    POTASSIUM 3.9 10/06/2020    CHLORIDE 110 (H) 2020    CHLORIDE 108 10/06/2020    CO2 28 2020    CO2 29 10/06/2020    BUN 15 2020    BUN 17 10/06/2020    CR 0.65 (L) 2020    CR 0.82 10/06/2020    GLC 93 2020    GLC 93 10/06/2020     COAGS:   Lab Results   Component Value Date    PTT 45 (H) 2019    INR 1.09 2019    FIBR 293 2019     POC:   Lab Results   Component Value Date    BGM 87 2019     OTHER:   Lab Results   Component Value Date    LACT 0.4 (L) 2019    MICHAEL 8.5 2020    PHOS 4.2 2019    MAG 2.1 2019    ALBUMIN 4.1 2020    PROTTOTAL 6.4 (L) 2020    ALT 21 2020    AST 7 2020    ALKPHOS 68 2020    BILITOTAL 0.6 2020    LIPASE 21 (L) 2020    AMYLASE 24 (L) 2020    .0 (H) 2019        Preop Vitals    BP Readings from Last 3 Encounters:   20 120/67   10/06/20 103/40   10/06/20 123/69    Pulse Readings from Last 3 Encounters:   20 63   10/06/20 76   10/06/20 79      Resp Readings from Last 3 Encounters:   20 16   10/06/20 18   10/06/20 16    SpO2 Readings from Last 3 Encounters:   20 99%   10/06/20 100%   10/06/20 98%      Temp Readings from Last 1 Encounters:   20 36.8  C (98.2  F) (Oral)    Ht Readings from Last 1  "Encounters:   11/03/20 1.844 m (6' 0.6\")      Wt Readings from Last 1 Encounters:   11/03/20 80.7 kg (177 lb 14.6 oz)    Estimated body mass index is 23.73 kg/m  as calculated from the following:    Height as of an earlier encounter on 11/3/20: 1.844 m (6' 0.6\").    Weight as of an earlier encounter on 11/3/20: 80.7 kg (177 lb 14.6 oz).     LDA:  Port A Cath Single 10/24/19 Right Chest wall (Active)   Access Date 11/03/20 11/03/20 1100   Access Attempts 1 11/03/20 1100   Gauge Power noncoring 90 degree bend;20 gauge;3/4 inch 11/03/20 1100   Site Assessment WDL 11/03/20 1207   Line Status Heparin locked 11/03/20 1207   Extravasation? No 11/03/20 1207   Dressing Intervention Transparent 11/03/20 1207   Dressing change due 04/02/20 03/26/20 0845   Needle Change Due 04/02/20 03/26/20 0845   Line Necessity Yes, meets criteria 03/26/20 0845   De-Access Date 10/06/20 10/06/20 1340   Date to be Reflushed 11/03/20 10/06/20 1340   Number of days: 376       Airway - Adult/Peds (Active)   Number of days: 117        Past Medical History:   Diagnosis Date     Acute necrotizing pancreatitis 11/07/2019    attributed to asparaginase     Acute pancreatitis due to PEGaspariginase therapy  11/15/2019     DVT of upper extremity (deep vein thrombosis) (H) 09/26/2019    Bilateral      Edema of upper extremity 10/17/2019     Folliculitis 10/17/2019     Migraine 2006    have resolved     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      Past Surgical History:   Procedure Laterality Date     BONE MARROW BIOPSY, BONE SPECIMEN, NEEDLE/TROCAR Left 9/1/2019    Procedure: BIOPSY, BONE MARROW;  Surgeon: Heather Lopez MD;  Location: [facility] OR     INSERT PICC LINE N/A 8/31/2019    Procedure: INSERTION, PICC;  Surgeon: Michell Keith MD;  [facility]: [facility] OR     INSERT PORT VASCULAR ACCESS N/A 10/24/2019    Procedure: INSERTION, VASCULAR ACCESS PORT;  Surgeon: Silviano Martins MD;  Location: [facility] PEDS SEDATION      IR CHEST PORT PLACEMENT > 5 YRS OF AGE "  10/24/2019     IR CHEST TUBE PLACEMENT NON-TUNNELLED LEFT  8/31/2019     IR PICC PLACEMENT > 5 YRS OF AGE  8/31/2019     IR PORT CHECK RIGHT  11/12/2019     SPINAL PUNCTURE,LUMBAR, INTRATHECAL CHEMO DELIVERY N/A 8/31/2019    Procedure: LUMBAR PUNCTURE, WITH INTRATHECAL CHEMOTHERAPY ADMINISTRATION;  Surgeon: Heather Lopez MD;  [facility] OR     SPINAL PUNCTURE,LUMBAR, INTRATHECAL CHEMO DELIVERY N/A 9/9/2019    Procedure: Lumbar Puncture With Intrathecal Chemo;  Surgeon: Alexi Hayes MD;  [facility] OR     SPINAL PUNCTURE,LUMBAR, INTRATHECAL CHEMO DELIVERY N/A 10/10/2019    Procedure: Lumbar puncture with IT Chemo (CD);  Surgeon: Reynaldo Campuzano MD;  [facility] P[facility] SED[facility]      SPINAL PUNCTURE,LUMBAR, INTRATHECAL CHEMO DELIVERY N/A 10/17/2019    Procedure: Lumbar puncture with IT Chemo (not CD);  Surgeon: Reynaldo Campuzano MD;  [facility]      SPINAL PUNCTURE,LUMBAR, INTRATHECAL CHEMO DELIVERY N/A 10/24/2019    Procedure: LUMBAR PUNCTURE, WITH INTRATHECAL CHEMOTHERAPY ADMINISTRATION;  Surgeon: Félix Van APRN CNP;  [facility]      SPINAL PUNCTURE,LUMBAR, INTRATHECAL CHEMO DELIVERY N/A 10/31/2019    Procedure: Lumbar puncture with IT Chemo (not CD);  Surgeon: Reynaldo Campuzano MD;  [facility]      SPINAL PUNCTURE,LUMBAR, INTRATHECAL CHEMO DELIVERY N/A 12/19/2019    Procedure: Lumbar puncture with IT Chem (CD);  Surgeon: Félix Van APRN CNP;  [facility]      SPINAL PUNCTURE,LUMBAR, INTRATHECAL CHEMO DELIVERY N/A 12/23/2019    Procedure: Lumbar puncture with IT Chem (CD);  Surgeon: Heather Lopez MD;  [facility]      SPINAL PUNCTURE,LUMBAR, INTRATHECAL CHEMO DELIVERY N/A 1/23/2020    Procedure: Lumbar puncture with IT Chem (CD);  Surgeon: Reynaldo Campuzano MD;  [facility]      SPINAL PUNCTURE,LUMBAR, INTRATHECAL CHEMO DELIVERY N/A 2/20/2020     Procedure: Lumbar puncture with intrathecal Chemotherapy (CD);  Surgeon: Reynaldo Campuzano MD;  [facility]      SPINAL PUNCTURE,LUMBAR, INTRATHECAL CHEMO DELIVERY N/A 3/19/2020    Procedure: Lumbar puncture with intrathecal Chemotherapy (CD);  Surgeon: Reynaldo Campuzano MD;  [facility]      SPINAL PUNCTURE,LUMBAR, INTRATHECAL CHEMO DELIVERY N/A 3/26/2020    Procedure: Lumbar puncture with intrathecal Chemotherapy (not CD);  Surgeon: Todd Mercado MD;  [facility]      SPINAL PUNCTURE,LUMBAR, INTRATHECAL CHEMO DELIVERY N/A 4/23/2020    Procedure: Lumbar puncture with intrathecal Chemotherapy (CD);  Surgeon: Marleny Brewer MD;  [facility]      SPINAL PUNCTURE,LUMBAR, INTRATHECAL CHEMO DELIVERY N/A 5/14/2020    Procedure: Lumbar puncture with intrathecal Chemotherapy (not CD);  Surgeon: Todd Mercado MD;  [facility]      SPINAL PUNCTURE,LUMBAR, INTRATHECAL CHEMO DELIVERY N/A 7/9/2020    Procedure: Lumbar puncture with intrathecal Chemotherapy (CD);  Surgeon: Todd Mercado MD;  [facility]      SPINAL PUNCTURE,LUMBAR, INTRATHECAL CHEMO DELIVERY N/A 8/6/2020    Procedure: Lumbar puncture with intrathecal Chemotherapy (not CD);  Surgeon: Todd Mercado MD;  [facility]      SPINAL PUNCTURE,LUMBAR, INTRATHECAL CHEMO DELIVERY N/A 10/6/2020    Procedure: Lumbar puncture with intrathecal Chemotherapy (not CD);  Surgeon: Félix Van, YOHAN CNP;  [facility]      THORACENTESIS N/A 8/31/2019    Procedure: Thoracentesis;  Surgeon: Michell Keith MD;  [facility] OR      Allergies   Allergen Reactions     Asparaginase Derivatives Other (See Comments)     Severe pancreatitis     No Known Drug Allergies         Anesthesia Evaluation    ROS/Med Hx    No history of anesthetic complications    Cardiovascular Findings - negative ROS    Neuro Findings -  negative ROS    Pulmonary Findings - negative ROS    HENT Findings - negative HENT ROS    Skin Findings - negative skin ROS      GI/Hepatic/Renal Findings - negative ROS    Endocrine/Metabolic Findings - negative ROS        Hematology/Oncology Findings   (+) cancer and blood dyscrasia    Additional Notes  Smokes marijuana, some UE edema          PHYSICAL EXAM:   Mental Status/Neuro: A/A/O   Airway: Facies: Feasible  Mallampati: I  Mouth/Opening: Full  TM distance: > 6 cm  Neck ROM: Full   Respiratory: Auscultation: CTAB     Resp. Rate: Normal     Resp. Effort: Normal      CV: Rhythm: Regular  Rate: Age appropriate  Heart: Normal Sounds  Edema: None   Comments:      Dental: Normal Dentition                Assessment:   ASA SCORE: 3    H&P: History and physical reviewed and following examination; no interval change.    NPO Status: NPO Appropriate     Plan:   Anes. Type:  General   Pre-Medication: None   Induction:  IV (Standard)   Airway: Native Airway   Access/Monitoring: PIV   Maintenance: Propofol Sedation     Postop Plan:   Postop Pain: None  Postop Sedation/Airway: Not planned     PONV Management:    Prevention: Ondansetron, Propofol     CONSENT: Direct conversation   Plan and risks discussed with: Patient                Yessenia Jang MD

## 2020-11-03 NOTE — LETTER
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 Lazaro presented with acute onset cough and was found to have an anterior mediastinal mass and malignant left-sided pleural effusion.  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  He was admitted to [facility] oncology service 8/30 and started on treatment per [id number].  He had a pleural effusion that required a chest tube and a pericardial effusion that was not drained. His Induction was complicated by cardiac compression secondary to his mass leading to tamponade, this improved through out his hospital stay.  He also had dysphagia that improved with treatment of his mass, swallow study was normal. His course was recently complicated by extensive bilateral UE DVTs, and he was successfully treated with anticoagulation. His consolidation course was complicated by the development of severe asparaginase induced pancreatitis. He became quite ill with SIRS and associated hypotension, he required pressor support in the ICU. As a result, asparaginase was eliminated from his treatment plan. He was in CR status at end of consolidation. During maintenance, he developed hepatotoxicity so allopurinol and dose reduced 6MP were started for correction of skewed 6-MP metabolism.  Lazaro comes to clinic today for Day 29 of his 3rd Maintenance cycle.      HPI:   Since his last visit Lazaro has been doing well. He has a normal appetite. He had one occasion of nausea but this was not persistent. He has not had any regular tripping but he reports an occasion of his leg feeling numb after positioning it abnormally for a long time playing video games and had a fall when he went to walk. This numbness resolved after 30 minutes. He has good energy.  No constipation nor  diarrhea.  Denies pain, no paresthesias.  He has not had fever.  No skin concerns.     Review of systems:  The 10 point Review of Systems is negative other than noted in the HPI or here.      PMH:   Past Medical History:   Diagnosis Date     Acute necrotizing pancreatitis 11/07/2019    attributed to asparaginase     Acute pancreatitis due to PEGaspariginase therapy  11/15/2019     DVT of upper extremity (deep vein thrombosis) (H) 09/26/2019    Bilateral      Edema of upper extremity 10/17/2019     Folliculitis 10/17/2019     Migraine 2006    have resolved     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     -- Spinal HA following LP  -- TPMT shows Intermediate activity with normal TPMT/NUDT15 genotype  -- Factor V leiden and prothrombin gene mutation negative  -- Necrotizing pancreatitis secondary to asparaginase  -- former smoke, quit with the use of nicotine patches. Does smoke marijuana daily, working on decreasing frequency     PFMH:   Family History   Problem Relation Age of Onset     No Known Problems Mother      No Known Problems Father      Asthma Brother      Thyroid Cancer Paternal Grandmother      Melanoma Paternal Aunt        Social History:   Social History     Social History Narrative    Lazaro previously lived at home with his dad and step mom in [place] but is now staying with his grandma in [place]. Biological mother is involved in his life. Has 4 step-siblings and 1 biological sibling. 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 Has completed high school. 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  He has been enjoying fishing and video games.   Smoking marijuana daily     Current Medications:  Current Outpatient Medications on File Prior to Visit   Medication Sig Dispense Refill     allopurinol (ZYLOPRIM) 100 MG tablet Take 1 tablet (100 mg) by mouth daily 30 tablet 11  "    diphenhydrAMINE (BENADRYL) 25 MG capsule Take 1-2 capsules (25-50 mg) by mouth every 6 hours as needed (Breakthrough Nausea and Vomiting ) (Patient not taking: Reported on 10/6/2020) 30 capsule 1     lidocaine-prilocaine (EMLA) 2.5-2.5 % external cream Apply topically as needed for other (prior to port access) (Patient not taking: Reported on 10/6/2020) 30 g 6     methotrexate 2.5 MG tablet Take 17 tablets (42.5mg) weekly on Tuesdays. Do not take on Days of LP. 68 tablet 2     ondansetron (ZOFRAN-ODT) 8 MG ODT tab Take 1 tablet (8 mg) by mouth every 8 hours as needed for nausea 20 tablet 6     pantoprazole (PROTONIX) 40 MG EC tablet Take 1 tablet (40 mg) by mouth every morning (before breakfast) 30 tablet 2     polyethylene glycol (MIRALAX/GLYCOLAX) packet Take 17 g by mouth daily 30 packet 0     predniSONE (DELTASONE) 10 MG tablet Take 40mg (4 tabs) twice dialy for 5 days every 4 weeks. 40 tablet 2     sennosides (SENOKOT) 8.6 MG tablet Take 2 tablets by mouth 2 times daily as needed for constipation 60 each 1     sulfamethoxazole-trimethoprim (BACTRIM DS) 800-160 MG tablet Take 1 tablet by mouth Every Mon, Tues two times daily 16 tablet 11     Vitamin D, Cholecalciferol, 25 MCG (1000 UT) TABS Take 1 tablet (1,000 Units) by mouth daily 30 tablet 3     Reviewed medications with Lazaro.  Not taking ativan or scopolamine.  Confirmed oral chemo doses, has not missed any doses. He is not taking Vitamin D currently.  Has not yet received [id number] influenza vaccine      Physical Exam:   Temp:  [98.2  F (36.8  C)] 98.2  F (36.8  C)  Pulse:  [63] 63  Resp:  [16] 16  BP: (120)/(67) 120/67  SpO2:  [99 %] 99 %  Wt Readings from Last 4 Encounters:   11/03/20 80.7 kg (177 lb 14.6 oz)   10/06/20 80.1 kg (176 lb 9.4 oz)   10/06/20 80.1 kg (176 lb 9.4 oz)   09/03/20 81.7 kg (180 lb 1.9 oz)     Ht Readings from Last 2 Encounters:   11/03/20 1.844 m (6' 0.6\")   10/06/20 1.844 m (6' 0.6\")     General: Lazaro is alert, interactive " and appropriate.  HEENT: Skull is atrauamatic and normocephalic.  Full head of hair. PERRLA, sclera are non icteric and not injected, EOM are intact. Nares are patent without drainage. Oropharynx clear, no plaques noted. Buccal mucosa and tongue clear. MMM.  Neck:  Supple without lymphadenopathy.   Lymph: There is no cervical, supraclavicular, axillary, lymphadenopathy palpated.  Cardiovascular:  HR is regular, S1, S2 no murmur.  Capillary refill is < 2 seconds.   Respiratory: No cough noted. Respirations are easy.  Lungs are clear to auscultation throughout.  No crackles or wheezes.  Gastrointestinal: BS present in all quadrants.  Abdomen is soft and flat.  No pain with palpation. No hepatosplenomegaly or masses are palpated.  Skin: Indurated nodule on thigh with inflammed hair follicle   Neurological:  CN 2-12 grossly intact. Gait is normal.  No issues with balance. Sensation intact in hands and feet.     Musculoskeletal:  Good strength and ROM in all extremities.  Strong dorsiflexion at ankles and great toes (5/5) bilaterally without any pain at the Achilles.     Labs:   Results for orders placed or performed in visit on 11/03/20   CBC with platelets differential     Status: Abnormal   Result Value Ref Range    WBC 3.2 (L) 4.0 - 11.0 10e9/L    RBC Count 3.35 (L) 4.4 - 5.9 10e12/L    Hemoglobin 11.8 (L) 13.3 - 17.7 g/dL    Hematocrit 34.2 (L) 40.0 - 53.0 %     (H) 78 - 100 fl    MCH 35.2 (H) 26.5 - 33.0 pg    MCHC 34.5 31.5 - 36.5 g/dL    RDW 14.2 10.0 - 15.0 %    Platelet Count 181 150 - 450 10e9/L    Diff Method Automated Method     % Neutrophils 75.3 %    % Lymphocytes 15.6 %    % Monocytes 6.3 %    % Eosinophils 1.9 %    % Basophils 0.6 %    % Immature Granulocytes 0.3 %    Nucleated RBCs 0 0 /100    Absolute Neutrophil 2.4 1.6 - 8.3 10e9/L    Absolute Lymphocytes 0.5 (L) 0.8 - 5.3 10e9/L    Absolute Monocytes 0.2 0.0 - 1.3 10e9/L    Absolute Eosinophils 0.1 0.0 - 0.7 10e9/L    Absolute Basophils 0.0  0.0 - 0.2 10e9/L    Abs Immature Granulocytes 0.0 0 - 0.4 10e9/L    Absolute Nucleated RBC 0.0    Comprehensive metabolic panel     Status: Abnormal   Result Value Ref Range    Sodium 143 133 - 144 mmol/L    Potassium 3.8 3.4 - 5.3 mmol/L    Chloride 110 (H) 94 - 109 mmol/L    Carbon Dioxide 28 20 - 32 mmol/L    Anion Gap 5 3 - 14 mmol/L    Glucose 93 70 - 99 mg/dL    Urea Nitrogen 15 7 - 30 mg/dL    Creatinine 0.65 (L) 0.66 - 1.25 mg/dL    GFR Estimate >90 >60 mL/min/[1.73_m2]    GFR Estimate If Black >90 >60 mL/min/[1.73_m2]    Calcium 8.5 8.5 - 10.1 mg/dL    Bilirubin Total 0.6 0.2 - 1.3 mg/dL    Albumin 4.1 3.4 - 5.0 g/dL    Protein Total 6.4 (L) 6.8 - 8.8 g/dL    Alkaline Phosphatase 68 40 - 150 U/L    ALT 21 0 - 70 U/L    AST 7 0 - 45 U/L       11/3/20 Therapeutic Lumbar Puncture  A Lumbar Puncture was performed in the Pediatric Sedation Suite. Informed consent was obtained prior to the procedure. Lazaro Lund was identified by facial recognition and ID arm band. A time-out was performed. Lazaro Lund was then placed in the left lateral decubitus position and the lumbosacral area was sterily prepped using Chloraprep followed by drape placement. Anatomic landmarks were identified by palpation. Then, a 22 gauge, 3.5 inch Slava spinal needle was easily inserted into the L3/L4 interspace. On the first attempt approximately 3 mL of clear and colorless cerebrospinal fluid was obtained to be sent to the lab for cell count analysis and cytospin. Following that, 15mg of intrathecal methotrexate in 6 mL of preservative-free normal saline was infused without resistance. The needle was removed and a Band-Aid applied. Lazaro Lund tolerated this procedure very well.  Signed,  Nicho Fischer   Pediatric Hematology/Oncology Fellow    I was present and observed the fellow during the entire procedure.    Reynaldo Campuzano MD  Pediatric Hematology/Oncology  [facility]  Pager  [phone]      Assessment:  Lazaro Lund is a 20 year old young man with T cell lymphoblastic lymphoma (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  He is being treated per [facility] protocol [id number]. He's had a CRu following Induction with a CR at the end of Consolidation. His course was complicated by extensive bilateral DVT and severe necrotizing pancreatitis requiring cessation of PEG-asparaginase from his treatment.       He comes to clinic today for Day 29 Cycle 3 of Maintenance therapy. Overall, he has been doing well. He is currently on 33% full dose of 6MP with allopurinol for correction of skewed 6-MP metabolism and 100% methotrexate. ANC is above target range on two occasions so meets criteria to increase dose of 6-MP    Plan:   1) Labs reviewed with Lazrao. Increase 6MP to 50mg (M-F) and 75mg (Sat-Sun); this represents 47% of full dose. Continue full dose PO methotrexate.  2) Start prednisone burst.  4) Plan for influenza vaccine however did not give today since Lazaro is starting steroids.  Recommend he get locally in about 3 weeks - reminded again to get this done  5) Protonix during steroid bursts.  6) Most recent Vit D level was normal however Lazaro should continue on Vit D 1000IU daily  7) Bactrim for PCP prophylaxis.  8) Continue to check CMP and TGNs monthly moving forward.  9) Discussed with Lazaro that he will need COVID testing prior to LPs moving forward.  He prefers to come the day of, have it done in sedation and then come to clinic for chemo while he is waiting for results.   10) RTC in 4 weeks for D57 of Cycle 3     Signed,  Nicho Fischer   Pediatric Hematology/Oncology Fellow    Physician Attestation    I saw and evaluated the patient. I discussed with the fellow and agree with the findings and plan as documented in the fellow's note.     Reynaldo Campuzano MD  Pediatric Hematology/Oncology  [facility]

## 2020-11-04 LAB
RBC # CSF MANUAL: 0 /UL (ref 0–2)
WBC # CSF MANUAL: 1 /UL (ref 0–5)

## 2020-11-04 NOTE — ANESTHESIA POSTPROCEDURE EVALUATION
Anesthesia POST Procedure Evaluation    Patient: Lazaro Lund   MRN:     [id number] Gender:   male   Age:    20 year old :      2000        Preoperative Diagnosis: Lymphoma (H) [C85.90]   Procedure(s):  Lumbar puncture with intrathecal Chemotherapy (not CD)   Postop Comments: No value filed.     Anesthesia Type: General       Disposition: Outpatient   Postop Pain Control: Uneventful            Sign Out: Well controlled pain   PONV: No   Neuro/Psych: Uneventful            Sign Out: Acceptable/Baseline neuro status   Airway/Respiratory: Uneventful            Sign Out: AIRWAY IN SITU/Resp. Support   CV/Hemodynamics: Uneventful            Sign Out: Acceptable CV status   Other NRE: NONE   DID A NON-ROUTINE EVENT OCCUR? No    Event details/Postop Comments:  Lazaro had prompt awakening and was discharged alert and comfortable.          Last Anesthesia Record Vitals:  CRNA VITALS  11/3/2020 1325 - 11/3/2020 1425      11/3/2020             NIBP:  91/44    Pulse:  61    NIBP Mean:  63    SpO2:  100 %    Resp Rate (observed):  19          Last PACU Vitals:  Vitals Value Taken Time   BP 96/55 20 1410   Temp 36.3  C (97.3  F) 20 1410   Pulse 60 20 1410   Resp 19 20 1410   SpO2 97 % 20 1410   Temp src     NIBP 91/44 20 1358   Pulse 61 20 1358   SpO2 100 % 20 1358   Resp     Temp     Ht Rate     Temp 2           Electronically Signed By: Yessenia Jang MD, 2020, 9:06 AM

## 2020-11-10 LAB — LAB SCANNED RESULT: ABNORMAL

## 2020-12-01 ENCOUNTER — OFFICE VISIT (OUTPATIENT)
Dept: PEDIATRIC HEMATOLOGY/ONCOLOGY | Facility: CLINIC | Age: 20
End: 2020-12-01
Attending: NURSE PRACTITIONER
Payer: COMMERCIAL

## 2020-12-01 ENCOUNTER — INFUSION THERAPY VISIT (OUTPATIENT)
Dept: INFUSION THERAPY | Facility: CLINIC | Age: 20
End: 2020-12-01
Attending: NURSE PRACTITIONER
Payer: COMMERCIAL

## 2020-12-01 VITALS
DIASTOLIC BLOOD PRESSURE: 65 MMHG | HEART RATE: 65 BPM | RESPIRATION RATE: 18 BRPM | TEMPERATURE: 98.4 F | WEIGHT: 174.16 LBS | SYSTOLIC BLOOD PRESSURE: 110 MMHG | BODY MASS INDEX: 23.08 KG/M2 | HEIGHT: 73 IN | OXYGEN SATURATION: 99 %

## 2020-12-01 DIAGNOSIS — C83.50 T LYMPHOBLASTIC LYMPHOMA (H): Primary | ICD-10-CM

## 2020-12-01 LAB
ALBUMIN SERPL-MCNC: 4.2 G/DL (ref 3.4–5)
ALP SERPL-CCNC: 65 U/L (ref 40–150)
ALT SERPL W P-5'-P-CCNC: 22 U/L (ref 0–70)
ANION GAP SERPL CALCULATED.3IONS-SCNC: 3 MMOL/L (ref 3–14)
AST SERPL W P-5'-P-CCNC: 7 U/L (ref 0–45)
BASOPHILS # BLD AUTO: 0 10E9/L (ref 0–0.2)
BASOPHILS NFR BLD AUTO: 0.6 %
BILIRUB SERPL-MCNC: 0.6 MG/DL (ref 0.2–1.3)
BUN SERPL-MCNC: 15 MG/DL (ref 7–30)
CALCIUM SERPL-MCNC: 8.7 MG/DL (ref 8.5–10.1)
CHLORIDE SERPL-SCNC: 109 MMOL/L (ref 94–109)
CO2 SERPL-SCNC: 30 MMOL/L (ref 20–32)
CREAT SERPL-MCNC: 0.62 MG/DL (ref 0.66–1.25)
DIFFERENTIAL METHOD BLD: ABNORMAL
EOSINOPHIL # BLD AUTO: 0.1 10E9/L (ref 0–0.7)
EOSINOPHIL NFR BLD AUTO: 3.9 %
ERYTHROCYTE [DISTWIDTH] IN BLOOD BY AUTOMATED COUNT: 14.1 % (ref 10–15)
GFR SERPL CREATININE-BSD FRML MDRD: >90 ML/MIN/{1.73_M2}
GLUCOSE SERPL-MCNC: 92 MG/DL (ref 70–99)
HCT VFR BLD AUTO: 34.7 % (ref 40–53)
HGB BLD-MCNC: 12.1 G/DL (ref 13.3–17.7)
IMM GRANULOCYTES # BLD: 0 10E9/L (ref 0–0.4)
IMM GRANULOCYTES NFR BLD: 0.6 %
LYMPHOCYTES # BLD AUTO: 0.4 10E9/L (ref 0.8–5.3)
LYMPHOCYTES NFR BLD AUTO: 21.9 %
MCH RBC QN AUTO: 36.1 PG (ref 26.5–33)
MCHC RBC AUTO-ENTMCNC: 34.9 G/DL (ref 31.5–36.5)
MCV RBC AUTO: 104 FL (ref 78–100)
MISCELLANEOUS TEST: NORMAL
MONOCYTES # BLD AUTO: 0.2 10E9/L (ref 0–1.3)
MONOCYTES NFR BLD AUTO: 9 %
NEUTROPHILS # BLD AUTO: 1.1 10E9/L (ref 1.6–8.3)
NEUTROPHILS NFR BLD AUTO: 64 %
NRBC # BLD AUTO: 0 10*3/UL
NRBC BLD AUTO-RTO: 0 /100
PLATELET # BLD AUTO: 193 10E9/L (ref 150–450)
POTASSIUM SERPL-SCNC: 3.9 MMOL/L (ref 3.4–5.3)
PROT SERPL-MCNC: 6.3 G/DL (ref 6.8–8.8)
RBC # BLD AUTO: 3.35 10E12/L (ref 4.4–5.9)
SARS-COV-2 RNA SPEC QL NAA+PROBE: NOT DETECTED
SODIUM SERPL-SCNC: 142 MMOL/L (ref 133–144)
SPECIMEN SOURCE: NORMAL
WBC # BLD AUTO: 1.8 10E9/L (ref 4–11)

## 2020-12-01 PROCEDURE — U0003 INFECTIOUS AGENT DETECTION BY NUCLEIC ACID (DNA OR RNA); SEVERE ACUTE RESPIRATORY SYNDROME CORONAVIRUS 2 (SARS-COV-2) (CORONAVIRUS DISEASE [COVID-19]), AMPLIFIED PROBE TECHNIQUE, MAKING USE OF HIGH THROUGHPUT TECHNOLOGIES AS DESCRIBED BY CMS-2020-01-R: HCPCS | Performed by: NURSE PRACTITIONER

## 2020-12-01 PROCEDURE — 250N000011 HC RX IP 250 OP 636

## 2020-12-01 PROCEDURE — 258N000003 HC RX IP 258 OP 636: Performed by: NURSE PRACTITIONER

## 2020-12-01 PROCEDURE — 96409 CHEMO IV PUSH SNGL DRUG: CPT

## 2020-12-01 PROCEDURE — 84999 UNLISTED CHEMISTRY PROCEDURE: CPT | Performed by: NURSE PRACTITIONER

## 2020-12-01 PROCEDURE — 80299 QUANTITATIVE ASSAY DRUG: CPT | Performed by: NURSE PRACTITIONER

## 2020-12-01 PROCEDURE — 250N000011 HC RX IP 250 OP 636: Performed by: NURSE PRACTITIONER

## 2020-12-01 PROCEDURE — 80053 COMPREHEN METABOLIC PANEL: CPT | Performed by: NURSE PRACTITIONER

## 2020-12-01 PROCEDURE — 85025 COMPLETE CBC W/AUTO DIFF WBC: CPT | Performed by: NURSE PRACTITIONER

## 2020-12-01 PROCEDURE — 99214 OFFICE O/P EST MOD 30 MIN: CPT | Performed by: NURSE PRACTITIONER

## 2020-12-01 RX ORDER — HEPARIN SODIUM (PORCINE) LOCK FLUSH IV SOLN 100 UNIT/ML 100 UNIT/ML
5 SOLUTION INTRAVENOUS
Status: DISCONTINUED | OUTPATIENT
Start: 2020-12-01 | End: 2020-12-01 | Stop reason: HOSPADM

## 2020-12-01 RX ORDER — HEPARIN SODIUM (PORCINE) LOCK FLUSH IV SOLN 100 UNIT/ML 100 UNIT/ML
SOLUTION INTRAVENOUS
Status: COMPLETED
Start: 2020-12-01 | End: 2020-12-01

## 2020-12-01 RX ADMIN — VINCRISTINE SULFATE 2 MG: 1 INJECTION, SOLUTION INTRAVENOUS at 11:24

## 2020-12-01 RX ADMIN — HEPARIN SODIUM (PORCINE) LOCK FLUSH IV SOLN 100 UNIT/ML 5 ML: 100 SOLUTION at 12:15

## 2020-12-01 RX ADMIN — SODIUM CHLORIDE 20 ML: 9 INJECTION, SOLUTION INTRAVENOUS at 11:30

## 2020-12-01 RX ADMIN — HEPARIN 5 ML: 100 SYRINGE at 12:15

## 2020-12-01 ASSESSMENT — MIFFLIN-ST. JEOR: SCORE: 1838.75

## 2020-12-01 NOTE — PROGRESS NOTES
Infusion Nursing Note    Lazaro Lund Presents to [facility] today for: C3D57 Vincristine    Due to : T lymphoblastic lymphoma (H)    Intravenous Access/Labs: Port accessed using sterile technique without issue. Blood return noted. Labs drawn as ordered.    Coping:   Child Family Life declined    Infusion Note: Patient denies any fevers and/or recent illness. Grandmother currently has COVID but patient is asymptomatic. Patient denies any constipation or numbness/tingling. Vincristine given into port by gravity with blood return noted pre/mid/post infusion. Port flushed, heparin locked, and de-accessed following infusion. Patient seen and assessed by Luana Van NP while in clinic today. COVID swab collected. Stable patient left clinic when appointment complete.    Discharge Plan:   Patient verbalized understanding of discharge instructions.

## 2020-12-01 NOTE — LETTER
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 Lazaro presented with acute onset cough and was found to have an anterior mediastinal mass and malignant left-sided pleural effusion.  A CT guided biopsy was obtained at [facility] and pathology was consistent with T-cell leukemia vs lymphoma.  He was admitted to [facility] oncology service 8/30 and started on treatment per [id number].  He had a pleural effusion that required a chest tube and a pericardial effusion that was not drained. His Induction was complicated by cardiac compression secondary to his mass leading to tamponade, this improved through out his hospital stay.  He also had dysphagia that improved with treatment of his mass, swallow study was normal. His course was recently complicated by extensive bilateral UE DVTs, and he was successfully treated with anticoagulation. His consolidation course was complicated by the development of severe asparaginase induced pancreatitis. He became quite ill with SIRS and associated hypotension, he required pressor support in the ICU. As a result, asparaginase was eliminated from his treatment plan. He was in CR status at end of consolidation. During maintenance, he developed hepatotoxicity so allopurinol and dose reduced 6MP were started for correction of skewed 6-MP metabolism.  Lazaro comes to clinic today for Day 57 of his 3rd Maintenance cycle.      HPI:   Since his last visit Lazaro has been doing well.  Unfortunately his grandma, who he lives with, was diagnosed with COVID on Thanksgiving.  She has been doing well, her main symptoms are fatigue and loss of smell.  Lazaro has not had any symptoms.  He is wondering if he should be tested and if he can keep going to work.    Lazaro's energy level has been good.  Appetite strong, no nausea.  No GI upset or  constipation.  He notes an ingrown hair on his right inner thigh which was angry looking a few days ago, he wonders if this is anything to be concerned about.  No other skin concerns.  No pain.  No paresthesias.  He has not had fever.     Review of systems:  The 10 point Review of Systems is negative other than noted in the HPI or here.      PMH:   Past Medical History:   Diagnosis Date     Acute necrotizing pancreatitis 11/07/2019    attributed to asparaginase     Acute pancreatitis due to PEGaspariginase therapy  11/15/2019     DVT of upper extremity (deep vein thrombosis) (H) 09/26/2019    Bilateral      Edema of upper extremity 10/17/2019     Folliculitis 10/17/2019     Migraine 2006    have resolved     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     -- Spinal HA following LP  -- TPMT shows Intermediate activity with normal TPMT/NUDT15 genotype  -- Factor V leiden and prothrombin gene mutation negative  -- Necrotizing pancreatitis secondary to asparaginase  -- former smoke, quit with the use of nicotine patches. Does smoke marijuana daily, working on decreasing frequency     PFMH:   Family History   Problem Relation Age of Onset     No Known Problems Mother      No Known Problems Father      Asthma Brother      Thyroid Cancer Paternal Grandmother      Melanoma Paternal Aunt        Social History:   Social History     Social History Narrative    Lazaro previously lived at home with his dad and step mom in [place] but is now staying with his grandma in [place]. Biological mother is involved in his life. Has 4 step-siblings and 1 biological sibling. 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 Has completed high school. 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  He has been enjoying fishing and video games.   Smoking marijuana daily     Current Medications:  Current Outpatient  Medications on File Prior to Visit   Medication Sig Dispense Refill     allopurinol (ZYLOPRIM) 100 MG tablet Take 1 tablet (100 mg) by mouth daily 30 tablet 11     diphenhydrAMINE (BENADRYL) 25 MG capsule Take 1-2 capsules (25-50 mg) by mouth every 6 hours as needed (Breakthrough Nausea and Vomiting ) (Patient not taking: Reported on 10/6/2020) 30 capsule 1     lidocaine-prilocaine (EMLA) 2.5-2.5 % external cream Apply topically as needed for other (prior to port access) 30 g 6     mercaptopurine (PURINETHOL) 50 MG tablet Take 1 tablet Monday - Friday; Take 1.5 tablets Sat and Sun 32 tablet 2     methotrexate 2.5 MG tablet Take 17 tablets (42.5mg) weekly on Tuesdays. Do not take on Days of LP. 68 tablet 2     ondansetron (ZOFRAN-ODT) 8 MG ODT tab Take 1 tablet (8 mg) by mouth every 8 hours as needed for nausea 20 tablet 6     pantoprazole (PROTONIX) 40 MG EC tablet Take 1 tablet (40 mg) by mouth every morning (before breakfast) 30 tablet 2     polyethylene glycol (MIRALAX/GLYCOLAX) packet Take 17 g by mouth daily 30 packet 0     predniSONE (DELTASONE) 10 MG tablet Take 40mg (4 tabs) twice dialy for 5 days every 4 weeks. 40 tablet 2     sennosides (SENOKOT) 8.6 MG tablet Take 2 tablets by mouth 2 times daily as needed for constipation 60 each 1     sulfamethoxazole-trimethoprim (BACTRIM DS) 800-160 MG tablet Take 1 tablet by mouth Every Mon, Tues two times daily 16 tablet 11     Vitamin D, Cholecalciferol, 25 MCG (1000 UT) TABS Take 1 tablet (1,000 Units) by mouth daily 30 tablet 3     Reviewed medications with Lazaro.  Not taking ativan or scopolamine.  Confirmed oral chemo doses, has not missed any doses. He is not taking Vitamin D currently.  Has not yet received [id number] influenza vaccine      Physical Exam:   Temp:  [98.4  F (36.9  C)] 98.4  F (36.9  C)  Pulse:  [65] 65  Resp:  [18] 18  BP: (110)/(65) 110/65  SpO2:  [99 %] 99 %  Wt Readings from Last 4 Encounters:   12/01/20 79 kg (174 lb 2.6 oz)   11/03/20  "80.7 kg (177 lb 14.6 oz)   10/06/20 80.1 kg (176 lb 9.4 oz)   10/06/20 80.1 kg (176 lb 9.4 oz)     Ht Readings from Last 2 Encounters:   12/01/20 1.846 m (6' 0.68\")   11/03/20 1.844 m (6' 0.6\")     General: Lazaro is alert, interactive and appropriate.  HEENT: Skull is atrauamatic and normocephalic.  Full head of hair. PERRLA, sclera are non icteric and not injected, EOM are intact. Nares are patent without drainage. Oropharynx clear, no plaques noted. Buccal mucosa and tongue clear. MMM.  Neck:  Supple without lymphadenopathy.   Lymph: There is no cervical, supraclavicular, axillary, lymphadenopathy palpated.  Cardiovascular:  HR is regular, S1, S2 no murmur.  Capillary refill is < 2 seconds.   Respiratory: No cough noted. Respirations are easy.  Lungs are clear to auscultation throughout.  No crackles or wheezes.  Gastrointestinal: BS present in all quadrants.  Abdomen is soft and flat.  No pain with palpation. No hepatosplenomegaly or masses are palpated.  Skin: Firm nodule on right thigh, no redness or drainage.  Neurological:  CN 2-12 grossly intact. Gait is normal.  No issues with balance. Sensation intact in hands and feet.     Musculoskeletal:  Good strength and ROM in all extremities.  Strong dorsiflexion at ankles and great toes (5/5) bilaterally without any pain at the Achilles.     Labs:   Results for orders placed or performed in visit on 12/01/20   CBC with platelets differential     Status: Abnormal   Result Value Ref Range    WBC 1.8 (L) 4.0 - 11.0 10e9/L    RBC Count 3.35 (L) 4.4 - 5.9 10e12/L    Hemoglobin 12.1 (L) 13.3 - 17.7 g/dL    Hematocrit 34.7 (L) 40.0 - 53.0 %     (H) 78 - 100 fl    MCH 36.1 (H) 26.5 - 33.0 pg    MCHC 34.9 31.5 - 36.5 g/dL    RDW 14.1 10.0 - 15.0 %    Platelet Count 193 150 - 450 10e9/L    Diff Method Automated Method     % Neutrophils 64.0 %    % Lymphocytes 21.9 %    % Monocytes 9.0 %    % Eosinophils 3.9 %    % Basophils 0.6 %    % Immature Granulocytes 0.6 %    " Nucleated RBCs 0 0 /100    Absolute Neutrophil 1.1 (L) 1.6 - 8.3 10e9/L    Absolute Lymphocytes 0.4 (L) 0.8 - 5.3 10e9/L    Absolute Monocytes 0.2 0.0 - 1.3 10e9/L    Absolute Eosinophils 0.1 0.0 - 0.7 10e9/L    Absolute Basophils 0.0 0.0 - 0.2 10e9/L    Abs Immature Granulocytes 0.0 0 - 0.4 10e9/L    Absolute Nucleated RBC 0.0    Comprehensive metabolic panel     Status: Abnormal   Result Value Ref Range    Sodium 142 133 - 144 mmol/L    Potassium 3.9 3.4 - 5.3 mmol/L    Chloride 109 94 - 109 mmol/L    Carbon Dioxide 30 20 - 32 mmol/L    Anion Gap 3 3 - 14 mmol/L    Glucose 92 70 - 99 mg/dL    Urea Nitrogen 15 7 - 30 mg/dL    Creatinine 0.62 (L) 0.66 - 1.25 mg/dL    GFR Estimate >90 >60 mL/min/[1.73_m2]    GFR Estimate If Black >90 >60 mL/min/[1.73_m2]    Calcium 8.7 8.5 - 10.1 mg/dL    Bilirubin Total 0.6 0.2 - 1.3 mg/dL    Albumin 4.2 3.4 - 5.0 g/dL    Protein Total 6.3 (L) 6.8 - 8.8 g/dL    Alkaline Phosphatase 65 40 - 150 U/L    ALT 22 0 - 70 U/L    AST 7 0 - 45 U/L     Assessment:  Lazaro Lund is a 20 year old young man with T cell lymphoblastic lymphoma (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  He is being treated per COG protocol [id number]. He's had a CRu following Induction with a CR at the end of Consolidation. His course was complicated by extensive bilateral DVT and severe necrotizing pancreatitis requiring cessation of PEG-asparaginase from his treatment.  He comes to clinic today for Day 57 Cycle 3 of Maintenance therapy. Overall, he has been doing well. He is currently on 47% full dose of 6MP with allopurinol for correction of skewed 6-MP metabolism and 100% methotrexate. ANC is in target range today.  He had a COVID exposure at home.  Recent ingrown hair, resolving.     Plan:   1) Labs reviewed with Lazaro.  Continue current doses of oral chemo.  2) Start prednisone burst.  3) COVID test in clinic today, reviewed need to isolate until at least 14 days from his exposure before he should return to work.  Discussed  s/sx to monitor for and reasons to call.  I will call Lazaro when his COVID test is resulted.  4) Plan for influenza vaccine however did not give today since Lazaro is starting steroids.  Scheduled for 12/22 in clinic, reviewed time with Lazaro and this works for him.  5) Protonix during steroid bursts.  6) Most recent Vit D level was normal however Lazaro should continue on Vit D 1000IU daily  7) Bactrim for PCP prophylaxis.  8) Continue to check CMP and TGNs monthly moving forward.  9) Discussed with Lazaro that he will need COVID testing prior to LPs moving forward.  He prefers to come the day of, have it done in sedation and then come to clinic for chemo while he is waiting for results.   10) RTC in 4 weeks for D1 of Cycle 4     Félix Van CNP    Addendum: COVID test is negative      YOHAN Dunn CNP

## 2020-12-01 NOTE — PROGRESS NOTES
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 Lazaro presented with acute onset cough and was found to have an anterior mediastinal mass and malignant left-sided pleural effusion.  A CT guided biopsy was obtained at [facility] and pathology was consistent with T-cell leukemia vs lymphoma.  He was admitted to [facility] oncology service 8/30 and started on treatment per [id number].  He had a pleural effusion that required a chest tube and a pericardial effusion that was not drained. His Induction was complicated by cardiac compression secondary to his mass leading to tamponade, this improved through out his hospital stay.  He also had dysphagia that improved with treatment of his mass, swallow study was normal. His course was recently complicated by extensive bilateral UE DVTs, and he was successfully treated with anticoagulation. His consolidation course was complicated by the development of severe asparaginase induced pancreatitis. He became quite ill with SIRS and associated hypotension, he required pressor support in the ICU. As a result, asparaginase was eliminated from his treatment plan. He was in CR status at end of consolidation. During maintenance, he developed hepatotoxicity so allopurinol and dose reduced 6MP were started for correction of skewed 6-MP metabolism.  Lazaro comes to clinic today for Day 57 of his 3rd Maintenance cycle.      HPI:   Since his last visit Lazaro has been doing well.  Unfortunately his grandma, who he lives with, was diagnosed with COVID on Thanksgiving.  She has been doing well, her main symptoms are fatigue and loss of smell.  Lazaro has not had any symptoms.  He is wondering if he should be tested and if he can keep going to work.    Lazaro's energy level has been good.  Appetite strong, no nausea.  No GI upset or constipation.  He notes an ingrown hair on his right inner thigh which was angry looking a  few days ago, he wonders if this is anything to be concerned about.  No other skin concerns.  No pain.  No paresthesias.  He has not had fever.     Review of systems:  The 10 point Review of Systems is negative other than noted in the HPI or here.      PMH:   Past Medical History:   Diagnosis Date     Acute necrotizing pancreatitis 11/07/2019    attributed to asparaginase     Acute pancreatitis due to PEGaspariginase therapy  11/15/2019     DVT of upper extremity (deep vein thrombosis) (H) 09/26/2019    Bilateral      Edema of upper extremity 10/17/2019     Folliculitis 10/17/2019     Migraine 2006    have resolved     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     -- Spinal HA following LP  -- TPMT shows Intermediate activity with normal TPMT/NUDT15 genotype  -- Factor V leiden and prothrombin gene mutation negative  -- Necrotizing pancreatitis secondary to asparaginase  -- former smoke, quit with the use of nicotine patches. Does smoke marijuana daily, working on decreasing frequency     PFMH:   Family History   Problem Relation Age of Onset     No Known Problems Mother      No Known Problems Father      Asthma Brother      Thyroid Cancer Paternal Grandmother      Melanoma Paternal Aunt        Social History:   Social History     Social History Narrative    Lazaro previously lived at home with his dad and step mom in [place] but is now staying with his grandma in [place]. Biological mother is involved in his life. Has 4 step-siblings and 1 biological sibling. 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 Has completed high school. 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  He has been enjoying fishing and video games.   Smoking marijuana daily     Current Medications:  Current Outpatient Medications on File Prior to Visit   Medication Sig Dispense Refill     allopurinol (ZYLOPRIM) 100 MG  tablet Take 1 tablet (100 mg) by mouth daily 30 tablet 11     diphenhydrAMINE (BENADRYL) 25 MG capsule Take 1-2 capsules (25-50 mg) by mouth every 6 hours as needed (Breakthrough Nausea and Vomiting ) (Patient not taking: Reported on 10/6/2020) 30 capsule 1     lidocaine-prilocaine (EMLA) 2.5-2.5 % external cream Apply topically as needed for other (prior to port access) 30 g 6     mercaptopurine (PURINETHOL) 50 MG tablet Take 1 tablet Monday - Friday; Take 1.5 tablets Sat and Sun 32 tablet 2     methotrexate 2.5 MG tablet Take 17 tablets (42.5mg) weekly on Tuesdays. Do not take on Days of LP. 68 tablet 2     ondansetron (ZOFRAN-ODT) 8 MG ODT tab Take 1 tablet (8 mg) by mouth every 8 hours as needed for nausea 20 tablet 6     pantoprazole (PROTONIX) 40 MG EC tablet Take 1 tablet (40 mg) by mouth every morning (before breakfast) 30 tablet 2     polyethylene glycol (MIRALAX/GLYCOLAX) packet Take 17 g by mouth daily 30 packet 0     predniSONE (DELTASONE) 10 MG tablet Take 40mg (4 tabs) twice dialy for 5 days every 4 weeks. 40 tablet 2     sennosides (SENOKOT) 8.6 MG tablet Take 2 tablets by mouth 2 times daily as needed for constipation 60 each 1     sulfamethoxazole-trimethoprim (BACTRIM DS) 800-160 MG tablet Take 1 tablet by mouth Every Mon, Tues two times daily 16 tablet 11     Vitamin D, Cholecalciferol, 25 MCG (1000 UT) TABS Take 1 tablet (1,000 Units) by mouth daily 30 tablet 3     Reviewed medications with Lazaro.  Not taking ativan or scopolamine.  Confirmed oral chemo doses, has not missed any doses. He is not taking Vitamin D currently.  Has not yet received [id number] influenza vaccine      Physical Exam:   Temp:  [98.4  F (36.9  C)] 98.4  F (36.9  C)  Pulse:  [65] 65  Resp:  [18] 18  BP: (110)/(65) 110/65  SpO2:  [99 %] 99 %  Wt Readings from Last 4 Encounters:   12/01/20 79 kg (174 lb 2.6 oz)   11/03/20 80.7 kg (177 lb 14.6 oz)   10/06/20 80.1 kg (176 lb 9.4 oz)   10/06/20 80.1 kg (176 lb 9.4 oz)     Ht  "Readings from Last 2 Encounters:   12/01/20 1.846 m (6' 0.68\")   11/03/20 1.844 m (6' 0.6\")     General: Lazaro is alert, interactive and appropriate.  HEENT: Skull is atrauamatic and normocephalic.  Full head of hair. PERRLA, sclera are non icteric and not injected, EOM are intact. Nares are patent without drainage. Oropharynx clear, no plaques noted. Buccal mucosa and tongue clear. MMM.  Neck:  Supple without lymphadenopathy.   Lymph: There is no cervical, supraclavicular, axillary, lymphadenopathy palpated.  Cardiovascular:  HR is regular, S1, S2 no murmur.  Capillary refill is < 2 seconds.   Respiratory: No cough noted. Respirations are easy.  Lungs are clear to auscultation throughout.  No crackles or wheezes.  Gastrointestinal: BS present in all quadrants.  Abdomen is soft and flat.  No pain with palpation. No hepatosplenomegaly or masses are palpated.  Skin: Firm nodule on right thigh, no redness or drainage.  Neurological:  CN 2-12 grossly intact. Gait is normal.  No issues with balance. Sensation intact in hands and feet.     Musculoskeletal:  Good strength and ROM in all extremities.  Strong dorsiflexion at ankles and great toes (5/5) bilaterally without any pain at the Achilles.     Labs:   Results for orders placed or performed in visit on 12/01/20   CBC with platelets differential     Status: Abnormal   Result Value Ref Range    WBC 1.8 (L) 4.0 - 11.0 10e9/L    RBC Count 3.35 (L) 4.4 - 5.9 10e12/L    Hemoglobin 12.1 (L) 13.3 - 17.7 g/dL    Hematocrit 34.7 (L) 40.0 - 53.0 %     (H) 78 - 100 fl    MCH 36.1 (H) 26.5 - 33.0 pg    MCHC 34.9 31.5 - 36.5 g/dL    RDW 14.1 10.0 - 15.0 %    Platelet Count 193 150 - 450 10e9/L    Diff Method Automated Method     % Neutrophils 64.0 %    % Lymphocytes 21.9 %    % Monocytes 9.0 %    % Eosinophils 3.9 %    % Basophils 0.6 %    % Immature Granulocytes 0.6 %    Nucleated RBCs 0 0 /100    Absolute Neutrophil 1.1 (L) 1.6 - 8.3 10e9/L    Absolute Lymphocytes 0.4 (L) " 0.8 - 5.3 10e9/L    Absolute Monocytes 0.2 0.0 - 1.3 10e9/L    Absolute Eosinophils 0.1 0.0 - 0.7 10e9/L    Absolute Basophils 0.0 0.0 - 0.2 10e9/L    Abs Immature Granulocytes 0.0 0 - 0.4 10e9/L    Absolute Nucleated RBC 0.0    Comprehensive metabolic panel     Status: Abnormal   Result Value Ref Range    Sodium 142 133 - 144 mmol/L    Potassium 3.9 3.4 - 5.3 mmol/L    Chloride 109 94 - 109 mmol/L    Carbon Dioxide 30 20 - 32 mmol/L    Anion Gap 3 3 - 14 mmol/L    Glucose 92 70 - 99 mg/dL    Urea Nitrogen 15 7 - 30 mg/dL    Creatinine 0.62 (L) 0.66 - 1.25 mg/dL    GFR Estimate >90 >60 mL/min/[1.73_m2]    GFR Estimate If Black >90 >60 mL/min/[1.73_m2]    Calcium 8.7 8.5 - 10.1 mg/dL    Bilirubin Total 0.6 0.2 - 1.3 mg/dL    Albumin 4.2 3.4 - 5.0 g/dL    Protein Total 6.3 (L) 6.8 - 8.8 g/dL    Alkaline Phosphatase 65 40 - 150 U/L    ALT 22 0 - 70 U/L    AST 7 0 - 45 U/L     Assessment:  Lazaro Lund is a 20 year old young man with T cell lymphoblastic lymphoma (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  He is being treated per COG protocol [id number]. He's had a CRu following Induction with a CR at the end of Consolidation. His course was complicated by extensive bilateral DVT and severe necrotizing pancreatitis requiring cessation of PEG-asparaginase from his treatment.  He comes to clinic today for Day 57 Cycle 3 of Maintenance therapy. Overall, he has been doing well. He is currently on 37.5% full dose of 6MP with allopurinol for correction of skewed 6-MP metabolism and 100% methotrexate. ANC is in target range today.  He had a COVID exposure at home.  Recent ingrown hair, resolving.     Plan:   1) Labs reviewed with Lazaro.  Continue current doses of oral chemo.  2) Start prednisone burst.  3) COVID test in clinic today, reviewed need to isolate until at least 14 days from his exposure before he should return to work.  Discussed s/sx to monitor for and reasons to call.  I will call Lazaro when his COVID test is resulted.  4)  Plan for influenza vaccine however did not give today since Lazaro is starting steroids.  Scheduled for 12/22 in clinic, reviewed time with Lazaro and this works for him.  5) Protonix during steroid bursts.  6) Most recent Vit D level was normal however Lazaro should continue on Vit D 1000IU daily  7) Bactrim for PCP prophylaxis.  8) Continue to check CMP and TGNs monthly moving forward.  9) Discussed with Lazaro that he will need COVID testing prior to LPs moving forward.  He prefers to come the day of, have it done in sedation and then come to clinic for chemo while he is waiting for results.   10) RTC in 4 weeks for D1 of Cycle 4     Félix Van, CNP    Addendum: COVID test is negative

## 2020-12-08 LAB — LAB SCANNED RESULT: ABNORMAL

## 2020-12-10 RX ORDER — ALBUTEROL SULFATE 0.83 MG/ML
2.5 SOLUTION RESPIRATORY (INHALATION)
Status: CANCELLED | OUTPATIENT
Start: 2021-02-23

## 2020-12-10 RX ORDER — ALBUTEROL SULFATE 90 UG/1
1-2 AEROSOL, METERED RESPIRATORY (INHALATION)
Status: CANCELLED
Start: 2021-02-23

## 2020-12-10 RX ORDER — ALBUTEROL SULFATE 0.83 MG/ML
2.5 SOLUTION RESPIRATORY (INHALATION)
Status: CANCELLED | OUTPATIENT
Start: 2021-01-26

## 2020-12-10 RX ORDER — METHYLPREDNISOLONE SODIUM SUCCINATE 125 MG/2ML
125 INJECTION, POWDER, LYOPHILIZED, FOR SOLUTION INTRAMUSCULAR; INTRAVENOUS
Status: CANCELLED | OUTPATIENT
Start: 2021-02-23

## 2020-12-10 RX ORDER — ALBUTEROL SULFATE 90 UG/1
1-2 AEROSOL, METERED RESPIRATORY (INHALATION)
Status: CANCELLED
Start: 2020-12-29

## 2020-12-10 RX ORDER — EPINEPHRINE 1 MG/ML
0.3 INJECTION, SOLUTION, CONCENTRATE INTRAVENOUS EVERY 5 MIN PRN
Status: CANCELLED | OUTPATIENT
Start: 2020-12-29

## 2020-12-10 RX ORDER — EPINEPHRINE 1 MG/ML
0.3 INJECTION, SOLUTION, CONCENTRATE INTRAVENOUS EVERY 5 MIN PRN
Status: CANCELLED | OUTPATIENT
Start: 2021-01-26

## 2020-12-10 RX ORDER — DIPHENHYDRAMINE HYDROCHLORIDE 50 MG/ML
50 INJECTION INTRAMUSCULAR; INTRAVENOUS
Status: CANCELLED
Start: 2021-01-26

## 2020-12-10 RX ORDER — METHYLPREDNISOLONE SODIUM SUCCINATE 125 MG/2ML
125 INJECTION, POWDER, LYOPHILIZED, FOR SOLUTION INTRAMUSCULAR; INTRAVENOUS
Status: CANCELLED | OUTPATIENT
Start: 2021-01-26

## 2020-12-10 RX ORDER — SODIUM CHLORIDE 9 MG/ML
200 INJECTION, SOLUTION INTRAVENOUS CONTINUOUS PRN
Status: CANCELLED | OUTPATIENT
Start: 2021-02-23

## 2020-12-10 RX ORDER — SODIUM CHLORIDE 9 MG/ML
200 INJECTION, SOLUTION INTRAVENOUS CONTINUOUS PRN
Status: CANCELLED | OUTPATIENT
Start: 2020-12-29

## 2020-12-10 RX ORDER — SODIUM CHLORIDE 9 MG/ML
200 INJECTION, SOLUTION INTRAVENOUS CONTINUOUS PRN
Status: CANCELLED | OUTPATIENT
Start: 2021-01-26

## 2020-12-10 RX ORDER — METHYLPREDNISOLONE SODIUM SUCCINATE 125 MG/2ML
125 INJECTION, POWDER, LYOPHILIZED, FOR SOLUTION INTRAMUSCULAR; INTRAVENOUS
Status: CANCELLED | OUTPATIENT
Start: 2020-12-29

## 2020-12-10 RX ORDER — ALBUTEROL SULFATE 90 UG/1
1-2 AEROSOL, METERED RESPIRATORY (INHALATION)
Status: CANCELLED
Start: 2021-01-26

## 2020-12-10 RX ORDER — ALBUTEROL SULFATE 0.83 MG/ML
2.5 SOLUTION RESPIRATORY (INHALATION)
Status: CANCELLED | OUTPATIENT
Start: 2020-12-29

## 2020-12-10 RX ORDER — DIPHENHYDRAMINE HYDROCHLORIDE 50 MG/ML
50 INJECTION INTRAMUSCULAR; INTRAVENOUS
Status: CANCELLED
Start: 2020-12-29

## 2020-12-10 RX ORDER — EPINEPHRINE 1 MG/ML
0.3 INJECTION, SOLUTION, CONCENTRATE INTRAVENOUS EVERY 5 MIN PRN
Status: CANCELLED | OUTPATIENT
Start: 2021-02-23

## 2020-12-10 RX ORDER — DIPHENHYDRAMINE HYDROCHLORIDE 50 MG/ML
50 INJECTION INTRAMUSCULAR; INTRAVENOUS
Status: CANCELLED
Start: 2021-02-23

## 2020-12-22 ENCOUNTER — ALLIED HEALTH/NURSE VISIT (OUTPATIENT)
Dept: TRANSPLANT | Facility: CLINIC | Age: 20
End: 2020-12-22
Attending: NURSE PRACTITIONER
Payer: COMMERCIAL

## 2020-12-22 DIAGNOSIS — C83.50 T LYMPHOBLASTIC LYMPHOMA (H): Primary | ICD-10-CM

## 2020-12-22 PROCEDURE — 99207 PR NO CHARGE NURSE ONLY: CPT

## 2020-12-22 PROCEDURE — 250N000011 HC RX IP 250 OP 636: Performed by: NURSE PRACTITIONER

## 2020-12-22 PROCEDURE — G0008 ADMIN INFLUENZA VIRUS VAC: HCPCS | Performed by: NURSE PRACTITIONER

## 2020-12-22 PROCEDURE — G0008 ADMIN INFLUENZA VIRUS VAC: HCPCS

## 2020-12-22 PROCEDURE — 90686 IIV4 VACC NO PRSV 0.5 ML IM: CPT | Performed by: NURSE PRACTITIONER

## 2020-12-22 RX ADMIN — INFLUENZA A VIRUS A/GUANGDONG-MAONAN/SWL1536/2019 CNIC-1909 (H1N1) ANTIGEN (FORMALDEHYDE INACTIVATED), INFLUENZA A VIRUS A/HONG KONG/2671/2019 (H3N2) ANTIGEN (FORMALDEHYDE INACTIVATED), INFLUENZA B VIRUS B/PHUKET/3073/2013 ANTIGEN (FORMALDEHYDE INACTIVATED), AND INFLUENZA B VIRUS B/WASHINGTON/02/2019 ANTIGEN (FORMALDEHYDE INACTIVATED) 0.5 ML: 15; 15; 15; 15 INJECTION, SUSPENSION INTRAMUSCULAR at 16:21

## 2020-12-28 ENCOUNTER — TELEPHONE (OUTPATIENT)
Dept: PEDIATRIC HEMATOLOGY/ONCOLOGY | Facility: CLINIC | Age: 20
End: 2020-12-28

## 2020-12-28 ENCOUNTER — ANESTHESIA EVENT (OUTPATIENT)
Dept: PEDIATRICS | Facility: CLINIC | Age: 20
End: 2020-12-28
Payer: COMMERCIAL

## 2020-12-29 ENCOUNTER — ANESTHESIA (OUTPATIENT)
Dept: PEDIATRICS | Facility: CLINIC | Age: 20
End: 2020-12-29
Payer: COMMERCIAL

## 2020-12-29 ENCOUNTER — OFFICE VISIT (OUTPATIENT)
Dept: PEDIATRIC HEMATOLOGY/ONCOLOGY | Facility: CLINIC | Age: 20
End: 2020-12-29
Attending: PEDIATRICS
Payer: COMMERCIAL

## 2020-12-29 ENCOUNTER — HOSPITAL ENCOUNTER (OUTPATIENT)
Facility: CLINIC | Age: 20
Discharge: HOME | End: 2020-12-29
Attending: PEDIATRICS | Admitting: PEDIATRICS
Payer: COMMERCIAL

## 2020-12-29 ENCOUNTER — INFUSION THERAPY VISIT (OUTPATIENT)
Dept: INFUSION THERAPY | Facility: CLINIC | Age: 20
End: 2020-12-29
Attending: PEDIATRICS
Payer: COMMERCIAL

## 2020-12-29 VITALS
TEMPERATURE: 98 F | OXYGEN SATURATION: 99 % | HEART RATE: 79 BPM | DIASTOLIC BLOOD PRESSURE: 68 MMHG | RESPIRATION RATE: 16 BRPM | SYSTOLIC BLOOD PRESSURE: 107 MMHG

## 2020-12-29 VITALS — WEIGHT: 175.04 LBS | HEIGHT: 73 IN | BODY MASS INDEX: 23.2 KG/M2

## 2020-12-29 DIAGNOSIS — C85.90 LYMPHOMA, UNSPECIFIED BODY REGION, UNSPECIFIED LYMPHOMA TYPE (H): ICD-10-CM

## 2020-12-29 DIAGNOSIS — C83.50 T LYMPHOBLASTIC LYMPHOMA (H): Primary | ICD-10-CM

## 2020-12-29 DIAGNOSIS — K85.31 DRUG-INDUCED ACUTE PANCREATITIS WITH UNINFECTED NECROSIS: ICD-10-CM

## 2020-12-29 LAB
ALBUMIN SERPL-MCNC: 4 G/DL (ref 3.4–5)
ALP SERPL-CCNC: 64 U/L (ref 40–150)
ALT SERPL W P-5'-P-CCNC: 21 U/L (ref 0–70)
ANION GAP SERPL CALCULATED.3IONS-SCNC: 2 MMOL/L (ref 3–14)
AST SERPL W P-5'-P-CCNC: 10 U/L (ref 0–45)
BASOPHILS # BLD AUTO: 0 10E9/L (ref 0–0.2)
BASOPHILS NFR BLD AUTO: 0.7 %
BILIRUB SERPL-MCNC: 0.4 MG/DL (ref 0.2–1.3)
BUN SERPL-MCNC: 17 MG/DL (ref 7–30)
CALCIUM SERPL-MCNC: 8.6 MG/DL (ref 8.5–10.1)
CHLORIDE SERPL-SCNC: 108 MMOL/L (ref 94–109)
CO2 SERPL-SCNC: 31 MMOL/L (ref 20–32)
CREAT SERPL-MCNC: 0.73 MG/DL (ref 0.66–1.25)
DIFFERENTIAL METHOD BLD: ABNORMAL
EOSINOPHIL # BLD AUTO: 0.1 10E9/L (ref 0–0.7)
EOSINOPHIL NFR BLD AUTO: 2.1 %
ERYTHROCYTE [DISTWIDTH] IN BLOOD BY AUTOMATED COUNT: 13.9 % (ref 10–15)
GFR SERPL CREATININE-BSD FRML MDRD: >90 ML/MIN/{1.73_M2}
GLUCOSE SERPL-MCNC: 96 MG/DL (ref 70–99)
HCT VFR BLD AUTO: 35.2 % (ref 40–53)
HGB BLD-MCNC: 12.3 G/DL (ref 13.3–17.7)
IMM GRANULOCYTES # BLD: 0 10E9/L (ref 0–0.4)
IMM GRANULOCYTES NFR BLD: 0.4 %
LABORATORY COMMENT REPORT: NORMAL
LYMPHOCYTES # BLD AUTO: 0.5 10E9/L (ref 0.8–5.3)
LYMPHOCYTES NFR BLD AUTO: 16.1 %
MCH RBC QN AUTO: 35.9 PG (ref 26.5–33)
MCHC RBC AUTO-ENTMCNC: 34.9 G/DL (ref 31.5–36.5)
MCV RBC AUTO: 103 FL (ref 78–100)
MONOCYTES # BLD AUTO: 0.2 10E9/L (ref 0–1.3)
MONOCYTES NFR BLD AUTO: 6.4 %
NEUTROPHILS # BLD AUTO: 2.1 10E9/L (ref 1.6–8.3)
NEUTROPHILS NFR BLD AUTO: 74.3 %
NRBC # BLD AUTO: 0 10*3/UL
NRBC BLD AUTO-RTO: 0 /100
PLATELET # BLD AUTO: 179 10E9/L (ref 150–450)
POTASSIUM SERPL-SCNC: 4.1 MMOL/L (ref 3.4–5.3)
PROT SERPL-MCNC: 6.5 G/DL (ref 6.8–8.8)
RBC # BLD AUTO: 3.43 10E12/L (ref 4.4–5.9)
SARS-COV-2 RNA SPEC QL NAA+PROBE: NEGATIVE
SARS-COV-2 RNA SPEC QL NAA+PROBE: NORMAL
SODIUM SERPL-SCNC: 142 MMOL/L (ref 133–144)
SPECIMEN SOURCE: NORMAL
SPECIMEN SOURCE: NORMAL
WBC # BLD AUTO: 2.8 10E9/L (ref 4–11)

## 2020-12-29 PROCEDURE — 96409 CHEMO IV PUSH SNGL DRUG: CPT

## 2020-12-29 PROCEDURE — 258N000003 HC RX IP 258 OP 636: Performed by: NURSE PRACTITIONER

## 2020-12-29 PROCEDURE — 999N000131 HC STATISTIC POST-PROCEDURE RECOVERY CARE: Performed by: PEDIATRICS

## 2020-12-29 PROCEDURE — 250N000011 HC RX IP 250 OP 636: Performed by: PEDIATRICS

## 2020-12-29 PROCEDURE — 250N000011 HC RX IP 250 OP 636: Performed by: NURSE PRACTITIONER

## 2020-12-29 PROCEDURE — 99215 OFFICE O/P EST HI 40 MIN: CPT | Mod: 25 | Performed by: PEDIATRICS

## 2020-12-29 PROCEDURE — 80299 QUANTITATIVE ASSAY DRUG: CPT | Performed by: NURSE PRACTITIONER

## 2020-12-29 PROCEDURE — 370N000001 HC ANESTHESIA TECHNICAL FEE, 1ST 30 MIN: Performed by: PEDIATRICS

## 2020-12-29 PROCEDURE — 250N000011 HC RX IP 250 OP 636: Performed by: NURSE ANESTHETIST, CERTIFIED REGISTERED

## 2020-12-29 PROCEDURE — 999N000141 HC STATISTIC PRE-PROCEDURE NURSING ASSESSMENT: Performed by: PEDIATRICS

## 2020-12-29 PROCEDURE — 96450 CHEMOTHERAPY INTO CNS: CPT | Mod: GC | Performed by: PEDIATRICS

## 2020-12-29 PROCEDURE — 85025 COMPLETE CBC W/AUTO DIFF WBC: CPT | Performed by: NURSE PRACTITIONER

## 2020-12-29 PROCEDURE — 258N000003 HC RX IP 258 OP 636: Performed by: PEDIATRICS

## 2020-12-29 PROCEDURE — 258N000003 HC RX IP 258 OP 636: Performed by: NURSE ANESTHETIST, CERTIFIED REGISTERED

## 2020-12-29 PROCEDURE — 80053 COMPREHEN METABOLIC PANEL: CPT | Performed by: NURSE PRACTITIONER

## 2020-12-29 PROCEDURE — 87635 SARS-COV-2 COVID-19 AMP PRB: CPT | Performed by: PEDIATRICS

## 2020-12-29 PROCEDURE — 250N000011 HC RX IP 250 OP 636

## 2020-12-29 PROCEDURE — 250N000009 HC RX 250: Performed by: NURSE ANESTHETIST, CERTIFIED REGISTERED

## 2020-12-29 PROCEDURE — 89050 BODY FLUID CELL COUNT: CPT | Performed by: NURSE PRACTITIONER

## 2020-12-29 PROCEDURE — 96450 CHEMOTHERAPY INTO CNS: CPT | Performed by: PEDIATRICS

## 2020-12-29 PROCEDURE — 250N000009 HC RX 250

## 2020-12-29 RX ORDER — PROPOFOL 10 MG/ML
INJECTION, EMULSION INTRAVENOUS PRN
Status: DISCONTINUED | OUTPATIENT
Start: 2020-12-29 | End: 2020-12-29

## 2020-12-29 RX ORDER — ONDANSETRON 2 MG/ML
INJECTION INTRAMUSCULAR; INTRAVENOUS PRN
Status: DISCONTINUED | OUTPATIENT
Start: 2020-12-29 | End: 2020-12-29

## 2020-12-29 RX ORDER — HEPARIN SODIUM,PORCINE 10 UNIT/ML
5 VIAL (ML) INTRAVENOUS ONCE
Status: DISCONTINUED | OUTPATIENT
Start: 2020-12-29 | End: 2020-12-29 | Stop reason: HOSPADM

## 2020-12-29 RX ORDER — LIDOCAINE HYDROCHLORIDE 20 MG/ML
INJECTION, SOLUTION INFILTRATION; PERINEURAL PRN
Status: DISCONTINUED | OUTPATIENT
Start: 2020-12-29 | End: 2020-12-29

## 2020-12-29 RX ORDER — LIDOCAINE 40 MG/G
CREAM TOPICAL
Status: DISCONTINUED | OUTPATIENT
Start: 2020-12-29 | End: 2020-12-29 | Stop reason: HOSPADM

## 2020-12-29 RX ORDER — HEPARIN SODIUM (PORCINE) LOCK FLUSH IV SOLN 100 UNIT/ML 100 UNIT/ML
SOLUTION INTRAVENOUS
Status: DISCONTINUED
Start: 2020-12-29 | End: 2020-12-29 | Stop reason: HOSPADM

## 2020-12-29 RX ORDER — PANTOPRAZOLE SODIUM 40 MG/1
40 TABLET, DELAYED RELEASE ORAL
Qty: 30 TABLET | Refills: 2 | Status: ON HOLD | OUTPATIENT
Start: 2020-12-29 | End: 2021-03-23

## 2020-12-29 RX ORDER — HEPARIN SODIUM,PORCINE 10 UNIT/ML
VIAL (ML) INTRAVENOUS
Status: COMPLETED
Start: 2020-12-29 | End: 2020-12-29

## 2020-12-29 RX ORDER — PREDNISONE 10 MG/1
TABLET ORAL
Qty: 40 TABLET | Refills: 2 | Status: ON HOLD | OUTPATIENT
Start: 2020-12-29 | End: 2021-03-23

## 2020-12-29 RX ORDER — MERCAPTOPURINE 50 MG/1
TABLET ORAL
Qty: 36 TABLET | Refills: 2 | Status: SHIPPED | OUTPATIENT
Start: 2020-12-29 | End: 2021-01-26

## 2020-12-29 RX ORDER — PROPOFOL 10 MG/ML
INJECTION, EMULSION INTRAVENOUS CONTINUOUS PRN
Status: DISCONTINUED | OUTPATIENT
Start: 2020-12-29 | End: 2020-12-29

## 2020-12-29 RX ORDER — SODIUM CHLORIDE, SODIUM LACTATE, POTASSIUM CHLORIDE, CALCIUM CHLORIDE 600; 310; 30; 20 MG/100ML; MG/100ML; MG/100ML; MG/100ML
INJECTION, SOLUTION INTRAVENOUS CONTINUOUS PRN
Status: DISCONTINUED | OUTPATIENT
Start: 2020-12-29 | End: 2020-12-29

## 2020-12-29 RX ORDER — DIPHENHYDRAMINE HCL 25 MG
25-50 CAPSULE ORAL EVERY 6 HOURS PRN
Qty: 30 CAPSULE | Refills: 1 | Status: SHIPPED | OUTPATIENT
Start: 2020-12-29 | End: 2022-01-07

## 2020-12-29 RX ORDER — HEPARIN SODIUM,PORCINE 10 UNIT/ML
3-6 VIAL (ML) INTRAVENOUS EVERY 24 HOURS
Status: DISCONTINUED | OUTPATIENT
Start: 2020-12-29 | End: 2020-12-29 | Stop reason: HOSPADM

## 2020-12-29 RX ORDER — LIDOCAINE 40 MG/G
CREAM TOPICAL
Status: COMPLETED
Start: 2020-12-29 | End: 2020-12-29

## 2020-12-29 RX ADMIN — LIDOCAINE HYDROCHLORIDE 40 MG: 20 INJECTION, SOLUTION INFILTRATION; PERINEURAL at 12:51

## 2020-12-29 RX ADMIN — PROPOFOL 300 MCG/KG/MIN: 10 INJECTION, EMULSION INTRAVENOUS at 12:51

## 2020-12-29 RX ADMIN — PROPOFOL 20 MG: 10 INJECTION, EMULSION INTRAVENOUS at 12:56

## 2020-12-29 RX ADMIN — PROPOFOL 30 MG: 10 INJECTION, EMULSION INTRAVENOUS at 12:58

## 2020-12-29 RX ADMIN — LIDOCAINE: 40 CREAM TOPICAL at 13:08

## 2020-12-29 RX ADMIN — VINCRISTINE SULFATE 2 MG: 1 INJECTION, SOLUTION INTRAVENOUS at 11:52

## 2020-12-29 RX ADMIN — ONDANSETRON 4 MG: 2 INJECTION INTRAMUSCULAR; INTRAVENOUS at 12:51

## 2020-12-29 RX ADMIN — Medication 5 ML: at 11:58

## 2020-12-29 RX ADMIN — SODIUM CHLORIDE 50 ML: 9 INJECTION, SOLUTION INTRAVENOUS at 11:49

## 2020-12-29 RX ADMIN — PROPOFOL 100 MG: 10 INJECTION, EMULSION INTRAVENOUS at 12:51

## 2020-12-29 RX ADMIN — HEPARIN 500 UNITS: 100 SYRINGE at 13:54

## 2020-12-29 RX ADMIN — METHOTREXATE: 25 INJECTION INTRA-ARTERIAL; INTRAMUSCULAR; INTRATHECAL; INTRAVENOUS at 12:54

## 2020-12-29 RX ADMIN — SODIUM CHLORIDE, POTASSIUM CHLORIDE, SODIUM LACTATE AND CALCIUM CHLORIDE: 600; 310; 30; 20 INJECTION, SOLUTION INTRAVENOUS at 12:51

## 2020-12-29 RX ADMIN — HEPARIN, PORCINE (PF) 10 UNIT/ML INTRAVENOUS SYRINGE 5 ML: at 11:58

## 2020-12-29 ASSESSMENT — MIFFLIN-ST. JEOR: SCORE: 1842.13

## 2020-12-29 NOTE — ANESTHESIA CARE TRANSFER NOTE
Patient: Lazaro Lund    Procedure(s):  Lumbar puncture with intrathecal Chemotherapy (CD)    Diagnosis: Lymphoma (H) [C85.90]  Diagnosis Additional Information: No value filed.    Anesthesia Type:   General     Note:  Airway :Nasal Cannula  Patient transferred to:PS Recovery  Comments: Patient transferred back to peds sedation recovery on nasal cannula at 2 liters per minute. VSS upon arrival, respirations WNL. Report to RN and questions answered.    YOHAN Toribio CRNA on 12/29/2020 at 1:09 PM    Handoff Report: Identifed the Patient, Identified the Reponsible Provider, Reviewed the pertinent medical history, Discussed the surgical course, Reviewed Intra-OP anesthesia mangement and issues during anesthesia, Set expectations for post-procedure period and Allowed opportunity for questions and acknowledgement of understanding      Vitals: (Last set prior to Anesthesia Care Transfer)    CRNA VITALS  12/29/2020 1234 - 12/29/2020 1309      12/29/2020             Pulse:  68    Temp:  36.4  C (97.5  F)    SpO2:  100 %    Resp Rate (observed):  25                Electronically Signed By: YOHAN Toribio CRNA  December 29, 2020  1:09 PM

## 2020-12-29 NOTE — PROGRESS NOTES
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 Lazaro presented with acute onset cough and was found to have an anterior mediastinal mass and malignant left-sided pleural effusion.  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  He was admitted to [facility] oncology service 8/30 and started on treatment per [id number].  He had a pleural effusion that required a chest tube and a pericardial effusion that was not drained. His Induction was complicated by cardiac compression secondary to his mass leading to tamponade, this improved through out his hospital stay.  He also had dysphagia that improved with treatment of his mass, swallow study was normal. His course was recently complicated by extensive bilateral UE DVTs, and he was successfully treated with anticoagulation. His consolidation course was complicated by the development of severe asparaginase induced pancreatitis. He became quite ill with SIRS and associated hypotension, he required pressor support in the ICU. As a result, asparaginase was eliminated from his treatment plan. He was in CR status at end of consolidation. During maintenance, he developed hepatotoxicity so allopurinol and dose reduced 6MP were started for correction of skewed 6-MP metabolism.  Lazaro comes to clinic today for Day 1 of his 4th Maintenance cycle.      HPI:   Since his last visit Lazaro has been doing well. He denies any specific complaints aside from rare instances of blurry vision (about twice a month). 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 He states he drinks about 3 glasses of fluid a day. Otherwise, he has good appetite and energy with no paresthesias. He has not had any interval illnesses. He has not missed any doses of oral chemotherapy and has no side effects at this time. His  New Year's resolution is to discontinue smoking marijuana, which we fully support given his increased risk of pulmonary toxicity / infection from smoking    Review of systems:  The 10 point Review of Systems is negative other than noted in the HPI or here.      PMH:   Past Medical History:   Diagnosis Date     Acute necrotizing pancreatitis 11/07/2019    attributed to asparaginase     Acute pancreatitis due to PEGaspariginase therapy  11/15/2019     DVT of upper extremity (deep vein thrombosis) (H) 09/26/2019    Bilateral      Edema of upper extremity 10/17/2019     Folliculitis 10/17/2019     Migraine 2006    have resolved     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     -- Spinal HA following LP  -- TPMT shows Intermediate activity with normal TPMT/NUDT15 genotype  -- Factor V leiden and prothrombin gene mutation negative  -- Necrotizing pancreatitis secondary to asparaginase  -- former smoke, quit with the use of nicotine patches. Does smoke marijuana daily, working on decreasing frequency     PFMH:   Family History   Problem Relation Age of Onset     No Known Problems Mother      No Known Problems Father      Asthma Brother      Thyroid Cancer Paternal Grandmother      Melanoma Paternal Aunt        Social History:   Social History     Social History Narrative    Lazaro previously lived at home with his dad and step mom in [place] but is now staying with his grandma in [place]. Biological mother is involved in his life. Has 4 step-siblings and 1 biological sibling. 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 Has completed high school. 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  He has been enjoying fishing and video games.        Current Medications:  Current Outpatient Medications on File Prior to Visit   Medication Sig Dispense Refill     allopurinol (ZYLOPRIM) 100 MG tablet Take 1  tablet (100 mg) by mouth daily 30 tablet 11     diphenhydrAMINE (BENADRYL) 25 MG capsule Take 1-2 capsules (25-50 mg) by mouth every 6 hours as needed (Breakthrough Nausea and Vomiting ) (Patient not taking: Reported on 10/6/2020) 30 capsule 1     lidocaine-prilocaine (EMLA) 2.5-2.5 % external cream Apply topically as needed for other (prior to port access) 30 g 6     mercaptopurine (PURINETHOL) 50 MG tablet Take 1 tablet Monday - Friday; Take 1.5 tablets Sat and Sun 32 tablet 2     methotrexate 2.5 MG tablet Take 17 tablets (42.5mg) weekly on Tuesdays. Do not take on Days of LP. 68 tablet 2     ondansetron (ZOFRAN-ODT) 8 MG ODT tab Take 1 tablet (8 mg) by mouth every 8 hours as needed for nausea 20 tablet 6     pantoprazole (PROTONIX) 40 MG EC tablet Take 1 tablet (40 mg) by mouth every morning (before breakfast) 30 tablet 2     polyethylene glycol (MIRALAX/GLYCOLAX) packet Take 17 g by mouth daily 30 packet 0     predniSONE (DELTASONE) 10 MG tablet Take 40mg (4 tabs) twice dialy for 5 days every 4 weeks. 40 tablet 2     sennosides (SENOKOT) 8.6 MG tablet Take 2 tablets by mouth 2 times daily as needed for constipation 60 each 1     sulfamethoxazole-trimethoprim (BACTRIM DS) 800-160 MG tablet Take 1 tablet by mouth Every Mon, Tues two times daily 16 tablet 11     Vitamin D, Cholecalciferol, 25 MCG (1000 UT) TABS Take 1 tablet (1,000 Units) by mouth daily 30 tablet 3     Reviewed medications with Lazaro.  Not taking ativan or scopolamine.  Confirmed oral chemo doses, has not missed any doses. He is not taking Vitamin D currently.  Received [id number] influenza vaccine on 12/22/20     Physical Exam:   Vital signs:  BP Readings from Last 1 Encounters:   12/29/20 107/68      Pulse Readings from Last 1 Encounters:   12/29/20 79      Resp Readings from Last 1 Encounters:   12/29/20 16      Temp Readings from Last 1 Encounters:   12/29/20 98  F (36.7  C) (Oral)      SpO2 Readings from Last 1 Encounters:   12/29/20 99%     "  Wt Readings from Last 1 Encounters:   12/29/20 79.4 kg (175 lb 0.7 oz)      Ht Readings from Last 1 Encounters:   12/29/20 1.845 m (6' 0.64\")     Wt Readings from Last 4 Encounters:   12/29/20 79.4 kg (175 lb 0.7 oz)   12/01/20 79 kg (174 lb 2.6 oz)   11/03/20 80.7 kg (177 lb 14.6 oz)   10/06/20 80.1 kg (176 lb 9.4 oz)     Ht Readings from Last 2 Encounters:   12/29/20 1.845 m (6' 0.64\")   12/01/20 1.846 m (6' 0.68\")     General: Lazaro is alert, interactive and appropriate.  HEENT: Skull is atrauamatic and normocephalic.  Full head of hair. PERRLA, sclera are non icteric and not injected, EOM are intact. Nares are patent without drainage. Oropharynx clear, no plaques noted. Buccal mucosa and tongue clear. MMM.  Neck:  Supple without lymphadenopathy.   Lymph: There is no cervical, supraclavicular, axillary, lymphadenopathy palpated.  Cardiovascular:  HR is regular, S1, S2 no murmur.  Capillary refill is < 2 seconds.   Respiratory: No cough noted. Respirations are easy.  Lungs are clear to auscultation throughout.  No crackles or wheezes.  Gastrointestinal: BS present in all quadrants.  Abdomen is soft and flat.  No pain with palpation. No hepatosplenomegaly or masses are palpated.  Skin: No concerning lesions  Neurological:  CN 2-12 grossly intact. Gait is normal.  No issues with balance. Sensation intact in hands and feet.     Musculoskeletal:  Good strength and ROM in all extremities.  Strong dorsiflexion at ankles and great toes (5/5) bilaterally without any pain at the Achilles.     Labs:   Results for orders placed or performed in visit on 12/29/20   Asymptomatic COVID-19 Virus (Coronavirus) by PCR     Status: None    Specimen: Nasopharyngeal   Result Value Ref Range    COVID-19 Virus PCR to [facility] - Source Canceled, Test credited     COVID-19 Virus PCR to [facility] - Result Canceled, Test credited    SARS-CoV-2 COVID-19 Virus (Coronavirus) by PCR     Status: None    Specimen: Nasopharyngeal   Result Value Ref " Range    SARS-CoV-2 Virus Specimen Source Nasopharyngeal     SARS-CoV-2 PCR Result NEGATIVE     SARS-CoV-2 PCR Comment (Note)    Results for orders placed or performed in visit on 12/29/20   Comprehensive metabolic panel     Status: Abnormal   Result Value Ref Range    Sodium 142 133 - 144 mmol/L    Potassium 4.1 3.4 - 5.3 mmol/L    Chloride 108 94 - 109 mmol/L    Carbon Dioxide 31 20 - 32 mmol/L    Anion Gap 2 (L) 3 - 14 mmol/L    Glucose 96 70 - 99 mg/dL    Urea Nitrogen 17 7 - 30 mg/dL    Creatinine 0.73 0.66 - 1.25 mg/dL    GFR Estimate >90 >60 mL/min/[1.73_m2]    GFR Estimate If Black >90 >60 mL/min/[1.73_m2]    Calcium 8.6 8.5 - 10.1 mg/dL    Bilirubin Total 0.4 0.2 - 1.3 mg/dL    Albumin 4.0 3.4 - 5.0 g/dL    Protein Total 6.5 (L) 6.8 - 8.8 g/dL    Alkaline Phosphatase 64 40 - 150 U/L    ALT 21 0 - 70 U/L    AST 10 0 - 45 U/L   CBC with platelets differential     Status: Abnormal   Result Value Ref Range    WBC 2.8 (L) 4.0 - 11.0 10e9/L    RBC Count 3.43 (L) 4.4 - 5.9 10e12/L    Hemoglobin 12.3 (L) 13.3 - 17.7 g/dL    Hematocrit 35.2 (L) 40.0 - 53.0 %     (H) 78 - 100 fl    MCH 35.9 (H) 26.5 - 33.0 pg    MCHC 34.9 31.5 - 36.5 g/dL    RDW 13.9 10.0 - 15.0 %    Platelet Count 179 150 - 450 10e9/L    Diff Method Automated Method     % Neutrophils 74.3 %    % Lymphocytes 16.1 %    % Monocytes 6.4 %    % Eosinophils 2.1 %    % Basophils 0.7 %    % Immature Granulocytes 0.4 %    Nucleated RBCs 0 0 /100    Absolute Neutrophil 2.1 1.6 - 8.3 10e9/L    Absolute Lymphocytes 0.5 (L) 0.8 - 5.3 10e9/L    Absolute Monocytes 0.2 0.0 - 1.3 10e9/L    Absolute Eosinophils 0.1 0.0 - 0.7 10e9/L    Absolute Basophils 0.0 0.0 - 0.2 10e9/L    Abs Immature Granulocytes 0.0 0 - 0.4 10e9/L    Absolute Nucleated RBC 0.0        12/29/2020 Therapeutic LP  A Lumbar Puncture was performed in the Pediatric Sedation Suite. Informed consent was obtained prior to the procedure. Lazaro Lund was identified by facial recognition and ID  arm band. A time-out was performed. Lazaro Lund was then placed in the left lateral decubitus position and the lumbosacral area was sterily prepped using Chloraprep followed by drape placement. Anatomic landmarks were identified by palpation. Then, a 22 gauge, 3.5 inch spinal needle was easily inserted into the L3-L4 interspace. On the first attempt approximately 3 mL of clear and colorless cerebrospinal fluid was obtained to be sent to the lab for cell count analysis and cytospin. Following that, 15mg of intrathecal Methotrexate in 6 mL of preservative-free normal saline was infused without resistance. The needle was removed and a Band-Aid applied. Lazaro Lund tolerated this procedure very well.    Signed,  Nicho Fischer   Pediatric Hematology/Oncology Fellow    Physician Attestation  I was present during the entire procedure. I saw and evaluated the patient. I discussed with the fellow and agree with the findings and plan as documented in the fellow's note.     Reynaldo Campuzano MD    Assessment:  Lazaro Lund is a 20 year old young man with T cell lymphoblastic lymphoma (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  He is being treated per [facility] protocol [id number]. He's had a CRu following Induction with a CR at the end of Consolidation. His course was complicated by extensive bilateral DVT and severe necrotizing pancreatitis requiring cessation of PEG-asparaginase from his treatment.  He comes to clinic today for Day 1 Cycle 4 of Maintenance therapy.     Overall, he has been doing well. He is currently on 37.5% full dose of 6MP with allopurinol for correction of skewed 6-MP metabolism and 100% methotrexate. ANC is above target range today, but was in range 1 month ago. If remains elevated with next check, will plan to escalate dose to 75mg daily (50% full dose of 6MP)    Plan:   1) Labs reviewed with Lazaro.  Continue current doses of oral chemo.  2) Start prednisone burst.  3) Protonix during steroid bursts.  4) Most  recent Vit D level was normal however Lazaro should continue on Vit D 1000IU daily  5) Bactrim for PCP prophylaxis.  6) Continue to check CMP and TGNs monthly moving forward.  7) Discussed with Lazaro that he will need COVID testing prior to LPs moving forward.  He prefers to come the day of, have it done in sedation and then come to clinic for chemo while he is waiting for results.   8) RTC in 4 weeks for D29 of Cycle 4     Signed,  Nicho Fischer   Pediatric Hematology/Oncology Fellow    Physician Attestation    I saw and evaluated the patient. I discussed with the fellow and agree with the findings and plan as documented in the fellow's note.     Reynaldo Campuzano MD  Pediatric Hematology/Oncology  [facility]

## 2020-12-29 NOTE — DISCHARGE INSTRUCTIONS
Home Instructions for Your Child after Sedation  Today your child received (medicine):  Propofol and Zofran  Please keep this form with your health records  Your child may be more sleepy and irritable today than normal. Also, an adult should stay with your child for the rest of the day. The medicine may make the child dizzy. Avoid activities that require balance (bike riding, skating, climbing stairs, walking).  Remember:    When your child wants to eat again, start with liquids (juice, soda pop, Popsicles). If your child feels well enough, you may try a regular diet. It is best to offer light meals for the first 24 hours.    If your child has nausea (feels sick to the stomach) or vomiting (throws up), give small amounts of clear liquids (7-Up, Sprite, apple juice or broth). Fluids are more important than food until your child is feeling better.    Wait 24 hours before giving medicine that contains alcohol. This includes liquid cold, cough and allergy medicines (Robitussin, Vicks Formula 44 for children, Benadryl, Chlor-Trimeton).    If you will leave your child with a , give the sitter a copy of these instructions.  Call your doctor if:    You have questions about the test results.    Your child vomits (throws up) more than two times.    Your child is very fussy or irritable.    You have trouble waking your child.     If your child has trouble breathing, call 911.  If you have any questions or concerns, please call:  Pediatric Sedation Unit [phone]  Pediatric clinic  [phone]  [facility]  [phone] (ask for the Pediatric Anesthesiologist doctor on call)  Emergency department [phone]  [facility] toll-free number [phone] (Monday--Friday, 8 a.m. to 4:30 p.m.)  I understand these instructions. I have all of my personal belongings.        [facility]   [phone]    Care post Lumbar Puncture     Do not remove bandage/dressing for 24 hours -- after this time they can  be removed    No bath, shower or soaking of the dressing for 24 hours    Activity as tolerated by the patient    Diet as able to tolerated    May use Tylenol as needed for pain control -- DO NOT use Ibuprofen    Can apply icepack to the site for discomfort -- no more than 10 minutes at a time    If bleeding presents apply pressure for 5 minutes    Call [phone] ask for Peds BMT/Hem/Onc fellow on call if complications arise including:    persistent bleeding    fever greater than 100.5    Pain    Lumbar punctures can cause headache. If the pain is not controlled with Tylenol (acetaminophen) please call the Peds BMT/Hem/Onc fellow on call

## 2020-12-29 NOTE — ANESTHESIA PREPROCEDURE EVALUATION
Anesthesia Pre-Procedure Evaluation    Patient: Lazaro Lund   MRN:     [id number] Gender:   male   Age:    20 year old :      2000        Preoperative Diagnosis: Lymphoma (H) [C85.90]   Procedure(s):  Lumbar puncture with intrathecal Chemotherapy     LABS:  CBC:   Lab Results   Component Value Date    WBC 1.8 (L) 2020    WBC 3.2 (L) 2020    HGB 12.1 (L) 2020    HGB 11.8 (L) 2020    HCT 34.7 (L) 2020    HCT 34.2 (L) 2020     2020     2020     BMP:   Lab Results   Component Value Date     2020     2020    POTASSIUM 3.9 2020    POTASSIUM 3.8 2020    CHLORIDE 109 2020    CHLORIDE 110 (H) 2020    CO2 30 2020    CO2 28 2020    BUN 15 2020    BUN 15 2020    CR 0.62 (L) 2020    CR 0.65 (L) 2020    GLC 92 2020    GLC 93 2020     COAGS:   Lab Results   Component Value Date    PTT 45 (H) 2019    INR 1.09 2019    FIBR 293 2019     POC:   Lab Results   Component Value Date    BGM 87 2019     OTHER:   Lab Results   Component Value Date    LACT 0.4 (L) 2019    MICHAEL 8.7 2020    PHOS 4.2 2019    MAG 2.1 2019    ALBUMIN 4.2 2020    PROTTOTAL 6.3 (L) 2020    ALT 22 2020    AST 7 2020    ALKPHOS 65 2020    BILITOTAL 0.6 2020    LIPASE 21 (L) 2020    AMYLASE 24 (L) 2020    .0 (H) 2019        Preop Vitals    BP Readings from Last 3 Encounters:   20 110/65   20 (!) 88/48   20 120/67    Pulse Readings from Last 3 Encounters:   20 65   20 56   20 63      Resp Readings from Last 3 Encounters:   20 18   20 19   20 16    SpO2 Readings from Last 3 Encounters:   20 99%   20 98%   20 99%      Temp Readings from Last 1 Encounters:   20 36.9  C (98.4  F) (Oral)    Ht Readings from Last 1 Encounters:  "  12/01/20 1.846 m (6' 0.68\")      Wt Readings from Last 1 Encounters:   12/01/20 79 kg (174 lb 2.6 oz)    Estimated body mass index is 23.18 kg/m  as calculated from the following:    Height as of 12/1/20: 1.846 m (6' 0.68\").    Weight as of 12/1/20: 79 kg (174 lb 2.6 oz).     LDA:  Port A Cath Single 10/24/19 Right Chest wall (Active)   Number of days: 431       Airway - Adult/Peds (Active)   Number of days: 172        Past Medical History:   Diagnosis Date     Acute necrotizing pancreatitis 11/07/2019    attributed to asparaginase     Acute pancreatitis due to PEGaspariginase therapy  11/15/2019     DVT of upper extremity (deep vein thrombosis) (H) 09/26/2019    Bilateral      Edema of upper extremity 10/17/2019     Folliculitis 10/17/2019     Migraine 2006    have resolved     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      Past Surgical History:   Procedure Laterality Date     BONE MARROW BIOPSY, BONE SPECIMEN, NEEDLE/TROCAR Left 9/1/2019    Procedure: BIOPSY, BONE MARROW;  Surgeon: Heather Lopez MD;  Location: [facility] OR     INSERT PICC LINE N/A 8/31/2019    Procedure: INSERTION, PICC;  Surgeon: Michell Keith MD;  Location: [facility] OR     INSERT PORT VASCULAR ACCESS N/A 10/24/2019    Procedure: INSERTION, VASCULAR ACCESS PORT;  Surgeon: Silviano Martins MD;  Location: [facility] PEDS SEDATION      IR CHEST PORT PLACEMENT > 5 YRS OF AGE  10/24/2019     IR CHEST TUBE PLACEMENT NON-TUNNELLED LEFT  8/31/2019     IR PICC PLACEMENT > 5 YRS OF AGE  8/31/2019     IR PORT CHECK RIGHT  11/12/2019     SPINAL PUNCTURE,LUMBAR, INTRATHECAL CHEMO DELIVERY N/A 8/31/2019    Procedure: LUMBAR PUNCTURE, WITH INTRATHECAL CHEMOTHERAPY ADMINISTRATION;  Surgeon: Heather Lopez MD;  [facility] OR     SPINAL PUNCTURE,LUMBAR, INTRATHECAL CHEMO DELIVERY N/A 9/9/2019    Procedure: Lumbar Puncture With Intrathecal Chemo;  Surgeon: Alexi Hayes MD;  [facility]     SPINAL PUNCTURE,LUMBAR, INTRATHECAL CHEMO DELIVERY N/A " 10/10/2019    Procedure: Lumbar puncture with IT Chemo (CD);  Surgeon: Reynaldo Campuzano MD;  [facility] P[facility] SED[facility]      SPINAL PUNCTURE,LUMBAR, INTRATHECAL CHEMO DELIVERY N/A 10/17/2019    Procedure: Lumbar puncture with IT Chemo (not CD);  Surgeon: Reynaldo Campuzano MD;  [facility]      SPINAL PUNCTURE,LUMBAR, INTRATHECAL CHEMO DELIVERY N/A 10/24/2019    Procedure: LUMBAR PUNCTURE, WITH INTRATHECAL CHEMOTHERAPY ADMINISTRATION;  Surgeon: Félix Van APRN CNP;  [facility]      SPINAL PUNCTURE,LUMBAR, INTRATHECAL CHEMO DELIVERY N/A 10/31/2019    Procedure: Lumbar puncture with IT Chemo (not CD);  Surgeon: Reynaldo Campuzano MD;  [facility]      SPINAL PUNCTURE,LUMBAR, INTRATHECAL CHEMO DELIVERY N/A 12/19/2019    Procedure: Lumbar puncture with IT Chem (CD);  Surgeon: Félix Van APRN CNP;  [facility]      SPINAL PUNCTURE,LUMBAR, INTRATHECAL CHEMO DELIVERY N/A 12/23/2019    Procedure: Lumbar puncture with IT Chem (CD);  Surgeon: Heather Lopez MD;  [facility]      SPINAL PUNCTURE,LUMBAR, INTRATHECAL CHEMO DELIVERY N/A 1/23/2020    Procedure: Lumbar puncture with IT Chem (CD);  Surgeon: Reynaldo Campuzano MD;  [facility]      SPINAL PUNCTURE,LUMBAR, INTRATHECAL CHEMO DELIVERY N/A 2/20/2020    Procedure: Lumbar puncture with intrathecal Chemotherapy (CD);  Surgeon: Reynaldo Campuzano MD;  [facility]      SPINAL PUNCTURE,LUMBAR, INTRATHECAL CHEMO DELIVERY N/A 3/19/2020    Procedure: Lumbar puncture with intrathecal Chemotherapy (CD);  Surgeon: Reynaldo Campuzano MD;  [facility]      SPINAL PUNCTURE,LUMBAR, INTRATHECAL CHEMO DELIVERY N/A 3/26/2020    Procedure: Lumbar puncture with intrathecal Chemotherapy (not CD);  Surgeon: Todd Mercado MD;  [facility]      SPINAL PUNCTURE,LUMBAR, INTRATHECAL CHEMO DELIVERY  N/A 4/23/2020    Procedure: Lumbar puncture with intrathecal Chemotherapy (CD);  Surgeon: Marleny Brewer MD;  [facility]      SPINAL PUNCTURE,LUMBAR, INTRATHECAL CHEMO DELIVERY N/A 5/14/2020    Procedure: Lumbar puncture with intrathecal Chemotherapy (not CD);  Surgeon: Todd Mercado MD;  [facility]      SPINAL PUNCTURE,LUMBAR, INTRATHECAL CHEMO DELIVERY N/A 7/9/2020    Procedure: Lumbar puncture with intrathecal Chemotherapy (CD);  Surgeon: Todd Mercado MD;  [facility]      SPINAL PUNCTURE,LUMBAR, INTRATHECAL CHEMO DELIVERY N/A 8/6/2020    Procedure: Lumbar puncture with intrathecal Chemotherapy (not CD);  Surgeon: Todd Mercado MD;  [facility]      SPINAL PUNCTURE,LUMBAR, INTRATHECAL CHEMO DELIVERY N/A 10/6/2020    Procedure: Lumbar puncture with intrathecal Chemotherapy (not CD);  Surgeon: Félix Van, APRN CNP;  [facility]      SPINAL PUNCTURE,LUMBAR, INTRATHECAL CHEMO DELIVERY N/A 11/3/2020    Procedure: Lumbar puncture with intrathecal Chemotherapy (not CD);  Surgeon: Reynaldo Campuzano MD;  [facility]      THORACENTESIS N/A 8/31/2019    Procedure: Thoracentesis;  Surgeon: Michell Keith MD;  [facility] OR      Allergies   Allergen Reactions     Asparaginase Derivatives Other (See Comments)     Severe pancreatitis     No Known Drug Allergies         Anesthesia Evaluation    ROS/Med Hx    No history of anesthetic complications    Cardiovascular Findings - negative ROS    Neuro Findings - negative ROS    Pulmonary Findings - negative ROS  (-) recent URI    HENT Findings - negative HENT ROS    Skin Findings - negative skin ROS      GI/Hepatic/Renal Findings - negative ROS  Comments: H/o necrotizing pancreatitis d/t asparaginase     Endocrine/Metabolic Findings - negative ROS        Hematology/Oncology Findings   (+) cancer and blood dyscrasia    Additional Notes  Smokes  marijuana, some UE edema          PHYSICAL EXAM:   Mental Status/Neuro: A/A/O   Airway: Facies: Feasible  Mallampati: I  Mouth/Opening: Full  TM distance: > 6 cm  Neck ROM: Full   Respiratory: Auscultation: CTAB     Resp. Rate: Normal     Resp. Effort: Normal      CV: Rhythm: Regular  Rate: Age appropriate  Heart: Normal Sounds  Edema: None   Comments:      Dental: Details                  Assessment:   ASA SCORE: 3    H&P: History and physical reviewed and following examination; no interval change.    NPO Status: NPO Appropriate     Plan:   Anes. Type:  General   Pre-Medication: None   Induction:  IV (Standard)   Airway: Native Airway   Access/Monitoring: PIV   Maintenance: Propofol Sedation     Postop Plan:   Postop Pain: None  Postop Sedation/Airway: Not planned  Disposition: Outpatient     PONV Management:    Prevention: Ondansetron, Propofol     CONSENT: Direct conversation   Plan and risks discussed with: Patient   Blood Products: Consent Deferred (Minimal Blood Loss)             Azul Wallace MD

## 2020-12-29 NOTE — LETTER
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 Lazaro presented with acute onset cough and was found to have an anterior mediastinal mass and malignant left-sided pleural effusion.  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  He was admitted to [facility] oncology service 8/30 and started on treatment per [id number].  He had a pleural effusion that required a chest tube and a pericardial effusion that was not drained. His Induction was complicated by cardiac compression secondary to his mass leading to tamponade, this improved through out his hospital stay.  He also had dysphagia that improved with treatment of his mass, swallow study was normal. His course was recently complicated by extensive bilateral UE DVTs, and he was successfully treated with anticoagulation. His consolidation course was complicated by the development of severe asparaginase induced pancreatitis. He became quite ill with SIRS and associated hypotension, he required pressor support in the ICU. As a result, asparaginase was eliminated from his treatment plan. He was in CR status at end of consolidation. During maintenance, he developed hepatotoxicity so allopurinol and dose reduced 6MP were started for correction of skewed 6-MP metabolism.  Lazaro comes to clinic today for Day 1 of his 4th Maintenance cycle.      HPI:   Since his last visit Lazaro has been doing well. He denies any specific complaints aside from rare instances of blurry vision (about twice a month). 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 He states he drinks about 3 glasses of fluid a day. Otherwise, he has good appetite and energy with no paresthesias. He has not had any interval illnesses. He  has not missed any doses of oral chemotherapy and has no side effects at this time. His New Year's resolution is to discontinue smoking marijuana, which we fully support given his increased risk of pulmonary toxicity / infection from smoking    Review of systems:  The 10 point Review of Systems is negative other than noted in the HPI or here.      PMH:   Past Medical History:   Diagnosis Date     Acute necrotizing pancreatitis 11/07/2019    attributed to asparaginase     Acute pancreatitis due to PEGaspariginase therapy  11/15/2019     DVT of upper extremity (deep vein thrombosis) (H) 09/26/2019    Bilateral      Edema of upper extremity 10/17/2019     Folliculitis 10/17/2019     Migraine 2006    have resolved     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     -- Spinal HA following LP  -- TPMT shows Intermediate activity with normal TPMT/NUDT15 genotype  -- Factor V leiden and prothrombin gene mutation negative  -- Necrotizing pancreatitis secondary to asparaginase  -- former smoke, quit with the use of nicotine patches. Does smoke marijuana daily, working on decreasing frequency     PFMH:   Family History   Problem Relation Age of Onset     No Known Problems Mother      No Known Problems Father      Asthma Brother      Thyroid Cancer Paternal Grandmother      Melanoma Paternal Aunt        Social History:   Social History     Social History Narrative    Lazaro previously lived at home with his dad and step mom in [place] but is now staying with his grandma in [place]. Biological mother is involved in his life. Has 4 step-siblings and 1 biological sibling. 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 Has completed high school. 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  He has been enjoying fishing and video games.        Current Medications:  Current Outpatient Medications on File Prior  to Visit   Medication Sig Dispense Refill     allopurinol (ZYLOPRIM) 100 MG tablet Take 1 tablet (100 mg) by mouth daily 30 tablet 11     diphenhydrAMINE (BENADRYL) 25 MG capsule Take 1-2 capsules (25-50 mg) by mouth every 6 hours as needed (Breakthrough Nausea and Vomiting ) (Patient not taking: Reported on 10/6/2020) 30 capsule 1     lidocaine-prilocaine (EMLA) 2.5-2.5 % external cream Apply topically as needed for other (prior to port access) 30 g 6     mercaptopurine (PURINETHOL) 50 MG tablet Take 1 tablet Monday - Friday; Take 1.5 tablets Sat and Sun 32 tablet 2     methotrexate 2.5 MG tablet Take 17 tablets (42.5mg) weekly on Tuesdays. Do not take on Days of LP. 68 tablet 2     ondansetron (ZOFRAN-ODT) 8 MG ODT tab Take 1 tablet (8 mg) by mouth every 8 hours as needed for nausea 20 tablet 6     pantoprazole (PROTONIX) 40 MG EC tablet Take 1 tablet (40 mg) by mouth every morning (before breakfast) 30 tablet 2     polyethylene glycol (MIRALAX/GLYCOLAX) packet Take 17 g by mouth daily 30 packet 0     predniSONE (DELTASONE) 10 MG tablet Take 40mg (4 tabs) twice dialy for 5 days every 4 weeks. 40 tablet 2     sennosides (SENOKOT) 8.6 MG tablet Take 2 tablets by mouth 2 times daily as needed for constipation 60 each 1     sulfamethoxazole-trimethoprim (BACTRIM DS) 800-160 MG tablet Take 1 tablet by mouth Every Mon, Tues two times daily 16 tablet 11     Vitamin D, Cholecalciferol, 25 MCG (1000 UT) TABS Take 1 tablet (1,000 Units) by mouth daily 30 tablet 3     Reviewed medications with Lazaro.  Not taking ativan or scopolamine.  Confirmed oral chemo doses, has not missed any doses. He is not taking Vitamin D currently.  Received [id number] influenza vaccine on 12/22/20     Physical Exam:   Vital signs:  BP Readings from Last 1 Encounters:   12/29/20 107/68      Pulse Readings from Last 1 Encounters:   12/29/20 79      Resp Readings from Last 1 Encounters:   12/29/20 16      Temp Readings from Last 1 Encounters:  "  12/29/20 98  F (36.7  C) (Oral)      SpO2 Readings from Last 1 Encounters:   12/29/20 99%      Wt Readings from Last 1 Encounters:   12/29/20 79.4 kg (175 lb 0.7 oz)      Ht Readings from Last 1 Encounters:   12/29/20 1.845 m (6' 0.64\")     Wt Readings from Last 4 Encounters:   12/29/20 79.4 kg (175 lb 0.7 oz)   12/01/20 79 kg (174 lb 2.6 oz)   11/03/20 80.7 kg (177 lb 14.6 oz)   10/06/20 80.1 kg (176 lb 9.4 oz)     Ht Readings from Last 2 Encounters:   12/29/20 1.845 m (6' 0.64\")   12/01/20 1.846 m (6' 0.68\")     General: Lazaro is alert, interactive and appropriate.  HEENT: Skull is atrauamatic and normocephalic.  Full head of hair. PERRLA, sclera are non icteric and not injected, EOM are intact. Nares are patent without drainage. Oropharynx clear, no plaques noted. Buccal mucosa and tongue clear. MMM.  Neck:  Supple without lymphadenopathy.   Lymph: There is no cervical, supraclavicular, axillary, lymphadenopathy palpated.  Cardiovascular:  HR is regular, S1, S2 no murmur.  Capillary refill is < 2 seconds.   Respiratory: No cough noted. Respirations are easy.  Lungs are clear to auscultation throughout.  No crackles or wheezes.  Gastrointestinal: BS present in all quadrants.  Abdomen is soft and flat.  No pain with palpation. No hepatosplenomegaly or masses are palpated.  Skin: No concerning lesions  Neurological:  CN 2-12 grossly intact. Gait is normal.  No issues with balance. Sensation intact in hands and feet.     Musculoskeletal:  Good strength and ROM in all extremities.  Strong dorsiflexion at ankles and great toes (5/5) bilaterally without any pain at the Achilles.     Labs:   Results for orders placed or performed in visit on 12/29/20   Asymptomatic COVID-19 Virus (Coronavirus) by PCR     Status: None    Specimen: Nasopharyngeal   Result Value Ref Range    COVID-19 Virus PCR to [facility] - Source Canceled, Test credited     COVID-19 Virus PCR to [facility] - Result Canceled, Test credited    SARS-CoV-2 " COVID-19 Virus (Coronavirus) by PCR     Status: None    Specimen: Nasopharyngeal   Result Value Ref Range    SARS-CoV-2 Virus Specimen Source Nasopharyngeal     SARS-CoV-2 PCR Result NEGATIVE     SARS-CoV-2 PCR Comment (Note)    Results for orders placed or performed in visit on 12/29/20   Comprehensive metabolic panel     Status: Abnormal   Result Value Ref Range    Sodium 142 133 - 144 mmol/L    Potassium 4.1 3.4 - 5.3 mmol/L    Chloride 108 94 - 109 mmol/L    Carbon Dioxide 31 20 - 32 mmol/L    Anion Gap 2 (L) 3 - 14 mmol/L    Glucose 96 70 - 99 mg/dL    Urea Nitrogen 17 7 - 30 mg/dL    Creatinine 0.73 0.66 - 1.25 mg/dL    GFR Estimate >90 >60 mL/min/[1.73_m2]    GFR Estimate If Black >90 >60 mL/min/[1.73_m2]    Calcium 8.6 8.5 - 10.1 mg/dL    Bilirubin Total 0.4 0.2 - 1.3 mg/dL    Albumin 4.0 3.4 - 5.0 g/dL    Protein Total 6.5 (L) 6.8 - 8.8 g/dL    Alkaline Phosphatase 64 40 - 150 U/L    ALT 21 0 - 70 U/L    AST 10 0 - 45 U/L   CBC with platelets differential     Status: Abnormal   Result Value Ref Range    WBC 2.8 (L) 4.0 - 11.0 10e9/L    RBC Count 3.43 (L) 4.4 - 5.9 10e12/L    Hemoglobin 12.3 (L) 13.3 - 17.7 g/dL    Hematocrit 35.2 (L) 40.0 - 53.0 %     (H) 78 - 100 fl    MCH 35.9 (H) 26.5 - 33.0 pg    MCHC 34.9 31.5 - 36.5 g/dL    RDW 13.9 10.0 - 15.0 %    Platelet Count 179 150 - 450 10e9/L    Diff Method Automated Method     % Neutrophils 74.3 %    % Lymphocytes 16.1 %    % Monocytes 6.4 %    % Eosinophils 2.1 %    % Basophils 0.7 %    % Immature Granulocytes 0.4 %    Nucleated RBCs 0 0 /100    Absolute Neutrophil 2.1 1.6 - 8.3 10e9/L    Absolute Lymphocytes 0.5 (L) 0.8 - 5.3 10e9/L    Absolute Monocytes 0.2 0.0 - 1.3 10e9/L    Absolute Eosinophils 0.1 0.0 - 0.7 10e9/L    Absolute Basophils 0.0 0.0 - 0.2 10e9/L    Abs Immature Granulocytes 0.0 0 - 0.4 10e9/L    Absolute Nucleated RBC 0.0        12/29/2020 Therapeutic LP  A Lumbar Puncture was performed in the Pediatric Sedation Suite. Informed  consent was obtained prior to the procedure. Lazaro Lnud was identified by facial recognition and ID arm band. A time-out was performed. Lazaro Lund was then placed in the left lateral decubitus position and the lumbosacral area was sterily prepped using Chloraprep followed by drape placement. Anatomic landmarks were identified by palpation. Then, a 22 gauge, 3.5 inch spinal needle was easily inserted into the L3-L4 interspace. On the first attempt approximately 3 mL of clear and colorless cerebrospinal fluid was obtained to be sent to the lab for cell count analysis and cytospin. Following that, 15mg of intrathecal Methotrexate in 6 mL of preservative-free normal saline was infused without resistance. The needle was removed and a Band-Aid applied. Lazaro Lund tolerated this procedure very well.    Signed,  Nicho Fischer   Pediatric Hematology/Oncology Fellow    Physician Attestation  I was present during the entire procedure. I saw and evaluated the patient. I discussed with the fellow and agree with the findings and plan as documented in the fellow's note.     Reynaldo Campuzano MD    Assessment:  Lazaro Lund is a 20 year old young man with T cell lymphoblastic lymphoma (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  He is being treated per [facility] protocol [id number]. He's had a CRu following Induction with a CR at the end of Consolidation. His course was complicated by extensive bilateral DVT and severe necrotizing pancreatitis requiring cessation of PEG-asparaginase from his treatment.  He comes to clinic today for Day 1 Cycle 4 of Maintenance therapy.     Overall, he has been doing well. He is currently on 37.5% full dose of 6MP with allopurinol for correction of skewed 6-MP metabolism and 100% methotrexate. ANC is above target range today, but was in range 1 month ago. If remains elevated with next check, will plan to escalate dose to 75mg daily (50% full dose of 6MP)    Plan:   1) Labs reviewed with Lazaro.  Continue  current doses of oral chemo.  2) Start prednisone burst.  3) Protonix during steroid bursts.  4) Most recent Vit D level was normal however Lazaro should continue on Vit D 1000IU daily  5) Bactrim for PCP prophylaxis.  6) Continue to check CMP and TGNs monthly moving forward.  7) Discussed with Lazaro that he will need COVID testing prior to LPs moving forward.  He prefers to come the day of, have it done in sedation and then come to clinic for chemo while he is waiting for results.   8) RTC in 4 weeks for D29 of Cycle 4     Signed,  Nicho Fischer   Pediatric Hematology/Oncology Fellow    Physician Attestation    I saw and evaluated the patient. I discussed with the fellow and agree with the findings and plan as documented in the fellow's note.     Reynaldo Campuzano MD  Pediatric Hematology/Oncology  [facility]

## 2020-12-31 LAB
APPEARANCE CSF: CLEAR
COLOR CSF: COLORLESS
RBC # CSF MANUAL: 0 /UL (ref 0–2)
TUBE # CSF: 1 #
WBC # CSF MANUAL: 0 /UL (ref 0–5)

## 2021-01-05 LAB
6-TGN ENTSUB RBC: 681 PMOL/8X10(8)RBC (ref 235–450)
6MMP ENTSUB RBC: <397 PMOL/8X10(8)RBC

## 2021-01-09 ENCOUNTER — HEALTH MAINTENANCE LETTER (OUTPATIENT)
Age: 21
End: 2021-01-09

## 2021-01-26 ENCOUNTER — OFFICE VISIT (OUTPATIENT)
Dept: PEDIATRIC HEMATOLOGY/ONCOLOGY | Facility: CLINIC | Age: 21
End: 2021-01-26
Attending: PEDIATRICS
Payer: COMMERCIAL

## 2021-01-26 ENCOUNTER — HOSPITAL ENCOUNTER (OUTPATIENT)
Dept: GENERAL RADIOLOGY | Facility: CLINIC | Age: 21
End: 2021-01-26
Attending: PEDIATRICS
Payer: COMMERCIAL

## 2021-01-26 ENCOUNTER — INFUSION THERAPY VISIT (OUTPATIENT)
Dept: INFUSION THERAPY | Facility: CLINIC | Age: 21
End: 2021-01-26
Attending: PEDIATRICS
Payer: COMMERCIAL

## 2021-01-26 VITALS
TEMPERATURE: 98.2 F | BODY MASS INDEX: 22.29 KG/M2 | DIASTOLIC BLOOD PRESSURE: 64 MMHG | SYSTOLIC BLOOD PRESSURE: 122 MMHG | HEIGHT: 73 IN | HEART RATE: 80 BPM | RESPIRATION RATE: 16 BRPM | OXYGEN SATURATION: 96 % | WEIGHT: 168.21 LBS

## 2021-01-26 VITALS — HEIGHT: 73 IN | BODY MASS INDEX: 22.26 KG/M2 | WEIGHT: 167.99 LBS

## 2021-01-26 DIAGNOSIS — C83.50 T LYMPHOBLASTIC LYMPHOMA (H): Primary | ICD-10-CM

## 2021-01-26 DIAGNOSIS — R07.1 CHEST PAIN ON BREATHING: ICD-10-CM

## 2021-01-26 DIAGNOSIS — C83.50 T LYMPHOBLASTIC LYMPHOMA (H): ICD-10-CM

## 2021-01-26 DIAGNOSIS — R07.1 CHEST PAIN ON BREATHING: Primary | ICD-10-CM

## 2021-01-26 LAB
ALBUMIN SERPL-MCNC: 4.3 G/DL (ref 3.4–5)
ALP SERPL-CCNC: 70 U/L (ref 40–150)
ALT SERPL W P-5'-P-CCNC: 23 U/L (ref 0–70)
ANION GAP SERPL CALCULATED.3IONS-SCNC: 4 MMOL/L (ref 3–14)
AST SERPL W P-5'-P-CCNC: 15 U/L (ref 0–45)
BASOPHILS # BLD AUTO: 0 10E9/L (ref 0–0.2)
BASOPHILS NFR BLD AUTO: 0.7 %
BILIRUB SERPL-MCNC: 0.8 MG/DL (ref 0.2–1.3)
BUN SERPL-MCNC: 16 MG/DL (ref 7–30)
CALCIUM SERPL-MCNC: 8.6 MG/DL (ref 8.5–10.1)
CHLORIDE SERPL-SCNC: 108 MMOL/L (ref 94–109)
CO2 SERPL-SCNC: 28 MMOL/L (ref 20–32)
CREAT SERPL-MCNC: 0.82 MG/DL (ref 0.66–1.25)
DIFFERENTIAL METHOD BLD: ABNORMAL
EOSINOPHIL # BLD AUTO: 0.1 10E9/L (ref 0–0.7)
EOSINOPHIL NFR BLD AUTO: 2.3 %
ERYTHROCYTE [DISTWIDTH] IN BLOOD BY AUTOMATED COUNT: 14 % (ref 10–15)
GFR SERPL CREATININE-BSD FRML MDRD: >90 ML/MIN/{1.73_M2}
GLUCOSE SERPL-MCNC: 83 MG/DL (ref 70–99)
HCT VFR BLD AUTO: 36 % (ref 40–53)
HGB BLD-MCNC: 12.4 G/DL (ref 13.3–17.7)
IMM GRANULOCYTES # BLD: 0 10E9/L (ref 0–0.4)
IMM GRANULOCYTES NFR BLD: 0 %
LYMPHOCYTES # BLD AUTO: 0.5 10E9/L (ref 0.8–5.3)
LYMPHOCYTES NFR BLD AUTO: 17.8 %
MCH RBC QN AUTO: 34.9 PG (ref 26.5–33)
MCHC RBC AUTO-ENTMCNC: 34.4 G/DL (ref 31.5–36.5)
MCV RBC AUTO: 101 FL (ref 78–100)
MONOCYTES # BLD AUTO: 0.2 10E9/L (ref 0–1.3)
MONOCYTES NFR BLD AUTO: 6.7 %
NEUTROPHILS # BLD AUTO: 2.2 10E9/L (ref 1.6–8.3)
NEUTROPHILS NFR BLD AUTO: 72.5 %
NRBC # BLD AUTO: 0 10*3/UL
NRBC BLD AUTO-RTO: 0 /100
PLATELET # BLD AUTO: 222 10E9/L (ref 150–450)
POTASSIUM SERPL-SCNC: 3.9 MMOL/L (ref 3.4–5.3)
PROT SERPL-MCNC: 6.9 G/DL (ref 6.8–8.8)
RBC # BLD AUTO: 3.55 10E12/L (ref 4.4–5.9)
SODIUM SERPL-SCNC: 140 MMOL/L (ref 133–144)
WBC # BLD AUTO: 3 10E9/L (ref 4–11)

## 2021-01-26 PROCEDURE — 96409 CHEMO IV PUSH SNGL DRUG: CPT

## 2021-01-26 PROCEDURE — G0463 HOSPITAL OUTPT CLINIC VISIT: HCPCS

## 2021-01-26 PROCEDURE — 71046 X-RAY EXAM CHEST 2 VIEWS: CPT

## 2021-01-26 PROCEDURE — 71046 X-RAY EXAM CHEST 2 VIEWS: CPT | Mod: 26 | Performed by: RADIOLOGY

## 2021-01-26 PROCEDURE — 85025 COMPLETE CBC W/AUTO DIFF WBC: CPT | Performed by: NURSE PRACTITIONER

## 2021-01-26 PROCEDURE — 250N000011 HC RX IP 250 OP 636: Performed by: NURSE PRACTITIONER

## 2021-01-26 PROCEDURE — 258N000003 HC RX IP 258 OP 636: Performed by: NURSE PRACTITIONER

## 2021-01-26 PROCEDURE — 258N000003 HC RX IP 258 OP 636: Performed by: PEDIATRICS

## 2021-01-26 PROCEDURE — G0463 HOSPITAL OUTPT CLINIC VISIT: HCPCS | Mod: 25

## 2021-01-26 PROCEDURE — 99215 OFFICE O/P EST HI 40 MIN: CPT | Performed by: PEDIATRICS

## 2021-01-26 PROCEDURE — 250N000011 HC RX IP 250 OP 636

## 2021-01-26 PROCEDURE — 80053 COMPREHEN METABOLIC PANEL: CPT | Performed by: NURSE PRACTITIONER

## 2021-01-26 PROCEDURE — 80299 QUANTITATIVE ASSAY DRUG: CPT | Performed by: NURSE PRACTITIONER

## 2021-01-26 RX ORDER — HEPARIN SODIUM (PORCINE) LOCK FLUSH IV SOLN 100 UNIT/ML 100 UNIT/ML
SOLUTION INTRAVENOUS
Status: COMPLETED
Start: 2021-01-26 | End: 2021-01-26

## 2021-01-26 RX ORDER — MERCAPTOPURINE 50 MG/1
TABLET ORAL
Qty: 36 TABLET | Refills: 2 | Status: SHIPPED | OUTPATIENT
Start: 2021-01-26 | End: 2021-01-28

## 2021-01-26 RX ADMIN — VINCRISTINE SULFATE 2 MG: 1 INJECTION, SOLUTION INTRAVENOUS at 13:17

## 2021-01-26 RX ADMIN — SODIUM CHLORIDE 50 ML: 9 INJECTION, SOLUTION INTRAVENOUS at 13:18

## 2021-01-26 RX ADMIN — HEPARIN 500 UNITS: 100 SYRINGE at 13:17

## 2021-01-26 ASSESSMENT — MIFFLIN-ST. JEOR
SCORE: 1811.13
SCORE: 1811.38

## 2021-01-26 ASSESSMENT — PAIN SCALES - GENERAL: PAINLEVEL: NO PAIN (0)

## 2021-01-26 NOTE — NURSING NOTE
"Chief Complaint   Patient presents with     RECHECK     Patient being seen for T Lymphoblastic Lymphoma follow-up     /64 (BP Location: Right arm, Patient Position: Sitting, Cuff Size: Adult Regular)   Pulse 80   Temp 98.2  F (36.8  C) (Oral)   Resp 16   Ht 1.845 m (6' 0.64\")   Wt 76.3 kg (168 lb 3.4 oz)   SpO2 96%   BMI 22.41 kg/m      No Pain (0)  Data Unavailable    I have reviewed the patients medications and allergies    Height/weight double check needed? No    Peds Outpatient BP  1) Rested for 5 minutes, BP taken on bare arm, patient sitting (or supine for infants) w/ legs uncrossed?   Yes  2) Right arm used?  Right arm   Yes  3) Arm circumference of largest part of upper arm (in cm): 30  4) BP cuff sized used: Adult (25-32cm)   If used different size cuff then what was recommended why? N/a  5) First BP reading:machine   BP Readings from Last 1 Encounters:   01/26/21 122/64      Is reading >90%?Yes   (90% for <1 years is 90/50)  (90% for >18 years is 140/90)  *If a machine BP is at or above 90% take manual BP  6) Manual BP reading: N/A  7) Other comments: None          Efrem Montejo LPN  January 26, 2021  "

## 2021-01-26 NOTE — LETTER
1/26/2021      RE: Lazaro Lund  [address] 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 Lazaro presented with acute onset cough and was found to have an anterior mediastinal mass and malignant left-sided pleural effusion.  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  He was admitted to [facility] oncology service 8/30 and started on treatment per [id number].  He had a pleural effusion that required a chest tube and a pericardial effusion that was not drained. His Induction was complicated by cardiac compression secondary to his mass leading to tamponade, this improved through out his hospital stay.  He also had dysphagia that improved with treatment of his mass, swallow study was normal. His course was complicated by extensive bilateral UE DVTs, and he was successfully treated with anticoagulation. His consolidation course was complicated by the development of severe asparaginase induced necrotizing pancreatitis. He became quite ill with SIRS and associated hypotension, he required pressor support in the ICU. As a result, asparaginase was eliminated from his treatment plan. He was in CR status at end of consolidation. During maintenance, he developed hepatotoxicity so allopurinol and dose reduced 6MP were started for correction of skewed 6-MP metabolism.  Lazaro comes to clinic today for Day 29 of his 4th Maintenance cycle.      HPI:   Since his last visit Lazaro has been doing well. His only concern today is about a pain he has developed in his left upper chest that radiates into his neck. He describes the pain as sharp in nature. It occurs typically with inspiration, but happens both at rest and with exertion. He has not felt short of breath, had fever, or cough associated with the pain. Other than this new pain, he has had good  appetite and energy with no paresthesias. He has not had any interval illnesses. He has not missed any doses of oral chemotherapy and has no side effects at this time. His appetite and energy level have been high. He is voiding and stooling without issue. He denies numbness or tingling in his hands or feet.     Review of systems:  The 10 point Review of Systems is negative other than noted in the HPI or here.      PMH:   Past Medical History:   Diagnosis Date     Acute necrotizing pancreatitis 11/07/2019    attributed to asparaginase     Acute pancreatitis due to PEGaspariginase therapy  11/15/2019     DVT of upper extremity (deep vein thrombosis) (H) 09/26/2019    Bilateral      Edema of upper extremity 10/17/2019     Folliculitis 10/17/2019     Migraine 2006    have resolved     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     -- Spinal HA following LP  -- TPMT shows Intermediate activity with normal TPMT/NUDT15 genotype  -- Factor V leiden and prothrombin gene mutation negative  -- Necrotizing pancreatitis secondary to asparaginase  -- former smoke, quit with the use of nicotine patches. Does smoke marijuana daily, working on decreasing frequency     PFMH:   Family History   Problem Relation Age of Onset     No Known Problems Mother      No Known Problems Father      Asthma Brother      Thyroid Cancer Paternal Grandmother      Melanoma Paternal Aunt        Social History:   Social History     Social History Narrative    Lazaro previously lived at home with his dad and step mom in [place] but is now staying with his grandma in [place]. Biological mother is involved in his life. Has 4 step-siblings and 1 biological sibling. 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 Has completed high school. 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  He has been enjoying fishing and video games.         Current Medications:  Current Outpatient Medications on File Prior to Visit   Medication Sig Dispense Refill     allopurinol (ZYLOPRIM) 100 MG tablet Take 1 tablet (100 mg) by mouth daily 30 tablet 11     diphenhydrAMINE (BENADRYL) 25 MG capsule Take 1-2 capsules (25-50 mg) by mouth every 6 hours as needed (Breakthrough Nausea and Vomiting ) 30 capsule 1     lidocaine-prilocaine (EMLA) 2.5-2.5 % external cream Apply topically as needed for other (prior to port access) 30 g 6     mercaptopurine (PURINETHOL) 50 MG tablet Take 50mg (1 tablet) five days/week and 75mg (1.5 tablets) two days/week. 36 tablet 2     methotrexate 2.5 MG tablet Take 16 tablets (40 mg) by mouth once a week Do not take on Days of LP. 64 tablet 2     ondansetron (ZOFRAN-ODT) 8 MG ODT tab Take 1 tablet (8 mg) by mouth every 8 hours as needed for nausea 20 tablet 6     pantoprazole (PROTONIX) 40 MG EC tablet Take 1 tablet (40 mg) by mouth every morning (before breakfast) During weeks of taking prednisone. 30 tablet 2     polyethylene glycol (MIRALAX/GLYCOLAX) packet Take 17 g by mouth daily 30 packet 0     predniSONE (DELTASONE) 10 MG tablet Take 40mg (4 tabs) twice dialy for 5 days every 4 weeks. 40 tablet 2     sennosides (SENOKOT) 8.6 MG tablet Take 2 tablets by mouth 2 times daily as needed for constipation 60 each 1     sulfamethoxazole-trimethoprim (BACTRIM DS) 800-160 MG tablet Take 1 tablet by mouth Every Mon, Tues two times daily 16 tablet 11     Vitamin D, Cholecalciferol, 25 MCG (1000 UT) TABS Take 1 tablet (1,000 Units) by mouth daily 30 tablet 3     Reviewed medications with Lazaro.  Not taking ativan or scopolamine.  Confirmed oral chemo doses, has not missed any doses. He is not taking Vitamin D currently.  Received [id number] influenza vaccine on 12/22/20     Physical Exam:   /64 (BP Location: Right arm, Patient Position: Sitting, Cuff Size: Adult Regular)   Pulse 80   Temp 98.2  F (36.8  C) (Oral)   Resp 16   Ht  "1.845 m (6' 0.64\")   Wt 76.3 kg (168 lb 3.4 oz)   SpO2 96%   BMI 22.41 kg/m        Wt Readings from Last 4 Encounters:   01/26/21 76.3 kg (168 lb 3.4 oz)   12/29/20 79.4 kg (175 lb 0.7 oz)   12/01/20 79 kg (174 lb 2.6 oz)   11/03/20 80.7 kg (177 lb 14.6 oz)     Ht Readings from Last 2 Encounters:   01/26/21 1.845 m (6' 0.64\")   12/29/20 1.845 m (6' 0.64\")     General: Lazaro is alert, interactive and appropriate, well-appearing, in no distress  HEENT: Skull is atrauamatic and normocephalic.  Full head of hair. PERRLA, sclera are non icteric and not injected, EOM are intact. Nares are patent without drainage. Oropharynx clear, no plaques noted. Buccal mucosa and tongue clear. MMM.  Neck:  Supple without lymphadenopathy.   Lymph: There is no cervical, supraclavicular, axillary, lymphadenopathy palpated.  Cardiovascular:  HR is regular, S1, S2 no murmur.  Capillary refill is < 2 seconds.   Respiratory: No cough noted. Breathing effortlessly. Lungs are clear to auscultation throughout.  No crackles or wheezes.  Gastrointestinal: BS present in all quadrants.  Abdomen is soft and flat.  No pain with palpation. No hepatosplenomegaly or masses are palpated.  Skin: No concerning lesions  Neurological:  CN 2-12 grossly intact. Gait is normal.  No issues with balance. Sensation intact in hands and feet.     Musculoskeletal:  Palpation of the upper left chest, neck and shoulder failed to elicit pain described by the patient; Good strength and ROM in all extremities.  Strong dorsiflexion at ankles and great toes (5/5) bilaterally without any pain at the Achilles.     Labs:   The following tests were ordered and interpreted by me today:  -- CBC with differential  -- CMP  -- Chest x-ray    Results for orders placed or performed during the hospital encounter of 01/26/21   X-ray Chest 2 vws*     Status: None    Narrative    EXAMINATION:  XR CHEST 2 VW 1/26/2021 1:40 PM.    COMPARISON: 10/6/2020.    HISTORY:  21 yo with T cell " lymphoma currently receiving chemotherapy  with new onset left chest pain with inspiration. Please evaluate for  infection.; Chest pain on breathing; T lymphoblastic lymphoma (H)    FINDINGS: PA and lateral views of the chest. Right IJ Port-A-Cath tip  projects over the mid SVC.. Midline trachea Cardiomediastinal  silhouette and pulmonary vasculature are within normal limits. No  focal pulmonary opacity. No pleural effusion or pneumothorax. Upper  abdomen and osseous structures are within normal limits.      Impression    IMPRESSION: No focal airspace opacity.    I have personally reviewed the examination and initial interpretation  and I agree with the findings.    RAJI ACUNA MD   Results for orders placed or performed in visit on 01/26/21   CBC with platelets differential     Status: Abnormal   Result Value Ref Range    WBC 3.0 (L) 4.0 - 11.0 10e9/L    RBC Count 3.55 (L) 4.4 - 5.9 10e12/L    Hemoglobin 12.4 (L) 13.3 - 17.7 g/dL    Hematocrit 36.0 (L) 40.0 - 53.0 %     (H) 78 - 100 fl    MCH 34.9 (H) 26.5 - 33.0 pg    MCHC 34.4 31.5 - 36.5 g/dL    RDW 14.0 10.0 - 15.0 %    Platelet Count 222 150 - 450 10e9/L    Diff Method Automated Method     % Neutrophils 72.5 %    % Lymphocytes 17.8 %    % Monocytes 6.7 %    % Eosinophils 2.3 %    % Basophils 0.7 %    % Immature Granulocytes 0.0 %    Nucleated RBCs 0 0 /100    Absolute Neutrophil 2.2 1.6 - 8.3 10e9/L    Absolute Lymphocytes 0.5 (L) 0.8 - 5.3 10e9/L    Absolute Monocytes 0.2 0.0 - 1.3 10e9/L    Absolute Eosinophils 0.1 0.0 - 0.7 10e9/L    Absolute Basophils 0.0 0.0 - 0.2 10e9/L    Abs Immature Granulocytes 0.0 0 - 0.4 10e9/L    Absolute Nucleated RBC 0.0    Comprehensive metabolic panel     Status: None   Result Value Ref Range    Sodium 140 133 - 144 mmol/L    Potassium 3.9 3.4 - 5.3 mmol/L    Chloride 108 94 - 109 mmol/L    Carbon Dioxide 28 20 - 32 mmol/L    Anion Gap 4 3 - 14 mmol/L    Glucose 83 70 - 99 mg/dL    Urea Nitrogen 16 7 - 30 mg/dL     Creatinine 0.82 0.66 - 1.25 mg/dL    GFR Estimate >90 >60 mL/min/[1.73_m2]    GFR Estimate If Black >90 >60 mL/min/[1.73_m2]    Calcium 8.6 8.5 - 10.1 mg/dL    Bilirubin Total 0.8 0.2 - 1.3 mg/dL    Albumin 4.3 3.4 - 5.0 g/dL    Protein Total 6.9 6.8 - 8.8 g/dL    Alkaline Phosphatase 70 40 - 150 U/L    ALT 23 0 - 70 U/L    AST 15 0 - 45 U/L       Assessment:  Lazaro Lund is a 20 year old young man with T cell lymphoblastic lymphoma (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  He is being treated per [facility] protocol [id number]. He's had a CRu following Induction with a CR at the end of Consolidation. His course was complicated by extensive bilateral DVT and severe necrotizing pancreatitis requiring cessation of PEG-asparaginase from his treatment.  He comes to clinic today for Day 29 Cycle 4 of Maintenance therapy.     Overall, he has been doing well. He is currently on 37.5% full dose of 6MP with allopurinol for correction of skewed 6-MP metabolism and 100% methotrexate. ANC is above target range again today, now 2 consecutive checks above target range, so 6MP will be further escalated. No concerns on chest x-ray for infectious etiology for upper chest pain.    Plan:   1) Labs reviewed with Lazaro. Increase 6MP from 50 mg 5 days/week and 75 mg 2 days/week to 50 mg 3 days/week and 75 mg 4 days/week (12.5% increase from prior dose)  2) Start prednisone burst.  3) Protonix during steroid bursts.  4) Most recent Vit D level was normal however Lazaro should continue on Vit D 1000IU daily  5) Bactrim for PCP prophylaxis.  6) Continue to check CMP and TGNs monthly moving forward. TGNs appropriate at D1 and pending from today.  7) Discussed with Lazaro that he will need COVID testing prior to LPs moving forward.  He prefers to come the day of, have it done in sedation and then come to clinic for chemo while he is waiting for results.   8) RTC in 4 weeks for D57 of Cycle 4 including LP with IT chemo (make-up from missed dose earlier in  therapy), vincristine, lab monitoring and exam    Reynaldo Campuzano MD  Pediatric Hematology/Oncology  [facility]  Pager [phone]    Total time spent on the following services on the date of the encounter:  -- Ordering medications, test, procedures, chemotherapy  -- Interpretation of labs and imaging    -- Performing a medically appropriate examination  -- Counseling and educating the patient/family/caregiver  -- Documenting clinical information in the electronic  -- Communicating results to the patient/family/caregiver  -- Care coordination  -- Total time spent: 40 minutes      Reynaldo Campuzano MD

## 2021-01-26 NOTE — PROGRESS NOTES
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 Lazaro presented with acute onset cough and was found to have an anterior mediastinal mass and malignant left-sided pleural effusion.  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  He was admitted to [facility] oncology service 8/30 and started on treatment per [id number].  He had a pleural effusion that required a chest tube and a pericardial effusion that was not drained. His Induction was complicated by cardiac compression secondary to his mass leading to tamponade, this improved through out his hospital stay.  He also had dysphagia that improved with treatment of his mass, swallow study was normal. His course was complicated by extensive bilateral UE DVTs, and he was successfully treated with anticoagulation. His consolidation course was complicated by the development of severe asparaginase induced necrotizing pancreatitis. He became quite ill with SIRS and associated hypotension, he required pressor support in the ICU. As a result, asparaginase was eliminated from his treatment plan. He was in CR status at end of consolidation. During maintenance, he developed hepatotoxicity so allopurinol and dose reduced 6MP were started for correction of skewed 6-MP metabolism.  Lazaro comes to clinic today for Day 29 of his 4th Maintenance cycle.      HPI:   Since his last visit Lazaro has been doing well. His only concern today is about a pain he has developed in his left upper chest that radiates into his neck. He describes the pain as sharp in nature. It occurs typically with inspiration, but happens both at rest and with exertion. He has not felt short of breath, had fever, or cough associated with the pain. Other than this new pain, he has had good appetite and energy with no paresthesias. He has not had any interval illnesses. He has not  missed any doses of oral chemotherapy and has no side effects at this time. His appetite and energy level have been high. He is voiding and stooling without issue. He denies numbness or tingling in his hands or feet.     Review of systems:  The 10 point Review of Systems is negative other than noted in the HPI or here.      PMH:   Past Medical History:   Diagnosis Date     Acute necrotizing pancreatitis 11/07/2019    attributed to asparaginase     Acute pancreatitis due to PEGaspariginase therapy  11/15/2019     DVT of upper extremity (deep vein thrombosis) (H) 09/26/2019    Bilateral      Edema of upper extremity 10/17/2019     Folliculitis 10/17/2019     Migraine 2006    have resolved     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     -- Spinal HA following LP  -- TPMT shows Intermediate activity with normal TPMT/NUDT15 genotype  -- Factor V leiden and prothrombin gene mutation negative  -- Necrotizing pancreatitis secondary to asparaginase  -- former smoke, quit with the use of nicotine patches. Does smoke marijuana daily, working on decreasing frequency     PFMH:   Family History   Problem Relation Age of Onset     No Known Problems Mother      No Known Problems Father      Asthma Brother      Thyroid Cancer Paternal Grandmother      Melanoma Paternal Aunt        Social History:   Social History     Social History Narrative    Lazaro previously lived at home with his dad and step mom in [place] but is now staying with his grandma in [place]. Biological mother is involved in his life. Has 4 step-siblings and 1 biological sibling. 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 Has completed high school. 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  He has been enjoying fishing and video games.        Current Medications:  Current Outpatient Medications on File Prior to Visit   Medication Sig  "Dispense Refill     allopurinol (ZYLOPRIM) 100 MG tablet Take 1 tablet (100 mg) by mouth daily 30 tablet 11     diphenhydrAMINE (BENADRYL) 25 MG capsule Take 1-2 capsules (25-50 mg) by mouth every 6 hours as needed (Breakthrough Nausea and Vomiting ) 30 capsule 1     lidocaine-prilocaine (EMLA) 2.5-2.5 % external cream Apply topically as needed for other (prior to port access) 30 g 6     mercaptopurine (PURINETHOL) 50 MG tablet Take 50mg (1 tablet) five days/week and 75mg (1.5 tablets) two days/week. 36 tablet 2     methotrexate 2.5 MG tablet Take 16 tablets (40 mg) by mouth once a week Do not take on Days of LP. 64 tablet 2     ondansetron (ZOFRAN-ODT) 8 MG ODT tab Take 1 tablet (8 mg) by mouth every 8 hours as needed for nausea 20 tablet 6     pantoprazole (PROTONIX) 40 MG EC tablet Take 1 tablet (40 mg) by mouth every morning (before breakfast) During weeks of taking prednisone. 30 tablet 2     polyethylene glycol (MIRALAX/GLYCOLAX) packet Take 17 g by mouth daily 30 packet 0     predniSONE (DELTASONE) 10 MG tablet Take 40mg (4 tabs) twice dialy for 5 days every 4 weeks. 40 tablet 2     sennosides (SENOKOT) 8.6 MG tablet Take 2 tablets by mouth 2 times daily as needed for constipation 60 each 1     sulfamethoxazole-trimethoprim (BACTRIM DS) 800-160 MG tablet Take 1 tablet by mouth Every Mon, Tues two times daily 16 tablet 11     Vitamin D, Cholecalciferol, 25 MCG (1000 UT) TABS Take 1 tablet (1,000 Units) by mouth daily 30 tablet 3     Reviewed medications with Lazaro.  Not taking ativan or scopolamine.  Confirmed oral chemo doses, has not missed any doses. He is not taking Vitamin D currently.  Received [id number] influenza vaccine on 12/22/20     Physical Exam:   /64 (BP Location: Right arm, Patient Position: Sitting, Cuff Size: Adult Regular)   Pulse 80   Temp 98.2  F (36.8  C) (Oral)   Resp 16   Ht 1.845 m (6' 0.64\")   Wt 76.3 kg (168 lb 3.4 oz)   SpO2 96%   BMI 22.41 kg/m        Wt Readings from " "Last 4 Encounters:   01/26/21 76.3 kg (168 lb 3.4 oz)   12/29/20 79.4 kg (175 lb 0.7 oz)   12/01/20 79 kg (174 lb 2.6 oz)   11/03/20 80.7 kg (177 lb 14.6 oz)     Ht Readings from Last 2 Encounters:   01/26/21 1.845 m (6' 0.64\")   12/29/20 1.845 m (6' 0.64\")     General: Lazaro is alert, interactive and appropriate, well-appearing, in no distress  HEENT: Skull is atrauamatic and normocephalic.  Full head of hair. PERRLA, sclera are non icteric and not injected, EOM are intact. Nares are patent without drainage. Oropharynx clear, no plaques noted. Buccal mucosa and tongue clear. MMM.  Neck:  Supple without lymphadenopathy.   Lymph: There is no cervical, supraclavicular, axillary, lymphadenopathy palpated.  Cardiovascular:  HR is regular, S1, S2 no murmur.  Capillary refill is < 2 seconds.   Respiratory: No cough noted. Breathing effortlessly. Lungs are clear to auscultation throughout.  No crackles or wheezes.  Gastrointestinal: BS present in all quadrants.  Abdomen is soft and flat.  No pain with palpation. No hepatosplenomegaly or masses are palpated.  Skin: No concerning lesions  Neurological:  CN 2-12 grossly intact. Gait is normal.  No issues with balance. Sensation intact in hands and feet.     Musculoskeletal:  Palpation of the upper left chest, neck and shoulder failed to elicit pain described by the patient; Good strength and ROM in all extremities.  Strong dorsiflexion at ankles and great toes (5/5) bilaterally without any pain at the Achilles.     Labs:   The following tests were ordered and interpreted by me today:  -- CBC with differential  -- CMP  -- Chest x-ray    Results for orders placed or performed during the hospital encounter of 01/26/21   X-ray Chest 2 vws*     Status: None    Narrative    EXAMINATION:  XR CHEST 2 VW 1/26/2021 1:40 PM.    COMPARISON: 10/6/2020.    HISTORY:  21 yo with T cell lymphoma currently receiving chemotherapy  with new onset left chest pain with inspiration. Please " evaluate for  infection.; Chest pain on breathing; T lymphoblastic lymphoma (H)    FINDINGS: PA and lateral views of the chest. Right IJ Port-A-Cath tip  projects over the mid SVC.. Midline trachea Cardiomediastinal  silhouette and pulmonary vasculature are within normal limits. No  focal pulmonary opacity. No pleural effusion or pneumothorax. Upper  abdomen and osseous structures are within normal limits.      Impression    IMPRESSION: No focal airspace opacity.    I have personally reviewed the examination and initial interpretation  and I agree with the findings.    RAJI ACUNA MD   Results for orders placed or performed in visit on 01/26/21   CBC with platelets differential     Status: Abnormal   Result Value Ref Range    WBC 3.0 (L) 4.0 - 11.0 10e9/L    RBC Count 3.55 (L) 4.4 - 5.9 10e12/L    Hemoglobin 12.4 (L) 13.3 - 17.7 g/dL    Hematocrit 36.0 (L) 40.0 - 53.0 %     (H) 78 - 100 fl    MCH 34.9 (H) 26.5 - 33.0 pg    MCHC 34.4 31.5 - 36.5 g/dL    RDW 14.0 10.0 - 15.0 %    Platelet Count 222 150 - 450 10e9/L    Diff Method Automated Method     % Neutrophils 72.5 %    % Lymphocytes 17.8 %    % Monocytes 6.7 %    % Eosinophils 2.3 %    % Basophils 0.7 %    % Immature Granulocytes 0.0 %    Nucleated RBCs 0 0 /100    Absolute Neutrophil 2.2 1.6 - 8.3 10e9/L    Absolute Lymphocytes 0.5 (L) 0.8 - 5.3 10e9/L    Absolute Monocytes 0.2 0.0 - 1.3 10e9/L    Absolute Eosinophils 0.1 0.0 - 0.7 10e9/L    Absolute Basophils 0.0 0.0 - 0.2 10e9/L    Abs Immature Granulocytes 0.0 0 - 0.4 10e9/L    Absolute Nucleated RBC 0.0    Comprehensive metabolic panel     Status: None   Result Value Ref Range    Sodium 140 133 - 144 mmol/L    Potassium 3.9 3.4 - 5.3 mmol/L    Chloride 108 94 - 109 mmol/L    Carbon Dioxide 28 20 - 32 mmol/L    Anion Gap 4 3 - 14 mmol/L    Glucose 83 70 - 99 mg/dL    Urea Nitrogen 16 7 - 30 mg/dL    Creatinine 0.82 0.66 - 1.25 mg/dL    GFR Estimate >90 >60 mL/min/[1.73_m2]    GFR Estimate If Black  >90 >60 mL/min/[1.73_m2]    Calcium 8.6 8.5 - 10.1 mg/dL    Bilirubin Total 0.8 0.2 - 1.3 mg/dL    Albumin 4.3 3.4 - 5.0 g/dL    Protein Total 6.9 6.8 - 8.8 g/dL    Alkaline Phosphatase 70 40 - 150 U/L    ALT 23 0 - 70 U/L    AST 15 0 - 45 U/L       Assessment:  Lazaro Lund is a 20 year old young man with T cell lymphoblastic lymphoma (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  He is being treated per [facility] protocol [id number]. He's had a CRu following Induction with a CR at the end of Consolidation. His course was complicated by extensive bilateral DVT and severe necrotizing pancreatitis requiring cessation of PEG-asparaginase from his treatment.  He comes to clinic today for Day 29 Cycle 4 of Maintenance therapy.     Overall, he has been doing well. He is currently on 37.5% full dose of 6MP with allopurinol for correction of skewed 6-MP metabolism and 100% methotrexate. ANC is above target range again today, now 2 consecutive checks above target range, so 6MP will be further escalated. No concerns on chest x-ray for infectious etiology for upper chest pain.    Plan:   1) Labs reviewed with Lazaro. Increase 6MP from 50 mg 5 days/week and 75 mg 2 days/week to 50 mg 3 days/week and 75 mg 4 days/week (12.5% increase from prior dose)  2) Start prednisone burst.  3) Protonix during steroid bursts.  4) Most recent Vit D level was normal however Lazaro should continue on Vit D 1000IU daily  5) Bactrim for PCP prophylaxis.  6) Continue to check CMP and TGNs monthly moving forward. TGNs appropriate at D1 and pending from today.  7) Discussed with Lazaro that he will need COVID testing prior to LPs moving forward.  He prefers to come the day of, have it done in sedation and then come to clinic for chemo while he is waiting for results.   8) RTC in 4 weeks for D57 of Cycle 4 including LP with IT chemo (make-up from missed dose earlier in therapy), vincristine, lab monitoring and exam    Reynaldo Campuzano MD  Pediatric  Hematology/Oncology  [facility]  Pager [phone]    Total time spent on the following services on the date of the encounter:  -- Ordering medications, test, procedures, chemotherapy  -- Interpretation of labs and imaging    -- Performing a medically appropriate examination  -- Counseling and educating the patient/family/caregiver  -- Documenting clinical information in the electronic  -- Communicating results to the patient/family/caregiver  -- Care coordination  -- Total time spent: 40 minutes    Addendum: Lazaro's 6MP prescription was filled before his counts were obtained, so it did not reflect the increase in his dosing. A prescription was sent to the pharmacy for 4 additional pills to make-up for what he was not given on Tuesday. Lazaro was notified that he will need to  this prescription in the next few weeks to ensure he has enough medication for the next 4 weeks.

## 2021-01-26 NOTE — PROGRESS NOTES
Infusion Nursing Note    Lazaro PLUNKETT Kalerobbin Presents to [facility] today for: Vincristine    Due to : T lymphoblastic lymphoma (H)    Intravenous Access/Labs: Port accessed using sterile technique; + blood return noted and labs drawn.     Coping:   Child Family Life declined    Infusion Note: Pt arrived to clinic and denies any recent fever/infections; no new issues/concerns. Pt seen and assessed by Dr. Campuzano and ok to proceed with treatment. Vincristine given by gravity as ordered; + blood return noted pre/mid/post infusion. Port heparin locked at end of infusion. Stable pt left clinic.

## 2021-01-28 RX ORDER — MERCAPTOPURINE 50 MG/1
TABLET ORAL
Qty: 4 TABLET | Refills: 0 | Status: ON HOLD | OUTPATIENT
Start: 2021-01-28 | End: 2021-02-23

## 2021-01-30 LAB
6-TGN ENTSUB RBC: 854 PMOL/8X10(8)RBC (ref 235–450)
6MMP ENTSUB RBC: <413 PMOL/8X10(8)RBC

## 2021-02-06 DIAGNOSIS — Z11.59 ENCOUNTER FOR SCREENING FOR OTHER VIRAL DISEASES: Primary | ICD-10-CM

## 2021-02-22 ENCOUNTER — ANESTHESIA EVENT (OUTPATIENT)
Dept: PEDIATRICS | Facility: CLINIC | Age: 21
End: 2021-02-22
Payer: COMMERCIAL

## 2021-02-22 ENCOUNTER — TELEPHONE (OUTPATIENT)
Dept: PEDIATRIC HEMATOLOGY/ONCOLOGY | Facility: CLINIC | Age: 21
End: 2021-02-22

## 2021-02-22 NOTE — TELEPHONE ENCOUNTER
Attempted to call the patient/guardian to complete a wellness screening but was unable to reach them. A message was left on the voicemail asking them call the clinic.      February 22, 2021  Efrem Montejo LPN

## 2021-02-23 ENCOUNTER — ANESTHESIA (OUTPATIENT)
Dept: PEDIATRICS | Facility: CLINIC | Age: 21
End: 2021-02-23
Payer: COMMERCIAL

## 2021-02-23 ENCOUNTER — HOSPITAL ENCOUNTER (OUTPATIENT)
Facility: CLINIC | Age: 21
Discharge: HOME | End: 2021-02-23
Attending: PEDIATRICS | Admitting: PEDIATRICS
Payer: COMMERCIAL

## 2021-02-23 ENCOUNTER — OFFICE VISIT (OUTPATIENT)
Dept: PEDIATRIC HEMATOLOGY/ONCOLOGY | Facility: CLINIC | Age: 21
End: 2021-02-23
Attending: NURSE PRACTITIONER
Payer: COMMERCIAL

## 2021-02-23 ENCOUNTER — INFUSION THERAPY VISIT (OUTPATIENT)
Dept: INFUSION THERAPY | Facility: CLINIC | Age: 21
End: 2021-02-23
Attending: PEDIATRICS
Payer: COMMERCIAL

## 2021-02-23 ENCOUNTER — OFFICE VISIT (OUTPATIENT)
Dept: PEDIATRIC HEMATOLOGY/ONCOLOGY | Facility: CLINIC | Age: 21
End: 2021-02-23
Attending: PEDIATRICS
Payer: COMMERCIAL

## 2021-02-23 VITALS
RESPIRATION RATE: 18 BRPM | OXYGEN SATURATION: 100 % | HEART RATE: 68 BPM | DIASTOLIC BLOOD PRESSURE: 50 MMHG | TEMPERATURE: 98.1 F | BODY MASS INDEX: 22.26 KG/M2 | WEIGHT: 167.99 LBS | SYSTOLIC BLOOD PRESSURE: 95 MMHG | HEIGHT: 73 IN

## 2021-02-23 VITALS
HEIGHT: 72 IN | RESPIRATION RATE: 16 BRPM | OXYGEN SATURATION: 99 % | SYSTOLIC BLOOD PRESSURE: 109 MMHG | HEART RATE: 79 BPM | BODY MASS INDEX: 22.84 KG/M2 | DIASTOLIC BLOOD PRESSURE: 66 MMHG | WEIGHT: 168.65 LBS | TEMPERATURE: 98 F

## 2021-02-23 DIAGNOSIS — C83.50 T LYMPHOBLASTIC LYMPHOMA (H): ICD-10-CM

## 2021-02-23 DIAGNOSIS — C83.50 T LYMPHOBLASTIC LYMPHOMA (H): Primary | ICD-10-CM

## 2021-02-23 LAB
ALBUMIN SERPL-MCNC: 4.2 G/DL (ref 3.4–5)
ALP SERPL-CCNC: 69 U/L (ref 40–150)
ALT SERPL W P-5'-P-CCNC: 21 U/L (ref 0–70)
ANION GAP SERPL CALCULATED.3IONS-SCNC: 7 MMOL/L (ref 3–14)
AST SERPL W P-5'-P-CCNC: 12 U/L (ref 0–45)
BASOPHILS # BLD AUTO: 0 10E9/L (ref 0–0.2)
BASOPHILS NFR BLD AUTO: 0.9 %
BILIRUB SERPL-MCNC: 0.8 MG/DL (ref 0.2–1.3)
BUN SERPL-MCNC: 16 MG/DL (ref 7–30)
CALCIUM SERPL-MCNC: 8.9 MG/DL (ref 8.5–10.1)
CHLORIDE SERPL-SCNC: 109 MMOL/L (ref 94–109)
CO2 SERPL-SCNC: 24 MMOL/L (ref 20–32)
CREAT SERPL-MCNC: 0.62 MG/DL (ref 0.66–1.25)
DIFFERENTIAL METHOD BLD: ABNORMAL
EOSINOPHIL # BLD AUTO: 0.1 10E9/L (ref 0–0.7)
EOSINOPHIL NFR BLD AUTO: 3.5 %
ERYTHROCYTE [DISTWIDTH] IN BLOOD BY AUTOMATED COUNT: 14.2 % (ref 10–15)
GFR SERPL CREATININE-BSD FRML MDRD: >90 ML/MIN/{1.73_M2}
GLUCOSE SERPL-MCNC: 93 MG/DL (ref 70–99)
HCT VFR BLD AUTO: 33.2 % (ref 40–53)
HGB BLD-MCNC: 12 G/DL (ref 13.3–17.7)
IMM GRANULOCYTES # BLD: 0 10E9/L (ref 0–0.4)
IMM GRANULOCYTES NFR BLD: 0 %
LABORATORY COMMENT REPORT: NORMAL
LYMPHOCYTES # BLD AUTO: 0.5 10E9/L (ref 0.8–5.3)
LYMPHOCYTES NFR BLD AUTO: 20.6 %
MCH RBC QN AUTO: 35.6 PG (ref 26.5–33)
MCHC RBC AUTO-ENTMCNC: 36.1 G/DL (ref 31.5–36.5)
MCV RBC AUTO: 99 FL (ref 78–100)
MONOCYTES # BLD AUTO: 0.1 10E9/L (ref 0–1.3)
MONOCYTES NFR BLD AUTO: 6.1 %
NEUTROPHILS # BLD AUTO: 1.6 10E9/L (ref 1.6–8.3)
NEUTROPHILS NFR BLD AUTO: 68.9 %
NRBC # BLD AUTO: 0 10*3/UL
NRBC BLD AUTO-RTO: 0 /100
PLATELET # BLD AUTO: 202 10E9/L (ref 150–450)
POTASSIUM SERPL-SCNC: 4 MMOL/L (ref 3.4–5.3)
PROT SERPL-MCNC: 6.5 G/DL (ref 6.8–8.8)
RBC # BLD AUTO: 3.37 10E12/L (ref 4.4–5.9)
SARS-COV-2 RNA RESP QL NAA+PROBE: NEGATIVE
SODIUM SERPL-SCNC: 140 MMOL/L (ref 133–144)
SPECIMEN SOURCE: NORMAL
WBC # BLD AUTO: 2.3 10E9/L (ref 4–11)

## 2021-02-23 PROCEDURE — 250N000011 HC RX IP 250 OP 636: Performed by: NURSE PRACTITIONER

## 2021-02-23 PROCEDURE — 99207 PR NO BILLABLE SERVICE THIS VISIT: CPT | Performed by: NURSE PRACTITIONER

## 2021-02-23 PROCEDURE — G0463 HOSPITAL OUTPT CLINIC VISIT: HCPCS | Mod: 25 | Performed by: NURSE PRACTITIONER

## 2021-02-23 PROCEDURE — 250N000011 HC RX IP 250 OP 636

## 2021-02-23 PROCEDURE — 999N000141 HC STATISTIC PRE-PROCEDURE NURSING ASSESSMENT: Performed by: NURSE PRACTITIONER

## 2021-02-23 PROCEDURE — 62270 DX LMBR SPI PNXR: CPT

## 2021-02-23 PROCEDURE — 258N000003 HC RX IP 258 OP 636: Performed by: NURSE PRACTITIONER

## 2021-02-23 PROCEDURE — 36591 DRAW BLOOD OFF VENOUS DEVICE: CPT | Performed by: NURSE PRACTITIONER

## 2021-02-23 PROCEDURE — 258N000003 HC RX IP 258 OP 636: Performed by: PEDIATRICS

## 2021-02-23 PROCEDURE — 370N000017 HC ANESTHESIA TECHNICAL FEE, PER MIN: Performed by: NURSE PRACTITIONER

## 2021-02-23 PROCEDURE — 99215 OFFICE O/P EST HI 40 MIN: CPT | Mod: 25 | Performed by: NURSE PRACTITIONER

## 2021-02-23 PROCEDURE — 250N000011 HC RX IP 250 OP 636: Performed by: NURSE ANESTHETIST, CERTIFIED REGISTERED

## 2021-02-23 PROCEDURE — 85025 COMPLETE CBC W/AUTO DIFF WBC: CPT | Performed by: NURSE PRACTITIONER

## 2021-02-23 PROCEDURE — 999N000131 HC STATISTIC POST-PROCEDURE RECOVERY CARE: Performed by: NURSE PRACTITIONER

## 2021-02-23 PROCEDURE — 250N000011 HC RX IP 250 OP 636: Performed by: PEDIATRICS

## 2021-02-23 PROCEDURE — 250N000009 HC RX 250

## 2021-02-23 PROCEDURE — 258N000003 HC RX IP 258 OP 636: Performed by: NURSE ANESTHETIST, CERTIFIED REGISTERED

## 2021-02-23 PROCEDURE — 250N000011 HC RX IP 250 OP 636: Performed by: ANESTHESIOLOGY

## 2021-02-23 PROCEDURE — 87635 SARS-COV-2 COVID-19 AMP PRB: CPT | Performed by: PEDIATRICS

## 2021-02-23 PROCEDURE — 96409 CHEMO IV PUSH SNGL DRUG: CPT

## 2021-02-23 PROCEDURE — 80053 COMPREHEN METABOLIC PANEL: CPT | Performed by: NURSE PRACTITIONER

## 2021-02-23 PROCEDURE — 89050 BODY FLUID CELL COUNT: CPT | Performed by: PEDIATRICS

## 2021-02-23 PROCEDURE — 80299 QUANTITATIVE ASSAY DRUG: CPT | Performed by: NURSE PRACTITIONER

## 2021-02-23 PROCEDURE — 250N000009 HC RX 250: Performed by: NURSE ANESTHETIST, CERTIFIED REGISTERED

## 2021-02-23 RX ORDER — HEPARIN SODIUM,PORCINE 10 UNIT/ML
VIAL (ML) INTRAVENOUS
Status: COMPLETED
Start: 2021-02-23 | End: 2021-02-23

## 2021-02-23 RX ORDER — LIDOCAINE 40 MG/G
CREAM TOPICAL
Status: COMPLETED
Start: 2021-02-23 | End: 2021-02-23

## 2021-02-23 RX ORDER — MERCAPTOPURINE 50 MG/1
TABLET ORAL
Qty: 38 TABLET | Refills: 0 | Status: ON HOLD
Start: 2021-02-23 | End: 2021-03-23

## 2021-02-23 RX ORDER — SODIUM CHLORIDE, SODIUM LACTATE, POTASSIUM CHLORIDE, CALCIUM CHLORIDE 600; 310; 30; 20 MG/100ML; MG/100ML; MG/100ML; MG/100ML
INJECTION, SOLUTION INTRAVENOUS CONTINUOUS PRN
Status: DISCONTINUED | OUTPATIENT
Start: 2021-02-23 | End: 2021-02-23

## 2021-02-23 RX ORDER — HEPARIN SODIUM,PORCINE 10 UNIT/ML
3-6 VIAL (ML) INTRAVENOUS EVERY 24 HOURS
Status: DISCONTINUED | OUTPATIENT
Start: 2021-02-23 | End: 2021-02-23 | Stop reason: HOSPADM

## 2021-02-23 RX ORDER — ONDANSETRON 2 MG/ML
INJECTION INTRAMUSCULAR; INTRAVENOUS PRN
Status: DISCONTINUED | OUTPATIENT
Start: 2021-02-23 | End: 2021-02-23

## 2021-02-23 RX ORDER — FENTANYL CITRATE 50 UG/ML
INJECTION, SOLUTION INTRAMUSCULAR; INTRAVENOUS PRN
Status: DISCONTINUED | OUTPATIENT
Start: 2021-02-23 | End: 2021-02-23

## 2021-02-23 RX ORDER — HEPARIN SODIUM,PORCINE 10 UNIT/ML
5 VIAL (ML) INTRAVENOUS ONCE
Status: DISCONTINUED | OUTPATIENT
Start: 2021-02-23 | End: 2021-02-23 | Stop reason: HOSPADM

## 2021-02-23 RX ORDER — LIDOCAINE 40 MG/G
CREAM TOPICAL
Status: COMPLETED | OUTPATIENT
Start: 2021-02-23 | End: 2021-02-23

## 2021-02-23 RX ORDER — HEPARIN SODIUM (PORCINE) LOCK FLUSH IV SOLN 100 UNIT/ML 100 UNIT/ML
SOLUTION INTRAVENOUS
Status: DISCONTINUED
Start: 2021-02-23 | End: 2021-02-23 | Stop reason: HOSPADM

## 2021-02-23 RX ADMIN — SODIUM CHLORIDE, POTASSIUM CHLORIDE, SODIUM LACTATE AND CALCIUM CHLORIDE: 600; 310; 30; 20 INJECTION, SOLUTION INTRAVENOUS at 09:40

## 2021-02-23 RX ADMIN — LIDOCAINE: 40 CREAM TOPICAL at 09:06

## 2021-02-23 RX ADMIN — VINCRISTINE SULFATE 2 MG: 1 INJECTION, SOLUTION INTRAVENOUS at 08:49

## 2021-02-23 RX ADMIN — Medication 5 ML: at 08:51

## 2021-02-23 RX ADMIN — HEPARIN, PORCINE (PF) 10 UNIT/ML INTRAVENOUS SYRINGE 5 ML: at 08:51

## 2021-02-23 RX ADMIN — MIDAZOLAM 2 MG: 1 INJECTION INTRAMUSCULAR; INTRAVENOUS at 09:43

## 2021-02-23 RX ADMIN — FENTANYL CITRATE 25 MCG: 50 INJECTION, SOLUTION INTRAMUSCULAR; INTRAVENOUS at 09:43

## 2021-02-23 RX ADMIN — ONDANSETRON 4 MG: 2 INJECTION INTRAMUSCULAR; INTRAVENOUS at 09:55

## 2021-02-23 RX ADMIN — SODIUM CHLORIDE 50 ML: 9 INJECTION, SOLUTION INTRAVENOUS at 08:51

## 2021-02-23 RX ADMIN — HEPARIN 500 UNITS: 100 SYRINGE at 10:38

## 2021-02-23 RX ADMIN — FENTANYL CITRATE 12.5 MCG: 50 INJECTION, SOLUTION INTRAMUSCULAR; INTRAVENOUS at 09:46

## 2021-02-23 RX ADMIN — METHOTREXATE: 25 INJECTION INTRA-ARTERIAL; INTRAMUSCULAR; INTRATHECAL; INTRAVENOUS at 09:44

## 2021-02-23 RX ADMIN — DEXMEDETOMIDINE HYDROCHLORIDE 8 MCG: 100 INJECTION, SOLUTION INTRAVENOUS at 09:46

## 2021-02-23 ASSESSMENT — PAIN SCALES - GENERAL: PAINLEVEL: NO PAIN (0)

## 2021-02-23 ASSESSMENT — MIFFLIN-ST. JEOR
SCORE: 1807.49
SCORE: 1810.13

## 2021-02-23 NOTE — ANESTHESIA CARE TRANSFER NOTE
Patient: Lazaro Lund    Procedure(s):  Lumbar puncture with intrathecal Chemotherapy (not CD)    Diagnosis: Lymphoma (H) [C85.90]  Diagnosis Additional Information: No value filed.    Anesthesia Type:   MAC     Note:      Level of Consciousness: awake  Oxygen Supplementation: nasal cannula  Level of Supplemental Oxygen (L/min / FiO2): 2  Independent Airway: airway patency satisfactory and stable  Dentition: dentition unchanged      Patient transferred to:  Recovery    Handoff Report: Identifed the Patient, Identified the Reponsible Provider, Reviewed the pertinent medical history, Discussed the surgical course, Reviewed Intra-OP anesthesia mangement and issues during anesthesia, Set expectations for post-procedure period and Allowed opportunity for questions and acknowledgement of understanding      Vitals: (Last set prior to Anesthesia Care Transfer)  CRNA VITALS  2/23/2021 0927 - 2/23/2021 1027      2/23/2021             NIBP:  (!) 80/44    Pulse:  63    NIBP Mean:  57    Ht Rate:  63    Temp:  36.6  C (97.9  F)    SpO2:  100 %    Resp Rate (observed):  16    EKG:  NSR        Electronically Signed By: Kyle Palomino MD  February 23, 2021  10:33 AM

## 2021-02-23 NOTE — LETTER
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 Lazaro presented with acute onset cough and was found to have an anterior mediastinal mass and malignant left-sided pleural effusion.  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  He was admitted to [facility] oncology service 8/30 and started on treatment per [id number].  He had a pleural effusion that required a chest tube and a pericardial effusion that was not drained. His Induction was complicated by cardiac compression secondary to his mass leading to tamponade, this improved through out his hospital stay.  He also had dysphagia that improved with treatment of his mass, swallow study was normal. His course was complicated by extensive bilateral UE DVTs, and he was successfully treated with anticoagulation. His consolidation course was complicated by the development of severe asparaginase induced necrotizing pancreatitis. He became quite ill with SIRS and associated hypotension, he required pressor support in the ICU. As a result, asparaginase was eliminated from his treatment plan. He was in CR status at end of consolidation. During maintenance, he developed hepatotoxicity so allopurinol and dose reduced 6MP were started for correction of skewed 6-MP metabolism.  Lazaro comes to clinic today for Day 57 of his 4th Maintenance cycle with a make up LP with intrathecal methotrexate.      HPI:   Since his last visit Lazaro has been doing very well. His energy has been good. He is sleeping well. No pain or paresthesias. He notes that the chest pain he was experiencing at his last appointment has completely resolved. He has had a slightly decreased appetite, but attributes this to stopping smoking marajuana regularly. Otherwise appetite has been  good. Voiding and stooling without issue.    Lazaro did have approximately one week of mouth sores. He notes that he had 3 different areas that had developed sores. These have since resolved and he does not have concerns of this today. No fever, cough, shortness of breath, or other acute ill symptoms. No other concerns today.      Review of systems:  The 10 point Review of Systems is negative other than noted in the HPI or here.      PMH:   Past Medical History:   Diagnosis Date     Acute necrotizing pancreatitis 11/07/2019    attributed to asparaginase     Acute pancreatitis due to PEGaspariginase therapy  11/15/2019     DVT of upper extremity (deep vein thrombosis) (H) 09/26/2019    Bilateral      Edema of upper extremity 10/17/2019     Folliculitis 10/17/2019     Migraine 2006    have resolved     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     -- Spinal HA following LP  -- TPMT shows Intermediate activity with normal TPMT/NUDT15 genotype  -- Factor V leiden and prothrombin gene mutation negative  -- Necrotizing pancreatitis secondary to asparaginase  -- former smoke, quit with the use of nicotine patches. Former daily marajuana smoker, now only uses very occasionally  PFMH:   Family History   Problem Relation Age of Onset     No Known Problems Mother      No Known Problems Father      Asthma Brother      Thyroid Cancer Paternal Grandmother      Melanoma Paternal Aunt        Social History:   Social History     Social History Narrative    Lazaro previously lived at home with his dad and step mom in [place] but is now staying with his grandma in [place]. Biological mother is involved in his life. Has 4 step-siblings and 1 biological sibling. 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 Has completed high school. 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  He has been enjoying fishing and  [employer] games.        Current Medications:  Current Outpatient Medications on File Prior to Visit   Medication Sig Dispense Refill     allopurinol (ZYLOPRIM) 100 MG tablet Take 1 tablet (100 mg) by mouth daily 30 tablet 11     diphenhydrAMINE (BENADRYL) 25 MG capsule Take 1-2 capsules (25-50 mg) by mouth every 6 hours as needed (Breakthrough Nausea and Vomiting ) 30 capsule 1     lidocaine-prilocaine (EMLA) 2.5-2.5 % external cream Apply topically as needed for other (prior to port access) 30 g 6     mercaptopurine (PURINETHOL) 50 MG tablet Take 50mg (1 tablet) three days/week and 75mg (1.5 tablets) four days/week. 38 tablet 0     methotrexate 2.5 MG tablet Take 16 tablets (40 mg) by mouth once a week Do not take on Days of LP. 64 tablet 2     ondansetron (ZOFRAN-ODT) 8 MG ODT tab Take 1 tablet (8 mg) by mouth every 8 hours as needed for nausea 20 tablet 6     pantoprazole (PROTONIX) 40 MG EC tablet Take 1 tablet (40 mg) by mouth every morning (before breakfast) During weeks of taking prednisone. 30 tablet 2     polyethylene glycol (MIRALAX/GLYCOLAX) packet Take 17 g by mouth daily 30 packet 0     predniSONE (DELTASONE) 10 MG tablet Take 40mg (4 tabs) twice dialy for 5 days every 4 weeks. 40 tablet 2     sennosides (SENOKOT) 8.6 MG tablet Take 2 tablets by mouth 2 times daily as needed for constipation 60 each 1     sulfamethoxazole-trimethoprim (BACTRIM DS) 800-160 MG tablet Take 1 tablet by mouth Every Mon, Tues two times daily 16 tablet 11     Vitamin D, Cholecalciferol, 25 MCG (1000 UT) TABS Take 1 tablet (1,000 Units) by mouth daily 30 tablet 3     Reviewed medications with Lazaro. Confirmed oral chemo doses, has not missed any doses. He is not taking Vitamin D currently.  Received [id number] influenza vaccine on 12/22/20     Physical Exam:   There were no vitals taken for this visit.      Wt Readings from Last 4 Encounters:   02/23/21 76.2 kg (167 lb 15.9 oz)   02/23/21 76.5 kg (168 lb 10.4 oz)   01/26/21 76.2 kg  "(167 lb 15.9 oz)   01/26/21 76.3 kg (168 lb 3.4 oz)     Ht Readings from Last 2 Encounters:   02/23/21 1.845 m (6' 0.64\")   02/23/21 1.836 m (6' 0.28\")     General: Lazaro is alert, interactive and appropriate, well-appearing, in no distress  HEENT: Skull is atrauamatic and normocephalic.  Full head of hair. PERRLA, sclera are non icteric and not injected, EOM are intact. Nares are patent without drainage. Oropharynx clear, no plaques noted. Buccal mucosa and tongue clear. MMM.  Neck:  Supple without lymphadenopathy.   Lymph: There is no cervical, supraclavicular, axillary, lymphadenopathy palpated.  Cardiovascular:  HR is regular, S1, S2 no murmur.  Capillary refill is < 2 seconds.   Respiratory: No cough noted. Breathing effortlessly. Lungs are clear to auscultation throughout.  No crackles or wheezes.  Gastrointestinal: BS present in all quadrants.  Abdomen is soft and flat.  No pain with palpation. No hepatosplenomegaly or masses are palpated.  Skin: No concerning lesions  Neurological:  CN 2-12 grossly intact. Gait is normal.  No issues with balance. Sensation intact in hands and feet.     Musculoskeletal: Good strength and ROM in all extremities.  Strong dorsiflexion at ankles and great toes (5/5) bilaterally without any pain at the Achilles.     Labs:   The following tests were ordered and interpreted by me today:  -- CBC with differential  -- CMP    Results for orders placed or performed during the hospital encounter of 02/23/21   Asymptomatic SARS-CoV-2 COVID-19 Virus (Coronavirus) by PCR     Status: None    Specimen: Nasopharyngeal   Result Value Ref Range    SARS-CoV-2 Virus Specimen Source Nasopharyngeal     SARS-CoV-2 PCR Result NEGATIVE     SARS-CoV-2 PCR Comment (Note)    Results for orders placed or performed in visit on 02/23/21   CBC with platelets differential     Status: Abnormal   Result Value Ref Range    WBC 2.3 (L) 4.0 - 11.0 10e9/L    RBC Count 3.37 (L) 4.4 - 5.9 10e12/L    Hemoglobin 12.0 " (L) 13.3 - 17.7 g/dL    Hematocrit 33.2 (L) 40.0 - 53.0 %    MCV 99 78 - 100 fl    MCH 35.6 (H) 26.5 - 33.0 pg    MCHC 36.1 31.5 - 36.5 g/dL    RDW 14.2 10.0 - 15.0 %    Platelet Count 202 150 - 450 10e9/L    Diff Method Automated Method     % Neutrophils 68.9 %    % Lymphocytes 20.6 %    % Monocytes 6.1 %    % Eosinophils 3.5 %    % Basophils 0.9 %    % Immature Granulocytes 0.0 %    Nucleated RBCs 0 0 /100    Absolute Neutrophil 1.6 1.6 - 8.3 10e9/L    Absolute Lymphocytes 0.5 (L) 0.8 - 5.3 10e9/L    Absolute Monocytes 0.1 0.0 - 1.3 10e9/L    Absolute Eosinophils 0.1 0.0 - 0.7 10e9/L    Absolute Basophils 0.0 0.0 - 0.2 10e9/L    Abs Immature Granulocytes 0.0 0 - 0.4 10e9/L    Absolute Nucleated RBC 0.0    Comprehensive metabolic panel     Status: Abnormal   Result Value Ref Range    Sodium 140 133 - 144 mmol/L    Potassium 4.0 3.4 - 5.3 mmol/L    Chloride 109 94 - 109 mmol/L    Carbon Dioxide 24 20 - 32 mmol/L    Anion Gap 7 3 - 14 mmol/L    Glucose 93 70 - 99 mg/dL    Urea Nitrogen 16 7 - 30 mg/dL    Creatinine 0.62 (L) 0.66 - 1.25 mg/dL    GFR Estimate >90 >60 mL/min/[1.73_m2]    GFR Estimate If Black >90 >60 mL/min/[1.73_m2]    Calcium 8.9 8.5 - 10.1 mg/dL    Bilirubin Total 0.8 0.2 - 1.3 mg/dL    Albumin 4.2 3.4 - 5.0 g/dL    Protein Total 6.5 (L) 6.8 - 8.8 g/dL    Alkaline Phosphatase 69 40 - 150 U/L    ALT 21 0 - 70 U/L    AST 12 0 - 45 U/L       Assessment:  Lazaro Lund is a 20 year old young man with T cell lymphoblastic lymphoma (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  He is being treated per [facility] protocol [id number]. He's had a CRu following Induction with a CR at the end of Consolidation. His course was complicated by extensive bilateral DVT and severe necrotizing pancreatitis requiring cessation of PEG-asparaginase from his treatment.  He comes to clinic today for Day 57 Cycle 4 of Maintenance therapy with an LP (make up from day 29).     Overall, he has been doing well. He is currently on 43% full dose of 6MP with  allopurinol for correction of skewed 6-MP metabolism and 100% methotrexate. ANC has come down nicely since increasing his dose last month. His ANC is 1.6 today. Mouth sores have resolved. Previously noted chest pain has also resolved.    Plan:   1) Labs reviewed with Lazaro. Continue with currently prescribed 43% 6MP (50 mg 3 days/week and 75 mg 4 days/week) and 100% Methotrexate (16 tabs). Refills today. Proceed with chemotherapy and LP.  2) Start prednisone burst. Refill today.  3) Protonix during steroid bursts. Refill today.  4) Most recent Vit D level was normal however Lazaro should continue on Vit D 1000IU daily  5) Bactrim for PCP prophylaxis. Refill today.  6) Continue to check CMP and TGNs monthly moving forward. TGNs appropriate at D29 and pending from today.  7) Lazaro will need COVID testing prior to LPs moving forward.  He prefers to come the day of, have it done in sedation and then come to clinic for chemo while he is waiting for results.   8) RTC in 4 weeks for D1 of Cycle 5 including LP with IT chemo, vincristine, lab monitoring and exam    Marie Damon CNP    Total time spent on the following services on the date of the encounter:  -- Ordering medications, test, procedures, chemotherapy  -- Interpretation of labs and imaging    -- Performing a medically appropriate examination  -- Counseling and educating the patient/family/caregiver  -- Documenting clinical information in the electronic  -- Communicating results to the patient/family/caregiver  -- Care coordination  -- Total time spent: 40 minutes      Marie Damon NP

## 2021-02-23 NOTE — ANESTHESIA POSTPROCEDURE EVALUATION
Patient: Lazaro Lund    Procedure(s):  Lumbar puncture with intrathecal Chemotherapy (not CD)    Diagnosis:Lymphoma (H) [C85.90]  Diagnosis Additional Information: No value filed.    Anesthesia Type:  MAC    Note:  Disposition: Outpatient   Postop Pain Control: Uneventful            Sign Out: Well controlled pain   PONV: No   Neuro/Psych: Uneventful            Sign Out: Acceptable/Baseline neuro status   Airway/Respiratory: Uneventful            Sign Out: Acceptable/Baseline resp. status   CV/Hemodynamics: Uneventful            Sign Out: Acceptable CV status   Other NRE: NONE   DID A NON-ROUTINE EVENT OCCUR? No    Event details/Postop Comments:  - Uneventful, ready for signout  - tolerated MAC well         Last vitals:  There were no vitals filed for this visit.    Last vitals prior to Anesthesia Care Transfer:  CRNA VITALS  2/23/2021 0927 - 2/23/2021 1027      2/23/2021             NIBP:  (!) 80/44    Pulse:  63    NIBP Mean:  57    Ht Rate:  63    Temp:  36.6  C (97.9  F)    SpO2:  100 %    Resp Rate (observed):  16    EKG:  NSR          Electronically Signed By: Kyle Palomino MD  February 23, 2021  10:34 AM

## 2021-02-23 NOTE — PROGRESS NOTES
A Lumbar Puncture was performed in the Pediatric Sedation Suite. Informed consent was obtained prior to the procedure. Lazaro Lund was identified by facial recognition and ID arm band. A time-out was performed. Lazaro Lund was then placed in the left lateral decubitus position and the lumbosacral area was sterily prepped using Chloraprep followed by drape placement. Anatomic landmarks were identified by palpation. Then, a 22 gauge, 3.5 inch Slava spinal needle was easily inserted into the L3/L4 interspace. On the first attempt approximately 3 mL of clear and colorless cerebrospinal fluid was obtained to be sent to the lab for cell count analysis and cytospin. Following that, 15mg of intrathecal methotrexate in 6 mL of preservative-free normal saline was infused without resistance. The needle was removed and a Band-Aid applied. Lazaor Lund tolerated this procedure very well.      Procedure was performed by Marie Damon CNP.  I was present for and supervised the entire procedure.   CAROLE Van CNP

## 2021-02-23 NOTE — ANESTHESIA PREPROCEDURE EVALUATION
Anesthesia Pre-Procedure Evaluation    Patient: Lazaro Lund   MRN:     [id number] Gender:   male   Age:    20 year old :      2000        Preoperative Diagnosis: Lymphoma (H) [C85.90]   Procedure(s):  Lumbar puncture with intrathecal Chemotherapy (not CD)     LABS:  CBC:   Lab Results   Component Value Date    WBC 2.3 (L) 2021    WBC 3.0 (L) 2021    HGB 12.0 (L) 2021    HGB 12.4 (L) 2021    HCT 33.2 (L) 2021    HCT 36.0 (L) 2021     2021     2021     BMP:   Lab Results   Component Value Date     2021     2021    POTASSIUM 4.0 2021    POTASSIUM 3.9 2021    CHLORIDE 109 2021    CHLORIDE 108 2021    CO2 24 2021    CO2 28 2021    BUN 16 2021    BUN 16 2021    CR 0.62 (L) 2021    CR 0.82 2021    GLC 93 2021    GLC 83 2021     COAGS:   Lab Results   Component Value Date    PTT 45 (H) 2019    INR 1.09 2019    FIBR 293 2019     POC:   Lab Results   Component Value Date    BGM 87 2019     OTHER:   Lab Results   Component Value Date    LACT 0.4 (L) 2019    MICHAEL 8.9 2021    PHOS 4.2 2019    MAG 2.1 2019    ALBUMIN 4.2 2021    PROTTOTAL 6.5 (L) 2021    ALT 21 2021    AST 12 2021    ALKPHOS 69 2021    BILITOTAL 0.8 2021    LIPASE 21 (L) 2020    AMYLASE 24 (L) 2020    .0 (H) 2019        Preop Vitals    BP Readings from Last 3 Encounters:   21 109/66   01/26/21 122/64   20 107/68    Pulse Readings from Last 3 Encounters:   21 79   21 80   20 79      Resp Readings from Last 3 Encounters:   21 16   21 16   20 16    SpO2 Readings from Last 3 Encounters:   21 99%   21 96%   20 99%      Temp Readings from Last 1 Encounters:   21 36.7  C (98  F) (Oral)    Ht Readings from Last 1 Encounters:  "  02/23/21 1.845 m (6' 0.64\")      Wt Readings from Last 1 Encounters:   02/23/21 76.2 kg (167 lb 15.9 oz)    Estimated body mass index is 22.39 kg/m  as calculated from the following:    Height as of this encounter: 1.845 m (6' 0.64\").    Weight as of this encounter: 76.2 kg (167 lb 15.9 oz).     LDA:  Port A Cath Single 10/24/19 Right Chest wall (Active)   Site Assessment WDL 02/23/21 0834   Dressing Intervention Transparent 02/23/21 0834   Dressing change due 04/02/20 03/26/20 0845   Line Status Saline locked 02/23/21 0834   Access Date 02/23/21 02/23/21 0805   Access Attempts 1 02/23/21 0805   Gauge Power noncoring 90 degree bend;20 gauge;3/4 inch 02/23/21 0805   Needle Change Due 04/02/20 03/26/20 0845   Line Necessity Yes, meets criteria 03/26/20 0845   De-Access Date 01/26/21 01/26/21 1326   Date to be Reflushed 02/23/21 01/26/21 1326   Extravasation? No 01/26/21 1210   Number of days: 488        Past Medical History:   Diagnosis Date     Acute necrotizing pancreatitis 11/07/2019    attributed to asparaginase     Acute pancreatitis due to PEGaspariginase therapy  11/15/2019     DVT of upper extremity (deep vein thrombosis) (H) 09/26/2019    Bilateral      Edema of upper extremity 10/17/2019     Folliculitis 10/17/2019     Migraine 2006    have resolved     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      Past Surgical History:   Procedure Laterality Date     BONE MARROW BIOPSY, BONE SPECIMEN, NEEDLE/TROCAR Left 9/1/2019    Procedure: BIOPSY, BONE MARROW;  Surgeon: Heather Lopez MD;  [facility]: [facility]     INSERT PICC LINE N/A 8/31/2019    Procedure: INSERTION, PICC;  Surgeon: Michell Keith MD;  [facility]: [facility] OR     INSERT PORT VASCULAR ACCESS N/A 10/24/2019    Procedure: INSERTION, VASCULAR ACCESS PORT;  Surgeon: Silviano Martins MD;  Location: [facility] PEDS SED[facility]      IR CHEST PORT PLACEMENT > 5 YRS OF AGE  10/24/2019     IR CHEST TUBE PLACEMENT NON-TUNNELLED LEFT  8/31/2019     IR PICC PLACEMENT > 5 YRS " OF AGE  8/31/2019     IR PORT CHECK RIGHT  11/12/2019     SPINAL PUNCTURE,LUMBAR, INTRATHECAL CHEMO DELIVERY N/A 8/31/2019    Procedure: LUMBAR PUNCTURE, WITH INTRATHECAL CHEMOTHERAPY ADMINISTRATION;  Surgeon: Heather Lopez MD;  [facility]     SPINAL PUNCTURE,LUMBAR, INTRATHECAL CHEMO DELIVERY N/A 9/9/2019    Procedure: Lumbar Puncture With Intrathecal Chemo;  Surgeon: Alexi Hayes MD;  [facility]     SPINAL PUNCTURE,LUMBAR, INTRATHECAL CHEMO DELIVERY N/A 10/10/2019    Procedure: Lumbar puncture with IT Chemo (CD);  Surgeon: Reynaldo Campuzano MD;  [facility]      SPINAL PUNCTURE,LUMBAR, INTRATHECAL CHEMO DELIVERY N/A 10/17/2019    Procedure: Lumbar puncture with IT Chemo (not CD);  Surgeon: Reynaldo Campuzano MD;  [facility]      SPINAL PUNCTURE,LUMBAR, INTRATHECAL CHEMO DELIVERY N/A 10/24/2019    Procedure: LUMBAR PUNCTURE, WITH INTRATHECAL CHEMOTHERAPY ADMINISTRATION;  Surgeon: Félix Van APRN CNP;  [facility]      SPINAL PUNCTURE,LUMBAR, INTRATHECAL CHEMO DELIVERY N/A 10/31/2019    Procedure: Lumbar puncture with IT Chemo (not CD);  Surgeon: Reynaldo Campuzano MD;  [facility]      SPINAL PUNCTURE,LUMBAR, INTRATHECAL CHEMO DELIVERY N/A 12/19/2019    Procedure: Lumbar puncture with IT Chem (CD);  Surgeon: Félix Van APRN CNP;  [facility]      SPINAL PUNCTURE,LUMBAR, INTRATHECAL CHEMO DELIVERY N/A 12/23/2019    Procedure: Lumbar puncture with IT Chem (CD);  Surgeon: Heather Lopez MD;  [facility]      SPINAL PUNCTURE,LUMBAR, INTRATHECAL CHEMO DELIVERY N/A 1/23/2020    Procedure: Lumbar puncture with IT Chem (CD);  Surgeon: Reynaldo Campuzano MD;  [facility]      SPINAL PUNCTURE,LUMBAR, INTRATHECAL CHEMO DELIVERY N/A 2/20/2020    Procedure: Lumbar puncture with intrathecal Chemotherapy (CD);  Surgeon: Reynaldo Campuzano MD;   [facility]      SPINAL PUNCTURE,LUMBAR, INTRATHECAL CHEMO DELIVERY N/A 3/19/2020    Procedure: Lumbar puncture with intrathecal Chemotherapy (CD);  Surgeon: Reynaldo Campuzano MD;  [facility]      SPINAL PUNCTURE,LUMBAR, INTRATHECAL CHEMO DELIVERY N/A 3/26/2020    Procedure: Lumbar puncture with intrathecal Chemotherapy (not CD);  Surgeon: Todd Mrecado MD;  [facility]      SPINAL PUNCTURE,LUMBAR, INTRATHECAL CHEMO DELIVERY N/A 4/23/2020    Procedure: Lumbar puncture with intrathecal Chemotherapy (CD);  Surgeon: Marleny Brewer MD;  [facility]      SPINAL PUNCTURE,LUMBAR, INTRATHECAL CHEMO DELIVERY N/A 5/14/2020    Procedure: Lumbar puncture with intrathecal Chemotherapy (not CD);  Surgeon: Todd Mercado MD;  [facility]      SPINAL PUNCTURE,LUMBAR, INTRATHECAL CHEMO DELIVERY N/A 7/9/2020    Procedure: Lumbar puncture with intrathecal Chemotherapy (CD);  Surgeon: Todd Mercado MD;  [facility]      SPINAL PUNCTURE,LUMBAR, INTRATHECAL CHEMO DELIVERY N/A 8/6/2020    Procedure: Lumbar puncture with intrathecal Chemotherapy (not CD);  Surgeon: Todd Mercado MD;  [facility]      SPINAL PUNCTURE,LUMBAR, INTRATHECAL CHEMO DELIVERY N/A 10/6/2020    Procedure: Lumbar puncture with intrathecal Chemotherapy (not CD);  Surgeon: Félix Van, APRN CNP;  [facility]      SPINAL PUNCTURE,LUMBAR, INTRATHECAL CHEMO DELIVERY N/A 11/3/2020    Procedure: Lumbar puncture with intrathecal Chemotherapy (not CD);  Surgeon: Reynaldo Campuzano MD;  [facility]      SPINAL PUNCTURE,LUMBAR, INTRATHECAL CHEMO DELIVERY N/A 12/29/2020    Procedure: Lumbar puncture with intrathecal Chemotherapy (CD);  Surgeon: Reynaldo Campuzano MD;  [facility]      THORACENTESIS N/A 8/31/2019    Procedure: Thoracentesis;  Surgeon: Michell Keith MD;   [facility]      Allergies   Allergen Reactions     Asparaginase Derivatives Other (See Comments)     Severe pancreatitis     No Known Drug Allergies         Anesthesia Evaluation    ROS/Med Hx    No history of anesthetic complications    Cardiovascular Findings - negative ROS    Neuro Findings - negative ROS    Pulmonary Findings - negative ROS    HENT Findings - negative HENT ROS    Skin Findings - negative skin ROS     Findings   (-) prematurity      GI/Hepatic/Renal Findings   (-) liver disease and renal disease  Comments:   - H/o necrotizing pancreatitis d/t asparaginase    Endocrine/Metabolic Findings - negative ROS      Genetic/Syndrome Findings - negative genetics/syndromes ROS    Hematology/Oncology Findings   (+) cancer (Lymphoma)    Additional Notes  - H/o DVT in UE  - Marijuana use          PHYSICAL EXAM:   Mental Status/Neuro: A/A/O   Airway: Facies: Feasible  Mallampati: I  Mouth/Opening: Full  TM distance: > 6 cm  Neck ROM: Full   Respiratory: Auscultation: CTAB     Resp. Rate: Normal     Resp. Effort: Normal      CV: Rhythm: Regular  Rate: Age appropriate  Heart: Normal Sounds  Edema: None   Comments:      Dental: Normal Dentition                Anesthesia Plan    ASA Status:  3   NPO Status:  NPO Appropriate    Anesthesia Type: MAC.   Induction: Intravenous.   Maintenance: N/A.   Techniques and Equipment:     - Lines/Monitors: Port in situ     Consents    Anesthesia Plan(s) and associated risks, benefits, and realistic alternatives discussed. Questions answered and patient/representative(s) expressed understanding.     - Discussed with:  Patient      - Extended Intubation/Ventilatory Support Discussed: no Extended Intubation.      - Patient is DNR/DNI Status: No    Use of blood products discussed: No .     Postoperative Care    Post procedure pain management: none anticipated.   PONV prophylaxis: Ondansetron (or other 5HT-3)     Comments:    Discussed common and potentially harmful  risks for Native Airway, MAC (GA as backup).   These risks include, but were not limited to: Conversion to secured airway, Sore throat, Airway injury, Dental injury, Aspiration, Respiratory issues (Bronchospasm, Laryngospasm, Desaturation), Hemodynamic issues (Arrhythmia, Hypotension, Ischemia), Potential long term consequences of respiratory and hemodynamic issues, PONV, Emergence delirium  Risks of invasive procedures were not discussed: N/A    All questions were answered.         Kyle Palomino MD

## 2021-02-23 NOTE — PROGRESS NOTES
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 Lazaro presented with acute onset cough and was found to have an anterior mediastinal mass and malignant left-sided pleural effusion.  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  He was admitted to [facility] oncology service 8/30 and started on treatment per [id number].  He had a pleural effusion that required a chest tube and a pericardial effusion that was not drained. His Induction was complicated by cardiac compression secondary to his mass leading to tamponade, this improved through out his hospital stay.  He also had dysphagia that improved with treatment of his mass, swallow study was normal. His course was complicated by extensive bilateral UE DVTs, and he was successfully treated with anticoagulation. His consolidation course was complicated by the development of severe asparaginase induced necrotizing pancreatitis. He became quite ill with SIRS and associated hypotension, he required pressor support in the ICU. As a result, asparaginase was eliminated from his treatment plan. He was in CR status at end of consolidation. During maintenance, he developed hepatotoxicity so allopurinol and dose reduced 6MP were started for correction of skewed 6-MP metabolism.  Lazaro comes to clinic today for Day 57 of his 4th Maintenance cycle with a make up LP with intrathecal methotrexate.      HPI:   Since his last visit Lazaro has been doing very well. His energy has been good. He is sleeping well. No pain or paresthesias. He notes that the chest pain he was experiencing at his last appointment has completely resolved. He has had a slightly decreased appetite, but attributes this to stopping smoking marajuana regularly. Otherwise appetite has been good. Voiding and stooling without issue.    Lazaro did have approximately one week of  mouth sores. He notes that he had 3 different areas that had developed sores. These have since resolved and he does not have concerns of this today. No fever, cough, shortness of breath, or other acute ill symptoms. No other concerns today.      Review of systems:  The 10 point Review of Systems is negative other than noted in the HPI or here.      PMH:   Past Medical History:   Diagnosis Date     Acute necrotizing pancreatitis 11/07/2019    attributed to asparaginase     Acute pancreatitis due to PEGaspariginase therapy  11/15/2019     DVT of upper extremity (deep vein thrombosis) (H) 09/26/2019    Bilateral      Edema of upper extremity 10/17/2019     Folliculitis 10/17/2019     Migraine 2006    have resolved     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     -- Spinal HA following LP  -- TPMT shows Intermediate activity with normal TPMT/NUDT15 genotype  -- Factor V leiden and prothrombin gene mutation negative  -- Necrotizing pancreatitis secondary to asparaginase  -- former smoke, quit with the use of nicotine patches. Former daily marajuana smoker, now only uses very occasionally  PFMH:   Family History   Problem Relation Age of Onset     No Known Problems Mother      No Known Problems Father      Asthma Brother      Thyroid Cancer Paternal Grandmother      Melanoma Paternal Aunt        Social History:   Social History     Social History Narrative    Lazaro previously lived at home with his dad and step mom in [place] but is now staying with his grandma in [place]. Biological mother is involved in his life. Has 4 step-siblings and 1 biological sibling. 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 Has completed high school. 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  He has been enjoying fishing and video games.        Current Medications:  Current Outpatient Medications on File Prior  to Visit   Medication Sig Dispense Refill     allopurinol (ZYLOPRIM) 100 MG tablet Take 1 tablet (100 mg) by mouth daily 30 tablet 11     diphenhydrAMINE (BENADRYL) 25 MG capsule Take 1-2 capsules (25-50 mg) by mouth every 6 hours as needed (Breakthrough Nausea and Vomiting ) 30 capsule 1     lidocaine-prilocaine (EMLA) 2.5-2.5 % external cream Apply topically as needed for other (prior to port access) 30 g 6     mercaptopurine (PURINETHOL) 50 MG tablet Take 50mg (1 tablet) three days/week and 75mg (1.5 tablets) four days/week. 38 tablet 0     methotrexate 2.5 MG tablet Take 16 tablets (40 mg) by mouth once a week Do not take on Days of LP. 64 tablet 2     ondansetron (ZOFRAN-ODT) 8 MG ODT tab Take 1 tablet (8 mg) by mouth every 8 hours as needed for nausea 20 tablet 6     pantoprazole (PROTONIX) 40 MG EC tablet Take 1 tablet (40 mg) by mouth every morning (before breakfast) During weeks of taking prednisone. 30 tablet 2     polyethylene glycol (MIRALAX/GLYCOLAX) packet Take 17 g by mouth daily 30 packet 0     predniSONE (DELTASONE) 10 MG tablet Take 40mg (4 tabs) twice dialy for 5 days every 4 weeks. 40 tablet 2     sennosides (SENOKOT) 8.6 MG tablet Take 2 tablets by mouth 2 times daily as needed for constipation 60 each 1     sulfamethoxazole-trimethoprim (BACTRIM DS) 800-160 MG tablet Take 1 tablet by mouth Every Mon, Tues two times daily 16 tablet 11     Vitamin D, Cholecalciferol, 25 MCG (1000 UT) TABS Take 1 tablet (1,000 Units) by mouth daily 30 tablet 3     Reviewed medications with Lazaro. Confirmed oral chemo doses, has not missed any doses. He is not taking Vitamin D currently.  Received [id number] influenza vaccine on 12/22/20     Physical Exam:   There were no vitals taken for this visit.      Wt Readings from Last 4 Encounters:   02/23/21 76.2 kg (167 lb 15.9 oz)   02/23/21 76.5 kg (168 lb 10.4 oz)   01/26/21 76.2 kg (167 lb 15.9 oz)   01/26/21 76.3 kg (168 lb 3.4 oz)     Ht Readings from Last 2  "Encounters:   02/23/21 1.845 m (6' 0.64\")   02/23/21 1.836 m (6' 0.28\")     General: Lazaro is alert, interactive and appropriate, well-appearing, in no distress  HEENT: Skull is atrauamatic and normocephalic.  Full head of hair. PERRLA, sclera are non icteric and not injected, EOM are intact. Nares are patent without drainage. Oropharynx clear, no plaques noted. Buccal mucosa and tongue clear. MMM.  Neck:  Supple without lymphadenopathy.   Lymph: There is no cervical, supraclavicular, axillary, lymphadenopathy palpated.  Cardiovascular:  HR is regular, S1, S2 no murmur.  Capillary refill is < 2 seconds.   Respiratory: No cough noted. Breathing effortlessly. Lungs are clear to auscultation throughout.  No crackles or wheezes.  Gastrointestinal: BS present in all quadrants.  Abdomen is soft and flat.  No pain with palpation. No hepatosplenomegaly or masses are palpated.  Skin: No concerning lesions  Neurological:  CN 2-12 grossly intact. Gait is normal.  No issues with balance. Sensation intact in hands and feet.     Musculoskeletal: Good strength and ROM in all extremities.  Strong dorsiflexion at ankles and great toes (5/5) bilaterally without any pain at the Achilles.     Labs:   The following tests were ordered and interpreted by me today:  -- CBC with differential  -- CMP    Results for orders placed or performed during the hospital encounter of 02/23/21   Asymptomatic SARS-CoV-2 COVID-19 Virus (Coronavirus) by PCR     Status: None    Specimen: Nasopharyngeal   Result Value Ref Range    SARS-CoV-2 Virus Specimen Source Nasopharyngeal     SARS-CoV-2 PCR Result NEGATIVE     SARS-CoV-2 PCR Comment (Note)    Results for orders placed or performed in visit on 02/23/21   CBC with platelets differential     Status: Abnormal   Result Value Ref Range    WBC 2.3 (L) 4.0 - 11.0 10e9/L    RBC Count 3.37 (L) 4.4 - 5.9 10e12/L    Hemoglobin 12.0 (L) 13.3 - 17.7 g/dL    Hematocrit 33.2 (L) 40.0 - 53.0 %    MCV 99 78 - 100 fl "    MCH 35.6 (H) 26.5 - 33.0 pg    MCHC 36.1 31.5 - 36.5 g/dL    RDW 14.2 10.0 - 15.0 %    Platelet Count 202 150 - 450 10e9/L    Diff Method Automated Method     % Neutrophils 68.9 %    % Lymphocytes 20.6 %    % Monocytes 6.1 %    % Eosinophils 3.5 %    % Basophils 0.9 %    % Immature Granulocytes 0.0 %    Nucleated RBCs 0 0 /100    Absolute Neutrophil 1.6 1.6 - 8.3 10e9/L    Absolute Lymphocytes 0.5 (L) 0.8 - 5.3 10e9/L    Absolute Monocytes 0.1 0.0 - 1.3 10e9/L    Absolute Eosinophils 0.1 0.0 - 0.7 10e9/L    Absolute Basophils 0.0 0.0 - 0.2 10e9/L    Abs Immature Granulocytes 0.0 0 - 0.4 10e9/L    Absolute Nucleated RBC 0.0    Comprehensive metabolic panel     Status: Abnormal   Result Value Ref Range    Sodium 140 133 - 144 mmol/L    Potassium 4.0 3.4 - 5.3 mmol/L    Chloride 109 94 - 109 mmol/L    Carbon Dioxide 24 20 - 32 mmol/L    Anion Gap 7 3 - 14 mmol/L    Glucose 93 70 - 99 mg/dL    Urea Nitrogen 16 7 - 30 mg/dL    Creatinine 0.62 (L) 0.66 - 1.25 mg/dL    GFR Estimate >90 >60 mL/min/[1.73_m2]    GFR Estimate If Black >90 >60 mL/min/[1.73_m2]    Calcium 8.9 8.5 - 10.1 mg/dL    Bilirubin Total 0.8 0.2 - 1.3 mg/dL    Albumin 4.2 3.4 - 5.0 g/dL    Protein Total 6.5 (L) 6.8 - 8.8 g/dL    Alkaline Phosphatase 69 40 - 150 U/L    ALT 21 0 - 70 U/L    AST 12 0 - 45 U/L       Assessment:  Lazaro Lund is a 20 year old young man with T cell lymphoblastic lymphoma (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  He is being treated per [facility] protocol [id number]. He's had a CRu following Induction with a CR at the end of Consolidation. His course was complicated by extensive bilateral DVT and severe necrotizing pancreatitis requiring cessation of PEG-asparaginase from his treatment.  He comes to clinic today for Day 57 Cycle 4 of Maintenance therapy with an LP (make up from day 29).     Overall, he has been doing well. He is currently on 43% full dose of 6MP with allopurinol for correction of skewed 6-MP metabolism and 100% methotrexate. ANC  has come down nicely since increasing his dose last month. His ANC is 1.6 today. Mouth sores have resolved. Previously noted chest pain has also resolved.    Plan:   1) Labs reviewed with Lazaro. Continue with currently prescribed 43% 6MP (50 mg 3 days/week and 75 mg 4 days/week) and 100% Methotrexate (16 tabs). Refills today. Proceed with chemotherapy and LP.  2) Start prednisone burst. Refill today.  3) Protonix during steroid bursts. Refill today.  4) Most recent Vit D level was normal however Lazaro should continue on Vit D 1000IU daily  5) Bactrim for PCP prophylaxis. Refill today.  6) Continue to check CMP and TGNs monthly moving forward. TGNs appropriate at D29 and pending from today.  7) Lazaro will need COVID testing prior to LPs moving forward.  He prefers to come the day of, have it done in sedation and then come to clinic for chemo while he is waiting for results.   8) RTC in 4 weeks for D1 of Cycle 5 including LP with IT chemo, vincristine, lab monitoring and exam    Marie Damon CNP    Total time spent on the following services on the date of the encounter:  -- Ordering medications, test, procedures, chemotherapy  -- Interpretation of labs and imaging    -- Performing a medically appropriate examination  -- Counseling and educating the patient/family/caregiver  -- Documenting clinical information in the electronic  -- Communicating results to the patient/family/caregiver  -- Care coordination  -- Total time spent: 40 minutes

## 2021-02-23 NOTE — PROGRESS NOTES
[facility]  PEDIATRIC HEMATOLOGY/ONCOLOGY   SOCIAL WORK PROGRESS NOTE      DATA:     SW met with Lazaro, introduced self and role as caseloads are transitioning. Provided contact information. Carmelita reports he's coping well. Continues to live with his grandmother, his mother is providing his transport to appointment today, but he doesn't mind coming to appointments alone. Lazaro is working full time at [employer] as a . He not longer qualifies for Social Security, as he's over income. Lazaro notes he needs to reach out to MA to update them on his employment change, noted if he's over income for MA to pursue MA EPD. Will reach out with needs.     INTERVENTION:     Supportive visit, engaging pt and family in adjustment counseling.  Provided family with resources.    ASSESSMENT:     Pt and family easily engaged with SW. Mood appears stable. Affect appropriate. Appear to be coping with treatment and diagnosis well. Strong family support. Adequately connected to resources.     PLAN:     Social work will continue to assess needs and provide ongoing psychosocial support and access to resources.     CHEY Jones, [facility]  Pediatric Hem/Onc   Phone: [phone]  Pager: [phone]

## 2021-02-23 NOTE — DISCHARGE INSTRUCTIONS
Home Instructions for Your Child after Sedation  Today your child received (medicine):  Fentanyl, Versed and Precedex  Please keep this form with your health records  Your child may be more sleepy and irritable today than normal. Also, an adult should stay with your child for the rest of the day. The medicine may make the child dizzy. Avoid activities that require balance (bike riding, skating, climbing stairs, walking).  Remember:    When your child wants to eat again, start with liquids (juice, soda pop, Popsicles). If your child feels well enough, you may try a regular diet. It is best to offer light meals for the first 24 hours.    If your child has nausea (feels sick to the stomach) or vomiting (throws up), give small amounts of clear liquids (7-Up, Sprite, apple juice or broth). Fluids are more important than food until your child is feeling better.    Wait 24 hours before giving medicine that contains alcohol. This includes liquid cold, cough and allergy medicines (Robitussin, Vicks Formula 44 for children, Benadryl, Chlor-Trimeton).    If you will leave your child with a , give the sitter a copy of these instructions.  Call your doctor if:    You have questions about the test results.    Your child vomits (throws up) more than two times.    Your child is very fussy or irritable.    You have trouble waking your child.     If your child has trouble breathing, call 911.  If you have any questions or concerns, please call:  Pediatric Sedation Unit [phone]  Pediatric clinic  [phone]  [facility]  [phone] (ask for the anesthesiologist on call)  Emergency department [phone]  [facility] toll-free number [phone] (Monday--Friday, 8 a.m. to 4:30 p.m.)  I understand these instructions. I have all of my personal belongings.      [facility]   [phone]    Care post Lumbar Puncture     Do not remove bandage/dressing for 24 hours -- after this time they can be  removed    No bath, shower or soaking of the dressing for 24 hours    Activity as tolerated by the patient    Diet as able to tolerated    May use Tylenol as needed for pain control -- DO NOT use Ibuprofen    Can apply icepack to the site for discomfort -- no more than 10 minutes at a time    If bleeding presents apply pressure for 5 minutes    Call [phone] ask for Peds BMT/Hem/Onc fellow on call if complications arise including:    persistent bleeding    fever greater than 100.5    Pain    Lumbar punctures can cause headache. If the pain is not controlled with Tylenol (acetaminophen) please call the Peds BMT/Hem/Onc fellow on call

## 2021-02-23 NOTE — LETTER
2/23/2021      RE: Lazaro Lund  [address] [facility] [address]       A Lumbar Puncture was performed in the Pediatric Sedation Suite. Informed consent was obtained prior to the procedure. Lazaro Lund was identified by facial recognition and ID arm band. A time-out was performed. Lazaro Lund was then placed in the left lateral decubitus position and the lumbosacral area was sterily prepped using Chloraprep followed by drape placement. Anatomic landmarks were identified by palpation. Then, a 22 gauge, 3.5 inch Slava spinal needle was easily inserted into the L3/L4 interspace. On the first attempt approximately 3 mL of clear and colorless cerebrospinal fluid was obtained to be sent to the lab for cell count analysis and cytospin. Following that, 15mg of intrathecal methotrexate in 6 mL of preservative-free normal saline was infused without resistance. The needle was removed and a Band-Aid applied. Lazaro Lund tolerated this procedure very well.      Procedure was performed by Marie Damon CNP.  I was present for and supervised the entire procedure.   TATY West APRN CNP

## 2021-03-02 LAB
6-TGN ENTSUB RBC: 828 PMOL/8X10(8)RBC (ref 235–450)
6MMP ENTSUB RBC: <438 PMOL/8X10(8)RBC

## 2021-03-02 RX ORDER — SODIUM CHLORIDE 9 MG/ML
200 INJECTION, SOLUTION INTRAVENOUS CONTINUOUS PRN
Status: CANCELLED | OUTPATIENT
Start: 2021-04-20

## 2021-03-02 RX ORDER — DIPHENHYDRAMINE HYDROCHLORIDE 50 MG/ML
50 INJECTION INTRAMUSCULAR; INTRAVENOUS
Status: CANCELLED
Start: 2021-04-20

## 2021-03-02 RX ORDER — DIPHENHYDRAMINE HYDROCHLORIDE 50 MG/ML
50 INJECTION INTRAMUSCULAR; INTRAVENOUS
Status: CANCELLED
Start: 2021-03-23

## 2021-03-02 RX ORDER — EPINEPHRINE 1 MG/ML
0.3 INJECTION, SOLUTION, CONCENTRATE INTRAVENOUS EVERY 5 MIN PRN
Status: CANCELLED | OUTPATIENT
Start: 2021-05-18

## 2021-03-02 RX ORDER — ALBUTEROL SULFATE 0.83 MG/ML
2.5 SOLUTION RESPIRATORY (INHALATION)
Status: CANCELLED | OUTPATIENT
Start: 2021-05-18

## 2021-03-02 RX ORDER — SODIUM CHLORIDE 9 MG/ML
200 INJECTION, SOLUTION INTRAVENOUS CONTINUOUS PRN
Status: CANCELLED | OUTPATIENT
Start: 2021-05-18

## 2021-03-02 RX ORDER — SODIUM CHLORIDE 9 MG/ML
200 INJECTION, SOLUTION INTRAVENOUS CONTINUOUS PRN
Status: CANCELLED | OUTPATIENT
Start: 2021-03-23

## 2021-03-02 RX ORDER — EPINEPHRINE 1 MG/ML
0.3 INJECTION, SOLUTION, CONCENTRATE INTRAVENOUS EVERY 5 MIN PRN
Status: CANCELLED | OUTPATIENT
Start: 2021-03-23

## 2021-03-02 RX ORDER — ALBUTEROL SULFATE 90 UG/1
1-2 AEROSOL, METERED RESPIRATORY (INHALATION)
Status: CANCELLED
Start: 2021-05-18

## 2021-03-02 RX ORDER — METHYLPREDNISOLONE SODIUM SUCCINATE 125 MG/2ML
125 INJECTION, POWDER, LYOPHILIZED, FOR SOLUTION INTRAMUSCULAR; INTRAVENOUS
Status: CANCELLED | OUTPATIENT
Start: 2021-04-20

## 2021-03-02 RX ORDER — METHYLPREDNISOLONE SODIUM SUCCINATE 125 MG/2ML
125 INJECTION, POWDER, LYOPHILIZED, FOR SOLUTION INTRAMUSCULAR; INTRAVENOUS
Status: CANCELLED | OUTPATIENT
Start: 2021-03-23

## 2021-03-02 RX ORDER — DIPHENHYDRAMINE HYDROCHLORIDE 50 MG/ML
50 INJECTION INTRAMUSCULAR; INTRAVENOUS
Status: CANCELLED
Start: 2021-05-18

## 2021-03-02 RX ORDER — ALBUTEROL SULFATE 90 UG/1
1-2 AEROSOL, METERED RESPIRATORY (INHALATION)
Status: CANCELLED
Start: 2021-04-20

## 2021-03-02 RX ORDER — ALBUTEROL SULFATE 0.83 MG/ML
2.5 SOLUTION RESPIRATORY (INHALATION)
Status: CANCELLED | OUTPATIENT
Start: 2021-03-23

## 2021-03-02 RX ORDER — ALBUTEROL SULFATE 90 UG/1
1-2 AEROSOL, METERED RESPIRATORY (INHALATION)
Status: CANCELLED
Start: 2021-03-23

## 2021-03-02 RX ORDER — ALBUTEROL SULFATE 0.83 MG/ML
2.5 SOLUTION RESPIRATORY (INHALATION)
Status: CANCELLED | OUTPATIENT
Start: 2021-04-20

## 2021-03-02 RX ORDER — EPINEPHRINE 1 MG/ML
0.3 INJECTION, SOLUTION, CONCENTRATE INTRAVENOUS EVERY 5 MIN PRN
Status: CANCELLED | OUTPATIENT
Start: 2021-04-20

## 2021-03-02 RX ORDER — METHYLPREDNISOLONE SODIUM SUCCINATE 125 MG/2ML
125 INJECTION, POWDER, LYOPHILIZED, FOR SOLUTION INTRAMUSCULAR; INTRAVENOUS
Status: CANCELLED | OUTPATIENT
Start: 2021-05-18

## 2021-03-23 ENCOUNTER — OFFICE VISIT (OUTPATIENT)
Dept: PEDIATRIC HEMATOLOGY/ONCOLOGY | Facility: CLINIC | Age: 21
End: 2021-03-23
Attending: NURSE PRACTITIONER
Payer: COMMERCIAL

## 2021-03-23 ENCOUNTER — HOSPITAL ENCOUNTER (OUTPATIENT)
Facility: CLINIC | Age: 21
Discharge: HOME | End: 2021-03-23
Attending: RADIOLOGY | Admitting: RADIOLOGY
Payer: COMMERCIAL

## 2021-03-23 ENCOUNTER — INFUSION THERAPY VISIT (OUTPATIENT)
Dept: INFUSION THERAPY | Facility: CLINIC | Age: 21
End: 2021-03-23
Attending: NURSE PRACTITIONER
Payer: COMMERCIAL

## 2021-03-23 ENCOUNTER — ANESTHESIA EVENT (OUTPATIENT)
Dept: PEDIATRICS | Facility: CLINIC | Age: 21
End: 2021-03-23
Payer: COMMERCIAL

## 2021-03-23 ENCOUNTER — ANESTHESIA (OUTPATIENT)
Dept: PEDIATRICS | Facility: CLINIC | Age: 21
End: 2021-03-23
Payer: COMMERCIAL

## 2021-03-23 VITALS
HEART RATE: 72 BPM | RESPIRATION RATE: 24 BRPM | SYSTOLIC BLOOD PRESSURE: 108 MMHG | TEMPERATURE: 98.3 F | DIASTOLIC BLOOD PRESSURE: 61 MMHG | OXYGEN SATURATION: 100 %

## 2021-03-23 VITALS
BODY MASS INDEX: 22.29 KG/M2 | HEIGHT: 73 IN | HEART RATE: 61 BPM | WEIGHT: 168.21 LBS | RESPIRATION RATE: 16 BRPM | TEMPERATURE: 98.1 F | SYSTOLIC BLOOD PRESSURE: 100 MMHG | OXYGEN SATURATION: 97 % | DIASTOLIC BLOOD PRESSURE: 50 MMHG

## 2021-03-23 DIAGNOSIS — K85.31 DRUG-INDUCED ACUTE PANCREATITIS WITH UNINFECTED NECROSIS: ICD-10-CM

## 2021-03-23 DIAGNOSIS — C83.50 T LYMPHOBLASTIC LYMPHOMA (H): Primary | ICD-10-CM

## 2021-03-23 LAB
ALBUMIN SERPL-MCNC: 3.9 G/DL (ref 3.4–5)
ALP SERPL-CCNC: 58 U/L (ref 40–150)
ALT SERPL W P-5'-P-CCNC: 22 U/L (ref 0–70)
ANION GAP SERPL CALCULATED.3IONS-SCNC: <1 MMOL/L (ref 3–14)
APPEARANCE CSF: CLEAR
AST SERPL W P-5'-P-CCNC: 9 U/L (ref 0–45)
BASOPHILS # BLD AUTO: 0 10E9/L (ref 0–0.2)
BASOPHILS NFR BLD AUTO: 0.9 %
BILIRUB SERPL-MCNC: 0.6 MG/DL (ref 0.2–1.3)
BUN SERPL-MCNC: 18 MG/DL (ref 7–30)
CALCIUM SERPL-MCNC: 8.6 MG/DL (ref 8.5–10.1)
CHLORIDE SERPL-SCNC: 110 MMOL/L (ref 94–109)
CO2 SERPL-SCNC: 30 MMOL/L (ref 20–32)
COLOR CSF: COLORLESS
CREAT SERPL-MCNC: 0.75 MG/DL (ref 0.66–1.25)
DIFFERENTIAL METHOD BLD: ABNORMAL
EOSINOPHIL # BLD AUTO: 0.1 10E9/L (ref 0–0.7)
EOSINOPHIL NFR BLD AUTO: 4 %
ERYTHROCYTE [DISTWIDTH] IN BLOOD BY AUTOMATED COUNT: 14.4 % (ref 10–15)
GFR SERPL CREATININE-BSD FRML MDRD: >90 ML/MIN/{1.73_M2}
GLUCOSE SERPL-MCNC: 92 MG/DL (ref 70–99)
HCT VFR BLD AUTO: 32.8 % (ref 40–53)
HGB BLD-MCNC: 11.4 G/DL (ref 13.3–17.7)
IMM GRANULOCYTES # BLD: 0 10E9/L (ref 0–0.4)
IMM GRANULOCYTES NFR BLD: 1.3 %
LABORATORY COMMENT REPORT: NORMAL
LYMPHOCYTES # BLD AUTO: 0.4 10E9/L (ref 0.8–5.3)
LYMPHOCYTES NFR BLD AUTO: 17 %
MCH RBC QN AUTO: 35.8 PG (ref 26.5–33)
MCHC RBC AUTO-ENTMCNC: 34.8 G/DL (ref 31.5–36.5)
MCV RBC AUTO: 103 FL (ref 78–100)
MONOCYTES # BLD AUTO: 0.2 10E9/L (ref 0–1.3)
MONOCYTES NFR BLD AUTO: 8.5 %
NEUTROPHILS # BLD AUTO: 1.5 10E9/L (ref 1.6–8.3)
NEUTROPHILS NFR BLD AUTO: 68.3 %
NRBC # BLD AUTO: 0 10*3/UL
NRBC BLD AUTO-RTO: 0 /100
PLATELET # BLD AUTO: 170 10E9/L (ref 150–450)
POTASSIUM SERPL-SCNC: 4 MMOL/L (ref 3.4–5.3)
PROT SERPL-MCNC: 6.3 G/DL (ref 6.8–8.8)
RBC # BLD AUTO: 3.18 10E12/L (ref 4.4–5.9)
RBC # CSF MANUAL: 0 /UL (ref 0–2)
SARS-COV-2 RNA RESP QL NAA+PROBE: NEGATIVE
SODIUM SERPL-SCNC: 141 MMOL/L (ref 133–144)
SPECIMEN SOURCE: NORMAL
TUBE # CSF: 1 #
WBC # BLD AUTO: 2.2 10E9/L (ref 4–11)
WBC # CSF MANUAL: 1 /UL (ref 0–5)

## 2021-03-23 PROCEDURE — G0463 HOSPITAL OUTPT CLINIC VISIT: HCPCS | Mod: 25 | Performed by: NURSE PRACTITIONER

## 2021-03-23 PROCEDURE — 250N000011 HC RX IP 250 OP 636: Performed by: NURSE PRACTITIONER

## 2021-03-23 PROCEDURE — 258N000003 HC RX IP 258 OP 636: Performed by: NURSE ANESTHETIST, CERTIFIED REGISTERED

## 2021-03-23 PROCEDURE — 258N000003 HC RX IP 258 OP 636: Performed by: NURSE PRACTITIONER

## 2021-03-23 PROCEDURE — 85025 COMPLETE CBC W/AUTO DIFF WBC: CPT | Performed by: NURSE PRACTITIONER

## 2021-03-23 PROCEDURE — 89050 BODY FLUID CELL COUNT: CPT | Performed by: NURSE PRACTITIONER

## 2021-03-23 PROCEDURE — 80299 QUANTITATIVE ASSAY DRUG: CPT | Performed by: NURSE PRACTITIONER

## 2021-03-23 PROCEDURE — 250N000011 HC RX IP 250 OP 636: Performed by: NURSE ANESTHETIST, CERTIFIED REGISTERED

## 2021-03-23 PROCEDURE — 999N000131 HC STATISTIC POST-PROCEDURE RECOVERY CARE: Performed by: NURSE PRACTITIONER

## 2021-03-23 PROCEDURE — 96450 CHEMOTHERAPY INTO CNS: CPT | Performed by: NURSE PRACTITIONER

## 2021-03-23 PROCEDURE — 96409 CHEMO IV PUSH SNGL DRUG: CPT

## 2021-03-23 PROCEDURE — U0005 INFEC AGEN DETEC AMPLI PROBE: HCPCS | Performed by: NURSE PRACTITIONER

## 2021-03-23 PROCEDURE — 250N000011 HC RX IP 250 OP 636

## 2021-03-23 PROCEDURE — 999N000141 HC STATISTIC PRE-PROCEDURE NURSING ASSESSMENT: Performed by: NURSE PRACTITIONER

## 2021-03-23 PROCEDURE — 99215 OFFICE O/P EST HI 40 MIN: CPT | Mod: 25 | Performed by: NURSE PRACTITIONER

## 2021-03-23 PROCEDURE — 370N000017 HC ANESTHESIA TECHNICAL FEE, PER MIN: Performed by: NURSE PRACTITIONER

## 2021-03-23 PROCEDURE — 80053 COMPREHEN METABOLIC PANEL: CPT | Performed by: NURSE PRACTITIONER

## 2021-03-23 PROCEDURE — U0003 INFECTIOUS AGENT DETECTION BY NUCLEIC ACID (DNA OR RNA); SEVERE ACUTE RESPIRATORY SYNDROME CORONAVIRUS 2 (SARS-COV-2) (CORONAVIRUS DISEASE [COVID-19]), AMPLIFIED PROBE TECHNIQUE, MAKING USE OF HIGH THROUGHPUT TECHNOLOGIES AS DESCRIBED BY CMS-2020-01-R: HCPCS | Performed by: NURSE PRACTITIONER

## 2021-03-23 RX ORDER — FENTANYL CITRATE 50 UG/ML
INJECTION, SOLUTION INTRAMUSCULAR; INTRAVENOUS
Status: COMPLETED
Start: 2021-03-23 | End: 2021-03-23

## 2021-03-23 RX ORDER — HEPARIN SODIUM (PORCINE) LOCK FLUSH IV SOLN 100 UNIT/ML 100 UNIT/ML
SOLUTION INTRAVENOUS
Status: COMPLETED
Start: 2021-03-23 | End: 2021-03-23

## 2021-03-23 RX ORDER — FENTANYL CITRATE 50 UG/ML
INJECTION, SOLUTION INTRAMUSCULAR; INTRAVENOUS PRN
Status: DISCONTINUED | OUTPATIENT
Start: 2021-03-23 | End: 2021-03-23

## 2021-03-23 RX ORDER — MERCAPTOPURINE 50 MG/1
TABLET ORAL
Qty: 40 TABLET | Refills: 2 | Status: SHIPPED | OUTPATIENT
Start: 2021-03-23 | End: 2021-08-10

## 2021-03-23 RX ORDER — HEPARIN SODIUM,PORCINE 10 UNIT/ML
VIAL (ML) INTRAVENOUS
Status: COMPLETED
Start: 2021-03-23 | End: 2021-03-23

## 2021-03-23 RX ORDER — PANTOPRAZOLE SODIUM 40 MG/1
40 TABLET, DELAYED RELEASE ORAL
Qty: 30 TABLET | Refills: 2 | Status: ON HOLD | OUTPATIENT
Start: 2021-03-23 | End: 2021-06-15

## 2021-03-23 RX ORDER — LIDOCAINE 40 MG/G
CREAM TOPICAL
Status: DISCONTINUED
Start: 2021-03-23 | End: 2021-03-23 | Stop reason: HOSPADM

## 2021-03-23 RX ORDER — HEPARIN SODIUM,PORCINE 10 UNIT/ML
3-6 VIAL (ML) INTRAVENOUS EVERY 24 HOURS
Status: DISCONTINUED | OUTPATIENT
Start: 2021-03-23 | End: 2021-03-23 | Stop reason: HOSPADM

## 2021-03-23 RX ORDER — PREDNISONE 10 MG/1
TABLET ORAL
Qty: 40 TABLET | Refills: 2 | Status: SHIPPED | OUTPATIENT
Start: 2021-03-23 | End: 2021-08-10

## 2021-03-23 RX ORDER — SODIUM CHLORIDE, SODIUM LACTATE, POTASSIUM CHLORIDE, CALCIUM CHLORIDE 600; 310; 30; 20 MG/100ML; MG/100ML; MG/100ML; MG/100ML
INJECTION, SOLUTION INTRAVENOUS CONTINUOUS PRN
Status: DISCONTINUED | OUTPATIENT
Start: 2021-03-23 | End: 2021-03-23

## 2021-03-23 RX ORDER — LIDOCAINE 40 MG/G
CREAM TOPICAL
Status: DISCONTINUED | OUTPATIENT
Start: 2021-03-23 | End: 2021-03-23 | Stop reason: HOSPADM

## 2021-03-23 RX ORDER — HEPARIN SODIUM,PORCINE 10 UNIT/ML
5 VIAL (ML) INTRAVENOUS ONCE
Status: DISCONTINUED | OUTPATIENT
Start: 2021-03-23 | End: 2021-03-23 | Stop reason: HOSPADM

## 2021-03-23 RX ADMIN — METHOTREXATE: 25 INJECTION INTRA-ARTERIAL; INTRAMUSCULAR; INTRATHECAL; INTRAVENOUS at 09:59

## 2021-03-23 RX ADMIN — SODIUM CHLORIDE, SODIUM LACTATE, POTASSIUM CHLORIDE, CALCIUM CHLORIDE: 600; 310; 30; 20 INJECTION, SOLUTION INTRAVENOUS at 09:58

## 2021-03-23 RX ADMIN — Medication 5 ML: at 09:11

## 2021-03-23 RX ADMIN — HEPARIN, PORCINE (PF) 10 UNIT/ML INTRAVENOUS SYRINGE 5 ML: at 09:11

## 2021-03-23 RX ADMIN — MIDAZOLAM 2 MG: 1 INJECTION INTRAMUSCULAR; INTRAVENOUS at 09:55

## 2021-03-23 RX ADMIN — SODIUM CHLORIDE 50 ML: 9 INJECTION, SOLUTION INTRAVENOUS at 09:02

## 2021-03-23 RX ADMIN — FENTANYL CITRATE 25 MCG: 50 INJECTION, SOLUTION INTRAMUSCULAR; INTRAVENOUS at 10:02

## 2021-03-23 RX ADMIN — FENTANYL CITRATE 25 MCG: 50 INJECTION, SOLUTION INTRAMUSCULAR; INTRAVENOUS at 09:55

## 2021-03-23 RX ADMIN — FENTANYL CITRATE 25 MCG: 50 INJECTION, SOLUTION INTRAMUSCULAR; INTRAVENOUS at 09:59

## 2021-03-23 RX ADMIN — VINCRISTINE SULFATE 2 MG: 1 INJECTION, SOLUTION INTRAVENOUS at 09:03

## 2021-03-23 ASSESSMENT — ENCOUNTER SYMPTOMS
APNEA: 0
ROS SKIN COMMENTS: NO ACUTE ISSUES

## 2021-03-23 ASSESSMENT — MIFFLIN-ST. JEOR: SCORE: 1809.87

## 2021-03-23 ASSESSMENT — PAIN SCALES - GENERAL: PAINLEVEL: NO PAIN (0)

## 2021-03-23 NOTE — ANESTHESIA PREPROCEDURE EVALUATION
Anesthesia Pre-Procedure Evaluation    Patient: Lazaro Lund   MRN:     [id number] Gender:   male   Age:    20 year old :      2000        Preoperative Diagnosis: Lymphoma (H) [C85.90]   Procedure(s):  Lumbar puncture with intrathecal Chemotherapy (not CD)     LABS:  CBC:   Lab Results   Component Value Date    WBC 2.3 (L) 2021    WBC 3.0 (L) 2021    HGB 12.0 (L) 2021    HGB 12.4 (L) 2021    HCT 33.2 (L) 2021    HCT 36.0 (L) 2021     2021     2021     BMP:   Lab Results   Component Value Date     2021     2021    POTASSIUM 4.0 2021    POTASSIUM 3.9 2021    CHLORIDE 109 2021    CHLORIDE 108 2021    CO2 24 2021    CO2 28 2021    BUN 16 2021    BUN 16 2021    CR 0.62 (L) 2021    CR 0.82 2021    GLC 93 2021    GLC 83 2021     COAGS:   Lab Results   Component Value Date    PTT 45 (H) 2019    INR 1.09 2019    FIBR 293 2019     POC:   Lab Results   Component Value Date    BGM 87 2019     OTHER:   Lab Results   Component Value Date    LACT 0.4 (L) 2019    MICHAEL 8.9 2021    PHOS 4.2 2019    MAG 2.1 2019    ALBUMIN 4.2 2021    PROTTOTAL 6.5 (L) 2021    ALT 21 2021    AST 12 2021    ALKPHOS 69 2021    BILITOTAL 0.8 2021    LIPASE 21 (L) 2020    AMYLASE 24 (L) 2020    .0 (H) 2019        Preop Vitals    BP Readings from Last 3 Encounters:   21 95/50   02/23/21 109/66   21 122/64    Pulse Readings from Last 3 Encounters:   21 68   21 79   21 80      Resp Readings from Last 3 Encounters:   21 18   21 16   21 16    SpO2 Readings from Last 3 Encounters:   21 100%   21 99%   21 96%      Temp Readings from Last 1 Encounters:   21 36.7  C (98.1  F) (Oral)    Ht Readings from Last 1 Encounters:  "  02/23/21 1.845 m (6' 0.64\")      Wt Readings from Last 1 Encounters:   02/23/21 76.2 kg (167 lb 15.9 oz)    Estimated body mass index is 22.69 kg/m  as calculated from the following:    Height as of 2/23/21: 1.836 m (6' 0.28\").    Weight as of 2/23/21: 76.5 kg (168 lb 10.4 oz).     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      Past Surgical History:   Procedure Laterality Date     BONE MARROW BIOPSY, BONE SPECIMEN, NEEDLE/TROCAR Left 9/1/2019    Procedure: BIOPSY, BONE MARROW;  Surgeon: Heather Lopez MD;  [facility]     INSERT PICC LINE N/A 8/31/2019    Procedure: INSERTION, PICC;  Surgeon: Michell Keith MD;  [facility]     INSERT PORT VASCULAR ACCESS N/A 10/24/2019    Procedure: INSERTION, VASCULAR ACCESS PORT;  Surgeon: Silviano Martins MD;  [facility]      IR CHEST PORT PLACEMENT > 5 YRS OF AGE  10/24/2019     IR CHEST TUBE PLACEMENT NON-TUNNELLED LEFT  8/31/2019     IR PICC PLACEMENT > 5 YRS OF AGE  8/31/2019     IR PORT CHECK RIGHT  11/12/2019     SPINAL PUNCTURE,LUMBAR, INTRATHECAL CHEMO DELIVERY N/A 8/31/2019    Procedure: LUMBAR PUNCTURE, WITH INTRATHECAL CHEMOTHERAPY ADMINISTRATION;  Surgeon: Heather Lopez MD;  [facility]     SPINAL PUNCTURE,LUMBAR, INTRATHECAL CHEMO DELIVERY N/A 9/9/2019    Procedure: Lumbar Puncture With Intrathecal Chemo;  Surgeon: Alexi Hayes MD;  [facility]     SPINAL PUNCTURE,LUMBAR, INTRATHECAL CHEMO DELIVERY N/A 10/10/2019    Procedure: Lumbar puncture with IT " Chemo (CD);  Surgeon: Reynaldo Campuzano MD;  [facility]      SPINAL PUNCTURE,LUMBAR, INTRATHECAL CHEMO DELIVERY N/A 10/17/2019    Procedure: Lumbar puncture with IT Chemo (not CD);  Surgeon: Reynaldo Campuzano MD;  [facility]      SPINAL PUNCTURE,LUMBAR, INTRATHECAL CHEMO DELIVERY N/A 10/24/2019    Procedure: LUMBAR PUNCTURE, WITH INTRATHECAL CHEMOTHERAPY ADMINISTRATION;  Surgeon: Félix Van APRN CNP;  [facility]      SPINAL PUNCTURE,LUMBAR, INTRATHECAL CHEMO DELIVERY N/A 10/31/2019    Procedure: Lumbar puncture with IT Chemo (not CD);  Surgeon: Reynaldo Campuzano MD;  [facility]      SPINAL PUNCTURE,LUMBAR, INTRATHECAL CHEMO DELIVERY N/A 12/19/2019    Procedure: Lumbar puncture with IT Chem (CD);  Surgeon: Félix Van APRN CNP;  [facility]      SPINAL PUNCTURE,LUMBAR, INTRATHECAL CHEMO DELIVERY N/A 12/23/2019    Procedure: Lumbar puncture with IT Chem (CD);  Surgeon: Heather Lopez MD;  [facility]      SPINAL PUNCTURE,LUMBAR, INTRATHECAL CHEMO DELIVERY N/A 1/23/2020    Procedure: Lumbar puncture with IT Chem (CD);  Surgeon: Reynaldo Campuzano MD;  [facility]      SPINAL PUNCTURE,LUMBAR, INTRATHECAL CHEMO DELIVERY N/A 2/20/2020    Procedure: Lumbar puncture with intrathecal Chemotherapy (CD);  Surgeon: Reynaldo Campuzano MD;  [facility]      SPINAL PUNCTURE,LUMBAR, INTRATHECAL CHEMO DELIVERY N/A 3/19/2020    Procedure: Lumbar puncture with intrathecal Chemotherapy (CD);  Surgeon: Reynaldo Campuzano MD;  [facility]      SPINAL PUNCTURE,LUMBAR, INTRATHECAL CHEMO DELIVERY N/A 3/26/2020    Procedure: Lumbar puncture with intrathecal Chemotherapy (not CD);  Surgeon: Todd Mercado MD;  [facility]      SPINAL PUNCTURE,LUMBAR, INTRATHECAL CHEMO DELIVERY N/A 4/23/2020    Procedure: Lumbar puncture with  intrathecal Chemotherapy (CD);  Surgeon: Marleny Brewer MD;  [facility]      SPINAL PUNCTURE,LUMBAR, INTRATHECAL CHEMO DELIVERY N/A 5/14/2020    Procedure: Lumbar puncture with intrathecal Chemotherapy (not CD);  Surgeon: Todd Mercado MD;  [facility]      SPINAL PUNCTURE,LUMBAR, INTRATHECAL CHEMO DELIVERY N/A 7/9/2020    Procedure: Lumbar puncture with intrathecal Chemotherapy (CD);  Surgeon: Todd Mercado MD;  [facility]      SPINAL PUNCTURE,LUMBAR, INTRATHECAL CHEMO DELIVERY N/A 8/6/2020    Procedure: Lumbar puncture with intrathecal Chemotherapy (not CD);  Surgeon: Todd Mercado MD;  [facility]      SPINAL PUNCTURE,LUMBAR, INTRATHECAL CHEMO DELIVERY N/A 10/6/2020    Procedure: Lumbar puncture with intrathecal Chemotherapy (not CD);  Surgeon: Félix Van APRN CNP;  [facility]      SPINAL PUNCTURE,LUMBAR, INTRATHECAL CHEMO DELIVERY N/A 11/3/2020    Procedure: Lumbar puncture with intrathecal Chemotherapy (not CD);  Surgeon: Reynaldo Campuzano MD;  [facility]      SPINAL PUNCTURE,LUMBAR, INTRATHECAL CHEMO DELIVERY N/A 12/29/2020    Procedure: Lumbar puncture with intrathecal Chemotherapy (CD);  Surgeon: Reynaldo Campuzano MD;  [facility]      SPINAL PUNCTURE,LUMBAR, INTRATHECAL CHEMO DELIVERY N/A 2/23/2021    Procedure: Lumbar puncture with intrathecal Chemotherapy (not CD);  Surgeon: Félix Van APRN CNP;  [facility]      THORACENTESIS N/A 8/31/2019    Procedure: Thoracentesis;  Surgeon: Michell Keith MD;  [facility]      Allergies   Allergen Reactions     Asparaginase Derivatives Other (See Comments)     Severe pancreatitis     No Known Drug Allergies         Anesthesia Evaluation    ROS/Med Hx    No history of anesthetic complications  (-) malignant hyperthermia  Comments: Lazaro has had several anesthetics for his  complicated ALL - has had mediastinal mass, pleuritis, pericarditis, DVT, pancreatitis, hepatotoxicity. Now recovering and getting LPs with intrathecal chemoRx.     Met with Lazaro - he is NPO and has done well with anesthesia. His COVID test could not be done even though 2 attempts were taken. He denies symptoms of COVID. Denies exposure to someone with known COVID.     Cardiovascular Findings   Comments: Stable.     Neuro Findings   Comments: stable    Pulmonary Findings   (-) asthma and apnea    HENT Findings   Comments: stable    Skin Findings   Comments: No acute issues      GI/Hepatic/Renal Findings   (-) GERD  Comments: History of drug induced hepatotoxicity;     Endocrine/Metabolic Findings   (+) chronic steroid use and adrenal disease      Genetic/Syndrome Findings - negative genetics/syndromes ROS    Hematology/Oncology Findings   (+) cancer  Comments: History of DVT    Additional Notes  Allergies:   -- Asparaginase Derivatives -- Other (See Comments)    --  Severe pancreatitis   -- No Known Drug Allergies        Current Outpatient Medications:  allopurinol (ZYLOPRIM) 100 MG tablet  diphenhydrAMINE (BENADRYL) 25 MG capsule  lidocaine-prilocaine (EMLA) 2.5-2.5 % external cream  mercaptopurine (PURINETHOL) 50 MG tablet  methotrexate 2.5 MG tablet  ondansetron (ZOFRAN-ODT) 8 MG ODT tab  pantoprazole (PROTONIX) 40 MG EC tablet  polyethylene glycol (MIRALAX/GLYCOLAX) packet  predniSONE (DELTASONE) 10 MG tablet  sennosides (SENOKOT) 8.6 MG tablet  sulfamethoxazole-trimethoprim (BACTRIM DS) 800-160 MG tablet  Vitamin D, Cholecalciferol, 25 MCG (1000 UT) TABS            PHYSICAL EXAM:   Mental Status/Neuro: A/A/O   Airway: Facies: Feasible  Mallampati: I  Mouth/Opening: Full  TM distance: > 6 cm  Neck ROM: Full   Respiratory: Auscultation: CTAB     Resp. Rate: Normal     Resp. Effort: Normal      CV: Rhythm: Regular  Rate: Age appropriate  Heart: Normal Sounds  Edema: None   Comments: Dental - no acute issues                      Anesthesia Plan    ASA Status:  3   NPO Status:  NPO Appropriate    Anesthesia Type: MAC.     - Reason for MAC: straight local not clinically adequate (he requests sedation and analgesia with monitoring)   Induction: Intravenous.   Maintenance: TIVA.        Consents    Anesthesia Plan(s) and associated risks, benefits, and realistic alternatives discussed. Questions answered and patient/representative(s) expressed understanding.     - Discussed with:  Patient      - Extended Intubation/Ventilatory Support Discussed: No.      - Patient is DNR/DNI Status: No    Use of blood products discussed: No .     Postoperative Care    Pain management: Oral pain medications.        Comments:    He requests anxiolysis and analgesics during procedure. Procedures and risks explained. He understood and consented. Qs answered.          Miles Liu MD

## 2021-03-23 NOTE — DISCHARGE INSTRUCTIONS
[facility]   [phone]    Care post Lumbar Puncture     Do not remove bandage/dressing for 24 hours -- after this time they can be removed    No bath, shower or soaking of the dressing for 24 hours    Activity as tolerated by the patient    Diet as able to tolerated    May use Tylenol as needed for pain control -- DO NOT use Ibuprofen    Can apply icepack to the site for discomfort -- no more than 10 minutes at a time    If bleeding presents apply pressure for 5 minutes    Call [phone] ask for Peds BMT/Hem/Onc fellow on call if complications arise including:    persistent bleeding    fever greater than 100.5    Pain    Lumbar punctures can cause headache. If the pain is not controlled with Tylenol (acetaminophen) please call the Peds BMT/Hem/Onc fellow on call      * Recovery After Conscious Sedation (Adult)  We gave you medicine by vein to make you sleepy or relaxed during your procedure. This may have included both a pain medicine and sleeping medicine. Most of the effects have worn off. But you may still feel sleepy for the next 6 to 8 hours.  Home care  Follow these guidelines when you get home:    You may feel sleepy and clumsy and have poor balance for the next few hours.    A responsible adult should stay with you for the next 8 hours. This person should make sure your condition doesn t get worse.    Don't drink any alcohol for the next 24 hours.    Don't drive, operate dangerous machinery, make important business or personal decisions or sign legal documents during the next 24 hours.    You may vomit (throw up) if you eat too soon after the procedure. If this happens, drink small amounts of water, juice or clear broth. Wait to try solid food until you no longer have nausea (upset stomach).  Note: Your care team may tell you not to take any medicine by mouth for pain or sleep in the next 4 hours. These medicines may react with the medicines you had in the hospital. This could cause a much  stronger response than usual.  Follow-up care  Follow up with your care team if you are not alert and back to your usual level of activity within 12 hours.  When to seek medical advice  Call your care team right away if any of these occur:    You still feel sleepy or clumsy after 12 hours, or your sleepiness gets worse    Weakness or dizziness gets worse    Repeated vomiting    If you can't be woken up and someone is staying with you, they should call 911.  For informational purposes only. Not to replace the advice of your health care provider.  Copyright   2018 [employer]. All rights reserved.

## 2021-03-23 NOTE — ANESTHESIA POSTPROCEDURE EVALUATION
Patient: Lazaro Lund    Procedure(s):  Lumbar puncture with intrathecal Chemotherapy (not CD)    Diagnosis:Lymphoma (H) [C85.90]  Diagnosis Additional Information: No value filed.    Anesthesia Type:  MAC    Note:  Disposition: Outpatient   Postop Pain Control: Uneventful            Sign Out: Well controlled pain   PONV: No   Neuro/Psych: Uneventful            Sign Out: Acceptable/Baseline neuro status   Airway/Respiratory: Uneventful            Sign Out: Acceptable/Baseline resp. status   CV/Hemodynamics: Uneventful            Sign Out: Acceptable CV status   Other NRE: NONE   DID A NON-ROUTINE EVENT OCCUR? No    Event details/Postop Comments:  Awakening satisfactorily; strong; breathing well; oriented; comfortable; no complaints or complications.          Last vitals:  Vitals:    03/23/21 1030 03/23/21 1045 03/23/21 1100   BP: 90/46 (!) 86/42 100/50   Pulse: 60 57 61   Resp: 18  16   Temp:   36.7  C (98.1  F)   SpO2: 96% 97%        Last vitals prior to Anesthesia Care Transfer:  CRNA VITALS  3/23/2021 0940 - 3/23/2021 1040      3/23/2021             NIBP:  107/66    Pulse:  70    Ht Rate:  70    Temp:  37  C (98.6  F)    SpO2:  100 %    Resp Rate (observed):  15          Electronically Signed By: Miles Liu MD  March 23, 2021  11:32 AM

## 2021-03-23 NOTE — LETTER
3/23/2021      RE: Lazaro Lund  [address] 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 Lazaro presented with acute onset cough and was found to have an anterior mediastinal mass and malignant left-sided pleural effusion.  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  He was admitted to [facility] oncology service 8/30 and started on treatment per [id number].  He had a pleural effusion that required a chest tube and a pericardial effusion that was not drained. His Induction was complicated by cardiac compression secondary to his mass leading to tamponade, this improved through out his hospital stay.  He also had dysphagia that improved with treatment of his mass, swallow study was normal. His course was complicated by extensive bilateral UE DVTs, and he was successfully treated with anticoagulation. His consolidation course was complicated by the development of severe asparaginase induced necrotizing pancreatitis. He became quite ill with SIRS and associated hypotension, he required pressor support in the ICU. As a result, asparaginase was eliminated from his treatment plan. He was in CR status at end of consolidation. During maintenance, he developed hepatotoxicity so allopurinol and dose reduced 6MP were started for correction of skewed 6-MP metabolism.  Lazaro comes to clinic today for Day 1 of his 5th Maintenance cycle.     HPI:   Since his last visit Lazaro has been doing very well. He is currently on jury duty so that has been a bit of a challenge.  But his energy level is good, he is eating well.  No GI upset.  Denies vomiting, diarrhea or constipation. He denies pain.  No skin concerns.  He denies any pain.  No paresthesias or change in gait.    Lazaro denies fever or respiratory symptoms.  He hasn't gotten the COVID vaccine  yet.  It has been challenging for him as some family members believe in conspiracy theories surrounding it which has made it hard.      Review of systems:  The 10 point Review of Systems is negative other than noted in the HPI or here.      PMH:   Past Medical History:   Diagnosis Date     Acute necrotizing pancreatitis 11/07/2019    attributed to asparaginase     Acute pancreatitis due to PEGaspariginase therapy  11/15/2019     DVT of upper extremity (deep vein thrombosis) (H) 09/26/2019    Bilateral      Edema of upper extremity 10/17/2019     Folliculitis 10/17/2019     Migraine 2006    have resolved     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     -- Spinal HA following LP  -- TPMT shows Intermediate activity with normal TPMT/NUDT15 genotype  -- Factor V leiden and prothrombin gene mutation negative  -- Necrotizing pancreatitis secondary to asparaginase  -- former smoke, quit with the use of nicotine patches. Former daily marajuana smoker, now only uses very occasionally  PFMH:   Family History   Problem Relation Age of Onset     No Known Problems Mother      No Known Problems Father      Asthma Brother      Thyroid Cancer Paternal Grandmother      Melanoma Paternal Aunt        Social History:   Social History     Social History Narrative    Lazaro previously lived at home with his dad and step mom in [place] but is now staying with his grandma in [place]. Biological mother is involved in his life. Has 4 step-siblings and 1 biological sibling. 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 Has completed high school. 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  He has been enjoying fishing and video games.        Current Medications:  Current Outpatient Medications on File Prior to Visit   Medication Sig Dispense Refill     allopurinol (ZYLOPRIM) 100 MG tablet Take 1 tablet (100 mg) by  mouth daily 30 tablet 11     diphenhydrAMINE (BENADRYL) 25 MG capsule Take 1-2 capsules (25-50 mg) by mouth every 6 hours as needed (Breakthrough Nausea and Vomiting ) 30 capsule 1     lidocaine-prilocaine (EMLA) 2.5-2.5 % external cream Apply topically as needed for other (prior to port access) 30 g 6     mercaptopurine (PURINETHOL) 50 MG tablet Take 50mg (1 tablet) three days/week and 75mg (1.5 tablets) four days/week. 38 tablet 0     methotrexate 2.5 MG tablet Take 16 tablets (40 mg) by mouth once a week Do not take on Days of LP. 64 tablet 2     ondansetron (ZOFRAN-ODT) 8 MG ODT tab Take 1 tablet (8 mg) by mouth every 8 hours as needed for nausea 20 tablet 6     pantoprazole (PROTONIX) 40 MG EC tablet Take 1 tablet (40 mg) by mouth every morning (before breakfast) During weeks of taking prednisone. 30 tablet 2     polyethylene glycol (MIRALAX/GLYCOLAX) packet Take 17 g by mouth daily 30 packet 0     predniSONE (DELTASONE) 10 MG tablet Take 40mg (4 tabs) twice dialy for 5 days every 4 weeks. 40 tablet 2     sennosides (SENOKOT) 8.6 MG tablet Take 2 tablets by mouth 2 times daily as needed for constipation 60 each 1     sulfamethoxazole-trimethoprim (BACTRIM DS) 800-160 MG tablet Take 1 tablet by mouth Every Mon, Tues two times daily 16 tablet 11     Vitamin D, Cholecalciferol, 25 MCG (1000 UT) TABS Take 1 tablet (1,000 Units) by mouth daily 30 tablet 3     Reviewed medications with Lazaro. Confirmed oral chemo doses, has not missed any doses. He is not taking Vitamin D currently.  Received [id number] influenza vaccine on 12/22/20     Physical Exam:   Temp:  [98.3  F (36.8  C)] 98.3  F (36.8  C)  Pulse:  [72] 72  Resp:  [24] 24  BP: (108)/(61) 108/61  SpO2:  [100 %] 100 %  Wt Readings from Last 4 Encounters:   03/23/21 76.3 kg (168 lb 3.4 oz)   02/23/21 76.2 kg (167 lb 15.9 oz)   02/23/21 76.5 kg (168 lb 10.4 oz)   01/26/21 76.2 kg (167 lb 15.9 oz)     Ht Readings from Last 2 Encounters:   03/23/21 1.843 m (6'  "0.56\")   02/23/21 1.845 m (6' 0.64\")     General: Laazro is alert, interactive and appropriate, well-appearing, in no distress  HEENT: Skull is atrauamatic and normocephalic.  Full head of hair. PERRLA, sclera are non icteric and not injected, EOM are intact. Nares are patent without drainage. Oropharynx clear, no plaques noted. Buccal mucosa and tongue clear. MMM.  Neck:  Supple without lymphadenopathy.   Lymph: There is no cervical, supraclavicular, axillary, lymphadenopathy palpated.  Cardiovascular:  HR is regular, S1, S2 no murmur.  Capillary refill is < 2 seconds.   Respiratory: No cough noted. Breathing effortlessly. Lungs are clear to auscultation throughout.  No crackles or wheezes.  Gastrointestinal: BS present in all quadrants.  Abdomen is soft and flat.  No pain with palpation. No hepatosplenomegaly or masses are palpated.  Skin: No concerning lesions  Neurological:  CN 2-12 grossly intact. Gait is normal.  No issues with balance. Sensation intact in hands and feet.     Musculoskeletal: Good strength and ROM in all extremities.  Strong dorsiflexion at ankles and great toes (5/5) bilaterally without any pain at the Achilles.     Labs:   Results for orders placed or performed in visit on 03/23/21   CBC with platelets differential     Status: Abnormal   Result Value Ref Range    WBC 2.2 (L) 4.0 - 11.0 10e9/L    RBC Count 3.18 (L) 4.4 - 5.9 10e12/L    Hemoglobin 11.4 (L) 13.3 - 17.7 g/dL    Hematocrit 32.8 (L) 40.0 - 53.0 %     (H) 78 - 100 fl    MCH 35.8 (H) 26.5 - 33.0 pg    MCHC 34.8 31.5 - 36.5 g/dL    RDW 14.4 10.0 - 15.0 %    Platelet Count 170 150 - 450 10e9/L    Diff Method Automated Method     % Neutrophils 68.3 %    % Lymphocytes 17.0 %    % Monocytes 8.5 %    % Eosinophils 4.0 %    % Basophils 0.9 %    % Immature Granulocytes 1.3 %    Nucleated RBCs 0 0 /100    Absolute Neutrophil 1.5 (L) 1.6 - 8.3 10e9/L    Absolute Lymphocytes 0.4 (L) 0.8 - 5.3 10e9/L    Absolute Monocytes 0.2 0.0 - 1.3 " 10e9/L    Absolute Eosinophils 0.1 0.0 - 0.7 10e9/L    Absolute Basophils 0.0 0.0 - 0.2 10e9/L    Abs Immature Granulocytes 0.0 0 - 0.4 10e9/L    Absolute Nucleated RBC 0.0    Comprehensive metabolic panel     Status: Abnormal   Result Value Ref Range    Sodium 141 133 - 144 mmol/L    Potassium 4.0 3.4 - 5.3 mmol/L    Chloride 110 (H) 94 - 109 mmol/L    Carbon Dioxide 30 20 - 32 mmol/L    Anion Gap <1 (L) 3 - 14 mmol/L    Glucose 92 70 - 99 mg/dL    Urea Nitrogen 18 7 - 30 mg/dL    Creatinine 0.75 0.66 - 1.25 mg/dL    GFR Estimate >90 >60 mL/min/[1.73_m2]    GFR Estimate If Black >90 >60 mL/min/[1.73_m2]    Calcium 8.6 8.5 - 10.1 mg/dL    Bilirubin Total 0.6 0.2 - 1.3 mg/dL    Albumin 3.9 3.4 - 5.0 g/dL    Protein Total 6.3 (L) 6.8 - 8.8 g/dL    Alkaline Phosphatase 58 40 - 150 U/L    ALT 22 0 - 70 U/L    AST 9 0 - 45 U/L     The following tests were ordered and interpreted by me today:  -- CBC with differential  -- CMP    A Lumbar Puncture was performed in the Pediatric Sedation Suite. Informed consent was obtained prior to the procedure. Lazaro Lund was identified by facial recognition and ID arm band. A time-out was performed. Lazaro Lund was then placed in the left lateral decubitus position and the lumbosacral area was sterily prepped using Chloraprep followed by drape placement. Anatomic landmarks were identified by palpation. Then, a 22 gauge, 3.5 inch Slava spinal needle was easily inserted into the L3/L4 interspace. On the first attempt approximately 3 mL of clear and colorless cerebrospinal fluid was obtained to be sent to the lab for cell count analysis and cytospin. Following that, 15mg of intrathecal methotrexate in 6 mL of preservative-free normal saline was infused without resistance. The needle was removed and a Band-Aid applied. Lazaro Lund tolerated this procedure very well.      Assessment:  Lazaro Lund is a 20 year old young man with T cell lymphoblastic lymphoma (marrow and  CNS negative).  He is being treated per [facility] protocol [id number]. He's had a CRu following Induction with a CR at the end of Consolidation. His course was complicated by extensive bilateral DVT and severe necrotizing pancreatitis requiring cessation of PEG-asparaginase from his treatment.  He comes to clinic today for Day 1 of Cycle 5 of Maintenance therapy.    Overall, he has been doing well. He is currently on 43% full dose of 6MP with allopurinol for correction of skewed 6-MP metabolism and 100% methotrexate. ANC is in target range.  He hasn't gotten the COVID vaccine yet.    Plan:   1) Labs reviewed with Lazaro. Continue with currently prescribed 43% 6MP (50 mg 3 days/week and 75 mg 4 days/week) and 100% Methotrexate (16 tabs). Proceed with chemotherapy and LP.  2) Start prednisone burst.   3) Protonix during steroid bursts.   4) Most recent Vit D level was normal however Lazaro should continue on Vit D 1000IU daily  5) Bactrim for PCP prophylaxis.   6) Continue to check CMP and TGNs monthly moving forward.  7) Lazaro will need COVID testing prior to LPs moving forward.  He prefers to come the day of, have it done in sedation and then come to clinic for chemo while he is waiting for results.   8) Discussed COVID vaccine in detail.  Lazaro is willing to get vaccinated.  Discussed different vaccines and that he can schedule it through [facility].  I would like him to avoid vaccination during his prednisone burst or for the week after in order to maximize his immune response.   9) RTC in 4 weeks for D29 of Cycle 5 including LP with IT chemo (this is a make up LP that was missed earlier in therapy).    Félix Van, CNP    Total time spent on the following services on the date of the encounter:  -- Ordering medications, test, procedures, chemotherapy  -- Interpretation of labs and imaging    -- Performing a medically appropriate examination  -- Counseling and educating the patient/family/caregiver  -- Documenting  clinical information in the electronic  -- Communicating results to the patient/family/caregiver  -- Care coordination  -- Total time spent: 50 minutes      YOHAN Dunn CNP

## 2021-03-23 NOTE — H&P (VIEW-ONLY)
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 Lazaro presented with acute onset cough and was found to have an anterior mediastinal mass and malignant left-sided pleural effusion.  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  He was admitted to [facility] oncology service 8/30 and started on treatment per [id number].  He had a pleural effusion that required a chest tube and a pericardial effusion that was not drained. His Induction was complicated by cardiac compression secondary to his mass leading to tamponade, this improved through out his hospital stay.  He also had dysphagia that improved with treatment of his mass, swallow study was normal. His course was complicated by extensive bilateral UE DVTs, and he was successfully treated with anticoagulation. His consolidation course was complicated by the development of severe asparaginase induced necrotizing pancreatitis. He became quite ill with SIRS and associated hypotension, he required pressor support in the ICU. As a result, asparaginase was eliminated from his treatment plan. He was in CR status at end of consolidation. During maintenance, he developed hepatotoxicity so allopurinol and dose reduced 6MP were started for correction of skewed 6-MP metabolism.  Lazaro comes to clinic today for Day 1 of his 5th Maintenance cycle.     HPI:   Since his last visit Lazaro has been doing very well. He is currently on jury duty so that has been a bit of a challenge.  But his energy level is good, he is eating well.  No GI upset.  Denies vomiting, diarrhea or constipation. He denies pain.  No skin concerns.  He denies any pain.  No paresthesias or change in gait.    Lazaro denies fever or respiratory symptoms.  He hasn't gotten the COVID vaccine yet.  It has been challenging for him as some family members believe in conspiracy  theories surrounding it which has made it hard.      Review of systems:  The 10 point Review of Systems is negative other than noted in the HPI or here.      PMH:   Past Medical History:   Diagnosis Date     Acute necrotizing pancreatitis 11/07/2019    attributed to asparaginase     Acute pancreatitis due to PEGaspariginase therapy  11/15/2019     DVT of upper extremity (deep vein thrombosis) (H) 09/26/2019    Bilateral      Edema of upper extremity 10/17/2019     Folliculitis 10/17/2019     Migraine 2006    have resolved     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     -- Spinal HA following LP  -- TPMT shows Intermediate activity with normal TPMT/NUDT15 genotype  -- Factor V leiden and prothrombin gene mutation negative  -- Necrotizing pancreatitis secondary to asparaginase  -- former smoke, quit with the use of nicotine patches. Former daily marajuana smoker, now only uses very occasionally  PFMH:   Family History   Problem Relation Age of Onset     No Known Problems Mother      No Known Problems Father      Asthma Brother      Thyroid Cancer Paternal Grandmother      Melanoma Paternal Aunt        Social History:   Social History     Social History Narrative    Lazaro previously lived at home with his dad and step mom in [place] but is now staying with his grandma in [place]. Biological mother is involved in his life. Has 4 step-siblings and 1 biological sibling. 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 Has completed high school. 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  He has been enjoying fishing and video games.        Current Medications:  Current Outpatient Medications on File Prior to Visit   Medication Sig Dispense Refill     allopurinol (ZYLOPRIM) 100 MG tablet Take 1 tablet (100 mg) by mouth daily 30 tablet 11     diphenhydrAMINE (BENADRYL) 25 MG capsule Take 1-2  "capsules (25-50 mg) by mouth every 6 hours as needed (Breakthrough Nausea and Vomiting ) 30 capsule 1     lidocaine-prilocaine (EMLA) 2.5-2.5 % external cream Apply topically as needed for other (prior to port access) 30 g 6     mercaptopurine (PURINETHOL) 50 MG tablet Take 50mg (1 tablet) three days/week and 75mg (1.5 tablets) four days/week. 38 tablet 0     methotrexate 2.5 MG tablet Take 16 tablets (40 mg) by mouth once a week Do not take on Days of LP. 64 tablet 2     ondansetron (ZOFRAN-ODT) 8 MG ODT tab Take 1 tablet (8 mg) by mouth every 8 hours as needed for nausea 20 tablet 6     pantoprazole (PROTONIX) 40 MG EC tablet Take 1 tablet (40 mg) by mouth every morning (before breakfast) During weeks of taking prednisone. 30 tablet 2     polyethylene glycol (MIRALAX/GLYCOLAX) packet Take 17 g by mouth daily 30 packet 0     predniSONE (DELTASONE) 10 MG tablet Take 40mg (4 tabs) twice dialy for 5 days every 4 weeks. 40 tablet 2     sennosides (SENOKOT) 8.6 MG tablet Take 2 tablets by mouth 2 times daily as needed for constipation 60 each 1     sulfamethoxazole-trimethoprim (BACTRIM DS) 800-160 MG tablet Take 1 tablet by mouth Every Mon, Tues two times daily 16 tablet 11     Vitamin D, Cholecalciferol, 25 MCG (1000 UT) TABS Take 1 tablet (1,000 Units) by mouth daily 30 tablet 3     Reviewed medications with Lazaro. Confirmed oral chemo doses, has not missed any doses. He is not taking Vitamin D currently.  Received [id number] influenza vaccine on 12/22/20     Physical Exam:   Temp:  [98.3  F (36.8  C)] 98.3  F (36.8  C)  Pulse:  [72] 72  Resp:  [24] 24  BP: (108)/(61) 108/61  SpO2:  [100 %] 100 %  Wt Readings from Last 4 Encounters:   03/23/21 76.3 kg (168 lb 3.4 oz)   02/23/21 76.2 kg (167 lb 15.9 oz)   02/23/21 76.5 kg (168 lb 10.4 oz)   01/26/21 76.2 kg (167 lb 15.9 oz)     Ht Readings from Last 2 Encounters:   03/23/21 1.843 m (6' 0.56\")   02/23/21 1.845 m (6' 0.64\")     General: Lazaro is alert, interactive and " appropriate, well-appearing, in no distress  HEENT: Skull is atrauamatic and normocephalic.  Full head of hair. PERRLA, sclera are non icteric and not injected, EOM are intact. Nares are patent without drainage. Oropharynx clear, no plaques noted. Buccal mucosa and tongue clear. MMM.  Neck:  Supple without lymphadenopathy.   Lymph: There is no cervical, supraclavicular, axillary, lymphadenopathy palpated.  Cardiovascular:  HR is regular, S1, S2 no murmur.  Capillary refill is < 2 seconds.   Respiratory: No cough noted. Breathing effortlessly. Lungs are clear to auscultation throughout.  No crackles or wheezes.  Gastrointestinal: BS present in all quadrants.  Abdomen is soft and flat.  No pain with palpation. No hepatosplenomegaly or masses are palpated.  Skin: No concerning lesions  Neurological:  CN 2-12 grossly intact. Gait is normal.  No issues with balance. Sensation intact in hands and feet.     Musculoskeletal: Good strength and ROM in all extremities.  Strong dorsiflexion at ankles and great toes (5/5) bilaterally without any pain at the Achilles.     Labs:   Results for orders placed or performed in visit on 03/23/21   CBC with platelets differential     Status: Abnormal   Result Value Ref Range    WBC 2.2 (L) 4.0 - 11.0 10e9/L    RBC Count 3.18 (L) 4.4 - 5.9 10e12/L    Hemoglobin 11.4 (L) 13.3 - 17.7 g/dL    Hematocrit 32.8 (L) 40.0 - 53.0 %     (H) 78 - 100 fl    MCH 35.8 (H) 26.5 - 33.0 pg    MCHC 34.8 31.5 - 36.5 g/dL    RDW 14.4 10.0 - 15.0 %    Platelet Count 170 150 - 450 10e9/L    Diff Method Automated Method     % Neutrophils 68.3 %    % Lymphocytes 17.0 %    % Monocytes 8.5 %    % Eosinophils 4.0 %    % Basophils 0.9 %    % Immature Granulocytes 1.3 %    Nucleated RBCs 0 0 /100    Absolute Neutrophil 1.5 (L) 1.6 - 8.3 10e9/L    Absolute Lymphocytes 0.4 (L) 0.8 - 5.3 10e9/L    Absolute Monocytes 0.2 0.0 - 1.3 10e9/L    Absolute Eosinophils 0.1 0.0 - 0.7 10e9/L    Absolute Basophils 0.0 0.0 -  0.2 10e9/L    Abs Immature Granulocytes 0.0 0 - 0.4 10e9/L    Absolute Nucleated RBC 0.0    Comprehensive metabolic panel     Status: Abnormal   Result Value Ref Range    Sodium 141 133 - 144 mmol/L    Potassium 4.0 3.4 - 5.3 mmol/L    Chloride 110 (H) 94 - 109 mmol/L    Carbon Dioxide 30 20 - 32 mmol/L    Anion Gap <1 (L) 3 - 14 mmol/L    Glucose 92 70 - 99 mg/dL    Urea Nitrogen 18 7 - 30 mg/dL    Creatinine 0.75 0.66 - 1.25 mg/dL    GFR Estimate >90 >60 mL/min/[1.73_m2]    GFR Estimate If Black >90 >60 mL/min/[1.73_m2]    Calcium 8.6 8.5 - 10.1 mg/dL    Bilirubin Total 0.6 0.2 - 1.3 mg/dL    Albumin 3.9 3.4 - 5.0 g/dL    Protein Total 6.3 (L) 6.8 - 8.8 g/dL    Alkaline Phosphatase 58 40 - 150 U/L    ALT 22 0 - 70 U/L    AST 9 0 - 45 U/L     The following tests were ordered and interpreted by me today:  -- CBC with differential  -- CMP    A Lumbar Puncture was performed in the Pediatric Sedation Suite. Informed consent was obtained prior to the procedure. Lazaro Lund was identified by facial recognition and ID arm band. A time-out was performed. Lazaro Lund was then placed in the left lateral decubitus position and the lumbosacral area was sterily prepped using Chloraprep followed by drape placement. Anatomic landmarks were identified by palpation. Then, a 22 gauge, 3.5 inch Slava spinal needle was easily inserted into the L3/L4 interspace. On the first attempt approximately 3 mL of clear and colorless cerebrospinal fluid was obtained to be sent to the lab for cell count analysis and cytospin. Following that, 15mg of intrathecal methotrexate in 6 mL of preservative-free normal saline was infused without resistance. The needle was removed and a Band-Aid applied. Lazaro Lund tolerated this procedure very well.      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  He is being treated per [facility] protocol [id number]. He's had a CRu  following Induction with a CR at the end of Consolidation. His course was complicated by extensive bilateral DVT and severe necrotizing pancreatitis requiring cessation of PEG-asparaginase from his treatment.  He comes to clinic today for Day 1 of Cycle 5 of Maintenance therapy.    Overall, he has been doing well. He is currently on 43% full dose of 6MP with allopurinol for correction of skewed 6-MP metabolism and 100% methotrexate. ANC is in target range.  He hasn't gotten the COVID vaccine yet.    Plan:   1) Labs reviewed with Lazaro. Continue with currently prescribed 43% 6MP (50 mg 3 days/week and 75 mg 4 days/week) and 100% Methotrexate (16 tabs). Proceed with chemotherapy and LP.  2) Start prednisone burst.   3) Protonix during steroid bursts.   4) Most recent Vit D level was normal however Lazaro should continue on Vit D 1000IU daily  5) Bactrim for PCP prophylaxis.   6) Continue to check CMP and TGNs monthly moving forward.  7) Lazaro will need COVID testing prior to LPs moving forward.  He prefers to come the day of, have it done in sedation and then come to clinic for chemo while he is waiting for results.   8) Discussed COVID vaccine in detail.  Lazaro is willing to get vaccinated.  Discussed different vaccines and that he can schedule it through [place].  I would like him to avoid vaccination during his prednisone burst or for the week after in order to maximize his immune response.   9) RTC in 4 weeks for D29 of Cycle 5 including LP with IT chemo (this is a make up LP that was missed earlier in therapy).    Félix Van, CNP    Total time spent on the following services on the date of the encounter:  -- Ordering medications, test, procedures, chemotherapy  -- Interpretation of labs and imaging    -- Performing a medically appropriate examination  -- Counseling and educating the patient/family/caregiver  -- Documenting clinical information in the electronic  -- Communicating results to the  patient/family/caregiver  -- Care coordination  -- Total time spent: 50 minutes

## 2021-03-23 NOTE — ANESTHESIA CARE TRANSFER NOTE
Patient: Lazaro Lund    Procedure(s):  Lumbar puncture with intrathecal Chemotherapy (not CD)    Diagnosis: Lymphoma (H) [C85.90]  Diagnosis Additional Information: No value filed.    Anesthesia Type:   MAC     Note:    Oropharynx: oropharynx clear of all foreign objects  Level of Consciousness: awake  Oxygen Supplementation: room air    Independent Airway: airway patency satisfactory and stable  Dentition: dentition unchanged  Vital Signs Stable: post-procedure vital signs reviewed and stable  Report to RN Given: handoff report given  Patient transferred to:  Recovery    Handoff Report: Identifed the Patient, Identified the Reponsible Provider, Reviewed the pertinent medical history, Discussed the surgical course, Reviewed Intra-OP anesthesia mangement and issues during anesthesia, Set expectations for post-procedure period and Allowed opportunity for questions and acknowledgement of understanding      Vitals: (Last set prior to Anesthesia Care Transfer)  CRNA VITALS  3/23/2021 0940 - 3/23/2021 1014      3/23/2021             NIBP:  107/66    Pulse:  70    Ht Rate:  70    Temp:  37  C (98.6  F)    SpO2:  100 %    Resp Rate (observed):  15        Electronically Signed By: YOHAN Epps CRNA  March 23, 2021  10:14 AM

## 2021-03-23 NOTE — PROGRESS NOTES
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 Lazaro presented with acute onset cough and was found to have an anterior mediastinal mass and malignant left-sided pleural effusion.  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  He was admitted to [facility] oncology service 8/30 and started on treatment per [id number].  He had a pleural effusion that required a chest tube and a pericardial effusion that was not drained. His Induction was complicated by cardiac compression secondary to his mass leading to tamponade, this improved through out his hospital stay.  He also had dysphagia that improved with treatment of his mass, swallow study was normal. His course was complicated by extensive bilateral UE DVTs, and he was successfully treated with anticoagulation. His consolidation course was complicated by the development of severe asparaginase induced necrotizing pancreatitis. He became quite ill with SIRS and associated hypotension, he required pressor support in the ICU. As a result, asparaginase was eliminated from his treatment plan. He was in CR status at end of consolidation. During maintenance, he developed hepatotoxicity so allopurinol and dose reduced 6MP were started for correction of skewed 6-MP metabolism.  Lazaro comes to clinic today for Day 1 of his 5th Maintenance cycle.     HPI:   Since his last visit Lazaro has been doing very well. He is currently on jury duty so that has been a bit of a challenge.  But his energy level is good, he is eating well.  No GI upset.  Denies vomiting, diarrhea or constipation. He denies pain.  No skin concerns.  He denies any pain.  No paresthesias or change in gait.    Lazaro denies fever or respiratory symptoms.  He hasn't gotten the COVID vaccine yet.  It has been challenging for him as some family members believe in conspiracy  theories surrounding it which has made it hard.      Review of systems:  The 10 point Review of Systems is negative other than noted in the HPI or here.      PMH:   Past Medical History:   Diagnosis Date     Acute necrotizing pancreatitis 11/07/2019    attributed to asparaginase     Acute pancreatitis due to PEGaspariginase therapy  11/15/2019     DVT of upper extremity (deep vein thrombosis) (H) 09/26/2019    Bilateral      Edema of upper extremity 10/17/2019     Folliculitis 10/17/2019     Migraine 2006    have resolved     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     -- Spinal HA following LP  -- TPMT shows Intermediate activity with normal TPMT/NUDT15 genotype  -- Factor V leiden and prothrombin gene mutation negative  -- Necrotizing pancreatitis secondary to asparaginase  -- former smoke, quit with the use of nicotine patches. Former daily marajuana smoker, now only uses very occasionally  PFMH:   Family History   Problem Relation Age of Onset     No Known Problems Mother      No Known Problems Father      Asthma Brother      Thyroid Cancer Paternal Grandmother      Melanoma Paternal Aunt        Social History:   Social History     Social History Narrative    Lazaro previously lived at home with his dad and step mom in [place] but is now staying with his grandma in [place]. Biological mother is involved in his life. Has 4 step-siblings and 1 biological sibling. 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 Has completed high school. 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  He has been enjoying fishing and video games.        Current Medications:  Current Outpatient Medications on File Prior to Visit   Medication Sig Dispense Refill     allopurinol (ZYLOPRIM) 100 MG tablet Take 1 tablet (100 mg) by mouth daily 30 tablet 11     diphenhydrAMINE (BENADRYL) 25 MG capsule Take 1-2  "capsules (25-50 mg) by mouth every 6 hours as needed (Breakthrough Nausea and Vomiting ) 30 capsule 1     lidocaine-prilocaine (EMLA) 2.5-2.5 % external cream Apply topically as needed for other (prior to port access) 30 g 6     mercaptopurine (PURINETHOL) 50 MG tablet Take 50mg (1 tablet) three days/week and 75mg (1.5 tablets) four days/week. 38 tablet 0     methotrexate 2.5 MG tablet Take 16 tablets (40 mg) by mouth once a week Do not take on Days of LP. 64 tablet 2     ondansetron (ZOFRAN-ODT) 8 MG ODT tab Take 1 tablet (8 mg) by mouth every 8 hours as needed for nausea 20 tablet 6     pantoprazole (PROTONIX) 40 MG EC tablet Take 1 tablet (40 mg) by mouth every morning (before breakfast) During weeks of taking prednisone. 30 tablet 2     polyethylene glycol (MIRALAX/GLYCOLAX) packet Take 17 g by mouth daily 30 packet 0     predniSONE (DELTASONE) 10 MG tablet Take 40mg (4 tabs) twice dialy for 5 days every 4 weeks. 40 tablet 2     sennosides (SENOKOT) 8.6 MG tablet Take 2 tablets by mouth 2 times daily as needed for constipation 60 each 1     sulfamethoxazole-trimethoprim (BACTRIM DS) 800-160 MG tablet Take 1 tablet by mouth Every Mon, Tues two times daily 16 tablet 11     Vitamin D, Cholecalciferol, 25 MCG (1000 UT) TABS Take 1 tablet (1,000 Units) by mouth daily 30 tablet 3     Reviewed medications with Lazaro. Confirmed oral chemo doses, has not missed any doses. He is not taking Vitamin D currently.  Received [id number] influenza vaccine on 12/22/20     Physical Exam:   Temp:  [98.3  F (36.8  C)] 98.3  F (36.8  C)  Pulse:  [72] 72  Resp:  [24] 24  BP: (108)/(61) 108/61  SpO2:  [100 %] 100 %  Wt Readings from Last 4 Encounters:   03/23/21 76.3 kg (168 lb 3.4 oz)   02/23/21 76.2 kg (167 lb 15.9 oz)   02/23/21 76.5 kg (168 lb 10.4 oz)   01/26/21 76.2 kg (167 lb 15.9 oz)     Ht Readings from Last 2 Encounters:   03/23/21 1.843 m (6' 0.56\")   02/23/21 1.845 m (6' 0.64\")     General: Lazaro is alert, interactive and " appropriate, well-appearing, in no distress  HEENT: Skull is atrauamatic and normocephalic.  Full head of hair. PERRLA, sclera are non icteric and not injected, EOM are intact. Nares are patent without drainage. Oropharynx clear, no plaques noted. Buccal mucosa and tongue clear. MMM.  Neck:  Supple without lymphadenopathy.   Lymph: There is no cervical, supraclavicular, axillary, lymphadenopathy palpated.  Cardiovascular:  HR is regular, S1, S2 no murmur.  Capillary refill is < 2 seconds.   Respiratory: No cough noted. Breathing effortlessly. Lungs are clear to auscultation throughout.  No crackles or wheezes.  Gastrointestinal: BS present in all quadrants.  Abdomen is soft and flat.  No pain with palpation. No hepatosplenomegaly or masses are palpated.  Skin: No concerning lesions  Neurological:  CN 2-12 grossly intact. Gait is normal.  No issues with balance. Sensation intact in hands and feet.     Musculoskeletal: Good strength and ROM in all extremities.  Strong dorsiflexion at ankles and great toes (5/5) bilaterally without any pain at the Achilles.     Labs:   Results for orders placed or performed in visit on 03/23/21   CBC with platelets differential     Status: Abnormal   Result Value Ref Range    WBC 2.2 (L) 4.0 - 11.0 10e9/L    RBC Count 3.18 (L) 4.4 - 5.9 10e12/L    Hemoglobin 11.4 (L) 13.3 - 17.7 g/dL    Hematocrit 32.8 (L) 40.0 - 53.0 %     (H) 78 - 100 fl    MCH 35.8 (H) 26.5 - 33.0 pg    MCHC 34.8 31.5 - 36.5 g/dL    RDW 14.4 10.0 - 15.0 %    Platelet Count 170 150 - 450 10e9/L    Diff Method Automated Method     % Neutrophils 68.3 %    % Lymphocytes 17.0 %    % Monocytes 8.5 %    % Eosinophils 4.0 %    % Basophils 0.9 %    % Immature Granulocytes 1.3 %    Nucleated RBCs 0 0 /100    Absolute Neutrophil 1.5 (L) 1.6 - 8.3 10e9/L    Absolute Lymphocytes 0.4 (L) 0.8 - 5.3 10e9/L    Absolute Monocytes 0.2 0.0 - 1.3 10e9/L    Absolute Eosinophils 0.1 0.0 - 0.7 10e9/L    Absolute Basophils 0.0 0.0 -  0.2 10e9/L    Abs Immature Granulocytes 0.0 0 - 0.4 10e9/L    Absolute Nucleated RBC 0.0    Comprehensive metabolic panel     Status: Abnormal   Result Value Ref Range    Sodium 141 133 - 144 mmol/L    Potassium 4.0 3.4 - 5.3 mmol/L    Chloride 110 (H) 94 - 109 mmol/L    Carbon Dioxide 30 20 - 32 mmol/L    Anion Gap <1 (L) 3 - 14 mmol/L    Glucose 92 70 - 99 mg/dL    Urea Nitrogen 18 7 - 30 mg/dL    Creatinine 0.75 0.66 - 1.25 mg/dL    GFR Estimate >90 >60 mL/min/[1.73_m2]    GFR Estimate If Black >90 >60 mL/min/[1.73_m2]    Calcium 8.6 8.5 - 10.1 mg/dL    Bilirubin Total 0.6 0.2 - 1.3 mg/dL    Albumin 3.9 3.4 - 5.0 g/dL    Protein Total 6.3 (L) 6.8 - 8.8 g/dL    Alkaline Phosphatase 58 40 - 150 U/L    ALT 22 0 - 70 U/L    AST 9 0 - 45 U/L     The following tests were ordered and interpreted by me today:  -- CBC with differential  -- CMP    A Lumbar Puncture was performed in the Pediatric Sedation Suite. Informed consent was obtained prior to the procedure. Lazaro Lund was identified by facial recognition and ID arm band. A time-out was performed. Lazaro Lund was then placed in the left lateral decubitus position and the lumbosacral area was sterily prepped using Chloraprep followed by drape placement. Anatomic landmarks were identified by palpation. Then, a 22 gauge, 3.5 inch Slava spinal needle was easily inserted into the L3/L4 interspace. On the first attempt approximately 3 mL of clear and colorless cerebrospinal fluid was obtained to be sent to the lab for cell count analysis and cytospin. Following that, 15mg of intrathecal methotrexate in 6 mL of preservative-free normal saline was infused without resistance. The needle was removed and a Band-Aid applied. Lazaro Lund tolerated this procedure very well.      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  He is being treated per [facility] protocol [id number]. He's had a CRu  following Induction with a CR at the end of Consolidation. His course was complicated by extensive bilateral DVT and severe necrotizing pancreatitis requiring cessation of PEG-asparaginase from his treatment.  He comes to clinic today for Day 1 of Cycle 5 of Maintenance therapy.    Overall, he has been doing well. He is currently on 43% full dose of 6MP with allopurinol for correction of skewed 6-MP metabolism and 100% methotrexate. ANC is in target range.  He hasn't gotten the COVID vaccine yet.    Plan:   1) Labs reviewed with Lazaro. Continue with currently prescribed 43% 6MP (50 mg 3 days/week and 75 mg 4 days/week) and 100% Methotrexate (16 tabs). Proceed with chemotherapy and LP.  2) Start prednisone burst.   3) Protonix during steroid bursts.   4) Most recent Vit D level was normal however Lazaro should continue on Vit D 1000IU daily  5) Bactrim for PCP prophylaxis.   6) Continue to check CMP and TGNs monthly moving forward.  7) Lazaro will need COVID testing prior to LPs moving forward.  He prefers to come the day of, have it done in sedation and then come to clinic for chemo while he is waiting for results.   8) Discussed COVID vaccine in detail.  Lazaro is willing to get vaccinated.  Discussed different vaccines and that he can schedule it through [employer].  I would like him to avoid vaccination during his prednisone burst or for the week after in order to maximize his immune response.   9) RTC in 4 weeks for D29 of Cycle 5 including LP with IT chemo (this is a make up LP that was missed earlier in therapy).    Félix Van, CNP    Total time spent on the following services on the date of the encounter:  -- Ordering medications, test, procedures, chemotherapy  -- Interpretation of labs and imaging    -- Performing a medically appropriate examination  -- Counseling and educating the patient/family/caregiver  -- Documenting clinical information in the electronic  -- Communicating results to the  patient/family/caregiver  -- Care coordination  -- Total time spent: 50 minutes

## 2021-03-23 NOTE — PROGRESS NOTES
Infusion Nursing Note    Lazaro Lund Presents to [facility] today for: Vincristine    Due to : T lymphoblastic lymphoma (H)    Intravenous Access/Labs: Port accessed using sterile technique; + blood return noted and labs drawn.     Coping:   Child Family Life declined    Infusion Note: Pt arrived to clinic and denies any recent fever/infections; no new issues/concerns. Pt. Seen and assessed by Luana AYALA NP.  Vincristine given by gravity as ordered; + blood return noted pre/mid/post infusion. Port heparin locked at end of infusion. Stable pt left clinic to go to Peds Sedation for procedure.

## 2021-03-25 LAB
6-TGN ENTSUB RBC: 977 PMOL/8X10(8)RBC (ref 235–450)
6MMP ENTSUB RBC: <488 PMOL/8X10(8)RBC

## 2021-04-19 ENCOUNTER — ANESTHESIA EVENT (OUTPATIENT)
Dept: PEDIATRICS | Facility: CLINIC | Age: 21
End: 2021-04-19
Payer: COMMERCIAL

## 2021-04-20 ENCOUNTER — OFFICE VISIT (OUTPATIENT)
Dept: PEDIATRIC HEMATOLOGY/ONCOLOGY | Facility: CLINIC | Age: 21
End: 2021-04-20
Attending: NURSE PRACTITIONER
Payer: COMMERCIAL

## 2021-04-20 ENCOUNTER — ANESTHESIA (OUTPATIENT)
Dept: PEDIATRICS | Facility: CLINIC | Age: 21
End: 2021-04-20
Payer: COMMERCIAL

## 2021-04-20 ENCOUNTER — INFUSION THERAPY VISIT (OUTPATIENT)
Dept: INFUSION THERAPY | Facility: CLINIC | Age: 21
End: 2021-04-20
Attending: NURSE PRACTITIONER
Payer: COMMERCIAL

## 2021-04-20 ENCOUNTER — HOSPITAL ENCOUNTER (OUTPATIENT)
Facility: CLINIC | Age: 21
Discharge: HOME | End: 2021-04-20
Attending: RADIOLOGY | Admitting: RADIOLOGY
Payer: COMMERCIAL

## 2021-04-20 VITALS
OXYGEN SATURATION: 97 % | TEMPERATURE: 98.6 F | HEART RATE: 55 BPM | HEIGHT: 73 IN | RESPIRATION RATE: 16 BRPM | SYSTOLIC BLOOD PRESSURE: 100 MMHG | WEIGHT: 171.3 LBS | DIASTOLIC BLOOD PRESSURE: 49 MMHG | BODY MASS INDEX: 22.7 KG/M2

## 2021-04-20 VITALS
TEMPERATURE: 98.1 F | HEART RATE: 64 BPM | SYSTOLIC BLOOD PRESSURE: 114 MMHG | RESPIRATION RATE: 18 BRPM | DIASTOLIC BLOOD PRESSURE: 52 MMHG | OXYGEN SATURATION: 100 %

## 2021-04-20 DIAGNOSIS — C83.50 T LYMPHOBLASTIC LYMPHOMA (H): Primary | ICD-10-CM

## 2021-04-20 DIAGNOSIS — Z11.59 ENCOUNTER FOR SCREENING FOR OTHER VIRAL DISEASES: ICD-10-CM

## 2021-04-20 LAB
BASOPHILS # BLD AUTO: 0 10E9/L (ref 0–0.2)
BASOPHILS NFR BLD AUTO: 0.9 %
DIFFERENTIAL METHOD BLD: ABNORMAL
EOSINOPHIL # BLD AUTO: 0.1 10E9/L (ref 0–0.7)
EOSINOPHIL NFR BLD AUTO: 2.3 %
ERYTHROCYTE [DISTWIDTH] IN BLOOD BY AUTOMATED COUNT: 14.4 % (ref 10–15)
HCT VFR BLD AUTO: 34.4 % (ref 40–53)
HGB BLD-MCNC: 12 G/DL (ref 13.3–17.7)
IMM GRANULOCYTES # BLD: 0 10E9/L (ref 0–0.4)
IMM GRANULOCYTES NFR BLD: 0 %
LABORATORY COMMENT REPORT: NORMAL
LYMPHOCYTES # BLD AUTO: 0.4 10E9/L (ref 0.8–5.3)
LYMPHOCYTES NFR BLD AUTO: 18.9 %
MCH RBC QN AUTO: 36.4 PG (ref 26.5–33)
MCHC RBC AUTO-ENTMCNC: 34.9 G/DL (ref 31.5–36.5)
MCV RBC AUTO: 104 FL (ref 78–100)
MONOCYTES # BLD AUTO: 0.2 10E9/L (ref 0–1.3)
MONOCYTES NFR BLD AUTO: 9.7 %
NEUTROPHILS # BLD AUTO: 1.5 10E9/L (ref 1.6–8.3)
NEUTROPHILS NFR BLD AUTO: 68.2 %
NRBC # BLD AUTO: 0 10*3/UL
NRBC BLD AUTO-RTO: 0 /100
PLATELET # BLD AUTO: 180 10E9/L (ref 150–450)
RBC # BLD AUTO: 3.3 10E12/L (ref 4.4–5.9)
SARS-COV-2 RNA RESP QL NAA+PROBE: NEGATIVE
SPECIMEN SOURCE: NORMAL
WBC # BLD AUTO: 2.2 10E9/L (ref 4–11)

## 2021-04-20 PROCEDURE — 80299 QUANTITATIVE ASSAY DRUG: CPT | Performed by: NURSE PRACTITIONER

## 2021-04-20 PROCEDURE — 250N000011 HC RX IP 250 OP 636: Performed by: NURSE PRACTITIONER

## 2021-04-20 PROCEDURE — 89050 BODY FLUID CELL COUNT: CPT | Performed by: NURSE PRACTITIONER

## 2021-04-20 PROCEDURE — 370N000017 HC ANESTHESIA TECHNICAL FEE, PER MIN: Performed by: NURSE PRACTITIONER

## 2021-04-20 PROCEDURE — 999N000141 HC STATISTIC PRE-PROCEDURE NURSING ASSESSMENT: Performed by: NURSE PRACTITIONER

## 2021-04-20 PROCEDURE — 96450 CHEMOTHERAPY INTO CNS: CPT | Performed by: NURSE PRACTITIONER

## 2021-04-20 PROCEDURE — 85025 COMPLETE CBC W/AUTO DIFF WBC: CPT | Performed by: NURSE PRACTITIONER

## 2021-04-20 PROCEDURE — 250N000011 HC RX IP 250 OP 636: Performed by: NURSE ANESTHETIST, CERTIFIED REGISTERED

## 2021-04-20 PROCEDURE — 250N000009 HC RX 250: Performed by: ANESTHESIOLOGY

## 2021-04-20 PROCEDURE — 99215 OFFICE O/P EST HI 40 MIN: CPT | Performed by: NURSE PRACTITIONER

## 2021-04-20 PROCEDURE — 87635 SARS-COV-2 COVID-19 AMP PRB: CPT | Performed by: NURSE PRACTITIONER

## 2021-04-20 PROCEDURE — 999N000131 HC STATISTIC POST-PROCEDURE RECOVERY CARE: Performed by: NURSE PRACTITIONER

## 2021-04-20 PROCEDURE — 258N000003 HC RX IP 258 OP 636: Performed by: NURSE ANESTHETIST, CERTIFIED REGISTERED

## 2021-04-20 PROCEDURE — 258N000003 HC RX IP 258 OP 636: Performed by: NURSE PRACTITIONER

## 2021-04-20 PROCEDURE — G0463 HOSPITAL OUTPT CLINIC VISIT: HCPCS | Mod: 25 | Performed by: NURSE PRACTITIONER

## 2021-04-20 PROCEDURE — 250N000011 HC RX IP 250 OP 636: Mod: JW | Performed by: NURSE PRACTITIONER

## 2021-04-20 PROCEDURE — 96409 CHEMO IV PUSH SNGL DRUG: CPT

## 2021-04-20 RX ORDER — SODIUM CHLORIDE, SODIUM LACTATE, POTASSIUM CHLORIDE, CALCIUM CHLORIDE 600; 310; 30; 20 MG/100ML; MG/100ML; MG/100ML; MG/100ML
INJECTION, SOLUTION INTRAVENOUS CONTINUOUS PRN
Status: DISCONTINUED | OUTPATIENT
Start: 2021-04-20 | End: 2021-04-20

## 2021-04-20 RX ORDER — FENTANYL CITRATE 50 UG/ML
INJECTION, SOLUTION INTRAMUSCULAR; INTRAVENOUS PRN
Status: DISCONTINUED | OUTPATIENT
Start: 2021-04-20 | End: 2021-04-20

## 2021-04-20 RX ORDER — ONDANSETRON 2 MG/ML
INJECTION INTRAMUSCULAR; INTRAVENOUS PRN
Status: DISCONTINUED | OUTPATIENT
Start: 2021-04-20 | End: 2021-04-20

## 2021-04-20 RX ORDER — LIDOCAINE 40 MG/G
CREAM TOPICAL
Status: COMPLETED | OUTPATIENT
Start: 2021-04-20 | End: 2021-04-20

## 2021-04-20 RX ORDER — HEPARIN SODIUM,PORCINE 10 UNIT/ML
VIAL (ML) INTRAVENOUS
Status: DISCONTINUED
Start: 2021-04-20 | End: 2021-04-20 | Stop reason: HOSPADM

## 2021-04-20 RX ORDER — HEPARIN SODIUM,PORCINE 10 UNIT/ML
3 VIAL (ML) INTRAVENOUS ONCE
Status: COMPLETED | OUTPATIENT
Start: 2021-04-20 | End: 2021-04-20

## 2021-04-20 RX ADMIN — MIDAZOLAM 1 MG: 1 INJECTION INTRAMUSCULAR; INTRAVENOUS at 11:29

## 2021-04-20 RX ADMIN — MIDAZOLAM 1 MG: 1 INJECTION INTRAMUSCULAR; INTRAVENOUS at 11:24

## 2021-04-20 RX ADMIN — ONDANSETRON 4 MG: 2 INJECTION INTRAMUSCULAR; INTRAVENOUS at 11:17

## 2021-04-20 RX ADMIN — METHOTREXATE: 25 INJECTION INTRA-ARTERIAL; INTRAMUSCULAR; INTRATHECAL; INTRAVENOUS at 11:16

## 2021-04-20 RX ADMIN — VINCRISTINE SULFATE 2 MG: 1 INJECTION, SOLUTION INTRAVENOUS at 10:45

## 2021-04-20 RX ADMIN — MIDAZOLAM 2 MG: 1 INJECTION INTRAMUSCULAR; INTRAVENOUS at 11:14

## 2021-04-20 RX ADMIN — FENTANYL CITRATE 50 MCG: 50 INJECTION, SOLUTION INTRAMUSCULAR; INTRAVENOUS at 11:17

## 2021-04-20 RX ADMIN — SODIUM CHLORIDE, POTASSIUM CHLORIDE, SODIUM LACTATE AND CALCIUM CHLORIDE: 600; 310; 30; 20 INJECTION, SOLUTION INTRAVENOUS at 11:17

## 2021-04-20 RX ADMIN — HEPARIN, PORCINE (PF) 10 UNIT/ML INTRAVENOUS SYRINGE 3 ML: at 10:52

## 2021-04-20 ASSESSMENT — MIFFLIN-ST. JEOR: SCORE: 1825.13

## 2021-04-20 NOTE — PROGRESS NOTES
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 Lazaro presented with acute onset cough and was found to have an anterior mediastinal mass and malignant left-sided pleural effusion.  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  He was admitted to [facility] oncology service 8/30 and started on treatment per [id number].  He had a pleural effusion that required a chest tube and a pericardial effusion that was not drained. His Induction was complicated by cardiac compression secondary to his mass leading to tamponade, this improved through out his hospital stay.  He also had dysphagia that improved with treatment of his mass, swallow study was normal. His course was complicated by extensive bilateral UE DVTs, and he was successfully treated with anticoagulation. His consolidation course was complicated by the development of severe asparaginase induced necrotizing pancreatitis. He became quite ill with SIRS and associated hypotension, he required pressor support in the ICU. As a result, asparaginase was eliminated from his treatment plan. He was in CR status at end of consolidation. During maintenance, he developed hepatotoxicity so allopurinol and dose reduced 6MP were started for correction of skewed 6-MP metabolism.  Lazaro comes to clinic today for Day 29 of his 5th Maintenance cycle, with a make up lumbar puncture.     HPI:   Since his last visit Lazaro has been doing very well. His energy level has been good. He is eating and drinking well. No GI upset. Having daily soft stools. Denies pain. No skin concerns. No paresthesias or gait changes. Denies fever or respiratory symptoms.    Lazaro does not some intermittent mucositis and/or canker sores. He notes that they occur for approximately one week at a time and then go away without intervention. He typically gets them on his  tongue, lower lip, and left cheek. This has not impacted oral intake.    He hasn't gotten the COVID vaccine yet. Continues to be interested in this.     Review of systems:  The 10 point Review of Systems is negative other than noted in the HPI or here.      PMH:   Past Medical History:   Diagnosis Date     Acute necrotizing pancreatitis 11/07/2019    attributed to asparaginase     Acute pancreatitis due to PEGaspariginase therapy  11/15/2019     DVT of upper extremity (deep vein thrombosis) (H) 09/26/2019    Bilateral      Edema of upper extremity 10/17/2019     Folliculitis 10/17/2019     Migraine 2006    have resolved     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     -- Spinal HA following LP  -- TPMT shows Intermediate activity with normal TPMT/NUDT15 genotype  -- Factor V leiden and prothrombin gene mutation negative  -- Necrotizing pancreatitis secondary to asparaginase  -- former smoker, quit with the use of nicotine patches. Former daily marajuana smoker, now only uses occasionally  PFMH:   Family History   Problem Relation Age of Onset     No Known Problems Mother      No Known Problems Father      Asthma Brother      Thyroid Cancer Paternal Grandmother      Melanoma Paternal Aunt        Social History:   Social History     Social History Narrative    Lazaro previously lived at home with his dad and step mom in [place] but is now staying with his grandma in [place]. Biological mother is involved in his life. Has 4 step-siblings and 1 biological sibling. 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 Has completed high school. 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  He has been enjoying fishing and video games.        Current Medications:  Current Outpatient Medications on File Prior to Visit   Medication Sig Dispense Refill     allopurinol (ZYLOPRIM) 100 MG tablet Take 1 tablet  (100 mg) by mouth daily 30 tablet 11     diphenhydrAMINE (BENADRYL) 25 MG capsule Take 1-2 capsules (25-50 mg) by mouth every 6 hours as needed (Breakthrough Nausea and Vomiting ) 30 capsule 1     lidocaine-prilocaine (EMLA) 2.5-2.5 % external cream Apply topically as needed for other (prior to port access) 30 g 6     mercaptopurine (PURINETHOL) 50 MG tablet Take 50mg (1 tablet) three days/week and 75mg (1.5 tablets) four days/week. 40 tablet 2     methotrexate 2.5 MG tablet Take 16 tablets (40 mg) by mouth once a week Do not take on Days of LP. 64 tablet 2     ondansetron (ZOFRAN-ODT) 8 MG ODT tab Take 1 tablet (8 mg) by mouth every 8 hours as needed for nausea 20 tablet 6     pantoprazole (PROTONIX) 40 MG EC tablet Take 1 tablet (40 mg) by mouth every morning (before breakfast) During weeks of taking prednisone. 30 tablet 2     polyethylene glycol (MIRALAX/GLYCOLAX) packet Take 17 g by mouth daily 30 packet 0     predniSONE (DELTASONE) 10 MG tablet Take 40mg (4 tabs) twice dialy for 5 days every 4 weeks. 40 tablet 2     sennosides (SENOKOT) 8.6 MG tablet Take 2 tablets by mouth 2 times daily as needed for constipation 60 each 1     sulfamethoxazole-trimethoprim (BACTRIM DS) 800-160 MG tablet Take 1 tablet by mouth Every Mon, Tues two times daily 16 tablet 11     Vitamin D, Cholecalciferol, 25 MCG (1000 UT) TABS Take 1 tablet (1,000 Units) by mouth daily 30 tablet 3     Reviewed medications with Lazaro. Confirmed oral chemo doses, has not missed any doses. He is not taking Vitamin D currently.  Received [id number] influenza vaccine on 12/22/20     Physical Exam:   Temp:  [98.1  F (36.7  C)-98.6  F (37  C)] 98.6  F (37  C)  Pulse:  [54-64] 55  Resp:  [16-18] 16  BP: ()/(49-58) 100/49  SpO2:  [96 %-100 %] 97 %  Wt Readings from Last 4 Encounters:   04/20/21 77.7 kg (171 lb 4.8 oz)   03/23/21 76.3 kg (168 lb 3.4 oz)   02/23/21 76.2 kg (167 lb 15.9 oz)   02/23/21 76.5 kg (168 lb 10.4 oz)     Ht Readings from Last  "2 Encounters:   04/20/21 1.845 m (6' 0.64\")   03/23/21 1.843 m (6' 0.56\")     General: Lazaro is alert, interactive and appropriate, well-appearing, in no distress  HEENT: Skull is atrauamatic and normocephalic.  Full head of hair. PERRLA, sclera are non icteric and not injected, EOM are intact. Nares are patent without drainage. Oropharynx clear, no plaques noted. Buccal mucosa and tongue clear with no plaques, white patches, or other lesions noted on exam. MMM.  Neck:  Supple without lymphadenopathy.   Lymph: There is no cervical, supraclavicular, axillary, lymphadenopathy palpated.  Cardiovascular:  HR is regular, S1, S2 no murmur.  Capillary refill is < 2 seconds.   Respiratory: No cough noted. Breathing effortlessly. Lungs are clear to auscultation throughout.  No crackles or wheezes.  Gastrointestinal: BS present in all quadrants.  Abdomen is soft and flat.  No pain with palpation. No hepatosplenomegaly or masses are palpated.  Skin: No concerning lesions  Neurological:  CN 2-12 grossly intact. Gait is normal.  No issues with balance. Sensation intact in hands and feet.     Musculoskeletal: Good strength and ROM in all extremities.  Strong dorsiflexion at ankles and great toes (5/5) bilaterally without any pain at the Achilles.     Labs:   Results for orders placed or performed during the hospital encounter of 04/20/21   Asymptomatic SARS-CoV-2 COVID-19 Virus (Coronavirus) by PCR     Status: None    Specimen: Nasopharyngeal   Result Value Ref Range    SARS-CoV-2 Virus Specimen Source Nasopharyngeal     SARS-CoV-2 PCR Result NEGATIVE     SARS-CoV-2 PCR Comment (Note)    Results for orders placed or performed in visit on 04/20/21   CBC with platelets differential     Status: Abnormal   Result Value Ref Range    WBC 2.2 (L) 4.0 - 11.0 10e9/L    RBC Count 3.30 (L) 4.4 - 5.9 10e12/L    Hemoglobin 12.0 (L) 13.3 - 17.7 g/dL    Hematocrit 34.4 (L) 40.0 - 53.0 %     (H) 78 - 100 fl    MCH 36.4 (H) 26.5 - 33.0 pg "    MCHC 34.9 31.5 - 36.5 g/dL    RDW 14.4 10.0 - 15.0 %    Platelet Count 180 150 - 450 10e9/L    Diff Method Automated Method     % Neutrophils 68.2 %    % Lymphocytes 18.9 %    % Monocytes 9.7 %    % Eosinophils 2.3 %    % Basophils 0.9 %    % Immature Granulocytes 0.0 %    Nucleated RBCs 0 0 /100    Absolute Neutrophil 1.5 (L) 1.6 - 8.3 10e9/L    Absolute Lymphocytes 0.4 (L) 0.8 - 5.3 10e9/L    Absolute Monocytes 0.2 0.0 - 1.3 10e9/L    Absolute Eosinophils 0.1 0.0 - 0.7 10e9/L    Absolute Basophils 0.0 0.0 - 0.2 10e9/L    Abs Immature Granulocytes 0.0 0 - 0.4 10e9/L    Absolute Nucleated RBC 0.0      The following tests were ordered and interpreted by me today:  -- CBC with differential    Assessment:  Lazaro Lund is a 20 year old young man with T cell lymphoblastic lymphoma (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  He is being treated per [facility] protocol [id number]. He's had a CRu following Induction with a CR at the end of Consolidation. His course was complicated by extensive bilateral DVT and severe necrotizing pancreatitis requiring cessation of PEG-asparaginase from his treatment.  He comes to clinic today for Day 29 of Cycle 5 of Maintenance therapy, including a make up lumbar puncture.    Overall, he has been doing well. He is currently on 43% full dose of 6MP with allopurinol for correction of skewed 6-MP metabolism and 100% methotrexate. ANC is in target range - 1.5.  He hasn't gotten the COVID vaccine yet. Mild intermittent episodes of mucositis, not impacting oral hydration or caloric intake. No other concerns today.    Plan:   1) Labs reviewed with Lazaro. Continue with currently prescribed 43% 6MP (50 mg 3 days/week and 75 mg 4 days/week) and 100% Methotrexate (16 tabs). Proceed with chemotherapy and LP.  2) Start prednisone burst.   3) Protonix during steroid bursts.   4) Most recent Vit D level was normal however Lazaro should continue on Vit D 1000IU daily.  5) Bactrim for PCP prophylaxis.   6) Continue to  check CMP and TGNs monthly moving forward. Pending from today.  7) Lazaro will need COVID testing prior to LPs moving forward.  He prefers to come the day of, have it done in sedation and then come to clinic for chemo while he is waiting for results.   8) Discussed COVID vaccine in detail.  Lazaro is willing to get vaccinated.  Discussed different vaccines and that he can schedule it through [facility].  I would like him to avoid vaccination during his prednisone burst or for the week after in order to maximize his immune response.   9) RTC in 4 weeks for D57 of Cycle 5    Marie Damon CNP    Total time spent on the following services on the date of the encounter:  -- Ordering medications, test, procedures, chemotherapy  -- Interpretation of labs and imaging    -- Performing a medically appropriate examination  -- Counseling and educating the patient/family/caregiver  -- Documenting clinical information in the electronic  -- Communicating results to the patient/family/caregiver  -- Care coordination  -- Total time spent: 50 minutes

## 2021-04-20 NOTE — DISCHARGE INSTRUCTIONS
[facility]   [phone]    Care post Lumbar Puncture     Do not remove bandage/dressing for 24 hours -- after this time they can be removed    No bath, shower or soaking of the dressing for 24 hours    Activity as tolerated by the patient    Diet as able to tolerated    May use Tylenol as needed for pain control -- DO NOT use Ibuprofen    Can apply icepack to the site for discomfort -- no more than 10 minutes at a time    If bleeding presents apply pressure for 5 minutes    Call [phone] ask for Peds BMT/Hem/Onc fellow on call if complications arise including:    persistent bleeding    fever greater than 100.5    Pain    Lumbar punctures can cause headache. If the pain is not controlled with Tylenol (acetaminophen) please call the Peds BMT/Hem/Onc fellow on call      Activity and Diet:    You were given medicine for sedation during the procedure.  You may be dizzy or sleepy for the rest of the day.       Do not drive any motorized vehicles or operate any potentially hazardous equipment until tomorrow.       Do not make important decisions or sign documents today.       You may return to your regular diet today if clear liquids do not upset your stomach.       You may restart your medications on discharge unless your doctor has instructed you differently.       Do not participate in contact sports, gymnastic or other complex movements requiring coordination to prevent injury until tomorrow.       You may return to work tomorrow.

## 2021-04-20 NOTE — PROGRESS NOTES
Infusion Nursing Note    Lazaro Lund Presents to [place] [place] [facility] today for: Vincristine    Due to :    T lymphoblastic lymphoma (H)  Encounter for screening for other viral diseases    Intravenous Access/Labs: Port accessed using sterile technique without issue; + blood return noted and labs drawn as ordered.     Infusion Note: Pt arrived to clinic and denies any recent fever/infections; no new issues/concerns. Pt. Seen and assessed by Marie Damon NP.  Vincristine given by gravity; + blood return noted pre/mid/post infusion. Port heparin locked at end of infusion. Stable pt left clinic to go to Peds Sedation for procedure.

## 2021-04-20 NOTE — ANESTHESIA CARE TRANSFER NOTE
Patient: Lazaro Lund    Procedure(s):  Lumbar puncture with intrathecal Chemotherapy (not CD)    Diagnosis: Lymphoma (H) [C85.90]  Diagnosis Additional Information: No value filed.    Anesthesia Type:   MAC     Note:    Oropharynx: oropharynx clear of all foreign objects and spontaneously breathing  Level of Consciousness: drowsy  Oxygen Supplementation: room air    Independent Airway: airway patency satisfactory and stable  Dentition: dentition unchanged      Patient transferred to:  Recovery    Handoff Report: Identifed the Patient, Identified the Reponsible Provider, Reviewed the pertinent medical history, Discussed the surgical course, Reviewed Intra-OP anesthesia mangement and issues during anesthesia, Set expectations for post-procedure period and Allowed opportunity for questions and acknowledgement of understanding      Vitals: (Last set prior to Anesthesia Care Transfer)  CRNA VITALS  4/20/2021 1107 - 4/20/2021 1138      4/20/2021             NIBP:  (!) 88/56    Pulse:  62    NIBP Mean:  62    Temp:  36.7  C (98.1  F)    Resp Rate (observed):  12    EKG:  Sinus bradycardia        Electronically Signed By: YOHAN JUAREZ CRNA  April 20, 2021  11:38 AM

## 2021-04-20 NOTE — ANESTHESIA PREPROCEDURE EVALUATION
Anesthesia Pre-Procedure Evaluation    Patient: Lazaro Lund   MRN:     [id number] Gender:   male   Age:    20 year old :      2000        Preoperative Diagnosis: Lymphoma (H) [C85.90]   Procedure(s):  Lumbar puncture with intrathecal Chemotherapy (not CD)     LABS:  CBC:   Lab Results   Component Value Date    WBC 2.2 (L) 2021    WBC 2.2 (L) 2021    HGB 12.0 (L) 2021    HGB 11.4 (L) 2021    HCT 34.4 (L) 2021    HCT 32.8 (L) 2021     2021     2021     BMP:   Lab Results   Component Value Date     2021     2021    POTASSIUM 4.0 2021    POTASSIUM 4.0 2021    CHLORIDE 110 (H) 2021    CHLORIDE 109 2021    CO2 30 2021    CO2 24 2021    BUN 18 2021    BUN 16 2021    CR 0.75 2021    CR 0.62 (L) 2021    GLC 92 2021    GLC 93 2021     COAGS:   Lab Results   Component Value Date    PTT 45 (H) 2019    INR 1.09 2019    FIBR 293 2019     POC:   Lab Results   Component Value Date    BGM 87 2019     OTHER:   Lab Results   Component Value Date    LACT 0.4 (L) 2019    MICHAEL 8.6 2021    PHOS 4.2 2019    MAG 2.1 2019    ALBUMIN 3.9 2021    PROTTOTAL 6.3 (L) 2021    ALT 22 2021    AST 9 2021    ALKPHOS 58 2021    BILITOTAL 0.6 2021    LIPASE 21 (L) 2020    AMYLASE 24 (L) 2020    .0 (H) 2019        Preop Vitals    BP Readings from Last 3 Encounters:   21 114/52   21 100/50   21 108/61    Pulse Readings from Last 3 Encounters:   21 64   21 61   21 72      Resp Readings from Last 3 Encounters:   21 18   21 16   21 24    SpO2 Readings from Last 3 Encounters:   21 100%   21 97%   21 100%      Temp Readings from Last 1 Encounters:   21 36.7  C (98.1  F) (Oral)    Ht Readings from Last 1  "Encounters:   04/20/21 1.845 m (6' 0.64\")      Wt Readings from Last 1 Encounters:   04/20/21 77.7 kg (171 lb 4.8 oz)    Estimated body mass index is 22.83 kg/m  as calculated from the following:    Height as of this encounter: 1.845 m (6' 0.64\").    Weight as of this encounter: 77.7 kg (171 lb 4.8 oz).     LDA:  Port A Cath Single 10/24/19 Right Chest wall (Active)   Site Assessment WDL 03/23/21 1100   Dressing Intervention Transparent 03/23/21 0845   Dressing change due 04/02/20 03/26/20 0845   Line Status Heparin locked 03/23/21 1100   Access Date 03/23/21 03/23/21 0845   Access Attempts 1 03/23/21 0845   Gauge Power noncoring 90 degree bend;20 gauge;1 inch 03/23/21 0845   Needle Change Due 04/02/20 03/26/20 0845   Line Necessity Yes, meets criteria 03/26/20 0845   De-Access Date 03/23/21 03/23/21 1100   Date to be Reflushed 04/23/21 03/23/21 1100   Extravasation? 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      Past Surgical History:   Procedure Laterality Date     BONE MARROW BIOPSY, BONE SPECIMEN, NEEDLE/TROCAR Left 9/1/2019    Procedure: BIOPSY, BONE MARROW;  Surgeon: Heather Lopez MD;  [facility]     INSERT PICC LINE N/A 8/31/2019    Procedure: INSERTION, PICC;  Surgeon: Michell Keith MD;  [facility]: [facility]     INSERT PORT VASCULAR ACCESS N/A 10/24/2019    Procedure: INSERTION, VASCULAR ACCESS PORT;  Surgeon: Silviano Martins MD;  [facility]: [facility]      IR CHEST PORT PLACEMENT > 5 YRS OF AGE  10/24/2019     IR CHEST TUBE PLACEMENT NON-TUNNELLED LEFT  8/31/2019     IR PICC PLACEMENT " > 5 YRS OF AGE  8/31/2019     IR PORT CHECK RIGHT  11/12/2019     SPINAL PUNCTURE,LUMBAR, INTRATHECAL CHEMO DELIVERY N/A 8/31/2019    Procedure: LUMBAR PUNCTURE, WITH INTRATHECAL CHEMOTHERAPY ADMINISTRATION;  Surgeon: Heather Lopez MD;  [facility]     SPINAL PUNCTURE,LUMBAR, INTRATHECAL CHEMO DELIVERY N/A 9/9/2019    Procedure: Lumbar Puncture With Intrathecal Chemo;  Surgeon: Alexi Hayes MD;  [facility]     SPINAL PUNCTURE,LUMBAR, INTRATHECAL CHEMO DELIVERY N/A 10/10/2019    Procedure: Lumbar puncture with IT Chemo (CD);  Surgeon: Reynaldo Campuzano MD;  [facility]      SPINAL PUNCTURE,LUMBAR, INTRATHECAL CHEMO DELIVERY N/A 10/17/2019    Procedure: Lumbar puncture with IT Chemo (not CD);  Surgeon: Reynaldo Campuzano MD;  [facility]      SPINAL PUNCTURE,LUMBAR, INTRATHECAL CHEMO DELIVERY N/A 10/24/2019    Procedure: LUMBAR PUNCTURE, WITH INTRATHECAL CHEMOTHERAPY ADMINISTRATION;  Surgeon: Félix Van APRN CNP;  [facility]      SPINAL PUNCTURE,LUMBAR, INTRATHECAL CHEMO DELIVERY N/A 10/31/2019    Procedure: Lumbar puncture with IT Chemo (not CD);  Surgeon: Reynaldo Campuzano MD;  [facility]      SPINAL PUNCTURE,LUMBAR, INTRATHECAL CHEMO DELIVERY N/A 12/19/2019    Procedure: Lumbar puncture with IT Chem (CD);  Surgeon: Félix Van APRN CNP;  [facility]      SPINAL PUNCTURE,LUMBAR, INTRATHECAL CHEMO DELIVERY N/A 12/23/2019    Procedure: Lumbar puncture with IT Chem (CD);  Surgeon: Heather Lopez MD;  [facility]      SPINAL PUNCTURE,LUMBAR, INTRATHECAL CHEMO DELIVERY N/A 1/23/2020    Procedure: Lumbar puncture with IT Chem (CD);  Surgeon: Reynaldo Campuzano MD;  [facility]      SPINAL PUNCTURE,LUMBAR, INTRATHECAL CHEMO DELIVERY N/A 2/20/2020    Procedure: Lumbar puncture with intrathecal Chemotherapy (CD);  Surgeon: Reynaldo Campuzano  MD Ann-Marie;  [facility]      SPINAL PUNCTURE,LUMBAR, INTRATHECAL CHEMO DELIVERY N/A 3/19/2020    Procedure: Lumbar puncture with intrathecal Chemotherapy (CD);  Surgeon: Reynaldo Campuzano MD;  [facility]      SPINAL PUNCTURE,LUMBAR, INTRATHECAL CHEMO DELIVERY N/A 3/26/2020    Procedure: Lumbar puncture with intrathecal Chemotherapy (not CD);  Surgeon: Todd Mercado MD;  [facility]      SPINAL PUNCTURE,LUMBAR, INTRATHECAL CHEMO DELIVERY N/A 4/23/2020    Procedure: Lumbar puncture with intrathecal Chemotherapy (CD);  Surgeon: Marleny Brewer MD;  [facility]      SPINAL PUNCTURE,LUMBAR, INTRATHECAL CHEMO DELIVERY N/A 5/14/2020    Procedure: Lumbar puncture with intrathecal Chemotherapy (not CD);  Surgeon: Todd Mercado MD;  [facility]      SPINAL PUNCTURE,LUMBAR, INTRATHECAL CHEMO DELIVERY N/A 7/9/2020    Procedure: Lumbar puncture with intrathecal Chemotherapy (CD);  Surgeon: Todd Mercado MD;  [facility]      SPINAL PUNCTURE,LUMBAR, INTRATHECAL CHEMO DELIVERY N/A 8/6/2020    Procedure: Lumbar puncture with intrathecal Chemotherapy (not CD);  Surgeon: Todd Mercado MD;  [facility]      SPINAL PUNCTURE,LUMBAR, INTRATHECAL CHEMO DELIVERY N/A 10/6/2020    Procedure: Lumbar puncture with intrathecal Chemotherapy (not CD);  Surgeon: Félix Van, YOHAN CNP;  [facility]      SPINAL PUNCTURE,LUMBAR, INTRATHECAL CHEMO DELIVERY N/A 11/3/2020    Procedure: Lumbar puncture with intrathecal Chemotherapy (not CD);  Surgeon: Reynaldo Campuzano MD;  [facility]      SPINAL PUNCTURE,LUMBAR, INTRATHECAL CHEMO DELIVERY N/A 12/29/2020    Procedure: Lumbar puncture with intrathecal Chemotherapy (CD);  Surgeon: Reynaldo Campuzano MD;  [facility]      SPINAL PUNCTURE,LUMBAR, INTRATHECAL CHEMO DELIVERY N/A 2/23/2021     Procedure: Lumbar puncture with intrathecal Chemotherapy (not CD);  Surgeon: Félix Van APRN CNP;  [facility]      SPINAL PUNCTURE,LUMBAR, INTRATHECAL CHEMO DELIVERY N/A 3/23/2021    Procedure: Lumbar puncture with intrathecal Chemotherapy (not CD);  Surgeon: Marie Damon, NP;  [facility]      THORACENTESIS N/A 2019    Procedure: Thoracentesis;  Surgeon: Michell Keith MD;  [facility]      Allergies   Allergen Reactions     Asparaginase Derivatives Other (See Comments)     Severe pancreatitis     No Known Drug Allergies         Anesthesia Evaluation    ROS/Med Hx    No history of anesthetic complications    Cardiovascular Findings - negative ROS    Neuro Findings - negative ROS    Pulmonary Findings - negative ROS    HENT Findings - negative HENT ROS    Skin Findings - negative skin ROS     Findings   (-) prematurity      GI/Hepatic/Renal Findings   (-) liver disease and renal disease  Comments:   - H/o necrotizing pancreatitis d/t asparaginase    Endocrine/Metabolic Findings - negative ROS      Genetic/Syndrome Findings - negative genetics/syndromes ROS    Hematology/Oncology Findings   (+) cancer (Lymphoma)    Additional Notes  - H/o DVT in UE  - Marijuana use          PHYSICAL EXAM:   Mental Status/Neuro: A/A/O   Airway: Facies: Feasible  Mallampati: I  Mouth/Opening: Full  TM distance: > 6 cm  Neck ROM: Full   Respiratory: Auscultation: CTAB     Resp. Rate: Normal     Resp. Effort: Normal      CV: Rhythm: Regular  Rate: Age appropriate  Heart: Normal Sounds  Edema: None   Comments:      Dental: Normal Dentition                Anesthesia Plan    ASA Status:  3   NPO Status:  NPO Appropriate    Anesthesia Type: MAC.   Induction: N/a.   Maintenance: N/A.        Consents    Anesthesia Plan(s) and associated risks, benefits, and realistic alternatives discussed. Questions answered and patient/representative(s) expressed understanding.     - Discussed  with:  Patient      - Extended Intubation/Ventilatory Support Discussed: No.      - Patient is DNR/DNI Status: No    Use of blood products discussed: No .     Postoperative Care    Post procedure pain management: none anticipated.   PONV prophylaxis: Ondansetron (or other 5HT-3)     Comments:    Discussed common and potentially harmful risks for Native Airway, MAC (GA as backup).   These risks include, but were not limited to: Conversion to secured airway, Sore throat, Airway injury, Dental injury, Aspiration, Respiratory issues (Bronchospasm, Laryngospasm, Desaturation), Hemodynamic issues (Arrhythmia, Hypotension, Ischemia), Potential long term consequences of respiratory and hemodynamic issues, PONV, Emergence delirium/agitation  Risks of invasive procedures were not discussed: N/A    All questions were answered.         Kyle Palomino MD

## 2021-04-20 NOTE — PROGRESS NOTES
Procedure Note  A Lumbar Puncture was performed in the Pediatric Sedation Suite. Informed consent was obtained prior to the procedure. Lazaro Lund was identified by facial recognition and ID arm band. A time-out was performed. Lazaro Lund was then placed in the left lateral decubitus position and the lumbosacral area was sterily prepped using Chloraprep followed by drape placement. Anatomic landmarks were identified by palpation. Then, a 22 gauge, 3.5 inch Slava spinal needle was unsuccessfully attempted to be inserted into the L3/L4 interspace. Lazaro was repositioned. On the second attempt, a 22 gauge, 3.5 inch Slava spinal needle was easily into the L3/L4 interspace. Aproximately 3 mL of clear and colorless cerebrospinal fluid was obtained to be sent to the lab for cell count analysis and cytospin. Following that, 15mg of intrathecal methotrexate in 6 mL of preservative-free normal saline was infused without resistance. The needle was removed and a Band-Aid applied. Lazaro Lund tolerated this procedure very well.    Marie Damon CNP performed the procedure. Luana Van CNP was present and available for assistance throughout the entire procedure.

## 2021-04-20 NOTE — ANESTHESIA POSTPROCEDURE EVALUATION
Patient: Lazaro Lund    Procedure(s):  Lumbar puncture with intrathecal Chemotherapy (not CD)    Diagnosis:Lymphoma (H) [C85.90]  Diagnosis Additional Information: No value filed.    Anesthesia Type:  MAC    Note:  Disposition: Outpatient   Postop Pain Control: Uneventful            Sign Out: Well controlled pain   PONV: No   Neuro/Psych: Uneventful            Sign Out: Acceptable/Baseline neuro status   Airway/Respiratory: Uneventful            Sign Out: Acceptable/Baseline resp. status   CV/Hemodynamics: Uneventful            Sign Out: Acceptable CV status   Other NRE: NONE   DID A NON-ROUTINE EVENT OCCUR? No    Event details/Postop Comments:  - Uneventful, ready for discharge         Last vitals:  Vitals:    04/20/21 1141 04/20/21 1150 04/20/21 1200   BP: 98/52 109/53 104/58   Pulse: 64 56 54   Resp: 18 16 16   Temp: 37  C (98.6  F)     SpO2: 98% 97% 96%       Last vitals prior to Anesthesia Care Transfer:  CRNA VITALS  4/20/2021 1107 - 4/20/2021 1207      4/20/2021             NIBP:  (!) 88/56    Pulse:  62    NIBP Mean:  62    Temp:  36.7  C (98.1  F)    Resp Rate (observed):  12    EKG:  Sinus bradycardia          Electronically Signed By: Kyle Palomino MD  April 20, 2021  12:14 PM

## 2021-04-20 NOTE — LETTER
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 Lazaro presented with acute onset cough and was found to have an anterior mediastinal mass and malignant left-sided pleural effusion.  A CT guided biopsy was obtained at [facility] and pathology was consistent with T-cell leukemia vs lymphoma.  He was admitted to [facility] oncology service 8/30 and started on treatment per [id number].  He had a pleural effusion that required a chest tube and a pericardial effusion that was not drained. His Induction was complicated by cardiac compression secondary to his mass leading to tamponade, this improved through out his hospital stay.  He also had dysphagia that improved with treatment of his mass, swallow study was normal. His course was complicated by extensive bilateral UE DVTs, and he was successfully treated with anticoagulation. His consolidation course was complicated by the development of severe asparaginase induced necrotizing pancreatitis. He became quite ill with SIRS and associated hypotension, he required pressor support in the ICU. As a result, asparaginase was eliminated from his treatment plan. He was in CR status at end of consolidation. During maintenance, he developed hepatotoxicity so allopurinol and dose reduced 6MP were started for correction of skewed 6-MP metabolism.  Lazaro comes to clinic today for Day 29 of his 5th Maintenance cycle, with a make up lumbar puncture.     HPI:   Since his last visit Lazaro has been doing very well. His energy level has been good. He is eating and drinking well. No GI upset. Having daily soft stools. Denies pain. No skin concerns. No paresthesias or gait changes. Denies fever or respiratory symptoms.    Lazaro does not some intermittent mucositis and/or canker sores. He notes that they occur for approximately one  week at a time and then go away without intervention. He typically gets them on his tongue, lower lip, and left cheek. This has not impacted oral intake.    He hasn't gotten the COVID vaccine yet. Continues to be interested in this.     Review of systems:  The 10 point Review of Systems is negative other than noted in the HPI or here.      PMH:   Past Medical History:   Diagnosis Date     Acute necrotizing pancreatitis 11/07/2019    attributed to asparaginase     Acute pancreatitis due to PEGaspariginase therapy  11/15/2019     DVT of upper extremity (deep vein thrombosis) (H) 09/26/2019    Bilateral      Edema of upper extremity 10/17/2019     Folliculitis 10/17/2019     Migraine 2006    have resolved     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     -- Spinal HA following LP  -- TPMT shows Intermediate activity with normal TPMT/NUDT15 genotype  -- Factor V leiden and prothrombin gene mutation negative  -- Necrotizing pancreatitis secondary to asparaginase  -- former smoker, quit with the use of nicotine patches. Former daily marajuana smoker, now only uses occasionally  PFMH:   Family History   Problem Relation Age of Onset     No Known Problems Mother      No Known Problems Father      Asthma Brother      Thyroid Cancer Paternal Grandmother      Melanoma Paternal Aunt        Social History:   Social History     Social History Narrative    Lazaro previously lived at home with his dad and step mom in [place] but is now staying with his grandma in [place]. Biological mother is involved in his life. Has 4 step-siblings and 1 biological sibling. 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 Has completed high school. 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  He has been enjoying fishing and video games.        Current Medications:  Current Outpatient Medications on File Prior to Visit    Medication Sig Dispense Refill     allopurinol (ZYLOPRIM) 100 MG tablet Take 1 tablet (100 mg) by mouth daily 30 tablet 11     diphenhydrAMINE (BENADRYL) 25 MG capsule Take 1-2 capsules (25-50 mg) by mouth every 6 hours as needed (Breakthrough Nausea and Vomiting ) 30 capsule 1     lidocaine-prilocaine (EMLA) 2.5-2.5 % external cream Apply topically as needed for other (prior to port access) 30 g 6     mercaptopurine (PURINETHOL) 50 MG tablet Take 50mg (1 tablet) three days/week and 75mg (1.5 tablets) four days/week. 40 tablet 2     methotrexate 2.5 MG tablet Take 16 tablets (40 mg) by mouth once a week Do not take on Days of LP. 64 tablet 2     ondansetron (ZOFRAN-ODT) 8 MG ODT tab Take 1 tablet (8 mg) by mouth every 8 hours as needed for nausea 20 tablet 6     pantoprazole (PROTONIX) 40 MG EC tablet Take 1 tablet (40 mg) by mouth every morning (before breakfast) During weeks of taking prednisone. 30 tablet 2     polyethylene glycol (MIRALAX/GLYCOLAX) packet Take 17 g by mouth daily 30 packet 0     predniSONE (DELTASONE) 10 MG tablet Take 40mg (4 tabs) twice dialy for 5 days every 4 weeks. 40 tablet 2     sennosides (SENOKOT) 8.6 MG tablet Take 2 tablets by mouth 2 times daily as needed for constipation 60 each 1     sulfamethoxazole-trimethoprim (BACTRIM DS) 800-160 MG tablet Take 1 tablet by mouth Every Mon, Tues two times daily 16 tablet 11     Vitamin D, Cholecalciferol, 25 MCG (1000 UT) TABS Take 1 tablet (1,000 Units) by mouth daily 30 tablet 3     Reviewed medications with Lazaro. Confirmed oral chemo doses, has not missed any doses. He is not taking Vitamin D currently.  Received [id number] influenza vaccine on 12/22/20     Physical Exam:   Temp:  [98.1  F (36.7  C)-98.6  F (37  C)] 98.6  F (37  C)  Pulse:  [54-64] 55  Resp:  [16-18] 16  BP: ()/(49-58) 100/49  SpO2:  [96 %-100 %] 97 %  Wt Readings from Last 4 Encounters:   04/20/21 77.7 kg (171 lb 4.8 oz)   03/23/21 76.3 kg (168 lb 3.4 oz)   02/23/21  "76.2 kg (167 lb 15.9 oz)   02/23/21 76.5 kg (168 lb 10.4 oz)     Ht Readings from Last 2 Encounters:   04/20/21 1.845 m (6' 0.64\")   03/23/21 1.843 m (6' 0.56\")     General: Lazaro is alert, interactive and appropriate, well-appearing, in no distress  HEENT: Skull is atrauamatic and normocephalic.  Full head of hair. PERRLA, sclera are non icteric and not injected, EOM are intact. Nares are patent without drainage. Oropharynx clear, no plaques noted. Buccal mucosa and tongue clear with no plaques, white patches, or other lesions noted on exam. MMM.  Neck:  Supple without lymphadenopathy.   Lymph: There is no cervical, supraclavicular, axillary, lymphadenopathy palpated.  Cardiovascular:  HR is regular, S1, S2 no murmur.  Capillary refill is < 2 seconds.   Respiratory: No cough noted. Breathing effortlessly. Lungs are clear to auscultation throughout.  No crackles or wheezes.  Gastrointestinal: BS present in all quadrants.  Abdomen is soft and flat.  No pain with palpation. No hepatosplenomegaly or masses are palpated.  Skin: No concerning lesions  Neurological:  CN 2-12 grossly intact. Gait is normal.  No issues with balance. Sensation intact in hands and feet.     Musculoskeletal: Good strength and ROM in all extremities.  Strong dorsiflexion at ankles and great toes (5/5) bilaterally without any pain at the Achilles.     Labs:   Results for orders placed or performed during the hospital encounter of 04/20/21   Asymptomatic SARS-CoV-2 COVID-19 Virus (Coronavirus) by PCR     Status: None    Specimen: Nasopharyngeal   Result Value Ref Range    SARS-CoV-2 Virus Specimen Source Nasopharyngeal     SARS-CoV-2 PCR Result NEGATIVE     SARS-CoV-2 PCR Comment (Note)    Results for orders placed or performed in visit on 04/20/21   CBC with platelets differential     Status: Abnormal   Result Value Ref Range    WBC 2.2 (L) 4.0 - 11.0 10e9/L    RBC Count 3.30 (L) 4.4 - 5.9 10e12/L    Hemoglobin 12.0 (L) 13.3 - 17.7 g/dL    " Hematocrit 34.4 (L) 40.0 - 53.0 %     (H) 78 - 100 fl    MCH 36.4 (H) 26.5 - 33.0 pg    MCHC 34.9 31.5 - 36.5 g/dL    RDW 14.4 10.0 - 15.0 %    Platelet Count 180 150 - 450 10e9/L    Diff Method Automated Method     % Neutrophils 68.2 %    % Lymphocytes 18.9 %    % Monocytes 9.7 %    % Eosinophils 2.3 %    % Basophils 0.9 %    % Immature Granulocytes 0.0 %    Nucleated RBCs 0 0 /100    Absolute Neutrophil 1.5 (L) 1.6 - 8.3 10e9/L    Absolute Lymphocytes 0.4 (L) 0.8 - 5.3 10e9/L    Absolute Monocytes 0.2 0.0 - 1.3 10e9/L    Absolute Eosinophils 0.1 0.0 - 0.7 10e9/L    Absolute Basophils 0.0 0.0 - 0.2 10e9/L    Abs Immature Granulocytes 0.0 0 - 0.4 10e9/L    Absolute Nucleated RBC 0.0      The following tests were ordered and interpreted by me today:  -- CBC with differential    Assessment:  Lazaro Lund is a 20 year old young man with T cell lymphoblastic lymphoma (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  He is being treated per COG protocol [id number]. He's had a CRu following Induction with a CR at the end of Consolidation. His course was complicated by extensive bilateral DVT and severe necrotizing pancreatitis requiring cessation of PEG-asparaginase from his treatment.  He comes to clinic today for Day 29 of Cycle 5 of Maintenance therapy, including a make up lumbar puncture.    Overall, he has been doing well. He is currently on 43% full dose of 6MP with allopurinol for correction of skewed 6-MP metabolism and 100% methotrexate. ANC is in target range - 1.5.  He hasn't gotten the COVID vaccine yet. Mild intermittent episodes of mucositis, not impacting oral hydration or caloric intake. No other concerns today.    Plan:   1) Labs reviewed with Lazaro. Continue with currently prescribed 43% 6MP (50 mg 3 days/week and 75 mg 4 days/week) and 100% Methotrexate (16 tabs). Proceed with chemotherapy and LP.  2) Start prednisone burst.   3) Protonix during steroid bursts.   4) Most recent Vit D level was normal however Lazaro  should continue on Vit D 1000IU daily.  5) Bactrim for PCP prophylaxis.   6) Continue to check CMP and TGNs monthly moving forward. Pending from today.  7) Lazaro will need COVID testing prior to LPs moving forward.  He prefers to come the day of, have it done in sedation and then come to clinic for chemo while he is waiting for results.   8) Discussed COVID vaccine in detail.  Lazaro is willing to get vaccinated.  Discussed different vaccines and that he can schedule it through [facility].  I would like him to avoid vaccination during his prednisone burst or for the week after in order to maximize his immune response.   9) RTC in 4 weeks for D57 of Cycle 5    Marie Damon CNP    Total time spent on the following services on the date of the encounter:  -- Ordering medications, test, procedures, chemotherapy  -- Interpretation of labs and imaging    -- Performing a medically appropriate examination  -- Counseling and educating the patient/family/caregiver  -- Documenting clinical information in the electronic  -- Communicating results to the patient/family/caregiver  -- Care coordination  -- Total time spent: 50 minutes    Procedure Note  A Lumbar Puncture was performed in the Pediatric Sedation Suite. Informed consent was obtained prior to the procedure. Lazaro Lund was identified by facial recognition and ID arm band. A time-out was performed. Lazaro Lund was then placed in the left lateral decubitus position and the lumbosacral area was sterily prepped using Chloraprep followed by drape placement. Anatomic landmarks were identified by palpation. Then, a 22 gauge, 3.5 inch Slava spinal needle was unsuccessfully attempted to be inserted into the L3/L4 interspace. Lazaro was repositioned. On the second attempt, a 22 gauge, 3.5 inch Slava spinal needle was easily into the L3/L4 interspace. Aproximately 3 mL of clear and colorless cerebrospinal fluid was obtained to be sent to the lab for cell count  analysis and cytospin. Following that, 15mg of intrathecal methotrexate in 6 mL of preservative-free normal saline was infused without resistance. The needle was removed and a Band-Aid applied. Lazaro PLUNKETT Kalerobbin tolerated this procedure very well.    Marie Damon CNP performed the procedure. Luana Van CNP was present and available for assistance throughout the entire procedure.      Marie Damon, ANGELY

## 2021-04-21 LAB
APPEARANCE CSF: CLEAR
COLOR CSF: COLORLESS
RBC # CSF MANUAL: 18 /UL (ref 0–2)
TUBE # CSF: 1 #
WBC # CSF MANUAL: 2 /UL (ref 0–5)

## 2021-04-22 LAB
6-TGN ENTSUB RBC: 810 PMOL/8X10(8)RBC (ref 235–450)
6MMP ENTSUB RBC: <401 PMOL/8X10(8)RBC

## 2021-05-18 ENCOUNTER — INFUSION THERAPY VISIT (OUTPATIENT)
Dept: INFUSION THERAPY | Facility: CLINIC | Age: 21
End: 2021-05-18
Attending: PEDIATRICS
Payer: COMMERCIAL

## 2021-05-18 ENCOUNTER — OFFICE VISIT (OUTPATIENT)
Dept: PEDIATRIC HEMATOLOGY/ONCOLOGY | Facility: CLINIC | Age: 21
End: 2021-05-18
Attending: PATHOLOGY
Payer: COMMERCIAL

## 2021-05-18 VITALS
OXYGEN SATURATION: 100 % | WEIGHT: 170.86 LBS | HEIGHT: 73 IN | HEART RATE: 70 BPM | SYSTOLIC BLOOD PRESSURE: 113 MMHG | TEMPERATURE: 98 F | RESPIRATION RATE: 16 BRPM | DIASTOLIC BLOOD PRESSURE: 62 MMHG | BODY MASS INDEX: 22.64 KG/M2

## 2021-05-18 DIAGNOSIS — C83.50 T LYMPHOBLASTIC LYMPHOMA (H): Primary | ICD-10-CM

## 2021-05-18 LAB
BASOPHILS # BLD AUTO: 0 10E9/L (ref 0–0.2)
BASOPHILS NFR BLD AUTO: 0.4 %
DIFFERENTIAL METHOD BLD: ABNORMAL
EOSINOPHIL # BLD AUTO: 0.1 10E9/L (ref 0–0.7)
EOSINOPHIL NFR BLD AUTO: 4 %
ERYTHROCYTE [DISTWIDTH] IN BLOOD BY AUTOMATED COUNT: 13.4 % (ref 10–15)
HCT VFR BLD AUTO: 35.3 % (ref 40–53)
HGB BLD-MCNC: 12.5 G/DL (ref 13.3–17.7)
IMM GRANULOCYTES # BLD: 0 10E9/L (ref 0–0.4)
IMM GRANULOCYTES NFR BLD: 0 %
LYMPHOCYTES # BLD AUTO: 0.4 10E9/L (ref 0.8–5.3)
LYMPHOCYTES NFR BLD AUTO: 12.9 %
MCH RBC QN AUTO: 36.1 PG (ref 26.5–33)
MCHC RBC AUTO-ENTMCNC: 35.4 G/DL (ref 31.5–36.5)
MCV RBC AUTO: 102 FL (ref 78–100)
MONOCYTES # BLD AUTO: 0.2 10E9/L (ref 0–1.3)
MONOCYTES NFR BLD AUTO: 6.8 %
NEUTROPHILS # BLD AUTO: 2.1 10E9/L (ref 1.6–8.3)
NEUTROPHILS NFR BLD AUTO: 75.9 %
NRBC # BLD AUTO: 0 10*3/UL
NRBC BLD AUTO-RTO: 0 /100
PLATELET # BLD AUTO: 198 10E9/L (ref 150–450)
RBC # BLD AUTO: 3.46 10E12/L (ref 4.4–5.9)
WBC # BLD AUTO: 2.8 10E9/L (ref 4–11)

## 2021-05-18 PROCEDURE — 96409 CHEMO IV PUSH SNGL DRUG: CPT

## 2021-05-18 PROCEDURE — 250N000011 HC RX IP 250 OP 636

## 2021-05-18 PROCEDURE — 258N000003 HC RX IP 258 OP 636: Performed by: PEDIATRICS

## 2021-05-18 PROCEDURE — 80299 QUANTITATIVE ASSAY DRUG: CPT | Performed by: NURSE PRACTITIONER

## 2021-05-18 PROCEDURE — 258N000003 HC RX IP 258 OP 636: Performed by: NURSE PRACTITIONER

## 2021-05-18 PROCEDURE — 250N000011 HC RX IP 250 OP 636: Performed by: NURSE PRACTITIONER

## 2021-05-18 PROCEDURE — 99215 OFFICE O/P EST HI 40 MIN: CPT | Mod: GC | Performed by: STUDENT IN AN ORGANIZED HEALTH CARE EDUCATION/TRAINING PROGRAM

## 2021-05-18 PROCEDURE — 85025 COMPLETE CBC W/AUTO DIFF WBC: CPT | Performed by: NURSE PRACTITIONER

## 2021-05-18 RX ORDER — HEPARIN SODIUM (PORCINE) LOCK FLUSH IV SOLN 100 UNIT/ML 100 UNIT/ML
3-5 SOLUTION INTRAVENOUS
Status: CANCELLED | OUTPATIENT
Start: 2021-05-18

## 2021-05-18 RX ORDER — HEPARIN SODIUM (PORCINE) LOCK FLUSH IV SOLN 100 UNIT/ML 100 UNIT/ML
SOLUTION INTRAVENOUS
Status: COMPLETED
Start: 2021-05-18 | End: 2021-05-18

## 2021-05-18 RX ORDER — LIDOCAINE 40 MG/G
CREAM TOPICAL
Status: CANCELLED | OUTPATIENT
Start: 2021-05-18

## 2021-05-18 RX ORDER — HEPARIN SODIUM (PORCINE) LOCK FLUSH IV SOLN 100 UNIT/ML 100 UNIT/ML
3-5 SOLUTION INTRAVENOUS
Status: DISCONTINUED | OUTPATIENT
Start: 2021-05-18 | End: 2021-05-18 | Stop reason: HOSPADM

## 2021-05-18 RX ADMIN — HEPARIN SODIUM (PORCINE) LOCK FLUSH IV SOLN 100 UNIT/ML 500 UNITS: 100 SOLUTION at 12:36

## 2021-05-18 RX ADMIN — VINCRISTINE SULFATE 2 MG: 1 INJECTION, SOLUTION INTRAVENOUS at 12:27

## 2021-05-18 RX ADMIN — HEPARIN 500 UNITS: 100 SYRINGE at 12:36

## 2021-05-18 RX ADMIN — SODIUM CHLORIDE 50 ML: 9 INJECTION, SOLUTION INTRAVENOUS at 12:27

## 2021-05-18 ASSESSMENT — PAIN SCALES - GENERAL: PAINLEVEL: NO PAIN (0)

## 2021-05-18 ASSESSMENT — MIFFLIN-ST. JEOR: SCORE: 1817.49

## 2021-05-18 NOTE — LETTER
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 Lazaro presented with acute onset cough and was found to have an anterior mediastinal mass and malignant left-sided pleural effusion.  A CT guided biopsy was obtained at [facility] and pathology was consistent with T-cell leukemia vs lymphoma.  He was admitted to [facility] oncology service 8/30 and started on treatment per [id number].  He had a pleural effusion that required a chest tube and a pericardial effusion that was not drained. His Induction was complicated by cardiac compression secondary to his mass leading to tamponade, this improved through out his hospital stay.  He also had dysphagia that improved with treatment of his mass, swallow study was normal. His course was complicated by extensive bilateral UE DVTs, and he was successfully treated with anticoagulation. His consolidation course was complicated by the development of severe asparaginase induced necrotizing pancreatitis. He became quite ill with SIRS and associated hypotension, he required pressor support in the ICU. As a result, asparaginase was eliminated from his treatment plan. He was in CR status at end of consolidation. During maintenance, he developed hepatotoxicity so allopurinol and dose reduced 6MP were started for correction of skewed 6-MP metabolism. Lazaro comes to clinic today for Day 57 of his 5th Maintenance cycle.     HPI:   Since his last visit Lazaro has been doing very well. His energy level has been good. He is eating and drinking well. No GI upset. Having daily soft stools. Denies pain. No skin concerns. No paresthesias or gait changes. Denies fever or respiratory symptoms.    He is working a lot of required overtime currently and feels tired outside of work. He states that he has only missed one dose of his 6MP. He  feels like his health is largely back to normal, and looks forward to seeing if he will feel even better when he has finished maintenance therapy. He hasn't gotten the COVID vaccine yet. Continues to be interested in this.     Review of systems:  The 10 point Review of Systems is negative other than noted in the HPI or here.      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 Former daily marajuana smoker, now only uses occasionally    PFMH:   Family History   Problem Relation Age of Onset     No Known Problems Mother      No Known Problems Father      Asthma Brother      Thyroid Cancer Paternal Grandmother      Melanoma Paternal Aunt        Social History:   Social History     Social History Narrative    Lazaro previously lived at home with his dad and step mom in [place] but is now staying with his grandma in [place]. Biological mother is involved in his life. Has 4 step-siblings and 1 biological sibling. 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 Has completed high school. 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  He has been enjoying fishing and video games.        Current Medications:  Current Outpatient Medications on File Prior to Visit    Medication Sig Dispense Refill     allopurinol (ZYLOPRIM) 100 MG tablet Take 1 tablet (100 mg) by mouth daily 30 tablet 11     diphenhydrAMINE (BENADRYL) 25 MG capsule Take 1-2 capsules (25-50 mg) by mouth every 6 hours as needed (Breakthrough Nausea and Vomiting ) 30 capsule 1     lidocaine-prilocaine (EMLA) 2.5-2.5 % external cream Apply topically as needed for other (prior to port access) 30 g 6     mercaptopurine (PURINETHOL) 50 MG tablet Take 50mg (1 tablet) three days/week and 75mg (1.5 tablets) four days/week. 40 tablet 2     methotrexate 2.5 MG tablet Take 16 tablets (40 mg) by mouth once a week Do not take on Days of LP. 64 tablet 2     ondansetron (ZOFRAN-ODT) 8 MG ODT tab Take 1 tablet (8 mg) by mouth every 8 hours as needed for nausea 20 tablet 6     pantoprazole (PROTONIX) 40 MG EC tablet Take 1 tablet (40 mg) by mouth every morning (before breakfast) During weeks of taking prednisone. 30 tablet 2     polyethylene glycol (MIRALAX/GLYCOLAX) packet Take 17 g by mouth daily 30 packet 0     predniSONE (DELTASONE) 10 MG tablet Take 40mg (4 tabs) twice dialy for 5 days every 4 weeks. 40 tablet 2     sennosides (SENOKOT) 8.6 MG tablet Take 2 tablets by mouth 2 times daily as needed for constipation 60 each 1     sulfamethoxazole-trimethoprim (BACTRIM DS) 800-160 MG tablet Take 1 tablet by mouth Every Mon, Tues two times daily 16 tablet 11     Vitamin D, Cholecalciferol, 25 MCG (1000 UT) TABS Take 1 tablet (1,000 Units) by mouth daily 30 tablet 3     Reviewed medications with Lazaro. Confirmed oral chemo doses, and notes only missing 1 dose of 6MP. He is not taking Vitamin D currently.  Received [id number] influenza vaccine on 12/22/20     Physical Exam:   Temp:  [98  F (36.7  C)] 98  F (36.7  C)  Pulse:  [70] 70  Resp:  [16] 16  BP: (113)/(62) 113/62  SpO2:  [100 %] 100 %  Wt Readings from Last 4 Encounters:   05/18/21 77.5 kg (170 lb 13.7 oz)   04/20/21 77.7 kg (171 lb 4.8 oz)   03/23/21 76.3 kg (168 lb 3.4  "oz)   02/23/21 76.2 kg (167 lb 15.9 oz)     Ht Readings from Last 2 Encounters:   05/18/21 1.844 m (6' 0.6\")   04/20/21 1.845 m (6' 0.64\")     General: Lazaro is alert, interactive and appropriate, well-appearing, in no distress  HEENT: Skull is atrauamatic and normocephalic.  Full head of hair. PERRLA, sclera are non icteric and not injected, EOM are intact. Nares are patent without drainage. Oropharynx clear, no plaques noted. Buccal mucosa and tongue clear with no plaques, white patches, or other lesions noted on exam. MMM.  Neck:  Supple without lymphadenopathy.   Lymph: There is no cervical, supraclavicular, axillary, lymphadenopathy palpated.  Cardiovascular:  HR is regular, S1, S2 no murmur.  Capillary refill is < 2 seconds.   Respiratory: No cough noted. Breathing effortlessly. Lungs are clear to auscultation throughout.  No crackles or wheezes.  Gastrointestinal: BS present in all quadrants.  Abdomen is soft and flat.  No pain with palpation. No hepatosplenomegaly or masses are palpated.  Skin: No concerning lesions  Neurological:  CN 2-12 grossly intact. Gait is normal.  No issues with balance. Sensation intact in hands and feet.     Musculoskeletal: Good strength and ROM in all extremities.  Strong dorsiflexion at ankles and great toes (5/5) bilaterally without any pain at the Achilles.     Labs:   Results for orders placed or performed in visit on 05/18/21   CBC with platelets differential     Status: Abnormal   Result Value Ref Range    WBC 2.8 (L) 4.0 - 11.0 10e9/L    RBC Count 3.46 (L) 4.4 - 5.9 10e12/L    Hemoglobin 12.5 (L) 13.3 - 17.7 g/dL    Hematocrit 35.3 (L) 40.0 - 53.0 %     (H) 78 - 100 fl    MCH 36.1 (H) 26.5 - 33.0 pg    MCHC 35.4 31.5 - 36.5 g/dL    RDW 13.4 10.0 - 15.0 %    Platelet Count 198 150 - 450 10e9/L    Diff Method Automated Method     % Neutrophils 75.9 %    % Lymphocytes 12.9 %    % Monocytes 6.8 %    % Eosinophils 4.0 %    % Basophils 0.4 %    % Immature Granulocytes " 0.0 %    Nucleated RBCs 0 0 /100    Absolute Neutrophil 2.1 1.6 - 8.3 10e9/L    Absolute Lymphocytes 0.4 (L) 0.8 - 5.3 10e9/L    Absolute Monocytes 0.2 0.0 - 1.3 10e9/L    Absolute Eosinophils 0.1 0.0 - 0.7 10e9/L    Absolute Basophils 0.0 0.0 - 0.2 10e9/L    Abs Immature Granulocytes 0.0 0 - 0.4 10e9/L    Absolute Nucleated RBC 0.0    Thiopurine Metabolites by LC-MS/MS     Status: Abnormal   Result Value Ref Range    6 Thioguanine 849 (H) 235 - 450 pmol/8x10(8)RBC    6 Methylmercaptopurine <438 < or = 5700 pmol/8x10(8)RBC     The following tests were ordered and interpreted by me today:  -- CBC with differential     Assessment:  Lazaro Lund is a 21 year old young man with T cell lymphoblastic lymphoma (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  He is being treated per COG protocol [id number]. He's had a CRu following Induction with a CR at the end of Consolidation. His course was complicated by extensive bilateral DVT and severe necrotizing pancreatitis requiring cessation of PEG-asparaginase from his treatment.  He comes to clinic today for Day 57 of Cycle 5 of Maintenance therapy.    Overall, he has been doing well. He is currently on 43% full dose of 6MP with allopurinol for correction of skewed 6-MP metabolism and 100% methotrexate. ANC is above target range this month.    Plan:   1) Labs reviewed with Lazaro. Continue with currently prescribed 43% 6MP (50 mg 3 days/week and 75 mg 4 days/week [450mg total / 1050mg protocol directed dose]) and 100% Methotrexate (16 tabs). Will reassess ANC next month to determine if he would benefit from dose escalation  2) BSA reviewed today. No changes to chemotherapy doses for cycle 6 unless dose is escalated as above  3) Start prednisone burst and vincristine administered today  4) Protonix during steroid bursts.   5) Most recent Vit D level was normal however Lazaro should continue on Vit D 1000IU daily.  6) Bactrim for PCP prophylaxis.   7) Continue to check TGNs monthly moving forward.    8) Lazaro will need COVID testing prior to LPs moving forward.  He prefers to come the day of, have it done in sedation and then come to clinic for chemo while he is waiting for results.   9) Discussed COVID vaccine in detail.  Lazaro is willing to get vaccinated.  Discussed different vaccines and that he can schedule it through [facility].  I would like him to avoid vaccination during his prednisone burst or for the week after in order to maximize his immune response.   10) RTC in 4 weeks for Day 1 of cycle 6 with lumbar puncture    Signed,  Nicho Fischer   Pediatric Hematology/Oncology Fellow    Physician Attestation    I saw and evaluated the patient. I discussed with the fellow and agree with the findings and plan as documented in the fellow's note.     Reynaldo Campuzano MD  Pediatric Hematology/Oncology  [facility]    Total time spent on the following services on the date of the encounter:  -- Ordering medications, tests, procedures, chemotherapy  -- Interpretation of labs and imaging    -- Performing a medically appropriate examination  -- Counseling and educating the patient/family/caregiver  -- Documenting clinical information in the electronic  -- Communicating results to the patient/family/caregiver  -- Care coordination  -- Total time spent: 50 minutes      Nicho Fischer MD

## 2021-05-18 NOTE — PROGRESS NOTES
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 Lazaro presented with acute onset cough and was found to have an anterior mediastinal mass and malignant left-sided pleural effusion.  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  He was admitted to [facility] oncology service 8/30 and started on treatment per [id number].  He had a pleural effusion that required a chest tube and a pericardial effusion that was not drained. His Induction was complicated by cardiac compression secondary to his mass leading to tamponade, this improved through out his hospital stay.  He also had dysphagia that improved with treatment of his mass, swallow study was normal. His course was complicated by extensive bilateral UE DVTs, and he was successfully treated with anticoagulation. His consolidation course was complicated by the development of severe asparaginase induced necrotizing pancreatitis. He became quite ill with SIRS and associated hypotension, he required pressor support in the ICU. As a result, asparaginase was eliminated from his treatment plan. He was in CR status at end of consolidation. During maintenance, he developed hepatotoxicity so allopurinol and dose reduced 6MP were started for correction of skewed 6-MP metabolism. Lazaro comes to clinic today for Day 57 of his 5th Maintenance cycle.     HPI:   Since his last visit Lazaro has been doing very well. His energy level has been good. He is eating and drinking well. No GI upset. Having daily soft stools. Denies pain. No skin concerns. No paresthesias or gait changes. Denies fever or respiratory symptoms.    He is working a lot of required overtime currently and feels tired outside of work. He states that he has only missed one dose of his 6MP. He feels like his health is largely back to normal, and looks forward to seeing if he will feel  even better when he has finished maintenance therapy. He hasn't gotten the COVID vaccine yet. Continues to be interested in this.     Review of systems:  The 10 point Review of Systems is negative other than noted in the HPI or here.      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 Former daily marajuana smoker, now only uses occasionally    PFMH:   Family History   Problem Relation Age of Onset     No Known Problems Mother      No Known Problems Father      Asthma Brother      Thyroid Cancer Paternal Grandmother      Melanoma Paternal Aunt        Social History:   Social History     Social History Narrative    Lazaro previously lived at home with his dad and step mom in [place] but is now staying with his grandma in [place]. Biological mother is involved in his life. Has 4 step-siblings and 1 biological sibling. 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 Has completed high school. 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  He has been enjoying fishing and video games.        Current Medications:  Current Outpatient Medications on File Prior to Visit   Medication Sig Dispense Refill     allopurinol (ZYLOPRIM) 100 MG tablet Take 1 tablet (100 mg)  by mouth daily 30 tablet 11     diphenhydrAMINE (BENADRYL) 25 MG capsule Take 1-2 capsules (25-50 mg) by mouth every 6 hours as needed (Breakthrough Nausea and Vomiting ) 30 capsule 1     lidocaine-prilocaine (EMLA) 2.5-2.5 % external cream Apply topically as needed for other (prior to port access) 30 g 6     mercaptopurine (PURINETHOL) 50 MG tablet Take 50mg (1 tablet) three days/week and 75mg (1.5 tablets) four days/week. 40 tablet 2     methotrexate 2.5 MG tablet Take 16 tablets (40 mg) by mouth once a week Do not take on Days of LP. 64 tablet 2     ondansetron (ZOFRAN-ODT) 8 MG ODT tab Take 1 tablet (8 mg) by mouth every 8 hours as needed for nausea 20 tablet 6     pantoprazole (PROTONIX) 40 MG EC tablet Take 1 tablet (40 mg) by mouth every morning (before breakfast) During weeks of taking prednisone. 30 tablet 2     polyethylene glycol (MIRALAX/GLYCOLAX) packet Take 17 g by mouth daily 30 packet 0     predniSONE (DELTASONE) 10 MG tablet Take 40mg (4 tabs) twice dialy for 5 days every 4 weeks. 40 tablet 2     sennosides (SENOKOT) 8.6 MG tablet Take 2 tablets by mouth 2 times daily as needed for constipation 60 each 1     sulfamethoxazole-trimethoprim (BACTRIM DS) 800-160 MG tablet Take 1 tablet by mouth Every Mon, Tues two times daily 16 tablet 11     Vitamin D, Cholecalciferol, 25 MCG (1000 UT) TABS Take 1 tablet (1,000 Units) by mouth daily 30 tablet 3     Reviewed medications with Lazaro. Confirmed oral chemo doses, and notes only missing 1 dose of 6MP. He is not taking Vitamin D currently.  Received [id number] influenza vaccine on 12/22/20     Physical Exam:   Temp:  [98  F (36.7  C)] 98  F (36.7  C)  Pulse:  [70] 70  Resp:  [16] 16  BP: (113)/(62) 113/62  SpO2:  [100 %] 100 %  Wt Readings from Last 4 Encounters:   05/18/21 77.5 kg (170 lb 13.7 oz)   04/20/21 77.7 kg (171 lb 4.8 oz)   03/23/21 76.3 kg (168 lb 3.4 oz)   02/23/21 76.2 kg (167 lb 15.9 oz)     Ht Readings from Last 2 Encounters:   05/18/21 1.844  "m (6' 0.6\")   04/20/21 1.845 m (6' 0.64\")     General: Lazaro is alert, interactive and appropriate, well-appearing, in no distress  HEENT: Skull is atrauamatic and normocephalic.  Full head of hair. PERRLA, sclera are non icteric and not injected, EOM are intact. Nares are patent without drainage. Oropharynx clear, no plaques noted. Buccal mucosa and tongue clear with no plaques, white patches, or other lesions noted on exam. MMM.  Neck:  Supple without lymphadenopathy.   Lymph: There is no cervical, supraclavicular, axillary, lymphadenopathy palpated.  Cardiovascular:  HR is regular, S1, S2 no murmur.  Capillary refill is < 2 seconds.   Respiratory: No cough noted. Breathing effortlessly. Lungs are clear to auscultation throughout.  No crackles or wheezes.  Gastrointestinal: BS present in all quadrants.  Abdomen is soft and flat.  No pain with palpation. No hepatosplenomegaly or masses are palpated.  Skin: No concerning lesions  Neurological:  CN 2-12 grossly intact. Gait is normal.  No issues with balance. Sensation intact in hands and feet.     Musculoskeletal: Good strength and ROM in all extremities.  Strong dorsiflexion at ankles and great toes (5/5) bilaterally without any pain at the Achilles.     Labs:   Results for orders placed or performed in visit on 05/18/21   CBC with platelets differential     Status: Abnormal   Result Value Ref Range    WBC 2.8 (L) 4.0 - 11.0 10e9/L    RBC Count 3.46 (L) 4.4 - 5.9 10e12/L    Hemoglobin 12.5 (L) 13.3 - 17.7 g/dL    Hematocrit 35.3 (L) 40.0 - 53.0 %     (H) 78 - 100 fl    MCH 36.1 (H) 26.5 - 33.0 pg    MCHC 35.4 31.5 - 36.5 g/dL    RDW 13.4 10.0 - 15.0 %    Platelet Count 198 150 - 450 10e9/L    Diff Method Automated Method     % Neutrophils 75.9 %    % Lymphocytes 12.9 %    % Monocytes 6.8 %    % Eosinophils 4.0 %    % Basophils 0.4 %    % Immature Granulocytes 0.0 %    Nucleated RBCs 0 0 /100    Absolute Neutrophil 2.1 1.6 - 8.3 10e9/L    Absolute " Lymphocytes 0.4 (L) 0.8 - 5.3 10e9/L    Absolute Monocytes 0.2 0.0 - 1.3 10e9/L    Absolute Eosinophils 0.1 0.0 - 0.7 10e9/L    Absolute Basophils 0.0 0.0 - 0.2 10e9/L    Abs Immature Granulocytes 0.0 0 - 0.4 10e9/L    Absolute Nucleated RBC 0.0    Thiopurine Metabolites by LC-MS/MS     Status: Abnormal   Result Value Ref Range    6 Thioguanine 849 (H) 235 - 450 pmol/8x10(8)RBC    6 Methylmercaptopurine <438 < or = 5700 pmol/8x10(8)RBC     The following tests were ordered and interpreted by me today:  -- CBC with differential     Assessment:  Lazaro Lund is a 21 year old young man with T cell lymphoblastic lymphoma (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  He is being treated per COG protocol [id number]. He's had a CRu following Induction with a CR at the end of Consolidation. His course was complicated by extensive bilateral DVT and severe necrotizing pancreatitis requiring cessation of PEG-asparaginase from his treatment.  He comes to clinic today for Day 57 of Cycle 5 of Maintenance therapy.    Overall, he has been doing well. He is currently on 43% full dose of 6MP with allopurinol for correction of skewed 6-MP metabolism and 100% methotrexate. ANC is above target range this month.    Plan:   1) Labs reviewed with Lazaro. Continue with currently prescribed 43% 6MP (50 mg 3 days/week and 75 mg 4 days/week [450mg total / 1050mg protocol directed dose]) and 100% Methotrexate (16 tabs). Will reassess ANC next month to determine if he would benefit from dose escalation  2) BSA reviewed today. No changes to chemotherapy doses for cycle 6 unless dose is escalated as above  3) Start prednisone burst and vincristine administered today  4) Protonix during steroid bursts.   5) Most recent Vit D level was normal however Lazaro should continue on Vit D 1000IU daily.  6) Bactrim for PCP prophylaxis.   7) Continue to check TGNs monthly moving forward.   8) Lazaro will need COVID testing prior to LPs moving forward.  He prefers to come the  day of, have it done in sedation and then come to clinic for chemo while he is waiting for results.   9) Discussed COVID vaccine in detail.  Lazaro is willing to get vaccinated.  Discussed different vaccines and that he can schedule it through [employer].  I would like him to avoid vaccination during his prednisone burst or for the week after in order to maximize his immune response.   10) RTC in 4 weeks for Day 1 of cycle 6 with lumbar puncture    Signed,  Nicho Fischer   Pediatric Hematology/Oncology Fellow    Physician Attestation    I saw and evaluated the patient. I discussed with the fellow and agree with the findings and plan as documented in the fellow's note.     Reynaldo Campuzano MD  Pediatric Hematology/Oncology  [facility]    Total time spent on the following services on the date of the encounter:  -- Ordering medications, tests, procedures, chemotherapy  -- Interpretation of labs and imaging    -- Performing a medically appropriate examination  -- Counseling and educating the patient/family/caregiver  -- Documenting clinical information in the electronic  -- Communicating results to the patient/family/caregiver  -- Care coordination  -- Total time spent: 50 minutes

## 2021-05-18 NOTE — H&P (VIEW-ONLY)
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 Lazaro presented with acute onset cough and was found to have an anterior mediastinal mass and malignant left-sided pleural effusion.  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  He was admitted to [facility] oncology service 8/30 and started on treatment per [id number].  He had a pleural effusion that required a chest tube and a pericardial effusion that was not drained. His Induction was complicated by cardiac compression secondary to his mass leading to tamponade, this improved through out his hospital stay.  He also had dysphagia that improved with treatment of his mass, swallow study was normal. His course was complicated by extensive bilateral UE DVTs, and he was successfully treated with anticoagulation. His consolidation course was complicated by the development of severe asparaginase induced necrotizing pancreatitis. He became quite ill with SIRS and associated hypotension, he required pressor support in the ICU. As a result, asparaginase was eliminated from his treatment plan. He was in CR status at end of consolidation. During maintenance, he developed hepatotoxicity so allopurinol and dose reduced 6MP were started for correction of skewed 6-MP metabolism. Lazaro comes to clinic today for Day 57 of his 5th Maintenance cycle.     HPI:   Since his last visit Lazaro has been doing very well. His energy level has been good. He is eating and drinking well. No GI upset. Having daily soft stools. Denies pain. No skin concerns. No paresthesias or gait changes. Denies fever or respiratory symptoms.    He is working a lot of required overtime currently and feels tired outside of work. He states that he has only missed one dose of his 6MP. He feels like his health is largely back to normal, and looks forward to seeing if he will feel  even better when he has finished maintenance therapy. He hasn't gotten the COVID vaccine yet. Continues to be interested in this.     Review of systems:  The 10 point Review of Systems is negative other than noted in the HPI or here.      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 Former daily marajuana smoker, now only uses occasionally    PFMH:   Family History   Problem Relation Age of Onset     No Known Problems Mother      No Known Problems Father      Asthma Brother      Thyroid Cancer Paternal Grandmother      Melanoma Paternal Aunt        Social History:   Social History     Social History Narrative    Lazaro previously lived at home with his dad and step mom in [place] but is now staying with his grandma in [place]. Biological mother is involved in his life. Has 4 step-siblings and 1 biological sibling. 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 Has completed high school. 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  He has been enjoying fishing and video games.        Current Medications:  Current Outpatient Medications on File Prior to Visit   Medication Sig Dispense Refill     allopurinol (ZYLOPRIM) 100 MG tablet Take 1 tablet (100 mg)  by mouth daily 30 tablet 11     diphenhydrAMINE (BENADRYL) 25 MG capsule Take 1-2 capsules (25-50 mg) by mouth every 6 hours as needed (Breakthrough Nausea and Vomiting ) 30 capsule 1     lidocaine-prilocaine (EMLA) 2.5-2.5 % external cream Apply topically as needed for other (prior to port access) 30 g 6     mercaptopurine (PURINETHOL) 50 MG tablet Take 50mg (1 tablet) three days/week and 75mg (1.5 tablets) four days/week. 40 tablet 2     methotrexate 2.5 MG tablet Take 16 tablets (40 mg) by mouth once a week Do not take on Days of LP. 64 tablet 2     ondansetron (ZOFRAN-ODT) 8 MG ODT tab Take 1 tablet (8 mg) by mouth every 8 hours as needed for nausea 20 tablet 6     pantoprazole (PROTONIX) 40 MG EC tablet Take 1 tablet (40 mg) by mouth every morning (before breakfast) During weeks of taking prednisone. 30 tablet 2     polyethylene glycol (MIRALAX/GLYCOLAX) packet Take 17 g by mouth daily 30 packet 0     predniSONE (DELTASONE) 10 MG tablet Take 40mg (4 tabs) twice dialy for 5 days every 4 weeks. 40 tablet 2     sennosides (SENOKOT) 8.6 MG tablet Take 2 tablets by mouth 2 times daily as needed for constipation 60 each 1     sulfamethoxazole-trimethoprim (BACTRIM DS) 800-160 MG tablet Take 1 tablet by mouth Every Mon, Tues two times daily 16 tablet 11     Vitamin D, Cholecalciferol, 25 MCG (1000 UT) TABS Take 1 tablet (1,000 Units) by mouth daily 30 tablet 3     Reviewed medications with Lazaro. Confirmed oral chemo doses, and notes only missing 1 dose of 6MP. He is not taking Vitamin D currently.  Received [id number] influenza vaccine on 12/22/20     Physical Exam:   Temp:  [98  F (36.7  C)] 98  F (36.7  C)  Pulse:  [70] 70  Resp:  [16] 16  BP: (113)/(62) 113/62  SpO2:  [100 %] 100 %  Wt Readings from Last 4 Encounters:   05/18/21 77.5 kg (170 lb 13.7 oz)   04/20/21 77.7 kg (171 lb 4.8 oz)   03/23/21 76.3 kg (168 lb 3.4 oz)   02/23/21 76.2 kg (167 lb 15.9 oz)     Ht Readings from Last 2 Encounters:   05/18/21 1.844  "m (6' 0.6\")   04/20/21 1.845 m (6' 0.64\")     General: Lazaro is alert, interactive and appropriate, well-appearing, in no distress  HEENT: Skull is atrauamatic and normocephalic.  Full head of hair. PERRLA, sclera are non icteric and not injected, EOM are intact. Nares are patent without drainage. Oropharynx clear, no plaques noted. Buccal mucosa and tongue clear with no plaques, white patches, or other lesions noted on exam. MMM.  Neck:  Supple without lymphadenopathy.   Lymph: There is no cervical, supraclavicular, axillary, lymphadenopathy palpated.  Cardiovascular:  HR is regular, S1, S2 no murmur.  Capillary refill is < 2 seconds.   Respiratory: No cough noted. Breathing effortlessly. Lungs are clear to auscultation throughout.  No crackles or wheezes.  Gastrointestinal: BS present in all quadrants.  Abdomen is soft and flat.  No pain with palpation. No hepatosplenomegaly or masses are palpated.  Skin: No concerning lesions  Neurological:  CN 2-12 grossly intact. Gait is normal.  No issues with balance. Sensation intact in hands and feet.     Musculoskeletal: Good strength and ROM in all extremities.  Strong dorsiflexion at ankles and great toes (5/5) bilaterally without any pain at the Achilles.     Labs:   Results for orders placed or performed in visit on 05/18/21   CBC with platelets differential     Status: Abnormal   Result Value Ref Range    WBC 2.8 (L) 4.0 - 11.0 10e9/L    RBC Count 3.46 (L) 4.4 - 5.9 10e12/L    Hemoglobin 12.5 (L) 13.3 - 17.7 g/dL    Hematocrit 35.3 (L) 40.0 - 53.0 %     (H) 78 - 100 fl    MCH 36.1 (H) 26.5 - 33.0 pg    MCHC 35.4 31.5 - 36.5 g/dL    RDW 13.4 10.0 - 15.0 %    Platelet Count 198 150 - 450 10e9/L    Diff Method Automated Method     % Neutrophils 75.9 %    % Lymphocytes 12.9 %    % Monocytes 6.8 %    % Eosinophils 4.0 %    % Basophils 0.4 %    % Immature Granulocytes 0.0 %    Nucleated RBCs 0 0 /100    Absolute Neutrophil 2.1 1.6 - 8.3 10e9/L    Absolute " Lymphocytes 0.4 (L) 0.8 - 5.3 10e9/L    Absolute Monocytes 0.2 0.0 - 1.3 10e9/L    Absolute Eosinophils 0.1 0.0 - 0.7 10e9/L    Absolute Basophils 0.0 0.0 - 0.2 10e9/L    Abs Immature Granulocytes 0.0 0 - 0.4 10e9/L    Absolute Nucleated RBC 0.0    Thiopurine Metabolites by LC-MS/MS     Status: Abnormal   Result Value Ref Range    6 Thioguanine 849 (H) 235 - 450 pmol/8x10(8)RBC    6 Methylmercaptopurine <438 < or = 5700 pmol/8x10(8)RBC     The following tests were ordered and interpreted by me today:  -- CBC with differential     Assessment:  Lazaro Lund is a 21 year old young man with T cell lymphoblastic lymphoma (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  He is being treated per COG protocol [id number]. He's had a CRu following Induction with a CR at the end of Consolidation. His course was complicated by extensive bilateral DVT and severe necrotizing pancreatitis requiring cessation of PEG-asparaginase from his treatment.  He comes to clinic today for Day 57 of Cycle 5 of Maintenance therapy.    Overall, he has been doing well. He is currently on 43% full dose of 6MP with allopurinol for correction of skewed 6-MP metabolism and 100% methotrexate. ANC is above target range this month.    Plan:   1) Labs reviewed with Lazaro. Continue with currently prescribed 43% 6MP (50 mg 3 days/week and 75 mg 4 days/week [450mg total / 1050mg protocol directed dose]) and 100% Methotrexate (16 tabs). Will reassess ANC next month to determine if he would benefit from dose escalation  2) BSA reviewed today. No changes to chemotherapy doses for cycle 6 unless dose is escalated as above  3) Start prednisone burst and vincristine administered today  4) Protonix during steroid bursts.   5) Most recent Vit D level was normal however Lazaro should continue on Vit D 1000IU daily.  6) Bactrim for PCP prophylaxis.   7) Continue to check TGNs monthly moving forward.   8) Lazaro will need COVID testing prior to LPs moving forward.  He prefers to come the  day of, have it done in sedation and then come to clinic for chemo while he is waiting for results.   9) Discussed COVID vaccine in detail.  Lazaro is willing to get vaccinated.  Discussed different vaccines and that he can schedule it through [employer].  I would like him to avoid vaccination during his prednisone burst or for the week after in order to maximize his immune response.   10) RTC in 4 weeks for Day 1 of cycle 6 with lumbar puncture    Signed,  Nicho Fischer   Pediatric Hematology/Oncology Fellow    Physician Attestation    I saw and evaluated the patient. I discussed with the fellow and agree with the findings and plan as documented in the fellow's note.     Reynaldo Campuzano MD  Pediatric Hematology/Oncology  [facility]    Total time spent on the following services on the date of the encounter:  -- Ordering medications, tests, procedures, chemotherapy  -- Interpretation of labs and imaging    -- Performing a medically appropriate examination  -- Counseling and educating the patient/family/caregiver  -- Documenting clinical information in the electronic  -- Communicating results to the patient/family/caregiver  -- Care coordination  -- Total time spent: 50 minutes

## 2021-05-18 NOTE — PROGRESS NOTES
Infusion Nursing Note    Lazaro Lund Presents to [facility] today for: vincristine [id number]    Due to : T lymphoblastic lymphoma (H)    Intravenous Access/Labs: Port accessed per sterile technique without issue. Blood return noted, labs drawn as ordered.     Infusion Note: Vincristine infused without issue. Blood return noted pre/mid/post infusion. Port heparin locked and deaccessed without issue. Patient seen by Dr. Fischer while in clinic and picked up take home meds.     Discharge Plan:  Pt left [facility] in stable condition.

## 2021-05-20 RX ORDER — ALBUTEROL SULFATE 0.83 MG/ML
2.5 SOLUTION RESPIRATORY (INHALATION)
Status: CANCELLED | OUTPATIENT
Start: 2021-07-13

## 2021-05-20 RX ORDER — ALBUTEROL SULFATE 90 UG/1
1-2 AEROSOL, METERED RESPIRATORY (INHALATION)
Status: CANCELLED
Start: 2021-07-13

## 2021-05-20 RX ORDER — DIPHENHYDRAMINE HYDROCHLORIDE 50 MG/ML
50 INJECTION INTRAMUSCULAR; INTRAVENOUS
Status: CANCELLED
Start: 2021-07-13

## 2021-05-20 RX ORDER — DIPHENHYDRAMINE HYDROCHLORIDE 50 MG/ML
50 INJECTION INTRAMUSCULAR; INTRAVENOUS
Status: CANCELLED
Start: 2021-08-10

## 2021-05-20 RX ORDER — METHYLPREDNISOLONE SODIUM SUCCINATE 125 MG/2ML
125 INJECTION, POWDER, LYOPHILIZED, FOR SOLUTION INTRAMUSCULAR; INTRAVENOUS
Status: CANCELLED | OUTPATIENT
Start: 2021-06-15

## 2021-05-20 RX ORDER — EPINEPHRINE 1 MG/ML
0.3 INJECTION, SOLUTION, CONCENTRATE INTRAVENOUS EVERY 5 MIN PRN
Status: CANCELLED | OUTPATIENT
Start: 2021-06-15

## 2021-05-20 RX ORDER — ALBUTEROL SULFATE 0.83 MG/ML
2.5 SOLUTION RESPIRATORY (INHALATION)
Status: CANCELLED | OUTPATIENT
Start: 2021-06-15

## 2021-05-20 RX ORDER — EPINEPHRINE 1 MG/ML
0.3 INJECTION, SOLUTION, CONCENTRATE INTRAVENOUS EVERY 5 MIN PRN
Status: CANCELLED | OUTPATIENT
Start: 2021-08-10

## 2021-05-20 RX ORDER — ALBUTEROL SULFATE 0.83 MG/ML
2.5 SOLUTION RESPIRATORY (INHALATION)
Status: CANCELLED | OUTPATIENT
Start: 2021-08-10

## 2021-05-20 RX ORDER — SODIUM CHLORIDE 9 MG/ML
200 INJECTION, SOLUTION INTRAVENOUS CONTINUOUS PRN
Status: CANCELLED | OUTPATIENT
Start: 2021-06-15

## 2021-05-20 RX ORDER — ALBUTEROL SULFATE 90 UG/1
1-2 AEROSOL, METERED RESPIRATORY (INHALATION)
Status: CANCELLED
Start: 2021-06-15

## 2021-05-20 RX ORDER — SODIUM CHLORIDE 9 MG/ML
200 INJECTION, SOLUTION INTRAVENOUS CONTINUOUS PRN
Status: CANCELLED | OUTPATIENT
Start: 2021-08-10

## 2021-05-20 RX ORDER — DIPHENHYDRAMINE HYDROCHLORIDE 50 MG/ML
50 INJECTION INTRAMUSCULAR; INTRAVENOUS
Status: CANCELLED
Start: 2021-06-15

## 2021-05-20 RX ORDER — METHYLPREDNISOLONE SODIUM SUCCINATE 125 MG/2ML
125 INJECTION, POWDER, LYOPHILIZED, FOR SOLUTION INTRAMUSCULAR; INTRAVENOUS
Status: CANCELLED | OUTPATIENT
Start: 2021-07-13

## 2021-05-20 RX ORDER — EPINEPHRINE 1 MG/ML
0.3 INJECTION, SOLUTION, CONCENTRATE INTRAVENOUS EVERY 5 MIN PRN
Status: CANCELLED | OUTPATIENT
Start: 2021-07-13

## 2021-05-20 RX ORDER — METHYLPREDNISOLONE SODIUM SUCCINATE 125 MG/2ML
125 INJECTION, POWDER, LYOPHILIZED, FOR SOLUTION INTRAMUSCULAR; INTRAVENOUS
Status: CANCELLED | OUTPATIENT
Start: 2021-08-10

## 2021-05-20 RX ORDER — SODIUM CHLORIDE 9 MG/ML
200 INJECTION, SOLUTION INTRAVENOUS CONTINUOUS PRN
Status: CANCELLED | OUTPATIENT
Start: 2021-07-13

## 2021-05-20 RX ORDER — ALBUTEROL SULFATE 90 UG/1
1-2 AEROSOL, METERED RESPIRATORY (INHALATION)
Status: CANCELLED
Start: 2021-08-10

## 2021-05-21 LAB
6-TGN ENTSUB RBC: 849 PMOL/8X10(8)RBC (ref 235–450)
6MMP ENTSUB RBC: <438 PMOL/8X10(8)RBC

## 2021-05-25 DIAGNOSIS — Z11.59 ENCOUNTER FOR SCREENING FOR OTHER VIRAL DISEASES: ICD-10-CM

## 2021-06-14 ENCOUNTER — ANESTHESIA EVENT (OUTPATIENT)
Dept: PEDIATRICS | Facility: CLINIC | Age: 21
End: 2021-06-14
Payer: COMMERCIAL

## 2021-06-15 ENCOUNTER — ANESTHESIA (OUTPATIENT)
Dept: PEDIATRICS | Facility: CLINIC | Age: 21
End: 2021-06-15
Payer: COMMERCIAL

## 2021-06-15 ENCOUNTER — OFFICE VISIT (OUTPATIENT)
Dept: PEDIATRIC HEMATOLOGY/ONCOLOGY | Facility: CLINIC | Age: 21
End: 2021-06-15
Attending: NURSE PRACTITIONER
Payer: COMMERCIAL

## 2021-06-15 ENCOUNTER — INFUSION THERAPY VISIT (OUTPATIENT)
Dept: INFUSION THERAPY | Facility: CLINIC | Age: 21
End: 2021-06-15
Attending: NURSE PRACTITIONER
Payer: COMMERCIAL

## 2021-06-15 ENCOUNTER — HOSPITAL ENCOUNTER (OUTPATIENT)
Facility: CLINIC | Age: 21
Discharge: HOME | End: 2021-06-15
Attending: RADIOLOGY | Admitting: RADIOLOGY
Payer: COMMERCIAL

## 2021-06-15 VITALS
RESPIRATION RATE: 20 BRPM | HEART RATE: 71 BPM | SYSTOLIC BLOOD PRESSURE: 106 MMHG | OXYGEN SATURATION: 99 % | WEIGHT: 166.45 LBS | BODY MASS INDEX: 22.06 KG/M2 | DIASTOLIC BLOOD PRESSURE: 62 MMHG | HEIGHT: 73 IN | TEMPERATURE: 98.1 F

## 2021-06-15 DIAGNOSIS — C83.50 T LYMPHOBLASTIC LYMPHOMA (H): Primary | ICD-10-CM

## 2021-06-15 DIAGNOSIS — Z11.59 ENCOUNTER FOR SCREENING FOR OTHER VIRAL DISEASES: ICD-10-CM

## 2021-06-15 DIAGNOSIS — K85.31 DRUG-INDUCED ACUTE PANCREATITIS WITH UNINFECTED NECROSIS: ICD-10-CM

## 2021-06-15 LAB
ALBUMIN SERPL-MCNC: 4.1 G/DL (ref 3.4–5)
ALP SERPL-CCNC: 61 U/L (ref 40–150)
ALT SERPL W P-5'-P-CCNC: 20 U/L (ref 0–70)
ANION GAP SERPL CALCULATED.3IONS-SCNC: 5 MMOL/L (ref 3–14)
AST SERPL W P-5'-P-CCNC: 9 U/L (ref 0–45)
BASOPHILS # BLD AUTO: 0 10E9/L (ref 0–0.2)
BASOPHILS NFR BLD AUTO: 0.4 %
BILIRUB SERPL-MCNC: 0.5 MG/DL (ref 0.2–1.3)
BUN SERPL-MCNC: 17 MG/DL (ref 7–30)
CALCIUM SERPL-MCNC: 8.5 MG/DL (ref 8.5–10.1)
CHLORIDE SERPL-SCNC: 110 MMOL/L (ref 94–109)
CO2 SERPL-SCNC: 26 MMOL/L (ref 20–32)
CREAT SERPL-MCNC: 0.72 MG/DL (ref 0.66–1.25)
DIFFERENTIAL METHOD BLD: ABNORMAL
EOSINOPHIL # BLD AUTO: 0.1 10E9/L (ref 0–0.7)
EOSINOPHIL NFR BLD AUTO: 4.7 %
ERYTHROCYTE [DISTWIDTH] IN BLOOD BY AUTOMATED COUNT: 13.7 % (ref 10–15)
GFR SERPL CREATININE-BSD FRML MDRD: >90 ML/MIN/{1.73_M2}
GLUCOSE SERPL-MCNC: 88 MG/DL (ref 70–99)
HCT VFR BLD AUTO: 34.2 % (ref 40–53)
HGB BLD-MCNC: 12.2 G/DL (ref 13.3–17.7)
IMM GRANULOCYTES # BLD: 0 10E9/L (ref 0–0.4)
IMM GRANULOCYTES NFR BLD: 0 %
LABORATORY COMMENT REPORT: NORMAL
LYMPHOCYTES # BLD AUTO: 0.4 10E9/L (ref 0.8–5.3)
LYMPHOCYTES NFR BLD AUTO: 15.5 %
MCH RBC QN AUTO: 36 PG (ref 26.5–33)
MCHC RBC AUTO-ENTMCNC: 35.7 G/DL (ref 31.5–36.5)
MCV RBC AUTO: 101 FL (ref 78–100)
MONOCYTES # BLD AUTO: 0.2 10E9/L (ref 0–1.3)
MONOCYTES NFR BLD AUTO: 6.5 %
NEUTROPHILS # BLD AUTO: 2 10E9/L (ref 1.6–8.3)
NEUTROPHILS NFR BLD AUTO: 72.9 %
NRBC # BLD AUTO: 0 10*3/UL
NRBC BLD AUTO-RTO: 0 /100
PLATELET # BLD AUTO: 235 10E9/L (ref 150–450)
POTASSIUM SERPL-SCNC: 4.1 MMOL/L (ref 3.4–5.3)
PROT SERPL-MCNC: 6.6 G/DL (ref 6.8–8.8)
RBC # BLD AUTO: 3.39 10E12/L (ref 4.4–5.9)
SARS-COV-2 RNA RESP QL NAA+PROBE: NEGATIVE
SODIUM SERPL-SCNC: 141 MMOL/L (ref 133–144)
SPECIMEN SOURCE: NORMAL
WBC # BLD AUTO: 2.8 10E9/L (ref 4–11)

## 2021-06-15 PROCEDURE — 250N000011 HC RX IP 250 OP 636: Performed by: NURSE ANESTHETIST, CERTIFIED REGISTERED

## 2021-06-15 PROCEDURE — 96409 CHEMO IV PUSH SNGL DRUG: CPT

## 2021-06-15 PROCEDURE — 250N000011 HC RX IP 250 OP 636: Performed by: NURSE PRACTITIONER

## 2021-06-15 PROCEDURE — 89050 BODY FLUID CELL COUNT: CPT | Performed by: NURSE PRACTITIONER

## 2021-06-15 PROCEDURE — 80299 QUANTITATIVE ASSAY DRUG: CPT | Performed by: NURSE PRACTITIONER

## 2021-06-15 PROCEDURE — 250N000011 HC RX IP 250 OP 636

## 2021-06-15 PROCEDURE — 87635 SARS-COV-2 COVID-19 AMP PRB: CPT | Performed by: NURSE PRACTITIONER

## 2021-06-15 PROCEDURE — 85025 COMPLETE CBC W/AUTO DIFF WBC: CPT | Performed by: NURSE PRACTITIONER

## 2021-06-15 PROCEDURE — 370N000017 HC ANESTHESIA TECHNICAL FEE, PER MIN: Performed by: NURSE PRACTITIONER

## 2021-06-15 PROCEDURE — 99215 OFFICE O/P EST HI 40 MIN: CPT | Mod: 25 | Performed by: NURSE PRACTITIONER

## 2021-06-15 PROCEDURE — 999N000141 HC STATISTIC PRE-PROCEDURE NURSING ASSESSMENT: Performed by: NURSE PRACTITIONER

## 2021-06-15 PROCEDURE — 250N000009 HC RX 250

## 2021-06-15 PROCEDURE — 258N000003 HC RX IP 258 OP 636: Performed by: NURSE ANESTHETIST, CERTIFIED REGISTERED

## 2021-06-15 PROCEDURE — G0463 HOSPITAL OUTPT CLINIC VISIT: HCPCS | Mod: 25 | Performed by: NURSE PRACTITIONER

## 2021-06-15 PROCEDURE — 999N000131 HC STATISTIC POST-PROCEDURE RECOVERY CARE: Performed by: NURSE PRACTITIONER

## 2021-06-15 PROCEDURE — 96450 CHEMOTHERAPY INTO CNS: CPT | Performed by: NURSE PRACTITIONER

## 2021-06-15 PROCEDURE — 258N000003 HC RX IP 258 OP 636: Performed by: NURSE PRACTITIONER

## 2021-06-15 PROCEDURE — 80053 COMPREHEN METABOLIC PANEL: CPT | Performed by: NURSE PRACTITIONER

## 2021-06-15 RX ORDER — FENTANYL CITRATE 50 UG/ML
INJECTION, SOLUTION INTRAMUSCULAR; INTRAVENOUS PRN
Status: DISCONTINUED | OUTPATIENT
Start: 2021-06-15 | End: 2021-06-15

## 2021-06-15 RX ORDER — ONDANSETRON 4 MG/1
4 TABLET, ORALLY DISINTEGRATING ORAL EVERY 30 MIN PRN
Status: CANCELLED | OUTPATIENT
Start: 2021-06-15

## 2021-06-15 RX ORDER — PHYSOSTIGMINE SALICYLATE 1 MG/ML
1.2 INJECTION INTRAVENOUS
Status: CANCELLED | OUTPATIENT
Start: 2021-06-15

## 2021-06-15 RX ORDER — HEPARIN SODIUM (PORCINE) LOCK FLUSH IV SOLN 100 UNIT/ML 100 UNIT/ML
3-5 SOLUTION INTRAVENOUS
Status: CANCELLED | OUTPATIENT
Start: 2021-06-15

## 2021-06-15 RX ORDER — HEPARIN SODIUM,PORCINE 10 UNIT/ML
VIAL (ML) INTRAVENOUS
Status: COMPLETED
Start: 2021-06-15 | End: 2021-06-15

## 2021-06-15 RX ORDER — PREDNISONE 10 MG/1
TABLET ORAL
Qty: 40 TABLET | Refills: 2 | Status: SHIPPED | OUTPATIENT
Start: 2021-06-15 | End: 2021-10-05

## 2021-06-15 RX ORDER — FENTANYL CITRATE 50 UG/ML
25-50 INJECTION, SOLUTION INTRAMUSCULAR; INTRAVENOUS EVERY 5 MIN PRN
Status: CANCELLED | OUTPATIENT
Start: 2021-06-15

## 2021-06-15 RX ORDER — SODIUM CHLORIDE, SODIUM LACTATE, POTASSIUM CHLORIDE, CALCIUM CHLORIDE 600; 310; 30; 20 MG/100ML; MG/100ML; MG/100ML; MG/100ML
INJECTION, SOLUTION INTRAVENOUS CONTINUOUS PRN
Status: DISCONTINUED | OUTPATIENT
Start: 2021-06-15 | End: 2021-06-15

## 2021-06-15 RX ORDER — HEPARIN SODIUM,PORCINE 10 UNIT/ML
5 VIAL (ML) INTRAVENOUS ONCE
Status: DISCONTINUED | OUTPATIENT
Start: 2021-06-15 | End: 2021-06-15 | Stop reason: HOSPADM

## 2021-06-15 RX ORDER — LIDOCAINE 40 MG/G
CREAM TOPICAL
Status: CANCELLED | OUTPATIENT
Start: 2021-06-15

## 2021-06-15 RX ORDER — LIDOCAINE 40 MG/G
CREAM TOPICAL
Status: COMPLETED
Start: 2021-06-15 | End: 2021-06-15

## 2021-06-15 RX ORDER — PANTOPRAZOLE SODIUM 40 MG/1
40 TABLET, DELAYED RELEASE ORAL
Qty: 30 TABLET | Refills: 2 | Status: SHIPPED | OUTPATIENT
Start: 2021-06-15 | End: 2022-01-07

## 2021-06-15 RX ORDER — ALBUTEROL SULFATE 0.83 MG/ML
2.5 SOLUTION RESPIRATORY (INHALATION) EVERY 4 HOURS PRN
Status: CANCELLED | OUTPATIENT
Start: 2021-06-15

## 2021-06-15 RX ORDER — MEPERIDINE HYDROCHLORIDE 25 MG/ML
12.5 INJECTION INTRAMUSCULAR; INTRAVENOUS; SUBCUTANEOUS
Status: CANCELLED | OUTPATIENT
Start: 2021-06-15

## 2021-06-15 RX ORDER — ONDANSETRON 2 MG/ML
4 INJECTION INTRAMUSCULAR; INTRAVENOUS EVERY 30 MIN PRN
Status: CANCELLED | OUTPATIENT
Start: 2021-06-15

## 2021-06-15 RX ORDER — LIDOCAINE 40 MG/G
CREAM TOPICAL
Status: COMPLETED | OUTPATIENT
Start: 2021-06-15 | End: 2021-06-15

## 2021-06-15 RX ORDER — NALOXONE HYDROCHLORIDE 0.4 MG/ML
0.2 INJECTION, SOLUTION INTRAMUSCULAR; INTRAVENOUS; SUBCUTANEOUS
Status: CANCELLED | OUTPATIENT
Start: 2021-06-15 | End: 2021-06-16

## 2021-06-15 RX ORDER — ONDANSETRON 2 MG/ML
INJECTION INTRAMUSCULAR; INTRAVENOUS PRN
Status: DISCONTINUED | OUTPATIENT
Start: 2021-06-15 | End: 2021-06-15

## 2021-06-15 RX ORDER — HEPARIN SODIUM,PORCINE 10 UNIT/ML
3-6 VIAL (ML) INTRAVENOUS EVERY 24 HOURS
Status: DISCONTINUED | OUTPATIENT
Start: 2021-06-15 | End: 2021-06-15 | Stop reason: HOSPADM

## 2021-06-15 RX ORDER — SODIUM CHLORIDE, SODIUM LACTATE, POTASSIUM CHLORIDE, CALCIUM CHLORIDE 600; 310; 30; 20 MG/100ML; MG/100ML; MG/100ML; MG/100ML
INJECTION, SOLUTION INTRAVENOUS CONTINUOUS
Status: CANCELLED | OUTPATIENT
Start: 2021-06-15

## 2021-06-15 RX ORDER — HEPARIN SODIUM (PORCINE) LOCK FLUSH IV SOLN 100 UNIT/ML 100 UNIT/ML
SOLUTION INTRAVENOUS
Status: COMPLETED
Start: 2021-06-15 | End: 2021-06-15

## 2021-06-15 RX ORDER — NALOXONE HYDROCHLORIDE 0.4 MG/ML
0.4 INJECTION, SOLUTION INTRAMUSCULAR; INTRAVENOUS; SUBCUTANEOUS
Status: CANCELLED | OUTPATIENT
Start: 2021-06-15 | End: 2021-06-16

## 2021-06-15 RX ORDER — MERCAPTOPURINE 50 MG/1
TABLET ORAL
Qty: 45 TABLET | Refills: 2 | Status: SHIPPED | OUTPATIENT
Start: 2021-06-15 | End: 2021-10-05

## 2021-06-15 RX ORDER — FENTANYL CITRATE 50 UG/ML
25-50 INJECTION, SOLUTION INTRAMUSCULAR; INTRAVENOUS
Status: CANCELLED | OUTPATIENT
Start: 2021-06-15

## 2021-06-15 RX ADMIN — MIDAZOLAM 1 MG: 1 INJECTION INTRAMUSCULAR; INTRAVENOUS at 10:10

## 2021-06-15 RX ADMIN — METHOTREXATE: 25 INJECTION INTRA-ARTERIAL; INTRAMUSCULAR; INTRATHECAL; INTRAVENOUS at 10:00

## 2021-06-15 RX ADMIN — Medication 5 ML: at 09:11

## 2021-06-15 RX ADMIN — ONDANSETRON 4 MG: 2 INJECTION INTRAMUSCULAR; INTRAVENOUS at 10:10

## 2021-06-15 RX ADMIN — MIDAZOLAM 1 MG: 1 INJECTION INTRAMUSCULAR; INTRAVENOUS at 10:00

## 2021-06-15 RX ADMIN — SODIUM CHLORIDE, SODIUM LACTATE, POTASSIUM CHLORIDE, CALCIUM CHLORIDE: 600; 310; 30; 20 INJECTION, SOLUTION INTRAVENOUS at 09:57

## 2021-06-15 RX ADMIN — FENTANYL CITRATE 50 MCG: 50 INJECTION, SOLUTION INTRAMUSCULAR; INTRAVENOUS at 10:00

## 2021-06-15 RX ADMIN — HEPARIN, PORCINE (PF) 10 UNIT/ML INTRAVENOUS SYRINGE 5 ML: at 09:11

## 2021-06-15 RX ADMIN — SODIUM CHLORIDE 50 ML: 9 INJECTION, SOLUTION INTRAVENOUS at 08:59

## 2021-06-15 RX ADMIN — MIDAZOLAM 2 MG: 1 INJECTION INTRAMUSCULAR; INTRAVENOUS at 09:54

## 2021-06-15 RX ADMIN — HEPARIN 500 UNITS: 100 SYRINGE at 11:15

## 2021-06-15 RX ADMIN — VINCRISTINE SULFATE 2 MG: 1 INJECTION, SOLUTION INTRAVENOUS at 09:00

## 2021-06-15 RX ADMIN — LIDOCAINE: 40 CREAM TOPICAL at 09:20

## 2021-06-15 ASSESSMENT — MIFFLIN-ST. JEOR: SCORE: 1796.87

## 2021-06-15 NOTE — DISCHARGE INSTRUCTIONS
Home Instructions for Your Child after Sedation  Today your child received (medicine):  Fentanyl and Versed  Please keep this form with your health records  Your child may be more sleepy and irritable today than normal. Also, an adult should stay with your child for the rest of the day. The medicine may make the child dizzy. Avoid activities that require balance (bike riding, skating, climbing stairs, walking).  Remember:    When your child wants to eat again, start with liquids (juice, soda pop, Popsicles). If your child feels well enough, you may try a regular diet. It is best to offer light meals for the first 24 hours.    If your child has nausea (feels sick to the stomach) or vomiting (throws up), give small amounts of clear liquids (7-Up, Sprite, apple juice or broth). Fluids are more important than food until your child is feeling better.    Wait 24 hours before giving medicine that contains alcohol. This includes liquid cold, cough and allergy medicines (Robitussin, Vicks Formula 44 for children, Benadryl, Chlor-Trimeton).    If you will leave your child with a , give the sitter a copy of these instructions.  Call your doctor if:    You have questions about the test results.    Your child vomits (throws up) more than two times.    Your child is very fussy or irritable.    You have trouble waking your child.     If your child has trouble breathing, call 911.  If you have any questions or concerns, please call:  Pediatric Sedation Unit [phone]  Pediatric clinic  [phone]  [facility]  [phone] (ask for the anesthesiologist on call)  Emergency department [phone]  [facility] toll-free number [phone] (Monday--Friday, 8 a.m. to 4:30 p.m.)  I understand these instructions. I have all of my personal belongings.      [facility]   [phone]    Care post Lumbar Puncture     Do not remove bandage/dressing for 24 hours -- after this time they can be removed    No  bath, shower or soaking of the dressing for 24 hours    Activity as tolerated by the patient    Diet as able to tolerated    May use Tylenol as needed for pain control -- DO NOT use Ibuprofen    Can apply icepack to the site for discomfort -- no more than 10 minutes at a time    If bleeding presents apply pressure for 5 minutes    Call [phone] ask for Peds BMT/Hem/Onc fellow on call if complications arise including:    persistent bleeding    fever greater than 100.5    Pain    Lumbar punctures can cause headache. If the pain is not controlled with Tylenol (acetaminophen) please call the Peds BMT/Hem/Onc fellow on call

## 2021-06-15 NOTE — PROGRESS NOTES
Infusion Nursing Note    Lazaro PLUNKETT Kalerobbin Presents to [facility] today for: Vincristine    Due to : T lymphoblastic lymphoma (H)    Intravenous Access/Labs: Port accessed by Peds Sedation and labs already drawn in Peds Sedation. + blood return noted.    Infusion Note: Pt. Seen and assessed by Luana AYALA NP, okay for treatment today.  Vincristine given by gravity; + blood return noted pre/mid/post infusion. Port heparin locked and left accessed for Peds Sedation. Stable pt left clinic to go to Peds Sedation for procedure.

## 2021-06-15 NOTE — ANESTHESIA POSTPROCEDURE EVALUATION
Patient: Lazaro Lund    Procedure(s):  Lumbar puncture with intrathecal Chemotherapy (not CD)    Diagnosis:Lymphoma (H) [C85.90]  Diagnosis Additional Information: No value filed.    Anesthesia Type:  MAC    Note:  Disposition: Outpatient   Postop Pain Control: Uneventful            Sign Out: Well controlled pain   PONV: No   Neuro/Psych: Uneventful            Sign Out: Acceptable/Baseline neuro status   Airway/Respiratory: Uneventful            Sign Out: Acceptable/Baseline resp. status   CV/Hemodynamics: Uneventful            Sign Out: Acceptable CV status; No obvious hypovolemia; No obvious fluid overload   Other NRE: NONE   DID A NON-ROUTINE EVENT OCCUR? No           Last vitals:  Vitals:    06/15/21 1045 06/15/21 1100 06/15/21 1115   BP: 96/45 99/48 106/62   Pulse: 57 56 71   Resp:  13 20   Temp:   36.7  C (98.1  F)   SpO2: 99% 100% 99%       Last vitals prior to Anesthesia Care Transfer:  CRNA VITALS  6/15/2021 0946 - 6/15/2021 1046      6/15/2021             Temp:  36.4  C (97.5  F)    SpO2:  100 %    EKG:  Sinus rhythm          Electronically Signed By: Henok Mike MD  Juanita 15, 2021  11:41 AM

## 2021-06-15 NOTE — LETTER
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 Lazaro presented with acute onset cough and was found to have an anterior mediastinal mass and malignant left-sided pleural effusion.  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  He was admitted to [facility] oncology service 8/30 and started on treatment per [id number].  He had a pleural effusion that required a chest tube and a pericardial effusion that was not drained. His Induction was complicated by cardiac compression secondary to his mass leading to tamponade, this improved through out his hospital stay.  He also had dysphagia that improved with treatment of his mass, swallow study was normal. His course was complicated by extensive bilateral UE DVTs, and he was successfully treated with anticoagulation. His consolidation course was complicated by the development of severe asparaginase induced necrotizing pancreatitis. He became quite ill with SIRS and associated hypotension, he required pressor support in the ICU. As a result, asparaginase was eliminated from his treatment plan. He was in CR status at end of consolidation. During maintenance, he developed hepatotoxicity so allopurinol and dose reduced 6MP were started for correction of skewed 6-MP metabolism. Lazaro comes to clinic today for Day 1 of his 6th Maintenance cycle.     HPI:   Since his last visit Lazaro has been doing very well.  His only concern is new canker sores.  One seems to resolve and then another one starts.  They are not interfering with his ability to eat or drink but are uncomfortable at times.  He has not had any lesions on his lips.    Lazaro's energy level is strong.  He is planning to quit his job after finding out new hires are making more money than he is.  He  plans to get another job but ultimately wants to be a union .      Lazaro denies GI upset.  Passing soft stool, no constipation.  Some mild bumps on his arms, not bothersome to him.  No pain.  No paresthesias or gait changes. Denies fever or respiratory symptoms. He hasn't gotten the COVID vaccine yet. Continues to be interested in this.     Review of systems:  The 10 point Review of Systems is negative other than noted in the HPI or here.      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 Former daily marajuana smoker, now only uses occasionally    PFMH:   Family History   Problem Relation Age of Onset     No Known Problems Mother      No Known Problems Father      Asthma Brother      Thyroid Cancer Paternal Grandmother      Melanoma Paternal Aunt        Social History:   Social History     Social History Narrative    Lazaro previously lived at home with his dad and step mom in [place] but is now staying with his grandma in [place]. Biological mother is involved in his life. Has 4 step-siblings and 1 biological sibling. 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 Has completed high school. 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  He has  been enjoying [employer] and [employer].        Current Medications:  Current Outpatient Medications on File Prior to Visit   Medication Sig Dispense Refill     allopurinol (ZYLOPRIM) 100 MG tablet Take 1 tablet (100 mg) by mouth daily 30 tablet 11     diphenhydrAMINE (BENADRYL) 25 MG capsule Take 1-2 capsules (25-50 mg) by mouth every 6 hours as needed (Breakthrough Nausea and Vomiting ) 30 capsule 1     lidocaine-prilocaine (EMLA) 2.5-2.5 % external cream Apply topically as needed for other (prior to port access) 30 g 6     mercaptopurine (PURINETHOL) 50 MG tablet Take 50mg (1 tablet) three days/week and 75mg (1.5 tablets) four days/week. 40 tablet 2     methotrexate 2.5 MG tablet Take 16 tablets (40 mg) by mouth once a week Do not take on Days of LP. 64 tablet 2     ondansetron (ZOFRAN-ODT) 8 MG ODT tab Take 1 tablet (8 mg) by mouth every 8 hours as needed for nausea 20 tablet 6     pantoprazole (PROTONIX) 40 MG EC tablet Take 1 tablet (40 mg) by mouth every morning (before breakfast) During weeks of taking prednisone. 30 tablet 2     polyethylene glycol (MIRALAX/GLYCOLAX) packet Take 17 g by mouth daily 30 packet 0     predniSONE (DELTASONE) 10 MG tablet Take 40mg (4 tabs) twice dialy for 5 days every 4 weeks. 40 tablet 2     sennosides (SENOKOT) 8.6 MG tablet Take 2 tablets by mouth 2 times daily as needed for constipation 60 each 1     sulfamethoxazole-trimethoprim (BACTRIM DS) 800-160 MG tablet Take 1 tablet by mouth Every Mon, Tues two times daily 16 tablet 11     Vitamin D, Cholecalciferol, 25 MCG (1000 UT) TABS Take 1 tablet (1,000 Units) by mouth daily 30 tablet 3     Reviewed medications with Lazaro. Confirmed oral chemo doses, and notes only missing 1 dose of 6MP. He is not taking Vitamin D currently.  Received [id number] influenza vaccine on 12/22/20     Physical Exam:   Temp:  [98.3  F (36.8  C)] 98.3  F (36.8  C)  Pulse:  [74] 74  Resp:  [16] 16  BP: (117)/(69) 117/69  Cuff Mean (mmHg):  [81] 81  SpO2:   "[98 %] 98 %  Wt Readings from Last 4 Encounters:   06/15/21 75.5 kg (166 lb 7.2 oz)   05/18/21 77.5 kg (170 lb 13.7 oz)   04/20/21 77.7 kg (171 lb 4.8 oz)   03/23/21 76.3 kg (168 lb 3.4 oz)     Ht Readings from Last 2 Encounters:   06/15/21 1.843 m (6' 0.56\")   05/18/21 1.844 m (6' 0.6\")     General: Lazaro is alert, interactive and appropriate, well-appearing, in no distress  HEENT: Skull is atrauamatic and normocephalic.  Full head of hair. PERRLA, sclera are non icteric and not injected, EOM are intact. Nares are patent without drainage. Oropharynx clear, no plaques noted. Right buccal mucosa with one ulceration posteriorly. MMM.  Neck:  Supple without lymphadenopathy.   Lymph: There is no cervical, supraclavicular, axillary, lymphadenopathy palpated.  Cardiovascular:  HR is regular, S1, S2 no murmur.  Capillary refill is < 2 seconds.   Respiratory: No cough noted. Respirations are easy. Lungs are clear to auscultation throughout.  No crackles or wheezes.  Gastrointestinal: BS present in all quadrants.  Abdomen is soft and flat.  No pain with palpation. No hepatosplenomegaly or masses are palpated.  Skin: No concerning lesions.  Few scattered erythematous papules on his arms.  Neurological:  CN 2-12 grossly intact. Gait is normal.  No issues with balance. Sensation intact in hands and feet.     Musculoskeletal: Good strength and ROM in all extremities.  Strong dorsiflexion at ankles and great toes (5/5) bilaterally without any pain at the Achilles.     Labs:   Results for orders placed or performed during the hospital encounter of 06/15/21   CBC with platelets differential     Status: Abnormal   Result Value Ref Range    WBC 2.8 (L) 4.0 - 11.0 10e9/L    RBC Count 3.39 (L) 4.4 - 5.9 10e12/L    Hemoglobin 12.2 (L) 13.3 - 17.7 g/dL    Hematocrit 34.2 (L) 40.0 - 53.0 %     (H) 78 - 100 fl    MCH 36.0 (H) 26.5 - 33.0 pg    MCHC 35.7 31.5 - 36.5 g/dL    RDW 13.7 10.0 - 15.0 %    Platelet Count 235 150 - 450 " 10e9/L    Diff Method Automated Method     % Neutrophils 72.9 %    % Lymphocytes 15.5 %    % Monocytes 6.5 %    % Eosinophils 4.7 %    % Basophils 0.4 %    % Immature Granulocytes 0.0 %    Nucleated RBCs 0 0 /100    Absolute Neutrophil 2.0 1.6 - 8.3 10e9/L    Absolute Lymphocytes 0.4 (L) 0.8 - 5.3 10e9/L    Absolute Monocytes 0.2 0.0 - 1.3 10e9/L    Absolute Eosinophils 0.1 0.0 - 0.7 10e9/L    Absolute Basophils 0.0 0.0 - 0.2 10e9/L    Abs Immature Granulocytes 0.0 0 - 0.4 10e9/L    Absolute Nucleated RBC 0.0    Comprehensive metabolic panel     Status: Abnormal   Result Value Ref Range    Sodium 141 133 - 144 mmol/L    Potassium 4.1 3.4 - 5.3 mmol/L    Chloride 110 (H) 94 - 109 mmol/L    Carbon Dioxide 26 20 - 32 mmol/L    Anion Gap 5 3 - 14 mmol/L    Glucose 88 70 - 99 mg/dL    Urea Nitrogen 17 7 - 30 mg/dL    Creatinine 0.72 0.66 - 1.25 mg/dL    GFR Estimate >90 >60 mL/min/[1.73_m2]    GFR Estimate If Black >90 >60 mL/min/[1.73_m2]    Calcium 8.5 8.5 - 10.1 mg/dL    Bilirubin Total 0.5 0.2 - 1.3 mg/dL    Albumin 4.1 3.4 - 5.0 g/dL    Protein Total 6.6 (L) 6.8 - 8.8 g/dL    Alkaline Phosphatase 61 40 - 150 U/L    ALT 20 0 - 70 U/L    AST 9 0 - 45 U/L     A Lumbar Puncture was performed in the Pediatric Sedation Suite. Informed consent was obtained prior to the procedure. Lazaro Lund was identified by facial recognition and ID arm band. A time-out was performed. Lazaro Lund was then placed in the left lateral decubitus position and the lumbosacral area was sterily prepped using Chloraprep followed by drape placement. Anatomic landmarks were identified by palpation. Then, a 22 gauge, 2.5 inch Quincke spinal needle was inserted just through the skin.  That needle was removed and a 3.5 inch Slava needle was easily inserted into the L3/L4 interspace. On the first attempt approximately 3 mL of clear and colorless cerebrospinal fluid was obtained to be sent to the lab for cell count analysis and cytospin.  Following that, 15mg of intrathecal methotrexate in 6 mL of preservative-free normal saline was infused without resistance. The needle was removed and a Band-Aid applied. Lazaro Lund tolerated this procedure very well.      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  He is being treated per COG protocol [id number]. He's had a CRu following Induction with a CR at the end of Consolidation. His course was complicated by extensive bilateral DVT and severe necrotizing pancreatitis requiring cessation of PEG-asparaginase from his treatment.  He comes to clinic today for Day 1 of Cycle 6 of Maintenance therapy.  He is doing very well overall.  Mild mucositis likely secondary to chemotherapy.  He is currently on 43% full dose of 6MP with allopurinol for correction of skewed 6-MP metabolism and 100% methotrexate.  His ANC is above target range for the second month in a row.  Mild folliculitis.     Plan:   1) Labs reviewed with Lazaro. Will increase 6MP to 50%= 75mg daily, continue methotrexate at 100% full dose= 16 tabs.  Reminded Lazaro to hold oral methotrexate this week since he is having a lumbar puncture.  2) Start prednisone burst today, continue protonix during bursts.  3) IV vincristine in clinic today.  4) Most recent Vit D level was normal however Lazaro should continue on Vit D 1000IU daily.  5) Bactrim for PCP prophylaxis.   6) Continue to check TGNs monthly moving forward.   7) Lazaro will need COVID testing prior to LPs moving forward.  He prefers to come the day of, have it done in sedation and then come to clinic for chemo while he is waiting for results.   8) Discussed COVID vaccine in detail.  Lazaro is willing to get vaccinated.  Discussed different vaccines and that he can schedule it through [facility].  I would like him to avoid vaccination during his prednisone burst or for the week after in order to maximize his immune response.    9) RTC in 4 weeks for Day 29 of cycle 6.    Félix Van CNP    Total time spent on the following services on the date of the encounter: 50 minutes  Preparing to see patient with chart review    Ordering medications, labs tests, chemotherapy   Communicating with other healthcare professionals and care coordination  Interpretation of labs  Performing a medically appropriate examination   Counseling and educating the patient/family/caregiver   Communicating results to the patient/family/caregiver   Documenting clinical information in the electronic health record         YOHAN Dunn CNP

## 2021-06-15 NOTE — ANESTHESIA CARE TRANSFER NOTE
Patient: Lazaro Lund    Procedure(s):  Lumbar puncture with intrathecal Chemotherapy (not CD)    Diagnosis: Lymphoma (H) [C85.90]  Diagnosis Additional Information: No value filed.    Anesthesia Type:   MAC     Note:    Oropharynx: oropharynx clear of all foreign objects and spontaneously breathing  Level of Consciousness: awake  Oxygen Supplementation: nasal cannula  Level of Supplemental Oxygen (L/min / FiO2): 2  Independent Airway: airway patency satisfactory and stable  Dentition: dentition unchanged  Vital Signs Stable: post-procedure vital signs reviewed and stable  Report to RN Given: handoff report given  Patient transferred to:  Recovery    Handoff Report: Identifed the Patient, Identified the Reponsible Provider, Reviewed the pertinent medical history, Discussed the surgical course, Reviewed Intra-OP anesthesia mangement and issues during anesthesia, Set expectations for post-procedure period and Allowed opportunity for questions and acknowledgement of understanding      Vitals: (Last set prior to Anesthesia Care Transfer)  CRNA VITALS  6/15/2021 0946 - 6/15/2021 1026      6/15/2021             Temp:  36.4  C (97.5  F)    SpO2:  100 %    EKG:  Sinus rhythm        Electronically Signed By: YOHAN Ramos CRNA  Juanita 15, 2021  10:26 AM

## 2021-06-15 NOTE — ANESTHESIA PREPROCEDURE EVALUATION
Anesthesia Pre-Procedure Evaluation    Patient: Lazaro Lund   MRN: [id number] : 2000        Preoperative Diagnosis: Lymphoma (H) [C85.90]   Procedure : Procedure(s):  Lumbar puncture with intrathecal Chemotherapy (not CD)     Past Medical History:   Diagnosis Date     Acute necrotizing pancreatitis 2019    attributed to asparaginase     Acute pancreatitis due to PEGaspariginase therapy  11/15/2019     DVT of upper extremity (deep vein thrombosis) (H) 2019    Bilateral      Edema of upper extremity 10/17/2019     Folliculitis 10/17/2019     Migraine 2006    have resolved     T lymphoblastic lymphoma (H) 2019      Past Surgical History:   Procedure Laterality Date     BONE MARROW BIOPSY, BONE SPECIMEN, NEEDLE/TROCAR Left 2019    Procedure: BIOPSY, BONE MARROW;  Surgeon: Heather Lopez MD;  [facility]     INSERT PICC LINE N/A 2019    Procedure: INSERTION, PICC;  Surgeon: Michell Keith MD;  [facility]     INSERT PORT VASCULAR ACCESS N/A 10/24/2019    Procedure: INSERTION, VASCULAR ACCESS PORT;  Surgeon: Silviano Martins MD;  [facility]      IR CHEST PORT PLACEMENT > 5 YRS OF AGE  10/24/2019     IR CHEST TUBE PLACEMENT NON-TUNNELLED LEFT  2019     IR PICC PLACEMENT > 5 YRS OF AGE  2019     IR PORT CHECK RIGHT  2019     SPINAL PUNCTURE,LUMBAR, INTRATHECAL CHEMO DELIVERY N/A 2019    Procedure: LUMBAR PUNCTURE, WITH INTRATHECAL CHEMOTHERAPY ADMINISTRATION;  Surgeon: Heather Lopez MD;  [facility]     SPINAL PUNCTURE,LUMBAR, INTRATHECAL CHEMO DELIVERY N/A 2019    Procedure: Lumbar Puncture With Intrathecal Chemo;  Surgeon: Alexi Hayes MD;  [facility]     SPINAL PUNCTURE,LUMBAR, INTRATHECAL CHEMO DELIVERY N/A 10/10/2019    Procedure: Lumbar puncture with IT Chemo (CD);  Surgeon: Reynaldo Campuzano MD;  [facility]      SPINAL PUNCTURE,LUMBAR, INTRATHECAL CHEMO DELIVERY N/A  10/17/2019    Procedure: Lumbar puncture with IT Chemo (not CD);  Surgeon: Reynaldo Capmuzano MD;  [facility]      SPINAL PUNCTURE,LUMBAR, INTRATHECAL CHEMO DELIVERY N/A 10/24/2019    Procedure: LUMBAR PUNCTURE, WITH INTRATHECAL CHEMOTHERAPY ADMINISTRATION;  Surgeon: Félix Van APRN CNP;  [facility]      SPINAL PUNCTURE,LUMBAR, INTRATHECAL CHEMO DELIVERY N/A 10/31/2019    Procedure: Lumbar puncture with IT Chemo (not CD);  Surgeon: Reynaldo Campuzano MD;  [facility]      SPINAL PUNCTURE,LUMBAR, INTRATHECAL CHEMO DELIVERY N/A 12/19/2019    Procedure: Lumbar puncture with IT Chem (CD);  Surgeon: Félix Van APRN CNP;  [facility]      SPINAL PUNCTURE,LUMBAR, INTRATHECAL CHEMO DELIVERY N/A 12/23/2019    Procedure: Lumbar puncture with IT Chem (CD);  Surgeon: Heather Lopez MD;  [facility]      SPINAL PUNCTURE,LUMBAR, INTRATHECAL CHEMO DELIVERY N/A 1/23/2020    Procedure: Lumbar puncture with IT Chem (CD);  Surgeon: Reynaldo Campuzano MD;  [facility]      SPINAL PUNCTURE,LUMBAR, INTRATHECAL CHEMO DELIVERY N/A 2/20/2020    Procedure: Lumbar puncture with intrathecal Chemotherapy (CD);  Surgeon: Reynaldo Campuzano MD;  [facility]      SPINAL PUNCTURE,LUMBAR, INTRATHECAL CHEMO DELIVERY N/A 3/19/2020    Procedure: Lumbar puncture with intrathecal Chemotherapy (CD);  Surgeon: Reynaldo Campuzano MD;  [facility]      SPINAL PUNCTURE,LUMBAR, INTRATHECAL CHEMO DELIVERY N/A 3/26/2020    Procedure: Lumbar puncture with intrathecal Chemotherapy (not CD);  Surgeon: Todd Mercado MD;  [facility]      SPINAL PUNCTURE,LUMBAR, INTRATHECAL CHEMO DELIVERY N/A 4/23/2020    Procedure: Lumbar puncture with intrathecal Chemotherapy (CD);  Surgeon: Marleny Brewer MD;  [facility]      SPINAL PUNCTURE,LUMBAR, INTRATHECAL CHEMO  DELIVERY N/A 5/14/2020    Procedure: Lumbar puncture with intrathecal Chemotherapy (not CD);  Surgeon: Todd Mercado MD;  [facility]      SPINAL PUNCTURE,LUMBAR, INTRATHECAL CHEMO DELIVERY N/A 7/9/2020    Procedure: Lumbar puncture with intrathecal Chemotherapy (CD);  Surgeon: Todd Mercado MD;  [facility]      SPINAL PUNCTURE,LUMBAR, INTRATHECAL CHEMO DELIVERY N/A 8/6/2020    Procedure: Lumbar puncture with intrathecal Chemotherapy (not CD);  Surgeon: Todd Mercado MD;  [facility]      SPINAL PUNCTURE,LUMBAR, INTRATHECAL CHEMO DELIVERY N/A 10/6/2020    Procedure: Lumbar puncture with intrathecal Chemotherapy (not CD);  Surgeon: Félix Van APRN CNP;  [facility]      SPINAL PUNCTURE,LUMBAR, INTRATHECAL CHEMO DELIVERY N/A 11/3/2020    Procedure: Lumbar puncture with intrathecal Chemotherapy (not CD);  Surgeon: Reynaldo Campuzano MD;  [facility]      SPINAL PUNCTURE,LUMBAR, INTRATHECAL CHEMO DELIVERY N/A 12/29/2020    Procedure: Lumbar puncture with intrathecal Chemotherapy (CD);  Surgeon: Reynaldo Campuzano MD;  [facility]      SPINAL PUNCTURE,LUMBAR, INTRATHECAL CHEMO DELIVERY N/A 2/23/2021    Procedure: Lumbar puncture with intrathecal Chemotherapy (not CD);  Surgeon: Félix Van APRN CNP;  [facility]      SPINAL PUNCTURE,LUMBAR, INTRATHECAL CHEMO DELIVERY N/A 3/23/2021    Procedure: Lumbar puncture with intrathecal Chemotherapy (not CD);  Surgeon: Marie Damon NP;  [facility]      SPINAL PUNCTURE,LUMBAR, INTRATHECAL CHEMO DELIVERY N/A 4/20/2021    Procedure: Lumbar puncture with intrathecal Chemotherapy (not CD);  Surgeon: Marie Damon NP;  [facility]      THORACENTESIS N/A 8/31/2019    Procedure: Thoracentesis;  Surgeon: Michell Keith MD;  [facility]      Allergies   Allergen Reactions     Asparaginase  Derivatives Other (See Comments)     Severe pancreatitis     No Known Drug Allergies       Social History     Tobacco Use     Smoking status: Light Tobacco Smoker     Packs/day: 0.00     Types: Cigarettes     Smokeless tobacco: Never Used   Substance Use Topics     Alcohol use: Yes      Wt Readings from Last 1 Encounters:   06/15/21 75.5 kg (166 lb 7.2 oz)        Anesthesia Evaluation   Pt has had prior anesthetic. Type: General and MAC.    No history of anesthetic complications       ROS/MED HX  ENT/Pulmonary:  - neg pulmonary ROS     Neurologic:  - neg neurologic ROS     Cardiovascular:  - neg cardiovascular ROS     METS/Exercise Tolerance:     Hematologic:     (+) History of blood clots, pt is not anticoagulated,     Musculoskeletal:  - neg musculoskeletal ROS     GI/Hepatic: Comment: History of necrotizing pancreatitis with treatment.      Renal/Genitourinary:  - neg Renal ROS     Endo:  - neg endo ROS     Psychiatric/Substance Use:  - neg psychiatric ROS     Infectious Disease:  - neg infectious disease ROS     Malignancy:   (+) Malignancy, History of Lymphoma/Leukemia.Lymph CA Active status post Chemo.        Other:  - neg other ROS          Physical Exam    Airway  airway exam normal           Respiratory Devices and Support         Dental  no notable dental history         Cardiovascular   cardiovascular exam normal          Pulmonary   pulmonary exam normal                OUTSIDE LABS:  CBC:   Lab Results   Component Value Date    WBC 2.8 (L) 06/15/2021    WBC 2.8 (L) 05/18/2021    HGB 12.2 (L) 06/15/2021    HGB 12.5 (L) 05/18/2021    HCT 34.2 (L) 06/15/2021    HCT 35.3 (L) 05/18/2021     06/15/2021     05/18/2021     BMP:   Lab Results   Component Value Date     06/15/2021     03/23/2021    POTASSIUM 4.1 06/15/2021    POTASSIUM 4.0 03/23/2021    CHLORIDE 110 (H) 06/15/2021    CHLORIDE 110 (H) 03/23/2021    CO2 26 06/15/2021    CO2 30 03/23/2021    BUN 17 06/15/2021    BUN 18  03/23/2021    CR 0.72 06/15/2021    CR 0.75 03/23/2021    GLC 88 06/15/2021    GLC 92 03/23/2021     COAGS:   Lab Results   Component Value Date    PTT 45 (H) 11/14/2019    INR 1.09 11/14/2019    FIBR 293 11/14/2019     POC:   Lab Results   Component Value Date    BGM 87 11/11/2019     HEPATIC:   Lab Results   Component Value Date    ALBUMIN 4.1 06/15/2021    PROTTOTAL 6.6 (L) 06/15/2021    ALT 20 06/15/2021    AST 9 06/15/2021    ALKPHOS 61 06/15/2021    BILITOTAL 0.5 06/15/2021     OTHER:   Lab Results   Component Value Date    LACT 0.4 (L) 11/11/2019    MICHAEL 8.5 06/15/2021    PHOS 4.2 11/17/2019    MAG 2.1 11/17/2019    LIPASE 21 (L) 05/14/2020    AMYLASE 24 (L) 05/14/2020    .0 (H) 11/11/2019       Anesthesia Plan    ASA Status:  3   NPO Status:  NPO Appropriate    Anesthesia Type: MAC.     - Reason for MAC: chronic cardiopulmonary disease   Induction: N/a (Midazolam/fentanyl worked well last time).   Maintenance: TIVA.        Consents    Anesthesia Plan(s) and associated risks, benefits, and realistic alternatives discussed. Questions answered and patient/representative(s) expressed understanding.     - Discussed with:  Patient      - Extended Intubation/Ventilatory Support Discussed: No.      - Patient is DNR/DNI Status: No    Use of blood products discussed: No .     Postoperative Care    Pain management: IV analgesics, Oral pain medications.   PONV prophylaxis: Ondansetron (or other 5HT-3)     Comments:                Henko Mike MD

## 2021-06-15 NOTE — PROGRESS NOTES
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 Lazaro presented with acute onset cough and was found to have an anterior mediastinal mass and malignant left-sided pleural effusion.  A CT guided biopsy was obtained at [facility] and pathology was consistent with T-cell leukemia vs lymphoma.  He was admitted to [facility] oncology service 8/30 and started on treatment per [id number].  He had a pleural effusion that required a chest tube and a pericardial effusion that was not drained. His Induction was complicated by cardiac compression secondary to his mass leading to tamponade, this improved through out his hospital stay.  He also had dysphagia that improved with treatment of his mass, swallow study was normal. His course was complicated by extensive bilateral UE DVTs, and he was successfully treated with anticoagulation. His consolidation course was complicated by the development of severe asparaginase induced necrotizing pancreatitis. He became quite ill with SIRS and associated hypotension, he required pressor support in the ICU. As a result, asparaginase was eliminated from his treatment plan. He was in CR status at end of consolidation. During maintenance, he developed hepatotoxicity so allopurinol and dose reduced 6MP were started for correction of skewed 6-MP metabolism. Lazaro comes to clinic today for Day 1 of his 6th Maintenance cycle.     HPI:   Since his last visit Lazaro has been doing very well.  His only concern is new canker sores.  One seems to resolve and then another one starts.  They are not interfering with his ability to eat or drink but are uncomfortable at times.  He has not had any lesions on his lips.    Lazaro's energy level is strong.  He is planning to quit his job after finding out new hires are making more money than he is.  He plans to get another job but ultimately wants to be a union .      Lazaro denies  GI upset.  Passing soft stool, no constipation.  Some mild bumps on his arms, not bothersome to him.  No pain.  No paresthesias or gait changes. Denies fever or respiratory symptoms. He hasn't gotten the COVID vaccine yet. Continues to be interested in this.     Review of systems:  The 10 point Review of Systems is negative other than noted in the HPI or here.      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 Former daily marajuana smoker, now only uses occasionally    PFMH:   Family History   Problem Relation Age of Onset     No Known Problems Mother      No Known Problems Father      Asthma Brother      Thyroid Cancer Paternal Grandmother      Melanoma Paternal Aunt        Social History:   Social History     Social History Narrative    Lazaro previously lived at home with his dad and step mom in [place] but is now staying with his grandma in [place]. Biological mother is involved in his life. Has 4 step-siblings and 1 biological sibling. 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 Has completed high school. 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  He has been enjoying fishing and video games.        Current Medications:  Current Outpatient  Medications on File Prior to Visit   Medication Sig Dispense Refill     allopurinol (ZYLOPRIM) 100 MG tablet Take 1 tablet (100 mg) by mouth daily 30 tablet 11     diphenhydrAMINE (BENADRYL) 25 MG capsule Take 1-2 capsules (25-50 mg) by mouth every 6 hours as needed (Breakthrough Nausea and Vomiting ) 30 capsule 1     lidocaine-prilocaine (EMLA) 2.5-2.5 % external cream Apply topically as needed for other (prior to port access) 30 g 6     mercaptopurine (PURINETHOL) 50 MG tablet Take 50mg (1 tablet) three days/week and 75mg (1.5 tablets) four days/week. 40 tablet 2     methotrexate 2.5 MG tablet Take 16 tablets (40 mg) by mouth once a week Do not take on Days of LP. 64 tablet 2     ondansetron (ZOFRAN-ODT) 8 MG ODT tab Take 1 tablet (8 mg) by mouth every 8 hours as needed for nausea 20 tablet 6     pantoprazole (PROTONIX) 40 MG EC tablet Take 1 tablet (40 mg) by mouth every morning (before breakfast) During weeks of taking prednisone. 30 tablet 2     polyethylene glycol (MIRALAX/GLYCOLAX) packet Take 17 g by mouth daily 30 packet 0     predniSONE (DELTASONE) 10 MG tablet Take 40mg (4 tabs) twice dialy for 5 days every 4 weeks. 40 tablet 2     sennosides (SENOKOT) 8.6 MG tablet Take 2 tablets by mouth 2 times daily as needed for constipation 60 each 1     sulfamethoxazole-trimethoprim (BACTRIM DS) 800-160 MG tablet Take 1 tablet by mouth Every Mon, Tues two times daily 16 tablet 11     Vitamin D, Cholecalciferol, 25 MCG (1000 UT) TABS Take 1 tablet (1,000 Units) by mouth daily 30 tablet 3     Reviewed medications with Lazaro. Confirmed oral chemo doses, and notes only missing 1 dose of 6MP. He is not taking Vitamin D currently.  Received [id number] influenza vaccine on 12/22/20     Physical Exam:   Temp:  [98.3  F (36.8  C)] 98.3  F (36.8  C)  Pulse:  [74] 74  Resp:  [16] 16  BP: (117)/(69) 117/69  Cuff Mean (mmHg):  [81] 81  SpO2:  [98 %] 98 %  Wt Readings from Last 4 Encounters:   06/15/21 75.5 kg (166 lb 7.2 oz)  "  05/18/21 77.5 kg (170 lb 13.7 oz)   04/20/21 77.7 kg (171 lb 4.8 oz)   03/23/21 76.3 kg (168 lb 3.4 oz)     Ht Readings from Last 2 Encounters:   06/15/21 1.843 m (6' 0.56\")   05/18/21 1.844 m (6' 0.6\")     General: Lazaro is alert, interactive and appropriate, well-appearing, in no distress  HEENT: Skull is atrauamatic and normocephalic.  Full head of hair. PERRLA, sclera are non icteric and not injected, EOM are intact. Nares are patent without drainage. Oropharynx clear, no plaques noted. Right buccal mucosa with one ulceration posteriorly. MMM.  Neck:  Supple without lymphadenopathy.   Lymph: There is no cervical, supraclavicular, axillary, lymphadenopathy palpated.  Cardiovascular:  HR is regular, S1, S2 no murmur.  Capillary refill is < 2 seconds.   Respiratory: No cough noted. Respirations are easy. Lungs are clear to auscultation throughout.  No crackles or wheezes.  Gastrointestinal: BS present in all quadrants.  Abdomen is soft and flat.  No pain with palpation. No hepatosplenomegaly or masses are palpated.  Skin: No concerning lesions.  Few scattered erythematous papules on his arms.  Neurological:  CN 2-12 grossly intact. Gait is normal.  No issues with balance. Sensation intact in hands and feet.     Musculoskeletal: Good strength and ROM in all extremities.  Strong dorsiflexion at ankles and great toes (5/5) bilaterally without any pain at the Achilles.     Labs:   Results for orders placed or performed during the hospital encounter of 06/15/21   CBC with platelets differential     Status: Abnormal   Result Value Ref Range    WBC 2.8 (L) 4.0 - 11.0 10e9/L    RBC Count 3.39 (L) 4.4 - 5.9 10e12/L    Hemoglobin 12.2 (L) 13.3 - 17.7 g/dL    Hematocrit 34.2 (L) 40.0 - 53.0 %     (H) 78 - 100 fl    MCH 36.0 (H) 26.5 - 33.0 pg    MCHC 35.7 31.5 - 36.5 g/dL    RDW 13.7 10.0 - 15.0 %    Platelet Count 235 150 - 450 10e9/L    Diff Method Automated Method     % Neutrophils 72.9 %    % Lymphocytes 15.5 % "    % Monocytes 6.5 %    % Eosinophils 4.7 %    % Basophils 0.4 %    % Immature Granulocytes 0.0 %    Nucleated RBCs 0 0 /100    Absolute Neutrophil 2.0 1.6 - 8.3 10e9/L    Absolute Lymphocytes 0.4 (L) 0.8 - 5.3 10e9/L    Absolute Monocytes 0.2 0.0 - 1.3 10e9/L    Absolute Eosinophils 0.1 0.0 - 0.7 10e9/L    Absolute Basophils 0.0 0.0 - 0.2 10e9/L    Abs Immature Granulocytes 0.0 0 - 0.4 10e9/L    Absolute Nucleated RBC 0.0    Comprehensive metabolic panel     Status: Abnormal   Result Value Ref Range    Sodium 141 133 - 144 mmol/L    Potassium 4.1 3.4 - 5.3 mmol/L    Chloride 110 (H) 94 - 109 mmol/L    Carbon Dioxide 26 20 - 32 mmol/L    Anion Gap 5 3 - 14 mmol/L    Glucose 88 70 - 99 mg/dL    Urea Nitrogen 17 7 - 30 mg/dL    Creatinine 0.72 0.66 - 1.25 mg/dL    GFR Estimate >90 >60 mL/min/[1.73_m2]    GFR Estimate If Black >90 >60 mL/min/[1.73_m2]    Calcium 8.5 8.5 - 10.1 mg/dL    Bilirubin Total 0.5 0.2 - 1.3 mg/dL    Albumin 4.1 3.4 - 5.0 g/dL    Protein Total 6.6 (L) 6.8 - 8.8 g/dL    Alkaline Phosphatase 61 40 - 150 U/L    ALT 20 0 - 70 U/L    AST 9 0 - 45 U/L     A Lumbar Puncture was performed in the Pediatric Sedation Suite. Informed consent was obtained prior to the procedure. Lazaro Lund was identified by facial recognition and ID arm band. A time-out was performed. Lazaro Lund was then placed in the left lateral decubitus position and the lumbosacral area was sterily prepped using Chloraprep followed by drape placement. Anatomic landmarks were identified by palpation. Then, a 22 gauge, 2.5 inch Quincke spinal needle was inserted just through the skin.  That needle was removed and a 3.5 inch Slava needle was easily inserted into the L3/L4 interspace. On the first attempt approximately 3 mL of clear and colorless cerebrospinal fluid was obtained to be sent to the lab for cell count analysis and cytospin. Following that, 15mg of intrathecal methotrexate in 6 mL of preservative-free normal  saline was infused without resistance. The needle was removed and a Band-Aid applied. Lazaro Lund tolerated this procedure very well.      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  He is being treated per [facility] protocol [id number]. He's had a CRu following Induction with a CR at the end of Consolidation. His course was complicated by extensive bilateral DVT and severe necrotizing pancreatitis requiring cessation of PEG-asparaginase from his treatment.  He comes to clinic today for Day 1 of Cycle 6 of Maintenance therapy.  He is doing very well overall.  Mild mucositis likely secondary to chemotherapy.  He is currently on 43% full dose of 6MP with allopurinol for correction of skewed 6-MP metabolism and 100% methotrexate.  His ANC is above target range for the second month in a row.  Mild folliculitis.     Plan:   1) Labs reviewed with Lazaro. Will increase 6MP to 50%= 75mg daily, continue methotrexate at 100% full dose= 16 tabs.  Reminded Lazaro to hold oral methotrexate this week since he is having a lumbar puncture.  2) Start prednisone burst today, continue protonix during bursts.  3) IV vincristine in clinic today.  4) Most recent Vit D level was normal however Lazaro should continue on Vit D 1000IU daily.  5) Bactrim for PCP prophylaxis.   6) Continue to check TGNs monthly moving forward.   7) Lazaro will need COVID testing prior to LPs moving forward.  He prefers to come the day of, have it done in sedation and then come to clinic for chemo while he is waiting for results.   8) Discussed COVID vaccine in detail.  Lazaro is willing to get vaccinated.  Discussed different vaccines and that he can schedule it through [facility].  I would like him to avoid vaccination during his prednisone burst or for the week after in order to maximize his immune response.   9) RTC in 4 weeks for Day 29 of cycle 6.    Félix Van, TATY    Total time spent  on the following services on the date of the encounter: 50 minutes  Preparing to see patient with chart review    Ordering medications, labs tests, chemotherapy   Communicating with other healthcare professionals and care coordination  Interpretation of labs  Performing a medically appropriate examination   Counseling and educating the patient/family/caregiver   Communicating results to the patient/family/caregiver   Documenting clinical information in the electronic health record

## 2021-06-16 LAB
APPEARANCE CSF: CLEAR
COLOR CSF: COLORLESS
RBC # CSF MANUAL: 7 /UL (ref 0–2)
TUBE # CSF: 1 #
WBC # CSF MANUAL: 1 /UL (ref 0–5)

## 2021-06-17 LAB
6-TGN ENTSUB RBC: 950 PMOL/8X10(8)RBC (ref 235–450)
6MMP ENTSUB RBC: <446 PMOL/8X10(8)RBC

## 2021-06-18 DIAGNOSIS — Z11.59 ENCOUNTER FOR SCREENING FOR OTHER VIRAL DISEASES: ICD-10-CM

## 2021-07-13 ENCOUNTER — INFUSION THERAPY VISIT (OUTPATIENT)
Dept: INFUSION THERAPY | Facility: CLINIC | Age: 21
End: 2021-07-13
Attending: NURSE PRACTITIONER
Payer: COMMERCIAL

## 2021-07-13 ENCOUNTER — OFFICE VISIT (OUTPATIENT)
Dept: PEDIATRIC HEMATOLOGY/ONCOLOGY | Facility: CLINIC | Age: 21
End: 2021-07-13
Attending: NURSE PRACTITIONER
Payer: COMMERCIAL

## 2021-07-13 VITALS
BODY MASS INDEX: 22.67 KG/M2 | HEIGHT: 73 IN | TEMPERATURE: 98.3 F | OXYGEN SATURATION: 98 % | WEIGHT: 171.08 LBS | DIASTOLIC BLOOD PRESSURE: 57 MMHG | SYSTOLIC BLOOD PRESSURE: 110 MMHG | HEART RATE: 67 BPM | RESPIRATION RATE: 16 BRPM

## 2021-07-13 DIAGNOSIS — C83.50 T LYMPHOBLASTIC LYMPHOMA (H): Primary | ICD-10-CM

## 2021-07-13 LAB
BASOPHILS # BLD AUTO: 0 10E3/UL (ref 0–0.2)
BASOPHILS NFR BLD AUTO: 1 %
EOSINOPHIL # BLD AUTO: 0.1 10E3/UL (ref 0–0.7)
EOSINOPHIL NFR BLD AUTO: 3 %
ERYTHROCYTE [DISTWIDTH] IN BLOOD BY AUTOMATED COUNT: 13.8 % (ref 10–15)
HCT VFR BLD AUTO: 34.3 % (ref 40–53)
HGB BLD-MCNC: 11.9 G/DL (ref 13.3–17.7)
IMM GRANULOCYTES # BLD: 0 10E3/UL
IMM GRANULOCYTES NFR BLD: 0 %
LYMPHOCYTES # BLD AUTO: 0.4 10E3/UL (ref 0.8–5.3)
LYMPHOCYTES NFR BLD AUTO: 13 %
MCH RBC QN AUTO: 35.6 PG (ref 26.5–33)
MCHC RBC AUTO-ENTMCNC: 34.7 G/DL (ref 31.5–36.5)
MCV RBC AUTO: 103 FL (ref 78–100)
MONOCYTES # BLD AUTO: 0.2 10E3/UL (ref 0–1.3)
MONOCYTES NFR BLD AUTO: 6 %
NEUTROPHILS # BLD AUTO: 2.4 10E3/UL (ref 1.6–8.3)
NEUTROPHILS NFR BLD AUTO: 77 %
NRBC # BLD AUTO: 0 10E3/UL
NRBC BLD AUTO-RTO: 0 /100
PLATELET # BLD AUTO: 193 10E3/UL (ref 150–450)
RBC # BLD AUTO: 3.34 10E6/UL (ref 4.4–5.9)
WBC # BLD AUTO: 3 10E3/UL (ref 4–11)

## 2021-07-13 PROCEDURE — 250N000011 HC RX IP 250 OP 636

## 2021-07-13 PROCEDURE — 96409 CHEMO IV PUSH SNGL DRUG: CPT

## 2021-07-13 PROCEDURE — 250N000011 HC RX IP 250 OP 636: Performed by: NURSE PRACTITIONER

## 2021-07-13 PROCEDURE — 99215 OFFICE O/P EST HI 40 MIN: CPT | Performed by: NURSE PRACTITIONER

## 2021-07-13 PROCEDURE — 36415 COLL VENOUS BLD VENIPUNCTURE: CPT

## 2021-07-13 PROCEDURE — 258N000003 HC RX IP 258 OP 636: Performed by: NURSE PRACTITIONER

## 2021-07-13 PROCEDURE — 80299 QUANTITATIVE ASSAY DRUG: CPT

## 2021-07-13 PROCEDURE — 80299 QUANTITATIVE ASSAY DRUG: CPT | Performed by: NURSE PRACTITIONER

## 2021-07-13 PROCEDURE — 85025 COMPLETE CBC W/AUTO DIFF WBC: CPT | Performed by: NURSE PRACTITIONER

## 2021-07-13 RX ORDER — LIDOCAINE 40 MG/G
CREAM TOPICAL
Status: CANCELLED | OUTPATIENT
Start: 2021-07-13

## 2021-07-13 RX ORDER — HEPARIN SODIUM (PORCINE) LOCK FLUSH IV SOLN 100 UNIT/ML 100 UNIT/ML
SOLUTION INTRAVENOUS
Status: COMPLETED
Start: 2021-07-13 | End: 2021-07-13

## 2021-07-13 RX ORDER — HEPARIN SODIUM (PORCINE) LOCK FLUSH IV SOLN 100 UNIT/ML 100 UNIT/ML
3-5 SOLUTION INTRAVENOUS
Status: DISCONTINUED | OUTPATIENT
Start: 2021-07-13 | End: 2021-07-13 | Stop reason: HOSPADM

## 2021-07-13 RX ORDER — HEPARIN SODIUM (PORCINE) LOCK FLUSH IV SOLN 100 UNIT/ML 100 UNIT/ML
3-5 SOLUTION INTRAVENOUS
Status: CANCELLED | OUTPATIENT
Start: 2021-07-13

## 2021-07-13 RX ADMIN — SODIUM CHLORIDE 50 ML: 9 INJECTION, SOLUTION INTRAVENOUS at 13:57

## 2021-07-13 RX ADMIN — HEPARIN SODIUM (PORCINE) LOCK FLUSH IV SOLN 100 UNIT/ML 500 UNITS: 100 SOLUTION at 13:57

## 2021-07-13 RX ADMIN — HEPARIN 500 UNITS: 100 SYRINGE at 13:57

## 2021-07-13 RX ADMIN — VINCRISTINE SULFATE 2 MG: 1 INJECTION, SOLUTION INTRAVENOUS at 13:58

## 2021-07-13 ASSESSMENT — PAIN SCALES - GENERAL: PAINLEVEL: NO PAIN (0)

## 2021-07-13 ASSESSMENT — MIFFLIN-ST. JEOR: SCORE: 1820.38

## 2021-07-13 NOTE — PROGRESS NOTES
Infusion Nursing Note    Lazaro PLUNKETT Kalerobbin Presents to [facility] today for: C6 D29 Vincristine    Due to : T lymphoblastic lymphoma (H)    Intravenous Access/Labs: Port accessed using sterile technique; + blood return noted and labs drawn.     Coping:   Child Family Life declined    Infusion Note: Pt arrived to clinic and denies any recent fever/infections; no new issues/concerns. Pt. Seen and assessed by ANGELY Salazar.  Vincristine given by gravity as ordered; + blood return noted pre/mid/post infusion. Port heparin locked at end of infusion. Home prescriptions refilled and provided to patient.

## 2021-07-13 NOTE — PROGRESS NOTES
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 Lazaro presented with acute onset cough and was found to have an anterior mediastinal mass and malignant left-sided pleural effusion.  A CT guided biopsy was obtained at [facility] and pathology was consistent with T-cell leukemia vs lymphoma.  He was admitted to [facility] oncology service 8/30 and started on treatment per [id number].  He had a pleural effusion that required a chest tube and a pericardial effusion that was not drained. His Induction was complicated by cardiac compression secondary to his mass leading to tamponade, this improved through out his hospital stay.  He also had dysphagia that improved with treatment of his mass, swallow study was normal. His course was complicated by extensive bilateral UE DVTs, and he was successfully treated with anticoagulation. His consolidation course was complicated by the development of severe asparaginase induced necrotizing pancreatitis. He became quite ill with SIRS and associated hypotension, he required pressor support in the ICU. As a result, asparaginase was eliminated from his treatment plan. He was in CR status at end of consolidation. During maintenance, he developed hepatotoxicity so allopurinol and dose reduced 6MP were started for correction of skewed 6-MP metabolism. Lazaro comes to clinic today for Day 29 of his 6th Maintenance cycle.     HPI:   Since his last visit Lazaro has done very well.  Canker sores have resolved. Continues to have some mild bumps on his arms, which are not bothersome. They seem to have decreased in redness and quantity in the last week. No complaints of pain. No paresthesias or gait changes.    No nausea or emesis. Has had very small amounts of blood noticed on the toilet paper when he wipes after a bowel movement. Also experiencing mild discomfort and feels like \"there might be a tear\" when he has a bowel " movement. Not currently using Miralax. Eating and drinking well. Denies fever or respiratory symptoms. He not gotten the COVID vaccine yet but continues to be interested in this & has even more interest now with the Delta variant becoming more prevalent. No other questions or concerns today.    Review of systems:  The 10 point Review of Systems is negative other than noted in the HPI or here.      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 Former daily marajuana smoker, now only uses occasionally    PFMH:   Family History   Problem Relation Age of Onset     No Known Problems Mother      No Known Problems Father      Asthma Brother      Thyroid Cancer Paternal Grandmother      Melanoma Paternal Aunt        Social History:   Social History     Social History Narrative    Lazaro previously lived at home with his dad and step mom in [place] but is now staying with his grandma in [place]. Biological mother is involved in his life. Has 4 step-siblings and 1 biological sibling. 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 Has completed high school. 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  He has been enjoying fishing and video games.        Current  Medications:  Current Outpatient Medications on File Prior to Visit   Medication Sig Dispense Refill     allopurinol (ZYLOPRIM) 100 MG tablet Take 1 tablet (100 mg) by mouth daily 30 tablet 11     diphenhydrAMINE (BENADRYL) 25 MG capsule Take 1-2 capsules (25-50 mg) by mouth every 6 hours as needed (Breakthrough Nausea and Vomiting ) 30 capsule 1     lidocaine-prilocaine (EMLA) 2.5-2.5 % external cream Apply topically as needed for other (prior to port access) 30 g 6     magic mouthwash suspension (diphenhydrAMINE, lidocaine, aluminum-magnesium & simethicone) Swish and spit 10 mLs in mouth 4 times daily (with meals and nightly) 120 mL 2     mercaptopurine (PURINETHOL) 50 MG tablet Take 75mg (1.5 tablets) daily 45 tablet 2     mercaptopurine (PURINETHOL) 50 MG tablet Take 50mg (1 tablet) three days/week and 75mg (1.5 tablets) four days/week. 40 tablet 2     methotrexate 2.5 MG tablet Take 16 tablets (40 mg) by mouth once a week Do not take on Days of LP. 64 tablet 2     methotrexate 2.5 MG tablet Take 16 tablets (40 mg) by mouth once a week Do not take on Days of LP. 64 tablet 2     ondansetron (ZOFRAN-ODT) 8 MG ODT tab Take 1 tablet (8 mg) by mouth every 8 hours as needed for nausea 20 tablet 6     pantoprazole (PROTONIX) 40 MG EC tablet Take 1 tablet (40 mg) by mouth every morning (before breakfast) During weeks of taking prednisone. 30 tablet 2     polyethylene glycol (MIRALAX/GLYCOLAX) packet Take 17 g by mouth daily 30 packet 0     predniSONE (DELTASONE) 10 MG tablet Take 40mg (4 tabs) twice dialy for 5 days every 4 weeks. 40 tablet 2     predniSONE (DELTASONE) 10 MG tablet Take 40mg (4 tabs) twice dialy for 5 days every 4 weeks. 40 tablet 2     sennosides (SENOKOT) 8.6 MG tablet Take 2 tablets by mouth 2 times daily as needed for constipation 60 each 1     sulfamethoxazole-trimethoprim (BACTRIM DS) 800-160 MG tablet Take 1 tablet by mouth Every Mon, Tues two times daily 16 tablet 11     Vitamin D,  "Cholecalciferol, 25 MCG (1000 UT) TABS Take 1 tablet (1,000 Units) by mouth daily 30 tablet 3     Reviewed medications with Lazaro. Confirmed oral chemo doses, and notes no missed doses. He is not taking Vitamin D currently.  Received [id number] influenza vaccine on 12/22/20     Physical Exam:   Temp:  [98.3  F (36.8  C)] 98.3  F (36.8  C)  Pulse:  [67] 67  Resp:  [16] 16  BP: (110)/(57) 110/57  SpO2:  [98 %] 98 %  Wt Readings from Last 4 Encounters:   07/13/21 77.6 kg (171 lb 1.2 oz)   06/15/21 75.5 kg (166 lb 7.2 oz)   05/18/21 77.5 kg (170 lb 13.7 oz)   04/20/21 77.7 kg (171 lb 4.8 oz)     Ht Readings from Last 2 Encounters:   07/13/21 1.847 m (6' 0.72\")   06/15/21 1.843 m (6' 0.56\")     General: Lazaro is alert, interactive and appropriate, well-appearing, in no distress  HEENT: Skull is atrauamatic and normocephalic.  Full head of hair. PERRLA, sclera are non icteric and not injected, EOM are intact. Nares are patent without drainage. Oropharynx clear, no plaques noted. No mucositis. MMM.  Neck:  Supple without lymphadenopathy.   Lymph: There is no cervical, supraclavicular, axillary, lymphadenopathy palpated.  Cardiovascular:  HR is regular, S1, S2 no murmur.  Capillary refill is < 2 seconds.   Respiratory: No cough noted. Respirations are easy. Lungs are clear to auscultation throughout.  No crackles or wheezes.  Gastrointestinal: BS present in all quadrants.  Abdomen is soft and flat.  No pain with palpation. No hepatosplenomegaly or masses are palpated.  Skin: No concerning lesions.  Few scattered erythematous papules on his arms.  Neurological:  CN 2-12 grossly intact. Gait is normal.  No issues with balance. Sensation intact in hands and feet.     Musculoskeletal: Good strength and ROM in all extremities.  Strong dorsiflexion at ankles and great toes (5/5) bilaterally without any pain at the Achilles.     Labs:   Results for orders placed or performed in visit on 07/13/21   CBC with platelets and " differential     Status: Abnormal   Result Value Ref Range    WBC Count 3.0 (L) 4.0 - 11.0 10e3/uL    RBC Count 3.34 (L) 4.40 - 5.90 10e6/uL    Hemoglobin 11.9 (L) 13.3 - 17.7 g/dL    Hematocrit 34.3 (L) 40.0 - 53.0 %     (H) 78 - 100 fL    MCH 35.6 (H) 26.5 - 33.0 pg    MCHC 34.7 31.5 - 36.5 g/dL    RDW 13.8 10.0 - 15.0 %    Platelet Count 193 150 - 450 10e3/uL    % Neutrophils 77 %    % Lymphocytes 13 %    % Monocytes 6 %    % Eosinophils 3 %    % Basophils 1 %    % Immature Granulocytes 0 %    NRBCs per 100 WBC 0 <1 /100    Absolute Neutrophils 2.4 1.6 - 8.3 10e3/uL    Absolute Lymphocytes 0.4 (L) 0.8 - 5.3 10e3/uL    Absolute Monocytes 0.2 0.0 - 1.3 10e3/uL    Absolute Eosinophils 0.1 0.0 - 0.7 10e3/uL    Absolute Basophils 0.0 0.0 - 0.2 10e3/uL    Absolute Immature Granulocytes 0.0 <=0.0 10e3/uL    Absolute NRBCs 0.0 10e3/uL   CBC with platelets differential     Status: Abnormal    Narrative    The following orders were created for panel order CBC with platelets differential.  Procedure                               Abnormality         Status                     ---------                               -----------         ------                     CBC with platelets and d...[[id number]]  Abnormal            Final result                 Please view results for these tests on the individual orders.       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  He is being treated per [facility] protocol [id number]. He's had a CRu following Induction with a CR at the end of Consolidation. His course was complicated by extensive bilateral DVT and severe necrotizing pancreatitis requiring cessation of PEG-asparaginase from his treatment.  He comes to clinic today for Day 29 of Cycle 6 of Maintenance therapy.  He is doing very well overall. He is currently on 50% full dose of 6MP with allopurinol for correction of skewed 6-MP metabolism and 100% methotrexate.   His ANC is above target range today at 2.4. Mild folliculitis slowly improving. Small anal fissure due to constipation.    Plan:   1) Labs reviewed with Lazaro. Due to increasing 6MP last month, we will continue 6MP to 50%= 75mg daily, continue methotrexate at 100% full dose= 16 tabs. Continue allopurinol. If ANC remains elevated next month, consider small increase of 6MP.  2) Start prednisone burst today, continue protonix during bursts.  3) IV vincristine in clinic today.  4) Most recent Vit D level was normal however Lazaro should continue on Vit D 1000IU daily.  5) Bactrim for PCP prophylaxis.   6) Continue to check TGNs monthly moving forward.  7) Lazaro will need COVID testing prior to LPs moving forward.  He prefers to come the day of, have it done in sedation and then come to clinic for chemo while he is waiting for results.   8) Previously discussed COVID vaccine in detail.  Lazaro is willing to get vaccinated. Discussed different vaccines and that he can schedule it through [employer]. There are also many other locations that he can receive the vaccine in the community that have same day appointments - such as [facility] or [facility]. Continue to avoid vaccination during his prednisone burst or for the week after in order to maximize his immune response.  9) Start miralax PRN, titrate to produce one soft stool daily. Apply aquaphor prior to bowel movements to decrease discomfort.   10) RTC in 4 weeks for Day 57 of cycle 6.    Marie Damon CNP    Total time spent on the following services on the date of the encounter: 40 minutes  Preparing to see patient with chart review    Ordering medications, labs tests, chemotherapy   Communicating with other healthcare professionals and care coordination  Interpretation of labs  Performing a medically appropriate examination   Counseling and educating the patient/family/caregiver   Communicating results to the patient/family/caregiver   Documenting clinical information in the  electronic health record

## 2021-07-13 NOTE — LETTER
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 Lazaro presented with acute onset cough and was found to have an anterior mediastinal mass and malignant left-sided pleural effusion.  A CT guided biopsy was obtained at [facility] and pathology was consistent with T-cell leukemia vs lymphoma.  He was admitted to [facility] oncology service 8/30 and started on treatment per [id number].  He had a pleural effusion that required a chest tube and a pericardial effusion that was not drained. His Induction was complicated by cardiac compression secondary to his mass leading to tamponade, this improved through out his hospital stay.  He also had dysphagia that improved with treatment of his mass, swallow study was normal. His course was complicated by extensive bilateral UE DVTs, and he was successfully treated with anticoagulation. His consolidation course was complicated by the development of severe asparaginase induced necrotizing pancreatitis. He became quite ill with SIRS and associated hypotension, he required pressor support in the ICU. As a result, asparaginase was eliminated from his treatment plan. He was in CR status at end of consolidation. During maintenance, he developed hepatotoxicity so allopurinol and dose reduced 6MP were started for correction of skewed 6-MP metabolism. Lazaro comes to clinic today for Day 29 of his 6th Maintenance cycle.     HPI:   Since his last visit Lazaro has done very well.  Canker sores have resolved. Continues to have some mild bumps on his arms, which are not bothersome. They seem to have decreased in redness and quantity in the last week. No complaints of pain. No paresthesias or gait changes.    No nausea or emesis. Has had very small amounts of blood noticed on the toilet paper when he wipes after a bowel movement. Also  "experiencing mild discomfort and feels like \"there might be a tear\" when he has a bowel movement. Not currently using Miralax. Eating and drinking well. Denies fever or respiratory symptoms. He not gotten the COVID vaccine yet but continues to be interested in this & has even more interest now with the Delta variant becoming more prevalent. No other questions or concerns today.    Review of systems:  The 10 point Review of Systems is negative other than noted in the HPI or here.      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 Former daily marajuana smoker, now only uses occasionally    PFMH:   Family History   Problem Relation Age of Onset     No Known Problems Mother      No Known Problems Father      Asthma Brother      Thyroid Cancer Paternal Grandmother      Melanoma Paternal Aunt        Social History:   Social History     Social History Narrative    Lazaro previously lived at home with his dad and step mom in [place] but is now staying with his grandma in [place]. Biological mother is involved in his life. Has 4 step-siblings and 1 biological sibling. 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 Has completed high school. 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  He has been enjoying [employer] and [employer] games.        Current Medications:  Current Outpatient Medications on File Prior to Visit   Medication Sig Dispense Refill     allopurinol (ZYLOPRIM) 100 MG tablet Take 1 tablet (100 mg) by mouth daily 30 tablet 11     diphenhydrAMINE (BENADRYL) 25 MG capsule Take 1-2 capsules (25-50 mg) by mouth every 6 hours as needed (Breakthrough Nausea and Vomiting ) 30 capsule 1     lidocaine-prilocaine (EMLA) 2.5-2.5 % external cream Apply topically as needed for other (prior to port access) 30 g 6     magic mouthwash suspension (diphenhydrAMINE, lidocaine, aluminum-magnesium & simethicone) Swish and spit 10 mLs in mouth 4 times daily (with meals and nightly) 120 mL 2     mercaptopurine (PURINETHOL) 50 MG tablet Take 75mg (1.5 tablets) daily 45 tablet 2     mercaptopurine (PURINETHOL) 50 MG tablet Take 50mg (1 tablet) three days/week and 75mg (1.5 tablets) four days/week. 40 tablet 2     methotrexate 2.5 MG tablet Take 16 tablets (40 mg) by mouth once a week Do not take on Days of LP. 64 tablet 2     methotrexate 2.5 MG tablet Take 16 tablets (40 mg) by mouth once a week Do not take on Days of LP. 64 tablet 2     ondansetron (ZOFRAN-ODT) 8 MG ODT tab Take 1 tablet (8 mg) by mouth every 8 hours as needed for nausea 20 tablet 6     pantoprazole (PROTONIX) 40 MG EC tablet Take 1 tablet (40 mg) by mouth every morning (before breakfast) During weeks of taking prednisone. 30 tablet 2     polyethylene glycol (MIRALAX/GLYCOLAX) packet Take 17 g by mouth daily 30 packet 0     predniSONE (DELTASONE) 10 MG tablet Take 40mg (4 tabs) twice dialy for 5 days every 4 weeks. 40 tablet 2     predniSONE (DELTASONE) 10 MG tablet Take 40mg (4 tabs) twice dialy for 5 days every 4 weeks. 40 tablet 2     sennosides (SENOKOT) 8.6 MG tablet Take 2 tablets by mouth 2 times daily as needed for constipation 60 each 1     sulfamethoxazole-trimethoprim (BACTRIM DS) 800-160 MG tablet Take  "1 tablet by mouth Every Mon, Tues two times daily 16 tablet 11     Vitamin D, Cholecalciferol, 25 MCG (1000 UT) TABS Take 1 tablet (1,000 Units) by mouth daily 30 tablet 3     Reviewed medications with Lazaro. Confirmed oral chemo doses, and notes no missed doses. He is not taking Vitamin D currently.  Received [id number] influenza vaccine on 12/22/20     Physical Exam:   Temp:  [98.3  F (36.8  C)] 98.3  F (36.8  C)  Pulse:  [67] 67  Resp:  [16] 16  BP: (110)/(57) 110/57  SpO2:  [98 %] 98 %  Wt Readings from Last 4 Encounters:   07/13/21 77.6 kg (171 lb 1.2 oz)   06/15/21 75.5 kg (166 lb 7.2 oz)   05/18/21 77.5 kg (170 lb 13.7 oz)   04/20/21 77.7 kg (171 lb 4.8 oz)     Ht Readings from Last 2 Encounters:   07/13/21 1.847 m (6' 0.72\")   06/15/21 1.843 m (6' 0.56\")     General: Lazaro is alert, interactive and appropriate, well-appearing, in no distress  HEENT: Skull is atrauamatic and normocephalic.  Full head of hair. PERRLA, sclera are non icteric and not injected, EOM are intact. Nares are patent without drainage. Oropharynx clear, no plaques noted. No mucositis. MMM.  Neck:  Supple without lymphadenopathy.   Lymph: There is no cervical, supraclavicular, axillary, lymphadenopathy palpated.  Cardiovascular:  HR is regular, S1, S2 no murmur.  Capillary refill is < 2 seconds.   Respiratory: No cough noted. Respirations are easy. Lungs are clear to auscultation throughout.  No crackles or wheezes.  Gastrointestinal: BS present in all quadrants.  Abdomen is soft and flat.  No pain with palpation. No hepatosplenomegaly or masses are palpated.  Skin: No concerning lesions.  Few scattered erythematous papules on his arms.  Neurological:  CN 2-12 grossly intact. Gait is normal.  No issues with balance. Sensation intact in hands and feet.     Musculoskeletal: Good strength and ROM in all extremities.  Strong dorsiflexion at ankles and great toes (5/5) bilaterally without any pain at the Achilles.     Labs:   Results for " orders placed or performed in visit on 07/13/21   CBC with platelets and differential     Status: Abnormal   Result Value Ref Range    WBC Count 3.0 (L) 4.0 - 11.0 10e3/uL    RBC Count 3.34 (L) 4.40 - 5.90 10e6/uL    Hemoglobin 11.9 (L) 13.3 - 17.7 g/dL    Hematocrit 34.3 (L) 40.0 - 53.0 %     (H) 78 - 100 fL    MCH 35.6 (H) 26.5 - 33.0 pg    MCHC 34.7 31.5 - 36.5 g/dL    RDW 13.8 10.0 - 15.0 %    Platelet Count 193 150 - 450 10e3/uL    % Neutrophils 77 %    % Lymphocytes 13 %    % Monocytes 6 %    % Eosinophils 3 %    % Basophils 1 %    % Immature Granulocytes 0 %    NRBCs per 100 WBC 0 <1 /100    Absolute Neutrophils 2.4 1.6 - 8.3 10e3/uL    Absolute Lymphocytes 0.4 (L) 0.8 - 5.3 10e3/uL    Absolute Monocytes 0.2 0.0 - 1.3 10e3/uL    Absolute Eosinophils 0.1 0.0 - 0.7 10e3/uL    Absolute Basophils 0.0 0.0 - 0.2 10e3/uL    Absolute Immature Granulocytes 0.0 <=0.0 10e3/uL    Absolute NRBCs 0.0 10e3/uL   CBC with platelets differential     Status: Abnormal    Narrative    The following orders were created for panel order CBC with platelets differential.  Procedure                               Abnormality         Status                     ---------                               -----------         ------                     CBC with platelets and d...[[id number]]  Abnormal            Final result                 Please view results for these tests on the individual orders.       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  He is being treated per [facility] protocol [id number]. He's had a CRu following Induction with a CR at the end of Consolidation. His course was complicated by extensive bilateral DVT and severe necrotizing pancreatitis requiring cessation of PEG-asparaginase from his treatment.  He comes to clinic today for Day 29 of Cycle 6 of Maintenance therapy.  He is doing very well overall. He is currently on 50% full dose of 6MP with  allopurinol for correction of skewed 6-MP metabolism and 100% methotrexate.  His ANC is above target range today at 2.4. Mild folliculitis slowly improving. Small anal fissure due to constipation.    Plan:   1) Labs reviewed with Lazaro. Due to increasing 6MP last month, we will continue 6MP to 50%= 75mg daily, continue methotrexate at 100% full dose= 16 tabs. Continue allopurinol. If ANC remains elevated next month, consider small increase of 6MP.  2) Start prednisone burst today, continue protonix during bursts.  3) IV vincristine in clinic today.  4) Most recent Vit D level was normal however Lazaro should continue on Vit D 1000IU daily.  5) Bactrim for PCP prophylaxis.   6) Continue to check TGNs monthly moving forward.  7) Lazaro will need COVID testing prior to LPs moving forward.  He prefers to come the day of, have it done in sedation and then come to clinic for chemo while he is waiting for results.   8) Previously discussed COVID vaccine in detail.  Lazaro is willing to get vaccinated. Discussed different vaccines and that he can schedule it through [employer]. There are also many other locations that he can receive the vaccine in the community that have same day appointments - such as [facility] or [facility]. Continue to avoid vaccination during his prednisone burst or for the week after in order to maximize his immune response.  9) Start miralax PRN, titrate to produce one soft stool daily. Apply aquaphor prior to bowel movements to decrease discomfort.   10) RTC in 4 weeks for Day 57 of cycle 6.    Marie Damon CNP    Total time spent on the following services on the date of the encounter: 40 minutes  Preparing to see patient with chart review    Ordering medications, labs tests, chemotherapy   Communicating with other healthcare professionals and care coordination  Interpretation of labs  Performing a medically appropriate examination   Counseling and educating the patient/family/caregiver   Communicating  results to the patient/family/caregiver   Documenting clinical information in the electronic health record         Marie Damon NP

## 2021-07-21 LAB — 6-TGN ENTSUB RBC: NORMAL

## 2021-07-30 LAB
6-TGN ENTSUB RBC: NORMAL
6MMP ENTSUB RBC: NORMAL

## 2021-08-09 NOTE — H&P (VIEW-ONLY)
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 Lazaro presented with acute onset cough and was found to have an anterior mediastinal mass and malignant left-sided pleural effusion.  A CT guided biopsy was obtained at [facility] and pathology was consistent with T-cell leukemia vs lymphoma.  He was admitted to [facility] oncology service 8/30 and started on treatment per [id number].  He had a pleural effusion that required a chest tube and a pericardial effusion that was not drained. His Induction was complicated by cardiac compression secondary to his mass leading to tamponade, this improved through out his hospital stay.  He also had dysphagia that improved with treatment of his mass, swallow study was normal. His course was complicated by extensive bilateral UE DVTs, and he was successfully treated with anticoagulation. His consolidation course was complicated by the development of severe asparaginase induced necrotizing pancreatitis. He became quite ill with SIRS and associated hypotension, he required pressor support in the ICU. As a result, asparaginase was eliminated from his treatment plan. He was in CR status at end of consolidation. During maintenance, he developed hepatotoxicity so allopurinol and dose reduced 6MP were started for correction of skewed 6-MP metabolism. Lazaro comes to clinic today for Day 57 of his 6th Maintenance cycle.     HPI:   Since his last visit Lazaro has done very well.  He reports good appetite and energy. He has not had nausea, vomiting, and his constipation was improved. He has not had any intermittent infections. Continues to deliberate on the COVID vaccine so continue to encourage scheduling an appointment. No bruising, bleeding, swollen glands; no neurologic symptoms; no new skin concerns although he noticed a new mole on his abdomen. He denies missing doses of his oral chemotherapy.    Review of  systems:  The 10 point Review of Systems is negative other than noted in the HPI or here.      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 Former daily marajuana smoker, now only uses occasionally    PFMH:   Family History   Problem Relation Age of Onset     No Known Problems Mother      No Known Problems Father      Asthma Brother      Thyroid Cancer Paternal Grandmother      Melanoma Paternal Aunt        Social History:   Social History     Social History Narrative    Lazaro previously lived at home with his dad and step mom in [place] but is now staying with his grandma in [place]. Biological mother is involved in his life. Has 4 step-siblings and 1 biological sibling. 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 Has completed high school. 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  He has been enjoying fishing and video games.        Current Medications:  Current Outpatient Medications on File Prior to Visit   Medication Sig Dispense Refill     diphenhydrAMINE (BENADRYL) 25 MG capsule Take 1-2 capsules (25-50 mg) by mouth every 6 hours as needed (Breakthrough Nausea and Vomiting ) 30 capsule 1     lidocaine-prilocaine (EMLA) 2.5-2.5 % external  "cream Apply topically as needed for other (prior to port access) 30 g 6     magic mouthwash suspension (diphenhydrAMINE, lidocaine, aluminum-magnesium & simethicone) Swish and spit 10 mLs in mouth 4 times daily (with meals and nightly) 120 mL 2     mercaptopurine (PURINETHOL) 50 MG tablet Take 75mg (1.5 tablets) daily 45 tablet 2     methotrexate 2.5 MG tablet Take 16 tablets (40 mg) by mouth once a week Do not take on Days of LP. 64 tablet 2     ondansetron (ZOFRAN-ODT) 8 MG ODT tab Take 1 tablet (8 mg) by mouth every 8 hours as needed for nausea 20 tablet 6     pantoprazole (PROTONIX) 40 MG EC tablet Take 1 tablet (40 mg) by mouth every morning (before breakfast) During weeks of taking prednisone. 30 tablet 2     polyethylene glycol (MIRALAX/GLYCOLAX) packet Take 17 g by mouth daily 30 packet 0     predniSONE (DELTASONE) 10 MG tablet Take 40mg (4 tabs) twice dialy for 5 days every 4 weeks. 40 tablet 2     sennosides (SENOKOT) 8.6 MG tablet Take 2 tablets by mouth 2 times daily as needed for constipation 60 each 1     Vitamin D, Cholecalciferol, 25 MCG (1000 UT) TABS Take 1 tablet (1,000 Units) by mouth daily 30 tablet 3     Reviewed medications with Lazaro. Confirmed oral chemo doses, and notes no missed doses. He is not taking Vitamin D currently.  Received [id number] influenza vaccine on 12/22/20     Physical Exam:   Temp:  [98.4  F (36.9  C)] 98.4  F (36.9  C)  Pulse:  [70] 70  Resp:  [18] 18  BP: (111)/(68) 111/68  SpO2:  [99 %] 99 %  Wt Readings from Last 4 Encounters:   08/10/21 78.3 kg (172 lb 9.9 oz)   07/13/21 77.6 kg (171 lb 1.2 oz)   06/15/21 75.5 kg (166 lb 7.2 oz)   05/18/21 77.5 kg (170 lb 13.7 oz)     Ht Readings from Last 2 Encounters:   08/10/21 1.85 m (6' 0.84\")   07/13/21 1.847 m (6' 0.72\")     General: Lazaro is alert, interactive and appropriate, well-appearing, in no distress  HEENT: Skull is atrauamatic and normocephalic.  Full head of hair. PERRLA, sclera are non icteric and not injected, " EOM are intact. Nares are patent without drainage. Oropharynx clear, no plaques noted. No mucositis. MMM.  Neck:  Supple without lymphadenopathy.   Lymph: There is no cervical, supraclavicular, axillary, lymphadenopathy palpated.  Cardiovascular:  HR is regular, S1, S2 no murmur.  Capillary refill is < 2 seconds.   Respiratory: No cough noted. Respirations are easy. Lungs are clear to auscultation throughout.  No crackles or wheezes.  Gastrointestinal: BS present in all quadrants.  Abdomen is soft and flat.  No pain with palpation. No hepatosplenomegaly or masses are palpated.  Skin: No concerning lesions. Few scattered erythematous papules on his arms.  Neurological:  CN 2-12 grossly intact. Gait is normal.  No issues with balance. Sensation intact in hands and feet.     Musculoskeletal: Good strength and ROM in all extremities.  Strong dorsiflexion at ankles and great toes (5/5) bilaterally without any pain at the Achilles.     Labs:   Results for orders placed or performed in visit on 08/10/21   CBC with platelets differential     Status: Abnormal    Narrative    The following orders were created for panel order CBC with platelets differential.  Procedure                               Abnormality         Status                     ---------                               -----------         ------                     CBC with platelets and d...[[id number]]  Abnormal            Final result                 Please view results for these tests on the individual orders.   CBC with platelets and differential     Status: Abnormal   Result Value Ref Range    WBC Count 2.3 (L) 4.0 - 11.0 10e3/uL    RBC Count 3.31 (L) 4.40 - 5.90 10e6/uL    Hemoglobin 11.8 (L) 13.3 - 17.7 g/dL    Hematocrit 33.5 (L) 40.0 - 53.0 %     (H) 78 - 100 fL    MCH 35.6 (H) 26.5 - 33.0 pg    MCHC 35.2 31.5 - 36.5 g/dL    RDW 14.1 10.0 - 15.0 %    Platelet Count 170 150 - 450 10e3/uL    % Neutrophils 72 %    % Lymphocytes 16 %    % Monocytes 8  %    % Eosinophils 4 %    % Basophils 0 %    % Immature Granulocytes 0 %    NRBCs per 100 WBC 0 <1 /100    Absolute Neutrophils 1.6 1.6 - 8.3 10e3/uL    Absolute Lymphocytes 0.4 (L) 0.8 - 5.3 10e3/uL    Absolute Monocytes 0.2 0.0 - 1.3 10e3/uL    Absolute Eosinophils 0.1 0.0 - 0.7 10e3/uL    Absolute Basophils 0.0 0.0 - 0.2 10e3/uL    Absolute Immature Granulocytes 0.0 <=0.0 10e3/uL    Absolute NRBCs 0.0 10e3/uL       Assessment:  Lazaro Lund is a 21 year old young man with T cell lymphoblastic lymphoma (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  He is being treated per COG protocol [id number]. He's had a CRu following Induction with a CR at the end of Consolidation. His course was complicated by extensive bilateral DVT and severe necrotizing pancreatitis requiring cessation of PEG-asparaginase from his treatment.  He comes to clinic today for Day 57 of Cycle 6 of Maintenance therapy. He is currently on 50% full dose of 6MP with allopurinol for correction of skewed 6-MP metabolism and 100% methotrexate..    Plan:   1) Labs reviewed with Lazaor. Continue 6MP to 50%= 75mg daily, continue methotrexate at 100% full dose= 16 tabs. Continue allopurinol.  2) Start prednisone burst today, continue protonix during bursts.  3) IV vincristine in clinic today.  4) Continue on Vit D 1000IU daily.  5) Bactrim for PCP prophylaxis.   6) Continue to check TGNs monthly moving forward.  7) Lazaro will need COVID testing prior to LPs moving forward.  He prefers to come the day of, have it done in sedation and then come to clinic for chemo while he is waiting for results.   8) Previously discussed COVID vaccine in detail.  Lazaro is willing to get vaccinated. Discussed different vaccines and that he can schedule it through [employer]. There are also many other locations that he can receive the vaccine in the community that have same day appointments - such as [employer] or [facility]. Continue to avoid vaccination during his prednisone burst or for the week  after in order to maximize his immune response.  9) Miralax PRN, titrate to produce one soft stool daily.  10) RTC in 4 weeks for Day 1 of cycle 7 with LP.    Signed,  Nicho Fischer   Pediatric Hematology/Oncology Fellow    Physician Attestation    I saw and evaluated the patient. I discussed with the fellow and agree with the findings and plan as documented in the fellow's note.     Reynaldo Campuzano MD  Pediatric Hematology/Oncology  [facility]

## 2021-08-09 NOTE — PROGRESS NOTES
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 Lazaro presented with acute onset cough and was found to have an anterior mediastinal mass and malignant left-sided pleural effusion.  A CT guided biopsy was obtained at [facility] and pathology was consistent with T-cell leukemia vs lymphoma.  He was admitted to [facility] oncology service 8/30 and started on treatment per [id number].  He had a pleural effusion that required a chest tube and a pericardial effusion that was not drained. His Induction was complicated by cardiac compression secondary to his mass leading to tamponade, this improved through out his hospital stay.  He also had dysphagia that improved with treatment of his mass, swallow study was normal. His course was complicated by extensive bilateral UE DVTs, and he was successfully treated with anticoagulation. His consolidation course was complicated by the development of severe asparaginase induced necrotizing pancreatitis. He became quite ill with SIRS and associated hypotension, he required pressor support in the ICU. As a result, asparaginase was eliminated from his treatment plan. He was in CR status at end of consolidation. During maintenance, he developed hepatotoxicity so allopurinol and dose reduced 6MP were started for correction of skewed 6-MP metabolism. Lazaro comes to clinic today for Day 57 of his 6th Maintenance cycle.     HPI:   Since his last visit Lazaro has done very well.  He reports good appetite and energy. He has not had nausea, vomiting, and his constipation was improved. He has not had any intermittent infections. Continues to deliberate on the COVID vaccine so continue to encourage scheduling an appointment. No bruising, bleeding, swollen glands; no neurologic symptoms; no new skin concerns although he noticed a new mole on his abdomen. He denies missing doses of his oral chemotherapy.    Review of  systems:  The 10 point Review of Systems is negative other than noted in the HPI or here.      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 Former daily marajuana smoker, now only uses occasionally    PFMH:   Family History   Problem Relation Age of Onset     No Known Problems Mother      No Known Problems Father      Asthma Brother      Thyroid Cancer Paternal Grandmother      Melanoma Paternal Aunt        Social History:   Social History     Social History Narrative    Lazaro previously lived at home with his dad and step mom in [place] but is now staying with his grandma in [place]. Biological mother is involved in his life. Has 4 step-siblings and 1 biological sibling. 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 Has completed high school. 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  He has been enjoying fishing and video games.        Current Medications:  Current Outpatient Medications on File Prior to Visit   Medication Sig Dispense Refill     diphenhydrAMINE (BENADRYL) 25 MG capsule Take 1-2 capsules (25-50 mg) by mouth every 6 hours as needed (Breakthrough Nausea and Vomiting ) 30 capsule 1     lidocaine-prilocaine (EMLA) 2.5-2.5 % external  "cream Apply topically as needed for other (prior to port access) 30 g 6     magic mouthwash suspension (diphenhydrAMINE, lidocaine, aluminum-magnesium & simethicone) Swish and spit 10 mLs in mouth 4 times daily (with meals and nightly) 120 mL 2     mercaptopurine (PURINETHOL) 50 MG tablet Take 75mg (1.5 tablets) daily 45 tablet 2     methotrexate 2.5 MG tablet Take 16 tablets (40 mg) by mouth once a week Do not take on Days of LP. 64 tablet 2     ondansetron (ZOFRAN-ODT) 8 MG ODT tab Take 1 tablet (8 mg) by mouth every 8 hours as needed for nausea 20 tablet 6     pantoprazole (PROTONIX) 40 MG EC tablet Take 1 tablet (40 mg) by mouth every morning (before breakfast) During weeks of taking prednisone. 30 tablet 2     polyethylene glycol (MIRALAX/GLYCOLAX) packet Take 17 g by mouth daily 30 packet 0     predniSONE (DELTASONE) 10 MG tablet Take 40mg (4 tabs) twice dialy for 5 days every 4 weeks. 40 tablet 2     sennosides (SENOKOT) 8.6 MG tablet Take 2 tablets by mouth 2 times daily as needed for constipation 60 each 1     Vitamin D, Cholecalciferol, 25 MCG (1000 UT) TABS Take 1 tablet (1,000 Units) by mouth daily 30 tablet 3     Reviewed medications with Lazaro. Confirmed oral chemo doses, and notes no missed doses. He is not taking Vitamin D currently.  Received [id number] influenza vaccine on 12/22/20     Physical Exam:   Temp:  [98.4  F (36.9  C)] 98.4  F (36.9  C)  Pulse:  [70] 70  Resp:  [18] 18  BP: (111)/(68) 111/68  SpO2:  [99 %] 99 %  Wt Readings from Last 4 Encounters:   08/10/21 78.3 kg (172 lb 9.9 oz)   07/13/21 77.6 kg (171 lb 1.2 oz)   06/15/21 75.5 kg (166 lb 7.2 oz)   05/18/21 77.5 kg (170 lb 13.7 oz)     Ht Readings from Last 2 Encounters:   08/10/21 1.85 m (6' 0.84\")   07/13/21 1.847 m (6' 0.72\")     General: Lazaro is alert, interactive and appropriate, well-appearing, in no distress  HEENT: Skull is atrauamatic and normocephalic.  Full head of hair. PERRLA, sclera are non icteric and not injected, " EOM are intact. Nares are patent without drainage. Oropharynx clear, no plaques noted. No mucositis. MMM.  Neck:  Supple without lymphadenopathy.   Lymph: There is no cervical, supraclavicular, axillary, lymphadenopathy palpated.  Cardiovascular:  HR is regular, S1, S2 no murmur.  Capillary refill is < 2 seconds.   Respiratory: No cough noted. Respirations are easy. Lungs are clear to auscultation throughout.  No crackles or wheezes.  Gastrointestinal: BS present in all quadrants.  Abdomen is soft and flat.  No pain with palpation. No hepatosplenomegaly or masses are palpated.  Skin: No concerning lesions. Few scattered erythematous papules on his arms.  Neurological:  CN 2-12 grossly intact. Gait is normal.  No issues with balance. Sensation intact in hands and feet.     Musculoskeletal: Good strength and ROM in all extremities.  Strong dorsiflexion at ankles and great toes (5/5) bilaterally without any pain at the Achilles.     Labs:   Results for orders placed or performed in visit on 08/10/21   CBC with platelets differential     Status: Abnormal    Narrative    The following orders were created for panel order CBC with platelets differential.  Procedure                               Abnormality         Status                     ---------                               -----------         ------                     CBC with platelets and d...[[id number]]  Abnormal            Final result                 Please view results for these tests on the individual orders.   CBC with platelets and differential     Status: Abnormal   Result Value Ref Range    WBC Count 2.3 (L) 4.0 - 11.0 10e3/uL    RBC Count 3.31 (L) 4.40 - 5.90 10e6/uL    Hemoglobin 11.8 (L) 13.3 - 17.7 g/dL    Hematocrit 33.5 (L) 40.0 - 53.0 %     (H) 78 - 100 fL    MCH 35.6 (H) 26.5 - 33.0 pg    MCHC 35.2 31.5 - 36.5 g/dL    RDW 14.1 10.0 - 15.0 %    Platelet Count 170 150 - 450 10e3/uL    % Neutrophils 72 %    % Lymphocytes 16 %    % Monocytes 8  %    % Eosinophils 4 %    % Basophils 0 %    % Immature Granulocytes 0 %    NRBCs per 100 WBC 0 <1 /100    Absolute Neutrophils 1.6 1.6 - 8.3 10e3/uL    Absolute Lymphocytes 0.4 (L) 0.8 - 5.3 10e3/uL    Absolute Monocytes 0.2 0.0 - 1.3 10e3/uL    Absolute Eosinophils 0.1 0.0 - 0.7 10e3/uL    Absolute Basophils 0.0 0.0 - 0.2 10e3/uL    Absolute Immature Granulocytes 0.0 <=0.0 10e3/uL    Absolute NRBCs 0.0 10e3/uL       Assessment:  Lazaro Lund is a 21 year old young man with T cell lymphoblastic lymphoma (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  He is being treated per COG protocol [id number]. He's had a CRu following Induction with a CR at the end of Consolidation. His course was complicated by extensive bilateral DVT and severe necrotizing pancreatitis requiring cessation of PEG-asparaginase from his treatment.  He comes to clinic today for Day 57 of Cycle 6 of Maintenance therapy. He is currently on 50% full dose of 6MP with allopurinol for correction of skewed 6-MP metabolism and 100% methotrexate..    Plan:   1) Labs reviewed with Lazaro. Continue 6MP to 50%= 75mg daily, continue methotrexate at 100% full dose= 16 tabs. Continue allopurinol.  2) Start prednisone burst today, continue protonix during bursts.  3) IV vincristine in clinic today.  4) Continue on Vit D 1000IU daily.  5) Bactrim for PCP prophylaxis.   6) Continue to check TGNs monthly moving forward.  7) Lazaro will need COVID testing prior to LPs moving forward.  He prefers to come the day of, have it done in sedation and then come to clinic for chemo while he is waiting for results.   8) Previously discussed COVID vaccine in detail.  Lazaro is willing to get vaccinated. Discussed different vaccines and that he can schedule it through [employer]. There are also many other locations that he can receive the vaccine in the community that have same day appointments - such as [employer] or [employer]. Continue to avoid vaccination during his prednisone burst or for the week  after in order to maximize his immune response.  9) Miralax PRN, titrate to produce one soft stool daily.  10) RTC in 4 weeks for Day 1 of cycle 7 with LP.    Signed,  Nicho Fischer   Pediatric Hematology/Oncology Fellow    Physician Attestation    I saw and evaluated the patient. I discussed with the fellow and agree with the findings and plan as documented in the fellow's note.     Reynaldo Campuzano MD  Pediatric Hematology/Oncology  [facility]

## 2021-08-10 ENCOUNTER — OFFICE VISIT (OUTPATIENT)
Dept: PEDIATRIC HEMATOLOGY/ONCOLOGY | Facility: CLINIC | Age: 21
End: 2021-08-10
Attending: PEDIATRICS
Payer: COMMERCIAL

## 2021-08-10 ENCOUNTER — INFUSION THERAPY VISIT (OUTPATIENT)
Dept: INFUSION THERAPY | Facility: CLINIC | Age: 21
End: 2021-08-10
Attending: PEDIATRICS
Payer: COMMERCIAL

## 2021-08-10 VITALS
OXYGEN SATURATION: 99 % | SYSTOLIC BLOOD PRESSURE: 111 MMHG | DIASTOLIC BLOOD PRESSURE: 68 MMHG | TEMPERATURE: 98.4 F | BODY MASS INDEX: 22.88 KG/M2 | RESPIRATION RATE: 18 BRPM | HEIGHT: 73 IN | WEIGHT: 172.62 LBS | HEART RATE: 70 BPM

## 2021-08-10 DIAGNOSIS — C83.50 T LYMPHOBLASTIC LYMPHOMA (H): ICD-10-CM

## 2021-08-10 DIAGNOSIS — C83.50 T LYMPHOBLASTIC LYMPHOMA (H): Primary | ICD-10-CM

## 2021-08-10 LAB
BASOPHILS # BLD AUTO: 0 10E3/UL (ref 0–0.2)
BASOPHILS NFR BLD AUTO: 0 %
EOSINOPHIL # BLD AUTO: 0.1 10E3/UL (ref 0–0.7)
EOSINOPHIL NFR BLD AUTO: 4 %
ERYTHROCYTE [DISTWIDTH] IN BLOOD BY AUTOMATED COUNT: 14.1 % (ref 10–15)
HCT VFR BLD AUTO: 33.5 % (ref 40–53)
HGB BLD-MCNC: 11.8 G/DL (ref 13.3–17.7)
IMM GRANULOCYTES # BLD: 0 10E3/UL
IMM GRANULOCYTES NFR BLD: 0 %
LYMPHOCYTES # BLD AUTO: 0.4 10E3/UL (ref 0.8–5.3)
LYMPHOCYTES NFR BLD AUTO: 16 %
MCH RBC QN AUTO: 35.6 PG (ref 26.5–33)
MCHC RBC AUTO-ENTMCNC: 35.2 G/DL (ref 31.5–36.5)
MCV RBC AUTO: 101 FL (ref 78–100)
MONOCYTES # BLD AUTO: 0.2 10E3/UL (ref 0–1.3)
MONOCYTES NFR BLD AUTO: 8 %
NEUTROPHILS # BLD AUTO: 1.6 10E3/UL (ref 1.6–8.3)
NEUTROPHILS NFR BLD AUTO: 72 %
NRBC # BLD AUTO: 0 10E3/UL
NRBC BLD AUTO-RTO: 0 /100
PLATELET # BLD AUTO: 170 10E3/UL (ref 150–450)
RBC # BLD AUTO: 3.31 10E6/UL (ref 4.4–5.9)
WBC # BLD AUTO: 2.3 10E3/UL (ref 4–11)

## 2021-08-10 PROCEDURE — 85025 COMPLETE CBC W/AUTO DIFF WBC: CPT | Performed by: NURSE PRACTITIONER

## 2021-08-10 PROCEDURE — 99214 OFFICE O/P EST MOD 30 MIN: CPT | Mod: GC | Performed by: STUDENT IN AN ORGANIZED HEALTH CARE EDUCATION/TRAINING PROGRAM

## 2021-08-10 PROCEDURE — 250N000011 HC RX IP 250 OP 636: Performed by: NURSE PRACTITIONER

## 2021-08-10 PROCEDURE — 80299 QUANTITATIVE ASSAY DRUG: CPT | Performed by: NURSE PRACTITIONER

## 2021-08-10 PROCEDURE — 250N000011 HC RX IP 250 OP 636

## 2021-08-10 PROCEDURE — 96409 CHEMO IV PUSH SNGL DRUG: CPT

## 2021-08-10 PROCEDURE — 36591 DRAW BLOOD OFF VENOUS DEVICE: CPT | Performed by: NURSE PRACTITIONER

## 2021-08-10 PROCEDURE — 258N000003 HC RX IP 258 OP 636: Performed by: NURSE PRACTITIONER

## 2021-08-10 RX ORDER — LIDOCAINE 40 MG/G
CREAM TOPICAL
Status: CANCELLED | OUTPATIENT
Start: 2021-08-10

## 2021-08-10 RX ORDER — SULFAMETHOXAZOLE/TRIMETHOPRIM 800-160 MG
1 TABLET ORAL
Qty: 16 TABLET | Refills: 11 | Status: SHIPPED | OUTPATIENT
Start: 2021-08-10 | End: 2022-02-22

## 2021-08-10 RX ORDER — HEPARIN SODIUM (PORCINE) LOCK FLUSH IV SOLN 100 UNIT/ML 100 UNIT/ML
SOLUTION INTRAVENOUS
Status: COMPLETED
Start: 2021-08-10 | End: 2021-08-10

## 2021-08-10 RX ORDER — HEPARIN SODIUM (PORCINE) LOCK FLUSH IV SOLN 100 UNIT/ML 100 UNIT/ML
3-5 SOLUTION INTRAVENOUS
Status: DISCONTINUED | OUTPATIENT
Start: 2021-08-10 | End: 2021-08-10 | Stop reason: HOSPADM

## 2021-08-10 RX ORDER — ALLOPURINOL 100 MG/1
100 TABLET ORAL DAILY
Qty: 30 TABLET | Refills: 11 | Status: SHIPPED | OUTPATIENT
Start: 2021-08-10 | End: 2022-01-07

## 2021-08-10 RX ORDER — HEPARIN SODIUM (PORCINE) LOCK FLUSH IV SOLN 100 UNIT/ML 100 UNIT/ML
3-5 SOLUTION INTRAVENOUS
Status: CANCELLED | OUTPATIENT
Start: 2021-08-10

## 2021-08-10 RX ADMIN — HEPARIN 5 ML: 100 SYRINGE at 11:53

## 2021-08-10 RX ADMIN — VINCRISTINE SULFATE 2 MG: 1 INJECTION, SOLUTION INTRAVENOUS at 11:53

## 2021-08-10 RX ADMIN — HEPARIN SODIUM (PORCINE) LOCK FLUSH IV SOLN 100 UNIT/ML 5 ML: 100 SOLUTION at 11:53

## 2021-08-10 ASSESSMENT — MIFFLIN-ST. JEOR: SCORE: 1829.26

## 2021-08-10 NOTE — LETTER
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 Lazaro presented with acute onset cough and was found to have an anterior mediastinal mass and malignant left-sided pleural effusion.  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  He was admitted to [facility] oncology service 8/30 and started on treatment per [id number].  He had a pleural effusion that required a chest tube and a pericardial effusion that was not drained. His Induction was complicated by cardiac compression secondary to his mass leading to tamponade, this improved through out his hospital stay.  He also had dysphagia that improved with treatment of his mass, swallow study was normal. His course was complicated by extensive bilateral UE DVTs, and he was successfully treated with anticoagulation. His consolidation course was complicated by the development of severe asparaginase induced necrotizing pancreatitis. He became quite ill with SIRS and associated hypotension, he required pressor support in the ICU. As a result, asparaginase was eliminated from his treatment plan. He was in CR status at end of consolidation. During maintenance, he developed hepatotoxicity so allopurinol and dose reduced 6MP were started for correction of skewed 6-MP metabolism. Lazaro comes to clinic today for Day 57 of his 6th Maintenance cycle.     HPI:   Since his last visit Lazaro has done very well.  He reports good appetite and energy. He has not had nausea, vomiting, and his constipation was improved. He has not had any intermittent infections. Continues to deliberate on the COVID vaccine so continue to encourage scheduling an appointment. No bruising, bleeding, swollen glands; no neurologic symptoms; no new skin concerns although he noticed a new  mole on his abdomen. He denies missing doses of his oral chemotherapy.    Review of systems:  The 10 point Review of Systems is negative other than noted in the HPI or here.      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 Former daily marajuana smoker, now only uses occasionally    PFMH:   Family History   Problem Relation Age of Onset     No Known Problems Mother      No Known Problems Father      Asthma Brother      Thyroid Cancer Paternal Grandmother      Melanoma Paternal Aunt        Social History:   Social History     Social History Narrative    Lazaro previously lived at home with his dad and step mom in [place] but is now staying with his grandma in [place]. Biological mother is involved in his life. Has 4 step-siblings and 1 biological sibling. 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 Has completed high school. 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  He has been enjoying fishing and video games.        Current Medications:  Current Outpatient Medications on File Prior to Visit   Medication Sig Dispense Refill     diphenhydrAMINE (BENADRYL) 25 MG capsule Take 1-2 capsules (25-50 mg) by mouth every 6 hours as needed (Breakthrough  "Nausea and Vomiting ) 30 capsule 1     lidocaine-prilocaine (EMLA) 2.5-2.5 % external cream Apply topically as needed for other (prior to port access) 30 g 6     magic mouthwash suspension (diphenhydrAMINE, lidocaine, aluminum-magnesium & simethicone) Swish and spit 10 mLs in mouth 4 times daily (with meals and nightly) 120 mL 2     mercaptopurine (PURINETHOL) 50 MG tablet Take 75mg (1.5 tablets) daily 45 tablet 2     methotrexate 2.5 MG tablet Take 16 tablets (40 mg) by mouth once a week Do not take on Days of LP. 64 tablet 2     ondansetron (ZOFRAN-ODT) 8 MG ODT tab Take 1 tablet (8 mg) by mouth every 8 hours as needed for nausea 20 tablet 6     pantoprazole (PROTONIX) 40 MG EC tablet Take 1 tablet (40 mg) by mouth every morning (before breakfast) During weeks of taking prednisone. 30 tablet 2     polyethylene glycol (MIRALAX/GLYCOLAX) packet Take 17 g by mouth daily 30 packet 0     predniSONE (DELTASONE) 10 MG tablet Take 40mg (4 tabs) twice dialy for 5 days every 4 weeks. 40 tablet 2     sennosides (SENOKOT) 8.6 MG tablet Take 2 tablets by mouth 2 times daily as needed for constipation 60 each 1     Vitamin D, Cholecalciferol, 25 MCG (1000 UT) TABS Take 1 tablet (1,000 Units) by mouth daily 30 tablet 3     Reviewed medications with Lazaro. Confirmed oral chemo doses, and notes no missed doses. He is not taking Vitamin D currently.  Received [id number] influenza vaccine on 12/22/20     Physical Exam:   Temp:  [98.4  F (36.9  C)] 98.4  F (36.9  C)  Pulse:  [70] 70  Resp:  [18] 18  BP: (111)/(68) 111/68  SpO2:  [99 %] 99 %  Wt Readings from Last 4 Encounters:   08/10/21 78.3 kg (172 lb 9.9 oz)   07/13/21 77.6 kg (171 lb 1.2 oz)   06/15/21 75.5 kg (166 lb 7.2 oz)   05/18/21 77.5 kg (170 lb 13.7 oz)     Ht Readings from Last 2 Encounters:   08/10/21 1.85 m (6' 0.84\")   07/13/21 1.847 m (6' 0.72\")     General: Lazaro is alert, interactive and appropriate, well-appearing, in no distress  HEENT: Skull is atrauamatic and " normocephalic.  Full head of hair. PERRLA, sclera are non icteric and not injected, EOM are intact. Nares are patent without drainage. Oropharynx clear, no plaques noted. No mucositis. MMM.  Neck:  Supple without lymphadenopathy.   Lymph: There is no cervical, supraclavicular, axillary, lymphadenopathy palpated.  Cardiovascular:  HR is regular, S1, S2 no murmur.  Capillary refill is < 2 seconds.   Respiratory: No cough noted. Respirations are easy. Lungs are clear to auscultation throughout.  No crackles or wheezes.  Gastrointestinal: BS present in all quadrants.  Abdomen is soft and flat.  No pain with palpation. No hepatosplenomegaly or masses are palpated.  Skin: No concerning lesions. Few scattered erythematous papules on his arms.  Neurological:  CN 2-12 grossly intact. Gait is normal.  No issues with balance. Sensation intact in hands and feet.     Musculoskeletal: Good strength and ROM in all extremities.  Strong dorsiflexion at ankles and great toes (5/5) bilaterally without any pain at the Achilles.     Labs:   Results for orders placed or performed in visit on 08/10/21   CBC with platelets differential     Status: Abnormal    Narrative    The following orders were created for panel order CBC with platelets differential.  Procedure                               Abnormality         Status                     ---------                               -----------         ------                     CBC with platelets and d...[[id number]]  Abnormal            Final result                 Please view results for these tests on the individual orders.   CBC with platelets and differential     Status: Abnormal   Result Value Ref Range    WBC Count 2.3 (L) 4.0 - 11.0 10e3/uL    RBC Count 3.31 (L) 4.40 - 5.90 10e6/uL    Hemoglobin 11.8 (L) 13.3 - 17.7 g/dL    Hematocrit 33.5 (L) 40.0 - 53.0 %     (H) 78 - 100 fL    MCH 35.6 (H) 26.5 - 33.0 pg    MCHC 35.2 31.5 - 36.5 g/dL    RDW 14.1 10.0 - 15.0 %    Platelet  Count 170 150 - 450 10e3/uL    % Neutrophils 72 %    % Lymphocytes 16 %    % Monocytes 8 %    % Eosinophils 4 %    % Basophils 0 %    % Immature Granulocytes 0 %    NRBCs per 100 WBC 0 <1 /100    Absolute Neutrophils 1.6 1.6 - 8.3 10e3/uL    Absolute Lymphocytes 0.4 (L) 0.8 - 5.3 10e3/uL    Absolute Monocytes 0.2 0.0 - 1.3 10e3/uL    Absolute Eosinophils 0.1 0.0 - 0.7 10e3/uL    Absolute Basophils 0.0 0.0 - 0.2 10e3/uL    Absolute Immature Granulocytes 0.0 <=0.0 10e3/uL    Absolute NRBCs 0.0 10e3/uL       Assessment:  Lazaro Lund is a 21 year old young man with T cell lymphoblastic lymphoma (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  He is being treated per [facility] protocol [id number]. He's had a CRu following Induction with a CR at the end of Consolidation. His course was complicated by extensive bilateral DVT and severe necrotizing pancreatitis requiring cessation of PEG-asparaginase from his treatment.  He comes to clinic today for Day 57 of Cycle 6 of Maintenance therapy. He is currently on 50% full dose of 6MP with allopurinol for correction of skewed 6-MP metabolism and 100% methotrexate..    Plan:   1) Labs reviewed with Lazaro. Continue 6MP to 50%= 75mg daily, continue methotrexate at 100% full dose= 16 tabs. Continue allopurinol.  2) Start prednisone burst today, continue protonix during bursts.  3) IV vincristine in clinic today.  4) Continue on Vit D 1000IU daily.  5) Bactrim for PCP prophylaxis.   6) Continue to check TGNs monthly moving forward.  7) Lazaro will need COVID testing prior to LPs moving forward.  He prefers to come the day of, have it done in sedation and then come to clinic for chemo while he is waiting for results.   8) Previously discussed COVID vaccine in detail.  Lazaro is willing to get vaccinated. Discussed different vaccines and that he can schedule it through [employer]. There are also many other locations that he can receive the vaccine in the community that have same day appointments - such as  [facility] or [facility]. Continue to avoid vaccination during his prednisone burst or for the week after in order to maximize his immune response.  9) Miralax PRN, titrate to produce one soft stool daily.  10) RTC in 4 weeks for Day 1 of cycle 7 with LP.    Signed,  Nicho Fischer   Pediatric Hematology/Oncology Fellow    Physician Attestation    I saw and evaluated the patient. I discussed with the fellow and agree with the findings and plan as documented in the fellow's note.     Reynaldo Campuzano MD  Pediatric Hematology/Oncology  [facility]      Nicho Fischer MD

## 2021-08-10 NOTE — PROGRESS NOTES
Infusion Nursing Note    Lazaro PLUNKETT Kalerobbin Presents to [facility]fusion [facility] today for: C6 D57 Vincristine    Due to : T lymphoblastic lymphoma (H)    Intravenous Access/Labs: Port accessed using sterile technique; + blood return noted and labs drawn.     Coping:   Child Family Life declined    Infusion Note: Pt arrived to clinic and denies any recent fever/infections; no new issues/concerns. Pt. Seen and assessed by Dr. Fischer. Vincristine given by gravity as ordered; + blood return noted pre/mid/post infusion. Port heparin locked at end of infusion. Home prescriptions refilled and provided to patient.

## 2021-08-13 LAB
6-TGN ENTSUB RBC: 859 PMOL/8X10(8)RBC (ref 235–450)
6MMP ENTSUB RBC: 899 PMOL/8X10(8)RBC

## 2021-08-24 RX ORDER — ALBUTEROL SULFATE 0.83 MG/ML
2.5 SOLUTION RESPIRATORY (INHALATION)
Status: CANCELLED | OUTPATIENT
Start: 2021-10-05

## 2021-08-24 RX ORDER — ALBUTEROL SULFATE 90 UG/1
1-2 AEROSOL, METERED RESPIRATORY (INHALATION)
Status: CANCELLED
Start: 2021-10-05

## 2021-08-24 RX ORDER — SODIUM CHLORIDE 9 MG/ML
200 INJECTION, SOLUTION INTRAVENOUS CONTINUOUS PRN
Status: CANCELLED | OUTPATIENT
Start: 2021-11-02

## 2021-08-24 RX ORDER — EPINEPHRINE 1 MG/ML
0.3 INJECTION, SOLUTION, CONCENTRATE INTRAVENOUS EVERY 5 MIN PRN
Status: CANCELLED | OUTPATIENT
Start: 2021-10-05

## 2021-08-24 RX ORDER — DIPHENHYDRAMINE HYDROCHLORIDE 50 MG/ML
50 INJECTION INTRAMUSCULAR; INTRAVENOUS
Status: CANCELLED
Start: 2021-11-02

## 2021-08-24 RX ORDER — METHYLPREDNISOLONE SODIUM SUCCINATE 125 MG/2ML
125 INJECTION, POWDER, LYOPHILIZED, FOR SOLUTION INTRAMUSCULAR; INTRAVENOUS
Status: CANCELLED | OUTPATIENT
Start: 2021-10-05

## 2021-08-24 RX ORDER — ALBUTEROL SULFATE 90 UG/1
1-2 AEROSOL, METERED RESPIRATORY (INHALATION)
Status: CANCELLED
Start: 2021-11-02

## 2021-08-24 RX ORDER — ALBUTEROL SULFATE 0.83 MG/ML
2.5 SOLUTION RESPIRATORY (INHALATION)
Status: CANCELLED | OUTPATIENT
Start: 2021-11-02

## 2021-08-24 RX ORDER — DIPHENHYDRAMINE HYDROCHLORIDE 50 MG/ML
50 INJECTION INTRAMUSCULAR; INTRAVENOUS
Status: CANCELLED
Start: 2021-09-07

## 2021-08-24 RX ORDER — ALBUTEROL SULFATE 0.83 MG/ML
2.5 SOLUTION RESPIRATORY (INHALATION)
Status: CANCELLED | OUTPATIENT
Start: 2021-09-07

## 2021-08-24 RX ORDER — EPINEPHRINE 1 MG/ML
0.3 INJECTION, SOLUTION, CONCENTRATE INTRAVENOUS EVERY 5 MIN PRN
Status: CANCELLED | OUTPATIENT
Start: 2021-11-02

## 2021-08-24 RX ORDER — METHYLPREDNISOLONE SODIUM SUCCINATE 125 MG/2ML
125 INJECTION, POWDER, LYOPHILIZED, FOR SOLUTION INTRAMUSCULAR; INTRAVENOUS
Status: CANCELLED | OUTPATIENT
Start: 2021-09-07

## 2021-08-24 RX ORDER — DIPHENHYDRAMINE HYDROCHLORIDE 50 MG/ML
50 INJECTION INTRAMUSCULAR; INTRAVENOUS
Status: CANCELLED
Start: 2021-10-05

## 2021-08-24 RX ORDER — SODIUM CHLORIDE 9 MG/ML
200 INJECTION, SOLUTION INTRAVENOUS CONTINUOUS PRN
Status: CANCELLED | OUTPATIENT
Start: 2021-09-07

## 2021-08-24 RX ORDER — METHYLPREDNISOLONE SODIUM SUCCINATE 125 MG/2ML
125 INJECTION, POWDER, LYOPHILIZED, FOR SOLUTION INTRAMUSCULAR; INTRAVENOUS
Status: CANCELLED | OUTPATIENT
Start: 2021-11-02

## 2021-08-24 RX ORDER — EPINEPHRINE 1 MG/ML
0.3 INJECTION, SOLUTION, CONCENTRATE INTRAVENOUS EVERY 5 MIN PRN
Status: CANCELLED | OUTPATIENT
Start: 2021-09-07

## 2021-08-24 RX ORDER — ALBUTEROL SULFATE 90 UG/1
1-2 AEROSOL, METERED RESPIRATORY (INHALATION)
Status: CANCELLED
Start: 2021-09-07

## 2021-08-24 RX ORDER — SODIUM CHLORIDE 9 MG/ML
200 INJECTION, SOLUTION INTRAVENOUS CONTINUOUS PRN
Status: CANCELLED | OUTPATIENT
Start: 2021-10-05

## 2021-09-06 ENCOUNTER — ANESTHESIA EVENT (OUTPATIENT)
Dept: PEDIATRICS | Facility: CLINIC | Age: 21
End: 2021-09-06
Payer: COMMERCIAL

## 2021-09-07 ENCOUNTER — INFUSION THERAPY VISIT (OUTPATIENT)
Dept: INFUSION THERAPY | Facility: CLINIC | Age: 21
End: 2021-09-07
Attending: PEDIATRICS
Payer: COMMERCIAL

## 2021-09-07 ENCOUNTER — OFFICE VISIT (OUTPATIENT)
Dept: PEDIATRIC HEMATOLOGY/ONCOLOGY | Facility: CLINIC | Age: 21
End: 2021-09-07
Attending: PEDIATRICS
Payer: COMMERCIAL

## 2021-09-07 ENCOUNTER — ANESTHESIA (OUTPATIENT)
Dept: PEDIATRICS | Facility: CLINIC | Age: 21
End: 2021-09-07
Payer: COMMERCIAL

## 2021-09-07 ENCOUNTER — HOSPITAL ENCOUNTER (OUTPATIENT)
Facility: CLINIC | Age: 21
Discharge: HOME | End: 2021-09-07
Attending: PEDIATRICS | Admitting: PEDIATRICS
Payer: COMMERCIAL

## 2021-09-07 VITALS
DIASTOLIC BLOOD PRESSURE: 68 MMHG | HEART RATE: 95 BPM | RESPIRATION RATE: 16 BRPM | BODY MASS INDEX: 24.1 KG/M2 | WEIGHT: 177.91 LBS | TEMPERATURE: 98.1 F | HEIGHT: 72 IN | OXYGEN SATURATION: 98 % | SYSTOLIC BLOOD PRESSURE: 113 MMHG

## 2021-09-07 VITALS
WEIGHT: 177.91 LBS | RESPIRATION RATE: 16 BRPM | SYSTOLIC BLOOD PRESSURE: 99 MMHG | BODY MASS INDEX: 23.99 KG/M2 | TEMPERATURE: 97.8 F | HEART RATE: 50 BPM | OXYGEN SATURATION: 98 % | DIASTOLIC BLOOD PRESSURE: 57 MMHG

## 2021-09-07 DIAGNOSIS — C83.50 T LYMPHOBLASTIC LYMPHOMA (H): Primary | ICD-10-CM

## 2021-09-07 LAB
ALBUMIN SERPL-MCNC: 3.7 G/DL (ref 3.4–5)
ALP SERPL-CCNC: 62 U/L (ref 40–150)
ALT SERPL W P-5'-P-CCNC: 22 U/L (ref 0–70)
ANION GAP SERPL CALCULATED.3IONS-SCNC: 3 MMOL/L (ref 3–14)
APPEARANCE CSF: CLEAR
AST SERPL W P-5'-P-CCNC: 10 U/L (ref 0–45)
BASOPHILS # BLD AUTO: 0 10E3/UL (ref 0–0.2)
BASOPHILS NFR BLD AUTO: 0 %
BILIRUB SERPL-MCNC: 0.6 MG/DL (ref 0.2–1.3)
BUN SERPL-MCNC: 14 MG/DL (ref 7–30)
CALCIUM SERPL-MCNC: 8.3 MG/DL (ref 8.5–10.1)
CHLORIDE BLD-SCNC: 108 MMOL/L (ref 94–109)
CO2 SERPL-SCNC: 29 MMOL/L (ref 20–32)
COLOR CSF: COLORLESS
CREAT SERPL-MCNC: 0.77 MG/DL (ref 0.66–1.25)
EOSINOPHIL # BLD AUTO: 0.2 10E3/UL (ref 0–0.7)
EOSINOPHIL NFR BLD AUTO: 6 %
ERYTHROCYTE [DISTWIDTH] IN BLOOD BY AUTOMATED COUNT: 14.4 % (ref 10–15)
GFR SERPL CREATININE-BSD FRML MDRD: >90 ML/MIN/1.73M2
GLUCOSE BLD-MCNC: 97 MG/DL (ref 70–99)
HCT VFR BLD AUTO: 32.1 % (ref 40–53)
HGB BLD-MCNC: 11.3 G/DL (ref 13.3–17.7)
IMM GRANULOCYTES # BLD: 0 10E3/UL
IMM GRANULOCYTES NFR BLD: 0 %
LYMPHOCYTES # BLD AUTO: 0.4 10E3/UL (ref 0.8–5.3)
LYMPHOCYTES NFR BLD AUTO: 15 %
MCH RBC QN AUTO: 36.1 PG (ref 26.5–33)
MCHC RBC AUTO-ENTMCNC: 35.2 G/DL (ref 31.5–36.5)
MCV RBC AUTO: 103 FL (ref 78–100)
MONOCYTES # BLD AUTO: 0.2 10E3/UL (ref 0–1.3)
MONOCYTES NFR BLD AUTO: 8 %
NEUTROPHILS # BLD AUTO: 1.8 10E3/UL (ref 1.6–8.3)
NEUTROPHILS NFR BLD AUTO: 71 %
NRBC # BLD AUTO: 0 10E3/UL
NRBC BLD AUTO-RTO: 0 /100
PLATELET # BLD AUTO: 174 10E3/UL (ref 150–450)
POTASSIUM BLD-SCNC: 3.8 MMOL/L (ref 3.4–5.3)
PROT SERPL-MCNC: 6 G/DL (ref 6.8–8.8)
RBC # BLD AUTO: 3.13 10E6/UL (ref 4.4–5.9)
RBC # CSF MANUAL: 0 /UL (ref 0–2)
SARS-COV-2 RNA RESP QL NAA+PROBE: NEGATIVE
SODIUM SERPL-SCNC: 140 MMOL/L (ref 133–144)
TUBE # CSF: 1
WBC # BLD AUTO: 2.6 10E3/UL (ref 4–11)
WBC # CSF MANUAL: 0 /UL (ref 0–5)

## 2021-09-07 PROCEDURE — 250N000011 HC RX IP 250 OP 636

## 2021-09-07 PROCEDURE — 250N000011 HC RX IP 250 OP 636: Performed by: NURSE PRACTITIONER

## 2021-09-07 PROCEDURE — 85004 AUTOMATED DIFF WBC COUNT: CPT | Performed by: NURSE PRACTITIONER

## 2021-09-07 PROCEDURE — 80299 QUANTITATIVE ASSAY DRUG: CPT | Performed by: NURSE PRACTITIONER

## 2021-09-07 PROCEDURE — 36591 DRAW BLOOD OFF VENOUS DEVICE: CPT | Performed by: NURSE PRACTITIONER

## 2021-09-07 PROCEDURE — 258N000003 HC RX IP 258 OP 636: Performed by: NURSE PRACTITIONER

## 2021-09-07 PROCEDURE — U0003 INFECTIOUS AGENT DETECTION BY NUCLEIC ACID (DNA OR RNA); SEVERE ACUTE RESPIRATORY SYNDROME CORONAVIRUS 2 (SARS-COV-2) (CORONAVIRUS DISEASE [COVID-19]), AMPLIFIED PROBE TECHNIQUE, MAKING USE OF HIGH THROUGHPUT TECHNOLOGIES AS DESCRIBED BY CMS-2020-01-R: HCPCS | Performed by: ANESTHESIOLOGY

## 2021-09-07 PROCEDURE — 99215 OFFICE O/P EST HI 40 MIN: CPT | Mod: 25 | Performed by: STUDENT IN AN ORGANIZED HEALTH CARE EDUCATION/TRAINING PROGRAM

## 2021-09-07 PROCEDURE — 82040 ASSAY OF SERUM ALBUMIN: CPT | Performed by: NURSE PRACTITIONER

## 2021-09-07 PROCEDURE — 96409 CHEMO IV PUSH SNGL DRUG: CPT

## 2021-09-07 PROCEDURE — 999N000141 HC STATISTIC PRE-PROCEDURE NURSING ASSESSMENT: Performed by: PEDIATRICS

## 2021-09-07 PROCEDURE — 258N000003 HC RX IP 258 OP 636: Performed by: NURSE ANESTHETIST, CERTIFIED REGISTERED

## 2021-09-07 PROCEDURE — 250N000009 HC RX 250: Performed by: NURSE ANESTHETIST, CERTIFIED REGISTERED

## 2021-09-07 PROCEDURE — 250N000009 HC RX 250

## 2021-09-07 PROCEDURE — 999N000131 HC STATISTIC POST-PROCEDURE RECOVERY CARE: Performed by: PEDIATRICS

## 2021-09-07 PROCEDURE — 96450 CHEMOTHERAPY INTO CNS: CPT | Performed by: PEDIATRICS

## 2021-09-07 PROCEDURE — 370N000017 HC ANESTHESIA TECHNICAL FEE, PER MIN: Performed by: PEDIATRICS

## 2021-09-07 PROCEDURE — 89051 BODY FLUID CELL COUNT: CPT | Performed by: NURSE PRACTITIONER

## 2021-09-07 PROCEDURE — 250N000011 HC RX IP 250 OP 636: Performed by: NURSE ANESTHETIST, CERTIFIED REGISTERED

## 2021-09-07 RX ORDER — FENTANYL CITRATE 50 UG/ML
INJECTION, SOLUTION INTRAMUSCULAR; INTRAVENOUS PRN
Status: DISCONTINUED | OUTPATIENT
Start: 2021-09-07 | End: 2021-09-07

## 2021-09-07 RX ORDER — LIDOCAINE 40 MG/G
CREAM TOPICAL
Status: DISCONTINUED | OUTPATIENT
Start: 2021-09-07 | End: 2021-09-07 | Stop reason: HOSPADM

## 2021-09-07 RX ORDER — MERCAPTOPURINE 50 MG/1
TABLET ORAL
Qty: 45 TABLET | Refills: 2 | Status: SHIPPED | OUTPATIENT
Start: 2021-09-07 | End: 2021-11-02

## 2021-09-07 RX ORDER — LIDOCAINE 40 MG/G
CREAM TOPICAL
Status: CANCELLED | OUTPATIENT
Start: 2021-09-07

## 2021-09-07 RX ORDER — HEPARIN SODIUM (PORCINE) LOCK FLUSH IV SOLN 100 UNIT/ML 100 UNIT/ML
SOLUTION INTRAVENOUS
Status: COMPLETED
Start: 2021-09-07 | End: 2021-09-07

## 2021-09-07 RX ORDER — SODIUM CHLORIDE, SODIUM LACTATE, POTASSIUM CHLORIDE, CALCIUM CHLORIDE 600; 310; 30; 20 MG/100ML; MG/100ML; MG/100ML; MG/100ML
INJECTION, SOLUTION INTRAVENOUS CONTINUOUS PRN
Status: DISCONTINUED | OUTPATIENT
Start: 2021-09-07 | End: 2021-09-07

## 2021-09-07 RX ORDER — HEPARIN SODIUM,PORCINE 10 UNIT/ML
5 VIAL (ML) INTRAVENOUS ONCE
Status: CANCELLED | OUTPATIENT
Start: 2021-09-07 | End: 2021-09-07

## 2021-09-07 RX ORDER — PREDNISONE 10 MG/1
TABLET ORAL
Qty: 40 TABLET | Refills: 2 | Status: SHIPPED | OUTPATIENT
Start: 2021-09-07 | End: 2022-01-07

## 2021-09-07 RX ORDER — ONDANSETRON 2 MG/ML
INJECTION INTRAMUSCULAR; INTRAVENOUS PRN
Status: DISCONTINUED | OUTPATIENT
Start: 2021-09-07 | End: 2021-09-07

## 2021-09-07 RX ORDER — HEPARIN SODIUM (PORCINE) LOCK FLUSH IV SOLN 100 UNIT/ML 100 UNIT/ML
3-5 SOLUTION INTRAVENOUS
Status: CANCELLED | OUTPATIENT
Start: 2021-09-07

## 2021-09-07 RX ORDER — LIDOCAINE 40 MG/G
CREAM TOPICAL
Status: COMPLETED
Start: 2021-09-07 | End: 2021-09-07

## 2021-09-07 RX ORDER — HEPARIN SODIUM,PORCINE 10 UNIT/ML
VIAL (ML) INTRAVENOUS
Status: COMPLETED
Start: 2021-09-07 | End: 2021-09-07

## 2021-09-07 RX ADMIN — SODIUM CHLORIDE, POTASSIUM CHLORIDE, SODIUM LACTATE AND CALCIUM CHLORIDE: 600; 310; 30; 20 INJECTION, SOLUTION INTRAVENOUS at 10:05

## 2021-09-07 RX ADMIN — LIDOCAINE: 40 CREAM TOPICAL at 10:21

## 2021-09-07 RX ADMIN — MIDAZOLAM 2 MG: 1 INJECTION INTRAMUSCULAR; INTRAVENOUS at 10:09

## 2021-09-07 RX ADMIN — VINCRISTINE SULFATE 2 MG: 1 INJECTION, SOLUTION INTRAVENOUS at 09:23

## 2021-09-07 RX ADMIN — ONDANSETRON 4 MG: 2 INJECTION INTRAMUSCULAR; INTRAVENOUS at 10:20

## 2021-09-07 RX ADMIN — HEPARIN 500 UNITS: 100 SYRINGE at 11:31

## 2021-09-07 RX ADMIN — DEXMEDETOMIDINE 20 MCG: 100 INJECTION, SOLUTION, CONCENTRATE INTRAVENOUS at 10:09

## 2021-09-07 RX ADMIN — MIDAZOLAM 2 MG: 1 INJECTION INTRAMUSCULAR; INTRAVENOUS at 10:05

## 2021-09-07 RX ADMIN — FENTANYL CITRATE 50 MCG: 50 INJECTION, SOLUTION INTRAMUSCULAR; INTRAVENOUS at 10:05

## 2021-09-07 RX ADMIN — METHOTREXATE: 25 INJECTION, SOLUTION INTRA-ARTERIAL; INTRAMUSCULAR; INTRATHECAL; INTRAVENOUS at 10:11

## 2021-09-07 RX ADMIN — HEPARIN, PORCINE (PF) 10 UNIT/ML INTRAVENOUS SYRINGE 50 UNITS: at 09:25

## 2021-09-07 ASSESSMENT — MIFFLIN-ST. JEOR: SCORE: 1843.25

## 2021-09-07 NOTE — ANESTHESIA POSTPROCEDURE EVALUATION
Patient: Lazaro Lund    Procedure(s):  Lumbar puncture with intrathecal Chemotherapy (not CD)    Diagnosis:Lymphoma (H) [C85.90]  Diagnosis Additional Information: No value filed.    Anesthesia Type:  No value filed.    Note:  Disposition: Outpatient   Postop Pain Control: Uneventful            Sign Out: Well controlled pain   PONV: No   Neuro/Psych: Uneventful            Sign Out: Acceptable/Baseline neuro status   Airway/Respiratory: Uneventful            Sign Out: Acceptable/Baseline resp. status   CV/Hemodynamics: Uneventful            Sign Out: Acceptable CV status; No obvious hypovolemia; No obvious fluid overload   Other NRE: NONE   DID A NON-ROUTINE EVENT OCCUR? No    Event details/Postop Comments:  I personally evaluated the patient at bedside. No anesthesia-related complications noted. Patient is hemodynamically stable with adequate control of pain and nausea. Ready for discharge from PACU.     Jia Angeles MD  Pediatric Anesthesiologist  [phone]           Last vitals:  Vitals Value Taken Time   /59 09/07/21 1030   Temp 36.5  C (97.7  F) 09/07/21 1030   Pulse 51 09/07/21 1031   Resp 14 09/07/21 1031   SpO2 100 % 09/07/21 1031   Vitals shown include unvalidated device data.    Electronically Signed By: Jia Angeles MD  September 7, 2021  11:43 AM

## 2021-09-07 NOTE — PROGRESS NOTES
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 Lazaro presented with acute onset cough and was found to have an anterior mediastinal mass and malignant left-sided pleural effusion.  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  He was admitted to [facility] oncology service 8/30 and started on treatment per [id number].  He had a pleural effusion that required a chest tube and a pericardial effusion that was not drained. His Induction was complicated by cardiac compression secondary to his mass leading to tamponade, this improved through out his hospital stay.  He also had dysphagia that improved with treatment of his mass, swallow study was normal. His course was complicated by extensive bilateral UE DVTs, and he was successfully treated with anticoagulation. His consolidation course was complicated by the development of severe asparaginase induced necrotizing pancreatitis. He became quite ill with SIRS and associated hypotension, he required pressor support in the ICU. As a result, asparaginase was eliminated from his treatment plan. He was in CR status at end of consolidation. During maintenance, he developed hepatotoxicity so allopurinol and dose reduced 6MP were started for correction of skewed 6-MP metabolism.     HPI:   Since his last visit Lazaro has done very well.  He reports good appetite and energy. He has not had nausea, vomiting, constipation nor diarrhea. He has not had any intermittent infections. Continues to deliberate on the COVID vaccine so continue to encourage scheduling an appointment and discussed the addition of the booster recommendation for patients in his situation. No bruising, bleeding, swollen glands; no neurologic symptoms; no new skin concerns. He denies missing doses of his oral chemotherapy. He denies pain.    Review of systems:  The 10 point  Review of Systems is negative other than noted in the HPI or here.      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 Former daily marajuana smoker, now only uses occasionally    PFMH:   Family History   Problem Relation Age of Onset     No Known Problems Mother      No Known Problems Father      Asthma Brother      Thyroid Cancer Paternal Grandmother      Melanoma Paternal Aunt        Social History:   Social History     Social History Narrative    Lazaro previously lived at home with his dad and step mom in [place] but is now staying with his grandma in [place]. Biological mother is involved in his life. Has 4 step-siblings and 1 biological sibling. 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 Has completed high school. 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  He has been enjoying fishing and video games.        Current Medications:  Current Outpatient Medications on File Prior to Visit   Medication Sig Dispense Refill     allopurinol (ZYLOPRIM) 100 MG tablet Take 1 tablet (100 mg) by mouth daily 30 tablet 11     diphenhydrAMINE (BENADRYL) 25 MG capsule Take 1-2 capsules (25-50 mg) by mouth every 6 hours as needed (Breakthrough Nausea and  "Vomiting ) 30 capsule 1     lidocaine-prilocaine (EMLA) 2.5-2.5 % external cream Apply topically as needed for other (prior to port access) 30 g 6     magic mouthwash suspension (diphenhydrAMINE, lidocaine, aluminum-magnesium & simethicone) Swish and spit 10 mLs in mouth 4 times daily (with meals and nightly) 120 mL 2     mercaptopurine (PURINETHOL) 50 MG tablet Take 75mg (1.5 tablets) daily 45 tablet 2     methotrexate 2.5 MG tablet Take 16 tablets (40 mg) by mouth once a week Do not take on Days of LP. 64 tablet 2     ondansetron (ZOFRAN-ODT) 8 MG ODT tab Take 1 tablet (8 mg) by mouth every 8 hours as needed for nausea 20 tablet 6     pantoprazole (PROTONIX) 40 MG EC tablet Take 1 tablet (40 mg) by mouth every morning (before breakfast) During weeks of taking prednisone. 30 tablet 2     polyethylene glycol (MIRALAX/GLYCOLAX) packet Take 17 g by mouth daily 30 packet 0     predniSONE (DELTASONE) 10 MG tablet Take 40mg (4 tabs) twice dialy for 5 days every 4 weeks. 40 tablet 2     sennosides (SENOKOT) 8.6 MG tablet Take 2 tablets by mouth 2 times daily as needed for constipation 60 each 1     sulfamethoxazole-trimethoprim (BACTRIM DS) 800-160 MG tablet Take 1 tablet by mouth Every Mon, Tues two times daily 16 tablet 11     Vitamin D, Cholecalciferol, 25 MCG (1000 UT) TABS Take 1 tablet (1,000 Units) by mouth daily 30 tablet 3     Reviewed medications with Lazaro. Confirmed oral chemo doses, and notes no missed doses. He is not taking Vitamin D currently.  Received [id number] influenza vaccine on 12/22/20     Physical Exam:   Temp:  [98.1  F (36.7  C)] 98.1  F (36.7  C)  Pulse:  [95] 95  Resp:  [16] 16  BP: (113)/(68) 113/68  SpO2:  [98 %] 98 %  Wt Readings from Last 4 Encounters:   09/07/21 80.7 kg (177 lb 14.6 oz)   08/10/21 78.3 kg (172 lb 9.9 oz)   07/13/21 77.6 kg (171 lb 1.2 oz)   06/15/21 75.5 kg (166 lb 7.2 oz)     Ht Readings from Last 2 Encounters:   09/07/21 1.834 m (6' 0.21\")   08/10/21 1.85 m (6' 0.84\") "     General: Lazaro is alert, interactive and appropriate, well-appearing, in no distress  HEENT: Skull is atrauamatic and normocephalic.  Full head of hair. PERRLA, sclera are non icteric and not injected, EOM are intact. Nares are patent without drainage. Oropharynx clear, no plaques noted. No mucositis. MMM.  Neck:  Supple without lymphadenopathy.   Lymph: There is no cervical, supraclavicular, axillary, lymphadenopathy palpated.  Cardiovascular:  HR is regular, S1, S2 no murmur.  Capillary refill is < 2 seconds.   Respiratory: No cough noted. Respirations are easy. Lungs are clear to auscultation throughout.  No crackles or wheezes.  Gastrointestinal: BS present in all quadrants.  Abdomen is soft and flat.  No pain with palpation. No hepatosplenomegaly or masses are palpated.  Skin: No concerning lesions. Few scattered erythematous papules on his arms.  Neurological:  CN 2-12 grossly intact. Gait is normal.  No issues with balance. Sensation intact in hands and feet.     Musculoskeletal: Good strength and ROM in all extremities.  Strong dorsiflexion at ankles and great toes (5/5) bilaterally without any pain at the Achilles.     Labs:   The following tests were ordered and interpreted by me today:  -- CBC with differential  -- CMP  -- CSF with differential and cytospin  -- Thiopurine metabolites    Results for orders placed or performed during the hospital encounter of 09/07/21   Asymptomatic COVID-19 Virus (Coronavirus) by PCR Nose     Status: Normal    Specimen: Nose; Swab   Result Value Ref Range    SARS CoV2 PCR Negative Negative    Narrative    Testing was performed using the vero  SARS-CoV-2 & Influenza A/B Assay on the vero  [employer]  System.  This test should be ordered for the detection of SARS-COV-2 in individuals who meet SARS-CoV-2 clinical and/or epidemiological criteria. Test performance is unknown in asymptomatic patients.  This test is for in vitro diagnostic use under the FDA EUA for laboratories  certified under CLIA to perform moderate and/or high complexity testing. This test has not been FDA cleared or approved.  A negative test does not rule out the presence of PCR inhibitors in the specimen or target RNA in concentration below the limit of detection for the assay. The possibility of a false negative should be considered if the patient's recent exposure or clinical presentation suggests COVID-19.  [facility] are certified under the Clinical Laboratory Improvement Amendments of 1988 (CLIA-88) as qualified to perform moderate and/or high complexity laboratory testing.   Cell Count CSF     Status: None   Result Value Ref Range    Tube Number 1     Color Colorless Colorless    Clarity Clear Clear    Total Nucleated Cells 0 0 - 5 /uL    RBC Count 0 0 - 2 /uL   Differential CSF     Status: None (Preliminary result)   Result Value Ref Range    % Neutrophils      % Lymphocytes      % Monocytes/Macrophages      Narrative    No reference ranges have been established.  This result   should be interpreted in the context of the patient's clinical condition and   compared to simultaneous measurement in the patient's blood.  Sent for Review by Pathologist. Review comments will be entered. Results will be updated after review as applicable.     CSF Cell Count with Differential: *Canceled*     Status: None ()    Narrative    The following orders were created for panel order CSF Cell Count with Differential:.  Procedure                               Abnormality         Status                     ---------                               -----------         ------                       Please view results for these tests on the individual orders.   CSF Cell Count with Differential:     Status: None (In process)    Narrative    The following orders were created for panel order CSF Cell Count with Differential:.  Procedure                               Abnormality         Status                      ---------                               -----------         ------                     Cell Count CSF[[id number]]                                   Final result               Differential CSF[[id number]]                                 Preliminary result           Please view results for these tests on the individual orders.   Results for orders placed or performed in visit on 09/07/21   Comprehensive metabolic panel     Status: Abnormal   Result Value Ref Range    Sodium 140 133 - 144 mmol/L    Potassium 3.8 3.4 - 5.3 mmol/L    Chloride 108 94 - 109 mmol/L    Carbon Dioxide (CO2) 29 20 - 32 mmol/L    Anion Gap 3 3 - 14 mmol/L    Urea Nitrogen 14 7 - 30 mg/dL    Creatinine 0.77 0.66 - 1.25 mg/dL    Calcium 8.3 (L) 8.5 - 10.1 mg/dL    Glucose 97 70 - 99 mg/dL    Alkaline Phosphatase 62 40 - 150 U/L    AST 10 0 - 45 U/L    ALT 22 0 - 70 U/L    Protein Total 6.0 (L) 6.8 - 8.8 g/dL    Albumin 3.7 3.4 - 5.0 g/dL    Bilirubin Total 0.6 0.2 - 1.3 mg/dL    GFR Estimate >90 >60 mL/min/1.73m2   CBC with platelets differential     Status: Abnormal    Narrative    The following orders were created for panel order CBC with platelets differential.  Procedure                               Abnormality         Status                     ---------                               -----------         ------                     CBC with platelets and d...[[id number]]  Abnormal            Final result                 Please view results for these tests on the individual orders.   CBC with platelets and differential     Status: Abnormal   Result Value Ref Range    WBC Count 2.6 (L) 4.0 - 11.0 10e3/uL    RBC Count 3.13 (L) 4.40 - 5.90 10e6/uL    Hemoglobin 11.3 (L) 13.3 - 17.7 g/dL    Hematocrit 32.1 (L) 40.0 - 53.0 %     (H) 78 - 100 fL    MCH 36.1 (H) 26.5 - 33.0 pg    MCHC 35.2 31.5 - 36.5 g/dL    RDW 14.4 10.0 - 15.0 %    Platelet Count 174 150 - 450 10e3/uL    % Neutrophils 71 %    % Lymphocytes 15 %    % Monocytes 8 %    %  Eosinophils 6 %    % Basophils 0 %    % Immature Granulocytes 0 %    NRBCs per 100 WBC 0 <1 /100    Absolute Neutrophils 1.8 1.6 - 8.3 10e3/uL    Absolute Lymphocytes 0.4 (L) 0.8 - 5.3 10e3/uL    Absolute Monocytes 0.2 0.0 - 1.3 10e3/uL    Absolute Eosinophils 0.2 0.0 - 0.7 10e3/uL    Absolute Basophils 0.0 0.0 - 0.2 10e3/uL    Absolute Immature Granulocytes 0.0 <=0.0 10e3/uL    Absolute NRBCs 0.0 10e3/uL       =========================================================  9/7 Therapeutic Lumbar Puncture  A Lumbar Puncture was performed in the Pediatric Sedation Suite. Informed consent was obtained prior to the procedure. Lazaro Lund was identified by facial recognition and ID arm band. A time-out was performed. Lazaro Lund was then placed in the left lateral decubitus position and the lumbosacral area was sterily prepped using Chloraprep followed by drape placement. Anatomic landmarks were identified by palpation. Then, a 22 gauge, 3.5 inch linda needle was easily inserted into the L3-L4 interspace. On the first attempt approximately 3 mL of clear and colorless cerebrospinal fluid was obtained to be sent to the lab for cell count analysis and cytospin. Following that, 15mg of intrathecal Methotrexate in 6 mL of preservative-free normal saline was infused without resistance. The needle was removed and a Band-Aid applied. Lazaro Lund tolerated this procedure very well.    Signed,  Nicho Fischer   Pediatric Hematology/Oncology Fellow    Physician Attestation    I was present during the entire procedure. I saw and evaluated the patient. I discussed with the fellow and agree with the findings documented in the fellow's procedure note.     Reynaldo Campuzano MD  =========================================================      Assessment:  Lazaro Lund is a 21 year old young man with T cell lymphoblastic lymphoma (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  He is being treated per [facility] protocol [id number]. He's had a CRu following  Induction with a CR at the end of Consolidation. His course was complicated by extensive bilateral DVT and severe necrotizing pancreatitis requiring cessation of PEG-asparaginase from his treatment.  He comes to clinic today for Day 1 of Cycle 7 of Maintenance therapy. He is currently on 50% full dose of 6MP with allopurinol for correction of skewed 6-MP metabolism and 100% methotrexate. ANC has now been persistently elevated above target.    Plan:   1) Labs reviewed with Lazaro. Continue 6MP to 50%= 75mg daily, increase methotrexate to 125% full dose= 50 mg (20 tabs). Continue allopurinol.  2) Start prednisone burst today, continue protonix during bursts.  3) IV vincristine in clinic today.  4) Continue on recommend that he take Vit D 1000 IU daily.  5) Bactrim for PCP prophylaxis.   6) Continue to check TGNs monthly moving forward.  7) Lazaro will need COVID testing prior to LPs moving forward.  He prefers to come the day of, have it done in sedation and then come to clinic for chemo while he is waiting for results.   8) Previously discussed COVID vaccine in detail.  Lazaro is willing to get vaccinated but is always deterred by the timing of his steroids. Encouraged him to schedule his first dose next Monday and then stick to the recommended scheduling for subsequent dosing. Now that a booster is approved for immunocompromised patients, this should help improve the efficacy of the vaccine series in the setting of receiving immunosuppressive chemotherapy.  9) Miralax PRN, titrate to produce one soft stool daily.  10) RTC in 4 weeks for Day 29 of cycle 7. Will coordinate port removal with cycle 8 lumbar puncture    Signed,  Nicho Fischer   Pediatric Hematology/Oncology Fellow    Physician Attestation    I saw and evaluated the patient. I discussed with the fellow and agree with the findings and plan as documented in the fellow's note.     Reynaldo Campuzano MD  Pediatric Hematology/Oncology  [facility]  [facility]    Total time spent on the following services on the date of the encounter:  -- Ordering medications, test, procedures, chemotherapy  -- Interpretation of labs,  -- Performing a medically appropriate examination  -- Counseling and educating the patient/family/caregiver  -- Documenting clinical information in the electronic health record  -- Communicating results to the patient/family/caregiver  -- Total time spent: 50 minutes

## 2021-09-07 NOTE — ANESTHESIA CARE TRANSFER NOTE
Patient: Lazaro Lund    Procedure(s):  Lumbar puncture with intrathecal Chemotherapy (not CD)    Diagnosis: Lymphoma (H) [C85.90]  Diagnosis Additional Information: No value filed.    Anesthesia Type:   No value filed.     Note:    Oropharynx: oropharynx clear of all foreign objects and spontaneously breathing  Level of Consciousness: drowsy  Oxygen Supplementation: nasal cannula  Level of Supplemental Oxygen (L/min / FiO2): 2 L  Independent Airway: airway patency satisfactory and stable  Dentition: dentition unchanged  Vital Signs Stable: post-procedure vital signs reviewed and stable  Report to RN Given: handoff report given  Patient transferred to:  Recovery    Handoff Report: Identifed the Patient, Identified the Reponsible Provider, Reviewed the pertinent medical history, Discussed the surgical course, Reviewed Intra-OP anesthesia mangement and issues during anesthesia, Set expectations for post-procedure period and Allowed opportunity for questions and acknowledgement of understanding      Vitals:  Vitals Value Taken Time   BP     Temp     Pulse     Resp     SpO2         Electronically Signed By: Ashley M. Mulvihill, APRN CRNA  September 7, 2021  10:26 AM

## 2021-09-07 NOTE — PROGRESS NOTES
Infusion Nursing Note    Lazaro PLUNKETT Ashely Presents to [facility] today for: Vincristine prior to LP.    Due to : T lymphoblastic lymphoma (H)    Intravenous Access/Labs: Port accessed using sterile technique. Labs drawn per orders.     Coping:   Child Family Life declined    Infusion Note: Vincristine given to gravity. Blood return noted pre/mid/post infusion. Port heparin locked with 10 unit/mL heparin. LMX cream applied to lowe spine for LP. Report called to sedation. Pt should have discharge meds provided while in sedation.     Discharge Plan:   Patient verbalized understanding of discharge instructions.   Pt left [facility] in stable condition.

## 2021-09-07 NOTE — DISCHARGE INSTRUCTIONS
* Recovery After Conscious Sedation (Adult)  We gave you Precedex, Midazolam, and Fentanyl to make you sleepy or relaxed during your procedure. This may have included both a pain medicine and sleeping medicine. Most of the effects have worn off. But you may still feel sleepy for the next 6 to 8 hours.  Home care  Follow these guidelines when you get home:  You may feel sleepy and clumsy and have poor balance for the next few hours.  A responsible adult should stay with you for the next 8 hours. This person should make sure your condition doesn t get worse.  Don't drink any alcohol for the next 24 hours.  Don't drive, operate dangerous machinery, make important business or personal decisions or sign legal documents during the next 24 hours.  You may vomit (throw up) if you eat too soon after the procedure. If this happens, drink small amounts of water, juice or clear broth. Wait to try solid food until you no longer have nausea (upset stomach).  Note: Your care team may tell you not to take any medicine by mouth for pain or sleep in the next 4 hours. These medicines may react with the medicines you had in the hospital. This could cause a much stronger response than usual.  Follow-up care  Follow up with your care team if you are not alert and back to your usual level of activity within 12 hours.  When to seek medical advice  Call your care team right away if any of these occur:  You still feel sleepy or clumsy after 12 hours, or your sleepiness gets worse  Weakness or dizziness gets worse  Repeated vomiting  If you can't be woken up and someone is staying with you, they should call 911.  Date Last Reviewed: 10/18/2016    [id number] [employer]. [address] [place] [address], [place] [address]. All rights reserved. This information is not intended as a substitute for professional medical care. Always follow your healthcare professional's instructions.  This information has been modified by your health care  provider with permission from the publisher.        [facility]   [phone]    Care post Lumbar Puncture     Do not remove bandage/dressing for 24 hours -- after this time they can be removed    No bath, shower or soaking of the dressing for 24 hours    Activity as tolerated by the patient    Diet as able to tolerated    May use Tylenol as needed for pain control -- DO NOT use Ibuprofen    Can apply icepack to the site for discomfort -- no more than 10 minutes at a time    If bleeding presents apply pressure for 5 minutes    Call [phone] ask for Peds BMT/Hem/Onc fellow on call if complications arise including:    persistent bleeding    fever greater than 100.5    Pain    Lumbar punctures can cause headache. If the pain is not controlled with Tylenol (acetaminophen) please call the Peds BMT/Hem/Onc fellow on call

## 2021-09-07 NOTE — LETTER
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 Lazaro presented with acute onset cough and was found to have an anterior mediastinal mass and malignant left-sided pleural effusion.  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  He was admitted to [facility] oncology service 8/30 and started on treatment per [id number].  He had a pleural effusion that required a chest tube and a pericardial effusion that was not drained. His Induction was complicated by cardiac compression secondary to his mass leading to tamponade, this improved through out his hospital stay.  He also had dysphagia that improved with treatment of his mass, swallow study was normal. His course was complicated by extensive bilateral UE DVTs, and he was successfully treated with anticoagulation. His consolidation course was complicated by the development of severe asparaginase induced necrotizing pancreatitis. He became quite ill with SIRS and associated hypotension, he required pressor support in the ICU. As a result, asparaginase was eliminated from his treatment plan. He was in CR status at end of consolidation. During maintenance, he developed hepatotoxicity so allopurinol and dose reduced 6MP were started for correction of skewed 6-MP metabolism.     HPI:   Since his last visit Lazaro has done very well.  He reports good appetite and energy. He has not had nausea, vomiting, constipation nor diarrhea. He has not had any intermittent infections. Continues to deliberate on the COVID vaccine so continue to encourage scheduling an appointment and discussed the addition of the booster recommendation for patients in his situation. No bruising, bleeding, swollen glands; no neurologic symptoms; no new skin concerns. He denies missing  doses of his oral chemotherapy. He denies pain.    Review of systems:  The 10 point Review of Systems is negative other than noted in the HPI or here.      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 Former daily marajuana smoker, now only uses occasionally    PFMH:   Family History   Problem Relation Age of Onset     No Known Problems Mother      No Known Problems Father      Asthma Brother      Thyroid Cancer Paternal Grandmother      Melanoma Paternal Aunt        Social History:   Social History     Social History Narrative    Lazaro previously lived at home with his dad and step mom in [place] but is now staying with his grandma in [place]. Biological mother is involved in his life. Has 4 step-siblings and 1 biological sibling. 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 Has completed high school. 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  He has been enjoying fishing and video games.        Current Medications:  Current Outpatient Medications on File Prior to Visit   Medication Sig Dispense Refill     allopurinol (ZYLOPRIM) 100 MG tablet Take 1 tablet (100 mg) by mouth daily 30 tablet 11     diphenhydrAMINE (BENADRYL) 25 MG capsule Take 1-2  capsules (25-50 mg) by mouth every 6 hours as needed (Breakthrough Nausea and Vomiting ) 30 capsule 1     lidocaine-prilocaine (EMLA) 2.5-2.5 % external cream Apply topically as needed for other (prior to port access) 30 g 6     magic mouthwash suspension (diphenhydrAMINE, lidocaine, aluminum-magnesium & simethicone) Swish and spit 10 mLs in mouth 4 times daily (with meals and nightly) 120 mL 2     mercaptopurine (PURINETHOL) 50 MG tablet Take 75mg (1.5 tablets) daily 45 tablet 2     methotrexate 2.5 MG tablet Take 16 tablets (40 mg) by mouth once a week Do not take on Days of LP. 64 tablet 2     ondansetron (ZOFRAN-ODT) 8 MG ODT tab Take 1 tablet (8 mg) by mouth every 8 hours as needed for nausea 20 tablet 6     pantoprazole (PROTONIX) 40 MG EC tablet Take 1 tablet (40 mg) by mouth every morning (before breakfast) During weeks of taking prednisone. 30 tablet 2     polyethylene glycol (MIRALAX/GLYCOLAX) packet Take 17 g by mouth daily 30 packet 0     predniSONE (DELTASONE) 10 MG tablet Take 40mg (4 tabs) twice dialy for 5 days every 4 weeks. 40 tablet 2     sennosides (SENOKOT) 8.6 MG tablet Take 2 tablets by mouth 2 times daily as needed for constipation 60 each 1     sulfamethoxazole-trimethoprim (BACTRIM DS) 800-160 MG tablet Take 1 tablet by mouth Every Mon, Tues two times daily 16 tablet 11     Vitamin D, Cholecalciferol, 25 MCG (1000 UT) TABS Take 1 tablet (1,000 Units) by mouth daily 30 tablet 3     Reviewed medications with Lazaro. Confirmed oral chemo doses, and notes no missed doses. He is not taking Vitamin D currently.  Received [id number] influenza vaccine on 12/22/20     Physical Exam:   Temp:  [98.1  F (36.7  C)] 98.1  F (36.7  C)  Pulse:  [95] 95  Resp:  [16] 16  BP: (113)/(68) 113/68  SpO2:  [98 %] 98 %  Wt Readings from Last 4 Encounters:   09/07/21 80.7 kg (177 lb 14.6 oz)   08/10/21 78.3 kg (172 lb 9.9 oz)   07/13/21 77.6 kg (171 lb 1.2 oz)   06/15/21 75.5 kg (166 lb 7.2 oz)     Ht Readings from  "Last 2 Encounters:   09/07/21 1.834 m (6' 0.21\")   08/10/21 1.85 m (6' 0.84\")     General: Lazaro is alert, interactive and appropriate, well-appearing, in no distress  HEENT: Skull is atrauamatic and normocephalic.  Full head of hair. PERRLA, sclera are non icteric and not injected, EOM are intact. Nares are patent without drainage. Oropharynx clear, no plaques noted. No mucositis. MMM.  Neck:  Supple without lymphadenopathy.   Lymph: There is no cervical, supraclavicular, axillary, lymphadenopathy palpated.  Cardiovascular:  HR is regular, S1, S2 no murmur.  Capillary refill is < 2 seconds.   Respiratory: No cough noted. Respirations are easy. Lungs are clear to auscultation throughout.  No crackles or wheezes.  Gastrointestinal: BS present in all quadrants.  Abdomen is soft and flat.  No pain with palpation. No hepatosplenomegaly or masses are palpated.  Skin: No concerning lesions. Few scattered erythematous papules on his arms.  Neurological:  CN 2-12 grossly intact. Gait is normal.  No issues with balance. Sensation intact in hands and feet.     Musculoskeletal: Good strength and ROM in all extremities.  Strong dorsiflexion at ankles and great toes (5/5) bilaterally without any pain at the Achilles.     Labs:   The following tests were ordered and interpreted by me today:  -- CBC with differential  -- CMP  -- CSF with differential and cytospin  -- Thiopurine metabolites    Results for orders placed or performed during the hospital encounter of 09/07/21   Asymptomatic COVID-19 Virus (Coronavirus) by PCR Nose     Status: Normal    Specimen: Nose; Swab   Result Value Ref Range    SARS CoV2 PCR Negative Negative    Narrative    Testing was performed using the vero  SARS-CoV-2 & Influenza A/B Assay on the vero  [employer]  System.  This test should be ordered for the detection of SARS-COV-2 in individuals who meet SARS-CoV-2 clinical and/or epidemiological criteria. Test performance is unknown in asymptomatic patients. "  This test is for in vitro diagnostic use under the FDA EUA for laboratories certified under CLIA to perform moderate and/or high complexity testing. This test has not been FDA cleared or approved.  A negative test does not rule out the presence of PCR inhibitors in the specimen or target RNA in concentration below the limit of detection for the assay. The possibility of a false negative should be considered if the patient's recent exposure or clinical presentation suggests COVID-19.  [facility] are certified under the Clinical Laboratory Improvement Amendments of 1988 (CLIA-88) as qualified to perform moderate and/or high complexity laboratory testing.   Cell Count CSF     Status: None   Result Value Ref Range    Tube Number 1     Color Colorless Colorless    Clarity Clear Clear    Total Nucleated Cells 0 0 - 5 /uL    RBC Count 0 0 - 2 /uL   Differential CSF     Status: None (Preliminary result)   Result Value Ref Range    % Neutrophils      % Lymphocytes      % Monocytes/Macrophages      Narrative    No reference ranges have been established.  This result   should be interpreted in the context of the patient's clinical condition and   compared to simultaneous measurement in the patient's blood.  Sent for Review by Pathologist. Review comments will be entered. Results will be updated after review as applicable.     CSF Cell Count with Differential: *Canceled*     Status: None ()    Narrative    The following orders were created for panel order CSF Cell Count with Differential:.  Procedure                               Abnormality         Status                     ---------                               -----------         ------                       Please view results for these tests on the individual orders.   CSF Cell Count with Differential:     Status: None (In process)    Narrative    The following orders were created for panel order CSF Cell Count with Differential:.  Procedure                                Abnormality         Status                     ---------                               -----------         ------                     Cell Count CSF[[id number]]                                   Final result               Differential CSF[[id number]]                                 Preliminary result           Please view results for these tests on the individual orders.   Results for orders placed or performed in visit on 09/07/21   Comprehensive metabolic panel     Status: Abnormal   Result Value Ref Range    Sodium 140 133 - 144 mmol/L    Potassium 3.8 3.4 - 5.3 mmol/L    Chloride 108 94 - 109 mmol/L    Carbon Dioxide (CO2) 29 20 - 32 mmol/L    Anion Gap 3 3 - 14 mmol/L    Urea Nitrogen 14 7 - 30 mg/dL    Creatinine 0.77 0.66 - 1.25 mg/dL    Calcium 8.3 (L) 8.5 - 10.1 mg/dL    Glucose 97 70 - 99 mg/dL    Alkaline Phosphatase 62 40 - 150 U/L    AST 10 0 - 45 U/L    ALT 22 0 - 70 U/L    Protein Total 6.0 (L) 6.8 - 8.8 g/dL    Albumin 3.7 3.4 - 5.0 g/dL    Bilirubin Total 0.6 0.2 - 1.3 mg/dL    GFR Estimate >90 >60 mL/min/1.73m2   CBC with platelets differential     Status: Abnormal    Narrative    The following orders were created for panel order CBC with platelets differential.  Procedure                               Abnormality         Status                     ---------                               -----------         ------                     CBC with platelets and d...[[id number]]  Abnormal            Final result                 Please view results for these tests on the individual orders.   CBC with platelets and differential     Status: Abnormal   Result Value Ref Range    WBC Count 2.6 (L) 4.0 - 11.0 10e3/uL    RBC Count 3.13 (L) 4.40 - 5.90 10e6/uL    Hemoglobin 11.3 (L) 13.3 - 17.7 g/dL    Hematocrit 32.1 (L) 40.0 - 53.0 %     (H) 78 - 100 fL    MCH 36.1 (H) 26.5 - 33.0 pg    MCHC 35.2 31.5 - 36.5 g/dL    RDW 14.4 10.0 - 15.0 %    Platelet Count 174 150 - 450 10e3/uL    %  Neutrophils 71 %    % Lymphocytes 15 %    % Monocytes 8 %    % Eosinophils 6 %    % Basophils 0 %    % Immature Granulocytes 0 %    NRBCs per 100 WBC 0 <1 /100    Absolute Neutrophils 1.8 1.6 - 8.3 10e3/uL    Absolute Lymphocytes 0.4 (L) 0.8 - 5.3 10e3/uL    Absolute Monocytes 0.2 0.0 - 1.3 10e3/uL    Absolute Eosinophils 0.2 0.0 - 0.7 10e3/uL    Absolute Basophils 0.0 0.0 - 0.2 10e3/uL    Absolute Immature Granulocytes 0.0 <=0.0 10e3/uL    Absolute NRBCs 0.0 10e3/uL       =========================================================  9/7 Therapeutic Lumbar Puncture  A Lumbar Puncture was performed in the Pediatric Sedation Suite. Informed consent was obtained prior to the procedure. Lazaro Lund was identified by facial recognition and ID arm band. A time-out was performed. Lazaro Lund was then placed in the left lateral decubitus position and the lumbosacral area was sterily prepped using Chloraprep followed by drape placement. Anatomic landmarks were identified by palpation. Then, a 22 gauge, 3.5 inch linda needle was easily inserted into the L3-L4 interspace. On the first attempt approximately 3 mL of clear and colorless cerebrospinal fluid was obtained to be sent to the lab for cell count analysis and cytospin. Following that, 15mg of intrathecal Methotrexate in 6 mL of preservative-free normal saline was infused without resistance. The needle was removed and a Band-Aid applied. Lazaro Lund tolerated this procedure very well.    Signed,  Nicho Fischer   Pediatric Hematology/Oncology Fellow    Physician Attestation    I was present during the entire procedure. I saw and evaluated the patient. I discussed with the fellow and agree with the findings documented in the fellow's procedure note.     Reynaldo Campuzano MD  =========================================================      Assessment:  Lazaro Lund is a 21 year old young man with T cell lymphoblastic lymphoma (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  He is  being treated per [facility] protocol [id number]. He's had a CRu following Induction with a CR at the end of Consolidation. His course was complicated by extensive bilateral DVT and severe necrotizing pancreatitis requiring cessation of PEG-asparaginase from his treatment.  He comes to clinic today for Day 1 of Cycle 7 of Maintenance therapy. He is currently on 50% full dose of 6MP with allopurinol for correction of skewed 6-MP metabolism and 100% methotrexate. ANC has now been persistently elevated above target.    Plan:   1) Labs reviewed with Lazaro. Continue 6MP to 50%= 75mg daily, increase methotrexate to 125% full dose= 50 mg (20 tabs). Continue allopurinol.  2) Start prednisone burst today, continue protonix during bursts.  3) IV vincristine in clinic today.  4) Continue on recommend that he take Vit D 1000 IU daily.  5) Bactrim for PCP prophylaxis.   6) Continue to check TGNs monthly moving forward.  7) Lazaro will need COVID testing prior to LPs moving forward.  He prefers to come the day of, have it done in sedation and then come to clinic for chemo while he is waiting for results.   8) Previously discussed COVID vaccine in detail.  Lazaro is willing to get vaccinated but is always deterred by the timing of his steroids. Encouraged him to schedule his first dose next Monday and then stick to the recommended scheduling for subsequent dosing. Now that a booster is approved for immunocompromised patients, this should help improve the efficacy of the vaccine series in the setting of receiving immunosuppressive chemotherapy.  9) Miralax PRN, titrate to produce one soft stool daily.  10) RTC in 4 weeks for Day 29 of cycle 7. Will coordinate port removal with cycle 8 lumbar puncture    Signed,  Nicho Fischer   Pediatric Hematology/Oncology Fellow    Physician Attestation    I saw and evaluated the patient. I discussed with the fellow and agree with the findings and plan as documented in the fellow's note.     Reynaldo  MD Justen  Pediatric Hematology/Oncology  [facility]    Total time spent on the following services on the date of the encounter:  -- Ordering medications, test, procedures, chemotherapy  -- Interpretation of labs,  -- Performing a medically appropriate examination  -- Counseling and educating the patient/family/caregiver  -- Documenting clinical information in the electronic health record  -- Communicating results to the patient/family/caregiver  -- Total time spent: 50 minutes      Nicho Fischer MD

## 2021-09-07 NOTE — ANESTHESIA PREPROCEDURE EVALUATION
Anesthesia Pre-Procedure Evaluation    Patient: Lazaro Lund   MRN:     [id number] Gender:   male   Age:    21 year old :      2000        Preoperative Diagnosis: Lymphoma (H) [C85.90]   Procedure(s):  Lumbar puncture with intrathecal Chemotherapy (not CD)     LABS:  CBC:   Lab Results   Component Value Date    WBC 2.6 (L) 2021    WBC 2.3 (L) 08/10/2021    HGB 11.3 (L) 2021    HGB 11.8 (L) 08/10/2021    HCT 32.1 (L) 2021    HCT 33.5 (L) 08/10/2021     2021     08/10/2021     BMP:   Lab Results   Component Value Date     2021     06/15/2021    POTASSIUM 3.8 2021    POTASSIUM 4.1 06/15/2021    CHLORIDE 108 2021    CHLORIDE 110 (H) 06/15/2021    CO2 29 2021    CO2 26 06/15/2021    BUN 14 2021    BUN 17 06/15/2021    CR 0.77 2021    CR 0.72 06/15/2021    GLC 97 2021    GLC 88 06/15/2021     COAGS:   Lab Results   Component Value Date    PTT 45 (H) 2019    INR 1.09 2019    FIBR 293 2019     POC:   Lab Results   Component Value Date    BGM 87 2019     OTHER:   Lab Results   Component Value Date    LACT 0.4 (L) 2019    MICHAEL 8.3 (L) 2021    PHOS 4.2 2019    MAG 2.1 2019    ALBUMIN 3.7 2021    PROTTOTAL 6.0 (L) 2021    ALT 22 2021    AST 10 2021    ALKPHOS 62 2021    BILITOTAL 0.6 2021    LIPASE 21 (L) 2020    AMYLASE 24 (L) 2020    .0 (H) 2019        Preop Vitals    BP Readings from Last 3 Encounters:   21 113/68   08/10/21 111/68   21 110/57    Pulse Readings from Last 3 Encounters:   21 95   08/10/21 70   21 67      Resp Readings from Last 3 Encounters:   21 16   08/10/21 18   21 16    SpO2 Readings from Last 3 Encounters:   21 98%   08/10/21 99%   21 98%      Temp Readings from Last 1 Encounters:   21 36.7  C (98.1  F) (Oral)    Ht Readings from Last 1  "Encounters:   09/07/21 1.834 m (6' 0.21\")      Wt Readings from Last 1 Encounters:   09/07/21 80.7 kg (177 lb 14.6 oz)    Estimated body mass index is 23.99 kg/m  as calculated from the following:    Height as of an earlier encounter on 9/7/21: 1.834 m (6' 0.21\").    Weight as of this encounter: 80.7 kg (177 lb 14.6 oz).     LDA:  Port A Cath Single 10/24/19 Right Chest wall (Active)   Site Assessment WDL 09/07/21 0958   Dressing Status clean;dry;intact 09/07/21 0900   Dressing Intervention Transparent 09/07/21 0900   Dressing change due 04/02/20 03/26/20 0845   Line Status Blood return noted;Saline locked 09/07/21 0900   Access Date 09/07/21 09/07/21 0900   Access Attempts 1 09/07/21 0900   Gauge Power noncoring 90 degree bend;20 gauge;3/4 inch 09/07/21 0900   Needle Change Due 04/02/20 03/26/20 0845   Line Necessity Yes, meets criteria 09/07/21 0900   De-Access Date 08/10/21 08/10/21 1200   Date to be Reflushed 09/07/21 08/10/21 1200   Extravasation? 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      Past Surgical History:   Procedure Laterality Date     BONE MARROW BIOPSY, BONE SPECIMEN, NEEDLE/TROCAR Left 9/1/2019    Procedure: BIOPSY, BONE MARROW;  Surgeon: Heather Lopez MD;  [facility]     INSERT PICC LINE N/A 8/31/2019    Procedure: INSERTION, PICC;  Surgeon: Michell Keith MD;  [facility]     INSERT PORT VASCULAR ACCESS N/A 10/24/2019    Procedure: INSERTION, VASCULAR ACCESS PORT;  Surgeon: Silviano Martins MD;  [facility]      IR CHEST PORT PLACEMENT > 5 YRS OF AGE  " 10/24/2019     IR CHEST TUBE PLACEMENT NON-TUNNELLED LEFT  8/31/2019     IR PICC PLACEMENT > 5 YRS OF AGE  8/31/2019     IR PORT CHECK RIGHT  11/12/2019     SPINAL PUNCTURE,LUMBAR, INTRATHECAL CHEMO DELIVERY N/A 8/31/2019    Procedure: LUMBAR PUNCTURE, WITH INTRATHECAL CHEMOTHERAPY ADMINISTRATION;  Surgeon: Heather Lopez MD;  [facility]     SPINAL PUNCTURE,LUMBAR, INTRATHECAL CHEMO DELIVERY N/A 9/9/2019    Procedure: Lumbar Puncture With Intrathecal Chemo;  Surgeon: Alexi Hayes MD;  [facility]     SPINAL PUNCTURE,LUMBAR, INTRATHECAL CHEMO DELIVERY N/A 10/10/2019    Procedure: Lumbar puncture with IT Chemo (CD);  Surgeon: Reynaldo Campuzano MD;  [facility]      SPINAL PUNCTURE,LUMBAR, INTRATHECAL CHEMO DELIVERY N/A 10/17/2019    Procedure: Lumbar puncture with IT Chemo (not CD);  Surgeon: Reynaldo Campuzano MD;  [facility]      SPINAL PUNCTURE,LUMBAR, INTRATHECAL CHEMO DELIVERY N/A 10/24/2019    Procedure: LUMBAR PUNCTURE, WITH INTRATHECAL CHEMOTHERAPY ADMINISTRATION;  Surgeon: Félix Van APRN CNP;  [facility]      SPINAL PUNCTURE,LUMBAR, INTRATHECAL CHEMO DELIVERY N/A 10/31/2019    Procedure: Lumbar puncture with IT Chemo (not CD);  Surgeon: Reynaldo Campuzano MD;  [facility]      SPINAL PUNCTURE,LUMBAR, INTRATHECAL CHEMO DELIVERY N/A 12/19/2019    Procedure: Lumbar puncture with IT Chem (CD);  Surgeon: Félix Van APRN CNP;  [facility]      SPINAL PUNCTURE,LUMBAR, INTRATHECAL CHEMO DELIVERY N/A 12/23/2019    Procedure: Lumbar puncture with IT Chem (CD);  Surgeon: Heather Lopez MD;  [facility]      SPINAL PUNCTURE,LUMBAR, INTRATHECAL CHEMO DELIVERY N/A 1/23/2020    Procedure: Lumbar puncture with IT Chem (CD);  Surgeon: Reynaldo Campuzano MD;  [facility]      SPINAL PUNCTURE,LUMBAR, INTRATHECAL CHEMO DELIVERY N/A 2/20/2020     Procedure: Lumbar puncture with intrathecal Chemotherapy (CD);  Surgeon: Reynaldo Campuzano MD;  [facility]      SPINAL PUNCTURE,LUMBAR, INTRATHECAL CHEMO DELIVERY N/A 3/19/2020    Procedure: Lumbar puncture with intrathecal Chemotherapy (CD);  Surgeon: Reynaldo Campuzano MD;  [facility]      SPINAL PUNCTURE,LUMBAR, INTRATHECAL CHEMO DELIVERY N/A 3/26/2020    Procedure: Lumbar puncture with intrathecal Chemotherapy (not CD);  Surgeon: Todd Mercado MD;  [facility]      SPINAL PUNCTURE,LUMBAR, INTRATHECAL CHEMO DELIVERY N/A 4/23/2020    Procedure: Lumbar puncture with intrathecal Chemotherapy (CD);  Surgeon: Marleny Brewer MD;  [facility]      SPINAL PUNCTURE,LUMBAR, INTRATHECAL CHEMO DELIVERY N/A 5/14/2020    Procedure: Lumbar puncture with intrathecal Chemotherapy (not CD);  Surgeon: Todd Mercado MD;  [facility]      SPINAL PUNCTURE,LUMBAR, INTRATHECAL CHEMO DELIVERY N/A 7/9/2020    Procedure: Lumbar puncture with intrathecal Chemotherapy (CD);  Surgeon: Todd Mercado MD;  [facility]      SPINAL PUNCTURE,LUMBAR, INTRATHECAL CHEMO DELIVERY N/A 8/6/2020    Procedure: Lumbar puncture with intrathecal Chemotherapy (not CD);  Surgeon: Todd Mercado MD;  [facility]      SPINAL PUNCTURE,LUMBAR, INTRATHECAL CHEMO DELIVERY N/A 10/6/2020    Procedure: Lumbar puncture with intrathecal Chemotherapy (not CD);  Surgeon: Félix Van, APRN CNP;  [facility]      SPINAL PUNCTURE,LUMBAR, INTRATHECAL CHEMO DELIVERY N/A 11/3/2020    Procedure: Lumbar puncture with intrathecal Chemotherapy (not CD);  Surgeon: Reynaldo Campuzano MD;  [facility]      SPINAL PUNCTURE,LUMBAR, INTRATHECAL CHEMO DELIVERY N/A 12/29/2020    Procedure: Lumbar puncture with intrathecal Chemotherapy (CD);  Surgeon: Reynaldo Campuzano MD;  [facility]      SPINAL PUNCTURE,LUMBAR, INTRATHECAL CHEMO DELIVERY N/A 2021    Procedure: Lumbar puncture with intrathecal Chemotherapy (not CD);  Surgeon: Félix Van APRN CNP;  [facility]      SPINAL PUNCTURE,LUMBAR, INTRATHECAL CHEMO DELIVERY N/A 3/23/2021    Procedure: Lumbar puncture with intrathecal Chemotherapy (not CD);  Surgeon: Marie Damon NP;  [facility]      SPINAL PUNCTURE,LUMBAR, INTRATHECAL CHEMO DELIVERY N/A 2021    Procedure: Lumbar puncture with intrathecal Chemotherapy (not CD);  Surgeon: Marie Damon NP;  [facility]      SPINAL PUNCTURE,LUMBAR, INTRATHECAL CHEMO DELIVERY N/A 6/15/2021    Procedure: Lumbar puncture with intrathecal Chemotherapy (not CD);  Surgeon: Félix Van APRN CNP;  [facility]      THORACENTESIS N/A 2019    Procedure: Thoracentesis;  Surgeon: Michell Keith MD;  [facility]      Allergies   Allergen Reactions     Asparaginase Derivatives Other (See Comments)     Severe pancreatitis     No Known Drug Allergies         Anesthesia Evaluation    ROS/Med Hx    No history of anesthetic complications  Comments: Has tolerated MAC for his LP w/IT recently.  Felt pressure with last MAC on 6/15/21.    Cardiovascular Findings - negative ROS    Neuro Findings   Comments: Hx of migraines    Pulmonary Findings   Comments: HX of mediastinal mass at dx    HENT Findings - negative HENT ROS    Skin Findings - negative skin ROS     Findings   (-) prematurity      GI/Hepatic/Renal Findings   (-) liver disease and renal disease  Comments:   - H/o necrotizing pancreatitis d/t asparaginase; resolved    Endocrine/Metabolic Findings - negative ROS      Genetic/Syndrome Findings - negative genetics/syndromes ROS    Hematology/Oncology Findings   (+) cancer (T-cell Lymphoblastic Lymphoma)  Comments: Receiving chemo    Additional Notes  - H/o DVT in UE  - Marijuana use  - Vitamin D  deficiency          PHYSICAL EXAM:   Mental Status/Neuro: A/A/O   Airway: Facies: Feasible  Mallampati: I  Mouth/Opening: Full  TM distance: > 6 cm  Neck ROM: Full   Respiratory: Auscultation: CTAB     Resp. Rate: Normal     Resp. Effort: Normal      CV: Rhythm: Regular  Rate: Age appropriate  Heart: Normal Sounds  Edema: None   Comments:      Dental: Normal Dentition                Anesthesia Plan    ASA Status:  3   NPO Status:  NPO Appropriate    Anesthesia Type: MAC.     - Reason for MAC: straight local not clinically adequate   Induction: Intravenous.           Consents    Anesthesia Plan(s) and associated risks, benefits, and realistic alternatives discussed. Questions answered and patient/representative(s) expressed understanding.     - Discussed with:  Patient      - Extended Intubation/Ventilatory Support Discussed: No.      - Patient is DNR/DNI Status: No    Use of blood products discussed: No .     Postoperative Care    Pain management: Oral pain medications.   PONV prophylaxis: Ondansetron (or other 5HT-3)     Comments:    The patient endorsed understanding that it is normal to feel pressure under moderate sedation.    Discussed common and potentially harmful risks for MAC (GA as backup).   These risks include, but were not limited to: Conversion to secured airway, Sore throat, Airway injury, Dental injury, Aspiration, Respiratory issues (Bronchospasm, Laryngospasm, Desaturation), Hemodynamic issues (Arrhythmia, Hypotension, Ischemia), Potential long term consequences of respiratory and hemodynamic issues, PONV, Emergence delirium/agitation  Risks of invasive procedures were not discussed: N/A    All questions were answered.         Jia Angeles MD

## 2021-09-08 LAB
LYMPH ABN NFR CSF MANUAL: NORMAL %
MONOS+MACROS NFR CSF MANUAL: NORMAL %
NEUTROPHILS NFR CSF MANUAL: NORMAL %

## 2021-09-09 LAB
6-TGN ENTSUB RBC: 763 PMOL/8X10(8)RBC (ref 235–450)
6MMP ENTSUB RBC: 903 PMOL/8X10(8)RBC

## 2021-10-05 ENCOUNTER — OFFICE VISIT (OUTPATIENT)
Dept: PEDIATRIC HEMATOLOGY/ONCOLOGY | Facility: CLINIC | Age: 21
End: 2021-10-05
Attending: NURSE PRACTITIONER
Payer: COMMERCIAL

## 2021-10-05 ENCOUNTER — INFUSION THERAPY VISIT (OUTPATIENT)
Dept: INFUSION THERAPY | Facility: CLINIC | Age: 21
End: 2021-10-05
Attending: NURSE PRACTITIONER
Payer: COMMERCIAL

## 2021-10-05 VITALS
RESPIRATION RATE: 16 BRPM | HEART RATE: 71 BPM | OXYGEN SATURATION: 99 % | WEIGHT: 175.04 LBS | BODY MASS INDEX: 23.2 KG/M2 | SYSTOLIC BLOOD PRESSURE: 117 MMHG | HEIGHT: 73 IN | TEMPERATURE: 98.3 F | DIASTOLIC BLOOD PRESSURE: 65 MMHG

## 2021-10-05 DIAGNOSIS — C83.50 T LYMPHOBLASTIC LYMPHOMA (H): Primary | ICD-10-CM

## 2021-10-05 LAB
BASOPHILS # BLD AUTO: 0 10E3/UL (ref 0–0.2)
BASOPHILS NFR BLD AUTO: 0 %
EOSINOPHIL # BLD AUTO: 0.1 10E3/UL (ref 0–0.7)
EOSINOPHIL NFR BLD AUTO: 3 %
ERYTHROCYTE [DISTWIDTH] IN BLOOD BY AUTOMATED COUNT: 14.2 % (ref 10–15)
HCT VFR BLD AUTO: 30.9 % (ref 40–53)
HGB BLD-MCNC: 11 G/DL (ref 13.3–17.7)
IMM GRANULOCYTES # BLD: 0 10E3/UL
IMM GRANULOCYTES NFR BLD: 0 %
LYMPHOCYTES # BLD AUTO: 0.4 10E3/UL (ref 0.8–5.3)
LYMPHOCYTES NFR BLD AUTO: 13 %
MCH RBC QN AUTO: 36.4 PG (ref 26.5–33)
MCHC RBC AUTO-ENTMCNC: 35.6 G/DL (ref 31.5–36.5)
MCV RBC AUTO: 102 FL (ref 78–100)
MONOCYTES # BLD AUTO: 0.2 10E3/UL (ref 0–1.3)
MONOCYTES NFR BLD AUTO: 6 %
NEUTROPHILS # BLD AUTO: 2.2 10E3/UL (ref 1.6–8.3)
NEUTROPHILS NFR BLD AUTO: 78 %
NRBC # BLD AUTO: 0 10E3/UL
NRBC BLD AUTO-RTO: 0 /100
PLATELET # BLD AUTO: 163 10E3/UL (ref 150–450)
RBC # BLD AUTO: 3.02 10E6/UL (ref 4.4–5.9)
WBC # BLD AUTO: 2.8 10E3/UL (ref 4–11)

## 2021-10-05 PROCEDURE — 36591 DRAW BLOOD OFF VENOUS DEVICE: CPT | Performed by: NURSE PRACTITIONER

## 2021-10-05 PROCEDURE — 85025 COMPLETE CBC W/AUTO DIFF WBC: CPT | Performed by: NURSE PRACTITIONER

## 2021-10-05 PROCEDURE — 99215 OFFICE O/P EST HI 40 MIN: CPT | Performed by: NURSE PRACTITIONER

## 2021-10-05 PROCEDURE — 999N000040 HC STATISTIC CONSULT NO CHARGE VASC ACCESS

## 2021-10-05 PROCEDURE — 250N000011 HC RX IP 250 OP 636

## 2021-10-05 PROCEDURE — 999N000130 HC STATISTIC PORT-A-CATH ACCESS/FLUSHING

## 2021-10-05 PROCEDURE — 96409 CHEMO IV PUSH SNGL DRUG: CPT

## 2021-10-05 PROCEDURE — 258N000003 HC RX IP 258 OP 636: Performed by: NURSE PRACTITIONER

## 2021-10-05 PROCEDURE — 80299 QUANTITATIVE ASSAY DRUG: CPT | Performed by: NURSE PRACTITIONER

## 2021-10-05 PROCEDURE — 250N000011 HC RX IP 250 OP 636: Performed by: NURSE PRACTITIONER

## 2021-10-05 RX ORDER — HEPARIN SODIUM (PORCINE) LOCK FLUSH IV SOLN 100 UNIT/ML 100 UNIT/ML
3-5 SOLUTION INTRAVENOUS
Status: DISCONTINUED | OUTPATIENT
Start: 2021-10-05 | End: 2021-10-05 | Stop reason: HOSPADM

## 2021-10-05 RX ORDER — HEPARIN SODIUM (PORCINE) LOCK FLUSH IV SOLN 100 UNIT/ML 100 UNIT/ML
SOLUTION INTRAVENOUS
Status: COMPLETED
Start: 2021-10-05 | End: 2021-10-05

## 2021-10-05 RX ORDER — HEPARIN SODIUM (PORCINE) LOCK FLUSH IV SOLN 100 UNIT/ML 100 UNIT/ML
3-5 SOLUTION INTRAVENOUS
Status: CANCELLED | OUTPATIENT
Start: 2021-10-05

## 2021-10-05 RX ORDER — LIDOCAINE 40 MG/G
CREAM TOPICAL
Status: CANCELLED | OUTPATIENT
Start: 2021-10-05

## 2021-10-05 RX ADMIN — VINCRISTINE SULFATE 2 MG: 1 INJECTION, SOLUTION INTRAVENOUS at 14:51

## 2021-10-05 RX ADMIN — HEPARIN SODIUM (PORCINE) LOCK FLUSH IV SOLN 100 UNIT/ML 5 ML: 100 SOLUTION at 14:55

## 2021-10-05 RX ADMIN — HEPARIN 5 ML: 100 SYRINGE at 14:55

## 2021-10-05 ASSESSMENT — MIFFLIN-ST. JEOR: SCORE: 1835.26

## 2021-10-05 ASSESSMENT — PAIN SCALES - GENERAL: PAINLEVEL: NO PAIN (0)

## 2021-10-05 NOTE — PROGRESS NOTES
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 Lazaro presented with acute onset cough and was found to have an anterior mediastinal mass and malignant left-sided pleural effusion.  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  He was admitted to [facility] oncology service 8/30 and started on treatment per [id number].  He had a pleural effusion that required a chest tube and a pericardial effusion that was not drained. His Induction was complicated by cardiac compression secondary to his mass leading to tamponade, this improved through out his hospital stay.  He also had dysphagia that improved with treatment of his mass, swallow study was normal. His course was complicated by extensive bilateral UE DVTs, and he was successfully treated with anticoagulation. His consolidation course was complicated by the development of severe asparaginase induced necrotizing pancreatitis. He became quite ill with SIRS and associated hypotension, he required pressor support in the ICU. As a result, asparaginase was eliminated from his treatment plan. He was in CR status at end of consolidation. During maintenance, he developed hepatotoxicity so allopurinol and dose reduced 6MP were started for correction of skewed 6-MP metabolism.  Lazaro is being treated per [facility] protocol [id number] and comes to [facility] today for Day 29 of Cycle 7.     HPI:   Since his last visit Lazaro has done very well.  He continues to work full time and has good energy.  Eating well, does not have any limitations in regards to what he can eat, nothing seems to irritate his pancreas.  He denies constipation.  No pain or skin concerns.  He has no fabi fever or respiratory symptoms.  He has some good questions about the end of therapy.    Lazaro has some questions about the flu and covid vaccines.  He feels the biggest barrier has  been scheduling appts.    Review of systems:  The 10 point Review of Systems is negative other than noted in the HPI or here.      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 Former daily marajuana smoker, now only uses occasionally    PFMH:   Family History   Problem Relation Age of Onset     No Known Problems Mother      No Known Problems Father      Asthma Brother      Thyroid Cancer Paternal Grandmother      Melanoma Paternal Aunt        Social History:   Social History     Social History Narrative    Lazaro previously lived at home with his dad and step mom in [place] but is now staying with his grandma in [place]. Biological mother is involved in his life. Has 4 step-siblings and 1 biological sibling. 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 Has completed high school. 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  He has been enjoying fishing and video games.        Current Medications:  Current Outpatient Medications   Medication Sig Dispense Refill     allopurinol (ZYLOPRIM) 100 MG tablet Take 1 tablet (100 mg) by mouth daily 30 tablet 11     diphenhydrAMINE (BENADRYL) 25 MG capsule Take 1-2 capsules (25-50 mg) by mouth every 6 hours as  needed (Breakthrough Nausea and Vomiting ) (Patient not taking: Reported on 10/5/2021) 30 capsule 1     lidocaine-prilocaine (EMLA) 2.5-2.5 % external cream Apply topically as needed for other (prior to port access) (Patient not taking: Reported on 10/5/2021) 30 g 6     magic mouthwash suspension (diphenhydrAMINE, lidocaine, aluminum-magnesium & simethicone) Swish and spit 10 mLs in mouth 4 times daily (with meals and nightly) (Patient not taking: Reported on 10/5/2021) 120 mL 2     mercaptopurine (PURINETHOL) 50 MG tablet Take 75mg (1.5 tablets) daily 45 tablet 2     methotrexate 2.5 MG tablet Take 20 tablets (50 mg) by mouth once a week Do not take on Days of LP. 80 tablet 2     ondansetron (ZOFRAN-ODT) 8 MG ODT tab Take 1 tablet (8 mg) by mouth every 8 hours as needed for nausea (Patient not taking: Reported on 10/5/2021) 20 tablet 6     pantoprazole (PROTONIX) 40 MG EC tablet Take 1 tablet (40 mg) by mouth every morning (before breakfast) During weeks of taking prednisone. 30 tablet 2     polyethylene glycol (MIRALAX/GLYCOLAX) packet Take 17 g by mouth daily (Patient not taking: Reported on 10/5/2021) 30 packet 0     predniSONE (DELTASONE) 10 MG tablet Take 40mg (4 tabs) twice dialy for 5 days every 4 weeks. 40 tablet 2     sennosides (SENOKOT) 8.6 MG tablet Take 2 tablets by mouth 2 times daily as needed for constipation 60 each 1     sulfamethoxazole-trimethoprim (BACTRIM DS) 800-160 MG tablet Take 1 tablet by mouth Every Mon, Tues two times daily 16 tablet 11     Vitamin D, Cholecalciferol, 25 MCG (1000 UT) TABS Take 1 tablet (1,000 Units) by mouth daily 30 tablet 3       Reviewed medications with Lazaro. Confirmed oral chemo doses, and notes no missed doses. He is not taking Vitamin D currently.  Received [id number] influenza vaccine on 12/22/20     Physical Exam:   Temp:  [98.3  F (36.8  C)] 98.3  F (36.8  C)  Pulse:  [71] 71  Resp:  [16] 16  BP: (117)/(65) 117/65  SpO2:  [99 %] 99 %  Wt Readings from Last 4  "Encounters:   10/05/21 79.4 kg (175 lb 0.7 oz)   09/07/21 80.7 kg (177 lb 14.6 oz)   09/07/21 80.7 kg (177 lb 14.6 oz)   08/10/21 78.3 kg (172 lb 9.9 oz)     Ht Readings from Last 2 Encounters:   10/05/21 1.842 m (6' 0.52\")   09/07/21 1.834 m (6' 0.21\")     General: Lazaro is alert, interactive and appropriate, well-appearing, in no distress  HEENT: Skull is atrauamatic and normocephalic.  Full head of hair. PERRLA, sclera are non icteric and not injected, EOM are intact. Nares are patent without drainage. Oropharynx clear, no plaques noted. No mucositis. MMM.  Neck:  Supple without lymphadenopathy.   Lymph: There is no cervical, supraclavicular, axillary, lymphadenopathy palpated.  Cardiovascular:  HR is regular, S1, S2 no murmur.  Capillary refill is < 2 seconds.   Respiratory: No cough noted. Respirations are easy. Lungs are clear to auscultation throughout.  No crackles or wheezes.  Gastrointestinal: BS present in all quadrants.  Abdomen is soft and flat.  No pain with palpation. No hepatosplenomegaly or masses are palpated.  Skin: No concerning lesions. No rashes, bruises.  Neurological:  CN 2-12 grossly intact. Gait is normal.  No issues with balance. Sensation intact in hands and feet.     Musculoskeletal: Good strength and ROM in all extremities.  Strong dorsiflexion at ankles and great toes (5/5) bilaterally without any pain at the Achilles.     Labs:   Results for orders placed or performed in visit on 10/05/21   CBC with platelets and differential     Status: Abnormal   Result Value Ref Range    WBC Count 2.8 (L) 4.0 - 11.0 10e3/uL    RBC Count 3.02 (L) 4.40 - 5.90 10e6/uL    Hemoglobin 11.0 (L) 13.3 - 17.7 g/dL    Hematocrit 30.9 (L) 40.0 - 53.0 %     (H) 78 - 100 fL    MCH 36.4 (H) 26.5 - 33.0 pg    MCHC 35.6 31.5 - 36.5 g/dL    RDW 14.2 10.0 - 15.0 %    Platelet Count 163 150 - 450 10e3/uL    % Neutrophils 78 %    % Lymphocytes 13 %    % Monocytes 6 %    % Eosinophils 3 %    % Basophils 0 %    % " Immature Granulocytes 0 %    NRBCs per 100 WBC 0 <1 /100    Absolute Neutrophils 2.2 1.6 - 8.3 10e3/uL    Absolute Lymphocytes 0.4 (L) 0.8 - 5.3 10e3/uL    Absolute Monocytes 0.2 0.0 - 1.3 10e3/uL    Absolute Eosinophils 0.1 0.0 - 0.7 10e3/uL    Absolute Basophils 0.0 0.0 - 0.2 10e3/uL    Absolute Immature Granulocytes 0.0 <=0.0 10e3/uL    Absolute NRBCs 0.0 10e3/uL   CBC with platelets differential     Status: Abnormal    Narrative    The following orders were created for panel order CBC with platelets differential.  Procedure                               Abnormality         Status                     ---------                               -----------         ------                     CBC with platelets and d...[[id number]]  Abnormal            Final result                 Please view results for these tests on the individual orders.       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  He is being treated per [facility] protocol [id number]. He's had a CRu following Induction with a CR at the end of Consolidation. His course was complicated by extensive bilateral DVT and severe necrotizing pancreatitis requiring cessation of PEG-asparaginase from his treatment.  He comes to clinic today for Day 29 of Cycle 7 of Maintenance therapy. He is currently on 50% full dose of 6MP with allopurinol for correction of skewed 6-MP metabolism and 125% methotrexate. ANC remains above target range.    Plan:   1) Labs reviewed with Lazaro. Continue 6MP at 50%= 75mg daily and methotrexate at 125% full dose= 20 tabs. Continue allopurinol.  If ANC remains elevated next month consider a small escalation in 6MP.  2) Start prednisone burst today, continue protonix during bursts.  3) IV vincristine in clinic today.  4) Continue on recommend that he take Vit D 1000 IU daily.  5) Bactrim for PCP prophylaxis.   6) Continue to check TGNs monthly moving forward.  7) Lazaro will need  COVID testing prior to LPs moving forward.  He prefers to come the day of, have it done in sedation and then come to clinic for chemo while he is waiting for results.  His last LP on 11/30 will be coordinated with port removal.  8) Reviewed recommendation for covid vaccine, discussed pharmacies that could see him on a walk in basis.  Recommend he go in about 2.5 weeks from now and get his flu and covid vaccines.   9) Plan for end of therapy CXR and echo.  10) RTC in 4 weeks for Day 57 of cycle 7.     Félix Van CNP    Total time spent on the following services on the date of the encounter: 50 minutes  Preparing to see patient with chart review    Ordering medications, labs tests, chemotherapy   Communicating with other healthcare professionals and care coordination  Interpretation of labs  Performing a medically appropriate examination   Counseling and educating the patient/family/caregiver   Communicating results to the patient/family/caregiver   Documenting clinical information in the electronic health record

## 2021-10-05 NOTE — PROGRESS NOTES
Infusion Nursing Note    Lazaro PLUNKETT Kalerobbin presents to [facility] today for: Vincristine    Due to : T lymphoblastic lymphoma (H)    Intravenous Access/Labs: Port accessed using sterile technique. Labs drawn per orders.     Coping:   Child Family Life declined    Infusion Note: Vincristine given to gravity. Blood return noted pre/mid/post infusion. Port heparin locked and de-accessed at the end of appointment. Seen by Luana PENALOZA NP during visit.    Discharge Plan:   Patient verbalized understanding of discharge instructions. Pt left [facility] in stable condition.

## 2021-10-08 LAB
6-TGN ENTSUB RBC: 803 PMOL/8X10(8)RBC (ref 235–450)
6MMP ENTSUB RBC: <446 PMOL/8X10(8)RBC

## 2021-10-23 ENCOUNTER — HEALTH MAINTENANCE LETTER (OUTPATIENT)
Age: 21
End: 2021-10-23

## 2021-10-26 DIAGNOSIS — Z11.59 ENCOUNTER FOR SCREENING FOR OTHER VIRAL DISEASES: ICD-10-CM

## 2021-11-02 ENCOUNTER — INFUSION THERAPY VISIT (OUTPATIENT)
Dept: INFUSION THERAPY | Facility: CLINIC | Age: 21
End: 2021-11-02
Attending: NURSE PRACTITIONER
Payer: COMMERCIAL

## 2021-11-02 ENCOUNTER — OFFICE VISIT (OUTPATIENT)
Dept: PEDIATRIC HEMATOLOGY/ONCOLOGY | Facility: CLINIC | Age: 21
End: 2021-11-02
Attending: NURSE PRACTITIONER
Payer: COMMERCIAL

## 2021-11-02 VITALS
BODY MASS INDEX: 23.8 KG/M2 | SYSTOLIC BLOOD PRESSURE: 109 MMHG | HEART RATE: 72 BPM | OXYGEN SATURATION: 98 % | TEMPERATURE: 98.1 F | RESPIRATION RATE: 18 BRPM | DIASTOLIC BLOOD PRESSURE: 63 MMHG | WEIGHT: 175.71 LBS | HEIGHT: 72 IN

## 2021-11-02 DIAGNOSIS — C83.50 T LYMPHOBLASTIC LYMPHOMA (H): ICD-10-CM

## 2021-11-02 DIAGNOSIS — E55.9 VITAMIN D DEFICIENCY: ICD-10-CM

## 2021-11-02 DIAGNOSIS — C83.50 T LYMPHOBLASTIC LYMPHOMA (H): Primary | ICD-10-CM

## 2021-11-02 LAB
BASOPHILS # BLD AUTO: 0 10E3/UL (ref 0–0.2)
BASOPHILS NFR BLD AUTO: 0 %
EOSINOPHIL # BLD AUTO: 0.1 10E3/UL (ref 0–0.7)
EOSINOPHIL NFR BLD AUTO: 4 %
ERYTHROCYTE [DISTWIDTH] IN BLOOD BY AUTOMATED COUNT: 14.8 % (ref 10–15)
HCT VFR BLD AUTO: 29.6 % (ref 40–53)
HGB BLD-MCNC: 10.7 G/DL (ref 13.3–17.7)
IMM GRANULOCYTES # BLD: 0 10E3/UL
IMM GRANULOCYTES NFR BLD: 0 %
LYMPHOCYTES # BLD AUTO: 0.3 10E3/UL (ref 0.8–5.3)
LYMPHOCYTES NFR BLD AUTO: 11 %
MCH RBC QN AUTO: 37.4 PG (ref 26.5–33)
MCHC RBC AUTO-ENTMCNC: 36.1 G/DL (ref 31.5–36.5)
MCV RBC AUTO: 104 FL (ref 78–100)
MONOCYTES # BLD AUTO: 0.1 10E3/UL (ref 0–1.3)
MONOCYTES NFR BLD AUTO: 4 %
NEUTROPHILS # BLD AUTO: 2.2 10E3/UL (ref 1.6–8.3)
NEUTROPHILS NFR BLD AUTO: 81 %
NRBC # BLD AUTO: 0 10E3/UL
NRBC BLD AUTO-RTO: 0 /100
PLATELET # BLD AUTO: 195 10E3/UL (ref 150–450)
RBC # BLD AUTO: 2.86 10E6/UL (ref 4.4–5.9)
WBC # BLD AUTO: 2.7 10E3/UL (ref 4–11)

## 2021-11-02 PROCEDURE — 99215 OFFICE O/P EST HI 40 MIN: CPT | Performed by: NURSE PRACTITIONER

## 2021-11-02 PROCEDURE — 258N000003 HC RX IP 258 OP 636: Performed by: NURSE PRACTITIONER

## 2021-11-02 PROCEDURE — 36591 DRAW BLOOD OFF VENOUS DEVICE: CPT | Performed by: NURSE PRACTITIONER

## 2021-11-02 PROCEDURE — 85025 COMPLETE CBC W/AUTO DIFF WBC: CPT | Performed by: NURSE PRACTITIONER

## 2021-11-02 PROCEDURE — 250N000011 HC RX IP 250 OP 636: Performed by: NURSE PRACTITIONER

## 2021-11-02 PROCEDURE — 250N000011 HC RX IP 250 OP 636

## 2021-11-02 PROCEDURE — 96409 CHEMO IV PUSH SNGL DRUG: CPT

## 2021-11-02 PROCEDURE — 80299 QUANTITATIVE ASSAY DRUG: CPT | Performed by: NURSE PRACTITIONER

## 2021-11-02 RX ORDER — HEPARIN SODIUM (PORCINE) LOCK FLUSH IV SOLN 100 UNIT/ML 100 UNIT/ML
3-5 SOLUTION INTRAVENOUS
Status: DISCONTINUED | OUTPATIENT
Start: 2021-11-02 | End: 2021-11-02 | Stop reason: HOSPADM

## 2021-11-02 RX ORDER — HEPARIN SODIUM (PORCINE) LOCK FLUSH IV SOLN 100 UNIT/ML 100 UNIT/ML
3-5 SOLUTION INTRAVENOUS
Status: CANCELLED | OUTPATIENT
Start: 2021-11-02

## 2021-11-02 RX ORDER — MERCAPTOPURINE 50 MG/1
TABLET ORAL
Qty: 54 TABLET | Refills: 0 | Status: SHIPPED | OUTPATIENT
Start: 2021-11-02 | End: 2022-01-07

## 2021-11-02 RX ORDER — MULTIVIT-MIN/IRON/FOLIC ACID/K 18-600-40
1000 CAPSULE ORAL DAILY
Qty: 30 TABLET | Refills: 3 | Status: SHIPPED | OUTPATIENT
Start: 2021-11-02 | End: 2022-06-28

## 2021-11-02 RX ORDER — LIDOCAINE 40 MG/G
CREAM TOPICAL
Status: CANCELLED | OUTPATIENT
Start: 2021-11-02

## 2021-11-02 RX ORDER — HEPARIN SODIUM (PORCINE) LOCK FLUSH IV SOLN 100 UNIT/ML 100 UNIT/ML
SOLUTION INTRAVENOUS
Status: COMPLETED
Start: 2021-11-02 | End: 2021-11-02

## 2021-11-02 RX ADMIN — HEPARIN SODIUM (PORCINE) LOCK FLUSH IV SOLN 100 UNIT/ML 5 ML: 100 SOLUTION at 15:20

## 2021-11-02 RX ADMIN — HEPARIN 5 ML: 100 SYRINGE at 15:20

## 2021-11-02 RX ADMIN — VINCRISTINE SULFATE 2 MG: 1 INJECTION, SOLUTION INTRAVENOUS at 15:07

## 2021-11-02 ASSESSMENT — MIFFLIN-ST. JEOR: SCORE: 1837.62

## 2021-11-02 NOTE — PROGRESS NOTES
Infusion Nursing Note    Lazaro PLUNKETT Kalerobbin Presents to [facility] today for: Vincristine.    Due to : T lymphoblastic lymphoma (H)    Intravenous Access/Labs: Port accessed using sterile technique by SUJATHA Rivera. Labs drawn per orders.     Coping:   Child Family Life declined    Infusion Note: Vincristine given to gravity. Blood return noted pre/mid/post infusion. Port heparin locked and deaccessed. Pt seen by ANGELY Salazar. Take home meds provided by Zohra.

## 2021-11-02 NOTE — LETTER
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 Lazaro presented with acute onset cough and was found to have an anterior mediastinal mass and malignant left-sided pleural effusion.  A CT guided biopsy was obtained at [facility] and pathology was consistent with T-cell leukemia vs lymphoma.  He was admitted to [facility] oncology service 8/30 and started on treatment per [id number].  He had a pleural effusion that required a chest tube and a pericardial effusion that was not drained. His Induction was complicated by cardiac compression secondary to his mass leading to tamponade, this improved through out his hospital stay.  He also had dysphagia that improved with treatment of his mass, swallow study was normal. His course was complicated by extensive bilateral UE DVTs, and he was successfully treated with anticoagulation. His consolidation course was complicated by the development of severe asparaginase induced necrotizing pancreatitis. He became quite ill with SIRS and associated hypotension, he required pressor support in the ICU. As a result, asparaginase was eliminated from his treatment plan. He was in CR status at end of consolidation. During maintenance, he developed hepatotoxicity so allopurinol and dose reduced 6MP were started for correction of skewed 6-MP metabolism.  Lazaro is being treated per [facility] protocol [id number] and comes to [facility] today for Day 57 of Cycle 7.     HPI:   Lazaro continues to do very well.  He continues to work full time and has good energy.  He did develop some nasal congestion a couple of weeks ago, but this has since resolved. No fever, cough, or shortness of breath. No other acute ill symptoms. Eating well, does not have any limitations in regards to what he can eat. He denies constipation or diarrhea. No nausea or emesis  concerns. No pain or skin concerns. No other questions or concerns today.    Review of systems:  The 10 point Review of Systems is negative other than noted in the HPI or here.      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 Former daily marajuana smoker, now only uses occasionally    PFMH:   Family History   Problem Relation Age of Onset     No Known Problems Mother      No Known Problems Father      Asthma Brother      Thyroid Cancer Paternal Grandmother      Melanoma Paternal Aunt        Social History:   Social History     Social History Narrative    Lazaro previously lived at home with his dad and step mom in [place] but is now staying with his grandma in [place]. Biological mother is involved in his life. Has 4 step-siblings and 1 biological sibling. 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 Has completed high school. 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  He has been enjoying fishing and video games.        Current Medications:  Current Outpatient Medications   Medication Sig Dispense Refill     mercaptopurine (PURINETHOL) 50 MG tablet Take 75mg (1.5 tablets) x 2 days per week & 100mg (2 tablets) x 5 days per week. 54 tablet 0      Vitamin D, Cholecalciferol, 25 MCG (1000 UT) TABS Take 1 tablet (1,000 Units) by mouth daily 30 tablet 3     allopurinol (ZYLOPRIM) 100 MG tablet Take 1 tablet (100 mg) by mouth daily 30 tablet 11     diphenhydrAMINE (BENADRYL) 25 MG capsule Take 1-2 capsules (25-50 mg) by mouth every 6 hours as needed (Breakthrough Nausea and Vomiting ) (Patient not taking: Reported on 11/2/2021) 30 capsule 1     lidocaine-prilocaine (EMLA) 2.5-2.5 % external cream Apply topically as needed for other (prior to port access) 30 g 6     magic mouthwash suspension (diphenhydrAMINE, lidocaine, aluminum-magnesium & simethicone) Swish and spit 10 mLs in mouth 4 times daily (with meals and nightly) (Patient not taking: Reported on 11/2/2021) 120 mL 2     methotrexate 2.5 MG tablet Take 20 tablets (50 mg) by mouth once a week Do not take on Days of LP. 80 tablet 2     ondansetron (ZOFRAN-ODT) 8 MG ODT tab Take 1 tablet (8 mg) by mouth every 8 hours as needed for nausea (Patient not taking: Reported on 11/2/2021) 20 tablet 6     pantoprazole (PROTONIX) 40 MG EC tablet Take 1 tablet (40 mg) by mouth every morning (before breakfast) During weeks of taking prednisone. 30 tablet 2     polyethylene glycol (MIRALAX/GLYCOLAX) packet Take 17 g by mouth daily (Patient not taking: Reported on 10/5/2021) 30 packet 0     predniSONE (DELTASONE) 10 MG tablet Take 40mg (4 tabs) twice dialy for 5 days every 4 weeks. 40 tablet 2     sennosides (SENOKOT) 8.6 MG tablet Take 2 tablets by mouth 2 times daily as needed for constipation (Patient not taking: Reported on 11/2/2021) 60 each 1     sulfamethoxazole-trimethoprim (BACTRIM DS) 800-160 MG tablet Take 1 tablet by mouth Every Mon, Tues two times daily 16 tablet 11   Reviewed medications with Lazaro. Confirmed oral chemo doses, and notes no missed doses.     Physical Exam:   Temp:  [98.1  F (36.7  C)] 98.1  F (36.7  C)  Pulse:  [72] 72  Resp:  [18] 18  BP: (109)/(63) 109/63  SpO2:  [98 %] 98 %  Wt Readings  "from Last 4 Encounters:   11/02/21 79.7 kg (175 lb 11.3 oz)   10/05/21 79.4 kg (175 lb 0.7 oz)   09/07/21 80.7 kg (177 lb 14.6 oz)   09/07/21 80.7 kg (177 lb 14.6 oz)     Ht Readings from Last 2 Encounters:   11/02/21 1.841 m (6' 0.48\")   10/05/21 1.842 m (6' 0.52\")     General: Lazaro is alert, interactive and appropriate, well-appearing, in no distress  HEENT: Skull is atrauamatic and normocephalic.  Full head of hair. PERRLA, sclera are non icteric and not injected, EOM are intact. Nares are patent without drainage. Oropharynx clear, no plaques noted. No mucositis. MMM.  Neck:  Supple without lymphadenopathy.   Lymph: There is no cervical, supraclavicular, axillary, lymphadenopathy palpated.  Cardiovascular:  HR is regular, S1, S2 no murmur.  Capillary refill is < 2 seconds.   Respiratory: No cough noted. Respirations are easy. Lungs are clear to auscultation throughout.  No crackles or wheezes.  Gastrointestinal: BS present in all quadrants.  Abdomen is soft and flat.  No pain with palpation. No hepatosplenomegaly or masses are palpated.  Skin: No concerning lesions. No rashes, bruises.  Neurological:  CN 2-12 grossly intact. Gait is normal.  No issues with balance. Sensation intact in hands and feet.     Musculoskeletal: Good strength and ROM in all extremities.  Strong dorsiflexion at ankles and great toes (5/5) bilaterally without any pain at the Achilles.     Labs:   Results for orders placed or performed in visit on 11/02/21   CBC with platelets and differential     Status: Abnormal   Result Value Ref Range    WBC Count 2.7 (L) 4.0 - 11.0 10e3/uL    RBC Count 2.86 (L) 4.40 - 5.90 10e6/uL    Hemoglobin 10.7 (L) 13.3 - 17.7 g/dL    Hematocrit 29.6 (L) 40.0 - 53.0 %     (H) 78 - 100 fL    MCH 37.4 (H) 26.5 - 33.0 pg    MCHC 36.1 31.5 - 36.5 g/dL    RDW 14.8 10.0 - 15.0 %    Platelet Count 195 150 - 450 10e3/uL    % Neutrophils 81 %    % Lymphocytes 11 %    % Monocytes 4 %    % Eosinophils 4 %    % " Basophils 0 %    % Immature Granulocytes 0 %    NRBCs per 100 WBC 0 <1 /100    Absolute Neutrophils 2.2 1.6 - 8.3 10e3/uL    Absolute Lymphocytes 0.3 (L) 0.8 - 5.3 10e3/uL    Absolute Monocytes 0.1 0.0 - 1.3 10e3/uL    Absolute Eosinophils 0.1 0.0 - 0.7 10e3/uL    Absolute Basophils 0.0 0.0 - 0.2 10e3/uL    Absolute Immature Granulocytes 0.0 <=0.0 10e3/uL    Absolute NRBCs 0.0 10e3/uL   CBC with platelets differential     Status: Abnormal    Narrative    The following orders were created for panel order CBC with platelets differential.  Procedure                               Abnormality         Status                     ---------                               -----------         ------                     CBC with platelets and d...[[id number]]  Abnormal            Final result                 Please view results for these tests on the individual orders.       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  He is being treated per [facility] protocol [id number]. He's had a CRu following Induction with a CR at the end of Consolidation. His course was complicated by extensive bilateral DVT and severe necrotizing pancreatitis requiring cessation of PEG-asparaginase from his treatment.  He comes to clinic today for Day 57 of Cycle 7 of Maintenance therapy. He is currently on 50% full dose of 6MP with allopurinol for correction of skewed 6-MP metabolism and 125% methotrexate. ANC remains above target range.    Plan:   1) Labs reviewed with Lazaro. Increase 6MP to 62.5%= 75mg x 2 d & 100mg x 5d; Continue methotrexate at 125% full dose= 20 tabs. Continue allopurinol.  2) Start prednisone burst today, continue protonix during bursts.  3) IV vincristine in clinic today.  4) Continue to recommend that he take Vit D 1000 IU daily. Refill provided today.  5) Bactrim for PCP prophylaxis.   6) Continue to check TGNs monthly moving forward. Pending from today.  7)  Lazaro will need COVID testing prior to LPs moving forward.  He prefers to come the day of, have it done in sedation and then come to clinic for chemo while he is waiting for results.  His last LP on 11/30 will be coordinated with port removal.  8) Reviewed recommendation for covid vaccine, discussed pharmacies that could see him on a walk in basis.  Recommend he go in about 2.5 weeks from now and get his flu and covid vaccines. Plans to make this appointment.  9) Plan for end of therapy CXR and echo.  10) RTC in 4 weeks for Day 1 of cycle 8 with lawson ringing!    Marie Damon CNP    Total time spent on the following services on the date of the encounter: 50 minutes  Preparing to see patient with chart review    Ordering medications, labs tests, chemotherapy   Communicating with other healthcare professionals and care coordination  Interpretation of labs  Performing a medically appropriate examination   Counseling and educating the patient/family/caregiver   Communicating results to the patient/family/caregiver   Documenting clinical information in the electronic health record         Marie Damon NP

## 2021-11-02 NOTE — PROGRESS NOTES
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 Lazaro presented with acute onset cough and was found to have an anterior mediastinal mass and malignant left-sided pleural effusion.  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  He was admitted to [facility] oncology service 8/30 and started on treatment per [id number].  He had a pleural effusion that required a chest tube and a pericardial effusion that was not drained. His Induction was complicated by cardiac compression secondary to his mass leading to tamponade, this improved through out his hospital stay.  He also had dysphagia that improved with treatment of his mass, swallow study was normal. His course was complicated by extensive bilateral UE DVTs, and he was successfully treated with anticoagulation. His consolidation course was complicated by the development of severe asparaginase induced necrotizing pancreatitis. He became quite ill with SIRS and associated hypotension, he required pressor support in the ICU. As a result, asparaginase was eliminated from his treatment plan. He was in CR status at end of consolidation. During maintenance, he developed hepatotoxicity so allopurinol and dose reduced 6MP were started for correction of skewed 6-MP metabolism.  Lazaro is being treated per [facility] protocol [id number] and comes to [facility] today for Day 57 of Cycle 7.     HPI:   Lazaro continues to do very well.  He continues to work full time and has good energy.  He did develop some nasal congestion a couple of weeks ago, but this has since resolved. No fever, cough, or shortness of breath. No other acute ill symptoms. Eating well, does not have any limitations in regards to what he can eat. He denies constipation or diarrhea. No nausea or emesis concerns. No pain or skin concerns. No other questions or concerns today.    Review of  systems:  The 10 point Review of Systems is negative other than noted in the HPI or here.      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 Former daily marajuana smoker, now only uses occasionally    PFMH:   Family History   Problem Relation Age of Onset     No Known Problems Mother      No Known Problems Father      Asthma Brother      Thyroid Cancer Paternal Grandmother      Melanoma Paternal Aunt        Social History:   Social History     Social History Narrative    Lazaro previously lived at home with his dad and step mom in [place] but is now staying with his grandma in [place]. Biological mother is involved in his life. Has 4 step-siblings and 1 biological sibling. 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 Has completed high school. 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  He has been enjoying fishing and video games.        Current Medications:  Current Outpatient Medications   Medication Sig Dispense Refill     mercaptopurine (PURINETHOL) 50 MG tablet Take 75mg (1.5 tablets) x 2 days per week & 100mg (2 tablets) x 5 days per week. 54 tablet 0     Vitamin D, Cholecalciferol, 25 MCG (1000 UT) TABS Take 1 tablet (1,000 Units) by mouth  daily 30 tablet 3     allopurinol (ZYLOPRIM) 100 MG tablet Take 1 tablet (100 mg) by mouth daily 30 tablet 11     diphenhydrAMINE (BENADRYL) 25 MG capsule Take 1-2 capsules (25-50 mg) by mouth every 6 hours as needed (Breakthrough Nausea and Vomiting ) (Patient not taking: Reported on 11/2/2021) 30 capsule 1     lidocaine-prilocaine (EMLA) 2.5-2.5 % external cream Apply topically as needed for other (prior to port access) 30 g 6     magic mouthwash suspension (diphenhydrAMINE, lidocaine, aluminum-magnesium & simethicone) Swish and spit 10 mLs in mouth 4 times daily (with meals and nightly) (Patient not taking: Reported on 11/2/2021) 120 mL 2     methotrexate 2.5 MG tablet Take 20 tablets (50 mg) by mouth once a week Do not take on Days of LP. 80 tablet 2     ondansetron (ZOFRAN-ODT) 8 MG ODT tab Take 1 tablet (8 mg) by mouth every 8 hours as needed for nausea (Patient not taking: Reported on 11/2/2021) 20 tablet 6     pantoprazole (PROTONIX) 40 MG EC tablet Take 1 tablet (40 mg) by mouth every morning (before breakfast) During weeks of taking prednisone. 30 tablet 2     polyethylene glycol (MIRALAX/GLYCOLAX) packet Take 17 g by mouth daily (Patient not taking: Reported on 10/5/2021) 30 packet 0     predniSONE (DELTASONE) 10 MG tablet Take 40mg (4 tabs) twice dialy for 5 days every 4 weeks. 40 tablet 2     sennosides (SENOKOT) 8.6 MG tablet Take 2 tablets by mouth 2 times daily as needed for constipation (Patient not taking: Reported on 11/2/2021) 60 each 1     sulfamethoxazole-trimethoprim (BACTRIM DS) 800-160 MG tablet Take 1 tablet by mouth Every Mon, Tues two times daily 16 tablet 11   Reviewed medications with Lazaro. Confirmed oral chemo doses, and notes no missed doses.     Physical Exam:   Temp:  [98.1  F (36.7  C)] 98.1  F (36.7  C)  Pulse:  [72] 72  Resp:  [18] 18  BP: (109)/(63) 109/63  SpO2:  [98 %] 98 %  Wt Readings from Last 4 Encounters:   11/02/21 79.7 kg (175 lb 11.3 oz)   10/05/21 79.4 kg (175 lb  "0.7 oz)   09/07/21 80.7 kg (177 lb 14.6 oz)   09/07/21 80.7 kg (177 lb 14.6 oz)     Ht Readings from Last 2 Encounters:   11/02/21 1.841 m (6' 0.48\")   10/05/21 1.842 m (6' 0.52\")     General: Lazaro is alert, interactive and appropriate, well-appearing, in no distress  HEENT: Skull is atrauamatic and normocephalic.  Full head of hair. PERRLA, sclera are non icteric and not injected, EOM are intact. Nares are patent without drainage. Oropharynx clear, no plaques noted. No mucositis. MMM.  Neck:  Supple without lymphadenopathy.   Lymph: There is no cervical, supraclavicular, axillary, lymphadenopathy palpated.  Cardiovascular:  HR is regular, S1, S2 no murmur.  Capillary refill is < 2 seconds.   Respiratory: No cough noted. Respirations are easy. Lungs are clear to auscultation throughout.  No crackles or wheezes.  Gastrointestinal: BS present in all quadrants.  Abdomen is soft and flat.  No pain with palpation. No hepatosplenomegaly or masses are palpated.  Skin: No concerning lesions. No rashes, bruises.  Neurological:  CN 2-12 grossly intact. Gait is normal.  No issues with balance. Sensation intact in hands and feet.     Musculoskeletal: Good strength and ROM in all extremities.  Strong dorsiflexion at ankles and great toes (5/5) bilaterally without any pain at the Achilles.     Labs:   Results for orders placed or performed in visit on 11/02/21   CBC with platelets and differential     Status: Abnormal   Result Value Ref Range    WBC Count 2.7 (L) 4.0 - 11.0 10e3/uL    RBC Count 2.86 (L) 4.40 - 5.90 10e6/uL    Hemoglobin 10.7 (L) 13.3 - 17.7 g/dL    Hematocrit 29.6 (L) 40.0 - 53.0 %     (H) 78 - 100 fL    MCH 37.4 (H) 26.5 - 33.0 pg    MCHC 36.1 31.5 - 36.5 g/dL    RDW 14.8 10.0 - 15.0 %    Platelet Count 195 150 - 450 10e3/uL    % Neutrophils 81 %    % Lymphocytes 11 %    % Monocytes 4 %    % Eosinophils 4 %    % Basophils 0 %    % Immature Granulocytes 0 %    NRBCs per 100 WBC 0 <1 /100    Absolute " Neutrophils 2.2 1.6 - 8.3 10e3/uL    Absolute Lymphocytes 0.3 (L) 0.8 - 5.3 10e3/uL    Absolute Monocytes 0.1 0.0 - 1.3 10e3/uL    Absolute Eosinophils 0.1 0.0 - 0.7 10e3/uL    Absolute Basophils 0.0 0.0 - 0.2 10e3/uL    Absolute Immature Granulocytes 0.0 <=0.0 10e3/uL    Absolute NRBCs 0.0 10e3/uL   CBC with platelets differential     Status: Abnormal    Narrative    The following orders were created for panel order CBC with platelets differential.  Procedure                               Abnormality         Status                     ---------                               -----------         ------                     CBC with platelets and d...[[id number]]  Abnormal            Final result                 Please view results for these tests on the individual orders.       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  He is being treated per [facility] protocol [id number]. He's had a CRu following Induction with a CR at the end of Consolidation. His course was complicated by extensive bilateral DVT and severe necrotizing pancreatitis requiring cessation of PEG-asparaginase from his treatment.  He comes to clinic today for Day 57 of Cycle 7 of Maintenance therapy. He is currently on 50% full dose of 6MP with allopurinol for correction of skewed 6-MP metabolism and 125% methotrexate. ANC remains above target range.    Plan:   1) Labs reviewed with Lazaro. Increase 6MP to 62.5%= 75mg x 2 d & 100mg x 5d; Continue methotrexate at 125% full dose= 20 tabs. Continue allopurinol.  2) Start prednisone burst today, continue protonix during bursts.  3) IV vincristine in clinic today.  4) Continue to recommend that he take Vit D 1000 IU daily. Refill provided today.  5) Bactrim for PCP prophylaxis.   6) Continue to check TGNs monthly moving forward. Pending from today.  7) Lazaro will need COVID testing prior to LPs moving forward.  He prefers to come the day of,  have it done in sedation and then come to clinic for chemo while he is waiting for results.  His last LP on 11/30 will be coordinated with port removal.  8) Reviewed recommendation for covid vaccine, discussed pharmacies that could see him on a walk in basis.  Recommend he go in about 2.5 weeks from now and get his flu and covid vaccines. Plans to make this appointment.  9) Plan for end of therapy CXR and echo.  10) RTC in 4 weeks for Day 1 of cycle 8 with lawson ringing!    Marie Damon CNP    Total time spent on the following services on the date of the encounter: 50 minutes  Preparing to see patient with chart review    Ordering medications, labs tests, chemotherapy   Communicating with other healthcare professionals and care coordination  Interpretation of labs  Performing a medically appropriate examination   Counseling and educating the patient/family/caregiver   Communicating results to the patient/family/caregiver   Documenting clinical information in the electronic health record

## 2021-11-05 LAB
6-TGN ENTSUB RBC: 888 PMOL/8X10(8)RBC (ref 235–450)
6MMP ENTSUB RBC: <460 PMOL/8X10(8)RBC

## 2021-11-05 RX ORDER — SODIUM CHLORIDE 9 MG/ML
200 INJECTION, SOLUTION INTRAVENOUS CONTINUOUS PRN
Status: CANCELLED | OUTPATIENT
Start: 2021-11-30

## 2021-11-05 RX ORDER — METHYLPREDNISOLONE SODIUM SUCCINATE 125 MG/2ML
125 INJECTION, POWDER, LYOPHILIZED, FOR SOLUTION INTRAMUSCULAR; INTRAVENOUS
Status: CANCELLED | OUTPATIENT
Start: 2021-11-30

## 2021-11-05 RX ORDER — EPINEPHRINE 1 MG/ML
0.3 INJECTION, SOLUTION, CONCENTRATE INTRAVENOUS EVERY 5 MIN PRN
Status: CANCELLED | OUTPATIENT
Start: 2021-11-30

## 2021-11-05 RX ORDER — DIPHENHYDRAMINE HYDROCHLORIDE 50 MG/ML
50 INJECTION INTRAMUSCULAR; INTRAVENOUS
Status: CANCELLED
Start: 2021-11-30

## 2021-11-05 RX ORDER — ALBUTEROL SULFATE 0.83 MG/ML
2.5 SOLUTION RESPIRATORY (INHALATION)
Status: CANCELLED | OUTPATIENT
Start: 2021-11-30

## 2021-11-05 RX ORDER — ALBUTEROL SULFATE 90 UG/1
1-2 AEROSOL, METERED RESPIRATORY (INHALATION)
Status: CANCELLED
Start: 2021-11-30

## 2021-11-27 ENCOUNTER — LAB (OUTPATIENT)
Dept: LAB | Facility: CLINIC | Age: 21
End: 2021-11-27
Payer: COMMERCIAL

## 2021-11-27 DIAGNOSIS — Z11.59 ENCOUNTER FOR SCREENING FOR OTHER VIRAL DISEASES: ICD-10-CM

## 2021-11-29 ENCOUNTER — APPOINTMENT (OUTPATIENT)
Dept: TRANSPLANT | Facility: CLINIC | Age: 21
End: 2021-11-29
Attending: PEDIATRICS
Payer: COMMERCIAL

## 2021-11-29 LAB — SARS-COV-2 RNA RESP QL NAA+PROBE: NEGATIVE

## 2021-11-29 PROCEDURE — U0003 INFECTIOUS AGENT DETECTION BY NUCLEIC ACID (DNA OR RNA); SEVERE ACUTE RESPIRATORY SYNDROME CORONAVIRUS 2 (SARS-COV-2) (CORONAVIRUS DISEASE [COVID-19]), AMPLIFIED PROBE TECHNIQUE, MAKING USE OF HIGH THROUGHPUT TECHNOLOGIES AS DESCRIBED BY CMS-2020-01-R: HCPCS

## 2021-11-29 PROCEDURE — U0005 INFEC AGEN DETEC AMPLI PROBE: HCPCS

## 2021-11-30 ENCOUNTER — HOSPITAL ENCOUNTER (OUTPATIENT)
Dept: GENERAL RADIOLOGY | Facility: CLINIC | Age: 21
End: 2021-11-30
Attending: NURSE PRACTITIONER
Payer: COMMERCIAL

## 2021-11-30 ENCOUNTER — ANESTHESIA (OUTPATIENT)
Dept: PEDIATRICS | Facility: CLINIC | Age: 21
End: 2021-11-30
Payer: COMMERCIAL

## 2021-11-30 ENCOUNTER — HOSPITAL ENCOUNTER (OUTPATIENT)
Facility: CLINIC | Age: 21
Discharge: HOME | End: 2021-11-30
Attending: RADIOLOGY | Admitting: RADIOLOGY
Payer: COMMERCIAL

## 2021-11-30 ENCOUNTER — OFFICE VISIT (OUTPATIENT)
Dept: PEDIATRIC HEMATOLOGY/ONCOLOGY | Facility: CLINIC | Age: 21
End: 2021-11-30
Attending: NURSE PRACTITIONER
Payer: COMMERCIAL

## 2021-11-30 ENCOUNTER — INFUSION THERAPY VISIT (OUTPATIENT)
Dept: INFUSION THERAPY | Facility: CLINIC | Age: 21
End: 2021-11-30
Attending: NURSE PRACTITIONER
Payer: COMMERCIAL

## 2021-11-30 ENCOUNTER — HOSPITAL ENCOUNTER (OUTPATIENT)
Dept: INTERVENTIONAL RADIOLOGY/VASCULAR | Facility: CLINIC | Age: 21
End: 2021-11-30
Attending: NURSE PRACTITIONER
Payer: COMMERCIAL

## 2021-11-30 ENCOUNTER — ANESTHESIA EVENT (OUTPATIENT)
Dept: PEDIATRICS | Facility: CLINIC | Age: 21
End: 2021-11-30
Payer: COMMERCIAL

## 2021-11-30 VITALS
RESPIRATION RATE: 16 BRPM | TEMPERATURE: 98 F | HEART RATE: 79 BPM | HEIGHT: 73 IN | DIASTOLIC BLOOD PRESSURE: 69 MMHG | SYSTOLIC BLOOD PRESSURE: 115 MMHG | WEIGHT: 165.34 LBS | OXYGEN SATURATION: 98 % | BODY MASS INDEX: 21.91 KG/M2

## 2021-11-30 VITALS
SYSTOLIC BLOOD PRESSURE: 83 MMHG | HEART RATE: 59 BPM | OXYGEN SATURATION: 97 % | TEMPERATURE: 98.5 F | RESPIRATION RATE: 18 BRPM | DIASTOLIC BLOOD PRESSURE: 38 MMHG

## 2021-11-30 DIAGNOSIS — C83.50 T LYMPHOBLASTIC LYMPHOMA (H): Primary | ICD-10-CM

## 2021-11-30 DIAGNOSIS — C83.50 T LYMPHOBLASTIC LYMPHOMA (H): ICD-10-CM

## 2021-11-30 LAB
ALBUMIN SERPL-MCNC: 4.2 G/DL (ref 3.4–5)
ALP SERPL-CCNC: 69 U/L (ref 40–150)
ALT SERPL W P-5'-P-CCNC: 21 U/L (ref 0–70)
ANION GAP SERPL CALCULATED.3IONS-SCNC: 4 MMOL/L (ref 3–14)
APPEARANCE CSF: CLEAR
AST SERPL W P-5'-P-CCNC: 13 U/L (ref 0–45)
BASOPHILS # BLD AUTO: 0 10E3/UL (ref 0–0.2)
BASOPHILS NFR BLD AUTO: 0 %
BILIRUB SERPL-MCNC: 0.9 MG/DL (ref 0.2–1.3)
BUN SERPL-MCNC: 20 MG/DL (ref 7–30)
CALCIUM SERPL-MCNC: 9.1 MG/DL (ref 8.5–10.1)
CHLORIDE BLD-SCNC: 107 MMOL/L (ref 94–109)
CO2 SERPL-SCNC: 27 MMOL/L (ref 20–32)
COLOR CSF: COLORLESS
CREAT SERPL-MCNC: 0.67 MG/DL (ref 0.66–1.25)
EOSINOPHIL # BLD AUTO: 0.1 10E3/UL (ref 0–0.7)
EOSINOPHIL NFR BLD AUTO: 5 %
ERYTHROCYTE [DISTWIDTH] IN BLOOD BY AUTOMATED COUNT: 14.1 % (ref 10–15)
ERYTHROCYTE [DISTWIDTH] IN BLOOD BY AUTOMATED COUNT: 14.5 % (ref 10–15)
GFR SERPL CREATININE-BSD FRML MDRD: >90 ML/MIN/1.73M2
GLUCOSE BLD-MCNC: 94 MG/DL (ref 70–99)
HBV SURFACE AG SERPL QL IA: NONREACTIVE
HCT VFR BLD AUTO: 29.7 % (ref 40–53)
HCT VFR BLD AUTO: 31.4 % (ref 40–53)
HGB BLD-MCNC: 10.6 G/DL (ref 13.3–17.7)
HGB BLD-MCNC: 11.2 G/DL (ref 13.3–17.7)
HIV 1+2 AB+HIV1 P24 AG SERPL QL IA: NONREACTIVE
HIV 1+2 AB+HIV1P24 AG SERPLBLD IA.RAPID: NON REACTIVE
HIV 1+2 AB+HIV1P24 AG SERPLBLD IA.RAPID: NON REACTIVE
HIV INTERPRETATION: NORMAL
IMM GRANULOCYTES # BLD: 0 10E3/UL
IMM GRANULOCYTES NFR BLD: 0 %
LYMPH ABN NFR CSF MANUAL: NORMAL %
LYMPHOCYTES # BLD AUTO: 0.3 10E3/UL (ref 0.8–5.3)
LYMPHOCYTES NFR BLD AUTO: 11 %
MCH RBC QN AUTO: 36.6 PG (ref 26.5–33)
MCH RBC QN AUTO: 36.6 PG (ref 26.5–33)
MCHC RBC AUTO-ENTMCNC: 35.7 G/DL (ref 31.5–36.5)
MCHC RBC AUTO-ENTMCNC: 35.7 G/DL (ref 31.5–36.5)
MCV RBC AUTO: 102 FL (ref 78–100)
MCV RBC AUTO: 103 FL (ref 78–100)
MONOCYTES # BLD AUTO: 0.1 10E3/UL (ref 0–1.3)
MONOCYTES NFR BLD AUTO: 6 %
MONOS+MACROS NFR CSF MANUAL: NORMAL %
NEUTROPHILS # BLD AUTO: 1.9 10E3/UL (ref 1.6–8.3)
NEUTROPHILS NFR BLD AUTO: 78 %
NEUTROPHILS NFR CSF MANUAL: NORMAL %
NRBC # BLD AUTO: 0 10E3/UL
NRBC BLD AUTO-RTO: 0 /100
PLATELET # BLD AUTO: 195 10E3/UL (ref 150–450)
PLATELET # BLD AUTO: 197 10E3/UL (ref 150–450)
POTASSIUM BLD-SCNC: 3.8 MMOL/L (ref 3.4–5.3)
PROT SERPL-MCNC: 6.9 G/DL (ref 6.8–8.8)
RBC # BLD AUTO: 2.9 10E6/UL (ref 4.4–5.9)
RBC # BLD AUTO: 3.06 10E6/UL (ref 4.4–5.9)
RBC # CSF MANUAL: 2 /UL (ref 0–2)
SODIUM SERPL-SCNC: 138 MMOL/L (ref 133–144)
TUBE # CSF: 1
WBC # BLD AUTO: 2.5 10E3/UL (ref 4–11)
WBC # BLD AUTO: 2.6 10E3/UL (ref 4–11)
WBC # CSF MANUAL: 0 /UL (ref 0–5)

## 2021-11-30 PROCEDURE — 250N000011 HC RX IP 250 OP 636: Performed by: NURSE ANESTHETIST, CERTIFIED REGISTERED

## 2021-11-30 PROCEDURE — 99215 OFFICE O/P EST HI 40 MIN: CPT | Mod: 25 | Performed by: NURSE PRACTITIONER

## 2021-11-30 PROCEDURE — 999N000141 HC STATISTIC PRE-PROCEDURE NURSING ASSESSMENT: Performed by: NURSE PRACTITIONER

## 2021-11-30 PROCEDURE — 96409 CHEMO IV PUSH SNGL DRUG: CPT

## 2021-11-30 PROCEDURE — 85027 COMPLETE CBC AUTOMATED: CPT | Mod: 91 | Performed by: PHYSICIAN ASSISTANT

## 2021-11-30 PROCEDURE — 89050 BODY FLUID CELL COUNT: CPT | Performed by: NURSE PRACTITIONER

## 2021-11-30 PROCEDURE — 258N000003 HC RX IP 258 OP 636: Performed by: NURSE PRACTITIONER

## 2021-11-30 PROCEDURE — 250N000011 HC RX IP 250 OP 636

## 2021-11-30 PROCEDURE — 71046 X-RAY EXAM CHEST 2 VIEWS: CPT | Mod: 26 | Performed by: RADIOLOGY

## 2021-11-30 PROCEDURE — 36415 COLL VENOUS BLD VENIPUNCTURE: CPT | Performed by: INTERNAL MEDICINE

## 2021-11-30 PROCEDURE — 258N000003 HC RX IP 258 OP 636: Performed by: NURSE ANESTHETIST, CERTIFIED REGISTERED

## 2021-11-30 PROCEDURE — 250N000011 HC RX IP 250 OP 636: Mod: JW | Performed by: NURSE PRACTITIONER

## 2021-11-30 PROCEDURE — 80299 QUANTITATIVE ASSAY DRUG: CPT | Performed by: NURSE PRACTITIONER

## 2021-11-30 PROCEDURE — 370N000017 HC ANESTHESIA TECHNICAL FEE, PER MIN: Performed by: NURSE PRACTITIONER

## 2021-11-30 PROCEDURE — 36590 REMOVAL TUNNELED CV CATH: CPT | Performed by: PHYSICIAN ASSISTANT

## 2021-11-30 PROCEDURE — 250N000009 HC RX 250: Performed by: PHYSICIAN ASSISTANT

## 2021-11-30 PROCEDURE — 250N000009 HC RX 250

## 2021-11-30 PROCEDURE — 96450 CHEMOTHERAPY INTO CNS: CPT | Performed by: NURSE PRACTITIONER

## 2021-11-30 PROCEDURE — 36591 DRAW BLOOD OFF VENOUS DEVICE: CPT | Performed by: NURSE PRACTITIONER

## 2021-11-30 PROCEDURE — 36590 REMOVAL TUNNELED CV CATH: CPT

## 2021-11-30 PROCEDURE — 250N000011 HC RX IP 250 OP 636: Performed by: NURSE PRACTITIONER

## 2021-11-30 PROCEDURE — 85004 AUTOMATED DIFF WBC COUNT: CPT | Performed by: NURSE PRACTITIONER

## 2021-11-30 PROCEDURE — 71046 X-RAY EXAM CHEST 2 VIEWS: CPT

## 2021-11-30 PROCEDURE — 82040 ASSAY OF SERUM ALBUMIN: CPT | Performed by: NURSE PRACTITIONER

## 2021-11-30 PROCEDURE — 999N000131 HC STATISTIC POST-PROCEDURE RECOVERY CARE: Performed by: NURSE PRACTITIONER

## 2021-11-30 RX ORDER — HEPARIN SODIUM,PORCINE 10 UNIT/ML
VIAL (ML) INTRAVENOUS
Status: COMPLETED
Start: 2021-11-30 | End: 2021-11-30

## 2021-11-30 RX ORDER — MERCAPTOPURINE 50 MG/1
TABLET ORAL
Qty: 45 TABLET | Refills: 0 | Status: SHIPPED | OUTPATIENT
Start: 2021-11-30 | End: 2022-01-07

## 2021-11-30 RX ORDER — LIDOCAINE 40 MG/G
CREAM TOPICAL
Status: COMPLETED
Start: 2021-11-30 | End: 2021-11-30

## 2021-11-30 RX ORDER — LIDOCAINE HYDROCHLORIDE 10 MG/ML
1-10 INJECTION, SOLUTION EPIDURAL; INFILTRATION; INTRACAUDAL; PERINEURAL ONCE
Status: COMPLETED | OUTPATIENT
Start: 2021-11-30 | End: 2021-11-30

## 2021-11-30 RX ORDER — SODIUM CHLORIDE, SODIUM LACTATE, POTASSIUM CHLORIDE, CALCIUM CHLORIDE 600; 310; 30; 20 MG/100ML; MG/100ML; MG/100ML; MG/100ML
INJECTION, SOLUTION INTRAVENOUS CONTINUOUS PRN
Status: DISCONTINUED | OUTPATIENT
Start: 2021-11-30 | End: 2021-11-30

## 2021-11-30 RX ORDER — PREDNISONE 10 MG/1
TABLET ORAL
Qty: 40 TABLET | Refills: 0 | Status: SHIPPED | OUTPATIENT
Start: 2021-11-30 | End: 2022-01-07

## 2021-11-30 RX ORDER — ONDANSETRON 2 MG/ML
INJECTION INTRAMUSCULAR; INTRAVENOUS PRN
Status: DISCONTINUED | OUTPATIENT
Start: 2021-11-30 | End: 2021-11-30

## 2021-11-30 RX ORDER — FENTANYL CITRATE 50 UG/ML
INJECTION, SOLUTION INTRAMUSCULAR; INTRAVENOUS PRN
Status: DISCONTINUED | OUTPATIENT
Start: 2021-11-30 | End: 2021-11-30

## 2021-11-30 RX ORDER — LIDOCAINE 40 MG/G
CREAM TOPICAL
Status: DISCONTINUED | OUTPATIENT
Start: 2021-11-30 | End: 2021-11-30 | Stop reason: HOSPADM

## 2021-11-30 RX ORDER — PROPOFOL 10 MG/ML
INJECTION, EMULSION INTRAVENOUS PRN
Status: DISCONTINUED | OUTPATIENT
Start: 2021-11-30 | End: 2021-11-30

## 2021-11-30 RX ORDER — PROPOFOL 10 MG/ML
INJECTION, EMULSION INTRAVENOUS CONTINUOUS PRN
Status: DISCONTINUED | OUTPATIENT
Start: 2021-11-30 | End: 2021-11-30

## 2021-11-30 RX ADMIN — LIDOCAINE HYDROCHLORIDE 4 ML: 10 INJECTION, SOLUTION EPIDURAL; INFILTRATION; INTRACAUDAL; PERINEURAL at 13:11

## 2021-11-30 RX ADMIN — MIDAZOLAM 2 MG: 1 INJECTION INTRAMUSCULAR; INTRAVENOUS at 12:12

## 2021-11-30 RX ADMIN — PROPOFOL 250 MCG/KG/MIN: 10 INJECTION, EMULSION INTRAVENOUS at 12:12

## 2021-11-30 RX ADMIN — LIDOCAINE: 40 CREAM TOPICAL at 13:34

## 2021-11-30 RX ADMIN — PROPOFOL 100 MG: 10 INJECTION, EMULSION INTRAVENOUS at 12:12

## 2021-11-30 RX ADMIN — METHOTREXATE: 25 INJECTION INTRA-ARTERIAL; INTRAMUSCULAR; INTRATHECAL; INTRAVENOUS at 12:08

## 2021-11-30 RX ADMIN — Medication 50 UNITS: at 10:29

## 2021-11-30 RX ADMIN — ONDANSETRON 4 MG: 2 INJECTION INTRAMUSCULAR; INTRAVENOUS at 12:50

## 2021-11-30 RX ADMIN — SODIUM CHLORIDE, POTASSIUM CHLORIDE, SODIUM LACTATE AND CALCIUM CHLORIDE: 600; 310; 30; 20 INJECTION, SOLUTION INTRAVENOUS at 12:12

## 2021-11-30 RX ADMIN — VINCRISTINE SULFATE 2 MG: 1 INJECTION, SOLUTION INTRAVENOUS at 10:18

## 2021-11-30 RX ADMIN — Medication 12 MCG: at 12:48

## 2021-11-30 RX ADMIN — FENTANYL CITRATE 50 MCG: 50 INJECTION, SOLUTION INTRAMUSCULAR; INTRAVENOUS at 12:16

## 2021-11-30 ASSESSMENT — MIFFLIN-ST. JEOR: SCORE: 1793.75

## 2021-11-30 ASSESSMENT — PAIN SCALES - GENERAL: PAINLEVEL: NO PAIN (0)

## 2021-11-30 NOTE — PROGRESS NOTES
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 Lazaro presented with acute onset cough and was found to have an anterior mediastinal mass and malignant left-sided pleural effusion.  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  He was admitted to [facility] oncology service 8/30 and started on treatment per [id number].  He had a pleural effusion that required a chest tube and a pericardial effusion that was not drained. His Induction was complicated by cardiac compression secondary to his mass leading to tamponade, this improved through out his hospital stay.  He also had dysphagia that improved with treatment of his mass, swallow study was normal. His course was complicated by extensive bilateral UE DVTs, and he was successfully treated with anticoagulation. His consolidation course was complicated by the development of severe asparaginase induced necrotizing pancreatitis. He became quite ill with SIRS and associated hypotension, he required pressor support in the ICU. As a result, asparaginase was eliminated from his treatment plan. He was in CR status at end of consolidation. During maintenance, he developed hepatotoxicity so allopurinol and dose reduced 6MP were started for correction of skewed 6-MP metabolism.  Lazaro is being treated per [facility] protocol [id number] and comes to clinic today for Day 1 of Cycle 9 of Maintenance which is his last cycle!     HPI:   Lazaro continues to do very well. He did develop a cough that lasted about 2 weeks and was accompanied by a tight and full feeling in his chest.  This has improved over the past day or so but did remind him a little bit of symptoms when he was diagnosed and had a mediastinal mass.  He has not had fever.  No orthopnea or SOB.      Lazaro's energy level is pretty good.  He is eating well, no GI upset.  No skin concerns, no  pain.  He has some questions about the end of therapy and what to expect.     Review of systems:  The 10 point Review of Systems is negative other than noted in the HPI or here.      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 Former daily marajuana smoker, now only uses occasionally    PFMH:   Family History   Problem Relation Age of Onset     No Known Problems Mother      No Known Problems Father      Asthma Brother      Thyroid Cancer Paternal Grandmother      Melanoma Paternal Aunt        Social History:   Social History     Social History Narrative    Lazaro previously lived at home with his dad and step mom in [place] but is now staying with his grandma in [place]. Biological mother is involved in his life. Has 4 step-siblings and 1 biological sibling. 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 Has completed high school. 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  He has been enjoying fishing and video games.        Current Medications:  Current Outpatient Medications   Medication Sig Dispense Refill     allopurinol (ZYLOPRIM) 100 MG tablet Take 1 tablet (100 mg) by mouth daily 30 tablet 11     diphenhydrAMINE (BENADRYL) 25 MG capsule Take  1-2 capsules (25-50 mg) by mouth every 6 hours as needed (Breakthrough Nausea and Vomiting ) (Patient not taking: Reported on 11/2/2021) 30 capsule 1     lidocaine-prilocaine (EMLA) 2.5-2.5 % external cream Apply topically as needed for other (prior to port access) 30 g 6     magic mouthwash suspension (diphenhydrAMINE, lidocaine, aluminum-magnesium & simethicone) Swish and spit 10 mLs in mouth 4 times daily (with meals and nightly) (Patient not taking: Reported on 11/2/2021) 120 mL 2     mercaptopurine (PURINETHOL) 50 MG tablet Take 75mg (1.5 tablets) x 2 days per week & 100mg (2 tablets) x 5 days per week. 54 tablet 0     methotrexate 2.5 MG tablet Take 20 tablets (50 mg) by mouth once a week Do not take on Days of LP. 80 tablet 2     ondansetron (ZOFRAN-ODT) 8 MG ODT tab Take 1 tablet (8 mg) by mouth every 8 hours as needed for nausea (Patient not taking: Reported on 11/2/2021) 20 tablet 6     pantoprazole (PROTONIX) 40 MG EC tablet Take 1 tablet (40 mg) by mouth every morning (before breakfast) During weeks of taking prednisone. 30 tablet 2     polyethylene glycol (MIRALAX/GLYCOLAX) packet Take 17 g by mouth daily (Patient not taking: Reported on 10/5/2021) 30 packet 0     predniSONE (DELTASONE) 10 MG tablet Take 40mg (4 tabs) twice dialy for 5 days every 4 weeks. 40 tablet 2     sennosides (SENOKOT) 8.6 MG tablet Take 2 tablets by mouth 2 times daily as needed for constipation (Patient not taking: Reported on 11/2/2021) 60 each 1     sulfamethoxazole-trimethoprim (BACTRIM DS) 800-160 MG tablet Take 1 tablet by mouth Every Mon, Tues two times daily 16 tablet 11     Vitamin D, Cholecalciferol, 25 MCG (1000 UT) TABS Take 1 tablet (1,000 Units) by mouth daily 30 tablet 3   Reviewed medications with Lazaro. Confirmed oral chemo doses, and notes no missed doses.     Physical Exam:   Temp:  [98  F (36.7  C)] 98  F (36.7  C)  Pulse:  [79] 79  Resp:  [16] 16  BP: (115)/(69) 115/69  SpO2:  [98 %] 98 %  Wt Readings from  "Last 4 Encounters:   11/30/21 75 kg (165 lb 5.5 oz)   11/02/21 79.7 kg (175 lb 11.3 oz)   10/05/21 79.4 kg (175 lb 0.7 oz)   09/07/21 80.7 kg (177 lb 14.6 oz)     Ht Readings from Last 2 Encounters:   11/30/21 1.846 m (6' 0.68\")   11/02/21 1.841 m (6' 0.48\")     General: Lazaro is alert, interactive and appropriate, well-appearing, in no distress  HEENT: Skull is atrauamatic and normocephalic.  Full head of hair. PERRLA, sclera are non icteric and not injected, EOM are intact. Nares are patent without drainage. Oropharynx clear, no plaques noted. No mucositis. MMM.  Neck:  Supple without lymphadenopathy.   Lymph: There is no cervical, supraclavicular, axillary, lymphadenopathy palpated.  Cardiovascular:  HR is regular, S1, S2 no murmur.  Capillary refill is < 2 seconds.   Respiratory: No cough noted. Respirations are easy. Lungs are clear to auscultation throughout.  No crackles or wheezes.  Gastrointestinal: BS present in all quadrants.  Abdomen is soft and flat.  No pain with palpation. No hepatosplenomegaly or masses are palpated.  Skin: No concerning lesions. No rashes, bruises.  Neurological:  CN 2-12 grossly intact. Gait is normal.  No issues with balance. Sensation intact in hands and feet.     Musculoskeletal: Good strength and ROM in all extremities.  Strong dorsiflexion at ankles and great toes (5/5) bilaterally without any pain at the Achilles.     Labs:   Results for orders placed or performed during the hospital encounter of 11/30/21   X-ray Chest 2 vws*     Status: None    Narrative    XR CHEST 2 VW  11/30/2021 11:17 AM      HISTORY: T cell lymphoma; T lymphoblastic lymphoma (H)    COMPARISON: 1/26/2021    FINDINGS: Right IJ chest port tip terminates at the low SVC. Cardiac  silhouette is within normal limits. No acute airspace opacities,  pleural effusion, or pneumothorax. No acute osseous abnormality.      Impression    IMPRESSION: No focal airspace opacities.    I have personally reviewed the " examination and initial interpretation  and I agree with the findings.    BHAVYA CASTRO MD         SYSTEM ID:  [id number]   Results for orders placed or performed in visit on 11/30/21   Comprehensive metabolic panel     Status: Normal   Result Value Ref Range    Sodium 138 133 - 144 mmol/L    Potassium 3.8 3.4 - 5.3 mmol/L    Chloride 107 94 - 109 mmol/L    Carbon Dioxide (CO2) 27 20 - 32 mmol/L    Anion Gap 4 3 - 14 mmol/L    Urea Nitrogen 20 7 - 30 mg/dL    Creatinine 0.67 0.66 - 1.25 mg/dL    Calcium 9.1 8.5 - 10.1 mg/dL    Glucose 94 70 - 99 mg/dL    Alkaline Phosphatase 69 40 - 150 U/L    AST 13 0 - 45 U/L    ALT 21 0 - 70 U/L    Protein Total 6.9 6.8 - 8.8 g/dL    Albumin 4.2 3.4 - 5.0 g/dL    Bilirubin Total 0.9 0.2 - 1.3 mg/dL    GFR Estimate >90 >60 mL/min/1.73m2   CBC with platelets and differential     Status: Abnormal   Result Value Ref Range    WBC Count 2.5 (L) 4.0 - 11.0 10e3/uL    RBC Count 3.06 (L) 4.40 - 5.90 10e6/uL    Hemoglobin 11.2 (L) 13.3 - 17.7 g/dL    Hematocrit 31.4 (L) 40.0 - 53.0 %     (H) 78 - 100 fL    MCH 36.6 (H) 26.5 - 33.0 pg    MCHC 35.7 31.5 - 36.5 g/dL    RDW 14.5 10.0 - 15.0 %    Platelet Count 195 150 - 450 10e3/uL    % Neutrophils 78 %    % Lymphocytes 11 %    % Monocytes 6 %    % Eosinophils 5 %    % Basophils 0 %    % Immature Granulocytes 0 %    NRBCs per 100 WBC 0 <1 /100    Absolute Neutrophils 1.9 1.6 - 8.3 10e3/uL    Absolute Lymphocytes 0.3 (L) 0.8 - 5.3 10e3/uL    Absolute Monocytes 0.1 0.0 - 1.3 10e3/uL    Absolute Eosinophils 0.1 0.0 - 0.7 10e3/uL    Absolute Basophils 0.0 0.0 - 0.2 10e3/uL    Absolute Immature Granulocytes 0.0 <=0.0 10e3/uL    Absolute NRBCs 0.0 10e3/uL   CBC with platelets differential     Status: Abnormal    Narrative    The following orders were created for panel order CBC with platelets differential.  Procedure                               Abnormality         Status                     ---------                               -----------          ------                     CBC with platelets and d...[[id number]]  Abnormal            Final result                 Please view results for these tests on the individual orders.     Imaging:  Recent Results (from the past 24 hour(s))   X-ray Chest 2 vws*    Narrative    XR CHEST 2 VW  11/30/2021 11:17 AM      HISTORY: T cell lymphoma; T lymphoblastic lymphoma (H)    COMPARISON: 1/26/2021    FINDINGS: Right IJ chest port tip terminates at the low SVC. Cardiac  silhouette is within normal limits. No acute airspace opacities,  pleural effusion, or pneumothorax. No acute osseous abnormality.      Impression    IMPRESSION: No focal airspace opacities.    I have personally reviewed the examination and initial interpretation  and I agree with the findings.    BHAVYA CASTRO MD         SYSTEM ID:  [id number]       Procedure:  A Lumbar Puncture was performed in the Pediatric Sedation Suite. Informed consent was obtained prior to the procedure. Lazaro Lund was identified by facial recognition and ID arm band. A time-out was performed. Lazaro Lund was then placed in the left lateral decubitus position and the lumbosacral area was sterily prepped using Chloraprep followed by drape placement. Anatomic landmarks were identified by palpation. Then, a 22 gauge, 3.5 inch lidna spinal needle was easily inserted into the L4/L5 interspace. On the first attempt approximately 3 mL of clear and colorless cerebrospinal fluid was obtained to be sent to the lab for cell count analysis and cytospin. Following that, 15mg of intrathecal methotrexate in 6 mL of preservative-free normal saline was infused without resistance. The needle was removed and a Band-Aid applied. Lazaro Lund tolerated this procedure very well.      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  He is being treated per [facility] protocol [id number]. He's had a CRu following Induction with  a CR at the end of Consolidation. His course was complicated by extensive bilateral DVT and severe necrotizing pancreatitis requiring cessation of PEG-asparaginase from his treatment.  He comes to clinic today for Day 1 of Cycle 8 of Maintenance therapy. This is his last cycle of therapy! He is currently on 50% full dose of 6MP with allopurinol for correction of skewed 6-MP metabolism and 125% methotrexate. ANC remains above target range but he was escalated last month.    Plan:   1) Labs reviewed with Lazaro. Continue current doses of chemo and allopurinol.  2) Start prednisone burst today, continue protonix during bursts.  3) IV vincristine in clinic today, last dose!  4) Continue to recommend that he take Vit D 1000 IU daily.   5) Bactrim for PCP prophylaxis, will need to continue until he is 3 months off therapy.   6) Port removed today however Lazaro will need to seek care for any fever until his first off therapy visit.  7) Lazaro still needs COVID and influenza vaccines, he's had difficulty making an appt.  Plan to give at his first follow up visit since he'll be off chemo including prednisone at that time.  8) End of therapy chest xray is clear, will order end of therapy echo for next month.  9) Last day of oral chemo is 12/23/21!  10) RTC in 1 month for first off therapy visit.    Félix Van CNP    Total time spent on the following services on the date of the encounter: 55 minutes  Preparing to see patient with chart review    Ordering medications, labs tests, chemotherapy   Communicating with other healthcare professionals and care coordination  Interpretation of labs  Performing a medically appropriate examination   Counseling and educating the patient/family/caregiver   Communicating results to the patient/family/caregiver   Documenting clinical information in the electronic health record

## 2021-11-30 NOTE — PROGRESS NOTES
Infusion Nursing Note    Lazaro PLUNKETT Kalerobbin presents to [facility] today for: Vincristine    Due to : T lymphoblastic lymphoma (H)    Intravenous Access/Labs: Port accessed using sterile technique. Labs drawn per orders. Port left accessed for LP.    Coping:   Child Family Life declined    Infusion Note: Vincristine given to gravity. Blood return noted pre/mid/post infusion. Port heparin locked and left accessed for LP in sedation. Seen by Luana PENALOZA NP during visit.    Discharge Plan:   Patient verbalized understanding of discharge instructions. Pt left [facility] in stable condition.

## 2021-11-30 NOTE — DISCHARGE INSTRUCTIONS
Home Instructions for Your Child after Sedation  Today your child received (medicine):  Propofol, Fentanyl, Versed, Precedex and Zofran  Please keep this form with your health records  Your child may be more sleepy and irritable today than normal. Also, an adult should stay with your child for the rest of the day. The medicine may make the child dizzy. Avoid activities that require balance (bike riding, skating, climbing stairs, walking).  Remember:    When your child wants to eat again, start with liquids (juice, soda pop, Popsicles). If your child feels well enough, you may try a regular diet. It is best to offer light meals for the first 24 hours.    If your child has nausea (feels sick to the stomach) or vomiting (throws up), give small amounts of clear liquids (7-Up, Sprite, apple juice or broth). Fluids are more important than food until your child is feeling better.    Wait 24 hours before giving medicine that contains alcohol. This includes liquid cold, cough and allergy medicines (Robitussin, Vicks Formula 44 for children, Benadryl, Chlor-Trimeton).    If you will leave your child with a , give the sitter a copy of these instructions.  Call your doctor if:    You have questions about the test results.    Your child vomits (throws up) more than two times.    Your child is very fussy or irritable.    You have trouble waking your child.     If your child has trouble breathing, call [phone].  If you have any questions or concerns, please call:  Pediatric Sedation Unit [phone]  Pediatric clinic  [phone]  [facility]  [phone] (ask for the Pediatric Anesthesiologist doctor on call)  Emergency department [phone]  [facility] toll-free number [phone] (Monday--Friday, 8 a.m. to 4:30 p.m.)  I understand these instructions. I have all of my personal belongings.      [facility]   [phone]    Care post Lumbar Puncture     Do not remove bandage/dressing for 24 hours --  after this time they can be removed    No bath, shower or soaking of the dressing for 24 hours    Activity as tolerated by the patient    Diet as able to tolerated    May use Tylenol as needed for pain control -- DO NOT use Ibuprofen    Can apply icepack to the site for discomfort -- no more than 10 minutes at a time    If bleeding presents apply pressure for 5 minutes    Call [phone] ask for Peds BMT/Hem/Onc fellow on call if complications arise including:    persistent bleeding    fever greater than 100.5    Pain    Lumbar punctures can cause headache. If the pain is not controlled with Tylenol (acetaminophen) please call the Peds BMT/Hem/Onc fellow on call    [facility]  Pediatric Interventional Radiology  Discharge Instructions for Implanted Port Removal    Date of Procedure: 11/30/2021      Today you had an Implanted Port Removed.      Activity    No strenuous activity for 1 week    No heavy lifting (greater than 10 pounds) for 3 days     No tub bath, hot tub, or swimming until Dermabond (skin glue) is no longer there (about 7-10 days)    Diet    Resume your regular diet    Discomfort    Pain medications as directed    Site Care           Your site(s) has been closed with Dermabond (skin glue). This thin layer will slough off in 7-10 days.       If there is any oozing or bleeding from the site, apply direct pressure for 5-10 minutes with a gauze pad.  If bleeding continues after 10 minutes, call Pediatric Interventional Radiology.  If bleeding cannot be controlled with direct pressure, call [phone].      It is OK to shower and get the incision wet, but immediately pat it dry       If sedation was given:    DO NOT drive or operate heavy machinery for 24 hours    DO NOT drink alcoholic beverages for 24 hours    DO NOT make important legal decisions for 24 hours    You must have a responsible adult to drive you home and stay with you for 24 hours    Call your Doctor  if:    Bleeding    Swelling in your neck or arm    Fever greater than 100.5 degrees F (oral)    Other signs of infection such as redness, tenderness, or drainage from the wound    If you have questions or concerns about this procedure:  Pediatric Interventional Radiology [phone] Mon-Fri, 7am to 5pm    [phone] After-hours, weekends, holidays  Ask for the Pediatric Interventional Radiologist on-call

## 2021-11-30 NOTE — LETTER
11/30/2021      RE: Lazaro Lund  [address] 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 Lazaro presented with acute onset cough and was found to have an anterior mediastinal mass and malignant left-sided pleural effusion.  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  He was admitted to [facility] oncology service 8/30 and started on treatment per [id number].  He had a pleural effusion that required a chest tube and a pericardial effusion that was not drained. His Induction was complicated by cardiac compression secondary to his mass leading to tamponade, this improved through out his hospital stay.  He also had dysphagia that improved with treatment of his mass, swallow study was normal. His course was complicated by extensive bilateral UE DVTs, and he was successfully treated with anticoagulation. His consolidation course was complicated by the development of severe asparaginase induced necrotizing pancreatitis. He became quite ill with SIRS and associated hypotension, he required pressor support in the ICU. As a result, asparaginase was eliminated from his treatment plan. He was in CR status at end of consolidation. During maintenance, he developed hepatotoxicity so allopurinol and dose reduced 6MP were started for correction of skewed 6-MP metabolism.  Lazaro is being treated per COG protocol [id number] and comes to clinic today for Day 1 of Cycle 9 of Maintenance which is his last cycle!     HPI:   Lazaro continues to do very well. He did develop a cough that lasted about 2 weeks and was accompanied by a tight and full feeling in his chest.  This has improved over the past day or so but did remind him a little bit of symptoms when he was diagnosed and had a mediastinal mass.  He has not had fever.  No orthopnea or SOB.       Lazaro's energy level is pretty good.  He is eating well, no GI upset.  No skin concerns, no pain.  He has some questions about the end of therapy and what to expect.     Review of systems:  The 10 point Review of Systems is negative other than noted in the HPI or here.      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 Former daily marajuana smoker, now only uses occasionally    PFMH:   Family History   Problem Relation Age of Onset     No Known Problems Mother      No Known Problems Father      Asthma Brother      Thyroid Cancer Paternal Grandmother      Melanoma Paternal Aunt        Social History:   Social History     Social History Narrative    Lazaro previously lived at home with his dad and step mom in [place] but is now staying with his grandma in [place]. Biological mother is involved in his life. Has 4 step-siblings and 1 biological sibling. 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 Has completed high school. 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  He has been enjoying fishing and video games.        Current Medications:  Current Outpatient Medications   Medication Sig Dispense Refill     allopurinol (ZYLOPRIM) 100 MG tablet Take  1 tablet (100 mg) by mouth daily 30 tablet 11     diphenhydrAMINE (BENADRYL) 25 MG capsule Take 1-2 capsules (25-50 mg) by mouth every 6 hours as needed (Breakthrough Nausea and Vomiting ) (Patient not taking: Reported on 11/2/2021) 30 capsule 1     lidocaine-prilocaine (EMLA) 2.5-2.5 % external cream Apply topically as needed for other (prior to port access) 30 g 6     magic mouthwash suspension (diphenhydrAMINE, lidocaine, aluminum-magnesium & simethicone) Swish and spit 10 mLs in mouth 4 times daily (with meals and nightly) (Patient not taking: Reported on 11/2/2021) 120 mL 2     mercaptopurine (PURINETHOL) 50 MG tablet Take 75mg (1.5 tablets) x 2 days per week & 100mg (2 tablets) x 5 days per week. 54 tablet 0     methotrexate 2.5 MG tablet Take 20 tablets (50 mg) by mouth once a week Do not take on Days of LP. 80 tablet 2     ondansetron (ZOFRAN-ODT) 8 MG ODT tab Take 1 tablet (8 mg) by mouth every 8 hours as needed for nausea (Patient not taking: Reported on 11/2/2021) 20 tablet 6     pantoprazole (PROTONIX) 40 MG EC tablet Take 1 tablet (40 mg) by mouth every morning (before breakfast) During weeks of taking prednisone. 30 tablet 2     polyethylene glycol (MIRALAX/GLYCOLAX) packet Take 17 g by mouth daily (Patient not taking: Reported on 10/5/2021) 30 packet 0     predniSONE (DELTASONE) 10 MG tablet Take 40mg (4 tabs) twice dialy for 5 days every 4 weeks. 40 tablet 2     sennosides (SENOKOT) 8.6 MG tablet Take 2 tablets by mouth 2 times daily as needed for constipation (Patient not taking: Reported on 11/2/2021) 60 each 1     sulfamethoxazole-trimethoprim (BACTRIM DS) 800-160 MG tablet Take 1 tablet by mouth Every Mon, Tues two times daily 16 tablet 11     Vitamin D, Cholecalciferol, 25 MCG (1000 UT) TABS Take 1 tablet (1,000 Units) by mouth daily 30 tablet 3   Reviewed medications with Lazaro. Confirmed oral chemo doses, and notes no missed doses.     Physical Exam:   Temp:  [98  F (36.7  C)] 98  F (36.7  " C)  Pulse:  [79] 79  Resp:  [16] 16  BP: (115)/(69) 115/69  SpO2:  [98 %] 98 %  Wt Readings from Last 4 Encounters:   11/30/21 75 kg (165 lb 5.5 oz)   11/02/21 79.7 kg (175 lb 11.3 oz)   10/05/21 79.4 kg (175 lb 0.7 oz)   09/07/21 80.7 kg (177 lb 14.6 oz)     Ht Readings from Last 2 Encounters:   11/30/21 1.846 m (6' 0.68\")   11/02/21 1.841 m (6' 0.48\")     General: Lazaro is alert, interactive and appropriate, well-appearing, in no distress  HEENT: Skull is atrauamatic and normocephalic.  Full head of hair. PERRLA, sclera are non icteric and not injected, EOM are intact. Nares are patent without drainage. Oropharynx clear, no plaques noted. No mucositis. MMM.  Neck:  Supple without lymphadenopathy.   Lymph: There is no cervical, supraclavicular, axillary, lymphadenopathy palpated.  Cardiovascular:  HR is regular, S1, S2 no murmur.  Capillary refill is < 2 seconds.   Respiratory: No cough noted. Respirations are easy. Lungs are clear to auscultation throughout.  No crackles or wheezes.  Gastrointestinal: BS present in all quadrants.  Abdomen is soft and flat.  No pain with palpation. No hepatosplenomegaly or masses are palpated.  Skin: No concerning lesions. No rashes, bruises.  Neurological:  CN 2-12 grossly intact. Gait is normal.  No issues with balance. Sensation intact in hands and feet.     Musculoskeletal: Good strength and ROM in all extremities.  Strong dorsiflexion at ankles and great toes (5/5) bilaterally without any pain at the Achilles.     Labs:   Results for orders placed or performed during the hospital encounter of 11/30/21   X-ray Chest 2 vws*     Status: None    Narrative    XR CHEST 2 VW  11/30/2021 11:17 AM      HISTORY: T cell lymphoma; T lymphoblastic lymphoma (H)    COMPARISON: 1/26/2021    FINDINGS: Right IJ chest port tip terminates at the low SVC. Cardiac  silhouette is within normal limits. No acute airspace opacities,  pleural effusion, or pneumothorax. No acute osseous abnormality.   "    Impression    IMPRESSION: No focal airspace opacities.    I have personally reviewed the examination and initial interpretation  and I agree with the findings.    BHAVYA CASTRO MD         SYSTEM ID:  [id number]   Results for orders placed or performed in visit on 11/30/21   Comprehensive metabolic panel     Status: Normal   Result Value Ref Range    Sodium 138 133 - 144 mmol/L    Potassium 3.8 3.4 - 5.3 mmol/L    Chloride 107 94 - 109 mmol/L    Carbon Dioxide (CO2) 27 20 - 32 mmol/L    Anion Gap 4 3 - 14 mmol/L    Urea Nitrogen 20 7 - 30 mg/dL    Creatinine 0.67 0.66 - 1.25 mg/dL    Calcium 9.1 8.5 - 10.1 mg/dL    Glucose 94 70 - 99 mg/dL    Alkaline Phosphatase 69 40 - 150 U/L    AST 13 0 - 45 U/L    ALT 21 0 - 70 U/L    Protein Total 6.9 6.8 - 8.8 g/dL    Albumin 4.2 3.4 - 5.0 g/dL    Bilirubin Total 0.9 0.2 - 1.3 mg/dL    GFR Estimate >90 >60 mL/min/1.73m2   CBC with platelets and differential     Status: Abnormal   Result Value Ref Range    WBC Count 2.5 (L) 4.0 - 11.0 10e3/uL    RBC Count 3.06 (L) 4.40 - 5.90 10e6/uL    Hemoglobin 11.2 (L) 13.3 - 17.7 g/dL    Hematocrit 31.4 (L) 40.0 - 53.0 %     (H) 78 - 100 fL    MCH 36.6 (H) 26.5 - 33.0 pg    MCHC 35.7 31.5 - 36.5 g/dL    RDW 14.5 10.0 - 15.0 %    Platelet Count 195 150 - 450 10e3/uL    % Neutrophils 78 %    % Lymphocytes 11 %    % Monocytes 6 %    % Eosinophils 5 %    % Basophils 0 %    % Immature Granulocytes 0 %    NRBCs per 100 WBC 0 <1 /100    Absolute Neutrophils 1.9 1.6 - 8.3 10e3/uL    Absolute Lymphocytes 0.3 (L) 0.8 - 5.3 10e3/uL    Absolute Monocytes 0.1 0.0 - 1.3 10e3/uL    Absolute Eosinophils 0.1 0.0 - 0.7 10e3/uL    Absolute Basophils 0.0 0.0 - 0.2 10e3/uL    Absolute Immature Granulocytes 0.0 <=0.0 10e3/uL    Absolute NRBCs 0.0 10e3/uL   CBC with platelets differential     Status: Abnormal    Narrative    The following orders were created for panel order CBC with platelets differential.  Procedure                                Abnormality         Status                     ---------                               -----------         ------                     CBC with platelets and d...[[id number]]  Abnormal            Final result                 Please view results for these tests on the individual orders.     Imaging:  Recent Results (from the past 24 hour(s))   X-ray Chest 2 vws*    Narrative    XR CHEST 2 VW  11/30/2021 11:17 AM      HISTORY: T cell lymphoma; T lymphoblastic lymphoma (H)    COMPARISON: 1/26/2021    FINDINGS: Right IJ chest port tip terminates at the low SVC. Cardiac  silhouette is within normal limits. No acute airspace opacities,  pleural effusion, or pneumothorax. No acute osseous abnormality.      Impression    IMPRESSION: No focal airspace opacities.    I have personally reviewed the examination and initial interpretation  and I agree with the findings.    BHAVYA CASTRO MD         SYSTEM ID:  [id number]       Procedure:  A Lumbar Puncture was performed in the Pediatric Sedation Suite. Informed consent was obtained prior to the procedure. Lazaro Lund was identified by facial recognition and ID arm band. A time-out was performed. Lazaro Lund was then placed in the left lateral decubitus position and the lumbosacral area was sterily prepped using Chloraprep followed by drape placement. Anatomic landmarks were identified by palpation. Then, a 22 gauge, 3.5 inch linda spinal needle was easily inserted into the L4/L5 interspace. On the first attempt approximately 3 mL of clear and colorless cerebrospinal fluid was obtained to be sent to the lab for cell count analysis and cytospin. Following that, 15mg of intrathecal methotrexate in 6 mL of preservative-free normal saline was infused without resistance. The needle was removed and a Band-Aid applied. Lazaro Lund tolerated this procedure very well.      Assessment:  Lazaro Lund is a 21 year old young man with T cell lymphoblastic lymphoma (marrow and CNS  negative).  He is being treated per [facility] protocol [id number]. He's had a CRu following Induction with a CR at the end of Consolidation. His course was complicated by extensive bilateral DVT and severe necrotizing pancreatitis requiring cessation of PEG-asparaginase from his treatment.  He comes to clinic today for Day 1 of Cycle 8 of Maintenance therapy. This is his last cycle of therapy! He is currently on 50% full dose of 6MP with allopurinol for correction of skewed 6-MP metabolism and 125% methotrexate. ANC remains above target range but he was escalated last month.    Plan:   1) Labs reviewed with Lazaro. Continue current doses of chemo and allopurinol.  2) Start prednisone burst today, continue protonix during bursts.  3) IV vincristine in clinic today, last dose!  4) Continue to recommend that he take Vit D 1000 IU daily.   5) Bactrim for PCP prophylaxis, will need to continue until he is 3 months off therapy.   6) Port removed today however Lazaro will need to seek care for any fever until his first off therapy visit.  7) Lazaro still needs COVID and influenza vaccines, he's had difficulty making an appt.  Plan to give at his first follow up visit since he'll be off chemo including prednisone at that time.  8) End of therapy chest xray is clear, will order end of therapy echo for next month.  9) Last day of oral chemo is 12/23/21!  10) RTC in 1 month for first off therapy visit.    Félix Van CNP    Total time spent on the following services on the date of the encounter: 55 minutes  Preparing to see patient with chart review    Ordering medications, labs tests, chemotherapy   Communicating with other healthcare professionals and care coordination  Interpretation of labs  Performing a medically appropriate examination   Counseling and educating the patient/family/caregiver   Communicating results to the patient/family/caregiver   Documenting clinical information in the electronic health record         Félix  YOHAN Jay CNP

## 2021-11-30 NOTE — OR NURSING
Pt adequate for discharge per Anesthesia, Dr. Martinez. Exposure panel Labs drawn following procedure in recovery and sent to lab. Minimum volumes sent and writer spoke with main lab regarding volumes and instructed to send what we were able to draw. Pt alert and eating and drinking. Discharge to home with family.

## 2021-11-30 NOTE — ANESTHESIA CARE TRANSFER NOTE
Patient: Lazaro Lund    Procedure: Procedure(s):  Lumbar puncture with intrathecal Chemotherapy (not CD)  REMOVAL, VASCULAR ACCESS PORT       Diagnosis: Lymphoma (H) [C85.90]  Diagnosis Additional Information: No value filed.    Anesthesia Type:   General     Note:      Level of Consciousness: awake  Oxygen Supplementation: room air    Independent Airway: airway patency satisfactory and stable        Patient transferred to:  Recovery    Handoff Report: Identifed the Patient, Identified the Reponsible Provider, Reviewed the pertinent medical history, Discussed the surgical course, Reviewed Intra-OP anesthesia mangement and issues during anesthesia, Set expectations for post-procedure period and Allowed opportunity for questions and acknowledgement of understanding      Vitals:  Vitals Value Taken Time   /48 11/30/21 1340   Temp 36.7  C (98  F) 11/30/21 1340   Pulse 55 11/30/21 1340   Resp 18 11/30/21 1340   SpO2 100 % 11/30/21 1340       Electronically Signed By: Jyoti Martinez MD  November 30, 2021  2:13 PM

## 2021-11-30 NOTE — ANESTHESIA POSTPROCEDURE EVALUATION
Patient: Lazaro Lund    Procedure: Procedure(s):  Lumbar puncture with intrathecal Chemotherapy (not CD)  REMOVAL, VASCULAR ACCESS PORT       Diagnosis:Lymphoma (H) [C85.90]  Diagnosis Additional Information: No value filed.    Anesthesia Type:  General    Note:  Disposition: Outpatient   Postop Pain Control: Uneventful            Sign Out: Well controlled pain   PONV: No   Neuro/Psych: Uneventful            Sign Out: Acceptable/Baseline neuro status   Airway/Respiratory: Uneventful            Sign Out: Acceptable/Baseline resp. status   CV/Hemodynamics: Uneventful            Sign Out: Acceptable CV status; No obvious hypovolemia; No obvious fluid overload   Other NRE: NONE   DID A NON-ROUTINE EVENT OCCUR? No           Last vitals:  Vitals Value Taken Time   /48 11/30/21 1340   Temp 36.7  C (98  F) 11/30/21 1340   Pulse 55 11/30/21 1340   Resp 18 11/30/21 1340   SpO2 100 % 11/30/21 1340       Electronically Signed By: Jyoti Martinez MD  November 30, 2021  2:12 PM

## 2021-11-30 NOTE — ANESTHESIA PREPROCEDURE EVALUATION
Anesthesia Pre-Procedure Evaluation    Patient: Lazaro Lund   MRN:     [id number] Gender:   male   Age:    21 year old :      2000        Preoperative Diagnosis: Lymphoma (H) [C85.90]   Procedure(s):  Lumbar puncture with intrathecal Chemotherapy (not CD)  REMOVAL, VASCULAR ACCESS PORT     LABS:  CBC:   Lab Results   Component Value Date    WBC 2.5 (L) 2021    WBC 2.7 (L) 2021    HGB 11.2 (L) 2021    HGB 10.7 (L) 2021    HCT 31.4 (L) 2021    HCT 29.6 (L) 2021     2021     2021     BMP:   Lab Results   Component Value Date     2021     2021    POTASSIUM 3.8 2021    POTASSIUM 3.8 2021    CHLORIDE 107 2021    CHLORIDE 108 2021    CO2 27 2021    CO2 29 2021    BUN 20 2021    BUN 14 2021    CR 0.67 2021    CR 0.77 2021    GLC 94 2021    GLC 97 2021     COAGS:   Lab Results   Component Value Date    PTT 45 (H) 2019    INR 1.09 2019    FIBR 293 2019     POC:   Lab Results   Component Value Date    BGM 87 2019     OTHER:   Lab Results   Component Value Date    LACT 0.4 (L) 2019    MICHAEL 9.1 2021    PHOS 4.2 2019    MAG 2.1 2019    ALBUMIN 4.2 2021    PROTTOTAL 6.9 2021    ALT 21 2021    AST 13 2021    ALKPHOS 69 2021    BILITOTAL 0.9 2021    LIPASE 21 (L) 2020    AMYLASE 24 (L) 2020    .0 (H) 2019        Preop Vitals    BP Readings from Last 3 Encounters:   21 106/48   21 115/69   21 109/63    Pulse Readings from Last 3 Encounters:   21 55   21 79   21 72      Resp Readings from Last 3 Encounters:   21 18   21 16   21 18    SpO2 Readings from Last 3 Encounters:   21 100%   21 98%   21 98%      Temp Readings from Last 1 Encounters:   21 36.7  C (98  F) (Axillary)    Ht  "Readings from Last 1 Encounters:   11/30/21 1.846 m (6' 0.68\")      Wt Readings from Last 1 Encounters:   11/30/21 75 kg (165 lb 5.5 oz)    Estimated body mass index is 22.01 kg/m  as calculated from the following:    Height as of an earlier encounter on 11/30/21: 1.846 m (6' 0.68\").    Weight as of an earlier encounter on 11/30/21: 75 kg (165 lb 5.5 oz).     LDA:  Peripheral IV 11/30/21 Right Hand (Active)   Site Assessment WDL 11/30/21 1340   Line Status Saline locked 11/30/21 1340   Phlebitis Scale 0-->no symptoms 11/30/21 1340   Infiltration Scale 0 11/30/21 1340   Number of days: 0        Past Medical History:   Diagnosis Date     Acute necrotizing pancreatitis 11/07/2019    attributed to asparaginase     Acute pancreatitis due to PEGaspariginase therapy  11/15/2019     DVT of upper extremity (deep vein thrombosis) (H) 09/26/2019    Bilateral      Edema of upper extremity 10/17/2019     Folliculitis 10/17/2019     Migraine 2006    have resolved     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      Past Surgical History:   Procedure Laterality Date     BONE MARROW BIOPSY, BONE SPECIMEN, NEEDLE/TROCAR Left 9/1/2019    Procedure: BIOPSY, BONE MARROW;  Surgeon: Heather Lopez MD;  [facility]     INSERT PICC LINE N/A 8/31/2019    Procedure: INSERTION, PICC;  Surgeon: Michell Keith MD;  [facility]     INSERT PORT VASCULAR ACCESS N/A 10/24/2019    Procedure: INSERTION, VASCULAR ACCESS PORT;  Surgeon: Silviano Martins MD;  [facility]      IR CHEST PORT PLACEMENT > 5 YRS OF AGE  10/24/2019     IR CHEST TUBE PLACEMENT NON-TUNNELLED LEFT  8/31/2019     IR PICC PLACEMENT > 5 YRS OF AGE  8/31/2019     IR PORT CHECK RIGHT  11/12/2019     SPINAL PUNCTURE,LUMBAR, INTRATHECAL CHEMO DELIVERY N/A 8/31/2019    Procedure: LUMBAR PUNCTURE, WITH INTRATHECAL CHEMOTHERAPY ADMINISTRATION;  Surgeon: Heather Lopez MD;  [facility]     SPINAL PUNCTURE,LUMBAR, INTRATHECAL CHEMO DELIVERY N/A " 9/9/2019    Procedure: Lumbar Puncture With Intrathecal Chemo;  Surgeon: Alexi Hayes MD;  [facility]     SPINAL PUNCTURE,LUMBAR, INTRATHECAL CHEMO DELIVERY N/A 10/10/2019    Procedure: Lumbar puncture with IT Chemo (CD);  Surgeon: Reynaldo Campuzano MD;  [facility]      SPINAL PUNCTURE,LUMBAR, INTRATHECAL CHEMO DELIVERY N/A 10/17/2019    Procedure: Lumbar puncture with IT Chemo (not CD);  Surgeon: Reynaldo Campuzano MD;  [facility]      SPINAL PUNCTURE,LUMBAR, INTRATHECAL CHEMO DELIVERY N/A 10/24/2019    Procedure: LUMBAR PUNCTURE, WITH INTRATHECAL CHEMOTHERAPY ADMINISTRATION;  Surgeon: Félix Van APRN CNP;  [facility]      SPINAL PUNCTURE,LUMBAR, INTRATHECAL CHEMO DELIVERY N/A 10/31/2019    Procedure: Lumbar puncture with IT Chemo (not CD);  Surgeon: Reynaldo Campuzano MD;  [facility]      SPINAL PUNCTURE,LUMBAR, INTRATHECAL CHEMO DELIVERY N/A 12/19/2019    Procedure: Lumbar puncture with IT Chem (CD);  Surgeon: Félix Van APRN CNP;  [facility]      SPINAL PUNCTURE,LUMBAR, INTRATHECAL CHEMO DELIVERY N/A 12/23/2019    Procedure: Lumbar puncture with IT Chem (CD);  Surgeon: Heather Lopez MD;  [facility]      SPINAL PUNCTURE,LUMBAR, INTRATHECAL CHEMO DELIVERY N/A 1/23/2020    Procedure: Lumbar puncture with IT Chem (CD);  Surgeon: Reynaldo Campuzano MD;  [facility]      SPINAL PUNCTURE,LUMBAR, INTRATHECAL CHEMO DELIVERY N/A 2/20/2020    Procedure: Lumbar puncture with intrathecal Chemotherapy (CD);  Surgeon: Reynaldo Campuzano MD;  [facility]      SPINAL PUNCTURE,LUMBAR, INTRATHECAL CHEMO DELIVERY N/A 3/19/2020    Procedure: Lumbar puncture with intrathecal Chemotherapy (CD);  Surgeon: Reynaldo Campuzano MD;  [facility]      SPINAL PUNCTURE,LUMBAR, INTRATHECAL CHEMO DELIVERY N/A 3/26/2020     Procedure: Lumbar puncture with intrathecal Chemotherapy (not CD);  Surgeon: Todd Mercado MD;  [facility]      SPINAL PUNCTURE,LUMBAR, INTRATHECAL CHEMO DELIVERY N/A 4/23/2020    Procedure: Lumbar puncture with intrathecal Chemotherapy (CD);  Surgeon: Marleny Brewer MD;  [facility]      SPINAL PUNCTURE,LUMBAR, INTRATHECAL CHEMO DELIVERY N/A 5/14/2020    Procedure: Lumbar puncture with intrathecal Chemotherapy (not CD);  Surgeon: Todd Mercado MD;  [facility]      SPINAL PUNCTURE,LUMBAR, INTRATHECAL CHEMO DELIVERY N/A 7/9/2020    Procedure: Lumbar puncture with intrathecal Chemotherapy (CD);  Surgeon: Todd Mercado MD;  [facility]      SPINAL PUNCTURE,LUMBAR, INTRATHECAL CHEMO DELIVERY N/A 8/6/2020    Procedure: Lumbar puncture with intrathecal Chemotherapy (not CD);  Surgeon: Todd Mercado MD;  [facility]      SPINAL PUNCTURE,LUMBAR, INTRATHECAL CHEMO DELIVERY N/A 10/6/2020    Procedure: Lumbar puncture with intrathecal Chemotherapy (not CD);  Surgeon: Félix Van APRN CNP;  [facility]      SPINAL PUNCTURE,LUMBAR, INTRATHECAL CHEMO DELIVERY N/A 11/3/2020    Procedure: Lumbar puncture with intrathecal Chemotherapy (not CD);  Surgeon: Reynaldo Campuzano MD;  [facility]      SPINAL PUNCTURE,LUMBAR, INTRATHECAL CHEMO DELIVERY N/A 12/29/2020    Procedure: Lumbar puncture with intrathecal Chemotherapy (CD);  Surgeon: Reynaldo Campuzano MD;  [facility]      SPINAL PUNCTURE,LUMBAR, INTRATHECAL CHEMO DELIVERY N/A 2/23/2021    Procedure: Lumbar puncture with intrathecal Chemotherapy (not CD);  Surgeon: Félix Van APRN CNP;  [facility]      SPINAL PUNCTURE,LUMBAR, INTRATHECAL CHEMO DELIVERY N/A 3/23/2021    Procedure: Lumbar puncture with intrathecal Chemotherapy (not CD);  Surgeon: Marie Damon NP;  [facility]      SPINAL PUNCTURE,LUMBAR, INTRATHECAL CHEMO DELIVERY N/A 2021    Procedure: Lumbar puncture with intrathecal Chemotherapy (not CD);  Surgeon: Marie Damon, NP;  [facility]      SPINAL PUNCTURE,LUMBAR, INTRATHECAL CHEMO DELIVERY N/A 6/15/2021    Procedure: Lumbar puncture with intrathecal Chemotherapy (not CD);  Surgeon: Félix Van, APRN CNP;  [facility]      SPINAL PUNCTURE,LUMBAR, INTRATHECAL CHEMO DELIVERY N/A 2021    Procedure: Lumbar puncture with intrathecal Chemotherapy (not CD);  Surgeon: Reynaldo Campuzano MD;  [facility]      THORACENTESIS N/A 2019    Procedure: Thoracentesis;  Surgeon: Michell Keith MD;  [facility]      Allergies   Allergen Reactions     Asparaginase Derivatives Other (See Comments)     Severe pancreatitis     No Known Drug Allergies         Anesthesia Evaluation    ROS/Med Hx    No history of anesthetic complications  Comments: Has tolerated MAC for his LP w/IT recently.  Felt pressure with last MAC on 6/15/21.    Cardiovascular Findings - negative ROS    Neuro Findings   Comments: Hx of migraines    Pulmonary Findings   Comments: HX of mediastinal mass at dx    HENT Findings - negative HENT ROS    Skin Findings - negative skin ROS     Findings   (-) prematurity      GI/Hepatic/Renal Findings   (-) liver disease and renal disease  Comments:   - H/o necrotizing pancreatitis d/t asparaginase; resolved    Endocrine/Metabolic Findings - negative ROS      Genetic/Syndrome Findings - negative genetics/syndromes ROS    Hematology/Oncology Findings   (+) cancer (T-cell Lymphoblastic Lymphoma)  Comments: Receiving chemo    Additional Notes  - H/o DVT in UE  - Marijuana use  - Vitamin D deficiency          PHYSICAL EXAM:   Mental Status/Neuro: A/A/O   Airway: Facies: Feasible  Mallampati: I  Mouth/Opening: Full  TM distance: > 6 cm  Neck ROM: Full   Respiratory: Auscultation: CTAB      Resp. Rate: Normal     Resp. Effort: Normal      CV: Rhythm: Regular  Rate: Age appropriate  Heart: Normal Sounds  Edema: None   Comments:      Dental: Normal Dentition                Anesthesia Plan    ASA Status:  3   NPO Status:  NPO Appropriate    Anesthesia Type: General.     - Airway: Native airway   Induction: Propofol.   Maintenance: TIVA.        Consents    Anesthesia Plan(s) and associated risks, benefits, and realistic alternatives discussed. Questions answered and patient/representative(s) expressed understanding.    - Discussed:     - Discussed with:  Patient, Parent (Mother and/or Father)      - Extended Intubation/Ventilatory Support Discussed: No.      - Patient is DNR/DNI Status: No    Use of blood products discussed: No .     Postoperative Care    Pain management: Multi-modal analgesia.        Comments:             Jyoti Martinez MD

## 2021-11-30 NOTE — PROCEDURES
[employer]    Procedure: IR Procedure Note    Date/Time: 11/30/2021 1:44 PM  Performed by: Stacey Lim PA-C  Authorized by: Stacey Lim PA-C       UNIVERSAL PROTOCOL   Site Marked: NA  Prior Images Obtained and Reviewed:  Yes  Required items: Required blood products, implants, devices and special equipment available    Patient identity confirmed:  Verbally with patient, arm band, provided demographic data and hospital-assigned identification number  Patient was reevaluated immediately before administering moderate or deep sedation or anesthesia  Confirmation Checklist:  Patient's identity using two indicators, relevant allergies, procedure was appropriate and matched the consent or emergent situation and correct equipment/implants were available  Time out: Immediately prior to the procedure a time out was called    Universal Protocol: the Joint Commission Universal Protocol was followed    Preparation: Patient was prepped and draped in usual sterile fashion       ANESTHESIA    Anesthesia: Local infiltration  Local Anesthetic:  Lidocaine 1% without epinephrine      SEDATION  Patient Sedated: Yes    Vital signs: Vital signs monitored during sedation    See dictated procedure note for full details.  Findings: Per anesthesia    Specimens: none    Complications: None    Condition: Stable      PROCEDURE  Describe Procedure: Completed removal of right chest port.  Patient Tolerance:  Patient tolerated the procedure well with no immediate complications  Length of time physician/provider present for 1:1 monitoring during sedation: 0

## 2021-12-01 ENCOUNTER — VIRTUAL VISIT (OUTPATIENT)
Dept: PEDIATRIC HEMATOLOGY/ONCOLOGY | Facility: CLINIC | Age: 21
End: 2021-12-01
Attending: NURSE PRACTITIONER
Payer: COMMERCIAL

## 2021-12-01 DIAGNOSIS — C83.50 T LYMPHOBLASTIC LYMPHOMA (H): Primary | ICD-10-CM

## 2021-12-01 PROCEDURE — 99214 OFFICE O/P EST MOD 30 MIN: CPT | Mod: GT | Performed by: NURSE PRACTITIONER

## 2021-12-01 NOTE — LETTER
12/1/2021      RE: Lazaro Lund  [address] [facility] [address]       Lazaro Lund is a 21 year old who is being evaluated via a billable video visit.      How would you like to obtain your AVS? MyChart  If the video visit is dropped, the invitation should be resent by: Text to cell phone: on file  Will anyone else be joining your video visit? No    Video Start Time: 10:02 AM    Video-Visit Details    Type of service:  Video Visit    Video End Time:10:16 AM    Originating Location (pt. 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 Lazaro presented with acute onset cough and was found to have an anterior mediastinal mass and malignant left-sided pleural effusion.  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  He was admitted to [facility] oncology service 8/30 and started on treatment per [id number].  He had a pleural effusion that required a chest tube and a pericardial effusion that was not drained. His Induction was complicated by cardiac compression secondary to his mass leading to tamponade, this improved through out his hospital stay.  He also had dysphagia that improved with treatment of his mass, swallow study was normal. His course was complicated by extensive bilateral UE DVTs, and he was successfully treated with anticoagulation. His consolidation course was complicated by the development of severe asparaginase induced necrotizing pancreatitis. He became quite ill with SIRS and associated hypotension, he required pressor support in the ICU. As a result, asparaginase was eliminated from his treatment plan. He was in CR status at end of consolidation. During maintenance, he developed  hepatotoxicity so allopurinol and dose reduced 6MP were started for correction of skewed 6-MP metabolism.  Lazaro is being treated per COG protocol [id number] and is being seen via video for post operative pain following port removal.     HPI:   Lazaro underwent port removal with the IR team yesterday. Family called the discharge pharmacy requesting pain medications due to post operative pain. Lazaro took one dose of 650 mg of tylenol yesterday evening, which did seem to give him some improvement in his discomfort. Has not tried any oral medications today. Pain was worst when his shirt would rub against the incision. No decreased range of motion, swelling of extremities or neck, discoloration, or numbness/tingling in arm. Lazaro states that he is doing okay today. Pain did not wake him from sleep overnight. Incision still appears well approximated and adhesive intact.    No other questions or concerns.    Review of systems:  The 10 point Review of Systems is negative other than noted in the HPI or here.      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 Former daily marajuana smoker, now only uses occasionally    PFMH:   Family History   Problem Relation Age of Onset     No Known Problems Mother      No Known Problems Father      Asthma Brother      Thyroid Cancer Paternal Grandmother       Melanoma Paternal Aunt        Social History:   Social History     Social History Narrative    Lazaro previously lived at home with his dad and step mom in [place] but is now staying with his grandma in [place]. Biological mother is involved in his life. Has 4 step-siblings and 1 biological sibling. 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 Has completed high school. 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  He has been enjoying [employer] and [employer] games.        Current Medications:  Current Outpatient Medications   Medication Sig Dispense Refill     allopurinol (ZYLOPRIM) 100 MG tablet Take 1 tablet (100 mg) by mouth daily 30 tablet 11     diphenhydrAMINE (BENADRYL) 25 MG capsule Take 1-2 capsules (25-50 mg) by mouth every 6 hours as needed (Breakthrough Nausea and Vomiting ) (Patient not taking: Reported on 11/2/2021) 30 capsule 1     lidocaine-prilocaine (EMLA) 2.5-2.5 % external cream Apply topically as needed for other (prior to port access) 30 g 6     magic mouthwash suspension (diphenhydrAMINE, lidocaine, aluminum-magnesium & simethicone) Swish and spit 10 mLs in mouth 4 times daily (with meals and nightly) (Patient not taking: Reported on 11/2/2021) 120 mL 2     mercaptopurine (PURINETHOL) 50 MG tablet Take 2 tablets (100mg) x 5 days per week & 1.5 tablets (75mg) x 2 days per week. Last dose 12/23/2021. 45 tablet 0     mercaptopurine (PURINETHOL) 50 MG tablet Take 75mg (1.5 tablets) x 2 days per week & 100mg (2 tablets) x 5 days per week. 54 tablet 0     methotrexate 2.5 MG tablet Take 20 tablets weekly, except for week of LP. Last dose 12/21/21. 60 tablet 0     methotrexate 2.5 MG tablet Take 20 tablets (50 mg) by mouth once a week Do not take on Days of LP. 80 tablet 2     ondansetron (ZOFRAN-ODT) 8 MG ODT tab Take 1 tablet (8 mg) by mouth every 8 hours as needed  "for nausea (Patient not taking: Reported on 11/2/2021) 20 tablet 6     pantoprazole (PROTONIX) 40 MG EC tablet Take 1 tablet (40 mg) by mouth every morning (before breakfast) During weeks of taking prednisone. 30 tablet 2     polyethylene glycol (MIRALAX/GLYCOLAX) packet Take 17 g by mouth daily (Patient not taking: Reported on 10/5/2021) 30 packet 0     predniSONE (DELTASONE) 10 MG tablet Take 40mg (4 tabs) twice dialy for 5 days. 40 tablet 0     predniSONE (DELTASONE) 10 MG tablet Take 40mg (4 tabs) twice dialy for 5 days every 4 weeks. 40 tablet 2     sennosides (SENOKOT) 8.6 MG tablet Take 2 tablets by mouth 2 times daily as needed for constipation (Patient not taking: Reported on 11/2/2021) 60 each 1     sulfamethoxazole-trimethoprim (BACTRIM DS) 800-160 MG tablet Take 1 tablet by mouth Every Mon, Tues two times daily 16 tablet 11     Vitamin D, Cholecalciferol, 25 MCG (1000 UT) TABS Take 1 tablet (1,000 Units) by mouth daily 30 tablet 3   Reviewed medications with Lazaro. Confirmed oral chemo doses, and notes no missed doses.     Physical Exam:   Temp:  [97.9  F (36.6  C)-98.5  F (36.9  C)] 98.5  F (36.9  C)  Pulse:  [54-62] 59  Resp:  [12-23] 18  BP: ()/(36-52) 83/38  SpO2:  [97 %-100 %] 97 %  Wt Readings from Last 4 Encounters:   11/30/21 75 kg (165 lb 5.5 oz)   11/02/21 79.7 kg (175 lb 11.3 oz)   10/05/21 79.4 kg (175 lb 0.7 oz)   09/07/21 80.7 kg (177 lb 14.6 oz)     Ht Readings from Last 2 Encounters:   11/30/21 1.846 m (6' 0.68\")   11/02/21 1.841 m (6' 0.48\")     Constitutional: Age-appropriate and in no distress.   HEENT: Head: Normocephalic. Eyes: Conjunctivae clear. Pupils are equal. Nares patent without drainage. Oropharynx: clear of external sores  Neck: Normal range of motion.   Cardiovascular: Good coloration, no pallor.  Pulmonary/Chest: Easy breathing, no cough heard.  Musculoskeletal: Normal range of motion; active during virtual visit.  Neurological: He is alert and oriented to person, " place, and time. Gait normal.   Skin: Skin is warm and dry. No obvious rash.  Psychiatric: Mood and affect normal.      Labs:   No results found for any visits on 12/01/21.    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  He is being treated per [facility]G protocol [id number]. He's had a CRu following Induction with a CR at the end of Consolidation. His course was complicated by extensive bilateral DVT and severe necrotizing pancreatitis requiring cessation of PEG-asparaginase from his treatment.  He is being seeing via video today for pain concerns following port removal during Cycle 8 of Maintenance therapy. Pain well controlled today. One dose of tylenol taken thus far with positive result. No other concerning symptoms.    Plan:   1) Discussed alternating acetaminophen and ibuprofen q 3 hours for the next 24-36 hours. Okay to use ibuprofen given adequate platelet counts.  2) Okay to use mildly warm or cold pack for comfort.  3) Discussed red flag symptoms and when to call.  4) If pain uncontrolled by tomorrow afternoon, call and schedule in person reevaluation.    Marie Damon CNP    Total time spent on the following services on the date of the encounter: 35 minutes  Preparing to see patient with chart review    Ordering medications, labs tests, chemotherapy   Communicating with other healthcare professionals and care coordination  Interpretation of labs  Performing a medically appropriate examination   Counseling and educating the patient/family/caregiver   Communicating results to the patient/family/caregiver   Documenting clinical information in the electronic health record         Marie Damon NP

## 2021-12-01 NOTE — PROGRESS NOTES
Lazaro Lund is a 21 year old who is being evaluated via a billable video visit.      How would you like to obtain your AVS? MyChart  If the video visit is dropped, the invitation should be resent by: Text to cell phone: on file  Will anyone else be joining your video visit? No    Video Start Time: 10:02 AM    Video-Visit Details    Type of service:  Video Visit    Video End Time:10:16 AM    Originating Location (pt. 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 Lazaro presented with acute onset cough and was found to have an anterior mediastinal mass and malignant left-sided pleural effusion.  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  He was admitted to [facility] oncology service 8/30 and started on treatment per [id number].  He had a pleural effusion that required a chest tube and a pericardial effusion that was not drained. His Induction was complicated by cardiac compression secondary to his mass leading to tamponade, this improved through out his hospital stay.  He also had dysphagia that improved with treatment of his mass, swallow study was normal. His course was complicated by extensive bilateral UE DVTs, and he was successfully treated with anticoagulation. His consolidation course was complicated by the development of severe asparaginase induced necrotizing pancreatitis. He became quite ill with SIRS and associated hypotension, he required pressor support in the ICU. As a result, asparaginase was eliminated from his treatment plan. He was in CR status at end of consolidation. During maintenance, he developed hepatotoxicity so allopurinol and dose reduced 6MP were started for correction of skewed 6-MP  metabolism.  Lazaro is being treated per [facility] protocol [id number] and is being seen via video for post operative pain following port removal.     HPI:   Lazaro underwent port removal with the IR team yesterday. Family called the discharge pharmacy requesting pain medications due to post operative pain. Lazaro took one dose of 650 mg of tylenol yesterday evening, which did seem to give him some improvement in his discomfort. Has not tried any oral medications today. Pain was worst when his shirt would rub against the incision. No decreased range of motion, swelling of extremities or neck, discoloration, or numbness/tingling in arm. Lazaro states that he is doing okay today. Pain did not wake him from sleep overnight. Incision still appears well approximated and adhesive intact.    No other questions or concerns.    Review of systems:  The 10 point Review of Systems is negative other than noted in the HPI or here.      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 Former daily marajuana smoker, now only uses occasionally    PFMH:   Family History   Problem Relation Age of Onset     No Known Problems Mother      No Known Problems Father      Asthma Brother      Thyroid Cancer Paternal Grandmother      Melanoma Paternal Aunt        Social History:   Social History     Social History Narrative     Lzaaro previously lived at home with his dad and step mom in [place] but is now staying with his grandma in [place]. Biological mother is involved in his life. Has 4 step-siblings and 1 biological sibling. 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 Has completed high school. 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  He has been enjoying fishing and [employer] games.        Current Medications:  Current Outpatient Medications   Medication Sig Dispense Refill     allopurinol (ZYLOPRIM) 100 MG tablet Take 1 tablet (100 mg) by mouth daily 30 tablet 11     diphenhydrAMINE (BENADRYL) 25 MG capsule Take 1-2 capsules (25-50 mg) by mouth every 6 hours as needed (Breakthrough Nausea and Vomiting ) (Patient not taking: Reported on 11/2/2021) 30 capsule 1     lidocaine-prilocaine (EMLA) 2.5-2.5 % external cream Apply topically as needed for other (prior to port access) 30 g 6     magic mouthwash suspension (diphenhydrAMINE, lidocaine, aluminum-magnesium & simethicone) Swish and spit 10 mLs in mouth 4 times daily (with meals and nightly) (Patient not taking: Reported on 11/2/2021) 120 mL 2     mercaptopurine (PURINETHOL) 50 MG tablet Take 2 tablets (100mg) x 5 days per week & 1.5 tablets (75mg) x 2 days per week. Last dose 12/23/2021. 45 tablet 0     mercaptopurine (PURINETHOL) 50 MG tablet Take 75mg (1.5 tablets) x 2 days per week & 100mg (2 tablets) x 5 days per week. 54 tablet 0     methotrexate 2.5 MG tablet Take 20 tablets weekly, except for week of LP. Last dose 12/21/21. 60 tablet 0     methotrexate 2.5 MG tablet Take 20 tablets (50 mg) by mouth once a week Do not take on Days of LP. 80 tablet 2     ondansetron (ZOFRAN-ODT) 8 MG ODT tab Take 1 tablet (8 mg) by mouth every 8 hours as needed for nausea (Patient not taking: Reported on 11/2/2021) 20 tablet 6     pantoprazole (PROTONIX)  "40 MG EC tablet Take 1 tablet (40 mg) by mouth every morning (before breakfast) During weeks of taking prednisone. 30 tablet 2     polyethylene glycol (MIRALAX/GLYCOLAX) packet Take 17 g by mouth daily (Patient not taking: Reported on 10/5/2021) 30 packet 0     predniSONE (DELTASONE) 10 MG tablet Take 40mg (4 tabs) twice dialy for 5 days. 40 tablet 0     predniSONE (DELTASONE) 10 MG tablet Take 40mg (4 tabs) twice dialy for 5 days every 4 weeks. 40 tablet 2     sennosides (SENOKOT) 8.6 MG tablet Take 2 tablets by mouth 2 times daily as needed for constipation (Patient not taking: Reported on 11/2/2021) 60 each 1     sulfamethoxazole-trimethoprim (BACTRIM DS) 800-160 MG tablet Take 1 tablet by mouth Every Mon, Tues two times daily 16 tablet 11     Vitamin D, Cholecalciferol, 25 MCG (1000 UT) TABS Take 1 tablet (1,000 Units) by mouth daily 30 tablet 3   Reviewed medications with Lazaro. Confirmed oral chemo doses, and notes no missed doses.     Physical Exam:   Temp:  [97.9  F (36.6  C)-98.5  F (36.9  C)] 98.5  F (36.9  C)  Pulse:  [54-62] 59  Resp:  [12-23] 18  BP: ()/(36-52) 83/38  SpO2:  [97 %-100 %] 97 %  Wt Readings from Last 4 Encounters:   11/30/21 75 kg (165 lb 5.5 oz)   11/02/21 79.7 kg (175 lb 11.3 oz)   10/05/21 79.4 kg (175 lb 0.7 oz)   09/07/21 80.7 kg (177 lb 14.6 oz)     Ht Readings from Last 2 Encounters:   11/30/21 1.846 m (6' 0.68\")   11/02/21 1.841 m (6' 0.48\")     Constitutional: Age-appropriate and in no distress.   HEENT: Head: Normocephalic. Eyes: Conjunctivae clear. Pupils are equal. Nares patent without drainage. Oropharynx: clear of external sores  Neck: Normal range of motion.   Cardiovascular: Good coloration, no pallor.  Pulmonary/Chest: Easy breathing, no cough heard.  Musculoskeletal: Normal range of motion; active during virtual visit.  Neurological: He is alert and oriented to person, place, and time. Gait normal.   Skin: Skin is warm and dry. No obvious rash.  Psychiatric: Mood " and affect normal.      Labs:   No results found for any visits on 12/01/21.    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  He is being treated per [facility] protocol [id number]. He's had a CRu following Induction with a CR at the end of Consolidation. His course was complicated by extensive bilateral DVT and severe necrotizing pancreatitis requiring cessation of PEG-asparaginase from his treatment.  He is being seeing via video today for pain concerns following port removal during Cycle 8 of Maintenance therapy. Pain well controlled today. One dose of tylenol taken thus far with positive result. No other concerning symptoms.    Plan:   1) Discussed alternating acetaminophen and ibuprofen q 3 hours for the next 24-36 hours. Okay to use ibuprofen given adequate platelet counts.  2) Okay to use mildly warm or cold pack for comfort.  3) Discussed red flag symptoms and when to call.  4) If pain uncontrolled by tomorrow afternoon, call and schedule in person reevaluation.    Marie Damon CNP    Total time spent on the following services on the date of the encounter: 35 minutes  Preparing to see patient with chart review    Ordering medications, labs tests, chemotherapy   Communicating with other healthcare professionals and care coordination  Interpretation of labs  Performing a medically appropriate examination   Counseling and educating the patient/family/caregiver   Communicating results to the patient/family/caregiver   Documenting clinical information in the electronic health record

## 2021-12-01 NOTE — PROVIDER NOTIFICATION
11/30/21 1100   Child Life   Location Hem/Onc Clinic   Intervention Therapeutic Intervention  (CCLS facilitated patient ringing the [facility] Milestone Zaman to celebrate patient's end of treatment. Patient's family members and [facility] staff present. CCLS provided Milestone bell sign and grounding stone per zaman protocol.)   Outcomes/Follow Up Continue to Follow/Support;Provided Materials

## 2021-12-02 LAB
6-TGN ENTSUB RBC: 1017 PMOL/8X10(8)RBC (ref 235–450)
6MMP ENTSUB RBC: 654 PMOL/8X10(8)RBC
HCV RNA SERPL NAA+PROBE-ACNC: NOT DETECTED IU/ML

## 2022-01-07 ENCOUNTER — HOSPITAL ENCOUNTER (OUTPATIENT)
Dept: CARDIOLOGY | Facility: CLINIC | Age: 22
End: 2022-01-07
Attending: NURSE PRACTITIONER
Payer: COMMERCIAL

## 2022-01-07 ENCOUNTER — OFFICE VISIT (OUTPATIENT)
Dept: PEDIATRIC HEMATOLOGY/ONCOLOGY | Facility: CLINIC | Age: 22
End: 2022-01-07
Attending: PEDIATRICS
Payer: COMMERCIAL

## 2022-01-07 VITALS
HEIGHT: 72 IN | DIASTOLIC BLOOD PRESSURE: 65 MMHG | OXYGEN SATURATION: 98 % | WEIGHT: 164.46 LBS | RESPIRATION RATE: 16 BRPM | SYSTOLIC BLOOD PRESSURE: 119 MMHG | TEMPERATURE: 98.6 F | BODY MASS INDEX: 22.28 KG/M2 | HEART RATE: 94 BPM

## 2022-01-07 DIAGNOSIS — K59.01 SLOW TRANSIT CONSTIPATION: ICD-10-CM

## 2022-01-07 DIAGNOSIS — C83.50 T LYMPHOBLASTIC LYMPHOMA (H): ICD-10-CM

## 2022-01-07 DIAGNOSIS — C83.50 T LYMPHOBLASTIC LYMPHOMA (H): Primary | ICD-10-CM

## 2022-01-07 LAB
BASOPHILS # BLD AUTO: 0 10E3/UL (ref 0–0.2)
BASOPHILS NFR BLD AUTO: 0 %
EOSINOPHIL # BLD AUTO: 0 10E3/UL (ref 0–0.7)
EOSINOPHIL NFR BLD AUTO: 1 %
ERYTHROCYTE [DISTWIDTH] IN BLOOD BY AUTOMATED COUNT: 14.3 % (ref 10–15)
HCT VFR BLD AUTO: 26.3 % (ref 40–53)
HGB BLD-MCNC: 8.9 G/DL (ref 13.3–17.7)
IMM GRANULOCYTES # BLD: 0 10E3/UL
IMM GRANULOCYTES NFR BLD: 0 %
LYMPHOCYTES # BLD AUTO: 2.6 10E3/UL (ref 0.8–5.3)
LYMPHOCYTES NFR BLD AUTO: 57 %
MCH RBC QN AUTO: 35.9 PG (ref 26.5–33)
MCHC RBC AUTO-ENTMCNC: 33.8 G/DL (ref 31.5–36.5)
MCV RBC AUTO: 106 FL (ref 78–100)
MONOCYTES # BLD AUTO: 0.5 10E3/UL (ref 0–1.3)
MONOCYTES NFR BLD AUTO: 10 %
NEUTROPHILS # BLD AUTO: 1.4 10E3/UL (ref 1.6–8.3)
NEUTROPHILS NFR BLD AUTO: 32 %
NRBC # BLD AUTO: 0 10E3/UL
NRBC BLD AUTO-RTO: 0 /100
PLAT MORPH BLD: NORMAL
PLATELET # BLD AUTO: 267 10E3/UL (ref 150–450)
RBC # BLD AUTO: 2.48 10E6/UL (ref 4.4–5.9)
RBC MORPH BLD: NORMAL
WBC # BLD AUTO: 4.6 10E3/UL (ref 4–11)

## 2022-01-07 PROCEDURE — 36415 COLL VENOUS BLD VENIPUNCTURE: CPT | Performed by: NURSE PRACTITIONER

## 2022-01-07 PROCEDURE — G0008 ADMIN INFLUENZA VIRUS VAC: HCPCS | Performed by: NURSE PRACTITIONER

## 2022-01-07 PROCEDURE — 90686 IIV4 VACC NO PRSV 0.5 ML IM: CPT | Performed by: NURSE PRACTITIONER

## 2022-01-07 PROCEDURE — 99215 OFFICE O/P EST HI 40 MIN: CPT | Performed by: NURSE PRACTITIONER

## 2022-01-07 PROCEDURE — 250N000011 HC RX IP 250 OP 636: Performed by: NURSE PRACTITIONER

## 2022-01-07 PROCEDURE — G0463 HOSPITAL OUTPT CLINIC VISIT: HCPCS

## 2022-01-07 PROCEDURE — 85025 COMPLETE CBC W/AUTO DIFF WBC: CPT | Performed by: NURSE PRACTITIONER

## 2022-01-07 PROCEDURE — 93306 TTE W/DOPPLER COMPLETE: CPT | Mod: 26 | Performed by: PEDIATRICS

## 2022-01-07 PROCEDURE — 93306 TTE W/DOPPLER COMPLETE: CPT

## 2022-01-07 RX ORDER — POLYETHYLENE GLYCOL 3350 17 G/17G
1 POWDER, FOR SOLUTION ORAL DAILY PRN
Qty: 500 G | Refills: 1 | Status: SHIPPED | OUTPATIENT
Start: 2022-01-07 | End: 2022-06-28

## 2022-01-07 RX ADMIN — INFLUENZA A VIRUS A/VICTORIA/2570/2019 IVR-215 (H1N1) ANTIGEN (FORMALDEHYDE INACTIVATED), INFLUENZA A VIRUS A/TASMANIA/503/2020 IVR-221 (H3N2) ANTIGEN (FORMALDEHYDE INACTIVATED), INFLUENZA B VIRUS B/PHUKET/3073/2013 ANTIGEN (FORMALDEHYDE INACTIVATED), AND INFLUENZA B VIRUS B/WASHINGTON/02/2019 ANTIGEN (FORMALDEHYDE INACTIVATED) 0.5 ML: 15; 15; 15; 15 INJECTION, SUSPENSION INTRAMUSCULAR at 15:27

## 2022-01-07 ASSESSMENT — MIFFLIN-ST. JEOR: SCORE: 1785.38

## 2022-01-07 ASSESSMENT — PAIN SCALES - GENERAL: PAINLEVEL: NO PAIN (0)

## 2022-01-07 NOTE — PROGRESS NOTES
Pediatric Hematology/Oncology Clinic Note     Lazaro Lund is a 21 year old young man with a history of T-cell lymphoblastic lymphoma. Lazaro presented with acute onset cough and was found to have an anterior mediastinal mass and malignant left-sided pleural effusion.  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  He was admitted to [facility] oncology service 8/30 and started on treatment per [id number].  He had a pleural effusion that required a chest tube and a pericardial effusion that was not drained. His Induction was complicated by cardiac compression secondary to his mass leading to tamponade, this improved through out his hospital stay.  He also had dysphagia that improved with treatment of his mass, swallow study was normal. His course was complicated by extensive bilateral UE DVTs, and he was successfully treated with anticoagulation. His consolidation course was complicated by the development of severe asparaginase induced necrotizing pancreatitis. He became quite ill with SIRS and associated hypotension, he required pressor support in the ICU. As a result, asparaginase was eliminated from his treatment plan. He was in CR status at end of consolidation. During maintenance, he developed hepatotoxicity so allopurinol and dose reduced 6MP were started for correction of skewed 6-MP metabolism.  Lazaro was treated per [facility] protocol [id number] and completed therapy on 12/23/21, he comes to clinic today for his first off therapy visit.    HPI:   Since Lazaro's last visit he was diagnosed with COVID >2 weeks ago. He felt quite poorly for about 4 days with fatigue, joint pain and fever.  He has since mostly recovered, has residual fatigue and change in smell.  Lazaro is back to work.  He denies any current pain.  Has noted some increase in acne lesions since completing therapy, no other skin concerns.  He's also noted significant constipation and is  interested in restarting laxatives.    Lazaro is eating well, he had a 5+ lb weight loss with covid and he is trying to gain some of that weight back.  No GI upset, no longer on protonix.  No orthopnea or SOB.  He has some questions about fertility.  He is looking to move out with some friends, potentially in [place].    Review of systems:  The 10 point Review of Systems is negative other than noted in the HPI or here.      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 Former daily marajuana smoker, now only uses occasionally    PFMH:   Family History   Problem Relation Age of Onset     No Known Problems Mother      No Known Problems Father      Asthma Brother      Thyroid Cancer Paternal Grandmother      Melanoma Paternal Aunt        Social History:   Social History     Social History Narrative    Lazaro previously lived at home with his dad and step mom in [place] but is now staying with his grandma in [place]. Biological mother is involved in his life. Has 4 step-siblings and 1 biological sibling. 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 Has completed high school. 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  He has been enjoying fishing and  [employer].        Current Medications:  Current Outpatient Medications   Medication Sig Dispense Refill     allopurinol (ZYLOPRIM) 100 MG tablet Take 1 tablet (100 mg) by mouth daily 30 tablet 11     diphenhydrAMINE (BENADRYL) 25 MG capsule Take 1-2 capsules (25-50 mg) by mouth every 6 hours as needed (Breakthrough Nausea and Vomiting ) (Patient not taking: Reported on 11/2/2021) 30 capsule 1     lidocaine-prilocaine (EMLA) 2.5-2.5 % external cream Apply topically as needed for other (prior to port access) 30 g 6     magic mouthwash suspension (diphenhydrAMINE, lidocaine, aluminum-magnesium & simethicone) Swish and spit 10 mLs in mouth 4 times daily (with meals and nightly) (Patient not taking: Reported on 11/2/2021) 120 mL 2     mercaptopurine (PURINETHOL) 50 MG tablet Take 2 tablets (100mg) x 5 days per week & 1.5 tablets (75mg) x 2 days per week. Last dose 12/23/2021. 45 tablet 0     mercaptopurine (PURINETHOL) 50 MG tablet Take 75mg (1.5 tablets) x 2 days per week & 100mg (2 tablets) x 5 days per week. 54 tablet 0     methotrexate 2.5 MG tablet Take 20 tablets weekly, except for week of LP. Last dose 12/21/21. 60 tablet 0     methotrexate 2.5 MG tablet Take 20 tablets (50 mg) by mouth once a week Do not take on Days of LP. 80 tablet 2     ondansetron (ZOFRAN-ODT) 8 MG ODT tab Take 1 tablet (8 mg) by mouth every 8 hours as needed for nausea (Patient not taking: Reported on 11/2/2021) 20 tablet 6     pantoprazole (PROTONIX) 40 MG EC tablet Take 1 tablet (40 mg) by mouth every morning (before breakfast) During weeks of taking prednisone. 30 tablet 2     polyethylene glycol (MIRALAX/GLYCOLAX) packet Take 17 g by mouth daily (Patient not taking: Reported on 10/5/2021) 30 packet 0     predniSONE (DELTASONE) 10 MG tablet Take 40mg (4 tabs) twice dialy for 5 days. 40 tablet 0     predniSONE (DELTASONE) 10 MG tablet Take 40mg (4 tabs) twice dialy for 5 days every 4 weeks. 40 tablet 2     sennosides (SENOKOT) 8.6 MG  "tablet Take 2 tablets by mouth 2 times daily as needed for constipation (Patient not taking: Reported on 11/2/2021) 60 each 1     sulfamethoxazole-trimethoprim (BACTRIM DS) 800-160 MG tablet Take 1 tablet by mouth Every Mon, Tues two times daily 16 tablet 11     Vitamin D, Cholecalciferol, 25 MCG (1000 UT) TABS Take 1 tablet (1,000 Units) by mouth daily 30 tablet 3   Reviewed medications with Lazaro. Confirmed last doses of chemotherapy on 12/23/21.  Updated med list.     Physical Exam:   Temp:  [98.6  F (37  C)] 98.6  F (37  C)  Pulse:  [94] 94  Resp:  [16] 16  BP: (119)/(65) 119/65  SpO2:  [98 %] 98 %  Wt Readings from Last 4 Encounters:   01/07/22 74.6 kg (164 lb 7.4 oz)   11/30/21 75 kg (165 lb 5.5 oz)   11/02/21 79.7 kg (175 lb 11.3 oz)   10/05/21 79.4 kg (175 lb 0.7 oz)     Ht Readings from Last 2 Encounters:   01/07/22 1.839 m (6' 0.4\")   11/30/21 1.846 m (6' 0.68\")     General: Lazaro is alert, interactive and appropriate, well-appearing, in no distress  HEENT: Skull is atrauamatic and normocephalic.  Full head of hair. PERRLA, sclera are non icteric and not injected, EOM are intact. Nares are patent without drainage. Oropharynx clear, no plaques noted. No mucositis. MMM.  Neck:  Supple without lymphadenopathy.   Lymph: There is no cervical, supraclavicular, axillary, lymphadenopathy palpated.  Cardiovascular:  HR is regular, S1, S2 no murmur.  Capillary refill is < 2 seconds.   Respiratory: No cough noted. Respirations are easy. Lungs are clear to auscultation throughout.  No crackles or wheezes.  Gastrointestinal: BS present in all quadrants.  Abdomen is soft and flat.  No pain with palpation. No hepatosplenomegaly or masses are palpated.  Skin: No concerning lesions. No rashes, bruises.  Old port site is c/d/i.  Neurological:  CN 2-12 grossly intact. Gait is normal.  No issues with balance. Sensation intact in hands and feet.     Musculoskeletal: Good strength and ROM in all extremities.  Strong " dorsiflexion at ankles and great toes (5/5) bilaterally without any pain at the Achilles.     Labs:   Results for orders placed or performed in visit on 01/07/22   CBC with platelets and differential     Status: Abnormal   Result Value Ref Range    WBC Count 4.6 4.0 - 11.0 10e3/uL    RBC Count 2.48 (L) 4.40 - 5.90 10e6/uL    Hemoglobin 8.9 (L) 13.3 - 17.7 g/dL    Hematocrit 26.3 (L) 40.0 - 53.0 %     (H) 78 - 100 fL    MCH 35.9 (H) 26.5 - 33.0 pg    MCHC 33.8 31.5 - 36.5 g/dL    RDW 14.3 10.0 - 15.0 %    Platelet Count 267 150 - 450 10e3/uL    % Neutrophils 32 %    % Lymphocytes 57 %    % Monocytes 10 %    % Eosinophils 1 %    % Basophils 0 %    % Immature Granulocytes 0 %    NRBCs per 100 WBC 0 <1 /100    Absolute Neutrophils 1.4 (L) 1.6 - 8.3 10e3/uL    Absolute Lymphocytes 2.6 0.8 - 5.3 10e3/uL    Absolute Monocytes 0.5 0.0 - 1.3 10e3/uL    Absolute Eosinophils 0.0 0.0 - 0.7 10e3/uL    Absolute Basophils 0.0 0.0 - 0.2 10e3/uL    Absolute Immature Granulocytes 0.0 <=0.4 10e3/uL    Absolute NRBCs 0.0 10e3/uL   RBC and Platelet Morphology     Status: None   Result Value Ref Range    Platelet Assessment  Automated Count Confirmed. Platelet morphology is normal.     Automated Count Confirmed. Platelet morphology is normal.    RBC Morphology Confirmed RBC Indices    CBC with Platelets & Differential     Status: Abnormal    Narrative    The following orders were created for panel order CBC with Platelets & Differential.  Procedure                               Abnormality         Status                     ---------                               -----------         ------                     CBC with platelets and d...[[id number]]  Abnormal            Final result               RBC and Platelet Morphology[[id number]]                      Final result                 Please view results for these tests on the individual orders.   Results for orders placed or performed during the hospital encounter of 01/07/22   Echo  Pediatric (TTE) Complete     Status: None    Narrative    [id number]  [id number]  [id number]  [id number]^SANDIP^FAUSTO^PINEDA                                                               Study ID: [id number]                                                 [facility]'[address] [address]                                                Phone: [phone]                                Pediatric Echocardiogram  ______________________________________________________________________________  Name: SHARONKADEEMPLACIDO  Study Date: 2022 01:53 PM                    Patient Location: [facility]  MRN: [id number]                                    Age: 21 yrs  : 2000                                    BP: 122/63 mmHg  Gender: Male  Patient Class: Outpatient                          Height: 73 in  Ordering Provider: FAUSTO OROPEZA             Weight: 165 lb  Referring Provider: FAUSTO OROPEZA            BSA: 2.0 m2  Performed By: Emeka Frey RCCS  Report approved by: Andrew Panchal MD  Reason For Study: T lymphoblastic lymphoma (H)  ______________________________________________________________________________  ##### CONCLUSIONS #####  Normal echocardiogram. There is normal appearance and motion of the tricuspid,  mitral, pulmonary and aortic valves. The left and right ventricles have normal  chamber size, wall thickness, and systolic function. The calculated biplane  left ventricular ejection fraction is 62%.  ______________________________________________________________________________  Technical information:  A complete two dimensional, MMODE, spectral and color Doppler transthoracic  echocardiogram is performed. The study quality is adequate. Images are  obtained from parasternal, apical, subcostal and suprasternal  notch views.  Prior echocardiogram available for comparison. ECG tracing shows regular  rhythm.     Segmental Anatomy:  There is normal atrial arrangement, with concordant atrioventricular and  ventriculoarterial connections.     Systemic and pulmonary veins:  The inferior vena cava drains normally to the right atrium. Color flow  demonstrates flow from two pulmonary veins entering the left atrium.     Atria and atrial septum:  Normal right atrial size. The left atrium is normal in size. There is no  obvious atrial level shunting.     Atrioventricular valves:  The tricuspid valve is normal in appearance and motion. Trivial tricuspid  valve insufficiency. Estimated right ventricular systolic pressure is 14.4  mmHg plus right atrial pressure. The mitral valve is normal in appearance and  motion. There is no mitral valve insufficiency.     Ventricles and Ventricular Septum:  The left and right ventricles have normal chamber size, wall thickness, and  systolic function. The calculated biplane left ventricular ejection fraction  is 62 %.     Outflow tracts:  Normal great artery relationship. There is unobstructed flow through the right  ventricular outflow tract. The pulmonary valve motion is normal. There is  normal flow across the pulmonary valve. Trivial pulmonary valve insufficiency.  There is unobstructed flow through the left ventricular outflow tract.  Tricuspid aortic valve with normal appearance and motion. There is normal flow  across the aortic valve.     Great arteries:  The main pulmonary artery has normal appearance. There is unobstructed flow in  the main pulmonary artery. The pulmonary artery bifurcation is normal. There  is unobstructed flow in both branch pulmonary arteries. Normal ascending  aorta. The aortic arch appears normal. There is unobstructed antegrade flow in  the ascending, transverse arch, descending thoracic and abdominal aorta.     Effusions, catheters, cannulas and leads:  No pericardial  effusion.     MMode/2D Measurements & Calculations  LA dimension: 3.4 cm                       Ao root diam: 3.1 cm     LA/Ao: 1.1                                 2 Chamber EF: 55.0 %  4 Chamber EF: 67.0 %                       EF Biplane: 62.0 %  LVMI(BSA): 106.4 grams/m2                  LVMI(Height): 39.6  RWT(MM): 0.31     Doppler Measurements & Calculations  MV E max marialuisa: 102.0 cm/sec               Ao V2 max: 111.0 cm/sec  MV A max marialuisa: 65.2 cm/sec                Ao max P.9 mmHg  MV E/A: 1.6  LV V1 max: 102.0 cm/sec                  PA V2 max: 91.3 cm/sec  LV V1 max P.2 mmHg                   PA max PG: 3.3 mmHg  RV V1 max: 84.4 cm/sec                   TR max marialuisa: 190.0 cm/sec  RV V1 max P.8 mmHg                   TR max P.4 mmHg     LPA max marialuisa: 108.0 cm/sec  LPA max P.7 mmHg  RPA max marialuisa: 57.8 cm/sec  RPA max P.3 mmHg     asc Ao max marialuisa: 122.0 cm/sec          desc Ao max marialuisa: 131.0 cm/sec  asc Ao max P.0 mmHg               desc Ao max P.9 mmHg  MPA max marialuisa: 93.8 cm/sec  MPA max PG: 3.5 mmHg     [place] Z-Scores (Measurements & Calculations)  Measurement NameValue      Z-ScorePredictedNormal Range  IVSd(MM)        1.3 cm     2.0    1.0      0.70 - 1.32  LVIDd(MM)       5.2 cm     0.00   5.2      4.4 - 5.9  LVIDs(MM)       3.4 cm     0.02   3.4      2.6 - 4.1  LVPWd(MM)       0.79 cm    -1.2   0.95     0.69 - 1.20  LV mass(C)d(MM) 208.5 grams0.53   187.4    126.6 - 277.4  FS(MM)          35.1 %     0.08   34.8     28.3 - 42.7     Report approved by: Jocy Fraga 2022 02:25 PM             Assessment:  Lazaro Lund is a 21 year old young man with a history of T cell lymphoblastic lymphoma (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  He was [place] [facility] [place] [id number]. He had a CRu following Induction with a CR at the end of Consolidation. His course was complicated by extensive bilateral DVT and severe necrotizing pancreatitis requiring cessation of PEG-asparaginase from his  treatment.  He completed therapy on 12/23/21 and comes to clinic today for his first off therapy visit.  He has recovered from recent COVID infection.  He has anemia but no signs of hemolysis.  He has some questions about fertility.  He is constipated.    Plan:   1) Labs reviewed with Lazaro. Discussed anemia, could be secondary to recent covid.  Discussed s/sx to monitor for as I would not expect progression of his fatigue at this point.  2) Start miralax for constipation, rx sent.  3) End of therapy echo done today, looks good.  Total anthracycline dose= 175mg/m2, he will need echos every 5 years.  4) Continue to recommend that he take Vit D 1000 IU daily.   5) Bactrim for PCP prophylaxis, will need to continue until he is 3 months off therapy (end of March).   6) Discussed fertility, most people will retain fertility following this therapy.  Lazaro did sperm bank but elected not to keep the specimen. He should assume that he is fertile, offered repeat semen analysis in the future if he is interested.  Would ideally wait until he is 1 year off therapy.  7) Influenza vaccine today, COVID vaccine at next visit.  8) Lazaro received 6u of PRBCs during therapy.  Will check ferritin at next visit.  9) Plan for vaccine titers when Lazaro is 6 months off therapy.  10) RTC in 2 weeks for closer follow up of new anemia.  Reviewed s/sx of progressive anemia and asked him to seek care in the interim if needed.    Félix Van, TATY    Total time spent on the following services on the date of the encounter: 50 minutes  Preparing to see patient with chart review    Ordering medications, labs tests, chemotherapy   Communicating with other healthcare professionals and care coordination  Interpretation of labs  Performing a medically appropriate examination   Counseling and educating the patient/family/caregiver   Communicating results to the patient/family/caregiver   Documenting clinical information in the electronic health  record

## 2022-01-07 NOTE — NURSING NOTE
"Chief Complaint   Patient presents with     RECHECK     Patient here today for lymphoma     /65 (BP Location: Right arm, Patient Position: Sitting, Cuff Size: Adult Regular)   Pulse 94   Temp 98.6  F (37  C) (Oral)   Resp 16   Ht 1.839 m (6' 0.4\")   Wt 74.6 kg (164 lb 7.4 oz)   SpO2 98%   BMI 22.06 kg/m      No Pain (0)  Data Unavailable    I have reviewed the patients medications and allergies    Height/weight double check needed? No    Peds Outpatient BP  1) Rested for 5 minutes, BP taken on bare arm, patient sitting (or supine for infants) w/ legs uncrossed?   Yes  2) Right arm used?  Right arm   Yes  3) Arm circumference of largest part of upper arm (in cm): 27cm  4) BP cuff sized used: Adult (25-32cm)   If used different size cuff then what was recommended why? N/A  5) First BP reading:machine   BP Readings from Last 1 Encounters:   01/07/22 119/65      Is reading >90%?No   (90% for <1 years is 90/50)  (90% for >18 years is 140/90)  *If a machine BP is at or above 90% take manual BP  6) Manual BP reading: N/A  7) Other comments: None          Xenia Joe CMA  January 7, 2022  "

## 2022-01-24 ENCOUNTER — TELEPHONE (OUTPATIENT)
Dept: PEDIATRIC HEMATOLOGY/ONCOLOGY | Facility: CLINIC | Age: 22
End: 2022-01-24
Payer: COMMERCIAL

## 2022-01-24 NOTE — TELEPHONE ENCOUNTER
LVM with patient, reviewed policy regarding marks and visitors. Left call back number [phone] to go over Covid screening questions for future appt.  Xenia Joe CMA  January 24, 2022

## 2022-01-25 ENCOUNTER — OFFICE VISIT (OUTPATIENT)
Dept: PEDIATRIC HEMATOLOGY/ONCOLOGY | Facility: CLINIC | Age: 22
End: 2022-01-25
Attending: NURSE PRACTITIONER
Payer: COMMERCIAL

## 2022-01-25 VITALS
HEIGHT: 72 IN | HEART RATE: 86 BPM | SYSTOLIC BLOOD PRESSURE: 126 MMHG | TEMPERATURE: 98.3 F | OXYGEN SATURATION: 97 % | DIASTOLIC BLOOD PRESSURE: 68 MMHG | WEIGHT: 170.42 LBS | RESPIRATION RATE: 18 BRPM | BODY MASS INDEX: 23.08 KG/M2

## 2022-01-25 DIAGNOSIS — C83.50 T LYMPHOBLASTIC LYMPHOMA (H): ICD-10-CM

## 2022-01-25 LAB
BASOPHILS # BLD AUTO: 0 10E3/UL (ref 0–0.2)
BASOPHILS NFR BLD AUTO: 1 %
EOSINOPHIL # BLD AUTO: 0 10E3/UL (ref 0–0.7)
EOSINOPHIL NFR BLD AUTO: 1 %
ERYTHROCYTE [DISTWIDTH] IN BLOOD BY AUTOMATED COUNT: 12.4 % (ref 10–15)
FERRITIN SERPL-MCNC: 215 NG/ML (ref 26–388)
HCT VFR BLD AUTO: 38.5 % (ref 40–53)
HGB BLD-MCNC: 12.8 G/DL (ref 13.3–17.7)
IMM GRANULOCYTES # BLD: 0 10E3/UL
IMM GRANULOCYTES NFR BLD: 0 %
LYMPHOCYTES # BLD AUTO: 0.8 10E3/UL (ref 0.8–5.3)
LYMPHOCYTES NFR BLD AUTO: 18 %
MCH RBC QN AUTO: 35.4 PG (ref 26.5–33)
MCHC RBC AUTO-ENTMCNC: 33.2 G/DL (ref 31.5–36.5)
MCV RBC AUTO: 106 FL (ref 78–100)
MONOCYTES # BLD AUTO: 0.5 10E3/UL (ref 0–1.3)
MONOCYTES NFR BLD AUTO: 11 %
NEUTROPHILS # BLD AUTO: 3.2 10E3/UL (ref 1.6–8.3)
NEUTROPHILS NFR BLD AUTO: 69 %
NRBC # BLD AUTO: 0 10E3/UL
NRBC BLD AUTO-RTO: 0 /100
PLATELET # BLD AUTO: 224 10E3/UL (ref 150–450)
RBC # BLD AUTO: 3.62 10E6/UL (ref 4.4–5.9)
RETICS # AUTO: 0.14 10E6/UL (ref 0.03–0.1)
RETICS/RBC NFR AUTO: 3.8 % (ref 0.5–2)
WBC # BLD AUTO: 4.6 10E3/UL (ref 4–11)

## 2022-01-25 PROCEDURE — 85045 AUTOMATED RETICULOCYTE COUNT: CPT | Performed by: NURSE PRACTITIONER

## 2022-01-25 PROCEDURE — 85025 COMPLETE CBC W/AUTO DIFF WBC: CPT | Performed by: NURSE PRACTITIONER

## 2022-01-25 PROCEDURE — 82728 ASSAY OF FERRITIN: CPT | Performed by: NURSE PRACTITIONER

## 2022-01-25 PROCEDURE — 36415 COLL VENOUS BLD VENIPUNCTURE: CPT | Performed by: NURSE PRACTITIONER

## 2022-01-25 PROCEDURE — 99215 OFFICE O/P EST HI 40 MIN: CPT | Performed by: NURSE PRACTITIONER

## 2022-01-25 PROCEDURE — G0463 HOSPITAL OUTPT CLINIC VISIT: HCPCS

## 2022-01-25 ASSESSMENT — MIFFLIN-ST. JEOR: SCORE: 1808

## 2022-01-25 ASSESSMENT — PAIN SCALES - GENERAL: PAINLEVEL: NO PAIN (0)

## 2022-01-25 NOTE — PROGRESS NOTES
Pediatric Hematology/Oncology Clinic Note     Lazaro Lund is a 21 year old young man with a history of T-cell lymphoblastic lymphoma. Lazaro presented with acute onset cough and was found to have an anterior mediastinal mass and malignant left-sided pleural effusion.  A CT guided biopsy was obtained at [facility] and pathology was consistent with T-cell leukemia vs lymphoma.  He was admitted to [facility] oncology service 8/30 and started on treatment per [id number].  He had a pleural effusion that required a chest tube and a pericardial effusion that was not drained. His Induction was complicated by cardiac compression secondary to his mass leading to tamponade, this improved through out his hospital stay.  He also had dysphagia that improved with treatment of his mass, swallow study was normal. His course was complicated by extensive bilateral UE DVTs, and he was successfully treated with anticoagulation. His consolidation course was complicated by the development of severe asparaginase induced necrotizing pancreatitis. He became quite ill with SIRS and associated hypotension, he required pressor support in the ICU. As a result, asparaginase was eliminated from his treatment plan. He was in CR status at end of consolidation. During maintenance, he developed hepatotoxicity so allopurinol and dose reduced 6MP were started for correction of skewed 6-MP metabolism.  Lazaro was treated per [facility] protocol [id number] and completed therapy on 12/23/21, he comes to clinic today for his first off therapy visit.    HPI:   Since Lazaro's last visit he has been doing well.  His fatigue has fully resolved following COVID.  He is working out at the gym most days of the week and feels really great.  His work has been slow, he's been working only about 20 hours per week so is looking for a new job.  He is eating well, no GI upset.  He has regained the weight he lost during COVID.  No fevers.  He denies pain or skin  concerns.    Lazaro's mood is great, he is looking forward to a trip to [place] with some friends in March.  He is sleeping well at night.  No orthopnea or SOB.    Review of systems:  The 10 point Review of Systems is negative other than noted in the HPI or here.      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 Former daily marajuana smoker, now only uses occasionally    PFMH:   Family History   Problem Relation Age of Onset     No Known Problems Mother      No Known Problems Father      Asthma Brother      Thyroid Cancer Paternal Grandmother      Melanoma Paternal Aunt        Social History:   Social History     Social History Narrative    Lazaro previously lived at home with his dad and step mom in [place] but is now staying with his grandma in [place]. Biological mother is involved in his life. Has 4 step-siblings and 1 biological sibling. 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 Has completed high school. 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  He has been enjoying fishing and video games.        Current Medications:  Current Outpatient Medications   Medication Sig Dispense Refill     polyethylene glycol (MIRALAX) 17 GM/Dose powder Take  "17 g (1 capful) by mouth daily as needed for constipation 500 g 1     sulfamethoxazole-trimethoprim (BACTRIM DS) 800-160 MG tablet Take 1 tablet by mouth Every Mon, Tues two times daily 16 tablet 11     Vitamin D, Cholecalciferol, 25 MCG (1000 UT) TABS Take 1 tablet (1,000 Units) by mouth daily 30 tablet 3   Reviewed medications with Lazaro.      Physical Exam:   Temp:  [98.3  F (36.8  C)] 98.3  F (36.8  C)  Pulse:  [86] 86  Resp:  [18] 18  BP: (126)/(68) 126/68  SpO2:  [97 %] 97 %  Wt Readings from Last 4 Encounters:   01/25/22 77.3 kg (170 lb 6.7 oz)   01/07/22 74.6 kg (164 lb 7.4 oz)   11/30/21 75 kg (165 lb 5.5 oz)   11/02/21 79.7 kg (175 lb 11.3 oz)     Ht Readings from Last 2 Encounters:   01/25/22 1.832 m (6' 0.13\")   01/07/22 1.839 m (6' 0.4\")     General: Lazaro is alert, interactive and appropriate, well-appearing, in no distress  HEENT: Skull is atrauamatic and normocephalic.  Full head of hair. PERRLA, sclera are non icteric and not injected, EOM are intact. Nares are patent without drainage. Oropharynx clear, no plaques noted. No mucositis. MMM.  Neck:  Supple without lymphadenopathy.   Lymph: There is no cervical, supraclavicular, axillary, lymphadenopathy palpated.  Cardiovascular:  HR is regular, S1, S2 no murmur.  Capillary refill is < 2 seconds.   Respiratory: No cough noted. Respirations are easy. Lungs are clear to auscultation throughout.  No crackles or wheezes.  Gastrointestinal: BS present in all quadrants.  Abdomen is soft and flat.  No pain with palpation. No hepatosplenomegaly or masses are palpated.  Skin: No concerning lesions. No rashes, bruises.  Old port site is c/d/i.  Neurological:  CN 2-12 grossly intact. Gait is normal.  No issues with balance. Sensation intact in hands and feet.     Musculoskeletal: Good strength and ROM in all extremities.  Strong dorsiflexion at ankles and great toes (5/5) bilaterally without any pain at the Achilles.     Labs:   Results for orders placed or " performed in visit on 01/25/22   Reticulocyte count     Status: Abnormal   Result Value Ref Range    % Reticulocyte 3.8 (H) 0.5 - 2.0 %    Absolute Reticulocyte 0.137 (H) 0.025 - 0.095 10e6/uL   Ferritin     Status: Normal   Result Value Ref Range    Ferritin 215 26 - 388 ng/mL   CBC with platelets and differential     Status: Abnormal   Result Value Ref Range    WBC Count 4.6 4.0 - 11.0 10e3/uL    RBC Count 3.62 (L) 4.40 - 5.90 10e6/uL    Hemoglobin 12.8 (L) 13.3 - 17.7 g/dL    Hematocrit 38.5 (L) 40.0 - 53.0 %     (H) 78 - 100 fL    MCH 35.4 (H) 26.5 - 33.0 pg    MCHC 33.2 31.5 - 36.5 g/dL    RDW 12.4 10.0 - 15.0 %    Platelet Count 224 150 - 450 10e3/uL    % Neutrophils 69 %    % Lymphocytes 18 %    % Monocytes 11 %    % Eosinophils 1 %    % Basophils 1 %    % Immature Granulocytes 0 %    NRBCs per 100 WBC 0 <1 /100    Absolute Neutrophils 3.2 1.6 - 8.3 10e3/uL    Absolute Lymphocytes 0.8 0.8 - 5.3 10e3/uL    Absolute Monocytes 0.5 0.0 - 1.3 10e3/uL    Absolute Eosinophils 0.0 0.0 - 0.7 10e3/uL    Absolute Basophils 0.0 0.0 - 0.2 10e3/uL    Absolute Immature Granulocytes 0.0 <=0.4 10e3/uL    Absolute NRBCs 0.0 10e3/uL   CBC with Platelets & Differential     Status: Abnormal    Narrative    The following orders were created for panel order CBC with Platelets & Differential.  Procedure                               Abnormality         Status                     ---------                               -----------         ------                     CBC with platelets and d...[[id number]]  Abnormal            Final result                 Please view results for these tests on the individual orders.     Assessment:  Lazaro Lund is a 21 year old young man with a history of T cell lymphoblastic lymphoma (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  He was treated per [facility] protocol [id number]. He had a CRu following Induction with a CR at the end of Consolidation. His course was complicated by extensive bilateral DVT and severe  necrotizing pancreatitis requiring cessation of PEG-asparaginase from his treatment.  He completed therapy on 12/23/21 and comes to clinic today for his first off therapy visit.  He has recovered from recent COVID infection and his resulting anemia has also improved.  He is doing very well.     Plan:   1) Labs reviewed with Lazaro, nice improvement in hemoglobin.  2) Ferritin is normal, he received 6 PRBC transfusions, no concerns for iron overload.  3) End of therapy echo looked good.  Total anthracycline dose= 175mg/m2, he will need echos every 5 years.  4) Continue to recommend that he take Vit D 1000 IU daily.   5) Bactrim for PCP prophylaxis, will need to continue until he is 3 months off therapy (end of March).   6) Discussed fertility, most people will retain fertility following this therapy.  Lazaro did sperm bank but elected not to keep the specimen. He should assume that he is fertile, offered repeat semen analysis in the future if he is interested.  Would ideally wait until he is 1 year off therapy.  7) Consider COVID vaccine at next visit.  8) Plan for vaccine titers when Lazaro is 6 months off therapy.  9) RTC in 1 month for follow up.    Félix Van, TATY    Total time spent on the following services on the date of the encounter: 45 minutes  Preparing to see patient with chart review    Ordering medications, labs tests, chemotherapy   Communicating with other healthcare professionals and care coordination  Interpretation of labs  Performing a medically appropriate examination   Counseling and educating the patient/family/caregiver   Communicating results to the patient/family/caregiver   Documenting clinical information in the electronic health record

## 2022-01-25 NOTE — NURSING NOTE
"Chief Complaint   Patient presents with     Follow Up     Exam and Labs     /68   Pulse 86   Temp 98.3  F (36.8  C) (Oral)   Resp 18   Ht 1.832 m (6' 0.13\")   Wt 77.3 kg (170 lb 6.7 oz)   SpO2 97%   BMI 23.03 kg/m      No Pain (0)  Data Unavailable    I have reviewed the patients medications and allergies    Height/weight double check needed? No    Peds Outpatient BP  1) Rested for 5 minutes, BP taken on bare arm, patient sitting (or supine for infants) w/ legs uncrossed?   Yes  2) Right arm used?      Yes  3) Arm circumference of largest part of upper arm (in cm):  30.2cm  4) BP cuff sized used: Adult (25-32cm)   If used different size cuff then what was recommended why? N/A  5) First BP reading:machine   BP Readings from Last 1 Encounters:   01/25/22 126/68      Is reading >90%?No   (90% for <1 years is 90/50)  (90% for >18 years is 140/90)  *If a machine BP is at or above 90% take manual BP  6) Manual BP reading: N/A  7) Other comments: None          Sandrita Ruiz CMA  January 25, 2022  "

## 2022-01-25 NOTE — LETTER
1/25/2022      RE: Lazaro Lund  [address] [facility] [address]       Pediatric Hematology/Oncology Clinic Note     Lazaro Lund is a 21 year old young man with a history of T-cell lymphoblastic lymphoma. Lazaro presented with acute onset cough and was found to have an anterior mediastinal mass and malignant left-sided pleural effusion.  A CT guided biopsy was obtained at [facility] and pathology was consistent with T-cell leukemia vs lymphoma.  He was admitted to [facility] oncology service 8/30 and started on treatment per [id number].  He had a pleural effusion that required a chest tube and a pericardial effusion that was not drained. His Induction was complicated by cardiac compression secondary to his mass leading to tamponade, this improved through out his hospital stay.  He also had dysphagia that improved with treatment of his mass, swallow study was normal. His course was complicated by extensive bilateral UE DVTs, and he was successfully treated with anticoagulation. His consolidation course was complicated by the development of severe asparaginase induced necrotizing pancreatitis. He became quite ill with SIRS and associated hypotension, he required pressor support in the ICU. As a result, asparaginase was eliminated from his treatment plan. He was in CR status at end of consolidation. During maintenance, he developed hepatotoxicity so allopurinol and dose reduced 6MP were started for correction of skewed 6-MP metabolism.  Lazaro was treated per [facility] protocol [id number] and completed therapy on 12/23/21, he comes to clinic today for his first off therapy visit.    HPI:   Since Lazaro's last visit he has been doing well.  His fatigue has fully resolved following COVID.  He is working out at the gym most days of the week and feels really great.  His work has been slow, he's been working only about 20 hours per week so is looking for a new job.  He is eating well, no GI upset.  He has regained  the weight he lost during COVID.  No fevers.  He denies pain or skin concerns.    Lazaro's mood is great, he is looking forward to a trip to [place] with some friends in March.  He is sleeping well at night.  No orthopnea or SOB.    Review of systems:  The 10 point Review of Systems is negative other than noted in the HPI or here.      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 Former daily marajuana smoker, now only uses occasionally    PFMH:   Family History   Problem Relation Age of Onset     No Known Problems Mother      No Known Problems Father      Asthma Brother      Thyroid Cancer Paternal Grandmother      Melanoma Paternal Aunt        Social History:   Social History     Social History Narrative    Lazaro previously lived at home with his dad and step mom in [place] but is now staying with his grandma in [place]. Biological mother is involved in his life. Has 4 step-siblings and 1 biological sibling. 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 Has completed high school. 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  He has been enjoying fishing and video games.        Current Medications:  Current Outpatient Medications   Medication Sig  "Dispense Refill     polyethylene glycol (MIRALAX) 17 GM/Dose powder Take 17 g (1 capful) by mouth daily as needed for constipation 500 g 1     sulfamethoxazole-trimethoprim (BACTRIM DS) 800-160 MG tablet Take 1 tablet by mouth Every Mon, Tues two times daily 16 tablet 11     Vitamin D, Cholecalciferol, 25 MCG (1000 UT) TABS Take 1 tablet (1,000 Units) by mouth daily 30 tablet 3   Reviewed medications with Lazaro.      Physical Exam:   Temp:  [98.3  F (36.8  C)] 98.3  F (36.8  C)  Pulse:  [86] 86  Resp:  [18] 18  BP: (126)/(68) 126/68  SpO2:  [97 %] 97 %  Wt Readings from Last 4 Encounters:   01/25/22 77.3 kg (170 lb 6.7 oz)   01/07/22 74.6 kg (164 lb 7.4 oz)   11/30/21 75 kg (165 lb 5.5 oz)   11/02/21 79.7 kg (175 lb 11.3 oz)     Ht Readings from Last 2 Encounters:   01/25/22 1.832 m (6' 0.13\")   01/07/22 1.839 m (6' 0.4\")     General: Lazaro is alert, interactive and appropriate, well-appearing, in no distress  HEENT: Skull is atrauamatic and normocephalic.  Full head of hair. PERRLA, sclera are non icteric and not injected, EOM are intact. Nares are patent without drainage. Oropharynx clear, no plaques noted. No mucositis. MMM.  Neck:  Supple without lymphadenopathy.   Lymph: There is no cervical, supraclavicular, axillary, lymphadenopathy palpated.  Cardiovascular:  HR is regular, S1, S2 no murmur.  Capillary refill is < 2 seconds.   Respiratory: No cough noted. Respirations are easy. Lungs are clear to auscultation throughout.  No crackles or wheezes.  Gastrointestinal: BS present in all quadrants.  Abdomen is soft and flat.  No pain with palpation. No hepatosplenomegaly or masses are palpated.  Skin: No concerning lesions. No rashes, bruises.  Old port site is c/d/i.  Neurological:  CN 2-12 grossly intact. Gait is normal.  No issues with balance. Sensation intact in hands and feet.     Musculoskeletal: Good strength and ROM in all extremities.  Strong dorsiflexion at ankles and great toes (5/5) bilaterally " without any pain at the Achilles.     Labs:   Results for orders placed or performed in visit on 01/25/22   Reticulocyte count     Status: Abnormal   Result Value Ref Range    % Reticulocyte 3.8 (H) 0.5 - 2.0 %    Absolute Reticulocyte 0.137 (H) 0.025 - 0.095 10e6/uL   Ferritin     Status: Normal   Result Value Ref Range    Ferritin 215 26 - 388 ng/mL   CBC with platelets and differential     Status: Abnormal   Result Value Ref Range    WBC Count 4.6 4.0 - 11.0 10e3/uL    RBC Count 3.62 (L) 4.40 - 5.90 10e6/uL    Hemoglobin 12.8 (L) 13.3 - 17.7 g/dL    Hematocrit 38.5 (L) 40.0 - 53.0 %     (H) 78 - 100 fL    MCH 35.4 (H) 26.5 - 33.0 pg    MCHC 33.2 31.5 - 36.5 g/dL    RDW 12.4 10.0 - 15.0 %    Platelet Count 224 150 - 450 10e3/uL    % Neutrophils 69 %    % Lymphocytes 18 %    % Monocytes 11 %    % Eosinophils 1 %    % Basophils 1 %    % Immature Granulocytes 0 %    NRBCs per 100 WBC 0 <1 /100    Absolute Neutrophils 3.2 1.6 - 8.3 10e3/uL    Absolute Lymphocytes 0.8 0.8 - 5.3 10e3/uL    Absolute Monocytes 0.5 0.0 - 1.3 10e3/uL    Absolute Eosinophils 0.0 0.0 - 0.7 10e3/uL    Absolute Basophils 0.0 0.0 - 0.2 10e3/uL    Absolute Immature Granulocytes 0.0 <=0.4 10e3/uL    Absolute NRBCs 0.0 10e3/uL   CBC with Platelets & Differential     Status: Abnormal    Narrative    The following orders were created for panel order CBC with Platelets & Differential.  Procedure                               Abnormality         Status                     ---------                               -----------         ------                     CBC with platelets and d...[[id number]]  Abnormal            Final result                 Please view results for these tests on the individual orders.     Assessment:  Lazaro Lund is a 21 year old young man with a history of T cell lymphoblastic lymphoma (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  He was treated per [facility] protocol [id number]. He had a CRu following Induction with a CR at the end of  Consolidation. His course was complicated by extensive bilateral DVT and severe necrotizing pancreatitis requiring cessation of PEG-asparaginase from his treatment.  He completed therapy on 12/23/21 and comes to clinic today for his first off therapy visit.  He has recovered from recent COVID infection and his resulting anemia has also improved.  He is doing very well.     Plan:   1) Labs reviewed with Lazaro, nice improvement in hemoglobin.  2) Ferritin is normal, he received 6 PRBC transfusions, no concerns for iron overload.  3) End of therapy echo looked good.  Total anthracycline dose= 175mg/m2, he will need echos every 5 years.  4) Continue to recommend that he take Vit D 1000 IU daily.   5) Bactrim for PCP prophylaxis, will need to continue until he is 3 months off therapy (end of March).   6) Discussed fertility, most people will retain fertility following this therapy.  Lazaro did sperm bank but elected not to keep the specimen. He should assume that he is fertile, offered repeat semen analysis in the future if he is interested.  Would ideally wait until he is 1 year off therapy.  7) Consider COVID vaccine at next visit.  8) Plan for vaccine titers when Lazaro is 6 months off therapy.  9) RTC in 1 month for follow up.    Félix Van CNP    Total time spent on the following services on the date of the encounter: 45 minutes  Preparing to see patient with chart review    Ordering medications, labs tests, chemotherapy   Communicating with other healthcare professionals and care coordination  Interpretation of labs  Performing a medically appropriate examination   Counseling and educating the patient/family/caregiver   Communicating results to the patient/family/caregiver   Documenting clinical information in the electronic health record         YOHAN Dunn CNP

## 2022-02-12 ENCOUNTER — HEALTH MAINTENANCE LETTER (OUTPATIENT)
Age: 22
End: 2022-02-12

## 2022-02-22 ENCOUNTER — OFFICE VISIT (OUTPATIENT)
Dept: PEDIATRIC HEMATOLOGY/ONCOLOGY | Facility: CLINIC | Age: 22
End: 2022-02-22
Attending: NURSE PRACTITIONER
Payer: COMMERCIAL

## 2022-02-22 VITALS
DIASTOLIC BLOOD PRESSURE: 61 MMHG | WEIGHT: 178.79 LBS | SYSTOLIC BLOOD PRESSURE: 99 MMHG | TEMPERATURE: 98.3 F | OXYGEN SATURATION: 98 % | HEART RATE: 103 BPM | RESPIRATION RATE: 18 BRPM | HEIGHT: 72 IN | BODY MASS INDEX: 24.22 KG/M2

## 2022-02-22 DIAGNOSIS — C83.50 T LYMPHOBLASTIC LYMPHOMA (H): ICD-10-CM

## 2022-02-22 LAB
BASOPHILS # BLD AUTO: 0 10E3/UL (ref 0–0.2)
BASOPHILS NFR BLD AUTO: 0 %
EOSINOPHIL # BLD AUTO: 0.1 10E3/UL (ref 0–0.7)
EOSINOPHIL NFR BLD AUTO: 2 %
ERYTHROCYTE [DISTWIDTH] IN BLOOD BY AUTOMATED COUNT: 10.9 % (ref 10–15)
HCT VFR BLD AUTO: 41.1 % (ref 40–53)
HGB BLD-MCNC: 14.3 G/DL (ref 13.3–17.7)
IMM GRANULOCYTES # BLD: 0 10E3/UL
IMM GRANULOCYTES NFR BLD: 0 %
LYMPHOCYTES # BLD AUTO: 1.1 10E3/UL (ref 0.8–5.3)
LYMPHOCYTES NFR BLD AUTO: 19 %
MCH RBC QN AUTO: 33.3 PG (ref 26.5–33)
MCHC RBC AUTO-ENTMCNC: 34.8 G/DL (ref 31.5–36.5)
MCV RBC AUTO: 96 FL (ref 78–100)
MONOCYTES # BLD AUTO: 0.6 10E3/UL (ref 0–1.3)
MONOCYTES NFR BLD AUTO: 10 %
NEUTROPHILS # BLD AUTO: 4 10E3/UL (ref 1.6–8.3)
NEUTROPHILS NFR BLD AUTO: 69 %
NRBC # BLD AUTO: 0 10E3/UL
NRBC BLD AUTO-RTO: 0 /100
PLATELET # BLD AUTO: 246 10E3/UL (ref 150–450)
RBC # BLD AUTO: 4.3 10E6/UL (ref 4.4–5.9)
WBC # BLD AUTO: 5.8 10E3/UL (ref 4–11)

## 2022-02-22 PROCEDURE — 86317 IMMUNOASSAY INFECTIOUS AGENT: CPT | Performed by: NURSE PRACTITIONER

## 2022-02-22 PROCEDURE — 86658 ENTEROVIRUS ANTIBODY: CPT | Mod: 59 | Performed by: NURSE PRACTITIONER

## 2022-02-22 PROCEDURE — 99215 OFFICE O/P EST HI 40 MIN: CPT | Performed by: NURSE PRACTITIONER

## 2022-02-22 PROCEDURE — 86765 RUBEOLA ANTIBODY: CPT | Performed by: NURSE PRACTITIONER

## 2022-02-22 PROCEDURE — 36415 COLL VENOUS BLD VENIPUNCTURE: CPT | Performed by: NURSE PRACTITIONER

## 2022-02-22 PROCEDURE — 86735 MUMPS ANTIBODY: CPT | Performed by: NURSE PRACTITIONER

## 2022-02-22 PROCEDURE — 85025 COMPLETE CBC W/AUTO DIFF WBC: CPT | Performed by: NURSE PRACTITIONER

## 2022-02-22 PROCEDURE — 86317 IMMUNOASSAY INFECTIOUS AGENT: CPT | Mod: 59 | Performed by: NURSE PRACTITIONER

## 2022-02-22 PROCEDURE — 86708 HEPATITIS A ANTIBODY: CPT | Performed by: NURSE PRACTITIONER

## 2022-02-22 PROCEDURE — 86787 VARICELLA-ZOSTER ANTIBODY: CPT | Performed by: NURSE PRACTITIONER

## 2022-02-22 PROCEDURE — 86706 HEP B SURFACE ANTIBODY: CPT | Performed by: NURSE PRACTITIONER

## 2022-02-22 PROCEDURE — 86762 RUBELLA ANTIBODY: CPT | Performed by: NURSE PRACTITIONER

## 2022-02-22 PROCEDURE — G0463 HOSPITAL OUTPT CLINIC VISIT: HCPCS

## 2022-02-22 RX ORDER — SULFAMETHOXAZOLE/TRIMETHOPRIM 800-160 MG
1 TABLET ORAL
Qty: 16 TABLET | Refills: 1 | Status: SHIPPED | OUTPATIENT
Start: 2022-02-22 | End: 2022-04-05

## 2022-02-22 ASSESSMENT — PAIN SCALES - GENERAL: PAINLEVEL: NO PAIN (0)

## 2022-02-22 NOTE — NURSING NOTE
"Chief Complaint   Patient presents with     RECHECK     Pt here for T lymphoblastic lymphoma     BP 99/61 (BP Location: Right arm, Patient Position: Sitting, Cuff Size: Adult Large)   Pulse 103   Temp 98.3  F (36.8  C) (Oral)   Resp 18   Ht 1.834 m (6' 0.21\")   Wt 81.1 kg (178 lb 12.7 oz)   SpO2 98%   BMI 24.11 kg/m      No Pain (0)  Data Unavailable    I have reviewed the patients medications and allergies    Height/weight double check needed? No    Peds Outpatient BP  1) Rested for 5 minutes, BP taken on bare arm, patient sitting (or supine for infants) w/ legs uncrossed?   Yes  2) Right arm used?  Right arm   Yes  3) Arm circumference of largest part of upper arm (in cm): 34cm  4) BP cuff sized used: Large Adult (32-43cm)   If used different size cuff then what was recommended why? N/A  5) First BP reading:machine   BP Readings from Last 1 Encounters:   02/22/22 99/61      Is reading >90%?No   (90% for <1 years is 90/50)  (90% for >18 years is 140/90)  *If a machine BP is at or above 90% take manual BP  6) Manual BP reading: N/A  7) Other comments: None          Andrews Chavez, EMT  February 22, 2022  "

## 2022-02-22 NOTE — LETTER
2/22/2022      RE: Lazaro Lund  [address] [facility] [id number]       Pediatric Hematology/Oncology Clinic Note     Lazaro Lund is a 21 year old young man with a history of T-cell lymphoblastic lymphoma. Lazaro presented with acute onset cough and was found to have an anterior mediastinal mass and malignant left-sided pleural effusion.  A CT guided biopsy was obtained at [facility] and pathology was consistent with T-cell leukemia vs lymphoma.  He was admitted to [facility] oncology service 8/30 and started on treatment per [id number].  He had a pleural effusion that required a chest tube and a pericardial effusion that was not drained. His Induction was complicated by cardiac compression secondary to his mass leading to tamponade, this improved through out his hospital stay.  He also had dysphagia that improved with treatment of his mass, swallow study was normal. His course was complicated by extensive bilateral UE DVTs, and he was successfully treated with anticoagulation. His consolidation course was complicated by the development of severe asparaginase induced necrotizing pancreatitis. He became quite ill with SIRS and associated hypotension, he required pressor support in the ICU. As a result, asparaginase was eliminated from his treatment plan. He was in CR status at end of consolidation. During maintenance, he developed hepatotoxicity so allopurinol and dose reduced 6MP were started for correction of skewed 6-MP metabolism.  Lazaro was [place] [facility] [place] [id number] and completed therapy on 12/23/21, he comes to clinic today for an off therapy visit.    HPI:   Debra has been doing well since his last visit. He has had a good energy level and has been able to work about part time, and trying to work overtime for extra hours. His mood has been good.     He has not had any fevers, signs of illness, headaches, dizziness, aches or pains anywhere.   He is eating well, eats a variety of food  groups including sources of protein. No nausea or vomiting. At the last visit he had been having issues with constipation, but this has since resolved and has not taken any Miralax this month. No blood or mucous in the stools.      He notes dry skin and lesions that look sort of like acne on the upper left arm. There were two spots that had yellow/green puss inside and he popped those, they seem to be resolving.    Lazaro believes he has missed his Monday, Tuesday doses of Bactrim for the past 1-2 weeks and needs a refill today.       Review of systems:  The 10 point Review of Systems is negative other than noted in the HPI or here.      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 Former daily marajuana smoker, now only uses occasionally    PFMH:   Family History   Problem Relation Age of Onset     No Known Problems Mother      No Known Problems Father      Asthma Brother      Thyroid Cancer Paternal Grandmother      Melanoma Paternal Aunt        Social History:   Social History     Social History Narrative    Lazaro previously lived at home with his dad and step mom in [place] but is now staying with his grandma in [place]. Biological mother is involved in his life. Has 4 step-siblings and 1 biological sibling. 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 Has completed high school. 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  He has been enjoying fishing and video games.        Current Medications:  Current Outpatient Medications   Medication Sig Dispense Refill     polyethylene glycol (MIRALAX) 17 GM/Dose powder Take 17 g (1 capful) by mouth daily as needed for constipation 500 g 1     sulfamethoxazole-trimethoprim (BACTRIM DS) 800-160 MG tablet Take 1 tablet by mouth Every Mon, Tues two times daily 16 tablet 11     Vitamin D, Cholecalciferol, 25 MCG (1000 UT) TABS Take 1 tablet (1,000 Units) by mouth daily 30 tablet 3   Reviewed medications with Lazaro, missed doses of bactrim.     Physical Exam:   Temp:  [98.3  F (36.8  C)] 98.3  F (36.8  C)  Pulse:  [103] 103  Resp:  [18] 18  BP: (99)/(61) 99/61  SpO2:  [98 %] 98 %  Wt Readings from Last 4 Encounters:   02/22/22 178 lb 12.7 oz (81.1 kg)   01/25/22 170 lb 6.7 oz (77.3 kg)   01/07/22 164 lb 7.4 oz (74.6 kg)   11/30/21 165 lb 5.5 oz (75 kg)     Ht Readings from Last 2 Encounters:   02/22/22 6' 0.21\" (1.834 m)   01/25/22 6' 0.13\" (1.832 m)     General: Lazaro is alert, interactive and appropriate, well-appearing, in no distress  HEENT: Skull is atrauamatic and normocephalic.  Full head of hair. PERRLA, sclera are non icteric and not injected, EOM are intact. Nares are patent without drainage. Oropharynx clear, no plaques noted. No mucositis. MMM.  Neck:  Supple without lymphadenopathy.   Lymph: There is no cervical, supraclavicular, axillary, lymphadenopathy palpated.  Cardiovascular:  HR is regular, S1, S2 no murmur.  Capillary refill is < 2 seconds.   Respiratory: No cough noted. Respirations are easy. Lungs are clear to auscultation throughout.  No crackles or wheezes.  Gastrointestinal: BS present in all quadrants.  Abdomen is soft and flat.  No pain with palpation. No hepatosplenomegaly or masses are " palpated.  Skin: Dry macules with scaling on bilateral upper arms.  A few excoriated and scabbed areas.  Old port site is c/d/i.  Neurological:  CN 2-12 grossly intact. Gait is normal.  No issues with balance. Sensation intact in hands and feet.     Musculoskeletal: Good strength and ROM in all extremities.  5/5 strength with dorsiflexion and plantarflexion at ankles, and with  strength.      Labs:   Results for orders placed or performed in visit on 02/22/22   CBC with platelets and differential     Status: Abnormal   Result Value Ref Range    WBC Count 5.8 4.0 - 11.0 10e3/uL    RBC Count 4.30 (L) 4.40 - 5.90 10e6/uL    Hemoglobin 14.3 13.3 - 17.7 g/dL    Hematocrit 41.1 40.0 - 53.0 %    MCV 96 78 - 100 fL    MCH 33.3 (H) 26.5 - 33.0 pg    MCHC 34.8 31.5 - 36.5 g/dL    RDW 10.9 10.0 - 15.0 %    Platelet Count 246 150 - 450 10e3/uL    % Neutrophils 69 %    % Lymphocytes 19 %    % Monocytes 10 %    % Eosinophils 2 %    % Basophils 0 %    % Immature Granulocytes 0 %    NRBCs per 100 WBC 0 <1 /100    Absolute Neutrophils 4.0 1.6 - 8.3 10e3/uL    Absolute Lymphocytes 1.1 0.8 - 5.3 10e3/uL    Absolute Monocytes 0.6 0.0 - 1.3 10e3/uL    Absolute Eosinophils 0.1 0.0 - 0.7 10e3/uL    Absolute Basophils 0.0 0.0 - 0.2 10e3/uL    Absolute Immature Granulocytes 0.0 <=0.4 10e3/uL    Absolute NRBCs 0.0 10e3/uL   CBC with Platelets & Differential     Status: Abnormal    Narrative    The following orders were created for panel order CBC with Platelets & Differential.  Procedure                               Abnormality         Status                     ---------                               -----------         ------                     CBC with platelets and d...[[id number]]  Abnormal            Final result                 Please view results for these tests on the individual orders.       Assessment:  Lazaro Lund is a 21 year old young man with a history of T cell lymphoblastic lymphoma (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  He was  treated [place] [facility] [place] [id number]. He had a CRu following Induction with a CR at the end of Consolidation. His course was complicated by extensive bilateral DVT and severe necrotizing pancreatitis requiring cessation of PEG-asparaginase from his treatment.  He completed therapy on 12/23/21 and comes to clinic today for an off therapy visit. New eczema.      Plan:   1) Labs reviewed with Lazaro. Hemoglobin within normal range and recovered from last visit.   2) Vaccine titers drawn today due to impending insurance changes. Would still recommend he wait until 6 months off therapy before receiving vaccines. Will review results at next visit.  3) Skin findings consistent with eczema. Recommended the use of hydrocortisone 1% followed by Aquaphor.   4) Declined COVID vaccine today.  5) Bactrim for PCP prophylaxis, will need to continue until he is 3 months off therapy (end of March). New rx sent to local pharmacy.  6) Use Miralax as needed if constipation resumes.   7) End of therapy echo done on 1/7/22.  Total anthracycline dose= 175mg/m2, he will need echos every 5 years (2027)  8) Continue to recommend that he take Vit D 1000 IU daily.   9) Discussed fertility at the past visit. Lazaro did sperm bank but elected not to keep the specimen. He should assume that he is fertile, offered repeat semen analysis in the future if he is interested.  Would ideally wait until he is 1 year off therapy.  10) RTC in 1 month. We will not draw labs at that visit, plan to check labs Q2 months with CBC.     Note scribed by Lauren Ortiz, student nurse practitioner.     The documentation recorded by the scribe accurately reflects the services I personally performed and the decisions made by me.  YOHAN Dunn CNP    Félix Van CNP    Total time spent on the following services on the date of the encounter: 45 minutes  Preparing to see patient with chart review    Ordering medications, labs tests  Communicating with other  healthcare professionals and care coordination  Interpretation of labs  Performing a medically appropriate examination   Counseling and educating the patient/family/caregiver   Communicating results to the patient/family/caregiver   Documenting clinical information in the electronic health record         YOHAN Dunn CNP

## 2022-02-22 NOTE — PROGRESS NOTES
Pediatric Hematology/Oncology Clinic Note     Lazaro Lund is a 21 year old young man with a history of T-cell lymphoblastic lymphoma. Lazaro presented with acute onset cough and was found to have an anterior mediastinal mass and malignant left-sided pleural effusion.  A CT guided biopsy was obtained at [facility] and pathology was consistent with T-cell leukemia vs lymphoma.  He was admitted to [facility] oncology service 8/30 and started on treatment per [id number].  He had a pleural effusion that required a chest tube and a pericardial effusion that was not drained. His Induction was complicated by cardiac compression secondary to his mass leading to tamponade, this improved through out his hospital stay.  He also had dysphagia that improved with treatment of his mass, swallow study was normal. His course was complicated by extensive bilateral UE DVTs, and he was successfully treated with anticoagulation. His consolidation course was complicated by the development of severe asparaginase induced necrotizing pancreatitis. He became quite ill with SIRS and associated hypotension, he required pressor support in the ICU. As a result, asparaginase was eliminated from his treatment plan. He was in CR status at end of consolidation. During maintenance, he developed hepatotoxicity so allopurinol and dose reduced 6MP were started for correction of skewed 6-MP metabolism.  Lazaro was [facility] [id number] and completed therapy on 12/23/21, he comes to clinic today for an off therapy visit.    HPI:   Debra has been doing well since his last visit. He has had a good energy level and has been able to work about part time, and trying to work overtime for extra hours. His mood has been good.     He has not had any fevers, signs of illness, headaches, dizziness, aches or pains anywhere.   He is eating well, eats a variety of food groups including sources of protein. No nausea or vomiting. At the last visit he had  been having issues with constipation, but this has since resolved and has not taken any Miralax this month. No blood or mucous in the stools.      He notes dry skin and lesions that look sort of like acne on the upper left arm. There were two spots that had yellow/green pus inside and he popped those, they seem to be resolving.    Lazaro believes he has missed his Monday, Tuesday doses of Bactrim for the past 1-2 weeks and needs a refill today.       Review of systems:  The 10 point Review of Systems is negative other than noted in the HPI or here.      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 Former daily marajuana smoker, now only uses occasionally    PFMH:   Family History   Problem Relation Age of Onset     No Known Problems Mother      No Known Problems Father      Asthma Brother      Thyroid Cancer Paternal Grandmother      Melanoma Paternal Aunt        Social History:   Social History     Social History Narrative    Lazaro previously lived at home with his dad and step mom in [place] but is now staying with his grandma in [place]. Biological mother is involved in his life. Has 4 step-siblings and 1 biological sibling. 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 Has completed high school. 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  He has been enjoying fishing and video games.        Current Medications:  Current Outpatient Medications   Medication Sig Dispense Refill     polyethylene glycol (MIRALAX) 17 GM/Dose powder Take 17 g (1 capful) by mouth daily as needed for constipation 500 g 1     sulfamethoxazole-trimethoprim (BACTRIM DS) 800-160 MG tablet Take 1 tablet by mouth Every Mon, Tues two times daily 16 tablet 11     Vitamin D, Cholecalciferol, 25 MCG (1000 UT) TABS Take 1 tablet (1,000 Units) by mouth daily 30 tablet 3   Reviewed medications with Lazaro, missed doses of bactrim.     Physical Exam:   Temp:  [98.3  F (36.8  C)] 98.3  F (36.8  C)  Pulse:  [103] 103  Resp:  [18] 18  BP: (99)/(61) 99/61  SpO2:  [98 %] 98 %  Wt Readings from Last 4 Encounters:   02/22/22 178 lb 12.7 oz (81.1 kg)   01/25/22 170 lb 6.7 oz (77.3 kg)   01/07/22 164 lb 7.4 oz (74.6 kg)   11/30/21 165 lb 5.5 oz (75 kg)     Ht Readings from Last 2 Encounters:   02/22/22 6' 0.21\" (1.834 m)   01/25/22 6' 0.13\" (1.832 m)     General: Lazaro is alert, interactive and appropriate, well-appearing, in no distress  HEENT: Skull is atrauamatic and normocephalic.  Full head of hair. PERRLA, sclera are non icteric and not injected, EOM are intact. Nares are patent without drainage. Oropharynx clear, no plaques noted. No mucositis. MMM.  Neck:  Supple without lymphadenopathy.   Lymph: There is no cervical, supraclavicular, axillary, lymphadenopathy palpated.  Cardiovascular:  HR is regular, S1, S2 no murmur.  Capillary refill is < 2 seconds.   Respiratory: No cough noted. Respirations are easy. Lungs are clear to auscultation throughout.  No crackles or wheezes.  Gastrointestinal: BS present in all quadrants.  Abdomen is soft and flat.  No pain with palpation. No hepatosplenomegaly or masses are palpated.  Skin: Dry macules with scaling on bilateral upper arms.  A few excoriated " and scabbed areas.  Old port site is c/d/i.  Neurological:  CN 2-12 grossly intact. Gait is normal.  No issues with balance. Sensation intact in hands and feet.     Musculoskeletal: Good strength and ROM in all extremities.  5/5 strength with dorsiflexion and plantarflexion at ankles, and with  strength.      Labs:   Results for orders placed or performed in visit on 02/22/22   CBC with platelets and differential     Status: Abnormal   Result Value Ref Range    WBC Count 5.8 4.0 - 11.0 10e3/uL    RBC Count 4.30 (L) 4.40 - 5.90 10e6/uL    Hemoglobin 14.3 13.3 - 17.7 g/dL    Hematocrit 41.1 40.0 - 53.0 %    MCV 96 78 - 100 fL    MCH 33.3 (H) 26.5 - 33.0 pg    MCHC 34.8 31.5 - 36.5 g/dL    RDW 10.9 10.0 - 15.0 %    Platelet Count 246 150 - 450 10e3/uL    % Neutrophils 69 %    % Lymphocytes 19 %    % Monocytes 10 %    % Eosinophils 2 %    % Basophils 0 %    % Immature Granulocytes 0 %    NRBCs per 100 WBC 0 <1 /100    Absolute Neutrophils 4.0 1.6 - 8.3 10e3/uL    Absolute Lymphocytes 1.1 0.8 - 5.3 10e3/uL    Absolute Monocytes 0.6 0.0 - 1.3 10e3/uL    Absolute Eosinophils 0.1 0.0 - 0.7 10e3/uL    Absolute Basophils 0.0 0.0 - 0.2 10e3/uL    Absolute Immature Granulocytes 0.0 <=0.4 10e3/uL    Absolute NRBCs 0.0 10e3/uL   CBC with Platelets & Differential     Status: Abnormal    Narrative    The following orders were created for panel order CBC with Platelets & Differential.  Procedure                               Abnormality         Status                     ---------                               -----------         ------                     CBC with platelets and d...[[id number]]  Abnormal            Final result                 Please view results for these tests on the individual orders.       Assessment:  Lazaro Lund is a 21 year old young man with a history of T cell lymphoblastic lymphoma (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  He was treated per [facility] protocol [id number]. He had a CRu following Induction with a CR at the  end of Consolidation. His course was complicated by extensive bilateral DVT and severe necrotizing pancreatitis requiring cessation of PEG-asparaginase from his treatment.  He completed therapy on 12/23/21 and comes to clinic today for an off therapy visit. New eczema.      Plan:   1) Labs reviewed with Lazaro. Hemoglobin within normal range and recovered from last visit.   2) Vaccine titers drawn today due to impending insurance changes. Would still recommend he wait until 6 months off therapy before receiving vaccines. Will review results at next visit.  3) Skin findings consistent with eczema. Recommended the use of hydrocortisone 1% followed by Aquaphor.   4) Declined COVID vaccine today.  5) Bactrim for PCP prophylaxis, will need to continue until he is 3 months off therapy (end of March). New rx sent to local pharmacy.  6) Use Miralax as needed if constipation resumes.   7) End of therapy echo done on 1/7/22.  Total anthracycline dose= 175mg/m2, he will need echos every 5 years (2027)  8) Continue to recommend that he take Vit D 1000 IU daily.   9) Discussed fertility at the past visit. Lazaro did sperm bank but elected not to keep the specimen. He should assume that he is fertile, offered repeat semen analysis in the future if he is interested.  Would ideally wait until he is 1 year off therapy.  10) RTC in 1 month. We will not draw labs at that visit, plan to check labs Q2 months with CBC.     Note scribed by Lauren Ortiz, student nurse practitioner.     The documentation recorded by the scribe accurately reflects the services I personally performed and the decisions made by me.  Félix Van, YOHAN CNP  Addendum:  Vaccine titers show Lazaro is still immune to: HIB, tetanus, Hep A, Rubeola, Mumps.  He is NOT immune to: polio, diptheria, rubella, pneumococcus, Hep B or varicella. Also recommend HPV vaccine since he never received this series.  He can receive killed vaccines at this time, should wait  on live vaccines until he is 1 year off therapy.     Félix Van CNP    Total time spent on the following services on the date of the encounter: 45 minutes  Preparing to see patient with chart review    Ordering medications, labs tests  Communicating with other healthcare professionals and care coordination  Interpretation of labs  Performing a medically appropriate examination   Counseling and educating the patient/family/caregiver   Communicating results to the patient/family/caregiver   Documenting clinical information in the electronic health record

## 2022-02-23 LAB
HAV IGG SER QL IA: REACTIVE
HBV SURFACE AB SERPL IA-ACNC: 0 M[IU]/ML
MEV IGG SER IA-ACNC: 132 AU/ML
MEV IGG SER IA-ACNC: POSITIVE
MUMPS ANTIBODY IGG INSTRUMENT VALUE: 17 AU/ML
MUV IGG SER QL IA: POSITIVE
RUBV IGG SERPL QL IA: 0.78 INDEX
RUBV IGG SERPL QL IA: ABNORMAL
VZV IGG SER QL IA: 95 INDEX
VZV IGG SER QL IA: ABNORMAL

## 2022-02-24 LAB
C DIPHTHERIAE IGG SER-ACNC: 0.1 IU/ML
C TETANI TOXOID IGG SERPL IA-ACNC: 0.6 IU/ML
HAEM INFLU B IGG SER-MCNC: 1.3 UG/ML
S PN DA SERO 19F IGG SER-MCNC: 3.77 UG/ML
S PNEUM DA 1 IGG SER-MCNC: 0.15 UG/ML
S PNEUM DA 12F IGG SER-MCNC: 0.07 UG/ML
S PNEUM DA 14 IGG SER-MCNC: 0.12 UG/ML
S PNEUM DA 18C IGG SER-MCNC: 0.24 UG/ML
S PNEUM DA 23F IGG SER-MCNC: 0.17 UG/ML
S PNEUM DA 3 IGG SER-MCNC: 0.28 UG/ML
S PNEUM DA 4 IGG SER-MCNC: 0.02 UG/ML
S PNEUM DA 5 IGG SER-MCNC: 0.41 UG/ML
S PNEUM DA 6B IGG SER-MCNC: 0.31 UG/ML
S PNEUM DA 7F IGG SER-MCNC: 0.19 UG/ML
S PNEUM DA 8 IGG SER-MCNC: 1.28 UG/ML
S PNEUM DA 9N IGG SER-MCNC: 0.04 UG/ML
S PNEUM DA 9V IGG SER-MCNC: 0.04 UG/ML
S PNEUM SEROTYPE IGG SER-IMP: NORMAL

## 2022-03-02 LAB
PV1 NAB TITR SER NT: NORMAL {TITER}
PV3 NAB TITR SER NT: NORMAL {TITER}

## 2022-04-05 ENCOUNTER — OFFICE VISIT (OUTPATIENT)
Dept: PEDIATRIC HEMATOLOGY/ONCOLOGY | Facility: CLINIC | Age: 22
End: 2022-04-05
Attending: NURSE PRACTITIONER
Payer: COMMERCIAL

## 2022-04-05 VITALS
WEIGHT: 189.15 LBS | TEMPERATURE: 98.3 F | RESPIRATION RATE: 18 BRPM | HEIGHT: 72 IN | DIASTOLIC BLOOD PRESSURE: 70 MMHG | HEART RATE: 87 BPM | SYSTOLIC BLOOD PRESSURE: 123 MMHG | BODY MASS INDEX: 25.62 KG/M2 | OXYGEN SATURATION: 97 %

## 2022-04-05 DIAGNOSIS — C83.50 T LYMPHOBLASTIC LYMPHOMA (H): ICD-10-CM

## 2022-04-05 LAB
BASOPHILS # BLD AUTO: 0 10E3/UL (ref 0–0.2)
BASOPHILS NFR BLD AUTO: 1 %
EOSINOPHIL # BLD AUTO: 0.2 10E3/UL (ref 0–0.7)
EOSINOPHIL NFR BLD AUTO: 4 %
ERYTHROCYTE [DISTWIDTH] IN BLOOD BY AUTOMATED COUNT: 11.1 % (ref 10–15)
HCT VFR BLD AUTO: 40.4 % (ref 40–53)
HGB BLD-MCNC: 14.2 G/DL (ref 13.3–17.7)
IMM GRANULOCYTES # BLD: 0 10E3/UL
IMM GRANULOCYTES NFR BLD: 0 %
LYMPHOCYTES # BLD AUTO: 0.8 10E3/UL (ref 0.8–5.3)
LYMPHOCYTES NFR BLD AUTO: 19 %
MCH RBC QN AUTO: 31.1 PG (ref 26.5–33)
MCHC RBC AUTO-ENTMCNC: 35.1 G/DL (ref 31.5–36.5)
MCV RBC AUTO: 89 FL (ref 78–100)
MONOCYTES # BLD AUTO: 0.5 10E3/UL (ref 0–1.3)
MONOCYTES NFR BLD AUTO: 12 %
NEUTROPHILS # BLD AUTO: 2.6 10E3/UL (ref 1.6–8.3)
NEUTROPHILS NFR BLD AUTO: 64 %
NRBC # BLD AUTO: 0 10E3/UL
NRBC BLD AUTO-RTO: 0 /100
PLATELET # BLD AUTO: 222 10E3/UL (ref 150–450)
RBC # BLD AUTO: 4.56 10E6/UL (ref 4.4–5.9)
WBC # BLD AUTO: 4.1 10E3/UL (ref 4–11)

## 2022-04-05 PROCEDURE — 85025 COMPLETE CBC W/AUTO DIFF WBC: CPT | Performed by: NURSE PRACTITIONER

## 2022-04-05 PROCEDURE — G0463 HOSPITAL OUTPT CLINIC VISIT: HCPCS

## 2022-04-05 PROCEDURE — 99214 OFFICE O/P EST MOD 30 MIN: CPT | Performed by: NURSE PRACTITIONER

## 2022-04-05 PROCEDURE — 36415 COLL VENOUS BLD VENIPUNCTURE: CPT | Performed by: NURSE PRACTITIONER

## 2022-04-05 ASSESSMENT — PAIN SCALES - GENERAL: PAINLEVEL: NO PAIN (0)

## 2022-04-05 NOTE — LETTER
4/5/2022      RE: Lazaro Lund  [address] [facility] [address]       Pediatric Hematology/Oncology Clinic Note     Lazaro Lund is a 21 year old young man with a history of T-cell lymphoblastic lymphoma. Lazaro presented with acute onset cough and was found to have an anterior mediastinal mass and malignant left-sided pleural effusion.  A CT guided biopsy was obtained at [facility] and pathology was consistent with T-cell leukemia vs lymphoma.  He was admitted to [facility] oncology service 8/30 and started on treatment per [id number].  He had a pleural effusion that required a chest tube and a pericardial effusion that was not drained. His Induction was complicated by cardiac compression secondary to his mass leading to tamponade, this improved through out his hospital stay.  He also had dysphagia that improved with treatment of his mass, swallow study was normal. His course was complicated by extensive bilateral UE DVTs, and he was successfully treated with anticoagulation. His consolidation course was complicated by the development of severe asparaginase induced necrotizing pancreatitis. He became quite ill with SIRS and associated hypotension, he required pressor support in the ICU. As a result, asparaginase was eliminated from his treatment plan. He was in CR status at end of consolidation. During maintenance, he developed hepatotoxicity so allopurinol and dose reduced 6MP were started for correction of skewed 6-MP metabolism.  Lzaaro was treated per [facility] protocol [id number] and completed therapy on 12/23/21, he comes to clinic today for an off therapy visit.    HPI:   Lazaro's has been doing well since his last visit. He has had a good energy level and is excited that he got a new job!  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  Lazaro denies any pain although his old port site does itch at times.  He is eating well, notes his weight is up and hopes to level out  around his current weight.    Lazaro has not had any fevers, signs of illness, headaches, dizziness.  He is eating well. No nausea or vomiting. No constipation.  Eczema has cleared up.    Review of systems:  The 10 point Review of Systems is negative other than noted in the HPI or here.      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 Former daily marajuana smoker, now only uses occasionally    PFMH:   Family History   Problem Relation Age of Onset     No Known Problems Mother      No Known Problems Father      Asthma Brother      Thyroid Cancer Paternal Grandmother      Melanoma Paternal Aunt        Social History:   Social History     Social History Narrative    Lazaro previously lived at home with his dad and step mom in [place] but is now staying with his grandma in [place]. Biological mother is involved in his life. Has 4 step-siblings and 1 biological sibling. 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 Has completed high school. 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  He has been enjoying fishing and video games.        Current Medications:  Current Outpatient Medications   Medication Sig Dispense Refill     polyethylene glycol  "(MIRALAX) 17 GM/Dose powder Take 17 g (1 capful) by mouth daily as needed for constipation 500 g 1     sulfamethoxazole-trimethoprim (BACTRIM DS) 800-160 MG tablet Take 1 tablet by mouth Every Mon, Tues two times daily 16 tablet 1     Vitamin D, Cholecalciferol, 25 MCG (1000 UT) TABS Take 1 tablet (1,000 Units) by mouth daily 30 tablet 3   Reviewed medications with Lazaro, missed doses of bactrim.     Physical Exam:   Temp:  [98.3  F (36.8  C)] 98.3  F (36.8  C)  Pulse:  [87] 87  Resp:  [18] 18  BP: (123)/(70) 123/70  SpO2:  [97 %] 97 %  Wt Readings from Last 4 Encounters:   04/05/22 85.8 kg (189 lb 2.5 oz)   02/22/22 81.1 kg (178 lb 12.7 oz)   01/25/22 77.3 kg (170 lb 6.7 oz)   01/07/22 74.6 kg (164 lb 7.4 oz)     Ht Readings from Last 2 Encounters:   04/05/22 1.83 m (6' 0.05\")   02/22/22 1.834 m (6' 0.21\")     General: Lazaro is alert, interactive and appropriate, well-appearing, in no distress  HEENT: Skull is atrauamatic and normocephalic.  Full head of hair. PERRLA, sclera are non icteric and not injected, EOM are intact. Nares are patent without drainage. Oropharynx clear, no plaques noted. No mucositis. MMM.  Neck:  Supple without lymphadenopathy.   Lymph: There is no cervical, supraclavicular, axillary, lymphadenopathy palpated.  Cardiovascular:  HR is regular, S1, S2 no murmur.  Capillary refill is < 2 seconds.   Respiratory: No cough noted. Respirations are easy. Lungs are clear to auscultation throughout.  No crackles or wheezes.  Gastrointestinal: BS present in all quadrants.  Abdomen is soft and flat.  No pain with palpation. No hepatosplenomegaly or masses are palpated.  Skin: No rash. Old port site is c/d/i, scar is wide.  Neurological:  CN 2-12 grossly intact. Gait is normal.  No issues with balance. Sensation intact in hands and feet.     Musculoskeletal: Good strength and ROM in all extremities.  5/5 strength with dorsiflexion and plantarflexion at ankles, and with  strength.      Labs: "   Results for orders placed or performed in visit on 04/05/22   CBC with platelets and differential     Status: None   Result Value Ref Range    WBC Count 4.1 4.0 - 11.0 10e3/uL    RBC Count 4.56 4.40 - 5.90 10e6/uL    Hemoglobin 14.2 13.3 - 17.7 g/dL    Hematocrit 40.4 40.0 - 53.0 %    MCV 89 78 - 100 fL    MCH 31.1 26.5 - 33.0 pg    MCHC 35.1 31.5 - 36.5 g/dL    RDW 11.1 10.0 - 15.0 %    Platelet Count 222 150 - 450 10e3/uL    % Neutrophils 64 %    % Lymphocytes 19 %    % Monocytes 12 %    % Eosinophils 4 %    % Basophils 1 %    % Immature Granulocytes 0 %    NRBCs per 100 WBC 0 <1 /100    Absolute Neutrophils 2.6 1.6 - 8.3 10e3/uL    Absolute Lymphocytes 0.8 0.8 - 5.3 10e3/uL    Absolute Monocytes 0.5 0.0 - 1.3 10e3/uL    Absolute Eosinophils 0.2 0.0 - 0.7 10e3/uL    Absolute Basophils 0.0 0.0 - 0.2 10e3/uL    Absolute Immature Granulocytes 0.0 <=0.4 10e3/uL    Absolute NRBCs 0.0 10e3/uL   CBC with Platelets & Differential     Status: None    Narrative    The following orders were created for panel order CBC with Platelets & Differential.  Procedure                               Abnormality         Status                     ---------                               -----------         ------                     CBC with platelets and d...[[id number]]                      Final result                 Please view results for these tests on the individual orders.       Assessment:  Lazaro Lund is a 21 year old young man with a history of T cell lymphoblastic lymphoma (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  He was treated per [facility] protocol [id number]. He had a CRu following Induction with a CR at the end of Consolidation. His course was complicated by extensive bilateral DVT and severe necrotizing pancreatitis requiring cessation of PEG-asparaginase from his treatment.  He completed therapy on 12/23/21 and comes to clinic today for an off therapy visit.      Plan:   1) Labs reviewed with Lazaro, all cell lines look good.  2)  Vaccine titers reviewed today .Lazaro is still immune to: HIB, tetanus, Hep A, Rubeola, Mumps.  He is NOT immune to: polio, diptheria, rubella, pneumococcus, Hep B or varicella. Also recommend HPV vaccine since he never received this series.  He can receive killed vaccines starting in 5/2022, should wait on live vaccines until he is 1 year off therapy (12/2022).   3) Recommend trial of hydrocortisone to scar to see if this helps with itching.  4) OK to stop Bactrim since he is 3 months off therapy.  5) End of therapy echo done on 1/7/22.  Total anthracycline dose= 175mg/m2, he will need echos every 5 years (2027)  6) Continue to recommend that he take Vit D 1000 IU daily.   7) Discussed fertility at the past visit. Lazaro did sperm bank but elected not to keep the specimen. He should assume that he is fertile, offered repeat semen analysis in the future if he is interested.  Would ideally wait until he is 1 year off therapy.  8) RTC in 1 month for follow up visit and labs.    Félix Van CNP    Total time spent on the following services on the date of the encounter: 35 minutes  Preparing to see patient with chart review    Ordering medications, labs tests  Communicating with other healthcare professionals and care coordination  Interpretation of labs  Performing a medically appropriate examination   Counseling and educating the patient/family/caregiver   Communicating results to the patient/family/caregiver   Documenting clinical information in the electronic health record         YOHAN Dunn CNP

## 2022-04-05 NOTE — PROGRESS NOTES
Pediatric Hematology/Oncology Clinic Note     Lazaro Lund is a 21 year old young man with a history of T-cell lymphoblastic lymphoma. Lazaro presented with acute onset cough and was found to have an anterior mediastinal mass and malignant left-sided pleural effusion.  A CT guided biopsy was obtained at [facility] and pathology was consistent with T-cell leukemia vs lymphoma.  He was admitted to [facility] oncology service 8/30 and started on treatment per [id number].  He had a pleural effusion that required a chest tube and a pericardial effusion that was not drained. His Induction was complicated by cardiac compression secondary to his mass leading to tamponade, this improved through out his hospital stay.  He also had dysphagia that improved with treatment of his mass, swallow study was normal. His course was complicated by extensive bilateral UE DVTs, and he was successfully treated with anticoagulation. His consolidation course was complicated by the development of severe asparaginase induced necrotizing pancreatitis. He became quite ill with SIRS and associated hypotension, he required pressor support in the ICU. As a result, asparaginase was eliminated from his treatment plan. He was in CR status at end of consolidation. During maintenance, he developed hepatotoxicity so allopurinol and dose reduced 6MP were started for correction of skewed 6-MP metabolism.  Lazaro was treated per [facility] [place] protocol [id number] and completed therapy on 12/23/21, he comes to clinic today for an off therapy visit.    HPI:   Debra has been doing well since his last visit. He has had a good energy level and is excited that he got a new job!  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  Lazaro denies any pain although his old port site does itch at times.  He is eating well, notes his weight is up and hopes to level out around his current weight.    Lazaro has not had any fevers, signs of illness, headaches,  dizziness.  He is eating well. No nausea or vomiting. No constipation.  Eczema has cleared up.    Review of systems:  The 10 point Review of Systems is negative other than noted in the HPI or here.      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 Former daily marajuana smoker, now only uses occasionally    PFMH:   Family History   Problem Relation Age of Onset     No Known Problems Mother      No Known Problems Father      Asthma Brother      Thyroid Cancer Paternal Grandmother      Melanoma Paternal Aunt        Social History:   Social History     Social History Narrative    Lazaro previously lived at home with his dad and step mom in [place] but is now staying with his grandma in [place]. Biological mother is involved in his life. Has 4 step-siblings and 1 biological sibling. 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 Has completed high school. 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  He has been enjoying fishing and video games.        Current Medications:  Current Outpatient Medications   Medication Sig Dispense Refill     polyethylene glycol (MIRALAX) 17 GM/Dose powder Take 17 g (1 capful) by mouth daily as needed for constipation 500 g 1  "    sulfamethoxazole-trimethoprim (BACTRIM DS) 800-160 MG tablet Take 1 tablet by mouth Every Mon, Tues two times daily 16 tablet 1     Vitamin D, Cholecalciferol, 25 MCG (1000 UT) TABS Take 1 tablet (1,000 Units) by mouth daily 30 tablet 3   Reviewed medications with Lazaro, missed doses of bactrim.     Physical Exam:   Temp:  [98.3  F (36.8  C)] 98.3  F (36.8  C)  Pulse:  [87] 87  Resp:  [18] 18  BP: (123)/(70) 123/70  SpO2:  [97 %] 97 %  Wt Readings from Last 4 Encounters:   04/05/22 85.8 kg (189 lb 2.5 oz)   02/22/22 81.1 kg (178 lb 12.7 oz)   01/25/22 77.3 kg (170 lb 6.7 oz)   01/07/22 74.6 kg (164 lb 7.4 oz)     Ht Readings from Last 2 Encounters:   04/05/22 1.83 m (6' 0.05\")   02/22/22 1.834 m (6' 0.21\")     General: Lazaro is alert, interactive and appropriate, well-appearing, in no distress  HEENT: Skull is atrauamatic and normocephalic.  Full head of hair. PERRLA, sclera are non icteric and not injected, EOM are intact. Nares are patent without drainage. Oropharynx clear, no plaques noted. No mucositis. MMM.  Neck:  Supple without lymphadenopathy.   Lymph: There is no cervical, supraclavicular, axillary, lymphadenopathy palpated.  Cardiovascular:  HR is regular, S1, S2 no murmur.  Capillary refill is < 2 seconds.   Respiratory: No cough noted. Respirations are easy. Lungs are clear to auscultation throughout.  No crackles or wheezes.  Gastrointestinal: BS present in all quadrants.  Abdomen is soft and flat.  No pain with palpation. No hepatosplenomegaly or masses are palpated.  Skin: No rash. Old port site is c/d/i, scar is wide.  Neurological:  CN 2-12 grossly intact. Gait is normal.  No issues with balance. Sensation intact in hands and feet.     Musculoskeletal: Good strength and ROM in all extremities.  5/5 strength with dorsiflexion and plantarflexion at ankles, and with  strength.      Labs:   Results for orders placed or performed in visit on 04/05/22   CBC with platelets and differential     " Status: None   Result Value Ref Range    WBC Count 4.1 4.0 - 11.0 10e3/uL    RBC Count 4.56 4.40 - 5.90 10e6/uL    Hemoglobin 14.2 13.3 - 17.7 g/dL    Hematocrit 40.4 40.0 - 53.0 %    MCV 89 78 - 100 fL    MCH 31.1 26.5 - 33.0 pg    MCHC 35.1 31.5 - 36.5 g/dL    RDW 11.1 10.0 - 15.0 %    Platelet Count 222 150 - 450 10e3/uL    % Neutrophils 64 %    % Lymphocytes 19 %    % Monocytes 12 %    % Eosinophils 4 %    % Basophils 1 %    % Immature Granulocytes 0 %    NRBCs per 100 WBC 0 <1 /100    Absolute Neutrophils 2.6 1.6 - 8.3 10e3/uL    Absolute Lymphocytes 0.8 0.8 - 5.3 10e3/uL    Absolute Monocytes 0.5 0.0 - 1.3 10e3/uL    Absolute Eosinophils 0.2 0.0 - 0.7 10e3/uL    Absolute Basophils 0.0 0.0 - 0.2 10e3/uL    Absolute Immature Granulocytes 0.0 <=0.4 10e3/uL    Absolute NRBCs 0.0 10e3/uL   CBC with Platelets & Differential     Status: None    Narrative    The following orders were created for panel order CBC with Platelets & Differential.  Procedure                               Abnormality         Status                     ---------                               -----------         ------                     CBC with platelets and d...[[id number]]                      Final result                 Please view results for these tests on the individual orders.       Assessment:  Lazaro Lund is a 21 year old young man with a history of T cell lymphoblastic lymphoma (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  He was treated per [facility] protocol [id number]. He had a CRu following Induction with a CR at the end of Consolidation. His course was complicated by extensive bilateral DVT and severe necrotizing pancreatitis requiring cessation of PEG-asparaginase from his treatment.  He completed therapy on 12/23/21 and comes to clinic today for an off therapy visit.      Plan:   1) Labs reviewed with Lazaro, all cell lines look good.  2) Vaccine titers reviewed today .Lazaro is still immune to: HIB, tetanus, Hep A, Rubeola, Mumps.  He is NOT  immune to: polio, diptheria, rubella, pneumococcus, Hep B or varicella. Also recommend HPV vaccine since he never received this series.  He can receive killed vaccines starting in 5/2022, should wait on live vaccines until he is 1 year off therapy (12/2022).   3) Recommend trial of hydrocortisone to scar to see if this helps with itching.  4) OK to stop Bactrim since he is 3 months off therapy.  5) End of therapy echo done on 1/7/22.  Total anthracycline dose= 175mg/m2, he will need echos every 5 years (2027)  6) Continue to recommend that he take Vit D 1000 IU daily.   7) Discussed fertility at the past visit. Lazaro did sperm bank but elected not to keep the specimen. He should assume that he is fertile, offered repeat semen analysis in the future if he is interested.  Would ideally wait until he is 1 year off therapy.  8) RTC in 1 month for follow up visit and labs.    Félix Van CNP    Total time spent on the following services on the date of the encounter: 35 minutes  Preparing to see patient with chart review    Ordering medications, labs tests  Communicating with other healthcare professionals and care coordination  Interpretation of labs  Performing a medically appropriate examination   Counseling and educating the patient/family/caregiver   Communicating results to the patient/family/caregiver   Documenting clinical information in the electronic health record

## 2022-04-05 NOTE — NURSING NOTE
"Chief Complaint   Patient presents with     RECHECK     Patient is here for T lymphoblastic lymphoma follow up     /70 (BP Location: Right arm, Patient Position: Sitting, Cuff Size: Adult Regular)   Pulse 87   Temp 98.3  F (36.8  C) (Oral)   Resp 18   Ht 1.83 m (6' 0.05\")   Wt 85.8 kg (189 lb 2.5 oz)   SpO2 97%   BMI 25.62 kg/m      No Pain (0)  Data Unavailable    I have reviewed the patients medications and allergies    Height/weight double check needed? No    Peds Outpatient BP  1) Rested for 5 minutes, BP taken on bare arm, patient sitting (or supine for infants) w/ legs uncrossed?   Yes  2) Right arm used?  Right arm   Yes  3) Arm circumference of largest part of upper arm (in cm): 31cm  4) BP cuff sized used: Adult (25-32cm)   If used different size cuff then what was recommended why? N/A  5) First BP reading:machine   BP Readings from Last 1 Encounters:   04/05/22 123/70      Is reading >90%?No   (90% for <1 years is 90/50)  (90% for >18 years is 140/90)  *If a machine BP is at or above 90% take manual BP  6) Manual BP reading: N/A  7) Other comments: None          Andrews Chavez, EMT  April 5, 2022  "

## 2022-05-17 ENCOUNTER — OFFICE VISIT (OUTPATIENT)
Dept: PEDIATRIC HEMATOLOGY/ONCOLOGY | Facility: CLINIC | Age: 22
End: 2022-05-17
Attending: NURSE PRACTITIONER
Payer: COMMERCIAL

## 2022-05-17 VITALS
BODY MASS INDEX: 24.63 KG/M2 | HEART RATE: 78 BPM | TEMPERATURE: 98.3 F | SYSTOLIC BLOOD PRESSURE: 113 MMHG | RESPIRATION RATE: 18 BRPM | DIASTOLIC BLOOD PRESSURE: 72 MMHG | HEIGHT: 72 IN | OXYGEN SATURATION: 98 % | WEIGHT: 181.88 LBS

## 2022-05-17 DIAGNOSIS — C83.50 T LYMPHOBLASTIC LYMPHOMA (H): ICD-10-CM

## 2022-05-17 LAB
BASOPHILS # BLD AUTO: 0 10E3/UL (ref 0–0.2)
BASOPHILS NFR BLD AUTO: 1 %
EOSINOPHIL # BLD AUTO: 0.2 10E3/UL (ref 0–0.7)
EOSINOPHIL NFR BLD AUTO: 3 %
ERYTHROCYTE [DISTWIDTH] IN BLOOD BY AUTOMATED COUNT: 11.3 % (ref 10–15)
HCT VFR BLD AUTO: 41.6 % (ref 40–53)
HGB BLD-MCNC: 14.6 G/DL (ref 13.3–17.7)
IMM GRANULOCYTES # BLD: 0 10E3/UL
IMM GRANULOCYTES NFR BLD: 0 %
LYMPHOCYTES # BLD AUTO: 1.2 10E3/UL (ref 0.8–5.3)
LYMPHOCYTES NFR BLD AUTO: 20 %
MCH RBC QN AUTO: 30.2 PG (ref 26.5–33)
MCHC RBC AUTO-ENTMCNC: 35.1 G/DL (ref 31.5–36.5)
MCV RBC AUTO: 86 FL (ref 78–100)
MONOCYTES # BLD AUTO: 0.5 10E3/UL (ref 0–1.3)
MONOCYTES NFR BLD AUTO: 8 %
NEUTROPHILS # BLD AUTO: 4.1 10E3/UL (ref 1.6–8.3)
NEUTROPHILS NFR BLD AUTO: 68 %
NRBC # BLD AUTO: 0 10E3/UL
NRBC BLD AUTO-RTO: 0 /100
PLATELET # BLD AUTO: 220 10E3/UL (ref 150–450)
RBC # BLD AUTO: 4.83 10E6/UL (ref 4.4–5.9)
WBC # BLD AUTO: 6.1 10E3/UL (ref 4–11)

## 2022-05-17 PROCEDURE — 85025 COMPLETE CBC W/AUTO DIFF WBC: CPT | Performed by: NURSE PRACTITIONER

## 2022-05-17 PROCEDURE — 99214 OFFICE O/P EST MOD 30 MIN: CPT | Performed by: NURSE PRACTITIONER

## 2022-05-17 PROCEDURE — 36415 COLL VENOUS BLD VENIPUNCTURE: CPT | Performed by: NURSE PRACTITIONER

## 2022-05-17 PROCEDURE — G0463 HOSPITAL OUTPT CLINIC VISIT: HCPCS

## 2022-05-17 ASSESSMENT — PAIN SCALES - GENERAL: PAINLEVEL: NO PAIN (0)

## 2022-05-17 NOTE — NURSING NOTE
"Chief Complaint   Patient presents with     RECHECK     Pt here for T lymphoblastic lymphoma     /72 (BP Location: Left arm, Patient Position: Sitting, Cuff Size: Adult Large)   Pulse 78   Temp 98.3  F (36.8  C) (Oral)   Resp 18   Ht 1.83 m (6' 0.05\")   Wt 82.5 kg (181 lb 14.1 oz)   SpO2 98%   BMI 24.64 kg/m      No Pain (0)  Data Unavailable    I have reviewed the patients medications and allergies    Height/weight double check needed? No    Peds Outpatient BP  1) Rested for 5 minutes, BP taken on bare arm, patient sitting (or supine for infants) w/ legs uncrossed?   Yes  2) Right arm used?  Left arm   Yes  3) Arm circumference of largest part of upper arm (in cm): 33cm  4) BP cuff sized used: Large Adult (32-43cm)   If used different size cuff then what was recommended why? N/A  5) First BP reading:machine   BP Readings from Last 1 Encounters:   05/17/22 113/72      Is reading >90%?No   (90% for <1 years is 90/50)  (90% for >18 years is 140/90)  *If a machine BP is at or above 90% take manual BP  6) Manual BP reading: N/A  7) Other comments: None          Andrews Chavez, EMT  May 17, 2022  "

## 2022-05-17 NOTE — PROGRESS NOTES
Pediatric Hematology/Oncology Clinic Note     Lazaro Lund is a 21 year old young man with a history of T-cell lymphoblastic lymphoma. Lazaro presented with acute onset cough and was found to have an anterior mediastinal mass and malignant left-sided pleural effusion.  A CT guided biopsy was obtained at [facility] and pathology was consistent with T-cell leukemia vs lymphoma.  He was admitted to [facility] oncology service 8/30 and started on treatment per [id number].  He had a pleural effusion that required a chest tube and a pericardial effusion that was not drained. His Induction was complicated by cardiac compression secondary to his mass leading to tamponade, this improved through out his hospital stay.  He also had dysphagia that improved with treatment of his mass, swallow study was normal. His course was complicated by extensive bilateral UE DVTs, and he was successfully treated with anticoagulation. His consolidation course was complicated by the development of severe asparaginase induced necrotizing pancreatitis. He became quite ill with SIRS and associated hypotension, he required pressor support in the ICU. As a result, asparaginase was eliminated from his treatment plan. He was in CR status at end of consolidation. During maintenance, he developed hepatotoxicity so allopurinol and dose reduced 6MP were started for correction of skewed 6-MP metabolism.  Lazaro was treated per [facility] protocol [id number] and completed therapy on 12/23/21, he comes to clinic today for an off therapy visit.    HPI:   Debra has been doing well since his last visit. He has had a good energy level. 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  Lazaro denies any pain, port itching has improved.  He wonders what s/sx he would expect if he were to relapse.  He is eating well, weight is closer to his baseline and he feels is at a healthy level now.      Lazaro has not had any fevers,  signs of illness, headaches, dizziness.  He is eating well. No nausea or vomiting. No constipation.  Sleeping well at night, mood is great.    Review of systems:  The 10 point Review of Systems is negative other than noted in the HPI or here.      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  He recently traveled to [place] with his buddies and had a great time.    Current Medications:  Current Outpatient Medications   Medication Sig Dispense Refill     polyethylene glycol (MIRALAX) 17 GM/Dose powder Take 17 g (1 capful) by mouth daily as needed for constipation 500 g 1     Vitamin D, Cholecalciferol, 25 MCG (1000 UT) TABS Take 1 tablet (1,000 Units) by mouth daily 30 tablet 3   Reviewed medications with Lazaro, missed doses of bactrim.     Physical Exam:   Temp:  [98.3  F (36.8  C)] 98.3  F (36.8  C)  Pulse:  [78] 78  Resp:  [18] 18  BP: (113)/(72) 113/72  SpO2:  [98 %] 98 %  Wt Readings from Last 4 Encounters:   05/17/22 82.5  "kg (181 lb 14.1 oz)   04/05/22 85.8 kg (189 lb 2.5 oz)   02/22/22 81.1 kg (178 lb 12.7 oz)   01/25/22 77.3 kg (170 lb 6.7 oz)     Ht Readings from Last 2 Encounters:   05/17/22 1.83 m (6' 0.05\")   04/05/22 1.83 m (6' 0.05\")     General: Lazaro is alert, interactive and appropriate, well-appearing, in no distress  HEENT: Skull is atrauamatic and normocephalic.  Full head of hair. PERRLA, sclera are non icteric and not injected, EOM are intact. Nares are patent without drainage. Oropharynx clear, no plaques noted. No mucositis. MMM.  Neck:  Supple without lymphadenopathy.   Lymph: There is no cervical, supraclavicular, axillary, lymphadenopathy palpated.  Cardiovascular:  HR is regular, S1, S2 no murmur.  Capillary refill is < 2 seconds.   Respiratory: No cough noted. Respirations are easy. Lungs are clear to auscultation throughout.  No crackles or wheezes.  Gastrointestinal: BS present in all quadrants.  Abdomen is soft and flat.  No pain with palpation. No hepatosplenomegaly or masses are palpated.  Skin: No rash. Old port site is c/d/i, scar is wide.  Neurological:  CN 2-12 grossly intact. Gait is normal.  No issues with balance. Sensation intact in hands and feet.     Musculoskeletal: Good strength and ROM in all extremities.  5/5 strength with dorsiflexion and plantarflexion at ankles, and with  strength.      Labs:   Results for orders placed or performed in visit on 05/17/22   CBC with platelets and differential     Status: None   Result Value Ref Range    WBC Count 6.1 4.0 - 11.0 10e3/uL    RBC Count 4.83 4.40 - 5.90 10e6/uL    Hemoglobin 14.6 13.3 - 17.7 g/dL    Hematocrit 41.6 40.0 - 53.0 %    MCV 86 78 - 100 fL    MCH 30.2 26.5 - 33.0 pg    MCHC 35.1 31.5 - 36.5 g/dL    RDW 11.3 10.0 - 15.0 %    Platelet Count 220 150 - 450 10e3/uL    % Neutrophils 68 %    % Lymphocytes 20 %    % Monocytes 8 %    % Eosinophils 3 %    % Basophils 1 %    % Immature Granulocytes 0 %    NRBCs per 100 WBC 0 <1 /100    " Absolute Neutrophils 4.1 1.6 - 8.3 10e3/uL    Absolute Lymphocytes 1.2 0.8 - 5.3 10e3/uL    Absolute Monocytes 0.5 0.0 - 1.3 10e3/uL    Absolute Eosinophils 0.2 0.0 - 0.7 10e3/uL    Absolute Basophils 0.0 0.0 - 0.2 10e3/uL    Absolute Immature Granulocytes 0.0 <=0.4 10e3/uL    Absolute NRBCs 0.0 10e3/uL   CBC with Platelets & Differential     Status: None    Narrative    The following orders were created for panel order CBC with Platelets & Differential.  Procedure                               Abnormality         Status                     ---------                               -----------         ------                     CBC with platelets and d...[[id number]]                      Final result                 Please view results for these tests on the individual orders.     Assessment:  Lazaro Lund is a 21 year old young man with a history of T cell lymphoblastic lymphoma (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  He was treated per [facility] protocol [id number]. He had a CRu following Induction with a CR at the end of Consolidation. His course was complicated by extensive bilateral DVT and severe necrotizing pancreatitis requiring cessation of PEG-asparaginase from his treatment.  He completed therapy on 12/23/21 and comes to clinic today for an off therapy visit.  He is doing very well overall.    Plan:   1) Labs reviewed with Lazaro, all cell lines look good.  2) Previously reviewed vaccine titers. Lazaro is still immune to: HIB, tetanus, Hep A, Rubeola, Mumps.  He is NOT immune to: polio, diptheria, rubella, pneumococcus, Hep B or varicella. Also recommend HPV vaccine since he never received this series.  He can receive killed vaccines at this time, should wait on live vaccines until he is 1 year off therapy (12/2022).   3) Discussed what s/sx to monitor for that may indicate relapse including palpable lumps/masses, difficulty breathing, night sweats, fatigue, pallor.  4) End of therapy echo done on 1/7/22.  Total anthracycline  dose= 175mg/m2, he will need echos every 5 years (2027)  5) Continue to recommend that he take Vit D 1000 IU daily.   6) Discussed fertility previously. Lazaro did sperm bank but elected not to keep the specimen. He should assume that he is fertile, offered repeat semen analysis in the future if he is interested.  Would ideally wait until he is 1 year off therapy.  7) RTC in 1 month for follow up visit and labs.    Félix Van CNP    Total time spent on the following services on the date of the encounter: 38 minutes  Preparing to see patient with chart review    Ordering medications, labs tests  Communicating with other healthcare professionals and care coordination  Interpretation of labs  Performing a medically appropriate examination   Counseling and educating the patient/family/caregiver   Communicating results to the patient/family/caregiver   Documenting clinical information in the electronic health record

## 2022-05-17 NOTE — LETTER
5/17/2022      RE: Lazaro Lund  [address] [facility] [address]     Dear Colleague,    Thank you for the opportunity to participate in the care of your patient, Lazaro Lund, at the [facility] at [facility]. Please see a copy of my visit note below.    Pediatric Hematology/Oncology Clinic Note     Lazaro Lund is a 21 year old young man with a history of T-cell lymphoblastic lymphoma. Lazaro presented with acute onset cough and was found to have an anterior mediastinal mass and malignant left-sided pleural effusion.  A CT guided biopsy was obtained at [facility] and pathology was consistent with T-cell leukemia vs lymphoma.  He was admitted to [facility] oncology service 8/30 and started on treatment per [id number].  He had a pleural effusion that required a chest tube and a pericardial effusion that was not drained. His Induction was complicated by cardiac compression secondary to his mass leading to tamponade, this improved through out his hospital stay.  He also had dysphagia that improved with treatment of his mass, swallow study was normal. His course was complicated by extensive bilateral UE DVTs, and he was successfully treated with anticoagulation. His consolidation course was complicated by the development of severe asparaginase induced necrotizing pancreatitis. He became quite ill with SIRS and associated hypotension, he required pressor support in the ICU. As a result, asparaginase was eliminated from his treatment plan. He was in CR status at end of consolidation. During maintenance, he developed hepatotoxicity so allopurinol and dose reduced 6MP were started for correction of skewed 6-MP metabolism.  Lazaro was treated per [facility] protocol [id number] and completed therapy on 12/23/21, he comes to clinic today for an off therapy visit.    HPI:   Lazaro's has been doing well since his last visit. He has had  a good energy level. 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  Lazaro denies any pain, port itching has improved.  He wonders what s/sx he would expect if he were to relapse.  He is eating well, weight is closer to his baseline and he feels is at a healthy level now.      Lazaro has not had any fevers, signs of illness, headaches, dizziness.  He is eating well. No nausea or vomiting. No constipation.  Sleeping well at night, mood is great.    Review of systems:  The 10 point Review of Systems is negative other than noted in the HPI or here.      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  He recently traveled to [place] with his buddies and had a great time.    Current Medications:  Current Outpatient Medications   Medication Sig Dispense Refill     polyethylene glycol (MIRALAX) 17 GM/Dose powder Take 17 g (1 capful) by mouth daily as needed for constipation 500 g 1      "Vitamin D, Cholecalciferol, 25 MCG (1000 UT) TABS Take 1 tablet (1,000 Units) by mouth daily 30 tablet 3   Reviewed medications with Lazaro, missed doses of bactrim.     Physical Exam:   Temp:  [98.3  F (36.8  C)] 98.3  F (36.8  C)  Pulse:  [78] 78  Resp:  [18] 18  BP: (113)/(72) 113/72  SpO2:  [98 %] 98 %  Wt Readings from Last 4 Encounters:   05/17/22 82.5 kg (181 lb 14.1 oz)   04/05/22 85.8 kg (189 lb 2.5 oz)   02/22/22 81.1 kg (178 lb 12.7 oz)   01/25/22 77.3 kg (170 lb 6.7 oz)     Ht Readings from Last 2 Encounters:   05/17/22 1.83 m (6' 0.05\")   04/05/22 1.83 m (6' 0.05\")     General: Lazaro is alert, interactive and appropriate, well-appearing, in no distress  HEENT: Skull is atrauamatic and normocephalic.  Full head of hair. PERRLA, sclera are non icteric and not injected, EOM are intact. Nares are patent without drainage. Oropharynx clear, no plaques noted. No mucositis. MMM.  Neck:  Supple without lymphadenopathy.   Lymph: There is no cervical, supraclavicular, axillary, lymphadenopathy palpated.  Cardiovascular:  HR is regular, S1, S2 no murmur.  Capillary refill is < 2 seconds.   Respiratory: No cough noted. Respirations are easy. Lungs are clear to auscultation throughout.  No crackles or wheezes.  Gastrointestinal: BS present in all quadrants.  Abdomen is soft and flat.  No pain with palpation. No hepatosplenomegaly or masses are palpated.  Skin: No rash. Old port site is c/d/i, scar is wide.  Neurological:  CN 2-12 grossly intact. Gait is normal.  No issues with balance. Sensation intact in hands and feet.     Musculoskeletal: Good strength and ROM in all extremities.  5/5 strength with dorsiflexion and plantarflexion at ankles, and with  strength.      Labs:   Results for orders placed or performed in visit on 05/17/22   CBC with platelets and differential     Status: None   Result Value Ref Range    WBC Count 6.1 4.0 - 11.0 10e3/uL    RBC Count 4.83 4.40 - 5.90 10e6/uL    Hemoglobin 14.6 13.3 - " 17.7 g/dL    Hematocrit 41.6 40.0 - 53.0 %    MCV 86 78 - 100 fL    MCH 30.2 26.5 - 33.0 pg    MCHC 35.1 31.5 - 36.5 g/dL    RDW 11.3 10.0 - 15.0 %    Platelet Count 220 150 - 450 10e3/uL    % Neutrophils 68 %    % Lymphocytes 20 %    % Monocytes 8 %    % Eosinophils 3 %    % Basophils 1 %    % Immature Granulocytes 0 %    NRBCs per 100 WBC 0 <1 /100    Absolute Neutrophils 4.1 1.6 - 8.3 10e3/uL    Absolute Lymphocytes 1.2 0.8 - 5.3 10e3/uL    Absolute Monocytes 0.5 0.0 - 1.3 10e3/uL    Absolute Eosinophils 0.2 0.0 - 0.7 10e3/uL    Absolute Basophils 0.0 0.0 - 0.2 10e3/uL    Absolute Immature Granulocytes 0.0 <=0.4 10e3/uL    Absolute NRBCs 0.0 10e3/uL   CBC with Platelets & Differential     Status: None    Narrative    The following orders were created for panel order CBC with Platelets & Differential.  Procedure                               Abnormality         Status                     ---------                               -----------         ------                     CBC with platelets and d...[[id number]]                      Final result                 Please view results for these tests on the individual orders.     Assessment:  Lazaro Lund is a 21 year old young man with a history of T cell lymphoblastic lymphoma (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  He was treated per [facility] protocol [id number]. He had a CRu following Induction with a CR at the end of Consolidation. His course was complicated by extensive bilateral DVT and severe necrotizing pancreatitis requiring cessation of PEG-asparaginase from his treatment.  He completed therapy on 12/23/21 and comes to clinic today for an off therapy visit.  He is doing very well overall.    Plan:   1) Labs reviewed with Lazaro, all cell lines look good.  2) Previously reviewed vaccine titers. Lazaro is still immune to: HIB, tetanus, Hep A, Rubeola, Mumps.  He is NOT immune to: polio, diptheria, rubella, pneumococcus, Hep B or varicella. Also recommend HPV vaccine since he  never received this series.  He can receive killed vaccines at this time, should wait on live vaccines until he is 1 year off therapy (12/2022).   3) Discussed what s/sx to monitor for that may indicate relapse including palpable lumps/masses, difficulty breathing, night sweats, fatigue, pallor.  4) End of therapy echo done on 1/7/22.  Total anthracycline dose= 175mg/m2, he will need echos every 5 years (2027)  5) Continue to recommend that he take Vit D 1000 IU daily.   6) Discussed fertility previously. Lazaro did sperm bank but elected not to keep the specimen. He should assume that he is fertile, offered repeat semen analysis in the future if he is interested.  Would ideally wait until he is 1 year off therapy.  7) RTC in 1 month for follow up visit and labs.    Félix Van CNP    Total time spent on the following services on the date of the encounter: 38 minutes  Preparing to see patient with chart review    Ordering medications, labs tests  Communicating with other healthcare professionals and care coordination  Interpretation of labs  Performing a medically appropriate examination   Counseling and educating the patient/family/caregiver   Communicating results to the patient/family/caregiver   Documenting clinical information in the electronic health record         Please do not hesitate to contact me if you have any questions/concerns.     Sincerely,       YHOAN Dunn CNP

## 2022-06-28 ENCOUNTER — OFFICE VISIT (OUTPATIENT)
Dept: PEDIATRIC HEMATOLOGY/ONCOLOGY | Facility: CLINIC | Age: 22
End: 2022-06-28
Attending: NURSE PRACTITIONER
Payer: COMMERCIAL

## 2022-06-28 VITALS
TEMPERATURE: 98.3 F | SYSTOLIC BLOOD PRESSURE: 132 MMHG | HEART RATE: 78 BPM | RESPIRATION RATE: 18 BRPM | OXYGEN SATURATION: 100 % | DIASTOLIC BLOOD PRESSURE: 67 MMHG | HEIGHT: 72 IN | BODY MASS INDEX: 23.68 KG/M2 | WEIGHT: 174.82 LBS

## 2022-06-28 DIAGNOSIS — C83.50 T LYMPHOBLASTIC LYMPHOMA (H): ICD-10-CM

## 2022-06-28 LAB
BASOPHILS # BLD AUTO: 0 10E3/UL (ref 0–0.2)
BASOPHILS NFR BLD AUTO: 0 %
EOSINOPHIL # BLD AUTO: 0.1 10E3/UL (ref 0–0.7)
EOSINOPHIL NFR BLD AUTO: 2 %
ERYTHROCYTE [DISTWIDTH] IN BLOOD BY AUTOMATED COUNT: 12.1 % (ref 10–15)
HCT VFR BLD AUTO: 40.4 % (ref 40–53)
HGB BLD-MCNC: 14.2 G/DL (ref 13.3–17.7)
IMM GRANULOCYTES # BLD: 0 10E3/UL
IMM GRANULOCYTES NFR BLD: 0 %
LYMPHOCYTES # BLD AUTO: 1 10E3/UL (ref 0.8–5.3)
LYMPHOCYTES NFR BLD AUTO: 18 %
MCH RBC QN AUTO: 30.1 PG (ref 26.5–33)
MCHC RBC AUTO-ENTMCNC: 35.1 G/DL (ref 31.5–36.5)
MCV RBC AUTO: 86 FL (ref 78–100)
MONOCYTES # BLD AUTO: 0.4 10E3/UL (ref 0–1.3)
MONOCYTES NFR BLD AUTO: 8 %
NEUTROPHILS # BLD AUTO: 3.8 10E3/UL (ref 1.6–8.3)
NEUTROPHILS NFR BLD AUTO: 72 %
NRBC # BLD AUTO: 0 10E3/UL
NRBC BLD AUTO-RTO: 0 /100
PLATELET # BLD AUTO: 228 10E3/UL (ref 150–450)
RBC # BLD AUTO: 4.71 10E6/UL (ref 4.4–5.9)
WBC # BLD AUTO: 5.3 10E3/UL (ref 4–11)

## 2022-06-28 PROCEDURE — 99215 OFFICE O/P EST HI 40 MIN: CPT | Performed by: NURSE PRACTITIONER

## 2022-06-28 PROCEDURE — 36415 COLL VENOUS BLD VENIPUNCTURE: CPT | Performed by: NURSE PRACTITIONER

## 2022-06-28 PROCEDURE — 85025 COMPLETE CBC W/AUTO DIFF WBC: CPT | Performed by: NURSE PRACTITIONER

## 2022-06-28 PROCEDURE — G0463 HOSPITAL OUTPT CLINIC VISIT: HCPCS

## 2022-06-28 ASSESSMENT — PAIN SCALES - GENERAL: PAINLEVEL: NO PAIN (0)

## 2022-06-28 NOTE — PROGRESS NOTES
Pediatric Hematology/Oncology Clinic Note     Lazaro Lund is a 21 year old young man with a history of T-cell lymphoblastic lymphoma. Lazaro presented with acute onset cough and was found to have an anterior mediastinal mass and malignant left-sided pleural effusion.  A CT guided biopsy was obtained at [facility] and pathology was consistent with T-cell leukemia vs lymphoma.  He was admitted to [facility] oncology service 8/30 and started on treatment per [id number].  He had a pleural effusion that required a chest tube and a pericardial effusion that was not drained. His Induction was complicated by cardiac compression secondary to his mass leading to tamponade, this improved through out his hospital stay.  He also had dysphagia that improved with treatment of his mass, swallow study was normal. His course was complicated by extensive bilateral UE DVTs, and he was successfully treated with anticoagulation. His consolidation course was complicated by the development of severe asparaginase induced necrotizing pancreatitis. He became quite ill with SIRS and associated hypotension, he required pressor support in the ICU. As a result, asparaginase was eliminated from his treatment plan. He was in CR status at end of consolidation. During maintenance, he developed hepatotoxicity so allopurinol and dose reduced 6MP were started for correction of skewed 6-MP metabolism.  Lazaro was treated per [facility] protocol [id number] and completed therapy on 12/23/21, he comes to clinic today for an off therapy visit.    HPI:   Debra has been doing well since his last visit. He has had a good energy level. 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  Lazaro denies any pain.  He is eating well and expresses some concern regarding his unintentional weight loss over the last few months.  He works long hours and often forgets to eat.    Lazaro has not had any fevers, signs of illness,  headaches, dizziness.  He is eating well. No nausea or vomiting. No constipation. Denies any issues with voiding or stooling. Sleeping well at night, mood is great.    Lazaro notes that he has longstanding right sided gynecomastia that is really bothersome to him.  This predated his lymphoma diagnosis.     Review of systems:  The 10 point Review of Systems is negative other than noted in the HPI or here.      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  He recently bought a house with 2 of his friends and is enjoying living with them and having a place of his own.    Current Medications:  No medications at this time.     Physical Exam:   Temp:  [98.3  F (36.8  C)] 98.3  F (36.8  C)  Pulse:  [78] 78  Resp:  [18] 18  BP: (132)/(67) 132/67  SpO2:  [100 %] 100 %  Wt Readings from Last 4 Encounters:   06/28/22 79.3 kg (174 lb 13.2 oz)   05/17/22 82.5 kg (181 lb 14.1 oz)   04/05/22 85.8 kg (189 lb 2.5 oz)   02/22/22 81.1 kg (178 lb  "12.7 oz)     Ht Readings from Last 2 Encounters:   06/28/22 1.83 m (6' 0.05\")   05/17/22 1.83 m (6' 0.05\")     General: Lazaro is alert, interactive and appropriate, well-appearing, in no distress  HEENT: Skull is atrauamatic and normocephalic.  Full head of hair. PERRLA, sclera are non icteric and not injected, EOM are intact. Nares are patent without drainage. Oropharynx clear, no plaques noted. No mucositis. MMM.  Neck:  Supple without lymphadenopathy.   Lymph: There is no cervical, supraclavicular, axillary, lymphadenopathy palpated.  Cardiovascular/chest:  Right sided gynecomastia. HR is regular, S1, S2 no murmur.  Capillary refill is < 2 seconds.   Respiratory: No cough noted. Respirations are easy. Lungs are clear to auscultation throughout.  No crackles or wheezes.  Gastrointestinal: BS present in all quadrants.  Abdomen is soft and flat.  No pain with palpation. No hepatosplenomegaly or masses are palpated.  Skin: No rash. Old port site is c/d/i, scar is wide.  Neurological:  CN 2-12 grossly intact. Gait is normal.  No issues with balance. Sensation intact in hands and feet.     Musculoskeletal: Good strength and ROM in all extremities.  5/5 strength with dorsiflexion and plantarflexion at ankles, and with  strength.      Labs:   Results for orders placed or performed in visit on 06/28/22   CBC with platelets and differential     Status: None   Result Value Ref Range    WBC Count 5.3 4.0 - 11.0 10e3/uL    RBC Count 4.71 4.40 - 5.90 10e6/uL    Hemoglobin 14.2 13.3 - 17.7 g/dL    Hematocrit 40.4 40.0 - 53.0 %    MCV 86 78 - 100 fL    MCH 30.1 26.5 - 33.0 pg    MCHC 35.1 31.5 - 36.5 g/dL    RDW 12.1 10.0 - 15.0 %    Platelet Count 228 150 - 450 10e3/uL    % Neutrophils 72 %    % Lymphocytes 18 %    % Monocytes 8 %    % Eosinophils 2 %    % Basophils 0 %    % Immature Granulocytes 0 %    NRBCs per 100 WBC 0 <1 /100    Absolute Neutrophils 3.8 1.6 - 8.3 10e3/uL    Absolute Lymphocytes 1.0 0.8 - 5.3 10e3/uL "    Absolute Monocytes 0.4 0.0 - 1.3 10e3/uL    Absolute Eosinophils 0.1 0.0 - 0.7 10e3/uL    Absolute Basophils 0.0 0.0 - 0.2 10e3/uL    Absolute Immature Granulocytes 0.0 <=0.4 10e3/uL    Absolute NRBCs 0.0 10e3/uL   CBC with Platelets & Differential     Status: None    Narrative    The following orders were created for panel order CBC with Platelets & Differential.  Procedure                               Abnormality         Status                     ---------                               -----------         ------                     CBC with platelets and d...[[id number]]                      Final result                 Please view results for these tests on the individual orders.     Assessment:  Lazaro Lund is a 21 year old young man with a history of T cell lymphoblastic lymphoma (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  He [place] [facility] [place] [id number]. He had a CRu following Induction with a CR at the end of Consolidation. His course was complicated by extensive bilateral DVT and severe necrotizing pancreatitis requiring cessation of PEG-asparaginase from his treatment.  He completed therapy on 12/23/21 and comes to clinic today for an off therapy visit.  He is doing very well overall.  He has gynecomastia. Unintentional weight loss.     Plan:   1) Labs reviewed with Lazaro, all cell lines look good.  2) Previously reviewed vaccine titers. Lazaro is still immune to: HIB, tetanus, Hep A, Rubeola, Mumps.  He is NOT immune to: polio, diptheria, rubella, pneumococcus, Hep B or varicella. Also recommend HPV vaccine and COVID vaccine since he has not received either.  He can receive killed vaccines at this time, should wait on live vaccines until he is 1 year off therapy (12/2022). We discussed finding a PCP and receiving these vaccinations to best protect himself moving forward, recommend the [facility] which is close to his house.  3) Discussed Lazaro's weight loss over the last few months and analyzed  his trends since diagnosis and treatment. Reassured him that we would continue to follow the trends and that the weight loss seems to coincide with him starting his new job and being more active.  Recommend he bring with high calorie snacks (trail mix etc) to eat throughout the day at work.  4) End of therapy echo done on 1/7/22.  Total anthracycline dose= 175mg/m2, he will need echos every 5 years (2027)  5) Continue to recommend that he take Vit D 1000 IU daily.   6) Discussed fertility previously. Lazaro did sperm bank but elected not to keep the specimen. He should assume that he is fertile, offered repeat semen analysis in the future if he is interested.  Would ideally wait until he is 1 year off therapy.  7) Recommend he have gynecomastia evaluated with primary care when he establishes care.   8) RTC in 1 month for follow up visit and labs.    Scribed by Juan Martinez    The documentation recorded by the scribe accurately reflects the services I personally performed and the decisions made by me.  YOHAN Dunn CNP, CNP    Total time spent on the following services on the date of the encounter: 37 minutes  Preparing to see patient with chart review    Ordering medications, labs tests  Communicating with other healthcare professionals and care coordination  Interpretation of labs  Performing a medically appropriate examination   Counseling and educating the patient/family/caregiver   Communicating results to the patient/family/caregiver   Documenting clinical information in the electronic health record

## 2022-06-28 NOTE — LETTER
6/28/2022      RE: Lazaro Lund  [address] [address] [facility] [id number]     Dear Colleague,    Thank you for the opportunity to participate in the care of your patient, Lazaro Lund, at the [facility] at [facility]. Please see a copy of my visit note below.    Pediatric Hematology/Oncology Clinic Note     Lazaro Lund is a 21 year old young man with a history of T-cell lymphoblastic lymphoma. Lazaro presented with acute onset cough and was found to have an anterior mediastinal mass and malignant left-sided pleural effusion.  A CT guided biopsy was obtained at [facility] and pathology was consistent with T-cell leukemia vs lymphoma.  He was admitted to [facility] oncology service 8/30 and started on treatment per [id number].  He had a pleural effusion that required a chest tube and a pericardial effusion that was not drained. His Induction was complicated by cardiac compression secondary to his mass leading to tamponade, this improved through out his hospital stay.  He also had dysphagia that improved with treatment of his mass, swallow study was normal. His course was complicated by extensive bilateral UE DVTs, and he was successfully treated with anticoagulation. His consolidation course was complicated by the development of severe asparaginase induced necrotizing pancreatitis. He became quite ill with SIRS and associated hypotension, he required pressor support in the ICU. As a result, asparaginase was eliminated from his treatment plan. He was in CR status at end of consolidation. During maintenance, he developed hepatotoxicity so allopurinol and dose reduced 6MP were started for correction of skewed 6-MP metabolism.  Lazaro was treated per [facility] protocol [id number] and completed therapy on 12/23/21, he comes to clinic today for an off therapy visit.    HPI:   Lazaro's has been doing well since his last visit. He has had a  good energy level. 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  Lazaro denies any pain.  He is eating well and expresses some concern regarding his unintentional weight loss over the last few months.  He works long hours and often forgets to eat.    Lazaro has not had any fevers, signs of illness, headaches, dizziness.  He is eating well. No nausea or vomiting. No constipation. Denies any issues with voiding or stooling. Sleeping well at night, mood is great.    Lazaro notes that he has longstanding right sided gynecomastia that is really bothersome to him.  This predated his lymphoma diagnosis.     Review of systems:  The 10 point Review of Systems is negative other than noted in the HPI or here.      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  He recently bought a house with 2 of his friends and is enjoying living with them and having a place of his own.    Current  "Medications:  No medications at this time.     Physical Exam:   Temp:  [98.3  F (36.8  C)] 98.3  F (36.8  C)  Pulse:  [78] 78  Resp:  [18] 18  BP: (132)/(67) 132/67  SpO2:  [100 %] 100 %  Wt Readings from Last 4 Encounters:   06/28/22 79.3 kg (174 lb 13.2 oz)   05/17/22 82.5 kg (181 lb 14.1 oz)   04/05/22 85.8 kg (189 lb 2.5 oz)   02/22/22 81.1 kg (178 lb 12.7 oz)     Ht Readings from Last 2 Encounters:   06/28/22 1.83 m (6' 0.05\")   05/17/22 1.83 m (6' 0.05\")     General: Lazaro is alert, interactive and appropriate, well-appearing, in no distress  HEENT: Skull is atrauamatic and normocephalic.  Full head of hair. PERRLA, sclera are non icteric and not injected, EOM are intact. Nares are patent without drainage. Oropharynx clear, no plaques noted. No mucositis. MMM.  Neck:  Supple without lymphadenopathy.   Lymph: There is no cervical, supraclavicular, axillary, lymphadenopathy palpated.  Cardiovascular/chest:  Right sided gynecomastia. HR is regular, S1, S2 no murmur.  Capillary refill is < 2 seconds.   Respiratory: No cough noted. Respirations are easy. Lungs are clear to auscultation throughout.  No crackles or wheezes.  Gastrointestinal: BS present in all quadrants.  Abdomen is soft and flat.  No pain with palpation. No hepatosplenomegaly or masses are palpated.  Skin: No rash. Old port site is c/d/i, scar is wide.  Neurological:  CN 2-12 grossly intact. Gait is normal.  No issues with balance. Sensation intact in hands and feet.     Musculoskeletal: Good strength and ROM in all extremities.  5/5 strength with dorsiflexion and plantarflexion at ankles, and with  strength.      Labs:   Results for orders placed or performed in visit on 06/28/22   CBC with platelets and differential     Status: None   Result Value Ref Range    WBC Count 5.3 4.0 - 11.0 10e3/uL    RBC Count 4.71 4.40 - 5.90 10e6/uL    Hemoglobin 14.2 13.3 - 17.7 g/dL    Hematocrit 40.4 40.0 - 53.0 %    MCV 86 78 - 100 fL    MCH 30.1 26.5 - 33.0 " pg    MCHC 35.1 31.5 - 36.5 g/dL    RDW 12.1 10.0 - 15.0 %    Platelet Count 228 150 - 450 10e3/uL    % Neutrophils 72 %    % Lymphocytes 18 %    % Monocytes 8 %    % Eosinophils 2 %    % Basophils 0 %    % Immature Granulocytes 0 %    NRBCs per 100 WBC 0 <1 /100    Absolute Neutrophils 3.8 1.6 - 8.3 10e3/uL    Absolute Lymphocytes 1.0 0.8 - 5.3 10e3/uL    Absolute Monocytes 0.4 0.0 - 1.3 10e3/uL    Absolute Eosinophils 0.1 0.0 - 0.7 10e3/uL    Absolute Basophils 0.0 0.0 - 0.2 10e3/uL    Absolute Immature Granulocytes 0.0 <=0.4 10e3/uL    Absolute NRBCs 0.0 10e3/uL   CBC with Platelets & Differential     Status: None    Narrative    The following orders were created for panel order CBC with Platelets & Differential.  Procedure                               Abnormality         Status                     ---------                               -----------         ------                     CBC with platelets and d...[[id number]]                      Final result                 Please view results for these tests on the individual orders.     Assessment:  Lazaro Lund is a 21 year old young man with a history of T cell lymphoblastic lymphoma (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  He was treated per [facility] protocol [id number]. He had a CRu following Induction with a CR at the end of Consolidation. His course was complicated by extensive bilateral DVT and severe necrotizing pancreatitis requiring cessation of PEG-asparaginase from his treatment.  He completed therapy on 12/23/21 and comes to clinic today for an off therapy visit.  He is doing very well overall.  He has gynecomastia. Unintentional weight loss.     Plan:   1) Labs reviewed with Lazaro, all cell lines look good.  2) Previously reviewed vaccine titers. Lazaro is still immune to: HIB, tetanus, Hep A, Rubeola, Mumps.  He is NOT immune to: polio, diptheria, rubella, pneumococcus, Hep B or varicella. Also recommend HPV vaccine and COVID vaccine since he has not received  either.  He can receive killed vaccines at this time, should wait on live vaccines until he is 1 year off therapy (12/2022). We discussed finding a PCP and receiving these vaccinations to best protect himself moving forward, recommend the [facility] which is close to his house.  3) Discussed Lazaro's weight loss over the last few months and analyzed his trends since diagnosis and treatment. Reassured him that we would continue to follow the trends and that the weight loss seems to coincide with him starting his new job and being more active.  Recommend he bring with high calorie snacks (trail mix etc) to eat throughout the day at work.  4) End of therapy echo done on 1/7/22.  Total anthracycline dose= 175mg/m2, he will need echos every 5 years (2027)  5) Continue to recommend that he take Vit D 1000 IU daily.   6) Discussed fertility previously. Lazaro did sperm bank but elected not to keep the specimen. He should assume that he is fertile, offered repeat semen analysis in the future if he is interested.  Would ideally wait until he is 1 year off therapy.  7) Recommend he have gynecomastia evaluated with primary care when he establishes care.   8) RTC in 1 month for follow up visit and labs.    Scribed by Juan Martinez    The documentation recorded by the scribe accurately reflects the services I personally performed and the decisions made by me.  YOHAN Dunn CNP, CNP    Total time spent on the following services on the date of the encounter: 37 minutes  Preparing to see patient with chart review    Ordering medications, labs tests  Communicating with other healthcare professionals and care coordination  Interpretation of labs  Performing a medically appropriate examination   Counseling and educating the patient/family/caregiver   Communicating results to the patient/family/caregiver   Documenting clinical information in the electronic health record       Please do not  hesitate to contact me if you have any questions/concerns.     Sincerely,       YOHAN Dunn CNP

## 2022-06-28 NOTE — NURSING NOTE
"Chief Complaint   Patient presents with     RECHECK     Pt here for T lymphoblastic lymphoma follow-up      /67 (BP Location: Right arm, Patient Position: Sitting, Cuff Size: Adult Regular)   Pulse 78   Temp 98.3  F (36.8  C) (Oral)   Resp 18   Ht 1.83 m (6' 0.05\")   Wt 79.3 kg (174 lb 13.2 oz)   SpO2 100%   BMI 23.68 kg/m      No Pain (0)  Data Unavailable    I have reviewed the patients medications and allergies    Height/weight double check needed? No    Peds Outpatient BP  1) Rested for 5 minutes, BP taken on bare arm, patient sitting (or supine for infants) w/ legs uncrossed?   Yes  2) Right arm used?  Right arm   Yes  3) Arm circumference of largest part of upper arm (in cm): 31cm  4) BP cuff sized used: Adult (25-32cm)   If used different size cuff then what was recommended why? N/A  5) First BP reading:machine   BP Readings from Last 1 Encounters:   06/28/22 132/67      Is reading >90%?No   (90% for <1 years is 90/50)  (90% for >18 years is 140/90)  *If a machine BP is at or above 90% take manual BP  6) Manual BP reading: N/A  7) Other comments: None          Andrews Chavez, EMT  June 28, 2022  "

## 2022-07-11 ENCOUNTER — TELEPHONE (OUTPATIENT)
Dept: PEDIATRIC HEMATOLOGY/ONCOLOGY | Facility: CLINIC | Age: 22
End: 2022-07-11

## 2022-07-11 NOTE — TELEPHONE ENCOUNTER
"Lazaro had reached out to the oncology team 7/5 via [facility] - due to provider being out of office it was followed up on today. Lazaro had originally reached out with concerns about possible swollen lymph nodes that he could feel when he swallowed and wanted to know if he should be concerned. I spoke with Lazaro today and he reports that he is feeling \"completely normal\" and that all issues have resolved. He told me that for a couple days the \"base of his tongue\" hurt when he swallowed and that he felt that under his jaw line there was inflammation -- all of this has now been resolved and he no longer has any concerns. He denied any fever or other symptoms. I spoke with Luana Van NP and no new orders placed at this time. Pt was encouraged to reach out with any future questions or concerns.   "

## 2022-08-05 ENCOUNTER — OFFICE VISIT (OUTPATIENT)
Dept: PEDIATRIC HEMATOLOGY/ONCOLOGY | Facility: CLINIC | Age: 22
End: 2022-08-05
Attending: NURSE PRACTITIONER
Payer: COMMERCIAL

## 2022-08-05 VITALS
OXYGEN SATURATION: 97 % | TEMPERATURE: 98.9 F | WEIGHT: 176.15 LBS | HEART RATE: 87 BPM | RESPIRATION RATE: 18 BRPM | HEIGHT: 72 IN | DIASTOLIC BLOOD PRESSURE: 62 MMHG | SYSTOLIC BLOOD PRESSURE: 118 MMHG | BODY MASS INDEX: 23.86 KG/M2

## 2022-08-05 DIAGNOSIS — C83.50 T LYMPHOBLASTIC LYMPHOMA (H): ICD-10-CM

## 2022-08-05 LAB
BASOPHILS # BLD AUTO: 0 10E3/UL (ref 0–0.2)
BASOPHILS NFR BLD AUTO: 1 %
EOSINOPHIL # BLD AUTO: 0.2 10E3/UL (ref 0–0.7)
EOSINOPHIL NFR BLD AUTO: 3 %
ERYTHROCYTE [DISTWIDTH] IN BLOOD BY AUTOMATED COUNT: 11.9 % (ref 10–15)
HCT VFR BLD AUTO: 42.7 % (ref 40–53)
HGB BLD-MCNC: 15 G/DL (ref 13.3–17.7)
IMM GRANULOCYTES # BLD: 0 10E3/UL
IMM GRANULOCYTES NFR BLD: 0 %
LYMPHOCYTES # BLD AUTO: 1.4 10E3/UL (ref 0.8–5.3)
LYMPHOCYTES NFR BLD AUTO: 23 %
MCH RBC QN AUTO: 29.9 PG (ref 26.5–33)
MCHC RBC AUTO-ENTMCNC: 35.1 G/DL (ref 31.5–36.5)
MCV RBC AUTO: 85 FL (ref 78–100)
MONOCYTES # BLD AUTO: 0.3 10E3/UL (ref 0–1.3)
MONOCYTES NFR BLD AUTO: 6 %
NEUTROPHILS # BLD AUTO: 3.9 10E3/UL (ref 1.6–8.3)
NEUTROPHILS NFR BLD AUTO: 67 %
NRBC # BLD AUTO: 0 10E3/UL
NRBC BLD AUTO-RTO: 0 /100
PLATELET # BLD AUTO: 229 10E3/UL (ref 150–450)
RBC # BLD AUTO: 5.01 10E6/UL (ref 4.4–5.9)
WBC # BLD AUTO: 5.8 10E3/UL (ref 4–11)

## 2022-08-05 PROCEDURE — 99214 OFFICE O/P EST MOD 30 MIN: CPT | Performed by: NURSE PRACTITIONER

## 2022-08-05 PROCEDURE — 36415 COLL VENOUS BLD VENIPUNCTURE: CPT | Performed by: NURSE PRACTITIONER

## 2022-08-05 PROCEDURE — G0463 HOSPITAL OUTPT CLINIC VISIT: HCPCS

## 2022-08-05 PROCEDURE — 85025 COMPLETE CBC W/AUTO DIFF WBC: CPT | Performed by: NURSE PRACTITIONER

## 2022-08-05 ASSESSMENT — PAIN SCALES - GENERAL
PAINLEVEL: NO PAIN (0)
PAINLEVEL: NO PAIN (0)

## 2022-08-05 NOTE — LETTER
8/5/2022      RE: Lazaro Lund  [address] [facility] [id number]     Dear Colleague,    Thank you for the opportunity to participate in the care of your patient, Lazaro Lund, at the [facility] at [facility]. Please see a copy of my visit note below.    Pediatric Hematology/Oncology Clinic Note     Lazaro Lund is a 22 year old young man with a history of T-cell lymphoblastic lymphoma. Lazaro presented with acute onset cough and was found to have an anterior mediastinal mass and malignant left-sided pleural effusion.  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  He was admitted to [facility] oncology service 8/30 and started on treatment per [id number].  He had a pleural effusion that required a chest tube and a pericardial effusion that was not drained. His Induction was complicated by cardiac compression secondary to his mass leading to tamponade, this improved through out his hospital stay.  He also had dysphagia that improved with treatment of his mass, swallow study was normal. His course was complicated by extensive bilateral UE DVTs, and he was successfully treated with anticoagulation. His consolidation course was complicated by the development of severe asparaginase induced necrotizing pancreatitis. He became quite ill with SIRS and associated hypotension, he required pressor support in the ICU. As a result, asparaginase was eliminated from his treatment plan. He was in CR status at end of consolidation. During maintenance, he developed hepatotoxicity so allopurinol and dose reduced 6MP were started for correction of skewed 6-MP metabolism.  Lazaro was treated per [facility] protocol [id number] and completed therapy on 12/23/21, he comes to clinic today for an off therapy visit.    HPI:   Lazaro's has been doing well since his last visit. He has had a  good energy level.  He is eating well, no GI upset.  No new skin concerns.  No pain.  He had a mild illness a few weeks ago and was concerned he had some palpable neck nodes however this resolved quickly when his respiratory symptoms improved.  No fevers.    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  He is doing a better job with eating routinely.    Lazaro has not had any headaches or dizziness. Sleeping well at night, mood is great.      Review of systems:  The 10 point Review of Systems is negative other than noted in the HPI or here.      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 Former daily marajuana smoker, now only uses occasionally  --Long standing right sided gynecomastia that predated his lymphoma diagnosis.    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  He recently bought a house with 2 of his friends and is enjoying living with them and having a place of his own.  He has a friend visiting from [place] next  "week.    Current Medications:  No medications at this time.     Physical Exam:   Temp:  [98.9  F (37.2  C)] 98.9  F (37.2  C)  Pulse:  [87] 87  Resp:  [18] 18  BP: (118-134)/(62-72) 118/62  SpO2:  [97 %] 97 %  Wt Readings from Last 4 Encounters:   08/05/22 79.9 kg (176 lb 2.4 oz)   06/28/22 79.3 kg (174 lb 13.2 oz)   05/17/22 82.5 kg (181 lb 14.1 oz)   04/05/22 85.8 kg (189 lb 2.5 oz)     Ht Readings from Last 2 Encounters:   08/05/22 1.832 m (6' 0.13\")   06/28/22 1.83 m (6' 0.05\")     General: Lazaro is alert, interactive and appropriate, well-appearing, in no distress  HEENT: Skull is atrauamatic and normocephalic.  Full head of hair. PERRLA, sclera are non icteric and not injected, EOM are intact. Nares are patent without drainage. Oropharynx clear, no plaques noted. No mucositis. MMM.  Neck:  Supple without lymphadenopathy.   Lymph: There is no cervical, supraclavicular, axillary, lymphadenopathy palpated.  Cardiovascular/chest:  Right sided gynecomastia. HR is regular, S1, S2 no murmur.  Capillary refill is < 2 seconds.   Respiratory: No cough noted. Respirations are easy. Lungs are clear to auscultation throughout.  No crackles or wheezes.  Gastrointestinal: BS present in all quadrants.  Abdomen is soft and flat.  No pain with palpation. No hepatosplenomegaly or masses are palpated.  Skin: No rash. Old port site is c/d/i, scar is wide.  Neurological:  CN 2-12 grossly intact. Gait is normal.  No issues with balance. Sensation intact in hands and feet.     Musculoskeletal: Good strength and ROM in all extremities.  5/5 strength with dorsiflexion and plantarflexion at ankles, and with  strength.      Labs:   Results for orders placed or performed in visit on 08/05/22   CBC with platelets and differential     Status: None   Result Value Ref Range    WBC Count 5.8 4.0 - 11.0 10e3/uL    RBC Count 5.01 4.40 - 5.90 10e6/uL    Hemoglobin 15.0 13.3 - 17.7 g/dL    Hematocrit 42.7 40.0 - 53.0 %    MCV 85 78 - 100 fL "    MCH 29.9 26.5 - 33.0 pg    MCHC 35.1 31.5 - 36.5 g/dL    RDW 11.9 10.0 - 15.0 %    Platelet Count 229 150 - 450 10e3/uL    % Neutrophils 67 %    % Lymphocytes 23 %    % Monocytes 6 %    % Eosinophils 3 %    % Basophils 1 %    % Immature Granulocytes 0 %    NRBCs per 100 WBC 0 <1 /100    Absolute Neutrophils 3.9 1.6 - 8.3 10e3/uL    Absolute Lymphocytes 1.4 0.8 - 5.3 10e3/uL    Absolute Monocytes 0.3 0.0 - 1.3 10e3/uL    Absolute Eosinophils 0.2 0.0 - 0.7 10e3/uL    Absolute Basophils 0.0 0.0 - 0.2 10e3/uL    Absolute Immature Granulocytes 0.0 <=0.4 10e3/uL    Absolute NRBCs 0.0 10e3/uL   CBC with Platelets & Differential     Status: None    Narrative    The following orders were created for panel order CBC with Platelets & Differential.  Procedure                               Abnormality         Status                     ---------                               -----------         ------                     CBC with platelets and d...[[id number]]                      Final result                 Please view results for these tests on the individual orders.     Assessment:  Lazaro Lund is a 22 year old young man with a history of T cell lymphoblastic lymphoma (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  He was treated per [facility] protocol [id number]. He had a CRu following Induction with a CR at the end of Consolidation. His course was complicated by extensive bilateral DVT and severe necrotizing pancreatitis requiring cessation of PEG-asparaginase from his treatment.  He completed therapy on 12/23/21 and comes to clinic today for an off therapy visit.  He is doing very well overall.  He has gynecomastia. Weight loss has stabilized.     Plan:   1) Labs reviewed with Lazaro, all cell lines look good.  2) Previously reviewed vaccine titers. Lazaro is still immune to: HIB, tetanus, Hep A, Rubeola, Mumps.  He is NOT immune to: polio, diptheria, rubella, pneumococcus, Hep B or varicella. Also recommend HPV vaccine and COVID vaccine since  he has not received either.  He can receive killed vaccines at this time, should wait on live vaccines until he is 1 year off therapy (12/2022). We discussed finding a PCP and receiving these vaccinations to best protect himself moving forward, recommend the [facility] which is close to his house.  3) Continue to monitor weight and encourage high calorie snacks (trail mix etc) to eat throughout the day at work.  4) End of therapy echo done on 1/7/22.  Total anthracycline dose= 175mg/m2, he will need echos every 5 years (2027)  5) Continue to recommend that he take Vit D 1000 IU daily.   6) Discussed fertility previously. Lazaro did sperm bank but elected not to keep the specimen. He should assume that he is fertile, offered repeat semen analysis in the future if he is interested.  Would ideally wait until he is 1 year off therapy.  7) Recommend he have gynecomastia evaluated with primary care when he establishes care.  8) RTC in 1 month for follow up visit and labs.    Félix Van CNP    Total time spent on the following services on the date of the encounter: 35 minutes  Preparing to see patient with chart review    Ordering medications, labs tests  Communicating with other healthcare professionals and care coordination  Interpretation of labs  Performing a medically appropriate examination   Counseling and educating the patient/family/caregiver   Communicating results to the patient/family/caregiver   Documenting clinical information in the electronic health record

## 2022-08-05 NOTE — PROGRESS NOTES
Pediatric Hematology/Oncology Clinic Note     Lazaro Lund is a 22 year old young man with a history of T-cell lymphoblastic lymphoma. Lazaro presented with acute onset cough and was found to have an anterior mediastinal mass and malignant left-sided pleural effusion.  A CT guided biopsy was obtained at [facility] and pathology was consistent with T-cell leukemia vs lymphoma.  He was admitted to [facility] oncology service 8/30 and started on treatment per [id number].  He had a pleural effusion that required a chest tube and a pericardial effusion that was not drained. His Induction was complicated by cardiac compression secondary to his mass leading to tamponade, this improved through out his hospital stay.  He also had dysphagia that improved with treatment of his mass, swallow study was normal. His course was complicated by extensive bilateral UE DVTs, and he was successfully treated with anticoagulation. His consolidation course was complicated by the development of severe asparaginase induced necrotizing pancreatitis. He became quite ill with SIRS and associated hypotension, he required pressor support in the ICU. As a result, asparaginase was eliminated from his treatment plan. He was in CR status at end of consolidation. During maintenance, he developed hepatotoxicity so allopurinol and dose reduced 6MP were started for correction of skewed 6-MP metabolism.  Lazaro was treated per [facility] protocol [id number] and completed therapy on 12/23/21, he comes to clinic today for an off therapy visit.    HPI:   Debra has been doing well since his last visit. He has had a good energy level.  He is eating well, no GI upset.  No new skin concerns.  No pain.  He had a mild illness a few weeks ago and was concerned he had some palpable neck nodes however this resolved quickly when his respiratory symptoms improved.  No fevers.    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  He is doing a better job with eating routinely.    Lazaro has not had any headaches or dizziness. Sleeping well at night, mood is great.      Review of systems:  The 10 point Review of Systems is negative other than noted in the HPI or here.      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 Former daily marajuana smoker, now only uses occasionally  --Long standing right sided gynecomastia that predated his lymphoma diagnosis.    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  He recently bought a house with 2 of his friends and is enjoying living with them and having a place of his own.  He has a friend visiting from [place] next week.    Current Medications:  No medications at this time.     Physical Exam:   Temp:  [98.9  F (37.2  C)] 98.9  F (37.2  C)  Pulse:  [87] 87  Resp:  [18] 18  BP: (118-134)/(62-72) 118/62  SpO2:  [97 %] 97 %  Wt Readings from Last 4 Encounters:   08/05/22 79.9 kg (176 lb 2.4 oz)   06/28/22 79.3 kg (174 lb 13.2 oz)   05/17/22 82.5 kg (181 lb 14.1 oz)   04/05/22 85.8 kg  "(189 lb 2.5 oz)     Ht Readings from Last 2 Encounters:   08/05/22 1.832 m (6' 0.13\")   06/28/22 1.83 m (6' 0.05\")     General: Lazaro is alert, interactive and appropriate, well-appearing, in no distress  HEENT: Skull is atrauamatic and normocephalic.  Full head of hair. PERRLA, sclera are non icteric and not injected, EOM are intact. Nares are patent without drainage. Oropharynx clear, no plaques noted. No mucositis. MMM.  Neck:  Supple without lymphadenopathy.   Lymph: There is no cervical, supraclavicular, axillary, lymphadenopathy palpated.  Cardiovascular/chest:  Right sided gynecomastia. HR is regular, S1, S2 no murmur.  Capillary refill is < 2 seconds.   Respiratory: No cough noted. Respirations are easy. Lungs are clear to auscultation throughout.  No crackles or wheezes.  Gastrointestinal: BS present in all quadrants.  Abdomen is soft and flat.  No pain with palpation. No hepatosplenomegaly or masses are palpated.  Skin: No rash. Old port site is c/d/i, scar is wide.  Neurological:  CN 2-12 grossly intact. Gait is normal.  No issues with balance. Sensation intact in hands and feet.     Musculoskeletal: Good strength and ROM in all extremities.  5/5 strength with dorsiflexion and plantarflexion at ankles, and with  strength.      Labs:   Results for orders placed or performed in visit on 08/05/22   CBC with platelets and differential     Status: None   Result Value Ref Range    WBC Count 5.8 4.0 - 11.0 10e3/uL    RBC Count 5.01 4.40 - 5.90 10e6/uL    Hemoglobin 15.0 13.3 - 17.7 g/dL    Hematocrit 42.7 40.0 - 53.0 %    MCV 85 78 - 100 fL    MCH 29.9 26.5 - 33.0 pg    MCHC 35.1 31.5 - 36.5 g/dL    RDW 11.9 10.0 - 15.0 %    Platelet Count 229 150 - 450 10e3/uL    % Neutrophils 67 %    % Lymphocytes 23 %    % Monocytes 6 %    % Eosinophils 3 %    % Basophils 1 %    % Immature Granulocytes 0 %    NRBCs per 100 WBC 0 <1 /100    Absolute Neutrophils 3.9 1.6 - 8.3 10e3/uL    Absolute Lymphocytes 1.4 0.8 - 5.3 " 10e3/uL    Absolute Monocytes 0.3 0.0 - 1.3 10e3/uL    Absolute Eosinophils 0.2 0.0 - 0.7 10e3/uL    Absolute Basophils 0.0 0.0 - 0.2 10e3/uL    Absolute Immature Granulocytes 0.0 <=0.4 10e3/uL    Absolute NRBCs 0.0 10e3/uL   CBC with Platelets & Differential     Status: None    Narrative    The following orders were created for panel order CBC with Platelets & Differential.  Procedure                               Abnormality         Status                     ---------                               -----------         ------                     CBC with platelets and d...[[id number]]                      Final result                 Please view results for these tests on the individual orders.     Assessment:  Lazaro Lund is a 22 year old young man with a history of T cell lymphoblastic lymphoma (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  He was treated per [facility] protocol [id number]. He had a CRu following Induction with a CR at the end of Consolidation. His course was complicated by extensive bilateral DVT and severe necrotizing pancreatitis requiring cessation of PEG-asparaginase from his treatment.  He completed therapy on 12/23/21 and comes to clinic today for an off therapy visit.  He is doing very well overall.  He has gynecomastia. Weight loss has stabilized.     Plan:   1) Labs reviewed with Lazaro, all cell lines look good.  2) Previously reviewed vaccine titers. Lazaro is still immune to: HIB, tetanus, Hep A, Rubeola, Mumps.  He is NOT immune to: polio, diptheria, rubella, pneumococcus, Hep B or varicella. Also recommend HPV vaccine and COVID vaccine since he has not received either.  He can receive killed vaccines at this time, should wait on live vaccines until he is 1 year off therapy (12/2022). We discussed finding a PCP and receiving these vaccinations to best protect himself moving forward, recommend the [facility] which is close to his house.  3) Continue to monitor weight and encourage high calorie snacks  (trail mix etc) to eat throughout the day at work.  4) End of therapy echo done on 1/7/22.  Total anthracycline dose= 175mg/m2, he will need echos every 5 years (2027)  5) Continue to recommend that he take Vit D 1000 IU daily.   6) Discussed fertility previously. Lazaro did sperm bank but elected not to keep the specimen. He should assume that he is fertile, offered repeat semen analysis in the future if he is interested.  Would ideally wait until he is 1 year off therapy.  7) Recommend he have gynecomastia evaluated with primary care when he establishes care.  8) RTC in 1 month for follow up visit and labs.    Félix Van CNP    Total time spent on the following services on the date of the encounter: 35 minutes  Preparing to see patient with chart review    Ordering medications, labs tests  Communicating with other healthcare professionals and care coordination  Interpretation of labs  Performing a medically appropriate examination   Counseling and educating the patient/family/caregiver   Communicating results to the patient/family/caregiver   Documenting clinical information in the electronic health record

## 2022-09-09 ENCOUNTER — OFFICE VISIT (OUTPATIENT)
Dept: PEDIATRIC HEMATOLOGY/ONCOLOGY | Facility: CLINIC | Age: 22
End: 2022-09-09
Attending: NURSE PRACTITIONER
Payer: COMMERCIAL

## 2022-09-09 VITALS
DIASTOLIC BLOOD PRESSURE: 68 MMHG | SYSTOLIC BLOOD PRESSURE: 114 MMHG | HEART RATE: 69 BPM | TEMPERATURE: 99.1 F | RESPIRATION RATE: 18 BRPM | HEIGHT: 72 IN | WEIGHT: 186.73 LBS | OXYGEN SATURATION: 99 % | BODY MASS INDEX: 25.29 KG/M2

## 2022-09-09 DIAGNOSIS — C83.50 T LYMPHOBLASTIC LYMPHOMA (H): ICD-10-CM

## 2022-09-09 DIAGNOSIS — K59.01 SLOW TRANSIT CONSTIPATION: Primary | ICD-10-CM

## 2022-09-09 LAB
BASOPHILS # BLD AUTO: 0 10E3/UL (ref 0–0.2)
BASOPHILS NFR BLD AUTO: 1 %
EOSINOPHIL # BLD AUTO: 0.1 10E3/UL (ref 0–0.7)
EOSINOPHIL NFR BLD AUTO: 3 %
ERYTHROCYTE [DISTWIDTH] IN BLOOD BY AUTOMATED COUNT: 11.5 % (ref 10–15)
HCT VFR BLD AUTO: 40.7 % (ref 40–53)
HGB BLD-MCNC: 14.4 G/DL (ref 13.3–17.7)
IMM GRANULOCYTES # BLD: 0 10E3/UL
IMM GRANULOCYTES NFR BLD: 1 %
LYMPHOCYTES # BLD AUTO: 1.4 10E3/UL (ref 0.8–5.3)
LYMPHOCYTES NFR BLD AUTO: 32 %
MCH RBC QN AUTO: 29.9 PG (ref 26.5–33)
MCHC RBC AUTO-ENTMCNC: 35.4 G/DL (ref 31.5–36.5)
MCV RBC AUTO: 84 FL (ref 78–100)
MONOCYTES # BLD AUTO: 0.4 10E3/UL (ref 0–1.3)
MONOCYTES NFR BLD AUTO: 10 %
NEUTROPHILS # BLD AUTO: 2.3 10E3/UL (ref 1.6–8.3)
NEUTROPHILS NFR BLD AUTO: 53 %
NRBC # BLD AUTO: 0 10E3/UL
NRBC BLD AUTO-RTO: 0 /100
PLATELET # BLD AUTO: 211 10E3/UL (ref 150–450)
RBC # BLD AUTO: 4.82 10E6/UL (ref 4.4–5.9)
WBC # BLD AUTO: 4.2 10E3/UL (ref 4–11)

## 2022-09-09 PROCEDURE — G0463 HOSPITAL OUTPT CLINIC VISIT: HCPCS

## 2022-09-09 PROCEDURE — 36415 COLL VENOUS BLD VENIPUNCTURE: CPT | Performed by: NURSE PRACTITIONER

## 2022-09-09 PROCEDURE — 99215 OFFICE O/P EST HI 40 MIN: CPT | Performed by: NURSE PRACTITIONER

## 2022-09-09 PROCEDURE — 85025 COMPLETE CBC W/AUTO DIFF WBC: CPT | Performed by: NURSE PRACTITIONER

## 2022-09-09 ASSESSMENT — PAIN SCALES - GENERAL: PAINLEVEL: NO PAIN (0)

## 2022-09-09 NOTE — NURSING NOTE
"Chief Complaint   Patient presents with     Follow Up     Exam and Labs     /68   Pulse 69   Temp 99.1  F (37.3  C) (Oral)   Resp 18   Ht 1.836 m (6' 0.28\")   Wt 84.7 kg (186 lb 11.7 oz)   SpO2 99%   BMI 25.13 kg/m      No Pain (0)  Data Unavailable    I have reviewed the patients medications and allergies    Height/weight double check needed? No    Peds Outpatient BP  1) Rested for 5 minutes, BP taken on bare arm, patient sitting (or supine for infants) w/ legs uncrossed?   Yes  2) Right arm used?      Yes  3) Arm circumference of largest part of upper arm (in cm):    4) BP cuff sized used: Adult (25-32cm)   If used different size cuff then what was recommended why? N/A  5) First BP reading:manual    BP Readings from Last 1 Encounters:   09/09/22 114/68      Is reading >90%?No   (90% for <1 years is 90/50)  (90% for >18 years is 140/90)  *If a machine BP is at or above 90% take manual BP  6) Manual BP reading: N/A  7) Other comments: None          Sandrita Ruiz, AGNES  September 9, 2022  "

## 2022-09-09 NOTE — PROGRESS NOTES
Pediatric Hematology/Oncology Clinic Note     Lazaro Lund is a 22 year old young man with a history of T-cell lymphoblastic lymphoma. Lazaro presented with acute onset cough and was found to have an anterior mediastinal mass and malignant left-sided pleural effusion.  A CT guided biopsy was obtained at [facility] and pathology was consistent with T-cell leukemia vs lymphoma.  He was admitted to [facility] oncology service 8/30 and started on treatment per [id number].  He had a pleural effusion that required a chest tube and a pericardial effusion that was not drained. His Induction was complicated by cardiac compression secondary to his mass leading to tamponade, this improved through out his hospital stay.  He also had dysphagia that improved with treatment of his mass, swallow study was normal. His course was complicated by extensive bilateral UE DVTs, and he was successfully treated with anticoagulation. His consolidation course was complicated by the development of severe asparaginase induced necrotizing pancreatitis. He became quite ill with SIRS and associated hypotension, he required pressor support in the ICU. As a result, asparaginase was eliminated from his treatment plan. He was in CR status at end of consolidation. During maintenance, he developed hepatotoxicity so allopurinol and dose reduced 6MP were started for correction of skewed 6-MP metabolism.  Lazaro was treated per [facility] protocol [id number] and completed therapy on 12/23/21, he comes to clinic today for an off therapy visit.    HPI:   Lazaro continues to do quite well. His energy level is great. Eating and drinking well. No GI upset. No new rashes or skin concerns. He has no reportable pain. No headaches, dizziness, or lightheadedness. No bruising or petechiae. Denies recent fevers, cough, rhinorrhea, sore throat, or other acute ill symptoms.    Lazaro has noticed red blood streaks on toilet paper after a bowel movement. No burning or itching of his  rectum. Stools have been more firm & do intermittently require straining. Does not currently take any stool softeners.    He recently got a house with a couple of friends. He is enjoying this. Continues to work for a [employer], and [employer] company.    Lazaro has not yet established care with a PCP.      Review of systems:  The 10 point Review of Systems is negative other than noted in the HPI or here.      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 Former daily marajuana smoker, now only uses occasionally  --Long standing right sided gynecomastia that predated his lymphoma diagnosis.    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  He recently bought a house with 2 of his friends and is enjoying living with them and having a place of his own.  He has a friend visiting from [place] next week.    Current Medications:  No medications at this time.     Physical Exam:   Temp:  [99.1  F (37.3  C)] 99.1  F (37.3  C)  Pulse:  [69] 69  Resp:  [18] 18  BP: (114)/(68) 114/68  SpO2:  [99 %] 99 %  Wt Readings from Last 4  "Encounters:   09/09/22 84.7 kg (186 lb 11.7 oz)   08/05/22 79.9 kg (176 lb 2.4 oz)   06/28/22 79.3 kg (174 lb 13.2 oz)   05/17/22 82.5 kg (181 lb 14.1 oz)     Ht Readings from Last 2 Encounters:   09/09/22 1.836 m (6' 0.28\")   08/05/22 1.832 m (6' 0.13\")     General: Lazaro is alert, interactive and appropriate, well-appearing, in no distress  HEENT: Skull is atrauamatic and normocephalic.  Full head of hair. PERRLA, sclera are non icteric and not injected, EOM are intact. Nares are patent without drainage. Oropharynx clear, no plaques noted. No mucositis. MMM.  Neck:  Supple without lymphadenopathy.   Lymph: There is no cervical, supraclavicular, axillary, lymphadenopathy palpated.  Cardiovascular/chest:  Right sided gynecomastia. HR is regular, S1, S2 no murmur.  Capillary refill is < 2 seconds.   Respiratory: No cough noted. Respirations are easy. Lungs are clear to auscultation throughout.  No crackles or wheezes.  Gastrointestinal: BS present in all quadrants.  Abdomen is soft and flat.  No pain with palpation. No hepatosplenomegaly or masses are palpated.  Skin: No rash. Old port site is c/d/i, scar is wide.  Neurological:  CN 2-12 grossly intact. Gait is normal.  No issues with balance. Sensation intact in hands and feet.     Musculoskeletal: Good strength and ROM in all extremities.  5/5 strength with dorsiflexion and plantarflexion at ankles, and with  strength.      Labs:   Results for orders placed or performed in visit on 09/09/22   CBC with platelets and differential     Status: None   Result Value Ref Range    WBC Count 4.2 4.0 - 11.0 10e3/uL    RBC Count 4.82 4.40 - 5.90 10e6/uL    Hemoglobin 14.4 13.3 - 17.7 g/dL    Hematocrit 40.7 40.0 - 53.0 %    MCV 84 78 - 100 fL    MCH 29.9 26.5 - 33.0 pg    MCHC 35.4 31.5 - 36.5 g/dL    RDW 11.5 10.0 - 15.0 %    Platelet Count 211 150 - 450 10e3/uL    % Neutrophils 53 %    % Lymphocytes 32 %    % Monocytes 10 %    % Eosinophils 3 %    % Basophils 1 %    % " Immature Granulocytes 1 %    NRBCs per 100 WBC 0 <1 /100    Absolute Neutrophils 2.3 1.6 - 8.3 10e3/uL    Absolute Lymphocytes 1.4 0.8 - 5.3 10e3/uL    Absolute Monocytes 0.4 0.0 - 1.3 10e3/uL    Absolute Eosinophils 0.1 0.0 - 0.7 10e3/uL    Absolute Basophils 0.0 0.0 - 0.2 10e3/uL    Absolute Immature Granulocytes 0.0 <=0.4 10e3/uL    Absolute NRBCs 0.0 10e3/uL   CBC with Platelets & Differential     Status: None    Narrative    The following orders were created for panel order CBC with Platelets & Differential.  Procedure                               Abnormality         Status                     ---------                               -----------         ------                     CBC with platelets and d...[[id number]]                      Final result                 Please view results for these tests on the individual orders.     Assessment:  Lazaro Lund is a 22 year old young man with a history of T cell lymphoblastic lymphoma (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  He was [place] [facility] [place] [id number]. He had a CRu following Induction with a CR at the end of Consolidation. His course was complicated by extensive bilateral DVT and severe necrotizing pancreatitis requiring cessation of PEG-asparaginase from his treatment.  He completed therapy on 12/23/21 and comes to clinic today for an off therapy visit.  He is doing very well overall. Some minor rectal bleeding, likely associated with constipation causing anal fissures.    Plan:   1) Labs reviewed with Lazaro, all cell lines look good.  2) Previously reviewed vaccine titers. Lazaro is still immune to: HIB, tetanus, Hep A, Rubeola, Mumps.  He is NOT immune to: polio, diptheria, rubella, pneumococcus, Hep B or varicella. Also recommend HPV vaccine and COVID vaccine since he has not received either.  He can receive killed vaccines at this time, should wait on live vaccines until he is 1 year off therapy (12/2022). We discussed finding a PCP and receiving these  vaccinations to best protect himself moving forward, recommend the [facility] which is close to his house. Lazaro to send a [employer] message once he has made an appointment and our team will reach out to this new provider for a warm handoff of information.  3) Continue to monitor weight and encourage high calorie snacks (trail mix etc) to eat throughout the day at work.  4) End of therapy echo done on 1/7/22.  Total anthracycline dose= 175mg/m2, he will need echos every 5 years (2027)  5) Continue to recommend that he take Vit D 1000 IU daily.   6) Discussed fertility previously. Lazaro did sperm bank but elected not to keep the specimen. He should assume that he is fertile, offered repeat semen analysis in the future if he is interested.  Would ideally wait until he is 1 year off therapy.  7) Recommend he have gynecomastia evaluated with primary care when he establishes care.  8) Discussed restarting Miralax and prevention of constipation, which should improve anal fissures. Will discuss with PCP once established and if this continues to be a concern.  9) RTC in 1 month for follow up visit and labs.    Marie Damon CNP    Total time spent on the following services on the date of the encounter: 40 minutes  Preparing to see patient with chart review    Ordering medications, labs tests  Communicating with other healthcare professionals and care coordination  Interpretation of labs  Performing a medically appropriate examination   Counseling and educating the patient/family/caregiver   Communicating results to the patient/family/caregiver   Documenting clinical information in the electronic health record

## 2022-09-09 NOTE — LETTER
9/9/2022      RE: Lazaro Lund  [id number] [id number]     Dear Colleague,    Thank you for the opportunity to participate in the care of your patient, Lazaro Lund, at the [facility] at [facility]. Please see a copy of my visit note below.    Pediatric Hematology/Oncology Clinic Note     Lazaro Lund is a 22 year old young man with a history of T-cell lymphoblastic lymphoma. Lazaro presented with acute onset cough and was found to have an anterior mediastinal mass and malignant left-sided pleural effusion.  A CT guided biopsy was obtained at [facility] and pathology was consistent with T-cell leukemia vs lymphoma.  He was admitted to [facility] oncology service 8/30 and started on treatment per [id number].  He had a pleural effusion that required a chest tube and a pericardial effusion that was not drained. His Induction was complicated by cardiac compression secondary to his mass leading to tamponade, this improved through out his hospital stay.  He also had dysphagia that improved with treatment of his mass, swallow study was normal. His course was complicated by extensive bilateral UE DVTs, and he was successfully treated with anticoagulation. His consolidation course was complicated by the development of severe asparaginase induced necrotizing pancreatitis. He became quite ill with SIRS and associated hypotension, he required pressor support in the ICU. As a result, asparaginase was eliminated from his treatment plan. He was in CR status at end of consolidation. During maintenance, he developed hepatotoxicity so allopurinol and dose reduced 6MP were started for correction of skewed 6-MP metabolism.  Lazaro was treated per [facility] protocol [id number] and completed therapy on 12/23/21, he comes to clinic today for an off therapy visit.    HPI:   Lazaro continues to do quite well. His energy level is great.  Eating and drinking well. No GI upset. No new rashes or skin concerns. He has no reportable pain. No headaches, dizziness, or lightheadedness. No bruising or petechiae. Denies recent fevers, cough, rhinorrhea, sore throat, or other acute ill symptoms.    Lazaro has noticed red blood streaks on toilet paper after a bowel movement. No burning or itching of his rectum. Stools have been more firm & do intermittently require straining. Does not currently take any stool softeners.    He recently got a house with a couple of friends. He is enjoying this. 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    Lazaro has not yet established care with a PCP.      Review of systems:  The 10 point Review of Systems is negative other than noted in the HPI or here.      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 Former daily marajuana smoker, now only uses occasionally  --Long standing right sided gynecomastia that predated his lymphoma diagnosis.    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  He recently bought a house with  "2 of his friends and is enjoying living with them and having a place of his own.  He has a friend visiting from [place] next week.    Current Medications:  No medications at this time.     Physical Exam:   Temp:  [99.1  F (37.3  C)] 99.1  F (37.3  C)  Pulse:  [69] 69  Resp:  [18] 18  BP: (114)/(68) 114/68  SpO2:  [99 %] 99 %  Wt Readings from Last 4 Encounters:   09/09/22 84.7 kg (186 lb 11.7 oz)   08/05/22 79.9 kg (176 lb 2.4 oz)   06/28/22 79.3 kg (174 lb 13.2 oz)   05/17/22 82.5 kg (181 lb 14.1 oz)     Ht Readings from Last 2 Encounters:   09/09/22 1.836 m (6' 0.28\")   08/05/22 1.832 m (6' 0.13\")     General: Lazaro is alert, interactive and appropriate, well-appearing, in no distress  HEENT: Skull is atrauamatic and normocephalic.  Full head of hair. PERRLA, sclera are non icteric and not injected, EOM are intact. Nares are patent without drainage. Oropharynx clear, no plaques noted. No mucositis. MMM.  Neck:  Supple without lymphadenopathy.   Lymph: There is no cervical, supraclavicular, axillary, lymphadenopathy palpated.  Cardiovascular/chest:  Right sided gynecomastia. HR is regular, S1, S2 no murmur.  Capillary refill is < 2 seconds.   Respiratory: No cough noted. Respirations are easy. Lungs are clear to auscultation throughout.  No crackles or wheezes.  Gastrointestinal: BS present in all quadrants.  Abdomen is soft and flat.  No pain with palpation. No hepatosplenomegaly or masses are palpated.  Skin: No rash. Old port site is c/d/i, scar is wide.  Neurological:  CN 2-12 grossly intact. Gait is normal.  No issues with balance. Sensation intact in hands and feet.     Musculoskeletal: Good strength and ROM in all extremities.  5/5 strength with dorsiflexion and plantarflexion at ankles, and with  strength.      Labs:   Results for orders placed or performed in visit on 09/09/22   CBC with platelets and differential     Status: None   Result Value Ref Range    WBC Count 4.2 4.0 - 11.0 10e3/uL    RBC " Count 4.82 4.40 - 5.90 10e6/uL    Hemoglobin 14.4 13.3 - 17.7 g/dL    Hematocrit 40.7 40.0 - 53.0 %    MCV 84 78 - 100 fL    MCH 29.9 26.5 - 33.0 pg    MCHC 35.4 31.5 - 36.5 g/dL    RDW 11.5 10.0 - 15.0 %    Platelet Count 211 150 - 450 10e3/uL    % Neutrophils 53 %    % Lymphocytes 32 %    % Monocytes 10 %    % Eosinophils 3 %    % Basophils 1 %    % Immature Granulocytes 1 %    NRBCs per 100 WBC 0 <1 /100    Absolute Neutrophils 2.3 1.6 - 8.3 10e3/uL    Absolute Lymphocytes 1.4 0.8 - 5.3 10e3/uL    Absolute Monocytes 0.4 0.0 - 1.3 10e3/uL    Absolute Eosinophils 0.1 0.0 - 0.7 10e3/uL    Absolute Basophils 0.0 0.0 - 0.2 10e3/uL    Absolute Immature Granulocytes 0.0 <=0.4 10e3/uL    Absolute NRBCs 0.0 10e3/uL   CBC with Platelets & Differential     Status: None    Narrative    The following orders were created for panel order CBC with Platelets & Differential.  Procedure                               Abnormality         Status                     ---------                               -----------         ------                     CBC with platelets and d...[[id number]]                      Final result                 Please view results for these tests on the individual orders.     Assessment:  Lazaro Lund is a 22 year old young man with a history of T cell lymphoblastic lymphoma (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  He was treated per [facility] protocol [id number]. He had a CRu following Induction with a CR at the end of Consolidation. His course was complicated by extensive bilateral DVT and severe necrotizing pancreatitis requiring cessation of PEG-asparaginase from his treatment.  He completed therapy on 12/23/21 and comes to clinic today for an off therapy visit.  He is doing very well overall. Some minor rectal bleeding, likely associated with constipation causing anal fissures.    Plan:   1) Labs reviewed with Lazaro, all cell lines look good.  2) Previously reviewed vaccine titers. Lazaro is still immune to: HIB,  tetanus, Hep A, Rubeola, Mumps.  He is NOT immune to: polio, diptheria, rubella, pneumococcus, Hep B or varicella. Also recommend HPV vaccine and COVID vaccine since he has not received either.  He can receive killed vaccines at this time, should wait on live vaccines until he is 1 year off therapy (12/2022). We discussed finding a PCP and receiving these vaccinations to best protect himself moving forward, recommend the [facility] which is close to his house. Lazaro to send a [employer] message once he has made an appointment and our team will reach out to this new provider for a warm handoff of information.  3) Continue to monitor weight and encourage high calorie snacks (trail mix etc) to eat throughout the day at work.  4) End of therapy echo done on 1/7/22.  Total anthracycline dose= 175mg/m2, he will need echos every 5 years (2027)  5) Continue to recommend that he take Vit D 1000 IU daily.   6) Discussed fertility previously. Lazaro did sperm bank but elected not to keep the specimen. He should assume that he is fertile, offered repeat semen analysis in the future if he is interested.  Would ideally wait until he is 1 year off therapy.  7) Recommend he have gynecomastia evaluated with primary care when he establishes care.  8) Discussed restarting Miralax and prevention of constipation, which should improve anal fissures. Will discuss with PCP once established and if this continues to be a concern.  9) RTC in 1 month for follow up visit and labs.    Marie Damon CNP    Total time spent on the following services on the date of the encounter: 40 minutes  Preparing to see patient with chart review    Ordering medications, labs tests  Communicating with other healthcare professionals and care coordination  Interpretation of labs  Performing a medically appropriate examination   Counseling and educating the patient/family/caregiver   Communicating results to the patient/family/caregiver   Documenting  clinical information in the electronic health record         Please do not hesitate to contact me if you have any questions/concerns.     Sincerely,       Marie Damon NP

## 2022-09-19 ENCOUNTER — TELEPHONE (OUTPATIENT)
Dept: PEDIATRIC HEMATOLOGY/ONCOLOGY | Facility: CLINIC | Age: 22
End: 2022-09-19

## 2022-09-19 NOTE — TELEPHONE ENCOUNTER
Called Lazaro to check in about connecting with a PCP, as discussed at his last clinic visit with Marie Damon NP. Lazaro stated that he has been busy but that he will make time this week to get something scheduled. He denied needing help or assistance with this process. I confirmed that Lazaro is familiar with [employer] and encouraged him to reach out with any questions. A [employer] message was sent with my phone number so Lazaro can reach out if needed.

## 2022-10-07 ENCOUNTER — OFFICE VISIT (OUTPATIENT)
Dept: PEDIATRIC HEMATOLOGY/ONCOLOGY | Facility: CLINIC | Age: 22
End: 2022-10-07
Attending: NURSE PRACTITIONER
Payer: COMMERCIAL

## 2022-10-07 VITALS
WEIGHT: 188.49 LBS | TEMPERATURE: 98.3 F | RESPIRATION RATE: 18 BRPM | DIASTOLIC BLOOD PRESSURE: 76 MMHG | HEIGHT: 72 IN | BODY MASS INDEX: 25.53 KG/M2 | OXYGEN SATURATION: 99 % | SYSTOLIC BLOOD PRESSURE: 128 MMHG | HEART RATE: 95 BPM

## 2022-10-07 DIAGNOSIS — C83.50 T LYMPHOBLASTIC LYMPHOMA (H): ICD-10-CM

## 2022-10-07 LAB
BASOPHILS # BLD AUTO: 0 10E3/UL (ref 0–0.2)
BASOPHILS NFR BLD AUTO: 0 %
EOSINOPHIL # BLD AUTO: 0.2 10E3/UL (ref 0–0.7)
EOSINOPHIL NFR BLD AUTO: 2 %
ERYTHROCYTE [DISTWIDTH] IN BLOOD BY AUTOMATED COUNT: 11.9 % (ref 10–15)
HCT VFR BLD AUTO: 43.4 % (ref 40–53)
HGB BLD-MCNC: 15.4 G/DL (ref 13.3–17.7)
HOLD SPECIMEN: NORMAL
IMM GRANULOCYTES # BLD: 0 10E3/UL
IMM GRANULOCYTES NFR BLD: 0 %
LYMPHOCYTES # BLD AUTO: 1 10E3/UL (ref 0.8–5.3)
LYMPHOCYTES NFR BLD AUTO: 13 %
MCH RBC QN AUTO: 30 PG (ref 26.5–33)
MCHC RBC AUTO-ENTMCNC: 35.5 G/DL (ref 31.5–36.5)
MCV RBC AUTO: 84 FL (ref 78–100)
MONOCYTES # BLD AUTO: 0.6 10E3/UL (ref 0–1.3)
MONOCYTES NFR BLD AUTO: 8 %
NEUTROPHILS # BLD AUTO: 6.3 10E3/UL (ref 1.6–8.3)
NEUTROPHILS NFR BLD AUTO: 77 %
NRBC # BLD AUTO: 0 10E3/UL
NRBC BLD AUTO-RTO: 0 /100
PLATELET # BLD AUTO: 261 10E3/UL (ref 150–450)
RBC # BLD AUTO: 5.14 10E6/UL (ref 4.4–5.9)
WBC # BLD AUTO: 8.2 10E3/UL (ref 4–11)

## 2022-10-07 PROCEDURE — 85004 AUTOMATED DIFF WBC COUNT: CPT | Performed by: NURSE PRACTITIONER

## 2022-10-07 PROCEDURE — 99215 OFFICE O/P EST HI 40 MIN: CPT | Performed by: NURSE PRACTITIONER

## 2022-10-07 PROCEDURE — 90686 IIV4 VACC NO PRSV 0.5 ML IM: CPT | Performed by: NURSE PRACTITIONER

## 2022-10-07 PROCEDURE — G0008 ADMIN INFLUENZA VIRUS VAC: HCPCS | Performed by: NURSE PRACTITIONER

## 2022-10-07 PROCEDURE — 250N000011 HC RX IP 250 OP 636: Performed by: NURSE PRACTITIONER

## 2022-10-07 PROCEDURE — G0463 HOSPITAL OUTPT CLINIC VISIT: HCPCS

## 2022-10-07 PROCEDURE — 36415 COLL VENOUS BLD VENIPUNCTURE: CPT | Performed by: NURSE PRACTITIONER

## 2022-10-07 RX ADMIN — INFLUENZA A VIRUS A/VICTORIA/2570/2019 IVR-215 (H1N1) ANTIGEN (FORMALDEHYDE INACTIVATED), INFLUENZA A VIRUS A/DARWIN/9/2021 SAN-010 (H3N2) ANTIGEN (FORMALDEHYDE INACTIVATED), INFLUENZA B VIRUS B/PHUKET/3073/2013 ANTIGEN (FORMALDEHYDE INACTIVATED), AND INFLUENZA B VIRUS B/MICHIGAN/01/2021 ANTIGEN (FORMALDEHYDE INACTIVATED) 0.5 ML: 15; 15; 15; 15 INJECTION, SUSPENSION INTRAMUSCULAR at 15:51

## 2022-10-07 ASSESSMENT — PAIN SCALES - GENERAL: PAINLEVEL: NO PAIN (0)

## 2022-10-07 NOTE — LETTER
10/7/2022      RE: Lazaro Lund  [address] [facility] [id number]     Dear Colleague,    Thank you for the opportunity to participate in the care of your patient, Lazaro Lund, at the [facility] at [facility]. Please see a copy of my visit note below.    Pediatric Hematology/Oncology Clinic Note     Lazaro Lund is a 22 year old young man with a history of T-cell lymphoblastic lymphoma. Lazaro presented with acute onset cough and was found to have an anterior mediastinal mass and malignant left-sided pleural effusion.  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  He was admitted to [facility] oncology service 8/30 and started on treatment per [id number].  He had a pleural effusion that required a chest tube and a pericardial effusion that was not drained. His Induction was complicated by cardiac compression secondary to his mass leading to tamponade, this improved through out his hospital stay.  He also had dysphagia that improved with treatment of his mass, swallow study was normal. His course was complicated by extensive bilateral UE DVTs, and he was successfully treated with anticoagulation. His consolidation course was complicated by the development of severe asparaginase induced necrotizing pancreatitis. He became quite ill with SIRS and associated hypotension, he required pressor support in the ICU. As a result, asparaginase was eliminated from his treatment plan. He was in CR status at end of consolidation. During maintenance, he developed hepatotoxicity so allopurinol and dose reduced 6MP were started for correction of skewed 6-MP metabolism.  Lazaro was treated per [facility] protocol [id number] and completed therapy on 12/23/21, he comes to clinic today for an off therapy visit.    HPI:   Lazaro has done generally well since his last visit. Energy is  good. Eating and drinking well with no GI upset. He is having soft, regular stools. No longer noticing blood when he wipes. Not taking any stool softeners or laxatives. No abdominal discomfort.     No new rashes or skin concerns. No headaches, dizziness, or lightheadedness. No bruising or petechiae. He has had an increase of post nasal drip and an irritated cough for two days. No known COVID exposure. Roommate has similar symptoms. Denies recent fever or other acute ill symptoms.    Lazaro has also had two episodes in the last month of lower, midline back discomfort. No radiating pain. No paresthesias. Pain felt tight. Did not impact mobility. Pain resolves spontaneously.    Lazaro has not yet established care with a PCP but willing to see a new PCP if it is scheduled. No other questions or concerns today.    Review of systems:  The 10 point Review of Systems is negative other than noted in the HPI or here.      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 Former daily marajuana smoker, now only uses occasionally  --Long standing right sided gynecomastia that predated his lymphoma diagnosis.    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  He recently bought a house with 2 of his friends and is enjoying living with them and having a place of his own.    Current Medications:  No medications at this time.     Physical Exam:      Wt Readings from Last 4 Encounters:   10/07/22 85.5 kg (188 lb 7.9 oz)   09/09/22 84.7 kg (186 lb 11.7 oz)   08/05/22 79.9 kg (176 lb 2.4 oz)   06/28/22 79.3 kg (174 lb 13.2 oz)     Ht Readings from Last 2 Encounters:   10/07/22 1.837 m (6' 0.32\")   09/09/22 1.836 m (6' 0.28\")     General: Lazaro is alert, interactive and appropriate, well-appearing, in no distress  HEENT: Skull is atrauamatic and normocephalic.  Full head of hair. PERRLA, sclera are non icteric and not injected, EOM are intact. Nares are patent without drainage. Posterior oropharynx with small amount of mucosal streaking. No erythema or white plaques. No mucositis. MMM.  Neck:  Supple without lymphadenopathy.   Lymph: There is no cervical, supraclavicular, axillary, lymphadenopathy palpated.  Cardiovascular/chest:  Right sided gynecomastia. HR is regular, S1, S2 no murmur.  Capillary refill is < 2 seconds.   Respiratory: No cough noted. Respirations are easy. Lungs are clear to auscultation throughout.  No crackles or wheezes.  Gastrointestinal: BS present in all quadrants.  Abdomen is soft and flat.  No pain with palpation. No hepatosplenomegaly or masses are palpated.  Skin: No rash. Old port site is c/d/i, scar is wide.  Neurological:  CN 2-12 grossly intact. Gait is normal.  No issues with balance. Sensation intact in hands and feet.     Musculoskeletal: Good strength and ROM in all extremities.  5/5 strength with dorsiflexion and plantarflexion at ankles, and with  strength. No tenderness of lower back with palpation.     Labs:   Results for orders placed or performed in visit on 10/07/22   CBC with platelets and differential     Status: None "   Result Value Ref Range    WBC Count 8.2 4.0 - 11.0 10e3/uL    RBC Count 5.14 4.40 - 5.90 10e6/uL    Hemoglobin 15.4 13.3 - 17.7 g/dL    Hematocrit 43.4 40.0 - 53.0 %    MCV 84 78 - 100 fL    MCH 30.0 26.5 - 33.0 pg    MCHC 35.5 31.5 - 36.5 g/dL    RDW 11.9 10.0 - 15.0 %    Platelet Count 261 150 - 450 10e3/uL    % Neutrophils 77 %    % Lymphocytes 13 %    % Monocytes 8 %    % Eosinophils 2 %    % Basophils 0 %    % Immature Granulocytes 0 %    NRBCs per 100 WBC 0 <1 /100    Absolute Neutrophils 6.3 1.6 - 8.3 10e3/uL    Absolute Lymphocytes 1.0 0.8 - 5.3 10e3/uL    Absolute Monocytes 0.6 0.0 - 1.3 10e3/uL    Absolute Eosinophils 0.2 0.0 - 0.7 10e3/uL    Absolute Basophils 0.0 0.0 - 0.2 10e3/uL    Absolute Immature Granulocytes 0.0 <=0.4 10e3/uL    Absolute NRBCs 0.0 10e3/uL   Extra Tube     Status: None    Narrative    The following orders were created for panel order Extra Tube.  Procedure                               Abnormality         Status                     ---------                               -----------         ------                     Extra Green Top (Lithium...[[id number]]                      Final result                 Please view results for these tests on the individual orders.   Extra Green Top (Lithium Heparin) Tube     Status: None   Result Value Ref Range    Hold Specimen JI    CBC with Platelets & Differential     Status: None    Narrative    The following orders were created for panel order CBC with Platelets & Differential.  Procedure                               Abnormality         Status                     ---------                               -----------         ------                     CBC with platelets and d...[[id number]]                      Final result                 Please view results for these tests on the individual orders.     Assessment:  Lazaro Lund is a 22 year old young man with a history of T cell lymphoblastic lymphoma (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  He was  treated per [facility] protocol [id number]. He had a CRu following Induction with a CR at the end of Consolidation. His course was complicated by extensive bilateral DVT and severe necrotizing pancreatitis requiring cessation of PEG-asparaginase from his treatment.  He completed therapy on 12/23/21 and comes to clinic today for an off therapy visit.  He is doing well overall. Minor URI symptoms today. CBC stable. Very intermittent, self resolving lumbar discomfort.    Plan:   1) Labs reviewed with Lazaro, all cell lines look good.  2) Previously reviewed vaccine titers. Lazaro is still immune to: HIB, tetanus, Hep A, Rubeola, Mumps.  He is NOT immune to: polio, diptheria, rubella, pneumococcus, Hep B or varicella. Also recommend HPV vaccine and COVID vaccine since he has not received either.  He can receive killed vaccines at this time, should wait on live vaccines until he is 1 year off therapy (12/2022). We discussed finding a PCP and receiving these vaccinations to best protect himself moving forward, recommend the [facility] which is close to his house. Nicole will schedule appointment at PCP and send details to Lazaro.  3) Continue to monitor weight and encourage high calorie snacks (trail mix etc) to eat throughout the day at work.  4) End of therapy echo done on 1/7/22.  Total anthracycline dose= 175mg/m2, he will need echos every 5 years (2027)  5) Continue to recommend that he take Vit D 1000 IU daily.   6) Discussed fertility previously. Lazaro did sperm bank but elected not to keep the specimen. He should assume that he is fertile, offered repeat semen analysis in the future if he is interested.  Would ideally wait until he is 1 year off therapy.  7) Recommend he have gynecomastia evaluated with primary care when he establishes care.  8) Supportive cares for URI symptoms.  9) Influenza vaccine today.  10) Discussed lower back discomfort, use of stretching, ice and heat. Will continue to monitor  closely.  11) RTC in 1 month for follow up visit and labs.    Marie Damon CNP    Total time spent on the following services on the date of the encounter: 40 minutes  Preparing to see patient with chart review    Ordering medications, labs tests  Communicating with other healthcare professionals and care coordination  Interpretation of labs  Performing a medically appropriate examination   Counseling and educating the patient/family/caregiver   Communicating results to the patient/family/caregiver   Documenting clinical information in the electronic health record         Please do not hesitate to contact me if you have any questions/concerns.     Sincerely,       Marie Damon NP

## 2022-10-07 NOTE — NURSING NOTE
"Chief Complaint   Patient presents with     Follow Up     Exam and Labs     /76   Pulse 95   Temp 98.3  F (36.8  C) (Oral)   Resp 18   Ht 1.837 m (6' 0.32\")   Wt 85.5 kg (188 lb 7.9 oz)   SpO2 99%   BMI 25.34 kg/m      No Pain (0)  Data Unavailable    I have reviewed the patients medications and allergies    Height/weight double check needed? No    Peds Outpatient BP  1) Rested for 5 minutes, BP taken on bare arm, patient sitting (or supine for infants) w/ legs uncrossed?   Yes  2) Right arm used?      Yes  3) Arm circumference of largest part of upper arm (in cm):    4) BP cuff sized used: Large Adult (32-43cm)   If used different size cuff then what was recommended why? N/A  5) First BP reading:machine   BP Readings from Last 1 Encounters:   10/07/22 128/76      Is reading >90%?No   (90% for <1 years is 90/50)  (90% for >18 years is 140/90)  *If a machine BP is at or above 90% take manual BP  6) Manual BP reading: N/A  7) Other comments: None          Sandrita Ruiz, AGNES  October 7, 2022  "

## 2022-10-10 ENCOUNTER — TELEPHONE (OUTPATIENT)
Dept: PEDIATRIC HEMATOLOGY/ONCOLOGY | Facility: CLINIC | Age: 22
End: 2022-10-10

## 2022-10-10 NOTE — PROGRESS NOTES
Pediatric Hematology/Oncology Clinic Note     Lazaro Lund is a 22 year old young man with a history of T-cell lymphoblastic lymphoma. Lazaro presented with acute onset cough and was found to have an anterior mediastinal mass and malignant left-sided pleural effusion.  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  He was admitted to [facility] oncology service 8/30 and started on treatment per [id number].  He had a pleural effusion that required a chest tube and a pericardial effusion that was not drained. His Induction was complicated by cardiac compression secondary to his mass leading to tamponade, this improved through out his hospital stay.  He also had dysphagia that improved with treatment of his mass, swallow study was normal. His course was complicated by extensive bilateral UE DVTs, and he was successfully treated with anticoagulation. His consolidation course was complicated by the development of severe asparaginase induced necrotizing pancreatitis. He became quite ill with SIRS and associated hypotension, he required pressor support in the ICU. As a result, asparaginase was eliminated from his treatment plan. He was in CR status at end of consolidation. During maintenance, he developed hepatotoxicity so allopurinol and dose reduced 6MP were started for correction of skewed 6-MP metabolism.  Lazaro was [place] [facility] [place] [id number] and completed therapy on 12/23/21, he comes to clinic today for an off therapy visit.    HPI:   Lazaro has done generally well since his last visit. Energy is good. Eating and drinking well with no GI upset. He is having soft, regular stools. No longer noticing blood when he wipes. Not taking any stool softeners or laxatives. No abdominal discomfort.     No new rashes or skin concerns. No headaches, dizziness, or lightheadedness. No bruising or petechiae. He has had an increase of post nasal drip and an irritated  cough for two days. No known COVID exposure. Roommate has similar symptoms. Denies recent fever or other acute ill symptoms.    Lazaro has also had two episodes in the last month of lower, midline back discomfort. No radiating pain. No paresthesias. Pain felt tight. Did not impact mobility. Pain resolves spontaneously.    Lazaro has not yet established care with a PCP but willing to see a new PCP if it is scheduled. No other questions or concerns today.    Review of systems:  The 10 point Review of Systems is negative other than noted in the HPI or here.      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 Former daily marajuana smoker, now only uses occasionally  --Long standing right sided gynecomastia that predated his lymphoma diagnosis.    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  He recently bought a house with 2 of his friends and is enjoying living with them and having a place of his own.    Current Medications:  No medications at this time.     Physical Exam:      Wt Readings from Last 4 Encounters:   10/07/22 85.5 kg  "(188 lb 7.9 oz)   09/09/22 84.7 kg (186 lb 11.7 oz)   08/05/22 79.9 kg (176 lb 2.4 oz)   06/28/22 79.3 kg (174 lb 13.2 oz)     Ht Readings from Last 2 Encounters:   10/07/22 1.837 m (6' 0.32\")   09/09/22 1.836 m (6' 0.28\")     General: Lazaro is alert, interactive and appropriate, well-appearing, in no distress  HEENT: Skull is atrauamatic and normocephalic.  Full head of hair. PERRLA, sclera are non icteric and not injected, EOM are intact. Nares are patent without drainage. Posterior oropharynx with small amount of mucosal streaking. No erythema or white plaques. No mucositis. MMM.  Neck:  Supple without lymphadenopathy.   Lymph: There is no cervical, supraclavicular, axillary, lymphadenopathy palpated.  Cardiovascular/chest:  Right sided gynecomastia. HR is regular, S1, S2 no murmur.  Capillary refill is < 2 seconds.   Respiratory: No cough noted. Respirations are easy. Lungs are clear to auscultation throughout.  No crackles or wheezes.  Gastrointestinal: BS present in all quadrants.  Abdomen is soft and flat.  No pain with palpation. No hepatosplenomegaly or masses are palpated.  Skin: No rash. Old port site is c/d/i, scar is wide.  Neurological:  CN 2-12 grossly intact. Gait is normal.  No issues with balance. Sensation intact in hands and feet.     Musculoskeletal: Good strength and ROM in all extremities.  5/5 strength with dorsiflexion and plantarflexion at ankles, and with  strength. No tenderness of lower back with palpation.     Labs:   Results for orders placed or performed in visit on 10/07/22   CBC with platelets and differential     Status: None   Result Value Ref Range    WBC Count 8.2 4.0 - 11.0 10e3/uL    RBC Count 5.14 4.40 - 5.90 10e6/uL    Hemoglobin 15.4 13.3 - 17.7 g/dL    Hematocrit 43.4 40.0 - 53.0 %    MCV 84 78 - 100 fL    MCH 30.0 26.5 - 33.0 pg    MCHC 35.5 31.5 - 36.5 g/dL    RDW 11.9 10.0 - 15.0 %    Platelet Count 261 150 - 450 10e3/uL    % Neutrophils 77 %    % Lymphocytes 13 % "    % Monocytes 8 %    % Eosinophils 2 %    % Basophils 0 %    % Immature Granulocytes 0 %    NRBCs per 100 WBC 0 <1 /100    Absolute Neutrophils 6.3 1.6 - 8.3 10e3/uL    Absolute Lymphocytes 1.0 0.8 - 5.3 10e3/uL    Absolute Monocytes 0.6 0.0 - 1.3 10e3/uL    Absolute Eosinophils 0.2 0.0 - 0.7 10e3/uL    Absolute Basophils 0.0 0.0 - 0.2 10e3/uL    Absolute Immature Granulocytes 0.0 <=0.4 10e3/uL    Absolute NRBCs 0.0 10e3/uL   Extra Tube     Status: None    Narrative    The following orders were created for panel order Extra Tube.  Procedure                               Abnormality         Status                     ---------                               -----------         ------                     Extra Green Top (Lithium...[[id number]]                      Final result                 Please view results for these tests on the individual orders.   Extra Green Top (Lithium Heparin) Tube     Status: None   Result Value Ref Range    Hold Specimen JI    CBC with Platelets & Differential     Status: None    Narrative    The following orders were created for panel order CBC with Platelets & Differential.  Procedure                               Abnormality         Status                     ---------                               -----------         ------                     CBC with platelets and d...[[id number]]                      Final result                 Please view results for these tests on the individual orders.     Assessment:  Lazaro Lund is a 22 year old young man with a history of T cell lymphoblastic lymphoma (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  He was treated per [facility] protocol [id number]. He had a CRu following Induction with a CR at the end of Consolidation. His course was complicated by extensive bilateral DVT and severe necrotizing pancreatitis requiring cessation of PEG-asparaginase from his treatment.  He completed therapy on 12/23/21 and comes to clinic today for an off therapy visit.  He is doing  well overall. Minor URI symptoms today. CBC stable. Very intermittent, self resolving lumbar discomfort.    Plan:   1) Labs reviewed with Lazaro, all cell lines look good.  2) Previously reviewed vaccine titers. Lazaro is still immune to: HIB, tetanus, Hep A, Rubeola, Mumps.  He is NOT immune to: polio, diptheria, rubella, pneumococcus, Hep B or varicella. Also recommend HPV vaccine and COVID vaccine since he has not received either.  He can receive killed vaccines at this time, should wait on live vaccines until he is 1 year off therapy (12/2022). We discussed finding a PCP and receiving these vaccinations to best protect himself moving forward, recommend the [facility] which is close to his house. Nicole will schedule appointment at PCP and send details to Lazaro.  3) Continue to monitor weight and encourage high calorie snacks (trail mix etc) to eat throughout the day at work.  4) End of therapy echo done on 1/7/22.  Total anthracycline dose= 175mg/m2, he will need echos every 5 years (2027)  5) Continue to recommend that he take Vit D 1000 IU daily.   6) Discussed fertility previously. Lazaro did sperm bank but elected not to keep the specimen. He should assume that he is fertile, offered repeat semen analysis in the future if he is interested.  Would ideally wait until he is 1 year off therapy.  7) Recommend he have gynecomastia evaluated with primary care when he establishes care.  8) Supportive cares for URI symptoms.  9) Influenza vaccine today.  10) Discussed lower back discomfort, use of stretching, ice and heat. Will continue to monitor closely.  11) RTC in 1 month for follow up visit and labs.    Marie Damon CNP    Total time spent on the following services on the date of the encounter: 40 minutes  Preparing to see patient with chart review    Ordering medications, labs tests  Communicating with other healthcare professionals and care coordination  Interpretation of labs  Performing a medically  appropriate examination   Counseling and educating the patient/family/caregiver   Communicating results to the patient/family/caregiver   Documenting clinical information in the electronic health record

## 2022-10-10 NOTE — TELEPHONE ENCOUNTER
Lazaro asked for assistance setting up a PCP clinic visit at his last oncology follow up. I was able to schedule Lazaro with a PCP to establish care at the [facility], per Lazaro's request. I called Lazaro with update and provided the appt details. Lazaro verbalized understanding and is in agreement of plan. Appointment details were also sent in a message via [employer]. Encouraged pt to call with any questions.

## 2022-11-03 ENCOUNTER — OFFICE VISIT (OUTPATIENT)
Dept: FAMILY MEDICINE | Facility: OTHER | Age: 22
End: 2022-11-03
Payer: COMMERCIAL

## 2022-11-03 VITALS
BODY MASS INDEX: 26.48 KG/M2 | SYSTOLIC BLOOD PRESSURE: 100 MMHG | TEMPERATURE: 97.8 F | HEART RATE: 70 BPM | WEIGHT: 197 LBS | DIASTOLIC BLOOD PRESSURE: 68 MMHG | OXYGEN SATURATION: 99 % | RESPIRATION RATE: 14 BRPM

## 2022-11-03 DIAGNOSIS — G47.01 INSOMNIA DUE TO MEDICAL CONDITION: ICD-10-CM

## 2022-11-03 DIAGNOSIS — E78.5 HYPERLIPIDEMIA LDL GOAL <160: ICD-10-CM

## 2022-11-03 DIAGNOSIS — F17.200 NICOTINE DEPENDENCE, UNCOMPLICATED, UNSPECIFIED NICOTINE PRODUCT TYPE: ICD-10-CM

## 2022-11-03 DIAGNOSIS — C83.50 T LYMPHOBLASTIC LYMPHOMA (H): ICD-10-CM

## 2022-11-03 DIAGNOSIS — Z79.899 IMMUNOSUPPRESSED DUE TO CHEMOTHERAPY (H): Primary | ICD-10-CM

## 2022-11-03 DIAGNOSIS — E24.2 CUSHINGOID SIDE EFFECT OF STEROIDS (H): ICD-10-CM

## 2022-11-03 DIAGNOSIS — D84.821 IMMUNOSUPPRESSED DUE TO CHEMOTHERAPY (H): Primary | ICD-10-CM

## 2022-11-03 DIAGNOSIS — E66.3 OVERWEIGHT: ICD-10-CM

## 2022-11-03 DIAGNOSIS — Z23 NEED FOR VACCINATION: ICD-10-CM

## 2022-11-03 DIAGNOSIS — T45.1X5A IMMUNOSUPPRESSED DUE TO CHEMOTHERAPY (H): Primary | ICD-10-CM

## 2022-11-03 PROCEDURE — 90472 IMMUNIZATION ADMIN EACH ADD: CPT | Performed by: PHYSICIAN ASSISTANT

## 2022-11-03 PROCEDURE — 90715 TDAP VACCINE 7 YRS/> IM: CPT | Performed by: PHYSICIAN ASSISTANT

## 2022-11-03 PROCEDURE — 99202 OFFICE O/P NEW SF 15 MIN: CPT | Mod: 25 | Performed by: PHYSICIAN ASSISTANT

## 2022-11-03 PROCEDURE — 90471 IMMUNIZATION ADMIN: CPT | Performed by: PHYSICIAN ASSISTANT

## 2022-11-03 PROCEDURE — 90677 PCV20 VACCINE IM: CPT | Performed by: PHYSICIAN ASSISTANT

## 2022-11-03 PROCEDURE — 90713 POLIOVIRUS IPV SC/IM: CPT | Performed by: PHYSICIAN ASSISTANT

## 2022-11-03 PROCEDURE — 90710 MMRV VACCINE SC: CPT | Performed by: PHYSICIAN ASSISTANT

## 2022-11-03 ASSESSMENT — PAIN SCALES - GENERAL: PAINLEVEL: NO PAIN (0)

## 2022-11-03 NOTE — PROGRESS NOTES
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  Would have him return in 1 month to complete HPV, COVID and Menactra.    Nicotine dependence, uncomplicated, unspecified nicotine product type  - [place] Quit Partner Referral; Future    Need for vaccination  See above.        Nicotine/Tobacco Cessation:  He reports that he has been smoking cigarettes. He has never used smokeless tobacco.  Nicotine/Tobacco Cessation Plan:   Pharmacotherapies : other Have advised to quit plan approach.      BMI:   Estimated body mass index is 26.48 kg/m  as calculated from the following:    Height as of 10/7/22: 1.837 m (6' 0.32\").    Weight as of this encounter: 89.4 kg (197 lb).   Weight management plan: Discussed healthy diet and exercise guidelines    Work on weight loss  Regular exercise  Return for Physical Exam, Medication Recheck, Lab Work.    Lemuel Palmer PA-C  [facility]    Lissette Willis is a 22 year old presenting for the following health issues:  Follow up       HPI     Follow up from his cancer treatment and also get updated on all his vaccines he needs     Review of Systems   Constitutional, HEENT, cardiovascular, pulmonary, GI, , musculoskeletal, neuro, skin, endocrine and psych systems are negative, except as otherwise noted.      Objective    /68 (BP Location: Right arm, Patient Position: Sitting, Cuff Size: Adult Regular)   Pulse 70   Temp 97.8  F (36.6  C) (Temporal)   Resp 14   Wt 89.4 kg (197 lb)   SpO2 99%   BMI 26.48 kg/m    Body mass index is 26.48 kg/m .  Physical Exam   GENERAL: healthy, alert and no distress  NECK: no adenopathy, no asymmetry, masses, or scars and thyroid normal to palpation  RESP: lungs clear to auscultation - no rales, rhonchi or " wheezes  CV: regular rate and rhythm, normal S1 S2, no S3 or S4, no murmur, click or rub, no peripheral edema and peripheral pulses strong  ABDOMEN: soft, nontender, no hepatosplenomegaly, no masses and bowel sounds normal  MS: no gross musculoskeletal defects noted, no edema    No results found for this or any previous visit (from the past 24 hour(s)).

## 2022-11-03 NOTE — PATIENT INSTRUCTIONS
How to Quit Smoking  Smoking is a hard habit to break. About 50% of all people who have ever smoked have been able to quit. Most people who still smoke want to quit. Here are some of the best ways to stop smoking.     Keep in mind the health benefits of quitting  The health benefits of quitting start right away. They keep improving the longer you go without smoking. Knowing this can help inspire you to stay on track. These benefits occur at any age. If you are 17 or 70, quitting is a good choice. Some of the health benefits after your last cigarette include:     After 20 minutes: Your blood pressure and pulse return to normal.    After 8 hours: Your oxygen levels return to normal.    After 2 days: Your ability to smell and taste start to improve as damaged nerves regrow.    After 2 to 3 weeks: Your circulation and lung function improve.    After 1 to 9 months: Your coughing, congestion, and shortness of breath decrease. Your tiredness decreases.    After 1 year: Your risk of heart attack decreases by 50%.    After 5 years: Your risk of lung cancer decreases by 50%. Your risk of stroke becomes the same as a nonsmoker s.  Go cold turkey  Most former smokers quit cold turkey. This means stopping all at once. Trying to cut back slowly often doesn't work as well. This may be because it continues the habit of smoking. Also you may inhale more smoke while smoking fewer cigarettes. This leads to the same amount of nicotine in your body.   Get support  Support programs can be a big help, especially for heavy smokers. These groups offer lectures, ways to change behavior, and peer support. Here are some ways to find a support program:     Free national quitline 800-QUIT-NOW (602-609-5105)    [facility] quit-smoking programs    American Lung Association 846-222-5737    American Cancer Society 060-707-8734  Support at home is important too. Family and friends can offer praise and reassurance. If the smoker in your life finds  it hard to quit, encourage them to keep trying.   Try over-the-counter medicine  Nicotine replacement therapy may make it easier to quit. Some aids are available without a prescription. These include a nicotine patch, gum, and lozenges. But it's best to use these under the care of your healthcare provider. The skin patch gives a steady supply of nicotine. Nicotine gum and lozenges give short-time doses of low levels of nicotine. Both methods reduce the craving for cigarettes. If you have nausea, vomiting, dizziness, weakness, or a fast heartbeat, stop using these products. See your provider.   Ask about prescription medicine  After reviewing your smoking patterns and past attempts to quit, your healthcare provider may offer a prescription medicine such as bupropion, varenicline, a nicotine inhaler, or nasal spray. Each has advantages and side effects. Your provider can review these with you.   Keep trying  Most smokers make many attempts at quitting before they succeed. It s important not to give up.   To learn more  For more on how to quit smoking, try these resources:     www.cdc.gov/tobacco/quit_smoking/ 800-QUIT-NOW (911-875-5878)    www.smokefree.gov 877-44U-QUIT (868-019-4280)    www.lung.org/stop-smoking/ 800-LUNGUSA (903-408-7795)  [employer] last reviewed this educational content on 12/1/2019 2000-2021 [employer]. All rights reserved. This information is not intended as a substitute for professional medical care. Always follow your healthcare professional's instructions.

## 2022-11-08 ENCOUNTER — OFFICE VISIT (OUTPATIENT)
Dept: PEDIATRIC HEMATOLOGY/ONCOLOGY | Facility: CLINIC | Age: 22
End: 2022-11-08
Attending: PEDIATRICS
Payer: COMMERCIAL

## 2022-11-08 VITALS
TEMPERATURE: 97.9 F | RESPIRATION RATE: 20 BRPM | HEART RATE: 83 BPM | OXYGEN SATURATION: 99 % | SYSTOLIC BLOOD PRESSURE: 113 MMHG | WEIGHT: 194 LBS | HEIGHT: 73 IN | BODY MASS INDEX: 25.71 KG/M2 | DIASTOLIC BLOOD PRESSURE: 60 MMHG

## 2022-11-08 DIAGNOSIS — C83.50 T LYMPHOBLASTIC LYMPHOMA (H): Primary | ICD-10-CM

## 2022-11-08 LAB
BASOPHILS # BLD AUTO: 0 10E3/UL (ref 0–0.2)
BASOPHILS NFR BLD AUTO: 1 %
EOSINOPHIL # BLD AUTO: 0.2 10E3/UL (ref 0–0.7)
EOSINOPHIL NFR BLD AUTO: 5 %
ERYTHROCYTE [DISTWIDTH] IN BLOOD BY AUTOMATED COUNT: 12.2 % (ref 10–15)
HCT VFR BLD AUTO: 42.9 % (ref 40–53)
HGB BLD-MCNC: 14.6 G/DL (ref 13.3–17.7)
HOLD SPECIMEN: NORMAL
IMM GRANULOCYTES # BLD: 0 10E3/UL
IMM GRANULOCYTES NFR BLD: 0 %
LYMPHOCYTES # BLD AUTO: 1 10E3/UL (ref 0.8–5.3)
LYMPHOCYTES NFR BLD AUTO: 28 %
MCH RBC QN AUTO: 29.5 PG (ref 26.5–33)
MCHC RBC AUTO-ENTMCNC: 34 G/DL (ref 31.5–36.5)
MCV RBC AUTO: 87 FL (ref 78–100)
MONOCYTES # BLD AUTO: 0.3 10E3/UL (ref 0–1.3)
MONOCYTES NFR BLD AUTO: 8 %
NEUTROPHILS # BLD AUTO: 2.1 10E3/UL (ref 1.6–8.3)
NEUTROPHILS NFR BLD AUTO: 58 %
NRBC # BLD AUTO: 0 10E3/UL
NRBC BLD AUTO-RTO: 0 /100
PLATELET # BLD AUTO: 225 10E3/UL (ref 150–450)
RBC # BLD AUTO: 4.95 10E6/UL (ref 4.4–5.9)
WBC # BLD AUTO: 3.7 10E3/UL (ref 4–11)

## 2022-11-08 PROCEDURE — 36415 COLL VENOUS BLD VENIPUNCTURE: CPT | Performed by: PEDIATRICS

## 2022-11-08 PROCEDURE — 85025 COMPLETE CBC W/AUTO DIFF WBC: CPT | Performed by: PEDIATRICS

## 2022-11-08 PROCEDURE — G0463 HOSPITAL OUTPT CLINIC VISIT: HCPCS

## 2022-11-08 PROCEDURE — 99214 OFFICE O/P EST MOD 30 MIN: CPT | Performed by: PEDIATRICS

## 2022-11-08 ASSESSMENT — PAIN SCALES - GENERAL: PAINLEVEL: NO PAIN (0)

## 2022-11-08 NOTE — LETTER
11/8/2022      RE: Lazaro Lund  [address] [id number]     Dear Colleague,    Thank you for the opportunity to participate in the care of your patient, Lazaro Lund, at the [facility] at [facility]. Please see a copy of my visit note below.    Pediatric Hematology/Oncology Clinic Note     Lazaro Lund is a 22 year old young man with a history of T-cell lymphoblastic lymphoma. Lazaro presented with acute onset cough and was found to have an anterior mediastinal mass and malignant left-sided pleural effusion.  A CT guided biopsy was obtained at [facility] and pathology was consistent with T-cell leukemia vs lymphoma.  He was admitted to [facility] oncology service 8/30 and started on treatment per [id number].  He had a pleural effusion that required a chest tube and a pericardial effusion that was not drained. His Induction was complicated by cardiac compression secondary to his mass leading to tamponade, this improved through out his hospital stay.  He also had dysphagia that improved with treatment of his mass, swallow study was normal. His course was complicated by extensive bilateral UE DVTs, and he was successfully treated with anticoagulation. His consolidation course was complicated by the development of severe asparaginase induced necrotizing pancreatitis. He became quite ill with SIRS and associated hypotension, he required pressor support in the ICU. As a result, asparaginase was eliminated from his treatment plan. He was in CR status at end of consolidation. During maintenance, he developed hepatotoxicity so allopurinol and dose reduced 6MP were started for correction of skewed 6-MP metabolism.  Lazaro was treated per COG protocol [id number] and completed therapy on 12/23/21, he comes to clinic today for an off therapy visit.    HPI:   Lazaro has done great since his last visit. Energy is good.  "Eating and drinking well with no GI upset. He is having soft, regular stools. No abdominal discomfort. No new rashes or skin concerns. No headaches, dizziness, or lightheadedness. No bruising or petechiae. Denies recent fever or other acute ill symptoms.    Lazaro established care with a PCP and received the start of his re-vaccinations. No other questions or concerns today.    Review of systems:  The 10 point Review of Systems is negative other than noted in the HPI or here.      PMH:   Past Medical History:   Diagnosis Date     Acute necrotizing pancreatitis 11/07/2019    attributed to asparaginase     Acute pancreatitis due to PEGaspariginase therapy  11/15/2019     DVT of upper extremity (deep vein thrombosis) (H) 09/26/2019    Bilateral      Edema of upper extremity 10/17/2019     Folliculitis 10/17/2019     Migraine 2006    have resolved     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   -- Spinal HA following LP  -- TPMT shows Intermediate activity with normal TPMT/NUDT15 genotype  -- Factor V leiden and prothrombin gene mutation negative  -- Necrotizing pancreatitis secondary to asparaginase  -- Former smoker, quit with the use of nicotine patches. Former daily marajuana smoker, now only uses occasionally  -- Long standing right sided gynecomastia that predated his lymphoma diagnosis.    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  He continues to live with 2 of his friends with whom he purchased a house. He also recently got a puppy.    Current Medications:  No medications at this time.     Physical Exam:   /60   Pulse 83   Temp 97.9  F (36.6  C) (Oral)   Resp 20   Ht 1.844 m (6' 0.6\")   Wt 88 kg (194 lb 0.1 oz)   SpO2 99%   BMI " "25.88 kg/m      Wt Readings from Last 4 Encounters:   11/08/22 88 kg (194 lb 0.1 oz)   11/03/22 89.4 kg (197 lb)   10/07/22 85.5 kg (188 lb 7.9 oz)   09/09/22 84.7 kg (186 lb 11.7 oz)     Ht Readings from Last 2 Encounters:   11/08/22 1.844 m (6' 0.6\")   10/07/22 1.837 m (6' 0.32\")     General: Lazaro is alert, interactive and appropriate, well-appearing, in no distress  HEENT: Skull is atrauamatic and normocephalic.  Full head of hair. PERRLA, sclera are non icteric and not injected, EOM are intact. Nares are patent without drainage. Posterior oropharynx with small amount of mucosal streaking. No erythema or white plaques. No mucositis. MMM.  Neck:  Supple without lymphadenopathy.   Lymph: There is no cervical, supraclavicular, axillary, lymphadenopathy palpated.  Cardiovascular/chest:  Right sided gynecomastia. HR is regular, S1, S2 no murmur.  Capillary refill is < 2 seconds.   Respiratory: No cough noted. Respirations are easy. Lungs are clear to auscultation throughout.  No crackles or wheezes.  Gastrointestinal: BS present in all quadrants.  Abdomen is soft and flat.  No pain with palpation. No hepatosplenomegaly or masses are palpated.  Skin: No rash. Old port site is c/d/i, scar is wide.  Neurological:  CN 2-12 grossly intact. Gait is normal.  No issues with balance. Sensation intact in hands and feet.     Musculoskeletal: Good strength and ROM in all extremities.  5/5 strength with dorsiflexion and plantarflexion at ankles, and with  strength. No tenderness of lower back with palpation.     Labs:   The following tests were ordered and interpreted by me today:  -- CBC with differential    Results for orders placed or performed in visit on 11/08/22   CBC with platelets and differential     Status: Abnormal   Result Value Ref Range    WBC Count 3.7 (L) 4.0 - 11.0 10e3/uL    RBC Count 4.95 4.40 - 5.90 10e6/uL    Hemoglobin 14.6 13.3 - 17.7 g/dL    Hematocrit 42.9 40.0 - 53.0 %    MCV 87 78 - 100 fL    MCH " 29.5 26.5 - 33.0 pg    MCHC 34.0 31.5 - 36.5 g/dL    RDW 12.2 10.0 - 15.0 %    Platelet Count 225 150 - 450 10e3/uL    % Neutrophils 58 %    % Lymphocytes 28 %    % Monocytes 8 %    % Eosinophils 5 %    % Basophils 1 %    % Immature Granulocytes 0 %    NRBCs per 100 WBC 0 <1 /100    Absolute Neutrophils 2.1 1.6 - 8.3 10e3/uL    Absolute Lymphocytes 1.0 0.8 - 5.3 10e3/uL    Absolute Monocytes 0.3 0.0 - 1.3 10e3/uL    Absolute Eosinophils 0.2 0.0 - 0.7 10e3/uL    Absolute Basophils 0.0 0.0 - 0.2 10e3/uL    Absolute Immature Granulocytes 0.0 <=0.4 10e3/uL    Absolute NRBCs 0.0 10e3/uL   Extra Tube     Status: None (In process)    Narrative    The following orders were created for panel order Extra Tube.  Procedure                               Abnormality         Status                     ---------                               -----------         ------                     Extra Green Top (Lithium...[[id number]]                      In process                   Please view results for these tests on the individual orders.   CBC with platelets differential     Status: Abnormal    Narrative    The following orders were created for panel order CBC with platelets differential.  Procedure                               Abnormality         Status                     ---------                               -----------         ------                     CBC with platelets and d...[[id number]]  Abnormal            Final result                 Please view results for these tests on the individual orders.        Assessment:  Lazaro Lund is a 22 year old young man with a history of T cell lymphoblastic lymphoma (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  He was treated per COG protocol [id number]. He had a CRu following Induction with a CR at the end of Consolidation. His course was complicated by extensive bilateral DVT and severe necrotizing pancreatitis requiring cessation of PEG-asparaginase from his treatment.  He completed therapy on  12/23/21 and comes to clinic today for an off therapy visit.  He is doing very well. CBC only notable for leukopenia without decrease in any specific WBC line. We will continue to follow.     Plan:   1) Labs reviewed with Lazaro, all cell lines look good, but discussed monitoring his new mild leukopenia.  2) Previously reviewed vaccine titers and received vaccination since his last visit with his PCP. Lazaro is still immune to: HIB, tetanus, Hep A, Rubeola, Mumps.  He is NOT immune to: polio, diptheria, rubella, pneumococcus, Hep B or varicella. Also recommend HPV vaccine and COVID vaccine since he has not received either.  3) End of therapy echo done on 1/7/22.  Total anthracycline dose= 175mg/m2, he will need echos every 5 years (2027)  4) Continue to recommend that he take Vit D 1000 IU daily (although he is not taking any medications)  5) Discussed fertility previously. Lazaro did sperm bank but elected not to keep the specimen. He should assume that he is fertile, offered repeat semen analysis in the future if he is interested.  Would ideally wait until he is 1 year off therapy.  6) Recommend he have gynecomastia evaluated with primary care when he establishes care.  7) RTC in 1 month for follow up visit and labs.    Reynaldo Campuzano MD  Pediatric Hematology/Oncology  [facility]  Pager [phone]    Total time spent on the following services on the date of the encounter:  -- Preparing to see patient  -- Interpretation of labs  -- Performing a medically appropriate examination  -- Counseling and educating the patient/family/caregiver  -- Documenting clinical information in the electronic health record  -- Communicating results to the patient/family/caregiver  -- Total time spent: 30 minutes

## 2022-11-08 NOTE — PROGRESS NOTES
Pediatric Hematology/Oncology Clinic Note     Lazaro Lund is a 22 year old young man with a history of T-cell lymphoblastic lymphoma. Lazaro presented with acute onset cough and was found to have an anterior mediastinal mass and malignant left-sided pleural effusion.  A CT guided biopsy was obtained at [facility] and pathology was consistent with T-cell leukemia vs lymphoma.  He was admitted to [facility] oncology service 8/30 and started on treatment per [id number].  He had a pleural effusion that required a chest tube and a pericardial effusion that was not drained. His Induction was complicated by cardiac compression secondary to his mass leading to tamponade, this improved through out his hospital stay.  He also had dysphagia that improved with treatment of his mass, swallow study was normal. His course was complicated by extensive bilateral UE DVTs, and he was successfully treated with anticoagulation. His consolidation course was complicated by the development of severe asparaginase induced necrotizing pancreatitis. He became quite ill with SIRS and associated hypotension, he required pressor support in the ICU. As a result, asparaginase was eliminated from his treatment plan. He was in CR status at end of consolidation. During maintenance, he developed hepatotoxicity so allopurinol and dose reduced 6MP were started for correction of skewed 6-MP metabolism.  Lazaro was treated per [facility] protocol [id number] and completed therapy on 12/23/21, he comes to clinic today for an off therapy visit.    HPI:   Lazaro has done great since his last visit. Energy is good. Eating and drinking well with no GI upset. He is having soft, regular stools. No abdominal discomfort. No new rashes or skin concerns. No headaches, dizziness, or lightheadedness. No bruising or petechiae. Denies recent fever or other acute ill symptoms.    Lazaro established care with a PCP and received the start of his re-vaccinations. No other questions or  "concerns today.    Review of systems:  The 10 point Review of Systems is negative other than noted in the HPI or here.      PMH:   Past Medical History:   Diagnosis Date     Acute necrotizing pancreatitis 11/07/2019    attributed to asparaginase     Acute pancreatitis due to PEGaspariginase therapy  11/15/2019     DVT of upper extremity (deep vein thrombosis) (H) 09/26/2019    Bilateral      Edema of upper extremity 10/17/2019     Folliculitis 10/17/2019     Migraine 2006    have resolved     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   -- Spinal HA following LP  -- TPMT shows Intermediate activity with normal TPMT/NUDT15 genotype  -- Factor V leiden and prothrombin gene mutation negative  -- Necrotizing pancreatitis secondary to asparaginase  -- Former smoker, quit with the use of nicotine patches. Former daily marajuana smoker, now only uses occasionally  -- Long standing right sided gynecomastia that predated his lymphoma diagnosis.    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  He continues to live with 2 of his friends with whom he purchased a house. He also recently got a puppy.    Current Medications:  No medications at this time.     Physical Exam:   /60   Pulse 83   Temp 97.9  F (36.6  C) (Oral)   Resp 20   Ht 1.844 m (6' 0.6\")   Wt 88 kg (194 lb 0.1 oz)   SpO2 99%   BMI 25.88 kg/m      Wt Readings from Last 4 Encounters:   11/08/22 88 kg (194 lb 0.1 oz)   11/03/22 89.4 kg (197 lb)   10/07/22 85.5 kg (188 lb 7.9 oz)   09/09/22 84.7 kg (186 lb 11.7 oz)     Ht Readings from Last 2 Encounters:   11/08/22 1.844 m (6' 0.6\")   10/07/22 1.837 m (6' 0.32\")     General: Lazaro is alert, interactive and appropriate, well-appearing, in no " distress  HEENT: Skull is atrauamatic and normocephalic.  Full head of hair. PERRLA, sclera are non icteric and not injected, EOM are intact. Nares are patent without drainage. Posterior oropharynx with small amount of mucosal streaking. No erythema or white plaques. No mucositis. MMM.  Neck:  Supple without lymphadenopathy.   Lymph: There is no cervical, supraclavicular, axillary, lymphadenopathy palpated.  Cardiovascular/chest:  Right sided gynecomastia. HR is regular, S1, S2 no murmur.  Capillary refill is < 2 seconds.   Respiratory: No cough noted. Respirations are easy. Lungs are clear to auscultation throughout.  No crackles or wheezes.  Gastrointestinal: BS present in all quadrants.  Abdomen is soft and flat.  No pain with palpation. No hepatosplenomegaly or masses are palpated.  Skin: No rash. Old port site is c/d/i, scar is wide.  Neurological:  CN 2-12 grossly intact. Gait is normal.  No issues with balance. Sensation intact in hands and feet.     Musculoskeletal: Good strength and ROM in all extremities.  5/5 strength with dorsiflexion and plantarflexion at ankles, and with  strength. No tenderness of lower back with palpation.     Labs:   The following tests were ordered and interpreted by me today:  -- CBC with differential    Results for orders placed or performed in visit on 11/08/22   CBC with platelets and differential     Status: Abnormal   Result Value Ref Range    WBC Count 3.7 (L) 4.0 - 11.0 10e3/uL    RBC Count 4.95 4.40 - 5.90 10e6/uL    Hemoglobin 14.6 13.3 - 17.7 g/dL    Hematocrit 42.9 40.0 - 53.0 %    MCV 87 78 - 100 fL    MCH 29.5 26.5 - 33.0 pg    MCHC 34.0 31.5 - 36.5 g/dL    RDW 12.2 10.0 - 15.0 %    Platelet Count 225 150 - 450 10e3/uL    % Neutrophils 58 %    % Lymphocytes 28 %    % Monocytes 8 %    % Eosinophils 5 %    % Basophils 1 %    % Immature Granulocytes 0 %    NRBCs per 100 WBC 0 <1 /100    Absolute Neutrophils 2.1 1.6 - 8.3 10e3/uL    Absolute Lymphocytes 1.0 0.8 - 5.3  10e3/uL    Absolute Monocytes 0.3 0.0 - 1.3 10e3/uL    Absolute Eosinophils 0.2 0.0 - 0.7 10e3/uL    Absolute Basophils 0.0 0.0 - 0.2 10e3/uL    Absolute Immature Granulocytes 0.0 <=0.4 10e3/uL    Absolute NRBCs 0.0 10e3/uL   Extra Tube     Status: None (In process)    Narrative    The following orders were created for panel order Extra Tube.  Procedure                               Abnormality         Status                     ---------                               -----------         ------                     Extra Green Top (Lithium...[[id number]]                      In process                   Please view results for these tests on the individual orders.   CBC with platelets differential     Status: Abnormal    Narrative    The following orders were created for panel order CBC with platelets differential.  Procedure                               Abnormality         Status                     ---------                               -----------         ------                     CBC with platelets and d...[[id number]]  Abnormal            Final result                 Please view results for these tests on the individual orders.        Assessment:  Lazaro Lund is a 22 year old young man with a history of T cell lymphoblastic lymphoma (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  He was treated per [facility] protocol [id number]. He had a CRu following Induction with a CR at the end of Consolidation. His course was complicated by extensive bilateral DVT and severe necrotizing pancreatitis requiring cessation of PEG-asparaginase from his treatment.  He completed therapy on 12/23/21 and comes to clinic today for an off therapy visit.  He is doing very well. CBC only notable for leukopenia without decrease in any specific WBC line. We will continue to follow.     Plan:   1) Labs reviewed with Lazaro, all cell lines look good, but discussed monitoring his new mild leukopenia.  2) Previously reviewed vaccine titers and received vaccination  since his last visit with his PCP. Lazaro is still immune to: HIB, tetanus, Hep A, Rubeola, Mumps.  He is NOT immune to: polio, diptheria, rubella, pneumococcus, Hep B or varicella. Also recommend HPV vaccine and COVID vaccine since he has not received either.  3) End of therapy echo done on 1/7/22.  Total anthracycline dose= 175mg/m2, he will need echos every 5 years (2027)  4) Continue to recommend that he take Vit D 1000 IU daily (although he is not taking any medications)  5) Discussed fertility previously. Lazaro did sperm bank but elected not to keep the specimen. He should assume that he is fertile, offered repeat semen analysis in the future if he is interested.  Would ideally wait until he is 1 year off therapy.  6) Recommend he have gynecomastia evaluated with primary care when he establishes care.  7) RTC in 1 month for follow up visit and labs.    Reynaldo Campuzano MD  Pediatric Hematology/Oncology  [facility]  Pager [phone]    Total time spent on the following services on the date of the encounter:  -- Preparing to see patient  -- Interpretation of labs  -- Performing a medically appropriate examination  -- Counseling and educating the patient/family/caregiver  -- Documenting clinical information in the electronic health record  -- Communicating results to the patient/family/caregiver  -- Total time spent: 30 minutes

## 2022-11-08 NOTE — NURSING NOTE
"Chief Complaint   Patient presents with     Follow Up     Exam and Labs     /60   Pulse 83   Temp 97.9  F (36.6  C) (Oral)   Resp 20   Ht 1.844 m (6' 0.6\")   Wt 88 kg (194 lb 0.1 oz)   SpO2 99%   BMI 25.88 kg/m      No Pain (0)  Data Unavailable    I have reviewed the patients medications and allergies    Height/weight double check needed? No    Peds Outpatient BP  1) Rested for 5 minutes, BP taken on bare arm, patient sitting (or supine for infants) w/ legs uncrossed?   Yes  2) Right arm used?      Yes  3) Arm circumference of largest part of upper arm (in cm):    4) BP cuff sized used: Large Adult (32-43cm)   If used different size cuff then what was recommended why? N/A  5) First BP reading:machine   BP Readings from Last 1 Encounters:   11/08/22 113/60      Is reading >90%?No   (90% for <1 years is 90/50)  (90% for >18 years is 140/90)  *If a machine BP is at or above 90% take manual BP  6) Manual BP reading: N/A  7) Other comments: None          Sandrita Ruiz, [facility]  November 8, 2022  "

## 2022-12-06 ENCOUNTER — OFFICE VISIT (OUTPATIENT)
Dept: PEDIATRIC HEMATOLOGY/ONCOLOGY | Facility: CLINIC | Age: 22
End: 2022-12-06
Attending: NURSE PRACTITIONER
Payer: COMMERCIAL

## 2022-12-06 VITALS
OXYGEN SATURATION: 98 % | DIASTOLIC BLOOD PRESSURE: 68 MMHG | HEIGHT: 72 IN | TEMPERATURE: 98.5 F | WEIGHT: 192.02 LBS | RESPIRATION RATE: 18 BRPM | SYSTOLIC BLOOD PRESSURE: 123 MMHG | BODY MASS INDEX: 26.01 KG/M2 | HEART RATE: 88 BPM

## 2022-12-06 DIAGNOSIS — C83.50 T LYMPHOBLASTIC LYMPHOMA (H): ICD-10-CM

## 2022-12-06 LAB
BASOPHILS # BLD AUTO: 0 10E3/UL (ref 0–0.2)
BASOPHILS NFR BLD AUTO: 0 %
EOSINOPHIL # BLD AUTO: 0.1 10E3/UL (ref 0–0.7)
EOSINOPHIL NFR BLD AUTO: 2 %
ERYTHROCYTE [DISTWIDTH] IN BLOOD BY AUTOMATED COUNT: 11.6 % (ref 10–15)
HCT VFR BLD AUTO: 41.4 % (ref 40–53)
HGB BLD-MCNC: 14.5 G/DL (ref 13.3–17.7)
IMM GRANULOCYTES # BLD: 0 10E3/UL
IMM GRANULOCYTES NFR BLD: 0 %
LYMPHOCYTES # BLD AUTO: 1.3 10E3/UL (ref 0.8–5.3)
LYMPHOCYTES NFR BLD AUTO: 22 %
MCH RBC QN AUTO: 29.7 PG (ref 26.5–33)
MCHC RBC AUTO-ENTMCNC: 35 G/DL (ref 31.5–36.5)
MCV RBC AUTO: 85 FL (ref 78–100)
MONOCYTES # BLD AUTO: 0.7 10E3/UL (ref 0–1.3)
MONOCYTES NFR BLD AUTO: 11 %
NEUTROPHILS # BLD AUTO: 4 10E3/UL (ref 1.6–8.3)
NEUTROPHILS NFR BLD AUTO: 65 %
NRBC # BLD AUTO: 0 10E3/UL
NRBC BLD AUTO-RTO: 0 /100
PLATELET # BLD AUTO: 266 10E3/UL (ref 150–450)
RBC # BLD AUTO: 4.89 10E6/UL (ref 4.4–5.9)
WBC # BLD AUTO: 6.2 10E3/UL (ref 4–11)

## 2022-12-06 PROCEDURE — 85025 COMPLETE CBC W/AUTO DIFF WBC: CPT | Performed by: NURSE PRACTITIONER

## 2022-12-06 PROCEDURE — G0463 HOSPITAL OUTPT CLINIC VISIT: HCPCS

## 2022-12-06 PROCEDURE — 99214 OFFICE O/P EST MOD 30 MIN: CPT | Performed by: NURSE PRACTITIONER

## 2022-12-06 PROCEDURE — 36415 COLL VENOUS BLD VENIPUNCTURE: CPT | Performed by: NURSE PRACTITIONER

## 2022-12-06 ASSESSMENT — PAIN SCALES - GENERAL: PAINLEVEL: NO PAIN (0)

## 2022-12-06 NOTE — LETTER
12/6/2022       RE: Lazaro Lund  [id number] [address] [address]     Dear Colleague,    Thank you for the opportunity to participate in the care of your patient, Lazaro Lund, at the [facility] at [facility]. Please see a copy of my visit note below.    Pediatric Hematology/Oncology Clinic Note     Lazaro Lund is a 22 year old young man with a history of T-cell lymphoblastic lymphoma. Lazaro presented with acute onset cough and was found to have an anterior mediastinal mass and malignant left-sided pleural effusion.  A CT guided biopsy was obtained at [facility] and pathology was consistent with T-cell leukemia vs lymphoma.  He was admitted to [facility] oncology service 8/30 and started on treatment per [id number].  He had a pleural effusion that required a chest tube and a pericardial effusion that was not drained. His Induction was complicated by cardiac compression secondary to his mass leading to tamponade, this improved through out his hospital stay.  He also had dysphagia that improved with treatment of his mass, swallow study was normal. His course was complicated by extensive bilateral UE DVTs, and he was successfully treated with anticoagulation. His consolidation course was complicated by the development of severe asparaginase induced necrotizing pancreatitis. He became quite ill with SIRS and associated hypotension, he required pressor support in the ICU. As a result, asparaginase was eliminated from his treatment plan. He was in CR status at end of consolidation. During maintenance, he developed hepatotoxicity so allopurinol and dose reduced 6MP were started for correction of skewed 6-MP metabolism.  Lazaro was treated per [facility] protocol [id number] and completed therapy on 12/23/21, he comes to clinic today for an off therapy visit.    HPI:   Lazaro has been doing well since his last visit. He has been  able to re-establish primary care and start his vaccinations.  His health has been good.  He has mild congestion that started a few days ago, had one headache associated with this.  Does not have cough.  Is vaping, has some interest in quitting, his roommate is hoping to quit as well so he hopes to do it together.    Lazaro denies any pain.  No skin concerns.  He is eating well, no GI upset, tolerates high fat meals without difficulty.  He has not had fever.      Review of systems:  The 10 point Review of Systems is negative other than noted in the HPI or here.      PMH:   Past Medical History:   Diagnosis Date     Acute necrotizing pancreatitis 11/07/2019    attributed to asparaginase     Acute pancreatitis due to PEGaspariginase therapy  11/15/2019     DVT of upper extremity (deep vein thrombosis) (H) 09/26/2019    Bilateral      Edema of upper extremity 10/17/2019     Folliculitis 10/17/2019     Migraine 2006    have resolved     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   -- Spinal HA following LP  -- TPMT shows Intermediate activity with normal TPMT/NUDT15 genotype  -- Factor V leiden and prothrombin gene mutation negative  -- Necrotizing pancreatitis secondary to asparaginase  -- Former smoker, quit with the use of nicotine patches. Former daily marajuana smoker, now only uses occasionally  -- Long standing right sided gynecomastia that predated his lymphoma diagnosis.    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  He continues to live with 2 of his friends with whom he purchased a house. He also recently got a puppy, Rottweiler and English Bulldog mix named Eliot.    Current Medications:  No medications at this time.     Physical Exam:  "  Temp:  [98.5  F (36.9  C)] 98.5  F (36.9  C)  Pulse:  [88] 88  Resp:  [18] 18  BP: (123)/(68) 123/68  SpO2:  [98 %] 98 %  Wt Readings from Last 4 Encounters:   12/06/22 87.1 kg (192 lb 0.3 oz)   11/08/22 88 kg (194 lb 0.1 oz)   11/03/22 89.4 kg (197 lb)   10/07/22 85.5 kg (188 lb 7.9 oz)     Ht Readings from Last 2 Encounters:   12/06/22 1.833 m (6' 0.17\")   11/08/22 1.844 m (6' 0.6\")     General: Lazaro is alert, interactive and appropriate, well-appearing, in no distress  HEENT: Skull is atrauamatic and normocephalic.  Full head of hair. PERRLA, sclera are non icteric and not injected, EOM are intact. Nares are patent with mild rhinorrhea. No sinus tenderness. Posterior oropharynx clear. No erythema or white plaques. No mucositis. MMM.  Neck:  Supple without lymphadenopathy.   Lymph: There is no cervical, supraclavicular, axillary, lymphadenopathy palpated.  Cardiovascular/chest:  Right sided gynecomastia. HR is regular, S1, S2 no murmur.  Capillary refill is < 2 seconds.   Respiratory: No cough noted. Respirations are easy. Lungs are clear to auscultation throughout.  No crackles or wheezes.  Gastrointestinal: BS present in all quadrants.  Abdomen is soft and flat.  No pain with palpation. No hepatosplenomegaly or masses are palpated.  Skin: No rash. Old port site is c/d/i, scar is wide.  Neurological:  CN 2-12 grossly intact. Gait is normal.  No issues with balance. Sensation intact in hands and feet.     Musculoskeletal: Good strength and ROM in all extremities.  5/5 strength with dorsiflexion and plantarflexion at ankles, and with  strength. No tenderness of lower back with palpation.     Labs:   Results for orders placed or performed in visit on 12/06/22   CBC with platelets and differential     Status: None   Result Value Ref Range    WBC Count 6.2 4.0 - 11.0 10e3/uL    RBC Count 4.89 4.40 - 5.90 10e6/uL    Hemoglobin 14.5 13.3 - 17.7 g/dL    Hematocrit 41.4 40.0 - 53.0 %    MCV 85 78 - 100 fL    MCH " 29.7 26.5 - 33.0 pg    MCHC 35.0 31.5 - 36.5 g/dL    RDW 11.6 10.0 - 15.0 %    Platelet Count 266 150 - 450 10e3/uL    % Neutrophils 65 %    % Lymphocytes 22 %    % Monocytes 11 %    % Eosinophils 2 %    % Basophils 0 %    % Immature Granulocytes 0 %    NRBCs per 100 WBC 0 <1 /100    Absolute Neutrophils 4.0 1.6 - 8.3 10e3/uL    Absolute Lymphocytes 1.3 0.8 - 5.3 10e3/uL    Absolute Monocytes 0.7 0.0 - 1.3 10e3/uL    Absolute Eosinophils 0.1 0.0 - 0.7 10e3/uL    Absolute Basophils 0.0 0.0 - 0.2 10e3/uL    Absolute Immature Granulocytes 0.0 <=0.4 10e3/uL    Absolute NRBCs 0.0 10e3/uL   CBC with Platelets & Differential     Status: None    Narrative    The following orders were created for panel order CBC with Platelets & Differential.  Procedure                               Abnormality         Status                     ---------                               -----------         ------                     CBC with platelets and d...[[id number]]                      Final result                 Please view results for these tests on the individual orders.       Assessment:  Lazaro Lund is a 22 year old young man with a history of T cell lymphoblastic lymphoma (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  He was treated per [facility] protocol [id number]. He had a CRu following Induction with a CR at the end of Consolidation. His course was complicated by extensive bilateral DVT and severe necrotizing pancreatitis requiring cessation of PEG-asparaginase from his treatment.  He completed therapy on 12/23/21 and comes to clinic today for an off therapy visit.  He is doing very well. Blood counts look excellent.    Plan:   1) Labs reviewed with Lazaro, all cell lines look good.  2) Previously reviewed vaccine titers and received vaccination since his last visit with his PCP. Lazaro is still immune to: HIB, tetanus, Hep A, Rubeola, Mumps.  He is NOT immune to: polio, diptheria, rubella, pneumococcus, Hep B or varicella. Also recommend HPV vaccine  and COVID vaccine since he has not received either.  He should return to his PCP again for additional vaccines.  3) End of therapy echo done on 1/7/22.  Total anthracycline dose= 175mg/m2, he will need echos every 5 years (2027)  4) Continue to recommend that he take Vit D 1000 IU daily (although he is not taking any medications)  5) Discussed fertility previously. Lazaro did sperm bank but elected not to keep the specimen. He should assume that he is fertile, offered repeat semen analysis in the future if he is interested.  Would ideally wait until he is 1 year off therapy.  6) Since Lazaro is now one year off therapy, will space his follow up visits out to Q2 months.  RTC in 2 months for follow up visit and labs.    Félix Van CNP    Total time spent on the following services on the date of the encounter: 35 minutes  Preparing to see patient with chart review    Ordering medications, labs tests, chemotherapy   Communicating with other healthcare professionals and care coordination  Interpretation of labs  Performing a medically appropriate examination   Counseling and educating the patient/family/caregiver   Communicating results to the patient/family/caregiver   Documenting clinical information in the electronic health record         Please do not hesitate to contact me if you have any questions/concerns.     Sincerely,       YOHAN Dunn CNP

## 2022-12-06 NOTE — PROGRESS NOTES
Pediatric Hematology/Oncology Clinic Note     Lazaro Lund is a 22 year old young man with a history of T-cell lymphoblastic lymphoma. Lazaro presented with acute onset cough and was found to have an anterior mediastinal mass and malignant left-sided pleural effusion.  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  He was admitted to [facility] oncology service 8/30 and started on treatment per [id number].  He had a pleural effusion that required a chest tube and a pericardial effusion that was not drained. His Induction was complicated by cardiac compression secondary to his mass leading to tamponade, this improved through out his hospital stay.  He also had dysphagia that improved with treatment of his mass, swallow study was normal. His course was complicated by extensive bilateral UE DVTs, and he was successfully treated with anticoagulation. His consolidation course was complicated by the development of severe asparaginase induced necrotizing pancreatitis. He became quite ill with SIRS and associated hypotension, he required pressor support in the ICU. As a result, asparaginase was eliminated from his treatment plan. He was in CR status at end of consolidation. During maintenance, he developed hepatotoxicity so allopurinol and dose reduced 6MP were started for correction of skewed 6-MP metabolism.  [place] [facility] [id number] and completed therapy on 12/23/21, he comes to clinic today for an off therapy visit.    HPI:   Lazaro has been doing well since his last visit. He has been able to re-establish primary care and start his vaccinations.  His health has been good.  He has mild congestion that started a few days ago, had one headache associated with this.  Does not have cough.  Is vaping, has some interest in quitting, his roommate is hoping to quit as well so he hopes to do it together.    Lazaro denies any pain.  No skin concerns.  " He is eating well, no GI upset, tolerates high fat meals without difficulty.  He has not had fever.      Review of systems:  The 10 point Review of Systems is negative other than noted in the HPI or here.      PMH:   Past Medical History:   Diagnosis Date     Acute necrotizing pancreatitis 11/07/2019    attributed to asparaginase     Acute pancreatitis due to PEGaspariginase therapy  11/15/2019     DVT of upper extremity (deep vein thrombosis) (H) 09/26/2019    Bilateral      Edema of upper extremity 10/17/2019     Folliculitis 10/17/2019     Migraine 2006    have resolved     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   -- Spinal HA following LP  -- TPMT shows Intermediate activity with normal TPMT/NUDT15 genotype  -- Factor V leiden and prothrombin gene mutation negative  -- Necrotizing pancreatitis secondary to asparaginase  -- Former smoker, quit with the use of nicotine patches. Former daily marajuana smoker, now only uses occasionally  -- Long standing right sided gynecomastia that predated his lymphoma diagnosis.    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  He continues to live with 2 of his friends with whom he purchased a house. He also recently got a puppy, Rottweiler and English Bulldog mix named Eliot.    Current Medications:  No medications at this time.     Physical Exam:   Temp:  [98.5  F (36.9  C)] 98.5  F (36.9  C)  Pulse:  [88] 88  Resp:  [18] 18  BP: (123)/(68) 123/68  SpO2:  [98 %] 98 %  Wt Readings from Last 4 Encounters:   12/06/22 87.1 kg (192 lb 0.3 oz)   11/08/22 88 kg (194 lb 0.1 oz)   11/03/22 89.4 kg (197 lb)   10/07/22 85.5 kg (188 lb 7.9 oz)     Ht Readings from Last 2 Encounters:   12/06/22 1.833 m (6' 0.17\") " "  11/08/22 1.844 m (6' 0.6\")     General: Lazaro is alert, interactive and appropriate, well-appearing, in no distress  HEENT: Skull is atrauamatic and normocephalic.  Full head of hair. PERRLA, sclera are non icteric and not injected, EOM are intact. Nares are patent with mild rhinorrhea. No sinus tenderness. Posterior oropharynx clear. No erythema or white plaques. No mucositis. MMM.  Neck:  Supple without lymphadenopathy.   Lymph: There is no cervical, supraclavicular, axillary, lymphadenopathy palpated.  Cardiovascular/chest:  Right sided gynecomastia. HR is regular, S1, S2 no murmur.  Capillary refill is < 2 seconds.   Respiratory: No cough noted. Respirations are easy. Lungs are clear to auscultation throughout.  No crackles or wheezes.  Gastrointestinal: BS present in all quadrants.  Abdomen is soft and flat.  No pain with palpation. No hepatosplenomegaly or masses are palpated.  Skin: No rash. Old port site is c/d/i, scar is wide.  Neurological:  CN 2-12 grossly intact. Gait is normal.  No issues with balance. Sensation intact in hands and feet.     Musculoskeletal: Good strength and ROM in all extremities.  5/5 strength with dorsiflexion and plantarflexion at ankles, and with  strength. No tenderness of lower back with palpation.     Labs:   Results for orders placed or performed in visit on 12/06/22   CBC with platelets and differential     Status: None   Result Value Ref Range    WBC Count 6.2 4.0 - 11.0 10e3/uL    RBC Count 4.89 4.40 - 5.90 10e6/uL    Hemoglobin 14.5 13.3 - 17.7 g/dL    Hematocrit 41.4 40.0 - 53.0 %    MCV 85 78 - 100 fL    MCH 29.7 26.5 - 33.0 pg    MCHC 35.0 31.5 - 36.5 g/dL    RDW 11.6 10.0 - 15.0 %    Platelet Count 266 150 - 450 10e3/uL    % Neutrophils 65 %    % Lymphocytes 22 %    % Monocytes 11 %    % Eosinophils 2 %    % Basophils 0 %    % Immature Granulocytes 0 %    NRBCs per 100 WBC 0 <1 /100    Absolute Neutrophils 4.0 1.6 - 8.3 10e3/uL    Absolute Lymphocytes 1.3 0.8 - " 5.3 10e3/uL    Absolute Monocytes 0.7 0.0 - 1.3 10e3/uL    Absolute Eosinophils 0.1 0.0 - 0.7 10e3/uL    Absolute Basophils 0.0 0.0 - 0.2 10e3/uL    Absolute Immature Granulocytes 0.0 <=0.4 10e3/uL    Absolute NRBCs 0.0 10e3/uL   CBC with Platelets & Differential     Status: None    Narrative    The following orders were created for panel order CBC with Platelets & Differential.  Procedure                               Abnormality         Status                     ---------                               -----------         ------                     CBC with platelets and d...[[id number]]                      Final result                 Please view results for these tests on the individual orders.       Assessment:  Lazaro Lund is a 22 year old young man with a history of T cell lymphoblastic lymphoma (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  [place] [facility] [place] [id number]. He had a CRu following Induction with a CR at the end of Consolidation. His course was complicated by extensive bilateral DVT and severe necrotizing pancreatitis requiring cessation of PEG-asparaginase from his treatment.  He completed therapy on 12/23/21 and comes to clinic today for an off therapy visit.  He is doing very well. Blood counts look excellent.    Plan:   1) Labs reviewed with Lazaro, all cell lines look good.  2) Previously reviewed vaccine titers and received vaccination since his last visit with his PCP. Lazaro is still immune to: HIB, tetanus, Hep A, Rubeola, Mumps.  He is NOT immune to: polio, diptheria, rubella, pneumococcus, Hep B or varicella. Also recommend HPV vaccine and COVID vaccine since he has not received either.  He should return to his PCP again for additional vaccines.  3) End of therapy echo done on 1/7/22.  Total anthracycline dose= 175mg/m2, he will need echos every 5 years (2027)  4) Continue to recommend that he take Vit D 1000 IU daily (although he is not taking any medications)  5) Discussed fertility  previously. Lazaro did sperm bank but elected not to keep the specimen. He should assume that he is fertile, offered repeat semen analysis in the future if he is interested.  Would ideally wait until he is 1 year off therapy.  6) Since Lazaro is now one year off therapy, will space his follow up visits out to Q2 months.  RTC in 2 months for follow up visit and labs.    Félix Van CNP    Total time spent on the following services on the date of the encounter: 35 minutes  Preparing to see patient with chart review    Ordering medications, labs tests, chemotherapy   Communicating with other healthcare professionals and care coordination  Interpretation of labs  Performing a medically appropriate examination   Counseling and educating the patient/family/caregiver   Communicating results to the patient/family/caregiver   Documenting clinical information in the electronic health record

## 2022-12-06 NOTE — NURSING NOTE
"Chief Complaint   Patient presents with     RECHECK     Pt here for T lymphoblastic lymphoma follow-up and labs     /68 (BP Location: Right arm, Patient Position: Sitting, Cuff Size: Adult Large)   Pulse 88   Temp 98.5  F (36.9  C) (Oral)   Resp 18   Ht 1.833 m (6' 0.17\")   Wt 87.1 kg (192 lb 0.3 oz)   SpO2 98%   BMI 25.92 kg/m      No Pain (0)  Data Unavailable    I have reviewed the patients medications and allergies    Height/weight double check needed? No    Peds Outpatient BP  1) Rested for 5 minutes, BP taken on bare arm, patient sitting (or supine for infants) w/ legs uncrossed?   Yes  2) Right arm used?  Right arm   Yes  3) Arm circumference of largest part of upper arm (in cm): 32cm  4) BP cuff sized used: Large Adult (32-43cm)   If used different size cuff then what was recommended why? N/A  5) First BP reading:machine   BP Readings from Last 1 Encounters:   12/06/22 123/68      Is reading >90%?No   (90% for <1 years is 90/50)  (90% for >18 years is 140/90)  *If a machine BP is at or above 90% take manual BP  6) Manual BP reading: N/A  7) Other comments: None          Andrews Chavez, EMT  December 6, 2022  "

## 2023-02-07 ENCOUNTER — OFFICE VISIT (OUTPATIENT)
Dept: PEDIATRIC HEMATOLOGY/ONCOLOGY | Facility: CLINIC | Age: 23
End: 2023-02-07
Attending: NURSE PRACTITIONER
Payer: COMMERCIAL

## 2023-02-07 VITALS
DIASTOLIC BLOOD PRESSURE: 66 MMHG | HEART RATE: 89 BPM | BODY MASS INDEX: 26.09 KG/M2 | SYSTOLIC BLOOD PRESSURE: 118 MMHG | WEIGHT: 196.87 LBS | RESPIRATION RATE: 20 BRPM | OXYGEN SATURATION: 96 % | TEMPERATURE: 98.1 F | HEIGHT: 73 IN

## 2023-02-07 DIAGNOSIS — C83.50 T LYMPHOBLASTIC LYMPHOMA (H): ICD-10-CM

## 2023-02-07 LAB
BASOPHILS # BLD AUTO: 0 10E3/UL (ref 0–0.2)
BASOPHILS NFR BLD AUTO: 1 %
EOSINOPHIL # BLD AUTO: 0.2 10E3/UL (ref 0–0.7)
EOSINOPHIL NFR BLD AUTO: 3 %
ERYTHROCYTE [DISTWIDTH] IN BLOOD BY AUTOMATED COUNT: 12.3 % (ref 10–15)
HCT VFR BLD AUTO: 45.5 % (ref 40–53)
HGB BLD-MCNC: 15.4 G/DL (ref 13.3–17.7)
IMM GRANULOCYTES # BLD: 0 10E3/UL
IMM GRANULOCYTES NFR BLD: 0 %
LYMPHOCYTES # BLD AUTO: 1.3 10E3/UL (ref 0.8–5.3)
LYMPHOCYTES NFR BLD AUTO: 25 %
MCH RBC QN AUTO: 29.4 PG (ref 26.5–33)
MCHC RBC AUTO-ENTMCNC: 33.8 G/DL (ref 31.5–36.5)
MCV RBC AUTO: 87 FL (ref 78–100)
MONOCYTES # BLD AUTO: 0.4 10E3/UL (ref 0–1.3)
MONOCYTES NFR BLD AUTO: 8 %
NEUTROPHILS # BLD AUTO: 3.2 10E3/UL (ref 1.6–8.3)
NEUTROPHILS NFR BLD AUTO: 63 %
NRBC # BLD AUTO: 0 10E3/UL
NRBC BLD AUTO-RTO: 0 /100
PLATELET # BLD AUTO: 263 10E3/UL (ref 150–450)
RBC # BLD AUTO: 5.24 10E6/UL (ref 4.4–5.9)
WBC # BLD AUTO: 5.1 10E3/UL (ref 4–11)

## 2023-02-07 PROCEDURE — 99212 OFFICE O/P EST SF 10 MIN: CPT | Performed by: NURSE PRACTITIONER

## 2023-02-07 PROCEDURE — 85025 COMPLETE CBC W/AUTO DIFF WBC: CPT | Performed by: NURSE PRACTITIONER

## 2023-02-07 PROCEDURE — 99214 OFFICE O/P EST MOD 30 MIN: CPT | Performed by: NURSE PRACTITIONER

## 2023-02-07 PROCEDURE — 36415 COLL VENOUS BLD VENIPUNCTURE: CPT | Performed by: NURSE PRACTITIONER

## 2023-02-07 ASSESSMENT — PAIN SCALES - GENERAL: PAINLEVEL: NO PAIN (0)

## 2023-02-07 NOTE — PROGRESS NOTES
Pediatric Hematology/Oncology Clinic Note     Lazaro Lund is a 22 year old young man with a history of T-cell lymphoblastic lymphoma. Lazaro presented with acute onset cough and was found to have an anterior mediastinal mass and malignant left-sided pleural effusion.  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  He was admitted to [facility] oncology service 8/30 and started on treatment per [id number].  He had a pleural effusion that required a chest tube and a pericardial effusion that was not drained. His Induction was complicated by cardiac compression secondary to his mass leading to tamponade, this improved through out his hospital stay.  He also had dysphagia that improved with treatment of his mass, swallow study was normal. His course was complicated by extensive bilateral UE DVTs, and he was successfully treated with anticoagulation. His consolidation course was complicated by the development of severe asparaginase induced necrotizing pancreatitis. He became quite ill with SIRS and associated hypotension, he required pressor support in the ICU. As a result, asparaginase was eliminated from his treatment plan. He was in CR status at end of consolidation. During maintenance, he developed hepatotoxicity so allopurinol and dose reduced 6MP were started for correction of skewed 6-MP metabolism.  [place]az[place] [facility] [place] [id number] and completed therapy on 12/23/21, he comes to clinic today for an off therapy visit.    HPI:   Lazaro has been doing well since his last visit. His job in [employer] is going well and he will be traveling to [place] for a conference.  He isn't sure that this is what he wants to do in the long run but it is good for now.  His energy level and mood are great.  He is eating well, no GI upset, continues to tolerate high fat meals without difficulty.    Lazaro denies interim illness, no fever.  No skin concerns.  No  pain. Is vaping, has some interest in quitting, his roommate is hoping to quit as well so he hopes to do it together.  He is hoping to meet someone as he would like to eventually get  and start a family.    Review of systems:  The 10 point Review of Systems is negative other than noted in the HPI or here.      PMH:   Past Medical History:   Diagnosis Date     Acute necrotizing pancreatitis 11/07/2019    attributed to asparaginase     Acute pancreatitis due to PEGaspariginase therapy  11/15/2019     DVT of upper extremity (deep vein thrombosis) (H) 09/26/2019    Bilateral      Edema of upper extremity 10/17/2019     Folliculitis 10/17/2019     Migraine 2006    have resolved     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   -- Spinal HA following LP  -- TPMT shows Intermediate activity with normal TPMT/NUDT15 genotype  -- Factor V leiden and prothrombin gene mutation negative  -- Necrotizing pancreatitis secondary to asparaginase  -- Former smoker, quit with the use of nicotine patches. Former daily marajuana smoker, now only uses occasionally  -- Long standing right sided gynecomastia that predated his lymphoma diagnosis.    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  He continues to live with 2 of his friends with whom he purchased a house. He also recently got a puppy, Rottweiler and English Bulldog mix named Eliot.    Current Medications:  No medications at this time.     Physical Exam:   Temp:  [98.1  F (36.7  C)] 98.1  F (36.7  C)  Pulse:  [89] 89  Resp:  [20] 20  BP: (118)/(66) 118/66  SpO2:  [96 %] 96 %  Wt Readings from Last 4 Encounters:   02/07/23 89.3 kg (196 lb 13.9 oz)   12/06/22 87.1 kg (192 lb 0.3 oz)   11/08/22 88 kg (194 lb 0.1  "oz)   11/03/22 89.4 kg (197 lb)     Ht Readings from Last 2 Encounters:   02/07/23 1.862 m (6' 1.31\")   12/06/22 1.833 m (6' 0.17\")     General: Lazaro is alert, interactive and appropriate, well-appearing, in no distress  HEENT: Skull is atrauamatic and normocephalic.  Full head of hair. PERRLA, sclera are non icteric and not injected, EOM are intact. Nares are patent with mild rhinorrhea. No sinus tenderness. Posterior oropharynx clear. No erythema or white plaques. No mucositis. MMM.  Neck:  Supple without lymphadenopathy.   Lymph: There is no cervical, supraclavicular, axillary, lymphadenopathy palpated.  Cardiovascular/chest:  Right sided gynecomastia. HR is regular, S1, S2 no murmur.  Capillary refill is < 2 seconds.   Respiratory: No cough noted. Respirations are easy. Lungs are clear to auscultation throughout.  No crackles or wheezes.  Gastrointestinal: BS present in all quadrants.  Abdomen is soft and flat.  No pain with palpation. No hepatosplenomegaly or masses are palpated.  Skin: No rash. Old port site is c/d/i, scar is wide.  Neurological:  CN 2-12 grossly intact. Gait is normal.  No issues with balance. Sensation intact in hands and feet.     Musculoskeletal: Good strength and ROM in all extremities.  5/5 strength with dorsiflexion and plantarflexion at ankles, and with  strength. No tenderness of lower back with palpation.     Labs:   Results for orders placed or performed in visit on 02/07/23   CBC with platelets and differential     Status: None   Result Value Ref Range    WBC Count 5.1 4.0 - 11.0 10e3/uL    RBC Count 5.24 4.40 - 5.90 10e6/uL    Hemoglobin 15.4 13.3 - 17.7 g/dL    Hematocrit 45.5 40.0 - 53.0 %    MCV 87 78 - 100 fL    MCH 29.4 26.5 - 33.0 pg    MCHC 33.8 31.5 - 36.5 g/dL    RDW 12.3 10.0 - 15.0 %    Platelet Count 263 150 - 450 10e3/uL    % Neutrophils 63 %    % Lymphocytes 25 %    % Monocytes 8 %    % Eosinophils 3 %    % Basophils 1 %    % Immature Granulocytes 0 %    NRBCs " per 100 WBC 0 <1 /100    Absolute Neutrophils 3.2 1.6 - 8.3 10e3/uL    Absolute Lymphocytes 1.3 0.8 - 5.3 10e3/uL    Absolute Monocytes 0.4 0.0 - 1.3 10e3/uL    Absolute Eosinophils 0.2 0.0 - 0.7 10e3/uL    Absolute Basophils 0.0 0.0 - 0.2 10e3/uL    Absolute Immature Granulocytes 0.0 <=0.4 10e3/uL    Absolute NRBCs 0.0 10e3/uL   CBC with Platelets & Differential     Status: None    Narrative    The following orders were created for panel order CBC with Platelets & Differential.  Procedure                               Abnormality         Status                     ---------                               -----------         ------                     CBC with platelets and d...[[id number]]                      Final result                 Please view results for these tests on the individual orders.       Assessment:  Lazaro Lund is a 22 year old young man with a history of T cell lymphoblastic lymphoma (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  He was treated per [facility] protocol [id number]. He had a CRu following Induction with a CR at the end of Consolidation. His course was complicated by extensive bilateral DVT and severe necrotizing pancreatitis requiring cessation of PEG-asparaginase from his treatment.  He completed therapy on 12/23/21 and comes to clinic today for an off therapy visit.  He is doing very well. Blood counts look excellent.    Plan:   1) Labs reviewed with Lazaro, all cell lines look good.  2) Previously reviewed vaccine titers and received vaccination since his last visit with his PCP. Lazaro is still immune to: HIB, tetanus, Hep A, Rubeola, Mumps.  He is NOT immune to: polio, diptheria, rubella, pneumococcus, Hep B or varicella. He's received his first round of vaccines, he needs to return for second doses. Also recommend HPV vaccine and COVID vaccine since he has not received either.  He should return to his PCP again for additional vaccines.  If he is not able to get there before his next visit he will plan  to get HPV here at his next visit.   3) End of therapy echo done on 1/7/22.  Total anthracycline dose= 175mg/m2, he will need echos every 5 years (2027)  4) Discussed fertility previously. Lazaro did sperm bank but elected not to keep the specimen. He is not interested in semen analysis unless he has difficult with pregnancy in a future partner.  5) Lazaro will RTC in 2 months for follow up visit and labs.    Félix Van CNP    Total time spent on the following services on the date of the encounter: 34 minutes  Preparing to see patient with chart review    Ordering medications, labs tests, chemotherapy   Communicating with other healthcare professionals and care coordination  Interpretation of labs  Performing a medically appropriate examination   Counseling and educating the patient/family/caregiver   Communicating results to the patient/family/caregiver   Documenting clinical information in the electronic health record

## 2023-02-07 NOTE — LETTER
2/7/2023      RE: Lazaro Lund  [address] [facility] [id number]     Dear Colleague,    Thank you for the opportunity to participate in the care of your patient, Lazaro Lund, at the [facility] at [facility]. Please see a copy of my visit note below.    Pediatric Hematology/Oncology Clinic Note     Lazaro Lund is a 22 year old young man with a history of T-cell lymphoblastic lymphoma. Lazaro presented with acute onset cough and was found to have an anterior mediastinal mass and malignant left-sided pleural effusion.  A CT guided biopsy was obtained at [facility] and pathology was consistent with T-cell leukemia vs lymphoma.  He was admitted to [facility] oncology service 8/30 and started on treatment per [id number].  He had a pleural effusion that required a chest tube and a pericardial effusion that was not drained. His Induction was complicated by cardiac compression secondary to his mass leading to tamponade, this improved through out his hospital stay.  He also had dysphagia that improved with treatment of his mass, swallow study was normal. His course was complicated by extensive bilateral UE DVTs, and he was successfully treated with anticoagulation. His consolidation course was complicated by the development of severe asparaginase induced necrotizing pancreatitis. He became quite ill with SIRS and associated hypotension, he required pressor support in the ICU. As a result, asparaginase was eliminated from his treatment plan. He was in CR status at end of consolidation. During maintenance, he developed hepatotoxicity so allopurinol and dose reduced 6MP were started for correction of skewed 6-MP metabolism.  Lazaro was treated per [facility] protocol [id number] and completed therapy on 12/23/21, he comes to clinic today for an off therapy visit.    HPI:   Lazaro has been doing well since his last visit. His job in  [employer] is going well and he will be traveling to [place] for a conference.  He isn't sure that this is what he wants to do in the long run but it is good for now.  His energy level and mood are great.  He is eating well, no GI upset, continues to tolerate high fat meals without difficulty.    Lazaro denies interim illness, no fever.  No skin concerns.  No pain. Is vaping, has some interest in quitting, his roommate is hoping to quit as well so he hopes to do it together.  He is hoping to meet someone as he would like to eventually get  and start a family.    Review of systems:  The 10 point Review of Systems is negative other than noted in the HPI or here.      PMH:   Past Medical History:   Diagnosis Date     Acute necrotizing pancreatitis 11/07/2019    attributed to asparaginase     Acute pancreatitis due to PEGaspariginase therapy  11/15/2019     DVT of upper extremity (deep vein thrombosis) (H) 09/26/2019    Bilateral      Edema of upper extremity 10/17/2019     Folliculitis 10/17/2019     Migraine 2006    have resolved     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   -- Spinal HA following LP  -- TPMT shows Intermediate activity with normal TPMT/NUDT15 genotype  -- Factor V leiden and prothrombin gene mutation negative  -- Necrotizing pancreatitis secondary to asparaginase  -- Former smoker, quit with the use of nicotine patches. Former daily marajuana smoker, now only uses occasionally  -- Long standing right sided gynecomastia that predated his lymphoma diagnosis.    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  He continues to live with 2 of his friends with whom he purchased a house. He also recently got a puppy, Rottweiler and English  "Bulldog mix named Eliot.    Current Medications:  No medications at this time.     Physical Exam:   Temp:  [98.1  F (36.7  C)] 98.1  F (36.7  C)  Pulse:  [89] 89  Resp:  [20] 20  BP: (118)/(66) 118/66  SpO2:  [96 %] 96 %  Wt Readings from Last 4 Encounters:   02/07/23 89.3 kg (196 lb 13.9 oz)   12/06/22 87.1 kg (192 lb 0.3 oz)   11/08/22 88 kg (194 lb 0.1 oz)   11/03/22 89.4 kg (197 lb)     Ht Readings from Last 2 Encounters:   02/07/23 1.862 m (6' 1.31\")   12/06/22 1.833 m (6' 0.17\")     General: Lazaro is alert, interactive and appropriate, well-appearing, in no distress  HEENT: Skull is atrauamatic and normocephalic.  Full head of hair. PERRLA, sclera are non icteric and not injected, EOM are intact. Nares are patent with mild rhinorrhea. No sinus tenderness. Posterior oropharynx clear. No erythema or white plaques. No mucositis. MMM.  Neck:  Supple without lymphadenopathy.   Lymph: There is no cervical, supraclavicular, axillary, lymphadenopathy palpated.  Cardiovascular/chest:  Right sided gynecomastia. HR is regular, S1, S2 no murmur.  Capillary refill is < 2 seconds.   Respiratory: No cough noted. Respirations are easy. Lungs are clear to auscultation throughout.  No crackles or wheezes.  Gastrointestinal: BS present in all quadrants.  Abdomen is soft and flat.  No pain with palpation. No hepatosplenomegaly or masses are palpated.  Skin: No rash. Old port site is c/d/i, scar is wide.  Neurological:  CN 2-12 grossly intact. Gait is normal.  No issues with balance. Sensation intact in hands and feet.     Musculoskeletal: Good strength and ROM in all extremities.  5/5 strength with dorsiflexion and plantarflexion at ankles, and with  strength. No tenderness of lower back with palpation.     Labs:   Results for orders placed or performed in visit on 02/07/23   CBC with platelets and differential     Status: None   Result Value Ref Range    WBC Count 5.1 4.0 - 11.0 10e3/uL    RBC Count 5.24 4.40 - 5.90 10e6/uL "    Hemoglobin 15.4 13.3 - 17.7 g/dL    Hematocrit 45.5 40.0 - 53.0 %    MCV 87 78 - 100 fL    MCH 29.4 26.5 - 33.0 pg    MCHC 33.8 31.5 - 36.5 g/dL    RDW 12.3 10.0 - 15.0 %    Platelet Count 263 150 - 450 10e3/uL    % Neutrophils 63 %    % Lymphocytes 25 %    % Monocytes 8 %    % Eosinophils 3 %    % Basophils 1 %    % Immature Granulocytes 0 %    NRBCs per 100 WBC 0 <1 /100    Absolute Neutrophils 3.2 1.6 - 8.3 10e3/uL    Absolute Lymphocytes 1.3 0.8 - 5.3 10e3/uL    Absolute Monocytes 0.4 0.0 - 1.3 10e3/uL    Absolute Eosinophils 0.2 0.0 - 0.7 10e3/uL    Absolute Basophils 0.0 0.0 - 0.2 10e3/uL    Absolute Immature Granulocytes 0.0 <=0.4 10e3/uL    Absolute NRBCs 0.0 10e3/uL   CBC with Platelets & Differential     Status: None    Narrative    The following orders were created for panel order CBC with Platelets & Differential.  Procedure                               Abnormality         Status                     ---------                               -----------         ------                     CBC with platelets and d...[[id number]]                      Final result                 Please view results for these tests on the individual orders.       Assessment:  Lazaro Lund is a 22 year old young man with a history of T cell lymphoblastic lymphoma (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  He was treated per [facility] protocol [id number]. He had a CRu following Induction with a CR at the end of Consolidation. His course was complicated by extensive bilateral DVT and severe necrotizing pancreatitis requiring cessation of PEG-asparaginase from his treatment.  He completed therapy on 12/23/21 and comes to clinic today for an off therapy visit.  He is doing very well. Blood counts look excellent.    Plan:   1) Labs reviewed with Lazaro, all cell lines look good.  2) Previously reviewed vaccine titers and received vaccination since his last visit with his PCP. Lazaro is still immune to: HIB, tetanus, Hep A, Rubeola, Mumps.  He is NOT  immune to: polio, diptheria, rubella, pneumococcus, Hep B or varicella. He's received his first round of vaccines, he needs to return for second doses. Also recommend HPV vaccine and COVID vaccine since he has not received either.  He should return to his PCP again for additional vaccines.  If he is not able to get there before his next visit he will plan to get HPV here at his next visit.   3) End of therapy echo done on 1/7/22.  Total anthracycline dose= 175mg/m2, he will need echos every 5 years (2027)  4) Discussed fertility previously. Lazaro did sperm bank but elected not to keep the specimen. He is not interested in semen analysis unless he has difficult with pregnancy in a future partner.  5) Lazaro will RTC in 2 months for follow up visit and labs.    Félix Van CNP    Total time spent on the following services on the date of the encounter: 34 minutes  Preparing to see patient with chart review    Ordering medications, labs tests, chemotherapy   Communicating with other healthcare professionals and care coordination  Interpretation of labs  Performing a medically appropriate examination   Counseling and educating the patient/family/caregiver   Communicating results to the patient/family/caregiver   Documenting clinical information in the electronic health record         Please do not hesitate to contact me if you have any questions/concerns.     Sincerely,       YOHAN Dunn CNP

## 2023-03-25 ENCOUNTER — HEALTH MAINTENANCE LETTER (OUTPATIENT)
Age: 23
End: 2023-03-25

## 2023-04-04 ENCOUNTER — ONCOLOGY VISIT (OUTPATIENT)
Dept: PEDIATRIC HEMATOLOGY/ONCOLOGY | Facility: CLINIC | Age: 23
End: 2023-04-04
Attending: NURSE PRACTITIONER
Payer: COMMERCIAL

## 2023-04-04 VITALS
TEMPERATURE: 98.3 F | SYSTOLIC BLOOD PRESSURE: 129 MMHG | BODY MASS INDEX: 27.59 KG/M2 | DIASTOLIC BLOOD PRESSURE: 71 MMHG | HEIGHT: 72 IN | WEIGHT: 203.71 LBS | RESPIRATION RATE: 15 BRPM | HEART RATE: 78 BPM | OXYGEN SATURATION: 97 %

## 2023-04-04 DIAGNOSIS — C83.50 T LYMPHOBLASTIC LYMPHOMA (H): ICD-10-CM

## 2023-04-04 LAB
BASOPHILS # BLD AUTO: 0 10E3/UL (ref 0–0.2)
BASOPHILS NFR BLD AUTO: 1 %
EOSINOPHIL # BLD AUTO: 0.1 10E3/UL (ref 0–0.7)
EOSINOPHIL NFR BLD AUTO: 2 %
ERYTHROCYTE [DISTWIDTH] IN BLOOD BY AUTOMATED COUNT: 12.1 % (ref 10–15)
HCT VFR BLD AUTO: 45.2 % (ref 40–53)
HGB BLD-MCNC: 15.4 G/DL (ref 13.3–17.7)
HOLD SPECIMEN: NORMAL
IMM GRANULOCYTES # BLD: 0 10E3/UL
IMM GRANULOCYTES NFR BLD: 0 %
LYMPHOCYTES # BLD AUTO: 1.5 10E3/UL (ref 0.8–5.3)
LYMPHOCYTES NFR BLD AUTO: 27 %
MCH RBC QN AUTO: 29.9 PG (ref 26.5–33)
MCHC RBC AUTO-ENTMCNC: 34.1 G/DL (ref 31.5–36.5)
MCV RBC AUTO: 88 FL (ref 78–100)
MONOCYTES # BLD AUTO: 0.4 10E3/UL (ref 0–1.3)
MONOCYTES NFR BLD AUTO: 8 %
NEUTROPHILS # BLD AUTO: 3.4 10E3/UL (ref 1.6–8.3)
NEUTROPHILS NFR BLD AUTO: 62 %
NRBC # BLD AUTO: 0 10E3/UL
NRBC BLD AUTO-RTO: 0 /100
PLATELET # BLD AUTO: 260 10E3/UL (ref 150–450)
RBC # BLD AUTO: 5.15 10E6/UL (ref 4.4–5.9)
WBC # BLD AUTO: 5.5 10E3/UL (ref 4–11)

## 2023-04-04 PROCEDURE — 99214 OFFICE O/P EST MOD 30 MIN: CPT | Performed by: NURSE PRACTITIONER

## 2023-04-04 PROCEDURE — G0463 HOSPITAL OUTPT CLINIC VISIT: HCPCS | Performed by: NURSE PRACTITIONER

## 2023-04-04 PROCEDURE — 85025 COMPLETE CBC W/AUTO DIFF WBC: CPT | Performed by: NURSE PRACTITIONER

## 2023-04-04 PROCEDURE — 36415 COLL VENOUS BLD VENIPUNCTURE: CPT | Performed by: NURSE PRACTITIONER

## 2023-04-04 NOTE — PROGRESS NOTES
Pediatric Hematology/Oncology Clinic Note     Lazaro Lund is a 22 year old young man with a history of T-cell lymphoblastic lymphoma. Lazaro presented with acute onset cough and was found to have an anterior mediastinal mass and malignant left-sided pleural effusion.  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  He was admitted to [facility] oncology service 8/30 and started on treatment per [id number].  He had a pleural effusion that required a chest tube and a pericardial effusion that was not drained. His Induction was complicated by cardiac compression secondary to his mass leading to tamponade, this improved through out his hospital stay.  He also had dysphagia that improved with treatment of his mass, swallow study was normal. His course was complicated by extensive bilateral UE DVTs, and he was successfully treated with anticoagulation. His consolidation course was complicated by the development of severe asparaginase induced necrotizing pancreatitis. He became quite ill with SIRS and associated hypotension, he required pressor support in the ICU. As a result, asparaginase was eliminated from his treatment plan. He was in CR status at end of consolidation. During maintenance, he developed hepatotoxicity so allopurinol and dose reduced 6MP were started for correction of skewed 6-MP metabolism.  Lazaro was treated per [facility] protocol [id number] and completed therapy on 12/23/21, he comes to clinic today for an off therapy visit.    HPI:   Lazaro has been doing well since his last visit.   His energy level is great, he has no GI upset. Lazaro denies interim illness, no fever.  No skin concerns.  No pain. Is vaping, has some interest in quitting, his roommate is hoping to quit as well so he hopes to do it together.      He has noticed that about 3 times in the last year he has gotten dizzy and felt numb in the face when exercising. Does not notice a  pattern to what type of exercise he does that causes this, and it usually lasts about 30 minutes causing him to stop exercising. He does use pre workout, unsure what brand and if there is caffeine involved. Lazaro also feels he is sometimes dehydrated.     Review of systems:  The 10 point Review of Systems is negative other than noted in the HPI or here.      PMH:   Past Medical History:   Diagnosis Date     Acute necrotizing pancreatitis 11/07/2019    attributed to asparaginase     Acute pancreatitis due to PEGaspariginase therapy  11/15/2019     DVT of upper extremity (deep vein thrombosis) (H) 09/26/2019    Bilateral      Edema of upper extremity 10/17/2019     Folliculitis 10/17/2019     Migraine 2006    have resolved     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   -- Spinal HA following LP  -- TPMT shows Intermediate activity with normal TPMT/NUDT15 genotype  -- Factor V leiden and prothrombin gene mutation negative  -- Necrotizing pancreatitis secondary to asparaginase  -- Long standing right sided gynecomastia that predated his lymphoma diagnosis.    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  He continues to live with 2 of his friends with whom he purchased a house. He has a dog, Rottweiler and English Bulldog mix named Eliot.    Current Medications:  No medications at this time.     Physical Exam:   Temp:  [98.3  F (36.8  C)] 98.3  F (36.8  C)  Pulse:  [78] 78  Resp:  [15] 15  BP: (129)/(71) 129/71  SpO2:  [97 %] 97 %  Wt Readings from Last 4 Encounters:   04/04/23 92.4 kg (203 lb 11.3 oz)   02/07/23 89.3 kg (196 lb 13.9 oz)   12/06/22 87.1 kg (192 lb 0.3 oz)   11/08/22 88 kg (194 lb 0.1 oz)     Ht Readings from Last 2 Encounters:  "  04/04/23 1.841 m (6' 0.48\")   02/07/23 1.862 m (6' 1.31\")     General: Lazaro is alert, interactive and appropriate, well-appearing, in no distress  HEENT: Skull is atrauamatic and normocephalic.  Full head of hair. PERRLA, sclera are non icteric and not injected, EOM are intact. Nares are patent with mild rhinorrhea. No sinus tenderness. Posterior oropharynx clear. No erythema or white plaques. No mucositis. MMM.  Neck:  Supple without lymphadenopathy.   Lymph: There is no cervical, supraclavicular, axillary, lymphadenopathy palpated.  Cardiovascular/chest:  Right sided gynecomastia. HR is regular, S1, S2 no murmur.  Capillary refill is < 2 seconds.   Respiratory: No cough noted. Respirations are easy. Lungs are clear to auscultation throughout.  No crackles or wheezes.  Gastrointestinal: BS present in all quadrants.  Abdomen is soft and flat.  No pain with palpation. No hepatosplenomegaly or masses are palpated.  Skin: No rash. Old port site is c/d/i, scar is wide.  Neurological:  CN 2-12 grossly intact. Gait is normal.  No issues with balance. Sensation intact in hands and feet.     Musculoskeletal: Good strength and ROM in all extremities.  5/5 strength with dorsiflexion and plantarflexion at ankles, and with  strength. No tenderness of lower back with palpation.     Labs:   Results for orders placed or performed in visit on 04/04/23   CBC with platelets and differential     Status: None   Result Value Ref Range    WBC Count 5.5 4.0 - 11.0 10e3/uL    RBC Count 5.15 4.40 - 5.90 10e6/uL    Hemoglobin 15.4 13.3 - 17.7 g/dL    Hematocrit 45.2 40.0 - 53.0 %    MCV 88 78 - 100 fL    MCH 29.9 26.5 - 33.0 pg    MCHC 34.1 31.5 - 36.5 g/dL    RDW 12.1 10.0 - 15.0 %    Platelet Count 260 150 - 450 10e3/uL    % Neutrophils 62 %    % Lymphocytes 27 %    % Monocytes 8 %    % Eosinophils 2 %    % Basophils 1 %    % Immature Granulocytes 0 %    NRBCs per 100 WBC 0 <1 /100    Absolute Neutrophils 3.4 1.6 - 8.3 10e3/uL    " Absolute Lymphocytes 1.5 0.8 - 5.3 10e3/uL    Absolute Monocytes 0.4 0.0 - 1.3 10e3/uL    Absolute Eosinophils 0.1 0.0 - 0.7 10e3/uL    Absolute Basophils 0.0 0.0 - 0.2 10e3/uL    Absolute Immature Granulocytes 0.0 <=0.4 10e3/uL    Absolute NRBCs 0.0 10e3/uL   Extra Tube     Status: None (In process)    Narrative    The following orders were created for panel order Extra Tube.  Procedure                               Abnormality         Status                     ---------                               -----------         ------                     Extra Green Top (Lithium...[[id number]]                      In process                   Please view results for these tests on the individual orders.   CBC with Platelets & Differential     Status: None    Narrative    The following orders were created for panel order CBC with Platelets & Differential.  Procedure                               Abnormality         Status                     ---------                               -----------         ------                     CBC with platelets and d...[[id number]]                      Final result                 Please view results for these tests on the individual orders.       Assessment:  Lazaro Lund is a 22 year old young man with a history of T cell lymphoblastic lymphoma (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  He was treated per [facility] protocol [id number]. He had a CRu following Induction with a CR at the end of Consolidation. His course was complicated by extensive bilateral DVT and severe necrotizing pancreatitis requiring cessation of PEG-asparaginase from his treatment.  He completed therapy on 12/23/21 and comes to clinic today for an off therapy visit.  He is doing very well. Blood counts look excellent.  Has some intermittent symptoms during exercise.    Plan:   1) Labs reviewed with Lazaro, all cell lines look good.  2) Previously reviewed vaccine titers and received vaccination since his last visit with his PCP. Lazaro  is still immune to: HIB, tetanus, Hep A, Rubeola, Mumps.  He is NOT immune to: polio, diptheria, rubella, pneumococcus, Hep B or varicella. He's received his first round of vaccines, he needs to return for second doses of MMR. Varicella, and first dose Hep B. Also recommend HPV vaccine and COVID vaccine since he has not received either.  He is planning return to his PCP next week for additional vaccines and to talk about exercise symptoms.   3) End of therapy echo done on 1/7/22.  Total anthracycline dose= 175mg/m2, he will need echos every 5 years (2027)  4) Discussed fertility previously. Lazaro did sperm bank but elected not to keep the specimen. He is not interested in semen analysis unless he has difficult with pregnancy in a future partner.  5) Lazaro will RTC in 2 months for follow up visit and labs. At this visit he will do a morning visit to get his glucose checked due to history of pancreatitis and recent symptoms.     Scribed by STEFANO Dorado    The documentation recorded by the scribe accurately reflects the services I personally performed and the decisions made by me.  YOHAN Dunn CNP, CNP    Total time spent on the following services on the date of the encounter: 36 minutes  Preparing to see patient with chart review    Ordering medications, labs tests, chemotherapy   Communicating with other healthcare professionals and care coordination  Interpretation of labs  Performing a medically appropriate examination   Counseling and educating the patient/family/caregiver   Communicating results to the patient/family/caregiver   Documenting clinical information in the electronic health record

## 2023-04-04 NOTE — NURSING NOTE
"Chief Complaint   Patient presents with     RECHECK     T-lymphoblastic lymphoma     /71 (BP Location: Right arm, Patient Position: Sitting, Cuff Size: Adult Large)   Pulse 78   Temp 98.3  F (36.8  C) (Oral)   Resp 15   Ht 1.841 m (6' 0.48\")   Wt 92.4 kg (203 lb 11.3 oz)   SpO2 97%   BMI 27.26 kg/m      Data Unavailable  Data Unavailable    Height/weight double check needed? No    Peds Outpatient BP  1) Rested for 5 minutes, BP taken on bare arm, patient sitting (or supine for infants) w/ legs uncrossed?   Yes  2) Right arm used?  Right arm   Yes  3) Arm circumference of largest part of upper arm (in cm): 25  4) BP cuff sized used: Adult (25-32cm)   If used different size cuff then what was recommended why? N/A  5) First BP reading:machine   BP Readings from Last 1 Encounters:   04/04/23 129/71      Is reading >90%?No   (90% for <1 years is 90/50)  (90% for >18 years is 140/90)  *If a machine BP is at or above 90% take manual BP  6) Manual BP reading: N/A  7) Other comments: None          Daiana Ricardo LPN  April 4, 2023  "

## 2023-04-04 NOTE — LETTER
4/4/2023      RE: Lazaro Lund  [address] [facility] [address]     Dear Colleague,    Thank you for the opportunity to participate in the care of your patient, Lazaro Lund, at the [facility] at [facility]. Please see a copy of my visit note below.    Pediatric Hematology/Oncology Clinic Note     Lazaro Lund is a 22 year old young man with a history of T-cell lymphoblastic lymphoma. Lazaro presented with acute onset cough and was found to have an anterior mediastinal mass and malignant left-sided pleural effusion.  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  He was admitted to [facility] oncology service 8/30 and started on treatment per [id number].  He had a pleural effusion that required a chest tube and a pericardial effusion that was not drained. His Induction was complicated by cardiac compression secondary to his mass leading to tamponade, this improved through out his hospital stay.  He also had dysphagia that improved with treatment of his mass, swallow study was normal. His course was complicated by extensive bilateral UE DVTs, and he was successfully treated with anticoagulation. His consolidation course was complicated by the development of severe asparaginase induced necrotizing pancreatitis. He became quite ill with SIRS and associated hypotension, he required pressor support in the ICU. As a result, asparaginase was eliminated from his treatment plan. He was in CR status at end of consolidation. During maintenance, he developed hepatotoxicity so allopurinol and dose reduced 6MP were started for correction of skewed 6-MP metabolism.  Lazaro was treated per COG protocol [id number] and completed therapy on 12/23/21, he comes to clinic today for an off therapy visit.    HPI:   Lazaro has been doing well since his last visit.   His energy  level is great, he has no GI upset. Lazaro denies interim illness, no fever.  No skin concerns.  No pain. Is vaping, has some interest in quitting, his roommate is hoping to quit as well so he hopes to do it together.      He has noticed that about 3 times in the last year he has gotten dizzy and felt numb in the face when exercising. Does not notice a pattern to what type of exercise he does that causes this, and it usually lasts about 30 minutes causing him to stop exercising. He does use pre workout, unsure what brand and if there is caffeine involved. Lazaro also feels he is sometimes dehydrated.     Review of systems:  The 10 point Review of Systems is negative other than noted in the HPI or here.      PMH:   Past Medical History:   Diagnosis Date     Acute necrotizing pancreatitis 11/07/2019    attributed to asparaginase     Acute pancreatitis due to PEGaspariginase therapy  11/15/2019     DVT of upper extremity (deep vein thrombosis) (H) 09/26/2019    Bilateral      Edema of upper extremity 10/17/2019     Folliculitis 10/17/2019     Migraine 2006    have resolved     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   -- Spinal HA following LP  -- TPMT shows Intermediate activity with normal TPMT/NUDT15 genotype  -- Factor V leiden and prothrombin gene mutation negative  -- Necrotizing pancreatitis secondary to asparaginase  -- Long standing right sided gynecomastia that predated his lymphoma diagnosis.    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  He continues to live with 2 of his friends with whom he purchased a house. He has a dog, Rottweiler and English Bulldog mix named Eliot.    Current Medications:  No medications at this time.     Physical  "Exam:   Temp:  [98.3  F (36.8  C)] 98.3  F (36.8  C)  Pulse:  [78] 78  Resp:  [15] 15  BP: (129)/(71) 129/71  SpO2:  [97 %] 97 %  Wt Readings from Last 4 Encounters:   04/04/23 92.4 kg (203 lb 11.3 oz)   02/07/23 89.3 kg (196 lb 13.9 oz)   12/06/22 87.1 kg (192 lb 0.3 oz)   11/08/22 88 kg (194 lb 0.1 oz)     Ht Readings from Last 2 Encounters:   04/04/23 1.841 m (6' 0.48\")   02/07/23 1.862 m (6' 1.31\")     General: Lazaro is alert, interactive and appropriate, well-appearing, in no distress  HEENT: Skull is atrauamatic and normocephalic.  Full head of hair. PERRLA, sclera are non icteric and not injected, EOM are intact. Nares are patent with mild rhinorrhea. No sinus tenderness. Posterior oropharynx clear. No erythema or white plaques. No mucositis. MMM.  Neck:  Supple without lymphadenopathy.   Lymph: There is no cervical, supraclavicular, axillary, lymphadenopathy palpated.  Cardiovascular/chest:  Right sided gynecomastia. HR is regular, S1, S2 no murmur.  Capillary refill is < 2 seconds.   Respiratory: No cough noted. Respirations are easy. Lungs are clear to auscultation throughout.  No crackles or wheezes.  Gastrointestinal: BS present in all quadrants.  Abdomen is soft and flat.  No pain with palpation. No hepatosplenomegaly or masses are palpated.  Skin: No rash. Old port site is c/d/i, scar is wide.  Neurological:  CN 2-12 grossly intact. Gait is normal.  No issues with balance. Sensation intact in hands and feet.     Musculoskeletal: Good strength and ROM in all extremities.  5/5 strength with dorsiflexion and plantarflexion at ankles, and with  strength. No tenderness of lower back with palpation.     Labs:   Results for orders placed or performed in visit on 04/04/23   CBC with platelets and differential     Status: None   Result Value Ref Range    WBC Count 5.5 4.0 - 11.0 10e3/uL    RBC Count 5.15 4.40 - 5.90 10e6/uL    Hemoglobin 15.4 13.3 - 17.7 g/dL    Hematocrit 45.2 40.0 - 53.0 %    MCV 88 78 - " 100 fL    MCH 29.9 26.5 - 33.0 pg    MCHC 34.1 31.5 - 36.5 g/dL    RDW 12.1 10.0 - 15.0 %    Platelet Count 260 150 - 450 10e3/uL    % Neutrophils 62 %    % Lymphocytes 27 %    % Monocytes 8 %    % Eosinophils 2 %    % Basophils 1 %    % Immature Granulocytes 0 %    NRBCs per 100 WBC 0 <1 /100    Absolute Neutrophils 3.4 1.6 - 8.3 10e3/uL    Absolute Lymphocytes 1.5 0.8 - 5.3 10e3/uL    Absolute Monocytes 0.4 0.0 - 1.3 10e3/uL    Absolute Eosinophils 0.1 0.0 - 0.7 10e3/uL    Absolute Basophils 0.0 0.0 - 0.2 10e3/uL    Absolute Immature Granulocytes 0.0 <=0.4 10e3/uL    Absolute NRBCs 0.0 10e3/uL   Extra Tube     Status: None (In process)    Narrative    The following orders were created for panel order Extra Tube.  Procedure                               Abnormality         Status                     ---------                               -----------         ------                     Extra Green Top (Lithium...[[id number]]                      In process                   Please view results for these tests on the individual orders.   CBC with Platelets & Differential     Status: None    Narrative    The following orders were created for panel order CBC with Platelets & Differential.  Procedure                               Abnormality         Status                     ---------                               -----------         ------                     CBC with platelets and d...[[id number]]                      Final result                 Please view results for these tests on the individual orders.       Assessment:  Lazaro Lund is a 22 year old young man with a history of T cell lymphoblastic lymphoma (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  He was treated per COG protocol [id number]. He had a CRu following Induction with a CR at the end of Consolidation. His course was complicated by extensive bilateral DVT and severe necrotizing pancreatitis requiring cessation of PEG-asparaginase from his treatment.  He completed  therapy on 12/23/21 and comes to clinic today for an off therapy visit.  He is doing very well. Blood counts look excellent.  Has some intermittent symptoms during exercise.    Plan:   1) Labs reviewed with Lazaro, all cell lines look good.  2) Previously reviewed vaccine titers and received vaccination since his last visit with his PCP. Lazaro is still immune to: HIB, tetanus, Hep A, Rubeola, Mumps.  He is NOT immune to: polio, diptheria, rubella, pneumococcus, Hep B or varicella. He's received his first round of vaccines, he needs to return for second doses of MMR. Varicella, and first dose Hep B. Also recommend HPV vaccine and COVID vaccine since he has not received either.  He is planning return to his PCP next week for additional vaccines and to talk about exercise symptoms.   3) End of therapy echo done on 1/7/22.  Total anthracycline dose= 175mg/m2, he will need echos every 5 years (2027)  4) Discussed fertility previously. Lazaro did sperm bank but elected not to keep the specimen. He is not interested in semen analysis unless he has difficult with pregnancy in a future partner.  5) Lazaro will RTC in 2 months for follow up visit and labs. At this visit he will do a morning visit to get his glucose checked due to history of pancreatitis and recent symptoms.     Scribed by STEFANO Dorado    The documentation recorded by the scribe accurately reflects the services I personally performed and the decisions made by me.  YOHAN Dunn CNP, CNP    Total time spent on the following services on the date of the encounter: 36 minutes  Preparing to see patient with chart review    Ordering medications, labs tests, chemotherapy   Communicating with other healthcare professionals and care coordination  Interpretation of labs  Performing a medically appropriate examination   Counseling and educating the patient/family/caregiver   Communicating results to the patient/family/caregiver    Documenting clinical information in the electronic health record       Please do not hesitate to contact me if you have any questions/concerns.     Sincerely,       YOHAN Dunn CNP

## 2023-05-20 NOTE — DISCHARGE INSTRUCTIONS
Home Instructions for Your Child after Sedation  Today your child received (medicine):  Propofol and Zofran  Please keep this form with your health records  Your child may be more sleepy and irritable today than normal. Wake your child up every 1 to 11/2 hours during the day. (This way, both you and your child will sleep through the night.) Also, an adult should stay with your child for the rest of the day. The medicine may make the child dizzy. Avoid activities that require balance (bike riding, skating, climbing stairs, walking).  Remember:    When your child wants to eat again, start with liquids (juice, soda pop, Popsicles). If your child feels well enough, you may try a regular diet. It is best to offer light meals for the first 24 hours.    If your child has nausea (feels sick to the stomach) or vomiting (throws up), give small amounts of clear liquids (7-Up, Sprite, apple juice or broth). Fluids are more important than food until your child is feeling better.    Wait 24 hours before giving medicine that contains alcohol. This includes liquid cold, cough and allergy medicines (Robitussin, Vicks Formula 44 for children, Benadryl, Chlor-Trimeton).    If you will leave your child with a , give the sitter a copy of these instructions.  Call your doctor if:    You have questions about the test results.    Your child vomits (throws up) more than two times.    Your child is very fussy or irritable.    You have trouble waking your child.     If your child has trouble breathing, call [phone].  If you have any questions or concerns, please call:  Pediatric Sedation Unit [phone]  Pediatric clinic  [phone]  [facility]  [phone] (ask for the anesthesiologist doctor on call)  Emergency department [phone]  [facility] toll-free number [phone] (Monday--Friday, 8 a.m. to 4:30 p.m.)  I understand these instructions. I have all of my personal belongings.      [facility]    [phone]    Care post Lumbar Puncture     Do not remove bandage/dressing for 24 hours -- after this time they can be removed    No bath, shower or soaking of the dressing for 24 hours    Activity as tolerated by the patient    Diet as able to tolerated    May use Tylenol as needed for pain control -- DO NOT use Ibuprofen    Can apply icepack to the site for discomfort -- no more than 10 minutes at a time    If bleeding presents apply pressure for 5 minutes    Call [phone] ask for Peds BMT/Hem/Onc fellow on call if complications arise including:    persistent bleeding    fever greater than 100.5    Pain    Lumbar punctures can cause headache. If the pain is not controlled with Tylenol (acetaminophen) please call the Peds BMT/Hem/Onc fellow on call   ambulatory

## 2023-05-31 ENCOUNTER — OFFICE VISIT (OUTPATIENT)
Dept: FAMILY MEDICINE | Facility: CLINIC | Age: 23
End: 2023-05-31
Payer: COMMERCIAL

## 2023-05-31 VITALS
SYSTOLIC BLOOD PRESSURE: 126 MMHG | DIASTOLIC BLOOD PRESSURE: 74 MMHG | WEIGHT: 205 LBS | HEIGHT: 73 IN | BODY MASS INDEX: 27.17 KG/M2 | RESPIRATION RATE: 14 BRPM | HEART RATE: 60 BPM | TEMPERATURE: 97.6 F | OXYGEN SATURATION: 98 %

## 2023-05-31 DIAGNOSIS — R73.9 ELEVATED SERUM GLUCOSE: ICD-10-CM

## 2023-05-31 DIAGNOSIS — N52.9 ERECTILE DYSFUNCTION, UNSPECIFIED ERECTILE DYSFUNCTION TYPE: ICD-10-CM

## 2023-05-31 DIAGNOSIS — R73.9 ELEVATED SERUM GLUCOSE: Primary | ICD-10-CM

## 2023-05-31 DIAGNOSIS — Z00.00 ROUTINE GENERAL MEDICAL EXAMINATION AT A HEALTH CARE FACILITY: Primary | ICD-10-CM

## 2023-05-31 DIAGNOSIS — C83.50 T LYMPHOBLASTIC LYMPHOMA (H): ICD-10-CM

## 2023-05-31 DIAGNOSIS — E66.3 OVERWEIGHT (BMI 25.0-29.9): ICD-10-CM

## 2023-05-31 DIAGNOSIS — H61.21 IMPACTED CERUMEN OF RIGHT EAR: ICD-10-CM

## 2023-05-31 DIAGNOSIS — Z11.3 SCREEN FOR STD (SEXUALLY TRANSMITTED DISEASE): ICD-10-CM

## 2023-05-31 DIAGNOSIS — Z23 NEED FOR HPV VACCINATION: ICD-10-CM

## 2023-05-31 LAB
ANION GAP SERPL CALCULATED.3IONS-SCNC: 11 MMOL/L (ref 7–15)
BUN SERPL-MCNC: 13.3 MG/DL (ref 6–20)
CALCIUM SERPL-MCNC: 9.7 MG/DL (ref 8.6–10)
CHLORIDE SERPL-SCNC: 103 MMOL/L (ref 98–107)
CREAT SERPL-MCNC: 0.91 MG/DL (ref 0.67–1.17)
DEPRECATED HCO3 PLAS-SCNC: 27 MMOL/L (ref 22–29)
ERYTHROCYTE [DISTWIDTH] IN BLOOD BY AUTOMATED COUNT: 11.7 % (ref 10–15)
GFR SERPL CREATININE-BSD FRML MDRD: >90 ML/MIN/1.73M2
GLUCOSE SERPL-MCNC: 112 MG/DL (ref 70–99)
HCT VFR BLD AUTO: 45.2 % (ref 40–53)
HCV AB SERPL QL IA: NONREACTIVE
HGB BLD-MCNC: 15.2 G/DL (ref 13.3–17.7)
HIV 1+2 AB+HIV1 P24 AG SERPL QL IA: NONREACTIVE
MCH RBC QN AUTO: 29.3 PG (ref 26.5–33)
MCHC RBC AUTO-ENTMCNC: 33.6 G/DL (ref 31.5–36.5)
MCV RBC AUTO: 87 FL (ref 78–100)
PLATELET # BLD AUTO: 239 10E3/UL (ref 150–450)
POTASSIUM SERPL-SCNC: 4.3 MMOL/L (ref 3.4–5.3)
RBC # BLD AUTO: 5.18 10E6/UL (ref 4.4–5.9)
SODIUM SERPL-SCNC: 141 MMOL/L (ref 136–145)
WBC # BLD AUTO: 4 10E3/UL (ref 4–11)

## 2023-05-31 PROCEDURE — 83036 HEMOGLOBIN GLYCOSYLATED A1C: CPT | Performed by: FAMILY MEDICINE

## 2023-05-31 PROCEDURE — 87491 CHLMYD TRACH DNA AMP PROBE: CPT | Performed by: FAMILY MEDICINE

## 2023-05-31 PROCEDURE — 90471 IMMUNIZATION ADMIN: CPT | Performed by: FAMILY MEDICINE

## 2023-05-31 PROCEDURE — 36415 COLL VENOUS BLD VENIPUNCTURE: CPT | Performed by: FAMILY MEDICINE

## 2023-05-31 PROCEDURE — 80048 BASIC METABOLIC PNL TOTAL CA: CPT | Performed by: FAMILY MEDICINE

## 2023-05-31 PROCEDURE — 99214 OFFICE O/P EST MOD 30 MIN: CPT | Mod: 25 | Performed by: FAMILY MEDICINE

## 2023-05-31 PROCEDURE — 86803 HEPATITIS C AB TEST: CPT | Performed by: FAMILY MEDICINE

## 2023-05-31 PROCEDURE — 69209 REMOVE IMPACTED EAR WAX UNI: CPT | Mod: RT | Performed by: FAMILY MEDICINE

## 2023-05-31 PROCEDURE — 99395 PREV VISIT EST AGE 18-39: CPT | Mod: 25 | Performed by: FAMILY MEDICINE

## 2023-05-31 PROCEDURE — 86780 TREPONEMA PALLIDUM: CPT | Performed by: FAMILY MEDICINE

## 2023-05-31 PROCEDURE — 85027 COMPLETE CBC AUTOMATED: CPT | Performed by: FAMILY MEDICINE

## 2023-05-31 PROCEDURE — 90651 9VHPV VACCINE 2/3 DOSE IM: CPT | Performed by: FAMILY MEDICINE

## 2023-05-31 PROCEDURE — 87591 N.GONORRHOEAE DNA AMP PROB: CPT | Performed by: FAMILY MEDICINE

## 2023-05-31 PROCEDURE — 87389 HIV-1 AG W/HIV-1&-2 AB AG IA: CPT | Performed by: FAMILY MEDICINE

## 2023-05-31 RX ORDER — TADALAFIL 5 MG/1
5-10 TABLET ORAL DAILY
Qty: 30 TABLET | Refills: 3 | Status: SHIPPED | OUTPATIENT
Start: 2023-05-31 | End: 2024-01-29

## 2023-05-31 ASSESSMENT — PAIN SCALES - GENERAL: PAINLEVEL: NO PAIN (0)

## 2023-05-31 NOTE — LETTER
June 6, 2023      Lazaro DIMITRIOS Ashe[place]  [address] [facility] [id number]        Dear ,    We are writing to inform you of your test results.    -Your blood sugar was 112. This is slightly high. A second screening test for diabetes called an A1c has was added on to your blood work. This is a better screening test. A blood sugar of 112 is not yet in the level of diabetes.     -Your 3 month blood sugar average was normal- this is the A1c screening mentioned above.     -Your kidney function and electrolytes were normal.     -Your hepatitis C and HIV labs are normal.     -Your gonorrhea and chlamydia tests were normal.     Resulted Orders   Neisseria gonorrhoeae PCR   Result Value Ref Range    Neisseria gonorrhoeae Negative Negative      Comment:      Negative for N. gonorrhoeae rRNA by transcription mediated amplification. A negative result by transcription mediated amplification does not preclude the presence of C. trachomatis infection because results are dependent on proper and adequate collection, absence of inhibitors and sufficient rRNA to be detected.   Chlamydia trachomatis PCR   Result Value Ref Range    Chlamydia trachomatis Negative Negative      Comment:      A negative result by transcription mediated amplification does not preclude the presence of C. trachomatis infection because results are dependent on proper and adequate collection, absence of inhibitors and sufficient rRNA to be detected.   HIV Antigen Antibody Combo   Result Value Ref Range    HIV Antigen Antibody Combo Nonreactive Nonreactive      Comment:      HIV-1 p24 Ag & HIV-1/HIV-2 Ab Not Detected   Hepatitis C antibody   Result Value Ref Range    Hepatitis C Antibody Nonreactive Nonreactive    Narrative    Assay performance characteristics have not been established for newborns, infants, and children.   Treponema Abs w Reflex to RPR and Titer   Result Value Ref Range    Treponema Antibody Total Nonreactive Nonreactive   Basic  metabolic panel  (Ca, Cl, CO2, Creat, Gluc, K, Na, BUN)   Result Value Ref Range    Sodium 141 136 - 145 mmol/L    Potassium 4.3 3.4 - 5.3 mmol/L    Chloride 103 98 - 107 mmol/L    Carbon Dioxide (CO2) 27 22 - 29 mmol/L    Anion Gap 11 7 - 15 mmol/L    Urea Nitrogen 13.3 6.0 - 20.0 mg/dL    Creatinine 0.91 0.67 - 1.17 mg/dL    Calcium 9.7 8.6 - 10.0 mg/dL    Glucose 112 (H) 70 - 99 mg/dL    GFR Estimate >90 >60 mL/min/1.73m2      Comment:      eGFR calculated using 2021 CKD-EPI equation.   CBC with platelets   Result Value Ref Range    WBC Count 4.0 4.0 - 11.0 10e3/uL    RBC Count 5.18 4.40 - 5.90 10e6/uL    Hemoglobin 15.2 13.3 - 17.7 g/dL    Hematocrit 45.2 40.0 - 53.0 %    MCV 87 78 - 100 fL    MCH 29.3 26.5 - 33.0 pg    MCHC 33.6 31.5 - 36.5 g/dL    RDW 11.7 10.0 - 15.0 %    Platelet Count 239 150 - 450 10e3/uL       If you have any questions or concerns, please call the clinic at the number listed above.       Sincerely,      LUCY Vuong/ Raoul Wilhelm MD

## 2023-05-31 NOTE — RESULT ENCOUNTER NOTE
Please inform of results if patient has not viewed in [email or web address] within 3 business days.    CBC Results - Your cell counts were normal.    Please call the clinic with any questions you may have.     Have a great day,    Dr. Olivera

## 2023-05-31 NOTE — NURSING NOTE
Patient identified using two patient identifiers.  Ear exam showing wax occlusion completed by provider.  Solution: warm water was placed in the right ear(s) via irrigation tool: elephant ear.    Elder Sen MA on 5/31/2023 at 10:20 AM

## 2023-05-31 NOTE — RESULT ENCOUNTER NOTE
Please inform of results if patient has not viewed in [facility] within 3 business days.    BMP - Your blood sugar was 112. This is slightly high. A second screening test for diabetes called an A1c has been added on to your blood work. This is a better screening test. A blood sugar of 112 is not yet in the level of diabetes. Your kidney function and electrolytes were normal.    Your hepatitis C and HIV labs are normal.    Your other labs are pending.    Please call the clinic with any questions you may have.     Have a great day,    Dr. Olivera

## 2023-05-31 NOTE — PATIENT INSTRUCTIONS
[email or web address]    You will need a picture of your heart again in 4 years (2027)    Preventive Health Recommendations  Male Ages 21 - 25     Yearly exam:             See your health care provider every year in order to  o   Review health changes.   o   Discuss preventive care.    o   Review your medicines if your doctor has prescribed any.  You should be tested each year for STDs (sexually transmitted diseases).   Talk to your provider about cholesterol testing.    If you are at risk for diabetes, you should have a diabetes test (fasting glucose).    Shots: Get a flu shot each year. Get a tetanus shot every 10 years.     Nutrition:  Eat at least 5 servings of fruits and vegetables daily.   Eat whole-grain bread, whole-wheat pasta and brown rice instead of white grains and rice.   Get adequate calcium and Vitamin D.     Lifestyle  Exercise for at least 150 minutes a week (30 minutes a day, 5 days a week). This will help you control your weight and prevent disease.   Limit alcohol to one drink per day.   No smoking.   Wear sunscreen to prevent skin cancer.   See your dentist every six months for an exam and cleaning.

## 2023-06-01 LAB
C TRACH DNA SPEC QL NAA+PROBE: NEGATIVE
HBA1C MFR BLD: 5.4 % (ref 0–5.6)
N GONORRHOEA DNA SPEC QL NAA+PROBE: NEGATIVE
T PALLIDUM AB SER QL: NONREACTIVE

## 2023-06-01 NOTE — RESULT ENCOUNTER NOTE
Please inform of results if patient has not viewed in [facility] within 3 business days.    Your gonorrhea and chlamydia tests were normal.     Please call the clinic with any questions you may have.     Have a great day,    Dr. Olivera

## 2023-06-01 NOTE — RESULT ENCOUNTER NOTE
Please inform of results if patient has not viewed in [facility] within 3 business days.    A1c - Your 3 month blood sugar average was normal.    Please call the clinic with any questions you may have.     Have a great day,    Dr. Olivera

## 2023-06-01 NOTE — RESULT ENCOUNTER NOTE
Please inform of results if patient has not viewed in [facility] within 3 business days.    Your syphilis test was normal.    Please call the clinic with any questions you may have.     Have a great day,    Dr. Olivera

## 2023-06-06 ENCOUNTER — ONCOLOGY VISIT (OUTPATIENT)
Dept: PEDIATRIC HEMATOLOGY/ONCOLOGY | Facility: CLINIC | Age: 23
End: 2023-06-06
Attending: NURSE PRACTITIONER
Payer: COMMERCIAL

## 2023-06-06 VITALS
DIASTOLIC BLOOD PRESSURE: 68 MMHG | HEIGHT: 73 IN | OXYGEN SATURATION: 97 % | BODY MASS INDEX: 27.14 KG/M2 | WEIGHT: 204.81 LBS | TEMPERATURE: 98.4 F | SYSTOLIC BLOOD PRESSURE: 122 MMHG | RESPIRATION RATE: 16 BRPM | HEART RATE: 81 BPM

## 2023-06-06 DIAGNOSIS — C83.50 T LYMPHOBLASTIC LYMPHOMA (H): ICD-10-CM

## 2023-06-06 LAB
ANION GAP SERPL CALCULATED.3IONS-SCNC: 9 MMOL/L (ref 7–15)
BASOPHILS # BLD AUTO: 0 10E3/UL (ref 0–0.2)
BASOPHILS NFR BLD AUTO: 1 %
BUN SERPL-MCNC: 19.3 MG/DL (ref 6–20)
CALCIUM SERPL-MCNC: 9.3 MG/DL (ref 8.6–10)
CHLORIDE SERPL-SCNC: 105 MMOL/L (ref 98–107)
CREAT SERPL-MCNC: 0.84 MG/DL (ref 0.67–1.17)
DEPRECATED HCO3 PLAS-SCNC: 24 MMOL/L (ref 22–29)
EOSINOPHIL # BLD AUTO: 0.2 10E3/UL (ref 0–0.7)
EOSINOPHIL NFR BLD AUTO: 4 %
ERYTHROCYTE [DISTWIDTH] IN BLOOD BY AUTOMATED COUNT: 11.7 % (ref 10–15)
GFR SERPL CREATININE-BSD FRML MDRD: >90 ML/MIN/1.73M2
GLUCOSE SERPL-MCNC: 96 MG/DL (ref 70–99)
HCT VFR BLD AUTO: 44.4 % (ref 40–53)
HGB BLD-MCNC: 15.1 G/DL (ref 13.3–17.7)
IMM GRANULOCYTES # BLD: 0 10E3/UL
IMM GRANULOCYTES NFR BLD: 0 %
LYMPHOCYTES # BLD AUTO: 1.4 10E3/UL (ref 0.8–5.3)
LYMPHOCYTES NFR BLD AUTO: 34 %
MCH RBC QN AUTO: 30.1 PG (ref 26.5–33)
MCHC RBC AUTO-ENTMCNC: 34 G/DL (ref 31.5–36.5)
MCV RBC AUTO: 88 FL (ref 78–100)
MONOCYTES # BLD AUTO: 0.3 10E3/UL (ref 0–1.3)
MONOCYTES NFR BLD AUTO: 8 %
NEUTROPHILS # BLD AUTO: 2.2 10E3/UL (ref 1.6–8.3)
NEUTROPHILS NFR BLD AUTO: 53 %
NRBC # BLD AUTO: 0 10E3/UL
NRBC BLD AUTO-RTO: 0 /100
PLATELET # BLD AUTO: 230 10E3/UL (ref 150–450)
POTASSIUM SERPL-SCNC: 3.9 MMOL/L (ref 3.4–5.3)
RBC # BLD AUTO: 5.02 10E6/UL (ref 4.4–5.9)
SODIUM SERPL-SCNC: 138 MMOL/L (ref 136–145)
WBC # BLD AUTO: 4.1 10E3/UL (ref 4–11)

## 2023-06-06 PROCEDURE — G0463 HOSPITAL OUTPT CLINIC VISIT: HCPCS | Performed by: NURSE PRACTITIONER

## 2023-06-06 PROCEDURE — 82310 ASSAY OF CALCIUM: CPT | Performed by: NURSE PRACTITIONER

## 2023-06-06 PROCEDURE — 36415 COLL VENOUS BLD VENIPUNCTURE: CPT | Performed by: NURSE PRACTITIONER

## 2023-06-06 PROCEDURE — 99214 OFFICE O/P EST MOD 30 MIN: CPT | Performed by: NURSE PRACTITIONER

## 2023-06-06 PROCEDURE — 82374 ASSAY BLOOD CARBON DIOXIDE: CPT | Performed by: NURSE PRACTITIONER

## 2023-06-06 PROCEDURE — 85025 COMPLETE CBC W/AUTO DIFF WBC: CPT | Performed by: NURSE PRACTITIONER

## 2023-06-06 ASSESSMENT — PAIN SCALES - GENERAL: PAINLEVEL: NO PAIN (0)

## 2023-06-06 NOTE — PROGRESS NOTES
Pediatric Hematology/Oncology Clinic Note     Lazaro Lund is a 22 year old young man with a history of T-cell lymphoblastic lymphoma. Lazaro presented with acute onset cough and was found to have an anterior mediastinal mass and malignant left-sided pleural effusion.  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  He was admitted to [facility] oncology service 8/30 and started on treatment per [id number].  He had a pleural effusion that required a chest tube and a pericardial effusion that was not drained. His Induction was complicated by cardiac compression secondary to his mass leading to tamponade, this improved through out his hospital stay.  He also had dysphagia that improved with treatment of his mass, swallow study was normal. His course was complicated by extensive bilateral UE DVTs, and he was successfully treated with anticoagulation. His consolidation course was complicated by the development of severe asparaginase induced necrotizing pancreatitis. He became quite ill with SIRS and associated hypotension, he required pressor support in the ICU. As a result, asparaginase was eliminated from his treatment plan. He was in CR status at end of consolidation. During maintenance, he developed hepatotoxicity so allopurinol and dose reduced 6MP were started for correction of skewed 6-MP metabolism.  Lazaro was treated per [facility] protocol [id number] and completed therapy on 12/23/21, he comes to clinic today for an off therapy visit.    HPI:   Lazaro has been doing well since his last visit.   He was able to establish primary care which is great.  He has not had interim illness.  His energy level is great, he is hoping to start going to the gym again as he is interested in losing weight.  He is eating well, enjoys steak and mushrooms.  Recently started to have an energy drink each day.    Lazaro denies skin concerns. No pain. Is vaping, has some interest in  "quitting, his roommate is hoping to quit as well so he hopes to do it together.  He is enjoying his job and has been successful in his work, he is saving to buy a house.    Review of systems:  The 10 point Review of Systems is negative other than noted in the HPI or here.      PMH:   Past Medical History:   Diagnosis Date     Acute necrotizing pancreatitis 11/07/2019    attributed to asparaginase     Acute pancreatitis due to PEGaspariginase therapy  11/15/2019     DVT of upper extremity (deep vein thrombosis) (H) 09/26/2019    Bilateral      Edema of upper extremity 10/17/2019     Folliculitis 10/17/2019     Migraine 2006    have resolved     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   -- Spinal HA following LP  -- TPMT shows Intermediate activity with normal TPMT/NUDT15 genotype  -- Factor V leiden and prothrombin gene mutation negative  -- Necrotizing pancreatitis secondary to asparaginase  -- Long standing right sided gynecomastia that predated his lymphoma diagnosis.    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  He continues to live with 2 of his friends with whom he purchased a house. He has a dog, a Rottweiler and English Bulldog mix named Eliot.    Current Medications:  No medications at this time.     Physical Exam:   Temp:  [98.4  F (36.9  C)] 98.4  F (36.9  C)  Pulse:  [81] 81  Resp:  [16] 16  BP: (122)/(68) 122/68  SpO2:  [97 %] 97 %  Wt Readings from Last 4 Encounters:   06/06/23 92.9 kg (204 lb 12.9 oz)   05/31/23 93 kg (205 lb)   04/04/23 92.4 kg (203 lb 11.3 oz)   02/07/23 89.3 kg (196 lb 13.9 oz)     Ht Readings from Last 2 Encounters:   06/06/23 1.843 m (6' 0.56\")   05/31/23 1.846 m (6' 0.68\")     General: Lazaro is alert, " interactive and appropriate, well-appearing, in no distress  HEENT: Skull is atrauamatic and normocephalic.  Full head of hair. PERRLA, sclera are non icteric and not injected, EOM are intact. Nares are patent without rhinorrhea.  Posterior oropharynx clear. No erythema or white plaques. No mucositis. MMM.  Neck:  Supple without lymphadenopathy.   Lymph: There is no cervical, supraclavicular, axillary, lymphadenopathy palpated.  Cardiovascular/chest:   Mild right sided gynecomastia. HR is regular, S1, S2 no murmur.  Capillary refill is < 2 seconds.   Respiratory: No cough noted. Respirations are easy. Lungs are clear to auscultation throughout.  No crackles or wheezes.  Gastrointestinal: BS present in all quadrants.  Abdomen is soft and flat.  No pain with palpation. No hepatosplenomegaly or masses are palpated.  Skin: No rash. Old port site is c/d/i, scar is wide.  Neurological:  CN 2-12 grossly intact. Gait is normal.  No issues with balance. Sensation intact in hands and feet.     Musculoskeletal: Good strength and ROM in all extremities.  5/5 strength with dorsiflexion and plantarflexion at ankles, and with  strength. No tenderness of lower back with palpation.     Labs:   Results for orders placed or performed in visit on 06/06/23   Basic metabolic panel     Status: Normal   Result Value Ref Range    Sodium 138 136 - 145 mmol/L    Potassium 3.9 3.4 - 5.3 mmol/L    Chloride 105 98 - 107 mmol/L    Carbon Dioxide (CO2) 24 22 - 29 mmol/L    Anion Gap 9 7 - 15 mmol/L    Urea Nitrogen 19.3 6.0 - 20.0 mg/dL    Creatinine 0.84 0.67 - 1.17 mg/dL    Calcium 9.3 8.6 - 10.0 mg/dL    Glucose 96 70 - 99 mg/dL    GFR Estimate >90 >60 mL/min/1.73m2   CBC with platelets and differential     Status: None   Result Value Ref Range    WBC Count 4.1 4.0 - 11.0 10e3/uL    RBC Count 5.02 4.40 - 5.90 10e6/uL    Hemoglobin 15.1 13.3 - 17.7 g/dL    Hematocrit 44.4 40.0 - 53.0 %    MCV 88 78 - 100 fL    MCH 30.1 26.5 - 33.0 pg    MCHC  34.0 31.5 - 36.5 g/dL    RDW 11.7 10.0 - 15.0 %    Platelet Count 230 150 - 450 10e3/uL    % Neutrophils 53 %    % Lymphocytes 34 %    % Monocytes 8 %    % Eosinophils 4 %    % Basophils 1 %    % Immature Granulocytes 0 %    NRBCs per 100 WBC 0 <1 /100    Absolute Neutrophils 2.2 1.6 - 8.3 10e3/uL    Absolute Lymphocytes 1.4 0.8 - 5.3 10e3/uL    Absolute Monocytes 0.3 0.0 - 1.3 10e3/uL    Absolute Eosinophils 0.2 0.0 - 0.7 10e3/uL    Absolute Basophils 0.0 0.0 - 0.2 10e3/uL    Absolute Immature Granulocytes 0.0 <=0.4 10e3/uL    Absolute NRBCs 0.0 10e3/uL   CBC with Platelets & Differential     Status: None    Narrative    The following orders were created for panel order CBC with Platelets & Differential.  Procedure                               Abnormality         Status                     ---------                               -----------         ------                     CBC with platelets and d...[[id number]]                      Final result                 Please view results for these tests on the individual orders.         Assessment:  Lazaro Lund is a 22 year old young man with a history of T cell lymphoblastic lymphoma (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  He was treated per [facility] protocol [id number]. He had a CRu following Induction with a CR at the end of Consolidation. His course was complicated by extensive bilateral DVT and severe necrotizing pancreatitis requiring cessation of PEG-asparaginase from his treatment.  He completed therapy on 12/23/21 and comes to clinic today for an off therapy visit.  He is doing very well. Blood counts look excellent.  Intermittent mildly elevated glucose however A1C was normal.  He still needs to be caught up on vaccines.      Plan:   1) Labs reviewed with Lazaro, all cell lines look good.  2) Previously reviewed vaccine titers and received vaccination since his last visit with his PCP. Lazaro is still immune to: HIB, tetanus, Hep A, Rubeola, Mumps.  He is NOT immune to:  polio, diptheria, rubella, pneumococcus, Hep B or varicella. He's received his first round of vaccines, he needs to return for second doses of MMR. Varicella, and first dose Hep B series. He received his first dose of HPV.  Will help set up appt for vaccines at primary care.    3) End of therapy echo done on 1/7/22.  Total anthracycline dose= 175mg/m2, he will need echos every 5 years (2027)  4) Discussed fertility previously. Lazaro did sperm bank but elected not to keep the specimen. He is not interested in semen analysis unless he has difficult with pregnancy in a future partner.  5) Discussed that his history of pancreatitis can increase his risk of diabetes.  His A1C is in the normal range which is great however he would benefit from some lifestyle modifications including increased activity and decreased intake of high sugar foods and refined carbs that have a high glycemic index.  Discussed in detail.   5) Lazaro will RTC in 2 months for follow up visit and labs.     Félix Van CNP    Total time spent on the following services on the date of the encounter: 33 minutes  Preparing to see patient with chart review    Ordering medications, labs tests  Communicating with other healthcare professionals and care coordination  Interpretation of labs  Performing a medically appropriate examination   Counseling and educating the patient/family/caregiver   Communicating results to the patient/family/caregiver   Documenting clinical information in the electronic health record

## 2023-06-06 NOTE — LETTER
6/6/2023      RE: Lazaro Lund  [address] [facility] [id number]     Dear Colleague,    Thank you for the opportunity to participate in the care of your patient, Lazaro Lund, at the [facility] at [facility]. Please see a copy of my visit note below.    Pediatric Hematology/Oncology Clinic Note     Lazaro Lund is a 22 year old young man with a history of T-cell lymphoblastic lymphoma. Lazaro presented with acute onset cough and was found to have an anterior mediastinal mass and malignant left-sided pleural effusion.  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  He was admitted to [facility] oncology service 8/30 and started on treatment per [id number].  He had a pleural effusion that required a chest tube and a pericardial effusion that was not drained. His Induction was complicated by cardiac compression secondary to his mass leading to tamponade, this improved through out his hospital stay.  He also had dysphagia that improved with treatment of his mass, swallow study was normal. His course was complicated by extensive bilateral UE DVTs, and he was successfully treated with anticoagulation. His consolidation course was complicated by the development of severe asparaginase induced necrotizing pancreatitis. He became quite ill with SIRS and associated hypotension, he required pressor support in the ICU. As a result, asparaginase was eliminated from his treatment plan. He was in CR status at end of consolidation. During maintenance, he developed hepatotoxicity so allopurinol and dose reduced 6MP were started for correction of skewed 6-MP metabolism.  Lazaro was treated per [facility] protocol [id number] and completed therapy on 12/23/21, he comes to clinic today for an off therapy visit.    HPI:   Lazaro has been doing well since his last visit.   He was able to  establish primary care which is great.  He has not had interim illness.  His energy level is great, he is hoping to start going to the gym again as he is interested in losing weight.  He is eating well, enjoys steak and mushrooms.  Recently started to have an energy drink each day.    Lazaro denies skin concerns. No pain. Is vaping, has some interest in quitting, his roommate is hoping to quit as well so he hopes to do it together.  He is enjoying his job and has been successful in his work, he is saving to buy a house.    Review of systems:  The 10 point Review of Systems is negative other than noted in the HPI or here.      PMH:   Past Medical History:   Diagnosis Date    Acute necrotizing pancreatitis 11/07/2019    attributed to asparaginase    Acute pancreatitis due to PEGaspariginase therapy  11/15/2019    DVT of upper extremity (deep vein thrombosis) (H) 09/26/2019    Bilateral     Edema of upper extremity 10/17/2019    Folliculitis 10/17/2019    Migraine 2006    have resolved    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   -- Spinal HA following LP  -- TPMT shows Intermediate activity with normal TPMT/NUDT15 genotype  -- Factor V leiden and prothrombin gene mutation negative  -- Necrotizing pancreatitis secondary to asparaginase  -- Long standing right sided gynecomastia that predated his lymphoma diagnosis.    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  He continues to live with 2 of his friends with whom he purchased a house. He has a dog, a Rottweiler and English Bulldog mix named Eliot.    Current Medications:  No medications at this time.     Physical Exam:   Temp:  [98.4  F (36.9  C)] 98.4  F (36.9  C)  Pulse:  [81] 81  Resp:  [16] 16  BP:  "(122)/(68) 122/68  SpO2:  [97 %] 97 %  Wt Readings from Last 4 Encounters:   06/06/23 92.9 kg (204 lb 12.9 oz)   05/31/23 93 kg (205 lb)   04/04/23 92.4 kg (203 lb 11.3 oz)   02/07/23 89.3 kg (196 lb 13.9 oz)     Ht Readings from Last 2 Encounters:   06/06/23 1.843 m (6' 0.56\")   05/31/23 1.846 m (6' 0.68\")     General: Lazaro is alert, interactive and appropriate, well-appearing, in no distress  HEENT: Skull is atrauamatic and normocephalic.  Full head of hair. PERRLA, sclera are non icteric and not injected, EOM are intact. Nares are patent without rhinorrhea.  Posterior oropharynx clear. No erythema or white plaques. No mucositis. MMM.  Neck:  Supple without lymphadenopathy.   Lymph: There is no cervical, supraclavicular, axillary, lymphadenopathy palpated.  Cardiovascular/chest:   Mild right sided gynecomastia. HR is regular, S1, S2 no murmur.  Capillary refill is < 2 seconds.   Respiratory: No cough noted. Respirations are easy. Lungs are clear to auscultation throughout.  No crackles or wheezes.  Gastrointestinal: BS present in all quadrants.  Abdomen is soft and flat.  No pain with palpation. No hepatosplenomegaly or masses are palpated.  Skin: No rash. Old port site is c/d/i, scar is wide.  Neurological:  CN 2-12 grossly intact. Gait is normal.  No issues with balance. Sensation intact in hands and feet.     Musculoskeletal: Good strength and ROM in all extremities.  5/5 strength with dorsiflexion and plantarflexion at ankles, and with  strength. No tenderness of lower back with palpation.     Labs:   Results for orders placed or performed in visit on 06/06/23   Basic metabolic panel     Status: Normal   Result Value Ref Range    Sodium 138 136 - 145 mmol/L    Potassium 3.9 3.4 - 5.3 mmol/L    Chloride 105 98 - 107 mmol/L    Carbon Dioxide (CO2) 24 22 - 29 mmol/L    Anion Gap 9 7 - 15 mmol/L    Urea Nitrogen 19.3 6.0 - 20.0 mg/dL    Creatinine 0.84 0.67 - 1.17 mg/dL    Calcium 9.3 8.6 - 10.0 mg/dL    " Glucose 96 70 - 99 mg/dL    GFR Estimate >90 >60 mL/min/1.73m2   CBC with platelets and differential     Status: None   Result Value Ref Range    WBC Count 4.1 4.0 - 11.0 10e3/uL    RBC Count 5.02 4.40 - 5.90 10e6/uL    Hemoglobin 15.1 13.3 - 17.7 g/dL    Hematocrit 44.4 40.0 - 53.0 %    MCV 88 78 - 100 fL    MCH 30.1 26.5 - 33.0 pg    MCHC 34.0 31.5 - 36.5 g/dL    RDW 11.7 10.0 - 15.0 %    Platelet Count 230 150 - 450 10e3/uL    % Neutrophils 53 %    % Lymphocytes 34 %    % Monocytes 8 %    % Eosinophils 4 %    % Basophils 1 %    % Immature Granulocytes 0 %    NRBCs per 100 WBC 0 <1 /100    Absolute Neutrophils 2.2 1.6 - 8.3 10e3/uL    Absolute Lymphocytes 1.4 0.8 - 5.3 10e3/uL    Absolute Monocytes 0.3 0.0 - 1.3 10e3/uL    Absolute Eosinophils 0.2 0.0 - 0.7 10e3/uL    Absolute Basophils 0.0 0.0 - 0.2 10e3/uL    Absolute Immature Granulocytes 0.0 <=0.4 10e3/uL    Absolute NRBCs 0.0 10e3/uL   CBC with Platelets & Differential     Status: None    Narrative    The following orders were created for panel order CBC with Platelets & Differential.  Procedure                               Abnormality         Status                     ---------                               -----------         ------                     CBC with platelets and d...[[id number]]                      Final result                 Please view results for these tests on the individual orders.         Assessment:  Lazaro Lund is a 22 year old young man with a history of T cell lymphoblastic lymphoma (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  He was treated per [facility] protocol [id number]. He had a CRu following Induction with a CR at the end of Consolidation. His course was complicated by extensive bilateral DVT and severe necrotizing pancreatitis requiring cessation of PEG-asparaginase from his treatment.  He completed therapy on 12/23/21 and comes to clinic today for an off therapy visit.  He is doing very well. Blood counts look excellent.  Intermittent mildly  elevated glucose however A1C was normal.  He still needs to be caught up on vaccines.      Plan:   1) Labs reviewed with Lazaro, all cell lines look good.  2) Previously reviewed vaccine titers and received vaccination since his last visit with his PCP. Lazaro is still immune to: HIB, tetanus, Hep A, Rubeola, Mumps.  He is NOT immune to: polio, diptheria, rubella, pneumococcus, Hep B or varicella. He's received his first round of vaccines, he needs to return for second doses of MMR. Varicella, and first dose Hep B series. He received his first dose of HPV.  Will help set up appt for vaccines at primary care.    3) End of therapy echo done on 1/7/22.  Total anthracycline dose= 175mg/m2, he will need echos every 5 years (2027)  4) Discussed fertility previously. Lazaro did sperm bank but elected not to keep the specimen. He is not interested in semen analysis unless he has difficult with pregnancy in a future partner.  5) Discussed that his history of pancreatitis can increase his risk of diabetes.  His A1C is in the normal range which is great however he would benefit from some lifestyle modifications including increased activity and decreased intake of high sugar foods and refined carbs that have a high glycemic index.  Discussed in detail.   5) Lazaro will RTC in 2 months for follow up visit and labs.     Félix Van CNP    Total time spent on the following services on the date of the encounter: 33 minutes  Preparing to see patient with chart review    Ordering medications, labs tests  Communicating with other healthcare professionals and care coordination  Interpretation of labs  Performing a medically appropriate examination   Counseling and educating the patient/family/caregiver   Communicating results to the patient/family/caregiver   Documenting clinical information in the electronic health record         Please do not hesitate to contact me if you have any questions/concerns.     Sincerely,       Félix  YOHAN Jay CNP

## 2023-06-06 NOTE — NURSING NOTE
"Chief Complaint   Patient presents with     RECHECK     Pt here for TLL follow-up and labs     /68 (BP Location: Right arm, Patient Position: Sitting, Cuff Size: Adult Large)   Pulse 81   Temp 98.4  F (36.9  C) (Oral)   Resp 16   Ht 1.843 m (6' 0.56\")   Wt 92.9 kg (204 lb 12.9 oz)   SpO2 97%   BMI 27.35 kg/m      No Pain (0)  Data Unavailable    I have reviewed the patients medications and allergies    Height/weight double check needed? No    Peds Outpatient BP  1) Rested for 5 minutes, BP taken on bare arm, patient sitting (or supine for infants) w/ legs uncrossed?   Yes  2) Right arm used?  Right arm   Yes  3) Arm circumference of largest part of upper arm (in cm): 33cm  4) BP cuff sized used: Large Adult (32-43cm)   If used different size cuff then what was recommended why? N/A  5) First BP reading:machine   BP Readings from Last 1 Encounters:   06/06/23 122/68      Is reading >90%?No   (90% for <1 years is 90/50)  (90% for >18 years is 140/90)  *If a machine BP is at or above 90% take manual BP  6) Manual BP reading: N/A  7) Other comments: None          Andrews Chavez, EMT  June 6, 2023  "

## 2023-06-08 DIAGNOSIS — Z23 NEED FOR MMRV (MEASLES-MUMPS-RUBELLA-VARICELLA) VACCINE/PROQUAD VACCINATION: Primary | ICD-10-CM

## 2023-06-08 DIAGNOSIS — Z23 NEED FOR HPV VACCINATION: ICD-10-CM

## 2023-06-08 DIAGNOSIS — Z23 NEED FOR HEPATITIS B VACCINATION: ICD-10-CM

## 2023-06-15 ENCOUNTER — TELEPHONE (OUTPATIENT)
Dept: PEDIATRIC HEMATOLOGY/ONCOLOGY | Facility: CLINIC | Age: 23
End: 2023-06-15
Payer: COMMERCIAL

## 2023-06-15 NOTE — TELEPHONE ENCOUNTER
Attempt x2 to reach Lazaro about scheduling vaccines with PCP office. An appt had been scheduled but Lazaro didn't arrive for appt. Attempting to help with rescheduling and to clarify any possible questions. #waywire message also sent with information for Lazaro to reschedule on his own or to let me know if he would like my assistance. Encouraged a reply to #waywire or a return phone call; direct contact number provided on VM message.

## 2023-07-12 ENCOUNTER — TELEPHONE (OUTPATIENT)
Dept: PEDIATRIC HEMATOLOGY/ONCOLOGY | Facility: CLINIC | Age: 23
End: 2023-07-12
Payer: COMMERCIAL

## 2023-07-12 NOTE — TELEPHONE ENCOUNTER
Spoke to Lazaro about getting his last vaccines rescheduled at the [facility]. Lazaro gave dates/times he would be available - appt was scheduled for vaccines. Reviewed appt details with Lazaro. Lazaro was in agreement of plan. Encouraged him to call with questions/concerns and phone number provided.

## 2023-07-27 ENCOUNTER — ALLIED HEALTH/NURSE VISIT (OUTPATIENT)
Dept: FAMILY MEDICINE | Facility: OTHER | Age: 23
End: 2023-07-27
Payer: COMMERCIAL

## 2023-07-27 DIAGNOSIS — Z23 NEED FOR MMRV (MEASLES-MUMPS-RUBELLA-VARICELLA) VACCINE/PROQUAD VACCINATION: ICD-10-CM

## 2023-07-27 DIAGNOSIS — Z23 NEED FOR HEPATITIS B VACCINATION: ICD-10-CM

## 2023-07-27 DIAGNOSIS — Z23 NEED FOR HPV VACCINATION: ICD-10-CM

## 2023-07-27 PROCEDURE — 90472 IMMUNIZATION ADMIN EACH ADD: CPT

## 2023-07-27 PROCEDURE — 99207 PR NO CHARGE NURSE ONLY: CPT

## 2023-07-27 PROCEDURE — 90710 MMRV VACCINE SC: CPT

## 2023-07-27 PROCEDURE — 90471 IMMUNIZATION ADMIN: CPT

## 2023-07-27 PROCEDURE — 90651 9VHPV VACCINE 2/3 DOSE IM: CPT

## 2023-07-27 PROCEDURE — 90746 HEPB VACCINE 3 DOSE ADULT IM: CPT

## 2023-07-27 NOTE — PROGRESS NOTES
Prior to immunization administration, verified patients identity using patient s name and date of birth. Please see Immunization Activity for additional information.     Screening Questionnaire for Adult Immunization    Are you sick today?   No   Do you have allergies to medications, food, a vaccine component or latex?   No   Have you ever had a serious reaction after receiving a vaccination?   No   Do you have a long-term health problem with heart, lung, kidney, or metabolic disease (e.g., diabetes), asthma, a blood disorder, no spleen, complement component deficiency, a cochlear implant, or a spinal fluid leak?  Are you on long-term aspirin therapy?   No   Do you have cancer, leukemia, HIV/AIDS, or any other immune system problem?   No   Do you have a parent, brother, or sister with an immune system problem?   No   In the past 3 months, have you taken medications that affect  your immune system, such as prednisone, other steroids, or anticancer drugs; drugs for the treatment of rheumatoid arthritis, Crohn s disease, or psoriasis; or have you had radiation treatments?   No   Have you had a seizure, or a brain or other nervous system problem?   No   During the past year, have you received a transfusion of blood or blood    products, or been given immune (gamma) globulin or antiviral drug?   No   For women: Are you pregnant or is there a chance you could become       pregnant during the next month?   No   Have you received any vaccinations in the past 4 weeks?   No     Immunization questionnaire answers were all negative.    I have reviewed the following standing orders:   This patient is due and qualifies for the Hepatitis B vaccine.    Click here for Hepatitis B Standing Order    I have reviewed the vaccines inclusion and exclusion criteria; No concerns regarding eligibility.         This patient is due and qualifies for the HPV vaccine.    Click here for HPV (Adult 15-45Y) Standing Order     I have reviewed the  vaccines inclusion and exclusion criteria;No concerns regarding eligibility.           This patient is due and qualifies for the MMR vaccine.    Click here for full standing order document: MMR Adult Standing Order     I have reviewed the vaccines inclusion and exclusion criteria;No concerns regarding eligibility.         This patient is due and qualifies for the Varicella vaccine.    Click here for Varicella (Adult) Standing Order    I have reviewed the vaccines inclusion and exclusion criteria; No concerns regarding eligibility.     Patient instructed to remain in clinic for 15 minutes afterwards, and to report any adverse reactions.     Screening performed by Arelis Zheng MA on 7/27/2023 at 12:01 PM.

## 2023-08-08 ENCOUNTER — ONCOLOGY VISIT (OUTPATIENT)
Dept: PEDIATRIC HEMATOLOGY/ONCOLOGY | Facility: CLINIC | Age: 23
End: 2023-08-08
Attending: NURSE PRACTITIONER
Payer: COMMERCIAL

## 2023-08-08 VITALS
HEIGHT: 73 IN | BODY MASS INDEX: 26.44 KG/M2 | OXYGEN SATURATION: 98 % | WEIGHT: 199.52 LBS | SYSTOLIC BLOOD PRESSURE: 116 MMHG | HEART RATE: 78 BPM | DIASTOLIC BLOOD PRESSURE: 74 MMHG | RESPIRATION RATE: 16 BRPM | TEMPERATURE: 98.4 F

## 2023-08-08 DIAGNOSIS — C83.50 T LYMPHOBLASTIC LYMPHOMA (H): ICD-10-CM

## 2023-08-08 LAB
BASOPHILS # BLD AUTO: 0.1 10E3/UL (ref 0–0.2)
BASOPHILS NFR BLD AUTO: 1 %
EOSINOPHIL # BLD AUTO: 0.1 10E3/UL (ref 0–0.7)
EOSINOPHIL NFR BLD AUTO: 3 %
ERYTHROCYTE [DISTWIDTH] IN BLOOD BY AUTOMATED COUNT: 11.4 % (ref 10–15)
HCT VFR BLD AUTO: 45.7 % (ref 40–53)
HGB BLD-MCNC: 16.2 G/DL (ref 13.3–17.7)
IMM GRANULOCYTES # BLD: 0 10E3/UL
IMM GRANULOCYTES NFR BLD: 0 %
LYMPHOCYTES # BLD AUTO: 1.5 10E3/UL (ref 0.8–5.3)
LYMPHOCYTES NFR BLD AUTO: 28 %
MCH RBC QN AUTO: 30.5 PG (ref 26.5–33)
MCHC RBC AUTO-ENTMCNC: 35.4 G/DL (ref 31.5–36.5)
MCV RBC AUTO: 86 FL (ref 78–100)
MONOCYTES # BLD AUTO: 0.4 10E3/UL (ref 0–1.3)
MONOCYTES NFR BLD AUTO: 6 %
NEUTROPHILS # BLD AUTO: 3.5 10E3/UL (ref 1.6–8.3)
NEUTROPHILS NFR BLD AUTO: 62 %
NRBC # BLD AUTO: 0 10E3/UL
NRBC BLD AUTO-RTO: 0 /100
PLATELET # BLD AUTO: 293 10E3/UL (ref 150–450)
RBC # BLD AUTO: 5.32 10E6/UL (ref 4.4–5.9)
WBC # BLD AUTO: 5.6 10E3/UL (ref 4–11)

## 2023-08-08 PROCEDURE — 36415 COLL VENOUS BLD VENIPUNCTURE: CPT | Performed by: NURSE PRACTITIONER

## 2023-08-08 PROCEDURE — G0463 HOSPITAL OUTPT CLINIC VISIT: HCPCS | Performed by: NURSE PRACTITIONER

## 2023-08-08 PROCEDURE — 99214 OFFICE O/P EST MOD 30 MIN: CPT | Performed by: NURSE PRACTITIONER

## 2023-08-08 PROCEDURE — 85025 COMPLETE CBC W/AUTO DIFF WBC: CPT | Performed by: NURSE PRACTITIONER

## 2023-08-08 ASSESSMENT — PAIN SCALES - GENERAL: PAINLEVEL: NO PAIN (0)

## 2023-08-08 NOTE — LETTER
8/8/2023      RE: Lazaro Lund  [address] [facility] [id number]     Dear Colleague,    Thank you for the opportunity to participate in the care of your patient, Lazaro Lund, at the [facility] at [facility]. Please see a copy of my visit note below.    Pediatric Hematology/Oncology Clinic Note     Lazaro Lund is a 22 year old young man with a history of T-cell lymphoblastic lymphoma. Lazaro presented with acute onset cough and was found to have an anterior mediastinal mass and malignant left-sided pleural effusion.  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  He was admitted to [facility] oncology service 8/30 and started on treatment per [id number].  He had a pleural effusion that required a chest tube and a pericardial effusion that was not drained. His Induction was complicated by cardiac compression secondary to his mass leading to tamponade, this improved through out his hospital stay.  He also had dysphagia that improved with treatment of his mass, swallow study was normal. His course was complicated by extensive bilateral UE DVTs, and he was successfully treated with anticoagulation. His consolidation course was complicated by the development of severe asparaginase induced necrotizing pancreatitis. He became quite ill with SIRS and associated hypotension, he required pressor support in the ICU. As a result, asparaginase was eliminated from his treatment plan. He was in CR status at end of consolidation. During maintenance, he developed hepatotoxicity so allopurinol and dose reduced 6MP were started for correction of skewed 6-MP metabolism.  Lazaro was treated per [facility] protocol [id number] and completed therapy on 12/23/21, he comes to clinic today for an off therapy visit.    HPI:   Lazaro has been doing well since his last visit.   He was able to  establish primary care and continue to get caught up on vaccines.  He's enjoying his job and got to travel to [place] for a conference which he really enjoyed.    Lazaro has not had fever or interim illness.  Energy level is strong.  He went to the gym daily for about a month but now got out of the habit again, he plans to restart.  He is eating well, no GI upset.  Continues to vape.  He denies skin concerns.  No pain.      Review of systems:  The 10 point Review of Systems is negative other than noted in the HPI or here.      PMH:   Past Medical History:   Diagnosis Date    Acute necrotizing pancreatitis 11/07/2019    attributed to asparaginase    Acute pancreatitis due to PEGaspariginase therapy  11/15/2019    DVT of upper extremity (deep vein thrombosis) (H) 09/26/2019    Bilateral     Edema of upper extremity 10/17/2019    Folliculitis 10/17/2019    Migraine 2006    have resolved    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   -- Spinal HA following LP  -- TPMT shows Intermediate activity with normal TPMT/NUDT15 genotype  -- Factor V leiden and prothrombin gene mutation negative  -- Necrotizing pancreatitis secondary to asparaginase  -- Long standing right sided gynecomastia that predated his lymphoma diagnosis.    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  He continues to live with 2 of his friends with whom he purchased a house. He has a dog, a Rottweiler and English Bulldog mix named Eliot.  He recently got his brother a job at his workplace.    Current Medications:  No medications at this time.     Physical Exam:   Temp:  [98.4  F (36.9  C)] 98.4  F (36.9  C)  Pulse:  [78] 78  Resp:  [16] 16  BP: (116)/(74) 116/74  SpO2:  [98 %] 98 %  Wt  "Readings from Last 4 Encounters:   08/08/23 90.5 kg (199 lb 8.3 oz)   06/06/23 92.9 kg (204 lb 12.9 oz)   05/31/23 93 kg (205 lb)   04/04/23 92.4 kg (203 lb 11.3 oz)     Ht Readings from Last 2 Encounters:   08/08/23 1.843 m (6' 0.56\")   06/06/23 1.843 m (6' 0.56\")     General: Lazaro is alert, interactive and appropriate, well-appearing, in no distress  HEENT: Skull is atrauamatic and normocephalic.  Full head of hair. PERRLA, sclera are non icteric and not injected, EOM are intact. Nares are patent without rhinorrhea.  Posterior oropharynx clear. No erythema or white plaques. No mucositis. MMM.  Neck:  Supple without lymphadenopathy.   Lymph: There is no cervical, supraclavicular, axillary, lymphadenopathy palpated.  Cardiovascular/chest:   Mild right sided gynecomastia. HR is regular, S1, S2 no murmur.  Capillary refill is < 2 seconds.   Respiratory: No cough noted. Respirations are easy. Lungs are clear to auscultation throughout.  No crackles or wheezes.  Gastrointestinal: BS present in all quadrants.  Abdomen is soft and flat.  No pain with palpation. No hepatosplenomegaly or masses are palpated.  Skin: No rash. Old port site is c/d/i, scar is wide.  Neurological:  CN 2-12 grossly intact. Gait is normal.  No issues with balance. Sensation intact in hands and feet.     Musculoskeletal: Good strength and ROM in all extremities.  5/5 strength with dorsiflexion and plantarflexion at ankles, and with  strength.      Labs:   Results for orders placed or performed in visit on 08/08/23   CBC with platelets and differential     Status: None   Result Value Ref Range    WBC Count 5.6 4.0 - 11.0 10e3/uL    RBC Count 5.32 4.40 - 5.90 10e6/uL    Hemoglobin 16.2 13.3 - 17.7 g/dL    Hematocrit 45.7 40.0 - 53.0 %    MCV 86 78 - 100 fL    MCH 30.5 26.5 - 33.0 pg    MCHC 35.4 31.5 - 36.5 g/dL    RDW 11.4 10.0 - 15.0 %    Platelet Count 293 150 - 450 10e3/uL    % Neutrophils 62 %    % Lymphocytes 28 %    % Monocytes 6 %    % " Eosinophils 3 %    % Basophils 1 %    % Immature Granulocytes 0 %    NRBCs per 100 WBC 0 <1 /100    Absolute Neutrophils 3.5 1.6 - 8.3 10e3/uL    Absolute Lymphocytes 1.5 0.8 - 5.3 10e3/uL    Absolute Monocytes 0.4 0.0 - 1.3 10e3/uL    Absolute Eosinophils 0.1 0.0 - 0.7 10e3/uL    Absolute Basophils 0.1 0.0 - 0.2 10e3/uL    Absolute Immature Granulocytes 0.0 <=0.4 10e3/uL    Absolute NRBCs 0.0 10e3/uL   CBC with Platelets & Differential     Status: None    Narrative    The following orders were created for panel order CBC with Platelets & Differential.  Procedure                               Abnormality         Status                     ---------                               -----------         ------                     CBC with platelets and d...[[id number]]                      Final result                 Please view results for these tests on the individual orders.         Assessment:  Lazaro Lund is a 22 year old young man with a history of T cell lymphoblastic lymphoma (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  He was treated per [facility] protocol [id number]. He had a CRu following Induction with a CR at the end of Consolidation. His course was complicated by extensive bilateral DVT and severe necrotizing pancreatitis requiring cessation of PEG-asparaginase from his treatment.  He completed therapy on 12/23/21 and comes to clinic today for an off therapy visit.  He is doing very well. Blood counts look excellent.  Intermittent mildly elevated glucose however A1C was normal.  He is getting caught up on vaccines.      Plan:   1) Labs reviewed with Lazaro, all cell lines look good.  2) Previously reviewed vaccine titers and received vaccination since his last visit with his PCP.  He is fully caught up with the exception of his 2nd and 3rd Hep B and 3rd HPV.   3) End of therapy echo done on 1/7/22.  Total anthracycline dose= 175mg/m2, he will need echos every 5 years (2027)  4) Discussed fertility previously. Lazaro did sperm  bank but elected not to keep the specimen. He is not interested in semen analysis unless he has difficult with pregnancy in a future partner.  5) Discussed that his history of pancreatitis can increase his risk of diabetes.  His A1C is in the normal range which is great however he would benefit from some lifestyle modifications including increased activity and decreased intake of high sugar foods and refined carbs that have a high glycemic index.    5) Lazaro will RTC in 2 months for follow up visit and labs.     Félix Van CNP    Total time spent on the following services on the date of the encounter: 35 minutes  Preparing to see patient with chart review    Ordering medications, labs tests  Communicating with other healthcare professionals and care coordination  Interpretation of labs  Performing a medically appropriate examination   Counseling and educating the patient/family/caregiver   Communicating results to the patient/family/caregiver   Documenting clinical information in the electronic health record       Please do not hesitate to contact me if you have any questions/concerns.     Sincerely,       YOHAN Dunn CNP

## 2023-08-08 NOTE — NURSING NOTE
"Chief Complaint   Patient presents with    RECHECK     Patient here today for follow up     /74 (BP Location: Right arm, Patient Position: Sitting, Cuff Size: Adult Large)   Pulse 78   Temp 98.4  F (36.9  C) (Oral)   Resp 16   Ht 1.843 m (6' 0.56\")   Wt 90.5 kg (199 lb 8.3 oz)   SpO2 98%   BMI 26.64 kg/m      No Pain (0)  Data Unavailable    I have reviewed the patients medications and allergies    Height/weight double check needed? No    Peds Outpatient BP  1) Rested for 5 minutes, BP taken on bare arm, patient sitting (or supine for infants) w/ legs uncrossed?   Yes  2) Right arm used?  Right arm   Yes  3) Arm circumference of largest part of upper arm (in cm): 34cm  4) BP cuff sized used: Large Adult (32-43cm)   If used different size cuff then what was recommended why? N/A  5) First BP reading:machine   BP Readings from Last 1 Encounters:   08/08/23 116/74      Is reading >90%?No   (90% for <1 years is 90/50)  (90% for >18 years is 140/90)  *If a machine BP is at or above 90% take manual BP  6) Manual BP reading: N/A  7) Other comments: None          Xenia Joe CMA  August 8, 2023    "

## 2023-08-08 NOTE — PROGRESS NOTES
Pediatric Hematology/Oncology Clinic Note     Lazaro Lund is a 22 year old young man with a history of T-cell lymphoblastic lymphoma. Lazaro presented with acute onset cough and was found to have an anterior mediastinal mass and malignant left-sided pleural effusion.  A CT guided biopsy was obtained at [facility] and pathology was consistent with T-cell leukemia vs lymphoma.  He was admitted to [facility] oncology service 8/30 and started on treatment per [id number].  He had a pleural effusion that required a chest tube and a pericardial effusion that was not drained. His Induction was complicated by cardiac compression secondary to his mass leading to tamponade, this improved through out his hospital stay.  He also had dysphagia that improved with treatment of his mass, swallow study was normal. His course was complicated by extensive bilateral UE DVTs, and he was successfully treated with anticoagulation. His consolidation course was complicated by the development of severe asparaginase induced necrotizing pancreatitis. He became quite ill with SIRS and associated hypotension, he required pressor support in the ICU. As a result, asparaginase was eliminated from his treatment plan. He was in CR status at end of consolidation. During maintenance, he developed hepatotoxicity so allopurinol and dose reduced 6MP were started for correction of skewed 6-MP metabolism.  Lazaro was treated per [facility] protocol [id number] and completed therapy on 12/23/21, he comes to clinic today for an off therapy visit.    HPI:   Lazaro has been doing well since his last visit.   He was able to establish primary care and continue to get caught up on vaccines.  He's enjoying his job and got to travel to [place] for a conference which he really enjoyed.    Lazaro has not had fever or interim illness.  Energy level is strong.  He went to the gym daily for about a month but now got out of the habit again, he plans to restart.  He is eating well,  "no GI upset.  Continues to vape.  He denies skin concerns.  No pain.      Review of systems:  The 10 point Review of Systems is negative other than noted in the HPI or here.      PMH:   Past Medical History:   Diagnosis Date    Acute necrotizing pancreatitis 11/07/2019    attributed to asparaginase    Acute pancreatitis due to PEGaspariginase therapy  11/15/2019    DVT of upper extremity (deep vein thrombosis) (H) 09/26/2019    Bilateral     Edema of upper extremity 10/17/2019    Folliculitis 10/17/2019    Migraine 2006    have resolved    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   -- Spinal HA following LP  -- TPMT shows Intermediate activity with normal TPMT/NUDT15 genotype  -- Factor V leiden and prothrombin gene mutation negative  -- Necrotizing pancreatitis secondary to asparaginase  -- Long standing right sided gynecomastia that predated his lymphoma diagnosis.    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  He continues to live with 2 of his friends with whom he purchased a house. He has a dog, a Rottweiler and English Bulldog mix named SignalSet.  He recently got his brother a job at his workplace.    Current Medications:  No medications at this time.     Physical Exam:   Temp:  [98.4  F (36.9  C)] 98.4  F (36.9  C)  Pulse:  [78] 78  Resp:  [16] 16  BP: (116)/(74) 116/74  SpO2:  [98 %] 98 %  Wt Readings from Last 4 Encounters:   08/08/23 90.5 kg (199 lb 8.3 oz)   06/06/23 92.9 kg (204 lb 12.9 oz)   05/31/23 93 kg (205 lb)   04/04/23 92.4 kg (203 lb 11.3 oz)     Ht Readings from Last 2 Encounters:   08/08/23 1.843 m (6' 0.56\")   06/06/23 1.843 m (6' 0.56\")     General: Lazaro is alert, interactive and appropriate, well-appearing, in no " distress  HEENT: Skull is atrauamatic and normocephalic.  Full head of hair. PERRLA, sclera are non icteric and not injected, EOM are intact. Nares are patent without rhinorrhea.  Posterior oropharynx clear. No erythema or white plaques. No mucositis. MMM.  Neck:  Supple without lymphadenopathy.   Lymph: There is no cervical, supraclavicular, axillary, lymphadenopathy palpated.  Cardiovascular/chest:   Mild right sided gynecomastia. HR is regular, S1, S2 no murmur.  Capillary refill is < 2 seconds.   Respiratory: No cough noted. Respirations are easy. Lungs are clear to auscultation throughout.  No crackles or wheezes.  Gastrointestinal: BS present in all quadrants.  Abdomen is soft and flat.  No pain with palpation. No hepatosplenomegaly or masses are palpated.  Skin: No rash. Old port site is c/d/i, scar is wide.  Neurological:  CN 2-12 grossly intact. Gait is normal.  No issues with balance. Sensation intact in hands and feet.     Musculoskeletal: Good strength and ROM in all extremities.  5/5 strength with dorsiflexion and plantarflexion at ankles, and with  strength.      Labs:   Results for orders placed or performed in visit on 08/08/23   CBC with platelets and differential     Status: None   Result Value Ref Range    WBC Count 5.6 4.0 - 11.0 10e3/uL    RBC Count 5.32 4.40 - 5.90 10e6/uL    Hemoglobin 16.2 13.3 - 17.7 g/dL    Hematocrit 45.7 40.0 - 53.0 %    MCV 86 78 - 100 fL    MCH 30.5 26.5 - 33.0 pg    MCHC 35.4 31.5 - 36.5 g/dL    RDW 11.4 10.0 - 15.0 %    Platelet Count 293 150 - 450 10e3/uL    % Neutrophils 62 %    % Lymphocytes 28 %    % Monocytes 6 %    % Eosinophils 3 %    % Basophils 1 %    % Immature Granulocytes 0 %    NRBCs per 100 WBC 0 <1 /100    Absolute Neutrophils 3.5 1.6 - 8.3 10e3/uL    Absolute Lymphocytes 1.5 0.8 - 5.3 10e3/uL    Absolute Monocytes 0.4 0.0 - 1.3 10e3/uL    Absolute Eosinophils 0.1 0.0 - 0.7 10e3/uL    Absolute Basophils 0.1 0.0 - 0.2 10e3/uL    Absolute Immature  Granulocytes 0.0 <=0.4 10e3/uL    Absolute NRBCs 0.0 10e3/uL   CBC with Platelets & Differential     Status: None    Narrative    The following orders were created for panel order CBC with Platelets & Differential.  Procedure                               Abnormality         Status                     ---------                               -----------         ------                     CBC with platelets and d...[[id number]]                      Final result                 Please view results for these tests on the individual orders.         Assessment:  Lazaro Lund is a 22 year old young man with a history of T cell lymphoblastic lymphoma (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  He was treated per [facility] protocol [id number]. He had a CRu following Induction with a CR at the end of Consolidation. His course was complicated by extensive bilateral DVT and severe necrotizing pancreatitis requiring cessation of PEG-asparaginase from his treatment.  He completed therapy on 12/23/21 and comes to clinic today for an off therapy visit.  He is doing very well. Blood counts look excellent.  Intermittent mildly elevated glucose however A1C was normal.  He is getting caught up on vaccines.      Plan:   1) Labs reviewed with Lazaro, all cell lines look good.  2) Previously reviewed vaccine titers and received vaccination since his last visit with his PCP.  He is fully caught up with the exception of his 2nd and 3rd Hep B and 3rd HPV.   3) End of therapy echo done on 1/7/22.  Total anthracycline dose= 175mg/m2, he will need echos every 5 years (2027)  4) Discussed fertility previously. Lazaro did sperm bank but elected not to keep the specimen. He is not interested in semen analysis unless he has difficult with pregnancy in a future partner.  5) Discussed that his history of pancreatitis can increase his risk of diabetes.  His A1C is in the normal range which is great however he would benefit from some lifestyle modifications including  increased activity and decreased intake of high sugar foods and refined carbs that have a high glycemic index.    5) Lazaro will RTC in 2 months for follow up visit and labs.     Félix Van CNP    Total time spent on the following services on the date of the encounter: 35 minutes  Preparing to see patient with chart review    Ordering medications, labs tests  Communicating with other healthcare professionals and care coordination  Interpretation of labs  Performing a medically appropriate examination   Counseling and educating the patient/family/caregiver   Communicating results to the patient/family/caregiver   Documenting clinical information in the electronic health record

## 2023-10-10 ENCOUNTER — APPOINTMENT (OUTPATIENT)
Dept: LAB | Facility: CLINIC | Age: 23
End: 2023-10-10
Payer: COMMERCIAL

## 2023-10-13 ENCOUNTER — VIRTUAL VISIT (OUTPATIENT)
Dept: PEDIATRIC HEMATOLOGY/ONCOLOGY | Facility: CLINIC | Age: 23
End: 2023-10-13
Attending: NURSE PRACTITIONER
Payer: COMMERCIAL

## 2023-10-13 VITALS — WEIGHT: 190 LBS | BODY MASS INDEX: 25.73 KG/M2 | HEIGHT: 72 IN

## 2023-10-13 DIAGNOSIS — C83.50 T LYMPHOBLASTIC LYMPHOMA (H): Primary | ICD-10-CM

## 2023-10-13 PROCEDURE — 99214 OFFICE O/P EST MOD 30 MIN: CPT | Mod: VID | Performed by: NURSE PRACTITIONER

## 2023-10-13 ASSESSMENT — PAIN SCALES - GENERAL: PAINLEVEL: NO PAIN (0)

## 2023-10-13 NOTE — NURSING NOTE
Is the patient currently in the state of [place]? YES    Visit mode:VIDEO    If the visit is dropped, the patient can be reconnected by: VIDEO VISIT: Text to cell phone:   Telephone Information:   Mobile [phone]       Will anyone else be joining the visit? NO  (If patient encounters technical issues they should call [phone]    How would you like to obtain your AVS? MyChart    Are changes needed to the allergy or medication list? No    Reason for visit: RECHAMIRA DUMASF

## 2023-10-13 NOTE — LETTER
10/13/2023      RE: Lazaro uLnd  [address] [facility] [address]     Dear Colleague,    Thank you for the opportunity to participate in the care of your patient, Lazaro Lund, at the [facility] at [facility]. Please see a copy of my visit note below.    Pediatric Hematology/Oncology Clinic Note     Lazaro Lund is a 22 year old young man with a history of T-cell lymphoblastic lymphoma. Lazaro presented with acute onset cough and was found to have an anterior mediastinal mass and malignant left-sided pleural effusion.  A CT guided biopsy was obtained at [facility] and pathology was consistent with T-cell leukemia vs lymphoma.  He was admitted to [facility] oncology service 8/30 and started on treatment per [id number].  He had a pleural effusion that required a chest tube and a pericardial effusion that was not drained. His Induction was complicated by cardiac compression secondary to his mass leading to tamponade, this improved through out his hospital stay.  He also had dysphagia that improved with treatment of his mass, swallow study was normal. His course was complicated by extensive bilateral UE DVTs, and he was successfully treated with anticoagulation. His consolidation course was complicated by the development of severe asparaginase induced necrotizing pancreatitis. He became quite ill with SIRS and associated hypotension, he required pressor support in the ICU. As a result, asparaginase was eliminated from his treatment plan. He was in CR status at end of consolidation. During maintenance, he developed hepatotoxicity so allopurinol and dose reduced 6MP were started for correction of skewed 6-MP metabolism.  Lazaro was treated per [facility] protocol [id number] and completed therapy on 12/23/21, he comes to clinic today for an off therapy visit.  He is seen via a billable video visit.        Video-Visit  Details    Type of service:  Video Visit   Video Start Time:  9:02am  Video End Time: 9:15am    Originating Location (pt. Location): Home    Distant Location (provider location):  On-site  Platform used for Video Visit: Sammy      HPI:   Lazaro has been doing well since his last visit.  He has not had any major illnesses, no fevers.  He has some questions about Hep B and HPV vaccines and if he should abstain from sexual activity until he has been caught up on those vaccinations.      Lazaro's appetite is strong.  He denies GI upset.  No skin concerns.  He denies pain.  He has been very busy at work which has been challenging to balance.     Lazaro is working out regularly at [employer]. Continues to vape.     Review of systems:  The 10 point Review of Systems is negative other than noted in the HPI or here.      PMH:   Past Medical History:   Diagnosis Date    Acute necrotizing pancreatitis 11/07/2019    attributed to asparaginase    Acute pancreatitis due to PEGaspariginase therapy  11/15/2019    DVT of upper extremity (deep vein thrombosis) (H) 09/26/2019    Bilateral     Edema of upper extremity 10/17/2019    Folliculitis 10/17/2019    Migraine 2006    have resolved    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   -- Spinal HA following LP  -- TPMT shows Intermediate activity with normal TPMT/NUDT15 genotype  -- Factor V leiden and prothrombin gene mutation negative  -- Necrotizing pancreatitis secondary to asparaginase  -- Long standing right sided gynecomastia that predated his lymphoma diagnosis.    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  He continues to live with 2 of his friends with whom he purchased a house. He has a dog, a Rottweiler  "and English Bulldog mix named Eliot.  He recently got his brother a job at his workplace.    Current Medications:  No medications at this time.     Physical Exam:      Wt Readings from Last 4 Encounters:   10/13/23 86.2 kg (190 lb)   08/08/23 90.5 kg (199 lb 8.3 oz)   06/06/23 92.9 kg (204 lb 12.9 oz)   05/31/23 93 kg (205 lb)     Ht Readings from Last 2 Encounters:   10/13/23 1.829 m (6')   08/08/23 1.843 m (6' 0.56\")     Physical Exam   GENERAL: Healthy, alert and no distress  EYES: Eyes grossly normal to inspection.  No discharge or erythema, or obvious scleral/conjunctival abnormalities.  RESP: No audible wheeze, cough, or visible cyanosis.  No visible retractions or increased work of breathing.    SKIN: Visible skin clear. No significant rash, abnormal pigmentation or lesions.  NEURO: Cranial nerves grossly intact.  Mentation and speech appropriate for age.  PSYCH: Mentation appears normal, affect normal/bright, judgement and insight intact, normal speech and appearance well-groomed.     Labs:    Latest Reference Range & Units 10/10/23 15:25   WBC 4.0 - 11.0 10e3/uL 5.6   Hemoglobin 13.3 - 17.7 g/dL 15.9   Hematocrit 40.0 - 53.0 % 45.7   Platelet Count 150 - 450 10e3/uL 314   RBC Count 4.40 - 5.90 10e6/uL 5.25   MCV 78 - 100 fL 87   MCH 26.5 - 33.0 pg 30.3   MCHC 31.5 - 36.5 g/dL 34.8   RDW 10.0 - 15.0 % 12.0   % Neutrophils % 67   % Lymphocytes % 23   % Monocytes % 6   % Eosinophils % 3   % Basophils % 1   Absolute Basophils 0.0 - 0.2 10e3/uL 0.0   Absolute Eosinophils 0.0 - 0.7 10e3/uL 0.2   Absolute Immature Granulocytes <=0.4 10e3/uL 0.0   Absolute Lymphocytes 0.8 - 5.3 10e3/uL 1.3   Absolute Monocytes 0.0 - 1.3 10e3/uL 0.4   % Immature Granulocytes % 0   Absolute Neutrophils 1.6 - 8.3 10e3/uL 3.8   Absolute NRBCs 10e3/uL 0.0   NRBCs per 100 WBC <1 /100 0       Assessment:  Lazaro Lund is a 22 year old young man with a history of T cell lymphoblastic lymphoma (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  He was treated " per [facility] protocol [id number]. He had a CRu following Induction with a CR at the end of Consolidation. His course was complicated by extensive bilateral DVT and severe necrotizing pancreatitis requiring cessation of PEG-asparaginase from his treatment.  He completed therapy on 12/23/21 and is being seen today for an off therapy visit.  He is doing well. Blood counts look excellent.  Intermittent mildly elevated glucose however A1C was normal.  He is getting caught up on vaccines.      Plan:   1) Labs reviewed with Lazaro, all cell lines look good.  2) Previously reviewed vaccine titers and received vaccination since his last visit with his PCP.  He is fully caught up with the exception of his 2nd and 3rd Hep B and 3rd HPV.  Discussed the importance of these vaccines.  Recommend Lazaro use condoms with sexual activity even once he is fully caught up on vaccines.  3) End of therapy echo done on 1/7/22.  Total anthracycline dose= 175mg/m2, he will need echos every 5 years (2027)  4) Discussed fertility previously. Lazaro did sperm bank but elected not to keep the specimen. He is not interested in semen analysis unless he has difficult with pregnancy in a future partner.  5) Discussed that his history of pancreatitis can increase his risk of diabetes.  His A1C is in the normal range which is great however he would benefit from some lifestyle modifications including increased activity and decreased intake of high sugar foods and refined carbs that have a high glycemic index.    5) Lazaro will RTC in 2 months for follow up visit and labs.  Following that visit will space out to Q3 months.     Félix Van CNP    Total time spent on the following services on the date of the encounter: 31 minutes  Preparing to see patient with chart review    Ordering medications, labs tests  Communicating with other healthcare professionals and care coordination  Interpretation of labs  Performing a medically appropriate examination    Counseling and educating the patient/family/caregiver   Communicating results to the patient/family/caregiver   Documenting clinical information in the electronic health record       Please do not hesitate to contact me if you have any questions/concerns.     Sincerely,       YOHAN Dunn CNP

## 2023-10-13 NOTE — PROGRESS NOTES
Pediatric Hematology/Oncology Clinic Note     Lazaro Lund is a 22 year old young man with a history of T-cell lymphoblastic lymphoma. Lazaro presented with acute onset cough and was found to have an anterior mediastinal mass and malignant left-sided pleural effusion.  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  He was admitted to [facility] oncology service 8/30 and started on treatment per [id number].  He had a pleural effusion that required a chest tube and a pericardial effusion that was not drained. His Induction was complicated by cardiac compression secondary to his mass leading to tamponade, this improved through out his hospital stay.  He also had dysphagia that improved with treatment of his mass, swallow study was normal. His course was complicated by extensive bilateral UE DVTs, and he was successfully treated with anticoagulation. His consolidation course was complicated by the development of severe asparaginase induced necrotizing pancreatitis. He became quite ill with SIRS and associated hypotension, he required pressor support in the ICU. As a result, asparaginase was eliminated from his treatment plan. He was in CR status at end of consolidation. During maintenance, he developed hepatotoxicity so allopurinol and dose reduced 6MP were started for correction of skewed 6-MP metabolism.  Lazaro was treated per [facility] protocol [id number] and completed therapy on 12/23/21, he comes to clinic today for an off therapy visit.  He is seen via a billable video visit.        Video-Visit Details    Type of service:  Video Visit   Video Start Time:  9:02am  Video End Time: 9:15am    Originating Location (pt. Location): Home    Distant Location (provider location):  On-site  Platform used for Video Visit: [facility]      HPI:   Lazaro has been doing well since his last visit.  He has not had any major illnesses, no fevers.  He has some questions about Hep B  and HPV vaccines and if he should abstain from sexual activity until he has been caught up on those vaccinations.      Lazaro's appetite is strong.  He denies GI upset.  No skin concerns.  He denies pain.  He has been very busy at work which has been challenging to balance.     Lazaro is working out regularly at [employer]. Continues to vape.     Review of systems:  The 10 point Review of Systems is negative other than noted in the HPI or here.      PMH:   Past Medical History:   Diagnosis Date    Acute necrotizing pancreatitis 11/07/2019    attributed to asparaginase    Acute pancreatitis due to PEGaspariginase therapy  11/15/2019    DVT of upper extremity (deep vein thrombosis) (H) 09/26/2019    Bilateral     Edema of upper extremity 10/17/2019    Folliculitis 10/17/2019    Migraine 2006    have resolved    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   -- Spinal HA following LP  -- TPMT shows Intermediate activity with normal TPMT/NUDT15 genotype  -- Factor V leiden and prothrombin gene mutation negative  -- Necrotizing pancreatitis secondary to asparaginase  -- Long standing right sided gynecomastia that predated his lymphoma diagnosis.    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  He continues to live with 2 of his friends with whom he purchased a house. He has a dog, a Rottweiler and English Bulldog mix named Eliot.  He recently got his brother a job at his workplace.    Current Medications:  No medications at this time.     Physical Exam:      Wt Readings from Last 4 Encounters:   10/13/23 86.2 kg (190 lb)   08/08/23 90.5 kg (199 lb 8.3 oz)   06/06/23 92.9 kg (204 lb 12.9 oz)   05/31/23 93 kg (205 lb)     Ht Readings from Last 2 Encounters:  "  10/13/23 1.829 m (6')   08/08/23 1.843 m (6' 0.56\")     Physical Exam   GENERAL: Healthy, alert and no distress  EYES: Eyes grossly normal to inspection.  No discharge or erythema, or obvious scleral/conjunctival abnormalities.  RESP: No audible wheeze, cough, or visible cyanosis.  No visible retractions or increased work of breathing.    SKIN: Visible skin clear. No significant rash, abnormal pigmentation or lesions.  NEURO: Cranial nerves grossly intact.  Mentation and speech appropriate for age.  PSYCH: Mentation appears normal, affect normal/bright, judgement and insight intact, normal speech and appearance well-groomed.     Labs:    Latest Reference Range & Units 10/10/23 15:25   WBC 4.0 - 11.0 10e3/uL 5.6   Hemoglobin 13.3 - 17.7 g/dL 15.9   Hematocrit 40.0 - 53.0 % 45.7   Platelet Count 150 - 450 10e3/uL 314   RBC Count 4.40 - 5.90 10e6/uL 5.25   MCV 78 - 100 fL 87   MCH 26.5 - 33.0 pg 30.3   MCHC 31.5 - 36.5 g/dL 34.8   RDW 10.0 - 15.0 % 12.0   % Neutrophils % 67   % Lymphocytes % 23   % Monocytes % 6   % Eosinophils % 3   % Basophils % 1   Absolute Basophils 0.0 - 0.2 10e3/uL 0.0   Absolute Eosinophils 0.0 - 0.7 10e3/uL 0.2   Absolute Immature Granulocytes <=0.4 10e3/uL 0.0   Absolute Lymphocytes 0.8 - 5.3 10e3/uL 1.3   Absolute Monocytes 0.0 - 1.3 10e3/uL 0.4   % Immature Granulocytes % 0   Absolute Neutrophils 1.6 - 8.3 10e3/uL 3.8   Absolute NRBCs 10e3/uL 0.0   NRBCs per 100 WBC <1 /100 0       Assessment:  Lazaro Lund is a 22 year old young man with a history of T cell lymphoblastic lymphoma (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  He was treated per [facility] protocol [id number]. He had a CRu following Induction with a CR at the end of Consolidation. His course was complicated by extensive bilateral DVT and severe necrotizing pancreatitis requiring cessation of PEG-asparaginase from his treatment.  He completed therapy on 12/23/21 and is being seen today for an off therapy visit.  He is doing well. Blood counts look " excellent.  Intermittent mildly elevated glucose however A1C was normal.  He is getting caught up on vaccines.      Plan:   1) Labs reviewed with Lazaro, all cell lines look good.  2) Previously reviewed vaccine titers and received vaccination since his last visit with his PCP.  He is fully caught up with the exception of his 2nd and 3rd Hep B and 3rd HPV.  Discussed the importance of these vaccines.  Recommend Lazaro use condoms with sexual activity even once he is fully caught up on vaccines.  3) End of therapy echo done on 1/7/22.  Total anthracycline dose= 175mg/m2, he will need echos every 5 years (2027)  4) Discussed fertility previously. Lazaro did sperm bank but elected not to keep the specimen. He is not interested in semen analysis unless he has difficult with pregnancy in a future partner.  5) Discussed that his history of pancreatitis can increase his risk of diabetes.  His A1C is in the normal range which is great however he would benefit from some lifestyle modifications including increased activity and decreased intake of high sugar foods and refined carbs that have a high glycemic index.    5) Lazaro will RTC in 2 months for follow up visit and labs.  Following that visit will space out to Q3 months.     Félix Van CNP    Total time spent on the following services on the date of the encounter: 31 minutes  Preparing to see patient with chart review    Ordering medications, labs tests  Communicating with other healthcare professionals and care coordination  Interpretation of labs  Performing a medically appropriate examination   Counseling and educating the patient/family/caregiver   Communicating results to the patient/family/caregiver   Documenting clinical information in the electronic health record

## 2023-10-19 ENCOUNTER — TELEPHONE (OUTPATIENT)
Dept: PEDIATRIC HEMATOLOGY/ONCOLOGY | Facility: CLINIC | Age: 23
End: 2023-10-19
Payer: COMMERCIAL

## 2023-10-19 NOTE — TELEPHONE ENCOUNTER
Lazaro called this [facility] to connect with ANGELY Craft but as she is out of town I offered to help answer questions that he had.     He stated that he was seeking guidance on navigating safe sex with a partner who has genital herpes. He reports being told that if the partner does not have active sores it can not spread, but he wanted clarification.    I reviewed with Marely Ugalde NP and reviewed CDC/NIH guidance to report back to Lazaro. HSV can still spread without a visible infection. Guidance given to avoid sexual activity while there is a visible infection, and to use condoms as protection every time when there is not a visible infection. Lazaro stated understanding.   He also then asked about cold sores, and I reviewed that these are two different HSV types, but that cold sores are still infectious so should also work to decrease partner exposure during this time.   I gave the resource that if he felt treatment was needed or wanted further testing he should reach out to his PCP, or there are many local clinics that can help. He stated understanding of this, and had no further questions at this time. I reminded him to reach out with any other needs.     Marcella Toribio, BSN, RN   Pediatric Solid Tumor Care Coordinator  Pediatric Vascular Anomaly Care Coordinator

## 2024-01-09 ENCOUNTER — ONCOLOGY VISIT (OUTPATIENT)
Dept: PEDIATRIC HEMATOLOGY/ONCOLOGY | Facility: CLINIC | Age: 24
End: 2024-01-09
Attending: NURSE PRACTITIONER
Payer: COMMERCIAL

## 2024-01-09 VITALS
HEART RATE: 76 BPM | HEIGHT: 73 IN | DIASTOLIC BLOOD PRESSURE: 80 MMHG | SYSTOLIC BLOOD PRESSURE: 124 MMHG | OXYGEN SATURATION: 98 % | WEIGHT: 205.25 LBS | BODY MASS INDEX: 27.2 KG/M2 | TEMPERATURE: 98.7 F | RESPIRATION RATE: 16 BRPM

## 2024-01-09 DIAGNOSIS — C83.50 T LYMPHOBLASTIC LYMPHOMA (H): ICD-10-CM

## 2024-01-09 LAB
BASOPHILS # BLD AUTO: 0 10E3/UL (ref 0–0.2)
BASOPHILS NFR BLD AUTO: 1 %
EOSINOPHIL # BLD AUTO: 0.4 10E3/UL (ref 0–0.7)
EOSINOPHIL NFR BLD AUTO: 6 %
ERYTHROCYTE [DISTWIDTH] IN BLOOD BY AUTOMATED COUNT: 11.9 % (ref 10–15)
HCT VFR BLD AUTO: 43.3 % (ref 40–53)
HGB BLD-MCNC: 14.9 G/DL (ref 13.3–17.7)
IMM GRANULOCYTES # BLD: 0 10E3/UL
IMM GRANULOCYTES NFR BLD: 0 %
LYMPHOCYTES # BLD AUTO: 1.6 10E3/UL (ref 0.8–5.3)
LYMPHOCYTES NFR BLD AUTO: 24 %
MCH RBC QN AUTO: 30.1 PG (ref 26.5–33)
MCHC RBC AUTO-ENTMCNC: 34.4 G/DL (ref 31.5–36.5)
MCV RBC AUTO: 88 FL (ref 78–100)
MONOCYTES # BLD AUTO: 0.4 10E3/UL (ref 0–1.3)
MONOCYTES NFR BLD AUTO: 7 %
NEUTROPHILS # BLD AUTO: 4.2 10E3/UL (ref 1.6–8.3)
NEUTROPHILS NFR BLD AUTO: 62 %
NRBC # BLD AUTO: 0 10E3/UL
NRBC BLD AUTO-RTO: 0 /100
PLATELET # BLD AUTO: 312 10E3/UL (ref 150–450)
RBC # BLD AUTO: 4.95 10E6/UL (ref 4.4–5.9)
WBC # BLD AUTO: 6.7 10E3/UL (ref 4–11)

## 2024-01-09 PROCEDURE — 36415 COLL VENOUS BLD VENIPUNCTURE: CPT | Performed by: NURSE PRACTITIONER

## 2024-01-09 PROCEDURE — 99214 OFFICE O/P EST MOD 30 MIN: CPT | Performed by: NURSE PRACTITIONER

## 2024-01-09 PROCEDURE — 85025 COMPLETE CBC W/AUTO DIFF WBC: CPT | Performed by: NURSE PRACTITIONER

## 2024-01-09 ASSESSMENT — PAIN SCALES - GENERAL: PAINLEVEL: NO PAIN (0)

## 2024-01-09 NOTE — LETTER
1/9/2024      RE: Lazaro Lund  [address] [facility] [id number]     Dear Colleague,    Thank you for the opportunity to participate in the care of your patient, Lazaro Lund, at the [facility] at [facility]. Please see a copy of my visit note below.    Pediatric Hematology/Oncology Clinic Note     Lazaro Lund is a 22 year old young man with a history of T-cell lymphoblastic lymphoma. Lazaro presented with acute onset cough and was found to have an anterior mediastinal mass and malignant left-sided pleural effusion.  A CT guided biopsy was obtained at [facility] and pathology was consistent with T-cell leukemia vs lymphoma.  He was admitted to [facility] oncology service 8/30 and started on treatment per [id number].  He had a pleural effusion that required a chest tube and a pericardial effusion that was not drained. His Induction was complicated by cardiac compression secondary to his mass leading to tamponade, this improved through out his hospital stay.  He also had dysphagia that improved with treatment of his mass, swallow study was normal. His course was complicated by extensive bilateral UE DVTs, and he was successfully treated with anticoagulation. His consolidation course was complicated by the development of severe asparaginase induced necrotizing pancreatitis. He became quite ill with SIRS and associated hypotension, he required pressor support in the ICU. As a result, asparaginase was eliminated from his treatment plan. He was in CR status at end of consolidation. During maintenance, he developed hepatotoxicity so allopurinol and dose reduced 6MP were started for correction of skewed 6-MP metabolism.  Lazaro was treated per [facility] protocol [id number] and completed therapy on 12/23/21, he comes to clinic today for an off therapy visit.     HPI:   Lazaro is accompanied by his girlfriend Joel.  They met online  and have been dating for about 4 months.  Lazaro has been doing well since his last visit here.  This is his slow season for work so he has been taking it easy.  His health has been good, he has not had any interim illness.    Lazaro is eating well, no GI upset.  He isn't thrilled with his weight and hopes to start working out more again in the future.  He denies pain, no skin concerns.  He hasn't been back to primary care to get vaccines.  He has quit vaping.      Lazaro's mood has been good.  He is sleeping well at night.  Energy level is great.  No paresthesias.     Review of systems:  The 10 point Review of Systems is negative other than noted in the HPI or here.      PMH:   Past Medical History:   Diagnosis Date    Acute necrotizing pancreatitis 11/07/2019    attributed to asparaginase    Acute pancreatitis due to PEGaspariginase therapy  11/15/2019    DVT of upper extremity (deep vein thrombosis) (H) 09/26/2019    Bilateral     Edema of upper extremity 10/17/2019    Folliculitis 10/17/2019    Migraine 2006    have resolved    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   -- Spinal HA following LP  -- TPMT shows Intermediate activity with normal TPMT/NUDT15 genotype  -- Factor V leiden and prothrombin gene mutation negative  -- Necrotizing pancreatitis secondary to asparaginase  -- Long standing right sided gynecomastia that predated his lymphoma diagnosis.    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  He continues to live with 2 of his friends with whom he purchased a house. He has a dog, a Rottweiler and English Bulldog mix named Eliot.      Current Medications:  No medications at this time.     Physical Exam:   Temp:  [98.7  F (37.1  C)] 98.7  F  "(37.1  C)  Pulse:  [76] 76  Resp:  [16] 16  BP: (124)/(80) 124/80  SpO2:  [98 %] 98 %  Wt Readings from Last 4 Encounters:   01/09/24 93.1 kg (205 lb 4 oz)   10/13/23 86.2 kg (190 lb)   08/08/23 90.5 kg (199 lb 8.3 oz)   06/06/23 92.9 kg (204 lb 12.9 oz)     Ht Readings from Last 2 Encounters:   01/09/24 1.845 m (6' 0.64\")   10/13/23 1.829 m (6')       General: Lazaro is alert, interactive and appropriate, well-appearing, in no distress  HEENT: Skull is atrauamatic and normocephalic.  Full head of hair. PERRLA, sclera are non icteric and not injected, EOM are intact. Nares are patent without rhinorrhea.  Posterior oropharynx clear. No erythema or white plaques. No mucositis. MMM.  Neck:  Supple without lymphadenopathy.   Lymph: There is no cervical, supraclavicular, axillary, lymphadenopathy palpated.  Cardiovascular/chest:   Mild right sided gynecomastia. HR is regular, S1, S2 no murmur.  Capillary refill is < 2 seconds.   Respiratory: No cough noted. Respirations are easy. Lungs are clear to auscultation throughout.  No crackles or wheezes.  Gastrointestinal: BS present in all quadrants.  Abdomen is soft and flat.  No pain with palpation. No hepatosplenomegaly or masses are palpated.  Skin: No rash. Old port site is c/d/i, scar is wide.  Neurological:  CN 2-12 grossly intact. Gait is normal.  No issues with balance. Sensation intact in hands and feet.     Musculoskeletal: Good strength and ROM in all extremities.  5/5 strength with dorsiflexion and plantarflexion at ankles, and with  strength.     Labs:   Results for orders placed or performed in visit on 01/09/24   CBC with platelets and differential     Status: None   Result Value Ref Range    WBC Count 6.7 4.0 - 11.0 10e3/uL    RBC Count 4.95 4.40 - 5.90 10e6/uL    Hemoglobin 14.9 13.3 - 17.7 g/dL    Hematocrit 43.3 40.0 - 53.0 %    MCV 88 78 - 100 fL    MCH 30.1 26.5 - 33.0 pg    MCHC 34.4 31.5 - 36.5 g/dL    RDW 11.9 10.0 - 15.0 %    Platelet Count 312 " 150 - 450 10e3/uL    % Neutrophils 62 %    % Lymphocytes 24 %    % Monocytes 7 %    % Eosinophils 6 %    % Basophils 1 %    % Immature Granulocytes 0 %    NRBCs per 100 WBC 0 <1 /100    Absolute Neutrophils 4.2 1.6 - 8.3 10e3/uL    Absolute Lymphocytes 1.6 0.8 - 5.3 10e3/uL    Absolute Monocytes 0.4 0.0 - 1.3 10e3/uL    Absolute Eosinophils 0.4 0.0 - 0.7 10e3/uL    Absolute Basophils 0.0 0.0 - 0.2 10e3/uL    Absolute Immature Granulocytes 0.0 <=0.4 10e3/uL    Absolute NRBCs 0.0 10e3/uL   CBC with Platelets & Differential     Status: None    Narrative    The following orders were created for panel order CBC with Platelets & Differential.  Procedure                               Abnormality         Status                     ---------                               -----------         ------                     CBC with platelets and d...[[id number]]                      Final result                 Please view results for these tests on the individual orders.         Assessment:  Lazaro Lund is a 22 year old young man with a history of T cell lymphoblastic lymphoma (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  He was treated per [facility] protocol [id number]. He had a CRu following Induction with a CR at the end of Consolidation. His course was complicated by extensive bilateral DVT and severe necrotizing pancreatitis requiring cessation of PEG-asparaginase from his treatment.  He completed therapy on 12/23/21 and is being seen today for an off therapy visit.  He is doing well. Blood counts look excellent.  Intermittent mildly elevated glucose however A1C was normal.  He is getting caught up on vaccines.      Plan:   1) Labs reviewed with Lazaro, all cell lines look good.  2) Previously reviewed vaccine titers and received vaccination since his last visit with his PCP.  He is fully caught up with the exception of his 2nd and 3rd Hep B and 3rd HPV.  Recommended he get next set today however he didn't want to wait.  He plans to ask for them to  be ordered at the start of his next visit so that he doesn't have to wait.  3) End of therapy echo done on 1/7/22.  Total anthracycline dose= 175mg/m2, he will need echos every 5 years (2027)  4) Discussed fertility previously. Lazaro did sperm bank but elected not to keep the specimen. He is not interested in semen analysis unless he has difficult with pregnancy in a future partner.  5) Discussed that his history of pancreatitis can increase his risk of diabetes.  His A1C is in the normal range which is great however he would benefit from some lifestyle modifications including increased activity and decreased intake of high sugar foods and refined carbs that have a high glycemic index.    5) Lazaro will RTC in 3 months for follow up visit and labs.  Plan to give vaccines at that visit.    Félix Van CNP    Total time spent on the following services on the date of the encounter: 36 minutes  Preparing to see patient with chart review    Ordering medications, labs tests  Communicating with other healthcare professionals and care coordination  Interpretation of labs  Performing a medically appropriate examination   Counseling and educating the patient/family/caregiver   Communicating results to the patient/family/caregiver   Documenting clinical information in the electronic health record       Please do not hesitate to contact me if you have any questions/concerns.     Sincerely,       YOHAN Dunn CNP

## 2024-01-09 NOTE — PROGRESS NOTES
Pediatric Hematology/Oncology Clinic Note     Lazaro Lund is a 22 year old young man with a history of T-cell lymphoblastic lymphoma. Lazaro presented with acute onset cough and was found to have an anterior mediastinal mass and malignant left-sided pleural effusion.  A CT guided biopsy was obtained at [facility] and pathology was consistent with T-cell leukemia vs lymphoma.  He was admitted to [facility] oncology service 8/30 and started on treatment per [id number].  He had a pleural effusion that required a chest tube and a pericardial effusion that was not drained. His Induction was complicated by cardiac compression secondary to his mass leading to tamponade, this improved through out his hospital stay.  He also had dysphagia that improved with treatment of his mass, swallow study was normal. His course was complicated by extensive bilateral UE DVTs, and he was successfully treated with anticoagulation. His consolidation course was complicated by the development of severe asparaginase induced necrotizing pancreatitis. He became quite ill with SIRS and associated hypotension, he required pressor support in the ICU. As a result, asparaginase was eliminated from his treatment plan. He was in CR status at end of consolidation. During maintenance, he developed hepatotoxicity so allopurinol and dose reduced 6MP were started for correction of skewed 6-MP metabolism.  Lazaro was treated per [facility] protocol [id number] and completed therapy on 12/23/21, he comes to clinic today for an off therapy visit.     HPI:   Lazaro is accompanied by his girlfriend Joel.  They met online and have been dating for about 4 months.  Lazaro has been doing well since his last visit here.  This is his slow season for work so he has been taking it easy.  His health has been good, he has not had any interim illness.    Lazaro is eating well, no GI upset.  He isn't thrilled with his weight and hopes to start working out more again in the future.   "He denies pain, no skin concerns.  He hasn't been back to primary care to get vaccines.  He has quit vaping.      Lazaro's mood has been good.  He is sleeping well at night.  Energy level is great.  No paresthesias.     Review of systems:  The 10 point Review of Systems is negative other than noted in the HPI or here.      PMH:   Past Medical History:   Diagnosis Date    Acute necrotizing pancreatitis 11/07/2019    attributed to asparaginase    Acute pancreatitis due to PEGaspariginase therapy  11/15/2019    DVT of upper extremity (deep vein thrombosis) (H) 09/26/2019    Bilateral     Edema of upper extremity 10/17/2019    Folliculitis 10/17/2019    Migraine 2006    have resolved    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   -- Spinal HA following LP  -- TPMT shows Intermediate activity with normal TPMT/NUDT15 genotype  -- Factor V leiden and prothrombin gene mutation negative  -- Necrotizing pancreatitis secondary to asparaginase  -- Long standing right sided gynecomastia that predated his lymphoma diagnosis.    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  He continues to live with 2 of his friends with whom he purchased a house. He has a dog, a Rottweiler and English Bulldog mix named Eliot.      Current Medications:  No medications at this time.     Physical Exam:   Temp:  [98.7  F (37.1  C)] 98.7  F (37.1  C)  Pulse:  [76] 76  Resp:  [16] 16  BP: (124)/(80) 124/80  SpO2:  [98 %] 98 %  Wt Readings from Last 4 Encounters:   01/09/24 93.1 kg (205 lb 4 oz)   10/13/23 86.2 kg (190 lb)   08/08/23 90.5 kg (199 lb 8.3 oz)   06/06/23 92.9 kg (204 lb 12.9 oz)     Ht Readings from Last 2 Encounters:   01/09/24 1.845 m (6' 0.64\")   10/13/23 1.829 m (6') "       General: Lazaro is alert, interactive and appropriate, well-appearing, in no distress  HEENT: Skull is atrauamatic and normocephalic.  Full head of hair. PERRLA, sclera are non icteric and not injected, EOM are intact. Nares are patent without rhinorrhea.  Posterior oropharynx clear. No erythema or white plaques. No mucositis. MMM.  Neck:  Supple without lymphadenopathy.   Lymph: There is no cervical, supraclavicular, axillary, lymphadenopathy palpated.  Cardiovascular/chest:   Mild right sided gynecomastia. HR is regular, S1, S2 no murmur.  Capillary refill is < 2 seconds.   Respiratory: No cough noted. Respirations are easy. Lungs are clear to auscultation throughout.  No crackles or wheezes.  Gastrointestinal: BS present in all quadrants.  Abdomen is soft and flat.  No pain with palpation. No hepatosplenomegaly or masses are palpated.  Skin: No rash. Old port site is c/d/i, scar is wide.  Neurological:  CN 2-12 grossly intact. Gait is normal.  No issues with balance. Sensation intact in hands and feet.     Musculoskeletal: Good strength and ROM in all extremities.  5/5 strength with dorsiflexion and plantarflexion at ankles, and with  strength.     Labs:   Results for orders placed or performed in visit on 01/09/24   CBC with platelets and differential     Status: None   Result Value Ref Range    WBC Count 6.7 4.0 - 11.0 10e3/uL    RBC Count 4.95 4.40 - 5.90 10e6/uL    Hemoglobin 14.9 13.3 - 17.7 g/dL    Hematocrit 43.3 40.0 - 53.0 %    MCV 88 78 - 100 fL    MCH 30.1 26.5 - 33.0 pg    MCHC 34.4 31.5 - 36.5 g/dL    RDW 11.9 10.0 - 15.0 %    Platelet Count 312 150 - 450 10e3/uL    % Neutrophils 62 %    % Lymphocytes 24 %    % Monocytes 7 %    % Eosinophils 6 %    % Basophils 1 %    % Immature Granulocytes 0 %    NRBCs per 100 WBC 0 <1 /100    Absolute Neutrophils 4.2 1.6 - 8.3 10e3/uL    Absolute Lymphocytes 1.6 0.8 - 5.3 10e3/uL    Absolute Monocytes 0.4 0.0 - 1.3 10e3/uL    Absolute Eosinophils 0.4 0.0  - 0.7 10e3/uL    Absolute Basophils 0.0 0.0 - 0.2 10e3/uL    Absolute Immature Granulocytes 0.0 <=0.4 10e3/uL    Absolute NRBCs 0.0 10e3/uL   CBC with Platelets & Differential     Status: None    Narrative    The following orders were created for panel order CBC with Platelets & Differential.  Procedure                               Abnormality         Status                     ---------                               -----------         ------                     CBC with platelets and d...[[id number]]                      Final result                 Please view results for these tests on the individual orders.         Assessment:  Lazaro Lund is a 22 year old young man with a history of T cell lymphoblastic lymphoma (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  He was treated per [facility] protocol [id number]. He had a CRu following Induction with a CR at the end of Consolidation. His course was complicated by extensive bilateral DVT and severe necrotizing pancreatitis requiring cessation of PEG-asparaginase from his treatment.  He completed therapy on 12/23/21 and is being seen today for an off therapy visit.  He is doing well. Blood counts look excellent.  Intermittent mildly elevated glucose however A1C was normal.  He is getting caught up on vaccines.      Plan:   1) Labs reviewed with Lazaro, all cell lines look good.  2) Previously reviewed vaccine titers and received vaccination since his last visit with his PCP.  He is fully caught up with the exception of his 2nd and 3rd Hep B and 3rd HPV.  Recommended he get next set today however he didn't want to wait.  He plans to ask for them to be ordered at the start of his next visit so that he doesn't have to wait.  3) End of therapy echo done on 1/7/22.  Total anthracycline dose= 175mg/m2, he will need echos every 5 years (2027)  4) Discussed fertility previously. Lazaro did sperm bank but elected not to keep the specimen. He is not interested in semen analysis unless he has difficult  with pregnancy in a future partner.  5) Discussed that his history of pancreatitis can increase his risk of diabetes.  His A1C is in the normal range which is great however he would benefit from some lifestyle modifications including increased activity and decreased intake of high sugar foods and refined carbs that have a high glycemic index.    5) Lazaro will RTC in 3 months for follow up visit and labs.  Plan to give vaccines at that visit.    Félix Van CNP    Total time spent on the following services on the date of the encounter: 36 minutes  Preparing to see patient with chart review    Ordering medications, labs tests  Communicating with other healthcare professionals and care coordination  Interpretation of labs  Performing a medically appropriate examination   Counseling and educating the patient/family/caregiver   Communicating results to the patient/family/caregiver   Documenting clinical information in the electronic health record

## 2024-01-09 NOTE — NURSING NOTE
"Chief Complaint   Patient presents with    RECHECK     Patient is here for an exam and labs.      /80 (BP Location: Right arm, Patient Position: Sitting, Cuff Size: Adult Large)   Pulse 76   Temp 98.7  F (37.1  C) (Oral)   Resp 16   Ht 1.845 m (6' 0.64\")   Wt 93.1 kg (205 lb 4 oz)   SpO2 98%   BMI 27.35 kg/m      No Pain (0)  Data Unavailable    I have reviewed the patients medications and allergies    Height/weight double check needed? No    Peds Outpatient BP  1) Rested for 5 minutes, BP taken on bare arm, patient sitting (or supine for infants) w/ legs uncrossed?   Yes  2) Right arm used?  Right arm   Yes  3) Arm circumference of largest part of upper arm (in cm):   4) BP cuff sized used: Large Adult (32-43cm)   If used different size cuff then what was recommended why? N/A  5) First BP reading:machine   BP Readings from Last 1 Encounters:   01/09/24 124/80      Is reading >90%?No   (90% for <1 years is 90/50)  (90% for >18 years is 140/90)  *If a machine BP is at or above 90% take manual BP  6) Manual BP reading: N/A  7) Other comments: None          Xenia Pradhan CMA  January 9, 2024    "

## 2024-01-29 ENCOUNTER — OFFICE VISIT (OUTPATIENT)
Dept: FAMILY MEDICINE | Facility: OTHER | Age: 24
End: 2024-01-29
Payer: COMMERCIAL

## 2024-01-29 VITALS
OXYGEN SATURATION: 97 % | BODY MASS INDEX: 26.9 KG/M2 | HEIGHT: 73 IN | SYSTOLIC BLOOD PRESSURE: 112 MMHG | DIASTOLIC BLOOD PRESSURE: 82 MMHG | WEIGHT: 203 LBS | HEART RATE: 75 BPM | RESPIRATION RATE: 18 BRPM | TEMPERATURE: 97.5 F

## 2024-01-29 DIAGNOSIS — N34.2 URETHRITIS: ICD-10-CM

## 2024-01-29 DIAGNOSIS — N20.0 KIDNEY STONE: ICD-10-CM

## 2024-01-29 DIAGNOSIS — A74.9 CHLAMYDIA INFECTION: Primary | ICD-10-CM

## 2024-01-29 LAB
ALBUMIN UR-MCNC: NEGATIVE MG/DL
AMORPH CRY #/AREA URNS HPF: ABNORMAL /HPF
APPEARANCE UR: CLEAR
BACTERIA #/AREA URNS HPF: ABNORMAL /HPF
BILIRUB UR QL STRIP: NEGATIVE
COLOR UR AUTO: YELLOW
GLUCOSE UR STRIP-MCNC: NEGATIVE MG/DL
HGB UR QL STRIP: NEGATIVE
KETONES UR STRIP-MCNC: NEGATIVE MG/DL
LEUKOCYTE ESTERASE UR QL STRIP: NEGATIVE
NITRATE UR QL: NEGATIVE
PH UR STRIP: 8.5 [PH] (ref 5–7)
RBC #/AREA URNS AUTO: ABNORMAL /HPF
SP GR UR STRIP: 1.02 (ref 1–1.03)
UROBILINOGEN UR STRIP-ACNC: 2 E.U./DL
WBC #/AREA URNS AUTO: ABNORMAL /HPF

## 2024-01-29 PROCEDURE — 87491 CHLMYD TRACH DNA AMP PROBE: CPT | Performed by: FAMILY MEDICINE

## 2024-01-29 PROCEDURE — 90471 IMMUNIZATION ADMIN: CPT | Performed by: FAMILY MEDICINE

## 2024-01-29 PROCEDURE — 90746 HEPB VACCINE 3 DOSE ADULT IM: CPT | Performed by: FAMILY MEDICINE

## 2024-01-29 PROCEDURE — 87591 N.GONORRHOEAE DNA AMP PROB: CPT | Performed by: FAMILY MEDICINE

## 2024-01-29 PROCEDURE — 81001 URINALYSIS AUTO W/SCOPE: CPT | Performed by: FAMILY MEDICINE

## 2024-01-29 PROCEDURE — 99213 OFFICE O/P EST LOW 20 MIN: CPT | Mod: 25 | Performed by: FAMILY MEDICINE

## 2024-01-29 PROCEDURE — 90651 9VHPV VACCINE 2/3 DOSE IM: CPT | Performed by: FAMILY MEDICINE

## 2024-01-29 PROCEDURE — 90472 IMMUNIZATION ADMIN EACH ADD: CPT | Performed by: FAMILY MEDICINE

## 2024-01-29 ASSESSMENT — PAIN SCALES - GENERAL: PAINLEVEL: NO PAIN (0)

## 2024-01-29 NOTE — PROGRESS NOTES
"  Assessment & Plan         ICD-10-CM    1. Urethritis  N34.2 NEISSERIA GONORRHOEA PCR     CHLAMYDIA TRACHOMATIS PCR     UA with Microscopic - lab collect     NEISSERIA GONORRHOEA PCR     CHLAMYDIA TRACHOMATIS PCR     UA with Microscopic - lab collect     Urine Microscopic Exam      2. Kidney stone  N20.0           Patient has had a new sexual partner within the last few months and so this potentially could be an STD and the gonorrhea chlamydia is still pending.  Though the urine sample results had amorphis crystals suggestive of stones and the description of the discomfort and darker colored urine at that time may be suspicious that he was having kidney stones.  There is no blood in the urine at this time and the pain is not present currently and so likely this is going to be difficult to distinguish until the samples have been completed.  We did give him handout on kidney stones and asked him to strain his urine if he is continue to have discomforts through a coffee filter to see if we can catch an actual stone so that we can identify it.    I spent a total of 25 minutes on the day of the visit.   Time spent by me doing chart review, history and exam, documentation and further activities per the note    Floresita Rouse MD         BMI  Estimated body mass index is 27.14 kg/m  as calculated from the following:    Height as of this encounter: 1.842 m (6' 0.52\").    Weight as of this encounter: 92.1 kg (203 lb).   Weight management plan: Discussed healthy diet and exercise guidelines        Lissette Willis is a 23 year old, presenting for the following health issues:  STD        1/29/2024     1:19 PM   Additional Questions   Roomed by simon   Accompanied by alone         1/29/2024     1:19 PM   Patient Reported Additional Medications   Patient reports taking the following new medications none     STD    History of Present Illness       Reason for visit:  Irritation in eurethra  Symptom onset:  1-2 weeks ago  Symptom " "intensity:  Mild  Symptom progression:  Staying the same  Had these symptoms before:  No  What makes it worse:  Peeing    He eats 0-1 servings of fruits and vegetables daily.He consumes 2 sweetened beverage(s) daily.He exercises with enough effort to increase his heart rate 10 to 19 minutes per day.  He exercises with enough effort to increase his heart rate 3 or less days per week.   He is taking medications regularly.                 Review of Systems  Constitutional, HEENT, cardiovascular, pulmonary, GI, , musculoskeletal, neuro, skin, endocrine and psych systems are negative, except as otherwise noted.      Objective    /82   Pulse 75   Temp 97.5  F (36.4  C) (Temporal)   Resp 18   Ht 1.842 m (6' 0.52\")   Wt 92.1 kg (203 lb)   SpO2 97%   BMI 27.14 kg/m    Body mass index is 27.14 kg/m .  Physical Exam   GENERAL: alert and no distress  RESP: lungs clear to auscultation - no rales, rhonchi or wheezes  CV: regular rate and rhythm, normal S1 S2, no S3 or S4, no murmur, click or rub, no peripheral edema  ABDOMEN: soft, nontender, no hepatosplenomegaly, no masses and bowel sounds normal   (male): normal male genitalia without lesions or urethral discharge, no hernia  MS: no gross musculoskeletal defects noted, no edema  SKIN: no suspicious lesions or rashes  NEURO: Normal strength and tone, mentation intact and speech normal  PSYCH: mentation appears normal, affect normal/bright  LYMPH: no cervical, supraclavicular, axillary, or inguinal adenopathy            Signed Electronically by: Floresita Rouse MD, MD    "

## 2024-01-30 LAB
C TRACH DNA SPEC QL NAA+PROBE: POSITIVE
N GONORRHOEA DNA SPEC QL NAA+PROBE: NEGATIVE

## 2024-01-30 RX ORDER — DOXYCYCLINE 100 MG/1
100 CAPSULE ORAL 2 TIMES DAILY
Qty: 14 CAPSULE | Refills: 1 | Status: SHIPPED | OUTPATIENT
Start: 2024-01-30 | End: 2024-01-31

## 2024-04-09 ENCOUNTER — ONCOLOGY VISIT (OUTPATIENT)
Dept: PEDIATRIC HEMATOLOGY/ONCOLOGY | Facility: CLINIC | Age: 24
End: 2024-04-09
Attending: NURSE PRACTITIONER
Payer: COMMERCIAL

## 2024-04-09 VITALS
RESPIRATION RATE: 16 BRPM | HEART RATE: 85 BPM | WEIGHT: 209.44 LBS | HEIGHT: 73 IN | BODY MASS INDEX: 27.76 KG/M2 | OXYGEN SATURATION: 97 % | TEMPERATURE: 98.1 F | DIASTOLIC BLOOD PRESSURE: 70 MMHG | SYSTOLIC BLOOD PRESSURE: 114 MMHG

## 2024-04-09 DIAGNOSIS — C83.50 T LYMPHOBLASTIC LYMPHOMA (H): ICD-10-CM

## 2024-04-09 LAB
BASOPHILS # BLD AUTO: 0 10E3/UL (ref 0–0.2)
BASOPHILS NFR BLD AUTO: 1 %
EOSINOPHIL # BLD AUTO: 0.2 10E3/UL (ref 0–0.7)
EOSINOPHIL NFR BLD AUTO: 4 %
ERYTHROCYTE [DISTWIDTH] IN BLOOD BY AUTOMATED COUNT: 11.8 % (ref 10–15)
HCT VFR BLD AUTO: 43.7 % (ref 40–53)
HGB BLD-MCNC: 15 G/DL (ref 13.3–17.7)
IMM GRANULOCYTES # BLD: 0 10E3/UL
IMM GRANULOCYTES NFR BLD: 0 %
LYMPHOCYTES # BLD AUTO: 1.7 10E3/UL (ref 0.8–5.3)
LYMPHOCYTES NFR BLD AUTO: 30 %
MCH RBC QN AUTO: 29.5 PG (ref 26.5–33)
MCHC RBC AUTO-ENTMCNC: 34.3 G/DL (ref 31.5–36.5)
MCV RBC AUTO: 86 FL (ref 78–100)
MONOCYTES # BLD AUTO: 0.5 10E3/UL (ref 0–1.3)
MONOCYTES NFR BLD AUTO: 8 %
NEUTROPHILS # BLD AUTO: 3.2 10E3/UL (ref 1.6–8.3)
NEUTROPHILS NFR BLD AUTO: 57 %
NRBC # BLD AUTO: 0 10E3/UL
NRBC BLD AUTO-RTO: 0 /100
PLATELET # BLD AUTO: 302 10E3/UL (ref 150–450)
RBC # BLD AUTO: 5.09 10E6/UL (ref 4.4–5.9)
WBC # BLD AUTO: 5.6 10E3/UL (ref 4–11)

## 2024-04-09 PROCEDURE — 36415 COLL VENOUS BLD VENIPUNCTURE: CPT | Performed by: NURSE PRACTITIONER

## 2024-04-09 PROCEDURE — 90746 HEPB VACCINE 3 DOSE ADULT IM: CPT | Performed by: NURSE PRACTITIONER

## 2024-04-09 PROCEDURE — 85025 COMPLETE CBC W/AUTO DIFF WBC: CPT | Performed by: NURSE PRACTITIONER

## 2024-04-09 PROCEDURE — 99214 OFFICE O/P EST MOD 30 MIN: CPT | Performed by: NURSE PRACTITIONER

## 2024-04-09 PROCEDURE — G0010 ADMIN HEPATITIS B VACCINE: HCPCS | Performed by: NURSE PRACTITIONER

## 2024-04-09 PROCEDURE — 250N000011 HC RX IP 250 OP 636: Performed by: NURSE PRACTITIONER

## 2024-04-09 PROCEDURE — 99213 OFFICE O/P EST LOW 20 MIN: CPT | Mod: 25 | Performed by: NURSE PRACTITIONER

## 2024-04-09 RX ADMIN — HEPATITIS B VACCINE (RECOMBINANT) 20 MCG: 20 INJECTION, SUSPENSION INTRAMUSCULAR at 16:52

## 2024-04-09 ASSESSMENT — PAIN SCALES - GENERAL: PAINLEVEL: NO PAIN (0)

## 2024-04-09 NOTE — PROGRESS NOTES
Pediatric Hematology/Oncology Clinic Note     Lazaro Lund is a 22 year old young man with a history of T-cell lymphoblastic lymphoma. Lazaro presented with acute onset cough and was found to have an anterior mediastinal mass and malignant left-sided pleural effusion.  A CT guided biopsy was obtained at [facility] and pathology was consistent with T-cell leukemia vs lymphoma.  He was admitted to [facility] oncology service 8/30 and started on treatment per [id number].  He had a pleural effusion that required a chest tube and a pericardial effusion that was not drained. His Induction was complicated by cardiac compression secondary to his mass leading to tamponade, this improved through out his hospital stay.  He also had dysphagia that improved with treatment of his mass, swallow study was normal. His course was complicated by extensive bilateral UE DVTs, and he was successfully treated with anticoagulation. His consolidation course was complicated by the development of severe asparaginase induced necrotizing pancreatitis. He became quite ill with SIRS and associated hypotension, he required pressor support in the ICU. As a result, asparaginase was eliminated from his treatment plan. He was in CR status at end of consolidation. During maintenance, he developed hepatotoxicity so allopurinol and dose reduced 6MP were started for correction of skewed 6-MP metabolism.  Lazaro was treated per [facility] [place] [place] protocol [id number] and completed therapy on 12/23/21, he comes to clinic today for an off therapy visit.     HPI:   Lazaro comes to clinic alone today. Lazaro has been doing really well since his last visit. He has been in good health and has not had any recent illnesses or fevers. Denies pain. He has been eating and drinking well, no N/V or GI upset. No skin concerns or rashes. No bruising, bleeding, or petechiae. No paresthesias. Normal voiding and stooling.     Lazaro did start vaping again. He had quit for awhile but started  again a couple of weeks ago when he took a trip to [place]. He also has 3-4 drinks roughly 1-3 times per week. His mood and energy have been good. He has been sleeping well. 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 He also is still dating his girlfriend and has been dating her for about 6 months. He is excited to go to the [facility] game with her, his brother, and a few friends tonight.     Lazaro got 3/3 HPV vaccine and 2/3 Hep B vaccine on 1/29/2024 with his PCP.    No other questions or concerns for today.    Review of systems:  The 10 point Review of Systems is negative other than noted in the HPI or here.      PMH:   Past Medical History:   Diagnosis Date    Acute necrotizing pancreatitis 11/07/2019    attributed to asparaginase    Acute pancreatitis due to PEGaspariginase therapy  11/15/2019    DVT of upper extremity (deep vein thrombosis) (H) 09/26/2019    Bilateral     Edema of upper extremity 10/17/2019    Folliculitis 10/17/2019    Migraine 2006    have resolved    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   -- Spinal HA following LP  -- TPMT shows Intermediate activity with normal TPMT/NUDT15 genotype  -- Factor V leiden and prothrombin gene mutation negative  -- Necrotizing pancreatitis secondary to asparaginase  -- Long standing right sided gynecomastia that predated his lymphoma diagnosis.    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  He continues to live with 2 of his friends with whom he purchased a house. He has a dog, a Rottweiler and English Bulldog mix named Eliot. He enjoys hanging out with friends and spending time with his girlfriend Joel.    Current Medications:  No medications at this time.    "  Physical Exam:   Temp:  [98.1  F (36.7  C)] 98.1  F (36.7  C)  Pulse:  [85] 85  Resp:  [16] 16  BP: (114)/(70) 114/70  SpO2:  [97 %] 97 %  Wt Readings from Last 4 Encounters:   04/09/24 95 kg (209 lb 7 oz)   01/29/24 92.1 kg (203 lb)   01/09/24 93.1 kg (205 lb 4 oz)   10/13/23 86.2 kg (190 lb)     Ht Readings from Last 2 Encounters:   04/09/24 1.848 m (6' 0.76\")   01/29/24 1.842 m (6' 0.52\")       General: Alert, interactive and appropriate, well-appearing, in no distress  HEENT: Skull is atrauamatic and normocephalic.  Full head of hair. PERRLA, sclera are non icteric and not injected, EOM are intact. Nares are patent without rhinorrhea.  Posterior oropharynx clear. MMM.  Neck:  Supple without lymphadenopathy.   Lymph: There is no cervical, supraclavicular, axillary, lymphadenopathy palpated.  Cardiovascular/chest: HR is regular, S1, S2 no murmur.  Capillary refill is < 2 seconds. Radial pulses 2+.  Respiratory: No cough noted. Respirations are easy. Lungs are clear to auscultation throughout.  No crackles or wheezes.  Gastrointestinal: BS present in all quadrants. Abdomen is soft and flat. No pain with palpation. No hepatosplenomegaly or masses are palpated.  Skin: No rash. Old port site is c/d/i, scar is wide.  Neurological: Gait is normal. No issues with balance. Sensation intact in hands and feet.     Musculoskeletal: Good strength and ROM in all extremities. 5/5 strength with dorsiflexion and plantarflexion at ankles, and with  strength.     Labs:   Results for orders placed or performed in visit on 04/09/24   CBC with platelets and differential     Status: None   Result Value Ref Range    WBC Count 5.6 4.0 - 11.0 10e3/uL    RBC Count 5.09 4.40 - 5.90 10e6/uL    Hemoglobin 15.0 13.3 - 17.7 g/dL    Hematocrit 43.7 40.0 - 53.0 %    MCV 86 78 - 100 fL    MCH 29.5 26.5 - 33.0 pg    MCHC 34.3 31.5 - 36.5 g/dL    RDW 11.8 10.0 - 15.0 %    Platelet Count 302 150 - 450 10e3/uL    % Neutrophils 57 %    % " Lymphocytes 30 %    % Monocytes 8 %    % Eosinophils 4 %    % Basophils 1 %    % Immature Granulocytes 0 %    NRBCs per 100 WBC 0 <1 /100    Absolute Neutrophils 3.2 1.6 - 8.3 10e3/uL    Absolute Lymphocytes 1.7 0.8 - 5.3 10e3/uL    Absolute Monocytes 0.5 0.0 - 1.3 10e3/uL    Absolute Eosinophils 0.2 0.0 - 0.7 10e3/uL    Absolute Basophils 0.0 0.0 - 0.2 10e3/uL    Absolute Immature Granulocytes 0.0 <=0.4 10e3/uL    Absolute NRBCs 0.0 10e3/uL   CBC with Platelets & Differential     Status: None    Narrative    The following orders were created for panel order CBC with Platelets & Differential.  Procedure                               Abnormality         Status                     ---------                               -----------         ------                     CBC with platelets and d...[[id number]]                      Final result                 Please view results for these tests on the individual orders.       Assessment:  Lazaro Lund is a 22 year old young man with a history of T cell lymphoblastic lymphoma (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  He was treated per [facility] protocol [id number]. He had a CRu following Induction with a CR at the end of Consolidation. His course was complicated by extensive bilateral DVT and severe necrotizing pancreatitis requiring cessation of PEG-asparaginase from his treatment.  He completed therapy on 12/23/21 and is being seen today for an off therapy visit.  He is doing well. Blood counts look excellent. He is finishing getting caught up on vaccines.      Plan:   1) Labs reviewed with Lazaro, all cell lines look great!  2) Previously reviewed vaccine titers and received vaccination since his last visit with his PCP. He is fully caught up with the exception of his 3rd Hep B. Will get last Hep B vaccine today and then will be fully caught up on vaccinations.  3) End of therapy echo done on 1/7/22.  Total anthracycline dose= 175mg/m2, he will need echos every 5 years (2027)  4) Discussed  fertility previously. Lazaro did sperm bank but elected not to keep the specimen. He is not interested in semen analysis unless he has difficult with pregnancy in a future partner.  5) Discussed that his history of pancreatitis can increase his risk of diabetes.  His A1C is in the normal range which is great however he would benefit from some lifestyle modifications including increased activity, decreased alcohol intake, and decreased intake of high sugar foods and refined carbs that have a high glycemic index.    6) Encouraged Lazaro to reach out to his PCP about nicotine cessation options if he becomes interested in quitting.  7) RTC in 3 months for follow up visit and labs.    IYokasta, STEFANO, scribed for this patient as an DAW[employer] student.    Félix Van, TATY    Total time spent on the following services on the date of the encounter: 34 minutes  Preparing to see patient with chart review    Ordering medications, labs tests  Communicating with other healthcare professionals and care coordination  Interpretation of labs  Performing a medically appropriate examination   Counseling and educating the patient/family/caregiver   Communicating results to the patient/family/caregiver   Documenting clinical information in the electronic health record

## 2024-04-09 NOTE — LETTER
4/9/2024      RE: Lazaro Lund  [address] [address] [facility] [address]     Dear Colleague,    Thank you for the opportunity to participate in the care of your patient, Lazaro Lund, at the [facility] at [facility]. Please see a copy of my visit note below.    Pediatric Hematology/Oncology Clinic Note     Lazaro Lund is a 22 year old young man with a history of T-cell lymphoblastic lymphoma. Lazaro presented with acute onset cough and was found to have an anterior mediastinal mass and malignant left-sided pleural effusion.  A CT guided biopsy was obtained at [facility] and pathology was consistent with T-cell leukemia vs lymphoma.  He was admitted to [facility] oncology service 8/30 and started on treatment per [id number].  He had a pleural effusion that required a chest tube and a pericardial effusion that was not drained. His Induction was complicated by cardiac compression secondary to his mass leading to tamponade, this improved through out his hospital stay.  He also had dysphagia that improved with treatment of his mass, swallow study was normal. His course was complicated by extensive bilateral UE DVTs, and he was successfully treated with anticoagulation. His consolidation course was complicated by the development of severe asparaginase induced necrotizing pancreatitis. He became quite ill with SIRS and associated hypotension, he required pressor support in the ICU. As a result, asparaginase was eliminated from his treatment plan. He was in CR status at end of consolidation. During maintenance, he developed hepatotoxicity so allopurinol and dose reduced 6MP were started for correction of skewed 6-MP metabolism.  Lazaro was treated per [facility] protocol [id number] and completed therapy on 12/23/21, he comes to clinic today for an off therapy visit.     HPI:   Lazaro comes to clinic alone today. Lazaro has been doing really  well since his last visit. He has been in good health and has not had any recent illnesses or fevers. Denies pain. He has been eating and drinking well, no N/V or GI upset. No skin concerns or rashes. No bruising, bleeding, or petechiae. No paresthesias. Normal voiding and stooling.     Lazaro did start vaping again. He had quit for awhile but started again a couple of weeks ago when he took a trip to [place]. He also has 3-4 drinks roughly 1-3 times per week. His mood and energy have been good. He has been sleeping well. 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 He also is still dating his girlfriend and has been dating her for about 6 months. He is excited to go to the [employer] game with her, his brother, and a few friends tonight.     Lazaro got 3/3 HPV vaccine and 2/3 Hep B vaccine on 1/29/2024 with his PCP.    No other questions or concerns for today.    Review of systems:  The 10 point Review of Systems is negative other than noted in the HPI or here.      PMH:   Past Medical History:   Diagnosis Date     Acute necrotizing pancreatitis 11/07/2019    attributed to asparaginase     Acute pancreatitis due to PEGaspariginase therapy  11/15/2019     DVT of upper extremity (deep vein thrombosis) (H) 09/26/2019    Bilateral      Edema of upper extremity 10/17/2019     Folliculitis 10/17/2019     Migraine 2006    have resolved     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   -- Spinal HA following LP  -- TPMT shows Intermediate activity with normal TPMT/NUDT15 genotype  -- Factor V leiden and prothrombin gene mutation negative  -- Necrotizing pancreatitis secondary to asparaginase  -- Long standing right sided gynecomastia that predated his lymphoma diagnosis.    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  He continues to live with 2 of his friends with whom he purchased a house. He has a dog, a Rottweiler and English Bulldog mix named Eliot. He enjoys hanging out with friends and spending time with his girlfriend Joel.    Current Medications:  No medications at this time.     Physical Exam:   Temp:  [98.1  F (36.7  C)] 98.1  F (36.7  C)  Pulse:  [85] 85  Resp:  [16] 16  BP: (114)/(70) 114/70  SpO2:  [97 %] 97 %  Wt Readings from Last 4 Encounters:   04/09/24 95 kg (209 lb 7 oz)   01/29/24 92.1 kg (203 lb)   01/09/24 93.1 kg (205 lb 4 oz)   10/13/23 86.2 kg (190 lb)     Ht Readings from Last 2 Encounters:   04/09/24 1.848 m (6' 0.76\")   01/29/24 1.842 m (6' 0.52\")       General: Alert, interactive and appropriate, well-appearing, in no distress  HEENT: Skull is atrauamatic and normocephalic.  Full head of hair. PERRLA, sclera are non icteric and not injected, EOM are intact. Nares are patent without rhinorrhea.  Posterior oropharynx clear. MMM.  Neck:  Supple without lymphadenopathy.   Lymph: There is no cervical, supraclavicular, axillary, lymphadenopathy palpated.  Cardiovascular/chest: HR is regular, S1, S2 no murmur.  Capillary refill is < 2 seconds. Radial pulses 2+.  Respiratory: No cough noted. Respirations are easy. Lungs are clear to auscultation throughout.  No crackles or wheezes.  Gastrointestinal: BS present in all quadrants. Abdomen is soft and flat. No pain with palpation. No hepatosplenomegaly or masses are palpated.  Skin: No rash. Old port site is c/d/i, scar is wide.  Neurological: Gait is normal. No issues with balance. Sensation intact in hands and feet.     Musculoskeletal: Good strength and ROM in all extremities. 5/5 strength with dorsiflexion and plantarflexion at ankles, and with  strength.     Labs:   Results for orders placed or performed in visit on 04/09/24   CBC with platelets and differential     " Status: None   Result Value Ref Range    WBC Count 5.6 4.0 - 11.0 10e3/uL    RBC Count 5.09 4.40 - 5.90 10e6/uL    Hemoglobin 15.0 13.3 - 17.7 g/dL    Hematocrit 43.7 40.0 - 53.0 %    MCV 86 78 - 100 fL    MCH 29.5 26.5 - 33.0 pg    MCHC 34.3 31.5 - 36.5 g/dL    RDW 11.8 10.0 - 15.0 %    Platelet Count 302 150 - 450 10e3/uL    % Neutrophils 57 %    % Lymphocytes 30 %    % Monocytes 8 %    % Eosinophils 4 %    % Basophils 1 %    % Immature Granulocytes 0 %    NRBCs per 100 WBC 0 <1 /100    Absolute Neutrophils 3.2 1.6 - 8.3 10e3/uL    Absolute Lymphocytes 1.7 0.8 - 5.3 10e3/uL    Absolute Monocytes 0.5 0.0 - 1.3 10e3/uL    Absolute Eosinophils 0.2 0.0 - 0.7 10e3/uL    Absolute Basophils 0.0 0.0 - 0.2 10e3/uL    Absolute Immature Granulocytes 0.0 <=0.4 10e3/uL    Absolute NRBCs 0.0 10e3/uL   CBC with Platelets & Differential     Status: None    Narrative    The following orders were created for panel order CBC with Platelets & Differential.  Procedure                               Abnormality         Status                     ---------                               -----------         ------                     CBC with platelets and d...[[id number]]                      Final result                 Please view results for these tests on the individual orders.       Assessment:  Lazaro Lund is a 22 year old young man with a history of T cell lymphoblastic lymphoma (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  He was treated per [facility] protocol [id number]. He had a CRu following Induction with a CR at the end of Consolidation. His course was complicated by extensive bilateral DVT and severe necrotizing pancreatitis requiring cessation of PEG-asparaginase from his treatment.  He completed therapy on 12/23/21 and is being seen today for an off therapy visit.  He is doing well. Blood counts look excellent. He is finishing getting caught up on vaccines.      Plan:   1) Labs reviewed with Lazaro, all cell lines look great!  2) Previously  reviewed vaccine titers and received vaccination since his last visit with his PCP. He is fully caught up with the exception of his 3rd Hep B. Will get last Hep B vaccine today and then will be fully caught up on vaccinations.  3) End of therapy echo done on 1/7/22.  Total anthracycline dose= 175mg/m2, he will need echos every 5 years (2027)  4) Discussed fertility previously. Lazaro did sperm bank but elected not to keep the specimen. He is not interested in semen analysis unless he has difficult with pregnancy in a future partner.  5) Discussed that his history of pancreatitis can increase his risk of diabetes.  His A1C is in the normal range which is great however he would benefit from some lifestyle modifications including increased activity, decreased alcohol intake, and decreased intake of high sugar foods and refined carbs that have a high glycemic index.    6) Encouraged Lazaro to reach out to his PCP about nicotine cessation options if he becomes interested in quitting.  7) RTC in 3 months for follow up visit and labs.    IYokasta SNP, scribed for this patient as an [employer] student.    Félix Van CNP    Total time spent on the following services on the date of the encounter: 34 minutes  Preparing to see patient with chart review    Ordering medications, labs tests  Communicating with other healthcare professionals and care coordination  Interpretation of labs  Performing a medically appropriate examination   Counseling and educating the patient/family/caregiver   Communicating results to the patient/family/caregiver   Documenting clinical information in the electronic health record       Please do not hesitate to contact me if you have any questions/concerns.     Sincerely,       YOHAN Dunn CNP

## 2024-04-09 NOTE — NURSING NOTE
"Chief Complaint   Patient presents with    RECHECK     Patient is here for a return visit.      /70 (BP Location: Right arm, Patient Position: Sitting, Cuff Size: Adult Large)   Pulse 85   Temp 98.1  F (36.7  C) (Oral)   Resp 16   Ht 1.848 m (6' 0.76\")   Wt 95 kg (209 lb 7 oz)   SpO2 97%   BMI 27.82 kg/m      No Pain (0)  Data Unavailable    I have reviewed the patients medications and allergies    Height/weight double check needed? No    Peds Outpatient BP  1) Rested for 5 minutes, BP taken on bare arm, patient sitting (or supine for infants) w/ legs uncrossed?   Yes  2) Right arm used?  Right arm   Yes  3) Arm circumference of largest part of upper arm (in cm):   4) BP cuff sized used: Adult (25-32cm)   If used different size cuff then what was recommended why? N/A  5) First BP reading:machine   BP Readings from Last 1 Encounters:   04/09/24 114/70      Is reading >90%?No   (90% for <1 years is 90/50)  (90% for >18 years is 140/90)  *If a machine BP is at or above 90% take manual BP  6) Manual BP reading: N/A  7) Other comments: None          Xenia Pradhan CMA  April 9, 2024    "

## 2024-05-08 ENCOUNTER — TELEPHONE (OUTPATIENT)
Dept: FAMILY MEDICINE | Facility: CLINIC | Age: 24
End: 2024-05-08
Payer: COMMERCIAL

## 2024-05-08 DIAGNOSIS — Z86.19 HISTORY OF CHLAMYDIA: Primary | ICD-10-CM

## 2024-05-08 NOTE — LETTER
May 13, 2024      Lazaro Lund  [address] [address] [facility] [address]                Shahnaz Willis,     We have placed the order for you to complete a chlamydia test and you can schedule a lab only visit at any of our clinics to complete this. Please schedule this on [employer] or by calling [phone].     Thank you,     Your [facility] Team

## 2024-05-08 NOTE — TELEPHONE ENCOUNTER
Reason for Call:  Appointment Request    Patient requesting this type of appt:  Chlamydia test- patient was told to follow up from his January visit with a test, but it is not apparent if he needs to see a provider, or to have a lab only- patient is not exhibiting symptoms at this time    Requested provider: Raoul Wilhelm    Reason patient unable to be scheduled:       When does patient want to be seen/preferred time:  as available    Comments:     Could we send this information to you in [facility] or would you prefer to receive a phone call?:   Patient would prefer a phone call   Okay to leave a detailed message?: Yes at Cell number on file:    Telephone Information:   Mobile [phone]       Call taken on 5/8/2024 at 2:20 PM by Jigna PLUNKETT

## 2024-07-10 ENCOUNTER — TELEPHONE (OUTPATIENT)
Dept: PEDIATRIC HEMATOLOGY/ONCOLOGY | Facility: CLINIC | Age: 24
End: 2024-07-10

## 2024-07-15 ENCOUNTER — TELEPHONE (OUTPATIENT)
Dept: PEDIATRIC HEMATOLOGY/ONCOLOGY | Facility: CLINIC | Age: 24
End: 2024-07-15

## 2024-07-24 ENCOUNTER — TELEPHONE (OUTPATIENT)
Dept: PEDIATRIC HEMATOLOGY/ONCOLOGY | Facility: CLINIC | Age: 24
End: 2024-07-24
Payer: COMMERCIAL

## 2024-07-24 NOTE — TELEPHONE ENCOUNTER
"Reached out to Lazaro to reschedule follow up. Lazaro is in between jobs right now and is set to start his new job soon in which he would have different insurance. Lazaro is asking to wait to schedule until his insurance is sorted out and asked that I reach back out at the end of August. He reports that he is doing well, a little low on energy but he thinks it is just \"life catching up to me.\" We reviewed reasons to call back including progressive fatigue. Lazaro verbalized understanding and will reach out if able to schedule sooner. Plan to reach out at the end of August to schedule follow up. Provider, Luana Van NP updated.   "

## 2024-08-03 ENCOUNTER — HEALTH MAINTENANCE LETTER (OUTPATIENT)
Age: 24
End: 2024-08-03

## 2024-08-22 ENCOUNTER — TELEPHONE (OUTPATIENT)
Dept: PEDIATRIC HEMATOLOGY/ONCOLOGY | Facility: CLINIC | Age: 24
End: 2024-08-22

## 2024-09-20 ENCOUNTER — TELEPHONE (OUTPATIENT)
Dept: PEDIATRIC HEMATOLOGY/ONCOLOGY | Facility: CLINIC | Age: 24
End: 2024-09-20
Payer: COMMERCIAL

## 2024-09-20 NOTE — CONFIDENTIAL NOTE
Attempt to reach Lazaro to schedule follow-up. Left VM asking Lazaro to call clinic to schedule. Clinic contact number provided.

## 2024-10-09 ENCOUNTER — TELEPHONE (OUTPATIENT)
Dept: PEDIATRIC HEMATOLOGY/ONCOLOGY | Facility: CLINIC | Age: 24
End: 2024-10-09

## 2024-10-10 ENCOUNTER — APPOINTMENT (OUTPATIENT)
Dept: LAB | Facility: CLINIC | Age: 24
End: 2024-10-10
Payer: COMMERCIAL

## 2024-10-23 ENCOUNTER — TELEPHONE (OUTPATIENT)
Dept: PEDIATRIC HEMATOLOGY/ONCOLOGY | Facility: CLINIC | Age: 24
End: 2024-10-23
Payer: COMMERCIAL

## 2024-10-23 NOTE — CONFIDENTIAL NOTE
Reached out to Lazaro to schedule clinic follow up. Unable to reach him; left VM asking him to call clinic to schedule. Contact info provided.

## 2024-11-01 ENCOUNTER — TELEPHONE (OUTPATIENT)
Dept: PEDIATRIC HEMATOLOGY/ONCOLOGY | Facility: CLINIC | Age: 24
End: 2024-11-01
Payer: COMMERCIAL

## 2024-11-01 NOTE — CONFIDENTIAL NOTE
Left VM for Lazaro asking him to call clinic to schedule onc follow up. Contact info provided.    Updated provider, Luana Van NP that several attempts have been made to reach Lazaro without success. Discussed plan to no longer reach out for scheduling but if Lazaro reaches out to clinic, we will get him scheduled.

## 2025-08-16 ENCOUNTER — HEALTH MAINTENANCE LETTER (OUTPATIENT)
Age: 25
End: 2025-08-16

## (undated) DEVICE — PREP CHLORAPREP CLEAR 3ML 260400

## (undated) DEVICE — TRAY LUMBAR PUNCTURE ADULT 4301C

## (undated) DEVICE — LINEN TOWEL PACK X5 5464

## (undated) DEVICE — NDL SPINAL 22GA 3.5" QUINCKE 405181

## (undated) DEVICE — SU ETHILON 3-0 PS-1 18" 1663H

## (undated) DEVICE — STRAP KNEE/BODY 31143004

## (undated) DEVICE — PREP DURAPREP 26ML APL 8630

## (undated) DEVICE — GLOVE PROTEXIS W/NEU-THERA 6.5  2D73TE65

## (undated) DEVICE — DILATOR VASCULAR 6FRX20CM G00937

## (undated) DEVICE — BAG SPECIMEN TRANSPORT 6X9" CH6X9CL

## (undated) DEVICE — NDL SPINAL 22GA 2.5" QUINCKE 405074

## (undated) DEVICE — DRSG BANDAID SPOT .875" PLASTIC SHEER 44120

## (undated) DEVICE — GLOVE PROTEXIS W/NEU-THERA 7.5  2D73TE75

## (undated) DEVICE — NDL BX BONE MARROW 15GA X 2.8" RAN-1528

## (undated) DEVICE — DRSG TEGADERM IV ADVANCED 2.5X2.75" 1683

## (undated) DEVICE — Device

## (undated) DEVICE — SYR 10ML FINGER CONTROL W/O NDL 309695

## (undated) DEVICE — DRSG GAUZE 3X3"

## (undated) DEVICE — NDL YUEH CENTESIS 5FRX10CM G09490 DTVN-5.0-19-10.0-YUEH

## (undated) DEVICE — GLOVE PROTEXIS W/NEU-THERA 8.0  2D73TE80

## (undated) DEVICE — SYR 50ML LL W/O NDL 309653

## (undated) DEVICE — DRSG BIOPATCH GERMICIDAL SPLIT SPONGE 7MM LG

## (undated) DEVICE — BLADE KNIFE SURG 11 371111

## (undated) DEVICE — GLOVE PROTEXIS W/NEU-THERA 5.5  2D73TE55

## (undated) DEVICE — SPECIMEN CONTAINER 5OZ STERILE 2600SA

## (undated) DEVICE — TRAY THORACENTESIS PLEURAL-SEAL KIT 8FRX12CM LATEX AK-01000

## (undated) DEVICE — PREP DURAPREP REMOVER 4OZ 8611

## (undated) DEVICE — PREP CHLORAPREP 26ML TINTED ORANGE  260815

## (undated) DEVICE — NDL 25GA 1.5" 305127

## (undated) DEVICE — GLOVE SENSICARE PI POWDER FREE 7.5 LATEX FREE MSG9075

## (undated) DEVICE — COVER TRANSDUCER PROBE 7X24" 610-575

## (undated) DEVICE — DRSG TELFA ISLAND 4X8" 7541

## (undated) DEVICE — SYR 05ML LL W/O NDL

## (undated) DEVICE — TUBING VINYL CONNECTING 14FR 30CM G02278

## (undated) DEVICE — GLOVE PROTEXIS W/NEU-THERA 7.0  2D73TE70

## (undated) DEVICE — PEN MARKING SKIN W/LABELS 31145918

## (undated) DEVICE — NDL SPINAL WHITACRE 22GA 3.5" 405010

## (undated) DEVICE — BAG CHEMOTHERAPY DRUGS 12X15"

## (undated) DEVICE — PAD CHUX UNDERPAD 30X36" P3036C

## (undated) DEVICE — SOL NACL 0.9% INJ 250ML BAG 2B1322Q

## (undated) DEVICE — DRSG GAUZE 2X2" 8042

## (undated) DEVICE — DECANTER TRANSFER DEVICE 2008S

## (undated) DEVICE — DILATOR VASCULAR 8FRX20CM G00980

## (undated) DEVICE — SYR 30ML LL W/O NDL 302832

## (undated) DEVICE — NDL BX BONE MARROW SNARECOIL 11GA X 4" RBN-114

## (undated) DEVICE — NDL SPINAL 22GA 1.5"

## (undated) DEVICE — GOWN XLG DISP 9545

## (undated) DEVICE — DRSG GAUZE 4X8" NON21842

## (undated) DEVICE — SOL ISOPROPYL RUBBING ALCOHOL USP 70% 4OZ HDX-20 I0020

## (undated) DEVICE — SYR 10ML LL W/O NDL 302995

## (undated) DEVICE — COVER EASY EQUIP BAG W/BAND LATEX FREE EZ-28

## (undated) DEVICE — NDL 25GA 5/8" 305122

## (undated) DEVICE — PREP POVIDONE IODINE SOLUTION 10% 120ML

## (undated) DEVICE — DECANTER BAG 2002S

## (undated) DEVICE — NDL 21GA 1.5"

## (undated) RX ORDER — GLYCOPYRROLATE 0.2 MG/ML
INJECTION INTRAMUSCULAR; INTRAVENOUS
Status: DISPENSED
Start: 2019-10-24

## (undated) RX ORDER — FENTANYL CITRATE 50 UG/ML
INJECTION, SOLUTION INTRAMUSCULAR; INTRAVENOUS
Status: DISPENSED
Start: 2020-03-19

## (undated) RX ORDER — GLYCOPYRROLATE 0.2 MG/ML
INJECTION INTRAMUSCULAR; INTRAVENOUS
Status: DISPENSED
Start: 2020-10-06

## (undated) RX ORDER — ONDANSETRON 2 MG/ML
INJECTION INTRAMUSCULAR; INTRAVENOUS
Status: DISPENSED
Start: 2019-10-10

## (undated) RX ORDER — FENTANYL CITRATE 50 UG/ML
INJECTION, SOLUTION INTRAMUSCULAR; INTRAVENOUS
Status: DISPENSED
Start: 2019-10-31

## (undated) RX ORDER — HEPARIN SODIUM (PORCINE) LOCK FLUSH IV SOLN 100 UNIT/ML 100 UNIT/ML
SOLUTION INTRAVENOUS
Status: DISPENSED
Start: 2019-10-24

## (undated) RX ORDER — PROPOFOL 10 MG/ML
INJECTION, EMULSION INTRAVENOUS
Status: DISPENSED
Start: 2020-05-14

## (undated) RX ORDER — LIDOCAINE HYDROCHLORIDE 20 MG/ML
INJECTION, SOLUTION EPIDURAL; INFILTRATION; INTRACAUDAL; PERINEURAL
Status: DISPENSED
Start: 2020-10-06

## (undated) RX ORDER — FENTANYL CITRATE 50 UG/ML
INJECTION, SOLUTION INTRAMUSCULAR; INTRAVENOUS
Status: DISPENSED
Start: 2021-06-15

## (undated) RX ORDER — ONDANSETRON 2 MG/ML
INJECTION INTRAMUSCULAR; INTRAVENOUS
Status: DISPENSED
Start: 2019-12-23

## (undated) RX ORDER — CEFAZOLIN SODIUM 1 G/3ML
INJECTION, POWDER, FOR SOLUTION INTRAMUSCULAR; INTRAVENOUS
Status: DISPENSED
Start: 2019-10-24

## (undated) RX ORDER — KETAMINE HCL IN 0.9 % NACL 50 MG/5 ML
SYRINGE (ML) INTRAVENOUS
Status: DISPENSED
Start: 2019-08-31

## (undated) RX ORDER — HEPARIN SODIUM (PORCINE) LOCK FLUSH IV SOLN 100 UNIT/ML 100 UNIT/ML
SOLUTION INTRAVENOUS
Status: DISPENSED
Start: 2019-11-12

## (undated) RX ORDER — ONDANSETRON 2 MG/ML
INJECTION INTRAMUSCULAR; INTRAVENOUS
Status: DISPENSED
Start: 2021-06-15

## (undated) RX ORDER — FENTANYL CITRATE 50 UG/ML
INJECTION, SOLUTION INTRAMUSCULAR; INTRAVENOUS
Status: DISPENSED
Start: 2021-02-23

## (undated) RX ORDER — FENTANYL CITRATE 50 UG/ML
INJECTION, SOLUTION INTRAMUSCULAR; INTRAVENOUS
Status: DISPENSED
Start: 2021-11-30

## (undated) RX ORDER — ONDANSETRON 2 MG/ML
INJECTION INTRAMUSCULAR; INTRAVENOUS
Status: DISPENSED
Start: 2020-11-03

## (undated) RX ORDER — KETAMINE HCL IN 0.9 % NACL 50 MG/5 ML
SYRINGE (ML) INTRAVENOUS
Status: DISPENSED
Start: 2019-09-09

## (undated) RX ORDER — HEPARIN SODIUM,PORCINE 10 UNIT/ML
VIAL (ML) INTRAVENOUS
Status: DISPENSED
Start: 2019-12-19

## (undated) RX ORDER — HEPARIN SODIUM (PORCINE) LOCK FLUSH IV SOLN 100 UNIT/ML 100 UNIT/ML
SOLUTION INTRAVENOUS
Status: DISPENSED
Start: 2019-11-25

## (undated) RX ORDER — PROPOFOL 10 MG/ML
INJECTION, EMULSION INTRAVENOUS
Status: DISPENSED
Start: 2021-11-30

## (undated) RX ORDER — GLYCOPYRROLATE 0.2 MG/ML
INJECTION INTRAMUSCULAR; INTRAVENOUS
Status: DISPENSED
Start: 2021-11-30

## (undated) RX ORDER — EPHEDRINE SULFATE 50 MG/ML
INJECTION, SOLUTION INTRAMUSCULAR; INTRAVENOUS; SUBCUTANEOUS
Status: DISPENSED
Start: 2020-03-19

## (undated) RX ORDER — GLYCOPYRROLATE 0.2 MG/ML
INJECTION, SOLUTION INTRAMUSCULAR; INTRAVENOUS
Status: DISPENSED
Start: 2019-10-24

## (undated) RX ORDER — PROPOFOL 10 MG/ML
INJECTION, EMULSION INTRAVENOUS
Status: DISPENSED
Start: 2020-11-03

## (undated) RX ORDER — HEPARIN SODIUM,PORCINE 10 UNIT/ML
VIAL (ML) INTRAVENOUS
Status: DISPENSED
Start: 2019-08-31

## (undated) RX ORDER — FENTANYL CITRATE 50 UG/ML
INJECTION, SOLUTION INTRAMUSCULAR; INTRAVENOUS
Status: DISPENSED
Start: 2021-09-07

## (undated) RX ORDER — LIDOCAINE HYDROCHLORIDE 20 MG/ML
INJECTION, SOLUTION EPIDURAL; INFILTRATION; INTRACAUDAL; PERINEURAL
Status: DISPENSED
Start: 2021-11-30

## (undated) RX ORDER — GLYCOPYRROLATE 0.2 MG/ML
INJECTION INTRAMUSCULAR; INTRAVENOUS
Status: DISPENSED
Start: 2020-03-19

## (undated) RX ORDER — ONDANSETRON 2 MG/ML
INJECTION INTRAMUSCULAR; INTRAVENOUS
Status: DISPENSED
Start: 2021-11-30

## (undated) RX ORDER — ONDANSETRON 2 MG/ML
INJECTION INTRAMUSCULAR; INTRAVENOUS
Status: DISPENSED
Start: 2020-03-19

## (undated) RX ORDER — FENTANYL CITRATE 50 UG/ML
INJECTION, SOLUTION INTRAMUSCULAR; INTRAVENOUS
Status: DISPENSED
Start: 2020-10-06

## (undated) RX ORDER — LIDOCAINE 40 MG/G
CREAM TOPICAL
Status: DISPENSED
Start: 2020-03-26

## (undated) RX ORDER — FENTANYL CITRATE 50 UG/ML
INJECTION, SOLUTION INTRAMUSCULAR; INTRAVENOUS
Status: DISPENSED
Start: 2019-09-09

## (undated) RX ORDER — HEPARIN SODIUM,PORCINE 10 UNIT/ML
VIAL (ML) INTRAVENOUS
Status: DISPENSED
Start: 2019-10-24

## (undated) RX ORDER — FENTANYL CITRATE 50 UG/ML
INJECTION, SOLUTION INTRAMUSCULAR; INTRAVENOUS
Status: DISPENSED
Start: 2019-10-10

## (undated) RX ORDER — LIDOCAINE 40 MG/G
CREAM TOPICAL
Status: DISPENSED
Start: 2020-04-23

## (undated) RX ORDER — HEPARIN SODIUM,PORCINE 10 UNIT/ML
VIAL (ML) INTRAVENOUS
Status: DISPENSED
Start: 2020-04-23

## (undated) RX ORDER — ONDANSETRON 2 MG/ML
INJECTION INTRAMUSCULAR; INTRAVENOUS
Status: DISPENSED
Start: 2020-10-06

## (undated) RX ORDER — PROPOFOL 10 MG/ML
INJECTION, EMULSION INTRAVENOUS
Status: DISPENSED
Start: 2020-10-06

## (undated) RX ORDER — ONDANSETRON 2 MG/ML
INJECTION INTRAMUSCULAR; INTRAVENOUS
Status: DISPENSED
Start: 2020-05-14

## (undated) RX ORDER — ONDANSETRON 2 MG/ML
INJECTION INTRAMUSCULAR; INTRAVENOUS
Status: DISPENSED
Start: 2019-10-24

## (undated) RX ORDER — FENTANYL CITRATE 50 UG/ML
INJECTION, SOLUTION INTRAMUSCULAR; INTRAVENOUS
Status: DISPENSED
Start: 2020-05-14

## (undated) RX ORDER — KETAMINE HCL IN 0.9 % NACL 50 MG/5 ML
SYRINGE (ML) INTRAVENOUS
Status: DISPENSED
Start: 2019-09-01

## (undated) RX ORDER — LIDOCAINE HYDROCHLORIDE 20 MG/ML
INJECTION, SOLUTION EPIDURAL; INFILTRATION; INTRACAUDAL; PERINEURAL
Status: DISPENSED
Start: 2019-10-24

## (undated) RX ORDER — PROPOFOL 10 MG/ML
INJECTION, EMULSION INTRAVENOUS
Status: DISPENSED
Start: 2019-10-24

## (undated) RX ORDER — LIDOCAINE HYDROCHLORIDE 20 MG/ML
INJECTION, SOLUTION EPIDURAL; INFILTRATION; INTRACAUDAL; PERINEURAL
Status: DISPENSED
Start: 2020-03-19

## (undated) RX ORDER — FENTANYL CITRATE 50 UG/ML
INJECTION, SOLUTION INTRAMUSCULAR; INTRAVENOUS
Status: DISPENSED
Start: 2020-02-20

## (undated) RX ORDER — EPHEDRINE SULFATE 50 MG/ML
INJECTION, SOLUTION INTRAMUSCULAR; INTRAVENOUS; SUBCUTANEOUS
Status: DISPENSED
Start: 2021-11-30

## (undated) RX ORDER — LIDOCAINE HYDROCHLORIDE 10 MG/ML
INJECTION, SOLUTION EPIDURAL; INFILTRATION; INTRACAUDAL; PERINEURAL
Status: DISPENSED
Start: 2021-11-30

## (undated) RX ORDER — LIDOCAINE HYDROCHLORIDE 10 MG/ML
INJECTION, SOLUTION EPIDURAL; INFILTRATION; INTRACAUDAL; PERINEURAL
Status: DISPENSED
Start: 2019-08-31

## (undated) RX ORDER — GLYCOPYRROLATE 0.2 MG/ML
INJECTION INTRAMUSCULAR; INTRAVENOUS
Status: DISPENSED
Start: 2020-05-14

## (undated) RX ORDER — LIDOCAINE HYDROCHLORIDE 10 MG/ML
INJECTION, SOLUTION EPIDURAL; INFILTRATION; INTRACAUDAL; PERINEURAL
Status: DISPENSED
Start: 2019-10-24

## (undated) RX ORDER — PROPOFOL 10 MG/ML
INJECTION, EMULSION INTRAVENOUS
Status: DISPENSED
Start: 2019-10-31

## (undated) RX ORDER — ONDANSETRON 2 MG/ML
INJECTION INTRAMUSCULAR; INTRAVENOUS
Status: DISPENSED
Start: 2021-02-23

## (undated) RX ORDER — HEPARIN SODIUM (PORCINE) LOCK FLUSH IV SOLN 100 UNIT/ML 100 UNIT/ML
SOLUTION INTRAVENOUS
Status: DISPENSED
Start: 2020-04-16

## (undated) RX ORDER — PROPOFOL 10 MG/ML
INJECTION, EMULSION INTRAVENOUS
Status: DISPENSED
Start: 2020-03-19

## (undated) RX ORDER — FENTANYL CITRATE 50 UG/ML
INJECTION, SOLUTION INTRAMUSCULAR; INTRAVENOUS
Status: DISPENSED
Start: 2019-10-24

## (undated) RX ORDER — LIDOCAINE HYDROCHLORIDE 20 MG/ML
INJECTION, SOLUTION EPIDURAL; INFILTRATION; INTRACAUDAL; PERINEURAL
Status: DISPENSED
Start: 2019-12-23

## (undated) RX ORDER — HEPARIN SODIUM,PORCINE 10 UNIT/ML
VIAL (ML) INTRAVENOUS
Status: DISPENSED
Start: 2020-03-26

## (undated) RX ORDER — FENTANYL CITRATE-0.9 % NACL/PF 10 MCG/ML
PLASTIC BAG, INJECTION (ML) INTRAVENOUS
Status: DISPENSED
Start: 2021-11-30

## (undated) RX ORDER — LIDOCAINE HYDROCHLORIDE 20 MG/ML
INJECTION, SOLUTION EPIDURAL; INFILTRATION; INTRACAUDAL; PERINEURAL
Status: DISPENSED
Start: 2021-06-15

## (undated) RX ORDER — HEPARIN SODIUM (PORCINE) LOCK FLUSH IV SOLN 100 UNIT/ML 100 UNIT/ML
SOLUTION INTRAVENOUS
Status: DISPENSED
Start: 2019-08-31